# Patient Record
Sex: FEMALE | Race: BLACK OR AFRICAN AMERICAN | NOT HISPANIC OR LATINO | Employment: OTHER | URBAN - METROPOLITAN AREA
[De-identification: names, ages, dates, MRNs, and addresses within clinical notes are randomized per-mention and may not be internally consistent; named-entity substitution may affect disease eponyms.]

---

## 2017-01-04 ENCOUNTER — OFFICE VISIT (OUTPATIENT)
Dept: OPTOMETRY | Facility: CLINIC | Age: 27
End: 2017-01-04
Payer: COMMERCIAL

## 2017-01-04 ENCOUNTER — APPOINTMENT (OUTPATIENT)
Dept: OPTOMETRY | Facility: CLINIC | Age: 27
End: 2017-01-04
Payer: COMMERCIAL

## 2017-01-04 DIAGNOSIS — H52.13 MYOPIA, BILATERAL: Primary | ICD-10-CM

## 2017-01-04 PROCEDURE — 92340 FIT SPECTACLES MONOFOCAL: CPT | Performed by: OPTOMETRIST

## 2017-01-04 PROCEDURE — 92004 COMPRE OPH EXAM NEW PT 1/>: CPT | Performed by: OPTOMETRIST

## 2017-01-04 PROCEDURE — 92015 DETERMINE REFRACTIVE STATE: CPT | Performed by: OPTOMETRIST

## 2017-01-04 ASSESSMENT — REFRACTION_MANIFEST
OD_SPHERE: -0.50
OS_CYLINDER: SPHERE
OS_SPHERE: -0.50
OD_CYLINDER: SPHERE

## 2017-01-04 ASSESSMENT — CUP TO DISC RATIO
OS_RATIO: 0.3
OD_RATIO: 0.2

## 2017-01-04 ASSESSMENT — CONF VISUAL FIELD
OS_NORMAL: 1
OD_NORMAL: 1
METHOD: COUNTING FINGERS

## 2017-01-04 ASSESSMENT — VISUAL ACUITY
METHOD: SNELLEN - LINEAR
OD_SC+: -1
OD_SC: 20/20
OS_SC: 20/20
OS_SC: 20/30
OS_SC+: -1
OD_SC: 20/20-1

## 2017-01-04 ASSESSMENT — SLIT LAMP EXAM - LIDS
COMMENTS: NORMAL
COMMENTS: NORMAL

## 2017-01-04 ASSESSMENT — TONOMETRY
IOP_METHOD: TONOPEN
OS_IOP_MMHG: 10
OD_IOP_MMHG: 14

## 2017-01-04 ASSESSMENT — EXTERNAL EXAM - LEFT EYE: OS_EXAM: NORMAL

## 2017-01-04 ASSESSMENT — EXTERNAL EXAM - RIGHT EYE: OD_EXAM: NORMAL

## 2017-01-04 NOTE — MR AVS SNAPSHOT
After Visit Summary   1/4/2017    Hilaria Bonner    MRN: [id number]           Patient Information     Date Of Birth          1990        Visit Information        Provider Department      1/4/2017 2:30 PM Marquez Pang, BROOK [facility]        Today's Diagnoses     Myopia, bilateral    -  1       Care Instructions    Recommend new glasses.  Glasses for distance vision only.    Return in 1 year for a complete eye exam or sooner if needed.    Marquez Pang, BROOK    The affects of the dilating drops last for 4- 6 hours.  You will be more sensitive to light and vision will be blurry up close.  Mydriatic sunglasses were given if needed.            Follow-ups after your visit        Follow-up notes from your care team     Return in about 1 year (around 1/4/2018) for Annual Visit.      Your next 10 appointments already scheduled     Jan 25, 2017  2:00 PM   Return Visit with Reno Lee DPM   [facility]    [address] [place] [id number] [phone]              Who to contact     If you have questions or need follow up information about today's clinic visit or your schedule please contact [facility] directly at [phone].  Normal or non-critical lab and imaging results will be communicated to you by MyChart, letter or phone within 4 business days after the clinic has received the results. If you do not hear from us within 7 days, please contact the clinic through MyChart or phone. If you have a critical or abnormal lab result, we will notify you by phone as soon as possible.  Submit refill requests through [employer] or call your pharmacy and they will forward the refill request to us. Please allow 3 business days for your refill to be completed.          Additional Information About Your Visit        [employer] Information     Att[employer]io is an electronic gateway that provides easy, online access to your  medical records. With [employer], you can request a clinic appointment, read your test results, renew a prescription or communicate with your care team.     To sign up for [employer] visit the website at [email or web address]   You will be asked to enter the access code listed below, as well as some personal information. Please follow the directions to create your username and password.     Your access code is: [email or web address]  Expires: 3/6/2017 11:25 AM     Your access code will  in 90 days. If you need help or a new code, please contact your [facility] or call [phone] for assistance.        Care EveryWhere ID     This is your Care EveryWhere ID. This could be used by other organizations to access your [place] medical records  [id number]        Your Vitals Were     Last Period                   2016            Blood Pressure from Last 3 Encounters:   16 126/80   16 126/100    Weight from Last 3 Encounters:   16 146.104 kg (322 lb 1.6 oz)   16 146.104 kg (322 lb 1.6 oz)              We Performed the Following     EYE EXAM (SIMPLE-NONBILLABLE)     REFRACTION        Primary Care Provider Office Phone #    [facility] [phone]       No address on file        Thank you!     Thank you for choosing [facility]  for your care. Our goal is always to provide you with excellent care. Hearing back from our patients is one way we can continue to improve our services. Please take a few minutes to complete the written survey that you may receive in the mail after your visit with us. Thank you!             Your Updated Medication List - Protect others around you: Learn how to safely use, store and throw away your medicines at www.disposemymeds.org.          This list is accurate as of: 17  3:19 PM.  Always use your most recent med list.                   Brand Name Dispense Instructions for use    medroxyPROGESTERone 150  MG/ML injection    DEPO-PROVERA    1 mL    Inject 1 mL (150 mg) into the muscle every 3 months       traMADol 50 MG tablet    ULTRAM     Take by mouth every 6 hours as needed for moderate pain

## 2017-01-04 NOTE — PATIENT INSTRUCTIONS
Recommend new glasses.  Glasses for distance vision only.    Return in 1 year for a complete eye exam or sooner if needed.    Marquez Pang, OD    The affects of the dilating drops last for 4- 6 hours.  You will be more sensitive to light and vision will be blurry up close.  Mydriatic sunglasses were given if needed.

## 2017-01-04 NOTE — PROGRESS NOTES
Chief Complaint   Patient presents with     COMPREHENSIVE EYE EXAM      Accompanied by son  Last Eye Exam: 15 years  Dilated Previously: patient unsure, side effects of dilation explained today,info given to patient     What are you currently using to see?  does not use glasses or contacts       Distance Vision Acuity: Noticed gradual change in both eyes    Near Vision Acuity: Satisfied with vision while reading  unaided    Eye Comfort: good  Do you use eye drops? : No  Occupation or Hobbies: [employer]         Medical, surgical and family histories reviewed and updated 1/4/2017.       OBJECTIVE: See Ophthalmology exam    ASSESSMENT:    ICD-10-CM    1. Myopia, bilateral H52.13 EYE EXAM (SIMPLE-NONBILLABLE)     REFRACTION      PLAN:     Patient Instructions   Recommend new glasses.  Glasses for distance vision only.    Return in 1 year for a complete eye exam or sooner if needed.    Marquez Pang, OD

## 2017-03-08 ENCOUNTER — ALLIED HEALTH/NURSE VISIT (OUTPATIENT)
Dept: NURSING | Facility: CLINIC | Age: 27
End: 2017-03-08
Payer: COMMERCIAL

## 2017-03-08 VITALS — DIASTOLIC BLOOD PRESSURE: 86 MMHG | SYSTOLIC BLOOD PRESSURE: 118 MMHG

## 2017-03-08 PROCEDURE — 96372 THER/PROPH/DIAG INJ SC/IM: CPT

## 2017-03-08 PROCEDURE — 99207 ZZC NO CHARGE NURSE ONLY: CPT

## 2017-03-08 NOTE — MR AVS SNAPSHOT
"              After Visit Summary   3/8/2017    Hilaria Bonner    MRN: [id number]           Patient Information     Date Of Birth          1990        Visit Information        Provider Department      3/8/2017 3:00 PM BA [facility]LL[facility]        Today's Diagnoses     Contraception    -  1       Follow-ups after your visit        Who to contact     If you have questions or need follow up information about today's clinic visit or your schedule please contact [facility] directly at [phone].  Normal or non-critical lab and imaging results will be communicated to you by MyChart, letter or phone within 4 business days after the clinic has received the results. If you do not hear from us within 7 days, please contact the clinic through [employer] or phone. If you have a critical or abnormal lab result, we will notify you by phone as soon as possible.  Submit refill requests through Re[employer] or call your pharmacy and they will forward the refill request to us. Please allow 3 business days for your refill to be completed.          Additional Information About Your Visit        MyChart Information     ReSnap lets you send messages to your doctor, view your test results, renew your prescriptions, schedule appointments and more. To sign up, go to [email or web address] . Click on \"Log in\" on the left side of the screen, which will take you to the Welcome page. Then click on \"Sign up Now\" on the right side of the page.     You will be asked to enter the access code listed below, as well as some personal information. Please follow the directions to create your username and password.     Your access code is: [email or web address]  Expires: 2017  3:55 PM     Your access code will  in 90 days. If you need help or a new code, please call your [facility] or [phone].        Care EveryWhere ID     This is your Care EveryWhere ID. This could be used by other organizations to access " your [place] medical records  [id number]         Blood Pressure from Last 3 Encounters:   03/08/17 118/86   12/14/16 126/80   12/06/16 (!) 126/100    Weight from Last 3 Encounters:   12/14/16 (!) 146.1 kg (322 lb 1.6 oz)   12/06/16 (!) 146.1 kg (322 lb 1.6 oz)              We Performed the Following     INJECTION INTRAMUSCULAR OR SUB-Q     Medroxyprogesterone inj  1mg   (Depo Provera J-Code)        Primary Care Provider Office Phone #    [facility] [phone]       No address on file        Thank you!     Thank you for choosing [facility]  for your care. Our goal is always to provide you with excellent care. Hearing back from our patients is one way we can continue to improve our services. Please take a few minutes to complete the written survey that you may receive in the mail after your visit with us. Thank you!             Your Updated Medication List - Protect others around you: Learn how to safely use, store and throw away your medicines at www.disposemymeds.org.          This list is accurate as of: 3/8/17  3:55 PM.  Always use your most recent med list.                   Brand Name Dispense Instructions for use    medroxyPROGESTERone 150 MG/ML injection    DEPO-PROVERA    1 mL    Inject 1 mL (150 mg) into the muscle every 3 months       traMADol 50 MG tablet    ULTRAM     Take by mouth every 6 hours as needed for moderate pain

## 2017-03-08 NOTE — NURSING NOTE
The following medication was given:     MEDICATION: Depo Provera 150mg  ROUTE: IM  SITE: [facility]  DOSE: 150 mg  LOT #: [id number]  :  Krystyna HERNÁNDEZ   EXPIRATION DATE:  02/2019  NDC#: [address]-9  Blood Pressure: 118/86   Date of last Pap:  December of 2016  Radha Greene CMA  March 8, 2017 3:36 PM

## 2017-05-24 ENCOUNTER — ALLIED HEALTH/NURSE VISIT (OUTPATIENT)
Dept: NURSING | Facility: CLINIC | Age: 27
End: 2017-05-24
Payer: COMMERCIAL

## 2017-05-24 PROCEDURE — 96372 THER/PROPH/DIAG INJ SC/IM: CPT

## 2017-05-24 PROCEDURE — 99207 ZZC NO CHARGE NURSE ONLY: CPT

## 2017-05-24 NOTE — NURSING NOTE
The following medication was given:     MEDICATION: Depo Provera 150mg  ROUTE: IM  SITE: [facility]  DOSE: 1 ML  LOT #: [id number]  :  [employer]   EXPIRATION DATE:  12/01/2019  NDC#: [address]-2  Due:  Aug 9- Aug 23 2017    DARRYN Barreto MA

## 2017-05-24 NOTE — MR AVS SNAPSHOT
After Visit Summary   5/24/2017    Hilaria Bonner    MRN: [id number]           Patient Information     Date Of Birth          1990        Visit Information        Provider Department      5/24/2017 11:40 AM BA ANCILLARY [facility]        Today's Diagnoses     Contraception    -  1       Follow-ups after your visit        Who to contact     If you have questions or need follow up information about today's clinic visit or your schedule please contact [facility] directly at [phone].  Normal or non-critical lab and imaging results will be communicated to you by MyChart, letter or phone within 4 business days after the clinic has received the results. If you do not hear from us within 7 days, please contact the clinic through [employer] or phone. If you have a critical or abnormal lab result, we will notify you by phone as soon as possible.  Submit refill requests through [employer] or call your pharmacy and they will forward the refill request to us. Please allow 3 business days for your refill to be completed.          Additional Information About Your Visit        MyChart Information     [employer] gives you secure access to your electronic health record. If you see a primary care provider, you can also send messages to your care team and make appointments. If you have questions, please call your primary care clinic.  If you do not have a primary care provider, please call [phone] and they will assist you.        Care EveryWhere ID     This is your Care EveryWhere ID. This could be used by other organizations to access your [place] medical records  [id number]         Blood Pressure from Last 3 Encounters:   03/08/17 118/86   12/14/16 126/80   12/06/16 (!) 126/100    Weight from Last 3 Encounters:   12/14/16 (!) 146.1 kg (322 lb 1.6 oz)   12/06/16 (!) 146.1 kg (322 lb 1.6 oz)              Today, you had the following     No orders found for display       Primary  Care Provider [facility] Phone [facility] [phone]       No address on file        Thank you!     Thank you for choosing [facility]  for your care. Our goal is always to provide you with excellent care. Hearing back from our patients is one way we can continue to improve our services. Please take a few minutes to complete the written survey that you may receive in the mail after your visit with us. Thank you!             Your Updated Medication List - Protect others around you: Learn how to safely use, store and throw away your medicines at www.disposemymeds.org.          This list is accurate as of: 5/24/17 12:58 PM.  Always use your most recent med list.                   Brand Name Dispense Instructions for use    medroxyPROGESTERone 150 MG/ML injection    DEPO-PROVERA    1 mL    Inject 1 mL (150 mg) into the muscle every 3 months       traMADol 50 MG tablet    ULTRAM     Take by mouth every 6 hours as needed for moderate pain

## 2018-01-04 ENCOUNTER — TRANSFERRED RECORDS (OUTPATIENT)
Dept: HEALTH INFORMATION MANAGEMENT | Facility: CLINIC | Age: 28
End: 2018-01-04

## 2018-01-12 ENCOUNTER — OFFICE VISIT (OUTPATIENT)
Dept: FAMILY MEDICINE | Facility: CLINIC | Age: 28
End: 2018-01-12
Payer: COMMERCIAL

## 2018-01-12 VITALS
WEIGHT: 293 LBS | HEART RATE: 89 BPM | BODY MASS INDEX: 45.99 KG/M2 | OXYGEN SATURATION: 100 % | DIASTOLIC BLOOD PRESSURE: 86 MMHG | TEMPERATURE: 98.2 F | HEIGHT: 67 IN | SYSTOLIC BLOOD PRESSURE: 122 MMHG

## 2018-01-12 DIAGNOSIS — Z00.00 ROUTINE HISTORY AND PHYSICAL EXAMINATION OF ADULT: Primary | ICD-10-CM

## 2018-01-12 DIAGNOSIS — N92.6 IRREGULAR MENSTRUAL CYCLE: ICD-10-CM

## 2018-01-12 DIAGNOSIS — E66.01 MORBID OBESITY (H): ICD-10-CM

## 2018-01-12 LAB
BETA HCG QUAL IFA URINE: NEGATIVE
CHOLEST SERPL-MCNC: 198 MG/DL
GLUCOSE SERPL-MCNC: 130 MG/DL (ref 70–99)
HBA1C MFR BLD: 5.3 % (ref 4.3–6)
HDLC SERPL-MCNC: 71 MG/DL
HGB BLD-MCNC: 14.8 G/DL (ref 11.7–15.7)
LDLC SERPL CALC-MCNC: 90 MG/DL
NONHDLC SERPL-MCNC: 127 MG/DL
TRIGL SERPL-MCNC: 183 MG/DL
TSH SERPL DL<=0.005 MIU/L-ACNC: 2.39 MU/L (ref 0.4–4)

## 2018-01-12 PROCEDURE — 84443 ASSAY THYROID STIM HORMONE: CPT | Performed by: PHYSICIAN ASSISTANT

## 2018-01-12 PROCEDURE — 84703 CHORIONIC GONADOTROPIN ASSAY: CPT | Performed by: PHYSICIAN ASSISTANT

## 2018-01-12 PROCEDURE — 80061 LIPID PANEL: CPT | Performed by: PHYSICIAN ASSISTANT

## 2018-01-12 PROCEDURE — 85018 HEMOGLOBIN: CPT | Performed by: PHYSICIAN ASSISTANT

## 2018-01-12 PROCEDURE — 83036 HEMOGLOBIN GLYCOSYLATED A1C: CPT | Performed by: PHYSICIAN ASSISTANT

## 2018-01-12 PROCEDURE — 82947 ASSAY GLUCOSE BLOOD QUANT: CPT | Performed by: PHYSICIAN ASSISTANT

## 2018-01-12 PROCEDURE — 99395 PREV VISIT EST AGE 18-39: CPT | Performed by: PHYSICIAN ASSISTANT

## 2018-01-12 PROCEDURE — 36415 COLL VENOUS BLD VENIPUNCTURE: CPT | Performed by: PHYSICIAN ASSISTANT

## 2018-01-12 RX ORDER — NORETHINDRONE ACETATE AND ETHINYL ESTRADIOL .02; 1 MG/1; MG/1
1 TABLET ORAL DAILY
Qty: 63 TABLET | Refills: 4 | Status: SHIPPED | OUTPATIENT
Start: 2018-01-12 | End: 2019-03-07

## 2018-01-12 NOTE — Clinical Note
Please abstract the following data from this visit with this patient into the appropriate field in Epic: Eye exam with ophthalmology on this date: 1/4/18

## 2018-01-12 NOTE — PROGRESS NOTES
SUBJECTIVE:   CC: Hilaria Bonner is an 27 year old woman who presents for preventive health visit.     Healthy Habits:    Do you get at least three servings of calcium containing foods daily (dairy, green leafy vegetables, etc.)? yes    Amount of exercise or daily activities, outside of work: 7 day(s) per week- 30 minutes walking    Problems taking medications regularly No    Medication side effects: No    Have you had an eye exam in the past two years? yes    Do you see a dentist twice per year? yes    Do you have sleep apnea, excessive snoring or daytime drowsiness?no  Eye exam with ophthalmology on this date: 1/4/18    Concern:   1. Irregular periods. Patient has stopped Depo 1 year ago, but her periods have not stabilized yet. She may get period 1-2 times a month and its heavy.     2. Patient is gaining weight. Patient would like professional help to manage her weight.     Today's PHQ-2 Score:   PHQ-2 ( 1999 Pfizer) 1/12/2018 12/6/2016   Q1: Little interest or pleasure in doing things 0 0   Q2: Feeling down, depressed or hopeless 0 0   PHQ-2 Score 0 0     Abuse: Current or Past(Physical, Sexual or Emotional)- No  Do you feel safe in your environment - Yes  Social History   Substance Use Topics     Smoking status: Former Smoker     Start date: 11/20/2016     Smokeless tobacco: Never Used     Alcohol use 0.0 oz/week     0 Standard drinks or equivalent per week      Comment: occasional     If you drink alcohol do you typically have >3 drinks per day or >7 drinks per week? No                     Reviewed orders with patient.  Reviewed health maintenance and updated orders accordingly - Yes  Patient Active Problem List   Diagnosis     Morbid obesity (H)     Past Surgical History:   Procedure Laterality Date     KNEE SURGERY  most recently - [id number]    3 surgeries in [place]       Social History   Substance Use Topics     Smoking status: Former Smoker     Start date: 11/20/2016     Smokeless tobacco: Never Used      "Alcohol use 0.0 oz/week     0 Standard drinks or equivalent per week      Comment: occasional     Family History   Problem Relation Age of Onset     DIABETES Father      Hypertension Father      Breast Cancer Sister 26     surgery only     CANCER Sister      Coronary Artery Disease Other      paternal aunt     Thyroid Disease Other      neice     CEREBROVASCULAR DISEASE No family hx of          Current Outpatient Prescriptions   Medication Sig Dispense Refill     Multiple Vitamins-Minerals (WOMENS MULTI VITAMIN & MINERAL PO)        norethindrone-ethinyl estradiol (MICROGESTIN 1/20) 1-20 MG-MCG per tablet Take 1 tablet by mouth daily 63 tablet 4     No Known Allergies      Mammogram not appropriate for this patient based on age.    Pertinent mammograms are reviewed under the imaging tab.  History of abnormal Pap smear:   NO - age 30- 65 PAP every 3 years recommended  Last 3 Pap Results:   PAP (no units)   Date Value   12/06/2016 NIL          ROS:  CONSTITUTIONAL:weight gain  I: NEGATIVE for worrisome rashes, moles or lesions  E: NEGATIVE for vision changes or irritation  ENT: NEGATIVE for ear, mouth and throat problems  R: NEGATIVE for significant cough or SOB  B: NEGATIVE for masses, tenderness or discharge  CV: NEGATIVE for chest pain, palpitations or peripheral edema  GI: NEGATIVE for nausea, abdominal pain, heartburn, or change in bowel habits   female: no unusual urinary symptoms, no unusual vaginal symptoms and menses: irregular-frequent  and menorrhagia   M: NEGATIVE for significant arthralgias or myalgia  N: NEGATIVE for weakness, dizziness or paresthesias  P: NEGATIVE for changes in mood or affect    OBJECTIVE:   /86 (BP Location: Right arm, Patient Position: Chair, Cuff Size: Adult Large)  Pulse 89  Temp 98.2  F (36.8  C) (Oral)  Ht 5' 6.61\" (1.692 m)  Wt (!) 317 lb 4.8 oz (143.9 kg)  LMP 12/22/2017 (Exact Date)  SpO2 100%  BMI 50.27 kg/m2  EXAM:  GENERAL: healthy, alert and no " distress  EYES: Eyes grossly normal to inspection, PERRL and conjunctivae and sclerae normal  HENT: ear canals and TM's normal, nose and mouth without ulcers or lesions  NECK: no adenopathy, no asymmetry, masses, or scars and thyroid normal to palpation  RESP: lungs clear to auscultation - no rales, rhonchi or wheezes  BREAST: normal without masses, tenderness or nipple discharge and no palpable axillary masses or adenopathy  CV: regular rate and rhythm, normal S1 S2, no S3 or S4, no murmur, click or rub, no peripheral edema and peripheral pulses strong  ABDOMEN: soft, nontender, no hepatosplenomegaly, no masses and bowel sounds normal  MS: no gross musculoskeletal defects noted, no edema  SKIN: no suspicious lesions or rashes  NEURO: Normal strength and tone, mentation intact and speech normal  PSYCH: mentation appears normal, affect normal/bright    ASSESSMENT/PLAN:       ICD-10-CM    1. Routine history and physical examination of adult Z00.00    2. Irregular menstrual cycle N92.6 Beta HCG qual IFA urine - FMG and Neptune     Hemoglobin     norethindrone-ethinyl estradiol (MICROGESTIN 1/20) 1-20 MG-MCG per tablet   3. Morbid obesity (H) E66.01 BARIATRIC ADULT REFERRAL     Lipid Profile (Chol, Trig, HDL, LDL calc)     Hemoglobin A1c     Glucose     TSH with free T4 reflex   patient will start Loestrin 1 tab daily to help regulate period and flow.   Discussed risks associated with starting oral contraception , obesity and smoking.   Patient was strongly advised to stop smoking  Patient was advised to move around more especially on long trips to prevent blood clots.  Signs and symptom of DVT and PE were discussed.  Patient was also placed on low dose combination pill.   Patient reported understanding of risks and will stop occasional tobacco use.   3. Patient will make appointment with bariatric clinic and start working on weight loss.     COUNSELING:   Reviewed preventive health counseling, as reflected in  "patient instructions       Regular exercise       Healthy diet/nutrition       Contraception       Safe sex practices/STD prevention         reports that she has quit smoking. She started smoking about 13 months ago. She has never used smokeless tobacco. patient smokes 1-2 cigs on occasion. Patient was told to stop smoking.  Tobacco Cessation Action Plan: Self help information given to patient  Estimated body mass index is 50.27 kg/(m^2) as calculated from the following:    Height as of this encounter: 5' 6.61\" (1.692 m).    Weight as of this encounter: 317 lb 4.8 oz (143.9 kg).   Weight management plan: Patient referred to endocrine and/or weight management specialty    Counseling Resources:  ATP IV Guidelines  Pooled Cohorts Equation Calculator  Breast Cancer Risk Calculator  FRAX Risk Assessment  ICSI Preventive Guidelines  Dietary Guidelines for Americans, 2010  USDA's MyPlate  ASA Prophylaxis  Lung CA Screening    Terri Cody PA-C  [facility]  "

## 2018-01-12 NOTE — MR AVS SNAPSHOT
After Visit Summary   1/12/2018    Hilaria Bonner    MRN: [id number]           Patient Information     Date Of Birth          1990        Visit Information        Provider Department      1/12/2018 9:00 AM Terri Cody PA-C [facility]        Today's Diagnoses     Irregular menstrual cycle    -  1    Need for prophylactic vaccination and inoculation against influenza        Morbid obesity (H)          Care Instructions      Preventive Health Recommendations  Female Ages 26 - 39  Yearly exam:   See your health care provider every year in order to    Review health changes.     Discuss preventive care.      Review your medicines if you your doctor has prescribed any.    Until age 30: Get a Pap test every three years (more often if you have had an abnormal result).    After age 30: Talk to your doctor about whether you should have a Pap test every 3 years or have a Pap test with HPV screening every 5 years.   You do not need a Pap test if your uterus was removed (hysterectomy) and you have not had cancer.  You should be tested each year for STDs (sexually transmitted diseases), if you're at risk.   Talk to your provider about how often to have your cholesterol checked.  If you are at risk for diabetes, you should have a diabetes test (fasting glucose).  Shots: Get a flu shot each year. Get a tetanus shot every 10 years.   Nutrition:     Eat at least 5 servings of fruits and vegetables each day.    Eat whole-grain bread, whole-wheat pasta and brown rice instead of white grains and rice.    Talk to your provider about Calcium and Vitamin D.     Lifestyle    Exercise at least 150 minutes a week (30 minutes a day, 5 days of the week). This will help you control your weight and prevent disease.    Limit alcohol to one drink per day.    No smoking.     Wear sunscreen to prevent skin cancer.    See your dentist every six months for an exam and cleaning.             Follow-ups after your visit        Additional Services     BARIATRIC ADULT REFERRAL       [facility] [phone]. [email or web address]    Please be aware that coverage of these services is subject to the terms and limitations of your health insurance plan.  Call member services at your health plan with any benefit or coverage questions.      Please bring the following with you to your appointment:      (1) List of current medications   (2) This referral request   (3) Any documents/labs given to you for this referral                  Who to contact     If you have questions or need follow up information about today's clinic visit or your schedule please contact [facility] directly at [phone].  Normal or non-critical lab and imaging results will be communicated to you by MyChart, letter or phone within 4 business days after the clinic has received the results. If you do not hear from us within 7 days, please contact the clinic through [employer] or phone. If you have a critical or abnormal lab result, we will notify you by phone as soon as possible.  Submit refill requests through [employer] or call your pharmacy and they will forward the refill request to us. Please allow 3 business days for your refill to be completed.          Additional Information About Your Visit        MyChart Information     [employer] gives you secure access to your electronic health record. If you see a primary care provider, you can also send messages to your care team and make appointments. If you have questions, please call your primary care clinic.  If you do not have a primary care provider, please call [phone] and they will assist you.        Care EveryWhere ID     This is your Care EveryWhere ID. This could be used by other organizations to access your [facility]  "records  [id number]        Your Vitals Were     Pulse Temperature Height Last Period Pulse Oximetry BMI (Body Mass Index)    89 98.2  F (36.8  C) (Oral) 5' 6.61\" (1.692 m) 12/22/2017 (Exact Date) 100% 50.27 kg/m2       Blood Pressure from Last 3 Encounters:   01/12/18 122/86   03/08/17 118/86   12/14/16 126/80    Weight from Last 3 Encounters:   01/12/18 (!) 317 lb 4.8 oz (143.9 kg)   12/14/16 (!) 322 lb 1.6 oz (146.1 kg)   12/06/16 (!) 322 lb 1.6 oz (146.1 kg)              We Performed the Following     BARIATRIC ADULT REFERRAL     Beta HCG qual IFA urine - [facility]     Glucose     Hemoglobin A1c     Hemoglobin     Lipid Profile (Chol, Trig, HDL, LDL calc)     TSH with free T4 reflex          Today's Medication Changes          These changes are accurate as of: 1/12/18 10:04 AM.  If you have any questions, ask your nurse or doctor.               Start taking these medicines.        Dose/Directions    norethindrone-ethinyl estradiol 1-20 MG-MCG per tablet   Commonly known as:  MICROGESTIN 1/20   Used for:  Irregular menstrual cycle   Started by:  Terri Cody PA-C        Dose:  1 tablet   Take 1 tablet by mouth daily   Quantity:  63 tablet   Refills:  4            Where to get your medicines      These medications were sent to [facility] [address] [place] [address] [facility] [address] [facility] [place] [address] [facility] [address]     Phone:  [phone]     norethindrone-ethinyl estradiol 1-20 MG-MCG per tablet                Primary Care Provider Office Phone # Fax #    [facility] [phone] [address] [facility] [address]        Equal Access to Services     JEREMÍAS LIGHT AH: Danielito tadeo Soángela, waaxda luqadaha, qaybta kaalneida watson, laura aparicio. [facility] [phone].    ATENCIÓN: Si habla español, tiene a elizabeth disposición servicios gratuitos de " asistencia lingüística. Debbi al [phone].    We comply with applicable federal civil rights laws and [place] laws. We do not discriminate on the basis of race, color, national origin, age, disability, sex, sexual orientation, or gender identity.            Thank you!     Thank you for choosing [facility]  for your care. Our goal is always to provide you with excellent care. Hearing back from our patients is one way we can continue to improve our services. Please take a few minutes to complete the written survey that you may receive in the mail after your visit with us. Thank you!             Your Updated Medication List - Protect others around you: Learn how to safely use, store and throw away your medicines at www.disposemymeds.org.          This list is accurate as of: 1/12/18 10:04 AM.  Always use your most recent med list.                   Brand Name Dispense Instructions for use Diagnosis    norethindrone-ethinyl estradiol 1-20 MG-MCG per tablet    MICROGESTIN 1/20    63 tablet    Take 1 tablet by mouth daily    Irregular menstrual cycle       WOMENS MULTI VITAMIN & MINERAL PO

## 2018-01-16 NOTE — TELEPHONE ENCOUNTER
APPT INFO    Date /Time: 1/24/18 at 10AM   Reason for Appt: NBS   Ref Provider/Clinic: Juanpablo Cody   Are there internal records? Yes/No?  IF YES, list clinic names: [facility]   Are there outside records? Yes/No? No   Patient Contact (Y/N) & Call Details: No   Action: Chart reviewed

## 2018-01-24 ENCOUNTER — OFFICE VISIT (OUTPATIENT)
Dept: SURGERY | Facility: CLINIC | Age: 28
End: 2018-01-24
Payer: COMMERCIAL

## 2018-01-24 ENCOUNTER — PRE VISIT (OUTPATIENT)
Dept: SURGERY | Facility: CLINIC | Age: 28
End: 2018-01-24

## 2018-01-24 ENCOUNTER — ALLIED HEALTH/NURSE VISIT (OUTPATIENT)
Dept: SURGERY | Facility: CLINIC | Age: 28
End: 2018-01-24
Payer: COMMERCIAL

## 2018-01-24 VITALS
HEART RATE: 89 BPM | HEIGHT: 66 IN | SYSTOLIC BLOOD PRESSURE: 138 MMHG | WEIGHT: 293 LBS | TEMPERATURE: 98.4 F | BODY MASS INDEX: 47.09 KG/M2 | OXYGEN SATURATION: 97 % | DIASTOLIC BLOOD PRESSURE: 95 MMHG

## 2018-01-24 DIAGNOSIS — Z72.0 TOBACCO ABUSE: ICD-10-CM

## 2018-01-24 DIAGNOSIS — E66.01 MORBID OBESITY (H): Primary | ICD-10-CM

## 2018-01-24 LAB
ALBUMIN SERPL-MCNC: 3.3 G/DL (ref 3.4–5)
ALP SERPL-CCNC: 125 U/L (ref 40–150)
ALT SERPL W P-5'-P-CCNC: 18 U/L (ref 0–50)
ANION GAP SERPL CALCULATED.3IONS-SCNC: 8 MMOL/L (ref 3–14)
AST SERPL W P-5'-P-CCNC: 21 U/L (ref 0–45)
BILIRUB SERPL-MCNC: 0.5 MG/DL (ref 0.2–1.3)
BUN SERPL-MCNC: 10 MG/DL (ref 7–30)
CALCIUM SERPL-MCNC: 8.4 MG/DL (ref 8.5–10.1)
CHLORIDE SERPL-SCNC: 108 MMOL/L (ref 94–109)
CO2 SERPL-SCNC: 23 MMOL/L (ref 20–32)
CREAT SERPL-MCNC: 0.7 MG/DL (ref 0.52–1.04)
DEPRECATED CALCIDIOL+CALCIFEROL SERPL-MC: 5 UG/L (ref 20–75)
ERYTHROCYTE [DISTWIDTH] IN BLOOD BY AUTOMATED COUNT: 13.9 % (ref 10–15)
GFR SERPL CREATININE-BSD FRML MDRD: >90 ML/MIN/1.7M2
GLUCOSE SERPL-MCNC: 85 MG/DL (ref 70–99)
HCT VFR BLD AUTO: 45.3 % (ref 35–47)
HGB BLD-MCNC: 15.3 G/DL (ref 11.7–15.7)
MCH RBC QN AUTO: 33.6 PG (ref 26.5–33)
MCHC RBC AUTO-ENTMCNC: 33.8 G/DL (ref 31.5–36.5)
MCV RBC AUTO: 100 FL (ref 78–100)
PLATELET # BLD AUTO: 269 10E9/L (ref 150–450)
POTASSIUM SERPL-SCNC: 3.9 MMOL/L (ref 3.4–5.3)
PROT SERPL-MCNC: 7.6 G/DL (ref 6.8–8.8)
PTH-INTACT SERPL-MCNC: 188 PG/ML (ref 12–72)
RBC # BLD AUTO: 4.55 10E12/L (ref 3.8–5.2)
SODIUM SERPL-SCNC: 139 MMOL/L (ref 133–144)
WBC # BLD AUTO: 5.5 10E9/L (ref 4–11)

## 2018-01-24 RX ORDER — TOPIRAMATE 25 MG/1
TABLET, FILM COATED ORAL
Qty: 90 TABLET | Refills: 1 | Status: SHIPPED | OUTPATIENT
Start: 2018-01-24 | End: 2018-03-28

## 2018-01-24 NOTE — LETTER
"2018       RE: Hilaria Bonner  [address]   [facility] [address]     Dear Colleague,    Thank you for referring your patient, Hilaria Bonner, to the [facility] SURGICAL WEIGHT MANAGEMENT at [facility]. Please see a copy of my visit note below.        New Bariatric Surgery Consultation Note    RE: Hilaria Bonner  MR#: [id number]  : 1990      Referring provider:       2018   Who referred you? neo chino       Chief Complaint/Reason for visit: evaluation for possible weight loss surgery    Dear Clinic, [facility] ([facility]),    I had the pleasure of seeing your patient, Hilaria Bonner, to evaluate her obesity and consider her for possible weight loss surgery. As you know, Hilaria Bonner is 27 year old.  She has a height of 5' 5.748\", a weight of 315 lbs 6.4 oz, and calculated Body mass index is 51.3 kg/(m^2).    HISTORY OF PRESENT ILLNESS:  Weight Loss History Reviewed with Patient 2018   How long have you been overweight? Since early childhood   What is the most that you have ever weighed? 325   What is the most weight you have lost? 3   I have tried the following methods to lose weight Watching portions or calories, Exercise, Atkins type diet (low carb/high protein), Pre packaged meals ex: Nutrisystem, Slimfast, OTC Medications   I have tried the following weight loss medications? (Check all that apply) None   Have you ever had weight loss surgery? No       CO-MORBIDITIES OF OBESITY INCLUDE:     2018   I have the following co-morbidities associated with obesity: High Cholesterol, Weight Bearing Joint Pain       PAST MEDICAL HISTORY:  Past Medical History:   Diagnosis Date     Earache or other ear, nose, or throat complaint 12/15    tyroid       PAST SURGICAL HISTORY:  Past Surgical History:   Procedure Laterality Date     KNEE SURGERY  most recently - [id number]    3 surgeries in [place]       FAMILY HISTORY:   Family " History   Problem Relation Age of Onset     DIABETES Father      Hypertension Father      Breast Cancer Sister 26     surgery only     CANCER Sister      Coronary Artery Disease Other      paternal aunt     Thyroid Disease Other      neice     Coronary Artery Disease Other      CEREBROVASCULAR DISEASE Other      Breast Cancer Sister      Thyroid Disease Other      CEREBROVASCULAR DISEASE No family hx of        SOCIAL HISTORY:   Social History Questions Reviewed With Patient 1/16/2018   Which best describes your employment status (select all that apply) I am unemployed   Which best describes your marital status: single   Do you have children? Yes   Who do you have in your support network that can be available to help you for the first 2 weeks after surgery? parents aunts   Who can you count on for support throughout your weight loss surgery journey? family kids   Can you afford 3 meals a day?  Yes   Can you afford 50-60 dollars a month for vitamins? No   Just moved from [place] in Dec 2017.  Lives with her parents. Job searching now.  Hx of working as CNA, or  at [employer]  2 children age 8 and 18 months.    HABITS:     1/16/2018   How often do you drink alcohol? 2-4 times a month   If you do drink alcohol, how many drinks might you have in a day? (one drink = 5 oz. wine, 1 can/bottle of beer, 1 shot liquor) 1 or 2   Do you currently use any of the following Nicotine products? cigarettes   Have you ever used any of the following nicotine products? Cigarettes   If you previously used any of these products, what year did you quit? 2018   Have you or are you currently using street drugs or prescription strength medication for which you do not have a prescription for? No   Do you have a history of chemical dependency (alcohol or drug abuse)? No       PSYCHOLOGICAL HISTORY:   Psychological History Reviewed With Patient 1/16/2018   Have you ever attempted suicide? Never.   Have you had thoughts of suicide in the past  "year? No   Have you ever been hospitalized for mental illness or a suicide attempt? Never.   Do you have a history of chronic pain? No   Have you ever been diagnosed with fibromyalgia? No   Are you currently being treated for any of the following? (select all that apply) I do not have a mental illness   Hx of treatment for PPD in 2011.     ROS:     1/16/2018   Skin:  Skin fold rashes (groin or other folds), Leg swelling   HEENT: Headaches, Dizziness/lightheadedness, Teeth, dentures, or bridges needing repairs   Musculoskeletal: Back pain, Swelling of legs   Cardiovascular: Chest pain, Shortness of breath with activity   Pulmonary: Shortness of breath with activity, Experience morning headaches   Gastrointestinal: Heartburn, Reflux, Diarrhea   Genitourinary: None of the above   Hematological: Family history of blood clots   Neurological: Migraine headaches   Female only: Excessive menstrual bleeding, Birth control       EATING BEHAVIORS:     1/16/2018   Have you or anyone else thought that you had an eating disorder? Yes   If you answered yes to the previous eating disorder question, select the types that apply from this list: Binge Eating   If you answered \"Other\" to the type of eating disorder question above, please describe what it is: taken too longto eat   Do you currently binge eat (eat a large amount of food in a short time)? No   Are you an emotional eater? No   Do you get up to eat after falling asleep? No       EXERCISE:     1/16/2018   How often do you exercise? 1 to 2 times per week   What is the duration of your exercise (in minutes)? 30 Minutes   What types of exercise do you do? walking   What keeps you from being more active?  I am as active as I can possbily be, Pain, Shortness of breath, Too tired       MEDICATIONS:  Current Outpatient Prescriptions   Medication     Multiple Vitamins-Minerals (WOMENS MULTI VITAMIN & MINERAL PO)     norethindrone-ethinyl estradiol (MICROGESTIN 1/20) 1-20 MG-MCG per " "tablet     No current facility-administered medications for this visit.        ALLERGIES:  No Known Allergies    LABS/IMAGING/MEDICAL RECORDS REVIEW:     PHYSICAL EXAM:  BP (!) 138/95  Pulse 89  Temp 98.4  F (36.9  C) (Oral)  Ht 5' 5.75\"  Wt (!) 315 lb 6.4 oz  LMP 12/22/2017 (Exact Date)  SpO2 97%  BMI 51.3 kg/m2  General: NAD  Neurologic: A & O x 3, gait normal  Head: normocephalic, atraumatic  HEENT: PERRL, EOMI.   Respiratory: respirations unlabored  Abdomen: Obese, Soft NT ND   Extremities: No LE swelling   Skin: warm and dry.  No rashes on exposed skin  Psychiatric: Mentation and Affect appear normal    In summary, Hilaria Bonner has Class III obesity with a body mass index of Body mass index is 51.3 kg/(m^2). kg/m2 and the comorbidities stated above. She completed an informational seminar and is a candidate for the laparoscopic gastric sleeve.  She will have to complete the following pre-requisites:  See RD in 1 month and monthly for at least 6 months  See Joanne in 2 months for PBS f/u on MWM meds  Labs today first floor  Start topiramate ramp to 75mg    Bariatric Task List  Status:  Is patient a candidate for bariatric surgery?:  Yes -     Status:  surgery evaluation in process -     Surgeon: Dr Lopez  -     Tentative surgery month/year: July 2018 -        Insurance: Insurance:  [employer] -        Patient Info: Initial Weight:  315 lb 6.4 oz -     Date of Initial Weight/Height:  11/24/2018 -     Goal Weight (lbs):  295 -     Required Weight Loss:  20 -     Surgery Type:  sleeve gastrectomy -        Dietician Visits: Structured weight loss required by insurance?:  Yes -     Dietician Visit 1:  Completed -     Dietician Visit 2:  Needed -     Dietician Visit 3:  Needed -     Dietician Visit 4:  Needed -     Dietician Visit 5:  Needed -     Dietician Visit 6:  Needed -     Dietician Notes:    -        Psychological Evaluation: Psych eval:  Needed -        Lab Work: Complete Blood Count:  Needed -   " "  Comprehensive Metabolic Panel:  Needed -     Vitamin D:  Needed -     Hgb A1c:  Completed -     PTH:  Needed -     Nicotine Testing:    -  Needed   Other:    -        Consults/ Clearance: Medical Weight Management: Needed - start topiramate 1/24/18      PCP: Establish care with PCP:  Completed -     PCP letter of support:  Needed -        Smoking: Quit tobacco use (3 months smoke free)?:  Needed -     Quit date:  1/17/2018 -        Patient Education:  Information Session:  Completed -     Given \"Making your decision\" handout?:  Yes -     Given support group information?:  Yes -     Support plan in place?:  Completed -     Research consents signed?:  Yes -        Final Tasks:  Before surgery online class:  Needed -     Before surgery online class website link:  [email or web address]   After surgery online class:  Needed -     After surgery online class website link:  [email or web address]   Nurse visit for weigh-in and information:  Needed -     Pre-assessment clinic visit with anesthesia team for H&P:  Needed -     Final labs (Hgb, plt, T&S, UA):  Needed -        Notes:   -       Today in the office we discussed gastric sleeve surgery. Preoperative, perioperative, and postoperative processes, management, and follow up were addressed.  Risks and benefits were outlined including the risk of death, staple line leak (1-2%), PE, DVT, ulcer, worsening GERD, N/V, stricture, hernia, wound infection, weight regain, and vitamin deficiencies. I emphasized exercise and activity along with appropriate food choice as the main foundation for weight loss with surgery providing surgical reinforcement of this.  All questions were answered.  A goal sheet and support group handout were given to the patient.    Once the patient has completed the requirements in their task list and there are no further recommendations, the pt will be allowed to see the surgeon of her choice for consultation on the " laparoscopic gastric sleeve surgery. Patient verbalizes understanding of the process to surgery and expectations for the postoperative period including the need for lifelong lifestyle changes, vitamin supplementation, and laboratory monitoring.         I spent a total of 30 minutes face to face with Hilaria during today's office visit. Over 50% of this time was spent counseling the patient and/or coordinating care.      Again, thank you for allowing me to participate in the care of your patient.      Sincerely,    Joanne Sterling PA-C

## 2018-01-24 NOTE — PATIENT INSTRUCTIONS
See HILARY in 1 month and monthly for at least 6 months  See Joanne in 2 months for PBS f/u on MWM meds  Labs today first floor  Start topiramate ramp to 75mg    MEDICATION STARTED AT THIS APPOINTMENT  We are starting topiramate at bedtime.  Start one tab, 25 mg, for a week. Go up to 50 mg (2 tabs) for the next week. At the third week, take   3 tabs (75 mg).  Stay at 3 tabs until you are seen again. Call the nurse at [phone] if you have any questions or concerns. (Do not stop taking it if you don't think it's working. For some people it works even though they do not feel much different.)    Topiramate (Topamax) is a medication that is used most often to treat migraine headaches or for seizures. It has also been found to help with weight loss. Although it's not currently FDA approved for weight loss, it has been used safely for a number of years to help people who are carrying extra weight.     Just how topiramate helps with weight loss has not been exactly determined. However it seems to work on areas of the brain to quiet down signals related to eating.      Topiramate may make you:    >feel less interest in eating in between meals   >think less about food and eating   >find it easier to push the plate away   >find giving up pop easier    >have an easier time eating less    For some of our patients, the pills work right away. They feel and think quite differently about food. Other patients don't feel much of a change but find in fact they have lost weight! Like all weight loss medications, topiramate works best when you help it work.  This means:    1) Have less tempting high calorie (fattening) food around the house or office    2) Have lower calorie food (fruits, vegetables,low fat meats and dairy) for snacks    3) Eat out only one time or less each week.   4) Eat your meals at a table with the TV or computer off.    Side-effects. Topiramate is generally well tolerated. The main side-effects we see  are:   Tingling in hands,feet, or face (usually not very troublesome)   Mental confusion and word finding trouble (about 10% of patients have this.)     Feeling sleepy or a bit dopey- this goes away very soon after starting.    One of the dangers of topiramate is the possibility of birth defects--if you get pregnant when you are on it, there is the risk that your baby will be born with a cleft lip or palate.  If you are on topiramate and of child bearing age, you need to be on a reliable form of birth control or refrain from sexual intercourse.     Please refer to the pharmacy insert for more information on side-effects. Since many pharmacists are not familiar with the use of topiramate in weight loss, calling the clinic will get you the most accurate information on the use of this medication for weight loss.     In order to get refills of this or any medication we prescribe you must be seen in the medical weight mgmt clinic every 2-3 months. Please have your pharmacy fax a refill request to [phone].    Bariatric Task List  Status:  Is patient a candidate for bariatric surgery?:  Yes -     Status:  surgery evaluation in process -     Surgeon: Dr Lopez  -     Tentative surgery month/year: July 2018 -        Insurance: Insurance:  [employer] -        Patient Info: Initial Weight:  315 lb 6.4 oz -     Date of Initial Weight/Height:  11/24/2018 -     Goal Weight (lbs):  295 -     Required Weight Loss:  20 -     Surgery Type:  sleeve gastrectomy -        Dietician Visits: Structured weight loss required by insurance?:  Yes -     Dietician Visit 1:  Completed -     Dietician Visit 2:  Needed -     Dietician Visit 3:  Needed -     Dietician Visit 4:  Needed -     Dietician Visit 5:  Needed -     Dietician Visit 6:  Needed -     Dietician Notes:    -        Psychological Evaluation: Psych eval:  Needed -        Lab Work: Complete Blood Count:  Needed -     Comprehensive Metabolic Panel:  Needed -     Vitamin D:  Needed -    "  Hgb A1c:  Completed -     PTH:  Needed -     Nicotine Testing:    -  Needed   Other:    -        Consults/ Clearance: Medical Weight Management: Needed - start topiramate 1/24/18      PCP: Establish care with PCP:  Completed -     PCP letter of support:  Needed -        Smoking: Quit tobacco use (3 months smoke free)?:  Needed -     Quit date:  1/17/2018 -        Patient Education:  Information Session:  Completed -     Given \"Making your decision\" handout?:  Yes -     Given support group information?:  Yes -     Support plan in place?:  Completed -     Research consents signed?:  Yes -        Final Tasks:  Before surgery online class:  Needed -     Before surgery online class website link:  [email or web address]   After surgery online class:  Needed -     After surgery online class website link:  [email or web address]   Nurse visit for weigh-in and information:  Needed -     Pre-assessment clinic visit with anesthesia team for H&P:  Needed -     Final labs (Hgb, plt, T&S, UA):  Needed -        Notes:   -             "

## 2018-01-24 NOTE — MR AVS SNAPSHOT
"                  MRN:[id number]                      After Visit Summary   1/24/2018    Hilaria Bonner    MRN: [id number]           Visit Information        Provider Department      1/24/2018 10:30 AM Susan Stephens RD [facility]        Care Instructions    GOALS:  - Eat 3 meals/day   - Avoid snacking in between. If hungry have non-starchy vegetables or fruit in between.   - Eat slowly (20-30 minutes per meal), chewing foods well (25 chews per bite)  - Eliminate calorie-containing beverages (e.g. Crystal light, slices of fruit water)   - 9\" Plate method (1/2 non-starchy vegetables/fruit, 1/4 lean protein, 1/4 whole grain starch - no more than 1 cup carb/meal)    If you need to make or cancel an appointment, please call [phone].           [employer] Information     [employer] gives you secure access to your electronic health record. If you see a primary care provider, you can also send messages to your care team and make appointments. If you have questions, please call your primary care clinic.  If you do not have a primary care provider, please call [phone] and they will assist you.      [employer] is an electronic gateway that provides easy, online access to your medical records. With [employer], you can request a clinic appointment, read your test results, renew a prescription or communicate with your care team.     To access your existing account, please contact your [facility] or call [phone] for assistance.        Care EveryWhere ID     This is your Care EveryWhere ID. This could be used by other organizations to access your [place] medical records  [id number]        Equal Access to Services     [facility] : Hadii dario Katz, wadominikda luqadaha, qaybta laura carr. So [facility] [phone].    ATENCIÓN: Si habla español, tiene a elizabeth disposición servicios gratuitos de asistencia " lingüística. Debbi al [phone].    We comply with applicable federal civil rights laws and [place] laws. We do not discriminate on the basis of race, color, national origin, age, disability, sex, sexual orientation, or gender identity.

## 2018-01-24 NOTE — PROGRESS NOTES
"Nutrition Assessment  Reason For Visit: Nutrition Assessment for NBS    Hilaria Bonner is a 27 year old presenting today for new bariatric nutrition consult. Pt is interested in laparoscopic sleeve gastrectomy with Dr. Lopez expected surgery in July 2018. This is pt's first of 6 required nutrition visits prior to surgery. Pt referred by JEAN Sterling PA-C.     Anthropometrics:  Height: 5' 5.75\"  Admission Weight: 315.4 lbs   BMI: 51.3 kg/m^2    Required weight loss goal pre-op: -20.4 lbs from initial weight; goal weight of 295 lbs.     Current Medications/Supplements: *Starting toparimate today (1/24/18) with GUY Liu.     Nutrition History:  Cravings: salty snacks/chips      Recall Diet:   B: skips   L: skips   D: salad with eggs/cheese/euceda bits/ranch & Persian -or- ramen noodles   Snks: chips (1 bag/day)   Kniza: 30-60 oz juice per day, 20-40 oz sweetened green tea, recently stopped drinking soda.       Dining Out History:  Frequency: Once per week    Location: fast food vs sit-down restaurant   Types of Foods: burgers/fries, steak/chicken      EXERCISE:     1/16/2018   How often do you exercise? 1 to 2 times per week   What is the duration of your exercise (in minutes)? 30 Minutes   What types of exercise do you do? walking   What keeps you from being more active?  I am as active as I can possbily be, Pain, Shortness of breath, Too tired      HABITS:     1/16/2018   How often do you drink alcohol? 2-4 times a month   If you do drink alcohol, how many drinks might you have in a day? (one drink = 5 oz. wine, 1 can/bottle of beer, 1 shot liquor) 1 or 2   Do you currently use any of the following Nicotine products? cigarettes   Have you ever used any of the following nicotine products? Cigarettes   If you previously used any of these products, what year did you quit? 2018   Have you or are you currently using street drugs or prescription strength medication for which you do not have a prescription for? No   Do you " "have a history of chemical dependency (alcohol or drug abuse)? No     Nutrition Diagnosis  Obesity r/t long history of self-monitoring deficit and excessive energy intake aeb BMI >30.    Intervention  Intervention Provided/Education Provided on post-op diet guidelines, vitamins/minerals essential post-operatively, GI anatomy of bariatric surgeries, ways to help prepare for post-op diet guidelines pre-operatively, portion/calorie-control, emphasizing protein intake/sources, my plate method of eating. Discussed significance of eating 3 meals/day and avoiding snacking in relation to mindfulness/weight loss. Provided pt with list of goals and RD contact information.      Patient Understanding: Good   Expected Compliance: Good     GOALS:  - Eat 3 meals/day   - Avoid snacking in between meals. If hungry, have non-starchy vegetables or fruit in between meals.   - Eat slowly (20-30 minutes per meal), chewing foods well (25 chews per bite)  - Eliminate calorie-containing beverages (e.g. Crystal light, slices of fruit water)   - 9\" Plate method (1/2 non-starchy vegetables/fruit, 1/4 lean protein, 1/4 whole grain starch - no more than 1 cup carb/meal)    Follow-Up: 1 month or PRN    Time spent with patient: 30 minutes.  Jodee Bernal      "

## 2018-01-24 NOTE — PROGRESS NOTES
"    New Bariatric Surgery Consultation Note    RE: Hilaria Bonner  MR#: [id number]  : 1990      Referring provider:       2018   Who referred you? neo chino       Chief Complaint/Reason for visit: evaluation for possible weight loss surgery    Dear Clinic, [facility] ([facility]),    I had the pleasure of seeing your patient, Hilaria Bonner, to evaluate her obesity and consider her for possible weight loss surgery. As you know, Hilaria Bonner is 27 year old.  She has a height of 5' 5.748\", a weight of 315 lbs 6.4 oz, and calculated Body mass index is 51.3 kg/(m^2).    HISTORY OF PRESENT ILLNESS:  Weight Loss History Reviewed with Patient 2018   How long have you been overweight? Since early childhood   What is the most that you have ever weighed? 325   What is the most weight you have lost? 3   I have tried the following methods to lose weight Watching portions or calories, Exercise, Atkins type diet (low carb/high protein), Pre packaged meals ex: Nutrisystem, Slimfast, OTC Medications   I have tried the following weight loss medications? (Check all that apply) None   Have you ever had weight loss surgery? No       CO-MORBIDITIES OF OBESITY INCLUDE:     2018   I have the following co-morbidities associated with obesity: High Cholesterol, Weight Bearing Joint Pain       PAST MEDICAL HISTORY:  Past Medical History:   Diagnosis Date     Earache or other ear, nose, or throat complaint 12/15    tyroid       PAST SURGICAL HISTORY:  Past Surgical History:   Procedure Laterality Date     KNEE SURGERY  most recently - [id number]    3 surgeries in [place]       FAMILY HISTORY:   Family History   Problem Relation Age of Onset     DIABETES Father      Hypertension Father      Breast Cancer Sister 26     surgery only     CANCER Sister      Coronary Artery Disease Other      paternal aunt     Thyroid Disease Other      neice     Coronary Artery Disease Other      CEREBROVASCULAR DISEASE Other "      Breast Cancer Sister      Thyroid Disease Other      CEREBROVASCULAR DISEASE No family hx of        SOCIAL HISTORY:   Social History Questions Reviewed With Patient 1/16/2018   Which best describes your employment status (select all that apply) I am unemployed   Which best describes your marital status: single   Do you have children? Yes   Who do you have in your support network that can be available to help you for the first 2 weeks after surgery? parents aunts   Who can you count on for support throughout your weight loss surgery journey? family kids   Can you afford 3 meals a day?  Yes   Can you afford 50-60 dollars a month for vitamins? No   Just moved from [place] in Dec 2017.  Lives with her parents. Job searching now.  Hx of working as CNA, or  at [employer]  2 children age 8 and 18 months.    HABITS:     1/16/2018   How often do you drink alcohol? 2-4 times a month   If you do drink alcohol, how many drinks might you have in a day? (one drink = 5 oz. wine, 1 can/bottle of beer, 1 shot liquor) 1 or 2   Do you currently use any of the following Nicotine products? cigarettes   Have you ever used any of the following nicotine products? Cigarettes   If you previously used any of these products, what year did you quit? 2018   Have you or are you currently using street drugs or prescription strength medication for which you do not have a prescription for? No   Do you have a history of chemical dependency (alcohol or drug abuse)? No       PSYCHOLOGICAL HISTORY:   Psychological History Reviewed With Patient 1/16/2018   Have you ever attempted suicide? Never.   Have you had thoughts of suicide in the past year? No   Have you ever been hospitalized for mental illness or a suicide attempt? Never.   Do you have a history of chronic pain? No   Have you ever been diagnosed with fibromyalgia? No   Are you currently being treated for any of the following? (select all that apply) I do not have a mental illness   Hx of  "treatment for PPD in 2011.     ROS:     1/16/2018   Skin:  Skin fold rashes (groin or other folds), Leg swelling   HEENT: Headaches, Dizziness/lightheadedness, Teeth, dentures, or bridges needing repairs   Musculoskeletal: Back pain, Swelling of legs   Cardiovascular: Chest pain, Shortness of breath with activity   Pulmonary: Shortness of breath with activity, Experience morning headaches   Gastrointestinal: Heartburn, Reflux, Diarrhea   Genitourinary: None of the above   Hematological: Family history of blood clots   Neurological: Migraine headaches   Female only: Excessive menstrual bleeding, Birth control       EATING BEHAVIORS:     1/16/2018   Have you or anyone else thought that you had an eating disorder? Yes   If you answered yes to the previous eating disorder question, select the types that apply from this list: Binge Eating   If you answered \"Other\" to the type of eating disorder question above, please describe what it is: taken too longto eat   Do you currently binge eat (eat a large amount of food in a short time)? No   Are you an emotional eater? No   Do you get up to eat after falling asleep? No       EXERCISE:     1/16/2018   How often do you exercise? 1 to 2 times per week   What is the duration of your exercise (in minutes)? 30 Minutes   What types of exercise do you do? walking   What keeps you from being more active?  I am as active as I can possbily be, Pain, Shortness of breath, Too tired       MEDICATIONS:  Current Outpatient Prescriptions   Medication     Multiple Vitamins-Minerals (WOMENS MULTI VITAMIN & MINERAL PO)     norethindrone-ethinyl estradiol (MICROGESTIN 1/20) 1-20 MG-MCG per tablet     No current facility-administered medications for this visit.        ALLERGIES:  No Known Allergies    LABS/IMAGING/MEDICAL RECORDS REVIEW:     PHYSICAL EXAM:  BP (!) 138/95  Pulse 89  Temp 98.4  F (36.9  C) (Oral)  Ht 5' 5.75\"  Wt (!) 315 lb 6.4 oz  LMP 12/22/2017 (Exact Date)  SpO2 97%  BMI " 51.3 kg/m2  General: NAD  Neurologic: A & O x 3, gait normal  Head: normocephalic, atraumatic  HEENT: PERRL, EOMI.   Respiratory: respirations unlabored  Abdomen: Obese, Soft NT ND   Extremities: No LE swelling   Skin: warm and dry.  No rashes on exposed skin  Psychiatric: Mentation and Affect appear normal    In summary, Hilaria Bonner has Class III obesity with a body mass index of Body mass index is 51.3 kg/(m^2). kg/m2 and the comorbidities stated above. She completed an informational seminar and is a candidate for the laparoscopic gastric sleeve.  She will have to complete the following pre-requisites:  See RD in 1 month and monthly for at least 6 months  See Joanne in 2 months for PBS f/u on [employer] meds  Labs today first floor  Start topiramate ramp to 75mg    Bariatric Task List  Status:  Is patient a candidate for bariatric surgery?:  Yes -     Status:  surgery evaluation in process -     Surgeon: Dr Lopez  -     Tentative surgery month/year: July 2018 -        Insurance: Insurance:  [employer] -        Patient Info: Initial Weight:  315 lb 6.4 oz -     Date of Initial Weight/Height:  11/24/2018 -     Goal Weight (lbs):  295 -     Required Weight Loss:  20 -     Surgery Type:  sleeve gastrectomy -        Dietician Visits: Structured weight loss required by insurance?:  Yes -     Dietician Visit 1:  Completed -     Dietician Visit 2:  Needed -     Dietician Visit 3:  Needed -     Dietician Visit 4:  Needed -     Dietician Visit 5:  Needed -     Dietician Visit 6:  Needed -     Dietician Notes:    -        Psychological Evaluation: Psych eval:  Needed -        Lab Work: Complete Blood Count:  Needed -     Comprehensive Metabolic Panel:  Needed -     Vitamin D:  Needed -     Hgb A1c:  Completed -     PTH:  Needed -     Nicotine Testing:    -  Needed   Other:    -        Consults/ Clearance: Medical Weight Management: Needed - start topiramate 1/24/18      PCP: Establish care with PCP:  Completed -     PCP letter of  "support:  Needed -        Smoking: Quit tobacco use (3 months smoke free)?:  Needed -     Quit date:  1/17/2018 -        Patient Education:  Information Session:  Completed -     Given \"Making your decision\" handout?:  Yes -     Given support group information?:  Yes -     Support plan in place?:  Completed -     Research consents signed?:  Yes -        Final Tasks:  Before surgery online class:  Needed -     Before surgery online class website link:  [email or web address]   After surgery online class:  Needed -     After surgery online class website link:  [email or web address]   Nurse visit for weigh-in and information:  Needed -     Pre-assessment clinic visit with anesthesia team for H&P:  Needed -     Final labs (Hgb, plt, T&S, UA):  Needed -        Notes:   -       Today in the office we discussed gastric sleeve surgery. Preoperative, perioperative, and postoperative processes, management, and follow up were addressed.  Risks and benefits were outlined including the risk of death, staple line leak (1-2%), PE, DVT, ulcer, worsening GERD, N/V, stricture, hernia, wound infection, weight regain, and vitamin deficiencies. I emphasized exercise and activity along with appropriate food choice as the main foundation for weight loss with surgery providing surgical reinforcement of this.  All questions were answered.  A goal sheet and support group handout were given to the patient.    Once the patient has completed the requirements in their task list and there are no further recommendations, the pt will be allowed to see the surgeon of her choice for consultation on the laparoscopic gastric sleeve surgery. Patient verbalizes understanding of the process to surgery and expectations for the postoperative period including the need for lifelong lifestyle changes, vitamin supplementation, and laboratory monitoring.       Sincerely,    Joanne Sterling PA-C    I spent a total of 30 " minutes face to face with Hilaria during today's office visit. Over 50% of this time was spent counseling the patient and/or coordinating care.

## 2018-01-24 NOTE — LETTER
January 30, 2018      TO: Hilaria Bonner  [address]   [place] [address]         Dear Ms. Hilaria Bonner,    We received and reviewed your test results done on 01/24/2018.  You may receive more than one letter if we receive the results on multiple days.  Please share all lab and test results with your primary care provider and keep a copy for your own records.        Your test results are normal except for the following addressed below:    Your Vitamin D level is low.    -Please be sure you are taking a daily multivitamin.   -Please start Vitamin D3 2,000 IU daily   -Please follow up with your primary care provider regarding these results   -Please have your Vitamin D and PTH rechecked in 2 months with your primary care provider and  fax results to [phone].    If you have any questions, feel free contact us at the Call Center [phone].      Resulted Orders   CBC with platelets   Result Value Ref Range    WBC 5.5 4.0 - 11.0 10e9/L    RBC Count 4.55 3.8 - 5.2 10e12/L    Hemoglobin 15.3 11.7 - 15.7 g/dL    Hematocrit 45.3 35.0 - 47.0 %     78 - 100 fl    MCH 33.6 (H) 26.5 - 33.0 pg    MCHC 33.8 31.5 - 36.5 g/dL    RDW 13.9 10.0 - 15.0 %    Platelet Count 269 150 - 450 10e9/L   Comprehensive metabolic panel   Result Value Ref Range    Sodium 139 133 - 144 mmol/L    Potassium 3.9 3.4 - 5.3 mmol/L    Chloride 108 94 - 109 mmol/L    Carbon Dioxide 23 20 - 32 mmol/L    Anion Gap 8 3 - 14 mmol/L    Glucose 85 70 - 99 mg/dL    Urea Nitrogen 10 7 - 30 mg/dL    Creatinine 0.70 0.52 - 1.04 mg/dL    GFR Estimate >90 >60 mL/min/1.7m2      Comment:      Non  GFR Calc    GFR Estimate If Black >90 >60 mL/min/1.7m2      Comment:       GFR Calc    Calcium 8.4 (L) 8.5 - 10.1 mg/dL    Bilirubin Total 0.5 0.2 - 1.3 mg/dL    Albumin 3.3 (L) 3.4 - 5.0 g/dL    Protein Total 7.6 6.8 - 8.8 g/dL    Alkaline Phosphatase 125 40 - 150 U/L    ALT 18 0 - 50 U/L    AST 21 0 - 45 U/L    Parathyroid Hormone Intact   Result Value Ref Range    Parathyroid Hormone Intact 188 (H) 12 - 72 pg/mL   Vitamin D Deficiency   Result Value Ref Range    Vitamin D Deficiency screening 5 (L) 20 - 75 ug/L      Comment:      Season, race, dietary intake, and treatment affect the concentration of   25-hydroxy-Vitamin D. Values may decrease during winter months and increase   during summer months. Values 20-29 ug/L may indicate Vitamin D insufficiency   and values <20 ug/L may indicate Vitamin D deficiency.  Vitamin D determination is routinely performed by an immunoassay specific for   25 hydroxyvitamin D3.  If an individual is on vitamin D2 (ergocalciferol)   supplementation, please specify 25 OH vitamin D2 and D3 level determination by   LCMSMS test [id number].           Sincerely,      Joanne Sterling PA-C

## 2018-01-24 NOTE — PATIENT INSTRUCTIONS
"GOALS:  - Eat 3 meals/day   - Avoid snacking in between meals. If hungry, have non-starchy vegetables or fruit in between meals.   - Eat slowly (20-30 minutes per meal), chewing foods well (25 chews per bite)  - Eliminate calorie-containing beverages (e.g. Crystal light, slices of fruit water)   - 9\" Plate method (1/2 non-starchy vegetables/fruit, 1/4 lean protein, 1/4 whole grain starch - no more than 1 cup carb/meal)    If you need to make or cancel an appointment, please call [phone].    Jodee Bernal RD, LD   "

## 2018-01-24 NOTE — MR AVS SNAPSHOT
After Visit Summary   1/24/2018    Hilaria Bonner    MRN: [id number]           Patient Information     Date Of Birth          1990        Visit Information        Provider Department      1/24/2018 10:00 AM Joanne Sterling PA-C [facility] Surgical Weight Management        Today's Diagnoses     Morbid obesity (H)    -  1    Tobacco abuse          Care Instructions    See HILARY in 1 month and monthly for at least 6 months  See Joanne in 2 months for PBS f/u on MWM meds  Labs today first floor  Start topiramate ramp to 75mg    MEDICATION STARTED AT THIS APPOINTMENT  We are starting topiramate at bedtime.  Start one tab, 25 mg, for a week. Go up to 50 mg (2 tabs) for the next week. At the third week, take   3 tabs (75 mg).  Stay at 3 tabs until you are seen again. Call the nurse at [phone] if you have any questions or concerns. (Do not stop taking it if you don't think it's working. For some people it works even though they do not feel much different.)    Topiramate (Topamax) is a medication that is used most often to treat migraine headaches or for seizures. It has also been found to help with weight loss. Although it's not currently FDA approved for weight loss, it has been used safely for a number of years to help people who are carrying extra weight.     Just how topiramate helps with weight loss has not been exactly determined. However it seems to work on areas of the brain to quiet down signals related to eating.      Topiramate may make you:    >feel less interest in eating in between meals   >think less about food and eating   >find it easier to push the plate away   >find giving up pop easier    >have an easier time eating less    For some of our patients, the pills work right away. They feel and think quite differently about food. Other patients don't feel much of a change but find in fact they have lost weight! Like all weight loss medications, topiramate works best when you help  it work.  This means:    1) Have less tempting high calorie (fattening) food around the house or office    2) Have lower calorie food (fruits, vegetables,low fat meats and dairy) for snacks    3) Eat out only one time or less each week.   4) Eat your meals at a table with the TV or computer off.    Side-effects. Topiramate is generally well tolerated. The main side-effects we see are:   Tingling in hands,feet, or face (usually not very troublesome)   Mental confusion and word finding trouble (about 10% of patients have this.)     Feeling sleepy or a bit dopey- this goes away very soon after starting.    One of the dangers of topiramate is the possibility of birth defects--if you get pregnant when you are on it, there is the risk that your baby will be born with a cleft lip or palate.  If you are on topiramate and of child bearing age, you need to be on a reliable form of birth control or refrain from sexual intercourse.     Please refer to the pharmacy insert for more information on side-effects. Since many pharmacists are not familiar with the use of topiramate in weight loss, calling the clinic will get you the most accurate information on the use of this medication for weight loss.     In order to get refills of this or any medication we prescribe you must be seen in the medical weight mgmt clinic every 2-3 months. Please have your pharmacy fax a refill request to [phone].    Bariatric Task List  Status:  Is patient a candidate for bariatric surgery?:  Yes -     Status:  surgery evaluation in process -     Surgeon: Dr Lopez  -     Tentative surgery month/year: July 2018 -        Insurance: Insurance:  [facility] -        Patient Info: Initial Weight:  315 lb 6.4 oz -     Date of Initial Weight/Height:  11/24/2018 -     Goal Weight (lbs):  295 -     Required Weight Loss:  20 -     Surgery Type:  sleeve gastrectomy -        Dietician Visits: Structured weight loss required by insurance?:  Yes -     Dietician Visit  "1:  Completed -     Dietician Visit 2:  Needed -     Dietician Visit 3:  Needed -     Dietician Visit 4:  Needed -     Dietician Visit 5:  Needed -     Dietician Visit 6:  Needed -     Dietician Notes:    -        Psychological Evaluation: Psych eval:  Needed -        Lab Work: Complete Blood Count:  Needed -     Comprehensive Metabolic Panel:  Needed -     Vitamin D:  Needed -     Hgb A1c:  Completed -     PTH:  Needed -     Nicotine Testing:    -  Needed   Other:    -        Consults/ Clearance: Medical Weight Management: Needed - start topiramate 1/24/18      PCP: Establish care with PCP:  Completed -     PCP letter of support:  Needed -        Smoking: Quit tobacco use (3 months smoke free)?:  Needed -     Quit date:  1/17/2018 -        Patient Education:  Information Session:  Completed -     Given \"Making your decision\" handout?:  Yes -     Given support group information?:  Yes -     Support plan in place?:  Completed -     Research consents signed?:  Yes -        Final Tasks:  Before surgery online class:  Needed -     Before surgery online class website link:  [email or web address]   After surgery online class:  Needed -     After surgery online class website link:  [email or web address]   Nurse visit for weigh-in and information:  Needed -     Pre-assessment clinic visit with anesthesia team for H&P:  Needed -     Final labs (Hgb, plt, T&S, UA):  Needed -        Notes:   -                     Follow-ups after your visit        Follow-up notes from your care team     Return in about 2 months (around 3/24/2018).      Your next 10 appointments already scheduled     Jan 24, 2018 10:30 AM CST   (Arrive by 10:15 AM)   NUTRITION VISIT with Susan Stephens RD   [facility] Surgical Weight Management ([facility])    [address] [place] [address] [phone]              Future tests that were ordered for you today     Open " "Future Orders        Priority Expected Expires Ordered    Nicotine and Cotinine Urine Routine  4/24/2018 1/24/2018            Who to contact     Please call your clinic at [phone] to:    Ask questions about your health    Make or cancel appointments    Discuss your medicines    Learn about your test results    Speak to your doctor   If you have compliments or concerns about an experience at your clinic, or if you wish to file a complaint, please contact [facility] Patient Relations at [phone] or email us at [email or web address]         Additional Information About Your Visit        [employer] Information     [employer] gives you secure access to your electronic health record. If you see a primary care provider, you can also send messages to your care team and make appointments. If you have questions, please call your primary care clinic.  If you do not have a primary care provider, please call [phone] and they will assist you.      [employer] is an electronic gateway that provides easy, online access to your medical records. With [employer], you can request a clinic appointment, read your test results, renew a prescription or communicate with your care team.     To access your existing account, please contact your [facility] Clinic or call [phone] for assistance.        Care EveryWhere ID     This is your Care EveryWhere ID. This could be used by other organizations to access your [place] medical records  [id number]        Your Vitals Were     Pulse Temperature Height Last Period Pulse Oximetry BMI (Body Mass Index)    89 98.4  F (36.9  C) (Oral) 5' 5.75\" 12/22/2017 (Exact Date) 97% 51.3 kg/m2       Blood Pressure from Last 3 Encounters:   01/24/18 (!) 138/95   01/12/18 122/86   03/08/17 118/86    Weight from Last 3 Encounters:   01/24/18 (!) 315 lb 6.4 oz   01/12/18 (!) 317 lb 4.8 oz   12/14/16 (!) 322 lb 1.6 oz              We Performed the " Following     CBC with platelets     Comprehensive metabolic panel     Parathyroid Hormone Intact     Vitamin D Deficiency          Today's Medication Changes          These changes are accurate as of 1/24/18 10:23 AM.  If you have any questions, ask your nurse or doctor.               Start taking these medicines.        Dose/Directions    topiramate 25 MG tablet   Commonly known as:  TOPAMAX   Used for:  Morbid obesity (H)   Started by:  Joanne Sterling PA-C        25mg at bedtime for week 1, 50mg at bedtime for 1 week, and 75mg at bedtime thereafter   Quantity:  90 tablet   Refills:  1            Where to get your medicines      These medications were sent to [facility] [address] [place] [address] [employer] [facility] [employer] [place] [address] [employer] [facility], [facility] [address]     Phone:  [phone]     topiramate 25 MG tablet                Primary Care Provider Office Phone # Fax #    [facility] [phone]       [address] [place] [address]        Equal Access to Services     LYNN LIGHT : Hadii dario ku hadasho Soomaali, waaxda luqadaha, qaybta kaalmada adeegyada, waxay idiin haygary marsh . So [facility] [phone].    ATENCIÓN: Si habla español, tiene a elizabeth disposición servicios gratuitos de asistencia lingüística. LlK[facility] [phone].    We comply with applicable federal civil rights laws and [place] laws. We do not discriminate on the basis of race, color, national origin, age, disability, sex, sexual orientation, or gender identity.            Thank you!     Thank you for choosing [facility] SURGICAL WEIGHT MANAGEMENT  for your care. Our goal is always to provide you with excellent care. Hearing back from our patients is one way we can continue to improve our services. Please take a few minutes to complete the written survey that you may receive in the mail after your visit with us. Thank you!             Your  Updated Medication List - Protect others around you: Learn how to safely use, store and throw away your medicines at www.disposemymeds.org.          This list is accurate as of 1/24/18 10:23 AM.  Always use your most recent med list.                   Brand Name Dispense Instructions for use Diagnosis    norethindrone-ethinyl estradiol 1-20 MG-MCG per tablet    MICROGESTIN 1/20    63 tablet    Take 1 tablet by mouth daily    Irregular menstrual cycle       topiramate 25 MG tablet    TOPAMAX    90 tablet    25mg at bedtime for week 1, 50mg at bedtime for 1 week, and 75mg at bedtime thereafter    Morbid obesity (H)       WOMENS MULTI VITAMIN & MINERAL PO

## 2018-01-24 NOTE — NURSING NOTE
"(   Chief Complaint   Patient presents with     Consult     NBS, BMI 50.27/ referred by Dr. Rishi Cody    )    ( Weight: (!) 315 lb 6.4 oz )  ( Height: 5' 5.75\" )  ( BMI (Calculated): 51.41 )  ( Initial Weight: 315 lb 6.4 oz )  ( Cumulative weight loss (lbs): 0 )  (   )  (   )  ( Waist Circumference (cm): 127.5 cm )  (   )    ( BP: (!) 138/95 )  (   )  ( Temp: 98.4  F (36.9  C) )  ( Temp src: Oral )  ( Pulse: 89 )  (   )  ( SpO2: 97 % )    (   Patient Active Problem List   Diagnosis     Morbid obesity (H)    )  (   Current Outpatient Prescriptions   Medication Sig Dispense Refill     Multiple Vitamins-Minerals (WOMENS MULTI VITAMIN & MINERAL PO)        norethindrone-ethinyl estradiol (MICROGESTIN 1/20) 1-20 MG-MCG per tablet Take 1 tablet by mouth daily 63 tablet 4    )  ( Diabetes Eval:    )    ( Pain Eval:  Data Unavailable )    ( Wound Eval:       )    (   History   Smoking Status     Former Smoker     Years: 1.00     Start date: 11/20/2016     Quit date: 1/12/2018   Smokeless Tobacco     Former User     Quit date: 1/12/2018    )    ( Signed By:  Rosa Lopez; January 24, 2018; 9:56 AM )    "

## 2018-01-25 ENCOUNTER — OFFICE VISIT (OUTPATIENT)
Dept: FAMILY MEDICINE | Facility: CLINIC | Age: 28
End: 2018-01-25
Payer: COMMERCIAL

## 2018-01-25 VITALS
DIASTOLIC BLOOD PRESSURE: 86 MMHG | WEIGHT: 293 LBS | BODY MASS INDEX: 51.74 KG/M2 | OXYGEN SATURATION: 95 % | TEMPERATURE: 98 F | HEART RATE: 86 BPM | SYSTOLIC BLOOD PRESSURE: 134 MMHG

## 2018-01-25 DIAGNOSIS — E55.9 VITAMIN D DEFICIENCY: ICD-10-CM

## 2018-01-25 DIAGNOSIS — Z71.6 ENCOUNTER FOR SMOKING CESSATION COUNSELING: ICD-10-CM

## 2018-01-25 DIAGNOSIS — R79.89 ELEVATED PARATHYROID HORMONE: Primary | ICD-10-CM

## 2018-01-25 DIAGNOSIS — E66.01 MORBID OBESITY (H): ICD-10-CM

## 2018-01-25 DIAGNOSIS — N92.1 MENORRHAGIA WITH IRREGULAR CYCLE: ICD-10-CM

## 2018-01-25 PROCEDURE — 99214 OFFICE O/P EST MOD 30 MIN: CPT | Performed by: PHYSICIAN ASSISTANT

## 2018-01-25 RX ORDER — VARENICLINE TARTRATE 1 MG/1
1 TABLET, FILM COATED ORAL 2 TIMES DAILY
Qty: 56 TABLET | Refills: 2 | Status: SHIPPED | OUTPATIENT
Start: 2018-01-25 | End: 2019-03-07

## 2018-01-25 RX ORDER — ERGOCALCIFEROL 1.25 MG/1
50000 CAPSULE, LIQUID FILLED ORAL WEEKLY
Qty: 12 CAPSULE | Refills: 0 | Status: SHIPPED | OUTPATIENT
Start: 2018-01-25 | End: 2018-04-13

## 2018-01-25 NOTE — PATIENT INSTRUCTIONS
Take Vitamin D 50,000 units every week for 12 weeks, then after that take 2,000 units daily to maintain levels     Take 2 contraception tablets daily for 3 days to help stop spotting, then switch back to 1 tablet daily and continue the pack as usual     Start Chantix   Follow up in 1 month if not satisfied with this treatment     Make appointment with endocrinology to discuss parathyroid hormone results

## 2018-01-25 NOTE — LETTER
[address] [address]  Phone: [phone]    January 25, 2018        Hilaria Bonner  [address]   [facility] [address]          To whom it may concern:    RE: Hilaria Bonner    Patient has had long standing history of morbid obesity since early childhood. Patient has tried numerous diets and regular exercise to help lose weight without success. Patient has been meeting with dietitian on a regular basis. Patient will also consult with endocrinology to discuss elevated parathyroid hormone levels in near future and get cleared to proceed with surgery.    Patient is UTD of all age appropriate screening and immunizations.  I support patient's decision for weight loss surgery.     Please contact me for questions or concerns.      Sincerely,        Denise Cody PAC

## 2018-01-25 NOTE — MR AVS SNAPSHOT
After Visit Summary   1/25/2018    Hilaria Bonner    MRN: [id number]           Patient Information     Date Of Birth          1990        Visit Information        Provider Department      1/25/2018 9:40 AM Terri Cody PA-C [facility]        Today's Diagnoses     Need for prophylactic vaccination and inoculation against influenza    -  1    Elevated parathyroid hormone        Vitamin D deficiency        Encounter for smoking cessation counseling        Menorrhagia with irregular cycle          Care Instructions    Take Vitamin D 50,000 units every week for 12 weeks, then after that take 2,000 units daily to maintain levels     Take 2 contraception tablets daily for 3 days to help stop spotting, then switch back to 1 tablet daily and continue the pack as usual     Start Chantix   Follow up in 1 month if not satisfied with this treatment     Make appointment with endocrinology to discuss parathyroid hormone results           Follow-ups after your visit        Additional Services     ENDOCRINOLOGY ADULT REFERRAL       Your provider has referred you to: [facility]: Endocrinology and Diabetes Clinic [facility] [phone]   [email or web address]  [facility]: [facility] [phone]   [email or web address]  [facility]: Endocrinology Clinic [facility] [phone]   [email or web address]      Please be aware that coverage of these services is subject to the terms and limitations of your health insurance plan.  Call member services at your health plan with any benefit or coverage questions.      Please bring the following to your appointment:    >>   Any x-rays, CTs or MRIs which have been performed.  Contact the facility where they were done to arrange for  prior to your scheduled appointment.    >>   List of current  medications   >>   This referral request   >>   Any documents/labs given to you for this referral                  Your next 10 appointments already scheduled     Jan 29, 2018  6:40 PM CST   MyChart Long with Marifer Masters MD   [facility] ([facility])    [address] [place] [address]   [phone]            Feb 27, 2018 10:00 AM CST   (Arrive by 9:45 AM)   NUTRITION VISIT with Susan Stephens RD   [facility] Surgical Weight Management ([facility])    [address] [place] [address]   [phone]            Mar 26, 2018  8:45 AM CDT   (Arrive by 8:30 AM)   Pre Bariatric Surgery with Joanne Sterling PA-C   [facility] ([facility])    [address] [place] [address]   [phone]            Jun 01, 2018 11:00 AM CDT   (Arrive by 10:45 AM)   Bariatric Surgery Evaluation Interview with Yessy Emerson, PhD   [facility] ([facility])    [address] [place] [address]   [phone]            Jun 01, 2018 12:00 PM CDT   (Arrive by 11:45 AM)   Bariatric Surgery Evaluation Testing with Yessy Emerson, PhD   [facility] ([facility])    [address] [place] [address]   [phone]              Future tests that were ordered for you today     Open Future Orders        Priority Expected Expires Ordered    Nicotine and Cotinine Urine Routine  1/24/2019 1/24/2018            Who to contact     If you have questions or need follow up information about today's clinic visit or your schedule please contact [facility] directly at [phone].  Normal or non-critical lab and imaging results will be communicated to you by MyChart, letter or  phone within 4 business days after the clinic has received the results. If you do not hear from us within 7 days, please contact the clinic through [employer] or phone. If you have a critical or abnormal lab result, we will notify you by phone as soon as possible.  Submit refill requests through [place] or call your pharmacy and they will forward the refill request to us. Please allow 3 business days for your refill to be completed.          Additional Information About Your Visit        [employer] Information     [employer] gives you secure access to your electronic health record. If you see a primary care provider, you can also send messages to your care team and make appointments. If you have questions, please call your primary care clinic.  If you do not have a primary care provider, please call [phone] and they will assist you.        Care EveryWhere ID     This is your Care EveryWhere ID. This could be used by other organizations to access your [place] medical records  [id number]        Your Vitals Were     Pulse Temperature Last Period Pulse Oximetry BMI (Body Mass Index)       86 98  F (36.7  C) (Oral) 01/10/2018 (Within Days) 95% 51.74 kg/m2        Blood Pressure from Last 3 Encounters:   01/25/18 134/86   01/24/18 (!) 138/95   01/12/18 122/86    Weight from Last 3 Encounters:   01/25/18 (!) 318 lb 1.6 oz (144.3 kg)   01/24/18 (!) 315 lb 6.4 oz (143.1 kg)   01/12/18 (!) 317 lb 4.8 oz (143.9 kg)              We Performed the Following     ENDOCRINOLOGY ADULT REFERRAL          Today's Medication Changes          These changes are accurate as of 1/25/18 10:45 AM.  If you have any questions, ask your nurse or doctor.               Start taking these medicines.        Dose/Directions    * varenicline 0.5 MG X 11 & 1 MG X 42 tablet   Commonly known as:  CHANTIX STARTING MONTH PAK   Used for:  Encounter for smoking cessation counseling   Started by:  Terri Cody PA-C        Take 0.5 mg tab daily  for 3 days, then 0.5 mg tab twice daily for 4 days, then 1 mg twice daily.   Quantity:  53 tablet   Refills:  0       * varenicline 1 MG tablet   Commonly known as:  CHANTIX   Used for:  Encounter for smoking cessation counseling   Started by:  Terri Cody PA-C        Dose:  1 mg   Take 1 tablet (1 mg) by mouth 2 times daily   Quantity:  56 tablet   Refills:  2       vitamin D 70525 UNIT capsule   Commonly known as:  ERGOCALCIFEROL   Used for:  Vitamin D deficiency   Started by:  Terri Cody PA-C        Dose:  67506 Units   Take 1 capsule (50,000 Units) by mouth once a week for 12 doses   Quantity:  12 capsule   Refills:  0       * Notice:  This list has 2 medication(s) that are the same as other medications prescribed for you. Read the directions carefully, and ask your doctor or other care provider to review them with you.         Where to get your medicines      [facility] [place] [id number]     Phone:  [phone]     varenicline 0.5 MG X 11 & 1 MG X 42 tablet    varenicline 1 MG tablet    vitamin D 54602 UNIT capsule                Primary Care Provider Office Phone # Fax #    [facility] [phone] [address] [facility] [address]        Equal Access to Services     [facility] CenterBRIAN AH: Hadii dario ku hadasho Somjali, waaxda luqadaha, qaybta kaalmada adeegyada, laura vaughan adechetna aparicio. So [facility] [phone].    ATENCIÓN: Si habla español, tiene a elizabeth disposición servicios gratuitos de asistencia lingüística. Debbi al [phone].    We comply with applicable federal civil rights laws and [place] laws. We do not discriminate on the basis of race, color, national origin, age, disability, sex, sexual orientation, or gender identity.            Thank you!     Thank you for  choosing [facility]  for your care. Our goal is always to provide you with excellent care. Hearing back from our patients is one way we can continue to improve our services. Please take a few minutes to complete the written survey that you may receive in the mail after your visit with us. Thank you!             Your Updated Medication List - Protect others around you: Learn how to safely use, store and throw away your medicines at www.disposemymeds.org.          This list is accurate as of 1/25/18 10:45 AM.  Always use your most recent med list.                   Brand Name Dispense Instructions for use Diagnosis    norethindrone-ethinyl estradiol 1-20 MG-MCG per tablet    MICROGESTIN 1/20    63 tablet    Take 1 tablet by mouth daily    Irregular menstrual cycle       topiramate 25 MG tablet    TOPAMAX    90 tablet    25mg at bedtime for week 1, 50mg at bedtime for 1 week, and 75mg at bedtime thereafter    Morbid obesity (H)       * varenicline 0.5 MG X 11 & 1 MG X 42 tablet    CHANTIX STARTING MONTH AMADO    53 tablet    Take 0.5 mg tab daily for 3 days, then 0.5 mg tab twice daily for 4 days, then 1 mg twice daily.    Encounter for smoking cessation counseling       * varenicline 1 MG tablet    CHANTIX    56 tablet    Take 1 tablet (1 mg) by mouth 2 times daily    Encounter for smoking cessation counseling       vitamin D 24576 UNIT capsule    ERGOCALCIFEROL    12 capsule    Take 1 capsule (50,000 Units) by mouth once a week for 12 doses    Vitamin D deficiency       WOMENS MULTI VITAMIN & MINERAL PO           * Notice:  This list has 2 medication(s) that are the same as other medications prescribed for you. Read the directions carefully, and ask your doctor or other care provider to review them with you.

## 2018-01-25 NOTE — PROGRESS NOTES
SUBJECTIVE:   Hilaria Bonner is a 27 year old female who presents to clinic today for the following health issues:  1.Would like to discuss her test results from last office visit and yesterday visit with weight loss clinic. Patient also needs  a letter for weight loss procedure  2.Would like to discuss starting medication to help her stop smoking. Patient has stopped cold turkey 1 week ago, but has cravings and feels that she will not be able to quit on her own.     Irregular uterine bleeding       Duration: 12 months on and off since she stopped Depo shots .     Description (location/character/radiation): irregular bleeding that ranges from light spotting to heavy flow that last 1-3 days.     It has gotten better since she got on Loestrin 12 days ago but has not fully resolved.     Intensity:  moderate    Accompanying signs and symptoms: none    History (similar episodes/previous evaluation): as above    Precipitating or alleviating factors: depo shots    Therapies tried and outcome: started Loestrin 12 days ago its helping but not resolved spotting completely       Problem list and histories reviewed & adjusted, as indicated.  Additional history: as documented    Patient Active Problem List   Diagnosis     Morbid obesity (H)     Vitamin D deficiency     Elevated parathyroid hormone     Encounter for smoking cessation counseling     Menorrhagia with irregular cycle     Past Surgical History:   Procedure Laterality Date     GYN SURGERY      2 C-sections     KNEE SURGERY  most recently - [id number]    3 surgeries in [place]     ORTHOPEDIC SURGERY      2 knee meniscus surgery       Social History   Substance Use Topics     Smoking status: Former Smoker     Years: 1.00     Start date: 11/20/2016     Quit date: 1/12/2018     Smokeless tobacco: Former User     Quit date: 1/12/2018     Alcohol use 0.0 oz/week      Comment: occasional     Family History   Problem Relation Age of Onset     DIABETES Father      Hypertension  Father      Breast Cancer Sister 26     surgery only     CANCER Sister      Coronary Artery Disease Other      paternal aunt     Thyroid Disease Other      neice     Coronary Artery Disease Other      CEREBROVASCULAR DISEASE Other      Breast Cancer Sister      Thyroid Disease Other      CEREBROVASCULAR DISEASE No family hx of          Current Outpatient Prescriptions   Medication Sig Dispense Refill     vitamin D (ERGOCALCIFEROL) 40321 UNIT capsule Take 1 capsule (50,000 Units) by mouth once a week for 12 doses 12 capsule 0     varenicline (CHANTIX STARTING MONTH AMADO) 0.5 MG X 11 & 1 MG X 42 tablet Take 0.5 mg tab daily for 3 days, then 0.5 mg tab twice daily for 4 days, then 1 mg twice daily. 53 tablet 0     varenicline (CHANTIX) 1 MG tablet Take 1 tablet (1 mg) by mouth 2 times daily 56 tablet 2     topiramate (TOPAMAX) 25 MG tablet 25mg at bedtime for week 1, 50mg at bedtime for 1 week, and 75mg at bedtime thereafter 90 tablet 1     Multiple Vitamins-Minerals (WOMENS MULTI VITAMIN & MINERAL PO)        norethindrone-ethinyl estradiol (MICROGESTIN 1/20) 1-20 MG-MCG per tablet Take 1 tablet by mouth daily 63 tablet 4     No Known Allergies         ROS:  Constitutional, HEENT, cardiovascular, pulmonary, GI, , musculoskeletal, neuro, skin, endocrine and psych systems are negative, except as otherwise noted.    OBJECTIVE:     /86 (BP Location: Right arm, Patient Position: Chair, Cuff Size: Adult Large)  Pulse 86  Temp 98  F (36.7  C) (Oral)  Wt (!) 318 lb 1.6 oz (144.3 kg)  LMP 01/10/2018 (Within Days)  SpO2 95%  BMI 51.74 kg/m2  Body mass index is 51.74 kg/(m^2).  GENERAL: obese. alert and no distress  NECK: no adenopathy, no asymmetry, masses, or scars and thyroid normal to palpation  RESP: lungs clear to auscultation - no rales, rhonchi or wheezes  CV: regular rate and rhythm, normal S1 S2, no S3 or S4, no murmur, click or rub, no peripheral edema and peripheral pulses strong  MS: no gross  musculoskeletal defects noted, no edema    Diagnostic Test Results:  none     ASSESSMENT/PLAN:       ICD-10-CM    1. Elevated parathyroid hormone E34.9 ENDOCRINOLOGY ADULT REFERRAL   2. Vitamin D deficiency E55.9 vitamin D (ERGOCALCIFEROL) 46093 UNIT capsule   3. Encounter for smoking cessation counseling Z71.6 varenicline (CHANTIX STARTING MONTH AMADO) 0.5 MG X 11 & 1 MG X 42 tablet    Z72.0 varenicline (CHANTIX) 1 MG tablet   4. Menorrhagia with irregular cycle N92.1    5. Morbid obesity (H) E66.01      1. Patient will see endocrinology to discuss results   Referral was provided   2. Take Vitamin D 50,000 units every week for 12 weeks, then after that take 2,000 units daily to maintain levels   3.Start Chantix   Follow up in 1 month if not satisfied with this treatment   SE and black box warning were discussed in detail with the patient.   4.improving.   Take 2 contraception tablets daily for 3 days to help stop spotting, then switch back to 1 tablet daily and continue the pack as usual   Discussed that it may take a few cycles before period stabilizes  5. Letter and requested paperwork were printed for the patient.     Terri Cody PA-C  [facility]

## 2018-02-05 ENCOUNTER — TELEPHONE (OUTPATIENT)
Dept: SURGERY | Facility: CLINIC | Age: 28
End: 2018-02-05

## 2018-02-05 NOTE — TELEPHONE ENCOUNTER
PREVISIT INFORMATION                                                    Hilaria Bonner scheduled for future visit at [facility] specialty clinics.    Patient is scheduled to see Dr. Rollins  on 2/8/18  Reason for visit: Elevated PTH  Referring provider   Has patient seen previous specialist? No  Medical Records:      REVIEW                                                      New patient packet mailed to patient: N/A  Medication reconciliation complete: N/A      Current Outpatient Prescriptions   Medication Sig Dispense Refill     vitamin D (ERGOCALCIFEROL) 04367 UNIT capsule Take 1 capsule (50,000 Units) by mouth once a week for 12 doses 12 capsule 0     varenicline (CHANTIX STARTING MONTH AMADO) 0.5 MG X 11 & 1 MG X 42 tablet Take 0.5 mg tab daily for 3 days, then 0.5 mg tab twice daily for 4 days, then 1 mg twice daily. 53 tablet 0     varenicline (CHANTIX) 1 MG tablet Take 1 tablet (1 mg) by mouth 2 times daily 56 tablet 2     topiramate (TOPAMAX) 25 MG tablet 25mg at bedtime for week 1, 50mg at bedtime for 1 week, and 75mg at bedtime thereafter 90 tablet 1     Multiple Vitamins-Minerals (WOMENS MULTI VITAMIN & MINERAL PO)        norethindrone-ethinyl estradiol (MICROGESTIN 1/20) 1-20 MG-MCG per tablet Take 1 tablet by mouth daily 63 tablet 4       Allergies: Review of patient's allergies indicates no known allergies.        PLAN/FOLLOW-UP NEEDED                                                      Nurse called and left message on  with date/time of upcoming appointment, along with number to return call if additional records need to be obtain or appointment needs to be cancelled/rescheduled.    Patient Reminders Given:  Please, make sure you bring an updated list of your medications.   If you are having a procedure, please, present 15 minutes early.  If you need to cancel or reschedule,please call [phone].    Elisa Rivas

## 2018-02-08 ENCOUNTER — OFFICE VISIT (OUTPATIENT)
Dept: SURGERY | Facility: CLINIC | Age: 28
End: 2018-02-08
Attending: PHYSICIAN ASSISTANT
Payer: COMMERCIAL

## 2018-02-08 VITALS
OXYGEN SATURATION: 97 % | HEIGHT: 66 IN | DIASTOLIC BLOOD PRESSURE: 83 MMHG | HEART RATE: 93 BPM | BODY MASS INDEX: 47.09 KG/M2 | WEIGHT: 293 LBS | SYSTOLIC BLOOD PRESSURE: 137 MMHG

## 2018-02-08 DIAGNOSIS — E55.9 HYPOVITAMINOSIS D: Primary | ICD-10-CM

## 2018-02-08 PROCEDURE — 99203 OFFICE O/P NEW LOW 30 MIN: CPT | Performed by: SURGERY

## 2018-02-08 ASSESSMENT — PAIN SCALES - GENERAL: PAINLEVEL: NO PAIN (0)

## 2018-02-08 NOTE — NURSING NOTE
"Hilaria Bonnre's goals for this visit include:   Chief Complaint   Patient presents with     Consult     Elevated PTH       She requests these members of her care team be copied on today's visit information: Yes    PCP: [facility]    Referring Provider:  Terri Cody PA-C  [address] [place] [address]    Chief Complaint   Patient presents with     Consult     Elevated PTH       Initial /83 (BP Location: Left arm, Patient Position: Sitting, Cuff Size: Adult Large)  Pulse 93  Ht 1.67 m (5' 5.75\")  Wt (!) 144.3 kg (318 lb 1.6 oz)  LMP 01/10/2018 (Within Days)  SpO2 97%  BMI 51.73 kg/m2 Estimated body mass index is 51.73 kg/(m^2) as calculated from the following:    Height as of this encounter: 1.67 m (5' 5.75\").    Weight as of this encounter: 144.3 kg (318 lb 1.6 oz).  Medication Reconciliation: complete    Do you need any medication refills at today's visit? No    "

## 2018-02-08 NOTE — MR AVS SNAPSHOT
After Visit Summary   2/8/2018    Hilaria Bonner    MRN: [id number]           Patient Information     Date Of Birth          1990        Visit Information        Provider Department      2/8/2018 2:00 PM Lea Rollins MD [facility]        Today's Diagnoses     Hypovitaminosis D    -  1       Follow-ups after your visit        Your next 10 appointments already scheduled     Mar 28, 2018 10:00 AM CDT   (Arrive by 9:45 AM)   NUTRITION VISIT with Susan Stephens RD   [facility] ([facility])    [address] [place] [address]   [phone]            Mar 28, 2018 10:30 AM CDT   (Arrive by 10:15 AM)   Pre Bariatric Surgery with Joanne Sterling PA-C   [facility] ([facility])    [address] [place] [address]   [phone]            Jun 01, 2018 11:00 AM CDT   (Arrive by 10:45 AM)   Bariatric Surgery Evaluation Interview with Yessy Emerson, PhD   [facility] ([facility])    [address] [place] [address]   [phone]            Jun 01, 2018 12:00 PM CDT   (Arrive by 11:45 AM)   Bariatric Surgery Evaluation Testing with Yessy Emerson, PhD   [facility] ([facility])    [address] [place] [address]   [phone]              Who to contact     If you have questions or need follow up information about today's clinic visit or your schedule please contact [facility] directly at [phone].  Normal or non-critical lab and imaging results will be communicated to you by MyChart, letter or phone within 4 business days after the clinic has received the results. If you do not hear from us within 7 days, please contact the clinic through  "MyChart or phone. If you have a critical or abnormal lab result, we will notify you by phone as soon as possible.  Submit refill requests through [employer] or call your pharmacy and they will forward the refill request to us. Please allow 3 business days for your refill to be completed.          Additional Information About Your Visit        [employer] Information     [employer] gives you secure access to your electronic health record. If you see a primary care provider, you can also send messages to your care team and make appointments. If you have questions, please call your primary care clinic.  If you do not have a primary care provider, please call [phone] and they will assist you.      [employer] is an electronic gateway that provides easy, online access to your medical records. With [employer], you can request a clinic appointment, read your test results, renew a prescription or communicate with your care team.     To access your existing account, please contact your [facility] or call [phone] for assistance.        Care EveryWhere ID     This is your Care EveryWhere ID. This could be used by other organizations to access your [place] medical records  [id number]        Your Vitals Were     Pulse Height Last Period Pulse Oximetry BMI (Body Mass Index)       93 1.67 m (5' 5.75\") 01/10/2018 (Within Days) 97% 51.73 kg/m2        Blood Pressure from Last 3 Encounters:   02/22/18 138/90   02/08/18 137/83   01/25/18 134/86    Weight from Last 3 Encounters:   02/27/18 (!) 144.1 kg (317 lb 11.2 oz)   02/22/18 (!) 143.8 kg (317 lb)   02/08/18 (!) 144.3 kg (318 lb 1.6 oz)              Today, you had the following     No orders found for display       Primary Care Provider Office Phone # Fax #    [facility] [phone] [address] [facility] [address]        Equal Access to Services     JEREMÍAS LIGHT AH: cortney Combs, " shante ann davinlaura apple everin hayaan adeeg kharash la'aan ah. So [facility] [phone].    ATENCIÓN: Si blake matias, tiene a elizabeth disposición servicios gratuitos de asistencia lingüística. Debbi al [phone].    We comply with applicable federal civil rights laws and [place] laws. We do not discriminate on the basis of race, color, national origin, age, disability, sex, sexual orientation, or gender identity.            Thank you!     Thank you for choosing [facility]  for your care. Our goal is always to provide you with excellent care. Hearing back from our patients is one way we can continue to improve our services. Please take a few minutes to complete the written survey that you may receive in the mail after your visit with us. Thank you!             Your Updated Medication List - Protect others around you: Learn how to safely use, store and throw away your medicines at www.disposemymeds.org.          This list is accurate as of 2/8/18 11:59 PM.  Always use your most recent med list.                   Brand Name Dispense Instructions for use Diagnosis    norethindrone-ethinyl estradiol 1-20 MG-MCG per tablet    MICROGESTIN 1/20    63 tablet    Take 1 tablet by mouth daily    Irregular menstrual cycle       topiramate 25 MG tablet    TOPAMAX    90 tablet    25mg at bedtime for week 1, 50mg at bedtime for 1 week, and 75mg at bedtime thereafter    Morbid obesity (H)       * varenicline 0.5 MG X 11 & 1 MG X 42 tablet    CHANTIX STARTING MONTH AMADO    53 tablet    Take 0.5 mg tab daily for 3 days, then 0.5 mg tab twice daily for 4 days, then 1 mg twice daily.    Encounter for smoking cessation counseling       * varenicline 1 MG tablet    CHANTIX    56 tablet    Take 1 tablet (1 mg) by mouth 2 times daily    Encounter for smoking cessation counseling       vitamin D 86488 UNIT capsule    ERGOCALCIFEROL    12 capsule    Take 1 capsule (50,000 Units) by mouth once a week for 12 doses     Vitamin D deficiency       WOMENS MULTI VITAMIN & MINERAL PO           * Notice:  This list has 2 medication(s) that are the same as other medications prescribed for you. Read the directions carefully, and ask your doctor or other care provider to review them with you.

## 2018-02-08 NOTE — LETTER
2/8/2018         RE: Hilaria Bonner  [address]   [facility] [facility] [address]        Dear Colleague,    Thank you for referring your patient, Hilaria Bonner, to the [facility]. Please see a copy of my visit note below.    nm     Visit Date:   02/08/2018      I was asked to see this patient for surgical options for hyperparathyroidism.  I was asked by Dr. Sterling to see this patient.  Greater than 40 minutes was spent in the care and consultation of this patient.        This is a 27-year-old female who is being evaluated for morbid obesity surgery by Dr. Sterling.  During that time the patient was noted to have an elevated parathyroid hormone level.  I should also note that the patient's calcium was 8.4, albumin was 3.3 and vitamin D was less than 5.  Her calculated for free vitamin, her total calcium with the albumin calculation is actually now 8.96.      The patient has no previous history of fractures, nephrolithiasis or osteoporosis.  There is no family history of thyroid or parathyroid disease.      PAST MEDICAL HISTORY, SURGICAL HISTORY, MEDICATIONS:  Have been reviewed and are available in the MR.      REVIEW OF SYSTEMS:  A 10-point review of systems is pertinent for that noted in the HPI.      PHYSICAL EXAMINATION:     NECK:  Soft.  There are no palpable masses present.      LABORATORY DATA:  Discussed above.      ASSESSMENT:  Secondary hyperparathyroidism.      PLAN:  I had a long discussion with the patient that indeed she has secondary hyperparathyroidism secondary to low vitamin D.  This is not a surgical issue.  This is indeed a medical issue and can be managed medically with a vitamin D supplementation.  I did encourage the patient to talk to her primary care physician about vitamin D supplementation and then have calcium, vitamin D and parathyroid hormone all have to be checked at the same time at 3-6 months after her vitamin D has been corrected.  I feel confident that the  PTH will correct and she does not have primary hyperparathyroidism.         LEA SEGURA MD             D: 2018   T: 2018   MT: MARY      Name:     RADHA JOHANSEN   MRN:      [id number]        Account:      [id number]   :      1990           Visit Date:   2018      Document: [id number]       Again, thank you for allowing me to participate in the care of your patient.        Sincerely,        Lea Segura MD

## 2018-02-22 ENCOUNTER — OFFICE VISIT (OUTPATIENT)
Dept: FAMILY MEDICINE | Facility: CLINIC | Age: 28
End: 2018-02-22
Payer: COMMERCIAL

## 2018-02-22 VITALS
OXYGEN SATURATION: 96 % | WEIGHT: 293 LBS | HEIGHT: 66 IN | DIASTOLIC BLOOD PRESSURE: 90 MMHG | BODY MASS INDEX: 47.09 KG/M2 | TEMPERATURE: 97.8 F | HEART RATE: 93 BPM | SYSTOLIC BLOOD PRESSURE: 138 MMHG

## 2018-02-22 DIAGNOSIS — N30.01 ACUTE CYSTITIS WITH HEMATURIA: Primary | ICD-10-CM

## 2018-02-22 LAB
ALBUMIN UR-MCNC: ABNORMAL MG/DL
APPEARANCE UR: ABNORMAL
BACTERIA #/AREA URNS HPF: ABNORMAL /HPF
BILIRUB UR QL STRIP: ABNORMAL
COLOR UR AUTO: YELLOW
GLUCOSE UR STRIP-MCNC: NEGATIVE MG/DL
HGB UR QL STRIP: ABNORMAL
KETONES UR STRIP-MCNC: NEGATIVE MG/DL
LEUKOCYTE ESTERASE UR QL STRIP: NEGATIVE
MUCOUS THREADS #/AREA URNS LPF: PRESENT /LPF
NITRATE UR QL: NEGATIVE
NON-SQ EPI CELLS #/AREA URNS LPF: ABNORMAL /LPF
PH UR STRIP: 5.5 PH (ref 5–7)
RBC #/AREA URNS AUTO: ABNORMAL /HPF
SOURCE: ABNORMAL
SP GR UR STRIP: >1.03 (ref 1–1.03)
UROBILINOGEN UR STRIP-ACNC: 1 EU/DL (ref 0.2–1)
WBC #/AREA URNS AUTO: ABNORMAL /HPF

## 2018-02-22 PROCEDURE — 99213 OFFICE O/P EST LOW 20 MIN: CPT | Performed by: PHYSICIAN ASSISTANT

## 2018-02-22 PROCEDURE — 87086 URINE CULTURE/COLONY COUNT: CPT | Performed by: PHYSICIAN ASSISTANT

## 2018-02-22 PROCEDURE — 81001 URINALYSIS AUTO W/SCOPE: CPT | Performed by: PHYSICIAN ASSISTANT

## 2018-02-22 RX ORDER — SULFAMETHOXAZOLE/TRIMETHOPRIM 800-160 MG
1 TABLET ORAL 2 TIMES DAILY
Qty: 14 TABLET | Refills: 0 | Status: SHIPPED | OUTPATIENT
Start: 2018-02-22 | End: 2018-03-01

## 2018-02-22 ASSESSMENT — PAIN SCALES - GENERAL: PAINLEVEL: NO PAIN (0)

## 2018-02-22 NOTE — PROGRESS NOTES
SUBJECTIVE:   Hilaria Bonner is a 27 year old female who presents to clinic today for the following health issues:      URINARY TRACT SYMPTOMS  Onset: Today    Description:   Painful urination (Dysuria): YES  Blood in urine (Hematuria): YES  Delay in urine (Hesitency): YES    Intensity: moderate    Progression of Symptoms:  same    Accompanying Signs & Symptoms:  Fever/chills: no   Flank pain no   Nausea and vomiting: no   Any vaginal symptoms: vaginal odor  Abdominal/Pelvic Pain: no     History:   History of frequent UTI's: YES  History of kidney stones: no   Sexually Active: no   Possibility of pregnancy: No    Precipitating factors:   none    Therapies Tried and outcome: Increase fluid intake        Problem list and histories reviewed & adjusted, as indicated.  Additional history: as documented    Patient Active Problem List   Diagnosis     Morbid obesity (H)     Vitamin D deficiency     Elevated parathyroid hormone     Encounter for smoking cessation counseling     Menorrhagia with irregular cycle     Past Surgical History:   Procedure Laterality Date     GYN SURGERY      2 C-sections     KNEE SURGERY  most recently - [phone]    3 surgeries in [place]     ORTHOPEDIC SURGERY      2 knee meniscus surgery       Social History   Substance Use Topics     Smoking status: Former Smoker     Years: 1.00     Start date: 11/20/2016     Quit date: 1/12/2018     Smokeless tobacco: Former User     Quit date: 1/12/2018     Alcohol use 0.0 oz/week      Comment: occasional     Family History   Problem Relation Age of Onset     DIABETES Father      Hypertension Father      Breast Cancer Sister 26     surgery only     CANCER Sister      Coronary Artery Disease Other      paternal aunt     Thyroid Disease Other      neice     Coronary Artery Disease Other      CEREBROVASCULAR DISEASE Other      Breast Cancer Sister      Thyroid Disease Other      CEREBROVASCULAR DISEASE No family hx of          Current Outpatient Prescriptions  "  Medication Sig Dispense Refill     sulfamethoxazole-trimethoprim (BACTRIM DS/SEPTRA DS) 800-160 MG per tablet Take 1 tablet by mouth 2 times daily for 7 days 14 tablet 0     vitamin D (ERGOCALCIFEROL) 18449 UNIT capsule Take 1 capsule (50,000 Units) by mouth once a week for 12 doses 12 capsule 0     varenicline (CHANTIX STARTING MONTH AMADO) 0.5 MG X 11 & 1 MG X 42 tablet Take 0.5 mg tab daily for 3 days, then 0.5 mg tab twice daily for 4 days, then 1 mg twice daily. 53 tablet 0     varenicline (CHANTIX) 1 MG tablet Take 1 tablet (1 mg) by mouth 2 times daily 56 tablet 2     topiramate (TOPAMAX) 25 MG tablet 25mg at bedtime for week 1, 50mg at bedtime for 1 week, and 75mg at bedtime thereafter 90 tablet 1     Multiple Vitamins-Minerals (WOMENS MULTI VITAMIN & MINERAL PO)        norethindrone-ethinyl estradiol (MICROGESTIN 1/20) 1-20 MG-MCG per tablet Take 1 tablet by mouth daily 63 tablet 4     No Known Allergies           ROS:  Constitutional, HEENT, cardiovascular, pulmonary, GI, , musculoskeletal, neuro, skin, endocrine and psych systems are negative, except as otherwise noted.    OBJECTIVE:     /90  Pulse 93  Temp 97.8  F (36.6  C) (Oral)  Ht 5' 5.75\" (1.67 m)  Wt (!) 317 lb (143.8 kg)  SpO2 96%  BMI 51.55 kg/m2  Body mass index is 51.55 kg/(m^2).  GENERAL: healthy, alert and no distress  NECK: no adenopathy, no asymmetry, masses, or scars and thyroid normal to palpation  RESP: lungs clear to auscultation - no rales, rhonchi or wheezes  CV: regular rate and rhythm, normal S1 S2, no S3 or S4, no murmur, click or rub, no peripheral edema and peripheral pulses strong  ABDOMEN: soft, nontender, no hepatosplenomegaly, no masses and bowel sounds normal  MS: no gross musculoskeletal defects noted, no edema  BACK: no CVA tenderness, no paralumbar tenderness    Diagnostic Test Results:  Results for orders placed or performed in visit on 02/22/18 (from the past 24 hour(s))   UA with Microscopic reflex to " Culture   Result Value Ref Range    Color Urine Yellow     Appearance Urine Slightly Cloudy     Glucose Urine Negative NEG^Negative mg/dL    Bilirubin Urine Small (A) NEG^Negative    Ketones Urine Negative NEG^Negative mg/dL    Specific Gravity Urine >1.030 1.003 - 1.035    pH Urine 5.5 5.0 - 7.0 pH    Protein Albumin Urine Trace (A) NEG^Negative mg/dL    Urobilinogen Urine 1.0 0.2 - 1.0 EU/dL    Nitrite Urine Negative NEG^Negative    Blood Urine Moderate (A) NEG^Negative    Leukocyte Esterase Urine Negative NEG^Negative    Source Midstream Urine     WBC Urine O - 2 OTO2^O - 2 /HPF    RBC Urine 2-5 (A) OTO2^O - 2 /HPF    Squamous Epithelial /LPF Urine Many (A) FEW^Few /LPF    Bacteria Urine Many (A) NEG^Negative /HPF    Mucous Urine Present (A) NEG^Negative /LPF       ASSESSMENT/PLAN:       ICD-10-CM    1. Acute cystitis with hematuria N30.01 UA with Microscopic reflex to Culture     Urine Culture Aerobic Bacterial     sulfamethoxazole-trimethoprim (BACTRIM DS/SEPTRA DS) 800-160 MG per tablet   Please take Bactrim 1 tablet  twice a day for 7 days with large amount of water  Increase fluids  Follow up or call in 3 days if not better  Urine culture is pending  For prevention wipe front to back, urinate and wash after sex.         Terri Cody PA-C  [facility]

## 2018-02-22 NOTE — MR AVS SNAPSHOT
After Visit Summary   2/22/2018    Hilaria Bonner    MRN: [id number]           Patient Information     Date Of Birth          1990        Visit Information        Provider Department      2/22/2018 3:20 PM Terri Cody PA-C [facility]        Today's Diagnoses     Urinary frequency    -  1    Acute cystitis with hematuria          Care Instructions    Please take Bactrim 1 tablet  twice a day for 7 days with large amount of water  Increase fluids  Follow up or call in 3 days if not better  Urine culture is pending  For prevention wipe front to back, urinate and wash after sex.             Follow-ups after your visit        Your next 10 appointments already scheduled     Feb 27, 2018 10:00 AM CST   (Arrive by 9:45 AM)   NUTRITION VISIT with Susan Stephens RD   [facility] ([facility])    [address] [place] [address]   [phone]            Mar 26, 2018  8:45 AM CDT   (Arrive by 8:30 AM)   Pre Bariatric Surgery with Joanne Sterling PA-C   [facility] ([facility])    [address] [place] [address]   [phone]            Jun 01, 2018 11:00 AM CDT   (Arrive by 10:45 AM)   Bariatric Surgery Evaluation Interview with Yessy Emerson, PhD   [facility] ([facility])    [address] [place] [address]   [phone]            Jun 01, 2018 12:00 PM CDT   (Arrive by 11:45 AM)   Bariatric Surgery Evaluation Testing with Yessy Emerson, PhD   [facility] ([facility])    [address] [place] [address]   [phone]              Who to contact     If you have questions or need follow up information about today's clinic visit or your schedule  "please contact [facility] directly at [phone].  Normal or non-critical lab and imaging results will be communicated to you by MyChart, letter or phone within 4 business days after the clinic has received the results. If you do not hear from us within 7 days, please contact the clinic through "[employer]hart or phone. If you have a critical or abnormal lab result, we will notify you by phone as soon as possible.  Submit refill requests through [employer] or call your pharmacy and they will forward the refill request to us. Please allow 3 business days for your refill to be completed.          Additional Information About Your Visit        "[employer]harYouneeq Information     [employer] gives you secure access to your electronic health record. If you see a primary care provider, you can also send messages to your care team and make appointments. If you have questions, please call your primary care clinic.  If you do not have a primary care provider, please call [phone] and they will assist you.        Care EveryWhere ID     This is your Care EveryWhere ID. This could be used by other organizations to access your [place] medical records  [id number]        Your Vitals Were     Pulse Temperature Height Pulse Oximetry BMI (Body Mass Index)       93 97.8  F (36.6  C) (Oral) 5' 5.75\" (1.67 m) 96% 51.55 kg/m2        Blood Pressure from Last 3 Encounters:   02/22/18 138/90   02/08/18 137/83   01/25/18 134/86    Weight from Last 3 Encounters:   02/22/18 (!) 317 lb (143.8 kg)   02/08/18 (!) 318 lb 1.6 oz (144.3 kg)   01/25/18 (!) 318 lb 1.6 oz (144.3 kg)              We Performed the Following     UA with Microscopic reflex to Culture     Urine Culture Aerobic Bacterial          Today's Medication Changes          These changes are accurate as of 2/22/18  4:04 PM.  If you have any questions, ask your nurse or doctor.               Start taking these medicines.        Dose/Directions    sulfamethoxazole-trimethoprim " 800-160 MG per tablet   Commonly known as:  BACTRIM DS/SEPTRA DS   Used for:  Acute cystitis with hematuria   Started by:  Terri Cody PA-C        Dose:  1 tablet   Take 1 tablet by mouth 2 times daily for 7 days   Quantity:  14 tablet   Refills:  0            Where to get your medicines      These medications were sent to [facility] [address] [place], [place] [address] [place] [address] [address], [facility] [address]     Phone:  [phone]     sulfamethoxazole-trimethoprim 800-160 MG per tablet                Primary Care Provider Office Phone # Fax #    [facility] [phone] [address] [facility] [address]        Equal Access to Services     JEREMÍAS LIGHT : Hadii dario osullivan hadasho Soomaali, waaxda luqadaha, qaybta kaalmada adeegyada, waxcaleb aparicio. So [facility] [phone].    ATENCIÓN: Si habla español, tiene a elizabeth disposición servicios gratuitos de asistencia lingüística. Debbi al [phone].    We comply with applicable federal civil rights laws and [place] laws. We do not discriminate on the basis of race, color, national origin, age, disability, sex, sexual orientation, or gender identity.            Thank you!     Thank you for choosing [facility]  for your care. Our goal is always to provide you with excellent care. Hearing back from our patients is one way we can continue to improve our services. Please take a few minutes to complete the written survey that you may receive in the mail after your visit with us. Thank you!             Your Updated Medication List - Protect others around you: Learn how to safely use, store and throw away your medicines at www.disposemymeds.org.          This list is accurate as of 2/22/18  4:04 PM.  Always use your most recent med list.                   Brand Name Dispense Instructions for use Diagnosis     norethindrone-ethinyl estradiol 1-20 MG-MCG per tablet    MICROGESTIN 1/20    63 tablet    Take 1 tablet by mouth daily    Irregular menstrual cycle       sulfamethoxazole-trimethoprim 800-160 MG per tablet    BACTRIM DS/SEPTRA DS    14 tablet    Take 1 tablet by mouth 2 times daily for 7 days    Acute cystitis with hematuria       topiramate 25 MG tablet    TOPAMAX    90 tablet    25mg at bedtime for week 1, 50mg at bedtime for 1 week, and 75mg at bedtime thereafter    Morbid obesity (H)       * varenicline 0.5 MG X 11 & 1 MG X 42 tablet    CHANTIX STARTING MONTH AMADO    53 tablet    Take 0.5 mg tab daily for 3 days, then 0.5 mg tab twice daily for 4 days, then 1 mg twice daily.    Encounter for smoking cessation counseling       * varenicline 1 MG tablet    CHANTIX    56 tablet    Take 1 tablet (1 mg) by mouth 2 times daily    Encounter for smoking cessation counseling       vitamin D 47805 UNIT capsule    ERGOCALCIFEROL    12 capsule    Take 1 capsule (50,000 Units) by mouth once a week for 12 doses    Vitamin D deficiency       WOMENS MULTI VITAMIN & MINERAL PO           * Notice:  This list has 2 medication(s) that are the same as other medications prescribed for you. Read the directions carefully, and ask your doctor or other care provider to review them with you.

## 2018-02-22 NOTE — PATIENT INSTRUCTIONS
Please take Bactrim 1 tablet  twice a day for 7 days with large amount of water  Increase fluids  Follow up or call in 3 days if not better  Urine culture is pending  For prevention wipe front to back, urinate and wash after sex.

## 2018-02-24 LAB
BACTERIA SPEC CULT: NORMAL
SPECIMEN SOURCE: NORMAL

## 2018-02-27 ENCOUNTER — ALLIED HEALTH/NURSE VISIT (OUTPATIENT)
Dept: SURGERY | Facility: CLINIC | Age: 28
End: 2018-02-27
Payer: COMMERCIAL

## 2018-02-27 VITALS — WEIGHT: 293 LBS | BODY MASS INDEX: 51.67 KG/M2

## 2018-02-27 NOTE — PROGRESS NOTES
"Nutrition Assessment  Reason For Visit: Nutrition Assessment bariatric surgery    Hilaria Bonner is a 27 year old presenting today for bariatric nutrition consult. Pt is interested in laparoscopic sleeve gastrectomy with Dr. Lopez expected surgery in July 2018. This is pt's 2nd of 6 required nutrition visits prior to surgery. Pt referred by JEAN Sterling PA-C.     Reviewed task list, patient has psychology appointment made for June.    Anthropometrics:  Height: 5' 5.75\"  Initial weight: 315.4 lbs   BMI: 51.3 kg/m^2  Current weight: 317.7 lbs (+2.3 lbs since initial)    Required weight loss goal pre-op: -20.4 lbs from initial weight; goal weight of 295 lbs.     Current Medications/Supplements: *Starting toparimate today (1/24/18) with GUY Liu.     GOALS:  - Eat 3 meals/day met/contoinues -egg and bagel with fruit, or CIB for breakfast or dinner(when not hungry)  - Avoid snacking in between meals. If hungry, have non-starchy vegetables or fruit in between meals. No snacks recently during the day continues to struggle in the evening.  - Eat slowly (20-30 minutes per meal), chewing foods well (25 chews per bite) - continues  - Eliminate calorie-containing beverages (e.g. Crystal light, slices of fruit water) improving- more water now 48 oz, decreased juice(down to 2 glasses per day)  - 9\" Plate method (1/2 non-starchy vegetables/fruit, 1/4 lean protein, 1/4 whole grain starch - no more than 1 cup carb/meal) - reduced fried foods, including protein at meals    Nutrition History:  Cravings: salty snacks/chips      Initial Recall Diet:   B: skips   L: skips   D: salad with eggs/cheese/euceda bits/ranch & Japanese -or- ramen noodles   Snks: chips (1 bag/day)   Kinza: 30-60 oz juice per day, 20-40 oz sweetened green tea, recently stopped drinking soda.       Dining Out History:  Frequency: Once per week    Location: fast food vs sit-down restaurant   Types of Foods: burgers/fries, steak/chicken      EXERCISE:     1/16/2018 " "  How often do you exercise? 1 to 2 times per week   What is the duration of your exercise (in minutes)? 30 Minutes   What types of exercise do you do? walking   What keeps you from being more active?  I am as active as I can possbily be, Pain, Shortness of breath, Too tired      HABITS:     1/16/2018   How often do you drink alcohol? 2-4 times a month   If you do drink alcohol, how many drinks might you have in a day? (one drink = 5 oz. wine, 1 can/bottle of beer, 1 shot liquor) 1 or 2   Do you currently use any of the following Nicotine products? cigarettes   Have you ever used any of the following nicotine products? Cigarettes   If you previously used any of these products, what year did you quit? 2018   Have you or are you currently using street drugs or prescription strength medication for which you do not have a prescription for? No   Do you have a history of chemical dependency (alcohol or drug abuse)? No     Nutrition Diagnosis  Obesity r/t long history of self-monitoring deficit and excessive energy intake aeb BMI >30.    Intervention  Intervention Provided/Education Provided: reviewed previous goals and praised patient on progress with goals.  Patient stated that she is using carnation instant breakfast with almond milk for a meal when she is not hungry, dicussed meal replacement options, patient is interested in a pre made shake so she does not have to mix it herself. Patient reported that she has not had a cigarette in one month.  Provided pt with list of goals and RD contact information.      Patient Understanding: Good   Expected Compliance: Good     GOALS:  - Eat 3 meals/day   - Avoid snacking in between meals. If hungry, have non-starchy vegetables or fruit in between meals.   - Avoid eating before bedtime (by 8 PM)  - Eat slowly (20-30 minutes per meal), chewing foods well (25 chews per bite)  - Eliminate calorie-containing beverages (e.g. Crystal light, slices of fruit water)   - 9\" Plate method " (1/2 non-starchy vegetables/fruit, 1/4 lean protein, 1/4 whole grain starch - no more than 1 cup carb/meal)      *Protein Shake Criteria: no more than 250 Calories, at least 20 grams of protein, and less than 10 grams of sugar     Meal Replacement Shake Options:   [facility] Meal Replacement (250 Calories, 35 g protein)   Premier Protein (160 Calories, 30 g protein)  Slim Fast Advanced Nutrition (180 Calories, 20 g protein)  Muscle Milk, lactose-free, 17 oz bottle (210 Calories, 30 g protein)  Integrated Supplements, no artificial sugars (110 Calories, 20 g protein)  Quest Protein Bars (190 Calories, 20 g protein)  No Cow Protein Bar, gluten, dairy, and soy free (200 Calories, 20 g protein)      Follow-Up: 1 month or PRN    Time spent with patient: 30 minutes.  Susan Stephens RD, LD

## 2018-02-27 NOTE — MR AVS SNAPSHOT
"                  MRN:[id number]                      After Visit Summary   2/27/2018    Hilaria Bonner    MRN: [id number]           Visit Information        Provider Department      2/27/2018 10:00 AM Susan Stephens RD [facility]        Your next 10 appointments already scheduled     Mar 26, 2018  8:45 AM CDT   (Arrive by 8:30 AM)   Pre Bariatric Surgery with Joanne Sterling PA-C   [facility] ([facility])    [address] [place] [address]   [phone]            Jun 01, 2018 11:00 AM CDT   (Arrive by 10:45 AM)   Bariatric Surgery Evaluation Interview with Yessy Emerson, PhD   [facility] ([facility])    [address] [place] [address]   [phone]            Jun 01, 2018 12:00 PM CDT   (Arrive by 11:45 AM)   Bariatric Surgery Evaluation Testing with Yessy Emerson, PhD   [facility] ([facility])    [address] [place] [address]   [phone]              Care Instructions    GOALS:  - Eat 3 meals/day   - Avoid snacking in between meals. If hungry, have non-starchy vegetables or fruit in between meals.   - Avoid eating before bedtime (by 8 PM)  - Eat slowly (20-30 minutes per meal), chewing foods well (25 chews per bite)  - Eliminate calorie-containing beverages (e.g. Crystal light, slices of fruit water)   - 9\" Plate method (1/2 non-starchy vegetables/fruit, 1/4 lean protein, 1/4 whole grain starch - no more than 1 cup carb/meal)      *Protein Shake Criteria: no more than 250 Calories, at least 20 grams of protein, and less than 10 grams of sugar     Meal Replacement Shake Options:   [facility] Meal Replacement (250 Calories, 35 g protein)   Premier Protein (160 Calories, 30 g protein)  Slim Fast Advanced Nutrition (180 Calories, 20 g " protein)  Muscle Milk, lactose-free, 17 oz bottle (210 Calories, 30 g protein)  Integrated Supplements, no artificial sugars (110 Calories, 20 g protein)  Quest Protein Bars (190 Calories, 20 g protein)  No Cow Protein Bar, gluten, dairy, and soy free (200 Calories, 20 g protein)    Susan Stephens RD, LD    If you need to schedule or reschedule with a dietitian please call [phone].           [employer] Information     [employer] gives you secure access to your electronic health record. If you see a primary care provider, you can also send messages to your care team and make appointments. If you have questions, please call your primary care clinic.  If you do not have a primary care provider, please call [phone] and they will assist you.      [employer] is an electronic gateway that provides easy, online access to your medical records. With [employer], you can request a clinic appointment, read your test results, renew a prescription or communicate with your care team.     To access your existing account, please contact your [facility] or call [phone] for assistance.        Care EveryWhere ID     This is your Care EveryWhere ID. This could be used by other organizations to access your [place] medical records  [id number]        Equal Access to Services     JEREMÍAS LIGHT : Danielito Katz, cortney renae, shante watson, laura aparicio. So [facility] [phone].    ATENCIÓN: Si habla español, tiene a elizabeth disposición servicios gratuitos de asistencia lingüística. Llame al [phone].    We comply with applicable federal civil rights laws and [place] laws. We do not discriminate on the basis of race, color, national origin, age, disability, sex, sexual orientation, or gender identity.

## 2018-02-27 NOTE — PATIENT INSTRUCTIONS
"GOALS:  - Eat 3 meals/day   - Avoid snacking in between meals. If hungry, have non-starchy vegetables or fruit in between meals.   - Avoid eating before bedtime (by 8 PM)  - Eat slowly (20-30 minutes per meal), chewing foods well (25 chews per bite)  - Eliminate calorie-containing beverages (e.g. Crystal light, slices of fruit water)   - 9\" Plate method (1/2 non-starchy vegetables/fruit, 1/4 lean protein, 1/4 whole grain starch - no more than 1 cup carb/meal)      *Protein Shake Criteria: no more than 250 Calories, at least 20 grams of protein, and less than 10 grams of sugar     Meal Replacement Shake Options:   [facility] Meal Replacement (250 Calories, 35 g protein)   Premier Protein (160 Calories, 30 g protein)  Slim Fast Advanced Nutrition (180 Calories, 20 g protein)  Muscle Milk, lactose-free, 17 oz bottle (210 Calories, 30 g protein)  Integrated Supplements, no artificial sugars (110 Calories, 20 g protein)  Quest Protein Bars (190 Calories, 20 g protein)  No Cow Protein Bar, gluten, dairy, and soy free (200 Calories, 20 g protein)    Susan Stephens RD, LD    If you need to schedule or reschedule with a dietitian please call [phone].    "

## 2018-03-21 NOTE — PROGRESS NOTES
Visit Date:   2018      I was asked to see this patient for surgical options for hyperparathyroidism.  I was asked by Dr. Sterling to see this patient.  Greater than 40 minutes was spent in the care and consultation of this patient.        This is a 27-year-old female who is being evaluated for morbid obesity surgery by Dr. Sterling.  During that time the patient was noted to have an elevated parathyroid hormone level.  I should also note that the patient's calcium was 8.4, albumin was 3.3 and vitamin D was less than 5.  Her calculated for free vitamin, her total calcium with the albumin calculation is actually now 8.96.      The patient has no previous history of fractures, nephrolithiasis or osteoporosis.  There is no family history of thyroid or parathyroid disease.      PAST MEDICAL HISTORY, SURGICAL HISTORY, MEDICATIONS:  Have been reviewed and are available in the MR.      REVIEW OF SYSTEMS:  A 10-point review of systems is pertinent for that noted in the HPI.      PHYSICAL EXAMINATION:     NECK:  Soft.  There are no palpable masses present.      LABORATORY DATA:  Discussed above.      ASSESSMENT:  Secondary hyperparathyroidism.      PLAN:  I had a long discussion with the patient that indeed she has secondary hyperparathyroidism secondary to low vitamin D.  This is not a surgical issue.  This is indeed a medical issue and can be managed medically with a vitamin D supplementation.  I did encourage the patient to talk to her primary care physician about vitamin D supplementation and then have calcium, vitamin D and parathyroid hormone all have to be checked at the same time at 3-6 months after her vitamin D has been corrected.  I feel confident that the PTH will correct and she does not have primary hyperparathyroidism.         SHADI SEGURA MD             D: 2018   T: 2018   MT: MARY      Name:     RADHA JOHANSEN   MRN:      [id number]        Account:      [id number]   :       1990           Visit Date:   02/08/2018      Document: [id number]

## 2018-03-28 ENCOUNTER — ALLIED HEALTH/NURSE VISIT (OUTPATIENT)
Dept: SURGERY | Facility: CLINIC | Age: 28
End: 2018-03-28
Payer: COMMERCIAL

## 2018-03-28 ENCOUNTER — OFFICE VISIT (OUTPATIENT)
Dept: SURGERY | Facility: CLINIC | Age: 28
End: 2018-03-28
Payer: COMMERCIAL

## 2018-03-28 VITALS
TEMPERATURE: 98.2 F | DIASTOLIC BLOOD PRESSURE: 91 MMHG | HEIGHT: 66 IN | OXYGEN SATURATION: 95 % | WEIGHT: 293 LBS | SYSTOLIC BLOOD PRESSURE: 126 MMHG | HEART RATE: 83 BPM | BODY MASS INDEX: 47.09 KG/M2

## 2018-03-28 DIAGNOSIS — E66.01 MORBID OBESITY (H): ICD-10-CM

## 2018-03-28 DIAGNOSIS — Z71.6 ENCOUNTER FOR SMOKING CESSATION COUNSELING: ICD-10-CM

## 2018-03-28 DIAGNOSIS — Z72.0 TOBACCO ABUSE: ICD-10-CM

## 2018-03-28 DIAGNOSIS — E55.9 VITAMIN D DEFICIENCY: Primary | ICD-10-CM

## 2018-03-28 LAB
DEPRECATED CALCIDIOL+CALCIFEROL SERPL-MC: 15 UG/L (ref 20–75)
PTH-INTACT SERPL-MCNC: 147 PG/ML (ref 18–80)

## 2018-03-28 RX ORDER — TOPIRAMATE 25 MG/1
50 TABLET, FILM COATED ORAL 2 TIMES DAILY
Qty: 120 TABLET | Refills: 3 | Status: SHIPPED | OUTPATIENT
Start: 2018-03-28 | End: 2018-05-31

## 2018-03-28 NOTE — LETTER
3/28/2018       RE: Hilaria Bonner  [address]   [facility] [address]     Dear Colleague,    Thank you for referring your patient, Hilaria Bonner, to the [facility] SURGICAL WEIGHT MANAGEMENT at [facility]. Please see a copy of my visit note below.    Pre-Bariatric Surgery Note    Clinic, [facility]    Date: 3/28/2018     RE: Hilaria Bonner    MR#: [id number]   : 1990   Date of Visit: Mar 28, 2018    REASON FOR VISIT: Preoperative evaluation for possible weight loss surgery    Dear Dr. Cross, [facility],    I had the pleasure of seeing your patient, Hilaria Bonner, in my pre-operative bariatric clinic.    As you know, she is morbidly obese and considering weight loss surgery to treat obesity in association with her medical conditions of obesity.  Her consult weight was 315.  She has gained 5 pounds since her consult weight. She has not met her required pre-surgery weight.    She is tolerating topiramate but has gained 5 lbs.      Most recent weights:  Wt Readings from Last 4 Encounters:   18 320 lb 8 oz   18 (!) 317 lb 11.2 oz   18 (!) 317 lb   18 (!) 318 lb 1.6 oz       Please refer to initial consult note from date 18 for patient's weight history and co-morbidities.    ROS    Past Medical History:   Diagnosis Date     Earache or other ear, nose, or throat complaint 12/15    tyroid       Past Surgical History:   Procedure Laterality Date     GYN SURGERY      2 C-sections     KNEE SURGERY  most recently - [id number]    3 surgeries in [place]     ORTHOPEDIC SURGERY      2 knee meniscus surgery       Current Outpatient Prescriptions   Medication     vitamin D (ERGOCALCIFEROL) 20708 UNIT capsule     varenicline (CHANTIX STARTING MONTH ) 0.5 MG X 11 & 1 MG X 42 tablet     varenicline (CHANTIX) 1 MG tablet     topiramate (TOPAMAX) 25 MG tablet     Multiple Vitamins-Minerals (WOMENS MULTI VITAMIN & MINERAL PO)      "norethindrone-ethinyl estradiol (MICROGESTIN 1/20) 1-20 MG-MCG per tablet     No current facility-administered medications for this visit.        No Known Allergies    PHYSICAL EXAMINATION:  BP (!) 126/91  Pulse 83  Temp 98.2  F (36.8  C) (Oral)  Ht 5' 5.75\"  Wt 320 lb 8 oz  SpO2 95%  BMI 52.12 kg/m2   Body mass index is 52.12 kg/(m^2).   NAD NCAT  Respiratory: breathing unlabored  Abdomen: obese, soft/nt/nd      I emphasized exercise and activity behavior along with appropriate food choice as the main foundation for weight loss with surgery providing surgical reinforcement of the appropriate behavior set.    ASSESSMENT/PLAN:  27 y.o. Female here for pre bariatric surgery appt.  She is planning to have sleeve gastrectomy with Dr Lopez in June 2018.  Task list updated today.    Recheck PTH and Vitamin D now that patient is taking 5000 IU vitamin D daily.    Nicotine test today    Increase topiramate to 50 mg twice daily  Complete psych June 1st Yessy Emerson  See Dr Lopez after psych eval complete in June  Monthly dietitian visits for 6 months total  Need to lose 25 lbs now (20 lbs from consult)  Start meal replacement for breakfast and lunch      Review of general surgery weight loss process    1. Complete preoperative requirements, including weight loss.  Final weight check to confirm MANDATORY weight loss requirement must be documented on a clinic scale.    2. Discuss prior authorization with .    3. History and physical evaluation by PCP of PAC clinic within 30 days of surgery date, preoperative class, and weight check (weigh-in visit) to be scheduled by patient.  Pre-anesthesia clinic for risk evaluation to be scheduled by anesthesia clinic.    4. We cannot guarantee that patient will qualify for surgery unless all preoperative requirements are met, prior authorization from primary insurance company is granted, and insurance changes do not occur.    5. It is possible for patients to " regain all weight after weight loss surgery unless they follow guidelines prescribed by our bariatric center.    6. All patients with gastrointestinal complaints after weight loss surgery must have complaints conveyed to the bariatric team for appropriate treatment.    7. Vitamin deficiencies may develop post-bariatric surgery and annual laboratory testing should be performed.    8. Persistent nausea/vomiting after bariatric surgery entails risk of thiamine deficiency and should be treated early.  Vitamin B12 deficiency may develop, especially after gastric bypass surgery and must be recognized.        If you have any questions about our plans please don't hesitate to contact me.      I spent a total of 15 minutes face to face with Hilaria Bonner during today's office visit.  Over 50% of this time was spent counseling the patient and/or coordinating care.        Again, thank you for allowing me to participate in the care of your patient.      Sincerely,    Joanne Sterling PA-C

## 2018-03-28 NOTE — PROGRESS NOTES
"Pre-Bariatric Surgery Note    Clinic, [facility]    Date: 3/28/2018     RE: Hilaria Bonner    MR#: [id number]   : 1990   Date of Visit: Mar 28, 2018    REASON FOR VISIT: Preoperative evaluation for possible weight loss surgery    Dear Dr. Cross, [facility],    I had the pleasure of seeing your patient, Hilaria Bonner, in my pre-operative bariatric clinic.    As you know, she is morbidly obese and considering weight loss surgery to treat obesity in association with her medical conditions of obesity.  Her consult weight was 315.  She has gained 5 pounds since her consult weight. She has not met her required pre-surgery weight.    She is tolerating topiramate but has gained 5 lbs.      Most recent weights:  Wt Readings from Last 4 Encounters:   18 320 lb 8 oz   18 (!) 317 lb 11.2 oz   18 (!) 317 lb   18 (!) 318 lb 1.6 oz       Please refer to initial consult note from date 18 for patient's weight history and co-morbidities.    ROS    Past Medical History:   Diagnosis Date     Earache or other ear, nose, or throat complaint 12/15    tyroid       Past Surgical History:   Procedure Laterality Date     GYN SURGERY      2 C-sections     KNEE SURGERY  most recently - [phone]    3 surgeries in [place]     ORTHOPEDIC SURGERY      2 knee meniscus surgery       Current Outpatient Prescriptions   Medication     vitamin D (ERGOCALCIFEROL) 51639 UNIT capsule     varenicline (CHANTIX STARTING MONTH ) 0.5 MG X 11 & 1 MG X 42 tablet     varenicline (CHANTIX) 1 MG tablet     topiramate (TOPAMAX) 25 MG tablet     Multiple Vitamins-Minerals (WOMENS MULTI VITAMIN & MINERAL PO)     norethindrone-ethinyl estradiol (MICROGESTIN ) 1-20 MG-MCG per tablet     No current facility-administered medications for this visit.        No Known Allergies    PHYSICAL EXAMINATION:  BP (!) 126/91  Pulse 83  Temp 98.2  F (36.8  C) (Oral)  Ht 5' 5.75\"  Wt 320 lb 8 oz  SpO2 95%  BMI 52.12 " kg/m2   Body mass index is 52.12 kg/(m^2).   NAD NCAT  Respiratory: breathing unlabored  Abdomen: obese, soft/nt/nd      I emphasized exercise and activity behavior along with appropriate food choice as the main foundation for weight loss with surgery providing surgical reinforcement of the appropriate behavior set.    ASSESSMENT/PLAN:  27 y.o. Female here for pre bariatric surgery appt.  She is planning to have sleeve gastrectomy with Dr Lopez in June 2018.  Task list updated today.    Recheck PTH and Vitamin D now that patient is taking 5000 IU vitamin D daily.    Nicotine test today    Increase topiramate to 50 mg twice daily  Complete psych June 1st Yessy Emerson  See Dr Lopez after psych eval complete in June  Monthly dietitian visits for 6 months total  Need to lose 25 lbs now (20 lbs from consult)  Start meal replacement for breakfast and lunch      Review of general surgery weight loss process    1. Complete preoperative requirements, including weight loss.  Final weight check to confirm MANDATORY weight loss requirement must be documented on a clinic scale.    2. Discuss prior authorization with .    3. History and physical evaluation by PCP of PAC clinic within 30 days of surgery date, preoperative class, and weight check (weigh-in visit) to be scheduled by patient.  Pre-anesthesia clinic for risk evaluation to be scheduled by anesthesia clinic.    4. We cannot guarantee that patient will qualify for surgery unless all preoperative requirements are met, prior authorization from primary insurance company is granted, and insurance changes do not occur.    5. It is possible for patients to regain all weight after weight loss surgery unless they follow guidelines prescribed by our bariatric center.    6. All patients with gastrointestinal complaints after weight loss surgery must have complaints conveyed to the bariatric team for appropriate treatment.    7. Vitamin deficiencies may  develop post-bariatric surgery and annual laboratory testing should be performed.    8. Persistent nausea/vomiting after bariatric surgery entails risk of thiamine deficiency and should be treated early.  Vitamin B12 deficiency may develop, especially after gastric bypass surgery and must be recognized.        If you have any questions about our plans please don't hesitate to contact me.        Sincerely,    Joanne Sterling PA-C    I spent a total of 15 minutes face to face with Hilaria Bonner during today's office visit.  Over 50% of this time was spent counseling the patient and/or coordinating care.

## 2018-03-28 NOTE — PATIENT INSTRUCTIONS
"GOALS:  - Eat 3 meals/day   - Avoid snacking in between meals. If hungry, have non-starchy vegetables or fruit in between meals.   - Avoid eating before bedtime (by 8 PM)  - Eat slowly (20-30 minutes per meal), chewing foods well (25 chews per bite)  - Eliminate calorie-containing beverages (e.g. Crystal light, slices of fruit water)   - 9\" Plate method (1/2 non-starchy vegetables/fruit, 1/4 lean protein, 1/4 whole grain starch - no more than 1 cup carb/meal)  - Walking or biking 5-7 days per week, 1-2 miles(45 minutes)    Follow the Modified Liquid Diet for weight loss:  Breakfast: Protein Shake  Lunch: Protein Shake  Supper: 3 oz lean protein + non-starchy vegetables  Snack: non-starchy vegetables (no calorie-containing dips/condiments)  Beverages: at least 48-64 oz water between meals daily    *Protein Shake Criteria: no more than 250 Calories, at least 20 grams of protein, and less than 10 grams of sugar     Meal Replacement Shake Options:   [facility] Meal Replacement (250 Calories, 35 g protein)   Premier Protein (160 Calories, 30 g protein)  Slim Fast Advanced Nutrition (180 Calories, 20 g protein)  Muscle Milk, lactose-free, 17 oz bottle (210 Calories, 30 g protein)  Integrated Supplements, no artificial sugars (110 Calories, 20 g protein)  Quest Protein Bars (190 Calories, 20 g protein)  No Cow Protein Bar, gluten, dairy, and soy free (200 Calories, 20 g protein)    Frozen Meal Replacements  Healthy Choice  Lean Cuisine  Atkins Meals  Smart Ones    Susan Stephens RD, LD  Follow up in one month  If you need to schedule or reschedule with a dietitian please call [phone].    "

## 2018-03-28 NOTE — MR AVS SNAPSHOT
"                  MRN:[id number]                      After Visit Summary   3/28/2018    Hilaria Bonner    MRN: [id number]           Visit Information        Provider Department      3/28/2018 10:00 AM Susan Stephens RD [facility] Surgical Weight Management        Your next 10 appointments already scheduled     Jun 01, 2018 11:00 AM CDT   (Arrive by 10:45 AM)   Bariatric Surgery Evaluation Interview with Yessy Emerson, PhD   [facility] Gastroenterology and IBD Clinic ([facility])    [address] [place] [id number]   [phone]            Jun 01, 2018 12:00 PM CDT   (Arrive by 11:45 AM)   Bariatric Surgery Evaluation Testing with Yessy Emerson, PhD   [facility] Gastroenterology [facility] IBD Clinic ([facility])    [address] [place] [address]   [phone]              Care Instructions    GOALS:  - Eat 3 meals/day   - Avoid snacking in between meals. If hungry, have non-starchy vegetables or fruit in between meals.   - Avoid eating before bedtime (by 8 PM)  - Eat slowly (20-30 minutes per meal), chewing foods well (25 chews per bite)  - Eliminate calorie-containing beverages (e.g. Crystal light, slices of fruit water)   - 9\" Plate method (1/2 non-starchy vegetables/fruit, 1/4 lean protein, 1/4 whole grain starch - no more than 1 cup carb/meal)  - Walking or biking 5-7 days per week, 1-2 miles(45 minutes)    Follow the Modified Liquid Diet for weight loss:  Breakfast: Protein Shake  Lunch: Protein Shake  Supper: 3 oz lean protein + non-starchy vegetables  Snack: non-starchy vegetables (no calorie-containing dips/condiments)  Beverages: at least 48-64 oz water between meals daily    *Protein Shake Criteria: no more than 250 Calories, at least 20 grams of protein, and less than 10 grams of sugar     Meal Replacement Shake Options:   [facility] Meal Replacement (250 Calories, 35 g protein)   Premier Protein (160 " Calories, 30 g protein)  Slim Fast Advanced Nutrition (180 Calories, 20 g protein)  Muscle Milk, lactose-free, 17 oz bottle (210 Calories, 30 g protein)  Integrated Supplements, no artificial sugars (110 Calories, 20 g protein)  Quest Protein Bars (190 Calories, 20 g protein)  No Cow Protein Bar, gluten, dairy, and soy free (200 Calories, 20 g protein)    Frozen Meal Replacements  Healthy Choice  Lean Cuisine  Atkins Meals  Smart Ones    Susan Stephens, RD, LD  Follow up in one month  If you need to schedule or reschedule with a dietitian please call [phone].           Viropro Information     Viropro gives you secure access to your electronic health record. If you see a primary care provider, you can also send messages to your care team and make appointments. If you have questions, please call your primary care clinic.  If you do not have a primary care provider, please call [phone] and they will assist you.      Viropro is an electronic gateway that provides easy, online access to your medical records. With Viropro, you can request a clinic appointment, read your test results, renew a prescription or communicate with your care team.     To access your existing account, please contact your [facility] or call [phone] for assistance.        Care EveryWhere ID     This is your Care EveryWhere ID. This could be used by other organizations to access your [place] medical records  [id number]        Equal Access to Services     JEREMÍAS LIGHT AH: Danielito Katz, waaxda lusteven, qaybta kaalluara cobb. So [facility] [phone].    ATENCIÓN: Si habla español, tiene a elizabeth disposición servicios gratuitos de asistencia lingüística. Llame al [phone].    We comply with applicable federal civil rights laws and [place] laws. We do not discriminate on the basis of race, color, national origin, age, disability, sex, sexual  orientation, or gender identity.

## 2018-03-28 NOTE — NURSING NOTE
"(   Chief Complaint   Patient presents with     RECHECK     PBS    )    ( Weight: 320 lb 8 oz )  ( Height: 5' 5.75\" )  ( BMI (Calculated): 52.23 )  ( Initial Weight: 315 lb 6.4 oz )  ( Cumulative weight loss (lbs): -5.1 )  (   )  (   )  (   )  (   )    ( BP: (!) 126/91 )  (   )  ( Temp: 98.2  F (36.8  C) )  ( Temp src: Oral )  ( Pulse: 83 )  (   )  ( SpO2: 95 % )    (   Patient Active Problem List   Diagnosis     Morbid obesity (H)     Vitamin D deficiency     Elevated parathyroid hormone     Encounter for smoking cessation counseling     Menorrhagia with irregular cycle    )  (   Current Outpatient Prescriptions   Medication Sig Dispense Refill     vitamin D (ERGOCALCIFEROL) 06140 UNIT capsule Take 1 capsule (50,000 Units) by mouth once a week for 12 doses 12 capsule 0     varenicline (CHANTIX STARTING MONTH AMADO) 0.5 MG X 11 & 1 MG X 42 tablet Take 0.5 mg tab daily for 3 days, then 0.5 mg tab twice daily for 4 days, then 1 mg twice daily. 53 tablet 0     varenicline (CHANTIX) 1 MG tablet Take 1 tablet (1 mg) by mouth 2 times daily 56 tablet 2     topiramate (TOPAMAX) 25 MG tablet 25mg at bedtime for week 1, 50mg at bedtime for 1 week, and 75mg at bedtime thereafter 90 tablet 1     Multiple Vitamins-Minerals (WOMENS MULTI VITAMIN & MINERAL PO)        norethindrone-ethinyl estradiol (MICROGESTIN 1/20) 1-20 MG-MCG per tablet Take 1 tablet by mouth daily 63 tablet 4    )  ( Diabetes Eval:    )    ( Pain Eval:  Data Unavailable )    ( Wound Eval:       )    (   History   Smoking Status     Former Smoker     Years: 1.00     Start date: 11/20/2016     Quit date: 1/12/2018   Smokeless Tobacco     Former User     Quit date: 1/12/2018    )    ( Signed By:  Rosa Lopez; March 28, 2018; 9:37 AM )    "

## 2018-03-28 NOTE — MR AVS SNAPSHOT
After Visit Summary   3/28/2018    Hilaria Bonner    MRN: [id number]           Patient Information     Date Of Birth          1990        Visit Information        Provider Department      3/28/2018 10:30 AM Joanne Sterling PA-C [facility] Surgical Weight Management        Today's Diagnoses     Vitamin D deficiency    -  1    Morbid obesity (H)          Care Instructions    Increase topiramate to 50 mg twice daily    See Joanne in May for follow up on topiramate    Complete psych June 1st Yessy Emerson    See Dr Lopez after psych eval complete in June    Monthly dietitian visits for 6 months total    Need to lose 25 lbs now (20 lbs from consult)    Start meal replacement for breakfast and lunch          Follow-ups after your visit        Your next 10 appointments already scheduled     Mar 28, 2018 10:30 AM CDT   (Arrive by 10:15 AM)   Pre Bariatric Surgery with KATTY Cheung [facility] Surgical Weight Management ([facility])    [address] [place] [id number] [phone]            Jun 01, 2018 11:00 AM CDT   (Arrive by 10:45 AM)   Bariatric Surgery Evaluation Interview with Yessy Emerson, PhD   [facility] Gastroenterology and IBD Clinic ([facility])    [address] [place] [id number] [phone]            Jun 01, 2018 12:00 PM CDT   (Arrive by 11:45 AM)   Bariatric Surgery Evaluation Testing with Yessy Emerson, PhD   [facility] Gastroenterology and IBD Clinic ([facility])    [address] [place] [id number] [phone]              Who to contact     Please call your clinic at [phone] to:    Ask questions about your health    Make or cancel appointments    Discuss your medicines    Learn about your test results    Speak to your doctor            Additional Information About Your Visit        Ferdinand  "Information     CloudD[employer] gives you secure access to your electronic health record. If you see a primary care provider, you can also send messages to your care team and make appointments. If you have questions, please call your primary care clinic.  If you do not have a primary care provider, please call [phone] and they will assist you.      CloudDock is an electronic gateway that provides easy, online access to your medical records. With [employer], you can request a clinic appointment, read your test results, renew a prescription or communicate with your care team.     To access your existing account, please contact your [facility] or call [phone] for assistance.        Care EveryWhere ID     This is your Care EveryWhere ID. This could be used by other organizations to access your [facility] medical records  [id number]        Your Vitals Were     Pulse Temperature Height Pulse Oximetry BMI (Body Mass Index)       83 98.2  F (36.8  C) (Oral) 5' 5.75\" 95% 52.12 kg/m2        Blood Pressure from Last 3 Encounters:   03/28/18 (!) 126/91   02/22/18 138/90   02/08/18 137/83    Weight from Last 3 Encounters:   03/28/18 320 lb 8 oz   02/27/18 (!) 317 lb 11.2 oz   02/22/18 (!) 317 lb              We Performed the Following     Parathyroid Hormone Intact     Vitamin D Deficiency          Today's Medication Changes          These changes are accurate as of 3/28/18 10:13 AM.  If you have any questions, ask your nurse or doctor.               These medicines have changed or have updated prescriptions.        Dose/Directions    topiramate 25 MG tablet   Commonly known as:  TOPAMAX   This may have changed:    - how much to take  - how to take this  - when to take this  - additional instructions   Used for:  Morbid obesity (H)   Changed by:  Joanne Sterling PA-C        Dose:  50 mg   Take 2 tablets (50 mg) by mouth 2 times daily   Quantity:  120 tablet   Refills:  3            Where to " get your medicines      These medications were sent to [facility] [address] [place] [address] [address] AT [place] [address] [address], [facility] [address]     Phone:  [phone]     topiramate 25 MG tablet                Primary Care Provider Office Phone # Fax #    [facility] [facility] [phone] [address] [facility] [address]        Equal Access to Services     JEREMÍAS LIGHT : Hadii aad ku hadasho Soomaali, waaxda luqadaha, qaybta kaalmada adeegyada, waxay idiin hayaan adeeg kharash la'gary aparicio. So [facility] [phone].    ATENCIÓN: Si habla español, tiene a elizabeth disposición servicios gratuitos de asistencia lingüística. [facility] [phone].    We comply with applicable federal civil rights laws and [place] laws. We do not discriminate on the basis of race, color, national origin, age, disability, sex, sexual orientation, or gender identity.            Thank you!     Thank you for choosing [facility] SURGICAL WEIGHT MANAGEMENT  for your care. Our goal is always to provide you with excellent care. Hearing back from our patients is one way we can continue to improve our services. Please take a few minutes to complete the written survey that you may receive in the mail after your visit with us. Thank you!             Your Updated Medication List - Protect others around you: Learn how to safely use, store and throw away your medicines at www.disposemymeds.org.          This list is accurate as of 3/28/18 10:13 AM.  Always use your most recent med list.                   Brand Name Dispense Instructions for use Diagnosis    norethindrone-ethinyl estradiol 1-20 MG-MCG per tablet    MICROGESTIN 1/20    63 tablet    Take 1 tablet by mouth daily    Irregular menstrual cycle       topiramate 25 MG tablet    TOPAMAX    120 tablet    Take 2 tablets (50 mg) by mouth 2 times daily    Morbid obesity (H)       * varenicline 0.5 MG X 11 & 1 MG X 42  tablet    CHANTIX STARTING MONTH AMADO    53 tablet    Take 0.5 mg tab daily for 3 days, then 0.5 mg tab twice daily for 4 days, then 1 mg twice daily.    Encounter for smoking cessation counseling       * varenicline 1 MG tablet    CHANTIX    56 tablet    Take 1 tablet (1 mg) by mouth 2 times daily    Encounter for smoking cessation counseling       vitamin D 96897 UNIT capsule    ERGOCALCIFEROL    12 capsule    Take 1 capsule (50,000 Units) by mouth once a week for 12 doses    Vitamin D deficiency       WOMENS MULTI VITAMIN & MINERAL PO           * Notice:  This list has 2 medication(s) that are the same as other medications prescribed for you. Read the directions carefully, and ask your doctor or other care provider to review them with you.

## 2018-03-28 NOTE — PROGRESS NOTES
"Nutrition Assessment  Reason For Visit: Nutrition Assessment bariatric surgery    Hilaria Bonner is a 27 year old presenting today for bariatric nutrition consult. Pt is interested in laparoscopic sleeve gastrectomy with Dr. Lopez expected surgery in July 2018. This is pt's 3rd of 6 required nutrition visits prior to surgery. Pt referred by JEAN Sterling PA-C.     Reviewed task list    Anthropometrics:  Height: 5' 5.75\"  Initial weight: 315.4 lbs   BMI: 51.3 kg/m^2  Current weight: 320.5 lbs (+2.8 lbs in the past month, +5.1 lbs since initial)     Required weight loss goal pre-op: -20.4 lbs from initial weight; goal weight of 295 lbs.     Current Medications/Supplements: *Starting toparimate today (1/24/18) with GUY Liu.   Vitamin D 50,000 IU once per week    Recent food recall:  Breakfast- shake  L- shake or salad  Fish, tuna, steak and brussel sprouts  Fruit in water    Progress with Previous Goals:  - Eat 3 meals/day - met/continues  - Avoid snacking in between meals. If hungry, have non-starchy vegetables or fruit in between meals. - carrots or pro bar(from [employer] 17 gm protein)  - Avoid eating before bedtime (by 8 PM) met unless doing a double 2-3 times per week  - Eat slowly (20-30 minutes per meal), chewing foods well (25 chews per bite) - met/continues  - Eliminate calorie-containing beverages (e.g. Crystal light, slices of fruit water) - calories free beverages  - 9\" Plate method (1/2 non-starchy vegetables/fruit, 1/4 lean protein, 1/4 whole grain starch - no more than 1 cup carb/meal) - met/continues    Nutrition Diagnosis  Obesity r/t long history of self-monitoring deficit and excessive energy intake aeb BMI >30.    Intervention  Intervention Provided/Education Provided: reviewed previous goals and praised patient on progress with goals.  Patient reported using protein shakes at breakfast but continues to struggle with weight loss, discussed using the modified liquid diet.  Patient reported wanting " "to start exercising now that the weather is improving.  Provided pt with list of goals and RD contact information.      Patient Understanding: Good   Expected Compliance: Good     GOALS:  - Eat 3 meals/day   - Avoid snacking in between meals. If hungry, have non-starchy vegetables or fruit in between meals.   - Avoid eating before bedtime (by 8 PM)  - Eat slowly (20-30 minutes per meal), chewing foods well (25 chews per bite)  - Eliminate calorie-containing beverages (e.g. Crystal light, slices of fruit water)   - 9\" Plate method (1/2 non-starchy vegetables/fruit, 1/4 lean protein, 1/4 whole grain starch - no more than 1 cup carb/meal)  - Walking or biking 5-7 days per week, 1-2 miles(45 minutes)    Follow the Modified Liquid Diet for weight loss:  Breakfast: Protein Shake  Lunch: Protein Shake  Supper: 3 oz lean protein + non-starchy vegetables  Snack: non-starchy vegetables (no calorie-containing dips/condiments)  Beverages: at least 48-64 oz water between meals daily    *Protein Shake Criteria: no more than 250 Calories, at least 20 grams of protein, and less than 10 grams of sugar     Meal Replacement Shake Options:   M Health Meal Replacement (250 Calories, 35 g protein)   Premier Protein (160 Calories, 30 g protein)  Slim Fast Advanced Nutrition (180 Calories, 20 g protein)  Muscle Milk, lactose-free, 17 oz bottle (210 Calories, 30 g protein)  Integrated Supplements, no artificial sugars (110 Calories, 20 g protein)  Quest Protein Bars (190 Calories, 20 g protein)  No Cow Protein Bar, gluten, dairy, and soy free (200 Calories, 20 g protein)    Frozen Meal Replacements  Healthy Choice  Lean Cuisine  Atkins Meals  Smart Ones      Follow-Up: 1 month or PRN    Time spent with patient: 30 minutes.  Susan Stephens, RD, LD   "

## 2018-03-28 NOTE — TELEPHONE ENCOUNTER
"Requested Prescriptions   Pending Prescriptions Disp Refills     CHANTIX STARTING MONTH AMADO 0.5 MG X 11 & 1 MG X 42 tablet [Pharmacy Med Name: CHANTIX STARTING MONTH AMADO 53'S]    Last Written Prescription Date:  1/25/18  Last Fill Quantity: 53,  # refills: 0   Last Office Visit with [facility] [place] or [facility] prescribing provider:  2/22/18   Future Office Visit:      53 tablet 0     Sig: TAKE AS DIRECTED PER PACKAGE DIRECTIONS    Partial Cholinergic Nicotinic Agonist Agents Failed    3/28/2018 12:39 PM       Failed - Blood pressure under 140/90 in past 12 months    BP Readings from Last 3 Encounters:   03/28/18 (!) 126/91   02/22/18 138/90   02/08/18 137/83                Passed - Recent (12 mo) or future (30 days) visit within the authorizing provider's specialty    Patient had office visit in the last 12 months or has a visit in the next 30 days with authorizing provider or within the authorizing provider's specialty.  See \"Patient Info\" tab in inbasket, or \"Choose Columns\" in Meds & Orders section of the refill encounter.           Passed - Patient is 18 years of age or older       Passed - Patient is not pregnant       Passed - No positive pregnancy test on file in past 12 months              Kj Faarax  [facility]  "

## 2018-03-28 NOTE — LETTER
April 9, 2018      TO: Hilaria Bonner  [address]   [facility] [address]         Dear Ms. Hliaria Bonner,    We received and reviewed your test results done on 03/28/2018.  You may receive more than one letter if we receive the results on multiple days.  Please share all lab and test results with your primary care provider and keep a copy for your own records.        Your test results are normal except for the following addressed below:    Your Vitamin D level is low:   -Please start 2,000 IU's of Vitamin D3 daily    -Please recheck your Vitamin D level in 2 months with your primary care provider    Your PTH is elevated:   -Please take your Vitamin D supplement as directed and recheck your PTH and Vitamin D level in  2 months.     If you have any questions, feel free contact us at the Call Center [phone].      Resulted Orders   Parathyroid Hormone Intact   Result Value Ref Range    Parathyroid Hormone Intact 147 (H) 18 - 80 pg/mL   Vitamin D Deficiency   Result Value Ref Range    Vitamin D Deficiency screening 15 (L) 20 - 75 ug/L      Comment:      Season, race, dietary intake, and treatment affect the concentration of   25-hydroxy-Vitamin D. Values may decrease during winter months and increase   during summer months. Values 20-29 ug/L may indicate Vitamin D insufficiency   and values <20 ug/L may indicate Vitamin D deficiency.  Vitamin D determination is routinely performed by an immunoassay specific for   25 hydroxyvitamin D3.  If an individual is on vitamin D2 (ergocalciferol)   supplementation, please specify 25 OH vitamin D2 and D3 level determination by   LCMSMS test [id number].             Sincerely,      Joanne Sterling

## 2018-03-28 NOTE — PATIENT INSTRUCTIONS
Increase topiramate to 50 mg twice daily    See Joanne in May for follow up on topiramate    Complete psych June 1st Yessy Emerson    See Dr Lopez after psych eval complete in June    Monthly dietitian visits for 6 months total    Need to lose 25 lbs now (20 lbs from consult)    Start meal replacement for breakfast and lunch

## 2018-03-29 NOTE — TELEPHONE ENCOUNTER
Routing refill request to provider for review/approval because:  Protocol failed.  Summer Terrazas RN

## 2018-03-30 LAB — COTININE UR QL: NEGATIVE NG/ML

## 2018-04-11 ENCOUNTER — MYC MEDICAL ADVICE (OUTPATIENT)
Dept: FAMILY MEDICINE | Facility: CLINIC | Age: 28
End: 2018-04-11

## 2018-04-25 ENCOUNTER — ALLIED HEALTH/NURSE VISIT (OUTPATIENT)
Dept: SURGERY | Facility: CLINIC | Age: 28
End: 2018-04-25
Payer: COMMERCIAL

## 2018-04-25 VITALS — WEIGHT: 293 LBS | BODY MASS INDEX: 50.68 KG/M2

## 2018-04-25 NOTE — PROGRESS NOTES
"Nutrition Assessment  Reason For Visit: Nutrition Assessment bariatric surgery    Hilaria Bonner is a 27 year old presenting today for bariatric nutrition consult. Pt is interested in laparoscopic sleeve gastrectomy with Dr. Lopez expected surgery in July 2018. This is pt's 4th of 6 required nutrition visits prior to surgery. Pt referred by JEAN Sterling PA-C.     Anthropometrics:  Height: 5' 5.75\"  Initial weight: 315.4 lbs   BMI: 51.3 kg/m^2  Current weight: 311.6 lbs (-8.9 lbs in the past month, -3.6 lbs since initial)     Required weight loss goal pre-op: -20.4 lbs from initial weight; goal weight of 295 lbs.     Current Medications/Supplements: *Starting toparimate today (1/24/18) with GUY Liu.   Vitamin D 50,000 IU once per week      Progress with Previous Goals:  - Eat 3 meals/day - met/continues  - Avoid snacking in between meals. If hungry, have non-starchy vegetables or fruit in between meals. -met/continues  - Avoid eating before bedtime (by 8 PM) - met/continues  - Eat slowly (20-30 minutes per meal), chewing foods well (25 chews per bite) - met/continues  - Eliminate calorie-containing beverages (e.g. Crystal light, slices of fruit water) - water and diet tea  - 9\" Plate method (1/2 non-starchy vegetables/fruit, 1/4 lean protein, 1/4 whole grain starch - no more than 1 cup carb/meal) - frozen meals  - Walking or biking 5-7 days per week, 1-2 miles(45 minutes)- haven't started yet, busy at work and not feeling well recently - walking at work  - Follow the Modified Liquid Diet for weight loss- met slim fast advanced nutrition and M health meal replacement    Nutrition Diagnosis  Obesity r/t long history of self-monitoring deficit and excessive energy intake aeb BMI >30.    Intervention  Intervention Provided/Education Provided: reviewed previous goals and praised patient on progress with goals.  Patient reported she has been following the modified liquid diet over he past month and not snacking " "between meals.  Patient has not yet started regular physical activity but hopes to this coming month.  Provided pt with list of goals and RD contact information.      Patient Understanding: Good   Expected Compliance: Good     GOALS:  - Eat 3 meals/day   - Avoid snacking in between meals. If hungry, have non-starchy vegetables or fruit in between meals.   - Avoid eating before bedtime (by 8 PM)  - Eat slowly (20-30 minutes per meal), chewing foods well (25 chews per bite)  - Eliminate calorie-containing beverages (e.g. Crystal light, slices of fruit water)   - 9\" Plate method (1/2 non-starchy vegetables/fruit, 1/4 lean protein, 1/4 whole grain starch - no more than 1 cup carb/meal)  - Walking or biking 5-7 days per week, 1-2 miles(45 minutes)  - Separate beverages form meals, no drinking 30 minutes before, during or after meals      Follow-Up: 1 month or PRN    Time spent with patient: 15 minutes.  Susan Stephens, RD, LD   "

## 2018-04-25 NOTE — PATIENT INSTRUCTIONS
"GOALS:  - Eat 3 meals/day   - Avoid snacking in between meals. If hungry, have non-starchy vegetables or fruit in between meals.   - Avoid eating before bedtime (by 8 PM)  - Eat slowly (20-30 minutes per meal), chewing foods well (25 chews per bite)  - Eliminate calorie-containing beverages (e.g. Crystal light, slices of fruit water)   - 9\" Plate method (1/2 non-starchy vegetables/fruit, 1/4 lean protein, 1/4 whole grain starch - no more than 1 cup carb/meal)  - Walking or biking 5-7 days per week, 1-2 miles(45 minutes)  - Separate beverages form meals, no drinking 30 minutes before, during or after meals  "

## 2018-04-27 ENCOUNTER — OFFICE VISIT (OUTPATIENT)
Dept: FAMILY MEDICINE | Facility: CLINIC | Age: 28
End: 2018-04-27
Payer: COMMERCIAL

## 2018-04-27 VITALS
BODY MASS INDEX: 47.09 KG/M2 | TEMPERATURE: 98.2 F | HEART RATE: 91 BPM | WEIGHT: 293 LBS | RESPIRATION RATE: 26 BRPM | SYSTOLIC BLOOD PRESSURE: 136 MMHG | HEIGHT: 66 IN | DIASTOLIC BLOOD PRESSURE: 94 MMHG | OXYGEN SATURATION: 99 %

## 2018-04-27 DIAGNOSIS — R11.10 VOMITING AND DIARRHEA: Primary | ICD-10-CM

## 2018-04-27 DIAGNOSIS — R19.7 VOMITING AND DIARRHEA: Primary | ICD-10-CM

## 2018-04-27 PROCEDURE — 99213 OFFICE O/P EST LOW 20 MIN: CPT | Performed by: PHYSICIAN ASSISTANT

## 2018-04-27 RX ORDER — ONDANSETRON 4 MG/1
4 TABLET, ORALLY DISINTEGRATING ORAL ONCE
Qty: 1 TABLET | Refills: 0
Start: 2018-04-27 | End: 2018-04-27

## 2018-04-27 RX ORDER — ONDANSETRON 4 MG/1
4-8 TABLET, ORALLY DISINTEGRATING ORAL EVERY 6 HOURS PRN
Qty: 20 TABLET | Refills: 1 | Status: SHIPPED | OUTPATIENT
Start: 2018-04-27 | End: 2019-03-07

## 2018-04-27 ASSESSMENT — PAIN SCALES - GENERAL: PAINLEVEL: SEVERE PAIN (7)

## 2018-04-27 NOTE — MR AVS SNAPSHOT
After Visit Summary   4/27/2018    Hilaria Bonner    MRN: [id number]           Patient Information     Date Of Birth          1990        Visit Information        Provider Department      4/27/2018 10:00 AM Terri Cody PA-C [facility]        Today's Diagnoses     Vomiting and diarrhea    -  1       Follow-ups after your visit        Your next 10 appointments already scheduled     May 31, 2018 11:30 AM CDT   NUTRITION VISIT with Lola Castellano RD   [facility] ([facility])    [address] [place] [address]   [phone]            May 31, 2018 12:30 PM CDT   (Arrive by 12:15 PM)   Pre Bariatric Surgery with Joanne Sterling PA-C   [facility] ([facility])    [address] [place] [address]   [phone]            Jun 01, 2018 11:00 AM CDT   (Arrive by 10:45 AM)   Bariatric Surgery Evaluation Interview with Yessy Emerson, PhD   [facility] ([facility])    [address] [place] [address]   [phone]            Jun 01, 2018 12:00 PM CDT   (Arrive by 11:45 AM)   Bariatric Surgery Evaluation Testing with Yessy Emerson, PhD   [facility] ([facility])    [address] [place] [address]   [phone]            Jun 07, 2018 10:20 AM CDT   (Arrive by 10:05 AM)   Pre Bariatric Surgery with Luan Lopez MD   [facility] ([facility])    [address] [place] [address]   [phone]            Jun 28, 2018 10:00 AM CDT   (Arrive by 9:45 AM)   NUTRITION VISIT with Susan Stephens RD   [facility] ([facility])     "[address] [address] [phone]              Who to contact     If you have questions or need follow up information about today's clinic visit or your schedule please contact [facility] directly at [phone].  Normal or non-critical lab and imaging results will be communicated to you by MyChart, letter or phone within 4 business days after the clinic has received the results. If you do not hear from us within 7 days, please contact the clinic through [employer] or phone. If you have a critical or abnormal lab result, we will notify you by phone as soon as possible.  Submit refill requests through [employer] or call your pharmacy and they will forward the refill request to us. Please allow 3 business days for your refill to be completed.          Additional Information About Your Visit        MyChart Information     [employer] gives you secure access to your electronic health record. If you see a primary care provider, you can also send messages to your care team and make appointments. If you have questions, please call your primary care clinic.  If you do not have a primary care provider, please call [phone] and they will assist you.        Care EveryWhere ID     This is your Care EveryWhere ID. This could be used by other organizations to access your [facility] medical records  [id number]        Your Vitals Were     Pulse Temperature Respirations Height Last Period Pulse Oximetry    91 98.2  F (36.8  C) (Oral) 26 1.67 m (5' 5.75\") 04/16/2018 (Approximate) 99%    BMI (Body Mass Index)                   50.64 kg/m2            Blood Pressure from Last 3 Encounters:   04/27/18 (!) 136/94   03/28/18 (!) 126/91   02/22/18 138/90    Weight from Last 3 Encounters:   04/27/18 141.3 kg (311 lb 6.4 oz)   04/25/18 141.3 kg (311 lb 9.6 oz)   03/28/18 145.4 kg (320 lb 8 oz)              Today, you had the following     No orders found for display         Today's " Medication Changes          These changes are accurate as of 4/27/18 11:59 PM.  If you have any questions, ask your nurse or doctor.               Start taking these medicines.        Dose/Directions    * ondansetron 4 MG ODT tab   Commonly known as:  ZOFRAN ODT   Used for:  Vomiting and diarrhea   Started by:  Terri Cody PA-C        Dose:  4-8 mg   Take 1-2 tablets (4-8 mg) by mouth every 6 hours as needed for nausea   Quantity:  20 tablet   Refills:  1       * ondansetron 4 MG ODT tab   Commonly known as:  ZOFRAN ODT   Used for:  Vomiting and diarrhea   Started by:  Terri Cody PA-C        Dose:  4 mg   Take 1 tablet (4 mg) by mouth once for 1 dose   Quantity:  1 tablet   Refills:  0       * Notice:  This list has 2 medication(s) that are the same as other medications prescribed for you. Read the directions carefully, and ask your doctor or other care provider to review them with you.         Where to get your medicines      [facility] [address] [place] [address] [place]  AT [facility] [facility] [address]     Phone:  [phone]     ondansetron 4 MG ODT tab         Some of these will need a paper prescription and others can be bought over the counter.  Ask your nurse if you have questions.     You don't need a prescription for these medications     ondansetron 4 MG ODT tab                Primary Care Provider Office Phone # Fax #    [facility] [phone]       [address] [facility] [address]        Equal Access to Services     LYNN LIGHT AH: Hadii dario tadeo Soángela, waaxda luqadaha, qaybta kaalmada shirley, laura aparicio. So [facility] [phone].    ATENCIÓN: Si habla español, tiene a elizabeth disposición servicios gratuitos de asistencia lingüística. Llame al [phone].    We comply with applicable federal  civil rights laws and [place] laws. We do not discriminate on the basis of race, color, national origin, age, disability, sex, sexual orientation, or gender identity.            Thank you!     Thank you for choosing [facility]  for your care. Our goal is always to provide you with excellent care. Hearing back from our patients is one way we can continue to improve our services. Please take a few minutes to complete the written survey that you may receive in the mail after your visit with us. Thank you!             Your Updated Medication List - Protect others around you: Learn how to safely use, store and throw away your medicines at www.disposemymeds.org.          This list is accurate as of 4/27/18 11:59 PM.  Always use your most recent med list.                   Brand Name Dispense Instructions for use Diagnosis    norethindrone-ethinyl estradiol 1-20 MG-MCG per tablet    MICROGESTIN 1/20    63 tablet    Take 1 tablet by mouth daily    Irregular menstrual cycle       * ondansetron 4 MG ODT tab    ZOFRAN ODT    20 tablet    Take 1-2 tablets (4-8 mg) by mouth every 6 hours as needed for nausea    Vomiting and diarrhea       * ondansetron 4 MG ODT tab    ZOFRAN ODT    1 tablet    Take 1 tablet (4 mg) by mouth once for 1 dose    Vomiting and diarrhea       topiramate 25 MG tablet    TOPAMAX    120 tablet    Take 2 tablets (50 mg) by mouth 2 times daily    Morbid obesity (H)       varenicline 1 MG tablet    CHANTIX    56 tablet    Take 1 tablet (1 mg) by mouth 2 times daily    Encounter for smoking cessation counseling       WOMENS MULTI VITAMIN & MINERAL PO           * Notice:  This list has 2 medication(s) that are the same as other medications prescribed for you. Read the directions carefully, and ask your doctor or other care provider to review them with you.

## 2018-04-27 NOTE — LETTER
[address] [address]  Phone: [phone]    April 27, 2018        Hilaria Bonner  [address]   [facility] [address]          To whom it may concern:    RE: Hilaria Bonner    Patient was seen and treated today at our clinic and missed work 4/26/18-4/29/18 due to an illness    Please contact me for questions or concerns.      Sincerely,        Denise Cody PAC

## 2018-04-27 NOTE — NURSING NOTE
The following medication was given:     MEDICATION: Ondansetron Orally Disintegrating Tablets 4mg  ROUTE: PO  SITE: mouth  DOSE: 4mg   LOT #: [id number]  :  [employer]  EXPIRATION DATE:  06/2020  NDC#: 65862-390-10     [facility]

## 2018-04-27 NOTE — PROGRESS NOTES
SUBJECTIVE:   Hilaria Bonner is a 27 year old female who presents to clinic today for the following health issues:    Diarrhea and vomiting  Onset: Wednesday night    Description:   Consistency of stool: watery and yellow  Blood in stool: no   Number of loose stools in past 24 hours: 5-6    Progression of Symptoms:  worsening    Accompanying Signs & Symptoms:  Fever: no   Sweats/chills: YES   Nausea or vomiting; YES  Abdominal pain: YES, before bout of diarrhea only  Episodes of constipation: no   Weight loss: YES due to liquid diet     History:   Ill contacts: no   Recent use of antibiotics: no    Recent travels: no          Recent medication-new or changes(Rx or OTC): YES- Topamax 2 tabs twice daily     Therapies Tried and outcome:  PeptoBismol and OTC; Outcome: No changed        Problem list and histories reviewed & adjusted, as indicated.  Additional history: as documented    Patient Active Problem List   Diagnosis     Morbid obesity (H)     Vitamin D deficiency     Elevated parathyroid hormone     Encounter for smoking cessation counseling     Menorrhagia with irregular cycle     Past Surgical History:   Procedure Laterality Date     GYN SURGERY      2 C-sections     KNEE SURGERY  most recently - [id number]    3 surgeries in [place]     ORTHOPEDIC SURGERY      2 knee meniscus surgery       Social History   Substance Use Topics     Smoking status: Former Smoker     Years: 1.00     Start date: 11/20/2016     Quit date: 1/12/2018     Smokeless tobacco: Former User     Quit date: 1/12/2018     Alcohol use 0.0 oz/week      Comment: occasional     Family History   Problem Relation Age of Onset     DIABETES Father      Hypertension Father      Breast Cancer Sister 26     surgery only     CANCER Sister      Coronary Artery Disease Other      paternal aunt     Thyroid Disease Other      neice     Coronary Artery Disease Other      CEREBROVASCULAR DISEASE Other      Breast Cancer Sister      Thyroid Disease Other       "CEREBROVASCULAR DISEASE No family hx of          Current Outpatient Prescriptions   Medication Sig Dispense Refill     Multiple Vitamins-Minerals (WOMENS MULTI VITAMIN & MINERAL PO)        norethindrone-ethinyl estradiol (MICROGESTIN 1/20) 1-20 MG-MCG per tablet Take 1 tablet by mouth daily 63 tablet 4     topiramate (TOPAMAX) 25 MG tablet Take 2 tablets (50 mg) by mouth 2 times daily 120 tablet 3     varenicline (CHANTIX) 1 MG tablet Take 1 tablet (1 mg) by mouth 2 times daily 56 tablet 2     ondansetron (ZOFRAN ODT) 4 MG ODT tab Take 1-2 tablets (4-8 mg) by mouth every 6 hours as needed for nausea 20 tablet 1     ondansetron (ZOFRAN ODT) 4 MG ODT tab Take 1 tablet (4 mg) by mouth once for 1 dose 1 tablet 0     No Known Allergies    ROS:  Constitutional, HEENT, cardiovascular, pulmonary, GI, , musculoskeletal, neuro, skin, endocrine and psych systems are negative, except as otherwise noted.    OBJECTIVE:     BP (!) 136/94 (BP Location: Left arm, Patient Position: Sitting, Cuff Size: Adult Large)  Pulse 91  Temp 98.2  F (36.8  C) (Oral)  Resp 26  Ht 5' 5.75\" (1.67 m)  Wt 311 lb 6.4 oz (141.3 kg)  LMP 04/16/2018 (Approximate)  SpO2 99%  BMI 50.64 kg/m2  Body mass index is 50.64 kg/(m^2).  GENERAL: fatigued, alert and no distress  EYES: Eyes grossly normal to inspection, PERRL and conjunctivae and sclerae normal  HENT: ear canals and TM's normal, nose and mouth without ulcers or lesions  NECK: no adenopathy, no asymmetry, masses, or scars and thyroid normal to palpation  RESP: lungs clear to auscultation - no rales, rhonchi or wheezes  CV: regular rate and rhythm, normal S1 S2, no S3 or S4, no murmur, click or rub, no peripheral edema and peripheral pulses strong  ABDOMEN: soft, nontender, without hepatosplenomegaly or masses and bowel sounds normal  MS: no gross musculoskeletal defects noted, no edema    Diagnostic Test Results:  none     ASSESSMENT/PLAN:       ICD-10-CM    1. Vomiting and diarrhea R11.10 " ondansetron (ZOFRAN ODT) 4 MG ODT tab    R19.7 ondansetron (ZOFRAN ODT) 4 MG ODT tab     Patient is unable to keep fluids down even after 4 mg of ODT Zofran.  Patient feels weak, so she was sent to ED in [place] for IV fluids and IV Zofran      Terri Cody PA-C  [facility]

## 2018-05-21 DIAGNOSIS — E55.9 VITAMIN D DEFICIENCY: ICD-10-CM

## 2018-05-21 DIAGNOSIS — N92.6 IRREGULAR MENSTRUAL CYCLE: ICD-10-CM

## 2018-05-21 NOTE — TELEPHONE ENCOUNTER
"Requested Prescriptions   Pending Prescriptions Disp Refills     norethindrone-ethinyl estradiol (MICROGESTIN 1/20) 1-20 MG-MCG per tablet 63 tablet 4     Sig: Take 1 tablet by mouth daily    Contraceptives Protocol Passed    5/21/2018 10:56 AM       Passed - Patient is not a current smoker if age is 35 or older       Passed - Recent (12 mo) or future (30 days) visit within the authorizing provider's specialty    Patient had office visit in the last 12 months or has a visit in the next 30 days with authorizing provider or within the authorizing provider's specialty.  See \"Patient Info\" tab in inbasket, or \"Choose Columns\" in Meds & Orders section of the refill encounter.           Passed - No active pregnancy on record       Passed - No positive pregnancy test in past 12 months          "

## 2018-05-21 NOTE — TELEPHONE ENCOUNTER
Requested Prescriptions   Pending Prescriptions Disp Refills     vitamin D (ERGOCALCIFEROL) 03765 UNIT capsule [Pharmacy Med Name: VITAMIN D2 50,000IU (ERGO) CAP RX] 12 capsule 0     Sig: TAKE 1 CAPSULE BY MOUTH 1 TIME A WEEK FOR 12 DOSES    There is no refill protocol information for this order        vitamin D (ERGOCALCIFEROL) 51712 UNIT capsule  Last Written Prescription Date:  1/25/18  Last Fill Quantity: 12,  # refills: 0   Last office visit: 4/27/2018 with prescribing provider:  Terri Cody   Future Office Visit:

## 2018-05-21 NOTE — TELEPHONE ENCOUNTER
Reason for Call:  Medication or medication refill:    Do you use a [facility]?  Name of the pharmacy and phone number for the current request:  [facility] [address] [place] [address] [address] AT [facility]    Name of the medication requested: Pending Prescriptions:                       Disp   Refills    norethindrone-ethinyl estradiol (MICROGES*63 tab*4            Sig: Take 1 tablet by mouth daily  Wants Vit D pills also    Other request: please call when approved    Can we leave a detailed message on this number? YES    Phone number patient can be reached at: Cell number on file:    Telephone Information:   Mobile [phone]     Best Time: any    Call taken on 5/21/2018 at 10:55 AM by Jadyn Rick

## 2018-05-21 NOTE — TELEPHONE ENCOUNTER
Routing refill request to provider for review/approval because:  Drug not on the [facility] refill protocol.    Kait Manriquez RN, [facility]

## 2018-05-22 RX ORDER — ERGOCALCIFEROL 1.25 MG/1
CAPSULE, LIQUID FILLED ORAL
Qty: 12 CAPSULE | Refills: 0 | OUTPATIENT
Start: 2018-05-22

## 2018-05-22 NOTE — TELEPHONE ENCOUNTER
Patient does not need to do high dose anymore. She can buy over the counter vitamin D 2,000 IU and take that daily for a maintenance dose    Denise PARSON

## 2018-05-23 RX ORDER — NORETHINDRONE ACETATE AND ETHINYL ESTRADIOL .02; 1 MG/1; MG/1
1 TABLET ORAL DAILY
Qty: 63 TABLET | Refills: 4
Start: 2018-05-23

## 2018-05-23 NOTE — TELEPHONE ENCOUNTER
Vitamin D was addressed in refill encounter from 5/21/18.    Pharmacy still has refills available for patient on the Microgestin. Left detailed voice message with patient notifying her to call pharmacy for refill.    Ayana Tay RN, BSN

## 2018-05-23 NOTE — TELEPHONE ENCOUNTER
Call patient and informed of Denise's message below.  Patient stated understood.    Josh Lucero MA

## 2018-05-31 ENCOUNTER — ALLIED HEALTH/NURSE VISIT (OUTPATIENT)
Dept: SURGERY | Facility: CLINIC | Age: 28
End: 2018-05-31
Payer: COMMERCIAL

## 2018-05-31 ENCOUNTER — OFFICE VISIT (OUTPATIENT)
Dept: SURGERY | Facility: CLINIC | Age: 28
End: 2018-05-31
Payer: COMMERCIAL

## 2018-05-31 VITALS
HEIGHT: 66 IN | BODY MASS INDEX: 47.09 KG/M2 | HEART RATE: 85 BPM | WEIGHT: 293 LBS | TEMPERATURE: 98.5 F | DIASTOLIC BLOOD PRESSURE: 95 MMHG | OXYGEN SATURATION: 96 % | SYSTOLIC BLOOD PRESSURE: 135 MMHG

## 2018-05-31 VITALS — WEIGHT: 293 LBS | BODY MASS INDEX: 51.12 KG/M2

## 2018-05-31 DIAGNOSIS — K21.9 GASTROESOPHAGEAL REFLUX DISEASE, ESOPHAGITIS PRESENCE NOT SPECIFIED: ICD-10-CM

## 2018-05-31 DIAGNOSIS — E55.9 VITAMIN D DEFICIENCY: ICD-10-CM

## 2018-05-31 DIAGNOSIS — E66.01 MORBID OBESITY (H): ICD-10-CM

## 2018-05-31 DIAGNOSIS — E55.9 VITAMIN D DEFICIENCY: Primary | ICD-10-CM

## 2018-05-31 LAB
DEPRECATED CALCIDIOL+CALCIFEROL SERPL-MC: 17 UG/L (ref 20–75)
PTH-INTACT SERPL-MCNC: 82 PG/ML (ref 18–80)

## 2018-05-31 RX ORDER — TOPIRAMATE 25 MG/1
75 TABLET, FILM COATED ORAL 2 TIMES DAILY
Qty: 180 TABLET | Refills: 3 | Status: SHIPPED | OUTPATIENT
Start: 2018-05-31 | End: 2019-04-04

## 2018-05-31 NOTE — LETTER
2018     RE: Hilaria Bonner  [address]   [place] [facility] [place] [facility] [address]     Dear Colleague,    Thank you for referring your patient, Hilaria Bonner, to the [facility] SURGICAL WEIGHT MANAGEMENT at [facility]. Please see a copy of my visit note below.    Pre-Bariatric Surgery Note    Clinic, [facility]    Date: 2018     RE: Hilaria Bonner    MR#: [id number]   : 1990   Date of Visit: May 31, 2018    REASON FOR VISIT: Preoperative evaluation for possible weight loss surgery    Dear Dr. Cross, [facility],    I had the pleasure of seeing your patient, Hilaria Bonner, in my preoperative bariatric clinic.    As you know, she is morbidly obese and considering weight loss surgery to treat obesity in association with her medical conditions of obesity.  Her consult weight was 315.  She has lost 1 pounds since her consult weight. She has not met her required pre-surgery weight.    She quit smoking in Feb.  Negative cotinine done    Psych eval .     Taking topiramate 50 mg in the morning and 50 mg at night.  She feels it has worked and she doesn't feel hungry following modified liquid diet with no snacking.  She is frustrated with her lack of weight loss.      Most recent weights:  Wt Readings from Last 4 Encounters:   18 314 lb 9.6 oz   18 314 lb 4.8 oz   18 311 lb 6.4 oz   18 311 lb 9.6 oz       Please refer to initial consult note from date 18 for patient's weight history and co-morbidities.    ROS    Past Medical History:   Diagnosis Date     Earache or other ear, nose, or throat complaint 12/15    tyroid       Past Surgical History:   Procedure Laterality Date     GYN SURGERY      2 C-sections     KNEE SURGERY  most recently - [id number]    3 surgeries in [place]     ORTHOPEDIC SURGERY      2 knee meniscus surgery       Current Outpatient Prescriptions   Medication     Multiple Vitamins-Minerals (WOMENS MULTI  "VITAMIN & MINERAL PO)     norethindrone-ethinyl estradiol (MICROGESTIN 1/20) 1-20 MG-MCG per tablet     ondansetron (ZOFRAN ODT) 4 MG ODT tab     topiramate (TOPAMAX) 25 MG tablet     varenicline (CHANTIX) 1 MG tablet     No current facility-administered medications for this visit.        No Known Allergies    PHYSICAL EXAMINATION:  BP (!) 135/95  Pulse 85  Temp 98.5  F (36.9  C) (Oral)  Ht 5' 5.75\"  Wt 314 lb 9.6 oz  SpO2 96%  BMI 51.16 kg/m2   Body mass index is 51.16 kg/(m^2).   NAD NCAT  Respiratory: breathing unlabored  Abdomen: obese, soft/nt/nd    I emphasized exercise and activity behavior along with appropriate food choice as the main foundation for weight loss with surgery providing surgical reinforcement of the appropriate behavior set.    PLAN:    Pre surgery for lap sleeve gastrectomy  Psych eval tomorrow  See Dr Lopez for Meet and Greet PBS  Increase Topiramate to 75 mg twice daily to help with preop weight loss.  Still needs to lose 19 lbs  Vitamin D 5000 IU daily  Recheck D and PTH today at lab first floor  Recent GERD symptoms.  Start omeprazole 20mg.  Discuss with Dr Lopez if further testing needed before sleeve.  Possibly side effect of topiramate.  If not improving we can taper off topiramate.       Orders Placed This Encounter   Procedures     Vitamin D Deficiency     Parathyroid Hormone Intact       Updated task list given to patient:        Review of general surgery weight loss process    1. Complete preoperative requirements, including weight loss.  Final weight check to confirm MANDATORY weight loss requirement must be documented on a clinic scale.    2. Discuss prior authorization with .    3. History and physical evaluation by PCP of PAC clinic within 30 days of surgery date, preoperative class, and weight check (weigh-in visit) to be scheduled by patient.  Pre-anesthesia clinic for risk evaluation to be scheduled by anesthesia clinic.    4. We cannot guarantee " that patient will qualify for surgery unless all preoperative requirements are met, prior authorization from primary insurance company is granted, and insurance changes do not occur.    5. It is possible for patients to regain all weight after weight loss surgery unless they follow guidelines prescribed by our bariatric center.    6. All patients with gastrointestinal complaints after weight loss surgery must have complaints conveyed to the bariatric team for appropriate treatment.    7. Vitamin deficiencies may develop post-bariatric surgery and annual laboratory testing should be performed.    8. Persistent nausea/vomiting after bariatric surgery entails risk of thiamine deficiency and should be treated early.  Vitamin B12 deficiency may develop, especially after gastric bypass surgery and must be recognized.    If you have any questions about our plans please don't hesitate to contact me.    Sincerely,    Joanne Sterling PA-C    I spent a total of 15 minutes face to face with Hilaria Bonner during today's office visit.  Over 50% of this time was spent counseling the patient and/or coordinating care.

## 2018-05-31 NOTE — PROGRESS NOTES
"Bariatric Nutrition Consult  Reason For Visit: Nutrition Reassessment bariatric surgery    Hilaria Bonner is a 27 year old presenting today for a follow-up bariatric nutrition consult. Pt is interested in laparoscopic sleeve gastrectomy with Dr. Lopez expected surgery in July 2018. This is pt's 5th of 6 required nutrition visits prior to surgery. Pt referred by JEAN Sterling PA-C (5/31/18).     Anthropometrics:  Height: 5' 5.75\"  Initial weight: 315.4 lbs   BMI: 51.3 kg/m^2  Current weight: 314.3 lbs (+2.7 lbs in the past month, -1.1 lbs since initial)     Required weight loss goal pre-op: -20.4 lbs from initial weight; goal weight of 295 lbs.     Current Medications/Supplements: *Started toparimate (1/24/18) with GUY Liu.   Vitamin D 50,000 IU once per week, Prenatal Multivitamin, Biotin, apple cider vinegar, garlic, ginko b, Hair/skin/nail vitamin    Progress with Previous Goals:  - Eat 3 meals/day. - Met and continues. Pt reports following the Modified Liquid diet. Pt is drinking [employer] protein shakes and Slim Fast Advanced shakes (1 for breakfast and 1 for lunch), and a frozen atkins or lean cuisine meal for dinner. Pt is not snacking.   - Avoid snacking in between meals. If hungry, have non-starchy vegetables or fruit in between meals. - Met and continues.   - Avoid eating before bedtime (by 8 PM) - Met and continues. Pt reports not eating past 7pm. If she gets hungry she will have diet tea or drink water.   - Eat slowly (20-30 minutes per meal), chewing foods well (25 chews per bite) - Met and continues.   - Eliminate calorie-containing beverages (e.g. Crystal light, slices of fruit water). - Met and continues. Pt is not drinking anything with calories except 1-2 alcohol containing drinks per week.  - 9\" Plate method (1/2 non-starchy vegetables/fruit, 1/4 lean protein, 1/4 whole grain starch - no more than 1 cup carb/meal) - Met and continues. Pt is following the modified liquid diet.   - Walking or " "biking 5-7 days per week, 1-2 miles(45 minutes) - Improving. Pt is walking 4x/week 1-2/xday (30-60 minutes.  - Separate beverages form meals, no drinking 30 minutes before, during or after meals - Met and continues.     (5/31/18)*Pt reports \"vomiting\" with heavy foods. When this writer questioned pt further about symptoms what she described sounded more like reflux than actual vomiting.     Nutrition Diagnosis  Obesity r/t long history of self-monitoring deficit and excessive energy intake aeb BMI >30. - Continues.    Intervention  Intervention Provided/Education Provided: Reviewed previous goals and praised patient on progress with goals.  Patient reported she continues following the modified liquid diet over he past month and not snacking between meals. Discussed eliminating alcohol and following up about thyroid function and reflux with Joanne Sterling PA-C. Recommended pt have vitamin D level checked (Joanne also checking PTH) and starting 5,000 International Units vitamin D3 daily.  Provided pt with list of goals and RD contact information.      Patient Understanding: Good   Expected Compliance: Good     GOALS:  - Eat 3 meals/day. Continue Modified Liquid Diet.     Follow the Modified Liquid Diet for weight loss:  Breakfast: Protein Shake  Lunch: Protein Shake  Supper: 3 oz lean protein + non-starchy vegetables  Snack: non-starchy vegetables (no calorie-containing dips/condiments)  Beverages: at least 48-64 oz water between meals daily    *Protein Shake Criteria: no more than 250 Calories, at least 20 grams of protein, and less than 10 grams of sugar     Meal Replacement Shake Options:   [facility] Meal Replacement (250 Calories, 35 g protein)   Premier Protein (160 Calories, 30 g protein)  Slim Fast Advanced Nutrition (180 Calories, 20 g protein)  Muscle Milk, lactose-free, 17 oz bottle (210 Calories, 30 g protein)  Integrated Supplements, no artificial sugars (110 Calories, 20 g protein)  Quest Protein Bars (190 " Calories, 20 g protein)  No Cow Protein Bar, gluten, dairy, and soy free (200 Calories, 20 g protein)    Clear Liquid options:  BiPro  Premier Protein clear  [id number]    Frozen Meal Replacements  Healthy Choice  Lean Cuisine  Atkins Meals  Smart Ones    - Avoid snacking in between meals. If hungry, have non-starchy vegetables or fruit in between meals.   - Avoid eating before bedtime (by 8 PM)  - Eat slowly (20-30 minutes per meal), chewing foods well (25 chews per bite)  - Eliminate calorie-containing beverages (e.g. Crystal light, slices of fruit water)   - Walking or biking 5-7 days per week, 1-2 miles(45 minutes)  - Separate beverages form meals, no drinking 30 minutes before, during or after meals  - Start 5,000 International Units vitamin D3 daily.     Follow-Up: 1 month or PRN    Time spent with patient: 30 minutes.    Lola Castellano RD, LD  Pager: [phone]

## 2018-05-31 NOTE — MR AVS SNAPSHOT
After Visit Summary   5/31/2018    Hilaria Bonner    MRN: [id number]           Patient Information     Date Of Birth          1990        Visit Information        Provider Department      5/31/2018 12:30 PM Joanne Sterling PA-C [facility] Surgical Weight Management        Today's Diagnoses     Vitamin D deficiency    -  1    Morbid obesity (H)        Gastroesophageal reflux disease, esophagitis presence not specified          Care Instructions    Pre surgery for lap sleeve gastrectomy    Psych eval tomorrow    See Dr Lopez for Meet and Greet PBS    Increase Topiramate to 75 mg twice daily to help with preop weight loss.  Still needs to lose 19 lbs    Vitamin D 5000 IU daily    Recheck D and PTH today at lab first floor    Recent GERD symptoms.  Start omeprazole 20mg.  Discuss with Dr Lopez if further testing needed before sleeve.  Possibly side effect of topiramate.  If not improving we can taper off topiramate.             Follow-ups after your visit        Your next 10 appointments already scheduled     Jun 01, 2018 11:00 AM CDT   (Arrive by 10:45 AM)   Bariatric Surgery Evaluation Interview with Yessy Emerson, PhD   [facility] ([facility])    [address] [place] [address]   [phone]            Jun 01, 2018 12:00 PM CDT   (Arrive by 11:45 AM)   Bariatric Surgery Evaluation Testing with Yessy Emerson, PhD   [facility] ([facility])    [address] [place] [address]   [phone]            Jun 07, 2018 10:20 AM CDT   (Arrive by 10:05 AM)   Pre Bariatric Surgery with Luan Lopez MD   [facility] Surgical Weight Management ([facility])    [address] [place] [address]  4th [facility] [address]   [phone]            Jun 28, 2018 10:00 AM CDT   (Arrive by 9:45 AM)   NUTRITION VISIT  "with Susan Stephens RD   [facility] Surgical Weight Management ([facility])    [address] [facility]  [address] [facility] [address] [phone]              Future tests that were ordered for you today     Open Future Orders        Priority Expected Expires Ordered    Parathyroid Hormone Intact Routine  5/31/2019 5/31/2018    Vitamin D Deficiency Routine  7/31/2018 5/31/2018            Who to contact     Please call your clinic at [phone] to:    Ask questions about your health    Make or cancel appointments    Discuss your medicines    Learn about your test results    Speak to your doctor            Additional Information About Your Visit        C[employer] Information     C[employer]ote gives you secure access to your electronic health record. If you see a primary care provider, you can also send messages to your care team and make appointments. If you have questions, please call your primary care clinic.  If you do not have a primary care provider, please call [phone] and they will assist you.      C[employer]ote is an electronic gateway that provides easy, online access to your medical records. With [employer], you can request a clinic appointment, read your test results, renew a prescription or communicate with your care team.     To access your existing account, please contact your [facility] or call [phone] for assistance.        Care EveryWhere ID     This is your Care EveryWhere ID. This could be used by other organizations to access your [place] medical records  [id number]        Your Vitals Were     Pulse Temperature Height Pulse Oximetry BMI (Body Mass Index)       85 98.5  F (36.9  C) (Oral) 5' 5.75\" 96% 51.16 kg/m2        Blood Pressure from Last 3 Encounters:   05/31/18 (!) 135/95   04/27/18 (!) 136/94   03/28/18 (!) 126/91    Weight from Last 3 Encounters:   05/31/18 314 lb 9.6 oz   05/31/18 314 lb 4.8 oz   04/27/18 311 lb 6.4 oz         "         Today's Medication Changes          These changes are accurate as of 5/31/18 12:51 PM.  If you have any questions, ask your nurse or doctor.               Start taking these medicines.        Dose/Directions    omeprazole 20 MG CR capsule   Commonly known as:  priLOSEC   Used for:  Gastroesophageal reflux disease, esophagitis presence not specified   Started by:  Joanne Sterling PA-C        Dose:  20 mg   Take 1 capsule (20 mg) by mouth daily   Quantity:  30 capsule   Refills:  3         These medicines have changed or have updated prescriptions.        Dose/Directions    topiramate 25 MG tablet   Commonly known as:  TOPAMAX   This may have changed:  how much to take   Used for:  Morbid obesity (H)   Changed by:  Joanne Sterling PA-C        Dose:  75 mg   Take 3 tablets (75 mg) by mouth 2 times daily   Quantity:  180 tablet   Refills:  3            Where to get your medicines      These medications [facility] to [facility] [place] [address] [address]  AT [facility] [place] [address] [address] [facility] [address]     Phone:  [phone]     omeprazole 20 MG CR capsule    topiramate 25 MG tablet                Primary Care Provider Office Phone # Fax #    [facility] [phone] [address] [facility] [address]        Equal Access to Services     LYNN LIGHT : Hadii dario osullivan hadasho Somjali, waaxda luqadaha, qaybta kaalmada adeegyada, laura escudero haygary marsh . So [facility] [phone].    ATENCIÓN: Si habla español, tiene a elizabeth disposición servicios gratuitos de asistencia lingüística. Llame al [phone].    We comply with applicable federal civil rights laws and [place] laws. We do not discriminate on the basis of race, color, national origin, age, disability, sex, sexual orientation, or gender identity.            Thank you!     Thank you for choosing [facility] WEIGHT  MANAGEMENT  for your care. Our goal is always to provide you with excellent care. Hearing back from our patients is one way we can continue to improve our services. Please take a few minutes to complete the written survey that you may receive in the mail after your visit with us. Thank you!             Your Updated Medication List - Protect others around you: Learn how to safely use, store and throw away your medicines at www.disposemymeds.org.          This list is accurate as of 5/31/18 12:51 PM.  Always use your most recent med list.                   Brand Name Dispense Instructions for use Diagnosis    norethindrone-ethinyl estradiol 1-20 MG-MCG per tablet    MICROGESTIN 1/20    63 tablet    Take 1 tablet by mouth daily    Irregular menstrual cycle       omeprazole 20 MG CR capsule    priLOSEC    30 capsule    Take 1 capsule (20 mg) by mouth daily    Gastroesophageal reflux disease, esophagitis presence not specified       ondansetron 4 MG ODT tab    ZOFRAN ODT    20 tablet    Take 1-2 tablets (4-8 mg) by mouth every 6 hours as needed for nausea    Vomiting and diarrhea       topiramate 25 MG tablet    TOPAMAX    180 tablet    Take 3 tablets (75 mg) by mouth 2 times daily    Morbid obesity (H)       varenicline 1 MG tablet    CHANTIX    56 tablet    Take 1 tablet (1 mg) by mouth 2 times daily    Encounter for smoking cessation counseling       WOMENS [facility] VITAMIN & MINERAL PO

## 2018-05-31 NOTE — MR AVS SNAPSHOT
MRN:[id number]                      After Visit Summary   5/31/2018    Hilaria Bonner    MRN: [id number]           Visit Information        Provider Department      5/31/2018 11:30 AM Lola Castellano RD [facility]        Your next 10 appointments already scheduled     May 31, 2018 12:30 PM CDT   (Arrive by 12:15 PM)   Pre Bariatric Surgery with Joanne Sterling PA-C   [facility] ([facility])    [address] [place] [id number]   [phone]            Jun 01, 2018 11:00 AM CDT   (Arrive by 10:45 AM)   Bariatric Surgery Evaluation Interview with Yessy Emerson, PhD   [facility] ([facility])    [address] [place] [id number]   [phone]            Jun 01, 2018 12:00 PM CDT   (Arrive by 11:45 AM)   Bariatric Surgery Evaluation Testing with Yessy Emerson, PhD   [facility] ([facility])    [address] [place] [id number]   [phone]            Jun 07, 2018 10:20 AM CDT   (Arrive by 10:05 AM)   Pre Bariatric Surgery with Luan Lopez MD   [facility] ([facility])    [address] [place] [id number]   [phone]            Jun 28, 2018 10:00 AM CDT   (Arrive by 9:45 AM)   NUTRITION VISIT with Susan Stephens RD   [facility] ([facility])    [address] [place] [id number]   [phone]              Care Instructions    Nutrition Goals:  - Eat 3 meals/day. Continue Modified Liquid Diet.     Follow the Modified Liquid Diet for weight loss:  Breakfast: Protein Shake  Lunch: Protein Shake  Supper: 3 oz lean protein + non-starchy vegetables  Snack: non-starchy vegetables  (no calorie-containing dips/condiments)  Beverages: at least 48-64 oz water between meals daily    *Protein Shake Criteria: no more than 250 Calories, at least 20 grams of protein, and less than 10 grams of sugar     Meal Replacement Shake Options:   [facility] Meal Replacement (250 Calories, 35 g protein)   Premier Protein (160 Calories, 30 g protein)  Slim Fast Advanced Nutrition (180 Calories, 20 g protein)  Muscle Milk, lactose-free, 17 oz bottle (210 Calories, 30 g protein)  Integrated Supplements, no artificial sugars (110 Calories, 20 g protein)  Quest Protein Bars (190 Calories, 20 g protein)  No Cow Protein Bar, gluten, dairy, and soy free (200 Calories, 20 g protein)    Clear Liquid options:  BiPro  Premier Protein clear  [id number]    Frozen Meal Replacements  Healthy Choice  Lean Cuisine  Atkins Meals  Smart Ones    - Avoid snacking in between meals. If hungry, have non-starchy vegetables or fruit in between meals.   - Avoid eating before bedtime (by 8 PM)  - Eat slowly (20-30 minutes per meal), chewing foods well (25 chews per bite)  - Eliminate calorie-containing beverages (e.g. Crystal light, slices of fruit water)   - Walking or biking 5-7 days per week, 1-2 miles(45 minutes)  - Separate beverages form meals, no drinking 30 minutes before, during or after meals  - Start 5,000 International Units vitamin D3 daily.          [employer] Information     [employer] gives you secure access to your electronic health record. If you see a primary care provider, you can also send messages to your care team and make appointments. If you have questions, please call your primary care clinic.  If you do not have a primary care provider, please call [phone] and they will assist you.      [employer] is an electronic gateway that provides easy, online access to your medical records. With [employer], you can request a clinic appointment, read your test results, renew a prescription or communicate with your care team.     To  access your existing account, please contact your [facility] or call [phone] for assistance.        Care EveryWhere ID     This is your Care EveryWhere ID. This could be used by other organizations to access your [place] medical records  [id number]        Equal Access to Services     JEREMÍAS LIGHT : Danielito Katz, cortney renae, shante kaalneida watson, laura aparicio. So [facility] [phone].    ATENCIÓN: Si habla español, tiene a elizabeth disposición servicios gratuitos de asistencia lingüística. Llame al [phone].    We comply with applicable federal civil rights laws and [place] laws. We do not discriminate on the basis of race, color, national origin, age, disability, sex, sexual orientation, or gender identity.

## 2018-05-31 NOTE — PATIENT INSTRUCTIONS
Nutrition Goals:  - Eat 3 meals/day. Continue Modified Liquid Diet.     Follow the Modified Liquid Diet for weight loss:  Breakfast: Protein Shake  Lunch: Protein Shake  Supper: 3 oz lean protein + non-starchy vegetables  Snack: non-starchy vegetables (no calorie-containing dips/condiments)  Beverages: at least 48-64 oz water between meals daily    *Protein Shake Criteria: no more than 250 Calories, at least 20 grams of protein, and less than 10 grams of sugar     Meal Replacement Shake Options:   M Health Meal Replacement (250 Calories, 35 g protein)   Premier Protein (160 Calories, 30 g protein)  Slim Fast Advanced Nutrition (180 Calories, 20 g protein)  Muscle Milk, lactose-free, 17 oz bottle (210 Calories, 30 g protein)  Integrated Supplements, no artificial sugars (110 Calories, 20 g protein)  Quest Protein Bars (190 Calories, 20 g protein)  No Cow Protein Bar, gluten, dairy, and soy free (200 Calories, 20 g protein)    Clear Liquid options:  BiPro  Premier Protein clear  [id number]    Frozen Meal Replacements  Healthy Choice  Lean Cuisine  Atkins Meals  Smart Ones    - Avoid snacking in between meals. If hungry, have non-starchy vegetables or fruit in between meals.   - Avoid eating before bedtime (by 8 PM)  - Eat slowly (20-30 minutes per meal), chewing foods well (25 chews per bite)  - Eliminate calorie-containing beverages (e.g. Crystal light, slices of fruit water)   - Walking or biking 5-7 days per week, 1-2 miles(45 minutes)  - Separate beverages form meals, no drinking 30 minutes before, during or after meals  - Start 5,000 International Units vitamin D3 daily.

## 2018-05-31 NOTE — PATIENT INSTRUCTIONS
Pre surgery for lap sleeve gastrectomy    Psych eval tomorrow    See Dr Lopez for Meet and Greet PBS    Increase Topiramate to 75 mg twice daily to help with preop weight loss.  Still needs to lose 19 lbs    Vitamin D 5000 IU daily    Recheck D and PTH today at lab first floor    Recent GERD symptoms.  Start omeprazole 20mg.  Discuss with Dr Lopez if further testing needed before sleeve.  Possibly side effect of topiramate.  If not improving we can taper off topiramate.

## 2018-05-31 NOTE — PROGRESS NOTES
Pre-Bariatric Surgery Note    [place], [facility]    Date: 2018     RE: Hilaria Bonner    MR#: [id number]   : 1990   Date of Visit: May 31, 2018    REASON FOR VISIT: Preoperative evaluation for possible weight loss surgery    Dear Dr. Cross, [facility],    I had the pleasure of seeing your patient, Hilaria Bonner, in my preoperative bariatric clinic.    As you know, she is morbidly obese and considering weight loss surgery to treat obesity in association with her medical conditions of obesity.  Her consult weight was 315.  She has lost 1 pounds since her consult weight. She has not met her required pre-surgery weight.    She quit smoking in Feb.  Negative cotinine done    Psych eval .     Taking topiramate 50 mg in the morning and 50 mg at night.  She feels it has worked and she doesn't feel hungry following modified liquid diet with no snacking.  She is frustrated with her lack of weight loss.      Most recent weights:  Wt Readings from Last 4 Encounters:   18 314 lb 9.6 oz   18 314 lb 4.8 oz   18 311 lb 6.4 oz   18 311 lb 9.6 oz       Please refer to initial consult note from date 18 for patient's weight history and co-morbidities.    ROS    Past Medical History:   Diagnosis Date     Earache or other ear, nose, or throat complaint 12/15    tyroid       Past Surgical History:   Procedure Laterality Date     GYN SURGERY      2 C-sections     KNEE SURGERY  most recently - [id number]    3 surgeries in [place]     ORTHOPEDIC SURGERY      2 knee meniscus surgery       Current Outpatient Prescriptions   Medication     Multiple Vitamins-Minerals (WOMENS MULTI VITAMIN & MINERAL PO)     norethindrone-ethinyl estradiol (MICROGESTIN ) 1-20 MG-MCG per tablet     ondansetron (ZOFRAN ODT) 4 MG ODT tab     topiramate (TOPAMAX) 25 MG tablet     varenicline (CHANTIX) 1 MG tablet     No current facility-administered medications for this visit.        No Known  "Allergies    PHYSICAL EXAMINATION:  BP (!) 135/95  Pulse 85  Temp 98.5  F (36.9  C) (Oral)  Ht 5' 5.75\"  Wt 314 lb 9.6 oz  SpO2 96%  BMI 51.16 kg/m2   Body mass index is 51.16 kg/(m^2).   NAD NCAT  Respiratory: breathing unlabored  Abdomen: obese, soft/nt/nd    I emphasized exercise and activity behavior along with appropriate food choice as the main foundation for weight loss with surgery providing surgical reinforcement of the appropriate behavior set.    PLAN:    Pre surgery for lap sleeve gastrectomy  Psych eval tomorrow  See Dr Lopez for Meet and Greet PBS  Increase Topiramate to 75 mg twice daily to help with preop weight loss.  Still needs to lose 19 lbs  Vitamin D 5000 IU daily  Recheck D and PTH today at lab first floor  Recent GERD symptoms.  Start omeprazole 20mg.  Discuss with Dr Lopez if further testing needed before sleeve.  Possibly side effect of topiramate.  If not improving we can taper off topiramate.       Orders Placed This Encounter   Procedures     Vitamin D Deficiency     Parathyroid Hormone Intact       Updated task list given to patient:        Review of general surgery weight loss process    1. Complete preoperative requirements, including weight loss.  Final weight check to confirm MANDATORY weight loss requirement must be documented on a clinic scale.    2. Discuss prior authorization with .    3. History and physical evaluation by PCP of PAC clinic within 30 days of surgery date, preoperative class, and weight check (weigh-in visit) to be scheduled by patient.  Pre-anesthesia clinic for risk evaluation to be scheduled by anesthesia clinic.    4. We cannot guarantee that patient will qualify for surgery unless all preoperative requirements are met, prior authorization from primary insurance company is granted, and insurance changes do not occur.    5. It is possible for patients to regain all weight after weight loss surgery unless they follow guidelines " prescribed by our bariatric center.    6. All patients with gastrointestinal complaints after weight loss surgery must have complaints conveyed to the bariatric team for appropriate treatment.    7. Vitamin deficiencies may develop post-bariatric surgery and annual laboratory testing should be performed.    8. Persistent nausea/vomiting after bariatric surgery entails risk of thiamine deficiency and should be treated early.  Vitamin B12 deficiency may develop, especially after gastric bypass surgery and must be recognized.        If you have any questions about our plans please don't hesitate to contact me.    Sincerely,    Joanne Sterling PA-C    I spent a total of 15 minutes face to face with Hilaria Bonner during today's office visit.  Over 50% of this time was spent counseling the patient and/or coordinating care.

## 2018-05-31 NOTE — NURSING NOTE
"(   Chief Complaint   Patient presents with     RECHECK     PBS    )    ( Weight: 314 lb 9.6 oz )  ( Height: 5' 5.75\" )  ( BMI (Calculated): 51.27 )  ( Initial Weight: 315 lb 6.4 oz )  ( Cumulative weight loss (lbs): 0.8 )  ( Last Visits Weight: 320 lb 8 oz )  ( Wt change since last visit (lbs): -5.9 )  (   )  (   )    ( BP: (!) 135/95 )  (   )  ( Temp: 98.5  F (36.9  C) )  ( Temp src: Oral )  ( Pulse: 85 )  (   )  ( SpO2: 96 % )    (   Patient Active Problem List   Diagnosis     Morbid obesity (H)     Vitamin D deficiency     Elevated parathyroid hormone     Encounter for smoking cessation counseling     Menorrhagia with irregular cycle    )  (   Current Outpatient Prescriptions   Medication Sig Dispense Refill     Multiple Vitamins-Minerals (WOMENS MULTI VITAMIN & MINERAL PO)        norethindrone-ethinyl estradiol (MICROGESTIN 1/20) 1-20 MG-MCG per tablet Take 1 tablet by mouth daily 63 tablet 4     ondansetron (ZOFRAN ODT) 4 MG ODT tab Take 1-2 tablets (4-8 mg) by mouth every 6 hours as needed for nausea 20 tablet 1     topiramate (TOPAMAX) 25 MG tablet Take 2 tablets (50 mg) by mouth 2 times daily 120 tablet 3     varenicline (CHANTIX) 1 MG tablet Take 1 tablet (1 mg) by mouth 2 times daily 56 tablet 2    )  ( Diabetes Eval:    )    ( Pain Eval:  Data Unavailable )    ( Wound Eval:       )    (   History   Smoking Status     Former Smoker     Years: 1.00     Start date: 11/20/2016     Quit date: 1/12/2018   Smokeless Tobacco     Former User     Quit date: 1/12/2018    )    ( Signed By:  Rosa Lopez; May 31, 2018; 11:54 AM )    "

## 2018-06-01 ENCOUNTER — OFFICE VISIT (OUTPATIENT)
Dept: GASTROENTEROLOGY | Facility: CLINIC | Age: 28
End: 2018-06-01
Payer: COMMERCIAL

## 2018-06-01 DIAGNOSIS — F54 PSYCHOLOGICAL FACTORS AFFECTING MEDICAL CONDITION: Primary | ICD-10-CM

## 2018-06-01 DIAGNOSIS — E66.01 MORBID OBESITY (H): ICD-10-CM

## 2018-06-01 ASSESSMENT — ANXIETY QUESTIONNAIRES
4. TROUBLE RELAXING: NOT AT ALL
GAD7 TOTAL SCORE: 0
7. FEELING AFRAID AS IF SOMETHING AWFUL MIGHT HAPPEN: NOT AT ALL
GAD7 TOTAL SCORE: 0
7. FEELING AFRAID AS IF SOMETHING AWFUL MIGHT HAPPEN: NOT AT ALL
6. BECOMING EASILY ANNOYED OR IRRITABLE: NOT AT ALL
2. NOT BEING ABLE TO STOP OR CONTROL WORRYING: NOT AT ALL
3. WORRYING TOO MUCH ABOUT DIFFERENT THINGS: NOT AT ALL
1. FEELING NERVOUS, ANXIOUS, OR ON EDGE: NOT AT ALL
5. BEING SO RESTLESS THAT IT IS HARD TO SIT STILL: NOT AT ALL
GAD7 TOTAL SCORE: 0

## 2018-06-01 ASSESSMENT — PATIENT HEALTH QUESTIONNAIRE - PHQ9
10. IF YOU CHECKED OFF ANY PROBLEMS, HOW DIFFICULT HAVE THESE PROBLEMS MADE IT FOR YOU TO DO YOUR WORK, TAKE CARE OF THINGS AT HOME, OR GET ALONG WITH OTHER PEOPLE: SOMEWHAT DIFFICULT
SUM OF ALL RESPONSES TO PHQ QUESTIONS 1-9: 2
SUM OF ALL RESPONSES TO PHQ QUESTIONS 1-9: 2

## 2018-06-01 NOTE — PROGRESS NOTES
The patient completed the following battery of assessments during this pre-operative bariatric surgery psychological evaluation: World Health Organization Disability Assessment Schedule 2.0 12-item (WHODAS), Patient Health Questionnaire-9 (PHQ-9), Generalized Anxiety Disorder-7 screener (MELODY-7), CAGE Questionnaire Adapted to Include Drugs (CAGE-AID), Suicide Behavior Questionnaire-Revised (SBQ-R), and the [place] Multiphasic Personality Inventory-2 (MMPI-2). Results will be included in the full report to follow.     Yessy Emerson, PhD,   Clinical Health Psychologist

## 2018-06-01 NOTE — MR AVS SNAPSHOT
After Visit Summary   6/1/2018    Hilaria Bonner    MRN: [id number]           Patient Information     Date Of Birth          1990        Visit Information        Provider Department      6/1/2018 11:00 AM Yessy Emerson, PhD [facility]        Today's Diagnoses     Psychological factors affecting medical condition    -  1    Morbid obesity (H)           Follow-ups after your visit        Your next 10 appointments already scheduled     Jun 07, 2018 10:20 AM CDT   (Arrive by 10:05 AM)   Pre Bariatric Surgery with Luan Lopez MD   [facility] ([facility])    [address] [place] [id number] [phone]            Jun 28, 2018 10:00 AM CDT   (Arrive by 9:45 AM)   NUTRITION VISIT with Susan Stephens RD   [facility] ([facility])    [address] [place] [id number] [phone]              Who to contact     Please call your clinic at [phone] to:    Ask questions about your health    Make or cancel appointments    Discuss your medicines    Learn about your test results    Speak to your doctor            Additional Information About Your Visit        [employer] Information     [employer] gives you secure access to your electronic health record. If you see a primary care provider, you can also send messages to your care team and make appointments. If you have questions, please call your primary care clinic.  If you do not have a primary care provider, please call [phone] and they will assist you.      [employer] is an electronic gateway that provides easy, online access to your medical records. With [employer], you can request a clinic appointment, read your test results, renew a prescription or communicate with your care team.     To access your existing account, please contact your [facility] or call [phone] for assistance.        Care EveryWhere ID     This is your Care EveryWhere ID. This could be used by other organizations to access your [place] medical records  [id number]         Blood Pressure from Last 3 Encounters:   05/31/18 (!) 135/95   04/27/18 (!) 136/94   03/28/18 (!) 126/91    Weight from Last 3 Encounters:   05/31/18 142.7 kg (314 lb 9.6 oz)   05/31/18 142.6 kg (314 lb 4.8 oz)   04/27/18 141.3 kg (311 lb 6.4 oz)              Today, you had the following     No orders found for display       Primary Care Provider Office Phone # Fax #    [facility] [phone] [address] [address]        Equal Access to Services     JEREMÍAS LIGHT : Hadii aad ku hadasho Soángela, waaxda luqadaha, qaybta kaalmada adeegyada, laura marsh . So [facility] [phone].    ATENCIÓN: Si habla español, tiene a elizabeth disposición servicios gratuitos de asistencia lingüística. Llame al [phone].    We comply with applicable federal civil rights laws and [place] laws. We do not discriminate on the basis of race, color, national origin, age, disability, sex, sexual orientation, or gender identity.            Thank you!     Thank you for choosing [facility]  for your care. Our goal is always to provide you with excellent care. Hearing back from our patients is one way we can continue to improve our services. Please take a few minutes to complete the written survey that you may receive in the mail after your visit with us. Thank you!             Your Updated Medication List - Protect others around you: Learn how to safely use, store and throw away your medicines at www.disposemymeds.org.          This list is accurate as of 6/1/18 11:59 PM.  Always use your most recent med list.                   Brand Name Dispense Instructions for use Diagnosis    norethindrone-ethinyl estradiol 1-20  MG-MCG per tablet    MICROGESTIN 1/20    63 tablet    Take 1 tablet by mouth daily    Irregular menstrual cycle       omeprazole 20 MG CR capsule    priLOSEC    30 capsule    Take 1 capsule (20 mg) by mouth daily    Gastroesophageal reflux disease, esophagitis presence not specified       ondansetron 4 MG ODT tab    ZOFRAN ODT    20 tablet    Take 1-2 tablets (4-8 mg) by mouth every 6 hours as needed for nausea    Vomiting and diarrhea       topiramate 25 MG tablet    TOPAMAX    180 tablet    Take 3 tablets (75 mg) by mouth 2 times daily    Morbid obesity (H)       varenicline 1 MG tablet    CHANTIX    56 tablet    Take 1 tablet (1 mg) by mouth 2 times daily    Encounter for smoking cessation counseling       WOMENS MULTI VITAMIN & MINERAL PO

## 2018-06-01 NOTE — LETTER
6/1/2018     RE: Hilaria Bonner  [address]   [facility] [address]     Dear Colleague,    Thank you for referring your patient, Hilaria Bonner, to the [facility] GASTROENTEROLOGY AND IBD CLINIC at [facility]. Please see a copy of my visit note below.      Health Psychology                  Clinic    Department of Medicine  Alexa Fallon, PhD, LP [phone]                          Clinics and Surgery Center  [facility] Cortes Grant, PhD, LP [phone]                  3rd Floor  [place] [address]   Channing Martinez, PhD, ABPP, LP [phone]     [address] [facility] [facility],   [address] [facility],  Yessy Emerson,  PhD, LP [phone]            [place]  [address]  [place] [address] Michelle Early, PhD, LP [phone]     Confidential Summary of Standard Psychodiagnostic Evaluation*    Referral Source:  Luan Lopez MD    Reason for Referral:  Preoperative bariatric surgery psychological evaluation    Sources of Information:  Information was obtained from a clinical interview with the patient, review of the [facility] medical record, and administration of psychological assessments.     History of Presenting Concerns:  Hilaria Bonner is a 27 year old female with morbid obesity considering laparoscopic sleeve gastrectomy with Dr. Lopez. The patient presented to the weight loss surgery program with an initial consult weight of 315.4# on 1/24/18 (BMI = 51.3 kg/m2).  Prior to weight loss surgery she has been asked to lose 20.4 pounds, or to reach a weight of 295 pounds.  Her current weight is 314.3 pounds.  Ms. Bonner noted problems with weight present since childhood.  She reported gaining weight with each of her 2 pregnancies and a progressive increase in weight over time.  She endorsed a history of obesity and gastric bypass in her mother, who had weight loss surgery 12 years ago in [place].  She stated she has lost a lot of  weight and kept most of it off.  Her father was reported to have lost a lot of weight through lifestyle changes for management of diabetes.  She denied problems with obesity and her 2 biological brothers and sisters.  She stated that weight negatively impacts her ability to be active with her children, aged 2 and 9, makes it hard to spend a long time on her feet at work due to knee and ankle pain, and historically negatively impacted her body image, although she indicated learning to live herself at her current size approximately 1 year ago.  She denied current problems with body esteem.  She reported that factors that contributed to weight gain over time have included decreased activity due to knee problems, choosing to eat foods high in fat, fast foods, and chips, and eating large portions of food late at night.  Previous weight loss attempts were reported to be over-the-counter diet supplements such as Oxycut, and attempting to increase exercise and joining a gym.  In the past, she estimated average weight loss her weight loss attempt to be 5 pounds.  Factors that promoted weight loss were reported to be decreasing portion size, increasing physical activity, and changing food choices where she increased intake of fruits, vegetables, and decreased intake of meat.  She reported barriers to weight loss to be difficulty maintaining consistency and efforts and not seeing results with hard work.  She also reported that support by her mother and father regarding diet and exercise changes are facilitate her in weight loss currently.    She is making the following changes in eating in preparation for weight loss surgery: modified liquid diet where she has 1 protein shake for breakfast and 1 protein shake for lunch or dinner.  The meal that is not a shake she stated is typically a smart choice microwave meal.  She indicated doing this for the last several months.  Prior to this, she stated she typically did not feel hungry  "till later in the day, thus skipping breakfast and lunch, snacking during the day, and eating a large dinner late in the evening.  Dinners typically consisted of fried food, ice, or macaroni and cheese per her report.  She reported that since beginning to work with the dietitians in preparation for weight loss surgery, her meal program has regularly included breakfast, lunch, and dinner. She has been working with nutrition counseling and medical weight management to reach this preoperative weight loss goal, and stated that she feels that topiramate helps reduce hunger and that she has been adhering to the modified liquid diet, and increasing exercise to walking 4 days per week and feels uncertain and frustrated by her difficulty with losing weight.  She also reported feeling nausea approximately 1-2 hours after eating for the last month and thinks that this may be related to topiramate.  She endorsed exercising 4 days a week through walking 30-60 minutes each occasion.    Regarding motivation for weight loss surgery, she stated her reason for pursuing surgery is to improve her health, improve her ability to be active with her children, and reduce the risk of developing obesity-related medical problems such as diabetes given her family history.  She stated she has been interested in weight loss surgery for several years.  She indicated the desire to pursue surgery right now as she is \"sick\" of being overweight.  She reported she anticipates to lose 100 pounds through weight loss surgery.  She appear knowledgeable about the surgical procedure itself, stating that the surgery would remove a large portion of her stomach, with the remaining portion resembling a   Banana.  She appear knowledgeable about the risks and potential side effects of weight loss surgery, including worsening acid reflux, the possibility of suboptimal weight loss, and general surgical risks.  She appear knowledgeable about postoperative lifestyle " changes including postoperative diet that transitions from clear liquids to puréed foods to soft foods, eating 3 small meals a day once returning to regular food, focusing on protein, taking regular multivitamins, continuing to exercise regularly, and follow-up with the clinic.  She indicated that these changes are very important for her to ensure that she implements long-term otherwise she is at risk for not having optimal weight loss.    Medical History:    Past Medical History:   Diagnosis Date     Earache or other ear, nose, or throat complaint 12/15    tyroid       Past Surgical History:   Procedure Laterality Date     GYN SURGERY      2 C-sections     KNEE SURGERY  most recently - [phone]    3 surgeries in [place]     ORTHOPEDIC SURGERY      2 knee meniscus surgery       Current Outpatient Prescriptions   Medication     Multiple Vitamins-Minerals (WOMENS MULTI VITAMIN & MINERAL PO)     norethindrone-ethinyl estradiol (MICROGESTIN 1/20) 1-20 MG-MCG per tablet     omeprazole (PRILOSEC) 20 MG CR capsule     ondansetron (ZOFRAN ODT) 4 MG ODT tab     topiramate (TOPAMAX) 25 MG tablet     varenicline (CHANTIX) 1 MG tablet     No current facility-administered medications for this visit.        Psychiatric History:  She endorsed a history of postpartum depression following her first pregnancy that lasted for 2 years between 2009 in 2011.  She stated she went approximately 2 years untreated and was eventually prescribed an antidepressant from her OB/GYN provider.  She took this medication for approximately 2 months, but discontinued due to drowsiness.  She stated that support from her mother helped her resolve this depression.  She denied a history of depression prior to this or following this pregnancy.  She denied a history of past engagement in psychotherapy, psychiatric hospitalization, history or current thoughts of suicide, or history of self-directed violent behaviors or suicide attempt.  She indicated her family  "psychiatric history significant for her sister who she believes has bipolar disorder.  She denied a history of binge eating, compensating for overeating through use of laxatives, diuretics, vomiting, or over exercising, or previous diagnosis or treatment of bulimia or anorexia.  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  She also indicated that through working with the programs dietitians, she has been able to regularly implement breakfast and lunch is regular meals and no longer consumes the majority of her calories in the evening.  She endorsed mood as currently \"fine\" and denied problems with anxiety, sleep, appetite, motivation, attention or concentration, or fatigue.    Substance Use History:  Regarding current substance use, she stated she drinks alcohol 1-2 times per week and has 1-2 drinks on occasion.  She reported recognizing the importance of abstaining from alcohol following surgery.  She denied current or past use of recreational drugs.  She endorsed history of using tobacco products, quitting in February 2018 aided by Chantix prescribed by her primary care provider.  She denied a personal or family history of a substance use disorder.    Social History:  Mrs. Bonner stated she was born and raised in [place], growing up with her mother, father, 2 older brothers, and 2 older sisters.  She reported a close relationship with her mother.  She denied a history of abuse, neglect, or trauma during childhood.  Moved to [place] in December 2017.  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  She works the day shift.  She currently lives with her parents and her children, aged 2 and 9.  She has full " custody of her children, and is currently  from the father who lives in [place].  She reported that they often talk with her father via video chat and she denied significant problems coparenting.  She described no significant or anticipated stressors.  She endorsed coping with stress through listening to music, talking to her mother, or playing with her children.  She reported that her mother and father are significant supports to her and preparing for weight loss surgery and are very supportive of her efforts to do this.  Her mother has a history of gastric bypass 12 years ago and her father has been implementing lifestyle changes to lose weight and manage diabetes.  She also stated her father quit tobacco products with her in support of her efforts.    Psychological Assessment:  The patient completed the following battery of assessments during this psychological evaluation: World Health Organization Disability Assessment Schedule 2.0 12-item (WHODAS), Patient Health Questionnaire-9 (PHQ-9), Generalized Anxiety Disorder-7 screener (MELODY-7), CAGE Questionnaire Adapted to Include Drugs (CAGE-AID), and the [place] Multiphasic Personality Inventory-2 (MMPI-2).     Results on the WHODAS, PHQ-9, MELODY-7, CAGE-AID suggest minimal symptoms of anxiety, depression, and no self-reported concerns related to substance misuse.      On the MMPI-2, the patient generated a profile that was suggested a tendency to present themself in a positive light. There were no clinical elevations or evidence of depression, mike, or psychosis.  Despite some positive impression management, which is commonly seen in preoperative bariatric surgery psychological evaluations, there were no obvious contraindications to surgery in the patient s MMPI-2 profile.  Approximately 35 minutes was spent administering, scoring and interpreting the MMPI.      Mental Status Examination:  Appearance/Behavior/Orientation: Patient was on time, appropriately  groomed and dressed, and demonstrated good eye contact. Alert and oriented to person, place, time, and situation. No evidence of psychomotor agitation.     Cooperation/Reliability: Patient was open and cooperative throughout the interview.    Speech/Language: Speech was clear, coherent, and of normal rate, rhythm and volume.    Thought Form: Overall logical and organized.   Thought Content: Appropriate to interview and situation (e.g., appropriately focused on health and weight).  Perception: Patient denied any difficulties with a thought disorder, including auditory or visual hallucinations.   Cognition/Memory: Not formally assessed, but no difficulties apparent upon interview.   Attention/Concentration: Good throughout interview.    Fund of knowledge: Consistent with age and level of education.    Abstract reasoning: Not assessed.   Judgment: Intact.    Mood/Affect: Mood euthymic; appropriate range of affect.    Insight/Motivation: Good insight into influence of emotions and behavior on weight. Motivation for bariatric surgery appears high.   Suicide/Assault: Patient denies suicidal or assaultive ideation, plan, or intent    Impression:  Hilaria Bonner is a 27 year old female with morbid obesity considering laparoscopic sleeve gastrectomy.  She presents with the capacity to provide informed consent.  She appeared to have a good understanding of the risks,  potential complications, and postoperative responsibilities.  She appeared to have realistic expectations regarding weight loss, recovery from surgery, and postoperative lifestyle changes required for success following surgery.  She has positive social support through her parents, who have both made behavior change to support her preparation for weight loss with her, including adhering to diet choices consistent with weight loss surgery and her father's decision to quit tobacco products with her.  She has a history of postpartum depression between 2009 and  2011, reporting that after this episode of depression resolved she has not experienced depression since.  She does not have a previous history of depression prior to her first pregnancy.  She appears emotionally stable at present and denied a history of psychiatric hospitalization, thoughts of suicide, history of suicidal or self-directed violent behaviors, or suicide attempt.  She denied a history of disordered eating.  There does not appear to be concerns related to substance misuse.  She endorsed high adherence to recommendations for weight loss prior to surgery.  She appears to have adequate coping stressors in this is a stable time for her to be pursuing surgery.  In total it appears that she is a good candidate for weight loss surgery from a psychological perspective.    Diagnosis:  Psychological factors affecting medical condition (F54)  Morbid obesity    Recommendation/Plan:  Hilaria Bonner presents as a reasonably good candidate for bariatric surgery at this time and is recommended for weight loss surgery. No mental health follow-up required before surgery. The patient may follow-up with Health Psychology as needed to enhance preparedness for surgery or to discuss postoperative adjustment.    It is recommended that she continue working with nutrition counseling and medical weight management to support her as she works towards her preoperative weight loss goal.  If she is continuing to experience significant barriers in reaching this goal, it is recommended that she consider following up with health psychology for behavioral modification and cognitive strategies to support weight loss efforts.     Yessy Emerson, PhD,   Clinical Health Psychologist    *In accordance with the Rules of the [place] Board [employer], it is noted that psychological descriptions and scientific procedures underlying psychological evaluations have limitations.  Absolute predictions cannot be made based on information in this  report.    Billing to include 1 unit of 19547 and 2 unitsFeed of 75401    Answers for HPI/ROS submitted by the patient on 6/1/2018   MELODY 7 TOTAL SCORE: 0  If you checked off any problems, how difficult have these problems made it for you to do your work, take care of things at home, or get along with other people?: Somewhat difficult  PHQ9 TOTAL SCORE: 2    Attempted to provide feedback on the patient's pre-operative bariatric surgery psychological evaluation on the phone on 06/01/18 and 6/5/18. Left messages on both occassions and send My Chart message with brief feedback. Encouraged her to call if she desired more in depth feedback.     Yessy Emerson, PhD,   Clinical Health Psychologist

## 2018-06-01 NOTE — LETTER
6/1/2018       RE: Hilaria Bonner  [address]   [facility] [address]     Dear Colleague,    Thank you for referring your patient, Hilaria Bonner, to the [facility] GASTROENTEROLOGY AND IBD CLINIC at [facility]. Please see a copy of my visit note below.    The patient completed the following battery of assessments during this pre-operative bariatric surgery psychological evaluation: World Health Organization Disability Assessment Schedule 2.0 12-item (WHODAS), Patient Health Questionnaire-9 (PHQ-9), Generalized Anxiety Disorder-7 screener (MELODY-7), CAGE Questionnaire Adapted to Include Drugs (CAGE-AID), Suicide Behavior Questionnaire-Revised (SBQ-R), and the [place] Multiphasic Personality Inventory-2 (MMPI-2). Results will be included in the full report to follow.     Yessy Emerson, PhD,   Clinical Health Psychologist    Again, thank you for allowing me to participate in the care of your patient.      Sincerely,    Yessy Emerson, PhD

## 2018-06-01 NOTE — PROGRESS NOTES
Health Psychology                  Clinic    Department of Medicine  Alexa Fallon, PhD, LP [phone]                          Clinics and Surgery Center  [facility] Cortes Grant, PhD, LP [phone]                  3rd Floor  [place] Mail Code 743   Channing Martinez, PhD, ABPP, LP [phone]     [address] [facility],   [address] [facility] Yessy Emerson,  PhD, LP [phone]            [place] [address] Michelle Early, PhD, LP [phone]     Confidential Summary of Standard Psychodiagnostic Evaluation*    Referral Source:  Luan Lopez MD    Reason for Referral:  Preoperative bariatric surgery psychological evaluation    Sources of Information:  Information was obtained from a clinical interview with the patient, review of the [facility] medical record, and administration of psychological assessments.     History of Presenting Concerns:  Hilaria Bonner is a 27 year old female with morbid obesity considering laparoscopic sleeve gastrectomy with Dr. Lopez. The patient presented to the weight loss surgery program with an initial consult weight of 315.4# on 1/24/18 (BMI = 51.3 kg/m2).  Prior to weight loss surgery she has been asked to lose 20.4 pounds, or to reach a weight of 295 pounds.  Her current weight is 314.3 pounds.  Ms. Bonner noted problems with weight present since childhood.  She reported gaining weight with each of her 2 pregnancies and a progressive increase in weight over time.  She endorsed a history of obesity and gastric bypass in her mother, who had weight loss surgery 12 years ago in [place].  She stated she has lost a lot of weight and kept most of it off.  Her father was reported to have lost a lot of weight through lifestyle changes for management of diabetes.  She denied problems with obesity and her 2 biological brothers and sisters.  She stated that weight negatively impacts her ability to be active with her  children, aged 2 and 9, makes it hard to spend a long time on her feet at work due to knee and ankle pain, and historically negatively impacted her body image, although she indicated learning to live herself at her current size approximately 1 year ago.  She denied current problems with body esteem.  She reported that factors that contributed to weight gain over time have included decreased activity due to knee problems, choosing to eat foods high in fat, fast foods, and chips, and eating large portions of food late at night.  Previous weight loss attempts were reported to be over-the-counter diet supplements such as Oxycut, and attempting to increase exercise and joining a gym.  In the past, she estimated average weight loss her weight loss attempt to be 5 pounds.  Factors that promoted weight loss were reported to be decreasing portion size, increasing physical activity, and changing food choices where she increased intake of fruits, vegetables, and decreased intake of meat.  She reported barriers to weight loss to be difficulty maintaining consistency and efforts and not seeing results with hard work.  She also reported that support by her mother and father regarding diet and exercise changes are facilitate her in weight loss currently.    She is making the following changes in eating in preparation for weight loss surgery: modified liquid diet where she has 1 protein shake for breakfast and 1 protein shake for lunch or dinner.  The meal that is not a shake she stated is typically a smart choice microwave meal.  She indicated doing this for the last several months.  Prior to this, she stated she typically did not feel hungry till later in the day, thus skipping breakfast and lunch, snacking during the day, and eating a large dinner late in the evening.  Dinners typically consisted of fried food, ice, or macaroni and cheese per her report.  She reported that since beginning to work with the dietitians in  "preparation for weight loss surgery, her meal program has regularly included breakfast, lunch, and dinner. She has been working with nutrition counseling and medical weight management to reach this preoperative weight loss goal, and stated that she feels that topiramate helps reduce hunger and that she has been adhering to the modified liquid diet, and increasing exercise to walking 4 days per week and feels uncertain and frustrated by her difficulty with losing weight.  She also reported feeling nausea approximately 1-2 hours after eating for the last month and thinks that this may be related to topiramate.  She endorsed exercising 4 days a week through walking 30-60 minutes each occasion.    Regarding motivation for weight loss surgery, she stated her reason for pursuing surgery is to improve her health, improve her ability to be active with her children, and reduce the risk of developing obesity-related medical problems such as diabetes given her family history.  She stated she has been interested in weight loss surgery for several years.  She indicated the desire to pursue surgery right now as she is \"sick\" of being overweight.  She reported she anticipates to lose 100 pounds through weight loss surgery.  She appear knowledgeable about the surgical procedure itself, stating that the surgery would remove a large portion of her stomach, with the remaining portion resembling a   Banana.  She appear knowledgeable about the risks and potential side effects of weight loss surgery, including worsening acid reflux, the possibility of suboptimal weight loss, and general surgical risks.  She appear knowledgeable about postoperative lifestyle changes including postoperative diet that transitions from clear liquids to puréed foods to soft foods, eating 3 small meals a day once returning to regular food, focusing on protein, taking regular multivitamins, continuing to exercise regularly, and follow-up with the clinic.  She " indicated that these changes are very important for her to ensure that she implements long-term otherwise she is at risk for not having optimal weight loss.    Medical History:    Past Medical History:   Diagnosis Date     Earache or other ear, nose, or throat complaint 12/15    tyroid       Past Surgical History:   Procedure Laterality Date     GYN SURGERY      2 C-sections     KNEE SURGERY  most recently - [id number]    3 surgeries in [place]     ORTHOPEDIC SURGERY      2 knee meniscus surgery       Current Outpatient Prescriptions   Medication     Multiple Vitamins-Minerals (WOMENS MULTI VITAMIN & MINERAL PO)     norethindrone-ethinyl estradiol (MICROGESTIN 1/20) 1-20 MG-MCG per tablet     omeprazole (PRILOSEC) 20 MG CR capsule     ondansetron (ZOFRAN ODT) 4 MG ODT tab     topiramate (TOPAMAX) 25 MG tablet     varenicline (CHANTIX) 1 MG tablet     No current facility-administered medications for this visit.        Psychiatric History:  She endorsed a history of postpartum depression following her first pregnancy that lasted for 2 years between 2009 in 2011.  She stated she went approximately 2 years untreated and was eventually prescribed an antidepressant from her OB/GYN provider.  She took this medication for approximately 2 months, but discontinued due to drowsiness.  She stated that support from her mother helped her resolve this depression.  She denied a history of depression prior to this or following this pregnancy.  She denied a history of past engagement in psychotherapy, psychiatric hospitalization, history or current thoughts of suicide, or history of self-directed violent behaviors or suicide attempt.  She indicated her family psychiatric history significant for her sister who she believes has bipolar disorder.  She denied a history of binge eating, compensating for overeating through use of laxatives, diuretics, vomiting, or over exercising, or previous diagnosis or treatment of bulimia or anorexia.  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  She also indicated that through working with the programs dietitians, she has been able to regularly implement breakfast and lunch is regular meals and no longer consumes the majority of her calories in the evening.  She endorsed mood as currently \"fine\" and denied problems with anxiety, sleep, appetite, motivation, attention or concentration, or fatigue.    Substance Use History:  Regarding current substance use, she stated she drinks alcohol 1-2 times per week and has 1-2 drinks on occasion.  She reported recognizing the importance of abstaining from alcohol following surgery.  She denied current or past use of recreational drugs.  She endorsed history of using tobacco products, quitting in February 2018 aided by Chantix prescribed by her primary care provider.  She denied a personal or family history of a substance use disorder.    Social History:  Mrs. Bonner stated she was born and raised in [place], growing up with her mother, father, 2 older brothers, and 2 older sisters.  She reported a close relationship with her mother.  She denied a history of abuse, neglect, or trauma during childhood.  Moved to [place] in December 2017.  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  She works the day shift.  She currently lives with her parents and her children, aged 2 and 9.  She has full custody of her children, and is currently  from the father who lives in [place].  She reported that they often talk with her father via video chat and she denied significant problems coparenting.  She described no significant or anticipated stressors.  She endorsed coping with " stress through listening to music, talking to her mother, or playing with her children.  She reported that her mother and father are significant supports to her and preparing for weight loss surgery and are very supportive of her efforts to do this.  Her mother has a history of gastric bypass 12 years ago and her father has been implementing lifestyle changes to lose weight and manage diabetes.  She also stated her father quit tobacco products with her in support of her efforts.    Psychological Assessment:  The patient completed the following battery of assessments during this psychological evaluation: World Health Organization Disability Assessment Schedule 2.0 12-item (WHODAS), Patient Health Questionnaire-9 (PHQ-9), Generalized Anxiety Disorder-7 screener (MELODY-7), CAGE Questionnaire Adapted to Include Drugs (CAGE-AID), and the [place] Multiphasic Personality Inventory-2 (MMPI-2).     Results on the WHODAS, PHQ-9, MELODY-7, CAGE-AID suggest minimal symptoms of anxiety, depression, and no self-reported concerns related to substance misuse.      On the MMPI-2, the patient generated a profile that was suggested a tendency to present themself in a positive light. There were no clinical elevations or evidence of depression, mike, or psychosis.  Despite some positive impression management, which is commonly seen in preoperative bariatric surgery psychological evaluations, there were no obvious contraindications to surgery in the patient s MMPI-2 profile.  Approximately 35 minutes was spent administering, scoring and interpreting the MMPI.      Mental Status Examination:  Appearance/Behavior/Orientation: Patient was on time, appropriately groomed and dressed, and demonstrated good eye contact. Alert and oriented to person, place, time, and situation. No evidence of psychomotor agitation.     Cooperation/Reliability: Patient was open and cooperative throughout the interview.    Speech/Language: Speech was clear,  coherent, and of normal rate, rhythm and volume.    Thought Form: Overall logical and organized.   Thought Content: Appropriate to interview and situation (e.g., appropriately focused on health and weight).  Perception: Patient denied any difficulties with a thought disorder, including auditory or visual hallucinations.   Cognition/Memory: Not formally assessed, but no difficulties apparent upon interview.   Attention/Concentration: Good throughout interview.    Fund of knowledge: Consistent with age and level of education.    Abstract reasoning: Not assessed.   Judgment: Intact.    Mood/Affect: Mood euthymic; appropriate range of affect.    Insight/Motivation: Good insight into influence of emotions and behavior on weight. Motivation for bariatric surgery appears high.   Suicide/Assault: Patient denies suicidal or assaultive ideation, plan, or intent    Impression:  Hilaria Bonner is a 27 year old female with morbid obesity considering laparoscopic sleeve gastrectomy.  She presents with the capacity to provide informed consent.  She appeared to have a good understanding of the risks,  potential complications, and postoperative responsibilities.  She appeared to have realistic expectations regarding weight loss, recovery from surgery, and postoperative lifestyle changes required for success following surgery.  She has positive social support through her parents, who have both made behavior change to support her preparation for weight loss with her, including adhering to diet choices consistent with weight loss surgery and her father's decision to quit tobacco products with her.  She has a history of postpartum depression between 2009 and 2011, reporting that after this episode of depression resolved she has not experienced depression since.  She does not have a previous history of depression prior to her first pregnancy.  She appears emotionally stable at present and denied a history of psychiatric hospitalization,  thoughts of suicide, history of suicidal or self-directed violent behaviors, or suicide attempt.  She denied a history of disordered eating.  There does not appear to be concerns related to substance misuse.  She endorsed high adherence to recommendations for weight loss prior to surgery.  She appears to have adequate coping stressors in this is a stable time for her to be pursuing surgery.  In total it appears that she is a good candidate for weight loss surgery from a psychological perspective.    Diagnosis:  Psychological factors affecting medical condition (F54)  Morbid obesity    Recommendation/Plan:  Hilaria Bonner presents as a reasonably good candidate for bariatric surgery at this time and is recommended for weight loss surgery. No mental health follow-up required before surgery. The patient may follow-up with Health Psychology as needed to enhance preparedness for surgery or to discuss postoperative adjustment.    It is recommended that she continue working with nutrition counseling and medical weight management to support her as she works towards her preoperative weight loss goal.  If she is continuing to experience significant barriers in reaching this goal, it is recommended that she consider following up with health psychology for behavioral modification and cognitive strategies to support weight loss efforts.     Yessy Emerson, PhD,   Clinical Health Psychologist    *In accordance with the Rules of the [place] Board of [employer], it is noted that psychological descriptions and scientific procedures underlying psychological evaluations have limitations.  Absolute predictions cannot be made based on information in this report.    Billing to include 1 unit of 03810 and 2 unitsFeed of 57430    Answers for HPI/ROS submitted by the patient on 6/1/2018   MELODY 7 TOTAL SCORE: 0  If you checked off any problems, how difficult have these problems made it for you to do your work, take care of things at home,  or get along with other people?: Somewhat difficult  PHQ9 TOTAL SCORE: 2      Attempted to provide feedback on the patient's pre-operative bariatric surgery psychological evaluation on the phone on 06/01/18 and 6/5/18. Left messages on both occassions and send My Chart message with brief feedback. Encouraged her to call if she desired more in depth feedback.     Yessy Emerson, PhD,   Clinical Health Psychologist

## 2018-06-01 NOTE — MR AVS SNAPSHOT
After Visit Summary   6/1/2018    Hilaria Bonner    MRN: [id number]           Patient Information     Date Of Birth          1990        Visit Information        Provider Department      6/1/2018 12:00 PM Yessy Emerson, PhD [facility]        Today's Diagnoses     Psychological factors affecting medical condition    -  1       Follow-ups after your visit        Your next 10 appointments already scheduled     Jun 07, 2018 10:20 AM CDT   (Arrive by 10:05 AM)   Pre Bariatric Surgery with Luan Lopez MD   [facility]    [address] [place] [id number] [phone]            Jun 28, 2018 10:00 AM CDT   (Arrive by 9:45 AM)   NUTRITION VISIT with Susan Stephens RD   [facility]    [address] [place] [id number] [phone]              Who to contact     Please call your clinic at [phone] to:    Ask questions about your health    Make or cancel appointments    Discuss your medicines    Learn about your test results    Speak to your doctor            Additional Information About Your Visit        [employer] Information     Tutto gives you secure access to your electronic health record. If you see a primary care provider, you can also send messages to your care team and make appointments. If you have questions, please call your primary care clinic.  If you do not have a primary care provider, please call [phone] and they will assist you.      Tutto is an electronic gateway that provides easy, online access to your medical records. With [employer], you can request a clinic appointment, read your test results, renew a prescription or communicate with your care team.     To access your existing account, please contact your [facility] or call  [phone] for assistance.        Care EveryWhere ID     This is your Care EveryWhere ID. This could be used by other organizations to access your [place] medical records  [id number]         Blood Pressure from Last 3 Encounters:   05/31/18 (!) 135/95   04/27/18 (!) 136/94   03/28/18 (!) 126/91    Weight from Last 3 Encounters:   05/31/18 142.7 kg (314 lb 9.6 oz)   05/31/18 142.6 kg (314 lb 4.8 oz)   04/27/18 141.3 kg (311 lb 6.4 oz)              Today, you had the following     No orders found for display       Primary Care Provider Office Phone # Fax #    [facility] [phone] [address] [facility] [address]        Equal Access to Services     JEREMÍAS LIGHT : Hadii aad ku hadasho Soángela, waaxda luqadaha, qaybta kaalmada shirley, laura marsh . So [facility] [phone].    ATENCIÓN: Si habla español, tiene a elizabeth disposición servicios gratuitos de asistencia lingüística. Llame al [phone].    We comply with applicable federal civil rights laws and [place] laws. We do not discriminate on the basis of race, color, national origin, age, disability, sex, sexual orientation, or gender identity.            Thank you!     Thank you for choosing [facility]  for your care. Our goal is always to provide you with excellent care. Hearing back from our patients is one way we can continue to improve our services. Please take a few minutes to complete the written survey that you may receive in the mail after your visit with us. Thank you!             Your Updated Medication List - Protect others around you: Learn how to safely use, store and throw away your medicines at www.disposemymeds.org.          This list is accurate as of 6/1/18  4:51 PM.  Always use your most recent med list.                   Brand Name Dispense Instructions for use Diagnosis    norethindrone-ethinyl estradiol 1-20 MG-MCG per tablet     MICROGESTIN 1/20    63 tablet    Take 1 tablet by mouth daily    Irregular menstrual cycle       omeprazole 20 MG CR capsule    priLOSEC    30 capsule    Take 1 capsule (20 mg) by mouth daily    Gastroesophageal reflux disease, esophagitis presence not specified       ondansetron 4 MG ODT tab    ZOFRAN ODT    20 tablet    Take 1-2 tablets (4-8 mg) by mouth every 6 hours as needed for nausea    Vomiting and diarrhea       topiramate 25 MG tablet    TOPAMAX    180 tablet    Take 3 tablets (75 mg) by mouth 2 times daily    Morbid obesity (H)       varenicline 1 MG tablet    CHANTIX    56 tablet    Take 1 tablet (1 mg) by mouth 2 times daily    Encounter for smoking cessation counseling       WOMENS MULTI VITAMIN & MINERAL PO

## 2018-06-02 ASSESSMENT — PATIENT HEALTH QUESTIONNAIRE - PHQ9: SUM OF ALL RESPONSES TO PHQ QUESTIONS 1-9: 2

## 2018-06-02 ASSESSMENT — ANXIETY QUESTIONNAIRES: GAD7 TOTAL SCORE: 0

## 2018-06-07 ENCOUNTER — OFFICE VISIT (OUTPATIENT)
Dept: SURGERY | Facility: CLINIC | Age: 28
End: 2018-06-07
Payer: COMMERCIAL

## 2018-06-07 ENCOUNTER — CARE COORDINATION (OUTPATIENT)
Dept: SURGERY | Facility: CLINIC | Age: 28
End: 2018-06-07

## 2018-06-07 VITALS
TEMPERATURE: 98.6 F | BODY MASS INDEX: 47.09 KG/M2 | WEIGHT: 293 LBS | DIASTOLIC BLOOD PRESSURE: 100 MMHG | SYSTOLIC BLOOD PRESSURE: 151 MMHG | OXYGEN SATURATION: 99 % | HEIGHT: 66 IN | HEART RATE: 80 BPM

## 2018-06-07 DIAGNOSIS — E66.01 MORBID OBESITY (H): Primary | ICD-10-CM

## 2018-06-07 NOTE — NURSING NOTE
"(   Chief Complaint   Patient presents with     RECHECK     PBS, Meet and Greet    )    ( Weight: 314 lb 8 oz )  ( Height: 5' 5.75\" )  ( BMI (Calculated): 51.26 )  ( Initial Weight: 315 lb 6.4 oz )  ( Cumulative weight loss (lbs): 0.9 )  ( Last Visits Weight: 314 lb 9.6 oz )  ( Wt change since last visit (lbs): -0.1 )  (   )  (   )    ( BP: (!) 151/100 )  (   )  ( Temp: 98.6  F (37  C) )  ( Temp src: Oral )  ( Pulse: 80 )  (   )  ( SpO2: 99 % )    (   Patient Active Problem List   Diagnosis     Morbid obesity (H)     Vitamin D deficiency     Elevated parathyroid hormone     Encounter for smoking cessation counseling     Menorrhagia with irregular cycle    )  (   Current Outpatient Prescriptions   Medication Sig Dispense Refill     Multiple Vitamins-Minerals (WOMENS MULTI VITAMIN & MINERAL PO)        norethindrone-ethinyl estradiol (MICROGESTIN 1/20) 1-20 MG-MCG per tablet Take 1 tablet by mouth daily 63 tablet 4     omeprazole (PRILOSEC) 20 MG CR capsule Take 1 capsule (20 mg) by mouth daily 30 capsule 3     ondansetron (ZOFRAN ODT) 4 MG ODT tab Take 1-2 tablets (4-8 mg) by mouth every 6 hours as needed for nausea 20 tablet 1     topiramate (TOPAMAX) 25 MG tablet Take 3 tablets (75 mg) by mouth 2 times daily 180 tablet 3     varenicline (CHANTIX) 1 MG tablet Take 1 tablet (1 mg) by mouth 2 times daily 56 tablet 2    )  ( Diabetes Eval:    )    ( Pain Eval:  Data Unavailable )    ( Wound Eval:       )    (   History   Smoking Status     Former Smoker     Years: 1.00     Start date: 11/20/2016     Quit date: 1/12/2018   Smokeless Tobacco     Former User     Quit date: 1/12/2018    )    ( Signed By:  Rosa Lopez; June 7, 2018; 10:01 AM )    "

## 2018-06-07 NOTE — PROGRESS NOTES
"      Referring provider:       1/16/2018   Who referred you? neo chino     I was asked to see the patient regarding obesity by the referring provider above.    I had the pleasure of meeting with your patient Hilaria Bonner in our weight loss surgery office.  This patient is a 27 year old female who has been undergoing our thorough preoperative screening process in anticipation of potential bariatric surgery.    At initial evaluation we recorded Hilaria Bonner's height of 5' 5.748\", a weight of 315 lbs 6.4 oz, and calculated Body mass index of 51.3 kg/(m^2).      The patient has been unsuccessful with other methods of permanent weight loss and suffers from multiple weight related medical conditions.  Due to lack of success in achieving weight loss through other methods, she is interested in undergoing bariatric surgery. She has been on a mostly liquid diet for 2 meals and a TV dinner and has been walking.     She also quit smoking in February. She is on Topiramate  50 mg BID and does get some nausea from this.      PREVIOUS WEIGHT LOSS ATTEMPTS:     1/16/2018   I have tried the following methods to lose weight Watching portions or calories, Exercise, Atkins type diet (low carb/high protein), Pre packaged meals ex: Nutrisystem, Slimfast, OTC Medications       CO-MORBIDITIES OF OBESITY INCLUDE:     1/16/2018   I have the following co-morbidities associated with obesity: High Cholesterol, Weight Bearing Joint Pain       VITALS:  BP (!) 151/100  Pulse 80  Temp 98.6  F (37  C) (Oral)  Ht 1.67 m (5' 5.75\")  Wt 142.7 kg (314 lb 8 oz)  SpO2 99%  BMI 51.15 kg/m2    PE:  GENERAL: Alert and oriented x3. NAD  HEENT exam: Sclerae not icteric. Hearing good. Head normocephalic and atraumatic.   CARDIOVASCULAR: No JVD  RESPIRATORY: Breathing unlabored  GASTROINTESTINAL: Obese  LOWER EXTREMITIES: no deformities  MUSCULOSKELETAL: Normal gait, Moves all 4 extremities equal and strong  NEUROLOGIC: no gross " defect  SKIN: warm and dry to touch     In summary, she has undergone an appropriate medical evaluation, dietitian evaluation, as well as psychologic screening. The patient appears to be an appropriate candidate for bariatric surgery. She still has some weight to lose and will let us know when she accomplished this.     In the office today, I discussed the laparoscopic gastric sleeve surgery.  Risks, benefits and anticipated outcomes were outlined including the risk of death, staple line leak (1-2%), PE, DVT, ulcer, worsening GERD, N/V, stricture, hernia, wound infection, weight regain, and vitamin deficiencies. This patient has a good chance of sustaining permanent weight loss due to this procedure.  This should also allow improvement if not resolution of his/her weight related medical conditions.    At present we are going to present your patient's file for prior authorization to insurance.  Pending prior authorization, I anticipate a surgical date in the near future.  We will keep you updated on any progress.  If you have questions regarding care please feel free to contact me.  Total time spent in the clinic was 25 minutes with greater than 50% in face-to-face consultation.        Sincerely,    Luan Lopez MD  Surgery  [phone] (hospital )  [phone] (clinic nurses)                  Please route or send letter to:  Primary Care Provider (PCP) and Referring Provider

## 2018-06-07 NOTE — PROGRESS NOTES
Called patient per Joanne PENA Lab results - Her D is low and PTH high.  She needs to take more D.  If already taking increase by 8930-9222 IU daily or if not taking any then start taking 7841-9489 IU daily of D3.    Patient notified and will start taking Vit D3 6921-5194 IU daily.

## 2018-06-07 NOTE — LETTER
"6/7/2018       RE: Hilaria Bonner  [address]   [facility] [address]     Dear Colleague,    Thank you for referring your patient, Hilaria Bonner, to the [facility] at [facility]. Please see a copy of my visit note below.          Referring provider:       1/16/2018   Who referred you? neo chino     I was asked to see the patient regarding obesity by the referring provider above.    I had the pleasure of meeting with your patient Hilaria Bonner in our weight loss surgery office.  This patient is a 27 year old female who has been undergoing our thorough preoperative screening process in anticipation of potential bariatric surgery.    At initial evaluation we recorded Hilaria Bonner's height of 5' 5.748\", a weight of 315 lbs 6.4 oz, and calculated Body mass index of 51.3 kg/(m^2).      The patient has been unsuccessful with other methods of permanent weight loss and suffers from multiple weight related medical conditions.  Due to lack of success in achieving weight loss through other methods, she is interested in undergoing bariatric surgery. She has been on a mostly liquid diet for 2 meals and a TV dinner and has been walking.     She also quit smoking in February. She is on Topiramate  50 mg BID and does get some nausea from this.      PREVIOUS WEIGHT LOSS ATTEMPTS:     1/16/2018   I have tried the following methods to lose weight Watching portions or calories, Exercise, Atkins type diet (low carb/high protein), Pre packaged meals ex: Nutrisystem, Slimfast, OTC Medications       CO-MORBIDITIES OF OBESITY INCLUDE:     1/16/2018   I have the following co-morbidities associated with obesity: High Cholesterol, Weight Bearing Joint Pain       VITALS:  BP (!) 151/100  Pulse 80  Temp 98.6  F (37  C) (Oral)  Ht 1.67 m (5' 5.75\")  Wt 142.7 kg (314 lb 8 oz)  SpO2 99%  BMI 51.15 kg/m2    PE:  GENERAL: Alert and oriented x3. NAD  HEENT " exam: Sclerae not icteric. Hearing good. Head normocephalic and atraumatic.   CARDIOVASCULAR: No JVD  RESPIRATORY: Breathing unlabored  GASTROINTESTINAL: Obese  LOWER EXTREMITIES: no deformities  MUSCULOSKELETAL: Normal gait, Moves all 4 extremities equal and strong  NEUROLOGIC: no gross defect  SKIN: warm and dry to touch     In summary, she has undergone an appropriate medical evaluation, dietitian evaluation, as well as psychologic screening. The patient appears to be an appropriate candidate for bariatric surgery. She still has some weight to lose and will let us know when she accomplished this.     In the office today, I discussed the laparoscopic gastric sleeve surgery.  Risks, benefits and anticipated outcomes were outlined including the risk of death, staple line leak (1-2%), PE, DVT, ulcer, worsening GERD, N/V, stricture, hernia, wound infection, weight regain, and vitamin deficiencies. This patient has a good chance of sustaining permanent weight loss due to this procedure.  This should also allow improvement if not resolution of his/her weight related medical conditions.    At present we are going to present your patient's file for prior authorization to insurance.  Pending prior authorization, I anticipate a surgical date in the near future.  We will keep you updated on any progress.  If you have questions regarding care please feel free to contact me.  Total time spent in the clinic was 25 minutes with greater than 50% in face-to-face consultation.      Please route or send letter to:  Primary Care Provider (PCP) and Referring Provider    Again, thank you for allowing me to participate in the care of your patient.      Sincerely,    Luan Lopez MD

## 2018-06-28 ENCOUNTER — CARE COORDINATION (OUTPATIENT)
Dept: SURGERY | Facility: CLINIC | Age: 28
End: 2018-06-28

## 2018-06-28 ENCOUNTER — ALLIED HEALTH/NURSE VISIT (OUTPATIENT)
Dept: SURGERY | Facility: CLINIC | Age: 28
End: 2018-06-28
Payer: COMMERCIAL

## 2018-06-28 VITALS — BODY MASS INDEX: 50.99 KG/M2 | WEIGHT: 293 LBS

## 2018-06-28 NOTE — PROGRESS NOTES
"Tasklist updated.  Needs to focus on getting to goal weight.    Bariatric Task List  Status:  Is patient a candidate for bariatric surgery?:  Yes -     Cleared to schedule surgeon consult?:    -     Status:  surgery evaluation in process -     Surgeon: Dr Lopez  -     Tentative surgery month/year: July 2018, changed to when at goal weight -        Insurance: Insurance:  [facility] -     [employer]: PCP Recommendation and Medical Clearance:    -     HP Referral:    -     Other:    -        Patient Info: Initial Weight:  315 lb 6.4 oz -     Date of Initial Weight/Height:  11/24/2018 -     Goal Weight (lbs):  295 -     Required Weight Loss:  20 -     Surgery Type:  sleeve gastrectomy -        Dietician Visits: Structured weight loss required by insurance?:  Yes -     Dietician Visit 1:  Completed - 1/24/18 (BS)    Dietician Visit 2:  Completed - 2/27/18 MV   Dietician Visit 3:  Completed - 3/28/18   Dietician Visit 4:  Completed -     Dietician Visit 5:  Completed - 5/31/18: Completed - ND   Dietician Visit 6:  Completed - 6/28/18 in [facility]. bks      Psychological Evaluation: Psych eval:  Completed - 6/1/18 Dr Emreson - cleared      Lab Work: Complete Blood Count:  Completed -     Comprehensive Metabolic Panel:  Completed -     Vitamin D:  Completed -     Hgb A1c:  Completed -     PTH:  Completed -     Nicotine Testing:  Completed - 3/28/18 negative.   Other:    -        Consults/ Clearance: Medical Weight Management: Completed - start topiramate 1/24/18      PCP: Establish care with PCP:  Completed -     Follow up with PCP:    -     PCP letter of support:  Completed - in Epic date 1/25/18 Terri Cody      Smoking: Quit tobacco use (3 months smoke free)?:  Completed -     Quit date:  1/17/2018 -        Patient Education:  Information Session:  Completed -     Given \"Making your decision\" handout?:  Yes -     Given support group information?:  Yes -     Attended support group?:    -     Support plan in place?:  Completed -  "    Research consents signed?:  Yes -        Final Tasks:  Before surgery online class:  Needed -     Before surgery online class website link:  [email or web address]   After surgery online class:  Needed -     After surgery online class website link:  [email or web address]   Nurse visit for weigh-in and information:  Needed -     Pre-assessment clinic visit with anesthesia team for H&P:  Needed -     Final labs (Hgb, plt, T&S, UA):  Needed -        Notes:   -

## 2018-06-28 NOTE — PATIENT INSTRUCTIONS
GOALS:  - Eat 3 meals/day. Continue Modified Liquid Diet.   - Avoid snacking in between meals. If hungry, have non-starchy vegetables or fruit in between meals.   - Avoid eating before bedtime (by 8 PM)  - Eat slowly (20-30 minutes per meal), chewing foods well (25 chews per bite)  - Eliminate calorie-containing beverages (e.g. Crystal light, slices of fruit water)   - Walking or biking 5-7 days per week, 1-2 miles(45 minutes)  - Separate beverages form meals, no drinking 30 minutes before, during or after meals  - Start 5,000 International Units vitamin D3 daily.    Follow up with RD in one month    Susan Stephens RD, LD  If you need to schedule or reschedule with a dietitian please call [phone].

## 2018-06-28 NOTE — MR AVS SNAPSHOT
MRN:[id number]                      After Visit Summary   6/28/2018    Hilaria Bonner    MRN: [id number]           Visit Information        Provider Department      6/28/2018 10:00 AM Susan Stephens RD [facility]        Care Instructions    GOALS:  - Eat 3 meals/day. Continue Modified Liquid Diet.   - Avoid snacking in between meals. If hungry, have non-starchy vegetables or fruit in between meals.   - Avoid eating before bedtime (by 8 PM)  - Eat slowly (20-30 minutes per meal), chewing foods well (25 chews per bite)  - Eliminate calorie-containing beverages (e.g. Crystal light, slices of fruit water)   - Walking or biking 5-7 days per week, 1-2 miles(45 minutes)  - Separate beverages form meals, no drinking 30 minutes before, during or after meals  - Start 5,000 International Units vitamin D3 daily.    Follow up with RD in one month    Susan Stephens RD, SUREKHA  If you need to schedule or reschedule with a dietitian please call [phone].           [employer] Information     [employer] gives you secure access to your electronic health record. If you see a primary care provider, you can also send messages to your care team and make appointments. If you have questions, please call your primary care clinic.  If you do not have a primary care provider, please call [phone] and they will assist you.      [employer] is an electronic gateway that provides easy, online access to your medical records. With [employer], you can request a clinic appointment, read your test results, renew a prescription or communicate with your care team.     To access your existing account, please contact your [facility] or call [phone] for assistance.        Care EveryWhere ID     This is your Care EveryWhere ID. This could be used by other organizations to access your [facility] medical records  [id number]        Equal Access to Services     JEREMÍAS ALMARAZ: Danielito bishop  shi Katz, cortney renae, shante watson, laura aparicio. [facility] [phone].    ATENCIÓN: Si habla español, tiene a elizabeth disposición servicios gratuitos de asistencia lingüística. Llame al [phone].    We comply with applicable federal civil rights laws and [place] laws. We do not discriminate on the basis of race, color, national origin, age, disability, sex, sexual orientation, or gender identity.

## 2018-06-28 NOTE — PROGRESS NOTES
"Bariatric Nutrition Consult  Reason For Visit: Nutrition Reassessment bariatric surgery    Hilaria Bonner is a 27 year old presenting today for a follow-up bariatric nutrition consult. Pt is interested in laparoscopic sleeve gastrectomy with Dr. Lopez expected surgery in July 2018. This is pt's 6th of 6 required nutrition visits prior to surgery. Pt referred by JEAN Sterling PA-C (5/31/18).     Anthropometrics:  Height: 5' 5.75\"  Initial weight: 315.4 lbs   BMI: 51.3 kg/m^2  Current weight: 313.5 lbs (-0.8 lbs in the past month, -1.9 lbs since initial)     Required weight loss goal pre-op: -20.4 lbs from initial weight; goal weight of 295 lbs.     Current Medications/Supplements: *Started toparimate (1/24/18) with GUY Liu.   Vitamin D 50,000 IU once per week, Prenatal Multivitamin, Biotin, apple cider vinegar, garlic, ginko b, Hair/skin/nail vitamin    Progress with Previous Goals:  - Eat 3 meals/day. Continue Modified Liquid Diet. Patient reported that she was \"cheating\" over the past month due to her kids being in the hospital.  - Avoid snacking in between meals. If hungry, have non-starchy vegetables or fruit in between meals.  Snacking but limiting now  - Avoid eating before bedtime (by 8 PM) - met/continues  - Eat slowly (20-30 minutes per meal), chewing foods well (25 chews per bite) - met  - Eliminate calorie-containing beverages (e.g. Crystal light, slices of fruit water) - met/continues  - Walking or biking 5-7 days per week, 1-2 miles(45 minutes) - limited activity over the past month, planning on getting bike this week  - Separate beverages form meals, no drinking 30 minutes before, during or after meals - met/continues  - Start 5,000 International Units vitamin D3 daily. - 6,000 IU per day(for the past month)    (5/31/18)*Pt reports \"vomiting\" with heavy foods. When this writer questioned pt further about symptoms what she described sounded more like reflux than actual vomiting.     Nutrition " Diagnosis  Obesity r/t long history of self-monitoring deficit and excessive energy intake aeb BMI >30. - Continues.    Intervention  Intervention Provided/Education Provided: Reviewed previous goals and praised patient on progress with goals.  Patient reported that she cheated on the modified liquid diet a few times over the past month due to her kids being sick.  She asked about starting a ketogenic diet or eating solid foods, she stated that she feels the effect of toparimate more with solid foods.  Discussed product available in clinic to use as meal replacements, patient was interested in using soup and protein bar.  Encouraged continuing to use protein shake for breakfast, soup/salad or frozen meal replacement for lunch and dinner.  Patient reported that she plans to get a bike or roller blades for exercise this coming month. Provided pt with list of goals and RD contact information.      Patient Understanding: Good   Expected Compliance: Good     GOALS:  - Eat 3 meals/day. Continue Modified Liquid Diet.   - Avoid snacking in between meals. If hungry, have non-starchy vegetables or fruit in between meals.   - Avoid eating before bedtime (by 8 PM)  - Eat slowly (20-30 minutes per meal), chewing foods well (25 chews per bite)  - Eliminate calorie-containing beverages (e.g. Crystal light, slices of fruit water)   - Walking or biking 5-7 days per week, 1-2 miles(45 minutes)  - Separate beverages form meals, no drinking 30 minutes before, during or after meals  - Start 5,000 International Units vitamin D3 daily.     Follow-Up: 1 month or PRN    Time spent with patient: 30 minutes.    Susan Stephens, RD, LD

## 2018-07-25 ENCOUNTER — ALLIED HEALTH/NURSE VISIT (OUTPATIENT)
Dept: SURGERY | Facility: CLINIC | Age: 28
End: 2018-07-25
Payer: COMMERCIAL

## 2018-07-25 VITALS — WEIGHT: 293 LBS | BODY MASS INDEX: 51.99 KG/M2

## 2018-07-25 NOTE — PROGRESS NOTES
"Bariatric Nutrition Consult  Reason For Visit: Nutrition Reassessment bariatric surgery    Hilaria Bonner is a 27 year old presenting today for a follow-up bariatric nutrition consult. Pt is interested in laparoscopic sleeve gastrectomy with Dr. Lopez expected surgery in July 2018. Patient has completed all required nutrition visits prior to surgery. Pt referred by JEAN Sterling PA-C (5/31/18).     Anthropometrics:  Height: 5' 5.75\"  Initial weight: 315.4 lbs   BMI: 51.3 kg/m^2  Current weight: 319.7 lbs (+6.2 lbs in the past month, +4.3 lbs since initial)     Required weight loss goal pre-op: -20.4 lbs from initial weight; goal weight of 295 lbs.     Current Medications/Supplements: *Started toparimate (1/24/18) with GUY Liu.   Vitamin D 50,000 IU once per week, Prenatal Multivitamin, Biotin, apple cider vinegar, garlic, ginko b, Hair/skin/nail vitamin    Progress with Previous Goals:  - Eat 3 meals/day. Continue Modified Liquid Diet. -only using one shake per day recently   - Avoid snacking in between meals. If hungry, have non-starchy vegetables or fruit in between meals. - \"cheating\" with fruit watermelon or pineapple 2-3 times per week  - Avoid eating before bedtime (by 8 PM) - met/continues  - Eat slowly (20-30 minutes per meal), chewing foods well (25 chews per bite) continues  - Eliminate calorie-containing beverages (e.g. Crystal light, slices of fruit water) - some juice occasionally a pop  - Walking or biking 5-7 days per week, 1-2 miles(45 minutes) - not exercising this past month(bills to pay for)  - Separate beverages form meals, no drinking 30 minutes before, during or after meals - met/continues  - Start 5,000 International Units vitamin D3 daily. Continues 6,000 international units daily      (5/31/18)*Pt reports \"vomiting\" with heavy foods. Whe questioned pt further about symptoms what she described sounded more like reflux than actual vomiting.     Nutrition Diagnosis  Obesity r/t long " history of self-monitoring deficit and excessive energy intake aeb BMI >30. - Continues.    Intervention  Intervention Provided/Education Provided: Reviewed previous goals.  Patient stated she plans to get a membership at [employer] this week and is going to try and go 5 days per week after dropping her son off at .  She noted that calorie containing beverages have snuck back in recently and plans to stop drinking those this coming month.  Patient also expressed desire to start using two protein shakes per day again with month.  Discussed portion control especially with high calorie additions to salads(dressing and cheese).  Provided pt with list of goals and RD contact information.      Patient Understanding: Good   Expected Compliance: Good     GOALS:  - Eat 3 meals/day. Continue Modified Liquid Diet.   - Avoid snacking in between meals. If hungry, have non-starchy vegetables or fruit in between meals.   - Avoid eating before bedtime (by 8 PM)  - Eat slowly (20-30 minutes per meal), chewing foods well (25 chews per bite)  - Eliminate calorie-containing beverages (e.g. Crystal light, slices of fruit water)   - Walking or biking 5-7 days per week, 1-2 miles(45 minutes)  - Separate beverages form meals, no drinking 30 minutes before, during or after meals  - Start 5,000 International Units vitamin D3 daily.     Follow-Up: 1 month or PRN    Time spent with patient: 30 minutes.    Susan Stephens RD, LD

## 2018-07-26 ENCOUNTER — TELEPHONE (OUTPATIENT)
Dept: SURGERY | Facility: CLINIC | Age: 28
End: 2018-07-26

## 2018-07-26 NOTE — TELEPHONE ENCOUNTER
----- Message from Joanne Sterling PA-C sent at 6/20/2018  9:17 AM CDT -----  Regarding: FW: discuss kwabena/Dr SIMS?  Shelby, can you please call pt to see if the omeprazole is helping or if she is still having GERD or nausea??      ----- Message -----     From: Antoni Moody     Sent: 6/19/2018   2:02 PM       To: Joanne Sterling PA-C  Subject: discuss w/Dr SIMS?                                  Joanne, You mentioned in your note that the wt med that patient was on was causing nausea, started omeprazole, side effect, Dr. Lopez's note does not mention anything, do you need to talk to him about her concerns?

## 2018-07-26 NOTE — TELEPHONE ENCOUNTER
Left message patient to call coordinator to discuss how she is tolerating her weight loss medication and to schedule pre bariatric surgery appointment with Joanne Sterling PA-C to discuss weight loss medication.  Weight up from consultation weight.

## 2018-07-27 ENCOUNTER — CARE COORDINATION (OUTPATIENT)
Dept: SURGERY | Facility: CLINIC | Age: 28
End: 2018-07-27

## 2018-07-27 NOTE — PROGRESS NOTES
"Session Length: 20 minutes Session #: 1   Patient referred by self for Intrinsic (Health) Coaching.    DATA  1/24/18 weight at initial consult = 315 lbs  Pre bariatric goal weight = 295 lbs  Recent weight, 7/25/18 = 319.7 lbs    Treatment Objective(s) Addressed in this Session:  _x__Weight management    Reviewed intent of health coaching sessions to help patient think clearer about goals and reaching them.    Current Stressors/Issues:  1. Staying focused.    What patient does well:  1. Persistence, started process of preparing for weight loss surgery on 1/24/18, has completed all tasks except for getting to goal weight and still wants to work on that weight loss goal.    Previous Successes:  1. Completion of other pre-surgery tasks.    Plan/goals for this week:  Weight Management   1.  Continue to meet with dietician, 8/29/18 with Susan Stephens RD.   -Check weight routinely to monitor progress.   -On a modified liquid diet and using protein supplements.  3. Start a food log.   -Has a calendar book to use.   -To record intake.    -To record exercise.  4. Continue topiramate, it is helping with craving and portions. \"I am eating less\".   -States reflux not as much with eating less.   -Offered appointment with Adelfo Sterling PA-C to discuss weight loss medication - patient did not want to make appt at this time.  5. Work out at [employer].   -To set up membership today.   -To see if  appointment can be set up.  6. To continue health coaching sessions.   -Patient to call coordinator within 1-2 weeks.    Interventions:  __x___Motivational Interviewing, MI Intervention: Expressed empathy/understanding, supported autonomy, collaboration, evocation, permission to raise concerns or advise, open-ended questions. Reflections: simple and complex, Change talk (evoked), and reframe.  __x___Change Talk expressed by the Patient: Desire to change, ability to change, reasons to change, commitment to change, " activation, taking steps.  __x___Provider Response to Change Talk: E- Evoked more info from patient about behavior change, A-Affirmed patient s thoughts, decisions, or attempts at behavior change. R- Reflected patient s change talk and S - Summarized patient s change talk statements.  Stages of Change ([place])  Identify client's stage of change...  ___Precontemplation. (Not Ready) Do not intend to take action within the next 6 months. Uninformed or under informed. Resistant, unmotivated, or unready. Pros outweight cons. Temptation to engage in problem behavior is greater than self-efficacy to abstain.    ___Contemplation. (Getting Ready) Intend to take action within the next 6 months. More aware of pros and cons. Pros and cons of equal weight. Ambivalence. Chronic contemplation, or behavioral procrastination. Temptation to engage in problem behavior is greater than self-efficacy to abstain.    _x__Preparation. (Ready) Intend to take action within the next month. Already have taken action within the year. Have a plan of action. Recruit for action-oriented program. Pros for changing outweigh cons. Disparity between temptation and self -efficacy closes and leads to action stage.    _x__Action.  Have made noticeable lifestyle changes within the past 6 months. Action needs to be sufficient to reduce the risk of disease. Pros for changing outweigh cons.    ___Maintenance. Preventing relapse. Lasts 6 months to about 5 years. Not changing as much as in the Action Stage. Less tempted to relapse. More confident in continuing their change. Pros for changing outweigh cons. Relapse can occur when temptation greater than self-efficacy to maintain desired behavior.    The Transtheoretical Model (Prochaska & DiClemente, 1983; Prochaska, DiClemente & Norcorss, 1992)  Bandura's self efficacy theory (Bandura, 1977, 1982)

## 2018-12-13 ENCOUNTER — TELEPHONE (OUTPATIENT)
Dept: SURGERY | Facility: CLINIC | Age: 28
End: 2018-12-13

## 2018-12-13 NOTE — TELEPHONE ENCOUNTER
Patient still interested in weight loss surgery.  Started her 6 RD visits last January.  Needs to see surgeon in December or will need to redo the 6 RD visits.  Has lost weight since her last July visit.  Rest of tasks done.    Plan:  Instructed to call Antoni for visit with surgeon,  Dietician, anesthesia team and OR date.    To watch the online classes, [facility] message sent with reminder.

## 2018-12-20 ENCOUNTER — TELEPHONE (OUTPATIENT)
Dept: GASTROENTEROLOGY | Facility: CLINIC | Age: 28
End: 2018-12-20

## 2018-12-20 NOTE — TELEPHONE ENCOUNTER
Attempted to reach patient in regards to scheduling same day appointment for 12/20/18 at 1:30pm per Yessy Emerson. Unable to reach patient.

## 2018-12-27 ENCOUNTER — OFFICE VISIT (OUTPATIENT)
Dept: SURGERY | Facility: CLINIC | Age: 28
End: 2018-12-27
Payer: COMMERCIAL

## 2018-12-27 VITALS
BODY MASS INDEX: 47.09 KG/M2 | DIASTOLIC BLOOD PRESSURE: 97 MMHG | HEART RATE: 83 BPM | SYSTOLIC BLOOD PRESSURE: 128 MMHG | WEIGHT: 293 LBS | HEIGHT: 66 IN | TEMPERATURE: 98.4 F | OXYGEN SATURATION: 97 %

## 2018-12-27 DIAGNOSIS — E66.01 MORBID OBESITY (H): Primary | ICD-10-CM

## 2018-12-27 ASSESSMENT — MIFFLIN-ST. JEOR: SCORE: 2118.06

## 2018-12-27 NOTE — NURSING NOTE
This patient is having sleeve gastrectomy by Dr. Lopez.     Questions were addressed and understanding of content was verbalized.  Contact information was provided.    Patient goal weight: 295lbs  Weight today: 303lbs    Call Antoni at [phone] for scheduling.

## 2018-12-27 NOTE — NURSING NOTE
"(   Chief Complaint   Patient presents with     RECHECK     PBS, RN pls do preop teaching    )    ( Weight: 137.5 kg (303 lb 3.2 oz) )  ( Height: 167 cm (5' 5.75\") )  ( BMI (Calculated): 49.42 )  ( Initial Weight: 143.1 kg (315 lb 6.4 oz) )  ( Cumulative weight loss (lbs): 12.2 )  ( Last Visits Weight: 142.7 kg (314 lb 8 oz) )  ( Wt change since last visit (lbs): -11.3 )  (   )  (   )    ( BP: (!) 128/97 )  (   )  ( Temp: 98.4  F (36.9  C) )  ( Temp src: Oral )  ( Pulse: 83 )  (   )  ( SpO2: 97 % )    (   Patient Active Problem List   Diagnosis     Morbid obesity (H)     Vitamin D deficiency     Elevated parathyroid hormone     Encounter for smoking cessation counseling     Menorrhagia with irregular cycle    )  (   Current Outpatient Medications   Medication Sig Dispense Refill     Multiple Vitamins-Minerals (WOMENS MULTI VITAMIN & MINERAL PO)        topiramate (TOPAMAX) 25 MG tablet Take 3 tablets (75 mg) by mouth 2 times daily 180 tablet 3     VITAMIN D, ERGOCALCIFEROL, PO Take 3,000 Units by mouth 3 times daily       norethindrone-ethinyl estradiol (MICROGESTIN 1/20) 1-20 MG-MCG per tablet Take 1 tablet by mouth daily (Patient not taking: Reported on 12/27/2018) 63 tablet 4     omeprazole (PRILOSEC) 20 MG CR capsule Take 1 capsule (20 mg) by mouth daily (Patient not taking: Reported on 12/27/2018) 30 capsule 3     ondansetron (ZOFRAN ODT) 4 MG ODT tab Take 1-2 tablets (4-8 mg) by mouth every 6 hours as needed for nausea (Patient not taking: Reported on 12/27/2018) 20 tablet 1     varenicline (CHANTIX) 1 MG tablet Take 1 tablet (1 mg) by mouth 2 times daily (Patient not taking: Reported on 12/27/2018) 56 tablet 2    )  ( Diabetes Eval:    )    ( Pain Eval:  Data Unavailable )    ( Wound Eval:       )    (   History   Smoking Status     Former Smoker     Years: 1.00     Start date: 11/20/2016     Quit date: 1/12/2018   Smokeless Tobacco     Former User     Quit date: 1/12/2018    )    ( Signed By:  Rosa" John; December 27, 2018; 11:42 AM )

## 2018-12-27 NOTE — PROGRESS NOTES
"      Referring provider:       1/16/2018   Who referred you? neo chino     I was asked to see the patient regarding obesity by the referring provider above.    I had the pleasure of meeting with your patient Hilaria Bonner in our weight loss surgery office.  This patient is a 28 year old female who has been undergoing our thorough preoperative screening process in anticipation of potential bariatric surgery.    At initial evaluation we recorded Hilaria Bonner's height of 5' 5.748\", a weight of 315 lbs 6.4 oz, and calculated Body mass index of 51.3 kg/(m^2).      The patient has been unsuccessful with other methods of permanent weight loss and suffers from multiple weight related medical conditions.  Due to lack of success in achieving weight loss through other methods, she is interested in undergoing bariatric surgery.    Ms Bonner was in the process of preparing for sleeve gastrectomy earlier this year, but was unable to follow through on the weight loss due to busy lifestyle. She has since gotten back on track. She is working with dieticiians, and is 8 pounds from requirement. She still needs to have her psych eval updated.    PREVIOUS WEIGHT LOSS ATTEMPTS:     1/16/2018   I have tried the following methods to lose weight Watching portions or calories, Exercise, Atkins type diet (low carb/high protein), Pre packaged meals ex: Nutrisystem, Slimfast, OTC Medications       CO-MORBIDITIES OF OBESITY INCLUDE:     1/16/2018   I have the following co-morbidities associated with obesity: High Cholesterol, Weight Bearing Joint Pain       VITALS:  BP (!) 128/97   Pulse 83   Temp 98.4  F (36.9  C) (Oral)   Ht 1.67 m (5' 5.75\")   Wt 137.5 kg (303 lb 3.2 oz)   SpO2 97%   BMI 49.31 kg/m      PE:  GENERAL: Alert and oriented x3. NAD  HEENT exam: Sclerae not icteric. Hearing good. Head normocephalic and atraumatic.   CARDIOVASCULAR: No JVD  RESPIRATORY: Breathing unlabored  GASTROINTESTINAL: Obese  LOWER " EXTREMITIES: no deformities  MUSCULOSKELETAL: Normal gait, Moves all 4 extremities equal and strong  NEUROLOGIC: no gross defect  SKIN: warm and dry to touch     In summary, she has undergone an appropriate medical evaluation, dietitian evaluation, as well as psychologic screening. The patient appears to be an appropriate candidate for bariatric surgery.    In the office today, I discussed the laparoscopic gastric sleeve surgery.  Risks, benefits and anticipated outcomes were outlined including the risk of death, staple line leak (1-2%), PE, DVT, ulcer, worsening GERD, N/V, stricture, hernia, wound infection, weight regain, and vitamin deficiencies. This patient has a good chance of sustaining permanent weight loss due to this procedure.  This should also allow improvement if not resolution of his/her weight related medical conditions.    More specific risks related to vertical sleeve gastrectomy were detailed at the bariatric informational seminar and include the followin.) leak at the vertical sleeve staple line, 2.) stricture in the sleeve, 3.) nausea, vomiting, and dehydration for several months, 4.) adhesions causing bowel obstruction, 5.) rapid weight loss causing a higher rate of gallstone formation during the first 6 months after surgery, 6.) decreased absorption of vitamins because of the reduced stomach size, 7.) weight regain if inappropriate food intake occurs.    At present we are going to present your patient's file for prior authorization to insurance.  Pending prior authorization, I anticipate a surgical date in the near future.  We will keep you updated on any progress.  If you have questions regarding care please feel free to contact me.  Total time spent in the clinic was 30 minutes with greater than 50% in face-to-face consultation.    PAC order placed  Pt to seek psych eval update and complete preop wt loss prior to scheduling      Physician Attestation  ILuan, saw and  evaluated this patient as part of a shared visit.  I have reviewed and discussed with the advanced practice provider and/or resident their history, physical and plan.    I personally reviewed the vital signs, medications, labs and imaging.    My key history or physical exam findings: has class III obesity; will undergo sleeve gastrectomy; risks described in detail.    Key management decisions made by me: as noted.    Luan Lopez MD  Date of Service (when I saw the patient): 12/27/2018

## 2018-12-27 NOTE — LETTER
12/27/2018       RE: Hilaria Bonner  [address]  [place] [address]     Dear Colleague,    Thank you for referring your patient, Hilaria Bonner, to the [facility] SURGICAL WEIGHT MANAGEMENT at [facility]. Please see a copy of my visit note below.    Nutrition Education Consult:  Nutrition education regarding clear and low-fat full liquid diet for post-bariatric surgery (SG).  Patient referred by Dr Lopez.  Diet History:  Pt reports no history of receiving clear and low-fat full liquid diet education after bariatric surgery in the past.  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  Interventions:  Provided instruction on bariatric clear and low-fat full liquid diets.  Provided the following handouts: Diet Guidelines for Bariatric Surgery, Sources of Protein, Keeping Track of Your Fluids, list of recommended vitamin/mineral supplementation after SG surgery and RD contact information.  Patient Understanding: good  Expected Compliance: good    Goals:   1. Follow the bariatric post-op diet advancement schedule:  - Bariatric clear liquid diet through post-op day 1 (while in the hospital).  - Bariatric low-fat full liquid diet on post-op days 2 through 13 (2 weeks).  2. Sip on 48-64 oz (or greater) fluids daily, recording intake to help stay on-track.  - Drink at least 2 oz of fluid every 30 min.  Time: 45 minutes  Susan Stephens RD, LD

## 2018-12-27 NOTE — LETTER
"12/27/2018       RE: Hilaria Bonner  [address]  [place] [address]     Dear Colleague,    Thank you for referring your patient, Hilaria Bonner, to the [facility] SURGICAL WEIGHT MANAGEMENT at [facility]. Please see a copy of my visit note below.    Referring provider:       1/16/2018   Who referred you? neo chino     I was asked to see the patient regarding obesity by the referring provider above.    I had the pleasure of meeting with your patient Hilaria Bonner in our weight loss surgery office.  This patient is a 28 year old female who has been undergoing our thorough preoperative screening process in anticipation of potential bariatric surgery.    At initial evaluation we recorded Hilaria Bonner's height of 5' 5.748\", a weight of 315 lbs 6.4 oz, and calculated Body mass index of 51.3 kg/(m^2).      The patient has been unsuccessful with other methods of permanent weight loss and suffers from multiple weight related medical conditions.  Due to lack of success in achieving weight loss through other methods, she is interested in undergoing bariatric surgery.    Ms Bonner was in the process of preparing for sleeve gastrectomy earlier this year, but was unable to follow through on the weight loss due to busy lifestyle. She has since gotten back on track. She is working with dieticiians, and is 8 pounds from requirement. She still needs to have her psych eval updated.    PREVIOUS WEIGHT LOSS ATTEMPTS:     1/16/2018   I have tried the following methods to lose weight Watching portions or calories, Exercise, Atkins type diet (low carb/high protein), Pre packaged meals ex: Nutrisystem, Slimfast, OTC Medications       CO-MORBIDITIES OF OBESITY INCLUDE:     1/16/2018   I have the following co-morbidities associated with obesity: High Cholesterol, Weight Bearing Joint Pain       VITALS:  BP (!) 128/97   Pulse 83   Temp 98.4  F (36.9  C) (Oral)   Ht 1.67 m " "(5' 5.75\")   Wt 137.5 kg (303 lb 3.2 oz)   SpO2 97%   BMI 49.31 kg/m       PE:  GENERAL: Alert and oriented x3. NAD  HEENT exam: Sclerae not icteric. Hearing good. Head normocephalic and atraumatic.   CARDIOVASCULAR: No JVD  RESPIRATORY: Breathing unlabored  GASTROINTESTINAL: Obese  LOWER EXTREMITIES: no deformities  MUSCULOSKELETAL: Normal gait, Moves all 4 extremities equal and strong  NEUROLOGIC: no gross defect  SKIN: warm and dry to touch     In summary, she has undergone an appropriate medical evaluation, dietitian evaluation, as well as psychologic screening. The patient appears to be an appropriate candidate for bariatric surgery.    In the office today, I discussed the laparoscopic gastric sleeve surgery.  Risks, benefits and anticipated outcomes were outlined including the risk of death, staple line leak (1-2%), PE, DVT, ulcer, worsening GERD, N/V, stricture, hernia, wound infection, weight regain, and vitamin deficiencies. This patient has a good chance of sustaining permanent weight loss due to this procedure.  This should also allow improvement if not resolution of his/her weight related medical conditions.    More specific risks related to vertical sleeve gastrectomy were detailed at the bariatric informational seminar and include the followin.) leak at the vertical sleeve staple line, 2.) stricture in the sleeve, 3.) nausea, vomiting, and dehydration for several months, 4.) adhesions causing bowel obstruction, 5.) rapid weight loss causing a higher rate of gallstone formation during the first 6 months after surgery, 6.) decreased absorption of vitamins because of the reduced stomach size, 7.) weight regain if inappropriate food intake occurs.    At present we are going to present your patient's file for prior authorization to insurance.  Pending prior authorization, I anticipate a surgical date in the near future.  We will keep you updated on any progress.  If you have questions regarding care " please feel free to contact me.  Total time spent in the clinic was 30 minutes with greater than 50% in face-to-face consultation.    PAC order placed  Pt to seek psych eval update and complete preop wt loss prior to scheduling      Physician Attestation  I, Luan Lopez, saw and evaluated this patient as part of a shared visit.  I have reviewed and discussed with the advanced practice provider and/or resident their history, physical and plan.    I personally reviewed the vital signs, medications, labs and imaging.    My key history or physical exam findings: has class III obesity; will undergo sleeve gastrectomy; risks described in detail.    Key management decisions made by me: as noted.    Luan Lopez MD    Date of Service (when I saw the patient): 12/27/2018

## 2018-12-27 NOTE — PATIENT INSTRUCTIONS
It was a pleasure meeting with you today.     Thank you for allowing us the privilege of caring for you. We hope we provided you with the excellent service you deserve.     Please let us know if there is anything else we can do for you so that we can be sure you are leaving completely satisfied with your care experience.      You saw Dr. Lopez today.     Instructions per today's visit: Call Antoni at [phone] for scheduling.      To schedule appointments with our team, please call [phone] option #1    Please call during clinic hours Monday through Friday 8:00a - 4:00p if you have questions or you can contact us via [employer] at anytime.      Nurses: [phone]  Fax: [phone]  Surgery Scheduler: [phone]    Please call the hospital at [phone] to speak with our on call MDs if you have urgent needs after hours, during weekends, or holidays.    Please note if you need to go to the Emergency Room for any reason in the first 90 days after surgery it is important to go to the [facility] ER on the [address] on [facility] off of the [place] exit off of [address]    *For patients who are currently enrolled in our program and have not yet had weight loss surgery please note*:    You must be at your required goal weight at the time of your Anesthesia clinic visit as well as the day of surgery or your surgery will be canceled. You will not be able to obtain a new surgery date until you have met your goal weight.    Weight loss medications before and after surgery protocol:    Adipex (phentermine): Stop two days before surgery. Do not restart after surgery. May reconsider restarting as needed in the future.    Topimax (topiramate): Can take right up to surgery including morning of surgery and restart post op day #1.    Qsymia (phentermine/topiramate): Hold morning of surgery. Restart after surgery post op day #1    Contrave (bupropion/naltrxone): Fast taper before surgery. 1 tablet twice daily for 1  week and then discontinue 4 days before surgery.  Will reconsider restarting at postop appt once no longer taking narcotic pain meds.  Will slowly ramp up again.    Naltrexone: Stop 4 days before surgery.  Will reconsider starting again at postop appts when no longer taking narcotic pain meds.    Belviq (locaserin): Stop 2 days before surgery and restart immediately after surgery    Victoza(liraglutide)/Saxenda(liraglutide 3mg)/Trulicity (dulaglutide) (Any GLP-1): Can take up to surgery date and restart immediately after surgery.    Main follow-up parameters for all bariatric patients     Patients who have undergone Bariatric surgery:    1. Follow-up interval during the first year: ~1 week, 1 month, 3 month, 6 month,12 months.  Every 6 months until 5 years; and then annually thereafter.  The goal of follow-up sessions is to ensure that food intake behavior (choice of food and eating speed) and exercise/activity level are set appropriately.    2. Yearly lab tests ordered by our clinic.      3. The bariatric team should be aware and potentially evaluate all adverse gastrointestinal symptoms (dysphagia, abdominal pain, nausea, vomiting, diarrhea, heartburn, reflux, etc), which can be a sign of complication.  The bariatric surgeons perform general surgery procedures in addition to bariatric surgery (laparoscopic cholecystectomy, etc.)    4. Inability to tolerate textured food (chicken, steak, fish, eggs, beans) is NOT normal and may need to be evaluated by endoscopy performed by the bariatric surgeon.

## 2018-12-27 NOTE — PROGRESS NOTES
Nutrition Education Consult:  Nutrition education regarding clear and low-fat full liquid diet for post-bariatric surgery (SG).  Patient referred by Dr Lopez.  Diet History:  Pt reports no history of receiving clear and low-fat full liquid diet education after bariatric surgery in the past.  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  Interventions:  Provided instruction on bariatric clear and low-fat full liquid diets.  Provided the following handouts: Diet Guidelines for Bariatric Surgery, Sources of Protein, Keeping Track of Your Fluids, list of recommended vitamin/mineral supplementation after SG surgery and RD contact information.  Patient Understanding: good  Expected Compliance: good    Goals:   1. Follow the bariatric post-op diet advancement schedule:  - Bariatric clear liquid diet through post-op day 1 (while in the hospital).  - Bariatric low-fat full liquid diet on post-op days 2 through 13 (2 weeks).  2. Sip on 48-64 oz (or greater) fluids daily, recording intake to help stay on-track.  - Drink at least 2 oz of fluid every 30 min.  Time: 45 minutes  Susan Stephens RD, LD

## 2018-12-27 NOTE — PATIENT INSTRUCTIONS
Goals:   1. Follow the bariatric post-op diet advancement schedule:  - Bariatric clear liquid diet through post-op day 1 (while in the hospital).  - Bariatric low-fat full liquid diet on post-op days 2 through 13 (2 weeks).  2. Sip on 48-64 oz (or greater) fluids daily, recording intake to help stay on-track.  - Drink at least 2 oz of fluid every 30 min.      Susan Stephens RD, LD  If you need to schedule or reschedule with a dietitian please call [phone].

## 2019-01-08 ENCOUNTER — TELEPHONE (OUTPATIENT)
Dept: SURGERY | Facility: CLINIC | Age: 29
End: 2019-01-08

## 2019-01-08 NOTE — TELEPHONE ENCOUNTER
Unable to submit documentation for sleeve gastrectomy prior authorization until we have an updated to her psych eval (to be done within 3 months of the OR Date).  Instructed to call Dr Emerson's  to set up a follow-up appointment.

## 2019-01-11 ENCOUNTER — OFFICE VISIT (OUTPATIENT)
Dept: GASTROENTEROLOGY | Facility: CLINIC | Age: 29
End: 2019-01-11
Payer: COMMERCIAL

## 2019-01-11 VITALS — BODY MASS INDEX: 49.26 KG/M2 | WEIGHT: 293 LBS

## 2019-01-11 DIAGNOSIS — F54 PSYCHOLOGICAL FACTORS AFFECTING MEDICAL CONDITION: Primary | ICD-10-CM

## 2019-01-11 DIAGNOSIS — E66.01 MORBID OBESITY (H): ICD-10-CM

## 2019-01-11 ASSESSMENT — ANXIETY QUESTIONNAIRES
GAD7 TOTAL SCORE: 4
1. FEELING NERVOUS, ANXIOUS, OR ON EDGE: NOT AT ALL
5. BEING SO RESTLESS THAT IT IS HARD TO SIT STILL: NOT AT ALL
7. FEELING AFRAID AS IF SOMETHING AWFUL MIGHT HAPPEN: NOT AT ALL
4. TROUBLE RELAXING: NOT AT ALL
6. BECOMING EASILY ANNOYED OR IRRITABLE: MORE THAN HALF THE DAYS
2. NOT BEING ABLE TO STOP OR CONTROL WORRYING: SEVERAL DAYS
3. WORRYING TOO MUCH ABOUT DIFFERENT THINGS: SEVERAL DAYS

## 2019-01-11 ASSESSMENT — PATIENT HEALTH QUESTIONNAIRE - PHQ9
SUM OF ALL RESPONSES TO PHQ QUESTIONS 1-9: 3
SUM OF ALL RESPONSES TO PHQ QUESTIONS 1-9: 3
10. IF YOU CHECKED OFF ANY PROBLEMS, HOW DIFFICULT HAVE THESE PROBLEMS MADE IT FOR YOU TO DO YOUR WORK, TAKE CARE OF THINGS AT HOME, OR GET ALONG WITH OTHER PEOPLE: SOMEWHAT DIFFICULT

## 2019-01-11 NOTE — LETTER
1/11/2019       RE: Hilaria Bonner  [address]  [facility] [address]     Dear Colleague,    Thank you for referring your patient, Hilaria Bonner, to the [facility] at [facility]. Please see a copy of my visit note below.      Health Psychology                  Clinic    Department of Medicine  Alexa Fallon, PhD, LP [phone]                          Clinics and Surgery Center  [facility] Cortes Grant, PhD, LP [phone]                  3rd Floor  [place] [address]   Channing Martinez, PhD, ABPP, LP [phone]     [address] [facility],   [address],  Yessy Emerson,  PhD, LP [phone]            [place]  [address]  [place] [address] Michelle Early, PhD, LP [phone]     Confidential Summary of Standard Psychodiagnostic Evaluation*    Referral Source:  Luan Lopez MD    Reason for Referral:  Update preoperative bariatric surgery psychological evaluation - patient seen initially on 6/1/18 and cleared for surgery from a psychological perspective if all other goals set by weight loss team were met; updating today in order for her to have a psychological eval on file within 3 months of OR date    Sources of Information:  Information was obtained from a clinical interview with the patient, review of the [facility] medical record, and administration of psychological assessments.     History of Presenting Concerns:  Hilaria Bonner is a 28 year old female with morbid obesity considering laparoscopic sleeve gastrectomy with Dr. Lopez. The patient presented to the weight loss surgery program with an initial consult weight of 315.4# on 1/24/18 (BMI = 51.3 kg/m2).  Prior to weight loss surgery she has been asked to lose 20.4 pounds, or to reach a weight of 295 pounds.  Her current weight is 302.9#, which is approximately 8 lbs from goal weight. Prior to this, last recorded  weight was 319.7# on 7/25/18 and she has worked to lose this weight by recommitting to the weight management plan. She stated that she took time away from visits at the [facility] in order to recommit to this by increase physical activity, increasing water intake, eliminating high calorie foods and drinks, eating more vegetables, and eating TV dinners for portion control. She reported her recent typical diet is: breakfast includes 1 protein shake, no snacking between breakfast and lunch, lunch consists of vegetables (e.g. cucumbers or celery) but after her last nutrition visit she plans to add in 1 more protein shake, and a dinner that includes a TV dinner (e.g., SmartOnes, Lean Cuisines). 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 She is not using medications currently to assist in weight loss. She reported previously using topiramate, with last use in August due to wish to lose weight without medications. She currently is taking vitamin D and multivitamin daily.     Weight history was obtained in initial evaluation  Ms. Bonner noted problems with weight present since childhood.  She reported gaining weight with each of her 2 pregnancies and a progressive increase in weight over time.  She endorsed a history of obesity and gastric bypass in her mother, who had weight loss surgery 12 years ago in [place].  She stated she has lost a lot of weight and kept most of it off.  Her father was reported to have lost a lot of weight through lifestyle changes for management of diabetes.  She denied problems with obesity and her 2 biological brothers and sisters.  She stated that weight negatively impacts her ability to be active with her children, aged 2 and 9, makes it hard to spend a long  "time on her feet at work due to knee and ankle pain, and historically negatively impacted her body image, although she indicated learning to live herself at her current size approximately 1 year ago.  She denied current problems with body esteem.  She reported that factors that contributed to weight gain over time have included decreased activity due to knee problems, choosing to eat foods high in fat, fast foods, and chips, and eating large portions of food late at night.  Previous weight loss attempts were reported to be over-the-counter diet supplements such as Oxycut, and attempting to increase exercise and joining a gym.  In the past, she estimated average weight loss her weight loss attempt to be 5 pounds.  Factors that promoted weight loss were reported to be decreasing portion size, increasing physical activity, and changing food choices where she increased intake of fruits, vegetables, and decreased intake of meat.  She reported barriers to weight loss to be difficulty maintaining consistency and efforts and not seeing results with hard work.  She also reported that support by her mother and father regarding diet and exercise changes are facilitators her in weight loss currently.    Reviewed motivation for weight loss surgery; motivations remain the same as discussed in 6/1/18. She stated her reason for pursuing surgery is to improve her health, improve her ability to be active with her children, and reduce the risk of developing obesity-related medical problems such as diabetes given her family history.  She stated she has been interested in weight loss surgery for several years.  She indicated the desire to pursue surgery right now as she is \"sick\" of being overweight.  She reported she anticipates to lose 60-80 pounds through weight loss surgery and results depend on regular exercise, consuming high protein, and sticking with other diet recommendations.     She continued to appear knowledgeable about the " surgical procedure itself, stating that the surgery would remove a large portion of her stomach, with the remaining portion resembling a banana.  She appear knowledgeable about the risks and potential side effects of weight loss surgery, including potential of staple line leak, worsening acid reflux, the possibility of suboptimal weight loss, and general surgical risks including possible death, infection, and internal bleeding.  She appear knowledgeable about postoperative lifestyle changes including postoperative diet that transitions from clear liquids to puréed foods to soft foods, eating 3 small meals a day once returning to regular food, focusing on protein, taking regular multivitamins, continuing to exercise regularly, and follow-up with the clinic.  She indicated that these changes are very important for her to ensure that she implements long-term otherwise she is at risk for not having optimal weight loss. She reported that she witnessed her aunt return to eating normal foods/high calorie diet shortly after weight loss surgery, and as a result she developed a leak/tear and necessitated surgical intervention to repair this. This highlights the importance of following post-op recommendations for diet for Ms. Bonner.     Medical History:    Past Medical History:   Diagnosis Date     Earache or other ear, nose, or throat complaint 12/15    tyroid       Past Surgical History:   Procedure Laterality Date     GYN SURGERY      2 C-sections     KNEE SURGERY  most recently - [id number]    3 surgeries in [place]     ORTHOPEDIC SURGERY      2 knee meniscus surgery       Current Outpatient Medications   Medication     Multiple Vitamins-Minerals (WOMENS MULTI VITAMIN & MINERAL PO)     norethindrone-ethinyl estradiol (MICROGESTIN 1/20) 1-20 MG-MCG per tablet     omeprazole (PRILOSEC) 20 MG CR capsule     ondansetron (ZOFRAN ODT) 4 MG ODT tab     topiramate (TOPAMAX) 25 MG tablet     varenicline (CHANTIX) 1 MG tablet      "VITAMIN D, ERGOCALCIFEROL, PO     No current facility-administered medications for this visit.        Psychiatric History:  She reported no changes in psychiatric history since June 2018.  She endorsed mood as currently \"pretty happy\" and denied problems with anxiety, appetite, motivation, attention or concentration, or fatigue. She endorsed not obtaining as much sleep as she would like per night, and sleeps 5-6 hours per night. She attributes this waking early for work (shift is 6a-2p) and childcare responsibilities. She reported no difficulty falling asleep or maintaining sleep.     She endorsed a history of postpartum depression following her first pregnancy that lasted for 2 years between [id number].  She stated she went approximately 2 years untreated and was eventually prescribed an antidepressant from her OB/GYN provider.  She took this medication for approximately 2 months, but discontinued due to drowsiness.  She stated that support from her mother helped her resolve this depression.  She denied a history of depression prior to this or following this pregnancy.  She denied a history of past engagement in psychotherapy, psychiatric hospitalization, history or current thoughts of suicide, or history of self-directed violent behaviors or suicide attempt.  She indicated her family psychiatric history significant for her sister who she believes has bipolar disorder. She denied a history of binge eating, compensating for overeating through use of laxatives, diuretics, vomiting, or over exercising, or previous diagnosis or treatment of bulimia or anorexia.  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  She also indicated that through working with the programs dietitians, she has been able to regularly implement breakfast and " lunch is regular meals and no longer consumes the majority of her calories in the evening.     Substance Use History:  Regarding current substance use, she stated she drinks 1 glass of wine very 2 weeks, down from reporting she consumes alcohol 1-2 times per week and has 1-2 drinks on occasion in June 2018. She reported recognizing the importance of abstaining from alcohol following surgery, and stated she has no concerns abstaining following surgery.  She denied current or past use of recreational drugs.  She endorsed history of using tobacco products, quitting in February 2018 aided by Chantix prescribed by her primary care provider.  She denied a personal or family history of a substance use disorder.    Social History:  Mrs. Bonner stated she was born and raised in [place], growing up with her mother, father, 2 older brothers, and 2 older sisters.  She reported a close relationship with her mother.  She denied a history of abuse, neglect, or trauma during childhood.  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  She moved to [place] in December 2017. 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  She works the day shift (6a-2p). She also works as a  at a center in [place] and estimates she works 20 hours per week.   She currently lives with her parents in [facility] and her children, aged 2 and 9. She is looking to move out of her parents' home in Spring 2019, likely by April.  She has full custody of her children, and is currently  from the father who lives in [place].  She reported that they often talk with her father via video chat and she denied significant problems coparenting.  She described no major significant or anticipated stressors. She rated her current stress as a 3 on a 0-10 scale and reported that at times work can be stressful, but that she calms herself down well when stressed.   She endorsed coping with stress through listening to music, talking to her mother, or playing with her children.  She reported that her mother and father remain to be significant supports to her and preparing for weight loss surgery and are very supportive of her efforts to do this.  Her mother has a history of gastric bypass 12 years ago and her father has been implementing lifestyle changes to lose weight and manage diabetes.  She also stated her father quit tobacco products with her in support of her efforts.    Psychological Assessment:  The patient completed the Patient Health Questionnaire-9 (PHQ-9) and Generalized Anxiety Disorder-7 screener (MELODY-7) to reassess mood and anxiety. The [place] Multiphasic Personality Inventory-2 (MMPI-2) as not re-administered since it was given on 6/1/18.     Results on the WHODAS, PHQ-9, MELODY-7, CAGE-AID suggest minimal symptoms of anxiety and depression.    MELODY-7 SCORE 6/1/2018 1/11/2019   Total Score 0 (minimal anxiety) -   Total Score 0 4       PHQ-9 SCORE 6/1/2018 1/11/2019   PHQ-9 Total Score MyChart 2 (Minimal depression) 3 (Minimal depression)   PHQ-9 Total Score 2 3     On the MMPI-2 on 6/1/18, the patient generated a profile that was suggested a tendency to present themself in a positive light. There were no clinical elevations or evidence of depression, mike, or psychosis.  Despite some positive impression management, which is commonly seen in preoperative bariatric surgery psychological evaluations, there were no obvious contraindications to surgery in the patient s MMPI-2 profile.  Approximately 35 minutes was spent administering, scoring and interpreting the MMPI.      Mental Status Examination:  Appearance/Behavior/Orientation: Patient was on time, appropriately groomed and dressed, and demonstrated good eye contact. Alert and oriented to person, place, time, and situation. No evidence of psychomotor agitation.     Cooperation/Reliability: Patient was open and  cooperative throughout the interview.    Speech/Language: Speech was clear, coherent, and of normal rate, rhythm and volume.    Thought Form: Overall logical and organized.   Thought Content: Appropriate to interview and situation (e.g., appropriately focused on health and weight).  Judgment: Intact.    Mood/Affect: Mood euthymic; appropriate range of affect.    Insight/Motivation: Good insight into influence of emotions and behavior on weight. Motivation for bariatric surgery appears high.   Suicide/Assault: Patient denies suicidal or assaultive ideation, plan, or intent.    Impression:  Hilaria Bonner is a 28 year old female with morbid obesity considering laparoscopic sleeve gastrectomy. Impressions remain the same from June 2018 visit.  She presents with the capacity to provide informed consent.  She appeared to have a good understanding of the risks, potential complications, and postoperative responsibilities.  She appeared to have realistic expectations regarding weight loss, recovery from surgery, and postoperative lifestyle changes required for success following surgery.  She has positive social support through her parents, who have both made behavior change to support her preparation for weight loss with her, including adhering to diet choices consistent with weight loss surgery and her father's decision to quit tobacco products with her.  She has a history of postpartum depression between 2009 and 2011, reporting that after this episode of depression resolved she has not experienced depression since.  She does not have a previous history of depression prior to her first pregnancy.  She appears emotionally stable at present and denied a history of psychiatric hospitalization, thoughts of suicide, history of suicidal or self-directed violent behaviors, or suicide attempt.  She denied a history of disordered eating.  There does not appear to be concerns related to substance misuse.  She endorsed high  adherence to recommendations for weight loss prior to surgery.  She appears to have adequate coping stressors in this is a stable time for her to be pursuing surgery.  In total it appears that she is a good candidate for weight loss surgery from a psychological perspective.    Diagnosis:  Psychological factors affecting medical condition (F54)  Morbid obesity (E66.01)    Recommendation/Plan/Intervention:  Hilaria Bonner presents as a reasonably good candidate for bariatric surgery at this time and is recommended for weight loss surgery is she is able to meet other requirements set forth by weight loss surgery team, such as reach pre-operative weight loss goal.      Discussed importance of high protein consumption and provided motivational interviewing to increase intrinsic motivation to follow dieticians recommendation of adding protein into meals at lunch. It is recommended that she continue working with nutrition counseling to support her as she works towards her preoperative weight loss goal.  If she is continuing to experience significant barriers in reaching this goal, it is recommended that she consider following up with health psychology for behavioral modification and cognitive strategies to support weight loss efforts. No mental health follow-up required before surgery. The patient may follow-up with Health Psychology as needed to enhance preparedness for surgery or to discuss postoperative adjustment.    Yessy Emerson, PhD,   Clinical Health Psychologist  *In accordance with the Rules of the [place] Board of [employer], it is noted that psychological descriptions and scientific procedures underlying psychological evaluations have limitations.  Absolute predictions cannot be made based on information in this report.

## 2019-01-11 NOTE — PROGRESS NOTES
Health Psychology                  Clinic    Department of Medicine  Alexa Fallon, PhD, LP [phone]                          Clinics and Surgery Center  [facility] Cortes Grant, PhD, LP [phone]                  3rd Floor  [place] Mail Code 749   Channing Martinez, PhD, ABPP, LP [phone]     [address] [facility], [address],  Yessy Emerson,  PhD, LP [phone]            [place] [address] Michelle Early, PhD, LP [phone]     Confidential Summary of Standard Psychodiagnostic Evaluation*    Referral Source:  Luan Lopez MD    Reason for Referral:  Update preoperative bariatric surgery psychological evaluation - patient seen initially on 6/1/18 and cleared for surgery from a psychological perspective if all other goals set by weight loss team were met; updating today in order for her to have a psychological eval on file within 3 months of OR date    Sources of Information:  Information was obtained from a clinical interview with the patient, review of the [facility] medical record, and administration of psychological assessments.     History of Presenting Concerns:  Hilaria Bonner is a 28 year old female with morbid obesity considering laparoscopic sleeve gastrectomy with Dr. Lopez. The patient presented to the weight loss surgery program with an initial consult weight of 315.4# on 1/24/18 (BMI = 51.3 kg/m2).  Prior to weight loss surgery she has been asked to lose 20.4 pounds, or to reach a weight of 295 pounds.  Her current weight is 302.9#, which is approximately 8 lbs from goal weight. Prior to this, last recorded weight was 319.7# on 7/25/18 and she has worked to lose this weight by recommitting to the weight management plan. She stated that she took time away from visits at the [facility] in order to recommit to this by increase physical activity, increasing water intake, eliminating high calorie foods and drinks,  eating more vegetables, and eating TV dinners for portion control. She reported her recent typical diet is: breakfast includes 1 protein shake, no snacking between breakfast and lunch, lunch consists of vegetables (e.g. cucumbers or celery) but after her last nutrition visit she plans to add in 1 more protein shake, and a dinner that includes a TV dinner (e.g., SmartOnes, Lean Cuisines). 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 She is not using medications currently to assist in weight loss. She reported previously using topiramate, with last use in August due to wish to lose weight without medications. She currently is taking vitamin D and multivitamin daily.     Weight history was obtained in initial evaluation  Ms. Bonner noted problems with weight present since childhood.  She reported gaining weight with each of her 2 pregnancies and a progressive increase in weight over time.  She endorsed a history of obesity and gastric bypass in her mother, who had weight loss surgery 12 years ago in [place].  She stated she has lost a lot of weight and kept most of it off.  Her father was reported to have lost a lot of weight through lifestyle changes for management of diabetes.  She denied problems with obesity and her 2 biological brothers and sisters.  She stated that weight negatively impacts her ability to be active with her children, aged 2 and 9, makes it hard to spend a long time on her feet at work due to knee and ankle pain, and historically negatively impacted her body image, although she indicated learning to live herself at her current size approximately 1 year ago.  She denied current problems with body esteem.  She reported that factors that contributed to weight gain  "over time have included decreased activity due to knee problems, choosing to eat foods high in fat, fast foods, and chips, and eating large portions of food late at night.  Previous weight loss attempts were reported to be over-the-counter diet supplements such as Oxycut, and attempting to increase exercise and joining a gym.  In the past, she estimated average weight loss her weight loss attempt to be 5 pounds.  Factors that promoted weight loss were reported to be decreasing portion size, increasing physical activity, and changing food choices where she increased intake of fruits, vegetables, and decreased intake of meat.  She reported barriers to weight loss to be difficulty maintaining consistency and efforts and not seeing results with hard work.  She also reported that support by her mother and father regarding diet and exercise changes are facilitators her in weight loss currently.    Reviewed motivation for weight loss surgery; motivations remain the same as discussed in 6/1/18. She stated her reason for pursuing surgery is to improve her health, improve her ability to be active with her children, and reduce the risk of developing obesity-related medical problems such as diabetes given her family history.  She stated she has been interested in weight loss surgery for several years.  She indicated the desire to pursue surgery right now as she is \"sick\" of being overweight.  She reported she anticipates to lose 60-80 pounds through weight loss surgery and results depend on regular exercise, consuming high protein, and sticking with other diet recommendations.     She continued to appear knowledgeable about the surgical procedure itself, stating that the surgery would remove a large portion of her stomach, with the remaining portion resembling a banana.  She appear knowledgeable about the risks and potential side effects of weight loss surgery, including potential of staple line leak, worsening acid reflux, the " "possibility of suboptimal weight loss, and general surgical risks including possible death, infection, and internal bleeding.  She appear knowledgeable about postoperative lifestyle changes including postoperative diet that transitions from clear liquids to puréed foods to soft foods, eating 3 small meals a day once returning to regular food, focusing on protein, taking regular multivitamins, continuing to exercise regularly, and follow-up with the clinic.  She indicated that these changes are very important for her to ensure that she implements long-term otherwise she is at risk for not having optimal weight loss. She reported that she witnessed her aunt return to eating normal foods/high calorie diet shortly after weight loss surgery, and as a result she developed a leak/tear and necessitated surgical intervention to repair this. This highlights the importance of following post-op recommendations for diet for Ms. Bonner.     Medical History:    Past Medical History:   Diagnosis Date     Earache or other ear, nose, or throat complaint 12/15    tyroid       Past Surgical History:   Procedure Laterality Date     GYN SURGERY      2 C-sections     KNEE SURGERY  most recently - [phone]    3 surgeries in [place]     ORTHOPEDIC SURGERY      2 knee meniscus surgery       Current Outpatient Medications   Medication     Multiple Vitamins-Minerals (WOMENS MULTI VITAMIN & MINERAL PO)     norethindrone-ethinyl estradiol (MICROGESTIN 1/20) 1-20 MG-MCG per tablet     omeprazole (PRILOSEC) 20 MG CR capsule     ondansetron (ZOFRAN ODT) 4 MG ODT tab     topiramate (TOPAMAX) 25 MG tablet     varenicline (CHANTIX) 1 MG tablet     VITAMIN D, ERGOCALCIFEROL, PO     No current facility-administered medications for this visit.        Psychiatric History:  She reported no changes in psychiatric history since June 2018.  She endorsed mood as currently \"pretty happy\" and denied problems with anxiety, appetite, motivation, attention or " concentration, or fatigue. She endorsed not obtaining as much sleep as she would like per night, and sleeps 5-6 hours per night. She attributes this waking early for work (shift is 6a-2p) and childcare responsibilities. She reported no difficulty falling asleep or maintaining sleep.     She endorsed a history of postpartum depression following her first pregnancy that lasted for 2 years between [id number].  She stated she went approximately 2 years untreated and was eventually prescribed an antidepressant from her OB/GYN provider.  She took this medication for approximately 2 months, but discontinued due to drowsiness.  She stated that support from her mother helped her resolve this depression.  She denied a history of depression prior to this or following this pregnancy.  She denied a history of past engagement in psychotherapy, psychiatric hospitalization, history or current thoughts of suicide, or history of self-directed violent behaviors or suicide attempt.  She indicated her family psychiatric history significant for her sister who she believes has bipolar disorder. She denied a history of binge eating, compensating for overeating through use of laxatives, diuretics, vomiting, or over exercising, or previous diagnosis or treatment of bulimia or anorexia.  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  She also indicated that through working with the programs dietitians, she has been able to regularly implement breakfast and lunch is regular meals and no longer consumes the majority of her calories in the evening.     Substance Use History:  Regarding current substance use, she stated she drinks 1 glass of wine very 2 weeks, down from reporting she consumes alcohol 1-2 times per week and has 1-2 drinks on occasion in June  2018. She reported recognizing the importance of abstaining from alcohol following surgery, and stated she has no concerns abstaining following surgery.  She denied current or past use of recreational drugs.  She endorsed history of using tobacco products, quitting in February 2018 aided by Chantix prescribed by her primary care provider.  She denied a personal or family history of a substance use disorder.    Social History:  Mrs. Bonner stated she was born and raised in [place], growing up with her mother, father, 2 older brothers, and 2 older sisters.  She reported a close relationship with her mother.  She denied a history of abuse, neglect, or trauma during childhood.  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  She moved to [place] in December 2017. 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  She works the day shift (6a-2p). She also works as a  at a center in [place] and estimates she works 20 hours per week.   She currently lives with her parents in [facility] [place] [facility] and her children, aged 2 and 9. She is looking to move out of her parents' home in Spring 2019, likely by April.  She has full custody of her children, and is currently  from the father who lives in [place].  She reported that they often talk with her father via video chat and she denied significant problems coparenting.  She described no major significant or anticipated stressors. She rated her current stress as a 3 on a 0-10 scale and reported that at times work can be stressful, but that she calms herself down well when stressed.  She endorsed coping with stress through listening to music, talking to her mother, or playing with her children.  She reported that her mother and father remain to be significant supports to her and preparing for weight loss surgery and are very supportive of her efforts to do this.  Her mother has a  history of gastric bypass 12 years ago and her father has been implementing lifestyle changes to lose weight and manage diabetes.  She also stated her father quit tobacco products with her in support of her efforts.    Psychological Assessment:  The patient completed the Patient Health Questionnaire-9 (PHQ-9) and Generalized Anxiety Disorder-7 screener (MELODY-7) to reassess mood and anxiety. The [place] Multiphasic Personality Inventory-2 (MMPI-2) as not re-administered since it was given on 6/1/18.     Results on the WHODAS, PHQ-9, MELODY-7, CAGE-AID suggest minimal symptoms of anxiety and depression.    MELODY-7 SCORE 6/1/2018 1/11/2019   Total Score 0 (minimal anxiety) -   Total Score 0 4       PHQ-9 SCORE 6/1/2018 1/11/2019   PHQ-9 Total Score MyChart 2 (Minimal depression) 3 (Minimal depression)   PHQ-9 Total Score 2 3     On the MMPI-2 on 6/1/18, the patient generated a profile that was suggested a tendency to present themself in a positive light. There were no clinical elevations or evidence of depression, mike, or psychosis.  Despite some positive impression management, which is commonly seen in preoperative bariatric surgery psychological evaluations, there were no obvious contraindications to surgery in the patient s MMPI-2 profile.  Approximately 35 minutes was spent administering, scoring and interpreting the MMPI.      Mental Status Examination:  Appearance/Behavior/Orientation: Patient was on time, appropriately groomed and dressed, and demonstrated good eye contact. Alert and oriented to person, place, time, and situation. No evidence of psychomotor agitation.     Cooperation/Reliability: Patient was open and cooperative throughout the interview.    Speech/Language: Speech was clear, coherent, and of normal rate, rhythm and volume.    Thought Form: Overall logical and organized.   Thought Content: Appropriate to interview and situation (e.g., appropriately focused on health and weight).  Judgment: Intact.     Mood/Affect: Mood euthymic; appropriate range of affect.    Insight/Motivation: Good insight into influence of emotions and behavior on weight. Motivation for bariatric surgery appears high.   Suicide/Assault: Patient denies suicidal or assaultive ideation, plan, or intent.    Impression:  Hilaria Bonner is a 28 year old female with morbid obesity considering laparoscopic sleeve gastrectomy. Impressions remain the same from June 2018 visit.  She presents with the capacity to provide informed consent.  She appeared to have a good understanding of the risks, potential complications, and postoperative responsibilities.  She appeared to have realistic expectations regarding weight loss, recovery from surgery, and postoperative lifestyle changes required for success following surgery.  She has positive social support through her parents, who have both made behavior change to support her preparation for weight loss with her, including adhering to diet choices consistent with weight loss surgery and her father's decision to quit tobacco products with her.  She has a history of postpartum depression between 2009 and 2011, reporting that after this episode of depression resolved she has not experienced depression since.  She does not have a previous history of depression prior to her first pregnancy.  She appears emotionally stable at present and denied a history of psychiatric hospitalization, thoughts of suicide, history of suicidal or self-directed violent behaviors, or suicide attempt.  She denied a history of disordered eating.  There does not appear to be concerns related to substance misuse.  She endorsed high adherence to recommendations for weight loss prior to surgery.  She appears to have adequate coping stressors in this is a stable time for her to be pursuing surgery.  In total it appears that she is a good candidate for weight loss surgery from a psychological perspective.    Diagnosis:  Psychological  factors affecting medical condition (F54)  Morbid obesity (E66.01)    Recommendation/Plan/Intervention:  Hilaria Bonner presents as a reasonably good candidate for bariatric surgery at this time and is recommended for weight loss surgery is she is able to meet other requirements set forth by weight loss surgery team, such as reach pre-operative weight loss goal.      Discussed importance of high protein consumption and provided motivational interviewing to increase intrinsic motivation to follow dieticians recommendation of adding protein into meals at lunch. It is recommended that she continue working with nutrition counseling to support her as she works towards her preoperative weight loss goal.  If she is continuing to experience significant barriers in reaching this goal, it is recommended that she consider following up with health psychology for behavioral modification and cognitive strategies to support weight loss efforts. No mental health follow-up required before surgery. The patient may follow-up with Health Psychology as needed to enhance preparedness for surgery or to discuss postoperative adjustment.    Yessy Emerson, PhD,   Clinical Health Psychologist    *In accordance with the Rules of the [place] Board of [employer], it is noted that psychological descriptions and scientific procedures underlying psychological evaluations have limitations.  Absolute predictions cannot be made based on information in this report.

## 2019-01-12 ASSESSMENT — ANXIETY QUESTIONNAIRES: GAD7 TOTAL SCORE: 4

## 2019-01-12 ASSESSMENT — PATIENT HEALTH QUESTIONNAIRE - PHQ9: SUM OF ALL RESPONSES TO PHQ QUESTIONS 1-9: 3

## 2019-03-07 ENCOUNTER — OFFICE VISIT (OUTPATIENT)
Dept: SURGERY | Facility: CLINIC | Age: 29
End: 2019-03-07
Payer: COMMERCIAL

## 2019-03-07 ENCOUNTER — ANESTHESIA EVENT (OUTPATIENT)
Dept: SURGERY | Facility: CLINIC | Age: 29
End: 2019-03-07

## 2019-03-07 VITALS
RESPIRATION RATE: 18 BRPM | WEIGHT: 293 LBS | SYSTOLIC BLOOD PRESSURE: 149 MMHG | OXYGEN SATURATION: 99 % | HEART RATE: 86 BPM | HEIGHT: 66 IN | BODY MASS INDEX: 47.09 KG/M2 | DIASTOLIC BLOOD PRESSURE: 105 MMHG | TEMPERATURE: 98.1 F

## 2019-03-07 DIAGNOSIS — I10 ESSENTIAL HYPERTENSION: ICD-10-CM

## 2019-03-07 DIAGNOSIS — Z01.818 PRE-OPERATIVE GENERAL PHYSICAL EXAMINATION: Primary | ICD-10-CM

## 2019-03-07 RX ORDER — ACETAMINOPHEN 500 MG
1000 TABLET ORAL PRN
Status: ON HOLD | COMMUNITY
End: 2019-03-27

## 2019-03-07 ASSESSMENT — MIFFLIN-ST. JEOR: SCORE: 2103.57

## 2019-03-07 ASSESSMENT — LIFESTYLE VARIABLES: TOBACCO_USE: 1

## 2019-03-07 NOTE — ANESTHESIA PREPROCEDURE EVALUATION
Anesthesia Pre-Procedure Evaluation    Patient: Hilaria Bonner   MRN:     [id number] Gender:   female   Age:    28 year old :      1990        Preoperative Diagnosis: * No surgery found *        Past Medical History:   Diagnosis Date     Earache or other ear, nose, or throat complaint 12/15    tyroid      Past Surgical History:   Procedure Laterality Date     GYN SURGERY      2 C-sections     KNEE SURGERY  most recently - [phone]    3 surgeries in [place]     ORTHOPEDIC SURGERY      2 knee meniscus surgery          Anesthesia Evaluation     . Pt has had prior anesthetic. Type: Regional, MAC and General           ROS/MED HX    ENT/Pulmonary:     (+)KENDALL risk factors hypertension, obese, tobacco use, Past use 10 years of tobacco use < 1 PPD packs/day  , . .    Neurologic:     (+)migraines,     Cardiovascular:     (+) ----. : . . . :. . No previous cardiac testing       METS/Exercise Tolerance:  >4 METS   Hematologic:  - neg hematologic  ROS       Musculoskeletal:   (+) arthritis, , , other musculoskeletal- bilateral knee pain - S/P meniscus surgery      GI/Hepatic:  - neg GI/hepatic ROS       Renal/Genitourinary:  - ROS Renal section negative       Endo:         Psychiatric:  - neg psychiatric ROS       Infectious Disease:  - neg infectious disease ROS       Malignancy:      - no malignancy   Other:    (+) no H/O Chronic Pain,no other significant disability                        PHYSICAL EXAM:   Mental Status/Neuro: A/A/O   Airway: Facies: Feasible  Mallampati: I  Mouth/Opening: Full  TM distance: > 6 cm  Neck ROM: Full   Respiratory: Auscultation: CTAB     Resp. Rate: Normal     Resp. Effort: Normal      CV: Rhythm: Regular  Heart: Normal Sounds   Comments:      Dental: Normal                  Lab Results   Component Value Date    WBC 5.5 2018    HGB 15.3 2018    HCT 45.3 2018     2018     2018    POTASSIUM 3.9 2018    CHLORIDE 108 2018    CO2 23  "01/24/2018    BUN 10 01/24/2018    CR 0.70 01/24/2018    GLC 85 01/24/2018    QUINCY 8.4 (L) 01/24/2018    ALBUMIN 3.3 (L) 01/24/2018    PROTTOTAL 7.6 01/24/2018    ALT 18 01/24/2018    AST 21 01/24/2018    ALKPHOS 125 01/24/2018    BILITOTAL 0.5 01/24/2018    TSH 2.39 01/12/2018       Preop Vitals  BP Readings from Last 3 Encounters:   12/27/18 (!) 128/97   06/07/18 (!) 151/100   05/31/18 (!) 135/95    Pulse Readings from Last 3 Encounters:   12/27/18 83   06/07/18 80   05/31/18 85      Resp Readings from Last 3 Encounters:   04/27/18 26   12/06/16 14    SpO2 Readings from Last 3 Encounters:   12/27/18 97%   06/07/18 99%   05/31/18 96%      Temp Readings from Last 1 Encounters:   12/27/18 98.4  F (36.9  C) (Oral)    Ht Readings from Last 1 Encounters:   12/27/18 1.67 m (5' 5.75\")      Wt Readings from Last 1 Encounters:   01/11/19 137.4 kg (302 lb 14.4 oz)    Estimated body mass index is 49.26 kg/m  as calculated from the following:    Height as of 12/27/18: 1.67 m (5' 5.75\").    Weight as of 1/11/19: 137.4 kg (302 lb 14.4 oz).     LDA:            KVNG FV AN PLAN NO PONV RULE         PAC Discussion and Assessment    ASA Classification: 2  Case is suitable for:   Anesthetic techniques and relevant risks discussed: GA  Invasive monitoring and risk discussed: No  Types:   Possibility and Risk of blood transfusion discussed: No  NPO instructions given:   Additional anesthetic preparation and risks discussed:   Needs early admission to pre-op area:   Other:     PAC Resident/NP Anesthesia Assessment:  28 year old female for lap gastric sleeve, possible hiatal hernia repair, with Dr. Luan Lopez, on 3/26/19, in treatment of obesity. PAC referral for risk assessment and optimization for anesthesia.  Pre-operative considerations include:  1.) CV: Functional status independent with > 4 METS exercise tolerance. Cardiac risks of HLD, elevated BPs without diagnosis of HTN, smoking history.   RCRI = 1 (if HH repair is done) " reflecting 0.9% risk of major adverse cardiac event.  No further cardiac evaluation indicated  2.) Pulmonary: Hx smoking tobacco-recent prescription of Chantix.  No pulmonary diagnosis, dx or meds.  KENDALL risks of BMI 49, HTN = 2/8 = low risk  3.) Heme: No bleeding or clotting dysfunction  VTE risk is elevated due to BMI > 40  4.) GI: PONV score = 2 ( 2 or > antiemetic prophylaxis recommended.    Anesthesia:  No prior problems. Pt discussed with Dr. Chaves.         Reviewed and Signed by PAC Mid-Level Provider/Resident  Mid-Level Provider/Resident: Ericka Luciano PA-C  Date: 3/7/19  Time: 11:40 am    Attending Anesthesiologist Anesthesia Assessment:        Anesthesiologist:   Date:   Time:   Pass/Fail:   Disposition:     PAC Pharmacist Assessment:        Pharmacist:   Date:   Time:        GUY Garcia

## 2019-03-07 NOTE — H&P
Pre-Operative H & P     CC:  Preoperative exam to assess for increased cardiopulmonary risk while undergoing surgery and anesthesia.    Date of Encounter: 3/7/2019  Primary Care Physician:  Clinic, [facility]  Hilaria Bonner is a 28 year old female who presents for pre-operative H & P in preparation for  lap gastric sleeve, possible hiatal hernia repair, with Dr. Luan Lopez, on 3/26/19,  at [facility], in treatment of obesity.  She has co morbidities of elevated BPs, knee pain.    History is obtained from the patient.     Past Medical History  Past Medical History:   Diagnosis Date    Distant tobacco history-quit 2018      BMI 49 12/15    Intermittent high blood pressure without diagnosis of HTN       Past Surgical History  Past Surgical History:   Procedure Laterality Date     GYN SURGERY      2 C-sections     KNEE SURGERY  most recently - [id number]    3 surgeries in [place]     ORTHOPEDIC SURGERY      2 knee meniscus surgery       Hx of Blood transfusions/reactions: no     Hx of abnormal bleeding or anti-platelet use: no    Menstrual history: Patient's last menstrual period was 02/15/2019 (approximate).    Steroid use in the last year: no    Personal or FH with difficulty with Anesthesia:  no    Prior to Admission Medications  Current Outpatient Medications   Medication Sig Dispense Refill     acetaminophen (TYLENOL) 500 MG tablet Take 1,000 mg by mouth as needed for mild pain       BIOTIN PO Take 1 tablet by mouth every morning       Multiple Vitamins-Minerals (WOMENS MULTI VITAMIN & MINERAL PO) Take 1 tablet by mouth every morning        topiramate (TOPAMAX) 25 MG tablet Take 3 tablets (75 mg) by mouth 2 times daily 180 tablet 3     VITAMIN D, ERGOCALCIFEROL, PO Take 3,000 Units by mouth twice a week Morning         Allergies  No Known Allergies    Social History  Social History     Socioeconomic History     Marital status: Single     Number of  "children: 2 boys   Occupational History     CNA at a nursing home   Tobacco Use     Smoking status: Former Smoker     Years: 1.00     Start date: 2016     Last attempt to quit: 2018     Years since quittin.1     Smokeless tobacco: Former User     Quit date: 2018   Substance and Sexual Activity     Alcohol use: Yes     Alcohol/week: 0.0 oz     Comment: occasional     Drug use: No     Sexual activity: Yes     Partners: Male     Birth control/protection: Currently  Not sexually active x 1 year     Family History  Family History   Problem Relation Age of Onset     Diabetes Father      Hypertension Father      Breast Cancer Sister 26        surgery only     Cancer Sister      Coronary Artery Disease Other Age 60        paternal aunt     Breast Cancer Sister        Anesthesia Evaluation  Pt has had prior anesthetic. Type: Regional, MAC and General     ROS/MED HX    ENT/Pulmonary:     (+)KENDALL risk factors hypertension, obese,   tobacco use, Past use 10 years of tobacco use < 1 PPD -quit 1 year ago w.Chantix   Neurologic:     (+)migraines,     Cardiovascular:   Intermittent elevated BPS without dx of HTN  (+) No previous cardiac testing    No CUEVAS, Chest Pain, palpitations     METS/Exercise Tolerance:  >4 METS   Hematologic:  - neg hematologic  ROS       Musculoskeletal:   (+) arthritis, , , other musculoskeletal- bilateral knee pain - S/P meniscus surgery      GI/Hepatic:  - neg GI/hepatic ROS       Renal/Genitourinary:  - ROS Renal section negative       Endo:      negative   Psychiatric:  - neg psychiatric ROS       Infectious Disease:  - neg infectious disease ROS       Malignancy:      - no malignancy   Other:    (+) no H/O Chronic Pain,no other significant disability        The complete review of systems is negative other than noted in the HPI or here.   Temp: 98.1  F (36.7  C) Temp src: Oral BP: (!) 149/105 Pulse: 86   Resp: 18 SpO2: 99 %         300 lbs 0 oz  5' 5.75\"   Body mass index is 48.79 " kg/m .       Physical Exam  Constitutional: Awake, alert, cooperative, no apparent distress, and appears stated age.  Eyes: Pupils equal, round and reactive to light, extra ocular muscles intact, sclera clear, conjunctiva normal.  HENT: Normocephalic, oral pharynx with moist mucus membranes, good dentition. No goiter appreciated.   Respiratory: Distant and clear to auscultation bilaterally, no crackles or wheezing.  Cardiovascular: Regular rate and rhythm, normal S1 and S2, and no murmur noted.  Carotids +2, no bruits. No LE edema. Palpable pulses to radial  arteries.   GI: Truncal obesity with multiple adipose folds, normal bowel sounds, soft, non-distended, non-tender, no masses palpated, no hepatosplenomegaly.  Surgical scars: vertical at center lower abdomen  Lymph/Hematologic: No cervical lymphadenopathy and no supraclavicular lymphadenopathy.  Genitourinary:  deferred  Skin: Warm and dry.    Musculoskeletal: Full ROM of neck. There is no redness, warmth, or swelling of the joints. Gross motor strength is normal.    Neurologic: Awake, alert, oriented to name, place and time. Cranial nerves II-XII are grossly intact. Gait is normal.   Neuropsychiatric: Calm, cooperative. Normal affect.     Labs: (personally reviewed)  CBC, BMP and ua ordered    ASSESSMENT and PLAN  Hilaria Bonner is a 28 year old female scheduled to undergo lap gastric sleeve, possible hiatal hernia repair, with Dr. Luan Lopez, on 3/26/19, in treatment of obesity.     Pre-operative considerations include:  1.) CV: Functional status independent with > 4 METS exercise tolerance. Cardiac risks of HLD, elevated BPs without diagnosis of HTN, smoking history.   RCRI = 1 (if HH repair is done) reflecting 0.9% risk of major adverse cardiac event.  No further cardiac evaluation indicated    2.) Pulmonary: Hx smoking tobacco-recent prescription of Chantix.  No pulmonary diagnosis, dx or meds.  KENDALL risks of BMI 49, HTN = 2/8 = low risk    3.) Heme: No  bleeding or clotting dysfunction  VTE risk is elevated due to BMI > 40    4.) GI: PONV score = 2 ( 2 or > antiemetic prophylaxis recommended.Patient is optimized and is acceptable candidate for the proposed procedure.  No further diagnostic evaluation is needed.     Patient was discussed with Dr Chaves.  Patient is optimized and is acceptable candidate for the proposed procedure.  No further diagnostic evaluation is needed.     GUY Garcia  Preoperative Assessment Center  [facility]  Clinic and Surgery Center  Phone: [phone]  Fax: [phone]

## 2019-03-07 NOTE — OR NURSING
PAC visit Date 3/7/2019   Patient Goal Weight 295 pounds  Actual Weight 300 pounds   Met goal weight  No

## 2019-03-07 NOTE — PATIENT INSTRUCTIONS
Preparing for Your Surgery      Name:  Hilaria Bonner   MRN:  [id number]   :  1990   Today's Date:  3/7/2019     Arriving for surgery:  Surgery date:  3/26/19  Arrival time:  6 am    Please come to:  [facility] [address]  [address] [place]  [address]    -   parking is available in front of the hospital from 5:15 am to 8:00 pm    -  Stop at the Information Desk in the lobby    -   Inform the information person that you are here for surgery. An escort to 3C will be provided. If you would not like an escort, please proceed to 3C on the 3rd floor. [phone]     -  Bring your ID and insurance card.    What can I eat or drink?  -  Follow Bariatric Clinic instructions for Bowel prep and Clear Liquid diet.  -  You may have clear liquids (water, apple juice, Gatorade) until 12 midnight, day of surgery.      -  Sips of water with medications only from 12 midnight until 5 am, day of surgery.    Which medicines can I take?       -  Do not bring your own medications to the hospital.        -  Follow Bariatric Clinic instructions regarding Ibuprofen. If no instructions given, NO Ibuprofen the day prior to surgery.        -  Hold Multivitamin 7 days prior to surgery.    -  Do NOT take these medications in the morning, the day of surgery:  Biotin,     -  Please take these medications the morning of surgery:  Topiramate (Topamax),      Acetaminophen (Tylenol) if needed    How do I prepare myself?  -  Take two showers: one the night before surgery; and one the morning of surgery.         Use Scrubcare or Hibiclens to wash from neck down.  You may use your own shampoo and conditioner. No other hair products.   -  Do NOT use lotion, powder, colognes, deodorant, or antiperspirant the day of your surgery.  -  Do NOT wear any makeup, fingernail polish or jewelry.    -  Begin using Incentive Spirometer 1 week prior to surgery.  Use 4 times per day, up to 5-10 breaths each  time.  Bring Incentive Spirometer to hospital.    Questions or Concerns:  If you have questions or concerns prior to your surgery, call [phone]. (Mon - Fri   8 am- 5:30 pm)  Questions about surgery, contact your Surgeons office.    Influenza visitor restrictions are in force at this time.  [facility] is prohibiting the following visitors:  -Any sick persons regardless of age  -Anyone with known exposure to a communicable illness  -Anyone under the age of 5    AFTER YOUR SURGERY  Breathing exercises   Breathing exercises help you recover faster. Take deep breaths and let the air out slowly. This will:     Help you wake up after surgery.    Help prevent complications like pneumonia.  Preventing complications will help you go home sooner.   Nausea and vomiting   You may feel sick to your stomach after surgery; if so, let your nurse know.    Pain control:  After surgery, you may have pain. Our goal is to help you manage your pain. Pain medicine will help you feel comfortable enough to do activities that will help you heal.  These activities may include breathing exercises, walking and physical therapy.   To help your health care team treat your pain we will ask: 1) If you have pain  2) where it is located 3) describe your pain in your words  Methods of pain control include medications given by mouth, vein or by nerve block for some surgeries.  Sequential Compression Device (SCD) or Pneumo Boots:  You may need to wear SCD S on your legs or feet. These are wraps connected to a machine that pumps in air and releases it. The repeated pumping helps prevent blood clots from forming.   Using an Incentive Spirometer    An incentive spirometer is a device that helps you do deep breathing exercises. These exercises expand your lungs, aid in circulation, and help prevent pneumonia. Deep breathing exercises also help you breathe better and improve the function of your lungs by:    Keeping your lungs clear    Strengthening  your breathing muscles    Helping prevent respiratory complications or problems  The incentive spirometer gives you a way to take an active part in your care. A nurse or therapist will teach you breathing exercises. To do these exercises, you will breathe in through your mouth and not your nose. The incentive spirometer only works correctly if you breathe in through your mouth.    Steps to clear lungs  Step 1. Exhale normally. Then, inhale normally.    Relax and breathe out.  Step 2. Place your lips tightly around the mouthpiece.    Make sure the device is upright and not tilted.  Step 3. Inhale as much air as you can through the mouthpiece (don't breath through your nose).    Inhale slowly and deeply.    Hold your breath long enough to keep the balls or disk raised for at least 3 to 5 seconds, or as instructed by your healthcare provider.  Step 4. Repeat the exercise regularly.

## 2019-03-07 NOTE — ADDENDUM NOTE
Addendum  created 03/07/19 1214 by Shannon Anna RN    Patient Education assessment filed, Patient Education documented on, Patient Education title added, Sign clinical note, Visit Navigator Flowsheet section accepted

## 2019-03-21 ENCOUNTER — ALLIED HEALTH/NURSE VISIT (OUTPATIENT)
Dept: SURGERY | Facility: CLINIC | Age: 29
End: 2019-03-21
Payer: COMMERCIAL

## 2019-03-21 ENCOUNTER — OFFICE VISIT (OUTPATIENT)
Dept: SURGERY | Facility: CLINIC | Age: 29
End: 2019-03-21
Payer: COMMERCIAL

## 2019-03-21 VITALS — BODY MASS INDEX: 49.1 KG/M2 | WEIGHT: 293 LBS

## 2019-03-21 DIAGNOSIS — E66.01 MORBID OBESITY (H): Primary | ICD-10-CM

## 2019-03-21 NOTE — PROGRESS NOTES
Nutrition Education Consult:  Nutrition education regarding clear and low-fat full liquid diet for post-bariatric surgery (SG).  Patient referred by Dr Lopez.  Diet History:  Pt reports having  education in December on clear and low-fat full liquid diet education after bariatric surgery in the past.  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  Interventions:  Provided instruction on bariatric clear and low-fat full liquid diets.  Provided the following handouts: Diet Guidelines for Bariatric Surgery, Sources of Protein, Keeping Track of Your Fluids, list of recommended vitamin/mineral supplementation after SG surgery and RD contact information.  - Per team instructed patient on the liquid diet and gave options for purchasing product in clinic. Pt bought two smooties and 1 clear liquids.   Patient Understanding: good  Expected Compliance: good    Goals:   1. Follow the bariatric post-op diet advancement schedule:  - Bariatric clear liquid diet through post-op day 1 (while in the hospital).  - Bariatric low-fat full liquid diet on post-op days 2 through 13 (2 weeks).  2. Sip on 48-64 oz (or greater) fluids daily, recording intake to help stay on-track.  - Drink at least 2 oz of fluid every 30 min.  3. Weight check for Monday, needs to be within 1 lbs for surgery on Tuesday.   Time: 30 minutes  Chen Darby, MS, RD, LD

## 2019-03-21 NOTE — NURSING NOTE
This patient is having sleeve gastrectomy by Dr. Lopez.    The following handouts were reviewed with the patient :  Before Your Surgery, Patient Checklist, Weight Loss Surgery Pre-operative Class, Preop Recommendations Quick Reference Guide, History and Physical, Medications to Avoid, Shower or Bathing Before Surgery, Bowel Preparation, Powerful Choices and [place] [facility] Directive.  Questions were addressed and understanding of content was verbalized.  Contact information was provided.    Patient goal weight: 300lbs  Weight today:

## 2019-03-21 NOTE — LETTER
3/21/2019       RE: Hilaria Bonner  [address]  [place] [facility] [place] [address]     Dear Colleague,    Thank you for referring your patient, Hilaria Bonner, to the [facility] SURGICAL WEIGHT MANAGEMENT at [facility]. Please see a copy of my visit note below.    Nutrition Education Consult:  Nutrition education regarding clear and low-fat full liquid diet for post-bariatric surgery (SG).  Patient referred by Dr Lopez.  Diet History:  Pt reports having  education in December on clear and low-fat full liquid diet education after bariatric surgery in the past.  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  Interventions:  Provided instruction on bariatric clear and low-fat full liquid diets.  Provided the following handouts: Diet Guidelines for Bariatric Surgery, Sources of Protein, Keeping Track of Your Fluids, list of recommended vitamin/mineral supplementation after SG surgery and RD contact information.  - Per team instructed patient on the liquid diet and gave options for purchasing product in clinic. Pt bought two smooties and 1 clear liquids.   Patient Understanding: good  Expected Compliance: good    Goals:   1. Follow the bariatric post-op diet advancement schedule:  - Bariatric clear liquid diet through post-op day 1 (while in the hospital).  - Bariatric low-fat full liquid diet on post-op days 2 through 13 (2 weeks).  2. Sip on 48-64 oz (or greater) fluids daily, recording intake to help stay on-track.  - Drink at least 2 oz of fluid every 30 min.  3. Weight check  for Monday, needs to be within 1 lbs for surgery on Tuesday.     Time: 45 minutes    Chen Darby MS, RD, LD

## 2019-03-21 NOTE — PATIENT INSTRUCTIONS
It was a pleasure meeting with you today.     Thank you for allowing us the privilege of caring for you. We hope we provided you with the excellent service you deserve.     Please let us know if there is anything else we can do for you so that we can be sure you are leaving completely satisfied with your care experience.        To schedule appointments with our team, please call [phone] option #1    Please call during clinic hours Monday through Friday 8:00a - 4:00p if you have questions or you can contact us via [facility] at anytime.      Nurses: [phone]  Fax: [phone]  Surgery Scheduler: [phone]    Please call the hospital at [phone] to speak with our on call MDs if you have urgent needs after hours, during weekends, or holidays.    Please note if you need to go to the Emergency Room for any reason in the first 90 days after surgery it is important to go to the [facility] on the [address] on [facility] off of the [address] exit off of [address]    *For patients who are currently enrolled in our program and have not yet had weight loss surgery please note*:    You must be at your required goal weight at the time of your Anesthesia clinic visit as well as the day of surgery or your surgery will be canceled. You will not be able to obtain a new surgery date until you have met your goal weight.    Lab results will be communicated through My Chart or letter (if My Chart not used). Please call the clinic if you have not received communication after 1 week or if you have any questions.?      Weight loss medications before and after surgery protocol:    Adipex (phentermine): Stop two days before surgery. Do not restart after surgery. May reconsider restarting as needed in the future.    Topimax (topiramate): Can take right up to surgery including morning of surgery and restart post op day #1.    Qsymia (phentermine/topiramate): Hold morning of surgery. Restart after surgery post op day  #1    Contrave (bupropion/naltrxone): Fast taper before surgery. 1 tablet twice daily for 1 week and then discontinue 4 days before surgery.  Will reconsider restarting at postop appt once no longer taking narcotic pain meds.  Will slowly ramp up again.    Naltrexone: Stop 4 days before surgery.  Will reconsider starting again at postop appts when no longer taking narcotic pain meds.    Belviq (locaserin): Stop 2 days before surgery and restart immediately after surgery    Victoza(liraglutide)/Saxenda(liraglutide 3mg)/Trulicity (dulaglutide) (Any GLP-1): Can take up to surgery date and restart immediately after surgery.    Main follow-up parameters for all bariatric patients     Patients who have undergone Bariatric surgery:    1. Follow-up interval during the first year: ~1 week, 1 month, 3 month, 6 month,12 months.  Every 6 months until 5 years; and then annually thereafter.  The goal of follow-up sessions is to ensure that food intake behavior (choice of food and eating speed) and exercise/activity level are set appropriately.    2. Yearly lab tests ordered by our clinic.      3. The bariatric team should be aware and potentially evaluate all adverse gastrointestinal symptoms (dysphagia, abdominal pain, nausea, vomiting, diarrhea, heartburn, reflux, etc), which can be a sign of complication.  The bariatric surgeons perform general surgery procedures in addition to bariatric surgery (laparoscopic cholecystectomy, etc.)    4. Inability to tolerate textured food (chicken, steak, fish, eggs, beans) is NOT normal and may need to be evaluated by endoscopy performed by the bariatric surgeon.

## 2019-03-21 NOTE — NURSING NOTE
Goal weight: 295lb  Today's weight: 301.9lbs    Patient will see dietitian today and discuss full liquid diet until surgery on Tuesday 3/26/19.     Per Joanne Sterling, patient will schedule nurse visit on Monday 3/25/19 for weight check. If patient is not within 2 pounds of goal, surgery will be postponed.

## 2019-03-25 ENCOUNTER — TELEPHONE (OUTPATIENT)
Dept: SURGERY | Facility: CLINIC | Age: 29
End: 2019-03-25

## 2019-03-25 ENCOUNTER — APPOINTMENT (OUTPATIENT)
Dept: SURGERY | Facility: CLINIC | Age: 29
End: 2019-03-25
Payer: COMMERCIAL

## 2019-03-25 ENCOUNTER — ANESTHESIA EVENT (OUTPATIENT)
Dept: SURGERY | Facility: CLINIC | Age: 29
End: 2019-03-25
Payer: COMMERCIAL

## 2019-03-25 ENCOUNTER — CARE COORDINATION (OUTPATIENT)
Dept: SURGERY | Facility: CLINIC | Age: 29
End: 2019-03-25

## 2019-03-25 VITALS — WEIGHT: 293 LBS | BODY MASS INDEX: 48.06 KG/M2

## 2019-03-25 NOTE — TELEPHONE ENCOUNTER
I called Lenora to see if she had met her weight loss goal as she is on the OR schedule for tomorrow. She just came to clinic and had not weighed in yet.  I did tell her that I moved her from 1st case of the day at 8 a.m. To the last case of the day at 3:30 p.m. If she met her goal. She asked if she didn't make her goal when could she get back on. I told her our policy is to not to give a date until she had met her goal and she could call me to discuss.

## 2019-03-26 ENCOUNTER — ANESTHESIA (OUTPATIENT)
Dept: SURGERY | Facility: CLINIC | Age: 29
End: 2019-03-26
Payer: COMMERCIAL

## 2019-03-26 ENCOUNTER — HOSPITAL ENCOUNTER (INPATIENT)
Facility: CLINIC | Age: 29
LOS: 1 days | Discharge: HOME | End: 2019-03-27
Attending: SURGERY | Admitting: SURGERY
Payer: COMMERCIAL

## 2019-03-26 DIAGNOSIS — E66.01 MORBID (SEVERE) OBESITY DUE TO EXCESS CALORIES (H): Primary | ICD-10-CM

## 2019-03-26 LAB
CREAT SERPL-MCNC: 0.59 MG/DL (ref 0.52–1.04)
GFR SERPL CREATININE-BSD FRML MDRD: >90 ML/MIN/{1.73_M2}
GLUCOSE BLDC GLUCOMTR-MCNC: 88 MG/DL (ref 70–99)
HCG UR QL: NEGATIVE
PLATELET # BLD AUTO: 267 10E9/L (ref 150–450)

## 2019-03-26 PROCEDURE — 00000146 ZZHCL STATISTIC GLUCOSE BY METER IP

## 2019-03-26 PROCEDURE — 25000128 H RX IP 250 OP 636: Performed by: SURGERY

## 2019-03-26 PROCEDURE — 12000001 ZZH R&B MED SURG/OB UMMC

## 2019-03-26 PROCEDURE — 25000128 H RX IP 250 OP 636: Performed by: NURSE ANESTHETIST, CERTIFIED REGISTERED

## 2019-03-26 PROCEDURE — 25000128 H RX IP 250 OP 636: Performed by: PHYSICIAN ASSISTANT

## 2019-03-26 PROCEDURE — 25000128 H RX IP 250 OP 636: Performed by: ANESTHESIOLOGY

## 2019-03-26 PROCEDURE — 0DB64Z3 EXCISION OF STOMACH, PERCUTANEOUS ENDOSCOPIC APPROACH, VERTICAL: ICD-10-PCS | Performed by: SURGERY

## 2019-03-26 PROCEDURE — 25000125 ZZHC RX 250: Performed by: SURGERY

## 2019-03-26 PROCEDURE — 25000132 ZZH RX MED GY IP 250 OP 250 PS 637: Performed by: SURGERY

## 2019-03-26 PROCEDURE — 88305 TISSUE EXAM BY PATHOLOGIST: CPT | Performed by: SURGERY

## 2019-03-26 PROCEDURE — 82565 ASSAY OF CREATININE: CPT | Performed by: SURGERY

## 2019-03-26 PROCEDURE — 25000125 ZZHC RX 250: Performed by: NURSE ANESTHETIST, CERTIFIED REGISTERED

## 2019-03-26 PROCEDURE — 25000566 ZZH SEVOFLURANE, EA 15 MIN: Performed by: SURGERY

## 2019-03-26 PROCEDURE — 36415 COLL VENOUS BLD VENIPUNCTURE: CPT | Performed by: SURGERY

## 2019-03-26 PROCEDURE — 81025 URINE PREGNANCY TEST: CPT | Performed by: ANESTHESIOLOGY

## 2019-03-26 PROCEDURE — 25000132 ZZH RX MED GY IP 250 OP 250 PS 637: Performed by: ANESTHESIOLOGY

## 2019-03-26 PROCEDURE — 37000009 ZZH ANESTHESIA TECHNICAL FEE, EACH ADDTL 15 MIN: Performed by: SURGERY

## 2019-03-26 PROCEDURE — 40000170 ZZH STATISTIC PRE-PROCEDURE ASSESSMENT II: Performed by: SURGERY

## 2019-03-26 PROCEDURE — 71000015 ZZH RECOVERY PHASE 1 LEVEL 2 EA ADDTL HR: Performed by: SURGERY

## 2019-03-26 PROCEDURE — 36000064 ZZH SURGERY LEVEL 4 EA 15 ADDTL MIN - UMMC: Performed by: SURGERY

## 2019-03-26 PROCEDURE — 37000008 ZZH ANESTHESIA TECHNICAL FEE, 1ST 30 MIN: Performed by: SURGERY

## 2019-03-26 PROCEDURE — 71000014 ZZH RECOVERY PHASE 1 LEVEL 2 FIRST HR: Performed by: SURGERY

## 2019-03-26 PROCEDURE — 27210794 ZZH OR GENERAL SUPPLY STERILE: Performed by: SURGERY

## 2019-03-26 PROCEDURE — 25800030 ZZH RX IP 258 OP 636: Performed by: SURGERY

## 2019-03-26 PROCEDURE — 85049 AUTOMATED PLATELET COUNT: CPT | Performed by: SURGERY

## 2019-03-26 PROCEDURE — 25800030 ZZH RX IP 258 OP 636: Performed by: NURSE ANESTHETIST, CERTIFIED REGISTERED

## 2019-03-26 PROCEDURE — 36000062 ZZH SURGERY LEVEL 4 1ST 30 MIN - UMMC: Performed by: SURGERY

## 2019-03-26 RX ORDER — ONDANSETRON 2 MG/ML
4 INJECTION INTRAMUSCULAR; INTRAVENOUS EVERY 6 HOURS PRN
Status: DISCONTINUED | OUTPATIENT
Start: 2019-03-26 | End: 2019-03-27 | Stop reason: HOSPADM

## 2019-03-26 RX ORDER — HYDROMORPHONE HYDROCHLORIDE 1 MG/ML
.3-.5 INJECTION, SOLUTION INTRAMUSCULAR; INTRAVENOUS; SUBCUTANEOUS EVERY 5 MIN PRN
Status: DISCONTINUED | OUTPATIENT
Start: 2019-03-26 | End: 2019-03-26 | Stop reason: HOSPADM

## 2019-03-26 RX ORDER — LIDOCAINE 40 MG/G
CREAM TOPICAL
Status: DISCONTINUED | OUTPATIENT
Start: 2019-03-26 | End: 2019-03-27 | Stop reason: HOSPADM

## 2019-03-26 RX ORDER — LIDOCAINE 40 MG/G
CREAM TOPICAL
Status: DISCONTINUED | OUTPATIENT
Start: 2019-03-26 | End: 2019-03-26 | Stop reason: HOSPADM

## 2019-03-26 RX ORDER — LABETALOL 20 MG/4 ML (5 MG/ML) INTRAVENOUS SYRINGE
10
Status: DISCONTINUED | OUTPATIENT
Start: 2019-03-26 | End: 2019-03-26 | Stop reason: HOSPADM

## 2019-03-26 RX ORDER — DIPHENHYDRAMINE HYDROCHLORIDE 50 MG/ML
25 INJECTION INTRAMUSCULAR; INTRAVENOUS EVERY 6 HOURS PRN
Status: DISCONTINUED | OUTPATIENT
Start: 2019-03-26 | End: 2019-03-27 | Stop reason: HOSPADM

## 2019-03-26 RX ORDER — OXYCODONE HCL 5 MG/5 ML
5 SOLUTION, ORAL ORAL EVERY 4 HOURS PRN
Status: DISCONTINUED | OUTPATIENT
Start: 2019-03-26 | End: 2019-03-27 | Stop reason: HOSPADM

## 2019-03-26 RX ORDER — MEPERIDINE HYDROCHLORIDE 50 MG/ML
12.5 INJECTION INTRAMUSCULAR; INTRAVENOUS; SUBCUTANEOUS EVERY 5 MIN PRN
Status: DISCONTINUED | OUTPATIENT
Start: 2019-03-26 | End: 2019-03-26 | Stop reason: HOSPADM

## 2019-03-26 RX ORDER — SODIUM CHLORIDE, SODIUM LACTATE, POTASSIUM CHLORIDE, CALCIUM CHLORIDE 600; 310; 30; 20 MG/100ML; MG/100ML; MG/100ML; MG/100ML
INJECTION, SOLUTION INTRAVENOUS CONTINUOUS
Status: DISCONTINUED | OUTPATIENT
Start: 2019-03-26 | End: 2019-03-26 | Stop reason: HOSPADM

## 2019-03-26 RX ORDER — GABAPENTIN 300 MG/1
300 CAPSULE ORAL ONCE
Status: COMPLETED | OUTPATIENT
Start: 2019-03-26 | End: 2019-03-26

## 2019-03-26 RX ORDER — DEXAMETHASONE SODIUM PHOSPHATE 4 MG/ML
4 INJECTION, SOLUTION INTRA-ARTICULAR; INTRALESIONAL; INTRAMUSCULAR; INTRAVENOUS; SOFT TISSUE
Status: DISCONTINUED | OUTPATIENT
Start: 2019-03-26 | End: 2019-03-26 | Stop reason: HOSPADM

## 2019-03-26 RX ORDER — NALOXONE HYDROCHLORIDE 0.4 MG/ML
.1-.4 INJECTION, SOLUTION INTRAMUSCULAR; INTRAVENOUS; SUBCUTANEOUS
Status: DISCONTINUED | OUTPATIENT
Start: 2019-03-26 | End: 2019-03-27 | Stop reason: HOSPADM

## 2019-03-26 RX ORDER — HYDROMORPHONE HCL/0.9% NACL/PF 0.2MG/0.2
0.2 SYRINGE (ML) INTRAVENOUS
Status: DISCONTINUED | OUTPATIENT
Start: 2019-03-26 | End: 2019-03-27 | Stop reason: HOSPADM

## 2019-03-26 RX ORDER — LIDOCAINE HYDROCHLORIDE 20 MG/ML
INJECTION, SOLUTION INFILTRATION; PERINEURAL PRN
Status: DISCONTINUED | OUTPATIENT
Start: 2019-03-26 | End: 2019-03-26

## 2019-03-26 RX ORDER — FENTANYL CITRATE 50 UG/ML
25-50 INJECTION, SOLUTION INTRAMUSCULAR; INTRAVENOUS
Status: DISCONTINUED | OUTPATIENT
Start: 2019-03-26 | End: 2019-03-26 | Stop reason: HOSPADM

## 2019-03-26 RX ORDER — DEXAMETHASONE SODIUM PHOSPHATE 10 MG/ML
INJECTION, SOLUTION INTRAMUSCULAR; INTRAVENOUS PRN
Status: DISCONTINUED | OUTPATIENT
Start: 2019-03-26 | End: 2019-03-26

## 2019-03-26 RX ORDER — OXYCODONE HYDROCHLORIDE 5 MG/1
5-10 TABLET ORAL
Status: DISCONTINUED | OUTPATIENT
Start: 2019-03-26 | End: 2019-03-27 | Stop reason: HOSPADM

## 2019-03-26 RX ORDER — SODIUM CHLORIDE, SODIUM LACTATE, POTASSIUM CHLORIDE, CALCIUM CHLORIDE 600; 310; 30; 20 MG/100ML; MG/100ML; MG/100ML; MG/100ML
INJECTION, SOLUTION INTRAVENOUS CONTINUOUS PRN
Status: DISCONTINUED | OUTPATIENT
Start: 2019-03-26 | End: 2019-03-26

## 2019-03-26 RX ORDER — AMOXICILLIN 250 MG
2 CAPSULE ORAL 2 TIMES DAILY
Status: DISCONTINUED | OUTPATIENT
Start: 2019-03-26 | End: 2019-03-27 | Stop reason: HOSPADM

## 2019-03-26 RX ORDER — PROCHLORPERAZINE MALEATE 5 MG
10 TABLET ORAL EVERY 6 HOURS PRN
Status: DISCONTINUED | OUTPATIENT
Start: 2019-03-26 | End: 2019-03-27 | Stop reason: HOSPADM

## 2019-03-26 RX ORDER — ONDANSETRON 2 MG/ML
INJECTION INTRAMUSCULAR; INTRAVENOUS PRN
Status: DISCONTINUED | OUTPATIENT
Start: 2019-03-26 | End: 2019-03-26

## 2019-03-26 RX ORDER — ACETAMINOPHEN 325 MG/1
975 TABLET ORAL ONCE
Status: COMPLETED | OUTPATIENT
Start: 2019-03-26 | End: 2019-03-26

## 2019-03-26 RX ORDER — HYOSCYAMINE SULFATE 0.125 MG
125 TABLET ORAL EVERY 4 HOURS PRN
Status: DISCONTINUED | OUTPATIENT
Start: 2019-03-26 | End: 2019-03-27 | Stop reason: HOSPADM

## 2019-03-26 RX ORDER — DIPHENHYDRAMINE HCL 25 MG
25 CAPSULE ORAL EVERY 6 HOURS PRN
Status: DISCONTINUED | OUTPATIENT
Start: 2019-03-26 | End: 2019-03-27 | Stop reason: HOSPADM

## 2019-03-26 RX ORDER — FENTANYL CITRATE 50 UG/ML
INJECTION, SOLUTION INTRAMUSCULAR; INTRAVENOUS PRN
Status: DISCONTINUED | OUTPATIENT
Start: 2019-03-26 | End: 2019-03-26

## 2019-03-26 RX ORDER — ONDANSETRON 2 MG/ML
4 INJECTION INTRAMUSCULAR; INTRAVENOUS EVERY 30 MIN PRN
Status: DISCONTINUED | OUTPATIENT
Start: 2019-03-26 | End: 2019-03-26 | Stop reason: HOSPADM

## 2019-03-26 RX ORDER — BUPIVACAINE HYDROCHLORIDE 2.5 MG/ML
INJECTION, SOLUTION EPIDURAL; INFILTRATION; INTRACAUDAL PRN
Status: DISCONTINUED | OUTPATIENT
Start: 2019-03-26 | End: 2019-03-26 | Stop reason: HOSPADM

## 2019-03-26 RX ORDER — ONDANSETRON 4 MG/1
4 TABLET, ORALLY DISINTEGRATING ORAL EVERY 30 MIN PRN
Status: DISCONTINUED | OUTPATIENT
Start: 2019-03-26 | End: 2019-03-26 | Stop reason: HOSPADM

## 2019-03-26 RX ORDER — ONDANSETRON 4 MG/1
4 TABLET, ORALLY DISINTEGRATING ORAL EVERY 6 HOURS PRN
Status: DISCONTINUED | OUTPATIENT
Start: 2019-03-26 | End: 2019-03-27 | Stop reason: HOSPADM

## 2019-03-26 RX ORDER — CEFAZOLIN SODIUM 1 G/3ML
1 INJECTION, POWDER, FOR SOLUTION INTRAMUSCULAR; INTRAVENOUS SEE ADMIN INSTRUCTIONS
Status: DISCONTINUED | OUTPATIENT
Start: 2019-03-26 | End: 2019-03-26 | Stop reason: HOSPADM

## 2019-03-26 RX ORDER — SCOLOPAMINE TRANSDERMAL SYSTEM 1 MG/1
1 PATCH, EXTENDED RELEASE TRANSDERMAL
Status: DISCONTINUED | OUTPATIENT
Start: 2019-03-26 | End: 2019-03-27 | Stop reason: HOSPADM

## 2019-03-26 RX ORDER — ACETAMINOPHEN 325 MG/1
650 TABLET ORAL EVERY 4 HOURS PRN
Status: DISCONTINUED | OUTPATIENT
Start: 2019-03-26 | End: 2019-03-27 | Stop reason: HOSPADM

## 2019-03-26 RX ORDER — HYDRALAZINE HYDROCHLORIDE 20 MG/ML
2.5-5 INJECTION INTRAMUSCULAR; INTRAVENOUS EVERY 10 MIN PRN
Status: DISCONTINUED | OUTPATIENT
Start: 2019-03-26 | End: 2019-03-26 | Stop reason: HOSPADM

## 2019-03-26 RX ORDER — PROPOFOL 10 MG/ML
INJECTION, EMULSION INTRAVENOUS PRN
Status: DISCONTINUED | OUTPATIENT
Start: 2019-03-26 | End: 2019-03-26

## 2019-03-26 RX ORDER — CEFAZOLIN SODIUM IN 0.9 % NACL 3 G/100 ML
3 INTRAVENOUS SOLUTION, PIGGYBACK (ML) INTRAVENOUS
Status: COMPLETED | OUTPATIENT
Start: 2019-03-26 | End: 2019-03-26

## 2019-03-26 RX ORDER — SODIUM CHLORIDE, SODIUM LACTATE, POTASSIUM CHLORIDE, CALCIUM CHLORIDE 600; 310; 30; 20 MG/100ML; MG/100ML; MG/100ML; MG/100ML
INJECTION, SOLUTION INTRAVENOUS CONTINUOUS
Status: DISCONTINUED | OUTPATIENT
Start: 2019-03-26 | End: 2019-03-27 | Stop reason: HOSPADM

## 2019-03-26 RX ADMIN — Medication 0.3 MG: at 18:08

## 2019-03-26 RX ADMIN — FENTANYL CITRATE 50 MCG: 50 INJECTION, SOLUTION INTRAMUSCULAR; INTRAVENOUS at 16:21

## 2019-03-26 RX ADMIN — SUGAMMADEX 260 MG: 100 INJECTION, SOLUTION INTRAVENOUS at 16:45

## 2019-03-26 RX ADMIN — LIDOCAINE HYDROCHLORIDE 100 MG: 20 INJECTION, SOLUTION INFILTRATION; PERINEURAL at 15:19

## 2019-03-26 RX ADMIN — OXYCODONE HYDROCHLORIDE 5 MG: 5 SOLUTION ORAL at 21:45

## 2019-03-26 RX ADMIN — FENTANYL CITRATE 50 MCG: 50 INJECTION INTRAMUSCULAR; INTRAVENOUS at 17:26

## 2019-03-26 RX ADMIN — SENNOSIDES AND DOCUSATE SODIUM 2 TABLET: 8.6; 5 TABLET ORAL at 21:45

## 2019-03-26 RX ADMIN — Medication 100 MG: at 15:21

## 2019-03-26 RX ADMIN — Medication 3 G: at 15:30

## 2019-03-26 RX ADMIN — PROPOFOL 200 MG: 10 INJECTION, EMULSION INTRAVENOUS at 15:21

## 2019-03-26 RX ADMIN — ONDANSETRON 4 MG: 2 INJECTION INTRAMUSCULAR; INTRAVENOUS at 15:25

## 2019-03-26 RX ADMIN — GABAPENTIN 300 MG: 300 CAPSULE ORAL at 14:49

## 2019-03-26 RX ADMIN — DEXAMETHASONE SODIUM PHOSPHATE 6 MG: 10 INJECTION, SOLUTION INTRAMUSCULAR; INTRAVENOUS at 15:25

## 2019-03-26 RX ADMIN — Medication 0.2 MG: at 20:16

## 2019-03-26 RX ADMIN — ROCURONIUM BROMIDE 10 MG: 10 INJECTION INTRAVENOUS at 15:33

## 2019-03-26 RX ADMIN — MIDAZOLAM 2 MG: 1 INJECTION INTRAMUSCULAR; INTRAVENOUS at 15:10

## 2019-03-26 RX ADMIN — ONDANSETRON 4 MG: 2 INJECTION INTRAMUSCULAR; INTRAVENOUS at 21:52

## 2019-03-26 RX ADMIN — ONDANSETRON 4 MG: 2 INJECTION INTRAMUSCULAR; INTRAVENOUS at 17:05

## 2019-03-26 RX ADMIN — SODIUM CHLORIDE, POTASSIUM CHLORIDE, SODIUM LACTATE AND CALCIUM CHLORIDE: 600; 310; 30; 20 INJECTION, SOLUTION INTRAVENOUS at 17:00

## 2019-03-26 RX ADMIN — HYDRALAZINE HYDROCHLORIDE 5 MG: 20 INJECTION INTRAMUSCULAR; INTRAVENOUS at 18:16

## 2019-03-26 RX ADMIN — ACETAMINOPHEN 975 MG: 325 TABLET, FILM COATED ORAL at 14:49

## 2019-03-26 RX ADMIN — SCOPOLAMINE 1 PATCH: 1 PATCH, EXTENDED RELEASE TRANSDERMAL at 17:15

## 2019-03-26 RX ADMIN — ENOXAPARIN SODIUM 40 MG: 40 INJECTION SUBCUTANEOUS at 14:49

## 2019-03-26 RX ADMIN — Medication 0.5 MG: at 18:57

## 2019-03-26 RX ADMIN — SODIUM CHLORIDE, POTASSIUM CHLORIDE, SODIUM LACTATE AND CALCIUM CHLORIDE: 600; 310; 30; 20 INJECTION, SOLUTION INTRAVENOUS at 14:54

## 2019-03-26 RX ADMIN — TOPIRAMATE 75 MG: 50 TABLET ORAL at 21:45

## 2019-03-26 RX ADMIN — PROCHLORPERAZINE EDISYLATE 10 MG: 5 INJECTION INTRAMUSCULAR; INTRAVENOUS at 18:26

## 2019-03-26 RX ADMIN — FENTANYL CITRATE 100 MCG: 50 INJECTION, SOLUTION INTRAMUSCULAR; INTRAVENOUS at 15:19

## 2019-03-26 RX ADMIN — SODIUM CHLORIDE, POTASSIUM CHLORIDE, SODIUM LACTATE AND CALCIUM CHLORIDE: 600; 310; 30; 20 INJECTION, SOLUTION INTRAVENOUS at 19:14

## 2019-03-26 RX ADMIN — FENTANYL CITRATE 50 MCG: 50 INJECTION INTRAMUSCULAR; INTRAVENOUS at 17:47

## 2019-03-26 RX ADMIN — FENTANYL CITRATE 50 MCG: 50 INJECTION, SOLUTION INTRAMUSCULAR; INTRAVENOUS at 15:36

## 2019-03-26 RX ADMIN — FENTANYL CITRATE 50 MCG: 50 INJECTION, SOLUTION INTRAMUSCULAR; INTRAVENOUS at 17:01

## 2019-03-26 RX ADMIN — ROCURONIUM BROMIDE 50 MG: 10 INJECTION INTRAVENOUS at 15:25

## 2019-03-26 ASSESSMENT — ACTIVITIES OF DAILY LIVING (ADL)
FALL_HISTORY_WITHIN_LAST_SIX_MONTHS: NO
AMBULATION: 0-->INDEPENDENT
ADLS_ACUITY_SCORE: 10
TRANSFERRING: 0-->INDEPENDENT
TOILETING: 0-->INDEPENDENT
SWALLOWING: 0-->SWALLOWS FOODS/LIQUIDS WITHOUT DIFFICULTY
RETIRED_EATING: 0-->INDEPENDENT
RETIRED_COMMUNICATION: 0-->UNDERSTANDS/COMMUNICATES WITHOUT DIFFICULTY
BATHING: 0-->INDEPENDENT
COGNITION: 0 - NO COGNITION ISSUES REPORTED
DRESS: 0-->INDEPENDENT

## 2019-03-26 ASSESSMENT — MIFFLIN-ST. JEOR: SCORE: 2067.75

## 2019-03-26 ASSESSMENT — PAIN DESCRIPTION - DESCRIPTORS
DESCRIPTORS: ACHING
DESCRIPTORS: ACHING;SHARP

## 2019-03-26 NOTE — ANESTHESIA PREPROCEDURE EVALUATION
"Anesthesia Pre-Procedure Evaluation    Patient: Hilaria Bonner   MRN:     [id number] Gender:   female   Age:    28 year old :      1990        Preoperative Diagnosis: Morbid Obesity   Procedure(s):  Laparoscopic Sleeve Gastrectomy  Possible Laparoscopic Hiatal Hernia Repair     Past Medical History:   Diagnosis Date     Earache or other ear, nose, or throat complaint 12/15    tyroid      Past Surgical History:   Procedure Laterality Date     GYN SURGERY      2 C-sections     KNEE SURGERY  most recently - [id number]    3 surgeries in [place]     ORTHOPEDIC SURGERY      2 knee meniscus surgery                   PHYSICAL EXAM:   Mental Status/Neuro:    Airway: Facies: Feasible  Mallampati: II  Mouth/Opening: Full  TM distance: Not Assessed  Neck ROM: Full   Respiratory:    CV:    Comments:                    Lab Results   Component Value Date    WBC 5.5 2018    HGB 15.3 2018    HCT 45.3 2018     2018     2018    POTASSIUM 3.9 2018    CHLORIDE 108 2018    CO2 23 2018    BUN 10 2018    CR 0.70 2018    GLC 85 2018    QUINCY 8.4 (L) 2018    ALBUMIN 3.3 (L) 2018    PROTTOTAL 7.6 2018    ALT 18 2018    AST 21 2018    ALKPHOS 125 2018    BILITOTAL 0.5 2018    TSH 2.39 2018       Preop Vitals  BP Readings from Last 3 Encounters:   19 (!) 144/95   19 (!) 149/105   18 (!) 128/97    Pulse Readings from Last 3 Encounters:   19 102   19 86   18 83      Resp Readings from Last 3 Encounters:   19 18   19 18   18 26    SpO2 Readings from Last 3 Encounters:   19 99%   19 99%   18 97%      Temp Readings from Last 1 Encounters:   19 36.8  C (98.3  F) (Oral)    Ht Readings from Last 1 Encounters:   19 1.676 m (5' 6\")      Wt Readings from Last 1 Encounters:   19 132.1 kg (291 lb 3.6 oz)    Estimated body mass index is " "47.01 kg/m  as calculated from the following:    Height as of this encounter: 1.676 m (5' 6\").    Weight as of this encounter: 132.1 kg (291 lb 3.6 oz).     LDA:  ETT (adult) (Active)   Number of days: 0            Assessment:   ASA SCORE: 2    NPO Status: > 6 hours since completed Solid Foods   Documentation: H&P complete; Preop Testing complete; Consents complete   Proceeding: Proceed without further delay  Tobacco Use:  NO Active use of Tobacco/UNKNOWN Tobacco use status     Plan:   Anes. Type:  General   Pre-Induction: Midazolam IV; Acetaminophen PO   Induction:  IV (Standard)   Airway: Oral ETT   Access/Monitoring: PIV   Maintenance: Balanced   Emergence: Procedure Site   Logistics: Same Day Surgery     Postop Pain/Sedation Strategy:  Standard-Options: Opioids PRN     PONV Management:  Adult Risk Factors: Female, Non-Smoker, Postop Opioids     CONSENT: Direct conversation                               Deonte Chaves MD  "

## 2019-03-26 NOTE — LETTER
"March 29, 2019      TO: Hilaria Bonner  [address]  [address]  [facility] [address]         Dear Ms. Hilaria Bonner,    We received and reviewed your test results done on 03/26/2019.  You may receive more than one letter if we receive the results on multiple days.  Please share all lab and test results with your primary care provider and keep a copy for your own records.        Your test results are normal.    If you have any questions, feel free contact us at the Call Center [phone].      Resulted Orders   Surgical pathology exam   Result Value Ref Range    Copath Report       Patient Name: HILARIA BONNER  MR#: [id number]  Specimen #: [id number]  Collected: 3/26/2019  Received: 3/26/2019  Reported: 3/28/2019 11:24  Ordering Phy(s): JAMES SHEN    For improved result formatting, select 'View Enhanced Report Format' under   Linked Documents section.    SPECIMEN(S):  Partial gastrectomy    FINAL DIAGNOSIS:  STOMACH, PARTIAL GASTRECTOMY:  - Gastric mucosa with no significant histologic abnormality  - Negative for intestinal metaplasia and dysplasia  - No H. pylori organisms identified on routine stain    I have personally reviewed all specimens and/or slides, including the   listed special stains, and used them  with my medical judgement to determine or confirm the final diagnosis.    Electronically signed out by:    Yash Smith M.D., [facility]    CLINICAL HISTORY:    Morbid obesity    GROSS:  The specimen is received in formalin with proper patient identification,   labeled \"partial gastrectomy\".  The  specimen consists of a 17.7 x 3.5 x 3.5 cm  partial gastrectomy. There is a   17.7 cm in length continuous staple  line. The serosal surface is tan brown to tan-gray and smooth with no   gross identifiable areas of puckering or  perforation. The specimen is opened to reveal a red-brown hemorrhagic   material and tan-pink to tan-brown  unremarkable rugal folds with no gross identifiable " polyps, areas of   ulceration or lesions. Representative  sections are submitted in cassette A1. (Dictated by: Lola HAMILTON   3/27/2019 10:11 AM)    MICROSCOPIC:  Microscopic exam was performed    The technical component of this testing was completed at the [facility], with the professional component performed   at the [facility], [address] [place] [id number] ([phone])    CPT Codes:  A: 44973-ML1    COLLECTION SITE:  Client: [facility]  Location: UUOR (B)         Have a great weekend  Vannesa Prince RN    Sincerely,       MD

## 2019-03-26 NOTE — DISCHARGE SUMMARY
General Surgery  [facility] Discharge Summary    Hilaria Bonner MRN: [id number]   YOB: 1990 Age: 28 year old     Date of Admission:  3/26/2019  Date of Discharge::  3/27/2019  Admitting Physician:  Luan Lopez MD  Discharge Physician:  Luan Lopez MD  Primary Care Physician:         [facility]          Discharge Diagnosis:   Morbid obesity         Procedures:   Laparoscopic sleeve gastrectomy          Consultations:   PHARMACY BARIATRIC IP CONSULT  RESPIRATORY CARE IP CONSULT          HPI (from H&P):   Hilaria Bonner is a 28 year old female who presents for pre-operative H & P in preparation for  lap gastric sleeve, possible hiatal hernia repair, with Dr. Luan Lopez, on 3/26/19,  at [facility], in treatment of obesity.  She has co morbidities of elevated BPs, knee pain.     History is obtained from the patient.            Hospital Course:   The patient was admitted and underwent the above procedure. The patient tolerated the procedure well. There were no complications. The patient's diet was slowly advanced as bowel function returned. Pain was controlled with oral pain medication and the patient was able to ambulate and void without difficulty. The patient received appropriate education post operatively. On POD #1 the patient was discharged to home.         Final Pathology Result:   Surgical pathology pending         Pertinent Imaging Results:   None         Medications Prior to Admission:     Medications Prior to Admission   Medication Sig Dispense Refill Last Dose     topiramate (TOPAMAX) 25 MG tablet Take 3 tablets (75 mg) by mouth 2 times daily 180 tablet 3 3/23/2019 at 0800     [DISCONTINUED] acetaminophen (TYLENOL) 500 MG tablet Take 1,000 mg by mouth as needed for mild pain   3/22/2019 at 0800     [DISCONTINUED] BIOTIN PO Take 1 tablet by mouth every morning   3/23/2019 at 0800     [DISCONTINUED] Multiple  Vitamins-Minerals (WOMENS MULTI VITAMIN & MINERAL PO) Take 1 tablet by mouth every morning    3/23/2019 at 0800     [DISCONTINUED] VITAMIN D, ERGOCALCIFEROL, PO Take 3,000 Units by mouth twice a week Morning   3/23/2019 at 0800            Discharge Medications:     Current Discharge Medication List      START taking these medications    Details   hyoscyamine (ANASPAZ/LEVSIN) 0.125 MG tablet Take 1 tablet (125 mcg) by mouth every 4 hours as needed for cramping (spasms) Take 1-2 tabs every 4 hours as needed for abdominal cramping  Qty: 80 tablet, Refills: 1    Associated Diagnoses: Morbid (severe) obesity due to excess calories (H)      omeprazole (PRILOSEC) 20 MG DR capsule Take 1 capsule (20 mg) by mouth 2 times daily  Qty: 28 capsule, Refills: 0    Associated Diagnoses: Morbid (severe) obesity due to excess calories (H)      ondansetron (ZOFRAN-ODT) 4 MG ODT tab Take 1 tablet (4 mg) by mouth every 6 hours as needed for nausea or vomiting  Qty: 28 tablet, Refills: 1    Associated Diagnoses: Morbid (severe) obesity due to excess calories (H)      oxyCODONE (ROXICODONE) 5 MG tablet Take 1-2 tablets (5-10 mg) by mouth every 6 hours as needed for moderate to severe pain  Qty: 20 tablet, Refills: 0    Associated Diagnoses: Morbid (severe) obesity due to excess calories (H)      senna-docusate (SENOKOT-S/PERICOLACE) 8.6-50 MG tablet Take 2 tablets by mouth 2 times daily  Qty: 28 tablet, Refills: 0    Associated Diagnoses: Morbid (severe) obesity due to excess calories (H)         CONTINUE these medications which have CHANGED    Details   acetaminophen (TYLENOL) 325 MG tablet Take 2 tablets (650 mg) by mouth every 4 hours as needed for other (For optimal non-opioid multimodal pain management to improve pain control and physical function.)  Qty: 100 tablet, Refills: 0    Associated Diagnoses: Morbid (severe) obesity due to excess calories (H)         CONTINUE these medications which have NOT CHANGED    Details   topiramate  (TOPAMAX) 25 MG tablet Take 3 tablets (75 mg) by mouth 2 times daily  Qty: 180 tablet, Refills: 3    Associated Diagnoses: Morbid obesity (H)         STOP taking these medications       BIOTIN PO Comments:   Reason for Stopping:         Multiple Vitamins-Minerals (WOMENS MULTI VITAMIN & MINERAL PO) Comments:   Reason for Stopping:         VITAMIN D, ERGOCALCIFEROL, PO Comments:   Reason for Stopping:                    Day of Discharge Physical Exam:   Temp:  [96.8  F (36  C)-98.3  F (36.8  C)] 98.2  F (36.8  C)  Pulse:  [] 78  Heart Rate:  [66-97] 76  Resp:  [8-24] 19  BP: (124-168)/() 143/79  SpO2:  [96 %-100 %] 100 %  General: Awake, alert and oriented, & in NAD  Resp: No audible wheeze. Non labored breathing.  Cardiac: Warm well-perfused  Abdomen: Soft, non-distended, mildly but appropriately tender to palpation  Incision: c/d/i without erythema, warmth, or discharge. Staples removed, steri-strips applied  Extremities: No LE edema or obvious joint abnormalities           Discharge Instructions and Follow-Up:        Reason for your hospital stay    You were in the hospital for weight loss surgery (laparoscopic vertical sleeve gastrectomy).     Adult [facility] Follow-up and recommended labs and tests    See discharge instructions    Appointments on [place] and/or [facility] (with [facility] or [facility] provider or service). Call [phone] if you haven't heard regarding these appointments within 7 days of discharge.     Activity    See discharge instructions     When to contact your care team    See discharge instructions     Wound care and dressings    See discharge instructions     Discharge Instructions    DIET  Diet on discharge: post-op diet is bariatric clear liquids for 1 DAY and then advance to full liquids two days after surgery (see below).      Clear liquids definition and examples:  Liquids you can see through.  Examples: Water; decaf, sugar-free iced tea; sugar-free popsicles, clear broth;  cranberry juice,  100% apple or grape juice - diluted with water and limit to 4 oz/day; decaf coffee or tea; sugar-free enhanced or infused water; herbal tea; low calorie sports drink - less than 20 joseline/8 oz; sugar-free gelatin.    Low-fat, full liquids definition and examples:  Full liquids are thicker than clear liquids.  Examples: Skim milk, 1% milk, plain soy milk, Lactaid milk; approved protein drink; low-calorie or light smooth yogurt -no chunks of fruit or food; fat-free plain or Greek yogurt; sugar-free pudding; low-fat cream soup - must be strained;  Cream of Wheat, Cream of Rice, Malt-O-Meal; any clear liquids - juice must be diluted, pulp-free, not sweetened and less than 4 oz/day.    ACTIVITY, WOUND CARE, MEDICATIONS  No lifting >10 pounds for 4-6 weeks. Discuss with your surgeon at follow up appointment  May shower starting postoperative day #1 but no scrubbing incisions. No bathing or soaking incisions for 2 weeks or until incisions completely healed.  Wound care: Keep clean and dry. Steri strips or glue will fall off on their own.    Take stool softener while taking narcotic pain medication.  No driving for at least 12 hours after taking narcotic pain medication.    After surgery it is ok to swallow medications smaller than 1/4 inch (size of pencil eraser) for all procedures.  If medication is larger than 1/4 inch then it will need to be crushed, cut or in liquid form for 1-2 months after surgery. This can be discussed with surgeon team at the 1 month follow up appointment and will depend on patient tolerance.  You will be sent home with omeprazole 20mg once daily for heartburn symptom prevention, zofran as needed for nausea and a medication called hyoscyamine (levsin) as needed for cramping.    FOLLOW UP  Follow-up with your surgeon (Dr. Lopez) team in 1-2 weeks. If this appointment was not previously made for you please call:  BARIATRIC PATIENTS:  Call [phone] to schedule or speak with a  nurse.  You will receive a call from our clinic nurse after surgery.      RETURN PRECAUTIONS  If you are experiencing increasing abdominal pain, nausea, vomiting, increasing drainage from your wounds, chills, or fever >101.5 and unable to reach a nurse call [phone] and ask for on call surgery resident.    AFTER HOURS QUESTIONS OR CONCERNS:   Call [phone] and ask to speak with surgery resident if you are having troubles in the evenings, at night, or on weekends. Please call if you experience increasing abdominal pain, nausea, vomiting, increasing drainage from your wounds, chills, or fever >101.5  Appointments located at [facility] clinic:  [facility] [place]    [address] [address]     Diet    See discharge instructions       Condition at discharge: Stable    Follow Up:  -Follow up with Dr Lopez in clinic in 1-2 week(s) after discharge.     BARIATRIC PATIENTS:  Call [phone] to schedule or to reach your care team    GENERAL SURGERY PATIENTS: Call [phone] for scheduling needs or to reach your care team      Patient and plan discussed on day of discharge with upper level resident, who discussed with MIS fellow, Dr. Toledo.  - - - - - - - - - - - - - - - - - -  Jc Aparicio MD  General Surgery PGY-1  Pager [phone]

## 2019-03-26 NOTE — OP NOTE
[facility], [place]    Operative Note    Pre-operative diagnosis: Morbid Obesity   Post-operative diagnosis * No post-op diagnosis entered *   Procedure: Procedure(s):  Laparoscopic Sleeve Gastrectomy   Surgeon: Surgeon(s) and Role:     * Luan Lopez MD - Primary     * Pop Toledo MD - Assisting   Assistant Surgeon      Anesthesia: Pop Toledo MD; [please note that Dr. Toledo was scrubbed and assisted during the operation due to the unavailability of a qualified surgical resident.]  General    Estimated blood loss: Minimal   Drains: None   Specimens: ID Type Source Tests Collected by Time Destination   A : Partial Gastrectomy Tissue Stomach, Fundus SURGICAL PATHOLOGY EXAM Luan Lopez MD 3/26/2019  4:17 PM       Findings: no hiatal hernia.   Complications: None.   Implants: None.         BOUGIE SIZE: 40 FR  DISTANCE FROM PYLORUS: 10 CM  STAPLE LINE REINFORCEMENT: NO  STAPLE LINE OVERSEW: NO  COMORBIDITIES:   Past Medical History:   Diagnosis Date     Earache or other ear, nose, or throat complaint 12/15    tyroid       INDICATIONS FOR PROCEDURE  Hialria Bonner is a 28 year old female who is morbidly obese.  Numerous weight loss attempts without surgery have been without success.     After understanding the risks and benefits of proceeding with a laparoscopic vertical sleeve gastrectomy, she agreed to an operation as outlined by me.    I reviewed the risks of surgery with Hilaria Bonner.    These include, but are not limited to, death, myocardial infarction, pneumonia, urinary tract infection, deep venous thrombosis with or without pulmonary embolus, abdominal infection from bowel injury or abscess, bowel obstruction, wound infection, and bleeding.    More specific risks related to vertical sleeve gastrectomy were detailed at the bariatric informational seminar and include the followin.) leak at the vertical sleeve staple line, 2.) stricture in the  "sleeve, 3.) nausea, vomiting, and dehydration for several months, 4.) adhesions causing bowel obstruction, 5.) rapid weight loss causing a higher rate of gallstone formation during the first 6 months after surgery, 6.) decreased absorption of vitamins because of the reduced stomach size, 7.) weight regain if inappropriate food intake occurs.    The BMI that we are treating this patient for was measured at the initial consultation visit in our bariatric program and it was: 51.3 kg/m2 (as calculated just below).      height of 5' 5.748\", a weight of 315 lbs 6.4 oz, and calculated Body mass index of 51.3 kg/(m^2).      The initial consult height, weight, and BMI are as follows:    Height: 5' 6\"   BARIATRIC WEIGHT TRACKING 12/27/2018   Initial Weight 315 lbs 6 oz       Our weight loss surgery program requires weight loss prior to bariatric surgery and currently the height, weight, and BMI are as follows:    Height: 167.6 cm (5' 6\"), Weight: 132.1 kg (291 lb 3.6 oz), and currently the Body mass index is 47.01 kg/m .    Due to the patient's comorbidity condition of htn in association with elevated body mass index, bariatric surgery has been recommended and is being performed today.    Moreover, as the surgeon performing this procedure, I certify that the following are true in regards to this patient at or prior to the day of surgery:    1. The patient's body mass index (BMI) is or has been greater than or equal to 35 kg/m2.  2. The patient has at least one co-morbidity related to obesity (as outlined above).  3. The patient has been previously unsuccessful with medical treatment for obesity.  Please note that some of this information has been documented as part of a comprehensive pre-bariatric surgery process in the outpatient clinic and is NOT immediately available in the inpatient encounter for this bariatric operation.      OPERATIVE PROCEDURE:     Hilaria Bonner was brought to the operating room and prepared in " routine fashion. Under the benefits of general anesthesia, a left upper quadrant Veress needle was inserted and pneumoperitoneum was established using carbon dioxide gas to a maximum pressure of 15 mmHg. A total of five ports were placed into the abdomen.     A liver retractor was placed through the rightmost port and this provided a view of the upper stomach. The operation was started by dividing the short gastric vessels off the greater curvature of the stomach. This dissection was carried up to the angle of His, and harmonic dissector was used for hemostasis.       A bougie (size noted above) was passed into the stomach and I used 5 blue loads of the [employer] flex linear cutter stapler device to create a vertical sleeve gastrectomy with the bougie as a template. The bougie was removed.    The sleeve gastrectomy specimen (partial gastrectomy) was now removed from the abdomen through the 15 mm port.     Hemostasis was secured, and the liver retractor and all ports were removed from the abdomen under direct visualization.     Skin incisions were closed using skin staples, and sterile dressings were placed.     I was present for all critical components of the operation and all needle and sponge counts were correct x2 at the end of the procedure.    Luan Lopez MD  Surgery  [phone] (hospital )

## 2019-03-26 NOTE — ANESTHESIA CARE TRANSFER NOTE
Patient: Hilaria Bonner    Procedure(s):  Laparoscopic Sleeve Gastrectomy    Diagnosis: Morbid Obesity  Diagnosis Additional Information: No value filed.    Anesthesia Type:   No value filed.     Note:  Airway :Face Mask  Patient transferred to:PACU  Comments: To PACU, VSS, pt awake and alert, exchanging well, report to RN, care accepted.Handoff Report: Identifed the Patient, Identified the Reponsible Provider, Reviewed the pertinent medical history, Discussed the surgical course, Reviewed Intra-OP anesthesia mangement and issues during anesthesia, Set expectations for post-procedure period and Allowed opportunity for questions and acknowledgement of understanding      Vitals: (Last set prior to Anesthesia Care Transfer)    CRNA VITALS  3/26/2019 1623 - 3/26/2019 1700      3/26/2019             Resp Rate (observed):  28                Electronically Signed By: RG Munroe CRNA  March 26, 2019  5:00 PM

## 2019-03-27 VITALS
RESPIRATION RATE: 18 BRPM | TEMPERATURE: 98 F | DIASTOLIC BLOOD PRESSURE: 77 MMHG | OXYGEN SATURATION: 100 % | BODY MASS INDEX: 47.09 KG/M2 | SYSTOLIC BLOOD PRESSURE: 139 MMHG | HEIGHT: 66 IN | WEIGHT: 293 LBS | HEART RATE: 78 BPM

## 2019-03-27 LAB — GLUCOSE BLDC GLUCOMTR-MCNC: 104 MG/DL (ref 70–99)

## 2019-03-27 PROCEDURE — 25000132 ZZH RX MED GY IP 250 OP 250 PS 637: Performed by: SURGERY

## 2019-03-27 PROCEDURE — 25000128 H RX IP 250 OP 636: Performed by: SURGERY

## 2019-03-27 PROCEDURE — 25000132 ZZH RX MED GY IP 250 OP 250 PS 637: Performed by: ANESTHESIOLOGY

## 2019-03-27 PROCEDURE — 25000131 ZZH RX MED GY IP 250 OP 636 PS 637: Performed by: SURGERY

## 2019-03-27 PROCEDURE — 00000146 ZZHCL STATISTIC GLUCOSE BY METER IP

## 2019-03-27 PROCEDURE — 90686 IIV4 VACC NO PRSV 0.5 ML IM: CPT | Performed by: SURGERY

## 2019-03-27 RX ORDER — ACETAMINOPHEN 325 MG/1
650 TABLET ORAL EVERY 4 HOURS PRN
Qty: 100 TABLET | Refills: 0 | Status: SHIPPED | OUTPATIENT
Start: 2019-03-27 | End: 2019-09-25

## 2019-03-27 RX ORDER — OXYCODONE HYDROCHLORIDE 5 MG/1
5-10 TABLET ORAL EVERY 6 HOURS PRN
Qty: 20 TABLET | Refills: 0 | Status: SHIPPED | OUTPATIENT
Start: 2019-03-27 | End: 2019-04-04

## 2019-03-27 RX ORDER — ONDANSETRON 4 MG/1
4 TABLET, ORALLY DISINTEGRATING ORAL EVERY 6 HOURS PRN
Qty: 28 TABLET | Refills: 1 | Status: SHIPPED | OUTPATIENT
Start: 2019-03-27 | End: 2019-04-30

## 2019-03-27 RX ORDER — HYOSCYAMINE SULFATE 0.125 MG
125 TABLET ORAL EVERY 4 HOURS PRN
Qty: 80 TABLET | Refills: 1 | Status: SHIPPED | OUTPATIENT
Start: 2019-03-27 | End: 2019-05-30

## 2019-03-27 RX ORDER — AMOXICILLIN 250 MG
2 CAPSULE ORAL 2 TIMES DAILY
Qty: 28 TABLET | Refills: 0 | Status: SHIPPED | OUTPATIENT
Start: 2019-03-27 | End: 2019-06-20

## 2019-03-27 RX ADMIN — OXYCODONE HYDROCHLORIDE 5 MG: 5 SOLUTION ORAL at 08:21

## 2019-03-27 RX ADMIN — SENNOSIDES AND DOCUSATE SODIUM 2 TABLET: 8.6; 5 TABLET ORAL at 09:23

## 2019-03-27 RX ADMIN — TOPIRAMATE 75 MG: 50 TABLET ORAL at 09:23

## 2019-03-27 RX ADMIN — ONDANSETRON 4 MG: 4 TABLET, ORALLY DISINTEGRATING ORAL at 08:26

## 2019-03-27 RX ADMIN — OXYCODONE HYDROCHLORIDE 5 MG: 5 SOLUTION ORAL at 02:07

## 2019-03-27 RX ADMIN — OXYCODONE HYDROCHLORIDE 5 MG: 5 SOLUTION ORAL at 12:46

## 2019-03-27 RX ADMIN — INFLUENZA A VIRUS A/MICHIGAN/45/2015 X-275 (H1N1) ANTIGEN (FORMALDEHYDE INACTIVATED), INFLUENZA A VIRUS A/SINGAPORE/INFIMH-16-0019/2016 IVR-186 (H3N2) ANTIGEN (FORMALDEHYDE INACTIVATED), INFLUENZA B VIRUS B/PHUKET/3073/2013 ANTIGEN (FORMALDEHYDE INACTIVATED), AND INFLUENZA B VIRUS B/MARYLAND/15/2016 BX-69A ANTIGEN (FORMALDEHYDE INACTIVATED) 0.5 ML: 15; 15; 15; 15 INJECTION, SUSPENSION INTRAMUSCULAR at 12:40

## 2019-03-27 RX ADMIN — OMEPRAZOLE 20 MG: 20 CAPSULE, DELAYED RELEASE ORAL at 09:22

## 2019-03-27 RX ADMIN — ENOXAPARIN SODIUM 40 MG: 40 INJECTION SUBCUTANEOUS at 12:40

## 2019-03-27 ASSESSMENT — PAIN DESCRIPTION - DESCRIPTORS
DESCRIPTORS: ACHING
DESCRIPTORS: ACHING;SORE

## 2019-03-27 ASSESSMENT — ACTIVITIES OF DAILY LIVING (ADL)
ADLS_ACUITY_SCORE: 10

## 2019-03-27 ASSESSMENT — MIFFLIN-ST. JEOR: SCORE: 2090.3

## 2019-03-27 NOTE — PLAN OF CARE
VSS.  Taking slow clears without pain or nausea.  Voiding adequate.  Pain controlled with oxycodone.  UAL in halls and passing flatus.  Abdomen obese, soft.  Staples in lap sites removed per MD and steri strips intact.  Discharge instructions reviewed and patient verbalized understanding of follow up plans, specifically diet restrictions and post op notifiers.  Discharged to home.

## 2019-03-27 NOTE — PLAN OF CARE
VSS. Pt up with SBA. Ambulated in halls. Pain controlled with IV dilaudid and oxycodone X1. MIV infusing through PIV. Tolerating small amounts of clear liquid diet. Voids spont with adequate UOP. Cont. POC.

## 2019-03-27 NOTE — PHARMACY-CONSULT NOTE
Bariatric Consult    Medications evaluated as requested per Bariatric Consult. Medications available as liquid formulations have been dispensed when possible.    No medication changes were needed based on the Bariatric Medication Management Policy.    The pharmacist will continue to follow as new medications are ordered.  Yelena Reyes PharmD [phone] (text capable)

## 2019-03-27 NOTE — ANESTHESIA POSTPROCEDURE EVALUATION
Anesthesia POST Procedure Evaluation    Patient: Hilaria Bonner   MRN:     [id number] Gender:   female   Age:    28 year old :      1990        Preoperative Diagnosis: Morbid Obesity   Procedure(s):  Laparoscopic Sleeve Gastrectomy   Postop Comments: No value filed.       Anesthesia Type:  General    Reportable Event: NO     PAIN: Uncomplicated   Sign Out status: Comfortable, Well controlled pain     PONV: No PONV   Sign Out status:  No Nausea or Vomiting     Neuro/Psych: Uneventful perioperative course   Sign Out Status: Preoperative baseline; Age appropriate mentation     Airway/Resp.: Uneventful perioperative course   Sign Out Status: Non labored breathing, age appropriate RR; Resp. Status within EXPECTED Parameters     CV: Uneventful perioperative course   Sign Out status: Appropriate BP and perfusion indices; Appropriate HR/Rhythm     Disposition:   Sign Out in:  PACU  Disposition:  Phase II; Home  Recovery Course: Uneventful  Follow-Up: Not required           Last Anesthesia Record Vitals:  CRNA VITALS  3/26/2019 1623 - 3/26/2019 1723      3/26/2019             NIBP:  160/86    Ht Rate:  81    SpO2:  100 %    Resp Rate (observed):  16    EKG:  Sinus rhythm          Last PACU/Preop Vitals:  Vitals:    19 1830 19 1845 19 1900   BP: 131/78 124/82 129/72   Pulse: 77 77 78   Resp: 15 12 16   Temp: 36.8  C (98.2  F)  36.6  C (97.9  F)   SpO2: 97% 97% 96%         Electronically Signed By: Deonte Chaves MD, 2019, 7:15 PM

## 2019-03-27 NOTE — PROGRESS NOTES
POST OP CHECK     S: Patient doing well since surgery. Pain is well controlled. Tolerating CLD without nausea/vomiting. NO flatus yet. Voiding spontaneously without issue. Has walked in [place]ibald. Overall doing well.    Vitals:    03/26/19 2130 03/26/19 2243 03/26/19 2345 03/27/19 0045   BP: 146/84 (!) 155/94  144/76   BP Location: Right arm Right arm  Right arm   Pulse:       Resp: 20 15 17 21   Temp:  97.4  F (36.3  C)     TempSrc:  Oral     SpO2: 99% 100% 100% 100%   Weight:       Height:           I/O last 3 completed shifts:  In: 1401.25 [P.O.:120; I.V.:1281.25]  Out: 270 [Urine:250; Blood:20]    AAOx3, no acute distress, sitting up in bed, appears comfortable  Non labored breathing on 2L NC  RRR, non-cyanotic   Abdomen is soft, non-distended, appropriately tender to palpation, incisions with c/d/i dressings  Warm BLE, no edema     A/P: Ms. Bonner is a 28 year old female with history of obesity who is now POD#0 s/p laparoscopic sleeve gastrectomy. No acute postoperative issues.    - Continue plan of cares per primary team  - Bariatric CLD  - IVF until taking in adequate PO  - Pain control with Tylenol, oxycodone prn, IV dilaudid prn  - Zofran and compazine prn nausea  - Lovenox and PPI starts in the morning    Citlalli See MD (PGY-1)  General Surgery Resident  Cross Cover

## 2019-03-27 NOTE — PLAN OF CARE
POD #1. VSS on 2L NC. Capno WNL. Up SBA; ambulating in halls. Incision covered; CDI. Pain is managed with PRN 5mg Oral Solution Oxycodone and heating pad. IVMF through PIV. Tolerating clear liquid diet; denies nausea. Voiding spontaneously with marginal urinary output. Denies passing gas. Continue POC.

## 2019-03-28 ENCOUNTER — TELEPHONE (OUTPATIENT)
Dept: SURGERY | Facility: CLINIC | Age: 29
End: 2019-03-28

## 2019-03-28 LAB — COPATH REPORT: NORMAL

## 2019-03-28 NOTE — TELEPHONE ENCOUNTER
Bariatric Surgery Post op Call      48-72 Hour Post-Op Follow Up:     Ms. Hilaria Bonner is a 28 year old female who underwent laparoscopic sleeve gastrectomy on 2019 with Dr. Lopez for a history of obesity.  Spoke with Patient.    Support  Patient able to care for self independently    Coughing/Deep Breathing:yes  Pain ratin/10    (If patient needs additional pain medication and Tylenol is not contraindicated, then ok  advise patient to take ES Tylenol 2 tablets every 6 hours while symptoms last, not to exceed 6 caplets in 24 hours).    Leg swelling: no  Dizzy/lightheaded?: no  Nausea/Vomiting: none  Passing flatus: no  Having BM s: no  Activity Level: taking it easy  Fluid Intake: adequate Confirm drinking full liquid diet or clear liquid  Concerns: no concerns.       Do you have any questions about medications that you were discharged on from the hospital?  none          Activity/Restrictions  Other taking it easy .    Equipment  other none    Activity/Restrictions  Patient following lifting restrictions. and Following restrictions outlined by surgeon.     Whom and When to Call  Patient acknowledges understanding of how to manage any medication changes and when to seek medical care.      Patient advised that if after hour medical concerns arise to please call [phone] and choose option 4 to speak to the physician on call.      How was your hospital stay/concerns/positive/negative?    Confirm with patient their follow-up appointment date and time?   Date: 2019   Time: 12:30     Time spent with patient: 15 minutes

## 2019-03-29 ENCOUNTER — TELEPHONE (OUTPATIENT)
Dept: FAMILY MEDICINE | Facility: CLINIC | Age: 29
End: 2019-03-29

## 2019-03-29 NOTE — TELEPHONE ENCOUNTER
Patient discharged from [facility] IP  ( Inpatient or ER).    Discharge location: [facility]   Discharge date: 3/27/19  Diagnosis: Morbid (Servere) Obesity Due To Excess Calories (H), Morbid Obesity  Patient has been in the ER/IP 0/1 times.  Care Coord:  NA  Please follow up as appropriate. If no follow up required, please close encounter.

## 2019-03-29 NOTE — TELEPHONE ENCOUNTER
Patient had bariatric surgery on 3/26/19.  All follow up appointments needed have already been scheduled. Patient to follow up with bariatric surgery team if has questions or concerns. Will see surgeon in office next week.  No further outreach will be done from this office, at this time.    Mimi Marques RN

## 2019-04-04 ENCOUNTER — OFFICE VISIT (OUTPATIENT)
Dept: SURGERY | Facility: CLINIC | Age: 29
End: 2019-04-04
Payer: COMMERCIAL

## 2019-04-04 VITALS
WEIGHT: 278.9 LBS | HEART RATE: 92 BPM | DIASTOLIC BLOOD PRESSURE: 78 MMHG | OXYGEN SATURATION: 97 % | HEIGHT: 66 IN | BODY MASS INDEX: 44.82 KG/M2 | TEMPERATURE: 97.6 F | SYSTOLIC BLOOD PRESSURE: 115 MMHG

## 2019-04-04 DIAGNOSIS — Z98.84 S/P LAPAROSCOPIC SLEEVE GASTRECTOMY: Primary | ICD-10-CM

## 2019-04-04 DIAGNOSIS — E66.01 MORBID (SEVERE) OBESITY DUE TO EXCESS CALORIES (H): ICD-10-CM

## 2019-04-04 DIAGNOSIS — E66.01 MORBID OBESITY (H): ICD-10-CM

## 2019-04-04 RX ORDER — TOPIRAMATE 25 MG/1
75 TABLET, FILM COATED ORAL DAILY
Qty: 90 TABLET | Refills: 3 | Status: SHIPPED | OUTPATIENT
Start: 2019-04-04 | End: 2020-03-18

## 2019-04-04 RX ORDER — URSODIOL 300 MG/1
300 CAPSULE ORAL 2 TIMES DAILY
Qty: 60 CAPSULE | Refills: 4 | Status: SHIPPED | OUTPATIENT
Start: 2019-04-04 | End: 2020-03-18

## 2019-04-04 ASSESSMENT — PAIN SCALES - GENERAL: PAINLEVEL: NO PAIN (0)

## 2019-04-04 ASSESSMENT — MIFFLIN-ST. JEOR: SCORE: 2011.58

## 2019-04-04 NOTE — LETTER
"4/4/2019       RE: Hilaria Bonner  [address]  [address]  [facility] [address]     Dear Colleague,    Thank you for referring your patient, Hilaria Bonner, to the [facility] SURGICAL WEIGHT MANAGEMENT at [facility]. Please see a copy of my visit note below.    Postoperative bariatric surgery visit.    Patient underwent sleeve gastrectomy 9 days ago. Reports doing very well.     Tolerating liquids: yes  Lightheadedness: no  Abdominal pain: minimal incisional, controlled with occasional tylenol  Bowel movements: yesterday last BM, going once daily usually  Fevers/shakes/chills: no  GERD: occasional     /78 (BP Location: Left arm, Patient Position: Chair, Cuff Size: Adult Large)   Pulse 92   Temp 97.6  F (36.4  C) (Oral)   Ht 1.676 m (5' 5.98\")   Wt 126.5 kg (278 lb 14.4 oz)   LMP 03/10/2019   SpO2 97%   BMI 45.04 kg/m     NAD  Overall looks good  Incisions c/d/i; dry skin    Plan:  1. RD visit today.  2. Start vitamin supplements per RD directions.  3. Advance diet per RD directions.  4. Follow-up: 3 weeks  5. Actigall prescription sent to pharmacy  6. Plans to start B12 sublingual  7. Pathology reviewed and normal  8. Take omeprazole once daily, if not controlling GERD contact us and possibly increase to twice daily  9. Restart topiramate, patient stopped right before surgery accidentally. Plan for quick ramp back up to 75mg at night.    Joanne Sterling PA-C  Surgical and Medical Weight Management   [email or web address]      "

## 2019-04-04 NOTE — LETTER
4/4/2019       RE: Hilaria Bonner  [address]  [address]  [place] [facility] [address]     Dear Colleague,    Thank you for referring your patient, Hilaria Bonner, to the [facility] SURGICAL WEIGHT MANAGEMENT at [facility]. Please see a copy of my visit note below.    Nutrition Assessment  Reason For Visit:  Hilaria Bonner is a 28 year old female presenting today for nutrition follow-up, 1 week s/p SG with Dr Lopez(3/26/19).  Patient referred by Joanne TOVAR.    Anthropometrics  Initial Consult Weight: 315.4 lbs  Day of Surgery Weight(3/26/19): 291.2 lbs  Current Weight: 278.9 lbs  Weight loss: -36.5 lbs from initial consult; -12.3 lbs from day of surgery    Current Vitamins/Minerals: multivitamin    Nutrition History  Pt reports consuming and tolerating bariatric clear and low-fat full liquid diets. Fluid intake appears adequate, consuming 48-64 oz/day.    Nutrition Prescription:  Grams Protein: 60 (minimum)  Amount of Fluid: 48-64 oz    Nutrition Diagnosis  Food and nutrition-related knowledge deficit r/t lack of prior exposure to diet advancements beyond bariatric low-fat full liquid diet aeb pt unable to verbalize understanding of bariatric pureed and soft diets.    Intervention  Intervention At Appointment:  Materials/education provided on bariatric pureed and soft diets, protein intake, fluid intake, eating pace, portion control, avoiding excess sugar and fat, recommended vitamin/mineral supplements.    Patient Understanding: good  Expected Compliance: good    Goals:  1) Follow bariatric low-fat full liquid diet through day 13 post-op, then to progress to pureed diet x 2 weeks(4/9/19).  If tolerating, may advance on day 29 post-op to bariatric soft diet(4/23/19).   2) Work towards 60 gm protein/day.  3) Consume 48-64 oz fluids daily- between meals.  4) Eat slowly (>20 min/meal), chewing well to smooth consistency once on the bariatric soft diet.  5) Limit portions to  1/2 cup/meal.  6) Start chewable/liquid multivitamin/minerals twice daily.    Follow-Up: 3 weeks or prn    Time spent with patient: 30 minutes.    Susan Stephens RD, LD

## 2019-04-04 NOTE — PROGRESS NOTES
Nutrition Assessment  Reason For Visit:  Hilaria Bonner is a 28 year old female presenting today for nutrition follow-up, 1 week s/p SG with Dr Lopez(3/26/19).  Patient referred by Joanne TOVAR.    Anthropometrics  Initial Consult Weight: 315.4 lbs  Day of Surgery Weight(3/26/19): 291.2 lbs  Current Weight: 278.9 lbs  Weight loss: -36.5 lbs from initial consult; -12.3 lbs from day of surgery    Current Vitamins/Minerals: multivitamin    Nutrition History  Pt reports consuming and tolerating bariatric clear and low-fat full liquid diets. Fluid intake appears adequate, consuming 48-64 oz/day.    Nutrition Prescription:  Grams Protein: 60 (minimum)  Amount of Fluid: 48-64 oz    Nutrition Diagnosis  Food and nutrition-related knowledge deficit r/t lack of prior exposure to diet advancements beyond bariatric low-fat full liquid diet aeb pt unable to verbalize understanding of bariatric pureed and soft diets.    Intervention  Intervention At Appointment:  Materials/education provided on bariatric pureed and soft diets, protein intake, fluid intake, eating pace, portion control, avoiding excess sugar and fat, recommended vitamin/mineral supplements.    Patient Understanding: good  Expected Compliance: good    Goals:  1) Follow bariatric low-fat full liquid diet through day 13 post-op, then to progress to pureed diet x 2 weeks(4/9/19).  If tolerating, may advance on day 29 post-op to bariatric soft diet(4/23/19).   2) Work towards 60 gm protein/day.  3) Consume 48-64 oz fluids daily- between meals.  4) Eat slowly (>20 min/meal), chewing well to smooth consistency once on the bariatric soft diet.  5) Limit portions to 1/2 cup/meal.  6) Start chewable/liquid multivitamin/minerals twice daily.    Follow-Up: 3 weeks or prn    Time spent with patient: 30 minutes.  Susan Stephens, HILARY, LD

## 2019-04-04 NOTE — PATIENT INSTRUCTIONS
Goals:  1) Follow bariatric low-fat full liquid diet through day 13 post-op, then to progress to pureed diet x 2 weeks(4/9/19).  If tolerating, may advance on day 29 post-op to bariatric soft diet(4/23/19).   2) Work towards 60 gm protein/day.  3) Consume 48-64 oz fluids daily- between meals.  4) Eat slowly (>20 min/meal), chewing well to smooth consistency once on the bariatric soft diet.  5) Limit portions to 1/2 cup/meal.  6) Start chewable/liquid multivitamin/minerals twice daily.    Follow up with RD in one month    Susan Stephens RD, LD  If you need to schedule or reschedule with a dietitian please call [phone].

## 2019-04-04 NOTE — PROGRESS NOTES
"Postoperative bariatric surgery visit.    Patient underwent sleeve gastrectomy 9 days ago. Reports doing very well.     Tolerating liquids: yes  Lightheadedness: no  Abdominal pain: minimal incisional, controlled with occasional tylenol  Bowel movements: yesterday last BM, going once daily usually  Fevers/shakes/chills: no  GERD: occasional     /78 (BP Location: Left arm, Patient Position: Chair, Cuff Size: Adult Large)   Pulse 92   Temp 97.6  F (36.4  C) (Oral)   Ht 1.676 m (5' 5.98\")   Wt 126.5 kg (278 lb 14.4 oz)   LMP 03/10/2019   SpO2 97%   BMI 45.04 kg/m    NAD  Overall looks good  Incisions c/d/i; dry skin    Plan:  1. RD visit today.  2. Start vitamin supplements per RD directions.  3. Advance diet per RD directions.  4. Follow-up: 3 weeks  5. Actigall prescription sent to pharmacy  6. Plans to start B12 sublingual  7. Pathology reviewed and normal  8. Take omeprazole once daily, if not controlling GERD contact us and possibly increase to twice daily  9. Restart topiramate, patient stopped right before surgery accidentally. Plan for quick ramp back up to 75mg at night.      Joanne Sterling PA-C  Surgical and Medical Weight Management   [email or web address]      "

## 2019-04-04 NOTE — PATIENT INSTRUCTIONS
1. RD visit today.  2. Start vitamin supplements per RD directions.  3. Advance diet per RD directions.  4. Follow-up: 3 weeks as planned  5. Actigall prescription sent to pharmacy  6. Plans to start B12 sublingual  7. Pathology reviewed and normal  8. Take omeprazole once daily, if not controlling GERD contact us and possibly increase to twice daily  9. Restart topiramate, patient stopped right before surgery accidentally. Plan for quick ramp back up to 75mg at night.

## 2019-04-04 NOTE — NURSING NOTE
"(   Chief Complaint   Patient presents with     Surgical Followup     1 week S/P LSG 3/26/19 with Dr. Lopez    )    ( Weight: 126.5 kg (278 lb 14.4 oz) )  ( Height: 167.6 cm (5' 5.98\") )  ( BMI (Calculated): 45.04 )  ( Initial Weight: 143.1 kg (315 lb 6.4 oz) )  ( Cumulative weight loss (lbs): 36.5 )  ( Last Visits Weight: 134.4 kg (296 lb 3.2 oz) )  ( Wt change since last visit (lbs): -17.3 )  (   )  (   )    ( BP: 115/78 )  (   )  ( Temp: 97.6  F (36.4  C) )  ( Temp src: Oral )  ( Pulse: 92 )  (   )  ( SpO2: 97 % )    (   Patient Active Problem List   Diagnosis     Morbid obesity (H)     Vitamin D deficiency     Elevated parathyroid hormone     Encounter for smoking cessation counseling     Menorrhagia with irregular cycle     Morbid (severe) obesity due to excess calories (H)    )  (   Current Outpatient Medications   Medication Sig Dispense Refill     acetaminophen (TYLENOL) 325 MG tablet Take 2 tablets (650 mg) by mouth every 4 hours as needed for other (For optimal non-opioid multimodal pain management to improve pain control and physical function.) 100 tablet 0     hyoscyamine (ANASPAZ/LEVSIN) 0.125 MG tablet Take 1 tablet (125 mcg) by mouth every 4 hours as needed for cramping (spasms) Take 1-2 tabs every 4 hours as needed for abdominal cramping 80 tablet 1     omeprazole (PRILOSEC) 20 MG DR capsule Take 1 capsule (20 mg) by mouth 2 times daily 28 capsule 0     ondansetron (ZOFRAN-ODT) 4 MG ODT tab Take 1 tablet (4 mg) by mouth every 6 hours as needed for nausea or vomiting 28 tablet 1     senna-docusate (SENOKOT-S/PERICOLACE) 8.6-50 MG tablet Take 2 tablets by mouth 2 times daily 28 tablet 0     oxyCODONE (ROXICODONE) 5 MG tablet Take 1-2 tablets (5-10 mg) by mouth every 6 hours as needed for moderate to severe pain (Patient not taking: Reported on 4/4/2019) 20 tablet 0     topiramate (TOPAMAX) 25 MG tablet Take 3 tablets (75 mg) by mouth 2 times daily (Patient not taking: Reported on 4/4/2019) 180 tablet " 3    )  ( Diabetes Eval:    )    ( Pain Eval:  No Pain (0) )    ( Wound Eval:       )    (   History   Smoking Status     Former Smoker     Years: 1.00     Start date: 11/20/2016     Quit date: 1/12/2018   Smokeless Tobacco     Former User     Quit date: 1/12/2018    )    ( Signed By:  Janki Fry; April 4, 2019; 7:19 AM )

## 2019-04-24 ENCOUNTER — TELEPHONE (OUTPATIENT)
Dept: SURGERY | Facility: CLINIC | Age: 29
End: 2019-04-24

## 2019-04-24 NOTE — TELEPHONE ENCOUNTER
Provider contacted patient and verified that patient had been on omeprazole the 2 weeks following her surgery but was not on it currently. Provider found that the omeprazole prescription she had e scribed at the patient's follow up visit had not actually been received by the pharmacy so patient was not actually on any heartburn medications. Provider called prescription to pharmacy now and notified patient of the circumstance and let her know that a new script had been called in. Patient was told to start by taking 1 capsule by mouth daily and to let us know if she is still experiencing heartburn. If still experiencing heartburn she can increase to 2 capsules by mouth a day after letting us know how the 1 capsule a day is going.

## 2019-04-29 NOTE — PROGRESS NOTES
Nutrition Reassessment  Reason For Visit:  Hilaria Bonner is a 28 year old female presenting today for nutrition follow-up, 1 month s/p SG with Dr Lopez(3/26/19).  Patient referred by Joanne TOVAR.    Anthropometrics  Initial Consult Weight: 315.4 lbs  Day of Surgery Weight(3/26/19): 291.2 lbs  Current Weight: 271 lbs  Weight loss: -44.4 lbs from initial consult; -20.2 lbs from day of surgery    Current Vitamins/Minerals: MVI/minerals BID    Nutrition History:  Recent food recall:  Breakfast: eggs  Lunch: chicken and vegetable  Dinner: protein shake  Snacks: unsweetened applesauce  Beverages: water, crystal light or zeros calorie tea     Progress with Previous Goals:  1) Follow bariatric low-fat full liquid diet through day 13 post-op, then to progress to pureed diet x 2 weeks(4/9/19).  If tolerating, may advance on day 29 post-op to bariatric soft diet(4/23/19). Met more pureed food   2) Work towards 60 gm protein/day. continue   3) Consume 48-64 oz fluids daily- between meals. 6 bottles per day  4) Eat slowly (>20 min/meal), chewing well to smooth consistency once on the bariatric soft diet. Ground and chopped meats feel like the get stuck red meat feels like it sits in stomach   5) Limit portions to 1/2 cup/meal. met   6) Start chewable/liquid multivitamin/minerals twice daily. met     Nutrition Prescription:  Grams Protein: 60 (minimum)  Amount of Fluid: 48-64 oz    Nutrition Diagnosis  Previous: Food and nutrition-related knowledge deficit r/t lack of prior exposure to diet advancements beyond bariatric low-fat full liquid diet aeb pt unable to verbalize understanding of bariatric pureed and soft diets. -resolved    Current: Food and nutrition-related knowledge deficit r/t lack of prior exposure to diet advancements beyond bariatric pureed diet aeb pt unable to verbalize full understanding of bariatric soft and regular consistency diets.    Intervention  Materials/Education provided on bariatric soft and  "regular consistency diets, protein intake, fluid intake, eating pace, chewing foods well, portion control, sugar/fat intake, recommended vitamin/mineral supplements.  Patient reported that she continues to choose more foods that are pureed in texture, encouraged incorporating more soft foods.  Patient reported that chicken and tuna occasionally feels like it is getting \"stuck\" discussed chewing proteins more completely.    Patient Understanding: good  Expected Compliance: good    Goals:  1) Follow soft diet for 4 weeks, then to progress to bariatric regular diet(5/21).   2) Consume 60 grams of protein/day.  3) Sip on 48-64 oz of fluids/day- between meals only.  4) Eat slowly (>20 min/meal), chewing foods well (to applesauce-like consistency).  5) Limit portions to 1/2 cup/meal.  6) Take the following supplements:    Multivitamin/minerals: adult dose 2 times daily    Iron: 45-60 mg elemental (18-36 mg if low risk) - may partly or fully be covered in multivitamin     Calcium Citrate containing vitamin D: 500 mg 3 times daily or 600 mg 2 times daily    Vitamin B12: sublingual form of at least 500 mcg daily or injection of 1000 mcg monthly     B-50 Complex once daily        Follow-Up: prn    Time spent with patient: 15 minutes.  Susan Stephens, RD, LD  "

## 2019-04-30 ENCOUNTER — OFFICE VISIT (OUTPATIENT)
Dept: SURGERY | Facility: CLINIC | Age: 29
End: 2019-04-30
Payer: COMMERCIAL

## 2019-04-30 VITALS
OXYGEN SATURATION: 99 % | SYSTOLIC BLOOD PRESSURE: 133 MMHG | HEART RATE: 92 BPM | DIASTOLIC BLOOD PRESSURE: 95 MMHG | WEIGHT: 271 LBS | HEIGHT: 66 IN | BODY MASS INDEX: 43.55 KG/M2 | TEMPERATURE: 97.8 F

## 2019-04-30 DIAGNOSIS — Z98.84 S/P LAPAROSCOPIC SLEEVE GASTRECTOMY: Primary | ICD-10-CM

## 2019-04-30 DIAGNOSIS — E66.01 MORBID (SEVERE) OBESITY DUE TO EXCESS CALORIES (H): ICD-10-CM

## 2019-04-30 RX ORDER — CHOLECALCIFEROL (VITAMIN D3) 50 MCG
1 TABLET ORAL DAILY
Qty: 90 TABLET | Refills: 1 | Status: ON HOLD | OUTPATIENT
Start: 2019-04-30 | End: 2020-06-10

## 2019-04-30 RX ORDER — ONDANSETRON 4 MG/1
4 TABLET, ORALLY DISINTEGRATING ORAL EVERY 6 HOURS PRN
Qty: 12 TABLET | Refills: 1 | Status: SHIPPED | OUTPATIENT
Start: 2019-04-30 | End: 2020-03-18

## 2019-04-30 RX ORDER — GREEN TEA/HOODIA GORDONII 315-12.5MG
1 CAPSULE ORAL DAILY
Qty: 90 TABLET | Refills: 3 | Status: SHIPPED | OUTPATIENT
Start: 2019-04-30 | End: 2020-01-31

## 2019-04-30 ASSESSMENT — MIFFLIN-ST. JEOR: SCORE: 1975.75

## 2019-04-30 NOTE — PATIENT INSTRUCTIONS
1) Follow soft diet for 4 weeks, then to progress to bariatric regular diet(5/21).   2) Consume 60 grams of protein/day.  3) Sip on 48-64 oz of fluids/day- between meals only.  4) Eat slowly (>20 min/meal), chewing foods well (to applesauce-like consistency).  5) Limit portions to 1/2 cup/meal.  6) Take the following supplements:    Multivitamin/minerals: adult dose 2 times daily    Iron: 45-60 mg elemental (18-36 mg if low risk) - may partly or fully be covered in multivitamin     Calcium Citrate containing vitamin D: 500 mg 3 times daily or 600 mg 2 times daily    Vitamin B12: sublingual form of at least 500 mcg daily or injection of 1000 mcg monthly     B-50 Complex once daily     Follow up with RD in two months    Susan Stephens RD, LD  If you need to schedule or reschedule with a dietitian please call [phone].

## 2019-04-30 NOTE — PROGRESS NOTES
"Postoperative bariatric surgery visit.    Patient underwent sleeve gastrectomy 5 weeks ago.  3/26/19 with Dr. Lopez      Tolerating liquids: yes  Lightheadedness: none  Abdominal pain: none  Bowel movements: every other day, no constipation   Fevers/shakes/chills: none  GERD:  started omperazole once daily last week, improved   Restarted topiramate at 1 week post op- 75mg nightly, helps     Nausea 2 times weekly, zofran helps, sometimes before eating sometimes after eating     Wt Readings from Last 5 Encounters:   04/30/19 122.9 kg (271 lb)   04/04/19 126.5 kg (278 lb 14.4 oz)   03/27/19 134.4 kg (296 lb 3.2 oz)   03/25/19 134 kg (295 lb 8 oz)   03/21/19 136.9 kg (301 lb 14.4 oz)        BP (!) 133/95 (BP Location: Left arm, Patient Position: Sitting, Cuff Size: Adult Large)   Pulse 92   Temp 97.8  F (36.6  C) (Oral)   Ht 1.676 m (5' 5.98\")   Wt 122.9 kg (271 lb)   SpO2 99%   BMI 43.76 kg/m    NAD  Overall looks well  Incisions c/d/i; soft, non-tender    Plan:  1. RD visit today.  2. Start vitamin supplements per RD directions.  3. Advance diet per RD directions.  4. Increase omeprazole to twice daily, concerned that the occasional nausea and stomach spasms are actually reflux    5. Follow-up: 2 months with dietitian, provider, labs.  6. Actigall prescription: continue   7. B12         RG Sequeira CNP   "

## 2019-04-30 NOTE — LETTER
"4/30/2019       RE: Hilaria Bonner  [address]  [address]  [facility] [address]     Dear Colleague,    Thank you for referring your patient, Hilaria Bonner, to the [facility] SURGICAL WEIGHT MANAGEMENT at [facility]. Please see a copy of my visit note below.    Postoperative bariatric surgery visit.    Patient underwent sleeve gastrectomy 5 weeks ago.  3/26/19 with Dr. Lopez      Tolerating liquids: yes  Lightheadedness: none  Abdominal pain: none  Bowel movements: every other day, no constipation   Fevers/shakes/chills: none  GERD:  started omperazole once daily last week, improved   Restarted topiramate at 1 week post op- 75mg nightly, helps     Nausea 2 times weekly, zofran helps, sometimes before eating sometimes after eating     Wt Readings from Last 5 Encounters:   04/30/19 122.9 kg (271 lb)   04/04/19 126.5 kg (278 lb 14.4 oz)   03/27/19 134.4 kg (296 lb 3.2 oz)   03/25/19 134 kg (295 lb 8 oz)   03/21/19 136.9 kg (301 lb 14.4 oz)        BP (!) 133/95 (BP Location: Left arm, Patient Position: Sitting, Cuff Size: Adult Large)   Pulse 92   Temp 97.8  F (36.6  C) (Oral)   Ht 1.676 m (5' 5.98\")   Wt 122.9 kg (271 lb)   SpO2 99%   BMI 43.76 kg/m     NAD  Overall looks well  Incisions c/d/i; soft, non-tender    Plan:  1. RD visit today.  2. Start vitamin supplements per RD directions.  3. Advance diet per RD directions.  4. Increase omeprazole to twice daily, concerned that the occasional nausea and stomach spasms are actually reflux    5. Follow-up: 2 months with dietitian, provider, labs.  6. Actigall prescription: continue   7. B12         RG Sequeira CNP       "

## 2019-04-30 NOTE — LETTER
4/30/2019       RE: Hilaria Bonner  [address]  [address]  [place] [facility] [place] [address]     Dear Colleague,    Thank you for referring your patient, Hilaria Bonner, to the [facility] SURGICAL WEIGHT MANAGEMENT at [facility]. Please see a copy of my visit note below.    Nutrition Reassessment  Reason For Visit:  Hilaria Bonner is a 28 year old female presenting today for nutrition follow-up, 1 month s/p SG with Dr Lopez(3/26/19).  Patient referred by Joanne TOVAR.    Anthropometrics  Initial Consult Weight: 315.4 lbs  Day of Surgery Weight(3/26/19): 291.2 lbs  Current Weight: 271 lbs  Weight loss: -44.4 lbs from initial consult; -20.2 lbs from day of surgery    Current Vitamins/Minerals: MVI/minerals BID    Nutrition History:  Recent food recall:  Breakfast: eggs  Lunch: chicken and vegetable  Dinner: protein shake  Snacks: unsweetened applesauce  Beverages: water, crystal light or zeros calorie tea     Progress with Previous Goals:  1) Follow bariatric low-fat full liquid diet through day 13 post-op, then to progress to pureed diet x 2 weeks(4/9/19).  If tolerating, may advance on day 29 post-op to bariatric soft diet(4/23/19). Met more pureed food   2) Work towards 60 gm protein/day. continue   3) Consume 48-64 oz fluids daily- between meals. 6 bottles per day  4) Eat slowly (>20 min/meal), chewing well to smooth consistency once on the bariatric soft diet. Ground and chopped meats feel like the get stuck red meat feels like it sits in stomach   5) Limit portions to 1/2 cup/meal. met   6) Start chewable/liquid multivitamin/minerals twice daily. met     Nutrition Prescription:  Grams Protein: 60 (minimum)  Amount of Fluid: 48-64 oz    Nutrition Diagnosis  Previous: Food and nutrition-related knowledge deficit r/t lack of prior exposure to diet advancements beyond bariatric low-fat full liquid diet aeb pt unable to verbalize understanding of bariatric pureed and soft diets.  "-resolved    Current: Food and nutrition-related knowledge deficit r/t lack of prior exposure to diet advancements beyond bariatric pureed diet aeb pt unable to verbalize full understanding of bariatric soft and regular consistency diets.    Intervention  Materials/Education provided on bariatric soft and regular consistency diets, protein intake, fluid intake, eating pace, chewing foods well, portion control, sugar/fat intake, recommended vitamin/mineral supplements.  Patient reported that she continues to choose more foods that are pureed in texture, encouraged incorporating more soft foods.  Patient reported that chicken and tuna occasionally feels like it is getting \"stuck\" discussed chewing proteins more completely.    Patient Understanding: good  Expected Compliance: good    Goals:  1) Follow soft diet for 4 weeks, then to progress to bariatric regular diet(5/21).   2) Consume 60 grams of protein/day.  3) Sip on 48-64 oz of fluids/day- between meals only.  4) Eat slowly (>20 min/meal), chewing foods well (to applesauce-like consistency).  5) Limit portions to 1/2 cup/meal.  6) Take the following supplements:    Multivitamin/minerals: adult dose 2 times daily    Iron: 45-60 mg elemental (18-36 mg if low risk) - may partly or fully be covered in multivitamin     Calcium Citrate containing vitamin D: 500 mg 3 times daily or 600 mg 2 times daily    Vitamin B12: sublingual form of at least 500 mcg daily or injection of 1000 mcg monthly     B-50 Complex once daily        Follow-Up: prn    Time spent with patient: 15 minutes.  Susan Stephens, RD, LD        "

## 2019-04-30 NOTE — NURSING NOTE
"(   Chief Complaint   Patient presents with     Surgical Followup     S/P LSG 3/26/19 with Dr. Lopez    )    ( Weight: 122.9 kg (271 lb) )  ( Height: 167.6 cm (5' 5.98\") )  ( BMI (Calculated): 43.76 )  ( Initial Weight: 143.1 kg (315 lb 6.4 oz) )  ( Cumulative weight loss (lbs): 44.4 )  ( Last Visits Weight: 126.5 kg (278 lb 14.4 oz) )  ( Wt change since last visit (lbs): -7.9 )  (   )  (   )    ( BP: (!) 133/95 )  (   )  ( Temp: 97.8  F (36.6  C) )  ( Temp src: Oral )  ( Pulse: 92 )  (   )  ( SpO2: 99 % )    (   Patient Active Problem List   Diagnosis     Morbid obesity (H)     Vitamin D deficiency     Elevated parathyroid hormone     Encounter for smoking cessation counseling     Menorrhagia with irregular cycle     Morbid (severe) obesity due to excess calories (H)    )  (   Current Outpatient Medications   Medication Sig Dispense Refill     acetaminophen (TYLENOL) 325 MG tablet Take 2 tablets (650 mg) by mouth every 4 hours as needed for other (For optimal non-opioid multimodal pain management to improve pain control and physical function.) 100 tablet 0     childrens multivitamin w/iron (FLINTSTONES COMPLETE) 60 MG chewable tablet Take 1 chew tab by mouth daily       hyoscyamine (ANASPAZ/LEVSIN) 0.125 MG tablet Take 1 tablet (125 mcg) by mouth every 4 hours as needed for cramping (spasms) Take 1-2 tabs every 4 hours as needed for abdominal cramping 80 tablet 1     Multiple Vitamins-Minerals (MULTIVITAMIN ADULTS PO) Take 1 tablet by mouth daily       omeprazole (PRILOSEC) 20 MG DR capsule Take 1 capsule (20 mg) by mouth daily 28 capsule 0     ondansetron (ZOFRAN-ODT) 4 MG ODT tab Take 1 tablet (4 mg) by mouth every 6 hours as needed for nausea or vomiting 28 tablet 1     topiramate (TOPAMAX) 25 MG tablet Take 3 tablets (75 mg) by mouth daily 90 tablet 3     ursodiol (ACTIGALL) 300 MG capsule Take 1 capsule (300 mg) by mouth 2 times daily 60 capsule 4     senna-docusate (SENOKOT-S/PERICOLACE) 8.6-50 MG tablet " Take 2 tablets by mouth 2 times daily (Patient not taking: Reported on 4/30/2019) 28 tablet 0    )  ( Diabetes Eval:    )    ( Pain Eval:  Data Unavailable )    ( Wound Eval:       )    (   History   Smoking Status     Former Smoker     Years: 1.00     Start date: 11/20/2016     Quit date: 1/12/2018   Smokeless Tobacco     Former User     Quit date: 1/12/2018    )    ( Signed By:  Janki Fry; April 30, 2019; 11:45 AM )

## 2019-04-30 NOTE — PATIENT INSTRUCTIONS
It was a pleasure meeting with you today.     Thank you for allowing us the privilege of caring for you. We hope we provided you with the excellent service you deserve.     Please let us know if there is anything else we can do for you so that we can be sure you are leaving completely satisfied with your care experience.      You saw RG Sequeira CNP  today.     Instructions per today's visit:   Continue Actigall  Increase Omeprazole to twice daily   Continue topiramate   Start Vitamin as discussed with dietitian     Work on extending meals out to 20-30 minutes by chewing to applesauce consistency. Measure out your food 1/4cup to 1/2 cup    To schedule appointments with our team, please call [phone] option #1    Please call during clinic hours Monday through Friday 8:00a - 4:00p if you have questions or you can contact us via [employer] at anytime.      Nurses: [phone]  Fax: [phone]  Surgery Scheduler: [phone]    Please call the hospital at [phone] to speak with our on call MDs if you have urgent needs after hours, during weekends, or holidays.    *Please note if you need to go to the Emergency Room for any reason in the first 90 days after surgery it is important to go to the [facility] ER on the [place] on [facility] off of the [address] exit off of [address]    Lab results will be communicated through My Chart or letter (if My Chart not used). Please call the clinic if you have not received communication after 1 week or if you have any questions.?    Main follow-up parameters for all bariatric patients     Patients who have undergone Bariatric surgery:    1. Follow-up interval during the first year: ~1 week, 1 month, 3 month, 6 month,12 months.  Every 6 months until 5 years; and then annually thereafter.  The goal of follow-up sessions is to ensure that food intake behavior (choice of food and eating speed) and exercise/activity level are set appropriately.    2. Yearly lab tests  ordered by our clinic.      3. The bariatric team should be aware and potentially evaluate all adverse gastrointestinal symptoms (dysphagia, abdominal pain, nausea, vomiting, diarrhea, heartburn, reflux, etc), which can be a sign of complication.  The bariatric surgeons perform general surgery procedures in addition to bariatric surgery (laparoscopic cholecystectomy, etc.)    4. Inability to tolerate textured food (chicken, steak, fish, eggs, beans) is NOT normal and may need to be evaluated by endoscopy performed by the bariatric surgeon.

## 2019-05-13 ENCOUNTER — TRANSFERRED RECORDS (OUTPATIENT)
Dept: HEALTH INFORMATION MANAGEMENT | Facility: CLINIC | Age: 29
End: 2019-05-13

## 2019-05-13 LAB
ALT SERPL-CCNC: 63 U/L (ref 0–55)
AST SERPL-CCNC: 210 U/L (ref 5–34)
CREAT SERPL-MCNC: 1.06 MG/DL (ref 0.55–1.02)
GLUCOSE SERPL-MCNC: 470 MG/DL (ref 70–99)
INR PPP: 1.1 (ref 0.9–1.2)
POTASSIUM SERPL-SCNC: 3.5 MMOL/L (ref 3.5–5.1)

## 2019-05-15 ENCOUNTER — TELEPHONE (OUTPATIENT)
Dept: SURGERY | Facility: CLINIC | Age: 29
End: 2019-05-15
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
## 2019-05-22 LAB — INR PPP: 1.1 (ref 0.8–1.1)

## 2019-05-23 ENCOUNTER — TELEPHONE (OUTPATIENT)
Dept: CARDIOLOGY | Facility: CLINIC | Age: 29
End: 2019-05-23

## 2019-05-23 ENCOUNTER — TELEPHONE (OUTPATIENT)
Dept: PULMONOLOGY | Facility: CLINIC | Age: 29
End: 2019-05-23

## 2019-05-23 NOTE — TELEPHONE ENCOUNTER
Health Call Center    Phone Message    May a detailed message be left on voicemail: yes    Reason for Call: Other: Pt had Pulmonary Embolism. Is at [facility] currently, needs to F/U with pulmonologist within 5 days of release. Please call mother to discuss.      Action Taken: Message routed to:  [facility] PAUL

## 2019-05-23 NOTE — TELEPHONE ENCOUNTER
[facility]    Phone Message    May a detailed message be left on voicemail: yes    Reason for Call: Other: PT will be discharged from [facility] and will be needing to follow up with a Cardiology. Karen is the only provider listed for Pulmonary Embolism and booked up till August. Please call pt mother to schedule a follow up appt sooner.      Action Taken: Message routed to:  [facility] JOHN Cardiology

## 2019-05-24 ENCOUNTER — TRANSFERRED RECORDS (OUTPATIENT)
Dept: HEALTH INFORMATION MANAGEMENT | Facility: CLINIC | Age: 29
End: 2019-05-24

## 2019-05-24 LAB — INR PPP: 1.1 (ref 0.08–1.1)

## 2019-05-25 LAB — INR PPP: 1.3 (ref 1.8–1.1)

## 2019-05-26 ENCOUNTER — TRANSFERRED RECORDS (OUTPATIENT)
Dept: HEALTH INFORMATION MANAGEMENT | Facility: CLINIC | Age: 29
End: 2019-05-26

## 2019-05-26 LAB
CREAT SERPL-MCNC: 10.99 MG/DL (ref 0.55–1.02)
GLUCOSE SERPL-MCNC: 81 MG/DL (ref 70–99)
INR PPP: 2.4 (ref 0.8–1.1)
POTASSIUM SERPL-SCNC: 3.4 MMOL/L (ref 3.5–5.1)

## 2019-05-27 ENCOUNTER — NURSE TRIAGE (OUTPATIENT)
Dept: NURSING | Facility: CLINIC | Age: 29
End: 2019-05-27

## 2019-05-27 ENCOUNTER — TELEPHONE (OUTPATIENT)
Dept: FAMILY MEDICINE | Facility: CLINIC | Age: 29
End: 2019-05-27

## 2019-05-27 DIAGNOSIS — N28.9 IMPAIRED RENAL FUNCTION: Primary | ICD-10-CM

## 2019-05-27 DIAGNOSIS — I26.99 PULMONARY EMBOLISM (H): ICD-10-CM

## 2019-05-27 NOTE — TELEPHONE ENCOUNTER
High priority.  Labs needed Tuesday, 5/28/2019.  Needs renal providerr immediately    Call from provider Dr Jordan, [phone] or [phone].  in [place], [place].    Cardiac arrest  Acute massive pulmonary embolism  Acute kidney injury    Admitted 5/13/2019 discharged 5/26/2019.    Right heart fully recovered.  Continuing to monitor renal function.    Labs needed every Tuesday starting 5/28/2019 and every Thursday.    INR - if < 3.3 restart warfarin  BMP  Phosphate  Hgb (9.3 yesterday)    I ordered all the above labs except Phosphate. I couldn't do it.   Check labs I did order (I've never done it before).    Creatinine at highest was 12. Was 10.99 at time of discharge.    It's suspected that she may be a hypometabolizer of warfarin.    New Meds:    Warfarin  (held at this time).  Lasix 40/mg/d  Calcium carbonate  Na Bicarb    Routed:P [id number] GUY Cody RN [facility]     Close encounter once addressed

## 2019-05-27 NOTE — TELEPHONE ENCOUNTER
High priority.  Labs needed Tuesday, 5/28/2019.  Needs renal providerr immediately    Call from provider Dr Jordan, [phone] or [phone].  in [place], [place].    Cardiac arrest  Acute massive pulmonary embolism  Acute kidney injury    Admitted 5/13/2019 discharged 5/26/2019.    Right heart fully recovered.  Continuing to monitor renal function.    Labs needed every Tuesday starting 5/28/2019 and every Thursday.    INR - if < 3.3 restart warfarin  BMP  Phosphate  Hgb (9.3 yesterday)    I ordered all the above labs except Phosphate. I couldn't do it.   Check labs I did order (I've never done it before).    Creatinine at highest was 12. Was 10.99 at time of discharge.    It's suspected that she may be a hypometabolizer of warfarin.    New Meds:    Warfarin  (held at this time).  Lasix 40/mg/d  Calcium carbonate  Na Bicarb    Routed:P [id number] GUY Cody RN [facility]     Close encounter once addressed.    .

## 2019-05-28 ENCOUNTER — TELEPHONE (OUTPATIENT)
Dept: FAMILY MEDICINE | Facility: CLINIC | Age: 29
End: 2019-05-28

## 2019-05-28 ENCOUNTER — OFFICE VISIT (OUTPATIENT)
Dept: FAMILY MEDICINE | Facility: CLINIC | Age: 29
End: 2019-05-28
Payer: COMMERCIAL

## 2019-05-28 VITALS
RESPIRATION RATE: 19 BRPM | BODY MASS INDEX: 43.07 KG/M2 | DIASTOLIC BLOOD PRESSURE: 86 MMHG | TEMPERATURE: 98 F | HEIGHT: 66 IN | SYSTOLIC BLOOD PRESSURE: 122 MMHG | OXYGEN SATURATION: 100 % | HEART RATE: 72 BPM | WEIGHT: 268 LBS

## 2019-05-28 DIAGNOSIS — I26.99 ACUTE MASSIVE PULMONARY EMBOLISM (H): Primary | ICD-10-CM

## 2019-05-28 DIAGNOSIS — N17.9 ACUTE KIDNEY INJURY (H): ICD-10-CM

## 2019-05-28 DIAGNOSIS — I26.99 ACUTE MASSIVE PULMONARY EMBOLISM (H): ICD-10-CM

## 2019-05-28 DIAGNOSIS — M62.81 GENERALIZED MUSCLE WEAKNESS: ICD-10-CM

## 2019-05-28 DIAGNOSIS — I46.9 CARDIAC ARREST (H): ICD-10-CM

## 2019-05-28 DIAGNOSIS — N17.9 ACUTE KIDNEY INJURY (H): Primary | ICD-10-CM

## 2019-05-28 LAB
ALBUMIN SERPL-MCNC: 2.1 G/DL (ref 3.4–5)
ALP SERPL-CCNC: 224 U/L (ref 40–150)
ALT SERPL W P-5'-P-CCNC: 41 U/L (ref 0–50)
ANION GAP SERPL CALCULATED.3IONS-SCNC: 12 MMOL/L (ref 3–14)
AST SERPL W P-5'-P-CCNC: 27 U/L (ref 0–45)
BASOPHILS # BLD AUTO: 0.1 10E9/L (ref 0–0.2)
BASOPHILS NFR BLD AUTO: 0.8 %
BILIRUB SERPL-MCNC: 0.6 MG/DL (ref 0.2–1.3)
BUN SERPL-MCNC: 65 MG/DL (ref 7–30)
CALCIUM SERPL-MCNC: 7.2 MG/DL (ref 8.5–10.1)
CHLORIDE SERPL-SCNC: 110 MMOL/L (ref 94–109)
CO2 SERPL-SCNC: 23 MMOL/L (ref 20–32)
CREAT SERPL-MCNC: 6.42 MG/DL (ref 0.52–1.04)
DIFFERENTIAL METHOD BLD: ABNORMAL
EOSINOPHIL # BLD AUTO: 0.1 10E9/L (ref 0–0.7)
EOSINOPHIL NFR BLD AUTO: 1.4 %
ERYTHROCYTE [DISTWIDTH] IN BLOOD BY AUTOMATED COUNT: 19.4 % (ref 10–15)
GFR SERPL CREATININE-BSD FRML MDRD: 8 ML/MIN/{1.73_M2}
GLUCOSE SERPL-MCNC: 93 MG/DL (ref 70–99)
HCT VFR BLD AUTO: 27.1 % (ref 35–47)
HGB BLD-MCNC: 9 G/DL (ref 11.7–15.7)
INR PPP: 1.56 (ref 0.86–1.14)
LYMPHOCYTES # BLD AUTO: 1.9 10E9/L (ref 0.8–5.3)
LYMPHOCYTES NFR BLD AUTO: 29 %
MCH RBC QN AUTO: 34.9 PG (ref 26.5–33)
MCHC RBC AUTO-ENTMCNC: 33.2 G/DL (ref 31.5–36.5)
MCV RBC AUTO: 105 FL (ref 78–100)
MONOCYTES # BLD AUTO: 1 10E9/L (ref 0–1.3)
MONOCYTES NFR BLD AUTO: 15.3 %
NEUTROPHILS # BLD AUTO: 3.5 10E9/L (ref 1.6–8.3)
NEUTROPHILS NFR BLD AUTO: 53.5 %
PHOSPHATE SERPL-MCNC: 5.1 MG/DL (ref 2.5–4.5)
PLATELET # BLD AUTO: 580 10E9/L (ref 150–450)
POTASSIUM SERPL-SCNC: 3.1 MMOL/L (ref 3.4–5.3)
PROT SERPL-MCNC: 7.1 G/DL (ref 6.8–8.8)
RBC # BLD AUTO: 2.58 10E12/L (ref 3.8–5.2)
SODIUM SERPL-SCNC: 145 MMOL/L (ref 133–144)
WBC # BLD AUTO: 6.6 10E9/L (ref 4–11)

## 2019-05-28 PROCEDURE — 85610 PROTHROMBIN TIME: CPT | Performed by: PHYSICIAN ASSISTANT

## 2019-05-28 PROCEDURE — 36415 COLL VENOUS BLD VENIPUNCTURE: CPT | Performed by: PHYSICIAN ASSISTANT

## 2019-05-28 PROCEDURE — 80053 COMPREHEN METABOLIC PANEL: CPT | Performed by: PHYSICIAN ASSISTANT

## 2019-05-28 PROCEDURE — 84100 ASSAY OF PHOSPHORUS: CPT | Performed by: PHYSICIAN ASSISTANT

## 2019-05-28 PROCEDURE — 99215 OFFICE O/P EST HI 40 MIN: CPT | Performed by: PHYSICIAN ASSISTANT

## 2019-05-28 PROCEDURE — 85025 COMPLETE CBC W/AUTO DIFF WBC: CPT | Performed by: PHYSICIAN ASSISTANT

## 2019-05-28 RX ORDER — VITAMIN B COMPLEX
1 TABLET ORAL
COMMUNITY
End: 2020-01-31

## 2019-05-28 RX ORDER — URSODIOL 300 MG/1
300 CAPSULE ORAL
COMMUNITY
End: 2019-05-28 | Stop reason: DRUGHIGH

## 2019-05-28 RX ORDER — FLUTICASONE PROPIONATE 50 MCG
2 SPRAY, SUSPENSION (ML) NASAL
Status: ON HOLD | COMMUNITY
Start: 2019-05-27 | End: 2020-06-10

## 2019-05-28 RX ORDER — CALCIUM CARBONATE 500 MG/1
500 TABLET, CHEWABLE ORAL
COMMUNITY
Start: 2019-05-26 | End: 2019-05-30

## 2019-05-28 RX ORDER — MULTIVITAMIN,THER AND MINERALS
1 TABLET ORAL
COMMUNITY
End: 2020-03-09

## 2019-05-28 RX ORDER — FERROUS SULFATE 325(65) MG
325 TABLET ORAL
COMMUNITY
End: 2020-03-09

## 2019-05-28 RX ORDER — FUROSEMIDE 40 MG
40 TABLET ORAL DAILY
COMMUNITY
Start: 2019-05-27 | End: 2019-05-30

## 2019-05-28 RX ORDER — WARFARIN SODIUM 2 MG/1
4 TABLET ORAL DAILY
Qty: 60 TABLET | Refills: 0 | Status: SHIPPED | OUTPATIENT
Start: 2019-05-28 | End: 2019-06-21

## 2019-05-28 RX ORDER — WARFARIN SODIUM 1 MG/1
1 TABLET ORAL DAILY
Status: CANCELLED | OUTPATIENT
Start: 2019-05-28

## 2019-05-28 RX ORDER — DIPHENHYDRAMINE HYDROCHLORIDE AND LIDOCAINE HYDROCHLORIDE AND ALUMINUM HYDROXIDE AND MAGNESIUM HYDRO
30 KIT
COMMUNITY
Start: 2019-05-26 | End: 2019-05-30

## 2019-05-28 RX ORDER — MULTIVIT WITH MINERALS/LUTEIN
1000 TABLET ORAL
Status: ON HOLD | COMMUNITY
End: 2020-06-10

## 2019-05-28 RX ORDER — SODIUM BICARBONATE 650 MG/1
650 TABLET ORAL
COMMUNITY
Start: 2019-05-26 | End: 2019-05-30

## 2019-05-28 RX ORDER — LANOLIN ALCOHOL/MO/W.PET/CERES
1000 CREAM (GRAM) TOPICAL
COMMUNITY
End: 2020-01-31

## 2019-05-28 RX ORDER — FOLIC ACID 1 MG/1
TABLET ORAL
Status: ON HOLD | COMMUNITY
Start: 2019-05-26 | End: 2020-06-10

## 2019-05-28 ASSESSMENT — PAIN SCALES - GENERAL: PAINLEVEL: NO PAIN (0)

## 2019-05-28 ASSESSMENT — MIFFLIN-ST. JEOR: SCORE: 1958.42

## 2019-05-28 NOTE — LETTER
May 28, 2019      Hilaria Bonner  [address]  [address]  [place] [address]        To Whom It May Concern,       Please excuse Hilaria from work until further notice.  She suffered massive pulmonary embolus and arrest and will not be back to work in the near future.      Sincerely,        Crissy Madrigal PA-C

## 2019-05-28 NOTE — PATIENT INSTRUCTIONS
We will call with you what to do with coumadin.   Continue medications as you have been prescribed.   Follow up with us next week.    Return urgently if any change in symptoms like increasing pain, shortness of breath or other change in symptoms.      Follow up with nephrology, cardiology and pulmonary as soon as possible.

## 2019-05-28 NOTE — TELEPHONE ENCOUNTER
Left voicemail for patient's mother. Pulmonary is currently out to September for new patients. I gave her the number for [facility] scheduling and advised her to reach out to them to see if the patient can get in there sooner.

## 2019-05-28 NOTE — TELEPHONE ENCOUNTER
INR was 1.56- not currently taking coumadin- was held after hospital stay- see chart  INR Was 2.8 on 5/26/19  Had been treated with heparin during hospital stay and thought to be a slow metabolizer of coumadin  Give 4 mg tonight and 4 mg tomorrow and night and schedule visit with INR nurse at [facility] for Thursday 5/30/19 and further adjustments in coumadin at that time.   Creat is still elevated but improved from hospital stay. Potassium was 3.1 likely secondary to Lasix   Consulted with Dr. Davis - will recheck labs while in [facility] clinic on Thursday.    Routing to Dr. Davis to review as well as INR nurse at [facility]     Patient and mother are aware of INR results and follow up with INR [facility] on Thursday

## 2019-05-28 NOTE — TELEPHONE ENCOUNTER
Patient is scheduled for appointment today at  clinic. Message sent to provider as FYI for appointment.    Kait Manriquez RN, [facility]
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
## 2019-05-28 NOTE — TELEPHONE ENCOUNTER
This writer attempted to contact [facility] on 05/28/19      Reason for call needs appointment and left detailed message.      If patient calls back:   Registered Nurse called. Follow Triage Call workflow, SCHEDULE APPOINTMENT FOR TODAY IF AVAILABLE!        Kait Manriquez RN

## 2019-05-29 ENCOUNTER — DOCUMENTATION ONLY (OUTPATIENT)
Dept: LAB | Facility: CLINIC | Age: 29
End: 2019-05-29

## 2019-05-29 ENCOUNTER — PATIENT OUTREACH (OUTPATIENT)
Dept: CARE COORDINATION | Facility: CLINIC | Age: 29
End: 2019-05-29

## 2019-05-29 ENCOUNTER — TELEPHONE (OUTPATIENT)
Dept: ONCOLOGY | Facility: CLINIC | Age: 29
End: 2019-05-29

## 2019-05-29 ENCOUNTER — TELEPHONE (OUTPATIENT)
Dept: INTERNAL MEDICINE | Facility: CLINIC | Age: 29
End: 2019-05-29

## 2019-05-29 NOTE — TELEPHONE ENCOUNTER
ONCOLOGY INTAKE: Records Information      APPT INFORMATION:  Referring provider:  Crissy Madrigla  Referring provider s clinic:  [facility]  Reason for visit/diagnosis:  Acute massive pulmonary embolism (H) [I26.99]  Has patient been notified of appointment date and time?: NA    RECORDS INFORMATION:  Were the records received with the referral (via Rightfax)? No    ADDITIONAL INFORMATION:  Called and spoke to Pt. She requested the phone number because she needs to call back for scheduling after she speaks with her mom. Referral in [facility].

## 2019-05-29 NOTE — PROGRESS NOTES
Patient has a lab appointment on 5/30/2019. Per the lab schedule scrubbing protocol they are not due for anything. Please review chart and send orders or let patient know appointment is not needed.    Thank you,  Estephania Beck

## 2019-05-29 NOTE — TELEPHONE ENCOUNTER
Health Call Center    Phone Message    May a detailed message be left on voicemail: yes    Reason for Call: Other: Amadeo is a PA at [facility] and would likt the Pt to be seen as soon as possible for Acute Kidney Injury. There is nothing available until 7/17/19 so please call her with any possible options as soon as possible     Action Taken: Message routed to:  [facility] ([facility]): She will at the desk today until 3 pm at [phone]

## 2019-05-29 NOTE — TELEPHONE ENCOUNTER
Reviewed with judah Lees to add on tomorrow at 430. Confirmed with Amadeo at [facility]. Message sent to schedulers.  Maddy Jolly LPN  Nephrology  [phone]

## 2019-05-29 NOTE — RESULT ENCOUNTER NOTE
Aleksey Manrique  Here are your lab results from the other night.   Your kidney function is improving but it is very important that you keep your appointment with the nephrologist tomorrow.  Your potassium was a little low so it is important that we recheck this.   One of your liver function tests was very high.   You need to be on the coumadin and should have taken 4 mg last night and 4 mg tonight and further adjustments per the INR team.   Your hemoglobin is low but comparable to when you were in the hospital.  Please call or MyChart my office with any questions or concerns.    Crissy Madrigal, PAC

## 2019-05-29 NOTE — TELEPHONE ENCOUNTER
ONCOLOGY INTAKE: Records Information      APPT INFORMATION: 06/10 w/Musa  Referring provider:  Crissy Madrigal PA-C  Referring provider s clinic:  [facility]  Reason for visit/diagnosis:  Acute massive pulmonary embolism (H) [I26.99]  Has patient been notified of appointment date and time?: yes    RECORDS INFORMATION:  Were the records received with the referral (via [employer])? In [facility]     Has patient been seen for any external appt for this diagnosis? yes    If yes, where? [facility]    Has patient had any imaging or procedures outside of [place] for this condition? yes    If Yes, where? [facility]     ADDITIONAL INFORMATION:  Acute massive pulmonary embolism (H) [I26.99]: caller pt: ref by Crissy Madrigal PA-C:-[facility]peds: Records in Epic  Mother stated records was requested all ready. [facility]

## 2019-05-29 NOTE — PROGRESS NOTES
Clinic Care Coordination Contact  Care Team Conversations    Received clinic information from new PCP in clinic.    28 year old female with cardiac arrest due to massive pulmonary emboli. Received tPa at [facility]. Patient also had acute kidney injury with creatine over 12. Per PCP down to 6 on 5/28/19.    Patient was inpatient at  from 5/13/19 t 5/26/19.   Patient has a nephrology appointment 5/30/19 with lab work.  Multiple referrals were placed by PCP.  She indicates that patient has both mental and speech delays.     Clinic care coordinator called [facility] and was informed that they sent referral to [facility] [employer]. Due to volume of patients.     Clinic care coordinator called [facility]. [phone]. Left message with Aidee RUBI And requested return call as soon as possible.     Clinic care coordinator will continue to follow.     Monica Hernandez RN, [facility] - Primary Care Clinic RN Coordinator  [facility]   5/29/2019    3:03 PM  [phone]

## 2019-05-29 NOTE — TELEPHONE ENCOUNTER
Called and spoke with patient and daughter.     They state they did talk with Crissy and understand the warfarin dosing directions until INR recheck tomorrow.    They would prefer to go to [facility] for management of INR since it is closer to them.     They already have a lab only appointment scheduled at  lab for tomorrow and do not want to change this.     Could provide further contact information for [facility] if patient would prefer to be managed there going forward. Writer attempted but patient's mother wants to wait.    Routing as FYI to [facility] nurses for patient's INR tomorrow.     Kierra Dominguez RN, BSN

## 2019-05-30 ENCOUNTER — TELEPHONE (OUTPATIENT)
Dept: FAMILY MEDICINE | Facility: CLINIC | Age: 29
End: 2019-05-30

## 2019-05-30 ENCOUNTER — OFFICE VISIT (OUTPATIENT)
Dept: NEPHROLOGY | Facility: CLINIC | Age: 29
End: 2019-05-30
Attending: PHYSICIAN ASSISTANT
Payer: COMMERCIAL

## 2019-05-30 ENCOUNTER — ANTICOAGULATION THERAPY VISIT (OUTPATIENT)
Dept: NURSING | Facility: CLINIC | Age: 29
End: 2019-05-30

## 2019-05-30 ENCOUNTER — PRE VISIT (OUTPATIENT)
Dept: NEPHROLOGY | Facility: CLINIC | Age: 29
End: 2019-05-30

## 2019-05-30 VITALS
OXYGEN SATURATION: 100 % | HEIGHT: 66 IN | WEIGHT: 263.8 LBS | SYSTOLIC BLOOD PRESSURE: 119 MMHG | TEMPERATURE: 98.1 F | HEART RATE: 73 BPM | DIASTOLIC BLOOD PRESSURE: 86 MMHG | BODY MASS INDEX: 42.4 KG/M2

## 2019-05-30 DIAGNOSIS — I26.99 PULMONARY EMBOLISM WITH INFARCTION (H): ICD-10-CM

## 2019-05-30 DIAGNOSIS — E87.6 HYPOKALEMIA: ICD-10-CM

## 2019-05-30 DIAGNOSIS — E87.20 METABOLIC ACIDOSIS: ICD-10-CM

## 2019-05-30 DIAGNOSIS — N17.9 ACUTE KIDNEY INJURY (H): ICD-10-CM

## 2019-05-30 DIAGNOSIS — R07.89 OTHER CHEST PAIN: Primary | ICD-10-CM

## 2019-05-30 DIAGNOSIS — I26.99 ACUTE MASSIVE PULMONARY EMBOLISM (H): Primary | ICD-10-CM

## 2019-05-30 DIAGNOSIS — I26.99 ACUTE MASSIVE PULMONARY EMBOLISM (H): ICD-10-CM

## 2019-05-30 LAB
ALBUMIN SERPL-MCNC: 2.4 G/DL (ref 3.4–5)
ALBUMIN UR-MCNC: 30 MG/DL
AMORPH CRY #/AREA URNS HPF: ABNORMAL /HPF
ANION GAP SERPL CALCULATED.3IONS-SCNC: 10 MMOL/L (ref 3–14)
ANION GAP SERPL CALCULATED.3IONS-SCNC: 9 MMOL/L (ref 3–14)
APPEARANCE UR: ABNORMAL
BILIRUB UR QL STRIP: NEGATIVE
BUN SERPL-MCNC: 38 MG/DL (ref 7–30)
BUN SERPL-MCNC: 42 MG/DL (ref 7–30)
CALCIUM SERPL-MCNC: 7.3 MG/DL (ref 8.5–10.1)
CALCIUM SERPL-MCNC: 7.6 MG/DL (ref 8.5–10.1)
CHLORIDE SERPL-SCNC: 110 MMOL/L (ref 94–109)
CHLORIDE SERPL-SCNC: 111 MMOL/L (ref 94–109)
CO2 SERPL-SCNC: 25 MMOL/L (ref 20–32)
CO2 SERPL-SCNC: 25 MMOL/L (ref 20–32)
COLOR UR AUTO: YELLOW
CREAT SERPL-MCNC: 3.14 MG/DL (ref 0.52–1.04)
CREAT SERPL-MCNC: 3.38 MG/DL (ref 0.52–1.04)
CREAT UR-MCNC: 108 MG/DL
ERYTHROCYTE [DISTWIDTH] IN BLOOD BY AUTOMATED COUNT: 19.2 % (ref 10–15)
GFR SERPL CREATININE-BSD FRML MDRD: 18 ML/MIN/{1.73_M2}
GFR SERPL CREATININE-BSD FRML MDRD: 19 ML/MIN/{1.73_M2}
GLUCOSE SERPL-MCNC: 100 MG/DL (ref 70–99)
GLUCOSE SERPL-MCNC: 93 MG/DL (ref 70–99)
GLUCOSE UR STRIP-MCNC: NEGATIVE MG/DL
HCT VFR BLD AUTO: 30.4 % (ref 35–47)
HGB BLD-MCNC: 9.8 G/DL (ref 11.7–15.7)
HGB UR QL STRIP: ABNORMAL
INR BLD: 3 (ref 0.86–1.14)
KETONES UR STRIP-MCNC: NEGATIVE MG/DL
LEUKOCYTE ESTERASE UR QL STRIP: ABNORMAL
MCH RBC QN AUTO: 35 PG (ref 26.5–33)
MCHC RBC AUTO-ENTMCNC: 32.2 G/DL (ref 31.5–36.5)
MCV RBC AUTO: 109 FL (ref 78–100)
MUCOUS THREADS #/AREA URNS LPF: PRESENT /LPF
NITRATE UR QL: NEGATIVE
PH UR STRIP: 6 PH (ref 5–7)
PHOSPHATE SERPL-MCNC: 3.2 MG/DL (ref 2.5–4.5)
PHOSPHATE SERPL-MCNC: 4 MG/DL (ref 2.5–4.5)
PLATELET # BLD AUTO: 593 10E9/L (ref 150–450)
POTASSIUM SERPL-SCNC: 3 MMOL/L (ref 3.4–5.3)
POTASSIUM SERPL-SCNC: 3 MMOL/L (ref 3.4–5.3)
PROT UR-MCNC: 0.65 G/L
PROT/CREAT 24H UR: 0.6 G/G CR (ref 0–0.2)
RBC # BLD AUTO: 2.8 10E12/L (ref 3.8–5.2)
RBC #/AREA URNS AUTO: 2 /HPF (ref 0–2)
SODIUM SERPL-SCNC: 144 MMOL/L (ref 133–144)
SODIUM SERPL-SCNC: 145 MMOL/L (ref 133–144)
SOURCE: ABNORMAL
SP GR UR STRIP: 1.01 (ref 1–1.03)
SQUAMOUS #/AREA URNS AUTO: 2 /HPF (ref 0–1)
UROBILINOGEN UR STRIP-MCNC: 0 MG/DL (ref 0–2)
WBC # BLD AUTO: 8.5 10E9/L (ref 4–11)
WBC #/AREA URNS AUTO: 9 /HPF (ref 0–5)

## 2019-05-30 PROCEDURE — 84100 ASSAY OF PHOSPHORUS: CPT | Performed by: PHYSICIAN ASSISTANT

## 2019-05-30 PROCEDURE — 36415 COLL VENOUS BLD VENIPUNCTURE: CPT | Performed by: PHYSICIAN ASSISTANT

## 2019-05-30 PROCEDURE — 85610 PROTHROMBIN TIME: CPT | Mod: QW | Performed by: PHYSICIAN ASSISTANT

## 2019-05-30 PROCEDURE — G0463 HOSPITAL OUTPT CLINIC VISIT: HCPCS | Mod: ZF

## 2019-05-30 PROCEDURE — 80048 BASIC METABOLIC PNL TOTAL CA: CPT | Performed by: PHYSICIAN ASSISTANT

## 2019-05-30 RX ORDER — OXYCODONE AND ACETAMINOPHEN 5; 325 MG/1; MG/1
1 TABLET ORAL EVERY 6 HOURS PRN
Qty: 30 TABLET | Refills: 0 | Status: SHIPPED | OUTPATIENT
Start: 2019-05-30 | End: 2019-07-31

## 2019-05-30 RX ORDER — DIPHENHYDRAMINE HYDROCHLORIDE AND LIDOCAINE HYDROCHLORIDE AND ALUMINUM HYDROXIDE AND MAGNESIUM HYDRO
30 KIT EVERY 6 HOURS PRN
Qty: 900 ML | Refills: 2 | Status: ON HOLD | OUTPATIENT
Start: 2019-05-30 | End: 2020-06-05

## 2019-05-30 ASSESSMENT — PAIN SCALES - GENERAL: PAINLEVEL: WORST PAIN (10)

## 2019-05-30 ASSESSMENT — ACTIVITIES OF DAILY LIVING (ADL): DEPENDENT_IADLS:: INDEPENDENT

## 2019-05-30 ASSESSMENT — MIFFLIN-ST. JEOR: SCORE: 1939.37

## 2019-05-30 NOTE — TELEPHONE ENCOUNTER
Provider, patient was seen today with INR nurse and then patient called in to clinic with chest pain symptoms and was triaged by fellow RN - this was originally documented in the [facility] encounter. Will route to you as an update - patient advised ED and states she cannot go as she needs to go to nephrology appointment. Patient's appointment is scheduled for 4:30 PM today.     Kierra Dominguez RN, BSN    Copied and pasted triage below:

## 2019-05-30 NOTE — TELEPHONE ENCOUNTER
RECORDS STATUS - ALL OTHER DIAGNOSIS      RECORDS RECEIVED FROM: [facility]     DATE RECEIVED: 6/10/19   NOTES STATUS DETAILS   OFFICE NOTE from referring provider Complete  Crissy Madrigal PA-C       OFFICE NOTE from medical oncologist N/A    DISCHARGE SUMMARY from hospital Complete  5/13/19   DISCHARGE REPORT from the ER N/A    OPERATIVE REPORT N/A    MEDICATION LIST Complete  [facility]   CLINICAL TRIAL TREATMENTS TO DATE N/A    LABS     PATHOLOGY REPORTS N/A    ANYTHING RELATED TO DIAGNOSIS     GENONOMIC TESTING     TYPE:     IMAGING (NEED IMAGES & REPORT)     CT SCANS Complete  [facility]   [phone]    In PACS now    MRI     MAMMO     ULTRASOUND     PET       Action    Action Taken 5/30/19 3:40pm    Called [facility] to request IMG.   IMG Ph: [phone] Fax: [phone]  Sent a formal fax request for CT IMGs.

## 2019-05-30 NOTE — PROGRESS NOTES
Clinic Care Coordination Contact  Care Team Conversations    Called [facility], spoke with Rhiannon.    Provided Rhiannon with contact information for patient and appointment schedule.    [facility] Will see patient 5/31/19 in her home.     Clinic care coordinator will continue to follow and collaborate with home care.    Monica Hernandez RN, [facility] - Primary Care Clinic RN Coordinator  [facility]   5/30/2019    9:30 AM  [phone]

## 2019-05-30 NOTE — PATIENT INSTRUCTIONS
Stop sodium bicarbonate  Stop tums with meals ( can take on empty stomach if needed)  Stop furosemide

## 2019-05-30 NOTE — TELEPHONE ENCOUNTER
Reason for call:  Patient reporting a symptom    Symptom or request: chest pain/chest tightness    Duration (how long have symptoms been present): since last night/early this morning    Have you been treated for this before? Yes    Additional comments: no shortness of breathe - in a lot of pain - needs pain medication    Phone Number patient can be reached at:  Cell number on file:    Telephone Information:   Mobile [phone]       Best Time:  anytime    Can we leave a detailed message on this number:  YES    Call taken on 5/30/2019 at 12:50 PM by Xiomy Martinez

## 2019-05-30 NOTE — TELEPHONE ENCOUNTER
RECORDS RECEIVED FROM: Acute kidney injury / per Maddy's task   DATE RECEIVED: 05.30.2019   NOTES STATUS DETAILS   OFFICE NOTE from referring provider Internal 05.28.2019   OFFICE NOTE from other specialist  N/A    *Only VASCULITIS or LUPUS gather office notes for the following N/A    *PULMONARY   N/A    *ENT N/A    *DERMATOLOGY N/A    *RHEUMATOLOGY N/A    DISCHARGE SUMMARY from hospital N/A    DISCHARGE REPORT from the ER N/A    MEDICATION LIST Internal    IMAGING  (NEED IMAGES AND REPORTS)     KIDNEY CT SCAN N/A    KIDNEY ULTRASOUND N/A    LABS     CBC In process    CMP In process    BMP In process    UA In process    URINE PROTEIN In process    RENAL PANEL In process    BIOPSY     KIDNEY BIOPSY  N/A

## 2019-05-30 NOTE — PROGRESS NOTES
Clinic Care Coordination Contact  Clinic Care Coordination Contact  OUTREACH    Referral Information:  Referral Source: PCP    Primary Diagnosis: Acute MI (heart attack)    [place] Utilization: [facility] from 5/13/19 to 5/27/19  Clinic Utilization  Difficulty keeping appointments:: No  Compliance Concerns: No  No-Show Concerns: No  No PCP office visit in Past Year: No  Utilization    Last refreshed: 5/30/2019  8:43 AM:  Hospital Admissions 1           Last refreshed: 5/30/2019  8:43 AM:  ED Visits 0           Last refreshed: 5/30/2019  8:43 AM:  No Show Count (past year) 0              Current as of: 5/30/2019  8:43 AM          Clinical Concerns:    Current Medical Concerns:  Patient states her chest hurts. This is the same pain she has had from chest compressions. Patient has several fractured ribs. She denies shortness of breath, denies lightheadedness.    Patient states she had lab work done this morning and INR was 3.0. Patient has nephrology appointment this afternoon. She is aware of this.     Informed patient to expect a call from home care today. They will set up a time to see her for tomorrow 5/31/19. Patient states she wants them to help her with her speech. She has delayed, hesitant speech.     Current Behavioral Concerns: na      Education Provided to patient: Seek immediate care if chest pain changes, SOB occurs, diaphoresis, nausea     Pain  Pain (GOAL):: No    Health Maintenance Reviewed:      Clinical Pathway: None    Medication Management:  Patient and her Mom are managing medications     Functional Status:  Dependent ADLs:: Independent  Dependent IADLs:: Independent  Bed or wheelchair confined:: No  Mobility Status: Independent  Fallen 2 or more times in the past year?: No  Any fall with injury in the past year?: No    Living Situation:  Current living arrangement:: I live in a private home with family  Type of residence:: Apartment    Patient is currently living with her mother in a second floor  apartment with no elevator. She states she is able to manage the stairs.      Diet/Exercise/Sleep:  Diet:: No added salt, Low saturated fat, Low cholesterol  Inadequate nutrition (GOAL):: No  Food Insecurity: No  Tube Feeding: No  Exercise:: Unable to exercise  Inadequate activity/exercise (GOAL):: No  Significant changes in sleep pattern (GOAL): No    Transportation:  Transportation concerns (GOAL):: No  Transportation means:: Accessible car, Family, Regular car     Psychosocial:  Jew or spiritual beliefs that impact treatment:: No  Mental health DX:: No  Mental health management concern (GOAL):: No  Informal Support system:: Parent     Financial/Insurance:   Financial/Insurance concerns (GOAL)::No areMA   Resources and Interventions:  Current Resources:   List of home care services:: Skilled Nursing, Physicial Therapy, Speech Therapy, Occupational Therapy;   Community Resources: None  Supplies used at home:: None  Equipment Currently Used at Home: none    Advance Care Plan/Directive  Advanced Care Plans/Directives on file:: No    Patient/Caregiver understanding: Slow processing.   Future Appointments              Today Flori Hernandez MD [facility], [facility]    In 5 days Crissy Madrigal PA-C [facility]    In 6 days Stella Green PT [place] [facility], [facility]    In 1 week Cldye Vigil MD [facility]      Plan: Patient will attend all scheduled appointments.  Home care will establish care.     Clinic care coordinator will outreach in 2-3 weeks.     Monica Hernandez RN, [facility] - Primary Care Clinic RN Coordinator  [facility]   5/30/2019    9:52 AM  [phone]

## 2019-05-30 NOTE — TELEPHONE ENCOUNTER
Calling to confirm if you will follow patient by phone and fax for Home Care with [facility]    kvng [phone]

## 2019-05-30 NOTE — PROGRESS NOTES
Patient had a >9.9 INR on 5/13. Patient did received TPA.                    ANTICOAGULATION FOLLOW-UP CLINIC VISIT    Patient Name:  Hilaria Bonner  Date:  5/30/2019  Contact Type:  Telephone    SUBJECTIVE:  Patient Findings     Positives:   Emergency department visit, Hospital admission    Comments:   See records from [place]        Clinical Outcomes     Negatives:   Major bleeding event, Thromboembolic event, Anticoagulation-related hospital admission, Anticoagulation-related ED visit, Anticoagulation-related fatality    Comments:   See records from [place]           OBJECTIVE    INR Point of Care   Date Value Ref Range Status   05/30/2019 3.0 (H) 0.86 - 1.14 Final     Comment:     This test is intended for monitoring Coumadin therapy.  Results are not   accurate in patients with prolonged INR due to factor deficiency.  This test is intended for monitoring Coumadin therapy.  Results are not   accurate in patients with prolonged INR due to factor deficiency.         ASSESSMENT / PLAN  INR assessment THER    Recheck INR In: 1 DAY    INR Location Clinic      Anticoagulation Summary  As of 5/30/2019    INR goal:   2.0-3.0   TTR:   --   INR used for dosing:   3.0 (5/30/2019)   Warfarin maintenance plan:   No maintenance plan   Full warfarin instructions:   5/30: Hold; Otherwise No maintenance plan   Next INR check:   5/31/2019   Target end date:            Anticoagulation Episode Summary     INR check location:   Anticoagulation Clinic    Preferred lab:       Send INR reminders to:   [facility]    Comments:         Anticoagulation Care Providers     Provider Role Specialty Phone number    Crissy Madrigal PA-C Referring Family Practice [phone]            See the Encounter Report to view Anticoagulation Flowsheet and Dosing Calendar (Go to Encounters tab in chart review, and find the Anticoagulation Therapy Visit)    Dosage adjustment made based on physician directed care plan. Huddle with Agustin Shaikh [facility]  "and he reviewed the records from [place]. He would like patient held tonight with a recheck tomorrow when home care comes out. He is questioning: absorption; liver function; how patient is getting pills; what patient received in the hospital. Reviewed additional information in Care Everywhere and was unable to see MAR and if patient received warfarin in hospital.     Patient had gastric sleeve done about 6 weeks ago. She notes from [place] that she was on a long drive when she had massive PE.    Called number listed for home care in the SW note and advised on the need for INR when they see the patient tomorrow.    Called home number and mother was given the hold for tonight dosing. She is unsure what or if patient received warfarin in the hospital.     Patient called back into clinic for chest pain. She is noting that it feels like someone is sitting on her chest. She is rating the pain 10 out of 10. She states that she has had this pain and is being told that it is from CPR and he cracked ribs. Negative for SOB; radiating pain; cool moist skin. Advised 911 with recent history.  Patient does not want to call. Sat and spoke with patient. She can not give does or/if she received warfarin in hospital. She states she was only told to hold it for 2 days. That she received shots of a 'L\" medication. She notes on and off tingling in her legs. Again advised ER. She states that she can not miss her nephrology appointment this afternoon and will go after.     Kait Manriquez RN                 "

## 2019-05-30 NOTE — LETTER
5/30/2019      RE: Hilaria Bonner  [address]  [address]  [facility] [address]       Assessment and Plan:  28 year old female with history of obesity who recently had a complicated hospitalized with pulmonary embolism and cardiac arrest, ARAMIS with Scr up to >10 now improving (no dialysis) who presents to establish care. She is now staying locally with her mother.  # ARAMIS- her SCr was 0.5 prior to ARAMIS. Her SCr has improved nicely from 6 last week to 3s today.  She has had a very complicated hospitalization. Her renal function will improve over next few weeks. Hope to see it back under 1  - volume is stable, BP normal   - stop lasix  - improving nicely  # Hypertension: BP now normal , no diuretic needed.    # Anemia- acute due to illness and ARAMIS- monitor   - ensure adequate iron stores    # Electrolytes:   - Potassium; level: Low- should improve with stopping diuretic and eating better- recheck in 1 week  - Bicarbonate; level: Normal    # Mineral Bone Disorder:   - Calcium; level is:  Normal   - phosphorus normal    #Pain- she is having a lot of pain. Will prescribe narcotics as that is safest right now due to ARAMIS, she will try tylenol also   - pain should subside in coming days to weeks    Assessment and plan was discussed with patient and she voiced her understanding and agreement.    Consult:  Hilaria Bonner was seen in consultation , referred by Crissy Madrigal,  for ARAMIS.    Reason for Visit:  Hilaria Bonner is a 28 year old female with ARAMIS, who presents for evaluation.    HPI:  She is a very pleasant female who unfortunately had a significant hospitalization recently with massive PE complicated by cardiac arrest.  She had ARAMIS  With SCr up to 12 on 5/24 but now down to 6  Her biggest complaint today is pain. She has been in a lot of pain since weaning off narcotics. She was seen by primary care recently. She has some shortness of breath from rib pain.  She has no swelling and her blood pressure is low  normal  She has low potassium which may be causing some symptoms. She was started on more potassium recently.  She has been on furosemide since hospital.  Reviewed hospital records with her. Her mom accompanies. She is now on coumadin.    Renal History:   ARAMIS 5/2019, creatinine up to 12    ROS:   A comprehensive review of systems was obtained and negative, except as noted in the HPI or PMH.    Active Medical Problems:  Patient Active Problem List   Diagnosis     Morbid obesity (H)     Vitamin D deficiency     Elevated parathyroid hormone     Encounter for smoking cessation counseling     Menorrhagia with irregular cycle     Morbid (severe) obesity due to excess calories (H)     Pulmonary embolism with infarction (H)     PMH:   Medical record was reviewed and PMH was discussed with patient and noted below.  Past Medical History:   Diagnosis Date     Acute kidney injury (H) 05/13/2019     Cardiac arrest (H) 05/13/2019     Earache or other ear, nose, or throat complaint 12/15    tyroid     Pulmonary embolus (H) 05/13/2019     PSH:   Past Surgical History:   Procedure Laterality Date     GYN SURGERY      2 C-sections     KNEE SURGERY  most recently - [id number]    3 surgeries in [place]     LAPAROSCOPIC GASTRIC SLEEVE N/A 3/26/2019    Procedure: Laparoscopic Sleeve Gastrectomy;  Surgeon: Luan Lopez MD;  Location: [facility] OR     ORTHOPEDIC SURGERY      2 knee meniscus surgery       Family Hx:   Family History   Problem Relation Age of Onset     Diabetes Father      Hypertension Father      Breast Cancer Sister 26        surgery only     Cancer Sister      Coronary Artery Disease Other         paternal aunt     Thyroid Disease Other         neice     Coronary Artery Disease Other      Cerebrovascular Disease Other      Breast Cancer Sister      Thyroid Disease Other      Cerebrovascular Disease No family hx of      Personal Hx:   Social History     Tobacco Use     Smoking status: Former Smoker     Years: 1.00     Start  date: 2016     Last attempt to quit: 2018     Years since quittin.3     Smokeless tobacco: Never Used   Substance Use Topics     Alcohol use: Yes     Alcohol/week: 0.0 oz     Comment: occasional       Allergies:  No Known Allergies    Medications:  Current Outpatient Medications   Medication Sig     acetaminophen (TYLENOL) 325 MG tablet Take 2 tablets (650 mg) by mouth every 4 hours as needed for other (For optimal non-opioid multimodal pain management to improve pain control and physical function.)     B Complex Vitamins (VITAMIN-B COMPLEX) TABS Take 1 tablet by mouth     CALCIUM 500/D 500-400 MG-UNIT CHEW      Calcium Citrate-Vitamin D 500-500 MG-UNIT CHEW Take 1 chew tab by mouth 2 times daily     CHANTIX STARTING MONTH AMADO 0.5 MG X 11 & 1 MG X 42 tablet      childrens multivitamin w/iron (FLINTSTONES COMPLETE) 60 MG chewable tablet Take 1 chew tab by mouth daily     Cyanocobalamin (B-12) 500 MCG SUBL Place 1 tablet under the tongue daily     cyanocobalamin (VITAMIN B-12) 1000 MCG tablet Take 1,000 mcg by mouth     ferrous sulfate (FEROSUL) 325 (65 Fe) MG tablet Take 325 mg by mouth     fluticasone (FLONASE) 50 MCG/ACT nasal spray 2 sprays     folic acid (FOLVITE) 1 MG tablet      magic mouthwash (FIRST-MOUTHWASH BLM) compounding kit Take 30 mLs by mouth every 6 hours as needed for mouth sores     Multiple Vitamins-Minerals (MULTIVITAMIN ADULTS PO) Take 1 tablet by mouth daily     omeprazole (PRILOSEC) 20 MG DR capsule Take 1 capsule (20 mg) by mouth 2 times daily     ondansetron (ZOFRAN-ODT) 4 MG ODT tab Take 1 tablet (4 mg) by mouth every 6 hours as needed for nausea or vomiting     oxyCODONE-acetaminophen (PERCOCET) 5-325 MG tablet Take 1 tablet by mouth every 6 hours as needed for moderate pain or severe pain     senna-docusate (SENOKOT-S/PERICOLACE) 8.6-50 MG tablet Take 2 tablets by mouth 2 times daily     topiramate (TOPAMAX) 25 MG tablet Take 3 tablets (75 mg) by mouth daily     ursodiol  "(ACTIGALL) 300 MG capsule Take 1 capsule (300 mg) by mouth 2 times daily     vitamin  s/Minerals TABS Take 1 tablet by mouth     vitamin B complex with vitamin C (VITAMIN  B COMPLEX) tablet Take 1 tablet by mouth daily     vitamin C (ASCORBIC ACID) 1000 MG TABS Take 1,000 mg by mouth     vitamin D3 (CHOLECALCIFEROL) 2000 units tablet Take 1 tablet by mouth daily     vitamin D3 (VITAMIN D3) 1000 units (25 mcg) tablet Take 1,000 Units by mouth     warfarin (COUMADIN) 2 MG tablet Take 2 tablets (4 mg) by mouth daily     Current Facility-Administered Medications   Medication     medroxyPROGESTERone (DEPO-PROVERA) syringe 150 mg      Vitals:  /86   Pulse 73   Temp 98.1  F (36.7  C) (Oral)   Ht 1.67 m (5' 5.75\")   Wt 119.7 kg (263 lb 12.8 oz)   LMP  (LMP Unknown)   SpO2 100%   BMI 42.90 kg/m       Exam:  GENERAL APPEARANCE: significant periorbital ecchyoses  HENT: mouth without ulcers or lesions  LYMPHATICS: no cervical or supraclavicular nodes  RESP: lungs clear to auscultation - no rales, rhonchi or wheezes  CV: regular rhythm, normal rate, no rub, no murmur  EDEMA: no LE edema bilaterally  ABDOMEN: soft, nondistended, nontender, bowel sounds normal  MS: extremities normal - no gross deformities noted, no evidence of inflammation in joints, no muscle tenderness  SKIN: no rash    LABS:   CMP  Recent Labs   Lab Test 06/04/19  1104 05/30/19  1550 05/30/19  0807 05/28/19  1508    144 145* 145*   POTASSIUM 3.5 3.0* 3.0* 3.1*   CHLORIDE 108 110* 111* 110*   CO2 26 25 25 23   ANIONGAP 8 10 9 12   GLC 95 100* 93 93   BUN 9 38* 42* 65*   CR 1.37* 3.14* 3.38* 6.42*   GFRESTIMATED 52* 19* 18* 8*   GFRESTBLACK 60* 22* 20* 9*   QUINCY 8.3* 7.6* 7.3* 7.2*     Recent Labs   Lab Test 05/28/19  1508 01/24/18  1122   BILITOTAL 0.6 0.5   ALKPHOS 224* 125   ALT 41 18   AST 27 21     CBC  Recent Labs   Lab Test 06/04/19  1104 05/30/19  1550 05/28/19  1508 03/26/19  2048 01/24/18  1122   HGB 9.6* 9.8* 9.0*  --  15.3   WBC 9.2 " 8.5 6.6  --  5.5   RBC 2.82* 2.80* 2.58*  --  4.55   HCT 30.8* 30.4* 27.1*  --  45.3   * 109* 105*  --  100   MCH 34.0* 35.0* 34.9*  --  33.6*   MCHC 31.2* 32.2 33.2  --  33.8   RDW 18.3* 19.2* 19.4*  --  13.9   * 593* 580* 267 269     URINE STUDIES  Recent Labs   Lab Test 05/30/19  1548 02/22/18  1515   COLOR Yellow Yellow   APPEARANCE Slightly Cloudy Slightly Cloudy   URINEGLC Negative Negative   URINEBILI Negative Small*   URINEKETONE Negative Negative   SG 1.012 >1.030   UBLD Small* Moderate*   URINEPH 6.0 5.5   PROTEIN 30* Trace*   UROBILINOGEN  --  1.0   NITRITE Negative Negative   LEUKEST Trace* Negative   RBCU 2 2-5*   WBCU 9* O - 2     Recent Labs   Lab Test 05/30/19  1548   UTPG 0.60*     PTH  Recent Labs   Lab Test 05/31/18  1316 03/28/18  1147 01/24/18  1122   PTHI 82* 147* 188*     IRON STUDIES  No lab results found.      Flori Hernandez MD

## 2019-05-30 NOTE — NURSING NOTE
"Chief Complaint   Patient presents with     Consult     acute kidney injury     Vital signs:  Temp: 98.1  F (36.7  C) Temp src: Oral   Pulse: 73     SpO2: 100 %     Height: 167 cm (5' 5.75\") Weight: 119.7 kg (263 lb 12.8 oz)  Estimated body mass index is 42.9 kg/m  as calculated from the following:    Height as of this encounter: 1.67 m (5' 5.75\").    Weight as of this encounter: 119.7 kg (263 lb 12.8 oz).        Maria Eugenia Mazariegos    "

## 2019-05-31 ENCOUNTER — ANTICOAGULATION THERAPY VISIT (OUTPATIENT)
Dept: NURSING | Facility: CLINIC | Age: 29
End: 2019-05-31

## 2019-05-31 ENCOUNTER — TELEPHONE (OUTPATIENT)
Dept: FAMILY MEDICINE | Facility: CLINIC | Age: 29
End: 2019-05-31

## 2019-05-31 LAB — INR PPP: 2.8

## 2019-05-31 NOTE — TELEPHONE ENCOUNTER
Please see the May 31, 2019 Anticoagulation encounter for further information.      Ayana Tay RN, BSN, PHN, [facility]

## 2019-05-31 NOTE — TELEPHONE ENCOUNTER
Patient returned phone call.  Still waiting for home care to return call.   Someone called this morning from home care and still hasn't come today or returned their call.  Is Supposed to check INR today.   Home care called early this am around 9 AM.  Patient's Dad off no later than 230 pm to drive her to [place].  Arranged for appointment with INR RN at [facility] at [place] today for further instructions for INR.     Saw nephrology yesterday and a lot of medications were discontinued.  Has appointment with them in 2 weeks.  Overall doing well.  Chest pain unchanged from hospital stay was given some percocet and has only taken one since yesterday.  Was advised to go to emergency department if any change in symptoms.

## 2019-05-31 NOTE — TELEPHONE ENCOUNTER
Call to patient- no answer left message to call back  Called to get an update on symptoms .  I know that she saw nephrology yesterday and their note was not complete yet.   I am very happy to see that kidney function continues to improve.  I know that coumadin was held but was supposed to return to clinic today for INR recheck and no appointment scheduled.    Potassium was low yesterday and wondered if nephrology saw that and just hoping that discontinuing lasix helpful    Please route to RN since was to have INR checked today

## 2019-05-31 NOTE — TELEPHONE ENCOUNTER
Imaging received from [facility] on 5/31/19  20 Studies  Taken to 2nd floor-Dan for upload on 5/31/19.  LBF

## 2019-05-31 NOTE — TELEPHONE ENCOUNTER
Reason for Call:  INR    Who is calling?  Home Care:     Phone number: [phone]    Fax number:  None    Name of caller: Nancy    INR Value:  2.8    Are there any other concerns:  No    Can we leave a detailed message on this number? YES    Phone number patient can be reached at: Cell number on file:    Telephone Information:   Mobile [phone]         Call taken on 5/31/2019 at 3:38 PM by Con Fontana

## 2019-05-31 NOTE — TELEPHONE ENCOUNTER
See the [facility] visit from yesterday. Home care is coming out to patient today and was informed to do INR.    Kait Manriquez RN, [facility] Triage

## 2019-05-31 NOTE — PROGRESS NOTES
ANTICOAGULATION FOLLOW-UP CLINIC VISIT    Patient Name:  Hilaria Bonner  Date:  5/31/2019  Contact Type:  Telephone/ Gsaxp-[phone]    SUBJECTIVE:  Patient Findings         Clinical Outcomes     Negatives:   Major bleeding event, Thromboembolic event, Anticoagulation-related hospital admission, Anticoagulation-related ED visit, Anticoagulation-related fatality           OBJECTIVE    INR   Date Value Ref Range Status   05/31/2019 2.8  Corrected       ASSESSMENT / PLAN  INR assessment THER    Recheck INR In: 3 DAYS    INR Location [facility]      Anticoagulation Summary  As of 5/31/2019    INR goal:   2.0-3.0   TTR:   --   INR used for dosing:      Warfarin maintenance plan:   No maintenance plan   Full warfarin instructions:   5/31: 4 mg; 6/1: 2 mg; 6/2: 2 mg; Otherwise No maintenance plan   Next INR check:   6/3/2019   Target end date:            Anticoagulation Episode Summary     INR check location:   Anticoagulation Clinic    Preferred lab:       Send INR reminders to:   [facility]    Comments:         Anticoagulation Care Providers     Provider Role Specialty Phone number    Crissy Madrigal PA-C Referring [facility] [phone]            See the Encounter Report to view Anticoagulation Flowsheet and Dosing Calendar (Go to Encounters tab in chart review, and find the Anticoagulation Therapy Visit)    Dosage adjustment made based on physician directed care plan. Home care came out to see patient today. The patient was assessed for diet, medication, and activity level changes, missed or extra doses, bruising or bleeding, with no problem findings. Will recheck in 3 days by home care as patient recently started warfarin and has bounced around from being supra therapeutic to sub therapeutic. Last three days patient has had 8 mg of warfarin and has been in range; will continue 8 mg split up for the next three days.         Ayana Tay RN, BSN, PHN

## 2019-05-31 NOTE — TELEPHONE ENCOUNTER
Reason for Call:  Other call back    Detailed comments: Pt returning phone call she said she received from Crissy Madrigal to follow up with the last visit she had and would like a return phone call to discuss further.    Phone Number Patient can be reached at: Cell number on file:    Telephone Information:   Mobile [phone]       Best Time: anytime    Can we leave a detailed message on this number? YES    Call taken on 5/31/2019 at 12:57 PM by Reno Adrian

## 2019-06-03 ENCOUNTER — NURSE TRIAGE (OUTPATIENT)
Dept: FAMILY MEDICINE | Facility: CLINIC | Age: 29
End: 2019-06-03

## 2019-06-03 ENCOUNTER — ANTICOAGULATION THERAPY VISIT (OUTPATIENT)
Dept: NURSING | Facility: CLINIC | Age: 29
End: 2019-06-03
Payer: COMMERCIAL

## 2019-06-03 ENCOUNTER — TELEPHONE (OUTPATIENT)
Dept: FAMILY MEDICINE | Facility: CLINIC | Age: 29
End: 2019-06-03

## 2019-06-03 LAB — INR PPP: 2 (ref 0.9–1.1)

## 2019-06-03 NOTE — TELEPHONE ENCOUNTER
"    Additional Information    Negative: Difficult to awaken or acting confused (e.g., disoriented, slurred speech)    Negative: Weakness of the face, arm or leg on one side of the body and new onset    Negative: Numbness of the face, arm or leg on one side of the body and new onset    Negative: Loss of speech or garbled speech and new onset    Negative: Passed out (i.e., fainted, collapsed and was not responding)    Negative: Sounds like a life-threatening emergency to the triager    Negative: Unable to walk without falling    Negative: Stiff neck (can't touch chin to chest)    Negative: Possibility of carbon monoxide exposure    Negative: SEVERE headache, states 'worst headache' of life    Negative: SEVERE headache, sudden onset (i.e., reaching maximum intensity within 30 seconds)    Negative: Severe pain in one eye    Negative: Loss of vision or double vision    Negative: Patient sounds very sick or weak to the triager    Negative: New headache and weak immune system (e.g., HIV positive, cancer chemotherapy, chronic steroid treatment)    Negative: Fever present > 3 days (72 hours)    Negative: Patient wants to be seen    Negative: SEVERE headache (e.g., excruciating) and has had severe headaches before    Negative: SEVERE headache and not relieved by pain meds    Negative: SEVERE headache and vomiting    Negative: SEVERE headache and fever    Negative: Fever > 103 F (39.4 C)    Negative: Fever > 100.0 F (37.8 C) and has diabetes mellitus or a weak immune system (e.g., HIV positive, cancer chemotherapy, organ transplant, splenectomy, chronic steroids)    Negative: Followed a head injury within last 3 days    Negative: Traumatic Brain Injury (TBI) is suspected    Negative: Sinus pain of forehead and yellow or green nasal discharge    Negative: Pregnant    Unexplained headache that is present > 24 hours    Answer Assessment - Initial Assessment Questions  1. LOCATION: \"Where does it hurt?\"       Front of head  2. " "ONSET: \"When did the headache start?\" (Minutes, hours or days)       About hour ago  3. PATTERN: \"Does the pain come and go, or has it been constant since it started?\"      constintent  4. SEVERITY: \"How bad is the pain?\" and \"What does it keep you from doing?\"  (e.g., Scale 1-10; mild, moderate, or severe)    - MILD (1-3): doesn't interfere with normal activities     - MODERATE (4-7): interferes with normal activities or awakens from sleep     - SEVERE (8-10): excruciating pain, unable to do any normal activities         6  5. RECURRENT SYMPTOM: \"Have you ever had headaches before?\" If so, ask: \"When was the last time?\" and \"What happened that time?\"       Yesterday, but not as bad as today  6. CAUSE: \"What do you think is causing the headache?\"      Unsure, possibly not enough  7. MIGRAINE: \"Have you been diagnosed with migraine headaches?\" If so, ask: \"Is this headache similar?\"       Long time ago, 10 years  8. HEAD INJURY: \"Has there been any recent injury to the head?\"       yes  9. OTHER SYMPTOMS: \"Do you have any other symptoms?\" (fever, stiff neck, eye pain, sore throat, cold symptoms)      no  10. PREGNANCY: \"Is there any chance you are pregnant?\" \"When was your last menstrual period?\"        No    Kait Manriquez RN, [facility] Triage    Protocols used: HEADACHE-A-OH      "

## 2019-06-03 NOTE — PROGRESS NOTES
ANTICOAGULATION FOLLOW-UP CLINIC VISIT    Patient Name:  Hilaria Bonner  Date:  6/3/2019  Contact Type:  Telephone/ /April (HOME CARE) [phone] and patient    SUBJECTIVE:         OBJECTIVE    INR   Date Value Ref Range Status   2019 2.0 (A) 0.9 - 1.1 Final       ASSESSMENT / PLAN  INR assessment THER    Recheck INR In: 4 DAYS    [facility]R Location [facility]      Anticoagulation Summary  As of 6/3/2019    INR goal:   2.0-3.0   TTR:   --   INR used for dosin.0 (6/3/2019)   Warfarin maintenance plan:   No maintenance plan   Full warfarin instructions:   6/3: 4 mg; 6/4: 2 mg; 6/5: 4 mg; 6/6: 2 mg; Otherwise No maintenance plan   Next INR check:   2019   Target end date:            Anticoagulation Episode Summary     INR check location:   Anticoagulation Clinic    Preferred lab:       Send INR reminders to:   [facility]    Comments:         Anticoagulation Care Providers     Provider Role Specialty Phone number    ScottstacyCrissy PA-C Referring [facility] [phone]            See the Encounter Report to view Anticoagulation Flowsheet and Dosing Calendar (Go to Encounters tab in chart review, and find the Anticoagulation Therapy Visit)    Dosage adjustment made based on physician directed care plan. Increased as patient INR dropped by 0.8 over the weekend. Patient was triaged for headache, please see the 6/3 telephone encounter.    Patient states negative for signs and symptoms of bleeding or blood clots, changes in medication, changes in diet, any signs of infection or antibiotic use, anything new OTC or herbal medications, any missed or extra doses of the warfarin.    Patient informed of the symptoms to be seen for either by INR nurse or ER.      Kait Manriquez RN

## 2019-06-03 NOTE — TELEPHONE ENCOUNTER
Please see the Melani 3, 2019 Anticoagulation encounter for further information.  Kait Manriquez RN, [facility]

## 2019-06-03 NOTE — TELEPHONE ENCOUNTER
Reason for Call:  Other     Detailed comments: INR 2.0 with no concerns    Phone Number Patient can be reached at: Other phone number:  [phone]    Best Time: any    Can we leave a detailed message on this number? YES    Call taken on 6/3/2019 at 3:31 PM by Carina Noel

## 2019-06-03 NOTE — TELEPHONE ENCOUNTER
Patient is rating the pain 6 out of 10, notes higher BP reading at home PT today. HC RN took and got reading of 144/98. Patient is stating that she did not sleep well. She is drinking water. See negatives below.    Patient informed if any changes to go to the ER other wise patient is scheduled in am with PCP.    Kait Manriquez RN, [facility] Triage

## 2019-06-03 NOTE — TELEPHONE ENCOUNTER
Patient told to call to update with blood pressures    Just now it was 144/98  Earlier it was 150/108

## 2019-06-04 ENCOUNTER — TELEPHONE (OUTPATIENT)
Dept: FAMILY MEDICINE | Facility: CLINIC | Age: 29
End: 2019-06-04

## 2019-06-04 ENCOUNTER — PATIENT OUTREACH (OUTPATIENT)
Dept: CARE COORDINATION | Facility: CLINIC | Age: 29
End: 2019-06-04

## 2019-06-04 ENCOUNTER — OFFICE VISIT (OUTPATIENT)
Dept: FAMILY MEDICINE | Facility: CLINIC | Age: 29
End: 2019-06-04
Payer: COMMERCIAL

## 2019-06-04 ENCOUNTER — TELEPHONE (OUTPATIENT)
Dept: CARDIOLOGY | Facility: CLINIC | Age: 29
End: 2019-06-04

## 2019-06-04 VITALS
RESPIRATION RATE: 21 BRPM | HEART RATE: 80 BPM | SYSTOLIC BLOOD PRESSURE: 128 MMHG | BODY MASS INDEX: 42.11 KG/M2 | HEIGHT: 66 IN | OXYGEN SATURATION: 98 % | TEMPERATURE: 98.2 F | WEIGHT: 262 LBS | DIASTOLIC BLOOD PRESSURE: 86 MMHG

## 2019-06-04 DIAGNOSIS — I46.9 CARDIAC ARREST (H): ICD-10-CM

## 2019-06-04 DIAGNOSIS — D50.9 IRON DEFICIENCY ANEMIA, UNSPECIFIED IRON DEFICIENCY ANEMIA TYPE: Primary | ICD-10-CM

## 2019-06-04 DIAGNOSIS — N63.0 BREAST MASS: ICD-10-CM

## 2019-06-04 DIAGNOSIS — Z30.013 ENCOUNTER FOR INITIAL PRESCRIPTION OF INJECTABLE CONTRACEPTIVE: ICD-10-CM

## 2019-06-04 DIAGNOSIS — N17.9 ACUTE KIDNEY INJURY (H): Primary | ICD-10-CM

## 2019-06-04 DIAGNOSIS — I26.99 PULMONARY EMBOLISM WITH INFARCTION (H): Primary | ICD-10-CM

## 2019-06-04 LAB
ANION GAP SERPL CALCULATED.3IONS-SCNC: 8 MMOL/L (ref 3–14)
BASOPHILS # BLD AUTO: 0.1 10E9/L (ref 0–0.2)
BASOPHILS NFR BLD AUTO: 0.8 %
BUN SERPL-MCNC: 9 MG/DL (ref 7–30)
CALCIUM SERPL-MCNC: 8.3 MG/DL (ref 8.5–10.1)
CHLORIDE SERPL-SCNC: 108 MMOL/L (ref 94–109)
CO2 SERPL-SCNC: 26 MMOL/L (ref 20–32)
CREAT SERPL-MCNC: 1.37 MG/DL (ref 0.52–1.04)
DIFFERENTIAL METHOD BLD: ABNORMAL
EOSINOPHIL # BLD AUTO: 0 10E9/L (ref 0–0.7)
EOSINOPHIL NFR BLD AUTO: 0.4 %
ERYTHROCYTE [DISTWIDTH] IN BLOOD BY AUTOMATED COUNT: 18.3 % (ref 10–15)
GFR SERPL CREATININE-BSD FRML MDRD: 52 ML/MIN/{1.73_M2}
GLUCOSE SERPL-MCNC: 95 MG/DL (ref 70–99)
HCG UR QL: NEGATIVE
HCT VFR BLD AUTO: 30.8 % (ref 35–47)
HGB BLD-MCNC: 9.6 G/DL (ref 11.7–15.7)
LYMPHOCYTES # BLD AUTO: 2 10E9/L (ref 0.8–5.3)
LYMPHOCYTES NFR BLD AUTO: 21.8 %
MCH RBC QN AUTO: 34 PG (ref 26.5–33)
MCHC RBC AUTO-ENTMCNC: 31.2 G/DL (ref 31.5–36.5)
MCV RBC AUTO: 109 FL (ref 78–100)
MONOCYTES # BLD AUTO: 0.7 10E9/L (ref 0–1.3)
MONOCYTES NFR BLD AUTO: 7.8 %
NEUTROPHILS # BLD AUTO: 6.4 10E9/L (ref 1.6–8.3)
NEUTROPHILS NFR BLD AUTO: 69.2 %
PLATELET # BLD AUTO: 556 10E9/L (ref 150–450)
POTASSIUM SERPL-SCNC: 3.5 MMOL/L (ref 3.4–5.3)
RBC # BLD AUTO: 2.82 10E12/L (ref 3.8–5.2)
SODIUM SERPL-SCNC: 142 MMOL/L (ref 133–144)
WBC # BLD AUTO: 9.2 10E9/L (ref 4–11)

## 2019-06-04 PROCEDURE — 36415 COLL VENOUS BLD VENIPUNCTURE: CPT | Performed by: PHYSICIAN ASSISTANT

## 2019-06-04 PROCEDURE — 81025 URINE PREGNANCY TEST: CPT | Performed by: PHYSICIAN ASSISTANT

## 2019-06-04 PROCEDURE — 99214 OFFICE O/P EST MOD 30 MIN: CPT | Mod: 25 | Performed by: PHYSICIAN ASSISTANT

## 2019-06-04 PROCEDURE — 85025 COMPLETE CBC W/AUTO DIFF WBC: CPT | Performed by: PHYSICIAN ASSISTANT

## 2019-06-04 PROCEDURE — 80048 BASIC METABOLIC PNL TOTAL CA: CPT | Performed by: PHYSICIAN ASSISTANT

## 2019-06-04 PROCEDURE — 96372 THER/PROPH/DIAG INJ SC/IM: CPT | Performed by: PHYSICIAN ASSISTANT

## 2019-06-04 RX ORDER — MEDROXYPROGESTERONE ACETATE 150 MG/ML
150 INJECTION, SUSPENSION INTRAMUSCULAR
Status: COMPLETED | OUTPATIENT
Start: 2019-06-04 | End: 2019-09-25

## 2019-06-04 RX ADMIN — MEDROXYPROGESTERONE ACETATE 150 MG: 150 INJECTION, SUSPENSION INTRAMUSCULAR at 11:31

## 2019-06-04 ASSESSMENT — PAIN SCALES - GENERAL: PAINLEVEL: SEVERE PAIN (7)

## 2019-06-04 ASSESSMENT — MIFFLIN-ST. JEOR: SCORE: 1931.2

## 2019-06-04 NOTE — TELEPHONE ENCOUNTER
Left voicemail message for patient's mother, Jazmin.  Patient was treated and discharged from [facility] on 5/26/19 for PEA cardiac arrest secondary to Pulmonary Embolism.  Patient's mother had called to establish post hospital care here at the [facility].  Would like to discuss with patient and mother details of patient's admission to  hospital.  If patient is currently residing here in [place] we will work to have her evaluated by vascular medicine.  If she is residing in [place], [place] we can make referral to someone there.  We will follow up when phone call is returned.  Return number left.

## 2019-06-04 NOTE — PATIENT INSTRUCTIONS
Follow up with us in one month for physical and routine visit  Return urgently if any change in symptoms.    Follow up with cardiology at [facility] ([phone]).    Please schedule mammogram and breast ultrasound at [facility] ([phone]).

## 2019-06-04 NOTE — PROGRESS NOTES
Subjective     Hilaria Bonner is a 28 year old female who presents to clinic today for the following health issues:    HPI   Hypertension Follow-up      Do you check your blood pressure regularly outside of the clinic? Yes     Are you following a low salt diet? Yes    Are your blood pressures ever more than 140 on the top number (systolic) OR more   than 90 on the bottom number (diastolic), for example 140/90? Yes       Amount of exercise or physical activity: None    Problems taking medications regularly: No    Medication side effects: none    Diet: regular (no restrictions)        Forms  - [facility]    Had Headache yesterday and found to have elevated blood pressure when physical therapy was there and elevated blood pressure when RN came to visit.  Haven't really been sleeping - scared to go to sleep.  Usually goes to sleep after youngest has been picked up from  because she can hear him in her sleep and is more comfortable.  Afraid she might not wake up .  Knows she will likely need counseling in the future but too much to handle right now   Her mom is currently at home with her.  Not really sleeping at night - toss and turn  High yesterday  physical therapy came yesterday and did my evalution.    Took nap and blood pressure came down.  Headache resolved with nap  No headache today  Chest pain is there-tightness in her chest- unchanged from hospital stay  1-2 bowel movement a day.   Diarrhea is greatly improved.  Urinating ok- lot better   Kidney doctor took off lasix  Her potassium was low and he Told me to eat more fruit and drink more orange juice.  Will not eat bananas -doesn't like them but will eat banana nut bread or banana nut cereal.   Has appointment with nephrology next week.  Has not yet made appointment with cardiology  Has several rib fractures  Has also noticed lumps in both breasts which she is concerned about   Wonders about applying for Social Security  Would like depoprovera for  contraception.  Has not been sexually active with boyfriend since hospitalization  Has [facility] forms with her to complete     Patient Active Problem List   Diagnosis     Morbid obesity (H)     Vitamin D deficiency     Elevated parathyroid hormone     Encounter for smoking cessation counseling     Menorrhagia with irregular cycle     Morbid (severe) obesity due to excess calories (H)     Pulmonary embolism with infarction (H)     Past Surgical History:   Procedure Laterality Date     GYN SURGERY      2 C-sections     KNEE SURGERY  most recently - [id number]    3 surgeries in [place]     LAPAROSCOPIC GASTRIC SLEEVE N/A 3/26/2019    Procedure: Laparoscopic Sleeve Gastrectomy;  Surgeon: Luan Lopez MD;  Location: [facility] OR     ORTHOPEDIC SURGERY      2 knee meniscus surgery       Social History     Tobacco Use     Smoking status: Former Smoker     Years: 1.00     Start date: 2016     Last attempt to quit: 2018     Years since quittin.3     Smokeless tobacco: Never Used   Substance Use Topics     Alcohol use: Yes     Alcohol/week: 0.0 oz     Comment: occasional     Family History   Problem Relation Age of Onset     Diabetes Father      Hypertension Father      Breast Cancer Sister 26        surgery only     Cancer Sister      Coronary Artery Disease Other         paternal aunt     Thyroid Disease Other         neice     Coronary Artery Disease Other      Cerebrovascular Disease Other      Breast Cancer Sister      Thyroid Disease Other      Cerebrovascular Disease No family hx of          Current Outpatient Medications   Medication Sig Dispense Refill     acetaminophen (TYLENOL) 325 MG tablet Take 2 tablets (650 mg) by mouth every 4 hours as needed for other (For optimal non-opioid multimodal pain management to improve pain control and physical function.) 100 tablet 0     B Complex Vitamins (VITAMIN-B COMPLEX) TABS Take 1 tablet by mouth       CALCIUM 500/D 500-400 MG-UNIT CHEW        Calcium  Citrate-Vitamin D 500-500 MG-UNIT CHEW Take 1 chew tab by mouth 2 times daily 180 tablet 3     CHANTIX STARTING MONTH AMADO 0.5 MG X 11 & 1 MG X 42 tablet        childrens multivitamin w/iron (FLINTSTONES COMPLETE) 60 MG chewable tablet Take 1 chew tab by mouth daily       Cyanocobalamin (B-12) 500 MCG SUBL Place 1 tablet under the tongue daily 90 tablet 3     cyanocobalamin (VITAMIN B-12) 1000 MCG tablet Take 1,000 mcg by mouth       ferrous sulfate (FEROSUL) 325 (65 Fe) MG tablet Take 325 mg by mouth       fluticasone (FLONASE) 50 MCG/ACT nasal spray 2 sprays       folic acid (FOLVITE) 1 MG tablet        magic mouthwash (FIRST-MOUTHWASH BLM) compounding kit Take 30 mLs by mouth every 6 hours as needed for mouth sores 900 mL 2     Multiple Vitamins-Minerals (MULTIVITAMIN ADULTS PO) Take 1 tablet by mouth daily       omeprazole (PRILOSEC) 20 MG DR capsule Take 1 capsule (20 mg) by mouth 2 times daily 180 capsule 1     ondansetron (ZOFRAN-ODT) 4 MG ODT tab Take 1 tablet (4 mg) by mouth every 6 hours as needed for nausea or vomiting 12 tablet 1     oxyCODONE-acetaminophen (PERCOCET) 5-325 MG tablet Take 1 tablet by mouth every 6 hours as needed for moderate pain or severe pain 30 tablet 0     senna-docusate (SENOKOT-S/PERICOLACE) 8.6-50 MG tablet Take 2 tablets by mouth 2 times daily 28 tablet 0     topiramate (TOPAMAX) 25 MG tablet Take 3 tablets (75 mg) by mouth daily 90 tablet 3     ursodiol (ACTIGALL) 300 MG capsule Take 1 capsule (300 mg) by mouth 2 times daily 60 capsule 4     vitamin  s/Minerals TABS Take 1 tablet by mouth       vitamin B complex with vitamin C (VITAMIN  B COMPLEX) tablet Take 1 tablet by mouth daily 90 tablet 1     vitamin C (ASCORBIC ACID) 1000 MG TABS Take 1,000 mg by mouth       vitamin D3 (CHOLECALCIFEROL) 2000 units tablet Take 1 tablet by mouth daily 90 tablet 1     vitamin D3 (VITAMIN D3) 1000 units (25 mcg) tablet Take 1,000 Units by mouth       warfarin (COUMADIN) 2 MG tablet Take 2  "tablets (4 mg) by mouth daily 60 tablet 0     BP Readings from Last 3 Encounters:   06/04/19 128/86   05/30/19 119/86   05/28/19 122/86    Wt Readings from Last 3 Encounters:   06/04/19 118.8 kg (262 lb)   05/30/19 119.7 kg (263 lb 12.8 oz)   05/28/19 121.6 kg (268 lb)                      Reviewed and updated as needed this visit by Provider  Tobacco  Allergies  Meds  Problems  Med Hx  Surg Hx  Fam Hx  Soc Hx          Review of Systems   ROS COMP: Constitutional, HEENT, cardiovascular, pulmonary, gi and gu systems are negative, except as otherwise noted.      Objective    /86 (BP Location: Right arm, Patient Position: Chair, Cuff Size: Adult Large)   Pulse 80   Temp 98.2  F (36.8  C) (Oral)   Resp 21   Ht 1.67 m (5' 5.75\")   Wt 118.8 kg (262 lb)   LMP  (LMP Unknown)   SpO2 98%   Breastfeeding? No   BMI 42.61 kg/m    Body mass index is 42.61 kg/m .  Physical Exam   GENERAL: alert, no distress and obese  NECK: no adenopathy, no asymmetry, masses, or scars and thyroid normal to palpation  RESP: lungs clear to auscultation - no rales, rhonchi or wheezes  BREAST: mass bilateral breast   CV: regular rate and rhythm, normal S1 S2, no S3 or S4, no murmur, click or rub, no peripheral edema and peripheral pulses strong  MS: ambulates with walker.  Slow to move.  Difficulty lying down and sitting up due to chest pain  Diffuse tender to palpation of chest  Bruising and hematomas of face and diffuse abdomen    Diagnostic Test Results:  Results for orders placed or performed in visit on 06/04/19 (from the past 24 hour(s))   CBC with platelets differential   Result Value Ref Range    WBC 9.2 4.0 - 11.0 10e9/L    RBC Count 2.82 (L) 3.8 - 5.2 10e12/L    Hemoglobin 9.6 (L) 11.7 - 15.7 g/dL    Hematocrit 30.8 (L) 35.0 - 47.0 %     (H) 78 - 100 fl    MCH 34.0 (H) 26.5 - 33.0 pg    MCHC 31.2 (L) 31.5 - 36.5 g/dL    RDW 18.3 (H) 10.0 - 15.0 %    Platelet Count 556 (H) 150 - 450 10e9/L    % Neutrophils 69.2 % "    % Lymphocytes 21.8 %    % Monocytes 7.8 %    % Eosinophils 0.4 %    % Basophils 0.8 %    Absolute Neutrophil 6.4 1.6 - 8.3 10e9/L    Absolute Lymphocytes 2.0 0.8 - 5.3 10e9/L    Absolute Monocytes 0.7 0.0 - 1.3 10e9/L    Absolute Eosinophils 0.0 0.0 - 0.7 10e9/L    Absolute Basophils 0.1 0.0 - 0.2 10e9/L    Diff Method Automated Method    HCG Qual, Urine ([id number])   Result Value Ref Range    HCG Qual Urine Negative NEG^Negative           Assessment & Plan     1. Acute kidney injury (H)  Has appointment with nephrology next week.  Will recheck potassium and renal function today and start potassium if still low.  Renal function Has improved dramatically since hospitalization  - Basic metabolic panel  - CBC with platelets differential    2. Encounter for initial prescription of injectable contraceptive  Given depo today- due for pap in December   - HCG Qual, Urine ([id number])  - medroxyPROGESTERone (DEPO-PROVERA) syringe 150 mg  - INJECTION INTRAMUSCULAR OR SUB-Q    3. Breast mass  Several lumps bilateral breasts- will image  - MA Diagnostic Digital Bilateral; Future  - US Breast Bilateral Complete 4 Quadrants; Future             Patient Instructions   Follow up with us in one month for physical and routine visit  Return urgently if any change in symptoms.    Follow up with cardiology at [facility] ([phone]).    Please schedule mammogram and breast ultrasound at [facility] ([phone]).               Return in about 1 month (around 7/2/2019), or if symptoms worsen or fail to improve, for Physical Exam, Routine Visit.    Crissy Madrigal PA-C  [facility]

## 2019-06-04 NOTE — PROGRESS NOTES
Clinic Care Coordination Contact  Care Team Conversations    Patient seen today in PCP clinic. Patient was instructed to get a cardiology appointment set up, referral was sent.    Per chart review, cardiology left a message with patient. Potential appointment next week in [facility].     Jazmin, mother of patient, calling clinic requesting a call from PCP. PCP asked RN CC to return call.    Called and left message with Jazmin stating that PCP is not able to get patient in to see cardiology any sooner. They are to take the first available appointment. Cardiology has all of her hospital records from [facility].  Reiterated that they should take first available appointment.    Provided contact information for RN clinic care coordinator to Jazmin on voicemail message.     Monica Hernandez RN, [facility] RN Coordinator  [facility]   6/4/2019    2:41 PM  [phone]

## 2019-06-04 NOTE — TELEPHONE ENCOUNTER
Health Call Center    Phone Message    May a detailed message be left on voicemail: yes    Reason for Call: Other: Patient has a a referral for cardiology but pulmonary embolism and cardiac arrest are not on the protocol list. Patient's mother states she needs to be seen asap. Please advise.     Action Taken: Message routed to:  Adult Clinics: Cardiology p [phone]

## 2019-06-04 NOTE — TELEPHONE ENCOUNTER
Reason for Call:  Other     Detailed comments: Clarification of orders,  Speech evaluation was completed today. Will treat 1 x this week and 2 x 4 additional week if that is ok?    Phone Number Patient can be reached at: Other phone number:  [phone]    Best Time: any    Can we leave a detailed message on this number? YES    Call taken on 6/4/2019 at 3:31 PM by Carina Noel

## 2019-06-04 NOTE — RESULT ENCOUNTER NOTE
Aleksey Manrique  Your potassium was normal.   Your kidney function continues to improve dramatically.   You are still quite anemic.   Please ask the nurse to draw blood for a complete blood count next week- future order has been placed.   Please call or MyChart my office with any questions or concerns.    Crissy Madrigal, PAC

## 2019-06-04 NOTE — TELEPHONE ENCOUNTER
[facility] -   Cardiac Catheterization  Procedure/Discharge Note    SUMMARY     Sukhjinder Kim  [id number]  Primary Doctor No    Date of Procedure: 5/4/2017    Procedure:  1.  RHC  2.  Coronary angiogram    Provider: Adriano Traore MD    Assisting Provider:  Milvia Mejía    Indications: He was referred for cardiac catheterization to further evaluate aortic stenosis.    Pre-Procedure Diagnosis:  Severe AS    Post-Procedure Diagnosis:  same    Anesthesia: RN IV Sedation    Description of the Findings of the Procedure:     The risks, benefits, complications, treatment options, and expected outcomes were discussed with the patient. The patient and/or family concurred with the proposed plan, giving informed consent. Patient was brought to the cath lab after IV hydration was begun and oral premedication was given.     Findings:    Angiographically normal coronary arteries  Mild PHTN  Could not cross aortic valve with wire  Right brachial vein -- manual pressure/ Right radial TR band  See report    Complications: None; patient tolerated the procedure well.    Estimated Blood Loss (EBL): Minimal           Implants: none    Specimens: none    Condition: stable    Disposition: PACU - hemodynamically stable.    Attestation: I was present and scrubbed for the entire procedure.     Recommendations:    Usual post cath care  D/c pt home  Continue current meds  Cardiac diet  F/u with Dr. Cook, referring cardiologist, 1 week.        [facility] forms placed on Dr. Ag desk for completion.  Carolann ESTRELLA, Patient Care

## 2019-06-04 NOTE — TELEPHONE ENCOUNTER
Reviewed patient's medical record.     Unable to get patient an appointment with Cardiology this week at [place].     Noted that Vini Irving, Cardiology RN had previously attempted to reach out to her to discuss appointment. Patient's mother Shady is looking for an appointment as soon as possible.    Will forward to Vini to contact Jazmin back to discuss recommendations that he noted in patient's chart on 5/23/19.    Jaqueline Del Real RN, BSN  Nephrology Care Coordinator  [facility]

## 2019-06-04 NOTE — NURSING NOTE
BP: 128/86    LAST PAP/EXAM:   Lab Results   Component Value Date    PAP NIL 12/06/2016     URINE HCG:negative    The following medication was given:     MEDICATION: Depo Provera 150mg  ROUTE: IM  SITE: Deltoid - Left  : Soha  LOT #: [id number]  EXP:02/2020  NEXT INJECTION DUE: 8/20/19 - 9/3/19

## 2019-06-04 NOTE — TELEPHONE ENCOUNTER
Reason for Call:  Other call back    Detailed comments: Judy called back to change verbal orders needed. 1x a week, 3x aweek for 1 week and 2x a week for 2 weeks.     Phone Number Patient can be reached at: Other phone number:  [phone]    Best Time: anytime    Can we leave a detailed message on this number? YES    Call taken on 6/4/2019 at 4:29 PM by Xiomy Martinez

## 2019-06-05 ENCOUNTER — TELEPHONE (OUTPATIENT)
Dept: FAMILY MEDICINE | Facility: CLINIC | Age: 29
End: 2019-06-05

## 2019-06-05 NOTE — TELEPHONE ENCOUNTER
This writer attempted to contact Judy on 06/05/19      Reason for call home care orders and left detailed message.      If patient calls back:   Registered Nurse called. Follow Triage Call workflow        Summer Terrazas RN

## 2019-06-05 NOTE — PROGRESS NOTES
Assessment and Plan:  28 year old female with history of obesity who recently had a complicated hospitalized with pulmonary embolism and cardiac arrest, ARAMIS with Scr up to >10 now improving (no dialysis) who presents to [facility] care. She is now staying locally with her mother.  # ARAMIS- her SCr was 0.5 prior to ARAMIS. Her SCr has improved nicely from 6 last week to 3s today.  She has had a very complicated hospitalization. Her renal function will improve over next few weeks. Hope to see it back under 1  - volume is stable, BP normal   - stop lasix  - improving nicely  # Hypertension: BP now normal , no diuretic needed.    # Anemia- acute due to illness and ARAMIS- monitor   - ensure adequate iron stores    # Electrolytes:   - Potassium; level: Low- should improve with stopping diuretic and eating better- recheck in 1 week  - Bicarbonate; level: Normal    # Mineral Bone Disorder:   - Calcium; level is:  Normal   - phosphorus normal    #Pain- she is having a lot of pain. Will prescribe narcotics as that is safest right now due to ARAMIS, she will try tylenol also   - pain should subside in coming days to weeks    Assessment and plan was discussed with patient and she voiced her understanding and agreement.    Consult:  Hilaria Bonner was seen in consultation , referred by Crissy Madrigal,  for ARAMIS.    Reason for Visit:  Hilaria Bonner is a 28 year old female with ARAMIS, who presents for evaluation.    HPI:  She is a very pleasant female who unfortunately had a significant hospitalization recently with massive PE complicated by cardiac arrest.  She had ARAMIS  With SCr up to 12 on 5/24 but now down to 6  Her biggest complaint today is pain. She has been in a lot of pain since weaning off narcotics. She was seen by primary care recently. She has some shortness of breath from rib pain.  She has no swelling and her blood pressure is low normal  She has low potassium which may be causing some symptoms. She was started on more  potassium recently.  She has been on furosemide since hospital.  Reviewed hospital records with her. Her mom accompanies. She is now on coumadin.    Renal History:   ARAMIS 2019, creatinine up to 12    ROS:   A comprehensive review of systems was obtained and negative, except as noted in the HPI or PMH.    Active Medical Problems:  Patient Active Problem List   Diagnosis     Morbid obesity (H)     Vitamin D deficiency     Elevated parathyroid hormone     Encounter for smoking cessation counseling     Menorrhagia with irregular cycle     Morbid (severe) obesity due to excess calories (H)     Pulmonary embolism with infarction (H)     PMH:   Medical record was reviewed and PMH was discussed with patient and noted below.  Past Medical History:   Diagnosis Date     Acute kidney injury (H) 2019     Cardiac arrest (H) 2019     Earache or other ear, nose, or throat complaint 12/15    tyroid     Pulmonary embolus (H) 2019     PSH:   Past Surgical History:   Procedure Laterality Date     GYN SURGERY      2 C-sections     KNEE SURGERY  most recently - [id number]    3 surgeries in [place]     LAPAROSCOPIC GASTRIC SLEEVE N/A 3/26/2019    Procedure: Laparoscopic Sleeve Gastrectomy;  Surgeon: Luan Lopez MD;  Location: [facility] OR     ORTHOPEDIC SURGERY      2 knee meniscus surgery       Family Hx:   Family History   Problem Relation Age of Onset     Diabetes Father      Hypertension Father      Breast Cancer Sister 26        surgery only     Cancer Sister      Coronary Artery Disease Other         paternal aunt     Thyroid Disease Other         neice     Coronary Artery Disease Other      Cerebrovascular Disease Other      Breast Cancer Sister      Thyroid Disease Other      Cerebrovascular Disease No family hx of      Personal Hx:   Social History     Tobacco Use     Smoking status: Former Smoker     Years: 1.00     Start date: 2016     Last attempt to quit: 2018     Years since quittin.3      Smokeless tobacco: Never Used   Substance Use Topics     Alcohol use: Yes     Alcohol/week: 0.0 oz     Comment: occasional       Allergies:  No Known Allergies    Medications:  Current Outpatient Medications   Medication Sig     acetaminophen (TYLENOL) 325 MG tablet Take 2 tablets (650 mg) by mouth every 4 hours as needed for other (For optimal non-opioid multimodal pain management to improve pain control and physical function.)     B Complex Vitamins (VITAMIN-B COMPLEX) TABS Take 1 tablet by mouth     CALCIUM 500/D 500-400 MG-UNIT CHEW      Calcium Citrate-Vitamin D 500-500 MG-UNIT CHEW Take 1 chew tab by mouth 2 times daily     CHANTIX STARTING MONTH AMADO 0.5 MG X 11 & 1 MG X 42 tablet      childrens multivitamin w/iron (FLINTSTONES COMPLETE) 60 MG chewable tablet Take 1 chew tab by mouth daily     Cyanocobalamin (B-12) 500 MCG SUBL Place 1 tablet under the tongue daily     cyanocobalamin (VITAMIN B-12) 1000 MCG tablet Take 1,000 mcg by mouth     ferrous sulfate (FEROSUL) 325 (65 Fe) MG tablet Take 325 mg by mouth     fluticasone (FLONASE) 50 MCG/ACT nasal spray 2 sprays     folic acid (FOLVITE) 1 MG tablet      magic mouthwash (FIRST-MOUTHWASH BLM) compounding kit Take 30 mLs by mouth every 6 hours as needed for mouth sores     Multiple Vitamins-Minerals (MULTIVITAMIN ADULTS PO) Take 1 tablet by mouth daily     omeprazole (PRILOSEC) 20 MG DR capsule Take 1 capsule (20 mg) by mouth 2 times daily     ondansetron (ZOFRAN-ODT) 4 MG ODT tab Take 1 tablet (4 mg) by mouth every 6 hours as needed for nausea or vomiting     oxyCODONE-acetaminophen (PERCOCET) 5-325 MG tablet Take 1 tablet by mouth every 6 hours as needed for moderate pain or severe pain     senna-docusate (SENOKOT-S/PERICOLACE) 8.6-50 MG tablet Take 2 tablets by mouth 2 times daily     topiramate (TOPAMAX) 25 MG tablet Take 3 tablets (75 mg) by mouth daily     ursodiol (ACTIGALL) 300 MG capsule Take 1 capsule (300 mg) by mouth 2 times daily     vitamin   "s/Minerals TABS Take 1 tablet by mouth     vitamin B complex with vitamin C (VITAMIN  B COMPLEX) tablet Take 1 tablet by mouth daily     vitamin C (ASCORBIC ACID) 1000 MG TABS Take 1,000 mg by mouth     vitamin D3 (CHOLECALCIFEROL) 2000 units tablet Take 1 tablet by mouth daily     vitamin D3 (VITAMIN D3) 1000 units (25 mcg) tablet Take 1,000 Units by mouth     warfarin (COUMADIN) 2 MG tablet Take 2 tablets (4 mg) by mouth daily     Current Facility-Administered Medications   Medication     medroxyPROGESTERone (DEPO-PROVERA) syringe 150 mg      Vitals:  /86   Pulse 73   Temp 98.1  F (36.7  C) (Oral)   Ht 1.67 m (5' 5.75\")   Wt 119.7 kg (263 lb 12.8 oz)   LMP  (LMP Unknown)   SpO2 100%   BMI 42.90 kg/m      Exam:  GENERAL APPEARANCE: significant periorbital ecchyoses  HENT: mouth without ulcers or lesions  LYMPHATICS: no cervical or supraclavicular nodes  RESP: lungs clear to auscultation - no rales, rhonchi or wheezes  CV: regular rhythm, normal rate, no rub, no murmur  EDEMA: no LE edema bilaterally  ABDOMEN: soft, nondistended, nontender, bowel sounds normal  MS: extremities normal - no gross deformities noted, no evidence of inflammation in joints, no muscle tenderness  SKIN: no rash    LABS:   CMP  Recent Labs   Lab Test 06/04/19  1104 05/30/19  1550 05/30/19  0807 05/28/19  1508    144 145* 145*   POTASSIUM 3.5 3.0* 3.0* 3.1*   CHLORIDE 108 110* 111* 110*   CO2 26 25 25 23   ANIONGAP 8 10 9 12   GLC 95 100* 93 93   BUN 9 38* 42* 65*   CR 1.37* 3.14* 3.38* 6.42*   GFRESTIMATED 52* 19* 18* 8*   GFRESTBLACK 60* 22* 20* 9*   QUINCY 8.3* 7.6* 7.3* 7.2*     Recent Labs   Lab Test 05/28/19  1508 01/24/18  1122   BILITOTAL 0.6 0.5   ALKPHOS 224* 125   ALT 41 18   AST 27 21     CBC  Recent Labs   Lab Test 06/04/19  1104 05/30/19  1550 05/28/19  1508 03/26/19 2048 01/24/18  1122   HGB 9.6* 9.8* 9.0*  --  15.3   WBC 9.2 8.5 6.6  --  5.5   RBC 2.82* 2.80* 2.58*  --  4.55   HCT 30.8* 30.4* 27.1*  --  45.3 "   * 109* 105*  --  100   MCH 34.0* 35.0* 34.9*  --  33.6*   MCHC 31.2* 32.2 33.2  --  33.8   RDW 18.3* 19.2* 19.4*  --  13.9   * 593* 580* 267 269     URINE STUDIES  Recent Labs   Lab Test 05/30/19  1548 02/22/18  1515   COLOR Yellow Yellow   APPEARANCE Slightly Cloudy Slightly Cloudy   URINEGLC Negative Negative   URINEBILI Negative Small*   URINEKETONE Negative Negative   SG 1.012 >1.030   UBLD Small* Moderate*   URINEPH 6.0 5.5   PROTEIN 30* Trace*   UROBILINOGEN  --  1.0   NITRITE Negative Negative   LEUKEST Trace* Negative   RBCU 2 2-5*   WBCU 9* O - 2     Recent Labs   Lab Test 05/30/19  1548   UTPG 0.60*     PTH  Recent Labs   Lab Test 05/31/18  1316 03/28/18  1147 01/24/18  1122   PTHI 82* 147* 188*     IRON STUDIES  No lab results found.      Flori Hernandez MD

## 2019-06-06 ENCOUNTER — TELEPHONE (OUTPATIENT)
Dept: FAMILY MEDICINE | Facility: CLINIC | Age: 29
End: 2019-06-06

## 2019-06-06 NOTE — TELEPHONE ENCOUNTER
Home care forms- I cannot sign since I am not MD .  Will have Dr. Kemal Masters sign when comes into office today

## 2019-06-06 NOTE — TELEPHONE ENCOUNTER
Spoke with patient's mother, Jazmin, by phone.  They were on a Mother's Day trip and had stopped to fill gas in [place], [place] when the patient arrested in the car.  She was taken to  hospitals (see Care Everywhere).  The patient was discharged on 5/26/19 and is now back home.  The patient will follow up with Dr. Jones on 6/11/19 at 9:30 am at the [facility].  Echo prior to appointment.  Patient's mother prefers to follow up in [place], as it is closer to their home.  Dr. Jones will evaluate the patient and determine follow up schedule.  Patient has an appointment scheduled with Dr. Abraham at [place].  This will be adjusted based on Dr. Jones's assessment.  Jazmin states understanding and agrees to call with any further questions or concerns.

## 2019-06-06 NOTE — TELEPHONE ENCOUNTER
[place] forms placed on Dr. Greenfield desk for completion.    Carolann ESTRELLA, Patient Care

## 2019-06-07 ENCOUNTER — PATIENT OUTREACH (OUTPATIENT)
Dept: CARE COORDINATION | Facility: CLINIC | Age: 29
End: 2019-06-07

## 2019-06-07 ENCOUNTER — TELEPHONE (OUTPATIENT)
Dept: FAMILY MEDICINE | Facility: CLINIC | Age: 29
End: 2019-06-07

## 2019-06-07 ENCOUNTER — ANTICOAGULATION THERAPY VISIT (OUTPATIENT)
Dept: NURSING | Facility: CLINIC | Age: 29
End: 2019-06-07

## 2019-06-07 ENCOUNTER — ANCILLARY PROCEDURE (OUTPATIENT)
Dept: ULTRASOUND IMAGING | Facility: CLINIC | Age: 29
End: 2019-06-07
Attending: PHYSICIAN ASSISTANT
Payer: COMMERCIAL

## 2019-06-07 DIAGNOSIS — I26.99 PULMONARY EMBOLISM WITH INFARCTION (H): Primary | ICD-10-CM

## 2019-06-07 DIAGNOSIS — N63.0 BREAST MASS: ICD-10-CM

## 2019-06-07 DIAGNOSIS — N17.9 ACUTE KIDNEY INJURY (H): Primary | ICD-10-CM

## 2019-06-07 LAB — INR PPP: 1.2 (ref 0.9–1.1)

## 2019-06-07 PROCEDURE — 76642 ULTRASOUND BREAST LIMITED: CPT | Mod: 50

## 2019-06-07 NOTE — PROGRESS NOTES
Clinic Care Coordination Contact  Care Team Conversations    RN CC received request from INR/triage RN Kait JAMES Regarding concerns surrounding patient's mental health, as patient has had multiple complex medical concerns resulting in cardiac arrest, hospitalization and is now homebound receiving home care.  Patient's INR this week decreased from 2.0 on 6/3/19 to 1.2 today.  Home care nurse reported to INR clinic that patient's pill box was empty, but she was unable to verify that patient had taken the medication.  Kait JAMES Is concerned regarding patient's mental health and possible withholding of medication.  RN CC advised for Kait JAMES To contact patient and perform a PHQ-9 as well as assess patient's medication compliance.  Kait JAMES Agreed with plan will update assigned RN CC Monica Hernandez.    Melissa Behl BSN, RN, PHN  Primary Care Clinical RN Care Coordinator  [facility]   [phone]

## 2019-06-07 NOTE — TELEPHONE ENCOUNTER
Start lovenox tonight 120 mg BID, stop when INR >2. Have Anticoagulation clinic dose her INR and coumadin dosing please.  RG Raygoza, NP-C  [facility]

## 2019-06-07 NOTE — TELEPHONE ENCOUNTER
Called the patient to assess how her mental state is. Moon had concerns if patient is taking her pills and the amount she is sleeping. Called and mother answered and stated that she was sleeping.    Unable to do a PHQ-9 on the patient. Will forward message to CC to review.    Kait Manriquez RN, [facility] Triage

## 2019-06-07 NOTE — TELEPHONE ENCOUNTER
Called home care with the lovenox dosing also encounter was already started for INR dosing and recheck. Nurse was informed of warfarin dosing for the weekend and recheck on Monday.    Kait Manriquez RN, [facility] Triage

## 2019-06-07 NOTE — TELEPHONE ENCOUNTER
Home care called in a 1.2 INR for the patient today. Provider to address if lovenox is needed. With diagnosis of PE with infarction a BID dosing of 120 mg would be recommended. (reviewed with [facility]). Home care RN has concerns if medication was taken. Pill box empty.    Teed up for provider.    Please double check dosing and medication teed up.    Kait Manriquez RN, [facility]

## 2019-06-07 NOTE — PROGRESS NOTES
ANTICOAGULATION FOLLOW-UP CLINIC VISIT    Patient Name:  Hilaria Bonner  Date:  2019  Contact Type:  Telephone/ [facility] -[phone] and mother    SUBJECTIVE:  Patient Findings     Comments:   Nurse has concerns patient is not taking, medication box was empty  Estimated Creatinine Clearance: 79.8 mL/min (A) (based on SCr of 1.37 mg/dL (H)).          Clinical Outcomes     Negatives:   Major bleeding event, Thromboembolic event, Anticoagulation-related hospital admission, Anticoagulation-related ED visit, Anticoagulation-related fatality    Comments:   Nurse has concerns patient is not taking, medication box was empty  Estimated Creatinine Clearance: 79.8 mL/min (A) (based on SCr of 1.37 mg/dL (H)).             OBJECTIVE    INR   Date Value Ref Range Status   2019 1.2 (A) 0.9 - 1.1 Final       ASSESSMENT / PLAN  INR assessment SUB    Recheck INR In: 3 DAYS    INR Location Homecare INR      Anticoagulation Summary  As of 2019    INR goal:   2.0-3.0   TTR:   --   INR used for dosin.2! (2019)   Warfarin maintenance plan:   No maintenance plan   Full warfarin instructions:   6/7: 4 mg; 6/8: 4 mg; 6/9: 2 mg; Otherwise No maintenance plan   Next INR check:   6/10/2019   Target end date:            Anticoagulation Episode Summary     INR check location:   Anticoagulation Clinic    Preferred lab:       Send INR reminders to:   [facility]    Comments:         Anticoagulation Care Providers     Provider Role Specialty Phone number    Crissy Madrigal PA-C Referring Family Practice [phone]            See the Encounter Report to view Anticoagulation Flowsheet and Dosing Calendar (Go to Encounters tab in chart review, and find the Anticoagulation Therapy Visit)    Dosage adjustment made based on physician directed care plan. Huddle with [facility] for dosing over the weekend and lovenox dosing. Home care and mother informed of the dosing and lovenox injections. Recheck on  Monday.    Moon CAAL states negative for signs and symptoms of bleeding or blood clots, changes in medication, changes in diet, any signs of infection or antibiotic use, anything new OTC or herbal medications, any missed or extra doses of the warfarin.    Kait Manriquez RN

## 2019-06-07 NOTE — TELEPHONE ENCOUNTER
Reason for Call:  INR    Who is calling?  Home Care: Home [facility] Care    Phone number:  [phone]    Name of caller: Moon    INR Value:  1.2    Are there any other concerns:  Yes: would like a call back as soon as possible for dosing instructions    Can we leave a detailed message on this number? YES      Call taken on 6/7/2019 at 1:02 PM by Reno Adrian

## 2019-06-10 ENCOUNTER — PATIENT OUTREACH (OUTPATIENT)
Dept: CARE COORDINATION | Facility: CLINIC | Age: 29
End: 2019-06-10

## 2019-06-10 ENCOUNTER — PRE VISIT (OUTPATIENT)
Dept: ONCOLOGY | Facility: CLINIC | Age: 29
End: 2019-06-10

## 2019-06-10 ENCOUNTER — ONCOLOGY VISIT (OUTPATIENT)
Dept: ONCOLOGY | Facility: CLINIC | Age: 29
End: 2019-06-10
Attending: PHYSICIAN ASSISTANT
Payer: COMMERCIAL

## 2019-06-10 ENCOUNTER — TELEPHONE (OUTPATIENT)
Dept: FAMILY MEDICINE | Facility: CLINIC | Age: 29
End: 2019-06-10

## 2019-06-10 VITALS
OXYGEN SATURATION: 99 % | DIASTOLIC BLOOD PRESSURE: 95 MMHG | HEIGHT: 66 IN | WEIGHT: 259.25 LBS | HEART RATE: 84 BPM | RESPIRATION RATE: 16 BRPM | TEMPERATURE: 98.2 F | BODY MASS INDEX: 41.66 KG/M2 | SYSTOLIC BLOOD PRESSURE: 130 MMHG

## 2019-06-10 DIAGNOSIS — I26.99 PULMONARY EMBOLISM WITH INFARCTION (H): Primary | ICD-10-CM

## 2019-06-10 PROCEDURE — 99204 OFFICE O/P NEW MOD 45 MIN: CPT | Performed by: INTERNAL MEDICINE

## 2019-06-10 ASSESSMENT — ACTIVITIES OF DAILY LIVING (ADL): DEPENDENT_IADLS:: INDEPENDENT

## 2019-06-10 ASSESSMENT — PAIN SCALES - GENERAL: PAINLEVEL: SEVERE PAIN (7)

## 2019-06-10 ASSESSMENT — MIFFLIN-ST. JEOR: SCORE: 1918.7
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
## 2019-06-10 NOTE — NURSING NOTE
"Oncology Rooming Note    Melani 10, 2019 12:37 PM   Hilaria Bonner is a 28 year old female who presents for:    Chief Complaint   Patient presents with     Oncology Clinic Visit     New patient     Initial Vitals: BP (!) 130/95 (BP Location: Right arm)   Pulse 84   Temp 98.2  F (36.8  C) (Oral)   Resp 16   Ht 1.67 m (5' 5.75\")   Wt 117.6 kg (259 lb 4 oz)   LMP  (LMP Unknown)   SpO2 99%   BMI 42.17 kg/m   Estimated body mass index is 42.17 kg/m  as calculated from the following:    Height as of this encounter: 1.67 m (5' 5.75\").    Weight as of this encounter: 117.6 kg (259 lb 4 oz). Body surface area is 2.34 meters squared.  Severe Pain (7) Comment: Data Unavailable   No LMP recorded (lmp unknown).  Allergies reviewed: Yes  Medications reviewed: Yes    Medications: Medication refills not needed today.  Pharmacy name entered into [facility]: [facility] [place] [address] AT [address]  MEDICINE [place]    Yola Alcala LPN            "

## 2019-06-10 NOTE — PROGRESS NOTES
DATE OF VISIT: Rodriguez 10, 2019    REASON FOR REFERRAL:   Acute pulmonary embolism      HISTORY OF PRESENT ILLNESS:     28-year-old female with morbid obesity status post sleeve gastrectomy in March 2019    - 05/13/19-05/26/19 Patient was driving back from [place] to [place] when doing a stop in [facility],collapsed and became unresponsive. She was subsequently brought to the [facility]. Patient develop cardiac arrest and on workup was noted to have right-sided heart strain secondary to massive pulmonary embolism. She was initially in the ICU on multiple vasopressors and eventually was extubated.  Hypercoagulability workup was negative for Anti-cardiolipin antibody, factor V Leiden mutation, prothrombin gene mutation, protein C and protein S deficiency.She was initially started on heparin drip and is eventually was transitioned to p.o. Coumadin prior to discharge. During the Hospitalization, also developed acute renal failure  as well as bloody diarrhea which improved during the hospital course.    Presents to the hematology clinic today for further evaluation. She is accompanied by her mother and 2 children. States that she continues to improve gradually since the hospital discharge. Patient has been following with primary care provider as well as nephrology and cardiology since then. Currently set up with a INR clinic in [place] and gets lab drawn to home infusion. Dyspnea on exertion at baseline. Denies worsening chest pain, hemoptysis, bleeding, axis bruising, lower extremity pain/swelling or any other complaints.    Family history positive for blood clot in her brother Age 24 and he is on lifelong Coumadin.She is unsure if he underwent hypercoagulability testing. Father also has history of blood clot and was anticoagulation for 6 months    REVIEW OF SYSTEMS:      ROS: 14 point ROS neg other than the symptoms noted above in the HPI.    PAST MEDICAL HISTORY:   Past Medical History:    Diagnosis Date     Acute kidney injury (H) 05/13/2019     Cardiac arrest (H) 05/13/2019     Earache or other ear, nose, or throat complaint 12/15    tyroid     Pulmonary embolus (H) 05/13/2019       PAST SURGICAL HISTORY:   Past Surgical History:   Procedure Laterality Date     GYN SURGERY      2 C-sections     KNEE SURGERY  most recently - [id number]    3 surgeries in [place]     LAPAROSCOPIC GASTRIC SLEEVE N/A 3/26/2019    Procedure: Laparoscopic Sleeve Gastrectomy;  Surgeon: Luan Lopez MD;  Location: [facility] OR     ORTHOPEDIC SURGERY      2 knee meniscus surgery       ALLERGIES:   Allergies as of 06/10/2019     (No Known Allergies)       MEDICATIONS:   Current Outpatient Medications   Medication Sig Dispense Refill     acetaminophen (TYLENOL) 325 MG tablet Take 2 tablets (650 mg) by mouth every 4 hours as needed for other (For optimal non-opioid multimodal pain management to improve pain control and physical function.) 100 tablet 0     B Complex Vitamins (VITAMIN-B COMPLEX) TABS Take 1 tablet by mouth       CALCIUM 500/D 500-400 MG-UNIT CHEW        Calcium Citrate-Vitamin D 500-500 MG-UNIT CHEW Take 1 chew tab by mouth 2 times daily 180 tablet 3     CHANTIX STARTING MONTH AMADO 0.5 MG X 11 & 1 MG X 42 tablet        childrens multivitamin w/iron (FLINTSTONES COMPLETE) 60 MG chewable tablet Take 1 chew tab by mouth daily       Cyanocobalamin (B-12) 500 MCG SUBL Place 1 tablet under the tongue daily 90 tablet 3     cyanocobalamin (VITAMIN B-12) 1000 MCG tablet Take 1,000 mcg by mouth       enoxaparin (LOVENOX) 120 MG/0.8ML syringe Inject 0.8 mLs (120 mg) Subcutaneous every 12 hours 8 Syringe 1     ferrous sulfate (FEROSUL) 325 (65 Fe) MG tablet Take 325 mg by mouth       fluticasone (FLONASE) 50 MCG/ACT nasal spray 2 sprays       folic acid (FOLVITE) 1 MG tablet        magic mouthwash (FIRST-MOUTHWASH BLM) compounding kit Take 30 mLs by mouth every 6 hours as needed for mouth sores 900 mL 2     Multiple  Vitamins-Minerals (MULTIVITAMIN ADULTS PO) Take 1 tablet by mouth daily       omeprazole (PRILOSEC) 20 MG DR capsule Take 1 capsule (20 mg) by mouth 2 times daily 180 capsule 1     ondansetron (ZOFRAN-ODT) 4 MG ODT tab Take 1 tablet (4 mg) by mouth every 6 hours as needed for nausea or vomiting 12 tablet 1     senna-docusate (SENOKOT-S/PERICOLACE) 8.6-50 MG tablet Take 2 tablets by mouth 2 times daily 28 tablet 0     topiramate (TOPAMAX) 25 MG tablet Take 3 tablets (75 mg) by mouth daily 90 tablet 3     ursodiol (ACTIGALL) 300 MG capsule Take 1 capsule (300 mg) by mouth 2 times daily 60 capsule 4     vitamin  s/Minerals TABS Take 1 tablet by mouth       vitamin B complex with vitamin C (VITAMIN  B COMPLEX) tablet Take 1 tablet by mouth daily 90 tablet 1     vitamin C (ASCORBIC ACID) 1000 MG TABS Take 1,000 mg by mouth       vitamin D3 (CHOLECALCIFEROL) 2000 units tablet Take 1 tablet by mouth daily 90 tablet 1     vitamin D3 (VITAMIN D3) 1000 units (25 mcg) tablet Take 1,000 Units by mouth       warfarin (COUMADIN) 2 MG tablet Take 2 tablets (4 mg) by mouth daily 60 tablet 0        FAMILY HISTORY:   Family History   Problem Relation Age of Onset     Diabetes Father      Hypertension Father      Breast Cancer Sister 26        surgery only     Cancer Sister      Coronary Artery Disease Other         paternal aunt     Thyroid Disease Other         neice     Coronary Artery Disease Other      Cerebrovascular Disease Other      Breast Cancer Sister      Thyroid Disease Other      Cerebrovascular Disease No family hx of        SOCIAL HISTORY:   Social History     Socioeconomic History     Marital status: Single     Spouse name: Not on file     Number of children: Not on file     Years of education: Not on file     Highest education level: Not on file   Occupational History     Not on file   Social Needs     Financial resource strain: Not on file     Food insecurity:     Worry: Not on file     Inability: Not on file      Transportation needs:     Medical: Not on file     Non-medical: Not on file   Tobacco Use     Smoking status: Former Smoker     Years: 1.00     Start date: 2016     Last attempt to quit: 2018     Years since quittin.4     Smokeless tobacco: Never Used   Substance and Sexual Activity     Alcohol use: Yes     Alcohol/week: 0.0 oz     Comment: occasional     Drug use: No     Sexual activity: Yes     Partners: Male   Lifestyle     Physical activity:     Days per week: Not on file     Minutes per session: Not on file     Stress: Not on file   Relationships     Social connections:     Talks on phone: Not on file     Gets together: Not on file     Attends Tenriism service: Not on file     Active member of club or organization: Not on file     Attends meetings of clubs or organizations: Not on file     Relationship status: Not on file     Intimate partner violence:     Fear of current or ex partner: Not on file     Emotionally abused: Not on file     Physically abused: Not on file     Forced sexual activity: Not on file   Other Topics Concern     Parent/sibling w/ CABG, MI or angioplasty before 65F 55M? Not Asked   Social History Narrative     Not on file       PHYSICAL EXAMINATION:   LMP  (LMP Unknown)   Wt Readings from Last 10 Encounters:   19 118.8 kg (262 lb)   19 119.7 kg (263 lb 12.8 oz)   19 121.6 kg (268 lb)   19 122.9 kg (271 lb)   19 126.5 kg (278 lb 14.4 oz)   19 134.4 kg (296 lb 3.2 oz)   19 134 kg (295 lb 8 oz)   19 136.9 kg (301 lb 14.4 oz)   19 136.1 kg (300 lb)   19 137.4 kg (302 lb 14.4 oz)      Exam:  Constitutional: healthy, alert and no distress  Head: Normocephalic.   Neck: Neck supple.   Cardiovascular: RRR.   Respiratory: Lungs clear  Gastrointestinal: Abdomen soft, non-tender. BS normal. No masses, organomegaly  Musculoskeletal: extremities normal  Skin: no suspicious lesions or rashes  Neurologic: Gait normal. Sensation  grossly WNL.  Psychiatric: mentation appears normal and affect normal/bright  Hematologic/Lymphatic/Immunologic: Normal cervical lymph nodes    LABORATORY RESULTS:    Recent Labs   Lab Test 06/04/19  1104 05/30/19  1550 05/30/19  0807    144 145*   POTASSIUM 3.5 3.0* 3.0*   CHLORIDE 108 110* 111*   BUN 9 38* 42*   CR 1.37* 3.14* 3.38*   GLC 95 100* 93   QUINCY 8.3* 7.6* 7.3*   PHOS  --  3.2 4.0     Recent Labs   Lab Test 06/04/19  1104 05/30/19  1550 05/28/19  1508   WBC 9.2 8.5 6.6   HGB 9.6* 9.8* 9.0*   * 593* 580*   * 109* 105*   NEUTROPHIL 69.2  --  53.5     Recent Labs   Lab Test 05/30/19  1550 05/28/19  1508 01/24/18  1122   BILITOTAL  --  0.6 0.5   ALKPHOS  --  224* 125   ALT  --  41 18   AST  --  27 21   ALBUMIN 2.4* 2.1* 3.3*       Result Factor V Leiden analysis shows no evidence of the Factor V Leiden mutation at codon 506 in either of the two alleles of the Factor V gene.     Interpretation Factor V Leiden mutation does not contribute to overall risk for venous thrombosis in this patient.      Result Prothrombin gene analysis shows no evidence of the [id number] mutation in either of the two alleles of the prothrombin gene.     Interpretation Prothrombin [id number] mutation does not contribute to overall risk for venous thrombosis in this patient.        IMAGING RESULTS:  Interface, Results - 05/13/2019  5:27 PM CDT  Additional relevant history: Cardiac arrest, recent long car ride    Technique:  Helical acquisition through the chest  Contrast: IV  Reconstructions: Multiplanar reformations and 3-D MIPs.    Comparison:  None    Findings:    Pulmonary arteries: Pulmonary arteries are well-opacified to the subsegmental   level. Acute pulmonary emboli are present in segmental and subsegmental   pulmonary arteries bilaterally.    Aorta: Normal.    Heart: The right ventricle is enlarged. The right ventricle to left ventricle   index is greater than 1 (5.9 cm/2.3 cm). No pericardial effusion is  present.    Lymph nodes: Normal.    Pleura: Trace pleural effusions are present bilaterally. Trace anterior right   pneumothorax.    Lungs: Perihilar and lower lobe opacities are probably due to aspiration.    Bones and chest wall: There are minimally displaced fractures in the anterior   right ribs 2-6 and anterior left ribs 2-6. There is a minimal amount of   subcutaneous gas in the anterior medial right chest wall.      Electronically signed by: Andrea Vanegas on 05/13/2019 05:21PM  IMPRESSION  Impression:    Acute segmental and subsegmental pulmonary emboli. CT findings consistent with   right heart strain.    Trace right pneumothorax.    Minimally displaced fractures in the anterior ribs 2-6 bilaterally likely   related to resuscitation.      ASSESSMENT AND PLAN:    28-year-old female with morbid obesity status post sleeve gastrectomy in March 2019 who developed cardiac arrest secondary to acute massive bilateral segmental pulmonary embolism    - Acute bilateral pulmonary embolism with cor pulmonale  She was started on heparin drip and eventually was transitioned to oral Coumadin prior to discharge   Hypercoagulability workup was negative for Anti-cardiolipin antibody, factor V Leiden mutation, prothrombin gene mutation, protein C and protein S deficiency.  Patient was Involved in a long distance car ride for about 7 hours but that alone should not be enough to explain massive clot burden and so clinical impression is that it was likely an unprovoked event. Given that and also strong family history of venous thromboembolism, would recommend continuing anticoagulation indefinitely until last contraindicated. Patient is already established with INR clinic in [place].    Regarding further hypercoagulable workup, would also recommend repeating antiphospholipid antibody testing  as well as checking anti thrombin levels in 3 months which can be done by primary care provider and hematology clinic can be  contacted if results are abnormal    Continue care with PCP  Return to hematology clinic as needed.    The patient is ready to learn, no apparent learning barriers were identified, Diagnosis and treatment plans were explained to the patient. The patient expressed understanding of the content. The patient questions were answered to her satisfaction.    Chart documentation with Dragon Voice recognition Software. Although reviewed after completion, some words and grammatical errors may remain.    Clyde Vigil MD  Attending Physician   Hematology/Medical Oncology

## 2019-06-10 NOTE — PROGRESS NOTES
Clinic Care Coordination Contact  [facility]/Voicemail    Referral Source: PCP    Clinical Data: Care Coordinator Outreach    Outreach attempted to Moon, home care nurse. ([phone])  Left message on voicemail with call back information and requested return call.    Home care had concerns about patient sleeping all the time and questions if she was taking medications appropriately.      Outreach to patient. Phone goes immediately to voicemail. Left message reminding patient of her appointment today with oncology and requested return call to clinic care coordinator.      Will await return calls.      Monica Hernandez RN, [facility] - Primary Care Clinic RN Coordinator  [facility]   6/10/2019    11:15 AM  [phone]

## 2019-06-10 NOTE — PROGRESS NOTES
Clinic Care Coordination Contact      Patient returning clinic care coordinator's call. Patient states she is having difficulty with home care nurses and getting her INR done. She states she attended her hematology appointment at noon. Texted home care nurse that she was back home for INR draw, no response, texted again at 3:30 pm and still not response.     Clinic care coordinator called [facility] and left message with nursing supervisor, Jaqueline, reinforcing that it is very important this patient have timely INR's drawn. She is on both Lovenox and coumadin and INR's have been low.     Patient states she is feeling much better, she feels stronger and is able to be more active.      Home care nurse will get INR drawn.     Clinic care coordinator will continue to follow.     Monica Hernandez RN, [facility] - Primary Care Clinic RN Coordinator  [facility]   6/10/2019    4:16 PM  [phone]

## 2019-06-11 ENCOUNTER — ANCILLARY PROCEDURE (OUTPATIENT)
Dept: CARDIOLOGY | Facility: CLINIC | Age: 29
End: 2019-06-11
Attending: INTERNAL MEDICINE
Payer: COMMERCIAL

## 2019-06-11 ENCOUNTER — TELEPHONE (OUTPATIENT)
Dept: FAMILY MEDICINE | Facility: CLINIC | Age: 29
End: 2019-06-11

## 2019-06-11 ENCOUNTER — ANTICOAGULATION THERAPY VISIT (OUTPATIENT)
Dept: NURSING | Facility: CLINIC | Age: 29
End: 2019-06-11
Payer: COMMERCIAL

## 2019-06-11 VITALS
HEART RATE: 84 BPM | DIASTOLIC BLOOD PRESSURE: 89 MMHG | SYSTOLIC BLOOD PRESSURE: 137 MMHG | BODY MASS INDEX: 41.67 KG/M2 | HEIGHT: 66 IN | OXYGEN SATURATION: 99 % | WEIGHT: 259.3 LBS

## 2019-06-11 DIAGNOSIS — I26.99 PULMONARY EMBOLISM WITH INFARCTION (H): ICD-10-CM

## 2019-06-11 DIAGNOSIS — D50.9 IRON DEFICIENCY ANEMIA, UNSPECIFIED IRON DEFICIENCY ANEMIA TYPE: ICD-10-CM

## 2019-06-11 DIAGNOSIS — N17.9 ACUTE KIDNEY INJURY (H): ICD-10-CM

## 2019-06-11 DIAGNOSIS — I26.99 PULMONARY EMBOLISM WITH INFARCTION (H): Primary | ICD-10-CM

## 2019-06-11 DIAGNOSIS — I47.20 VT (VENTRICULAR TACHYCARDIA) (H): Primary | ICD-10-CM

## 2019-06-11 DIAGNOSIS — I46.9 CARDIAC ARREST (H): ICD-10-CM

## 2019-06-11 DIAGNOSIS — R06.02 SHORTNESS OF BREATH: ICD-10-CM

## 2019-06-11 LAB
ALBUMIN SERPL-MCNC: 2.6 G/DL (ref 3.4–5)
ANION GAP SERPL CALCULATED.3IONS-SCNC: 6 MMOL/L (ref 3–14)
BASOPHILS # BLD AUTO: 0 10E9/L (ref 0–0.2)
BASOPHILS NFR BLD AUTO: 0.4 %
BUN SERPL-MCNC: 5 MG/DL (ref 7–30)
CALCIUM SERPL-MCNC: 8.7 MG/DL (ref 8.5–10.1)
CHLORIDE SERPL-SCNC: 109 MMOL/L (ref 94–109)
CO2 SERPL-SCNC: 25 MMOL/L (ref 20–32)
CREAT SERPL-MCNC: 0.95 MG/DL (ref 0.52–1.04)
CREAT UR-MCNC: 114 MG/DL
DIFFERENTIAL METHOD BLD: ABNORMAL
EOSINOPHIL # BLD AUTO: 0.1 10E9/L (ref 0–0.7)
EOSINOPHIL NFR BLD AUTO: 0.7 %
ERYTHROCYTE [DISTWIDTH] IN BLOOD BY AUTOMATED COUNT: 17 % (ref 10–15)
FERRITIN SERPL-MCNC: 359 NG/ML (ref 12–150)
GFR SERPL CREATININE-BSD FRML MDRD: 81 ML/MIN/{1.73_M2}
GLUCOSE SERPL-MCNC: 83 MG/DL (ref 70–99)
HCT VFR BLD AUTO: 30.7 % (ref 35–47)
HGB BLD-MCNC: 9.7 G/DL (ref 11.7–15.7)
IMM GRANULOCYTES # BLD: 0 10E9/L (ref 0–0.4)
IMM GRANULOCYTES NFR BLD: 0.3 %
INR PPP: 1.18 (ref 0.86–1.14)
IRON SATN MFR SERPL: 40 % (ref 15–46)
IRON SERPL-MCNC: 66 UG/DL (ref 35–180)
LYMPHOCYTES # BLD AUTO: 1.8 10E9/L (ref 0.8–5.3)
LYMPHOCYTES NFR BLD AUTO: 24.1 %
MCH RBC QN AUTO: 34 PG (ref 26.5–33)
MCHC RBC AUTO-ENTMCNC: 31.6 G/DL (ref 31.5–36.5)
MCV RBC AUTO: 108 FL (ref 78–100)
MONOCYTES # BLD AUTO: 0.6 10E9/L (ref 0–1.3)
MONOCYTES NFR BLD AUTO: 7.7 %
NEUTROPHILS # BLD AUTO: 4.9 10E9/L (ref 1.6–8.3)
NEUTROPHILS NFR BLD AUTO: 66.8 %
NRBC # BLD AUTO: 0 10*3/UL
NRBC BLD AUTO-RTO: 0 /100
PHOSPHATE SERPL-MCNC: 2.3 MG/DL (ref 2.5–4.5)
PLATELET # BLD AUTO: 336 10E9/L (ref 150–450)
POTASSIUM SERPL-SCNC: 3.3 MMOL/L (ref 3.4–5.3)
PROT UR-MCNC: 0.46 G/L
PROT/CREAT 24H UR: 0.4 G/G CR (ref 0–0.2)
RBC # BLD AUTO: 2.85 10E12/L (ref 3.8–5.2)
SODIUM SERPL-SCNC: 139 MMOL/L (ref 133–144)
TIBC SERPL-MCNC: 166 UG/DL (ref 240–430)
WBC # BLD AUTO: 7.4 10E9/L (ref 4–11)

## 2019-06-11 PROCEDURE — 85025 COMPLETE CBC W/AUTO DIFF WBC: CPT | Performed by: INTERNAL MEDICINE

## 2019-06-11 PROCEDURE — 84156 ASSAY OF PROTEIN URINE: CPT

## 2019-06-11 PROCEDURE — 82728 ASSAY OF FERRITIN: CPT | Performed by: INTERNAL MEDICINE

## 2019-06-11 PROCEDURE — 99205 OFFICE O/P NEW HI 60 MIN: CPT | Mod: GC | Performed by: INTERNAL MEDICINE

## 2019-06-11 PROCEDURE — 83550 IRON BINDING TEST: CPT | Performed by: INTERNAL MEDICINE

## 2019-06-11 PROCEDURE — 80069 RENAL FUNCTION PANEL: CPT | Performed by: INTERNAL MEDICINE

## 2019-06-11 PROCEDURE — 85610 PROTHROMBIN TIME: CPT | Performed by: INTERNAL MEDICINE

## 2019-06-11 PROCEDURE — 83540 ASSAY OF IRON: CPT | Performed by: INTERNAL MEDICINE

## 2019-06-11 PROCEDURE — G0463 HOSPITAL OUTPT CLINIC VISIT: HCPCS | Mod: 25,ZF

## 2019-06-11 PROCEDURE — 93010 ELECTROCARDIOGRAM REPORT: CPT | Mod: ZP | Performed by: INTERNAL MEDICINE

## 2019-06-11 PROCEDURE — 36415 COLL VENOUS BLD VENIPUNCTURE: CPT | Performed by: INTERNAL MEDICINE

## 2019-06-11 PROCEDURE — 93005 ELECTROCARDIOGRAM TRACING: CPT

## 2019-06-11 RX ADMIN — Medication 6 ML: at 09:00

## 2019-06-11 ASSESSMENT — PAIN SCALES - GENERAL: PAINLEVEL: SEVERE PAIN (7)

## 2019-06-11 ASSESSMENT — MIFFLIN-ST. JEOR: SCORE: 1922.93

## 2019-06-11 NOTE — TELEPHONE ENCOUNTER
Routing to cardiology as patient's orders came from them for INR. Patient's INR continues to be sub therapeutic and there is some questions as to wether patient is taking warfarin or not. Please advise.        Ayana Tay RN, BSN, PHN

## 2019-06-11 NOTE — PROGRESS NOTES
ANTICOAGULATION FOLLOW-UP CLINIC VISIT    Patient Name:  Hilaria Bonner   Date:  2019  Contact Type:  Telephone    SUBJECTIVE:         OBJECTIVE    INR   Date Value Ref Range Status   2019 1.18 (H) 0.86 - 1.14 Final       ASSESSMENT / PLAN  INR assessment SUB    Recheck INR In: 3 DAYS    INR Location Clinic Lab     Anticoagulation Summary  As of 2019    INR goal:   2.0-3.0   TTR:   0.0 % (2 d)   INR used for dosin.18! (2019)   Warfarin maintenance plan:   No maintenance plan   Full warfarin instructions:   : 4 mg; : 4 mg; Otherwise No maintenance plan   Next INR check:   2019   Target end date:            Anticoagulation Episode Summary     INR check location:   Anticoagulation Clinic    Preferred lab:       Send INR reminders to:   [facility]    Comments:         Anticoagulation Care Providers     Provider Role Specialty Phone number    Crissy Madrigal PA-C Referring [facility] [phone]            See the Encounter Report to view Anticoagulation Flowsheet and Dosing Calendar (Go to Encounters tab in chart review, and find the Anticoagulation Therapy Visit)    Dosage adjustment made based on physician directed care plan. Reviewed dosing and bridging with patient and she was only doing the Lovenox injections once daily instead of twice daily. Will have patient take 4 mg daily and do her injections twice daily instead of once daily with recheck in 2 days as patient will be in clinic for recheck that day; orders placed for lab draw.       Ayana Tay RN, BSN, PHN

## 2019-06-11 NOTE — LETTER
6/11/2019      RE: Hilaria Bonner  [address]  [address]  [facility] [address]       Dear Colleague,    Thank you for the opportunity to participate in the care of your patient, Hilaria Bonner, at the [facility] at [facility]. Please see a copy of my visit note below.         Vascular Cardiology Consultation    Referring Provider: Referred Self   Primary Care Provider: Clinic, [facility]     Patient Name: Hilaria Bonner   MRN: [id number]     PERTINENT CLINICAL HISTORY:   Hilaria Bonner is a 28 year old female with recent hx of massive PE with PEA arrest who is presenting for evaluation regarding ongoing management for PE.     Briefly in 05/2019 patient had a hospital cardiac arrest while at the ED, requiring > 50min of CPR with intermittent ROSC. At the time a bedside TTE showed RV dilation for which she received empiric full dose tPA and was subsequently transferred to [facility] for ongoing management. Hospital course was complicated ARAMIS (w/o need of RRT), ischemic colitis and acute blood los requiring blood transfusions. She was discharged on 05/14/2019. Prior to the event patient was driven from [place] to [place] with her mother. At a gas stop in [place] patient felt pain in her legs and few seconds later she collapsed. At the time of EMS arrival patient conscious and answering questions. En route to the hospital patient became more hypotensive and was transferred to the nearest hospital, at which point she arrested.     Today, Ms. Bonner reports steady improvement in her functional capacity. She still reports generalized weakness and dyspnea with exertion when walking to long. Her biggest other at this time is chest pain associated with movement of torso, no assiciated with ambulation. She also reports headaches, nightmares and insomnia. Otherwise she currently denies any exertional chest pain, dyspnea at rest, leg swelling, orthopnea, PND  muscles aches, joint aches, fevers, chills, new rashes or skin discoloration. She is still ambulating with a walker but feels symptoms are improving. Tolerating medications ok.       PAST MEDICAL HISTORY:     Past Medical History:   Diagnosis Date     Acute kidney injury (H) 05/13/2019     Cardiac arrest (H) 05/13/2019     Earache or other ear, nose, or throat complaint 12/15    tyroid     Pulmonary embolus (H) 05/13/2019        PAST SURGICAL HISTORY:     Past Surgical History:   Procedure Laterality Date     GYN SURGERY      2 C-sections     KNEE SURGERY  most recently - [id number]    3 surgeries in [place]     LAPAROSCOPIC GASTRIC SLEEVE N/A 3/26/2019    Procedure: Laparoscopic Sleeve Gastrectomy;  Surgeon: Luan Lopez MD;  Location: [facility] OR     ORTHOPEDIC SURGERY      2 knee meniscus surgery        CURRENT MEDICATIONS:     Current Outpatient Medications   Medication Sig Dispense Refill     acetaminophen (TYLENOL) 325 MG tablet Take 2 tablets (650 mg) by mouth every 4 hours as needed for other (For optimal non-opioid multimodal pain management to improve pain control and physical function.) 100 tablet 0     B Complex Vitamins (VITAMIN-B COMPLEX) TABS Take 1 tablet by mouth       CALCIUM 500/D 500-400 MG-UNIT CHEW        Calcium Citrate-Vitamin D 500-500 MG-UNIT CHEW Take 1 chew tab by mouth 2 times daily 180 tablet 3     CHANTIX STARTING MONTH AMADO 0.5 MG X 11 & 1 MG X 42 tablet        childrens multivitamin w/iron (FLINTSTONES COMPLETE) 60 MG chewable tablet Take 1 chew tab by mouth daily       Cyanocobalamin (B-12) 500 MCG SUBL Place 1 tablet under the tongue daily 90 tablet 3     cyanocobalamin (VITAMIN B-12) 1000 MCG tablet Take 1,000 mcg by mouth       enoxaparin (LOVENOX) 120 MG/0.8ML syringe Inject 0.8 mLs (120 mg) Subcutaneous every 12 hours 8 Syringe 1     ferrous sulfate (FEROSUL) 325 (65 Fe) MG tablet Take 325 mg by mouth       fluticasone (FLONASE) 50 MCG/ACT nasal spray 2 sprays       folic acid  (FOLVITE) 1 MG tablet        magic mouthwash (FIRST-MOUTHWASH BLM) compounding kit Take 30 mLs by mouth every 6 hours as needed for mouth sores 900 mL 2     Multiple Vitamins-Minerals (MULTIVITAMIN ADULTS PO) Take 1 tablet by mouth daily       omeprazole (PRILOSEC) 20 MG DR capsule Take 1 capsule (20 mg) by mouth 2 times daily 180 capsule 1     ondansetron (ZOFRAN-ODT) 4 MG ODT tab Take 1 tablet (4 mg) by mouth every 6 hours as needed for nausea or vomiting 12 tablet 1     oxyCODONE-acetaminophen (PERCOCET) 5-325 MG tablet Take 1 tablet by mouth every 6 hours as needed for moderate pain or severe pain 30 tablet 0     senna-docusate (SENOKOT-S/PERICOLACE) 8.6-50 MG tablet Take 2 tablets by mouth 2 times daily 28 tablet 0     topiramate (TOPAMAX) 25 MG tablet Take 3 tablets (75 mg) by mouth daily 90 tablet 3     ursodiol (ACTIGALL) 300 MG capsule Take 1 capsule (300 mg) by mouth 2 times daily 60 capsule 4     vitamin  s/Minerals TABS Take 1 tablet by mouth       vitamin B complex with vitamin C (VITAMIN  B COMPLEX) tablet Take 1 tablet by mouth daily 90 tablet 1     vitamin C (ASCORBIC ACID) 1000 MG TABS Take 1,000 mg by mouth       vitamin D3 (CHOLECALCIFEROL) 2000 units tablet Take 1 tablet by mouth daily 90 tablet 1     vitamin D3 (VITAMIN D3) 1000 units (25 mcg) tablet Take 1,000 Units by mouth       warfarin (COUMADIN) 2 MG tablet Take 2 tablets (4 mg) by mouth daily 60 tablet 0        ALLERGIES:   No Known Allergies     FAMILY HISTORY:     Family History   Problem Relation Age of Onset     Diabetes Father      Hypertension Father      Breast Cancer Sister 26        surgery only     Cancer Sister      Coronary Artery Disease Other         paternal aunt     Thyroid Disease Other         neice     Coronary Artery Disease Other      Cerebrovascular Disease Other      Breast Cancer Sister      Thyroid Disease Other      Cerebrovascular Disease No family hx of         SOCIAL HISTORY:     Social History  "    Socioeconomic History     Marital status: Single     Spouse name: None     Number of children: None     Years of education: None     Highest education level: None   Occupational History     None   Social Needs     Financial resource strain: None     Food insecurity:     Worry: None     Inability: None     Transportation needs:     Medical: None     Non-medical: None   Tobacco Use     Smoking status: Former Smoker     Years: 1.00     Start date: 2016     Last attempt to quit: 2018     Years since quittin.4     Smokeless tobacco: Never Used   Substance and Sexual Activity     Alcohol use: Yes     Alcohol/week: 0.0 oz     Comment: occasional     Drug use: No     Sexual activity: Yes     Partners: Male   Lifestyle     Physical activity:     Days per week: None     Minutes per session: None     Stress: None   Relationships     Social connections:     Talks on phone: None     Gets together: None     Attends [employer] service: None     Active member of club or organization: None     Attends meetings of clubs or organizations: None     Relationship status: None     Intimate partner violence:     Fear of current or ex partner: None     Emotionally abused: None     Physically abused: None     Forced sexual activity: None   Other Topics Concern     Parent/sibling w/ CABG, MI or angioplasty before 65F 55M? Not Asked   Social History Narrative     None     Hilaria  reports that she drinks alcohol. and  reports that she quit smoking about 16 months ago. She started smoking about 2 years ago. She quit after 1.00 year of use. She has never used smokeless tobacco..     REVIEW OF SYSTEMS:   A comprehensive review of systems was performed and negative unless otherwise noted in the HPI above.      PHYSICAL EXAMINATION:   /89 (BP Location: Right arm, Patient Position: Chair, Cuff Size: Adult Large)   Pulse 84   Ht 1.676 m (5' 6\")   Wt 117.6 kg (259 lb 4.8 oz)   LMP  (LMP Unknown)   SpO2 99%   BMI 41.85 " kg/m     Body mass index is 41.85 kg/m .  Wt Readings from Last 2 Encounters:   06/11/19 117.6 kg (259 lb 4.8 oz)   06/10/19 117.6 kg (259 lb 4 oz)     Constitutional: no acute distress, pleasant and cooperative, appears overall well.  Eyes:sclera white, conjunctiva clear, without icterus or pallor. Bruises noted underneath each eye   Ears/Nose/Mouth/Throat: Pinna, tragus, and external canal non-tender are normal, Nares patent b/l, moist mucous membranes. Echymosis with bump in the forehead in between eyes related to fall during collapse  Cardiovascular: RRR nl S1S2, JVP not elevated, mild edema R>L  Vasc: +2 radial, DP, PT pulses b/l   Respiratory: clear to auscultation and percussion bilaterally anterior and posterior  Gastrointestinal: soft, nontender, non distended, no hepatosplenomegaly or masses  Musculoskeletal: normal muscle bulk and tone, joints   Skin: normal skin appearance without worrisome lesions.   Neurologic: Alert and oriented, face symmetric, normal gait  Psychiatric: appropriate affect, eye contact, intact thought and speech       LABORATORY DATA:     LIPID RESULTS:  Recent Labs   Lab Test 01/12/18  0938 12/06/16  1121   CHOL 198 232*   HDL 71 70   LDL 90 142*   TRIG 183* 98        LIVER ENZYME RESULTS:  Recent Labs   Lab Test 05/28/19  1508 01/24/18  1122   AST 27 21   ALT 41 18       CBC RESULTS:  Recent Labs   Lab Test 06/04/19  1104 05/30/19  1550   WBC 9.2 8.5   HGB 9.6* 9.8*   HCT 30.8* 30.4*   * 593*       BMP RESULTS:  Recent Labs   Lab Test 06/04/19  1104 05/30/19  1550    144   POTASSIUM 3.5 3.0*   CHLORIDE 108 110*   CO2 26 25   ANIONGAP 8 10   GLC 95 100*   BUN 9 38*   CR 1.37* 3.14*   QUINCY 8.3* 7.6*       A1C RESULTS:  Lab Results   Component Value Date    A1C 5.3 01/12/2018       INR RESULTS:  Recent Labs   Lab Test 06/07/19 06/03/19   INR 1.2* 2.0*      PROCEDURES & FURTHER ASSESSMENTS:     EKG: Sinus rhythm at 74bpm, non specific TW changes    ECHO:    05/14/2019    SUMMARY:   1. Normal global left ventricular systolic function.   2. Left ventricular ejection fraction, by visual estimation, is 70%.   3. Right ventricular size is severely enlarged. Right ventricular global systolic function is severely reduced.   4. Left ventricular diastolic function cannot be asessed due to E and A fusion caused by tachycardia.   5. Normal left ventricular chamber size.  In comparison to the previous echocardiogram(s):  There are no prior studies on this patient for comparison purposes.       CLINICAL IMPRESSION:     Hilaria Bonner is a 28 year old female with recent hx of massive PE with PEA arrest who is presenting for establishment of care and chronic PE management. Patient still has some residual dyspnea which could be related to residual clot burden vs deconditioning vs persistent anemia. In regards to possible underlining cause of PE, Ms. Bonner has multiple risk factors (smoking, obesity, hormonal birth control and family hx), given the extent of her embolism and  possible familial coagulopathy we would recommend life long anticoagulation at this time. Finally although Ms. Bonner is making steady physical recovery I am concern that she may be developing some PTSD due to her arrest, we recommended neuropsych evaluation but she would like to wait for now.    PLAN:  -- Lower extremity US dyplex  -- Labs today  -- Continue warfarin indefinitely, goal INR 2-3  -- We encouraged continue smoking cessation  -- Recommend lowest risk birth control (consider copper IUD)   -- Neuropsych once patient is ready  -- Repeat CTPE in 1mo  -- 6MWT prior to next visit    Follow-up: 3mo    Thank you for allowing us to take part in the care of this very pleasant patient.  Please do not hesitate to call if any further questions or concerns arise.    Patient seen and discussed with Dr. Jones.     Keaton Castle MD  Cardiology Fellow    June 11, 2019    ATTENDING ATTESTATION    Patient was seen  and evaluated in clinic today with the cardiology fellow. The note has been edited to reflect the history taking performed by me, my personal review of imaging, EKGs, labs and procedures, and our joint assessment and plan.     Total time spent 60 minutes, of which >50% was spent in face-to-face patient evaluation, reviewing data with patient, prolonged counseling of lifestyle modifications, any necessary genetic testing and screening of family members, and coordination of care.     Yelena Jones MD MSc    Division of Cardiology  Vascular Section  [facility]  Patient Care Team:  [facility], [facility] as PCP - General  Susan Stephens RD as Registered Dietitian (Dietitian, Registered)  Joanne Sterling PA-C as Physician Assistant (Physician Assistant)  Terri Cody PA-C as Physician Assistant (Physician Assistant)  Terri Cody PA-C as Assigned PCP  Monica Hernandez RN as Clinic Care Coordinator (Primary Care - CC)  [facility]  SELF, REFERRED

## 2019-06-11 NOTE — NURSING NOTE
Cardiac/Vascular Testing: Patient given instructions regarding lower extremity arterial duplex today.  CTPE in 1 month.  6 minute walk test prior to follow up appointment. Discussed purpose, preparation, procedure and when to expect results reported back to the patient. Patient demonstrated understanding of this information and agreed to call with further questions or concerns.  Diet: Patient instructed regarding a heart healthy diet, including discussion of reduced fat and sodium intake. Patient demonstrated understanding of this information and agreed to call with further questions or concerns.  Labs:  Patient was instructed to have laboratory testing prior to leaving today. Patient demonstrated understanding of this information and agreed to call with further questions or concerns.   Med Reconcile: Reviewed and verified all current medications with the patient. The updated medication list was printed and given to the patient.  Return Appointment: 3 months with Dr. Jones. Patient given instructions regarding scheduling next clinic visit. Patient demonstrated understanding of this information and agreed to call with further questions or concerns.  Patient stated she understood all health information given and agreed to call with further questions or concerns.

## 2019-06-11 NOTE — PROGRESS NOTES
Vascular Cardiology Consultation    Referring Provider: Referred Self   Primary Care Provider: [facility]     Patient Name: Hilaria Bonner   MRN: [id number]     PERTINENT CLINICAL HISTORY:   Hilaria Bonner is a 28 year old female with recent hx of massive PE with PEA arrest who is presenting for evaluation regarding ongoing management for PE.     Briefly in 05/2019 patient had a hospital cardiac arrest while at the ED, requiring > 50min of CPR with intermittent ROSC. At the time a bedside TTE showed RV dilation for which she received empiric full dose tPA and was subsequently transferred to [facility] for ongoing management. Hospital course was complicated ARAMIS (w/o need of RRT), ischemic colitis and acute blood los requiring blood transfusions. She was discharged on 05/14/2019. Prior to the event patient was driven from [place] to [place] with her mother. At a gas stop in [place] patient felt pain in her legs and few seconds later she collapsed. At the time of EMS arrival patient conscious and answering questions. En route to the hospital patient became more hypotensive and was transferred to the nearest hospital, at which point she arrested.     Today, Ms. Bonner reports steady improvement in her functional capacity. She still reports generalized weakness and dyspnea with exertion when walking to long. Her biggest other at this time is chest pain associated with movement of torso, no assiciated with ambulation. She also reports headaches, nightmares and insomnia. Otherwise she currently denies any exertional chest pain, dyspnea at rest, leg swelling, orthopnea, PND muscles aches, joint aches, fevers, chills, new rashes or skin discoloration. She is still ambulating with a walker but feels symptoms are improving. Tolerating medications ok.       PAST MEDICAL HISTORY:     Past Medical History:   Diagnosis Date     Acute kidney injury (H) 05/13/2019     Cardiac arrest (H) 05/13/2019      Earache or other ear, nose, or throat complaint 12/15    tyroid     Pulmonary embolus (H) 05/13/2019        PAST SURGICAL HISTORY:     Past Surgical History:   Procedure Laterality Date     GYN SURGERY      2 C-sections     KNEE SURGERY  most recently - [id number]    3 surgeries in [place]     LAPAROSCOPIC GASTRIC SLEEVE N/A 3/26/2019    Procedure: Laparoscopic Sleeve Gastrectomy;  Surgeon: Luan Lopez MD;  Location: [facility] OR     ORTHOPEDIC SURGERY      2 knee meniscus surgery        CURRENT MEDICATIONS:     Current Outpatient Medications   Medication Sig Dispense Refill     acetaminophen (TYLENOL) 325 MG tablet Take 2 tablets (650 mg) by mouth every 4 hours as needed for other (For optimal non-opioid multimodal pain management to improve pain control and physical function.) 100 tablet 0     B Complex Vitamins (VITAMIN-B COMPLEX) TABS Take 1 tablet by mouth       CALCIUM 500/D 500-400 MG-UNIT CHEW        Calcium Citrate-Vitamin D 500-500 MG-UNIT CHEW Take 1 chew tab by mouth 2 times daily 180 tablet 3     CHANTIX STARTING MONTH AMADO 0.5 MG X 11 & 1 MG X 42 tablet        childrens multivitamin w/iron (FLINTSTONES COMPLETE) 60 MG chewable tablet Take 1 chew tab by mouth daily       Cyanocobalamin (B-12) 500 MCG SUBL Place 1 tablet under the tongue daily 90 tablet 3     cyanocobalamin (VITAMIN B-12) 1000 MCG tablet Take 1,000 mcg by mouth       enoxaparin (LOVENOX) 120 MG/0.8ML syringe Inject 0.8 mLs (120 mg) Subcutaneous every 12 hours 8 Syringe 1     ferrous sulfate (FEROSUL) 325 (65 Fe) MG tablet Take 325 mg by mouth       fluticasone (FLONASE) 50 MCG/ACT nasal spray 2 sprays       folic acid (FOLVITE) 1 MG tablet        magic mouthwash (FIRST-MOUTHWASH BLM) compounding kit Take 30 mLs by mouth every 6 hours as needed for mouth sores 900 mL 2     Multiple Vitamins-Minerals (MULTIVITAMIN ADULTS PO) Take 1 tablet by mouth daily       omeprazole (PRILOSEC) 20 MG DR capsule Take 1 capsule (20 mg) by mouth 2 times  daily 180 capsule 1     ondansetron (ZOFRAN-ODT) 4 MG ODT tab Take 1 tablet (4 mg) by mouth every 6 hours as needed for nausea or vomiting 12 tablet 1     oxyCODONE-acetaminophen (PERCOCET) 5-325 MG tablet Take 1 tablet by mouth every 6 hours as needed for moderate pain or severe pain 30 tablet 0     senna-docusate (SENOKOT-S/PERICOLACE) 8.6-50 MG tablet Take 2 tablets by mouth 2 times daily 28 tablet 0     topiramate (TOPAMAX) 25 MG tablet Take 3 tablets (75 mg) by mouth daily 90 tablet 3     ursodiol (ACTIGALL) 300 MG capsule Take 1 capsule (300 mg) by mouth 2 times daily 60 capsule 4     vitamin  s/Minerals TABS Take 1 tablet by mouth       vitamin B complex with vitamin C (VITAMIN  B COMPLEX) tablet Take 1 tablet by mouth daily 90 tablet 1     vitamin C (ASCORBIC ACID) 1000 MG TABS Take 1,000 mg by mouth       vitamin D3 (CHOLECALCIFEROL) 2000 units tablet Take 1 tablet by mouth daily 90 tablet 1     vitamin D3 (VITAMIN D3) 1000 units (25 mcg) tablet Take 1,000 Units by mouth       warfarin (COUMADIN) 2 MG tablet Take 2 tablets (4 mg) by mouth daily 60 tablet 0        ALLERGIES:   No Known Allergies     FAMILY HISTORY:     Family History   Problem Relation Age of Onset     Diabetes Father      Hypertension Father      Breast Cancer Sister 26        surgery only     Cancer Sister      Coronary Artery Disease Other         paternal aunt     Thyroid Disease Other         neice     Coronary Artery Disease Other      Cerebrovascular Disease Other      Breast Cancer Sister      Thyroid Disease Other      Cerebrovascular Disease No family hx of         SOCIAL HISTORY:     Social History     Socioeconomic History     Marital status: Single     Spouse name: None     Number of children: None     Years of education: None     Highest education level: None   Occupational History     None   Social Needs     Financial resource strain: None     Food insecurity:     Worry: None     Inability: None     Transportation needs:      "Medical: None     Non-medical: None   Tobacco Use     Smoking status: Former Smoker     Years: 1.00     Start date: 2016     Last attempt to quit: 2018     Years since quittin.4     Smokeless tobacco: Never Used   Substance and Sexual Activity     Alcohol use: Yes     Alcohol/week: 0.0 oz     Comment: occasional     Drug use: No     Sexual activity: Yes     Partners: Male   Lifestyle     Physical activity:     Days per week: None     Minutes per session: None     Stress: None   Relationships     Social connections:     Talks on phone: None     Gets together: None     Attends Religion service: None     Active member of club or organization: None     Attends meetings of clubs or organizations: None     Relationship status: None     Intimate partner violence:     Fear of current or ex partner: None     Emotionally abused: None     Physically abused: None     Forced sexual activity: None   Other Topics Concern     Parent/sibling w/ CABG, MI or angioplasty before 65F 55M? Not Asked   Social History Narrative     None     Hilaria  reports that she drinks alcohol. and  reports that she quit smoking about 16 months ago. She started smoking about 2 years ago. She quit after 1.00 year of use. She has never used smokeless tobacco..     REVIEW OF SYSTEMS:   A comprehensive review of systems was performed and negative unless otherwise noted in the HPI above.      PHYSICAL EXAMINATION:   /89 (BP Location: Right arm, Patient Position: Chair, Cuff Size: Adult Large)   Pulse 84   Ht 1.676 m (5' 6\")   Wt 117.6 kg (259 lb 4.8 oz)   LMP  (LMP Unknown)   SpO2 99%   BMI 41.85 kg/m    Body mass index is 41.85 kg/m .  Wt Readings from Last 2 Encounters:   19 117.6 kg (259 lb 4.8 oz)   06/10/19 117.6 kg (259 lb 4 oz)     Constitutional: no acute distress, pleasant and cooperative, appears overall well.  Eyes:sclera white, conjunctiva clear, without icterus or pallor. Bruises noted underneath each eye "   Ears/Nose/Mouth/Throat: Pinna, tragus, and external canal non-tender are normal, Nares patent b/l, moist mucous membranes. Echymosis with bump in the forehead in between eyes related to fall during collapse  Cardiovascular: RRR nl S1S2, JVP not elevated, mild edema R>L  Vasc: +2 radial, DP, PT pulses b/l   Respiratory: clear to auscultation and percussion bilaterally anterior and posterior  Gastrointestinal: soft, nontender, non distended, no hepatosplenomegaly or masses  Musculoskeletal: normal muscle bulk and tone, joints   Skin: normal skin appearance without worrisome lesions.   Neurologic: Alert and oriented, face symmetric, normal gait  Psychiatric: appropriate affect, eye contact, intact thought and speech       LABORATORY DATA:     LIPID RESULTS:  Recent Labs   Lab Test 01/12/18  0938 12/06/16  1121   CHOL 198 232*   HDL 71 70   LDL 90 142*   TRIG 183* 98        LIVER ENZYME RESULTS:  Recent Labs   Lab Test 05/28/19  1508 01/24/18  1122   AST 27 21   ALT 41 18       CBC RESULTS:  Recent Labs   Lab Test 06/04/19  1104 05/30/19  1550   WBC 9.2 8.5   HGB 9.6* 9.8*   HCT 30.8* 30.4*   * 593*       BMP RESULTS:  Recent Labs   Lab Test 06/04/19  1104 05/30/19  1550    144   POTASSIUM 3.5 3.0*   CHLORIDE 108 110*   CO2 26 25   ANIONGAP 8 10   GLC 95 100*   BUN 9 38*   CR 1.37* 3.14*   QUINCY 8.3* 7.6*       A1C RESULTS:  Lab Results   Component Value Date    A1C 5.3 01/12/2018       INR RESULTS:  Recent Labs   Lab Test 06/07/19 06/03/19   INR 1.2* 2.0*          PROCEDURES & FURTHER ASSESSMENTS:     EKG: Sinus rhythm at 74bpm, non specific TW changes    ECHO:   05/14/2019    SUMMARY:   1. Normal global left ventricular systolic function.   2. Left ventricular ejection fraction, by visual estimation, is 70%.   3. Right ventricular size is severely enlarged. Right ventricular global systolic function is severely reduced.   4. Left ventricular diastolic function cannot be asessed due to E and A fusion  caused by tachycardia.   5. Normal left ventricular chamber size.  In comparison to the previous echocardiogram(s):  There are no prior studies on this patient for comparison purposes.       CLINICAL IMPRESSION:     Hilaria Bonner is a 28 year old female with recent hx of massive PE with PEA arrest who is presenting for establishment of care and chronic PE management. Patient still has some residual dyspnea which could be related to residual clot burden vs deconditioning vs persistent anemia. In regards to possible underlining cause of PE, Ms. Bonner has multiple risk factors (smoking, obesity, hormonal birth control and family hx), given the extent of her embolism and  possible familial coagulopathy we would recommend life long anticoagulation at this time. Finally although Ms. Bonner is making steady physical recovery I am concern that she may be developing some PTSD due to her arrest, we recommended neuropsych evaluation but she would like to wait for now.    PLAN:  -- Lower extremity US dyplex  -- Labs today  -- Continue warfarin indefinitely, goal INR 2-3  -- We encouraged continue smoking cessation  -- Recommend lowest risk birth control (consider copper IUD)   -- Neuropsych once patient is ready  -- Repeat CTPE in 1mo  -- 6MWT prior to next visit    Follow-up: 3mo    Thank you for allowing us to take part in the care of this very pleasant patient.  Please do not hesitate to call if any further questions or concerns arise.    Patient seen and discussed with Dr. Jones.     Keaton Castle MD  Cardiology Fellow    June 11, 2019    ATTENDING ATTESTATION    Patient was seen and evaluated in clinic today with the cardiology fellow. The note has been edited to reflect the history taking performed by me, my personal review of imaging, EKGs, labs and procedures, and our joint assessment and plan.     Total time spent 60 minutes, of which >50% was spent in face-to-face patient evaluation, reviewing data with patient,  prolonged counseling of lifestyle modifications, any necessary genetic testing and screening of family members, and coordination of care.       Yelena Jones MD MSc    Division of Cardiology  Vascular Section  [facility]          CC  Patient Care Team:  Clinic, [facility] as PCP - Susan Edwards RD as Registered Dietitian (Dietitian, Registered)  Joanne Sterling PA-C as Physician Assistant (Physician Assistant)  Terri Cody PA-C as Physician Assistant (Physician Assistant)  Terri Cody PA-C as Assigned PCP  Monica Hernandez, RN as Clinic Care Coordinator (Primary Care - CC)  [employer] [facility]  SELF, REFERRED

## 2019-06-11 NOTE — TELEPHONE ENCOUNTER
Reason for Call:  Other     Detailed comments: Patient had labs done today and asking what the results of INR is?    Phone Number Patient can be reached at: Cell number on file:    Telephone Information:   Mobile [phone]       Best Time: any    Can we leave a detailed message on this number? YES    Call taken on 6/11/2019 at 3:55 PM by Carina Noel

## 2019-06-11 NOTE — TELEPHONE ENCOUNTER
Please see the June 11, 2019 Anticoagulation encounter for further information.      Ayana Tay RN, BSN, PHN, [facility]

## 2019-06-11 NOTE — PATIENT INSTRUCTIONS
You were seen today in the Cardiovascular Clinic at the [facility].      Cardiology Providers you saw during your visit:   Dr. Jones    Diagnosis:    VT (ventricular tachycardia)     Pulmonary Embolism    Results:  Echo and EKG reviewed    Recommendations:    -labs today  -lower extremity ultrasound today  -6 minute walk test prior to follow up  -CTPE 1 month  -Recommended: PE clinic (Karen), hematology (already saw), warfarin/INR clinic     Follow-up:  3 months with Dr. Jones    For emergencies call 022.    For any scheduling needs, please call [phone].     Thank you for your visit today!     Please call if you have any questions or concerns.  Vini Irving RN

## 2019-06-12 LAB — INTERPRETATION ECG - MUSE: NORMAL

## 2019-06-13 ENCOUNTER — ANTICOAGULATION THERAPY VISIT (OUTPATIENT)
Dept: NURSING | Facility: CLINIC | Age: 29
End: 2019-06-13
Payer: COMMERCIAL

## 2019-06-13 ENCOUNTER — OFFICE VISIT (OUTPATIENT)
Dept: FAMILY MEDICINE | Facility: CLINIC | Age: 29
End: 2019-06-13
Payer: COMMERCIAL

## 2019-06-13 ENCOUNTER — TELEPHONE (OUTPATIENT)
Dept: FAMILY MEDICINE | Facility: CLINIC | Age: 29
End: 2019-06-13

## 2019-06-13 VITALS
BODY MASS INDEX: 41.46 KG/M2 | OXYGEN SATURATION: 100 % | TEMPERATURE: 98.5 F | WEIGHT: 258 LBS | SYSTOLIC BLOOD PRESSURE: 118 MMHG | RESPIRATION RATE: 18 BRPM | HEIGHT: 66 IN | DIASTOLIC BLOOD PRESSURE: 82 MMHG | HEART RATE: 86 BPM

## 2019-06-13 DIAGNOSIS — Z98.84 S/P GASTRIC BYPASS: ICD-10-CM

## 2019-06-13 DIAGNOSIS — R51.9 ACUTE INTRACTABLE HEADACHE, UNSPECIFIED HEADACHE TYPE: Primary | ICD-10-CM

## 2019-06-13 DIAGNOSIS — R35.0 URINARY FREQUENCY: ICD-10-CM

## 2019-06-13 DIAGNOSIS — I26.99 PULMONARY EMBOLISM WITH INFARCTION (H): ICD-10-CM

## 2019-06-13 DIAGNOSIS — R82.90 NONSPECIFIC FINDING ON EXAMINATION OF URINE: ICD-10-CM

## 2019-06-13 DIAGNOSIS — E55.9 VITAMIN D DEFICIENCY: ICD-10-CM

## 2019-06-13 LAB
ALBUMIN UR-MCNC: 30 MG/DL
AMORPH CRY #/AREA URNS HPF: ABNORMAL /HPF
APPEARANCE UR: CLEAR
BACTERIA #/AREA URNS HPF: ABNORMAL /HPF
BILIRUB UR QL STRIP: NEGATIVE
COLOR UR AUTO: YELLOW
FOLATE SERPL-MCNC: 34.5 NG/ML
GLUCOSE UR STRIP-MCNC: NEGATIVE MG/DL
HGB UR QL STRIP: ABNORMAL
INR PPP: 1.24 (ref 0.86–1.14)
KETONES UR STRIP-MCNC: NEGATIVE MG/DL
LEUKOCYTE ESTERASE UR QL STRIP: ABNORMAL
NITRATE UR QL: NEGATIVE
NON-SQ EPI CELLS #/AREA URNS LPF: ABNORMAL /LPF
PH UR STRIP: 6.5 PH (ref 5–7)
RBC #/AREA URNS AUTO: ABNORMAL /HPF
SOURCE: ABNORMAL
SP GR UR STRIP: 1.01 (ref 1–1.03)
UROBILINOGEN UR STRIP-ACNC: 0.2 EU/DL (ref 0.2–1)
VIT B12 SERPL-MCNC: 1012 PG/ML (ref 193–986)
WBC #/AREA URNS AUTO: >100 /HPF

## 2019-06-13 PROCEDURE — 82607 VITAMIN B-12: CPT | Performed by: FAMILY MEDICINE

## 2019-06-13 PROCEDURE — 87186 SC STD MICRODIL/AGAR DIL: CPT | Performed by: FAMILY MEDICINE

## 2019-06-13 PROCEDURE — 81001 URINALYSIS AUTO W/SCOPE: CPT | Performed by: FAMILY MEDICINE

## 2019-06-13 PROCEDURE — 99207 ZZC NO CHARGE NURSE ONLY: CPT | Performed by: PHYSICIAN ASSISTANT

## 2019-06-13 PROCEDURE — 87086 URINE CULTURE/COLONY COUNT: CPT | Performed by: FAMILY MEDICINE

## 2019-06-13 PROCEDURE — 82306 VITAMIN D 25 HYDROXY: CPT | Performed by: FAMILY MEDICINE

## 2019-06-13 PROCEDURE — 82746 ASSAY OF FOLIC ACID SERUM: CPT | Performed by: FAMILY MEDICINE

## 2019-06-13 PROCEDURE — 87088 URINE BACTERIA CULTURE: CPT | Performed by: FAMILY MEDICINE

## 2019-06-13 PROCEDURE — 99214 OFFICE O/P EST MOD 30 MIN: CPT | Performed by: FAMILY MEDICINE

## 2019-06-13 PROCEDURE — 85610 PROTHROMBIN TIME: CPT | Performed by: FAMILY MEDICINE

## 2019-06-13 PROCEDURE — 36415 COLL VENOUS BLD VENIPUNCTURE: CPT | Performed by: FAMILY MEDICINE

## 2019-06-13 RX ORDER — GABAPENTIN 300 MG/1
300 CAPSULE ORAL
Qty: 30 CAPSULE | Refills: 0 | Status: SHIPPED | OUTPATIENT
Start: 2019-06-13 | End: 2019-06-20

## 2019-06-13 ASSESSMENT — MIFFLIN-ST. JEOR: SCORE: 1917.03

## 2019-06-13 NOTE — TELEPHONE ENCOUNTER
Reason for Call:  INR    Who is calling?  Hilaria    Phone number:  [phone] or [phone]    Fax number:      Name of caller: Hilaria    INR Value:  1.24    Are there any other concerns:  No    Can we leave a detailed message on this number? YES    Phone number patient can be reached at: Home number on file [phone] (home)    Has only 1 injection left.     Call taken on 6/13/2019 at 1:05 PM by Arron Black

## 2019-06-13 NOTE — PATIENT INSTRUCTIONS
Try skipping your melatonin tonight. If you are having a hard time falling asleep, you can get up and take it.

## 2019-06-13 NOTE — TELEPHONE ENCOUNTER
Please see the June 13, 2019 Anticoagulation encounter for further information.      Ayana Tay RN, BSN, PHN, [facility]

## 2019-06-13 NOTE — TELEPHONE ENCOUNTER
Reason for Call:  Other call back    Detailed comments: Hilaria missed some Speech Therapy classes this week and Judy the Therapist is asking if she can make those up nest week. Asking for 3 times a week for 1 week. Next week only.  Thank you     Phone Number Patient can be reached at: [phone]    Best Time: Any    Can we leave a detailed message on this number? YES    Call taken on 6/13/2019 at 4:21 PM by Arron Black

## 2019-06-13 NOTE — PROGRESS NOTES
ANTICOAGULATION FOLLOW-UP CLINIC VISIT    Patient Name:  Hilaria Bonner  Date:  2019  Contact Type:  Telephone    SUBJECTIVE:  Patient Findings         Clinical Outcomes     Negatives:   Major bleeding event, Thromboembolic event, Anticoagulation-related hospital admission, Anticoagulation-related ED visit, Anticoagulation-related fatality           OBJECTIVE    INR   Date Value Ref Range Status   2019 1.24 (H) 0.86 - 1.14 Final       ASSESSMENT / PLAN  INR assessment SUB    Recheck INR In: 4 DAYS    INR Location [facility] INR      Anticoagulation Summary  As of 2019    INR goal:   2.0-3.0   TTR:   0.0 % (4 d)   INR used for dosin.24! (2019)   Warfarin maintenance plan:   No maintenance plan   Full warfarin instructions:   : 4 mg; : 6 mg; 6/15: 4 mg; : 4 mg; Otherwise No maintenance plan   Next INR check:   2019   Target end date:            Anticoagulation Episode Summary     INR check location:   Anticoagulation Clinic    Preferred lab:       Send INR reminders to:   [facility]    Comments:         Anticoagulation Care Providers     Provider Role Specialty Phone number    Crissy Madrigal PA-C Referring [facility] [phone]            See the Encounter Report to view Anticoagulation Flowsheet and Dosing Calendar (Go to Encounters tab in chart review, and find the Anticoagulation Therapy Visit)    Dosage adjustment made based on physician directed care plan. Dosage increased by 8% due to subtherapeutic INR. Refilled Lovenox injections for patient as she needs to continue bridging at this time. Patient scheduled for lab draw on 19 as she is not sure if home care is going to be out that day or not for patient. The patient was assessed for diet, medication, and activity level changes, missed or extra doses, bruising or bleeding, with no problem findings.        Ayana Tay RN, BSN, PHN

## 2019-06-13 NOTE — PROGRESS NOTES
Subjective     Hilaria Bonner is a 28 year old female who presents to clinic today for the following health issues:    Patient voices that she's been Dx with Migraines for a long time now.     HPI   Migraine     Since your last clinic visit, how have your headaches changed?  Worsened    How often are you getting headaches or migraines? Everyday      Are you able to do normal daily activities when you have a migraine? No    Are you taking rescue/relief medications? (Select all that apply) N/A     How helpful is your rescue/relief medication?  N/A    Are you taking any medications to prevent migraines? (Select all that apply)  Tylenol or Pain meds    In the past 4 weeks, how often have you gone to urgent care or the emergency room because of your headaches?  0     -Patient has been waking up with headaches for about a week. Pain is mainly in the front of her head. When she wakes pain is usually 6-7/10, at its worst has been 10/10  -When they occur she feels weak diffusely (not more on one side) and wants to lay down. Yesterday she had brief dizziness when walking up steps and also had nausea and heaving  -Denies vision changes   -Patient got her Depo shot 9 days ago. At that visit she mentioned that she had had a headache but it had gone away. She thinks it is possible her worsened headaches are since she got the Depo shot, but since her memory has been worse after her fall and MI, she is not sure   -She takes Percocet for her chest pain 1-2 times per day, which helps with headaches as well but it does not completely resolve. She is also taking Tylenol. Rest and heat/cold have not helped with headache  -She was told the chest pain is likely due to CPR. She does not think the pain is improving. It hurts with certain movements   -About a month ago she fell and hit her head on a gas pump after she lost consciousness when she had a PE and MI. Since this happened she has been having problems with her memory, but her  "speech is improving   -Currently taking Coumadin and Lovenox injections (subtherapeutic INR) following her massive PE. She is due for her INR today   -Patient is doing PT following hospitalization   -She has a history of migraines about 10 years ago, but her current headaches are worse than what she experienced then, both in terms of pain and frequency. Her migraines were only on one side   -She has been taking 3 Topamax nightly for appetite suppression for a while    Additional:  -Patient reports her mood has been \"up and down\" in the last month since her cardiac arrest. She has been anxious  -Cardiology recommended patient do neuropsychological testing   -She has been having trouble sleeping. GUY Dickinson recommended melatonin. Patient reports this sometimes helps but sometimes doesn't. She is not sure how long it takes her to fall asleep. She does wake during the night to urinate almost every hour. She urinates that frequently during the day as well, and complains of urinary urgency and incontinence. She denies dysuria. These urinary symptoms started about 2 weeks ago  -Denies diarrhea, constipation   -She has been taking folic acid and other vitamins daily since her levels were low when they were checked. She had gastric bypass surgery 3 months ago and has lost 60-70 pounds in that time         Amount of exercise or physical activity: None    Problems taking medications regularly: No    Medication side effects: none    Diet: Low calorie         Patient Active Problem List   Diagnosis     Morbid obesity (H)     Vitamin D deficiency     Elevated parathyroid hormone     Encounter for smoking cessation counseling     Menorrhagia with irregular cycle     Morbid (severe) obesity due to excess calories (H)     Pulmonary embolism with infarction (H)     Past Surgical History:   Procedure Laterality Date     GYN SURGERY      2 C-sections     KNEE SURGERY  most recently - [id number]    3 surgeries in [place]     " "LAPAROSCOPIC GASTRIC SLEEVE N/A 3/26/2019    Procedure: Laparoscopic Sleeve Gastrectomy;  Surgeon: Luan Lopez MD;  Location: [facility] OR     ORTHOPEDIC SURGERY      2 knee meniscus surgery       Social History     Tobacco Use     Smoking status: Former Smoker     Years: 1.00     Start date: 2016     Last attempt to quit: 2018     Years since quittin.4     Smokeless tobacco: Never Used   Substance Use Topics     Alcohol use: Yes     Alcohol/week: 0.0 oz     Comment: occasional     Family History   Problem Relation Age of Onset     Diabetes Father      Hypertension Father      Breast Cancer Sister 26        surgery only     Cancer Sister      Coronary Artery Disease Other         paternal aunt     Thyroid Disease Other         neice     Coronary Artery Disease Other      Cerebrovascular Disease Other      Breast Cancer Sister      Thyroid Disease Other      Cerebrovascular Disease No family hx of              Reviewed and updated as needed this visit by Provider         Review of Systems   ROS COMP: Constitutional, HEENT, cardiovascular, pulmonary, gi and gu systems are negative, except as otherwise noted.    This document serves as a record of the services and decisions personally performed by ALEX DUBON. It was created on his/her behalf by Nai Nowak, a trained medical scribe. The creation of this document is based on the provider's statements to the medical scribe. Nai Nowak, 2019 7:56 AM      Objective    /82 (BP Location: Right arm, Patient Position: Sitting, Cuff Size: Adult Large)   Pulse 86   Temp 98.5  F (36.9  C) (Oral)   Resp 18   Ht 1.676 m (5' 6\")   Wt 117 kg (258 lb)   LMP  (LMP Unknown)   SpO2 100%   BMI 41.64 kg/m    Body mass index is 41.64 kg/m .  Physical Exam   GENERAL: healthy, alert and no distress  HENT: bruising under bilateral eyes and hematoma forehead is non-tender. Mild face fullness, ear canals and TM's normal, nose and " mouth without ulcers or lesions  NECK: no adenopathy, no asymmetry, masses, or scars and thyroid normal to palpation  RESP: lungs clear to auscultation - no rales, rhonchi or wheezes  CV: regular rate and rhythm, normal S1 S2, no S3 or S4, no murmur, click or rub, no peripheral edema and peripheral pulses strong  ABDOMEN: soft, nontender, no hepatosplenomegaly, no masses and bowel sounds normal  MS: no gross musculoskeletal defects noted, no edema  NEURO: Normal strength and tone, mentation intact and speech normal. Cranial nerves II-XII intact. Normal sensory exam. Normal gait. Normal heel, toe, heel-to-toe walk. Negative Romberg   PSYCH: mentation appears normal, affect normal/bright    5/13/19 CT head:  Impression:  1. No evidence of acute intracranial hemorrhage.  2. Anterior scalp hematoma without underlying fracture        Assessment & Plan     1. Acute intractable headache, unspecified headache type  Recent trauma but neg head ct. Patient has been sleeping poorly, partly due to insomnia, urinary frequency and likely mood changes. No focal neuro deficits today. Will trial gabapentin for headaches as this may also help her sleep which hopefully should help the headaches too.  Reviewed red flag symptoms that would precipitate the need for routine, urgent or emergent f/u. F/u in 1 week. Consider mr if persistent issues  - gabapentin (NEURONTIN) 300 MG capsule; Take 1 capsule (300 mg) by mouth nightly as needed  Dispense: 30 capsule; Refill: 0    2. Pulmonary embolism with infarction (H)  Continue anticoagulation therapy, monitor INR  INR today, anticoag clinic folllowing    3. Vitamin D deficiency  Adjust therapy based on labs    4. Urinary frequency  - UA reflex to Microscopic and Culture    5. S/P gastric bypass. She has had significant weight loss since surgery  - Folate  - Vitamin B12  - Vitamin D Deficiency     BMI:   Estimated body mass index is 41.64 kg/m  as calculated from the following:    Height as of  "this encounter: 1.676 m (5' 6\").    Weight as of this encounter: 117 kg (258 lb).   Weight management plan: s/p recent gastric bypass      Patient Instructions   Try skipping your melatonin tonight. If you are having a hard time falling asleep, you can get up and take it.       Return in about 1 week (around 6/20/2019) for Recheck.     Length of visit was 31 minutes with more than 50 percent of that time used for discussing medical concerns and education    The information in this document, created by the medical scribe for me, accurately reflects the services I personally performed and the decisions made by me. I have reviewed and approved this document for accuracy.   Sophia Welsh MD  [facility]        "

## 2019-06-14 ENCOUNTER — HOSPITAL ENCOUNTER (EMERGENCY)
Facility: CLINIC | Age: 29
Discharge: HOME | End: 2019-06-14
Attending: EMERGENCY MEDICINE | Admitting: EMERGENCY MEDICINE
Payer: COMMERCIAL

## 2019-06-14 ENCOUNTER — TELEPHONE (OUTPATIENT)
Dept: FAMILY MEDICINE | Facility: CLINIC | Age: 29
End: 2019-06-14

## 2019-06-14 ENCOUNTER — ANCILLARY PROCEDURE (OUTPATIENT)
Dept: ULTRASOUND IMAGING | Facility: CLINIC | Age: 29
End: 2019-06-14
Attending: INTERNAL MEDICINE
Payer: COMMERCIAL

## 2019-06-14 ENCOUNTER — APPOINTMENT (OUTPATIENT)
Dept: CT IMAGING | Facility: CLINIC | Age: 29
End: 2019-06-14
Attending: EMERGENCY MEDICINE
Payer: COMMERCIAL

## 2019-06-14 VITALS
SYSTOLIC BLOOD PRESSURE: 140 MMHG | WEIGHT: 260.2 LBS | HEIGHT: 66 IN | DIASTOLIC BLOOD PRESSURE: 97 MMHG | BODY MASS INDEX: 41.82 KG/M2 | OXYGEN SATURATION: 100 % | HEART RATE: 79 BPM | TEMPERATURE: 98.2 F | RESPIRATION RATE: 16 BRPM

## 2019-06-14 DIAGNOSIS — Z79.01 LONG TERM (CURRENT) USE OF ANTICOAGULANTS: ICD-10-CM

## 2019-06-14 DIAGNOSIS — R07.89 OTHER CHEST PAIN: ICD-10-CM

## 2019-06-14 LAB
ANION GAP SERPL CALCULATED.3IONS-SCNC: 2 MMOL/L (ref 3–14)
BASOPHILS # BLD AUTO: 0 10E9/L (ref 0–0.2)
BASOPHILS NFR BLD AUTO: 0.5 %
BUN SERPL-MCNC: 6 MG/DL (ref 7–30)
CALCIUM SERPL-MCNC: 8.9 MG/DL (ref 8.5–10.1)
CHLORIDE SERPL-SCNC: 110 MMOL/L (ref 94–109)
CO2 SERPL-SCNC: 27 MMOL/L (ref 20–32)
CREAT BLD-MCNC: 0.9 MG/DL (ref 0.52–1.04)
CREAT SERPL-MCNC: 1.11 MG/DL (ref 0.52–1.04)
DEPRECATED CALCIDIOL+CALCIFEROL SERPL-MC: 22 UG/L (ref 20–75)
DIFFERENTIAL METHOD BLD: ABNORMAL
EOSINOPHIL # BLD AUTO: 0.1 10E9/L (ref 0–0.7)
EOSINOPHIL NFR BLD AUTO: 0.8 %
ERYTHROCYTE [DISTWIDTH] IN BLOOD BY AUTOMATED COUNT: 16.6 % (ref 10–15)
GFR SERPL CREATININE-BSD FRML MDRD: 67 ML/MIN/{1.73_M2}
GFR SERPL CREATININE-BSD FRML MDRD: 75 ML/MIN/{1.73_M2}
GLUCOSE SERPL-MCNC: 84 MG/DL (ref 70–99)
HCT VFR BLD AUTO: 32.5 % (ref 35–47)
HGB BLD-MCNC: 9.8 G/DL (ref 11.7–15.7)
IMM GRANULOCYTES # BLD: 0 10E9/L (ref 0–0.4)
IMM GRANULOCYTES NFR BLD: 0.3 %
INR PPP: 1.33 (ref 0.86–1.14)
LYMPHOCYTES # BLD AUTO: 2.1 10E9/L (ref 0.8–5.3)
LYMPHOCYTES NFR BLD AUTO: 32.7 %
MCH RBC QN AUTO: 32.7 PG (ref 26.5–33)
MCHC RBC AUTO-ENTMCNC: 30.2 G/DL (ref 31.5–36.5)
MCV RBC AUTO: 108 FL (ref 78–100)
MONOCYTES # BLD AUTO: 0.6 10E9/L (ref 0–1.3)
MONOCYTES NFR BLD AUTO: 8.7 %
NEUTROPHILS # BLD AUTO: 3.7 10E9/L (ref 1.6–8.3)
NEUTROPHILS NFR BLD AUTO: 57 %
NRBC # BLD AUTO: 0 10*3/UL
NRBC BLD AUTO-RTO: 0 /100
PLATELET # BLD AUTO: 357 10E9/L (ref 150–450)
POTASSIUM SERPL-SCNC: 3.4 MMOL/L (ref 3.4–5.3)
RBC # BLD AUTO: 3 10E12/L (ref 3.8–5.2)
SODIUM SERPL-SCNC: 139 MMOL/L (ref 133–144)
WBC # BLD AUTO: 6.6 10E9/L (ref 4–11)

## 2019-06-14 PROCEDURE — 71260 CT THORAX DX C+: CPT

## 2019-06-14 PROCEDURE — 82565 ASSAY OF CREATININE: CPT

## 2019-06-14 PROCEDURE — 99284 EMERGENCY DEPT VISIT MOD MDM: CPT | Mod: Z6 | Performed by: EMERGENCY MEDICINE

## 2019-06-14 PROCEDURE — 80048 BASIC METABOLIC PNL TOTAL CA: CPT | Performed by: EMERGENCY MEDICINE

## 2019-06-14 PROCEDURE — 85610 PROTHROMBIN TIME: CPT | Performed by: EMERGENCY MEDICINE

## 2019-06-14 PROCEDURE — 99285 EMERGENCY DEPT VISIT HI MDM: CPT | Mod: 25

## 2019-06-14 PROCEDURE — 25000128 H RX IP 250 OP 636: Performed by: STUDENT IN AN ORGANIZED HEALTH CARE EDUCATION/TRAINING PROGRAM

## 2019-06-14 PROCEDURE — 85025 COMPLETE CBC W/AUTO DIFF WBC: CPT | Performed by: EMERGENCY MEDICINE

## 2019-06-14 PROCEDURE — 99285 EMERGENCY DEPT VISIT HI MDM: CPT | Mod: 25 | Performed by: EMERGENCY MEDICINE

## 2019-06-14 PROCEDURE — 93970 EXTREMITY STUDY: CPT | Performed by: RADIOLOGY

## 2019-06-14 RX ORDER — NITROFURANTOIN 25; 75 MG/1; MG/1
100 CAPSULE ORAL 2 TIMES DAILY
Qty: 14 CAPSULE | Refills: 0 | Status: SHIPPED | OUTPATIENT
Start: 2019-06-14 | End: 2019-07-31

## 2019-06-14 RX ORDER — IOPAMIDOL 755 MG/ML
74 INJECTION, SOLUTION INTRAVASCULAR ONCE
Status: COMPLETED | OUTPATIENT
Start: 2019-06-14 | End: 2019-06-14

## 2019-06-14 RX ADMIN — IOPAMIDOL 74 ML: 755 INJECTION, SOLUTION INTRAVENOUS at 15:33

## 2019-06-14 ASSESSMENT — MIFFLIN-ST. JEOR: SCORE: 1927.01

## 2019-06-14 NOTE — ED NOTES
Bed: IN01  Expected date:   Expected time:   Means of arrival:   Comments:  Richelle Bonner  History of PE and recent Cardiac arrest, now with a Femoral PSA, Admit to Cardiology 1.      Dmitri Luciano MD  06/14/19 [id number]

## 2019-06-14 NOTE — CONSULTS
Vascular Surgery Consult    Reason for consult: Right femoral pseudoaneurysm    HPI:   29 yo female with history of massive PE approximately 1 month ago that caused PEA cardiac arrest, requiring 50 minutes of CPR.  She underwent tPA infusion in [place], [place].  Her stay was complicated by rib fractures, ischemic colitis leading to acute anemia, ARAMIS and sinusitis.  Of note she had a sleeve gastrectomy March 2019.    Overall patient has been feeling well.  She denies any right groin pain or any other new symptoms.  She complains of moderate bilateral calf tingling and tightness but does not describe it to be consistent with arterial claudication.  She thinks the right groin access site was a catheter or likely arterial line but she is unsure and notes do not clearly delineate this.      PMH:   Past Medical History:   Diagnosis Date     Acute kidney injury (H) 05/13/2019     Cardiac arrest (H) 05/13/2019     Earache or other ear, nose, or throat complaint 12/15    tyroid     Pulmonary embolus (H) 05/13/2019       PSH:   Past Surgical History:   Procedure Laterality Date     GYN SURGERY      2 C-sections     KNEE SURGERY  most recently - [id number]    3 surgeries in [place]     LAPAROSCOPIC GASTRIC SLEEVE N/A 3/26/2019    Procedure: Laparoscopic Sleeve Gastrectomy;  Surgeon: Luan Lopez MD;  Location: [facility] OR     ORTHOPEDIC SURGERY      2 knee meniscus surgery       MEDS:   Current Facility-Administered Medications   Medication     medroxyPROGESTERone (DEPO-PROVERA) syringe 150 mg     Current Outpatient Medications   Medication     acetaminophen (TYLENOL) 325 MG tablet     B Complex Vitamins (VITAMIN-B COMPLEX) TABS     CALCIUM 500/D 500-400 MG-UNIT CHEW     Calcium Citrate-Vitamin D 500-500 MG-UNIT CHEW     CHANTIX STARTING MONTH AMADO 0.5 MG X 11 & 1 MG X 42 tablet     childrens multivitamin w/iron (FLINTSTONES COMPLETE) 60 MG chewable tablet     Cyanocobalamin (B-12) 500 MCG SUBL     cyanocobalamin (VITAMIN B-12) 1000  MCG tablet     enoxaparin (LOVENOX) 120 MG/0.8ML syringe     ferrous sulfate (FEROSUL) 325 (65 Fe) MG tablet     fluticasone (FLONASE) 50 MCG/ACT nasal spray     folic acid (FOLVITE) 1 MG tablet     gabapentin (NEURONTIN) 300 MG capsule     magic mouthwash (FIRST-MOUTHWASH BLM) compounding kit     Multiple Vitamins-Minerals (MULTIVITAMIN ADULTS PO)     nitroFURantoin macrocrystal-monohydrate (MACROBID) 100 MG capsule     omeprazole (PRILOSEC) 20 MG DR capsule     ondansetron (ZOFRAN-ODT) 4 MG ODT tab     senna-docusate (SENOKOT-S/PERICOLACE) 8.6-50 MG tablet     topiramate (TOPAMAX) 25 MG tablet     ursodiol (ACTIGALL) 300 MG capsule     vitamin  s/Minerals TABS     vitamin B complex with vitamin C (VITAMIN  B COMPLEX) tablet     vitamin C (ASCORBIC ACID) 1000 MG TABS     vitamin D3 (CHOLECALCIFEROL) 2000 units tablet     vitamin D3 (VITAMIN D3) 1000 units (25 mcg) tablet     warfarin (COUMADIN) 2 MG tablet     Facility-Administered Medications Ordered in Other Encounters   Medication     sodium chloride (PF) 0.9% PF flush 10 mL       ALLG:  No Known Allergies    FH:   Family History   Problem Relation Age of Onset     Diabetes Father      Hypertension Father      Breast Cancer Sister 26        surgery only     Cancer Sister      Coronary Artery Disease Other         paternal aunt     Thyroid Disease Other         neice     Coronary Artery Disease Other      Cerebrovascular Disease Other      Breast Cancer Sister      Thyroid Disease Other      Cerebrovascular Disease No family hx of        SH:   Social History     Socioeconomic History     Marital status: Single     Spouse name: Not on file     Number of children: Not on file     Years of education: Not on file     Highest education level: Not on file   Occupational History     Not on file   Social Needs     Financial resource strain: Not on file     Food insecurity:     Worry: Not on file     Inability: Not on file     Transportation needs:     Medical: Not on  file     Non-medical: Not on file   Tobacco Use     Smoking status: Former Smoker     Years: 1.00     Start date: 2016     Last attempt to quit: 2018     Years since quittin.4     Smokeless tobacco: Never Used   Substance and Sexual Activity     Alcohol use: Not Currently     Alcohol/week: 0.0 oz     Comment: occasional     Drug use: No     Sexual activity: Yes     Partners: Male   Lifestyle     Physical activity:     Days per week: Not on file     Minutes per session: Not on file     Stress: Not on file   Relationships     Social connections:     Talks on phone: Not on file     Gets together: Not on file     Attends Hinduism service: Not on file     Active member of club or organization: Not on file     Attends meetings of clubs or organizations: Not on file     Relationship status: Not on file     Intimate partner violence:     Fear of current or ex partner: Not on file     Emotionally abused: Not on file     Physically abused: Not on file     Forced sexual activity: Not on file   Other Topics Concern     Parent/sibling w/ CABG, MI or angioplasty before 65F 55M? Not Asked   Social History Narrative     Not on file       REVIEW OF SYSTEMS:  GENERAL: No fevers, chills, weight gain or loss  HEENT: No vision or hearing difficulties,+ dark eyes from fall and recent trauma  RESPIRATORY: No cough wheeze, or hemoptosis  CARDIAC: No chest pain, orthopnea, lower extremity edema, irregular heartbeat  GI: No abdominal pain, difficulty swallowing, nausea or vomiting  : no dysuria, urgency, frequency or hematuria  HEME/ONC: no abnormal bleeding or bruising, no hypercoaguable state  NEURO: No numbness, weakness, dizziness or loss of consciousness  MUSCULOSKELETAL: No Joint or back pain, + mild bilateral LE edema  SKIN: no rashes, growths, sores, itching or hair loss    PHYSICAL EXAM:  Vitals: B/P: 145/102, T: 98.2, P: 77, R: 16  Gen: Alert oriented no acute distress  HEENT: Mucous membranes moist  Resp: Clear  to auscultation bilaterally  CV: Regular rate rhythm, no murmurs, rubs or gallops  Abd: Soft, obese, nontender  Neuro:  Neuro intact, moves all extremities 4/4 strength equal bilaterally  Ext: Small palpable mass in right groin, nontender no overlying skin changes  Mild edema bilateral lower extremities but palpable pedal pulses bilaterally.    Labs: Most recent labs reviewed    CBC RESULTS:   Recent Labs   Lab Test 06/14/19  1516   WBC 6.6   RBC 3.00*   HGB 9.8*   HCT 32.5*   *   MCH 32.7   MCHC 30.2*   RDW 16.6*        INR   Date Value Ref Range Status   06/14/2019 1.33 (H) 0.86 - 1.14 Final      Last Comprehensive Metabolic Panel:  Sodium   Date Value Ref Range Status   06/14/2019 139 133 - 144 mmol/L Final     Potassium   Date Value Ref Range Status   06/14/2019 3.4 3.4 - 5.3 mmol/L Final     Chloride   Date Value Ref Range Status   06/14/2019 110 (H) 94 - 109 mmol/L Final     Carbon Dioxide   Date Value Ref Range Status   06/14/2019 27 20 - 32 mmol/L Final     Anion Gap   Date Value Ref Range Status   06/14/2019 2 (L) 3 - 14 mmol/L Final     Glucose   Date Value Ref Range Status   06/14/2019 84 70 - 99 mg/dL Final     Urea Nitrogen   Date Value Ref Range Status   06/14/2019 6 (L) 7 - 30 mg/dL Final     Creatinine   Date Value Ref Range Status   06/14/2019 1.11 (H) 0.52 - 1.04 mg/dL Final     GFR Estimate   Date Value Ref Range Status   06/14/2019 67 >60 mL/min/[1.73_m2] Final     Comment:     Non  GFR Calc  Starting 12/18/2018, serum creatinine based estimated GFR (eGFR) will be   calculated using the Chronic Kidney Disease Epidemiology Collaboration   (CKD-EPI) equation.       Calcium   Date Value Ref Range Status   06/14/2019 8.9 8.5 - 10.1 mg/dL Final         IMAGING:   Venous US 6/14/19:  Impression:  1. 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  2. No deep vein thrombosis in either lower  extremity.    A/P: 29 yo female with right femoral pseudoaneurysm 2.6 x 1.7 cm, iatrogenic at time of cardiac arrest due to massive PE, she had TPA infusion during that event also.    The pseudoaneurysm is partially thrombosed, asymptomatic and occurred 1 month ago.  We recommend follow up in 2-4 weeks at the vascular surgery clinic with repeat US.  Will consider compression, thrombin injection or open repair at that time depending on findings.  Ok to continue anticoagulation at present.    Discussed case with Dr. Orellana who is in agreement of the above plan.    Teresa Delatorre MD  Vascular Surgery Fellow    VASCULAR SURGERY ATTENDING NOTE:  I was called by Dr. Lejeune about this 28 year old female with a right femoral pseudoaneurysm, 2.6 cm x 1.7 cm. No need for any urgent surgical intervention. Follow up in clinic with Dr. Narayanan or Nova.     Yoel Orellana M.D.

## 2019-06-14 NOTE — TELEPHONE ENCOUNTER
Called and spoke with Judy ROMERO. Approval given for request  orders, 3 x wk x 1 wk.    Mimi Marques RN

## 2019-06-14 NOTE — DISCHARGE INSTRUCTIONS
Please follow up with Vascular surgery in 2-4 weeks.   Please call [phone] to make an appointment.

## 2019-06-14 NOTE — TELEPHONE ENCOUNTER
[facility] physician order form placed on Dr. Davis's desk for completion.      Noemi OLIVO (R))

## 2019-06-14 NOTE — ED AVS SNAPSHOT
[facility], [place], Emergency Department  [address] [place] [id number]  Phone:  [phone]                                    Hilaria Bonner   MRN: [id number]    Department:  [facility], Emergency Department   Date of Visit:  6/14/2019           After Visit Summary Signature Page    I have received my discharge instructions, and my questions have been answered. I have discussed any challenges I see with this plan with the nurse or doctor.    ..........................................................................................................................................  Patient/Patient Representative Signature      ..........................................................................................................................................  Patient Representative Print Name and Relationship to Patient    ..................................................               ................................................  Date                                   Time    ..........................................................................................................................................  Reviewed by Signature/Title    ...................................................              ..............................................  Date                                               Time          22EPIC Rev 08/18

## 2019-06-14 NOTE — ED PROVIDER NOTES
[place] EMERGENCY DEPARTMENT ([facility])  6/14/19    History     Chief Complaint   Patient presents with     Leg Pain     The history is provided by the patient and medical records.     Hilaria Bonner is a 28 year old female with a history of PE with PEA arrest (05/13/2019) who presents to the Emergency Department from [facility]. Patient had an ultrasound of the deep venous system prior to arrival; please see preliminary results below. Patient states that she does not currently have lower extremity pain. She reports chest pain but notes that this is due to chest compressions. The chest pain worsens when taking a deep breath in or with movement. Patient states that she has been taking Coumadin as prescribed.      Exam: Ultrasound of the deep venous system of bilateral legs dated  6/14/2019 10:58 AM  Impression:  1. 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  2. No deep vein thrombosis in either lower extremity.    Current Facility-Administered Medications   Medication     medroxyPROGESTERone (DEPO-PROVERA) syringe 150 mg     Current Outpatient Medications   Medication     acetaminophen (TYLENOL) 325 MG tablet     B Complex Vitamins (VITAMIN-B COMPLEX) TABS     CALCIUM 500/D 500-400 MG-UNIT CHEW     Calcium Citrate-Vitamin D 500-500 MG-UNIT CHEW     CHANTIX STARTING MONTH AMADO 0.5 MG X 11 & 1 MG X 42 tablet     childrens multivitamin w/iron (FLINTSTONES COMPLETE) 60 MG chewable tablet     Cyanocobalamin (B-12) 500 MCG SUBL     cyanocobalamin (VITAMIN B-12) 1000 MCG tablet     enoxaparin (LOVENOX) 120 MG/0.8ML syringe     ferrous sulfate (FEROSUL) 325 (65 Fe) MG tablet     fluticasone (FLONASE) 50 MCG/ACT nasal spray     folic acid (FOLVITE) 1 MG tablet     gabapentin (NEURONTIN) 300 MG capsule     magic mouthwash (FIRST-MOUTHWASH BLM) compounding kit     Multiple Vitamins-Minerals (MULTIVITAMIN ADULTS PO)      nitroFURantoin macrocrystal-monohydrate (MACROBID) 100 MG capsule     omeprazole (PRILOSEC) 20 MG DR capsule     ondansetron (ZOFRAN-ODT) 4 MG ODT tab     senna-docusate (SENOKOT-S/PERICOLACE) 8.6-50 MG tablet     topiramate (TOPAMAX) 25 MG tablet     ursodiol (ACTIGALL) 300 MG capsule     vitamin  s/Minerals TABS     vitamin B complex with vitamin C (VITAMIN  B COMPLEX) tablet     vitamin C (ASCORBIC ACID) 1000 MG TABS     vitamin D3 (CHOLECALCIFEROL) 2000 units tablet     vitamin D3 (VITAMIN D3) 1000 units (25 mcg) tablet     warfarin (COUMADIN) 2 MG tablet     Facility-Administered Medications Ordered in Other Encounters   Medication     sodium chloride (PF) 0.9% PF flush 10 mL     Past Medical History:   Diagnosis Date     Acute kidney injury (H) 2019     Cardiac arrest (H) 2019     Earache or other ear, nose, or throat complaint 12/15    tyroid     Pulmonary embolus (H) 2019       Past Surgical History:   Procedure Laterality Date     GYN SURGERY      2 C-sections     KNEE SURGERY  most recently - [id number]    3 surgeries in [place]     LAPAROSCOPIC GASTRIC SLEEVE N/A 3/26/2019    Procedure: Laparoscopic Sleeve Gastrectomy;  Surgeon: Luan Lopez MD;  Location: [facility] OR     ORTHOPEDIC SURGERY      2 knee meniscus surgery       Family History   Problem Relation Age of Onset     Diabetes Father      Hypertension Father      Breast Cancer Sister 26        surgery only     Cancer Sister      Coronary Artery Disease Other         paternal aunt     Thyroid Disease Other         neice     Coronary Artery Disease Other      Cerebrovascular Disease Other      Breast Cancer Sister      Thyroid Disease Other      Cerebrovascular Disease No family hx of        Social History     Tobacco Use     Smoking status: Former Smoker     Years: 1.00     Start date: 2016     Last attempt to quit: 2018     Years since quittin.4     Smokeless tobacco: Never Used   Substance Use Topics     Alcohol  "use: Not Currently     Alcohol/week: 0.0 oz     Comment: occasional     No Known Allergies    I have reviewed the Medications, Allergies, Past Medical and Surgical History, and Social History in the Epic system.    Review of Systems   Cardiovascular: Positive for chest pain (d/t chest compressions).   All other systems reviewed and are negative.      Physical Exam   BP: (!) 145/102  Pulse: 77  Temp: 98.2  F (36.8  C)  Resp: 16  Height: 167.6 cm (5' 6\")  Weight: 118 kg (260 lb 3.2 oz)  SpO2: 100 %      Physical Exam   Constitutional: She is oriented to person, place, and time. No distress.   HENT:   Head: Normocephalic and atraumatic.   Eyes: Pupils are equal, round, and reactive to light. Conjunctivae are normal.   Neck: Normal range of motion. Neck supple.   Cardiovascular: Normal rate and intact distal pulses.   Pulmonary/Chest: Effort normal. No respiratory distress.   Abdominal: Soft. There is no tenderness. There is no rebound and no guarding.   Musculoskeletal: Normal range of motion.   R inguinal area without erythema or swelling   Neurological: She is alert and oriented to person, place, and time.   Skin: Skin is warm and dry. She is not diaphoretic.   Psychiatric: She has a normal mood and affect. Her behavior is normal. Thought content normal.   Nursing note and vitals reviewed.      ED Course        Procedures             Critical Care time:  none             Labs Ordered and Resulted from Time of ED Arrival Up to the Time of Departure from the ED - No data to display         Assessments & Plan (with Medical Decision Making)   This is a 28-year-old female with a history of PEA arrest secondary to a pulmonary embolism who presents with a right inguinal PSA.  The PSA was measured to be 2.6x 1.7cm.  I discussed the case with cardiology who recommended a CT which showed no evidence of PE.  It was recommended that she follow up in cardiology clinic. No intervention was recommended for the PSA.     I have " reviewed the nursing notes.    I have reviewed the findings, diagnosis, plan and need for follow up with the patient.       Medication List      There are no discharge medications for this visit.         Final diagnoses:   None   I, Naye Morrissey, am serving as a trained medical scribe to document services personally performed by Dmitri Luciano MD, based on the provider's statements to me.   Dmitri GUTIERREZ MD, was physically present and have reviewed and verified the accuracy of this note documented by Naye Morrissey.    6/14/2019   [facility], [place], EMERGENCY DEPARTMENT     Dmitri Luciano MD  06/20/19 [id number]

## 2019-06-15 LAB
BACTERIA SPEC CULT: ABNORMAL
SPECIMEN SOURCE: ABNORMAL

## 2019-06-17 ENCOUNTER — TELEPHONE (OUTPATIENT)
Dept: FAMILY MEDICINE | Facility: CLINIC | Age: 29
End: 2019-06-17

## 2019-06-17 ENCOUNTER — PATIENT OUTREACH (OUTPATIENT)
Dept: CARE COORDINATION | Facility: CLINIC | Age: 29
End: 2019-06-17

## 2019-06-17 ENCOUNTER — ANTICOAGULATION THERAPY VISIT (OUTPATIENT)
Dept: NURSING | Facility: CLINIC | Age: 29
End: 2019-06-17
Payer: COMMERCIAL

## 2019-06-17 DIAGNOSIS — I26.99 PULMONARY EMBOLISM WITH INFARCTION (H): ICD-10-CM

## 2019-06-17 LAB — INR PPP: 1.3 (ref 0.86–1.14)

## 2019-06-17 PROCEDURE — 99207 ZZC NO CHARGE NURSE ONLY: CPT | Performed by: PHYSICIAN ASSISTANT

## 2019-06-17 PROCEDURE — 85610 PROTHROMBIN TIME: CPT | Performed by: PHYSICIAN ASSISTANT

## 2019-06-17 PROCEDURE — 36415 COLL VENOUS BLD VENIPUNCTURE: CPT | Performed by: PHYSICIAN ASSISTANT

## 2019-06-17 ASSESSMENT — ACTIVITIES OF DAILY LIVING (ADL): DEPENDENT_IADLS:: INDEPENDENT

## 2019-06-17 NOTE — TELEPHONE ENCOUNTER
[place] forms placed on Dr. Greenfield desk for completion.    Carolann ESTRELLA, Patient Care

## 2019-06-17 NOTE — PROGRESS NOTES
Clinic Care Coordination Contact    Situation: Patient chart reviewed by care coordinator.    Background: 28 year old female with history of gastric sleeve placement, massive pulmonary emboli with cardiac arrest.     Assessment: Patient was instructed to be seen in ED at [facility] on 6/14/19 by Dr. Jones following an utrasound of the deep venous system.   Impression:  1. 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  2. No deep vein thrombosis in either lower extremity.    Outcome:    Impression:  1. 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  2. No deep vein thrombosis in either lower extremity.    Plan/Recommendations: Patient will follow up with vascular as instructed.     Patient will attend scheduled PCP appointment on 6/20/19.    Clinic care coordinator will continue to follow.     Monica Hernandez RN, [facility] RN Coordinator  [facility]   6/17/2019    8:42 AM  [phone]

## 2019-06-17 NOTE — PROGRESS NOTES
ANTICOAGULATION FOLLOW-UP CLINIC VISIT    Patient Name:  Hilaria Bonner  Date:  2019  Contact Type:  Face to Face    SUBJECTIVE:  Patient Findings     Positives:   Change in medications (gabapentin, macrobid)        Clinical Outcomes     Negatives:   Major bleeding event, Thromboembolic event, Anticoagulation-related hospital admission, Anticoagulation-related ED visit, Anticoagulation-related fatality           OBJECTIVE    INR   Date Value Ref Range Status   2019 1.30 (H) 0.86 - 1.14 Final       ASSESSMENT / PLAN  INR assessment SUB    Recheck INR In: 3 DAYS    INR Location Clinic      Anticoagulation Summary  As of 2019    INR goal:   2.0-3.0   TTR:   0.0 % (1.1 wk)   INR used for dosin.30! (2019)   Warfarin maintenance plan:   No maintenance plan   Full warfarin instructions:   : 8 mg; : 6 mg; : 4 mg; Otherwise No maintenance plan   Next INR check:   2019   Target end date:            Anticoagulation Episode Summary     INR check location:   Anticoagulation Clinic    Preferred lab:       Send INR reminders to:   [facility]    Comments:         Anticoagulation Care Providers     Provider Role Specialty Phone number    Crissy Madrigal PA-C Referring Family Practice [phone]            See the Encounter Report to view Anticoagulation Flowsheet and Dosing Calendar (Go to Encounters tab in chart review, and find the Anticoagulation Therapy Visit)    Dosage adjustment made based on physician directed care plan.Increased by 20% with a recheck in 3 days.    Patient states negative for signs and symptoms of bleeding or blood clots, changes in medication, changes in diet, any signs of infection or antibiotic use, anything new OTC or herbal medications, any missed or extra doses of the warfarin.    Patient informed of the symptoms to be seen for either by INR nurse or ER.      Kait Manriquez RN

## 2019-06-19 ENCOUNTER — TELEPHONE (OUTPATIENT)
Dept: FAMILY MEDICINE | Facility: CLINIC | Age: 29
End: 2019-06-19

## 2019-06-20 ENCOUNTER — ANTICOAGULATION THERAPY VISIT (OUTPATIENT)
Dept: NURSING | Facility: CLINIC | Age: 29
End: 2019-06-20

## 2019-06-20 ENCOUNTER — OFFICE VISIT (OUTPATIENT)
Dept: FAMILY MEDICINE | Facility: CLINIC | Age: 29
End: 2019-06-20
Payer: COMMERCIAL

## 2019-06-20 ENCOUNTER — TELEPHONE (OUTPATIENT)
Dept: FAMILY MEDICINE | Facility: CLINIC | Age: 29
End: 2019-06-20

## 2019-06-20 VITALS
RESPIRATION RATE: 20 BRPM | OXYGEN SATURATION: 100 % | WEIGHT: 255 LBS | HEIGHT: 66 IN | BODY MASS INDEX: 40.98 KG/M2 | TEMPERATURE: 98 F | HEART RATE: 74 BPM | SYSTOLIC BLOOD PRESSURE: 130 MMHG | DIASTOLIC BLOOD PRESSURE: 84 MMHG

## 2019-06-20 DIAGNOSIS — R32 URINARY INCONTINENCE, UNSPECIFIED TYPE: Primary | ICD-10-CM

## 2019-06-20 DIAGNOSIS — F43.20 ADJUSTMENT DISORDER, UNSPECIFIED TYPE: ICD-10-CM

## 2019-06-20 DIAGNOSIS — R41.89 COGNITIVE CHANGES: ICD-10-CM

## 2019-06-20 DIAGNOSIS — R51.9 ACUTE INTRACTABLE HEADACHE, UNSPECIFIED HEADACHE TYPE: ICD-10-CM

## 2019-06-20 DIAGNOSIS — I26.99 PULMONARY EMBOLISM WITH INFARCTION (H): ICD-10-CM

## 2019-06-20 DIAGNOSIS — E87.6 HYPOKALEMIA: ICD-10-CM

## 2019-06-20 DIAGNOSIS — N30.00 ACUTE CYSTITIS WITHOUT HEMATURIA: ICD-10-CM

## 2019-06-20 DIAGNOSIS — R11.0 NAUSEA: ICD-10-CM

## 2019-06-20 LAB
ALBUMIN SERPL-MCNC: 2.6 G/DL (ref 3.4–5)
ALBUMIN UR-MCNC: ABNORMAL MG/DL
ALP SERPL-CCNC: 145 U/L (ref 40–150)
ALT SERPL W P-5'-P-CCNC: 16 U/L (ref 0–50)
ANION GAP SERPL CALCULATED.3IONS-SCNC: 8 MMOL/L (ref 3–14)
APPEARANCE UR: CLEAR
AST SERPL W P-5'-P-CCNC: 19 U/L (ref 0–45)
BACTERIA #/AREA URNS HPF: ABNORMAL /HPF
BILIRUB SERPL-MCNC: 0.3 MG/DL (ref 0.2–1.3)
BILIRUB UR QL STRIP: NEGATIVE
BUN SERPL-MCNC: 4 MG/DL (ref 7–30)
CALCIUM SERPL-MCNC: 8.7 MG/DL (ref 8.5–10.1)
CHLORIDE SERPL-SCNC: 109 MMOL/L (ref 94–109)
CO2 SERPL-SCNC: 25 MMOL/L (ref 20–32)
COLOR UR AUTO: YELLOW
CREAT SERPL-MCNC: 0.78 MG/DL (ref 0.52–1.04)
GFR SERPL CREATININE-BSD FRML MDRD: >90 ML/MIN/{1.73_M2}
GLUCOSE SERPL-MCNC: 91 MG/DL (ref 70–99)
GLUCOSE UR STRIP-MCNC: NEGATIVE MG/DL
HGB UR QL STRIP: NEGATIVE
HYALINE CASTS #/AREA URNS LPF: ABNORMAL /LPF
INR PPP: 1.47 (ref 0.86–1.14)
KETONES UR STRIP-MCNC: NEGATIVE MG/DL
LEUKOCYTE ESTERASE UR QL STRIP: ABNORMAL
LIPASE SERPL-CCNC: 103 U/L (ref 73–393)
NITRATE UR QL: NEGATIVE
NON-SQ EPI CELLS #/AREA URNS LPF: ABNORMAL /LPF
PH UR STRIP: 6.5 PH (ref 5–7)
POTASSIUM SERPL-SCNC: 3.2 MMOL/L (ref 3.4–5.3)
PROT SERPL-MCNC: 6.9 G/DL (ref 6.8–8.8)
RBC #/AREA URNS AUTO: ABNORMAL /HPF
SODIUM SERPL-SCNC: 142 MMOL/L (ref 133–144)
SOURCE: ABNORMAL
SP GR UR STRIP: 1.01 (ref 1–1.03)
UROBILINOGEN UR STRIP-ACNC: 1 EU/DL (ref 0.2–1)
WBC #/AREA URNS AUTO: ABNORMAL /HPF

## 2019-06-20 PROCEDURE — 87086 URINE CULTURE/COLONY COUNT: CPT | Performed by: PHYSICIAN ASSISTANT

## 2019-06-20 PROCEDURE — 81001 URINALYSIS AUTO W/SCOPE: CPT | Performed by: PHYSICIAN ASSISTANT

## 2019-06-20 PROCEDURE — 99214 OFFICE O/P EST MOD 30 MIN: CPT | Performed by: PHYSICIAN ASSISTANT

## 2019-06-20 PROCEDURE — 83690 ASSAY OF LIPASE: CPT | Performed by: PHYSICIAN ASSISTANT

## 2019-06-20 PROCEDURE — 85025 COMPLETE CBC W/AUTO DIFF WBC: CPT | Performed by: PHYSICIAN ASSISTANT

## 2019-06-20 PROCEDURE — 85610 PROTHROMBIN TIME: CPT | Performed by: PHYSICIAN ASSISTANT

## 2019-06-20 PROCEDURE — 80053 COMPREHEN METABOLIC PANEL: CPT | Performed by: PHYSICIAN ASSISTANT

## 2019-06-20 PROCEDURE — 36415 COLL VENOUS BLD VENIPUNCTURE: CPT | Performed by: PHYSICIAN ASSISTANT

## 2019-06-20 RX ORDER — GABAPENTIN 300 MG/1
300 CAPSULE ORAL
Qty: 90 CAPSULE | Refills: 1 | Status: SHIPPED | OUTPATIENT
Start: 2019-06-20 | End: 2019-09-25

## 2019-06-20 RX ORDER — CIPROFLOXACIN 250 MG/1
250 TABLET, FILM COATED ORAL 2 TIMES DAILY
Qty: 14 TABLET | Refills: 0 | Status: SHIPPED | OUTPATIENT
Start: 2019-06-20 | End: 2019-07-31

## 2019-06-20 ASSESSMENT — PAIN SCALES - GENERAL: PAINLEVEL: SEVERE PAIN (6)

## 2019-06-20 ASSESSMENT — MIFFLIN-ST. JEOR: SCORE: 1903.42

## 2019-06-20 NOTE — PROGRESS NOTES
ANTICOAGULATION FOLLOW-UP CLINIC VISIT    Patient Name:  Hilaria Bonner  Date:  6/20/2019  Contact Type:  Telephone/ Hilaria    SUBJECTIVE:  Patient Findings     Comments:   INR result is still in process at this time. Writer huddled with Crissy Madrigal to determine warfarin dosing for patient for this evening, until result is in tomorrow. Please see telephone encounter from June 20, 2019 for further information.     Kierra Dominguez RN, BSN          Clinical Outcomes     Negatives:   Major bleeding event, Thromboembolic event, Anticoagulation-related hospital admission, Anticoagulation-related ED visit, Anticoagulation-related fatality    Comments:   INR result is still in process at this time. Writer huddled with Crissy Madrigal to determine warfarin dosing for patient for this evening, until result is in tomorrow. Please see telephone encounter from June 20, 2019 for further information.     Kierra Dominguez RN, BSN             OBJECTIVE    INR   Date Value Ref Range Status   06/17/2019 1.30 (H) 0.86 - 1.14 Final       ASSESSMENT / PLAN  INR assessment  No INR, still in process at close of clinic. Will call tomorrow when resulted.   Recheck INR In:  No INR, still in process at close of clinic. Will call tomorrow when resulted.   INR Location  No INR, still in process at close of clinic. Will call tomorrow when resulted.     Anticoagulation Summary  As of 6/20/2019    INR goal:   2.0-3.0   TTR:   0.0 % (1.1 wk)   INR used for dosing:      Warfarin maintenance plan:   No maintenance plan   Full warfarin instructions:   6/20: 4 mg; Otherwise No maintenance plan   Next INR check:   6/21/2019   Target end date:            Anticoagulation Episode Summary     INR check location:   Anticoagulation Clinic    Preferred lab:       Send INR reminders to:   [facility]    Comments:         Anticoagulation Care Providers     Provider Role Specialty Phone number    Crissy Madrigal PA-C Referring Family  Practice [phone]            See the Encounter Report to view Anticoagulation Flowsheet and Dosing Calendar (Go to Encounters tab in chart review, and find the Anticoagulation Therapy Visit)    Dosage adjustment made based on physician directed care plan.    Patient's INR result has not resulted yet - still in process. Due to this, writer huddled with Crissy Madrigal to discuss what dose of warfarin patient should take tonight, until INR result can be reviewed tomorrow. Writer reviewed dosing calender, and provider and writer agreed for patient to take 4 mg of warfarin and her Lovenox injection this evening until she can be advised further on warfarin dosing when her INR is resulted tomorrow.      Writer called and spoke with patient and advised on warfarin dosing for tonight, to continue Lovenox, and that INR nurse would contact her tomorrow to further advise on dosing. Patient states understanding and denies further questions or concerns.      Routing to [facility] to please call patient when INR result is in tomorrow.     Kierra Dominguez RN, BSN

## 2019-06-20 NOTE — PATIENT INSTRUCTIONS
Schedule appointment with vascular surgeon as recommended at emergency department visit- not sure when since emergency department note not complete  Schedule appointment with neuropsychologist at   Take cipro 250 mg twice a day for 7 days.  Return urgently if any change in symptoms like abdominal pain, fever, vomiting or other change in symptoms .  Follow up with us in clinic in approximately one month.  Sooner if questions or concerns

## 2019-06-20 NOTE — TELEPHONE ENCOUNTER
Patient's INR result has not resulted yet - still in process. Due to this, writer huddled with Crissy Madrigal to discuss what dose of warfarin patient should take tonight, until INR result can be reviewed tomorrow. Writer reviewed dosing calender, and provider and writer agreed for patient to take 4 mg of warfarin and her Lovenox injection this evening until she can be advised further on warfarin dosing when her INR is resulted tomorrow.     Writer called and spoke with patient and advised on warfarin dosing for tonight, to continue Lovenox, and that INR nurse would contact her tomorrow to further advise on dosing. Patient states understanding and denies further questions or concerns.     Routing to [facility] to please call patient when INR result is in tomorrow.    Kierra Dominguez RN, BSN

## 2019-06-20 NOTE — PROGRESS NOTES
Subjective     Hilaria Bonner is a 28 year old female who presents to clinic today for the following health issues:    HPI   ED/UC Followup:    Facility:  [facility]  Date of visit: 6/14/19  Reason for visit: Aneurysm found on imaging  Current Status: stable       Headache  Onset: 1 week    Description:   Location: bilateral in the frontal area   Character: constant pain  Frequency:  1-2 days a week  Duration:  Constant all day    Intensity: 6-10/10    Progression of Symptoms:  improving    Accompanying Signs & Symptoms:  Stiff neck: no  Neck or upper back pain: no  Fever: no  Sinus pressure: no  Nausea or vomiting: no  Dizziness: no  Numbness: YES  Weakness: YES  Visual changes: no    History:   Head trauma: YES  Family history of migraines: YES- Maternal Aunts  Previous tests for headaches: YES- CT  Neurologist evaluations: no  Able to do daily activities: no  Wake with a headaches: YES  Do headaches wake you up: no  Daily pain medication use: YES- gabapentin  Work/school stressors/changes: YES- health     Precipitating factors:   Does light make it worse: no  Does sound make it worse: YES    Alleviating factors:  Does sleep help: YES    Therapies Tried and outcome: gabpanentin      Headache improving.  Take some time for med to kick in. Seen last week and prescribed gabapentin for headache Only had one or two headache since then.  Last week had ultrasound of legs and found aneurysm and sent to emergency department and vascular surgeon and no need for emergency surgery- needs to come back and see her  Having issues with urinary incontinence for a few days  Had Accident of urine   Has one pill left of antibiotic left diagnosed with urinary tract infection last week- treated with macrobid  Talked to director of nursing - karishma- because home care nurse initially set up has yet to meet her.   Sent two different people to do INR and not scheduling follow up with her   Reports home care nurse doesn't set  schedule up and never texted back.  texted again around 3 so had to call Monica and she tried to contact them too but noone ever contacted me.  Due for INR recheck  Someone new will be coming out Monday   No greens.  Reports that she is taking coumadin as prescribed. Last 3 days nauseated and weak- new.  Yesterday mucus color threw up once   Still doing lovenox    nauseated and urge to want to throw up - last 3 days  Right upper quadrant abdominal pain 3 days.  No fever.   Known to me with history of massive PE with infarction and cardiac arrest requiring 50 minutes of CPR and multiple rib fractures  Speech is improving        Patient Active Problem List   Diagnosis     Morbid obesity (H)     Vitamin D deficiency     Elevated parathyroid hormone     Encounter for smoking cessation counseling     Menorrhagia with irregular cycle     Morbid (severe) obesity due to excess calories (H)     Pulmonary embolism with infarction (H)     Past Surgical History:   Procedure Laterality Date     GYN SURGERY      2 C-sections     KNEE SURGERY  most recently - [id number]    3 surgeries in [place]     LAPAROSCOPIC GASTRIC SLEEVE N/A 3/26/2019    Procedure: Laparoscopic Sleeve Gastrectomy;  Surgeon: Luan Lopez MD;  Location: [facility] OR     ORTHOPEDIC SURGERY      2 knee meniscus surgery       Social History     Tobacco Use     Smoking status: Former Smoker     Years: 1.00     Start date: 2016     Last attempt to quit: 2018     Years since quittin.4     Smokeless tobacco: Never Used   Substance Use Topics     Alcohol use: Not Currently     Alcohol/week: 0.0 oz     Comment: occasional     Family History   Problem Relation Age of Onset     Diabetes Father      Hypertension Father      Breast Cancer Sister 26        surgery only     Cancer Sister      Coronary Artery Disease Other         paternal aunt     Thyroid Disease Other         neice     Coronary Artery Disease Other      Cerebrovascular Disease Other      Breast  Cancer Sister      Thyroid Disease Other      Cerebrovascular Disease No family hx of          Current Outpatient Medications   Medication Sig Dispense Refill     acetaminophen (TYLENOL) 325 MG tablet Take 2 tablets (650 mg) by mouth every 4 hours as needed for other (For optimal non-opioid multimodal pain management to improve pain control and physical function.) 100 tablet 0     B Complex Vitamins (VITAMIN-B COMPLEX) TABS Take 1 tablet by mouth       Calcium Citrate-Vitamin D 500-500 MG-UNIT CHEW Take 1 chew tab by mouth 2 times daily 180 tablet 3     CHANTIX STARTING MONTH AMADO 0.5 MG X 11 & 1 MG X 42 tablet        childrens multivitamin w/iron (FLINTSTONES COMPLETE) 60 MG chewable tablet Take 1 chew tab by mouth daily              cyanocobalamin (VITAMIN B-12) 1000 MCG tablet Take 1,000 mcg by mouth       ferrous sulfate (FEROSUL) 325 (65 Fe) MG tablet Take 325 mg by mouth       fluticasone (FLONASE) 50 MCG/ACT nasal spray 2 sprays       folic acid (FOLVITE) 1 MG tablet        gabapentin (NEURONTIN) 300 MG capsule Take 1 capsule (300 mg) by mouth nightly as needed 90 capsule 1     magic mouthwash (FIRST-MOUTHWASH BLM) compounding kit Take 30 mLs by mouth every 6 hours as needed for mouth sores 900 mL 2     Multiple Vitamins-Minerals (MULTIVITAMIN ADULTS PO) Take 1 tablet by mouth daily       nitroFURantoin macrocrystal-monohydrate (MACROBID) 100 MG capsule Take 1 capsule (100 mg) by mouth 2 times daily 14 capsule 0     omeprazole (PRILOSEC) 20 MG DR capsule Take 1 capsule (20 mg) by mouth 2 times daily 180 capsule 1     ondansetron (ZOFRAN-ODT) 4 MG ODT tab Take 1 tablet (4 mg) by mouth every 6 hours as needed for nausea or vomiting 12 tablet 1     topiramate (TOPAMAX) 25 MG tablet Take 3 tablets (75 mg) by mouth daily 90 tablet 3     ursodiol (ACTIGALL) 300 MG capsule Take 1 capsule (300 mg) by mouth 2 times daily 60 capsule 4     vitamin  s/Minerals TABS Take 1 tablet by mouth       vitamin B complex with  "vitamin C (VITAMIN  B COMPLEX) tablet Take 1 tablet by mouth daily 90 tablet 1     vitamin C (ASCORBIC ACID) 1000 MG TABS Take 1,000 mg by mouth       vitamin D3 (CHOLECALCIFEROL) 2000 units tablet Take 1 tablet by mouth daily 90 tablet 1     Cyanocobalamin (B-12) 500 MCG SUBL Place 1 tablet under the tongue daily (Patient not taking: Reported on 6/20/2019) 90 tablet 3     enoxaparin (LOVENOX) 120 MG/0.8ML syringe Inject 0.8 mLs (120 mg) Subcutaneous every 12 hours 8 Syringe 1     potassium chloride ER (K-DUR/KLOR-CON M) 10 MEQ CR tablet Take 2 tablets (20 mEq) by mouth daily 60 tablet 0     warfarin (COUMADIN) 2 MG tablet Take 2 tablets by mouth daily or as directed by [facility] nurse. 90 tablet 0     BP Readings from Last 3 Encounters:   06/20/19 130/84   06/14/19 (!) 140/97   06/13/19 118/82    Wt Readings from Last 3 Encounters:   06/20/19 115.7 kg (255 lb)   06/14/19 118 kg (260 lb 3.2 oz)   06/13/19 117 kg (258 lb)                      Reviewed and updated as needed this visit by Provider  Tobacco  Allergies  Meds  Problems  Med Hx  Surg Hx  Fam Hx  Soc Hx          Review of Systems   ROS COMP: Constitutional, HEENT, cardiovascular, pulmonary, gi and gu systems are negative, except as otherwise noted.      Objective    /84 (BP Location: Right arm, Patient Position: Chair, Cuff Size: Adult Large)   Pulse 74   Temp 98  F (36.7  C) (Oral)   Resp 20   Ht 1.676 m (5' 6\")   Wt 115.7 kg (255 lb)   LMP  (Exact Date)   SpO2 100%   Breastfeeding? No   BMI 41.16 kg/m    Body mass index is 41.16 kg/m .  Physical Exam   GENERAL: alert, no distress and obese  NECK: no adenopathy, no asymmetry, masses, or scars and thyroid normal to palpation  RESP: lungs clear to auscultation - no rales, rhonchi or wheezes  CV: regular rate and rhythm, normal S1 S2, no S3 or S4, no murmur, click or rub, no peripheral edema and peripheral pulses strong  ABDOMEN: tenderness epigastric, RUQ and LUQ and bowel sounds normal  MS: " no gross musculoskeletal defects noted, no edema    Diagnostic Test Results:  Results for orders placed or performed in visit on 06/20/19   *UA reflex to Microscopic and Culture ([place] and [facility] (except [place] and [place])   Result Value Ref Range    Color Urine Yellow     Appearance Urine Clear     Glucose Urine Negative NEG^Negative mg/dL    Bilirubin Urine Negative NEG^Negative    Ketones Urine Negative NEG^Negative mg/dL    Specific Gravity Urine 1.010 1.003 - 1.035    Blood Urine Negative NEG^Negative    pH Urine 6.5 5.0 - 7.0 pH    Protein Albumin Urine Trace (A) NEG^Negative mg/dL    Urobilinogen Urine 1.0 0.2 - 1.0 EU/dL    Nitrite Urine Negative NEG^Negative    Leukocyte Esterase Urine Small (A) NEG^Negative    Source Urine    CBC with platelets differential   Result Value Ref Range    WBC 4.9 4.0 - 11.0 10e9/L    RBC Count 3.25 (L) 3.8 - 5.2 10e12/L    Hemoglobin 10.8 (L) 11.7 - 15.7 g/dL    Hematocrit 33.3 (L) 35.0 - 47.0 %    MCV PENDING 78 - 100 fl    MCH 33.2 (H) 26.5 - 33.0 pg    MCHC 32.4 31.5 - 36.5 g/dL    RDW 15.1 (H) 10.0 - 15.0 %    Platelet Count 384 150 - 450 10e9/L    % Neutrophils 55.8 %    % Lymphocytes 31.8 %    % Monocytes 9.3 %    % Eosinophils 2.5 %    % Basophils 0.6 %    Absolute Neutrophil 2.7 1.6 - 8.3 10e9/L    Absolute Lymphocytes 1.5 0.8 - 5.3 10e9/L    Absolute Monocytes 0.5 0.0 - 1.3 10e9/L    Absolute Eosinophils 0.1 0.0 - 0.7 10e9/L    Absolute Basophils 0.0 0.0 - 0.2 10e9/L    Diff Method Automated Method    Comprehensive metabolic panel   Result Value Ref Range    Sodium 142 133 - 144 mmol/L    Potassium 3.2 (L) 3.4 - 5.3 mmol/L    Chloride 109 94 - 109 mmol/L    Carbon Dioxide 25 20 - 32 mmol/L    Anion Gap 8 3 - 14 mmol/L    Glucose 91 70 - 99 mg/dL    Urea Nitrogen 4 (L) 7 - 30 mg/dL    Creatinine 0.78 0.52 - 1.04 mg/dL    GFR Estimate >90 >60 mL/min/[1.73_m2]    GFR Estimate If Black >90 >60 mL/min/[1.73_m2]    Calcium 8.7 8.5 - 10.1 mg/dL    Bilirubin  Total 0.3 0.2 - 1.3 mg/dL    Albumin 2.6 (L) 3.4 - 5.0 g/dL    Protein Total 6.9 6.8 - 8.8 g/dL    Alkaline Phosphatase 145 40 - 150 U/L    ALT 16 0 - 50 U/L    AST 19 0 - 45 U/L   Lipase   Result Value Ref Range    Lipase 103 73 - 393 U/L   Urine Microscopic   Result Value Ref Range    WBC Urine 10-25 (A) OTO5^0 - 5 /HPF    RBC Urine O - 2 OTO2^O - 2 /HPF    Hyaline Casts O - 2 OTO2^O - 2 /LPF    Squamous Epithelial /LPF Urine Moderate (A) FEW^Few /LPF    Bacteria Urine Few (A) NEG^Negative /HPF   INR   Result Value Ref Range    INR 1.47 (H) 0.86 - 1.14   Urine Culture Aerobic Bacterial   Result Value Ref Range    Specimen Description Urine     Culture Micro       >100,000 colonies/mL  mixed urogenital libby  Susceptibility testing not routinely done             Assessment & Plan     1. Urinary incontinence, unspecified type  Still looks infected.  Treated with cipro   - *UA reflex to Microscopic and Culture ([place] and [facility] (except [place] and [place])  - Comprehensive metabolic panel  - Urine Culture Aerobic Bacterial    2. Nausea  Still a bit anemic but improving.  Normal pancreas function.  Hypokalemic.  Normal liver function.  If still symptomatic consider ultrasound to look at gallbladder   - CBC with platelets differential  - Comprehensive metabolic panel  - Lipase    3. Pulmonary embolism with infarction (H)  Recheck inr today   - Urine Microscopic  - INR    4. Adjustment disorder, unspecified type  Currently living with mother-referred for neuropsych testing   - NEUROPSYCHOLOGY REFERRAL    5. Cognitive changes   I have some concerns about hypoxic injury given amount of time CPR required   - NEUROPSYCHOLOGY REFERRAL    6. Acute intractable headache, unspecified headache type  Continue gabapentin- headache improved   - gabapentin (NEURONTIN) 300 MG capsule; Take 1 capsule (300 mg) by mouth nightly as needed  Dispense: 90 capsule; Refill: 1    7. Acute cystitis without hematuria   as above   -  "ciprofloxacin (CIPRO) 250 MG tablet; Take 1 tablet (250 mg) by mouth 2 times daily  Dispense: 14 tablet; Refill: 0    8. Hypokalemia  Will treat with potassium and recheck basic metabolic panel next week   - Basic metabolic panel; Future     BMI:   Estimated body mass index is 41.16 kg/m  as calculated from the following:    Height as of this encounter: 1.676 m (5' 6\").    Weight as of this encounter: 115.7 kg (255 lb).   Weight management plan: pt has gastric sleeve        Patient Instructions   Schedule appointment with vascular surgeon as recommended at emergency department visit- not sure when since emergency department note not complete  Schedule appointment with neuropsychologist at   Take cipro 250 mg twice a day for 7 days.  Return urgently if any change in symptoms like abdominal pain, fever, vomiting or other change in symptoms .  Follow up with us in clinic in approximately one month.  Sooner if questions or concerns         Return in about 4 weeks (around 7/18/2019), or if symptoms worsen or fail to improve, for Routine Visit.    Crissy Madrigal PA-C  [facility]      "

## 2019-06-20 NOTE — RESULT ENCOUNTER NOTE
Aleksey Manrique  As we reviewed in clinic your urine still looks infected.   Please call or MyChart my office with any questions or concerns.    Crissy Madrigal, PAC

## 2019-06-21 ENCOUNTER — TELEPHONE (OUTPATIENT)
Dept: FAMILY MEDICINE | Facility: CLINIC | Age: 29
End: 2019-06-21

## 2019-06-21 ENCOUNTER — ANTICOAGULATION THERAPY VISIT (OUTPATIENT)
Dept: NURSING | Facility: CLINIC | Age: 29
End: 2019-06-21
Payer: COMMERCIAL

## 2019-06-21 ENCOUNTER — PATIENT OUTREACH (OUTPATIENT)
Dept: CARE COORDINATION | Facility: CLINIC | Age: 29
End: 2019-06-21

## 2019-06-21 DIAGNOSIS — E87.6 HYPOKALEMIA: Primary | ICD-10-CM

## 2019-06-21 DIAGNOSIS — I26.99 ACUTE MASSIVE PULMONARY EMBOLISM (H): ICD-10-CM

## 2019-06-21 DIAGNOSIS — I26.99 PULMONARY EMBOLISM WITH INFARCTION (H): ICD-10-CM

## 2019-06-21 LAB
BACTERIA SPEC CULT: NORMAL
SPECIMEN SOURCE: NORMAL

## 2019-06-21 PROCEDURE — 99207 ZZC NO CHARGE NURSE ONLY: CPT | Performed by: PHYSICIAN ASSISTANT

## 2019-06-21 RX ORDER — POTASSIUM CHLORIDE 750 MG/1
20 TABLET, EXTENDED RELEASE ORAL DAILY
Qty: 60 TABLET | Refills: 0 | Status: SHIPPED | OUTPATIENT
Start: 2019-06-21 | End: 2019-06-27

## 2019-06-21 RX ORDER — WARFARIN SODIUM 2 MG/1
TABLET ORAL
Qty: 90 TABLET | Refills: 0 | Status: SHIPPED | OUTPATIENT
Start: 2019-06-21 | End: 2019-07-08

## 2019-06-21 ASSESSMENT — ACTIVITIES OF DAILY LIVING (ADL): DEPENDENT_IADLS:: INDEPENDENT

## 2019-06-21 NOTE — TELEPHONE ENCOUNTER
Please see the June 21, 2019 Anticoagulation encounter for further information.      Ayana Tay RN, BSN, PHN, [facility]

## 2019-06-21 NOTE — PROGRESS NOTES
ANTICOAGULATION FOLLOW-UP CLINIC VISIT    Patient Name:  Hilaria Bonner  Date:  2019  Contact Type:  Telephone    SUBJECTIVE:  Patient Findings         Clinical Outcomes     Negatives:   Major bleeding event, Thromboembolic event, Anticoagulation-related hospital admission, Anticoagulation-related ED visit, Anticoagulation-related fatality           OBJECTIVE    INR   Date Value Ref Range Status   2019 1.47 (H) 0.86 - 1.14 Final       ASSESSMENT / PLAN  INR assessment SUB    Recheck INR In: 3 DAYS    INR Location Homecare INR      Anticoagulation Summary  As of 2019    INR goal:   2.0-3.0   TTR:   0.0 % (1.6 wk)   INR used for dosin.47! (2019)   Warfarin maintenance plan:   No maintenance plan   Full warfarin instructions:   : 8 mg; : 4 mg; : 4 mg; Otherwise No maintenance plan   Next INR check:   2019   Target end date:            Anticoagulation Episode Summary     INR check location:   Anticoagulation Clinic    Preferred lab:       Send INR reminders to:   BELIA BRADEN    Comments:         Anticoagulation Care Providers     Provider Role Specialty Phone number    Crissy Madrigal PA-C Referring [facility] [phone]            See the Encounter Report to view Anticoagulation Flowsheet and Dosing Calendar (Go to Encounters tab in chart review, and find the Anticoagulation Therapy Visit)    Dosage adjustment made based on physician directed care plan. Refilled Lovenox and warfarin as patient is still subtherapeutic and will continue to bridge. Increased dosage by 11%. Patient states home care will be out 19 for INR draw.       Ayana Tay RN, BSN, PHN

## 2019-06-21 NOTE — TELEPHONE ENCOUNTER
Called patient with the results on potassium and and other lab test. Patient notes the need for refill of lovenox and warfarin. Informed that when Ayana calls to adjust for INR to ask and she will fill them.    Patient will call back and schedule recheck on potassium later when she knows about rechecking INR.    Kait Manriquez RN, [facility]

## 2019-06-21 NOTE — PROGRESS NOTES
Clinic Care Coordination Contact  Care Team Conversations    Patient has voiced concerns to PCP and clinic care coordinator, with home care drawing her INR's. Home care has voiced concerns with being able to reach patient to draw INR.     Discussed concerns with PCP. Recommend the only alternative is for patient to have INR drawn at [facility] by INR nurses. Then patient can immediately get dosing and schdeule next appointment so she does not have be waiting for phone calls.     Clinic care coordinator called and left detailed message on patient's voicemail with instructions to schedule INR at [facility] [phone] for Monday, 6/24/19.      Clinic care coordinator will follow up in am on 6/24/19 to determine patient compliance.     Monica Hernandez RN, [facility] - Primary Care Clinic RN Coordinator  [facility]   6/21/2019    3:18 PM  [phone]

## 2019-06-21 NOTE — TELEPHONE ENCOUNTER
Notes recorded by Crissy Madrigal PA-C on 6/21/2019 at 7:47 AM CDT  Please call with lab results     Potassium was low at 3.2 -start potassium supplement 20 meq daily and recheck potassium in one week  Liver function and kidney function normal.   Pancreas function normal.   INR still subtherapeutic. - RN to adjust coumadin    Hello Hilaria  Your potassium is low and we need to start potassium 20 meq daily and recheck labs in one week.  Liver function and kidney function were normal.   Your pancreas function was normal.   Your INR is still subtherapeutic.  Please review with RN.   Please call or MyChart my office with any questions or concerns.    Crissy Madrigal, PAC      INR nurse still has to call about INR and will give this information also.  Kait Manirquez RN, [facility]

## 2019-06-21 NOTE — RESULT ENCOUNTER NOTE
Please call with lab results     Potassium was low at 3.2 -start potassium supplement 20 meq daily and recheck potassium in one week  Liver function and kidney function normal.   Pancreas function normal.   INR still subtherapeutic. - RN to adjust coumadin    Hello Hilaria  Your potassium is low and we need to start potassium 20 meq daily and recheck labs in one week.  Liver function and kidney function were normal.   Your pancreas function was normal.   Your INR is still subtherapeutic.  Please review with RN.   Please call or MyChart my office with any questions or concerns.    Crissy Madrigal, PAC

## 2019-06-24 ENCOUNTER — ANTICOAGULATION THERAPY VISIT (OUTPATIENT)
Dept: NURSING | Facility: CLINIC | Age: 29
End: 2019-06-24
Payer: COMMERCIAL

## 2019-06-24 ENCOUNTER — TELEPHONE (OUTPATIENT)
Dept: FAMILY MEDICINE | Facility: CLINIC | Age: 29
End: 2019-06-24

## 2019-06-24 ENCOUNTER — PATIENT OUTREACH (OUTPATIENT)
Dept: CARE COORDINATION | Facility: CLINIC | Age: 29
End: 2019-06-24

## 2019-06-24 LAB
BASOPHILS # BLD AUTO: 0 10E9/L (ref 0–0.2)
BASOPHILS NFR BLD AUTO: 0.6 %
DIFFERENTIAL METHOD BLD: ABNORMAL
EOSINOPHIL # BLD AUTO: 0.1 10E9/L (ref 0–0.7)
EOSINOPHIL NFR BLD AUTO: 2.5 %
ERYTHROCYTE [DISTWIDTH] IN BLOOD BY AUTOMATED COUNT: 15.1 % (ref 10–15)
HCT VFR BLD AUTO: 33.3 % (ref 35–47)
HGB BLD-MCNC: 10.8 G/DL (ref 11.7–15.7)
INR PPP: 1.3 (ref 0.9–1.1)
LYMPHOCYTES # BLD AUTO: 1.5 10E9/L (ref 0.8–5.3)
LYMPHOCYTES NFR BLD AUTO: 31.8 %
MCH RBC QN AUTO: 33.2 PG (ref 26.5–33)
MCHC RBC AUTO-ENTMCNC: 32.4 G/DL (ref 31.5–36.5)
MCV RBC AUTO: 103 FL (ref 78–100)
MONOCYTES # BLD AUTO: 0.5 10E9/L (ref 0–1.3)
MONOCYTES NFR BLD AUTO: 9.3 %
NEUTROPHILS # BLD AUTO: 2.7 10E9/L (ref 1.6–8.3)
NEUTROPHILS NFR BLD AUTO: 55.8 %
PLATELET # BLD AUTO: 384 10E9/L (ref 150–450)
RBC # BLD AUTO: 3.25 10E12/L (ref 3.8–5.2)
WBC # BLD AUTO: 4.9 10E9/L (ref 4–11)

## 2019-06-24 PROCEDURE — 99207 ZZC NO CHARGE NURSE ONLY: CPT | Performed by: PHYSICIAN ASSISTANT

## 2019-06-24 ASSESSMENT — ACTIVITIES OF DAILY LIVING (ADL): DEPENDENT_IADLS:: INDEPENDENT

## 2019-06-24 NOTE — TELEPHONE ENCOUNTER
Please see anticoagulation encounter from June 24, 2019 for further information.    Kierra Cottrell RN, BSN

## 2019-06-24 NOTE — PROGRESS NOTES
ANTICOAGULATION FOLLOW-UP CLINIC VISIT    Patient Name:  Hilaria Bonner  Date:  2019  Contact Type:  Telephone/ Tyrone CAAL    SUBJECTIVE:  Patient Findings         Clinical Outcomes     Negatives:   Major bleeding event, Thromboembolic event, Anticoagulation-related hospital admission, Anticoagulation-related ED visit, Anticoagulation-related fatality           OBJECTIVE    INR   Date Value Ref Range Status   2019 1.3 (A) 0.9 - 1.1 Final       ASSESSMENT / PLAN  INR assessment SUB    Recheck INR In: 3 DAYS    INR Location Clinic      Anticoagulation Summary  As of 2019    INR goal:   2.0-3.0   TTR:   0.0 % (2.1 wk)   INR used for dosin.3! (2019)   Warfarin maintenance plan:   No maintenance plan   Full warfarin instructions:   : 10 mg; : 8 mg; : 4 mg; Otherwise No maintenance plan   Next INR check:   2019   Target end date:            Anticoagulation Episode Summary     INR check location:   Anticoagulation Clinic    Preferred lab:       Send INR reminders to:   BELIA BRADEN    Comments:         Anticoagulation Care Providers     Provider Role Specialty Phone number    Crissy Madrigal PA-C Referring [facility] [phone]            See the Encounter Report to view Anticoagulation Flowsheet and Dosing Calendar (Go to Encounters tab in chart review, and find the Anticoagulation Therapy Visit)    Dosage adjustment made based on physician directed care plan. Patient is still sub-therapeutic She has been taking warfarin and Lovenox injections q 12 hours as directed. Weekly warfarin doing increased by about 10% and patient to continue her Lovenox injections.     Denies any changes to medications or other diet issues. Denies any missed doses or extra doses. Denies bleeding, bruising, or blood clots. Verbalizes understanding of dosing and recheck date.   Patient is aware if signs of clotting (pain, tenderness, swelling, color change in leg or  arm, SOB) and bleeding occur (blood in stool, urine, large bruising, bleeding gums, nosebleeds) to have INR check sooner. If sx severe report to ER or concerned for stroke call 911. If general questions or concerns arise, call clinic.     Patient states understanding and wrote down orders.     Home care contacted again and reviewed warfarin dosing and Lovenox orders. Advised for home care to recheck INR In 3 days.    Kierra Cottrell RN, BSN

## 2019-06-24 NOTE — TELEPHONE ENCOUNTER
Reason for Call:  INR    Who is calling?  Home Care:     Phone number:  [phone]    Fax number:  none    Name of caller: Moni    INR Value:  1.3    Are there any other concerns:  No    Can we leave a detailed message on this number? YES    Phone number patient can be reached at: Cell number on file:    Telephone Information:   Mobile [phone]         Call taken on 6/24/2019 at 11:33 AM by Con Fontana

## 2019-06-24 NOTE — PROGRESS NOTES
Clinic Care Coordination Contact  [facility] [[email or web address]]/Voicemail    Referral Source: PCP    Clinical Data: Care Coordinator Outreach     Left message on voicemail with call back information and requested return call.    Instructed patient that she needs to have her INR drawn today. If home care is not coming today she needs to call [facility] at [phone] and see the INR nurse for a draw.     Reinforced the importance of compliance with  INR as she is not therapeutic.     Plan: Clinic care coordinator will continue to follow.    Monica Hernandez RN, [facility] - Primary Care Clinic RN Coordinator  [facility]   6/24/2019    9:59 AM  [phone]

## 2019-06-27 ENCOUNTER — ANTICOAGULATION THERAPY VISIT (OUTPATIENT)
Dept: NURSING | Facility: CLINIC | Age: 29
End: 2019-06-27

## 2019-06-27 DIAGNOSIS — E87.6 HYPOKALEMIA: ICD-10-CM

## 2019-06-27 DIAGNOSIS — I26.99 PULMONARY EMBOLISM WITH INFARCTION (H): ICD-10-CM

## 2019-06-27 DIAGNOSIS — I26.99 PULMONARY EMBOLISM WITH INFARCTION (H): Primary | ICD-10-CM

## 2019-06-27 DIAGNOSIS — Z53.9 ERRONEOUS ENCOUNTER--DISREGARD: Primary | ICD-10-CM

## 2019-06-27 LAB
ANION GAP SERPL CALCULATED.3IONS-SCNC: 6 MMOL/L (ref 3–14)
BUN SERPL-MCNC: 3 MG/DL (ref 7–30)
CALCIUM SERPL-MCNC: 8.7 MG/DL (ref 8.5–10.1)
CHLORIDE SERPL-SCNC: 110 MMOL/L (ref 94–109)
CO2 SERPL-SCNC: 27 MMOL/L (ref 20–32)
CREAT SERPL-MCNC: 0.78 MG/DL (ref 0.52–1.04)
GFR SERPL CREATININE-BSD FRML MDRD: >90 ML/MIN/{1.73_M2}
GLUCOSE SERPL-MCNC: 93 MG/DL (ref 70–99)
INR PPP: 1.32 (ref 0.86–1.14)
POTASSIUM SERPL-SCNC: 3.9 MMOL/L (ref 3.4–5.3)
SODIUM SERPL-SCNC: 143 MMOL/L (ref 133–144)

## 2019-06-27 PROCEDURE — 85610 PROTHROMBIN TIME: CPT | Performed by: STUDENT IN AN ORGANIZED HEALTH CARE EDUCATION/TRAINING PROGRAM

## 2019-06-27 PROCEDURE — 80048 BASIC METABOLIC PNL TOTAL CA: CPT | Performed by: PHYSICIAN ASSISTANT

## 2019-06-27 PROCEDURE — 36415 COLL VENOUS BLD VENIPUNCTURE: CPT | Performed by: PHYSICIAN ASSISTANT

## 2019-06-27 RX ORDER — POTASSIUM CHLORIDE 750 MG/1
20 TABLET, EXTENDED RELEASE ORAL DAILY
Qty: 90 TABLET | Refills: 0 | Status: SHIPPED | OUTPATIENT
Start: 2019-06-27 | End: 2019-08-02

## 2019-06-27 NOTE — RESULT ENCOUNTER NOTE
Aleksey Manrique  Your potassium was normal.   Continue the potassium supplement as you have been taking. I have sent over a refill.   Please call or MyChart my office with any questions or concerns.    Crissy Madrigal, PAC

## 2019-06-28 ENCOUNTER — TELEPHONE (OUTPATIENT)
Dept: FAMILY MEDICINE | Facility: CLINIC | Age: 29
End: 2019-06-28

## 2019-06-28 NOTE — TELEPHONE ENCOUNTER
[facility] physician orders , 2 new meds, placed on Dr. Davis 's desk for completion.      Noemi BLUNT)(MANDY)

## 2019-07-01 ENCOUNTER — ANTICOAGULATION THERAPY VISIT (OUTPATIENT)
Dept: NURSING | Facility: CLINIC | Age: 29
End: 2019-07-01
Payer: COMMERCIAL

## 2019-07-01 ENCOUNTER — TELEPHONE (OUTPATIENT)
Dept: FAMILY MEDICINE | Facility: CLINIC | Age: 29
End: 2019-07-01

## 2019-07-01 ENCOUNTER — MEDICAL CORRESPONDENCE (OUTPATIENT)
Dept: HEALTH INFORMATION MANAGEMENT | Facility: CLINIC | Age: 29
End: 2019-07-01

## 2019-07-01 LAB — INR PPP: 1.2 (ref 0.9–1.1)

## 2019-07-01 NOTE — TELEPHONE ENCOUNTER
Reason for Call:  INR    Who is calling?  Home Care: [facility]    Phone number:  [phone]    Fax number:      Name of caller: Pranav    INR Value:  1.2    Are there any other concerns:  Yes: Cannot understand why her numbers will not go up. They are doing everything they can     Can we leave a detailed message on this number? YES    Phone number patient can be reached at:       Call taken on 7/1/2019 at 10:59 AM by Arron Black

## 2019-07-01 NOTE — TELEPHONE ENCOUNTER
This writer attempted to contact Pranav CAAL on 07/01/19      Reason for call dosing and left message.      If patient calls back:   INR Registered Nurse called. Inform patient that someone from the INR group will contact them, document that pt called and route to [facility] [[id number]]        Kait Manriquez, RN

## 2019-07-01 NOTE — TELEPHONE ENCOUNTER
Please see the July 1, 2019 Anticoagulation encounter for further information.  Kait Manriquez RN, [facility]

## 2019-07-01 NOTE — TELEPHONE ENCOUNTER
Reason for Call:  Other INR     Detailed comments: RN returning phone call and would like a phone call back as soon as possible regarding INR dosing    Phone Number RN  can be reached at: Other phone number:  [phone]    Best Time: anytime    Can we leave a detailed message on this number? YES    Call taken on 7/1/2019 at 12:31 PM by Reno Adrian

## 2019-07-03 ENCOUNTER — TELEPHONE (OUTPATIENT)
Dept: FAMILY MEDICINE | Facility: CLINIC | Age: 29
End: 2019-07-03

## 2019-07-03 NOTE — TELEPHONE ENCOUNTER
Forms from [facility]- Physician Orders placed on Dr. Davis desk for completion    Clare Butler

## 2019-07-03 NOTE — TELEPHONE ENCOUNTER
Forms from [facility]-Speech Therapy Discharge placed on Dr. Davis desjacobo for completion    Clare Butler

## 2019-07-03 NOTE — TELEPHONE ENCOUNTER
Forms from [facility]- Plan of Care placed on Dr. Susan angeles for completion    Clare Butler

## 2019-07-03 NOTE — TELEPHONE ENCOUNTER
Forms from [facility]- Physicians Orders placed on Dr. Davis desjacobo for completion    Clare Butler

## 2019-07-05 ENCOUNTER — TELEPHONE (OUTPATIENT)
Dept: FAMILY MEDICINE | Facility: CLINIC | Age: 29
End: 2019-07-05

## 2019-07-05 ENCOUNTER — ANTICOAGULATION THERAPY VISIT (OUTPATIENT)
Dept: NURSING | Facility: CLINIC | Age: 29
End: 2019-07-05
Payer: COMMERCIAL

## 2019-07-05 DIAGNOSIS — I26.99 PULMONARY EMBOLISM WITH INFARCTION (H): ICD-10-CM

## 2019-07-05 LAB — INR PPP: 1.3

## 2019-07-05 NOTE — TELEPHONE ENCOUNTER
Reason for call:  INR   Who is calling? Caregiver    Phone number: [phone]    Name of caller: PREMA    INR Value: 1.3 - FINGER STICK    Are there any other concerns: No  Route this INR message to [facility] # [id number]    Phone number to reach patient:  Home number on file [phone] (home)    Best Time:  ANY     Can we leave a detailed message on this number?  YES

## 2019-07-05 NOTE — PROGRESS NOTES
ANTICOAGULATION FOLLOW-UP CLINIC VISIT    Patient Name:  Hilaria Bonner  Date:  2019  Contact Type:  Telephone/ Wocrvy-[phone]    SUBJECTIVE:  Patient Findings         Clinical Outcomes     Negatives:   Major bleeding event, Thromboembolic event, Anticoagulation-related hospital admission, Anticoagulation-related ED visit, Anticoagulation-related fatality           OBJECTIVE    INR   Date Value Ref Range Status   2019 1.3  Final       ASSESSMENT / PLAN  INR assessment SUB    Recheck INR In: 3 DAYS    INR Location [facility] INR      Anticoagulation Summary  As of 2019    INR goal:   2.0-3.0   TTR:   0.0 % (3.7 wk)   INR used for dosin.3! (2019)   Warfarin maintenance plan:   6 mg (2 mg x 3) every Wed, Sat; 8 mg (2 mg x 4) all other days   Full warfarin instructions:   7: 12 mg; 7/6: 12 mg; 7/7: 12 mg; Otherwise 6 mg every Wed, Sat; 8 mg all other days   Weekly warfarin total:   52 mg   Plan last modified:   Kait Manriquez RN (2019)   Next INR check:   2019   Target end date:            Anticoagulation Episode Summary     INR check location:   Anticoagulation Clinic    Preferred lab:       Send INR reminders to:   BELIA BRADEN    Comments:         Anticoagulation Care Providers     Provider Role Specialty Phone number    Crissy Madrigal PA-C Referring [facility] [phone]            See the Encounter Report to view Anticoagulation Flowsheet and Dosing Calendar (Go to Encounters tab in chart review, and find the Anticoagulation Therapy Visit)    Dosage adjustment made based on physician directed care plan. Increased dosage by 13% and will have patient continue bridging as she is still subtherapeutic. Home care to recheck in 3 days. Sent refill of Lovenox to pharmacy as well to ensure patient does not run out over the weekend.         Ayana Tay RN, BSN, PHN

## 2019-07-05 NOTE — TELEPHONE ENCOUNTER
Please see the July 5, 2019 Anticoagulation encounter for further information.        Ayana Tay RN, BSN, PHN, [facility]

## 2019-07-08 ENCOUNTER — MEDICAL CORRESPONDENCE (OUTPATIENT)
Dept: HEALTH INFORMATION MANAGEMENT | Facility: CLINIC | Age: 29
End: 2019-07-08

## 2019-07-08 ENCOUNTER — TELEPHONE (OUTPATIENT)
Dept: FAMILY MEDICINE | Facility: CLINIC | Age: 29
End: 2019-07-08

## 2019-07-08 ENCOUNTER — ANTICOAGULATION THERAPY VISIT (OUTPATIENT)
Dept: NURSING | Facility: CLINIC | Age: 29
End: 2019-07-08
Payer: COMMERCIAL

## 2019-07-08 DIAGNOSIS — I26.99 ACUTE MASSIVE PULMONARY EMBOLISM (H): ICD-10-CM

## 2019-07-08 LAB — INR PPP: 1.8 (ref 0.9–1.1)

## 2019-07-08 RX ORDER — WARFARIN SODIUM 4 MG/1
TABLET ORAL
Qty: 270 TABLET | Refills: 0 | Status: SHIPPED | OUTPATIENT
Start: 2019-07-08 | End: 2019-08-14

## 2019-07-08 NOTE — TELEPHONE ENCOUNTER
Please see the July 8, 2019 Anticoagulation encounter for further information.  Kait Manriquez RN, [facility]

## 2019-07-08 NOTE — PROGRESS NOTES
ANTICOAGULATION FOLLOW-UP CLINIC VISIT    Patient Name:  Hilaria Bonner  Date:  2019  Contact Type:  Telephone/ Moon RN home care [phone] and Hilaria [phone]    SUBJECTIVE:  Patient Findings     Comments:   Still on lovenox. Tablet size changed to 4 mg tablet.        Clinical Outcomes     Negatives:   Major bleeding event, Thromboembolic event, Anticoagulation-related hospital admission, Anticoagulation-related ED visit, Anticoagulation-related fatality    Comments:   Still on lovenox. Tablet size changed to 4 mg tablet.           OBJECTIVE    INR   Date Value Ref Range Status   2019 1.8 (A) 0.9 - 1.1 Final       ASSESSMENT / PLAN  INR assessment SUB    Recheck INR In: 3 DAYS    INR Location [facility] INR      Anticoagulation Summary  As of 2019    INR goal:   2.0-3.0   TTR:   0.0 % (4.1 wk)   INR used for dosin.8! (2019)   Warfarin maintenance plan:   12 mg (4 mg x 3) every day   Full warfarin instructions:   : 16 mg; : 12 mg; 7/10: 16 mg; Otherwise 12 mg every day   Weekly warfarin total:   84 mg   Plan last modified:   Kait Manriquez RN (2019)   Next INR check:   2019   Target end date:            Anticoagulation Episode Summary     INR check location:   Anticoagulation Clinic    Preferred lab:       Send INR reminders to:   BELIA BRADEN    Comments:         Anticoagulation Care Providers     Provider Role Specialty Phone number    Crissy Madrigal PA-C Referring [facility] [phone]            See the Encounter Report to view Anticoagulation Flowsheet and Dosing Calendar (Go to Encounters tab in chart review, and find the Anticoagulation Therapy Visit)    Dosage adjustment made based on physician directed care plan. Reviewed dosing and tablet size with Radha Ríos [facility]. Dosing is as noted on calendar. New tablet size of 4 mg sent to pharmacy. Reviewed next three day dosing in mg's and tablet color with the patient. Also  informed home care of the dosing and recheck on Thursday. Patient is still on lovenox.    Patient states negative for signs and symptoms of bleeding or blood clots, changes in medication, changes in diet, any signs of infection or antibiotic use, anything new OTC or herbal medications, any missed or extra doses of the warfarin.    Patient informed of the symptoms to be seen for either by INR nurse or ER.      Kait Manriquez RN

## 2019-07-08 NOTE — TELEPHONE ENCOUNTER
.Reason for Call:  INR by finger stick    Who is calling?  Home Care: [facility]    Phone number:  [phone]    Fax number:  n/a    Name of caller: Moon    INR Value:  1.8    Are there any other concerns:  No    Can we leave a detailed message on this number? YES    Phone number patient can be reached at: Home number on file [phone] (home)      Call taken on 7/8/2019 at 2:38 PM by Argentina Maldonado

## 2019-07-09 ENCOUNTER — ANCILLARY PROCEDURE (OUTPATIENT)
Dept: CT IMAGING | Facility: CLINIC | Age: 29
End: 2019-07-09
Attending: INTERNAL MEDICINE
Payer: COMMERCIAL

## 2019-07-09 DIAGNOSIS — I26.99 PULMONARY EMBOLISM WITH INFARCTION (H): ICD-10-CM

## 2019-07-09 PROCEDURE — 71260 CT THORAX DX C+: CPT | Performed by: RADIOLOGY

## 2019-07-09 RX ORDER — IOPAMIDOL 755 MG/ML
80 INJECTION, SOLUTION INTRAVASCULAR ONCE
Status: COMPLETED | OUTPATIENT
Start: 2019-07-09 | End: 2019-07-09

## 2019-07-09 RX ADMIN — IOPAMIDOL 80 ML: 755 INJECTION, SOLUTION INTRAVASCULAR at 08:59

## 2019-07-11 ENCOUNTER — ANTICOAGULATION THERAPY VISIT (OUTPATIENT)
Dept: NURSING | Facility: CLINIC | Age: 29
End: 2019-07-11
Payer: COMMERCIAL

## 2019-07-11 ENCOUNTER — TELEPHONE (OUTPATIENT)
Dept: FAMILY MEDICINE | Facility: CLINIC | Age: 29
End: 2019-07-11

## 2019-07-11 ENCOUNTER — TRANSFERRED RECORDS (OUTPATIENT)
Dept: HEALTH INFORMATION MANAGEMENT | Facility: CLINIC | Age: 29
End: 2019-07-11

## 2019-07-11 LAB — INR PPP: 2.3 (ref 0.9–1.1)

## 2019-07-11 NOTE — TELEPHONE ENCOUNTER
[facility] Physician Order form regarding PT evaluation placed on Dr. Davis 's desk for completion.      Noemi BLUNT)(MANDY)

## 2019-07-11 NOTE — TELEPHONE ENCOUNTER
Patient stating that constant chest tightness came back, similar to one she had in May when was at the hospital.  She had it for 5 days.  Patient can't explain if it is a chest pain.    Patient denies: SOB, chills, back or shoulder pain.     Advised be seen at ER today, someone to take patient in.  She agreed with the plan.    Summer Terrazas, Jaclyn López RN

## 2019-07-11 NOTE — PROGRESS NOTES
ANTICOAGULATION FOLLOW-UP CLINIC VISIT    Patient Name:  Hilaria Bonner  Date:  2019  Contact Type:  Telephone/ Hilaria and voicemail left on home care nurse's number    SUBJECTIVE:  Patient Findings         Clinical Outcomes     Negatives:   Major bleeding event, Thromboembolic event, Anticoagulation-related hospital admission, Anticoagulation-related ED visit, Anticoagulation-related fatality           OBJECTIVE    INR   Date Value Ref Range Status   2019 2.3 (A) 0.9 - 1.1 Final       ASSESSMENT / PLAN  INR assessment THER    Recheck INR In: 4 DAYS    INR Location [facility] INR      Anticoagulation Summary  As of 2019    INR goal:   2.0-3.0   TTR:   5.6 % (1.1 mo)   INR used for dosin.3 (2019)   Warfarin maintenance plan:   12 mg (4 mg x 3) every day   Full warfarin instructions:   : 10 mg; Otherwise 12 mg every day   Weekly warfarin total:   84 mg   Plan last modified:   Kait Manriquez RN (2019)   Next INR check:   7/15/2019   Target end date:            Anticoagulation Episode Summary     INR check location:   Anticoagulation Clinic    Preferred lab:       Send INR reminders to:   BELIA BRADEN    Comments:         Anticoagulation Care Providers     Provider Role Specialty Phone number    ScottstacyCrissy PA-C Referring [facility] [phone]            See the Encounter Report to view Anticoagulation Flowsheet and Dosing Calendar (Go to Encounters tab in chart review, and find the Anticoagulation Therapy Visit)    Dosage adjustment made based on physician directed care plan.    Writer huddled with Agustin Shaikh RPh. He reviewed patient's recent INR values, warfarin dosing, Lovenox dosing, and health history.     Per protocol, Agustin states that patient can discontinue Lovenox - but if she does come back below therapeutic range on Monday, restarting Lovenox should be considered.     Due to history of poor absorption/inconsistent absorption with her  previous bariatric sleeve surgery, this unfortunately can make determining therapeutic warfarin dosing difficult. Agustin advising on warfarin dosing of 10 mg today 7/11/19 and then 12 mg daily until next INR recheck with home care on Monday 7/15/19.     Writer called and spoke directly with patient. She states understanding on the above orders.     Denies any changes to medications or other diet issues. Denies any missed doses or extra doses. Denies bleeding, bruising, or blood clots. Verbalizes understanding of dosing and recheck date.   Patient is aware if signs of clotting (pain, tenderness, swelling, color change in leg or arm, SOB) and bleeding occur (blood in stool, urine, large bruising, bleeding gums, nosebleeds) to have INR check sooner. If sx severe report to ER or concerned for stroke call 911. If general questions or concerns arise, call clinic.     Patient did mention chest tightness while on phone with writer - writer transferred patient to speak with an RN triage nurse to further discuss symptoms. Please see telephone encounter from 7/11/19 for further triage information.    Kierra Cottrell, JONATHANN, RN, PHN

## 2019-07-11 NOTE — TELEPHONE ENCOUNTER
[facility] speech therapy discharge summary placed on Dr. Davis 's desk for completion.      Noemi OILVO (R))

## 2019-07-11 NOTE — TELEPHONE ENCOUNTER
Called patient to discuss INR and went over warfarin orders and recheck date.     Patient is complaining of returning chest tightness - similar to when she was in the hospital. Denies SOB.     Please further triage patient - she can be reached at [phone].    Kierra Cottrell, JONATHANN, RN, PHN

## 2019-07-11 NOTE — TELEPHONE ENCOUNTER
Reason for Call:  INR    Who is calling?  Home Care: [facility]    Phone number:  [phone]    Fax number:      Name of caller: Emilie    INR Value: 2.3    Are there any other concerns:  No    Can we leave a detailed message on this number? YES    Phone number patient can be reached at:       Call taken on 7/11/2019 at 2:07 PM by Arron Black          2.3 by finger stick.

## 2019-07-15 ENCOUNTER — TELEPHONE (OUTPATIENT)
Dept: FAMILY MEDICINE | Facility: CLINIC | Age: 29
End: 2019-07-15

## 2019-07-15 LAB — INR PPP: 2.4 (ref 0.9–1.1)

## 2019-07-15 NOTE — TELEPHONE ENCOUNTER
".Reason for Call:  INR RESULTS     Detailed comments: Emilie - Homecare Visit nurse called - Stated \" Pt's INR is good- Last week it was 2.3 and she stopped lovenox shots  and took all medications-  Today INR was 2.4 - states pt. Has elected last weeks INR  dose  for tonight.     Phone Number Patient can be reached at: Emilie can be reached at ( [phone]    Best Time:     Can we leave a detailed message on this number? YES    Call taken on 7/15/2019 at 6:26 PM by Shwetha Sanabria    "

## 2019-07-16 ENCOUNTER — ANTICOAGULATION THERAPY VISIT (OUTPATIENT)
Dept: NURSING | Facility: CLINIC | Age: 29
End: 2019-07-16
Payer: COMMERCIAL

## 2019-07-16 ENCOUNTER — TELEPHONE (OUTPATIENT)
Dept: FAMILY MEDICINE | Facility: CLINIC | Age: 29
End: 2019-07-16

## 2019-07-16 ENCOUNTER — TELEPHONE (OUTPATIENT)
Dept: NURSING | Facility: CLINIC | Age: 29
End: 2019-07-16

## 2019-07-16 NOTE — TELEPHONE ENCOUNTER
Please see anticoagulation encounter from July 16, 2019 for further information.    Kierra Cottrell RN, BSN

## 2019-07-16 NOTE — PROGRESS NOTES
ANTICOAGULATION FOLLOW-UP CLINIC VISIT    Patient Name:  Hilaria Bonner  Date:  2019  Contact Type:  Telephone/ Hilaria and Emilie CAAL    SUBJECTIVE:  Patient Findings         Clinical Outcomes     Negatives:   Major bleeding event, Thromboembolic event, Anticoagulation-related hospital admission, Anticoagulation-related ED visit, Anticoagulation-related fatality           OBJECTIVE    INR   Date Value Ref Range Status   07/15/2019 2.4 (A) 0.9 - 1.1 Final       ASSESSMENT / PLAN  INR assessment THER    Recheck INR In: 3 DAYS    INR Location [facility] INR      Anticoagulation Summary  As of 2019    INR goal:   2.0-3.0   TTR:   16.1 % (1.2 mo)   INR used for dosin.4 (7/15/2019)   Warfarin maintenance plan:   12 mg (4 mg x 3) every day   Full warfarin instructions:   : 14 mg; Otherwise 12 mg every day   Weekly warfarin total:   84 mg   Plan last modified:   Kait Manriquez RN (2019)   Next INR check:   2019   Target end date:            Anticoagulation Episode Summary     INR check location:   Anticoagulation Clinic    Preferred lab:       Send INR reminders to:   BELIA BRADEN    Comments:         Anticoagulation Care Providers     Provider Role Specialty Phone number    Crissy Madrigal PA-C Referring [facility] [phone]            See the Encounter Report to view Anticoagulation Flowsheet and Dosing Calendar (Go to Encounters tab in chart review, and find the Anticoagulation Therapy Visit)    Dosage adjustment made based on physician directed care plan. Patient has had 90 mg total in the last 7 days - she was given increased doses last week to help patient get into range, as she was still on Lovenox. It has also been noted that patient's absorption of warfarin is not consistent, so she may be a slow metabolizer. Writer advised patient to take warfarin as 12 mg today, 14 mg tomorrow, and 12 mg on Thursday, with an INR recheck on Friday. Patient and TEN Phan  state understanding.    Denies any changes to medications or other diet issues. Denies any missed doses or extra doses. Denies bleeding, bruising, or blood clots. Verbalizes understanding of dosing and recheck date.   Patient is aware if signs of clotting (pain, tenderness, swelling, color change in leg or arm, SOB) and bleeding occur (blood in stool, urine, large bruising, bleeding gums, nosebleeds) to have INR check sooner. If sx severe report to ER or concerned for stroke call 911. If general questions or concerns arise, call clinic.         Kierra Cottrell, JONATHANN, RN, PHN

## 2019-07-16 NOTE — PROGRESS NOTES
ANTICOAGULATION FOLLOW-UP CLINIC VISIT    Patient Name:  Hilaria Bonner  Date:  2019  Contact Type:  Telephone/ Hilaria    SUBJECTIVE:  Patient Findings         Clinical Outcomes     Negatives:   Major bleeding event, Thromboembolic event, Anticoagulation-related hospital admission, Anticoagulation-related ED visit, Anticoagulation-related fatality           OBJECTIVE    INR   Date Value Ref Range Status   07/15/2019 2.4 (A) 0.9 - 1.1 Final       ASSESSMENT / PLAN  INR assessment THER    Recheck INR In: 3 DAYS    INR Location [facility] INR      Anticoagulation Summary  As of 2019    INR goal:   2.0-3.0   TTR:   16.1 % (1.2 mo)   INR used for dosin.4 (7/15/2019)   Warfarin maintenance plan:   12 mg (4 mg x 3) every day   Full warfarin instructions:   : 14 mg; : 14 mg; Otherwise 12 mg every day   Weekly warfarin total:   84 mg   Plan last modified:   Kait Manriquez RN (2019)   Next INR check:   2019   Target end date:            Anticoagulation Episode Summary     INR check location:   Anticoagulation Clinic    Preferred lab:       Send INR reminders to:   BELIA BRADEN    Comments:         Anticoagulation Care Providers     Provider Role Specialty Phone number    Crissy Madrigal PA-C Referring [facility] [phone]            See the Encounter Report to view Anticoagulation Flowsheet and Dosing Calendar (Go to Encounters tab in chart review, and find the Anticoagulation Therapy Visit)    Dosage adjustment made based on physician directed care plan.Writer huddled with Radha Ríos RPh to go over dosing.     She is advising to have patient take 14 mg on 19 and 19 and 12 mg AOD, with a recheck of INR on 19.     Writer called patient and asked her to call back clinic to discuss dosing changes. Writer called home care nurse Emilie to discuss and updated her on warfarin orders and recheck date. She states understanding.    Kierra  ANGE Cottrell, RN, PHN    Patient called back clinic and spoke with writer. Relayed warfarin dosing instructions and next INR recheck date next Monday - patient read back orders and states understanding. No further questions.    ANGE Madden, RN, PHN

## 2019-07-16 NOTE — TELEPHONE ENCOUNTER
[facility] physician order form for INR placed on Dr. Davis's desk for completion.      Noemi CEVALLOS (R)(MANDY)

## 2019-07-16 NOTE — TELEPHONE ENCOUNTER
This writer attempted to contact [facility] on 07/16/19      Reason for call changes to warfarin dosing and change to recheck date and left message.      If patient calls back:  Call center, please see if INR nurse is available to have return call transferred to them.   INR Registered Nurse called. Inform patient that someone from the INR group will contact them, document that pt called and route to [facility] [[id number]]        Kierra Cottrell, JONATHANN, RN, PHN

## 2019-07-22 ENCOUNTER — TELEPHONE (OUTPATIENT)
Dept: FAMILY MEDICINE | Facility: CLINIC | Age: 29
End: 2019-07-22

## 2019-07-22 NOTE — TELEPHONE ENCOUNTER
[place] forms placed on  [facility] desk for completion.    Carolann ESTRELLA, Patient Care

## 2019-07-22 NOTE — TELEPHONE ENCOUNTER
Called and informed Moon that recheck is noted as 7/22/19 in the chart. She will try and send another nurse out for INR today.    Kait Manriquez RN, [facility] Triage

## 2019-07-22 NOTE — TELEPHONE ENCOUNTER
Reason for call: clarify order    Patient called regarding (reason for call): call back  [phone]    Additional comments:   Moon with [facility] is calling  to clarify that you want INR drawn on  Friday 7/26   Call to confirm and ok to leave vm    Phone number to reach patient:      Best Time:  any    Can we leave a detailed message on this number?  YES

## 2019-07-22 NOTE — TELEPHONE ENCOUNTER
Called and informed Moon to repeat last weeks 12 mg for today and then to recheck tomorrow.    Kait Manriquez RN, [facility] Triage

## 2019-07-22 NOTE — TELEPHONE ENCOUNTER
Reason for Call:  Other INR  Detailed comments: Requesting to get INR tomorrow 07/23/19 instead of today and clarify the dosing Pt should take today.     Phone Number Patient can be reached at: Other phone number:  [phone]    Best Time: anytime    Can we leave a detailed message on this number? YES    Call taken on 7/22/2019 at 12:44 PM by Reno Adrian

## 2019-07-23 ENCOUNTER — ANTICOAGULATION THERAPY VISIT (OUTPATIENT)
Dept: NURSING | Facility: CLINIC | Age: 29
End: 2019-07-23
Payer: COMMERCIAL

## 2019-07-23 ENCOUNTER — MYC MEDICAL ADVICE (OUTPATIENT)
Dept: FAMILY MEDICINE | Facility: CLINIC | Age: 29
End: 2019-07-23

## 2019-07-23 ENCOUNTER — TELEPHONE (OUTPATIENT)
Dept: FAMILY MEDICINE | Facility: CLINIC | Age: 29
End: 2019-07-23

## 2019-07-23 LAB — INR PPP: 2.1 (ref 0.9–1.1)

## 2019-07-23 NOTE — PROGRESS NOTES
ANTICOAGULATION FOLLOW-UP CLINIC VISIT    Patient Name:  Hilaria Bonner  Date:  2019  Contact Type:  Telephone/ Moon -[phone] and Hilaria in [facility] message    SUBJECTIVE:  Patient Findings         Clinical Outcomes     Negatives:   Major bleeding event, Thromboembolic event, Anticoagulation-related hospital admission, Anticoagulation-related ED visit, Anticoagulation-related fatality           OBJECTIVE    INR   Date Value Ref Range Status   2019 2.1 (A) 0.9 - 1.1 Final       ASSESSMENT / PLAN  INR assessment THER    Recheck INR In: 1 WEEK    INR Location [facility] INR      Anticoagulation Summary  As of 2019    INR goal:   2.0-3.0   TTR:   31.4 % (1.5 mo)   INR used for dosin.1 (2019)   Warfarin maintenance plan:   12 mg (4 mg x 3) every day   Full warfarin instructions:   : 14 mg; : 14 mg; Otherwise 12 mg every day   Weekly warfarin total:   84 mg   Plan last modified:   Kait Manriquez RN (2019)   Next INR check:   2019   Target end date:            Anticoagulation Episode Summary     INR check location:   Anticoagulation Clinic    Preferred lab:       Send INR reminders to:   BELIA BRADEN    Comments:         Anticoagulation Care Providers     Provider Role Specialty Phone number    Crissy Madrigal PA-C Referring [facility] [phone]            See the Encounter Report to view Anticoagulation Flowsheet and Dosing Calendar (Go to Encounters tab in chart review, and find the Anticoagulation Therapy Visit)    Continue weekly warfarin dosing of 14 mg on Mon and Fri and 12 mg AOD.     Denies any changes to medications or other diet issues. Denies any missed doses or extra doses. Denies bleeding, bruising, or blood clots. Verbalizes understanding of dosing and recheck date.   Patient is aware if signs of clotting (pain, tenderness, swelling, color change in leg or arm, SOB) and bleeding occur (blood in stool, urine, large bruising,  bleeding gums, nosebleeds) to have INR check sooner. If sx severe report to ER or concerned for stroke call 911. If general questions or concerns arise, call clinic.       Kierra Cottrell, JONATHANN, RN, PHN

## 2019-07-23 NOTE — TELEPHONE ENCOUNTER
[facility] PT discharge summary placed on Dr. Davis's desk for completion.      Noemi OLIVO (R))

## 2019-07-23 NOTE — TELEPHONE ENCOUNTER
Please see anticoagulation encounter from July 23, 2019 for further information.    Kierra Cottrell RN, BSN

## 2019-07-24 ENCOUNTER — TELEPHONE (OUTPATIENT)
Dept: FAMILY MEDICINE | Facility: CLINIC | Age: 29
End: 2019-07-24

## 2019-07-24 ENCOUNTER — PATIENT OUTREACH (OUTPATIENT)
Dept: CARE COORDINATION | Facility: CLINIC | Age: 29
End: 2019-07-24

## 2019-07-24 ASSESSMENT — ACTIVITIES OF DAILY LIVING (ADL): DEPENDENT_IADLS:: INDEPENDENT

## 2019-07-24 NOTE — LETTER
[facility]  Complex Care Plan  About Me:    Patient Name:  Hilaria Bonner    YOB: 1990  Age:         28 year old   [place] MRN:    [id number] Telephone Information:  Home Phone [phone]   Mobile [phone]       Address:  [address]  [address]  [place] [address] Email address:  [email or web address]      Emergency Contact(s)    Name Relationship Lgl Grd Work Phone Home Phone Mobile Phone   1. MANJULA BONNER Mother   [phone]           Primary language:  English     needed? No   [employer]:  [phone] op. 1  Other communication barriers: (Speech delay)  Preferred Method of Communication:  Ferdinand  Current living arrangement: I live in a private home with family  Mobility Status/ Medical Equipment: Independent    Health Maintenance  Health Maintenance Reviewed:      My Access Plan  Medical Emergency 911   Primary Clinic Line [facility] - [phone]   24 Hour Appointment Line [phone] or  [phone]) (toll-free)   24 Hour Nurse Line [phone] (toll-free)   Preferred Urgent Care     Preferred Hospital     Preferred Pharmacy [facility] [address] [place] [address] - [address] [facility]     Behavioral Health Crisis Line The National Suicide Prevention Lifeline at 1-249.482.2517 or 911             My Care Team Members  Patient Care Team       Relationship Specialty Notifications Start End    Clinic, [facility] PCP - General   12/6/16     Phone: [phone] Fax: [phone] [address] [address]    Susan Stephens RD Registered Dietitian Dietitian, Registered  1/16/18     Phone: [phone] Fax: [phone] [address] [address]    Joanne Sterling PA-C Physician Assistant Physician Assistant  1/16/18     Phone: [phone] Fax: [phone] [address]  [facility] [address] [address]    Terri Cody PA-C Physician Assistant Physician Assistant  1/16/18     Phone: [phone] Fax: [phone]         [address] [facility] [address]    Monica Hernandez, RN Lead Care Coordinator Primary Care - [facility] Admissions 5/29/19     Phone: [phone] Fax: [phone]        [facility]   5/30/19     Phone: [phone]         Crissy Madrigal PA-C Assigned PCP   6/9/19     Phone: [phone] Fax: [phone] [address] [facility] [address] [facility] [address]            My Care Plans  Self Management and Treatment Plan  Goals and (Comments)  Goals        General    Improve chronic symptoms (pt-stated)     Notes - Note created  7/24/2019  1:10 PM by Monica Hernandez RN    Goal Statement: I want the chest pains to go away    Measure of Success: When my chest no longer hurts    Supportive Steps to Achieve: Tylenol, ice or heat for comfort    Barriers: Multiple fractured ribs due to CPR    Strengths: Age    Date to Achieve By: November 2019    Patient expressed understanding of goal: Yes               Action Plans on File:                       Advance Care Plans/Directives Type:        My Medical and Care Information  Problem List   Patient Active Problem List   Diagnosis     Morbid obesity (H)     Vitamin D deficiency     Elevated parathyroid hormone     Encounter for smoking cessation counseling     Menorrhagia with irregular cycle     Morbid (severe) obesity due to excess calories (H)     Pulmonary embolism with infarction (H)      Current Medications and Allergies:  See printed Medication Report.    Care Coordination Start Date: No linked episodes   Frequency of Care Coordination: monthly   Form Last Updated: 07/24/2019

## 2019-07-24 NOTE — PROGRESS NOTES
Clinic Care Coordination Contact    Follow Up Progress Note      Assessment: 28 year old female that had acute MI due to massive PE.     Patient states that she continue to have chest pains. She states these are the same as previously from the CPR.  Patient denies shortness of breath, denies diaphoresis, denies nausea.  (patient had multiple fractured ribs due to 55 minutes of CPR)      Patient had a gastric sleeve placed in March 2019. Due to her MI she has not followed up with them. Patient will make a follow up appointment.    Patient states she continues to have some memory issues. Has trouble finding words sometimes.  She is able to have a better conversation than previously. Patient is scheduled for neuropsych testing on 9/16/19. Confirmed appointment with patient.     Patient continues to have skilled home care nurse weekly. They continue to draw her INR.     Goals addressed this encounter:   Goals Addressed                 This Visit's Progress      Improve chronic symptoms (pt-stated)   30%     Goal Statement: I want the chest pains to go away    Measure of Success: When my chest no longer hurts    Supportive Steps to Achieve: Tylenol, ice or heat for comfort    Barriers: Multiple fractured ribs due to CPR    Strengths: Age    Date to Achieve By: November 2019    Patient expressed understanding of goal: Yes          Intervention/Education provided during outreach: Reviewed appointments. Use ice or heat on chest for comfort     Outreach Frequency: monthly    Plan:   Patient will attend scheduled appointments.  Patient will be compliant with medications as instructed.    Care Coordinator will follow up in one month.    Monica Hernandez RN, [facility] RN Coordinator  [facility]   7/24/2019    1:20 PM  [phone]

## 2019-07-25 ENCOUNTER — TELEPHONE (OUTPATIENT)
Dept: FAMILY MEDICINE | Facility: CLINIC | Age: 29
End: 2019-07-25

## 2019-07-25 ENCOUNTER — MEDICAL CORRESPONDENCE (OUTPATIENT)
Dept: HEALTH INFORMATION MANAGEMENT | Facility: CLINIC | Age: 29
End: 2019-07-25

## 2019-07-29 DIAGNOSIS — N17.9 ACUTE KIDNEY INJURY (H): Primary | ICD-10-CM

## 2019-07-30 ENCOUNTER — TELEPHONE (OUTPATIENT)
Dept: FAMILY MEDICINE | Facility: CLINIC | Age: 29
End: 2019-07-30

## 2019-07-30 DIAGNOSIS — I26.99 PULMONARY EMBOLISM WITH INFARCTION (H): Primary | ICD-10-CM

## 2019-07-30 NOTE — TELEPHONE ENCOUNTER
Writer called and spoke with patient. She is due for INR today and writer was checking in tot see if home care was coming out to see her and check INR.     Patient states she is unsure - she will contact home care agency now and see if they can come today.     If they cannot, patient will take her maintenance warfarin dose of 12 mg tonight with an INR recheck tomorrow at her office visit with Crissy Madrigal tomorrow afternoon 7/31/19. Writer placed an INR order so patient can have this done and results can be cc'd to [facility] pool.     Routing to provider to update. Please advise if you wish for plan to be modified.    Kierra Cottrell, BSN, RN, PHN

## 2019-07-30 NOTE — TELEPHONE ENCOUNTER
Please see [facility] Medical Advice encounter from 7/23/19. Patient sent [facility] message regarding follow-up for INR today and tomorrow in that previous [facility] message thread. Patient has OV tomorrow and will have INR drawn then - writer will contact patient once result is completed.    Kierra Cottrell, JONATHANN, RN, PHN

## 2019-07-30 NOTE — TELEPHONE ENCOUNTER
[facility] physican order (x3) form placed on Dr. Davis 's desk for completion.      Noemi BLUNT)(MANDY)

## 2019-07-30 NOTE — TELEPHONE ENCOUNTER
Reason for Call:  Other INR    Detailed comments: Pt calling to follow up for she has additional questions and concerns  she would like to address with the INR Nurse that she would like a call back    Phone Number Patient can be reached at: Home number on file [phone] (home)    Best Time: anytime    Can we leave a detailed message on this number? YES    Call taken on 7/30/2019 at 2:41 PM by Reno Adrian

## 2019-07-31 ENCOUNTER — OFFICE VISIT (OUTPATIENT)
Dept: FAMILY MEDICINE | Facility: CLINIC | Age: 29
End: 2019-07-31
Payer: COMMERCIAL

## 2019-07-31 ENCOUNTER — TELEPHONE (OUTPATIENT)
Dept: FAMILY MEDICINE | Facility: CLINIC | Age: 29
End: 2019-07-31

## 2019-07-31 ENCOUNTER — ANTICOAGULATION THERAPY VISIT (OUTPATIENT)
Dept: NURSING | Facility: CLINIC | Age: 29
End: 2019-07-31

## 2019-07-31 VITALS
RESPIRATION RATE: 18 BRPM | SYSTOLIC BLOOD PRESSURE: 128 MMHG | TEMPERATURE: 98.4 F | HEIGHT: 66 IN | OXYGEN SATURATION: 99 % | WEIGHT: 251 LBS | DIASTOLIC BLOOD PRESSURE: 84 MMHG | BODY MASS INDEX: 40.34 KG/M2 | HEART RATE: 82 BPM

## 2019-07-31 DIAGNOSIS — E87.6 HYPOKALEMIA: ICD-10-CM

## 2019-07-31 DIAGNOSIS — I26.99 PULMONARY EMBOLISM WITH INFARCTION (H): ICD-10-CM

## 2019-07-31 DIAGNOSIS — R30.0 DYSURIA: ICD-10-CM

## 2019-07-31 DIAGNOSIS — E87.6 HYPOKALEMIA: Primary | ICD-10-CM

## 2019-07-31 DIAGNOSIS — Z72.0 TOBACCO ABUSE: ICD-10-CM

## 2019-07-31 DIAGNOSIS — K59.00 CONSTIPATION, UNSPECIFIED CONSTIPATION TYPE: ICD-10-CM

## 2019-07-31 LAB
ALBUMIN UR-MCNC: 30 MG/DL
ANION GAP SERPL CALCULATED.3IONS-SCNC: 8 MMOL/L (ref 3–14)
APPEARANCE UR: CLEAR
BACTERIA #/AREA URNS HPF: ABNORMAL /HPF
BILIRUB UR QL STRIP: NEGATIVE
BUN SERPL-MCNC: 4 MG/DL (ref 7–30)
CALCIUM SERPL-MCNC: 8.5 MG/DL (ref 8.5–10.1)
CHLORIDE SERPL-SCNC: 111 MMOL/L (ref 94–109)
CO2 SERPL-SCNC: 27 MMOL/L (ref 20–32)
COLOR UR AUTO: YELLOW
CREAT SERPL-MCNC: 0.64 MG/DL (ref 0.52–1.04)
GFR SERPL CREATININE-BSD FRML MDRD: >90 ML/MIN/{1.73_M2}
GLUCOSE SERPL-MCNC: 89 MG/DL (ref 70–99)
GLUCOSE UR STRIP-MCNC: NEGATIVE MG/DL
HGB UR QL STRIP: ABNORMAL
HYALINE CASTS #/AREA URNS LPF: ABNORMAL /LPF
INR PPP: 2.5 (ref 0.86–1.14)
KETONES UR STRIP-MCNC: NEGATIVE MG/DL
LEUKOCYTE ESTERASE UR QL STRIP: NEGATIVE
MUCOUS THREADS #/AREA URNS LPF: PRESENT /LPF
NITRATE UR QL: NEGATIVE
NON-SQ EPI CELLS #/AREA URNS LPF: ABNORMAL /LPF
PH UR STRIP: 7 PH (ref 5–7)
POTASSIUM SERPL-SCNC: 3 MMOL/L (ref 3.4–5.3)
RBC #/AREA URNS AUTO: ABNORMAL /HPF
RENAL EPI CELLS #/AREA URNS HPF: ABNORMAL /HPF
SODIUM SERPL-SCNC: 146 MMOL/L (ref 133–144)
SOURCE: ABNORMAL
SP GR UR STRIP: 1.02 (ref 1–1.03)
TSH SERPL DL<=0.005 MIU/L-ACNC: 0.44 MU/L (ref 0.4–4)
UROBILINOGEN UR STRIP-ACNC: 0.2 EU/DL (ref 0.2–1)
WBC #/AREA URNS AUTO: ABNORMAL /HPF

## 2019-07-31 PROCEDURE — 81001 URINALYSIS AUTO W/SCOPE: CPT | Performed by: PHYSICIAN ASSISTANT

## 2019-07-31 PROCEDURE — 87086 URINE CULTURE/COLONY COUNT: CPT | Performed by: PHYSICIAN ASSISTANT

## 2019-07-31 PROCEDURE — 84443 ASSAY THYROID STIM HORMONE: CPT | Performed by: PHYSICIAN ASSISTANT

## 2019-07-31 PROCEDURE — 36415 COLL VENOUS BLD VENIPUNCTURE: CPT | Performed by: PHYSICIAN ASSISTANT

## 2019-07-31 PROCEDURE — 99214 OFFICE O/P EST MOD 30 MIN: CPT | Performed by: PHYSICIAN ASSISTANT

## 2019-07-31 PROCEDURE — 80048 BASIC METABOLIC PNL TOTAL CA: CPT | Performed by: PHYSICIAN ASSISTANT

## 2019-07-31 PROCEDURE — 85610 PROTHROMBIN TIME: CPT | Performed by: PHYSICIAN ASSISTANT

## 2019-07-31 RX ORDER — POLYETHYLENE GLYCOL 3350 17 G/17G
1 POWDER, FOR SOLUTION ORAL DAILY
Qty: 850 G | Refills: 3 | Status: ON HOLD | OUTPATIENT
Start: 2019-07-31 | End: 2020-06-10

## 2019-07-31 ASSESSMENT — PAIN SCALES - GENERAL: PAINLEVEL: SEVERE PAIN (7)

## 2019-07-31 ASSESSMENT — MIFFLIN-ST. JEOR: SCORE: 1885.28

## 2019-07-31 NOTE — PATIENT INSTRUCTIONS
Take miralax daily for constipation  Drink lots of water   Follow up with us in approximately 2 weeks  Return urgently if any change in symptoms.    Follow up as recommended per INR nurses   Return urgently if any change in symptoms like increasing pain, fever, vomiting, or other change in symptoms

## 2019-07-31 NOTE — PROGRESS NOTES
ANTICOAGULATION FOLLOW-UP CLINIC VISIT    Patient Name:  Hilaria Bonner  Date:  2019  Contact Type:  Face to Face    SUBJECTIVE:  Patient Findings     Positives:   Change in health (Patient seen in office today due to possible UTI symptoms - no Abx prescribed for patient at this time.)        Clinical Outcomes     Negatives:   Major bleeding event, Thromboembolic event, Anticoagulation-related hospital admission, Anticoagulation-related ED visit, Anticoagulation-related fatality           OBJECTIVE    INR   Date Value Ref Range Status   2019 2.50 (H) 0.86 - 1.14 Final       ASSESSMENT / PLAN  INR assessment THER    Recheck INR In: 5 DAYS    [facility]      Anticoagulation Summary  As of 2019    INR goal:   2.0-3.0   TTR:   42.6 % (1.7 mo)   INR used for dosin.50 (2019)   Warfarin maintenance plan:   12 mg (4 mg x 3) every day   Full warfarin instructions:   : 14 mg; : 14 mg; Otherwise 12 mg every day   Weekly warfarin total:   84 mg   Plan last modified:   Kait Manriquez RN (2019)   Next INR check:   2019   Target end date:            Anticoagulation Episode Summary     INR check location:   Anticoagulation Clinic    Preferred lab:       Send INR reminders to:   BELIA BRADEN    Comments:         Anticoagulation Care Providers     Provider Role Specialty Phone number    Crissy Madrigal PA-C Referring [facility] [phone]            See the Encounter Report to view Anticoagulation Flowsheet and Dosing Calendar (Go to Encounters tab in chart review, and find the Anticoagulation Therapy Visit)    Will continue weekly warfarin dosing of 88 mg. Patient seen in clinic with provider today for possible UTI symptoms - Crissy Madrigal advising that she will not prescribe an abx at this time. Patient's home care nurse will see patient next 19 for home visit - Hilaria states she will communicate her warfarin dosing and recheck  date with her home care nurse and decline writer calling.   Denies any changes to medications or other diet issues. Denies any missed doses or extra doses. Denies bleeding, bruising, or blood clots. Verbalizes understanding of dosing and recheck date.   Patient is aware if signs of clotting (pain, tenderness, swelling, color change in leg or arm, SOB) and bleeding occur (blood in stool, urine, large bruising, bleeding gums, nosebleeds) to have INR check sooner. If sx severe report to ER or concerned for stroke call 911. If general questions or concerns arise, call clinic.       Kierra Cottrell, JONATHANN, RN, PHN

## 2019-07-31 NOTE — TELEPHONE ENCOUNTER
Please see anticoagulation encounter from July 31, 2019 for further information.    Kierra Cottrell RN, BSN

## 2019-07-31 NOTE — PROGRESS NOTES
Subjective     Hilaria Bonner is a 28 year old female who presents to clinic today for the following health issues:    HPI   URINARY TRACT SYMPTOMS  Onset: Saturday    Description:   Painful urination (Dysuria): YES  Blood in urine (Hematuria): no   Delay in urine (Hesitency): YES    Intensity: moderate    Progression of Symptoms:  worsening    Accompanying Signs & Symptoms:  Fever/chills: no   Flank pain YES- right side  Nausea and vomiting: no   Any vaginal symptoms: none  Abdominal/Pelvic Pain: no     History:   History of frequent UTI's: YES  History of kidney stones: no   Sexually Active: YES  Possibility of pregnancy: No    Precipitating factors:   None    Therapies Tried and outcome:  Increase fluid intake    Patient known to me with history of massive PEand subsequent cardiac arrest on coumadin and has been therapeutic  Bladder symptoms for 4 days.  Has urgency and burns and when does urinate has small amounts.  Urine is not changed in color.  Uses depo for contraception  Not constant side pain.  Right upper quadrant. Not worse after meals.  Still has numbness in legs.  Afraid that she is going to go into cardiac arrest again  Still having chest pain- bilateral anterior chest.  Sometimes tightens up and loosens up for day or two then tightens up again.  One day woke up and tight but did not go to emergency department as advised.  Didn't feel like having heart attack.  Just like tighthness that she has been having continuously since hospitalizatoin   Bowel movement once every few days but urge to go but not coming out.          Patient Active Problem List   Diagnosis     Morbid obesity (H)     Vitamin D deficiency     Elevated parathyroid hormone     Encounter for smoking cessation counseling     Menorrhagia with irregular cycle     Morbid (severe) obesity due to excess calories (H)     Pulmonary embolism with infarction (H)     Past Surgical History:   Procedure Laterality Date     GYN SURGERY      2  C-sections     KNEE SURGERY  most recently - [id number]    3 surgeries in [place]     LAPAROSCOPIC GASTRIC SLEEVE N/A 3/26/2019    Procedure: Laparoscopic Sleeve Gastrectomy;  Surgeon: Luan Lopez MD;  Location: [facility] OR     ORTHOPEDIC SURGERY      2 knee meniscus surgery       Social History     Tobacco Use     Smoking status: Former Smoker     Years: 1.00     Start date: 2016     Last attempt to quit: 2018     Years since quittin.5     Smokeless tobacco: Never Used   Substance Use Topics     Alcohol use: Not Currently     Alcohol/week: 0.0 oz     Comment: occasional     Family History   Problem Relation Age of Onset     Diabetes Father      Hypertension Father      Breast Cancer Sister 26        surgery only     Cancer Sister      Coronary Artery Disease Other         paternal aunt     Thyroid Disease Other         neice     Coronary Artery Disease Other      Cerebrovascular Disease Other      Breast Cancer Sister      Thyroid Disease Other      Cerebrovascular Disease No family hx of          Current Outpatient Medications   Medication Sig Dispense Refill     acetaminophen (TYLENOL) 325 MG tablet Take 2 tablets (650 mg) by mouth every 4 hours as needed for other (For optimal non-opioid multimodal pain management to improve pain control and physical function.) 100 tablet 0     B Complex Vitamins (VITAMIN-B COMPLEX) TABS Take 1 tablet by mouth       Calcium Citrate-Vitamin D 500-500 MG-UNIT CHEW Take 1 chew tab by mouth 2 times daily 180 tablet 3     childrens multivitamin w/iron (FLINTSTONES COMPLETE) 60 MG chewable tablet Take 1 chew tab by mouth daily       Cyanocobalamin (B-12) 500 MCG SUBL Place 1 tablet under the tongue daily 90 tablet 3     cyanocobalamin (VITAMIN B-12) 1000 MCG tablet Take 1,000 mcg by mouth       ferrous sulfate (FEROSUL) 325 (65 Fe) MG tablet Take 325 mg by mouth       fluticasone (FLONASE) 50 MCG/ACT nasal spray 2 sprays       folic acid (FOLVITE) 1 MG tablet         gabapentin (NEURONTIN) 300 MG capsule Take 1 capsule (300 mg) by mouth nightly as needed 90 capsule 1     magic mouthwash (FIRST-MOUTHWASH BLM) compounding kit Take 30 mLs by mouth every 6 hours as needed for mouth sores 900 mL 2     Multiple Vitamins-Minerals (MULTIVITAMIN ADULTS PO) Take 1 tablet by mouth daily       omeprazole (PRILOSEC) 20 MG DR capsule Take 1 capsule (20 mg) by mouth 2 times daily 180 capsule 1     ondansetron (ZOFRAN-ODT) 4 MG ODT tab Take 1 tablet (4 mg) by mouth every 6 hours as needed for nausea or vomiting 12 tablet 1     polyethylene glycol (MIRALAX/GLYCOLAX) powder Take 17 g (1 capful) by mouth daily 850 g 3     topiramate (TOPAMAX) 25 MG tablet Take 3 tablets (75 mg) by mouth daily 90 tablet 3     ursodiol (ACTIGALL) 300 MG capsule Take 1 capsule (300 mg) by mouth 2 times daily 60 capsule 4     varenicline (CHANTIX AMADO) 0.5 MG X 11 & 1 MG X 42 tablet Take 0.5 mg tab daily for 3 days, THEN 0.5 mg tab twice daily for 4 days, THEN 1 mg twice daily. 53 tablet 0     vitamin  s/Minerals TABS Take 1 tablet by mouth       vitamin B complex with vitamin C (VITAMIN  B COMPLEX) tablet Take 1 tablet by mouth daily 90 tablet 1     vitamin C (ASCORBIC ACID) 1000 MG TABS Take 1,000 mg by mouth       vitamin D3 (CHOLECALCIFEROL) 2000 units tablet Take 1 tablet by mouth daily 90 tablet 1     warfarin (COUMADIN) 4 MG tablet Take 3 tablets by mouth daily or as directed by [facility] nurse. 270 tablet 0     CHANTIX STARTING MONTH AMADO 0.5 MG X 11 & 1 MG X 42 tablet        enoxaparin (LOVENOX) 120 MG/0.8ML syringe Inject 0.8 mLs (120 mg) Subcutaneous every 12 hours (Patient not taking: Reported on 7/31/2019) 10 Syringe 1     kcl 20 meq daily        BP Readings from Last 3 Encounters:   07/31/19 128/84   06/20/19 130/84   06/14/19 (!) 140/97    Wt Readings from Last 3 Encounters:   07/31/19 113.9 kg (251 lb)   06/20/19 115.7 kg (255 lb)   06/14/19 118 kg (260 lb 3.2 oz)                      Reviewed and updated as  "needed this visit by Provider  Tobacco  Allergies  Meds  Problems  Med Hx  Surg Hx  Fam Hx  Soc Hx          Review of Systems   ROS COMP: Constitutional, HEENT, cardiovascular, pulmonary, gi and gu systems are negative, except as otherwise noted.      Objective    /84 (BP Location: Right arm, Patient Position: Chair, Cuff Size: Adult Large)   Pulse 82   Temp 98.4  F (36.9  C) (Oral)   Resp 18   Ht 1.676 m (5' 6\")   Wt 113.9 kg (251 lb)   LMP  (Exact Date)   SpO2 99%   Breastfeeding? No   BMI 40.51 kg/m    Body mass index is 40.51 kg/m .  Physical Exam   GENERAL: alert, no distress and obese  NECK: no adenopathy, no asymmetry, masses, or scars and thyroid normal to palpation  RESP: lungs clear to auscultation - no rales, rhonchi or wheezes  CV: regular rate and rhythm, normal S1 S2, no S3 or S4, no murmur, click or rub, no peripheral edema and peripheral pulses strong  ABDOMEN: soft, nontender, no hepatosplenomegaly, no masses and bowel sounds normal  MS: no gross musculoskeletal defects noted, no edema    Diagnostic Test Results:  Results for orders placed or performed in visit on 07/31/19   Basic metabolic panel   Result Value Ref Range    Sodium 146 (H) 133 - 144 mmol/L    Potassium 3.0 (L) 3.4 - 5.3 mmol/L    Chloride 111 (H) 94 - 109 mmol/L    Carbon Dioxide 27 20 - 32 mmol/L    Anion Gap 8 3 - 14 mmol/L    Glucose 89 70 - 99 mg/dL    Urea Nitrogen 4 (L) 7 - 30 mg/dL    Creatinine 0.64 0.52 - 1.04 mg/dL    GFR Estimate >90 >60 mL/min/[1.73_m2]    GFR Estimate If Black >90 >60 mL/min/[1.73_m2]    Calcium 8.5 8.5 - 10.1 mg/dL   *UA reflex to Microscopic and Culture ([place] [facility] (except [place] and [place])   Result Value Ref Range    Color Urine Yellow     Appearance Urine Clear     Glucose Urine Negative NEG^Negative mg/dL    Bilirubin Urine Negative NEG^Negative    Ketones Urine Negative NEG^Negative mg/dL    Specific Gravity Urine 1.020 1.003 - 1.035    Blood Urine Trace " (A) NEG^Negative    pH Urine 7.0 5.0 - 7.0 pH    Protein Albumin Urine 30 (A) NEG^Negative mg/dL    Urobilinogen Urine 0.2 0.2 - 1.0 EU/dL    Nitrite Urine Negative NEG^Negative    Leukocyte Esterase Urine Negative NEG^Negative    Source Urine    INR   Result Value Ref Range    INR 2.50 (H) 0.86 - 1.14   Urine Microscopic   Result Value Ref Range    WBC Urine 0 - 5 OTO5^0 - 5 /HPF    RBC Urine O - 2 OTO2^O - 2 /HPF    Hyaline Casts O - 2 OTO2^O - 2 /LPF    Squamous Epithelial /LPF Urine Moderate (A) FEW^Few /LPF    Renal Tub Epi Few (A) NEG^Negative /HPF    Bacteria Urine Few (A) NEG^Negative /HPF    Mucous Urine Present (A) NEG^Negative /LPF   TSH with free T4 reflex   Result Value Ref Range    TSH 0.44 0.40 - 4.00 mU/L   Urine Culture Aerobic Bacterial   Result Value Ref Range    Specimen Description Urine     Culture Micro       10,000 to 50,000 colonies/mL  mixed urogenital libby  Susceptibility testing not routinely done             Assessment & Plan     1. Hypokalemia  See phone call to patient - states has been taking 20 meq daily- increase to 40 meq daily and recheck in one week   - Basic metabolic panel  - Urine Microscopic  - Basic metabolic panel; Future    2. Dysuria  Normal urinalysis -reassured.        - *UA reflex to Microscopic and Culture ([place] [facility] (except [place] and [place])  - Urine Culture Aerobic Bacterial    3. Pulmonary embolism with infarction (H)  INR followed by anticoagulation team at [place]   - INR    4. Tobacco abuse  Restart chantix - has been helpful in past   - varenicline (CHANTIX AMADO) 0.5 MG X 11 & 1 MG X 42 tablet; Take 0.5 mg tab daily for 3 days, THEN 0.5 mg tab twice daily for 4 days, THEN 1 mg twice daily.  Dispense: 53 tablet; Refill: 0    5. Constipation, unspecified constipation type  Trial of miralax.  Rule out thyroid disease   - polyethylene glycol (MIRALAX/GLYCOLAX) powder; Take 17 g (1 capful) by mouth daily  Dispense: 850 g; Refill: 3  -  TSH with free T4 reflex         Patient Instructions   Take miralax daily for constipation  Drink lots of water   Follow up with us in approximately 2 weeks  Return urgently if any change in symptoms.    Follow up as recommended per INR nurses   Return urgently if any change in symptoms like increasing pain, fever, vomiting, or other change in symptoms       Return in about 2 weeks (around 8/14/2019), or if symptoms worsen or fail to improve.    Crissy Madrigal PA-C  [facility]

## 2019-08-01 LAB
BACTERIA SPEC CULT: NORMAL
SPECIMEN SOURCE: NORMAL

## 2019-08-01 NOTE — TELEPHONE ENCOUNTER
This writer attempted to contact [facility] on 08/01/19      Reason for call results and left message.      If patient calls back:   Registered Nurse called. Follow Triage Call workflow        Tiffanie Lr RN

## 2019-08-01 NOTE — TELEPHONE ENCOUNTER
Please call and advise that potassium was low!  Should be taking 20 meq potassium daily. If she is taking this we need to increase to 40 meq daily- if she has not been taking please advise to take 20 meq daily.  Schedule lab only appointment in approximately one week   Kidney function and thyroid function were normal.   Let me know if needs new prescription written

## 2019-08-01 NOTE — RESULT ENCOUNTER NOTE
See phone note 7/31/19   Also sent [facility] message    Dear Hilaria  You potassium was very low.  According to our records you should be taking 20 meq potassium daily.  Let me know if this prescription has run out and if you are taking the med as prescribed.   If you are taking the 20 meq we need to bump you up to 40 meq and we should recheck levels in one week.  Your kidney function and thyroid function were normal.   Please call or MyChart my office with any questions or concerns.    Crissy Madrigal, PAC

## 2019-08-02 RX ORDER — POTASSIUM CHLORIDE 1500 MG/1
20 TABLET, EXTENDED RELEASE ORAL 2 TIMES DAILY
Qty: 60 TABLET | Refills: 1 | Status: SHIPPED | OUTPATIENT
Start: 2019-08-02 | End: 2019-09-01

## 2019-08-02 NOTE — TELEPHONE ENCOUNTER
Update potassium Rx to 20 mEq BID. Please sign new Rx.    Patient informed of the labs results of potassium. She will be waiting for the updated Rx. Patient had questions about INR and green intake. Patient questions answered.    Kait Manriquez RN, [facility]

## 2019-08-02 NOTE — TELEPHONE ENCOUNTER
.Patient  returned call    Best number to reach caller: Home number on file    [phone]  Is it ok to leave a detailed message: sending to RN line

## 2019-08-05 ENCOUNTER — ANTICOAGULATION THERAPY VISIT (OUTPATIENT)
Dept: NURSING | Facility: CLINIC | Age: 29
End: 2019-08-05
Payer: COMMERCIAL

## 2019-08-05 ENCOUNTER — TELEPHONE (OUTPATIENT)
Dept: FAMILY MEDICINE | Facility: CLINIC | Age: 29
End: 2019-08-05

## 2019-08-05 LAB — INR PPP: 4.6 (ref 0.9–1.1)

## 2019-08-05 NOTE — TELEPHONE ENCOUNTER
Reason for Call:  INR    Who is calling?  Home Care[facility]    Phone number:  [phone]    Name of caller: Moon    INR Value:  4.6 by tellotick    Are there any other concerns:  No    Can we leave a detailed message on this number? YES      Call taken on 8/5/2019 at 1:41 PM by Reno Adrian

## 2019-08-05 NOTE — TELEPHONE ENCOUNTER
Please see the August 5, 2019 Anticoagulation encounter for further information.   Kait Manriquez RN, [facility]

## 2019-08-05 NOTE — PROGRESS NOTES
ANTICOAGULATION FOLLOW-UP CLINIC VISIT    Patient Name:  Hilaria Bonner  Date:  2019  Contact Type:  Telephone    SUBJECTIVE:         OBJECTIVE    INR   Date Value Ref Range Status   2019 4.6 (A) 0.9 - 1.1 Final       ASSESSMENT / PLAN  INR assessment SUPRA    Recheck INR In: 2 DAYS    [facility]R Location [facility]      Anticoagulation Summary  As of 2019    INR goal:   2.0-3.0   TTR:   41.2 % (1.9 mo)   INR used for dosin.6! (2019)   Warfarin maintenance plan:   12 mg (4 mg x 3) every day   Full warfarin instructions:   : Hold; Otherwise 12 mg every day   Weekly warfarin total:   84 mg   Plan last modified:   Kait Manriquez RN (2019)   Next INR check:   2019   Target end date:            Anticoagulation Episode Summary     INR check location:   Anticoagulation Clinic    Preferred lab:       Send INR reminders to:   BELIA BRADEN    Comments:         Anticoagulation Care Providers     Provider Role Specialty Phone number    ScottstacyCrissy, PA-C Referring [facility] [phone]            See the Encounter Report to view Anticoagulation Flowsheet and Dosing Calendar (Go to Encounters tab in chart review, and find the Anticoagulation Therapy Visit)    Dosage adjustment made based on physician directed care plan. Decreased per protocol with a hold of about 15% with a recheck on Wednesday.    Patient states negative for signs and symptoms of bleeding or blood clots, changes in medication, changes in diet, any signs of infection or antibiotic use, anything new OTC or herbal medications, any missed or extra doses of the warfarin.    Patient informed of the symptoms to be seen for either by INR nurse or ER.      Kait Manriquez, TEN

## 2019-08-07 ENCOUNTER — TELEPHONE (OUTPATIENT)
Dept: FAMILY MEDICINE | Facility: CLINIC | Age: 29
End: 2019-08-07

## 2019-08-07 ENCOUNTER — ANTICOAGULATION THERAPY VISIT (OUTPATIENT)
Dept: NURSING | Facility: CLINIC | Age: 29
End: 2019-08-07
Payer: COMMERCIAL

## 2019-08-07 LAB — INR PPP: 2.1 (ref 0.9–1.1)

## 2019-08-07 NOTE — TELEPHONE ENCOUNTER
Reason for call:  INR   Who is calling? Caregiver    Phone number: [phone]      Name of caller: kofi    INR Value: 2.1 - finger stick    Are there any other concerns: No  Route this INR message to [facility] # [id number]    Phone number to reach patient:  Home number on file [phone] (home)    Best Time:  eugenio    Can we leave a detailed message on this number?  YES

## 2019-08-07 NOTE — PROGRESS NOTES
ANTICOAGULATION FOLLOW-UP CLINIC VISIT    Patient Name:  Hilaria Bonner  Date:  2019  Contact Type:  Telephone/ Moon CAAL    SUBJECTIVE:  Patient Findings         Clinical Outcomes     Negatives:   Major bleeding event, Thromboembolic event, Anticoagulation-related hospital admission, Anticoagulation-related ED visit, Anticoagulation-related fatality           OBJECTIVE    INR   Date Value Ref Range Status   2019 2.1 (A) 0.9 - 1.1 Final       ASSESSMENT / PLAN  INR assessment THER    Recheck INR In: 5 DAYS    INR Location Clinic      Anticoagulation Summary  As of 2019    INR goal:   2.0-3.0   TTR:   41.0 % (2 mo)   INR used for dosin.1 (2019)   Warfarin maintenance plan:   12 mg (4 mg x 3) every day   Full warfarin instructions:   : 14 mg; : 14 mg; Otherwise 12 mg every day   Weekly warfarin total:   84 mg   Plan last modified:   Kait Manriquez RN (2019)   Next INR check:   2019   Target end date:            Anticoagulation Episode Summary     INR check location:   Anticoagulation Clinic    Preferred lab:       Send INR reminders to:   BELIA BRADEN    Comments:         Anticoagulation Care Providers     Provider Role Specialty Phone number    Crissy Madrigal PA-C Referring [facility] [phone]            See the Encounter Report to view Anticoagulation Flowsheet and Dosing Calendar (Go to Encounters tab in chart review, and find the Anticoagulation Therapy Visit)    Dosage adjustment made based on physician directed care plan. Writer called and received the confidential voicemail of Moon CAAL. Per telephone message left with the call center she said there were no concerns to note with patient. Writer left a detailed voicemail relaying the warfarin dosing on record for the patient and to call if patient did NOT take as directed since last INR recheck. Also advised on a return call to clinic if there were any changes to medications, antibiotics,  diet, bleeding, bruising, or signs or symptoms of blood clots. Writer left warfarin dosing instructions and an INR recheck date for patient on voicemail. Again, writer instructed Moon to return call with any concerns or updates and provided clinic call back number.    Continue current warfarin dosing and recheck INR in 5 days via homecare.    Kierra Cottrell, JONATHANN, RN, PHN

## 2019-08-07 NOTE — TELEPHONE ENCOUNTER
Please see anticoagulation encounter from August 7, 2019 for further information.    Kierra Cottrell RN, BSN

## 2019-08-08 ENCOUNTER — MEDICAL CORRESPONDENCE (OUTPATIENT)
Dept: HEALTH INFORMATION MANAGEMENT | Facility: CLINIC | Age: 29
End: 2019-08-08

## 2019-08-12 ENCOUNTER — TELEPHONE (OUTPATIENT)
Dept: FAMILY MEDICINE | Facility: CLINIC | Age: 29
End: 2019-08-12
Payer: COMMERCIAL

## 2019-08-12 ENCOUNTER — NURSE TRIAGE (OUTPATIENT)
Dept: NURSING | Facility: CLINIC | Age: 29
End: 2019-08-12

## 2019-08-12 LAB — INR PPP: 1.1

## 2019-08-13 ENCOUNTER — TELEPHONE (OUTPATIENT)
Dept: FAMILY MEDICINE | Facility: CLINIC | Age: 29
End: 2019-08-13

## 2019-08-13 NOTE — TELEPHONE ENCOUNTER
Reason for Call:  Other     Detailed comments: Emilie received an order after hours for dosing for INR, which she was give a dose of 12 mg. Emilie wants to know when to have it rechecked?    Phone Number Patient can be reached at: Other phone number:  [phone]    Best Time: any    Can we leave a detailed message on this number? YES    Call taken on 8/13/2019 at 3:53 PM by Carina Noel

## 2019-08-13 NOTE — ADDENDUM NOTE
Addended by: TRUDI GARZA on: 8/13/2019 04:05 PM     Modules accepted: Level of Service, SmartSet

## 2019-08-13 NOTE — TELEPHONE ENCOUNTER
On call note INR- 1.1 today per home health nurse. Was 4.5 a few days ago. Should be taking 12 mg a day. Nurse checked compliance with patient who sets up her own medication and she states she is taking her pills. She did eat a salad earlier. Recommend follow up with INR clinic and nurse to set up medications for patient. Will not change dose for now and she will take her 12 mg today. She also has been seeing multiple providers and should have an assigned primary care provider. High risk history.  Ariana Troy MD

## 2019-08-13 NOTE — TELEPHONE ENCOUNTER
Please see the August 13, 2019 Anticoagulation encounter for further information.      Ayana Tay RN, BSN, PHN, [facility]

## 2019-08-13 NOTE — TELEPHONE ENCOUNTER
ANTICOAGULATION FOLLOW-UP CLINIC VISIT    Patient Name:  Hilaria Bonner  Date:  2019  Contact Type:  Telephone/ [phone]    SUBJECTIVE:  Patient Findings     Comments:   Patient denies any identifiable changes that caused the sub therapeutic INR.           Clinical Outcomes     Comments:   Patient denies any identifiable changes that caused the sub therapeutic INR.              OBJECTIVE    INR   Date Value Ref Range Status   2019 1.1  Final       ASSESSMENT / PLAN      Anticoagulation Summary  As of 2019    INR goal:   2.0-3.0   TTR:   38.6 % (2.1 mo)   INR used for dosin.1! (2019)   Warfarin maintenance plan:   12 mg (4 mg x 3) every day   Full warfarin instructions:   12 mg every day   Weekly warfarin total:   84 mg   No change documented:   Ayana Tay RN   Plan last modified:   Kait Manriquez RN (2019)   Next INR check:   2019   Target end date:            Anticoagulation Episode Summary     INR check location:   Anticoagulation Clinic    Preferred lab:       Send INR reminders to:   BELIA BRADEN    Comments:         Anticoagulation Care Providers     Provider Role Specialty Phone number    Crissy Madrigal PA-C Referring Family Practice [phone]            See the Encounter Report to view Anticoagulation Flowsheet and Dosing Calendar (Go to Encounters tab in chart review, and find the Anticoagulation Therapy Visit)    Left message for Moon with recheck date of 19 as on-call provider gave dosing instructions 19 evening. Asked Moon to call back to report any missed doses, falls, signs and symptoms of bleeding or clotting, any changes in health, medication, or diet. Asked Moon to call back with any questions or concerns.        Ayana Tay RN, BSN, PHN

## 2019-08-13 NOTE — TELEPHONE ENCOUNTER
Nurse calling from home health care [facility]. INR done tonight. 1.1 fingerstick. Current dose Wed and Fri 14mg AOD 12mg.     8/7 INR 2.1 Wed and Fri 14mg AOD 12mg.   8/5 4.6 hold today 12 mg aod  7/31 2.5 14 mg fri and 12 mg aod    Spoke with Dr Troy she will call back home health nurse at [phone]

## 2019-08-14 ENCOUNTER — TELEPHONE (OUTPATIENT)
Dept: FAMILY MEDICINE | Facility: CLINIC | Age: 29
End: 2019-08-14

## 2019-08-14 ENCOUNTER — ANTICOAGULATION THERAPY VISIT (OUTPATIENT)
Dept: FAMILY MEDICINE | Facility: CLINIC | Age: 29
End: 2019-08-14
Payer: COMMERCIAL

## 2019-08-14 DIAGNOSIS — I26.99 PULMONARY EMBOLISM WITH INFARCTION (H): ICD-10-CM

## 2019-08-14 DIAGNOSIS — I26.99 ACUTE MASSIVE PULMONARY EMBOLISM (H): ICD-10-CM

## 2019-08-14 LAB — INR PPP: 1.2 (ref 0.9–1.1)

## 2019-08-14 PROCEDURE — 99207 ZZC NO CHARGE NURSE ONLY: CPT | Performed by: PHYSICIAN ASSISTANT

## 2019-08-14 RX ORDER — WARFARIN SODIUM 4 MG/1
TABLET ORAL
Qty: 270 TABLET | Refills: 0 | Status: SHIPPED | OUTPATIENT
Start: 2019-08-14 | End: 2019-10-14 | Stop reason: ALTCHOICE

## 2019-08-14 RX ORDER — WARFARIN SODIUM 4 MG/1
TABLET ORAL
Qty: 270 TABLET | Refills: 0 | Status: CANCELLED | OUTPATIENT
Start: 2019-08-14

## 2019-08-14 NOTE — PROGRESS NOTES
ANTICOAGULATION FOLLOW-UP CLINIC VISIT    Patient Name:  Hilaria Bonner  Date:  2019  Contact Type:  Telephone/ Hilaria - Writer also tried calling Moon CAAL, but she did not answer her phone.    SUBJECTIVE:  Patient Findings         Clinical Outcomes     Negatives:   Major bleeding event, Thromboembolic event, Anticoagulation-related hospital admission, Anticoagulation-related ED visit, Anticoagulation-related fatality           OBJECTIVE    INR   Date Value Ref Range Status   2019 1.2 (A) 0.9 - 1.1 Final       ASSESSMENT / PLAN  INR assessment SUB    Recheck INR In: 5 DAYS    INR Location Homecare INR      Anticoagulation Summary  As of 2019    INR goal:   2.0-3.0   TTR:   37.4 % (2.2 mo)   INR used for dosin.2! (2019)   Warfarin maintenance plan:   12 mg (4 mg x 3) every day   Full warfarin instructions:   12 mg every day   Weekly warfarin total:   84 mg   No change documented:   Kierra Cottrell RN   Plan last modified:   Kait Manriquez RN (2019)   Next INR check:   2019   Target end date:            Anticoagulation Episode Summary     INR check location:   Anticoagulation Clinic    Preferred lab:       Send INR reminders to:   BELIA BRADEN    Comments:         Anticoagulation Care Providers     Provider Role Specialty Phone number    Crissy Madrigal PA-C Referring Family Practice [phone]            See the Encounter Report to view Anticoagulation Flowsheet and Dosing Calendar (Go to Encounters tab in chart review, and find the Anticoagulation Therapy Visit)    Dosage adjustment made based on physician directed care plan.    Per Radha Ríos RPh, patient is to take warfarin 12 mg daily and start Lovenox injections of 0.8 mL every 12 hours. There are some concerns for compliance, so Radha Ríos RPh states she does not want to do any warfarin adjustments, in case patient has not been taking her warfarin as directed, despite patient saying  she has been taking it.     Patient's family member passed away recently so she will be leaving out of town for a  tomorrow and will first be available for home care to check INR, next 19. Writer educated patient on signs and symptoms of bleeding, bruising, blood clots, swelling, or sudden weakness or sudden shortness of breath that would warrant she call 911 for medical attention while out of town, patient states understanding.     Reviewed warfarin and Lovenox directions with patient - she states understanding. Will send in a Retrevo message too.     Writer attempted to contacted Moon CAAL with home care, but her voicemail box is full, so unable to leave a detailed message with these orders.     Kierra Cottrell, BSN, RN, PHN

## 2019-08-14 NOTE — TELEPHONE ENCOUNTER
I    .Reason for Call:  INR    Who is calling?  Home Care: [facility]    Phone number:  [phone]    Fax number:  n/a    Name of caller: Moon    INR Value:  1.2 via finger stick    Are there any other concerns:  No  Needs refills on warfarin /lovenox - separate message sent    Can we leave a detailed message on this number? YES    Phone number patient can be reached at:  phone number:  [phone]      Call taken on 8/14/2019 at 1:28 PM by Argentina Maldonado

## 2019-08-14 NOTE — TELEPHONE ENCOUNTER
"Requested Prescriptions   Pending Prescriptions Disp Refills     warfarin (COUMADIN) 4 MG tablet 270 tablet 0     Sig: Take 3 tablets by mouth daily or as directed by [facility] nurse.       Vitamin K Antagonists Failed - 8/14/2019  1:33 PM        Failed - INR is within goal in the past 6 weeks     Confirm INR is within goal in the past 6 weeks.     Recent Labs   Lab Test 08/14/19   INR 1.2*                       Passed - Recent (12 mo) or future (30 days) visit within the authorizing provider's specialty     Patient had office visit in the last 12 months or has a visit in the next 30 days with authorizing provider or within the authorizing provider's specialty.  See \"Patient Info\" tab in inbasket, or \"Choose Columns\" in Meds & Orders section of the refill encounter.              Passed - Medication is active on med list        Passed - Patient is 18 years of age or older        Passed - Patient is not pregnant        Passed - No positive pregnancy on file in past 12 months        enoxaparin (LOVENOX) 120 MG/0.8ML syringe 10 Syringe 1     Sig: Inject 0.8 mLs (120 mg) Subcutaneous every 12 hours       There is no refill protocol information for this order        warfarin (COUMADIN) 4 MG tablet  Last Written Prescription Date:  7/8/19  Last Fill Quantity: 270,  # refills: 0   Last office visit: 7/31/2019 with prescribing provider:  Crissy Madrigal   Future Office Visit:      enoxaparin (LOVENOX) 120 MG/0.8ML syringe  Last Written Prescription Date:  7/5/19  Last Fill Quantity: 10,  # refills: 1   Last office visit: 7/31/2019 with prescribing provider:  Crissy Madrigal   Future Office Visit:      "

## 2019-08-14 NOTE — TELEPHONE ENCOUNTER
Called and spoke with patient regarding INR and Lovenox orders. Patient states understanding.     Writer attempted to contact Moon CAAL with Home Care and her voicemail box is full, so writer unable to leave a message.    This writer attempted to contact Moon CAAL on 08/14/19      Reason for call INR ORDERS and unable to leave message.      If patient calls back:   INR Registered Nurse called. Inform patient that someone from the INR group will contact them, document that pt called and route to [facility] [[id number]]        Kierra Cottrell, BSN, RN, PHN

## 2019-08-15 NOTE — TELEPHONE ENCOUNTER
Called and spoke with Moon CAAL. Relayed warfarin and Lovenox directions/orders from yesterday 8/14/19, along with next recheck date for INR on Monday. She states understanding.    Kierra Cottrell, JONATHANN, RN, PHN

## 2019-08-19 ENCOUNTER — TELEPHONE (OUTPATIENT)
Dept: FAMILY MEDICINE | Facility: CLINIC | Age: 29
End: 2019-08-19

## 2019-08-19 NOTE — TELEPHONE ENCOUNTER
Called patient and she notes that [facility] RN did not respond to her and thinks it will be Wednesday before this can get done.    Kait Manriquez RN, [facility]

## 2019-08-21 ENCOUNTER — TELEPHONE (OUTPATIENT)
Dept: FAMILY MEDICINE | Facility: CLINIC | Age: 29
End: 2019-08-21

## 2019-08-21 NOTE — TELEPHONE ENCOUNTER
This writer attempted to contact [facility] on 08/21/19      Reason for call INR done today? and left message.      If patient calls back:   INR Registered Nurse called. Inform patient that someone from the INR group will contact them, document that pt called and route to [facility] [[id number]]      Ayana Tay RN, BSN, PHN

## 2019-08-21 NOTE — TELEPHONE ENCOUNTER
[place] forms placed on Dr. Greenfield desk for completion.    Carolann ESTRELLA, Patient Care

## 2019-08-23 ENCOUNTER — TELEPHONE (OUTPATIENT)
Dept: FAMILY MEDICINE | Facility: CLINIC | Age: 29
End: 2019-08-23

## 2019-08-23 LAB — INR PPP: 3.3 (ref 0.9–1.1)

## 2019-08-23 NOTE — TELEPHONE ENCOUNTER
Reason for Call:  Other     Detailed comments: INR 3.3 by [email or web address]    Phone Number can be reached at: Lea CAAL [facility] [phone]    Best Time: any    Can we leave a detailed message on this number? YES    Call taken on 8/23/2019 at 4:19 PM by Jadyn Rick

## 2019-08-23 NOTE — TELEPHONE ENCOUNTER
This writer attempted to contact [facility] on 08/23/19      Reason for call INR done by home care? and left message.      If patient calls back:   Registered Nurse called. Follow Triage Call workflow        Ayana Tay RN, BSN, PHN

## 2019-08-26 ENCOUNTER — ANTICOAGULATION THERAPY VISIT (OUTPATIENT)
Dept: NURSING | Facility: CLINIC | Age: 29
End: 2019-08-26
Payer: COMMERCIAL

## 2019-08-26 ENCOUNTER — TELEPHONE (OUTPATIENT)
Dept: FAMILY MEDICINE | Facility: CLINIC | Age: 29
End: 2019-08-26

## 2019-08-26 LAB — INR PPP: 1.7 (ref 0.9–1.1)

## 2019-08-26 PROCEDURE — 99207 ZZC NO CHARGE NURSE ONLY: CPT | Performed by: PHYSICIAN ASSISTANT

## 2019-08-26 NOTE — PROGRESS NOTES
ANTICOAGULATION FOLLOW-UP CLINIC VISIT    Patient Name:  Hilaria Bonner  Date:  2019  Contact Type:  Telephone/ Lea RN and patient    SUBJECTIVE:  Patient Findings         Clinical Outcomes     Negatives:   Major bleeding event, Thromboembolic event, Anticoagulation-related hospital admission, Anticoagulation-related ED visit, Anticoagulation-related fatality           OBJECTIVE    INR   Date Value Ref Range Status   2019 1.7 (A) 0.9 - 1.1 Final       ASSESSMENT / PLAN  INR assessment SUB    Recheck INR In: 4 DAYS    INR Location [facility] INR      Anticoagulation Summary  As of 2019    INR goal:   2.0-3.0   TTR:   39.6 % (2.6 mo)   INR used for dosin.7! (2019)   Warfarin maintenance plan:   12 mg (4 mg x 3) every day   Full warfarin instructions:   12 mg every day   Weekly warfarin total:   84 mg   No change documented:   Kait Manriquez RN   Plan last modified:   Kait Manriquez RN (2019)   Next INR check:   2019   Target end date:            Anticoagulation Episode Summary     INR check location:   Anticoagulation Clinic    Preferred lab:       Send INR reminders to:   BELIA BRADEN    Comments:         Anticoagulation Care Providers     Provider Role Specialty Phone number    Crissy Madrigal PA-C Referring [facility] [phone]            See the Encounter Report to view Anticoagulation Flowsheet and Dosing Calendar (Go to Encounters tab in chart review, and find the Anticoagulation Therapy Visit)    Dosage adjustment made based on physician directed care plan. Huddle with Radha Ríos [facility] and to keep the patient at 12 mg daily with recheck this week.    Patient states negative for signs and symptoms of bleeding or blood clots, changes in medication, changes in diet, any signs of infection or antibiotic use, anything new OTC or herbal medications, any missed or extra doses of the warfarin.    Patient informed of the symptoms to be seen for  either by INR nurse or ER.    Kait Manriquez RN

## 2019-08-26 NOTE — TELEPHONE ENCOUNTER
Please see the August 26, 2019 Anticoagulation encounter for further information.  Kait Manriquez RN, [facility]

## 2019-08-26 NOTE — TELEPHONE ENCOUNTER
Reason for call:  INR   Who is calling? Caregiver    Phone number: [phone]    Fax number: n/a    Name of caller: kofi    INR Value: 1.7 - fingerstick    Are there any other concerns: Yes: have not received instructions from Friday (INR 3.3 -fingerstick)   Route this INR message to [facility] # [id number]    Phone number to reach patient:  Home number on file [phone] (home)    Best Time:  any    Can we leave a detailed message on this number?  YES

## 2019-08-26 NOTE — TELEPHONE ENCOUNTER
Please see the August 26, 2019 Anticoagulation encounter for further information.  Kait Manriquez RN, [facility]

## 2019-08-29 ENCOUNTER — MEDICAL CORRESPONDENCE (OUTPATIENT)
Dept: HEALTH INFORMATION MANAGEMENT | Facility: CLINIC | Age: 29
End: 2019-08-29

## 2019-08-29 ENCOUNTER — TELEPHONE (OUTPATIENT)
Dept: FAMILY MEDICINE | Facility: CLINIC | Age: 29
End: 2019-08-29

## 2019-08-29 ENCOUNTER — TELEPHONE (OUTPATIENT)
Dept: FAMILY MEDICINE | Facility: CLINIC | Age: 29
End: 2019-08-29
Payer: COMMERCIAL

## 2019-08-29 LAB — INR PPP: 2.5

## 2019-08-29 NOTE — TELEPHONE ENCOUNTER
ANTICOAGULATION FOLLOW-UP CLINIC VISIT    Patient Name:  Hilaria Bonner  Date:  2019  Contact Type:  Telephone/ [phone]    SUBJECTIVE:  Patient Findings         Clinical Outcomes     Negatives:   Major bleeding event, Thromboembolic event, Anticoagulation-related hospital admission, Anticoagulation-related ED visit, Anticoagulation-related fatality           OBJECTIVE    INR   Date Value Ref Range Status   2019 2.5  Final       ASSESSMENT / PLAN      Anticoagulation Summary  As of 2019    INR goal:   2.0-3.0   TTR:   40.4 % (2.7 mo)   INR used for dosin.5 (2019)   Warfarin maintenance plan:   12 mg (4 mg x 3) every day   Full warfarin instructions:   12 mg every day   Weekly warfarin total:   84 mg   No change documented:   Ayana Tay RN   Plan last modified:   Kait Manriquez RN (2019)   Next INR check:   9/3/2019   Target end date:            Anticoagulation Episode Summary     INR check location:   Anticoagulation Clinic    Preferred lab:       Send INR reminders to:   BELIA BRADEN    Comments:         Anticoagulation Care Providers     Provider Role Specialty Phone number    Crissy Madrigal PA-C Referring [facility] [phone]            See the Encounter Report to view Anticoagulation Flowsheet and Dosing Calendar (Go to Encounters tab in chart review, and find the Anticoagulation Therapy Visit)    Left message for Moon with results and dosing recommendations. Asked Moon to call back to report any missed doses, falls, signs and symptoms of bleeding or clotting, any changes in health, medication, or diet. Asked Moon to call back with any questions or concerns. Patient is in therapeutic range so will continue at 12 mg daily with recheck in 5 days.      Ayana Tay RN, BSN, PHN

## 2019-08-29 NOTE — TELEPHONE ENCOUNTER
Reason for call:  INR   Who is calling? Caregiver    Phone number: [phone]    Fax number: n/a    Name of caller: kofi    INR Value: 2.5 finger stick    Are there any other concerns: No  Route this INR message to [facility] # [id number]    Phone number to reach patient:      Best Time:  any    Can we leave a detailed message on this number?  YES

## 2019-09-04 LAB — INR PPP: 2.6 (ref 0.9–1.1)

## 2019-09-05 ENCOUNTER — TELEPHONE (OUTPATIENT)
Dept: FAMILY MEDICINE | Facility: CLINIC | Age: 29
End: 2019-09-05

## 2019-09-05 ENCOUNTER — ANTICOAGULATION THERAPY VISIT (OUTPATIENT)
Dept: NURSING | Facility: CLINIC | Age: 29
End: 2019-09-05
Payer: COMMERCIAL

## 2019-09-05 PROCEDURE — 99207 ZZC NO CHARGE NURSE ONLY: CPT | Performed by: PHYSICIAN ASSISTANT

## 2019-09-05 NOTE — PROGRESS NOTES
ANTICOAGULATION FOLLOW-UP CLINIC VISIT    Patient Name:  Hilaria Bonner  Date:  2019  Contact Type:  Telephone/ Bailey CAAL [facility] [phone] , called patient also, voicemail left for both people    SUBJECTIVE:         OBJECTIVE    INR   Date Value Ref Range Status   2019 2.6 (A) 0.9 - 1.1 Final       ASSESSMENT / PLAN  INR assessment THER    Recheck INR In: 1 WEEK    INR Location Clinic      Anticoagulation Summary  As of 2019    INR goal:   2.0-3.0   TTR:   44.5 % (2.9 mo)   INR used for dosin.6 (2019)   Warfarin maintenance plan:   12 mg (4 mg x 3) every day   Full warfarin instructions:   12 mg every day   Weekly warfarin total:   84 mg   No change documented:   Kait Manriquez RN   Plan last modified:   Kait Manriquez RN (2019)   Next INR check:   2019   Target end date:            Anticoagulation Episode Summary     INR check location:   Anticoagulation Clinic    Preferred lab:       Send INR reminders to:   BELIA BRADEN    Comments:         Anticoagulation Care Providers     Provider Role Specialty Phone number    Crissy Madrigal PA-C Referring Family Practice [phone]            See the Encounter Report to view Anticoagulation Flowsheet and Dosing Calendar (Go to Encounters tab in chart review, and find the Anticoagulation Therapy Visit)    Therapeutic INR 2.6. Will continue maintenance dose and recheck in 1 week(s).      Kait Manriquez RN

## 2019-09-05 NOTE — TELEPHONE ENCOUNTER
Reason for Call:  Other     Detailed comments: INR 2.6  9/4 at 4:45 pm    Phone Number Patient can be reached at: Bailey [facility]  [phone]      Best Time: any    Can we leave a detailed message on this number? YES    Call taken on 9/5/2019 at 11:19 AM by Jadyn Rick

## 2019-09-05 NOTE — TELEPHONE ENCOUNTER
This writer attempted to contact Paresh RN on 09/05/19      Reason for call questions about INR and left message.      If patient calls back:   INR Registered Nurse called. Inform patient that someone from the INR group will contact them, document that pt called and route to [facility] [[id number]]      Kait Manriquez, RN

## 2019-09-06 ENCOUNTER — PATIENT OUTREACH (OUTPATIENT)
Dept: CARE COORDINATION | Facility: CLINIC | Age: 29
End: 2019-09-06

## 2019-09-06 NOTE — PROGRESS NOTES
Clinic Care Coordination Contact    Follow Up Progress Note      Assessment: Called patient, left message on voicemail and requested return call.     Per chart review and discussion with PCP patient had a low potassium at end of July and was to start taking potassium 40 meq and have repeat lab check.  Patient has not returned for lab check.     Home care continues to draw INR. On 9/4/19 it was 2.6.        Goals addressed this encounter:   Goals Addressed                 This Visit's Progress      Improve chronic symptoms (pt-stated)   30%     Goal Statement: I want the chest pains to go away    Measure of Success: When my chest no longer hurts    Supportive Steps to Achieve: Tylenol, ice or heat for comfort    Barriers: Multiple fractured ribs due to CPR    Strengths: Age    Date to Achieve By: November 2019    Patient expressed understanding of goal: Yes          Intervention/Education provided during outreach: Attempted contact     Outreach Frequency: monthly    Plan:   Patient will return call to clinic care coordinator. If no response;    Care Coordinator will follow up in 2 weeks    Monica Hernandez RN, [facility] RN Coordinator  [facility]   9/6/2019    1:54 PM  [phone]

## 2019-09-10 NOTE — TELEPHONE ENCOUNTER
Patient Name:  Hilaria Bonner  Date:  9/5/2019  Contact Type:  Telephone/ Bailey CAAL [facility] (HOME CARE) [phone] , called patient also, voicemail left for both people

## 2019-09-11 ENCOUNTER — TELEPHONE (OUTPATIENT)
Dept: FAMILY MEDICINE | Facility: CLINIC | Age: 29
End: 2019-09-11

## 2019-09-11 NOTE — TELEPHONE ENCOUNTER
.Reason for Call:  orders    Detailed comments: unable to do an INR today, Patient could not be reached, requesting orders for tomorrow Thurs 09-12 or 09-13;     Phone Number Patient can be reached at:  phone number:  [phone]    Best Time: anytime    Can we leave a detailed message on this number? YES    Call taken on 9/11/2019 at 3:16 PM by Argentina Maldonado

## 2019-09-12 ENCOUNTER — ANTICOAGULATION THERAPY VISIT (OUTPATIENT)
Dept: NURSING | Facility: CLINIC | Age: 29
End: 2019-09-12
Payer: COMMERCIAL

## 2019-09-12 ENCOUNTER — TELEPHONE (OUTPATIENT)
Dept: FAMILY MEDICINE | Facility: CLINIC | Age: 29
End: 2019-09-12

## 2019-09-12 DIAGNOSIS — I26.99 ACUTE MASSIVE PULMONARY EMBOLISM (H): ICD-10-CM

## 2019-09-12 DIAGNOSIS — I26.99 PULMONARY EMBOLISM WITH INFARCTION (H): ICD-10-CM

## 2019-09-12 LAB — INR PPP: 1 (ref 0.9–1.1)

## 2019-09-12 RX ORDER — WARFARIN SODIUM 4 MG/1
TABLET ORAL
Qty: 270 TABLET | Refills: 0 | Status: CANCELLED
Start: 2019-09-12

## 2019-09-12 NOTE — TELEPHONE ENCOUNTER
Provider to please address if patient needs lovenox. INR reading by home  Care was 1.0 today. Patient states she did not miss any medication. States she did have asparagus, carsisa greens and broccoli this week.    Diet stability has been discussed multiple times with this patient to no avail.     Kait Manriquez RN, [facility] Triage

## 2019-09-12 NOTE — PROGRESS NOTES
ANTICOAGULATION FOLLOW-UP CLINIC VISIT    Patient Name:  Hilaria Bonner  Date:  2019  Contact Type:  Telephone/ Joyce RN [facility]. [phone]    SUBJECTIVE:  Patient Findings     Positives:   Change in activity (a lot of walking), Change in diet/appetite (ate asperagus, carissa greens and broccoli this week. )        Clinical Outcomes     Negatives:   Major bleeding event, Thromboembolic event, Anticoagulation-related hospital admission, Anticoagulation-related ED visit, Anticoagulation-related fatality           OBJECTIVE    INR   Date Value Ref Range Status   2019 1.0 0.9 - 1.1 Final       ASSESSMENT / PLAN  INR assessment SUB    Recheck INR In: 4 DAYS    INR Location Homecare INR      Anticoagulation Summary  As of 2019    INR goal:   2.0-3.0   TTR:   43.9 % (3.2 mo)   INR used for dosin.0! (2019)   Warfarin maintenance plan:   12 mg (4 mg x 3) every day   Full warfarin instructions:   : 18 mg; : 16 mg; Otherwise 12 mg every day   Weekly warfarin total:   84 mg   Plan last modified:   Kait Manriquez RN (2019)   Next INR check:   2019   Target end date:            Anticoagulation Episode Summary     INR check location:   Anticoagulation Clinic    Preferred lab:       Send INR reminders to:   BELIA BRADEN    Comments:         Anticoagulation Care Providers     Provider Role Specialty Phone number    Crissy Madrigal PA-C Referring [facility] [phone]            See the Encounter Report to view Anticoagulation Flowsheet and Dosing Calendar (Go to Encounters tab in chart review, and find the Anticoagulation Therapy Visit)    Dosage adjustment made based on physician directed care plan. Huddle with Radha Ríos [facility] for dosing. Entered as Noted. Provider and H note that patient needs to see hematology ASAP. Note from [facility] reports:  [facility] for Bleeding and clotting next week (they sometimes work our patients in next day, or  within week).     Called patient and left high priority message for patient to call. Called home care and the RN took all dosing and lovenox information. Spoke to the patient and she has a few lovenox at home. She will call if she needs more. She notes she did not receive the full Rx of warfarin and will call pharmacy to get. Notes she seen hematology in May and she did not need to follow up with them.    Kait Manriquez, RN

## 2019-09-12 NOTE — TELEPHONE ENCOUNTER
This writer attempted to contact [facility] on 09/12/19      Reason for call dosing and left message.      If patient calls back:   INR Registered Nurse called. Inform patient that someone from the INR group will contact them, document that pt called and route to [facility] [[id number]]    Kait Manriquez, TEN

## 2019-09-12 NOTE — TELEPHONE ENCOUNTER
This writer attempted to contact home care nurse on 09/12/19      Reason for call results and left message.      If patient calls back:   Registered Nurse called. Follow Triage Call workflow        Sophia Zamora RN

## 2019-09-12 NOTE — TELEPHONE ENCOUNTER
Patient informed to see hematology and she states she did in May and does not need to go back.    Called patient and left high priority message for patient to call. Called home care and the RN took all dosing and lovenox information. Spoke to the patient and she has a few lovenox at home. She will call if she needs more. She notes she did not receive the full Rx of warfarin and will call pharmacy to get. Notes she seen hematology in May and she did not need to follow up with them.    Kait Manriquez RN, [facility] Triage

## 2019-09-12 NOTE — TELEPHONE ENCOUNTER
Reason for Call:  Other INR    Detailed comments: Pt returning phone call regarding INR and would like a call back.    Phone Number Patient can be reached at: Cell number on file:    Telephone Information:   Mobile [phone]       Best Time: anytime    Can we leave a detailed message on this number? YES    Call taken on 9/12/2019 at 1:08 PM by Reno Adrian

## 2019-09-16 ENCOUNTER — TELEPHONE (OUTPATIENT)
Dept: FAMILY MEDICINE | Facility: CLINIC | Age: 29
End: 2019-09-16

## 2019-09-16 ENCOUNTER — ANTICOAGULATION THERAPY VISIT (OUTPATIENT)
Dept: FAMILY MEDICINE | Facility: CLINIC | Age: 29
End: 2019-09-16
Payer: COMMERCIAL

## 2019-09-16 ENCOUNTER — MEDICAL CORRESPONDENCE (OUTPATIENT)
Dept: HEALTH INFORMATION MANAGEMENT | Facility: CLINIC | Age: 29
End: 2019-09-16

## 2019-09-16 LAB — INR PPP: 2.1 (ref 0.9–1.1)

## 2019-09-16 NOTE — PROGRESS NOTES
ANTICOAGULATION FOLLOW-UP CLINIC VISIT    Patient Name:  Hilaria Bonner  Date:  2019  Contact Type:  Telephone/ Joyce CAAL [phone]    SUBJECTIVE:  Patient Findings         Clinical Outcomes     Negatives:   Major bleeding event, Thromboembolic event, Anticoagulation-related hospital admission, Anticoagulation-related ED visit, Anticoagulation-related fatality           OBJECTIVE    INR   Date Value Ref Range Status   2019 2.1 (A) 0.9 - 1.1 Final       ASSESSMENT / PLAN  INR assessment THER    Recheck INR In: 2 DAYS    INR Location [facility]      Anticoagulation Summary  As of 2019    INR goal:   2.0-3.0   TTR:   42.5 % (3.3 mo)   INR used for dosin.1 (2019)   Warfarin maintenance plan:   12 mg (4 mg x 3) every day   Full warfarin instructions:   12 mg every day   Weekly warfarin total:   84 mg   Plan last modified:   Kait Manriquez RN (2019)   Next INR check:   2019   Target end date:            Anticoagulation Episode Summary     INR check location:   Anticoagulation Clinic    Preferred lab:       Send INR reminders to:   BELIA BRADEN    Comments:         Anticoagulation Care Providers     Provider Role Specialty Phone number    Crissy Madrigal PA-C Referring [facility] [phone]            See the Encounter Report to view Anticoagulation Flowsheet and Dosing Calendar (Go to Encounters tab in chart review, and find the Anticoagulation Therapy Visit)    Reviewed patient with [facility] and will continue with the 12 mg daily as she has been therapeutic on that dose. Will monitor close and recheck on Wednesday. Patient will continue on Lovenox till 2 in range since below 2.3 reading today.    Joyce CAAL [facility] [phone]      Kait Manriquez, RN

## 2019-09-16 NOTE — TELEPHONE ENCOUNTER
Reason for Call:  Other      Detailed comments: INR 2.1    Phone Number can be reached at: Joyce  [facility] [phone]    Best Time: any    Can we leave a detailed message on this number? YES    Call taken on 9/16/2019 at 9:52 AM by Jadyn Rick

## 2019-09-18 ENCOUNTER — TELEPHONE (OUTPATIENT)
Dept: FAMILY MEDICINE | Facility: CLINIC | Age: 29
End: 2019-09-18

## 2019-09-18 ENCOUNTER — TELEPHONE (OUTPATIENT)
Dept: FAMILY MEDICINE | Facility: CLINIC | Age: 29
End: 2019-09-18
Payer: COMMERCIAL

## 2019-09-18 LAB — INR PPP: 2.2

## 2019-09-18 NOTE — TELEPHONE ENCOUNTER
ANTICOAGULATION FOLLOW-UP CLINIC VISIT    Patient Name:  Hilaria Bonner  Date:  2019  Contact Type:  Telephone/ Svkzzqsa-[phone]    SUBJECTIVE:  Patient Findings         Clinical Outcomes     Negatives:   Major bleeding event, Thromboembolic event, Anticoagulation-related hospital admission, Anticoagulation-related ED visit, Anticoagulation-related fatality           OBJECTIVE    INR   Date Value Ref Range Status   2019 2.2  Final       ASSESSMENT / PLAN      Anticoagulation Summary  As of 2019    INR goal:   2.0-3.0   TTR:   43.7 % (3.4 mo)   INR used for dosin.2 (2019)   Warfarin maintenance plan:   12 mg (4 mg x 3) every day   Full warfarin instructions:   12 mg every day   Weekly warfarin total:   84 mg   No change documented:   Ayana Tay RN   Plan last modified:   Kait Manriquez RN (2019)   Next INR check:   2019   Target end date:            Anticoagulation Episode Summary     INR check location:   Anticoagulation Clinic    Preferred lab:       Send INR reminders to:   BELIA BRADEN    Comments:         Anticoagulation Care Providers     Provider Role Specialty Phone number    Crissy Madrigal PA-C Referring [facility] Practice [phone]            See the Encounter Report to view Anticoagulation Flowsheet and Dosing Calendar (Go to Encounters tab in chart review, and find the Anticoagulation Therapy Visit)    Dosage adjustment made based on physician directed care plan. Stopping Lovenox at this time since she is therapeutic twice in over 24 hours. Will continue 12 mg daily with recheck in 2 days to assure patient does not drop before the weekend.     Joyce called back later and said that patient is going out of town Thursday and Friday. Will recheck on 19 and will continue on 12 mg daily until recheck      Ayana Tay RN, BSN, PHN

## 2019-09-18 NOTE — TELEPHONE ENCOUNTER
Reason for Call:  INR    Who is calling?  Home [facility]: [facility]    Phone number:  [phone]      Name of caller: oJyce    INR Value:  2.2    Are there any other concerns:  Yes: need call back with orders    Can we leave a detailed message on this number? YES    Call taken on 9/18/2019 at 12:57 PM by Reno Adrian

## 2019-09-19 ENCOUNTER — PATIENT OUTREACH (OUTPATIENT)
Dept: CARE COORDINATION | Facility: CLINIC | Age: 29
End: 2019-09-19

## 2019-09-19 NOTE — PROGRESS NOTES
Clinic Care Coordination Contact  [facility]/Voicemail       Clinical Data: Care Coordinator Outreach     Left message on patient's voicemail with call back information and requested return call.    Patient had 2.2 INR on 9/18/19. Patient is currently back in range.   It appeared patient had not been taking coumadin as ordered as she was subtherapeutic. Lovenox was ordered. Home care continues to draw INR's and monitor medication adherence.     Plan: Care Coordinator will await return call from patient. Will continue to call and follow per request of PCP.     Outreach in one month or sooner as needed.     Monica Hernandez RN, [facility] RN Coordinator  [facility]   9/19/2019    10:30 AM  [phone]

## 2019-09-23 ENCOUNTER — ANTICOAGULATION THERAPY VISIT (OUTPATIENT)
Dept: NURSING | Facility: CLINIC | Age: 29
End: 2019-09-23
Payer: COMMERCIAL

## 2019-09-23 LAB — INR PPP: 5.1 (ref 0.9–1.1)

## 2019-09-23 NOTE — PROGRESS NOTES
ANTICOAGULATION FOLLOW-UP CLINIC VISIT    Patient Name:  Hilaria Bonner  Date:  2019  Contact Type:  Telephone/ [facility] and patient    SUBJECTIVE:  Patient Findings         Clinical Outcomes     Negatives:   Major bleeding event, Thromboembolic event, Anticoagulation-related hospital admission, Anticoagulation-related ED visit, Anticoagulation-related fatality           OBJECTIVE    INR   Date Value Ref Range Status   2019 5.1 (A) 0.9 - 1.1 Final       ASSESSMENT / PLAN  INR assessment SUPRA    Recheck INR In: 2 DAYS    INR Location [facility]      Anticoagulation Summary  As of 2019    INR goal:   2.0-3.0   TTR:   42.9 % (3.5 mo)   INR used for dosin.1! (2019)   Warfarin maintenance plan:   12 mg (4 mg x 3) every day   Full warfarin instructions:   : Hold; Otherwise 12 mg every day   Weekly warfarin total:   84 mg   Plan last modified:   Kait Manriquez RN (2019)   Next INR check:   2019   Target end date:            Anticoagulation Episode Summary     INR check location:   Anticoagulation Clinic    Preferred lab:       Send INR reminders to:   BELIA BRADEN    Comments:         Anticoagulation Care Providers     Provider Role Specialty Phone number    Crissy Madrigal PA-C Referring [facility] [phone]            See the Encounter Report to view Anticoagulation Flowsheet and Dosing Calendar (Go to Encounters tab in chart review, and find the Anticoagulation Therapy Visit)    Dosage adjustment made based on physician directed care plan. Decreased by about 20% with a recheck at PCP appointment on Wednesday.    Patient states negative for signs and symptoms of bleeding or blood clots, changes in medication, changes in diet, any signs of infection or antibiotic use, anything new OTC or herbal medications, any missed or extra doses of the warfarin.    Patient informed of the symptoms to be seen for either by INR nurse or ER.      Kait  ERIKA Manriquez RN

## 2019-09-24 ENCOUNTER — TELEPHONE (OUTPATIENT)
Dept: FAMILY MEDICINE | Facility: CLINIC | Age: 29
End: 2019-09-24

## 2019-09-24 NOTE — TELEPHONE ENCOUNTER
[facility] physican orders regarding INR placed on Dr. Davis's desk for completion.      Noemi BLUNT)(MANDY)

## 2019-09-25 ENCOUNTER — OFFICE VISIT (OUTPATIENT)
Dept: FAMILY MEDICINE | Facility: CLINIC | Age: 29
End: 2019-09-25
Payer: COMMERCIAL

## 2019-09-25 VITALS
TEMPERATURE: 98.2 F | RESPIRATION RATE: 20 BRPM | WEIGHT: 255 LBS | OXYGEN SATURATION: 100 % | DIASTOLIC BLOOD PRESSURE: 84 MMHG | HEIGHT: 66 IN | SYSTOLIC BLOOD PRESSURE: 124 MMHG | HEART RATE: 84 BPM | BODY MASS INDEX: 40.98 KG/M2

## 2019-09-25 DIAGNOSIS — I26.99 PULMONARY EMBOLISM WITH INFARCTION (H): ICD-10-CM

## 2019-09-25 DIAGNOSIS — Z23 NEED FOR PROPHYLACTIC VACCINATION AND INOCULATION AGAINST INFLUENZA: ICD-10-CM

## 2019-09-25 DIAGNOSIS — N17.9 ACUTE KIDNEY INJURY (H): ICD-10-CM

## 2019-09-25 DIAGNOSIS — Z30.42 ENCOUNTER FOR SURVEILLANCE OF INJECTABLE CONTRACEPTIVE: ICD-10-CM

## 2019-09-25 DIAGNOSIS — R20.0 NUMBNESS IN FEET: Primary | ICD-10-CM

## 2019-09-25 DIAGNOSIS — R51.9 ACUTE INTRACTABLE HEADACHE, UNSPECIFIED HEADACHE TYPE: ICD-10-CM

## 2019-09-25 LAB
HBA1C MFR BLD: 5.3 % (ref 0–5.6)
HCG UR QL: NEGATIVE
INR PPP: 3.3 (ref 0.86–1.14)

## 2019-09-25 PROCEDURE — 99214 OFFICE O/P EST MOD 30 MIN: CPT | Mod: 25 | Performed by: PHYSICIAN ASSISTANT

## 2019-09-25 PROCEDURE — 96372 THER/PROPH/DIAG INJ SC/IM: CPT | Mod: 59 | Performed by: PHYSICIAN ASSISTANT

## 2019-09-25 PROCEDURE — 81025 URINE PREGNANCY TEST: CPT | Performed by: PHYSICIAN ASSISTANT

## 2019-09-25 PROCEDURE — 90471 IMMUNIZATION ADMIN: CPT | Performed by: PHYSICIAN ASSISTANT

## 2019-09-25 PROCEDURE — 90686 IIV4 VACC NO PRSV 0.5 ML IM: CPT | Performed by: PHYSICIAN ASSISTANT

## 2019-09-25 PROCEDURE — 36415 COLL VENOUS BLD VENIPUNCTURE: CPT | Performed by: PHYSICIAN ASSISTANT

## 2019-09-25 PROCEDURE — 85610 PROTHROMBIN TIME: CPT | Performed by: PHYSICIAN ASSISTANT

## 2019-09-25 PROCEDURE — 80048 BASIC METABOLIC PNL TOTAL CA: CPT | Performed by: PHYSICIAN ASSISTANT

## 2019-09-25 PROCEDURE — 83036 HEMOGLOBIN GLYCOSYLATED A1C: CPT | Performed by: PHYSICIAN ASSISTANT

## 2019-09-25 RX ORDER — GABAPENTIN 300 MG/1
300 CAPSULE ORAL
Qty: 90 CAPSULE | Refills: 1 | Status: SHIPPED | OUTPATIENT
Start: 2019-09-25 | End: 2020-05-19

## 2019-09-25 RX ADMIN — MEDROXYPROGESTERONE ACETATE 150 MG: 150 INJECTION, SUSPENSION INTRAMUSCULAR at 19:03

## 2019-09-25 ASSESSMENT — PAIN SCALES - GENERAL: PAINLEVEL: NO PAIN (0)

## 2019-09-25 ASSESSMENT — MIFFLIN-ST. JEOR: SCORE: 1903.42

## 2019-09-25 NOTE — PROGRESS NOTES
Subjective     Hilaria Bonner is a 28 year old female who presents to clinic today for the following health issues:    HPI   Vaginal Symptoms  Onset: couple weeks    Description:  Vaginal Discharge: none   Itching (Pruritis): YES  Burning sensation:  no   Odor: YES    Accompanying Signs & Symptoms:  Pain with Urination: Urgency  Abdominal Pain: no   Fever: no     History:   Sexually active: YES  New Partner: no   Possibility of Pregnancy:  Missed DEPO    Precipitating factors:   Recent Antibiotic Use: no     Alleviating factors:  none    Therapies Tried and outcome: none    Will come in later this week to discuss vaginal concerns since 3 year old with her today   Not consistent with diet and reports was advised best greens are green beans and peas   INR is off al lot .  Reports not eating  heaping portion of vegetables - 5 bites at most.   Denies any alcohol use   Feeling well.  No longer living with mom  Not back to work yet.  Wants to go back to work - barely having any chest pains.   Numbness in legs- wears compression stockings.   Feel like sometimes burning sensaiton at bottom of feet   hasnt' seen physical therapy in awhile   Is sexually active- overdue for depo   Not sure if external itching related to using nare - itching is of labia  Patient well known to me - massive PE causing cardiac arrest- hematology recommended repeating antithrombin  and antiphospholipid antibody testing in 3 months and no need to follow up with them if labs normal           Patient Active Problem List   Diagnosis     Morbid obesity (H)     Vitamin D deficiency     Elevated parathyroid hormone     Encounter for smoking cessation counseling     Menorrhagia with irregular cycle     Morbid (severe) obesity due to excess calories (H)     Pulmonary embolism with infarction (H)     Past Surgical History:   Procedure Laterality Date     GYN SURGERY      2 C-sections     KNEE SURGERY  most recently - [id number]    3 surgeries in [place]      LAPAROSCOPIC GASTRIC SLEEVE N/A 3/26/2019    Procedure: Laparoscopic Sleeve Gastrectomy;  Surgeon: Luan Lopez MD;  Location: [facility] OR     ORTHOPEDIC SURGERY      2 knee meniscus surgery       Social History     Tobacco Use     Smoking status: Former Smoker     Years: 1.00     Start date: 2016     Last attempt to quit: 2018     Years since quittin.7     Smokeless tobacco: Never Used   Substance Use Topics     Alcohol use: Not Currently     Alcohol/week: 0.0 standard drinks     Comment: occasional     Family History   Problem Relation Age of Onset     Diabetes Father      Hypertension Father      Breast Cancer Sister 26        surgery only     Cancer Sister      Coronary Artery Disease Other         paternal aunt     Thyroid Disease Other         neice     Coronary Artery Disease Other      Cerebrovascular Disease Other      Breast Cancer Sister      Thyroid Disease Other      Cerebrovascular Disease No family hx of          Current Outpatient Medications   Medication Sig Dispense Refill     B Complex Vitamins (VITAMIN-B COMPLEX) TABS Take 1 tablet by mouth       Calcium Citrate-Vitamin D 500-500 MG-UNIT CHEW Take 1 chew tab by mouth 2 times daily 180 tablet 3     CHANTIX STARTING MONTH AMADO 0.5 MG X 11 & 1 MG X 42 tablet        childrens multivitamin w/iron (FLINTSTONES COMPLETE) 60 MG chewable tablet Take 1 chew tab by mouth daily       Cyanocobalamin (B-12) 500 MCG SUBL Place 1 tablet under the tongue daily 90 tablet 3     cyanocobalamin (VITAMIN B-12) 1000 MCG tablet Take 1,000 mcg by mouth       enoxaparin (LOVENOX) 120 MG/0.8ML syringe Inject 0.8 mLs (120 mg) Subcutaneous every 12 hours 12 Syringe 1     fluticasone (FLONASE) 50 MCG/ACT nasal spray 2 sprays       folic acid (FOLVITE) 1 MG tablet        gabapentin (NEURONTIN) 300 MG capsule Take 1 capsule (300 mg) by mouth nightly as needed 90 capsule 1     magic mouthwash (FIRST-MOUTHWASH BLM) compounding kit Take 30 mLs by mouth every 6  hours as needed for mouth sores 900 mL 2     Multiple Vitamins-Minerals (MULTIVITAMIN ADULTS PO) Take 1 tablet by mouth daily       omeprazole (PRILOSEC) 20 MG DR capsule Take 1 capsule (20 mg) by mouth 2 times daily 180 capsule 1     ondansetron (ZOFRAN-ODT) 4 MG ODT tab Take 1 tablet (4 mg) by mouth every 6 hours as needed for nausea or vomiting 12 tablet 1     polyethylene glycol (MIRALAX/GLYCOLAX) powder Take 17 g (1 capful) by mouth daily 850 g 3     topiramate (TOPAMAX) 25 MG tablet Take 3 tablets (75 mg) by mouth daily 90 tablet 3     ursodiol (ACTIGALL) 300 MG capsule Take 1 capsule (300 mg) by mouth 2 times daily 60 capsule 4     varenicline (CHANTIX AMADO) 0.5 MG X 11 & 1 MG X 42 tablet Take 0.5 mg tab daily for 3 days, THEN 0.5 mg tab twice daily for 4 days, THEN 1 mg twice daily. 53 tablet 0     vitamin  s/Minerals TABS Take 1 tablet by mouth       vitamin B complex with vitamin C (VITAMIN  B COMPLEX) tablet Take 1 tablet by mouth daily 90 tablet 1     vitamin C (ASCORBIC ACID) 1000 MG TABS Take 1,000 mg by mouth       vitamin D3 (CHOLECALCIFEROL) 2000 units tablet Take 1 tablet by mouth daily 90 tablet 1     warfarin (COUMADIN) 4 MG tablet Take 3 tablets by mouth daily or as directed by [facility] nurse. 270 tablet 0     ferrous sulfate (FEROSUL) 325 (65 Fe) MG tablet Take 325 mg by mouth       BP Readings from Last 3 Encounters:   09/27/19 127/85   09/25/19 124/84   07/31/19 128/84    Wt Readings from Last 3 Encounters:   09/27/19 114.3 kg (252 lb)   09/25/19 115.7 kg (255 lb)   07/31/19 113.9 kg (251 lb)                      Reviewed and updated as needed this visit by Provider  Tobacco  Allergies  Meds  Problems  Med Hx  Surg Hx  Fam Hx  Soc Hx          Review of Systems   ROS COMP: Constitutional, HEENT, cardiovascular, pulmonary, gi and gu systems are negative, except as otherwise noted.      Objective    /84 (BP Location: Right arm, Patient Position: Chair, Cuff Size: Adult Large)   Pulse 84   " Temp 98.2  F (36.8  C) (Oral)   Resp 20   Ht 1.676 m (5' 6\")   Wt 115.7 kg (255 lb)   LMP  (Exact Date)   SpO2 100%   Breastfeeding? No   BMI 41.16 kg/m    Body mass index is 41.16 kg/m .  Physical Exam   GENERAL: alert, no distress and obese  NECK: no adenopathy, no asymmetry, masses, or scars and thyroid normal to palpation  RESP: lungs clear to auscultation - no rales, rhonchi or wheezes  CV: regular rate and rhythm, normal S1 S2, no S3 or S4, no murmur, click or rub, no peripheral edema and peripheral pulses strong  ABDOMEN: soft, nontender, no hepatosplenomegaly, no masses and bowel sounds normal  MS: no gross musculoskeletal defects noted, no edema  PSYCH: mentation appears normal, affect normal/bright, judgement and insight intact and appearance well groomed    Diagnostic Test Results:  Results for orders placed or performed in visit on 09/25/19   INR   Result Value Ref Range    INR 3.30 (H) 0.86 - 1.14   Hemoglobin A1c   Result Value Ref Range    Hemoglobin A1C 5.3 0 - 5.6 %   Basic metabolic panel   Result Value Ref Range    Sodium 143 133 - 144 mmol/L    Potassium 3.5 3.4 - 5.3 mmol/L    Chloride 108 94 - 109 mmol/L    Carbon Dioxide 26 20 - 32 mmol/L    Anion Gap 9 3 - 14 mmol/L    Glucose 81 70 - 99 mg/dL    Urea Nitrogen 9 7 - 30 mg/dL    Creatinine 0.70 0.52 - 1.04 mg/dL    GFR Estimate >90 >60 mL/min/[1.73_m2]    GFR Estimate If Black >90 >60 mL/min/[1.73_m2]    Calcium 8.6 8.5 - 10.1 mg/dL   HCG Qual, Urine ([id number])   Result Value Ref Range    HCG Qual Urine Negative NEG^Negative           Assessment & Plan     1. Acute intractable headache, unspecified headache type  Gabapentin helps sleep and headache   - gabapentin (NEURONTIN) 300 MG capsule; Take 1 capsule (300 mg) by mouth nightly as needed  Dispense: 90 capsule; Refill: 1    2. Numbness in feet  Normal A1C no evidence of diabetes - continue further workup if persists   - Hemoglobin A1c    3. Pulmonary embolism with infarction (H)  INR " "has been up to 5 - now improved with holding dose - tried lyncing RN to manage coumadin but out of office- will take usual dose tonight and follow up with INR nurse in Am   - INR    4. Acute kidney injury (H)  Normal renal function   - Basic metabolic panel    5. Encounter for surveillance of injectable contraceptive  Negative pregnancy test- given depo today  - HCG Qual, Urine ([id number])    6. Need for prophylactic vaccination and inoculation against influenza    - INFLUENZA VACCINE IM > 6 MONTHS VALENT IIV4 [01849]  - Vaccine Administration, Initial [74228]     BMI:   Estimated body mass index is 41.16 kg/m  as calculated from the following:    Height as of this encounter: 1.676 m (5' 6\").    Weight as of this encounter: 115.7 kg (255 lb).   Weight management plan: Discussed healthy diet and exercise guidelines        Patient Instructions   Call us tomorrow if you do not here from INR nurse by noon.  Return urgently if any change in symptoms.    I will notify you of  Lab results via [employer]   Return urgently if any change in symptoms.   Work on keeping diet consistent        Return in about 4 weeks (around 10/23/2019), or if symptoms worsen or fail to improve.    Crissy Madrigal PA-C  [facility]      "

## 2019-09-25 NOTE — LETTER
September 25, 2019      Hilaria Bonner  [address]  [address]  [place] [address]        To Whom It May Concern,       Please allow Hilaria Bonner to return to work with no restrictions as of Monday September 30.  She was seen in clinic today September 25       Sincerely,        Crissy Madrigal PA-C

## 2019-09-26 ENCOUNTER — ANTICOAGULATION THERAPY VISIT (OUTPATIENT)
Dept: NURSING | Facility: CLINIC | Age: 29
End: 2019-09-26
Payer: COMMERCIAL

## 2019-09-26 ENCOUNTER — MEDICAL CORRESPONDENCE (OUTPATIENT)
Dept: HEALTH INFORMATION MANAGEMENT | Facility: CLINIC | Age: 29
End: 2019-09-26

## 2019-09-26 LAB
ANION GAP SERPL CALCULATED.3IONS-SCNC: 9 MMOL/L (ref 3–14)
BUN SERPL-MCNC: 9 MG/DL (ref 7–30)
CALCIUM SERPL-MCNC: 8.6 MG/DL (ref 8.5–10.1)
CHLORIDE SERPL-SCNC: 108 MMOL/L (ref 94–109)
CO2 SERPL-SCNC: 26 MMOL/L (ref 20–32)
CREAT SERPL-MCNC: 0.7 MG/DL (ref 0.52–1.04)
GFR SERPL CREATININE-BSD FRML MDRD: >90 ML/MIN/{1.73_M2}
GLUCOSE SERPL-MCNC: 81 MG/DL (ref 70–99)
POTASSIUM SERPL-SCNC: 3.5 MMOL/L (ref 3.4–5.3)
SODIUM SERPL-SCNC: 143 MMOL/L (ref 133–144)

## 2019-09-26 NOTE — NURSING NOTE
BP: 124/84    LAST PAP/EXAM:   Lab Results   Component Value Date    PAP NIL 12/06/2016     URINE HCG:negative    The following medication was given:     MEDICATION: Depo Provera 150mg  ROUTE: IM  SITE: Deltoid - Right  : Soha  LOT #: [id number]  EXP:07/2020  NDC#34890-719-04  NEXT INJECTION DUE: 12/11/19 - 12/25/19

## 2019-09-26 NOTE — PATIENT INSTRUCTIONS
Call us tomorrow if you do not here from INR nurse by noon.  Return urgently if any change in symptoms.    I will notify you of  Lab results via [employer]   Return urgently if any change in symptoms.   Work on keeping diet consistent

## 2019-09-26 NOTE — RESULT ENCOUNTER NOTE
She was in late today for INR- please call with recommendations- I believe she was going to take 12 mg tonight 9/25/19 and further recommendations tomorrow

## 2019-09-26 NOTE — RESULT ENCOUNTER NOTE
Aleksey Borges  Your electrolytes, blood sugar and kidney function were normal. Continue medications as you have been taking.   You do not have diabetes  As we reviewed your INR was 3.3.   Please call or MyChart my office with any questions or concerns.    Crissy Madrigal, PAC

## 2019-09-26 NOTE — PROGRESS NOTES
ANTICOAGULATION FOLLOW-UP CLINIC VISIT    Patient Name:  Hilaria Bonner  Date:  9/26/2019  Contact Type:  Telephone    SUBJECTIVE:  Patient Findings     Positives:   Missed doses (held once this week), Change in diet/appetite (not consistent)        Clinical Outcomes     Negatives:   Major bleeding event, Thromboembolic event, Anticoagulation-related hospital admission, Anticoagulation-related ED visit, Anticoagulation-related fatality           OBJECTIVE    INR   Date Value Ref Range Status   09/25/2019 3.30 (H) 0.86 - 1.14 Final       ASSESSMENT / PLAN  INR assessment SUPRA    Recheck INR In: 5 DAYS    INR Location Clinic      Anticoagulation Summary  As of 9/26/2019    INR goal:   2.0-3.0   TTR:   41.8 % (3.6 mo)   INR used for dosing:   3.30! (9/25/2019)   Warfarin maintenance plan:   12 mg (4 mg x 3) every day   Full warfarin instructions:   9/26: 8 mg; 9/28: 8 mg; 9/30: 8 mg; Otherwise 12 mg every day   Weekly warfarin total:   84 mg   Plan last modified:   Kait Manriquez RN (7/8/2019)   Next INR check:   10/1/2019   Target end date:            Anticoagulation Episode Summary     INR check location:   Anticoagulation Clinic    Preferred lab:       Send INR reminders to:   BELIA BRADEN    Comments:         Anticoagulation Care Providers     Provider Role Specialty Phone number    Crissy Madrigal PA-C Referring [facility] Practice [phone]            See the Encounter Report to view Anticoagulation Flowsheet and Dosing Calendar (Go to Encounters tab in chart review, and find the Anticoagulation Therapy Visit)    Dosage adjustment made based on physician directed care plan. Held at the same dosing for past 7 days as the patient has been stable on a high dose recently. Patient will try and be consistent with her green intake.    Patient states negative for signs and symptoms of bleeding or blood clots, changes in medication, changes in diet, any signs of infection or antibiotic use, anything  new OTC or herbal medications, any missed or extra doses of the warfarin.    Patient informed of the symptoms to be seen for either by INR nurse or ER.      Kait Manriquez RN

## 2019-09-27 ENCOUNTER — OFFICE VISIT (OUTPATIENT)
Dept: FAMILY MEDICINE | Facility: CLINIC | Age: 29
End: 2019-09-27
Payer: COMMERCIAL

## 2019-09-27 VITALS
OXYGEN SATURATION: 98 % | BODY MASS INDEX: 43.02 KG/M2 | RESPIRATION RATE: 16 BRPM | DIASTOLIC BLOOD PRESSURE: 85 MMHG | HEART RATE: 100 BPM | TEMPERATURE: 98.5 F | WEIGHT: 252 LBS | HEIGHT: 64 IN | SYSTOLIC BLOOD PRESSURE: 127 MMHG

## 2019-09-27 DIAGNOSIS — I26.99 PULMONARY EMBOLISM WITH INFARCTION (H): ICD-10-CM

## 2019-09-27 DIAGNOSIS — N89.8 VAGINAL ODOR: ICD-10-CM

## 2019-09-27 DIAGNOSIS — K64.4 EXTERNAL HEMORRHOIDS: ICD-10-CM

## 2019-09-27 DIAGNOSIS — Z12.4 SCREENING FOR CERVICAL CANCER: ICD-10-CM

## 2019-09-27 DIAGNOSIS — R39.15 URINARY URGENCY: ICD-10-CM

## 2019-09-27 DIAGNOSIS — D64.9 ANEMIA, UNSPECIFIED TYPE: ICD-10-CM

## 2019-09-27 DIAGNOSIS — B37.31 YEAST VAGINITIS: Primary | ICD-10-CM

## 2019-09-27 LAB
ALBUMIN UR-MCNC: NEGATIVE MG/DL
APPEARANCE UR: CLEAR
AT III ACT/NOR PPP CHRO: 130 % (ref 85–135)
BACTERIA #/AREA URNS HPF: ABNORMAL /HPF
BASOPHILS # BLD AUTO: 0 10E9/L (ref 0–0.2)
BASOPHILS NFR BLD AUTO: 0.7 %
BILIRUB UR QL STRIP: NEGATIVE
COLOR UR AUTO: YELLOW
DIFFERENTIAL METHOD BLD: ABNORMAL
EOSINOPHIL # BLD AUTO: 0 10E9/L (ref 0–0.7)
EOSINOPHIL NFR BLD AUTO: 0.5 %
ERYTHROCYTE [DISTWIDTH] IN BLOOD BY AUTOMATED COUNT: 21.8 % (ref 10–15)
FERRITIN SERPL-MCNC: 171 NG/ML (ref 12–150)
GLUCOSE UR STRIP-MCNC: NEGATIVE MG/DL
HCT VFR BLD AUTO: 39.8 % (ref 35–47)
HGB BLD-MCNC: 13.2 G/DL (ref 11.7–15.7)
HGB UR QL STRIP: ABNORMAL
IRON SATN MFR SERPL: 42 % (ref 15–46)
IRON SERPL-MCNC: 114 UG/DL (ref 35–180)
KETONES UR STRIP-MCNC: NEGATIVE MG/DL
LEUKOCYTE ESTERASE UR QL STRIP: NEGATIVE
LYMPHOCYTES # BLD AUTO: 2.3 10E9/L (ref 0.8–5.3)
LYMPHOCYTES NFR BLD AUTO: 39.5 %
MCH RBC QN AUTO: 33.5 PG (ref 26.5–33)
MCHC RBC AUTO-ENTMCNC: 33.2 G/DL (ref 31.5–36.5)
MCV RBC AUTO: 101 FL (ref 78–100)
MONOCYTES # BLD AUTO: 0.5 10E9/L (ref 0–1.3)
MONOCYTES NFR BLD AUTO: 8.2 %
MUCOUS THREADS #/AREA URNS LPF: PRESENT /LPF
NEUTROPHILS # BLD AUTO: 2.9 10E9/L (ref 1.6–8.3)
NEUTROPHILS NFR BLD AUTO: 51.1 %
NITRATE UR QL: NEGATIVE
NON-SQ EPI CELLS #/AREA URNS LPF: ABNORMAL /LPF
PH UR STRIP: 7.5 PH (ref 5–7)
PLATELET # BLD AUTO: 407 10E9/L (ref 150–450)
RBC # BLD AUTO: 3.94 10E12/L (ref 3.8–5.2)
RBC #/AREA URNS AUTO: ABNORMAL /HPF
SOURCE: ABNORMAL
SP GR UR STRIP: 1.01 (ref 1–1.03)
SPECIMEN SOURCE: ABNORMAL
TIBC SERPL-MCNC: 274 UG/DL (ref 240–430)
UROBILINOGEN UR STRIP-ACNC: 1 EU/DL (ref 0.2–1)
WBC # BLD AUTO: 5.7 10E9/L (ref 4–11)
WBC #/AREA URNS AUTO: ABNORMAL /HPF
WET PREP SPEC: ABNORMAL

## 2019-09-27 PROCEDURE — 87210 SMEAR WET MOUNT SALINE/INK: CPT | Performed by: PHYSICIAN ASSISTANT

## 2019-09-27 PROCEDURE — G0145 SCR C/V CYTO,THINLAYER,RESCR: HCPCS | Performed by: PHYSICIAN ASSISTANT

## 2019-09-27 PROCEDURE — 36415 COLL VENOUS BLD VENIPUNCTURE: CPT | Performed by: PHYSICIAN ASSISTANT

## 2019-09-27 PROCEDURE — 99213 OFFICE O/P EST LOW 20 MIN: CPT | Performed by: PHYSICIAN ASSISTANT

## 2019-09-27 PROCEDURE — 85732 THROMBOPLASTIN TIME PARTIAL: CPT | Performed by: PHYSICIAN ASSISTANT

## 2019-09-27 PROCEDURE — 82728 ASSAY OF FERRITIN: CPT | Performed by: PHYSICIAN ASSISTANT

## 2019-09-27 PROCEDURE — 83550 IRON BINDING TEST: CPT | Performed by: PHYSICIAN ASSISTANT

## 2019-09-27 PROCEDURE — 83540 ASSAY OF IRON: CPT | Performed by: PHYSICIAN ASSISTANT

## 2019-09-27 PROCEDURE — 85597 PHOSPHOLIPID PLTLT NEUTRALIZ: CPT | Performed by: PHYSICIAN ASSISTANT

## 2019-09-27 PROCEDURE — 85730 THROMBOPLASTIN TIME PARTIAL: CPT | Performed by: PHYSICIAN ASSISTANT

## 2019-09-27 PROCEDURE — 85613 RUSSELL VIPER VENOM DILUTED: CPT | Performed by: PHYSICIAN ASSISTANT

## 2019-09-27 PROCEDURE — 85025 COMPLETE CBC W/AUTO DIFF WBC: CPT | Performed by: PHYSICIAN ASSISTANT

## 2019-09-27 PROCEDURE — 81001 URINALYSIS AUTO W/SCOPE: CPT | Performed by: PHYSICIAN ASSISTANT

## 2019-09-27 PROCEDURE — 85300 ANTITHROMBIN III ACTIVITY: CPT | Performed by: PHYSICIAN ASSISTANT

## 2019-09-27 RX ORDER — FLUCONAZOLE 150 MG/1
150 TABLET ORAL ONCE
Qty: 1 TABLET | Refills: 0 | Status: SHIPPED | OUTPATIENT
Start: 2019-09-27 | End: 2019-10-10

## 2019-09-27 ASSESSMENT — MIFFLIN-ST. JEOR: SCORE: 1858.06

## 2019-09-27 ASSESSMENT — PAIN SCALES - GENERAL: PAINLEVEL: NO PAIN (0)

## 2019-09-27 NOTE — PATIENT INSTRUCTIONS
Take diflucan for one dose  Follow up with INR clinic as they recommend  Return urgently if any change in symptoms like increasing pain, fever, vomiting or other change in symptoms.    Soak in tub for 15-20 minutes at a time several times a day   Follow up with colorectal surgeon for hemorrhoids

## 2019-09-27 NOTE — PROGRESS NOTES
Subjective     Hilaria Bonner is a 28 year old female who presents to clinic today for the following health issues:    HPI   Vaginal Symptoms  Onset: couple weeks    Description:  Vaginal Discharge: none   Itching (Pruritis): YES  Burning sensation:  no   Odor: YES    Accompanying Signs & Symptoms:  Pain with Urination: Urgency  Abdominal Pain: no   Fever: no     History:   Sexually active: YES  New Partner: no   Possibility of Pregnancy: No    Precipitating factors:   Recent Antibiotic Use: no     Alleviating factors:  none     Therapies Tried and outcome: none      Urinary urgency without dysuria   No vaginal discharge but has noted odor and itching.  Is sexually active with one partner- using depo for contraception  Mentions painful hemorrhoids intermittently especially if has to strain to have bowel movement - tried over the counter treatments without much improvement         Patient Active Problem List   Diagnosis     Morbid obesity (H)     Vitamin D deficiency     Elevated parathyroid hormone     Encounter for smoking cessation counseling     Menorrhagia with irregular cycle     Morbid (severe) obesity due to excess calories (H)     Pulmonary embolism with infarction (H)     Past Surgical History:   Procedure Laterality Date     GYN SURGERY      2 C-sections     KNEE SURGERY  most recently - [id number]    3 surgeries in [place]     LAPAROSCOPIC GASTRIC SLEEVE N/A 3/26/2019    Procedure: Laparoscopic Sleeve Gastrectomy;  Surgeon: Luan Lopez MD;  Location: [facility] OR     ORTHOPEDIC SURGERY      2 knee meniscus surgery       Social History     Tobacco Use     Smoking status: Former Smoker     Years: 1.00     Start date: 2016     Last attempt to quit: 2018     Years since quittin.7     Smokeless tobacco: Never Used   Substance Use Topics     Alcohol use: Not Currently     Alcohol/week: 0.0 standard drinks     Comment: occasional     Family History   Problem Relation Age of Onset     Diabetes  Father      Hypertension Father      Breast Cancer Sister 26        surgery only     Cancer Sister      Coronary Artery Disease Other         paternal aunt     Thyroid Disease Other         neice     Coronary Artery Disease Other      Cerebrovascular Disease Other      Breast Cancer Sister      Thyroid Disease Other      Cerebrovascular Disease No family hx of          Current Outpatient Medications   Medication Sig Dispense Refill     B Complex Vitamins (VITAMIN-B COMPLEX) TABS Take 1 tablet by mouth       Calcium Citrate-Vitamin D 500-500 MG-UNIT CHEW Take 1 chew tab by mouth 2 times daily 180 tablet 3     CHANTIX STARTING MONTH AMADO 0.5 MG X 11 & 1 MG X 42 tablet        childrens multivitamin w/iron (FLINTSTONES COMPLETE) 60 MG chewable tablet Take 1 chew tab by mouth daily       Cyanocobalamin (B-12) 500 MCG SUBL Place 1 tablet under the tongue daily 90 tablet 3     cyanocobalamin (VITAMIN B-12) 1000 MCG tablet Take 1,000 mcg by mouth       enoxaparin (LOVENOX) 120 MG/0.8ML syringe Inject 0.8 mLs (120 mg) Subcutaneous every 12 hours 12 Syringe 1     ferrous sulfate (FEROSUL) 325 (65 Fe) MG tablet Take 325 mg by mouth       fluticasone (FLONASE) 50 MCG/ACT nasal spray 2 sprays       folic acid (FOLVITE) 1 MG tablet        gabapentin (NEURONTIN) 300 MG capsule Take 1 capsule (300 mg) by mouth nightly as needed 90 capsule 1     magic mouthwash (FIRST-MOUTHWASH BLM) compounding kit Take 30 mLs by mouth every 6 hours as needed for mouth sores 900 mL 2     Multiple Vitamins-Minerals (MULTIVITAMIN ADULTS PO) Take 1 tablet by mouth daily       omeprazole (PRILOSEC) 20 MG DR capsule Take 1 capsule (20 mg) by mouth 2 times daily 180 capsule 1     ondansetron (ZOFRAN-ODT) 4 MG ODT tab Take 1 tablet (4 mg) by mouth every 6 hours as needed for nausea or vomiting 12 tablet 1     polyethylene glycol (MIRALAX/GLYCOLAX) powder Take 17 g (1 capful) by mouth daily 850 g 3     topiramate (TOPAMAX) 25 MG tablet Take 3 tablets (75 mg)  "by mouth daily 90 tablet 3     ursodiol (ACTIGALL) 300 MG capsule Take 1 capsule (300 mg) by mouth 2 times daily 60 capsule 4     varenicline (CHANTIX AMADO) 0.5 MG X 11 & 1 MG X 42 tablet Take 0.5 mg tab daily for 3 days, THEN 0.5 mg tab twice daily for 4 days, THEN 1 mg twice daily. 53 tablet 0     vitamin  s/Minerals TABS Take 1 tablet by mouth       vitamin B complex with vitamin C (VITAMIN  B COMPLEX) tablet Take 1 tablet by mouth daily 90 tablet 1     vitamin C (ASCORBIC ACID) 1000 MG TABS Take 1,000 mg by mouth       vitamin D3 (CHOLECALCIFEROL) 2000 units tablet Take 1 tablet by mouth daily 90 tablet 1     warfarin (COUMADIN) 4 MG tablet Take 3 tablets by mouth daily or as directed by [facility] nurse. 270 tablet 0     BP Readings from Last 3 Encounters:   09/27/19 127/85   09/25/19 124/84   07/31/19 128/84    Wt Readings from Last 3 Encounters:   09/27/19 114.3 kg (252 lb)   09/25/19 115.7 kg (255 lb)   07/31/19 113.9 kg (251 lb)                      Reviewed and updated as needed this visit by Provider  Tobacco  Allergies  Meds  Problems  Med Hx  Surg Hx  Fam Hx  Soc Hx          Review of Systems   ROS COMP: Constitutional, HEENT, cardiovascular, pulmonary, gi and gu systems are negative, except as otherwise noted.      Objective    /85 (BP Location: Right arm, Patient Position: Chair, Cuff Size: Adult Large)   Pulse 100   Temp 98.5  F (36.9  C) (Oral)   Resp 16   Ht 1.626 m (5' 4\")   Wt 114.3 kg (252 lb)   LMP  (Exact Date)   SpO2 98%   Breastfeeding? No   BMI 43.26 kg/m    Body mass index is 43.26 kg/m .  Physical Exam   GENERAL: alert, no distress and obese   (female): normal female external genitalia, normal urethral meatus , vaginal mucosa pink, moist, well rugated and normal cervix, adnexae, and uterus without masses.  Rectum with large nonthrombosed tender hemorrhoids   Diagnostic Test Results:  Results for orders placed or performed in visit on 09/27/19   CBC with platelets " differential   Result Value Ref Range    WBC 5.7 4.0 - 11.0 10e9/L    RBC Count 3.94 3.8 - 5.2 10e12/L    Hemoglobin 13.2 11.7 - 15.7 g/dL    Hematocrit 39.8 35.0 - 47.0 %     (H) 78 - 100 fl    MCH 33.5 (H) 26.5 - 33.0 pg    MCHC 33.2 31.5 - 36.5 g/dL    RDW 21.8 (H) 10.0 - 15.0 %    Platelet Count 407 150 - 450 10e9/L    % Neutrophils 51.1 %    % Lymphocytes 39.5 %    % Monocytes 8.2 %    % Eosinophils 0.5 %    % Basophils 0.7 %    Absolute Neutrophil 2.9 1.6 - 8.3 10e9/L    Absolute Lymphocytes 2.3 0.8 - 5.3 10e9/L    Absolute Monocytes 0.5 0.0 - 1.3 10e9/L    Absolute Eosinophils 0.0 0.0 - 0.7 10e9/L    Absolute Basophils 0.0 0.0 - 0.2 10e9/L    Diff Method Automated Method    Iron and iron binding capacity   Result Value Ref Range    Iron 114 35 - 180 ug/dL    Iron Binding Cap 274 240 - 430 ug/dL    Iron Saturation Index 42 15 - 46 %   Ferritin   Result Value Ref Range    Ferritin 171 (H) 12 - 150 ng/mL   Antithrombin III   Result Value Ref Range    Antithrombin III Chromogenic 130 85 - 135 %   *UA reflex to Microscopic and Culture ([place] [facility] (except [place] and [place])   Result Value Ref Range    Color Urine Yellow     Appearance Urine Clear     Glucose Urine Negative NEG^Negative mg/dL    Bilirubin Urine Negative NEG^Negative    Ketones Urine Negative NEG^Negative mg/dL    Specific Gravity Urine 1.015 1.003 - 1.035    Blood Urine Trace (A) NEG^Negative    pH Urine 7.5 (H) 5.0 - 7.0 pH    Protein Albumin Urine Negative NEG^Negative mg/dL    Urobilinogen Urine 1.0 0.2 - 1.0 EU/dL    Nitrite Urine Negative NEG^Negative    Leukocyte Esterase Urine Negative NEG^Negative    Source Midstream Urine    Urine Microscopic   Result Value Ref Range    WBC Urine 0 - 5 OTO5^0 - 5 /HPF    RBC Urine O - 2 OTO2^O - 2 /HPF    Squamous Epithelial /LPF Urine Few FEW^Few /LPF    Bacteria Urine Few (A) NEG^Negative /HPF    Mucous Urine Present (A) NEG^Negative /LPF   Wet prep   Result Value Ref Range     "Specimen Description Vagina     Wet Prep No clue cells seen     Wet Prep No Trichomonas seen     Wet Prep Moderate  Yeast seen   (A)     Wet Prep Few  WBC'S seen              Assessment & Plan     1. Yeast vaginitis  Will treat with diflucan for one dose   - fluconazole (DIFLUCAN) 150 MG tablet; Take 1 tablet (150 mg) by mouth once for 1 dose  Dispense: 1 tablet; Refill: 0    2. Pulmonary embolism with infarction (H)  Most recent appointment with hematology recommended rechecking labs- continue with coumadin indefinitely and refer back to hematology if abnormal labs   - Lupus Anticoagulant Panel  - Antithrombin III  - Urine Microscopic    3. Anemia, unspecified type  hgb now in normal range - normal iron levels   - CBC with platelets differential  - Iron and iron binding capacity  - Ferritin  - JUST IN CASE    4. Urinary urgency  Negative urinalysis   - *UA reflex to Microscopic and Culture ([facility] (except [place] and [place])    5. Vaginal odor  No clue cells   - Wet prep    6. Screening for cervical cancer  Pap pending   - Pap imaged thin layer screen reflex to HPV if ASCUS - recommend age 25 - 29    7. External hemorrhoids  Referred to colorectal surgery   - COLORECTAL SURGERY REFERRAL     BMI:   Estimated body mass index is 43.26 kg/m  as calculated from the following:    Height as of this encounter: 1.626 m (5' 4\").    Weight as of this encounter: 114.3 kg (252 lb).   Weight management plan: Discussed healthy diet and exercise guidelines        Patient Instructions   Take diflucan for one dose  Follow up with INR clinic as they recommend  Return urgently if any change in symptoms like increasing pain, fever, vomiting or other change in symptoms.    Soak in tub for 15-20 minutes at a time several times a day   Follow up with colorectal surgeon for hemorrhoids        Return in about 5 days (around 10/2/2019), or if symptoms worsen or fail to improve.    KATTY Brice " [facility]

## 2019-09-30 LAB — LA PPP-IMP: NEGATIVE

## 2019-09-30 NOTE — RESULT ENCOUNTER NOTE
Aleksey Hilaria  Your lupus test was negative.   Your hemoglobin was normal.  Your iron levels were normal.    Please call or MyChart my office with any questions or concerns.    Crissy Madrigal, PAC

## 2019-10-01 ENCOUNTER — ANTICOAGULATION THERAPY VISIT (OUTPATIENT)
Dept: NURSING | Facility: CLINIC | Age: 29
End: 2019-10-01
Payer: COMMERCIAL

## 2019-10-01 LAB
COPATH REPORT: NORMAL
INR POINT OF CARE: 6.2 (ref 0.86–1.14)
PAP: NORMAL

## 2019-10-01 PROCEDURE — 99207 ZZC NO CHARGE NURSE ONLY: CPT

## 2019-10-01 PROCEDURE — 36416 COLLJ CAPILLARY BLOOD SPEC: CPT

## 2019-10-01 PROCEDURE — 85610 PROTHROMBIN TIME: CPT | Mod: QW

## 2019-10-01 NOTE — PROGRESS NOTES
ANTICOAGULATION FOLLOW-UP CLINIC VISIT    Patient Name:  Hilaria Bonner  Date:  10/1/2019  Contact Type:  Face to Face    SUBJECTIVE:  Patient Findings     Positives:   Change in medications (diflucan, gababpentin), Other complaints (vomited some this weekend unsure if medications came up, after taking warfarin)    Comments:   Concurrent use of FLUCONAZOLE and WARFARIN may result in an increased risk of bleeding.         Clinical Outcomes     Negatives:   Major bleeding event, Thromboembolic event, Anticoagulation-related hospital admission, Anticoagulation-related ED visit, Anticoagulation-related fatality    Comments:   Concurrent use of FLUCONAZOLE and WARFARIN may result in an increased risk of bleeding.            OBJECTIVE    INR Protime   Date Value Ref Range Status   10/01/2019 6.2 (A) 0.86 - 1.14 Final       ASSESSMENT / PLAN  INR assessment SUPRA    Recheck INR In: 1 WEEK    INR Location Clinic      Anticoagulation Summary  As of 10/1/2019    INR goal:   2.0-3.0   TTR:   39.9 % (3.8 mo)   INR used for dosin.2! (10/1/2019)   Warfarin maintenance plan:   12 mg (4 mg x 3) every day   Full warfarin instructions:   10/1: Hold; 10/3: 8 mg; 10/5: 8 mg; 10/7: 8 mg; Otherwise 12 mg every day   Weekly warfarin total:   84 mg   Plan last modified:   Kait Manriquez, RN (2019)   Next INR check:   10/8/2019   Target end date:            Anticoagulation Episode Summary     INR check location:   Anticoagulation Clinic    Preferred lab:       Send INR reminders to:   BELIA BRADEN    Comments:         Anticoagulation Care Providers     Provider Role Specialty Phone number    Crissy Madrigal PA-C Referring [facility] [phone]            See the Encounter Report to view Anticoagulation Flowsheet and Dosing Calendar (Go to Encounters tab in chart review, and find the Anticoagulation Therapy Visit)    Dosage adjustment made based on physician directed care plan. [place] with Radha  [facility] and verified the dosing on calendar. Patient will call back to schedule as she is now at [facility].    Patient states negative for signs and symptoms of bleeding or blood clots, changes in medication, changes in diet, any signs of infection or antibiotic use, anything new OTC or herbal medications, any missed or extra doses of the warfarin.    Patient informed of the symptoms to be seen for either by INR nurse or ER.    Patient ask if refill of warfarin is needed. Rx sent no      Kait Manriquez RN

## 2019-10-02 ENCOUNTER — TELEPHONE (OUTPATIENT)
Dept: FAMILY MEDICINE | Facility: CLINIC | Age: 29
End: 2019-10-02

## 2019-10-02 NOTE — TELEPHONE ENCOUNTER
Reason for Call:  Other     Detailed comments: Wants call back is it ok to have home health nurse check INR for me on Monday. Cant come in on Tuesday because of school.  Please call back and advise.    Phone Number Patient can be reached at: Home number on file [phone] (home)    Best Time: any    Can we leave a detailed message on this number? YES    Call taken on 10/2/2019 at 4:52 PM by Jadyn Rick

## 2019-10-02 NOTE — TELEPHONE ENCOUNTER
Spoke with patient and she said home care nurse is coming out on 10/7/19 so INR will get done then.     Ayana Tay RN, BSN, PHN

## 2019-10-07 ENCOUNTER — ANTICOAGULATION THERAPY VISIT (OUTPATIENT)
Dept: NURSING | Facility: CLINIC | Age: 29
End: 2019-10-07
Payer: COMMERCIAL

## 2019-10-07 ENCOUNTER — TELEPHONE (OUTPATIENT)
Dept: FAMILY MEDICINE | Facility: CLINIC | Age: 29
End: 2019-10-07

## 2019-10-07 LAB — INR PPP: 6.5 (ref 0.9–1.1)

## 2019-10-07 PROCEDURE — 99207 ZZC NO CHARGE NURSE ONLY: CPT | Performed by: PHYSICIAN ASSISTANT

## 2019-10-07 NOTE — TELEPHONE ENCOUNTER
Reason for call:  INR   Who is calling? Caregiver    Phone number: [phone]    Fax number: n/a    Name of caller: Pennie    INR Value: 6.5    Are there any other concerns: Yes: patient states she was following dosing all week  Concerned its high, but patient is following instructions  Route this INR message to [facility] # [id number]    Phone number to reach patient:      Best Time:  any    Can we leave a detailed message on this number?  YES

## 2019-10-07 NOTE — TELEPHONE ENCOUNTER
Please see the October 7, 2019 Anticoagulation encounter for further information.  Kait Manriquez RN, [facility] Triage    Did call Joyce and informed of  Dosing and recheck.    Kait Manriquez RN, [facility] Triage

## 2019-10-07 NOTE — PROGRESS NOTES
ANTICOAGULATION FOLLOW-UP CLINIC VISIT    Patient Name:  Hilaria Bonner  Date:  10/7/2019  Contact Type:  Telephone    SUBJECTIVE:  Patient Findings     Positives:   Other complaints (burning with urination)        Clinical Outcomes     Negatives:   Major bleeding event, Thromboembolic event, Anticoagulation-related hospital admission, Anticoagulation-related ED visit, Anticoagulation-related fatality           OBJECTIVE    INR   Date Value Ref Range Status   10/07/2019 6.5 (A) 0.9 - 1.1 Final       ASSESSMENT / PLAN  INR assessment SUPRA    Recheck INR In: 3 DAYS    INR Location Homecare INR      Anticoagulation Summary  As of 10/7/2019    INR goal:   2.0-3.0   TTR:   37.9 % (4 mo)   INR used for dosin.5! (10/7/2019)   Warfarin maintenance plan:   8 mg (4 mg x 2) every day   Full warfarin instructions:   10/7: Hold; 10/8: Hold; Otherwise 8 mg every day   Weekly warfarin total:   56 mg   Plan last modified:   Kait Manriquez, RN (10/7/2019)   Next INR check:   10/10/2019   Target end date:            Anticoagulation Episode Summary     INR check location:   Anticoagulation Clinic    Preferred lab:       Send INR reminders to:   BELIA BRADEN    Comments:         Anticoagulation Care Providers     Provider Role Specialty Phone number    Crissy Madrigal PA-C Referring [facility] [phone]            See the Encounter Report to view Anticoagulation Flowsheet and Dosing Calendar (Go to Encounters tab in chart review, and find the Anticoagulation Therapy Visit)    Dosage adjustment made based on physician directed care plan. Decreased by 15% from the last 7 day dosing of 60 mg. Did huddle with Radha Ríos [facility] and venous blood draws are to be done. The patient is notified of that. Will contact [facility] to verify the dose adjustment.    Huddle with Radha Ríos [facility] and she would like half a dose given tomorrow so left detailed message for the patient to take 6 mg tomorrow.    Kait  ERIKA Manriquez RN

## 2019-10-10 ENCOUNTER — ANTICOAGULATION THERAPY VISIT (OUTPATIENT)
Dept: NURSING | Facility: CLINIC | Age: 29
End: 2019-10-10

## 2019-10-10 ENCOUNTER — MEDICAL CORRESPONDENCE (OUTPATIENT)
Dept: HEALTH INFORMATION MANAGEMENT | Facility: CLINIC | Age: 29
End: 2019-10-10

## 2019-10-10 ENCOUNTER — OFFICE VISIT (OUTPATIENT)
Dept: FAMILY MEDICINE | Facility: CLINIC | Age: 29
End: 2019-10-10
Payer: COMMERCIAL

## 2019-10-10 ENCOUNTER — MYC MEDICAL ADVICE (OUTPATIENT)
Dept: FAMILY MEDICINE | Facility: CLINIC | Age: 29
End: 2019-10-10

## 2019-10-10 ENCOUNTER — TELEPHONE (OUTPATIENT)
Dept: FAMILY MEDICINE | Facility: CLINIC | Age: 29
End: 2019-10-10

## 2019-10-10 VITALS
HEIGHT: 64 IN | RESPIRATION RATE: 20 BRPM | SYSTOLIC BLOOD PRESSURE: 136 MMHG | OXYGEN SATURATION: 98 % | HEART RATE: 112 BPM | TEMPERATURE: 98.1 F | BODY MASS INDEX: 42.17 KG/M2 | DIASTOLIC BLOOD PRESSURE: 87 MMHG | WEIGHT: 247 LBS

## 2019-10-10 DIAGNOSIS — N30.01 ACUTE CYSTITIS WITH HEMATURIA: Primary | ICD-10-CM

## 2019-10-10 DIAGNOSIS — N89.8 VAGINAL ODOR: ICD-10-CM

## 2019-10-10 DIAGNOSIS — B96.89 BACTERIAL VAGINOSIS: ICD-10-CM

## 2019-10-10 DIAGNOSIS — N76.0 BACTERIAL VAGINOSIS: ICD-10-CM

## 2019-10-10 DIAGNOSIS — R30.0 DYSURIA: ICD-10-CM

## 2019-10-10 DIAGNOSIS — R82.90 NONSPECIFIC FINDING ON EXAMINATION OF URINE: ICD-10-CM

## 2019-10-10 DIAGNOSIS — I26.99 PULMONARY EMBOLISM WITH INFARCTION (H): ICD-10-CM

## 2019-10-10 DIAGNOSIS — B37.31 YEAST VAGINITIS: ICD-10-CM

## 2019-10-10 LAB
ALBUMIN UR-MCNC: 30 MG/DL
APPEARANCE UR: ABNORMAL
BACTERIA #/AREA URNS HPF: ABNORMAL /HPF
BILIRUB UR QL STRIP: NEGATIVE
COLOR UR AUTO: YELLOW
GLUCOSE UR STRIP-MCNC: NEGATIVE MG/DL
HGB UR QL STRIP: ABNORMAL
INR PPP: 3.3 (ref 0.86–1.14)
KETONES UR STRIP-MCNC: NEGATIVE MG/DL
LEUKOCYTE ESTERASE UR QL STRIP: ABNORMAL
NITRATE UR QL: POSITIVE
PH UR STRIP: 7 PH (ref 5–7)
RBC #/AREA URNS AUTO: ABNORMAL /HPF
SOURCE: ABNORMAL
SP GR UR STRIP: 1.02 (ref 1–1.03)
SPECIMEN SOURCE: ABNORMAL
UROBILINOGEN UR STRIP-ACNC: 0.2 EU/DL (ref 0.2–1)
WBC #/AREA URNS AUTO: ABNORMAL /HPF
WET PREP SPEC: ABNORMAL

## 2019-10-10 PROCEDURE — 81001 URINALYSIS AUTO W/SCOPE: CPT | Performed by: PHYSICIAN ASSISTANT

## 2019-10-10 PROCEDURE — 87088 URINE BACTERIA CULTURE: CPT | Performed by: PHYSICIAN ASSISTANT

## 2019-10-10 PROCEDURE — 87186 SC STD MICRODIL/AGAR DIL: CPT | Performed by: PHYSICIAN ASSISTANT

## 2019-10-10 PROCEDURE — 99214 OFFICE O/P EST MOD 30 MIN: CPT | Performed by: PHYSICIAN ASSISTANT

## 2019-10-10 PROCEDURE — 87210 SMEAR WET MOUNT SALINE/INK: CPT | Performed by: PHYSICIAN ASSISTANT

## 2019-10-10 PROCEDURE — 36415 COLL VENOUS BLD VENIPUNCTURE: CPT | Performed by: PHYSICIAN ASSISTANT

## 2019-10-10 PROCEDURE — 87086 URINE CULTURE/COLONY COUNT: CPT | Performed by: PHYSICIAN ASSISTANT

## 2019-10-10 PROCEDURE — 87491 CHLMYD TRACH DNA AMP PROBE: CPT | Performed by: PHYSICIAN ASSISTANT

## 2019-10-10 PROCEDURE — 87591 N.GONORRHOEAE DNA AMP PROB: CPT | Performed by: PHYSICIAN ASSISTANT

## 2019-10-10 PROCEDURE — 85610 PROTHROMBIN TIME: CPT | Performed by: PHYSICIAN ASSISTANT

## 2019-10-10 RX ORDER — FLUCONAZOLE 150 MG/1
150 TABLET ORAL ONCE
Qty: 1 TABLET | Refills: 0 | Status: SHIPPED | OUTPATIENT
Start: 2019-10-10 | End: 2019-10-24

## 2019-10-10 RX ORDER — METRONIDAZOLE 7.5 MG/G
1 GEL VAGINAL 2 TIMES DAILY
Qty: 70 G | Refills: 0 | Status: SHIPPED | OUTPATIENT
Start: 2019-10-10 | End: 2019-12-30

## 2019-10-10 RX ORDER — CIPROFLOXACIN 250 MG/1
250 TABLET, FILM COATED ORAL 2 TIMES DAILY
Qty: 14 TABLET | Refills: 0 | Status: SHIPPED | OUTPATIENT
Start: 2019-10-10 | End: 2019-10-24

## 2019-10-10 ASSESSMENT — MIFFLIN-ST. JEOR: SCORE: 1835.38

## 2019-10-10 ASSESSMENT — PAIN SCALES - GENERAL: PAINLEVEL: NO PAIN (0)

## 2019-10-10 NOTE — TELEPHONE ENCOUNTER
Forms from [facility]-Plan of care placed on Dr. Davis desjacobo for completion.    Clare Butler     no

## 2019-10-10 NOTE — RESULT ENCOUNTER NOTE
Aleksey Manrique  Here are your wet prep and urine results as we reviewed at your visit.   Please call or MyChart my office with any questions or concerns.    Crissy Madrigal, PAC

## 2019-10-10 NOTE — RESULT ENCOUNTER NOTE
INR nurse please address - I placed on cipro today and gave diflucan in case develops yeast vaginitis

## 2019-10-10 NOTE — PATIENT INSTRUCTIONS
Take cipro 250 mg twice a day for 7 days- if you do not receive a call by the end of the day from Kait call us tomorrow AM     Use metrogel for bacterial vaginosis twice a day for 7 days  Take diflucan 150 mg for one dose if develop yeast infection     Schedule follow up with hematology at [facility] ([phone]) to discuss other anticoagulant to prevent a pulmonary embolus    Follow up with us in 3 days if urinary symptoms not improving.   Go to urgent care or Return urgently if any change in symptoms like abdominal pain, fever, vomiting, back pain or other change in symptoms

## 2019-10-10 NOTE — PROGRESS NOTES
ANTICOAGULATION FOLLOW-UP CLINIC VISIT    Patient Name:  Hilaria Bonner  Date:  10/10/2019  Contact Type:  Telephone    SUBJECTIVE:  Patient Findings     Positives:   Change in medications (Cipro for UTI, vaginal issues)        Clinical Outcomes     Negatives:   Major bleeding event, Thromboembolic event, Anticoagulation-related hospital admission, Anticoagulation-related ED visit, Anticoagulation-related fatality           OBJECTIVE    INR   Date Value Ref Range Status   10/10/2019 3.30 (H) 0.86 - 1.14 Final       ASSESSMENT / PLAN  INR assessment SUPRA    INR Location Clinic      Anticoagulation Summary  As of 10/10/2019    INR goal:   2.0-3.0   TTR:   36.9 % (4.1 mo)   INR used for dosing:   3.30! (10/10/2019)   Warfarin maintenance plan:   8 mg (4 mg x 2) every day   Full warfarin instructions:   10/10: 6 mg; 10/11: 6 mg; 10/12: 6 mg; Otherwise 8 mg every day   Weekly warfarin total:   56 mg   Plan last modified:   Kait Manriquez RN (10/7/2019)   Next INR check:   10/14/2019   Target end date:            Anticoagulation Episode Summary     INR check location:   Anticoagulation Clinic    Preferred lab:       Send INR reminders to:   BELIA BRADEN    Comments:         Anticoagulation Care Providers     Provider Role Specialty Phone number    Crissy Madrigal PA-C Referring [facility] [phone]            See the Encounter Report to view Anticoagulation Flowsheet and Dosing Calendar (Go to Encounters tab in chart review, and find the Anticoagulation Therapy Visit)    Dosage adjustment made based on physician directed care plan. Huddled with Radha Ríos [facility] and dosing recommended was 4 mg 10/10 and 10/11, then 6 mg 10/12 and 10/13 with recheck on 10/14 due to UTI and Cipro use. [email or web address] message sent to patient as when I called her she was trying to schedule with hematology.       Ayana Tay RN, BSN, PHN

## 2019-10-10 NOTE — PROGRESS NOTES
Subjective     Hilaria Bonner is a 28 year old female who presents to clinic today for the following health issues:    HPI   URINARY TRACT SYMPTOMS  Onset: Tuesday    Description:   Painful urination (Dysuria): YES           Frequency: YES  Blood in urine (Hematuria): YES  Delay in urine (Hesitency): YES    Intensity: moderate    Progression of Symptoms:  worsening    Accompanying Signs & Symptoms:  Fever/chills: no   Flank pain YES  Nausea and vomiting: no   Any vaginal symptoms: vaginal odor  Abdominal/Pelvic Pain: no     History:   History of frequent UTI's: no   History of kidney stones: no   Sexually Active: YES  Possibility of pregnancy: No    Precipitating factors:   Recently treated for vaginal yeast    Therapies Tried and outcome: fluids      Dysuria Started 2 days ago.  Had some side pain the other day which has resolved. No nausea or vomiting. No abdominal pain.   Has noted some blood the other day but no blood  In urine today  Complains of vaginal odor for some tiem  Last INR was 6.5 3 days ago.  Was on diflucan for yeast vaginitis needs Order for INR - specialist wanted drawn from vein  INR has been all over the place and believes started having problems since started eating green vegetables but in spite of keeping diet consistent we have not really been able to manage her INR  Has been back to work without problems    Patient Active Problem List   Diagnosis     Morbid obesity (H)     Vitamin D deficiency     Elevated parathyroid hormone     Encounter for smoking cessation counseling     Menorrhagia with irregular cycle     Morbid (severe) obesity due to excess calories (H)     Pulmonary embolism with infarction (H)     Past Surgical History:   Procedure Laterality Date     GYN SURGERY      2 C-sections     KNEE SURGERY  most recently - [id number]    3 surgeries in [place]     LAPAROSCOPIC GASTRIC SLEEVE N/A 3/26/2019    Procedure: Laparoscopic Sleeve Gastrectomy;  Surgeon: Luan Lopez MD;   Location: [facility] OR     ORTHOPEDIC SURGERY      2 knee meniscus surgery       Social History     Tobacco Use     Smoking status: Former Smoker     Years: 1.00     Start date: 2016     Last attempt to quit: 2018     Years since quittin.7     Smokeless tobacco: Never Used   Substance Use Topics     Alcohol use: Not Currently     Alcohol/week: 0.0 standard drinks     Comment: occasional     Family History   Problem Relation Age of Onset     Diabetes Father      Hypertension Father      Breast Cancer Sister 26        surgery only     Cancer Sister      Coronary Artery Disease Other         paternal aunt     Thyroid Disease Other         neice     Coronary Artery Disease Other      Cerebrovascular Disease Other      Breast Cancer Sister      Thyroid Disease Other      Cerebrovascular Disease No family hx of          Current Outpatient Medications   Medication Sig Dispense Refill     B Complex Vitamins (VITAMIN-B COMPLEX) TABS Take 1 tablet by mouth       Calcium Citrate-Vitamin D 500-500 MG-UNIT CHEW Take 1 chew tab by mouth 2 times daily 180 tablet 3     CHANTIX STARTING MONTH AMADO 0.5 MG X 11 & 1 MG X 42 tablet        childrens multivitamin w/iron (FLINTSTONES COMPLETE) 60 MG chewable tablet Take 1 chew tab by mouth daily              Cyanocobalamin (B-12) 500 MCG SUBL Place 1 tablet under the tongue daily 90 tablet 3     cyanocobalamin (VITAMIN B-12) 1000 MCG tablet Take 1,000 mcg by mouth       ferrous sulfate (FEROSUL) 325 (65 Fe) MG tablet Take 325 mg by mouth              fluticasone (FLONASE) 50 MCG/ACT nasal spray 2 sprays       folic acid (FOLVITE) 1 MG tablet        gabapentin (NEURONTIN) 300 MG capsule Take 1 capsule (300 mg) by mouth nightly as needed 90 capsule 1     magic mouthwash (FIRST-MOUTHWASH BLM) compounding kit Take 30 mLs by mouth every 6 hours as needed for mouth sores 900 mL 2            Multiple Vitamins-Minerals (MULTIVITAMIN ADULTS PO) Take 1 tablet by mouth daily       omeprazole  (PRILOSEC) 20 MG DR capsule Take 1 capsule (20 mg) by mouth 2 times daily 180 capsule 1     ondansetron (ZOFRAN-ODT) 4 MG ODT tab Take 1 tablet (4 mg) by mouth every 6 hours as needed for nausea or vomiting 12 tablet 1     polyethylene glycol (MIRALAX/GLYCOLAX) powder Take 17 g (1 capful) by mouth daily 850 g 3     topiramate (TOPAMAX) 25 MG tablet Take 3 tablets (75 mg) by mouth daily 90 tablet 3     ursodiol (ACTIGALL) 300 MG capsule Take 1 capsule (300 mg) by mouth 2 times daily 60 capsule 4     varenicline (CHANTIX AMADO) 0.5 MG X 11 & 1 MG X 42 tablet Take 0.5 mg tab daily for 3 days, THEN 0.5 mg tab twice daily for 4 days, THEN 1 mg twice daily. 53 tablet 0     vitamin  s/Minerals TABS Take 1 tablet by mouth       vitamin B complex with vitamin C (VITAMIN  B COMPLEX) tablet Take 1 tablet by mouth daily 90 tablet 1     vitamin C (ASCORBIC ACID) 1000 MG TABS Take 1,000 mg by mouth       vitamin D3 (CHOLECALCIFEROL) 2000 units tablet Take 1 tablet by mouth daily 90 tablet 1     warfarin (COUMADIN) 4 MG tablet Take 3 tablets by mouth daily or as directed by [facility] nurse. 270 tablet 0     enoxaparin (LOVENOX) 120 MG/0.8ML syringe Inject 0.8 mLs (120 mg) Subcutaneous every 12 hours (Patient not taking: Reported on 10/10/2019) 12 Syringe 1     BP Readings from Last 3 Encounters:   10/10/19 136/87   09/27/19 127/85   09/25/19 124/84    Wt Readings from Last 3 Encounters:   10/10/19 112 kg (247 lb)   09/27/19 114.3 kg (252 lb)   09/25/19 115.7 kg (255 lb)                      Reviewed and updated as needed this visit by Provider  Tobacco  Allergies  Meds  Problems  Med Hx  Surg Hx  Fam Hx  Soc Hx          Review of Systems   ROS COMP: Constitutional, HEENT, cardiovascular, pulmonary, gi and gu systems are negative, except as otherwise noted.      Objective    /87 (BP Location: Right arm, Patient Position: Chair, Cuff Size: Adult Large)   Pulse 112   Temp 98.1  F (36.7  C) (Oral)   Resp 20   Ht 1.626 m (5'  "4\")   Wt 112 kg (247 lb)   LMP  (Exact Date)   SpO2 98%   Breastfeeding? No   BMI 42.40 kg/m    Body mass index is 42.4 kg/m .  Physical Exam   GENERAL: alert, no distress and obese  NECK: no adenopathy, no asymmetry, masses, or scars and thyroid normal to palpation  RESP: lungs clear to auscultation - no rales, rhonchi or wheezes  CV: regular rate and rhythm, normal S1 S2, no S3 or S4, no murmur, click or rub, no peripheral edema and peripheral pulses strong  ABDOMEN: soft, nontender, no hepatosplenomegaly, no masses and bowel sounds normal   (female): normal female external genitalia, normal urethral meatus , vaginal mucosa pink, moist, well rugated, vaginal discharge - scant, yellow, milky and malodorous and normal cervix, adnexae, and uterus without masses.  MS: no gross musculoskeletal defects noted, no edema    Diagnostic Test Results:  Results for orders placed or performed in visit on 10/10/19   *UA reflex to Microscopic and Culture ([place] and [facility] (except [place] and [place])   Result Value Ref Range    Color Urine Yellow     Appearance Urine Cloudy     Glucose Urine Negative NEG^Negative mg/dL    Bilirubin Urine Negative NEG^Negative    Ketones Urine Negative NEG^Negative mg/dL    Specific Gravity Urine 1.020 1.003 - 1.035    Blood Urine Moderate (A) NEG^Negative    pH Urine 7.0 5.0 - 7.0 pH    Protein Albumin Urine 30 (A) NEG^Negative mg/dL    Urobilinogen Urine 0.2 0.2 - 1.0 EU/dL    Nitrite Urine Positive (A) NEG^Negative    Leukocyte Esterase Urine Large (A) NEG^Negative    Source Midstream Urine    Urine Microscopic   Result Value Ref Range    WBC Urine 25-50 (A) OTO5^0 - 5 /HPF    RBC Urine 5-10 (A) OTO2^O - 2 /HPF    Bacteria Urine Many (A) NEG^Negative /HPF   Wet prep   Result Value Ref Range    Specimen Description Vagina     Wet Prep No Trichomonas seen     Wet Prep No yeast seen     Wet Prep No WBC's seen     Wet Prep Clue cells seen  Many   (A)            Assessment & " "Plan     1. Acute cystitis with hematuria  Urinalysis suggests urinary tract infection - no evidence of pyelonephritis Will treat with cipro- will need close management of inr   - ciprofloxacin (CIPRO) 250 MG tablet; Take 1 tablet (250 mg) by mouth 2 times daily  Dispense: 14 tablet; Refill: 0    2. Pulmonary embolism with infarction (H)  Have not been able to consistently keep INR in range.  Referred to hematology to consider xarelto or lovenox indefinitely given risks with coumadin  - Oncology/Hematology Adult Referral; Future  - INR    3. Dysuria    - *UA reflex to Microscopic and Culture ([place] [facility] (except [place] and [place])  - Urine Microscopic    4. Nonspecific finding on examination of urine    - Urine Culture Aerobic Bacterial    5. Vaginal odor  Has bacterial vaginosis   - NEISSERIA GONORRHOEA PCR  - CHLAMYDIA TRACHOMATIS PCR  - Wet prep    6. Bacterial vaginosis  Given that we are treating urinary tract infection with oral antibiotic will treat with metrogel   - metroNIDAZOLE (METROGEL) 0.75 % vaginal gel; Place 1 applicator (5 g) vaginally 2 times daily for 7 days  Dispense: 70 g; Refill: 0    7. Yeast vaginitis  Given diflucan in case develops yeast infection with antibiotic - will need close monitoring of INR   - fluconazole (DIFLUCAN) 150 MG tablet; Take 1 tablet (150 mg) by mouth once for 1 dose  Dispense: 1 tablet; Refill: 0     BMI:   Estimated body mass index is 42.4 kg/m  as calculated from the following:    Height as of this encounter: 1.626 m (5' 4\").    Weight as of this encounter: 112 kg (247 lb).   Weight management plan: Discussed healthy diet and exercise guidelines        Patient Instructions   Take cipro 250 mg twice a day for 7 days- if you do not receive a call by the end of the day from [facility] call us tomorrow AM     Use metrogel for bacterial vaginosis twice a day for 7 days  Take diflucan 150 mg for one dose if develop yeast infection     Schedule follow up " with hematology at [facility] ([phone]) to discuss other anticoagulant to prevent a pulmonary embolus    Follow up with us in 3 days if urinary symptoms not improving.   Go to urgent care or Return urgently if any change in symptoms like abdominal pain, fever, vomiting, back pain or other change in symptoms           Return in about 3 days (around 10/13/2019), or if symptoms worsen or fail to improve.    Crissy Madrigal PA-C  [facility]

## 2019-10-11 ENCOUNTER — TELEPHONE (OUTPATIENT)
Dept: FAMILY MEDICINE | Facility: CLINIC | Age: 29
End: 2019-10-11

## 2019-10-11 ENCOUNTER — PATIENT OUTREACH (OUTPATIENT)
Dept: ONCOLOGY | Facility: CLINIC | Age: 29
End: 2019-10-11

## 2019-10-11 ENCOUNTER — TELEPHONE (OUTPATIENT)
Dept: ONCOLOGY | Facility: CLINIC | Age: 29
End: 2019-10-11

## 2019-10-11 DIAGNOSIS — I26.99 PULMONARY EMBOLISM WITH INFARCTION (H): Primary | ICD-10-CM

## 2019-10-11 LAB
C TRACH DNA SPEC QL NAA+PROBE: NEGATIVE
N GONORRHOEA DNA SPEC QL NAA+PROBE: NEGATIVE
SPECIMEN SOURCE: NORMAL
SPECIMEN SOURCE: NORMAL

## 2019-10-11 NOTE — TELEPHONE ENCOUNTER
Patient was sent [email or web address] message with this information this morning (10/11/19). This needs to be done via venipuncture.       Ayana Tay RN, BSN, PHN

## 2019-10-11 NOTE — TELEPHONE ENCOUNTER
[employer] message sent to patient with dosing for Monday as she cannot get INR rechecked on Monday. She will have INR done Tuesday 10/15/19.       Ayana Tay RN, BSN, PHN

## 2019-10-11 NOTE — PROGRESS NOTES
Referral requested from PCP to have patient seen for Hematology consult to discuss other anticoagulant to prevent pulmonary embolus.  The next opening for NP slot was November for a Hematologist at [facility].  This writer sent a message to Dr. Schaffer for review; he reviewed his chart, he advised the following:  I see that her INR has not been under good control and I see NO contraindication to switch to Xarelto     After reviewing her records she should be on Anticoagulation indefinitely.     If that's the only question, then she does not necessarily needs to see me, until and unless, the PCP strongly thinks otherwise     The above message was sent to GUY Benavides, who referred patient; message reviewed by her.

## 2019-10-11 NOTE — TELEPHONE ENCOUNTER
ONCOLOGY INTAKE: Records Information      APPT INFORMATION:  Referring provider:  Crissy Madrigal  Referring provider s clinic:  [facility]  Reason for visit/diagnosis:  Pulmonary embolism with infarction (H) [I26.99]  Has patient been notified of appointment date and time?: NA    RECORDS INFORMATION:  Were the records received with the referral (via [employer])? Internal Referral    ADDITIONAL INFORMATION:  Left VM with hours and phone. Letter sent for follow up. Referral in Epic

## 2019-10-11 NOTE — TELEPHONE ENCOUNTER
Reason for Call:  Other     Detailed comments: Patient wants to know if she needs to have her INR taken from her arm or a finger stick?    Phone Number Patient can be reached at: Cell number on file:    Telephone Information:   Mobile [phone]       Best Time: any    Can we leave a detailed message on this number? YES    Call taken on 10/11/2019 at 12:14 PM by Carina Noel

## 2019-10-11 NOTE — RESULT ENCOUNTER NOTE
Aleksey Manrique  Your gonorrhea and chlamydia tests are negative.   Please call or MyChart my office with any questions or concerns.    Crissy Madrigal, PAC

## 2019-10-12 LAB
BACTERIA SPEC CULT: ABNORMAL
SPECIMEN SOURCE: ABNORMAL

## 2019-10-12 NOTE — RESULT ENCOUNTER NOTE
Aleksey Manrique  Your urine culture shows bacteria that should respond to the antibiotic as prescribed.    Please call or MyChart my office with any questions or concerns.    Crissy Madrigal, PAC

## 2019-10-14 ENCOUNTER — TELEPHONE (OUTPATIENT)
Dept: FAMILY MEDICINE | Facility: CLINIC | Age: 29
End: 2019-10-14

## 2019-10-14 ENCOUNTER — DOCUMENTATION ONLY (OUTPATIENT)
Dept: CARE COORDINATION | Facility: CLINIC | Age: 29
End: 2019-10-14

## 2019-10-14 ENCOUNTER — MYC MEDICAL ADVICE (OUTPATIENT)
Dept: FAMILY MEDICINE | Facility: CLINIC | Age: 29
End: 2019-10-14

## 2019-10-14 DIAGNOSIS — I26.99 PULMONARY EMBOLISM WITH INFARCTION (H): Primary | ICD-10-CM

## 2019-10-14 NOTE — TELEPHONE ENCOUNTER
This writer attempted to contact [facility] on 10/14/19      Reason for call [facility] and left detailed message. [employer] message sent to patient as well.      If patient calls back:   Registered Nurse called. Follow Triage Call workflow        Ayana Tay RN, BSN, PHN

## 2019-10-14 NOTE — TELEPHONE ENCOUNTER
[place] forms placed on Dr. Greenfield desk for completion.    Carolann ESTRELLA, Patient Care

## 2019-10-14 NOTE — TELEPHONE ENCOUNTER
Please contact patient and advise that I have reviewed with hematology and given that we have had poor control of her INR we will switch from coumadin to xarelto 20 mg daily.  May discontinue coumadin and start xarelto 20 mg daily.  She will need to be on this lifelong.   prescription from pharmacy- in case a prior authorization is needed before discontinuing coumadin.    There is a risk of bleeding and not able to reverse as easily as coumadin but overall this is much safer than trying to yael her INR.  There are no food restrictions.

## 2019-10-15 ENCOUNTER — MEDICAL CORRESPONDENCE (OUTPATIENT)
Dept: HEALTH INFORMATION MANAGEMENT | Facility: CLINIC | Age: 29
End: 2019-10-15

## 2019-10-15 ENCOUNTER — TELEPHONE (OUTPATIENT)
Dept: FAMILY MEDICINE | Facility: CLINIC | Age: 29
End: 2019-10-15

## 2019-10-15 NOTE — TELEPHONE ENCOUNTER
This writer attempted to contact [facility] on 10/15/19      Reason for call answer questions and left message.      If patient calls back:   INR Registered Nurse called. Inform patient that someone from the INR group will contact them, document that pt called and route to [facility] [[id number]]        Ayana Tay RN, BSN, PHN

## 2019-10-15 NOTE — TELEPHONE ENCOUNTER
Reason for Call:  Other returning call    Detailed comments: Hilaria said she was returning a call about her new medication. She did not know the name of it just stated that she had some questions about it and that she was returning a call     Phone Number Patient can be reached at: Cell number on file:    Telephone Information:   Mobile [phone]       Best Time: Any    Can we leave a detailed message on this number? YES    Call taken on 10/15/2019 at 2:01 PM by Arron Black

## 2019-10-15 NOTE — TELEPHONE ENCOUNTER
Called and reviewed indications for xarelto 20 mg potential side effects and when to be seen in emergency department.   Will discontinue coumadin and take xarelto 20 mg daily for life   All questions were answered

## 2019-10-18 ENCOUNTER — ANCILLARY PROCEDURE (OUTPATIENT)
Dept: CARDIOLOGY | Facility: CLINIC | Age: 29
End: 2019-10-18
Attending: INTERNAL MEDICINE
Payer: COMMERCIAL

## 2019-10-18 ENCOUNTER — ANTICOAGULATION THERAPY VISIT (OUTPATIENT)
Dept: NURSING | Facility: CLINIC | Age: 29
End: 2019-10-18

## 2019-10-18 VITALS
BODY MASS INDEX: 39.94 KG/M2 | OXYGEN SATURATION: 98 % | SYSTOLIC BLOOD PRESSURE: 138 MMHG | HEART RATE: 98 BPM | DIASTOLIC BLOOD PRESSURE: 88 MMHG | WEIGHT: 248.5 LBS | HEIGHT: 66 IN

## 2019-10-18 DIAGNOSIS — I82.4Y9 DEEP VEIN THROMBOSIS (DVT) OF PROXIMAL LOWER EXTREMITY, UNSPECIFIED CHRONICITY, UNSPECIFIED LATERALITY (H): ICD-10-CM

## 2019-10-18 DIAGNOSIS — I47.20 VT (VENTRICULAR TACHYCARDIA) (H): ICD-10-CM

## 2019-10-18 DIAGNOSIS — I72.4 PSEUDOANEURYSM OF RIGHT FEMORAL ARTERY (H): ICD-10-CM

## 2019-10-18 DIAGNOSIS — I26.99 PULMONARY EMBOLISM (H): Primary | ICD-10-CM

## 2019-10-18 DIAGNOSIS — I26.99 PULMONARY EMBOLISM WITH INFARCTION (H): Primary | ICD-10-CM

## 2019-10-18 LAB
6 MIN WALK (FT): 1405 FT
6 MIN WALK (M): 428 M

## 2019-10-18 PROCEDURE — G0463 HOSPITAL OUTPT CLINIC VISIT: HCPCS | Mod: ZF

## 2019-10-18 PROCEDURE — 0296T ZIO PATCH HOLTER ADULT PEDIATRIC GREATER THAN 48 HRS: CPT | Mod: ZF

## 2019-10-18 PROCEDURE — 0298T ZZC EXT ECG > 48HR TO 21 DAY REVIEW AND INTERPRETATN: CPT | Mod: ZP | Performed by: INTERNAL MEDICINE

## 2019-10-18 PROCEDURE — 99214 OFFICE O/P EST MOD 30 MIN: CPT | Mod: ZP | Performed by: INTERNAL MEDICINE

## 2019-10-18 ASSESSMENT — ENCOUNTER SYMPTOMS
LOSS OF CONSCIOUSNESS: 0
DECREASED APPETITE: 1
DYSURIA: 1
DISTURBANCES IN COORDINATION: 1
MEMORY LOSS: 1
COUGH: 0
SPEECH CHANGE: 0
SYNCOPE: 0
PANIC: 0
SWOLLEN GLANDS: 0
HYPOTENSION: 0
WEIGHT LOSS: 1
NAIL CHANGES: 0
HEARTBURN: 1
WHEEZING: 0
BLOATING: 0
DIZZINESS: 1
NIGHT SWEATS: 1
POLYPHAGIA: 0
VOMITING: 1
CHILLS: 0
SEIZURES: 0
PARALYSIS: 0
COUGH DISTURBING SLEEP: 0
INCREASED ENERGY: 1
BLOOD IN STOOL: 0
TINGLING: 1
NERVOUS/ANXIOUS: 1
WEIGHT GAIN: 0
ORTHOPNEA: 0
HYPERTENSION: 0
STIFFNESS: 0
HEMATURIA: 1
DYSPNEA ON EXERTION: 0
CONSTIPATION: 0
TREMORS: 0
BOWEL INCONTINENCE: 0
MUSCLE WEAKNESS: 1
MUSCLE CRAMPS: 1
FLANK PAIN: 0
ALTERED TEMPERATURE REGULATION: 1
POSTURAL DYSPNEA: 0
BACK PAIN: 1
NUMBNESS: 1
DIARRHEA: 0
PALPITATIONS: 1
NAUSEA: 1
INSOMNIA: 1
FATIGUE: 1
BRUISES/BLEEDS EASILY: 1
LIGHT-HEADEDNESS: 1
HEADACHES: 1
WEAKNESS: 1
EXERCISE INTOLERANCE: 0
DECREASED CONCENTRATION: 1
DIFFICULTY URINATING: 0
SPUTUM PRODUCTION: 0
HEMOPTYSIS: 0
HALLUCINATIONS: 0
FEVER: 0
SNORES LOUDLY: 0
DEPRESSION: 1
POLYDIPSIA: 1
NECK PAIN: 0
SHORTNESS OF BREATH: 1
RECTAL PAIN: 1
LEG PAIN: 1
MYALGIAS: 1
ARTHRALGIAS: 1
SLEEP DISTURBANCES DUE TO BREATHING: 0
ABDOMINAL PAIN: 1
JOINT SWELLING: 0
JAUNDICE: 0
SKIN CHANGES: 0

## 2019-10-18 ASSESSMENT — MIFFLIN-ST. JEOR: SCORE: 1873.94

## 2019-10-18 ASSESSMENT — PAIN SCALES - GENERAL: PAINLEVEL: NO PAIN (0)

## 2019-10-18 NOTE — PATIENT INSTRUCTIONS
-echo in 6 months with [place] visit  -Ziopatch for palpitations  -follow up in [place] 6 months   -continue rivaroxaban indefinitely  -obtain lower extremity ultrasounds at your convenience  In next 2 weeks    Kandy Phelps, RN  Cardiology Care Coordinator  Please be aware that I work part-time but I will be checking messages several times per week.   For urgent needs, please call the number below.    [phone], press 1 for University, press 3 to speak to a nurse    .

## 2019-10-18 NOTE — LETTER
10/18/2019      RE: Hilaria Bonner  [address]  [address]  [facility] [address]       Dear Colleague,    Thank you for the opportunity to participate in the care of your patient, Hilaria Bonner, at the [facility] at [facility]. Please see a copy of my visit note below.         Vascular Cardiology Consultation    HPI: Hilaria Bonner is a 28 year old female with recent hx of massive PE with PEA arrest who is presenting for evaluation regarding ongoing management for PE.     Briefly in 05/2019 patient had a hospital cardiac arrest while at the ED, requiring > 50min of CPR with intermittent ROSC. At the time a bedside TTE showed RV dilation for which she received empiric full dose tPA and was subsequently transferred to [facility] for ongoing management. Hospital course was complicated ARAMIS (w/o need of RRT), ischemic colitis and acute blood los requiring blood transfusions. She was discharged on 05/14/2019. Prior to the event patient was driven from [place] to [place] with her mother. At a gas stop in [place] patient felt pain in her legs and few seconds later she collapsed. At the time of EMS arrival patient conscious and answering questions. En route to the hospital patient became more hypotensive  and was transferred to the nearest hospital, at which point she arrested.     Prior Visit: Ms. Bonner reports steady improvement in her functional capacity. She still reports generalized weakness and dyspnea with exertion when walking to long. Her biggest other at this time is chest pain associated with movement of torso, no assiciated with ambulation. She also reports headaches, nightmares and insomnia. Otherwise she currently denies any exertional chest pain, dyspnea at rest, leg swelling, orthopnea, PND muscles aches, joint aches, fevers, chills, new rashes or skin discoloration. She is still ambulating with a walker but feels symptoms are improving.  Tolerating medications ok.    Interval History:     Still reports bilaterally foot and leg pain, intermittent chest pain both at rest and with exertion but no LE edema. She is compliant with compression hosiery. She underwent right lower extremity DVT study last visit which was negative for DVT but had right PSA, so sent to ER. No intervention needed. Repeat echocardiogram with normal RV function. She did 6mwt today and had some SOB. Of note she was switched to rivaroxaban this week.        PAST MEDICAL HISTORY:     Past Medical History:   Diagnosis Date     Acute kidney injury (H) 05/13/2019     Cardiac arrest (H) 05/13/2019     Earache or other ear, nose, or throat complaint 12/15    tyroid     Pulmonary embolus (H) 05/13/2019        PAST SURGICAL HISTORY:     Past Surgical History:   Procedure Laterality Date     GYN SURGERY      2 C-sections     KNEE SURGERY  most recently - [id number]    3 surgeries in [place]     LAPAROSCOPIC GASTRIC SLEEVE N/A 3/26/2019    Procedure: Laparoscopic Sleeve Gastrectomy;  Surgeon: Luan Lopez MD;  Location: [facility] OR     ORTHOPEDIC SURGERY      2 knee meniscus surgery        CURRENT MEDICATIONS:     Current Outpatient Medications   Medication Sig Dispense Refill     B Complex Vitamins (VITAMIN-B COMPLEX) TABS Take 1 tablet by mouth       Calcium Citrate-Vitamin D 500-500 MG-UNIT CHEW Take 1 chew tab by mouth 2 times daily 180 tablet 3     CHANTIX STARTING MONTH AMADO 0.5 MG X 11 & 1 MG X 42 tablet        childrens multivitamin w/iron (FLINTSTONES COMPLETE) 60 MG chewable tablet Take 1 chew tab by mouth daily       ciprofloxacin (CIPRO) 250 MG tablet Take 1 tablet (250 mg) by mouth 2 times daily 14 tablet 0     Cyanocobalamin (B-12) 500 MCG SUBL Place 1 tablet under the tongue daily 90 tablet 3     cyanocobalamin (VITAMIN B-12) 1000 MCG tablet Take 1,000 mcg by mouth       ferrous sulfate (FEROSUL) 325 (65 Fe) MG tablet Take 325 mg by mouth       fluticasone (FLONASE) 50 MCG/ACT  nasal spray 2 sprays       folic acid (FOLVITE) 1 MG tablet        gabapentin (NEURONTIN) 300 MG capsule Take 1 capsule (300 mg) by mouth nightly as needed 90 capsule 1     magic mouthwash (FIRST-MOUTHWASH BLM) compounding kit Take 30 mLs by mouth every 6 hours as needed for mouth sores 900 mL 2     Multiple Vitamins-Minerals (MULTIVITAMIN ADULTS PO) Take 1 tablet by mouth daily       omeprazole (PRILOSEC) 20 MG DR capsule Take 1 capsule (20 mg) by mouth 2 times daily 180 capsule 1     ondansetron (ZOFRAN-ODT) 4 MG ODT tab Take 1 tablet (4 mg) by mouth every 6 hours as needed for nausea or vomiting 12 tablet 1     polyethylene glycol (MIRALAX/GLYCOLAX) powder Take 17 g (1 capful) by mouth daily 850 g 3     rivaroxaban ANTICOAGULANT (XARELTO ANTICOAGULANT) 20 MG TABS tablet Take 1 tablet (20 mg) by mouth daily (with dinner) 90 tablet 3     topiramate (TOPAMAX) 25 MG tablet Take 3 tablets (75 mg) by mouth daily 90 tablet 3     ursodiol (ACTIGALL) 300 MG capsule Take 1 capsule (300 mg) by mouth 2 times daily 60 capsule 4     varenicline (CHANTIX AMADO) 0.5 MG X 11 & 1 MG X 42 tablet Take 0.5 mg tab daily for 3 days, THEN 0.5 mg tab twice daily for 4 days, THEN 1 mg twice daily. 53 tablet 0     vitamin  s/Minerals TABS Take 1 tablet by mouth       vitamin B complex with vitamin C (VITAMIN  B COMPLEX) tablet Take 1 tablet by mouth daily 90 tablet 1     vitamin C (ASCORBIC ACID) 1000 MG TABS Take 1,000 mg by mouth       vitamin D3 (CHOLECALCIFEROL) 2000 units tablet Take 1 tablet by mouth daily 90 tablet 1     enoxaparin (LOVENOX) 120 MG/0.8ML syringe Inject 0.8 mLs (120 mg) Subcutaneous every 12 hours (Patient not taking: Reported on 10/10/2019) 12 Syringe 1        ALLERGIES:   No Known Allergies     FAMILY HISTORY:     Family History   Problem Relation Age of Onset     Diabetes Father      Hypertension Father      Breast Cancer Sister 26        surgery only     Cancer Sister      Coronary Artery Disease Other          paternal aunt     Thyroid Disease Other         neice     Coronary Artery Disease Other      Cerebrovascular Disease Other      Breast Cancer Sister      Thyroid Disease Other      Cerebrovascular Disease No family hx of         SOCIAL HISTORY:     Social History     Socioeconomic History     Marital status: Single     Spouse name: None     Number of children: None     Years of education: None     Highest education level: None   Occupational History     None   Social Needs     Financial resource strain: None     Food insecurity:     Worry: None     Inability: None     Transportation needs:     Medical: None     Non-medical: None   Tobacco Use     Smoking status: Former Smoker     Years: 1.00     Start date: 2016     Last attempt to quit: 2018     Years since quittin.4     Smokeless tobacco: Never Used   Substance and Sexual Activity     Alcohol use: Yes     Alcohol/week: 0.0 oz     Comment: occasional     Drug use: No     Sexual activity: Yes     Partners: Male   Lifestyle     Physical activity:     Days per week: None     Minutes per session: None     Stress: None   Relationships     Social connections:     Talks on phone: None     Gets together: None     Attends [employer] service: None     Active member of club or organization: None     Attends meetings of clubs or organizations: None     Relationship status: None     Intimate partner violence:     Fear of current or ex partner: None     Emotionally abused: None     Physically abused: None     Forced sexual activity: None   Other Topics Concern     Parent/sibling w/ CABG, MI or angioplasty before 65F 55M? Not Asked   Social History Narrative     None     Hilaria  reports previous alcohol use. and  reports that she quit smoking about 21 months ago. She started smoking about 2 years ago. She quit after 1.00 year of use. She has never used smokeless tobacco..     REVIEW OF SYSTEMS:   A comprehensive review of systems was performed and negative unless  "otherwise noted in the HPI above.      PHYSICAL EXAMINATION:   /88 (BP Location: Left arm, Patient Position: Chair, Cuff Size: Adult Regular)   Pulse 98   Ht 1.676 m (5' 6\")   Wt 112.7 kg (248 lb 8 oz)   SpO2 98%   BMI 40.11 kg/m     Body mass index is 40.11 kg/m .  Wt Readings from Last 2 Encounters:   10/18/19 112.7 kg (248 lb 8 oz)   10/10/19 112 kg (247 lb)     Constitutional: no acute distress, pleasant and cooperative, appears overall well.  Eyes:sclera white, conjunctiva clear, without icterus or pallor. Bruises noted underneath each eye   Ears/Nose/Mouth/Throat: Pinna, tragus, and external canal non-tender are normal, Nares patent b/l, moist mucous membranes. Echymosis with bump in the forehead in between eyes related to fall during collapse  Cardiovascular: RRR nl S1S2, JVP not elevated, mild edema R>L  Vasc: +2 radial, DP, PT pulses b/l   Respiratory: clear to auscultation and percussion bilaterally anterior and posterior  Gastrointestinal: soft, nontender, non distended, no hepatosplenomegaly or masses  Musculoskeletal: normal muscle bulk and tone, joints   Skin: normal skin appearance without worrisome lesions.   Neurologic: Alert and oriented, face symmetric, normal gait  Psychiatric: appropriate affect, eye contact, intact thought and speech       LABORATORY DATA:     LIPID RESULTS:  Recent Labs   Lab Test 01/12/18  0938 12/06/16  1121   CHOL 198 232*   HDL 71 70   LDL 90 142*   TRIG 183* 98        LIVER ENZYME RESULTS:  Recent Labs   Lab Test 05/28/19  1508 01/24/18  1122   AST 27 21   ALT 41 18       CBC RESULTS:  Recent Labs   Lab Test 06/04/19  1104 05/30/19  1550   WBC 9.2 8.5   HGB 9.6* 9.8*   HCT 30.8* 30.4*   * 593*       BMP RESULTS:  Recent Labs   Lab Test 06/04/19  1104 05/30/19  1550    144   POTASSIUM 3.5 3.0*   CHLORIDE 108 110*   CO2 26 25   ANIONGAP 8 10   GLC 95 100*   BUN 9 38*   CR 1.37* 3.14*   QUINCY 8.3* 7.6*       A1C RESULTS:  Lab Results   Component Value " Date    A1C 5.3 09/25/2019       INR RESULTS:  Recent Labs   Lab Test 06/07/19 06/03/19   INR 1.2* 2.0*          PROCEDURES & FURTHER ASSESSMENTS:     EKG: Sinus rhythm at 74bpm, non specific TW changes    ECHO:   05/14/2019    SUMMARY:   1. Normal global left ventricular systolic function.   2. Left ventricular ejection fraction, by visual estimation, is 70%.   3. Right ventricular size is severely enlarged. Right ventricular global systolic function is severely reduced.   4. Left ventricular diastolic function cannot be asessed due to E and A fusion caused by tachycardia.   5. Normal left ventricular chamber size.  In comparison to the previous echocardiogram(s):  There are no prior studies on this patient for comparison purposes.       CLINICAL IMPRESSION:     Hilaria Bonner is a 28 year old female with recent hx of massive PE with PEA arrest who is here for follow up of chronic PE management. Patient still has some residual dyspnea which is probably deconditioning as RV function normalized as did PA pressures, and no residual clot by CTPE to suggest CTEPH or RPVO.     In regards to possible underlining cause of PE, Ms. Bonner has multiple risk factors (smoking, obesity, hormonal birth control and family hx), given the extent of her embolism and  possible familial coagulopathy we would recommend life long anticoagulation at this time.     LE doppler negative for residual DVT, but will need follow up for PSA. Has palpitations lasting up to one hour, very strong. Would like to evaluate further.    PLAN:    -- Repeat CTPE was negative for residual clot to suggest RPVO or CTEPH, no need to repeat or obtain VQ scan at this time unless increased PA pressures in future  -- 6MWT done for baseline  -- Repeat US right lower extremity to evaluate for resolution of pseudoaneurysm   -- OK for rivaroxaban switch however with BMI there may be less therapeutic benefit (efficacy decreases in BMI > 40) will monitor closely   --  Reedopatch for palpitations  -- Follow up in 6 months at [facility]    Yelena Jones MD MSc  Division of Cardiology  [facility]

## 2019-10-18 NOTE — TELEPHONE ENCOUNTER
This writer attempted to contact [facility] on 10/18/19      Reason for call answer and left message. [email or web address] message sent as well to patient to see what specific questions she has.      If patient calls back:   INR Registered Nurse called. Inform patient that someone from the INR group will contact them, document that pt called and route to [facility] [[id number]]        Ayana Tay RN, BSN, PHN

## 2019-10-18 NOTE — PROGRESS NOTES
Vascular Cardiology Consultation    Referring Provider: Referred Self   Primary Care Provider: [facility]     Patient Name: Hilaria Bonner   MRN: [id number]     PERTINENT CLINICAL HISTORY:   Hilaria Bonner is a 28 year old female with recent hx of massive PE with PEA arrest who is presenting for evaluation regarding ongoing management for PE.     Briefly in 05/2019 patient had a hospital cardiac arrest while at the ED, requiring > 50min of CPR with intermittent ROSC. At the time a bedside TTE showed RV dilation for which she received empiric full dose tPA and was subsequently transferred to [facility] for ongoing management. Hospital course was complicated ARAMIS (w/o need of RRT), ischemic colitis and acute blood los requiring blood transfusions. She was discharged on 05/14/2019. Prior to the event patient was driven from [place] to [place] with her mother. At a gas stop in [place] patient felt pain in her legs and few seconds later she collapsed. At the time of EMS arrival patient conscious and answering questions. En route to the hospital patient became more hypotensive and was transferred to the nearest hospital, at which point she arrested.     Today, Ms. Bonner reports steady improvement in her functional capacity. She still reports generalized weakness and dyspnea with exertion when walking to long. Her biggest other at this time is chest pain associated with movement of torso, no assiciated with ambulation. She also reports headaches, nightmares and insomnia. Otherwise she currently denies any exertional chest pain, dyspnea at rest, leg swelling, orthopnea, PND muscles aches, joint aches, fevers, chills, new rashes or skin discoloration. She is still ambulating with a walker but feels symptoms are improving. Tolerating medications ok.    Interval:  -wearing compression everyday  -bilateral foot pain, legs feel   -had 6mwt today and had sob some days fatigued  -have  intermittent chest pain, substernal mid, sitting, standing walking  - feels pressure  -no LE edema   -right lower extremity DVT study was negative on follow up last visit but had a right PSA, was sent to ER, no intervention recommended.   -repeat echo with normal RV function  -c/o palpitations once a week stays for an hour and then resolves   -switched to rivaroxaban this week        PAST MEDICAL HISTORY:     Past Medical History:   Diagnosis Date     Acute kidney injury (H) 05/13/2019     Cardiac arrest (H) 05/13/2019     Earache or other ear, nose, or throat complaint 12/15    tyroid     Pulmonary embolus (H) 05/13/2019        PAST SURGICAL HISTORY:     Past Surgical History:   Procedure Laterality Date     GYN SURGERY      2 C-sections     KNEE SURGERY  most recently - [id number]    3 surgeries in [place]     LAPAROSCOPIC GASTRIC SLEEVE N/A 3/26/2019    Procedure: Laparoscopic Sleeve Gastrectomy;  Surgeon: Luan Lopez MD;  Location: [facility] OR     ORTHOPEDIC SURGERY      2 knee meniscus surgery        CURRENT MEDICATIONS:     Current Outpatient Medications   Medication Sig Dispense Refill     B Complex Vitamins (VITAMIN-B COMPLEX) TABS Take 1 tablet by mouth       Calcium Citrate-Vitamin D 500-500 MG-UNIT CHEW Take 1 chew tab by mouth 2 times daily 180 tablet 3     CHANTIX STARTING MONTH AMADO 0.5 MG X 11 & 1 MG X 42 tablet        childrens multivitamin w/iron (FLINTSTONES COMPLETE) 60 MG chewable tablet Take 1 chew tab by mouth daily       ciprofloxacin (CIPRO) 250 MG tablet Take 1 tablet (250 mg) by mouth 2 times daily 14 tablet 0     Cyanocobalamin (B-12) 500 MCG SUBL Place 1 tablet under the tongue daily 90 tablet 3     cyanocobalamin (VITAMIN B-12) 1000 MCG tablet Take 1,000 mcg by mouth       ferrous sulfate (FEROSUL) 325 (65 Fe) MG tablet Take 325 mg by mouth       fluticasone (FLONASE) 50 MCG/ACT nasal spray 2 sprays       folic acid (FOLVITE) 1 MG tablet        gabapentin (NEURONTIN) 300 MG capsule Take 1  capsule (300 mg) by mouth nightly as needed 90 capsule 1     magic mouthwash (FIRST-MOUTHWASH BLM) compounding kit Take 30 mLs by mouth every 6 hours as needed for mouth sores 900 mL 2     Multiple Vitamins-Minerals (MULTIVITAMIN ADULTS PO) Take 1 tablet by mouth daily       omeprazole (PRILOSEC) 20 MG DR capsule Take 1 capsule (20 mg) by mouth 2 times daily 180 capsule 1     ondansetron (ZOFRAN-ODT) 4 MG ODT tab Take 1 tablet (4 mg) by mouth every 6 hours as needed for nausea or vomiting 12 tablet 1     polyethylene glycol (MIRALAX/GLYCOLAX) powder Take 17 g (1 capful) by mouth daily 850 g 3     rivaroxaban ANTICOAGULANT (XARELTO ANTICOAGULANT) 20 MG TABS tablet Take 1 tablet (20 mg) by mouth daily (with dinner) 90 tablet 3     topiramate (TOPAMAX) 25 MG tablet Take 3 tablets (75 mg) by mouth daily 90 tablet 3     ursodiol (ACTIGALL) 300 MG capsule Take 1 capsule (300 mg) by mouth 2 times daily 60 capsule 4     varenicline (CHANTIX AMADO) 0.5 MG X 11 & 1 MG X 42 tablet Take 0.5 mg tab daily for 3 days, THEN 0.5 mg tab twice daily for 4 days, THEN 1 mg twice daily. 53 tablet 0     vitamin  s/Minerals TABS Take 1 tablet by mouth       vitamin B complex with vitamin C (VITAMIN  B COMPLEX) tablet Take 1 tablet by mouth daily 90 tablet 1     vitamin C (ASCORBIC ACID) 1000 MG TABS Take 1,000 mg by mouth       vitamin D3 (CHOLECALCIFEROL) 2000 units tablet Take 1 tablet by mouth daily 90 tablet 1     enoxaparin (LOVENOX) 120 MG/0.8ML syringe Inject 0.8 mLs (120 mg) Subcutaneous every 12 hours (Patient not taking: Reported on 10/10/2019) 12 Syringe 1        ALLERGIES:   No Known Allergies     FAMILY HISTORY:     Family History   Problem Relation Age of Onset     Diabetes Father      Hypertension Father      Breast Cancer Sister 26        surgery only     Cancer Sister      Coronary Artery Disease Other         paternal aunt     Thyroid Disease Other         neice     Coronary Artery Disease Other      Cerebrovascular Disease  Other      Breast Cancer Sister      Thyroid Disease Other      Cerebrovascular Disease No family hx of         SOCIAL HISTORY:     Social History     Socioeconomic History     Marital status: Single     Spouse name: None     Number of children: None     Years of education: None     Highest education level: None   Occupational History     None   Social Needs     Financial resource strain: None     Food insecurity:     Worry: None     Inability: None     Transportation needs:     Medical: None     Non-medical: None   Tobacco Use     Smoking status: Former Smoker     Years: 1.00     Start date: 2016     Last attempt to quit: 2018     Years since quittin.4     Smokeless tobacco: Never Used   Substance and Sexual Activity     Alcohol use: Yes     Alcohol/week: 0.0 oz     Comment: occasional     Drug use: No     Sexual activity: Yes     Partners: Male   Lifestyle     Physical activity:     Days per week: None     Minutes per session: None     Stress: None   Relationships     Social connections:     Talks on phone: None     Gets together: None     Attends [employer] service: None     Active member of club or organization: None     Attends meetings of clubs or organizations: None     Relationship status: None     Intimate partner violence:     Fear of current or ex partner: None     Emotionally abused: None     Physically abused: None     Forced sexual activity: None   Other Topics Concern     Parent/sibling w/ CABG, MI or angioplasty before 65F 55M? Not Asked   Social History Narrative     None     Hilaria  reports previous alcohol use. and  reports that she quit smoking about 21 months ago. She started smoking about 2 years ago. She quit after 1.00 year of use. She has never used smokeless tobacco..     REVIEW OF SYSTEMS:   A comprehensive review of systems was performed and negative unless otherwise noted in the HPI above.      PHYSICAL EXAMINATION:   /88 (BP Location: Left arm, Patient Position:  "Chair, Cuff Size: Adult Regular)   Pulse 98   Ht 1.676 m (5' 6\")   Wt 112.7 kg (248 lb 8 oz)   SpO2 98%   BMI 40.11 kg/m    Body mass index is 40.11 kg/m .  Wt Readings from Last 2 Encounters:   10/18/19 112.7 kg (248 lb 8 oz)   10/10/19 112 kg (247 lb)     Constitutional: no acute distress, pleasant and cooperative, appears overall well.  Eyes:sclera white, conjunctiva clear, without icterus or pallor. Bruises noted underneath each eye   Ears/Nose/Mouth/Throat: Pinna, tragus, and external canal non-tender are normal, Nares patent b/l, moist mucous membranes. Echymosis with bump in the forehead in between eyes related to fall during collapse  Cardiovascular: RRR nl S1S2, JVP not elevated, mild edema R>L  Vasc: +2 radial, DP, PT pulses b/l   Respiratory: clear to auscultation and percussion bilaterally anterior and posterior  Gastrointestinal: soft, nontender, non distended, no hepatosplenomegaly or masses  Musculoskeletal: normal muscle bulk and tone, joints   Skin: normal skin appearance without worrisome lesions.   Neurologic: Alert and oriented, face symmetric, normal gait  Psychiatric: appropriate affect, eye contact, intact thought and speech       LABORATORY DATA:     LIPID RESULTS:  Recent Labs   Lab Test 01/12/18  0938 12/06/16  1121   CHOL 198 232*   HDL 71 70   LDL 90 142*   TRIG 183* 98        LIVER ENZYME RESULTS:  Recent Labs   Lab Test 05/28/19  1508 01/24/18  1122   AST 27 21   ALT 41 18       CBC RESULTS:  Recent Labs   Lab Test 06/04/19  1104 05/30/19  1550   WBC 9.2 8.5   HGB 9.6* 9.8*   HCT 30.8* 30.4*   * 593*       BMP RESULTS:  Recent Labs   Lab Test 06/04/19  1104 05/30/19  1550    144   POTASSIUM 3.5 3.0*   CHLORIDE 108 110*   CO2 26 25   ANIONGAP 8 10   GLC 95 100*   BUN 9 38*   CR 1.37* 3.14*   QUINCY 8.3* 7.6*       A1C RESULTS:  Lab Results   Component Value Date    A1C 5.3 09/25/2019       INR RESULTS:  Recent Labs   Lab Test 06/07/19 06/03/19   INR 1.2* 2.0*          " PROCEDURES & FURTHER ASSESSMENTS:     EKG: Sinus rhythm at 74bpm, non specific TW changes    ECHO:   05/14/2019    SUMMARY:   1. Normal global left ventricular systolic function.   2. Left ventricular ejection fraction, by visual estimation, is 70%.   3. Right ventricular size is severely enlarged. Right ventricular global systolic function is severely reduced.   4. Left ventricular diastolic function cannot be asessed due to E and A fusion caused by tachycardia.   5. Normal left ventricular chamber size.  In comparison to the previous echocardiogram(s):  There are no prior studies on this patient for comparison purposes.       CLINICAL IMPRESSION:     Hilaria Bonner is a 28 year old female with recent hx of massive PE with PEA arrest who is presenting for establishment of care and chronic PE management. Patient still has some residual dyspnea which could be related to residual clot burden vs deconditioning vs persistent anemia. In regards to possible underlining cause of PE, Ms. Bonner has multiple risk factors (smoking, obesity, hormonal birth control and family hx), given the extent of her embolism and  possible familial coagulopathy we would recommend life long anticoagulation at this time. Finally although Ms. Bonner is making steady physical recovery I am concern that she may be developing some PTSD due to her arrest, we recommended neuropsych evaluation but she would like to wait for now.    On repeat testing CTPE negative for residual clot burden and echocardiogram with normal RV function and PA pressures. LE doppler negative for residual DVT. Has palpitations lasting up to one hour, very strong. Would like to evaluate further     PLAN:    -- Continue warfarin indefinitely, goal INR 2-3  -- Neuropsych once patient is ready  -- Repeat CTPE was negative, no need to repeat or obtain VQ scan at this time unless increased PA pressures in future  -- 6MWT done for baseline  -- Repeat US right lower extremity to  evaluate for resolution of pseudoaneurysm   -- OK for rivaroxaban switch however with BMI there may be less therapeutic benefit (efficacy decreases in BMI > 40) will monitor closely   -- Follow up in 6 months at [facility]

## 2019-10-18 NOTE — PROGRESS NOTES
Per Dr. Jones, patient to have 14 day Ziopatch  monitor placed.  Diagnosis: Ventricular tachycardia  Monitor placed: Yes  Patient Instructed: Yes  Patient verbalized understanding: Yes  Holter # [id number]    Placed By Kimberly ANGELES

## 2019-10-21 ENCOUNTER — PATIENT OUTREACH (OUTPATIENT)
Dept: CARE COORDINATION | Facility: CLINIC | Age: 29
End: 2019-10-21

## 2019-10-21 NOTE — PROGRESS NOTES
Clinic Care Coordination Contact    Follow Up Progress Note      Assessment: Patient has followed up with PCP and cardiology during this review period.    Patient has been changed from coumadin to Xarelto for anticoagulation. She will need to remain on this for her lifetime.     Patient has not responded to messages left by care coordinator.     Goals addressed this encounter:  Goal is met with healing of ribs.   Goals Addressed                 This Visit's Progress      COMPLETED: Improve chronic symptoms (pt-stated)        Goal Statement: I want the chest pains to go away    Measure of Success: When my chest no longer hurts    Supportive Steps to Achieve: Tylenol, ice or heat for comfort    Barriers: Multiple fractured ribs due to CPR    Strengths: Age    Date to Achieve By: November 2019    Patient expressed understanding of goal: Yes          Intervention/Education provided during outreach: na     Plan:   Patient will continue to follow with PCP and specialists as needed     Care Coordinator will close at this time. Re-open as needed.     Monica Hernandez RN, [facility] RN Coordinator  [facility]   10/21/2019    3:15 PM  [phone]

## 2019-10-22 NOTE — TELEPHONE ENCOUNTER
Closing encounter; patient is no longer on warfarin and has not responded to calls or OnSwipehart messages.       Ayana Tay RN, BSN, PHN

## 2019-10-24 ENCOUNTER — ANCILLARY PROCEDURE (OUTPATIENT)
Dept: GENERAL RADIOLOGY | Facility: CLINIC | Age: 29
End: 2019-10-24
Attending: PHYSICIAN ASSISTANT
Payer: COMMERCIAL

## 2019-10-24 ENCOUNTER — OFFICE VISIT (OUTPATIENT)
Dept: FAMILY MEDICINE | Facility: CLINIC | Age: 29
End: 2019-10-24
Payer: COMMERCIAL

## 2019-10-24 VITALS
HEART RATE: 100 BPM | RESPIRATION RATE: 18 BRPM | OXYGEN SATURATION: 100 % | WEIGHT: 247 LBS | TEMPERATURE: 98 F | HEIGHT: 66 IN | DIASTOLIC BLOOD PRESSURE: 78 MMHG | BODY MASS INDEX: 39.7 KG/M2 | SYSTOLIC BLOOD PRESSURE: 128 MMHG

## 2019-10-24 DIAGNOSIS — S93.401D MODERATE ANKLE SPRAIN, RIGHT, SUBSEQUENT ENCOUNTER: ICD-10-CM

## 2019-10-24 DIAGNOSIS — M25.571 ACUTE RIGHT ANKLE PAIN: Primary | ICD-10-CM

## 2019-10-24 DIAGNOSIS — M25.571 ACUTE RIGHT ANKLE PAIN: ICD-10-CM

## 2019-10-24 PROCEDURE — 99213 OFFICE O/P EST LOW 20 MIN: CPT | Performed by: PHYSICIAN ASSISTANT

## 2019-10-24 PROCEDURE — 73610 X-RAY EXAM OF ANKLE: CPT | Mod: RT

## 2019-10-24 RX ORDER — HYDROCODONE BITARTRATE AND ACETAMINOPHEN 5; 325 MG/1; MG/1
1 TABLET ORAL EVERY 6 HOURS PRN
Qty: 10 TABLET | Refills: 0 | Status: SHIPPED | OUTPATIENT
Start: 2019-10-24 | End: 2019-12-30

## 2019-10-24 ASSESSMENT — MIFFLIN-ST. JEOR: SCORE: 1867.13

## 2019-10-24 ASSESSMENT — PAIN SCALES - GENERAL: PAINLEVEL: SEVERE PAIN (7)

## 2019-10-24 NOTE — RESULT ENCOUNTER NOTE
Aleksey Manrique  The radiologist read your ankle xray as normal as well.  Please call or MyChart my office with any questions or concerns.    Crissy Madrigal, PAC

## 2019-10-24 NOTE — PATIENT INSTRUCTIONS
Wear splint and use crutches.   Follow up with orthopedics in approximately 7-10 days   Return urgently if any change in symptoms like increasing pain, swelling, numbness or other change in symptoms   Use vicodin sparingly as needed for pain- recommend only taking at night   Continue with tylenol as needed for pain   Elevate ankle as much as possible.  Apply ice 15-20 minutes at a time several times a day

## 2019-10-24 NOTE — PROGRESS NOTES
Subjective     Hilaria Bnoner is a 28 year old female who presents to clinic today for the following health issues:    HPI   ED/UC Followup:    Facility:  [facility]  Date of visit: 10/22/19  Reason for visit: ankle sprain  Current Status: still hurting       Joint Pain    Onset: 10/22    Description:   Location: right ankle  Character: Dull ache    Intensity: 8/10    Progression of Symptoms: same    Accompanying Signs & Symptoms:  Other symptoms: swelling    History:   Previous similar pain: no       Precipitating factors:   Trauma or overuse: YES    Alleviating factors:  Improved by: nothing    Therapies Tried and outcome: tylenol    Patient well known to me. On anticoagulant for massive PE and cardiac arrest.  Recently saw cardiology and reported rapid heart rate and is wearing ziopatch holter monitor   Seen at [facility] emergency department 2 days ago with right ankle sprain.  No history of trauma but was walking a lot at job.  History of recurrent right ankle sprain and would like follow up with orthopedics   Pain unchanged but swelling has improved.  Told to take tylenol but not working and requesting something else for pain  Was not given a splint.  Was given Crutches but one broke-   Hurts to bear any weight on right foot.     Recently switched her from coumadin to xarelto   No side effects with xarelto  Requests note excusing from work - since unable to bear weight      Patient Active Problem List   Diagnosis     Morbid obesity (H)     Vitamin D deficiency     Elevated parathyroid hormone     Encounter for smoking cessation counseling     Menorrhagia with irregular cycle     Morbid (severe) obesity due to excess calories (H)     Pulmonary embolism with infarction (H)     Past Surgical History:   Procedure Laterality Date     GYN SURGERY      2 C-sections     KNEE SURGERY  most recently - [id number]    3 surgeries in [place]     LAPAROSCOPIC GASTRIC SLEEVE N/A 3/26/2019    Procedure: Laparoscopic  Sleeve Gastrectomy;  Surgeon: Luan Lopez MD;  Location: [facility] OR     ORTHOPEDIC SURGERY      2 knee meniscus surgery       Social History     Tobacco Use     Smoking status: Former Smoker     Years: 1.00     Start date: 2016     Last attempt to quit: 2018     Years since quittin.7     Smokeless tobacco: Never Used   Substance Use Topics     Alcohol use: Not Currently     Alcohol/week: 0.0 standard drinks     Comment: occasional     Family History   Problem Relation Age of Onset     Diabetes Father      Hypertension Father      Breast Cancer Sister 26        surgery only     Cancer Sister      Coronary Artery Disease Other         paternal aunt     Thyroid Disease Other         neice     Coronary Artery Disease Other      Cerebrovascular Disease Other      Breast Cancer Sister      Thyroid Disease Other      Cerebrovascular Disease No family hx of          Current Outpatient Medications   Medication Sig Dispense Refill     B Complex Vitamins (VITAMIN-B COMPLEX) TABS Take 1 tablet by mouth       Calcium Citrate-Vitamin D 500-500 MG-UNIT CHEW Take 1 chew tab by mouth 2 times daily 180 tablet 3     CHANTIX STARTING MONTH AMADO 0.5 MG X 11 & 1 MG X 42 tablet        childrens multivitamin w/iron (FLINTSTONES COMPLETE) 60 MG chewable tablet Take 1 chew tab by mouth daily       Cyanocobalamin (B-12) 500 MCG SUBL Place 1 tablet under the tongue daily 90 tablet 3     cyanocobalamin (VITAMIN B-12) 1000 MCG tablet Take 1,000 mcg by mouth       ferrous sulfate (FEROSUL) 325 (65 Fe) MG tablet Take 325 mg by mouth       fluticasone (FLONASE) 50 MCG/ACT nasal spray 2 sprays       folic acid (FOLVITE) 1 MG tablet        gabapentin (NEURONTIN) 300 MG capsule Take 1 capsule (300 mg) by mouth nightly as needed 90 capsule 1     HYDROcodone-acetaminophen (NORCO) 5-325 MG tablet Take 1 tablet by mouth every 6 hours as needed for severe pain 10 tablet 0     magic mouthwash (FIRST-MOUTHWASH BLM) compounding kit Take 30  mLs by mouth every 6 hours as needed for mouth sores 900 mL 2     Multiple Vitamins-Minerals (MULTIVITAMIN ADULTS PO) Take 1 tablet by mouth daily       omeprazole (PRILOSEC) 20 MG DR capsule Take 1 capsule (20 mg) by mouth 2 times daily 180 capsule 1     ondansetron (ZOFRAN-ODT) 4 MG ODT tab Take 1 tablet (4 mg) by mouth every 6 hours as needed for nausea or vomiting 12 tablet 1     order for DME by Device route continuous Crutches - use as instructed 1 Device 0     polyethylene glycol (MIRALAX/GLYCOLAX) powder Take 17 g (1 capful) by mouth daily 850 g 3     rivaroxaban ANTICOAGULANT (XARELTO ANTICOAGULANT) 20 MG TABS tablet Take 1 tablet (20 mg) by mouth daily (with dinner) 90 tablet 3     topiramate (TOPAMAX) 25 MG tablet Take 3 tablets (75 mg) by mouth daily 90 tablet 3     ursodiol (ACTIGALL) 300 MG capsule Take 1 capsule (300 mg) by mouth 2 times daily 60 capsule 4     varenicline (CHANTIX AMADO) 0.5 MG X 11 & 1 MG X 42 tablet Take 0.5 mg tab daily for 3 days, THEN 0.5 mg tab twice daily for 4 days, THEN 1 mg twice daily. 53 tablet 0     vitamin  s/Minerals TABS Take 1 tablet by mouth       vitamin B complex with vitamin C (VITAMIN  B COMPLEX) tablet Take 1 tablet by mouth daily 90 tablet 1     vitamin C (ASCORBIC ACID) 1000 MG TABS Take 1,000 mg by mouth       vitamin D3 (CHOLECALCIFEROL) 2000 units tablet Take 1 tablet by mouth daily 90 tablet 1     BP Readings from Last 3 Encounters:   10/24/19 128/78   10/18/19 138/88   10/10/19 136/87    Wt Readings from Last 3 Encounters:   10/24/19 112 kg (247 lb)   10/18/19 112.7 kg (248 lb 8 oz)   10/10/19 112 kg (247 lb)                      Reviewed and updated as needed this visit by Provider  Tobacco  Allergies  Meds  Problems  Med Hx  Surg Hx  Fam Hx  Soc Hx          Review of Systems   ROS COMP: Constitutional, HEENT, cardiovascular, pulmonary, gi and gu systems are negative, except as otherwise noted.      Objective    /78 (BP Location: Right arm,  "Patient Position: Chair, Cuff Size: Adult Large)   Pulse 100   Temp 98  F (36.7  C) (Oral)   Resp 18   Ht 1.676 m (5' 6\")   Wt 112 kg (247 lb)   LMP  (Exact Date)   SpO2 100%   Breastfeeding? No   BMI 39.87 kg/m    Body mass index is 39.87 kg/m .  Physical Exam   GENERAL: healthy, alert and no distress  NECK: no adenopathy, no asymmetry, masses, or scars and thyroid normal to palpation  RESP: lungs clear to auscultation - no rales, rhonchi or wheezes  CV: regular rate and rhythm, normal S1 S2, no S3 or S4, no murmur, click or rub, no peripheral edema and peripheral pulses strong  MS: right ankle with edema and tenderness medial malleolus   Normal dorsalis pedis pulse   Antalgic gait- wearing slippers and is weight bearing     Diagnostic Test Results:  Xray - right ankle with no obvious fracture.  Official radiology report pending         Assessment & Plan     1. Acute right ankle pain  Encouraged vicodin sparingly as needed for pain. Encouraged no weight bearing and given ankle splint.  Follow up with orthopedics for ankle sprain- she reports recurring  - XR Ankle Right G/E 3 Views; Future  - ORTHOPEDICS ADULT REFERRAL  - order for DME; by Device route continuous Crutches - use as instructed  Dispense: 1 Device; Refill: 0  - HYDROcodone-acetaminophen (NORCO) 5-325 MG tablet; Take 1 tablet by mouth every 6 hours as needed for severe pain  Dispense: 10 tablet; Refill: 0    2. Moderate ankle sprain, right, subsequent encounter  As above   - order for DME; by Device route continuous Crutches - use as instructed  Dispense: 1 Device; Refill: 0  - HYDROcodone-acetaminophen (NORCO) 5-325 MG tablet; Take 1 tablet by mouth every 6 hours as needed for severe pain  Dispense: 10 tablet; Refill: 0     BMI:   Estimated body mass index is 39.87 kg/m  as calculated from the following:    Height as of this encounter: 1.676 m (5' 6\").    Weight as of this encounter: 112 kg (247 lb).   Weight management plan: Discussed healthy " diet and exercise guidelines        Patient Instructions   Wear splint and use crutches.   Follow up with orthopedics in approximately 7-10 days   Return urgently if any change in symptoms like increasing pain, swelling, numbness or other change in symptoms   Use vicodin sparingly as needed for pain- recommend only taking at night   Continue with tylenol as needed for pain   Elevate ankle as much as possible.  Apply ice 15-20 minutes at a time several times a day       Return in about 10 days (around 11/3/2019), or if symptoms worsen or fail to improve.    Crissy Madrigal PA-C  [facility]

## 2019-10-27 NOTE — PROGRESS NOTES
Vascular Cardiology Consultation    HPI: Hilaria Bonner is a 28 year old female with recent hx of massive PE with PEA arrest who is presenting for evaluation regarding ongoing management for PE.     Briefly in 05/2019 patient had a hospital cardiac arrest while at the ED, requiring > 50min of CPR with intermittent ROSC. At the time a bedside TTE showed RV dilation for which she received empiric full dose tPA and was subsequently transferred to [facility] for ongoing management. Hospital course was complicated ARAMIS (w/o need of RRT), ischemic colitis and acute blood los requiring blood transfusions. She was discharged on 05/14/2019. Prior to the event patient was driven from [place] to [place] with her mother. At a gas stop in [place] patient felt pain in her legs and few seconds later she collapsed. At the time of EMS arrival patient conscious and answering questions. En route to the hospital patient became more hypotensive  and was transferred to the nearest hospital, at which point she arrested.     Prior Visit: Ms. Bonner reports steady improvement in her functional capacity. She still reports generalized weakness and dyspnea with exertion when walking to long. Her biggest other at this time is chest pain associated with movement of torso, no assiciated with ambulation. She also reports headaches, nightmares and insomnia. Otherwise she currently denies any exertional chest pain, dyspnea at rest, leg swelling, orthopnea, PND muscles aches, joint aches, fevers, chills, new rashes or skin discoloration. She is still ambulating with a walker but feels symptoms are improving. Tolerating medications ok.    Interval History:     Still reports bilaterally foot and leg pain, intermittent chest pain both at rest and with exertion but no LE edema. She is compliant with compression hosiery. She underwent right lower extremity DVT study last visit which was negative for DVT but had right PSA, so sent to ER.  No intervention needed. Repeat echocardiogram with normal RV function. She did 6mwt today and had some SOB. Of note she was switched to rivaroxaban this week.        PAST MEDICAL HISTORY:     Past Medical History:   Diagnosis Date     Acute kidney injury (H) 05/13/2019     Cardiac arrest (H) 05/13/2019     Earache or other ear, nose, or throat complaint 12/15    tyroid     Pulmonary embolus (H) 05/13/2019        PAST SURGICAL HISTORY:     Past Surgical History:   Procedure Laterality Date     GYN SURGERY      2 C-sections     KNEE SURGERY  most recently - [id number]    3 surgeries in [place]     LAPAROSCOPIC GASTRIC SLEEVE N/A 3/26/2019    Procedure: Laparoscopic Sleeve Gastrectomy;  Surgeon: Luan Lopez MD;  Location: [facility] OR     ORTHOPEDIC SURGERY      2 knee meniscus surgery        CURRENT MEDICATIONS:     Current Outpatient Medications   Medication Sig Dispense Refill     B Complex Vitamins (VITAMIN-B COMPLEX) TABS Take 1 tablet by mouth       Calcium Citrate-Vitamin D 500-500 MG-UNIT CHEW Take 1 chew tab by mouth 2 times daily 180 tablet 3     CHANTIX STARTING MONTH AMADO 0.5 MG X 11 & 1 MG X 42 tablet        childrens multivitamin w/iron (FLINTSTONES COMPLETE) 60 MG chewable tablet Take 1 chew tab by mouth daily       ciprofloxacin (CIPRO) 250 MG tablet Take 1 tablet (250 mg) by mouth 2 times daily 14 tablet 0     Cyanocobalamin (B-12) 500 MCG SUBL Place 1 tablet under the tongue daily 90 tablet 3     cyanocobalamin (VITAMIN B-12) 1000 MCG tablet Take 1,000 mcg by mouth       ferrous sulfate (FEROSUL) 325 (65 Fe) MG tablet Take 325 mg by mouth       fluticasone (FLONASE) 50 MCG/ACT nasal spray 2 sprays       folic acid (FOLVITE) 1 MG tablet        gabapentin (NEURONTIN) 300 MG capsule Take 1 capsule (300 mg) by mouth nightly as needed 90 capsule 1     magic mouthwash (FIRST-MOUTHWASH BLM) compounding kit Take 30 mLs by mouth every 6 hours as needed for mouth sores 900 mL 2     Multiple Vitamins-Minerals  (MULTIVITAMIN ADULTS PO) Take 1 tablet by mouth daily       omeprazole (PRILOSEC) 20 MG DR capsule Take 1 capsule (20 mg) by mouth 2 times daily 180 capsule 1     ondansetron (ZOFRAN-ODT) 4 MG ODT tab Take 1 tablet (4 mg) by mouth every 6 hours as needed for nausea or vomiting 12 tablet 1     polyethylene glycol (MIRALAX/GLYCOLAX) powder Take 17 g (1 capful) by mouth daily 850 g 3     rivaroxaban ANTICOAGULANT (XARELTO ANTICOAGULANT) 20 MG TABS tablet Take 1 tablet (20 mg) by mouth daily (with dinner) 90 tablet 3     topiramate (TOPAMAX) 25 MG tablet Take 3 tablets (75 mg) by mouth daily 90 tablet 3     ursodiol (ACTIGALL) 300 MG capsule Take 1 capsule (300 mg) by mouth 2 times daily 60 capsule 4     varenicline (CHANTIX AMADO) 0.5 MG X 11 & 1 MG X 42 tablet Take 0.5 mg tab daily for 3 days, THEN 0.5 mg tab twice daily for 4 days, THEN 1 mg twice daily. 53 tablet 0     vitamin  s/Minerals TABS Take 1 tablet by mouth       vitamin B complex with vitamin C (VITAMIN  B COMPLEX) tablet Take 1 tablet by mouth daily 90 tablet 1     vitamin C (ASCORBIC ACID) 1000 MG TABS Take 1,000 mg by mouth       vitamin D3 (CHOLECALCIFEROL) 2000 units tablet Take 1 tablet by mouth daily 90 tablet 1     enoxaparin (LOVENOX) 120 MG/0.8ML syringe Inject 0.8 mLs (120 mg) Subcutaneous every 12 hours (Patient not taking: Reported on 10/10/2019) 12 Syringe 1        ALLERGIES:   No Known Allergies     FAMILY HISTORY:     Family History   Problem Relation Age of Onset     Diabetes Father      Hypertension Father      Breast Cancer Sister 26        surgery only     Cancer Sister      Coronary Artery Disease Other         paternal aunt     Thyroid Disease Other         neice     Coronary Artery Disease Other      Cerebrovascular Disease Other      Breast Cancer Sister      Thyroid Disease Other      Cerebrovascular Disease No family hx of         SOCIAL HISTORY:     Social History     Socioeconomic History     Marital status: Single     Spouse  "name: None     Number of children: None     Years of education: None     Highest education level: None   Occupational History     None   Social Needs     Financial resource strain: None     Food insecurity:     Worry: None     Inability: None     Transportation needs:     Medical: None     Non-medical: None   Tobacco Use     Smoking status: Former Smoker     Years: 1.00     Start date: 2016     Last attempt to quit: 2018     Years since quittin.4     Smokeless tobacco: Never Used   Substance and Sexual Activity     Alcohol use: Yes     Alcohol/week: 0.0 oz     Comment: occasional     Drug use: No     Sexual activity: Yes     Partners: Male   Lifestyle     Physical activity:     Days per week: None     Minutes per session: None     Stress: None   Relationships     Social connections:     Talks on phone: None     Gets together: None     Attends Sikh service: None     Active member of club or organization: None     Attends meetings of clubs or organizations: None     Relationship status: None     Intimate partner violence:     Fear of current or ex partner: None     Emotionally abused: None     Physically abused: None     Forced sexual activity: None   Other Topics Concern     Parent/sibling w/ CABG, MI or angioplasty before 65F 55M? Not Asked   Social History Narrative     None     Hilaria  reports previous alcohol use. and  reports that she quit smoking about 21 months ago. She started smoking about 2 years ago. She quit after 1.00 year of use. She has never used smokeless tobacco..     REVIEW OF SYSTEMS:   A comprehensive review of systems was performed and negative unless otherwise noted in the HPI above.      PHYSICAL EXAMINATION:   /88 (BP Location: Left arm, Patient Position: Chair, Cuff Size: Adult Regular)   Pulse 98   Ht 1.676 m (5' 6\")   Wt 112.7 kg (248 lb 8 oz)   SpO2 98%   BMI 40.11 kg/m    Body mass index is 40.11 kg/m .  Wt Readings from Last 2 Encounters:   10/18/19 " 112.7 kg (248 lb 8 oz)   10/10/19 112 kg (247 lb)     Constitutional: no acute distress, pleasant and cooperative, appears overall well.  Eyes:sclera white, conjunctiva clear, without icterus or pallor. Bruises noted underneath each eye   Ears/Nose/Mouth/Throat: Pinna, tragus, and external canal non-tender are normal, Nares patent b/l, moist mucous membranes. Echymosis with bump in the forehead in between eyes related to fall during collapse  Cardiovascular: RRR nl S1S2, JVP not elevated, mild edema R>L  Vasc: +2 radial, DP, PT pulses b/l   Respiratory: clear to auscultation and percussion bilaterally anterior and posterior  Gastrointestinal: soft, nontender, non distended, no hepatosplenomegaly or masses  Musculoskeletal: normal muscle bulk and tone, joints   Skin: normal skin appearance without worrisome lesions.   Neurologic: Alert and oriented, face symmetric, normal gait  Psychiatric: appropriate affect, eye contact, intact thought and speech       LABORATORY DATA:     LIPID RESULTS:  Recent Labs   Lab Test 01/12/18  0938 12/06/16  1121   CHOL 198 232*   HDL 71 70   LDL 90 142*   TRIG 183* 98        LIVER ENZYME RESULTS:  Recent Labs   Lab Test 05/28/19  1508 01/24/18  1122   AST 27 21   ALT 41 18       CBC RESULTS:  Recent Labs   Lab Test 06/04/19  1104 05/30/19  1550   WBC 9.2 8.5   HGB 9.6* 9.8*   HCT 30.8* 30.4*   * 593*       BMP RESULTS:  Recent Labs   Lab Test 06/04/19  1104 05/30/19  1550    144   POTASSIUM 3.5 3.0*   CHLORIDE 108 110*   CO2 26 25   ANIONGAP 8 10   GLC 95 100*   BUN 9 38*   CR 1.37* 3.14*   QUINCY 8.3* 7.6*       A1C RESULTS:  Lab Results   Component Value Date    A1C 5.3 09/25/2019       INR RESULTS:  Recent Labs   Lab Test 06/07/19 06/03/19   INR 1.2* 2.0*          PROCEDURES & FURTHER ASSESSMENTS:     EKG: Sinus rhythm at 74bpm, non specific TW changes    ECHO:   05/14/2019    SUMMARY:   1. Normal global left ventricular systolic function.   2. Left ventricular ejection  fraction, by visual estimation, is 70%.   3. Right ventricular size is severely enlarged. Right ventricular global systolic function is severely reduced.   4. Left ventricular diastolic function cannot be asessed due to E and A fusion caused by tachycardia.   5. Normal left ventricular chamber size.  In comparison to the previous echocardiogram(s):  There are no prior studies on this patient for comparison purposes.       CLINICAL IMPRESSION:     Hilaria Bonner is a 28 year old female with recent hx of massive PE with PEA arrest who is here for follow up of chronic PE management. Patient still has some residual dyspnea which is probably deconditioning as RV function normalized as did PA pressures, and no residual clot by CTPE to suggest CTEPH or RPVO.     In regards to possible underlining cause of PE, Ms. Bonner has multiple risk factors (smoking, obesity, hormonal birth control and family hx), given the extent of her embolism and  possible familial coagulopathy we would recommend life long anticoagulation at this time.     LE doppler negative for residual DVT, but will need follow up for PSA. Has palpitations lasting up to one hour, very strong. Would like to evaluate further.    PLAN:    -- Repeat CTPE was negative for residual clot to suggest RPVO or CTEPH, no need to repeat or obtain VQ scan at this time unless increased PA pressures in future  -- 6MWT done for baseline  -- Repeat US right lower extremity to evaluate for resolution of pseudoaneurysm   -- OK for rivaroxaban switch however with BMI there may be less therapeutic benefit (efficacy decreases in BMI > 40) will monitor closely   -- Ziopatch for palpitations  -- Follow up in 6 months at [facility]    Yelena Jones MD MSc  Division of Cardiology  [facility]

## 2019-11-08 ENCOUNTER — ANCILLARY PROCEDURE (OUTPATIENT)
Dept: ULTRASOUND IMAGING | Facility: CLINIC | Age: 29
End: 2019-11-08
Attending: INTERNAL MEDICINE
Payer: COMMERCIAL

## 2019-11-08 ENCOUNTER — OFFICE VISIT (OUTPATIENT)
Dept: ORTHOPEDICS | Facility: CLINIC | Age: 29
End: 2019-11-08
Payer: COMMERCIAL

## 2019-11-08 VITALS
OXYGEN SATURATION: 100 % | SYSTOLIC BLOOD PRESSURE: 138 MMHG | HEIGHT: 66 IN | DIASTOLIC BLOOD PRESSURE: 81 MMHG | WEIGHT: 254.6 LBS | BODY MASS INDEX: 40.92 KG/M2 | HEART RATE: 132 BPM

## 2019-11-08 DIAGNOSIS — I72.4 PSEUDOANEURYSM OF RIGHT FEMORAL ARTERY (H): ICD-10-CM

## 2019-11-08 DIAGNOSIS — M25.571 ACUTE RIGHT ANKLE PAIN: Primary | ICD-10-CM

## 2019-11-08 DIAGNOSIS — I82.4Y9 DEEP VEIN THROMBOSIS (DVT) OF PROXIMAL LOWER EXTREMITY, UNSPECIFIED CHRONICITY, UNSPECIFIED LATERALITY (H): ICD-10-CM

## 2019-11-08 PROCEDURE — 99214 OFFICE O/P EST MOD 30 MIN: CPT | Performed by: FAMILY MEDICINE

## 2019-11-08 PROCEDURE — 93970 EXTREMITY STUDY: CPT | Performed by: RADIOLOGY

## 2019-11-08 RX ORDER — METHYLPREDNISOLONE 4 MG
TABLET, DOSE PACK ORAL
Qty: 21 TABLET | Refills: 0 | Status: SHIPPED | OUTPATIENT
Start: 2019-11-08 | End: 2019-12-30

## 2019-11-08 ASSESSMENT — PAIN SCALES - GENERAL: PAINLEVEL: MODERATE PAIN (5)

## 2019-11-08 ASSESSMENT — MIFFLIN-ST. JEOR: SCORE: 1901.61

## 2019-11-08 NOTE — NURSING NOTE
[facility]  11/8/2019    Hilariaarturo Bonner's chief complaint for this visit includes:  Chief Complaint   Patient presents with     Right Ankle - Pain     PCP: Crissy Madrigal    Referring Provider:  No referring provider defined for this encounter.    There were no vitals taken for this visit.  Data Unavailable       Reason for visit:     What part of your body is injured / painful?  right ankle    What caused the injury /pain? No inciting event     How long ago did your injury occur or pain begin? about a month ago    What are your most bothersome symptoms? Pain    How would you characterize your symptom?  sharp and throbing    What makes your symptoms better? Wrap or brace    What makes your symptoms worse? Standing and Walking    Have you been previously seen for this problem? Yes, emergency dept and primary    Medical History:    Any recent changes to your medical history? No    Any new medication prescribed since last visit? No    Have you had surgery on this body part before? No    Social History:    Occupation: CNA    Handedness: Right    Exercise: WALKING    Review of Systems:    Do you have fever, chills, weight loss? No    Do you have any vision problems? No    Do you have any chest pain or edema? No    Do you have any shortness of breath or wheezing?  Yes, shortness of breath - working with cardiology    Do you have stomach problems? No    Do you have any numbness or focal weakness? Yes, intermittent numbness in bilateral toes    Do you have diabetes? No    Do you have problems with bleeding or clotting? Yes, Taking Xarelto    Do you have an rashes or other skin lesions? No    Xuan Hendrickson CMA

## 2019-11-08 NOTE — PATIENT INSTRUCTIONS
Thanks for coming today.  Ortho/Sports Medicine Clinic  [address] [place] [address]    To schedule future appointments in Ortho Clinic, you may call [phone].    To schedule ordered imaging by your provider:   Call Central Imaging [phone]    To schedule an injection ordered by your provider:  Call Central [facility] Injection scheduling line: [phone]  Achievers available online at:  [email or web address]    Please call if any further questions or concerns ([phone]).  Clinic hours 8 am to 5 pm.    Return to clinic (call) if symptoms worsen or fail to improve.    Preventive Care:    Diabetic Eye Exam Screening: During our visit today, we discussed that it is recommended you receive diabetic eye exam screening. Please call or make an appointment with your primary care provider to discuss this with them. You may also call the [facility] scheduling line ([phone]) to set up an eye exam at one of the [facility].      [facility]  POWERSTEP ORTHOTIC INSERTS  DR SCHOLLS ARCH PAIN    ANKLE EXERCISES  [email or web address]    PHYSICAL THERAPY

## 2019-11-08 NOTE — LETTER
11/8/2019         RE: Hilaria Bonner  [address]  [address]  [facility] [address]        Dear Colleague,    Thank you for referring your patient, Hilaria Bonner, to the [facility]. Please see a copy of my visit note below.    CHIEF COMPLAINT:  Pain of the Right Ankle     Referred by: Dr. Yelena Jones MD    HISTORY OF PRESENT ILLNESS  Ms. Bonner is a pleasant 28 year old year old female who presents to clinic today with right medial ankle pain.  Hilaria explains that pain of right ankle began 4 weeks ago. Insidious onset medial ankle.  Became more severe and seen in ED on 10/22/19.  XR negative at that point.  Given crutches and sent home.  Continued pain since this time.  Has been seen by PCP on 10/24 and US was ordered, brace given. Also notes more recent feeling of numbness and tingling of plantar aspect of foot.  Worse with ambulation and improved with rest.  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        What part of your body is injured / painful?  right ankle    What caused the injury /pain? No inciting event     How long ago did your injury occur or pain begin? about a month ago    What are your most bothersome symptoms? Pain    How would you characterize your symptom?  sharp and throbing    What makes your symptoms better? Wrap or brace    What makes your symptoms worse? Standing and Walking    Have you been previously seen for this problem? Yes, emergency dept and primary     Medical History:    Any recent changes to your medical history? No    Any new medication prescribed since last visit? No    Have you had surgery on this body part before? No    Patient Active Problem List   Diagnosis     Morbid obesity (H)     Vitamin D deficiency     Elevated parathyroid hormone     Encounter for smoking cessation counseling     Menorrhagia with irregular cycle     Morbid (severe) obesity due to excess calories (H)     Pulmonary embolism with infarction (H)       Social  History:    Occupation: CNA    Handedness: Right    Exercise: WALKING       Current Outpatient Medications   Medication Sig Dispense Refill     B Complex Vitamins (VITAMIN-B COMPLEX) TABS Take 1 tablet by mouth       Calcium Citrate-Vitamin D 500-500 MG-UNIT CHEW Take 1 chew tab by mouth 2 times daily 180 tablet 3     CHANTIX STARTING MONTH AMADO 0.5 MG X 11 & 1 MG X 42 tablet        childrens multivitamin w/iron (FLINTSTONES COMPLETE) 60 MG chewable tablet Take 1 chew tab by mouth daily       Cyanocobalamin (B-12) 500 MCG SUBL Place 1 tablet under the tongue daily 90 tablet 3     cyanocobalamin (VITAMIN B-12) 1000 MCG tablet Take 1,000 mcg by mouth       ferrous sulfate (FEROSUL) 325 (65 Fe) MG tablet Take 325 mg by mouth       fluticasone (FLONASE) 50 MCG/ACT nasal spray 2 sprays       folic acid (FOLVITE) 1 MG tablet        gabapentin (NEURONTIN) 300 MG capsule Take 1 capsule (300 mg) by mouth nightly as needed 90 capsule 1     magic mouthwash (FIRST-MOUTHWASH BLM) compounding kit Take 30 mLs by mouth every 6 hours as needed for mouth sores 900 mL 2     methylPREDNISolone (MEDROL DOSEPAK) 4 MG tablet therapy pack Follow Package Directions 21 tablet 0     Multiple Vitamins-Minerals (MULTIVITAMIN ADULTS PO) Take 1 tablet by mouth daily       omeprazole (PRILOSEC) 20 MG DR capsule Take 1 capsule (20 mg) by mouth 2 times daily 180 capsule 1     ondansetron (ZOFRAN-ODT) 4 MG ODT tab Take 1 tablet (4 mg) by mouth every 6 hours as needed for nausea or vomiting 12 tablet 1     order for DME by Device route continuous Crutches - use as instructed 1 Device 0     polyethylene glycol (MIRALAX/GLYCOLAX) powder Take 17 g (1 capful) by mouth daily 850 g 3     rivaroxaban ANTICOAGULANT (XARELTO ANTICOAGULANT) 20 MG TABS tablet Take 1 tablet (20 mg) by mouth daily (with dinner) 90 tablet 3     topiramate (TOPAMAX) 25 MG tablet Take 3 tablets (75 mg) by mouth daily 90 tablet 3     ursodiol (ACTIGALL) 300 MG capsule Take 1 capsule  "(300 mg) by mouth 2 times daily 60 capsule 4     varenicline (CHANTIX AMADO) 0.5 MG X 11 & 1 MG X 42 tablet Take 0.5 mg tab daily for 3 days, THEN 0.5 mg tab twice daily for 4 days, THEN 1 mg twice daily. 53 tablet 0     vitamin  s/Minerals TABS Take 1 tablet by mouth       vitamin B complex with vitamin C (VITAMIN  B COMPLEX) tablet Take 1 tablet by mouth daily 90 tablet 1     vitamin C (ASCORBIC ACID) 1000 MG TABS Take 1,000 mg by mouth       vitamin D3 (CHOLECALCIFEROL) 2000 units tablet Take 1 tablet by mouth daily 90 tablet 1       No Known Allergies    Family History   Problem Relation Age of Onset     Diabetes Father      Hypertension Father      Breast Cancer Sister 26        surgery only     Cancer Sister      Coronary Artery Disease Other         paternal aunt     Thyroid Disease Other         neice     Coronary Artery Disease Other      Cerebrovascular Disease Other      Breast Cancer Sister      Thyroid Disease Other      Cerebrovascular Disease No family hx of        Additional medical/Social/Surgical histories reviewed in [facility] and updated as appropriate.     REVIEW OF SYSTEMS (11/11/2019)    Do you have fever, chills, weight loss? No    Do you have any vision problems? No    Do you have any chest pain or edema? No    Do you have any shortness of breath or wheezing?  Yes, shortness of breath - working with cardiology    Do you have stomach problems? No    Do you have any numbness or focal weakness? Yes, intermittent numbness in bilateral toes    Do you have diabetes? No    Do you have problems with bleeding or clotting? Yes, Taking Xarelto    Do you have an rashes or other skin lesions? No     PHYSICAL EXAM  /81 (BP Location: Left arm, Patient Position: Sitting, Cuff Size: Adult Regular)   Pulse 132   Ht 1.676 m (5' 6\")   Wt 115.5 kg (254 lb 9.6 oz)   SpO2 100%   BMI 41.09 kg/m       General  - normal appearance, in no obvious distress  CV  - normal pulses at posterior tib and dorsalis " pedis  Pulm  - normal respiratory pattern, non-labored  Musculoskeletal -Right ankle  - stance: Antalgic gait with limp.  - inspection: Mild swelling medial malleolar region and medial foot-ankle junction,  Pes planus, hindfoot valgus.   - palpation: no soft tissue tenderness, malleoli and tarsal bones non-tender  - ROM: normal dorsiflexion, plantar flexion, inversion painful  - strength: Grossly intact, painful resisted inversion.  - special tests:  (-) anterior drawer  (-) talar tilt  (+) Tinel's medial ankle, +tarsal tunnel compression test with ultrasound probe  (-) Rick test   Neuro  - no sensory or motor deficit, grossly normal coordination, normal muscle tone  Skin  - no ecchymosis, erythema, warmth, or induration, no obvious rash  Psych  - interactive, appropriate, normal mood and affect    IMAGING :   XR ANKLE RT G/E 3 VW   10/24/2019 11:32 AM      HISTORY:  Acute right ankle pain           IMPRESSION:  Negative exam.     YASH CHO MD    [facility] IN-OFFICE: Sonopalpation with tenderness confirmed in region of tarsal tunnel, posterior tibial tendon.       ASSESSMENT & PLAN  Ms. Bonner is a 28 year old year old female who presents to clinic today with persistent medial ankle pain, tingling of plantar aspect of foot over the past 4 weeks.  No acute trauma or injury. Suspect tarsal tunnel syndrome of right ankle.  Contribution of hindfoot valgus/pes planus, obesity and prolonged walking during the day. Also possibly posterior tibial tendonitis with swelling.    Diagnosis: Acute pain of right ankle    -Complete U/S DVT; h/o PE  -Correction of hindfoot valgus with ankle brace  -Arch supports recommended to purchase ASAP  -Medrol pack; intolerance to nsaids d/t xarelto  -Follow up 2 weeks; consider mri if persiting    It was a pleasure seeing Hilaria today.    Yash Melgar DO, CAM  Primary Care Sports Medicine      Again, thank you for allowing me to participate in the care of your patient.         Sincerely,        Yash Melgar, DO

## 2019-11-09 LAB — RADIOLOGIST FLAGS: ABNORMAL

## 2019-11-11 ENCOUNTER — TELEPHONE (OUTPATIENT)
Dept: CARDIOLOGY | Facility: CLINIC | Age: 29
End: 2019-11-11

## 2019-11-11 ENCOUNTER — TELEPHONE (OUTPATIENT)
Dept: ORTHOPEDICS | Facility: CLINIC | Age: 29
End: 2019-11-11

## 2019-11-11 ENCOUNTER — TELEPHONE (OUTPATIENT)
Dept: FAMILY MEDICINE | Facility: CLINIC | Age: 29
End: 2019-11-11

## 2019-11-11 NOTE — TELEPHONE ENCOUNTER
[place] forms placed on Dr. Greenfield desk for completion.    Carolann ESTRELLA, Patient Care

## 2019-11-11 NOTE — TELEPHONE ENCOUNTER
Called Dora back, reach [facility], who was covering for her during lunch. We looked at US report together and saw that Dr. Jones had sent a message to the patient with follow-up steps earlier this morning.

## 2019-11-11 NOTE — TELEPHONE ENCOUNTER
11/11 Instructed patient to call [phone] to schedule a two week follow up around 11/22/19.    Radha Zavaleta   Procedure    Ortho/Sports Med/Ent/Eye   [facility]   [phone]

## 2019-11-11 NOTE — TELEPHONE ENCOUNTER
[facility] Call Center    Phone Message    May a detailed message be left on voicemail: yes    Reason for Call: Other: Dora calling to request a call back. She states theres an urgent finding on pts US. Please call her back to discuss.      Action Taken: Message routed to:  [facility] ([facility] brianne cardio

## 2019-11-12 NOTE — PROGRESS NOTES
CHIEF COMPLAINT:  Pain of the Right Ankle     Referred by: Dr. Yelena Jones MD    HISTORY OF PRESENT ILLNESS  Ms. Bonner is a pleasant 28 year old year old female who presents to clinic today with right medial ankle pain.  Hilaria explains that pain of right ankle began 4 weeks ago. Insidious onset medial ankle.  Became more severe and seen in ED on 10/22/19.  XR negative at that point.  Given crutches and sent home.  Continued pain since this time.  Has been seen by PCP on 10/24 and US was ordered, brace given. Also notes more recent feeling of numbness and tingling of plantar aspect of foot.  Worse with ambulation and improved with rest.  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        What part of your body is injured / painful?  right ankle    What caused the injury /pain? No inciting event     How long ago did your injury occur or pain begin? about a month ago    What are your most bothersome symptoms? Pain    How would you characterize your symptom?  sharp and throbing    What makes your symptoms better? Wrap or brace    What makes your symptoms worse? Standing and Walking    Have you been previously seen for this problem? Yes, emergency dept and primary     Medical History:    Any recent changes to your medical history? No    Any new medication prescribed since last visit? No    Have you had surgery on this body part before? No    Patient Active Problem List   Diagnosis     Morbid obesity (H)     Vitamin D deficiency     Elevated parathyroid hormone     Encounter for smoking cessation counseling     Menorrhagia with irregular cycle     Morbid (severe) obesity due to excess calories (H)     Pulmonary embolism with infarction (H)       Social History:    Occupation: CNA    Handedness: Right    Exercise: WALKING       Current Outpatient Medications   Medication Sig Dispense Refill     B Complex Vitamins (VITAMIN-B COMPLEX) TABS Take 1 tablet by mouth       Calcium Citrate-Vitamin D 500-500 MG-UNIT CHEW  Take 1 chew tab by mouth 2 times daily 180 tablet 3     CHANTIX STARTING MONTH AMADO 0.5 MG X 11 & 1 MG X 42 tablet        childrens multivitamin w/iron (FLINTSTONES COMPLETE) 60 MG chewable tablet Take 1 chew tab by mouth daily       Cyanocobalamin (B-12) 500 MCG SUBL Place 1 tablet under the tongue daily 90 tablet 3     cyanocobalamin (VITAMIN B-12) 1000 MCG tablet Take 1,000 mcg by mouth       ferrous sulfate (FEROSUL) 325 (65 Fe) MG tablet Take 325 mg by mouth       fluticasone (FLONASE) 50 MCG/ACT nasal spray 2 sprays       folic acid (FOLVITE) 1 MG tablet        gabapentin (NEURONTIN) 300 MG capsule Take 1 capsule (300 mg) by mouth nightly as needed 90 capsule 1     magic mouthwash (FIRST-MOUTHWASH BLM) compounding kit Take 30 mLs by mouth every 6 hours as needed for mouth sores 900 mL 2     methylPREDNISolone (MEDROL DOSEPAK) 4 MG tablet therapy pack Follow Package Directions 21 tablet 0     Multiple Vitamins-Minerals (MULTIVITAMIN ADULTS PO) Take 1 tablet by mouth daily       omeprazole (PRILOSEC) 20 MG DR capsule Take 1 capsule (20 mg) by mouth 2 times daily 180 capsule 1     ondansetron (ZOFRAN-ODT) 4 MG ODT tab Take 1 tablet (4 mg) by mouth every 6 hours as needed for nausea or vomiting 12 tablet 1     order for DME by Device route continuous Crutches - use as instructed 1 Device 0     polyethylene glycol (MIRALAX/GLYCOLAX) powder Take 17 g (1 capful) by mouth daily 850 g 3     rivaroxaban ANTICOAGULANT (XARELTO ANTICOAGULANT) 20 MG TABS tablet Take 1 tablet (20 mg) by mouth daily (with dinner) 90 tablet 3     topiramate (TOPAMAX) 25 MG tablet Take 3 tablets (75 mg) by mouth daily 90 tablet 3     ursodiol (ACTIGALL) 300 MG capsule Take 1 capsule (300 mg) by mouth 2 times daily 60 capsule 4     varenicline (CHANTIX AMADO) 0.5 MG X 11 & 1 MG X 42 tablet Take 0.5 mg tab daily for 3 days, THEN 0.5 mg tab twice daily for 4 days, THEN 1 mg twice daily. 53 tablet 0     vitamin  s/Minerals TABS Take 1 tablet by mouth  "      vitamin B complex with vitamin C (VITAMIN  B COMPLEX) tablet Take 1 tablet by mouth daily 90 tablet 1     vitamin C (ASCORBIC ACID) 1000 MG TABS Take 1,000 mg by mouth       vitamin D3 (CHOLECALCIFEROL) 2000 units tablet Take 1 tablet by mouth daily 90 tablet 1       No Known Allergies    Family History   Problem Relation Age of Onset     Diabetes Father      Hypertension Father      Breast Cancer Sister 26        surgery only     Cancer Sister      Coronary Artery Disease Other         paternal aunt     Thyroid Disease Other         neice     Coronary Artery Disease Other      Cerebrovascular Disease Other      Breast Cancer Sister      Thyroid Disease Other      Cerebrovascular Disease No family hx of        Additional medical/Social/Surgical histories reviewed in [facility] and updated as appropriate.     REVIEW OF SYSTEMS (11/11/2019)    Do you have fever, chills, weight loss? No    Do you have any vision problems? No    Do you have any chest pain or edema? No    Do you have any shortness of breath or wheezing?  Yes, shortness of breath - working with cardiology    Do you have stomach problems? No    Do you have any numbness or focal weakness? Yes, intermittent numbness in bilateral toes    Do you have diabetes? No    Do you have problems with bleeding or clotting? Yes, Taking Xarelto    Do you have an rashes or other skin lesions? No     PHYSICAL EXAM  /81 (BP Location: Left arm, Patient Position: Sitting, Cuff Size: Adult Regular)   Pulse 132   Ht 1.676 m (5' 6\")   Wt 115.5 kg (254 lb 9.6 oz)   SpO2 100%   BMI 41.09 kg/m      General  - normal appearance, in no obvious distress  CV  - normal pulses at posterior tib and dorsalis pedis  Pulm  - normal respiratory pattern, non-labored  Musculoskeletal -Right ankle  - stance: Antalgic gait with limp.  - inspection: Mild swelling medial malleolar region and medial foot-ankle junction,  Pes planus, hindfoot valgus.   - palpation: no soft tissue " tenderness, malleoli and tarsal bones non-tender  - ROM: normal dorsiflexion, plantar flexion, inversion painful  - strength: Grossly intact, painful resisted inversion.  - special tests:  (-) anterior drawer  (-) talar tilt  (+) Tinel's medial ankle, +tarsal tunnel compression test with ultrasound probe  (-) Rick test   Neuro  - no sensory or motor deficit, grossly normal coordination, normal muscle tone  Skin  - no ecchymosis, erythema, warmth, or induration, no obvious rash  Psych  - interactive, appropriate, normal mood and affect    IMAGING :   XR ANKLE RT G/E 3 VW   10/24/2019 11:32 AM      HISTORY:  Acute right ankle pain           IMPRESSION:  Negative exam.     YASH CHO MD    [facility] IN-OFFICE: Sonopalpation with tenderness confirmed in region of tarsal tunnel, posterior tibial tendon.       ASSESSMENT & PLAN  Ms. Bonner is a 28 year old year old female who presents to clinic today with persistent medial ankle pain, tingling of plantar aspect of foot over the past 4 weeks.  No acute trauma or injury. Suspect tarsal tunnel syndrome of right ankle.  Contribution of hindfoot valgus/pes planus, obesity and prolonged walking during the day. Also possibly posterior tibial tendonitis with swelling.    Diagnosis: Acute pain of right ankle    -Complete U/S DVT; h/o PE  -Correction of hindfoot valgus with ankle brace  -Arch supports recommended to purchase ASAP  -Medrol pack; intolerance to nsaids d/t xarelto  -Follow up 2 weeks; consider mri if persiting    It was a pleasure seeing Hilaria today.    Yash Melgar DO, CAQSM  Primary Care Sports Medicine

## 2019-11-13 ENCOUNTER — TELEPHONE (OUTPATIENT)
Dept: ORTHOPEDICS | Facility: CLINIC | Age: 29
End: 2019-11-13

## 2019-11-13 NOTE — TELEPHONE ENCOUNTER
11/13 2nd attempt, Instructed patient to call [phone] to schedule a follow up on 11/22/19.    Radha Zavaleta   Procedure    Ortho/Sports Med/Ent/Eye   [facility]   [phone]

## 2019-11-15 ENCOUNTER — TELEPHONE (OUTPATIENT)
Dept: ORTHOPEDICS | Facility: CLINIC | Age: 29
End: 2019-11-15

## 2019-11-15 NOTE — TELEPHONE ENCOUNTER
11/15 Instructed patient to call [phone] to schedule a follow up around 11/22/19.    Radha Zavaleta   Procedure    Ortho/Sports Med/Ent/Eye   [facility]   [phone]

## 2019-11-15 NOTE — NURSING NOTE
Bijal comes into the office for follow-up visit regarding her Parkinson's disease as well as dystonia in the left foot.  She also has the dystonia/trigger fingers in the left hand.  She also has fatigue.    I have to make a correction regarding the previous note I had.  It was mentioned by ever that the patient is on ropinirole 300 mg.  The dose is 3 mg 3 times a day.    The patient states that her symptoms have been stable.  She has been on ropinirole 3 mg p.o. t.i.d..  Denies any side effects such as grogginess or drowsiness.  Denies any impulse control disorder.  Denies any GI symptoms.  She has no difficulty with ambulation.  The left lower extremity is much better.  Still has a bit of dystonic posturing but she can manage with that.  Still having with dystonic posturing of the left hand.  The fingers on the left hand still locked but not as bad as before.  Overall she is doing better.    The fatigue has improved since we discontinued her baclofen.    Taking her vitamin-D on a regular basis.    She has been off of selegiline.    Past Medical History:   Diagnosis Date   • HTN (hypertension), benign    • Parkinson disease (CMS/[facility]) [id number]       ALLERGIES:  No Known Allergies    Current Outpatient Medications   Medication Sig Dispense Refill   • ropinirole (REQUIP) 3 MG tablet Take 1 tablet by mouth 3 times daily. 270 tablet 1   • propranolol (INDERAL LA) 80 MG 24 hr capsule Take 1 capsule by mouth daily. 90 capsule 4   • cannabidiol (CBD) Take 10 drops by mouth 2 times daily.     • cholecalciferol (VITAMIN D3) 1000 UNITS tablet Take 1,000 Units by mouth daily.     • selegiline (ELDEPRYL) 5 MG tablet Take 1 tablet by mouth in the morning and 1 tablet by mouth at noon. 60 tablet 5   • baclofen (LIORESAL) 10 MG tablet Take 1 tablet by mouth 3 times daily. 90 tablet 5     No current facility-administered medications for this visit.            Review of Systems:     As mentioned above.    General Exam:    Visit  Application of Fluoride Varnish    Dental Fluoride Varnish and Post-Treatment Instructions: Reviewed with mother   used: No    Dental Fluoride applied to teeth by: Gabby Soares MA  Fluoride was well tolerated    LOT #: [id number]  EXPIRATION DATE:  08/2020      Gabby Soares MA     Vitals  /70 (BP Location: LUE - Left upper extremity, Patient Position: Sitting, Cuff Size: Large Adult)   Pulse 72   Ht 5' 8\" (1.727 m)   Wt 93.3 kg   BMI 31.28 kg/m²         Neurology Exam:    Attention span and concentration: Normal.  The patient was awake, alert and oriented to person time and place.  Speech and language functions: Normal. Can repeat well. Can understand well.  Cranial nerves: Visual fields are full. Pupils are round equal and reactive to light. Extraocular movements were full. Face was symmetric for muscle and sensation. Tongue in the midline.  Motor examination:  Mild cogwheel rigidity in left upper extremity.  She still has the snapping of the fingers with slight dystonic posturing of the left hand.  Similar to before.  A fast but not parkinsonian tremor upon posture in the left arm and it is stressed out it could be related to have dystonic posture.  Left leg still has some  dystonic posturing not as significant as was before.  However strength was 5/5 in both upper and both lower extremities  Sensory examination: normal to pinprick, vibration, temperature   Deep tendon reflexes in the upper extremities:  Brisk in the biceps and triceps  and brachioradialis bilaterally.  Deep tendon reflexes:  Brisk knee and less so in the ankle jerks bilaterally.  Plantar reflexes: Flexor bilaterally  Coordination: Normal finger-nose-finger bilaterally although it is slow on the left.  No ataxia.  Normal lower extremity on the right side but is not ataxic.  Gait:  She can stand up from a chair without any difficulty.  The left leg is stiff and the dystonic when she ambulates.  Otherwise she is not ataxic.  Walks very well.  No retropulsion.  No postural instability.        Impression:    1. Parkinson's disease.  2. Dystonia in the left arm left leg.  3. Fatigue resolved after discontinuation of baclofen.    Doing well.    Plan:    1. Ropinirole 3 mg p.o. t.i.d..  2. Continue vitamin-D  supplementation.  3. Continue brace in the left hand.  4. Continue exercising.  5. I will see her in 6 months for a follow-up visit LS any problems arise he will call us earlier.

## 2019-12-13 ENCOUNTER — MEDICAL CORRESPONDENCE (OUTPATIENT)
Dept: HEALTH INFORMATION MANAGEMENT | Facility: CLINIC | Age: 29
End: 2019-12-13

## 2019-12-13 ENCOUNTER — TELEPHONE (OUTPATIENT)
Dept: FAMILY MEDICINE | Facility: CLINIC | Age: 29
End: 2019-12-13

## 2019-12-17 ENCOUNTER — TELEPHONE (OUTPATIENT)
Dept: FAMILY MEDICINE | Facility: CLINIC | Age: 29
End: 2019-12-17

## 2019-12-17 NOTE — TELEPHONE ENCOUNTER
Reason for call:  Other   Patient called regarding (reason for call): call back  Additional comments: patient is having a tooth removed, they told her to call and see what she should do as far as holding her warfarin  And when to start back    Phone number to reach patient:  [phone]    Best Time:  any    Can we leave a detailed message on this number?  YES

## 2019-12-17 NOTE — TELEPHONE ENCOUNTER
In general we do not like to stop blood thinners for minor dental procedures, so I would recommend not stopping the Xarelto

## 2019-12-17 NOTE — TELEPHONE ENCOUNTER
Called and discussed that Dr. Valencia is not recommending for patient to stop taking the Xarelto for tooth extraction. Offered to fax a letter to her dentist saying that and patient states that she does not know where is going to go yet.     Patient will return the call to the clinic or have the dentist request the letter when it is time for the procedure.     Joceline Gallo RN

## 2019-12-17 NOTE — TELEPHONE ENCOUNTER
Routing to provider to advise; patient is not longer on warfarin but is taking Xarelto. Does patient need to hold Xarelto before tooth extraction? Please advise.    Ayana Tay RN, BSN, PHN

## 2019-12-24 ENCOUNTER — TRANSFERRED RECORDS (OUTPATIENT)
Dept: HEALTH INFORMATION MANAGEMENT | Facility: CLINIC | Age: 29
End: 2019-12-24

## 2019-12-24 LAB
ALT SERPL-CCNC: 27 IU/L (ref 12–68)
AST SERPL-CCNC: 20 IU/L (ref 12–37)
CREAT SERPL-MCNC: 0.7 MG/DL (ref 0.55–1.02)
GFR SERPL CREATININE-BSD FRML MDRD: >60 ML/MIN
GLUCOSE SERPL-MCNC: 122 MG/DL (ref 74–106)
POTASSIUM SERPL-SCNC: 4.5 MMOL/L (ref 3.5–5.1)
TRIGL SERPL-MCNC: 108 MG/DL

## 2019-12-25 LAB
CREAT SERPL-MCNC: 0.62 MG/DL (ref 0.55–1.02)
GFR SERPL CREATININE-BSD FRML MDRD: >60 ML/MIN
GLUCOSE SERPL-MCNC: 99 MG/DL (ref 74–106)
POTASSIUM SERPL-SCNC: 3.8 MMOL/L (ref 3.5–5.1)

## 2019-12-30 ENCOUNTER — TELEPHONE (OUTPATIENT)
Dept: FAMILY MEDICINE | Facility: CLINIC | Age: 29
End: 2019-12-30

## 2019-12-30 ENCOUNTER — OFFICE VISIT (OUTPATIENT)
Dept: FAMILY MEDICINE | Facility: CLINIC | Age: 29
End: 2019-12-30
Payer: COMMERCIAL

## 2019-12-30 ENCOUNTER — ANCILLARY PROCEDURE (OUTPATIENT)
Dept: GENERAL RADIOLOGY | Facility: CLINIC | Age: 29
End: 2019-12-30
Attending: NURSE PRACTITIONER
Payer: COMMERCIAL

## 2019-12-30 VITALS
RESPIRATION RATE: 18 BRPM | OXYGEN SATURATION: 100 % | SYSTOLIC BLOOD PRESSURE: 122 MMHG | BODY MASS INDEX: 40.82 KG/M2 | WEIGHT: 254 LBS | HEART RATE: 96 BPM | DIASTOLIC BLOOD PRESSURE: 80 MMHG | TEMPERATURE: 98 F | HEIGHT: 66 IN

## 2019-12-30 DIAGNOSIS — M54.6 ACUTE BILATERAL THORACIC BACK PAIN: Primary | ICD-10-CM

## 2019-12-30 DIAGNOSIS — Z30.42 ENCOUNTER FOR SURVEILLANCE OF INJECTABLE CONTRACEPTIVE: ICD-10-CM

## 2019-12-30 DIAGNOSIS — Z87.19 HISTORY OF PANCREATITIS: Primary | ICD-10-CM

## 2019-12-30 DIAGNOSIS — E53.8 VITAMIN B12 DEFICIENCY (NON ANEMIC): ICD-10-CM

## 2019-12-30 DIAGNOSIS — M54.2 NECK PAIN: ICD-10-CM

## 2019-12-30 DIAGNOSIS — D64.9 ANEMIA, UNSPECIFIED TYPE: ICD-10-CM

## 2019-12-30 DIAGNOSIS — M54.6 ACUTE BILATERAL THORACIC BACK PAIN: ICD-10-CM

## 2019-12-30 LAB
ALBUMIN SERPL-MCNC: 3.2 G/DL (ref 3.4–5)
ALP SERPL-CCNC: 108 U/L (ref 40–150)
ALT SERPL W P-5'-P-CCNC: 16 U/L (ref 0–50)
ANION GAP SERPL CALCULATED.3IONS-SCNC: 4 MMOL/L (ref 3–14)
AST SERPL W P-5'-P-CCNC: 14 U/L (ref 0–45)
BILIRUB SERPL-MCNC: 0.4 MG/DL (ref 0.2–1.3)
BUN SERPL-MCNC: 4 MG/DL (ref 7–30)
CALCIUM SERPL-MCNC: 9 MG/DL (ref 8.5–10.1)
CHLORIDE SERPL-SCNC: 109 MMOL/L (ref 94–109)
CO2 SERPL-SCNC: 32 MMOL/L (ref 20–32)
CREAT SERPL-MCNC: 0.61 MG/DL (ref 0.52–1.04)
ERYTHROCYTE [DISTWIDTH] IN BLOOD BY AUTOMATED COUNT: 20.4 % (ref 10–15)
GFR SERPL CREATININE-BSD FRML MDRD: >90 ML/MIN/{1.73_M2}
GLUCOSE SERPL-MCNC: 91 MG/DL (ref 70–99)
HCG UR QL: NEGATIVE
HCT VFR BLD AUTO: 30.7 % (ref 35–47)
HGB BLD-MCNC: 9.8 G/DL (ref 11.7–15.7)
LIPASE SERPL-CCNC: 111 U/L (ref 73–393)
MCH RBC QN AUTO: 37 PG (ref 26.5–33)
MCHC RBC AUTO-ENTMCNC: 31.9 G/DL (ref 31.5–36.5)
MCV RBC AUTO: 116 FL (ref 78–100)
PLATELET # BLD AUTO: 474 10E9/L (ref 150–450)
POTASSIUM SERPL-SCNC: 4.1 MMOL/L (ref 3.4–5.3)
PROT SERPL-MCNC: 6.6 G/DL (ref 6.8–8.8)
RBC # BLD AUTO: 2.65 10E12/L (ref 3.8–5.2)
SODIUM SERPL-SCNC: 145 MMOL/L (ref 133–144)
VIT B12 SERPL-MCNC: 158 PG/ML (ref 193–986)
WBC # BLD AUTO: 7.3 10E9/L (ref 4–11)

## 2019-12-30 PROCEDURE — 99214 OFFICE O/P EST MOD 30 MIN: CPT | Mod: 25 | Performed by: NURSE PRACTITIONER

## 2019-12-30 PROCEDURE — 81025 URINE PREGNANCY TEST: CPT | Performed by: NURSE PRACTITIONER

## 2019-12-30 PROCEDURE — 36415 COLL VENOUS BLD VENIPUNCTURE: CPT | Performed by: NURSE PRACTITIONER

## 2019-12-30 PROCEDURE — 83690 ASSAY OF LIPASE: CPT | Performed by: NURSE PRACTITIONER

## 2019-12-30 PROCEDURE — 72070 X-RAY EXAM THORAC SPINE 2VWS: CPT | Mod: FY

## 2019-12-30 PROCEDURE — 82607 VITAMIN B-12: CPT | Performed by: NURSE PRACTITIONER

## 2019-12-30 PROCEDURE — 85027 COMPLETE CBC AUTOMATED: CPT | Performed by: NURSE PRACTITIONER

## 2019-12-30 PROCEDURE — 80053 COMPREHEN METABOLIC PANEL: CPT | Performed by: NURSE PRACTITIONER

## 2019-12-30 PROCEDURE — 72040 X-RAY EXAM NECK SPINE 2-3 VW: CPT | Mod: FY

## 2019-12-30 PROCEDURE — 96372 THER/PROPH/DIAG INJ SC/IM: CPT | Performed by: NURSE PRACTITIONER

## 2019-12-30 RX ORDER — LIDOCAINE 50 MG/G
PATCH TOPICAL
Qty: 30 PATCH | Refills: 0 | Status: SHIPPED | OUTPATIENT
Start: 2019-12-30 | End: 2020-01-07

## 2019-12-30 RX ORDER — MEDROXYPROGESTERONE ACETATE 150 MG/ML
150 INJECTION, SUSPENSION INTRAMUSCULAR
Status: DISCONTINUED | OUTPATIENT
Start: 2019-12-30 | End: 2020-09-30

## 2019-12-30 RX ORDER — OXYCODONE HYDROCHLORIDE 5 MG/1
TABLET ORAL
COMMUNITY
Start: 2019-12-26 | End: 2020-01-31

## 2019-12-30 RX ADMIN — MEDROXYPROGESTERONE ACETATE 150 MG: 150 INJECTION, SUSPENSION INTRAMUSCULAR at 11:51

## 2019-12-30 ASSESSMENT — MIFFLIN-ST. JEOR: SCORE: 1893.89

## 2019-12-30 NOTE — PATIENT INSTRUCTIONS
Needs EUS outpatient with GI.  GI: [facility], [facility]: [phone]      At [facility], we strive to deliver an exceptional experience to you, every time we see you. If you receive a survey, please complete it as we do value your feedback.  If you have MyChart, you can expect to receive results automatically within 24 hours of their completion.  Your provider will send a note interpreting your results as well.   If you do not have MyChart, you should receive your results in about a week by mail.    Your care team:     Family Medicine   KATTY Dickinson MD Emily Bunt, [facility]   S. MD Charley Del Cid MD Angela Wermerskirchen, MD    Internal Medicine  Yang Clark MD coming 2020     Clinic hours: Monday - Wednesday 7 am-7 pm   Thursdays and Fridays 7 am-5 pm.     [facility]: Monday - Friday 11 am-9 pm,   Saturday and Sunday 9 am-5 pm.    [facility]: Monday -Thursday 8 am-8 pm; Friday 8 am-6 pm; Saturday and Sunday 9 am-5 pm.     [facility]: Monday - Thursday 8 am - 7 pm; Friday 8 am - 6 pm    Clinic: [phone]   [facility]: [phone]   [facility]: [phone]

## 2019-12-30 NOTE — TELEPHONE ENCOUNTER
Message from [facility] [phone]    lidocaine (LIDODERM) 5 % patch not on patient plan.  Preferred alternatives DULOXETINEHCL, GABAPENTIN, SAVELLA.  Please call/fax the pharmacy to change medication.    Clare Butler

## 2019-12-30 NOTE — PROGRESS NOTES
Subjective     Hilaira Bonner is a 29 year old female who presents to clinic today for the following health issues:    HPI       Hospital Follow-up Visit:    Hospital/Nursing Home/IP Rehab Facility: [facility]   Date of Admission: 12/24/19  Date of Discharge: 12/26/19  Reason(s) for Admission: Pancreatitis             Problems taking medications regularly:  None       Medication changes since discharge:     NEW MEDICATIONS   oxyCODONE, immediate release, (ROXICODONE) 5 mg oral tablet Take 1 tablet (5 mg) by mouth every 4 (four) hours as needed.  Qty: 10 tablet, Refills: 0          Problems adhering to non-medication therapy:  None    Summary of hospitalization:  [facility] discharge summary reviewed  Diagnostic Tests/Treatments reviewed.  Follow up needed: none  Other Healthcare Providers Involved in Patient s Care:         None  Update since discharge: stable.     Post Discharge Medication Reconciliation: discharge medications reconciled, continue medications without change.  Plan of care communicated with patient     Coding guidelines for this visit:  Type of Medical   Decision Making Face-to-Face Visit       within 7 Days of discharge Face-to-Face Visit        within 14 days of discharge   Moderate Complexity [id number]   High Complexity [id number]          To ER with abdominal pain. +Pancratitis. Lipase up to 878. Slight free fluid in abdomen/pelvis. Hx of gastric bypass. Also had episode 8/19/19. Did not see GI. No gall stones.  Was drinking this episode all night for her birthday. Needs EUS outpatient with GI. Otherwise denies drinking much since May 2019.  Had back back for about 4 days prior to her abdominal pain. Woke up in the middle of the night, felt some lower cramping. Then went back to sleep, then had severe pain. Vomited a few times, clear fluid. Drank a bunch of water. Then went to ER around 8 am.     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 On xarelto. Had 2 transfusions  "when she had the PEA in may 2019,     She had back pain with her first son. She had epidural for one of them, she had a spinal done and no blood in her spine. Pain starts mid-back and if she moves a certain way the pain goes up and down her back.  Feels like \"a knife in my back right now\".  Also has neck pain. Sometimes gets numb in her hands but states that has occurred since her PEA in May. Denies radiation mid-b.  Ack pain into her legs. She is very tender to light palpation    Had/has bilateral ankle pain. Saw sports med: got shoe inserts and ankle braces which is helping.   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  It appears negative but also there were telephone encounters that note provider spoke to patient but cannot find that actual note.  Will send a message to cardiology to see if they want this repeated or not.    Needs to go back to work tomorrow, needs a letter for that. She is a nursing assistant.  She states that she needs to pay bills, but she still having some abdominal pain.    Anemia: takes b-12 vitamins.  Could be anemic due to get gastric bypass    Last depo: 9/25/19: due 12/11-12/25/19.  Denies being pregnant we will do a urine pregnancy test today    Patient Active Problem List   Diagnosis     Morbid obesity (H)     Vitamin D deficiency     Elevated parathyroid hormone     Encounter for smoking cessation counseling     Menorrhagia with irregular cycle     Morbid (severe) obesity due to excess calories (H)     Pulmonary embolism with infarction (H)     Past Surgical History:   Procedure Laterality Date     GYN SURGERY      2 C-sections     KNEE SURGERY  most recently - [id number]    3 surgeries in [place]     LAPAROSCOPIC GASTRIC SLEEVE N/A 3/26/2019    Procedure: Laparoscopic Sleeve Gastrectomy;  Surgeon: Luan Lopez MD;  Location: [facility] OR     ORTHOPEDIC SURGERY      2 knee meniscus surgery       Social History     Tobacco Use     Smoking status: Former " Smoker     Years: 1.00     Start date: 2016     Last attempt to quit: 2018     Years since quittin.9     Smokeless tobacco: Never Used   Substance Use Topics     Alcohol use: Not Currently     Alcohol/week: 0.0 standard drinks     Comment: occasional     Family History   Problem Relation Age of Onset     Diabetes Father      Hypertension Father      Breast Cancer Sister 26        surgery only     Cancer Sister      Coronary Artery Disease Other         paternal aunt     Thyroid Disease Other         neice     Coronary Artery Disease Other      Cerebrovascular Disease Other      Breast Cancer Sister      Thyroid Disease Other      Cerebrovascular Disease No family hx of          Current Outpatient Medications   Medication Sig Dispense Refill     acetaminophen-codeine (TYLENOL #3) 300-30 MG tablet Take 1 tablet by mouth 3 times daily as needed for severe pain 9 tablet 0     B Complex Vitamins (VITAMIN-B COMPLEX) TABS Take 1 tablet by mouth       Calcium Citrate-Vitamin D 500-500 MG-UNIT CHEW Take 1 chew tab by mouth 2 times daily 180 tablet 3     CHANTIX STARTING MONTH AMADO 0.5 MG X 11 & 1 MG X 42 tablet        childrens multivitamin w/iron (FLINTSTONES COMPLETE) 60 MG chewable tablet Take 1 chew tab by mouth daily       Cyanocobalamin (B-12) 500 MCG SUBL Place 1 tablet under the tongue daily 90 tablet 3     cyanocobalamin (VITAMIN B-12) 1000 MCG tablet Take 1,000 mcg by mouth       ferrous sulfate (FEROSUL) 325 (65 Fe) MG tablet Take 325 mg by mouth       fluticasone (FLONASE) 50 MCG/ACT nasal spray 2 sprays       folic acid (FOLVITE) 1 MG tablet        gabapentin (NEURONTIN) 300 MG capsule Take 1 capsule (300 mg) by mouth nightly as needed 90 capsule 1     lidocaine (LIDODERM) 5 % patch Place on back daily for 12 hours then remove. To prevent lidocaine toxicity, patient should be patch free for 12 hrs daily. 30 patch 0     magic mouthwash (FIRST-MOUTHWASH BLM) compounding kit Take 30 mLs by mouth every  "6 hours as needed for mouth sores 900 mL 2     Multiple Vitamins-Minerals (MULTIVITAMIN ADULTS PO) Take 1 tablet by mouth daily       omeprazole (PRILOSEC) 20 MG DR capsule Take 1 capsule (20 mg) by mouth 2 times daily 180 capsule 1     ondansetron (ZOFRAN-ODT) 4 MG ODT tab Take 1 tablet (4 mg) by mouth every 6 hours as needed for nausea or vomiting 12 tablet 1     order for DME by Device route continuous Crutches - use as instructed 1 Device 0     oxyCODONE (ROXICODONE) 5 MG tablet        polyethylene glycol (MIRALAX/GLYCOLAX) powder Take 17 g (1 capful) by mouth daily 850 g 3     rivaroxaban ANTICOAGULANT (XARELTO ANTICOAGULANT) 20 MG TABS tablet Take 1 tablet (20 mg) by mouth daily (with dinner) 90 tablet 3     topiramate (TOPAMAX) 25 MG tablet Take 3 tablets (75 mg) by mouth daily 90 tablet 3     ursodiol (ACTIGALL) 300 MG capsule Take 1 capsule (300 mg) by mouth 2 times daily 60 capsule 4     varenicline (CHANTIX AMADO) 0.5 MG X 11 & 1 MG X 42 tablet Take 0.5 mg tab daily for 3 days, THEN 0.5 mg tab twice daily for 4 days, THEN 1 mg twice daily. 53 tablet 0     vitamin  s/Minerals TABS Take 1 tablet by mouth       vitamin B complex with vitamin C (VITAMIN  B COMPLEX) tablet Take 1 tablet by mouth daily 90 tablet 1     vitamin C (ASCORBIC ACID) 1000 MG TABS Take 1,000 mg by mouth       vitamin D3 (CHOLECALCIFEROL) 2000 units tablet Take 1 tablet by mouth daily 90 tablet 1     No Known Allergies    Reviewed and updated as needed this visit by Provider  Tobacco  Allergies  Meds  Problems  Med Hx  Surg Hx  Fam Hx         Review of Systems   ROS COMP: Constitutional, HEENT, cardiovascular, pulmonary, gi and gu systems are negative, except as otherwise noted.      Objective    /80 (BP Location: Right arm, Patient Position: Sitting, Cuff Size: Adult Large)   Pulse 96   Temp 98  F (36.7  C) (Oral)   Resp 18   Ht 1.676 m (5' 6\")   Wt 115.2 kg (254 lb)   SpO2 100%   BMI 41.00 kg/m    Body mass index is " 41 kg/m .  Physical Exam   GENERAL: healthy, alert and no distress  EYES: Eyes grossly normal to inspection, PERRL and conjunctivae and sclerae normal  HENT: ear canals and TM's normal, nose and mouth without ulcers or lesions  NECK: no adenopathy, no asymmetry, masses, or scars and thyroid normal to palpation  RESP: lungs clear to auscultation - no rales, rhonchi or wheezes  CV: regular rate and rhythm, normal S1 S2, no S3 or S4, no murmur, click or rub, no peripheral edema and peripheral pulses strong  ABDOMEN: soft, nontender, no hepatosplenomegaly, no masses and bowel sounds normal  MS: no gross musculoskeletal defects noted, no edema  SKIN: no suspicious lesions or rashes  BACK: no CVA tenderness, no paralumbar tenderness    Diagnostic Test Results:  Labs reviewed in Epic  Results for orders placed or performed in visit on 12/30/19 (from the past 24 hour(s))   XR Cervical Spine 2/3 Views    Narrative    CERVICAL SPINE 2-3 VIEWS 12/30/2019 10:37 AM     HISTORY: steady neck and back pain for over a week but has been  on-going for month; Neck pain    COMPARISON: None.      Impression    IMPRESSION: There is reversal of the cervical lordosis. The vertebral  bodies and disc spaces are unremarkable no significant degenerative  changes are identified..    JAMEY TUCKER MD   XR Thoracic Spine 2 Views    Narrative    THORACIC SPINE TWO VIEWS 12/30/2019 10:41 AM     HISTORY: neck and mid-back pain worsening for the past week but  on-going for a few months; Acute bilateral thoracic back pain    COMPARISON: None.      Impression    IMPRESSION: There is normal osseous alignment.  No fractures are  identified.  The vertebral bodies and disc spaces appear intact.    JAMEY TUCKER MD   CBC with platelets   Result Value Ref Range    WBC 7.3 4.0 - 11.0 10e9/L    RBC Count 2.65 (L) 3.8 - 5.2 10e12/L    Hemoglobin 9.8 (L) 11.7 - 15.7 g/dL    Hematocrit 30.7 (L) 35.0 - 47.0 %     (H) 78 - 100 fl    MCH 37.0 (H) 26.5 - 33.0 pg     MCHC 31.9 31.5 - 36.5 g/dL    RDW 20.4 (H) 10.0 - 15.0 %    Platelet Count 474 (H) 150 - 450 10e9/L   HCG qualitative urine   Result Value Ref Range    HCG Qual Urine Negative NEG^Negative           Assessment & Plan     1. History of pancreatitis  Patient is improved from hospitalization, although having some slight abdominal pain still.  We will recheck the blood work and she does need follow-up with gastroenterology and have an EUS done.   - Lipase  - CBC with platelets  - Comprehensive metabolic panel (BMP + Alb, Alk Phos, ALT, AST, Total. Bili, TP)  - GASTROENTEROLOGY ADULT REF CONSULT ONLY    2. Neck pain/3. Acute bilateral thoracic back pain  Patient is having worsening neck and upper back pain for the past week.  She is very tender to light palpation, x-rays appear to be negative.  Will trial some slight pain medication, but may need to consider CT or MRI of her spine if ongoing pain.  She has some hand numbness but that has been ongoing since her PEA in May 2019.  Denies radiation of pain or numbness to legs.  - XR Cervical Spine 2/3 Views  - lidocaine (LIDODERM) 5 % patch; Place on back daily for 12 hours then remove. To prevent lidocaine toxicity, patient should be patch free for 12 hrs daily.  Dispense: 30 patch; Refill: 0  - acetaminophen-codeine (TYLENOL #3) 300-30 MG tablet; Take 1 tablet by mouth 3 times daily as needed for severe pain  Dispense: 9 tablet; Refill: 0  - XR Thoracic Spine 2 Views    4. Anemia, unspecified type  Likely related to gastric bypass, she was 8.7 on 12/25/2019, today she is 9.8.  - CBC with platelets  - Vitamin B12    5. Encounter for surveillance of injectable contraceptive  She is not pregnant, we will continue her Depo-Provera.  - HCG qualitative urine  - medroxyPROGESTERone (DEPO-PROVERA) syringe 150 mg       Return in about 4 weeks (around 1/27/2020), or if symptoms worsen or fail to improve.  Or pending labs or x-rays    RG Raygoza, NP-C  [facility]  [facility]    This chart was documented by provider using a voice activated software called Dragon in addition to manual typing. There may be vocabulary errors or other grammatical errors due to this.

## 2019-12-30 NOTE — LETTER
December 30, 2019      Hilaria Bonner  [address]  [address]  [place] [id number]        To Whom It May Concern:    Hilaria Bonner was seen in our clinic today. She may return to work without restrictions as of 12/31/19.      Sincerely,        RG Raygoza, NP-C  [facility]

## 2019-12-31 NOTE — TELEPHONE ENCOUNTER
This writer attempted to contact [facility] on 12/31/19      Reason for call provider's message and left message.      If patient calls back:   Registered Nurse called. Follow Triage Call workflow        Tiffanie Lr RN

## 2019-12-31 NOTE — TELEPHONE ENCOUNTER
Please call patient: find out if Tylenol #3 is helping her pain. We had talked about during the visit that the lidoderm patches may not get covered. If she wants to still try those she can pay out of pocket. Otherwise we can trial increasing her gabapentin and consider PT and follow up with sports medicine to help address her back pain.     RG Raygoza, NP-C  [facility]

## 2020-01-02 RX ORDER — GABAPENTIN 100 MG/1
CAPSULE ORAL
Qty: 60 CAPSULE | Refills: 0 | Status: SHIPPED | OUTPATIENT
Start: 2020-01-02 | End: 2020-01-02

## 2020-01-02 RX ORDER — GABAPENTIN 100 MG/1
CAPSULE ORAL
Qty: 60 CAPSULE | Refills: 0 | Status: SHIPPED | OUTPATIENT
Start: 2020-01-02 | End: 2020-01-08

## 2020-01-02 RX ORDER — CYANOCOBALAMIN 1000 UG/ML
1000 INJECTION, SOLUTION INTRAMUSCULAR; SUBCUTANEOUS
Status: DISCONTINUED | OUTPATIENT
Start: 2020-01-02 | End: 2020-05-19

## 2020-01-02 NOTE — TELEPHONE ENCOUNTER
Reason for Call:  Other returning call    Detailed comments: Transferred to RN Line    Phone Number Patient can be reached at: Cell number on file:    Telephone Information:   Mobile [phone]       Best Time: Any    Can we leave a detailed message on this number? Not Applicable    Call taken on 1/2/2020 at 2:05 PM by Laura Wang

## 2020-01-02 NOTE — TELEPHONE ENCOUNTER
This writer attempted to contact patient on 01/02/20      Reason for call notify that rx was sent and left message.      If patient calls back:   Team please notify patient that the rx was sent to patient requested pharmacy. Written did the first attempt.         Joceline Gallo RN

## 2020-01-02 NOTE — TELEPHONE ENCOUNTER
"Routed to RN pool to contact pt for the following reasons listed in Bertha Montano NP prior message (before Rx was faxed).    \"Staff please call patient:   1- Patient can take 100 mg gabapentin in the morning for 2 days, then increase to 100 mg capsule twice a day in the morning and at noon for 2 days then increase to 2 capsules in the morning and 2 at noon for pain.  (she can continue taking her 300 mg capsule at night).      2-provider ordered PT, they will call her in a few business days to start therapy for her back pain     3-she is to follow up with sports medicine for follow up of her back pain: please give her the number to schedule in [place].      4-Provider ordered B-12 injections, patient can make nurse appointment monthly to have them done in the clinic.      If further questions please route back to NP.     Thank you,  RG Raygoza, NP-C  [facility]        "

## 2020-01-02 NOTE — TELEPHONE ENCOUNTER
This writer attempted to contact patient on 01/02/20      Reason for call providers message and left message.      If patient calls back:   Registered Nurse called. Follow Triage Call workflow        Joceline Gallo RN

## 2020-01-02 NOTE — TELEPHONE ENCOUNTER
Provider unable to send new gabapentin order to pharmacy, can on-site provider please print and fax?    Staff please call patient:   1- Patient can take 100 mg gabapentin in the morning for 2 days, then increase to 100 mg capsule twice a day in the morning and at noon for 2 days then increase to 2 capsules in the morning and 2 at noon for pain.  (she can continue taking her 300 mg capsule at night).     2-provider ordered PT, they will call her in a few business days to start therapy for her back pain    3-she is to follow up with sports medicine for follow up of her back pain: please give her the number to schedule in [place].     4-Provider ordered B-12 injections, patient can make nurse appointment monthly to have them done in the clinic.     If further questions please route back to NP.    Thank you,  RG Raygoza, NP-C  [facility]

## 2020-01-02 NOTE — TELEPHONE ENCOUNTER
Patient called back and she will try the B12 injections.    She also is looking for something a little stronger then the Tylenol #3. It is only lasting her about an hour.     Kait Manriquez RN, [facility] Triage

## 2020-01-03 ENCOUNTER — TELEPHONE (OUTPATIENT)
Dept: FAMILY MEDICINE | Facility: CLINIC | Age: 30
End: 2020-01-03

## 2020-01-03 NOTE — TELEPHONE ENCOUNTER
Pt states the 300 MG at night is causing her drowsiness - she doesn't want to take this during the day. Hoping to get something else during the day.     The 300 MG dose at night is also not helping - pt is still not sleeping and tossing/turning throughout the night.     Kermit Horner MA

## 2020-01-03 NOTE — TELEPHONE ENCOUNTER
Patient contacted. Tylenol does not work. 100 gabapentin still makes her drowsy.   I am not sure how physical therapy will work as her and her boyfriend share one vehicle.   She does not want to be on an antidepressant.     She elects to schedule appointment with PCP on Tuesday to discuss pain control and also to receive B12 injection.     Tiffanie Lr RN  [facility]

## 2020-01-03 NOTE — TELEPHONE ENCOUNTER
She prescribed gabapentin to take during the day as well as evening dose to help with pain  She still is on a very low dose and many times we will increase the dose quite a bit

## 2020-01-03 NOTE — TELEPHONE ENCOUNTER
1- cannot take Gabapentin as it causes her to be drowsy.     2- Patient is not sure how getting to physical therapy could be because she is sharing one car with her boyfriend.     3- ortho  did try to call her and so she will call them back to schedule.     4- Assisted patient in scheduling appointment with PCP and elects to get B12 injection at that time.     Tiffanie Lr RN  [facility]

## 2020-01-03 NOTE — TELEPHONE ENCOUNTER
Reason for Call:  Other prescription    Detailed comments: patient saw EMILY Montano NP on 12/30/2019 and she was prescribed a medication that she already takes, gabapentin (NEURONTIN) 100 MG capsule, why was this prescribed when she takes already 300 mg?    Phone Number Patient can be reached at: Cell number on file:    Telephone Information:   Mobile [phone]       Best Time: any    Can we leave a detailed message on this number? YES    Call taken on 1/3/2020 at 11:14 AM by Carina Noel

## 2020-01-03 NOTE — TELEPHONE ENCOUNTER
Noted thank you. She will see PCP next week to follow up on her back pain.  RG Raygoza, NP-C  [facility]

## 2020-01-03 NOTE — TELEPHONE ENCOUNTER
Recommend Tylenol during day.  If wants something stronger needs something to be seen.   We can consider a medication like cymbalta (antidepressant used for pain) or a muscle relaxer to help with pain  I am not comfortable with additional narcotics.  The 100 mg gabapentin during the day would be less sedating than the 300 mg at bedtime.  Could consider trazodone at bedtime to help with sleep  Could also consider physical therapy to help with pain  I am out of the office until Tuesday but will be checking epic over the weekend.

## 2020-01-07 ENCOUNTER — OFFICE VISIT (OUTPATIENT)
Dept: FAMILY MEDICINE | Facility: CLINIC | Age: 30
End: 2020-01-07
Payer: COMMERCIAL

## 2020-01-07 VITALS
DIASTOLIC BLOOD PRESSURE: 62 MMHG | TEMPERATURE: 98.2 F | SYSTOLIC BLOOD PRESSURE: 130 MMHG | HEART RATE: 96 BPM | WEIGHT: 248 LBS | OXYGEN SATURATION: 99 % | BODY MASS INDEX: 39.86 KG/M2 | HEIGHT: 66 IN

## 2020-01-07 DIAGNOSIS — I46.9 CARDIAC ARREST, CAUSE UNSPECIFIED (H): ICD-10-CM

## 2020-01-07 DIAGNOSIS — M54.6 ACUTE BILATERAL THORACIC BACK PAIN: Primary | ICD-10-CM

## 2020-01-07 DIAGNOSIS — E66.01 MORBID (SEVERE) OBESITY DUE TO EXCESS CALORIES (H): ICD-10-CM

## 2020-01-07 DIAGNOSIS — I26.99 PULMONARY EMBOLISM WITH INFARCTION (H): ICD-10-CM

## 2020-01-07 DIAGNOSIS — I47.20 VT (VENTRICULAR TACHYCARDIA) (H): ICD-10-CM

## 2020-01-07 PROCEDURE — 99214 OFFICE O/P EST MOD 30 MIN: CPT | Performed by: PHYSICIAN ASSISTANT

## 2020-01-07 PROCEDURE — 96372 THER/PROPH/DIAG INJ SC/IM: CPT | Performed by: PHYSICIAN ASSISTANT

## 2020-01-07 RX ORDER — TIZANIDINE HYDROCHLORIDE 4 MG/1
4 CAPSULE, GELATIN COATED ORAL 3 TIMES DAILY PRN
Qty: 90 CAPSULE | Refills: 0 | Status: SHIPPED | OUTPATIENT
Start: 2020-01-07 | End: 2020-01-31

## 2020-01-07 RX ADMIN — CYANOCOBALAMIN 1000 MCG: 1000 INJECTION, SOLUTION INTRAMUSCULAR; SUBCUTANEOUS at 10:53

## 2020-01-07 ASSESSMENT — MIFFLIN-ST. JEOR: SCORE: 1866.67

## 2020-01-07 NOTE — PROGRESS NOTES
"Subjective     Hilaria Bonner is a 29 year old female who presents to clinic today for the following health issues:    HPI   Chronic/Recurring Back Pain Follow Up      Where is your back pain located? (Select all that apply) Middle bilateral back and middle lower back     How would you describe your back pain?  stabbing    Where does your back pain spread? nowhere    Since your last clinic visit for back pain, how has your pain changed? gradually worsening    Does your back pain interfere with your job? No    Since your last visit, have you tried any new treatment? No      How many servings of fruits and vegetables do you eat daily?  0-1, patient not really having appetite, vomits if eats too much    On average, how many sweetened beverages do you drink each day (Examples: soda, juice, sweet tea, etc.  Do NOT count diet or artificially sweetened beverages)?  0-1    How many days per week do you miss taking your medication? 0    Saw Bertha (nurse practioner)  A little over  Week ago and took xray of back.  Given tylneol 3 - bertha recommended gabapentin 100 but makes me drowsy and unable to take   Was prescribed lidoderm patches but not covered by insurance  Gabapentin 300 mg makes me go to sleep - but complains of back pain keeping her up at night   Take 300mg gabapentin  and tylneol pm and still tossing and turning.   Can't take 100 mg gabapentin during the day  Has never taken any muscle relaxers  Middle back pain and shot down to low back pain - middle constant pain.    At times have to stop in tracks and pulse in back like a muscle spasm- jerking sensation like a heart pulse in back.  Had those symptoms before but come and go.  Now constant pain.  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 help transfer patients   Stomach is better- ended up in hospital with pancreatitis after \" Only had 2 drinks " "to celebrate birthday\"  Neck pain is improved but Mid back pain not     Patient is well known to me with history of massive PE and resulting cardiac arrest - on lifetime xarelto        Patient Active Problem List   Diagnosis     Morbid obesity (H)     Vitamin D deficiency     Elevated parathyroid hormone     Encounter for smoking cessation counseling     Menorrhagia with irregular cycle     Morbid (severe) obesity due to excess calories (H)     Pulmonary embolism with infarction (H)     Cardiac arrest, cause unspecified (H)     VT (ventricular tachycardia) (H)     Past Surgical History:   Procedure Laterality Date     GYN SURGERY      2 C-sections     KNEE SURGERY  most recently - [id number]    3 surgeries in [place]     LAPAROSCOPIC GASTRIC SLEEVE N/A 3/26/2019    Procedure: Laparoscopic Sleeve Gastrectomy;  Surgeon: Luan Lopez MD;  Location: [facility] OR     ORTHOPEDIC SURGERY      2 knee meniscus surgery       Social History     Tobacco Use     Smoking status: Former Smoker     Years: 1.00     Start date: 2016     Last attempt to quit: 2018     Years since quittin.9     Smokeless tobacco: Never Used   Substance Use Topics     Alcohol use: Not Currently     Alcohol/week: 0.0 standard drinks     Comment: occasional     Family History   Problem Relation Age of Onset     Diabetes Father      Hypertension Father      Breast Cancer Sister 26        surgery only     Cancer Sister      Coronary Artery Disease Other         paternal aunt     Thyroid Disease Other         neice     Coronary Artery Disease Other      Cerebrovascular Disease Other      Breast Cancer Sister      Thyroid Disease Other      Cerebrovascular Disease No family hx of          Current Outpatient Medications   Medication Sig Dispense Refill     B Complex Vitamins (VITAMIN-B COMPLEX) TABS Take 1 tablet by mouth       Calcium Citrate-Vitamin D 500-500 MG-UNIT CHEW Take 1 chew tab by mouth 2 times daily 180 tablet 3     CHANTIX STARTING " MONTH AMADO 0.5 MG X 11 & 1 MG X 42 tablet        childrens multivitamin w/iron (FLINTSTONES COMPLETE) 60 MG chewable tablet Take 1 chew tab by mouth daily       Cyanocobalamin (B-12) 500 MCG SUBL Place 1 tablet under the tongue daily 90 tablet 3     cyanocobalamin (VITAMIN B-12) 1000 MCG tablet Take 1,000 mcg by mouth       ferrous sulfate (FEROSUL) 325 (65 Fe) MG tablet Take 325 mg by mouth       fluticasone (FLONASE) 50 MCG/ACT nasal spray 2 sprays       folic acid (FOLVITE) 1 MG tablet        gabapentin (NEURONTIN) 300 MG capsule Take 1 capsule (300 mg) by mouth nightly as needed 90 capsule 1     magic mouthwash (FIRST-MOUTHWASH BLM) compounding kit Take 30 mLs by mouth every 6 hours as needed for mouth sores 900 mL 2     Multiple Vitamins-Minerals (MULTIVITAMIN ADULTS PO) Take 1 tablet by mouth daily       omeprazole (PRILOSEC) 20 MG DR capsule Take 1 capsule (20 mg) by mouth 2 times daily 180 capsule 1     ondansetron (ZOFRAN-ODT) 4 MG ODT tab Take 1 tablet (4 mg) by mouth every 6 hours as needed for nausea or vomiting 12 tablet 1     order for DME by Device route continuous Crutches - use as instructed 1 Device 0     polyethylene glycol (MIRALAX/GLYCOLAX) powder Take 17 g (1 capful) by mouth daily 850 g 3     rivaroxaban ANTICOAGULANT (XARELTO ANTICOAGULANT) 20 MG TABS tablet Take 1 tablet (20 mg) by mouth daily (with dinner) 90 tablet 3            topiramate (TOPAMAX) 25 MG tablet Take 3 tablets (75 mg) by mouth daily 90 tablet 3     ursodiol (ACTIGALL) 300 MG capsule Take 1 capsule (300 mg) by mouth 2 times daily 60 capsule 4     vitamin  s/Minerals TABS Take 1 tablet by mouth       vitamin B complex with vitamin C (VITAMIN  B COMPLEX) tablet Take 1 tablet by mouth daily 90 tablet 1     vitamin C (ASCORBIC ACID) 1000 MG TABS Take 1,000 mg by mouth       vitamin D3 (CHOLECALCIFEROL) 2000 units tablet Take 1 tablet by mouth daily 90 tablet 1     oxyCODONE (ROXICODONE) 5 MG tablet        varenicline (CHANTIX  "AMADO) 0.5 MG X 11 & 1 MG X 42 tablet Take 0.5 mg tab daily for 3 days, THEN 0.5 mg tab twice daily for 4 days, THEN 1 mg twice daily. (Patient not taking: Reported on 1/7/2020) 53 tablet 0     BP Readings from Last 3 Encounters:   01/07/20 130/62   12/30/19 122/80   11/08/19 138/81    Wt Readings from Last 3 Encounters:   01/07/20 112.5 kg (248 lb)   12/30/19 115.2 kg (254 lb)   11/08/19 115.5 kg (254 lb 9.6 oz)                      Reviewed and updated as needed this visit by Provider  Tobacco  Allergies  Meds  Problems  Med Hx  Surg Hx  Fam Hx  Soc Hx          Review of Systems   ROS COMP: Constitutional, HEENT, cardiovascular, pulmonary, gi and gu systems are negative, except as otherwise noted.      Objective    /62 (BP Location: Right arm, Patient Position: Chair, Cuff Size: Adult Large)   Pulse 96   Temp 98.2  F (36.8  C) (Oral)   Ht 1.676 m (5' 6\")   Wt 112.5 kg (248 lb)   LMP  (LMP Unknown)   SpO2 99%   Breastfeeding No   BMI 40.03 kg/m    Body mass index is 40.03 kg/m .  Physical Exam   GENERAL: alert, no distress and obese  NECK: no adenopathy, no asymmetry, masses, or scars and thyroid normal to palpation  RESP: lungs clear to auscultation - no rales, rhonchi or wheezes  CV: regular rate and rhythm, normal S1 S2, no S3 or S4, no murmur, click or rub, no peripheral edema and peripheral pulses strong  ABDOMEN: soft, nontender, no hepatosplenomegaly, no masses and bowel sounds normal  MS: tender to right and left of thoracic spine.  No tenderness of neck or low back.  Tender to even light touch.  Normal gait.  Pain limits flexion, extension and rotation      Diagnostic Test Results:  none         Assessment & Plan     1. Acute bilateral thoracic back pain  Referred to physical therapy and trial of zanaflex.  Consider further imaging if unimproved over the next couple weeks - has appointment with sports medicine already scheduled 1/31/20  - tiZANidine (ZANAFLEX) 4 MG capsule; Take 1 " capsule (4 mg) by mouth 3 times daily as needed for muscle spasms  Dispense: 90 capsule; Refill: 0  - [facility]; Future    2. Pulmonary embolism with infarction (H)  No shortness of breath or tachycardia or dyspnea- on xarelto indefinitely     3. Cardiac arrest, cause unspecified (H)  Related to PE     4. VT (ventricular tachycardia) (H)  Related to PE - no chest pain or sob    5. Morbid (severe) obesity due to excess calories (H)  Has appointment with GI- history of lap sleeve 3/2019          Patient Instructions   Take tizanidine 4 mg up to three times a day as needed for pain  Schedule physical therapy with [facility] ([phone]).  They have several locations around the [facility].    Keep appointment with sports medicine  Return urgently if any change in symptoms like increasing pain,numbness, weakness or other change in symptoms       Return in about 24 days (around 1/31/2020), or if symptoms worsen or fail to improve.    Crissy Madrigal PA-C  [facility]

## 2020-01-07 NOTE — PATIENT INSTRUCTIONS
Take tizanidine 4 mg up to three times a day as needed for pain  Schedule physical therapy with [facility] ([phone]).  They have several locations around the [facility].    Keep appointment with sports medicine  Return urgently if any change in symptoms like increasing pain,numbness, weakness or other change in symptoms

## 2020-01-08 PROBLEM — I46.9 CARDIAC ARREST, CAUSE UNSPECIFIED (H): Status: ACTIVE | Noted: 2020-01-08

## 2020-01-08 PROBLEM — I47.20 VT (VENTRICULAR TACHYCARDIA) (H): Status: ACTIVE | Noted: 2020-01-08

## 2020-01-27 ENCOUNTER — TELEPHONE (OUTPATIENT)
Dept: ORTHOPEDICS | Facility: CLINIC | Age: 30
End: 2020-01-27

## 2020-01-31 ENCOUNTER — OFFICE VISIT (OUTPATIENT)
Dept: ORTHOPEDICS | Facility: CLINIC | Age: 30
End: 2020-01-31
Attending: NURSE PRACTITIONER
Payer: COMMERCIAL

## 2020-01-31 VITALS
DIASTOLIC BLOOD PRESSURE: 112 MMHG | BODY MASS INDEX: 39.86 KG/M2 | HEIGHT: 66 IN | WEIGHT: 248 LBS | SYSTOLIC BLOOD PRESSURE: 153 MMHG | HEART RATE: 104 BPM

## 2020-01-31 DIAGNOSIS — M54.2 CERVICALGIA: ICD-10-CM

## 2020-01-31 DIAGNOSIS — M54.6 ACUTE BILATERAL THORACIC BACK PAIN: Primary | ICD-10-CM

## 2020-01-31 PROCEDURE — 99214 OFFICE O/P EST MOD 30 MIN: CPT | Performed by: FAMILY MEDICINE

## 2020-01-31 ASSESSMENT — MIFFLIN-ST. JEOR: SCORE: 1866.67

## 2020-01-31 ASSESSMENT — PAIN SCALES - GENERAL: PAINLEVEL: SEVERE PAIN (6)

## 2020-01-31 NOTE — PROGRESS NOTES
"CHIEF COMPLAINT:  Back Pain (Upper back and neck pain, follow up for right ankle and foot. )       HISTORY OF PRESENT ILLNESS  Ms. Bonner is a pleasant 29 year old year old female who presents to clinic today with thoracic back pain.  Hilaria explains that pain began aroud mid - December without acute inciting event or trauma.  Shoots from mid back near he bra line down to low back at times.  Sharp. Muscle spasm feelings at times with jolts of pain.    Neck pain as well. Saw her NP for this. Bilateral cervical region, worse with movement.  No radicular symptoms to UE.  Improving slowly.    She notes her ankle feels well overall however.  No tingling of foot as long as she wears her orthotics and athletic shoes help also.    Onset: no inciting event  Location: mid thoracic back pain  Quality:  Knife-like sharp pain of thoracic spine  Duration: 1.5 months  Severity: severe at worst   Timing: Persistent  Modifying factors: Nothing improves pain, worse moving around \"a lot\" and working, transfers with patients at job  Associated signs & symptoms: None  Previous similar pain: None  Treatments to date: Therapy prescribed but has not been to a visit yet. Tylenol #3, lidocaine patch,     MEDICAL HISTORY  Patient Active Problem List   Diagnosis     Morbid obesity (H)     Vitamin D deficiency     Elevated parathyroid hormone     Encounter for smoking cessation counseling     Menorrhagia with irregular cycle     Morbid (severe) obesity due to excess calories (H)     Pulmonary embolism with infarction (H)     Cardiac arrest, cause unspecified (H)     VT (ventricular tachycardia) (H)       Current Outpatient Medications   Medication Sig Dispense Refill     B Complex Vitamins (VITAMIN-B COMPLEX) TABS Take 1 tablet by mouth       Calcium Citrate-Vitamin D 500-500 MG-UNIT CHEW Take 1 chew tab by mouth 2 times daily 180 tablet 3     CHANTIX STARTING MONTH AMADO 0.5 MG X 11 & 1 MG X 42 tablet        childrens multivitamin w/iron " (FLINTSTONES COMPLETE) 60 MG chewable tablet Take 1 chew tab by mouth daily       Cyanocobalamin (B-12) 500 MCG SUBL Place 1 tablet under the tongue daily 90 tablet 3     cyanocobalamin (VITAMIN B-12) 1000 MCG tablet Take 1,000 mcg by mouth       ferrous sulfate (FEROSUL) 325 (65 Fe) MG tablet Take 325 mg by mouth       fluticasone (FLONASE) 50 MCG/ACT nasal spray 2 sprays       folic acid (FOLVITE) 1 MG tablet        gabapentin (NEURONTIN) 300 MG capsule Take 1 capsule (300 mg) by mouth nightly as needed 90 capsule 1     magic mouthwash (FIRST-MOUTHWASH BLM) compounding kit Take 30 mLs by mouth every 6 hours as needed for mouth sores 900 mL 2     Multiple Vitamins-Minerals (MULTIVITAMIN ADULTS PO) Take 1 tablet by mouth daily       omeprazole (PRILOSEC) 20 MG DR capsule Take 1 capsule (20 mg) by mouth 2 times daily 180 capsule 1     ondansetron (ZOFRAN-ODT) 4 MG ODT tab Take 1 tablet (4 mg) by mouth every 6 hours as needed for nausea or vomiting 12 tablet 1     order for DME by Device route continuous Crutches - use as instructed 1 Device 0     oxyCODONE (ROXICODONE) 5 MG tablet        polyethylene glycol (MIRALAX/GLYCOLAX) powder Take 17 g (1 capful) by mouth daily 850 g 3     rivaroxaban ANTICOAGULANT (XARELTO ANTICOAGULANT) 20 MG TABS tablet Take 1 tablet (20 mg) by mouth daily (with dinner) 90 tablet 3     tiZANidine (ZANAFLEX) 4 MG capsule Take 1 capsule (4 mg) by mouth 3 times daily as needed for muscle spasms 90 capsule 0     topiramate (TOPAMAX) 25 MG tablet Take 3 tablets (75 mg) by mouth daily 90 tablet 3     ursodiol (ACTIGALL) 300 MG capsule Take 1 capsule (300 mg) by mouth 2 times daily 60 capsule 4     varenicline (CHANTIX AMADO) 0.5 MG X 11 & 1 MG X 42 tablet Take 0.5 mg tab daily for 3 days, THEN 0.5 mg tab twice daily for 4 days, THEN 1 mg twice daily. (Patient not taking: Reported on 1/7/2020) 53 tablet 0     vitamin  s/Minerals TABS Take 1 tablet by mouth       vitamin B complex with vitamin C  "(VITAMIN  B COMPLEX) tablet Take 1 tablet by mouth daily 90 tablet 1     vitamin C (ASCORBIC ACID) 1000 MG TABS Take 1,000 mg by mouth       vitamin D3 (CHOLECALCIFEROL) 2000 units tablet Take 1 tablet by mouth daily 90 tablet 1       No Known Allergies    Family History   Problem Relation Age of Onset     Diabetes Father      Hypertension Father      Breast Cancer Sister 26        surgery only     Cancer Sister      Coronary Artery Disease Other         paternal aunt     Thyroid Disease Other         neice     Coronary Artery Disease Other      Cerebrovascular Disease Other      Breast Cancer Sister      Thyroid Disease Other      Cerebrovascular Disease No family hx of        Additional medical/Social/Surgical histories reviewed in [facility] and updated as appropriate.     REVIEW OF SYSTEMS (1/31/2020)  A 10-point review of systems was obtained and is negative except for as noted in the HPI.      PHYSICAL EXAM  BP (!) 153/112   Pulse 104   Ht 1.676 m (5' 6\")   Wt 112.5 kg (248 lb)   LMP  (LMP Unknown)   BMI 40.03 kg/m      General  - normal appearance, in no obvious distress  Musculoskeletal - cervical spine  - inspection: normal bone and joint alignment, no obvious kyphosis  - palpation: Bilateral cervical tenderness to palpation.  - ROM: Decreased ROM in flexion, sidebending and rotation.  - strength: upper extremities 5/5 in all planes  - special tests:  (-) Spurling bilaterally  (-) Maicol's reflex  Musculoskeletal - thoracic spine  - inspection: normal bone and joint alignment, no obvious scoliosis  - palpation: bilateral thoracic paravertebral spasm, tenderness around T10 bilaterally at paraspinal musculature. No rib angle tenderness  - ROM: pain with bilateral rotation, flexion past 30 deg, extension  - strength: lower extremities 5/5 in all planes  - special tests:  (-) straight leg raise bilaterally  (-) slump test  Neuro  - patellar and Achilles DTRs 2+ bilaterally, no sensory or motor deficit, grossly " normal coordination, normal muscle tone  Skin  - no ecchymosis, erythema, warmth, or induration, no obvious rash  Psych  - interactive, appropriate, normal mood and affect    IMAGING :   THORACIC SPINE TWO VIEWS 12/30/2019 10:41 AM      HISTORY: neck and mid-back pain worsening for the past week but  on-going for a few months; Acute bilateral thoracic back pain     COMPARISON: None.                                                                      IMPRESSION: There is normal osseous alignment.  No fractures are  identified.  The vertebral bodies and disc spaces appear intact.     JAMEY TUCKER MD    CERVICAL SPINE 2-3 VIEWS 12/30/2019 10:37 AM      HISTORY: steady neck and back pain for over a week but has been  on-going for month; Neck pain     COMPARISON: None.                                                                   IMPRESSION: There is reversal of the cervical lordosis. The vertebral  bodies and disc spaces are unremarkable no significant degenerative  changes are identified..     JAMEY TUCKER MD     ASSESSMENT & PLAN  Ms. Bonner is a 29 year old year old female PMHx of pancreatitis, PE on xarelto who presents to clinic today with acute onset of cervical and thoracic back pain x 1.5 months. Suspect musculoligamentous etiology, no DJD on radiographs.  Cannot tolerate NSAIDs due to anticoagulants.    Diagnosis: Acute thoracic back pain without radiculitis, cervicalgia    -Encouraged physical therapy  -Medrol pack  -Tizanidine at bedtime  -Heat/ice therapy  -Follow up 4 weeks; mri if no improvement despite PT.    It was a pleasure seeing Hilaria today.    Yash Melgar DO, [facility]  Primary Care Sports Medicine

## 2020-01-31 NOTE — PATIENT INSTRUCTIONS
Thanks for coming today.  Ortho/Sports Medicine Clinic  [address] [place] [address]    To schedule future appointments in Ortho Clinic, you may call [phone].    To schedule ordered imaging by your provider:   Call [facility] [phone]    To schedule an injection ordered by your provider:  Call [facility] Injection scheduling line: [phone]  [facility] available online at:  [email or web address]    Please call if any further questions or concerns ([phone]).  Clinic hours 8 am to 5 pm.    Return to clinic (call) if symptoms worsen or fail to improve.

## 2020-01-31 NOTE — LETTER
"    1/31/2020         RE: Hilaria Bonner  [address]  [address]  [facility] [address]        Dear Colleague,    Thank you for referring your patient, Hilaria Bonner, to the [facility]. Please see a copy of my visit note below.            CHIEF COMPLAINT:  Back Pain (Upper back and neck pain, follow up for right ankle and foot. )       HISTORY OF PRESENT ILLNESS  Ms. Bonner is a pleasant 29 year old year old female who presents to clinic today with thoracic back pain.  Hilaria explains that pain began aroud mid - December without acute inciting event or trauma.  Shoots from mid back near he bra line down to low back at times.  Sharp. Muscle spasm feelings at times with jolts of pain.    Neck pain as well. Saw her NP for this. Bilateral cervical region, worse with movement.  No radicular symptoms to UE.  Improving slowly.    She notes her ankle feels well overall however.  No tingling of foot as long as she wears her orthotics and athletic shoes help also.    Onset: no inciting event  Location: mid thoracic back pain  Quality:  Knife-like sharp pain of thoracic spine  Duration: 1.5 months  Severity: severe at worst   Timing: Persistent  Modifying factors: Nothing improves pain, worse moving around \"a lot\" and working, transfers with patients at job  Associated signs & symptoms: None  Previous similar pain: None  Treatments to date: Therapy prescribed but has not been to a visit yet. Tylenol #3, lidocaine patch,     MEDICAL HISTORY  Patient Active Problem List   Diagnosis     Morbid obesity (H)     Vitamin D deficiency     Elevated parathyroid hormone     Encounter for smoking cessation counseling     Menorrhagia with irregular cycle     Morbid (severe) obesity due to excess calories (H)     Pulmonary embolism with infarction (H)     Cardiac arrest, cause unspecified (H)     VT (ventricular tachycardia) (H)       Current Outpatient Medications   Medication Sig Dispense Refill     B Complex " Vitamins (VITAMIN-B COMPLEX) TABS Take 1 tablet by mouth       Calcium Citrate-Vitamin D 500-500 MG-UNIT CHEW Take 1 chew tab by mouth 2 times daily 180 tablet 3     CHANTIX STARTING MONTH AMADO 0.5 MG X 11 & 1 MG X 42 tablet        childrens multivitamin w/iron (FLINTSTONES COMPLETE) 60 MG chewable tablet Take 1 chew tab by mouth daily       Cyanocobalamin (B-12) 500 MCG SUBL Place 1 tablet under the tongue daily 90 tablet 3     cyanocobalamin (VITAMIN B-12) 1000 MCG tablet Take 1,000 mcg by mouth       ferrous sulfate (FEROSUL) 325 (65 Fe) MG tablet Take 325 mg by mouth       fluticasone (FLONASE) 50 MCG/ACT nasal spray 2 sprays       folic acid (FOLVITE) 1 MG tablet        gabapentin (NEURONTIN) 300 MG capsule Take 1 capsule (300 mg) by mouth nightly as needed 90 capsule 1     magic mouthwash (FIRST-MOUTHWASH BLM) compounding kit Take 30 mLs by mouth every 6 hours as needed for mouth sores 900 mL 2     Multiple Vitamins-Minerals (MULTIVITAMIN ADULTS PO) Take 1 tablet by mouth daily       omeprazole (PRILOSEC) 20 MG DR capsule Take 1 capsule (20 mg) by mouth 2 times daily 180 capsule 1     ondansetron (ZOFRAN-ODT) 4 MG ODT tab Take 1 tablet (4 mg) by mouth every 6 hours as needed for nausea or vomiting 12 tablet 1     order for DME by Device route continuous Crutches - use as instructed 1 Device 0     oxyCODONE (ROXICODONE) 5 MG tablet        polyethylene glycol (MIRALAX/GLYCOLAX) powder Take 17 g (1 capful) by mouth daily 850 g 3     rivaroxaban ANTICOAGULANT (XARELTO ANTICOAGULANT) 20 MG TABS tablet Take 1 tablet (20 mg) by mouth daily (with dinner) 90 tablet 3     tiZANidine (ZANAFLEX) 4 MG capsule Take 1 capsule (4 mg) by mouth 3 times daily as needed for muscle spasms 90 capsule 0     topiramate (TOPAMAX) 25 MG tablet Take 3 tablets (75 mg) by mouth daily 90 tablet 3     ursodiol (ACTIGALL) 300 MG capsule Take 1 capsule (300 mg) by mouth 2 times daily 60 capsule 4     varenicline (CHANTIX AMADO) 0.5 MG X 11 & 1  "MG X 42 tablet Take 0.5 mg tab daily for 3 days, THEN 0.5 mg tab twice daily for 4 days, THEN 1 mg twice daily. (Patient not taking: Reported on 1/7/2020) 53 tablet 0     vitamin  s/Minerals TABS Take 1 tablet by mouth       vitamin B complex with vitamin C (VITAMIN  B COMPLEX) tablet Take 1 tablet by mouth daily 90 tablet 1     vitamin C (ASCORBIC ACID) 1000 MG TABS Take 1,000 mg by mouth       vitamin D3 (CHOLECALCIFEROL) 2000 units tablet Take 1 tablet by mouth daily 90 tablet 1       No Known Allergies    Family History   Problem Relation Age of Onset     Diabetes Father      Hypertension Father      Breast Cancer Sister 26        surgery only     Cancer Sister      Coronary Artery Disease Other         paternal aunt     Thyroid Disease Other         neice     Coronary Artery Disease Other      Cerebrovascular Disease Other      Breast Cancer Sister      Thyroid Disease Other      Cerebrovascular Disease No family hx of        Additional medical/Social/Surgical histories reviewed in [facility] and updated as appropriate.     REVIEW OF SYSTEMS (1/31/2020)  A 10-point review of systems was obtained and is negative except for as noted in the HPI.      PHYSICAL EXAM  BP (!) 153/112   Pulse 104   Ht 1.676 m (5' 6\")   Wt 112.5 kg (248 lb)   LMP  (LMP Unknown)   BMI 40.03 kg/m       General  - normal appearance, in no obvious distress  Musculoskeletal - cervical spine  - inspection: normal bone and joint alignment, no obvious kyphosis  - palpation: Bilateral cervical tenderness to palpation.  - ROM: Decreased ROM in flexion, sidebending and rotation.  - strength: upper extremities 5/5 in all planes  - special tests:  (-) Spurling bilaterally  (-) Maicol's reflex  Musculoskeletal - thoracic spine  - inspection: normal bone and joint alignment, no obvious scoliosis  - palpation: bilateral thoracic paravertebral spasm, tenderness around T10 bilaterally at paraspinal musculature. No rib angle tenderness  - ROM: pain with " bilateral rotation, flexion past 30 deg, extension  - strength: lower extremities 5/5 in all planes  - special tests:  (-) straight leg raise bilaterally  (-) slump test  Neuro  - patellar and Achilles DTRs 2+ bilaterally, no sensory or motor deficit, grossly normal coordination, normal muscle tone  Skin  - no ecchymosis, erythema, warmth, or induration, no obvious rash  Psych  - interactive, appropriate, normal mood and affect    IMAGING :   THORACIC SPINE TWO VIEWS 12/30/2019 10:41 AM      HISTORY: neck and mid-back pain worsening for the past week but  on-going for a few months; Acute bilateral thoracic back pain     COMPARISON: None.                                                                      IMPRESSION: There is normal osseous alignment.  No fractures are  identified.  The vertebral bodies and disc spaces appear intact.     JAMEY TUCKER MD    CERVICAL SPINE 2-3 VIEWS 12/30/2019 10:37 AM      HISTORY: steady neck and back pain for over a week but has been  on-going for month; Neck pain     COMPARISON: None.                                                                   IMPRESSION: There is reversal of the cervical lordosis. The vertebral  bodies and disc spaces are unremarkable no significant degenerative  changes are identified..     JAMEY TUCKER MD     ASSESSMENT & PLAN  Ms. Bonner is a 29 year old year old female PMHx of pancreatitis, PE on xarelto who presents to clinic today with acute onset of cervical and thoracic back pain x 1.5 months. Suspect musculoligamentous etiology, no DJD on radiographs.  Cannot tolerate NSAIDs due to anticoagulants.    Diagnosis: Acute thoracic back pain without radiculitis, cervicalgia    -Encouraged physical therapy  -Medrol pack  -Tizanidine at bedtime  -Heat/ice therapy  -Follow up 4 weeks; mri if no improvement despite PT.    It was a pleasure seeing Hilaria today.    Yash Melgar DO, [facility]  Primary Care Sports Medicine      Again, thank you for allowing me to  participate in the care of your patient.        Sincerely,        Yash Melgar, DO

## 2020-02-06 RX ORDER — METHYLPREDNISOLONE 4 MG
TABLET, DOSE PACK ORAL
Qty: 21 TABLET | Refills: 0 | Status: SHIPPED | OUTPATIENT
Start: 2020-02-06 | End: 2020-03-09

## 2020-03-09 ENCOUNTER — OFFICE VISIT (OUTPATIENT)
Dept: FAMILY MEDICINE | Facility: CLINIC | Age: 30
End: 2020-03-09
Payer: COMMERCIAL

## 2020-03-09 VITALS
TEMPERATURE: 98.4 F | HEIGHT: 66 IN | OXYGEN SATURATION: 97 % | WEIGHT: 235.5 LBS | DIASTOLIC BLOOD PRESSURE: 87 MMHG | SYSTOLIC BLOOD PRESSURE: 133 MMHG | BODY MASS INDEX: 37.85 KG/M2 | HEART RATE: 100 BPM | RESPIRATION RATE: 18 BRPM

## 2020-03-09 DIAGNOSIS — Z72.0 TOBACCO ABUSE: ICD-10-CM

## 2020-03-09 DIAGNOSIS — D64.9 ANEMIA, UNSPECIFIED TYPE: ICD-10-CM

## 2020-03-09 DIAGNOSIS — E66.01 MORBID OBESITY (H): ICD-10-CM

## 2020-03-09 DIAGNOSIS — F10.10 ALCOHOL ABUSE, EPISODIC DRINKING BEHAVIOR: ICD-10-CM

## 2020-03-09 DIAGNOSIS — R10.84 ABDOMINAL PAIN, GENERALIZED: ICD-10-CM

## 2020-03-09 DIAGNOSIS — Z98.84 STATUS POST GASTRIC BYPASS FOR OBESITY: ICD-10-CM

## 2020-03-09 DIAGNOSIS — I26.09 OTHER PULMONARY EMBOLISM WITH ACUTE COR PULMONALE, UNSPECIFIED CHRONICITY (H): ICD-10-CM

## 2020-03-09 DIAGNOSIS — Z30.42 ENCOUNTER FOR SURVEILLANCE OF INJECTABLE CONTRACEPTIVE: ICD-10-CM

## 2020-03-09 DIAGNOSIS — S01.01XA LACERATION OF SCALP, INITIAL ENCOUNTER: Primary | ICD-10-CM

## 2020-03-09 PROBLEM — E21.1 SECONDARY HYPERPARATHYROIDISM, NOT ELSEWHERE CLASSIFIED (H): Status: ACTIVE | Noted: 2020-03-09

## 2020-03-09 LAB
ALBUMIN SERPL-MCNC: 3.4 G/DL (ref 3.4–5)
ALP SERPL-CCNC: 92 U/L (ref 40–150)
ALT SERPL W P-5'-P-CCNC: 17 U/L (ref 0–50)
AMYLASE SERPL-CCNC: 58 U/L (ref 30–110)
ANION GAP SERPL CALCULATED.3IONS-SCNC: 9 MMOL/L (ref 3–14)
AST SERPL W P-5'-P-CCNC: 17 U/L (ref 0–45)
BILIRUB SERPL-MCNC: 0.3 MG/DL (ref 0.2–1.3)
BUN SERPL-MCNC: 5 MG/DL (ref 7–30)
CALCIUM SERPL-MCNC: 9.2 MG/DL (ref 8.5–10.1)
CHLORIDE SERPL-SCNC: 107 MMOL/L (ref 94–109)
CO2 SERPL-SCNC: 27 MMOL/L (ref 20–32)
CREAT SERPL-MCNC: 0.49 MG/DL (ref 0.52–1.04)
ERYTHROCYTE [DISTWIDTH] IN BLOOD BY AUTOMATED COUNT: 22.9 % (ref 10–15)
GFR SERPL CREATININE-BSD FRML MDRD: >90 ML/MIN/{1.73_M2}
GLUCOSE SERPL-MCNC: 88 MG/DL (ref 70–99)
HCT VFR BLD AUTO: 33.5 % (ref 35–47)
HGB BLD-MCNC: 11.3 G/DL (ref 11.7–15.7)
LIPASE SERPL-CCNC: 83 U/L (ref 73–393)
MAGNESIUM SERPL-MCNC: 2.1 MG/DL (ref 1.6–2.3)
MCH RBC QN AUTO: 36.7 PG (ref 26.5–33)
MCHC RBC AUTO-ENTMCNC: 33.7 G/DL (ref 31.5–36.5)
MCV RBC AUTO: 109 FL (ref 78–100)
PLATELET # BLD AUTO: 449 10E9/L (ref 150–450)
POTASSIUM SERPL-SCNC: 2.9 MMOL/L (ref 3.4–5.3)
PROT SERPL-MCNC: 7 G/DL (ref 6.8–8.8)
RBC # BLD AUTO: 3.08 10E12/L (ref 3.8–5.2)
SODIUM SERPL-SCNC: 143 MMOL/L (ref 133–144)
WBC # BLD AUTO: 6.4 10E9/L (ref 4–11)

## 2020-03-09 PROCEDURE — 83735 ASSAY OF MAGNESIUM: CPT | Performed by: NURSE PRACTITIONER

## 2020-03-09 PROCEDURE — 80053 COMPREHEN METABOLIC PANEL: CPT | Performed by: NURSE PRACTITIONER

## 2020-03-09 PROCEDURE — 99214 OFFICE O/P EST MOD 30 MIN: CPT | Mod: 25 | Performed by: NURSE PRACTITIONER

## 2020-03-09 PROCEDURE — 96372 THER/PROPH/DIAG INJ SC/IM: CPT | Performed by: NURSE PRACTITIONER

## 2020-03-09 PROCEDURE — 85027 COMPLETE CBC AUTOMATED: CPT | Performed by: NURSE PRACTITIONER

## 2020-03-09 PROCEDURE — 83690 ASSAY OF LIPASE: CPT | Performed by: NURSE PRACTITIONER

## 2020-03-09 PROCEDURE — 36415 COLL VENOUS BLD VENIPUNCTURE: CPT | Performed by: NURSE PRACTITIONER

## 2020-03-09 PROCEDURE — 82150 ASSAY OF AMYLASE: CPT | Performed by: NURSE PRACTITIONER

## 2020-03-09 RX ADMIN — CYANOCOBALAMIN 1000 MCG: 1000 INJECTION, SOLUTION INTRAMUSCULAR; SUBCUTANEOUS at 10:39

## 2020-03-09 ASSESSMENT — PAIN SCALES - GENERAL: PAINLEVEL: NO PAIN (0)

## 2020-03-09 ASSESSMENT — MIFFLIN-ST. JEOR: SCORE: 1809.97
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
## 2020-03-09 NOTE — NURSING NOTE
Clinic Administered Medication Documentation      Injectable Medication Documentation    Patient was given Cyanocobalamin (B-12). Prior to medication administration, verified patients identity using patient s name and date of birth. Please see MAR and medication order for additional information. Patient instructed to remain in clinic for 15 minutes, report any adverse reaction to staff immediately  and stay in clinic after the injection but patient declined.      Was entire vial of medication used? Yes  Vial/Syringe: Single dose vial  Expiration Date:  FEB 2021  Was this medication supplied by the patient? No     Regine MCKINNON
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
## 2020-03-10 ENCOUNTER — HEALTH MAINTENANCE LETTER (OUTPATIENT)
Age: 30
End: 2020-03-10

## 2020-03-10 DIAGNOSIS — E87.6 HYPOKALEMIA: Primary | ICD-10-CM

## 2020-03-10 RX ORDER — POTASSIUM CHLORIDE 1500 MG/1
20 TABLET, EXTENDED RELEASE ORAL 2 TIMES DAILY
Qty: 6 TABLET | Refills: 0 | Status: SHIPPED | OUTPATIENT
Start: 2020-03-10 | End: 2020-04-17

## 2020-03-18 ENCOUNTER — VIRTUAL VISIT (OUTPATIENT)
Dept: SURGERY | Facility: CLINIC | Age: 30
End: 2020-03-18
Payer: COMMERCIAL

## 2020-03-18 ENCOUNTER — OFFICE VISIT (OUTPATIENT)
Dept: FAMILY MEDICINE | Facility: CLINIC | Age: 30
End: 2020-03-18
Payer: COMMERCIAL

## 2020-03-18 VITALS
HEART RATE: 105 BPM | HEIGHT: 66 IN | RESPIRATION RATE: 18 BRPM | TEMPERATURE: 98.8 F | DIASTOLIC BLOOD PRESSURE: 79 MMHG | OXYGEN SATURATION: 100 % | WEIGHT: 236 LBS | SYSTOLIC BLOOD PRESSURE: 126 MMHG | BODY MASS INDEX: 37.93 KG/M2

## 2020-03-18 DIAGNOSIS — Z98.84 S/P LAPAROSCOPIC SLEEVE GASTRECTOMY: Primary | ICD-10-CM

## 2020-03-18 DIAGNOSIS — E66.01 MORBID (SEVERE) OBESITY DUE TO EXCESS CALORIES (H): ICD-10-CM

## 2020-03-18 DIAGNOSIS — E66.01 MORBID OBESITY (H): ICD-10-CM

## 2020-03-18 DIAGNOSIS — E87.6 HYPOKALEMIA: Primary | ICD-10-CM

## 2020-03-18 DIAGNOSIS — M25.572 PAIN IN JOINT INVOLVING ANKLE AND FOOT, LEFT: ICD-10-CM

## 2020-03-18 DIAGNOSIS — E21.1 SECONDARY HYPERPARATHYROIDISM, NOT ELSEWHERE CLASSIFIED (H): ICD-10-CM

## 2020-03-18 DIAGNOSIS — Z98.84 S/P LAPAROSCOPIC SLEEVE GASTRECTOMY: ICD-10-CM

## 2020-03-18 LAB
ANION GAP SERPL CALCULATED.3IONS-SCNC: 7 MMOL/L (ref 3–14)
BUN SERPL-MCNC: 4 MG/DL (ref 7–30)
CALCIUM SERPL-MCNC: 9.1 MG/DL (ref 8.5–10.1)
CHLORIDE SERPL-SCNC: 105 MMOL/L (ref 94–109)
CHOLEST SERPL-MCNC: 169 MG/DL
CO2 SERPL-SCNC: 31 MMOL/L (ref 20–32)
CREAT SERPL-MCNC: 0.58 MG/DL (ref 0.52–1.04)
FERRITIN SERPL-MCNC: 244 NG/ML (ref 12–150)
GFR SERPL CREATININE-BSD FRML MDRD: >90 ML/MIN/{1.73_M2}
GLUCOSE SERPL-MCNC: 101 MG/DL (ref 70–99)
HBA1C MFR BLD: 5.2 % (ref 0–5.6)
HDLC SERPL-MCNC: 78 MG/DL
LDLC SERPL CALC-MCNC: 70 MG/DL
MAGNESIUM SERPL-MCNC: 2.1 MG/DL (ref 1.6–2.3)
NONHDLC SERPL-MCNC: 91 MG/DL
POTASSIUM SERPL-SCNC: 3.1 MMOL/L (ref 3.4–5.3)
PTH-INTACT SERPL-MCNC: 90 PG/ML (ref 18–80)
SODIUM SERPL-SCNC: 143 MMOL/L (ref 133–144)
TRIGL SERPL-MCNC: 104 MG/DL
TSH SERPL DL<=0.005 MIU/L-ACNC: 0.61 MU/L (ref 0.4–4)
VIT B12 SERPL-MCNC: 420 PG/ML (ref 193–986)

## 2020-03-18 PROCEDURE — 99000 SPECIMEN HANDLING OFFICE-LAB: CPT | Performed by: NURSE PRACTITIONER

## 2020-03-18 PROCEDURE — 96372 THER/PROPH/DIAG INJ SC/IM: CPT | Performed by: NURSE PRACTITIONER

## 2020-03-18 PROCEDURE — 84590 ASSAY OF VITAMIN A: CPT | Mod: 90 | Performed by: NURSE PRACTITIONER

## 2020-03-18 PROCEDURE — 83036 HEMOGLOBIN GLYCOSYLATED A1C: CPT | Performed by: NURSE PRACTITIONER

## 2020-03-18 PROCEDURE — 80048 BASIC METABOLIC PNL TOTAL CA: CPT | Performed by: NURSE PRACTITIONER

## 2020-03-18 PROCEDURE — 36415 COLL VENOUS BLD VENIPUNCTURE: CPT | Performed by: NURSE PRACTITIONER

## 2020-03-18 PROCEDURE — 83970 ASSAY OF PARATHORMONE: CPT | Performed by: NURSE PRACTITIONER

## 2020-03-18 PROCEDURE — 82728 ASSAY OF FERRITIN: CPT | Performed by: NURSE PRACTITIONER

## 2020-03-18 PROCEDURE — 82607 VITAMIN B-12: CPT | Performed by: NURSE PRACTITIONER

## 2020-03-18 PROCEDURE — 84443 ASSAY THYROID STIM HORMONE: CPT | Performed by: NURSE PRACTITIONER

## 2020-03-18 PROCEDURE — 83735 ASSAY OF MAGNESIUM: CPT | Performed by: NURSE PRACTITIONER

## 2020-03-18 PROCEDURE — 82306 VITAMIN D 25 HYDROXY: CPT | Performed by: NURSE PRACTITIONER

## 2020-03-18 PROCEDURE — 80061 LIPID PANEL: CPT | Performed by: NURSE PRACTITIONER

## 2020-03-18 PROCEDURE — 99214 OFFICE O/P EST MOD 30 MIN: CPT | Mod: 25 | Performed by: NURSE PRACTITIONER

## 2020-03-18 RX ORDER — TOPIRAMATE 25 MG/1
75 TABLET, FILM COATED ORAL DAILY
Qty: 90 TABLET | Refills: 3 | Status: ON HOLD | OUTPATIENT
Start: 2020-03-18 | End: 2020-06-10

## 2020-03-18 RX ORDER — POTASSIUM CHLORIDE 1500 MG/1
20 TABLET, EXTENDED RELEASE ORAL 2 TIMES DAILY
Qty: 8 TABLET | Refills: 0 | Status: SHIPPED | OUTPATIENT
Start: 2020-03-18 | End: 2020-04-17

## 2020-03-18 RX ADMIN — MEDROXYPROGESTERONE ACETATE 150 MG: 150 INJECTION, SUSPENSION INTRAMUSCULAR at 11:51

## 2020-03-18 ASSESSMENT — MIFFLIN-ST. JEOR: SCORE: 1812.24

## 2020-03-18 NOTE — NURSING NOTE
BP: 126/79    LAST PAP/EXAM:   Lab Results   Component Value Date    PAP NIL 09/27/2019     URINE HCG:not indicated    The following medication was given:     MEDICATION: Depo Provera 150mg  ROUTE: IM  SITE: Deltoid - Right  NEXT INJECTION DUE: 6/3/20 - 6/17/20     María Crowe MA

## 2020-03-18 NOTE — PATIENT INSTRUCTIONS
At [facility], we strive to deliver an exceptional experience to you, every time we see you. If you receive a survey, please complete it as we do value your feedback.  If you have MyChart, you can expect to receive results automatically within 24 hours of their completion.  Your provider will send a note interpreting your results as well.   If you do not have MyChart, you should receive your results in about a week by mail.    Your care team:     Family Medicine   KATTY Dickinson APRN CNP S. Matthew Hockett, MD Pamela Kolacz, MD Angela Wermerskirchen, MD    Internal Medicine  Yang Clark MD     Clinic hours: Monday - Wednesday 7 am-7 pm   Thursdays and Fridays 7 am-5 pm.     [facility]: Monday - Friday 11 am-9 pm,   Saturday and Sunday 9 am-5 pm.     [facility]: Monday - Thursday 8 am - 7 pm; Friday 8 am - 6 pm    Clinic: [phone]   [facility]: [phone]     Use [email or web address] for 24/7 diagnosis and treatment of dozens of conditions.            Patient Education   Controlling Your Potassium Intake  For Patients with Kidney Disease  What is potassium?  Potassium is a mineral that helps your heart and muscles work properly. It is found in your body and in the foods you eat.  Healthy kidneys control the amount of potassium in your body. They remove any extra potassium that is not needed. If you have kidney disease, this extra potassium can build up in your blood, causing an irregular heartbeat (your heart does not beat the way it should).  Food choices can help lower your potassium and prevent stress on your heart and muscles.  Most doctors and dietitians will recommend that you try to eat less than 2000 to 3000 mg potassium a day. To stay in this range, you may eat one serving of high-potassium food. It may be better to choose low-potassium foods instead. (See the following charts.)  Salt  substitutes  Salt substitutes are sold in the grocery store. In these products, the salt (sodium) is often replaced with potassium salt.  You should avoid all salt substitutes unless your doctor says they are safe for you to eat. Examples include Maya Salt Substitute, No Salt, Salt Substitute, Cardia and Lite Salt  Potassium chloride is used to relpace salt in many packaged foods, such as in canned soups and tomato products. Check the ingredients list and limit foods that have potassium chloride.  High-potassium foods (over 200 mg per serving)--limit to one serving per day  Unless listed differently below, one serving is the same as a 1/2 cup, or one medium piece.  Vegetables    Rising Sun or butternut squash    Artichoke    Beans (except green or waxed)    Beets, cooked    Bok cheryl    Broccoli, cooked    [place] sprouts (1 cup)    Carrots, raw    Chard    Chili peppers    Lentils    Mushrooms, cooked    Potato, sweet potato or yam    Potato chips or french fries (10)    Pumpkin    Spinach and other greens, cooked    Split peas    Tomatoes and tomato products    Vegetable juice (1 cup or 8 ounces)  Fruits    Apricots, (2 medium fresh or 5 1/2 dried)    Avocado    Banana    Cantaloupe or honeydew (1 cup cubed)    Dates and figs (5 whole)    Dried fruit    Grapefruit juice    Kiwi (1 large)    Carroll    Nectarine, fresh    Orange, orange juice    Papaya ( 1/2 of a whole)    Pomegranate juice    Prunes or prune juice    Raisins, seedless  Low-potassium foods  Eating more than one serving can make a low-potassium food into a high-potassium food. Unless listed differently below, one serving is the same as a  1/2 cup, or one medium piece.  Vegetables    Asparagus (6 dean)    Bamboo shoots, canned    Beans, green or waxed    Beets, canned or pickled    Bell peppers    Broccoli, raw    Cabbage    Carrot, cooked    Cauliflower    Celery (1 stalk)    Corn, fresh    Cucumber    Eggplant    Kale    Lettuce, iceberg    Mixed  vegetables    Mushrooms, fresh    Onion    Peas    Peppers    Spinach, raw ( 1/2 cup)    Summer squash, cooked    Zucchini  Fruits    Apricots, canned   ( 1/2 cup, drained)    Apple (medium), applesauce,   apple juice    Blueberries, fresh    Cherries, fresh    Cranberries, fresh    Fruit cocktail, canned (1 cup, drained)    Grapefruit    Honeydew melon    Shahla    Limes    Peach (1 medium fresh or 1 cup halves, drained)    Pear (1 medium fresh or 1 cup halves, drained)    Pineapple (canned) or pineapple juice    Plums    Raspberries, fresh    Rhubarb    Strawberries, fresh    Tangerine    Watermelon (1 cup)  For informational purposes only. Not to replace the advice of your health care provider.  Copyright   2004 [facility]. All rights reserved. Clinically reviewed by [employer].  writewith [id number] - REV 02/19

## 2020-03-18 NOTE — PROGRESS NOTES
"Subjective     Hilaria Bonner is a 29 year old female who presents to clinic today for the following health issues:    HPI   Joint Pain    Onset: 4 days now     Description:   Location: Left ankle   Character: Sharp    Intensity: mild    Progression of Symptoms: better    Accompanying Signs & Symptoms:  Other symptoms: swelling    History:   Previous similar pain: YES- in the past with right ankle       Precipitating factors:   Trauma or overuse: no - \"possibly have stepped wrong\"    Alleviating factors:  Improved by: rest/inactivity    Therapies Tried and outcome: Tylenol and muscle relaxer. Elevation, ice, soaking. She voices ankle has gotten better now.     Has been working a lot. Did go see sports med last 3/14/2020. Was told she has flat feet and needs special inserts. Over the weekend she stepped wrong and got pain right away in the left inner ankle. Woke up middle of night and couldn't walk well. It was also quite swollen. When she moves her ankle it is  but more tolerable at this time. No calf pain.       Also, patient is due for Depo injection.   She would like lab work also needs to be done for gastric bypass.  Also due for repeat potassium. Denies muscle cramps    Is taking omperazole prn      Patient Active Problem List   Diagnosis     Morbid obesity (H)     Vitamin D deficiency     Elevated parathyroid hormone     Encounter for smoking cessation counseling     Menorrhagia with irregular cycle     Morbid (severe) obesity due to excess calories (H)     Pulmonary embolism with infarction (H)     Cardiac arrest, cause unspecified (H)     VT (ventricular tachycardia) (H)     Other pulmonary embolism with acute cor pulmonale, unspecified chronicity (H)     Secondary hyperparathyroidism, not elsewhere classified (H)     Past Surgical History:   Procedure Laterality Date     GYN SURGERY      2 C-sections     KNEE SURGERY  most recently - [id number]    3 surgeries in [place]     LAPAROSCOPIC GASTRIC " SLEEVE N/A 3/26/2019    Procedure: Laparoscopic Sleeve Gastrectomy;  Surgeon: Luan Lopez MD;  Location: [facility] OR     ORTHOPEDIC SURGERY      2 knee meniscus surgery       Social History     Tobacco Use     Smoking status: Former Smoker     Years: 1.00     Start date: 2016     Last attempt to quit: 2018     Years since quittin.1     Smokeless tobacco: Never Used   Substance Use Topics     Alcohol use: Not Currently     Alcohol/week: 0.0 standard drinks     Comment: occasional     Family History   Problem Relation Age of Onset     Diabetes Father      Hypertension Father      Breast Cancer Sister 26        surgery only     Cancer Sister      Coronary Artery Disease Other         paternal aunt     Thyroid Disease Other         neice     Coronary Artery Disease Other      Cerebrovascular Disease Other      Breast Cancer Sister      Thyroid Disease Other      Cerebrovascular Disease No family hx of          Current Outpatient Medications   Medication Sig Dispense Refill     Calcium Citrate-Vitamin D 500-500 MG-UNIT CHEW Take 1 chew tab by mouth 2 times daily 180 tablet 3     CHANTIX STARTING MONTH AMADO 0.5 MG X 11 & 1 MG X 42 tablet        fluticasone (FLONASE) 50 MCG/ACT nasal spray 2 sprays       folic acid (FOLVITE) 1 MG tablet        gabapentin (NEURONTIN) 300 MG capsule Take 1 capsule (300 mg) by mouth nightly as needed 90 capsule 1     magic mouthwash (FIRST-MOUTHWASH BLM) compounding kit Take 30 mLs by mouth every 6 hours as needed for mouth sores 900 mL 2     Multiple Vitamins-Minerals (MULTIVITAMIN ADULTS PO) Take 1 tablet by mouth daily       omeprazole (PRILOSEC) 20 MG DR capsule Take 1 capsule (20 mg) by mouth daily as needed (heartburn) 90 capsule 3     polyethylene glycol (MIRALAX/GLYCOLAX) powder Take 17 g (1 capful) by mouth daily 850 g 3     rivaroxaban ANTICOAGULANT (XARELTO ANTICOAGULANT) 20 MG TABS tablet Take 1 tablet (20 mg) by mouth daily (with dinner) 90 tablet 3      "tiZANidine (ZANAFLEX) 4 MG tablet Take 1 tablet (4 mg) by mouth nightly as needed for muscle spasms 20 tablet 0     topiramate (TOPAMAX) 25 MG tablet Take 3 tablets (75 mg) by mouth daily 90 tablet 3     varenicline (CHANTIX AMADO) 0.5 MG X 11 & 1 MG X 42 tablet Take 0.5 mg tab daily for 3 days, THEN 0.5 mg tab twice daily for 4 days, THEN 1 mg twice daily. 53 tablet 0     vitamin C (ASCORBIC ACID) 1000 MG TABS Take 1,000 mg by mouth       vitamin D3 (CHOLECALCIFEROL) 2000 units tablet Take 1 tablet by mouth daily 90 tablet 1     No Known Allergies      Reviewed and updated as needed this visit by Provider  Tobacco  Allergies  Meds  Problems  Med Hx  Surg Hx  Fam Hx         Review of Systems   ROS COMP: Constitutional, HEENT, cardiovascular, pulmonary, gi and gu systems are negative, except as otherwise noted.      Objective    /79 (BP Location: Right arm, Patient Position: Sitting, Cuff Size: Adult Large)   Pulse 105   Temp 98.8  F (37.1  C) (Oral)   Resp 18   Ht 1.676 m (5' 6\")   Wt 107 kg (236 lb)   LMP  (LMP Unknown)   SpO2 100%   BMI 38.09 kg/m    Body mass index is 38.09 kg/m .  Physical Exam   GENERAL: healthy, alert and no distress  RESP: lungs clear to auscultation - no rales, rhonchi or wheezes  CV: regular rate and rhythm, normal S1 S2, no S3 or S4, no murmur, click or rub  MS: no gross musculoskeletal defects noted, slight left inner ankle tenderness, slightly swollen, ROM slightly reduced  SKIN: no suspicious lesions or rashes    Diagnostic Test Results:  Labs reviewed in Epic  Results for orders placed or performed in visit on 03/18/20 (from the past 24 hour(s))   Hemoglobin A1c   Result Value Ref Range    Hemoglobin A1C 5.2 0 - 5.6 %           Assessment & Plan     1. Hypokalemia  Repeat labs: potassium was low and was given supplements. Gave patient information on eating more high potassium foods  - **Basic metabolic panel FUTURE anytime  - Magnesium  - TSH with free T4 " reflex    Addend:  Take k-dur 20meq po BID x 4 days. Repeat BMP in 2 weeks as long as free of cough/fever/sore throat/SOB    2. Pain in joint involving ankle and foot, left  Likely small sprain, treat with ice/heat, tylenol/ibuprofen. Should improve    3. S/P laparoscopic sleeve gastrectomy  Needs labs for gastric bypass follow up  - Hemoglobin A1c  - Ferritin  - Lipid panel reflex to direct LDL Fasting  - Vitamin D Deficiency  - Vitamin A  - Vitamin B12  - Parathyroid Hormone Intact    4. Morbid obesity (H)  Continue per gastric bypass providers  - topiramate (TOPAMAX) 25 MG tablet; Take 3 tablets (75 mg) by mouth daily  Dispense: 90 tablet; Refill: 3    5. Morbid (severe) obesity due to excess calories (H)  Refilled, using prn  - omeprazole (PRILOSEC) 20 MG DR capsule; Take 1 capsule (20 mg) by mouth daily as needed (heartburn)  Dispense: 90 capsule; Refill: 3    6. Secondary hyperparathyroidism, not elsewhere classified (H)  As above       Return in about 1 week (around 3/25/2020), or if symptoms worsen or fail to improve. advised to do phone or e-visit if she needs anything for the next 3 months. She she has to have labs done, we will do so as needed but also trying to reduce the risk of COVID-19 in [place].    RG Raygoza, NP-C  [facility]

## 2020-03-18 NOTE — PROGRESS NOTES
Patient opted to conduct today's return visit via telephone vs an in person visit to the clinic.    Criteria for telephone visit: (criteria may vary: due to COVID 19 we are conducting follow up visits via telephone for patients safety)  An E/M was not performed in the last 7 days  It is not related to a procedure within the global period  This will not be able to be billed if it results in an E/M visit in the next 24 hours.    I spoke with: Hilaria Bonner    The reason for the telephone visit was: Follow up bariatric surgery visit. 3/26/19 Dr Lopez     Complications of PE and cardiac arrest 6 weeks after surgery in [place], she was hospitalized for 2-3 weeks. She had been driving on a long car ride.  She had to do speech therapy after discharge  Recent labs with PCP and potassium 2.9 and prescribed potassium pills, she is going to recheck today.    Hx of Class 3 obesity  Height: 5'6  Initial weight: 315 lbs Date 1/24/18  Last visit weight: 271 Date 4/30/19  Current weight (reported home wt) 235 pounds      Pertinent history and review of systems:     Doing better since hospitalization for saddle PE last year  GERD: takes omeprazole 20mg prn  Restarted smoking 1 month ago and PCP prescribed Chantix  Taking vitamins and taking B12 injections  Pancreatitis Dec 2019 after drinking alcohol for her birthday. Admitted overnight.      Anti-obesity medications: topiramate 75mg    MyChart: Yes uses    Assessment: 1 year follow bariatric surgery.  She had complication of PE and cardiac arrest after a long car ride 6 weeks after surgery.  She is doing better now.  She has lost about 80 lbs from consult weight.  She is taking vitamins and meeting with RD on 3/20/20      Plan:    Advice/instructions given to patient including prescriptions, follow up appointment or orders for diagnostic testing:     Follow up: 1 year with labs  Labs: bariatric labs ordered today  Refills: Refills done by PCP. Can continue topiramate  75mg        Phone call contact time    Call Started at: 1000    Call Ended at: [phone]      [id number] 11-20 min of medical discussion        Joanne Sterling PA-C

## 2020-03-19 LAB — DEPRECATED CALCIDIOL+CALCIFEROL SERPL-MC: 8 UG/L (ref 20–75)

## 2020-03-20 ENCOUNTER — MYC MEDICAL ADVICE (OUTPATIENT)
Dept: FAMILY MEDICINE | Facility: CLINIC | Age: 30
End: 2020-03-20

## 2020-03-20 ENCOUNTER — VIRTUAL VISIT (OUTPATIENT)
Dept: SURGERY | Facility: CLINIC | Age: 30
End: 2020-03-20
Payer: COMMERCIAL

## 2020-03-20 DIAGNOSIS — E66.01 MORBID OBESITY (H): ICD-10-CM

## 2020-03-20 DIAGNOSIS — Z71.3 NUTRITIONAL COUNSELING: ICD-10-CM

## 2020-03-20 DIAGNOSIS — Z98.84 S/P LAPAROSCOPIC SLEEVE GASTRECTOMY: Primary | ICD-10-CM

## 2020-03-20 NOTE — PROGRESS NOTES
"Nutrition Assessment    TELEPHONE VISIT: COVID19  The patient has been notified of the following:   \"This telephone visit will be conducted via a call between you and your provider. We have found that certain health care needs can be provided without a physical exam. This evaluation will be a billable telephone visit .    Reason For Visit:  Hilaria Bonner is a 29 year old female, completed a virtual visit today for nutrition follow-up, 12 months s/p SG with Dr Lopez(3/26/19).  Patient referred by Joanne TOVAR.     Anthropometrics  Initial Consult Weight: 315.4 lbs  Day of Surgery Weight(3/26/19): 291.2 lbs  Current Weight:   Estimated body mass index is 38.09 kg/m  as calculated from the following:    Height as of 3/18/20: 1.676 m (5' 6\").    Weight as of 3/18/20: 107 kg (236 lb).  Weight loss: 79.4 lbs    Current Vitamins/Minerals: MVI/minerals (Flinstones complete with iron), 500 mg Calcium Citrate (once daily), Vitamin D (2000 international unit(s)), Vitamin B12 injection.    Prescribed potassium for hypokalemia by PCP, and advised to eat high potassium foods.     Labs (3/18/20): Vitamin D 8 (L); B12 420 (WNL); Ferritin 244 (H)     Nutrition History:    Can only take 2-3 bites at meals before getting full. Trying to eat 6 meals per day, but finds this to be difficult still to meet nutrition needs. Pt is not  fluid intake from meal/food intake and drinking too much fluid throughout the day (~128 oz/day), which is likely contributing to her fullness.    Difficulty tolerating red meats. Does not like yogurt and cottage cheese.     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 Too much walking hurts ankle. Ortho gave exercises to do at home.     Nutrition Prescription:  Grams Protein: 50-60 (minimum)  Amount of Fluid: 48-64 oz    MALNUTRITION  % Intake: Decreased intake does not meet criteria  % Weight Loss: Weight loss does not meet criteria  Subcutaneous Fat " Loss: Unable to assess  Muscle Loss: Unable to assess  Fluid Accumulation/Edema: Unable to assess  Malnutrition Diagnosis: Unable to determine due to virtual visit    Nutrition Diagnosis  Previous: Food and nutrition-related knowledge deficit r/t lack of prior exposure to diet advancements beyond bariatric pureed diet aeb pt unable to verbalize full understanding of bariatric soft and regular consistency diets. - resolved    Current: Food and nutrition-related knowledge deficit r/t limited prior exposure to maintenance diet guidelines after bariatric surgery aeb pt unable to verbalize full understanding of maintenance diet guidelines post bariatric surgery.     Intervention  Materials/Education provided.  Reviewed maintenance guidelines after bariatric surgery, portion sizes, eating pace, snacking, separation of beverages, and protein needs. Provided vitamin and mineral recommendations. Encouraged pt to reduce fluid intake by 50% and to focus on  fluid intake from food intake to optimize protein/nutrition intake at meals.Discussed importance of regular exercise for weight maintenance/continued loss. Sent goals to pt via My Chart.      Goals:  1) Continue bariatric regular diet.   2) Consume 60 grams of protein/day. Protein sources high in potassium:  - White Beans, Lima beans   - Fish (Cod, halibut, tuna)  - Unflavored soy milk  - Low fat dairy  3) Sip on 64 oz of fluids/day- between meals only.  4) Eat slowly (>20 min/meal), chewing foods well (to applesauce-like consistency).  5) Limit portions to 1/2-1 cup/meal. 75% of your meal should be a high protein food, 25% alternate between fruit/vegetable/whole grain. You can use protein shake for snack between meals to meet protein needs.    6) Take the following supplements:    Multivitamin/minerals: adult dose 2 times daily    Iron: 45-60 mg elemental (18-36 mg if low risk) - may partly or fully be covered in multivitamin     Calcium Citrate containing  vitamin D: 500 mg 3 times daily or 600 mg 2 times daily    Vitamin B12: sublingual form of at least 500 mcg daily or injection of 1000 mcg monthly     B-50 Complex once daily  7) Continue PT exercises daily at home    Follow-Up:  PRN    Phone call contact time:   Call Started at: 10:01  Call Ended at: 10:24      Time spent with patient: 23 minutes.  Jessie Linton RD, LD

## 2020-03-21 LAB
ANNOTATION COMMENT IMP: NORMAL
RETINYL PALMITATE SERPL-MCNC: <0.02 MG/L (ref 0–0.1)
VIT A SERPL-MCNC: 0.38 MG/L (ref 0.3–1.2)

## 2020-03-23 DIAGNOSIS — E55.9 VITAMIN D DEFICIENCY: Primary | ICD-10-CM

## 2020-04-14 ENCOUNTER — VIRTUAL VISIT (OUTPATIENT)
Dept: FAMILY MEDICINE | Facility: OTHER | Age: 30
End: 2020-04-14

## 2020-04-14 NOTE — PROGRESS NOTES
"Date: 2020 13:05:16  Clinician: Anne Angel  Clinician NPI: [id number]  Patient: Lenora Bonner  Patient : 1990  Patient Address: [address] [place] [address] [address], [place] [address]  Patient Phone: [phone]  Visit Protocol: URI  Patient Summary:  Lenora is a 29 year old ( : 1990 ) female who initiated a Visit for COVID-19 (Coronavirus) evaluation and screening. When asked the question \"Please sign me up to receive news, health information and promotions from [employer].\", Lenora responded \"Yes\".    Lenora states her symptoms started gradually 3-4 days ago.   Her symptoms consist of rhinitis, facial pain or pressure, myalgia, chills, diarrhea, vomiting, nausea, a headache, a sore throat, a cough, nasal congestion, malaise, and ear pain. She is experiencing mild difficulty breathing with activities but can speak normally in full sentences. Lenora also feels feverish but was unable to measure her temperature.   Symptom details     Nasal secretions: The color of her mucus is green, yellow, and white.    Cough: Lenora coughs every 5-10 minutes and her cough is more bothersome at night. Phlegm comes into her throat when she coughs. She believes her cough is caused by post-nasal drip. The color of the phlegm is yellow, white, and green.     Sore throat: Lenora reports having moderate throat pain (4-6 on a 10 point pain scale), has exudate on her tonsils, and can swallow liquids. She is not sure if the lymph nodes in her neck are enlarged. A rash has not appeared on the skin since the sore throat started.     Facial pain or pressure: The facial pain or pressure feels worse when bending over or leaning forward.     Headache: She states the headache is severe (7-9 on a 10 point pain scale).      Lenora denies having teeth pain and wheezing. She also denies taking antibiotic medication for the symptoms, having recent facial or sinus surgery in the past 60 days, having a " sinus infection within the past year, and double sickening (worsening symptoms after initial improvement).   Precipitating events  Lenora is not sure if she has been exposed to someone with strep throat. She has not recently been exposed to someone with influenza. Lenora has been in close contact with the following high risk individuals: children under the age of 5.   Pertinent COVID-19 (Coronavirus) information  Lenora has not traveled internationally or to the areas where COVID-19 (Coronavirus) is widespread, including cruise ship travel in the last 14 days before the start of her symptoms.   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 She provides direct patient care. 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   Lenora has not had a close contact with a laboratory-confirmed COVID-19 patient within 14 days of symptom onset. She also has not had a close contact with a suspected COVID-19 patient within 14 days of symptom onset.   Pertinent medical history  Lenora does not get yeast infections when she takes antibiotics.   Lenora needs a return to work/school note.   Weight: 235 lbs   Lenora smokes or uses smokeless tobacco.   She denies pregnancy and denies breastfeeding. She does not menstruate.   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

## 2020-04-15 ENCOUNTER — TRANSFERRED RECORDS (OUTPATIENT)
Dept: HEALTH INFORMATION MANAGEMENT | Facility: CLINIC | Age: 30
End: 2020-04-15

## 2020-04-17 ENCOUNTER — TELEPHONE (OUTPATIENT)
Dept: FAMILY MEDICINE | Facility: CLINIC | Age: 30
End: 2020-04-17

## 2020-04-17 ENCOUNTER — VIRTUAL VISIT (OUTPATIENT)
Dept: FAMILY MEDICINE | Facility: CLINIC | Age: 30
End: 2020-04-17
Payer: COMMERCIAL

## 2020-04-17 DIAGNOSIS — R05.9 COUGH: Primary | ICD-10-CM

## 2020-04-17 DIAGNOSIS — Z20.822 EXPOSURE TO COVID-19 VIRUS: ICD-10-CM

## 2020-04-17 DIAGNOSIS — R06.02 SHORTNESS OF BREATH: ICD-10-CM

## 2020-04-17 PROCEDURE — 99207 ZZC NO BILLABLE SERVICE THIS VISIT: CPT | Performed by: PHYSICIAN ASSISTANT

## 2020-04-17 NOTE — PROGRESS NOTES
"Hilaria Bonner is a 29 year old female who is being evaluated via a billable telephone visit.      The patient has been notified of following:     \"This telephone visit will be conducted via a call between you and your physician/provider. We have found that certain health care needs can be provided without the need for a physical exam.  This service lets us provide the care you need with a short phone conversation.  If a prescription is necessary we can send it directly to your pharmacy.  If lab work is needed we can place an order for that and you can then stop by our lab to have the test done at a later time.    Telephone visits are billed at different rates depending on your insurance coverage. During this emergency period, for some insurers they may be billed the same as an in-person visit.  Please reach out to your insurance provider with any questions.    If during the course of the call the physician/provider feels a telephone visit is not appropriate, you will not be charged for this service.\"    Patient has given verbal consent for Telephone visit?  Yes    How would you like to obtain your AVS? 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 Complains of Bad headache.  Shortness of breath with standing up for 5-10 minutes.   Cough productive with glob of snot yellow or white.   No thermometer but body gets real hot.  Nauseated and vomiting and diarrhea.  Woke up this am with sore throat.  No taste and no appetite.  Am drinking powerade and water-not throwing up.  Was advised that Results of covid 19 can take 2-10 days  6 days with symptoms   Have been away from kids and in my room.  Head Feels like " going to explode- tylenol makes me sick to stomach.  Tried some theraflu.  Tried hot and warm and cold packs.  Has humidifier in my bedroom. Wears mask outside of room.  Gets lightheaded.    Taking gabapentin throughout the day as well without improvement   Home from work since  5- 12 days   Symptoms worsening   History of massive PE and cardiac arrest in past- on anticoagulants               Patient Active Problem List   Diagnosis     Morbid obesity (H)     Vitamin D deficiency     Elevated parathyroid hormone     Encounter for smoking cessation counseling     Menorrhagia with irregular cycle     Morbid (severe) obesity due to excess calories (H)     Pulmonary embolism with infarction (H)     Cardiac arrest, cause unspecified (H)     VT (ventricular tachycardia) (H)     Other pulmonary embolism with acute cor pulmonale, unspecified chronicity (H)     Secondary hyperparathyroidism, not elsewhere classified (H)     Past Surgical History:   Procedure Laterality Date     GYN SURGERY      2 C-sections     KNEE SURGERY  most recently - [id number]    3 surgeries in [place]     LAPAROSCOPIC GASTRIC SLEEVE N/A 3/26/2019    Procedure: Laparoscopic Sleeve Gastrectomy;  Surgeon: Luan Lopez MD;  Location: [facility] OR     ORTHOPEDIC SURGERY      2 knee meniscus surgery       Social History     Tobacco Use     Smoking status: Former Smoker     Years: 1.00     Start date: 2016     Last attempt to quit: 2018     Years since quittin.2     Smokeless tobacco: Never Used   Substance Use Topics     Alcohol use: Not Currently     Alcohol/week: 0.0 standard drinks     Comment: occasional     Family History   Problem Relation Age of Onset     Diabetes Father      Hypertension Father      Breast Cancer Sister 26        surgery only     Cancer Sister      Coronary Artery Disease Other         paternal aunt     Thyroid Disease Other         neice     Coronary Artery Disease Other      Cerebrovascular Disease Other       Breast Cancer Sister      Thyroid Disease Other      Cerebrovascular Disease No family hx of          Current Outpatient Medications   Medication Sig Dispense Refill     Calcium Citrate-Vitamin D 500-500 MG-UNIT CHEW Take 1 chew tab by mouth 2 times daily 180 tablet 3     CHANTIX STARTING MONTH AMADO 0.5 MG X 11 & 1 MG X 42 tablet        fluticasone (FLONASE) 50 MCG/ACT nasal spray 2 sprays       folic acid (FOLVITE) 1 MG tablet        gabapentin (NEURONTIN) 300 MG capsule Take 1 capsule (300 mg) by mouth nightly as needed 90 capsule 1     magic mouthwash (FIRST-MOUTHWASH BLM) compounding kit Take 30 mLs by mouth every 6 hours as needed for mouth sores 900 mL 2     Multiple Vitamins-Minerals (MULTIVITAMIN ADULTS PO) Take 1 tablet by mouth daily       omeprazole (PRILOSEC) 20 MG DR capsule Take 1 capsule (20 mg) by mouth daily as needed (heartburn) 90 capsule 3     polyethylene glycol (MIRALAX/GLYCOLAX) powder Take 17 g (1 capful) by mouth daily 850 g 3     rivaroxaban ANTICOAGULANT (XARELTO ANTICOAGULANT) 20 MG TABS tablet Take 1 tablet (20 mg) by mouth daily (with dinner) 90 tablet 3     tiZANidine (ZANAFLEX) 4 MG tablet Take 1 tablet (4 mg) by mouth nightly as needed for muscle spasms 20 tablet 0     topiramate (TOPAMAX) 25 MG tablet Take 3 tablets (75 mg) by mouth daily 90 tablet 3     varenicline (CHANTIX AMADO) 0.5 MG X 11 & 1 MG X 42 tablet Take 0.5 mg tab daily for 3 days, THEN 0.5 mg tab twice daily for 4 days, THEN 1 mg twice daily. 53 tablet 0     vitamin C (ASCORBIC ACID) 1000 MG TABS Take 1,000 mg by mouth       vitamin D3 (CHOLECALCIFEROL) 61623 units (250 mcg) capsule Take 1 capsule (10,000 Units) by mouth daily 30 capsule 1     vitamin D3 (CHOLECALCIFEROL) 2000 units tablet Take 1 tablet by mouth daily 90 tablet 1     BP Readings from Last 3 Encounters:   03/18/20 126/79   03/09/20 133/87   01/31/20 (!) 153/112    Wt Readings from Last 3 Encounters:   03/18/20 107 kg (236 lb)   03/09/20 106.8 kg (235  lb 8 oz)   01/31/20 112.5 kg (248 lb)                    Reviewed and updated as needed this visit by Provider         Review of Systems   ROS COMP: Constitutional, HEENT, cardiovascular, pulmonary, gi and gu systems are negative, except as otherwise noted.       Objective   Reported vitals:  There were no vitals taken for this visit.   alert and mild distress  PSYCH: Alert and oriented times 3; coherent speech, normal   rate and volume, able to articulate logical thoughts, able   to abstract reason, no tangential thoughts, no hallucinations   or delusions  Her affect is tearful, sad and fearful  RESP: No cough, no audible wheezing, she is unable to talk in complete sentences   Remainder of exam unable to be completed due to telephone visits    Diagnostic Test Results:  none         Assessment/Plan:  1. Cough  Appears to have some shortness of breath over the phone and also can't stand for 5-10 minutes without significant shortness of breath - needs to be evaluated for oxygen saturation - also headache and nausea - will be seen in clinic later today     2. Shortness of breath  As above     3. Exposure to Covid-19 Virus  As above       No follow-ups on file.  Due to covid-19 pandemic we are conducting virtual visit- but needs to be seen so no charge for telephone visit today     Phone call duration:  12 minutes    Crissy Madrigal PA-C

## 2020-04-17 NOTE — TELEPHONE ENCOUNTER
S-(situation): patient experiencing COVID-19 symptoms    B-(background): patient had oncare visit on 4/14/20 then went to [facility] for COVID-19 testing but has not received results back yet    A-(assessment): headache for past 3 days, feverish but no thermometer at home, vomiting, nauseated,SOB, light headed and weak, no appetite, when she eats she vomits, no taste, [facility] tested for COVID-19 Wednesday. Head feels like it is going to explode, diarrhea, cough, vomiting yellow mucous, feels like she has worsened cannot sleep. 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    Negative for following symptoms: inability to talk in full sentences, sever SOB        R-(recommendations): use vaporizer, cool compresses on forehead, eat small bland meals such as BRAT diet, take sips of fluid often, phone apt today since worsened since UC visit on Wednesday. Patient is agreeable and assisted her in scheduling phone apt today.    Sophia Zamora RN

## 2020-04-17 NOTE — TELEPHONE ENCOUNTER
Reason for Call:  Severe headache    Detailed comments: patient is calling because she has has a severe headache for the last 3 days and has been doing everything     Phone Number Patient can be reached at: Other phone number:  [phone]    Best Time: any    Can we leave a detailed message on this number? YES    Call taken on 4/17/2020 at 8:45 AM by Trent Lechuga

## 2020-04-17 NOTE — TELEPHONE ENCOUNTER
She'll be going to the [facility] not sure if you wanted to call ahead.    Yessy Fabian MA on 4/17/2020 at 12:46 PM

## 2020-04-17 NOTE — TELEPHONE ENCOUNTER
Spoke with MD at [facility] and notified patient will be arriving by private car

## 2020-04-17 NOTE — TELEPHONE ENCOUNTER
Message to team- may need emergency department given shortness of breath- will let Dr. Welsh decide

## 2020-05-02 NOTE — PROGRESS NOTES
ANTICOAGULATION FOLLOW-UP CLINIC VISIT    Patient Name:  Hilaria Bonner  Date:  2019  Contact Type:  Telephone    SUBJECTIVE:         OBJECTIVE    INR   Date Value Ref Range Status   2019 1.32 (H) 0.86 - 1.14 Final       ASSESSMENT / PLAN  INR assessment SUB    INR Location Clinic lab     Anticoagulation Summary  As of 2019    INR goal:   2.0-3.0   TTR:   0.0 % (2.6 wk)   INR used for dosin.32! (2019)   Warfarin maintenance plan:   No maintenance plan   Full warfarin instructions:   : 10 mg; : 8 mg; : 8 mg; : 4 mg; Otherwise No maintenance plan   Next INR check:   2019   Target end date:            Anticoagulation Episode Summary     INR check location:   Anticoagulation Clinic    Preferred lab:       Send INR reminders to:   BELIA BRADEN    Comments:         Anticoagulation Care Providers     Provider Role Specialty Phone number    Crissy Madrigal PA-C Referring [facility] [phone]            See the Encounter Report to view Anticoagulation Flowsheet and Dosing Calendar (Go to Encounters tab in chart review, and find the Anticoagulation Therapy Visit)    Dosage adjustment made based on physician directed care plan. Increased dosage by 15% as patient is still bridging and subtherapeutic. Reordered Lovenox injections for patient as she will need to continue bridging. Will recheck on 19 via home care nurse; patient states that home care nurse will be out that day so they will do INR and call to report to INR clinic.       Ayana Tay RN, BSN, PHN                    Home

## 2020-05-04 ENCOUNTER — TELEPHONE (OUTPATIENT)
Dept: FAMILY MEDICINE | Facility: CLINIC | Age: 30
End: 2020-05-04

## 2020-05-04 NOTE — TELEPHONE ENCOUNTER
Reason for Call:  Other Note    Detailed comments: Pt states that she tested positive for Covid 19. She is currently quarantined and is still experiencing symptoms. She is hoping to obtain a letter from PCP to excuse her from work. Please call back to advise or confirm. She would like it sent via My Chart is provider agrees to write. Thank you.    Phone Number Patient can be reached at: Cell number on file:    Telephone Information:   Mobile [phone]       Best Time: Any    Can we leave a detailed message on this number? YES    Call taken on 5/4/2020 at 9:33 AM by Laura Wang

## 2020-05-04 NOTE — LETTER
May 4, 2020      Hilaria Bonner  [address]  [address]  [place] [address]        To Whom It May Concern,     Please excuse Hilaria from work due to covid-19.  She needs to remain off of work until 3 days without symptoms.       Sincerely,        Crissy Madrigal PA-C

## 2020-05-12 ENCOUNTER — PATIENT OUTREACH (OUTPATIENT)
Dept: CARE COORDINATION | Facility: CLINIC | Age: 30
End: 2020-05-12

## 2020-05-12 DIAGNOSIS — U07.1 COVID-19: Primary | ICD-10-CM

## 2020-05-12 NOTE — LETTER
[place] CARE COORDINATION  [address]  [place] [address]    May 12, 2020    Hilaria Bonner  [address]  [address]  [place] [address]      Dear Hilaria,    I am a clinic care coordinator who works with Crissy Madrigal PA-C at [facility]. I recently tried to call and was unable to reach you. Below is a description of clinic care coordination and how I can further assist you.      The clinic care coordination team is made up of a registered nurse,  and community health worker who understand the health care system. The goal of clinic care coordination is to help you manage your health and improve access to the health care system in the most efficient manner. The team can assist you in meeting your health care goals by providing education, coordinating services, strengthening the communication among your providers and supporting you with any resource needs.    Please feel free to contact me at [phone] with any questions or concerns. We are focused on providing you with the highest-quality healthcare experience possible and that all starts with you.     Sincerely,     Monica Hernandez RN, [facility] - Primary Care Clinic RN Coordinator  [facility]     [phone]    Instructions for Patients  Your symptoms show that you may have coronavirus (COVID-19). This illness can cause fever, cough and trouble breathing. Many people get a mild case and get better on their own. Some people can get very sick.     Not all patients are tested for COVID-19. If you need to be tested, your care team will let you know.    How can I protect others?    Without a test, we can t know for sure that you have COVID-19. For safety, it s very important to follow these rules.    Stay home and away from others (self-isolate) until:    You ve had no fever--and no medicine that reduces fever--for 3 full days (72 hours). And      Your other symptoms have  resolved (gotten better). For example, your cough or breathing has improved. And     At least 10 days have passed since your symptoms started.    During this time:    Stay in your own room (and use your own bathroom), if you can.    Stay away from others in your home. No hugging, kissing or shaking hands.    No visitors.    Don t go to work, school or anywhere else.     Clean  high touch  surfaces often (doorknobs, counters, handles, etc.). Use a household cleaning spray or wipes.    Cover your mouth and nose with a mask, tissue or washcloth to avoid spreading germs.    Wash your hands and face often. Use soap and water.    For more tips, go to https://www.cdc.gov/coronavirus/2019-ncov/downloads/10Things.pdf.    How can I take care of myself?    1. Get lots of rest. Drink extra fluids (unless a doctor has told you not to).     2. Take Tylenol (acetaminophen) for fever or pain. If you have liver or kidney problems, ask your family doctor if it's okay to take Tylenol.     Adults can take either:     650 mg (two 325 mg pills) every 4 to 6 hours, or     1,000 mg (two 500 mg pills) every 8 hours as needed.     Note: Don't take more than 3,000 mg in one day.   Acetaminophen is found in many medicines (both prescribed and over-the-counter medicines). Read all labels to be sure you don't take too much.   For children, check the Tylenol bottle for the right dose. The dose is based on  the child's age or weight.    3. If you have other health problems (like cancer, heart failure, an organ transplant or severe kidney disease): Call your specialty clinic if you don't feel better in the next 2 days.    4. Know when to call 911: If your breathing is so bad that it keeps you from doing normal activities, call 911 or go to the emergency room. Tell them that you've been staying home and may have COVID-19.      Get Well Loop Information  We know it can be scary to hear that you might have COVID-19. Our team can help track your  symptoms and make sure you are doing ok over the next two weeks using a program called [employer] to keep in touch. When you receive an email from [employer], please consider enrolling in our monitoring program. There is no cost to you for monitoring. Here is a URL where you can learn more: [email or web address]      Thank you for taking steps to prevent the spread of this virus.  o Limit your contact with others.  o Wear a simple mask to cover your cough.  o Wash your hands well and often.  o If you need medical care, go to [email or web address] or contact your health care provider.     For more about COVID-19 and caring for yourself at home, visit the CDC website at https://www.cdc.gov/coronavirus/2019-ncov/about/steps-when-sick.html.     To learn about care at [facility], please go to [email or web address].     Below are the COVID-19 hotlines at the [place] Department of Health ([facility]). Interpreters are available.     For health questions: Call 322-201-4737 or 1-100.900.2916 (7 a.m. to 7 p.m.)    For questions about schools and childcare: Call 516-025-6329 or 1-528.668.9526 (7 a.m. to 7 p.m.)

## 2020-05-12 NOTE — PROGRESS NOTES
Clinic Care Coordination Contact  [facility]/Voicemail    Referral Source: [facility] ED 5/11/2020    Clinical Data: Care Coordinator Outreach    Outreach attempted.  Left message on patient's voicemail with call back information and requested return call.    Patient tested positive for covid-19 around 4/18/20. Had symptoms in early April after positive exposure. 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     Patient presented to ED on 5/11/20 with complaints of chest pain, shortness of breath and tingling in hands and feet. Patient also has history of PE and is on Xarelto.    O2 sats were 100%, ECG normal, D dimmer normal, labs normal - potassium a bit low so was given dose in ED. Chest x-ray normal. Patient was instructed to monitor symptoms and return to ED if worsening.        Plan: Care Coordinator will send care coordination introduction letter with care coordinator contact information and explanation of care coordination services via [employer]. Care Coordinator will do no further outreaches at this time. From previous interactions, patient does not return calls.      Monica Hernandez RN, [facility] - Primary Care Clinic RN Coordinator  [facility]   5/12/2020    9:36 AM  [phone]

## 2020-05-13 DIAGNOSIS — Z98.84 S/P LAPAROSCOPIC SLEEVE GASTRECTOMY: ICD-10-CM

## 2020-05-13 NOTE — PROGRESS NOTES
Clinic Care Coordination Contact  [facility]/Voicemail    Referral Source: [facility]  RN CC covering for assigned Care Coordinator.  Patient left a voicemail on assigned Care Coordinator's voicemail at 9:48 am.  Clinical Data: Care Coordinator Outreach  Outreach attempted x 1.  Left message on patient's voicemail with call back information and requested return call.  Plan:Care Coordinator will hand back to assigned RN CC for further attempts.    Melissa Behl BSN, RN, PHN, CCM  Primary Care Clinical RN Care Coordinator     [phone]

## 2020-05-14 ENCOUNTER — PATIENT OUTREACH (OUTPATIENT)
Dept: CARE COORDINATION | Facility: CLINIC | Age: 30
End: 2020-05-14

## 2020-05-14 DIAGNOSIS — E87.6 HYPOKALEMIA: ICD-10-CM

## 2020-05-14 DIAGNOSIS — R51.9 ACUTE INTRACTABLE HEADACHE, UNSPECIFIED HEADACHE TYPE: ICD-10-CM

## 2020-05-14 RX ORDER — URSODIOL 300 MG/1
CAPSULE ORAL
Qty: 60 CAPSULE | Refills: 4 | OUTPATIENT
Start: 2020-05-14

## 2020-05-14 NOTE — TELEPHONE ENCOUNTER
"Requested Prescriptions   Pending Prescriptions Disp Refills     gabapentin (NEURONTIN) 300 MG capsule 90 capsule 1     Sig: Take 1 capsule (300 mg) by mouth nightly as needed       There is no refill protocol information for this order        potassium chloride ER (KLOR-CON M) 20 MEQ CR tablet 8 tablet 0     Sig: Take 1 tablet (20 mEq) by mouth 2 times daily       Potassium Supplements Protocol Failed - 5/14/2020 10:10 AM        Failed - Medication is active on med list        Failed - Normal serum potassium in past 12 months     Recent Labs   Lab Test 03/18/20  1154   POTASSIUM 3.1*                    Passed - Recent (12 mo) or future (30 days) visit within the authorizing provider's department     Patient has had an office visit with the authorizing provider or a provider within the authorizing providers department within the previous 12 mos or has a future within next 30 days. See \"Patient Info\" tab in inbasket, or \"Choose Columns\" in Meds & Orders section of the refill encounter.              Passed - Patient is age 18 or older             gabapentin (NEURONTIN) 300 MG capsule      Last Written Prescription Date:  9/25/19  Last Fill Quantity: 90,   # refills: 1  Last Office Visit: 3/18/2020  Future Office visit:       Routing refill request to provider for review/approval because:  Drug not on the [facility] or  Health refill protocol or controlled substance    potassium chloride ER (KLOR-CON M) 20 MEQ CR tablet      Last Written Prescription Date:  3/18/2020  Last Fill Quantity: 8,   # refills: 0  Last Office Visit: 3/18/2020  Future Office visit:       Routing refill request to provider for review/approval because:  Drug not active on patient's medication list    "

## 2020-05-14 NOTE — PROGRESS NOTES
Clinic Care Coordination Contact  [facility]/Voicemail       Clinical Data: Care Coordinator Outreach    Outreach attempted x 3.  Left message on patient's voicemail with call back information and requested return call.    Plan: Letter sent previously. Care Coordinator will do no further outreaches at this time. Remain available to take patient calls if returned.     Monica Hernandez RN, [facility] - Primary Care Clinic RN Coordinator  [facility]   5/14/2020    9:51 AM  [phone]

## 2020-05-18 NOTE — TELEPHONE ENCOUNTER
Routing refill request to provider for review/approval because: Gabapentin  Drug not on the [facility] refill protocol     Routing refill request to provider for review/approval because: Potassium chloride  Drug not active on patient's medication list    Sophia Zamora RN

## 2020-05-19 ENCOUNTER — ALLIED HEALTH/NURSE VISIT (OUTPATIENT)
Dept: NURSING | Facility: CLINIC | Age: 30
End: 2020-05-19
Payer: COMMERCIAL

## 2020-05-19 ENCOUNTER — VIRTUAL VISIT (OUTPATIENT)
Dept: FAMILY MEDICINE | Facility: CLINIC | Age: 30
End: 2020-05-19
Payer: COMMERCIAL

## 2020-05-19 DIAGNOSIS — D64.9 ANEMIA, UNSPECIFIED TYPE: ICD-10-CM

## 2020-05-19 DIAGNOSIS — E53.8 VITAMIN B12 DEFICIENCY (NON ANEMIC): Primary | ICD-10-CM

## 2020-05-19 DIAGNOSIS — R20.0 NUMBNESS AND TINGLING: ICD-10-CM

## 2020-05-19 DIAGNOSIS — R20.2 NUMBNESS AND TINGLING: ICD-10-CM

## 2020-05-19 DIAGNOSIS — R20.0 NUMBNESS AND TINGLING: Primary | ICD-10-CM

## 2020-05-19 DIAGNOSIS — R20.2 NUMBNESS AND TINGLING: Primary | ICD-10-CM

## 2020-05-19 DIAGNOSIS — E53.8 VITAMIN B12 DEFICIENCY (NON ANEMIC): ICD-10-CM

## 2020-05-19 DIAGNOSIS — E87.6 HYPOKALEMIA: ICD-10-CM

## 2020-05-19 LAB
ALBUMIN SERPL-MCNC: 2.8 G/DL (ref 3.4–5)
ALP SERPL-CCNC: 135 U/L (ref 40–150)
ALT SERPL W P-5'-P-CCNC: 28 U/L (ref 0–50)
ANION GAP SERPL CALCULATED.3IONS-SCNC: 10 MMOL/L (ref 3–14)
AST SERPL W P-5'-P-CCNC: 42 U/L (ref 0–45)
BASOPHILS # BLD AUTO: 0 10E9/L (ref 0–0.2)
BASOPHILS NFR BLD AUTO: 0.4 %
BILIRUB SERPL-MCNC: 0.5 MG/DL (ref 0.2–1.3)
BUN SERPL-MCNC: 7 MG/DL (ref 7–30)
CALCIUM SERPL-MCNC: 8.6 MG/DL (ref 8.5–10.1)
CHLORIDE SERPL-SCNC: 105 MMOL/L (ref 94–109)
CO2 SERPL-SCNC: 23 MMOL/L (ref 20–32)
CREAT SERPL-MCNC: 0.58 MG/DL (ref 0.52–1.04)
DIFFERENTIAL METHOD BLD: ABNORMAL
EOSINOPHIL # BLD AUTO: 0 10E9/L (ref 0–0.7)
EOSINOPHIL NFR BLD AUTO: 0.4 %
ERYTHROCYTE [DISTWIDTH] IN BLOOD BY AUTOMATED COUNT: 19.2 % (ref 10–15)
FERRITIN SERPL-MCNC: 206 NG/ML (ref 12–150)
GFR SERPL CREATININE-BSD FRML MDRD: >90 ML/MIN/{1.73_M2}
GLUCOSE SERPL-MCNC: 183 MG/DL (ref 70–99)
HCT VFR BLD AUTO: 30.3 % (ref 35–47)
HGB BLD-MCNC: 10.2 G/DL (ref 11.7–15.7)
IRON SATN MFR SERPL: 96 % (ref 15–46)
IRON SERPL-MCNC: 187 UG/DL (ref 35–180)
LYMPHOCYTES # BLD AUTO: 2.2 10E9/L (ref 0.8–5.3)
LYMPHOCYTES NFR BLD AUTO: 29.2 %
MCH RBC QN AUTO: 37.8 PG (ref 26.5–33)
MCHC RBC AUTO-ENTMCNC: 33.7 G/DL (ref 31.5–36.5)
MCV RBC AUTO: 112 FL (ref 78–100)
MONOCYTES # BLD AUTO: 0.3 10E9/L (ref 0–1.3)
MONOCYTES NFR BLD AUTO: 3.7 %
NEUTROPHILS # BLD AUTO: 4.9 10E9/L (ref 1.6–8.3)
NEUTROPHILS NFR BLD AUTO: 66.3 %
PLATELET # BLD AUTO: 464 10E9/L (ref 150–450)
POTASSIUM SERPL-SCNC: 3.4 MMOL/L (ref 3.4–5.3)
PROT SERPL-MCNC: 6.2 G/DL (ref 6.8–8.8)
RBC # BLD AUTO: 2.7 10E12/L (ref 3.8–5.2)
SODIUM SERPL-SCNC: 138 MMOL/L (ref 133–144)
TIBC SERPL-MCNC: 195 UG/DL (ref 240–430)
VIT B12 SERPL-MCNC: 285 PG/ML (ref 193–986)
WBC # BLD AUTO: 7.4 10E9/L (ref 4–11)

## 2020-05-19 PROCEDURE — 80053 COMPREHEN METABOLIC PANEL: CPT | Performed by: PHYSICIAN ASSISTANT

## 2020-05-19 PROCEDURE — 99207 ZZC NO CHARGE NURSE ONLY: CPT

## 2020-05-19 PROCEDURE — 36415 COLL VENOUS BLD VENIPUNCTURE: CPT | Performed by: PHYSICIAN ASSISTANT

## 2020-05-19 PROCEDURE — 83550 IRON BINDING TEST: CPT | Performed by: PHYSICIAN ASSISTANT

## 2020-05-19 PROCEDURE — 82728 ASSAY OF FERRITIN: CPT | Performed by: PHYSICIAN ASSISTANT

## 2020-05-19 PROCEDURE — 82607 VITAMIN B-12: CPT | Performed by: PHYSICIAN ASSISTANT

## 2020-05-19 PROCEDURE — 83540 ASSAY OF IRON: CPT | Performed by: PHYSICIAN ASSISTANT

## 2020-05-19 PROCEDURE — 85025 COMPLETE CBC W/AUTO DIFF WBC: CPT | Performed by: PHYSICIAN ASSISTANT

## 2020-05-19 PROCEDURE — 99214 OFFICE O/P EST MOD 30 MIN: CPT | Mod: 95 | Performed by: PHYSICIAN ASSISTANT

## 2020-05-19 RX ORDER — GABAPENTIN 100 MG/1
100 CAPSULE ORAL 2 TIMES DAILY
Qty: 60 CAPSULE | Refills: 1 | Status: ON HOLD | OUTPATIENT
Start: 2020-05-19 | End: 2020-06-05

## 2020-05-19 RX ORDER — GABAPENTIN 300 MG/1
300 CAPSULE ORAL
Qty: 90 CAPSULE | Refills: 1 | Status: ON HOLD | OUTPATIENT
Start: 2020-05-19 | End: 2020-06-10

## 2020-05-19 RX ORDER — POTASSIUM CHLORIDE 750 MG/1
10 TABLET, EXTENDED RELEASE ORAL 2 TIMES DAILY
COMMUNITY
Start: 2020-05-19 | End: 2020-05-27

## 2020-05-19 RX ORDER — CYANOCOBALAMIN 1000 UG/ML
1000 INJECTION, SOLUTION INTRAMUSCULAR; SUBCUTANEOUS
Status: DISCONTINUED | OUTPATIENT
Start: 2020-05-19 | End: 2020-05-19

## 2020-05-19 RX ORDER — POTASSIUM CHLORIDE 1500 MG/1
20 TABLET, EXTENDED RELEASE ORAL 2 TIMES DAILY
Qty: 8 TABLET | Refills: 0 | OUTPATIENT
Start: 2020-05-19

## 2020-05-19 RX ORDER — CYANOCOBALAMIN 1000 UG/ML
1000 INJECTION, SOLUTION INTRAMUSCULAR; SUBCUTANEOUS
Status: DISCONTINUED | OUTPATIENT
Start: 2020-05-20 | End: 2020-09-30

## 2020-05-19 NOTE — LETTER
May 19, 2020      Hilaria Bonner  [address]  [address]  [place] [address]        To Whom It May Concern,     Please excuse Hilaria from work until 5/27/2020 due to illness.  She was evaluated through the clinic.       Sincerely,        Crissy Madrigal PA-C

## 2020-05-19 NOTE — PATIENT INSTRUCTIONS
Schedule and Come in for labs and B12 injection curbside at the [facility].  Follow up with me in clinic next week.  Return urgently if any change in symptoms like increasing pain, shortness of breath, fever or other change in symptoms    Take gabapentin 100 mg twice a day in the day and 300 mg at bedtime.     At [facility], we strive to deliver an exceptional experience to you, every time we see you. If you receive a survey, please complete it as we do value your feedback.  If you have MyChart, you can expect to receive results automatically within 24 hours of their completion.  Your provider will send a note interpreting your results as well.   If you do not have MyChart, you should receive your results in about a week by mail.    Your care team:     Family Medicine   KATTY Dickinson APRN CNP S. Matthew Hockett, MD Pamela Kolacz, MD Angela Wermerskirchen, MD    Internal Medicine  Yang Clark MD     Clinic hours: Monday - Wednesday 7 am-7 pm   Thursdays and Fridays 7 am-5 pm.     [facility]: Monday - Friday 11 am-9 pm,   Saturday and Sunday 9 am-5 pm.     [facility]: Monday - Thursday 8 am - 7 pm; Friday 8 am - 6 pm    Clinic: [phone]   [facility]: [phone]     Use www.[email or web address] for 24/7 diagnosis and treatment of dozens of conditions.

## 2020-05-19 NOTE — TELEPHONE ENCOUNTER
Patient has appointment with me today- will need basic metabolic panel- and adjust potassium dose if needed

## 2020-05-19 NOTE — NURSING NOTE
The following medication was given:     MEDICATION: Vitamin B12  1000mcg  ROUTE: IM  SITE: Deltoid - Right  DOSE: 1.0  LOT #: [id number]  :  [employer]  EXPIRATION DATE:  02/21  NDC#: 1678-7565-91    María Crowe MA

## 2020-05-19 NOTE — PROGRESS NOTES
"Hilaria Bonner is a 29 year old female who is being evaluated via a billable telephone visit.      The patient has been notified of following:     \"This telephone visit will be conducted via a call between you and your physician/provider. We have found that certain health care needs can be provided without the need for a physical exam.  This service lets us provide the care you need with a short phone conversation.  If a prescription is necessary we can send it directly to your pharmacy.  If lab work is needed we can place an order for that and you can then stop by our lab to have the test done at a later time.    Telephone visits are billed at different rates depending on your insurance coverage. During this emergency period, for some insurers they may be billed the same as an in-person visit.  Please reach out to your insurance provider with any questions.    If during the course of the call the physician/provider feels a telephone visit is not appropriate, you will not be charged for this service.\"    Patient has given verbal consent for Telephone visit?  Yes    What phone number would you like to be contacted at? [phone]    How would you like to obtain your AVS? Ferdinand Rick     Hilaria Bonner is a 29 year old female who presents via phone visit today for the following health issues:    HPI       Concern - Burning & Tingling     When did it start?: about 3 weeks     Any strain/injury, other illness, or event to trigger this problem?   no    Previous history of similar problem:   no      Location:           [facility]    Describe it:   Tingling, Burning & numbness    Severity: severe      Progression of symptoms from onset until now:  worsening      Accompanying Signs & Symptoms:  Burning,Tingling & numbness l   What have you noticed that tends to make this worse?     Moving around      What have you noticed that tends to make this better?     None      What have you done (this time) to try to " treat this problem yourself?     None      Did it help?     no       Patient well known to me with history of massive PE and cardiac arrest resulting- on xarelto- negative ddimer and troponin in emergency department one week ago   In pain- tingling sensation in hands for about a week.  Had Chest pain and went to emergency department lateral week and hooked up to ekg and xray and didn't find anything.  Thought symptoms related to covid- another week.  Burning sensation Around mouth tingling like at dentist  This morning travled to legs and feet and going up thighs.  Another reason went to emergency department because unordinary  Can't sleep.  Hard to grab stuff.  I do have chest pain but evertime I go to emergency department and don't find anything.  Pressure type pain.  Has not been back to work- off work 1 1/2 months.  No signs of corona virus currently but did have coronoa virus   But does have sore throat at times- wakes up hard to swallow.   Can't feel anything in hands- both hands   Feels like Putting hand over a fire   No nausea or vomiting. No longer coughing. No cough or fever for more than 3 days .  Has been using a Stress ball- squeezing does that to try to get rid of numbness.  No headache  Does feel shortness of breath if walk for long periods of time- no longer than 10 minutes get shortness of breath   Eating bananas even though I don't like them- on potassium supplement  No longer diarrhea  Tingle around lips- burning both hands and calf to top of foot.  Tingle starting to move up thigh.  Chest pressure and when sit up feels like weight on chest.  Can be worse with walking upstairs   Haven't gotten B12 injection lately- last B12 3/9/2020   Did have hypokalemia and anemia in emergency department. On potassium supplement  Taking gabapentin 300 mg at bedtime and potassium as prescribed by emergency department - taking 20 meq potassium daily (two 10 meq)              Patient Active Problem List   Diagnosis      Morbid obesity (H)     Vitamin D deficiency     Elevated parathyroid hormone     Encounter for smoking cessation counseling     Menorrhagia with irregular cycle     Morbid (severe) obesity due to excess calories (H)     Pulmonary embolism with infarction (H)     Cardiac arrest, cause unspecified (H)     VT (ventricular tachycardia) (H)     Other pulmonary embolism with acute cor pulmonale, unspecified chronicity (H)     Secondary hyperparathyroidism, not elsewhere classified (H)     Past Surgical History:   Procedure Laterality Date     GYN SURGERY      2 C-sections     KNEE SURGERY  most recently - [id number]    3 surgeries in [place]     LAPAROSCOPIC GASTRIC SLEEVE N/A 3/26/2019    Procedure: Laparoscopic Sleeve Gastrectomy;  Surgeon: Luan Lopez MD;  Location: [facility] OR     ORTHOPEDIC SURGERY      2 knee meniscus surgery       Social History     Tobacco Use     Smoking status: Former Smoker     Years: 1.00     Start date: 2016     Last attempt to quit: 2018     Years since quittin.3     Smokeless tobacco: Never Used   Substance Use Topics     Alcohol use: Not Currently     Alcohol/week: 0.0 standard drinks     Comment: occasional     Family History   Problem Relation Age of Onset     Diabetes Father      Hypertension Father      Breast Cancer Sister 26        surgery only     Cancer Sister      Coronary Artery Disease Other         paternal aunt     Thyroid Disease Other         neice     Coronary Artery Disease Other      Cerebrovascular Disease Other      Breast Cancer Sister      Thyroid Disease Other      Cerebrovascular Disease No family hx of          Current Outpatient Medications   Medication Sig Dispense Refill     Calcium Citrate-Vitamin D 500-500 MG-UNIT CHEW Take 1 chew tab by mouth 2 times daily 180 tablet 3     CHANTIX STARTING MONTH AMADO 0.5 MG X 11 & 1 MG X 42 tablet        fluticasone (FLONASE) 50 MCG/ACT nasal spray 2 sprays       folic acid (FOLVITE) 1 MG tablet                gabapentin (NEURONTIN) 300 MG capsule Take 1 capsule (300 mg) by mouth nightly as needed 90 capsule 1     magic mouthwash (FIRST-MOUTHWASH BLM) compounding kit Take 30 mLs by mouth every 6 hours as needed for mouth sores 900 mL 2     Multiple Vitamins-Minerals (MULTIVITAMIN ADULTS PO) Take 1 tablet by mouth daily       omeprazole (PRILOSEC) 20 MG DR capsule Take 1 capsule (20 mg) by mouth daily as needed (heartburn) 90 capsule 3     polyethylene glycol (MIRALAX/GLYCOLAX) powder Take 17 g (1 capful) by mouth daily 850 g 3     rivaroxaban ANTICOAGULANT (XARELTO ANTICOAGULANT) 20 MG TABS tablet Take 1 tablet (20 mg) by mouth daily (with dinner) 90 tablet 3     tiZANidine (ZANAFLEX) 4 MG tablet Take 1 tablet (4 mg) by mouth nightly as needed for muscle spasms 20 tablet 0     topiramate (TOPAMAX) 25 MG tablet Take 3 tablets (75 mg) by mouth daily 90 tablet 3     varenicline (CHANTIX AMADO) 0.5 MG X 11 & 1 MG X 42 tablet Take 0.5 mg tab daily for 3 days, THEN 0.5 mg tab twice daily for 4 days, THEN 1 mg twice daily. 53 tablet 0     vitamin C (ASCORBIC ACID) 1000 MG TABS Take 1,000 mg by mouth       vitamin D3 (CHOLECALCIFEROL) 82427 units (250 mcg) capsule Take 1 capsule (10,000 Units) by mouth daily 30 capsule 1     vitamin D3 (CHOLECALCIFEROL) 2000 units tablet Take 1 tablet by mouth daily 90 tablet 1     BP Readings from Last 3 Encounters:   03/18/20 126/79   03/09/20 133/87   01/31/20 (!) 153/112    Wt Readings from Last 3 Encounters:   03/18/20 107 kg (236 lb)   03/09/20 106.8 kg (235 lb 8 oz)   01/31/20 112.5 kg (248 lb)                    Reviewed and updated as needed this visit by Provider  Tobacco  Allergies  Meds  Problems  Med Hx  Surg Hx  Fam Hx  Soc Hx          Review of Systems   Constitutional, HEENT, cardiovascular, pulmonary, gi and gu systems are negative, except as otherwise noted.       Objective   Reported vitals:  There were no vitals taken for this visit.   No distress, sounds  fatigued  PSYCH: Alert and oriented times 3; coherent speech, normal   rate and volume, able to articulate logical thoughts, able   to abstract reason, no tangential thoughts, no hallucinations   or delusions  Her affect is normal and pleasant  RESP: No cough, no audible wheezing, able to talk in full sentences  Remainder of exam unable to be completed due to telephone visits    Diagnostic Test Results:  none         Assessment/Plan:  1. Numbness and tingling  Will add gabapentin 100 mg twice a day in addition to 300 mg at bedtime.  Will come in for b12 injection and labs at [facility] service.   Symptoms certainly could be from b12 deficiency -has not had B12 injection in couple months   Follow up with us next week  Letter written for work excusing for one more week  - Vitamin B12; Future  - Comprehensive metabolic panel; Future  - CBC with platelets and differential; Future  - Iron and iron binding capacity; Future  - Ferritin; Future  - gabapentin (NEURONTIN) 100 MG capsule; Take 1 capsule (100 mg) by mouth 2 times daily And 300 mg at bedtime  Dispense: 60 capsule; Refill: 1    2. Anemia, unspecified type  Was anemic in emergency department last week- further evaluate  - CBC with platelets and differential; Future  - Iron and iron binding capacity; Future  - Ferritin; Future    No follow-ups on file.      Phone call duration:  15 minutes    Crissy Madrigal PA-C

## 2020-05-20 DIAGNOSIS — D64.9 ANEMIA, UNSPECIFIED TYPE: Primary | ICD-10-CM

## 2020-05-20 DIAGNOSIS — R20.2 NUMBNESS AND TINGLING: ICD-10-CM

## 2020-05-20 DIAGNOSIS — R20.0 NUMBNESS AND TINGLING: ICD-10-CM

## 2020-05-20 DIAGNOSIS — E53.8 VITAMIN B12 DEFICIENCY (NON ANEMIC): ICD-10-CM

## 2020-05-21 ENCOUNTER — MYC MEDICAL ADVICE (OUTPATIENT)
Dept: FAMILY MEDICINE | Facility: CLINIC | Age: 30
End: 2020-05-21

## 2020-05-21 DIAGNOSIS — M79.642 PAIN IN BOTH HANDS: Primary | ICD-10-CM

## 2020-05-21 DIAGNOSIS — M79.641 PAIN IN BOTH HANDS: Primary | ICD-10-CM

## 2020-05-21 NOTE — RESULT ENCOUNTER NOTE
Aleskey Manrique  Please stop taking the iron in your multivitamin.  You have too much iron.   Your B12 level was normal but on the lower end of normal.   Your blood sugar was on the higher side so if you can come in fasting in 2-3 weeks that would be helpful.   Schedule a lab only appointment in 2-3 weeks.  Please call or MyChart my office with any questions or concerns.    Crissy Madrigal, PAC

## 2020-05-21 NOTE — TELEPHONE ENCOUNTER
Routing to provider to review and advise, see [email or web address] message below.  Patient had virtual visit on 5/19/20 and discussed hand pain, numbness and tingling.    Mimi Marques RN  [facility]

## 2020-05-22 ENCOUNTER — MYC MEDICAL ADVICE (OUTPATIENT)
Dept: FAMILY MEDICINE | Facility: CLINIC | Age: 30
End: 2020-05-22

## 2020-05-22 DIAGNOSIS — R20.0 NUMBNESS AND TINGLING: Primary | ICD-10-CM

## 2020-05-22 DIAGNOSIS — R20.2 NUMBNESS AND TINGLING: Primary | ICD-10-CM

## 2020-05-23 ENCOUNTER — MYC MEDICAL ADVICE (OUTPATIENT)
Dept: FAMILY MEDICINE | Facility: CLINIC | Age: 30
End: 2020-05-23

## 2020-05-26 ENCOUNTER — MYC MEDICAL ADVICE (OUTPATIENT)
Dept: FAMILY MEDICINE | Facility: CLINIC | Age: 30
End: 2020-05-26

## 2020-05-27 ENCOUNTER — OFFICE VISIT (OUTPATIENT)
Dept: FAMILY MEDICINE | Facility: CLINIC | Age: 30
End: 2020-05-27
Payer: COMMERCIAL

## 2020-05-27 VITALS
DIASTOLIC BLOOD PRESSURE: 80 MMHG | OXYGEN SATURATION: 100 % | TEMPERATURE: 99.4 F | HEART RATE: 127 BPM | SYSTOLIC BLOOD PRESSURE: 128 MMHG

## 2020-05-27 DIAGNOSIS — E53.8 VITAMIN B12 DEFICIENCY (NON ANEMIC): ICD-10-CM

## 2020-05-27 DIAGNOSIS — R07.9 CHEST PAIN, UNSPECIFIED TYPE: ICD-10-CM

## 2020-05-27 DIAGNOSIS — R20.0 NUMBNESS AND TINGLING: Primary | ICD-10-CM

## 2020-05-27 DIAGNOSIS — E87.6 HYPOKALEMIA: ICD-10-CM

## 2020-05-27 DIAGNOSIS — R20.2 NUMBNESS AND TINGLING: Primary | ICD-10-CM

## 2020-05-27 DIAGNOSIS — D64.9 ANEMIA, UNSPECIFIED TYPE: ICD-10-CM

## 2020-05-27 DIAGNOSIS — M79.603 PAIN OF UPPER EXTREMITY, UNSPECIFIED LATERALITY: ICD-10-CM

## 2020-05-27 DIAGNOSIS — Z86.16 HISTORY OF 2019 NOVEL CORONAVIRUS DISEASE (COVID-19): ICD-10-CM

## 2020-05-27 DIAGNOSIS — R06.02 SOB (SHORTNESS OF BREATH): ICD-10-CM

## 2020-05-27 LAB
BASOPHILS # BLD AUTO: 0 10E9/L (ref 0–0.2)
BASOPHILS NFR BLD AUTO: 0.2 %
DIFFERENTIAL METHOD BLD: ABNORMAL
EOSINOPHIL # BLD AUTO: 0 10E9/L (ref 0–0.7)
EOSINOPHIL NFR BLD AUTO: 0.2 %
ERYTHROCYTE [DISTWIDTH] IN BLOOD BY AUTOMATED COUNT: 20.6 % (ref 10–15)
HCT VFR BLD AUTO: 27.6 % (ref 35–47)
HGB BLD-MCNC: 9.4 G/DL (ref 11.7–15.7)
LYMPHOCYTES # BLD AUTO: 1.9 10E9/L (ref 0.8–5.3)
LYMPHOCYTES NFR BLD AUTO: 35.8 %
MCH RBC QN AUTO: 37.9 PG (ref 26.5–33)
MCHC RBC AUTO-ENTMCNC: 34.1 G/DL (ref 31.5–36.5)
MCV RBC AUTO: 111 FL (ref 78–100)
MONOCYTES # BLD AUTO: 0.5 10E9/L (ref 0–1.3)
MONOCYTES NFR BLD AUTO: 9.9 %
NEUTROPHILS # BLD AUTO: 2.8 10E9/L (ref 1.6–8.3)
NEUTROPHILS NFR BLD AUTO: 53.9 %
PLATELET # BLD AUTO: 433 10E9/L (ref 150–450)
RBC # BLD AUTO: 2.48 10E12/L (ref 3.8–5.2)
RETICS # AUTO: 73.4 10E9/L (ref 25–95)
RETICS/RBC NFR AUTO: 3 % (ref 0.5–2)
TSH SERPL DL<=0.005 MIU/L-ACNC: 0.8 MU/L (ref 0.4–4)
WBC # BLD AUTO: 5.2 10E9/L (ref 4–11)

## 2020-05-27 PROCEDURE — 36415 COLL VENOUS BLD VENIPUNCTURE: CPT | Performed by: PHYSICIAN ASSISTANT

## 2020-05-27 PROCEDURE — 85060 BLOOD SMEAR INTERPRETATION: CPT | Performed by: PHYSICIAN ASSISTANT

## 2020-05-27 PROCEDURE — 99215 OFFICE O/P EST HI 40 MIN: CPT | Performed by: PHYSICIAN ASSISTANT

## 2020-05-27 PROCEDURE — 85045 AUTOMATED RETICULOCYTE COUNT: CPT | Performed by: PHYSICIAN ASSISTANT

## 2020-05-27 PROCEDURE — 84443 ASSAY THYROID STIM HORMONE: CPT | Performed by: PHYSICIAN ASSISTANT

## 2020-05-27 PROCEDURE — 85025 COMPLETE CBC W/AUTO DIFF WBC: CPT | Performed by: PHYSICIAN ASSISTANT

## 2020-05-27 RX ORDER — PREGABALIN 25 MG/1
CAPSULE ORAL
Qty: 90 CAPSULE | Refills: 1 | Status: ON HOLD | OUTPATIENT
Start: 2020-05-27 | End: 2020-06-02

## 2020-05-27 RX ORDER — POTASSIUM CHLORIDE 750 MG/1
10 TABLET, EXTENDED RELEASE ORAL 2 TIMES DAILY
Qty: 180 TABLET | Refills: 0 | Status: ON HOLD | OUTPATIENT
Start: 2020-05-27 | End: 2020-06-10

## 2020-05-27 NOTE — PROGRESS NOTES
Subjective     Hilaria Bonner is a 29 year old female who presents to clinic today for the following health issues:    HPI   Concern - SOB  Onset: April    Description:   SOB is still lingering since being diagnosed with COVID in mid April.     Intensity: moderate    Progression of Symptoms:  same    Accompanying Signs & Symptoms:  Chills, sore throat, muscle aches, loss of taste and smell. Hands hurt along with swelling and in feet/hands    Previous history of similar problem:   no    Precipitating factors:   Worsened by: COVID    Alleviating factors:  Improved by: nothing    Therapies Tried and outcome: gabapentin but that is not helping, bengaye did not work, topical gel makes it worse    Continues to have shortness of breath, chest pain and chills     Patient well known to me.  History of massive PE with cardiac arrest one year ago.  On xarelto History of B12 deficiency as well as gastric sleeve and other nutritional deficiencies   Last checked hemglobin and anemic but not iron deficiency and iron levels high and advised to discontinue iron in her mvi   Hands painful legs swelled over the weekend- both   Walks for short distances and shortness of breath.  History of covid infection one month ago and has not been back to work   Chest pain sometimes feels like necklace wearing down.  Sometimes constant pressure but when go to emergency department and everything ok.  Has had EMERGENCY DEPARTMENT visits with negative troponin and DDIMER   I get workman's comppay  due to corona virus  Complains of Numbness and tingles in hands, feet and legs and painful.  Like a burning sensation.  Gabapentin not working.   Was given voltaren topical gel and it made it worse  If touch something feels like paper cuts or like Putting hand in scalding hot water and both hands  Tried ice packs make it worse.  Tried bengay-no helpful  Can't sleep due to pain  Tylenol and aleve without improvement.    Now point where It is in my legs  No  cough   Burning for a few weeks   Gabapentin makes me tired- sleeps a half an hour  Calves burn  Hard to  stuff because can't feel it.   Can feel pressure and painful           Patient Active Problem List   Diagnosis     Morbid obesity (H)     Vitamin D deficiency     Elevated parathyroid hormone     Encounter for smoking cessation counseling     Menorrhagia with irregular cycle     Morbid (severe) obesity due to excess calories (H)     Pulmonary embolism with infarction (H)     Cardiac arrest, cause unspecified (H)     VT (ventricular tachycardia) (H)     Other pulmonary embolism with acute cor pulmonale, unspecified chronicity (H)     Secondary hyperparathyroidism, not elsewhere classified (H)     Paresthesias     Past Surgical History:   Procedure Laterality Date     GYN SURGERY      2 C-sections     KNEE SURGERY  most recently - [id number]    3 surgeries in [place]     LAPAROSCOPIC GASTRIC SLEEVE N/A 3/26/2019    Procedure: Laparoscopic Sleeve Gastrectomy;  Surgeon: Luan Lopez MD;  Location: [facility] OR     ORTHOPEDIC SURGERY      2 knee meniscus surgery       Social History     Tobacco Use     Smoking status: Former Smoker     Years: 1.00     Start date: 2016     Last attempt to quit: 2018     Years since quittin.3     Smokeless tobacco: Never Used   Substance Use Topics     Alcohol use: Not Currently     Alcohol/week: 0.0 standard drinks     Comment: occasional     Family History   Problem Relation Age of Onset     Diabetes Father      Hypertension Father      Breast Cancer Sister 26        surgery only     Cancer Sister      Coronary Artery Disease Other         paternal aunt     Thyroid Disease Other         neice     Coronary Artery Disease Other      Cerebrovascular Disease Other      Breast Cancer Sister      Thyroid Disease Other      Cerebrovascular Disease No family hx of          No current outpatient medications on file.     BP Readings from Last 3 Encounters:   20 (!)  120/95   05/27/20 128/80   03/18/20 126/79    Wt Readings from Last 3 Encounters:   05/30/20 109.7 kg (241 lb 13.5 oz)   03/18/20 107 kg (236 lb)   03/09/20 106.8 kg (235 lb 8 oz)                      Reviewed and updated as needed this visit by Provider  Tobacco  Allergies  Meds  Problems  Med Hx  Surg Hx  Fam Hx  Soc Hx          Review of Systems   Constitutional, HEENT, cardiovascular, pulmonary, gi and gu systems are negative, except as otherwise noted.      Objective    /80   Pulse 127   Temp 99.4  F (37.4  C)   SpO2 100%   There is no height or weight on file to calculate BMI.  Physical Exam   GENERAL: alert, no distress and obese  NECK: no adenopathy, no asymmetry, masses, or scars and thyroid normal to palpation  RESP: lungs clear to auscultation - no rales, rhonchi or wheezes  CV: regular rate and rhythm, normal S1 S2, no S3 or S4, no murmur, click or rub, no peripheral edema and peripheral pulses strong  ABDOMEN: soft, nontender, no hepatosplenomegaly, no masses and bowel sounds normal  MS: even squeezing hands is terribly uncomfortable for her - but no edema of hands or feet   Normal gait.   NEURO: Normal strength and tone, light touch and pinprick abnormal and mentation intact  PSYCH: mentation appears normal, tearful, anxious, judgement and insight intact and appearance well groomed, no eye contact  LYMPH: no cervical, supraclavicular, axillary, or inguinal adenopathy    Diagnostic Test Results:  Results for orders placed or performed in visit on 05/27/20   Reticulocyte Count     Status: Abnormal   Result Value Ref Range    % Retic 3.0 (H) 0.5 - 2.0 %    Absolute Retic 73.4 25 - 95 10e9/L   Blood Morphology Pathologist Review     Status: None   Result Value Ref Range    Copath Report       Patient Name: RADHA JOHANSEN  MR#: [id number]  Specimen #: [id number]  Collected: 5/27/2020  Received: 5/28/2020  Reported: 5/28/2020 11:14  Ordering Phy(s): CHESTER HERNANDEZ    For improved result  formatting, select 'View Enhanced Report Format' under   Linked Documents section.    TEST(S):  Peripheral Smear Morphology    FINAL DIAGNOSIS:  Peripheral smear, exam-  -Moderate macrocytic anemia  -See comment    COMMENT:  Moderate macrocytic anemia is present.  The causes of macrocytosis   typically include alcohol abuse, liver  disease, medications, B12/folate deficiency, hypothyroidism and   myelodysplastic syndrome. Vitamin B12 levels  are within normal limits ( per EPIC records).  There is no morphologic   evidence of dysplasia to suggest a  myelodysplastic syndrome.    Electronically signed out by:    Claire Okeefe M.D.    CLINICAL HISTORY:  29-old female, anemia, numbness and tingling,  history of massive PE and cardiac arrest, #history of B12 deficiency    PERIPHERAL BLOOD D WOO:    PERIPHERAL BLOOD DATA  Patient Value (Reference Range >18 year old female)  5.17 . . .WBC   (4.0-11.0 x 10*9/L)  2.48 . . .RBC   (3.8-5.2 x 10*12/L)  9.4 . . .HGB   (11.7-15.7 g/dL)  27.6 . . .HCT   (35.0-47.0 %)  111.3 . . .MCV   (78-100fL)  37.9 . . .MCH   (26.5-33.0 pg)  34.1 . . .MCHC   (31.5-36.5 g/dL)  20.6 . . .RDW   (10.0-15.0 %)  433 . . .PLT   (150-450 x 10*9/L)    PERIPHERAL BLOOD DIFFERENTIAL  (Reference ranges >18 year old female)    Absolute  2.79. .Neutrophils,segmented and bands    (1.6 - 8.3 x 10*9/L)  1.85. .Lymphocytes    (0.8 - 5.3 x 10*9/L)  0.51. .Monocytes    (0 -1.3 x 10*9/L)  0.01. .Eosinophils    (0 - 0.7 x 10*9/L)  0.01. .Basophils    (0 - 0.2 x 10*9/L)    PERIPHERAL MORPHOLOGY:    ERYTHROCYTES: The red cells appear overall moderately decreased in number,   hypochromic and microcytic.  Anisopoikilocytosis is present with macrocytic cells mixed in with more   normocytic cells. Rouleaux is not  evident. Polychromasia is increased.    LEUKOCYTES: The whi te cells appear normal in number and morphology. Rare   hypersegmented neutrophil is present.  Dysplastic changes are not present. Left shift or  circulating blasts are   not seen.    PLATELETS: The platelets appear normal in number and are well granulated.    The technical component of this testing was completed at the [facility], with the professional component performed   at the [facility]  Laboratory, [facility] [place]  [address] ([phone])    CPT Codes:  A: 39060-PSNA    COLLECTION SITE:  Client:  [facility]  Location:  [facility] ([facility])       CBC with platelets differential     Status: Abnormal   Result Value Ref Range    WBC 5.2 4.0 - 11.0 10e9/L    RBC Count 2.48 (L) 3.8 - 5.2 10e12/L    Hemoglobin 9.4 (L) 11.7 - 15.7 g/dL    Hematocrit 27.6 (L) 35.0 - 47.0 %     (H) 78 - 100 fl    MCH 37.9 (H) 26.5 - 33.0 pg    MCHC 34.1 31.5 - 36.5 g/dL    RDW 20.6 (H) 10.0 - 15.0 %    Platelet Count 433 150 - 450 10e9/L    % Neutrophils 53.9 %    % Lymphocytes 35.8 %    % Monocytes 9.9 %    % Eosinophils 0.2 %    % Basophils 0.2 %    Absolute Neutrophil 2.8 1.6 - 8.3 10e9/L    Absolute Lymphocytes 1.9 0.8 - 5.3 10e9/L    Absolute Monocytes 0.5 0.0 - 1.3 10e9/L    Absolute Eosinophils 0.0 0.0 - 0.7 10e9/L    Absolute Basophils 0.0 0.0 - 0.2 10e9/L    Diff Method Automated Method    TSH with free T4 reflex     Status: None   Result Value Ref Range    TSH 0.80 0.40 - 4.00 mU/L           Assessment & Plan     1. Numbness and tingling  Had missed her B12 injection and just received last week  Will switch from gabapentin to lyrica.  I am unsure if symptoms related to covid or B12 deficiency- tylenol 3 for bedtime.   Is more anemic but not iron deficiency anemia - peripheral smear not conclusive   - Reticulocyte Count  - Blood Morphology Pathologist Review  - CBC with platelets differential  - TSH with free T4 reflex  - pregabalin (LYRICA) 25 MG capsule; 25 mg at bedtime for 3 days then 25 mg twice a day for 3 days then 25 mg three times a  day  Dispense: 90 capsule; Refill: 1  - acetaminophen-codeine (TYLENOL #3) 300-30 MG tablet; Take 2 tablets by mouth nightly as needed for severe pain  Dispense: 20 tablet; Refill: 0    2. History of 2019 novel coronavirus disease (COVID-19)  Unsure if all symptoms related to COVID- has been more than a month since diagnosis     3. Pain of upper extremity, unspecified laterality  As above   - pregabalin (LYRICA) 25 MG capsule; 25 mg at bedtime for 3 days then 25 mg twice a day for 3 days then 25 mg three times a day  Dispense: 90 capsule; Refill: 1  - acetaminophen-codeine (TYLENOL #3) 300-30 MG tablet; Take 2 tablets by mouth nightly as needed for severe pain  Dispense: 20 tablet; Refill: 0    4. Chest pain, unspecified type  Patient really unable to tell me much about chest pain whether exertional or at rest   Reviewed with Dr. Kemal Masters and reviewed recommendations over the phone- history of cardiac arrest one year ago related to massive PE   Rule out cardiac disease in spite of normal troponins in emergency department.   - CT Angiogram coronary artery; Future    5. Hypokalemia  Is currently taking potassium - refilled potassium   - potassium chloride ER (KLOR-CON M) 10 MEQ CR tablet; Take 1 tablet (10 mEq) by mouth 2 times daily  Dispense: 180 tablet; Refill: 0    6. SOB (shortness of breath)  Shortness of breath since covid infection although normal oxygen saturations here and normal lung sounds with recent normal chest xray   - CT Angiogram coronary artery; Future    7. Anemia, unspecified type  Is anemic -  - Reticulocyte Count  - Blood Morphology Pathologist Review  - CBC with platelets differential    8. Vitamin B12 deficiency (non anemic)    - Reticulocyte Count  - Blood Morphology Pathologist Review  - CBC with platelets differential       FULL PPE WAS WORN DURING ENTIRE VISIT  - given history of covid and shortness of breath       Patient Instructions   Start lyrica 25 mg at bedtime for 3 days then  take twice a day for 3 days then three times a day   Discontinue gabapentin- taper down- take 100 mg twice a day for 2 days then 100 mg once a day for 2 days and discontinue   Follow up with me next week  Take tylenol with codeine 2 tablets at bedtime as needed for pain.   Take tylenol during the day   Return urgently if any change in symptoms.        Return in about 1 week (around 6/3/2020), or if symptoms worsen or fail to improve, for Phone Visit.    Crissy Madrigal PA-C  [facility]

## 2020-05-27 NOTE — PATIENT INSTRUCTIONS
Start lyrica 25 mg at bedtime for 3 days then take twice a day for 3 days then three times a day   Discontinue gabapentin- taper down- take 100 mg twice a day for 2 days then 100 mg once a day for 2 days and discontinue   Follow up with me next week  Take tylenol with codeine 2 tablets at bedtime as needed for pain.   Take tylenol during the day   Return urgently if any change in symptoms.

## 2020-05-27 NOTE — LETTER
May 27, 2020      Hilaria Bonner  [address]  [address]  [place] [address]        To Whom It May Concern,     Please excuse Hilaria from work due to illness until June 8 due to illness.         Sincerely,        Crissy Madrigal PA-C

## 2020-05-28 ENCOUNTER — TELEPHONE (OUTPATIENT)
Dept: NEUROLOGY | Facility: CLINIC | Age: 30
End: 2020-05-28

## 2020-05-28 ENCOUNTER — VIRTUAL VISIT (OUTPATIENT)
Dept: NEUROLOGY | Facility: CLINIC | Age: 30
End: 2020-05-28
Payer: COMMERCIAL

## 2020-05-28 ENCOUNTER — TELEPHONE (OUTPATIENT)
Dept: FAMILY MEDICINE | Facility: CLINIC | Age: 30
End: 2020-05-28

## 2020-05-28 DIAGNOSIS — G61.0 ACUTE INFECTIVE POLYNEURITIS (H): Primary | ICD-10-CM

## 2020-05-28 DIAGNOSIS — R29.898 LEG WEAKNESS, BILATERAL: ICD-10-CM

## 2020-05-28 LAB — COPATH REPORT: NORMAL

## 2020-05-28 PROCEDURE — 99204 OFFICE O/P NEW MOD 45 MIN: CPT | Mod: 95 | Performed by: PSYCHIATRY & NEUROLOGY

## 2020-05-28 RX ORDER — FAMOTIDINE 20 MG
1000 TABLET ORAL DAILY
Status: ON HOLD | COMMUNITY
End: 2020-06-10

## 2020-05-28 NOTE — PROGRESS NOTES
Visit Date:   05/28/2020      NEUROLOGY CLINIC VIRTUAL VIDEO VISIT       The visit is being done virtually because of the COVID-19 pandemic.      HISTORY:  Hilaria Bonner is a 29-year-old female I am seeing concerning numbness and paresthesias as well as pain and more recently leg weakness.      The patient had been working in a nursing home.  In April, she began feeling poorly, fatigued, short of breath and sweaty.  She tells me she did have COVID-19 testing done around 04/17 or 04/18 at an urgent care and she tested positive.  She has since been isolating.      About 3-4 weeks ago, she began experiencing paresthesias and numbness initially in her hands and then it spread around her face and then into her lower extremities.  The numbness has now taken on a distinct burning quality.  She indicates it has been hard for her to use her hands, but it is difficult to determine if this is from pain or weakness.  She was having difficulty walking due to the pain and now she indicates she has had more difficulty ambulating.  Her mother, who was present, points out that she tends to stagger and she has to use a walker to move about currently.      She recently had some blood work done.  She does have an anemia with the most recent hemoglobin of 9.4 and an elevated reticulocyte count.  A B12 level was 285.  Comprehensive metabolic panel was notable for glucose of 183.  B12 level was 285.      She denies any loss of vision or double vision.  Her mother, who was present, indicates at times her speech does not seem right, but generally it has not been slurred.  She has a sore throat that affects her swallowing, but it does not sound like she is regurgitating.      She was started on gabapentin for pain.  I believe the dose was initially at 100 mg in the morning, 100 mg in the afternoon and 300 mg at night.  She is now being switched over to Lyrica.  She still has pain.      She denies any difficulty with bowel or bladder  control.      She does not have a thermometer, so she is uncertain if she has fever, but she indicates things are hot to touch because of her hand symptoms.  She was having a cough and shortness of breath, but this seems to be improving.  She has developed a loss of taste and smell since she was diagnosed with COVID-19.      As noted above, she is isolating at home, although her mother visits.  No other family members have been sick recently.      PAST MEDICAL HISTORY:  Her past medical history is notable for a pulmonary embolus leading to a cardiac arrest a year ago.  This probably developed within the context of a long car ride from [place].  She got to [place].  She was also using Depo injection at that time and it sounds like the immobility and the Depo were thought to be the source of the blood clot, but this is speculative.      CURRENT MEDICATIONS:  Tylenol with Codeine, vitamin D, Flonase, gabapentin currently 300 mg at night, multivitamin, Prilosec, MiraLax, potassium, Lyrica 25 mg 3 times a day that she has not yet started, Xarelto, Tizanidine, Topamax 75 mg for weight.      ALLERGIES:  SHE HAS NO MEDICAL ALLERGIES.      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  She does use alcohol.      NEUROLOGIC EXAMINATION:  Exam via the video is notable for the patient being alert.  She appears to have full extraocular motility.  She has a symmetric smile.  She has good eye closure.  Her speech is clear.  She can raise her arms above her head.  She does have difficulty getting up from a seated position.  She had to rock several times.  When she stood up, she indicates her legs are shaky and she appeared to assume a widened base and had to sit down.      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      PLAN:  I told her at this juncture I cannot make a specific diagnosis.  She needs a formal neurologic examination.  I am concerned about the recent development of leg weakness.       I am going to refer her to see the doctor of the day at the [facility] at the [facility] for a formal neurologic examination.  I will be in touch with staff here to discuss this.  She is in agreement with that plan.      Total visit time was 42 minutes from 10:59 a.m. until 11:41 a.m.  The platform utilized was [employer].  The patient was in her home and I was at the [facility].     ADDENDUM: Responded to [employer] message about pain. Advised increased Gabapentin to 300 mg 3 times a day and hold on starting Lyrica for now.        MAKAYLA WARREN MD             D: 2020   T: 2020   MT: PK      Name:     RADHA JOHANSEN   MRN:      [id number]        Account:      [id number]   :      1990           Visit Date:   2020      Document: [id number]

## 2020-05-28 NOTE — LETTER
"    5/28/2020         RE: Hilaria Bonner  [address]  [address]  [facility] [place] [facility] [address]        Dear Colleague,    Thank you for referring your patient, Hilaria Bonner, to the [facility] [place]. Please see a copy of my visit note below.    Hilaria Bonner is a 29 year old female who is being evaluated via a billable video visit.      The patient has been notified of following:     \"This video visit will be conducted via a call between you and your physician/provider. We have found that certain health care needs can be provided without the need for an in-person physical exam.  This service lets us provide the care you need with a video conversation.  If a prescription is necessary we can send it directly to your pharmacy.  If lab work is needed we can place an order for that and you can then stop by our lab to have the test done at a later time.    Video visits are billed at different rates depending on your insurance coverage.  Please reach out to your insurance provider with any questions.    If during the course of the call the physician/provider feels a video visit is not appropriate, you will not be charged for this service.\"    Patient has given verbal consent for Video visit? Yes    How would you like to obtain your AVS? [facility]    Patient would like the video invitation sent by: Send to e-mail at: [email or web address]      Visit Start: 10:59  Visit End:   10:41    [employer] utilized    Patient in Home and Dr Sher at [facility]      Visit Date:   05/28/2020      NEUROLOGY CLINIC VIRTUAL VIDEO VISIT       The visit is being done virtually because of the COVID-19 pandemic.      HISTORY:  Hilaria Bonner is a 29-year-old female I am seeing concerning numbness and paresthesias as well as pain and more recently leg weakness.      The patient had been working in a nursing home.  In April, she began feeling poorly, fatigued, short of breath and sweaty.  She tells me she did have COVID-19 " testing done around 04/17 or 04/18 at an urgent care and she tested positive.  She has since been isolating.      About 3-4 weeks ago, she began experiencing paresthesias and numbness initially in her hands and then it spread around her face and then into her lower extremities.  The numbness has now taken on a distinct burning quality.  She indicates it has been hard for her to use her hands, but it is difficult to determine if this is from pain or weakness.  She was having difficulty walking due to the pain and now she indicates she has had more difficulty ambulating.  Her mother, who was present, points out that she tends to stagger and she has to use a walker to move about currently.      She recently had some blood work done.  She does have an anemia with the most recent hemoglobin of 9.4 and an elevated reticulocyte count.  A B12 level was 285.  Comprehensive metabolic panel was notable for glucose of 183.  B12 level was 285.      She denies any loss of vision or double vision.  Her mother, who was present, indicates at times her speech does not seem right, but generally it has not been slurred.  She has a sore throat that affects her swallowing, but it does not sound like she is regurgitating.      She was started on gabapentin for pain.  I believe the dose was initially at 100 mg in the morning, 100 mg in the afternoon and 300 mg at night.  She is now being switched over to Lyrica.  She still has pain.      She denies any difficulty with bowel or bladder control.      She does not have a thermometer, so she is uncertain if she has fever, but she indicates things are hot to touch because of her hand symptoms.  She was having a cough and shortness of breath, but this seems to be improving.  She has developed a loss of taste and smell since she was diagnosed with COVID-19.      As noted above, she is isolating at home, although her mother visits.  No other family members have been sick recently.      PAST  MEDICAL HISTORY:  Her past medical history is notable for a pulmonary embolus leading to a cardiac arrest a year ago.  This probably developed within the context of a long car ride from [place].  She got to [place].  She was also using Depo injection at that time and it sounds like the immobility and the Depo were thought to be the source of the blood clot, but this is speculative.      CURRENT MEDICATIONS:  Tylenol with Codeine, vitamin D, Flonase, gabapentin currently 300 mg at night, multivitamin, Prilosec, MiraLax, potassium, Lyrica 25 mg 3 times a day that she has not yet started, Xarelto, Tizanidine, Topamax 75 mg for weight.      ALLERGIES:  SHE HAS NO MEDICAL ALLERGIES.      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  She does use alcohol.      NEUROLOGIC EXAMINATION:  Exam via the video is notable for the patient being alert.  She appears to have full extraocular motility.  She has a symmetric smile.  She has good eye closure.  Her speech is clear.  She can raise her arms above her head.  She does have difficulty getting up from a seated position.  She had to rock several times.  When she stood up, she indicates her legs are shaky and she appeared to assume a widened base and had to sit down.      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      PLAN:  I told her at this juncture I cannot make a specific diagnosis.  She needs a formal neurologic examination.  I am concerned about the recent development of leg weakness.      I am going to refer her to see the doctor of the day at the [facility] [place] the [facility] for a formal neurologic examination.  I will be in touch with staff here to discuss this.  She is in agreement with that plan.      Total visit time was 42 minutes from 10:59 a.m. until 11:41 a.m.  The platform utilized was [employer].  The patient was in her home and I was at the [facility].     ADDENDUM: Responded to [employer] message about pain. Advised  increased Gabapentin to 300 mg 3 times a day and hold on starting Lyrica for now.        MOMO WARREN MD             D: 2020   T: 2020   MT: PK      Name:     RADHA JOHANSEN   MRN:      [id number]        Account:      [id number]   :      1990           Visit Date:   2020      Document: [id number]        Again, thank you for allowing me to participate in the care of your patient.        Sincerely,        Momo Warren MD

## 2020-05-28 NOTE — TELEPHONE ENCOUNTER
[facility] Call Center    Phone Message    May a detailed message be left on voicemail: yes     Reason for Call: Nita from [facility] called per the request of Dr. Sher and stated that patient needs to be seen by doctor of the day either today or tomorrow for numbness and tingling. That's all the information that she had. Pt did a video visit today with Dr. Sher, so you can review notes. Please call patient to schedule at [phone].     I called priority line to see how to schedule and was instructed by Chiquita to send a message and she would forward to team.     Action Taken: Message routed to:  [facility] ([facility] Neuro    Travel Screening: Not Applicable

## 2020-05-28 NOTE — PROGRESS NOTES
"Hilaria Bonner is a 29 year old female who is being evaluated via a billable video visit.      The patient has been notified of following:     \"This video visit will be conducted via a call between you and your physician/provider. We have found that certain health care needs can be provided without the need for an in-person physical exam.  This service lets us provide the care you need with a video conversation.  If a prescription is necessary we can send it directly to your pharmacy.  If lab work is needed we can place an order for that and you can then stop by our lab to have the test done at a later time.    Video visits are billed at different rates depending on your insurance coverage.  Please reach out to your insurance provider with any questions.    If during the course of the call the physician/provider feels a video visit is not appropriate, you will not be charged for this service.\"    Patient has given verbal consent for Video visit? Yes    How would you like to obtain your AVS? Ferdinand    Patient would like the video invitation sent by: Send to e-mail at: [email or web address]      Visit Start: 10:59  Visit End:   10:41    Rayna utilized    Patient in Home and Dr Sher at [facility]    "

## 2020-05-28 NOTE — TELEPHONE ENCOUNTER
Reason for Call:  Other call back    Detailed comments: Provider requested patient to call back if she wasn't able to schedule her heart ultrasound.     Phone Number Patient can be reached at: Cell number on file:    Telephone Information:   Mobile [phone]       Best Time: Anytime.    Can we leave a detailed message on this number? YES    Call taken on 5/28/2020 at 2:00 PM by Mary Grace Crowley

## 2020-05-29 ENCOUNTER — NURSE TRIAGE (OUTPATIENT)
Dept: NURSING | Facility: CLINIC | Age: 30
End: 2020-05-29

## 2020-05-29 ENCOUNTER — DOCUMENTATION ONLY (OUTPATIENT)
Dept: CARE COORDINATION | Facility: CLINIC | Age: 30
End: 2020-05-29

## 2020-05-29 ENCOUNTER — ANCILLARY PROCEDURE (OUTPATIENT)
Dept: ULTRASOUND IMAGING | Facility: CLINIC | Age: 30
End: 2020-05-29
Attending: INTERNAL MEDICINE
Payer: COMMERCIAL

## 2020-05-29 ENCOUNTER — HOSPITAL ENCOUNTER (INPATIENT)
Facility: CLINIC | Age: 30
LOS: 7 days | Discharge: NURSING, REHAB OR LONG-TERM CARE | End: 2020-06-05
Attending: INTERNAL MEDICINE | Admitting: PSYCHIATRY & NEUROLOGY
Payer: COMMERCIAL

## 2020-05-29 DIAGNOSIS — K64.9 HEMORRHOIDS, UNSPECIFIED HEMORRHOID TYPE: ICD-10-CM

## 2020-05-29 DIAGNOSIS — R20.2 PARESTHESIA: ICD-10-CM

## 2020-05-29 DIAGNOSIS — R26.81 UNSTEADY GAIT: ICD-10-CM

## 2020-05-29 DIAGNOSIS — R79.89 ELEVATED PARATHYROID HORMONE: ICD-10-CM

## 2020-05-29 DIAGNOSIS — U07.1 COVID-19: ICD-10-CM

## 2020-05-29 DIAGNOSIS — Z86.19 HISTORY OF VIRAL ILLNESS: ICD-10-CM

## 2020-05-29 DIAGNOSIS — R20.2 PARESTHESIAS: Primary | ICD-10-CM

## 2020-05-29 LAB
ALBUMIN SERPL-MCNC: 2.7 G/DL (ref 3.4–5)
ALP SERPL-CCNC: 156 U/L (ref 40–150)
ALT SERPL W P-5'-P-CCNC: 42 U/L (ref 0–50)
ANION GAP SERPL CALCULATED.3IONS-SCNC: 8 MMOL/L (ref 3–14)
AST SERPL W P-5'-P-CCNC: 46 U/L (ref 0–45)
BASOPHILS # BLD AUTO: 0 10E9/L (ref 0–0.2)
BASOPHILS NFR BLD AUTO: 0.2 %
BILIRUB SERPL-MCNC: 0.9 MG/DL (ref 0.2–1.3)
BUN SERPL-MCNC: 9 MG/DL (ref 7–30)
CA-I BLD-MCNC: 4.8 MG/DL (ref 4.4–5.2)
CA-I BLD-SCNC: 4.8 MG/DL (ref 4.4–5.2)
CALCIUM SERPL-MCNC: 8.4 MG/DL (ref 8.5–10.1)
CHLORIDE SERPL-SCNC: 106 MMOL/L (ref 94–109)
CO2 BLDCOV-SCNC: 23 MMOL/L (ref 21–28)
CO2 SERPL-SCNC: 25 MMOL/L (ref 20–32)
CREAT SERPL-MCNC: 0.63 MG/DL (ref 0.52–1.04)
CRP SERPL-MCNC: <2.9 MG/L (ref 0–8)
D DIMER PPP FEU-MCNC: 0.5 UG/ML FEU (ref 0–0.5)
DIFFERENTIAL METHOD BLD: ABNORMAL
EOSINOPHIL # BLD AUTO: 0 10E9/L (ref 0–0.7)
EOSINOPHIL NFR BLD AUTO: 0.3 %
ERYTHROCYTE [DISTWIDTH] IN BLOOD BY AUTOMATED COUNT: 21.5 % (ref 10–15)
GFR SERPL CREATININE-BSD FRML MDRD: >90 ML/MIN/{1.73_M2}
GLUCOSE BLD-MCNC: 100 MG/DL (ref 70–99)
GLUCOSE SERPL-MCNC: 96 MG/DL (ref 70–99)
HCT VFR BLD AUTO: 26.4 % (ref 35–47)
HCT VFR BLD CALC: 27 %PCV (ref 35–47)
HGB BLD CALC-MCNC: 9.2 G/DL (ref 11.7–15.7)
HGB BLD-MCNC: 8.8 G/DL (ref 11.7–15.7)
IMM GRANULOCYTES # BLD: 0 10E9/L (ref 0–0.4)
IMM GRANULOCYTES NFR BLD: 0.2 %
LACTATE BLD-SCNC: 2.1 MMOL/L (ref 0.7–2)
LACTATE BLD-SCNC: 4.7 MMOL/L (ref 0.7–2)
LIPASE SERPL-CCNC: 294 U/L (ref 73–393)
LYMPHOCYTES # BLD AUTO: 1.4 10E9/L (ref 0.8–5.3)
LYMPHOCYTES NFR BLD AUTO: 23 %
MAGNESIUM SERPL-MCNC: 2 MG/DL (ref 1.6–2.3)
MCH RBC QN AUTO: 38.6 PG (ref 26.5–33)
MCHC RBC AUTO-ENTMCNC: 33.3 G/DL (ref 31.5–36.5)
MCV RBC AUTO: 116 FL (ref 78–100)
MONOCYTES # BLD AUTO: 0.6 10E9/L (ref 0–1.3)
MONOCYTES NFR BLD AUTO: 9 %
NEUTROPHILS # BLD AUTO: 4.1 10E9/L (ref 1.6–8.3)
NEUTROPHILS NFR BLD AUTO: 67.3 %
NRBC # BLD AUTO: 0 10*3/UL
NRBC BLD AUTO-RTO: 0 /100
NT-PROBNP SERPL-MCNC: 215 PG/ML (ref 0–450)
PCO2 BLDV: 40 MM HG (ref 40–50)
PH BLDV: 7.38 PH (ref 7.32–7.43)
PHOSPHATE SERPL-MCNC: 2.9 MG/DL (ref 2.5–4.5)
PLATELET # BLD AUTO: 422 10E9/L (ref 150–450)
PO2 BLDV: 39 MM HG (ref 25–47)
POTASSIUM BLD-SCNC: 3.7 MMOL/L (ref 3.4–5.3)
POTASSIUM SERPL-SCNC: 3.6 MMOL/L (ref 3.4–5.3)
PROT SERPL-MCNC: 6 G/DL (ref 6.8–8.8)
RBC # BLD AUTO: 2.28 10E12/L (ref 3.8–5.2)
SAO2 % BLDV FROM PO2: 72 %
SARS-COV-2 RNA SPEC QL NAA+PROBE: NORMAL
SODIUM BLD-SCNC: 138 MMOL/L (ref 133–144)
SODIUM SERPL-SCNC: 139 MMOL/L (ref 133–144)
SPECIMEN SOURCE: NORMAL
TROPONIN I SERPL-MCNC: <0.015 UG/L (ref 0–0.04)
TSH SERPL DL<=0.005 MIU/L-ACNC: 1.26 MU/L (ref 0.4–4)
WBC # BLD AUTO: 6.1 10E9/L (ref 4–11)

## 2020-05-29 PROCEDURE — 84425 ASSAY OF VITAMIN B-1: CPT | Performed by: INTERNAL MEDICINE

## 2020-05-29 PROCEDURE — 12000004 ZZH R&B IMCU UMMC

## 2020-05-29 PROCEDURE — 93308 TTE F-UP OR LMTD: CPT | Performed by: INTERNAL MEDICINE

## 2020-05-29 PROCEDURE — 25000128 H RX IP 250 OP 636: Performed by: INTERNAL MEDICINE

## 2020-05-29 PROCEDURE — 86140 C-REACTIVE PROTEIN: CPT | Performed by: INTERNAL MEDICINE

## 2020-05-29 PROCEDURE — 40000502 ZZHCL STATISTIC GLUCOSE ED POCT

## 2020-05-29 PROCEDURE — 99285 EMERGENCY DEPT VISIT HI MDM: CPT | Mod: 25 | Performed by: INTERNAL MEDICINE

## 2020-05-29 PROCEDURE — 82803 BLOOD GASES ANY COMBINATION: CPT

## 2020-05-29 PROCEDURE — 84484 ASSAY OF TROPONIN QUANT: CPT | Performed by: INTERNAL MEDICINE

## 2020-05-29 PROCEDURE — 86255 FLUORESCENT ANTIBODY SCREEN: CPT | Performed by: STUDENT IN AN ORGANIZED HEALTH CARE EDUCATION/TRAINING PROGRAM

## 2020-05-29 PROCEDURE — 80053 COMPREHEN METABOLIC PANEL: CPT | Performed by: INTERNAL MEDICINE

## 2020-05-29 PROCEDURE — 83880 ASSAY OF NATRIURETIC PEPTIDE: CPT | Performed by: INTERNAL MEDICINE

## 2020-05-29 PROCEDURE — 96361 HYDRATE IV INFUSION ADD-ON: CPT | Performed by: INTERNAL MEDICINE

## 2020-05-29 PROCEDURE — 83605 ASSAY OF LACTIC ACID: CPT | Performed by: INTERNAL MEDICINE

## 2020-05-29 PROCEDURE — 93308 TTE F-UP OR LMTD: CPT | Mod: 26 | Performed by: INTERNAL MEDICINE

## 2020-05-29 PROCEDURE — 84443 ASSAY THYROID STIM HORMONE: CPT | Performed by: INTERNAL MEDICINE

## 2020-05-29 PROCEDURE — 93010 ELECTROCARDIOGRAM REPORT: CPT | Mod: 59 | Performed by: INTERNAL MEDICINE

## 2020-05-29 PROCEDURE — U0003 INFECTIOUS AGENT DETECTION BY NUCLEIC ACID (DNA OR RNA); SEVERE ACUTE RESPIRATORY SYNDROME CORONAVIRUS 2 (SARS-COV-2) (CORONAVIRUS DISEASE [COVID-19]), AMPLIFIED PROBE TECHNIQUE, MAKING USE OF HIGH THROUGHPUT TECHNOLOGIES AS DESCRIBED BY CMS-2020-01-R: HCPCS | Performed by: INTERNAL MEDICINE

## 2020-05-29 PROCEDURE — 25000125 ZZHC RX 250: Performed by: STUDENT IN AN ORGANIZED HEALTH CARE EDUCATION/TRAINING PROGRAM

## 2020-05-29 PROCEDURE — 40000498 ZZHCL STATISTIC POTASSIUM ED POCT

## 2020-05-29 PROCEDURE — 25000132 ZZH RX MED GY IP 250 OP 250 PS 637: Performed by: STUDENT IN AN ORGANIZED HEALTH CARE EDUCATION/TRAINING PROGRAM

## 2020-05-29 PROCEDURE — 83690 ASSAY OF LIPASE: CPT | Performed by: INTERNAL MEDICINE

## 2020-05-29 PROCEDURE — 84100 ASSAY OF PHOSPHORUS: CPT | Performed by: INTERNAL MEDICINE

## 2020-05-29 PROCEDURE — 85025 COMPLETE CBC W/AUTO DIFF WBC: CPT | Performed by: INTERNAL MEDICINE

## 2020-05-29 PROCEDURE — 99291 CRITICAL CARE FIRST HOUR: CPT | Mod: 25 | Performed by: INTERNAL MEDICINE

## 2020-05-29 PROCEDURE — 85379 FIBRIN DEGRADATION QUANT: CPT | Performed by: INTERNAL MEDICINE

## 2020-05-29 PROCEDURE — 40000497 ZZHCL STATISTIC SODIUM ED POCT

## 2020-05-29 PROCEDURE — 83516 IMMUNOASSAY NONANTIBODY: CPT | Performed by: STUDENT IN AN ORGANIZED HEALTH CARE EDUCATION/TRAINING PROGRAM

## 2020-05-29 PROCEDURE — 25800030 ZZH RX IP 258 OP 636: Performed by: INTERNAL MEDICINE

## 2020-05-29 PROCEDURE — 86431 RHEUMATOID FACTOR QUANT: CPT | Performed by: INTERNAL MEDICINE

## 2020-05-29 PROCEDURE — 82330 ASSAY OF CALCIUM: CPT

## 2020-05-29 PROCEDURE — 93005 ELECTROCARDIOGRAM TRACING: CPT | Performed by: INTERNAL MEDICINE

## 2020-05-29 PROCEDURE — 96375 TX/PRO/DX INJ NEW DRUG ADDON: CPT | Performed by: INTERNAL MEDICINE

## 2020-05-29 PROCEDURE — 83735 ASSAY OF MAGNESIUM: CPT | Performed by: INTERNAL MEDICINE

## 2020-05-29 PROCEDURE — 40000501 ZZHCL STATISTIC HEMATOCRIT ED POCT

## 2020-05-29 PROCEDURE — 82330 ASSAY OF CALCIUM: CPT | Performed by: INTERNAL MEDICINE

## 2020-05-29 RX ORDER — PREGABALIN 50 MG/1
50 CAPSULE ORAL 3 TIMES DAILY
Status: DISCONTINUED | OUTPATIENT
Start: 2020-05-29 | End: 2020-05-30

## 2020-05-29 RX ORDER — PREGABALIN 50 MG/1
50 CAPSULE ORAL 3 TIMES DAILY
Status: DISCONTINUED | OUTPATIENT
Start: 2020-05-29 | End: 2020-05-29

## 2020-05-29 RX ORDER — LANOLIN ALCOHOL/MO/W.PET/CERES
100 CREAM (GRAM) TOPICAL DAILY
Status: DISCONTINUED | OUTPATIENT
Start: 2020-05-31 | End: 2020-06-05 | Stop reason: HOSPADM

## 2020-05-29 RX ORDER — OXCARBAZEPINE 150 MG/1
150 TABLET, FILM COATED ORAL 2 TIMES DAILY
Status: DISCONTINUED | OUTPATIENT
Start: 2020-05-29 | End: 2020-05-31

## 2020-05-29 RX ORDER — HYDROXYZINE HYDROCHLORIDE 25 MG/1
25 TABLET, FILM COATED ORAL EVERY 6 HOURS PRN
Status: DISCONTINUED | OUTPATIENT
Start: 2020-05-29 | End: 2020-05-29

## 2020-05-29 RX ORDER — KETOROLAC TROMETHAMINE 30 MG/ML
30 INJECTION, SOLUTION INTRAMUSCULAR; INTRAVENOUS ONCE
Status: COMPLETED | OUTPATIENT
Start: 2020-05-29 | End: 2020-05-29

## 2020-05-29 RX ORDER — HYDROXYZINE HYDROCHLORIDE 25 MG/1
25-50 TABLET, FILM COATED ORAL EVERY 6 HOURS PRN
Status: DISCONTINUED | OUTPATIENT
Start: 2020-05-29 | End: 2020-06-05 | Stop reason: HOSPADM

## 2020-05-29 RX ORDER — ACETAMINOPHEN 325 MG/1
650 TABLET ORAL EVERY 4 HOURS PRN
Status: DISCONTINUED | OUTPATIENT
Start: 2020-05-29 | End: 2020-06-05 | Stop reason: HOSPADM

## 2020-05-29 RX ORDER — LIDOCAINE 40 MG/G
CREAM TOPICAL
Status: COMPLETED | OUTPATIENT
Start: 2020-05-29 | End: 2020-05-29

## 2020-05-29 RX ORDER — TRAZODONE HYDROCHLORIDE 50 MG/1
50 TABLET, FILM COATED ORAL
Status: DISCONTINUED | OUTPATIENT
Start: 2020-05-29 | End: 2020-06-05 | Stop reason: HOSPADM

## 2020-05-29 RX ADMIN — LIDOCAINE: 40 CREAM TOPICAL at 21:30

## 2020-05-29 RX ADMIN — ACETAMINOPHEN 650 MG: 325 TABLET, FILM COATED ORAL at 22:11

## 2020-05-29 RX ADMIN — KETOROLAC TROMETHAMINE 30 MG: 30 INJECTION, SOLUTION INTRAMUSCULAR at 18:22

## 2020-05-29 RX ADMIN — PREGABALIN 50 MG: 50 CAPSULE ORAL at 21:30

## 2020-05-29 RX ADMIN — HYDROXYZINE HYDROCHLORIDE 25 MG: 25 TABLET, FILM COATED ORAL at 22:11

## 2020-05-29 RX ADMIN — SODIUM CHLORIDE 1000 ML: 9 INJECTION, SOLUTION INTRAVENOUS at 16:08

## 2020-05-29 RX ADMIN — SODIUM CHLORIDE 1000 ML: 9 INJECTION, SOLUTION INTRAVENOUS at 15:00

## 2020-05-29 ASSESSMENT — ENCOUNTER SYMPTOMS
HEADACHES: 0
FEVER: 0
NECK STIFFNESS: 0
COLOR CHANGE: 0
ARTHRALGIAS: 0
ABDOMINAL PAIN: 0
DIFFICULTY URINATING: 0
TREMORS: 1
COUGH: 0
NUMBNESS: 1
SHORTNESS OF BREATH: 1
CONFUSION: 0
EYE REDNESS: 0
WEAKNESS: 1

## 2020-05-29 ASSESSMENT — MIFFLIN-ST. JEOR: SCORE: 1807.7

## 2020-05-29 NOTE — ED PROVIDER NOTES
[place] EMERGENCY DEPARTMENT ([facility])  5/29/20    History     Chief Complaint   Patient presents with     Shortness of Breath     Chest Pain     Numbness     The history is provided by the patient and medical records.     Hilaria Bonner is a 29 year old female with a past medical history significant for DVT, massive PE c/b cardiac arrest and recent COVID-19 infection who presents here to the Emergency Department due to shortness of breath, chest pain and numbness.    Per chart review patient had a virtual visit with neurology done yesterday due to numbness and paresthesias. 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 Patient had a COVID-19 test done at  around 4/17/2020 and had tested positive. She had since been isolating. Patient noted that 3-4 weeks prior to her visit yesterday she began experiencing paresthesias and numbness initially in her hands that spread to her face/lower extremities. Patient described it as a distinct burning quality. Noted it was hard to use her hands and had difficulty walking. Patients mother who was present indicated that her speech at times did not seem right and sounded slurred. Noted improving cough and shortness of breath. Patient was advised increasing her gabapentin to 300 mg x3 daily and hold off on starting Lyrica. Patient was advised to be evaluated in person.    Here in the ED patient reports that she has been having numbness in her feet, hands and her face. States that over the past 2 days she has been unable to walk due to the numbness. States that she feel earlier today and hit her right knee and is now complaining of right knee pain. Has noted some ongoing chest pain. Patient also states that she has been having worsening generalized weakness along with some tremors. Denies cough or fevers.    I have reviewed the Medications, Allergies, Past  Medical and Surgical History, and Social History in the [facility] system.  PAST MEDICAL HISTORY:   Past Medical History:   Diagnosis Date     Acute kidney injury (H) 2019     Cardiac arrest (H) 2019     Earache or other ear, nose, or throat complaint 12/15    tyroid     Pulmonary embolus (H) 2019       PAST SURGICAL HISTORY:   Past Surgical History:   Procedure Laterality Date     GYN SURGERY      2 C-sections     KNEE SURGERY  most recently - [id number]    3 surgeries in [place]     LAPAROSCOPIC GASTRIC SLEEVE N/A 3/26/2019    Procedure: Laparoscopic Sleeve Gastrectomy;  Surgeon: Luan Lopez MD;  Location: [facility] OR     ORTHOPEDIC SURGERY      2 knee meniscus surgery       Past medical history, past surgical history, medications, and allergies were reviewed with the patient. Additional pertinent items: None    FAMILY HISTORY:   Family History   Problem Relation Age of Onset     Diabetes Father      Hypertension Father      Breast Cancer Sister 26        surgery only     Cancer Sister      Coronary Artery Disease Other         paternal aunt     Thyroid Disease Other         neice     Coronary Artery Disease Other      Cerebrovascular Disease Other      Breast Cancer Sister      Thyroid Disease Other      Cerebrovascular Disease No family hx of        SOCIAL HISTORY:   Social History     Tobacco Use     Smoking status: Former Smoker     Years: 1.00     Start date: 2016     Last attempt to quit: 2018     Years since quittin.3     Smokeless tobacco: Never Used   Substance Use Topics     Alcohol use: Not Currently     Alcohol/week: 0.0 standard drinks     Comment: occasional     Social history was reviewed with the patient. Additional pertinent items: None      Patient's Medications   New Prescriptions    No medications on file   Previous Medications    ACETAMINOPHEN-CODEINE (TYLENOL #3) 300-30 MG TABLET    Take 2 tablets by mouth nightly as needed for severe pain    CALCIUM CITRATE-VITAMIN  D 500-500 MG-UNIT CHEW    Take 1 chew tab by mouth 2 times daily    CHANTIX STARTING MONTH AMADO 0.5 MG X 11 & 1 MG X 42 TABLET        DICLOFENAC (VOLTAREN) 1 % TOPICAL GEL    Place 2 g onto the skin 4 times daily    FLUTICASONE (FLONASE) 50 MCG/ACT NASAL SPRAY    2 sprays    FOLIC ACID (FOLVITE) 1 MG TABLET        GABAPENTIN (NEURONTIN) 100 MG CAPSULE    Take 1 capsule (100 mg) by mouth 2 times daily And 300 mg at bedtime    GABAPENTIN (NEURONTIN) 300 MG CAPSULE    Take 1 capsule (300 mg) by mouth nightly as needed    MAGIC MOUTHWASH (FIRST-MOUTHWASH BLM) COMPOUNDING KIT    Take 30 mLs by mouth every 6 hours as needed for mouth sores    MULTIPLE VITAMINS-MINERALS (MULTIVITAMIN ADULTS PO)    Take 1 tablet by mouth daily    OMEPRAZOLE (PRILOSEC) 20 MG DR CAPSULE    Take 1 capsule (20 mg) by mouth daily as needed (heartburn)    POLYETHYLENE GLYCOL (MIRALAX/GLYCOLAX) POWDER    Take 17 g (1 capful) by mouth daily    POTASSIUM CHLORIDE ER (KLOR-CON M) 10 MEQ CR TABLET    Take 1 tablet (10 mEq) by mouth 2 times daily    PREGABALIN (LYRICA) 25 MG CAPSULE    25 mg at bedtime for 3 days then 25 mg twice a day for 3 days then 25 mg three times a day    RIVAROXABAN ANTICOAGULANT (XARELTO ANTICOAGULANT) 20 MG TABS TABLET    Take 1 tablet (20 mg) by mouth daily (with dinner)    TIZANIDINE (ZANAFLEX) 4 MG TABLET    Take 1 tablet (4 mg) by mouth nightly as needed for muscle spasms    TOPIRAMATE (TOPAMAX) 25 MG TABLET    Take 3 tablets (75 mg) by mouth daily    VARENICLINE (CHANTIX AMADO) 0.5 MG X 11 & 1 MG X 42 TABLET    Take 0.5 mg tab daily for 3 days, THEN 0.5 mg tab twice daily for 4 days, THEN 1 mg twice daily.    VITAMIN C (ASCORBIC ACID) 1000 MG TABS    Take 1,000 mg by mouth    VITAMIN D, CHOLECALCIFEROL, 25 MCG (1000 UT) CAPS    Take 1,000 Units by mouth daily    VITAMIN D3 (CHOLECALCIFEROL) 26213 UNITS (250 MCG) CAPSULE    Take 1 capsule (10,000 Units) by mouth daily    VITAMIN D3 (CHOLECALCIFEROL) 2000 UNITS TABLET    Take  "1 tablet by mouth daily   Modified Medications    No medications on file   Discontinued Medications    No medications on file        No Known Allergies     Review of Systems   Constitutional: Negative for fever.   HENT: Negative for congestion.    Eyes: Negative for redness.   Respiratory: Positive for shortness of breath. Negative for cough.    Cardiovascular: Positive for chest pain.   Gastrointestinal: Negative for abdominal pain.   Genitourinary: Negative for difficulty urinating.   Musculoskeletal: Negative for arthralgias and neck stiffness.        Positive for right knee pain   Skin: Negative for color change.   Neurological: Positive for tremors, weakness (Generalized) and numbness (Feet, hands, face). Negative for headaches.   Psychiatric/Behavioral: Negative for confusion.       Physical Exam   BP: 129/88  Pulse: 117  Heart Rate: 115  Temp: 98.5  F (36.9  C)  Resp: 18  Height: 167.6 cm (5' 6\")  Weight: 106.6 kg (235 lb)  SpO2: 98 %      Physical Exam  Constitutional:       General: She is not in acute distress.     Appearance: She is not diaphoretic.   HENT:      Head: Atraumatic.      Mouth/Throat:      Pharynx: No oropharyngeal exudate.   Eyes:      General: No scleral icterus.     Pupils: Pupils are equal, round, and reactive to light.   Neck:      Musculoskeletal: Neck supple.   Cardiovascular:      Rate and Rhythm: Regular rhythm. Tachycardia present.      Heart sounds: Normal heart sounds. No murmur. No friction rub. No gallop.    Pulmonary:      Effort: Pulmonary effort is normal. No respiratory distress.      Breath sounds: Normal breath sounds. No stridor. No decreased breath sounds, wheezing, rhonchi or rales.   Chest:      Chest wall: No tenderness.   Abdominal:      General: Abdomen is flat. Bowel sounds are normal. There is no distension.      Palpations: Abdomen is soft. There is no mass.      Tenderness: There is no abdominal tenderness. There is no right CVA tenderness, left CVA tenderness, " guarding or rebound.      Hernia: No hernia is present.   Musculoskeletal:         General: No tenderness.   Skin:     General: Skin is warm.      Findings: No rash.   Neurological:      Cranial Nerves: No cranial nerve deficit.      Motor: Weakness present.      Gait: Gait abnormal.      Comments: Paresthesias on hands and feet but also tremors         ED Course   2:11 PM  The patient was seen and examined by Pee Gibson MD in Room ED33.        Procedures  Results for orders placed during the hospital encounter of 05/29/20   POC US ECHO LIMITED    Impression Limited Bedside Cardiac Ultrasound, performed and interpreted by me.   Indication: Chest Pain.  Parasternal long axis, parasternal short axis, apical 4 chamber and subcostal views were acquired.   Image quality was satisfactory.    Findings:    Global left ventricular function appears intact.  Chambers do not appear dilated.  There is no evidence of free fluid within the pericardium.    IMPRESSION: Grossly normal left ventricular function and chamber size.  No pericardial effusion.no dilated RV.              EKG Interpretation:      Interpreted by Pee Gibson MD, MD  Time reviewed: on arrival  Symptoms at time of EKG: cp   Rhythm: sinus tachycardia  Rate: Tachycardia  Axis: Normal  Ectopy: none  Conduction: normal  ST Segments/ T Waves: No ST-T wave changes  Q Waves: none  Comparison to prior: Unchanged from 6/2019 except tachy    Clinical Impression: no acute changes                   Critical Care Addendum    My initial assessment, based on my review of prehospital provider report, review of nursing observations, review of vital signs, focused history, physical exam, review of cardiac rhythm monitor, 12 lead ECG analysis and discussion with Neuro, established that Hilaria Bonner has focal neurologic abnormalities and respiratory failure, which requires immediate intervention, and therefore she is critically ill.     After the initial assessment, the  care team initiated multiple lab tests, initiated IV fluid administration and consulted with Neuro to provide stabilization care. Due to the critical nature of this patient, I reassessed nursing observations, vital signs, physical exam, review of cardiac rhythm monitor, 12 lead ECG analysis, neurologic status and respiratory status multiple times prior to her disposition.     Time also spent performing documentation, discussion with family to obtain medical information for decision making, reviewing test results, discussion with consultants and coordination of care.     Critical care time (excluding teaching time and procedures): 50 minutes.            Results for orders placed or performed during the hospital encounter of 05/29/20 (from the past 24 hour(s))   Comprehensive metabolic panel   Result Value Ref Range    Sodium 139 133 - 144 mmol/L    Potassium 3.6 3.4 - 5.3 mmol/L    Chloride 106 94 - 109 mmol/L    Carbon Dioxide 25 20 - 32 mmol/L    Anion Gap 8 3 - 14 mmol/L    Glucose 96 70 - 99 mg/dL    Urea Nitrogen 9 7 - 30 mg/dL    Creatinine 0.63 0.52 - 1.04 mg/dL    GFR Estimate >90 >60 mL/min/[1.73_m2]    GFR Estimate If Black >90 >60 mL/min/[1.73_m2]    Calcium 8.4 (L) 8.5 - 10.1 mg/dL    Bilirubin Total 0.9 0.2 - 1.3 mg/dL    Albumin 2.7 (L) 3.4 - 5.0 g/dL    Protein Total 6.0 (L) 6.8 - 8.8 g/dL    Alkaline Phosphatase 156 (H) 40 - 150 U/L    ALT 42 0 - 50 U/L    AST 46 (H) 0 - 45 U/L   CBC with platelets differential   Result Value Ref Range    WBC 6.1 4.0 - 11.0 10e9/L    RBC Count 2.28 (L) 3.8 - 5.2 10e12/L    Hemoglobin 8.8 (L) 11.7 - 15.7 g/dL    Hematocrit 26.4 (L) 35.0 - 47.0 %     (H) 78 - 100 fl    MCH 38.6 (H) 26.5 - 33.0 pg    MCHC 33.3 31.5 - 36.5 g/dL    RDW 21.5 (H) 10.0 - 15.0 %    Platelet Count 422 150 - 450 10e9/L    Diff Method Automated Method     % Neutrophils 67.3 %    % Lymphocytes 23.0 %    % Monocytes 9.0 %    % Eosinophils 0.3 %    % Basophils 0.2 %    % Immature Granulocytes  0.2 %    Nucleated RBCs 0 0 /100    Absolute Neutrophil 4.1 1.6 - 8.3 10e9/L    Absolute Lymphocytes 1.4 0.8 - 5.3 10e9/L    Absolute Monocytes 0.6 0.0 - 1.3 10e9/L    Absolute Eosinophils 0.0 0.0 - 0.7 10e9/L    Absolute Basophils 0.0 0.0 - 0.2 10e9/L    Abs Immature Granulocytes 0.0 0 - 0.4 10e9/L    Absolute Nucleated RBC 0.0    Calcium ionized whole blood   Result Value Ref Range    Calcium Ionized Whole Blood 4.8 4.4 - 5.2 mg/dL   D dimer quantitative   Result Value Ref Range    D Dimer 0.5 0.0 - 0.50 ug/ml FEU   Troponin I   Result Value Ref Range    Troponin I ES <0.015 0.000 - 0.045 ug/L   TSH with free T4 reflex   Result Value Ref Range    TSH 1.26 0.40 - 4.00 mU/L   Magnesium   Result Value Ref Range    Magnesium 2.0 1.6 - 2.3 mg/dL   Nt probnp inpatient   Result Value Ref Range    N-Terminal Pro BNP Inpatient 215 0 - 450 pg/mL   Phosphorus   Result Value Ref Range    Phosphorus 2.9 2.5 - 4.5 mg/dL   Lactic acid whole blood   Result Value Ref Range    Lactic Acid 4.7 (HH) 0.7 - 2.0 mmol/L   Lipase   Result Value Ref Range    Lipase 294 73 - 393 U/L   ISTAT gases elec ica gluc rashmi POCT   Result Value Ref Range    Ph Venous 7.38 7.32 - 7.43 pH    PCO2 Venous 40 40 - 50 mm Hg    PO2 Venous 39 25 - 47 mm Hg    Bicarbonate Venous 23 21 - 28 mmol/L    O2 Sat Venous 72 %    Sodium 138 133 - 144 mmol/L    Potassium 3.7 3.4 - 5.3 mmol/L    Glucose 100 (H) 70 - 99 mg/dL    Calcium Ionized 4.8 4.4 - 5.2 mg/dL    Hemoglobin 9.2 (L) 11.7 - 15.7 g/dL    Hematocrit - POCT 27 (L) 35.0 - 47.0 %PCV   EKG 12 lead   Result Value Ref Range    Interpretation ECG Click View Image link to view waveform and result    POC US ECHO LIMITED    Impression    Limited Bedside Cardiac Ultrasound, performed and interpreted by me.   Indication: Chest Pain.  Parasternal long axis, parasternal short axis, apical 4 chamber and subcostal views were acquired.   Image quality was satisfactory.    Findings:    Global left ventricular function  appears intact.  Chambers do not appear dilated.  There is no evidence of free fluid within the pericardium.    IMPRESSION: Grossly normal left ventricular function and chamber size.  No pericardial effusion.no dilated RV.      Medications   0.9% sodium chloride BOLUS (0 mLs Intravenous Stopped 5/29/20 1605)   0.9% sodium chloride BOLUS (1,000 mLs Intravenous New Bag 5/29/20 1608)   ketorolac (TORADOL) injection 30 mg (30 mg Intravenous Given 5/29/20 1822)             Assessments & Plan (with Medical Decision Making)    Acute paresthesias and tremors after COVID, now not able to ambulate, in the pt with h/o bariatric surgery, massive PE with arrest now on AC, elevated PTH with low K Ca on replacement, EKG without QT prologation or other findings to suggest electrolytes abnormalities, trop bnp , D Dimer all neg, POCUS Echo without pericardial effusion, wall motion abnormalitites or dilated RV, Neuro consult in progress-asked MRI brain and c spine with and without contrast. Not clear etiology but suspected post COVID GBS that has been recently reported. Will sign off to incoming EP.         I have reviewed the nursing notes.    I have reviewed the findings, diagnosis, plan and need for follow up with the patient.    New Prescriptions    No medications on file       Final diagnoses:   Paresthesia   Unsteady gait   COVID-19 recent     Carlos GUTIERREZ, am serving as a trained medical scribe to document services personally performed by Pee Gibson MD, based on the provider's statements to me.   Pee GUTIERREZ MD, was physically present and have reviewed and verified the accuracy of this note documented by Carlos Mason.    5/29/2020   [facility], [place], EMERGENCY DEPARTMENT     Pee Gibson MD  05/29/20 [id number]

## 2020-05-29 NOTE — TELEPHONE ENCOUNTER
She was calling to find out if her mom could come in with her while she was seen in the ED. She started having problems communicating yesterday : speaking and her hands are burning. She has already had a phone visit with her neurologist. The ED transferred her to us. I told her I can't say if her mom can go in, but when she checks in, tell them your mom is here and she can help tell the story of what is going on and see if she can come in , otherwise they can always talk to her on the phone.    Shannon Levin RN/ [facility]        Additional Information    [1] Follow-up call to recent contact AND [2] information only call, no triage required    Protocols used: INFORMATION ONLY CALL-A-AH

## 2020-05-29 NOTE — CONSULTS
"[facility]  Neurology Consultation    Patient Name:  Hilaria Bonner  MRN:  [id number]    :  1990  Date of Service:  May 29, 2020  Primary care provider:  Crissy Madrigal      Neurology consultation service was asked to see Hilaria Bonner by Dr. Gibson to evaluate numbness, tingling.    History of Present Illness:   29-year-old female with possible history of PE complicated by cardiac arrest ~1yr ago currently on Xarelto, bariatric surgery and recent COVID infection who presents w/ ~3-4wks progressive paresthesias followed by hyperasthesias and other associated symptoms. History impaired by patient's significant pain in hands and lower legs as well as significant anxiety and tearfulness. Patient was seen yesterday via video visit by Dr. Sher, who was able to perform a more detailed history but in brief:    Patient was diagnosed with COVID-19 around - after developing malaise, generalized fatigue, SOB. Has been socially isolating since. Roughly 3-4wks ago, she developed numbness in her bilateral hands in stocking glove distribution, both hands equally. ~6-7d ago, patient developed significant pain in her hands, as well as pain + numbness in her  Calves and a perioral, oral, tongue and nasal numbness in her. Describes this as tingling, pins-needles, burning. Associated stiffness + cramping frequently + \"band-like\" sensation around her calves. Has felt unsteady on her feet since that time as well, w/ generalized weakness.     Yesterday morning, hyperasthesias spread to feet. Also reports slurring and difficulty speaking since yesterday evening. This morning, experienced muslce spasms in hands, new onset tremor in R arm since awakening. This is notable when she is at rest, can't tell me if it's worse w/ movement. Also has tremor in BLE R>L upon standing. Worsening weakness of BLE and fell today.     No bowel or bladder incontinence. No changes in thinking. No " vertigo or changes in vision or hearing. No sense of taste or smell since COVID infection. No issues with regurgitation but has reported a sore throat that affects her swallow. PRevious cough + SOB, improving, has not checked temperature but is feeling warm. Chest pain - since accident. Worse / constant now. Headache for the past hour, typical headache related to stress (pressure in her middle forehead).    Has recently been found to have a hemoglobin of 9.4, B12 285, elevated iron and was told to stop taking her multivitamin. Trialed on gabapentin for neuropathic pain, did not help, switched to lyrica but still low dose.         ROS  A 10-point ROS was performed. Negative, other than stated per HPI.     PMH  Past Medical History:   Diagnosis Date     Acute kidney injury (H) 05/13/2019     Cardiac arrest (H) 05/13/2019     Earache or other ear, nose, or throat complaint 12/15    tyroid     Pulmonary embolus (H) 05/13/2019       PSH  Past Surgical History:   Procedure Laterality Date     GYN SURGERY      2 C-sections     KNEE SURGERY  most recently - [phone]    3 surgeries in [place]     LAPAROSCOPIC GASTRIC SLEEVE N/A 3/26/2019    Procedure: Laparoscopic Sleeve Gastrectomy;  Surgeon: Luan Lopez MD;  Location: [facility] OR     ORTHOPEDIC SURGERY      2 knee meniscus surgery       Medications   Current Facility-Administered Medications   Medication     cyanocobalamin injection 1,000 mcg     hydrOXYzine (ATARAX) tablet 25 mg     lidocaine (LMX4) cream     medroxyPROGESTERone (DEPO-PROVERA) syringe 150 mg     pregabalin (LYRICA) capsule 50 mg     Current Outpatient Medications   Medication     acetaminophen-codeine (TYLENOL #3) 300-30 MG tablet     Calcium Citrate-Vitamin D 500-500 MG-UNIT CHEW     CHANTIX STARTING MONTH AMADO 0.5 MG X 11 & 1 MG X 42 tablet     diclofenac (VOLTAREN) 1 % topical gel     fluticasone (FLONASE) 50 MCG/ACT nasal spray     folic acid (FOLVITE) 1 MG tablet     gabapentin (NEURONTIN) 100 MG  "capsule     gabapentin (NEURONTIN) 300 MG capsule     magic mouthwash (FIRST-MOUTHWASH BLM) compounding kit     Multiple Vitamins-Minerals (MULTIVITAMIN ADULTS PO)     omeprazole (PRILOSEC) 20 MG DR capsule     polyethylene glycol (MIRALAX/GLYCOLAX) powder     potassium chloride ER (KLOR-CON M) 10 MEQ CR tablet     pregabalin (LYRICA) 25 MG capsule     rivaroxaban ANTICOAGULANT (XARELTO ANTICOAGULANT) 20 MG TABS tablet     tiZANidine (ZANAFLEX) 4 MG tablet     topiramate (TOPAMAX) 25 MG tablet     varenicline (CHANTIX AMADO) 0.5 MG X 11 & 1 MG X 42 tablet     vitamin C (ASCORBIC ACID) 1000 MG TABS     Vitamin D, Cholecalciferol, 25 MCG (1000 UT) CAPS     vitamin D3 (CHOLECALCIFEROL) 68131 units (250 mcg) capsule     vitamin D3 (CHOLECALCIFEROL) 2000 units tablet       Allergies  No Known Allergies    Social History  Works as nursing assistant. Smokes, drinks alcohol.    Family History    No pertinent family hx.   Family History   Problem Relation Age of Onset     Diabetes Father      Hypertension Father      Breast Cancer Sister 26        surgery only     Cancer Sister      Coronary Artery Disease Other         paternal aunt     Thyroid Disease Other         neice     Coronary Artery Disease Other      Cerebrovascular Disease Other      Breast Cancer Sister      Thyroid Disease Other      Cerebrovascular Disease No family hx of        Physical Examination   Vitals: /86   Pulse 112   Temp 98.5  F (36.9  C) (Oral)   Resp 11   Ht 1.676 m (5' 6\")   Wt 106.6 kg (235 lb)   SpO2 100%   BMI 37.93 kg/m    General: Adult, distressed, cooperative  HEENT: NC/AT, no scleral icterus, op pink and moist.  Cardiac: Regular rate.  Chest: labored, short breaths at times but no accessory muscle movements.  Abdomen: S/NT/ND  Extremities: No LE swelling.  Skin: No rash or lesion.   Psych: crying intermittently, extremely anxious.  Neuro:  Mental status: Awake, alert, attentive, oriented. Fund of knowledge is sufficient. Recent " and remote memory appear intact. Speech is slowed, labored, tremulous at times. Naming + repeating intact. Comprehension + speech intact. Able to perform complex tasks w/o issue. Able to perform simple calculations (quarters in 2.25).   Cranial nerves: VFI. PERRL. Fundoscopic exam limited due to pupillary size but no overt abnormalities appreciated (discs not clearly seen). Eyes conjugate, EOMI w/o nystagmus, face asymmetric sensation - Decreased on R compared to L in all distributions, Light touch feels like pressure only on bilateral anterior cheeks/nose/mouth/chin w/ returning closer to normal sensation upon moving posteriorly (e.g. length-dependent sensory disturbance).  Palate elevation symmetric. Shoulder shrug strong b/l, tongue/uvula midline. Speech is labored, slowed, tremulous at times but when broken down into specific sounds, able to form these w/o dysarthria.   Motor: resting tremor of RUE seen intermittently throughout exam (low to variable amplitude, low/variable frequency affecting hand > arm); not suppressible by tactile stimuli but patient can put her hand under her leg and it appears to resolve after time with this. Similar postural component of  tremor is seen in b/l hands upon drift testing. No other abnormal movement appreciated. No increased tone in any extremity. Breakable strength in R deltoid (asymmetric, at least 5- to 5+, possibly explained by pain limitation), otherwise full strength throughout upper extremity w/o asymmetry (including B,T, WE,FE,FF, FAbd). BLE limited moreso by pain - slow to raise HF b/l but upon raising leg, she is able to maintain elevated position w/ full strength. KE+KF, DF + PF 5/5 and symmetric.   Reflexes: 1+ and symmetric at B,T,BR, P. Trace to absent at A.  No bhatia's. No ankle clonus. Toes down-going.  Sensory: Decreased light touch on R compared to L throughout arm, torso, legs; hyperasthesia distally at wrists down to hands + calves down to feet distal in  stocking-glove distribution. Vibration ~10sec at MCP b/l, 7 seconds at hallux b/l, ~10sec at MM; R side feels less than L side. Pinprick diminished in BUE down to mid-forearm/wrist, where it becomes hyperasthetic; PP in proximal legs feels closer to normal but upon moving distally becomes hyperasthetic.  When eyes closed, Finger-nose w/ end point missing on L > R.   Coordination: FNF slowed bilaterally, terminal dysmetria appreciated b/l (slowed more so on R than L), HTS mildly dysmetric more so on R>L (some component of pain-limitation). PIPE slowed b/l.  Gait: unable to test due to significant pain in feet and presenting w/ falls.        Investigations   Lactic acid - 4.7  D-dimer + troponin - negative  Bed side echo - negative  EKG - no troponin elevation or other concerning finding  No WBC    Impression  29-year-old female with possible history of PE complicated by cardiac arrest on Xarelto, bariatric surgery and recent COVID infection who presents w/ ~3-4wks progressive paresthesias followed by hyperasthesias affecting the hands, then calves,feet and perioral region. More recently developed cramping in hands, calves, slowed speech, tremor in RUE+RLE>LLE leading to worsening gait instability and fall. Presented additionally w/ acute on chronic chest pain. Exam pertinent for subjective sensory hyperasthesias of above described regions and R-sided diminished sensation, some e/o of sensory vs. Cerebellar dysfxn w/ preserved strength and reflexes present throughout. NIF's -40, negative cardiac w/u but elevated lactate of unclear etiology, anemia and elevated MCV.     Overall, patient's symptom constellation and clinical scenario prompt further evaluation. Of particular concern, we must rule out  Autoimmune,post-infectious progressive processes - GBS-variants are in the differential, and there are multiple cases of patient's with recent COVID infection developing GBS-like syndromes; this being said, she describes an  atypical progression of symptoms for this enitity. Patient has hx of bariatric surgery and vitamin deficiencies could also cause distal, asymmetric peripheral polyneuropathies, which with her having an elevated MCV + lactate of unclear other etiology, this seems to be a very likely cause so we will check vitamin levels and replace empirically. Additionally, with a hx of PE of overall undetermined etiology, rheumatologic conditions should be evaluated for as well, as these can lead to peripheral neuropathy. Lastly, patient is very anxious, emotionally labile and the majority of her symptoms are subjective and tremor has some functional components. There is likely a psychiatric component that is contributing to her symptoms, although unclear how much and functional causes are a diagnosis of exclusion; therefore, she will need to be further evaluated for previously discussed etiologies.      Plan:  #Progressive Paresthesias, Hyperasthesias, cramps,  #RUE Tremor,   #BLE postural-described gait instability  #Recent COVID infection  -Admit to neurology  -Repeat COVID testing  - MRI Brain + C-spine wow  - Likely will need LP to r/o GBS but will need to hold rivaroxaban prior  - Vitamin deficiency w/u including vitamin B1 vitamin E, vitamin B6, vitamin B12/MMA, folate,    - We will empirically treat with high-dose IV thiamine  - W/u for rheumatologic causes  - ganglioside GQ1B, although atypical will take a while to come back  -For pain and symptom management, we will attempt to try conservative treatments starting w/ lidocaine cream, increasing pta pregabalin, , pta tizanidine, treating anxiety   -We will consider scheduling oxcarb/carbamaz as well as amitriptyline  - PT + OT, appreciate recs    #Chest pain: acute on chronic since PE+cardiac arrest. Negative troponin, d-dimer, bedside ultrasound unremarkable, no leukocytosis, no fever.  -We will follow and treat conservatively at this time    #Anemia: high iron, low iron  binding capacity, ferritin elevated, MCV elevated. No e/o active bleeding.  - trend at this time  - B12/mma pending    #Hx of PE, cardiac arrest  -We will hold rivaroxaban at this time for potential LP, would start prophylactic heparin in a.m.      Patient was discussed with Dr. Sena.      Prabhjot Ponce  Neurology Resident  Pager [phone]
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
## 2020-05-29 NOTE — ED TRIAGE NOTES
C/o SOB and chest pain.  Last few weeks pt c/o burning sensation in hands and feet, numbness around mouth, and major change in balance. Fell earlier today and hit R knee, c/o right knee pain    Speech is slow and delayed, w/ right hand tremor     Tested positive for COVID beginning of April. Spoke to neuro via video chat yesterday who wanted her to be seen.   Is taking blood thinners for hx PE

## 2020-05-29 NOTE — ED NOTES
Bed: ED33  Expected date:   Expected time:   Means of arrival:   Comments:  Pt in room from triage

## 2020-05-30 ENCOUNTER — APPOINTMENT (OUTPATIENT)
Dept: CT IMAGING | Facility: CLINIC | Age: 30
End: 2020-05-30
Attending: EMERGENCY MEDICINE
Payer: COMMERCIAL

## 2020-05-30 ENCOUNTER — APPOINTMENT (OUTPATIENT)
Dept: MRI IMAGING | Facility: CLINIC | Age: 30
End: 2020-05-30
Attending: STUDENT IN AN ORGANIZED HEALTH CARE EDUCATION/TRAINING PROGRAM
Payer: COMMERCIAL

## 2020-05-30 ENCOUNTER — APPOINTMENT (OUTPATIENT)
Dept: ULTRASOUND IMAGING | Facility: CLINIC | Age: 30
End: 2020-05-30
Payer: COMMERCIAL

## 2020-05-30 PROBLEM — R20.2 PARESTHESIAS: Status: ACTIVE | Noted: 2020-05-30

## 2020-05-30 LAB
ALBUMIN SERPL-MCNC: 2.6 G/DL (ref 3.4–5)
ALP SERPL-CCNC: 162 U/L (ref 40–150)
ALT SERPL W P-5'-P-CCNC: 37 U/L (ref 0–50)
ANION GAP SERPL CALCULATED.3IONS-SCNC: 6 MMOL/L (ref 3–14)
APTT PPP: 32 SEC (ref 22–37)
AST SERPL W P-5'-P-CCNC: 36 U/L (ref 0–45)
BILIRUB SERPL-MCNC: 1.4 MG/DL (ref 0.2–1.3)
BUN SERPL-MCNC: 12 MG/DL (ref 7–30)
CALCIUM SERPL-MCNC: 8 MG/DL (ref 8.5–10.1)
CHLORIDE SERPL-SCNC: 113 MMOL/L (ref 94–109)
CO2 SERPL-SCNC: 24 MMOL/L (ref 20–32)
CREAT SERPL-MCNC: 0.62 MG/DL (ref 0.52–1.04)
ERYTHROCYTE [DISTWIDTH] IN BLOOD BY AUTOMATED COUNT: 21.5 % (ref 10–15)
ERYTHROCYTE [SEDIMENTATION RATE] IN BLOOD BY WESTERGREN METHOD: 33 MM/H (ref 0–20)
FOLATE SERPL-MCNC: 1.4 NG/ML
GFR SERPL CREATININE-BSD FRML MDRD: >90 ML/MIN/{1.73_M2}
GLUCOSE BLDC GLUCOMTR-MCNC: 121 MG/DL (ref 70–99)
GLUCOSE BLDC GLUCOMTR-MCNC: 97 MG/DL (ref 70–99)
GLUCOSE SERPL-MCNC: 106 MG/DL (ref 70–99)
HCG SERPL QL: NEGATIVE
HCT VFR BLD AUTO: 26.1 % (ref 35–47)
HGB BLD-MCNC: 8.6 G/DL (ref 11.7–15.7)
INR PPP: 1.57 (ref 0.86–1.14)
LACTATE BLD-SCNC: 1.3 MMOL/L (ref 0.7–2)
MCH RBC QN AUTO: 38.2 PG (ref 26.5–33)
MCHC RBC AUTO-ENTMCNC: 33 G/DL (ref 31.5–36.5)
MCV RBC AUTO: 116 FL (ref 78–100)
PLATELET # BLD AUTO: 494 10E9/L (ref 150–450)
POTASSIUM SERPL-SCNC: 4.3 MMOL/L (ref 3.4–5.3)
PROT SERPL-MCNC: 5.8 G/DL (ref 6.8–8.8)
RBC # BLD AUTO: 2.25 10E12/L (ref 3.8–5.2)
SARS-COV-2 PCR COMMENT: NORMAL
SARS-COV-2 RNA SPEC QL NAA+PROBE: NEGATIVE
SODIUM SERPL-SCNC: 142 MMOL/L (ref 133–144)
SPECIMEN SOURCE: NORMAL
VIT B12 SERPL-MCNC: 502 PG/ML (ref 193–986)
WBC # BLD AUTO: 8.5 10E9/L (ref 4–11)

## 2020-05-30 PROCEDURE — 86225 DNA ANTIBODY NATIVE: CPT | Performed by: EMERGENCY MEDICINE

## 2020-05-30 PROCEDURE — 85610 PROTHROMBIN TIME: CPT | Performed by: STUDENT IN AN ORGANIZED HEALTH CARE EDUCATION/TRAINING PROGRAM

## 2020-05-30 PROCEDURE — 40000275 ZZH STATISTIC RCP TIME EA 10 MIN

## 2020-05-30 PROCEDURE — 85730 THROMBOPLASTIN TIME PARTIAL: CPT | Performed by: STUDENT IN AN ORGANIZED HEALTH CARE EDUCATION/TRAINING PROGRAM

## 2020-05-30 PROCEDURE — 25800030 ZZH RX IP 258 OP 636: Performed by: STUDENT IN AN ORGANIZED HEALTH CARE EDUCATION/TRAINING PROGRAM

## 2020-05-30 PROCEDURE — 86235 NUCLEAR ANTIGEN ANTIBODY: CPT | Performed by: EMERGENCY MEDICINE

## 2020-05-30 PROCEDURE — 36415 COLL VENOUS BLD VENIPUNCTURE: CPT | Performed by: STUDENT IN AN ORGANIZED HEALTH CARE EDUCATION/TRAINING PROGRAM

## 2020-05-30 PROCEDURE — 82746 ASSAY OF FOLIC ACID SERUM: CPT | Performed by: EMERGENCY MEDICINE

## 2020-05-30 PROCEDURE — 84446 ASSAY OF VITAMIN E: CPT | Performed by: EMERGENCY MEDICINE

## 2020-05-30 PROCEDURE — 86255 FLUORESCENT ANTIBODY SCREEN: CPT | Performed by: EMERGENCY MEDICINE

## 2020-05-30 PROCEDURE — 25500064 ZZH RX 255 OP 636: Performed by: PSYCHIATRY & NEUROLOGY

## 2020-05-30 PROCEDURE — 84703 CHORIONIC GONADOTROPIN ASSAY: CPT | Performed by: EMERGENCY MEDICINE

## 2020-05-30 PROCEDURE — 84207 ASSAY OF VITAMIN B-6: CPT | Performed by: EMERGENCY MEDICINE

## 2020-05-30 PROCEDURE — 00000146 ZZHCL STATISTIC GLUCOSE BY METER IP

## 2020-05-30 PROCEDURE — 85613 RUSSELL VIPER VENOM DILUTED: CPT | Performed by: EMERGENCY MEDICINE

## 2020-05-30 PROCEDURE — 70553 MRI BRAIN STEM W/O & W/DYE: CPT

## 2020-05-30 PROCEDURE — 85027 COMPLETE CBC AUTOMATED: CPT | Performed by: STUDENT IN AN ORGANIZED HEALTH CARE EDUCATION/TRAINING PROGRAM

## 2020-05-30 PROCEDURE — 86038 ANTINUCLEAR ANTIBODIES: CPT | Performed by: EMERGENCY MEDICINE

## 2020-05-30 PROCEDURE — 82607 VITAMIN B-12: CPT | Performed by: EMERGENCY MEDICINE

## 2020-05-30 PROCEDURE — 00000167 ZZHCL STATISTIC INR NC: Performed by: EMERGENCY MEDICINE

## 2020-05-30 PROCEDURE — 25000128 H RX IP 250 OP 636: Performed by: STUDENT IN AN ORGANIZED HEALTH CARE EDUCATION/TRAINING PROGRAM

## 2020-05-30 PROCEDURE — 85652 RBC SED RATE AUTOMATED: CPT | Performed by: STUDENT IN AN ORGANIZED HEALTH CARE EDUCATION/TRAINING PROGRAM

## 2020-05-30 PROCEDURE — 86039 ANTINUCLEAR ANTIBODIES (ANA): CPT | Performed by: EMERGENCY MEDICINE

## 2020-05-30 PROCEDURE — 83921 ORGANIC ACID SINGLE QUANT: CPT | Performed by: EMERGENCY MEDICINE

## 2020-05-30 PROCEDURE — 71275 CT ANGIOGRAPHY CHEST: CPT

## 2020-05-30 PROCEDURE — 96365 THER/PROPH/DIAG IV INF INIT: CPT | Mod: 59 | Performed by: INTERNAL MEDICINE

## 2020-05-30 PROCEDURE — 72156 MRI NECK SPINE W/O & W/DYE: CPT

## 2020-05-30 PROCEDURE — 25000132 ZZH RX MED GY IP 250 OP 250 PS 637: Performed by: STUDENT IN AN ORGANIZED HEALTH CARE EDUCATION/TRAINING PROGRAM

## 2020-05-30 PROCEDURE — 80053 COMPREHEN METABOLIC PANEL: CPT | Performed by: STUDENT IN AN ORGANIZED HEALTH CARE EDUCATION/TRAINING PROGRAM

## 2020-05-30 PROCEDURE — 12000004 ZZH R&B IMCU UMMC

## 2020-05-30 PROCEDURE — 83605 ASSAY OF LACTIC ACID: CPT | Performed by: EMERGENCY MEDICINE

## 2020-05-30 PROCEDURE — 25000128 H RX IP 250 OP 636: Performed by: INTERNAL MEDICINE

## 2020-05-30 PROCEDURE — 36415 COLL VENOUS BLD VENIPUNCTURE: CPT | Performed by: EMERGENCY MEDICINE

## 2020-05-30 PROCEDURE — 94150 VITAL CAPACITY TEST: CPT

## 2020-05-30 PROCEDURE — 85730 THROMBOPLASTIN TIME PARTIAL: CPT | Performed by: EMERGENCY MEDICINE

## 2020-05-30 PROCEDURE — 83516 IMMUNOASSAY NONANTIBODY: CPT | Performed by: EMERGENCY MEDICINE

## 2020-05-30 PROCEDURE — 76705 ECHO EXAM OF ABDOMEN: CPT

## 2020-05-30 PROCEDURE — A9585 GADOBUTROL INJECTION: HCPCS | Performed by: PSYCHIATRY & NEUROLOGY

## 2020-05-30 PROCEDURE — 00000401 ZZHCL STATISTIC THROMBIN TIME NC: Performed by: EMERGENCY MEDICINE

## 2020-05-30 RX ORDER — LIDOCAINE 40 MG/G
CREAM TOPICAL
Status: DISCONTINUED | OUTPATIENT
Start: 2020-05-30 | End: 2020-06-02

## 2020-05-30 RX ORDER — POTASSIUM CHLORIDE 750 MG/1
20-40 TABLET, EXTENDED RELEASE ORAL
Status: DISCONTINUED | OUTPATIENT
Start: 2020-05-30 | End: 2020-06-05 | Stop reason: HOSPADM

## 2020-05-30 RX ORDER — POTASSIUM CHLORIDE 750 MG/1
10 TABLET, EXTENDED RELEASE ORAL 2 TIMES DAILY
Status: DISCONTINUED | OUTPATIENT
Start: 2020-05-30 | End: 2020-06-05 | Stop reason: HOSPADM

## 2020-05-30 RX ORDER — MAGNESIUM SULFATE HEPTAHYDRATE 40 MG/ML
4 INJECTION, SOLUTION INTRAVENOUS EVERY 4 HOURS PRN
Status: DISCONTINUED | OUTPATIENT
Start: 2020-05-30 | End: 2020-06-05 | Stop reason: HOSPADM

## 2020-05-30 RX ORDER — ONDANSETRON 4 MG/1
4 TABLET, ORALLY DISINTEGRATING ORAL EVERY 6 HOURS PRN
Status: DISCONTINUED | OUTPATIENT
Start: 2020-05-30 | End: 2020-06-05 | Stop reason: HOSPADM

## 2020-05-30 RX ORDER — VITAMIN B COMPLEX
1000 TABLET ORAL DAILY
Status: DISCONTINUED | OUTPATIENT
Start: 2020-05-30 | End: 2020-06-05 | Stop reason: HOSPADM

## 2020-05-30 RX ORDER — MULTIPLE VITAMINS W/ MINERALS TAB 9MG-400MCG
1 TAB ORAL DAILY
Status: DISCONTINUED | OUTPATIENT
Start: 2020-05-30 | End: 2020-06-05 | Stop reason: HOSPADM

## 2020-05-30 RX ORDER — IOPAMIDOL 755 MG/ML
73 INJECTION, SOLUTION INTRAVASCULAR ONCE
Status: COMPLETED | OUTPATIENT
Start: 2020-05-30 | End: 2020-05-30

## 2020-05-30 RX ORDER — GADOBUTROL 604.72 MG/ML
0.1 INJECTION INTRAVENOUS ONCE
Status: COMPLETED | OUTPATIENT
Start: 2020-05-30 | End: 2020-05-30

## 2020-05-30 RX ORDER — METOCLOPRAMIDE 5 MG/1
10 TABLET ORAL EVERY 6 HOURS PRN
Status: DISCONTINUED | OUTPATIENT
Start: 2020-05-30 | End: 2020-06-05 | Stop reason: HOSPADM

## 2020-05-30 RX ORDER — POTASSIUM CL/LIDO/0.9 % NACL 10MEQ/0.1L
10 INTRAVENOUS SOLUTION, PIGGYBACK (ML) INTRAVENOUS
Status: DISCONTINUED | OUTPATIENT
Start: 2020-05-30 | End: 2020-06-05 | Stop reason: HOSPADM

## 2020-05-30 RX ORDER — SODIUM CHLORIDE 9 MG/ML
INJECTION, SOLUTION INTRAVENOUS CONTINUOUS
Status: DISCONTINUED | OUTPATIENT
Start: 2020-05-30 | End: 2020-06-01

## 2020-05-30 RX ORDER — PROCHLORPERAZINE MALEATE 10 MG
10 TABLET ORAL EVERY 6 HOURS PRN
Status: DISCONTINUED | OUTPATIENT
Start: 2020-05-30 | End: 2020-06-05 | Stop reason: HOSPADM

## 2020-05-30 RX ORDER — POTASSIUM CHLORIDE 7.45 MG/ML
10 INJECTION INTRAVENOUS
Status: DISCONTINUED | OUTPATIENT
Start: 2020-05-30 | End: 2020-06-05 | Stop reason: HOSPADM

## 2020-05-30 RX ORDER — POLYETHYLENE GLYCOL 3350 17 G/17G
17 POWDER, FOR SOLUTION ORAL DAILY
Status: DISCONTINUED | OUTPATIENT
Start: 2020-05-30 | End: 2020-06-05 | Stop reason: HOSPADM

## 2020-05-30 RX ORDER — AMOXICILLIN 250 MG
2 CAPSULE ORAL 2 TIMES DAILY PRN
Status: DISCONTINUED | OUTPATIENT
Start: 2020-05-30 | End: 2020-06-05 | Stop reason: HOSPADM

## 2020-05-30 RX ORDER — POTASSIUM CHLORIDE 29.8 MG/ML
20 INJECTION INTRAVENOUS
Status: DISCONTINUED | OUTPATIENT
Start: 2020-05-30 | End: 2020-06-05 | Stop reason: HOSPADM

## 2020-05-30 RX ORDER — PREGABALIN 75 MG/1
75 CAPSULE ORAL 3 TIMES DAILY
Status: DISCONTINUED | OUTPATIENT
Start: 2020-05-30 | End: 2020-06-01

## 2020-05-30 RX ORDER — TRAMADOL HYDROCHLORIDE 50 MG/1
50 TABLET ORAL EVERY 6 HOURS PRN
Status: DISCONTINUED | OUTPATIENT
Start: 2020-05-30 | End: 2020-06-05 | Stop reason: HOSPADM

## 2020-05-30 RX ORDER — PROCHLORPERAZINE 25 MG
25 SUPPOSITORY, RECTAL RECTAL EVERY 12 HOURS PRN
Status: DISCONTINUED | OUTPATIENT
Start: 2020-05-30 | End: 2020-06-05 | Stop reason: HOSPADM

## 2020-05-30 RX ORDER — POTASSIUM CHLORIDE 1.5 G/1.58G
20-40 POWDER, FOR SOLUTION ORAL
Status: DISCONTINUED | OUTPATIENT
Start: 2020-05-30 | End: 2020-06-05 | Stop reason: HOSPADM

## 2020-05-30 RX ORDER — ASCORBIC ACID 500 MG
1000 TABLET ORAL DAILY
Status: DISCONTINUED | OUTPATIENT
Start: 2020-05-30 | End: 2020-06-05 | Stop reason: HOSPADM

## 2020-05-30 RX ORDER — AMOXICILLIN 250 MG
1 CAPSULE ORAL 2 TIMES DAILY PRN
Status: DISCONTINUED | OUTPATIENT
Start: 2020-05-30 | End: 2020-06-05 | Stop reason: HOSPADM

## 2020-05-30 RX ORDER — FOLIC ACID 1 MG/1
1 TABLET ORAL DAILY
Status: DISCONTINUED | OUTPATIENT
Start: 2020-05-30 | End: 2020-06-05 | Stop reason: HOSPADM

## 2020-05-30 RX ORDER — ONDANSETRON 2 MG/ML
4 INJECTION INTRAMUSCULAR; INTRAVENOUS EVERY 6 HOURS PRN
Status: DISCONTINUED | OUTPATIENT
Start: 2020-05-30 | End: 2020-06-05 | Stop reason: HOSPADM

## 2020-05-30 RX ORDER — NALOXONE HYDROCHLORIDE 0.4 MG/ML
.1-.4 INJECTION, SOLUTION INTRAMUSCULAR; INTRAVENOUS; SUBCUTANEOUS
Status: DISCONTINUED | OUTPATIENT
Start: 2020-05-30 | End: 2020-06-04

## 2020-05-30 RX ORDER — METOCLOPRAMIDE HYDROCHLORIDE 5 MG/ML
10 INJECTION INTRAMUSCULAR; INTRAVENOUS EVERY 6 HOURS PRN
Status: DISCONTINUED | OUTPATIENT
Start: 2020-05-30 | End: 2020-06-05 | Stop reason: HOSPADM

## 2020-05-30 RX ADMIN — Medication 1 TABLET: at 20:53

## 2020-05-30 RX ADMIN — PREGABALIN 50 MG: 50 CAPSULE ORAL at 08:25

## 2020-05-30 RX ADMIN — MELATONIN 1000 UNITS: at 08:25

## 2020-05-30 RX ADMIN — IOPAMIDOL 72 ML: 755 INJECTION, SOLUTION INTRAVENOUS at 20:35

## 2020-05-30 RX ADMIN — HYDROXYZINE HYDROCHLORIDE 25 MG: 25 TABLET ORAL at 22:36

## 2020-05-30 RX ADMIN — HYDROXYZINE HYDROCHLORIDE 25 MG: 25 TABLET ORAL at 04:36

## 2020-05-30 RX ADMIN — OXYCODONE HYDROCHLORIDE AND ACETAMINOPHEN 1000 MG: 500 TABLET ORAL at 08:23

## 2020-05-30 RX ADMIN — PREGABALIN 75 MG: 75 CAPSULE ORAL at 20:53

## 2020-05-30 RX ADMIN — SODIUM CHLORIDE: 9 INJECTION, SOLUTION INTRAVENOUS at 20:53

## 2020-05-30 RX ADMIN — TIZANIDINE 4 MG: 4 TABLET ORAL at 04:36

## 2020-05-30 RX ADMIN — GADOBUTROL 10 ML: 604.72 INJECTION INTRAVENOUS at 18:55

## 2020-05-30 RX ADMIN — MULTIPLE VITAMINS W/ MINERALS TAB 1 TABLET: TAB at 08:23

## 2020-05-30 RX ADMIN — OXCARBAZEPINE 150 MG: 150 TABLET, FILM COATED ORAL at 20:53

## 2020-05-30 RX ADMIN — TRAMADOL HYDROCHLORIDE 50 MG: 50 TABLET, FILM COATED ORAL at 16:38

## 2020-05-30 RX ADMIN — OXCARBAZEPINE 150 MG: 150 TABLET, FILM COATED ORAL at 00:03

## 2020-05-30 RX ADMIN — SODIUM CHLORIDE 1000 ML: 9 INJECTION, SOLUTION INTRAVENOUS at 00:03

## 2020-05-30 RX ADMIN — TRAZODONE HYDROCHLORIDE 50 MG: 50 TABLET ORAL at 04:36

## 2020-05-30 RX ADMIN — THIAMINE HYDROCHLORIDE 500 MG: 100 INJECTION, SOLUTION INTRAMUSCULAR; INTRAVENOUS at 14:13

## 2020-05-30 RX ADMIN — Medication 1 TABLET: at 08:24

## 2020-05-30 RX ADMIN — THIAMINE HYDROCHLORIDE 500 MG: 100 INJECTION, SOLUTION INTRAMUSCULAR; INTRAVENOUS at 00:03

## 2020-05-30 RX ADMIN — OXCARBAZEPINE 150 MG: 150 TABLET, FILM COATED ORAL at 08:25

## 2020-05-30 RX ADMIN — TRAZODONE HYDROCHLORIDE 50 MG: 50 TABLET ORAL at 22:36

## 2020-05-30 RX ADMIN — POTASSIUM CHLORIDE 10 MEQ: 750 TABLET, EXTENDED RELEASE ORAL at 08:24

## 2020-05-30 RX ADMIN — TIZANIDINE 4 MG: 4 TABLET ORAL at 22:36

## 2020-05-30 RX ADMIN — PREGABALIN 50 MG: 50 CAPSULE ORAL at 14:13

## 2020-05-30 RX ADMIN — POLYETHYLENE GLYCOL 3350 17 G: 17 POWDER, FOR SOLUTION ORAL at 08:29

## 2020-05-30 RX ADMIN — FOLIC ACID 1 MG: 1 TABLET ORAL at 08:21

## 2020-05-30 RX ADMIN — POTASSIUM CHLORIDE 10 MEQ: 750 TABLET, EXTENDED RELEASE ORAL at 20:53

## 2020-05-30 RX ADMIN — THIAMINE HYDROCHLORIDE 500 MG: 100 INJECTION, SOLUTION INTRAMUSCULAR; INTRAVENOUS at 08:29

## 2020-05-30 ASSESSMENT — ACTIVITIES OF DAILY LIVING (ADL)
AMBULATION: 3-->ASSISTIVE EQUIPMENT AND PERSON
SWALLOWING: 0-->SWALLOWS FOODS/LIQUIDS WITHOUT DIFFICULTY
WHICH_OF_THE_ABOVE_FUNCTIONAL_RISKS_HAD_A_RECENT_ONSET_OR_CHANGE?: AMBULATION;TRANSFERRING
ADLS_ACUITY_SCORE: 24
COGNITION: 0 - NO COGNITION ISSUES REPORTED
ADLS_ACUITY_SCORE: 24
RETIRED_EATING: 0-->INDEPENDENT
ADLS_ACUITY_SCORE: 24
ADLS_ACUITY_SCORE: 24
BATHING: 3-->ASSISTIVE EQUIPMENT AND PERSON
TRANSFERRING: 3-->ASSISTIVE EQUIPMENT AND PERSON
RETIRED_COMMUNICATION: 0-->UNDERSTANDS/COMMUNICATES WITHOUT DIFFICULTY
NUMBER_OF_TIMES_PATIENT_HAS_FALLEN_WITHIN_LAST_SIX_MONTHS: 2
DRESS: 2-->ASSISTIVE PERSON
TOILETING: 3-->ASSISTIVE EQUIPMENT AND PERSON
ADLS_ACUITY_SCORE: 24
FALL_HISTORY_WITHIN_LAST_SIX_MONTHS: YES

## 2020-05-30 ASSESSMENT — MIFFLIN-ST. JEOR: SCORE: 1838.75

## 2020-05-30 NOTE — PLAN OF CARE
Neuro: A&Ox4. Tingling/Numbness/Burning sensation in hands, feet and calves.  Cardiac: SR. VSS.   Respiratory: Sating 94%+ on RA.  GI/: Adequate urine output. BM X1  Diet/appetite: Tolerating Regular diet. Eating well.  Activity:  Ax1 commode and chair, unsteady gait.  Pain: At acceptable level on current regimen.   Skin: No new deficits noted.  LDA's: PIV, SL.    Plan: Continue with POC. Notify primary team with changes.     COVID test resulted negative. Awaiting MRI, CT pending physician approval. NPO at midnight for abdominal CT.    Time of notification: 3:49 PM  Provider notified:[facility] [[id number]]      All day     [facility] [place] [ Msg Id [id number] ]   Patient status:Hilaria Knight Spoke with charge/ANS. It is your judgment call if patient is HIGH/LOW RISK. You decide if patient is safe to be off precautions and/or can go to procedures. Amanual [id number]  Temp:  [98.6  F (37  C)-99.3  F (37.4  C)] 98.6  F (37  C)  Pulse:  [109-125] 125  Heart Rate:  [103-127] 117  Resp:  [10-16] 16  BP: ()/() 128/84  SpO2:  [99 %-100 %] 100 %  Orders received:  awaiting orders

## 2020-05-30 NOTE — PROGRESS NOTES
Time of notification: 11:27 AM  Provider notified:[facility] [[id number]]      All day     [facility] [ Msg Id [id number] ]    Patient status: Hilaria Bonner Patient has not urinated this shift. Bladder scanned 138. Still no BM. Any updates with plan? Sue HANLEY RN [id number]  Temp:  [98.5  F (36.9  C)-99.3  F (37.4  C)] 98.9  F (37.2  C)  Pulse:  [103-126] 114  Heart Rate:  [103-127] 103  Resp:  [10-18] 16  BP: ()/() 93/60  SpO2:  [98 %-100 %] 100 %  Orders received: awaiting orders

## 2020-05-30 NOTE — ED NOTES
[facility], [place]   ED Nurse to Floor Handoff     Hilaria Bonner is a 29 year old female who speaks English and lives with family members,  in a home  They arrived in the ED by car from home    ED Chief Complaint: Shortness of Breath; Chest Pain; and Numbness    ED Dx;   Final diagnoses:   Paresthesia   Unsteady gait   COVID-19 recent         Needed?: No    Allergies: No Known Allergies.  Past Medical Hx:   Past Medical History:   Diagnosis Date     Acute kidney injury (H) 05/13/2019     Cardiac arrest (H) 05/13/2019     Earache or other ear, nose, or throat complaint 12/15    tyroid     Pulmonary embolus (H) 05/13/2019      Baseline Mental status: WDL  Current Mental Status changes: at basesline, new onset weakness/unsteadiness, slowed speech and word-finding difficulty, R hand tremor, and numbness in hands/legs/mouth    Infection present or suspected this encounter: cultures pending  Sepsis suspected: Yes  Isolation type: Special Precautions-COVID-19     Activity level - Baseline/Home:  Independent  Activity Level - Current:   SBA x1    Bariatric equipment needed?: No    In the ED these meds were given:   Medications   0.9% sodium chloride BOLUS (0 mLs Intravenous Stopped 5/29/20 1605)   0.9% sodium chloride BOLUS (0 mLs Intravenous Stopped 5/29/20 1715)   ketorolac (TORADOL) injection 30 mg (30 mg Intravenous Given 5/29/20 1822)       Drips running?  No    Home pump  No    Current LDAs  Peripheral IV 07/09/19 Left (Active)   Number of days: 325       Peripheral IV 05/29/20 Right Upper forearm (Active)   Site Assessment WDL 05/29/20 1407   Line Status Saline locked 05/29/20 1407   Phlebitis Scale 0-->no symptoms 05/29/20 1407   Number of days: 0       Incision/Surgical Site 03/26/19 Bilateral Abdomen (Active)   Number of days: 430       Labs results:   Labs Ordered and Resulted from Time of ED Arrival Up to the Time of Departure from the ED   COMPREHENSIVE METABOLIC PANEL  - Abnormal; Notable for the following components:       Result Value    Calcium 8.4 (*)     Albumin 2.7 (*)     Protein Total 6.0 (*)     Alkaline Phosphatase 156 (*)     AST 46 (*)     All other components within normal limits   CBC WITH PLATELETS DIFFERENTIAL - Abnormal; Notable for the following components:    RBC Count 2.28 (*)     Hemoglobin 8.8 (*)     Hematocrit 26.4 (*)      (*)     MCH 38.6 (*)     RDW 21.5 (*)     All other components within normal limits   LACTIC ACID WHOLE BLOOD - Abnormal; Notable for the following components:    Lactic Acid 4.7 (*)     All other components within normal limits   LACTIC ACID WHOLE BLOOD - Abnormal; Notable for the following components:    Lactic Acid 2.1 (*)     All other components within normal limits   ISTAT GASES ELEC ICA GLUC WILMA POCT - Abnormal; Notable for the following components:    Glucose 100 (*)     Hemoglobin 9.2 (*)     Hematocrit - POCT 27 (*)     All other components within normal limits   CALCIUM IONIZED WHOLE BLOOD   D DIMER QUANTITATIVE   TROPONIN I   TSH WITH FREE T4 REFLEX   MAGNESIUM   NT PROBNP INPATIENT   PHOSPHORUS   LIPASE   VITAMIN B1 WHOLE BLOOD   COVID-19 VIRUS (CORONAVIRUS) BY PCR   ISTAT CG8 GAS ELEC ICA GLUC WILMA NURSE POCT   CARDIAC CONTINUOUS MONITORING       Imaging Studies:   Recent Results (from the past 24 hour(s))   POC US ECHO LIMITED    Impression    Limited Bedside Cardiac Ultrasound, performed and interpreted by me.   Indication: Chest Pain.  Parasternal long axis, parasternal short axis, apical 4 chamber and subcostal views were acquired.   Image quality was satisfactory.    Findings:    Global left ventricular function appears intact.  Chambers do not appear dilated.  There is no evidence of free fluid within the pericardium.    IMPRESSION: Grossly normal left ventricular function and chamber size.  No pericardial effusion.no dilated RV.        Recent vital signs:   BP (!) 141/102   Pulse 113   Temp 98.5  F (36.9  C)  "(Oral)   Resp 13   Ht 1.676 m (5' 6\")   Wt 106.6 kg (235 lb)   SpO2 99%   BMI 37.93 kg/m      [place] Coma Scale Score: 15 (05/29/20 1504)       Cardiac Rhythm: Tachycardia  Pt needs tele? Yes  Skin/wound Issues: None    Code Status: Full Code    Pain control: fair    Nausea control: pt had none    Abnormal labs/tests/findings requiring intervention: see results    Family present during ED course? No   Family Comments/Social Situation comments:     Tasks needing completion: None    Jose Mcintyre, RN  [phone] [facility]      "

## 2020-05-30 NOTE — PROGRESS NOTES
Admission          5/29/2020  1:53 PM  -----------------------------------------------------------  Reason for admission: increased numbness/tingling BUE and BLE, new tremor to RUE, progressive paresthesias   Primary team notified of pt arrival.  Admitted from: ED   Via: stretcher  Accompanied by: rose RN   Belongings: Placed in closet; valuables remained with patient/sent home with family  Admission Profile: completed  Teaching: orientation to unit and call light- call light within reach, call don't fall, use of console, meal times, when to call for the RN, and enforced importance of safety   Access: PIV  Telemetry:Placed on pt  Ht./Wt.: complete  2 RN Skin Assessment Completed By: mayra rn and dariela rn   Bed surface reassessed with algorithm and charted: yes  New bed surface ordered: no    Pt status:    Temp:  [98.5  F (36.9  C)-99.3  F (37.4  C)] 99.3  F (37.4  C)  Pulse:  [103-126] 115  Heart Rate:  [104-127] 114  Resp:  [10-18] 10  BP: (101-158)/() 158/100  SpO2:  [98 %-100 %] 100 %

## 2020-05-30 NOTE — H&P
"[facility]  Neurology H&P    Patient Name:  Hilaria Bonner  MRN:  [id number]    :  1990  Date of Service:  May 29, 2020  Primary care provider:  Crissy Madrigal      Neurology consultation service was asked to see Hilaria Bonner by Dr. Gibson to evaluate numbness, tingling.    History of Present Illness:   29-year-old female with possible history of PE complicated by cardiac arrest ~1yr ago currently on Xarelto, bariatric surgery and recent COVID infection who presents w/ ~3-4wks progressive paresthesias followed by hyperasthesias and other associated symptoms. History impaired by patient's significant pain in hands and lower legs as well as significant anxiety and tearfulness. Patient was seen yesterday via video visit by Dr. Sher, who was able to perform a more detailed history but in brief:    Patient was diagnosed with COVID-19 around - after developing malaise, generalized fatigue, SOB. Has been socially isolating since. Roughly 3-4wks ago, she developed numbness in her bilateral hands in stocking glove distribution, both hands equally. ~6-7d ago, patient developed significant pain in her hands, as well as pain + numbness in her  Calves and a perioral, oral, tongue and nasal numbness in her. Describes this as tingling, pins-needles, burning. Associated stiffness + cramping frequently + \"band-like\" sensation around her calves. Has felt unsteady on her feet since that time as well, w/ generalized weakness.     Yesterday morning, hyperasthesias spread to feet. Also reports slurring and difficulty speaking since yesterday evening. This morning, experienced muslce spasms in hands, new onset tremor in R arm since awakening. This is notable when she is at rest, can't tell me if it's worse w/ movement. Also has tremor in BLE R>L upon standing. Worsening weakness of BLE and fell today.     No bowel or bladder incontinence. No changes in thinking. No vertigo " or changes in vision or hearing. No sense of taste or smell since COVID infection. No issues with regurgitation but has reported a sore throat that affects her swallow. PRevious cough + SOB, improving, has not checked temperature but is feeling warm. Chest pain - since accident. Worse / constant now. Headache for the past hour, typical headache related to stress (pressure in her middle forehead).    Has recently been found to have a hemoglobin of 9.4, B12 285, elevated iron and was told to stop taking her multivitamin. Trialed on gabapentin for neuropathic pain, did not help, switched to lyrica but still low dose.     Missed B12 injections the past months; had her last injection last week. Family w/ hx of RF in mother + SLE/RF in maternal aunt    ROS  A 10-point ROS was performed. Negative, other than stated per HPI.     PMH  Past Medical History:   Diagnosis Date     Acute kidney injury (H) 05/13/2019     Cardiac arrest (H) 05/13/2019     Earache or other ear, nose, or throat complaint 12/15    tyroid     Pulmonary embolus (H) 05/13/2019       PSH  Past Surgical History:   Procedure Laterality Date     GYN SURGERY      2 C-sections     KNEE SURGERY  most recently - [id number]    3 surgeries in [place]     LAPAROSCOPIC GASTRIC SLEEVE N/A 3/26/2019    Procedure: Laparoscopic Sleeve Gastrectomy;  Surgeon: Luan Lopez MD;  Location: [facility] OR     ORTHOPEDIC SURGERY      2 knee meniscus surgery       Medications   Current Facility-Administered Medications   Medication     cyanocobalamin injection 1,000 mcg     hydrOXYzine (ATARAX) tablet 25 mg     lidocaine (LMX4) cream     medroxyPROGESTERone (DEPO-PROVERA) syringe 150 mg     pregabalin (LYRICA) capsule 50 mg     Current Outpatient Medications   Medication     acetaminophen-codeine (TYLENOL #3) 300-30 MG tablet     Calcium Citrate-Vitamin D 500-500 MG-UNIT CHEW     CHANTIX STARTING MONTH AMADO 0.5 MG X 11 & 1 MG X 42 tablet     diclofenac (VOLTAREN) 1 % topical gel      "fluticasone (FLONASE) 50 MCG/ACT nasal spray     folic acid (FOLVITE) 1 MG tablet     gabapentin (NEURONTIN) 100 MG capsule     gabapentin (NEURONTIN) 300 MG capsule     magic mouthwash (FIRST-MOUTHWASH BLM) compounding kit     Multiple Vitamins-Minerals (MULTIVITAMIN ADULTS PO)     omeprazole (PRILOSEC) 20 MG DR capsule     polyethylene glycol (MIRALAX/GLYCOLAX) powder     potassium chloride ER (KLOR-CON M) 10 MEQ CR tablet     pregabalin (LYRICA) 25 MG capsule     rivaroxaban ANTICOAGULANT (XARELTO ANTICOAGULANT) 20 MG TABS tablet     tiZANidine (ZANAFLEX) 4 MG tablet     topiramate (TOPAMAX) 25 MG tablet     varenicline (CHANTIX AMADO) 0.5 MG X 11 & 1 MG X 42 tablet     vitamin C (ASCORBIC ACID) 1000 MG TABS     Vitamin D, Cholecalciferol, 25 MCG (1000 UT) CAPS     vitamin D3 (CHOLECALCIFEROL) 77892 units (250 mcg) capsule     vitamin D3 (CHOLECALCIFEROL) 2000 units tablet       Allergies  No Known Allergies    Social History  Works as nursing assistant. Smokes, drinks alcohol.    Family History     Family w/ hx of RF in mother + SLE/RF in maternal aunt  Family History   Problem Relation Age of Onset     Diabetes Father      Hypertension Father      Breast Cancer Sister 26        surgery only     Cancer Sister      Coronary Artery Disease Other         paternal aunt     Thyroid Disease Other         neice     Coronary Artery Disease Other      Cerebrovascular Disease Other      Breast Cancer Sister      Thyroid Disease Other      Cerebrovascular Disease No family hx of        Physical Examination   Vitals: /86   Pulse 112   Temp 98.5  F (36.9  C) (Oral)   Resp 11   Ht 1.676 m (5' 6\")   Wt 106.6 kg (235 lb)   SpO2 100%   BMI 37.93 kg/m    General: Adult, distressed, cooperative  HEENT: NC/AT, no scleral icterus, op pink and moist.  Cardiac: Regular rate.  Chest: labored, short breaths at times but no accessory muscle movements.  Abdomen: S/NT/ND  Extremities: No LE swelling.  Skin: No rash or lesion. "   Psych: crying intermittently, extremely anxious.  Neuro:  Mental status: Awake, alert, attentive, oriented. Fund of knowledge is sufficient. Recent and remote memory appear intact. Speech is slowed, labored, tremulous at times. Naming + repeating intact. Comprehension + speech intact. Able to perform complex tasks w/o issue. Able to perform simple calculations (quarters in 2.25).   Cranial nerves: VFI. PERRL. Fundoscopic exam limited due to pupillary size but no overt abnormalities appreciated (discs not clearly seen). Eyes conjugate, EOMI w/o nystagmus, face asymmetric sensation - Decreased on R compared to L in all distributions, Light touch feels like pressure only on bilateral anterior cheeks/nose/mouth/chin w/ returning closer to normal sensation upon moving posteriorly (e.g. length-dependent sensory disturbance).  Palate elevation symmetric. Shoulder shrug strong b/l, tongue/uvula midline. Speech is labored, slowed, tremulous at times but when broken down into specific sounds, able to form these w/o dysarthria.   Motor: resting tremor of RUE seen intermittently throughout exam (higher amplitude, low/variable frequency affecting hand > arm); not suppressible by tactile stimuli but patient can put her hand under her leg and it appears to resolve after time with this. Similar postural component of  tremor is seen in b/l hands upon drift testing. No other abnormal movement appreciated. No increased tone in any extremity. Breakable strength in R deltoid (asymmetric, at least 5- to 5+, possibly explained by pain limitation), otherwise full strength throughout upper extremity w/o asymmetry (including B,T, WE,FE,FF, FAbd). BLE limited moreso by pain - slow to raise HF b/l but upon raising leg, she is able to maintain elevated position w/ full strength. KE+KF, DF + PF 5/5 and symmetric.   Reflexes: 1+ and symmetric at B,T,BR, P. Trace to absent at A.  No bhatia's. No ankle clonus. Toes down-going.  Sensory:  Decreased light touch on R compared to L throughout arm, torso, legs; hyperasthesia distally at wrists down to hands + calves down to feet distal in stocking-glove distribution. Vibration ~10sec at MCP b/l, 7 seconds at hallux b/l, ~10sec at MM; R side feels less than L side. Pinprick diminished in BUE down to mid-forearm/wrist, where it becomes hyperasthetic; PP in proximal legs feels closer to normal but upon moving distally becomes hyperasthetic.  When eyes closed, Finger-nose w/ end point missing on L > R.   Coordination: FNF slowed bilaterally, terminal dysmetria appreciated b/l (slowed more so on R than L), HTS mildly dysmetric more so on R>L (some component of pain-limitation). PIPE slowed b/l.  Gait: unable to test due to significant pain in feet and presenting w/ falls.        Investigations   Lactic acid - 4.7  D-dimer + troponin - negative  Bed side echo - negative  EKG - no troponin elevation or other concerning finding  No WBC    Impression  29-year-old female with possible history of PE complicated by cardiac arrest on Xarelto, bariatric surgery and recent COVID infection who presents w/ ~3-4wks progressive paresthesias followed by hyperasthesias affecting the hands, then calves,feet and perioral region. More recently developed cramping in hands, calves, slowed speech, tremor in RUE+RLE>LLE leading to worsening gait instability and fall. Presented additionally w/ acute on chronic chest pain. Exam pertinent for subjective sensory hyperasthesias of above described regions and R-sided diminished sensation, some e/o of sensory vs. Cerebellar dysfxn w/ preserved strength and reflexes present throughout. NIF's -40, negative cardiac w/u but elevated lactate of unclear etiology, anemia and elevated MCV.     Overall, patient's symptom constellation and clinical scenario prompt further evaluation. Of particular concern, we must rule out  Autoimmune,post-infectious progressive processes - GBS-variants are in the  differential, and there are multiple cases of patient's with recent COVID infection developing GBS-like syndromes; this being said, she describes an atypical progression of symptoms for this enitity. Patient has hx of bariatric surgery and vitamin deficiencies could also cause distal, asymmetric painful peripheral polyneuropathies, which with her having an elevated MCV, missed her last B12 injection/low recent level + lactate of unclear other etiology, this seems to be a very likely cause so we will check vitamin levels and replace empirically. Additionally, with a hx of PE of  undetermined etiology and family hx of RF/SLE, rheumatologic conditions should be evaluated for as well, as these can lead to peripheral neuropathy. Lastly, patient is very anxious, emotionally labile and the majority of her symptoms are subjective and tremor has some functional components. There is likely a psychiatric component that is contributing to her symptoms, although unclear how much and functional causes are a diagnosis of exclusion; therefore, she will need to be further evaluated for previously discussed etiologies.      Plan:  #Progressive Paresthesias, Hyperasthesias, cramps,  #RUE Tremor,   #BLE postural-described gait instability  #Recent COVID infection  #elevated lactate  -Admit to neurology  - bolus + trend lactate  -Repeat COVID testing  - MRI Brain + C-spine wow  - Likely will need LP to r/o GBS but will need to hold rivaroxaban prior  - Vitamin deficiency w/u including vitamin B1 vitamin E, vitamin B6, vitamin B12/MMA, folate,    - We will empirically treat with high-dose IV thiamine  - W/u for rheumatologic causes  - ganglioside GQ1B, although atypical will take a while to come back  -For pain and symptom management, we will attempt to try conservative treatments starting w/ lidocaine cream, increasing pta pregabalin, , pta tizanidine, treating anxiety   -We will consider scheduling oxcarb/carbamaz as well as  amitriptyline  - PT + OT, appreciate recs  - will consider RD consult w/ concern for vitamin deficiency      #Persistent sinus tachycardia  #Chest pain: acute on chronic since PE+cardiac arrest. Negative troponin, d-dimer, bedside ultrasound unremarkable, no leukocytosis, no fever. REportedly compliant on rivaroxaban  - repeat IV bolus   - will discuss role of PE study given hx w/ ED    #Anemia: high iron, low iron binding capacity, ferritin elevated, MCV elevated. No e/o active bleeding.  - trend at this time  - B12/mma pending    #Hx of PE, cardiac arrest  -We will hold rivaroxaban at this time for potential LP,  - mechanical prophylaxis  - will discuss restarting chemoppx tomorrow    Patient was discussed with Dr. Sena.      Prabhjot Ponce  Neurology Resident  Pager [phone]

## 2020-05-30 NOTE — PROGRESS NOTES
"[facility], [place]   Neurology Daily Note  Hilaria Bonner  [id number]  05/30/2020    Subjective:  Complains of severe ongoing pain. Has not had a BM in 4-5 days, attempts this morning on bedpan but says she is unable to have BM or urinate. Miralax given. Bladder scanned for 138 ml. Patient encouraged to drink plenty of fluids today.     Objective   Physical Examination   Vitals: BP (!) 144/86 (BP Location: Left arm)   Pulse 114   Temp 98.7  F (37.1  C) (Oral)   Resp 14   Ht 1.676 m (5' 6\")   Wt 109.7 kg (241 lb 13.5 oz)   SpO2 100%   BMI 39.03 kg/m    General: Adult female patient, lying in bed, crying in pain with exam  HEENT: NC/AT, no icterus, op pink and moist  Cardiac: RRR  Chest: non-labored on RA  Abdomen: S/NT/ND  Extremities: Warm, no edema  Skin: No rash or lesion   Psych: Appears mildly anxious, is intermittently tearful  Neuro:  Mental status: Awake, alert, attentive, oriented to self, time, place, and circumstance. Language is slow and deliberate, but coherent. Intact comprehension of complex commands, naming and repetition. Recent and remote memory intact.  Cranial nerves: VFF, PERRL, conjugate gaze, EOMI, facial sensation intact, face symmetric, shoulder shrug strong, tongue/uvula midline, no dysarthria.   Motor: Normal bulk and tone. Direction-changing tremor of distal RUE; suppressible and distractable. 5/5 strength in 4/4 extremities.   Reflexes: 2+ and symmetric biceps, brachioradialis, triceps. 1 in bilateral patellar and achilles. No clonus, toes down-going.  Sensory: Intact to light touch in bilateral upper extremities, LLE feels \"rough\" to touch and absent sensation in RLE. Intact to vibration in all four extremities, extinguishes in L hand 6 sec, R hand 10 sec, L knee 10 sec, R knee 7 sec, L foot 5 sec, R foot 10 sec. Proprioception absent in big toe of R foot, intact in big toe of L foot.   Gait: Unable to test    Investigations    Recent Labs   Lab Test " 05/30/20  0450 05/29/20  1418 05/29/20  1413    138 139   POTASSIUM 4.3 3.7 3.6   CHLORIDE 113*  --  106   CO2 24  --  25   ANIONGAP 6  --  8   * 100* 96   BUN 12  --  9   CR 0.62  --  0.63   QUINCY 8.0*  --  8.4*     CBC RESULTS:   Recent Labs   Lab Test 05/30/20  0450   WBC 8.5   RBC 2.25*   HGB 8.6*   HCT 26.1*   *   MCH 38.2*   MCHC 33.0   RDW 21.5*   *     INR   Date Value Ref Range Status   05/30/2020 1.57 (H) 0.86 - 1.14 Final      Sed Rate   Date Value Ref Range Status   05/30/2020 33 (H) 0 - 20 mm/h Final     CRP Inflammation   Date Value Ref Range Status   05/29/2020 <2.9 0.0 - 8.0 mg/L Final     TSH   Date Value Ref Range Status   05/29/2020 1.26 0.40 - 4.00 mU/L Final         Assessment and Plan   Hilaria Bonner is a 29 year old year old female h/o morbid obesity, sleeve gastrectomy (3/2019), PE complicated by cardiac arrest (5/2019) now on Xarelto,  And secondary hypoparathyroidism who presented 5/29/2020 with 1 week of neuropathic pain, new RUE tremor, and two days of slowed speech.    #Bilateral leg pain, paresthesias, and cramping  #RUE tremor  #Slowed speech  Differential includes post-infectious autoimmune neuronopathy in the setting of recent COVID-19 infection. Rheumatologic etiology also possible given family history of SLE and RA. Highest suspicion at this point is for vitamin deficiency given history of bariatric surgery, patient not taking folate as prescribed and recent poor PO intake with acute illness. Thiamine deficiency in particular has been associated with paresthesias, hyperesthesia/severe pain, calf tenderness, and hyporeflexia.   - MRI brain and c-spine w/o and w/ contrast  - Follow up vitamin levels: MMA, folate, vitamin B1, vitamin B6, vitamin E  - Follow up rheumatologic/autoimmune labs: ANCA, ANH, lupus anticoagulant, SSA, SSB, ganglioside antibody, GQ1b  - thiamine 500 mg IV TID  - folic acid 1 mg PO daily  - Citracal 1 tablet PO daily  - Thera-vit 1  tablet PO daily  - consider RD consult for management of suspected vitamin deficiency  - increase pregabalin to 75 mg TID  - start topical gabapentin cream  - tramadol 50 mg TID prn  - tizanidine 4 mg at bedtime prn  - PT/OT as tolerated    #Elevated lactate, resolved  Lactic acid 4.7 on admission, etiology unclear. Normalized to 1.3 with IV fluids.    #Elevated total bilirubin  #Elevated alkaline phosphatase  Afebrile and normal WBC make hepatobiliary pathology less likely, but recent uptrend in bilirubin is concerning for cholestatic process.   - RUQ US     #Low UOP  #Tachycardia  Bladder scanned for 138 ml this morning, had last emptied bladder at some point last night. Suspect dehydration, particularly in the setting of recent illness.   - encourage PO hydration  - IV NS @ 75 mL/hr    #Constipation  Last BM 4-5 days ago.  - Miralax  - senna-docusate BID prn    #H/o COVID-19 infection (tested positive on 4/18 or 4/19)  Retested on admission, COVID-19 negative.    #Macrocytic anemia  No evidence of active bleeding. MCV elevated to 116 consistent with megaloblastic anemia likely 2/2 known h/o vitamin B12 deficiency (currently wnl at 502 after recent injection on 5/20). Iron studies notable for high iron, low iron binding capacity, and elevated ferritin likely reflect iron supplementation.    #Chronic chest pain  #H/o cardiac arrest 2/2 PE  Present since cardiac arrest in 5/2019. Takes Tylenol at home which provides inconsistent relief. EKG on admission with sinus tachycardia. Troponin and D-dimer negative. BNP wnl, bedside ultrasound unremarkable. Patient has remained hemodynamically stable.   - EKG and troponin if develops new or worsening symptoms    #H/o PE  Takes Xarelto PTA. Endorses compliance. INR goal 2.0-3.0, was 1.57 on admission.  - Holding PTA Xarelto for lumbar puncture  - recheck INR in AM      FEN: Full diet, NPO at midnight for RUQ UQ  PPx: SCDs, holding PTA Xarelto for lumbar puncture (plan for  Monday 6/1)  Code: FULL    Dispo: Pending clinical course    Patient was seen and discussed with Dr. Sena.    Keysha Steven MD  Neurology PGY-2  [phone]

## 2020-05-30 NOTE — PLAN OF CARE
"Neuro: A&Ox4. Slow, slurred speech. Numbness, tingling and \"burning\" pain in BLE and BUE. RUE and RLE tremor. Anxious and tearful at times- PRN hydroxyzine given x1  Cardiac: ST, HR 115s. VSS. Tmax 99.3  Respiratory: Sating >95% on RA.  GI/: Adequate urine output. No BM, passing flatus, bowel sounds active   Diet/appetite: Tolerating regular diet. Eating well. Good appetite   Activity:  Assist of 2, walker and gait belt to stand and pivot   Pain: BLE pain- PRN tizanidine given x1  Skin: No new deficits noted.  LDA's: R PIV SL     Plan: Continue with POC. Notify primary team with changes.   "

## 2020-05-31 ENCOUNTER — APPOINTMENT (OUTPATIENT)
Dept: PHYSICAL THERAPY | Facility: CLINIC | Age: 30
End: 2020-05-31
Attending: STUDENT IN AN ORGANIZED HEALTH CARE EDUCATION/TRAINING PROGRAM
Payer: COMMERCIAL

## 2020-05-31 ENCOUNTER — APPOINTMENT (OUTPATIENT)
Dept: OCCUPATIONAL THERAPY | Facility: CLINIC | Age: 30
End: 2020-05-31
Attending: STUDENT IN AN ORGANIZED HEALTH CARE EDUCATION/TRAINING PROGRAM
Payer: COMMERCIAL

## 2020-05-31 LAB — INTERPRETATION ECG - MUSE: NORMAL

## 2020-05-31 PROCEDURE — 97535 SELF CARE MNGMENT TRAINING: CPT | Mod: GO | Performed by: OCCUPATIONAL THERAPIST

## 2020-05-31 PROCEDURE — 97530 THERAPEUTIC ACTIVITIES: CPT | Mod: GO | Performed by: OCCUPATIONAL THERAPIST

## 2020-05-31 PROCEDURE — 25000128 H RX IP 250 OP 636: Performed by: PSYCHIATRY & NEUROLOGY

## 2020-05-31 PROCEDURE — 94150 VITAL CAPACITY TEST: CPT

## 2020-05-31 PROCEDURE — 97165 OT EVAL LOW COMPLEX 30 MIN: CPT | Mod: GO | Performed by: OCCUPATIONAL THERAPIST

## 2020-05-31 PROCEDURE — 12000004 ZZH R&B IMCU UMMC

## 2020-05-31 PROCEDURE — 40000556 ZZH STATISTIC PERIPHERAL IV START W US GUIDANCE

## 2020-05-31 PROCEDURE — 97110 THERAPEUTIC EXERCISES: CPT | Mod: GP

## 2020-05-31 PROCEDURE — 97530 THERAPEUTIC ACTIVITIES: CPT | Mod: GP

## 2020-05-31 PROCEDURE — 25000132 ZZH RX MED GY IP 250 OP 250 PS 637: Performed by: STUDENT IN AN ORGANIZED HEALTH CARE EDUCATION/TRAINING PROGRAM

## 2020-05-31 PROCEDURE — 40000275 ZZH STATISTIC RCP TIME EA 10 MIN

## 2020-05-31 PROCEDURE — 97161 PT EVAL LOW COMPLEX 20 MIN: CPT | Mod: GP

## 2020-05-31 PROCEDURE — 25800030 ZZH RX IP 258 OP 636: Performed by: STUDENT IN AN ORGANIZED HEALTH CARE EDUCATION/TRAINING PROGRAM

## 2020-05-31 PROCEDURE — 97116 GAIT TRAINING THERAPY: CPT | Mod: GP

## 2020-05-31 RX ADMIN — TRAMADOL HYDROCHLORIDE 50 MG: 50 TABLET, FILM COATED ORAL at 14:58

## 2020-05-31 RX ADMIN — POLYETHYLENE GLYCOL 3350 17 G: 17 POWDER, FOR SOLUTION ORAL at 09:10

## 2020-05-31 RX ADMIN — FOLIC ACID 1 MG: 1 TABLET ORAL at 09:09

## 2020-05-31 RX ADMIN — HYDROXYZINE HYDROCHLORIDE 25 MG: 25 TABLET ORAL at 23:02

## 2020-05-31 RX ADMIN — ENOXAPARIN SODIUM 40 MG: 40 INJECTION SUBCUTANEOUS at 09:10

## 2020-05-31 RX ADMIN — THIAMINE HCL TAB 100 MG 100 MG: 100 TAB at 09:09

## 2020-05-31 RX ADMIN — PREGABALIN 75 MG: 75 CAPSULE ORAL at 19:28

## 2020-05-31 RX ADMIN — TRAZODONE HYDROCHLORIDE 50 MG: 50 TABLET ORAL at 23:02

## 2020-05-31 RX ADMIN — MINERAL OIL, PETROLATUM, PHENYLEPHRINE HCL: 2.5; 140; 749 OINTMENT TOPICAL at 15:38

## 2020-05-31 RX ADMIN — SODIUM CHLORIDE: 9 INJECTION, SOLUTION INTRAVENOUS at 23:32

## 2020-05-31 RX ADMIN — OXYCODONE HYDROCHLORIDE AND ACETAMINOPHEN 1000 MG: 500 TABLET ORAL at 09:09

## 2020-05-31 RX ADMIN — POTASSIUM CHLORIDE 10 MEQ: 750 TABLET, EXTENDED RELEASE ORAL at 19:27

## 2020-05-31 RX ADMIN — Medication 1 TABLET: at 09:09

## 2020-05-31 RX ADMIN — PREGABALIN 75 MG: 75 CAPSULE ORAL at 09:09

## 2020-05-31 RX ADMIN — MULTIPLE VITAMINS W/ MINERALS TAB 1 TABLET: TAB at 09:09

## 2020-05-31 RX ADMIN — MELATONIN 1000 UNITS: at 09:09

## 2020-05-31 RX ADMIN — POTASSIUM CHLORIDE 10 MEQ: 750 TABLET, EXTENDED RELEASE ORAL at 09:09

## 2020-05-31 RX ADMIN — Medication 1 TABLET: at 19:27

## 2020-05-31 RX ADMIN — TIZANIDINE 4 MG: 4 TABLET ORAL at 23:02

## 2020-05-31 RX ADMIN — PREGABALIN 75 MG: 75 CAPSULE ORAL at 13:35

## 2020-05-31 RX ADMIN — OXCARBAZEPINE 150 MG: 150 TABLET, FILM COATED ORAL at 09:10

## 2020-05-31 ASSESSMENT — ACTIVITIES OF DAILY LIVING (ADL)
ADLS_ACUITY_SCORE: 24
ADLS_ACUITY_SCORE: 24
IADL_COMMENTS: OT: PT WAS IND
ADLS_ACUITY_SCORE: 22
ADLS_ACUITY_SCORE: 24

## 2020-05-31 NOTE — PLAN OF CARE
Neuro: Neuros unchanged. A&Ox4. Slower speech.  Numbness and tingling to BLE/BUE. Intermitt tremors to R hand.   Cardiac: ST. VSS.        Respiratory: Sating 98% on RA.  GI/: Adequate urine output. BM X3, hemorrhoids noted, Preparation H applied and Tucks pads used. Kg blood streaks noted in stool.   Diet/appetite: Reg diet, appetite fair. States no smell/taste currently, and fills easily d/t hx of bariatric surgery  Activity:  Assist of 1 or stand by up to chair and commode  Pain: Sched lyrica, ultram x 1 for R hand stiffness.   Skin: No new deficits noted.  LDA's: LUE PIV      Plan: Continue with POC. Notify primary team with changes.

## 2020-05-31 NOTE — PLAN OF CARE
Discharge Planner OT   Patient plan for discharge: home  Current status: OT eval completed. OT educated pt on tremor mgmt w/in ADLS, pt CGA to stand pivot transfer, pt max A LB washing and min A UB dressing  Barriers to return to prior living situation: medical status  Recommendations for discharge: ARU  Rationale for recommendations: Pt is motivated, has a good support system and is making good progress with therapy. Pt is able to tolerate 3 hours of therapy per day.          Entered by: Yamile Guzman 05/31/2020 10:27 AM

## 2020-05-31 NOTE — PLAN OF CARE
Discharge Planner PT   Patient plan for discharge: not discussed today     Current status: PT eval completed, tx indicated. Pt performed STS and SPT with CGA-min A. Completed self cares on BSC with supervision and A for set up. She ambulated ~40' with FWW and min A. Significantly dec gait speed and step length. Intermittently catching R foot. Performed seated and standing TE to promote LE strengthening and activity tolerance progression. AVSS on RA.     Barriers to return to prior living situation: medical needs, current mobility, level of A    Recommendations for discharge: ARU     Rationale for recommendations: Pt is below baseline, she has interdisciplinary needs, would tolerate extended therapy times, is motivated and has supportive family.        Entered by: Jordyn Mcgee 05/31/2020 1:44 PM

## 2020-05-31 NOTE — PROGRESS NOTES
05/31/20 0800   Quick Adds   Type of Visit Initial Occupational Therapy Evaluation   Living Environment   Lives With child(lavell), dependent  (one older child, helps pt, younger child is ind at home)   Living Arrangements apartment   Home Accessibility stairs to enter home;stairs within home   Number of Stairs, Main Entrance 1   Number of Stairs, Within Home, Primary 8   Transportation Anticipated family or friend will provide   Living Environment Comment OT: Pt's mother helps pt throughout the day   Functional Level   Ambulation 1-->assistive equipment   Transferring 1-->assistive equipment   Toileting 3-->assistive equipment and person   Bathing 3-->assistive equipment and person   Dressing 2-->assistive person   Eating 0-->independent   Communication 0-->understands/communicates without difficulty   Swallowing 0-->swallows foods/liquids without difficulty   Cognition 0 - no cognition issues reported   Fall history within last six months yes   Number of times patient has fallen within last six months 2   Which of the above functional risks had a recent onset or change? ambulation;transferring;toileting;bathing;dressing;fall history   General Information   Onset of Illness/Injury or Date of Surgery - Date 05/29/20   Referring Physician  Alan Ponce MD     Patient/Family Goals Statement to discharge home   Additional Occupational Profile Info/Pertinent History of Current Problem Hilaria Bonner is a 29 year old year old female h/o morbid obesity, sleeve gastrectomy (3/2019), PE complicated by cardiac arrest (5/2019) now on Xarelto,  And secondary hypoparathyroidism who presented 5/29/2020 with 1 week of neuropathic pain, new RUE tremor, and two days of slowed speech.   Precautions/Limitations fall precautions   Cognitive Status Examination   Orientation orientation to person, place and time   Visual Perception   Visual Perception No deficits were identified   Sensory Examination   Sensory Comments OT: BUE/BLE  "tingling and pain   Range of Motion (ROM)   ROM Comment OT: BUE WFL   Strength   Strength Comments OT: BUE 5/5 MMT   Hand Strength   Hand Strength Comments OT: bilateral weakened    Muscle Tone Assessment   Muscle Tone Comments OT: overall deconditioned   Coordination   Coordination Comments OT: Decreased coordination Rs>L 2/2 Rt tremors   Mobility   Bed Mobility Comments OT: mod A   Transfer Skills   Transfer Comments OT: CGA   Toilet Transfer   Toilet Transfer Comments OT: CGA   Balance   Balance Comments OT; CGA pivot transfer, SBA sitting EOB   Toileting   Level of Bishop: Toilet   (total A garfield cares)   Instrumental Activities of Daily Living (IADL)   IADL Comments OT: Pt was ind   Activities of Daily Living Analysis   Impairments Contributing to Impaired Activities of Daily Living balance impaired;coordination impaired;strength decreased;sensation decreased;pain   General Therapy Interventions   Planned Therapy Interventions ADL retraining;IADL retraining;balance training;bed mobility training;cognition;fine motor coordination training;strengthening;transfer training;home program guidelines;progressive activity/exercise   Clinical Impression   Criteria for Skilled Therapeutic Interventions Met yes, treatment indicated   OT Diagnosis decreased ind in ADLS/IADLS   Influenced by the following impairments generalized weakness and pain   Assessment of Occupational Performance 1-3 Performance Deficits   Identified Performance Deficits decreased ind in ADLS/IADLS   Clinical Decision Making (Complexity) Low complexity   Therapy Frequency Daily   Predicted Duration of Therapy Intervention (days/wks) 6/25/20   Anticipated Discharge Disposition Acute Rehabilitation Facility   Risks and Benefits of Treatment have been explained. Yes   Patient, Family & other staff in agreement with plan of care Yes   [facility] AM-PAC  \"6 Clicks\" Daily Activity Inpatient Short Form   1. Putting on and taking off regular " lower body clothing? 2 - A Lot   2. Bathing (including washing, rinsing, drying)? 2 - A Lot   3. Toileting, which includes using toilet, bedpan or urinal? 2 - A Lot   4. Putting on and taking off regular upper body clothing? 2 - A Lot   5. Taking care of personal grooming such as brushing teeth? 3 - A Little   6. Eating meals? 3 - A Little   Daily Activity Raw Score (Score out of 24.Lower scores equate to lower levels of function) 14   Total Evaluation Time   Total Evaluation Time (Minutes) 5

## 2020-05-31 NOTE — PROGRESS NOTES
"[facility], [place]   Neurology Daily Note  Hilaria Bonner  [id number]  05/31/2020    Subjective:  No acute events overnight. Reports that her pain improved with the prescribed therapies but are still present. She thinks she needs to give it a little more time. Otherwise no new complaints.    Objective   Physical Examination   Vitals: BP (!) 120/95 (BP Location: Left arm)   Pulse 112   Temp 98.6  F (37  C) (Oral)   Resp 18   Ht 1.676 m (5' 6\")   Wt 109.7 kg (241 lb 13.5 oz)   SpO2 98%   BMI 39.03 kg/m       General: Adult female patient, lying in bed   HEENT: NC/AT, no icterus, op pink and moist  Cardiac: Tachycardic  Chest: non-labored on RA    Neuro:  Mental status: Awake, alert, attentive, oriented to self, time, place, and circumstance. Language is slow and deliberate, but coherent. Intact comprehension of complex commands, naming and repetition.    Cranial nerves: VFF, conjugate gaze, EOMI, face symmetric, shoulder shrug strong, tongue/uvula midline, no dysarthria.   Motor: Normal bulk and tone. Direction-changing tremor of distal RUE; suppressible and distractable. 5/5 strength in 4/4 extremities.   Reflexes: 2+ and symmetric biceps, brachioradialis, triceps. 1 in bilateral patellar and achilles. No clonus.   Sensory: Deferred  Gait: Unable to test    Investigations    Recent Labs   Lab Test 05/30/20  0450 05/29/20  1418 05/29/20  1413    138 139   POTASSIUM 4.3 3.7 3.6   CHLORIDE 113*  --  106   CO2 24  --  25   ANIONGAP 6  --  8   * 100* 96   BUN 12  --  9   CR 0.62  --  0.63   QUINCY 8.0*  --  8.4*     CBC RESULTS:   Recent Labs   Lab Test 05/30/20  0450   WBC 8.5   RBC 2.25*   HGB 8.6*   HCT 26.1*   *   MCH 38.2*   MCHC 33.0   RDW 21.5*   *     INR   Date Value Ref Range Status   05/30/2020 1.57 (H) 0.86 - 1.14 Final      Sed Rate   Date Value Ref Range Status   05/30/2020 33 (H) 0 - 20 mm/h Final     CRP Inflammation   Date Value Ref Range " Status   05/29/2020 <2.9 0.0 - 8.0 mg/L Final     TSH   Date Value Ref Range Status   05/29/2020 1.26 0.40 - 4.00 mU/L Final         Assessment and Plan   Hilaria Bonner is a 29 year old year old female h/o morbid obesity, sleeve gastrectomy (3/2019), PE complicated by cardiac arrest (5/2019) now on Xarelto,  And secondary hypoparathyroidism who presented 5/29/2020 with 1 week of neuropathic pain, new RUE tremor, and two days of slowed speech.    #Bilateral leg pain, paresthesias, and cramping  #RUE tremor  #Slowed speech  Differential includes post-infectious autoimmune neuronopathy in the setting of recent COVID-19 infection. Rheumatologic etiology also possible given family history of SLE and RA. Highest suspicion at this point is for vitamin deficiency given history of bariatric surgery, patient not taking folate as prescribed and recent poor PO intake with acute illness. Thiamine deficiency in particular has been associated with paresthesias, hyperesthesia/severe pain, calf tenderness, and hyporeflexia.   - MRI brain and c-spine w/o and w/ contrast negative  - Follow up vitamin levels: MMA, folate, vitamin B1, vitamin B6, vitamin E  - Follow up rheumatologic/autoimmune labs: ANCA, ANH, lupus anticoagulant, SSA, SSB, ganglioside antibody, GQ1b  - thiamine 500 mg IV TID  - folic acid 1 mg PO daily  - Citracal 1 tablet PO daily  - Thera-vit 1 tablet PO daily  - consider RD consult for management of suspected vitamin deficiency  - increase pregabalin to 75 mg TID  - start topical gabapentin cream  - tramadol 50 mg TID prn  - tizanidine 4 mg at bedtime prn  - PT/OT as tolerated    #Elevated lactate, resolved  Lactic acid 4.7 on admission, etiology unclear. Normalized to 1.3 with IV fluids.    #Elevated total bilirubin  #Elevated alkaline phosphatase  Afebrile and normal WBC make hepatobiliary pathology less likely, but recent uptrend in bilirubin is concerning for cholestatic process.   - RUQ US with possible  hepatic steatosis  - Repeat CMP tomorrow    #Low UOP  #Tachycardia  Bladder scanned for 138 ml this morning, had last emptied bladder at some point last night. Suspect dehydration, particularly in the setting of recent illness.   - encourage PO hydration  - IV NS @ 75 mL/hr    #Constipation, improved  Two stools charted yesterday and one today.  - Miralax  - senna-docusate BID prn    #H/o COVID-19 infection (tested positive on 4/18 or 4/19)  Retested on admission, COVID-19 negative.    #Macrocytic anemia  No evidence of active bleeding. MCV elevated to 116 consistent with megaloblastic anemia likely 2/2 known h/o vitamin B12 deficiency (currently wnl at 502 after recent injection on 5/20). Iron studies notable for high iron, low iron binding capacity, and elevated ferritin likely reflect iron supplementation.    #Chronic chest pain  #H/o cardiac arrest 2/2 PE  Present since cardiac arrest in 5/2019. Takes Tylenol at home which provides inconsistent relief. EKG on admission with sinus tachycardia. Troponin and D-dimer negative. BNP wnl, bedside ultrasound unremarkable. Patient has remained hemodynamically stable.   - EKG and troponin if develops new or worsening symptoms    #H/o PE  Takes Xarelto PTA. Endorses compliance. INR goal 2.0-3.0, was 1.57 on admission.  - Holding PTA Xarelto for lumbar puncture  - recheck INR in AM  - CT PE protocol completed yesterday and negative for PE      FEN: Full diet  PPx: SCDs, holding PTA Xarelto for lumbar puncture (plan for Monday 6/1)  Code: FULL    Dispo: Pending clinical course    Patient was seen and discussed with Dr. Sena.    Praneeth Bailey MD  Neurology PGY-4

## 2020-05-31 NOTE — PROGRESS NOTES
05/31/20 1100   Quick Adds   Type of Visit Initial PT Evaluation  (Jordyn Mcgee, PT, DPT )       Present no   Living Environment   Lives With child(lavell), dependent  (10 year old and 3 year old )   Living Arrangements apartment   Home Accessibility stairs to enter home   Number of Stairs, Main Entrance 1   Number of Stairs, Within Home, Primary 8   Transportation Anticipated family or friend will provide   Living Environment Comment Pt lives in apartment with 2 children. Their dad is involved but does not live with them. parents are helpin with children while pt is hospilaized    Self-Care   Usual Activity Tolerance good   Current Activity Tolerance moderate   Regular Exercise No   Equipment Currently Used at Home none   Activity/Exercise/Self-Care Comment Prior to initial onset of weakness pt was indep, notes that in the last 2 days she has required more A for some ADLs/mobility    Functional Level Prior   Ambulation 1-->assistive equipment  (FWW)   Transferring 1-->assistive equipment  (FWW)   Toileting 3-->assistive equipment and person   Bathing 3-->assistive equipment and person   Communication 0-->understands/communicates without difficulty   Swallowing 0-->swallows foods/liquids without difficulty   Cognition 0 - no cognition issues reported   Fall history within last six months yes   Number of times patient has fallen within last six months 2   Which of the above functional risks had a recent onset or change? ambulation;transferring;fall history   Prior Functional Level Comment started using a walker for 2 days prior to admission. was getting more and more unsteady    General Information   Onset of Illness/Injury or Date of Surgery - Date 05/29/20   Referring Physician Alan Ponce MD   Patient/Family Goals Statement return home    Pertinent History of Current Problem (include personal factors and/or comorbidities that impact the POC) 29 year old year old female h/o morbid  "obesity, sleeve gastrectomy (3/2019), PE complicated by cardiac arrest (5/2019) now on Xarelto,  And secondary hypoparathyroidism who presented 5/29/2020 with 1 week of neuropathic pain, new RUE tremor, and two days of slowed speech.   Precautions/Limitations fall precautions   General Info Comments activity: up with A    Cognitive Status Examination   Orientation orientation to person, place and time   Level of Consciousness alert   Follows Commands and Answers Questions 100% of the time   Personal Safety and Judgment intact   Memory intact   Pain Assessment   Patient Currently in Pain No   Integumentary/Edema   Integumentary/Edema no deficits were identifed   Posture    Posture Not impaired   Range of Motion (ROM)   ROM Comment WFL    Strength   Strength Comments gross deconditioning, 3-4/5 nima UE and LEs    Bed Mobility   Bed Mobility Comments sit>supine with supervision via log roll    Transfer Skills   Transfer Comments STS with and without AD, CGA-SBA   Gait   Gait Comments with FWW, CGA, dec gait speed, intermittent R toe catching. significantly dec speed and step length. heavy reliance on FWW   Balance   Balance Comments not formally assessed, pt is falls risk dt weakness and medical status    Sensory Examination   Sensory Perception Comments pt describes pin and needles on R side and \"feathery\" feeling on L side (LEs)   Coordination   Coordination no deficits were identified   Muscle Tone   Muscle Tone no deficits were identified   General Therapy Interventions   Planned Therapy Interventions ADL retraining;balance training;bed mobility training;gait training;stretching;strengthening;transfer training;progressive activity/exercise;home program guidelines;risk factor education   Clinical Impression   Criteria for Skilled Therapeutic Intervention yes, treatment indicated   PT Diagnosis dec functional mobility    Influenced by the following impairments pain, fatigue, deconditioning,    Functional limitations " "due to impairments bed mobility, transfers, gait, stairs, activity tolerance, balance    Clinical Presentation Evolving/Changing   Clinical Presentation Rationale PMH, clinical judgement, current mobility    Clinical Decision Making (Complexity) Moderate complexity   Therapy Frequency 6x/week   Predicted Duration of Therapy Intervention (days/wks) days   Anticipated Equipment Needs at Discharge   (TBD)   Anticipated Discharge Disposition Acute Rehabilitation Facility;Home with Assist   Risk & Benefits of therapy have been explained Yes   Patient, Family & other staff in agreement with plan of care Yes   Clinical Impression Comments PT eval completed, tx indicated    [facility] AM-PAC TM \"6 Clicks\"   2016, Trustees of [facility], under license to [employer].  All rights reserved.   6 Clicks Short Forms Basic Mobility Inpatient Short Form   [facility] AM-PAC  \"6 Clicks\" V.2 Basic Mobility Inpatient Short Form   1. Turning from your back to your side while in a flat bed without using bedrails? 4 - None   2. Moving from lying on your back to sitting on the side of a flat bed without using bedrails? 3 - A Little   3. Moving to and from a bed to a chair (including a wheelchair)? 3 - A Little   4. Standing up from a chair using your arms (e.g., wheelchair, or bedside chair)? 3 - A Little   5. To walk in hospital room? 3 - A Little   6. Climbing 3-5 steps with a railing? 3 - A Little   Basic Mobility Raw Score (Score out of 24.Lower scores equate to lower levels of function) 19   Total Evaluation Time   Total Evaluation Time (Minutes) 9     "

## 2020-05-31 NOTE — PLAN OF CARE
"BP (!) 140/105 (BP Location: Left arm)   Pulse 112   Temp 97.7  F (36.5  C) (Oral)   Resp 16   Ht 1.676 m (5' 6\")   Wt 109.7 kg (241 lb 13.5 oz)   SpO2 100%   BMI 39.03 kg/m      Time: 5801-5505  Status: admitted on 5/29/20 with~3-4 wks increasing paresthesias and hyperstethias.   Neuro:  A&Ox4, slow to respond, mild slurred speech, numbness and tingling to bilateral UE and LE and generalized weakness.   Activity: One assist to bedside commode/chair.   Pain: c/o burning/tingling and tightness to bilateral wrist. Tramadol 50 mg helped a little. Overall, her pain is calmed per pt report.   Cardiac: sinus tachycardia, asymptomatic. -120s. Vs stable with slight elevated BP.   Respiratory: denied SOB or chest pain. RA, LS clear/diminished to bases.   GI/: last bm today. Active bowel sound and passing flatus. Voided using bedside commode.   Diet: regular, tolerated well and good appetite.   Skin: no skin deficit.   LDAs: PIV infusing NS 75 ml/hr.   Labs/Imaging: did US, CT and MRI.   New change this shift: MRI, CT and abd US, NS 75 ml/hr new order.   Plan: NPO after midnight  Possible LP on Monday per note.     "

## 2020-05-31 NOTE — PLAN OF CARE
"BP (!) 143/88 (BP Location: Left arm)   Pulse 112   Temp 98.6  F (37  C) (Oral)   Resp 17   Ht 1.676 m (5' 6\")   Wt 109.7 kg (241 lb 13.5 oz)   SpO2 99%   BMI 39.03 kg/m      Neuro: A&Ox4. Slow to respond with mild slurred speech. Numbness and tingling to BLE/BUE. Tremors to both hands. Unsteady on her feet due to numbness/tingling. Pt stated this improved with night medications given.  Cardiac: ST. VSS.   Respiratory: Sating 98% on RA.  GI/: Adequate urine output. BM X1  Diet/appetite: Tolerating NPO diet  Activity:  Assist of 1 or stand by up to chair and commode  Pain: At acceptable level on current regimen.   Skin: No new deficits noted.  LDA's: Right upper arm PIV    Plan: Continue with POC. Notify primary team with changes.    "

## 2020-06-01 ENCOUNTER — APPOINTMENT (OUTPATIENT)
Dept: CT IMAGING | Facility: CLINIC | Age: 30
End: 2020-06-01
Attending: STUDENT IN AN ORGANIZED HEALTH CARE EDUCATION/TRAINING PROGRAM
Payer: COMMERCIAL

## 2020-06-01 ENCOUNTER — APPOINTMENT (OUTPATIENT)
Dept: PHYSICAL THERAPY | Facility: CLINIC | Age: 30
End: 2020-06-01
Payer: COMMERCIAL

## 2020-06-01 LAB
A-TOCOPHEROL VIT E SERPL-MCNC: 8.9 MG/L (ref 5.5–18)
ALBUMIN SERPL-MCNC: 2.3 G/DL (ref 3.4–5)
ALP SERPL-CCNC: 129 U/L (ref 40–150)
ALT SERPL W P-5'-P-CCNC: 26 U/L (ref 0–50)
ANION GAP SERPL CALCULATED.3IONS-SCNC: 7 MMOL/L (ref 3–14)
AST SERPL W P-5'-P-CCNC: 24 U/L (ref 0–45)
BETA+GAMMA TOCOPHEROL SERPL-MCNC: 0.7 MG/L (ref 0–6)
BILIRUB SERPL-MCNC: 0.4 MG/DL (ref 0.2–1.3)
BUN SERPL-MCNC: 6 MG/DL (ref 7–30)
CALCIUM SERPL-MCNC: 8.1 MG/DL (ref 8.5–10.1)
CHLORIDE SERPL-SCNC: 112 MMOL/L (ref 94–109)
CO2 SERPL-SCNC: 23 MMOL/L (ref 20–32)
CREAT SERPL-MCNC: 0.47 MG/DL (ref 0.52–1.04)
DSDNA AB SER-ACNC: 1 IU/ML
ENA SS-A IGG SER IA-ACNC: <0.2 AI (ref 0–0.9)
ENA SS-B IGG SER IA-ACNC: <0.2 AI (ref 0–0.9)
GFR SERPL CREATININE-BSD FRML MDRD: >90 ML/MIN/{1.73_M2}
GLUCOSE BLDC GLUCOMTR-MCNC: 114 MG/DL (ref 70–99)
GLUCOSE BLDC GLUCOMTR-MCNC: 89 MG/DL (ref 70–99)
GLUCOSE SERPL-MCNC: 82 MG/DL (ref 70–99)
INTERPRETATION ECG - MUSE: NORMAL
LA PPP-IMP: ABNORMAL
POTASSIUM SERPL-SCNC: 3.8 MMOL/L (ref 3.4–5.3)
PROT SERPL-MCNC: 5.3 G/DL (ref 6.8–8.8)
RHEUMATOID FACT SER NEPH-ACNC: <7 IU/ML (ref 0–20)
SODIUM SERPL-SCNC: 142 MMOL/L (ref 133–144)
VIT B6 SERPL-MCNC: 22 NMOL/L (ref 20–125)

## 2020-06-01 PROCEDURE — 25000132 ZZH RX MED GY IP 250 OP 250 PS 637: Performed by: STUDENT IN AN ORGANIZED HEALTH CARE EDUCATION/TRAINING PROGRAM

## 2020-06-01 PROCEDURE — 12000001 ZZH R&B MED SURG/OB UMMC

## 2020-06-01 PROCEDURE — 40000275 ZZH STATISTIC RCP TIME EA 10 MIN

## 2020-06-01 PROCEDURE — 40000556 ZZH STATISTIC PERIPHERAL IV START W US GUIDANCE

## 2020-06-01 PROCEDURE — 75574 CT ANGIO HRT W/3D IMAGE: CPT | Mod: 26 | Performed by: INTERNAL MEDICINE

## 2020-06-01 PROCEDURE — 75574 CT ANGIO HRT W/3D IMAGE: CPT

## 2020-06-01 PROCEDURE — 25000128 H RX IP 250 OP 636: Performed by: PSYCHIATRY & NEUROLOGY

## 2020-06-01 PROCEDURE — 25000125 ZZHC RX 250: Performed by: INTERNAL MEDICINE

## 2020-06-01 PROCEDURE — 009U3ZX DRAINAGE OF SPINAL CANAL, PERCUTANEOUS APPROACH, DIAGNOSTIC: ICD-10-PCS | Performed by: RADIOLOGY

## 2020-06-01 PROCEDURE — 80053 COMPREHEN METABOLIC PANEL: CPT | Performed by: STUDENT IN AN ORGANIZED HEALTH CARE EDUCATION/TRAINING PROGRAM

## 2020-06-01 PROCEDURE — 00000146 ZZHCL STATISTIC GLUCOSE BY METER IP

## 2020-06-01 PROCEDURE — 97116 GAIT TRAINING THERAPY: CPT | Mod: GP

## 2020-06-01 PROCEDURE — 36415 COLL VENOUS BLD VENIPUNCTURE: CPT | Performed by: STUDENT IN AN ORGANIZED HEALTH CARE EDUCATION/TRAINING PROGRAM

## 2020-06-01 PROCEDURE — 93005 ELECTROCARDIOGRAM TRACING: CPT

## 2020-06-01 PROCEDURE — 94150 VITAL CAPACITY TEST: CPT

## 2020-06-01 PROCEDURE — 97530 THERAPEUTIC ACTIVITIES: CPT | Mod: GP

## 2020-06-01 PROCEDURE — 93010 ELECTROCARDIOGRAM REPORT: CPT | Performed by: INTERNAL MEDICINE

## 2020-06-01 PROCEDURE — 97110 THERAPEUTIC EXERCISES: CPT | Mod: GP

## 2020-06-01 PROCEDURE — 25000132 ZZH RX MED GY IP 250 OP 250 PS 637: Performed by: INTERNAL MEDICINE

## 2020-06-01 RX ORDER — ONDANSETRON 2 MG/ML
4 INJECTION INTRAMUSCULAR; INTRAVENOUS
Status: DISCONTINUED | OUTPATIENT
Start: 2020-06-01 | End: 2020-06-05 | Stop reason: HOSPADM

## 2020-06-01 RX ORDER — NITROGLYCERIN 0.4 MG/1
.4-.8 TABLET SUBLINGUAL
Status: DISCONTINUED | OUTPATIENT
Start: 2020-06-01 | End: 2020-06-04

## 2020-06-01 RX ORDER — IVABRADINE 5 MG/1
10 TABLET, FILM COATED ORAL ONCE
Status: COMPLETED | OUTPATIENT
Start: 2020-06-01 | End: 2020-06-01

## 2020-06-01 RX ORDER — DIPHENHYDRAMINE HCL 25 MG
25 CAPSULE ORAL
Status: DISCONTINUED | OUTPATIENT
Start: 2020-06-01 | End: 2020-06-01

## 2020-06-01 RX ORDER — DIPHENHYDRAMINE HYDROCHLORIDE 50 MG/ML
25-50 INJECTION INTRAMUSCULAR; INTRAVENOUS
Status: DISCONTINUED | OUTPATIENT
Start: 2020-06-01 | End: 2020-06-05 | Stop reason: HOSPADM

## 2020-06-01 RX ORDER — METOPROLOL TARTRATE 50 MG
100 TABLET ORAL ONCE
Status: COMPLETED | OUTPATIENT
Start: 2020-06-01 | End: 2020-06-01

## 2020-06-01 RX ORDER — METHYLPREDNISOLONE SODIUM SUCCINATE 125 MG/2ML
125 INJECTION, POWDER, LYOPHILIZED, FOR SOLUTION INTRAMUSCULAR; INTRAVENOUS
Status: DISCONTINUED | OUTPATIENT
Start: 2020-06-01 | End: 2020-06-05 | Stop reason: HOSPADM

## 2020-06-01 RX ORDER — METOPROLOL TARTRATE 1 MG/ML
5-15 INJECTION, SOLUTION INTRAVENOUS
Status: DISCONTINUED | OUTPATIENT
Start: 2020-06-01 | End: 2020-06-05 | Stop reason: HOSPADM

## 2020-06-01 RX ORDER — IOPAMIDOL 755 MG/ML
120 INJECTION, SOLUTION INTRAVASCULAR ONCE
Status: COMPLETED | OUTPATIENT
Start: 2020-06-01 | End: 2020-06-01

## 2020-06-01 RX ORDER — PREGABALIN 50 MG/1
100 CAPSULE ORAL 3 TIMES DAILY
Status: DISCONTINUED | OUTPATIENT
Start: 2020-06-01 | End: 2020-06-04

## 2020-06-01 RX ORDER — METOPROLOL TARTRATE 50 MG
50-100 TABLET ORAL
Status: COMPLETED | OUTPATIENT
Start: 2020-06-01 | End: 2020-06-01

## 2020-06-01 RX ORDER — ACYCLOVIR 200 MG/1
0-1 CAPSULE ORAL
Status: DISCONTINUED | OUTPATIENT
Start: 2020-06-01 | End: 2020-06-05 | Stop reason: HOSPADM

## 2020-06-01 RX ADMIN — Medication 1 TABLET: at 19:00

## 2020-06-01 RX ADMIN — PREGABALIN 100 MG: 50 CAPSULE ORAL at 14:23

## 2020-06-01 RX ADMIN — HYDROXYZINE HYDROCHLORIDE 25 MG: 25 TABLET ORAL at 21:44

## 2020-06-01 RX ADMIN — METOPROLOL TARTRATE 100 MG: 50 TABLET, FILM COATED ORAL at 10:08

## 2020-06-01 RX ADMIN — FOLIC ACID 1 MG: 1 TABLET ORAL at 07:53

## 2020-06-01 RX ADMIN — PREGABALIN 100 MG: 50 CAPSULE ORAL at 19:00

## 2020-06-01 RX ADMIN — ACETAMINOPHEN 650 MG: 325 TABLET, FILM COATED ORAL at 14:23

## 2020-06-01 RX ADMIN — MULTIPLE VITAMINS W/ MINERALS TAB 1 TABLET: TAB at 07:53

## 2020-06-01 RX ADMIN — IVABRADINE 10 MG: 5 TABLET, FILM COATED ORAL at 11:33

## 2020-06-01 RX ADMIN — Medication 1 TABLET: at 07:54

## 2020-06-01 RX ADMIN — POTASSIUM CHLORIDE 10 MEQ: 750 TABLET, EXTENDED RELEASE ORAL at 19:00

## 2020-06-01 RX ADMIN — HYDROXYZINE HYDROCHLORIDE 25 MG: 25 TABLET ORAL at 14:23

## 2020-06-01 RX ADMIN — PREGABALIN 75 MG: 75 CAPSULE ORAL at 07:57

## 2020-06-01 RX ADMIN — TRAMADOL HYDROCHLORIDE 50 MG: 50 TABLET, FILM COATED ORAL at 10:09

## 2020-06-01 RX ADMIN — METOPROLOL TARTRATE 50 MG: 50 TABLET, FILM COATED ORAL at 12:28

## 2020-06-01 RX ADMIN — ACETAMINOPHEN 650 MG: 325 TABLET, FILM COATED ORAL at 07:54

## 2020-06-01 RX ADMIN — DICLOFENAC 2 G: 10 GEL TOPICAL at 21:44

## 2020-06-01 RX ADMIN — POTASSIUM CHLORIDE 10 MEQ: 750 TABLET, EXTENDED RELEASE ORAL at 07:54

## 2020-06-01 RX ADMIN — HYDROXYZINE HYDROCHLORIDE 25 MG: 25 TABLET ORAL at 07:54

## 2020-06-01 RX ADMIN — IOPAMIDOL 120 ML: 755 INJECTION, SOLUTION INTRAVENOUS at 13:24

## 2020-06-01 RX ADMIN — Medication 1 G: at 20:06

## 2020-06-01 RX ADMIN — NITROGLYCERIN 0.8 MG: 0.4 TABLET, ORALLY DISINTEGRATING SUBLINGUAL at 13:36

## 2020-06-01 RX ADMIN — TRAMADOL HYDROCHLORIDE 50 MG: 50 TABLET, FILM COATED ORAL at 02:45

## 2020-06-01 RX ADMIN — OXYCODONE HYDROCHLORIDE AND ACETAMINOPHEN 1000 MG: 500 TABLET ORAL at 07:54

## 2020-06-01 RX ADMIN — THIAMINE HCL TAB 100 MG 100 MG: 100 TAB at 07:53

## 2020-06-01 RX ADMIN — ACETAMINOPHEN 650 MG: 325 TABLET, FILM COATED ORAL at 18:59

## 2020-06-01 RX ADMIN — TRAMADOL HYDROCHLORIDE 50 MG: 50 TABLET, FILM COATED ORAL at 16:21

## 2020-06-01 RX ADMIN — MELATONIN 1000 UNITS: at 07:53

## 2020-06-01 RX ADMIN — METOPROLOL TARTRATE 5 MG: 5 INJECTION INTRAVENOUS at 13:36

## 2020-06-01 ASSESSMENT — ACTIVITIES OF DAILY LIVING (ADL)
ADLS_ACUITY_SCORE: 19
ADLS_ACUITY_SCORE: 22

## 2020-06-01 ASSESSMENT — MIFFLIN-ST. JEOR: SCORE: 1854.88

## 2020-06-01 ASSESSMENT — PAIN DESCRIPTION - DESCRIPTORS
DESCRIPTORS: BURNING;TINGLING
DESCRIPTORS: BURNING;TINGLING
DESCRIPTORS: BURNING;TINGLING;THROBBING

## 2020-06-01 NOTE — PLAN OF CARE
"/71 (BP Location: Right arm)   Pulse 98   Temp 98.5  F (36.9  C) (Oral)   Resp 17   Ht 1.676 m (5' 6\")   Wt 109.7 kg (241 lb 13.5 oz)   SpO2 98%   BMI 39.03 kg/m      Neuro: Neuros unchanged. A&Ox4. Pt speech is slow. Numbness/tingling to the BLE/BUE. Tremors mainly in the right hand.  Cardiac: ST. VSS.   Respiratory: Sating 98% on RA.  GI/: Adequate urine output. BM X1. Slight red streaks in stool from hemorrhoid  Diet/appetite: Tolerating regular diet. Eating well.  Activity:  Assist of 1 up to chair and commode  Pain: At acceptable level on current regimen.   Skin: No new deficits noted.  LDA's: Left arm    Plan: Continue with POC. Notify primary team with changes.    "

## 2020-06-01 NOTE — PLAN OF CARE
Discharge Planner PT   Patient plan for discharge: would like to return home  Current status: Pt is SBA for supine>sit. Elevated BP initially but improved with recheck, asymptomatic. Pt participates in seated LE exercises and ambulation w/ FWW for 75', CGA w/ gait cues prior to arrival of transport.   Barriers to return to prior living situation: medical, mobility  Recommendations for discharge: ARU  Rationale for recommendations: Pt would be a good candidate for ARU. Is motivated and would be able to tolerate three hours of therapy per day.          Entered by: Gaye Ríos 06/01/2020 1:12 PM

## 2020-06-01 NOTE — PROGRESS NOTES
Social Work Services Progress Note    Hospital Day: 3  Date of Initial Social Work Evaluation:  Not yet completed  Collaborated with:  Patient, Chart Review    Data:  SW following d/t ARU recommendation at discharge.    SW spoke w/Pt by phone @ 1030; she reported she was with other providers, would be transferring rooms, and has ultrasound scheduled for today and that now was not a good time to discuss discharge. She said she would let the nurse know if she has availability later this morning.     Pt did indicate that she is planning to either return to her own home or her mother's home at discharge. SW was unable to discuss ARU recommendation during this phone call. SW to f/u.     Intervention:    -Coordination with Pt    Assessment:  Pt requiring IP hospitalizaton at this time.    Plan:    Anticipated Disposition:  TBD    Barriers to d/c plan:  Medical stability    Follow Up:  SW to continue to follow and assist with discharge plan.    RUIZ Moreno, LGSW  6B Intermediate Care Unit   [facility]  Phone: [phone]  Pager: [phone]

## 2020-06-01 NOTE — PLAN OF CARE
"/85   Pulse 98   Temp 98.3  F (36.8  C) (Oral)   Resp 16   Ht 1.676 m (5' 6\")   Wt 111.3 kg (245 lb 6.4 oz)   SpO2 98%   BMI 39.61 kg/m       Neuro: A/Ox4. Slow, slurred speech. Numbness and tingling in bilateral upper and lower extremities. Tremor present in BUE, worse in RUE.   Cardiac: VSS. Sinus tach with -120s. Denies CP  Respiratory: on RA. No respiratory distress noted.   GI/: voiding without difficulty. Last BM overnight.   Diet/Appetite: NPO for CT angiogram today  Skin: intact. No new skin deficits noted.   LDA: PIV x1 saline locked. Vascular access to insert 18g PIV near AC prior to procedure, order placed.   Activity: Up with assist of 1  Pain: Acceptable with current regimen. PRN tylenol, atarax, and tramadol given. Lyrica dose increased.   Plan: Plan for CT angiogram and [place] puncture today. Awaiting corlanor from pharmacy. Pt transferred to [facility] around 1100. Report given to TEN F[place]. Continue to monitor and follow POC. Notify MD with any change concerns.     "

## 2020-06-01 NOTE — PLAN OF CARE
OT/6B: Cancel. Pt with alternate providers this AM then transferred to 6A. This PM, pt at ECHO then plans for LP. Will cancel OT and reschedule to tomorrow.

## 2020-06-01 NOTE — PLAN OF CARE
Status: /Ox4. Slow, slurred speech. Numbness and tingling in bilateral upper and lower extremities. Tremor present in BUE, worse in RUE.Coronary CT angiogram completed this shift. Pt complaining of chest tightness @1500. Pt now on 2L NC  Provider notified and EKG order in place.    Vitals: VSS on 2L NC.   Neuros: AxOx4. Slow, slurred speech. N/T  bilateral upper and lower extremities. Pain and burning sensations in all extremities.Tremor present in BUE, worse in RUE.  IV: PIV SL   Resp/trach: SL clear.   Diet: NPO for CT angio.   Bowel status: BS+. No BM this shift.   : Voiding spontaneously.   Skin: Intact.   Pain: Pain controlled with PRN tyenol, atarax and traodone.   Activity: Pivot w/ A1 and GB to commode.   Plan: LP for further workup. Continue to monitor and follow POC.

## 2020-06-01 NOTE — PROGRESS NOTES
Pt arrived for Coronary CT angiogram. Test, meds and side effects reviewed with pt. Resting  bpm, given 100 mg PO Metoprolol and 10mg Ivabradine while on inpatient unit per order. Administered additional 50mg PO Metoprolol upon arrival to CT holding room per verbal order. Given 0.8 mg SL nitro and 5 mg IV metoprolol on CTA table per order. CTA completed; tolerated procedure well and denies symptoms of allergic reaction.  Post monitoring completed and VSS.  D/C instructions reviewed with pt whom verbalized understanding of need to increase PO fluids today. Escorted back to 6A via wheelchair and hospital transport.

## 2020-06-01 NOTE — PROGRESS NOTES
"[facility], [place]   Neurology Daily Note  Hilaria Bonner  [id number]  06/01/2020    Subjective:  No acute events overnight. Patient believes trazodone, tramadol, and tizanidine are helping. Does not appreciate any improvement with Lyrica. Feels like her speech is slightly better than admission.     Objective   Physical Examination   Vitals: /71 (BP Location: Right arm)   Pulse 98   Temp 98.5  F (36.9  C) (Oral)   Resp 17   Ht 1.676 m (5' 6\")   Wt 111.3 kg (245 lb 6.4 oz)   SpO2 98%   BMI 39.61 kg/m       General: Adult female patient, lying in bed   HEENT: NC/AT, no icterus, op pink and moist  Cardiac: Tachycardic  Chest: non-labored on RA    Neuro:  Mental status: Awake, alert, attentive, oriented to self, time, place, and circumstance. Language is slow and deliberate, but coherent. Intact comprehension of complex commands, naming and repetition.    Cranial nerves: VFF, conjugate gaze, EOMI, face symmetric, shoulder shrug strong, tongue/uvula midline, no dysarthria.   Motor: Normal bulk and tone. Direction-changing tremor of distal RUE; distractable. 5/5 strength in 4/4 extremities.   Reflexes: 2+ and symmetric biceps, brachioradialis, triceps. 1 in bilateral patellar and achilles. No clonus.   Sensory: Deferred  Gait: Unable to test    Investigations  CT PE study  5/30/2020 8:41 PM  \"IMPRESSION:   1. No pulmonary embolus.  2. Subtle subcentimeter groundglass nodular opacity in the right lung  apex, given patient age, finding is favored to represent resolving or  early infectious or inflammatory process.  3. Healed sternal fracture.  4. Prior gastric surgery.\"    MRI brain w/o and w/ contrast  5/30/2020 8:06 PM  \"Impression: Essentially normal brain MRI with contrast.\"    MRI cervical spine w/o and w/ contrast  5/30/2020 7:49 PM  \"Impression:   1. No abnormal enhancement in the spinal cord, thecal sac or cervical vertebrae.  2. No spinal canal or neural foraminal " "narrowing.\"    RUQ US   5/30/2020 7:15 PM  \"Impression: Possible hepatic steatosis, otherwise unremarkable right upper quadrant ultrasound.\"    Assessment and Plan   Hilaria Bonner is a 29 year old year old female h/o morbid obesity, sleeve gastrectomy (3/2019), PE complicated by cardiac arrest (5/2019) now on Xarelto,  And secondary hypoparathyroidism who presented 5/29/2020 with 1 week of neuropathic pain, new RUE tremor, and two days of slowed speech.    #Bilateral leg pain, paresthesias, and cramping  #RUE tremor  #Slowed speech  Differential includes post-infectious autoimmune neuronopathy in the setting of recent COVID-19 infection. Rheumatologic etiology also possible given family history of SLE and RA. Highest suspicion at this point is for vitamin deficiency given history of bariatric surgery, patient not taking folate as prescribed and recent poor PO intake with acute illness. Thiamine deficiency in particular has been associated with paresthesias, hyperesthesia/severe pain, calf tenderness, and hyporeflexia. MRI brain and c-spine w/o and w/ contrast negative.  - Lumbar puncture today, consider initiating IVIG for possible GBS variant if elevated protein  - Follow up vitamin levels: MMA, folate, vitamin B1, vitamin B6, vitamin E  - Follow up rheumatologic/autoimmune labs: ANCA, ANH, lupus anticoagulant, SSA, SSB, ganglioside antibody, GQ1b  - thiamine 500 mg IV TID  - folic acid 1 mg PO daily  - Citracal 1 tablet PO daily  - Thera-vit 1 tablet PO daily  - consider RD consult for management of suspected vitamin deficiency  - increase pregabalin to 100 mg TID  - start topical gabapentin cream  - tramadol 50 mg TID prn  - tizanidine 4 mg at bedtime prn  - PT/OT as tolerated    #Elevated lactate, resolved  Lactic acid 4.7 on admission, etiology unclear. Normalized to 1.3 with IV fluids.    #Elevated total bilirubin  #Elevated alkaline phosphatase  Afebrile and normal WBC make hepatobiliary pathology less " likely, but recent uptrend in bilirubin is concerning for cholestatic process. RUQ US with possible hepatic steatosis; patient endorses remote history of possible alcohol overuse. Currently has on average 4 drinks per week.   - Repeat CMP tomorrow    #Low UOP, resolved  - discontinue IVF    #Constipation, improved  Two stools charted yesterday and one today.  - Miralax  - senna-docusate BID prn    #H/o COVID-19 infection (tested positive on 4/18 or 4/19)  Retested on admission, COVID-19 negative.    #Macrocytic anemia  No evidence of active bleeding. MCV elevated to 116 consistent with megaloblastic anemia likely 2/2 known h/o vitamin B12 deficiency (currently wnl at 502 after recent injection on 5/20). Iron studies notable for high iron, low iron binding capacity, and elevated ferritin likely reflect iron supplementation.    #Chronic chest pain  #Persistent tachycardia  #H/o cardiac arrest 2/2 PE  Present since cardiac arrest in 5/2019. Takes Tylenol at home which provides inconsistent relief. EKG on admission with sinus tachycardia. Troponin and D-dimer negative. BNP wnl, bedside ultrasound unremarkable. Patient has remained hemodynamically stable. Scheduled for coronary artery CT angiogram as outpatient tomorrow, but patient is currently hospitalized and does endorse ongoing chest pain. Plan for completion while inpatient.  - Cardiac ICU fellow involved, appreciate assistance:    --CT angiogram coronary arteries today, ~12 noon    --premedicate with 1 dose each of ivabradine 10 mg and metoprolol tartrate 100 mg    --NPO prior to procedure  - EKG and troponin if develops new or worsening symptoms    #H/o PE  Takes Xarelto PTA. Endorses compliance. INR goal 2.0-3.0, was 1.57 on admission. CT negative for PE on 5/30.  - Holding PTA Xarelto for lumbar puncture today      FEN: NPO for CT angio coronary arteries, return to Regular diet after procedure  PPx: SCDs, holding PTA Xarelto for lumbar puncture today  Code:  FULL    Dispo: Pending clinical course    Patient was seen and discussed with attending Dr. Upton.    Keysha Steven MD  Neurology PGY-2    Attending physician: I saw and evaluated the patient with the resident team and I agree with the findings and plan of care as per Dr. Steven above, with the following additions.    Active issues include:    1) Painful, predominantly small fiber neuropathy  -Differential diagnosis is nutritional vs. small fiber variant of acute inflammatory neuropathy vs. post-viral neuropathy related to recent COVID-19 diagnosis  -Plan CSF exam, if albuminocytologic dissociation present will treat with IVIG 2 g/kg  -Continue nutritional supplements as above  -Increase pregabalin to 100 mg TID    2) History of cardiac arrest in 2019, likely secondary to pulmonary embolism  -Coronary CT today without evidence of significant coronary calcifications  -No further inpatient work up indicated    Disposition: Likely 1-2 additional nights required to complete above testing and ensure adequate pain control    Channing Upton MD   of Neurology

## 2020-06-02 ENCOUNTER — APPOINTMENT (OUTPATIENT)
Dept: PHYSICAL THERAPY | Facility: CLINIC | Age: 30
End: 2020-06-02
Payer: COMMERCIAL

## 2020-06-02 ENCOUNTER — APPOINTMENT (OUTPATIENT)
Dept: GENERAL RADIOLOGY | Facility: CLINIC | Age: 30
End: 2020-06-02
Payer: COMMERCIAL

## 2020-06-02 ENCOUNTER — APPOINTMENT (OUTPATIENT)
Dept: OCCUPATIONAL THERAPY | Facility: CLINIC | Age: 30
End: 2020-06-02
Payer: COMMERCIAL

## 2020-06-02 LAB
ANCA AB PATTERN SER IF-IMP: NORMAL
APPEARANCE CSF: CLEAR
C-ANCA TITR SER IF: NORMAL {TITER}
COLOR CSF: COLORLESS
GLUCOSE CSF-MCNC: 58 MG/DL (ref 40–70)
GRAM STN SPEC: NORMAL
INR PPP: 0.96 (ref 0.86–1.14)
Lab: NORMAL
PROT CSF-MCNC: 29 MG/DL (ref 15–60)
RBC # CSF MANUAL: 0 /UL (ref 0–2)
SPECIMEN SOURCE: NORMAL
TUBE # CSF: 4 #
VIT B1 BLD-MCNC: 45 NMOL/L (ref 70–180)
WBC # CSF MANUAL: 0 /UL (ref 0–5)

## 2020-06-02 PROCEDURE — 84157 ASSAY OF PROTEIN OTHER: CPT | Performed by: STUDENT IN AN ORGANIZED HEALTH CARE EDUCATION/TRAINING PROGRAM

## 2020-06-02 PROCEDURE — 89050 BODY FLUID CELL COUNT: CPT | Performed by: STUDENT IN AN ORGANIZED HEALTH CARE EDUCATION/TRAINING PROGRAM

## 2020-06-02 PROCEDURE — 25000125 ZZHC RX 250: Performed by: RADIOLOGY

## 2020-06-02 PROCEDURE — 87070 CULTURE OTHR SPECIMN AEROBIC: CPT | Performed by: STUDENT IN AN ORGANIZED HEALTH CARE EDUCATION/TRAINING PROGRAM

## 2020-06-02 PROCEDURE — 94150 VITAL CAPACITY TEST: CPT

## 2020-06-02 PROCEDURE — 25000132 ZZH RX MED GY IP 250 OP 250 PS 637: Performed by: STUDENT IN AN ORGANIZED HEALTH CARE EDUCATION/TRAINING PROGRAM

## 2020-06-02 PROCEDURE — 85610 PROTHROMBIN TIME: CPT | Performed by: STUDENT IN AN ORGANIZED HEALTH CARE EDUCATION/TRAINING PROGRAM

## 2020-06-02 PROCEDURE — 87015 SPECIMEN INFECT AGNT CONCNTJ: CPT | Performed by: STUDENT IN AN ORGANIZED HEALTH CARE EDUCATION/TRAINING PROGRAM

## 2020-06-02 PROCEDURE — 97530 THERAPEUTIC ACTIVITIES: CPT | Mod: GP | Performed by: PHYSICAL THERAPIST

## 2020-06-02 PROCEDURE — 40000275 ZZH STATISTIC RCP TIME EA 10 MIN

## 2020-06-02 PROCEDURE — 62270 DX LMBR SPI PNXR: CPT

## 2020-06-02 PROCEDURE — 36415 COLL VENOUS BLD VENIPUNCTURE: CPT | Performed by: STUDENT IN AN ORGANIZED HEALTH CARE EDUCATION/TRAINING PROGRAM

## 2020-06-02 PROCEDURE — 12000001 ZZH R&B MED SURG/OB UMMC

## 2020-06-02 PROCEDURE — 97110 THERAPEUTIC EXERCISES: CPT | Mod: GP | Performed by: PHYSICAL THERAPIST

## 2020-06-02 PROCEDURE — 82945 GLUCOSE OTHER FLUID: CPT | Performed by: STUDENT IN AN ORGANIZED HEALTH CARE EDUCATION/TRAINING PROGRAM

## 2020-06-02 PROCEDURE — 87205 SMEAR GRAM STAIN: CPT | Performed by: STUDENT IN AN ORGANIZED HEALTH CARE EDUCATION/TRAINING PROGRAM

## 2020-06-02 PROCEDURE — 97116 GAIT TRAINING THERAPY: CPT | Mod: GP | Performed by: PHYSICAL THERAPIST

## 2020-06-02 PROCEDURE — 97535 SELF CARE MNGMENT TRAINING: CPT | Mod: GO | Performed by: OCCUPATIONAL THERAPIST

## 2020-06-02 RX ORDER — PREGABALIN 100 MG/1
100 CAPSULE ORAL 3 TIMES DAILY
Qty: 90 CAPSULE | Refills: 3 | Status: ON HOLD | OUTPATIENT
Start: 2020-06-02 | End: 2020-06-10

## 2020-06-02 RX ORDER — LIDOCAINE HYDROCHLORIDE 10 MG/ML
5 INJECTION, SOLUTION EPIDURAL; INFILTRATION; INTRACAUDAL; PERINEURAL ONCE
Status: COMPLETED | OUTPATIENT
Start: 2020-06-02 | End: 2020-06-02

## 2020-06-02 RX ORDER — LANOLIN ALCOHOL/MO/W.PET/CERES
100 CREAM (GRAM) TOPICAL DAILY
Qty: 90 TABLET | Refills: 3 | Status: SHIPPED | OUTPATIENT
Start: 2020-06-03 | End: 2020-06-05

## 2020-06-02 RX ADMIN — TRAMADOL HYDROCHLORIDE 50 MG: 50 TABLET, FILM COATED ORAL at 07:31

## 2020-06-02 RX ADMIN — PREGABALIN 100 MG: 50 CAPSULE ORAL at 07:31

## 2020-06-02 RX ADMIN — PREGABALIN 100 MG: 50 CAPSULE ORAL at 19:29

## 2020-06-02 RX ADMIN — TRAMADOL HYDROCHLORIDE 50 MG: 50 TABLET, FILM COATED ORAL at 15:44

## 2020-06-02 RX ADMIN — TRAMADOL HYDROCHLORIDE 50 MG: 50 TABLET, FILM COATED ORAL at 21:35

## 2020-06-02 RX ADMIN — TIZANIDINE 4 MG: 4 TABLET ORAL at 22:45

## 2020-06-02 RX ADMIN — POLYETHYLENE GLYCOL 3350 17 G: 17 POWDER, FOR SOLUTION ORAL at 07:33

## 2020-06-02 RX ADMIN — LIDOCAINE HYDROCHLORIDE 5 ML: 10 INJECTION, SOLUTION EPIDURAL; INFILTRATION; INTRACAUDAL; PERINEURAL at 11:13

## 2020-06-02 RX ADMIN — Medication 1 TABLET: at 19:30

## 2020-06-02 RX ADMIN — TRAZODONE HYDROCHLORIDE 50 MG: 50 TABLET ORAL at 22:45

## 2020-06-02 RX ADMIN — FOLIC ACID 1 MG: 1 TABLET ORAL at 07:32

## 2020-06-02 RX ADMIN — POTASSIUM CHLORIDE 10 MEQ: 750 TABLET, EXTENDED RELEASE ORAL at 19:30

## 2020-06-02 RX ADMIN — OXYCODONE HYDROCHLORIDE AND ACETAMINOPHEN 1000 MG: 500 TABLET ORAL at 07:33

## 2020-06-02 RX ADMIN — PREGABALIN 100 MG: 50 CAPSULE ORAL at 13:25

## 2020-06-02 RX ADMIN — ACETAMINOPHEN 650 MG: 325 TABLET, FILM COATED ORAL at 21:35

## 2020-06-02 RX ADMIN — ACETAMINOPHEN 650 MG: 325 TABLET, FILM COATED ORAL at 17:27

## 2020-06-02 RX ADMIN — Medication 1 TABLET: at 07:32

## 2020-06-02 RX ADMIN — THIAMINE HCL TAB 100 MG 100 MG: 100 TAB at 07:32

## 2020-06-02 RX ADMIN — DICLOFENAC 2 G: 10 GEL TOPICAL at 22:46

## 2020-06-02 RX ADMIN — POTASSIUM CHLORIDE 10 MEQ: 750 TABLET, EXTENDED RELEASE ORAL at 07:32

## 2020-06-02 RX ADMIN — DICLOFENAC 2 G: 10 GEL TOPICAL at 17:27

## 2020-06-02 RX ADMIN — TRAZODONE HYDROCHLORIDE 50 MG: 50 TABLET ORAL at 00:14

## 2020-06-02 RX ADMIN — Medication 1 G: at 17:27

## 2020-06-02 RX ADMIN — Medication 1 G: at 02:00

## 2020-06-02 RX ADMIN — MULTIPLE VITAMINS W/ MINERALS TAB 1 TABLET: TAB at 07:32

## 2020-06-02 RX ADMIN — Medication 1 G: at 12:48

## 2020-06-02 RX ADMIN — MELATONIN 1000 UNITS: at 07:32

## 2020-06-02 RX ADMIN — TRAMADOL HYDROCHLORIDE 50 MG: 50 TABLET, FILM COATED ORAL at 00:14

## 2020-06-02 RX ADMIN — HYDROXYZINE HYDROCHLORIDE 25 MG: 25 TABLET ORAL at 23:43

## 2020-06-02 RX ADMIN — HYDROXYZINE HYDROCHLORIDE 25 MG: 25 TABLET ORAL at 17:27

## 2020-06-02 RX ADMIN — TIZANIDINE 4 MG: 4 TABLET ORAL at 00:45

## 2020-06-02 ASSESSMENT — ACTIVITIES OF DAILY LIVING (ADL)
ADLS_ACUITY_SCORE: 21
ADLS_ACUITY_SCORE: 19
ADLS_ACUITY_SCORE: 21

## 2020-06-02 ASSESSMENT — PAIN DESCRIPTION - DESCRIPTORS: DESCRIPTORS: BURNING;TINGLING

## 2020-06-02 NOTE — PLAN OF CARE
Status: Admitted with neuropathic pain, new RUE tremor, and two days of slowed speech.  Vitals: VSS. CCM   Neuros: O/x4. N/T to all extremities along with burning sensation. Tremors to BUE.   IV: PIV SL   Diet: Regular  Bowel status: no BM   : Voiding spontaneously  Skin: Intact  Pain: PRN tylenol, atarax, and tramadol given. Voltaren gel to hands and feet.  Activity: Up with 1, GB.   Plan: LP tomorrow. Continue to follow POC

## 2020-06-02 NOTE — PLAN OF CARE
Status: Admitted with neuropathic pain, new RUE tremor, and two days of slowed speech.  Vitals: VSS.   Neuros:  Speech is clear, not slow this shift.N/T/burning to hands and feet up past knees bilaterally. States unchanged from yesterday.R UE tremors with drift check. NICHOLS = strong.  IV: PIV SL   Diet: Regular  Bowel status: 2 BM's today.   : Voiding spontaneously  Skin: Intact  Pain: tramadol x 1 and gabepentin ointment -stated that that really helped her feet, but not her hands.  Activity: Up with 1, GB.   Plan: OT recommending rehab. She would like to go home . PT is seeing now. Will update MD once PT is done seeing her. Poss discharge tonight or rehab tomorrow?

## 2020-06-02 NOTE — PLAN OF CARE
"BP (!) 132/99 (BP Location: Right arm)   Pulse 98   Temp 98.4  F (36.9  C) (Oral)   Resp 16   Ht 1.676 m (5' 6\")   Wt 111.3 kg (245 lb 6.4 oz)   SpO2 100%   BMI 39.61 kg/m    Patient was up til about 2am, has slept well since. Gabapentin ointment and pain meds given for her pain.   Up with SBA to the commode X2  Neuro's intact.   Continue with current POC  Problem: Adult Inpatient Plan of Care  Goal: Plan of Care Review  6/1/2020 2225 by Sara Conklin RN  Outcome: No Change     Problem: Adult Inpatient Plan of Care  Goal: Patient-Specific Goal (Individualization)  6/2/2020 0509 by Christa Parker RN  Outcome: No Change     Problem: Adult Inpatient Plan of Care  Goal: Absence of Hospital-Acquired Illness or Injury  6/2/2020 0509 by Christa Parker RN  Outcome: No Change     Problem: Adult Inpatient Plan of Care  Goal: Optimal Comfort and Wellbeing  6/2/2020 0509 by Christa Parker RN  Outcome: No Change     Problem: Adult Inpatient Plan of Care  Goal: Readiness for Transition of Care  6/2/2020 0509 by Christa Parker RN  Outcome: No Change     Problem: Pain Acute  Goal: Optimal Pain Control  6/2/2020 0509 by Christa Parker, RN  Outcome: No Change     "

## 2020-06-02 NOTE — PLAN OF CARE
Discharge Planner PT   Patient plan for discharge: would like to return home but also processing today's PT recommendation, would like to talk with her mom and further think it over.   Current status: PT 6A: Pt participated well with good motivation. Pt very much wants to regain her strength to return home. Pt required SBA supine<>sit, grab bar support or walker to stand from lower surfaces with CGA. Also CGA to stand and pull up her pants, some set-up assist to safely perform her own toileting/pericares. Pt ambulated with CGA and FWW with foot drag R > L that improved with heel>toe gait cues. Pt shaky in LEs with gait, WC follow provided x 38 ft, pt also ambulated 15 ft, 10 ft. Pt wheeled to rehab gym, was able to tap step with L LE but unable to navigate up a single step using L LE with B UE grasp on railing due to sensation her R LE was going to buckle, fear for her safety. UE pain and reduced grasp noted reported when holding onto stair railings or walker limiting grasp duration. Provided pt with seated ex program to further address her strength concerns. Further time spent educating pt regarding PT discharge recommendation and rationale. Pt appeared receptive and to be processing the information, noted she would talk with her mom on the phone further regarding her plan.     Barriers to return to prior living situation: as pt not yet able to ascend or descend stairs in PT stairs are a large barrier and concern for safe discharge along with pt walking < safe household distances and benefited today from WC follow due to reduced walking tolerance. Pt at high risk for falls, it appears caregiver support would not be consistently available at home as pt's mom would be caring for 3 and 10 yr old children in addition to pt, and pt requires close assist for gait.   Recommendations for discharge: strongly recommend ARU  Rationale for recommendations: Anticipate pt would need several days of intensive rehab to ensure safe  discharge to home, from PT standpoint she is not currently at that level. Due to concern for discharge home and pt has current ARU needs. Pt would be a good candidate for ARU. 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          Entered by: Piper Curran 06/02/2020 3:16 PM

## 2020-06-02 NOTE — PLAN OF CARE
Discharge Planner OT   Patient plan for discharge: home  Current status: Pt required Min A for walking in room, did not appear safe to walk by herself. Min A for LE dressing and SBA for g/h tasks while standing at the sink. VSS while on RA.   Barriers to return to prior living situation: mobility/safety  Recommendations for discharge: ARU  Rationale for recommendations: to maximize ADL I and safety prior to returning home.        Entered by: Momo Black 06/02/2020 10:22 AM

## 2020-06-02 NOTE — PROGRESS NOTES
"[facility], [place]   Neurology Daily Note  Hilaria Bonner  [id number]  06/02/2020    Subjective:  No acute events overnight. Feels improvement in pain, notes effectiveness of diclofenac gel over lidocaine cream. Is uncertain about gabapentin cream. XR lumbar puncture today. Agrees with plan to discharge home today if CSF studies are normal.     Objective   Physical Examination   Vitals: /74   Pulse 82   Temp 98.8  F (37.1  C) (Oral)   Resp 16   Ht 1.676 m (5' 6\")   Wt 111.3 kg (245 lb 6.4 oz)   SpO2 100%   BMI 39.61 kg/m       General: Adult female patient, lying in bed   HEENT: NC/AT, no icterus, op pink and moist  Cardiac: Tachycardic  Chest: non-labored on RA    Neuro:  Mental status: Awake, alert, attentive, oriented to self, time, place, and circumstance. Language is improved in speed/fluency. Intact comprehension of complex commands, naming and repetition.    Cranial nerves: VFF, conjugate gaze, EOMI, face symmetric, shoulder shrug strong, tongue/uvula midline, no dysarthria.   Motor: Normal bulk and tone. Direction-changing tremor of distal RUE; distractable. 5/5 strength in 4/4 extremities.   Reflexes: 2+ and symmetric biceps, brachioradialis, triceps. 1 in bilateral patellar and achilles. No clonus.   Sensory: Diminished to light touch over RUE and RLE, vibratory sense extinguishes in 5-7 sec in all four extremities.  Gait: Deferred    Investigations    INR   Date Value Ref Range Status   06/02/2020 0.96 0.86 - 1.14 Final     Folate low at 1.4  ESR and CRP elevated  D-dimer wnl  TSH wnl  Vitamins B6, B12, and E wnl  SSA, SSB, dsDNA negative  ANH, ANCA, MMA, lupus anticoagulant, and ganglioside antibodies pending    CT PE study  5/30/2020 8:41 PM  \"IMPRESSION:   1. No pulmonary embolus.  2. Subtle subcentimeter groundglass nodular opacity in the right lung  apex, given patient age, finding is favored to represent resolving or  early infectious or inflammatory " "process.  3. Healed sternal fracture.  4. Prior gastric surgery.\"    MRI brain w/o and w/ contrast  5/30/2020 8:06 PM  \"Impression: Essentially normal brain MRI with contrast.\"    MRI cervical spine w/o and w/ contrast  5/30/2020 7:49 PM  \"Impression:   1. No abnormal enhancement in the spinal cord, thecal sac or cervical vertebrae.  2. No spinal canal or neural foraminal narrowing.\"    RUQ US   5/30/2020 7:15 PM  \"Impression: Possible hepatic steatosis, otherwise unremarkable right upper quadrant ultrasound.\"    Assessment and Plan   Hilaria Bonner is a 29 year old year old female h/o morbid obesity, sleeve gastrectomy (3/2019), PE complicated by cardiac arrest (5/2019) now on Xarelto,  And secondary hypoparathyroidism who presented 5/29/2020 with 1 week of neuropathic pain, new RUE tremor, and two days of slowed speech.    #Bilateral leg pain, paresthesias, and cramping, improving  #RUE tremor, improved  #Slowed speech, improved  Differential includes post-infectious autoimmune neuronopathy in the setting of recent COVID-19 infection. Rheumatologic etiology also possible given family history of SLE and RA. Highest suspicion at this point is for vitamin deficiency given history of bariatric surgery, patient not taking folate as prescribed and recent poor PO intake with acute illness. Thiamine deficiency in particular has been associated with paresthesias, hyperesthesia/severe pain, calf tenderness, and hyporeflexia. MRI brain and c-spine w/o and w/ contrast negative.  - Lumbar puncture today, if elevated protein will treat with IVIG for 3-5 day for possible GBS variant  - thiamine 500 mg IV TID  - folic acid 1 mg PO daily  - Citracal 1 tablet PO daily  - Thera-vit 1 tablet PO daily  - consider RD consult for management of suspected vitamin deficiency  - increase pregabalin to 100 mg TID  - start topical gabapentin cream  - tramadol 50 mg TID prn  - tizanidine 4 mg at bedtime prn  - PT/OT as tolerated    #Elevated " lactate, resolved  Lactic acid 4.7 on admission, etiology unclear. Normalized to 1.3 with IV fluids.    #Elevated total bilirubin  #Elevated alkaline phosphatase  Afebrile and normal WBC make hepatobiliary pathology less likely, but recent uptrend in bilirubin is concerning for cholestatic process. RUQ US with possible hepatic steatosis; patient endorses remote history of possible alcohol overuse. Currently has on average 4 drinks per week, feels this is no longer a problem.     #Low UOP, resolved  - discontinue IVF    #Constipation, improved  Two stools charted yesterday and one today.  - Miralax  - senna-docusate BID prn    #H/o COVID-19 infection (tested positive on 4/18 or 4/19)  Retested on admission, COVID-19 negative.    #Macrocytic anemia  No evidence of active bleeding. MCV elevated to 116 consistent with megaloblastic anemia likely 2/2 known h/o vitamin B12 deficiency (currently wnl at 502 after recent injection on 5/20). Iron studies notable for high iron, low iron binding capacity, and elevated ferritin likely reflect iron supplementation.    #Chronic chest pain  #Persistent tachycardia  #H/o cardiac arrest 2/2 PE  Present since cardiac arrest in 5/2019. Takes Tylenol at home which provides inconsistent relief. EKG on admission with sinus tachycardia. Troponin and D-dimer negative. BNP wnl, bedside ultrasound unremarkable. Patient has remained hemodynamically stable. Scheduled for coronary artery CT angiogram as outpatient tomorrow, but patient is currently hospitalized and does endorse ongoing chest pain.  - CT coronary angiogram completed 6/1, unremarkable  - EKG and troponin if develops new or worsening symptoms    #H/o PE  Takes Xarelto PTA. Endorses compliance. INR goal 2.0-3.0, was 1.57 on admission. CT negative for PE on 5/30.  - Holding PTA Xarelto since admission for lumbar puncture      FEN: Regular diet  PPx: SCDs, resume PTA Xarelto after lumbar puncture  Code: FULL    Dispo: Pending clinical  course    Patient was seen and discussed with attending Dr. Upton.    Keysha Steven MD  Neurology PGY-2    Attending physician: I saw and evaluated the patient with the resident team and I agree with the findings and plan of care as per Dr. Steven above, with the following additions.    The patient is reporting improved pain control today; cell count, glucose and protein on CSF examination were normal.    Leading suspicion continues to be a predominantly sensory neuropathy related to nutritional deficiency.    Disposition: ARU recommended by PT/OT today; after discussion, the patient is interested in pursuing ARU admission and will request assessment for admission.    Channing Upton MD   of Neurology
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
## 2020-06-03 ENCOUNTER — APPOINTMENT (OUTPATIENT)
Dept: OCCUPATIONAL THERAPY | Facility: CLINIC | Age: 30
End: 2020-06-03
Payer: COMMERCIAL

## 2020-06-03 ENCOUNTER — APPOINTMENT (OUTPATIENT)
Dept: PHYSICAL THERAPY | Facility: CLINIC | Age: 30
End: 2020-06-03
Payer: COMMERCIAL

## 2020-06-03 LAB
ANA PAT SER IF-IMP: ABNORMAL
ANA SER QL IF: POSITIVE
ANA TITR SER IF: ABNORMAL {TITER}
GLUCOSE BLDC GLUCOMTR-MCNC: 110 MG/DL (ref 70–99)
INR PPP: 0.95 (ref 0.86–1.14)
MISCELLANEOUS TEST: NORMAL

## 2020-06-03 PROCEDURE — 25000132 ZZH RX MED GY IP 250 OP 250 PS 637: Performed by: STUDENT IN AN ORGANIZED HEALTH CARE EDUCATION/TRAINING PROGRAM

## 2020-06-03 PROCEDURE — 00000146 ZZHCL STATISTIC GLUCOSE BY METER IP

## 2020-06-03 PROCEDURE — 94150 VITAL CAPACITY TEST: CPT

## 2020-06-03 PROCEDURE — 85610 PROTHROMBIN TIME: CPT | Performed by: PSYCHIATRY & NEUROLOGY

## 2020-06-03 PROCEDURE — 85730 THROMBOPLASTIN TIME PARTIAL: CPT | Performed by: STUDENT IN AN ORGANIZED HEALTH CARE EDUCATION/TRAINING PROGRAM

## 2020-06-03 PROCEDURE — 97535 SELF CARE MNGMENT TRAINING: CPT | Mod: GO

## 2020-06-03 PROCEDURE — 97530 THERAPEUTIC ACTIVITIES: CPT | Mod: GP | Performed by: PHYSICAL THERAPIST

## 2020-06-03 PROCEDURE — 36415 COLL VENOUS BLD VENIPUNCTURE: CPT | Performed by: PSYCHIATRY & NEUROLOGY

## 2020-06-03 PROCEDURE — 97116 GAIT TRAINING THERAPY: CPT | Mod: GP | Performed by: PHYSICAL THERAPIST

## 2020-06-03 PROCEDURE — 40000275 ZZH STATISTIC RCP TIME EA 10 MIN

## 2020-06-03 PROCEDURE — 00000401 ZZHCL STATISTIC THROMBIN TIME NC: Performed by: STUDENT IN AN ORGANIZED HEALTH CARE EDUCATION/TRAINING PROGRAM

## 2020-06-03 PROCEDURE — 36415 COLL VENOUS BLD VENIPUNCTURE: CPT | Performed by: STUDENT IN AN ORGANIZED HEALTH CARE EDUCATION/TRAINING PROGRAM

## 2020-06-03 PROCEDURE — 85613 RUSSELL VIPER VENOM DILUTED: CPT | Performed by: STUDENT IN AN ORGANIZED HEALTH CARE EDUCATION/TRAINING PROGRAM

## 2020-06-03 PROCEDURE — 12000001 ZZH R&B MED SURG/OB UMMC

## 2020-06-03 PROCEDURE — 00000167 ZZHCL STATISTIC INR NC: Performed by: STUDENT IN AN ORGANIZED HEALTH CARE EDUCATION/TRAINING PROGRAM

## 2020-06-03 PROCEDURE — 97110 THERAPEUTIC EXERCISES: CPT | Mod: GP | Performed by: PHYSICAL THERAPIST

## 2020-06-03 RX ORDER — CAPSAICIN 0.75 MG/G
CREAM TOPICAL 3 TIMES DAILY PRN
Status: DISCONTINUED | OUTPATIENT
Start: 2020-06-03 | End: 2020-06-04

## 2020-06-03 RX ORDER — LIDOCAINE 50 MG/G
OINTMENT TOPICAL EVERY 4 HOURS PRN
Status: DISCONTINUED | OUTPATIENT
Start: 2020-06-03 | End: 2020-06-03

## 2020-06-03 RX ADMIN — RIVAROXABAN 20 MG: 20 TABLET, FILM COATED ORAL at 11:33

## 2020-06-03 RX ADMIN — DICLOFENAC 2 G: 10 GEL TOPICAL at 16:28

## 2020-06-03 RX ADMIN — POTASSIUM CHLORIDE 10 MEQ: 750 TABLET, EXTENDED RELEASE ORAL at 19:00

## 2020-06-03 RX ADMIN — CAPSAICIN: 0.75 CREAM TOPICAL at 22:29

## 2020-06-03 RX ADMIN — Medication 1 TABLET: at 19:00

## 2020-06-03 RX ADMIN — MELATONIN 1000 UNITS: at 09:00

## 2020-06-03 RX ADMIN — TRAMADOL HYDROCHLORIDE 50 MG: 50 TABLET, FILM COATED ORAL at 03:52

## 2020-06-03 RX ADMIN — MULTIPLE VITAMINS W/ MINERALS TAB 1 TABLET: TAB at 09:00

## 2020-06-03 RX ADMIN — ACETAMINOPHEN 650 MG: 325 TABLET, FILM COATED ORAL at 16:29

## 2020-06-03 RX ADMIN — Medication 1 G: at 09:06

## 2020-06-03 RX ADMIN — Medication 1 G: at 17:30

## 2020-06-03 RX ADMIN — POTASSIUM CHLORIDE 10 MEQ: 750 TABLET, EXTENDED RELEASE ORAL at 09:00

## 2020-06-03 RX ADMIN — PREGABALIN 100 MG: 50 CAPSULE ORAL at 13:22

## 2020-06-03 RX ADMIN — PREGABALIN 100 MG: 50 CAPSULE ORAL at 08:56

## 2020-06-03 RX ADMIN — OXYCODONE HYDROCHLORIDE AND ACETAMINOPHEN 1000 MG: 500 TABLET ORAL at 09:00

## 2020-06-03 RX ADMIN — FOLIC ACID 1 MG: 1 TABLET ORAL at 09:00

## 2020-06-03 RX ADMIN — TRAMADOL HYDROCHLORIDE 50 MG: 50 TABLET, FILM COATED ORAL at 11:40

## 2020-06-03 RX ADMIN — HYDROXYZINE HYDROCHLORIDE 50 MG: 25 TABLET ORAL at 16:29

## 2020-06-03 RX ADMIN — HYDROXYZINE HYDROCHLORIDE 50 MG: 25 TABLET ORAL at 22:37

## 2020-06-03 RX ADMIN — TRAMADOL HYDROCHLORIDE 50 MG: 50 TABLET, FILM COATED ORAL at 18:54

## 2020-06-03 RX ADMIN — PREGABALIN 100 MG: 50 CAPSULE ORAL at 19:00

## 2020-06-03 RX ADMIN — Medication 1 TABLET: at 09:00

## 2020-06-03 RX ADMIN — ACETAMINOPHEN 650 MG: 325 TABLET, FILM COATED ORAL at 20:42

## 2020-06-03 RX ADMIN — POLYETHYLENE GLYCOL 3350 17 G: 17 POWDER, FOR SOLUTION ORAL at 08:58

## 2020-06-03 RX ADMIN — THIAMINE HCL TAB 100 MG 100 MG: 100 TAB at 09:00

## 2020-06-03 RX ADMIN — Medication 1 G: at 02:02

## 2020-06-03 RX ADMIN — ACETAMINOPHEN 650 MG: 325 TABLET, FILM COATED ORAL at 02:02

## 2020-06-03 ASSESSMENT — ACTIVITIES OF DAILY LIVING (ADL)
ADLS_ACUITY_SCORE: 21
ADLS_ACUITY_SCORE: 19
ADLS_ACUITY_SCORE: 21

## 2020-06-03 NOTE — PLAN OF CARE
Status: Admitted with neuropathic pain, new RUE tremor, and two days of slowed speech.  Vitals: VSS on RA, HTN within parameters and intermittent tachycardia (100-110bpm)  Neuros: A&xO4. Slow speech, N/T to BUE hands and BLE from knees down, RUE tremor, 4/5 t/o, generalized weakness. Did c/o lightheadedness at beginning of shift resolved with eating courtesy meal  IV: L. PIV SL   Diet: Regular  Bowel status: Had a large BM this shift. Pt. with hemorrhoid.    : Voiding spontaneously. Bladder scan WNL.   Skin: LP site with bandaid  Pain: PRN tylenol, atarax, tramadol, and scheduled gabapentin cream for pain this shift  Activity: A1/GB/Walker, R. foot drags when ambulating    Plan: ARU recommended by PT/OT, SW following. Continue to follow POC.

## 2020-06-03 NOTE — PROGRESS NOTES
"[facility], [place]   Neurology Daily Note  Hilaria Bonner  [id number]  06/03/2020    Subjective:  No acute events overnight, though did not sleep well overnight due to presence of bilateral lower extremity leg cramping R>L which patient worried represented DVT. Ongoing pain in bilateral hands, feels like rubbing sandpaper between her hands. Recalls that she was unsteady yesterday with PT and was unable to do stairs. Walking also seems to provoke burning pain in feet. Agrees with plan to discharge to ARU.     Objective   Physical Examination   Vitals: /68 (BP Location: Right arm)   Pulse 97   Temp 97.7  F (36.5  C) (Oral)   Resp 16   Ht 1.676 m (5' 6\")   Wt 111.3 kg (245 lb 6.4 oz)   SpO2 100%   BMI 39.61 kg/m       General: Adult female patient, sitting up in bed in no acute distress  HEENT: NC/AT, no icterus, op pink and moist  Chest: non-labored on RA  Extremities: warm, no edema  Skin: No rashes or lesions    Neuro:  Mental status: Awake, alert, attentive, oriented to self, time, place, and circumstance. Language improved in speed/fluency. Intact comprehension of complex commands, naming and repetition. Asks many relevant questions.    Cranial nerves: VFF, conjugate gaze, EOMI, face symmetric, shoulder shrug strong, tongue/uvula midline, no dysarthria.   Motor: Normal bulk and tone. Occasional tremor of distal RUE; distractable. 5/5 strength in 4/4 extremities.   Reflexes: 2+ and symmetric biceps, brachioradialis, triceps. 1 in bilateral patellar, absent in achilles. No clonus.   Sensory: Diminished to light touch over RUE and RLE, vibratory sense extinguishes in 6-7 sec in all four extremities.  Gait: Deferred    Investigations    LABS   Ref. Range 6/2/2020 11:15   RBC CSF 0 - 2 /uL 0   WBC CSF 0 - 5 /uL 0   Glucose CSF 40 - 70 mg/dL 58   Protein Total CSF 15 - 60 mg/dL 29     ANH - 1:160  ANCA - negative     Assessment and Plan   Hilaria Bonner is a 29 year old year " old female h/o morbid obesity, sleeve gastrectomy (3/2019), PE complicated by cardiac arrest (5/2019) now on Xarelto, and secondary hypoparathyroidism who presented 5/29/2020 with 1 week of neuropathic pain, new RUE tremor, and two days of slowed speech.    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 MRI brain and cervical spine normal. CSF protein, glucose, and cell counts normal. ESR and CRP elevated, ANH weakly positive. Differential includes post-infectious autoimmune neuropathy in the setting of recent COVID-19 infection. Rheumatologic etiology also possible given family history of SLE and RA, though RF, dsDNA, SSA, and SSB are negative this admission. Highest suspicion at this point is for vitamin deficiency given h/o bariatric surgery, noncompliance with folate supplementation, and recent poor PO intake related to acute illness. Folate remarkably low at 1.4. Vitamins E, B6, and 12 wnl (of note, receives monthly B12 injections with last one on 5/20). Supplemented throughout this admission with high-dose thiamine and folate. Symptoms have improved since admission.  - thiamine 100 mg daily  - folic acid 1 mg PO daily  - Citracal 1 tablet PO daily  - Thera-vit 1 tablet PO daily  - vitamin C 1,000 mg daily  - vitamin D3 1,000 units daily  - pregabalin 100 mg TID  - gabapentin topical cream  - tramadol 50 mg TID prn  - tizanidine 4 mg at bedtime prn  - PT/OT as tolerated  - Follow up labs: MMA, lupus anticoagulant, and ganglioside antibody  - Follow up with neurology in 1-2 months as outpatient  - Consider skin biopsy and/or EMG if not improved by that time    #Elevated lactate, resolved  Lactic acid 4.7 on admission, etiology unclear. Normalized to 1.3 with IV fluids.    #Elevated total bilirubin  #Elevated alkaline  phosphatase  Afebrile and normal WBC make hepatobiliary pathology less likely, but recent uptrend in bilirubin is concerning for cholestatic process. RUQ US with possible hepatic steatosis; patient endorses remote history of possible alcohol overuse. Currently has on average 4 drinks per week, feels this is no longer a problem.     #Low UOP, resolved  - discontinue IVF    #Constipation, improved  #Hemorrhoids  Multiple stools since admission, varying hardness and streaked with bright red blood. Hemorrhoids noted per nursing exam.   - Miralax daily  - senna-docusate BID prn    #H/o COVID-19 infection (tested positive on 4/18 or 4/19)  Retested on admission, COVID-19 negative.    #Macrocytic anemia  No evidence of active bleeding. MCV elevated to 116 consistent with megaloblastic anemia likely 2/2 known h/o vitamin B12 deficiency (currently wnl at 502 after recent injection on 5/20). Iron studies notable for high iron, low iron binding capacity, and elevated ferritin likely reflect iron supplementation.    #Chronic chest pain  #Persistent tachycardia  #H/o cardiac arrest 2/2 PE  Present since cardiac arrest in 5/2019. Takes Tylenol at home which provides inconsistent relief. EKG on admission with sinus tachycardia. Troponin and D-dimer negative. BNP wnl, bedside ultrasound unremarkable. Patient has remained hemodynamically stable. Scheduled for coronary artery CT angiogram as outpatient tomorrow, but patient is currently hospitalized and does endorse ongoing chest pain.  - CT coronary angiogram completed 6/1, unremarkable  - EKG and troponin if develops new or worsening symptoms    #H/o PE  Takes Xarelto PTA. Endorses compliance. INR goal 2.0-3.0, was 1.57 on admission. CT negative for PE on 5/30. PTA Xarelto held from admission until lumbar puncture on 6/2.   - Resume PTA Xarelto today      FEN: Regular diet  PPx: SCDs, PTA Xarelto  Code: FULL    Dispo: Medically ready for discharge to ARU    Patient was seen and  discussed with attending Dr. Upton.    Keysha Steven MD  Neurology PGY-2    Attending physician: I saw and evaluated the patient with the resident team and I agree with the findings and plan of care as documented above. Continuing to work on pain control, but overall improved since admission. Presumptive diagnosis continues to be predominantly sensory neuropathy likely related to multiple nutritional deficiencies in the setting of recent bariatric surgery and non-compliance with vitamin supplements.    Disposition: Patient now agreeable to ARU placement, which we are pursuing actively.    Channing Upton MD   of Neurology

## 2020-06-03 NOTE — PROGRESS NOTES
Social Work Services Progress Note    Hospital Day: 6  Date of Initial Social Work Evaluation:  6/1/20  Collaborated with:  RAJINDER Manrique ARU Liason Magdalena ([phone])    Data:  SW following to support and coordinate discharge.  Current recommendation is stepdown to ARU.     Intervention:  Social Work Assessment completed with Hilaria, see additional note.  Hilaria is onboard with PT recommendation for ARU.  ARU referral placed and reviewed by RN Liaison Magdalena ([phone]) who states that earliest Hilaria could transition into their care would be Friday 6/4.  This is due to her positive history with COVID-19.  RAJINDER ARU Liasons will continue to follow pt progress.      Hilaria informed of ARU status and SW will continue to follow.     Hilaria states that she is confident that she would receive additional care at home from Mother and family members when ARU support is no longer needed.  She has a 4WW at home.     Plan:    Anticipated Disposition:  [facility] [facility], per current medical and PT recommendation    Barriers to d/c plan:  None identified.    Follow Up:  SW available to support, as needed.     JESSICA Loco, MSW  Social Work Services, Emergency Dept [facility]  Pager: [phone] Mon-Sat 9 am - 9 pm, on-call/after hours pager [phone]

## 2020-06-03 NOTE — PLAN OF CARE
Status: Admitted with neuropathic pain, new RUE tremor, and two days of slowed speech.After test results have returned, pt reports that MD's feel that this was caused by nutritional deficiencies.  Vitals: VSS.   Neuros:  Speech is clear, not slow this shift.N/T/burning to hands and feet up past knees bilaterally. States unchanged from yesterday.R UE tremors with drift check.   IV: PIV SL   Diet: Regular  Bowel status: small BM today.   : Voiding spontaneously  Skin: Intact  Pain: tramadol x 1 and gabepentin ointment .  Activity: Up with 1,walker and GB.   Plan: ARU placement.

## 2020-06-03 NOTE — PROGRESS NOTES
"Social Work: Assessment with Discharge Plan    Patient Name:  Hilaria Bonner  :  1990  Age:  29 year old  MRN:  [id number]  Risk/Complexity Score:  Filed Complexity Screen Score: 6  Completed assessment with:  Hilaria, chart review    Presenting Information   Reason for Referral:  Discharge plan  Date of Intake:  Melani 3, 2020  Referral Source:  Chart Review  Decision Maker:  Patient  Alternate Decision Maker:  Mother, Jazmin Bonner  Health Care Directive:  Provided education  Living Situation: Apartment; lives alone with two children ages 10 and 3  Previous Functional Status:  Independent; reports full cares for self and children; had a walker at home already from previous hospitalization and used for two days prior to this admission.   Patient and family understanding of hospitalization:  \"I'm here because I have a vitamin deficiency\".   Cultural/Language/Spiritual Considerations:  Hindu; prayer is a big part of her estephania.  Adjustment to Illness:  Ongoing.     Physical Health  Reason for Admission:    1. Paresthesias    2. Paresthesia    3. Unsteady gait    4. COVID-19 recent    5. History of viral illness      Services Needed/Recommended:  ARU    Mental Health/Chemical Dependency  Diagnosis:  note from Neurology Resident notes anxiety.  Limited formal MH dx in chart.  Former smoker.   Support/Services in Place:  Hilaria states this is not an interest for her at this time.  Services Needed/Recommended:  Continue to check in with her regarding future mental health support.     Support System  Significant relationship at present time:  Mother  Family of origin is available for support:  Yes, Dad, Aunt, Kid's father; all are onboard to support her children while she recovers.  Other support available:  See above.  Gaps in support system:  None identified.  Patient is caregiver to:  Child: Two children 10 y/o and 3 y/o.     Provider Information   Primary Care Physician:  Crissy Madrigal" "[phone]   Clinic:  [address] [facility] [address]      :  None.    Financial   Income Source:  Workers Comp $474/wk ending soon.  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  She has been in regular communication with her HR department.  HR sent her MARIA paperwork but she has had no way to print it to fill out and send back.  No WIC.  No food stamps.  Previously ineligible but if she continues not working this will likely change.    Financial Concerns:  Worried about affording rent next month. 7/1.  Insurance:    Payor/Plan Subscriber Name Rel Member # Group #   [employer] - [employer] [place] HILARIA JOHANSEN  [id number]       [id number]       Discharge Plan   Patient and family discharge goal:  ARU for short rehab then return home.  Provided education on discharge plan:  YES  Patient agreeable to discharge plan:  YES  A list of Medicare Certified Facilities was provided to the patient and/or family to encourage patient choice. Patient's choices for facility are:  ARU options discussed and Hilaria prefers [facility].   Will NH provide Skilled rehabilitation or complex medical:  YES  General information regarding anticipated insurance coverage and possible out of pocket cost was discussed. Patient and patient's family are aware patient may incur the cost of transportation to the facility, pending insurance payment: YES  Barriers to discharge:  None identified.    Discharge Recommendations   Anticipated Disposition:  Facility:  [facility], pending review  Transportation Needs:  Medical:  Wheelchair  Name of Transportation Company and Phone:  [employer] ([phone])    Additional comments   Hilaria notes that she is in need of additional clothing.  Per ARU, preference is for clothing to be dropped by family members to hospital prior to transition to ARU.  Hilaria feels her mother could bring her clothing on 6/4 and " will speak with her to confirm.      RUBÉN will continue to follow to support coordination of discharge.     JESSICA Loco, MSW  Social Work Services, Emergency Dept [facility]  Pager: [phone] Mon-Sat 9 am - 9 pm, on-call/after hours pager [phone]

## 2020-06-03 NOTE — PLAN OF CARE
Status: Admitted with neuropathic pain, new RUE tremor, and two days of slowed speech.  Vitals: VSS.   Neuros: O/x4. N/T to all extremities along with burning sensation. Tremors to BUE.   IV: PIV SL   Diet: Regular  Bowel status: no BM   : Voiding spontaneously  Skin: Intact  Pain: PRN tylenol, atarax, and tramadol given. Voltaren gel to hands, gabapentin cream to feet.  Activity: Up with 1, GB.   Plan: Continue to follow POC

## 2020-06-03 NOTE — PLAN OF CARE
Discharge Planner OT   Patient plan for discharge: ARU.  Current status: Patient motivated to participate in afternoon OT session and tolerated a lengthy 70 minutes of therapy. She was able to alternate between standing and sitting in front of sink to performing self care tasks at a CGA level. Bilateral hand tremors still present (right worse than left) and patient did require minimal assistance for manipulation of small ADL objects.  Barriers to return to prior living situation: Medical status, strength, endurance, balance.  Recommendations for discharge: ARU.  Rationale for recommendations: OT will continue to follow patient while in hospital to progress functional independence.       Entered by: Sophia Pineda 06/03/2020 3:03 PM

## 2020-06-03 NOTE — PLAN OF CARE
Discharge Planner PT   Patient plan for discharge: now agreeable to ARU, feels this will be the right plan for her  Current status: PT 6A: Pt progressing very well, continues to show good effort and motivation for PT. Pt somewhat slow to mobilize, CGA of 1 provided for safe sit<>stand transfers. Pt ambulated with CGA, FWW and WC follow with improved heel>toe gait B and foot clearance B, achieved up to 20 ft, 110 ft, 150 ft with 1 brief standing rest break. Further cues provided for safe gait correction as needed. Pt had no overt buckling. With stairs pt able to progress to stepping up and down on 1st step x 2 reps with B rail support and CGA-ROMAN of 1-2. Pt did not feel ready to progress to further stairs but was pleased with her progress today. /85. Pt able to progress to IND with majority of seated ex, performed 10-20 reps B.   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 Pt would be able to tolerate three hours of therapy per day.   Entered by: Piper Curran 06/03/2020 4:19 PM

## 2020-06-04 ENCOUNTER — APPOINTMENT (OUTPATIENT)
Dept: OCCUPATIONAL THERAPY | Facility: CLINIC | Age: 30
End: 2020-06-04
Payer: COMMERCIAL

## 2020-06-04 ENCOUNTER — APPOINTMENT (OUTPATIENT)
Dept: PHYSICAL THERAPY | Facility: CLINIC | Age: 30
End: 2020-06-04
Payer: COMMERCIAL

## 2020-06-04 LAB
GLUCOSE BLDC GLUCOMTR-MCNC: 92 MG/DL (ref 70–99)
METHYLMALONATE SERPL-SCNC: 0.15 UMOL/L (ref 0–0.4)

## 2020-06-04 PROCEDURE — 97535 SELF CARE MNGMENT TRAINING: CPT | Mod: GO

## 2020-06-04 PROCEDURE — 97530 THERAPEUTIC ACTIVITIES: CPT | Mod: GP | Performed by: PHYSICAL THERAPIST

## 2020-06-04 PROCEDURE — 25000132 ZZH RX MED GY IP 250 OP 250 PS 637: Performed by: STUDENT IN AN ORGANIZED HEALTH CARE EDUCATION/TRAINING PROGRAM

## 2020-06-04 PROCEDURE — 94150 VITAL CAPACITY TEST: CPT

## 2020-06-04 PROCEDURE — 00000146 ZZHCL STATISTIC GLUCOSE BY METER IP

## 2020-06-04 PROCEDURE — 97116 GAIT TRAINING THERAPY: CPT | Mod: GP | Performed by: PHYSICAL THERAPIST

## 2020-06-04 PROCEDURE — 12000001 ZZH R&B MED SURG/OB UMMC

## 2020-06-04 PROCEDURE — 40000275 ZZH STATISTIC RCP TIME EA 10 MIN

## 2020-06-04 RX ORDER — PREGABALIN 75 MG/1
150 CAPSULE ORAL 3 TIMES DAILY
Status: DISCONTINUED | OUTPATIENT
Start: 2020-06-04 | End: 2020-06-05 | Stop reason: HOSPADM

## 2020-06-04 RX ORDER — GABAPENTIN 600 MG/1
600 TABLET ORAL 3 TIMES DAILY
Status: DISCONTINUED | OUTPATIENT
Start: 2020-06-04 | End: 2020-06-04

## 2020-06-04 RX ADMIN — TRAMADOL HYDROCHLORIDE 50 MG: 50 TABLET, FILM COATED ORAL at 20:55

## 2020-06-04 RX ADMIN — ACETAMINOPHEN 650 MG: 325 TABLET, FILM COATED ORAL at 22:06

## 2020-06-04 RX ADMIN — TRAMADOL HYDROCHLORIDE 50 MG: 50 TABLET, FILM COATED ORAL at 08:33

## 2020-06-04 RX ADMIN — TRAMADOL HYDROCHLORIDE 50 MG: 50 TABLET, FILM COATED ORAL at 14:13

## 2020-06-04 RX ADMIN — OXYCODONE HYDROCHLORIDE AND ACETAMINOPHEN 1000 MG: 500 TABLET ORAL at 08:34

## 2020-06-04 RX ADMIN — HYDROXYZINE HYDROCHLORIDE 50 MG: 25 TABLET ORAL at 19:45

## 2020-06-04 RX ADMIN — MELATONIN 1000 UNITS: at 08:34

## 2020-06-04 RX ADMIN — TIZANIDINE 4 MG: 4 TABLET ORAL at 00:14

## 2020-06-04 RX ADMIN — TRAZODONE HYDROCHLORIDE 50 MG: 50 TABLET ORAL at 00:14

## 2020-06-04 RX ADMIN — PREGABALIN 100 MG: 50 CAPSULE ORAL at 08:34

## 2020-06-04 RX ADMIN — FOLIC ACID 1 MG: 1 TABLET ORAL at 08:34

## 2020-06-04 RX ADMIN — PREGABALIN 150 MG: 75 CAPSULE ORAL at 11:26

## 2020-06-04 RX ADMIN — Medication 1 TABLET: at 19:45

## 2020-06-04 RX ADMIN — MULTIPLE VITAMINS W/ MINERALS TAB 1 TABLET: TAB at 08:34

## 2020-06-04 RX ADMIN — POTASSIUM CHLORIDE 10 MEQ: 750 TABLET, EXTENDED RELEASE ORAL at 19:45

## 2020-06-04 RX ADMIN — ACETAMINOPHEN 650 MG: 325 TABLET, FILM COATED ORAL at 00:21

## 2020-06-04 RX ADMIN — HYDROXYZINE HYDROCHLORIDE 50 MG: 25 TABLET ORAL at 04:24

## 2020-06-04 RX ADMIN — HYDROXYZINE HYDROCHLORIDE 50 MG: 25 TABLET ORAL at 11:26

## 2020-06-04 RX ADMIN — PREGABALIN 150 MG: 75 CAPSULE ORAL at 19:45

## 2020-06-04 RX ADMIN — PREGABALIN 150 MG: 75 CAPSULE ORAL at 14:13

## 2020-06-04 RX ADMIN — RIVAROXABAN 20 MG: 20 TABLET, FILM COATED ORAL at 08:34

## 2020-06-04 RX ADMIN — ACETAMINOPHEN 650 MG: 325 TABLET, FILM COATED ORAL at 12:38

## 2020-06-04 RX ADMIN — THIAMINE HCL TAB 100 MG 100 MG: 100 TAB at 08:34

## 2020-06-04 RX ADMIN — ACETAMINOPHEN 650 MG: 325 TABLET, FILM COATED ORAL at 17:28

## 2020-06-04 RX ADMIN — ACETAMINOPHEN 650 MG: 325 TABLET, FILM COATED ORAL at 08:33

## 2020-06-04 RX ADMIN — Medication 1 G: at 04:24

## 2020-06-04 RX ADMIN — Medication 1 G: at 17:28

## 2020-06-04 RX ADMIN — Medication 1 G: at 11:33

## 2020-06-04 RX ADMIN — Medication 1 TABLET: at 08:34

## 2020-06-04 RX ADMIN — ACETAMINOPHEN 650 MG: 325 TABLET, FILM COATED ORAL at 04:24

## 2020-06-04 RX ADMIN — TRAMADOL HYDROCHLORIDE 50 MG: 50 TABLET, FILM COATED ORAL at 00:21

## 2020-06-04 RX ADMIN — POTASSIUM CHLORIDE 10 MEQ: 750 TABLET, EXTENDED RELEASE ORAL at 08:34

## 2020-06-04 ASSESSMENT — VISUAL ACUITY: OU: NORMAL ACUITY

## 2020-06-04 ASSESSMENT — ACTIVITIES OF DAILY LIVING (ADL)
ADLS_ACUITY_SCORE: 23
ADLS_ACUITY_SCORE: 21
ADLS_ACUITY_SCORE: 21
ADLS_ACUITY_SCORE: 23
ADLS_ACUITY_SCORE: 21
ADLS_ACUITY_SCORE: 23

## 2020-06-04 ASSESSMENT — MIFFLIN-ST. JEOR: SCORE: 1880.28

## 2020-06-04 ASSESSMENT — PAIN DESCRIPTION - DESCRIPTORS: DESCRIPTORS: PINS AND NEEDLES

## 2020-06-04 NOTE — PLAN OF CARE
Status: Admitted with neuropathic pain, new RUE tremor, and two days of slowed speech.  Vitals: VSS.   Neuros: O/x4. N/T to all extremities along with burning sensation. Tremors to BUE.   IV: PIV SL   Diet: Regular  Bowel status: Small BM x1  : Voiding spontaneously  Skin: Intact  Pain: PRN tylenol, atarax, and tramadol given. Voltaren gel to hands, gabapentin cream to feet.  Activity: Up with 1, GB.   Plan: Continue to follow POC

## 2020-06-04 NOTE — PROGRESS NOTES
"[facility], [place]   Neurology Daily Note  Hilaria Bonner  [id number]  06/04/2020    Subjective:  Requesting gabapentin overnight, was given capsaicin cream for hands which patient states was \"terrible.\" Has resulted in worsening pain and stiffening in hands. She states that even the diclofenac gel and the petrolatum jelly increased burning sensation. Gabapentin cream is still helping with pain in feet, but has not improved pain in hands.     Objective   Physical Examination   Vitals: /72 (BP Location: Right arm)   Pulse 107   Temp 98.7  F (37.1  C) (Oral)   Resp 18   Ht 1.676 m (5' 6\")   Wt 111.3 kg (245 lb 6.4 oz)   SpO2 100%   BMI 39.61 kg/m       General: Adult female patient, sitting up in bed in no acute distress  HEENT: NC/AT, no icterus, op pink and moist  Chest: non-labored on RA  Extremities: warm, no edema  Skin: No rashes or lesions    Neuro:  Mental status: Awake, alert, attentive, oriented to self, time, place, and circumstance. Language improved in speed/fluency. Intact comprehension of complex commands, naming and repetition. Asks many relevant questions.    Cranial nerves: VFF, conjugate gaze, EOMI, face symmetric, shoulder shrug strong, tongue/uvula midline, no dysarthria.   Motor: Normal bulk and tone. Occasional tremor of distal RUE; distractable. 5/5 strength in 4/4 extremities.   Reflexes: 2+ and symmetric biceps, brachioradialis, triceps. 1 in bilateral patellar, absent in achilles. No clonus.   Sensory: Diminished to light touch over RUE and RLE, vibratory sense extinguishes in 6-7 sec in all four extremities.  Gait: Deferred    Investigations  All pertinent labs and imaging have been independently reviewed.     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 MRI brain and cervical spine normal. CSF protein, glucose, and cell counts normal. ESR and CRP elevated, ANH weakly positive. Differential includes post-infectious autoimmune neuropathy in the setting of recent COVID-19 infection. Rheumatologic etiology also possible given family history of SLE and RA, though RF, dsDNA, SSA, and SSB are negative this admission. Highest suspicion at this point is for vitamin deficiency given h/o bariatric surgery, noncompliance with folate supplementation, and recent poor PO intake related to acute illness. Folate remarkably low at 1.4. Vitamins E, B6, and 12 wnl (of note, receives monthly B12 injections with last one on 5/20). Supplemented throughout this admission with high-dose thiamine and folate.  - thiamine 100 mg daily  - folic acid 1 mg PO daily  - Citracal 1 tablet PO daily  - Thera-vit 1 tablet PO daily  - vitamin C 1,000 mg daily  - vitamin D3 1,000 units daily  - increase pregabalin to 150 mg TID  - start amitriptyline 25 mg at bedtime  - gabapentin topical cream  - tramadol 50 mg TID prn  - tizanidine 4 mg at bedtime prn  - PT/OT as tolerated  - Follow up labs: MMA, lupus anticoagulant, and ganglioside antibody  - Follow up with neurology in 1-2 months as outpatient  - Consider skin biopsy and/or EMG if not improved by that time    #Elevated lactate, resolved  Lactic acid 4.7 on admission, etiology unclear. Normalized to 1.3 with IV fluids.    #Elevated total bilirubin  #Elevated alkaline phosphatase  Afebrile and normal WBC make hepatobiliary pathology less likely, but recent uptrend in bilirubin is concerning for cholestatic process. RUQ US with possible hepatic steatosis;  patient endorses remote history of possible alcohol overuse. Currently has on average 4 drinks per week, feels this is no longer a problem.     #Constipation, improved  #Hemorrhoids  Multiple stools since admission, varying hardness and streaked with bright red blood. Hemorrhoids noted per nursing exam.   - Miralax daily  - senna-docusate BID prn  - Preparation H BID prn for hemorrhoids    #H/o COVID-19 infection (tested positive on 4/18 or 4/19)  Retested on admission, COVID-19 negative.    #Macrocytic anemia  No evidence of active bleeding. MCV elevated to 116 consistent with megaloblastic anemia likely 2/2 known h/o vitamin B12 deficiency (currently wnl at 502 after recent injection on 5/20). Iron studies notable for high iron, low iron binding capacity, and elevated ferritin likely reflect iron supplementation.    #Chronic chest pain  #Persistent tachycardia  #H/o cardiac arrest 2/2 PE  Present since cardiac arrest in 5/2019. Takes Tylenol at home which provides inconsistent relief. EKG on admission with sinus tachycardia. Troponin and D-dimer negative. BNP wnl, bedside ultrasound unremarkable. Patient has remained hemodynamically stable. Scheduled for coronary artery CT angiogram as outpatient tomorrow, but patient is currently hospitalized and does endorse ongoing chest pain.  - CT coronary angiogram completed 6/1, unremarkable  - EKG and troponin if develops new or worsening symptoms    #H/o PE  Takes Xarelto PTA. Endorses compliance. INR goal 2.0-3.0, was 1.57 on admission. CT negative for PE on 5/30. PTA Xarelto held from admission until lumbar puncture on 6/2.   - PTA Xarelto 20 mg with breakfast      FEN: Regular diet  PPx: SCDs, PTA Xarelto  Code: FULL    Dispo: Medically ready for discharge to ARU    Patient was seen and discussed with attending Dr. Upton.    Keysha Steven MD  Neurology PGY-2    Attending physician: I saw and evaluated the patient with Dr. Steven and I agree with her findings and  plan of care as documented above, with the following additions.    Patient continuing to struggle with neuropathic pain in the hands; will try increasing pregabalin to 150 mg po TID and start amitriptyline 25 mg po at bedtime (the latter may help with sleep as well).    Disposition: Medically ready for discharge; awaiting ARU placement    Channing Upton MD   of Neurology

## 2020-06-04 NOTE — PLAN OF CARE
"Status: Pt admitted for neuropathic pain, progressive weakness, slowed speech and tremors  Vitals: VSS on RA  Neuros: AO4, strength 5/5 throughout. \"Burning\"/numbness from bilateral wrists down to fingers, decreased sensation bilateral feet. RUE tremor   IV: PIV SL  Resp/trach: LS clear  Diet: Regular diet, tolerating well  Bowel status: BM x 1 this AM  : Voiding spontaneously   Skin: Intact  Pain: Neuropathic pain controlled with PRN Tramadol, Tylenol, and scheduled gabapentin cream  Activity: Up with assist of 1, GB and walker. Ambulated in halls x 3  Plan: Therapies recommending ARU, continue to monitor and follow POC    "

## 2020-06-04 NOTE — PLAN OF CARE
OT/6A - Discharge Planner OT   Patient plan for discharge: ARU  Current status: Close SBA for toilet transfer from raised seat height to allow for increased independence.  Pt demos ability to open/close twist caps with min A, increased pain/difficulty when textures present or increased pressure required.  Encouraged pt to use dycem, built up foam handle, and/or larger joints in UE when able.   Barriers to return to prior living situation: pain - pt reports feeling like she is holding a ball of fire in each hand, strength, activity tolerance  Recommendations for discharge: ARU  Rationale for recommendations: Pt is motivated for therapy and demonstrates needs for multiple therapy disciplines. Pt would tolerate 3 hours of therapy daily with adequate rest breaks, as she is demonstrating tolerance for long therapy sessions at this time while inpatient.        Entered by: Maame Love 06/04/2020 9:33 AM

## 2020-06-04 NOTE — INTERIM SUMMARY
[facility] Pre-Admission Screen    Referral Source: [facility] 6A  Admit date to referring facility: 5/29/2020    Rehab Diagnosis: Neurologic conditions:03.3 Polyneuropathy-Small-fiber sensory neuropathy, suspected 2/2 nutritional deficiency in setting of recent COVID-19 infection.     Justification for Acute Inpatient Rehabilitation  Hilaria Bonner is a 29 year old year old female with complex medical history including morbid obesity, sleeve gastrectomy (3/2019), PE complicated by cardiac arrest (5/2019) now on Xarelto, recent COVID 19 infection (April) presented 5/29/2020 with 4 weeks of paresthesias in extremities and 1 week of neuropathic pain, new RUE tremor, and two days of slowed speech. MRI brain and cervical spine normal. CSF protein, glucose, and cell counts normal. ESR and CRP elevated, ANH weakly positive. Differential included post-infectious autoimmune neuropathy in the setting of recent COVID-19 infection. Rheumatologic etiology also possible given family history of SLE and RA, though RF, dsDNA, SSA, and SSB are negative this admission. Highest suspicion at this point is for vitamin deficiency given h/o bariatric surgery, noncompliance with folate supplementation, and recent poor PO intake related to acute illness due to COVID 19 infection. Pt is medically stable and ready for admission to acute rehab.     Patient requires an intensive inpatient rehab program for close medical management and to address the following acute impairments: pain,impaired strength, impaired activity tolerance and impaired balance, impaired ADL's, impaired transfers and impaired mobility.     Current Active Medical Management Needs/Risks for Clinical Complications  The patient requires the high level of rehabilitation physician supervision that accompanies the provision of intensive rehabilitation therapy.  The patient needs the services of the rehabilitation physician to assess the patient medically  and functionally and to modify the course of treatment as needed to maximize the patient's capacity to benefit from the rehabilitation process. Manage & assess neuropathy suspected d/t nutritional deficiency-Thiamine, folic acid, citracal, thera-vit tablet, vitamin C, vitamin D3, pregabalin, amitriptyline, gabapentin, tramadol, tizanidine; monitor lactate-Lactic acid 4.7 on admission, etiology unclear. Normalized to 1.3 with IV fluids; Monitor bilirubin and alkaline phosphatase-Afebrile and normal WBC make hepatobiliary pathology less likely, but recent uptrend in bilirubin is concerning for cholestatic process. RUQ US with possible hepatic steatosis; manage & assess constipation and hemorrhoids-Miralax daily, senna docusate BID prn, preparation H BID prn for hemorrhoids; monitor macrocytic anemia; Monitor chronic chest pain and persistent tachycardia-EKG and troponin if develops new or worsening symptoms.    Past Medical/Surgical History   Surgery in the past 100 days: No   Additional relevant past medical history: cardiac arrest, pulmonary embolus, COVID-19, sleeve gastrectomy.    Level of Functioning prior to Admission:  Home Environment  Lives with: child(lavell), dependent(10 year old and 3 year old )  Living arrangements: apartment  Home accessibility: stairs to enter home  Stairs to enter home: 1  Stairs to negotiate within home: 8  Stair railings at home:    Living environment comments:Pt lives in apartment with 2 children. Their dad is involved but does not live with them. Parents are helping with children while pt is hospitalized.  Equipment currently used at home: none  Activity/exercise/self-care comment: Prior to initial onset of weakness pt was independent; notes that in the last 2 days she has required more A for some ADLs/mobility. 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      Ambulation:  0-->independent  Transferrin-->independent  Toiletin-->independent  Bathin-->independent  Dressin-->independent   Eatin-->independent  Communication: 0-->understands/communicates without difficulty  Swallowin-->swallows foods/liquids without difficulty  Cognition: 0 - no cognition issues reported  Prior Functional Level Comment: started using a walker for 2 days prior to admission. Was getting more and more unsteady. She is normally IND at baseline with mobility and ADL's      Current Level of Function grid:  GG Scale   PT Most Recent Goals for Rehab   Bed Rolling 4 Supervision or touching assitance 6 Independent   Supine to Sit 4 Supervision or touching assitance 6 Independent   Sit to Stand 4 Supervision or touching assitance 6 Independent   Transfer 4 Supervision or touching assitance 6 Independent   Ambulation 4 Supervision or touching assitance 6 Independent   Stairs 1 Dependent 6 Independent     OT Most Recent Goals for Rehab   Feeding 5 Setup or clean-up assistance 6 Independent   Grooming 3 Partial/moderate assistance 6 Independent   Bathing 3 Partial/moderate assistance 6 Independent   Upper Body Dressing 3 Partial/moderate assistance 6 Independent   Lower Body Dressing 3 Partial/moderate assistance 6 Independent   Toileting 5 Setup or clean-up assistance 6 Independent   Toilet Transfer 4 Supervision or touching assitance 6 Independent   Tub/Shower Transfer 0 Not completed 6 Independent   Cognitive Not impaired, A&Ox4 N/A-not impaired     SLP Most Recent Goals for Rehab   Dysphagia Not impaired-Regular with thin N/A-not impaired       Summary Statement:  Hilaria is making progress, is very motivated and can tolerate 3 hours of daily therapy. She demos ability to open/close twist caps with min A. Increased pain/difficulty when textures present or increased pressure required and she has bilateral hand tremors (right > left). She requires close SBA for toilet transfer from a raised  toilet seat. She ambulates with CGA with FWW on level ground with increased standing rest breaks. She has continues to demonstrate improved heel>toe gait bilaterally along with bilateral foot clearance. Further cues provided for safe gait correction as needed. She demonstrates very slow jessica. Therapy continues to monitor for buckling and loss of balance which has occurred during therapy sessions. She requires min A of 2 to ascend 2/descend 3 steps.     Expected Therapies and Services required during Inpatient Rehab admission  Intensity of Therapy: Patient requires intensive therapies not available in a lesser level of care. Patient is motivated, making gains, and can tolerate 3 hours of therapy a day.  Physical Therapy: 90 minutes per day, at least 5 days a week for 7 days  Occupational Therapy: 90 minutes per day, at least 5 days a week for 7 days  Speech and Language Therapy: No speech therapy needs identified.   Rehabilitation Nursing Needs: Patient requires 24 hour Rehab Nursing to manage vitals, medication education, positioning and carryover of new rehab techniques, provide a safe environment for a patient at risk for falls d/t weakness, complete pain management as directed by physician.    Precautions/restrictions/special needs:   Precautions: fall precautions      Special Needs: History of COVID    Expected Level of Improvement:: IND with house hold ambulation, stairs and transfers, Mod I ambulation in community and Mod I with ADL's.   Expected Length of time to achieve: 7 days    Anticipated Discharge Needs:  Anticipated Discharge Destination: Home  Anticipated Discharge Support: Family member  24/7 support available : Unknown  Identified caregiver(s):  Pt's mom is available to assist as needed  Anticipated Discharge Needs: home with homecare and home vs. Outpatient therapy.    - Follow up labs: MMA, lupus anticoagulant, and ganglioside antibody  - Follow up with neurology in 1-2 months as outpatient  -  Consider skin biopsy and/or EMG if not improved by that time  - Coronary artery CT angiogram as outpatient     Liaison Signature:        Physician statement of review and agreement:  I have reviewed and am in agreement of the need for IRF stay to address above functional and medical needs. In addition to above statements address, Patient requires intensive active and ongoing therapeutic intervention and multiple therapies; Patient requires medical supervision; Expected to actively participate in the intensive rehab program; Sufficiently stable to actively participate; Expectation for measurable improvement in functional capacity or adaption to impairments.    Physician Signature:

## 2020-06-04 NOTE — PROGRESS NOTES
Rehab Admissions:  I spoke with Hilaria over the phone to discuss acute rehab. She expressed her motivation to really push herself each day to get stronger.  We discussed therapy requirements for this level of care, ELOS~7 days, support for home after discharge and clothing to bring to acute rehab. I answered all of her questions.     Thank you for the referral, we will continue to follow this patient for post acute placement.     Determination of admission is based upon the patient's need for an intensive, interdisciplinary approach to rehabilitation, their ability to progress, their ability to tolerate intensive therapies, their need for daily physician supervision, their need for twenty four hour nursing assistance, and their ability and willingness to participate in such a program.    Nita Ramos CM  Rehab Liaison/  [facility]  6/4/2020    5:53 PM

## 2020-06-04 NOTE — PLAN OF CARE
Discharge Planner PT   Patient plan for discharge: ARU  Current status: SBA for sit to/from stand from armchair & W/C with use of WW; CGA to amb on level 200 ft with very slow jessica, increased time in double stance; min assist x 2 to ascend/descend 3 steps with bilateral rails with step-to pattern  Barriers to return to prior living situation: medical status; assist for mobility; weakness; deconditioning  Recommendations for discharge: ARU  Rationale for recommendations: highly motivated to participate; has multiple rehab needs & would benefit from intensive skilled PT intervention to address mobility deficits, improve strength and activity tolerance       Entered by: Ericka Jolly 06/04/2020 2:51 PM

## 2020-06-04 NOTE — PROGRESS NOTES
Status: Admitted with neuropathic pain.  New RUE tremor. Slowed speech x 2 days.   Vitals: VSS  Neuros: A&Ox4.  N/T to all extremities, burning sensation to Marcelo UE.  Right UE tremor.    IV: PIV SL  Resp/trach: WDL  Diet: Regular  Bowel status: BS+.  LMB 6/3  : Voiding spont, ambulates to bathroom.  Skin: intact  Pain: Pain to hands and feet.  Getting PRN Tylenol, atarax, tramadol.  Gabapentin cream to feet.  Voltaren get to hands.    Activity: Up with A1, GB.    Plan: ARU.  Continue with current POC.

## 2020-06-05 ENCOUNTER — HOSPITAL ENCOUNTER (INPATIENT)
Facility: CLINIC | Age: 30
LOS: 6 days | Discharge: HOME | End: 2020-06-11
Attending: PHYSICAL MEDICINE & REHABILITATION | Admitting: PHYSICAL MEDICINE & REHABILITATION
Payer: COMMERCIAL

## 2020-06-05 ENCOUNTER — APPOINTMENT (OUTPATIENT)
Dept: PHYSICAL THERAPY | Facility: CLINIC | Age: 30
End: 2020-06-05
Payer: COMMERCIAL

## 2020-06-05 VITALS
DIASTOLIC BLOOD PRESSURE: 73 MMHG | SYSTOLIC BLOOD PRESSURE: 120 MMHG | WEIGHT: 251 LBS | BODY MASS INDEX: 40.34 KG/M2 | RESPIRATION RATE: 16 BRPM | HEART RATE: 111 BPM | HEIGHT: 66 IN | OXYGEN SATURATION: 97 % | TEMPERATURE: 98.8 F

## 2020-06-05 DIAGNOSIS — E53.8 VITAMIN B12 DEFICIENCY (NON ANEMIC): ICD-10-CM

## 2020-06-05 DIAGNOSIS — M54.6 ACUTE BILATERAL THORACIC BACK PAIN: ICD-10-CM

## 2020-06-05 DIAGNOSIS — Z98.84 S/P LAPAROSCOPIC SLEEVE GASTRECTOMY: ICD-10-CM

## 2020-06-05 DIAGNOSIS — K59.00 CONSTIPATION, UNSPECIFIED CONSTIPATION TYPE: ICD-10-CM

## 2020-06-05 DIAGNOSIS — F41.9 ANXIETY: ICD-10-CM

## 2020-06-05 DIAGNOSIS — G62.9 POLYNEUROPATHY: ICD-10-CM

## 2020-06-05 DIAGNOSIS — E55.9 VITAMIN D DEFICIENCY: ICD-10-CM

## 2020-06-05 DIAGNOSIS — R20.2 PARESTHESIAS: ICD-10-CM

## 2020-06-05 DIAGNOSIS — I10 BENIGN ESSENTIAL HYPERTENSION: Primary | ICD-10-CM

## 2020-06-05 DIAGNOSIS — M54.2 CERVICALGIA: ICD-10-CM

## 2020-06-05 PROBLEM — K64.9 HEMORRHOIDS, UNSPECIFIED HEMORRHOID TYPE: Status: ACTIVE | Noted: 2020-06-05

## 2020-06-05 LAB
GD1B GANGL IGG SER IA-ACNC: 13 IV (ref 0–50)
GD1B GANGL IGM SER IA-ACNC: 6 IV (ref 0–50)
GLUCOSE BLDC GLUCOMTR-MCNC: 114 MG/DL (ref 70–99)
GLUCOSE BLDC GLUCOMTR-MCNC: 82 MG/DL (ref 70–99)
GM1 GANGL IGG SER IA-ACNC: 7 IV (ref 0–50)
GM1 GANGL IGM SER IA-ACNC: 4 IV (ref 0–50)
GQ1B GANGL IGG SER IA-ACNC: 3 IV (ref 0–50)
GQ1B GANGL IGM SER IA-ACNC: 4 IV (ref 0–50)
LA PPP-IMP: NEGATIVE

## 2020-06-05 PROCEDURE — 12800006 ZZH R&B REHAB

## 2020-06-05 PROCEDURE — 25000132 ZZH RX MED GY IP 250 OP 250 PS 637: Performed by: STUDENT IN AN ORGANIZED HEALTH CARE EDUCATION/TRAINING PROGRAM

## 2020-06-05 PROCEDURE — 97530 THERAPEUTIC ACTIVITIES: CPT | Mod: GP

## 2020-06-05 PROCEDURE — 00000146 ZZHCL STATISTIC GLUCOSE BY METER IP

## 2020-06-05 PROCEDURE — 25000132 ZZH RX MED GY IP 250 OP 250 PS 637: Performed by: PHYSICIAN ASSISTANT

## 2020-06-05 PROCEDURE — 97110 THERAPEUTIC EXERCISES: CPT | Mod: GP

## 2020-06-05 PROCEDURE — 97116 GAIT TRAINING THERAPY: CPT | Mod: GP

## 2020-06-05 RX ORDER — TRAMADOL HYDROCHLORIDE 50 MG/1
50 TABLET ORAL EVERY 6 HOURS PRN
Status: ON HOLD | DISCHARGE
Start: 2020-06-05 | End: 2020-06-10

## 2020-06-05 RX ORDER — POTASSIUM CHLORIDE 750 MG/1
10 TABLET, EXTENDED RELEASE ORAL 2 TIMES DAILY
Status: DISCONTINUED | OUTPATIENT
Start: 2020-06-05 | End: 2020-06-09

## 2020-06-05 RX ORDER — POLYETHYLENE GLYCOL 3350 17 G/17G
17 POWDER, FOR SOLUTION ORAL DAILY PRN
Status: DISCONTINUED | OUTPATIENT
Start: 2020-06-05 | End: 2020-06-11 | Stop reason: HOSPADM

## 2020-06-05 RX ORDER — PREGABALIN 150 MG/1
150 CAPSULE ORAL 3 TIMES DAILY
Status: ON HOLD | DISCHARGE
Start: 2020-06-05 | End: 2020-06-10

## 2020-06-05 RX ORDER — HYDROXYZINE HYDROCHLORIDE 25 MG/1
25-50 TABLET, FILM COATED ORAL EVERY 6 HOURS PRN
Status: DISCONTINUED | OUTPATIENT
Start: 2020-06-05 | End: 2020-06-11 | Stop reason: HOSPADM

## 2020-06-05 RX ORDER — FOLIC ACID 1 MG/1
1 TABLET ORAL DAILY
Status: DISCONTINUED | OUTPATIENT
Start: 2020-06-06 | End: 2020-06-11 | Stop reason: HOSPADM

## 2020-06-05 RX ORDER — NALOXONE HYDROCHLORIDE 0.4 MG/ML
.1-.4 INJECTION, SOLUTION INTRAMUSCULAR; INTRAVENOUS; SUBCUTANEOUS
Status: DISCONTINUED | OUTPATIENT
Start: 2020-06-05 | End: 2020-06-11 | Stop reason: HOSPADM

## 2020-06-05 RX ORDER — VITAMIN B COMPLEX
1000 TABLET ORAL DAILY
Status: DISCONTINUED | OUTPATIENT
Start: 2020-06-06 | End: 2020-06-11 | Stop reason: HOSPADM

## 2020-06-05 RX ORDER — PREGABALIN 75 MG/1
150 CAPSULE ORAL 3 TIMES DAILY
Status: DISCONTINUED | OUTPATIENT
Start: 2020-06-05 | End: 2020-06-11 | Stop reason: HOSPADM

## 2020-06-05 RX ORDER — TIZANIDINE 2 MG/1
4 TABLET ORAL
Status: DISCONTINUED | OUTPATIENT
Start: 2020-06-05 | End: 2020-06-11 | Stop reason: HOSPADM

## 2020-06-05 RX ORDER — LANOLIN ALCOHOL/MO/W.PET/CERES
100 CREAM (GRAM) TOPICAL DAILY
Status: DISCONTINUED | OUTPATIENT
Start: 2020-06-06 | End: 2020-06-11 | Stop reason: HOSPADM

## 2020-06-05 RX ORDER — CYANOCOBALAMIN 1000 UG/ML
1000 INJECTION, SOLUTION INTRAMUSCULAR; SUBCUTANEOUS
Status: DISCONTINUED | OUTPATIENT
Start: 2020-06-06 | End: 2020-06-06

## 2020-06-05 RX ORDER — ACETAMINOPHEN 325 MG/1
650 TABLET ORAL EVERY 6 HOURS PRN
Status: DISCONTINUED | OUTPATIENT
Start: 2020-06-05 | End: 2020-06-11 | Stop reason: HOSPADM

## 2020-06-05 RX ORDER — LANOLIN ALCOHOL/MO/W.PET/CERES
100 CREAM (GRAM) TOPICAL DAILY
Qty: 90 TABLET | Refills: 3 | Status: ON HOLD | DISCHARGE
Start: 2020-06-05 | End: 2020-06-10

## 2020-06-05 RX ORDER — HYDROXYZINE HYDROCHLORIDE 25 MG/1
25-50 TABLET, FILM COATED ORAL EVERY 6 HOURS PRN
Status: ON HOLD | DISCHARGE
Start: 2020-06-05 | End: 2020-06-10

## 2020-06-05 RX ORDER — CYANOCOBALAMIN 1000 UG/ML
1000 INJECTION, SOLUTION INTRAMUSCULAR; SUBCUTANEOUS
Status: DISCONTINUED | OUTPATIENT
Start: 2020-06-05 | End: 2020-06-05

## 2020-06-05 RX ORDER — ASCORBIC ACID 500 MG
1000 TABLET ORAL DAILY
Status: DISCONTINUED | OUTPATIENT
Start: 2020-06-06 | End: 2020-06-11 | Stop reason: HOSPADM

## 2020-06-05 RX ORDER — VITAMIN B COMPLEX
1000 TABLET ORAL DAILY
Status: DISCONTINUED | OUTPATIENT
Start: 2020-06-06 | End: 2020-06-05

## 2020-06-05 RX ORDER — TRAMADOL HYDROCHLORIDE 50 MG/1
50 TABLET ORAL EVERY 6 HOURS PRN
Status: DISCONTINUED | OUTPATIENT
Start: 2020-06-05 | End: 2020-06-11 | Stop reason: HOSPADM

## 2020-06-05 RX ORDER — MULTIVITAMIN,THERAPEUTIC
1 TABLET ORAL DAILY
Status: DISCONTINUED | OUTPATIENT
Start: 2020-06-06 | End: 2020-06-11 | Stop reason: HOSPADM

## 2020-06-05 RX ADMIN — AMITRIPTYLINE HYDROCHLORIDE 25 MG: 25 TABLET, FILM COATED ORAL at 21:56

## 2020-06-05 RX ADMIN — TRAMADOL HYDROCHLORIDE 50 MG: 50 TABLET, FILM COATED ORAL at 10:18

## 2020-06-05 RX ADMIN — MELATONIN 1000 UNITS: at 08:36

## 2020-06-05 RX ADMIN — Medication 1 MG: at 21:56

## 2020-06-05 RX ADMIN — THIAMINE HCL TAB 100 MG 100 MG: 100 TAB at 08:37

## 2020-06-05 RX ADMIN — ACETAMINOPHEN 650 MG: 325 TABLET, FILM COATED ORAL at 13:36

## 2020-06-05 RX ADMIN — TRAZODONE HYDROCHLORIDE 50 MG: 50 TABLET ORAL at 00:09

## 2020-06-05 RX ADMIN — MULTIPLE VITAMINS W/ MINERALS TAB 1 TABLET: TAB at 08:37

## 2020-06-05 RX ADMIN — Medication 1 G: at 03:02

## 2020-06-05 RX ADMIN — PREGABALIN 150 MG: 75 CAPSULE ORAL at 13:37

## 2020-06-05 RX ADMIN — POTASSIUM CHLORIDE 10 MEQ: 750 TABLET, EXTENDED RELEASE ORAL at 21:56

## 2020-06-05 RX ADMIN — TIZANIDINE 4 MG: 4 TABLET ORAL at 00:10

## 2020-06-05 RX ADMIN — TRAMADOL HYDROCHLORIDE 50 MG: 50 TABLET, FILM COATED ORAL at 19:32

## 2020-06-05 RX ADMIN — ACETAMINOPHEN 650 MG: 325 TABLET, FILM COATED ORAL at 03:14

## 2020-06-05 RX ADMIN — ACETAMINOPHEN 650 MG: 325 TABLET, FILM COATED ORAL at 08:36

## 2020-06-05 RX ADMIN — HYDROXYZINE HYDROCHLORIDE 50 MG: 25 TABLET ORAL at 03:15

## 2020-06-05 RX ADMIN — PREGABALIN 150 MG: 75 CAPSULE ORAL at 19:32

## 2020-06-05 RX ADMIN — ACETAMINOPHEN 650 MG: 325 TABLET, FILM COATED ORAL at 19:32

## 2020-06-05 RX ADMIN — RIVAROXABAN 20 MG: 20 TABLET, FILM COATED ORAL at 08:37

## 2020-06-05 RX ADMIN — HYDROXYZINE HYDROCHLORIDE 50 MG: 25 TABLET ORAL at 10:18

## 2020-06-05 RX ADMIN — Medication 1 TABLET: at 08:37

## 2020-06-05 RX ADMIN — PREGABALIN 150 MG: 75 CAPSULE ORAL at 08:36

## 2020-06-05 RX ADMIN — POLYETHYLENE GLYCOL 3350 17 G: 17 POWDER, FOR SOLUTION ORAL at 08:36

## 2020-06-05 RX ADMIN — TIZANIDINE 4 MG: 2 TABLET ORAL at 21:56

## 2020-06-05 RX ADMIN — FOLIC ACID 1 MG: 1 TABLET ORAL at 08:37

## 2020-06-05 RX ADMIN — POTASSIUM CHLORIDE 10 MEQ: 750 TABLET, EXTENDED RELEASE ORAL at 08:37

## 2020-06-05 RX ADMIN — OXYCODONE HYDROCHLORIDE AND ACETAMINOPHEN 1000 MG: 500 TABLET ORAL at 08:36

## 2020-06-05 RX ADMIN — Medication 1 G: at 10:18

## 2020-06-05 RX ADMIN — TRAMADOL HYDROCHLORIDE 50 MG: 50 TABLET, FILM COATED ORAL at 03:15

## 2020-06-05 RX ADMIN — Medication 1 TABLET: at 19:32

## 2020-06-05 RX ADMIN — HYDROXYZINE HYDROCHLORIDE 50 MG: 25 TABLET ORAL at 17:19

## 2020-06-05 RX ADMIN — Medication 25 MG: at 22:08

## 2020-06-05 ASSESSMENT — ACTIVITIES OF DAILY LIVING (ADL)
NUMBER_OF_TIMES_PATIENT_HAS_FALLEN_WITHIN_LAST_SIX_MONTHS: 1
SWALLOWING: 0-->SWALLOWS FOODS/LIQUIDS WITHOUT DIFFICULTY
BATHING: 0-->INDEPENDENT
ADLS_ACUITY_SCORE: 23
TRANSFERRING: 1-->ASSISTIVE EQUIPMENT
ADLS_ACUITY_SCORE: 24
DRESS: 0-->INDEPENDENT
ADLS_ACUITY_SCORE: 24
TOILETING: 1-->ASSISTIVE EQUIPMENT
RETIRED_COMMUNICATION: 0-->UNDERSTANDS/COMMUNICATES WITHOUT DIFFICULTY
RETIRED_EATING: 0-->INDEPENDENT
FALL_HISTORY_WITHIN_LAST_SIX_MONTHS: YES
ADLS_ACUITY_SCORE: 24
AMBULATION: 1-->ASSISTIVE EQUIPMENT
COGNITION: 0 - NO COGNITION ISSUES REPORTED
WHICH_OF_THE_ABOVE_FUNCTIONAL_RISKS_HAD_A_RECENT_ONSET_OR_CHANGE?: AMBULATION;TRANSFERRING

## 2020-06-05 ASSESSMENT — MIFFLIN-ST. JEOR: SCORE: 1899.33

## 2020-06-05 NOTE — H&P
"    [facility]   Acute Rehabilitation Unit  Admission History and Physical    CHIEF COMPLAINT   \"need to get rehab so I can get home\"    HISTORY OF PRESENT ILLNESS  Hilaria Bonner is a 29 year old woman with a past medical history of PE with associated PEA cardiac arrest, morbid obesity s/p sleeve gastrectomy (3/2019), pancreatitis, and COVID 4/2020 who presented with four week history of paresthesias. Reportedly progressive in nature with reported slow speech and right arm tremor. Underwent extensive workup per neurology including: MRI brain, cervical spine, LP which were unremarkable. Rheumatologic workup overall unremarkable.  ESR and CRP elevated ANH weakly positive. While differential includes post infectious autoimmune neuropathy, rheumatologic etiology, felt most likely vitamin deficiency in setting of known bariatric surgery, recent poor po intake and non compliance with folate supplementation with folate level 1.4. Both folate and additional supplementation was started, pain medications were titrated.     During her acute hospitalization, patient was seen and evaluated by PT and OT, who collectively recommended that patient would benefit from ongoing therapies in the acute inpatient rehabilitation setting.       In review of the therapy notes she is currently noted to have impaired strength, sensation,  activity tolerance, sba for toilet transfer with raised set, completing grooming hygiene with CGA, with min assist for ambulation. Goals for MOD I with basic adl and mobility.    On arrival to acute rehab reports she is doing ok, notes bilateral hand pain, intermittent foot pain, sensation impairments, impaired balance and strength.  Notes numbness around mouth is stable, previous difficulties with swallow, speech, and right sided weakness improved. Describes ongoing right > left tremor.  Notes chronic intermittent chest pain that is stable, denies n/v/d, sob, headaches, " dizziness and fevers.  Is eating well, and sleeping ok with use of trazodone. Motivated to work with therapy and get home.       PAST MEDICAL HISTORY   Reviewed and updated in Epic.  Past Medical History:   Diagnosis Date     Acute kidney injury (H) 2019     Cardiac arrest (H) 2019     Earache or other ear, nose, or throat complaint 12/15    tyroid     Pulmonary embolus (H) 2019       SURGICAL HISTORY  Reviewed and updated in Epic.  Past Surgical History:   Procedure Laterality Date     GYN SURGERY      2 C-sections     KNEE SURGERY  most recently - [id number]    3 surgeries in [place]     LAPAROSCOPIC GASTRIC SLEEVE N/A 3/26/2019    Procedure: Laparoscopic Sleeve Gastrectomy;  Surgeon: Luan Lpoez MD;  Location: [facility] OR     ORTHOPEDIC SURGERY      2 knee meniscus surgery       SOCIAL HISTORY  Reviewed and updated in Epic.  Marital Status: single  Living situation: lives with 2 young sons (3 & 10) in [facility] down 8 stairs to enter  Family support: identifies mom as support  Vocational History:nursing assistant  Tobacco use: ~ 5 cigarettes a day   Alcohol use: 2-4 drinks per week  Illicit drug use: denies  Social History     Socioeconomic History     Marital status: Single     Spouse name: Not on file     Number of children: Not on file     Years of education: Not on file     Highest education level: Not on file   Occupational History     Not on file   Social Needs     Financial resource strain: Not on file     Food insecurity     Worry: Not on file     Inability: Not on file     Transportation needs     Medical: Not on file     Non-medical: Not on file   Tobacco Use     Smoking status: Former Smoker     Years: 1.00     Start date: 2016     Last attempt to quit: 2018     Years since quittin.3     Smokeless tobacco: Never Used   Substance and Sexual Activity     Alcohol use: Not Currently     Alcohol/week: 0.0 standard drinks     Comment: occasional     Drug use: No     Sexual  activity: Yes     Partners: Male     Birth control/protection: Injection   Lifestyle     Physical activity     Days per week: Not on file     Minutes per session: Not on file     Stress: Not on file   Relationships     Social connections     Talks on phone: Not on file     Gets together: Not on file     Attends [employer] service: Not on file     Active member of club or organization: Not on file     Attends meetings of clubs or organizations: Not on file     Relationship status: Not on file     Intimate partner violence     Fear of current or ex partner: Not on file     Emotionally abused: Not on file     Physically abused: Not on file     Forced sexual activity: Not on file   Other Topics Concern     Parent/sibling w/ CABG, MI or angioplasty before 65F 55M? Not Asked   Social History Narrative     Not on file       FAMILY HISTORY  Reviewed and updated in Epic.  Family History   Problem Relation Age of Onset     Diabetes Father      Hypertension Father      Breast Cancer Sister 26        surgery only     Cancer Sister      Coronary Artery Disease Other         paternal aunt     Thyroid Disease Other         neice     Coronary Artery Disease Other      Cerebrovascular Disease Other      Breast Cancer Sister      Thyroid Disease Other      Cerebrovascular Disease No family hx of          PRIOR FUNCTIONAL HISTORY   Pt was independent with all ADLs/IADLs, transfers, mobility and gait.      MEDICATIONS  Scheduled meds  Facility-Administered Medications Prior to Admission   Medication Dose Route Frequency Provider Last Rate Last Dose     cyanocobalamin injection 1,000 mcg  1,000 mcg Intramuscular Q30 Days Crissy Madrigal, PAYumikoC         medroxyPROGESTERone (DEPO-PROVERA) syringe 150 mg  150 mg Intramuscular Q90 Days Bertha Montano NP   150 mg at 03/18/20 1151     Medications Prior to Admission   Medication Sig Dispense Refill Last Dose     acetaminophen-codeine (TYLENOL #3) 300-30 MG tablet Take 2 tablets by mouth  nightly as needed for severe pain 20 tablet 0      Calcium Citrate-Vitamin D 500-500 MG-UNIT CHEW Take 1 chew tab by mouth 2 times daily 180 tablet 3      CHANTIX STARTING MONTH AMADO 0.5 MG X 11 & 1 MG X 42 tablet         fluticasone (FLONASE) 50 MCG/ACT nasal spray 2 sprays        folic acid (FOLVITE) 1 MG tablet         gabapentin (NEURONTIN) 300 MG capsule Take 1 capsule (300 mg) by mouth nightly as needed 90 capsule 1      Multiple Vitamins-Minerals (MULTIVITAMIN ADULTS PO) Take 1 tablet by mouth daily        omeprazole (PRILOSEC) 20 MG DR capsule Take 1 capsule (20 mg) by mouth daily as needed (heartburn) 90 capsule 3      polyethylene glycol (MIRALAX/GLYCOLAX) powder Take 17 g (1 capful) by mouth daily 850 g 3      potassium chloride ER (KLOR-CON M) 10 MEQ CR tablet Take 1 tablet (10 mEq) by mouth 2 times daily 180 tablet 0      rivaroxaban ANTICOAGULANT (XARELTO ANTICOAGULANT) 20 MG TABS tablet Take 1 tablet (20 mg) by mouth daily (with dinner) 90 tablet 3      tiZANidine (ZANAFLEX) 4 MG tablet Take 1 tablet (4 mg) by mouth nightly as needed for muscle spasms 20 tablet 0      topiramate (TOPAMAX) 25 MG tablet Take 3 tablets (75 mg) by mouth daily 90 tablet 3      vitamin C (ASCORBIC ACID) 1000 MG TABS Take 1,000 mg by mouth        Vitamin D, Cholecalciferol, 25 MCG (1000 UT) CAPS Take 1,000 Units by mouth daily        vitamin D3 (CHOLECALCIFEROL) 94560 units (250 mcg) capsule Take 1 capsule (10,000 Units) by mouth daily (Patient not taking: Reported on 5/28/2020) 30 capsule 1      vitamin D3 (CHOLECALCIFEROL) 2000 units tablet Take 1 tablet by mouth daily (Patient not taking: Reported on 5/28/2020) 90 tablet 1      [DISCONTINUED] diclofenac (VOLTAREN) 1 % topical gel Place 2 g onto the skin 4 times daily (Patient not taking: Reported on 5/28/2020) 100 g 0      [DISCONTINUED] gabapentin (NEURONTIN) 100 MG capsule Take 1 capsule (100 mg) by mouth 2 times daily And 300 mg at bedtime (Patient not taking: Reported  "on 5/28/2020) 60 capsule 1      [DISCONTINUED] magic mouthwash (FIRST-MOUTHWASH BLM) compounding kit Take 30 mLs by mouth every 6 hours as needed for mouth sores (Patient not taking: Reported on 5/28/2020) 900 mL 2      [DISCONTINUED] pregabalin (LYRICA) 25 MG capsule 25 mg at bedtime for 3 days then 25 mg twice a day for 3 days then 25 mg three times a day 90 capsule 1      [DISCONTINUED] varenicline (CHANTIX AMADO) 0.5 MG X 11 & 1 MG X 42 tablet Take 0.5 mg tab daily for 3 days, THEN 0.5 mg tab twice daily for 4 days, THEN 1 mg twice daily. (Patient not taking: Reported on 5/28/2020) 53 tablet 0        ALLERGIES   No Known Allergies      REVIEW OF SYSTEMS  A 10 point ROS was performed and negative unless otherwise noted in HPI.     PHYSICAL EXAM  VITAL SIGNS:  BP (!) 130/93 (BP Location: Right arm)   Pulse 105   Temp 98.1  F (36.7  C) (Oral)   Resp 18   Ht 1.707 m (5' 7.2\")   Wt 113.9 kg (251 lb)   SpO2 99%   BMI 39.08 kg/m    BMI:  Estimated body mass index is 40.51 kg/m  as calculated from the following:    Height as of 5/29/20: 1.676 m (5' 6\").    Weight as of 6/4/20: 113.9 kg (251 lb).     General: awake alert nad  HEENT: mmm  Pulmonary: non labored clear on room air  Cardiovascular: reg tachy  Abdominal: soft non distended non tender  Extremities: warm, well perfused, mild ble edema in bilateral lower extremities, no tenderness in calves   MSK/neuro:   Mental Status:  alert and oriented x3   Cranial Nerves: grossly normal    Sensory: reported impaired sensation bilateral hands, feet, and right side sensation different from left on palpation.    Strength: 5/5 in all muscle groups of the upper and lower extremities, some limitation on hands due to pain.    Reflexes: flat   Chisholm's test: negative bilaterally    Babinski reflex: neutral    Tone per modified Shira Scale: none    Abnormal movements: None   Coordination: No dysmetria on finger to nose b/l    Speech: clear coherent   Cognition: linear " logical   Gait: not tested.   Skin: no noted bruising, right knee scar from past surgery.       LABS  Folate 1.4      IMPRESSION/PLAN:  Hilaria Bonner is a 29 year old woman with past medical history of PE with associated PEA arrest, morbid obesity s/p sleeve gastrectomy, pancreatitis, and recent COVID 19 who presented 5/29/20 with 4 week history of paresthesias, RUE tremor and slowed speech admitted and underwent extensive workup findings of which make vitamin deficiency highest on current differential. She was admitted to acute rehab on 6/5/20  for ongoing rehabilitation and medical management.       Admission to acute inpatient rehab polyneuropathy suspected 2/2 nutritional deficiency  Impairment group code: 03.3      1. PT, OT 90 minutes of each on a daily basis, in addition to rehab nursing and close management of physiatrist.      2. Impairment of ADL's: Noted to have impaired sensation, activity tolerance, and strength goals for MOD I to I with basic ADLS.     3. Impairment of mobility:  Noted to have impaired sensation, activity tolerance, and strength goals for MOD I to I with basic mobility.       4. 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 Underwent extensive workup per neurology positive findings:  ESR and CRP elevated, ANH weakly positive.Folate remarkably low at 1.4. LP and Brain/Cervical MRI neg.  Highest suspicion at this point is for vitamin deficiency given h/o bariatric surgery, noncompliance with folate supplementation, and recent poor PO intake related to acute illness. Differential includes post-infectious autoimmune neuropathy in the setting of recent COVID-19 infection. Rheumatologic etiology.   -continue thiamine 100 mg daily,  folic acid 1 mg PO daily,  Citracal 1 tablet PO daily, multivitamin, vitamin C 1,000 mg daily & vitamin D3 1,000  units daily  Pain: continue pregabalin to 150 mg TID (increased 6/4)  - start amitriptyline 25 mg at bedtime per neurology recs.   -  Continue gabapentin topical cream, tramadol 50 mg TID prn, tylenol prn, tizanidine 4 mg at bedtime prn  - PT/OT as tolerated  - Follow up with neurology in 1-2 months as outpatient    #Macrocytic anemia  Hgb. 8.6 5/30. No evidence of active bleeding. MCV elevated to 116 consistent with megaloblastic anemia likely 2/2 known h/o vitamin B12 deficiency (currently wnl at 502 after recent injection on 5/20). Iron studies notable for high iron, low iron binding capacity, and elevated ferritin likely reflect iron supplementation.  -trend.      #Chronic chest pain  #Persistent tachycardia  #H/o cardiac arrest 2/2 PE  Present since cardiac arrest in 5/2019. Takes Tylenol at home which provides inconsistent relief. EKG on admission with sinus tachycardia. Troponin and D-dimer negative. BNP wnl, bedside ultrasound unremarkable.- CT coronary angiogram completed 6/1, unremarkable  -monitor consider EKG and troponin if develops new/ worsening symptoms     #H/o PE  Takes Xarelto PTA.  CT negative for PE on 5/30. PTA Xarelto held from admission until lumbar puncture on 6/2.   -continue Xarelto 20 mg daily       #Constipation, improved  #Hemorrhoids  Multiple stools streaked with bright red blood. Hemorrhoids noted per nursing exam.   - Miralax daily prn, senna-docusate BID prn  - Preparation H BID prn for hemorrhoids     #H/o COVID-19 infection (tested positive on 4/18 or 4/19)   COVID-19 negative on admission to hospital.       5. Adjustment to disability:  Monitor mood  6. FEN: reg  7. Bowel: continent  8. Bladder: continent  9. DVT Prophylaxis: ambulation  10. GI Prophylaxis: reg diet.   11. Code: full  12. Disposition: home  13. ELOS:  ~7 days  14. Rehab prognosis:  fair  15. Follow up Appointments on Discharge: pcp, neurology         discussed with Dr. Barber , PM&R staff physician     Mimi  Yaakov ALANIZ  Rehab Service  Pager [phone]

## 2020-06-05 NOTE — PROGRESS NOTES
Social Work Services Progress Note    Hospital Day: 8  Date of Initial Social Work Evaluation: 6/3/20  Collaborated with: Hilaria, Lien, ARU    Data:  SW following to support discharge and transition to ARU.     Intervention:  [facility].  Lien required stretcher ride due to COVID-19 hx, despite informing them that Hilaria tested negative for COVID-19 on 5/29.      Hilaria informed of discharge and onboard with transition plan.  Hilaria's mother dropped of some additional clothing items to hospital that she needs for her upcoming time in ARU.    SW provided Hilaria with resources to apply for WIC and possibly Unemployment benefits in the coming month, depending on how her current employment status is impacted by her graeme.  See Social Work Assessment completed 6/3 for details.     Plan:    Anticipated Disposition:  Facility:  [facility]    Barriers to d/c plan:  None identified.    Follow Up:  SW available to support, as needed.     JESSICA Loco, MSW  Social Work Services, Emergency Dept [facility]  Pager: [phone] Mon-Sat 9 am - 9 pm, on-call/after hours pager [phone]

## 2020-06-05 NOTE — PLAN OF CARE
Occupational Therapy Discharge Summary    Reason for therapy discharge:    Discharged to acute rehabilitation facility.    Progress towards therapy goal(s). See goals on Care Plan in [facility] electronic health record for goal details.  Goals partially met.  Barriers to achieving goals:   discharge from facility.    Therapy recommendation(s):    Continued therapy is recommended.  Rationale/Recommendations:  To progress independence with ADLs and functional mobility.

## 2020-06-05 NOTE — PLAN OF CARE
Discharge Planner PT   Patient plan for discharge: ARU  Current status: Amb x350ft with FWW and supervision, w/c follow, focusing on heel-toe pattern.  Completed hip strength exercises in standing  Barriers to return to prior living situation: below baseline functional status  Recommendations for discharge: ARU  Rationale for recommendations: To max IND functional mobility, anticipate will tolerate 3hr/day therapy       Entered by: Radha Schultz 06/05/2020 11:53 AM

## 2020-06-05 NOTE — PLAN OF CARE
[id number]  Pt reporting numbness around mouth, states this has been going on since everything started-on list to update MD. Ox4. PIV SL. Regular diet, adequate intake. BS+.  RUE tremors, pt states the burning/numbness is from her wrist to fingers, up with 1/GB/walker. 4/5 throughout.  Continue to monitor and follow POC

## 2020-06-05 NOTE — PLAN OF CARE
Physical Therapy Discharge Summary    Reason for therapy discharge:    Discharged to acute rehabilitation facility.    Progress towards therapy goal(s). See goals on Care Plan in [facility] electronic health record for goal details.  Goals not met.  Barriers to achieving goals:   discharge from facility.    Therapy recommendation(s):    Continued therapy is recommended.  Rationale/Recommendations:  continue LE sensory integration and compensation training, balance, gait.

## 2020-06-05 NOTE — PROGRESS NOTES
Status: Admitted with neuropathic pain. RUE tremor. Slowed speech x 2 days.   Vitals: VSS  Neuros: A&Ox4.  N/T to all extremities, burning sensation to Marcelo UE.  Right UE tremor.  Numbness around mouth, MD notified.  Per patient, numbness around mouth has been there since admission.     IV: PIV SL  Resp/trach: WDL  Diet: Regular  Bowel status: BS+.  LBM 6/3  : Voiding spont, ambulates to bathroom.  Skin: intact  Pain: Pain to hands and feet.  Getting PRN Tylenol, atarax, tramadol.  Gabapentin cream to feet.  Voltaren get to hands.    Activity: Up with A1 GB.    Plan: ARU.  Continue with current POC.

## 2020-06-05 NOTE — PLAN OF CARE
Status: Admitted with neuropathic pain. RUE tremor. Slowed speech x 2 days.   Vitals: VSS  Neuros: A&Ox4.  N/T to all extremities, burning sensation to Marcelo UE and LE.   IV: PIV SL  Resp/trach: WDL  Diet: Regular  Bowel status: BS+.  LBM 6/5  : Voiding spont, ambulates to bathroom.  Skin: intact  Pain: Pain to hands and feet.  Getting PRN Tylenol, atarax, tramadol.  Gabapentin cream to feet.   Activity: Up with A1, GB an walker.     Plan: Going to rehab. Via ambulance with stretcher. All belongings packed. Pt denies questions or concerns. Reported to rehab at 230pm. Waiting on transport.

## 2020-06-05 NOTE — PROGRESS NOTES
CLINICAL NUTRITION SERVICES      Reviewed nutrition risk factors due to LOS. Pt is tolerating diet, eating well per nursing documentation. No nutrition issues identified at this time. RD will follow via rounds at this time, unless consulted.       Quynh Basilio RD, LD, [facility]  Neuro ICU  Pager: [phone]

## 2020-06-06 ENCOUNTER — APPOINTMENT (OUTPATIENT)
Dept: PHYSICAL THERAPY | Facility: CLINIC | Age: 30
End: 2020-06-06
Payer: COMMERCIAL

## 2020-06-06 ENCOUNTER — APPOINTMENT (OUTPATIENT)
Dept: OCCUPATIONAL THERAPY | Facility: CLINIC | Age: 30
End: 2020-06-06
Payer: COMMERCIAL

## 2020-06-06 PROCEDURE — 97530 THERAPEUTIC ACTIVITIES: CPT | Mod: GO

## 2020-06-06 PROCEDURE — 25000132 ZZH RX MED GY IP 250 OP 250 PS 637: Performed by: PHYSICIAN ASSISTANT

## 2020-06-06 PROCEDURE — 97116 GAIT TRAINING THERAPY: CPT | Mod: GP | Performed by: PHYSICAL THERAPIST

## 2020-06-06 PROCEDURE — 97167 OT EVAL HIGH COMPLEX 60 MIN: CPT | Mod: GO

## 2020-06-06 PROCEDURE — 12800006 ZZH R&B REHAB

## 2020-06-06 PROCEDURE — 97530 THERAPEUTIC ACTIVITIES: CPT | Mod: GP | Performed by: PHYSICAL THERAPIST

## 2020-06-06 PROCEDURE — 97162 PT EVAL MOD COMPLEX 30 MIN: CPT | Mod: GP | Performed by: PHYSICAL THERAPIST

## 2020-06-06 PROCEDURE — 97535 SELF CARE MNGMENT TRAINING: CPT | Mod: GO

## 2020-06-06 RX ORDER — CYANOCOBALAMIN 1000 UG/ML
1000 INJECTION, SOLUTION INTRAMUSCULAR; SUBCUTANEOUS
Status: DISCONTINUED | OUTPATIENT
Start: 2020-06-16 | End: 2020-06-11 | Stop reason: HOSPADM

## 2020-06-06 RX ADMIN — HYDROXYZINE HYDROCHLORIDE 50 MG: 25 TABLET ORAL at 08:06

## 2020-06-06 RX ADMIN — THERA TABS 1 TABLET: TAB at 07:49

## 2020-06-06 RX ADMIN — RIVAROXABAN 20 MG: 10 TABLET, FILM COATED ORAL at 07:49

## 2020-06-06 RX ADMIN — POTASSIUM CHLORIDE 10 MEQ: 750 TABLET, EXTENDED RELEASE ORAL at 20:26

## 2020-06-06 RX ADMIN — PREGABALIN 150 MG: 75 CAPSULE ORAL at 13:59

## 2020-06-06 RX ADMIN — TIZANIDINE 4 MG: 2 TABLET ORAL at 21:27

## 2020-06-06 RX ADMIN — Medication 25 MG: at 21:27

## 2020-06-06 RX ADMIN — ACETAMINOPHEN 650 MG: 325 TABLET, FILM COATED ORAL at 16:25

## 2020-06-06 RX ADMIN — THIAMINE HCL TAB 100 MG 100 MG: 100 TAB at 07:49

## 2020-06-06 RX ADMIN — Medication 1 TABLET: at 08:00

## 2020-06-06 RX ADMIN — TRAMADOL HYDROCHLORIDE 50 MG: 50 TABLET, FILM COATED ORAL at 20:26

## 2020-06-06 RX ADMIN — TRAMADOL HYDROCHLORIDE 50 MG: 50 TABLET, FILM COATED ORAL at 02:11

## 2020-06-06 RX ADMIN — HYDROXYZINE HYDROCHLORIDE 50 MG: 25 TABLET ORAL at 16:25

## 2020-06-06 RX ADMIN — OXYCODONE HYDROCHLORIDE AND ACETAMINOPHEN 1000 MG: 500 TABLET ORAL at 07:49

## 2020-06-06 RX ADMIN — Medication 1 TABLET: at 20:26

## 2020-06-06 RX ADMIN — PREGABALIN 150 MG: 75 CAPSULE ORAL at 20:26

## 2020-06-06 RX ADMIN — ACETAMINOPHEN 650 MG: 325 TABLET, FILM COATED ORAL at 08:06

## 2020-06-06 RX ADMIN — MELATONIN 1000 UNITS: at 07:49

## 2020-06-06 RX ADMIN — TRAMADOL HYDROCHLORIDE 50 MG: 50 TABLET, FILM COATED ORAL at 13:59

## 2020-06-06 RX ADMIN — AMITRIPTYLINE HYDROCHLORIDE 25 MG: 25 TABLET, FILM COATED ORAL at 22:12

## 2020-06-06 RX ADMIN — PREGABALIN 150 MG: 75 CAPSULE ORAL at 07:54

## 2020-06-06 RX ADMIN — FOLIC ACID 1 MG: 1 TABLET ORAL at 07:49

## 2020-06-06 RX ADMIN — TRAMADOL HYDROCHLORIDE 50 MG: 50 TABLET, FILM COATED ORAL at 07:54

## 2020-06-06 RX ADMIN — POTASSIUM CHLORIDE 10 MEQ: 750 TABLET, EXTENDED RELEASE ORAL at 08:00

## 2020-06-06 RX ADMIN — Medication 1 MG: at 22:12

## 2020-06-06 ASSESSMENT — MIFFLIN-ST. JEOR: SCORE: 1888.9

## 2020-06-06 ASSESSMENT — ACTIVITIES OF DAILY LIVING (ADL)
PREVIOUS_RESPONSIBILITIES: MEAL PREP;HOUSEKEEPING;LAUNDRY;SHOPPING;MEDICATION MANAGEMENT;FINANCES;DRIVING;WORK;CHILD CARE

## 2020-06-06 NOTE — PLAN OF CARE
FOCUS/GOAL  Bowel management, Bladder management, and Pain management    ASSESSMENT, INTERVENTIONS AND CONTINUING PLAN FOR GOAL:  Pt slept well with PCD on overnight.   c/o pain at hands, legs, and feet, called for pain medication, PRN Ativan offered with effective. Gabapentin cream is not available until Monday per Pharmacy.  Ax 1 with walker and GB to transfer.  Cont of bladder in the toilet, PVRs done and less than 100 cc.   LBM on 6/5.

## 2020-06-06 NOTE — PROGRESS NOTES
06/06/20 1120   Quick Adds   Type of Visit Initial Occupational Therapy Evaluation   Living Environment   Lives With child(lavell), dependent  (2 kids; 3 and 10 years old )   Living Arrangements apartment   Home Accessibility stairs to enter home;stairs within home   Number of Stairs, Main Entrance 2   Stair Railings, Main Entrance none   Number of Stairs, Within Home, Primary 8   Stair Railings, Within Home, Primary railings safe and in good condition   Transportation Anticipated family or friend will provide   Living Environment Comment OT: Pt lives in an apartment in [facility] with her two children who are 3 and 10 years old. There are 2 CAYETANO and 8 stairs to get into apartment which is on lower level. Pt reports that there is a tub shower with no grab bars in shower or near toilet. Pt reports her room is right next to personal bathroom and she sleeps in a queen bed sometimes with her children. Pt reports that she was not using any devices within the home but has a FWW.    Self-Care   Usual Activity Tolerance fair   Current Activity Tolerance fair   Regular Exercise No   Equipment Currently Used at Home walker, rolling   Activity/Exercise/Self-Care Comment OT: Pt reports she likes to watch TV, spend time with her kids, and was working as a CNA. Pt reports she enjoys taking her kids to the park, going shopping, going bowling.    Functional Level   Ambulation 0-->independent   Transferring 0-->independent   Toileting 0-->independent   Bathing 0-->independent   Dressing 0-->independent   Eating 0-->independent   Communication 0-->understands/communicates without difficulty   Swallowing 0-->swallows foods/liquids without difficulty   Cognition 0 - no cognition issues reported   Fall history within last six months yes   Number of times patient has fallen within last six months 1  (before admission to previous hospital )   Which of the above functional risks had a recent onset or change?  "ambulation;transferring;toileting;bathing;dressing   Prior Functional Level Comment OT: pt was IND with ADLs prior to admission    General Information   Onset of Illness/Injury or Date of Surgery - Date 04/01/20   Referring Physician GUY Saavedra   Patient/Family Goals Statement OT: \" get stronger to walk and strengthen BLE's\"    Additional Occupational Profile Info/Pertinent History of Current Problem Per pt chart, \" 28 y/o F with complex PMH including +COVID in April who developed worsening neuropathic pain and weakness over the past several weeks.  Extensive workup ensued, ultimately it is suspected the patient's symptoms are related to nutritional deficiencies in the setting of her prior gastric sleeve surgery, not taking nutritional supplements, and recent COVID.  Currently still with pain in all extremities, was recently switched to Lyrica from Gabapentin and Elavil was started last night.  Can increase dose if needed.  Gabapentin gel is helpful for foot pain, unfortunately we are unable to obtain this until Monday when the  compounding pharmacy is open.  On exam pt is tachycardic, ~120s.  Upon review of notes her HR has generally been elevated during hospitalization, and has been into the 130s in some office visits prior to admission.  She is in no acute distress and non-toxic appearing.  She has chronic chest pain, but denies any pain at this time.  Will have her follow up with her cardiologist after discharge. \"   Precautions/Limitations fall precautions   Weight-Bearing Status - LUE full weight-bearing   Weight-Bearing Status - RUE full weight-bearing   Weight-Bearing Status - LLE full weight-bearing   Weight-Bearing Status - RLE full weight-bearing   Heart Disease Risk Factors Smoking;Diabetes;High blood pressure;Lack of physical activity;Overweight;Stress;Family history;Race   Cognitive Status Examination   Orientation orientation to person, place and time   Level of Consciousness alert " "  Follows Commands (Cognition) WFL   Memory intact   Attention No deficits were identified   Organization/Problem Solving No deficits were identified   Executive Function Planning ability impaired  (pt reports she needs to think before planning her words )   Visual Perception   Visual Perception Comments OT: pt reports she is supposed to wear glasses but does not at this time. Visual tracking and peripheral vision are intact. Able to perform hand to nose test.    Sensory Examination   Sensory Comments OT: pt reports BLE's, bilateral feet and hands constantly has sensations of numbness and tingling. Light touch intact with able to localize touch for BUE's while seated.    Pain Assessment   Patient Currently in Pain Yes, see Vital Sign flowsheet  (hands, legs and feet )   Integumentary/Edema   Integumentary/Edema Comments OT: noting 2+ pitting edema in BLE's    Range of Motion (ROM)   ROM Comment OT: Pt is WFL for sh flexion, abd, IR, ER, elbow flex/ext, sup/pro, wrist flex/ext, and opposiiton. Noting decreased composite flexion with R hand. Pt is R hand dominant- noting no previous shoulder or UE injuries.    Strength   Strength Comments OT: 5/5 for R/L sh flexion,abd, ER. (IR 4/5 bilaterally). R/L elbow flexion/extension 5/5, wrist flexion/ext 4+/5 bilaterally. Pt did start to cry during MMT suddenly and writer asking pt to provide context on feelings and pt reporting 10/10 pain with grasp of bilateral hands. Pt reporting \"I always have 10/10 pain, but I didn't say anything because I always fight through it\". Writer allowing pt to express her feelings and use therapeutic listening to assist. Writer assuring pt when she is in pain to let staff know to prevent any further communications.    Hand Strength   Left hand  (pounds) 26 pounds   Right hand  (pounds) 30 pounds   Hand Strength Comments OT: decreased overall bilateral grasp. pt reporting 10/10 pain with using dynamometer but noting no objective signs of " difficulty.    Coordination   Left hand, nine hole peg test (seconds) 100 seconds  (uses pronation to place pegs/ compensate for decreased FMC )   Right hand, nine hole peg test (seconds) 150 seconds    Coordination Comments OT: noted significant decreased FMC. Pt uses excessive pronation with fine tip pinch to compensate for lack of FMC and pinch strength.    ARC Assessment Only   Acute Rehab Functional Assessment See IP Rehab Daily Documentation Flowsheet for Functional Mobility/ADL Assessment   Instrumental Activities of Daily Living (IADL)   Previous Responsibilities meal prep;housekeeping;laundry;shopping;medication management;finances;driving;work;   IADL Comments OT: pt reports she did all IADLs ind'tly before admission. pt reports that her apartment complex completed yardwork for her.    Activities of Daily Living Analysis   Impairments Contributing to Impaired Activities of Daily Living balance impaired;coordination impaired;fear and anxiety;motor control impaired;pain;postural control impaired;sensory feedback impaired;strength decreased   General Therapy Interventions   Planned Therapy Interventions ADL retraining;IADL retraining;balance training;fine motor coordination training;groups;motor coordination training;neuromuscular re-education;ROM;strengthening;stretching;transfer training;visual perception;home program guidelines;progressive activity/exercise;risk factor education   Clinical Impression   Criteria for Skilled Therapeutic Interventions Met yes, treatment indicated   OT Diagnosis OT: decreased safety and IND with ADLs and IADLS.    Influenced by the following impairments OT: decreased balance, decreased strength, decreased sensory feedback, decreased motor control, decreased postural control, pain, fear and anxiety    Assessment of Occupational Performance 5 or more Performance Deficits   Identified Performance Deficits OT: ambulation, transferring, dressing, bathing, toileting, g/h  and oral cares.    Clinical Decision Making (Complexity) High complexity   Therapy Frequency Daily   Predicted Duration of Therapy Intervention (days/wks) 7-10 days    Anticipated Equipment Needs at Discharge bathing equipment;dressing equipment;reacher;tub bench   Anticipated Discharge Disposition Home with Home Therapy;Home with Outpatient Therapy   Risks and Benefits of Treatment have been explained. Yes   Patient, Family & other staff in agreement with plan of care Yes   Clinical Impression Comments OT: pt will benefit from skilled OT services to address and increase IND with ADLs and IADLs.    Total Evaluation Time   Total Evaluation Time (Minutes) 30  (30 High Complexity OT eval)

## 2020-06-06 NOTE — PROGRESS NOTES
06/06/20 0926   Quick Adds   Type of Visit Initial PT Evaluation   Living Environment   Lives With child(lavell), dependent   Living Arrangements apartment   Home Accessibility stairs to enter home;stairs within home;other (see comments)   Number of Stairs, Main Entrance 2   Stair Railings, Main Entrance none   Number of Stairs, Within Home, Primary 8   Stair Railings, Within Home, Primary railings safe and in good condition   Living Environment Comment Pt lives in an apartment with 2 children (3 yo and 10 yo sons).  Pt's parents live in the area and are available to help with her children. Pt has 2 steps to enter building and 8 steps with 2 HR to apartment which is on lower level   Self-Care   Usual Activity Tolerance moderate   Current Activity Tolerance fair   Regular Exercise No   Equipment Currently Used at Home walker, rolling   Activity/Exercise/Self-Care Comment Prior to hospitalization pt was IND with all mobility. Pt works as a CNA   Functional Level Prior   Ambulation 0-->independent   Transferring 0-->independent   Toileting 0-->independent   Bathing 0-->independent   Communication 0-->understands/communicates without difficulty   Swallowing 0-->swallows foods/liquids without difficulty   Cognition 0 - no cognition issues reported   Fall history within last six months yes   Number of times patient has fallen within last six months 1   Which of the above functional risks had a recent onset or change? ambulation;transferring   Prior Functional Level Comment Pt has FWW from prior hosptialization, however did not use regularly   General Information   Onset of Illness/Injury or Date of Surgery - Date 05/28/20   Referring Physician GUY Saavedra   Patient/Family Goals Statement return home, improve ease of movement   Pertinent History of Current Problem (include personal factors and/or comorbidities that impact the POC) Per H&P: 29 year old woman with a past medical history of PE with associated PEA  cardiac arrest, morbid obesity s/p sleeve gastrectomy (3/2019), pancreatitis, and COVID 4/2020 who presented with four week history of paresthesias. Reportedly progressive in nature with reported slow speech and right arm tremor. Underwent extensive workup per neurology including: MRI brain, cervical spine, LP which were unremarkable. Rheumatologic workup overall unremarkable.  ESR and CRP elevated ANH weakly positive. While differential includes post infectious autoimmune neuropathy, rheumatologic etiology, felt most likely vitamin deficiency in setting of known bariatric surgery, recent poor po intake and non compliance with folate supplementation with folate level 1.4. Both folate and additional supplementation was started, pain medications were titrated.   Weight-Bearing Status - LUE full weight-bearing   Weight-Bearing Status - RUE full weight-bearing   Weight-Bearing Status - LLE full weight-bearing   Weight-Bearing Status - RLE full weight-bearing   Heart Disease Risk Factors Smoking;Diabetes;High blood pressure;Lack of physical activity;Overweight;Stress;Family history;Race   Cognitive Status Examination   Orientation orientation to person, place and time   Level of Consciousness alert   Follows Commands and Answers Questions 100% of the time;able to follow multistep instructions   Personal Safety and Judgment intact   Memory intact   Pain Assessment   Patient Currently in Pain Yes, see Vital Sign flowsheet  (achiness in B hands and legs/feet)   Integumentary/Edema   Integumentary/Edema Comments mild edema in B LEs   Posture    Posture Forward head position;Protracted shoulders   Range of Motion (ROM)   ROM Quick Adds No deficits were identified   Strength   Strength Comments 4/5 throughout B LEs   ARC Assessment Only   Acute Rehab Functional Assessment See IP Rehab Daily Documentation Flowsheet for Functional Mobility/ADL Assessment   Balance   Balance Comments static sitting: mod I, Static standing without  AD, Donnell.    Sensory Examination   Sensory Perception Comments 25% impairment to light touch in B LEs.  25% impairment to proprioception in B great toes   Coordination   Coordination Comments rapid alternating toe taps - slow, difficulty with heel-shin B   Muscle Tone   Muscle Tone no deficits were identified   General Therapy Interventions   Planned Therapy Interventions balance training;gait training;motor coordination training;neuromuscular re-education;strengthening;transfer training;progressive activity/exercise;home program guidelines   Clinical Impression   Criteria for Skilled Therapeutic Intervention yes, treatment indicated   PT Diagnosis impaired balance and coordination, decreased functional mobility, decreased strength   Influenced by the following impairments impaired balance, decreased sensation, decreased proprioception, decreased strength, decreased activity tolerance   Functional limitations due to impairments difficulty with transfers, gait leading to decreased access to community mobility   Clinical Presentation Evolving/Changing   Clinical Presentation Rationale complex PMH, current clinical presentation   Clinical Decision Making (Complexity) Moderate complexity   Therapy Frequency Daily   Predicted Duration of Therapy Intervention (days/wks) 10 days   Anticipated Discharge Disposition Home with Home Therapy;Home with Outpatient Therapy   Risk & Benefits of therapy have been explained Yes   Patient, Family & other staff in agreement with plan of care Yes   Clinical Impression Comments Pt would benefit from skilled PT to address deficits and optimize function   Total Evaluation Time   Total Evaluation Time (Minutes) 30

## 2020-06-06 NOTE — PHARMACY-MEDICATION REGIMEN REVIEW
Pharmacy Medication Regimen Review  Hilaria Bonner is a 29 year old female who is currently in the Acute Rehab Unit.    Assessment: All medications have an appropriate indications, durations and no unnecessary use was found    Plan:   Continue medication therapy as planned.     Attending provider will be sent this note for review.  If there are any emergent issues noted above, pharmacist will contact provider directly by phone.      Pharmacy will periodically review the resident's medication regimen for any PRN medications not administered in > 72 hours and discontinue them. The pharmacist will discuss gradual dose reductions of psychopharmacologic medications with interdisciplinary team on a regular basis.    Please contact pharmacy if the above does not answer specific medication questions/concerns.    Background:  A pharmacist has reviewed all medications and pertinent medical history today.  Medications were reviewed for appropriate use and any irregularities found are listed with recommendations.      Current Facility-Administered Medications:      acetaminophen (TYLENOL) tablet 650 mg, 650 mg, Oral, Q6H PRN, Mimi Nuno PA, 650 mg at 06/06/20 0806     amitriptyline (ELAVIL) tablet 25 mg, 25 mg, Oral, At Bedtime, Mimi Nuno PA, 25 mg at 06/05/20 2156     calcium citrate-vitamin D (CITRACAL) 315-250 MG-UNIT per tablet 1 tablet, 1 tablet, Oral, BID, Mimi Nuno PA, 1 tablet at 06/06/20 0800     [START ON 6/16/2020] cyanocobalamin injection 1,000 mcg, 1,000 mcg, Intramuscular, Q30 Days, Ingrid Bingham MD     folic acid (FOLVITE) tablet 1 mg, 1 mg, Oral, Daily, Mimi Nuno PA, 1 mg at 06/06/20 0749     gabapentin 8 % in PLO cream 1 g, 1 g, Topical, Q8H, Mimi Nuno PA     hydrOXYzine (ATARAX) tablet 25-50 mg, 25-50 mg, Oral, Q6H PRN, Mimi Nuno PA, 50 mg at 06/06/20 0806     melatonin tablet 1 mg, 1 mg, Oral, At Bedtime, Mimi Nuno PA, 1 mg at 06/05/20  2156     multivitamin, therapeutic (THERA-VIT) tablet 1 tablet, 1 tablet, Oral, Daily, Mimi Nuno PA, 1 tablet at 06/06/20 0749     naloxone (NARCAN) injection 0.1-0.4 mg, 0.1-0.4 mg, Intravenous, Q2 Min PRN, Ingrid Bingham MD     Patient is already receiving anticoagulation with heparin, enoxaparin (LOVENOX), warfarin (COUMADIN)  or other anticoagulant medication, , Does not apply, Continuous PRN, Mimi Nuno PA     phenylephrine-shark liver oil-mineral oil-petrolatum (PREPARATION H) 0.25-14-74.9 % rectal ointment, , Rectal, BID PRN, iMmi Nuno PA     polyethylene glycol (MIRALAX) Packet 17 g, 17 g, Oral, Daily PRN, Mimi Nuno PA     potassium chloride ER (KLOR-CON M) CR tablet 10 mEq, 10 mEq, Oral, BID, Mimi Nuno PA, 10 mEq at 06/06/20 0800     pregabalin (LYRICA) capsule 150 mg, 150 mg, Oral, TID, Mimi Nuno PA, 150 mg at 06/06/20 1359     rivaroxaban ANTICOAGULANT (XARELTO) tablet 20 mg, 20 mg, Oral, Daily with breakfast, iMmi Nuno PA, 20 mg at 06/06/20 0749     tiZANidine (ZANAFLEX) tablet 4 mg, 4 mg, Oral, At Bedtime PRN, Mimi Nuno PA, 4 mg at 06/05/20 2156     traMADol (ULTRAM) tablet 50 mg, 50 mg, Oral, Q6H PRN, Mimi Nuno PA, 50 mg at 06/06/20 1359     traZODone (DESYREL) half-tab 25-50 mg, 25-50 mg, Oral, At Bedtime PRN, Mimi Nuno PA, 25 mg at 06/05/20 2208     vitamin B1 (THIAMINE) tablet 100 mg, 100 mg, Oral, Daily, Mimi Nuno PA, 100 mg at 06/06/20 0749     vitamin C (ASCORBIC ACID) tablet 1,000 mg, 1,000 mg, Oral, Daily, Mimi Nuno PA, 1,000 mg at 06/06/20 0749     Vitamin D3 (CHOLECALCIFEROL) 25 mcg (1000 units) tablet 1,000 Units, 1,000 Units, Oral, Daily, Mimi Nuno PA, 1,000 Units at 06/06/20 0749  No current outpatient prescriptions on file.   PMH: Mil Kenyon, PharmD  6/6/2020

## 2020-06-06 NOTE — H&P
Post Admission Physician Evaluation:    I have compared Hilaria Bonner's condition on admission to acute rehabilitation to that outlined in the preadmission screen. History and physical exam performed by GUY Mayes, and myself. No significant differences are identified and the patient remains appropriate for an inpatient rehabilitation facility level of care to manage medical issues and address functional impairments due to polyneuropathy.    Comorbid medical conditions being managed:   Hx of PE and PEA, anticoagulation, neuropathic pain, Hx of COVID, nutritional deficiencies, tachycardia, chronic chest pain    Prior functional level:   Pt was independent with all ADLs/IADLs, transfers, mobility and gait.      Present function:   PT:  Amb x350ft with FWW and supervision, w/c follow, focusing on heel-toe pattern.  Completed hip strength exercises in standing    OT:  Close SBA for toilet transfer from raised seat height to allow for increased independence.  Pt demos ability to open/close twist caps with min A, increased pain/difficulty when textures present or increased pressure required.  Encouraged pt to use dycem, built up foam handle, and/or larger joints in UE when able.       Anticipated rehabilitation course:   Anticipate discharge home at a modified independent level for ambulation, mobility, ADLs, and ability to navigate 8 stairs.     Will benefit from intensive rehabilitation includin minutes each of PT and OT  Rehabilitation nursing  Close management by physiatry    Prognosis: Good    Estimated length of stay: 7 days    Lemuel Barber MD  Department of Rehabilitation Medicine  Pager: [phone]

## 2020-06-06 NOTE — PROGRESS NOTES
"OT: -15 d/t adding time to session before pt OK'd by .       OT eval completed, treatment initiated. Pt completed toilet transfer and toilet hygiene with CGA-min A using FWW. Pt is CGA using FWW to perform chair to bed transfer. Did provide built up hand  on either side of FWW due to decreased sensory feedback and grasp in bilateral hands. Pt consistently reporting high pain levels in bilateral hands due to her sensation of numbness in tingling. Pt rated 10/10 pain in hands during morning session and afternoon session but continued on with 9HP and MMT. Pt reporting  \"I always have 10/10 pain, but I didn't say anything because I always fight through it\". Anticipating ELOS 7-10 days. Recommending discharge to be home with home care OT vs home with OP OT pending progress. Anticipating pt will require shower equipment, dressing equipment, and grab bars.   "

## 2020-06-06 NOTE — PLAN OF CARE
Discharge Planner PT   Patient plan for discharge: home  Current status: Pt independent with bed mobility, CGA for transfers with FWW, ambulation up to 180' with FWW and CGA - slow speed and up/down 8 steps with CGA and railings.  Barriers to return to prior living situation: Pt limited by decreased activity tolerance, impaired sensation and proprioception in B LE, decreased coordination and strength, and pain  Recommendations for discharge: home with home PT vs OP PT  Rationale for recommendations:  current presentation and below baseline mobility       Entered by: Ever Paredes 06/06/2020 5:07 PM

## 2020-06-06 NOTE — PROGRESS NOTES
20 1403   Living Arrangements   Lives With child(lavell), dependent   Living Arrangements apartment   Values Beliefs and Spiritual Care   F: Janeth: Do you have a connection with a janeth community, Rastafari, or Religious group? yes   The name of the janeth community, Rastafari, or Religious group is:   (Baptism)   Final Resources   Equipment Currently Used at Home walker, rolling     Social Work: Initial Assessment with Discharge Plan    Patient Name: Hilaria Bonner  : 1990  Age: 29 year old  MRN: [id number]  Completed assessment with: patient  Admitted to ARU: 20    Presenting Information   Date of SW assessment: 20  Health Care Directive: none  Primary Health Care Agent: next-of-kin (parents) would be surrogate decision-maker if necessary  Secondary Health Care Agent: n/a  Living Situation: lives with her two children (ages 3 & 10) in an apartment with 8 steps to access  Previous Functional Status: independent, was working prior to April  DME available: wheeled walker  Patient and family understanding of hospitalization: vitamin deficiency  Cultural/Language/Spiritual Considerations: speaks English, Baptism ( visits)    Physical Health  Reason for admission:   1. Vitamin B12 deficiency (non anemic)    2.   Covid-19 (hospitalized in 2020)    Provider Information   Primary Care Physician: Crissy Madrigal    Mental Health/Chemical Dependency:   Diagnosis: anxiety (atarax), insomnia (trazodone, elavil)  Alcohol/Tobacco/Narcotics: smoked about 5 cigarettes per day, couple drinks per week  Support/Services in Place: n/a, but saw health psychologist for weight loss counseling in the last few years  Services Needed/Recommended: health psychologist available if interested during stay  Sexuality/Intimacy: no concerns    Support System  Marital Status: single  Family support: mother-Jazmin (works part-time on 3rd shift), father-Less (works 1st shift)-both of her parents  help care for children  Other support available: her children's father I(Pasquale) lives locally & helps with children  Felicia (son-age 3), Etta Moy (son-age 10)    Community Resources  Current in home services: none  Previous services: none    Financial/Employment/Education  Employment Status: worked as CNA full-time and part-time most recently at [employer] in [place] until April hospitalization  Income Source: worker's compensation-$475/week (thinks she will be paid 1 more week)--considering returning to work after discharge home  Education: associate degree for dental hygienist, CNA certificate  Financial Concerns: worried about rent for 7/1 (she was given information about applying for unemployment benefits but may be returning to work [employer] stuart support info given)  Insurance: [facility]    Discharge Plan   Patient and family discharge goal: return home with her children & recommended therapy services  Provided education on discharge plan: home care/therapy and outpatient therapy  Patient agreeable to discharge plan: to be determined  Provided education and attained signature for Medicare IM and IRF Patient Rights and Privacy Information provided to patient : n/a  Provided patient with [place] [facility] [place] Resources: n/a  Barriers to discharge: none identified    Discharge Recommendations   Disposition: return home with her children & recommended therapy services  Transportation Needs: family can provide  Name of Transportation Company and Phone: [facility] transport benefit?    Additional comments   D:  See H&P for full medical background.  I:  Met with patient to complete assessment and social work consult.  A:  Patient is doing well, motivated to get stronger and be independent again.  Supportive family who is involved.    P:  SW will follow and assist as needed.    Please invite to Care Conference:  Jazmin (mother)      IVET [place]  Weekend   Phone: [phone]  Pager:  [phone]

## 2020-06-06 NOTE — PLAN OF CARE
"FOCUS/GOAL  Bowel management, Bladder management, Pain management, Mobility, Skin integrity, and Safety management    ASSESSMENT, INTERVENTIONS AND CONTINUING PLAN FOR GOAL:    A&O x 4. Slow speech. Denies SOB, dizziness, N/V. Pain and numbness and tingling in upper and lower extremeties (baseline). Pain meds given with no effect- sticky note written to physician. Later in evening pt told writer she is starting to feel intermittent \"lightening\" pain in arms. On- call physician notified and he said to give all PRN meds and he will talk to her in morning and call him again if it's getting worse or pt can't sleep- PRN meds given. Pt sleeping and did not complain of worsening pain. No skin issues noted besides some old scabbing and scars on extremities (not concerning). Pt reports in and out tremors in R side extremities- not new. LBM this evening. A of 1 with walker and gait belt to toilet. Voided spontaneously through out shift. Continent of bowel and bladder. Ate 75% of meal with adaptive utensil equipment. Pt asked for PCDs- writer put in order, called SPD and set it up for her. Pt informed writer that she does not want anyone waking her up once she is asleep. Writer informed her that night shift may come in to do vitals Q 4 hours since she is admit. Pt states she does not want this and states that if she calls them for bathroom help in middle of night, then they can do vitals. Writer stated she will pass on request to night nurse- night nurse informed.Pt lying in bed in lowest position, bed locked, PCDs and alarms on. Continue POC.    "

## 2020-06-07 ENCOUNTER — APPOINTMENT (OUTPATIENT)
Dept: PHYSICAL THERAPY | Facility: CLINIC | Age: 30
End: 2020-06-07
Payer: COMMERCIAL

## 2020-06-07 ENCOUNTER — APPOINTMENT (OUTPATIENT)
Dept: OCCUPATIONAL THERAPY | Facility: CLINIC | Age: 30
End: 2020-06-07
Payer: COMMERCIAL

## 2020-06-07 LAB
BACTERIA SPEC CULT: NO GROWTH
Lab: NORMAL
SPECIMEN SOURCE: NORMAL

## 2020-06-07 PROCEDURE — 97530 THERAPEUTIC ACTIVITIES: CPT | Mod: GP | Performed by: PHYSICAL THERAPIST

## 2020-06-07 PROCEDURE — 25000132 ZZH RX MED GY IP 250 OP 250 PS 637: Performed by: PHYSICIAN ASSISTANT

## 2020-06-07 PROCEDURE — 97535 SELF CARE MNGMENT TRAINING: CPT | Mod: GO

## 2020-06-07 PROCEDURE — 97116 GAIT TRAINING THERAPY: CPT | Mod: GP | Performed by: PHYSICAL THERAPIST

## 2020-06-07 PROCEDURE — 97112 NEUROMUSCULAR REEDUCATION: CPT | Mod: GP | Performed by: PHYSICAL THERAPIST

## 2020-06-07 PROCEDURE — 97110 THERAPEUTIC EXERCISES: CPT | Mod: GP | Performed by: PHYSICAL THERAPIST

## 2020-06-07 PROCEDURE — 12800006 ZZH R&B REHAB

## 2020-06-07 PROCEDURE — 25000132 ZZH RX MED GY IP 250 OP 250 PS 637: Performed by: PHYSICAL MEDICINE & REHABILITATION

## 2020-06-07 RX ADMIN — PREGABALIN 150 MG: 75 CAPSULE ORAL at 08:05

## 2020-06-07 RX ADMIN — OXYCODONE HYDROCHLORIDE AND ACETAMINOPHEN 1000 MG: 500 TABLET ORAL at 08:05

## 2020-06-07 RX ADMIN — AMITRIPTYLINE HYDROCHLORIDE 50 MG: 25 TABLET, FILM COATED ORAL at 22:17

## 2020-06-07 RX ADMIN — POTASSIUM CHLORIDE 10 MEQ: 750 TABLET, EXTENDED RELEASE ORAL at 22:17

## 2020-06-07 RX ADMIN — TRAMADOL HYDROCHLORIDE 50 MG: 50 TABLET, FILM COATED ORAL at 12:34

## 2020-06-07 RX ADMIN — TRAMADOL HYDROCHLORIDE 50 MG: 50 TABLET, FILM COATED ORAL at 18:35

## 2020-06-07 RX ADMIN — POLYETHYLENE GLYCOL 3350 17 G: 17 POWDER, FOR SOLUTION ORAL at 06:41

## 2020-06-07 RX ADMIN — FOLIC ACID 1 MG: 1 TABLET ORAL at 08:06

## 2020-06-07 RX ADMIN — ACETAMINOPHEN 650 MG: 325 TABLET, FILM COATED ORAL at 00:51

## 2020-06-07 RX ADMIN — TIZANIDINE 4 MG: 2 TABLET ORAL at 22:17

## 2020-06-07 RX ADMIN — HYDROXYZINE HYDROCHLORIDE 50 MG: 25 TABLET ORAL at 06:41

## 2020-06-07 RX ADMIN — Medication 1 TABLET: at 22:18

## 2020-06-07 RX ADMIN — Medication 1 MG: at 22:17

## 2020-06-07 RX ADMIN — TRAMADOL HYDROCHLORIDE 50 MG: 50 TABLET, FILM COATED ORAL at 06:40

## 2020-06-07 RX ADMIN — MELATONIN 1000 UNITS: at 08:06

## 2020-06-07 RX ADMIN — PREGABALIN 150 MG: 75 CAPSULE ORAL at 14:31

## 2020-06-07 RX ADMIN — HYDROXYZINE HYDROCHLORIDE 50 MG: 25 TABLET ORAL at 12:34

## 2020-06-07 RX ADMIN — ACETAMINOPHEN 650 MG: 325 TABLET, FILM COATED ORAL at 12:34

## 2020-06-07 RX ADMIN — PREGABALIN 150 MG: 75 CAPSULE ORAL at 22:17

## 2020-06-07 RX ADMIN — ACETAMINOPHEN 650 MG: 325 TABLET, FILM COATED ORAL at 18:35

## 2020-06-07 RX ADMIN — ACETAMINOPHEN 650 MG: 325 TABLET, FILM COATED ORAL at 06:40

## 2020-06-07 RX ADMIN — RIVAROXABAN 20 MG: 10 TABLET, FILM COATED ORAL at 08:06

## 2020-06-07 RX ADMIN — HYDROXYZINE HYDROCHLORIDE 50 MG: 25 TABLET ORAL at 00:51

## 2020-06-07 RX ADMIN — HYDROXYZINE HYDROCHLORIDE 50 MG: 25 TABLET ORAL at 18:35

## 2020-06-07 RX ADMIN — POTASSIUM CHLORIDE 10 MEQ: 750 TABLET, EXTENDED RELEASE ORAL at 08:06

## 2020-06-07 RX ADMIN — THIAMINE HCL TAB 100 MG 100 MG: 100 TAB at 08:06

## 2020-06-07 RX ADMIN — Medication 25 MG: at 22:17

## 2020-06-07 RX ADMIN — THERA TABS 1 TABLET: TAB at 08:06

## 2020-06-07 RX ADMIN — Medication 1 TABLET: at 08:06

## 2020-06-07 ASSESSMENT — MIFFLIN-ST. JEOR: SCORE: 1897.97

## 2020-06-07 NOTE — PROGRESS NOTES
[facility]   Acute Rehabilitation Unit  Daily progress note    INTERVAL HISTORY  No acute events overnight but continues to have difficulty with burning in hands and sleep.  We discussed her medications at length today (Trazodone, Tizanidine, Tramadol, and Elavil) including what they are indicated for.  She says Trazodone and Tizanidine were previously scheduled and are now PRN, however review of MAR shows it was also PRN although she is receiving consistently.  Will increase Elavil tonight to 50 mg.  Still with swelling in the LEs, and has not received compression stockings yet.  Functionally she was able to ambulate 180 ft with a FWW and CGA and navigate 8 steps with CGA and rails, but is still quite slow with pace.          MEDICATIONS  Scheduled:    amitriptyline  50 mg Oral At Bedtime     calcium citrate-vitamin D  1 tablet Oral BID     [START ON 6/16/2020] cyanocobalamin  1,000 mcg Intramuscular Q30 Days     folic acid  1 mg Oral Daily     gabapentin  1 g Topical Q8H     melatonin  1 mg Oral At Bedtime     multivitamin, therapeutic  1 tablet Oral Daily     potassium chloride ER  10 mEq Oral BID     pregabalin  150 mg Oral TID     rivaroxaban ANTICOAGULANT  20 mg Oral Daily with breakfast     vitamin B1  100 mg Oral Daily     vitamin C  1,000 mg Oral Daily     Vitamin D3  1,000 Units Oral Daily        PRN:  acetaminophen, hydrOXYzine, naloxone, - MEDICATION INSTRUCTIONS -, phenylephrine-shark liver oil-mineral oil-petrolatum, polyethylene glycol, tiZANidine, traMADol, traZODone       PHYSICAL EXAM  Patient Vitals for the past 24 hrs:   BP Temp Temp src Pulse Resp SpO2 Weight   06/07/20 0756 (!) 148/103 97.5  F (36.4  C) Oral 112 16 97 % --   06/07/20 0625 -- -- -- -- -- -- 113.7 kg (250 lb 11.2 oz)   06/06/20 1709 (!) 133/91 98.3  F (36.8  C) Oral 108 16 99 % --       GEN: NAD, pleasant and cooperative  HEENT: NC/AT  CVS: RRR, S1+S2, no m/r/g  PULM: CTA b/l, no w/r/r  ABD:  Soft, NT, ND, bowel sounds present  EXT: +LE edema or calf tenderness b/l  Neuro: Answers appropriately, follows commands    LABS  CBC RESULTS:   Recent Labs   Lab Test 05/30/20  0450   WBC 8.5   RBC 2.25*   HGB 8.6*   HCT 26.1*   *   MCH 38.2*   MCHC 33.0   RDW 21.5*   *       Last Comprehensive Metabolic Panel:  Sodium   Date Value Ref Range Status   06/01/2020 142 133 - 144 mmol/L Final     Potassium   Date Value Ref Range Status   06/01/2020 3.8 3.4 - 5.3 mmol/L Final     Chloride   Date Value Ref Range Status   06/01/2020 112 (H) 94 - 109 mmol/L Final     Carbon Dioxide   Date Value Ref Range Status   06/01/2020 23 20 - 32 mmol/L Final     Anion Gap   Date Value Ref Range Status   06/01/2020 7 3 - 14 mmol/L Final     Glucose   Date Value Ref Range Status   06/01/2020 82 70 - 99 mg/dL Final     Urea Nitrogen   Date Value Ref Range Status   06/01/2020 6 (L) 7 - 30 mg/dL Final     Creatinine   Date Value Ref Range Status   06/01/2020 0.47 (L) 0.52 - 1.04 mg/dL Final     GFR Estimate   Date Value Ref Range Status   06/01/2020 >90 >60 mL/min/[1.73_m2] Final     Comment:     Non  GFR Calc  Starting 12/18/2018, serum creatinine based estimated GFR (eGFR) will be   calculated using the Chronic Kidney Disease Epidemiology Collaboration   (CKD-EPI) equation.       Calcium   Date Value Ref Range Status   06/01/2020 8.1 (L) 8.5 - 10.1 mg/dL Final       Recent Labs   Lab 06/05/20  0852 06/05/20  0013 06/04/20  0013 06/03/20  0959 06/01/20 2010 06/01/20  1643 06/01/20  0638   GLC  --   --   --   --   --   --  82   BGM 82 114* 92 110* 114* 89  --        IMPRESSION/PLAN:  Hilaria Bonner is a 29 year old woman with past medical history of PE with associated PEA arrest, morbid obesity s/p sleeve gastrectomy, pancreatitis, and recent COVID 19 who presented 5/29/20 with 4 week history of paresthesias, RUE tremor and slowed speech admitted and underwent extensive workup findings of which make vitamin  deficiency highest on current differential. She was admitted to acute rehab on 6/5/20  for ongoing rehabilitation and medical management.         Admission to acute inpatient rehab polyneuropathy suspected 2/2 nutritional deficiency  Impairment group code: 03.3        1. PT, OT 90 minutes of each on a daily basis, in addition to rehab nursing and close management of physiatrist.       2. Impairment of ADL's: Noted to have impaired sensation, activity tolerance, and strength goals for MOD I to I with basic ADLS.      3. Impairment of mobility:  Noted to have impaired sensation, activity tolerance, and strength goals for MOD I to I with basic mobility.         4. 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 Underwent extensive workup per neurology positive findings:  ESR and CRP elevated, ANH weakly positive.Folate remarkably low at 1.4. LP and Brain/Cervical MRI neg.  Highest suspicion at this point is for vitamin deficiency given h/o bariatric surgery, noncompliance with folate supplementation, and recent poor PO intake related to acute illness. Differential includes post-infectious autoimmune neuropathy in the setting of recent COVID-19 infection. Rheumatologic etiology.   -continue thiamine 100 mg daily,  folic acid 1 mg PO daily,  Citracal 1 tablet PO daily, multivitamin, vitamin C 1,000 mg daily & vitamin D3 1,000 units daily  Pain: continue pregabalin to 150 mg TID (increased 6/4)  - Increase Amitriptyline to 50 mg qHS   -  Continue gabapentin topical cream, tramadol 50 mg TID prn, tylenol prn, tizanidine 4 mg at bedtime prn  - PT/OT as tolerated  - Follow up with neurology in 1-2 months as outpatient     #Macrocytic anemia  Hgb. 8.6 5/30. No evidence of active bleeding. MCV elevated to 116 consistent with megaloblastic anemia likely 2/2 known h/o vitamin B12  deficiency (currently wnl at 502 after recent injection on 5/20). Iron studies notable for high iron, low iron binding capacity, and elevated ferritin likely reflect iron supplementation.  -trend.      #Chronic chest pain  #Persistent tachycardia  #H/o cardiac arrest 2/2 PE  Present since cardiac arrest in 5/2019. Takes Tylenol at home which provides inconsistent relief. EKG on admission with sinus tachycardia. Troponin and D-dimer negative. BNP wnl, bedside ultrasound unremarkable.- CT coronary angiogram completed 6/1, unremarkable  -monitor consider EKG and troponin if develops new/ worsening symptoms     #H/o PE  Takes Xarelto PTA.  CT negative for PE on 5/30. PTA Xarelto held from admission until lumbar puncture on 6/2.   -continue Xarelto 20 mg daily        #Constipation, improved  #Hemorrhoids  Multiple stools streaked with bright red blood. Hemorrhoids noted per nursing exam.   - Miralax daily prn, senna-docusate BID prn  - Preparation H BID prn for hemorrhoids     #H/o COVID-19 infection (tested positive on 4/18 or 4/19)   COVID-19 negative on admission to hospital.        5. Adjustment to disability:  Monitor mood  6. FEN: reg  7. Bowel: continent  8. Bladder: continent  9. DVT Prophylaxis: ambulation  10. GI Prophylaxis: reg diet.   11. Code: full  12. Disposition: home  13. ELOS:  ~7 days  14. Rehab prognosis:  fair  15. Follow up Appointments on Discharge: pcp, neurology          Lemuel Barber MD  Department of Rehabilitation Medicine  Pager: [phone]    Time Spent on this Encounter   I, Lemuel Barber, spent a total of 25 minutes face-to-face or managing the care of Hilaria Bonner. Over 50% of my time on the unit was spent counseling the patient and coordinating care. See note for details.

## 2020-06-07 NOTE — PLAN OF CARE
FOCUS/GOAL  Pain management and Safety management    ASSESSMENT, INTERVENTIONS AND CONTINUING PLAN FOR GOAL:    Pt is alert and oriented by 4. Complains of pain in both the hands and feet, most the pain coming from the hands. Pt stated she felt the medications are not controlling her pain and it disturbed her sleep. Requested to talk with physician regarding pain management and sleep, physician raised amitriptyline dose at bedtime to help promote sleep. PRN pain medication given throughout the day including, tramadol, tylenol and atarax. All PRN pain medications can be given every 6 hours. Continent of bowel and bladder. Continues to be an assist of one/SBA with a walker and gait belt.Continue with plan of care.

## 2020-06-07 NOTE — PLAN OF CARE
FOCUS/GOAL  Bladder management, Pain management, Psychosocial needs, and Safety management    ASSESSMENT, INTERVENTIONS AND CONTINUING PLAN FOR GOAL:    Pt is alert and oriented by 4. Complains of pain on all extremities but per pt report the worst pain is in both of her hands. Pt was given PRN tramadol, atarax, tylenol and scheduled lyrica but still reported uncontrolled pain. Writer observed no nonverbal signs of pain. Remains an assist of one more so stand by with a walker and gait belt. Appetite was good this evening ate 100% of dinner. Pt had small BM today. Will continue with plan of care.

## 2020-06-07 NOTE — PLAN OF CARE
FOCUS/GOAL  Bowel management, Bladder management, and Pain management    ASSESSMENT, INTERVENTIONS AND CONTINUING PLAN FOR GOAL:  Pt slept well.   SBA  with walker and Gb to transfer.  Cont of bladder in the toilet, had cont of BM  In the toilet this morning. Pt requested for Pain medication and Miralax because of the hard of stool.  C/o pain at hands, legs, and feet, PRN tylenol and atarax offered and effective.  Call light in reach and  bed alarm on.

## 2020-06-07 NOTE — PROGRESS NOTES
OT: -5 PM session due to fatigue near end of session.     Completed full shower with ADLs to follow. Pt is SBA for UB dressing; CGA for LB dressing. Pt is SBA/set up for g/h and oral cares while seated in chair. Pt is SBA-CGA for bathing using shower bench and grab bars for intermittent standing. Pt able to wash/rinse/dry 10/10 body parts while seated and intermittently standing with CGA. Pt is CGA using FWW for shower transfer. Pt tolerated shower well and reported she enjoyed overall but did have bilateral hand pain with repetitive functional use. Pt reports she typically does not like to get hands wet due to them cramping up shortly after. She reports she would like to utilize gloves for her next shower.

## 2020-06-07 NOTE — PLAN OF CARE
PT- Pt progressing towards goals and is CGA for mobility with use of FWW. Pt ambulating up to 200' and up to 12 stairs. Coordination/ataxia with walking is improving. Pt continues to be motivated and pain has not been a barrier during session.  Continue focus on balance, coordination and strengthening.

## 2020-06-08 ENCOUNTER — APPOINTMENT (OUTPATIENT)
Dept: PHYSICAL THERAPY | Facility: CLINIC | Age: 30
End: 2020-06-08
Payer: COMMERCIAL

## 2020-06-08 ENCOUNTER — APPOINTMENT (OUTPATIENT)
Dept: OCCUPATIONAL THERAPY | Facility: CLINIC | Age: 30
End: 2020-06-08
Payer: COMMERCIAL

## 2020-06-08 LAB
ANION GAP SERPL CALCULATED.3IONS-SCNC: 3 MMOL/L (ref 3–14)
BUN SERPL-MCNC: 13 MG/DL (ref 7–30)
CALCIUM SERPL-MCNC: 8.6 MG/DL (ref 8.5–10.1)
CHLORIDE SERPL-SCNC: 111 MMOL/L (ref 94–109)
CO2 SERPL-SCNC: 27 MMOL/L (ref 20–32)
CREAT SERPL-MCNC: 0.6 MG/DL (ref 0.52–1.04)
ERYTHROCYTE [DISTWIDTH] IN BLOOD BY AUTOMATED COUNT: 20.4 % (ref 10–15)
GFR SERPL CREATININE-BSD FRML MDRD: >90 ML/MIN/{1.73_M2}
GLUCOSE SERPL-MCNC: 88 MG/DL (ref 70–99)
HCT VFR BLD AUTO: 26.6 % (ref 35–47)
HGB BLD-MCNC: 8.3 G/DL (ref 11.7–15.7)
MCH RBC QN AUTO: 35.3 PG (ref 26.5–33)
MCHC RBC AUTO-ENTMCNC: 31.2 G/DL (ref 31.5–36.5)
MCV RBC AUTO: 113 FL (ref 78–100)
PLATELET # BLD AUTO: 456 10E9/L (ref 150–450)
POTASSIUM SERPL-SCNC: 4.2 MMOL/L (ref 3.4–5.3)
RBC # BLD AUTO: 2.35 10E12/L (ref 3.8–5.2)
SODIUM SERPL-SCNC: 141 MMOL/L (ref 133–144)
WBC # BLD AUTO: 6.2 10E9/L (ref 4–11)

## 2020-06-08 PROCEDURE — 25000132 ZZH RX MED GY IP 250 OP 250 PS 637: Performed by: PHYSICAL MEDICINE & REHABILITATION

## 2020-06-08 PROCEDURE — 25000132 ZZH RX MED GY IP 250 OP 250 PS 637: Performed by: PHYSICIAN ASSISTANT

## 2020-06-08 PROCEDURE — 97760 ORTHOTIC MGMT&TRAING 1ST ENC: CPT | Mod: GO

## 2020-06-08 PROCEDURE — 97535 SELF CARE MNGMENT TRAINING: CPT | Mod: GO

## 2020-06-08 PROCEDURE — 36415 COLL VENOUS BLD VENIPUNCTURE: CPT | Performed by: PHYSICIAN ASSISTANT

## 2020-06-08 PROCEDURE — 12800006 ZZH R&B REHAB

## 2020-06-08 PROCEDURE — 80048 BASIC METABOLIC PNL TOTAL CA: CPT | Performed by: PHYSICIAN ASSISTANT

## 2020-06-08 PROCEDURE — 97112 NEUROMUSCULAR REEDUCATION: CPT | Mod: GP

## 2020-06-08 PROCEDURE — 85027 COMPLETE CBC AUTOMATED: CPT | Performed by: PHYSICIAN ASSISTANT

## 2020-06-08 PROCEDURE — 97110 THERAPEUTIC EXERCISES: CPT | Mod: GP

## 2020-06-08 RX ADMIN — Medication 1 MG: at 22:45

## 2020-06-08 RX ADMIN — HYDROXYZINE HYDROCHLORIDE 50 MG: 25 TABLET ORAL at 16:57

## 2020-06-08 RX ADMIN — PREGABALIN 150 MG: 75 CAPSULE ORAL at 08:51

## 2020-06-08 RX ADMIN — POTASSIUM CHLORIDE 10 MEQ: 750 TABLET, EXTENDED RELEASE ORAL at 21:19

## 2020-06-08 RX ADMIN — HYDROXYZINE HYDROCHLORIDE 50 MG: 25 TABLET ORAL at 08:50

## 2020-06-08 RX ADMIN — Medication 1 TABLET: at 08:53

## 2020-06-08 RX ADMIN — Medication 1 TABLET: at 21:19

## 2020-06-08 RX ADMIN — THERA TABS 1 TABLET: TAB at 08:55

## 2020-06-08 RX ADMIN — PREGABALIN 150 MG: 75 CAPSULE ORAL at 13:20

## 2020-06-08 RX ADMIN — OXYCODONE HYDROCHLORIDE AND ACETAMINOPHEN 1000 MG: 500 TABLET ORAL at 08:53

## 2020-06-08 RX ADMIN — TRAMADOL HYDROCHLORIDE 50 MG: 50 TABLET, FILM COATED ORAL at 13:20

## 2020-06-08 RX ADMIN — ACETAMINOPHEN 650 MG: 325 TABLET, FILM COATED ORAL at 16:57

## 2020-06-08 RX ADMIN — ACETAMINOPHEN 650 MG: 325 TABLET, FILM COATED ORAL at 22:45

## 2020-06-08 RX ADMIN — TIZANIDINE 4 MG: 2 TABLET ORAL at 22:47

## 2020-06-08 RX ADMIN — HYDROXYZINE HYDROCHLORIDE 50 MG: 25 TABLET ORAL at 22:45

## 2020-06-08 RX ADMIN — HYDROXYZINE HYDROCHLORIDE 50 MG: 25 TABLET ORAL at 02:36

## 2020-06-08 RX ADMIN — Medication 1 G: at 18:18

## 2020-06-08 RX ADMIN — AMITRIPTYLINE HYDROCHLORIDE 50 MG: 25 TABLET, FILM COATED ORAL at 22:45

## 2020-06-08 RX ADMIN — POLYETHYLENE GLYCOL 3350 17 G: 17 POWDER, FOR SOLUTION ORAL at 08:52

## 2020-06-08 RX ADMIN — ACETAMINOPHEN 650 MG: 325 TABLET, FILM COATED ORAL at 02:32

## 2020-06-08 RX ADMIN — POTASSIUM CHLORIDE 10 MEQ: 750 TABLET, EXTENDED RELEASE ORAL at 08:54

## 2020-06-08 RX ADMIN — THIAMINE HCL TAB 100 MG 100 MG: 100 TAB at 08:54

## 2020-06-08 RX ADMIN — TRAMADOL HYDROCHLORIDE 50 MG: 50 TABLET, FILM COATED ORAL at 02:32

## 2020-06-08 RX ADMIN — RIVAROXABAN 20 MG: 10 TABLET, FILM COATED ORAL at 08:55

## 2020-06-08 RX ADMIN — MELATONIN 1000 UNITS: at 08:54

## 2020-06-08 RX ADMIN — ACETAMINOPHEN 650 MG: 325 TABLET, FILM COATED ORAL at 08:50

## 2020-06-08 RX ADMIN — PREGABALIN 150 MG: 75 CAPSULE ORAL at 21:19

## 2020-06-08 RX ADMIN — TRAMADOL HYDROCHLORIDE 50 MG: 50 TABLET, FILM COATED ORAL at 19:18

## 2020-06-08 RX ADMIN — FOLIC ACID 1 MG: 1 TABLET ORAL at 08:54

## 2020-06-08 ASSESSMENT — MIFFLIN-ST. JEOR: SCORE: 1897.51

## 2020-06-08 NOTE — PROGRESS NOTES
Pt with stable vitals. Cooperative with POC. Pt stating significant hands and feet pain - medicated with prescribed medications, helps a little but not much per pt. Pulses present, extremities warm to touch. Mild edema in lower extremities, pt reminded to elevate legs as much as possible. Audible bowels, requested and give Miralax - stating history of hemoroids and last BM (yesterday) was hard. Pt and RN discussed spacing pain meds for better pain control. RN to encourage pm staff to give PRN bowel meds. Will continue to monitor extremities pain, edema and bowels.

## 2020-06-08 NOTE — PLAN OF CARE
FOCUS/GOAL  Bowel management, Bladder management, and Pain management    ASSESSMENT, INTERVENTIONS AND CONTINUING PLAN FOR GOAL:  Pt slept well.  SBA, GB and walker to transfer.  Cont of bladder and bowel , uses toilet, LBM on 6/7.   Called for pain medication and c/o hands, legs, and feet pain. Pt preferes to have all 3 PRN pain meds at the same time, which was offered at 0230.

## 2020-06-08 NOTE — PLAN OF CARE
Pt alert and oriented x4. VSS. No complaints of shortness of breath or chest pain. Had complaints of pain in her hands and LE. See MAR for med administration. Has numbness and tingling of upper and lower extremities. SBA, with walker and gait belt. Regular diet, thin liquids. Takes pills whole. Continent of bowel and bladder. LBM: 6/7. SCDs on pt. Legs elevated on pillows. Able to make her needs known. Will continue with plan of care.

## 2020-06-08 NOTE — PLAN OF CARE
PT:  pt making good progress despite pain all over.  Mod I with amb in her room and to/from bathroom.  Continued focus on balance and coordination for increased independence with functional mobility.

## 2020-06-08 NOTE — PROGRESS NOTES
Pt seen this date for dry tub/shower transfer mod I side stepping with hands on side wall. Pt mod I shower bench transfer and low 16 inch shower bench transfer to simulate standard toilet mod I,  Working on order  for tub chair for pt for discharge. Pt issued built ups for utincils

## 2020-06-08 NOTE — PROGRESS NOTES
[facility]   Acute Rehabilitation Unit  Daily progress note  DATE OF ENCOUNTER 06/08/20    INTERVAL HISTORY  Hilaria Bonner was seen and examined at bedside.     Hilaria Bonner is a 29 year old female who presents with Small-fiber sensory neuropathy, suspected 2/2 nutritional deficiency in the setting of morbid obesity, sleeve gastrectomy (3/2019), PE complicated by cardiac arrest (5/2019) now on Xarelto, and secondary hypoparathyroidism   Admiited to the ARU on 6/5/2020 for intense rehabilitation.   Deficits include     Active issues:         Functionally,         Medically   #Small-fiber sensory neuropathy, suspected 2/2 nutritional deficiency  Painful paresthesias in bilateral upper and lower extremities, distal>proximal, with preserved strength.   MRI brain and cervical spine normal. CSF protein, glucose, and cell counts normal. ESR and CRP elevated, ANH weakly positive.   Highest suspicion at this point is for vitamin deficiency given h/o bariatric surgery, noncompliance with folate supplementation, and recent poor PO intake related to acute illness.     Folate remarkably low at 1.4. Vitamins E, B6, and 12 wnl (of note, receives monthly B12 injections with last one on 5/20). Supplemented throughout this admission with high-dose thiamine and folate. Symptoms have improved since admission.  - thiamine 100 mg daily  - folic acid 1 mg PO daily  - Citracal 1 tablet PO daily  - Thera-vit 1 tablet PO daily  - vitamin C 1,000 mg daily  - vitamin D3 1,000 units daily  - pregabalin 100 mg TID  - gabapentin topical cream  - tramadol 50 mg TID prn  - tizanidine 4 mg at bedtime prn  -started on lyrica  -also on elavil 50 mg at bedtime.   - patient presents with significant pain complaints and a presentation of being in general comfort. Will exercise caution in adding nay further medications.          #Elevated total bilirubin  #Elevated alkaline phosphatase  Afebrile and normal WBC  make hepatobiliary pathology less likely, but recent uptrend in bilirubin is concerning for cholestatic process. RUQ US with possible hepatic steatosis; patient endorses remote history of possible alcohol overuse.   -was on average 4 drinks per week prior to admit, feels this is no longer a problem.        #Hemorrhoids  Multiple stools since admission, varying hardness and streaked with bright red blood. Hemorrhoids noted per nursing exam.   - Miralax daily  - senna-docusate BID prn       #Macrocytic anemia  -No evidence of active bleeding. MCV elevated to 116 consistent with megaloblastic anemia likely 2/2 known h/o vitamin B12 deficiency (currently wnl at 502 after recent injection on 5/20).   -Iron studies notable for high iron, low iron binding capacity, and elevated ferritin likely reflect iron supplementation.  -hemoglobin is low ongoing and cause of further fatigue.        #H/o cardiac arrest 2/2 PE  Present since cardiac arrest in 5/2019. Takes Tylenol at home which provides inconsistent relief. EKG on admission with sinus tachycardia. Troponin and D-dimer negative. BNP wnl, bedside ultrasound unremarkable. Patient has remained hemodynamically stable. Scheduled for coronary artery CT angiogram as outpatient tomorrow, but patient is currently hospitalized and does endorse ongoing chest pain.  - CT coronary angiogram completed 6/1, unremarkable  - EKG and troponin if develops new or worsening symptoms     #H/o PE  -Takes Xarelto PTA. Endorses compliance.  -currently on xerolta at 50 mg qhs  -INR goal 2.0-3.0, was 1.57 on admission.   -CT negative for PE on 5/30.   -PTA Xarelto held from admission until lumbar puncture on 6/2.           MEDICATIONS  Scheduled meds    amitriptyline  50 mg Oral At Bedtime     calcium citrate-vitamin D  1 tablet Oral BID     [START ON 6/16/2020] cyanocobalamin  1,000 mcg Intramuscular Q30 Days     folic acid  1 mg Oral Daily     gabapentin  1 g Topical Q8H     melatonin  1 mg Oral  "At Bedtime     multivitamin, therapeutic  1 tablet Oral Daily     potassium chloride ER  10 mEq Oral BID     pregabalin  150 mg Oral TID     rivaroxaban ANTICOAGULANT  20 mg Oral Daily with breakfast     vitamin B1  100 mg Oral Daily     vitamin C  1,000 mg Oral Daily     Vitamin D3  1,000 Units Oral Daily       PRN meds:  acetaminophen, hydrOXYzine, naloxone, - MEDICATION INSTRUCTIONS -, phenylephrine-shark liver oil-mineral oil-petrolatum, polyethylene glycol, tiZANidine, traMADol, traZODone      PHYSICAL EXAM  /88 (BP Location: Left arm)   Pulse 117   Temp 99.3  F (37.4  C) (Oral)   Resp 12   Ht 1.707 m (5' 7.2\")   Wt 113.7 kg (250 lb 9.6 oz)   SpO2 100%   BMI 39.02 kg/m    Gen: sitting at the edge of bed   Comfortable   Speech is intact   Comprehension is remarkable  HEENT: NC AT PRTL   Cardio: S1S2  Abd: benign   Ext: mild edema   Neuro/MSK: seeing with therapy, walking with a walker, modified independent slow gait.     LABS  Results for RADHA JOHANSEN (MRN [id number]) as of 6/8/2020 16:51   Ref. Range 6/8/2020 07:27   Sodium Latest Ref Range: 133 - 144 mmol/L 141   Potassium Latest Ref Range: 3.4 - 5.3 mmol/L 4.2   Chloride Latest Ref Range: 94 - 109 mmol/L 111 (H)   Carbon Dioxide Latest Ref Range: 20 - 32 mmol/L 27   Urea Nitrogen Latest Ref Range: 7 - 30 mg/dL 13   Creatinine Latest Ref Range: 0.52 - 1.04 mg/dL 0.60   GFR Estimate Latest Ref Range: >60 mL/min/1.73_m2 >90   GFR Estimate If Black Latest Ref Range: >60 mL/min/1.73_m2 >90   Calcium Latest Ref Range: 8.5 - 10.1 mg/dL 8.6   Anion Gap Latest Ref Range: 3 - 14 mmol/L 3   Glucose Latest Ref Range: 70 - 99 mg/dL 88   WBC Latest Ref Range: 4.0 - 11.0 10e9/L 6.2   Hemoglobin Latest Ref Range: 11.7 - 15.7 g/dL 8.3 (L)   Hematocrit Latest Ref Range: 35.0 - 47.0 % 26.6 (L)   Platelet Count Latest Ref Range: 150 - 450 10e9/L 456 (H)   RBC Count Latest Ref Range: 3.8 - 5.2 10e12/L 2.35 (L)   MCV Latest Ref Range: 78 - 100 fl 113 (H)   MCH " Latest Ref Range: 26.5 - 33.0 pg 35.3 (H)   MCHC Latest Ref Range: 31.5 - 36.5 g/dL 31.2 (L)   RDW Latest Ref Range: 10.0 - 15.0 % 20.4 (H)           Tiffanie Cruz MD, A   Physical Medicine & Rehabilitation    I spent a total of 35 minutes face-to-face or managing the care of Hilaria Bonner. Over 50% of my time on the unit was spent counseling the patient and coordinating care. See note for details.

## 2020-06-09 ENCOUNTER — APPOINTMENT (OUTPATIENT)
Dept: OCCUPATIONAL THERAPY | Facility: CLINIC | Age: 30
End: 2020-06-09
Payer: COMMERCIAL

## 2020-06-09 ENCOUNTER — APPOINTMENT (OUTPATIENT)
Dept: PHYSICAL THERAPY | Facility: CLINIC | Age: 30
End: 2020-06-09
Payer: COMMERCIAL

## 2020-06-09 PROCEDURE — 97110 THERAPEUTIC EXERCISES: CPT | Mod: GP

## 2020-06-09 PROCEDURE — 25000132 ZZH RX MED GY IP 250 OP 250 PS 637: Performed by: PHYSICIAN ASSISTANT

## 2020-06-09 PROCEDURE — 97112 NEUROMUSCULAR REEDUCATION: CPT | Mod: GP

## 2020-06-09 PROCEDURE — 97530 THERAPEUTIC ACTIVITIES: CPT | Mod: GP

## 2020-06-09 PROCEDURE — 97535 SELF CARE MNGMENT TRAINING: CPT | Mod: GO | Performed by: OCCUPATIONAL THERAPIST

## 2020-06-09 PROCEDURE — 97535 SELF CARE MNGMENT TRAINING: CPT | Mod: GO

## 2020-06-09 PROCEDURE — 25000132 ZZH RX MED GY IP 250 OP 250 PS 637: Performed by: PHYSICAL MEDICINE & REHABILITATION

## 2020-06-09 PROCEDURE — 12800006 ZZH R&B REHAB

## 2020-06-09 RX ORDER — LISINOPRIL 10 MG/1
10 TABLET ORAL DAILY
Status: DISCONTINUED | OUTPATIENT
Start: 2020-06-09 | End: 2020-06-11 | Stop reason: HOSPADM

## 2020-06-09 RX ADMIN — LISINOPRIL 10 MG: 10 TABLET ORAL at 13:03

## 2020-06-09 RX ADMIN — PREGABALIN 150 MG: 75 CAPSULE ORAL at 13:03

## 2020-06-09 RX ADMIN — THIAMINE HCL TAB 100 MG 100 MG: 100 TAB at 08:05

## 2020-06-09 RX ADMIN — OXYCODONE HYDROCHLORIDE AND ACETAMINOPHEN 1000 MG: 500 TABLET ORAL at 08:04

## 2020-06-09 RX ADMIN — AMITRIPTYLINE HYDROCHLORIDE 50 MG: 25 TABLET, FILM COATED ORAL at 22:03

## 2020-06-09 RX ADMIN — ACETAMINOPHEN 650 MG: 325 TABLET, FILM COATED ORAL at 14:11

## 2020-06-09 RX ADMIN — ACETAMINOPHEN 650 MG: 325 TABLET, FILM COATED ORAL at 08:04

## 2020-06-09 RX ADMIN — PREGABALIN 150 MG: 75 CAPSULE ORAL at 22:03

## 2020-06-09 RX ADMIN — THERA TABS 1 TABLET: TAB at 08:04

## 2020-06-09 RX ADMIN — Medication 1 G: at 09:44

## 2020-06-09 RX ADMIN — Medication 1 MG: at 22:03

## 2020-06-09 RX ADMIN — TRAMADOL HYDROCHLORIDE 50 MG: 50 TABLET, FILM COATED ORAL at 22:03

## 2020-06-09 RX ADMIN — FOLIC ACID 1 MG: 1 TABLET ORAL at 08:05

## 2020-06-09 RX ADMIN — HYDROXYZINE HYDROCHLORIDE 50 MG: 25 TABLET ORAL at 08:04

## 2020-06-09 RX ADMIN — Medication 1 G: at 16:28

## 2020-06-09 RX ADMIN — POTASSIUM CHLORIDE 10 MEQ: 750 TABLET, EXTENDED RELEASE ORAL at 08:05

## 2020-06-09 RX ADMIN — POLYETHYLENE GLYCOL 3350 17 G: 17 POWDER, FOR SOLUTION ORAL at 08:04

## 2020-06-09 RX ADMIN — MELATONIN 1000 UNITS: at 08:05

## 2020-06-09 RX ADMIN — TRAMADOL HYDROCHLORIDE 50 MG: 50 TABLET, FILM COATED ORAL at 16:25

## 2020-06-09 RX ADMIN — TIZANIDINE 4 MG: 2 TABLET ORAL at 22:03

## 2020-06-09 RX ADMIN — PREGABALIN 150 MG: 75 CAPSULE ORAL at 08:03

## 2020-06-09 RX ADMIN — HYDROXYZINE HYDROCHLORIDE 50 MG: 25 TABLET ORAL at 14:11

## 2020-06-09 RX ADMIN — Medication 25 MG: at 22:08

## 2020-06-09 RX ADMIN — TRAMADOL HYDROCHLORIDE 50 MG: 50 TABLET, FILM COATED ORAL at 09:42

## 2020-06-09 RX ADMIN — TRAMADOL HYDROCHLORIDE 50 MG: 50 TABLET, FILM COATED ORAL at 02:55

## 2020-06-09 RX ADMIN — RIVAROXABAN 20 MG: 10 TABLET, FILM COATED ORAL at 08:04

## 2020-06-09 RX ADMIN — Medication 1 TABLET: at 08:04

## 2020-06-09 RX ADMIN — Medication 1 TABLET: at 22:03

## 2020-06-09 NOTE — PROGRESS NOTES
[facility]   Acute Rehabilitation Unit  Daily progress note    INTERVAL HISTORY  Seen sitting up in bed, reports she is overall doing well. Ongoing pain stable. No new complaints or concerns. OT would like to extend her stay an additional day as she has not yet demonstrated safety in kitchen patient feels she would not be safe without this trial, will plan for discharge 6/11. Additionally BP has been mildly elevated for duration of hospitalization, 130s-150s no new symptoms will start lisinopril 10 mg daily- and monitor.     PT: NuStep and dynamic balance were focus of session, some ambulation with handhold assist, no FWW. Pt struggles with stability when support is removed.    OT initiated education of fww with ambulation in kitchen retrieving item from refridgerator pt able to retrieve item from low shelf in refrigerator and move item on counter while using fww. Pt able to stand and prep toast sba.     MEDICATIONS  Scheduled:    amitriptyline  50 mg Oral At Bedtime     calcium citrate-vitamin D  1 tablet Oral BID     [START ON 6/16/2020] cyanocobalamin  1,000 mcg Intramuscular Q30 Days     folic acid  1 mg Oral Daily     gabapentin  1 g Topical Q8H     lisinopril  10 mg Oral Daily     melatonin  1 mg Oral At Bedtime     multivitamin, therapeutic  1 tablet Oral Daily     pregabalin  150 mg Oral TID     rivaroxaban ANTICOAGULANT  20 mg Oral Daily with breakfast     vitamin B1  100 mg Oral Daily     vitamin C  1,000 mg Oral Daily     Vitamin D3  1,000 Units Oral Daily        PRN:  acetaminophen, hydrOXYzine, naloxone, - MEDICATION INSTRUCTIONS -, phenylephrine-shark liver oil-mineral oil-petrolatum, polyethylene glycol, tiZANidine, traMADol, traZODone       PHYSICAL EXAM  Patient Vitals for the past 24 hrs:   BP Temp Temp src Pulse Resp SpO2   06/09/20 1556 134/87 97.5  F (36.4  C) Oral 121 18 99 %   06/09/20 1502 (!) 133/90 -- -- 124 -- --   06/09/20 1413 (!) 147/94 -- -- 119  -- --   06/09/20 1301 (!) 154/98 -- -- 107 -- --   06/09/20 1225 (!) 158/100 -- -- 115 -- --   06/09/20 0940 (!) 143/91 -- -- 109 -- --   06/09/20 0928 (!) 144/95 97.2  F (36.2  C) Oral 105 20 94 %   06/08/20 2300 (!) 143/102 -- -- 104 18 100 %       GEN: NAD, pleasant and cooperative  HEENT: NC/AT  CVS: RRR, S1+S2, no m/r/g  PULM: CTA b/l, no w/r/r  ABD: Soft, NT, ND, bowel sounds present  EXT: +LE edema or calf tenderness b/l  Neuro: Answers appropriately, follows commands seen ambulating down archibald with walker with PT    LABS  CBC RESULTS:   Recent Labs   Lab Test 05/30/20  0450   WBC 8.5   RBC 2.25*   HGB 8.6*   HCT 26.1*   *   MCH 38.2*   MCHC 33.0   RDW 21.5*   *       Last Comprehensive Metabolic Panel:  Sodium   Date Value Ref Range Status   06/08/2020 141 133 - 144 mmol/L Final     Potassium   Date Value Ref Range Status   06/08/2020 4.2 3.4 - 5.3 mmol/L Final     Chloride   Date Value Ref Range Status   06/08/2020 111 (H) 94 - 109 mmol/L Final     Carbon Dioxide   Date Value Ref Range Status   06/08/2020 27 20 - 32 mmol/L Final     Anion Gap   Date Value Ref Range Status   06/08/2020 3 3 - 14 mmol/L Final     Glucose   Date Value Ref Range Status   06/08/2020 88 70 - 99 mg/dL Final     Urea Nitrogen   Date Value Ref Range Status   06/08/2020 13 7 - 30 mg/dL Final     Creatinine   Date Value Ref Range Status   06/08/2020 0.60 0.52 - 1.04 mg/dL Final     GFR Estimate   Date Value Ref Range Status   06/08/2020 >90 >60 mL/min/[1.73_m2] Final     Comment:     Non  GFR Calc  Starting 12/18/2018, serum creatinine based estimated GFR (eGFR) will be   calculated using the Chronic Kidney Disease Epidemiology Collaboration   (CKD-EPI) equation.       Calcium   Date Value Ref Range Status   06/08/2020 8.6 8.5 - 10.1 mg/dL Final       Recent Labs   Lab 06/08/20  0727 06/05/20  0852 06/05/20  0013 06/04/20  0013 06/03/20  0959   GLC 88  --   --   --   --    BGM  --  82 114* 92 110*        IMPRESSION/PLAN:  Hilaria Bonner is a 29 year old woman with past medical history of PE with associated PEA arrest, morbid obesity s/p sleeve gastrectomy, pancreatitis, and recent COVID 19 who presented 5/29/20 with 4 week history of paresthesias, RUE tremor and slowed speech admitted and underwent extensive workup findings of which make vitamin deficiency highest on current differential. She was admitted to acute rehab on 6/5/20  for ongoing rehabilitation and medical management.         Admission to acute inpatient rehab polyneuropathy suspected 2/2 nutritional deficiency  Impairment group code: 03.3        1. PT, OT 90 minutes of each on a daily basis, in addition to rehab nursing and close management of physiatrist.       2. Impairment of ADL's: Noted to have impaired sensation, activity tolerance, and strength goals for MOD I to I with basic ADLS.      3. Impairment of mobility:  Noted to have impaired sensation, activity tolerance, and strength goals for MOD I to I with basic mobility.         4. 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 Underwent extensive workup per neurology positive findings:  ESR and CRP elevated, ANH weakly positive.Folate remarkably low at 1.4. LP and Brain/Cervical MRI neg.  Highest suspicion at this point is for vitamin deficiency given h/o bariatric surgery, noncompliance with folate supplementation, and recent poor PO intake related to acute illness. Differential includes post-infectious autoimmune neuropathy in the setting of recent COVID-19 infection. Rheumatologic etiology.   -continue thiamine 100 mg daily,  folic acid 1 mg PO daily,  Citracal 1 tablet PO daily, multivitamin, vitamin C 1,000 mg daily & vitamin D3 1,000 units daily  Pain: continue pregabalin to 150 mg TID (increased 6/4) Amitriptyline to 50 mg qHS dose  increased 6/7   gabapentin topical cream, tramadol 50 mg TID prn, tylenol prn, tizanidine 4 mg at bedtime prn  - PT/OT as tolerated  - Follow up with neurology in 1-2 months as outpatient     #Macrocytic anemia  Hgb. 8.3 6/8.  No evidence of active bleeding. MCV elevated to 116 consistent with megaloblastic anemia likely 2/2 known h/o vitamin B12 deficiency (currently wnl at 502 after recent injection on 5/20). Iron studies notable for high iron, low iron binding capacity, and elevated ferritin likely reflect iron supplementation.  -trend.   -continue supplementation as above.      #Chronic chest pain  #Persistent tachycardia  #H/o cardiac arrest 2/2 PE  Present since cardiac arrest in 5/2019. Takes Tylenol at home which provides inconsistent relief. EKG on admission with sinus tachycardia. Troponin and D-dimer negative. BNP wnl, bedside ultrasound unremarkable.- CT coronary angiogram completed 6/1, unremarkable  -monitor consider EKG and troponin if develops new/ worsening symptoms     #H/o PE  Takes Xarelto PTA.  CT negative for PE on 5/30. PTA Xarelto held from admission until lumbar puncture on 6/2.   -continue Xarelto 20 mg daily    #HTN- BP elevated 130s-150s consistently.  -start lisinopril 10 mg daily and monitor        #Constipation, improved  #Hemorrhoids  Multiple stools streaked with bright red blood. Hemorrhoids noted per nursing exam.   - Miralax daily prn, senna-docusate BID prn  - Preparation H BID prn for hemorrhoids     #H/o COVID-19 infection (tested positive on 4/18 or 4/19)   COVID-19 negative on admission to hospital.        5. Adjustment to disability:  Monitor mood  6. FEN: reg  7. Bowel: continent  8. Bladder: continent  9. DVT Prophylaxis: ambulation  10. GI Prophylaxis: reg diet.   11. Code: full  12. Disposition: home  13. ELOS:  6/11  14. Rehab prognosis:  fair  15. Follow up Appointments on Discharge: pcp, neurology            Mimi Nuno PA-C  PM&R

## 2020-06-09 NOTE — PLAN OF CARE
OT initiated education of fww with ambulation in kitchen retrieving item from refridgerator pt able to retrieve item from low shelf in refrigerator and move item on counter while using fww. Pt able to stand and prep toast sba.

## 2020-06-09 NOTE — PLAN OF CARE
Pt is alert and oriented. Stable. BP elevated with no symptoms. MD notified. Will re-evaluate in AM. Mod I in room with walker. Pain well managed with Tramadol, Tylenol and Atarax taken when due. Able to make her needs known. Edema in bilateral legs, encouraged to elevate legs. No complaints will continue with plan of care.

## 2020-06-09 NOTE — PLAN OF CARE
PT: NuStep and dynamic balance were focus of session, some ambulation with handhold assist, no FWW. Pt struggles with stability when support is removed.

## 2020-06-09 NOTE — DISCHARGE INSTRUCTIONS
Follow Up Appointments     - Follow up with neurology  You are scheduled to see Dr. Clare Arce on Monday, June 15th at 2:30 PM. This is scheduled as a video visit. If you have questions prior to this appointment, you may call the clinic at [phone].    Address  [facility] - Neurology  Phone   [phone]    - Follow up with primary care provider, Crissy Madrigal PA-C  You are scheduled to see Crissy Madrigal PA-C on Friday, June 19th at 1:20 PM. This is scheduled as a telephone visit. They will call you at [phone]. If you would like to have them call you on a different number you may call the clinic at [phone].    Address  [facility]  Phone   [phone]

## 2020-06-09 NOTE — PROGRESS NOTES
Patient is A&O x4. Denied CP, lightheadedness, dizziness and SOB. BP was elevated see flow sheet for detail, pt is asymptomatic, PA paged and lisinopril given, last BP at 1413 was 147/94. Pain on hands and feet managed with PRN tramadol, tylenol and atarax. Pt is Mod I with walker in her room. Continent of bowel and bladder, drinking well and voiding without difficulties, LBM 6/8 per pt report, Miralax given per pt request. Self repositioned and turned in bed, heels elevated off bed, able to use call light appropriately and make needs known, will continue to monitor patient as POC.

## 2020-06-10 ENCOUNTER — APPOINTMENT (OUTPATIENT)
Dept: OCCUPATIONAL THERAPY | Facility: CLINIC | Age: 30
End: 2020-06-10
Payer: COMMERCIAL

## 2020-06-10 ENCOUNTER — DOCUMENTATION ONLY (OUTPATIENT)
Dept: REHABILITATION | Facility: CLINIC | Age: 30
End: 2020-06-10

## 2020-06-10 ENCOUNTER — APPOINTMENT (OUTPATIENT)
Dept: PHYSICAL THERAPY | Facility: CLINIC | Age: 30
End: 2020-06-10
Payer: COMMERCIAL

## 2020-06-10 LAB
ANION GAP SERPL CALCULATED.3IONS-SCNC: 3 MMOL/L (ref 3–14)
BUN SERPL-MCNC: 15 MG/DL (ref 7–30)
CALCIUM SERPL-MCNC: 9 MG/DL (ref 8.5–10.1)
CHLORIDE SERPL-SCNC: 108 MMOL/L (ref 94–109)
CO2 SERPL-SCNC: 28 MMOL/L (ref 20–32)
CREAT SERPL-MCNC: 0.65 MG/DL (ref 0.52–1.04)
ERYTHROCYTE [DISTWIDTH] IN BLOOD BY AUTOMATED COUNT: 19.8 % (ref 10–15)
GFR SERPL CREATININE-BSD FRML MDRD: >90 ML/MIN/{1.73_M2}
GLUCOSE SERPL-MCNC: 103 MG/DL (ref 70–99)
HCT VFR BLD AUTO: 29.7 % (ref 35–47)
HGB BLD-MCNC: 9.1 G/DL (ref 11.7–15.7)
MCH RBC QN AUTO: 34.3 PG (ref 26.5–33)
MCHC RBC AUTO-ENTMCNC: 30.6 G/DL (ref 31.5–36.5)
MCV RBC AUTO: 112 FL (ref 78–100)
PLATELET # BLD AUTO: 532 10E9/L (ref 150–450)
POTASSIUM SERPL-SCNC: 4.1 MMOL/L (ref 3.4–5.3)
RBC # BLD AUTO: 2.65 10E12/L (ref 3.8–5.2)
SODIUM SERPL-SCNC: 139 MMOL/L (ref 133–144)
TROPONIN I SERPL-MCNC: <0.015 UG/L (ref 0–0.04)
WBC # BLD AUTO: 6.1 10E9/L (ref 4–11)

## 2020-06-10 PROCEDURE — 36415 COLL VENOUS BLD VENIPUNCTURE: CPT | Performed by: PHYSICIAN ASSISTANT

## 2020-06-10 PROCEDURE — 93010 ELECTROCARDIOGRAM REPORT: CPT | Performed by: INTERNAL MEDICINE

## 2020-06-10 PROCEDURE — 97530 THERAPEUTIC ACTIVITIES: CPT | Mod: GP | Performed by: PHYSICAL THERAPIST

## 2020-06-10 PROCEDURE — 25000132 ZZH RX MED GY IP 250 OP 250 PS 637: Performed by: PHYSICAL MEDICINE & REHABILITATION

## 2020-06-10 PROCEDURE — 40000187 ZZH STATISTIC PATIENT MED CONFERENCE < 30 MIN: Performed by: OCCUPATIONAL THERAPIST

## 2020-06-10 PROCEDURE — 25000132 ZZH RX MED GY IP 250 OP 250 PS 637: Performed by: PHYSICIAN ASSISTANT

## 2020-06-10 PROCEDURE — 97110 THERAPEUTIC EXERCISES: CPT | Mod: GP | Performed by: PHYSICAL THERAPIST

## 2020-06-10 PROCEDURE — 80048 BASIC METABOLIC PNL TOTAL CA: CPT | Performed by: PHYSICIAN ASSISTANT

## 2020-06-10 PROCEDURE — 40000183 ZZH STATISTIC PT MED CONFERENCE < 30 MIN: Performed by: PHYSICAL THERAPIST

## 2020-06-10 PROCEDURE — 12800006 ZZH R&B REHAB

## 2020-06-10 PROCEDURE — 85027 COMPLETE CBC AUTOMATED: CPT | Performed by: PHYSICIAN ASSISTANT

## 2020-06-10 PROCEDURE — 97110 THERAPEUTIC EXERCISES: CPT | Mod: GO

## 2020-06-10 PROCEDURE — 25000125 ZZHC RX 250: Performed by: PHYSICIAN ASSISTANT

## 2020-06-10 PROCEDURE — 93005 ELECTROCARDIOGRAM TRACING: CPT

## 2020-06-10 PROCEDURE — 97535 SELF CARE MNGMENT TRAINING: CPT | Mod: GO

## 2020-06-10 PROCEDURE — 84484 ASSAY OF TROPONIN QUANT: CPT | Performed by: PHYSICIAN ASSISTANT

## 2020-06-10 RX ORDER — LIDOCAINE HYDROCHLORIDE 40 MG/ML
10 SOLUTION TOPICAL 3 TIMES DAILY PRN
Start: 2020-06-10 | End: 2020-06-11

## 2020-06-10 RX ORDER — LISINOPRIL 10 MG/1
10 TABLET ORAL DAILY
Qty: 30 TABLET | Refills: 0 | Status: SHIPPED | OUTPATIENT
Start: 2020-06-11 | End: 2020-07-10

## 2020-06-10 RX ORDER — LANOLIN ALCOHOL/MO/W.PET/CERES
100 CREAM (GRAM) TOPICAL DAILY
Qty: 30 TABLET | Refills: 0 | Status: SHIPPED | OUTPATIENT
Start: 2020-06-10 | End: 2020-08-20

## 2020-06-10 RX ORDER — PREGABALIN 150 MG/1
150 CAPSULE ORAL 3 TIMES DAILY
Qty: 90 CAPSULE | Refills: 0 | Status: SHIPPED | OUTPATIENT
Start: 2020-06-10 | End: 2020-06-19

## 2020-06-10 RX ORDER — ACETAMINOPHEN 325 MG/1
650 TABLET ORAL EVERY 6 HOURS PRN
COMMUNITY
Start: 2020-06-10 | End: 2020-06-25

## 2020-06-10 RX ORDER — HYDROXYZINE HYDROCHLORIDE 25 MG/1
25-50 TABLET, FILM COATED ORAL EVERY 6 HOURS PRN
Qty: 150 TABLET | Refills: 0 | Status: SHIPPED | OUTPATIENT
Start: 2020-06-10 | End: 2020-10-15

## 2020-06-10 RX ORDER — POLYETHYLENE GLYCOL 3350 17 G/17G
1 POWDER, FOR SOLUTION ORAL DAILY
Qty: 850 G | Refills: 3 | COMMUNITY
Start: 2020-06-10 | End: 2023-04-20

## 2020-06-10 RX ORDER — TRAMADOL HYDROCHLORIDE 50 MG/1
50 TABLET ORAL EVERY 6 HOURS PRN
Qty: 15 TABLET | Refills: 0 | Status: SHIPPED | OUTPATIENT
Start: 2020-06-10 | End: 2020-06-19

## 2020-06-10 RX ORDER — FOLIC ACID 1 MG/1
1 TABLET ORAL DAILY
Qty: 30 TABLET | Refills: 0 | Status: SHIPPED | OUTPATIENT
Start: 2020-06-11 | End: 2020-06-19

## 2020-06-10 RX ORDER — CHOLECALCIFEROL (VITAMIN D3) 50 MCG
1 TABLET ORAL DAILY
Qty: 30 TABLET | Refills: 0 | Status: SHIPPED | OUTPATIENT
Start: 2020-06-10 | End: 2021-07-20

## 2020-06-10 RX ORDER — TRAZODONE HYDROCHLORIDE 50 MG/1
25-50 TABLET, FILM COATED ORAL
Qty: 30 TABLET | Refills: 0 | Status: SHIPPED | OUTPATIENT
Start: 2020-06-10 | End: 2020-06-19

## 2020-06-10 RX ORDER — AMITRIPTYLINE HYDROCHLORIDE 50 MG/1
50 TABLET ORAL AT BEDTIME
Qty: 30 TABLET | Refills: 0 | Status: SHIPPED | OUTPATIENT
Start: 2020-06-10 | End: 2020-06-19

## 2020-06-10 RX ORDER — MULTIVIT WITH MINERALS/LUTEIN
1000 TABLET ORAL DAILY
Qty: 30 TABLET | Refills: 0 | Status: SHIPPED | OUTPATIENT
Start: 2020-06-10 | End: 2021-12-01

## 2020-06-10 RX ORDER — LIDOCAINE HYDROCHLORIDE 40 MG/ML
10 SOLUTION TOPICAL 3 TIMES DAILY PRN
Status: DISCONTINUED | OUTPATIENT
Start: 2020-06-10 | End: 2020-06-11 | Stop reason: HOSPADM

## 2020-06-10 RX ADMIN — AMITRIPTYLINE HYDROCHLORIDE 50 MG: 25 TABLET, FILM COATED ORAL at 22:54

## 2020-06-10 RX ADMIN — THIAMINE HCL TAB 100 MG 100 MG: 100 TAB at 08:04

## 2020-06-10 RX ADMIN — HYDROXYZINE HYDROCHLORIDE 50 MG: 25 TABLET ORAL at 11:36

## 2020-06-10 RX ADMIN — ACETAMINOPHEN 650 MG: 325 TABLET, FILM COATED ORAL at 15:56

## 2020-06-10 RX ADMIN — ACETAMINOPHEN 650 MG: 325 TABLET, FILM COATED ORAL at 22:53

## 2020-06-10 RX ADMIN — Medication 1 TABLET: at 08:05

## 2020-06-10 RX ADMIN — PREGABALIN 150 MG: 75 CAPSULE ORAL at 08:05

## 2020-06-10 RX ADMIN — TRAMADOL HYDROCHLORIDE 50 MG: 50 TABLET, FILM COATED ORAL at 15:56

## 2020-06-10 RX ADMIN — ACETAMINOPHEN 650 MG: 325 TABLET, FILM COATED ORAL at 07:49

## 2020-06-10 RX ADMIN — TRAMADOL HYDROCHLORIDE 50 MG: 50 TABLET, FILM COATED ORAL at 22:54

## 2020-06-10 RX ADMIN — HYDROXYZINE HYDROCHLORIDE 50 MG: 25 TABLET ORAL at 21:04

## 2020-06-10 RX ADMIN — Medication 1 G: at 08:12

## 2020-06-10 RX ADMIN — FOLIC ACID 1 MG: 1 TABLET ORAL at 08:04

## 2020-06-10 RX ADMIN — RIVAROXABAN 20 MG: 10 TABLET, FILM COATED ORAL at 08:05

## 2020-06-10 RX ADMIN — THERA TABS 1 TABLET: TAB at 08:05

## 2020-06-10 RX ADMIN — LIDOCAINE HYDROCHLORIDE 10 ML: 40 SOLUTION TOPICAL at 13:08

## 2020-06-10 RX ADMIN — POLYETHYLENE GLYCOL 3350 17 G: 17 POWDER, FOR SOLUTION ORAL at 13:08

## 2020-06-10 RX ADMIN — Medication 1 MG: at 22:54

## 2020-06-10 RX ADMIN — PREGABALIN 150 MG: 75 CAPSULE ORAL at 21:04

## 2020-06-10 RX ADMIN — MELATONIN 1000 UNITS: at 08:13

## 2020-06-10 RX ADMIN — PREGABALIN 150 MG: 75 CAPSULE ORAL at 13:15

## 2020-06-10 RX ADMIN — OXYCODONE HYDROCHLORIDE AND ACETAMINOPHEN 1000 MG: 500 TABLET ORAL at 08:05

## 2020-06-10 RX ADMIN — TIZANIDINE 4 MG: 2 TABLET ORAL at 22:54

## 2020-06-10 RX ADMIN — TRAMADOL HYDROCHLORIDE 50 MG: 50 TABLET, FILM COATED ORAL at 07:49

## 2020-06-10 RX ADMIN — LISINOPRIL 10 MG: 10 TABLET ORAL at 08:05

## 2020-06-10 RX ADMIN — Medication 1 TABLET: at 21:04

## 2020-06-10 RX ADMIN — LIDOCAINE HYDROCHLORIDE 10 ML: 40 SOLUTION TOPICAL at 22:54

## 2020-06-10 RX ADMIN — Medication 25 MG: at 22:54

## 2020-06-10 NOTE — PROGRESS NOTES
Prior Authorization **INITIATED**    Medication: Pregabalin 150mg caps - Quantity Limit  Insurance Company: [facility] - Phone [phone] Fax [phone]  Pharmacy Filling the Rx: [facility] - [place], [place] - [address]  Filling Pharmacy Phone: [phone]  Filling Pharmacy Fax: [phone]  Start Date: 6/10/2020  Reference #: CoverMyMeds Key: [id number] - PA Case ID: [id number]  Comments:  n/a      Angela Reyez CPhT  [facility] Liaison  Pronouns: She/Her/Hers    [facility]  [address]  [address], [place] [address]   [email or web address]  [email or web address]   Phone: [phone]  Pager: [phone]  Fax: [phone]

## 2020-06-10 NOTE — PLAN OF CARE
"  VS: /84 (BP Location: Right arm)   Pulse 122   Temp 96.4  F (35.8  C) (Oral)   Resp 16   Ht 1.707 m (5' 7.2\")   Wt 113.7 kg (250 lb 9.6 oz)   SpO2 99%   BMI 39.02 kg/m     O2: Room air, no complaints of SoB. Saturations greater than 90%.   Output: Voiding spontaneously in bathroom without difficulty   Last BM: 6/8/2020. Passing flatus per pt report   Activity: MOD I in room   Skin: Intact   Pain: Pain from patient's baseline neuropathy improving and comfortably manageable with PRN tramadol and tylenol   CMS: Baseline neuropathy in bilateral lower extremities and upper extremities. AO x4. Pleasant and cooperative. Able to make needs known.   Dressing: N/a   Diet: Regular thin liquids pills whole   LDA: N/a   Equipment: Personal belongings. Walker. Call light within reach.   Plan: Discharging home tomorrow (6/11/2020)   Additional Info:        "

## 2020-06-10 NOTE — PLAN OF CARE
Patient is alert and oriented  x 4. Able to make needs known. VSS on RA. Pain comfortably manageable with PRN Tylenol & tramadol--alternating.  Mod I in her room, ambulated in hallway using walker with SBA. Independent  with bed mobility. Regular  diet. Appettie good, ate 100%. Continent of bowel & bladder. LBM yesterday.  Denies any cough, chest pain, SOB, lightheadedness or dizziness.  No Nausea or vomiting. Denies any chills of fever. Calls appropriately for help. Call within reach.         Patient's most recent vital signs are:     Vital signs:  BP: 134/87  Temp: 97.5  HR: 121  RR: 18  SpO2: 99 %     Patient does not have new respiratory symptoms.  Patient does not have new sore throat.  Patient does not have a fever greater than 99.5.

## 2020-06-10 NOTE — PLAN OF CARE
Alert and oriented X 4. Able to make needs known. Call light in reach. Offers no C/O pain during night but requests pain meds prior to therapy. Mod I in room with walker. Continent of bowel and bladder. Appears to be sleeping during rounds. Continue with plan of care.

## 2020-06-10 NOTE — PLAN OF CARE
Physical Therapy Discharge Summary    Reason for therapy discharge:    Discharged to home with outpatient therapy. 6/11/2020        Progress towards therapy goal(s). See goals on Care Plan in [facility] electronic health record for goal details.  Goals met Pt currently feels most comfortable using a ww. Has one at home    Therapy recommendation(s):    Continued therapy is recommended.  Rationale/Recommendations:  progress IND of mobility to no device, balance.

## 2020-06-10 NOTE — PLAN OF CARE
FOCUS/GOAL  Medical management    ASSESSMENT, INTERVENTIONS AND CONTINUING PLAN FOR GOAL:  Alert and oriented, uses call light to make needs known. MOD I in room with walker. Continent of bladder and bowel, no BM this shift, Appetite 100% for meals. Lidocaine solution and Atarax given for pain with relief. Tachycardia and B/P elevated this AM, recheck was 130/53. Pt is looking forward to discharge in AM.

## 2020-06-10 NOTE — PLAN OF CARE
Acute Rehab Care Conference/Team Rounds      Type: Team Rounds    Present: Gail MONTANA, Jessie Quintero, OTR/L, Alayna Decker, PT, Leticia Laura RN, Lux Cruz MD, Mimi Nuno PA-C      Discharge Barriers/Treatment/Education    Rehab Diagnosis: ***    Active Medical Co-morbidities/Prognosis: ***    Safety: Alert and oriented X 4. Able to make needs known. Using call light appropriately . Mod I in room    Pain: Pain managed with PRN Tylenol and Tramadol    Medications, Skin, Tubes/Lines: Skin WNLs, no tubes or lines present    Swallowing/Nutrition:    Bowel/Bladder: Continent of bowel and bladder. Last BM on 6/8    Psychosocial: Single, lives with her two children (ages 3 & 10) in an apartment with 8 steps to access. Pt mother lives local, is able to assist pt at discharge and is caring for pt children at this time. Anxiety and insomnia reported. Was working in a [place] PTA.     ADLs/IADLs: Pt progressing with ADLs. Pt is Mod IND transfers and short distance mobility using fww. Pt is IND UB dressing, Mod IND LB dressing, IND standing grooming, and Mod IND toileting. Pt requiring CGA shower transfer and supervision bathing. Pt requiring supervision with ambulatory IADLs using a fww. Performance limited by BUE/BLE sensory deficits causing impaired coordination and balance. Working on strategies to improve IND and safety with functional activity. Recommend support with heavier IADLs. Recommend OP OT follow up. DME/AE: extended shower bench, fww, built up handles for grooming items and utensils.    Mobility: Mod I in room w/ ww.    Cognition/Language: Not a barrier to discharge.    Community Re-Entry: ww, limited distances    Transportation: not a  due to LE weakness, car transfer not a barrier    Decision maker: {ACR DECISION MAKER:[id number]}    Plan of Care and goals reviewed and updated.    Discharge Plan/Recommendations    Fall Precautions: {ACR FALL PRECAUTIONS:7261562}    Patient/Family input to  "goals: ***    Anticipated rehab needs following discharge: ***    Anticipated care giver support after discharge: ***    Estimated length of stay: ***    Overall plan for the patient: ***      Utilization Review and Continued Stay Justification    Medical Necessity Criteria:    For any criteria that is not met, please document reason and plan for discharge, transfer, or modification of plan of care to address.    Requires intensive rehabilitation program to treat functional deficits?: {YES/NO :021786::\"Yes\"}    Requires 3x per week or greater involvement of rehabilitation physician to oversee rehabilitation program?: {YES/NO :518946::\"Yes\"}    Requires rehabilitation nursing interventions?: {YES/NO :698701::\"Yes\"}    Patient is making functional progress?: {YES/NO :379154::\"Yes\"}    There is a potential for additional functional progress? {YES/NO :301933::\"Yes\"}    Patient is participating in therapy 3 hours per day a minimum of 5 days per week or 15 hours per week in 7 day period?:{YES/NO :855669::\"Yes\"}    Has discharge needs that require coordinated discharge planning approach?:{YES/NO :164658::\"Yes\"}      Barriers/Concerns related to meeting medical necessity criteria:  ***    Team Plan to Address Concern:  ***      Final Physician Sign off    Statement of Approval: ***    Patient Goals  Social Work Goals: Home with OP therapy and support for mother. No immediate SW needs.        OT Frequency: Daily;  min  OT goal: hygiene/grooming: modified independent, using adaptive equipment, while standing  OT goal: upper body dressing: Modified independent, using adaptive equipment, including set-up/clothing retrieval  OT goal: lower body dressing: Modified independent, using adaptive equipment, including set-up/clothing retrieval  OT goal: upper body bathing: Supervision/stand-by assist, using adaptive equipment  OT goal: lower body bathing: Supervision/stand-by assist, using adaptive equipment  OT goal: toilet " transfer/toileting: Modified independent, cleaning and garment management, using adaptive equipment  OT goal: meal preparation: Modified independent, with simple meal preparation, ambulatory level  OT goal: home management: Modified independent, with light demand household tasks, using adaptive equipment, ambulatory level  OT goal 1: Pt will demonstrate shower transfer with SBA using appropiate DME/AE simulated to home environment.          PT Frequency: daily 60-90 minutes     PT goal: transfers: Modified independent, Sit to/from stand, Bed to/from chair, Assistive device  PT goal: gait: Modified independent, Greater than 200 feet, Rolling walker  PT goal: stairs: Modified independent, Greater than 10 stairs, Rail on both sides  PT goal: perform aerobic activity with stable cardiovascular response: continuous activity, 15 minutes, ambulation, NuStep     PT goal 1: Pt will be mod I with car transfers  PT Goal 2: Pt will be SBA for floor transfers for fall prevention and recovery               {Speech Goals:548590}       Pain Management: Pt will state 2 non-pharmacological ways to help with pain by 6/12  Pt will verbalize three interventions to prevent skin breakdown throughout stay.

## 2020-06-10 NOTE — PROGRESS NOTES
"  [facility]   Acute Rehabilitation Unit  Daily progress note    INTERVAL HISTORY  Seen sitting up in bed, reports chest tightness in center of chest, with elevated bp this am and ongoing tachycardia, notes this feels consistent with previous chest tightness though \"don't typically have high blood pressure\", acknowledges some anxiety, workup unremarkable, had extensive cardiac workup while hospitalized will need f/u tachycardia and tightness with pcp, will need follow up polyneuropathy with neurology, is making gains with therapy and will discharge home with support from mom and outpatient therapy.  Denies other questions or concerns. See rounds note by Dr. Cruz for further details.     MEDICATIONS  Scheduled:    amitriptyline  50 mg Oral At Bedtime     calcium citrate-vitamin D  1 tablet Oral BID     [START ON 6/16/2020] cyanocobalamin  1,000 mcg Intramuscular Q30 Days     folic acid  1 mg Oral Daily     gabapentin  1 g Topical Q8H     lisinopril  10 mg Oral Daily     melatonin  1 mg Oral At Bedtime     multivitamin, therapeutic  1 tablet Oral Daily     pregabalin  150 mg Oral TID     rivaroxaban ANTICOAGULANT  20 mg Oral Daily with breakfast     vitamin B1  100 mg Oral Daily     vitamin C  1,000 mg Oral Daily     Vitamin D3  1,000 Units Oral Daily        PRN:  acetaminophen, hydrOXYzine, lidocaine, naloxone, - MEDICATION INSTRUCTIONS -, phenylephrine-shark liver oil-mineral oil-petrolatum, polyethylene glycol, tiZANidine, traMADol, traZODone       PHYSICAL EXAM  Patient Vitals for the past 24 hrs:   BP Temp Temp src Pulse Resp SpO2   06/10/20 1104 130/53 -- -- -- -- --   06/10/20 0805 (!) 158/96 98.4  F (36.9  C) Oral 116 16 98 %   06/09/20 1556 134/87 97.5  F (36.4  C) Oral 121 18 99 %   06/09/20 1502 (!) 133/90 -- -- 124 -- --       GEN: NAD, pleasant and cooperative  HEENT: NC/AT  CVS: Reg tachy, S1+S2, no m/r/g  PULM: CTA b/l, no w/r/r  ABD: Soft, NT, ND, bowel sounds " present  EXT: mild bilateral +LE edema, mild hand edema,  no calf tenderness b/l  Neuro: Answers appropriately, follows commands ongoing sensation impairment in all 4 extremities. Strength antigravity with resistance     LABS  Lab Results   Component Value Date    WBC 6.1 06/10/2020     Lab Results   Component Value Date    RBC 2.65 06/10/2020     Lab Results   Component Value Date    HGB 9.1 06/10/2020     Lab Results   Component Value Date    HCT 29.7 06/10/2020     Lab Results   Component Value Date     06/10/2020     Lab Results   Component Value Date    MCH 34.3 06/10/2020     Lab Results   Component Value Date    MCHC 30.6 06/10/2020     Lab Results   Component Value Date    RDW 19.8 06/10/2020     Lab Results   Component Value Date     06/10/2020       Last Comprehensive Metabolic Panel:  Sodium   Date Value Ref Range Status   06/10/2020 139 133 - 144 mmol/L Final     Potassium   Date Value Ref Range Status   06/10/2020 4.1 3.4 - 5.3 mmol/L Final     Chloride   Date Value Ref Range Status   06/10/2020 108 94 - 109 mmol/L Final     Carbon Dioxide   Date Value Ref Range Status   06/10/2020 28 20 - 32 mmol/L Final     Anion Gap   Date Value Ref Range Status   06/10/2020 3 3 - 14 mmol/L Final     Glucose   Date Value Ref Range Status   06/10/2020 103 (H) 70 - 99 mg/dL Final     Urea Nitrogen   Date Value Ref Range Status   06/10/2020 15 7 - 30 mg/dL Final     Creatinine   Date Value Ref Range Status   06/10/2020 0.65 0.52 - 1.04 mg/dL Final     GFR Estimate   Date Value Ref Range Status   06/10/2020 >90 >60 mL/min/[1.73_m2] Final     Comment:     Non  GFR Calc  Starting 12/18/2018, serum creatinine based estimated GFR (eGFR) will be   calculated using the Chronic Kidney Disease Epidemiology Collaboration   (CKD-EPI) equation.       Calcium   Date Value Ref Range Status   06/10/2020 9.0 8.5 - 10.1 mg/dL Final         IMPRESSION/PLAN:  Hilaria Bonner is a 29 year old woman with past  medical history of PE with associated PEA arrest, morbid obesity s/p sleeve gastrectomy, pancreatitis, and recent COVID 19 who presented 5/29/20 with 4 week history of paresthesias, RUE tremor and slowed speech admitted and underwent extensive workup findings of which make vitamin deficiency highest on current differential. She was admitted to acute rehab on 6/5/20  for ongoing rehabilitation and medical management.         Admission to acute inpatient rehab polyneuropathy suspected 2/2 nutritional deficiency  Impairment group code: 03.3        1. PT, OT 90 minutes of each on a daily basis, in addition to rehab nursing and close management of physiatrist.       2. Impairment of ADL's: Noted to have impaired sensation, activity tolerance, and strength goals for MOD I to I with basic ADLS.      3. Impairment of mobility:  Noted to have impaired sensation, activity tolerance, and strength goals for MOD I to I with basic mobility.         4. 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 Underwent extensive workup per neurology positive findings:  ESR and CRP elevated, ANH weakly positive.Folate remarkably low at 1.4. LP and Brain/Cervical MRI neg.  Highest suspicion at this point is for vitamin deficiency given h/o bariatric surgery, noncompliance with folate supplementation, and recent poor PO intake related to acute illness. Differential includes post-infectious autoimmune neuropathy in the setting of recent COVID-19 infection and Rheumatologic etiology.   -continue thiamine 100 mg daily,  folic acid 1 mg PO daily,  Citracal 1 tablet PO daily, multivitamin, vitamin C 1,000 mg daily & vitamin D3 1,000 units daily  Pain: continue pregabalin to 150 mg TID (increased 6/4) Amitriptyline to 50 mg qHS dose increased 6/7   gabapentin topical cream, tramadol 50 mg TID prn, tylenol  prn, tizanidine 4 mg at bedtime prn  - PT/OT as outpatient upon discharge.   - Follow up with neurology as scheduled.      #Macrocytic anemia  Hgb. 8.3 6/8.-->9.1 6/10.  No evidence of active bleeding. MCV elevated to 116 consistent with megaloblastic anemia likely 2/2 known h/o vitamin B12 deficiency (currently wnl at 502 after recent injection on 5/20). Iron studies notable for high iron, low iron binding capacity, and elevated ferritin likely reflect iron supplementation.  -trend.   -continue supplementation as above,recommended hold on iron supplementation      #Chronic chest pain  #Persistent tachycardia  #H/o cardiac arrest 2/2 PE  Present since cardiac arrest in 5/2019. Takes Tylenol at home which provides inconsistent relief. EKG with sinus tach 6/10. Troponin and D-dimer negative on admit and trop neg 6/10. BNP wnl, bedside ultrasound unremarkable.- CT coronary angiogram completed 6/1, unremarkable. Complained of chest tightness EKG, trop, bmp, cbc stable.   -f/u pcp     #H/o PE  Takes Xarelto PTA.  CT negative for PE on 5/30. PTA Xarelto held from admission until lumbar puncture on 6/2.   -continue Xarelto 20 mg daily    #HTN- BP elevated 130s-150s consistently.  -started lisinopril 10 mg daily 6/9   -f/u pcp        #Constipation, improved  #Hemorrhoids  Multiple stools streaked with bright red blood. Hemorrhoids noted per nursing exam.   - Miralax daily prn, senna-docusate BID prn  - Preparation H BID prn for hemorrhoids     #H/o COVID-19 infection (tested positive on 4/18 or 4/19)   COVID-19 negative on admission to hospital.        5. Adjustment to disability:  Monitor mood  6. FEN: reg  7. Bowel: continent  8. Bladder: continent  9. DVT Prophylaxis: ambulation  10. GI Prophylaxis: reg diet.   11. Code: full  12. Disposition: home  13. ELOS:  6/11  14. Rehab prognosis:  fair  15. Follow up Appointments on Discharge: pcp, neurology            Mimi Nuno PA-C  PM&R    I spent a total of 40 minutes  face-to-face or managing the care of Hilaria Bonner. Over 50% of my time on the unit was spent counseling the patient and coordinating care. See note for details.

## 2020-06-10 NOTE — DISCHARGE SUMMARY
[facility]   Acute Rehabilitation Unit  Discharge summary     Date of Admission: 6/5/2020  Date of Discharge: 6/11/20  Disposition: home  Primary Care Physician: Crissy Madrigal  Attending physician: Ingrid Bingham MD  Other significant physician provider(s): Dr. 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 She was admitted to acute rehab on 6/5/20  for ongoing rehabilitation and medical management.     rehabilitation course  ADLs/IADLs: Pt progressing with ADLs. Pt is Mod IND transfers and short distance mobility using fww. Pt is IND UB dressing, Mod IND LB dressing, IND standing grooming, and Mod IND toileting. Pt requiring CGA shower transfer and supervision bathing. Pt requiring supervision with ambulatory IADLs using a fww. Performance limited by BUE/BLE sensory deficits causing impaired coordination and balance. Working on strategies to improve IND and safety with functional activity. Recommend support with heavier IADLs. Recommend OP OT follow up. DME/AE: extended shower bench, fww, built up handles for grooming items and utensils.     Mobility: Mod I in room w/ ww. Continue outpatient PT  mEDICAL COURSE  Small-fiber sensory neuropathy, suspected 2/2 nutritional deficiency  Painful paresthesias in bilateral upper and lower extremities, distal>proximal, with preserved strength. Underwent extensive workup per neurology positive  findings:  ESR and CRP elevated, ANH weakly positive.Folate remarkably low at 1.4. LP and Brain/Cervical MRI neg.  Highest suspicion at this point is for vitamin deficiency given h/o bariatric surgery, noncompliance with folate supplementation, and recent poor PO intake related to acute illness. Differential includes post-infectious autoimmune neuropathy in the setting of recent COVID-19 infection and Rheumatologic etiology.   -continue thiamine 100 mg daily,  folic acid 1 mg PO daily,  Citracal 1 tablet PO daily, multivitamin, vitamin C 1,000 mg daily & vitamin D3 1,000 units daily  Pain: continue pregabalin to 150 mg TID (increased 6/4) Amitriptyline to 50 mg qHS dose increased 6/7   gabapentin topical cream, tramadol 50 mg TID prn, tylenol prn, tizanidine 4 mg at bedtime prn  - PT/OT as outpatient upon discharge.   - Follow up with neurology as scheduled.      #Macrocytic anemia  Hgb. 8.3 6/8.-->9.1 6/10.  No evidence of active bleeding. MCV elevated to 116 consistent with megaloblastic anemia likely 2/2 known h/o vitamin B12 deficiency (currently wnl at 502 after recent injection on 5/20). Iron studies notable for high iron, low iron binding capacity, and elevated ferritin likely reflect iron supplementation.  -trend.   -continue supplementation as above,recommended hold on iron supplementation      #Chronic chest pain  #Persistent tachycardia  #H/o cardiac arrest 2/2 PE  Present since cardiac arrest in 5/2019. Takes Tylenol at home which provides inconsistent relief. EKG with sinus tach 6/10. Troponin and D-dimer negative on admit and trop neg 6/10. BNP wnl, bedside ultrasound unremarkable.- CT coronary angiogram completed 6/1, unremarkable. Complained of chest tightness EKG, trop, bmp, cbc stable.   -f/u pcp     #H/o PE  Takes Xarelto PTA.  CT negative for PE on 5/30. PTA Xarelto held from admission until lumbar puncture on 6/2.   -continue Xarelto 20 mg daily     #HTN- BP elevated 130s-150s  consistently.  -started lisinopril 10 mg daily 6/9   -f/u pcp        #Constipation, improved  #Hemorrhoids  Multiple stools streaked with bright red blood. Hemorrhoids noted per nursing exam.   - Miralax daily prn, senna-docusate BID prn  - Preparation H BID prn for hemorrhoids     #H/o COVID-19 infection (tested positive on 4/18 or 4/19)   COVID-19 negative on admission to hospital.  dISCHARGE MEDICATIONS  Current Discharge Medication List      START taking these medications    Details   acetaminophen (TYLENOL) 325 MG tablet Take 2 tablets (650 mg) by mouth every 6 hours as needed for mild pain or fever  Qty:      Associated Diagnoses: Polyneuropathy      calcium citrate-vitamin D (CITRACAL) 315-250 MG-UNIT TABS per tablet Take 1 tablet by mouth 2 times daily  Qty: 60 tablet, Refills: 0    Associated Diagnoses: Vitamin D deficiency      lidocaine (XYLOCAINE) 4 % external solution Apply 10 mLs topically 3 times daily as needed for moderate pain  Qty:      Associated Diagnoses: Polyneuropathy      lisinopril (ZESTRIL) 10 MG tablet Take 1 tablet (10 mg) by mouth daily  Qty: 30 tablet, Refills: 0    Associated Diagnoses: Benign essential hypertension      traZODone (DESYREL) 50 MG tablet Take 0.5-1 tablets (25-50 mg) by mouth nightly as needed for sleep (give before midnight)  Qty: 30 tablet, Refills: 0    Associated Diagnoses: Anxiety         CONTINUE these medications which have CHANGED    Details   amitriptyline (ELAVIL) 50 MG tablet Take 1 tablet (50 mg) by mouth At Bedtime  Qty: 30 tablet, Refills: 0    Associated Diagnoses: Polyneuropathy      folic acid (FOLVITE) 1 MG tablet Take 1 tablet (1 mg) by mouth daily  Qty: 30 tablet, Refills: 0    Associated Diagnoses: Polyneuropathy      gabapentin 8 % in PLO cream Apply 4 clicks (1 g) topically every 8 hours  Qty: 100 g, Refills: 0    Comments: Dispense in dosing applicator. 1 click = 0.25g of product.  Associated Diagnoses: Polyneuropathy      hydrOXYzine (ATARAX)  25 MG tablet Take 1-2 tablets (25-50 mg) by mouth every 6 hours as needed for other (adjuvant pain)  Qty: 150 tablet, Refills: 0    Associated Diagnoses: Paresthesias      Multiple Vitamins-Minerals (MULTIVITAMIN ADULTS) TABS Take 1 tablet by mouth daily    Associated Diagnoses: Polyneuropathy      polyethylene glycol (MIRALAX) 17 GM/SCOOP powder Take 17 g (1 capful) by mouth daily  Qty: 850 g, Refills: 3    Associated Diagnoses: Constipation, unspecified constipation type      pregabalin (LYRICA) 150 MG capsule Take 1 capsule (150 mg) by mouth 3 times daily  Qty: 90 capsule, Refills: 0    Associated Diagnoses: Paresthesias      tiZANidine (ZANAFLEX) 4 MG tablet Take 1 tablet (4 mg) by mouth nightly as needed for muscle spasms  Qty: 20 tablet, Refills: 0    Associated Diagnoses: Acute bilateral thoracic back pain; Cervicalgia      traMADol (ULTRAM) 50 MG tablet Take 1 tablet (50 mg) by mouth every 6 hours as needed for moderate pain  Qty: 15 tablet, Refills: 0    Associated Diagnoses: Paresthesias      vitamin B1 (THIAMINE) 100 MG tablet Take 1 tablet (100 mg) by mouth daily  Qty: 30 tablet, Refills: 0    Associated Diagnoses: Paresthesias      vitamin C (ASCORBIC ACID) 1000 MG TABS Take 1 tablet (1,000 mg) by mouth daily  Qty: 30 tablet, Refills: 0    Associated Diagnoses: Polyneuropathy      vitamin D3 (CHOLECALCIFEROL) 2000 units (50 mcg) tablet Take 1 tablet (2,000 Units) by mouth daily  Qty: 30 tablet, Refills: 0    Associated Diagnoses: S/P laparoscopic sleeve gastrectomy         CONTINUE these medications which have NOT CHANGED    Details   melatonin 1 MG TABS tablet Take 1 tablet (1 mg) by mouth nightly as needed for sleep  Qty:      Associated Diagnoses: Paresthesias      phenylephrine-shark liver oil-mineral oil-petrolatum (PREPARATION H) 0.25-14-74.9 % rectal ointment Place rectally 2 times daily as needed for hemorrhoids  Qty:      Associated Diagnoses: Hemorrhoids, unspecified hemorrhoid type       rivaroxaban ANTICOAGULANT (XARELTO ANTICOAGULANT) 20 MG TABS tablet Take 1 tablet (20 mg) by mouth daily (with dinner)  Qty: 90 tablet, Refills: 3    Associated Diagnoses: Pulmonary embolism with infarction (H)         STOP taking these medications       acetaminophen-codeine (TYLENOL #3) 300-30 MG tablet Comments:   Reason for Stopping:         Calcium Citrate-Vitamin D 500-500 MG-UNIT CHEW Comments:   Reason for Stopping:         CHANTIX STARTING MONTH AMADO 0.5 MG X 11 & 1 MG X 42 tablet Comments:   Reason for Stopping:         fluticasone (FLONASE) 50 MCG/ACT nasal spray Comments:   Reason for Stopping:         gabapentin (NEURONTIN) 300 MG capsule Comments:   Reason for Stopping:         magnesium hydroxide (MILK OF MAGNESIA) 400 MG/5ML suspension Comments:   Reason for Stopping:         nicotine (NICORETTE) 4 MG lozenge Comments:   Reason for Stopping:         omeprazole (PRILOSEC) 20 MG DR capsule Comments:   Reason for Stopping:         potassium chloride ER (KLOR-CON M) 10 MEQ CR tablet Comments:   Reason for Stopping:         topiramate (TOPAMAX) 25 MG tablet Comments:   Reason for Stopping:         Vitamin D, Cholecalciferol, 25 MCG (1000 UT) CAPS Comments:   Reason for Stopping:         vitamin D3 (CHOLECALCIFEROL) 52494 units (250 mcg) capsule Comments:   Reason for Stopping:                 DISCHARGE INSTRUCTIONS AND FOLLOW UP  Discharge Procedure Orders   Physical Therapy Referral   Standing Status: Future   Referral Priority: Routine Referral Type: Rehab Therapy Physical Therapy   Number of Visits Requested: 1     Occupational Therapy Referral   Standing Status: Future   Referral Priority: Routine Referral Type: Occupational Therapy   Number of Visits Requested: 1     Reason for your hospital stay   Order Comments: Admitted for rehabilitation following hospital stay for acute polyneuropathy     Adult [facility] Follow-up and recommended labs and tests   Order Comments: Follow up with primary care  provider, Crissy Madrigal, within 7 days for hospital follow- up  Follow up with neurology within one month.     Appointments on [place] and/or [facility] (with [facility] or [facility] provider or service). Call [phone] if you haven't heard regarding these appointments within 7 days of discharge.     Activity   Order Comments: Your activity upon discharge: activity as tolerated     Order Specific Question Answer Comments   Is discharge order? Yes      Full Code     Order Specific Question Answer Comments   Code status determined by: Discussion with patient/legal decision maker      Diet   Order Comments: Follow this diet upon discharge:  Regular Diet Adult     Order Specific Question Answer Comments   Is discharge order? Yes           physical examination    Most recent Vital Signs:   Vitals:    06/09/20 1556 06/10/20 0805 06/10/20 1104 06/10/20 1654   BP: 134/87 (!) 158/96 130/53 131/84   BP Location: Left arm Right arm Right arm Right arm   Pulse: 121 116  122   Resp: 18 16  16   Temp: 97.5  F (36.4  C) 98.4  F (36.9  C)  96.4  F (35.8  C)   TempSrc: Oral Oral  Oral   SpO2: 99% 98%  99%   Weight:       Height:       General: awake alert nad  Resp: non labored clear on room air  Ab: soft non distended non tender  Extremities: warm dry with trace edema in hands and ble  CV: rrr  MSK/neuro: alert speech clear strength 5/5 except  4/5 due to pain, sensation impairment in all 4 extremities >distallay.       40 minutes spent in discharge, including >50% in counseling and coordination of care, medication review and plan of care recommended on follow up.     Discharge summary was forwarded to Crissy Madrigal (PCP) at the time of discharge, so as to bridge from hospital to outpatient care.     It was our pleasure to care for Hilaria Bonner during this hospitalization. Please do not hesitate to contact me should there be questions regarding the hospital course or discharge plan.          Mimi Nuno  PA-C  Physical Medicine and Rehabilitation   [phone]

## 2020-06-11 VITALS
TEMPERATURE: 98 F | SYSTOLIC BLOOD PRESSURE: 131 MMHG | RESPIRATION RATE: 12 BRPM | HEART RATE: 116 BPM | BODY MASS INDEX: 39.33 KG/M2 | DIASTOLIC BLOOD PRESSURE: 93 MMHG | WEIGHT: 250.6 LBS | HEIGHT: 67 IN | OXYGEN SATURATION: 97 %

## 2020-06-11 PROCEDURE — 25000132 ZZH RX MED GY IP 250 OP 250 PS 637: Performed by: PHYSICIAN ASSISTANT

## 2020-06-11 RX ORDER — LIDOCAINE HYDROCHLORIDE 40 MG/ML
10 SOLUTION TOPICAL 3 TIMES DAILY PRN
Qty: 150 ML | Refills: 0 | Status: SHIPPED | OUTPATIENT
Start: 2020-06-11 | End: 2020-06-19

## 2020-06-11 RX ORDER — LIDOCAINE HYDROCHLORIDE 40 MG/ML
10 SOLUTION TOPICAL 3 TIMES DAILY PRN
Qty: 150 ML | Refills: 0 | Status: SHIPPED | OUTPATIENT
Start: 2020-06-11 | End: 2020-06-11

## 2020-06-11 RX ADMIN — OXYCODONE HYDROCHLORIDE AND ACETAMINOPHEN 1000 MG: 500 TABLET ORAL at 08:23

## 2020-06-11 RX ADMIN — FOLIC ACID 1 MG: 1 TABLET ORAL at 08:24

## 2020-06-11 RX ADMIN — THIAMINE HCL TAB 100 MG 100 MG: 100 TAB at 08:24

## 2020-06-11 RX ADMIN — THERA TABS 1 TABLET: TAB at 08:24

## 2020-06-11 RX ADMIN — ACETAMINOPHEN 650 MG: 325 TABLET, FILM COATED ORAL at 06:04

## 2020-06-11 RX ADMIN — MELATONIN 1000 UNITS: at 08:24

## 2020-06-11 RX ADMIN — PREGABALIN 150 MG: 75 CAPSULE ORAL at 08:23

## 2020-06-11 RX ADMIN — TRAMADOL HYDROCHLORIDE 50 MG: 50 TABLET, FILM COATED ORAL at 06:04

## 2020-06-11 RX ADMIN — Medication 1 TABLET: at 08:24

## 2020-06-11 RX ADMIN — RIVAROXABAN 20 MG: 10 TABLET, FILM COATED ORAL at 08:24

## 2020-06-11 RX ADMIN — HYDROXYZINE HYDROCHLORIDE 50 MG: 25 TABLET ORAL at 08:23

## 2020-06-11 RX ADMIN — LIDOCAINE HYDROCHLORIDE 10 ML: 40 SOLUTION TOPICAL at 05:58

## 2020-06-11 RX ADMIN — Medication 1 G: at 08:25

## 2020-06-11 RX ADMIN — LISINOPRIL 10 MG: 10 TABLET ORAL at 08:24

## 2020-06-11 NOTE — PROGRESS NOTES
Prior Authorization **DENIED**    Medication: Pregabalin 150mg caps - Quantity Limits  Denial Date:    Reference #: CoverMyMeds Key: [id number] - PA Case ID: [id number]  Denial Rational:     Appeal Information: n/a  Denial:    Comments: Reject at pharmacy says plan limitations exceeded, but per [employer] PMAP #90 per 30 days is within plan limits. Appears to be bumping up against previous claim for #81 25mg capsules billed on 5/27/20. Per conversation with pharmacy help desk and prior authorization dept, this still does need a prior auth for plan limitations but original review had not taken into account fill of 25mg capsules. Discharge pharmacy sent patient with supply while auth is pending. Will retroactively bill once determination received.      Angela Reyez CPhT  [place] Discharge Pharmacy Liaison  Pronouns: She/Her/Hers    [facility]  [facility]  [address] [place] [address] [address], [place] [address]   [email or web address]  [email or web address]   Phone: [phone]  Pager: [phone]  Fax: [phone]

## 2020-06-11 NOTE — PROGRESS NOTES
Prior Authorization **INITIATED**    Medication: Pregabalin 150mg caps - Quantity Limits  Insurance Company: [employer] - Phone [phone] Fax [phone]  Pharmacy Filling the Rx: [facility] - [place], [place] - [address]  Filling Pharmacy Phone: [phone]  Filling Pharmacy Fax: [phone]  Start Date: 6/11/2020  Reference #: *NEW* PA Case ID: [id number]  Comments: Reject at pharmacy says plan limitations exceeded, but per [facility] PMAP #90 per 30 days is within plan limits. Appears to be bumping up against previous claim for #81 25mg capsules billed on 5/27/20. Per conversation with pharmacy help desk and prior authorization dept, this still does need a prior auth for plan limitations but original review had not taken into account fill of 25mg capsules. Urgent PA resubmitted. Discharge pharmacy sent patient with supply while auth is pending. Will retroactively bill once determination received.      Angela Reyez CPhT  [place] Discharge Pharmacy Liaison  Pronouns: She/Her/Hers    [facility]  [address]  [address], [place] [address]   [email or web address]  [email or web address]   Phone: [phone]  Pager: [phone]  Fax: [phone]

## 2020-06-11 NOTE — PLAN OF CARE
FOCUS/GOAL  Medical management    ASSESSMENT, INTERVENTIONS AND CONTINUING PLAN FOR GOAL:  Alert & oriented, uses call light to make needs known. MOD I in room with walker. Continent of B&B, LBM 6/9. Appetite 100% for breakfast. Discharge instructions reviewed with patients with all questions answered. All belongings including prescribed medications. Pt was able to transfer to bed independently.

## 2020-06-11 NOTE — PLAN OF CARE
Alert and oriented X 4. Able to make needs known. Call light in reach.  Bilateral hand, leg and foot  pain managed with Tylenol and Tramadol. Mod I in room with walker. Continent of bowel and bladder. Independent with ADLs. Sleeping off and on during night  Anticipate discharge today.

## 2020-06-12 ENCOUNTER — PATIENT OUTREACH (OUTPATIENT)
Dept: CARE COORDINATION | Facility: CLINIC | Age: 30
End: 2020-06-12

## 2020-06-12 LAB — INTERPRETATION ECG - MUSE: NORMAL

## 2020-06-12 ASSESSMENT — ACTIVITIES OF DAILY LIVING (ADL): DEPENDENT_IADLS:: INDEPENDENT

## 2020-06-12 NOTE — LETTER
[facility]  Complex Care Plan  About Me:    Patient Name:  Hilaria Bonner    YOB: 1990  Age:         29 year old   [place] MRN:    [id number] Telephone Information:  Home Phone [phone]   Mobile [phone]       Address:  [address]  [address]  [place] [address] Email address:  [email or web address]      Emergency Contact(s)    Name Relationship Lgl Grd Work Phone Home Phone Mobile Phone   1. MANJULA BONNER Mother   [phone]   2. DECLINED, PER * Declined               Primary language:  English     needed? No   [facility] [employer] [facility]:  [phone] op. 1  Other communication barriers: None  Preferred Method of Communication:  Ferdinand  Current living arrangement: I live in a private home with family  Mobility Status/ Medical Equipment: Independent w/Device    Health Maintenance  Health Maintenance Reviewed: Due/Overdue   Health Maintenance Due   Topic Date Due     HIV SCREENING  12/24/2005     EYE EXAM  01/04/2019     PREVENTIVE CARE VISIT  01/12/2019       My Access Plan  Medical Emergency 911   Primary Clinic Line [facility] - [phone]   24 Hour Appointment Line [phone] or  [phone]) (toll-free)   24 Hour Nurse Line [phone] (toll-free)   Preferred Urgent Care     Preferred Hospital [facility]  [phone]   Preferred Pharmacy [id number] - [place], [place] - [address] AT SEC OF [facility]     Behavioral Health Crisis Line The National Suicide Prevention Lifeline at 1-400.232.6536 or 911             My Care Team Members  Patient Care Team       Relationship Specialty Notifications Start End    Crissy Madrigal PA-C PCP - General Family Practice  10/10/19     Phone: [phone] Fax: [phone] [address] [facility] [address]    Joanne Sterling PA-C Physician  Assistant Physician Assistant  1/16/18     Phone: [phone] Fax: [phone]         [address]  [facility] [address]    Terri Cody PA-C Physician Assistant Physician Assistant  1/16/18     Phone: [phone] Fax: [phone]         [address] [facility] [address]    Crissy Madrigal PA-C Assigned PCP   6/9/19     Phone: [phone] Fax: [phone]         [address] [facility] [address]    Monica Hernandez, RN Lead Care Coordinator Primary Care - [facility] Admissions 6/12/20     Phone: [phone]                 My Care Plans  Self Management and Treatment Plan  Goals and (Comments)  Goals        General    Improve chronic symptoms (pt-stated)     Notes - Note created  6/12/2020 10:48 AM by Monica Hernandez RN    Goal Statement: I want the burning pain in my hands to improve    Date Goal set: 6/12/20    Barriers: Polyneuropathy    Strengths: Family support    Date to Achieve By: October 2020    Patient expressed understanding of goal: yes    Action steps to achieve this goal:  1. I will continue Lyrica TID  2. I will use Tramadol as instructed for pain  3. I will participate in outpatient therapies              Action Plans on File:                       Advance Care Plans/Directives Type:        My Medical and Care Information  Problem List   Patient Active Problem List   Diagnosis     Morbid obesity (H)     Vitamin D deficiency     Elevated parathyroid hormone     Encounter for smoking cessation counseling     Menorrhagia with irregular cycle     Morbid (severe) obesity due to excess calories (H)     Pulmonary embolism with infarction (H)     Cardiac arrest, cause unspecified (H)     VT (ventricular tachycardia) (H)     Other pulmonary embolism with acute cor pulmonale, unspecified chronicity (H)     Secondary hyperparathyroidism, not elsewhere classified (H)     Paresthesias     Hemorrhoids, unspecified hemorrhoid type     Anxiety     Polyneuropathy      Current  Medications and Allergies:  See printed Medication Report.    Care Coordination Start Date: 6/12/2020   Frequency of Care Coordination: monthly   Form Last Updated: 06/12/2020
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
## 2020-06-12 NOTE — PROGRESS NOTES
Prior Authorization **DENIED**    Medication: Pregabalin 150mg caps *DENIED*  Denial Date: 6/11/2020  Reference #: *NEW* PA Case ID: [id number]  Denial Rational:     Appeal Information:     Comments: Reject at pharmacy says plan limitations exceeded, but per [employer] PMAP #90 per 30 days is within plan limits. Appears to be bumping up against previous claim for #81 25mg capsules billed on 5/27/20. Per conversation with pharmacy help desk and prior authorization dept, this still does need a prior auth for plan limitations but original review had not taken into account fill of 25mg capsules. Urgent PA resubmitted. Discharge pharmacy sent patient with supply while auth is pending. Will retroactively bill once determination received. **WILL PURSUE APPEAL**  Denial Fax:              Angela Reyez CPhT  [place] [facility] Liaison  Pronouns: She/Her/Hers    [facility]  [address] [place] [address]  [address] [place] [address] [address], [place] [address]   [email or web address]   Phone: [phone]  Pager: [phone]  Fax: [phone]

## 2020-06-12 NOTE — LETTER
[place] CARE COORDINATION  [address]  [place] [address]    June 12, 2020    Hilaria Bonner  [address]  [address]  [place] [address]      Dear Hilaria,    I am a clinic care coordinator who works with Crissy Madrigal PA-C at [facility]. I wanted to thank you for spending the time to talk with me.  Below is a description of clinic care coordination and how I can further assist you.      The clinic care coordination team is made up of a registered nurse,  and community health worker who understand the health care system. The goal of clinic care coordination is to help you manage your health and improve access to the health care system in the most efficient manner. The team can assist you in meeting your health care goals by providing education, coordinating services, strengthening the communication among your providers and supporting you with any resource needs.    Please feel free to contact me at [phone] with any questions or concerns. We are focused on providing you with the highest-quality healthcare experience possible and that all starts with you.     Sincerely,     Monica Hernandez RN, [facility] - Primary Care Clinic RN Coordinator  [facility]     [phone]

## 2020-06-18 ENCOUNTER — PATIENT OUTREACH (OUTPATIENT)
Dept: CARE COORDINATION | Facility: CLINIC | Age: 30
End: 2020-06-18

## 2020-06-18 NOTE — PROGRESS NOTES
"Clinic Care Coordination Contact    Follow Up Progress Note      Assessment: Patient calling clinic care coordinator.     Patient states she is having trouble with getting messages on her phone. They are garbled. She states she therapy has tried to call her but she can not understand them. Confirmed the number patient has is correct and instructed her to call and scheduled PT and OT appointments. She states these may have to wait a week or so, as she states her aunt  and she needs to travel to [place] for the .     Reminded patient she missed the neurology appointment. She states they called the wrong number, they called the home number and she was not there. Need to always call her cell number. She states she has rescheduled for a call with neurology for tomorrow (20) morning.    Patient states she continues to have ongoing burning pain in her hands and now she is having involuntary \"movements of her fingers\"  She states all of this started after she had covid-19. She states she has been taking her supplements from gastric bypass surgery so she knows it is not from low vitamin or minerals.  In fact, her iron was too high and she was instructed to stop the multivitamin.         Goals addressed this encounter:   Goals Addressed                 This Visit's Progress      Improve chronic symptoms (pt-stated)   0%     Goal Statement: I want the burning pain in my hands to improve    Date Goal set: 20    Barriers: Polyneuropathy    Strengths: Family support    Date to Achieve By: 2020    Patient expressed understanding of goal: yes    Action steps to achieve this goal:  1. I will continue Lyrica TID  2. I will use Tramadol as instructed for pain  3. I will participate in outpatient therapies         Intervention/Education provided during outreach: Provided patient with therapy numbers.      Outreach Frequency: monthly    Plan:   Patient will follow up with neurology as scheduled.  Patient will " make therapy appointments and follow up PCP appointment for after she returns from [place].    Care Coordinator will follow up in one month.    Monica Hernandez RN, [facility] - Primary Care Clinic RN Coordinator  [facility]   6/18/2020    1:09 PM  [phone]

## 2020-06-19 ENCOUNTER — VIRTUAL VISIT (OUTPATIENT)
Dept: FAMILY MEDICINE | Facility: CLINIC | Age: 30
End: 2020-06-19
Payer: COMMERCIAL

## 2020-06-19 ENCOUNTER — VIRTUAL VISIT (OUTPATIENT)
Dept: NEUROLOGY | Facility: CLINIC | Age: 30
End: 2020-06-19
Payer: COMMERCIAL

## 2020-06-19 VITALS — BODY MASS INDEX: 39.33 KG/M2 | HEIGHT: 67 IN | WEIGHT: 250.6 LBS

## 2020-06-19 DIAGNOSIS — I10 BENIGN ESSENTIAL HYPERTENSION: ICD-10-CM

## 2020-06-19 DIAGNOSIS — G62.9 POLYNEUROPATHY: Primary | ICD-10-CM

## 2020-06-19 DIAGNOSIS — R47.9 SPEECH DISTURBANCE, UNSPECIFIED TYPE: ICD-10-CM

## 2020-06-19 DIAGNOSIS — G62.9 POLYNEUROPATHY: ICD-10-CM

## 2020-06-19 DIAGNOSIS — R20.2 PARESTHESIAS: ICD-10-CM

## 2020-06-19 DIAGNOSIS — R26.2 DIFFICULTY WALKING: ICD-10-CM

## 2020-06-19 DIAGNOSIS — M62.81 GENERALIZED MUSCLE WEAKNESS: ICD-10-CM

## 2020-06-19 PROCEDURE — 99214 OFFICE O/P EST MOD 30 MIN: CPT | Mod: 95 | Performed by: PHYSICIAN ASSISTANT

## 2020-06-19 RX ORDER — FOLIC ACID 1 MG/1
1 TABLET ORAL DAILY
Qty: 30 TABLET | Refills: 11 | Status: SHIPPED | OUTPATIENT
Start: 2020-06-19 | End: 2021-06-18

## 2020-06-19 RX ORDER — LIDOCAINE HYDROCHLORIDE 40 MG/ML
10 SOLUTION TOPICAL 3 TIMES DAILY PRN
Qty: 150 ML | Refills: 3 | Status: SHIPPED | OUTPATIENT
Start: 2020-06-19 | End: 2020-07-09

## 2020-06-19 RX ORDER — AMITRIPTYLINE HYDROCHLORIDE 50 MG/1
75 TABLET ORAL AT BEDTIME
Qty: 45 TABLET | Refills: 3 | Status: SHIPPED | OUTPATIENT
Start: 2020-06-19 | End: 2020-07-02

## 2020-06-19 RX ORDER — PREGABALIN 150 MG/1
150 CAPSULE ORAL 3 TIMES DAILY
Qty: 90 CAPSULE | Refills: 3 | Status: SHIPPED | OUTPATIENT
Start: 2020-06-19 | End: 2020-08-20 | Stop reason: DRUGHIGH

## 2020-06-19 RX ORDER — TRAMADOL HYDROCHLORIDE 50 MG/1
50 TABLET ORAL EVERY 6 HOURS PRN
Qty: 15 TABLET | Refills: 0 | Status: SHIPPED | OUTPATIENT
Start: 2020-06-19 | End: 2020-06-25

## 2020-06-19 ASSESSMENT — ANXIETY QUESTIONNAIRES
GAD7 TOTAL SCORE: 21
3. WORRYING TOO MUCH ABOUT DIFFERENT THINGS: NEARLY EVERY DAY
7. FEELING AFRAID AS IF SOMETHING AWFUL MIGHT HAPPEN: NEARLY EVERY DAY
6. BECOMING EASILY ANNOYED OR IRRITABLE: NEARLY EVERY DAY
4. TROUBLE RELAXING: NEARLY EVERY DAY
2. NOT BEING ABLE TO STOP OR CONTROL WORRYING: NEARLY EVERY DAY
IF YOU CHECKED OFF ANY PROBLEMS ON THIS QUESTIONNAIRE, HOW DIFFICULT HAVE THESE PROBLEMS MADE IT FOR YOU TO DO YOUR WORK, TAKE CARE OF THINGS AT HOME, OR GET ALONG WITH OTHER PEOPLE: VERY DIFFICULT
1. FEELING NERVOUS, ANXIOUS, OR ON EDGE: NEARLY EVERY DAY
5. BEING SO RESTLESS THAT IT IS HARD TO SIT STILL: NEARLY EVERY DAY

## 2020-06-19 ASSESSMENT — PATIENT HEALTH QUESTIONNAIRE - PHQ9: SUM OF ALL RESPONSES TO PHQ QUESTIONS 1-9: 23

## 2020-06-19 ASSESSMENT — MIFFLIN-ST. JEOR: SCORE: 1898.3

## 2020-06-19 ASSESSMENT — PAIN SCALES - GENERAL: PAINLEVEL: WORST PAIN (10)

## 2020-06-19 NOTE — LETTER
June 19, 2020      Hilaria Bonner  [address]  [address]  [place] [address]        To Whom It May Concern,       Please excuse Hilaria from work due to illness until further notice.  She is being followed by neurology and we believe her symptoms are secondary to the covid infection that she contracted from work but we cannot prove that.      Sincerely,        Crissy Madrigal PA-C

## 2020-06-19 NOTE — PROGRESS NOTES
"Hilaria Bonner is a 29 year old female who is being evaluated via a billable video visit.      The patient has been notified of following:     \"This video visit will be conducted via a call between you and your physician/provider. We have found that certain health care needs can be provided without the need for an in-person physical exam.  This service lets us provide the care you need with a video conversation.  If a prescription is necessary we can send it directly to your pharmacy.  If lab work is needed we can place an order for that and you can then stop by our lab to have the test done at a later time.    Video visits are billed at different rates depending on your insurance coverage.  Please reach out to your insurance provider with any questions.    If during the course of the call the physician/provider feels a video visit is not appropriate, you will not be charged for this service.\"    Patient has given verbal consent for Video visit? Yes    Will anyone else be joining your video visit? No        Video-Visit Details    Type of service:  Video Visit    Video Start Time: 10:32  Video End Time: 11:11    Originating Location (pt. Location): Home    Distant Location (provider location):  [facility] NEUROLOGY     Platform used for Video Visit: Aura Ascencio, EMT        "

## 2020-06-19 NOTE — LETTER
"6/19/2020       RE: Hilaria Bonner  [address]  [address]  [place] [address]     Dear Colleague,    Thank you for referring your patient, Hilaria Bonner, to the [facility] NEUROLOGY at [facility]. Please see a copy of my visit note below.    Hilaria Bonner is a 29 year old female who is being evaluated via a billable video visit.      The patient has been notified of following:     \"This video visit will be conducted via a call between you and your physician/provider. We have found that certain health care needs can be provided without the need for an in-person physical exam.  This service lets us provide the care you need with a video conversation.  If a prescription is necessary we can send it directly to your pharmacy.  If lab work is needed we can place an order for that and you can then stop by our lab to have the test done at a later time.    Video visits are billed at different rates depending on your insurance coverage.  Please reach out to your insurance provider with any questions.    If during the course of the call the physician/provider feels a video visit is not appropriate, you will not be charged for this service.\"    Patient has given verbal consent for Video visit? Yes    Will anyone else be joining your video visit? No        Video-Visit Details    Type of service:  Video Visit    Video Start Time: 10:32  Video End Time: 11:11    Originating Location (pt. Location): Home    Distant Location (provider location):  [facility] NEUROLOGY     Platform used for Video Visit: KERVIN Jaeger          Service Date: 06/19/2020      VIDEO FOLLOWUP VISIT      This is a video followup visit.  This is being done virtually because of COVID-19.      HISTORY OF PRESENT ILLNESS:  Hilaria Bonner returns for followup to discuss her recent hospitalization.      She is a patient I originally did a video visit on 05/28/2020.  She did develop and did test " positive for COVID-19  about a month earlier.  Three to four weeks prior to her evaluation by me she began experiencing painful paresthesias and numbness in her hands, face and lower extremities.  She subsequently started experiencing weakness and slurred speech.  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  She was admitted to the Neurology Service at the [facility] from 05/29 through 06/05/2020.  She had extensive testing done at that time.  She had head and cervical MRI scans, which were normal.  She had spinal fluid analysis that revealed no white cells and normal protein of 29.  Laboratory work included a positive ANH of 1:160 but a negative double stranded DNA, ANCA, Sjogren antibodies, C-reactive protein and rheumatoid factor.  Sedimentation rate was 33.      It is notable the patient has a history of gastric surgery for weight loss.  She does get B12 and injections and had a normal B12 level, but she did have a low thiamin level of 45 and a low folate level of 1.4.  She was started on thiamine and folate supplementation.  She had a normal vitamin E level, normal B6 level.      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      She did spend some time on Rehabilitation Service and was discharged home.  Discharge medications include amitriptyline 50 mg, Lyrica 150 mg 3 times a day, folic acid 1 mg, hydroxyzine for anxiety, lidocaine topical, melatonin, multivitamin, Xarelto which she had previously been, tramadol, thiamine 100 mg, although she is currently taking over-the-counter thiamine that contains 250 mg, tizanidine, trazodone and gabapentin cream.      She has been unable to get insurance coverage for the gabapentin cream.      She tells me she is still having a lot of tingling, numbness and burning in her hands and her hands feel swollen.  Her lower extremities are feeling better.      IMPRESSION:  Polyneuritis.      PLAN:  The  patient and her mother have questions about how this could be a nutritional neuropathy in that she never had symptoms until recently.  They still wonder about this being a post-COVID-19 polyneuritis.  I did tell them that I have no way of proving this was post-COVID-19, although I do have my suspicions.  Regardless, she does need to be on the supplementation of thiamine and folic acid as well as her B12 shots.      We discussed her pain management.  She is still having a lot of pain in her hands.  I have suggested she increase her amitriptyline dose to 75 mg but advised her about signs and symptoms of toxicity including serotonin syndrome and she will be aware of that.  I suggested she stop trazodone and tizanidine.  She will continue on the Lyrica 150 mg 3 times a day and lidocaine ointment.      She still is having a lot of pain and I did give her a refill for tramadol #15 but advised her to be very judicious in the use of this agent.      I plan to continue to monitor her course and plan to see her back for a face-to-face visit in 3 weeks.      Total visit time was 39 minutes.  The majority of this was spent in counseling and coordination of care.  The platform utilized was [employer].  The patient was in her home and I was at the Neurology Clinic in the [facility].         MOMO WARREN MD             D: 2020   T: 2020   MT: julieth      Name:     RADHA JOHANSEN   MRN:      [id number]        Account:      [id number]   :      1990           Service Date: 2020      Document: [id number]        Again, thank you for allowing me to participate in the care of your patient.      Sincerely,    Momo Warren MD

## 2020-06-19 NOTE — PROGRESS NOTES
Service Date: 06/19/2020      VIDEO FOLLOWUP VISIT      This is a video followup visit.  This is being done virtually because of COVID-19.      HISTORY OF PRESENT ILLNESS:  Hilaria Bonner returns for followup to discuss her recent hospitalization.      She is a patient I originally did a video visit on 05/28/2020.  She did develop and did test positive for COVID-19  about a month earlier.  Three to four weeks prior to her evaluation by me she began experiencing painful paresthesias and numbness in her hands, face and lower extremities.  She subsequently started experiencing weakness and slurred speech.  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  She was admitted to the Neurology Service at the [facility] from 05/29 through 06/05/2020.  She had extensive testing done at that time.  She had head and cervical MRI scans, which were normal.  She had spinal fluid analysis that revealed no white cells and normal protein of 29.  Laboratory work included a positive ANH of 1:160 but a negative double stranded DNA, ANCA, Sjogren antibodies, C-reactive protein and rheumatoid factor.  Sedimentation rate was 33.      It is notable the patient has a history of gastric surgery for weight loss.  She does get B12 and injections and had a normal B12 level, but she did have a low thiamin level of 45 and a low folate level of 1.4.  She was started on thiamine and folate supplementation.  She had a normal vitamin E level, normal B6 level.      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      She did spend some time on Rehabilitation Service and was discharged home.  Discharge medications include amitriptyline 50 mg, Lyrica 150 mg 3 times a day, folic acid 1 mg, hydroxyzine for anxiety, lidocaine topical, melatonin, multivitamin, Xarelto which she had previously been, tramadol, thiamine 100 mg, although she is currently taking over-the-counter thiamine that  contains 250 mg, tizanidine, trazodone and gabapentin cream.      She has been unable to get insurance coverage for the gabapentin cream.      She tells me she is still having a lot of tingling, numbness and burning in her hands and her hands feel swollen.  Her lower extremities are feeling better.      IMPRESSION:  Polyneuritis.      PLAN:  The patient and her mother have questions about how this could be a nutritional neuropathy in that she never had symptoms until recently.  They still wonder about this being a post-COVID-19 polyneuritis.  I did tell them that I have no way of proving this was post-COVID-19, although I do have my suspicions.  Regardless, she does need to be on the supplementation of thiamine and folic acid as well as her B12 shots.      We discussed her pain management.  She is still having a lot of pain in her hands.  I have suggested she increase her amitriptyline dose to 75 mg but advised her about signs and symptoms of toxicity including serotonin syndrome and she will be aware of that.  I suggested she stop trazodone and tizanidine.  She will continue on the Lyrica 150 mg 3 times a day and lidocaine ointment.      She still is having a lot of pain and I did give her a refill for tramadol #15 but advised her to be very judicious in the use of this agent.      I plan to continue to monitor her course and plan to see her back for a face-to-face visit in 3 weeks.      Total visit time was 39 minutes.  The majority of this was spent in counseling and coordination of care.  The platform utilized was [employer].  The patient was in her home and I was at the Neurology Clinic in the [facility].         MAKAYLA WARREN MD             D: 2020   T: 2020   MT: julieth      Name:     RADHA JOHANSEN   MRN:      [id number]        Account:      [id number]   :      1990           Service Date: 2020      Document: [id number]

## 2020-06-19 NOTE — PROGRESS NOTES
"Hilaria Bonner is a 29 year old female who is being evaluated via a billable telephone visit.      The patient has been notified of following:     \"This telephone visit will be conducted via a call between you and your physician/provider. We have found that certain health care needs can be provided without the need for a physical exam.  This service lets us provide the care you need with a short phone conversation.  If a prescription is necessary we can send it directly to your pharmacy.  If lab work is needed we can place an order for that and you can then stop by our lab to have the test done at a later time.    Telephone visits are billed at different rates depending on your insurance coverage. During this emergency period, for some insurers they may be billed the same as an in-person visit.  Please reach out to your insurance provider with any questions.    If during the course of the call the physician/provider feels a telephone visit is not appropriate, you will not be charged for this service.\"    Patient has given verbal consent for Telephone visit?  Yes    What phone number would you like to be contacted at? [phone]    How would you like to obtain your AVS? [facility]    Subjective     Hilaria Bonner is a 29 year old female who presents via phone visit today for the following health issues:    HPI    Hospital Follow-up Visit:     Hospital/Nursing Home/IP Rehab Facility:  [facility]   Date of Admission: 5/29/00  Date of Discharge: 6/5/2020  Reason(s) for Admission: chest pain covid symptoms       Was your hospitalization related to COVID-19? YES   How are you feeling today? Worse  In the past 24 hours have you had shortness of breath when speaking, walking, or climbing stairs? My breathing issues have worsened  Do you have a cough? I don't have a cough  When is the last time you had a fever greater than 100? Un able to get this   Are you having any other symptoms? " "Fatigue, Pain or pressure in chest, Body aches, Headaches, Confusion and Emotional distress   Do you have any other stressors you would like to discuss with your provider? OTHER: This is a workers comp issue per pt she needs form or letter to work due to  [phone]              Was the patient in the ICU or did the patient experience delirium during hospitalization?  No          Problems taking medications regularly:  No   Medication changes since discharge: yes   Problems adhering to non-medication therapy:  None    Summary of hospitalization:  [facility] discharge summary reviewed  Diagnostic Tests/Treatments reviewed.  Follow up needed: neurology, ongoing rehabilitation needed   Other Healthcare Providers Involved in Patient s Care:         Specialist appointment - neurologist   Update since discharge: stable.       Post Discharge Medication Reconciliation: discharge medications reconciled, continue medications without change.  Plan of care communicated with patient and family               patient well known to me with history of massive PE with cardiac arrest over a year ago, history of acute kidney injury, history of gastric sleeve, B12 deficiency, new diagnosis of hypertension- recently started on lisinopril 10 mg , history of work contracted covid-19.  Chronic chest pain but worse - recent hospital stay and had complete neuro workup including lumbar puncture, MRI, CT chest angiogram, workup for rheumatology disease and symptoms thought secondary to covid infection and/or nutritional deficiencies   Has upcoming appointment in office with neurology in 2 weeks     Missed appointment with neurology earlier this week because they called mother's number and had virtual appointment with neurology earlier today.  Amitriptyline was increased and continue on lyrica and lidocaine ointment   \"I'm so frustrated with this stuff.  Before Corona virus wasn't treated for any vitamin deficiencies except for " "potassium\" - \"  Vitamins I am low in- not low in-now I'm just frustrated with this whole thing.\"  \"I was doing fine before this corona virus infection\"  Nothing really changed.  Told neurologist this morning   \"I was Taking everything I was told I was to take.  Set up medications up every week\".    Continues with Slurred speech.  Told neurologist it is the same.    I don't feel like normal self ever since I had this corona virus  3 year old can talk better than me  Corona virus messed me body up- can barely walk with walker  Takes 10 minutes to get up and down flight of stairs   Struggles to speak  These symptoms either dormant and heightened due to corona virus- mom is on the phone and reports \" This child is in so much pain-touch hands and ready to punch her \"  Mom states that even though she talked a little decent with provider she has a delayed reaction  Doesn't always understand and comprehend  Has been off work since contracted corona virus    Workman's compsays not going to pay until have documentation  Issues most likely came from corona virus  \"Takes longest to get thought out \" \"Haven't heard from OT  Mom reports that she is struggling.  Neurologist states that they are learning every day about this virus  Complains of nerves- can't prove it but think It is from covid but likely  Anxiety level is up.   Rafaela has been Independent but mom over there to check on her at least 3-4 times a day-  Needs note for employer  Mom states that she can take to physical therapy, occupational therapy and speech appointments    Chronic widespread pain, numbness, chest pain, shortness of breath all unchanged from hospital stay      Patient Active Problem List   Diagnosis     Morbid obesity (H)     Vitamin D deficiency     Elevated parathyroid hormone     Encounter for smoking cessation counseling     Menorrhagia with irregular cycle     Morbid (severe) obesity due to excess calories (H)     Pulmonary embolism with " infarction (H)     Cardiac arrest, cause unspecified (H)     VT (ventricular tachycardia) (H)     Other pulmonary embolism with acute cor pulmonale, unspecified chronicity (H)     Secondary hyperparathyroidism, not elsewhere classified (H)     Paresthesias     Hemorrhoids, unspecified hemorrhoid type     Anxiety     Polyneuropathy     Past Surgical History:   Procedure Laterality Date     GYN SURGERY      2 C-sections     KNEE SURGERY  most recently - [id number]    3 surgeries in [place]     LAPAROSCOPIC GASTRIC SLEEVE N/A 3/26/2019    Procedure: Laparoscopic Sleeve Gastrectomy;  Surgeon: Luan Lopez MD;  Location: [facility] OR     ORTHOPEDIC SURGERY      2 knee meniscus surgery       Social History     Tobacco Use     Smoking status: Former Smoker     Years: 1.00     Start date: 2016     Last attempt to quit: 2018     Years since quittin.4     Smokeless tobacco: Never Used   Substance Use Topics     Alcohol use: Not Currently     Alcohol/week: 0.0 standard drinks     Comment: occasional     Family History   Problem Relation Age of Onset     Diabetes Father      Hypertension Father      Breast Cancer Sister 26        surgery only     Cancer Sister      Coronary Artery Disease Other         paternal aunt     Thyroid Disease Other         neice     Coronary Artery Disease Other      Cerebrovascular Disease Other      Breast Cancer Sister      Thyroid Disease Other      Cerebrovascular Disease No family hx of          Current Outpatient Medications   Medication Sig Dispense Refill     amitriptyline (ELAVIL) 50 MG tablet Take 1.5 tablets (75 mg) by mouth At Bedtime 45 tablet 3     folic acid (FOLVITE) 1 MG tablet Take 1 tablet (1 mg) by mouth daily 30 tablet 11     hydrOXYzine (ATARAX) 25 MG tablet Take 1-2 tablets (25-50 mg) by mouth every 6 hours as needed for other (adjuvant pain) 150 tablet 0     lidocaine (XYLOCAINE) 4 % external solution Apply 10 mLs topically 3 times daily as needed for moderate  pain 150 mL 3     lisinopril (ZESTRIL) 10 MG tablet Take 1 tablet (10 mg) by mouth daily 30 tablet 0     melatonin 1 MG TABS tablet Take 1 tablet (1 mg) by mouth nightly as needed for sleep       Multiple Vitamins-Minerals (MULTIVITAMIN ADULTS) TABS Take 1 tablet by mouth daily       phenylephrine-shark liver oil-mineral oil-petrolatum (PREPARATION H) 0.25-14-74.9 % rectal ointment Place rectally 2 times daily as needed for hemorrhoids       polyethylene glycol (MIRALAX) 17 GM/SCOOP powder Take 17 g (1 capful) by mouth daily 850 g 3     pregabalin (LYRICA) 150 MG capsule Take 1 capsule (150 mg) by mouth 3 times daily 90 capsule 3     rivaroxaban ANTICOAGULANT (XARELTO ANTICOAGULANT) 20 MG TABS tablet Take 1 tablet (20 mg) by mouth daily (with dinner) 90 tablet 3     traMADol (ULTRAM) 50 MG tablet Take 1 tablet (50 mg) by mouth every 6 hours as needed for moderate pain 15 tablet 0     vitamin B1 (THIAMINE) 100 MG tablet Take 1 tablet (100 mg) by mouth daily (Patient taking differently: Take 250 mg by mouth daily ) 30 tablet 0     vitamin C (ASCORBIC ACID) 1000 MG TABS Take 1 tablet (1,000 mg) by mouth daily 30 tablet 0     vitamin D3 (CHOLECALCIFEROL) 2000 units (50 mcg) tablet Take 1 tablet (2,000 Units) by mouth daily 30 tablet 0     acetaminophen (TYLENOL) 325 MG tablet Take 2 tablets (650 mg) by mouth every 6 hours as needed for mild pain or fever (Patient not taking: Reported on 6/19/2020)       calcium citrate-vitamin D (CITRACAL) 315-250 MG-UNIT TABS per tablet Take 1 tablet by mouth 2 times daily (Patient not taking: Reported on 6/19/2020) 60 tablet 0     BP Readings from Last 3 Encounters:   06/11/20 (!) 131/93   06/05/20 120/73   05/27/20 128/80    Wt Readings from Last 3 Encounters:   06/19/20 113.7 kg (250 lb 9.6 oz)   06/08/20 113.7 kg (250 lb 9.6 oz)   06/04/20 113.9 kg (251 lb)                    Reviewed and updated as needed this visit by Provider  Tobacco  Meds  Med Hx  Surg Hx  Fam Hx  Soc Hx         Review of Systems   Constitutional, HEENT, cardiovascular, pulmonary, gi and gu systems are negative, except as otherwise noted.       Objective   Reported vitals:  There were no vitals taken for this visit.   alert, mild distress and crying  PSYCH: Alert and oriented times 3; coherent speech, slower rate of speech and low volume, is able to articulate logical thoughts, able, no tangential thoughts, no hallucinations   or delusions  Her affect is anxious, tearful and angry  RESP: No cough, no audible wheezing, able to talk in full sentences  Remainder of exam unable to be completed due to telephone visits    Diagnostic Test Results:  none         Assessment/Plan:  1. Polyneuropathy  No improvement since hospital stay- will refer for physical therapy, OT and speech therapy for assistance  Followed by neurology - OCCUPATIONAL THERAPY REFERRAL; Future  - [facility] PT, HAND, AND CHIROPRACTIC REFERRAL; Future  - SPEECH THERAPY REFERRAL; Future    2. Paresthesias  As above     3. Benign essential hypertension  Continue lisinopril 10 mg daily for now- recheck MA blood pressure check and basic metabolic panel approximately July 8 to see if we need to continue lisinopril or make changes   - Basic metabolic panel; Future    4. Speech disturbance, unspecified type  As above   - SPEECH THERAPY REFERRAL; Future    5. Difficulty walking  As above   - OCCUPATIONAL THERAPY REFERRAL; Future  - [facility] PT, HAND, AND CHIROPRACTIC REFERRAL; Future    6. Generalized muscle weakness  As above   - OCCUPATIONAL THERAPY REFERRAL; Future  - [facility] PT, HAND, AND CHIROPRACTIC REFERRAL; Future  - SPEECH THERAPY REFERRAL; Future    No follow-ups on file.      Phone call duration:  34 minutes    Crissy Madrigal PA-C

## 2020-06-19 NOTE — PATIENT INSTRUCTIONS
Schedule MA visit blood pressure check and labs at [facility] approximately July 8 to see if we need to make adjustments or continue the lisinopril 10 mg daily  Keep appointment with neurology as scheduled.  Follow up with occupational therapy, physical therapy and speech therapy.   Follow up with me approximately July 10 with telephone visit  Return urgently if any change in symptoms, worsening symptoms or other change in symptoms.   I have letter as written below faxed to your employer     Please excuse Hilaria from work due to illness until further notice.  She is being followed by neurology and we believe her symptoms are secondary to the covid infection that she contracted from work but we cannot prove that.      Sincerely,        Crissy Madrigal PA-C

## 2020-06-20 ASSESSMENT — ANXIETY QUESTIONNAIRES: GAD7 TOTAL SCORE: 21

## 2020-06-23 NOTE — PROGRESS NOTES
Medication Appeal **INITIATED**    We have initiated an appeal for the requested medication:  Medication: Pregabalin 150mg caps *DENIED-appeal pending*  Appeal Start Date:  6/23/2020  Insurance Company: [employer] - Phone [phone] Fax [phone]  Comments: Reject at pharmacy says plan limitations exceeded, but per [facility] PMAP #90 per 30 days is within plan limits. Appears to be bumping up against previous claim for #81 25mg capsules billed on 5/27/20. Per conversation with pharmacy help desk and prior authorization dept, this still does need a prior auth for plan limitations but original review had not taken into account fill of 25mg capsules. Urgent PA appeal submitted. Discharge pharmacy sent patient with supply while auth is pending. Will retroactively bill once determination received.  Appeal Letter:        Angela Reyez CPhT  [place] [facility] Liaison  Pronouns: She/Her/Hers    [facility]  [address] [place] [address]  [address] [place] [address] [address] [address]   [email or web address]  [email or web address]   Phone: [phone]  Pager: [phone]  Fax: [phone]

## 2020-06-25 ENCOUNTER — OFFICE VISIT (OUTPATIENT)
Dept: FAMILY MEDICINE | Facility: CLINIC | Age: 30
End: 2020-06-25
Payer: COMMERCIAL

## 2020-06-25 ENCOUNTER — ALLIED HEALTH/NURSE VISIT (OUTPATIENT)
Dept: NURSING | Facility: CLINIC | Age: 30
End: 2020-06-25
Payer: COMMERCIAL

## 2020-06-25 VITALS — HEART RATE: 101 BPM | SYSTOLIC BLOOD PRESSURE: 116 MMHG | DIASTOLIC BLOOD PRESSURE: 81 MMHG

## 2020-06-25 VITALS — DIASTOLIC BLOOD PRESSURE: 83 MMHG | SYSTOLIC BLOOD PRESSURE: 118 MMHG | HEART RATE: 99 BPM

## 2020-06-25 DIAGNOSIS — Z30.42 ENCOUNTER FOR SURVEILLANCE OF INJECTABLE CONTRACEPTIVE: Primary | ICD-10-CM

## 2020-06-25 DIAGNOSIS — G62.9 POLYNEUROPATHY: Primary | ICD-10-CM

## 2020-06-25 DIAGNOSIS — R20.2 PARESTHESIAS: ICD-10-CM

## 2020-06-25 DIAGNOSIS — R41.82 ALTERED MENTAL STATUS: Primary | ICD-10-CM

## 2020-06-25 LAB — HCG UR QL: NEGATIVE

## 2020-06-25 PROCEDURE — 96372 THER/PROPH/DIAG INJ SC/IM: CPT | Performed by: INTERNAL MEDICINE

## 2020-06-25 PROCEDURE — 81025 URINE PREGNANCY TEST: CPT | Performed by: PHYSICIAN ASSISTANT

## 2020-06-25 PROCEDURE — 99207 ZZC NO CHARGE NURSE ONLY: CPT

## 2020-06-25 PROCEDURE — 99214 OFFICE O/P EST MOD 30 MIN: CPT | Performed by: INTERNAL MEDICINE

## 2020-06-25 RX ORDER — TRAMADOL HYDROCHLORIDE 50 MG/1
50 TABLET ORAL EVERY 6 HOURS PRN
Qty: 20 TABLET | Refills: 0 | Status: SHIPPED | OUTPATIENT
Start: 2020-06-25 | End: 2020-07-09

## 2020-06-25 RX ORDER — DULOXETIN HYDROCHLORIDE 30 MG/1
30 CAPSULE, DELAYED RELEASE ORAL DAILY
Qty: 30 CAPSULE | Refills: 5 | Status: SHIPPED | OUTPATIENT
Start: 2020-06-25 | End: 2020-07-02

## 2020-06-25 RX ADMIN — CYANOCOBALAMIN 1000 MCG: 1000 INJECTION, SOLUTION INTRAMUSCULAR; SUBCUTANEOUS at 15:33

## 2020-06-25 NOTE — PROGRESS NOTES
"RN Triage: disoriented and imbalanced while walking with slurred speech      Initial /83 (BP Location: Left arm, Patient Position: Sitting, Cuff Size: Adult Large)   Pulse 99  Estimated body mass index is 38.96 kg/m  as calculated from the following:    Height as of 6/19/20: 1.708 m (5' 7.25\").    Weight as of 6/19/20: 113.7 kg (250 lb 9.6 oz).  BP completed using cuff size: large    Assessment:  Patient arrived for depo and  B-12 injection. When writer went to find patient in lobby it was difficult to arose her when she was sleeping. Patient was not alert and  Oriented. Patient was confused and her balance was off. Patient was not able to ambulate down hallway safely without assistance from writer. Patient was swaying while walking.  Writer was able to get her safely to a  Room and then had MA get a provider. Patient had slurred speech and was confused and had a difficult time keeping her eyes open even when trying to ambulate down the hallway. She had difficulty sitting upright by herself.       Triage Dispo: Huddle with Provider to determine plan: put on provider schedule and provider to determine plan. It was determined by Dr. Tejada and Dr. Andersen that patient stable to remain in clinic for a clinic apt.     Intervention:  Writer remained with patient until provider arrived.  Provider to determine if patient should have depo and B-12 after he does assessment of patient. Writer did not proceed with depo injection. It appears that patient did receive the cyanocobalamin injection 1,000 mcg at visit.       Sophia Zamora, RN, RN      "

## 2020-06-25 NOTE — NURSING NOTE
"RN Triage:         Initial /83 (BP Location: Left arm, Patient Position: Sitting, Cuff Size: Adult Large)   Pulse 99  Estimated body mass index is 38.96 kg/m  as calculated from the following:    Height as of 6/19/20: 1.708 m (5' 7.25\").    Weight as of 6/19/20: 113.7 kg (250 lb 9.6 oz).  BP completed using cuff size: large    Assessment:  Patient arrived for depo and  B-12 injection. When writer went to find patient in lobby is was difficult to arose her when she was sleeping. Patient was not alert and  Oriented. Patient was confused and her balance was off. Patient was not able to ambulate down hallway safely without assistance from writer. Writer was able to get her safely to a  Room and then had MA get a provider.     Triage Dispo: Huddle with Provider to determine plan: put on provider schedule.      Intervention: provider to determine if patient should have depo and B-12 after he does assessment of patient.    Sophia Zamora, RN, RN      "

## 2020-06-25 NOTE — PROGRESS NOTES
Subjective     Hilaria Bonner is a 29 year old female who presents to clinic today for the following health issues:    HPI     Burning sensation of both hands, as if being dipped on hot water and as if someone is sticking sharp objects and dipped in rubbing alcohol. Sometimes, it also affects her feet. Hands feel worse when pronated. Told that she has low thiamine and folate levels. Patient has history of gastric surgery.    Still cannot taste well. Nose feels stuffed with yellowish discharge.   She also has numbness of both feet.    Gait abnormalities and speech impairment occurred after COVID-19 infection.    While she was infected with COVID-19, couldn't smell and no taste. 5/2/2020 COVID-19 test is negative.  No Vitamin B12 deficiency prior to COVID-19. Normal brain MRI and normal cervical spine MRI.    Saw Neurology last 6/19/2019. Prior to the video visit last 5/28/2020.        Patient Active Problem List   Diagnosis     Morbid obesity (H)     Vitamin D deficiency     Elevated parathyroid hormone     Encounter for smoking cessation counseling     Menorrhagia with irregular cycle     Morbid (severe) obesity due to excess calories (H)     Pulmonary embolism with infarction (H)     Cardiac arrest, cause unspecified (H)     VT (ventricular tachycardia) (H)     Other pulmonary embolism with acute cor pulmonale, unspecified chronicity (H)     Secondary hyperparathyroidism, not elsewhere classified (H)     Paresthesias     Hemorrhoids, unspecified hemorrhoid type     Anxiety     Polyneuropathy     Past Surgical History:   Procedure Laterality Date     GYN SURGERY      2 C-sections     KNEE SURGERY  most recently - [id number]    3 surgeries in [place]     LAPAROSCOPIC GASTRIC SLEEVE N/A 3/26/2019    Procedure: Laparoscopic Sleeve Gastrectomy;  Surgeon: Luan Lopez MD;  Location: [facility]     ORTHOPEDIC SURGERY      2 knee meniscus surgery       Social History     Tobacco Use     Smoking status: Former Smoker      Years: 1.00     Start date: 2016     Last attempt to quit: 2018     Years since quittin.4     Smokeless tobacco: Never Used   Substance Use Topics     Alcohol use: Not Currently     Alcohol/week: 0.0 standard drinks     Comment: occasional     Family History   Problem Relation Age of Onset     Diabetes Father      Hypertension Father      Breast Cancer Sister 26        surgery only     Cancer Sister      Coronary Artery Disease Other         paternal aunt     Thyroid Disease Other         neice     Coronary Artery Disease Other      Cerebrovascular Disease Other      Breast Cancer Sister      Thyroid Disease Other      Cerebrovascular Disease No family hx of          No Known Allergies  Recent Labs   Lab Test 06/10/20  0931 20  0727 20  0638 20  0450  20  1413 20  1515  20  1154  19  1840  18  0938 16  1121   A1C  --   --   --   --   --   --   --   --  5.2  --   --  5.3  --  5.3  --    LDL  --   --   --   --   --   --   --   --  70  --   --   --   --  90 142*   HDL  --   --   --   --   --   --   --   --  78  --   --   --   --  71 70   TRIG  --   --   --   --   --   --   --   --  104  --  108  --   --  183* 98   ALT  --   --  26 37  --  42  --    < >  --    < > 27  --    < >  --   --    CR 0.65 0.60 0.47* 0.62  --  0.63  --    < > 0.58   < > 0.70 0.70   < >  --   --    GFRESTIMATED >90 >90 >90 >90  --  >90  --    < > >90   < > >60 >90   < >  --   --    GFRESTBLACK >90 >90 >90 >90  --  >90  --    < > >90   < > >60 >90   < >  --   --    POTASSIUM 4.1 4.2 3.8 4.3   < > 3.6  --    < > 3.1*   < > 4.5 3.5   < >  --   --    TSH  --   --   --   --   --  1.26 0.80  --  0.61  --   --   --    < > 2.39 2.45    < > = values in this interval not displayed.      BP Readings from Last 3 Encounters:   20 118/83   20 116/81   20 (!) 131/93    Wt Readings from Last 3 Encounters:   20 113.7 kg (250 lb 9.6 oz)   20 113.7 kg (250 lb  9.6 oz)   06/04/20 113.9 kg (251 lb)                      Reviewed and updated as needed this visit by Provider         Review of Systems   CONSTITUTIONAL: NEGATIVE for fever, chills, change in weight  INTEGUMENTARY/SKIN: NEGATIVE for worrisome rashes, moles or lesions  EYES: NEGATIVE for vision changes or irritation  ENT/MOUTH: NEGATIVE for ear, mouth and throat problems  RESP: NEGATIVE for significant cough or SOB  BREAST: NEGATIVE for masses, tenderness or discharge  CV: NEGATIVE for chest pain, palpitations or peripheral edema  GI: NEGATIVE for nausea, abdominal pain, heartburn, or change in bowel habits  : NEGATIVE for frequency, dysuria, or hematuria  MUSCULOSKELETAL: NEGATIVE for significant arthralgias or myalgia  NEURO: As above.  ENDOCRINE: NEGATIVE for temperature intolerance, skin/hair changes  HEME: NEGATIVE for bleeding problems  PSYCHIATRIC: NEGATIVE for changes in mood or affect      Objective    /83 (BP Location: Left arm, Patient Position: Sitting, Cuff Size: Adult Large)   Pulse 99   There is no height or weight on file to calculate BMI.  Physical Exam   GENERAL: healthy, alert and no distress  EYES: Eyes grossly normal to inspection, PERRL and conjunctivae and sclerae normal  HENT: ear canals and TM's normal, nose and mouth without ulcers or lesions  NECK: no adenopathy, no asymmetry, masses, or scars and thyroid normal to palpation  RESP: lungs clear to auscultation - no rales, rhonchi or wheezes  CV: regular rate and rhythm, normal S1 S2, no S3 or S4, no murmur, click or rub, no peripheral edema and peripheral pulses strong  ABDOMEN: soft, nontender, no hepatosplenomegaly, no masses and bowel sounds normal  MS: no gross musculoskeletal defects noted, no edema  SKIN: no suspicious lesions or rashes  NEURO: Normal strength and tone, mentation intact and speech normal  PSYCH: mentation appears normal, affect normal/bright    Diagnostic Test Results:  Labs reviewed in Epic     "    Assessment & Plan     1. Polyneuropathy    - NEUROLOGY ADULT REFERRAL  - [facility]; Future  - traMADol (ULTRAM) 50 MG tablet; Take 1 tablet (50 mg) by mouth every 6 hours as needed for moderate pain  Dispense: 20 tablet; Refill: 0       BMI:   Estimated body mass index is 38.4 kg/m  as calculated from the following:    Height as of 6/19/20: 1.708 m (5' 7.25\").    Weight as of 7/2/20: 112 kg (247 lb).   Weight management plan: diet and exercise.        FUTURE APPOINTMENTS:       - Follow-up visit in 4 weeks.      Rajiv Andersen MD  [facility]      "

## 2020-06-26 ENCOUNTER — PATIENT OUTREACH (OUTPATIENT)
Dept: CARE COORDINATION | Facility: CLINIC | Age: 30
End: 2020-06-26

## 2020-06-26 NOTE — PROGRESS NOTES
Clinic Care Coordination Contact    Follow Up Progress Note      Assessment: Outreach to patient. Left message on her voicemail and requested return call.    Patient presented to PCP clinic on 6/25/20 for B-12 and Depo injections.   Per chart review; when RN went to get patient, she was sound asleep, difficult to arouse and then unsteady on her feet and slow to respond. Patient was added to MD schedule and seen.     Goals addressed this encounter:   Goals Addressed    None     Intervention/Education provided during outreach: Requested return call from patient. Reminded patient of neuro face to face appointment at [facility] on 7/2/2020.     Outreach Frequency: monthly    Plan:   Patient will return call to RN clinic care coordinator.    Care Coordinator will follow up in 2-4 weeks.    Monica Hernandez RN, [facility] - Primary [facility] Clinic RN Coordinator  [facility]   6/26/2020    10:42 AM  [phone]

## 2020-06-26 NOTE — PROGRESS NOTES
Medication Appeal **APPROVED**    Medication: Pregabalin 150mg caps *APPROVED*  Authorization Effective Date: 5/30/2020  Authorization Expiration Date: 6/30/2020  Approved Dose/Quantity: >90 capsules per 30 days  Reference #: *NEW* PA Case ID: [id number]   Insurance Company: [employer] - Phone [phone] Fax [phone]  Expected CoPay: $0.00     CoPay Card Available: No    Foundation Assistance Needed: n/a  Which Pharmacy is filling the prescription (Not needed for infusion/clinic administered): [facility] [place], [place] - [address]  Comments: Reject at pharmacy says plan limitations exceeded, but per [facility] PMA #90 per 30 days is within plan limits. Appears to be bumping up against previous claim for #81 25mg capsules billed on 5/27/20. Per conversation with pharmacy help desk and prior authorization dept, this still does need a prior auth for plan limitations but original review had not taken into account fill of 25mg capsules. Urgent PA resubmitted. Discharge pharmacy sent patient with supply while auth is pending. *Retroactively billed*          Angela Reyez CPhT  [facility] Liaison  Pronouns: She/Her/Hers    [facility]  [address]  [address], [place] [address]   [email or web address]  [email or web address]   Phone: [phone]  Pager: [phone]  Fax: [phone]

## 2020-07-02 ENCOUNTER — OFFICE VISIT (OUTPATIENT)
Dept: NEUROLOGY | Facility: CLINIC | Age: 30
End: 2020-07-02
Payer: COMMERCIAL

## 2020-07-02 ENCOUNTER — TELEPHONE (OUTPATIENT)
Dept: PALLIATIVE MEDICINE | Facility: CLINIC | Age: 30
End: 2020-07-02

## 2020-07-02 ENCOUNTER — MYC MEDICAL ADVICE (OUTPATIENT)
Dept: FAMILY MEDICINE | Facility: CLINIC | Age: 30
End: 2020-07-02

## 2020-07-02 VITALS
DIASTOLIC BLOOD PRESSURE: 77 MMHG | WEIGHT: 247 LBS | HEART RATE: 109 BPM | OXYGEN SATURATION: 98 % | BODY MASS INDEX: 38.4 KG/M2 | SYSTOLIC BLOOD PRESSURE: 114 MMHG

## 2020-07-02 DIAGNOSIS — G62.9 POLYNEUROPATHY: ICD-10-CM

## 2020-07-02 LAB — FOLATE SERPL-MCNC: 30.8 NG/ML

## 2020-07-02 PROCEDURE — 84425 ASSAY OF VITAMIN B-1: CPT | Mod: 90 | Performed by: PSYCHIATRY & NEUROLOGY

## 2020-07-02 PROCEDURE — 82746 ASSAY OF FOLIC ACID SERUM: CPT | Performed by: PSYCHIATRY & NEUROLOGY

## 2020-07-02 PROCEDURE — 36415 COLL VENOUS BLD VENIPUNCTURE: CPT | Performed by: PSYCHIATRY & NEUROLOGY

## 2020-07-02 PROCEDURE — 99214 OFFICE O/P EST MOD 30 MIN: CPT | Performed by: PSYCHIATRY & NEUROLOGY

## 2020-07-02 PROCEDURE — 99000 SPECIMEN HANDLING OFFICE-LAB: CPT | Performed by: PSYCHIATRY & NEUROLOGY

## 2020-07-02 RX ORDER — DULOXETIN HYDROCHLORIDE 30 MG/1
60 CAPSULE, DELAYED RELEASE ORAL DAILY
Qty: 60 CAPSULE | Refills: 5 | Status: SHIPPED | OUTPATIENT
Start: 2020-07-02 | End: 2020-08-20

## 2020-07-02 ASSESSMENT — PAIN SCALES - GENERAL: PAINLEVEL: WORST PAIN (10)

## 2020-07-02 NOTE — LETTER
7/2/2020         RE: Hilaria Bonner  [address]  [address]  [facility] [address]        Dear Colleague,    Thank you for referring your patient, Hilaria Bonner, to the [facility]. Please see a copy of my visit note below.    Hilaria Bonner's goals for this visit include:   Chief Complaint   Patient presents with     RECHECK     Polyneuropathy LOV 6/19/2020       She requests these members of her care team be copied on today's visit information: no    PCP: Crissy Madrigal    Referring Provider:  Momo Sher MD  [address] [facility] [address]  [place] [address]    There were no vitals taken for this visit.    Do you need any medication refills at today's visit? Unsure.    Leanna Casey LPN      Visit Date:   07/02/2020      Hilaria Bonner returns for followup of a painful peripheral neuropathy.  She is a patient who was diagnosed with COVID-19 and a few weeks later developed symptoms of a painful polyneuropathy.  She was ultimately hospitalized and underwent an extensive evaluation.  There were some nutritional deficiencies identified including a low thiamin and folic acid that were felt likely related to her previous weight loss surgery.  She was started on supplemental thiamine and folic acid.      She continues to have a great deal of stinging, burning pain in her hands and feet.  When I discussed this with her a couple of weeks ago, I recommended increasing her amitriptyline dose to 75 mg, but that did not help and her primary care provider has since started her on duloxetine 30 mg.  She continues on Lyrica 150 mg 3 times a day.  She is taking a thiamine and folic acid supplement as well as receiving B12 injections monthly.      PHYSICAL EXAMINATION:   VITAL SIGNS:  Her heart rate is 109.  Blood pressure 114/77.      NEUROLOGIC EXAMINATION:  She tends to speak slowly, but she is not dysarthric.  She has a somewhat laborious gait.  Cranial nerves II-XII appear intact.  Her  motor examination reveals normal strength.  On sensory examination, she cannot identify position change in the fingers or the toes.  She has absent vibratory sense in her fingers and toes.  She reports decreased pinprick to the elbows and to the knees.  She intermittently shakes her right hand when she is sitting.  Her reflexes are 1+ at the biceps, 2+ at the triceps and absent in the lower extremities.  Plantar responses are flexor.      IMPRESSION:  Acute painful polyneuropathy.      PLAN:  Again, the question arises as to whether the neuropathy is related to a post-COVID-19 polyneuritis, which I still think is a legitimate concern, or strictly related to the nutritional deficiencies which I suspect is less likely.  Unfortunately, there is no to prove that this is a post-COVID-19 polyneuritis, although I do note that it has been reported in the literature and I did discuss this again today.      For further evaluation, I have ordered an EMG examination to get a better idea of the severity of her neuropathy and its quality.      She is now on a combination of amitriptyline and Cymbalta and since the amitriptyline did not help at a dose of 75 mg, I have asked her to stop the amitriptyline and increase the Cymbalta to 60 mg a day.      She will continue on Lyrica.      My sense is that pain is going to be a difficult management issue with her, and to that end, I am referring her to the Pain [facility].      I am going to recheck her thiamine and folic acid level today.  I do plan to see her back in 6 weeks.      Total visit time was 25 minutes.  More than 50% of this was spent in counseling and coordination of care.        I did write a letter indicating that she is unable to work in any capacity at this time.         MAKAYLA WARREN MD             D: 2020   T: 2020   MT: GEREMIAS      Name:     RADHA JOHANSEN   MRN:      [id number]        Account:      [id number]   :      1990           Visit Date:    07/02/2020      Document: [id number]        Again, thank you for allowing me to participate in the care of your patient.        Sincerely,        Momo Sher MD

## 2020-07-02 NOTE — TELEPHONE ENCOUNTER

## 2020-07-02 NOTE — PROGRESS NOTES
Visit Date:   07/02/2020      Hilaria Bonner returns for followup of a painful peripheral neuropathy.  She is a patient who was diagnosed with COVID-19 and a few weeks later developed symptoms of a painful polyneuropathy.  She was ultimately hospitalized and underwent an extensive evaluation.  There were some nutritional deficiencies identified including a low thiamin and folic acid that were felt likely related to her previous weight loss surgery.  She was started on supplemental thiamine and folic acid.      She continues to have a great deal of stinging, burning pain in her hands and feet.  When I discussed this with her a couple of weeks ago, I recommended increasing her amitriptyline dose to 75 mg, but that did not help and her primary care provider has since started her on duloxetine 30 mg.  She continues on Lyrica 150 mg 3 times a day.  She is taking a thiamine and folic acid supplement as well as receiving B12 injections monthly.      PHYSICAL EXAMINATION:   VITAL SIGNS:  Her heart rate is 109.  Blood pressure 114/77.      NEUROLOGIC EXAMINATION:  She tends to speak slowly, but she is not dysarthric.  She has a somewhat laborious gait.  Cranial nerves II-XII appear intact.  Her motor examination reveals normal strength.  On sensory examination, she cannot identify position change in the fingers or the toes.  She has absent vibratory sense in her fingers and toes.  She reports decreased pinprick to the elbows and to the knees.  She intermittently shakes her right hand when she is sitting.  Her reflexes are 1+ at the biceps, 2+ at the triceps and absent in the lower extremities.  Plantar responses are flexor.      IMPRESSION:  Acute painful polyneuropathy.      PLAN:  Again, the question arises as to whether the neuropathy is related to a post-COVID-19 polyneuritis, which I still think is a legitimate concern, or strictly related to the nutritional deficiencies which I suspect is less likely.  Unfortunately,  there is no to prove that this is a post-COVID-19 polyneuritis, although I do note that it has been reported in the literature and I did discuss this again today.      For further evaluation, I have ordered an EMG examination to get a better idea of the severity of her neuropathy and its quality.      She is now on a combination of amitriptyline and Cymbalta and since the amitriptyline did not help at a dose of 75 mg, I have asked her to stop the amitriptyline and increase the Cymbalta to 60 mg a day.      She will continue on Lyrica.      My sense is that pain is going to be a difficult management issue with her, and to that end, I am referring her to the Pain Clinic.      I am going to recheck her thiamine and folic acid level today.  I do plan to see her back in 6 weeks.      Total visit time was 25 minutes.  More than 50% of this was spent in counseling and coordination of care.        I did write a letter indicating that she is unable to work in any capacity at this time.     ADDENDUM 20: Folate normal. Thiamin 271 (high). Discussed with patient. Advised no harm in high thiamin but she can take her current supplement every other day. Continue with current folic acid supplement.    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 I Discussed with patient. I would like her to see Dr Chua or Dr San about this. Her symptom onset did relate to Covid 19 infection and would like expert opinion. Will also check paraneoplastic antibodies.        MAKAYLA WARREN MD             D: 2020   T: 2020   MT: GEREMIAS      Name:     RADHA JOHANSEN   MRN:      [id number]        Account:      [id number]   :      1990           Visit Date:   2020      Document: [id number]

## 2020-07-02 NOTE — LETTER
2020      RE: Hilaria Bonner  : 1990       To whom it may concern:    Hilaria Bonner was seen in our clinic today. She is currently unable to work in any capacity due to a severe painful polyneuropathy.    Sincerely,          Momo Sher MD

## 2020-07-02 NOTE — PROGRESS NOTES
Hilaria Bonner's goals for this visit include:   Chief Complaint   Patient presents with     RECHECK     Polyneuropathy LOV 6/19/2020       She requests these members of her care team be copied on today's visit information: no    PCP: Crissy Madrigal    Referring Provider:  Momo Sher MD  [address] [place] [address] [address]  [place] [address]    There were no vitals taken for this visit.    Do you need any medication refills at today's visit? Unsure.    Leanna Casey LPN

## 2020-07-04 LAB — VIT B1 BLD-MCNC: 271 NMOL/L (ref 70–180)

## 2020-07-05 NOTE — TELEPHONE ENCOUNTER
Form signed. Patient will need to complete out portion of form. Placed in Carolann  Bin notify patient and place at  to

## 2020-07-07 NOTE — PROGRESS NOTES
"Hilaria Bonner is a 29 year old female who is being evaluated via a billable video visit.      The patient has been notified of following:     \"This video visit will be conducted via a call between you and your physician/provider. We have found that certain health care needs can be provided without the need for an in-person physical exam.  This service lets us provide the care you need with a video conversation.  If a prescription is necessary we can send it directly to your pharmacy.  If lab work is needed we can place an order for that and you can then stop by our lab to have the test done at a later time.    Video visits are billed at different rates depending on your insurance coverage.  Please reach out to your insurance provider with any questions.    If during the course of the call the physician/provider feels a video visit is not appropriate, you will not be charged for this service.\"    Patient has given verbal consent for Video visit? Yes  How would you like to obtain your AVS? [facility] [place]  Patient would like the video invitation sent by: [employer] [phone]  dWill anyone else be joining your video visit? [facility]     Date of visit: 7/9/2020    Reason for consultation:    Hilaria Bonner is a 29 year old female who is seen in consultation today at the request of her neurologist, Dr. Sher, for evaluation of her pain issues and recommendations for management, with specific emphasis on  Reason for Referral: Evaluation for comprehensive services- patients will be evaluated if appropriate for comprehensive service including medication changes, procedures, pain psychology, and pain physical therapy.  While involved with comprehensive services, pain providers will work with referring provider/PCP to stabilize appropriate medication management, with long-term plan of transition of prescribing back to referring provider/PCP upon completion of comprehensive services.   "     Please complete the following questions:     Do you have any specific questions for the pain specialist? Yes: Help manage pain     Are there any red flags that may impact the assessment or management of the patient? None       What is your diagnosis for the patient's pain? Polyneuritis, Painful neuropathy      Please see the [facility] health questionnaire which the patient completed and reviewed with me in detail.    Review of [place] Prescription Monitoring Program (): No concern for abuse or misuse of controlled medications based on this report.     Pain medications are being prescribed by Dr. Vance.     Subjective:    Chief Complaint:    Chief Complaint   Patient presents with     Pain       Pain history:  Hilaria Bonner is a 29 year old female who presents for initial evaluation of chief complaint of upper and lower extremity pain. Her mother is also present for the visit.     She first started having problems with upper and lower extremity pain in May. She was diagnosed with COVID-19 in mid April and a few weeks later developed symptoms of a painful polyneuropathy (pain and tingling of hands and feet). She was hospitalized and had extensive evaluation. It was determined that she had nutritional deficiencies including low thiamin and folic acid, thought to be related to previous weight loss surgery. She was started on supplemental thiamine and folic acid. Her pain has continued. She was referred to neurology, has worked with Dr. Sher. It is unclear whether her symptoms are related to COVID-19 or nutritional deficiencies. Dr. Sher recently ordered an upper and lower EMG, this is scheduled. She was started on amitriptyline and increased to 75mg without benefit. She was started on Lyrica and increased to 150mg TID with some benefit. She was started on Cymbalta and this dose was just increased (she was not able to start this yet) with unclear benefit. She has been intermittently been  "prescribed Tramadol by primary care provider with mild benefit only, ran out of this recently. She feels like her hands and fingers move involuntarily. She also reports numbness and trouble tasting and smelling. Her pain is located in bilateral hands and feet. Her pain is from her feet to her knees. Describes the pain is \"burning\" and \"needles.\" Her pain is only in her hands.       Pain description:  Location: hands and feet  Quality: burning  Severity/Intensity: 8/10 at best, 10/10 at worst, 10/10 on average  Aggravating factors include: cold and heat  Relieving factors include: \"nothing\"     The patient otherwise denies bowel or bladder incontinence, parasthesias, weakness, saddle anesthesia, unintentional weight loss, or fever/chills/sweats.     Hilaria Bonner has not been seen at a pain clinic in the past.      Pain Treatments:  (H--helped; HI--Helped initially; [facility]--Somewhat helpful; NH--No help; W--worse; SE--side effects; ?--Unsure if helpful)   1. Medications:       Current pain medications:   Lyrica 150mg TID- [facility], has been at this dose over a month, \"a little bit\"    Tramadol 50mg Q6h- [facility], was taking 100mg at a time, ran out a few days ago   Cymbalta 60mg daily- ?, started on 30mg daily at the end of June, was prescribed 60mg but hasn't been able to start yet    Hydroxyzine 25-50mg prn- H for anxiety   Tylenol 1000mg BID or TID- NH   Thiamine 250mg daily   Folate 1mg daily    Current calculated MME: 0    1. Previous Pain Relevant Medications:  NOTE: This medication information taken from patient's intake form, not medical records.    Opiates: Tramadol- [facility]   NSAIDS: doesn't take anti-inflammatories due to gastric bypass    Muscle Relaxants: tizanidine- H for sleep only   Anti-migraine mediations: no   Anti-depressants: amitriptyline (up to 75mg)- ?, \"it put mme to sleep\", Cymbalta- ?   Sleep aids: no   Anxiolytics: hydroxyzine- H    Neuropathics: Lyrica- SW, gabapentin (started on for numbness in toes " and migraines after cardiac arrest)- SE, fatigue, Topamax- H for weight loss   Topicals: gabapentin cream- NH/[facility], lidocaine cream- NH, W, burned more   Other medications not covered above: Tylenol- NH    2. Physical Therapy: starting physical therapy and occupational therapy at the end of this month  3. Pain Psychology: no  4. Surgery: gastric bypass March 2019  5. Injections: no  6. Chiropractic: no  7. Acupuncture: no  8. TENS Unit: no    Past Medical History:  Past Medical History:   Diagnosis Date     Acute kidney injury (H) 05/13/2019     Cardiac arrest (H) 05/13/2019     Earache or other ear, nose, or throat complaint 12/15    tyroid     Pulmonary embolus (H) 05/13/2019       Past Surgical History:  Past Surgical History:   Procedure Laterality Date     GYN SURGERY      2 C-sections     KNEE SURGERY  most recently - [id number]    3 surgeries in [place]     LAPAROSCOPIC GASTRIC SLEEVE N/A 3/26/2019    Procedure: Laparoscopic Sleeve Gastrectomy;  Surgeon: Luan Lopez MD;  Location: [facility] OR     ORTHOPEDIC SURGERY      2 knee meniscus surgery       Medications:  Current Outpatient Medications   Medication Sig Dispense Refill     DULoxetine (CYMBALTA) 30 MG capsule Take 2 capsules (60 mg) by mouth daily 60 capsule 5     folic acid (FOLVITE) 1 MG tablet Take 1 tablet (1 mg) by mouth daily 30 tablet 11     hydrOXYzine (ATARAX) 25 MG tablet Take 1-2 tablets (25-50 mg) by mouth every 6 hours as needed for other (adjuvant pain) 150 tablet 0     lisinopril (ZESTRIL) 10 MG tablet Take 1 tablet (10 mg) by mouth daily 30 tablet 0     melatonin 1 MG TABS tablet Take 1 tablet (1 mg) by mouth nightly as needed for sleep       Multiple Vitamins-Minerals (MULTIVITAMIN ADULTS) TABS Take 1 tablet by mouth daily       phenylephrine-shark liver oil-mineral oil-petrolatum (PREPARATION H) 0.25-14-74.9 % rectal ointment Place rectally 2 times daily as needed for hemorrhoids       polyethylene glycol (MIRALAX) 17 GM/SCOOP powder  Take 17 g (1 capful) by mouth daily 850 g 3     pregabalin (LYRICA) 150 MG capsule Take 1 capsule (150 mg) by mouth 3 times daily 90 capsule 3     rivaroxaban ANTICOAGULANT (XARELTO ANTICOAGULANT) 20 MG TABS tablet Take 1 tablet (20 mg) by mouth daily (with dinner) 90 tablet 3     vitamin B1 (THIAMINE) 100 MG tablet Take 1 tablet (100 mg) by mouth daily (Patient taking differently: Take 250 mg by mouth daily ) 30 tablet 0     vitamin C (ASCORBIC ACID) 1000 MG TABS Take 1 tablet (1,000 mg) by mouth daily 30 tablet 0     vitamin D3 (CHOLECALCIFEROL) 2000 units (50 mcg) tablet Take 1 tablet (2,000 Units) by mouth daily 30 tablet 0       Allergies:   No Known Allergies    Social History:  Home situation: lives in an apartment  Support system: mom  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, was a nursing assistant  Tobacco use: no  Drug use: no  Alcohol use: no  History of chemical dependency treatment: no  Mental health admissions: no    Family history:  Family History   Problem Relation Age of Onset     Diabetes Father      Hypertension Father      Breast Cancer Sister 26        surgery only     Cancer Sister      Coronary Artery Disease Other         paternal aunt     Thyroid Disease Other         neice     Coronary Artery Disease Other      Cerebrovascular Disease Other      Breast Cancer Sister      Thyroid Disease Other      Cerebrovascular Disease No family hx of        Review of Systems:    POSTIVE IN BOLD  GENERAL: fever/chills, fatigue, general unwell feeling, weight gain/loss.  HEAD/EYES:  headache, dizziness, or vision changes.    EARS/NOSE/THROAT: nosebleeds, hearing loss, sinus infection, earache, tinnitus.  IMMUNE:  allergies, cancer, immune deficiency, or infections.  SKIN:  itching, rash, hives  HEME/Lymphatic: anemia, easy bruising, easy bleeding.  RESPIRATORY: cough, wheezing, or shortness of breath  CARDIOVASCULAR/Circulation: extremity edema, syncope, hypertension, tachycardia, or angina.  GASTROINTESTINAL:  abdominal pain, nausea/emesis, diarrhea, constipation,  hematochezia, or melena.  ENDOCRINE:  diabetes, steroid use,  thyroid disease or osteoporosis.  MUSCULOSKELETAL: joint pain, stiffness, neck pain, back pain, arthritis, or gout.  GENITOURINARY: frequency, urgency, dysuria, difficulty voiding, hematuria or incontinence.  NEUROLOGIC: weakness, numbness, paresthesias, seizure, tremor, stroke or memory loss.  PSYCHIATRIC: depression, anxiety, stress, suicidal thoughts or mood swings.     Objective:    Physical Exam:  There were no vitals filed for this visit.  Exam:  Constitutional: Well developed, well nourished, appears stated age. Obese.   HEENT: Head atraumatic, normocephalic. Eyes without conjunctival injection or jaundice. Oropharynx clear. Neck supple. No obvious neck masses.  Skin: No rash, lesions, or petechiae of exposed skin.   Extremities: Peripheral pulses intact. No clubbing, cyanosis, or edema.  Psychiatric/mental status: Alert, without lethargy or stupor. Speech somewhat slow. Appropriate affect. Mood normal. Able to follow commands without difficulty.     DIRE Score for ongoing opioid management is calculated as follows:   Diagnosis = 3 pts (advanced condition; severe pain/objective findings)   Intractability = 2 pts (most treatments tried; patient not fully engaged/barriers)   Risk    Psych = 3 pts (no significant personality dysfunction/mental illness; good communication with clinic)    Chem Hlth = 3 pts (no history of chemical dependency; not drug-focused)   Reliability = 2 pts (occasional difficulties with compliance; generally reliable)   Social = 2 pts (reduction in some relationships/life rolls)   (Psych + Chem hlth + Reliability + Social) = 15     Efficacy = 2 pts (moderate benefit/function; low med dose; too early/not tried meds)         DIRE Score = 17        7-13: likely NOT suitable candidate for long-term opioid analgesia       14-21: may be a suitable candidate for long-term opioid  analgesia     Assessment:  Hilaria Bonner is a 29 year old female with a past medical history significant for PE with associated PEA arrest, morbid obesity s/p sleeve gastrectomy, pancreatitis, and recent COVID 19  who presents with complaints of upper and lower extremity pain.     1. 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   2. Mental Health - the patient's mental health concerns, specifically anxiety, affect her experience of pain and contribute to her clinically significant distress.    1. Polyneuropathy        Plan:    We discussed that as Shama pain has been present for roughly 8 weeks it is not yet considered chronic. Also, she has plans to start physical therapy and occupational therapy later on this month. Cymbalta was just increased to 60mg daily. For these reasons, I suggested we hold off on participating in our pain program for at least another month. If she is not starting to see some improvement in that time frame and is interested in our multidisciplinary program, I would recommend participation in our pain program. I am happy to provide a list of recommendations in the interim.     Recommendations:   1.  Pain Physical Therapy:     Start physical therapy and occupational therapy as planned later on this month. This could provide great benefit. May consider pain physical therapy in the future.    2.  Pain Psychologist to address relaxation, behavioral change, coping style, and other factors important to improvement.     May consider this resource in a month or so if interested. Pain has made a clear impact on her life.    3.  Diagnostic Studies: have upper and lower extremity EMG as planned.    4.  Medication Management:     1. We discussed today that I do not recommend the use of opioids for long term management of chronic pain. That being said, it could be reasonable to use a lower  strength opioid, like Tramadol, as needed for severe pain for the next month or so. Would not recommend beyond that time frame. She verbalized understands of this.    2. Continue Lyrica at 150mg TID.    3. Increased dose of Cymbalta was directed but she has not started yet, it sounds like there may have been a filling issue at the pharmacy. Start increased dose of Cymbalta 60mg daily as soon as able.    4. May consider the addition of Topamax. Might consider starting low dose nortriptyline as an adjunct to Cymbalta and Lyrica (once no longer taking Tramadol).     5. May consider addition of hemp CBD oil. Could consider medical cannabis in the future.    5.  Referrals: consider purchase of a TENS unit. Consider a trial of acupuncture.    6.  Follow up with RG Puri CNP in 4 weeks or more if interested in participating in our multidisciplinary program.         Video-Visit Details    Type of service:  Video Visit    Video Start Time: 0843  Video End Time: 0933    Originating Location (pt. Location): Home    Distant Location (provider location):  [facility]     Platform used for Video Visit: [employer]    RG Ibrahim CNP

## 2020-07-09 ENCOUNTER — VIRTUAL VISIT (OUTPATIENT)
Dept: PALLIATIVE MEDICINE | Facility: CLINIC | Age: 30
End: 2020-07-09
Attending: PSYCHIATRY & NEUROLOGY
Payer: COMMERCIAL

## 2020-07-09 DIAGNOSIS — I10 BENIGN ESSENTIAL HYPERTENSION: ICD-10-CM

## 2020-07-09 DIAGNOSIS — G62.9 POLYNEUROPATHY: Primary | ICD-10-CM

## 2020-07-09 LAB
ANION GAP SERPL CALCULATED.3IONS-SCNC: 7 MMOL/L (ref 3–14)
BUN SERPL-MCNC: 11 MG/DL (ref 7–30)
CALCIUM SERPL-MCNC: 9 MG/DL (ref 8.5–10.1)
CHLORIDE SERPL-SCNC: 106 MMOL/L (ref 94–109)
CO2 SERPL-SCNC: 26 MMOL/L (ref 20–32)
CREAT SERPL-MCNC: 0.74 MG/DL (ref 0.52–1.04)
GFR SERPL CREATININE-BSD FRML MDRD: >90 ML/MIN/{1.73_M2}
GLUCOSE SERPL-MCNC: 94 MG/DL (ref 70–99)
POTASSIUM SERPL-SCNC: 3.5 MMOL/L (ref 3.4–5.3)
SODIUM SERPL-SCNC: 139 MMOL/L (ref 133–144)

## 2020-07-09 PROCEDURE — 99207 ZZC DOWN CODE DUE TO SUBSEQUENT EXAM: CPT | Performed by: NURSE PRACTITIONER

## 2020-07-09 PROCEDURE — 99215 OFFICE O/P EST HI 40 MIN: CPT | Mod: 95 | Performed by: NURSE PRACTITIONER

## 2020-07-09 PROCEDURE — 36415 COLL VENOUS BLD VENIPUNCTURE: CPT | Performed by: PHYSICIAN ASSISTANT

## 2020-07-09 PROCEDURE — 80048 BASIC METABOLIC PNL TOTAL CA: CPT | Performed by: PHYSICIAN ASSISTANT

## 2020-07-09 ASSESSMENT — PAIN SCALES - GENERAL: PAINLEVEL: WORST PAIN (10)

## 2020-07-09 NOTE — PATIENT INSTRUCTIONS
1.  Pain Physical Therapy:     Start physical therapy and occupational therapy as planned later on this month. This could provide some great benefit.    2.  Pain Psychologist to address relaxation, behavioral change, coping style, and other factors important to improvement.     May consider this resource in a month or so if interested. Pain makes a large impact on life.    3.  Diagnostic Studies:  Have upper and lower extremity EMG as planned.    4.  Medication Management:     1. I do not recommend the use of opioids for long term management of chronic pain. That being said, it could be reasonable to use a lower strength opioid, like Tramadol, as needed for severe pain for the next month or so. Would not recommend beyond that time frame. I would make an appointment with primary care provider to discuss further.      2. Continue Lyrica at current dose.    3. Start increased dose of Cymbalta 60mg daily.    4. I will provide a list of other recommendations to primary care provider/neurologist.   5.  Referrals: consider purchase of a TENS unit. Consider a trial of acupuncture.    6.  Follow up with RG Puri CNP in 4 weeks or more if interested in participating in our multidisciplinary program.

## 2020-07-10 DIAGNOSIS — Z98.84 S/P LAPAROSCOPIC SLEEVE GASTRECTOMY: ICD-10-CM

## 2020-07-10 DIAGNOSIS — I10 BENIGN ESSENTIAL HYPERTENSION: ICD-10-CM

## 2020-07-10 RX ORDER — LISINOPRIL 10 MG/1
10 TABLET ORAL DAILY
Qty: 90 TABLET | Refills: 1 | Status: SHIPPED | OUTPATIENT
Start: 2020-07-10 | End: 2020-12-31

## 2020-07-10 NOTE — RESULT ENCOUNTER NOTE
Aleksey Manrique  Your electrolytes, blood sugar and kidney function were normal.    I recommend that we continue the lisinopril   I have sent a refill to your pharmacy.  Please call or MyChart my office with any questions or concerns.  Crissy Madrigal, PAC

## 2020-07-13 ENCOUNTER — MYC MEDICAL ADVICE (OUTPATIENT)
Dept: FAMILY MEDICINE | Facility: CLINIC | Age: 30
End: 2020-07-13

## 2020-07-13 DIAGNOSIS — G62.9 POLYNEUROPATHY: ICD-10-CM

## 2020-07-13 RX ORDER — TRAMADOL HYDROCHLORIDE 50 MG/1
50 TABLET ORAL EVERY 6 HOURS PRN
Qty: 20 TABLET | Refills: 0 | Status: SHIPPED | OUTPATIENT
Start: 2020-07-13 | End: 2020-07-24

## 2020-07-13 RX ORDER — VITAMIN B COMPLEX
1 TABLET ORAL DAILY
Qty: 90 TABLET | Refills: 0 | Status: CANCELLED | OUTPATIENT
Start: 2020-07-13

## 2020-07-13 RX ORDER — CHOLECALCIFEROL (VITAMIN D3) 50 MCG
1 TABLET ORAL DAILY
Qty: 90 TABLET | Refills: 0 | Status: CANCELLED | OUTPATIENT
Start: 2020-07-13

## 2020-07-13 NOTE — TELEPHONE ENCOUNTER
Per Joanne Sterling's note, patient gets these medications filled through PCP. [employer] message sent to patient for clarification.

## 2020-07-13 NOTE — TELEPHONE ENCOUNTER
Calcium Citrate-Vitamin D 500-500 MG-UNIT CHEW      Last Written Prescription Date:  *not active on med list  Medication not on the Surgery-Bariatric refill protocol.    vitamin D3 (CHOLECALCIFEROL) 2000 units (50 mcg) tablet      Last Written Prescription Date:  6/10/2020  Last Fill Quantity: 30,   # refills: 0  Medication not on the Surgery-Bariatric refill protocol.    B Complex Vitamins (VITAMIN-B COMPLEX) TABS      Last Written Prescription Date:  *not active on med list  Medication not on the Surgery-Bariatric refill protocol.    Last Office Visit : 3/18/2020  [facility]  Future Office visit:  none

## 2020-07-13 NOTE — TELEPHONE ENCOUNTER
Requested Prescriptions   Pending Prescriptions Disp Refills     traMADol (ULTRAM) 50 MG tablet 20 tablet 0     Sig: Take 1 tablet (50 mg) by mouth every 6 hours as needed for moderate pain       There is no refill protocol information for this order        Routing refill request to provider for review/approval because:  Drug not on the [facility] refill protocol   Drug not active on patient's medication list        Ayana Tay RN, BSN, PHN

## 2020-07-14 ENCOUNTER — TELEPHONE (OUTPATIENT)
Dept: SURGERY | Facility: CLINIC | Age: 30
End: 2020-07-14

## 2020-07-14 NOTE — TELEPHONE ENCOUNTER
Was able to speak with patient in regards to supplements. Patient states she needs refills of her bariatric supplements. She is not getting her refills through PCP. Due for yearly follow up in March 2021. Sending Vitamin D3, Calcium citrate-vitamin D and B complex to [facility] in [place] per patient request.

## 2020-07-14 NOTE — TELEPHONE ENCOUNTER
Called patient per request from Via message. No answer, left voicemail. Refill request for bariatric vitamins/supplements. Waiting for patient to clarify if she needs refills from our clinic or if PCP has taken over refills. Sent Via message and gave call back number.

## 2020-07-15 ENCOUNTER — TELEPHONE (OUTPATIENT)
Dept: NEUROLOGY | Facility: CLINIC | Age: 30
End: 2020-07-15

## 2020-07-15 RX ORDER — TRAMADOL HYDROCHLORIDE 50 MG/1
TABLET ORAL
Qty: 20 TABLET | OUTPATIENT
Start: 2020-07-15

## 2020-07-15 NOTE — TELEPHONE ENCOUNTER
PA Initiation    Medication: Duloxetine 30MG -   Insurance Company: [facility] - Phone [phone] Fax [phone]  Pharmacy Filling the Rx: [id number] - [place], [place] - [facility] [place] [address] AT [facility] [address] [facility]  Filling Pharmacy Phone: [phone]  Filling Pharmacy Fax: [phone]  Start Date: 7/15/2020        
Prior Authorization Approval    Medication: Duloxetine 30MG - APPROVED was approved on 7/15/2020  Effective: 7/14/2020 to 7/15/2021  Reference #: [id number]  Approved Dose/Quantity:   Insurance Company: [employer] - Phone [phone] Fax [phone]  Expected CoPay:    Pharmacy Filling the Rx: [id number] [place] [address] [address] AT [address] [facility]  Pharmacy Notified: Yes  Patient Notified: Comment:  **Instructed pharmacy to notify patient when script is ready to /ship.**      
Prior Authorization Retail Medication Request    Medication/Dose:   DULoxetine (CYMBALTA) 30 MG capsule  60 capsule  5  7/2/2020   No    Sig - Route: Take 2 capsules (60 mg) by mouth daily - Oral        ICD code (if different than what is on RX):  Polyneuropathy [G62.9]   Previously Tried and Failed:  See chart  Rationale:  See chart    Insurance Name:  See chart    Pharmacy Information (if different than what is on RX)  Name:  [facility]  Phone:  [phone]    Patient ID [id number]  
supervision

## 2020-07-17 ENCOUNTER — PATIENT OUTREACH (OUTPATIENT)
Dept: CARE COORDINATION | Facility: CLINIC | Age: 30
End: 2020-07-17

## 2020-07-17 NOTE — PROGRESS NOTES
Clinic Care Coordination Contact    Follow Up Progress Note      Assessment: Outreach to patient. Left message on voicemail and requested return call.     Per chart review:  Patient has been working with neurology for pain in hands and feet. She had an initial consult with pain management but they advised waiting another month before she starts their program.     Patient is scheduled to PT visit on Monday 7/20/20.     Goals addressed this encounter:   Goals Addressed                 This Visit's Progress      Improve chronic symptoms (pt-stated)   10%     Goal Statement: I want the burning pain in my hands to improve    Date Goal set: 6/12/20    Barriers: Polyneuropathy    Strengths: Family support    Date to Achieve By: October 2020    Patient expressed understanding of goal: yes    Action steps to achieve this goal:  1. I will continue Lyrica TID  2. I will use Tramadol as instructed for pain  3. I will participate in outpatient therapies         Intervention/Education provided during outreach: Left message on voicemail and requested return call     Outreach Frequency: monthly    Plan:   Patient will continue to follow with neurology, PT and pain management    Care Coordinator will follow up in one month.    Monica Hernandez RN, [facility] RN Coordinator  [facility]   7/17/2020    10:24 AM  [phone]

## 2020-07-21 ENCOUNTER — TELEPHONE (OUTPATIENT)
Dept: NEUROLOGY | Facility: CLINIC | Age: 30
End: 2020-07-21

## 2020-07-21 NOTE — TELEPHONE ENCOUNTER
I called patient to verify in clinic appt . I asked pt to call back in [facility]  I also left information about the screening process as they check in and that they will be required to wear a mask    Nallely Pruett LPN

## 2020-07-23 ENCOUNTER — OFFICE VISIT (OUTPATIENT)
Dept: NEUROLOGY | Facility: CLINIC | Age: 30
End: 2020-07-23
Payer: COMMERCIAL

## 2020-07-23 DIAGNOSIS — G62.9 POLYNEUROPATHY: ICD-10-CM

## 2020-07-23 NOTE — LETTER
7/23/2020       RE: Hilaria Bonner  [address]  [address]  [facility] [address]     Dear Colleague,    Thank you for referring your patient, Hilaria Bonner, to the [facility] EMG at [facility]. Please see a copy of my visit note below.    Nerve Conduction & EMG Report          Patient:       Hilaria Bonner  Patient ID:    [id number]  Gender:        Female  YOB: 1990  Age:           29 Years 6 Months        History & Examination:  Patient is a 28 y/o female with complaints of pain, numbness, paresthesias and burning sensation affecting bilateral hands and feet. She was diagnosed with COVID-19 in mid April and few weeks later (mid May) developed symptoms. Reports that her right side is affected more than the left. Eval for peripheral neuropathy.        Techniques: Motor conduction studies were done with surface recording electrodes. Sensory conduction studies were performed with surface electrodes, unless indicated otherwise by (n), designating the use of subdermal recording electrodes. Temperature was monitored and recorded throughout the study. Upper extremities were maintained at a temperature of 32 degrees Centigrade or higher.  Lower extremities were maintained at a temperature of 31degrees Centigrade or higher. EMG was done with a concentric needle electrode.        Results:  The right median antidromic, right ulnar antidromic, right superficial radial sensory, bilateral sural and right superficial peroneal sensory nerve?action potentials were?unobtainable.?The right median, right ulnar, right deep peroneal-EDB and right tibial-AH?motor conduction studies were normal. Needle exam of proximal and distal?right arm and distal right?leg muscles was normal.       Interpretation:  The EMG is abnormal. There is EMG evidence for a?diffuse?sensory ganglionopathy. These findings have not been reported yet as a complication of COVID-19. They are most commonly  seen in a paraneoplastic disorder (anti-Hu, most often associated with lung cancer), Sjogren's syndrome, cancer chemotherapy, pyridoxine intoxication or other often inflammatory disorders. Clinical correlation is recommended.    Francheska Forrest M.D (Clinical Neurophysiology Fellow)    I was present for the entire EMG performed on 7/23/2020 and reported by Dr. Francheska Forrest and was available to take over any critical portions of the exam as needed.  I agree entirely of the report and impression as recorded by Dr. Forrest.    Reno Read MD        Sensory NCS      Nerve / Sites Rec. Site Onset Peak Ref. NP Amp Ref. PP Amp Dist Javier Ref. Temp     ms ms ms  V  V  V cm m/s m/s  C   R MEDIAN - Dig II Anti      Wrist Dig II NR NR  NR 10.0 NR 14 NR 48.0 32.7      Elbow Dig II NR NR  NR  NR    33.3   R ULNAR - Dig V Anti      Wrist Dig V NR NR  NR 8.0 NR 12.5 NR 48.0 33.5   R RADIAL - Snuff      Forearm Snuff NR NR  NR 15.0 NR 12.5 NR 48.0 32.1      Forearm Snuff NR NR 3.00 NR  NR 12.5 NR  31.7   R SURAL - Lat Mall      Calf Ankle NR NR  NR 8.0 NR 14 NR 38.0 29.8   L SURAL - Lat Mall      Calf Ankle NR NR  NR 8.0 NR 14 NR 38.0 30.6   R SUP PERONEAL      Lat Leg Woo NR NR  NR  NR 12.5 NR 38.0 30.4       Motor NCS      Nerve / Sites Rec. Site Lat Ref. Amp Ref. Rel Amp Dist Javier Ref. Dur. Area Temp.     ms ms mV mV % cm m/s m/s ms %  C   R MEDIAN - APB      Wrist APB 3.18 4.40 8.3 5.0 100 8   4.43 100 32.6      Elbow APB 8.13  7.2  87.2 26 52.5 48.0 4.84 86.3 32.6   R ULNAR - ADM      Wrist ADM 2.81 3.50 5.7 5.0 100 8   6.04 100 33.4      B.Elbow ADM 6.67  5.4  93.4 22 57.1 48.0 6.35 102 33.3      A.Elbow ADM 8.65  5.0  86.8 11 55.6 48.0 7.34 95.4 33.4   R DEEP PERONEAL - EDB      Ankle EDB 4.38 6.00 2.7 2.5 100 8   5.26 100 32.2      FibHead EDB 11.61  2.0  75.7 30 41.4 38.0 6.93 79.1 30.8      Pop Fos EDB 14.32  2.1  78.4 12 44.3 38.0 6.72 84 30.7   R TIBIAL - AH      Ankle AH 4.48 6.00 7.8 4.0 100 8   5.47 100 31.4      Pop  Fos AH 14.58  4.5  57.5 39 38.6 38.0 7.14 65.3 31.3       EMG Summary Table     Spontaneous MUAP Recruitment    IA Fib/PSW Fasc H.F. Amp Dur. PPP Pattern   R. DELTOID N None None None N N N N   R. BICEPS N None None None N N N N   R. TRICEPS N None None None N N N N   R. PRON TERES N None None None N N N N   R. FIRST D INTEROSS N None None None N N N N   R. TIB ANTERIOR N None None None N N N N   R. GASTROCN (MED) N None None None N N N N   R. PERON TERTIUS N None None None N N N N                                  Again, thank you for allowing me to participate in the care of your patient.      Sincerely,    Reno Read MD

## 2020-07-23 NOTE — PROGRESS NOTES
Nerve Conduction & EMG Report          Patient:       Hilaria Bonner  Patient ID:    [id number]  Gender:        Female  YOB: 1990  Age:           29 Years 6 Months        History & Examination:  Patient is a 30 y/o female with complaints of pain, numbness, paresthesias and burning sensation affecting bilateral hands and feet. She was diagnosed with COVID-19 in mid April and few weeks later (mid May) developed symptoms. Reports that her right side is affected more than the left. Eval for peripheral neuropathy.        Techniques: Motor conduction studies were done with surface recording electrodes. Sensory conduction studies were performed with surface electrodes, unless indicated otherwise by (n), designating the use of subdermal recording electrodes. Temperature was monitored and recorded throughout the study. Upper extremities were maintained at a temperature of 32 degrees Centigrade or higher.  Lower extremities were maintained at a temperature of 31degrees Centigrade or higher. EMG was done with a concentric needle electrode.        Results:  The right median antidromic, right ulnar antidromic, right superficial radial sensory, bilateral sural and right superficial peroneal sensory nerve?action potentials were?unobtainable.?The right median, right ulnar, right deep peroneal-EDB and right tibial-AH?motor conduction studies were normal. Needle exam of proximal and distal?right arm and distal right?leg muscles was normal.       Interpretation:  The EMG is abnormal. There is EMG evidence for a?diffuse?sensory ganglionopathy. These findings have not been reported yet as a complication of COVID-19. They are most commonly seen in a paraneoplastic disorder (anti-Hu, most often associated with lung cancer), Sjogren's syndrome, cancer chemotherapy, pyridoxine intoxication or other often inflammatory disorders. Clinical correlation is recommended.    Francheska Forrest M.D (Clinical Neurophysiology Fellow)    I  was present for the entire EMG performed on 7/23/2020 and reported by Dr. Francheska Forrest and was available to take over any critical portions of the exam as needed.  I agree entirely of the report and impression as recorded by Dr. Forrest.    Reno Read MD        Sensory NCS      Nerve / Sites Rec. Site Onset Peak Ref. NP Amp Ref. PP Amp Dist Javier Ref. Temp     ms ms ms  V  V  V cm m/s m/s  C   R MEDIAN - Dig II Anti      Wrist Dig II NR NR  NR 10.0 NR 14 NR 48.0 32.7      Elbow Dig II NR NR  NR  NR    33.3   R ULNAR - Dig V Anti      Wrist Dig V NR NR  NR 8.0 NR 12.5 NR 48.0 33.5   R RADIAL - Snuff      Forearm Snuff NR NR  NR 15.0 NR 12.5 NR 48.0 32.1      Forearm Snuff NR NR 3.00 NR  NR 12.5 NR  31.7   R SURAL - Lat Mall      Calf Ankle NR NR  NR 8.0 NR 14 NR 38.0 29.8   L SURAL - Lat Mall      Calf Ankle NR NR  NR 8.0 NR 14 NR 38.0 30.6   R SUP PERONEAL      Lat Leg Woo NR NR  NR  NR 12.5 NR 38.0 30.4       Motor NCS      Nerve / Sites Rec. Site Lat Ref. Amp Ref. Rel Amp Dist Javier Ref. Dur. Area Temp.     ms ms mV mV % cm m/s m/s ms %  C   R MEDIAN - APB      Wrist APB 3.18 4.40 8.3 5.0 100 8   4.43 100 32.6      Elbow APB 8.13  7.2  87.2 26 52.5 48.0 4.84 86.3 32.6   R ULNAR - ADM      Wrist ADM 2.81 3.50 5.7 5.0 100 8   6.04 100 33.4      B.Elbow ADM 6.67  5.4  93.4 22 57.1 48.0 6.35 102 33.3      A.Elbow ADM 8.65  5.0  86.8 11 55.6 48.0 7.34 95.4 33.4   R DEEP PERONEAL - EDB      Ankle EDB 4.38 6.00 2.7 2.5 100 8   5.26 100 32.2      FibHead EDB 11.61  2.0  75.7 30 41.4 38.0 6.93 79.1 30.8      Pop Fos EDB 14.32  2.1  78.4 12 44.3 38.0 6.72 84 30.7   R TIBIAL - AH      Ankle AH 4.48 6.00 7.8 4.0 100 8   5.47 100 31.4      Pop Fos AH 14.58  4.5  57.5 39 38.6 38.0 7.14 65.3 31.3       EMG Summary Table     Spontaneous MUAP Recruitment    IA Fib/PSW Fasc H.F. Amp Dur. PPP Pattern   R. DELTOID N None None None N N N N   R. BICEPS N None None None N N N N   R. TRICEPS N None None None N N N N   R. PRON TERES N  None None None N N N N   R. FIRST D INTEROSS N None None None N N N N   R. TIB ANTERIOR N None None None N N N N   R. GASTROCN (MED) N None None None N N N N   R. PERON TERTIUS N None None None N N N N

## 2020-07-24 DIAGNOSIS — G62.9 POLYNEUROPATHY: ICD-10-CM

## 2020-07-24 DIAGNOSIS — G54.9 SENSORY GANGLIONOPATHY: Primary | ICD-10-CM

## 2020-07-24 RX ORDER — TRAMADOL HYDROCHLORIDE 50 MG/1
50 TABLET ORAL EVERY 6 HOURS PRN
Qty: 20 TABLET | Refills: 0 | Status: SHIPPED | OUTPATIENT
Start: 2020-07-24 | End: 2020-08-14

## 2020-07-27 ENCOUNTER — ALLIED HEALTH/NURSE VISIT (OUTPATIENT)
Dept: NURSING | Facility: CLINIC | Age: 30
End: 2020-07-27
Payer: COMMERCIAL

## 2020-07-27 DIAGNOSIS — E53.8 VITAMIN B12 DEFICIENCY (NON ANEMIC): Primary | ICD-10-CM

## 2020-07-27 PROCEDURE — 96372 THER/PROPH/DIAG INJ SC/IM: CPT

## 2020-07-27 RX ADMIN — CYANOCOBALAMIN 1000 MCG: 1000 INJECTION, SOLUTION INTRAMUSCULAR; SUBCUTANEOUS at 11:28

## 2020-07-28 ENCOUNTER — HOSPITAL ENCOUNTER (OUTPATIENT)
Dept: OCCUPATIONAL THERAPY | Facility: CLINIC | Age: 30
Setting detail: THERAPIES SERIES
End: 2020-07-28
Attending: PHYSICIAN ASSISTANT
Payer: COMMERCIAL

## 2020-07-28 ENCOUNTER — VIRTUAL VISIT (OUTPATIENT)
Dept: NEUROLOGY | Facility: CLINIC | Age: 30
End: 2020-07-28
Attending: PSYCHIATRY & NEUROLOGY
Payer: COMMERCIAL

## 2020-07-28 DIAGNOSIS — G54.9 SENSORY GANGLIONOPATHY: Primary | ICD-10-CM

## 2020-07-28 DIAGNOSIS — G54.9 SENSORY GANGLIONOPATHY: ICD-10-CM

## 2020-07-28 PROCEDURE — 97166 OT EVAL MOD COMPLEX 45 MIN: CPT | Mod: GO | Performed by: OCCUPATIONAL THERAPIST

## 2020-07-28 PROCEDURE — 99000 SPECIMEN HANDLING OFFICE-LAB: CPT | Performed by: PSYCHIATRY & NEUROLOGY

## 2020-07-28 PROCEDURE — 86255 FLUORESCENT ANTIBODY SCREEN: CPT | Mod: 90 | Performed by: PSYCHIATRY & NEUROLOGY

## 2020-07-28 PROCEDURE — 83519 RIA NONANTIBODY: CPT | Mod: 90 | Performed by: PSYCHIATRY & NEUROLOGY

## 2020-07-28 PROCEDURE — 36415 COLL VENOUS BLD VENIPUNCTURE: CPT | Performed by: PSYCHIATRY & NEUROLOGY

## 2020-07-28 PROCEDURE — 83520 IMMUNOASSAY QUANT NOS NONAB: CPT | Mod: 90 | Performed by: PSYCHIATRY & NEUROLOGY

## 2020-07-28 PROCEDURE — 97530 THERAPEUTIC ACTIVITIES: CPT | Mod: GO | Performed by: OCCUPATIONAL THERAPIST

## 2020-07-28 NOTE — LETTER
7/28/2020       RE: Hilaria Bonner  [address]  [address]  [facility] [address]     Dear Colleague,    Thank you for referring your patient, Hilaria Bonner, to the [facility] NEUROSPECIALTIES at [facility] Please see a copy of my visit note below.    Chief Complaint: Numbness, pain, loss of limb funciton     History of Present Illness:    Hilaria Bonner is a 29 year old woman who we are seeing at the request of Dr Sher for neuropathy evaluation. In April 2020 she developed confirm COVID infection. About 4 weeks later her neurologic symptoms began. Her first symptom was tingling in her hands where it then  progressed to a burning in her hands about 4-5 days after the numbness.  It then quickly progressed to involving bilateral legs below the knee and then her feet. She felt weak in her hands and feet. Fine motor tasks and gait became impaired. She also felt that her speech became periodically slurred. Weakness affected her diffusely, although hard to know if weakness was from power failure or fatigue. The symptoms began rather abruptly, but have continued to worsen even over the last few weeks. The sensory symptoms are most problematic in her hands and feet. She describes allodynia in these josee: water of all temperatures (cold and hot) hurts.  She now has trouble with day to day tasks that require her to use her hands, like cooking and laundry. She has trouble picking up objects because she has to watch herself  objects.   Hand function is limited by pain, but also numbness and clumsiness. It is difficult for her to care for her children. She relies on her mom for help with daily household tasks and bathing. This was never the case before she became ill in April.  Gait and balance are affected. She is walking with a walker.       Current pain management medications include lyrica 150 mg three times a day as well as cymbalta.                                                                                   Prior pertinent laboratory work-up:  2020:  ANH 1:160. Vitamin B1 low (45). B12 low/normal (285). Negative/normal double stranded DNA, ANCA, C-reactive, RF, GM1, GD1, Gq1b, vitamin A, B6, Vit E, SSA, SSB undetectable.  2020 CSF: 0 WBC, 0 RBC,protein 58 glucose 29.  2020: Normal Vitamin B1, B12, folate       Prior electrophysiologic work-up:  20 NCS/EMG showed all absent upper and lower limb SNAPS and all normal upper and lower limb motor responses.      Prior pertinent imaging work-up:  : MRI brain with and without contrast was essentially normal  : MRI C spine with and without contrast showed no abnormal enhancement in the spinal cord, thecal sac or cervical vertebrae. No spinal canal or neural foraminal narrowing.    Past Medical History:   Past Medical History:   Diagnosis Date     Acute kidney injury (H) 2019     Cardiac arrest (H) 2019     Earache or other ear, nose, or throat complaint 12/15    tyroid     Pulmonary embolus (H) 2019     Past Surgical History:  Past Surgical History:   Procedure Laterality Date     GYN SURGERY      2 C-sections     KNEE SURGERY  most recently - [id number]    3 surgeries in [place]     LAPAROSCOPIC GASTRIC SLEEVE N/A 3/26/2019    Procedure: Laparoscopic Sleeve Gastrectomy;  Surgeon: Luan Lopez MD;  Location: [facility] OR     ORTHOPEDIC SURGERY      2 knee meniscus surgery     Family history:    There is no known family history of hereditary neuropathies or other neuromuscular disorders.     Social History:    Social History     Tobacco Use     Smoking status: Former Smoker     Years: 1.00     Start date: 2016     Last attempt to quit: 2018     Years since quittin.5     Smokeless tobacco: Never Used   Substance Use Topics     Alcohol use: Not Currently     Alcohol/week: 0.0 standard drinks     Comment: occasional     Drug use: No      Medical Allergies:   No Known Allergies     Current  Medications:    Current Outpatient Medications:      calcium Citrate-vitamin D 500-400 MG-UNIT CHEW, Take 1 chew tab by mouth 3 times daily, Disp: 270 tablet, Rfl: 3     cholecalciferol 50 MCG (2000 UT) tablet, Take 1 tablet by mouth daily, Disp: 90 tablet, Rfl: 3     DULoxetine (CYMBALTA) 30 MG capsule, Take 2 capsules (60 mg) by mouth daily, Disp: 60 capsule, Rfl: 5     folic acid (FOLVITE) 1 MG tablet, Take 1 tablet (1 mg) by mouth daily, Disp: 30 tablet, Rfl: 11     hydrOXYzine (ATARAX) 25 MG tablet, Take 1-2 tablets (25-50 mg) by mouth every 6 hours as needed for other (adjuvant pain), Disp: 150 tablet, Rfl: 0     lisinopril (ZESTRIL) 10 MG tablet, Take 1 tablet (10 mg) by mouth daily, Disp: 90 tablet, Rfl: 1     melatonin 1 MG TABS tablet, Take 1 tablet (1 mg) by mouth nightly as needed for sleep, Disp:  , Rfl:      Multiple Vitamins-Minerals (MULTIVITAMIN ADULTS) TABS, Take 1 tablet by mouth daily, Disp: , Rfl:      phenylephrine-shark liver oil-mineral oil-petrolatum (PREPARATION H) 0.25-14-74.9 % rectal ointment, Place rectally 2 times daily as needed for hemorrhoids, Disp:  , Rfl:      polyethylene glycol (MIRALAX) 17 GM/SCOOP powder, Take 17 g (1 capful) by mouth daily, Disp: 850 g, Rfl: 3     pregabalin (LYRICA) 150 MG capsule, Take 1 capsule (150 mg) by mouth 3 times daily, Disp: 90 capsule, Rfl: 3     rivaroxaban ANTICOAGULANT (XARELTO ANTICOAGULANT) 20 MG TABS tablet, Take 1 tablet (20 mg) by mouth daily (with dinner), Disp: 90 tablet, Rfl: 3     traMADol (ULTRAM) 50 MG tablet, Take 1 tablet (50 mg) by mouth every 6 hours as needed for moderate pain, Disp: 20 tablet, Rfl: 0     vitamin (B COMPLEX) tablet, Take 1 tablet by mouth daily, Disp: 90 tablet, Rfl: 3     vitamin B1 (THIAMINE) 100 MG tablet, Take 1 tablet (100 mg) by mouth daily (Patient taking differently: Take 250 mg by mouth daily ), Disp: 30 tablet, Rfl: 0     vitamin C (ASCORBIC ACID) 1000 MG TABS, Take 1 tablet (1,000 mg) by mouth daily,  Disp: 30 tablet, Rfl: 0     vitamin D3 (CHOLECALCIFEROL) 2000 units (50 mcg) tablet, Take 1 tablet (2,000 Units) by mouth daily, Disp: 30 tablet, Rfl: 0    Current Facility-Administered Medications:      cyanocobalamin injection 1,000 mcg, 1,000 mcg, Intramuscular, Q30 Days, Crissy Madrigal PA-C, 1,000 mcg at 07/27/20 1128     medroxyPROGESTERone (DEPO-PROVERA) syringe 150 mg, 150 mg, Intramuscular, Q90 Days, Bertha Montano NP, 150 mg at 03/18/20 1151    Review of Systems: A complete review of systems was obtained and was negative except for what was noted above.     Physical examination:    General: NAD  Patient is alert, attentive, and oriented x 3.  Language is coherent and fluent without aphasia. Memory, comprehension and ability to follow commands were intact. No dysarthria.       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  The temporal relationship to recent infection and evolution/pattern of symptoms does make an autoimmune mechanism the most likely at this time.  Although we note that she has a history of gastric sleeve procedure in 2019 and some nutritional deficiencies at the time of symptom onset, we think a nutritional neuropathy is an unlikely explanation for her current condition.  Though her symptoms have persisted it is difficult to parse out if this is from the initial insult or if she will have a progressive course. We discussed that we would like some further testing and information and from that information will discuss if we should proceed with any specific therapy as outlined below.     Plan:      1. Labs: FGFR-3 and TS-HDS. Also will check serum IF, Hu antibody and CV2 antibody.   2. Will have her return to electrodiagnostics laboratory to confirm absence of sensory responses  3.  When she returns for electrophysiologic confirmation will performed focused neurologic examination  4. If examination and NCS confirm non-length dependant sensory neuropathy or ganglionopathy then will initiate course of immunotherapy (IVIG). Briefly discussed IVIG today. Will discuss in greater detail pending completed assessments as above.  5. Will arrange above testing on Monday August 3.    Seen and discussed with Dr. Chua. His addendum follows.     Darius Parker   PGY-5 Neurology  Clinical Neurophysiology Fellow  ----    ADDENDUM:   I personally interviewed and examined the patient. I agree with the history, physical, assessment, and plan as documented above. Hilaria Bonner is a 29 year old woman who developed an acute onset, non-length dependant sensory neuropathy or ganglionopathy. The NCS provide objective evidence of localization to the peripheral portions of the sensory nervous system, with the most likely localization being the DRG. The evolution and pattern of the neuropathy or neuronopathy is strongly suggestive of an immune mediated mechanism. The onset of the neuropathy relative to the timing of COVID19 highly suggests that COVID19 triggered the presumed immune mediated disorder in a similar way that other antecedent infections are known to trigger Guillain-[place] Syndrome. What is more difficult to understand is if Ms. Bonner condition is monophasic akin to GBS or if it has a chronic/progressive course. Differentiating these conditions is of high importance, as in the former management is support and in the latter immunotherapy plays a critical role. Considering that she reports that her symptoms have worsened over the last several weeks an active immune mediated process is suspected, and hence initiation of immunotherapy is indicated. We will proceed with the lab work and confirmatory electrophysiologic studies as detailed above. When she returns next week for the NCS I will also perform a focused  "neurologic examination and collect inflammatory neuropathy outcome measures (TUG, I-RODS,  strength). Provided no surprises with the clinical or electrophysiologic testing I anticipate initiating IVIG. We also discussed prognosis today. Assuming that the mechanism of injury is as discussed above, even with immunotherapy recovery will take time and may not be complete (especially if the DRG is the primary site of injury). We should get a better idea of prognosis and residuals over the upcoming months. Finally, while I do not think that her neuropathy is secondary to a nutritional deficiency I do understand that she is at risk for these and was found to have some low level nutritional values during her recent hospitalization. Optimization of nutrition as well as other supportive interventions (eg pain management) are important collateral interventions independent of the primary strategy as detailed above. All questions answered.      Travon Chua MD      Hilaria Bonnre is a 29 year old female who is being evaluated via a telephone video visit.  We tried to conenct by video but could not make the technology connect.     The patient has been notified of following:     \"This telephone visit will be conducted via a call between you and your physician/provider. We have found that certain health care needs can be provided without the need for an in-person physical exam.  This service lets us provide the care you need with a telephone conversation.  If a prescription is necessary we can send it directly to your pharmacy.  If lab work is needed we can place an order for that and you can then stop by our lab to have the test done at a later time.    Telephone visits are billed at different rates depending on your insurance coverage.  Please reach out to your insurance provider with any questions.    If during the course of the call the physician/provider feels a video visit is not appropriate, you will not be charged for " "this service.\"    Patient has given verbal consent for Telephone visit? Yes  How would you like to obtain your AVS? MyChart  Will anyone else be joining your telephone visit? Yes: mom. How would they like to receive their invitation?    Visit Details    Type of service:  Telephone Visit    Duration of call: 55 minutes    Originating Location (pt. Location): Home    Distant Location (provider location):  [email or web address]] NEUROSPECIALTIES     See my separate note from today's date for documentation of the virtual visit.     Travon Chua MD  Department of Neurology  "

## 2020-07-28 NOTE — DISCHARGE INSTRUCTIONS
"OT Instructions 7/28/2020:  1.  Place post-it notes with numbers 1-10 in random order on a door/wall.  With right hand, touch in number in order (1,2,3,etc) as fast as you can.  Repeat with left hand.  Switch order of numbers on door and repeat with both hands.  Perform 1-2x/day.  2.  Make \"OK\" sign with right hand.  Close eyes.  Then, have each finger touch thumb to make the \"o\" as fast as you can.  Repeat with left hand.   3.  Take a cotton ball or soft material (silk scarf) and gently, slowly rub all fingers (palm and back of hands) and feet (bottom and top) and from ankles to knees.   "

## 2020-07-28 NOTE — IP AVS SNAPSHOT
"                  MRN:[id number]                      After Visit Summary   7/28/2020    Hilaria Bonner    MRN: [id number]           Visit Information        Provider Department      7/28/2020 12:45 PM Chen Oleary, OT [facility]        Your next 10 appointments already scheduled    Jul 28, 2020  2:00 PM CDT  LAB with LAB FIRST FLOOR [facility] ([facility]) [address] [facility] [address] [phone]   Please do not eat 10-12 hours before your appointment if you are coming in fasting for labs on lipids, cholesterol, or glucose (sugar). Does not apply to pregnant women. Water, tea and black coffee (with nothing added) is okay. Do not drink other fluids, diet soda or gum. If you have concerns about taking your medications, please send a message by clicking on Secure Messaging, Message Your Care Team.     Jul 28, 2020  3:00 PM CDT  Video Visit with Travon Chua MD  [facility] ([facility]) [address] [place] [id number] [phone]   [facility]  Note: this is not an onsite visit; there is no need to come to the facility.  Please have a list of all current medications available for appointment.      Aug 04, 2020  9:15 AM CDT  Neuro Eval with Ena Alicia PT  [facility] ([facility]) [address] [facility] [address] [phone]           Further instructions from your care team       OT Instructions 7/28/2020:  1.  Place post-it notes with numbers 1-10 in random order on a door/wall.  With right hand, touch in number in order (1,2,3,etc) as fast as you can.  Repeat with left hand.  Switch order of numbers on door and repeat with both hands.  Perform 1-2x/day.  2.  Make \"OK\" sign with right hand.  Close eyes.  Then, have each finger touch thumb to make the \"o\" as fast as you can.  Repeat with left hand.   3.  " Take a cotton ball or soft material (silk scarf) and gently, slowly rub all fingers (palm and back of hands) and feet (bottom and top) and from ankles to knees.     [employer] Information    [employer] gives you secure access to your electronic health record. If you see a primary care provider, you can also send messages to your care team and make appointments. If you have questions, please call your primary care clinic.  If you do not have a primary care provider, please call [phone] and they will assist you.       Care EveryWhere ID    This is your Care EveryWhere ID. This could be used by other organizations to access your [place] medical records  CID-[id number]       Equal Access to Services    JERMEÍAS LIGHT : Danielito Katz, cortney renae, laura schumacher. So [facility] [phone].    ATENCIÓN: Si habla español, tiene a elizabeth disposición servicios gratuitos de asistencia lingüística. Llame al [phone].    We comply with applicable federal and state civil rights laws, including the [place] Human Rights Act. We do not discriminate on the basis of race, color, creed, [place], national origin, marital status, age, disability, sex, sexual orientation, or gender identity.

## 2020-07-28 NOTE — PROGRESS NOTES
"Hilaria Bonner is a 29 year old female who is being evaluated via a telephone video visit.  We tried to conenct by video but could not make the technology connect.     The patient has been notified of following:     \"This telephone visit will be conducted via a call between you and your physician/provider. We have found that certain health care needs can be provided without the need for an in-person physical exam.  This service lets us provide the care you need with a telephone conversation.  If a prescription is necessary we can send it directly to your pharmacy.  If lab work is needed we can place an order for that and you can then stop by our lab to have the test done at a later time.    Telephone visits are billed at different rates depending on your insurance coverage.  Please reach out to your insurance provider with any questions.    If during the course of the call the physician/provider feels a video visit is not appropriate, you will not be charged for this service.\"    Patient has given verbal consent for Telephone visit? Yes  How would you like to obtain your AVS? MyChart  Will anyone else be joining your telephone visit? Yes: mom. How would they like to receive their invitation?    Visit Details    Type of service:  Telephone Visit    Duration of call: 55 minutes    Originating Location (pt. Location): Home    Distant Location (provider location):  [facility]     See my separate note from today's date for documentation of the virtual visit.     Travon Chua MD  Department of Neurology          "

## 2020-07-28 NOTE — PROGRESS NOTES
Chief Complaint: Numbness, pain, loss of limb funciton     History of Present Illness:    Hilaria Bonner is a 29 year old woman who we are seeing at the request of Dr Sher for neuropathy evaluation. In April 2020 she developed confirm COVID infection. About 4 weeks later her neurologic symptoms began. Her first symptom was tingling in her hands where it then  progressed to a burning in her hands about 4-5 days after the numbness.  It then quickly progressed to involving bilateral legs below the knee and then her feet. She felt weak in her hands and feet. Fine motor tasks and gait became impaired. She also felt that her speech became periodically slurred. Weakness affected her diffusely, although hard to know if weakness was from power failure or fatigue. The symptoms began rather abruptly, but have continued to worsen even over the last few weeks. The sensory symptoms are most problematic in her hands and feet. She describes allodynia in these josee: water of all temperatures (cold and hot) hurts.  She now has trouble with day to day tasks that require her to use her hands, like cooking and laundry. She has trouble picking up objects because she has to watch herself  objects.   Hand function is limited by pain, but also numbness and clumsiness. It is difficult for her to care for her children. She relies on her mom for help with daily household tasks and bathing. This was never the case before she became ill in April.  Gait and balance are affected. She is walking with a walker.       Current pain management medications include lyrica 150 mg three times a day as well as cymbalta.                                                                                  Prior pertinent laboratory work-up:  5/2020:  ANH 1:160. Vitamin B1 low (45). B12 low/normal (285). Negative/normal double stranded DNA, ANCA, C-reactive, RF, GM1, GD1, Gq1b, vitamin A, B6, Vit E, SSA, SSB undetectable.  6/2020 CSF: 0 WBC, 0 RBC,protein  58 glucose 29.  2020: Normal Vitamin B1, B12, folate       Prior electrophysiologic work-up:  20 NCS/EMG showed all absent upper and lower limb SNAPS and all normal upper and lower limb motor responses.      Prior pertinent imaging work-up:  : MRI brain with and without contrast was essentially normal  : MRI C spine with and without contrast showed no abnormal enhancement in the spinal cord, thecal sac or cervical vertebrae. No spinal canal or neural foraminal narrowing.    Past Medical History:   Past Medical History:   Diagnosis Date     Acute kidney injury (H) 2019     Cardiac arrest (H) 2019     Earache or other ear, nose, or throat complaint 12/15    tyroid     Pulmonary embolus (H) 2019     Past Surgical History:  Past Surgical History:   Procedure Laterality Date     GYN SURGERY      2 C-sections     KNEE SURGERY  most recently - [id number]    3 surgeries in [place]     LAPAROSCOPIC GASTRIC SLEEVE N/A 3/26/2019    Procedure: Laparoscopic Sleeve Gastrectomy;  Surgeon: Luan Lopez MD;  Location: [facility] OR     ORTHOPEDIC SURGERY      2 knee meniscus surgery     Family history:    There is no known family history of hereditary neuropathies or other neuromuscular disorders.     Social History:    Social History     Tobacco Use     Smoking status: Former Smoker     Years: 1.00     Start date: 2016     Last attempt to quit: 2018     Years since quittin.5     Smokeless tobacco: Never Used   Substance Use Topics     Alcohol use: Not Currently     Alcohol/week: 0.0 standard drinks     Comment: occasional     Drug use: No      Medical Allergies:   No Known Allergies     Current Medications:    Current Outpatient Medications:      calcium Citrate-vitamin D 500-400 MG-UNIT CHEW, Take 1 chew tab by mouth 3 times daily, Disp: 270 tablet, Rfl: 3     cholecalciferol 50 MCG (2000) tablet, Take 1 tablet by mouth daily, Disp: 90 tablet, Rfl: 3     DULoxetine (CYMBALTA) 30 MG  capsule, Take 2 capsules (60 mg) by mouth daily, Disp: 60 capsule, Rfl: 5     folic acid (FOLVITE) 1 MG tablet, Take 1 tablet (1 mg) by mouth daily, Disp: 30 tablet, Rfl: 11     hydrOXYzine (ATARAX) 25 MG tablet, Take 1-2 tablets (25-50 mg) by mouth every 6 hours as needed for other (adjuvant pain), Disp: 150 tablet, Rfl: 0     lisinopril (ZESTRIL) 10 MG tablet, Take 1 tablet (10 mg) by mouth daily, Disp: 90 tablet, Rfl: 1     melatonin 1 MG TABS tablet, Take 1 tablet (1 mg) by mouth nightly as needed for sleep, Disp:  , Rfl:      Multiple Vitamins-Minerals (MULTIVITAMIN ADULTS) TABS, Take 1 tablet by mouth daily, Disp: , Rfl:      phenylephrine-shark liver oil-mineral oil-petrolatum (PREPARATION H) 0.25-14-74.9 % rectal ointment, Place rectally 2 times daily as needed for hemorrhoids, Disp:  , Rfl:      polyethylene glycol (MIRALAX) 17 GM/SCOOP powder, Take 17 g (1 capful) by mouth daily, Disp: 850 g, Rfl: 3     pregabalin (LYRICA) 150 MG capsule, Take 1 capsule (150 mg) by mouth 3 times daily, Disp: 90 capsule, Rfl: 3     rivaroxaban ANTICOAGULANT (XARELTO ANTICOAGULANT) 20 MG TABS tablet, Take 1 tablet (20 mg) by mouth daily (with dinner), Disp: 90 tablet, Rfl: 3     traMADol (ULTRAM) 50 MG tablet, Take 1 tablet (50 mg) by mouth every 6 hours as needed for moderate pain, Disp: 20 tablet, Rfl: 0     vitamin (B COMPLEX) tablet, Take 1 tablet by mouth daily, Disp: 90 tablet, Rfl: 3     vitamin B1 (THIAMINE) 100 MG tablet, Take 1 tablet (100 mg) by mouth daily (Patient taking differently: Take 250 mg by mouth daily ), Disp: 30 tablet, Rfl: 0     vitamin C (ASCORBIC ACID) 1000 MG TABS, Take 1 tablet (1,000 mg) by mouth daily, Disp: 30 tablet, Rfl: 0     vitamin D3 (CHOLECALCIFEROL) 2000 units (50 mcg) tablet, Take 1 tablet (2,000 Units) by mouth daily, Disp: 30 tablet, Rfl: 0    Current Facility-Administered Medications:      cyanocobalamin injection 1,000 mcg, 1,000 mcg, Intramuscular, Q30 Days, Crissy Madrigal,  KATTY, 1,000 mcg at 07/27/20 1128     medroxyPROGESTERone (DEPO-PROVERA) syringe 150 mg, 150 mg, Intramuscular, Q90 Days, Bertha Montano NP, 150 mg at 03/18/20 1151    Review of Systems: A complete review of systems was obtained and was negative except for what was noted above.     Physical examination:    General: NAD  Patient is alert, attentive, and oriented x 3.  Language is coherent and fluent without aphasia. Memory, comprehension and ability to follow commands were intact. No dysarthria.       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  The temporal relationship to recent infection and evolution/pattern of symptoms does make an autoimmune mechanism the most likely at this time.  Although we note that she has a history of gastric sleeve procedure in 2019 and some nutritional deficiencies at the time of symptom onset, we think a nutritional neuropathy is an unlikely explanation for her current condition.  Though her symptoms have persisted it is difficult to parse out if this is from the initial insult or if she will have a progressive course. We discussed that we would like some further testing and information and from that information will discuss if we should proceed with any specific therapy as outlined below.     Plan:      1. Labs: FGFR-3 and TS-HDS. Also will check serum IF, Hu antibody and CV2 antibody.   2. Will have her return to electrodiagnostics laboratory to confirm absence of sensory responses  3. When she returns for electrophysiologic confirmation will performed focused neurologic examination  4. If examination and NCS confirm non-length dependant sensory neuropathy or ganglionopathy then will initiate course of immunotherapy (IVIG). Briefly discussed IVIG today. Will discuss in greater  detail pending completed assessments as above.  5. Will arrange above testing on Monday August 3.    Seen and discussed with Dr. Chua. His addendum follows.     Darius Parker   PGY-5 Neurology  Clinical Neurophysiology Fellow  ----    ADDENDUM:   I personally interviewed and examined the patient. I agree with the history, physical, assessment, and plan as documented above. Hilaria Bonner is a 29 year old woman who developed an acute onset, non-length dependant sensory neuropathy or ganglionopathy. The NCS provide objective evidence of localization to the peripheral portions of the sensory nervous system, with the most likely localization being the DRG. The evolution and pattern of the neuropathy or neuronopathy is strongly suggestive of an immune mediated mechanism. The onset of the neuropathy relative to the timing of COVID19 highly suggests that COVID19 triggered the presumed immune mediated disorder in a similar way that other antecedent infections are known to trigger Guillain-Rockford Syndrome. What is more difficult to understand is if Ms. Bonner condition is monophasic akin to GBS or if it has a chronic/progressive course. Differentiating these conditions is of high importance, as in the former management is support and in the latter immunotherapy plays a critical role. Considering that she reports that her symptoms have worsened over the last several weeks an active immune mediated process is suspected, and hence initiation of immunotherapy is indicated. We will proceed with the lab work and confirmatory electrophysiologic studies as detailed above. When she returns next week for the NCS I will also perform a focused neurologic examination and collect inflammatory neuropathy outcome measures (TUG, I-RODS,  strength). Provided no surprises with the clinical or electrophysiologic testing I anticipate initiating IVIG. We also discussed prognosis today. Assuming that the mechanism of injury is as discussed  above, even with immunotherapy recovery will take time and may not be complete (especially if the DRG is the primary site of injury). We should get a better idea of prognosis and residuals over the upcoming months. Finally, while I do not think that her neuropathy is secondary to a nutritional deficiency I do understand that she is at risk for these and was found to have some low level nutritional values during her recent hospitalization. Optimization of nutrition as well as other supportive interventions (eg pain management) are important collateral interventions independent of the primary strategy as detailed above. All questions answered.      Travon Chua MD    ADDENDUM:   8/3/20:     General Appearance: NAD    Skin: There are no rashes or other skin lesions.    Musculoskeletal:  There is no scoliosis, lordosis, kyphosis, pes cavus, or hammertoes.    Neurologic examination:    Mental status:  Patient is alert, attentive, and oriented x 3.  Language is coherent and fluent without aphasia.  Memory, comprehension and ability to follow commands were intact.       Cranial nerves:   Pupils were round and reacted to light.  Extraocular movements were full. There was no face, jaw, palate or tongue weakness or atrophy. Hearing was grossly intact.  Shoulder shrug was normal.       Motor exam: No atrophy or fasciculations.   Manual muscle testing revealed the following MRC grade muscle power:   Right Left   Neck flexion 5    Neck extension: 5    Shoulder abduction:  5 5   Elbow extension: 5 5   Elbow flexion:  5 5   Wrist flexion:  5 5   Wrist extension:  5 5   FDI 4 4   Hip flexion 5 5   Knee flexion 5 5   Knee extension 5 5   Dorsiflexion 5 5   Plantar flexion 5 5     Complex motor skills: No tremor but there is moderate ataxia with FNF    Sensory exam: Reduced pin throughout arms and legs. Vibration reduced in the fingers, ankles and toes.      Gait: Wide based, ataxic    Deep tendon reflexes:   Right Left   Triceps 2  2   Biceps 1 1   Brachioradialis 2 2   Knee jerk 0 0   Ankle jerk 0 0   Plantar responses were flexor bilaterally.      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 Will start IVIG as above. Discussed risks and benefits. Will continue for 3 months. If no benefit at that time will stop and re-explore diagnosis.        ADDENDUM:   Labs dated 8/3/20 showed TS-HDS mildly elevated at [id number] ([id number]). Negative FGFR3 and GD1a.

## 2020-07-29 NOTE — PROGRESS NOTES
07/28/20 1300   Quick Adds   Quick Adds Certification   Type of Visit Initial Outpatient Occupational Therapy Evaluation       Present No   General Information   Start Of Care Date 07/28/20   Referring Physician Crissy Madrigal PA-C   Orders Evaluate and treat as indicated   Orders Date 06/19/20   Medical Diagnosis H/o massive PE and cardiac arrest in 2019, ARAMIS, obesity, COVI-19 in April of 2020.  Pt presents with decreased cognition, weakness, neuropathy of hands and feet and unsteadiness.   Onset of Illness/Injury or Date of Surgery 04/16/20   Surgical/Medical History Reviewed Yes   Additional Occupational Profile Info/Pertinent History of Current Problem Pt is not alone during the day: has help as needed from mom, dad, children's father or 10 year old son.  pt has a walker and uses, occassionally.   Comments/Observations Pt here with 10 year old son.   Role/Living Environment   Current Community Support Family/friend caregiver  (has 3 and 10 year old)   Patient role/Employment history Other/comments  (on medical leave)   Community/Avocational Activities cook, watch TV, play with kids, bowling   Current Living Environment Apartment/condo   Number of Stairs to Enter Home 8   Number of Stairs Within Home 0   Primary Bathroom Location/Comments tub/shower combo   Additional Bathroom Set Up/Equipment Shower/tub chair   Home/Community Accessibility Comments at parents house; 8 to enter and 8 steps to main level-pt there75% of the time   Prior Level - Transfers Independent   Prior Level - Ambulation Assistive equipment  (has good/bad days and then has to use walker)   Prior Responsibilities - IADL Meal Preparation;Housekeeping;Laundry;Shopping;Medication management;Finances;Driving;Work   Prior Level Comments Currently, pt drives only if another person is in the car with her, pt grocery shopping with another person, mom assists makeda espinoza, son does laundry   Current Assistive Devices -  Mobility Front wheeled walker   Role/Living Environment Comments parents help out most days   Patient/family 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, less pain in hands, return to cooking   Pain   Patient currently in pain Yes   Pain location fingers, knees to toes-bilaterally   Pain rating 10/10   Pain description Burning   Cognitive Status Examination   Orientation Orientation to person, place and time   Visual Perception   Visual Perception Comments sees double when tired   Posture   Posture Not impaired   Range of Motion (ROM)   ROM Comments BUE AROM WNL for all joints to wrist, decreased finger opposition due to decreased FMC   Hand Strength   Hand Dominance Right   Left Hand  (pounds) 14 pounds  (greater than 3 SD below mean score for age)   Right Hand  (pounds) 8 pounds  (greater than 3 SD below mean score for age)   Left Three Point Pinch (pounds) 6 pounds  (greater than 3 SD below mean score for age)   Right Three Point Pinch (pounds) 6 pounds  (greater than 3 SD below mean score for age)   Coordination   Upper Extremity Coordination Left UE impaired;Right UE impaired   Gross Motor Coordination decreased coordination with finger to nose task wtih each right/left arm   Fine Motor Coordination difficulty with finger opposition of all fingers in sequence of right/left hands-vision occluded.   Right Hand, Nine Hole Peg Test (seconds) 1.02.56  (> 2 SD slower than mean for age, lost control of 3 pegs)   Left Hand, Box and Blocks Test (cubes transferred in 1 minute) 1.03.16  (greater than 2 SD slower than mean for age)   Functional Mobility   Ambulation IND but slow and takes deliberate steps   Functional Mobility Comments neuropathy in feet make walking difficult   Bathing   Level of [place] - Bathing minimum assist (75% patients effort)   Physical Assist/Nonphysical Assist - Bathing 1 person assist  (and shower chair)   Upper Body Dressing   Level of [place]: Dress Upper Body minimum assist  (75% patients effort)   Lower Body Dressing   Level of [place]: Dress Lower Body minimum assist (75% patients effort)   Toileting   Level of [place]: Toilet independent   Grooming   Level of [place]: Grooming independent   Eating/Self-Feeding   Level of [place]: Eating independent   Eating/Self Feeding Comments uses built up handles, does not feel safe using knife due to decreased sensation   Activity Tolerance   Activity Tolerance poor   Planned Therapy Interventions   Planned Therapy Interventions ADL training;IADL training;Neuromuscular re-education;Self care/Home management;Strengthening;Therapeutic activities   Adult OT Eval Goals   OT Eval Goals (Adult) 1;2;3;4;5    OT Goal 1   Goal Identifier 1   Goal Description Pt will reduce 9-hole peg test score with bilateral hands by at least 10 seconds to improve FMC for I/ADLs.   Target Date 09/22/20    OT Goal 2   Goal Identifier 2   Goal Description Pt will be able to tolerate light brushing of grey foam to both surfaces of hands and feet for one minute due to desensitization to allow for less pain during ADLs.     Target Date 09/22/20    OT Goal 3   Goal Identifier 3   Goal Description Pt will report adherence to, daily, BUE HEP to address strength and endurance at all tx sessions to improve function needed for I/ADLs.    Target Date 09/22/20   OT Goal 4   Goal Identifier 4   Goal Description Pt will report making meal involving at least 3 steps (ie chopping vegetables, cooking rice, mixing ingrediiants together) due to improved strength, endurance, FMC and less pain in hands.    Target Date 09/22/20   OT Goal 5   Goal Identifier 5   Goal Description Pt will verbalize understanding of results of cognitive assessment, functional implications of score and at least 3 activities to improve cognition and at least 3 compensatory strategies as memory is improving for increase success with ADL/IADL.   Target Date 09/22/20   Clinical Impression    Criteria for Skilled Therapeutic Interventions Met Yes, treatment indicated   OT Diagnosis decreased I/ADL performance   Influenced by the following impairments pain in hands and feet, decreased FMC, decreased strength, decreased endurance   Assessment of Occupational Performance 3-5 Performance Deficits   Identified Performance Deficits unable to work, unable to drive, Donnell with dressing, unable to prepare full meals, difficulty caring for children   Clinical Decision Making (Complexity) Moderate complexity   Therapy Frequency 2x/wk x4wks, 1x/wk x4wks   Predicted Duration of Therapy Intervention (days/wks) 8 weeks   Risks and Benefits of Treatment have been explained. Yes   Patient, Family & other staff in agreement with plan of care Yes   Clinical Impression Comments Pt presents with significant pain in hands and feet, generalized weakness and decreased endurance, decreased FMC affecting I/ADLs.  Pt will benefit from continued skilled OT intervention to improve independence, engagement and ease with I/ADLs.    Therapy Certification   Certification date from 07/28/20   Certification date to 09/22/20   Total Evaluation Time   OT Rosa Moderate Complexity Minutes (29736) 40

## 2020-07-29 NOTE — PROGRESS NOTES
[facility]          OUTPATIENT OCCUPATIONAL THERAPY  EVALUATION  PLAN OF TREATMENT FOR OUTPATIENT REHABILITATION  (COMPLETE FOR INITIAL CLAIMS ONLY)  Patient's Last Name, First Name, M.I.  YOB: 1990  Hilaria Bonner                           Provider's Name  [facility] Medical Record No.  [id number]                               Onset Date:     04/16/20   Start of Care Date:     07/28/20   Type:     ___PT   _X_OT   ___SLP Medical Diagnosis:     H/o massive PE and cardiac arrest in 2019, ARAMIS, obesity, COVI-19 in April of 2020.  Pt presents with decreased cognition, weakness, neuropathy of hands and feet and unsteadiness.                          OT Diagnosis:     decreased I/ADL performance Visits from SOC:  1   _________________________________________________________________________________  Plan of Treatment/Functional Goals:  ADL training, IADL training, Neuromuscular re-education, Self care/Home management, Strengthening, Therapeutic activities                    Goals  Goal Identifier: 1  Goal Description: Pt will reduce 9-hole peg test score with bilateral hands by at least 10 seconds to improve FMC for I/ADLs.  Target Date: 09/22/20     Goal Identifier: 2  Goal Description: Pt will be able to tolerate light brushing of grey foam to both surfaces of hands and feet for one minute due to desensitization to allow for less pain during ADLs.    Target Date: 09/22/20     Goal Identifier: 3  Goal Description: Pt will report adherence to, daily, BUE HEP to address strength and endurance at all tx sessions to improve function needed for I/ADLs.   Target Date: 09/22/20     Goal Identifier: 4  Goal Description: Pt will report making meal involving at least 3 steps (ie chopping vegetables, cooking rice, mixing ingrediiants together) due to improved strength, endurance, FMC and less  pain in hands.   Target Date: 09/22/20     Goal Identifier: 5     Target Date: 09/22/20                                              Therapy Frequency: 2x/wk x4wks, 1x/wk x4wks     Predicted Duration of Therapy Intervention (days/wks): 8 weeks  Chen Oleary OT          I CERTIFY THE NEED FOR THESE SERVICES FURNISHED UNDER        THIS PLAN OF TREATMENT AND WHILE UNDER MY CARE     (Physician co-signature of this document indicates review and certification of the therapy plan).                 ,    Certification date from: 07/28/20, Certification date to: 09/22/20               Referring Physician: Crissy Madrigal PA-C     Initial Assessment        See Epic Evaluation      Start Of Care Date: 07/28/20

## 2020-08-01 ENCOUNTER — APPOINTMENT (OUTPATIENT)
Dept: LAB | Facility: CLINIC | Age: 30
End: 2020-08-01
Attending: PSYCHIATRY & NEUROLOGY
Payer: COMMERCIAL

## 2020-08-03 ENCOUNTER — OFFICE VISIT (OUTPATIENT)
Dept: NEUROLOGY | Facility: CLINIC | Age: 30
End: 2020-08-03
Payer: COMMERCIAL

## 2020-08-03 ENCOUNTER — APPOINTMENT (OUTPATIENT)
Dept: LAB | Facility: CLINIC | Age: 30
End: 2020-08-03
Payer: COMMERCIAL

## 2020-08-03 DIAGNOSIS — G62.9 NEUROPATHY: Primary | ICD-10-CM

## 2020-08-03 NOTE — PROGRESS NOTES
[facility]  Electrodiagnostic Laboratory    Nerve Conduction & EMG Report          Patient:       Ha Manrique  Patient ID:    [id number]  Gender:        Female  YOB: 1990  Age:           29 Years 7 Months        History & Examination:  Hilaria Bonner is a 29 year old woman who developed tingling and burning pain ~4 weeks after having a diagnosed COVID infection.  Her symptoms are ongoing.  Evaluation is to check for continued sensory neuropathy.    Techniques: Sensory conduction studies were done with surface recording electrodes.     Results:    Nerve conduction studies:   1. Right ulnar-D5 sensory response shows moderately reduced amplitude and normal CV.  2. Right median-D2, right radial-snuffbox and right sural sensory responses are all absent.      Interpretation:  This is an abnormal study. As was noted on the electrophysiologic study dated 7/23/20 sensory responses are absent. or attenuated in the right upper and lower limbs.    Travon Chua MD  Department of Neurology           Sensory NCS      Nerve / Sites Rec. Site Onset Peak NP Amp Ref. PP Amp Dist Javier Ref. Temp     ms ms  V  V  V cm m/s m/s  C   R MEDIAN - Dig II Anti      Wrist Dig II NR NR NR 10.0 NR 14 NR 48.0    R ULNAR - Dig V Anti      Wrist Dig V 2.60 3.23 4.2 8.0 10.8 12.5 48.0 48.0    R RADIAL - Snuff      Forearm Snuff NR NR NR 15.0 NR 12.5 NR 48.0    R SURAL - Lat Mall      Calf Ankle NR NR NR 8.0 NR 14 NR 38.0

## 2020-08-03 NOTE — LETTER
8/3/2020       RE: Hilaria Bonner  [address]  [address]  [place] [address]     Dear Colleague,    Thank you for referring your patient, Hilaria Bonner, to the [facility] EMG at [facility]. Please see a copy of my visit note below.        [facility]  Electrodiagnostic Laboratory    Nerve Conduction & EMG Report          Patient:       Ha Manrique  Patient ID:    [id number]  Gender:        Female  YOB: 1990  Age:           29 Years 7 Months        History & Examination:  Hilaria Bonner is a 29 year old woman who developed tingling and burning pain ~4 weeks after having a diagnosed COVID infection.  Her symptoms are ongoing.  Evaluation is to check for continued sensory neuropathy.    Techniques: Sensory conduction studies were done with surface recording electrodes.     Results:    Nerve conduction studies:   1. Right ulnar-D5 sensory response shows moderately reduced amplitude and normal CV.  2. Right median-D2, right radial-snuffbox and right sural sensory responses are all absent.      Interpretation:  This is an abnormal study. As was noted on the electrophysiologic study dated 7/23/20 sensory responses are absent. or attenuated in the right upper and lower limbs.    Travon Chua MD  Department of Neurology           Sensory NCS      Nerve / Sites Rec. Site Onset Peak NP Amp Ref. PP Amp Dist Javier Ref. Temp     ms ms  V  V  V cm m/s m/s  C   R MEDIAN - Dig II Anti      Wrist Dig II NR NR NR 10.0 NR 14 NR 48.0    R ULNAR - Dig V Anti      Wrist Dig V 2.60 3.23 4.2 8.0 10.8 12.5 48.0 48.0    R RADIAL - Snuff      Forearm Snuff NR NR NR 15.0 NR 12.5 NR 48.0    R SURAL - Lat Mall      Calf Ankle NR NR NR 8.0 NR 14 NR 38.0              Again, thank you for allowing me to participate in the care of your patient.      Sincerely,    Travon Chua MD

## 2020-08-04 ENCOUNTER — HOSPITAL ENCOUNTER (OUTPATIENT)
Dept: PHYSICAL THERAPY | Facility: CLINIC | Age: 30
Setting detail: THERAPIES SERIES
End: 2020-08-04
Attending: PHYSICIAN ASSISTANT
Payer: COMMERCIAL

## 2020-08-04 LAB
IGA SERPL-MCNC: 156 MG/DL (ref 84–499)
IGG SERPL-MCNC: 1419 MG/DL (ref 610–1616)
IGM SERPL-MCNC: 92 MG/DL (ref 35–242)
MISCELLANEOUS TEST: NORMAL
PROT PATTERN SERPL IFE-IMP: NORMAL

## 2020-08-04 PROCEDURE — 97110 THERAPEUTIC EXERCISES: CPT | Mod: GP | Performed by: PHYSICAL THERAPIST

## 2020-08-04 PROCEDURE — 97116 GAIT TRAINING THERAPY: CPT | Mod: GP | Performed by: PHYSICAL THERAPIST

## 2020-08-04 PROCEDURE — 97162 PT EVAL MOD COMPLEX 30 MIN: CPT | Mod: GP | Performed by: PHYSICAL THERAPIST

## 2020-08-04 NOTE — PROGRESS NOTES
08/04/20 0900   Quick Adds   Quick Adds Certification   Type of Visit Initial OP PT Evaluation   General Information   Start of Care Date 08/04/20   Referring Physician Crisys Madrigal PA-C    Orders Evaluate and Treat as Indicated   Order Date 06/19/20   Medical Diagnosis polyneuropathy, difficulty walking,    Onset of illness/injury or Date of Surgery 05/29/20   Surgical/Medical history reviewed Yes   Pertinent history of current problem (include personal factors and/or comorbidities that impact the POC) Hx of PE and cardiac arrest in 2019. She got COVID-19 and was in the hospital 5/29/20 and then did rehab. She left rehab walking with her walker. She has pretty moderate to severe neuropathy-- had EMG yesterday. Somedays she feels better with her balance and strength. She has had some falls over the past couple months-- they happen pretty suddenly. Trying to walk to walk more around the house in the [facility] but has to make sure she feels safe before she does this so that she doesn't fall. Getting up from the ground is challenging.    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    Fall Risk Screen   Fall screen completed by PT   Have you fallen 2 or more times in the past year? Yes   Have you fallen and had an  "injury in the past year? Yes   Timed Up and Go score (seconds) not tested    Is patient a fall risk? Yes   Fall screen comments she has had several falls since leaving the hospital    Pain   Pain comments knees are painful sometimes, pain in hands and feet (knees down) she has neuropathy and gets pain, bottom of her feet are the worse.    Vitals Signs   Heart Rate 100   Blood Pressure 110/77   Cognitive Status Examination   Orientation orientation to person, place and time   Level of Consciousness alert   Follows Commands and Answers Questions 100% of the time   Personal Safety and Judgment intact   Memory impaired   Cognitive Comment she is working with OT on cognitive tasks    Posture   Posture Comments forward shoulder posture    Range of Motion (ROM)   ROM Comment heel cord and HS tightness    Strength   Strength Comments 4+/5 B ankle DF, hip flexors and HS 3+-4/5 B, decreased core strength and engagement; did 1 sit<>stand from 18'' surface without UEs but took 3 tries before she could stand. Decreased activity tolerance, walking for 5-10 minutes is pretty fatiguing for patient.    Bed Mobility   Bed Mobility Comments she reports that this can be challenging, she needs to use the strength of her arms-- feels some pain in her chest she has had this for > 1 year, not sure why    Transfer Skills   Transfer Comments sit to stand with use of UEs on chair    Gait   Gait Comments ambulates with decreased gait speed and step length, legs are visibly \"wobbly\", forward shoulder posture. no walker used coming into therapy session. with walker that PT had her use for part of session, improved stability and step length.    Gait Special Tests   Gait Special Tests 25 FOOT TIMED WALK;DYNAMIC GAIT INDEX   Gait Special Tests 25 Foot Timed Walk   Seconds 25.15    Steps 22 Steps   Comments without the walker    Gait Special Tests Dynamic Gait Index   Comments short from DGI 5/12, see flowsheet    Balance   Balance Comments reaches " for wall or chair in standing frequently for balance    Balance Special Tests   Balance Special Tests Sit to stand reps   Balance Special Tests Sit to Stand Reps in 30 Seconds   Comments 5 times sit<>stand, 38 secs, raises mat height of ~22 inches    Sensory Examination   Sensory Perception Comments neuropathy from knee distally and in hands; EMG yesterday, very little sensation noted in hands and lower legs.    Planned Therapy Interventions   Planned Therapy Interventions IADL retraining;balance training;gait training;neuromuscular re-education;ROM;strengthening;stretching;transfer training   Clinical Impression   Criteria for Skilled Therapeutic Interventions Met yes, treatment indicated   PT Diagnosis weakness and balance impairments    Influenced by the following impairments neuropathy, weakness, decreased strength, impaired ROM, pain, impaired activity tolerance    Functional limitations due to impairments unable to work as nursing assistant, increased risk of falls, decreased independence with self cares and role as a Mom    Clinical Presentation Evolving/Changing   Clinical Presentation Rationale severe neuropathy, PT impairments, clinical judgement    Clinical Decision Making (Complexity) Moderate complexity   Therapy Frequency 1 time/week   Predicted Duration of Therapy Intervention (days/wks) up to 90 days   Risk & Benefits of therapy have been explained Yes   Patient, Family & other staff in agreement with plan of care Yes   Clinical Impression Comments Patient presents with balance and gait impairments that are limiting her independence with mobility and 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 Patient will benefit from PT in order to improve her balance and strength in order to increase her independent function.    GOALS   PT Eval Goals 1;2;3   Goal 1   Goal Identifier floor<>stand    Goal Description Emily will perform floor<>stand transfer with light support from anterior surface in 2/2 trials with steady  balance upon standing in order to more easily get up and down from ground when playing with kids.    Target Date 10/02/20   Goal 2   Goal Identifier DGI    Goal Description Emily will score a 19 or > on DGI in order to show improved balance for home and community ambulation to prevent falls.    Target Date 10/02/20   Goal 3   Goal Identifier sit<>stand    Goal Description Emily will perform sit<>stand from 18'' surface x 5 rep without UEs in order to show improved LE strength needed for transfers and day to day activites.    Target Date 10/02/20   Total Evaluation Time   PT Eval, Moderate Complexity Minutes (99216) 30   Therapy Certification   Certification date from 08/04/20   Certification date to 11/01/20   Medical Diagnosis polyneuropathy, difficulty walking,    Certification I certify the need for these services furnished under this plan of treatment and while under my care.  (Physician co-signature of this document indicates review and certification of the therapy plan).

## 2020-08-04 NOTE — PROGRESS NOTES
[facility]        OUTPATIENT PHYSICAL THERAPY FUNCTIONAL EVALUATION  PLAN OF TREATMENT FOR OUTPATIENT REHABILITATION  (COMPLETE FOR INITIAL CLAIMS ONLY)  Patient's Last Name, First Name, M.I.  YOB: 1990  Hilaria Bonner        Provider's Name   [facility]   Medical Record No.  [id number]     Start of Care Date:  08/04/20   Onset Date:  05/29/20   Type:     _X__PT   ____OT  ____SLP Medical Diagnosis:  polyneuropathy, difficulty walking,      PT Diagnosis:  weakness and balance impairments  Visits from SOC:  1                              __________________________________________________________________________________  Plan of Treatment/Functional Goals:  IADL retraining, balance training, gait training, neuromuscular re-education, ROM, strengthening, stretching, transfer training           GOALS  floor<>stand   Emily will perform floor<>stand transfer with light support from anterior surface in 2/2 trials with steady balance upon standing in order to more easily get up and down from ground when playing with kids.   10/02/20    DGI   Emily will score a 19 or > on DGI in order to show improved balance for home and community ambulation to prevent falls.   10/02/20    sit<>stand   Emily will perform sit<>stand from 18'' surface x 5 rep without UEs in order to show improved LE strength needed for transfers and day to day activites.   10/02/20             Goal Identifier 6MWT    Goal Description Emily will perform 6MWT with least restrictive gait AD at 1.1 m/s or > in order to keep up with kids when walking to the park or going on errand in the community.    Target Date 11/01/20                 Therapy Frequency:  1 time/week   Predicted Duration of Therapy Intervention:  up to 90 days    Ena Alicia, PT                                    I CERTIFY THE  NEED FOR THESE SERVICES FURNISHED UNDER        THIS PLAN OF TREATMENT AND WHILE UNDER MY CARE     (Physician co-signature of this document indicates review and certification of the therapy plan).                Certification Date From:  08/04/20   Certification Date To:  11/01/20    Referring Provider:  Crissy Madrigal PA-C     Initial Assessment  See Epic Evaluation- Start of Care Date: 08/04/20

## 2020-08-06 ENCOUNTER — TELEPHONE (OUTPATIENT)
Dept: NEUROLOGY | Facility: CLINIC | Age: 30
End: 2020-08-06

## 2020-08-06 DIAGNOSIS — G61.81 CIDP (CHRONIC INFLAMMATORY DEMYELINATING POLYNEUROPATHY) (H): ICD-10-CM

## 2020-08-06 NOTE — TELEPHONE ENCOUNTER
Per Dr. Chua: We are going to start IVIG on this woman. She would like to see if it can be done at home.   2 gm/kg over 3 days and then 1 gm/kg q 3 weeks. Usual premeds.   She also needs follow up with me in 4-6 weeks.   Can you please see if scheduling can get in touch with her about follow up.       Called patient to discuss and she is OK with going through [facility] for IVIG. Called [facility] and they will start benefits investigation and update me.     IVIG therapy plan entered and routed to Dr. Chua for review and signature.     Request sent to Clinic Coordinator to contact pt to schedule in person f/u in 4-6 weeks.

## 2020-08-07 LAB — PNP ABY SERUM: NORMAL

## 2020-08-07 RX ORDER — HEPARIN SODIUM,PORCINE 10 UNIT/ML
5 VIAL (ML) INTRAVENOUS
Status: CANCELLED | OUTPATIENT
Start: 2020-08-07

## 2020-08-07 RX ORDER — DIPHENHYDRAMINE HCL 25 MG
50 CAPSULE ORAL ONCE
Status: CANCELLED | OUTPATIENT
Start: 2020-08-07

## 2020-08-07 RX ORDER — ACETAMINOPHEN 325 MG/1
650 TABLET ORAL ONCE
Status: CANCELLED
Start: 2020-08-07

## 2020-08-07 RX ORDER — HEPARIN SODIUM (PORCINE) LOCK FLUSH IV SOLN 100 UNIT/ML 100 UNIT/ML
5 SOLUTION INTRAVENOUS
Status: CANCELLED | OUTPATIENT
Start: 2020-08-07

## 2020-08-10 ENCOUNTER — HOME INFUSION (PRE-WILLOW HOME INFUSION) (OUTPATIENT)
Dept: PHARMACY | Facility: CLINIC | Age: 30
End: 2020-08-10

## 2020-08-11 LAB — PNP ABY SERUM: NORMAL

## 2020-08-11 NOTE — PROGRESS NOTES
This is a recent snapshot of the patient's [facility] medical record.  For current drug dose and complete information and questions, call [phone] or In Basket pool, [facility] home infusion ([id number])  CSN Number:  [id number]

## 2020-08-12 ENCOUNTER — DOCUMENTATION ONLY (OUTPATIENT)
Dept: PHARMACY | Facility: CLINIC | Age: 30
End: 2020-08-12

## 2020-08-12 ENCOUNTER — HOME INFUSION (PRE-WILLOW HOME INFUSION) (OUTPATIENT)
Dept: PHARMACY | Facility: CLINIC | Age: 30
End: 2020-08-12

## 2020-08-12 NOTE — PROGRESS NOTES
Skilled Nurse visit in the [facility] Infusion Suite to administer Vlswaccc61% 40 gm vial (40GM DOSE).  No recent elevated temperature, fever, chills, productive cough, coughing for 3 weeks or longer or hemoptysis, abnormal vital signs, night sweats, chest pain. No  decrease in your appetite, unexplained weight loss or fatigue.  No other new onset medical symptoms.  Current weight 256.4lbs.  PIV placed left wrist, 3 attempt/s.  Pre medicated with HYDROCORTISONE 100 MG in 10 ML NACL 0.9%, DIPHENHYDRAMINE 25MG CAP, ACETAMINOPHEN 325 MG TAB. Labs drawn NONE. First Loading Dose Infusion completed without complication or reaction.

## 2020-08-13 ENCOUNTER — MYC MEDICAL ADVICE (OUTPATIENT)
Dept: PALLIATIVE MEDICINE | Facility: CLINIC | Age: 30
End: 2020-08-13

## 2020-08-13 ENCOUNTER — DOCUMENTATION ONLY (OUTPATIENT)
Dept: PHARMACY | Facility: CLINIC | Age: 30
End: 2020-08-13

## 2020-08-13 ENCOUNTER — HOME INFUSION (PRE-WILLOW HOME INFUSION) (OUTPATIENT)
Dept: PHARMACY | Facility: CLINIC | Age: 30
End: 2020-08-13

## 2020-08-13 NOTE — TELEPHONE ENCOUNTER
Zia message from patient on 08/13/2020 at 1105:  Hi Janki  I was trying to get an appointment setup due to me still having pain  Can you call me [phone]  ______________________________________    Called Hilaria.  Informed her she can either make a virtual or in clinic appointment with Janki Goodman.  Which ever one she would like to do.  Pt will contact her mom to see if she is available to drive her in for an appointment.  She will call us back to schedule a follow up with Janki Goodman.      Stephenie Perez RN  Care Coordinator  [facility]

## 2020-08-13 NOTE — PROGRESS NOTES
Skilled Nurse visit in the  patient home to administer Prigiven.  No recent elevated temperature, fever, chills, productive cough, coughing for 3 weeks or longer or hemoptysis, abnormal vital signs, night sweats, chest pain. No  decrease in your appetite, unexplained weight loss or fatigue.  No other new onset medical symptoms.  Current weight 256.  PIV remains intact and flushes well from yesterday's placement.  Pre medicated with 650 mg Acetaminophen PO, 50 mg Diphenhydramine PO, and 100 mg Hydrocortisone IV. Infusion completed without complication or reaction. Pt reports therapy has not had a change in managing symptoms related to therapy.    Hannah Mcgraw RN  [facility]  [email or web address]  [phone]

## 2020-08-13 NOTE — PROGRESS NOTES
This is a recent snapshot of the patient's [facility] medical record.  For current drug dose and complete information and questions, call [phone] or In [facility] pool, fv home infusion ([id number])  CSN Number:  [id number]

## 2020-08-14 ENCOUNTER — HOME INFUSION (PRE-WILLOW HOME INFUSION) (OUTPATIENT)
Dept: PHARMACY | Facility: CLINIC | Age: 30
End: 2020-08-14

## 2020-08-14 ENCOUNTER — DOCUMENTATION ONLY (OUTPATIENT)
Dept: PHARMACY | Facility: CLINIC | Age: 30
End: 2020-08-14

## 2020-08-14 DIAGNOSIS — G62.9 POLYNEUROPATHY: ICD-10-CM

## 2020-08-14 RX ORDER — TRAMADOL HYDROCHLORIDE 50 MG/1
50 TABLET ORAL EVERY 6 HOURS PRN
Qty: 20 TABLET | Refills: 0 | Status: SHIPPED | OUTPATIENT
Start: 2020-08-14 | End: 2020-08-20 | Stop reason: ALTCHOICE

## 2020-08-14 NOTE — PROGRESS NOTES
Skilled Nurse visit in the  patient home to administer Privigen (immune globulin 10%) 40 g in 400 mL of diluent (based on a dosage of 2 g/kg, an ideal weight of 62.2 kg, divided over 3 days and rounded to the nearest commercially available vial size) IV via CADD pump over approximately 4 hours.  No recent elevated temperature, fever, chills, productive cough, coughing for 3 weeks or longer or hemoptysis, abnormal vital signs, night sweats, chest pain. No  decrease in your appetite, unexplained weight loss or fatigue.  No other new onset medical symptoms.  Current weight 246 lbs.  PIV placed right hand , 1 attempt .  Pre medicated with  Acetaminophen 650 mg by mouth, 30 minutes prior to infusion Diphenhydramine 50 mg by mouth, 30 minutes prior to infusion. Hydrocortisone 100 mg IV push over 2-5 minutes, 30 minutes prior to infusion. Infusion completed without complication or reaction. Pt reports therapy is effective  in managing symptoms related to therapy.    Love Ribera RN, BSN  [facility]  [phone]  [email or web address]

## 2020-08-14 NOTE — TELEPHONE ENCOUNTER
Video visit scheduled for 8/20.     ANGE Diallo, RN-BC  Patient Care Supervisor  [facility]

## 2020-08-14 NOTE — TELEPHONE ENCOUNTER
[facility] Call Center    Phone Message    May a detailed message be left on voicemail: yes     Reason for Call: Medication Refill Request    Has the patient contacted the pharmacy for the refill? Yes   Name of medication being requested: traMADol (ULTRAM) 50 MG tablet  Provider who prescribed the medication: Dr. Sher  Pharmacy: [facility] #[id number] [address] AT SEC OF [facility]   Date medication is needed: 8/14/20     Hilaria is out of this medication and has been going without.    Action Taken: Message routed to:  [facility] ([facility]):  NEUROLOGY    Travel Screening: Not Applicable

## 2020-08-14 NOTE — PROGRESS NOTES
This is a recent snapshot of the patient's [facility] medical record.  For current drug dose and complete information and questions, call [phone] or In Basket pool, fv home infusion ([id number])  CSN Number:  [id number]

## 2020-08-14 NOTE — TELEPHONE ENCOUNTER
Rx Authorization:    Requested Medication/ Dose: Tramadol 50    Date last refill ordered: 7/24/20    Quantity ordered: 20    # refills: 0    Date of last clinic visit with ordering provider: 7/29/20    Date of next clinic visit with ordering provider: 0    All pertinent protocol data (lab date/result):     Include pertinent information from patients message:

## 2020-08-17 NOTE — PROGRESS NOTES
This is a recent snapshot of the patient's [facility] medical record.  For current drug dose and complete information and questions, call [phone] or In Basket pool, fv home infusion ([id number])  CSN Number:  [id number]

## 2020-08-18 ENCOUNTER — PATIENT OUTREACH (OUTPATIENT)
Dept: NURSING | Facility: CLINIC | Age: 30
End: 2020-08-18
Payer: COMMERCIAL

## 2020-08-18 ENCOUNTER — TELEPHONE (OUTPATIENT)
Dept: PALLIATIVE MEDICINE | Facility: CLINIC | Age: 30
End: 2020-08-18

## 2020-08-18 ENCOUNTER — HOSPITAL ENCOUNTER (OUTPATIENT)
Dept: OCCUPATIONAL THERAPY | Facility: CLINIC | Age: 30
Setting detail: THERAPIES SERIES
End: 2020-08-18
Attending: PHYSICIAN ASSISTANT
Payer: COMMERCIAL

## 2020-08-18 ENCOUNTER — HOSPITAL ENCOUNTER (OUTPATIENT)
Dept: PHYSICAL THERAPY | Facility: CLINIC | Age: 30
Setting detail: THERAPIES SERIES
End: 2020-08-18
Attending: PHYSICIAN ASSISTANT
Payer: COMMERCIAL

## 2020-08-18 DIAGNOSIS — G62.9 NEUROPATHY: Primary | ICD-10-CM

## 2020-08-18 PROCEDURE — 97112 NEUROMUSCULAR REEDUCATION: CPT | Mod: GP | Performed by: PHYSICAL THERAPIST

## 2020-08-18 PROCEDURE — 97110 THERAPEUTIC EXERCISES: CPT | Mod: GP | Performed by: PHYSICAL THERAPIST

## 2020-08-18 PROCEDURE — 97116 GAIT TRAINING THERAPY: CPT | Mod: GP | Performed by: PHYSICAL THERAPIST

## 2020-08-18 PROCEDURE — 97530 THERAPEUTIC ACTIVITIES: CPT | Mod: GO | Performed by: OCCUPATIONAL THERAPIST

## 2020-08-18 RX ORDER — HYDROCORTISONE SODIUM SUCCINATE 100 MG/2ML
INJECTION, POWDER, FOR SOLUTION INTRAMUSCULAR; INTRAVENOUS
COMMUNITY
Start: 2020-08-12 | End: 2021-03-22

## 2020-08-18 RX ORDER — HUMAN IMMUNOGLOBULIN G 40 G/400ML
40 LIQUID INTRAVENOUS
COMMUNITY
Start: 2020-08-12 | End: 2023-05-12

## 2020-08-18 RX ORDER — DIPHENHYDRAMINE HYDROCHLORIDE 50 MG/ML
INJECTION INTRAMUSCULAR; INTRAVENOUS
COMMUNITY
Start: 2020-08-12 | End: 2021-12-20

## 2020-08-18 RX ORDER — EPINEPHRINE 0.3 MG/.3ML
INJECTION SUBCUTANEOUS
COMMUNITY
Start: 2020-08-12 | End: 2021-03-03

## 2020-08-18 NOTE — LETTER
[facility] [place]  08/20/20    Patient: Hilaria Bonner  YOB: 1990  Medical Record Number: [id number]                                                                  Opioid / Opioid Plus Controlled Substance Agreement    I understand that my care provider has prescribed an opioid (narcotic) controlled substance to help manage my condition(s). I am taking this medicine to help me function or work. I know this is strong medicine, and that it can cause serious side effects. Opioid medicine can be sedating, addicting and may cause a dependency on the drug. They can affect my ability to drive or think, and cause depression. They need to be taken exactly as prescribed. Combining opioids with certain medicines or chemicals (such as cocaine, sedatives and tranquilizers, sleeping pills, meth) can be dangerous or even fatal. Also, if I stop opioids suddenly, I may have severe withdrawal symptoms. Last, I understand that opioids do not work for all types of pain nor for all patients. If not helpful, I may be asked to stop them.        The risks, benefits, and side effects of these medicine(s) were explained to me. I agree that:    1. I will take part in other treatments as advised by my care team. This may be psychiatry or counseling, physical therapy, behavioral therapy, group treatment or a referral to a pain clinic. I will reduce or stop my medicine when my care team tells me to do so.  2. I will take my medicines as prescribed. I will not change the dose or schedule unless my care team tells me to. There will be no refills if I  run out early.   I may be contactedwithout warning and asked to complete a urine drug test or pill count at any time.   3. I will keep all my appointments, and understand this is part of the monitoring of opioids. My care team may require an office visit for EVERY opioid/controlled substance refill. If I miss appointments or don t follow instructions, my  care team may stop my medicine.  4. I will not ask other providers to prescribe controlled substances, and I will not accept controlled substances from other people. If I need another prescribed controlled substance for a new reason, I will tell my care team within 1 business day.  5. I will use one pharmacy to fill all of my controlled substance prescriptions, and it is up to me to make sure that I do not run out of my medicines on weekends or holidays. If my care team is willing to refill my opioid prescription without a visit, I must request refills only during office hours, refills may take up to 3 days to process, and it may take up to 5 to 7 days for my medicine to be mailed and ready at my pharmacy. Prescriptions will not be mailed anywhere except my pharmacy.        022884  Rev 12/18         Registration to scan to EHR                             Page 1 of 2               Controlled Substance Agreement Opioid        [facility] [place]  08/20/20  Patient: Hilaria Bonner  YOB: 1990  Medical Record Number: [id number]                                                                  6. I am responsible for my prescriptions. If the medicine/prescription is lost or stolen, it will not be replaced. I also agree not to share controlled substance medicines with anyone.  7. I agree to not use ANY illegal or recreational drugs. This includes marijuana, cocaine, bath salts or other drugs. I agree not to use alcohol unless my care team says I may.          I agree to give urine samples whenever asked. If I don t give a urine sample, the care team may stop my medicine.    8. If I enroll in the [place] Medical Marijuana program, I will tell my care team. I will also sign an agreement to share my medical records with my care team.   9. I will bring in my list of medicines (or my medicine bottles) each time I come to the clinic.   10. I will tell my care team right away if I become  pregnant or have a new medical problem treated outside of my regular clinic.  11. I understand that this medicine can affect my thinking and judgment. It may be unsafe for me to drive, use machinery and do dangerous tasks. I will not do any of these things until I know how the medicine affects me. If my dose changes, I will wait to see how it affects me. I will contact my care team if I have concerns about medicine side effects.    I understand that if I do not follow any of the conditions above, my prescriptions or treatment may be stopped.      I agree that my provider, clinic care team, and pharmacy may work with any city, state or federal law enforcement agency that investigates the misuse, sale, or other diversion of my controlled medicine. I will allow my provider to discuss my care with or share a copy of this agreement with any other treating provider, pharmacy or emergency room where I receive care. I agree to give up (waive) any right of privacy or confidentiality with respect to these consents.     I have read this agreement and have asked questions about anything I did not understand.      ________________________________________________________________________  Patient signature - Date/Time -  Hilaria Bonner                                      ________________________________________________________________________  Witness signature                                                            ________________________________________________________________________  Provider signature - RG Ibrahim CNP      [id number]  Rev 12/18         Registration to scan to EHR                         Page 2 of 2                   Controlled Substance Agreement Opioid           Page 1 of 2  Opioid Pain Medicines (also known as Narcotics)  What You Need to Know    What are opioids?   Opioids are pain medicines that must be prescribed by a doctor.  They are also known as narcotics.    Examples are:      morphine (MS Contin, Yolis)    oxycodone (Oxycontin)    oxycodone and acetaminophen (Percocet)    hydrocodone and acetaminophen (Vicodin, Norco)     fentanyl patch (Duragesic)     hydromorphone (Dilaudid)     methadone     What do opioids do well?   Opioids are best for short-term pain after a surgery or injury. They also work well for cancer pain. Unlike other pain medicines, they do not cause liver or kidney failure or ulcers. They may help some people with long-lasting (chronic) pain.     What do opioids NOT do well?   Opioids never get rid of pain entirely, and they do not work well for most patients with chronic pain. Opioids do not reduce swelling, one of the causes of pain. They also don t work well for nerve pain.                           For informational purposes only.  Not to replace the advice of your care provider.  Copyright [facility]. All right reserved. [employer] 059026-Jus 02/18.      Page 2 of 2    Risks and side effects   Talk to your doctor before you start or decide to keep taking one of these medicines. Side effects include:    Lowering your breathing rate enough to cause death    Overdose, including death, especially if taking higher than prescribed doses    Long-term opioid use    Worse depression symptoms; less pleasure in things you usually enjoy    Feeling tired or sluggish    Slower thoughts or cloudy thinking    Being more sensitive to pain over time; pain is harder to control    Trouble sleeping or restless sleep    Changes in hormone levels (for example, less testosterone)    Changes in sex drive or ability to have sex    Constipation    Unsafe driving    Itching and sweating    Feeling dizzy    Nausea, vomiting and dry mouth    What else should I know about opioids?  When someone takes opioids for too long or too often, they become dependent. This means that if you stop or reduce the medicine too quickly, you will have withdrawal symptoms.    Dependence is  not the same as addiction. Addiction is when people keep using a substance that harms their body, their mind or their relations with others. If you have a history of drug or alcohol abuse, taking opioids can cause a relapse.    Over time, opioids don t work as well. Most people will need higher and higher doses. The higher the dose, the more serious the side effects. We don t know the long-term effects of opioids.      Prescribed opioids aren't the best way to manage chronic pain    Other ways to manage pain include:      Ibuprofen or acetaminophen.  You should always try this first.      Treat health problems that may be causing pain.      acupuncture or massage, deep breathing, meditation, visual imagery, aromatherapy.      Use heat or ice at the pain site      Physical therapy and exercise      Stop smoking      See a counselor or therapist                                                  People who have used opioids for a long time may have a lower quality of life, worse depression, higher levels of pain and more visits to doctors.    Never share your opioids with others. Be sure to store opioids in a secure place, locked if possible.Young children can easily swallow them and overdose.     You can overdose on opioids.  Signs of overdose include decrease or loss of consciousness, slowed breathing, trouble waking and blue lips.  If someone is worried about overdose, they should call 911.    If you are at risk for overdose, you may get naloxone (Narcan, a medicine that reverses the effects of opioids.  If you overdose, a friend or family member can give you Narcan while waiting for the ambulance.  They need to know the signs of overdose and how to give Narcan.    While you're taking opioids:    Don't use alcohol or street drugs. Taking them together can cause death.    Don't take any of these medicines unless your doctor says its okay.  Taking these with opioids can cause death.    Benzodiazepines (such as  lorazepam         or diazepam)    Muscle relaxers (such as cyclobenzaprine)    sleeping pills    other opioids    Safe disposal of opioids  Find your area drug take-back program, your pharmacy mail-back program, buy a special disposal bag (such as Deterra) from your pharmacy or flush them down the toilet.  Use the guidelines at:  www.fda.gov/drugs/resourcesforyou

## 2020-08-18 NOTE — PROGRESS NOTES
The patient has been notified of following:     This telephone visit will be conducted via a call between you and your provider. We have found that certain health care needs can be provided without the need for a physical exam.  This service lets us provide the care you need with a phone conversation.  If a prescription is necessary we can send it directly to your pharmacy.  If lab work is needed we can place an order for that and you can then stop by our lab to have the test done at a later time. This is a billable service but we do not know the cost at this time.     Patient has given verbal consent for Telephone visit?  Yes    How would you like to obtain your AVS? MyChart  If you are dropped from the telephone visit, the Telephone invite should be resent to: [phone]      Recommendations at last vist on 7/9/2020:             We discussed that as Shama pain has been present for roughly 8 weeks it is not yet considered chronic. Also, she has plans to start physical therapy and occupational therapy later on this month. Cymbalta was just increased to 60mg daily. For these reasons, I suggested we hold off on participating in our pain program for at least another month. If she is not starting to see some improvement in that time frame and is interested in our multidisciplinary program, I would recommend participation in our pain program. I am happy to provide a list of recommendations in the interim.                 1.  Pain Physical Therapy:                Start physical therapy and occupational therapy as planned later on this month. This could provide great benefit. May consider pain physical therapy in the future.               2.  Pain Psychologist to address relaxation, behavioral change, coping style, and other factors important to improvement.                May consider this resource in a month or so if interested. Pain has made a clear impact on her life.               3.  Diagnostic Studies:  "have upper and lower extremity EMG as planned.               4.  Medication Management:                           1. We discussed today that I do not recommend the use of opioids for long term management of chronic pain. That being said, it could be reasonable to use a lower strength opioid, like Tramadol, as needed for severe pain for the next month or so. Would not recommend beyond that time frame. She verbalized understands of this.                          2. Continue Lyrica at 150mg TID.                          3. Increased dose of Cymbalta was directed but she has not started yet, it sounds like there may have been a filling issue at the pharmacy. Start increased dose of Cymbalta 60mg daily as soon as able.                          4. May consider the addition of Topamax. Might consider starting low dose nortriptyline as an adjunct to Cymbalta and Lyrica (once no longer taking Tramadol).                           5. May consider addition of hemp CBD oil. Could consider medical cannabis in the future.               5.  Referrals: consider purchase of a TENS unit. Consider a trial of acupuncture.               6.  Follow up with RG Puri CNP in 4 weeks or more if interested in participating in our multidisciplinary program.       Additional provider notes:  -Her pain is worse than it was at last visit.  -She initially expresses frustration with lack of communication between neurology and the pain clinic. States she was advised to follow up with the pain clinic and didn't understand why I wouldn't be able to help manage her pain.   -Her pain remains located in bilateral hands and feet. Her pain in LEs are from her feet to her knees. Describes the pain is \"burning\" and \"needles.\"   -She has continued evaluation and treatment with neurology- Dr. Chua and Dr. Sher including EMGs. It is suspected that her condition is autoimmune and was triggered by COVID-19. She was started on IVIG, started on " "infusions last week. She reports she has noticed what seems an increase in the frequency of her \"lightning pain\" since that time but no other changes.  -She started neuro PT and OT and has had a few sessions. She isn't sure if it has helped yet but she states she wants to give these resources a fair shot.   -She continues Lyrica, Tramadol, and Cymbalta with minimal benefit.   -She is open to any recommendations the pain clinic may have to manage her pain better.      Current pain medications:              Lyrica 150mg TID- [facility], \"a little bit\"               Tramadol 50mg Q6h- SW, prescribed by neurologist Dr. Sher, takes 100mg prn, usually once or twice a day              Cymbalta 30mg BID- ?, has been on this dose for a month or two              Hydroxyzine 25-50mg prn- H for anxiety   Folate 1mg daily    1. Previous Pain Relevant Medications:  NOTE: This medication information taken from patient's intake form, not medical records.               Opiates: Tramadol- [facility], oxycodone- ?/[facility]              NSAIDS: doesn't take anti-inflammatories due to gastric bypass              Muscle Relaxants: tizanidine- H for sleep only              Anti-migraine mediations: no              Anti-depressants: amitriptyline (up to 75mg)- ?, \"it put me to sleep\", Cymbalta- ?              Sleep aids: no              Anxiolytics: hydroxyzine- H               Neuropathics: Lyrica- [facility], gabapentin (started on for numbness in toes and migraines after cardiac arrest)- SE, fatigue, Topamax- H for weight loss              Topicals: gabapentin cream- NH/[facility], lidocaine cream- NH, W, burned more              Other medications not covered above: Tylenol- NH     2. Physical Therapy: starting physical therapy and occupational therapy at the end of this month  3. Pain Psychology: no  4. Surgery: gastric bypass March 2019  5. Injections: no  6. Chiropractic: no  7. Acupuncture: no  8. TENS Unit: no     Assessment:  Hilaria Bonner is a 29 year old " female with a past medical history significant for PE with associated PEA arrest, morbid obesity s/p sleeve gastrectomy, pancreatitis, and recent COVID 19 who presents with complaints of upper and lower extremity pain.      1. Upper and lower extremity pain- etiology unclear, may be an underlying autoimmune condition triggered by COVID polyneuritis, undergoing evaluation and treatment with neurology. Nutritional deficiencies but seems less likely.   2. Mental Health - the patient's mental health concerns, specifically anxiety, affect her experience of pain and contribute to her clinically significant distress.     1. Polyneuropathy    2. Encounter for long-term opiate analgesic use         Plan:    We discussed the miscommunication about participating in our pain program and I apologized for any confusion. Though I was hesitant to interfere in treatment plan with neurology it sounds as though my input in pain management would be useful. Though her pain is subacute (rather than stable chronic pain as is our usual focus), I do have some recommendations that could be useful. I was able to speak directly with Dr. Chua with neurology and discuss my pain treatment plan today. He did not raise any concerns about our management plans and did share some information on her treatment/hopes with using IVIG.  We will plan on Hilaria working with our pain clinic for the next few months.      1.  Pain Physical Therapy:     Continue Neuro PT and OT as planned. Though Hilaria isn't sure how helpful these resources are, I encouraged she continue on a regular basis. We may consider pain physical therapy in the future.    2.  Pain Psychologist to address relaxation, behavioral change, coping style, and other factors important to improvement.     NO  May consider later on.    3.  Medication Management:     1. As pain continues and seems to be worsening we will try to address pain in a few different ways. First I advised she  increase Lyrica from 150mg TID to 200mg TID. She understands this is the max dose. Call with issues and monitor pain impact.    2. In two weeks (to avoid making two medication changes at the same time), she will increase Cymbalta to 90mg daily. We will increase up to 120mg as tolerated with subsequent visits.    3. We discussed the use of opioids for pain at length today. While appropriate for acute and subacute pain, in general, I do not recommend for chronic pain management because of the development of tolerance over time, opioid induced hyperalgesia, sedation and cognitive impairment, sleep disordered breathing, and risk of unintentional overdose and death. That being said, given Hilaria's unique situation we discussed that it may be reasonable to continue for another 1-3 months but I DO NOT recommend beyond that time frame. She verbalized understanding of this. She will finish current prescription of Tramadol- as needed for severe pain. She will come in to clinic for a UDS and opioid agreement today or tomorrow- nursing will help set this up. I will plan on prescribing Norco to be taken in place of Tramadol as needed for severe pain.    4.  Referrals: continue evaluation and treatment with Neurology.    5.  Follow up with RG Puri CNP in 4 weeks.       Phone call duration: 30 minutes    RG Ibrahim CNP

## 2020-08-18 NOTE — PROGRESS NOTES
"Clinic Care Coordination Contact    Follow Up Progress Note      Assessment: Outreach to patient.    Patient states he has had not changes in her painful hands and feet.   Patient states she had three days of IVIG infusion with Friday being her last day. She has not noticed any difference in her pain, but she states she has not been able to sleep. She states she will get this very three weeks.     Patient states she has difficulty walking. She has had several falls. She states I go to get up and I fall down. This is intermittent sometimes she can walk. She states she has swelling in her hands and feet as well. This also comes and goes.     Patient is not able to care for her children without the help of her mother. She states \"I am supposed to be helping my mother, not her helping me.\"     Patient is scheduled for outpatient PT/OT today.  She hopes this will help.      Verified patient is taking medications as prescribed.     Goals addressed this encounter: No improvement  Goals Addressed                 This Visit's Progress      Improve chronic symptoms (pt-stated)   10%     Goal Statement: I want the burning pain in my hands to improve    Date Goal set: 6/12/20    Barriers: Polyneuropathy    Strengths: Family support    Date to Achieve By: October 2020    Patient expressed understanding of goal: yes    Action steps to achieve this goal:  1. I will continue Lyrica TID  2. I will use Tramadol as instructed for pain  3. I will participate in outpatient therapies         Intervention/Education provided during outreach: Continue new treatment.      Outreach Frequency: monthly    Plan:   Patient will participate in therapies.    Patient will continue IVIG [facility]    Care Coordinator will follow up in one month    Monica Hernandez RN, [facility] RN Coordinator  [facility]   8/18/2020    2:58 PM  [phone]  "

## 2020-08-18 NOTE — TELEPHONE ENCOUNTER
Called and left a voicemail discussing upcoming appointment on Thursday and my recommendation to postpone. 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 At this point I think it makes sense to hold off on participation in our pain program as she is actively undergoing a treatment plan and neuro PT/OT. I would not want to interfere with the treatment of her condition. Should her pain persist beyond the recommended treatment plan, of course I would be happy to see her.     RG Puri [facility] Pain Management

## 2020-08-18 NOTE — TELEPHONE ENCOUNTER
Janki Maxine called Hilaria and left a voice mail asking her to cancel her virtual visit on 08/20/2020 with her.  She would like Hilaria to focus on the treatment plan by Neurology.  Pt can call with an update in a few months.  Janki will decide a treatment plan after we get the update if one is needed.      Stephenie Perez RN  Care Coordinator  [facility] Pain [facility]

## 2020-08-19 ENCOUNTER — TELEPHONE (OUTPATIENT)
Dept: FAMILY MEDICINE | Facility: CLINIC | Age: 30
End: 2020-08-19

## 2020-08-19 DIAGNOSIS — R20.0 NUMBNESS AND TINGLING: ICD-10-CM

## 2020-08-19 DIAGNOSIS — G62.9 POLYNEUROPATHY: Primary | ICD-10-CM

## 2020-08-19 DIAGNOSIS — R20.2 NUMBNESS AND TINGLING: ICD-10-CM

## 2020-08-19 DIAGNOSIS — R20.2 PARESTHESIAS: ICD-10-CM

## 2020-08-19 NOTE — TELEPHONE ENCOUNTER
I do not have someone specific in mind - may ask for a different provider at [facility] or follow up with [facility]- will need to call them and make sure has coverage with insurance

## 2020-08-19 NOTE — TELEPHONE ENCOUNTER
Mary, RN from Neurology clinic called Pain clinic and transferred to this nurse. , She explained that she received a message from Dr. Sutherland stating that he really wants the Pain specialist to be involved in her care. Stated that the patient had received tramadol with no relief and they are wondering about other options to help with managing the pain.     Mary explained that the IVIG may change sensory symptoms but not the pain. Pt won't get another dose of IVIG for 2.5 - 3 weeks.      Dr. Momo Sutherland and Dr. Travon Chua would like Neurology and Pain to work together to better manage her pain. Mary said that Dr. Sutherland and Dr. Chua are very responsive via Epic so Janki Goodman CNP should feel free to send staff message re: recs or concerns if needed.     Review of chart shows that initial evaluation was on 7/9.     Since there are 2 calls open about this appointment, will add recent information to the other call and close this one.     Ivette Hylton, JONATHANN, RN-BC  Patient Care Supervisor  [facility]

## 2020-08-19 NOTE — TELEPHONE ENCOUNTER
Appointment still scheduled at this time. Per notes in a separate encounter, pt was upset about the possibility of the appointment being cancelled since she is experiencing severe pain.    JONATHAN DialloN, RN-BC  Patient Care Supervisor  [facility] Pain Management [place]

## 2020-08-19 NOTE — TELEPHONE ENCOUNTER
.Reason for call:  Other   Patient called regarding (reason for call): call back  Additional comments: pt has set up appointments with the pain clinic that she was referred to. She feels as though the provider that she is working with is not helping her. Pt would like to see someone else and was wondering if she can be referred to someone specific that is going to help ease her pain. Please give her a call back with some options that she can take. Next steps.    Phone number to reach patient:  Home number on file [phone] (home)    Best Time:  anytime    Can we leave a detailed message on this number?  YES    Travel screening: Negative

## 2020-08-19 NOTE — TELEPHONE ENCOUNTER
Mary, RN from Neurology clinic called Pain clinic and transferred to this nurse. , She explained that she received a message from Dr. Sutherland stating that he really wants the Pain specialist to be involved in her care. Stated that the patient had received tramadol with no relief and they are wondering about other options to help with managing the pain.     Mary explained that the IVIG may change sensory symptoms but not the pain. Pt won't get another dose of IVIG for 2.5 - 3 weeks.      Dr. Momo Sutherland and Dr. Travon Chua would like Neurology and Pain to work together to better manage her pain. Mary said that Dr. Sutherland and Dr. Chua are very responsive via Epic so Janki Goodman CNP should feel free to send staff message re: recs or concerns if needed.     Review of chart shows that initial evaluation was on 7/9.      Patient will keep her appointment on Friday 8/20.     JONATHAN DialloN, RN-BC  Patient Care Supervisor  [facility] Pain Management [place]

## 2020-08-19 NOTE — TELEPHONE ENCOUNTER
Called Hilaria.  She did receive Janki's message yesterday to cancel the virtual visit.  She was very upset by the message and does not want to cancel the appointment.    Pt states when she first saw Janki on a video call,  She was told she had to be establish care with our clinic  for 12 weeks in order to have her pain treated by us.    She was referred to another neurologist for an EMG.  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  She is in a lot of pain.  Went to Physical Therapy rehab and was told she will have to live with this for the rest of her life.  She was referred to the pain clinic to work side by side with the neurologist to treat her pain.    She is taking Tramadol and the Tramadol eases the pain but does not relieve the pain.  She does not want to cancel the virtual visit set up on Thursday.  She wants Janki  to be in contact with the neurologist to work with them to treat her pain.  Taking Lyrica, tramadol.  She really wants to talk about everything that is going on. She has been told by Janki that we don't want to intervene with the neurologist, but pt wants us to work with her neurologist.  Tomorrow's visit will have to be on the home phone due to her cell phone acting up.      Routing to provider     Stephenie Perez RN  Care Coordinator  [facility] Pain Management

## 2020-08-20 ENCOUNTER — VIRTUAL VISIT (OUTPATIENT)
Dept: PALLIATIVE MEDICINE | Facility: CLINIC | Age: 30
End: 2020-08-20
Payer: COMMERCIAL

## 2020-08-20 ENCOUNTER — HOSPITAL ENCOUNTER (OUTPATIENT)
Dept: PHYSICAL THERAPY | Facility: CLINIC | Age: 30
Setting detail: THERAPIES SERIES
End: 2020-08-20
Attending: PHYSICIAN ASSISTANT
Payer: COMMERCIAL

## 2020-08-20 ENCOUNTER — HOSPITAL ENCOUNTER (OUTPATIENT)
Dept: OCCUPATIONAL THERAPY | Facility: CLINIC | Age: 30
Setting detail: THERAPIES SERIES
End: 2020-08-20
Attending: PHYSICIAN ASSISTANT
Payer: COMMERCIAL

## 2020-08-20 DIAGNOSIS — G62.9 POLYNEUROPATHY: Primary | ICD-10-CM

## 2020-08-20 DIAGNOSIS — Z79.891 ENCOUNTER FOR LONG-TERM OPIATE ANALGESIC USE: ICD-10-CM

## 2020-08-20 PROCEDURE — 80307 DRUG TEST PRSMV CHEM ANLYZR: CPT | Mod: 90 | Performed by: NURSE PRACTITIONER

## 2020-08-20 PROCEDURE — 99000 SPECIMEN HANDLING OFFICE-LAB: CPT | Performed by: NURSE PRACTITIONER

## 2020-08-20 PROCEDURE — 97110 THERAPEUTIC EXERCISES: CPT | Mod: GP | Performed by: PHYSICAL THERAPIST

## 2020-08-20 PROCEDURE — 99214 OFFICE O/P EST MOD 30 MIN: CPT | Mod: 95 | Performed by: NURSE PRACTITIONER

## 2020-08-20 PROCEDURE — 97116 GAIT TRAINING THERAPY: CPT | Mod: GP | Performed by: PHYSICAL THERAPIST

## 2020-08-20 PROCEDURE — 97530 THERAPEUTIC ACTIVITIES: CPT | Mod: GO | Performed by: OCCUPATIONAL THERAPIST

## 2020-08-20 PROCEDURE — 97110 THERAPEUTIC EXERCISES: CPT | Mod: GO | Performed by: OCCUPATIONAL THERAPIST

## 2020-08-20 RX ORDER — PREGABALIN 200 MG/1
200 CAPSULE ORAL 3 TIMES DAILY
Qty: 90 CAPSULE | Refills: 3 | Status: SHIPPED | OUTPATIENT
Start: 2020-08-20 | End: 2021-02-03

## 2020-08-20 RX ORDER — HYDROCODONE BITARTRATE AND ACETAMINOPHEN 5; 325 MG/1; MG/1
1 TABLET ORAL EVERY 6 HOURS PRN
Qty: 60 TABLET | Refills: 0 | Status: SHIPPED | OUTPATIENT
Start: 2020-08-20 | End: 2020-09-17

## 2020-08-20 RX ORDER — DULOXETIN HYDROCHLORIDE 30 MG/1
90 CAPSULE, DELAYED RELEASE ORAL DAILY
Qty: 60 CAPSULE | Refills: 3 | Status: SHIPPED | OUTPATIENT
Start: 2020-08-20 | End: 2020-10-15

## 2020-08-20 ASSESSMENT — PAIN SCALES - GENERAL: PAINLEVEL: WORST PAIN (10)

## 2020-08-20 NOTE — TELEPHONE ENCOUNTER
See phone message from 08/19/2020.    Stephenie Perez RN  Care Coordinator  [facility] Pain Management

## 2020-08-20 NOTE — TELEPHONE ENCOUNTER
Wonderful! Sounds like a great plan. Order placed earlier on this morning.     Once UDS is completed I will send over a prescription for Norco as discussed. Nursing please review administration instructions after reviewing AVS. Thanks!    RG Puri [facility] Pain Management

## 2020-08-20 NOTE — TELEPHONE ENCOUNTER
Called Hilaria.  Went over the Opioid Agreement.  She agrees with the consent.  Advised her [place] prescription was sent to her pharmacy She can pick that up and start it. She can take one tablet every 6 Hours for sever pain. She can only take 2 tablets in 24 hours.   Went over AVS.  Pt thought she was suppose to take the Lyrica as 150 mg AM, 150 mg PM and 200 mg  Bedtime. AVS says to increase the Lyrica to 200 mg TID.  Pt only has 150 mg tablets. Will need a new rx for the 200 mg. Also she was not sure how long to stay at the Cymbalta 90 mg daily.  She would like this information sent to her via [employer].      Routing to provider to verify information. Will send pt [employer] message tomorrow.    Stephenie Perez RN  Care Coordinator  [facility] Pain Management

## 2020-08-20 NOTE — TELEPHONE ENCOUNTER
Signed Prescriptions:                        Disp   Refills    HYDROcodone-acetaminophen (NORCO) 5-325 MG*60 tab*0        Sig: Take 1 tablet by mouth every 6 hours as needed for           severe pain Max of 2 tabs/day. Fill date and           start date of 8/20/2020.  Authorizing Provider: GLO LOZA in process and OA completed. Per our plan, we will have Hilaria discontinue Tramadol and start Norco. She can take 1 tablet as needed for severe pain, max of 2 tabs/day. She will need to take this medication exactly as prescribed. Monitor pain benefit. Please review these instructions and OA as planned.       RG Puri [facility] Pain Management

## 2020-08-20 NOTE — TELEPHONE ENCOUNTER
Called Dr Sutherland office. #[phone]. Spoke with Kait CAAL. Asked if we put in an order for a UDS if Hilaria could have this done there during her PT/OT appointments.  As long as Janki Goodman puts in the UDS order, they can do it there.  She is going to contact the lab to see if there is an appointment available.  Also discussed the Opioid Agreement.  Letter placed in chart.  They will print out the OA and have Hilaria sign it there.  I will call her later to go over the document.  Pt will need to go up stairs at check in D to sign the agreement.    Lab appointment set up at 200 pm at the Neurology clinic    Called hilaria.  She will go to her therapies today She will check into the lab after her therapy is done to do the UDS.  She will go up stairs and check in at station D and sign the OA.  She will call me when she gets home to go over the OA and AVS from earlier today.  Neurology Clinic will fax us the OA.    Routing to provider to place UDS Order.    Stephenie Perez RN  Care Coordinator  [facility] Pain Management

## 2020-08-20 NOTE — PATIENT INSTRUCTIONS
1.  Pain Physical Therapy:     Continue Neuro PT and OT as planned. I think these are important resources.    2.  Pain Psychologist to address relaxation, behavioral change, coping style, and other factors important to improvement.     NO  May consider later on.    3.  Medication Management:     1. Increase Lyrica to 200mg three times daily. Call with issues.    2. In two weeks, increase Cymbalta to 90mg daily (x3 30mg capsules).    3. Finish prescription of Tramadol- as needed for severe pain. We will need you to come in for a UDS and opioid agreement- nursing will help set this up. I will plan on prescribing Norco to be taken in place of Tramadol as needed for severe pain. Remember we will only plan on using opioids for the next 1-3 months max.   4.  Referrals: continue evaluation and treatment with Neurology.    5.  Follow up with RG Puri CNP in 4 weeks.

## 2020-08-21 ENCOUNTER — MYC MEDICAL ADVICE (OUTPATIENT)
Dept: PALLIATIVE MEDICINE | Facility: CLINIC | Age: 30
End: 2020-08-21

## 2020-08-21 NOTE — TELEPHONE ENCOUNTER
Okay great. Please review that she should discontinue Tramadol prior to starting Norco.    Because there was some confusion when I went over the instructions for gradual titration of Lyrica, I decided to go ahead and have her start 200mg TID right away. I think she will tolerate this titration fine BUT if she has issues- increased drowsiness, fatigue, or dizziness, she should call the clinic and we can adjust accordingly. New Lyrica prescription was sent over yesterday.     As we discussed, in 2 weeks (to avoid making two medication changes at the same time) she will increase Cymbalta to 90mg daily and stay at that dose until our follow up visit.    RG Puri CNP  [facility] Pain Management

## 2020-08-21 NOTE — TELEPHONE ENCOUNTER
[employer] message sent to patient:  Good Morning Hilaria    Janki wanted to apologize for the confusion about your plan.  I verified everything with her and I hope this clears up a few things.    Medication Management:   This was the orgianl plan for the Lyrica. As pain continues and seems to be worsening we will try to address pain in a few different ways. First I advised she increase Lyrica from 150mg three times a day to 200mg three times a day. Orginaly she was going to have you adjust the Lyrica a different way, but this is an easier adjustment to follow.  A new prescription has been sent to your pharmacy.  You can start this new dose today.  Please call with issues and monitor pain impact.                            In two weeks (to avoid making two medication changes at the same time), you will increase Cymbalta to 90mg daily. Future adjustments will be discussed at future appointments.                           You and Janki discussed the use of opioids for pain at length today. While appropriate for acute and subacute pain, in general, Janki does not recommend for chronic pain management because of the development of tolerance over time, opioid induced hyperalgesia, sedation and cognitive impairment, sleep disordered breathing, and risk of unintentional overdose and death. That being said, given your unique situation you both discussed that it may be reasonable to continue for another 1-3 months but Janki does not recommend beyond that time frame.  You can will the current prescription of Tramadol- as needed for severe pain.  Norco has been prescribed and a prescription has been sent to your pharmacy.  Please do  not take both the Norco and Tramadol at the same time.  When you are out of the Tramadol, you can start the Norco as needed for sever pain.              Please continue the evaluation and treatment with Neurology and follow up with RG Puri CNP in 4 weeks.     I hope you have a  wonderful.  Please give us a call if you have any questions or concerns.      Take Care,    Stephenie Perez RN  Care Coordinator  [facility]

## 2020-08-23 ENCOUNTER — MYC MEDICAL ADVICE (OUTPATIENT)
Dept: PALLIATIVE MEDICINE | Facility: CLINIC | Age: 30
End: 2020-08-23

## 2020-08-24 ENCOUNTER — TELEPHONE (OUTPATIENT)
Dept: PALLIATIVE MEDICINE | Facility: CLINIC | Age: 30
End: 2020-08-24

## 2020-08-24 LAB — LAB SCANNED RESULT: ABNORMAL

## 2020-08-24 NOTE — TELEPHONE ENCOUNTER
Called pt. She states she is on hold on another phone call, she will call back at a later time.      Copied mychart below from separate encounter:    Se Casiano I am confused with the medication I'm supposed to take can you please give me a call tomorrow so we can clear things up..I have not reset my medicine just yet I want clear  Clarification  Please call me when you have a chance. [phone]   Thank you    My feet and hands be swollen since Friday and very painful    Kait CASSIDY, RN Care Coordinator  [facility]  Pain Management

## 2020-08-24 NOTE — TELEPHONE ENCOUNTER
Called pt. Explained Lyrica increase. She will call with any issues. She will increase Cymbalta in 2 weeks.  She states that her feet have been more puffy since Saturday, notes that she has been up walking more. We discussed swelling can occur with having legs dependant more than usual.  She also inquired about furosemide-reports she had not been taking it.  Advised that she should discuss this medication with PCP but this would likely be the cause of her increase swelling. States no further questions.     Kait CASSIDY, RN Care Coordinator  [facility]  Pain Management

## 2020-08-24 NOTE — TELEPHONE ENCOUNTER
Pt has questions on her medications. She is requesting a call back.      Tegan GALVAN    [facility] [facility] [place]

## 2020-08-24 NOTE — TELEPHONE ENCOUNTER
Copied mychart into an already open encounter. Closing to avoid duplicate    Kait CASSIDY, RN Care Coordinator  [facility]  Pain [facility]

## 2020-08-24 NOTE — TELEPHONE ENCOUNTER
Addressed this in another open encounter. Closing to avoid duplicates    Kait CASSIDY, RN Care Coordinator  [facility]  Pain [facility]

## 2020-08-25 ENCOUNTER — TELEPHONE (OUTPATIENT)
Dept: PALLIATIVE MEDICINE | Facility: CLINIC | Age: 30
End: 2020-08-25

## 2020-08-25 NOTE — TELEPHONE ENCOUNTER
Thank you for the information.     I would like to order the add on ETG testing to the sample to rule out contamination. Please let me know which order is needed.    RG Puri [facility] Pain Management

## 2020-08-25 NOTE — TELEPHONE ENCOUNTER
Called [facility]-  They states that amitriptyline would be the only source of a positive amitriptyline.   They advised that alcohol in urine sample could have become positive if for example pateint swallowed mouth wash or if had used a hand  that then contaminated the sample.  [facility] advised that 0.1g/dL is not a large amount found. Advised that an add on of ETG testing can be added to this sample. This ETG would determine acute versus chronic ETOH use and also rule out contamination by hand . ETG would only be present in moderate to chronic ETOH use. Per [employer] alcohol stays in urine only a few hours so if this result is due to general alcohol consumption it would have been consumed in a relatively short time prior to sample.       Kait CASSIDY, RN Care Coordinator  [facility]  Pain Management

## 2020-08-25 NOTE — TELEPHONE ENCOUNTER
Called to have ETG testing added on. They will fax add on test form to [facility] location.   Fax received     Kait CASSIDY, RN Care Coordinator  [facility]  Pain On license of [facility]

## 2020-08-25 NOTE — TELEPHONE ENCOUNTER
"Called Hilaria to discuss urine drug screen results and presence of alcohol. She states she cannot remember if she had alcohol last Thursday, \"its possible but I don't drink like that Janki.\" Denies regular alcohol consumption and states she knows she should not be drinking with the medications she is taking. At this point, I will not be able to prescribe opioids moving forward- specifically Norco. She verbalized understanding of this but expressed frustration, \"I don't understand why I am being punished, I don't drink like that.\" We again reviewed that the UDS was positive for alcohol and I that have to make the best decisions caring for her that will keep her safe. I have requested an additional test to rule out other contaminants. I will call her once I receive these results to discuss.     RG Puri [facility] Pain Management    "

## 2020-08-26 ENCOUNTER — TELEPHONE (OUTPATIENT)
Dept: NEUROLOGY | Facility: CLINIC | Age: 30
End: 2020-08-26

## 2020-08-26 ENCOUNTER — MYC MEDICAL ADVICE (OUTPATIENT)
Dept: PALLIATIVE MEDICINE | Facility: CLINIC | Age: 30
End: 2020-08-26

## 2020-08-26 NOTE — TELEPHONE ENCOUNTER
Zia message from patient on 8/26 at 1206:    Can you call me when you get a chance Janki _  [phone]     Thank you  ---------  Provider recently had a conversation with provider re: UDS results.     Will route to Janki Goodman CNP.     Ivette Hylton, JONATHANN, RN-BC  Patient Care Supervisor  [facility] Pain Management [place]

## 2020-08-26 NOTE — TELEPHONE ENCOUNTER
"RN spoke to Hilaria who states that this morning she was holding the phone in her left hand and noticed her right hand starting to tremor. She explained that it looked like her fingers were trying to play the piano. She moved the phone to her right hand and noticed that her left hand started to tremor.   This has never happened before and would like Dr Sher to be aware.  She then started to explain the conversation with the nurse at the pain clinic and how they found alcohol and other medications on her urine drug screening. Apparently her mother made her a couple of drinks the day she was tested because she didn't sleep at all the night before. Her mother thought a couple of drinks would help her rest. She says \"I am not an alcoholic\".   Encounter routed to Dr Sher to review.   Kait Ambriz RNCC  Neurology     "

## 2020-08-26 NOTE — TELEPHONE ENCOUNTER
Kait Please tell her the tremor could relate to her neuropathy. If it continues/worsens she should schedule a follow up visit with me. Also tell her not to use alcohol any longer as could interact with her pain medications. Thanks MARCOS Sher

## 2020-08-26 NOTE — TELEPHONE ENCOUNTER
[facility] Call Center    Phone Message    May a detailed message be left on voicemail: no     Reason for Call: Other: Pt reports a tremor in her hands mainly when she is holding objects starting this morning. This is a new symptom for her. Pt would like a call back to discuss.     Action Taken: Message routed to:  Adult Clinics: Neurology p [id number]    Travel Screening: Not Applicable

## 2020-08-26 NOTE — TELEPHONE ENCOUNTER
The pt is scheduled to see Dr Chua on 9/9 and will bring this up in her appt.   She verbalized understanding and had no further questions at this time.  Kait Ambriz, RNCC  Neurology

## 2020-08-27 ENCOUNTER — HOSPITAL ENCOUNTER (OUTPATIENT)
Dept: OCCUPATIONAL THERAPY | Facility: CLINIC | Age: 30
Setting detail: THERAPIES SERIES
End: 2020-08-27
Attending: PHYSICIAN ASSISTANT
Payer: COMMERCIAL

## 2020-08-27 ENCOUNTER — HOSPITAL ENCOUNTER (OUTPATIENT)
Dept: PHYSICAL THERAPY | Facility: CLINIC | Age: 30
Setting detail: THERAPIES SERIES
End: 2020-08-27
Attending: PHYSICIAN ASSISTANT
Payer: COMMERCIAL

## 2020-08-27 PROCEDURE — 97110 THERAPEUTIC EXERCISES: CPT | Mod: GP | Performed by: PHYSICAL THERAPIST

## 2020-08-27 PROCEDURE — 97530 THERAPEUTIC ACTIVITIES: CPT | Mod: GO | Performed by: OCCUPATIONAL THERAPIST

## 2020-08-27 NOTE — TELEPHONE ENCOUNTER
Can we please call to see what this regarding? Thanks.    RG Puri CNP  [facility] Pain Management

## 2020-08-27 NOTE — TELEPHONE ENCOUNTER
Called pt. DANK to call back to discuss.     Kait CASSIDY, RN Care Coordinator  [facility]  Pain [facility]

## 2020-08-28 LAB — PAIN DRUG SCR UR W RPTD MEDS: NORMAL

## 2020-08-30 DIAGNOSIS — G62.9 POLYNEUROPATHY: ICD-10-CM

## 2020-08-31 DIAGNOSIS — G62.9 NEUROPATHY: ICD-10-CM

## 2020-08-31 DIAGNOSIS — M54.6 ACUTE BILATERAL THORACIC BACK PAIN: ICD-10-CM

## 2020-08-31 RX ORDER — TRAMADOL HYDROCHLORIDE 50 MG/1
TABLET ORAL
Qty: 20 TABLET | Refills: 0 | OUTPATIENT
Start: 2020-08-31

## 2020-08-31 RX ORDER — TRAZODONE HYDROCHLORIDE 50 MG/1
TABLET, FILM COATED ORAL
Qty: 90 TABLET | OUTPATIENT
Start: 2020-08-31

## 2020-09-01 ENCOUNTER — APPOINTMENT (OUTPATIENT)
Dept: LAB | Facility: CLINIC | Age: 30
End: 2020-09-01
Attending: INTERNAL MEDICINE
Payer: COMMERCIAL

## 2020-09-01 NOTE — TELEPHONE ENCOUNTER
Called Hilaria to review confirmatory UDS results, left voicemail without identifiers. ETG test is positive and quite elevated, indicating heavy drinking of alcohol. It is imperative that she stop drinking alcohol entirely as this can interfere with her medication management. I will not be able to prescribe further opioid prescriptions moving forrward. This could also effect my ability to prescribe other medications to her, including Lyrica. If she would like assistance to stop drinking we can can definitely give her some resources.     RG Puri CNP  [facility] Pain Management

## 2020-09-02 ENCOUNTER — HOME INFUSION (PRE-WILLOW HOME INFUSION) (OUTPATIENT)
Dept: PHARMACY | Facility: CLINIC | Age: 30
End: 2020-09-02

## 2020-09-02 RX ORDER — TIZANIDINE HYDROCHLORIDE 4 MG/1
4 CAPSULE, GELATIN COATED ORAL 3 TIMES DAILY PRN
Qty: 90 CAPSULE | Refills: 0 | OUTPATIENT
Start: 2020-09-02

## 2020-09-02 RX ORDER — HYDROCODONE BITARTRATE AND ACETAMINOPHEN 5; 325 MG/1; MG/1
1 TABLET ORAL EVERY 6 HOURS PRN
Qty: 60 TABLET | Refills: 0 | OUTPATIENT
Start: 2020-09-02

## 2020-09-02 NOTE — TELEPHONE ENCOUNTER
Fir tizanidine:  Routing refill request to provider for review/approval because:  Drug not on the [facility] refill protocol   Drug not active on patient's medication list    Controlled Substance Refill Request for Norco  Problem List Complete:    No     PROVIDER TO CONSIDER COMPLETION OF PROBLEM LIST AND OVERVIEW/CONTROLLED SUBSTANCE AGREEMENT    Last Written Prescription Date:  8/20/20  Last Fill Quantity: 60 tablets   # refills: 0    THE MOST RECENT OFFICE VISIT MUST BE WITHIN THE PAST 3 MONTHS. AT LEAST ONE FACE TO FACE VISIT MUST OCCUR EVERY 6 MONTHS. ADDITIONAL VISITS CAN BE VIRTUAL.  (THIS STATEMENT SHOULD BE DELETED.)    Last Office Visit with [facility] primary care provider: 6/25/20 Virtual Visit    Future Office visit: None    Controlled substance agreement:   Encounter-Level CSA:    There are no encounter-level csa.     Patient-Level CSA:    Controlled Substance Agreement - Opioid - Scan on 8/21/2020  8:26 AM         Last Urine Drug Screen:   Pain Drug SCR UR W RPTD Meds   Date Value Ref Range Status   08/20/2020 FINAL  Final     Comment:     (Note)  ====================================================================  TOXASSURE COMP DRUG ANALYSIS,UR  ETG/ETS, ToxAssure Add  ====================================================================  Test                             Result       Flag       Units        Drug Present and Declared for Prescription Verification   Pregabalin                     PRESENT      EXPECTED                 Diphenhydramine                PRESENT      EXPECTED                Drug Present not Declared for Prescription Verification   Alcohol, Ethyl                 0.100        UNEXPECTED g/dL          Ethyl Glucuronide              >10484       UNEXPECTED ng/mg creat   Ethyl Sulfate                  83230        UNEXPECTED ng/mg creat    Sources of ethyl alcohol include alcoholic beverages or as a    fermentation product of glucose; glucose was not detected in this    specimen.  EtG and EtS  are metabolites of ethyl alcohol; EtG may    be a fermentation product of glucose, but EtS is not known to be    formed by fermentation.  Incidental exposure to alcohol may    result in detectable levels of EtG and/or EtS.  Ethyl alcohol and    EtG/EtS results should be interpreted in the context of all    available clinical and behavioral information.   Tramadol                       319          UNEXPECTED ng/mg creat   O-Desmethyltramadol            323          UNEXPECTED ng/mg creat   N-Desmethyltramadol            211          UNEXPECTED ng/mg creat    Source of tramadol is a prescription medication.    O-desmethyltramadol and N-desmethyltramadol are expected    metabolites of tramadol.   Amitriptyline                  PRESENT      UNEXPECTED               Nortriptyline                  PRESENT      UNEXPECTED                Nortriptyline may be administered as a prescription drug; it is    also an expected metabolite of amitriptyline.   Acetaminophen                  PRESENT      UNEXPECTED              Drug Absent but Declared for Prescription Verification   Duloxetine                     Not Detected UNEXPECTED               Hydroxyzine                    Not Detected UNEXPECTED              ====================================================================  Test                      Result    Flag   Units      Ref Range        Creatinine              57               mg/dL      >=20            ====================================================================  Declared Medications:  The flagging and interpretation on this report are based on the  following declared medications.  Unexpected results may arise from  inaccuracies in the declared medications.  **Note: The testing scope of this panel includes these medications:  Diphenhydramine (Benadryl)  Duloxetine (Cymbalta)  Hydroxyzine (Atarax)  Pregabalin (Lyrica)  **Note: The testing scope of this panel does not include following  reported  medications:  Cholecalciferol  Cyanocobalamin  Epinephrine  Folic acid  Hydrocortisone (Solu-Cortef)  Immune Globulin (Privigen)  Lisinopril (Zestril)  Medroxyprogesterone (Depo-Provera)  Melatonin  Multivitamin  Polyethylene Glycol (MiraLAX)  Rivaroxaban  Topical (Preparation H)  Vitamin B  Vitamin C  Vitamin D3  ====================================================================  For clinical consultation, please call [phone].  ====================================================================  Analysis performed by [facility], [address] [place] [address]     , No results found for: COMDAT, No results found for: THC13, PCP13, COC13, MAMP13, OPI13, AMP13, BZO13, TCA13, MTD13, BAR13, OXY13, PPX13, BUP13     Processing:  Rx to be electronically transmitted to pharmacy by provider      [email or web address]       checked in past 3 months?  No-problem list incomplete so  not checked.           Ayana Tay RN, BSN, PHN

## 2020-09-02 NOTE — TELEPHONE ENCOUNTER
Message to pharmacy - tizanidine refill 0 follow up appointment needed.  Has not had script since Jan.  Hydrocodone given by pain clinic - follow up with them for this refill.    PSK

## 2020-09-03 ENCOUNTER — HOSPITAL ENCOUNTER (OUTPATIENT)
Dept: PHYSICAL THERAPY | Facility: CLINIC | Age: 30
Setting detail: THERAPIES SERIES
End: 2020-09-03
Attending: PHYSICIAN ASSISTANT
Payer: COMMERCIAL

## 2020-09-03 ENCOUNTER — TELEPHONE (OUTPATIENT)
Dept: NEUROLOGY | Facility: CLINIC | Age: 30
End: 2020-09-03

## 2020-09-03 ENCOUNTER — HOSPITAL ENCOUNTER (OUTPATIENT)
Dept: OCCUPATIONAL THERAPY | Facility: CLINIC | Age: 30
Setting detail: THERAPIES SERIES
End: 2020-09-03
Attending: PHYSICIAN ASSISTANT
Payer: COMMERCIAL

## 2020-09-03 PROCEDURE — 97530 THERAPEUTIC ACTIVITIES: CPT | Mod: GO | Performed by: OCCUPATIONAL THERAPIST

## 2020-09-03 PROCEDURE — 97750 PHYSICAL PERFORMANCE TEST: CPT | Mod: GP | Performed by: PHYSICAL THERAPIST

## 2020-09-03 PROCEDURE — 97116 GAIT TRAINING THERAPY: CPT | Mod: GP | Performed by: PHYSICAL THERAPIST

## 2020-09-03 PROCEDURE — 97110 THERAPEUTIC EXERCISES: CPT | Mod: GP | Performed by: PHYSICAL THERAPIST

## 2020-09-03 NOTE — PROGRESS NOTES
Functional Gait Assessment (FGA): The FGA assesses postural stability during various walking tasks.   Gait assistive device used: None    Patient Score: 17/30  Scores of <22/30 have been correlated with predicting falls in community-dwelling older adults according to Gail & Jesus Manuel 2010.   Scores of <18/30 have been correlated with increased risk for falls in patients with Parkinsons Disease according to Santi Lucero Zhou et al 2014.  Minimal Detectable Change for patients with acute/chronic stroke = 4.2 according to Dmitry & Ryleeschel 2009  Minimal Detectable Change for patients with vestibular disorder = 8 according to Gail & Jesus Manuel 2010    Assessment (rationale for performing, application to patient s function & care plan): Performed FGA to assess her falls risk and balance with gait. Patient is scoring at risk of falls d/t her imbalance and neuropathy and will continue to benefit from PT address these areas of impairment.   Minutes billed as physical performance test: 10         09/03/20 1300   Signing Clinician's Name / Credentials   Signing clinician's name / credentials Ena Aliica, PT, DPT   Functional Gait Assessment (ELICIA Reynolds., Traci, G. F., et al. (2004))   1. GAIT LEVEL SURFACE 2   2. CHANGE IN GAIT SPEED 2   3. GAIT WITH HORIZONTAL HEAD TURNS 1   4. GAIT WITH VERTICAL HEAD TURNS 2   5. GAIT AND PIVOT TURN 2   6. STEP OVER OBSTACLE 1   7. GAIT WITH NARROW BASE OF SUPPORT 1   8. GAIT WITH EYES CLOSED 2   9. AMBULATING BACKWARDS 2   10. STEPS 2   Total Functional Gait Assessment Score   TOTAL SCORE: (MAXIMUM SCORE 30) 17

## 2020-09-03 NOTE — TELEPHONE ENCOUNTER
I called pt and left a voicemail. She sent a mychart message she wanted to speak with Dr. Chua's nurse. I let her know I am covering for [place] today and she can call me back with her update. She can also send a [employer] message with the information she wants passed along to the provider.     I let her know [place] is back on Monday.     Mariann CAAL

## 2020-09-03 NOTE — PROGRESS NOTES
This is a recent snapshot of the patient's [facility] medical record.  For current drug dose and complete information and questions, call [phone] or In Basket pool, fv home infusion ([id number])  CSN Number:  [id number]

## 2020-09-04 ENCOUNTER — HOME INFUSION (PRE-WILLOW HOME INFUSION) (OUTPATIENT)
Dept: PHARMACY | Facility: CLINIC | Age: 30
End: 2020-09-04

## 2020-09-04 NOTE — TELEPHONE ENCOUNTER
This refill is not appropriate.   She is working with pain clinic and should reach out to them for her pain medicines as they direct

## 2020-09-04 NOTE — TELEPHONE ENCOUNTER
Patient is requesting refill for acetaminophen-codeine (TYLENOL #3) 300-30 MG tablet (Discontinued).          Kj Faarax  [facility]

## 2020-09-04 NOTE — TELEPHONE ENCOUNTER
Routing refill request to provider for review/approval because:  Drug not active on patient's medication list  Tiffanie Lr RN  [facility]

## 2020-09-05 ENCOUNTER — DOCUMENTATION ONLY (OUTPATIENT)
Dept: PHARMACY | Facility: CLINIC | Age: 30
End: 2020-09-05

## 2020-09-05 NOTE — PROGRESS NOTES
Late Entry:  Skilled Nurse visit on Friday 9/4/20 in the  patient home  to administer privigen.  No recent elevated temperature, fever, chills, productive cough, coughing for 3 weeks or longer or hemoptysis, abnormal vital signs, night sweats, chest pain. No  decrease in your appetite, unexplained weight loss or fatigue.  No other new onset medical symptoms.  Current weight 251 lbs.  PIV placed right wrist, 1 attempt.  Pre medicated with benadryl, tylenol and hydrocortisone,  Labs drawn - none.   Infusion completed without complication or reaction. Pt reports that therapy has not changes symptoms  related to diagnosis.  Patient does verbalize that she continues to have occasional falls.  This has not been witnessed during infusion visit.  Per patient she does have appointment on 9/9/20 to address.      Jaqueline Dennis RN  [facility]  [email or web address]

## 2020-09-08 NOTE — PROGRESS NOTES
This is a recent snapshot of the patient's [facility] medical record.  For current drug dose and complete information and questions, call [phone] or In Basket pool, fv home infusion ([id number])  CSN Number:  [id number]

## 2020-09-09 ENCOUNTER — OFFICE VISIT (OUTPATIENT)
Dept: NEUROLOGY | Facility: CLINIC | Age: 30
End: 2020-09-09
Payer: COMMERCIAL

## 2020-09-09 VITALS — OXYGEN SATURATION: 100 % | DIASTOLIC BLOOD PRESSURE: 74 MMHG | SYSTOLIC BLOOD PRESSURE: 125 MMHG | HEART RATE: 97 BPM

## 2020-09-09 DIAGNOSIS — G61.81 CIDP (CHRONIC INFLAMMATORY DEMYELINATING POLYNEUROPATHY) (H): Primary | ICD-10-CM

## 2020-09-09 ASSESSMENT — PAIN SCALES - GENERAL: PAINLEVEL: WORST PAIN (10)

## 2020-09-09 NOTE — TELEPHONE ENCOUNTER
9/9 at 0819:    Pt returning call to nursing.     Clare MCCARTHY    [facility]

## 2020-09-09 NOTE — LETTER
[facility] NEUROLOGY  [address] [place] [address]  Phone: [phone]  Fax: [phone]    September 14, 2020        Hilaria Bonner  [address]  [address]  [place] [address]          To whom it may concern:    Hilaria Bonner is a patient under my care at the [facility]. Please excuse her absence from work while she undergoes evaluation and treatment for her condition. Details of her condition can be found in my clinical records.      Please contact me for questions or concerns.      Sincerely,        Travon Chua MD

## 2020-09-09 NOTE — LETTER
9/9/2020       RE: Hilaria Bonner  [address]  [address]  [place] [address]     Dear Colleague,    Thank you for referring your patient, Hilaria Bonner, to the [facility] NEUROLOGY at [facility]. Please see a copy of my visit note below.    Note opened in error.       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  In April 2020 she developed confirmed COVID infection. About 4 weeks later her neurologic symptoms began. Her first symptom was tingling in her hands where it then  progressed to a burning in her hands about 4-5 days after the numbness.  It then quickly progressed to involving bilateral legs below the knee and then her feet. She felt weak in her hands and feet. Fine motor tasks and gait became impaired. She has trouble picking up objects because she has to watch herself  objects. She needed a walker. She also felt that her speech became periodically slurred. The sensory symptoms that included pain and allodynia were most problematic in her hands and feet. NCS in 7/20 showed absent sensory responses in the upper and lower limbs and normal motor responses. In 8/20 IVIG 2 gm/kg loading and then 1 gm/kg q 3 week maintenance was started.     Interval History:   We last saw her in clinic 7/28/20. Since then we repeat the sensory NCS and confirmed that they were absent or attenuated throughout her upper and lower limbs.  IVIG was then started in August 2020 with a loading dose over 3 days. Her last maintenance dose was on 9/4/20. Next dose is scheduled for 9/23. She is tolerating IVIG well. She does feel her walking is slightly better. She is now using walker for only long distances. She stands from a seated position easier. She feels her fine motor coordination in the upper limbs is  "stable. She notes the pain and numbness is the limiting factor to her function in the upper limbs. She also stated he has \"spasms\" in her hands and feet. She feels her hands stiffen with temperature changes. She describes intermittent blurry and double vision which improves with blinking. She follows in the pain clinic. Lyrica was recently increased to 200mg TID. She is also on Cymbalta 60mg daily and is scheduled to go up to 90mg in 1 week. She also takes Norco q 6 hr for pain.                                                                                   Prior pertinent laboratory work-up:  5/2020:  ANH 1:160. Vitamin B1 low (45). B12 low/normal (285). Negative/normal double stranded DNA, ANCA, C-reactive, RF, GM1, GD1, Gq1b, vitamin A, B6, Vit E, SSA, SSB undetectable.  6/2020 CSF: 0 WBC, 0 RBC, protein 58 glucose 29.  7/2020: Normal Vitamin B1, B12, folate    8/2020: TS-HDS [place] elevated at [address] (N<69499). Negative FGFR3, Immunofixation, paraneoplastic panel,GD1a.     Prior electrophysiologic work-up:  7/23/20 NCS/EMG showed all absent upper and lower limb SNAPS and all normal upper and lower limb motor responses.   8/3/20 NCS/EMG showed absent right  median, ulnar, and sural sensory studies with radial having attenuated amplitude.      Prior pertinent imaging work-up:  5/20: MRI brain with and without contrast was essentially normal  5/20: MRI C spine with and without contrast showed no abnormal enhancement in the spinal cord, thecal sac or cervical vertebrae. No spinal canal or neural foraminal narrowing.    Past Medical History:   Past Medical History:   Diagnosis Date     Acute kidney injury (H) 05/13/2019     Cardiac arrest (H) 05/13/2019     Earache or other ear, nose, or throat complaint 12/15    tyroid     Pulmonary embolus (H) 05/13/2019     Past Surgical History:  Past Surgical History:   Procedure Laterality Date     GYN SURGERY      2 C-sections     KNEE SURGERY  most recently - 0310-2056    3 " surgeries in [place]     LAPAROSCOPIC GASTRIC SLEEVE N/A 3/26/2019    Procedure: Laparoscopic Sleeve Gastrectomy;  Surgeon: Luan Lopez MD;  Location: [facility] OR     ORTHOPEDIC SURGERY      2 knee meniscus surgery     Family history:    There is no known family history of hereditary neuropathies or other neuromuscular disorders.     Social History:    Social History     Tobacco Use     Smoking status: Former Smoker     Packs/day: 0.30     Years: 1.00     Pack years: 0.30     Types: Cigarettes     Start date: 2016     Last attempt to quit: 2018     Years since quittin.6     Smokeless tobacco: Never Used   Substance Use Topics     Alcohol use: Not Currently     Alcohol/week: 0.0 standard drinks     Comment: occasional     Drug use: No      Medical Allergies:   No Known Allergies     Current Medications:    Current Outpatient Medications:      cholecalciferol 50 MCG (2000 UT) tablet, Take 1 tablet by mouth daily, Disp: 90 tablet, Rfl: 3     diphenhydrAMINE (BENADRYL) 50 MG/ML injection, , Disp: , Rfl:      DULoxetine (CYMBALTA) 30 MG capsule, Take 3 capsules (90 mg) by mouth daily, Disp: 60 capsule, Rfl: 3     EPINEPHrine (ANY BX GENERIC EQUIV) 0.3 MG/0.3ML injection 2-pack, , Disp: , Rfl:      folic acid (FOLVITE) 1 MG tablet, Take 1 tablet (1 mg) by mouth daily, Disp: 30 tablet, Rfl: 11     HYDROcodone-acetaminophen (NORCO) 5-325 MG tablet, Take 1 tablet by mouth every 6 hours as needed for severe pain Max of 2 tabs/day. Fill date and start date of 2020., Disp: 60 tablet, Rfl: 0     hydrOXYzine (ATARAX) 25 MG tablet, Take 1-2 tablets (25-50 mg) by mouth every 6 hours as needed for other (adjuvant pain), Disp: 150 tablet, Rfl: 0     lisinopril (ZESTRIL) 10 MG tablet, Take 1 tablet (10 mg) by mouth daily, Disp: 90 tablet, Rfl: 1     melatonin 1 MG TABS tablet, Take 1 tablet (1 mg) by mouth nightly as needed for sleep, Disp:  , Rfl:      Multiple Vitamins-Minerals (MULTIVITAMIN ADULTS) TABS, Take 1  tablet by mouth daily, Disp: , Rfl:      phenylephrine-shark liver oil-mineral oil-petrolatum (PREPARATION H) 0.25-14-74.9 % rectal ointment, Place rectally 2 times daily as needed for hemorrhoids, Disp:  , Rfl:      polyethylene glycol (MIRALAX) 17 GM/SCOOP powder, Take 17 g (1 capful) by mouth daily, Disp: 850 g, Rfl: 3     Pregabalin (LYRICA) 200 MG capsule, Take 1 capsule (200 mg) by mouth 3 times daily, Disp: 90 capsule, Rfl: 3     PRIVIGEN 40 GM/400ML SOLN, , Disp: , Rfl:      rivaroxaban ANTICOAGULANT (XARELTO ANTICOAGULANT) 20 MG TABS tablet, Take 1 tablet (20 mg) by mouth daily (with dinner), Disp: 90 tablet, Rfl: 3     SOLU-CORTEF 100 MG injection, , Disp: , Rfl:      vitamin (B COMPLEX) tablet, Take 1 tablet by mouth daily, Disp: 90 tablet, Rfl: 3     vitamin C (ASCORBIC ACID) 1000 MG TABS, Take 1 tablet (1,000 mg) by mouth daily, Disp: 30 tablet, Rfl: 0     vitamin D3 (CHOLECALCIFEROL) 2000 units (50 mcg) tablet, Take 1 tablet (2,000 Units) by mouth daily, Disp: 30 tablet, Rfl: 0    Current Facility-Administered Medications:      cyanocobalamin injection 1,000 mcg, 1,000 mcg, Intramuscular, Q30 Days, Crissy Madrigal PA-C, 1,000 mcg at 07/27/20 1128     medroxyPROGESTERone (DEPO-PROVERA) syringe 150 mg, 150 mg, Intramuscular, Q90 Days, Bertha Montano NP, 150 mg at 03/18/20 1151    Review of Systems: A complete review of systems was obtained and was negative except for what was noted above.     Physical examination:    /74 (BP Location: Right arm, Patient Position: Sitting, Cuff Size: Adult Large)   Pulse 97   SpO2 100%     General Appearance: NAD    Skin: There are no rashes or other skin lesions.    Musculoskeletal:  There is no scoliosis, lordosis, kyphosis, pes cavus, or hammertoes.    Neurologic examination:    Mental status:  Patient is alert, attentive, and oriented x 3.  Language is coherent and fluent without aphasia.  Memory, comprehension and ability to follow commands were intact.        Cranial nerves:   Pupils were round and reacted to light.  Extraocular movements were full. There was no face, jaw, palate or tongue weakness or atrophy. Hearing was grossly intact.  Shoulder shrug was normal.       Motor exam: No atrophy or fasciculations.   Manual muscle testing revealed the following MRC grade muscle power:   Right Left   Neck flexion 5    Neck extension: 5    Shoulder abduction:  5 5   Elbow extension: 5 5   Elbow flexion:  5 5   Wrist flexion:  5 5   Wrist extension:  5 5   FDI 4+ 4+   Hip flexion 5 5   Knee flexion 5 5   Knee extension 5 5   Dorsiflexion 5 5   Plantar flexion 5 5   Green indicates improved compared to last exam  Red indicates worse compared to last exam    Complex motor skills: No tremor but there is moderate ataxia with FNF. Pseudoathetotic movements in hands.     Sensory exam: Reduced pin throughout arms and legs. Vibration reduced in the fingers, ankles and toes.      Gait: Wide based, ataxic    Deep tendon reflexes:   Right Left   Triceps 1 1   Biceps 1 1   Brachioradialis 2 2   Knee jerk 0 0   Ankle jerk 0 0   Plantar responses were flexor bilaterally.      Neuropathy Assessments  Neurology Assessments 2020 8/3/2020   RODS CIDP/MGUSP Score 16 20   Time for 'Up and Go' test- Seconds: 15.9 24.09      Strength: 2020 8/3/2020   Right hand strength in K 16   Left hand strength in K 20     Immunotherapy 2020 8/3/2020   Time since last IVIG (days): 5 days -   Current treatment: IVIG 1 gm/kg q 3 weeks none     Assessment:    Hilaria Bonner is a 29 year old woman with an acute onset, non-length dependant sensory neuropathy or ganglionopathy. 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  The evolution and pattern of the neuropathy or neuronopathy is strongly suggestive of an immune mediated mechanism. The onset of the neuropathy relative to the timing of COVID19 highly suggests that COVID19 triggered the  presumed immune mediated disorder in a similar way that other antecedent infections are known to trigger Guillain-Roselle Syndrome.       Plan:      1. IVIG: Continue maintenance therapy of 1g/kg q 3 weeks  2. Cramps: Maintain adequate hydration and stretching   3. Pain: Continue follow up with pain clinic. Appreciate collateral care.   4. Gait: continue physical therapy  5. Follow up in 2 months. Sooner if needed.     Seen and discussed with Dr. Chua. His addendum follows.     Víctor Cm MD  Neuromuscular Fellow  ----  ADDENDUM:   I personally interviewed and examined the patient. I agree with the history, physical, assessment, and plan as documented above. 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 Although neuropathy due to nutritional deficiencies has also been considered, this is less likely. The presence of a TS-HDS antibody is of uncertain significance, but may support neurologic disimmunity. We discussed the prognosis of immune mediated sensory ganglionopathies, and in particular the likelihood of irreversible deficits even with immunotherapy. That said, since initiating IVIG there is some objective evidence of benefit even at this early stage. Although I-RODS is a bit worse (probably due to pain) her  strength and especially TUG time has improved. We will continue the course with IVIG + supportive care as detailed above.     Travon Chua MD                   Again, thank you for allowing me to participate in the care of your patient.      Sincerely,    Travon Chua MD

## 2020-09-09 NOTE — LETTER
9/9/2020       RE: Hilaria Bonner  [address]  [address]  [facility] [address]     Dear Colleague,    Thank you for referring your patient, Hilaria Bonner, to the [facility]OLOG[facility] at [facility]. Please see a copy of my visit note below.    Note opened in error.       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  In April 2020 she developed confirmed COVID infection. About 4 weeks later her neurologic symptoms began. Her first symptom was tingling in her hands where it then  progressed to a burning in her hands about 4-5 days after the numbness.  It then quickly progressed to involving bilateral legs below the knee and then her feet. She felt weak in her hands and feet. Fine motor tasks and gait became impaired. She has trouble picking up objects because she has to watch herself  objects. She needed a walker. She also felt that her speech became periodically slurred. The sensory symptoms that included pain and allodynia were most problematic in her hands and feet. NCS in 7/20 showed absent sensory responses in the upper and lower limbs and normal motor responses. In 8/20 IVIG 2 gm/kg loading and then 1 gm/kg q 3 week maintenance was started.     Interval History:   We last saw her in clinic 7/28/20. Since then we repeat the sensory NCS and confirmed that they were absent or attenuated throughout her upper and lower limbs.  IVIG was then started in August 2020 with a loading dose over 3 days. Her last maintenance dose was on 9/4/20. Next dose is scheduled for 9/23. She is tolerating IVIG well. She does feel her walking is slightly better. She is now using walker for only long distances. She stands from a seated position easier. She feels her fine motor coordination in the upper limbs is  "stable. She notes the pain and numbness is the limiting factor to her function in the upper limbs. She also stated he has \"spasms\" in her hands and feet. She feels her hands stiffen with temperature changes. She describes intermittent blurry and double vision which improves with blinking. She follows in the pain clinic. Lyrica was recently increased to 200mg TID. She is also on Cymbalta 60mg daily and is scheduled to go up to 90mg in 1 week. She also takes Norco q 6 hr for pain.                                                                                   Prior pertinent laboratory work-up:  5/2020:  ANH 1:160. Vitamin B1 low (45). B12 low/normal (285). Negative/normal double stranded DNA, ANCA, C-reactive, RF, GM1, GD1, Gq1b, vitamin A, B6, Vit E, SSA, SSB undetectable.  6/2020 CSF: 0 WBC, 0 RBC, protein 58 glucose 29.  7/2020: Normal Vitamin B1, B12, folate    8/2020: TS-H[place] elevated at [address] ([id number]). Negative FGFR3, Immunofixation, paraneoplastic panel,GD1a.     Prior electrophysiologic work-up:  7/23/20 NCS/EMG showed all absent upper and lower limb SNAPS and all normal upper and lower limb motor responses.   8/3/20 NCS/EMG showed absent right  median, ulnar, and sural sensory studies with radial having attenuated amplitude.      Prior pertinent imaging work-up:  5/20: MRI brain with and without contrast was essentially normal  5/20: MRI C spine with and without contrast showed no abnormal enhancement in the spinal cord, thecal sac or cervical vertebrae. No spinal canal or neural foraminal narrowing.    Past Medical History:   Past Medical History:   Diagnosis Date     Acute kidney injury (H) 05/13/2019     Cardiac arrest (H) 05/13/2019     Earache or other ear, nose, or throat complaint 12/15    tyroid     Pulmonary embolus (H) 05/13/2019     Past Surgical History:  Past Surgical History:   Procedure Laterality Date     GYN SURGERY      2 C-sections     KNEE SURGERY  most recently - [id number]    3 " surgeries in [place]     LAPAROSCOPIC GASTRIC SLEEVE N/A 3/26/2019    Procedure: Laparoscopic Sleeve Gastrectomy;  Surgeon: Luan Lopez MD;  Location: [facility] OR     ORTHOPEDIC SURGERY      2 knee meniscus surgery     Family history:    There is no known family history of hereditary neuropathies or other neuromuscular disorders.     Social History:    Social History     Tobacco Use     Smoking status: Former Smoker     Packs/day: 0.30     Years: 1.00     Pack years: 0.30     Types: Cigarettes     Start date: 2016     Last attempt to quit: 2018     Years since quittin.6     Smokeless tobacco: Never Used   Substance Use Topics     Alcohol use: Not Currently     Alcohol/week: 0.0 standard drinks     Comment: occasional     Drug use: No      Medical Allergies:   No Known Allergies     Current Medications:    Current Outpatient Medications:      cholecalciferol 50 MCG (2000 UT) tablet, Take 1 tablet by mouth daily, Disp: 90 tablet, Rfl: 3     diphenhydrAMINE (BENADRYL) 50 MG/ML injection, , Disp: , Rfl:      DULoxetine (CYMBALTA) 30 MG capsule, Take 3 capsules (90 mg) by mouth daily, Disp: 60 capsule, Rfl: 3     EPINEPHrine (ANY BX GENERIC EQUIV) 0.3 MG/0.3ML injection 2-pack, , Disp: , Rfl:      folic acid (FOLVITE) 1 MG tablet, Take 1 tablet (1 mg) by mouth daily, Disp: 30 tablet, Rfl: 11     HYDROcodone-acetaminophen (NORCO) 5-325 MG tablet, Take 1 tablet by mouth every 6 hours as needed for severe pain Max of 2 tabs/day. Fill date and start date of 2020., Disp: 60 tablet, Rfl: 0     hydrOXYzine (ATARAX) 25 MG tablet, Take 1-2 tablets (25-50 mg) by mouth every 6 hours as needed for other (adjuvant pain), Disp: 150 tablet, Rfl: 0     lisinopril (ZESTRIL) 10 MG tablet, Take 1 tablet (10 mg) by mouth daily, Disp: 90 tablet, Rfl: 1     melatonin 1 MG TABS tablet, Take 1 tablet (1 mg) by mouth nightly as needed for sleep, Disp:  , Rfl:      Multiple Vitamins-Minerals (MULTIVITAMIN ADULTS) TABS, Take 1  tablet by mouth daily, Disp: , Rfl:      phenylephrine-shark liver oil-mineral oil-petrolatum (PREPARATION H) 0.25-14-74.9 % rectal ointment, Place rectally 2 times daily as needed for hemorrhoids, Disp:  , Rfl:      polyethylene glycol (MIRALAX) 17 GM/SCOOP powder, Take 17 g (1 capful) by mouth daily, Disp: 850 g, Rfl: 3     Pregabalin (LYRICA) 200 MG capsule, Take 1 capsule (200 mg) by mouth 3 times daily, Disp: 90 capsule, Rfl: 3     PRIVIGEN 40 GM/400ML SOLN, , Disp: , Rfl:      rivaroxaban ANTICOAGULANT (XARELTO ANTICOAGULANT) 20 MG TABS tablet, Take 1 tablet (20 mg) by mouth daily (with dinner), Disp: 90 tablet, Rfl: 3     SOLU-CORTEF 100 MG injection, , Disp: , Rfl:      vitamin (B COMPLEX) tablet, Take 1 tablet by mouth daily, Disp: 90 tablet, Rfl: 3     vitamin C (ASCORBIC ACID) 1000 MG TABS, Take 1 tablet (1,000 mg) by mouth daily, Disp: 30 tablet, Rfl: 0     vitamin D3 (CHOLECALCIFEROL) 2000 units (50 mcg) tablet, Take 1 tablet (2,000 Units) by mouth daily, Disp: 30 tablet, Rfl: 0    Current Facility-Administered Medications:      cyanocobalamin injection 1,000 mcg, 1,000 mcg, Intramuscular, Q30 Days, Crissy Madrigal PA-C, 1,000 mcg at 07/27/20 1128     medroxyPROGESTERone (DEPO-PROVERA) syringe 150 mg, 150 mg, Intramuscular, Q90 Days, Bertha Montano NP, 150 mg at 03/18/20 1151    Review of Systems: A complete review of systems was obtained and was negative except for what was noted above.     Physical examination:    /74 (BP Location: Right arm, Patient Position: Sitting, Cuff Size: Adult Large)   Pulse 97   SpO2 100%     General Appearance: NAD    Skin: There are no rashes or other skin lesions.    Musculoskeletal:  There is no scoliosis, lordosis, kyphosis, pes cavus, or hammertoes.    Neurologic examination:    Mental status:  Patient is alert, attentive, and oriented x 3.  Language is coherent and fluent without aphasia.  Memory, comprehension and ability to follow commands were intact.        Cranial nerves:   Pupils were round and reacted to light.  Extraocular movements were full. There was no face, jaw, palate or tongue weakness or atrophy. Hearing was grossly intact.  Shoulder shrug was normal.       Motor exam: No atrophy or fasciculations.   Manual muscle testing revealed the following MRC grade muscle power:   Right Left   Neck flexion 5    Neck extension: 5    Shoulder abduction:  5 5   Elbow extension: 5 5   Elbow flexion:  5 5   Wrist flexion:  5 5   Wrist extension:  5 5   FDI 4+ 4+   Hip flexion 5 5   Knee flexion 5 5   Knee extension 5 5   Dorsiflexion 5 5   Plantar flexion 5 5   Green indicates improved compared to last exam  Red indicates worse compared to last exam    Complex motor skills: No tremor but there is moderate ataxia with FNF. Pseudoathetotic movements in hands.     Sensory exam: Reduced pin throughout arms and legs. Vibration reduced in the fingers, ankles and toes.      Gait: Wide based, ataxic    Deep tendon reflexes:   Right Left   Triceps 1 1   Biceps 1 1   Brachioradialis 2 2   Knee jerk 0 0   Ankle jerk 0 0   Plantar responses were flexor bilaterally.      Neuropathy Assessments  Neurology Assessments 2020 8/3/2020   RODS CIDP/MGUSP Score 16 20   Time for 'Up and Go' test- Seconds: 15.9 24.09      Strength: 2020 8/3/2020   Right hand strength in K 16   Left hand strength in K 20     Immunotherapy 2020 8/3/2020   Time since last IVIG (days): 5 days -   Current treatment: IVIG 1 gm/kg q 3 weeks none     Assessment:    Hilaria Bonner is a 29 year old woman with an acute onset, non-length dependant sensory neuropathy or ganglionopathy. 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  The evolution and pattern of the neuropathy or neuronopathy is strongly suggestive of an immune mediated mechanism. The onset of the neuropathy relative to the timing of COVID19 highly suggests that COVID19 triggered the  presumed immune mediated disorder in a similar way that other antecedent infections are known to trigger Guillain-Cambridge Syndrome.       Plan:      1. IVIG: Continue maintenance therapy of 1g/kg q 3 weeks  2. Cramps: Maintain adequate hydration and stretching   3. Pain: Continue follow up with pain clinic. Appreciate collateral care.   4. Gait: continue physical therapy  5. Follow up in 2 months. Sooner if needed.     Seen and discussed with Dr. Chua. His addendum follows.     Víctor Cm MD  Neuromuscular Fellow  ----  ADDENDUM:   I personally interviewed and examined the patient. I agree with the history, physical, assessment, and plan as documented above. 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 Although neuropathy due to nutritional deficiencies has also been considered, this is less likely. The presence of a TS-HDS antibody is of uncertain significance, but may support neurologic disimmunity. We discussed the prognosis of immune mediated sensory ganglionopathies, and in particular the likelihood of irreversible deficits even with immunotherapy. That said, since initiating IVIG there is some objective evidence of benefit even at this early stage. Although I-RODS is a bit worse (probably due to pain) her  strength and especially TUG time has improved. We will continue the course with IVIG + supportive care as detailed above.     Travon Chua MD                     Again, thank you for allowing me to participate in the care of your patient.      Sincerely,    Travon Chua MD

## 2020-09-09 NOTE — PROGRESS NOTES
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  In April 2020 she developed confirmed COVID infection. About 4 weeks later her neurologic symptoms began. Her first symptom was tingling in her hands where it then  progressed to a burning in her hands about 4-5 days after the numbness.  It then quickly progressed to involving bilateral legs below the knee and then her feet. She felt weak in her hands and feet. Fine motor tasks and gait became impaired. She has trouble picking up objects because she has to watch herself  objects. She needed a walker. She also felt that her speech became periodically slurred. The sensory symptoms that included pain and allodynia were most problematic in her hands and feet. NCS in 7/20 showed absent sensory responses in the upper and lower limbs and normal motor responses. In 8/20 IVIG 2 gm/kg loading and then 1 gm/kg q 3 week maintenance was started.     Interval History:   We last saw her in clinic 7/28/20. Since then we repeat the sensory NCS and confirmed that they were absent or attenuated throughout her upper and lower limbs.  IVIG was then started in August 2020 with a loading dose over 3 days. Her last maintenance dose was on 9/4/20. Next dose is scheduled for 9/23. She is tolerating IVIG well. She does feel her walking is slightly better. She is now using walker for only long distances. She stands from a seated position easier. She feels her fine motor coordination in the upper limbs is stable. She notes the pain and numbness is the limiting factor to her function in the upper limbs. She also stated he has \"spasms\" in her hands and feet. She feels her hands stiffen with temperature changes. She describes intermittent blurry and double vision which improves with blinking. She follows in the pain clinic. " Lyrica was recently increased to 200mg TID. She is also on Cymbalta 60mg daily and is scheduled to go up to 90mg in 1 week. She also takes Norco q 6 hr for pain.                                                                                   Prior pertinent laboratory work-up:  5/2020:  ANH 1:160. Vitamin B1 low (45). B12 low/normal (285). Negative/normal double stranded DNA, ANCA, C-reactive, RF, GM1, GD1, Gq1b, vitamin A, B6, Vit E, SSA, SSB undetectable.  6/2020 CSF: 0 WBC, 0 RBC, protein 58 glucose 29.  7/2020: Normal Vitamin B1, B12, folate    8/2020: TS-HDS mildly elevated at [address] ([id number]). Negative FGFR3, Immunofixation, paraneoplastic panel,GD1a.     Prior electrophysiologic work-up:  7/23/20 NCS/EMG showed all absent upper and lower limb SNAPS and all normal upper and lower limb motor responses.   8/3/20 NCS/EMG showed absent right  median, ulnar, and sural sensory studies with radial having attenuated amplitude.      Prior pertinent imaging work-up:  5/20: MRI brain with and without contrast was essentially normal  5/20: MRI C spine with and without contrast showed no abnormal enhancement in the spinal cord, thecal sac or cervical vertebrae. No spinal canal or neural foraminal narrowing.    Past Medical History:   Past Medical History:   Diagnosis Date     Acute kidney injury (H) 05/13/2019     Cardiac arrest (H) 05/13/2019     Earache or other ear, nose, or throat complaint 12/15    tyroid     Pulmonary embolus (H) 05/13/2019     Past Surgical History:  Past Surgical History:   Procedure Laterality Date     GYN SURGERY      2 C-sections     KNEE SURGERY  most recently - [id number]    3 surgeries in [place]     LAPAROSCOPIC GASTRIC SLEEVE N/A 3/26/2019    Procedure: Laparoscopic Sleeve Gastrectomy;  Surgeon: Luan Lopez MD;  Location: [facility] OR     ORTHOPEDIC SURGERY      2 knee meniscus surgery     Family history:    There is no known family history of hereditary neuropathies or other neuromuscular  disorders.     Social History:    Social History     Tobacco Use     Smoking status: Former Smoker     Packs/day: 0.30     Years: 1.00     Pack years: 0.30     Types: Cigarettes     Start date: 2016     Last attempt to quit: 2018     Years since quittin.6     Smokeless tobacco: Never Used   Substance Use Topics     Alcohol use: Not Currently     Alcohol/week: 0.0 standard drinks     Comment: occasional     Drug use: No      Medical Allergies:   No Known Allergies     Current Medications:    Current Outpatient Medications:      cholecalciferol 50 MCG (2000 UT) tablet, Take 1 tablet by mouth daily, Disp: 90 tablet, Rfl: 3     diphenhydrAMINE (BENADRYL) 50 MG/ML injection, , Disp: , Rfl:      DULoxetine (CYMBALTA) 30 MG capsule, Take 3 capsules (90 mg) by mouth daily, Disp: 60 capsule, Rfl: 3     EPINEPHrine (ANY BX GENERIC EQUIV) 0.3 MG/0.3ML injection 2-pack, , Disp: , Rfl:      folic acid (FOLVITE) 1 MG tablet, Take 1 tablet (1 mg) by mouth daily, Disp: 30 tablet, Rfl: 11     HYDROcodone-acetaminophen (NORCO) 5-325 MG tablet, Take 1 tablet by mouth every 6 hours as needed for severe pain Max of 2 tabs/day. Fill date and start date of 2020., Disp: 60 tablet, Rfl: 0     hydrOXYzine (ATARAX) 25 MG tablet, Take 1-2 tablets (25-50 mg) by mouth every 6 hours as needed for other (adjuvant pain), Disp: 150 tablet, Rfl: 0     lisinopril (ZESTRIL) 10 MG tablet, Take 1 tablet (10 mg) by mouth daily, Disp: 90 tablet, Rfl: 1     melatonin 1 MG TABS tablet, Take 1 tablet (1 mg) by mouth nightly as needed for sleep, Disp:  , Rfl:      Multiple Vitamins-Minerals (MULTIVITAMIN ADULTS) TABS, Take 1 tablet by mouth daily, Disp: , Rfl:      phenylephrine-shark liver oil-mineral oil-petrolatum (PREPARATION H) 0.25-14-74.9 % rectal ointment, Place rectally 2 times daily as needed for hemorrhoids, Disp:  , Rfl:      polyethylene glycol (MIRALAX) 17 GM/SCOOP powder, Take 17 g (1 capful) by mouth daily, Disp: 850 g, Rfl:  3     Pregabalin (LYRICA) 200 MG capsule, Take 1 capsule (200 mg) by mouth 3 times daily, Disp: 90 capsule, Rfl: 3     PRIVIGEN 40 GM/400ML SOLN, , Disp: , Rfl:      rivaroxaban ANTICOAGULANT (XARELTO ANTICOAGULANT) 20 MG TABS tablet, Take 1 tablet (20 mg) by mouth daily (with dinner), Disp: 90 tablet, Rfl: 3     SOLU-CORTEF 100 MG injection, , Disp: , Rfl:      vitamin (B COMPLEX) tablet, Take 1 tablet by mouth daily, Disp: 90 tablet, Rfl: 3     vitamin C (ASCORBIC ACID) 1000 MG TABS, Take 1 tablet (1,000 mg) by mouth daily, Disp: 30 tablet, Rfl: 0     vitamin D3 (CHOLECALCIFEROL) 2000 units (50 mcg) tablet, Take 1 tablet (2,000 Units) by mouth daily, Disp: 30 tablet, Rfl: 0    Current Facility-Administered Medications:      cyanocobalamin injection 1,000 mcg, 1,000 mcg, Intramuscular, Q30 Days, Crissy Madrigal PA-C, 1,000 mcg at 07/27/20 1128     medroxyPROGESTERone (DEPO-PROVERA) syringe 150 mg, 150 mg, Intramuscular, Q90 Days, Bertha Montano NP, 150 mg at 03/18/20 1151    Review of Systems: A complete review of systems was obtained and was negative except for what was noted above.     Physical examination:    /74 (BP Location: Right arm, Patient Position: Sitting, Cuff Size: Adult Large)   Pulse 97   SpO2 100%     General Appearance: NAD    Skin: There are no rashes or other skin lesions.    Musculoskeletal:  There is no scoliosis, lordosis, kyphosis, pes cavus, or hammertoes.    Neurologic examination:    Mental status:  Patient is alert, attentive, and oriented x 3.  Language is coherent and fluent without aphasia.  Memory, comprehension and ability to follow commands were intact.       Cranial nerves:   Pupils were round and reacted to light.  Extraocular movements were full. There was no face, jaw, palate or tongue weakness or atrophy. Hearing was grossly intact.  Shoulder shrug was normal.       Motor exam: No atrophy or fasciculations.   Manual muscle testing revealed the following MRC grade  muscle power:   Right Left   Neck flexion 5    Neck extension: 5    Shoulder abduction:  5 5   Elbow extension: 5 5   Elbow flexion:  5 5   Wrist flexion:  5 5   Wrist extension:  5 5   FDI 4+ 4+   Hip flexion 5 5   Knee flexion 5 5   Knee extension 5 5   Dorsiflexion 5 5   Plantar flexion 5 5   Green indicates improved compared to last exam  Red indicates worse compared to last exam    Complex motor skills: No tremor but there is moderate ataxia with FNF. Pseudoathetotic movements in hands.     Sensory exam: Reduced pin throughout arms and legs. Vibration reduced in the fingers, ankles and toes.      Gait: Wide based, ataxic    Deep tendon reflexes:   Right Left   Triceps 1 1   Biceps 1 1   Brachioradialis 2 2   Knee jerk 0 0   Ankle jerk 0 0   Plantar responses were flexor bilaterally.      Neuropathy Assessments  Neurology Assessments 2020 8/3/2020   RODS CIDP/MGUSP Score 16 20   Time for 'Up and Go' test- Seconds: 15.9 24.09      Strength: 2020 8/3/2020   Right hand strength in K 16   Left hand strength in K 20     Immunotherapy 2020 8/3/2020   Time since last IVIG (days): 5 days -   Current treatment: IVIG 1 gm/kg q 3 weeks none     Assessment:    Hilaria Bonner is a 29 year old woman with an acute onset, non-length dependant sensory neuropathy or ganglionopathy. 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  The evolution and pattern of the neuropathy or neuronopathy is strongly suggestive of an immune mediated mechanism. The onset of the neuropathy relative to the timing of COVID19 highly suggests that COVID19 triggered the presumed immune mediated disorder in a similar way that other antecedent infections are known to trigger Guillain-Adin Syndrome.       Plan:      1. IVIG: Continue maintenance therapy of 1g/kg q 3 weeks  2. Cramps: Maintain adequate hydration and stretching   3. Pain: Continue follow up with pain clinic. Appreciate  collateral care.   4. Gait: continue physical therapy  5. Follow up in 2 months. Sooner if needed.     Seen and discussed with Dr. Chua. His addendum follows.     Víctor Cm MD  Neuromuscular Fellow  ----  ADDENDUM:   I personally interviewed and examined the patient. I agree with the history, physical, assessment, and plan as documented above. 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 Although neuropathy due to nutritional deficiencies has also been considered, this is less likely. The presence of a TS-HDS antibody is of uncertain significance, but may support neurologic disimmunity. We discussed the prognosis of immune mediated sensory ganglionopathies, and in particular the likelihood of irreversible deficits even with immunotherapy. That said, since initiating IVIG there is some objective evidence of benefit even at this early stage. Although I-RODS is a bit worse (probably due to pain) her  strength and especially TUG time has improved. We will continue the course with IVIG + supportive care as detailed above.     Travon Chua MD

## 2020-09-09 NOTE — LETTER
October 7, 2020    Hilaria Bonner  [address]  [address]  [facility] [address]      To whom it may concern:    Hilaria Bonner is a patient under my care at the [facility]. She developed a ganglionopathy that I suspect was triggered by COVID19 infection. The neuropathy impairs her ability to use her arms and legs normally. Her balance is severely impaired. She is being treated with IVIG. Please excuse her absence from work while she undergoes evaluation and treatment for her condition. At this time I am unable to determine how long treatment will be needed.  I will be evaluating her progress on a periodic basis. Please refer to my cllinical notes for additional details of her condition.       Sincerely,        Travon Chua MD

## 2020-09-09 NOTE — PATIENT INSTRUCTIONS
1) Continue IVIG  2) Maintain adequate hydration and stretching for cramps  3) Continue follow up with pain clinic and physical therapy  4) Follow up in 2 months

## 2020-09-09 NOTE — TELEPHONE ENCOUNTER
"Pt called  and transferred to this nurse. Relayed message about the additional urine test and the results. Pt's mother was present during this conversation and said that the alcohol that was in her urine, \"was on me.\" She explained that she had given Hilaria 2 drinks one night because pt wasn't able to sleep and this was interfering with her sleep as well. States that, Hilaria doesn't usually drink any alcohol.     Let her know that was good to hear and that Janki Goodman CNP said that it is crucial that she stop drinking alcohol entirely to avoid interactions with medication management. This nurse stated that not using alcohol should not be an issue if the event described above was a one time thing. Pt and mother agreed. Relayed message about no further opioids and possible limitations with some other medications as well. Both stated understanding but are hoping that there are other medications that can be used to help her pain. Mother stated that pt has a very hard time sleeping and is concerned that, without proper rest, she will not be able to heal or improve. Let her know that I would relay this to Janki Goodman CNP and that they can talk further at the appointment scheduled next week.     Lastly, let them know that if there ever gets to be a problem with alcohol, we are happy to refer pt to resources to help with addressing the issue.     JONATHAN DialloN, RN-BC  Patient Care Supervisor  [facility]    "

## 2020-09-09 NOTE — TELEPHONE ENCOUNTER
Information added to other call that is open dated 8/25/20.  Will close this encounter.     JONATHAN DialloN, RN-BC  Patient Care Supervisor  [facility] [place]

## 2020-09-11 NOTE — TELEPHONE ENCOUNTER
Follow up scheduled 09/17/20      Kait CASSIDY, RN Care Coordinator  [facility]

## 2020-09-11 NOTE — TELEPHONE ENCOUNTER
Thank you, information noted. We will be able to discuss in more detail at our follow up appointment.     RG Puri [facility]

## 2020-09-15 NOTE — PROGRESS NOTES
"Hilaria Bonner is a 29 year old female who is being evaluated via a billable telephone visit.      The patient has been notified of following:     \"This telephone visit will be conducted via a call between you and your physician/provider. We have found that certain health care needs can be provided without the need for a physical exam.  This service lets us provide the care you need with a short phone conversation.  If a prescription is necessary we can send it directly to your pharmacy.  If lab work is needed we can place an order for that and you can then stop by our lab to have the test done at a later time.    Telephone visits are billed at different rates depending on your insurance coverage. During this emergency period, for some insurers they may be billed the same as an in-person visit.  Please reach out to your insurance provider with any questions.    If during the course of the call the physician/provider feels a telephone visit is not appropriate, you will not be charged for this service.\"    Patient has given verbal consent for Telephone visit?  Yes    Patient's mother will be present during telephone visit.    What phone number would you like to be contacted at? [phone]    How would you like to obtain your AVS? [facility]  [place]      Recommendations at last vist on 8/20/2020:  We discussed the miscommunication about participating in our pain program and I apologized for any confusion. Though I was hesitant to interfere in treatment plan with neurology it sounds as though my input in pain management would be useful. Though her pain is subacute (rather than stable chronic pain as is our usual focus), I do have some recommendations that could be useful. I was able to speak directly with Dr. Chua with neurology and discuss my pain treatment plan today. He did not raise any concerns about our management plans and did share some " information on her treatment/hopes with using IVIG.  We will plan on Hilaria working with our pain clinic for the next few months.                  1.  Pain Physical Therapy:                Continue Neuro PT and OT as planned. Though Hilaria isn't sure how helpful these resources are, I encouraged she continue on a regular basis. We may consider pain physical therapy in the future.               2.  Pain Psychologist to address relaxation, behavioral change, coping style, and other factors important to improvement.                NO  May consider later on.               3.  Medication Management:                           1. As pain continues and seems to be worsening we will try to address pain in a few different ways. First I advised she increase Lyrica from 150mg TID to 200mg TID. She understands this is the max dose. Call with issues and monitor pain impact.                          2. In two weeks (to avoid making two medication changes at the same time), she will increase Cymbalta to 90mg daily. We will increase up to 120mg as tolerated with subsequent visits.                          3. We discussed the use of opioids for pain at length today. While appropriate for acute and subacute pain, in general, I do not recommend for chronic pain management because of the development of tolerance over time, opioid induced hyperalgesia, sedation and cognitive impairment, sleep disordered breathing, and risk of unintentional overdose and death. That being said, given Hilaria's unique situation we discussed that it may be reasonable to continue for another 1-3 months but I DO NOT recommend beyond that time frame. She verbalized understanding of this. She will finish current prescription of Tramadol- as needed for severe pain. She will come in to clinic for a UDS and opioid agreement today or tomorrow- nursing will help set this up. I will plan on prescribing Norco to be taken in place of Tramadol as needed for  "severe pain.               4.  Referrals: continue evaluation and treatment with Neurology.               5.  Follow up with RG Puri CNP in 4 weeks    Additional provider notes:  -Her pain is somewhat worse than it was at last visit.  -Her mother is also present on the phone call.   -She reports her bilateral hand pain and swelling in worse than it was, \"its really intense.\" She does think the cold weather has contributed.   -She increased Lyrica as directed to 200mg TID. She has been at this dose for a couple of weeks isn't sure if this has helped or not, but denies side effects.   -As directed, she waited and increased Cymbalta to 90mg daily this past Monday. It is obviously too soon to tell if this is helping.   -She was given a prescription for Norco to be taken up to BID with the plan to use for a few months. She states she has been taking consistently but not had pain benefit. She understands she will not be given this medication moving forward.  -UDS that was completed after last visit tested positive for alcohol. Confirmatory testing did confirm this and given positive ETG, strongly suggested heavy alcohol drinking. Hilaria denies regular alcohol intake. Her mother comments that she did make her two strong drinks on the day of her UDS so she was able to get some sleep, \"that was on me.\" Her mother also comments, \"she don't drink like that\" and thinks that the positive ETG was due to the drinks that day.   -She continues neuro PT and OT. She notes her balance is improving and she is told she is making some progress but she doesn't think a lot.   -She continues to follow closely with neurology, Dr. Chua. She has had two IVIG treatments so far with increasing dose. She denies noticeable difference so far, beyond more muscle spasm and hand swelling.     Current pain medications:              Lyrica 200mg TID- SW, \"a little bit\"               Norco 5/325mg BID- NH, UDS positive for alcohol and ETG " "confirmatory, understands this or other opioids will not be prescribed              Cymbalta 90mg daily- ?, increased to this dose on Monday              Hydroxyzine 25-50mg prn- H for anxiety              Folate 1mg daily     1. Previous Pain Relevant Medications:  NOTE: This medication information taken from patient's intake form, not medical records.               Opiates: Tramadol- [facility], oxycodone- ?/[facility]              NSAIDS: doesn't take anti-inflammatories due to gastric bypass              Muscle Relaxants: tizanidine- H for sleep only              Anti-migraine mediations: no              Anti-depressants: amitriptyline (up to 75mg)- ?, \"it put me to sleep\", Cymbalta- ?              Sleep aids: no              Anxiolytics: hydroxyzine- H               Neuropathics: Lyrica- [facility], gabapentin (started on for numbness in toes and migraines after cardiac arrest)- SE, fatigue, Topamax- H for weight loss              Topicals: gabapentin cream- NH/[facility], lidocaine cream- NH, W, burned more              Other medications not covered above: Tylenol- NH     2. Physical Therapy: going to neuro PT and OT   3. Pain Psychology: no  4. Surgery: gastric bypass March 2019  5. Injections: no  6. Chiropractic: no  7. Acupuncture: no but open to this resource  8. TENS Unit: no    Assessment:  Hilaria Bonner is a 29 year old female with a past medical history significant for PE with associated PEA arrest, morbid obesity s/p sleeve gastrectomy, pancreatitis, and recent COVID 19 who presents with complaints of upper and lower extremity pain.      1. 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   2. Mental Health - the patient's mental health concerns, specifically anxiety, affect her experience of pain and contribute to her clinically significant distress.     1. Chronic inflammatory demyelinating polyneuropathy " (H)          Plan:     1.  Pain Physical Therapy:      Continue Neuro PT and OT as planned. Hilaria today reports some mild improvements, encouraged she continue on a regular basis. We may consider pain physical therapy in the future.    2.  Pain Psychologist to address relaxation, behavioral change, coping style, and other factors important to improvement.     YES, would highly recommend this resource. Though initially hesitant, Hilaria thinks this could be helpful and her mother strongly encouraged. Order placed. They will call to schedule.    3.  Medication Management:     1. Continue Lyrica 200mg three times daily- max dose.     2. Continue increased dose of Cymbalta 90mg daily-reached this dose on Monday so unclear of additional benefit. May consider additional increase up to 120mg daily.      3. Discussed addition of Topamax, may have additional neuropathic pain benefit. She is interested and will start Topamax as directed below. Monitor pain benefit. Call with issues. She understands she CANNOT drink alcohol on this medication and it is a pregnancy category X. If she is sexually active, I recommend two forms of birth control (already on Depot).   1 tab= 25mg  AM   PM  0   25mg (1 tab).  If tolerating, after 1 week go to the next line.  25mg (1 tab)  25mg (1 tab).  If tolerating, after 1 week go to the next line.  25mg (1 tab)  50mg (2 tabs).  If tolerating, after 1 week go to the next line.  50mg (2 tabs)  50mg (2 tabs). Call us when you are at this dose or with any concerns  -remain well hydrated, due to risk of kidney stones  -do not drive until you know how it affects you  -new numbness or tingling in the toes may be related to how well hydrated you are- if this occurs  - drink more fluids and it should get better    4. We may consider capsaicin cream as an adjuvant in the future.     5. We discussed the UDS that was positive for alcohol and subsequent ETG testing. This is concerning for regular heavy  alcohol intake. Both Hilaria and her mother deny this and state she does not drink on a regular basis. Regardless, opioids will not be an option for chronic pain management. They understand that we will not continue hydrocodone/tylenol or other opioids moving forward.   4.  Referrals: continue evaluation and treatment with Neurology. We may consider the addition of acupuncture in the future.   5.  Follow up with RG Puri CNP in 4 weeks.       Phone call duration: 32 minutes    RG Ibrahim CNP

## 2020-09-17 ENCOUNTER — VIRTUAL VISIT (OUTPATIENT)
Dept: PALLIATIVE MEDICINE | Facility: CLINIC | Age: 30
End: 2020-09-17
Payer: COMMERCIAL

## 2020-09-17 DIAGNOSIS — G61.81 CHRONIC INFLAMMATORY DEMYELINATING POLYNEUROPATHY (H): Primary | ICD-10-CM

## 2020-09-17 PROCEDURE — 99214 OFFICE O/P EST MOD 30 MIN: CPT | Mod: 95 | Performed by: NURSE PRACTITIONER

## 2020-09-17 RX ORDER — TOPIRAMATE 25 MG/1
50 TABLET, FILM COATED ORAL 2 TIMES DAILY
Qty: 120 TABLET | Refills: 1 | Status: SHIPPED | OUTPATIENT
Start: 2020-09-17 | End: 2020-10-15 | Stop reason: DRUGHIGH

## 2020-09-17 ASSESSMENT — PAIN SCALES - GENERAL: PAINLEVEL: WORST PAIN (10)

## 2020-09-17 NOTE — PATIENT INSTRUCTIONS
1.  Pain Physical Therapy:      Continue Neuro PT and OT as planned.   2.  Pain Psychologist to address relaxation, behavioral change, coping style, and other factors important to improvement.     YES, would highly recommend this resource. Order placed. They will call to schedule. Remember this can help support you in pain management and coping strategies.    3.  Medication Management:     1. Continue Lyrica 200mg three times daily.    2. Continue increased dose of Cymbalta 90mg daily.     3. Start Topamax as directed below. Monitor pain benefit. Call with issues. Remember you cannot drink alcohol on this medication and it is a pregnancy category X. If you are sexually active, I recommend two forms of birth control.   1 tab= 25mg  AM   PM  0   25mg (1 tab).  If tolerating, after 1 week go to the next line.  25mg (1 tab)  25mg (1 tab).  If tolerating, after 1 week go to the next line.  25mg (1 tab)  50mg (2 tabs).  If tolerating, after 1 week go to the next line.  50mg (2 tabs)  50mg (2 tabs). Call us when you are at this dose or with any concerns  -remain well hydrated, due to risk of kidney stones  -do not drive until you know how it affects you  -new numbness or tingling in the toes may be related to how well hydrated you are- if this occurs- drink more fluids and it should get better    4. We may consider capsaicin cream as an adjuvant in the future.     5. We will not continue hydrocodone/tylenol moving forward given urine drug screen.    4.  Referrals: continue evaluation and treatment with Neurology. We may consider the addition of acupuncture.    5.  Follow up with RG Puri CNP in 4 weeks.       ----------------------------------------------------------------  Clinic Number:  [phone]     Call with any questions about your care and for scheduling assistance.     Calls are returned Monday through Friday between 8 AM and 4:30 PM. We usually get back to you within 2 business days depending on the  issue/request.    If we are prescribing your medications:    For opioid medication refills, call the clinic or send a [employer] message 7 days in advance.  Please include:    Name of requested medication    Name of the pharmacy.    For non-opioid medications, call your pharmacy directly to request a refill. Please allow 3-4 days to be processed.     Per [place] State Law:    All controlled substance prescriptions must be filled within 30 days of being written.      For those controlled substances allowing refills, pickup must occur within 30 days of last fill.      We believe regular attendance is key to your success in our program!      Any time you are unable to keep your appointment we ask that you call us at least 24 hours in advance to cancel.This will allow us to offer the appointment time to another patient.     Multiple missed appointments may lead to dismissal from the clinic.

## 2020-09-21 ENCOUNTER — HOME INFUSION (PRE-WILLOW HOME INFUSION) (OUTPATIENT)
Dept: PHARMACY | Facility: CLINIC | Age: 30
End: 2020-09-21

## 2020-09-22 ENCOUNTER — HOME INFUSION (PRE-WILLOW HOME INFUSION) (OUTPATIENT)
Dept: PHARMACY | Facility: CLINIC | Age: 30
End: 2020-09-22

## 2020-09-22 ENCOUNTER — TELEPHONE (OUTPATIENT)
Dept: FAMILY MEDICINE | Facility: CLINIC | Age: 30
End: 2020-09-22

## 2020-09-22 DIAGNOSIS — G62.9 POLYNEUROPATHY: ICD-10-CM

## 2020-09-22 DIAGNOSIS — I26.99 PULMONARY EMBOLISM WITH INFARCTION (H): ICD-10-CM

## 2020-09-22 RX ORDER — FUROSEMIDE 40 MG
TABLET ORAL
Qty: 30 TABLET | OUTPATIENT
Start: 2020-09-22

## 2020-09-22 NOTE — PROGRESS NOTES
This is a recent snapshot of the patient's [facility] medical record.  For current drug dose and complete information and questions, call [phone] or In Basket pool, fv home infusion ([id number])  CSN Number:  [id number]

## 2020-09-23 ENCOUNTER — HOME INFUSION (PRE-WILLOW HOME INFUSION) (OUTPATIENT)
Dept: PHARMACY | Facility: CLINIC | Age: 30
End: 2020-09-23

## 2020-09-23 RX ORDER — AMITRIPTYLINE HYDROCHLORIDE 50 MG/1
TABLET ORAL
Qty: 45 TABLET | Refills: 3 | OUTPATIENT
Start: 2020-09-23

## 2020-09-23 NOTE — PROGRESS NOTES
This is a recent snapshot of the patient's [facility] medical record.  For current drug dose and complete information and questions, call [phone] or In Basket pool, fv home infusion ([id number])  CSN Number:  [id number]

## 2020-09-24 NOTE — TELEPHONE ENCOUNTER
This writer attempted to contact pt on 09/24/20      Reason for call schedule non-fasting labs and left message.      If patient calls back:   Schedule Lab appointment within 1 week with lab, document that pt called and close encounter         INO Her also sent to pt.    Carolann ESTRELLA, Patient Care

## 2020-09-24 NOTE — TELEPHONE ENCOUNTER
Medication refilled for one month. Please call and inform patient that nonfasting labs are needed at her earliest convenience- could get these curbside

## 2020-09-24 NOTE — TELEPHONE ENCOUNTER
Routing refill request to provider for review/approval because:    Direct Oral Anticoagulant Agents [id number]/22/2020 02:27 PM   Normal Platelets on file in past 12 months Protocol Details    Creatinine Clearance greater than 50 ml/min on file in past 3 mos      Kait Manriquez RN, [facility] Triage

## 2020-09-25 ENCOUNTER — PATIENT OUTREACH (OUTPATIENT)
Dept: CARE COORDINATION | Facility: CLINIC | Age: 30
End: 2020-09-25

## 2020-09-25 NOTE — PROGRESS NOTES
Clinic Care Coordination Contact  Follow Up Progress Note      Assessment: Outreach to patient. Left message on voicemail and requested return call.     Patient continues to receive IV previgen for inflammatory demyelination polyneuropathy due to covid-19.     Patient is also being seen in outpatient PT and OT at [facility]    Goals addressed this encounter:   Goals Addressed                 This Visit's Progress      Improve chronic symptoms (pt-stated)   10%     Goal Statement: I want the burning pain in my hands to improve    Date Goal set: 6/12/20    Barriers: Polyneuropathy    Strengths: Family support    Date to Achieve By: December 2020    Patient expressed understanding of goal: yes    Action steps to achieve this goal:  1. I will continue Lyrica TID  2. I will use Tramadol as instructed for pain  3. I will participate in outpatient therapies         Intervention/Education provided during outreach: Requested return call     Outreach Frequency: monthly    Plan:   Patient will continue current plan of care.    Care Coordinator will follow up in one month.    Monica Hernandez RN, [facility] RN Coordinator  [facility]   9/25/2020    12:46 PM  [phone]

## 2020-09-29 NOTE — PROGRESS NOTES
This is a recent snapshot of the patient's [facility] medical record.  For current drug dose and complete information and questions, call [phone] or In Basket pool, fv home infusion ([id number])  CSN Number:  [id number]

## 2020-09-30 ENCOUNTER — OFFICE VISIT (OUTPATIENT)
Dept: FAMILY MEDICINE | Facility: CLINIC | Age: 30
End: 2020-09-30
Payer: COMMERCIAL

## 2020-09-30 VITALS
BODY MASS INDEX: 37.04 KG/M2 | SYSTOLIC BLOOD PRESSURE: 122 MMHG | HEART RATE: 107 BPM | TEMPERATURE: 97.6 F | WEIGHT: 236 LBS | OXYGEN SATURATION: 98 % | DIASTOLIC BLOOD PRESSURE: 84 MMHG | HEIGHT: 67 IN

## 2020-09-30 DIAGNOSIS — Z23 NEED FOR INFLUENZA VACCINATION: ICD-10-CM

## 2020-09-30 DIAGNOSIS — N89.8 VAGINAL DISCHARGE: ICD-10-CM

## 2020-09-30 DIAGNOSIS — N76.0 BACTERIAL VAGINOSIS: ICD-10-CM

## 2020-09-30 DIAGNOSIS — B96.89 BACTERIAL VAGINOSIS: ICD-10-CM

## 2020-09-30 DIAGNOSIS — Z30.42 ENCOUNTER FOR SURVEILLANCE OF INJECTABLE CONTRACEPTIVE: ICD-10-CM

## 2020-09-30 DIAGNOSIS — Z00.00 ROUTINE GENERAL MEDICAL EXAMINATION AT A HEALTH CARE FACILITY: Primary | ICD-10-CM

## 2020-09-30 DIAGNOSIS — E53.8 VITAMIN B12 DEFICIENCY (NON ANEMIC): ICD-10-CM

## 2020-09-30 DIAGNOSIS — I10 BENIGN ESSENTIAL HYPERTENSION: ICD-10-CM

## 2020-09-30 DIAGNOSIS — Z13.220 SCREENING FOR HYPERLIPIDEMIA: ICD-10-CM

## 2020-09-30 DIAGNOSIS — N93.9 VAGINAL BLEEDING: ICD-10-CM

## 2020-09-30 DIAGNOSIS — I26.99 PULMONARY EMBOLISM WITH INFARCTION (H): ICD-10-CM

## 2020-09-30 LAB
ALBUMIN SERPL-MCNC: 2.7 G/DL (ref 3.4–5)
ALP SERPL-CCNC: 108 U/L (ref 40–150)
ALT SERPL W P-5'-P-CCNC: 22 U/L (ref 0–50)
ANION GAP SERPL CALCULATED.3IONS-SCNC: 9 MMOL/L (ref 3–14)
AST SERPL W P-5'-P-CCNC: 21 U/L (ref 0–45)
B-HCG SERPL-ACNC: <1 IU/L (ref 0–5)
BASOPHILS # BLD AUTO: 0 10E9/L (ref 0–0.2)
BASOPHILS NFR BLD AUTO: 0.2 %
BILIRUB SERPL-MCNC: 0.3 MG/DL (ref 0.2–1.3)
BUN SERPL-MCNC: 8 MG/DL (ref 7–30)
CALCIUM SERPL-MCNC: 8.3 MG/DL (ref 8.5–10.1)
CHLORIDE SERPL-SCNC: 105 MMOL/L (ref 94–109)
CHOLEST SERPL-MCNC: 221 MG/DL
CO2 SERPL-SCNC: 24 MMOL/L (ref 20–32)
CREAT SERPL-MCNC: 0.6 MG/DL (ref 0.52–1.04)
DIFFERENTIAL METHOD BLD: ABNORMAL
EOSINOPHIL # BLD AUTO: 0 10E9/L (ref 0–0.7)
EOSINOPHIL NFR BLD AUTO: 0.4 %
ERYTHROCYTE [DISTWIDTH] IN BLOOD BY AUTOMATED COUNT: 18.4 % (ref 10–15)
GFR SERPL CREATININE-BSD FRML MDRD: >90 ML/MIN/{1.73_M2}
GLUCOSE SERPL-MCNC: 149 MG/DL (ref 70–99)
HCT VFR BLD AUTO: 34.4 % (ref 35–47)
HDLC SERPL-MCNC: 72 MG/DL
HGB BLD-MCNC: 11.2 G/DL (ref 11.7–15.7)
LDLC SERPL CALC-MCNC: ABNORMAL MG/DL
LDLC SERPL DIRECT ASSAY-MCNC: 88 MG/DL
LYMPHOCYTES # BLD AUTO: 2.5 10E9/L (ref 0.8–5.3)
LYMPHOCYTES NFR BLD AUTO: 49.1 %
MCH RBC QN AUTO: 31.8 PG (ref 26.5–33)
MCHC RBC AUTO-ENTMCNC: 32.6 G/DL (ref 31.5–36.5)
MCV RBC AUTO: 98 FL (ref 78–100)
MONOCYTES # BLD AUTO: 0.4 10E9/L (ref 0–1.3)
MONOCYTES NFR BLD AUTO: 7.8 %
NEUTROPHILS # BLD AUTO: 2.1 10E9/L (ref 1.6–8.3)
NEUTROPHILS NFR BLD AUTO: 42.5 %
NONHDLC SERPL-MCNC: 149 MG/DL
PLATELET # BLD AUTO: 350 10E9/L (ref 150–450)
POTASSIUM SERPL-SCNC: 3.5 MMOL/L (ref 3.4–5.3)
PROT SERPL-MCNC: 7.6 G/DL (ref 6.8–8.8)
RBC # BLD AUTO: 3.52 10E12/L (ref 3.8–5.2)
SODIUM SERPL-SCNC: 138 MMOL/L (ref 133–144)
SPECIMEN SOURCE: ABNORMAL
TRIGL SERPL-MCNC: 1050 MG/DL
WBC # BLD AUTO: 5 10E9/L (ref 4–11)
WET PREP SPEC: ABNORMAL

## 2020-09-30 PROCEDURE — 90686 IIV4 VACC NO PRSV 0.5 ML IM: CPT | Performed by: PHYSICIAN ASSISTANT

## 2020-09-30 PROCEDURE — 36415 COLL VENOUS BLD VENIPUNCTURE: CPT | Performed by: PHYSICIAN ASSISTANT

## 2020-09-30 PROCEDURE — 90471 IMMUNIZATION ADMIN: CPT | Performed by: PHYSICIAN ASSISTANT

## 2020-09-30 PROCEDURE — 83721 ASSAY OF BLOOD LIPOPROTEIN: CPT | Mod: 59 | Performed by: PHYSICIAN ASSISTANT

## 2020-09-30 PROCEDURE — 80061 LIPID PANEL: CPT | Performed by: PHYSICIAN ASSISTANT

## 2020-09-30 PROCEDURE — 82043 UR ALBUMIN QUANTITATIVE: CPT | Performed by: PHYSICIAN ASSISTANT

## 2020-09-30 PROCEDURE — 99214 OFFICE O/P EST MOD 30 MIN: CPT | Mod: 25 | Performed by: PHYSICIAN ASSISTANT

## 2020-09-30 PROCEDURE — 84702 CHORIONIC GONADOTROPIN TEST: CPT | Performed by: PHYSICIAN ASSISTANT

## 2020-09-30 PROCEDURE — 87210 SMEAR WET MOUNT SALINE/INK: CPT | Performed by: PHYSICIAN ASSISTANT

## 2020-09-30 PROCEDURE — 85025 COMPLETE CBC W/AUTO DIFF WBC: CPT | Performed by: PHYSICIAN ASSISTANT

## 2020-09-30 PROCEDURE — 80053 COMPREHEN METABOLIC PANEL: CPT | Performed by: PHYSICIAN ASSISTANT

## 2020-09-30 PROCEDURE — 99395 PREV VISIT EST AGE 18-39: CPT | Mod: 25 | Performed by: PHYSICIAN ASSISTANT

## 2020-09-30 PROCEDURE — 96372 THER/PROPH/DIAG INJ SC/IM: CPT | Performed by: PHYSICIAN ASSISTANT

## 2020-09-30 RX ORDER — METRONIDAZOLE 500 MG/1
500 TABLET ORAL 2 TIMES DAILY
Qty: 14 TABLET | Refills: 0 | Status: SHIPPED | OUTPATIENT
Start: 2020-09-30 | End: 2020-10-07

## 2020-09-30 RX ORDER — MEDROXYPROGESTERONE ACETATE 150 MG/ML
150 INJECTION, SUSPENSION INTRAMUSCULAR
Status: COMPLETED | OUTPATIENT
Start: 2020-09-30 | End: 2021-06-17

## 2020-09-30 RX ORDER — CYANOCOBALAMIN 1000 UG/ML
1000 INJECTION, SOLUTION INTRAMUSCULAR; SUBCUTANEOUS
Status: DISCONTINUED | OUTPATIENT
Start: 2020-09-30 | End: 2021-12-20

## 2020-09-30 RX ADMIN — CYANOCOBALAMIN 1000 MCG: 1000 INJECTION, SOLUTION INTRAMUSCULAR; SUBCUTANEOUS at 17:25

## 2020-09-30 RX ADMIN — MEDROXYPROGESTERONE ACETATE 150 MG: 150 INJECTION, SUSPENSION INTRAMUSCULAR at 17:26

## 2020-09-30 ASSESSMENT — MIFFLIN-ST. JEOR: SCORE: 1832.08

## 2020-09-30 ASSESSMENT — PAIN SCALES - GENERAL: PAINLEVEL: WORST PAIN (10)

## 2020-09-30 NOTE — PROGRESS NOTES
fag   SUBJECTIVE:   CC: Hilaria Bonner is an 29 year old woman who presents for preventive health visit.       Patient has been advised of split billing requirements and indicates understanding: Yes  Healthy Habits:    Do you get at least three servings of calcium containing foods daily (dairy, green leafy vegetables, etc.)? yes    Amount of exercise or daily activities, outside of work: 7 day(s) per week    Problems taking medications regularly No    Medication side effects: No    Have you had an eye exam in the past two years? yes    Do you see a dentist twice per year? yes    Do you have sleep apnea, excessive snoring or daytime drowsiness?no          Today's PHQ-2 Score:   PHQ-2 (  [employer]) 2020   Q1: Little interest or pleasure in doing things 2 0   Q2: Feeling down, depressed or hopeless 3 0   PHQ-2 Score 5 0       Abuse: Current or Past(Physical, Sexual or Emotional)- NO  Do you feel safe in your environment? YES        Social History     Tobacco Use     Smoking status: Former Smoker     Packs/day: 0.30     Years: 1.00     Pack years: 0.30     Types: Cigarettes     Start date: 2016     Last attempt to quit: 2018     Years since quittin.7     Smokeless tobacco: Never Used   Substance Use Topics     Alcohol use: Not Currently     Alcohol/week: 0.0 standard drinks     Comment: occasional     If you drink alcohol do you typically have >3 drinks per day or >7 drinks per week? No                     Reviewed orders with patient.  Reviewed health maintenance and updated orders accordingly - Yes  BP Readings from Last 3 Encounters:   20 122/84   20 125/74   20 114/77    Wt Readings from Last 3 Encounters:   20 107 kg (236 lb)   20 112 kg (247 lb)   20 113.7 kg (250 lb 9.6 oz)                  Patient Active Problem List   Diagnosis     Morbid obesity (H)     Vitamin D deficiency     Elevated parathyroid hormone     Encounter for smoking cessation  counseling     Menorrhagia with irregular cycle     Morbid (severe) obesity due to excess calories (H)     Pulmonary embolism with infarction (H)     Cardiac arrest, cause unspecified (H)     VT (ventricular tachycardia) (H)     Other pulmonary embolism with acute cor pulmonale, unspecified chronicity (H)     Secondary hyperparathyroidism, not elsewhere classified (H)     Paresthesias     Hemorrhoids, unspecified hemorrhoid type     Anxiety     Polyneuropathy     Altered mental status     CIDP (chronic inflammatory demyelinating polyneuropathy) (H)     Past Surgical History:   Procedure Laterality Date     GYN SURGERY      2 C-sections     KNEE SURGERY  most recently - [id number]    3 surgeries in [place]     LAPAROSCOPIC GASTRIC SLEEVE N/A 3/26/2019    Procedure: Laparoscopic Sleeve Gastrectomy;  Surgeon: Luan Lopez MD;  Location: [facility] OR     ORTHOPEDIC SURGERY      2 knee meniscus surgery       Social History     Tobacco Use     Smoking status: Former Smoker     Packs/day: 0.30     Years: 1.00     Pack years: 0.30     Types: Cigarettes     Start date: 2016     Last attempt to quit: 2018     Years since quittin.7     Smokeless tobacco: Never Used   Substance Use Topics     Alcohol use: Not Currently     Alcohol/week: 0.0 standard drinks     Comment: occasional     Family History   Problem Relation Age of Onset     Diabetes Father      Hypertension Father      Breast Cancer Sister 26        surgery only     Cancer Sister      Coronary Artery Disease Other         paternal aunt     Thyroid Disease Other         neice     Coronary Artery Disease Other      Cerebrovascular Disease Other      Breast Cancer Sister      Thyroid Disease Other      Cerebrovascular Disease No family hx of          Current Outpatient Medications   Medication Sig Dispense Refill     cholecalciferol 50 MCG (2000) tablet Take 1 tablet by mouth daily 90 tablet 3     diphenhydrAMINE (BENADRYL) 50 MG/ML injection         DULoxetine (CYMBALTA) 30 MG capsule Take 3 capsules (90 mg) by mouth daily 60 capsule 3     EPINEPHrine (ANY BX GENERIC EQUIV) 0.3 MG/0.3ML injection 2-pack        folic acid (FOLVITE) 1 MG tablet Take 1 tablet (1 mg) by mouth daily 30 tablet 11     hydrOXYzine (ATARAX) 25 MG tablet Take 1-2 tablets (25-50 mg) by mouth every 6 hours as needed for other (adjuvant pain) 150 tablet 0     lisinopril (ZESTRIL) 10 MG tablet Take 1 tablet (10 mg) by mouth daily 90 tablet 1     melatonin 1 MG TABS tablet Take 1 tablet (1 mg) by mouth nightly as needed for sleep       metroNIDAZOLE (FLAGYL) 500 MG tablet Take 1 tablet (500 mg) by mouth 2 times daily for 7 days 14 tablet 0     Multiple Vitamins-Minerals (MULTIVITAMIN ADULTS) TABS Take 1 tablet by mouth daily       phenylephrine-shark liver oil-mineral oil-petrolatum (PREPARATION H) 0.25-14-74.9 % rectal ointment Place rectally 2 times daily as needed for hemorrhoids       polyethylene glycol (MIRALAX) 17 GM/SCOOP powder Take 17 g (1 capful) by mouth daily 850 g 3     Pregabalin (LYRICA) 200 MG capsule Take 1 capsule (200 mg) by mouth 3 times daily 90 capsule 3     PRIVIGEN 40 GM/400ML SOLN        rivaroxaban ANTICOAGULANT (XARELTO ANTICOAGULANT) 20 MG TABS tablet Take 1 tablet (20 mg) by mouth daily (with dinner) 30 tablet 0     SOLU-CORTEF 100 MG injection        topiramate (TOPAMAX) 25 MG tablet Take 2 tablets (50 mg) by mouth 2 times daily 120 tablet 1     vitamin (B COMPLEX) tablet Take 1 tablet by mouth daily 90 tablet 3     vitamin C (ASCORBIC ACID) 1000 MG TABS Take 1 tablet (1,000 mg) by mouth daily 30 tablet 0     vitamin D3 (CHOLECALCIFEROL) 2000 units (50 mcg) tablet Take 1 tablet (2,000 Units) by mouth daily 30 tablet 0       Mammogram not appropriate for this patient based on age.    Pertinent mammograms are reviewed under the imaging tab.  History of abnormal Pap smear: NO - age 21-29 PAP every 3 years recommended  PAP / HPV 9/27/2019 12/6/2016   PAP NIL NIL  "    Reviewed and updated as needed this visit by clinical staff  Tobacco  Allergies  Meds  Med Hx  Surg Hx  Fam Hx  Soc Hx        Reviewed and updated as needed this visit by Provider  Tobacco  Allergies  Meds  Problems  Med Hx  Surg Hx  Fam Hx  Soc Hx         Patient well known to me with complicated medical history including massive pulmonary embolism with cardiac arrest, history of covid with resulting neuropathy for which she is followed by pain clinic and neurology - has recently been getting Iv treatments without help.   Has had  Vaginal Bleeding for approximately one month- did not have cycle since depo one year  Bleeding through extra thick pad every hour. Also notes vaginal odor   Pretty sure due for depo - last one in June   Not having sex  Is Passing some blood clots- no lightheaded or dizzy   Down 9 pounds since July    ROS:  CONSTITUTIONAL:has had some intentional weight loss   INTEGUMENTARU/SKIN: NEGATIVE for worrisome rashes, moles or lesions  EYES: NEGATIVE for vision changes or irritation  ENT: NEGATIVE for ear, mouth and throat problems  RESP: NEGATIVE for significant cough or SOB  BREAST: NEGATIVE for masses, tenderness or discharge  CV: NEGATIVE for chest pain, palpitations or peripheral edema  GI: NEGATIVE for nausea, abdominal pain, heartburn, or change in bowel habits   female: see hpi   MUSCULOSKELETAL:significant myalgias and arthralgias followed by neuro and pain clinic   NEURO: significant myalgias and arthralgias-followed by neuro and pain clinic  PSYCHIATRIC: NEGATIVE for changes in mood or affect    OBJECTIVE:   /84   Pulse 107   Temp 97.6  F (36.4  C) (Oral)   Ht 1.708 m (5' 7.25\")   Wt 107 kg (236 lb)   SpO2 98%   BMI 36.69 kg/m    EXAM:  GENERAL: alert, no distress and obese  EYES: Eyes grossly normal to inspection, PERRL and conjunctivae and sclerae normal  HENT: ear canals and TM's normal, nose and mouth without ulcers or lesions  NECK: no adenopathy, no " asymmetry, masses, or scars and thyroid normal to palpation  RESP: lungs clear to auscultation - no rales, rhonchi or wheezes  BREAST: normal without masses, tenderness or nipple discharge and no palpable axillary masses or adenopathy  CV: regular rate and rhythm, normal S1 S2, no S3 or S4, no murmur, click or rub, no peripheral edema and peripheral pulses strong  ABDOMEN: soft, nontender, no hepatosplenomegaly, no masses and bowel sounds normal   (female): normal female external genitalia, normal urethral meatus , vaginal mucosa pink, moist, well rugated, vaginal discharge - moderate and bloody and normal cervix, adnexae, and uterus without masses.  SKIN: no suspicious lesions or rashes  NEURO: Normal strength and tone, mentation intact and speech normal- myalgias and neuralgias per neuro     Diagnostic Test Results:  Results for orders placed or performed in visit on 09/30/20   CBC with platelets and differential     Status: Abnormal   Result Value Ref Range    WBC 5.0 4.0 - 11.0 10e9/L    RBC Count 3.52 (L) 3.8 - 5.2 10e12/L    Hemoglobin 11.2 (L) 11.7 - 15.7 g/dL    Hematocrit 34.4 (L) 35.0 - 47.0 %    MCV 98 78 - 100 fl    MCH 31.8 26.5 - 33.0 pg    MCHC 32.6 31.5 - 36.5 g/dL    RDW 18.4 (H) 10.0 - 15.0 %    Platelet Count 350 150 - 450 10e9/L    % Neutrophils 42.5 %    % Lymphocytes 49.1 %    % Monocytes 7.8 %    % Eosinophils 0.4 %    % Basophils 0.2 %    Absolute Neutrophil 2.1 1.6 - 8.3 10e9/L    Absolute Lymphocytes 2.5 0.8 - 5.3 10e9/L    Absolute Monocytes 0.4 0.0 - 1.3 10e9/L    Absolute Eosinophils 0.0 0.0 - 0.7 10e9/L    Absolute Basophils 0.0 0.0 - 0.2 10e9/L    Diff Method Automated Method    Comprehensive metabolic panel (BMP + Alb, Alk Phos, ALT, AST, Total. Bili, TP)     Status: None (In process)   Result Value Ref Range    Sodium 138 133 - 144 mmol/L    Potassium 3.5 3.4 - 5.3 mmol/L    Chloride 105 94 - 109 mmol/L    Carbon Dioxide PENDING 20 - 32 mmol/L    Anion Gap PENDING 3 - 14 mmol/L     Glucose PENDING 70 - 99 mg/dL    Urea Nitrogen PENDING 7 - 30 mg/dL    Creatinine PENDING 0.52 - 1.04 mg/dL    GFR Estimate PENDING >60 mL/min/[1.73_m2]    GFR Estimate If Black PENDING >60 mL/min/[1.73_m2]    Calcium PENDING 8.5 - 10.1 mg/dL    Bilirubin Total PENDING 0.2 - 1.3 mg/dL    Albumin PENDING 3.4 - 5.0 g/dL    Protein Total PENDING 6.8 - 8.8 g/dL    Alkaline Phosphatase PENDING 40 - 150 U/L    ALT PENDING 0 - 50 U/L    AST PENDING 0 - 45 U/L   Wet prep     Status: Abnormal    Specimen: Vagina   Result Value Ref Range    Specimen Description Vagina     Wet Prep Clue cells seen  Moderate   (A)     Wet Prep WBC'S seen  Few       Wet Prep No Trichomonas seen     Wet Prep No yeast seen        ASSESSMENT/PLAN:   1. Routine general medical examination at a health care facility      2. Vaginal bleeding  Rule out pregnancy.  - previous anemia with hgb of 9.1 three months ago improved to 11.2   Obtain pelvic ultrasound and follow up with gynecology   - CBC with platelets and differential  - HCG Quantitative Pregnancy, Blood ([id number])  - US Pelvic Complete with Transvaginal; Future  - OB/GYN REFERRAL    3. Benign essential hypertension  Rule out microalbuminuria   Blood pressure at goal with current dose lisinopril 10 mg daily- denies need for refill at at this time   - Albumin Random Urine Quantitative with Creat Ratio    4. Bacterial vaginosis  Clue cells on wet prep - will treat with flagyl   - metroNIDAZOLE (FLAGYL) 500 MG tablet; Take 1 tablet (500 mg) by mouth 2 times daily for 7 days  Dispense: 14 tablet; Refill: 0    5. Vaginal discharge    - Wet prep    6. Screening for hyperlipidemia    - Lipid panel reflex to direct LDL Non-fasting    7. Pulmonary embolism with infarction (H)  Recheck comprehensive metabolic panel - history of acute kidney injury in past   - Comprehensive metabolic panel (BMP + Alb, Alk Phos, ALT, AST, Total. Bili, TP)    8. Encounter for surveillance of injectable contraceptive    -  "medroxyPROGESTERone (DEPO-PROVERA) syringe 150 mg  - THER/PROPH/DIAG INJ, SC/IM    9. Vitamin B12 deficiency (non anemic)     - cyanocobalamin injection 1,000 mcg  - THER/PROPH/DIAG INJ, SC/IM    10. Need for influenza vaccination    - INFLUENZA VACCINE IM > 6 MONTHS VALENT IIV4 [67293]  - ADMIN 1st VACCINE    Patient has been advised of split billing requirements and indicates understanding: not addressed by myself today   COUNSELING:   Reviewed preventive health counseling, as reflected in patient instructions       Regular exercise       Healthy diet/nutrition    Estimated body mass index is 36.69 kg/m  as calculated from the following:    Height as of this encounter: 1.708 m (5' 7.25\").    Weight as of this encounter: 107 kg (236 lb).    Weight management plan: Discussed healthy diet and exercise guidelines    She reports that she quit smoking about 2 years ago. Her smoking use included cigarettes. She started smoking about 3 years ago. She has a 0.30 pack-year smoking history. She has never used smokeless tobacco.      Counseling Resources:  ATP IV Guidelines  Pooled Cohorts Equation Calculator  Breast Cancer Risk Calculator  BRCA-Related Cancer Risk Assessment: FHS-7 Tool  FRAX Risk Assessment  ICSI Preventive Guidelines  Dietary Guidelines for Americans, 2010  USDA's MyPlate  ASA Prophylaxis  Lung CA Screening    Crissy Madrigal PA-C  [facility]  "

## 2020-09-30 NOTE — NURSING NOTE
Clinic Administered Medication Documentation      Depo Provera Documentation    URINE HCG: negative    Depo-Provera Standing Order inclusion/exclusion criteria reviewed.   Patient meets: inclusion criteria     BP: 122/84  LAST PAP/EXAM:   Lab Results   Component Value Date    PAP NIL 09/27/2019       Prior to injection, verified patient identity using patient's name and date of birth. Medication was administered. Please see MAR and medication order for additional information.     Was entire vial of medication used? Yes  Vial/Syringe: Single dose vial  Expiration Date:  2/2022    Patient instructed to report any adverse reaction to staff immediately .  NEXT INJECTION DUE: 12/16/20 - 12/30/20    Injectable Medication Documentation    Patient was given Cyanocobalamin (B-12). Prior to medication administration, verified patients identity using patient s name and date of birth. Please see MAR and medication order for additional information. Patient instructed to report any adverse reaction to staff immediately .      Was entire vial of medication used? Yes  Vial/Syringe: Single dose vial  Expiration Date:  9/20  Was this medication supplied by the patient? No     Nic Nash CMA

## 2020-09-30 NOTE — RESULT ENCOUNTER NOTE
Aleksey Manrique  Your wet prep shows bacterial vaginosis.  I have sent a prescription for flagyl to take twice a day for the next 7 days. Do not drink any alcohol with this or 3 days after completing the medication.   Please call or MyChart my office with any questions or concerns.    Crissy Madrigal, PAC

## 2020-09-30 NOTE — PATIENT INSTRUCTIONS
Schedule pelvic ultrasound at [facility] ([phone]) formerly called [facility] as soon as possible   Follow up with gynecology at [facility] ([phone]) formerly called [facility] or here at [facility] as soon as possible - a day or two after ultrasound    Patient Education        Preventive Health Recommendations  Female Ages 26 - 39  Yearly exam:   See your health care provider every year in order to    Review health changes.     Discuss preventive care.      Review your medicines if you your doctor has prescribed any.    Until age 30: Get a Pap test every three years (more often if you have had an abnormal result).    After age 30: Talk to your doctor about whether you should have a Pap test every 3 years or have a Pap test with HPV screening every 5 years.   You do not need a Pap test if your uterus was removed (hysterectomy) and you have not had cancer.  You should be tested each year for STDs (sexually transmitted diseases), if you're at risk.   Talk to your provider about how often to have your cholesterol checked.  If you are at risk for diabetes, you should have a diabetes test (fasting glucose).  Shots: Get a flu shot each year. Get a tetanus shot every 10 years.   Nutrition:     Eat at least 5 servings of fruits and vegetables each day.    Eat whole-grain bread, whole-wheat pasta and brown rice instead of white grains and rice.    Get adequate Calcium and Vitamin D.     Lifestyle    Exercise at least 150 minutes a week (30 minutes a day, 5 days of the week). This will help you control your weight and prevent disease.    Limit alcohol to one drink per day.    No smoking.     Wear sunscreen to prevent skin cancer.    See your dentist every six months for an exam and cleaning.

## 2020-10-01 ENCOUNTER — TELEPHONE (OUTPATIENT)
Dept: FAMILY MEDICINE | Facility: CLINIC | Age: 30
End: 2020-10-01

## 2020-10-01 ENCOUNTER — HOSPITAL ENCOUNTER (OUTPATIENT)
Dept: PHYSICAL THERAPY | Facility: CLINIC | Age: 30
Setting detail: THERAPIES SERIES
End: 2020-10-01
Attending: PHYSICIAN ASSISTANT
Payer: COMMERCIAL

## 2020-10-01 ENCOUNTER — MYC MEDICAL ADVICE (OUTPATIENT)
Dept: FAMILY MEDICINE | Facility: CLINIC | Age: 30
End: 2020-10-01

## 2020-10-01 ENCOUNTER — APPOINTMENT (OUTPATIENT)
Dept: LAB | Facility: CLINIC | Age: 30
End: 2020-10-01
Attending: PSYCHIATRY & NEUROLOGY
Payer: COMMERCIAL

## 2020-10-01 ENCOUNTER — HOSPITAL ENCOUNTER (OUTPATIENT)
Dept: OCCUPATIONAL THERAPY | Facility: CLINIC | Age: 30
Setting detail: THERAPIES SERIES
End: 2020-10-01
Attending: PHYSICIAN ASSISTANT
Payer: COMMERCIAL

## 2020-10-01 DIAGNOSIS — R73.9 ELEVATED RANDOM BLOOD GLUCOSE LEVEL: ICD-10-CM

## 2020-10-01 DIAGNOSIS — E83.51 HYPOCALCEMIA: ICD-10-CM

## 2020-10-01 DIAGNOSIS — R80.9 MICROALBUMINURIA: Primary | ICD-10-CM

## 2020-10-01 DIAGNOSIS — E78.1 HYPERTRIGLYCERIDEMIA: Primary | ICD-10-CM

## 2020-10-01 LAB
CREAT UR-MCNC: 154 MG/DL
MICROALBUMIN UR-MCNC: 251 MG/L
MICROALBUMIN/CREAT UR: 162.99 MG/G CR (ref 0–25)

## 2020-10-01 PROCEDURE — 97530 THERAPEUTIC ACTIVITIES: CPT | Mod: GO | Performed by: OCCUPATIONAL THERAPIST

## 2020-10-01 PROCEDURE — 97116 GAIT TRAINING THERAPY: CPT | Mod: GP | Performed by: PHYSICAL THERAPIST

## 2020-10-01 PROCEDURE — 97110 THERAPEUTIC EXERCISES: CPT | Mod: GP | Performed by: PHYSICAL THERAPIST

## 2020-10-01 NOTE — TELEPHONE ENCOUNTER
Reviewed results with patient and she states that she has a lab apt set up tomorrow. Reviewed with her that it is  important to fast. She verbalized understanding.     She has a few concerns/questions:    She is concerned that her calcium is low because she takes calcium 500 mg with vitamin D chewable tablets 3 x daily .     Patient wanting to know if she is supposed to continue taking the lisinopril 10 mg tablet.    Patient Would like the response sent in a My Chart message instead of calling her.     Sophia Zamora RN          Please call and advise - also sent [employer] message- see also previous message- has bacterial vaginosis and treated with flagyl- no alcohol      Hello Hilaria  Your LDL cholesterol (bad cholesterol) was normal.   Your triglycerides are off the charts- should be less than 150.   You run the risk of pancreatitis as well as heart attack.  Schedule a fasting lab only appointment (nothing to eat or drink for 9 hours except water and/or medication) in the next couple days and lets recheck those triglycerides.  i've never seen a number that high before.  Your blood sugar was slightly elevated but not concerning since it was not a fasting specimen- but we will recheck when fasting .   Your calcium level was a little low so we will recheck that when you come in for labs  Your hemoglobin is closer to normal.  Please call or MyChart my office with any questions or concerns.   Crissy Madrigal, PAC

## 2020-10-01 NOTE — RESULT ENCOUNTER NOTE
Please call and advise - also sent [employer] message- see also previous message- has bacterial vaginosis and treated with flagyl- no alcohol      Hello Hilaria  Your LDL cholesterol (bad cholesterol) was normal.   Your triglycerides are off the charts- should be less than 150.   You run the risk of pancreatitis as well as heart attack.  Schedule a fasting lab only appointment (nothing to eat or drink for 9 hours except water and/or medication) in the next couple days and lets recheck those triglycerides.  i've never seen a number that high before.  Your blood sugar was slightly elevated but not concerning since it was not a fasting specimen- but we will recheck when fasting .   Your calcium level was a little low so we will recheck that when you come in for labs  Your hemoglobin is closer to normal.  Please call or MyChart my office with any questions or concerns.   Crissy Madrigal, PAC

## 2020-10-01 NOTE — PROGRESS NOTES
Outpatient Occupational Therapy Progress Note     Patient: Hilaria Bonner  : 1990    Beginning/End Dates of Reporting Period:  2020 to 10/1/2020    Referring Provider: Crissy Madrigal PA-C    Therapy Diagnosis: Decreased I/ADLs.     Client Self Report: Pt has not been to therapy since 9/3/2020 due to a fall and an out of town trip.     Objective Measurements:     Objective Measure: /pinch strength       Objective Measure: 9 hole peg   Details: Initiated task with right hand but pt had fake nails on and it made it very difficult to grasp pegs.                                     Goals:     Goal Identifier 1   Goal Description Pt will reduce 9-hole peg test score with bilateral hands by at least 10 seconds to improve FMC for I/ADLs.   Target Date 20   Date Met      Progress:     Goal Identifier 2   Goal Description Pt will be able to tolerate light brushing of grey foam to both surfaces of hands and feet for one minute due to desensitization to allow for less pain during ADLs.     Target Date 20   Date Met      Progress:     Goal Identifier 3   Goal Description Pt will report adherence to, daily, BUE HEP to address strength and endurance at all tx sessions to improve function needed for I/ADLs.    Target Date 20   Date Met      Progress:     Goal Identifier 4   Goal Description Pt will report making meal involving at least 3 steps (ie chopping vegetables, cooking rice, mixing ingrediiants together) due to improved strength, endurance, FMC and less pain in hands.    Target Date 20   Date Met      Progress:     Goal Identifier 5   Goal Description Pt will verbalize understanding of results of cognitive assessment, functional implications of score and at least 3 activities to improve cognition and at least 3 compensatory strategies as memory is improving for increase success with ADL/IADL.   Target Date 20   Date Met      Progress:     Goal Identifier     Goal  Description     Target Date     Date Met      Progress:     Goal Identifier     Goal Description     Target Date     Date Met      Progress:     Goal Identifier     Goal Description     Target Date     Date Met      Progress:     Progress Toward Goals:   Pt is making slow progress due to poor sleep, pain from neuropathy in hands and feet, and missing a few appointments.  Pt is compliant with home program.      Plan:  Changes to therapy plan of care: extend duration of treatment to 8 more weeks; 1x/wk.     Discharge:  No

## 2020-10-01 NOTE — RESULT ENCOUNTER NOTE
Aleksey Manrique   There is excess protein in your urine.   Schedule a follow up with nephrology at [facility] ([phone]) formerly called [facility] for further evaluation.   Do not take ibuprofen, aleve or other nonsteroidal antiinflammatory medications.  Make sure you are drinking plenty of water.   The good news is that the kidney function test was normal.  Continue the lisinopril- this is beneficial to the kidneys   Please call or MyChart my office with any questions or concerns.    Crissy Madrigal, PAC

## 2020-10-02 ENCOUNTER — MYC MEDICAL ADVICE (OUTPATIENT)
Dept: FAMILY MEDICINE | Facility: CLINIC | Age: 30
End: 2020-10-02

## 2020-10-02 DIAGNOSIS — R80.9 MICROALBUMINURIA: Primary | ICD-10-CM

## 2020-10-06 ENCOUNTER — TELEPHONE (OUTPATIENT)
Dept: NEUROLOGY | Facility: CLINIC | Age: 30
End: 2020-10-06

## 2020-10-06 ENCOUNTER — ANCILLARY PROCEDURE (OUTPATIENT)
Dept: ULTRASOUND IMAGING | Facility: CLINIC | Age: 30
End: 2020-10-06
Attending: PHYSICIAN ASSISTANT
Payer: COMMERCIAL

## 2020-10-06 DIAGNOSIS — E83.51 HYPOCALCEMIA: ICD-10-CM

## 2020-10-06 DIAGNOSIS — N93.9 VAGINAL BLEEDING: ICD-10-CM

## 2020-10-06 DIAGNOSIS — R73.9 ELEVATED RANDOM BLOOD GLUCOSE LEVEL: ICD-10-CM

## 2020-10-06 DIAGNOSIS — I26.99 PULMONARY EMBOLISM WITH INFARCTION (H): ICD-10-CM

## 2020-10-06 DIAGNOSIS — E87.6 HYPOKALEMIA: ICD-10-CM

## 2020-10-06 DIAGNOSIS — E78.1 HYPERTRIGLYCERIDEMIA: ICD-10-CM

## 2020-10-06 LAB
ANION GAP SERPL CALCULATED.3IONS-SCNC: 6 MMOL/L (ref 3–14)
BASOPHILS # BLD AUTO: 0 10E9/L (ref 0–0.2)
BASOPHILS NFR BLD AUTO: 0.4 %
BUN SERPL-MCNC: 11 MG/DL (ref 7–30)
CA-I SERPL ISE-MCNC: 4.7 MG/DL (ref 4.4–5.2)
CALCIUM SERPL-MCNC: 8 MG/DL (ref 8.5–10.1)
CHLORIDE SERPL-SCNC: 110 MMOL/L (ref 94–109)
CO2 SERPL-SCNC: 27 MMOL/L (ref 20–32)
CREAT SERPL-MCNC: 0.73 MG/DL (ref 0.52–1.04)
DIFFERENTIAL METHOD BLD: ABNORMAL
EOSINOPHIL # BLD AUTO: 0.1 10E9/L (ref 0–0.7)
EOSINOPHIL NFR BLD AUTO: 0.7 %
ERYTHROCYTE [DISTWIDTH] IN BLOOD BY AUTOMATED COUNT: 19.9 % (ref 10–15)
GFR SERPL CREATININE-BSD FRML MDRD: >90 ML/MIN/{1.73_M2}
GLUCOSE SERPL-MCNC: 73 MG/DL (ref 70–99)
HCT VFR BLD AUTO: 30.7 % (ref 35–47)
HGB BLD-MCNC: 9.9 G/DL (ref 11.7–15.7)
IMM GRANULOCYTES # BLD: 0 10E9/L (ref 0–0.4)
IMM GRANULOCYTES NFR BLD: 0.3 %
INR PPP: 1.22 (ref 0.86–1.14)
LYMPHOCYTES # BLD AUTO: 2 10E9/L (ref 0.8–5.3)
LYMPHOCYTES NFR BLD AUTO: 27.7 %
MCH RBC QN AUTO: 32.8 PG (ref 26.5–33)
MCHC RBC AUTO-ENTMCNC: 32.2 G/DL (ref 31.5–36.5)
MCV RBC AUTO: 102 FL (ref 78–100)
MONOCYTES # BLD AUTO: 0.5 10E9/L (ref 0–1.3)
MONOCYTES NFR BLD AUTO: 6.5 %
NEUTROPHILS # BLD AUTO: 4.6 10E9/L (ref 1.6–8.3)
NEUTROPHILS NFR BLD AUTO: 64.4 %
PLATELET # BLD AUTO: 265 10E9/L (ref 150–450)
POTASSIUM SERPL-SCNC: 3.2 MMOL/L (ref 3.4–5.3)
PTH-INTACT SERPL-MCNC: 90 PG/ML (ref 18–80)
RBC # BLD AUTO: 3.02 10E12/L (ref 3.8–5.2)
SODIUM SERPL-SCNC: 143 MMOL/L (ref 133–144)
TRIGL SERPL-MCNC: 212 MG/DL
WBC # BLD AUTO: 7.2 10E9/L (ref 4–11)

## 2020-10-06 PROCEDURE — 85610 PROTHROMBIN TIME: CPT | Performed by: NURSE PRACTITIONER

## 2020-10-06 PROCEDURE — 80048 BASIC METABOLIC PNL TOTAL CA: CPT | Performed by: NURSE PRACTITIONER

## 2020-10-06 PROCEDURE — 84478 ASSAY OF TRIGLYCERIDES: CPT | Performed by: NURSE PRACTITIONER

## 2020-10-06 PROCEDURE — 36415 COLL VENOUS BLD VENIPUNCTURE: CPT | Performed by: NURSE PRACTITIONER

## 2020-10-06 PROCEDURE — 82306 VITAMIN D 25 HYDROXY: CPT | Performed by: NURSE PRACTITIONER

## 2020-10-06 PROCEDURE — 83970 ASSAY OF PARATHORMONE: CPT | Performed by: NURSE PRACTITIONER

## 2020-10-06 PROCEDURE — 76830 TRANSVAGINAL US NON-OB: CPT

## 2020-10-06 PROCEDURE — 82330 ASSAY OF CALCIUM: CPT | Performed by: PHYSICIAN ASSISTANT

## 2020-10-06 PROCEDURE — 85025 COMPLETE CBC W/AUTO DIFF WBC: CPT | Performed by: NURSE PRACTITIONER

## 2020-10-06 PROCEDURE — 76856 US EXAM PELVIC COMPLETE: CPT | Mod: 59

## 2020-10-06 NOTE — TELEPHONE ENCOUNTER
[facility] Call Center    Phone Message    May a detailed message be left on voicemail: yes     Reason for Call: Other: Please call pt. Thank you.     Action Taken: Message routed to:  [facility] ([facility]    Travel Screening: Not Applicable

## 2020-10-07 DIAGNOSIS — E87.6 HYPOKALEMIA: Primary | ICD-10-CM

## 2020-10-07 LAB — DEPRECATED CALCIDIOL+CALCIFEROL SERPL-MC: 43 UG/L (ref 20–75)

## 2020-10-07 RX ORDER — POTASSIUM CHLORIDE 1500 MG/1
20 TABLET, EXTENDED RELEASE ORAL 2 TIMES DAILY
Qty: 2 TABLET | Refills: 0 | Status: SHIPPED | OUTPATIENT
Start: 2020-10-07 | End: 2021-01-19

## 2020-10-07 NOTE — TELEPHONE ENCOUNTER
Pt requesting a revised letter from one written on 9/14.  Pt has not worked since April and employer is requesting a letter more specific regarding patient's ability to work and anticipated return to work/end date for her treatment(s).  Patient asking that provider attach/release the letter to [employer].

## 2020-10-07 NOTE — RESULT ENCOUNTER NOTE
Hi Hilaria,   Your potassium was a little low. I will send some additional potassium to your pharmacy to take. Make sure to follow up with Nephrology like Crissy TOVAR had ordered. Please call if you have questions.   Thank you,  RG Raygoza, NP-C  [facility]

## 2020-10-09 ENCOUNTER — HOSPITAL ENCOUNTER (OUTPATIENT)
Dept: PHYSICAL THERAPY | Facility: CLINIC | Age: 30
Setting detail: THERAPIES SERIES
End: 2020-10-09
Attending: PHYSICIAN ASSISTANT
Payer: COMMERCIAL

## 2020-10-09 ENCOUNTER — HOSPITAL ENCOUNTER (OUTPATIENT)
Dept: OCCUPATIONAL THERAPY | Facility: CLINIC | Age: 30
Setting detail: THERAPIES SERIES
End: 2020-10-09
Attending: PHYSICIAN ASSISTANT
Payer: COMMERCIAL

## 2020-10-09 ENCOUNTER — VIRTUAL VISIT (OUTPATIENT)
Dept: PALLIATIVE MEDICINE | Facility: CLINIC | Age: 30
End: 2020-10-09
Attending: NURSE PRACTITIONER
Payer: COMMERCIAL

## 2020-10-09 DIAGNOSIS — G61.81 CHRONIC INFLAMMATORY DEMYELINATING POLYNEUROPATHY (H): Primary | ICD-10-CM

## 2020-10-09 PROCEDURE — 97530 THERAPEUTIC ACTIVITIES: CPT | Mod: GO | Performed by: OCCUPATIONAL THERAPIST

## 2020-10-09 PROCEDURE — 97116 GAIT TRAINING THERAPY: CPT | Mod: GP | Performed by: PHYSICAL THERAPIST

## 2020-10-09 PROCEDURE — 96156 HLTH BHV ASSMT/REASSESSMENT: CPT | Mod: 95 | Performed by: PSYCHOLOGIST

## 2020-10-09 PROCEDURE — 97110 THERAPEUTIC EXERCISES: CPT | Mod: GP | Performed by: PHYSICAL THERAPIST
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
## 2020-10-11 ENCOUNTER — TELEPHONE (OUTPATIENT)
Dept: FAMILY MEDICINE | Facility: CLINIC | Age: 30
End: 2020-10-11

## 2020-10-11 DIAGNOSIS — E87.6 HYPOKALEMIA: ICD-10-CM

## 2020-10-11 NOTE — TELEPHONE ENCOUNTER
Patient is requesting refill for potassium chloride ER (KLOR-CON M) 20 MEQ CR tablet.              Kj Faarax  [facility]

## 2020-10-12 RX ORDER — POTASSIUM CHLORIDE 1500 MG/1
20 TABLET, EXTENDED RELEASE ORAL 2 TIMES DAILY
Qty: 2 TABLET | Refills: 0 | Status: CANCELLED | OUTPATIENT
Start: 2020-10-12

## 2020-10-12 NOTE — TELEPHONE ENCOUNTER
Requested Prescriptions   Pending Prescriptions Disp Refills     potassium chloride ER (KLOR-CON M) 20 MEQ CR tablet 2 tablet 0     Sig: Take 1 tablet (20 mEq) by mouth 2 times daily       There is no refill protocol information for this order        Routing refill request to provider for review/approval because:  Drug not active on patient's medication list      Ayana Tay RN, BSN, PHN

## 2020-10-13 NOTE — TELEPHONE ENCOUNTER
She was just given 2 pills for the low potassium.  No additional refill indicated.  Notify patient that short term supplement recommended at this time.     ALEX

## 2020-10-13 NOTE — PROGRESS NOTES
"Hilaria Bonner is a 29 year old female who is being evaluated via a billable telephone visit.      The patient has been notified of following:     \"This telephone visit will be conducted via a call between you and your physician/provider. We have found that certain health care needs can be provided without the need for a physical exam.  This service lets us provide the care you need with a short phone conversation.  If a prescription is necessary we can send it directly to your pharmacy.  If lab work is needed we can place an order for that and you can then stop by our lab to have the test done at a later time.    Telephone visits are billed at different rates depending on your insurance coverage. During this emergency period, for some insurers they may be billed the same as an in-person visit.  Please reach out to your insurance provider with any questions.    If during the course of the call the physician/provider feels a telephone visit is not appropriate, you will not be charged for this service.\"    Patient has given verbal consent for Telephone visit?  Yes    What phone number would you like to be contacted at? [phone]    How would you like to obtain your AVS? [facility]  [place]    Recommendations at last vist on 9/17/2020:            1.  Pain Physical Therapy:                 Continue Neuro PT and OT as planned. Hilaria today reports some mild improvements, encouraged she continue on a regular basis. We may consider pain physical therapy in the future.               2.  Pain Psychologist to address relaxation, behavioral change, coping style, and other factors important to improvement.                YES, would highly recommend this resource. Though initially hesitant, Hilaria thinks this could be helpful and her mother strongly encouraged. Order placed. They will call to schedule.               3.  Medication Management:                "            1. Continue Lyrica 200mg three times daily- max dose.                           2. Continue increased dose of Cymbalta 90mg daily-reached this dose on Monday so unclear of additional benefit. May consider additional increase up to 120mg daily.                            3. Discussed addition of Topamax, may have additional neuropathic pain benefit. She is interested and will start Topamax as directed below. Monitor pain benefit. Call with issues. She understands she CANNOT drink alcohol on this medication and it is a pregnancy category X. If she is sexually active, I recommend two forms of birth control (already on Depot).   1 tab= 25mg  AM                                       PM  0                                           25mg (1 tab).  If tolerating, after 1 week go to the next line.  25mg (1 tab)                        25mg (1 tab).  If tolerating, after 1 week go to the next line.  25mg (1 tab)                        50mg (2 tabs).  If tolerating, after 1 week go to the next line.  50mg (2 tabs)                      50mg (2 tabs). Call us when you are at this dose or with any concerns  -remain well hydrated, due to risk of kidney stones  -do not drive until you know how it affects you  -new numbness or tingling in the toes may be related to how well hydrated you are- if this occurs  - drink more fluids and it should get better                          4. We may consider capsaicin cream as an adjuvant in the future.                           5. We discussed the UDS that was positive for alcohol and subsequent ETG testing. This is concerning for regular heavy alcohol intake. Both Hilaria and her mother deny this and state she does not drink on a regular basis. Regardless, opioids will not be an option for chronic pain management. They understand that we will not continue hydrocodone/tylenol or other opioids moving forward.              4.  Referrals: continue evaluation and treatment with Neurology.  "We may consider the addition of acupuncture in the future.              5.  Follow up with RG Puri CNP in 4 weeks.     Additional provider notes:  -Her pain is worse than it was at last visit.  -She attributes this to be due to the recent cold weather. She thinks warm gloves and compression gloves may help, OT gave her some direction on this.  -She started Topamax and is now taking 75mg at bedtime. She didn't understand that she was to take 50mg BID. She denies side effects but comments that her migraines seem more frequent, lately 4x a week.   -She continues Cymbalta 90mg daily, had only recently reached this dose at last visit. Denies side effects and is unsure of benefit.   -She also comments that she hasn't been sleeping well lately and has been stressed, wonders if that is part of the reason she has had more headaches. She also states that prior to Topamax her migraines were very infrequent.   -She has had x1 visit with Jasmin Prince PsyD, LP, states that she asked a lot of questions but isn't sure how helpful this will be. She plans to have follow up as directed.   -She continues neuro physical therapy and occupational therapy. She may start having every other week visits with physical therapy at the end of this month. Her occupational therapist wants her to start working in the kitchen more, she has some hesitation about this because of her hand pain.   -She has had one session of IVIG since last visit, has another scheduled for tomorrow. She isn't sure how much this has been helpful.       Current pain medications:              Lyrica 200mg TID- [facility], \"a little bit\"    Topamax 50mg BID- ?, W, misunderstood and has been taking 75mg at bedtime, headaches seem more frequent but may be due to lack of sleep              Norco 5/325mg BID- [facility], UDS positive for alcohol and ETG confirmatory, understands this or other opioids will not be prescribed, ran out of this 1-2 weeks " "ago              Cymbalta 90mg daily- ?              Hydroxyzine 25-50mg prn- H for anxiety, ran out of              Folate 1mg daily     1. Previous Pain Relevant Medications:  NOTE: This medication information taken from patient's intake form, not medical records.               Opiates: Tramadol- [facility], oxycodone- ?/[facility]              NSAIDS: doesn't take anti-inflammatories due to gastric bypass              Muscle Relaxants: tizanidine- H for sleep only              Anti-migraine mediations: no              Anti-depressants: amitriptyline (up to 75mg)- ?, \"it put me to sleep\", Cymbalta- ?              Sleep aids: no              Anxiolytics: hydroxyzine- H               Neuropathics: Lyrica- [facility], gabapentin (started on for numbness in toes and migraines after cardiac arrest)- SE, fatigue, Topamax- H for weight loss              Topicals: gabapentin cream- NH/[facility], lidocaine cream- NH, W, burned more, capsaicin cream- NH, W              Other medications not covered above: Tylenol- NH     2. Physical Therapy: going to neuro PT and OT   3. Pain Psychology: yes Jasmin Prince PsyD  x1 visit   4. Surgery: gastric bypass March 2019  5. Injections: no  6. Chiropractic: no  7. Acupuncture: no but open to this resource  8. TENS Unit: no    Assessment:  Hilaria Bonner is a 29 year old female with a past medical history significant for PE with associated PEA arrest, morbid obesity s/p sleeve gastrectomy, pancreatitis, and recent COVID 19 who presents with complaints of upper and lower extremity pain.      1. 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   2. Mental Health - the patient's mental health concerns, specifically anxiety, affect her experience of pain and contribute to her clinically significant distress.     1. Chronic inflammatory demyelinating polyneuropathy (H)    2. Paresthesias  "          Plan:     1.  Pain Physical Therapy:      NO   Continue neuro physical therapy and occupational therapy on a regular basis as planned.    2.  Pain Psychologist to address relaxation, behavioral change, coping style, and other factors important to improvement.     YES  Have follow up with Jasmin Prince PsyD, LP as recommended and as able. This resource should be a priority.    3.  Medication Management:     1. Continue Lyrica 200mg TID- max dose.     2. Recent increase in Cymbalta has not been notable but no side effects. We discussed attempting max dose in hope of improved pain and she is interested. Increase Cymbalta to 60mg BID for a total dose of 120mg daily- max dose. Okay to use up remaining 30mg capsules.     3. Initiation of Topamax has not seemed to be helpful and she thinks may have worsened headaches (though has been sleeping poorly). We discussed increasing to determine if pain benefit or tapering off and she wants to try dose increase. Adjust Topamax dosing to 50mg BID for 1 week. Then increase to 50mg in the morning and 100mg in the evening for 1 week. If this goes okay, increase to 100mg BID and stay at this dose. If headaches worsen or if she has other bothersome side effects, let me know. If not having at least some benefit by next visit we will taper off.    4. Discussed lidocaine cream, Voltaren gel, and capsaicin cream today. She thinks lidocaine may have made her pain worse and capsaicin cream may have burned. I do think it would be reasonable to attempt another cream. She would like to start with Voltaren gel next. Apply to hands and feet up to 4x daily as needed. If not helpful, we may consider a repeat trial of capsaicin cream as tolerated.   4.  Potential procedures: no    5.  Referrals: acupuncture may be helpful and addresses pain from a different perspective. She is open to this. Order placed. Schedule visits once weekly as able. Monitor pain benefit.    6.  Follow up with Janki  RG Goodman CNP in 4-6 weeks.     Phone call duration: 26 minutes    RG Ibrahim CNP

## 2020-10-14 ENCOUNTER — HOME INFUSION (PRE-WILLOW HOME INFUSION) (OUTPATIENT)
Dept: PHARMACY | Facility: CLINIC | Age: 30
End: 2020-10-14

## 2020-10-15 ENCOUNTER — OFFICE VISIT (OUTPATIENT)
Dept: OBGYN | Facility: CLINIC | Age: 30
End: 2020-10-15
Payer: COMMERCIAL

## 2020-10-15 ENCOUNTER — HOSPITAL ENCOUNTER (OUTPATIENT)
Dept: PHYSICAL THERAPY | Facility: CLINIC | Age: 30
Setting detail: THERAPIES SERIES
End: 2020-10-15
Attending: PHYSICIAN ASSISTANT
Payer: COMMERCIAL

## 2020-10-15 ENCOUNTER — HOME INFUSION (PRE-WILLOW HOME INFUSION) (OUTPATIENT)
Dept: PHARMACY | Facility: CLINIC | Age: 30
End: 2020-10-15

## 2020-10-15 ENCOUNTER — HOSPITAL ENCOUNTER (OUTPATIENT)
Dept: OCCUPATIONAL THERAPY | Facility: CLINIC | Age: 30
Setting detail: THERAPIES SERIES
End: 2020-10-15
Attending: PHYSICIAN ASSISTANT
Payer: COMMERCIAL

## 2020-10-15 ENCOUNTER — VIRTUAL VISIT (OUTPATIENT)
Dept: PALLIATIVE MEDICINE | Facility: CLINIC | Age: 30
End: 2020-10-15
Payer: COMMERCIAL

## 2020-10-15 VITALS
SYSTOLIC BLOOD PRESSURE: 147 MMHG | WEIGHT: 269.1 LBS | OXYGEN SATURATION: 100 % | DIASTOLIC BLOOD PRESSURE: 100 MMHG | HEART RATE: 106 BPM | BODY MASS INDEX: 41.83 KG/M2

## 2020-10-15 DIAGNOSIS — N93.9 ABNORMAL UTERINE BLEEDING: Primary | ICD-10-CM

## 2020-10-15 DIAGNOSIS — G61.81 CHRONIC INFLAMMATORY DEMYELINATING POLYNEUROPATHY (H): Primary | ICD-10-CM

## 2020-10-15 DIAGNOSIS — R20.2 PARESTHESIAS: ICD-10-CM

## 2020-10-15 DIAGNOSIS — R93.89 ENDOMETRIAL THICKENING ON ULTRASOUND: ICD-10-CM

## 2020-10-15 PROCEDURE — 97110 THERAPEUTIC EXERCISES: CPT | Mod: GP | Performed by: PHYSICAL THERAPIST

## 2020-10-15 PROCEDURE — 97530 THERAPEUTIC ACTIVITIES: CPT | Mod: GO | Performed by: OCCUPATIONAL THERAPIST

## 2020-10-15 PROCEDURE — 99203 OFFICE O/P NEW LOW 30 MIN: CPT | Mod: 25 | Performed by: OBSTETRICS & GYNECOLOGY

## 2020-10-15 PROCEDURE — 97112 NEUROMUSCULAR REEDUCATION: CPT | Mod: GP | Performed by: PHYSICAL THERAPIST

## 2020-10-15 PROCEDURE — 58100 BIOPSY OF UTERUS LINING: CPT | Performed by: OBSTETRICS & GYNECOLOGY

## 2020-10-15 PROCEDURE — 88305 TISSUE EXAM BY PATHOLOGIST: CPT | Performed by: PATHOLOGY

## 2020-10-15 PROCEDURE — 99213 OFFICE O/P EST LOW 20 MIN: CPT | Mod: 95 | Performed by: NURSE PRACTITIONER

## 2020-10-15 RX ORDER — DULOXETIN HYDROCHLORIDE 60 MG/1
60 CAPSULE, DELAYED RELEASE ORAL 2 TIMES DAILY
Qty: 60 CAPSULE | Refills: 1 | Status: SHIPPED | OUTPATIENT
Start: 2020-10-15 | End: 2020-12-21

## 2020-10-15 RX ORDER — TOPIRAMATE 50 MG/1
100 TABLET, FILM COATED ORAL 2 TIMES DAILY
Qty: 60 TABLET | Refills: 1 | Status: SHIPPED | OUTPATIENT
Start: 2020-10-15 | End: 2020-11-19

## 2020-10-15 RX ORDER — HYDROXYZINE HYDROCHLORIDE 25 MG/1
25-50 TABLET, FILM COATED ORAL EVERY 6 HOURS PRN
Qty: 90 TABLET | Refills: 1 | Status: SHIPPED | OUTPATIENT
Start: 2020-10-15 | End: 2021-01-29

## 2020-10-15 ASSESSMENT — PAIN SCALES - GENERAL: PAINLEVEL: WORST PAIN (10)

## 2020-10-15 NOTE — PROGRESS NOTES
Hilaria is a 29 year old  referred here by MARCOS TOVAR with complaint of abnormal uterine bleeding.    She has not had a menses in over a year due to Depo Provera.  She has been on the Depo Provera in May,  2019.  She had the Depo last on 2020.   She has not missed any of the Depo Provera infections.   She had heavy bleeding for about 3 weeks, bleeding through an extra thick pad every hour,  and she had blood clots every time she went to the bathroom.    She has not had any associated dizziness or fainting.  She has not had any associated pelvic pain.   She was hospitalized in [place] in May 2019 due to DVT that progressed to PE (and cardiac arrest).  She had been on combination contraception prior to the DVT and PE. It seems that the precipitating factor was the long drive to [place], [place] (her mother was driving).  They were able to get out of the car and stretch.  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      She had the pelvic ultrasound and the following report and the films are reviewed with her.    EXAMINATION: US PELVIC COMPLETE WITH TRANSVAGINAL 10/6/2020 2:26 PM    CLINICAL HISTORY: Breakthrough bleeding on Depo-Provera. History of   section.  Vaginal bleeding  COMPARISON: None  PROCEDURE COMMENT: Both transabdominal and transvaginal imaging performed of the pelvis  FINDINGS:  The uterus measures  3.9 cm x 8.0 cm x 4.9 cm. No focal myometrial mass.   The endometrial stripe measures 18 mm. The endometrial stripe is heterogeneous and hypoechoic. No increase blood flow.  The right ovary was not visualized. The left ovary measures 2.0 cm x 2.6 cm x 2.8 cm with blood flow.  There is no simple free fluid in the pelvis.  No adnexal mass.   IMPRESSION:  1. Thickened endometrium. Consider GYN consultation.      Component      Latest Ref Rng & Units 2020   WBC      4.0 - 11.0 10e9/L 5.0   RBC Count      3.8 - 5.2 10e12/L 3.52 (L)   Hemoglobin      11.7 -  15.7 g/dL 11.2 (L)   Hematocrit      35.0 - 47.0 % 34.4 (L)   Platelet Count      150 - 450 10e9/L 350   HCG Quantitative Serum      0 - 5 IU/L <1       Past Medical History:   Diagnosis Date     Acute kidney injury (H) 2019     Cardiac arrest (H) 2019     Earache or other ear, nose, or throat complaint 12/15    tyroid     Pulmonary embolus (H) 2019       Past Surgical History:   Procedure Laterality Date     GYN SURGERY      2 C-sections     KNEE SURGERY  most recently - [id number]    3 surgeries in [place]     LAPAROSCOPIC GASTRIC SLEEVE N/A 3/26/2019    Procedure: Laparoscopic Sleeve Gastrectomy;  Surgeon: Luan Lopez MD;  Location: [facility] OR     ORTHOPEDIC SURGERY      2 knee meniscus surgery       OB History    Para Term  AB Living   2 2 2 0 0 2   SAB TAB Ectopic Multiple Live Births   0 0 0 0 2      # Outcome Date GA Lbr Kev/2nd Weight Sex Delivery Anes PTL Lv   2 Term     M CS-LTranv   PAKO   1 Term     M CS-LTranv   PAKO       Gynecological History         Patient's last menstrual period was 2020 (approximate).    No STD/No PID/No IUD      see above HPI        No Known Allergies    Current Outpatient Medications   Medication Sig Dispense Refill     cholecalciferol 50 MCG ( UT) tablet Take 1 tablet by mouth daily 90 tablet 3     diphenhydrAMINE (BENADRYL) 50 MG/ML injection        DULoxetine (CYMBALTA) 30 MG capsule Take 3 capsules (90 mg) by mouth daily 60 capsule 3     EPINEPHrine (ANY BX GENERIC EQUIV) 0.3 MG/0.3ML injection 2-pack        folic acid (FOLVITE) 1 MG tablet Take 1 tablet (1 mg) by mouth daily 30 tablet 11     lisinopril (ZESTRIL) 10 MG tablet Take 1 tablet (10 mg) by mouth daily 90 tablet 1     Multiple Vitamins-Minerals (MULTIVITAMIN ADULTS) TABS Take 1 tablet by mouth daily       polyethylene glycol (MIRALAX) 17 GM/SCOOP powder Take 17 g (1 capful) by mouth daily 850 g 3     Pregabalin (LYRICA) 200 MG capsule Take 1 capsule (200 mg) by mouth 3 times  daily 90 capsule 3     PRIVIGEN 40 GM/400ML SOLN        rivaroxaban ANTICOAGULANT (XARELTO ANTICOAGULANT) 20 MG TABS tablet Take 1 tablet (20 mg) by mouth daily (with dinner) 30 tablet 0     SOLU-CORTEF 100 MG injection        topiramate (TOPAMAX) 25 MG tablet Take 2 tablets (50 mg) by mouth 2 times daily 120 tablet 1     vitamin (B COMPLEX) tablet Take 1 tablet by mouth daily 90 tablet 3     vitamin C (ASCORBIC ACID) 1000 MG TABS Take 1 tablet (1,000 mg) by mouth daily 30 tablet 0     vitamin D3 (CHOLECALCIFEROL) 2000 units (50 mcg) tablet Take 1 tablet (2,000 Units) by mouth daily 30 tablet 0     hydrOXYzine (ATARAX) 25 MG tablet Take 1-2 tablets (25-50 mg) by mouth every 6 hours as needed for other (adjuvant pain) (Patient not taking: Reported on 10/15/2020) 150 tablet 0     melatonin 1 MG TABS tablet Take 1 tablet (1 mg) by mouth nightly as needed for sleep (Patient not taking: Reported on 10/15/2020)       phenylephrine-shark liver oil-mineral oil-petrolatum (PREPARATION H) 0.25-14-74.9 % rectal ointment Place rectally 2 times daily as needed for hemorrhoids (Patient not taking: Reported on 10/15/2020)         Social History     Socioeconomic History     Marital status: Single     Spouse name: Not on file     Number of children: 2     Years of education: Not on file     Highest education level: Not on file   Occupational History     Not on file   Social Needs     Financial resource strain: Not on file     Food insecurity     Worry: Not on file     Inability: Not on file     Transportation needs     Medical: Not on file     Non-medical: Not on file   Tobacco Use     Smoking status: Former Smoker     Packs/day: 0.30     Years: 1.00     Pack years: 0.30     Types: Cigarettes     Start date: 2016     Quit date: 2018     Years since quittin.7     Smokeless tobacco: Never Used   Substance and Sexual Activity     Alcohol use: Not Currently     Alcohol/week: 0.0 standard drinks     Comment: occasional      Drug use: No     Sexual activity: Yes     Partners: Male     Birth control/protection: Injection   Lifestyle     Physical activity     Days per week: Not on file     Minutes per session: Not on file     Stress: Not on file   Relationships     Social connections     Talks on phone: Not on file     Gets together: Not on file     Attends Congregation service: Not on file     Active member of club or organization: Not on file     Attends meetings of clubs or organizations: Not on file     Relationship status: Not on file     Intimate partner violence     Fear of current or ex partner: Not on file     Emotionally abused: Not on file     Physically abused: Not on file     Forced sexual activity: Not on file   Other Topics Concern     Parent/sibling w/ CABG, MI or angioplasty before 65F 55M? Not Asked   Social History Narrative     Not on file       Family History   Problem Relation Age of Onset     Diabetes Father      Hypertension Father      Breast Cancer Sister 26        surgery only     Cancer Sister      Coronary Artery Disease Other         paternal aunt     Thyroid Disease Other         neice     Coronary Artery Disease Other      Cerebrovascular Disease Other      Breast Cancer Sister      Thyroid Disease Other      Cerebrovascular Disease No family hx of          Review of Systems:  10 point ROS of systems including Constitutional, Eyes, Respiratory, Cardiovascular, Gastroenterology, Genitourinary, Integumentary, Muscularskeletal, Psychiatric were all negative except for pertinent positives noted in my HPI and in the PMH.          EXAM:  BP (!) 147/100 (BP Location: Right arm, Cuff Size: Adult Large)   Pulse 106   Wt 122.1 kg (269 lb 1.6 oz)   LMP 09/18/2020 (Approximate)   SpO2 100%   BMI 41.83 kg/m    Body mass index is 41.83 kg/m .  General Appearance:  healthy, alert, active, no distress  Skin:  Normal skin turgor  Neuro:  Alert, cranial nerves grossly intact  HEENT: NCAT  Neck:  No masses or lesions  carotids are +2/4. No bruits heard  Lungs:  Good respiratory effort   Abdomen: Soft, nontender.  Normal bowel sounds.  No masses  Vulva: No external lesions, normal hair distribution, no adenopathy  BUS:  Normal, no masses noted  Urethral meatus:  No masses noted  Urethra:  No hypermobility  Vagina: Moist, pink, no abnormal discharge, well rugated, no lesions  Cervix: Smooth, pink, no visible lesions  Uterus: Normal size, anteverted, non-tender, mobile  Ovaries: No mass, non-tender, mobile  Extremities:  No clubbing, cyanosis or edema.        ASSESSMENT:  Breakthrough bleeding on Depo Provera   Abnormal thickening on ultrasound.       PLAN:  We discussed the bleeding profiles as people age and approach menopause.  Even though menstrual changes and irregularities are common and expected, they may also indicate or precipitate endometrial abnormalities. We also reviewed the bleeding profiles with Depo Provera.  It is common for breakthrough bleeding.  We reviewed the long term use of Depo Provera and the black box warning regarding bone density.  The decrease in bone density is similar to the bone loss with lactation, that is largely reversible upon discontinuation of the medication.   The patient and I discussed the options for evaluation.  The EMB, SIS and the D&C were reviewed with her.  The EMB will not remove a polyp, but will give a tissue sample.  The SIS will further evaluate the lining, but no tissue sample, and should she have a suspected polyp, it would not be removed.  The D&C is more expensive but is currently the gold standard.   She desires to proceed with the EMB.    Together we reviewed the risks and benefits of medical versus surgical therapy.  Medical therapy reviewed included hormonal manipulation with OCP's, Patch, Ring, Depo, or IUD.   Her options, however, are limited due to the DVT, PE and cardiac arrest she had in August, 2019.    Questions seem to be answered.     Evgeny Cheatham,  MD      PROCEDURE NOTE:  The procedure was reviewed with patient.  After consenting to the procedure she was placed into the dorsal lithotomy position.  The examination was performed.  The speculum was placed into the vagina.  The cervix was prepped with Betadine.  The anterior lip of the cervix was grasped with the Allis tenaculum.  The uterus was sounded to 8 cm.  The Pipelle was used to sample the endometrium.    Instruments were removed and the specimen was sent to pathology.  Results to the patient in 1-2 weeks when they are returned.    Evgeny Cheatham MD

## 2020-10-15 NOTE — PROGRESS NOTES
This is a recent snapshot of the patient's [facility] medical record.  For current drug dose and complete information and questions, call [phone] or In [facility] pool, [facility] infusion ([id number])  CSN Number:  [id number]

## 2020-10-15 NOTE — PATIENT INSTRUCTIONS
1.  Pain Physical Therapy:      NO   Continue neuro physical therapy and occupational therapy as planned.    2.  Pain Psychologist to address relaxation, behavioral change, coping style, and other factors important to improvement.     YES  Have follow up with Jasmin Prince PsyD, LP as recommended and as able. This resource should be a priority.    3.  Medication Management:     1. Continue Lyrica 200mg three times daily.    2. Increase Cymbalta to 60mg twice daily for a total dose of 120mg daily. Okay to use up remaining 30mg capsules. This is the max dose of this medication.     3. Adjust Topamax dosing to 50mg twice daily for 1 week. Then increase to 50mg in the morning and 100mg in the evening for 1 week. If this goes okay, increase to 100mg twice daily and stay at this dose. If headaches worsen or if you have other bothersome side effects, let me know. If not having at least some benefit by next visit we will taper off.    4. Lets try Voltaren gel next. Apply to hands and feet up to 4x daily as needed. If not helpful, we may consider a repeat trial of capsaicin cream again.    4.  Potential procedures: no    5.  Referrals: acupuncture may be helpful. Order placed. Schedule visits once weekly as able. Monitor pain benefit.    6.  Follow up with RG Puri CNP in 4-6 weeks.       ----------------------------------------------------------------  Clinic Number:  [phone]     Call with any questions about your care and for scheduling assistance.     Calls are returned Monday through Friday between 8 AM and 4:30 PM. We usually get back to you within 2 business days depending on the issue/request.    If we are prescribing your medications:    For opioid medication refills, call the clinic or send a Student Designed message 7 days in advance.  Please include:    Name of requested medication    Name of the pharmacy.    For non-opioid medications, call your pharmacy directly to request a refill. Please allow 3-4 days  to be processed.     Per [place] State Law:    All controlled substance prescriptions must be filled within 30 days of being written.      For those controlled substances allowing refills, pickup must occur within 30 days of last fill.      We believe regular attendance is key to your success in our program!      Any time you are unable to keep your appointment we ask that you call us at least 24 hours in advance to cancel.This will allow us to offer the appointment time to another patient.     Multiple missed appointments may lead to dismissal from the clinic.

## 2020-10-16 ENCOUNTER — HOME INFUSION (PRE-WILLOW HOME INFUSION) (OUTPATIENT)
Dept: PHARMACY | Facility: CLINIC | Age: 30
End: 2020-10-16

## 2020-10-16 ENCOUNTER — DOCUMENTATION ONLY (OUTPATIENT)
Dept: PHARMACY | Facility: CLINIC | Age: 30
End: 2020-10-16

## 2020-10-16 ENCOUNTER — VIRTUAL VISIT (OUTPATIENT)
Dept: FAMILY MEDICINE | Facility: CLINIC | Age: 30
End: 2020-10-16
Payer: COMMERCIAL

## 2020-10-16 ENCOUNTER — TELEPHONE (OUTPATIENT)
Dept: FAMILY MEDICINE | Facility: CLINIC | Age: 30
End: 2020-10-16

## 2020-10-16 DIAGNOSIS — I26.99 PULMONARY EMBOLISM WITH INFARCTION (H): ICD-10-CM

## 2020-10-16 DIAGNOSIS — D64.9 ANEMIA, UNSPECIFIED TYPE: ICD-10-CM

## 2020-10-16 DIAGNOSIS — E87.6 HYPOKALEMIA: ICD-10-CM

## 2020-10-16 DIAGNOSIS — F32.1 MODERATE MAJOR DEPRESSION (H): ICD-10-CM

## 2020-10-16 DIAGNOSIS — N92.1 MENORRHAGIA WITH IRREGULAR CYCLE: ICD-10-CM

## 2020-10-16 DIAGNOSIS — R23.2 HOT FLASHES: ICD-10-CM

## 2020-10-16 DIAGNOSIS — G61.81 CIDP (CHRONIC INFLAMMATORY DEMYELINATING POLYNEUROPATHY) (H): ICD-10-CM

## 2020-10-16 DIAGNOSIS — F41.9 ANXIETY: ICD-10-CM

## 2020-10-16 DIAGNOSIS — I46.9 CARDIAC ARREST, CAUSE UNSPECIFIED (H): ICD-10-CM

## 2020-10-16 DIAGNOSIS — G47.00 INSOMNIA, UNSPECIFIED TYPE: Primary | ICD-10-CM

## 2020-10-16 DIAGNOSIS — R61 NIGHT SWEATS: ICD-10-CM

## 2020-10-16 DIAGNOSIS — Z53.9 ERRONEOUS ENCOUNTER--DISREGARD: Primary | ICD-10-CM

## 2020-10-16 DIAGNOSIS — E66.01 MORBID (SEVERE) OBESITY DUE TO EXCESS CALORIES (H): ICD-10-CM

## 2020-10-16 PROCEDURE — 96127 BRIEF EMOTIONAL/BEHAV ASSMT: CPT | Performed by: PHYSICIAN ASSISTANT

## 2020-10-16 PROCEDURE — 99214 OFFICE O/P EST MOD 30 MIN: CPT | Mod: 95 | Performed by: PHYSICIAN ASSISTANT

## 2020-10-16 RX ORDER — QUETIAPINE FUMARATE 25 MG/1
TABLET, FILM COATED ORAL
Qty: 60 TABLET | Refills: 0 | Status: SHIPPED | OUTPATIENT
Start: 2020-10-16 | End: 2020-11-16

## 2020-10-16 ASSESSMENT — ANXIETY QUESTIONNAIRES
GAD7 TOTAL SCORE: 6
3. WORRYING TOO MUCH ABOUT DIFFERENT THINGS: SEVERAL DAYS
7. FEELING AFRAID AS IF SOMETHING AWFUL MIGHT HAPPEN: SEVERAL DAYS
2. NOT BEING ABLE TO STOP OR CONTROL WORRYING: SEVERAL DAYS
IF YOU CHECKED OFF ANY PROBLEMS ON THIS QUESTIONNAIRE, HOW DIFFICULT HAVE THESE PROBLEMS MADE IT FOR YOU TO DO YOUR WORK, TAKE CARE OF THINGS AT HOME, OR GET ALONG WITH OTHER PEOPLE: EXTREMELY DIFFICULT
1. FEELING NERVOUS, ANXIOUS, OR ON EDGE: SEVERAL DAYS
6. BECOMING EASILY ANNOYED OR IRRITABLE: SEVERAL DAYS
5. BEING SO RESTLESS THAT IT IS HARD TO SIT STILL: NOT AT ALL

## 2020-10-16 ASSESSMENT — PATIENT HEALTH QUESTIONNAIRE - PHQ9
5. POOR APPETITE OR OVEREATING: SEVERAL DAYS
SUM OF ALL RESPONSES TO PHQ QUESTIONS 1-9: 16

## 2020-10-16 NOTE — TELEPHONE ENCOUNTER
.Reason for call:  Patient reporting a symptom    Symptom or request: migraines, night sweat and discuss depression     Duration (how long have symptoms been present): a    Have you been treated for this before? No    Additional comments: Patient would like to speak to a nurse today and stated that her home nurse is at her house.     Phone Number patient can be reached at:  Work number on file:  There is no work phone number on file. or Cell number on file:    Telephone Information:   Mobile [phone]       Best Time:  any    Can we leave a detailed message on this number:  YES    Call taken on 10/16/2020 at 9:44 AM by Irena Russo

## 2020-10-16 NOTE — PROGRESS NOTES
This is a recent snapshot of the patient's [facility] medical record.  For current drug dose and complete information and questions, call [phone] or In [facility] pool, [facility] home infusion ([id number])  CSN Number:  [id number]

## 2020-10-16 NOTE — PROGRESS NOTES
"Hilaria Bonner is a 29 year old female who is being evaluated via a billable telephone visit.      The patient has been notified of following:     \"This telephone visit will be conducted via a call between you and your physician/provider. We have found that certain health care needs can be provided without the need for a physical exam.  This service lets us provide the care you need with a short phone conversation.  If a prescription is necessary we can send it directly to your pharmacy.  If lab work is needed we can place an order for that and you can then stop by our lab to have the test done at a later time.    Telephone visits are billed at different rates depending on your insurance coverage. During this emergency period, for some insurers they may be billed the same as an in-person visit.  Please reach out to your insurance provider with any questions.    If during the course of the call the physician/provider feels a telephone visit is not appropriate, you will not be charged for this service.\"    Patient has given verbal consent for Telephone visit?  Yes    What phone number would you like to be contacted at? [phone]    How would you like to obtain your AVS? Ferdinand Rick     Hilaria Bonner is a 29 year old female who presents via phone visit today for the following health issues:    HPI     Depression Followup     How are you doing with your depression since your last visit? No change    Are you having other symptoms that might be associated with depression? No    Have you had a significant life event?  OTHER: just issues with neuopathy related to covid     Are you feeling anxious or having panic attacks?   Yes:  anxious    Do you have any concerns with your use of alcohol or other drugs? No    Social History     Tobacco Use     Smoking status: Former Smoker     Packs/day: 0.30     Years: 1.00     Pack years: 0.30     Types: Cigarettes     Start date: 11/20/2016     Quit date: 1/12/2018     " Years since quittin.7     Smokeless tobacco: Never Used   Substance Use Topics     Alcohol use: Not Currently     Alcohol/week: 0.0 standard drinks     Comment: occasional     Drug use: No     PHQ 2019 2020 10/16/2020   PHQ-9 Total Score 3 - 16   Q9: Thoughts of better off dead/self-harm past 2 weeks Not at all Not at all Not at all   PHQ-A Total Score - 23 -   PHQ-A Mood affect on daily activities - Extremely dIfficult -   PHQ-A Suicide Ideation past 2 weeks - Not at all -     MELODY-7 SCORE 2019 2020 10/16/2020   Total Score - - -   Total Score 4 21 6     Last PHQ-9 10/16/2020   1.  Little interest or pleasure in doing things 1   2.  Feeling down, depressed, or hopeless 1   3.  Trouble falling or staying asleep, or sleeping too much 3   4.  Feeling tired or having little energy 3   5.  Poor appetite or overeating 2   6.  Feeling bad about yourself 1   7.  Trouble concentrating 2   8.  Moving slowly or restless 3   Q9: Thoughts of better off dead/self-harm past 2 weeks 0   PHQ-9 Total Score 16   Difficulty at work, home, or with people Extremely dIfficult     MELODY-7  10/16/2020   1. Feeling nervous, anxious, or on edge 1   2. Not being able to stop or control worrying 1   3. Worrying too much about different things 1   4. Trouble relaxing 1   5. Being so restless that it is hard to sit still 0   6. Becoming easily annoyed or irritable 1   7. Feeling afraid, as if something awful might happen 1   MELODY-7 Total Score 6   If you checked any problems, how difficult have they made it for you to do your work, take care of things at home, or get along with other people? Extremely difficult         Suicide Assessment Five-step Evaluation and Treatment (SAFE-T)      How many servings of fruits and vegetables do you eat daily?  2-3 Varies.     On average, how many sweetened beverages do you drink each day (Examples: soda, juice, sweet tea, etc.  Do NOT count diet or artificially sweetened beverages)?    2-3    How many days per week do you exercise enough to make your heart beat faster? 7    How many minutes a day do you exercise enough to make your heart beat faster? 60 or more- walking an hour a day.    How many days per week do you miss taking your medication? 0  Patient well known to me with history of massive unprovoked PE with cardiac arrest and acute kidney injury now resolved on xarelto   Saw me end of last month with heavy vaginal bleeding  Telephone visit today to discuss Night sweats, migraines and depression  Migraines approximately 4 days a week reports that she noted that they started from last IV -September 18 -pain clinic put me back on topamax   Not able to sleep- reports topamax only helps with appetite- sometimes eat and sometimes doesn't   Pain clinic Increased cymbalta and increased topamax  Sleeps 1/2 hour to 1 hour and drenched in sweat and feels like temperature but doen't have fever.  Runs a fan all night   No longer taking gabapentin- is taking lyrica   On depo - last given on September 30 - yesterday saw obronyn and did endometrial biopsy-sometimes on depo can have heavy flow   Vaginal bleeding has resolved. Since endometrial biopsy yesterday have not been spotting.   Blood pressure was 126/86 as checked by home care nurse- gets IVIG for polyneuropathy thought to be secondary to covid   Is followed by neurology- but reports neurology has left a lot of plan up to pain clinic  Almost  2months since getting some good sleep, has tried melatonin, tylenol pm, zzquil, aleve pm, nyquil, sleep water, and left over amitriptyline- was helping with sleep in past and tried -2 capsules and no longer helpful   Sleeps with fan on - sweats only at night -does go out of the house  No work since beginning of April due to covid   Has chronic pain and neuropathy    Current Outpatient Medications   Medication         cholecalciferol 50 MCG (2000 UT) tablet     diclofenac (VOLTAREN) 1 % topical gel      diphenhydrAMINE (BENADRYL) 50 MG/ML injection     DULoxetine (CYMBALTA) 60 MG capsule     EPINEPHrine (ANY BX GENERIC EQUIV) 0.3 MG/0.3ML injection 2-pack     folic acid (FOLVITE) 1 MG tablet     hydrOXYzine (ATARAX) 25 MG tablet     lisinopril (ZESTRIL) 10 MG tablet     melatonin 1 MG TABS tablet     Multiple Vitamins-Minerals (MULTIVITAMIN ADULTS) TABS     phenylephrine-shark liver oil-mineral oil-petrolatum (PREPARATION H) 0.25-14-74.9 % rectal ointment     polyethylene glycol (MIRALAX) 17 GM/SCOOP powder     Pregabalin (LYRICA) 200 MG capsule     PRIVIGEN 40 GM/400ML SOLN     rivaroxaban ANTICOAGULANT (XARELTO ANTICOAGULANT) 20 MG TABS tablet     SOLU-CORTEF 100 MG injection     topiramate (TOPAMAX) 50 MG tablet     vitamin (B COMPLEX) tablet     vitamin C (ASCORBIC ACID) 1000 MG TABS     vitamin D3 (CHOLECALCIFEROL) 2000 units (50 mcg) tablet     Current Facility-Administered Medications   Medication     cyanocobalamin injection 1,000 mcg     medroxyPROGESTERone (DEPO-PROVERA) syringe 150 mg        Review of Systems   Constitutional, HEENT, cardiovascular, pulmonary, gi and gu systems are negative, except as otherwise noted.       Objective          Vitals:  No vitals were obtained today due to virtual visit.    healthy, alert and no distress  PSYCH: Alert and oriented times 3; coherent speech, normal   rate and volume, able to articulate logical thoughts, able   to abstract reason, no tangential thoughts, no hallucinations   or delusions  Her affect is normal and pleasant  RESP: No cough, no audible wheezing, able to talk in full sentences  Remainder of exam unable to be completed due to telephone visits            Assessment/Plan:    Assessment & Plan     Insomnia, unspecified type  Trial of seroquel   - QUEtiapine (SEROQUEL) 25 MG tablet  Dispense: 60 tablet; Refill: 0    Hot flashes  Only at night - will evaluate with cbc and tsh  - on depo - very young to consider perimenopausal   - CBC with platelets and  differential  - TSH with free T4 reflex    Anemia, unspecified type  Last hgb 9.9 recheck hgb   - CBC with platelets and differential    Night sweats  Rule out TB, HIV, check CRP , UA , liver function   - Quantiferon-TB Gold Plus  - HIV Antigen Antibody Combo  - CRP, inflammation  - *UA reflex to Microscopic and Culture ([facility] [facility] (except [place] and [place])  - Hepatic panel (Albumin, ALT, AST, Bili, Alk Phos, TP)  - Blood culture  - Basic metabolic panel    CIDP (chronic inflammatory demyelinating polyneuropathy) (H)  Secondary to covid- followed by neurology and pain clinic     Hypokalemia  Potassium was a little low last check -recheck potassium   - Basic metabolic panel    Moderate major depression (H)  On cymbalta- scores actually slightly improved but patient reports very difficult to get anything done at home.  cymbalta dose recently adjusted by pain clinic     Anxiety   as above     Pulmonary embolism with infarction (H)  On xarelto- massive PE with cardiac arrest 5/2019 - unprovoked     Morbid (severe) obesity due to excess calories (H)  Had gastric sleeve    Menorrhagia with irregular cycle  Has seen gyn and endometrial biopsy pending- endometrial stripe was thickened     Cardiac arrest, cause unspecified (H)  Was due to massive PE 5/2019         Depression Screening Follow Up    PHQ 10/16/2020   PHQ-9 Total Score 16   Q9: Thoughts of better off dead/self-harm past 2 weeks Not at all   PHQ-A Total Score -   PHQ-A Mood affect on daily activities -   PHQ-A Suicide Ideation past 2 weeks -     Last PHQ-9 10/16/2020   1.  Little interest or pleasure in doing things 1   2.  Feeling down, depressed, or hopeless 1   3.  Trouble falling or staying asleep, or sleeping too much 3   4.  Feeling tired or having little energy 3   5.  Poor appetite or overeating 2   6.  Feeling bad about yourself 1   7.  Trouble concentrating 2   8.  Moving slowly or restless 3   Q9: Thoughts of better off  "dead/self-harm past 2 weeks 0   PHQ-9 Total Score 16   Difficulty at work, home, or with people Extremely dIfficult       Follow Up Actions Taken  Crisis resource information provided in After Visit Summary         Patient Instructions   Have labs done next week and follow up with me in clinic in 2 weeks if symptoms not improving.   Return urgently if any change in symptoms.    Start seroquel 25 mg at bedtime for 5 days then increase to 50 mg at bedtime if insomnia not improved.     The numbers below are emergency phone numbers in the case of a mental health crisis.      [facility]:   24/7 Suicide Hotline - 1-904.288.9376  Non-emergent Mental Health Hotline - 1-881.692.1416  www.jared.org  Mental Health Crisis for [facility]:  Adults, 18 and older  COPE -- [phone]   Children, ages 17 and younger  Child Crisis -- [phone]    [facility]   Appointments [phone]  After Hours Crisis  [phone]    Mobile Crisis Response Team  [facility] Adult [phone]  [facility] Child [phone]  [facility] [phone]    [facility]  [phone]  [facility] [address] [address]    Crisis Text Line  Text [place] to 206177  Text \"Life to 22677 (12 pm - 3 am)    National Suicide Prevention Lifeline  1-764.560.1657  Veterans' service, option 1            Return in about 2 weeks (around 10/30/2020), or if symptoms worsen or fail to improve, for in person.    Crissy Madrigal PA-C  [facility]    Phone call duration:  23 minutes              "

## 2020-10-16 NOTE — TELEPHONE ENCOUNTER
Telephone visit scheduled today with Crissy Madrigal at 1:20 pm today.  Patient does not have anything to discuss with the nurse.  She denies to be suicidal, followed by psychologist.  Jaclyn Alvarez RN

## 2020-10-17 ASSESSMENT — ANXIETY QUESTIONNAIRES: GAD7 TOTAL SCORE: 6

## 2020-10-17 NOTE — PATIENT INSTRUCTIONS
"Have labs done next week and follow up with me in clinic in 2 weeks if symptoms not improving.   Return urgently if any change in symptoms.    Start seroquel 25 mg at bedtime for 5 days then increase to 50 mg at bedtime if insomnia not improved.     The numbers below are emergency phone numbers in the case of a mental health crisis.      [facility]:   24/7 Suicide Hotline - 1-691.335.6066  Non-emergent Mental Health Hotline - 1-254.917.1291  www.joe.org  Mental Health Crisis for [facility]:  Adults, 18 and older  COPE -- [phone]   Children, ages 17 and younger  Child Crisis -- [phone]    [facility]   Appointments [phone]  After Hours Crisis  [phone]    Mobile Crisis Response Team  [facility] Adult [phone]  [facility] Child [phone]  [facility] [phone]    [facility]  [phone]  [facility] [address] [address]    Crisis Text Line  Text [place] to 875027  Text \"Life to 17595 (12 pm - 3 am)    National Suicide Prevention Lifeline  1-982.869.6685  Veterans' service, option 1        "

## 2020-10-19 LAB — COPATH REPORT: NORMAL

## 2020-10-19 NOTE — PROGRESS NOTES
Skilled Nurse visit in the  patient home to administer Privigen.  No recent elevated temperature, fever, chills, productive cough, coughing for 3 weeks or longer or hemoptysis, abnormal vital signs, night sweats, chest pain. No  decrease in appetite, unexplained weight loss or fatigue.  No other new onset medical symptoms. PIV placed left ac, 1 attempt.  Infusion completed without complication or reaction. Pt reports therapy is effective in managing symptoms related to therapy.    Albino Kim RN  [facility]  [email or web address]

## 2020-10-19 NOTE — PROGRESS NOTES
This is a recent snapshot of the patient's [facility] medical record.  For current drug dose and complete information and questions, call [phone] or In Basket pool, [facility]v home infusion ([id number])  CSN Number:  [id number]

## 2020-10-22 ENCOUNTER — HOSPITAL ENCOUNTER (OUTPATIENT)
Dept: PHYSICAL THERAPY | Facility: CLINIC | Age: 30
Setting detail: THERAPIES SERIES
End: 2020-10-22
Attending: PHYSICIAN ASSISTANT
Payer: COMMERCIAL

## 2020-10-22 ENCOUNTER — HOSPITAL ENCOUNTER (OUTPATIENT)
Dept: OCCUPATIONAL THERAPY | Facility: CLINIC | Age: 30
Setting detail: THERAPIES SERIES
End: 2020-10-22
Attending: PHYSICIAN ASSISTANT
Payer: COMMERCIAL

## 2020-10-22 ENCOUNTER — ANCILLARY PROCEDURE (OUTPATIENT)
Dept: ULTRASOUND IMAGING | Facility: CLINIC | Age: 30
End: 2020-10-22
Attending: PHYSICIAN ASSISTANT
Payer: COMMERCIAL

## 2020-10-22 ENCOUNTER — HOSPITAL ENCOUNTER (EMERGENCY)
Facility: CLINIC | Age: 30
Discharge: HOME | End: 2020-10-22
Attending: EMERGENCY MEDICINE | Admitting: EMERGENCY MEDICINE
Payer: COMMERCIAL

## 2020-10-22 VITALS
RESPIRATION RATE: 18 BRPM | SYSTOLIC BLOOD PRESSURE: 141 MMHG | HEIGHT: 67 IN | HEART RATE: 117 BPM | WEIGHT: 260 LBS | TEMPERATURE: 98.9 F | OXYGEN SATURATION: 93 % | DIASTOLIC BLOOD PRESSURE: 90 MMHG | BODY MASS INDEX: 40.81 KG/M2

## 2020-10-22 DIAGNOSIS — R73.9 ELEVATED RANDOM BLOOD GLUCOSE LEVEL: ICD-10-CM

## 2020-10-22 DIAGNOSIS — E78.1 HYPERTRIGLYCERIDEMIA: ICD-10-CM

## 2020-10-22 DIAGNOSIS — E87.6 HYPOKALEMIA: ICD-10-CM

## 2020-10-22 DIAGNOSIS — G54.9 SENSORY GANGLIONOPATHY: ICD-10-CM

## 2020-10-22 DIAGNOSIS — R23.2 HOT FLASHES: ICD-10-CM

## 2020-10-22 DIAGNOSIS — R61 NIGHT SWEATS: ICD-10-CM

## 2020-10-22 DIAGNOSIS — R80.9 MICROALBUMINURIA: ICD-10-CM

## 2020-10-22 DIAGNOSIS — G54.9 NERVE ROOT DISORDER: ICD-10-CM

## 2020-10-22 DIAGNOSIS — E83.51 HYPOCALCEMIA: ICD-10-CM

## 2020-10-22 DIAGNOSIS — D64.9 ANEMIA, UNSPECIFIED TYPE: ICD-10-CM

## 2020-10-22 LAB
ALBUMIN SERPL-MCNC: 3.1 G/DL (ref 3.4–5)
ALBUMIN UR-MCNC: 10 MG/DL
ALP SERPL-CCNC: 120 U/L (ref 40–150)
ALT SERPL W P-5'-P-CCNC: 25 U/L (ref 0–50)
ANION GAP SERPL CALCULATED.3IONS-SCNC: 10 MMOL/L (ref 3–14)
ANION GAP SERPL CALCULATED.3IONS-SCNC: 6 MMOL/L (ref 3–14)
APPEARANCE UR: ABNORMAL
AST SERPL W P-5'-P-CCNC: 53 U/L (ref 0–45)
BACTERIA #/AREA URNS HPF: ABNORMAL /HPF
BASOPHILS # BLD AUTO: 0.1 10E9/L (ref 0–0.2)
BASOPHILS NFR BLD AUTO: 0.9 %
BILIRUB DIRECT SERPL-MCNC: 0.2 MG/DL (ref 0–0.2)
BILIRUB SERPL-MCNC: 0.5 MG/DL (ref 0.2–1.3)
BILIRUB UR QL STRIP: NEGATIVE
BUN SERPL-MCNC: 6 MG/DL (ref 7–30)
BUN SERPL-MCNC: 6 MG/DL (ref 7–30)
CA-I BLD-MCNC: 4.4 MG/DL (ref 4.4–5.2)
CALCIUM SERPL-MCNC: 8.4 MG/DL (ref 8.5–10.1)
CALCIUM SERPL-MCNC: 9 MG/DL (ref 8.5–10.1)
CHLORIDE SERPL-SCNC: 104 MMOL/L (ref 94–109)
CHLORIDE SERPL-SCNC: 104 MMOL/L (ref 94–109)
CO2 SERPL-SCNC: 26 MMOL/L (ref 20–32)
CO2 SERPL-SCNC: 29 MMOL/L (ref 20–32)
COLOR UR AUTO: YELLOW
CREAT SERPL-MCNC: 0.71 MG/DL (ref 0.52–1.04)
CREAT SERPL-MCNC: 0.75 MG/DL (ref 0.52–1.04)
CRP SERPL-MCNC: 3.6 MG/L (ref 0–8)
DIFFERENTIAL METHOD BLD: ABNORMAL
EOSINOPHIL # BLD AUTO: 0 10E9/L (ref 0–0.7)
EOSINOPHIL NFR BLD AUTO: 0.4 %
ERYTHROCYTE [DISTWIDTH] IN BLOOD BY AUTOMATED COUNT: 15.6 % (ref 10–15)
GFR SERPL CREATININE-BSD FRML MDRD: >90 ML/MIN/{1.73_M2}
GFR SERPL CREATININE-BSD FRML MDRD: >90 ML/MIN/{1.73_M2}
GLUCOSE SERPL-MCNC: 130 MG/DL (ref 70–99)
GLUCOSE SERPL-MCNC: 79 MG/DL (ref 70–99)
GLUCOSE UR STRIP-MCNC: NEGATIVE MG/DL
HCT VFR BLD AUTO: 40 % (ref 35–47)
HGB BLD-MCNC: 13 G/DL (ref 11.7–15.7)
HGB UR QL STRIP: ABNORMAL
IMM GRANULOCYTES # BLD: 0 10E9/L (ref 0–0.4)
IMM GRANULOCYTES NFR BLD: 0.4 %
KETONES UR STRIP-MCNC: NEGATIVE MG/DL
LEUKOCYTE ESTERASE UR QL STRIP: ABNORMAL
LYMPHOCYTES # BLD AUTO: 1.9 10E9/L (ref 0.8–5.3)
LYMPHOCYTES NFR BLD AUTO: 34 %
MCH RBC QN AUTO: 32.7 PG (ref 26.5–33)
MCHC RBC AUTO-ENTMCNC: 32.5 G/DL (ref 31.5–36.5)
MCV RBC AUTO: 101 FL (ref 78–100)
MONOCYTES # BLD AUTO: 0.5 10E9/L (ref 0–1.3)
MONOCYTES NFR BLD AUTO: 9.9 %
MUCOUS THREADS #/AREA URNS LPF: PRESENT /LPF
NEUTROPHILS # BLD AUTO: 3 10E9/L (ref 1.6–8.3)
NEUTROPHILS NFR BLD AUTO: 54.4 %
NITRATE UR QL: NEGATIVE
NON-SQ EPI CELLS #/AREA URNS LPF: ABNORMAL /LPF
PH UR STRIP: 6.5 PH (ref 5–7)
PLATELET # BLD AUTO: 361 10E9/L (ref 150–450)
POTASSIUM SERPL-SCNC: 2.7 MMOL/L (ref 3.4–5.3)
POTASSIUM SERPL-SCNC: 3 MMOL/L (ref 3.4–5.3)
PROT SERPL-MCNC: 8.4 G/DL (ref 6.8–8.8)
PTH-INTACT SERPL-MCNC: 36 PG/ML (ref 18–80)
RBC # BLD AUTO: 3.98 10E12/L (ref 3.8–5.2)
RBC #/AREA URNS AUTO: ABNORMAL /HPF
SODIUM SERPL-SCNC: 139 MMOL/L (ref 133–144)
SODIUM SERPL-SCNC: 140 MMOL/L (ref 133–144)
SOURCE: ABNORMAL
SP GR UR STRIP: 1.01 (ref 1–1.03)
TRIGL SERPL-MCNC: 202 MG/DL
TSH SERPL DL<=0.005 MIU/L-ACNC: 1.71 MU/L (ref 0.4–4)
UROBILINOGEN UR STRIP-MCNC: NORMAL MG/DL (ref 0–2)
WBC # BLD AUTO: 5.5 10E9/L (ref 4–11)
WBC #/AREA URNS AUTO: ABNORMAL /HPF

## 2020-10-22 PROCEDURE — 36415 COLL VENOUS BLD VENIPUNCTURE: CPT | Performed by: PSYCHIATRY & NEUROLOGY

## 2020-10-22 PROCEDURE — 96368 THER/DIAG CONCURRENT INF: CPT | Performed by: EMERGENCY MEDICINE

## 2020-10-22 PROCEDURE — 93005 ELECTROCARDIOGRAM TRACING: CPT | Performed by: EMERGENCY MEDICINE

## 2020-10-22 PROCEDURE — 99284 EMERGENCY DEPT VISIT MOD MDM: CPT | Mod: 25 | Performed by: EMERGENCY MEDICINE

## 2020-10-22 PROCEDURE — 86334 IMMUNOFIX E-PHORESIS SERUM: CPT | Performed by: PATHOLOGY

## 2020-10-22 PROCEDURE — 96366 THER/PROPH/DIAG IV INF ADDON: CPT | Performed by: EMERGENCY MEDICINE

## 2020-10-22 PROCEDURE — 80048 BASIC METABOLIC PNL TOTAL CA: CPT | Performed by: EMERGENCY MEDICINE

## 2020-10-22 PROCEDURE — 87040 BLOOD CULTURE FOR BACTERIA: CPT | Performed by: PHYSICIAN ASSISTANT

## 2020-10-22 PROCEDURE — 97530 THERAPEUTIC ACTIVITIES: CPT | Mod: GO | Performed by: OCCUPATIONAL THERAPIST

## 2020-10-22 PROCEDURE — 93010 ELECTROCARDIOGRAM REPORT: CPT | Performed by: EMERGENCY MEDICINE

## 2020-10-22 PROCEDURE — 82784 ASSAY IGA/IGD/IGG/IGM EACH: CPT | Performed by: PSYCHIATRY & NEUROLOGY

## 2020-10-22 PROCEDURE — 84478 ASSAY OF TRIGLYCERIDES: CPT | Performed by: PSYCHIATRY & NEUROLOGY

## 2020-10-22 PROCEDURE — 84443 ASSAY THYROID STIM HORMONE: CPT | Performed by: PSYCHIATRY & NEUROLOGY

## 2020-10-22 PROCEDURE — 96361 HYDRATE IV INFUSION ADD-ON: CPT | Performed by: EMERGENCY MEDICINE

## 2020-10-22 PROCEDURE — 76770 US EXAM ABDO BACK WALL COMP: CPT | Performed by: STUDENT IN AN ORGANIZED HEALTH CARE EDUCATION/TRAINING PROGRAM

## 2020-10-22 PROCEDURE — 82306 VITAMIN D 25 HYDROXY: CPT | Performed by: PSYCHIATRY & NEUROLOGY

## 2020-10-22 PROCEDURE — 250N000011 HC RX IP 250 OP 636: Performed by: EMERGENCY MEDICINE

## 2020-10-22 PROCEDURE — 250N000013 HC RX MED GY IP 250 OP 250 PS 637: Performed by: EMERGENCY MEDICINE

## 2020-10-22 PROCEDURE — 97110 THERAPEUTIC EXERCISES: CPT | Mod: GP | Performed by: PHYSICAL THERAPIST

## 2020-10-22 PROCEDURE — 80048 BASIC METABOLIC PNL TOTAL CA: CPT | Performed by: PSYCHIATRY & NEUROLOGY

## 2020-10-22 PROCEDURE — 258N000003 HC RX IP 258 OP 636: Performed by: EMERGENCY MEDICINE

## 2020-10-22 PROCEDURE — 81001 URINALYSIS AUTO W/SCOPE: CPT | Performed by: PHYSICIAN ASSISTANT

## 2020-10-22 PROCEDURE — 99000 SPECIMEN HANDLING OFFICE-LAB: CPT | Performed by: PSYCHIATRY & NEUROLOGY

## 2020-10-22 PROCEDURE — 86140 C-REACTIVE PROTEIN: CPT | Performed by: PSYCHIATRY & NEUROLOGY

## 2020-10-22 PROCEDURE — 82330 ASSAY OF CALCIUM: CPT | Performed by: PHYSICIAN ASSISTANT

## 2020-10-22 PROCEDURE — 96365 THER/PROPH/DIAG IV INF INIT: CPT | Performed by: EMERGENCY MEDICINE

## 2020-10-22 PROCEDURE — 86481 TB AG RESPONSE T-CELL SUSP: CPT | Performed by: PSYCHIATRY & NEUROLOGY

## 2020-10-22 PROCEDURE — 87389 HIV-1 AG W/HIV-1&-2 AB AG IA: CPT | Performed by: PSYCHIATRY & NEUROLOGY

## 2020-10-22 PROCEDURE — 83970 ASSAY OF PARATHORMONE: CPT | Performed by: PSYCHIATRY & NEUROLOGY

## 2020-10-22 PROCEDURE — 85025 COMPLETE CBC W/AUTO DIFF WBC: CPT | Performed by: PSYCHIATRY & NEUROLOGY

## 2020-10-22 PROCEDURE — 86256 FLUORESCENT ANTIBODY TITER: CPT | Mod: 90 | Performed by: PSYCHIATRY & NEUROLOGY

## 2020-10-22 PROCEDURE — 80076 HEPATIC FUNCTION PANEL: CPT | Performed by: PSYCHIATRY & NEUROLOGY

## 2020-10-22 RX ORDER — POTASSIUM CL/LIDO/0.9 % NACL 10MEQ/0.1L
10 INTRAVENOUS SOLUTION, PIGGYBACK (ML) INTRAVENOUS ONCE
Status: COMPLETED | OUTPATIENT
Start: 2020-10-22 | End: 2020-10-22

## 2020-10-22 RX ORDER — POTASSIUM CL/LIDO/0.9 % NACL 10MEQ/0.1L
10 INTRAVENOUS SOLUTION, PIGGYBACK (ML) INTRAVENOUS ONCE
Status: DISCONTINUED | OUTPATIENT
Start: 2020-10-22 | End: 2020-10-22

## 2020-10-22 RX ORDER — SODIUM CHLORIDE 9 MG/ML
INJECTION, SOLUTION INTRAVENOUS CONTINUOUS
Status: DISCONTINUED | OUTPATIENT
Start: 2020-10-22 | End: 2020-10-23 | Stop reason: HOSPADM

## 2020-10-22 RX ORDER — POTASSIUM CHLORIDE 20MEQ/15ML
40 LIQUID (ML) ORAL ONCE
Status: DISCONTINUED | OUTPATIENT
Start: 2020-10-22 | End: 2020-10-23 | Stop reason: HOSPADM

## 2020-10-22 RX ORDER — MAGNESIUM SULFATE HEPTAHYDRATE 40 MG/ML
2 INJECTION, SOLUTION INTRAVENOUS ONCE
Status: COMPLETED | OUTPATIENT
Start: 2020-10-22 | End: 2020-10-22

## 2020-10-22 RX ORDER — ACETAMINOPHEN 500 MG
1000 TABLET ORAL ONCE
Status: COMPLETED | OUTPATIENT
Start: 2020-10-22 | End: 2020-10-22

## 2020-10-22 RX ORDER — POTASSIUM CHLORIDE 20MEQ/15ML
40 LIQUID (ML) ORAL ONCE
Status: DISCONTINUED | OUTPATIENT
Start: 2020-10-22 | End: 2020-10-22

## 2020-10-22 RX ADMIN — SODIUM CHLORIDE: 9 INJECTION, SOLUTION INTRAVENOUS at 21:03

## 2020-10-22 RX ADMIN — SODIUM CHLORIDE 1000 ML: 9 INJECTION, SOLUTION INTRAVENOUS at 19:48

## 2020-10-22 RX ADMIN — Medication 10 MEQ: at 19:55

## 2020-10-22 RX ADMIN — POTASSIUM CHLORIDE 40 MEQ: 20 SOLUTION ORAL at 18:18

## 2020-10-22 RX ADMIN — ACETAMINOPHEN 1000 MG: 500 TABLET, FILM COATED ORAL at 19:47

## 2020-10-22 RX ADMIN — Medication 10 MEQ: at 18:16

## 2020-10-22 RX ADMIN — MAGNESIUM SULFATE IN WATER 2 G: 40 INJECTION, SOLUTION INTRAVENOUS at 18:39

## 2020-10-22 ASSESSMENT — ENCOUNTER SYMPTOMS
FEVER: 0
CONFUSION: 0
COLOR CHANGE: 0
DIFFICULTY URINATING: 0
EYE REDNESS: 0
ABDOMINAL PAIN: 0
HEADACHES: 1
ARTHRALGIAS: 0
SHORTNESS OF BREATH: 0
NECK STIFFNESS: 0

## 2020-10-22 ASSESSMENT — MIFFLIN-ST. JEOR: SCORE: 1936.98

## 2020-10-22 NOTE — RESULT ENCOUNTER NOTE
Aleksey Manrique  Your calcium level was normal.   Your thyroid function was normal.   Your potassium is low.  You need to go to the emergency department for further evaluation as we discussed on the phone.   Your triglycerides are a bit high.   Please call or MyChart my office with any questions or concerns.    Crissy Madrigal, PAC

## 2020-10-22 NOTE — ED AVS SNAPSHOT
[facility] Emergency Department  [address] [address]  Phone: [phone]                                    Hilaria Bonner   MRN: [id number]    Department: [facility] Emergency Department   Date of Visit: 10/22/2020           After Visit Summary Signature Page    I have received my discharge instructions, and my questions have been answered. I have discussed any challenges I see with this plan with the nurse or doctor.    ..........................................................................................................................................  Patient/Patient Representative Signature      ..........................................................................................................................................  Patient Representative Print Name and Relationship to Patient    ..................................................               ................................................  Date                                   Time    ..........................................................................................................................................  Reviewed by Signature/Title    ...................................................              ..............................................  Date                                               Time          22EPIC Rev 08/18

## 2020-10-22 NOTE — RESULT ENCOUNTER NOTE
Aleksey Manrique  Your kidney ultrasound was normal.   Please call or MyChart my office with any questions or concerns.    Crissy Madrigal, PAC

## 2020-10-22 NOTE — ED PROVIDER NOTES
[place] EMERGENCY DEPARTMENT ([facility])  October 22, 2020   ED 15    History     Chief Complaint   Patient presents with     Abnormal Labs     The history is provided by the patient and medical records.     Hilaria Bonner is a 29 year old female with history of PE with cardiac arrest 5/2019 on Xarelto, chronic inflammatory demyelinating polyneuropathy, gastric sleeve surgery, COVID-19 infection in April of this year who presents for further evaluation of abnormal lab values.  Patient outpatient labs today that showed low potassium of 2.7.  She also had ultrasound of the kidneys due to proteinuria that were normal, normal thyroid, calcium.  She was instructed to go to the ED for further evaluation.    Here in the ED, patient reports she was taking 20 mg potassium BID since last June. She states earlier this month she stopped taking potassium because she was only given 2 tablets and told to stop it after that. Patient reports she has a mild bilateral, frontal headache. She states she got this today but has had headaches on and off the past couple of weeks. She reports she did have some double vision and was seen for this. She states she recently had medications changes of an increase of Topamax and Cymbalta and addition of Seroquel. She states the Seroquel was added due to not being able to sleep due to her depression. Patient denies abdominal pain, vomiting, diarrhea, fever, or recent illness.     PAST MEDICAL HISTORY:   Past Medical History:   Diagnosis Date     Acute kidney injury (H) 05/13/2019     Cardiac arrest (H) 05/13/2019     Earache or other ear, nose, or throat complaint 12/15    tyroid     Pulmonary embolus (H) 05/13/2019       PAST SURGICAL HISTORY:   Past Surgical History:   Procedure Laterality Date     GYN SURGERY      2 C-sections     KNEE SURGERY  most recently - [id number]    3 surgeries in [place]     LAPAROSCOPIC GASTRIC SLEEVE N/A 3/26/2019    Procedure: Laparoscopic Sleeve  Gastrectomy;  Surgeon: Luan Lopez MD;  Location: [facility] OR     ORTHOPEDIC SURGERY      2 knee meniscus surgery       Past medical history, past surgical history, medications, and allergies were reviewed with the patient. Additional pertinent items: None    FAMILY HISTORY:   Family History   Problem Relation Age of Onset     Diabetes Father      Hypertension Father      Breast Cancer Sister 26        surgery only     Cancer Sister      Coronary Artery Disease Other         paternal aunt     Thyroid Disease Other         neice     Coronary Artery Disease Other      Cerebrovascular Disease Other      Breast Cancer Sister      Thyroid Disease Other      Cerebrovascular Disease No family hx of        SOCIAL HISTORY:   Social History     Tobacco Use     Smoking status: Heavy Tobacco Smoker     Packs/day: 0.30     Years: 1.00     Pack years: 0.30     Types: Cigarettes     Start date: 2016     Last attempt to quit: 2018     Years since quittin.7     Smokeless tobacco: Never Used   Substance Use Topics     Alcohol use: Not Currently     Alcohol/week: 0.0 standard drinks     Comment: occasional     Social history was reviewed with the patient. Additional pertinent items: None      Discharge Medication List as of 10/22/2020 10:29 PM      CONTINUE these medications which have NOT CHANGED    Details   rivaroxaban ANTICOAGULANT (XARELTO ANTICOAGULANT) 20 MG TABS tablet Take 1 tablet (20 mg) by mouth daily (with dinner), Disp-30 tablet,R-0, E-Prescribe      !! cholecalciferol 50 MCG ( UT) tablet Take 1 tablet by mouth daily, Disp-90 tablet,R-3, E-Prescribe      diclofenac (VOLTAREN) 1 % topical gel Place 2 g onto the skin 4 times daily, Disp-100 g, R-1, E-Prescribe      diphenhydrAMINE (BENADRYL) 50 MG/ML injection Historical      DULoxetine (CYMBALTA) 60 MG capsule Take 1 capsule (60 mg) by mouth 2 times daily, Disp-60 capsule, R-1, E-Prescribe      EPINEPHrine (ANY BX GENERIC EQUIV) 0.3 MG/0.3ML  injection 2-pack Historical      folic acid (FOLVITE) 1 MG tablet Take 1 tablet (1 mg) by mouth daily, Disp-30 tablet,R-11, E-Prescribe      hydrOXYzine (ATARAX) 25 MG tablet Take 1-2 tablets (25-50 mg) by mouth every 6 hours as needed for anxiety or other (adjuvant pain), Disp-90 tablet, R-1, E-Prescribe      lisinopril (ZESTRIL) 10 MG tablet Take 1 tablet (10 mg) by mouth daily, Disp-90 tablet,R-1, E-Prescribe      melatonin 1 MG TABS tablet Take 1 tablet (1 mg) by mouth nightly as needed for sleep, OTC      Multiple Vitamins-Minerals (MULTIVITAMIN ADULTS) TABS Take 1 tablet by mouth daily, OTC      phenylephrine-shark liver oil-mineral oil-petrolatum (PREPARATION H) 0.25-14-74.9 % rectal ointment Place rectally 2 times daily as needed for hemorrhoidsTransitional      polyethylene glycol (MIRALAX) 17 GM/SCOOP powder Take 17 g (1 capful) by mouth daily, Disp-850 g,R-3, OTC      Pregabalin (LYRICA) 200 MG capsule Take 1 capsule (200 mg) by mouth 3 times daily, Disp-90 capsule,R-3, E-Prescribe      PRIVIGEN 40 GM/400ML SOLN DEONTE, Historical      QUEtiapine (SEROQUEL) 25 MG tablet 25 mg at bedtime for 5 days then increase 50 mg at bedtime if still having problems with insomnia, Disp-60 tablet, R-0, E-Prescribe      SOLU-CORTEF 100 MG injection DEONTE, Historical      topiramate (TOPAMAX) 50 MG tablet Take 2 tablets (100 mg) by mouth 2 times daily, Disp-60 tablet, R-1, E-Prescribe      vitamin (B COMPLEX) tablet Take 1 tablet by mouth daily, Disp-90 tablet,R-3, E-Prescribe      vitamin C (ASCORBIC ACID) 1000 MG TABS Take 1 tablet (1,000 mg) by mouth daily, Disp-30 tablet,R-0, E-Prescribe      !! vitamin D3 (CHOLECALCIFEROL) 2000 units (50 mcg) tablet Take 1 tablet (2,000 Units) by mouth daily, Disp-30 tablet,R-0, E-Prescribe       !! - Potential duplicate medications found. Please discuss with provider.           No Known Allergies     Review of Systems   Constitutional: Negative for fever.   HENT: Negative for congestion.  "   Eyes: Negative for redness.   Respiratory: Negative for shortness of breath.    Cardiovascular: Negative for chest pain.   Gastrointestinal: Negative for abdominal pain.   Genitourinary: Negative for difficulty urinating.   Musculoskeletal: Negative for arthralgias and neck stiffness.   Skin: Negative for color change.   Neurological: Positive for headaches.   Psychiatric/Behavioral: Negative for confusion.   All other systems reviewed and are negative.    A complete review of systems was performed with pertinent positives and negatives noted in the HPI, and all other systems negative.    Physical Exam   BP: (!) 154/104  Pulse: 112  Temp: 98.9  F (37.2  C)  Resp: 16  Height: 170.2 cm (5' 7\")  Weight: 117.9 kg (260 lb)  SpO2: 94 %      Physical Exam  Vitals signs and nursing note reviewed.   Constitutional:       General: She is not in acute distress.  HENT:      Head: Atraumatic.      Mouth/Throat:      Mouth: Mucous membranes are moist.   Eyes:      General: No scleral icterus.     Extraocular Movements: Extraocular movements intact.   Neck:      Musculoskeletal: Neck supple.   Cardiovascular:      Rate and Rhythm: Regular rhythm. Tachycardia present.      Heart sounds: Normal heart sounds.   Pulmonary:      Effort: Pulmonary effort is normal. No respiratory distress.   Chest:      Chest wall: No tenderness.   Abdominal:      Palpations: Abdomen is soft.      Tenderness: There is no abdominal tenderness. There is no guarding or rebound.   Musculoskeletal:      Right lower leg: No edema.      Left lower leg: No edema.   Skin:     General: Skin is warm.      Coloration: Skin is not pale.   Neurological:      General: No focal deficit present.      Mental Status: She is alert and oriented to person, place, and time.         ED Course   5:40 PM  The patient was seen and examined by Brian Fuentes MD in Room ED15.      Procedures             EKG Interpretation:      Interpreted by Brian Fuentes MD  Time " reviewed: 1730  Symptoms at time of EKG: Low potassium  Rhythm: sinus tachycardia  Rate: 103  Axis: normal  Ectopy: none  Conduction: normal  ST Segments/ T Waves: No ST-T wave changes  Q Waves: none  Comparison to prior: Unchanged    Clinical Impression: normal EKG                        Results for orders placed or performed during the hospital encounter of 10/22/20 (from the past 24 hour(s))   Basic metabolic panel   Result Value Ref Range    Sodium 140 133 - 144 mmol/L    Potassium 3.0 (L) 3.4 - 5.3 mmol/L    Chloride 104 94 - 109 mmol/L    Carbon Dioxide 26 20 - 32 mmol/L    Anion Gap 10 3 - 14 mmol/L    Glucose 79 70 - 99 mg/dL    Urea Nitrogen 6 (L) 7 - 30 mg/dL    Creatinine 0.71 0.52 - 1.04 mg/dL    GFR Estimate >90 >60 mL/min/[1.73_m2]    GFR Estimate If Black >90 >60 mL/min/[1.73_m2]    Calcium 9.0 8.5 - 10.1 mg/dL   EKG 12 lead   Result Value Ref Range    Interpretation ECG Click View Image link to view waveform and result      Medications   potassium chloride 10 mEq in 100 mL intermittent infusion with 10 mg lidocaine (0 mEq Intravenous Stopped 10/22/20 1925)   magnesium sulfate 2 g in water intermittent infusion (0 g Intravenous Stopped 10/22/20 1937)   0.9% sodium chloride BOLUS (0 mLs Intravenous Stopped 10/22/20 2103)   acetaminophen (TYLENOL) tablet 1,000 mg (1,000 mg Oral Given 10/22/20 1947)   potassium chloride 10 mEq in 100 mL intermittent infusion with 10 mg lidocaine (0 mEq Intravenous Stopped 10/22/20 2103)             Assessments & Plan (with Medical Decision Making)   The patient has a low potassium which was actually 3.0 on recheck in the ED.  She was sent in for management and was given 20 mEq IV over 2 hours as well as 2 g of magnesium to help her hold onto the potassium.  She was given 40 mEq of potassium orally with some food.  The patient developed mild sinus tachycardia fluctuating between 120 and 130 without any symptoms.  She states she always has a fast heart rate.  She denies  any new chest pain, palpitations or shortness of breath.  With a liter of IV fluid she felt completely back to normal and her heart rate went down to 101.  She is taking her Xarelto and has no new PE or DVT symptoms.  The patient seems to have misunderstood her instructions and stopped her potassium recently.  She has an adequate supply at home and will take 20 mEq twice a day and follow-up next week for recheck potassium.  She had been instructed to follow-up with nephrology but when she last saw them a year ago they said they do not need to see her again.  She will follow-up with her primary doctor for further discussion.    I have reviewed the nursing notes.    I have reviewed the findings, diagnosis, plan and need for follow up with the patient.    Discharge Medication List as of 10/22/2020 10:29 PM          Final diagnoses:   Hypokalemia     IGaye, am serving as a trained medical scribe to document services personally performed by Brian Fuentes MD, based on the provider's statements to me.      IBrian MD, was physically present and have reviewed and verified the accuracy of this note documented by Gaye Arias.     10/22/2020   [facility] EMERGENCY DEPARTMENT     Brian Fuentes MD  10/23/20 [id number]

## 2020-10-22 NOTE — RESULT ENCOUNTER NOTE
Aleksey Manrique  Your urine does not look infected.   Your blood counts are normal.  I will notify you of the rest of your labs as they become available.   Please call or MyChart my office with any questions or concerns.    Crissy Madrigal, PAC

## 2020-10-23 ENCOUNTER — PATIENT OUTREACH (OUTPATIENT)
Dept: NURSING | Facility: CLINIC | Age: 30
End: 2020-10-23
Payer: COMMERCIAL

## 2020-10-23 ENCOUNTER — TELEPHONE (OUTPATIENT)
Dept: FAMILY MEDICINE | Facility: CLINIC | Age: 30
End: 2020-10-23

## 2020-10-23 LAB
DEPRECATED CALCIDIOL+CALCIFEROL SERPL-MC: 41 UG/L (ref 20–75)
HIV 1+2 AB+HIV1 P24 AG SERPL QL IA: NONREACTIVE
IGA SERPL-MCNC: 167 MG/DL (ref 84–499)
IGG SERPL-MCNC: 2593 MG/DL (ref 610–1616)
IGM SERPL-MCNC: 95 MG/DL (ref 35–242)
INTERPRETATION ECG - MUSE: NORMAL
PROT PATTERN SERPL IFE-IMP: ABNORMAL

## 2020-10-23 RX ORDER — DULOXETIN HYDROCHLORIDE 30 MG/1
CAPSULE, DELAYED RELEASE ORAL
COMMUNITY
Start: 2020-10-19 | End: 2021-03-22

## 2020-10-23 RX ORDER — HUMAN IMMUNOGLOBULIN G 20 G/200ML
20 LIQUID INTRAVENOUS
COMMUNITY
Start: 2020-10-15 | End: 2023-05-12

## 2020-10-23 RX ORDER — TOPIRAMATE 25 MG/1
TABLET, FILM COATED ORAL
COMMUNITY
Start: 2020-10-19 | End: 2020-11-19

## 2020-10-23 NOTE — TELEPHONE ENCOUNTER
Reason for Call:  Other Referral    Detailed comments: Pt calling saying she received a phone call from  [facility] about a referral but was unclear as to who she was being referred to on the voicemail message that was left to her and therefore would like a call back for more clarification.      Phone Number Patient can be reached at: Home number on file [phone] (home)    Best Time: anytime    Can we leave a detailed message on this number? YES    Call taken on 10/23/2020 at 9:13 AM by Reno Adrian

## 2020-10-23 NOTE — TELEPHONE ENCOUNTER
Follow up with nephrology at [facility] ([phone]) formerly called [facility] due to protein in urine and history of acute kidney injury  Avoid ibuprofen, aleve, and other nonsteroidal antiinflammatory medications

## 2020-10-23 NOTE — DISCHARGE INSTRUCTIONS
Please make an appointment to follow up with Your Primary Care Provider in 7 days for recheck.    Take your potassium twice a day 20 mEq  Return if you feel worse or light headed.

## 2020-10-23 NOTE — PROGRESS NOTES
Clinic Care Coordination Contact    Follow Up Progress Note      Assessment: Outreach to patient.     Patient was seen in [facility] ED on 10/22/20 due to hypokalemia. Patient had stopped taking potassium    Patient states she is now taking 20 meq of potassium twice a day. She states she only has 10 meq tabs at home so is taking two at a time. She would prefer the 20 meq tabs. Advised to finish her 10 meq tabs and then new Rx can be called in.     Patient states her headache is better.   She continues to struggle with covid-19 after effects.  Still has pain and burning in her hands and feet. She has had IViG infusions.   She is following closely with neuro.    Patient has an appointment schedule for acupuncture on 10/27/20 in [facility].    Patient has been instructed by PCP to follow up with nephrology in [place].     Patient has been trying very hard to keep up with all of her appointments and has been doing a better job of staying compliant.     Patient states she has filed for social security disability.   She is working with worker's comp. She states they want her to see one of their physicians, this is scheduled for November 5th. She states she has sent them her records. 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       Goals addressed this encounter: slow to minimal improvement  Goals Addressed                 This Visit's Progress      Improve chronic symptoms (pt-stated)   20%     Goal Statement: I want the burning pain in my hands to improve    Date Goal set: 6/12/20    Barriers: Polyneuropathy    Strengths: Family support    Date to Achieve By: December 2020    Patient expressed understanding of goal: yes    Action steps to achieve this goal:  1. I will continue Lyrica TID  2. I will use Tramadol as instructed for pain  3. I will participate in outpatient therapies         Intervention/Education provided  during outreach: Discussed potassium dose. Schedule appointment with nephrology.        Outreach Frequency: monthly    Plan:   Patient will attend scheduled appointments.     Care Coordinator will follow up in one month.    Monica Hernandez RN, [facility] RN Coordinator  [facility]   10/23/2020    11:41 AM  [phone]

## 2020-10-23 NOTE — LETTER
[facility]  Complex Care Plan  About Me:    Patient Name:  Hilaria Bonner    YOB: 1990  Age:         29 year old   [place] MRN:    [id number] Telephone Information:  Home Phone [phone]   Mobile [phone]       Address:  [address]  [address]  [place] [address] Email address:  [email or web address]      Emergency Contact(s)    Name Relationship Lgl Grd Work Phone Home Phone Mobile Phone   1. MANJULA BONNER Mother   [phone]           Primary language:  English     needed? No   [place] [facility]:  [phone] op. 1  Other communication barriers: None  Preferred Method of Communication:  Ferdinand  Current living arrangement:  Apartment with family  Mobility Status/ Medical Equipment:  none    Health Maintenance  Health Maintenance Reviewed:      My Access Plan  Medical Emergency 911   Primary Clinic Line [facility] - [phone]   24 Hour Appointment Line [phone] or  [phone]) (toll-free)   24 Hour Nurse Line [phone] (toll-free)   Preferred Urgent Care     Preferred Hospital     Preferred Pharmacy [id number] - [place], [place] - [address] AT [address]     Behavioral Health Crisis Line The National Suicide Prevention Lifeline at 1-291.513.6623 or 911             My Care Team Members  Patient Care Team       Relationship Specialty Notifications Start End    Crissy Madrigal PA-C PCP - General Family Practice  10/10/19     Phone: [phone] Fax: [phone] [address] [place] [address] [address] [facility] [address]    Joanne Sterling PA-C Physician Assistant Physician Assistant  1/16/18     Phone: [phone] Fax: [phone]         [address] [place] [address] [address] [facility] [address]    Alison Sevilla PA-C Physician Assistant Physician Assistant  1/16/18     Phone: [phone] Fax: [phone]         [address] [facility] [address]    Crissy Madrigal PA-C Assigned PCP   6/9/19     Phone: [phone] Fax: [phone] [address] [facility] [address]    Monica Hernandez, RN Lead Care Coordinator Primary Care - [facility] Admissions 6/12/20     Phone: [phone]         Mmoo Sher MD MD Neurology  7/27/20     Phone: [phone] Fax: [phone]         [address] [facility] [address] [facility] [address]    Travon Chua MD MD Neurology  7/27/20     Phone: [phone] Fax: [phone]         [address] [place] [address]            My Care Plans  Self Management and Treatment Plan  Goals and (Comments)  Goals        General    Improve chronic symptoms (pt-stated)     Notes - Note edited  9/25/2020 12:42 PM by Monica Hernandez RN    Goal Statement: I want the burning pain in my hands to improve    Date Goal set: 6/12/20    Barriers: Polyneuropathy    Strengths: Family support    Date to Achieve By: December 2020    Patient expressed understanding of goal: yes    Action steps to achieve this goal:  1. I will continue Lyrica TID  2. I will use Tramadol as instructed for pain  3. I will participate in outpatient therapies              Action Plans on File:                       Advance Care Plans/Directives Type:        My Medical and Care Information  Problem List   Patient Active Problem List   Diagnosis     Morbid obesity (H)     Vitamin D deficiency     Elevated parathyroid hormone     Encounter for smoking cessation counseling     Menorrhagia with irregular cycle     Morbid (severe) obesity due to excess calories (H)     Pulmonary embolism with infarction (H)     Cardiac arrest, cause unspecified (H)     VT (ventricular tachycardia) (H)     Other pulmonary embolism with acute cor pulmonale, unspecified chronicity (H)     Secondary hyperparathyroidism, not elsewhere classified (H)     Paresthesias     Hemorrhoids, unspecified hemorrhoid type     Anxiety     Polyneuropathy     Altered mental  status     CIDP (chronic inflammatory demyelinating polyneuropathy) (H)      Current Medications and Allergies:  See printed Medication Report.    Care Coordination Start Date: 6/12/2020   Frequency of Care Coordination: monthly   Form Last Updated: 10/23/2020

## 2020-10-24 DIAGNOSIS — R61 NIGHT SWEATS: Primary | ICD-10-CM

## 2020-10-24 NOTE — RESULT ENCOUNTER NOTE
Aleksey Manrique  I'm glad that you did not have to stay in the hospital overnight.    I recommend that you schedule CT chest, abdomen and pelvis at [facility] ([phone]) formerly called [facility] to further evaluate the night sweats.   Please call or MyChart my office with any questions or concerns.    Crissy Madrigal, PAC

## 2020-10-26 LAB
GAMMA INTERFERON BACKGROUND BLD IA-ACNC: 0.09 IU/ML
M TB IFN-G CD4+ BCKGRND COR BLD-ACNC: 1.23 IU/ML
M TB TUBERC IFN-G BLD QL: NEGATIVE
MITOGEN IGNF BCKGRD COR BLD-ACNC: 0 IU/ML
MITOGEN IGNF BCKGRD COR BLD-ACNC: 0.02 IU/ML

## 2020-10-26 NOTE — RESULT ENCOUNTER NOTE
Aleksey Manrique  Your TB test was negative.   Please call or MyChart my office with any questions or concerns.    Crissy Madrigal, PAC

## 2020-10-27 ENCOUNTER — THERAPY VISIT (OUTPATIENT)
Dept: CHIROPRACTIC MEDICINE | Facility: CLINIC | Age: 30
End: 2020-10-27
Payer: COMMERCIAL

## 2020-10-27 DIAGNOSIS — G61.81 CHRONIC INFLAMMATORY DEMYELINATING POLYNEUROPATHY (H): ICD-10-CM

## 2020-10-27 DIAGNOSIS — R20.2 NUMBNESS AND TINGLING IN BOTH HANDS: ICD-10-CM

## 2020-10-27 DIAGNOSIS — R20.0 NUMBNESS AND TINGLING IN BOTH HANDS: ICD-10-CM

## 2020-10-27 DIAGNOSIS — R20.0 NUMBNESS AND TINGLING OF BOTH FEET: ICD-10-CM

## 2020-10-27 DIAGNOSIS — R20.2 NUMBNESS AND TINGLING OF BOTH FEET: ICD-10-CM

## 2020-10-27 DIAGNOSIS — M99.07 SOMATIC DYSFUNCTION OF UPPER EXTREMITIES: ICD-10-CM

## 2020-10-27 DIAGNOSIS — M99.06 SOMATIC DYSFUNCTION OF LOWER EXTREMITIES: Primary | ICD-10-CM

## 2020-10-27 PROCEDURE — 97810 ACUP 1/> WO ESTIM 1ST 15 MIN: CPT | Performed by: CHIROPRACTOR

## 2020-10-27 PROCEDURE — 99203 OFFICE O/P NEW LOW 30 MIN: CPT | Mod: 25 | Performed by: CHIROPRACTOR

## 2020-10-27 NOTE — PROGRESS NOTES
Chiropractic Clinic Visit    PCP: Crissy Madrigal    Hilarai Bonner is a 29 year old female who is seen  in consultation at the request of  Karie DIEZ presenting with bilateral hand and foot numbness, tingling and pain . Patient reports that the onset was on 2020 after recovering from Covid 19. When asked, patient denies:, falling, slipping, bending and reaching or sleeping awkwardly. Lita reports that she has been having some numbness tingling and pain in her hand and feet since recovering from Covid 19.  She has been seen by neurologist and pain management who have said that this was an after effect of covid.  Lita symptoms are constant. The only thing that seems to influence the severity it the weather, being hot or cold.  She cannot feel anything from her elbows to her hands and her hand are tender.  She also cannot feel anything in her feet but pain.  She often stumbles as she cannot feel the ground unless she is looking at it.  She does mention that her calves are stiff and sore.    Injury: aris    Location of Pain: bilateral hand and feet   Duration of Pain: 14 weeks   Rating of Pain at worst: 10/10  Rating of Pain Currently: 10/10  Symptoms are better with:   Symptoms are worse with: hot or cold weather  Additional Features:        Health History  as reported by the patient:    How does the patient rate their own health:   Fair    Current or past medical history:   Calf pain, swelling or warmth, Chest pain, Depression, Heart problems, High blood pressure, Migraines/headaches, Numbness/tingling, Overweight and Pain at night/rest    Medical allergies  None    Past Traumas/Surgeries  Orthopedic: knee x3 and Other:   x2    Family History  The family history includes Breast Cancer in her sister; Breast Cancer (age of onset: 26) in her sister; Cancer in her sister; Cerebrovascular Disease in an other family member; Coronary Artery Disease in some other family members;  Diabetes in her father; Hypertension in her father; Thyroid Disease in some other family members.    Medications:  Anti-depressants, High blood pressure, Pain and Sleep    Occupation:  Nursing assistant    Primary job tasks:   Computer work, Driving, Lifting/carrying, Prolonged standing, Pushing/pulling and Repetitive tasks    Barriers as home/work:   none    Additional health Issues:           Review of Systems  Musculoskeletal: as above  Remainder of review of systems is negative including constitutional, CV, pulmonary, GI, Skin and Neurologic except as noted in HPI or medical history.    Past Medical History:   Diagnosis Date     Acute kidney injury (H) 05/13/2019     Cardiac arrest (H) 05/13/2019     Earache or other ear, nose, or throat complaint 12/15    tyroid     Pulmonary embolus (H) 05/13/2019     Past Surgical History:   Procedure Laterality Date     GYN SURGERY      2 C-sections     KNEE SURGERY  most recently - [id number]    3 surgeries in [place]     LAPAROSCOPIC GASTRIC SLEEVE N/A 3/26/2019    Procedure: Laparoscopic Sleeve Gastrectomy;  Surgeon: Luan Lopez MD;  Location: [facility] OR     ORTHOPEDIC SURGERY      2 knee meniscus surgery       [id number]  There were no vitals taken for this visit.    GENERAL APPEARANCE: healthy, alert and mild distress   GAIT: NORMAL  SKIN: no suspicious lesions or rashes  NEURO: Normal strength and tone, mentation intact and speech normal  PSYCH:  mentation appears normal and affect normal/bright    Foot exam:  Abnormal sensation bilaterally,   Pulse present  ligaments intact,   limited range of motion with eversion on R     Segmental dysfunction/restrictions found at:  Talus on left    Hand exam:  Abnormal sensation bilaterally - greatly reduced  Ligaments intact  Limitation ROM with hand extension/flexion  Segmental dysfunction/restriction found at:  Scaphoid       Segmental spinal dysfunction/restrictions found at::  Extra-spinal L  talus.  Scaphoid      Lymphatics:        no edema noted in the extremities       The following soft tissue hypotonicities were observed:   gastroc: bilateral, ache, dull pain and stiff, referred pain: no    Trigger points were found in:  gastroc:     Muscle spasm found in:  gastroc:       Radiology:      Assessment:    1. Somatic dysfunction of lower extremities    2. Numbness and tingling of both feet    3. Somatic dysfunction of upper extremities    4. Numbness and tingling in both hands    5. Chronic inflammatory demyelinating polyneuropathy (H)        RX ordered/plan of care  Anticipated outcomes  Possible risks and side effects    After discussing the risk and benefits of care, patient consented to treatment    Patient's condition:  Patient had restrictions pre-manipulation and Symptoms are unchanged    Treatment effectiveness:  No change immediately after treatment    Plan:    Procedures:    Evaluation and Management:  51177 Moderate level exam 30 min    CMT:  NO CMT PER ORDERS      Modalities:  16314: Acupuncture, for 15 minutes:  Points:  LI4, LI2, SI5, GV20,  LV3, LV4, GB43, TH2, KI6, BL66, SI3, SP5, SP6, LI3, ST44, ST42, LU5  For 15 minutes    Therapeutic procedures:  None    Response to Treatment  Patient reports no change in symptoms     Prognosis: Guarded      Treatment plan and goals:  Goals:  Walking Lita would like to be able to walk without stumbling    Frequency of care  Duration of care is estimated to be 8 weeks, from the initial treatment.  It is estimated that the patient will need a total of 8 visits to resolve this episode.  For the initial therapeutic trial of care, the frequency is recommended at 1 X week, once daily.  A reevaluation would be clinically appropriate in 8 visits, to determine progress and further course of care.    In-Office Treatment  Evaluation  Acupuncture:        Recommendations:    Follow-up:  Return to care in one week     Disclaimer: This note consists of symbols  derived from keyboarding, dictation and/or voice recognition software. As a result, there may be errors in the script that have gone undetected. Please consider this when interpreting information found in this chart.

## 2020-10-28 ENCOUNTER — MYC MEDICAL ADVICE (OUTPATIENT)
Dept: PALLIATIVE MEDICINE | Facility: CLINIC | Age: 30
End: 2020-10-28

## 2020-10-28 DIAGNOSIS — G61.81 CHRONIC INFLAMMATORY DEMYELINATING POLYNEUROPATHY (H): Primary | ICD-10-CM

## 2020-10-28 LAB
BACTERIA SPEC CULT: NO GROWTH
SPECIMEN SOURCE: NORMAL

## 2020-10-28 NOTE — RESULT ENCOUNTER NOTE
Aleksey Manrique  Your blood cultures did not grow any bacteria.  Please call or MyChart my office with any questions or concerns.   Crissy Madrigal, PAC

## 2020-10-29 ENCOUNTER — HOSPITAL ENCOUNTER (OUTPATIENT)
Dept: PHYSICAL THERAPY | Facility: CLINIC | Age: 30
Setting detail: THERAPIES SERIES
End: 2020-10-29
Attending: PHYSICIAN ASSISTANT
Payer: COMMERCIAL

## 2020-10-29 ENCOUNTER — HOSPITAL ENCOUNTER (OUTPATIENT)
Dept: OCCUPATIONAL THERAPY | Facility: CLINIC | Age: 30
Setting detail: THERAPIES SERIES
End: 2020-10-29
Attending: PHYSICIAN ASSISTANT
Payer: COMMERCIAL

## 2020-10-29 PROCEDURE — 97112 NEUROMUSCULAR REEDUCATION: CPT | Mod: GP | Performed by: PHYSICAL THERAPIST

## 2020-10-29 PROCEDURE — 97110 THERAPEUTIC EXERCISES: CPT | Mod: GP | Performed by: PHYSICAL THERAPIST

## 2020-10-29 PROCEDURE — 97530 THERAPEUTIC ACTIVITIES: CPT | Mod: GO | Performed by: OCCUPATIONAL THERAPIST

## 2020-10-29 RX ORDER — CAPSAICIN 0.025 %
1 CREAM (GRAM) TOPICAL 3 TIMES DAILY PRN
Qty: 50 G | Refills: 1 | Status: SHIPPED | OUTPATIENT
Start: 2020-10-29 | End: 2020-12-15

## 2020-10-29 NOTE — TELEPHONE ENCOUNTER
Galenea message sent to patient:  Good Afternoon Hilaria    Janki did review your message.  Yes we can try capsaicin cream. Please use caution when using this cream because it may irritate your skin. If it does, you can discontinue use. A prescription was sent to your pharmacy.  It might be good to push out your November 2nd visit another week or two so we can discuss progress and see how its going.  Please call [phone] to move your appointment out.   Acupuncture does take a while to start becoming effective.  Sometimes you can get a good response with the first appointment, then next couple of visit can sometimes feel like they are not working at all.  Stick with it. The more acupuncture appointments you have, hopefully the more beneficial they will be for you.     Take Care,    Stephenie Perez RN  Care Coordinator  [facility]

## 2020-10-29 NOTE — TELEPHONE ENCOUNTER
Pt scheduled 11/2 for follow up. States she is unable to do video visit. Pt wants Janki to know that she would like to try that other option for cream and that she does have one more acupuncture visit schedule later next week. Pt wants to know if a follow up is needed Monday or if she should wait a little longer.    Clare MCCARTHY    [facility]

## 2020-10-29 NOTE — TELEPHONE ENCOUNTER
Routing to provider to review. Has scheduled follow up on Monday 11/02, has pain psychology appt 11/06. Should follow up be out a bit further? Also would like to try another topical option.     Per Ov Note 10/15/20:   Discussed lidocaine cream, Voltaren gel, and capsaicin cream today. She thinks lidocaine may have made her pain worse and capsaicin cream may have burned. I do think it would be reasonable to attempt another cream. She would like to start with Voltaren gel next. Apply to hands and feet up to 4x daily as needed. If not helpful, we may consider a repeat trial of capsaicin cream as tolerated.    Kait CASSIDY, RN Care Coordinator  [facility]  Pain Management

## 2020-10-29 NOTE — TELEPHONE ENCOUNTER
Will leave encounter open for patient response/chart review by nursing.     Aleksey Manrique,     Thanks for the update despite that things are not going so well. I do agree these are things that are appropriate to discuss at a follow up but I do not see that you have an appointment with Janki Goodman for follow up next week.  Your appointment on 11/06 is with our pain psychologist Jasmin Cordoba. I would recommend that you schedule one with Janki. Scheduling [phone]    Kait CASSIDY, RN Care Coordinator  [facility]  Pain Management    --------below from pt-----  Riky Shearer     I've been trying the cream for a little over a week. It's making my hands and feet discomfort able. I still don't feel a difference even with the upgrade of medication. I did acupuncture yesterday and still dont fell any difference. I have a week follow up next week.

## 2020-10-29 NOTE — TELEPHONE ENCOUNTER
Signed Prescriptions:                        Disp   Refills    capsaicin (ZOSTRIX) 0.025 % external cream 50 g   1        Sig: Apply 1 g topically 3 times daily as needed (pain)  Authorizing Provider: GLO LOZA    Yes we can try capsaicin cream, caution that it may irritate the skin. If it does, she can discontinue use.     It might be good to push out her visit another week or two so we can discuss progress and see how its going. Acupuncture does take a while to start becoming effective.

## 2020-10-29 NOTE — PROGRESS NOTES
[facility]      OUTPATIENT OCCUPATIONAL THERAPY  PLAN OF TREATMENT FOR OUTPATIENT REHABILITATION    Patient's Last Name, First Name, M.I.                YOB: 1990  Hilaria Bonner                        Provider's Name  [facility] Medical Record No.  [id number]                               Onset Date: 4/16/2020   Start of Care Date: 7/28/2020   Type:     ___PT   _X_OT   ___SLP Medical Diagnosis: H/o massive PE and cardiac arrest in 2019, ARAMIS, obesity, COVI-19 in April of 2020.                       OT Diagnosis: decreased I/ADL performance      _________________________________________________________________________________  Plan of Treatment:    Frequency/Duration: 1x/week for 32 days     Goals:    Goal Identifier 1   Goal Description Pt will reduce 9-hole peg test score with bilateral hands by at least 10 seconds to improve FMC for I/ADLs.   Target Date 11/26/20   Date Met  10/29/20   Progress: MET     Goal Identifier 2   Goal Description Pt will be able to tolerate light brushing of grey foam to both surfaces of hands and feet for one minute due to desensitization to allow for less pain during ADLs.     Target Date 11/26/20   Date Met      Progress:  Pt continues to have decreased sensation in bilateral hands and allodynia.  This has improved; more improvement in right hand versus left.  Relies on visual input to identify an object.  Decreased sensation and allodynia negatively impacts ability to perform I/ADLs independently.      Goal Identifier 3   Goal Description Pt will report adherence to, daily, BUE HEP to address strength and endurance at all tx sessions to improve function needed for I/ADLs.    Target Date 11/26/20   Date Met      Progress: On-going to improve strength, endurance, FMC and desensitization of hands.      Goal Identifier 4   Goal Description Pt  will report making meal involving at least 3 steps (ie chopping vegetables, cooking rice, mixing ingrediiants together) due to improved strength, endurance, FMC and less pain in hands.    Target Date 11/26/20   Date Met      Progress: Pt able to make simple meals/prep food.  Pt does not use knives. Not at baseline as she requires assistance from family.      Goal Identifier 5   Goal Description Pt will verbalize understanding of results of cognitive assessment, functional implications of score and at least 3 activities to improve cognition and at least 3 compensatory strategies as memory is improving for increase success with ADL/IADL.   Target Date 11/26/20   Date Met      Progress: on-going       Progress Toward Goals:   Pt motivated and adheres to home program. Very slow progress to return to baseline.  Decreased sensation in hands and allodynia negatively impact engagement in I/ADLs and leisure activities.     Certification date from 10/29/2020   to 11/30/2020.    Chen Oleary OT          I CERTIFY THE NEED FOR THESE SERVICES FURNISHED UNDER        THIS PLAN OF TREATMENT AND WHILE UNDER MY CARE     (Physician co-signature of this document indicates review and certification of the therapy plan).                Referring Provider: Crissy Madrigal PA-C

## 2020-11-02 NOTE — TELEPHONE ENCOUNTER
11/19/2020 Telephone visit scheduled with Janki Goodman NP.    Stephenie Perez RN  Care Coordinator  [facility]

## 2020-11-04 ENCOUNTER — HOME INFUSION (PRE-WILLOW HOME INFUSION) (OUTPATIENT)
Dept: PHARMACY | Facility: CLINIC | Age: 30
End: 2020-11-04

## 2020-11-04 ENCOUNTER — THERAPY VISIT (OUTPATIENT)
Dept: CHIROPRACTIC MEDICINE | Facility: CLINIC | Age: 30
End: 2020-11-04
Payer: COMMERCIAL

## 2020-11-04 DIAGNOSIS — M99.06 SOMATIC DYSFUNCTION OF LOWER EXTREMITIES: Primary | ICD-10-CM

## 2020-11-04 DIAGNOSIS — R20.2 NUMBNESS AND TINGLING OF BOTH FEET: ICD-10-CM

## 2020-11-04 DIAGNOSIS — R20.2 NUMBNESS AND TINGLING IN BOTH HANDS: ICD-10-CM

## 2020-11-04 DIAGNOSIS — R20.0 NUMBNESS AND TINGLING OF BOTH FEET: ICD-10-CM

## 2020-11-04 DIAGNOSIS — R20.0 NUMBNESS AND TINGLING IN BOTH HANDS: ICD-10-CM

## 2020-11-04 DIAGNOSIS — M99.07 SOMATIC DYSFUNCTION OF UPPER EXTREMITIES: ICD-10-CM

## 2020-11-04 PROCEDURE — 97810 ACUP 1/> WO ESTIM 1ST 15 MIN: CPT | Performed by: CHIROPRACTOR

## 2020-11-04 NOTE — PROGRESS NOTES
Visit #:  2 of 8, based on treatment plan    Subjective:  Hilaria Bonner is a 29 year old female who is seen in f/u up for:        Somatic dysfunction of lower extremities  Numbness and tingling of both feet  Somatic dysfunction of upper extremities  Numbness and tingling in both hands.     Since last visit on 10/27/2020,  Hilaria Bonner reports the following changes: Pain immediately after last treatment: 10/10 and their pain level today 10/10.  Hilaria reports that she is feeling about the same. There has been no change in her symptoms    Area of chief complaint:  Extra-spinal :  Symptoms are graded at 10/10. The quality is described as numb and tingling.  Motion has been about the same. Patient feels that they have not improved and feel the same.        Objective:  The following was observed:    P: palpatory tenderness none patietn still numb and cannot feel pressure in extremities:      A: static palpation demonstrates intersegmental asymmetry , ankle    R:     T: The following soft tissue hypotonicities were observed:        Assessment:    Segmental spinal dysfunction/restrictions found at:      Diagnoses:      1. Somatic dysfunction of lower extremities    2. Numbness and tingling of both feet    3. Somatic dysfunction of upper extremities    4. Numbness and tingling in both hands        Patient's condition:  Symptoms are unchanged    Treatment effectiveness:  Symptoms appear to be decreasing      Procedures:  CMT:  NO CMT PER MD ORDER      Modalities:  80949: Acupuncture, for 15 minutes:  Points:  LI4, LI2, SI5, GV20,  LV3, LV4, GB43, TH2, KI6, BL66, SI3, SP5, SP6, LI3, ST44, ST42, LU5  For 15 minutes    Therapeutic procedures:  None      Prognosis: Guarded    Progress towards Goals: Patient is making progress towards the goal   Patient has not made progress towards goal.    Response to Treatment:   Patient reports no change in symptoms       Recommendations:    Instructions:      Follow-up:   Return  to care in one week

## 2020-11-05 ENCOUNTER — ANCILLARY PROCEDURE (OUTPATIENT)
Dept: CT IMAGING | Facility: CLINIC | Age: 30
End: 2020-11-05
Attending: PHYSICIAN ASSISTANT
Payer: COMMERCIAL

## 2020-11-05 ENCOUNTER — HOSPITAL ENCOUNTER (OUTPATIENT)
Dept: OCCUPATIONAL THERAPY | Facility: CLINIC | Age: 30
Setting detail: THERAPIES SERIES
End: 2020-11-05
Attending: PHYSICIAN ASSISTANT
Payer: COMMERCIAL

## 2020-11-05 DIAGNOSIS — R61 NIGHT SWEATS: ICD-10-CM

## 2020-11-05 PROCEDURE — 97530 THERAPEUTIC ACTIVITIES: CPT | Mod: GO | Performed by: OCCUPATIONAL THERAPIST

## 2020-11-05 PROCEDURE — 74177 CT ABD & PELVIS W/CONTRAST: CPT | Performed by: RADIOLOGY

## 2020-11-05 PROCEDURE — 71260 CT THORAX DX C+: CPT | Performed by: RADIOLOGY

## 2020-11-05 RX ORDER — IOPAMIDOL 755 MG/ML
135 INJECTION, SOLUTION INTRAVASCULAR ONCE
Status: COMPLETED | OUTPATIENT
Start: 2020-11-05 | End: 2020-11-05

## 2020-11-05 RX ADMIN — IOPAMIDOL 135 ML: 755 INJECTION, SOLUTION INTRAVASCULAR at 11:26

## 2020-11-05 NOTE — PROGRESS NOTES
This is a recent snapshot of the patient's [facility] medical record.  For current drug dose and complete information and questions, call [phone] or In Basket pool, [facility] home infusion ([id number])  CSN Number:  [id number]

## 2020-11-05 NOTE — RESULT ENCOUNTER NOTE
Aleksey Manrique  Your CT chest, abdomen and pelvis looks fine.  I do not have an answer for your night sweats.  Are they improving?  Are you still taking kcl 20 meq daily for your potassium supplement?   Crissy Madrigal, PAC

## 2020-11-06 ENCOUNTER — VIRTUAL VISIT (OUTPATIENT)
Dept: PALLIATIVE MEDICINE | Facility: CLINIC | Age: 30
End: 2020-11-06
Payer: COMMERCIAL

## 2020-11-06 ENCOUNTER — HOME INFUSION (PRE-WILLOW HOME INFUSION) (OUTPATIENT)
Dept: PHARMACY | Facility: CLINIC | Age: 30
End: 2020-11-06

## 2020-11-06 ENCOUNTER — MYC MEDICAL ADVICE (OUTPATIENT)
Dept: FAMILY MEDICINE | Facility: CLINIC | Age: 30
End: 2020-11-06

## 2020-11-06 DIAGNOSIS — G61.81 CHRONIC INFLAMMATORY DEMYELINATING POLYNEUROPATHY (H): Primary | ICD-10-CM

## 2020-11-06 DIAGNOSIS — R61 NIGHT SWEATS: Primary | ICD-10-CM

## 2020-11-06 DIAGNOSIS — R23.2 HOT FLASHES: ICD-10-CM

## 2020-11-06 DIAGNOSIS — E87.6 HYPOKALEMIA: ICD-10-CM

## 2020-11-06 PROCEDURE — 96158 HLTH BHV IVNTJ INDIV 1ST 30: CPT | Mod: 95 | Performed by: PSYCHOLOGIST

## 2020-11-06 NOTE — PROGRESS NOTES
"Hilaria Bonner is a 29 year old female who is being evaluated via a billable telephone visit.      The patient has been notified of following:     \"This telephone visit will be conducted via a call between you and Jasmin Prince PsyD LP. We have found that certain health care needs can be provided without the need for an in-person session.  This service lets us provide the care you need with a phone conversation.       If during the course of the call I feel a telephone visit is not appropriate, you will not be charged for this service.\"      Reviewed that patient is in a quiet private place, no recording without permission, all apps and notifications of any devices are turned off. Began the session by talking about the risks and benefits of telehealth, what to do if there is a break in the connection, reviewed the safety protocol for during and after sessions. The purpose of using telehealth for this session was due to the COVID-19 pandemic and was to reduce the spread of the disease and protect both the clinician and client.             Patient has given verbal consent for telephone visit? YES    Phone call contact time  Call Started at 1:30 PM  Call Ended at 2:02 PM    Total 32 minutes    Pain Diagnoses per pain provider:   Chronic inflammatory demyelinating polyneuropathy (H)        DATA: During today's visit you reported the following: Your pain is the same. Your mood is more irritable - tired of being told to be patient. Your activity level is mildly increased - walking with son daily, getting back in the kitchen to cook. Your stress level is the same - 'I'm always stressing about something'. Your sleep is not great - toss and turn yet despite recent addition of Seroquel. You reported engaging in self-care for your pain by talking to mother or aunt several times per week.    You identified that you would like to focus on the following or had questions regarding the following issues or concerns, and we " discussed th following:   - ED visit for low potassium levels  - started acupuncture and hopeful this will be helpful  - started Seroquel  - self-care and self-comfort skills - sound and touch are important to you  - positive self-talk - reminding yourself to be patient even though it is annoying to be told this over and over  - trying to navigate future  - radical acceptance    ASSESSMENT: Working on engaging in self-care, maintaining positive outlook and attitude.     PLAN:   Your next appointment is scheduled for 12/9 at 11:00 AM.  Assignment/Objectives /interventions for next session:   - practice self-soothing skills daily to reinforce use when needed  - continue to engage in positive self-talk    Telephone-Visit Details    Type of service:  Telephone Visit    Originating Location (pt. Location): Home    Distant Location (provider location):  [facility]     Mode of Communication:  Telephone    I have reviewed the note as documented above.  This accurately captures the substance of my conversation with the patient.    Jasmin Prince PsyD LP  Licensed Psychologist  Outpatient Clinic Therapist  [facility]    Disclaimer: This note consists of symbols derived from keyboarding, dictation and/or voice recognition software. As a result, there may be errors in the script that have gone undetected. Please consider this when interpreting information found in this chart.

## 2020-11-06 NOTE — TELEPHONE ENCOUNTER
Phone call to patient -she just got off phone with therapist  Still waking up hot and sweaty 4-5 times during the night with drenching sweats.  Not able to sleep in spite of seroquel 50 mg at bedtime.  Encouraged to increase to 3 tablets or 75 mg at bedtime and follow up with endocrinology at [facility] ([phone]) formerly called [facility].   She has had low potassium in past and currently taking potassium 20 meq -had emergency department visit for hypokalemia 2 weeks ago- will recheck basic metabolic panel next week.

## 2020-11-09 NOTE — TELEPHONE ENCOUNTER
RECORDS RECEIVED FROM: internal    DATE RECEIVED: 11.18.20    NOTES (FOR ALL VISITS) STATUS DETAILS   OFFICE NOTES from referring provider Internal  Crissy Madrigal   OFFICE NOTES from other specialist na    ED NOTES Internal  5.29.20 corinna YEPEZ     OPERATIVE REPORT  (thyroid, pituitary, adrenal, parathyroid) na    MEDICATION LIST Internal     IMAGING      DEXASCAN na    MRI (BRAIN) na    XR (Chest) CE [facility] 5.11.20   [facility]- 4.17.20   - 5.18.19, 5.17.19, 5.14.19, 5.13.19   CT (HEAD/NECK/CHEST/ABDOMEN) ce [facility]- 2.29.20, 12.25.19,   - 5.21.19, 5.18.19,    NUCLEAR  na    ULTRASOUND (HEAD/NECK) na    LABS     DIABETES: HBGA1C, CREATININE, FASTING LIPIDS, MICROALBUMIN URINE, POTASSIUM, TSH, T4    THYROID: TSH, T4, CBC, THYRODLONULIN, TOTAL T3, FREE T4, CALCITONIN, CEA Internal  CREATININE 12.25.19   HBGA1C 3.18 19   LIPIDS 3.18.20   POTASSIUM 12.25.19   TSH/T4- 10.22.20         Action 11.9.20 sv   Action Taken Image request sent to   [facility]- cxr- 5.11.20 -- received in PACS ---    [facility]-  CXR- 4.17.20,  CT-  2.29.20, 12.25.19,     [facility]-   cxr- 5.18.19, 5.17.19, 5.14.19, 5.13.19-- received in PACS ---  CT- 5.21.19, 5.18.19, -- received in PACS ---        Action 11.10.20 MJ   Action Taken Received CD from [facility]. Sent to  for processing.      Action 11.13.20 sv   Action Taken 2nd request sent to [facility] for   CXR- 4.17.20,  CT-  2.29.20, 12.25.19,         Action 11.16.20 sv   Action Taken 3rd request sent to [facility], called and LVM on pushing images   [facility]-  CXR- 4.17.20,  CT-  2.29.20, 12.25.19,

## 2020-11-09 NOTE — PROGRESS NOTES
This is a recent snapshot of the patient's [facility] medical record.  For current drug dose and complete information and questions, call [phone] or In Basket pool, [facility] home infusion ([id number])  CSN Number:  [id number]

## 2020-11-10 ENCOUNTER — HOSPITAL ENCOUNTER (OUTPATIENT)
Dept: PHYSICAL THERAPY | Facility: CLINIC | Age: 30
Setting detail: THERAPIES SERIES
End: 2020-11-10
Attending: PHYSICIAN ASSISTANT
Payer: COMMERCIAL

## 2020-11-10 ENCOUNTER — ALLIED HEALTH/NURSE VISIT (OUTPATIENT)
Dept: PEDIATRICS | Facility: CLINIC | Age: 30
End: 2020-11-10
Payer: COMMERCIAL

## 2020-11-10 DIAGNOSIS — E53.8 VITAMIN B12 DEFICIENCY (NON ANEMIC): Primary | ICD-10-CM

## 2020-11-10 DIAGNOSIS — E87.6 HYPOKALEMIA: ICD-10-CM

## 2020-11-10 LAB
ANION GAP SERPL CALCULATED.3IONS-SCNC: 9 MMOL/L (ref 3–14)
BUN SERPL-MCNC: 9 MG/DL (ref 7–30)
CALCIUM SERPL-MCNC: 8.9 MG/DL (ref 8.5–10.1)
CHLORIDE SERPL-SCNC: 104 MMOL/L (ref 94–109)
CO2 SERPL-SCNC: 24 MMOL/L (ref 20–32)
CREAT SERPL-MCNC: 0.69 MG/DL (ref 0.52–1.04)
GFR SERPL CREATININE-BSD FRML MDRD: >90 ML/MIN/{1.73_M2}
GLUCOSE SERPL-MCNC: 114 MG/DL (ref 70–99)
POTASSIUM SERPL-SCNC: 3.9 MMOL/L (ref 3.4–5.3)
SODIUM SERPL-SCNC: 137 MMOL/L (ref 133–144)

## 2020-11-10 PROCEDURE — 96372 THER/PROPH/DIAG INJ SC/IM: CPT

## 2020-11-10 PROCEDURE — 99207 PR NO CHARGE NURSE ONLY: CPT

## 2020-11-10 PROCEDURE — 97112 NEUROMUSCULAR REEDUCATION: CPT | Mod: GP | Performed by: PHYSICAL THERAPIST

## 2020-11-10 PROCEDURE — 97110 THERAPEUTIC EXERCISES: CPT | Mod: GP | Performed by: PHYSICAL THERAPIST

## 2020-11-10 PROCEDURE — 80048 BASIC METABOLIC PNL TOTAL CA: CPT | Performed by: PHYSICIAN ASSISTANT

## 2020-11-10 RX ADMIN — CYANOCOBALAMIN 1000 MCG: 1000 INJECTION, SOLUTION INTRAMUSCULAR; SUBCUTANEOUS at 10:43

## 2020-11-10 NOTE — RESULT ENCOUNTER NOTE
Aleksey Manrique  Your electrolytes and kidney function were normal.  Continue the potassium as you have been taking.   Your potassium was marginally elevated but not concerning since this was not a fasting specimen.   Please call or MyChart my office with any questions or concerns.    Crissy Madrigal, PAC

## 2020-11-10 NOTE — PROGRESS NOTES
Clinic Administered Medication Documentation      Injectable Medication Documentation    Patient was given Cyanocobalamin (B-12). Prior to medication administration, verified patients identity using patient s name and date of birth. Please see MAR and medication order for additional information. Patient instructed to remain in clinic for 15 minutes.      Was entire vial of medication used? Yes  Vial/Syringe: Single dose vial  Expiration Date:  04/30/2022  Was this medication supplied by the patient? No    Name of provider who requested the medication administration: Dr. Madrigal  Name of provider on site (faculty or community preceptor) at the time of performing the medication administration: Dr. Diaz    Date of next administration: 11/29/2020  Date of next office visit with provider to renew medication plan (must be seen annually): 11/18/2020    Crissy De Dios CMA

## 2020-11-10 NOTE — PROGRESS NOTES
[facility]      OUTPATIENT PHYSICAL THERAPY  PLAN OF TREATMENT FOR OUTPATIENT REHABILITATION    Patient's Last Name, First Name, M.I.                YOB: 1990  Hilaria Bonner                        Provider's Name  [facility] Medical Record No.  [id number]                               Onset Date: 5/29/20   Start of Care Date: 8/4/20   Type:     _X_PT   ___OT   ___SLP Medical Diagnosis: polyneuropathy, difficulty walking                        PT Diagnosis: weakness and balance impairments       _________________________________________________________________________________  Plan of Treatment:    Frequency/Duration: 1 time per week for additional 6-10 weeks.      Goals:  Goal Identifier floor<>stand    Goal Description Emily will perform floor<>stand transfer with light support from anterior surface in 2/2 trials with steady balance upon standing in order to more easily get up and down from ground when playing with kids.    Target Date 10/02/20   Date Met  10/29/20   Progress:MET     Goal Identifier FGA- progressing, 24/30 on 10/29   Goal Description Emily will score a 25 or > on FGA in order to show improved balance for home and community ambulation to prevent falls.    Target Date 10/02/20   Date Met      Progress:Progressing, 1 point away from meeting her FGA goal. Balance continues to improve and no longer needs any gait aids or UE support when walking.      Goal Identifier sit<>stand    Goal Description Emily will perform sit<>stand from 18'' surface x 5 rep without UEs in order to show improved LE strength needed for transfers and day to day activites.    Target Date 10/02/20   Date Met  08/18/20   Progress: MET     Goal Identifier New goal-- walking program    Goal Description Emily will ambulate for 30 minutes 5 times per week to improve her functional  endurance and allow her to better keep up with her kids and movement in the community.    Target Date 12/27/20   Date Met      Progress:     Goal Identifier New Goal-- SLS    Goal Description Emily will be able to stand for 10 secs on each LE in 2/3 trials in order to show improved strength and balance needed for daily activites.    Target Date 12/27/20   Date Met      Progress:     Goal Identifier 6MWT    Goal Description Emily will perform 6MWT with least restrictive gait AD at 1.1 m/s or > in order to keep up with kids when walking to the park or going on errand in the community.    Target Date 11/01/20   Date Met 10/29/20     Progress Toward Goals:   Patient has been seen for 10 PT sessions. When she started PT, she was needing to use a walker for gait and she had to cancel several sessions d/t imbalance and increased neuropathy. She still has neuropathy issues but overall strength and balance are improving. She is score a 24/30 on FGA placing her above fall risk (however, she still will benefit from work on dynamic balance). She is able to stand on 1 leg for 3-5 secs, below average for her age. She has shown improved strength is legs but will continue to benefit from HEP.     Certification date from 10/30/20 to 1/27/201.    Ena Alicia, PT          I CERTIFY THE NEED FOR THESE SERVICES FURNISHED UNDER        THIS PLAN OF TREATMENT AND WHILE UNDER MY CARE     (Physician co-signature of this document indicates review and certification of the therapy plan).                Referring Provider: Crissy Madrigal PA-C

## 2020-11-11 ENCOUNTER — THERAPY VISIT (OUTPATIENT)
Dept: CHIROPRACTIC MEDICINE | Facility: CLINIC | Age: 30
End: 2020-11-11
Payer: COMMERCIAL

## 2020-11-11 DIAGNOSIS — R20.2 NUMBNESS AND TINGLING IN BOTH HANDS: ICD-10-CM

## 2020-11-11 DIAGNOSIS — R20.0 NUMBNESS AND TINGLING IN BOTH HANDS: ICD-10-CM

## 2020-11-11 DIAGNOSIS — M99.06 SOMATIC DYSFUNCTION OF LOWER EXTREMITIES: Primary | ICD-10-CM

## 2020-11-11 DIAGNOSIS — M99.07 SOMATIC DYSFUNCTION OF UPPER EXTREMITIES: ICD-10-CM

## 2020-11-11 DIAGNOSIS — R20.0 NUMBNESS AND TINGLING OF BOTH FEET: ICD-10-CM

## 2020-11-11 DIAGNOSIS — R20.2 NUMBNESS AND TINGLING OF BOTH FEET: ICD-10-CM

## 2020-11-11 PROCEDURE — 97810 ACUP 1/> WO ESTIM 1ST 15 MIN: CPT | Performed by: CHIROPRACTOR

## 2020-11-11 NOTE — PROGRESS NOTES
Visit #:  23of 8, based on treatment plan    Subjective:  Hilaria Bonner is a 29 year old female who is seen in f/u up for:        Somatic dysfunction of lower extremities  Numbness and tingling of both feet  Somatic dysfunction of upper extremities  Numbness and tingling in both hands.     Since last visit on 11/4/2020,  Hilaria Bonner reports the following changes: Pain immediately after last treatment: 10/10 and their pain level today 10/10.  Hilaria reports that she is feeling about the same. There has been no change in her symptoms though she does not that some of the points used in her feet and hands were having a burning sensation for a day or so.  She feels that are hands are worse but feels this maybe due to the colder weather.    Area of chief complaint:  Extra-spinal :  Symptoms are graded at 10/10. The quality is described as numb and tingling.  Motion has been about the same. Patient feels that they have not improved and feel the same.        Objective:  The following was observed:    P: palpatory tenderness none patietn still numb and cannot feel pressure in extremities:      A: static palpation demonstrates intersegmental asymmetry , ankle    R:     T: The following soft tissue hypotonicities were observed:        Assessment:    Segmental spinal dysfunction/restrictions found at:      Diagnoses:      1. Somatic dysfunction of lower extremities    2. Numbness and tingling of both feet    3. Somatic dysfunction of upper extremities    4. Numbness and tingling in both hands        Patient's condition:  Symptoms are unchanged    Treatment effectiveness:  Symptoms appear to be decreasing      Procedures:  CMT:  NO CMT PER MD ORDER      Modalities:  60185: Acupuncture, for 15 minutes:  Points:  LI4, LI2, SI5, GV20,  LV3, LV4, GB43, TH2, KI6, BL66, SI3, SP5, SP6, LI3, ST44, ST42, LU5  For 15 minutes    Therapeutic procedures:  None      Prognosis: Guarded    Progress towards Goals: Patient is making  progress towards the goal   Patient has not made progress towards goal.    Response to Treatment:   Patient reports no change in symptoms       Recommendations:    Instructions:      Follow-up:   Return to care in one week

## 2020-11-12 DIAGNOSIS — G47.00 INSOMNIA, UNSPECIFIED TYPE: ICD-10-CM

## 2020-11-12 LAB — LAB SCANNED RESULT: NORMAL

## 2020-11-16 RX ORDER — QUETIAPINE FUMARATE 25 MG/1
50 TABLET, FILM COATED ORAL AT BEDTIME
Qty: 60 TABLET | Refills: 1 | Status: SHIPPED | OUTPATIENT
Start: 2020-11-16 | End: 2020-12-14

## 2020-11-16 NOTE — TELEPHONE ENCOUNTER
Routing refill request to provider for review/approval because:  Blood pressure fails protocol  Sig line is a titrating dose, please review  Tiffanie Lr RN  [facility]

## 2020-11-17 DIAGNOSIS — R06.02 SHORTNESS OF BREATH: Primary | ICD-10-CM

## 2020-11-17 NOTE — PROGRESS NOTES
"Hilaria Bonner is a 29 year old female who is being evaluated via a billable telephone visit.      The patient has been notified of following:     \"This telephone visit will be conducted via a call between you and your physician/provider. We have found that certain health care needs can be provided without the need for a physical exam.  This service lets us provide the care you need with a short phone conversation.  If a prescription is necessary we can send it directly to your pharmacy.  If lab work is needed we can place an order for that and you can then stop by our lab to have the test done at a later time.    Telephone visits are billed at different rates depending on your insurance coverage. During this emergency period, for some insurers they may be billed the same as an in-person visit.  Please reach out to your insurance provider with any questions.    If during the course of the call the physician/provider feels a telephone visit is not appropriate, you will not be charged for this service.\"    Patient has given verbal consent for Telephone visit?  Yes    What phone number would you like to be contacted at? [phone]    How would you like to obtain your AVS? Ferdinand Joyce MA  [facility]      Recommendations at last vist on 10/15/2020:   1.  Pain Physical Therapy:      NO   Continue neuro physical therapy and occupational therapy on a regular basis as planned.    2.  Pain Psychologist to address relaxation, behavioral change, coping style, and other factors important to improvement.     YES  Have follow up with Jasmin Prince PsyD, LP as recommended and as able. This resource should be a priority.    3.  Medication Management:     1. Continue Lyrica 200mg TID- max dose.     2. Recent increase in Cymbalta has not been notable but no side effects. We discussed attempting max dose in hope of improved pain and she is interested. Increase Cymbalta to 60mg BID for a " "total dose of 120mg daily- max dose. Okay to use up remaining 30mg capsules.     3. Initiation of Topamax has not seemed to be helpful and she thinks may have worsened headaches (though has been sleeping poorly). We discussed increasing to determine if pain benefit or tapering off and she wants to try dose increase. Adjust Topamax dosing to 50mg BID for 1 week. Then increase to 50mg in the morning and 100mg in the evening for 1 week. If this goes okay, increase to 100mg BID and stay at this dose. If headaches worsen or if she has other bothersome side effects, let me know. If not having at least some benefit by next visit we will taper off.    4. Discussed lidocaine cream, Voltaren gel, and capsaicin cream today. She thinks lidocaine may have made her pain worse and capsaicin cream may have burned. I do think it would be reasonable to attempt another cream. She would like to start with Voltaren gel next. Apply to hands and feet up to 4x daily as needed. If not helpful, we may consider a repeat trial of capsaicin cream as tolerated.   4.  Potential procedures: no    5.  Referrals: acupuncture may be helpful and addresses pain from a different perspective. She is open to this. Order placed. Schedule visits once weekly as able. Monitor pain benefit.    6.  Follow up with RG Puri CNP in 4-6 weeks.       Additional provider notes:  -Her pain is about the same as it was at last visit.  -States she has gotten better at \"just working through it,\" and that most resources we have tried thus far haven't been very helpful.    -She increased Cymbalta as directed to 60mg BID. She isn't sure how much this has been helpful but would like to give Cymbalta a fair shot, at least another two months. She has struggled with sweating/hot flashes for the last month. She had a visit with endocrinology who thought it could be due to increased dose of Cymbalta and recommended reduction in dose.   -She also increased Topamax to " "100mg BID as directed. States she hasn't had any pain benefit with this since starting or increasing. Denies side effects.   -She had x4 visits of acupuncture with José Kunz. She denies any pain benefit and was released from care today due to lack of progress.   -She had x1 visit with Jasmin Prince PsyD, LP, plans to have follow up next month. She thinks that this could be something that could be helpful, would appreciate the extra support.   -She had follow up with Dr. Chua with Neurology yesterday. He recommended she have two more months of IVIG every 3 weeks and then less often, maybe monthly, if stable. Hilaria denies any significant improvement in pain or symptoms.   -She continues neuro physical therapy and occupational therapy, once weekly. She has been helping out in the kitchen more.   -She tried Voltaren gel as directed but found that this burned her hands. She then tried capsaicin cream but found that this also burned her hands. She notes that anything applied topically seems to hurt worse.  -She continues to have pain in her feet as well which she describes as a burning sensation.  The pain goes up to her wrist and ankle.      Current pain medications:              Lyrica 200mg TID- [facility], \"a little bit\"    Topamax 100mg BID- ?, no difference since starting or increasing to this dose              Norco 5/325mg BID- [facility], UDS positive for alcohol and ETG confirmatory, understands this or other opioids will not be prescribed, ran out of this 1-2 weeks ago              Cymbalta 120mg daily- ?, SE/? sweating for the last month, unsure if related but endo suggested reducing               Hydroxyzine 25-50mg prn- H for anxiety, ran out of              Folate 1mg daily     1. Previous Pain Relevant Medications:  NOTE: This medication information taken from patient's intake form, not medical records.               Opiates: Tramadol- [facility], oxycodone- ?/[facility]              NSAIDS: doesn't take anti-inflammatories " "due to gastric bypass              Muscle Relaxants: tizanidine- H for sleep only              Anti-migraine mediations: no              Anti-depressants: amitriptyline (up to 75mg)- ?, \"it put me to sleep\", Cymbalta- ?              Sleep aids: no              Anxiolytics: hydroxyzine- H               Neuropathics: Lyrica- [facility], gabapentin (started on for numbness in toes and migraines after cardiac arrest)- SE, fatigue, Topamax- H for weight loss, NH for pain              Topicals: gabapentin cream- NH/[facility], lidocaine cream- NH, W, burning, capsaicin cream- NH, W, burning              Other medications not covered above: Tylenol- NH     2. Physical Therapy: going to neuro PT and OT   3. Pain Psychology: yes Jasmin Prince PsyD  x1 visit   4. Surgery: gastric bypass March 2019  5. Injections: no  6. Chiropractic: no  7. Acupuncture: yes x4 sessions with José Kunz [facility]  8. TENS Unit: no    Assessment:  Hilaria Bonner is a 29 year old female with a past medical history significant for PE with associated PEA arrest, morbid obesity s/p sleeve gastrectomy, pancreatitis, and recent COVID 19 who presents with complaints of upper and lower extremity pain.      1. 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   2. Mental Health - the patient's mental health concerns, specifically anxiety, affect her experience of pain and contribute to her clinically significant distress.     1. Chronic inflammatory demyelinating polyneuropathy (H)           Plan:     1.  Pain Physical Therapy:     YES   Encouraged Hilaria continue neuro physical therapy and occupational therapy on a regular basis as recommended and as planned.     2.  Pain Psychologist to address relaxation, behavioral change, coping style, and other factors important to improvement.     YES  Hilaria has only had one visit of pain " psychology but thinks this will be helpful. Recommended she continue to see Jasmin Cordoba PsyD, LP per her direction, this should be a priority.    4.  Medication Management:      1. Increase of Cymbalta to 120mg does not seem to have provided pain or mood benefit. SHe has had increased night sweats over the last month, may be related. She will decrease Cymbalta to 90mg daily and monitor if sweating improves.    2. Given lack of benefit with initiation and titration of Topamax, we will taper off. Directions provided to taper off.     3. Continue Lyrica 200mg TID.     4. We discussed that Jony meets criteria for intractable pain and I could certify her for medical cannabis. She isn't sure how she feels about this and would like to discuss with her mother. She will let me know if interested.    5.  Potential procedures: not at this time.     6.  Referrals: discontinue acupuncture as this has not been helpful. Did encouraged she consider the purchase of a TENS unit.   7.  Follow up with RG Puri CNP in 4-6 weeks.     Phone call duration: 21 minutes    RG Ibrahim CNP

## 2020-11-17 NOTE — PROGRESS NOTES
"Hilaria Bonner is a 29 year old female who is being evaluated via a billable video visit.      The patient has been notified of following:     \"This video visit will be conducted via a call between you and your physician/provider. We have found that certain health care needs can be provided without the need for an in-person physical exam.  This service lets us provide the care you need with a video conversation.  If a prescription is necessary we can send it directly to your pharmacy.  If lab work is needed we can place an order for that and you can then stop by our lab to have the test done at a later time.    Video visits are billed at different rates depending on your insurance coverage.  Please reach out to your insurance provider with any questions.    If during the course of the call the physician/provider feels a video visit is not appropriate, you will not be charged for this service.\"    Patient has given verbal consent for Video visit? Yes  How would you like to obtain your AVS? MyChart  If you are dropped from the video visit, the video invite should be resent to: Text to cell phone: [phone]  Will anyone else be joining your video visit? No        Video-Visit Details    Type of service:  Video Visit    Video Start Time: 11:21 AM  Video End Time: 11:38 AM    Originating Location (pt. Location): Other Car    Distant Location (provider location):  [facility] [place]     Platform used for Video Visit: Aura Medina       The patient is seen in consultation at the request of Dr. Crissy Madrigal.     Hilaria Bonner is a 29 year old female with a PMH significant for secondary hyperparathyroidism, morbid obesity, menorrhagia, and chronic inflammatory demyelinating polyneuropathy after tyler covid who presents with new hot flashes. About a month ago, she started having hot flashes at night. She uses a fan to sleep, but still wakes up about 6 times " a night feeling hot and sweaty. It has significantly interrupted her ability to sleep. She does not get the symptoms during the day unless she takes a nap. She has had several recent medication changes. About a month ago her cymbalta dose was increased. She also recently started infusions for her neuropathy, lyrica, and xarelto. Pt has a history of normal menstrual cycles. She has been on depot shot for the last year (last shot in Oct). About a month ago she had breakthrough bleeding for the entire month straight. Prior to being on the depot shot she tried OCPs for several months. No problems with menstruation prior to contraception use. Pt also endorses decreased libido and vaginal dryness. She has chronic headaches. Some nausea, but no vommiting, stomach pain, constipation or diarrhea. Occasional palpitations, but she has an upcoming appt with cardiology. No changes in ring or shoe size, changes in hair or nails, changes in vision, or muscle aches. Pt has already had an extensive workup which has included an unremarkable CT CAP, pelvic US showing thickened endometrial stripe, endometrial biopsy on 10/15/2020 which showed no hyperplasia or atypia, normal thyroid function labs, normal MRI of brain, normal UA, and negative TB.    Past Medical History   Past Medical History:   Diagnosis Date     Acute kidney injury (H) 05/13/2019     Cardiac arrest (H) 05/13/2019     Earache or other ear, nose, or throat complaint 12/15    tyroid     Pulmonary embolus (H) 05/13/2019       Past Surgical History   Past Surgical History:   Procedure Laterality Date     GYN SURGERY      2 C-sections     KNEE SURGERY  most recently - [id number]    3 surgeries in [place]     LAPAROSCOPIC GASTRIC SLEEVE N/A 3/26/2019    Procedure: Laparoscopic Sleeve Gastrectomy;  Surgeon: Luan Lopez MD;  Location: [facility] OR     ORTHOPEDIC SURGERY      2 knee meniscus surgery       Current Medications    Current Outpatient Medications:      capsaicin  (ZOSTRIX) 0.025 % external cream, Apply 1 g topically 3 times daily as needed (pain), Disp: 50 g, Rfl: 1     cholecalciferol 50 MCG (2000 UT) tablet, Take 1 tablet by mouth daily, Disp: 90 tablet, Rfl: 3     diclofenac (VOLTAREN) 1 % topical gel, Place 2 g onto the skin 4 times daily, Disp: 100 g, Rfl: 1     diphenhydrAMINE (BENADRYL) 50 MG/ML injection, , Disp: , Rfl:      DULoxetine (CYMBALTA) 30 MG capsule, , Disp: , Rfl:      DULoxetine (CYMBALTA) 60 MG capsule, Take 1 capsule (60 mg) by mouth 2 times daily, Disp: 60 capsule, Rfl: 1     EPINEPHrine (ANY BX GENERIC EQUIV) 0.3 MG/0.3ML injection 2-pack, , Disp: , Rfl:      folic acid (FOLVITE) 1 MG tablet, Take 1 tablet (1 mg) by mouth daily, Disp: 30 tablet, Rfl: 11     HYDROcodone-acetaminophen (NORCO) 5-325 MG tablet, Take 1-2 tablets by mouth every 6 hours as needed, Disp: , Rfl:      hydrOXYzine (ATARAX) 25 MG tablet, Take 1-2 tablets (25-50 mg) by mouth every 6 hours as needed for anxiety or other (adjuvant pain), Disp: 90 tablet, Rfl: 1     ibuprofen (ADVIL/MOTRIN) 600 MG tablet, Take 600 mg by mouth every 6 hours as needed, Disp: , Rfl:      lisinopril (ZESTRIL) 10 MG tablet, Take 1 tablet (10 mg) by mouth daily, Disp: 90 tablet, Rfl: 1     melatonin 1 MG TABS tablet, Take 1 tablet (1 mg) by mouth nightly as needed for sleep, Disp:  , Rfl:      Multiple Vitamins-Minerals (MULTIVITAMIN ADULTS) TABS, Take 1 tablet by mouth daily, Disp: , Rfl:      ondansetron (ZOFRAN-ODT) 4 MG ODT tab, Take 4 mg by mouth every 8 hours as needed, Disp: , Rfl:      phenylephrine-shark liver oil-mineral oil-petrolatum (PREPARATION H) 0.25-14-74.9 % rectal ointment, Place rectally 2 times daily as needed for hemorrhoids, Disp:  , Rfl:      polyethylene glycol (MIRALAX) 17 GM/SCOOP powder, Take 17 g (1 capful) by mouth daily, Disp: 850 g, Rfl: 3     Pregabalin (LYRICA) 200 MG capsule, Take 1 capsule (200 mg) by mouth 3 times daily, Disp: 90 capsule, Rfl: 3     PRIVIGEN 20  GM/200ML SOLN, , Disp: , Rfl:      PRIVIGEN 40 GM/400ML SOLN, , Disp: , Rfl:      QUEtiapine (SEROQUEL) 25 MG tablet, Take 2 tablets (50 mg) by mouth At Bedtime, Disp: 60 tablet, Rfl: 1     rivaroxaban ANTICOAGULANT (XARELTO ANTICOAGULANT) 20 MG TABS tablet, Take 1 tablet (20 mg) by mouth daily (with dinner), Disp: 30 tablet, Rfl: 0     SOLU-CORTEF 100 MG injection, , Disp: , Rfl:      topiramate (TOPAMAX) 25 MG tablet, , Disp: , Rfl:      topiramate (TOPAMAX) 50 MG tablet, Take 2 tablets (100 mg) by mouth 2 times daily, Disp: 60 tablet, Rfl: 1     vitamin (B COMPLEX) tablet, Take 1 tablet by mouth daily, Disp: 90 tablet, Rfl: 3     vitamin C (ASCORBIC ACID) 1000 MG TABS, Take 1 tablet (1,000 mg) by mouth daily, Disp: 30 tablet, Rfl: 0     vitamin D3 (CHOLECALCIFEROL) 2000 units (50 mcg) tablet, Take 1 tablet (2,000 Units) by mouth daily, Disp: 30 tablet, Rfl: 0    Current Facility-Administered Medications:      cyanocobalamin injection 1,000 mcg, 1,000 mcg, Intramuscular, Q30 Days, Crissy Madrigal PA-C, 1,000 mcg at 11/10/20 1043     medroxyPROGESTERone (DEPO-PROVERA) syringe 150 mg, 150 mg, Intramuscular, Q90 Days, Crissy Madrigal PA-C, 150 mg at 09/30/20 1726    Family History   Problem Relation Age of Onset     Diabetes Father      Hypertension Father      Breast Cancer Sister 26        surgery only     Cancer Sister      Coronary Artery Disease Other         paternal aunt     Thyroid Disease Other         neice     Coronary Artery Disease Other      Cerebrovascular Disease Other      Breast Cancer Sister      Thyroid Disease Other      Cerebrovascular Disease No family hx of        Social History  She denies smoking, drinking alcohol or using illicit drugs.  Social History     Socioeconomic History     Marital status: Single     Spouse name: Not on file     Number of children: 2   Occupational History   Social Needs     Non-medical: Not on file   Tobacco Use     Smoking status: Heavy Tobacco Smoker      Packs/day: 0.30     Years: 1.00     Pack years: 0.30     Types: Cigarettes     Start date: 2016     Last attempt to quit: 2018     Years since quittin.8     Smokeless tobacco: Never Used   Substance and Sexual Activity     Alcohol use: Not Currently     Alcohol/week: 0.0 standard drinks     Comment: occasional     Drug use: No     Sexual activity: Yes     Partners: Male     Birth control/protection: Injection   Lifestyle       Review of Systems   A comprehensive ROS was negative except as indicated in the HPI.               Vital Signs     Previous Weights:    Wt Readings from Last 10 Encounters:   10/22/20 117.9 kg (260 lb)   10/15/20 122.1 kg (269 lb 1.6 oz)   20 107 kg (236 lb)   20 112 kg (247 lb)   20 113.7 kg (250 lb 9.6 oz)   20 113.7 kg (250 lb 9.6 oz)   20 113.9 kg (251 lb)   20 107 kg (236 lb)   20 106.8 kg (235 lb 8 oz)   20 112.5 kg (248 lb)        Physical Exam  GENERAL: Healthy, alert and no distress  EYES: Eyes grossly normal to inspection.  No discharge or erythema, or obvious scleral/conjunctival abnormalities.  RESP: No audible wheeze, cough, or visible cyanosis.    SKIN: Visible skin clear.   NEURO: Alert and oriented.  Mentation and speech appropriate for age.  PSYCH: Mentation appears normal, affect normal/bright, judgement and insight intact, normal speech and appearance well-groomed.     Lab Results  I reviewed prior lab results documented in Epic.  TSH   Date Value Ref Range Status   10/22/2020 1.71 0.40 - 4.00 mU/L Final   2020 1.26 0.40 - 4.00 mU/L Final   2020 0.80 0.40 - 4.00 mU/L Final   2020 0.61 0.40 - 4.00 mU/L Final   2019 0.44 0.40 - 4.00 mU/L Final       Assessment   Night sweats and hot flash:  Hilaria Bonner is a 29 year old female with with a PMH significant for secondary hyperparathyroidism, morbid obesity, menorrhagia, and chronic inflammatory demyelinating polyneuropathy after  tyler covid who presents for evaluation of 1 month of hot flashes and night sweats in the context of decreased libido and vaginal dryness. A broad differential diagnosis includes premature ovarian insufficiency, thyroid pathology, growth hormone excess, prolactinoma, malignancy, and medication change. Pt has already had extensive workup with negative MRI of the head, normal thyroid function tests, CT CAP, endometrial biopsy and UA. This rules out/decreases likelihood of  thyroid pathology, malignancy, pheochromocytoma  and prolactinoma.  Given that her cymbalta was increased around the same time she first developed hot flashes/night sweats, it is possible that this could be the underlying etiology for her symptoms. Premature ovarian insufficiency is also possible, however she is on the depo provera injection q 3 months right now and thus we cannot order labs assessing her HPG hormone axis at this time. For now, I recommend that pt discusses reducing the dose of her cymbalta slightly with pain clinic and see if this makes a difference.  Depo Provera injection has also risk of hot flashes more than 30% of the time therefore this could be also the underlying cause for the hot flashes.  If symptoms persist after the reduction of Cymbalta; looking into a different contraception method might be helpful.  Her last Depo-Provera injection was in October 2020.  We will follow-up with patient in a couple of months to assess symptoms.  Assessment for premature ovarian failure can be also considered once patient is off of Depo-Provera.   Patient is agreeable with plan at this time.        Physician Attestation   I, Joseph Kinney, was present with the medical student who participated in the service and in the documentation of the note.  I have verified the history and personally performed the physical exam and medical decision making.  I agree with the assessment and plan of care as documented in the note.      I  personally reviewed vital signs, medications, labs and imaging.    29-year-old female patient presenting with night sweats and hot flash of about 1 month duration.  Symptoms happen only at night and wakes her up to 5-6 times at night.  Extensive work-up has been performed as summarized above.  Reviewed previously done lab results imaging studies including MRI of the brain and CT abdomen pelvis.  Imaging studies revealed normal adrenal gland and pituitary gland which makes conditions like pheochromocytoma or prolactin producing tumors less likely.  Work-up for malignancy or infectious causes has been negative. Hyperthyroidism has been ruled out.     In terms of medications, patient is currently on 2 medications that can potentially cause night sweats/hot flash.  Depo-Provera inhibits pituitary gonadotropins and this medication has a side effect of hot flash up to 30% of the time and could be the likely underlying cause for her symptoms.  Cymbalta can also cause flushing and diaphoresis.  Dose adjustment of Cymbalta was made a month ago which corresponds to her symptoms.  As a first step it might be reasonable to slightly lower the dose of Cymbalta and see if it makes a difference.  If dose adjustment of Cymbalta Is not helpful then the next step would be looking at Depo-Provera and considering other contraception method.  We will follow-up with patient clinically in a couple of months to assess next steps.    Joseph Kinney MD  Date of Service (when I saw the patient): 11/18/20

## 2020-11-18 ENCOUNTER — VIRTUAL VISIT (OUTPATIENT)
Dept: ENDOCRINOLOGY | Facility: CLINIC | Age: 30
End: 2020-11-18
Attending: PHYSICIAN ASSISTANT
Payer: COMMERCIAL

## 2020-11-18 ENCOUNTER — OFFICE VISIT (OUTPATIENT)
Dept: NEUROLOGY | Facility: CLINIC | Age: 30
End: 2020-11-18
Payer: COMMERCIAL

## 2020-11-18 ENCOUNTER — PRE VISIT (OUTPATIENT)
Dept: ENDOCRINOLOGY | Facility: CLINIC | Age: 30
End: 2020-11-18

## 2020-11-18 VITALS
OXYGEN SATURATION: 95 % | SYSTOLIC BLOOD PRESSURE: 148 MMHG | DIASTOLIC BLOOD PRESSURE: 106 MMHG | HEART RATE: 104 BPM | RESPIRATION RATE: 16 BRPM

## 2020-11-18 DIAGNOSIS — R61 NIGHT SWEATS: Primary | ICD-10-CM

## 2020-11-18 DIAGNOSIS — R23.2 FLUSHES: ICD-10-CM

## 2020-11-18 DIAGNOSIS — G61.81 CIDP (CHRONIC INFLAMMATORY DEMYELINATING POLYNEUROPATHY) (H): Primary | ICD-10-CM

## 2020-11-18 PROCEDURE — 99214 OFFICE O/P EST MOD 30 MIN: CPT | Performed by: PSYCHIATRY & NEUROLOGY

## 2020-11-18 PROCEDURE — 99244 OFF/OP CNSLTJ NEW/EST MOD 40: CPT | Mod: 95 | Performed by: INTERNAL MEDICINE

## 2020-11-18 RX ORDER — HYDROCODONE BITARTRATE AND ACETAMINOPHEN 5; 325 MG/1; MG/1
1-2 TABLET ORAL EVERY 6 HOURS PRN
COMMUNITY
Start: 2020-11-13 | End: 2020-12-15

## 2020-11-18 RX ORDER — IBUPROFEN 600 MG/1
600 TABLET, FILM COATED ORAL EVERY 6 HOURS PRN
COMMUNITY
Start: 2020-11-13 | End: 2020-12-15

## 2020-11-18 RX ORDER — ONDANSETRON 4 MG/1
4 TABLET, ORALLY DISINTEGRATING ORAL EVERY 8 HOURS PRN
COMMUNITY
Start: 2020-11-13 | End: 2020-12-14

## 2020-11-18 NOTE — LETTER
11/18/2020       RE: Hilaria Bonner  [address]  [address]  [facility] [address]     Dear Colleague,    Thank you for referring your patient, Hilaria Bonner, to the [facility] [place] [facility]. Please see a copy of my visit note below.    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  In April 2020 she developed confirmed COVID infection. About 4 weeks later her neurologic symptoms began. Her first symptom was tingling in her hands where it then  progressed to a burning in her hands about 4-5 days after the numbness.  It then quickly progressed to involving bilateral legs below the knee and then her feet. She felt weak in her hands and feet. Fine motor tasks and gait became impaired. She has trouble picking up objects because she has to watch herself  objects. She needed a walker. She also felt that her speech became periodically slurred. The sensory symptoms that included pain and allodynia were most problematic in her hands and feet. NCS in 7/20 showed absent sensory responses in the upper and lower limbs and normal motor responses. In 8/20 IVIG 2 gm/kg loading and then 1 gm/kg q 3 week maintenance was started. Through the fall 2020 she made slow improvements, particular in function and gait.     Interval History:   We last saw her in clinic 9/9/20. Since then she feels like her balance and walking are about 50% better. She has an easier time with day to day tasks at home. Stairs are easier. She feels her fine motor coordination in the upper limbs is stable. She notes the continues to be the most limiting factor to her function in the upper limbs. She continues to have pain in her feet as well which she describes as a burning sensation.  The pain goes up to her wrist and ankle.  She has  been seen in the pain clinic with some improvements to her pain. She takes Lyrica 200mg TID, Topiramate 100mg BID, and Cymbalta 60mg BID. She also receives accupuncture. She no longer is using capsaicin cream and voltaren gel.  She only takes norco on rare occassions. Her last IVIG was on11/6/20. She has noted mild headaches with IVIG infusion but improves with tylenol. No fluctuation of symptoms between IVIG infusions.                                             Prior pertinent laboratory work-up:  5/2020:  ANH 1:160. Vitamin B1 low (45). B12 low/normal (285). Negative/normal double stranded DNA, ANCA, C-reactive, RF, GM1, GD1, Gq1b, vitamin A, B6, Vit E, SSA, SSB undetectable.  6/2020 CSF: 0 WBC, 0 RBC, protein 58 glucose 29.  7/2020: Normal Vitamin B1, B12, folate    8/2020: TS-H[place] [address] (N<19700). Negative FGFR3, Immunofixation, paraneoplastic panel,GD1a.     Prior electrophysiologic work-up:  7/23/20 NCS/EMG showed all absent upper and lower limb SNAPS and all normal upper and lower limb motor responses.   8/3/20 NCS/EMG showed absent right  median, ulnar, and sural sensory studies with radial having attenuated amplitude.      Prior pertinent imaging work-up:  5/20: MRI brain with and without contrast was essentially normal  5/20: MRI C spine with and without contrast showed no abnormal enhancement in the spinal cord, thecal sac or cervical vertebrae. No spinal canal or neural foraminal narrowing.    Past Medical History:   Past Medical History:   Diagnosis Date     Acute kidney injury (H) 05/13/2019     Cardiac arrest (H) 05/13/2019     Earache or other ear, nose, or throat complaint 12/15    tyroid     Pulmonary embolus (H) 05/13/2019     Past Surgical History:  Past Surgical History:   Procedure Laterality Date     GYN SURGERY      2 C-sections     KNEE SURGERY  most recently - [id number]    3 surgeries in [place]     LAPAROSCOPIC GASTRIC SLEEVE N/A 3/26/2019    Procedure: Laparoscopic Sleeve  Gastrectomy;  Surgeon: Luan Lopez MD;  Location: [facility] OR     ORTHOPEDIC SURGERY      2 knee meniscus surgery     Family history:    There is no known family history of hereditary neuropathies or other neuromuscular disorders.     Social History:    Social History     Tobacco Use     Smoking status: Heavy Tobacco Smoker     Packs/day: 0.30     Years: 1.00     Pack years: 0.30     Types: Cigarettes     Start date: 2016     Last attempt to quit: 2018     Years since quittin.8     Smokeless tobacco: Never Used   Substance Use Topics     Alcohol use: Not Currently     Alcohol/week: 0.0 standard drinks     Comment: occasional     Drug use: No      Medical Allergies:   No Known Allergies     Current Medications:    Current Outpatient Medications:      capsaicin (ZOSTRIX) 0.025 % external cream, Apply 1 g topically 3 times daily as needed (pain), Disp: 50 g, Rfl: 1     cholecalciferol 50 MCG (2000 UT) tablet, Take 1 tablet by mouth daily, Disp: 90 tablet, Rfl: 3     diclofenac (VOLTAREN) 1 % topical gel, Place 2 g onto the skin 4 times daily, Disp: 100 g, Rfl: 1     diphenhydrAMINE (BENADRYL) 50 MG/ML injection, , Disp: , Rfl:      DULoxetine (CYMBALTA) 30 MG capsule, , Disp: , Rfl:      DULoxetine (CYMBALTA) 60 MG capsule, Take 1 capsule (60 mg) by mouth 2 times daily, Disp: 60 capsule, Rfl: 1     EPINEPHrine (ANY BX GENERIC EQUIV) 0.3 MG/0.3ML injection 2-pack, , Disp: , Rfl:      folic acid (FOLVITE) 1 MG tablet, Take 1 tablet (1 mg) by mouth daily, Disp: 30 tablet, Rfl: 11     HYDROcodone-acetaminophen (NORCO) 5-325 MG tablet, Take 1-2 tablets by mouth every 6 hours as needed, Disp: , Rfl:      hydrOXYzine (ATARAX) 25 MG tablet, Take 1-2 tablets (25-50 mg) by mouth every 6 hours as needed for anxiety or other (adjuvant pain), Disp: 90 tablet, Rfl: 1     ibuprofen (ADVIL/MOTRIN) 600 MG tablet, Take 600 mg by mouth every 6 hours as needed, Disp: , Rfl:      lisinopril (ZESTRIL) 10 MG tablet, Take 1  tablet (10 mg) by mouth daily, Disp: 90 tablet, Rfl: 1     melatonin 1 MG TABS tablet, Take 1 tablet (1 mg) by mouth nightly as needed for sleep, Disp:  , Rfl:      Multiple Vitamins-Minerals (MULTIVITAMIN ADULTS) TABS, Take 1 tablet by mouth daily, Disp: , Rfl:      ondansetron (ZOFRAN-ODT) 4 MG ODT tab, Take 4 mg by mouth every 8 hours as needed, Disp: , Rfl:      phenylephrine-shark liver oil-mineral oil-petrolatum (PREPARATION H) 0.25-14-74.9 % rectal ointment, Place rectally 2 times daily as needed for hemorrhoids, Disp:  , Rfl:      polyethylene glycol (MIRALAX) 17 GM/SCOOP powder, Take 17 g (1 capful) by mouth daily, Disp: 850 g, Rfl: 3     Pregabalin (LYRICA) 200 MG capsule, Take 1 capsule (200 mg) by mouth 3 times daily, Disp: 90 capsule, Rfl: 3     PRIVIGEN 20 GM/200ML SOLN, , Disp: , Rfl:      PRIVIGEN 40 GM/400ML SOLN, , Disp: , Rfl:      QUEtiapine (SEROQUEL) 25 MG tablet, Take 2 tablets (50 mg) by mouth At Bedtime, Disp: 60 tablet, Rfl: 1     rivaroxaban ANTICOAGULANT (XARELTO ANTICOAGULANT) 20 MG TABS tablet, Take 1 tablet (20 mg) by mouth daily (with dinner), Disp: 30 tablet, Rfl: 0     SOLU-CORTEF 100 MG injection, , Disp: , Rfl:      topiramate (TOPAMAX) 25 MG tablet, , Disp: , Rfl:      topiramate (TOPAMAX) 50 MG tablet, Take 2 tablets (100 mg) by mouth 2 times daily, Disp: 60 tablet, Rfl: 1     vitamin (B COMPLEX) tablet, Take 1 tablet by mouth daily, Disp: 90 tablet, Rfl: 3     vitamin C (ASCORBIC ACID) 1000 MG TABS, Take 1 tablet (1,000 mg) by mouth daily, Disp: 30 tablet, Rfl: 0     vitamin D3 (CHOLECALCIFEROL) 2000 units (50 mcg) tablet, Take 1 tablet (2,000 Units) by mouth daily, Disp: 30 tablet, Rfl: 0    Current Facility-Administered Medications:      cyanocobalamin injection 1,000 mcg, 1,000 mcg, Intramuscular, Q30 Days, Crissy Madrigal PA-C, 1,000 mcg at 11/10/20 1043     medroxyPROGESTERone (DEPO-PROVERA) syringe 150 mg, 150 mg, Intramuscular, Q90 Days, Crissy Madrigal PA-C,  150 mg at 09/30/20 1726    Review of Systems: A complete review of systems was obtained and was negative except for what was noted above.     Physical examination:    BP (!) 148/106   Pulse 104   Resp 16   SpO2 95%     General Appearance: NAD    Skin: There are no rashes or other skin lesions.    Musculoskeletal:  There is no scoliosis, lordosis, kyphosis, pes cavus, or hammertoes.    Neurologic examination:    Mental status:  Patient is alert, attentive, and oriented x 3.  Language is coherent and fluent without aphasia.  Memory, comprehension and ability to follow commands were intact.       Cranial nerves:   Pupils were round and reacted to light.  Extraocular movements were full. There was no face, jaw, palate or tongue weakness or atrophy. Hearing was grossly intact.  Shoulder shrug was normal.       Motor exam: No atrophy or fasciculations.   Manual muscle testing revealed the following MRC grade muscle power:   Right Left   Neck flexion 5    Neck extension: 5    Shoulder abduction:  5 5   Elbow extension: 5 5   Elbow flexion:  5 5   Wrist flexion:  5 5   Wrist extension:  5 5   FDI 4+ 4+   Hip flexion 5 5   Knee flexion 5 5   Knee extension 5 5   Dorsiflexion 5 5   Plantar flexion 5 5   Green indicates improved compared to last exam  Red indicates worse compared to last exam    Complex motor skills: No tremor. Mild ataxia with FNF.    Sensory exam: Pin absent to knee and elbow bilaterally  Vibration absent at toe, ankle, 3s at knee, absent at finger and wrist. 5s at elbow. Proprioception absent at toe, ankle, finger, and wrist.        Gait: Base is now only slightly wide. Strides are now smooth and regular jessica.     Deep tendon reflexes:   Right Left   Triceps 1 1   Biceps 1 1   Brachioradialis 2 2   Knee jerk 0 0   Ankle jerk 0 0     Neuropathy Assessments  Neurology Assessments 11/18/2020 9/9/2020 8/3/2020   RODS CIDP/MGUSP Score 29 16 20   Time for 'Up and Go' test- Seconds: 10.2 15.9 24.09       Strength: 2020 2020 8/3/2020   Right hand strength in K 20 16   Left hand strength in K 22 20     Immunotherapy 2020 2020 8/3/2020   Time since last IVIG (days): 12 5 days -   Current treatment: IVIG 1gm/kg i8elufo IVIG 1 gm/kg q 3 weeks none     Assessment:    Hilaria Bonner is a 29 year old woman with an acute onset, non-length dependant sensory neuropathy or ganglionopathy. 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  The evolution and pattern of the neuropathy or neuronopathy is strongly suggestive of an immune mediated mechanism. The onset of the neuropathy relative to the timing of COVID19 highly suggests that COVID19 triggered the presumed immune mediated disorder in a similar way that other antecedent infections are known to trigger Guillain-Helena Syndrome. It is reassuring to find that improvements over the last month have been objective and functionally meaningful.      Plan:      1. IVIG: Continue maintenance therapy of 1g/kg q 3 weeks for now. If she remains stable or improved at next visit will begin IVIG weaning/optimization.   2. Pain: Continue follow up in the multidisciplinary pain clinic. Appreciate collateral care.   3. Gait: continue physical therapy  4. May repeat NCS to see if there have been any changes/imporovements in sensory responses at some point, but not now.   5. Follow up in 2 months. Sooner if needed.     Seen and discussed with Dr. Chua. His addendum follows.     Víctor Cm MD  Neuromuscular Fellow  ----  ADDENDUM:   I personally interviewed and examined the patient. I agree with the history, physical, assessment, and plan as documented above. 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 Although neuropathy due to nutritional deficiencies has also been considered, this is less likely. The  "presence of a TS-HDS antibody is of uncertain significance, but may support neurologic disimmunity. It is reassuring to find that she has made slow objective improvements since initiating IVIG. Improvements in disability (I-RODS) and gait (TUG time) have been unequivocal. Will continue with IVIG for now, with the plan to begin optimization after her next visit provided she remains stable/improved. Supportive care with pain management and physical therapy remains important, and I am glad to find that she is approaching pain in a multidisciplinary setting. Agree with plan as detailed above.     Travon Chua MD    ADDENDUM:   12/2/20: Note received from patient. She asks about the utility of repeating the NCS at this point. She also reports that she has been noticing more tingling and stiffness in her hands and feet, and wonders if it could be related to the cold. Additionally, she reports that \"when I wake during the night it feels like I have weights on my lower legs(knee below) which I haven't felt for a very long time since the beginning on me having nerve damage. The only change I've had lately is decreasing my cymbolta due to the endo Dr thinking it was causing me to have night sweats even with the decrease I'm still having night sweats and  took me completely off topirmate because we believe it wasn't beneficial and also stopped acupuncture after 4 Sessions due to  also believed it wasn't beneficial for me. I will most likely start cannabis once my application is approved I did it this morning and it could take up to 30 days for approval.\"    I would be reasonable to take another look at the NCS at this point. Probably unlikely that the SNAPs have improved, but if so that would be good to know. It also will be beneficial to know that the motor responses are stable. I will help her arrange the test before her appointment with me in January.     Again, thank you for allowing me to participate " in the care of your patient.      Sincerely,    Travon Chua MD

## 2020-11-18 NOTE — NURSING NOTE
Chief Complaint   Patient presents with     Follow Up     [facility]       Joseph Winters

## 2020-11-18 NOTE — PROGRESS NOTES
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  In April 2020 she developed confirmed COVID infection. About 4 weeks later her neurologic symptoms began. Her first symptom was tingling in her hands where it then  progressed to a burning in her hands about 4-5 days after the numbness.  It then quickly progressed to involving bilateral legs below the knee and then her feet. She felt weak in her hands and feet. Fine motor tasks and gait became impaired. She has trouble picking up objects because she has to watch herself  objects. She needed a walker. She also felt that her speech became periodically slurred. The sensory symptoms that included pain and allodynia were most problematic in her hands and feet. NCS in 7/20 showed absent sensory responses in the upper and lower limbs and normal motor responses. In 8/20 IVIG 2 gm/kg loading and then 1 gm/kg q 3 week maintenance was started. Through the fall 2020 she made slow improvements, particular in function and gait.     Interval History:   We last saw her in clinic 9/9/20. Since then she feels like her balance and walking are about 50% better. She has an easier time with day to day tasks at home. Stairs are easier. She feels her fine motor coordination in the upper limbs is stable. She notes the continues to be the most limiting factor to her function in the upper limbs. She continues to have pain in her feet as well which she describes as a burning sensation.  The pain goes up to her wrist and ankle.  She has been seen in the pain clinic with some improvements to her pain. She takes Lyrica 200mg TID, Topiramate 100mg BID, and Cymbalta 60mg BID. She also receives accupuncture. She no longer is using capsaicin cream and voltaren gel.  She only takes norco on rare occassions. Her last IVIG was on11/6/20. She has noted mild  headaches with IVIG infusion but improves with tylenol. No fluctuation of symptoms between IVIG infusions.                                             Prior pertinent laboratory work-up:  5/2020:  ANH 1:160. Vitamin B1 low (45). B12 low/normal (285). Negative/normal double stranded DNA, ANCA, C-reactive, RF, GM1, GD1, Gq1b, vitamin A, B6, Vit E, SSA, SSB undetectable.  6/2020 CSF: 0 WBC, 0 RBC, protein 58 glucose 29.  7/2020: Normal Vitamin B1, B12, folate    8/2020: TS-H[place] elevated [place] [address] (N<[id number]). Negative FGFR3, Immunofixation, paraneoplastic panel,GD1a.     Prior electrophysiologic work-up:  7/23/20 NCS/EMG showed all absent upper and lower limb SNAPS and all normal upper and lower limb motor responses.   8/3/20 NCS/EMG showed absent right  median, ulnar, and sural sensory studies with radial having attenuated amplitude.      Prior pertinent imaging work-up:  5/20: MRI brain with and without contrast was essentially normal  5/20: MRI C spine with and without contrast showed no abnormal enhancement in the spinal cord, thecal sac or cervical vertebrae. No spinal canal or neural foraminal narrowing.    Past Medical History:   Past Medical History:   Diagnosis Date     Acute kidney injury (H) 05/13/2019     Cardiac arrest (H) 05/13/2019     Earache or other ear, nose, or throat complaint 12/15    tyroid     Pulmonary embolus (H) 05/13/2019     Past Surgical History:  Past Surgical History:   Procedure Laterality Date     GYN SURGERY      2 C-sections     KNEE SURGERY  most recently - [id number]    3 surgeries in [place]     LAPAROSCOPIC GASTRIC SLEEVE N/A 3/26/2019    Procedure: Laparoscopic Sleeve Gastrectomy;  Surgeon: Luan Lopez MD;  Location: [facility] OR     ORTHOPEDIC SURGERY      2 knee meniscus surgery     Family history:    There is no known family history of hereditary neuropathies or other neuromuscular disorders.     Social History:    Social History     Tobacco Use     Smoking status: Heavy  Tobacco Smoker     Packs/day: 0.30     Years: 1.00     Pack years: 0.30     Types: Cigarettes     Start date: 2016     Last attempt to quit: 2018     Years since quittin.8     Smokeless tobacco: Never Used   Substance Use Topics     Alcohol use: Not Currently     Alcohol/week: 0.0 standard drinks     Comment: occasional     Drug use: No      Medical Allergies:   No Known Allergies     Current Medications:    Current Outpatient Medications:      capsaicin (ZOSTRIX) 0.025 % external cream, Apply 1 g topically 3 times daily as needed (pain), Disp: 50 g, Rfl: 1     cholecalciferol 50 MCG (2000 UT) tablet, Take 1 tablet by mouth daily, Disp: 90 tablet, Rfl: 3     diclofenac (VOLTAREN) 1 % topical gel, Place 2 g onto the skin 4 times daily, Disp: 100 g, Rfl: 1     diphenhydrAMINE (BENADRYL) 50 MG/ML injection, , Disp: , Rfl:      DULoxetine (CYMBALTA) 30 MG capsule, , Disp: , Rfl:      DULoxetine (CYMBALTA) 60 MG capsule, Take 1 capsule (60 mg) by mouth 2 times daily, Disp: 60 capsule, Rfl: 1     EPINEPHrine (ANY BX GENERIC EQUIV) 0.3 MG/0.3ML injection 2-pack, , Disp: , Rfl:      folic acid (FOLVITE) 1 MG tablet, Take 1 tablet (1 mg) by mouth daily, Disp: 30 tablet, Rfl: 11     HYDROcodone-acetaminophen (NORCO) 5-325 MG tablet, Take 1-2 tablets by mouth every 6 hours as needed, Disp: , Rfl:      hydrOXYzine (ATARAX) 25 MG tablet, Take 1-2 tablets (25-50 mg) by mouth every 6 hours as needed for anxiety or other (adjuvant pain), Disp: 90 tablet, Rfl: 1     ibuprofen (ADVIL/MOTRIN) 600 MG tablet, Take 600 mg by mouth every 6 hours as needed, Disp: , Rfl:      lisinopril (ZESTRIL) 10 MG tablet, Take 1 tablet (10 mg) by mouth daily, Disp: 90 tablet, Rfl: 1     melatonin 1 MG TABS tablet, Take 1 tablet (1 mg) by mouth nightly as needed for sleep, Disp:  , Rfl:      Multiple Vitamins-Minerals (MULTIVITAMIN ADULTS) TABS, Take 1 tablet by mouth daily, Disp: , Rfl:      ondansetron (ZOFRAN-ODT) 4 MG ODT tab, Take 4  mg by mouth every 8 hours as needed, Disp: , Rfl:      phenylephrine-shark liver oil-mineral oil-petrolatum (PREPARATION H) 0.25-14-74.9 % rectal ointment, Place rectally 2 times daily as needed for hemorrhoids, Disp:  , Rfl:      polyethylene glycol (MIRALAX) 17 GM/SCOOP powder, Take 17 g (1 capful) by mouth daily, Disp: 850 g, Rfl: 3     Pregabalin (LYRICA) 200 MG capsule, Take 1 capsule (200 mg) by mouth 3 times daily, Disp: 90 capsule, Rfl: 3     PRIVIGEN 20 GM/200ML SOLN, , Disp: , Rfl:      PRIVIGEN 40 GM/400ML SOLN, , Disp: , Rfl:      QUEtiapine (SEROQUEL) 25 MG tablet, Take 2 tablets (50 mg) by mouth At Bedtime, Disp: 60 tablet, Rfl: 1     rivaroxaban ANTICOAGULANT (XARELTO ANTICOAGULANT) 20 MG TABS tablet, Take 1 tablet (20 mg) by mouth daily (with dinner), Disp: 30 tablet, Rfl: 0     SOLU-CORTEF 100 MG injection, , Disp: , Rfl:      topiramate (TOPAMAX) 25 MG tablet, , Disp: , Rfl:      topiramate (TOPAMAX) 50 MG tablet, Take 2 tablets (100 mg) by mouth 2 times daily, Disp: 60 tablet, Rfl: 1     vitamin (B COMPLEX) tablet, Take 1 tablet by mouth daily, Disp: 90 tablet, Rfl: 3     vitamin C (ASCORBIC ACID) 1000 MG TABS, Take 1 tablet (1,000 mg) by mouth daily, Disp: 30 tablet, Rfl: 0     vitamin D3 (CHOLECALCIFEROL) 2000 units (50 mcg) tablet, Take 1 tablet (2,000 Units) by mouth daily, Disp: 30 tablet, Rfl: 0    Current Facility-Administered Medications:      cyanocobalamin injection 1,000 mcg, 1,000 mcg, Intramuscular, Q30 Days, Crissy Madrigal PA-C, 1,000 mcg at 11/10/20 1043     medroxyPROGESTERone (DEPO-PROVERA) syringe 150 mg, 150 mg, Intramuscular, Q90 Days, Crissy Madrigal PA-C, 150 mg at 09/30/20 1726    Review of Systems: A complete review of systems was obtained and was negative except for what was noted above.     Physical examination:    BP (!) 148/106   Pulse 104   Resp 16   SpO2 95%     General Appearance: NAD    Skin: There are no rashes or other skin  lesions.    Musculoskeletal:  There is no scoliosis, lordosis, kyphosis, pes cavus, or hammertoes.    Neurologic examination:    Mental status:  Patient is alert, attentive, and oriented x 3.  Language is coherent and fluent without aphasia.  Memory, comprehension and ability to follow commands were intact.       Cranial nerves:   Pupils were round and reacted to light.  Extraocular movements were full. There was no face, jaw, palate or tongue weakness or atrophy. Hearing was grossly intact.  Shoulder shrug was normal.       Motor exam: No atrophy or fasciculations.   Manual muscle testing revealed the following MRC grade muscle power:   Right Left   Neck flexion 5    Neck extension: 5    Shoulder abduction:  5 5   Elbow extension: 5 5   Elbow flexion:  5 5   Wrist flexion:  5 5   Wrist extension:  5 5   FDI 4+ 4+   Hip flexion 5 5   Knee flexion 5 5   Knee extension 5 5   Dorsiflexion 5 5   Plantar flexion 5 5   Green indicates improved compared to last exam  Red indicates worse compared to last exam    Complex motor skills: No tremor. Mild ataxia with FNF.    Sensory exam: Pin absent to knee and elbow bilaterally  Vibration absent at toe, ankle, 3s at knee, absent at finger and wrist. 5s at elbow. Proprioception absent at toe, ankle, finger, and wrist.        Gait: Base is now only slightly wide. Strides are now smooth and regular jessica.     Deep tendon reflexes:   Right Left   Triceps 1 1   Biceps 1 1   Brachioradialis 2 2   Knee jerk 0 0   Ankle jerk 0 0     Neuropathy Assessments  Neurology Assessments 2020 2020 8/3/2020   RODS CIDP/MGUSP Score 29 16 20   Time for 'Up and Go' test- Seconds: 10.2 15.9 24.09      Strength: 2020 2020 8/3/2020   Right hand strength in K 20 16   Left hand strength in K 22 20     Immunotherapy 2020 2020 8/3/2020   Time since last IVIG (days): 12 5 days -   Current treatment: IVIG 1gm/kg x2zzejc IVIG 1 gm/kg q 3 weeks none     Assessment:     Hilaria Bonner is a 29 year old woman with an acute onset, non-length dependant sensory neuropathy or ganglionopathy. 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  The evolution and pattern of the neuropathy or neuronopathy is strongly suggestive of an immune mediated mechanism. The onset of the neuropathy relative to the timing of COVID19 highly suggests that COVID19 triggered the presumed immune mediated disorder in a similar way that other antecedent infections are known to trigger Guillain-[place] Syndrome. It is reassuring to find that improvements over the last month have been objective and functionally meaningful.      Plan:      1. IVIG: Continue maintenance therapy of 1g/kg q 3 weeks for now. If she remains stable or improved at next visit will begin IVIG weaning/optimization.   2. Pain: Continue follow up in the multidisciplinary pain clinic. Appreciate collateral care.   3. Gait: continue physical therapy  4. May repeat NCS to see if there have been any changes/imporovements in sensory responses at some point, but not now.   5. Follow up in 2 months. Sooner if needed.     Seen and discussed with Dr. Chua. His addendum follows.     Víctor Cm MD  Neuromuscular Fellow  ----  ADDENDUM:   I personally interviewed and examined the patient. I agree with the history, physical, assessment, and plan as documented above. 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 Although neuropathy due to nutritional deficiencies has also been considered, this is less likely. The presence of a TS-HDS antibody is of uncertain significance, but may support neurologic disimmunity. It is reassuring to find that she has made slow objective improvements since initiating IVIG. Improvements in disability (I-RODS) and gait (TUG time) have been unequivocal. Will continue with IVIG  "for now, with the plan to begin optimization after her next visit provided she remains stable/improved. Supportive care with pain management and physical therapy remains important, and I am glad to find that she is approaching pain in a multidisciplinary setting. Agree with plan as detailed above.     Travon Chua MD    ADDENDUM:   12/2/20: Note received from patient. She asks about the utility of repeating the NCS at this point. She also reports that she has been noticing more tingling and stiffness in her hands and feet, and wonders if it could be related to the cold. Additionally, she reports that \"when I wake during the night it feels like I have weights on my lower legs(knee below) which I haven't felt for a very long time since the beginning on me having nerve damage. The only change I've had lately is decreasing my cymbolta due to the endo Dr thinking it was causing me to have night sweats even with the decrease I'm still having night sweats and  took me completely off topirmate because we believe it wasn't beneficial and also stopped acupuncture after 4 Sessions due to  also believed it wasn't beneficial for me. I will most likely start cannabis once my application is approved I did it this morning and it could take up to 30 days for approval.\"    I would be reasonable to take another look at the NCS at this point. Probably unlikely that the SNAPs have improved, but if so that would be good to know. It also will be beneficial to know that the motor responses are stable. I will help her arrange the test before her appointment with me in January.                "

## 2020-11-18 NOTE — PATIENT INSTRUCTIONS
Riky Manrique, it was nice to meet you today!    We discussed about today that extensive work-up has been done so far to understand the underlying reason for night sweats and hot flashes.  The results of imaging study or blood work results did not reveal any potential causes that can explain your symptoms.    As the next step, we recommend to look into medications.  We discussed about looking into Cymbalta and see if dose reduction of this medication would be helpful from your symptom standpoint.  The second medication is Depo-Provera injection.  Depo-Provera injection has a side effect of hot flashes.  This medication would be the second place to look into if dose reduction of the Cymbalta is not helpful.  I understand that you had the Depo-Provera injection in October 2020.  Let us plan on touching base again with you in a few weeks to follow-up on your symptoms and will discuss further next steps at that time.    Please let me know if you have any questions in the meantime.    Joseph Kinney MD

## 2020-11-19 ENCOUNTER — VIRTUAL VISIT (OUTPATIENT)
Dept: PALLIATIVE MEDICINE | Facility: CLINIC | Age: 30
End: 2020-11-19
Payer: COMMERCIAL

## 2020-11-19 ENCOUNTER — THERAPY VISIT (OUTPATIENT)
Dept: CHIROPRACTIC MEDICINE | Facility: CLINIC | Age: 30
End: 2020-11-19
Payer: COMMERCIAL

## 2020-11-19 DIAGNOSIS — M99.06 SOMATIC DYSFUNCTION OF LOWER EXTREMITIES: Primary | ICD-10-CM

## 2020-11-19 DIAGNOSIS — R20.0 NUMBNESS AND TINGLING IN BOTH HANDS: ICD-10-CM

## 2020-11-19 DIAGNOSIS — M99.07 SOMATIC DYSFUNCTION OF UPPER EXTREMITIES: ICD-10-CM

## 2020-11-19 DIAGNOSIS — R20.0 NUMBNESS AND TINGLING OF BOTH FEET: ICD-10-CM

## 2020-11-19 DIAGNOSIS — G61.81 CHRONIC INFLAMMATORY DEMYELINATING POLYNEUROPATHY (H): ICD-10-CM

## 2020-11-19 DIAGNOSIS — R20.2 NUMBNESS AND TINGLING IN BOTH HANDS: ICD-10-CM

## 2020-11-19 DIAGNOSIS — G61.81 CHRONIC INFLAMMATORY DEMYELINATING POLYNEUROPATHY (H): Primary | ICD-10-CM

## 2020-11-19 DIAGNOSIS — R20.2 NUMBNESS AND TINGLING OF BOTH FEET: ICD-10-CM

## 2020-11-19 PROCEDURE — 99213 OFFICE O/P EST LOW 20 MIN: CPT | Mod: 95 | Performed by: NURSE PRACTITIONER

## 2020-11-19 PROCEDURE — 97810 ACUP 1/> WO ESTIM 1ST 15 MIN: CPT | Performed by: CHIROPRACTOR

## 2020-11-19 RX ORDER — TOPIRAMATE 50 MG/1
100 TABLET, FILM COATED ORAL 2 TIMES DAILY
Qty: 60 TABLET | Refills: 1 | Status: SHIPPED | OUTPATIENT
Start: 2020-11-19 | End: 2020-12-15

## 2020-11-19 ASSESSMENT — PAIN SCALES - GENERAL: PAINLEVEL: WORST PAIN (10)

## 2020-11-19 NOTE — TELEPHONE ENCOUNTER
Signed Prescriptions:                        Disp   Refills    topiramate (TOPAMAX) 50 MG tablet          60 tab*1        Sig: Take 2 tablets (100 mg) by mouth 2 times daily  Authorizing Provider: GLO LOZA    Refilled with plan to taper off using these tablets.    RG Puri CNP  [facility]

## 2020-11-19 NOTE — TELEPHONE ENCOUNTER
Received fax from pharmacy requesting refill(s) for topiramate (TOPAMAX) 50 MG tablet     Last refilled on 10/28/2020    Pt last seen on 11/19/2020  Next appt scheduled for none    E-prescribe to:    [id number] - [place], [place] - [address] AT [facility]     Will facilitate refill.

## 2020-11-19 NOTE — PROGRESS NOTES
Visit #:  5 of 8, based on treatment plan    Subjective:  Hilaria Bonner is a 29 year old female who is seen in f/u up for:        Somatic dysfunction of lower extremities  Numbness and tingling of both feet  Somatic dysfunction of upper extremities  Numbness and tingling in both hands.     Since last visit on 11/11/2020,  Hilaria Bonner reports the following changes: Pain immediately after last treatment: 10/10 and their pain level today 10/10.  Hilaria reports that she is feeling about the same. There has been no change in her symptoms though she does not that some of the points used in her feet and hands were having a burning sensation for a day or so.  She feels that are hands are worse but feels this maybe due to the colder weather.  She has been to neurology and they are thinking about changing some of her medication if there is no progress.    Area of chief complaint:  Extra-spinal :  Symptoms are graded at 10/10. The quality is described as numb and tingling.  Motion has been about the same. Patient feels that they have not improved and feel the same.        Objective:  The following was observed:    P: palpatory tenderness none     A: static palpation demonstrates intersegmental asymmetry , ankle    R:     T: The following soft tissue hypotonicities were observed:        Assessment:    Segmental spinal dysfunction/restrictions found at:      Diagnoses:      1. Somatic dysfunction of lower extremities    2. Numbness and tingling of both feet    3. Somatic dysfunction of upper extremities    4. Numbness and tingling in both hands        Patient's condition:  Symptoms are unchanged    Treatment effectiveness:  Symptoms appear to be decreasing      Procedures:  CMT:  NO CMT PER MD ORDER      Modalities:  00913: Acupuncture, for 15 minutes:  Points:  LI4, LI2, SI5, GV20,  LV3, LV4, GB43, TH2, KI6, BL66, SI3, SP5, SP6, LI3, ST44, ST42, LU5  For 15 minutes    Therapeutic procedures:  None      Prognosis:  Guarded    Progress towards Goals: Patient is making progress towards the goal   Patient has not made progress towards goal.    Response to Treatment:   Patient reports no change in symptoms       Recommendations:    Instructions:      Follow-up:   Pt released from care due to no progress

## 2020-11-19 NOTE — PATIENT INSTRUCTIONS
1.  Pain Physical Therapy:     YES   Continue neuro physical therapy and occupational therapy on a regular basis as recommended and as planned.     2.  Pain Psychologist to address relaxation, behavioral change, coping style, and other factors important to improvement.     YES   Continue to see Jasmin Cordoba PsyD, LP as recommended, this should be a priority.    4.  Medication Management:      1. Decrease Cymbalta to 90mg daily. Monitor if sweating improves.    2. Given lack of benefit with Topamax, we will taper off. Reduce to  for 3-5 days. Then 50-50 for 3-5 days. Then to 25-50 for 3-5 days. Then 25-25 for 3-5 days. Then 0-25 for 3-5 days. Then off.     3. Continue Lyrica 200mg three times daily.    4. Consider medical cannabis as an option for pain, you do meet criteria and I could certify you.    5.  Potential procedures: not at this time.     6.  Referrals: discontinue acupuncture as this has not been helpful. Consider the purchase of a TENS unit.   7.  Follow up with RG Puri CNP in 4-6 weeks.

## 2020-11-20 ENCOUNTER — HOME INFUSION (PRE-WILLOW HOME INFUSION) (OUTPATIENT)
Dept: PHARMACY | Facility: CLINIC | Age: 30
End: 2020-11-20

## 2020-11-21 DIAGNOSIS — I26.99 PULMONARY EMBOLISM WITH INFARCTION (H): ICD-10-CM

## 2020-11-22 NOTE — TELEPHONE ENCOUNTER
Requested Prescriptions   Pending Prescriptions Disp Refills     rivaroxaban ANTICOAGULANT (XARELTO ANTICOAGULANT) 20 MG TABS tablet 30 tablet 0     Sig: Take 1 tablet (20 mg) by mouth daily (with dinner)       Direct Oral Anticoagulant Agents Passed - 11/21/2020 11:51 AM        Passed - Normal Platelets on file in past 12 months     Recent Labs   Lab Test 10/22/20  1208                  Passed - Medication is active on med list        Passed - Creatinine Clearance greater than 50 ml/min on file in past 3 mos     No lab results found.          Passed - Serum creatinine less than or equal to 1.4 on file in past 3 mos     Recent Labs   Lab Test 11/10/20  1053 06/14/19  1515 06/14/19  1515   CR 0.69   < >  --    CREAT  --   --  0.9    < > = values in this interval not displayed.       Ok to refill medication if creatinine is low          Passed - Patient is 18 years of age or older        Passed - No active pregnancy on record        Passed - No positive pregnancy test within past 12 months        Passed - Recent (6 mo) or future (30 days) visit within the authorizing provider's specialty           Pulmonary embolism with infarction (H)  On xarelto- massive PE with cardiac arrest 5/2019 - unprovoked     Signed Prescriptions:                        Disp   Refills    rivaroxaban ANTICOAGULANT (XARELTO ANTICOA*90 tab*1        Sig: Take 1 tablet (20 mg) by mouth daily (with dinner)  Authorizing Provider: CHESTER HERNANDEZ  Ordering User: JW DAVE

## 2020-11-23 NOTE — PROGRESS NOTES
This is a recent snapshot of the patient's [facility] medical record.  For current drug dose and complete information and questions, call [phone] or In Basket pool, fv home infusion ([id number])  CSN Number:  [id number]

## 2020-11-24 ENCOUNTER — HOME INFUSION (PRE-WILLOW HOME INFUSION) (OUTPATIENT)
Dept: PHARMACY | Facility: CLINIC | Age: 30
End: 2020-11-24

## 2020-11-24 ENCOUNTER — DOCUMENTATION ONLY (OUTPATIENT)
Dept: PHARMACY | Facility: CLINIC | Age: 30
End: 2020-11-24

## 2020-11-24 NOTE — PROGRESS NOTES
Skilled Nurse visit in the  patient home to administer Privigen.  No recent elevated temperature, fever, chills, productive cough, coughing for 3 weeks or longer or hemoptysis, abnormal vital signs, night sweats, chest pain. No  decrease in your appetite, unexplained weight loss or fatigue.  No other new onset medical symptoms.  Current weight 254.  PIV placed right hand, 1 attempt.  Pre medicated with tylenol, benadryl, and IV hydrocortisone. Infusion completed without complication or reaction. Pt reports therapy is helpful in managing symptoms related to therapy.    Jaqueline Posey RN  [facility]   [facility]   [address] [place] [address]   [email or web address]  [email or web address]   Cell: [phone]  Fax: [phone]   Connect with [facility] on social media.

## 2020-11-25 NOTE — TELEPHONE ENCOUNTER
POTASSIUM CL 20MEQ ER TABLETS  Last Written Prescription Date:  ?  Last Fill Quantity: ?,   # refills: ?  Last Office Visit : 11/18/2020  Future Office visit:  None    Routing refill request to provider for review/approval because:  Drug not on the [facility] or [facility] refill protocol or controlled substance      Sophia Hernandez RN  Central Triage Red Flags/Med Refills

## 2020-11-25 NOTE — PROGRESS NOTES
This is a recent snapshot of the patient's [facility] medical record.  For current drug dose and complete information and questions, call [phone] or In Basket pool, fv home infusion ([id number])  CSN Number:  [id number]

## 2020-11-27 ENCOUNTER — MYC MEDICAL ADVICE (OUTPATIENT)
Dept: PALLIATIVE MEDICINE | Facility: CLINIC | Age: 30
End: 2020-11-27

## 2020-11-30 ENCOUNTER — ANCILLARY PROCEDURE (OUTPATIENT)
Dept: CARDIOLOGY | Facility: CLINIC | Age: 30
End: 2020-11-30
Attending: INTERNAL MEDICINE
Payer: COMMERCIAL

## 2020-11-30 ENCOUNTER — HOSPITAL ENCOUNTER (OUTPATIENT)
Dept: PHYSICAL THERAPY | Facility: CLINIC | Age: 30
Setting detail: THERAPIES SERIES
End: 2020-11-30
Attending: PHYSICIAN ASSISTANT
Payer: COMMERCIAL

## 2020-11-30 ENCOUNTER — HOSPITAL ENCOUNTER (OUTPATIENT)
Dept: OCCUPATIONAL THERAPY | Facility: CLINIC | Age: 30
Setting detail: THERAPIES SERIES
End: 2020-11-30
Attending: PHYSICIAN ASSISTANT
Payer: COMMERCIAL

## 2020-11-30 DIAGNOSIS — R06.02 SHORTNESS OF BREATH: ICD-10-CM

## 2020-11-30 PROCEDURE — 97110 THERAPEUTIC EXERCISES: CPT | Mod: GP | Performed by: PHYSICAL THERAPIST

## 2020-11-30 PROCEDURE — 97112 NEUROMUSCULAR REEDUCATION: CPT | Mod: GP | Performed by: PHYSICAL THERAPIST

## 2020-11-30 PROCEDURE — 97530 THERAPEUTIC ACTIVITIES: CPT | Mod: GO | Performed by: OCCUPATIONAL THERAPIST

## 2020-11-30 PROCEDURE — 93306 TTE W/DOPPLER COMPLETE: CPT | Performed by: INTERNAL MEDICINE

## 2020-11-30 RX ORDER — POTASSIUM CHLORIDE 1500 MG/1
TABLET, EXTENDED RELEASE ORAL
Qty: 2 TABLET | OUTPATIENT
Start: 2020-11-30

## 2020-11-30 NOTE — TELEPHONE ENCOUNTER
IMNEXT message from patient on 11/27/2020 at 1801:  Happy thanksgiaamir Shearer     I discussed the cannabis with my mom.She says I should try it and I'm willing to do so. Can you please refer me to the proper people.If I don't like how it makes me feel I will let you know and we'll go from there. Thank you  ________________________________________    IMNEXT message sent to patient:  Good Morning Hilaria.    Hope you had a good Thanksgiving.  Janki would be the one to certify you for the medical cannabis.  I will forward your note to her so she will be aware you would like to proceed with it.  Once she has completed the process of the certification, we will reach back out to you.  Once she has certified you, the state has up to 30 days to contact you by email. This email will give you the next steps you will need to do to complete the process.  If you do not hear from the state and it has been greater than 30 day, you will need to reach back out to us.  Make sure to check your spam folder because sometimes their response will go to that folder.  [place] [facility] Office of Medical Cannabis contact info: [email or web address]. [phone] (met[place])   [phone] (non-metro).      Take Care,    Stephenie Perez RN  Care Coordinator  [facility]   _________________________________________________    Routing to provider for medical cannabis certification

## 2020-11-30 NOTE — PROGRESS NOTES
[facility]      OUTPATIENT OCCUPATIONAL THERAPY  PLAN OF TREATMENT FOR OUTPATIENT REHABILITATION    Patient's Last Name, First Name, M.I.                YOB: 1990  Hilaria Bonner                        Provider's Name  [facility] Medical Record No.  [id number]                               Onset Date: 4/16/2020   Start of Care Date: 7/28/2020   Type:     ___PT   _X_OT   ___SLP Medical Diagnosis: H/o massive PE and cardiac arrest in 2019, ARAMIS, obesity, COVI-19 in April of 2020.                   OT Diagnosis: decreased I/ADL performance.       _________________________________________________________________________________  Plan of Treatment:    Frequency/Duration: every other week x8 weeks (4 more visits)     Goals:    Goal Identifier 1   Goal Description Pt will reduce 9-hole peg test score with bilateral hands by at least 10 seconds to improve FMC for I/ADLs.   Target Date 11/26/20   Date Met  10/29/20   Progress:     Goal Identifier 2   Goal Description Pt will be able to tolerate using a wash cloth in the shower due to desensitization of skin on hands and feet.    Target Date 01/25/21   Date Met      Progress:     Goal Identifier 3   Goal Description Pt will report adherence to, daily, BUE HEP (strength, endurance and FMC) to address strength and endurance to improve function needed for I/ADLs.    Target Date 01/25/21   Date Met      Progress:     Goal Identifier 4   Goal Description Pt will report making meal involving at least 3 steps (ie chopping vegetables, cooking rice, mixing ingrediiants together) due to improved strength, endurance, FMC and less pain in hands.    Target Date 11/26/20   Date Met  11/30/20   Progress:     Goal Identifier 5   Goal Description Pt will demonstrate 100% accuracy in 2 executive functioning tasks including problem solving, planning and  organizing for successful return to work.     Target Date 01/25/21   Date Met      Progress:       Progress Toward Goals:   Pt has made slow but steady progress. Pt continues to be limited by numbness and tingling in fingers and toes.  Bilateral digits of hand feel edematous but no overt signs of edema.  Numbness, tingling and sensation of edema result in decreased FMC and strength.  Pt with decreased but improving cognition.  Goal 1 &4 met, goals 2,3,5 updated/modified.     Certification date from 12/1/2020 to 1/25/2021.    Chen Oleary OT          I CERTIFY THE NEED FOR THESE SERVICES FURNISHED UNDER        THIS PLAN OF TREATMENT AND WHILE UNDER MY CARE     (Physician co-signature of this document indicates review and certification of the therapy plan).                Referring Provider: Crissy Madrigal PA-C

## 2020-11-30 NOTE — TELEPHONE ENCOUNTER
Certified through the [place] [employer] Medical Cannabis office.    RG Puri CNP [facility]

## 2020-11-30 NOTE — TELEPHONE ENCOUNTER
Medication was prescribed by PCP and has been discontinued in chart in October 2020. Refill denied at this time.

## 2020-11-30 NOTE — TELEPHONE ENCOUNTER
[employer] message sent to patient:  Good Morning Janki Manrique CNP did review your [employer] message and has certified you through the [place] [employer] for the medical cannabis. Please check your emails for their response.  Check your spam folder as well.  Sometimes their e-mail responses will get pushed into that folder.  They do have 30 days to respond, but usually it does not take that long.  If you do not hear back from them after 30 days, please contact us.  I did give you their information in the previous e-mail. You can always contact them as well if you have questions about what is needed next from you to complete the process.  Have a wonderful day.    Stephenie Perez RN  Care Coordinator  [facility]

## 2020-12-01 ENCOUNTER — MYC MEDICAL ADVICE (OUTPATIENT)
Dept: PALLIATIVE MEDICINE | Facility: CLINIC | Age: 30
End: 2020-12-01

## 2020-12-01 ENCOUNTER — APPOINTMENT (OUTPATIENT)
Dept: LAB | Facility: CLINIC | Age: 30
End: 2020-12-01
Attending: PSYCHIATRY & NEUROLOGY
Payer: COMMERCIAL

## 2020-12-01 ENCOUNTER — PATIENT OUTREACH (OUTPATIENT)
Dept: NURSING | Facility: CLINIC | Age: 30
End: 2020-12-01
Payer: COMMERCIAL

## 2020-12-01 NOTE — PROGRESS NOTES
"Clinic Care Coordination Contact    Follow Up Progress Note      Assessment: Outreach to patient.     Patient states she does not know if she is getting better or worse. For the last several days she has tingling in her hands and feet, not burning, but like waking up.     Patient states she does not sleep well. She wakes in the night and feels like her legs are too heave to even move. They feel the same in the morning. After she gets up and moves around they improve.     Patient has stopped the acupuncture. She states she di not find any benefit from it.    Patient states she sees the neurologist again in January. They are going to discuss ongoing infusion treatments (IVIG)She is getting these every 21 days currently. They may stretch them out further. She states she needs to remember to ask if they will do another EMG.   She states she had \"the vibration test\" (tuning fork). She has no feeling below her elbows bilaterally and no feeling below her knees bilaterally. Her right side is more sensitive  Than her left. She states the [employer] doctor did the same test and got the same results.     She states she can not tolerate the texture of a towel on her skin. She needs to use a smooth sponge to wash with. She states lotions and creams also burn her skin.     Patient states she is having night sweats. She has seen endocrinology. They changed some medications which has not helped. She needs to follow up with endo to see if this may be hormonal.     Patient states she is exploring medical cannabis. She is working on the application now. She is willing to try anything that may help. This is from pain management    Patient continues to see psychiatry for anxiety.     Patient is applying for social security disability. She continues to be followed by worker's comp as she contracted covid-19 while working at [employer]. She states she has to check in every 10 days with the  from workers comp. "     Patient states she made her first turkey for Thanksgiving and her Dad told her it was the best one he ever had, so she was very happy    We discussed at length the difficulties covid-19 has caused her and the long term effects she continues to struggle with.    Goals addressed this encounter: No change  Goals Addressed                 This Visit's Progress      Improve chronic symptoms (pt-stated)   20%     Goal Statement: I want the burning pain in my hands to improve    Date Goal set: 6/12/20    Barriers: Polyneuropathy    Strengths: Family support    Date to Achieve By: December 2020    Patient expressed understanding of goal: yes    Action steps to achieve this goal:  1. I will continue Lyrica TID  2. I will use Tramadol as instructed for pain  3. I will participate in outpatient therapies         Intervention/Education provided during outreach: Listening , encouragement, support     Outreach Frequency: monthly    Plan:   Patient will continue to attend multiple appointments.      Care Coordinator will follow up in one month.  Monica Hernandez RN, [facility] RN Coordinator  [facility]   12/1/2020    4:21 PM  [phone]

## 2020-12-02 NOTE — TELEPHONE ENCOUNTER
Thank you, information noted. Agree with plan as laid out by nursing. There are numerous explanations that could cause night sweats and it is good to have this evaluated. We can discuss progress otherwise at follow up visit.      RG Puri [facility]

## 2020-12-02 NOTE — TELEPHONE ENCOUNTER
Message sent this morning Last Read in [employer]   12/2/2020  9:49 AM by Hilaria Bonner  ------------  No appt scheduled yet.     JONATHAN DialloN, RN-BC  Patient Care Supervisor  [facility] [place]

## 2020-12-02 NOTE — TELEPHONE ENCOUNTER
In a response via a separate [employer] encounter dated 12/1, the pt stated that she is working on completing the medical cannabis application so it appears that she received the email from the state program.     ANGE Diallo, RN-BC  Patient Care Supervisor  [facility] [place]

## 2020-12-02 NOTE — TELEPHONE ENCOUNTER
Zia message from patient on 12/1 at 1607:       I sent a message to the endo  to let her know I'm still having night sweats with the decrease of cymbolta I know it's not the correct spelling. Just waiting on a response back to check hormones or what would be the next step. Or maybe you have any recommendations or referrals. Also I'm about to fill the application out for the cannabis forgot to do yesterday. If you have any free time tomorrow feel free to call I have no appointments tomorrow I'm also going to message  about getting another EMG I believe nerve test maybe a week prior to seeing him again to see if there's any changes due to severe tingling and burning normally than what I'm use too. Don't know if it's good or bad but the pain is getting worse.    ----------------  Message sent to pt:    Aleksey Manrique,     Thank you for the update. I am glad to hear that you are reaching out to various providers to move forward with your care. Janki Goodman CNP is out of the office until next Tuesday, 12/8. In review of the last visit, Janki had wanted to connect with you again in 4-6 weeks which would be mid-December, and that is coming up! It would be best to talk with her during an appointment so please call the main clinic number at [phone] to schedule another virtual visit.  I will be sure to have her review this message when she returns.     Take care,     Ivette Hylton, JONATHANN, RN-BC  Patient Care Supervisor  [facility]

## 2020-12-07 NOTE — TELEPHONE ENCOUNTER
[employer] message sent to patient:  Good Morning Hilaria,    Just wanted to reach out to you. It looks like you are due for a follow up with Janki Goodman CNP. To set that appointment up you will need to call our scheduling department at [phone].  Hope you have a good day.      Take Care,    Stephenie Perez RN  Care Coordinator  [facility]

## 2020-12-08 NOTE — TELEPHONE ENCOUNTER
Scheduled 12/21 with provider. Closing    Kait CASSIDY, RN Care Coordinator  [facility] [place] [facility]  Pain Counts include 234 beds at the [facility]

## 2020-12-09 ENCOUNTER — VIRTUAL VISIT (OUTPATIENT)
Dept: PALLIATIVE MEDICINE | Facility: CLINIC | Age: 30
End: 2020-12-09
Payer: COMMERCIAL

## 2020-12-09 DIAGNOSIS — G61.81 CIDP (CHRONIC INFLAMMATORY DEMYELINATING POLYNEUROPATHY) (H): Primary | ICD-10-CM

## 2020-12-09 PROCEDURE — 96159 HLTH BHV IVNTJ INDIV EA ADDL: CPT | Mod: 95 | Performed by: PSYCHOLOGIST

## 2020-12-09 PROCEDURE — 96158 HLTH BHV IVNTJ INDIV 1ST 30: CPT | Mod: 95 | Performed by: PSYCHOLOGIST

## 2020-12-10 ENCOUNTER — MYC MEDICAL ADVICE (OUTPATIENT)
Dept: ENDOCRINOLOGY | Facility: CLINIC | Age: 30
End: 2020-12-10

## 2020-12-10 DIAGNOSIS — R23.2 FLUSHES: ICD-10-CM

## 2020-12-10 DIAGNOSIS — R61 NIGHT SWEATS: Primary | ICD-10-CM

## 2020-12-11 ENCOUNTER — VIRTUAL VISIT (OUTPATIENT)
Dept: CARDIOLOGY | Facility: CLINIC | Age: 30
End: 2020-12-11
Attending: INTERNAL MEDICINE
Payer: COMMERCIAL

## 2020-12-11 ENCOUNTER — HOME INFUSION (PRE-WILLOW HOME INFUSION) (OUTPATIENT)
Dept: PHARMACY | Facility: CLINIC | Age: 30
End: 2020-12-11

## 2020-12-11 DIAGNOSIS — R06.02 SHORTNESS OF BREATH: Primary | ICD-10-CM

## 2020-12-11 DIAGNOSIS — I47.20 VT (VENTRICULAR TACHYCARDIA) (H): ICD-10-CM

## 2020-12-11 DIAGNOSIS — I26.99 PULMONARY EMBOLISM WITH INFARCTION (H): ICD-10-CM

## 2020-12-11 DIAGNOSIS — I46.9 CARDIAC ARREST (H): ICD-10-CM

## 2020-12-11 DIAGNOSIS — I82.4Y9 DEEP VEIN THROMBOSIS (DVT) OF PROXIMAL LOWER EXTREMITY, UNSPECIFIED CHRONICITY, UNSPECIFIED LATERALITY (H): ICD-10-CM

## 2020-12-11 DIAGNOSIS — I72.4 PSEUDOANEURYSM OF RIGHT FEMORAL ARTERY (H): ICD-10-CM

## 2020-12-11 PROCEDURE — 99214 OFFICE O/P EST MOD 30 MIN: CPT | Mod: 95 | Performed by: INTERNAL MEDICINE

## 2020-12-11 NOTE — PROGRESS NOTES
"        Vascular Cardiology Consultation Follow Up      Hilaria Bonner is a 29 year old female who is being evaluated via a billable video visit.      The patient has been notified of following:     \"This video visit will be conducted via a call between you and your physician/provider. We have found that certain health care needs can be provided without the need for an in-person physical exam.  This service lets us provide the care you need with a video conversation.  If a prescription is necessary we can send it directly to your pharmacy.  If lab work is needed we can place an order for that and you can then stop by our lab to have the test done at a later time.    Video visits are billed at different rates depending on your insurance coverage.  Please reach out to your insurance provider with any questions.    If during the course of the call the physician/provider feels a video visit is not appropriate, you will not be charged for this service.\"    Patient has given verbal consent for Video visit? Yes  How would you like to obtain your AVS? Mail a copy  If you are dropped from the video visit, the video invite should be resent to: Text to cell phone: [phone] DOXIMITY  Will anyone else be joining your video visit? No      Pt not able to get vitals.    Review Of Systems  Skin: NEGATIVE  Eyes:Ears/Nose/Throat: NEGATIVE  Respiratory: **lightheadedness  Cardiovascular: **sometimes swelling in hands and legs  Gastrointestinal: **vomiting  Genitourinary:NEGATIVE   Musculoskeletal: NEGATIVE  Neurologic: **numbness in hands and feet; per pt maybe due to COVID after effect  Psychiatric: NEGATIVE  Hematologic/Lymphatic/Immunologic: NEGATIVE  Endocrine:  NEGATIVE    Reviewed by BRIDGETTE Spain, 12/11/20    PROVIDER NOTES:    Hilaria Bonner is a 29 year old female with recent hx of massive PE with PEA arrest who is presenting for evaluation regarding ongoing management for PE.      Briefly in 05/2019 patient had a " hospital cardiac arrest while at the ED, requiring > 50min of CPR with intermittent ROSC. At the time a bedside TTE showed RV dilation for which she received empiric full dose tPA and was subsequently transferred to [facility] for ongoing management. Hospital course was complicated ARAMIS (w/o need of RRT), ischemic colitis and acute blood los requiring blood transfusions. She was discharged on 05/14/2019. Prior to the event patient was driven from [place] to [place] with her mother. At a gas stop in [place] patient felt pain in her legs and few seconds later she collapsed. At the time of EMS arrival patient conscious and answering questions. En route to the hospital patient became more hypotensive  and was transferred to the nearest hospital, at which point she arrested.      Has intermittent bilaterally foot and leg pain, intermittent chest pain both at rest and with exertion but no LE edema. She is compliant with compression hosiery. She underwent right lower extremity DVT study last visit which was negative for DVT but had right PSA, so sent to ER. No intervention needed. Repeat echocardiogram with normal RV function. She did 6mwt and had some SOB. Of note she was switched to rivaroxaban.     She had COVID in April. Interestingly when she was getting over corona she had chest pain and tingling in hands, mouth, hands, calves some of which is still present.       EKG: Sinus rhythm at 74bpm, non specific TW changes     ECHO:   05/14/2019    SUMMARY:   1. Normal global left ventricular systolic function.   2. Left ventricular ejection fraction, by visual estimation, is 70%.   3. Right ventricular size is severely enlarged. Right ventricular global systolic function is severely reduced.   4. Left ventricular diastolic function cannot be asessed due to E and A fusion caused by tachycardia.   5. Normal left ventricular chamber size.  In comparison to the previous echocardiogram(s):  There are no prior studies on  this patient for comparison purposes.     ASSESSMENT:     Hilaria Bonner is a 29 year old female with recent hx of massive PE with PEA arrest who is here for follow up of chronic PE management. Patient still has some residual dyspnea which is probably deconditioning as RV function normalized as did PA pressures, and no residual clot by CTPE to suggest CTEPH or RPVO.      In regards to possible underlining cause of PE, Ms. Bonner has multiple risk factors (smoking, obesity, hormonal birth control and family hx), given the extent of her embolism and  possible familial coagulopathy we would recommend life long anticoagulation at this time.      LE doppler negative for residual DVT, but will need follow up for PSA. COVID in April with some chest pain, arm tingling ongoing.      PLAN:     -- Repeated CTPE was negative for residual clot to suggest RPVO or CTEPH, no need to repeat or obtain VQ scan at this time unless increased PA pressures in future  -- 6MWT done for baseline  -- Repeat US right lower extremity to evaluate for resolution of pseudoaneurysm  -- OK for rivaroxaban switch, treat indefinitely  -- Ziopatch for palpitations showed small run of NSVT - no need to repeat for now  -- EKG to evaluate for tachycardia and chest pain, RN visit + blood pressure and vitals   -- 6 month follow up with me      Yelena Jones MD MSc  [facility]        Orders Placed This Encounter   Procedures     US Lower Ext Arterial Duplex Limited Bilat     Follow-Up with Nurse     Follow-Up with Cardiologist     EKG 12-lead, tracing only     No orders of the defined types were placed in this encounter.    There are no discontinued medications.      Encounter Diagnoses   Name Primary?     Shortness of breath Yes     Cardiac arrest (H)      Pulmonary embolism with infarction (H)      VT (ventricular tachycardia) (H)      Pseudoaneurysm of right femoral artery (H)      Deep vein thrombosis (DVT) of proximal lower extremity,  unspecified chronicity, unspecified laterality (H)        CURRENT MEDICATIONS:  Current Outpatient Medications   Medication Sig Dispense Refill     cholecalciferol 50 MCG (2000 UT) tablet Take 1 tablet by mouth daily 90 tablet 3     diphenhydrAMINE (BENADRYL) 50 MG/ML injection        DULoxetine (CYMBALTA) 30 MG capsule        DULoxetine (CYMBALTA) 60 MG capsule Take 1 capsule (60 mg) by mouth 2 times daily 60 capsule 1     EPINEPHrine (ANY BX GENERIC EQUIV) 0.3 MG/0.3ML injection 2-pack        folic acid (FOLVITE) 1 MG tablet Take 1 tablet (1 mg) by mouth daily 30 tablet 11     hydrOXYzine (ATARAX) 25 MG tablet Take 1-2 tablets (25-50 mg) by mouth every 6 hours as needed for anxiety or other (adjuvant pain) 90 tablet 1     lisinopril (ZESTRIL) 10 MG tablet Take 1 tablet (10 mg) by mouth daily 90 tablet 1     Multiple Vitamins-Minerals (MULTIVITAMIN ADULTS) TABS Take 1 tablet by mouth daily       polyethylene glycol (MIRALAX) 17 GM/SCOOP powder Take 17 g (1 capful) by mouth daily 850 g 3     Pregabalin (LYRICA) 200 MG capsule Take 1 capsule (200 mg) by mouth 3 times daily 90 capsule 3     PRIVIGEN 20 GM/200ML SOLN        PRIVIGEN 40 GM/400ML SOLN        rivaroxaban ANTICOAGULANT (XARELTO ANTICOAGULANT) 20 MG TABS tablet Take 1 tablet (20 mg) by mouth daily (with dinner) 90 tablet 1     SOLU-CORTEF 100 MG injection        vitamin (B COMPLEX) tablet Take 1 tablet by mouth daily 90 tablet 3     vitamin C (ASCORBIC ACID) 1000 MG TABS Take 1 tablet (1,000 mg) by mouth daily 30 tablet 0     vitamin D3 (CHOLECALCIFEROL) 2000 units (50 mcg) tablet Take 1 tablet (2,000 Units) by mouth daily 30 tablet 0     buPROPion (WELLBUTRIN XL) 150 MG 24 hr tablet Take 1 tablet (150 mg) by mouth every morning 30 tablet 1     [facility]       omeprazole (PRILOSEC) 20 MG DR capsule Take 1 capsule (20 mg) by mouth daily 28 capsule 11     ondansetron (ZOFRAN-ODT) 4 MG ODT tab Take 1 tablet (4 mg) by mouth every 8 hours as needed  for nausea 30 tablet 1       ALLERGIES   No Known Allergies    PAST MEDICAL HISTORY:  Past Medical History:   Diagnosis Date     Acute kidney injury (H) 2019     Cardiac arrest (H) 2019     Earache or other ear, nose, or throat complaint 12/15    tyroid     Pulmonary embolus (H) 2019       PAST SURGICAL HISTORY:  Past Surgical History:   Procedure Laterality Date     GYN SURGERY      2 C-sections     KNEE SURGERY  most recently - [id number]    3 surgeries in [place]     LAPAROSCOPIC GASTRIC SLEEVE N/A 3/26/2019    Procedure: Laparoscopic Sleeve Gastrectomy;  Surgeon: Luan Lopez MD;  Location: [facility] OR     ORTHOPEDIC SURGERY      2 knee meniscus surgery       FAMILY HISTORY:  Family History   Problem Relation Age of Onset     Diabetes Father      Hypertension Father      Breast Cancer Sister 26        surgery only     Cancer Sister      Coronary Artery Disease Other         paternal aunt     Thyroid Disease Other         neice     Coronary Artery Disease Other      Cerebrovascular Disease Other      Breast Cancer Sister      Thyroid Disease Other      Cerebrovascular Disease No family hx of        SOCIAL HISTORY:  Social History     Socioeconomic History     Marital status: Single     Spouse name: None     Number of children: 2     Years of education: None     Highest education level: None   Occupational History     None   Social Needs     Financial resource strain: None     Food insecurity     Worry: None     Inability: None     Transportation needs     Medical: None     Non-medical: None   Tobacco Use     Smoking status: Heavy Tobacco Smoker     Packs/day: 0.30     Years: 1.00     Pack years: 0.30     Types: Cigarettes     Start date: 2016     Last attempt to quit: 2018     Years since quittin.9     Smokeless tobacco: Never Used   Substance and Sexual Activity     Alcohol use: Not Currently     Alcohol/week: 0.0 standard drinks     Comment: occasional     Drug use: No     Sexual  activity: Yes     Partners: Male     Birth control/protection: Injection   Lifestyle     Physical activity     Days per week: None     Minutes per session: None     Stress: None   Relationships     Social connections     Talks on phone: None     Gets together: None     Attends Latter day service: None     Active member of club or organization: None     Attends meetings of clubs or organizations: None     Relationship status: None     Intimate partner violence     Fear of current or ex partner: None     Emotionally abused: None     Physically abused: None     Forced sexual activity: None   Other Topics Concern     Parent/sibling w/ CABG, MI or angioplasty before 65F 55M? Not Asked   Social History Narrative     None         Physical Exam:  Vitals: There were no vitals taken for this visit.    Recent Lab Results:  LIPID RESULTS:  Lab Results   Component Value Date    CHOL 200 (H) 12/17/2020    HDL 68 12/17/2020     (H) 12/17/2020    TRIG 102 12/17/2020       LIVER ENZYME RESULTS:  Lab Results   Component Value Date    AST 24 12/17/2020    ALT 22 12/17/2020       CBC RESULTS:  Lab Results   Component Value Date    WBC 5.2 12/17/2020    RBC 3.93 12/17/2020    HGB 13.5 12/17/2020    HCT 41.5 12/17/2020     (H) 12/17/2020    MCH 34.4 (H) 12/17/2020    MCHC 32.5 12/17/2020    RDW 18.5 (H) 12/17/2020     12/17/2020       BMP RESULTS:  Lab Results   Component Value Date     12/17/2020    POTASSIUM 3.7 12/17/2020    CHLORIDE 104 12/17/2020    CO2 33 (H) 12/17/2020    ANIONGAP 3 12/17/2020     (H) 12/17/2020    BUN 7 12/17/2020    CR 0.70 12/17/2020    GFRESTIMATED >90 12/17/2020    GFRESTBLACK >90 12/17/2020    QUINCY 9.2 12/17/2020        A1C RESULTS:  Lab Results   Component Value Date    A1C 5.7 (H) 12/17/2020       INR RESULTS:  Lab Results   Component Value Date    INR 1.22 (H) 10/06/2020    INR 0.95 06/03/2020           CC  Crissy Madrigal, PAYumikoC  [address] [place]  [address]                    Video-Visit Details    Type of service:  Video Visit    Video Time: 13 minutes     Originating Location (pt. Location): Home     Distant Location (provider location):  [facility]     Platform used for Video Visit: [employer]

## 2020-12-11 NOTE — LETTER
"12/11/2020    Crissy Madrigal PA-C  [address]  [facility] [address]    RE: Hilaria R Ha       Dear Colleague,    I had the pleasure of seeing Hilaria Bonner in the [facility].      Hilaria Bonner is a 29 year old female who is being evaluated via a billable video visit.      The patient has been notified of following:     \"This video visit will be conducted via a call between you and your physician/provider. We have found that certain health care needs can be provided without the need for an in-person physical exam.  This service lets us provide the care you need with a video conversation.  If a prescription is necessary we can send it directly to your pharmacy.  If lab work is needed we can place an order for that and you can then stop by our lab to have the test done at a later time.    Video visits are billed at different rates depending on your insurance coverage.  Please reach out to your insurance provider with any questions.    If during the course of the call the physician/provider feels a video visit is not appropriate, you will not be charged for this service.\"    Patient has given verbal consent for Video visit? Yes  How would you like to obtain your AVS? Mail a copy  If you are dropped from the video visit, the video invite should be resent to: Text to cell phone: [phone] DOXIMITY  Will anyone else be joining your video visit? No      Pt not able to get vitals.    Review Of Systems  Skin: NEGATIVE  Eyes:Ears/Nose/Throat: NEGATIVE  Respiratory: **lightheadedness  Cardiovascular: **sometimes swelling in hands and legs  Gastrointestinal: **vomiting  Genitourinary:NEGATIVE   Musculoskeletal: NEGATIVE  Neurologic: **numbness in hands and feet; per pt maybe due to COVID after effect  Psychiatric: NEGATIVE  Hematologic/Lymphatic/Immunologic: NEGATIVE  Endocrine:  NEGATIVE    Reviewed by BRIDGETTE Spain, 12/11/20    PROVIDER NOTES:    Hilaria Bonner " is a 29 year old female with recent hx of massive PE with PEA arrest who is presenting for evaluation regarding ongoing management for PE.      Briefly in 05/2019 patient had a hospital cardiac arrest while at the ED, requiring > 50min of CPR with intermittent ROSC. At the time a bedside TTE showed RV dilation for which she received empiric full dose tPA and was subsequently transferred to [facility] for ongoing management. Hospital course was complicated ARAMIS (w/o need of RRT), ischemic colitis and acute blood los requiring blood transfusions. She was discharged on 05/14/2019. Prior to the event patient was driven from [place] to [place] with her mother. At a gas stop in [place] patient felt pain in her legs and few seconds later she collapsed. At the time of EMS arrival patient conscious and answering questions. En route to the hospital patient became more hypotensive  and was transferred to the nearest hospital, at which point she arrested.      Has intermittent bilaterally foot and leg pain, intermittent chest pain both at rest and with exertion but no LE edema. She is compliant with compression hosiery. She underwent right lower extremity DVT study last visit which was negative for DVT but had right PSA, so sent to ER. No intervention needed. Repeat echocardiogram with normal RV function. She did 6mwt and had some SOB. Of note she was switched to rivaroxaban.     She had COVID in April. Interestingly when she was getting over corona she had chest pain and tingling in hands, mouth, hands, calves some of which is still present.       EKG: Sinus rhythm at 74bpm, non specific TW changes     ECHO:   05/14/2019    SUMMARY:   1. Normal global left ventricular systolic function.   2. Left ventricular ejection fraction, by visual estimation, is 70%.   3. Right ventricular size is severely enlarged. Right ventricular global systolic function is severely reduced.   4. Left ventricular diastolic function cannot be  asessed due to E and A fusion caused by tachycardia.   5. Normal left ventricular chamber size.  In comparison to the previous echocardiogram(s):  There are no prior studies on this patient for comparison purposes.     ASSESSMENT:     Hilaria Bonner is a 29 year old female with recent hx of massive PE with PEA arrest who is here for follow up of chronic PE management. Patient still has some residual dyspnea which is probably deconditioning as RV function normalized as did PA pressures, and no residual clot by CTPE to suggest CTEPH or RPVO.      In regards to possible underlining cause of PE, Ms. Bonner has multiple risk factors (smoking, obesity, hormonal birth control and family hx), given the extent of her embolism and  possible familial coagulopathy we would recommend life long anticoagulation at this time.      LE doppler negative for residual DVT, but will need follow up for PSA. COVID in April with some chest pain, arm tingling ongoing.      PLAN:     -- Repeated CTPE was negative for residual clot to suggest RPVO or CTEPH, no need to repeat or obtain VQ scan at this time unless increased PA pressures in future  -- 6MWT done for baseline  -- Repeat US right lower extremity to evaluate for resolution of pseudoaneurysm  -- OK for rivaroxaban switch, treat indefinitely  -- Ziopatch for palpitations showed small run of NSVT - no need to repeat for now  -- EKG to evaluate for tachycardia and chest pain, RN visit + blood pressure and vitals   -- 6 month follow up with me      Yelena Jones MD MSc  [facility]        Orders Placed This Encounter   Procedures     US Lower Ext Arterial Duplex Limited Bilat     Follow-Up with Nurse     Follow-Up with Cardiologist     EKG 12-lead, tracing only     No orders of the defined types were placed in this encounter.    There are no discontinued medications.      Encounter Diagnoses   Name Primary?     Shortness of breath Yes     Cardiac arrest (H)      Pulmonary  embolism with infarction (H)      VT (ventricular tachycardia) (H)      Pseudoaneurysm of right femoral artery (H)      Deep vein thrombosis (DVT) of proximal lower extremity, unspecified chronicity, unspecified laterality (H)        CURRENT MEDICATIONS:  Current Outpatient Medications   Medication Sig Dispense Refill     cholecalciferol 50 MCG (2000 UT) tablet Take 1 tablet by mouth daily 90 tablet 3     diphenhydrAMINE (BENADRYL) 50 MG/ML injection        DULoxetine (CYMBALTA) 30 MG capsule        DULoxetine (CYMBALTA) 60 MG capsule Take 1 capsule (60 mg) by mouth 2 times daily 60 capsule 1     EPINEPHrine (ANY BX GENERIC EQUIV) 0.3 MG/0.3ML injection 2-pack        folic acid (FOLVITE) 1 MG tablet Take 1 tablet (1 mg) by mouth daily 30 tablet 11     hydrOXYzine (ATARAX) 25 MG tablet Take 1-2 tablets (25-50 mg) by mouth every 6 hours as needed for anxiety or other (adjuvant pain) 90 tablet 1     lisinopril (ZESTRIL) 10 MG tablet Take 1 tablet (10 mg) by mouth daily 90 tablet 1     Multiple Vitamins-Minerals (MULTIVITAMIN ADULTS) TABS Take 1 tablet by mouth daily       polyethylene glycol (MIRALAX) 17 GM/SCOOP powder Take 17 g (1 capful) by mouth daily 850 g 3     Pregabalin (LYRICA) 200 MG capsule Take 1 capsule (200 mg) by mouth 3 times daily 90 capsule 3     PRIVIGEN 20 GM/200ML SOLN        PRIVIGEN 40 GM/400ML SOLN        rivaroxaban ANTICOAGULANT (XARELTO ANTICOAGULANT) 20 MG TABS tablet Take 1 tablet (20 mg) by mouth daily (with dinner) 90 tablet 1     SOLU-CORTEF 100 MG injection        vitamin (B COMPLEX) tablet Take 1 tablet by mouth daily 90 tablet 3     vitamin C (ASCORBIC ACID) 1000 MG TABS Take 1 tablet (1,000 mg) by mouth daily 30 tablet 0     vitamin D3 (CHOLECALCIFEROL) 2000 units (50 mcg) tablet Take 1 tablet (2,000 Units) by mouth daily 30 tablet 0     buPROPion (WELLBUTRIN XL) 150 MG 24 hr tablet Take 1 tablet (150 mg) by mouth every morning 30 tablet 1     [facility] [employer] [facility] CANABIS       omeprazole  (PRILOSEC) 20 MG DR capsule Take 1 capsule (20 mg) by mouth daily 28 capsule 11     ondansetron (ZOFRAN-ODT) 4 MG ODT tab Take 1 tablet (4 mg) by mouth every 8 hours as needed for nausea 30 tablet 1       ALLERGIES   No Known Allergies    PAST MEDICAL HISTORY:  Past Medical History:   Diagnosis Date     Acute kidney injury (H) 2019     Cardiac arrest (H) 2019     Earache or other ear, nose, or throat complaint 12/15    tyroid     Pulmonary embolus (H) 2019       PAST SURGICAL HISTORY:  Past Surgical History:   Procedure Laterality Date     GYN SURGERY      2 C-sections     KNEE SURGERY  most recently - [id number]    3 surgeries in [place]     LAPAROSCOPIC GASTRIC SLEEVE N/A 3/26/2019    Procedure: Laparoscopic Sleeve Gastrectomy;  Surgeon: Luan Lopez MD;  Location: [facility] OR     ORTHOPEDIC SURGERY      2 knee meniscus surgery       FAMILY HISTORY:  Family History   Problem Relation Age of Onset     Diabetes Father      Hypertension Father      Breast Cancer Sister 26        surgery only     Cancer Sister      Coronary Artery Disease Other         paternal aunt     Thyroid Disease Other         neice     Coronary Artery Disease Other      Cerebrovascular Disease Other      Breast Cancer Sister      Thyroid Disease Other      Cerebrovascular Disease No family hx of        SOCIAL HISTORY:  Social History     Socioeconomic History     Marital status: Single     Spouse name: None     Number of children: 2     Years of education: None     Highest education level: None   Occupational History     None   Social Needs     Financial resource strain: None     Food insecurity     Worry: None     Inability: None     Transportation needs     Medical: None     Non-medical: None   Tobacco Use     Smoking status: Heavy Tobacco Smoker     Packs/day: 0.30     Years: 1.00     Pack years: 0.30     Types: Cigarettes     Start date: 2016     Last attempt to quit: 2018     Years since quittin.9      Smokeless tobacco: Never Used   Substance and Sexual Activity     Alcohol use: Not Currently     Alcohol/week: 0.0 standard drinks     Comment: occasional     Drug use: No     Sexual activity: Yes     Partners: Male     Birth control/protection: Injection   Lifestyle     Physical activity     Days per week: None     Minutes per session: None     Stress: None   Relationships     Social connections     Talks on phone: None     Gets together: None     Attends Gnosticist service: None     Active member of club or organization: None     Attends meetings of clubs or organizations: None     Relationship status: None     Intimate partner violence     Fear of current or ex partner: None     Emotionally abused: None     Physically abused: None     Forced sexual activity: None   Other Topics Concern     Parent/sibling w/ CABG, MI or angioplasty before 65F 55M? Not Asked   Social History Narrative     None         Physical Exam:  Vitals: There were no vitals taken for this visit.    Recent Lab Results:  LIPID RESULTS:  Lab Results   Component Value Date    CHOL 200 (H) 12/17/2020    HDL 68 12/17/2020     (H) 12/17/2020    TRIG 102 12/17/2020       LIVER ENZYME RESULTS:  Lab Results   Component Value Date    AST 24 12/17/2020    ALT 22 12/17/2020       CBC RESULTS:  Lab Results   Component Value Date    WBC 5.2 12/17/2020    RBC 3.93 12/17/2020    HGB 13.5 12/17/2020    HCT 41.5 12/17/2020     (H) 12/17/2020    MCH 34.4 (H) 12/17/2020    MCHC 32.5 12/17/2020    RDW 18.5 (H) 12/17/2020     12/17/2020       BMP RESULTS:  Lab Results   Component Value Date     12/17/2020    POTASSIUM 3.7 12/17/2020    CHLORIDE 104 12/17/2020    CO2 33 (H) 12/17/2020    ANIONGAP 3 12/17/2020     (H) 12/17/2020    BUN 7 12/17/2020    CR 0.70 12/17/2020    GFRESTIMATED >90 12/17/2020    GFRESTBLACK >90 12/17/2020    QUINCY 9.2 12/17/2020        A1C RESULTS:  Lab Results   Component Value Date    A1C 5.7 (H) 12/17/2020        INR RESULTS:  Lab Results   Component Value Date    INR 1.22 (H) 10/06/2020    INR 0.95 06/03/2020         Video-Visit Details    Type of service:  Video Visit    Video Time: 13 minutes     Originating Location (pt. Location): Home     Distant Location (provider location):  [facility]     Platform used for Video Visit: Doximselena          Thank you for allowing me to participate in the care of your patient.    Sincerely,     Yelena Jones MD     [facility]

## 2020-12-14 ENCOUNTER — VIRTUAL VISIT (OUTPATIENT)
Dept: ENDOCRINOLOGY | Facility: CLINIC | Age: 30
End: 2020-12-14
Payer: COMMERCIAL

## 2020-12-14 ENCOUNTER — MYC MEDICAL ADVICE (OUTPATIENT)
Dept: CARDIOLOGY | Facility: CLINIC | Age: 30
End: 2020-12-14

## 2020-12-14 VITALS — BODY MASS INDEX: 38.45 KG/M2 | HEIGHT: 67 IN | WEIGHT: 245 LBS

## 2020-12-14 DIAGNOSIS — Z98.84 S/P LAPAROSCOPIC SLEEVE GASTRECTOMY: Primary | ICD-10-CM

## 2020-12-14 DIAGNOSIS — E66.01 MORBID (SEVERE) OBESITY DUE TO EXCESS CALORIES (H): ICD-10-CM

## 2020-12-14 PROCEDURE — 99214 OFFICE O/P EST MOD 30 MIN: CPT | Mod: 95 | Performed by: PHYSICIAN ASSISTANT

## 2020-12-14 RX ORDER — ONDANSETRON 4 MG/1
4 TABLET, ORALLY DISINTEGRATING ORAL EVERY 8 HOURS PRN
Qty: 30 TABLET | Refills: 1 | Status: SHIPPED | OUTPATIENT
Start: 2020-12-14 | End: 2021-11-01

## 2020-12-14 ASSESSMENT — MIFFLIN-ST. JEOR: SCORE: 1868.94

## 2020-12-14 ASSESSMENT — PAIN SCALES - GENERAL: PAINLEVEL: NO PAIN (0)

## 2020-12-14 NOTE — NURSING NOTE
"Chief Complaint   Patient presents with     Follow Up     return bariatric       Vitals:    12/14/20 0712   Weight: 111.1 kg (245 lb)   Height: 1.702 m (5' 7\")       Body mass index is 38.37 kg/m .                       "

## 2020-12-14 NOTE — LETTER
"2020       RE: Hilaria Bonner  [address]  [address]  [facility] [address]     Dear Colleague,    Thank you for referring your patient, Hilaria Bonner, to the [facility] [place] [facility]. Please see a copy of my visit note below.    Hilaria Bonner is a 29 year old female who is being evaluated via a billable telephone visit.      The patient has been notified of following:     \"This telephone visit will be conducted via a call between you and your physician/provider. We have found that certain health care needs can be provided without the need for a physical exam.  This service lets us provide the care you need with a short phone conversation.  If a prescription is necessary we can send it directly to your pharmacy.  If lab work is needed we can place an order for that and you can then stop by our lab to have the test done at a later time.    Telephone visits are billed at different rates depending on your insurance coverage. During this emergency period, for some insurers they may be billed the same as an in-person visit.  Please reach out to your insurance provider with any questions.    If during the course of the call the physician/provider feels a telephone visit is not appropriate, you will not be charged for this service.\"    Patient has given verbal consent for Telephone visit?  Yes    What phone number would you like to be contacted at? [phone]    How would you like to obtain your AVS? [employer]    Phone call duration: 21 minutes    Joanne Sterling PA-C    During this virtual visit the patient is located in [place], patient verifies this as the location during the entirety of this visit.     Stopped taking topiramate 2 months ago.  Wasn't effective for nerve pain.        Return Bariatric Surgery Note    RE: Hilaria Bonner  MR#: [id number]  : 1990  VISIT DATE: Dec 14, 2020    Dear Rohan, " Crissy DIAZ,    I had the pleasure of seeing your patient, Hilaria Bonner, in my post-bariatric surgery assessment clinic.    CHIEF COMPLAINT: Post-bariatric surgery follow-up    HISTORY OF PRESENT ILLNESS:  Questions Regarding Prior Weight Loss Surgery Reviewed With Patient 12/14/2020   I had the following weight loss procedure: Sleeve Gastrectomy   What year was your surgery? 2019   How has your weight changed since your last visit? I have lost weight   Are you currently taking any weight loss medications? No   Do you currently have any of the following: Heartburn, acid reflux, or GERD (acid reflux disease)?   Have you been to the Emergency room since your last visit with us? Yes   Were you in the hospital since your last visit with us? Yes   Do you have any concerns today? throwing up after eating     Sleeve March 2019 Dr Jessica Casey PE and Cardiac arrest 1 mo after surgery while driving in [place]  Vomiting 2-3 times per week.  She doesn't tolerate red meat   She takes zofran for nausea  GERD: she was taking omeprazole after surgery but it was stopped in July when she was hospitalized    Covid April 2019 and nerve pain since.  Seeing neurologist again soon  Supposed to call to schedule LE US to eval for DVT    Weight History:     12/14/2020   What is your highest lifetime weight? 330   What is your lowest weight since surgery? (In pounds) 245     Initial Weight (lbs): 315 lbs  Weight: 111.1 kg (245 lb)(pt reported)  Last Visits Weight: 106.6 kg (235 lb)  Cumulative weight loss (lbs): 70  Weight Loss Percentage: 22.22%    Questions Regarding Co-Morbidities and Health Concerns Reviewed With Patient 12/14/2020   Pre-diabetes: Never   Diabetes II: Never   High Blood Pressure: Never   High cholesterol: Never   Heartburn/Reflux: Worsened   Are you taking daily medication for heartburn, acid reflux, or GERD (acid reflux disease)? No   Sleep apnea: Never   PCOS: Never   Back pain: Never   Joint pain: Never   Lower leg  swelling: Stayed the same       Eating Habits 12/14/2020   How many meals do you eat per day? 5 or more   Do you snack between meals? Yes   How much food are you eating at each meal? Less than 1/2 cup   Are you able to separate your meals and liquids by at least 30 minutes? Yes   Are you able to avoid liquid calories? Yes       Exercise Questions Reviewed With Patient 12/14/2020   How often do you exercise? Daily   What is the duration of your exercise (in minutes)? 60+ Minutes   What types of exercise do you do? walking   What keeps you from being more active?  I am as active as I can possbily be       Social History:      12/14/2020   Are you smoking? No   Are you drinking alcohol? No       Medications:  Current Outpatient Medications   Medication     cholecalciferol 50 MCG (2000 UT) tablet     diphenhydrAMINE (BENADRYL) 50 MG/ML injection     DULoxetine (CYMBALTA) 30 MG capsule     DULoxetine (CYMBALTA) 60 MG capsule     EPINEPHrine (ANY BX GENERIC EQUIV) 0.3 MG/0.3ML injection 2-pack     folic acid (FOLVITE) 1 MG tablet     hydrOXYzine (ATARAX) 25 MG tablet     lisinopril (ZESTRIL) 10 MG tablet     Multiple Vitamins-Minerals (MULTIVITAMIN ADULTS) TABS     ondansetron (ZOFRAN-ODT) 4 MG ODT tab     polyethylene glycol (MIRALAX) 17 GM/SCOOP powder     Pregabalin (LYRICA) 200 MG capsule     PRIVIGEN 20 GM/200ML SOLN     PRIVIGEN 40 GM/400ML SOLN     rivaroxaban ANTICOAGULANT (XARELTO ANTICOAGULANT) 20 MG TABS tablet     SOLU-CORTEF 100 MG injection     vitamin (B COMPLEX) tablet     vitamin C (ASCORBIC ACID) 1000 MG TABS     vitamin D3 (CHOLECALCIFEROL) 2000 units (50 mcg) tablet     capsaicin (ZOSTRIX) 0.025 % external cream     diclofenac (VOLTAREN) 1 % topical gel     HYDROcodone-acetaminophen (NORCO) 5-325 MG tablet     ibuprofen (ADVIL/MOTRIN) 600 MG tablet     melatonin 1 MG TABS tablet     phenylephrine-shark liver oil-mineral oil-petrolatum (PREPARATION H) 0.25-14-74.9 % rectal ointment     topiramate  "(TOPAMAX) 50 MG tablet     Current Facility-Administered Medications   Medication     cyanocobalamin injection 1,000 mcg     medroxyPROGESTERone (DEPO-PROVERA) syringe 150 mg         12/14/2020   Do you avoid NSAIDs such as (Ibuprofen, Aleve, Naproxen, Advil)?   Yes       ROS:  GI:      12/14/2020   Vomiting: Yes   Diarrhea: No   Constipation: No   Swallowing trouble: No   Abdominal pain: No   Heartburn: Yes   Rash in skin folds: No   Depression: Yes   Stress urinary incontinence No     Skin:   BAR RBS ROS - SKIN 12/14/2020   Rash in skin folds: No     Psych:      12/14/2020   Depression: Yes   Anxiety: Yes     Female Only:   BAR RBS ROS - FEMALE ONLY 12/14/2020   Female only: Birth control       LABS/IMAGING/MEDICAL RECORDS REVIEW:   Results for RADHA JOHANSEN (MRN [id number]) as of 12/14/2020 12:54   Ref. Range 11/10/2020 10:53   Sodium Latest Ref Range: 133 - 144 mmol/L 137   Potassium Latest Ref Range: 3.4 - 5.3 mmol/L 3.9   Chloride Latest Ref Range: 94 - 109 mmol/L 104   Carbon Dioxide Latest Ref Range: 20 - 32 mmol/L 24   Urea Nitrogen Latest Ref Range: 7 - 30 mg/dL 9   Creatinine Latest Ref Range: 0.52 - 1.04 mg/dL 0.69   GFR Estimate Latest Ref Range: >60 mL/min/1.73_m2 >90   GFR Estimate If Black Latest Ref Range: >60 mL/min/1.73_m2 >90   Calcium Latest Ref Range: 8.5 - 10.1 mg/dL 8.9   Anion Gap Latest Ref Range: 3 - 14 mmol/L 9   Glucose Latest Ref Range: 70 - 99 mg/dL 114 (H)         PHYSICAL EXAMINATION:  Ht 1.702 m (5' 7\")   Wt 111.1 kg (245 lb)   BMI 38.37 kg/m         ASSESSMENT AND PLAN:      1. 1 years status laparoscopic gastric sleeve with saddle PE and cardiac arrest 1 mo postop. She has been struggling with medical issues since having Covid in April 2020.  She is seeing neurologist for nerve issues.  2. Morbid Obesity current BMI: Body mass index is 38.37 kg/m .  3. Post surgical malabsorption:   Labs ordered per protocol.   Follow food plan per dietitian recommendations.   Continue " taking recommended post-op vitamins.  4. Return to clinic in 2 months.  5. Topiramate: no longer taking. Stopped by patient and other providers due to not being effective for weight loss or nerve pain.  It may have been helping to stablize weight as she has had 10 lb wt gain since stopping.  Will not restart now due to possibly worsening tingling she is experiencing in her hands and feet.  6. Continue to follow up with neurologist  7. GERD worse since stopping omeprazole while in the hospital.  Will restart  8. Nutrition: she is taking vitamins regularly now.  Needs to follow up with RD soon. Continue B12 injections monthly.    See Joanne Sterling in 2 months return bariatric surgery    Sincerely,    Joanne Sterling PA-C

## 2020-12-14 NOTE — TELEPHONE ENCOUNTER
[employer] message received from patient. Will route to Dr. Jones for review.      Last OV 12/11/20:  PLAN:     -- Repeat CTPE was negative for residual clot to suggest RPVO or CTEPH, no need to repeat or obtain VQ scan at this time unless increased PA pressures in future  -- 6MWT done for baseline  -- Repeat US right lower extremity to evaluate for resolution of pseudoaneurysm   -- OK for rivaroxaban switch however with BMI there may be less therapeutic benefit (efficacy decreases in BMI > 40) will monitor closely   -- Ziopatch for palpitations showed small run of NSVT   -- Follow up in 6 months at [facility] RN visit with      Yelena Jones MD MSc  Division of Cardiology      TEN Bravo December 14, 2020 9:08 AM
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
## 2020-12-14 NOTE — PROGRESS NOTES
This is a recent snapshot of the patient's [facility] medical record.  For current drug dose and complete information and questions, call [phone] or In [facility] pool, fv home infusion ([id number])  CSN Number:  [id number]

## 2020-12-15 ENCOUNTER — MYC MEDICAL ADVICE (OUTPATIENT)
Dept: CARDIOLOGY | Facility: CLINIC | Age: 30
End: 2020-12-15

## 2020-12-15 ENCOUNTER — VIRTUAL VISIT (OUTPATIENT)
Dept: FAMILY MEDICINE | Facility: CLINIC | Age: 30
End: 2020-12-15
Payer: COMMERCIAL

## 2020-12-15 DIAGNOSIS — R06.02 SHORTNESS OF BREATH: Primary | ICD-10-CM

## 2020-12-15 DIAGNOSIS — F32.1 MODERATE MAJOR DEPRESSION (H): Primary | ICD-10-CM

## 2020-12-15 DIAGNOSIS — I72.4 PSEUDOANEURYSM OF RIGHT FEMORAL ARTERY (H): Primary | ICD-10-CM

## 2020-12-15 DIAGNOSIS — F41.9 ANXIETY: ICD-10-CM

## 2020-12-15 PROCEDURE — 99214 OFFICE O/P EST MOD 30 MIN: CPT | Mod: 95 | Performed by: PHYSICIAN ASSISTANT

## 2020-12-15 PROCEDURE — 96127 BRIEF EMOTIONAL/BEHAV ASSMT: CPT | Performed by: PHYSICIAN ASSISTANT

## 2020-12-15 RX ORDER — BUPROPION HYDROCHLORIDE 150 MG/1
150 TABLET ORAL EVERY MORNING
Qty: 30 TABLET | Refills: 1 | Status: SHIPPED | OUTPATIENT
Start: 2020-12-15 | End: 2020-12-21 | Stop reason: SINTOL

## 2020-12-15 ASSESSMENT — ANXIETY QUESTIONNAIRES
7. FEELING AFRAID AS IF SOMETHING AWFUL MIGHT HAPPEN: SEVERAL DAYS
6. BECOMING EASILY ANNOYED OR IRRITABLE: SEVERAL DAYS
1. FEELING NERVOUS, ANXIOUS, OR ON EDGE: NEARLY EVERY DAY
1. FEELING NERVOUS, ANXIOUS, OR ON EDGE: MORE THAN HALF THE DAYS
IF YOU CHECKED OFF ANY PROBLEMS ON THIS QUESTIONNAIRE, HOW DIFFICULT HAVE THESE PROBLEMS MADE IT FOR YOU TO DO YOUR WORK, TAKE CARE OF THINGS AT HOME, OR GET ALONG WITH OTHER PEOPLE: VERY DIFFICULT
6. BECOMING EASILY ANNOYED OR IRRITABLE: SEVERAL DAYS
3. WORRYING TOO MUCH ABOUT DIFFERENT THINGS: NEARLY EVERY DAY
IF YOU CHECKED OFF ANY PROBLEMS ON THIS QUESTIONNAIRE, HOW DIFFICULT HAVE THESE PROBLEMS MADE IT FOR YOU TO DO YOUR WORK, TAKE CARE OF THINGS AT HOME, OR GET ALONG WITH OTHER PEOPLE: VERY DIFFICULT
5. BEING SO RESTLESS THAT IT IS HARD TO SIT STILL: NOT AT ALL
7. FEELING AFRAID AS IF SOMETHING AWFUL MIGHT HAPPEN: MORE THAN HALF THE DAYS
2. NOT BEING ABLE TO STOP OR CONTROL WORRYING: SEVERAL DAYS
2. NOT BEING ABLE TO STOP OR CONTROL WORRYING: NEARLY EVERY DAY
3. WORRYING TOO MUCH ABOUT DIFFERENT THINGS: SEVERAL DAYS
GAD7 TOTAL SCORE: 8

## 2020-12-15 ASSESSMENT — PATIENT HEALTH QUESTIONNAIRE - PHQ9
5. POOR APPETITE OR OVEREATING: SEVERAL DAYS
5. POOR APPETITE OR OVEREATING: NEARLY EVERY DAY
SUM OF ALL RESPONSES TO PHQ QUESTIONS 1-9: 21

## 2020-12-15 NOTE — TELEPHONE ENCOUNTER
29-year-old female patient presenting with night sweats and hot flash of about 2 months duration.  Symptoms happen only at night and wakes her up to 5-6 times at night intermittently. Doesn't happen every night.        Reviewed previously done lab results imaging studies including MRI of the brain and CT abdomen pelvis.  Imaging studies revealed normal adrenal gland and pituitary gland which makes conditions like pheochromocytoma or prolactin producing tumors less likely.  Work-up for malignancy or infectious causes has been negative. Hyperthyroidism has been ruled out.      Depo-Provera inhibits pituitary gonadotropins and this medication has a side effect of hot flash up to 30% of the time and could be the possible underlying cause for her symptoms.   Last injection was 9/30/20.     Called patient today at 5:30 PM.    Discussed the above possibility and considering other form of contraception including IUD.  Patient is not interested to pursue IUD at this time as it was uncomfortable previously.    Checking hypothalamus-pituitary-gonadal axis while patient is on hormonal replacement therapy will give unreliable and uninterpretable results.  At the same time, taking patient off of Depo-Provera for prolonged good period of time has a risk of unwanted pregnancy.  Considering all these factors decision was made to pursue FSH, estradiol, prolactin and IGF-I levels in order to assess underlying cause for night sweats and hot flashes/reduced libido and vaginal dryness.  I have advised patient to schedule her Depo-Provera as planned.  Her next injection will be end of December 2020.  We will contact patient after results.  Total telephone time 11 minutes.

## 2020-12-15 NOTE — PROGRESS NOTES
"Hilaria Bonner is a 29 year old female who is being evaluated via a billable telephone visit.      The patient has been notified of following:     \"This telephone visit will be conducted via a call between you and your physician/provider. We have found that certain health care needs can be provided without the need for a physical exam.  This service lets us provide the care you need with a short phone conversation.  If a prescription is necessary we can send it directly to your pharmacy.  If lab work is needed we can place an order for that and you can then stop by our lab to have the test done at a later time.    Telephone visits are billed at different rates depending on your insurance coverage. During this emergency period, for some insurers they may be billed the same as an in-person visit.  Please reach out to your insurance provider with any questions.    If during the course of the call the physician/provider feels a telephone visit is not appropriate, you will not be charged for this service.\"    Patient has given verbal consent for Telephone visit?  Yes    What phone number would you like to be contacted at? [phone]    How would you like to obtain your AVS? Ferdinand Wilson, [facility]      Recommendations at last vist on 11/19/2020:   1.  Pain Physical Therapy:     YES   Encouraged Hilaria continue neuro physical therapy and occupational therapy on a regular basis as recommended and as planned.     2.  Pain Psychologist to address relaxation, behavioral change, coping style, and other factors important to improvement.     YES  Hilaria has only had one visit of pain psychology but thinks this will be helpful. Recommended she continue to see Jasmin Cordoba PsyD, LP per her direction, this should be a priority.    4.  Medication Management:      1. Increase of Cymbalta to 120mg does not seem to have provided pain or mood benefit. SHe has had increased night " "sweats over the last month, may be related. She will decrease Cymbalta to 90mg daily and monitor if sweating improves.    2. Given lack of benefit with initiation and titration of Topamax, we will taper off. Directions provided to taper off.     3. Continue Lyrica 200mg TID.     4. We discussed that Jony meets criteria for intractable pain and I could certify her for medical cannabis. She isn't sure how she feels about this and would like to discuss with her mother. She will let me know if interested.    5.  Potential procedures: not at this time.     6.  Referrals: discontinue acupuncture as this has not been helpful. Did encouraged she consider the purchase of a TENS unit.   7.  Follow up with RG Puri CNP in 4-6 weeks.       Additional provider notes:  -Her pain is somewhat worse than it was at last visit.  -She notes specifically the numbness and tingling in her hands has been worsening. She has also been having more pain in her legs as well, which seems to be a regression in her symptoms. She wonders if this could be due to cold weather.   -She decreased Cymbalta after last visit and was at 90mg for about a month without improvement in sweating. She decided to increase to max dose 120mg daily a couple weeks ago, \"I just thought it was best.\"    -She tapered off of Topamax without any issue.   -She had follow up with her primary care provider and was started on Wellbutrin for management of depression/anxiety. After starting she felt unsteady/woozy and had low BP so discontinued per her primary care provider. She recommended follow up with psychiatry.   -She reached out after last visit stating that she was interested in medical cannabis certification and as she was meets criteria for intractable pain, was certified. She went to [employer] and started using a starter kit. She has been using CBD dominant capsules/vapor, CBD/THC capsules/pills, and THC dominant capsules/vapor. She has been using " "the products intermittently, has had \"a head high\" sensation that she doesn't like. She has been sleeping better but denies pain benefit. She notes her financial situation limits the use.  -She had another appointment with Jasmin Prince PsyD, LP. She isn't sure how helpful this resource is, doesn't always understand the questions, but is open to it.   -She continues evaluation with neurology, has a follow up EMG next month to reevaluate progress.   -She continues neuro physical therapy and occupational therapy. She forgot to purchase a TENS unit but is interested in this.       Current pain medications:              Lyrica 200mg TID- [facility], \"a little bit\"               Cymbalta 120mg daily- ?, we tried decreasing to 90mg daily without improvement in sweating so recently increased to 120mg daily              Hydroxyzine 25-50mg prn- H for anxiety, ran out of              Folate 1mg daily    Medical cannabis (starter kit- CBD dominant capsules/vapor, CBD/THC capsules/pills, and THC dominant capsules/vapor)     1. Previous Pain Relevant Medications:  NOTE: This medication information taken from patient's intake form, not medical records.               Opiates: Tramadol- [facility], oxycodone- ?/[facility]              NSAIDS: doesn't take anti-inflammatories due to gastric bypass              Muscle Relaxants: tizanidine- H for sleep only              Anti-migraine mediations: no              Anti-depressants: amitriptyline (up to 75mg)- ?, \"it put me to sleep\", Cymbalta- ?              Sleep aids: no              Anxiolytics: hydroxyzine- H               Neuropathics: Lyrica- [facility], gabapentin (started on for numbness in toes and migraines after cardiac arrest)- SE, fatigue, Topamax- H for weight loss, NH for pain              Topicals: gabapentin cream- NH/[facility], lidocaine cream- NH, W, burning, capsaicin cream- NH, W, burning              Other medications not covered above: Tylenol- NH     2. Physical Therapy: going to neuro PT and OT "   3. Pain Psychology: yes Jasmin Prince PsyD, LP x2 visit   4. Surgery: gastric bypass March 2019  5. Injections: no  6. Chiropractic: no  7. Acupuncture: yes x4 sessions with José Calderon [facility]  8. TENS Unit: no, interested in one    Assessment:  Hilaria Bonner is a 29 year old female with a past medical history significant for PE with associated PEA arrest, morbid obesity s/p sleeve gastrectomy, pancreatitis, and recent COVID 19 who presents with complaints of upper and lower extremity pain.      1. 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   2. Mental Health - the patient's mental health concerns, specifically anxiety, affect her experience of pain and contribute to her clinically significant distress.     1. CIDP (chronic inflammatory demyelinating polyneuropathy) (H)    2. Chronic pain syndrome           Plan:     1.  Pain Physical Therapy:    Encouraged Hilaria continue neuro physical therapy and occupational therapy on a regular basis as recommended and as planned. Could consider pain physical therapy once completed.    2.  Pain Psychologist to address relaxation, behavioral change, coping style, and other factors important to improvement.      Encouraged she continue visits with Jasmin Prince PsyD, LP. I am hopeful these resources will provide some benefit.    3.  Medication Management:     1. Unclear of degree of benefit from Cymbalta. She was at 120mg daily for about 6 weeks and we then decreased it due to concern for sweating. As sweating did not improve, Hilaria increased on her back to 120mg daily, was hopeful that this would help with pain. We discussed in the future she should not make medication changes without reaching out. Advised she continue Cymbalta 120mg daily for now. If we do not see significant pain or mood benefit in the next two months, we will likely  taper off.      2. Continue Lyrica 200mg TID- max dose. We discussed that there are few medications left that I would recommend starting. May consider a cross taper to Effexor if interested. Amitriptyline was not helpful. Muscle relaxants have not been helpful.      3. Continue medical cannabis as able/finances allow. I would suggest reaching out to the pharmacist with the dispensary to see which products or adjustments they may make.    4.  Potential procedures: not at this time.     5.  Referrals: continue evaluation with neurology as planned- has repeat EMGs scheduled in January. Again recommended the purchase of a TENS unit, she is interested.    6.  Follow up with Dr. Chester while I am on maternity leave, in 6-8 weeks. Unfortunately we have not made significant progress since referral in July. We discussed this today. If not making progress in another couple of months unfortunately may refer her back to referring provider for ongoing management.       Phone call duration: 30 minutes    RG Ibrahim CNP

## 2020-12-15 NOTE — TELEPHONE ENCOUNTER
[employer] message received. RN will reply to patient via [employer] and send to Dr. Arthur for review to inquire if ziopatch needing repeated (if so how many days) or if EKG needing to be ordered as patient states below.     I got the ultrasound scheduled I thought  wanted to do both legs and not just the right...they still didn't get the order for the EKG if I'm not mistaken...she said I would just need a nurse visit for that.      12/11/20 Dr. Jones visit note  ASSESSMENT:      Hilaria Bonner is a 29 year old female with recent hx of massive PE with PEA arrest who is here for follow up of chronic PE management. Patient still has some residual dyspnea which is probably deconditioning as RV function normalized as did PA pressures, and no residual clot by CTPE to suggest CTEPH or RPVO.      In regards to possible underlining cause of PE, Ms. Bonner has multiple risk factors (smoking, obesity, hormonal birth control and family hx), given the extent of her embolism and  possible familial coagulopathy we would recommend life long anticoagulation at this time.      LE doppler negative for residual DVT, but will need follow up for PSA. Has palpitations lasting up to one hour, very strong. Would like to evaluate further.      PLAN:     -- Repeat CTPE was negative for residual clot to suggest RPVO or CTEPH, no need to repeat or obtain VQ scan at this time unless increased PA pressures in future  -- 6MWT done for baseline  -- Repeat US right lower extremity to evaluate for resolution of pseudoaneurysm   -- OK for rivaroxaban switch however with BMI there may be less therapeutic benefit (efficacy decreases in BMI > 40) will monitor closely   -- Ziopatch for palpitations showed small run of NSVT   -- Follow up in 6 months at [facility] RN visit with      Yelena Jones MD MSc  Division of Cardiology  [facility]

## 2020-12-15 NOTE — PATIENT INSTRUCTIONS
Discuss cymbalta with your pain provider since they started this medication.  Start wellbutrin 150 mg daily  Follow up with us in approximately one month if unable to see psychiatry in that time

## 2020-12-15 NOTE — PROGRESS NOTES
"Hilaria Bonner is a 29 year old female who is being evaluated via a billable telephone visit.      The patient has been notified of following:     \"This telephone visit will be conducted via a call between you and your physician/provider. We have found that certain health care needs can be provided without the need for a physical exam.  This service lets us provide the care you need with a short phone conversation.  If a prescription is necessary we can send it directly to your pharmacy.  If lab work is needed we can place an order for that and you can then stop by our lab to have the test done at a later time.    Telephone visits are billed at different rates depending on your insurance coverage. During this emergency period, for some insurers they may be billed the same as an in-person visit.  Please reach out to your insurance provider with any questions.    If during the course of the call the physician/provider feels a telephone visit is not appropriate, you will not be charged for this service.\"    Patient has given verbal consent for Telephone visit?  Yes    What phone number would you like to be contacted at? [phone]    How would you like to obtain your AVS? Ferdinand    Subjective     Hilaria Bonner is a 29 year old female who presents via phone visit today for the following health issues:    HPI     Depression and Anxiety Follow-Up    How are you doing with your depression since your last visit? Worsened     How are you doing with your anxiety since your last visit?  Worsened     Are you having other symptoms that might be associated with depression or anxiety? Yes:  possible panic attacks    Have you had a significant life event? Health Concerns     Do you have any concerns with your use of alcohol or other drugs? No    Social History     Tobacco Use     Smoking status: Heavy Tobacco Smoker     Packs/day: 0.30     Years: 1.00     Pack years: 0.30     Types: Cigarettes     Start date: 11/20/2016 "     Last attempt to quit: 2018     Years since quittin.9     Smokeless tobacco: Never Used   Substance Use Topics     Alcohol use: Not Currently     Alcohol/week: 0.0 standard drinks     Comment: occasional     Drug use: No     PHQ 2020 10/16/2020 12/15/2020   PHQ-9 Total Score - 16 21   Q9: Thoughts of better off dead/self-harm past 2 weeks Not at all Not at all Not at all   PHQ-A Total Score 23 - -   PHQ-A Mood affect on daily activities Extremely dIfficult - -   PHQ-A Suicide Ideation past 2 weeks Not at all - -     MELODY-7 SCORE 2020 10/16/2020 12/15/2020   Total Score - - -   Total Score 21 6 8     Last PHQ-9 12/15/2020   1.  Little interest or pleasure in doing things 3   2.  Feeling down, depressed, or hopeless 3   3.  Trouble falling or staying asleep, or sleeping too much 3   4.  Feeling tired or having little energy 3   5.  Poor appetite or overeating 3   6.  Feeling bad about yourself 2   7.  Trouble concentrating 2   8.  Moving slowly or restless 2   Q9: Thoughts of better off dead/self-harm past 2 weeks 0   PHQ-9 Total Score 21   Difficulty at work, home, or with people Very difficult     MELODY-7  12/15/2020   1. Feeling nervous, anxious, or on edge 3   2. Not being able to stop or control worrying 1   3. Worrying too much about different things 1   4. Trouble relaxing 1   5. Being so restless that it is hard to sit still 0   6. Becoming easily annoyed or irritable 1   7. Feeling afraid, as if something awful might happen 1   MELODY-7 Total Score 8   If you checked any problems, how difficult have they made it for you to do your work, take care of things at home, or get along with other people? Very difficult       Suicide Assessment Five-step Evaluation and Treatment (SAFE-T)      How many servings of fruits and vegetables do you eat daily?  2-3    On average, how many sweetened beverages do you drink each day (Examples: soda, juice, sweet tea, etc.  Do NOT count diet or artificially  "sweetened beverages)?   0    How many days per week do you exercise enough to make your heart beat faster? 4    How many minutes a day do you exercise enough to make your heart beat faster? 30 - 60    How many days per week do you miss taking your medication? 0    Patient well known to me.    \"It could be better\".  Neuropathy is getting worse.  Sensations are more intense especially since cold out.  Next appointment with neurology January 13 and will do nerve test as well.  Night sweats not better but has met with endocrinologist and unsure if hormone related or related to cymbalta.   Tried to reach out to endocrinologist to see when she could have labs performed - couldn't do hormone test since on depo-  Last depo was in October- due next moonth  Changed cymbalta same dose for 6 weeks   Taking 90 mg cymbalta   Trying medical cannibis - capsule or vapor- doesn't like how vapor makes me feel.  Can tolerate capsules.    Green one wasn't supposed to give me high but it did  Yellow more relaxed       Review of Systems   Constitutional, HEENT, cardiovascular, pulmonary, gi and gu systems are negative, except as otherwise noted.       Objective          Vitals:  No vitals were obtained today due to virtual visit.    healthy, alert and no distress  PSYCH: Alert and oriented times 3; coherent speech, normal   rate and volume, able to articulate logical thoughts, able   to abstract reason, no tangential thoughts, no hallucinations   or delusions  Her affect is normal and pleasant  RESP: No cough, no audible wheezing, able to talk in full sentences  Remainder of exam unable to be completed due to telephone visits            Assessment/Plan:    Assessment & Plan     Moderate major depression (H)  Symptoms not at goal  On cymbalta 90 mg- will leave it up to pain clinic if wishes to taper off cymbalta since not helping pain.  Start wellbutrin 150 mg daily  Referral to [facility] per her request  - buPROPion (WELLBUTRIN XL) 150 " MG 24 hr tablet  Dispense: 30 tablet; Refill: 1  - MENTAL HEALTH REFERRAL  - Adult; Psychiatry; Psychiatry; [facility]: Collaborative Care Psychiatry Service/Bridge to Long-Term Psychiatry as indicated ([phone]); Yes; Chronic Mental Health without improvement; Yes; We will contact you to schedule ...    Anxiety  Symptoms not at goal.  Having panic attacks on cymbalta 90 mg per pain clinic- will leave it up to pain clinic if wishes to taper off cymbalta   - buPROPion (WELLBUTRIN XL) 150 MG 24 hr tablet  Dispense: 30 tablet; Refill: 1  - MENTAL HEALTH REFERRAL  - Adult; Psychiatry; Psychiatry; [facility]: Collaborative Care Psychiatry Service/Bridge to Long-Term Psychiatry as indicated ([phone]); Yes; Chronic Mental Health without improvement; Yes; We will contact you to schedule ...       Tobacco Cessation:   reports that she has been smoking cigarettes. She started smoking about 4 years ago. She has a 0.30 pack-year smoking history. She has never used smokeless tobacco.  Tobacco Cessation Action Plan: Information offered: Patient not interested at this time       Depression Screening Follow Up    PHQ 12/15/2020   PHQ-9 Total Score 21   Q9: Thoughts of better off dead/self-harm past 2 weeks Not at all   PHQ-A Total Score -   PHQ-A Mood affect on daily activities -   PHQ-A Suicide Ideation past 2 weeks -         Follow Up Actions Taken  Mental Health Referral placed         Patient Instructions   Discuss cymbalta with your pain provider since they started this medication.  Start wellbutrin 150 mg daily  Follow up with us in approximately one month if unable to see psychiatry in that time      Follow up in 1 month(s) if not improving or any change in symptoms.      Crissy Madrigal PA-C  [facility]    Phone call duration:  15 minutes

## 2020-12-16 ENCOUNTER — HOME INFUSION (PRE-WILLOW HOME INFUSION) (OUTPATIENT)
Dept: PHARMACY | Facility: CLINIC | Age: 30
End: 2020-12-16

## 2020-12-16 ENCOUNTER — VIRTUAL VISIT (OUTPATIENT)
Dept: ENDOCRINOLOGY | Facility: CLINIC | Age: 30
End: 2020-12-16
Payer: COMMERCIAL

## 2020-12-16 DIAGNOSIS — Z71.3 NUTRITIONAL COUNSELING: Primary | ICD-10-CM

## 2020-12-16 DIAGNOSIS — E66.9 OBESITY: ICD-10-CM

## 2020-12-16 DIAGNOSIS — Z98.84 S/P LAPAROSCOPIC SLEEVE GASTRECTOMY: ICD-10-CM

## 2020-12-16 PROCEDURE — 97803 MED NUTRITION INDIV SUBSEQ: CPT | Mod: 95 | Performed by: DIETITIAN, REGISTERED

## 2020-12-16 ASSESSMENT — ANXIETY QUESTIONNAIRES: GAD7 TOTAL SCORE: 8

## 2020-12-16 NOTE — PATIENT INSTRUCTIONS
Follow up in 3 months. Someone should reach out to you to schedule. If you do not hear from anyone, call [phone] to schedule.     Nutrition Goals:  1) Continue bariatric regular diet.    - Eat lower fat meals, skip the butter/oil when cooking, loof for low-fat dressing.   - Avoid added sugar (BBQ sauce)  2) Eat 3 meals per day and one protein shake  3)Consume 60 grams of protein/day.    - Use soft, lean protein at meals (croc pot or pressure cook, stew meats). Eat protein foods first. May use another protein shake as meal.    - 75% of your meal should be a high protein food, 25% alternate between fruit/vegetable/whole grain.   4) Sip on 64-96 oz of fluids/day- between meals only (do not drink 30 mins before or after meal or while you're eating)  4) Eat slowly (>20 min/meal), chewing foods well (to applesauce-like consistency).   - Use a baby/toddler fork/spoon and small plate/bowls (should hold 1 cup or less)  5) Limit portions to 1/2-1 cup/meal.   6) Take the following supplements:    Multivitamin/minerals: adult dose 2 times daily    Iron: 45-60 mg elemental (18-36 mg if low risk) - may partly or fully be covered in multivitamin     Calcium Citrate containing vitamin D: 500 mg 3 times daily or 600 mg 2 times daily    Vitamin B12: sublingual form of at least 500 mcg daily or injection of 1000 mcg monthly     B-50 Complex once daily  7) Continue PT exercises daily at home      High-Protein Blended Recipes  [email or web address]    Soft Foods  [email or web address]    Supplements after Weight Loss Surgery  [email or web address]     Keeping Track of Fluids  [email or web address]

## 2020-12-16 NOTE — LETTER
"12/16/2020       RE: Hilaria Bonner  [address]  [address]  [place] [address]     Dear Colleague,    Thank you for referring your patient, Hilaria Bonner, to the [facility] [place] at [facility]. Please see a copy of my visit note below.    Hilaria Bonner is a 29 year old female who is being evaluated via a billable telephone visit.      The patient has been notified of following:     \"This telephone visit will be conducted via a call between you and your physician/provider. We have found that certain health care needs can be provided without the need for a physical exam.  This service lets us provide the care you need with a short phone conversation.  If a prescription is necessary we can send it directly to your pharmacy.  If lab work is needed we can place an order for that and you can then stop by our lab to have the test done at a later time.    Telephone visits are billed at different rates depending on your insurance coverage. During this emergency period, for some insurers they may be billed the same as an in-person visit.  Please reach out to your insurance provider with any questions.    If during the course of the call the physician/provider feels a telephone visit is not appropriate, you will not be charged for this service.\"    Patient has given verbal consent for Telephone visit?  Yes    What phone number would you like to be contacted at? [phone]    How would you like to obtain your AVS? Two[employer]hart    Phone call duration: 23 minutes    During this virtual visit the patient is located in [place], patient verifies this as the location during the entirety of this visit.     Reason For Visit:  Hilaria Bonner is a 29 year old female, completed a virtual visit today for nutrition follow-up, 21 months s/p SG with Dr Lopez(3/26/19).  Patient referred by Joanne TOVAR.      Anthropometrics  Initial Consult Weight: " "315.4 lbs  Day of Surgery Weight(3/26/19): 291.2 lbs  Current Weight:   Estimated body mass index is 38.37 kg/m  as calculated from the following:    Height as of 12/14/20: 1.702 m (5' 7\").    Weight as of 12/14/20: 111.1 kg (245 lb).  Weight loss: 70 lbs     Current Vitamins/Minerals: MVI/minerals (gummy - once per day, no iron), 500 mg Calcium Citrate (TID), Vitamin D3 (2000 international unit(s)), Vitamin B12 injection, Hair Skin and Nails (vitamin C, vitamin E, biotin)     Nutrition History:   Pt reports little appetite, frequent nausea and vomiting 2-3 times per week. Poor tolerance to red meats, and occ other meats. Once done eating feels like meat just sits in her stomach. Will bake, sautee, rincon or boil meats. Notes she may be eating too quickly. Tolerates fruits and vegetables well. Pt notes she continues to drink 128 oz of water per day, and tries to separate when she is drinking and eating.     Progress Towards Previous Goals:  1) Continue bariatric regular diet. - Met, continues  2) Consume 60 grams of protein/day. Protein sources high in potassium: - Improving, difficult with poor tolerance to meats . Has done a protein shake in the past.    - White Beans, Lima beans   - Fish (Cod, halibut, tuna)  - Unflavored soy milk  - Low fat dairy  3) Sip on 64 oz of fluids/day- between meals only. - Met, continues to drink a lot of water 128 oz per day  4) Eat slowly (>20 min/meal), chewing foods well (to applesauce-like consistency). - Met at times, pt notes she could slow down and chew better.   5) Limit portions to 1/2-1 cup/meal. 75% of your meal should be a high protein food, 25% alternate between fruit/vegetable/whole grain. You can use protein shake for snack between meals to meet protein needs.  - Met, continues   6) Take the following supplements:    Multivitamin/minerals: adult dose 2 times daily - Not met, only taking one gummy per day    Iron: 45-60 mg elemental (18-36 mg if low risk) - may partly or " fully be covered in multivitamin - Not met    Calcium Citrate containing vitamin D: 500 mg 3 times daily or 600 mg 2 times daily - Met, continues to take 500 mgTID    Vitamin B12: sublingual form of at least 500 mcg daily or injection of 1000 mcg monthly - Met, continues    B-50 Complex once daily - Not met  7) Continue PT exercises daily at home - Met, continues 30 min PT and 60 mins walking daily.      Physical Activity:  Walking at work. Works at a [employer]. Too much walking hurts ankle. Ortho gave exercises to do at home.      Nutrition Prescription:  Grams Protein: 50-60 (minimum)  Amount of Fluid: 48-64 oz       Nutrition Diagnosis  Previous: Food and nutrition-related knowledge deficit r/t lack of prior exposure to diet advancements beyond bariatric pureed diet aeb pt unable to verbalize full understanding of bariatric soft and regular consistency diets. - resolved     Current: Food and nutrition-related knowledge deficit r/t limited prior exposure to maintenance diet guidelines after bariatric surgery aeb pt unable to verbalize full understanding of maintenance diet guidelines post bariatric surgery.      Intervention  Materials/Education provided.  Reviewed maintenance guidelines after bariatric surgery, portion sizes, eating pace, snacking, separation of beverages, and protein needs. Provided vitamin and mineral recommendations. Encouraged pt to reduce fluid intake by 75% and to focus on  fluid intake from food intake to optimize protein/nutrition intake at meals. Encouraged pt to focus on consuming, aoft, lean protein at meals to improve tolerance. Provided education on sources of soft, lean protein. Encouraged pt to focus on slowing pace of meal intake further, chewing more thoroughly and using small utensils/plates to help with this. Sent goals to pt via My Chart.       Goals:  1) Continue bariatric regular diet.    - Eat lower fat meals, skip the butter/oil when cooking, loof for low-fat  dressing.   - Avoid added sugar (BBQ sauce)  2) Eat 3 meals per day and one protein shake  3)Consume 60 grams of protein/day.    - Use soft, lean protein at meals (croc pot or pressure cook, stew meats). Eat protein foods first. May use another protein shake as meal.    - 75% of your meal should be a high protein food, 25% alternate between fruit/vegetable/whole grain.   4) Sip on 64-96 oz of fluids/day- between meals only (do not drink 30 mins before or after meal or while you're eating)  4) Eat slowly (>20 min/meal), chewing foods well (to applesauce-like consistency).   - Use a baby/toddler fork/spoon and small plate/bowls (should hold 1 cup or less)  5) Limit portions to 1/2-1 cup/meal.   6) Take the following supplements:    Multivitamin/minerals: adult dose 2 times daily    Iron: 45-60 mg elemental (18-36 mg if low risk) - may partly or fully be covered in multivitamin     Calcium Citrate containing vitamin D: 500 mg 3 times daily or 600 mg 2 times daily    Vitamin B12: sublingual form of at least 500 mcg daily or injection of 1000 mcg monthly     B-50 Complex once daily  7) Continue PT exercises daily at home      High-Protein Blended Recipes  [email or web address]    Soft Foods  [email or web address]    Supplements after Weight Loss Surgery  [email or web address]     Keeping Track of Fluids  [email or web address]       Follow-Up:  3 months, MARINA Linton, HILARY, LD

## 2020-12-17 ENCOUNTER — HOSPITAL ENCOUNTER (OUTPATIENT)
Dept: PHYSICAL THERAPY | Facility: CLINIC | Age: 30
Setting detail: THERAPIES SERIES
End: 2020-12-17
Attending: PHYSICIAN ASSISTANT
Payer: COMMERCIAL

## 2020-12-17 ENCOUNTER — HOSPITAL ENCOUNTER (OUTPATIENT)
Dept: OCCUPATIONAL THERAPY | Facility: CLINIC | Age: 30
Setting detail: THERAPIES SERIES
End: 2020-12-17
Attending: PHYSICIAN ASSISTANT
Payer: COMMERCIAL

## 2020-12-17 ENCOUNTER — ANCILLARY PROCEDURE (OUTPATIENT)
Dept: ULTRASOUND IMAGING | Facility: CLINIC | Age: 30
End: 2020-12-17
Attending: INTERNAL MEDICINE
Payer: COMMERCIAL

## 2020-12-17 DIAGNOSIS — R23.2 FLUSHES: ICD-10-CM

## 2020-12-17 DIAGNOSIS — Z98.84 S/P LAPAROSCOPIC SLEEVE GASTRECTOMY: ICD-10-CM

## 2020-12-17 DIAGNOSIS — I72.4 PSEUDOANEURYSM OF RIGHT FEMORAL ARTERY (H): ICD-10-CM

## 2020-12-17 DIAGNOSIS — R61 NIGHT SWEATS: ICD-10-CM

## 2020-12-17 LAB
ALBUMIN SERPL-MCNC: 2.9 G/DL (ref 3.4–5)
ALP SERPL-CCNC: 141 U/L (ref 40–150)
ALT SERPL W P-5'-P-CCNC: 22 U/L (ref 0–50)
ANION GAP SERPL CALCULATED.3IONS-SCNC: 3 MMOL/L (ref 3–14)
AST SERPL W P-5'-P-CCNC: 24 U/L (ref 0–45)
BILIRUB SERPL-MCNC: 0.7 MG/DL (ref 0.2–1.3)
BUN SERPL-MCNC: 7 MG/DL (ref 7–30)
CALCIUM SERPL-MCNC: 9.2 MG/DL (ref 8.5–10.1)
CHLORIDE SERPL-SCNC: 104 MMOL/L (ref 94–109)
CHOLEST SERPL-MCNC: 200 MG/DL
CO2 SERPL-SCNC: 33 MMOL/L (ref 20–32)
CREAT SERPL-MCNC: 0.7 MG/DL (ref 0.52–1.04)
ERYTHROCYTE [DISTWIDTH] IN BLOOD BY AUTOMATED COUNT: 18.5 % (ref 10–15)
ESTRADIOL SERPL-MCNC: 54 PG/ML
FERRITIN SERPL-MCNC: 111 NG/ML (ref 12–150)
FSH SERPL-ACNC: 6.4 IU/L
GFR SERPL CREATININE-BSD FRML MDRD: >90 ML/MIN/{1.73_M2}
GLUCOSE SERPL-MCNC: 117 MG/DL (ref 70–99)
HBA1C MFR BLD: 5.7 % (ref 0–5.6)
HCT VFR BLD AUTO: 41.5 % (ref 35–47)
HDLC SERPL-MCNC: 68 MG/DL
HGB BLD-MCNC: 13.5 G/DL (ref 11.7–15.7)
LDLC SERPL CALC-MCNC: 112 MG/DL
LIPASE SERPL-CCNC: 378 U/L (ref 73–393)
MCH RBC QN AUTO: 34.4 PG (ref 26.5–33)
MCHC RBC AUTO-ENTMCNC: 32.5 G/DL (ref 31.5–36.5)
MCV RBC AUTO: 106 FL (ref 78–100)
NONHDLC SERPL-MCNC: 132 MG/DL
PLATELET # BLD AUTO: 331 10E9/L (ref 150–450)
POTASSIUM SERPL-SCNC: 3.7 MMOL/L (ref 3.4–5.3)
PROLACTIN SERPL-MCNC: 10 UG/L (ref 3–27)
PROT SERPL-MCNC: 7.4 G/DL (ref 6.8–8.8)
PTH-INTACT SERPL-MCNC: 55 PG/ML (ref 18–80)
RBC # BLD AUTO: 3.93 10E12/L (ref 3.8–5.2)
SODIUM SERPL-SCNC: 140 MMOL/L (ref 133–144)
TRIGL SERPL-MCNC: 102 MG/DL
VIT B12 SERPL-MCNC: 406 PG/ML (ref 193–986)
WBC # BLD AUTO: 5.2 10E9/L (ref 4–11)

## 2020-12-17 PROCEDURE — 83001 ASSAY OF GONADOTROPIN (FSH): CPT | Performed by: INTERNAL MEDICINE

## 2020-12-17 PROCEDURE — 82670 ASSAY OF TOTAL ESTRADIOL: CPT | Performed by: INTERNAL MEDICINE

## 2020-12-17 PROCEDURE — 82306 VITAMIN D 25 HYDROXY: CPT | Performed by: INTERNAL MEDICINE

## 2020-12-17 PROCEDURE — 84146 ASSAY OF PROLACTIN: CPT | Performed by: INTERNAL MEDICINE

## 2020-12-17 PROCEDURE — 99000 SPECIMEN HANDLING OFFICE-LAB: CPT | Performed by: INTERNAL MEDICINE

## 2020-12-17 PROCEDURE — 97110 THERAPEUTIC EXERCISES: CPT | Mod: GP | Performed by: PHYSICAL THERAPIST

## 2020-12-17 PROCEDURE — 82607 VITAMIN B-12: CPT | Performed by: INTERNAL MEDICINE

## 2020-12-17 PROCEDURE — 80061 LIPID PANEL: CPT | Performed by: INTERNAL MEDICINE

## 2020-12-17 PROCEDURE — 82728 ASSAY OF FERRITIN: CPT | Performed by: INTERNAL MEDICINE

## 2020-12-17 PROCEDURE — 84425 ASSAY OF VITAMIN B-1: CPT | Mod: 90 | Performed by: PHYSICIAN ASSISTANT

## 2020-12-17 PROCEDURE — 83970 ASSAY OF PARATHORMONE: CPT | Performed by: INTERNAL MEDICINE

## 2020-12-17 PROCEDURE — 80053 COMPREHEN METABOLIC PANEL: CPT | Performed by: INTERNAL MEDICINE

## 2020-12-17 PROCEDURE — 83036 HEMOGLOBIN GLYCOSYLATED A1C: CPT | Performed by: INTERNAL MEDICINE

## 2020-12-17 PROCEDURE — 97530 THERAPEUTIC ACTIVITIES: CPT | Mod: GO | Performed by: OCCUPATIONAL THERAPIST

## 2020-12-17 PROCEDURE — 36415 COLL VENOUS BLD VENIPUNCTURE: CPT | Performed by: INTERNAL MEDICINE

## 2020-12-17 PROCEDURE — 83690 ASSAY OF LIPASE: CPT | Performed by: INTERNAL MEDICINE

## 2020-12-17 PROCEDURE — 84305 ASSAY OF SOMATOMEDIN: CPT | Performed by: INTERNAL MEDICINE

## 2020-12-17 PROCEDURE — 84590 ASSAY OF VITAMIN A: CPT | Mod: 90 | Performed by: INTERNAL MEDICINE

## 2020-12-17 PROCEDURE — 97112 NEUROMUSCULAR REEDUCATION: CPT | Mod: GP | Performed by: PHYSICAL THERAPIST

## 2020-12-17 PROCEDURE — 93926 LOWER EXTREMITY STUDY: CPT | Mod: RT | Performed by: RADIOLOGY

## 2020-12-17 PROCEDURE — 85027 COMPLETE CBC AUTOMATED: CPT | Performed by: INTERNAL MEDICINE

## 2020-12-17 NOTE — LETTER
December 21, 2020      Hilaria Bonner  [address]  [address]  [place] [address]        Dear ,    We are writing to inform you of your test results.    Your B1 is mildly low as it has been in the past.  Please make sure you start to take the B complex vitamin daily as we discussed at our visit and your visit with our dietitian Jessie.  Also please make sure to avoid all alcohol use.    Your albumin level is also low.  Please make sure you are eating >60grams protein daily. If you are struggling to do this please call [phone] to schedule another visit with lionel Roman.    Please continue to take your other vitamins discussed at last visit very regularly also.    Please follow up in 3 months for a visit with me or Shilpa Noel to see how you are doing.  Call [phone] to schedule.  Thanks! Joanne Amin   Vitamin B1 whole blood   Result Value Ref Range    Vitamin B1 Whole Blood Level 66 (L) 70 - 180 nmol/L      Comment:      (Note)  INTERPRETIVE INFORMATION: Vitamin B1, Whole Blood  This assay measures the concentration of thiamine   diphosphate (TDP), the primary active form of vitamin B1.   Approximately 90 percent of vitamin B1 present in whole   blood is TDP. Thiamine and thiamine monophosphate, which   comprise the remaining 10 percent, are not measured.  Test developed and characteristics determined by [facility]. See Compliance Statement B: [employer]/CS  Performed By: [facility]  [address] [place] [id number]  : Abigail Mars MD     Lipase   Result Value Ref Range    Lipase 378 73 - 393 U/L   Parathyroid Hormone Intact   Result Value Ref Range    Parathyroid Hormone Intact 55 18 - 80 pg/mL   Vitamin A   Result Value Ref Range    Vitamin A 0.61 0.30 - 1.20 mg/L    Retinol Palmitate <0.02 0.00 - 0.10 mg/L    Vitamin A Interp Normal       Comment:      (Note)  Test developed and characteristics determined by [facility]    [facility]atories. See Compliance Statement B: [employer]/CS  Performed By: [facility]  [address] [place] [address]  : Abigail Mars MD     Vitamin B12   Result Value Ref Range    Vitamin B12 406 193 - 986 pg/mL   Lipid panel reflex to direct LDL Fasting   Result Value Ref Range    Cholesterol 200 (H) <200 mg/dL      Comment:      Desirable:       <200 mg/dl    Triglycerides 102 <150 mg/dL      Comment:      Fasting specimen    HDL Cholesterol 68 >49 mg/dL    LDL Cholesterol Calculated 112 (H) <100 mg/dL      Comment:      Above desirable:  100-129 mg/dl  Borderline High:  130-159 mg/dL  High:             160-189 mg/dL  Very high:       >189 mg/dl      Non HDL Cholesterol 132 (H) <130 mg/dL      Comment:      Above Desirable:  130-159 mg/dl  Borderline high:  160-189 mg/dl  High:             190-219 mg/dl  Very high:       >219 mg/dl     Hemoglobin A1c   Result Value Ref Range    Hemoglobin A1C 5.7 (H) 0 - 5.6 %      Comment:      Normal <5.7% Prediabetes 5.7-6.4%  Diabetes 6.5% or higher - adopted from ADA   consensus guidelines.     Ferritin   Result Value Ref Range    Ferritin 111 12 - 150 ng/mL   Comprehensive metabolic panel   Result Value Ref Range    Sodium 140 133 - 144 mmol/L    Potassium 3.7 3.4 - 5.3 mmol/L    Chloride 104 94 - 109 mmol/L    Carbon Dioxide 33 (H) 20 - 32 mmol/L    Anion Gap 3 3 - 14 mmol/L    Glucose 117 (H) 70 - 99 mg/dL      Comment:      Fasting specimen    Urea Nitrogen 7 7 - 30 mg/dL    Creatinine 0.70 0.52 - 1.04 mg/dL    GFR Estimate >90 >60 mL/min/[1.73_m2]      Comment:      Non  GFR Calc  Starting 12/18/2018, serum creatinine based estimated GFR (eGFR) will be   calculated using the Chronic Kidney Disease Epidemiology Collaboration   (CKD-EPI) equation.      GFR Estimate If Black >90 >60 mL/min/[1.73_m2]      Comment:       GFR Calc  Starting 12/18/2018, serum creatinine based estimated GFR (eGFR) will be    calculated using the Chronic Kidney Disease Epidemiology Collaboration   (CKD-EPI) equation.      Calcium 9.2 8.5 - 10.1 mg/dL    Bilirubin Total 0.7 0.2 - 1.3 mg/dL    Albumin 2.9 (L) 3.4 - 5.0 g/dL    Protein Total 7.4 6.8 - 8.8 g/dL    Alkaline Phosphatase 141 40 - 150 U/L    ALT 22 0 - 50 U/L    AST 24 0 - 45 U/L   CBC with platelets   Result Value Ref Range    WBC 5.2 4.0 - 11.0 10e9/L    RBC Count 3.93 3.8 - 5.2 10e12/L    Hemoglobin 13.5 11.7 - 15.7 g/dL    Hematocrit 41.5 35.0 - 47.0 %     (H) 78 - 100 fl    MCH 34.4 (H) 26.5 - 33.0 pg    MCHC 32.5 31.5 - 36.5 g/dL    RDW 18.5 (H) 10.0 - 15.0 %    Platelet Count 331 150 - 450 10e9/L       If you have any questions or concerns, please call the clinic at the number listed above.       Sincerely,      Joanne Sterling PA-C

## 2020-12-17 NOTE — PATIENT INSTRUCTIONS
1. Leg ultrasound at your convenience  2. Continue current medication   3. Would like to repeat EKG (will need RN visit for this up in [place])  4. Follow up in 6 months

## 2020-12-17 NOTE — PROGRESS NOTES
This is a recent snapshot of the patient's [facility] medical record.  For current drug dose and complete information and questions, call [phone] or In Basket pool, [facility] home infusion ([id number])  CSN Number:  [id number]

## 2020-12-18 ENCOUNTER — DOCUMENTATION ONLY (OUTPATIENT)
Dept: PHARMACY | Facility: CLINIC | Age: 30
End: 2020-12-18

## 2020-12-18 ENCOUNTER — HOME INFUSION (PRE-WILLOW HOME INFUSION) (OUTPATIENT)
Dept: PHARMACY | Facility: CLINIC | Age: 30
End: 2020-12-18

## 2020-12-18 ENCOUNTER — MYC MEDICAL ADVICE (OUTPATIENT)
Dept: FAMILY MEDICINE | Facility: CLINIC | Age: 30
End: 2020-12-18

## 2020-12-18 DIAGNOSIS — R42 DIZZINESS: Primary | ICD-10-CM

## 2020-12-18 LAB — IGF-I BLD-MCNC: 138 NG/ML (ref 91–293)

## 2020-12-18 NOTE — PROGRESS NOTES
Skilled Nurse visit in the patient home to administer Privigen. Pt states she has been feeling more numbness, tingling, weakness and depression/anixety - notified Dr Harshil hatch to proceed with infusion today. Pt to follow up with her PCP. Pt started on new antidepressant. PIV placed right hand, 1 attempt.  Pre medicated with tylenol, benadryl, and IV hydrocortisone. Infusion completed without complication or reaction.     Jaqueline Posey RN  [facility]   [facility]   [address] [place] [id number]   [email or web address]  [email or web address]   Cell: [phone]  Fax: [phone]   Connect with [facility] on social media.

## 2020-12-18 NOTE — TELEPHONE ENCOUNTER
Attempted to reach patient's nurse and received voice mail. Did not leave voice mail. Instead, attempted to reach patient herself, to triage low blood pressure with symptoms.       This writer attempted to contact [facility] on 12/18/20      Reason for call triage and left message with individual answering the phone to call #[phone] as soon as possible to speak to a nurse.      Tiffanie Lr RN  [facility]

## 2020-12-20 LAB
ANNOTATION COMMENT IMP: NORMAL
RETINYL PALMITATE SERPL-MCNC: <0.02 MG/L (ref 0–0.1)
VIT A SERPL-MCNC: 0.61 MG/L (ref 0.3–1.2)
VIT B1 BLD-MCNC: 66 NMOL/L (ref 70–180)

## 2020-12-21 ENCOUNTER — VIRTUAL VISIT (OUTPATIENT)
Dept: PALLIATIVE MEDICINE | Facility: CLINIC | Age: 30
End: 2020-12-21
Payer: COMMERCIAL

## 2020-12-21 DIAGNOSIS — G61.81 CHRONIC INFLAMMATORY DEMYELINATING POLYNEUROPATHY (H): ICD-10-CM

## 2020-12-21 DIAGNOSIS — G61.81 CIDP (CHRONIC INFLAMMATORY DEMYELINATING POLYNEUROPATHY) (H): Primary | ICD-10-CM

## 2020-12-21 DIAGNOSIS — R20.2 PARESTHESIAS: ICD-10-CM

## 2020-12-21 DIAGNOSIS — G89.4 CHRONIC PAIN SYNDROME: ICD-10-CM

## 2020-12-21 PROCEDURE — 99214 OFFICE O/P EST MOD 30 MIN: CPT | Mod: 95 | Performed by: NURSE PRACTITIONER

## 2020-12-21 RX ORDER — DULOXETIN HYDROCHLORIDE 60 MG/1
60 CAPSULE, DELAYED RELEASE ORAL 2 TIMES DAILY
Qty: 60 CAPSULE | Refills: 1 | Status: SHIPPED | OUTPATIENT
Start: 2020-12-21 | End: 2021-02-17

## 2020-12-21 ASSESSMENT — PAIN SCALES - GENERAL: PAINLEVEL: WORST PAIN (10)

## 2020-12-21 NOTE — RESULT ENCOUNTER NOTE
Hilaria,    The blood work results for prolactin, FSH, estradiol and IGF-1 levels to assess underlying reason for the hot flushes/sweating have come back within the normal limits (expected range).     Overall, the hormonal assessment didn't show any underlying etiology to explain the hot flushes.     The Depo-Provera has side effect of hot flushes up to 30% and this can be the possible underlying cause. The blood work results didn't support or exclude this possibility.     As a next step, it might be reasonable to discuss with your gynecologist other form of contraception particularly if your symptoms persist.    Please let me know if you have any questions regarding the blood work results or next steps.     Joseph Kinney MD

## 2020-12-21 NOTE — TELEPHONE ENCOUNTER
Received fax from pharmacy requesting refill(s) for DULoxetine (CYMBALTA) 60 MG capsule     Last refilled on 11/12/20    Pt last seen on 12/21/20  Next appt scheduled for none    E-prescribe to:    [id number] - [place], [place] - [address] AT [facility]     Will facilitate refill.    Amanda Joyce MA  [facility] [place]

## 2020-12-21 NOTE — TELEPHONE ENCOUNTER
"Patient contacted. She reports she started a new anxiety medication called Bupropion on Wednesday. She took this medication Wednesday, Thursday, and Friday. Thursday she felt the worst. She felt jittery and unstable. She felt dizzy and tired. Friday at her (once every 3 week IV treatment for neuropathy) she kept falling asleep during treatment and felt dizzy. At that time her blood pressure on her wrist was low (see record of blood pressure numbers listed below, in Mobi-[employer]ot message).     Patient is attributing her symptoms to starting the Bupropion. She didn't take it Saturday, Sunday, or today and she feels back to normal. Currently she denies any dizziness, lethargy/fatigue, CP, SOB, or weakness. She feels \"normal.\"     She has a telephone visit planned today with her pain doctor, Janki. She plans to discuss this medication side effect with her then.     Would Crissy please send a prescription for a home blood pressure monitor to her [facility] in [place]?     Routing to provider to review and advise.     Tiffanie Lr RN  [facility]        "

## 2020-12-21 NOTE — PATIENT INSTRUCTIONS
1.  Pain Physical Therapy:    Continue neuro physical therapy and occupational therapy on a regular basis as recommended and as planned.     2.  Pain Psychologist to address relaxation, behavioral change, coping style, and other factors important to improvement.   Continue visits with Jasmin Prince PsyD, LP. I am hopeful these resources will provide some benefit.    3.  Medication Management:     1. Continue Cymbalta 120mg daily for now. If we do not see significant pain or mood benefit in the next month or two, we will likely taper off.      2. Continue Lyrica 200mg three times daily.    3. Continue medical cannabis as able/finances allow. I would suggest reaching out to the pharmacist with the dispensary to see which products or adjustments they may make.    4.  Potential procedures: not at this time.     5.  Referrals: continue evaluation with neurology as planned. Recommend the purchase of a TENS unit.    6.  Follow up with Dr. Chester while I am on maternity leave, in 6-8 weeks.       ----------------------------------------------------------------  Clinic Number:  [phone]     Call with any questions about your care and for scheduling assistance.     Calls are returned Monday through Friday between 8 AM and 4:30 PM. We usually get back to you within 2 business days depending on the issue/request.    If we are prescribing your medications:    For opioid medication refills, call the clinic or send a QuickSolar message 7 days in advance.  Please include:    Name of requested medication    Name of the pharmacy.    For non-opioid medications, call your pharmacy directly to request a refill. Please allow 3-4 days to be processed.     Per [place] State Law:    All controlled substance prescriptions must be filled within 30 days of being written.      For those controlled substances allowing refills, pickup must occur within 30 days of last fill.      We believe regular attendance is key to your success in our program!       Any time you are unable to keep your appointment we ask that you call us at least 24 hours in advance to cancel.This will allow us to offer the appointment time to another patient.     Multiple missed appointments may lead to dismissal from the clinic.

## 2020-12-21 NOTE — TELEPHONE ENCOUNTER
dme order signed.  Please fax.  Continue off buproprion - follow up with psychiatry for further med adjustments

## 2020-12-22 ENCOUNTER — ANCILLARY PROCEDURE (OUTPATIENT)
Dept: ULTRASOUND IMAGING | Facility: CLINIC | Age: 30
End: 2020-12-22
Attending: INTERNAL MEDICINE
Payer: COMMERCIAL

## 2020-12-22 DIAGNOSIS — I72.4 PSEUDOANEURYSM OF RIGHT FEMORAL ARTERY (H): ICD-10-CM

## 2020-12-22 LAB — DEPRECATED CALCIDIOL+CALCIFEROL SERPL-MC: 32 UG/L (ref 20–75)

## 2020-12-22 PROCEDURE — 93970 EXTREMITY STUDY: CPT | Mod: GC | Performed by: RADIOLOGY

## 2020-12-22 NOTE — PROGRESS NOTES
This is a recent snapshot of the patient's [facility] medical record.  For current drug dose and complete information and questions, call [phone] or In Basket pool, fv home infusion ([id number])  CSN Number:  [id number]

## 2020-12-23 NOTE — TELEPHONE ENCOUNTER
RN responded to patient via [employer].       Dr. Kwok's recommendations.        Rn visit for vitals and EKG, if abnormal then will hook up a ziopatch. Can you coordinate somehow the nurse visit to notify us of EKG results. If abnormal (tachycardia) then can place for 2 day holter.

## 2020-12-30 ENCOUNTER — HOSPITAL ENCOUNTER (OUTPATIENT)
Dept: OCCUPATIONAL THERAPY | Facility: CLINIC | Age: 30
Setting detail: THERAPIES SERIES
End: 2020-12-30
Attending: PHYSICIAN ASSISTANT
Payer: COMMERCIAL

## 2020-12-30 DIAGNOSIS — R06.02 SHORTNESS OF BREATH: ICD-10-CM

## 2020-12-30 PROCEDURE — 97530 THERAPEUTIC ACTIVITIES: CPT | Mod: GO | Performed by: OCCUPATIONAL THERAPIST

## 2020-12-31 ENCOUNTER — TELEPHONE (OUTPATIENT)
Dept: OBGYN | Facility: CLINIC | Age: 30
End: 2020-12-31

## 2020-12-31 DIAGNOSIS — N91.2 AMENORRHEA: Primary | ICD-10-CM

## 2020-12-31 NOTE — TELEPHONE ENCOUNTER
RN took call from Lab stating future urine pregnancy test needing to be ordered as pt has 1/4/2020 appt for nurse only for depo injection.    RN routing to provider who placed depo orders for advisement on future lab order.    Davida Rose RN on 12/31/2020 at 3:05 PM

## 2021-01-01 ENCOUNTER — APPOINTMENT (OUTPATIENT)
Dept: LAB | Facility: CLINIC | Age: 31
End: 2021-01-01
Attending: PSYCHIATRY & NEUROLOGY
Payer: COMMERCIAL

## 2021-01-04 ENCOUNTER — ALLIED HEALTH/NURSE VISIT (OUTPATIENT)
Dept: NURSING | Facility: CLINIC | Age: 31
End: 2021-01-04
Payer: COMMERCIAL

## 2021-01-04 ENCOUNTER — HOSPITAL ENCOUNTER (OUTPATIENT)
Dept: PHYSICAL THERAPY | Facility: CLINIC | Age: 31
Setting detail: THERAPIES SERIES
End: 2021-01-04
Attending: PHYSICIAN ASSISTANT
Payer: COMMERCIAL

## 2021-01-04 DIAGNOSIS — N91.2 AMENORRHEA: ICD-10-CM

## 2021-01-04 DIAGNOSIS — N92.1 MENORRHAGIA WITH IRREGULAR CYCLE: Primary | ICD-10-CM

## 2021-01-04 LAB — HCG UR QL: NEGATIVE

## 2021-01-04 PROCEDURE — 97032 APPL MODALITY 1+ESTIM EA 15: CPT | Mod: GP | Performed by: PHYSICAL THERAPIST

## 2021-01-04 PROCEDURE — 96372 THER/PROPH/DIAG INJ SC/IM: CPT

## 2021-01-04 PROCEDURE — 97112 NEUROMUSCULAR REEDUCATION: CPT | Mod: GP | Performed by: PHYSICAL THERAPIST

## 2021-01-04 PROCEDURE — 99207 PR NO CHARGE NURSE ONLY: CPT

## 2021-01-04 PROCEDURE — 81025 URINE PREGNANCY TEST: CPT | Performed by: NURSE PRACTITIONER

## 2021-01-04 RX ADMIN — MEDROXYPROGESTERONE ACETATE 150 MG: 150 INJECTION, SUSPENSION INTRAMUSCULAR at 09:47

## 2021-01-04 NOTE — RESULT ENCOUNTER NOTE
Riky London,   Your pregnancy test is negative.  Thank you,  RG Raygoza, NP-C  [facility]

## 2021-01-04 NOTE — NURSING NOTE
Clinic Administered Medication Documentation      Depo Provera Documentation    URINE HCG: negative    Depo-Provera Standing Order inclusion/exclusion criteria reviewed.   Patient meets: inclusion criteria     BP: Data Unavailable  LAST PAP/EXAM:   Lab Results   Component Value Date    PAP NIL 09/27/2019       Prior to injection, verified patient identity using patient's name and date of birth. Medication was administered. Please see MAR and medication order for additional information.     Was entire vial of medication used? Yes  Vial/Syringe: Single dose vial  Expiration Date:  06/22    Patient instructed to report any adverse reaction to staff immediately .  NEXT INJECTION DUE: 3/22/21 - 4/5/21    The following medication was given:     ROUTE: IM  SITE: Deltoid - Left-Patient request  DOSE: 150mg       Ashu Hamlin CMA

## 2021-01-05 ENCOUNTER — PATIENT OUTREACH (OUTPATIENT)
Dept: NURSING | Facility: CLINIC | Age: 31
End: 2021-01-05
Payer: COMMERCIAL

## 2021-01-05 RX ORDER — TOPIRAMATE 50 MG/1
TABLET, FILM COATED ORAL
COMMUNITY
Start: 2020-11-19 | End: 2021-03-22

## 2021-01-05 RX ORDER — HYDROCODONE BITARTRATE AND ACETAMINOPHEN 5; 325 MG/1; MG/1
TABLET ORAL
COMMUNITY
Start: 2020-11-25 | End: 2021-02-11

## 2021-01-05 RX ORDER — QUETIAPINE FUMARATE 25 MG/1
TABLET, FILM COATED ORAL
COMMUNITY
Start: 2020-12-19 | End: 2021-03-22

## 2021-01-05 RX ORDER — BUPROPION HYDROCHLORIDE 150 MG/1
TABLET ORAL
COMMUNITY
Start: 2020-12-15 | End: 2021-03-22 | Stop reason: SINTOL

## 2021-01-05 NOTE — PROGRESS NOTES
"Clinic Care Coordination Contact    Follow Up Progress Note      Assessment: Outreach to patient.     Patient states she \"is about the same\"  She feels she is having more pain and burning in her hands and feet again. She states she now has a TENS unit. PT helped her get this set up. She states she is using it for 20 minutes at least twice a day. She states she would not know she has it on except her toes move and her calf muscles contract. She can not feel it at all.     She is scheduled for a repeat EMG on 1/13/21 with Dr. Chua. She states she has some compression gloves, but she is not able to wear them for very long as the burning gets too intense.     Patient states she started medical canabis. She does not like the \"head high feeling\". She states she talked to the pharmacist at the dispensary today so they came up with a plan. She states it is helping her sleep so that is beneficial. She states it cost over $170 for the start up kit and she is going to have to use it sparingly due to ongoing cost.     Patient states her mother, who had been both her and her children's caregiver is now having some health issues of her own. She is not able to help patient at this time. Lita states she called the county today and requested an assessment for [place]. She is not be to shower by herself. She needs help with all IADL's.     Patient states she is trying not to get down. She states I am doing everything that has been asked of me and still feel like I am not getting any better. It gets frustrating.  (patient has been very compliant with the treatment plan)           Goals addressed this encounter: No improvement  Goals Addressed                 This Visit's Progress      Improve chronic symptoms (pt-stated)   20%     Goal Statement: I want the burning pain in my hands to improve    Date Goal set: 6/12/20    Barriers: Polyneuropathy    Strengths: Family support    Date to Achieve By: December 2020    Patient expressed " understanding of goal: yes    Action steps to achieve this goal:  1. I will continue Lyrica TID  2. I will use Tramadol as instructed for pain  3. I will participate in outpatient therapies         Intervention/Education provided during outreach: Listening, reassurance, and encouragement     Outreach Frequency: monthly    Plan:   Patient will continue to follow treatment plan with [facility] providers in order to find a potential solution to her residual symptoms from covid-19  .    Care Coordinator will follow up in one month.    Monica Hernandez RN, [facility] RN Coordinator  [facility]   1/5/2021     [phone]

## 2021-01-06 ENCOUNTER — HOME INFUSION (PRE-WILLOW HOME INFUSION) (OUTPATIENT)
Dept: PHARMACY | Facility: CLINIC | Age: 31
End: 2021-01-06

## 2021-01-07 NOTE — PROGRESS NOTES
This is a recent snapshot of the patient's [facility] medical record.  For current drug dose and complete information and questions, call [phone] or In Basket pool, fv home infusion ([id number])  CSN Number:  [id number]

## 2021-01-08 ENCOUNTER — HOME INFUSION (PRE-WILLOW HOME INFUSION) (OUTPATIENT)
Dept: PHARMACY | Facility: CLINIC | Age: 31
End: 2021-01-08

## 2021-01-08 NOTE — PROGRESS NOTES
Skilled Nurse visit in the  patient home to administer Privigen IV via CADD pump over approximately 5 hours and 40 minutes every 21 days.   No recent elevated temperature, fever, chills, productive cough, coughing for 3 weeks or longer or hemoptysis, abnormal vital signs, night sweats, chest pain. No  decrease in your appetite, unexplained weight loss or fatigue.  No other new onset medical symptoms.  Current weight 253Lb.  PIV placed R hand, [id number].  Pre medicated with Acetaminophen 650 mg by mouth, 30 minutes prior to infusion.    Diphenhydramine 50 mg by mouth, 30 minutes prior to infusion.    Hydrocortisone 100 mg IV push over 2-5 minutes, 30 minutes prior to infusion.  . No labs ordered. Infusion completed without complication or reaction. 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      JONATHAN BrownN RN  Field Clinician  [facility]   [address] [place] [address]  [email or web address]  [email or web address]   Cell: [phone] Fax [phone]

## 2021-01-10 DIAGNOSIS — E87.6 HYPOKALEMIA: ICD-10-CM

## 2021-01-11 ENCOUNTER — MYC MEDICAL ADVICE (OUTPATIENT)
Dept: FAMILY MEDICINE | Facility: CLINIC | Age: 31
End: 2021-01-11

## 2021-01-11 DIAGNOSIS — I26.09 OTHER PULMONARY EMBOLISM WITH ACUTE COR PULMONALE, UNSPECIFIED CHRONICITY (H): Primary | ICD-10-CM

## 2021-01-12 RX ORDER — POTASSIUM CHLORIDE 1500 MG/1
20 TABLET, EXTENDED RELEASE ORAL 2 TIMES DAILY
Qty: 2 TABLET | Refills: 0 | OUTPATIENT
Start: 2021-01-12

## 2021-01-12 NOTE — TELEPHONE ENCOUNTER
Dr Sterling noted potassium was given only as a short term supplement and there is no indication to continue. See telephone encounter 10/12.        Lorri Saenz RN, BSN, [facility]

## 2021-01-13 ENCOUNTER — OFFICE VISIT (OUTPATIENT)
Dept: NEUROLOGY | Facility: CLINIC | Age: 31
End: 2021-01-13
Payer: COMMERCIAL

## 2021-01-13 VITALS
DIASTOLIC BLOOD PRESSURE: 93 MMHG | HEIGHT: 67 IN | WEIGHT: 253 LBS | HEART RATE: 99 BPM | BODY MASS INDEX: 39.71 KG/M2 | SYSTOLIC BLOOD PRESSURE: 128 MMHG | OXYGEN SATURATION: 100 % | RESPIRATION RATE: 16 BRPM

## 2021-01-13 DIAGNOSIS — G54.9 SENSORY GANGLIONOPATHY: ICD-10-CM

## 2021-01-13 DIAGNOSIS — G61.81 CIDP (CHRONIC INFLAMMATORY DEMYELINATING POLYNEUROPATHY) (H): ICD-10-CM

## 2021-01-13 DIAGNOSIS — G54.9 SENSORY GANGLIONOPATHY: Primary | ICD-10-CM

## 2021-01-13 DIAGNOSIS — G61.81 CIDP (CHRONIC INFLAMMATORY DEMYELINATING POLYNEUROPATHY) (H): Primary | ICD-10-CM

## 2021-01-13 DIAGNOSIS — E55.9 VITAMIN D DEFICIENCY: ICD-10-CM

## 2021-01-13 LAB
DEPRECATED CALCIDIOL+CALCIFEROL SERPL-MC: 42 UG/L (ref 20–75)
FOLATE SERPL-MCNC: 83 NG/ML
VIT B12 SERPL-MCNC: 315 PG/ML (ref 193–986)

## 2021-01-13 PROCEDURE — 82746 ASSAY OF FOLIC ACID SERUM: CPT | Mod: 90 | Performed by: PATHOLOGY

## 2021-01-13 PROCEDURE — 95913 NRV CNDJ TEST 13/> STUDIES: CPT | Performed by: PSYCHIATRY & NEUROLOGY

## 2021-01-13 PROCEDURE — 36415 COLL VENOUS BLD VENIPUNCTURE: CPT | Performed by: PATHOLOGY

## 2021-01-13 PROCEDURE — 84207 ASSAY OF VITAMIN B-6: CPT | Mod: 90 | Performed by: PATHOLOGY

## 2021-01-13 PROCEDURE — 99000 SPECIMEN HANDLING OFFICE-LAB: CPT | Performed by: PATHOLOGY

## 2021-01-13 PROCEDURE — 82607 VITAMIN B-12: CPT | Performed by: PATHOLOGY

## 2021-01-13 PROCEDURE — 99214 OFFICE O/P EST MOD 30 MIN: CPT | Mod: 25 | Performed by: PSYCHIATRY & NEUROLOGY

## 2021-01-13 PROCEDURE — 82306 VITAMIN D 25 HYDROXY: CPT | Mod: 90 | Performed by: PATHOLOGY

## 2021-01-13 PROCEDURE — 82525 ASSAY OF COPPER: CPT | Mod: 90 | Performed by: PATHOLOGY

## 2021-01-13 PROCEDURE — 84446 ASSAY OF VITAMIN E: CPT | Mod: 90 | Performed by: PATHOLOGY

## 2021-01-13 PROCEDURE — 84425 ASSAY OF VITAMIN B-1: CPT | Mod: 90 | Performed by: PATHOLOGY

## 2021-01-13 ASSESSMENT — MIFFLIN-ST. JEOR: SCORE: 1900.23

## 2021-01-13 ASSESSMENT — PAIN SCALES - GENERAL: PAINLEVEL: WORST PAIN (10)

## 2021-01-13 NOTE — PROGRESS NOTES
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 This study is to evaluate for interval changes in her neuropathy.     Techniques: Sensory and motor conduction studies were done with surface recording electrodes.      Results:    Nerve conduction studies:   1. Bilateral median-D2 and ulnar-D5 sensory responses show slowed CV and severely reduced amplitudes.   2. Bilateral radial sensory responses show normal CV and severe (right) or moderate (left) reduced amplitudes.   3. Bilateral SP sensory responses show normal CV and mildly reduced amplitudes.   4. Bilateral sural sensory responses are normal.   5. Right ulnar-ADM motor response show normal DL, normal amplitude and mild CV slowing across the elbow.   6. Right median-APB, peroneal-EDB, and tibial-AH motor responses are normal.       Interpretation:  This is an abnormal study. There is again electrophysiological evidence of a non length-dependent sensory neuropathy or ganglionopathy. Compared to prior studies performed 8/3/20 and 7/23/20 there has been unequivocal interval improvement in sensory response amplitudes in the lower > upper limbs. Incidental note is also made of a mild right sided ulnar neuropathy at the elbow on today's study.      Travon Chua MD  Department of Neurology            Sensory NCS        Nerve / Sites Rec. Site Onset Peak NP Amp Ref. PP Amp Dist Javier Ref. Temp     ms ms  V  V  V cm m/s m/s  C   R MEDIAN - Dig II Anti      Wrist Dig II 3.18 4.38 2.9 10.0 3.5 14 44.1 48.0 32.5   L MEDIAN - Dig II Anti      Wrist Dig II 3.18 4.01 2.2 10.0 4.6 14 44.1 48.0 32.5   R ULNAR -  Dig V Anti      Wrist Dig V 2.71 3.59 1.4 8.0 1.9 12.5 46.2 48.0    L ULNAR - Dig V Anti      Wrist Dig V 3.44 3.91 2.8 8.0 4.5 12.5 36.4 48.0    R RADIAL - Snuff      Forearm Snuff 1.93 2.66 3.1 15.0 3.2 10 51.9 48.0 32.3   L RADIAL - Snuff      Forearm Snuff 2.60 3.39 8.5 15.0 6.8 12.5 48.0 48.0 32.5   R SURAL - Lat Mall      Calf Ankle 3.13 4.01 10.2 8.0 8.7 14 44.8 38.0 31.4   L SURAL - Lat Mall      Calf Ankle 3.54 4.43 8.5 8.0 8.2 14 39.5 38.0 31.5   R SUP PERONEAL      Lat Leg Woo 2.76 3.96 2.1  5.5 12.5 45.3 38.0 31.5   L SUP PERONEAL      Lat Leg Woo 3.13 4.32 3.1  4.0 12.5 40.0 38.0 31.5       Motor NCS      Nerve / Sites Rec. Site Lat Ref. Amp Ref. Rel Amp Dist Javier Ref. Dur. Area Temp.     ms ms mV mV % cm m/s m/s ms %  C   R MEDIAN - APB      Wrist APB 3.65 4.40 8.0 5.0 100 8   4.17 100 32.5      Elbow APB 8.39  7.2  90.3 23 48.5 48.0 7.19 103 32.4   R ULNAR - ADM      Wrist ADM 3.33 3.50 9.4 5.0 100 8   6.04 100 32.5      B.Elbow ADM 7.24  9.4  100 21 53.8 48.0 6.35 98 32.5      A.Elbow ADM 9.69  8.9  95 10 40.9 48.0 6.93 89.6 32.5      Axilla ADM 11.51  8.8  93.7 11 60.3 48.0 6.46 86.3 32.5   R DEEP PERONEAL - EDB      Ankle EDB 5.16 6.00 7.2 2.5 100 8   6.46 100 31.5      FibHead EDB 12.60  6.5  90 33 44.3 38.0 7.08 97.2 31.4      Pop Fos EDB 14.53  6.3  86.6 8.5 44.1 38.0 7.71 94.2 31.4   R TIBIAL - AH      Ankle AH 5.00 6.00 12.2 4.0 100 8   6.77 100 31.5      Pop Fos AH 14.38  9.8  80.2 39 41.6 38.0 8.59 87.2 31.4       F  Wave      Nerve Min F Lat Max F Lat Mean FLat Temp.    ms ms ms  C   R MEDIAN 28.96 29.32 29.16 32.3

## 2021-01-13 NOTE — NURSING NOTE
Chief Complaint   Patient presents with     RECHECK     [facility] RETURN - MUSCULAR DYSTROPHY      Carly Espinal, EMT

## 2021-01-13 NOTE — LETTER
"1/13/2021       RE: Hilaria Bonner  [address]  [facility] [address]     Dear Colleague,    Thank you for referring your patient, Hilaria Bonner, to the [facility] NEUROLOGY CLINIC [place] at [facility]. Please see a copy of my visit note below.    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  In April 2020 she developed confirmed COVID infection. About 4 weeks later her neurologic symptoms began. Her first symptom was tingling in her hands where it then  progressed to a burning in her hands about 4-5 days after the numbness.  It then quickly progressed to involving bilateral legs below the knee and then her feet. She felt weak in her hands and feet. Fine motor tasks and gait became impaired. She has trouble picking up objects because she has to watch herself  objects. She needed a walker. She also felt that her speech became periodically slurred. The sensory symptoms that included pain and allodynia were most problematic in her hands and feet. NCS in 7/20 showed absent sensory responses in the upper and lower limbs and normal motor responses. In 8/20 IVIG 2 gm/kg loading and then 1 gm/kg q 3 week maintenance was started. Through the fall 2020 she made slow improvements, particular in function and gait. By 11/20 she felt \"50%\" better.     Interval History:   I last saw her 11/18/20. She has some concerns today. She is worried that some of her symptoms are getting worse. She still has numbness below her knees and wrists. The distribution is about the same as before, but the pain is more intense. She wonders if this is related to cold. Her gait is actually doing quite good. She comes to clinic today unassisted and without any gait support device. She has a walker, but does no use it very often. She " still works with PT. No tremor. Strength is still good. She has been seen in the pain clinic with some improvements to her pain. She takes Lyrica 200mg TID and Cymbalta 60mg BID. She stopped accupuncture. Her last IVIG was a week ago. She is not sure how much it is benefiting her at this point. She does not notice any treatment related fluctuations.     Prior pertinent laboratory work-up:  5/2020:  ANH 1:160. Vitamin B1 low (45). B12 low/normal (285). Negative/normal double stranded DNA, ANCA, C-reactive, RF, GM1, GD1, Gq1b, vitamin A, B6, Vit E, SSA, SSB undetectable.  6/2020 CSF: 0 WBC, 0 RBC, protein 58 glucose 29.  7/2020: Normal Vitamin B1, B12, folate    8/2020: TS-H[place] elevated [place] [address] (N<[id number]). Negative FGFR3, Immunofixation, paraneoplastic panel,GD1a.     Prior electrophysiologic work-up:  7/23/20 NCS/EMG showed all absent upper and lower limb SNAPS and all normal upper and lower limb motor responses.   8/3/20 NCS/EMG showed absent right  median, ulnar, and sural sensory studies with radial having attenuated amplitude.      Prior pertinent imaging work-up:  5/20: MRI brain with and without contrast was essentially normal  5/20: MRI C spine with and without contrast showed no abnormal enhancement in the spinal cord, thecal sac or cervical vertebrae. No spinal canal or neural foraminal narrowing.    Past Medical History:   Past Medical History:   Diagnosis Date     Acute kidney injury (H) 05/13/2019     Cardiac arrest (H) 05/13/2019     Earache or other ear, nose, or throat complaint 12/15    tyroid     Pulmonary embolus (H) 05/13/2019     Past Surgical History:  Past Surgical History:   Procedure Laterality Date     GYN SURGERY      2 C-sections     KNEE SURGERY  most recently - [id number]    3 surgeries in [place]     LAPAROSCOPIC GASTRIC SLEEVE N/A 3/26/2019    Procedure: Laparoscopic Sleeve Gastrectomy;  Surgeon: Luan Lopez MD;  Location: [facility] OR     ORTHOPEDIC SURGERY      2 knee meniscus  surgery     Family history:    There is no known family history of hereditary neuropathies or other neuromuscular disorders.     Social History:    Social History     Tobacco Use     Smoking status: Current Every Day Smoker     Packs/day: 0.30     Years: 1.00     Pack years: 0.30     Types: Cigarettes     Start date: 11/20/2016     Last attempt to quit: 1/12/2018     Years since quitting: 3.0     Smokeless tobacco: Never Used   Substance Use Topics     Alcohol use: Not Currently     Alcohol/week: 0.0 standard drinks     Comment: occasional     Drug use: No      Medical Allergies:   No Known Allergies     Current Medications:    Current Outpatient Medications:      cholecalciferol 50 MCG (2000 UT) tablet, Take 1 tablet by mouth daily, Disp: 90 tablet, Rfl: 3     diphenhydrAMINE (BENADRYL) 50 MG/ML injection, , Disp: , Rfl:      DULoxetine (CYMBALTA) 30 MG capsule, , Disp: , Rfl:      DULoxetine (CYMBALTA) 60 MG capsule, Take 1 capsule (60 mg) by mouth 2 times daily, Disp: 60 capsule, Rfl: 1     EPINEPHrine (ANY BX GENERIC EQUIV) 0.3 MG/0.3ML injection 2-pack, , Disp: , Rfl:      folic acid (FOLVITE) 1 MG tablet, Take 1 tablet (1 mg) by mouth daily, Disp: 30 tablet, Rfl: 11     HYDROcodone-acetaminophen (NORCO) 5-325 MG tablet, , Disp: , Rfl:      hydrOXYzine (ATARAX) 25 MG tablet, Take 1-2 tablets (25-50 mg) by mouth every 6 hours as needed for anxiety or other (adjuvant pain), Disp: 90 tablet, Rfl: 1     lisinopril (ZESTRIL) 10 MG tablet, Take 1 tablet (10 mg) by mouth daily, Disp: 90 tablet, Rfl: 0     Multiple Vitamins-Minerals (MULTIVITAMIN ADULTS) TABS, Take 1 tablet by mouth daily, Disp: , Rfl:      NEW MED, MEDICAL CANABIS, Disp: , Rfl:      omeprazole (PRILOSEC) 20 MG DR capsule, Take 1 capsule (20 mg) by mouth daily, Disp: 28 capsule, Rfl: 11     ondansetron (ZOFRAN-ODT) 4 MG ODT tab, Take 1 tablet (4 mg) by mouth every 8 hours as needed for nausea, Disp: 30 tablet, Rfl: 1     polyethylene glycol (MIRALAX)  "17 GM/SCOOP powder, Take 17 g (1 capful) by mouth daily, Disp: 850 g, Rfl: 3     Pregabalin (LYRICA) 200 MG capsule, Take 1 capsule (200 mg) by mouth 3 times daily, Disp: 90 capsule, Rfl: 3     PRIVIGEN 20 GM/200ML SOLN, , Disp: , Rfl:      PRIVIGEN 40 GM/400ML SOLN, , Disp: , Rfl:      QUEtiapine (SEROQUEL) 25 MG tablet, , Disp: , Rfl:      rivaroxaban ANTICOAGULANT (XARELTO ANTICOAGULANT) 20 MG TABS tablet, Take 1 tablet (20 mg) by mouth daily (with dinner), Disp: 90 tablet, Rfl: 1     SOLU-CORTEF 100 MG injection, , Disp: , Rfl:      vitamin (B COMPLEX) tablet, Take 1 tablet by mouth daily, Disp: 90 tablet, Rfl: 3     vitamin C (ASCORBIC ACID) 1000 MG TABS, Take 1 tablet (1,000 mg) by mouth daily, Disp: 30 tablet, Rfl: 0     vitamin D3 (CHOLECALCIFEROL) 2000 units (50 mcg) tablet, Take 1 tablet (2,000 Units) by mouth daily, Disp: 30 tablet, Rfl: 0     buPROPion (WELLBUTRIN XL) 150 MG 24 hr tablet, , Disp: , Rfl:      topiramate (TOPAMAX) 50 MG tablet, , Disp: , Rfl:     Current Facility-Administered Medications:      cyanocobalamin injection 1,000 mcg, 1,000 mcg, Intramuscular, Q30 Days, Crissy Madrigal PA-C, 1,000 mcg at 11/10/20 1043     medroxyPROGESTERone (DEPO-PROVERA) syringe 150 mg, 150 mg, Intramuscular, Q90 Days, Crissy Madrigal PA-C, 150 mg at 01/04/21 0947    Review of Systems: A complete review of systems was obtained and was negative except for what was noted above.     Physical examination:    BP (!) 128/93   Pulse 99   Resp 16   Ht 1.702 m (5' 7\")   Wt 114.8 kg (253 lb)   SpO2 100%   BMI 39.63 kg/m      General Appearance: NAD    Skin: There are no rashes or other skin lesions.    Musculoskeletal:  There is no scoliosis, lordosis, kyphosis, pes cavus, or hammertoes.    Neurologic examination:    Mental status:  Patient is alert, attentive, and oriented x 3.  Language is coherent and fluent without aphasia.  Memory, comprehension and ability to follow commands were intact.   "     Cranial nerves:   Pupils were round and reacted to light.  Extraocular movements were full. There was no face, jaw, palate or tongue weakness or atrophy. Hearing was grossly intact.  Shoulder shrug was normal.       Motor exam: No atrophy or fasciculations.   Manual muscle testing revealed the following MRC grade muscle power:   Right Left   Neck flexion 5    Neck extension: 5    Shoulder abduction:  5 5   Elbow extension: 5 5   Elbow flexion:  5 5   Wrist flexion:  5 5   Wrist extension:  5 5   FDI 4+ 4+   Hip flexion 5 5   Knee flexion 5 5   Knee extension 5 5   Dorsiflexion 5 5   Plantar flexion 5 5   Green indicates improved compared to last exam  Red indicates worse compared to last exam    Complex motor skills: Mild postural hand tremor. Mild ataxia with FNF.    Sensory exam: Pin decreased in distal leg, starting below the knee. Pin also reduced below the elbow bilaterally  Vibration absent at toe and ankle, 3s at knee, absent at finger and reduced wrist.     Gait: Base is narrow base, stable. No ataxia.     Deep tendon reflexes:   Right Left   Triceps 1 1   Biceps 1 1   Brachioradialis 2 2   Knee jerk 0 0   Ankle jerk 0 0     Neuropathy Assessments  Neurology Assessments 2021 2020 2020 8/3/2020   RODS CIDP/MGUSP Score 32 29 16 20   Time for 'Up and Go' test- Seconds: 9.79 10.2 15.9 24.09      Strength: 2021 2020 2020 8/3/2020   Right hand strength in K 18 20 16   Left hand strength in K 17 22 20     Immunotherapy 2021 2020 2020 8/3/2020   Time since last IVIG (days): 1 week 12 5 days -   Current treatment: IVIG 1 gm/kg q 3 weeks IVIG 1gm/kg i0sofee IVIG 1 gm/kg q 3 weeks none     Assessment:    Hilaria Bonner is a 30 year old woman with a 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 Although neuropathy due to nutritional deficiencies has also been considered, this is less likely. Noted also was a  TS-HDS antibody, which is of uncertain significance. It is reassuring that she has had unequivocal improvement over the last several months, as is clearly documented by improvements in disability (I-RODS) and gait (TUG time). I am not sure what to make of her recent subjective changes, but I think those are unlikely to indicate that her neuropathy is worsening. We plan to repeat the NCS later today. Provided those studies look as good or better than the previous testing we will begin IVIG optimization as below. Management of residual deficits remains supportive, and I am glad to find that she is approaching pain in a multidisciplinary setting.      Plan:      1. NCS today to assess for interval change.   2. IVIG: Reduce from 1 g/kg q 3 weeks to 1 gm/kg q 4 weeks. Additional taper pending clinical outcome.   3. Pain, improving: Continue follow up in the multidisciplinary pain clinic. Appreciate collateral care.   4. Gait, improving: Encouraged continue physical therapy  5. Labs: repeat B1, B6, B12, folate, copper, vit E today. I also encouraged continued healthy, well balanced nutrition.   6. Follow up in 2 months. Sooner if needed.   ----  ADDENDUM:   1/13/21: NCS still showed a non length-dependent sensory neuropathy or ganglionopathy, but compared to prior studies performed 8/3/20 and 7/23/20 there has been unequivocal interval improvement in sensory response amplitudes in the lower > upper limbs.     The NCS results are encouarging. Will proceed with the IVIG optimization plan as above, but with a low threshold to increase IVIg back to q 3 weeks if there is clinical decline.         Again, thank you for allowing me to participate in the care of your patient.      Sincerely,    Travon Chua MD

## 2021-01-13 NOTE — LETTER
1/13/2021       RE: Hilaria Bonner  [address]  [facility] [address]     Dear Colleague,    Thank you for referring your patient, Hilaria Bonner, to the [facility] EMG CLINIC [place] at [facility] Please see a copy of my visit note below.        [facility]  Electrodiagnostic Laboratory    Nerve Conduction & EMG Report              Patient:       Hilaria Bonner  Patient ID:    [id number]  Gender:        Female  YOB: 1990  Age:           30 Years 0 Months        History & Examination:  30 year old woman who developed a non-length dependent sensory predominant neuropathy or ganglionopathy after she developed COVID in April 2020. This study is to evaluate for interval changes in her neuropathy.     Techniques: Sensory and motor conduction studies were done with surface recording electrodes.      Results:    Nerve conduction studies:   1. Bilateral median-D2 and ulnar-D5 sensory responses show slowed CV and severely reduced amplitudes.   2. Bilateral radial sensory responses show normal CV and severe (right) or moderate (left) reduced amplitudes.   3. Bilateral SP sensory responses show normal CV and mildly reduced amplitudes.   4. Bilateral sural sensory responses are normal.   5. Right ulnar-ADM motor response show normal DL, normal amplitude and mild CV slowing across the elbow.   6. Right median-APB, peroneal-EDB, and tibial-AH motor responses are normal.       Interpretation:  This is an abnormal study. There is again electrophysiological evidence of a non length-dependent sensory neuropathy or ganglionopathy. Compared to prior studies performed 8/3/20 and 7/23/20 there has been unequivocal interval improvement in sensory response amplitudes in the lower > upper limbs. Incidental note is also made of a mild right sided ulnar neuropathy at the elbow on today's study.      Travon Chua MD  Department of Neurology             Sensory NCS        Nerve / Sites Rec. Site Onset Peak NP Amp Ref. PP Amp Dist Javier Ref. Temp     ms ms  V  V  V cm m/s m/s  C   R MEDIAN - Dig II Anti      Wrist Dig II 3.18 4.38 2.9 10.0 3.5 14 44.1 48.0 32.5   L MEDIAN - Dig II Anti      Wrist Dig II 3.18 4.01 2.2 10.0 4.6 14 44.1 48.0 32.5   R ULNAR - Dig V Anti      Wrist Dig V 2.71 3.59 1.4 8.0 1.9 12.5 46.2 48.0    L ULNAR - Dig V Anti      Wrist Dig V 3.44 3.91 2.8 8.0 4.5 12.5 36.4 48.0    R RADIAL - Snuff      Forearm Snuff 1.93 2.66 3.1 15.0 3.2 10 51.9 48.0 32.3   L RADIAL - Snuff      Forearm Snuff 2.60 3.39 8.5 15.0 6.8 12.5 48.0 48.0 32.5   R SURAL - Lat Mall      Calf Ankle 3.13 4.01 10.2 8.0 8.7 14 44.8 38.0 31.4   L SURAL - Lat Mall      Calf Ankle 3.54 4.43 8.5 8.0 8.2 14 39.5 38.0 31.5   R SUP PERONEAL      Lat Leg Woo 2.76 3.96 2.1  5.5 12.5 45.3 38.0 31.5   L SUP PERONEAL      Lat Leg Woo 3.13 4.32 3.1  4.0 12.5 40.0 38.0 31.5       Motor NCS      Nerve / Sites Rec. Site Lat Ref. Amp Ref. Rel Amp Dist Javier Ref. Dur. Area Temp.     ms ms mV mV % cm m/s m/s ms %  C   R MEDIAN - APB      Wrist APB 3.65 4.40 8.0 5.0 100 8   4.17 100 32.5      Elbow APB 8.39  7.2  90.3 23 48.5 48.0 7.19 103 32.4   R ULNAR - ADM      Wrist ADM 3.33 3.50 9.4 5.0 100 8   6.04 100 32.5      B.Elbow ADM 7.24  9.4  100 21 53.8 48.0 6.35 98 32.5      A.Elbow ADM 9.69  8.9  95 10 40.9 48.0 6.93 89.6 32.5      Axilla ADM 11.51  8.8  93.7 11 60.3 48.0 6.46 86.3 32.5   R DEEP PERONEAL - EDB      Ankle EDB 5.16 6.00 7.2 2.5 100 8   6.46 100 31.5      FibHead EDB 12.60  6.5  90 33 44.3 38.0 7.08 97.2 31.4      Pop Fos EDB 14.53  6.3  86.6 8.5 44.1 38.0 7.71 94.2 31.4   R TIBIAL - AH      Ankle AH 5.00 6.00 12.2 4.0 100 8   6.77 100 31.5      Pop Fos AH 14.38  9.8  80.2 39 41.6 38.0 8.59 87.2 31.4       F  Wave      Nerve Min F Lat Max F Lat Mean FLat Temp.    ms ms ms  C   R MEDIAN 28.96 29.32 29.16 32.3                                        Again, thank you for  allowing me to participate in the care of your patient.      Sincerely,    Travon Chua MD

## 2021-01-14 ENCOUNTER — VIRTUAL VISIT (OUTPATIENT)
Dept: ENDOCRINOLOGY | Facility: CLINIC | Age: 31
End: 2021-01-14
Payer: COMMERCIAL

## 2021-01-14 VITALS — WEIGHT: 253 LBS | HEIGHT: 67 IN | BODY MASS INDEX: 39.71 KG/M2

## 2021-01-14 DIAGNOSIS — E66.812 CLASS 2 SEVERE OBESITY DUE TO EXCESS CALORIES WITH SERIOUS COMORBIDITY AND BODY MASS INDEX (BMI) OF 39.0 TO 39.9 IN ADULT (H): Primary | ICD-10-CM

## 2021-01-14 DIAGNOSIS — Z98.84 S/P LAPAROSCOPIC SLEEVE GASTRECTOMY: ICD-10-CM

## 2021-01-14 DIAGNOSIS — E66.01 CLASS 2 SEVERE OBESITY DUE TO EXCESS CALORIES WITH SERIOUS COMORBIDITY AND BODY MASS INDEX (BMI) OF 39.0 TO 39.9 IN ADULT (H): Primary | ICD-10-CM

## 2021-01-14 DIAGNOSIS — Z86.39 HISTORY OF MORBID OBESITY: ICD-10-CM

## 2021-01-14 PROCEDURE — 99213 OFFICE O/P EST LOW 20 MIN: CPT | Mod: 95 | Performed by: PHYSICIAN ASSISTANT

## 2021-01-14 RX ORDER — BIOTIN 10 MG
1 TABLET ORAL 2 TIMES DAILY
Qty: 60 TABLET | Refills: 11 | Status: SHIPPED | OUTPATIENT
Start: 2021-01-14 | End: 2021-12-01

## 2021-01-14 ASSESSMENT — PAIN SCALES - GENERAL: PAINLEVEL: NO PAIN (0)

## 2021-01-14 ASSESSMENT — MIFFLIN-ST. JEOR: SCORE: 1900.23
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
## 2021-01-14 NOTE — ASSESSMENT & PLAN NOTE
Bariatric follow up 1/14/20 with Joanne Sterling PA-C  Vomiting improved after slowing down and eating less  Covid April 2019 and nerve pain since.  Started B complex and MVI twice daily without iron  GERD controlled on omeprazole 20mg daily  Weight 253 lbs today  Down from 330 highest to 253 lbs today.  Has done well on topiramate in the past. Stopped by patient and other providers due to not being effective for weight loss or nerve pain. It may have been helping to stablize weight as she has had 10 lb wt gain since stopping. May reconsider starting in the future but would discuss with neurologist first.      MEDICATIONS:  Continue current medications without change  FUTURE APPOINTMENTS:       - Follow-up visit in 3 months return MWM with Joanne  SELF MONITORING: monitor weight at least weekly

## 2021-01-14 NOTE — NURSING NOTE
"Chief Complaint   Patient presents with     RECHECK     Follow up shameka.       Vitals:    01/14/21 0808   Weight: 114.8 kg (253 lb)   Height: 1.702 m (5' 7\")       Body mass index is 39.63 kg/m .                            SHADI DUNCAN, EMT    "

## 2021-01-15 LAB — COPPER SERPL-MCNC: 175.5 UG/DL (ref 80–155)

## 2021-01-16 LAB
A-TOCOPHEROL VIT E SERPL-MCNC: 11.8 MG/L (ref 5.5–18)
BETA+GAMMA TOCOPHEROL SERPL-MCNC: 1.1 MG/L (ref 0–6)
VIT B1 BLD-MCNC: 193 NMOL/L (ref 70–180)
VIT B6 SERPL-MCNC: 182.8 NMOL/L (ref 20–125)

## 2021-01-18 DIAGNOSIS — E87.6 HYPOKALEMIA: ICD-10-CM

## 2021-01-18 DIAGNOSIS — I10 BENIGN ESSENTIAL HYPERTENSION: ICD-10-CM

## 2021-01-18 DIAGNOSIS — F32.1 MODERATE MAJOR DEPRESSION (H): Primary | ICD-10-CM

## 2021-01-18 RX ORDER — POTASSIUM CHLORIDE 1500 MG/1
20 TABLET, EXTENDED RELEASE ORAL 2 TIMES DAILY
Qty: 2 TABLET | Refills: 0 | Status: CANCELLED | OUTPATIENT
Start: 2021-01-18

## 2021-01-18 NOTE — TELEPHONE ENCOUNTER
Calling to find out the status of her potassium refill and the script for a blood pressure monitor.  Patient states the BP monitor was requested over a month ago.    Please call asap.  Needs to know these have been taken care of.  OK to LM on VM.

## 2021-01-19 ENCOUNTER — MYC MEDICAL ADVICE (OUTPATIENT)
Dept: PALLIATIVE MEDICINE | Facility: CLINIC | Age: 31
End: 2021-01-19

## 2021-01-19 ENCOUNTER — MYC MEDICAL ADVICE (OUTPATIENT)
Dept: FAMILY MEDICINE | Facility: CLINIC | Age: 31
End: 2021-01-19

## 2021-01-19 DIAGNOSIS — E87.6 HYPOKALEMIA: ICD-10-CM

## 2021-01-19 RX ORDER — POTASSIUM CHLORIDE 1500 MG/1
20 TABLET, EXTENDED RELEASE ORAL 2 TIMES DAILY
Qty: 60 TABLET | Refills: 0 | Status: SHIPPED | OUTPATIENT
Start: 2021-01-19 | End: 2021-01-28

## 2021-01-19 RX ORDER — LISINOPRIL 10 MG/1
10 TABLET ORAL DAILY
Qty: 90 TABLET | Refills: 0 | OUTPATIENT
Start: 2021-01-19

## 2021-01-19 NOTE — TELEPHONE ENCOUNTER
Will leave encounter open for patient response/chart review by nursing.     Terryhart below sent to pt  Aleksey Manrique,     The covering providers took a look at the messages and would like you to make an appointment to discuss a bit further to see what options would be best for you.     Kait CASSIDY, RN Care Coordinator  [facility]  Pain [facility]

## 2021-01-19 NOTE — TELEPHONE ENCOUNTER
Order for blood pressure cuff dme printed- please fax to whatever location she desires  I do not see potassium on med list- what is she taking and current dose

## 2021-01-19 NOTE — TELEPHONE ENCOUNTER
Please clarify with patient if wants refill of seroquel.  Last prescribed by me 11/16/2020 and then discontinued by Joanne Sterling 12/14/2020

## 2021-01-19 NOTE — TELEPHONE ENCOUNTER
Refill x 90 days sent to same requesting pharmacy on 12/31/20. E-prescribe was received, should be on file or patient should have pills till end of March.  Too early to refill or duplicate.    Mimi Marques RN  [facility]

## 2021-01-19 NOTE — TELEPHONE ENCOUNTER
I would recommend that the patient schedule a visit with another provider to discuss treatment options while RG Puri CNP is out.     Yessy Goodwin PA-C on 1/19/2021 at 1:09 PM

## 2021-01-19 NOTE — TELEPHONE ENCOUNTER
Potassium refilled - however recommend scheduling lab only to recheck potassium - currently taking 20 meq twice a day - patient notified via Visto

## 2021-01-19 NOTE — TELEPHONE ENCOUNTER
DMR for BP monitor  Faxed to [id number] - [place]    Routing to MA pool to contact pt regarding potassium medication calcification

## 2021-01-19 NOTE — TELEPHONE ENCOUNTER
Documentation below copied and placed into another open encounter. Closing to avoid duplicates    Kait CASSIDY, RN Care Coordinator  [facility]  Pain [facility]

## 2021-01-19 NOTE — TELEPHONE ENCOUNTER
----------------Mychart Below from pt----------------  I was wondering if I could get a medication for muscle spasms. My hands and feet are getting spasms more than normal.    --------------Mychart below response to pt----------------  Aleksey Manrique,     Have you ever taken a muscle relaxer before? If so, what medication was it and did you find that helpful? If we are able to provide a prescription what pharmacy do you prefer?  Are you noticing that your hands/feet spasm more frequently at a certain time of the day, for example does it occur more at night vs the day?  Janki Goodman is on leave at this time but I will forward your request to a covering provider once I hear back from you. Also,it would be a good time to make a follow up appointment to touch base with how you are doing. As Janki Goodman is out, you would be placed with a covering provider for this appointment. Please call [phone] to make a follow up.     Kait CASSIDY, RN Care Coordinator  [facility]  Pain [facility]

## 2021-01-19 NOTE — TELEPHONE ENCOUNTER
Routing refill request to provider for review/approval because:  Medication is reported/historical      Lorri Saenz RN, BSN, CMSRN  [facility]

## 2021-01-19 NOTE — TELEPHONE ENCOUNTER
Routing to provider pool to review for recommendation,     ----------------Mychart Below from pt----------------  The spasms been coming and going through the entire day. Usually it a happen a few times out the week but I've been having them everyday morning day night. Sometimes it wakes me up through the night. I was supposed to follow up with Janki the 18 of March I believe and I know she's on maternity leave. I have taken Tramadol in the past like in the beginning of my of my neuropathy. I don't recall having muscle spasms at the time of taking the medication. I use the Booster in [facility] and [place].    --------------Mychart below response to pt----------------  Hi, Thanks for the fast reply! I see you are scheduled to see a Jessie Brokellee on 3/16 at 1230. I am not sure who this provider is but its not Janki Goodman. I would set something up to follow up with a covering provider to check in during Janki Goodman's leave.   But I will forward our messages off to covering providers to review for any recommendations for you. We will let you know when we hear back. Thanks!!    Kait CASSIDY, RN Care Coordinator  [facility]  Pain Management      initial messages below  ----------------Mychart Below from pt----------------  I was wondering if I could get a medication for muscle spasms. My hands and feet are getting spasms more than normal.     --------------Mychart below response to pt----------------  Aleksey Manrique,      Have you ever taken a muscle relaxer before? If so, what medication was it and did you find that helpful? If we are able to provide a prescription what pharmacy do you prefer?  Are you noticing that your hands/feet spasm more frequently at a certain time of the day, for example does it occur more at night vs the day?  Janki Goodman is on leave at this time but I will forward your request to a covering provider once I hear back from you. Also,it would be a good time to  make a follow up appointment to touch base with how you are doing. As Janki Goodman is out, you would be placed with a covering provider for this appointment. Please call [phone] to make a follow up.      Kait CASSIDY, RN Care Coordinator  [facility]  Pain Management

## 2021-01-19 NOTE — TELEPHONE ENCOUNTER
Routing FYI to . If DR Chester has any cancellations prior to Friday, please keep pt in mind. Thanks.  Will leave encounter open for patient response/chart review by nursing.     Ok I just set an appointment up for this Friday. If they have any cancellations or no shows I would also be available

## 2021-01-20 DIAGNOSIS — G62.9 POLYNEUROPATHY: ICD-10-CM

## 2021-01-20 RX ORDER — TRAMADOL HYDROCHLORIDE 50 MG/1
50 TABLET ORAL EVERY 6 HOURS PRN
Qty: 20 TABLET | Refills: 0 | OUTPATIENT
Start: 2021-01-20

## 2021-01-20 NOTE — TELEPHONE ENCOUNTER
Routing refill request to provider for review/approval because:  Drug not active on patient's medication list    The original prescription was discontinued on 8/20/2020 by Karie Goodman APRN CNP for the following reason: Alternate therapy. Renewing this prescription may not be appropriate.    Kait Manriquez RN, [facility] Triage

## 2021-01-20 NOTE — TELEPHONE ENCOUNTER
Patient has appointment set up with pain management on 1/22/21. Medications can be discussed at that appointment.     Yessy Goodwin PA-C on 1/20/2021 at 10:33 AM

## 2021-01-20 NOTE — TELEPHONE ENCOUNTER
Patient requesting refill of ultram.  Is followed by pain clinic- leaving direction and medication refill up to them- routing to that provider for review.

## 2021-01-21 ENCOUNTER — VIRTUAL VISIT (OUTPATIENT)
Dept: PALLIATIVE MEDICINE | Facility: CLINIC | Age: 31
End: 2021-01-21
Payer: COMMERCIAL

## 2021-01-21 DIAGNOSIS — R20.2 PARESTHESIAS: ICD-10-CM

## 2021-01-21 DIAGNOSIS — G61.81 CHRONIC INFLAMMATORY DEMYELINATING POLYNEUROPATHY (H): Primary | ICD-10-CM

## 2021-01-21 DIAGNOSIS — M62.838 MUSCLE SPASM: ICD-10-CM

## 2021-01-21 PROCEDURE — 99214 OFFICE O/P EST MOD 30 MIN: CPT | Mod: 95 | Performed by: PHYSICAL MEDICINE & REHABILITATION

## 2021-01-21 RX ORDER — METHOCARBAMOL 500 MG/1
500 TABLET, FILM COATED ORAL 4 TIMES DAILY PRN
Qty: 120 TABLET | Refills: 0 | Status: SHIPPED | OUTPATIENT
Start: 2021-01-21 | End: 2021-02-18

## 2021-01-21 ASSESSMENT — PAIN SCALES - GENERAL: PAINLEVEL: WORST PAIN (10)

## 2021-01-21 NOTE — PATIENT INSTRUCTIONS
1. Start methocarbamol 500 mg up to four times daily as needed. Can be sedating.  Do not drive until you know how the medication affects you. If you are tolerating it well you may increase to 1,000 mg up to four times daily as needed.   2. No other medication changes today.   3. You should call to re-schedule pain psychology appointment  4. Continue physical therapy  5. Continue utilizing the TENS unit.   6. Follow up with me for a virtual visit at your previously scheduled appointment on 3/18.     Take care,    Sheree Chester MD  [facility] Pain Management     ----------------------------------------------------------------  Clinic Number:  [phone]     Call with any questions about your care and for scheduling assistance.     Calls are returned Monday through Friday between 8 AM and 4:30 PM. We usually get back to you within 2 business days depending on the issue/request.    If we are prescribing your medications:    For opioid medication refills, call the clinic or send a Hotswap message 7 days in advance.  Please include:    Name of requested medication    Name of the pharmacy.    For non-opioid medications, call your pharmacy directly to request a refill. Please allow 3-4 days to be processed.     Per [place] State Law:    All controlled substance prescriptions must be filled within 30 days of being written.      For those controlled substances allowing refills, pickup must occur within 30 days of last fill.      We believe regular attendance is key to your success in our program!      Any time you are unable to keep your appointment we ask that you call us at least 24 hours in advance to cancel.This will allow us to offer the appointment time to another patient.     Multiple missed appointments may lead to dismissal from the clinic.

## 2021-01-21 NOTE — PROGRESS NOTES
Hilaria is a 30 year old who is being evaluated via a billable video visit.      How would you like to obtain your AVS? MyChart  If the video visit is dropped, the invitation should be resent by: Text to cell phone: .[phone]  Will anyone else be joining your video visit? [facility]   Pain Management Center    Date of Visit: 1/21/2021.    The patient was last seen by RG Mccoy CNP on 12/21/2021.    Recommendations at last visit:   1.  Pain Physical Therapy:    Encouraged Hilaria continue neuro physical therapy and occupational therapy on a regular basis as recommended and as planned. Could consider pain physical therapy once completed.    2.  Pain Psychologist to address relaxation, behavioral change, coping style, and other factors important to improvement.      Encouraged she continue visits with Jasmin Prince PsyD, LP. I am hopeful these resources will provide some benefit.    3.  Medication Management:     1. Unclear of degree of benefit from Cymbalta. She was at 120mg daily for about 6 weeks and we then decreased it due to concern for sweating. As sweating did not improve, Hilaria increased on her back to 120mg daily, was hopeful that this would help with pain. We discussed in the future she should not make medication changes without reaching out. Advised she continue Cymbalta 120mg daily for now. If we do not see significant pain or mood benefit in the next two months, we will likely taper off.      2. Continue Lyrica 200mg TID- max dose. We discussed that there are few medications left that I would recommend starting. May consider a cross taper to Effexor if interested. Amitriptyline was not helpful. Muscle relaxants have not been helpful.      3. Continue medical cannabis as able/finances allow. I would suggest reaching out to the pharmacist with the dispensary to see which products or adjustments they may make.    4.  Potential procedures: not at this  "time.     5.  Referrals: continue evaluation with neurology as planned- has repeat EMGs scheduled in January. Again recommended the purchase of a TENS unit, she is interested.    6.  Follow up with Dr. Chester while I am on maternity leave, in 6-8 weeks. Unfortunately we have not made significant progress since referral in July. We discussed this today. If not making progress in another couple of months unfortunately may refer her back to referring provider for ongoing management.        Additional provider notes:  - Pain has been worse since the last visit. She reports having more spasms in the hands and feet. She then gets \"licha horses in calves\". Usually has them 3-4 times during the week and now is daily. Can occur at anytime. Nothing in particular aggravates it. They last from 30 seconds to 1 minutes.   - She recently had f/u with neurology and had another NCS/EMG earlier this month. There is some improvement in her nerves.  - she is still doing PT and OT once every 2 weeks. Does HEP daily.   - She forgot to schedule pain psychology so has not started that yet.   - Got the TENS unit. PT showed her how to use it and so now she is trying to use it twice a day.      Current pain medications:              Lyrica 200mg TID- [facility], \"a little bit\"               Cymbalta 120mg daily- ?, we tried decreasing to 90mg daily without improvement in sweating so recently increased to 120mg daily              Hydroxyzine 25-50mg prn- H for anxiety, ran out of              Folate 1mg daily    Medical cannabis (starter kit- CBD dominant capsules/vapor, CBD/THC capsules/pills, and THC dominant capsules/vapor) - H with sleep but not pain.      1. Previous Pain Relevant Medications:  NOTE: This medication information taken from patient's intake form, not medical records.               Opiates: Tramadol- [facility], oxycodone- ?/[facility]              NSAIDS: doesn't take anti-inflammatories due to gastric bypass              Muscle Relaxants: " "tizanidine- H for sleep only              Anti-migraine mediations: no              Anti-depressants: amitriptyline (up to 75mg)- ?, \"it put me to sleep\", Cymbalta- ?              Sleep aids: no              Anxiolytics: hydroxyzine- H               Neuropathics: Lyrica- [facility], gabapentin (started on for numbness in toes and migraines after cardiac arrest)- SE, fatigue, Topamax- H for weight loss, NH for pain              Topicals: gabapentin cream- NH/[facility], lidocaine cream- NH, W, burning, capsaicin cream- NH, W, burning              Other medications not covered above: Tylenol- NH     2. Physical Therapy: going to neuro PT and OT   3. Pain Psychology: yes Jasmin Prince PsyD, LP x 3 visit   4. Surgery: gastric bypass March 2019  5. Injections: no  6. Chiropractic: no  7. Acupuncture: yes x4 sessions with José Kunz DC - [place]  8. TENS Unit: no, interested in one    Assessment:  Hilaria Bonner is a 29 year old female with a past medical history significant for PE with associated PEA arrest, morbid obesity s/p sleeve gastrectomy, pancreatitis, and recent COVID 19 who presents with complaints of upper and lower extremity pain.      1. 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   2. Mental Health - the patient's mental health concerns, specifically anxiety, affect her experience of pain and contribute to her clinically significant distress.       Plan:     1.  Pain Physical Therapy:    Encouraged Hilaria continue neuro physical therapy and occupational therapy on a regular basis as recommended and as planned. Could consider pain physical therapy once completed.    2.  Pain Psychologist to address relaxation, behavioral change, coping style, and other factors important to improvement.      Encouraged she continue visits with Jasmin Prince PsyD, LP. I am hopeful these resources will provide some " benefit. She has not scheduled an appointment yet.    3.  Medication Management:     1. Start methocarbamol 500 mg QID prn. If tolerating well may increase to 1,000 mg QID prn.    2. Unclear of degree of benefit from Cymbalta. Advised she continue Cymbalta 120mg daily for now. If we do not see significant pain or mood benefit in the next two months, we will likely taper off.      3. Continue Lyrica 200mg TID- max dose. We discussed that there are few medications left that I would recommend starting. May consider a cross taper to Effexor if interested. Amitriptyline was not helpful. Muscle relaxants have not been helpful.      4. Continue medical cannabis as able/finances allow. I would suggest reaching out to the pharmacist with the dispensary to see which products or adjustments they may make.    4.  Potential procedures: not at this time.     5.  Referrals: None   6.  Continue utilizing the TENS unit.    7.  Follow up with me for the already scheduled appointment on 3/18.     Sheree Chester MD  [facility] Pain Management     Video Start Time: 9:06 am  Video-Visit Details    Type of service:  Video Visit    Video End Time:9:23 am    Originating Location (pt. Location): Home    Distant Location (provider location):  [facility] PAIN MANAGEMENT [place]     Platform used for Video Visit: DigiSynd    BILLING TIME DOCUMENTATION:   The total TIME spent on this patient on the date of the encounter/appointment was 25 minutes.      TOTAL TIME includes:   Time spent preparing to see the patient (reviewing records and tests)  Time spent face to face (or over the phone) with the patient  Time spent ordering tests, medications, procedures and referrals  Time spent documenting clinical information in Epic

## 2021-01-21 NOTE — TELEPHONE ENCOUNTER
Refill request from pharmacy for vitamin B12 sublingual tabs. [employer] message sent to patient for verification of request. Medication not currently active on patient's med list. Waiting for patient response before refilling.

## 2021-01-22 NOTE — TELEPHONE ENCOUNTER
Chart review: Pt LVM to offer sooner appt- no call back, pt also canceled 01/22/21 appt.     Kait CASSIDY, RN Care Coordinator  [facility]  Pain [facility]

## 2021-01-22 NOTE — TELEPHONE ENCOUNTER
Chart review: Pt canceled her follow up    Kait CASSIDY, RN Care Coordinator  [facility]  Pain [facility]

## 2021-01-24 NOTE — TELEPHONE ENCOUNTER
See below.  Please call patient and advise needs to schedule lab only appointment to recheck potassium- has not read [employer] message

## 2021-01-25 NOTE — TELEPHONE ENCOUNTER
This writer attempted to contact pt on 01/25/21      Reason for call schedule nonfasting lab only appointment  and left detailed message.  Also told pt that [employer] message from provider is available to read    If patient calls back:   Schedule Lab appointment within 2 weeks with lab, document that pt called and close encounter         Noemi Wright

## 2021-01-26 NOTE — TELEPHONE ENCOUNTER
Pt informed labs needed.  She will call to schedule at [facility] when she has her therapy there.  She also mentioned that she was approved for a BP machine.    MARISA Hdez.

## 2021-01-27 ENCOUNTER — MYC MEDICAL ADVICE (OUTPATIENT)
Dept: FAMILY MEDICINE | Facility: CLINIC | Age: 31
End: 2021-01-27

## 2021-01-28 ENCOUNTER — HOSPITAL ENCOUNTER (OUTPATIENT)
Dept: OCCUPATIONAL THERAPY | Facility: CLINIC | Age: 31
Setting detail: THERAPIES SERIES
End: 2021-01-28
Attending: PHYSICIAN ASSISTANT
Payer: COMMERCIAL

## 2021-01-28 ENCOUNTER — MYC MEDICAL ADVICE (OUTPATIENT)
Dept: FAMILY MEDICINE | Facility: CLINIC | Age: 31
End: 2021-01-28

## 2021-01-28 ENCOUNTER — HOSPITAL ENCOUNTER (OUTPATIENT)
Dept: PHYSICAL THERAPY | Facility: CLINIC | Age: 31
Setting detail: THERAPIES SERIES
End: 2021-01-28
Attending: PHYSICIAN ASSISTANT
Payer: COMMERCIAL

## 2021-01-28 ENCOUNTER — TELEPHONE (OUTPATIENT)
Dept: PALLIATIVE MEDICINE | Facility: CLINIC | Age: 31
End: 2021-01-28

## 2021-01-28 DIAGNOSIS — R73.01 ELEVATED FASTING GLUCOSE: Primary | ICD-10-CM

## 2021-01-28 DIAGNOSIS — E87.6 HYPOKALEMIA: ICD-10-CM

## 2021-01-28 DIAGNOSIS — R20.2 PARESTHESIAS: ICD-10-CM

## 2021-01-28 LAB
ANION GAP SERPL CALCULATED.3IONS-SCNC: 5 MMOL/L (ref 3–14)
BUN SERPL-MCNC: 7 MG/DL (ref 7–30)
CALCIUM SERPL-MCNC: 8.9 MG/DL (ref 8.5–10.1)
CHLORIDE SERPL-SCNC: 104 MMOL/L (ref 94–109)
CO2 SERPL-SCNC: 29 MMOL/L (ref 20–32)
CREAT SERPL-MCNC: 0.8 MG/DL (ref 0.52–1.04)
GFR SERPL CREATININE-BSD FRML MDRD: >90 ML/MIN/{1.73_M2}
GLUCOSE SERPL-MCNC: 132 MG/DL (ref 70–99)
POTASSIUM SERPL-SCNC: 4.2 MMOL/L (ref 3.4–5.3)
SODIUM SERPL-SCNC: 138 MMOL/L (ref 133–144)

## 2021-01-28 PROCEDURE — 97530 THERAPEUTIC ACTIVITIES: CPT | Mod: GO | Performed by: OCCUPATIONAL THERAPIST

## 2021-01-28 PROCEDURE — 36415 COLL VENOUS BLD VENIPUNCTURE: CPT | Performed by: PHYSICIAN ASSISTANT

## 2021-01-28 PROCEDURE — 97112 NEUROMUSCULAR REEDUCATION: CPT | Mod: GP | Performed by: PHYSICAL THERAPIST

## 2021-01-28 PROCEDURE — 97110 THERAPEUTIC EXERCISES: CPT | Mod: GP | Performed by: PHYSICAL THERAPIST

## 2021-01-28 PROCEDURE — 80048 BASIC METABOLIC PNL TOTAL CA: CPT | Performed by: PHYSICIAN ASSISTANT

## 2021-01-28 PROCEDURE — 97750 PHYSICAL PERFORMANCE TEST: CPT | Mod: GP | Performed by: PHYSICAL THERAPIST

## 2021-01-28 RX ORDER — POTASSIUM CHLORIDE 1500 MG/1
20 TABLET, EXTENDED RELEASE ORAL 2 TIMES DAILY
Qty: 60 TABLET | Refills: 0 | Status: SHIPPED | OUTPATIENT
Start: 2021-01-28 | End: 2021-02-17

## 2021-01-28 NOTE — PROGRESS NOTES
01/28/21 1000   Signing Clinician's Name / Credentials   Signing clinician's name / credentials Carmen Stoll DPT   Functional Gait Assessment (ELICIA Reynolds, EFREN Aviles, et al. (2004))   1. GAIT LEVEL SURFACE 3   2. CHANGE IN GAIT SPEED 3   3. GAIT WITH HORIZONTAL HEAD TURNS 2   4. GAIT WITH VERTICAL HEAD TURNS 2   5. GAIT AND PIVOT TURN 3   6. STEP OVER OBSTACLE 3   7. GAIT WITH NARROW BASE OF SUPPORT 2  (7 steps)   8. GAIT WITH EYES CLOSED 3   9. AMBULATING BACKWARDS 3   10. STEPS 2   Total Functional Gait Assessment Score   TOTAL SCORE: (MAXIMUM SCORE 30) 26     Functional Gait Assessment (FGA): The FGA assesses postural stability during various walking tasks.   Gait assistive device used: None    Patient Score: 26/30  Scores of <22/30 have been correlated with predicting falls in community-dwelling older adults according to Gail & Jesus Manuel 2010.   Scores of <18/30 have been correlated with increased risk for falls in patients with Parkinsons Disease according to Santi Lucero Zhou et al 2014.  Minimal Detectable Change for patients with acute/chronic stroke = 4.2 according to Thimarce & Ritschel 2009  Minimal Detectable Change for patients with vestibular disorder = 8 according to Gail & Jesus Manuel 2010    Assessment (rationale for performing, application to patient s function & care plan): FGA performed to assess dynamic balance and gait stability, as well as to assess progress toward her goals. Patient demonstrates significant improvement from initial evaluation with a score of 17/30, to 26/30 today, meeting her goal. She is also no longer at an increased risk for falls.   Minutes billed as physical performance test: 10

## 2021-01-28 NOTE — TELEPHONE ENCOUNTER
Pending Prescriptions:                       Disp   Refills    hydrOXYzine (ATARAX) 25 MG tablet         90 tab*1            Sig: Take 1-2 tablets (25-50 mg) by mouth every 6           hours as needed for anxiety or other (adjuvant           pain)    Last refill 01/18/2021  Last office visit 01/21/21  Next appointment None     Sherrell Prabhakar MA

## 2021-01-28 NOTE — LETTER
February 25, 2021      Hilaria Bonner  [address]  [address]  [place] [address]        Dear Hilaria,     We have tried sending you [employer] messages as well as calling you.   I see that you have not yet scheduled a fasting lab only appointment.  We also need to check your potassium as soon as possible.  Your potassium may have been low from your alcohol use and I want to avoid a need for an emergency department visit.  It is very important that we monitor your potassium as well as blood sugar.  Abnormal potassium levels can lead to heart conditions and other problems. Please schedule a fasting lab only appointment at your earliest convenience.   Please call or MyChart my office with any questions or concerns.          Sincerely,        Crissy Madrigal PA-C

## 2021-01-28 NOTE — PROGRESS NOTES
Outpatient Physical Therapy Discharge Note     Patient: Hilaria Bonner  : 1990    Beginning/End Dates of Reporting Period:  2020 to 2021    Referring Provider: Crissy Madrigal PA-C     Therapy Diagnosis: weakness and balance impairments      Client Self Report: Patient reports typical neuropathy pain in her hands and feet. Patient has been keeping up with the TENs 2x per day for 30 minutes at a time, not noticing any improvement. Patient has been working on walking every day able to do about 1 hour total over the day, usually able to do several sets of 5-10 minutes at a time. Patient is in agreement with discharge from PT at this time.     Objective Measurements:  Objective Measure: single leg stance  Details: R 8 sec, L 16 sec  Objective Measure: FGA  Details:     Goals:  Goal Identifier floor<>stand    Goal Description Emily will perform floor<>stand transfer with light support from anterior surface in 2/2 trials with steady balance upon standing in order to more easily get up and down from ground when playing with kids.    Target Date 10/02/20   Date Met  10/29/20   Progress:  Goal met     Goal Identifier FGA- progressing,  on 10/29, extend to 21   Goal Description Emily will score a 25 or > on FGA in order to show improved balance for home and community ambulation to prevent falls.    Target Date 10/02/20   Date Met  21   Progress:  Goal met     Goal Identifier sit<>stand    Goal Description Emily will perform sit<>stand from 18'' surface x 5 rep without UEs in order to show improved LE strength needed for transfers and day to day activites.    Target Date 10/02/20   Date Met  20   Progress:  Goal met     Goal Identifier New goal-- walking program, exend to 21   Goal Description Emily will ambulate for 30 minutes 5 times per week to improve her functional endurance and allow her to better keep up with her kids and movement in the community.     Target Date 12/27/20   Date Met  01/28/21   Progress: Goal met     Goal Identifier New Goal-- SLS, extend to 1/27/21   Goal Description Emily will be able to stand for 10 secs on each LE in 2/3 trials in order to show improved strength and balance needed for daily activites.    Target Date 12/27/20   Date Met      Progress: Patient demonstrates progress toward her goal with ability to hold for 16 on her left and 8 seconds on her right at best, improved from last session of 4-5 seconds on each side. Goal partially met.        Progress Toward Goals:   Progress this reporting period: Patient has met most of her goals demonstrates improvements in balance, strength, endurance, and safety in transfers to and from the floor. Patient continues to have significant neuropathy changes and pain, however she has met most goals and is in agreement with discharge to [facility] at this time.     Plan:  Discharge from therapy.    Discharge:    Reason for Discharge: Patient has met all goals.  No further expectation of progress.    Equipment Issued: None    Discharge Plan: Patient to continue home program.

## 2021-01-28 NOTE — TELEPHONE ENCOUNTER
Potassium was normal on kcl 20meq twice a day .  Blood sugar in diabetes range- will have her repeat fasting glucose and A1C.

## 2021-01-28 NOTE — RESULT ENCOUNTER NOTE
Please call and advise and find out if was a fasting specimen- also sent results through [employer]     Aleksey Manrique  Your electrolytes were normal. Continue the potassium as you have been taking.   Your blood sugar was high- let us know if this was a fasting specimen or not.   Your kidney function was normal.  Please call or MyChart my office with any questions or concerns.    Crissy Madrigal, PAC

## 2021-01-28 NOTE — PROGRESS NOTES
Outpatient Occupational Therapy Discharge Note     Patient: Hilaria Bonner  : 1990    Beginning/End Dates of Reporting Period:  2020 to 2021    Referring Provider: Crissy Madrigal PA-C    Therapy Diagnosis: decreased I/ADL performance    Client Self Report: Pt continues to have painful neuropathy in hands and feet that limitis I/ADl performance.  Pt stated she is looking into PCA services with the assist of her .        Goals:     Goal Identifier 1   Goal Description Pt will reduce 9-hole peg test score with bilateral hands by at least 10 seconds to improve FMC for I/ADLs.   Target Date 20   Date Met  10/29/20   Progress:     Goal Identifier 2   Goal Description Pt will be able to tolerate using a wash cloth in the shower due to desensitization of skin on hands and feet.    Target Date 21   Date Met  (not met)   Progress:     Goal Identifier 3   Goal Description Pt will report adherence to, daily, BUE HEP (strength, endurance and FMC) to address strength and endurance to improve function needed for I/ADLs.    Target Date 21   Date Met  21   Progress:     Goal Identifier 4   Goal Description Pt will report making meal involving at least 3 steps (ie chopping vegetables, cooking rice, mixing ingrediiants together) due to improved strength, endurance, FMC and less pain in hands.    Target Date 20   Date Met  20   Progress:     Goal Identifier 5   Goal Description Pt will demonstrate 100% accuracy in 2 executive functioning tasks including problem solving, planning and organizing for successful return to work.     Target Date 21   Date Met  21   Progress:     Goal Identifier     Goal Description     Target Date     Date Met      Progress:     Goal Identifier     Goal Description     Target Date     Date Met      Progress:     Goal Identifier     Goal Description     Target Date     Date Met      Progress:     Progress Toward Goals:   Pt  "seen for 15 visits.  Pt did meet goals 1,3,4,5 but is still limited by neuropathy of bilateral hands.  Painful neuropathy and muscle spasms of hands and feet limit her ability to full engage in I/ADLs every day of the week as she has \"good\" days and \"bad\" days.      Plan:  Discharge from therapy.    Discharge:    Reason for Discharge: Patient has met goals 1,3,4,5.     Equipment Issued: surgical brush    Discharge Plan: Patient to continue home program:  desensitization tasks with surgical brush, continue FMC tasks, meal prep  "

## 2021-01-28 NOTE — PROGRESS NOTES
Rehabilitation Services      OUTPATIENT OCCUPATIONAL THERAPY  PLAN OF TREATMENT FOR OUTPATIENT REHABILITATION    Patient's Last Name, First Name, M.I.                YOB: 1990  Hilaria Bonner                        Provider's Name  Chen Oleary, OT Medical Record No.  [id number]                               Onset Date: 4/16/2020   Start of Care Date: 7/28/2020   Type:     ___PT   _X_OT   ___SLP Medical Diagnosis: H/o massive PE and cardiac arrest in 2019, ARAMIS, obesity, COVI-19 in April of 2020.                       OT Diagnosis: decreased I/ADL performance.       _________________________________________________________________________________  Plan of Treatment:    Frequency/Duration: one more visit     Goals:    Goal Identifier 1   Goal Description Pt will reduce 9-hole peg test score with bilateral hands by at least 10 seconds to improve FMC for I/ADLs.   Target Date 11/26/20   Date Met  10/29/20   Progress:     Goal Identifier 2   Goal Description Pt will be able to tolerate using a wash cloth in the shower due to desensitization of skin on hands and feet.    Target Date 01/25/21   Date Met      Progress:     Goal Identifier 3   Goal Description Pt will report adherence to, daily, BUE HEP (strength, endurance and FMC) to address strength and endurance to improve function needed for I/ADLs.    Target Date 01/25/21   Date Met      Progress:     Goal Identifier 4   Goal Description Pt will report making meal involving at least 3 steps (ie chopping vegetables, cooking rice, mixing ingrediiants together) due to improved strength, endurance, FMC and less pain in hands.    Target Date 11/26/20   Date Met  11/30/20   Progress:     Goal Identifier 5   Goal Description Pt will demonstrate 100% accuracy in 2 executive functioning tasks including problem solving, planning and organizing for successful return to work.  "    Target Date 01/25/21   Date Met      Progress:       Progress Toward Goals:   Progress limited due to continued neuropathy of bilateral hands and feet and muscle spasms.  This causes pain and inability to use hands when muscle spasms are present.  Therefore, FMC, pinch/ strength and I/ADL performance are negatively affected. Pt has \"good\" and \"bad\" days.      Certification date from 1/26/2021  to 2/2/2021    Chen Oleary OT          I CERTIFY THE NEED FOR THESE SERVICES FURNISHED UNDER        THIS PLAN OF TREATMENT AND WHILE UNDER MY CARE     (Physician co-signature of this document indicates review and certification of the therapy plan).                Referring Provider: Crsisy Madrigal PA-C  "

## 2021-01-28 NOTE — TELEPHONE ENCOUNTER
Phone call to patient - we have her on quite a bit of potassium and has not yet scheduled lab only appointment to recheck potassium - this is critical. I see she has therapy appointments tomorrow and recommend lab only appointment to recheck potassium (basic metabolic panel) order is in the chart     No answer twice- left message to call back and speak with one of the RNs.  - also sent [employer] message

## 2021-01-29 RX ORDER — HYDROXYZINE HYDROCHLORIDE 25 MG/1
25-50 TABLET, FILM COATED ORAL EVERY 6 HOURS PRN
Qty: 32 TABLET | Refills: 0 | Status: SHIPPED | OUTPATIENT
Start: 2021-01-29 | End: 2021-02-02

## 2021-01-29 NOTE — TELEPHONE ENCOUNTER
Called [facility].  Spoke with Pharmacist.  They state that Janki Goodman has been the only provider prescribing this for [facility].  Verified last  date as 01/20/2021.  Technically this is an 11 day supply if patient is taking 1-2 tablets every 6 hours.  Called Hilaria.  Lmom asking for a return call.      Stephenie Gregorio RN  Care Coordinator  [facility] Pain [facility]

## 2021-02-01 ENCOUNTER — APPOINTMENT (OUTPATIENT)
Dept: LAB | Facility: CLINIC | Age: 31
End: 2021-02-01
Attending: PSYCHIATRY & NEUROLOGY
Payer: COMMERCIAL

## 2021-02-02 ENCOUNTER — HOME INFUSION (PRE-WILLOW HOME INFUSION) (OUTPATIENT)
Dept: PHARMACY | Facility: CLINIC | Age: 31
End: 2021-02-02

## 2021-02-02 ENCOUNTER — MYC MEDICAL ADVICE (OUTPATIENT)
Dept: GENERAL RADIOLOGY | Facility: CLINIC | Age: 31
End: 2021-02-02

## 2021-02-02 ENCOUNTER — PATIENT OUTREACH (OUTPATIENT)
Dept: NURSING | Facility: CLINIC | Age: 31
End: 2021-02-02
Payer: COMMERCIAL

## 2021-02-02 DIAGNOSIS — R20.2 PARESTHESIAS: ICD-10-CM

## 2021-02-02 DIAGNOSIS — N76.0 BACTERIAL VAGINOSIS: ICD-10-CM

## 2021-02-02 DIAGNOSIS — B96.89 BACTERIAL VAGINOSIS: ICD-10-CM

## 2021-02-02 RX ORDER — METRONIDAZOLE 500 MG/1
500 TABLET ORAL 2 TIMES DAILY
Qty: 14 TABLET | Refills: 0 | Status: CANCELLED | OUTPATIENT
Start: 2021-02-02

## 2021-02-02 RX ORDER — GLYCERIN ADULT
SUPPOSITORY, RECTAL RECTAL
COMMUNITY
Start: 2021-01-19 | End: 2022-02-02

## 2021-02-02 RX ORDER — HYDROXYZINE HYDROCHLORIDE 25 MG/1
25-50 TABLET, FILM COATED ORAL EVERY 6 HOURS PRN
Qty: 32 TABLET | Refills: 0 | Status: CANCELLED | OUTPATIENT
Start: 2021-02-02

## 2021-02-02 NOTE — TELEPHONE ENCOUNTER
Routing refill request to provider for review/approval because:  Drug not on the [facility] refill protocol   Drug not active on patient's medication list      Ayana Tay RN, BSN, PHN

## 2021-02-02 NOTE — TELEPHONE ENCOUNTER
Zia response received from pt.        To: [facility]      From: Hilaria Bonner      Created: 2/2/2021 10:45 AM        *-*-*This message has not been handled.*-*-*    I press the wrong script to refill please disregard the request

## 2021-02-02 NOTE — TELEPHONE ENCOUNTER
Pending Prescriptions:                       Disp   Refills    hydrOXYzine (ATARAX) 25 MG tablet         32 tab*0            Sig: Take 1-2 tablets (25-50 mg) by mouth every 6           hours as needed for anxiety or other (adjuvant           pain)   last refill 01/29/2021  Last office visit 01/21/21  Next appointment None     Sherrell Prabhakar MA

## 2021-02-02 NOTE — LETTER
[facility]  Complex Care Plan  About Me:    Patient Name:  Hilaria Bonner    YOB: 1990  Age:         30 year old   [place] MRN:    [id number] Telephone Information:  Home Phone [phone]   Mobile [phone]       Address:  [address]  [place] [address] Email address:  [email or web address]      Emergency Contact(s)    Name Relationship Lgl Grd Work Phone Home Phone Mobile Phone   1. MANJULA BONNER Mother   [phone]           Primary language:  English     needed? No   [facility] [employer]:  [phone] op. 1  Other communication barriers: None  Preferred Method of Communication:  Ferdinand  Current living arrangement:  Family  Mobility Status/ Medical Equipment:  Walker    Health Maintenance  Health Maintenance Reviewed:      My Access Plan  Medical Emergency 911   Primary Clinic Line [facility] - [phone]   24 Hour Appointment Line [phone] or  [phone]) (toll-free)   24 Hour Nurse Line [phone] (toll-free)   Preferred Urgent Care     Preferred Hospital     Preferred Pharmacy [facility][id number] - [place], [place] - [address] AT [facility] OF [facility]     Behavioral Health Crisis Line The National Suicide Prevention Lifeline at 1-681.574.2351 or 911             My Care Team Members  Patient Care Team       Relationship Specialty Notifications Start End    Crissy Madrigal PA-C PCP - General Family Practice  10/10/19     Phone: [phone] Fax: [phone] [address] [place] [address] [address] [facility] [address]    Joanne Sterling PA-C Physician Assistant Physician Assistant  1/16/18     Phone: [phone] Fax: [phone]         [address] [facility] [address] [facility] [address]    Alison Sevilla PA-C Physician Assistant Physician Assistant  1/16/18     Phone: [phone] Fax: [phone] [address]  [facility] [address]    Monica Hernandez, TEN Lead Care Coordinator Primary Care - CC Admissions 6/12/20     Phone: [phone]         Momo Sher MD MD Neurology  7/27/20     Phone: [phone] Fax: [phone]         [address] [place] [address]    Travon Chua MD MD Neurology  7/27/20     Phone: [phone] Fax: [phone]         [address] [place] [address]    Yash Melgar DO Assigned Musculoskeletal Provider   10/23/20     Phone: [phone] Fax: [phone]         [address] [place] [address]    Travon Chua MD Assigned Neuroscience Provider   10/23/20     Phone: [phone] Fax: [phone]         [address] [place] [address]    Evgeny Cheatham MD Assigned OBGYN Provider   10/23/20     Phone: [phone] Fax: [phone] [address] [facility] [address]    Yelena Jones MD Assigned Heart and Vascular Provider   11/15/20     Phone: [phone] Pager: [phone] Fax: [phone]         [facility] [address]    Joseph Kinney MD Assigned Endocrinology Provider   11/29/20     Phone: [phone] Pager: [phone] Fax: [phone]        [address] [facility] [address]    Crissy Madrigal, PA-C Assigned PCP   1/10/21     Phone: [phone] Fax: [phone] [address] [facility] [address] [facility] [address]            My Care Plans  Self Management and Treatment Plan  Goals and (Comments)  Goals        General    Improve chronic symptoms (pt-stated)     Notes - Note edited  2/2/2021  2:22 PM by Monica Hernandez RN    Goal Statement: I want the burning pain in my hands to improve    Date Goal set: 6/12/20    Barriers: Polyneuropathy    Strengths: Family support    Date to Achieve By: June 2021  Patient expressed understanding of goal: yes    Action steps to achieve this goal:  1. I will continue Lyrica TID  2. I will use Tramadol as instructed for pain  3. I will participate in  outpatient therapies (completed therapies 2/2021)             Action Plans on File:                       Advance Care Plans/Directives Type:        My Medical and Care Information  Problem List   Patient Active Problem List   Diagnosis     Vitamin D deficiency     Elevated parathyroid hormone     Encounter for smoking cessation counseling     Menorrhagia with irregular cycle     History of morbid obesity     Pulmonary embolism with infarction (H)     Cardiac arrest, cause unspecified (H)     VT (ventricular tachycardia) (H)     Other pulmonary embolism with acute cor pulmonale, unspecified chronicity (H)     Secondary hyperparathyroidism, not elsewhere classified (H)     Paresthesias     Hemorrhoids, unspecified hemorrhoid type     Anxiety     Polyneuropathy     Altered mental status     CIDP (chronic inflammatory demyelinating polyneuropathy) (H)     S/P laparoscopic sleeve gastrectomy     Class 2 severe obesity due to excess calories with serious comorbidity and body mass index (BMI) of 39.0 to 39.9 in adult (H)      Current Medications and Allergies:  See printed Medication Report.    Care Coordination Start Date: 6/12/2020   Frequency of Care Coordination: monthly   Form Last Updated: 02/02/2021

## 2021-02-02 NOTE — TELEPHONE ENCOUNTER
This writer attempted to contact pt on 02/02/21      Reason for call schedule OV for refill and left message.    [employer] message sent to pt.       If patient calls back:   Schedule Office Visit appointment within 2 weeks with PCP, document that pt called and close encounter         Noemi Wright

## 2021-02-02 NOTE — TELEPHONE ENCOUNTER
Requesting med not on med list:    metroNIDAZOLE (FLAGYL) 500 MG tablet 14 tablet 0 9/30/2020 10/7/2020

## 2021-02-02 NOTE — PROGRESS NOTES
"Clinic Care Coordination Contact    Follow Up Progress Note      Assessment: Outreach to patient.     Patient states she has completed PT and OT. They feel she is doing a good job with her home exercise. She states that balance is still a problem as she can't always feel her feet or they are painful to step on.  She is practicing heel to toe.    She states OT told her that once she gets the feeling back in her hands they would like her to come back and they can work on strengthening.  She drops things all the time.     She states that Dr. Chua has backed the infusion treatments off, to monthly now (IVIg)  She states she missed her appointment last Friday and has had more pain this week. She is scheduled for this Friday.     She states she is now having a lot of problems with muscle spasms. She was started on Atarax by pain management. She states she was told to increase it if it was not enough. She is going to increase it this week, she states she wanted to try it on the lowest dose for a while first.      Patient states she is using medical cannabis as she can afford it. She does not feel it does much for her neuropathy pain but it does help her sleep at night.   Patient states she just tries to continue to push through the pain in her hands and feet. She states I have two kids to raise, one is 11 and one is 4.  I want to have my \"old life back, working, raising my kids.\" I don't want to feel sorry for myself.    Patient states she has been approved for PCA, 2 hours per day. She needs to call an agency and find one. She states she has some cousins that work as PCA's so she is going to check and see what agencies they work for.  Informed her that RN care coordinator could give her some names and numbers of agencies as well if she needs them.      Goals addressed this encounter: Minimal improvement  Goals Addressed                 This Visit's Progress      Improve chronic symptoms (pt-stated)   20%     Goal " Statement: I want the burning pain in my hands to improve    Date Goal set: 6/12/20    Barriers: Polyneuropathy    Strengths: Family support    Date to Achieve By: June 2021  Patient expressed understanding of goal: yes    Action steps to achieve this goal:  1. I will continue Lyrica TID  2. I will use Tramadol as instructed for pain  3. I will participate in outpatient therapies (completed therapies 2/2021)        Intervention/Education provided during outreach: As above     Outreach Frequency: monthly    Plan:   Patient will find a PCA.    Care Coordinator will follow up in one month.     Monica Hernandez RN, [facility] - Primary Care Clinic RN Coordinator  [facility]   2/2/2021    2:33 PM  [phone]

## 2021-02-03 DIAGNOSIS — G62.9 POLYNEUROPATHY: ICD-10-CM

## 2021-02-03 RX ORDER — PREGABALIN 200 MG/1
200 CAPSULE ORAL 3 TIMES DAILY
Qty: 90 CAPSULE | Refills: 3 | Status: SHIPPED | OUTPATIENT
Start: 2021-02-03 | End: 2021-08-13

## 2021-02-03 NOTE — PROGRESS NOTES
This is a recent snapshot of the patient's [facility] medical record.  For current drug dose and complete information and questions, call [phone] or In Basket pool, fv home infusion ([id number])  CSN Number:  [id number]

## 2021-02-03 NOTE — TELEPHONE ENCOUNTER
Signed Prescriptions:                        Disp   Refills    Pregabalin (LYRICA) 200 MG capsule         90 cap*3        Sig: Take 1 capsule (200 mg) by mouth 3 times daily  Authorizing Provider: JOSE GIORDANO MD  [facility]

## 2021-02-03 NOTE — TELEPHONE ENCOUNTER
Received fax from pharmacy requesting refill(s) for Pregabalin (LYRICA) 200 MG capsule     Last refilled on 1/18/2021    Pt last seen on 1/21/2021  Next appt scheduled for NONE    E-prescribe to:    [facility] #[id number] - [place], [place] - [address] AT [facility]     Will facilitate refill.

## 2021-02-03 NOTE — TELEPHONE ENCOUNTER
Called Hilaria.  Needed to verify how she is taking the Hydroxyzine.  She states it just depends on the day, but usually she take 2 tablets on average a day.  She stated that Janki Goodman prescribed this for her because she was trying to re establish with a psychiatrist.  Pt now has an appointment with a psychiatrist on March 22nd.  She will call us when she has about 10 tablets left for a refill. She will need enough medication to get her till that appointment. When she sees that provider, she will ask for them to take over prescribing the Hydroxyzine.  She is on a wait list for a cancellation. She just picked up the Hydroxyzine script yesterday.     Pt also states she needs a refill on her Lyrica.    Called [facility] in [place].  Last  date is 01/20/2021.  This was the last refill. She will not be due till 02/19/2021. Can wait on this refill till next month.    Stephenie Gregorio RN  Care Coordinator  [facility]

## 2021-02-05 ENCOUNTER — HOME INFUSION (PRE-WILLOW HOME INFUSION) (OUTPATIENT)
Dept: PHARMACY | Facility: CLINIC | Age: 31
End: 2021-02-05

## 2021-02-06 ENCOUNTER — TRANSFERRED RECORDS (OUTPATIENT)
Dept: HEALTH INFORMATION MANAGEMENT | Facility: CLINIC | Age: 31
End: 2021-02-06

## 2021-02-06 LAB — TRIGL SERPL-MCNC: 263 MG/DL

## 2021-02-07 LAB
ALT SERPL-CCNC: 18 IU/L (ref 8–45)
AST SERPL-CCNC: 27 IU/L (ref 2–40)
CREAT SERPL-MCNC: 0.83 MG/DL (ref 0.57–1.11)
GFR SERPL CREATININE-BSD FRML MDRD: >60 ML/MIN/1.73M2
GLUCOSE SERPL-MCNC: 124 MG/DL (ref 65–100)
POTASSIUM SERPL-SCNC: 4.6 MMOL/L (ref 3.5–5)

## 2021-02-08 NOTE — PROGRESS NOTES
This is a recent snapshot of the patient's [facility] medical record.  For current drug dose and complete information and questions, call [phone] or In Basket pool, fv home infusion ([id number])  CSN Number:  [id number]

## 2021-02-10 ENCOUNTER — VIRTUAL VISIT (OUTPATIENT)
Dept: FAMILY MEDICINE | Facility: CLINIC | Age: 31
End: 2021-02-10
Payer: COMMERCIAL

## 2021-02-10 ENCOUNTER — PATIENT OUTREACH (OUTPATIENT)
Dept: CARE COORDINATION | Facility: CLINIC | Age: 31
End: 2021-02-10

## 2021-02-10 DIAGNOSIS — K85.20 ALCOHOL-INDUCED ACUTE PANCREATITIS WITHOUT INFECTION OR NECROSIS: Primary | ICD-10-CM

## 2021-02-10 DIAGNOSIS — M54.6 ACUTE MIDLINE THORACIC BACK PAIN: ICD-10-CM

## 2021-02-10 DIAGNOSIS — I26.09 OTHER PULMONARY EMBOLISM WITH ACUTE COR PULMONALE, UNSPECIFIED CHRONICITY (H): ICD-10-CM

## 2021-02-10 DIAGNOSIS — I26.99 PULMONARY EMBOLISM WITH INFARCTION (H): ICD-10-CM

## 2021-02-10 DIAGNOSIS — F32.1 MODERATE MAJOR DEPRESSION (H): ICD-10-CM

## 2021-02-10 DIAGNOSIS — I46.9 CARDIAC ARREST (H): ICD-10-CM

## 2021-02-10 DIAGNOSIS — F10.10 ALCOHOL ABUSE: ICD-10-CM

## 2021-02-10 DIAGNOSIS — I47.20 VT (VENTRICULAR TACHYCARDIA) (H): ICD-10-CM

## 2021-02-10 DIAGNOSIS — K85.20 ALCOHOL-INDUCED ACUTE PANCREATITIS: Primary | ICD-10-CM

## 2021-02-10 PROCEDURE — 99495 TRANSJ CARE MGMT MOD F2F 14D: CPT | Mod: 95 | Performed by: PHYSICIAN ASSISTANT

## 2021-02-10 ASSESSMENT — ACTIVITIES OF DAILY LIVING (ADL): DEPENDENT_IADLS:: INDEPENDENT

## 2021-02-10 NOTE — PROGRESS NOTES
Hilaria is a 30 year old who is being evaluated via a billable telephone visit.      What phone number would you like to be contacted at? [phone]  How would you like to obtain your AVS? Ferdinand    Assessment & Plan     Alcohol-induced acute pancreatitis without infection or necrosis  Recent hospitalization -improving- just discharged from hospital yesterday.  Has limited supply of oxycodone     Alcohol abuse  Referred for treatment and follow up with addiction medicine- advised I am not comfortable prescribing naltrexone- she states she has prescription to start once oxycodone is completed.   - MENTAL HEALTH REFERRAL  - Adult; Addiction Medicine Provider, Outpatient Treatment; Chemical Dependency Treatment; [facility]: Intensive Outpatient Program [phone]; Patient call to schedule; Addiction Medicine Evaluation & Treatment; Addiction Medi...    Acute midline thoracic back pain  Referred to physical therapy -follow up with us urgently if any change in symptoms   - [facility] PT, HAND, AND CHIROPRACTIC REFERRAL    Pulmonary embolism with infarction (H)  On xarelto indefinitely- had cardiac arrest 5/19    Other pulmonary embolism with acute cor pulmonale, unspecified chronicity (H)  History of PE with cardiac arrest- xarelto indefinitely     Moderate major depression (H)  On cymbalta- symptoms not at goal but no thoughts of harming self     Cardiac arrest (H)  Due to massive PE 5/13/19 - has had - is followed by cardiology     VT (ventricular tachycardia) (H)  Most recent ziopatch with small run of NSVT - is followed by cardiology           Review of external notes as documented elsewhere in note         Patient Instructions   Follow up with addiction specialists and outpatient treatment   Follow up with physical therapy for back pain  Go to emergency department if back pain increases, increasing abdominal pain, fever, vomiting or other change in symptoms.   Follow up with me in clinic in approximately one month.        No follow-ups on file.    Crissy Madrigal PA-C  [facility] CHAUNCEY Manrique is a 30 year old who presents for the following health issues     HPI         Hospital Follow-up Visit:    Hospital/Nursing Home/IP Rehab Facility: [facility]   Date of Admission: 02/06/21  Date of Discharge: 02/10/21  Reason(s) for Admission: Alcohol-induced pancreatitis      Was your hospitalization related to COVID-19? No   Problems taking medications regularly:  None  Medication changes since discharge: None  Problems adhering to non-medication therapy:  None    Summary of hospitalization:  CareEverywhere information obtained and reviewed  Diagnostic Tests/Treatments reviewed.  Follow up needed: alcohol treatment   Other Healthcare Providers Involved in Patient s Care:         None  Update since discharge: improved. Post Discharge Medication Reconciliation: discharge medications reconciled and changed, per note/orders.  Plan of care communicated with patient              Patient well known to me -recently hospitalized for acute pancreatitis related to alcohol   Reports 100% my fault - reports that she has been struggling for awhile.  Reports was given some resources during hospital stay  Did assessment with someone over the phone.  had some plans for Group meetings through zoom.    Drinking for 2 months straight.  Drinking 1 pint of vodka a day.  Discharged from hospital yesterday.  Feeling well.  No cravings but still have pain in stomach and pulled something in back.  Neuropathy is the same (polyneuropathy post covid)  Doesn't take tramadol anymore  Sent home with Oxycodone 5 mg -one every 4 hours -reports given  10 tablets - given prescription for naltrexone to start once finished with oxycodone  Back has been killing me.  No physical therapy for back.    Middle mid back pain- radiates up and down.  No pain in arms or legs.    Numbness and tingling and burning with needles  sensation (polyneuropathy related to postcovid- followed by neurology.   Was Discharged from physical therapy for polyneuropathy    Review of Systems   Constitutional, HEENT, cardiovascular, pulmonary, gi and gu systems are negative, except as otherwise noted.      Objective           Vitals:  No vitals were obtained today due to virtual visit.    Physical Exam   healthy, alert and no distress  PSYCH: Alert and oriented times 3; coherent speech, normal   rate and volume, able to articulate logical thoughts, able   to abstract reason, no tangential thoughts, no hallucinations   or delusions  Her affect is normal and pleasant  RESP: No cough, no audible wheezing, able to talk in full sentences  Remainder of exam unable to be completed due to telephone visits                Phone call duration: 10 minutes

## 2021-02-10 NOTE — PROGRESS NOTES
Clinic Care Coordination Contact    Follow Up Progress Note      Assessment: Outreach to patient. Left message on voicemail and requested return call.    Patient was inpatient at   [facility] from 2/6/21 to 2/9/21 with alcohol induced acute pancreatitis. Patient admits to drinking a liter of vodka per day.     Patient was interested in treatment and did meet with [facility] while inpatient.   She was provided with resources at time of discharge.    It was advised that patient start naltrexone 50 mg when she is off of all narcotics.      Goals addressed this encounter:   Goals Addressed                 This Visit's Progress      Improve chronic symptoms (pt-stated)   20%     Goal Statement: I want the burning pain in my hands to improve    Date Goal set: 6/12/20    Barriers: Polyneuropathy    Strengths: Family support    Date to Achieve By: June 2021  Patient expressed understanding of goal: yes    Action steps to achieve this goal:  1. I will continue Lyrica TID  2. I will use Tramadol as instructed for pain  3. I will participate in outpatient therapies (completed therapies 2/2021)          Intervention/Education provided during outreach: Requested return call     Outreach Frequency: monthly    Plan:   Patient will attend virtual visit with PCP today   Care Coordinator will follow up in one month  Monica Hernandez RN, [facility] - Primary Care Clinic RN Coordinator  [facility]   2/10/2021    11:05 AM  [phone]

## 2021-02-11 PROBLEM — F10.10 ALCOHOL ABUSE: Status: ACTIVE | Noted: 2021-02-11

## 2021-02-11 PROBLEM — K85.20 ALCOHOL-INDUCED ACUTE PANCREATITIS WITHOUT INFECTION OR NECROSIS: Status: ACTIVE | Noted: 2021-02-11

## 2021-02-11 PROBLEM — F32.1 MODERATE MAJOR DEPRESSION (H): Status: ACTIVE | Noted: 2021-02-11

## 2021-02-11 RX ORDER — NALTREXONE HYDROCHLORIDE 50 MG/1
50 TABLET, FILM COATED ORAL DAILY
COMMUNITY
Start: 2021-02-11 | End: 2021-03-22

## 2021-02-11 RX ORDER — OXYCODONE HYDROCHLORIDE 5 MG/1
5 TABLET ORAL EVERY 6 HOURS PRN
Qty: 6 TABLET | Refills: 0 | COMMUNITY
Start: 2021-02-11 | End: 2021-02-16

## 2021-02-11 NOTE — PATIENT INSTRUCTIONS
Follow up with addiction specialists and outpatient treatment   Follow up with physical therapy for back pain  Go to emergency department if back pain increases, increasing abdominal pain, fever, vomiting or other change in symptoms.   Follow up with me in clinic in approximately one month.

## 2021-02-15 ENCOUNTER — VIRTUAL VISIT (OUTPATIENT)
Dept: PALLIATIVE MEDICINE | Facility: CLINIC | Age: 31
End: 2021-02-15
Payer: COMMERCIAL

## 2021-02-15 DIAGNOSIS — R20.2 PARESTHESIAS: ICD-10-CM

## 2021-02-15 DIAGNOSIS — G61.81 CHRONIC INFLAMMATORY DEMYELINATING POLYNEUROPATHY (H): Primary | ICD-10-CM

## 2021-02-15 DIAGNOSIS — M62.838 MUSCLE SPASM: ICD-10-CM

## 2021-02-15 PROCEDURE — 96158 HLTH BHV IVNTJ INDIV 1ST 30: CPT | Mod: 95 | Performed by: PSYCHOLOGIST

## 2021-02-15 PROCEDURE — 96159 HLTH BHV IVNTJ INDIV EA ADDL: CPT | Mod: 95 | Performed by: PSYCHOLOGIST

## 2021-02-15 NOTE — PROGRESS NOTES
"Hilaria Bonner is a 30 year old female who is being evaluated via a billable telephone visit.      The patient has been notified of following:     \"This telephone visit will be conducted via a call between you and Jasmin Prince PsyD LP. We have found that certain health care needs can be provided without the need for an in-person session.  This service lets us provide the care you need with a phone conversation.       If during the course of the call I feel a telephone visit is not appropriate, you will not be charged for this service.\"      Reviewed that patient is in a quiet private place, no recording without permission, all apps and notifications of any devices are turned off. Began the session by talking about the risks and benefits of telehealth, what to do if there is a break in the connection, reviewed the safety protocol for during and after sessions. The purpose of using telehealth for this session was due to the COVID-19 pandemic and was to reduce the spread of the disease and protect both the clinician and client.             Patient has given verbal consent for telephone visit? YES    Phone call contact time  Call Started at 11:01 AM  Call Ended at 11:54 AM  Total 53 minutes     Pain Diagnoses per pain provider:   Chronic inflammatory demyelinating polyneuropathy (H)     Paresthesias     Muscle spasm        DATA: During today's visit you reported the following: Your pain is somewhat worse 'intensifying' - attributes this to cold weather, could also be related to withdrawal from alcohol. Your mood is moderately worse - feeling somewhat overwhelmed by everything. Your activity level is still quite high - struggling with pacing. Your stress level is quite high. Your sleep is greatly worse - using medical cannabis to aid sleep. You reported engaging in self-care for your pain 1-3 times daily.    You identified that you would like to focus on the following or had questions regarding the following issues " or concerns, and we discussed the following:   - discussed recent hospitalization for alcohol induced pancreatitis  - current sobriety since Feb 5th - have given aunt credit card so you are not tempted to purchase alcohol  - awaiting referral to outpatient - Rule 25 has not been completed   - encouraged to schedule Rule 25 assessment using list provided at hospital  - currently taking Naltrexone for cravings  - schedule follow up with Sheree Chester MD - there isn't an appointment for 3/18 or any follow up scheduled with our clinic   - still struggling with pacing  - appointment with [facility]   - if you are unable to make our next appointment please call our scheduling line at [phone] so that you do not risk another no show appointment (3 no shows could result in termination from program)    ASSESSMENT: Both pain and mood are worse - this is likely due to withdrawal from alcohol (sober since 2/5) and cold weather. Chemical health remains a priority; if you start a program between now and our next appointment, focus should be on chemical health issues. We may resume services upon completion of treatment for chemical health issues if it does not work to maintain pain psychology sessions.    PLAN:   Your next appointment is scheduled for 3/2 at 10:00 AM.  Assignment/Objectives /interventions for next session:   - schedule Rule 25 assessment - this should be a priority (see list below)  - find sober support: https://[place].org/  - continue to focus on pacing  - complete body scan every 5 minutes during an activity    List of Rule 25 assessment sites:  [facility] [phone] or from list below:  1) [facility] (Phone [phone], Fax:  [phone]) or  2) [facility] (Phone [phone], Fax:  [phone]) or  3) [facility] (Phone [phone], Fax [phone]) or  4) [place] [facility]  (Phone [phone] Fax [phone])  5) [facility] (Phone:   [phone], Fax:  [phone])   6) [facility] (Phone: [phone], Fax: [phone])  7) [facility] (Phone:  [phone], Fax:  [phone])   8) [facility] (phone [phone]; fax [phone])  9) [facility] (Phone:  [phone], Fax:  [phone])   10) [facility] (Phone:  [phone], Fax:  [phone])  11) [facility] (Phone:  [phone], Fax:  [phone])  12)  [facility] (Phone:  [phone], Fax:  [phone]) - Contact =Carrie - [phone])   Or [facility] (phone [phone]; fax [phone])  13) [facility] (Phone:  [phone], Fax:  [phone])      Telephone-Visit Details    Type of service:  Telephone Visit    Originating Location (pt. Location): Home    Distant Location (provider location):  [facility]     Mode of Communication:  Telephone    I have reviewed the note as documented above.  This accurately captures the substance of my conversation with the patient.    Jasmin Prince PsyD LP  Licensed Psychologist  Outpatient Clinic Therapist  [facility]    Disclaimer: This note consists of symbols derived from keyboarding, dictation and/or voice recognition software. As a result, there may be errors in the script that have gone undetected. Please consider this when interpreting information found in this chart.

## 2021-02-16 ENCOUNTER — HOME INFUSION (PRE-WILLOW HOME INFUSION) (OUTPATIENT)
Dept: PHARMACY | Facility: CLINIC | Age: 31
End: 2021-02-16

## 2021-02-16 ENCOUNTER — VIRTUAL VISIT (OUTPATIENT)
Dept: FAMILY MEDICINE | Facility: CLINIC | Age: 31
End: 2021-02-16
Payer: COMMERCIAL

## 2021-02-16 DIAGNOSIS — K85.20 ALCOHOL-INDUCED ACUTE PANCREATITIS, UNSPECIFIED COMPLICATION STATUS: ICD-10-CM

## 2021-02-16 DIAGNOSIS — Z92.89 HISTORY OF RECENT HOSPITALIZATION: ICD-10-CM

## 2021-02-16 DIAGNOSIS — G61.81 CHRONIC INFLAMMATORY DEMYELINATING POLYNEUROPATHY (H): ICD-10-CM

## 2021-02-16 DIAGNOSIS — F10.10 ALCOHOL ABUSE: ICD-10-CM

## 2021-02-16 DIAGNOSIS — R20.2 PARESTHESIAS: ICD-10-CM

## 2021-02-16 DIAGNOSIS — G47.00 INSOMNIA, UNSPECIFIED TYPE: Primary | ICD-10-CM

## 2021-02-16 PROCEDURE — 99214 OFFICE O/P EST MOD 30 MIN: CPT | Mod: 95 | Performed by: NURSE PRACTITIONER

## 2021-02-16 RX ORDER — QUETIAPINE FUMARATE 50 MG/1
TABLET, FILM COATED ORAL
Qty: 30 TABLET | Refills: 0 | Status: SHIPPED | OUTPATIENT
Start: 2021-02-16 | End: 2021-03-22

## 2021-02-16 RX ORDER — NALTREXONE HYDROCHLORIDE 50 MG/1
50 TABLET, FILM COATED ORAL DAILY
COMMUNITY
Start: 2021-02-16 | End: 2021-03-22

## 2021-02-16 NOTE — TELEPHONE ENCOUNTER
Received fax from pharmacy requesting refill(s) for DULoxetine (CYMBALTA) 60 MG capsule     Last refilled on 1/18/2021    Pt last seen on 1/21/2021  Next appt scheduled for 3/3/2021    E-prescribe to:    [facility] #[id number] - [place], [place] - [address] AT [facility]     Will facilitate refill.

## 2021-02-16 NOTE — PROGRESS NOTES
Hilaria is a 30 year old who is being evaluated via a billable telephone visit.      What phone number would you like to be contacted at? cell  How would you like to obtain your AVS? [facility]    Assessment & Plan     Insomnia, unspecified type  Was on this in the past, will trial again. No medication interactions. Follow up with psychiatry in a month. Follow up with PCP in 3-4 weeks if not working for insomnia   - QUEtiapine (SEROQUEL) 50 MG tablet; Take 25 mg (1/2 tab) at bedtime as needed for insomnia for 3-5 days, if no change in sleep may increase to 50 mg (1 tab)     History of recent hospitalization/Alcohol abuse/ Alcohol-induced acute pancreatitis, unspecified complication status    Reviewed ER note from [place], CT showed possible necrosis area of pancrease, they reviewed with GI she was stable, did not add antibiotics. Labs relatively stable other than lipase which was 924.  Given lots of IVFs, was eating after a day or two, no withdrawals. Abdominal symptoms improved. She was not on oxycodone when she started naltrexone.  Was started on naltrexone in hospital on 2/9, denies side effects.  Getting some help with alcohol assessments.   - naltrexone (DEPADE/REVIA) 50 MG tablet; Take 1 tablet (50 mg) by mouth daily            Return in about 4 weeks (around 3/16/2021).    RG Raygoza, NP-C  [facility]   Hilaria is a 30 year old who presents for the following health issues  accompanied by her self:    HPI          hx of insomnia. Was on seroquel in the past. Doesn't feel she gave it enough of a try.  Also hx of being on amitriptyline. Needs to follow up with psychiatry      2/6/2021 Hx of pancreatitis induced by alcohol. Was given medication to curb her cravings-naltrexone 50 mg daily. Is admitting she has a problem, getting help now.  Has alcohol abuse assessment coming up Tuesday at 2 pm. Hx of 1 liter vodka a day, no withdrawal symptoms in the hospital, discharge on  2/9    Hx of massive PE, on anticoagulations. No issues taking those    Hx of COVID-19:  No symptoms    Supposed to meet with psychiatist next month        Review of Systems   Constitutional, HEENT, cardiovascular, pulmonary, gi and gu systems are negative, except as otherwise noted.      Objective           Vitals:  No vitals were obtained today due to virtual visit.    Physical Exam   healthy, alert and no distress  PSYCH: Alert and oriented times 3; coherent speech, normal   rate and volume, able to articulate logical thoughts, able   to abstract reason, no tangential thoughts, no hallucinations   or delusions  Her affect is normal  RESP: No cough, no audible wheezing, able to talk in full sentences  Remainder of exam unable to be completed due to telephone visits    Reviewed ER note          Phone call duration: 20 minutes

## 2021-02-17 ENCOUNTER — HOME INFUSION (PRE-WILLOW HOME INFUSION) (OUTPATIENT)
Dept: PHARMACY | Facility: CLINIC | Age: 31
End: 2021-02-17

## 2021-02-17 DIAGNOSIS — E87.6 HYPOKALEMIA: ICD-10-CM

## 2021-02-17 RX ORDER — POTASSIUM CHLORIDE 1500 MG/1
20 TABLET, EXTENDED RELEASE ORAL 2 TIMES DAILY
Qty: 60 TABLET | Refills: 0 | Status: SHIPPED | OUTPATIENT
Start: 2021-02-17 | End: 2021-03-24

## 2021-02-17 RX ORDER — DULOXETIN HYDROCHLORIDE 60 MG/1
60 CAPSULE, DELAYED RELEASE ORAL 2 TIMES DAILY
Qty: 60 CAPSULE | Refills: 1 | Status: SHIPPED | OUTPATIENT
Start: 2021-02-17 | End: 2021-04-21

## 2021-02-17 NOTE — TELEPHONE ENCOUNTER
Signed Prescriptions:                        Disp   Refills    DULoxetine (CYMBALTA) 60 MG capsule        60 cap*1        Sig: Take 1 capsule (60 mg) by mouth 2 times daily  Authorizing Provider: JOSE GIORDANO MD  [facility]

## 2021-02-17 NOTE — TELEPHONE ENCOUNTER
A prescription for lyrica 200 mg was sent to the pharmacy on 2/3/21 with 3 additional refills.     ANGE Diallo, RN-BC  Patient Care Supervisor  [facility] [place]

## 2021-02-18 DIAGNOSIS — M62.838 MUSCLE SPASM: ICD-10-CM

## 2021-02-18 RX ORDER — METHOCARBAMOL 500 MG/1
500 TABLET, FILM COATED ORAL 4 TIMES DAILY PRN
Qty: 120 TABLET | Refills: 0 | Status: SHIPPED | OUTPATIENT
Start: 2021-02-18 | End: 2021-05-06

## 2021-02-18 NOTE — TELEPHONE ENCOUNTER
Received fax from pharmacy requesting refill(s) for methocarbamol (ROBAXIN) 500 MG tablet     Last refilled on 01/21/21    Pt last seen on 01/21/21  Next appt scheduled for 03/03/21    E-prescribe to:  [id number] - [place], [place] - [address] AT [facility]     Will facilitate refill.      Amanda Joyce MA  [facility] [place]

## 2021-02-18 NOTE — TELEPHONE ENCOUNTER
Signed Prescriptions:                        Disp   Refills    methocarbamol (ROBAXIN) 500 MG tablet      120 ta*0        Sig: Take 1 tablet (500 mg) by mouth 4 times daily as           needed for muscle spasms  Authorizing Provider: JOSE GIORDANO MD  [facility]

## 2021-02-19 NOTE — PROGRESS NOTES
This is a recent snapshot of the patient's [facility] medical record.  For current drug dose and complete information and questions, call [phone] or In Basket pool, fv home infusion ([id number])  CSN Number:  [id number]

## 2021-02-22 ENCOUNTER — HOME INFUSION (PRE-WILLOW HOME INFUSION) (OUTPATIENT)
Dept: PHARMACY | Facility: CLINIC | Age: 31
End: 2021-02-22

## 2021-02-23 NOTE — PROGRESS NOTES
This is a recent snapshot of the patient's [facility] medical record.  For current drug dose and complete information and questions, call [phone] or In Basket pool, fv home infusion ([id number])  CSN Number:  [id number]

## 2021-02-23 NOTE — TELEPHONE ENCOUNTER
This writer attempted to contact pt on 02/23/21      Reason for call schedule fasting lab visit and unable to leave message - unable to take call automated message, no VM.      If patient calls back:   Schedule Lab appointment within 1 week with lab, document that pt called and close encounter         Noemi Wright

## 2021-02-23 NOTE — TELEPHONE ENCOUNTER
Please call and advise- patient has not read [employer] message or scheduled labs      Aleksey Sepulvedaunda  I see that you have not yet scheduled a fasting lab only appointment.  We also need to check your potassium as soon as possible.  Your potassium may have been low from your alcohol use and I want to avoid a need for an emergency department visit.  It is very important that we monitor your potassium as well as blood sugar.  Abnormal potassium levels can lead to heart conditions and other problems. Please schedule a fasting lab only appointment at your earliest convenience.   Please call or MyChart my office with any questions or concerns.    Crissy Madrigal, PAC

## 2021-02-24 NOTE — TELEPHONE ENCOUNTER
This writer attempted to contact pt on 02/24/21      Reason for call schedule fasting labs and unable to LM.      If patient calls back:   Schedule Lab appointment within 1 week with lab, document that pt called and close encounter         aCrolann Flores MA

## 2021-02-25 ENCOUNTER — HOME INFUSION (PRE-WILLOW HOME INFUSION) (OUTPATIENT)
Dept: PHARMACY | Facility: CLINIC | Age: 31
End: 2021-02-25

## 2021-02-25 NOTE — TELEPHONE ENCOUNTER
LM for pt to call clinic.  3rd attempt, with no response.  Please advise.      Carolann ESTRELLA, Patient Care

## 2021-03-01 ENCOUNTER — HOME INFUSION (PRE-WILLOW HOME INFUSION) (OUTPATIENT)
Dept: PHARMACY | Facility: CLINIC | Age: 31
End: 2021-03-01

## 2021-03-01 ENCOUNTER — APPOINTMENT (OUTPATIENT)
Dept: LAB | Facility: CLINIC | Age: 31
End: 2021-03-01
Attending: PSYCHIATRY & NEUROLOGY
Payer: COMMERCIAL

## 2021-03-01 NOTE — PROGRESS NOTES
This is a recent snapshot of the patient's [facility] medical record.  For current drug dose and complete information and questions, call [phone] or In Basket pool, fv home infusion ([id number])  CSN Number:  [id number]

## 2021-03-02 ENCOUNTER — VIRTUAL VISIT (OUTPATIENT)
Dept: PALLIATIVE MEDICINE | Facility: CLINIC | Age: 31
End: 2021-03-02
Payer: COMMERCIAL

## 2021-03-02 DIAGNOSIS — R73.01 ELEVATED FASTING GLUCOSE: ICD-10-CM

## 2021-03-02 DIAGNOSIS — E87.6 HYPOKALEMIA: ICD-10-CM

## 2021-03-02 DIAGNOSIS — G61.81 CHRONIC INFLAMMATORY DEMYELINATING POLYNEUROPATHY (H): Primary | ICD-10-CM

## 2021-03-02 DIAGNOSIS — R20.2 PARESTHESIAS: ICD-10-CM

## 2021-03-02 DIAGNOSIS — M62.838 MUSCLE SPASM: ICD-10-CM

## 2021-03-02 LAB
ANION GAP SERPL CALCULATED.3IONS-SCNC: 1 MMOL/L (ref 3–14)
BUN SERPL-MCNC: 7 MG/DL (ref 7–30)
CALCIUM SERPL-MCNC: 8.9 MG/DL (ref 8.5–10.1)
CHLORIDE SERPL-SCNC: 108 MMOL/L (ref 94–109)
CO2 SERPL-SCNC: 31 MMOL/L (ref 20–32)
CREAT SERPL-MCNC: 0.67 MG/DL (ref 0.52–1.04)
GFR SERPL CREATININE-BSD FRML MDRD: >90 ML/MIN/{1.73_M2}
GLUCOSE SERPL-MCNC: 112 MG/DL (ref 70–99)
HBA1C MFR BLD: 5 % (ref 0–5.6)
POTASSIUM SERPL-SCNC: 4.1 MMOL/L (ref 3.4–5.3)
SODIUM SERPL-SCNC: 140 MMOL/L (ref 133–144)

## 2021-03-02 PROCEDURE — 80048 BASIC METABOLIC PNL TOTAL CA: CPT | Performed by: PHYSICIAN ASSISTANT

## 2021-03-02 PROCEDURE — 96158 HLTH BHV IVNTJ INDIV 1ST 30: CPT | Mod: 95 | Performed by: PSYCHOLOGIST

## 2021-03-02 PROCEDURE — 36415 COLL VENOUS BLD VENIPUNCTURE: CPT | Performed by: PHYSICIAN ASSISTANT

## 2021-03-02 PROCEDURE — 83036 HEMOGLOBIN GLYCOSYLATED A1C: CPT | Performed by: PHYSICIAN ASSISTANT

## 2021-03-02 NOTE — PROGRESS NOTES
"Hilaria Bonner is a 30 year old female who is being evaluated via a billable telephone visit.      The patient has been notified of following:     \"This telephone visit will be conducted via a call between you and Jasmin Prince PsyD LP. We have found that certain health care needs can be provided without the need for an in-person session.  This service lets us provide the care you need with a phone conversation.       If during the course of the call I feel a telephone visit is not appropriate, you will not be charged for this service.\"      Reviewed that patient is in a quiet private place, no recording without permission, all apps and notifications of any devices are turned off. Began the session by talking about the risks and benefits of telehealth, what to do if there is a break in the connection, reviewed the safety protocol for during and after sessions. The purpose of using telehealth for this session was due to the COVID-19 pandemic and was to reduce the spread of the disease and protect both the clinician and client.             Patient has given verbal consent for telephone visit? YES    Phone call contact time  Call Started at 10:19 AM - patient was en route home from lab, called her again at this time when she had arrived home.  Call Ended at 10:54 AM  Total 35 minutes     Pain Diagnoses per pain provider:   Chronic inflammatory demyelinating polyneuropathy (H)     Paresthesias     Muscle spasm    DATA: During today's visit you reported the following: Your pain is about the same. Your mood is ok - haven't had any 'meltdowns' lately related to over-thinking. Your activity level is about the same. Your stress level is the same - still quite high 'I'm always stressing about something'. Your sleep is mildly improved, having 'weird' dreams. You reported engaging in self-care for your pain 1-3 times daily.    You identified that you would like to focus on the following or had questions regarding the " following issues or concerns, and we discussed the following:   - report you are still sober  - still having craving for alcohol despite prescription for Naltrexone  - have not yet completed Rule 25 - believe scheduled later this week but need to reschedule  - picked up some books to prevent boredom, haven't started yet  - believe boredom is trigger for drinking as well as pain  - worried with son returning to school 2 days a week, especially as this is a trigger to drinking  - reach out to Dr. Madrigal for addiction medicine information as you are in need of refill for Naltrexone  - discussed her 'weird' dreams could be related to her body acclimating to sobriety and improved sleep    ASSESSMENT: Seem to be working on identifying some triggers to alcohol use, and working on identifying self-soothing activities.    PLAN:   Your next appointment is scheduled for 4/6 at 2:00 PM.  Assignment/Objectives /interventions for next session:   - continue to cultivate skills to manage boredom and engage in self-soothing  - Reschedule Rule 25 due to your reported conflict on Thursday  - contact PCP Dr. Madrigal regarding Naltrexone through Addiction Medicine (or next steps for this medication to be refilled)    Telephone-Visit Details    Type of service:  Telephone Visit    Originating Location (pt. Location): Home    Distant Location (provider location):  [place]     Mode of Communication:  Telephone    I have reviewed the note as documented above.  This accurately captures the substance of my conversation with the patient.    Jasmin Prince PsyD LP  Licensed Psychologist  Outpatient Clinic Therapist  [facility]    Disclaimer: This note consists of symbols derived from keyboarding, dictation and/or voice recognition software. As a result, there may be errors in the script that have gone undetected. Please consider this when interpreting information found in this chart.

## 2021-03-02 NOTE — PROGRESS NOTES
This is a recent snapshot of the patient's [facility] medical record.  For current drug dose and complete information and questions, call [phone] or In Basket pool, fv home infusion ([id number])  CSN Number:  [id number]

## 2021-03-02 NOTE — RESULT ENCOUNTER NOTE
"Dear Hilaria  Your blood sugar is borderline elevated.    This is in the \"prediabetes\" range.  You are not currently diabetic, but at risk for developing this disease in the future.   Exercise, weight loss,  and limiting carbohydrates and sugars in the diet can be helpful to avoid progression to diabetes.  At minimum we need to check your blood sugar annually.    Please be seen urgently if you develop any signs or symptoms of diabetes (increase thirst or urination, fatigue, blurry vision, unexplained weight loss).    You do not have diabetes.   You potassium is in the normal range. Continue potassium supplement as you have been taking .  Please call or MyChart my office with any questions or concerns.    Crissy Madrigal, PAC      "

## 2021-03-03 ENCOUNTER — DOCUMENTATION ONLY (OUTPATIENT)
Dept: CARE COORDINATION | Facility: CLINIC | Age: 31
End: 2021-03-03

## 2021-03-03 ENCOUNTER — VIRTUAL VISIT (OUTPATIENT)
Dept: PALLIATIVE MEDICINE | Facility: CLINIC | Age: 31
End: 2021-03-03
Payer: COMMERCIAL

## 2021-03-03 DIAGNOSIS — M62.838 MUSCLE SPASM: ICD-10-CM

## 2021-03-03 DIAGNOSIS — G61.81 CHRONIC INFLAMMATORY DEMYELINATING POLYNEUROPATHY (H): Primary | ICD-10-CM

## 2021-03-03 DIAGNOSIS — R20.2 PARESTHESIAS: ICD-10-CM

## 2021-03-03 PROCEDURE — 99213 OFFICE O/P EST LOW 20 MIN: CPT | Mod: 95 | Performed by: PHYSICAL MEDICINE & REHABILITATION

## 2021-03-03 NOTE — PATIENT INSTRUCTIONS
1. No medication changes made today.     2. Continue working on your exercises at home. Consider addition of chronic pain PT in the future if needed.     3. Follow up with RG Mccoy CNP in 8 weeks.     Sheree Chester MD  [facility]     ----------------------------------------------------------------  Clinic Number:  [phone]     Call with any questions about your care and for scheduling assistance.     Calls are returned Monday through Friday between 8 AM and 4:30 PM. We usually get back to you within 2 business days depending on the issue/request.    If we are prescribing your medications:    For opioid medication refills, call the clinic or send a [employer] message 7 days in advance.  Please include:    Name of requested medication    Name of the pharmacy.    For non-opioid medications, call your pharmacy directly to request a refill. Please allow 3-4 days to be processed.     Per [place] State Law:    All controlled substance prescriptions must be filled within 30 days of being written.      For those controlled substances allowing refills, pickup must occur within 30 days of last fill.      We believe regular attendance is key to your success in our program!      Any time you are unable to keep your appointment we ask that you call us at least 24 hours in advance to cancel.This will allow us to offer the appointment time to another patient.     Multiple missed appointments may lead to dismissal from the clinic.

## 2021-03-03 NOTE — PROGRESS NOTES
Hilaria is a 30 year old who is being evaluated via a billable video visit.      How would you like to obtain your AVS? MyChart  If the video visit is dropped, the invitation should be resent by: Text to cell phone: [phone]  Will anyone else be joining your video visit? No      Date of Visit: 3/3/2021.    The patient was last seen by me on 1/21/2021.     Recommendations at last visit:   1.  Pain Physical Therapy:    Encouraged Hilaria continue neuro physical therapy and occupational therapy on a regular basis as recommended and as planned. Could consider pain physical therapy once completed.    2.  Pain Psychologist to address relaxation, behavioral change, coping style, and other factors important to improvement.      Encouraged she continue visits with Jasmin Prince PsyD, LP. I am hopeful these resources will provide some benefit. She has not scheduled an appointment yet.    3.  Medication Management:     1. Start methocarbamol 500 mg QID prn. If tolerating well may increase to 1,000 mg QID prn.    2. Unclear of degree of benefit from Cymbalta. Advised she continue Cymbalta 120mg daily for now. If we do not see significant pain or mood benefit in the next two months, we will likely taper off.      3. Continue Lyrica 200mg TID- max dose. We discussed that there are few medications left that I would recommend starting. May consider a cross taper to Effexor if interested. Amitriptyline was not helpful. Muscle relaxants have not been helpful.      4. Continue medical cannabis as able/finances allow. I would suggest reaching out to the pharmacist with the dispensary to see which products or adjustments they may make.    4.  Potential procedures: not at this time.     5.  Referrals: None   6.  Continue utilizing the TENS unit.    7.  Follow up with me for the already scheduled appointment on 3/18.        Additional provider notes:  - Since the last visit Hilaria was admitted to the hospital for  "alcohol-induced pancreatitis. She reported drinking 1 liter of vodka daily. She has a referral to see addiction medicine. Does not have appointment set up yet.   - Has not had any alcohol since hospitalization.   - Overall her pain is about the same as it was at the last visit.   - Still continues to have high stress levels. Mood has been about the same.   - She is opening up more now in pain psychology so this may be more helpful   - She has been discharged from PT and OT. Still doing HEP regularly.    - TENS has not been helpful.     Current pain medications:              Lyrica 200mg TID- [facility], \"a little bit\"               Cymbalta 120mg daily- ?, we tried decreasing to 90mg daily without improvement in sweating so recently increased to 120mg daily              Hydroxyzine 25-50mg prn- H for anxiety, ran out of              Folate 1mg daily    Medical cannabis (starter kit- CBD dominant capsules/vapor, CBD/THC capsules/pills, and THC dominant capsules/vapor) - H with sleep but not pain.    Methocarbamol 500 mg QID prn - [facility] for spasms   Topamax      Naltrexone   Seroquel   Wellbutrin     1. Previous Pain Relevant Medications:  NOTE: This medication information taken from patient's intake form, not medical records.               Opiates: Tramadol- [facility], oxycodone- ?/[facility]              NSAIDS: doesn't take anti-inflammatories due to gastric bypass              Muscle Relaxants: tizanidine- H for sleep only              Anti-migraine mediations: no              Anti-depressants: amitriptyline (up to 75mg)- ?, \"it put me to sleep\", Cymbalta- ?              Sleep aids: no              Anxiolytics: hydroxyzine- H               Neuropathics: Lyrica- [facility], gabapentin (started on for numbness in toes and migraines after cardiac arrest)- SE, fatigue, Topamax- H for weight loss, NH for pain              Topicals: gabapentin cream- NH/SWH, lidocaine cream- NH, W, burning, capsaicin cream- NH, W, burning              Other " medications not covered above: Tylenol- NH     2. Physical Therapy: going to neuro PT and OT   3. Pain Psychology: yes Jasmin Prince PsyD, LP x 3 visit   4. Surgery: gastric bypass March 2019  5. Injections: no  6. Chiropractic: no  7. Acupuncture: yes x4 sessions with José Ze DC - [place]  8. TENS Unit: no, interested in one    Assessment:  Hilaria Bonner is a 29 year old female with a past medical history significant for PE with associated PEA arrest, morbid obesity s/p sleeve gastrectomy, pancreatitis, and recent COVID 19 who presents with complaints of upper and lower extremity pain.      1. 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  2. Alcohol dependence: Has referral to see addiction medicine.    3. Mental Health - the patient's mental health concerns, specifically anxiety, affect her experience of pain and contribute to her clinically significant distress.       Plan:     1.  Therapy: Continue HEP daily. Briefly discussed chronic pain PT but at this point she feels like she is doing well with her exercises at home.     2.  Pain Psychologist to address relaxation, behavioral change, coping style, and other factors important to improvement. Continue visits with Jasmin Prince PsyD, LP.   3.  Medication Management:     1. Continue methocarbamol 500 mg QID prn. If tolerating well may increase to 1,000 mg QID prn.    2. Unclear of degree of benefit from Cymbalta. Advised she continue Cymbalta 120mg daily for now.    3. Continue Lyrica 200mg TID- max dose.     4. Continue medical cannabis as able/finances allow.    4.  Potential procedures: not at this time.     5.  Referrals: None   6.  Continue utilizing the TENS unit.    7.  Follow up with RG Mccoy CNP in 8 weeks.     Sheree Chester MD  [facility]     Video Start Time: 10:37 am  Video-Visit  Details    Type of service:  Video Visit    Video End Time:10:52 am    Originating Location (pt. Location): Home    Distant Location (provider location):  [facility] [place]     Platform used for Video Visit: Docal[employer]    BILLING TIME DOCUMENTATION:   The total TIME spent on this patient on the date of the encounter/appointment was 18 minutes.      TOTAL TIME includes:   Time spent preparing to see the patient (reviewing records and tests)   Time spent face to face (or over the phone) with the patient   Time spent documenting clinical information in Epic

## 2021-03-04 ENCOUNTER — HOME INFUSION (PRE-WILLOW HOME INFUSION) (OUTPATIENT)
Dept: PHARMACY | Facility: CLINIC | Age: 31
End: 2021-03-04

## 2021-03-05 ENCOUNTER — TELEPHONE (OUTPATIENT)
Dept: ADDICTION MEDICINE | Facility: CLINIC | Age: 31
End: 2021-03-05

## 2021-03-05 NOTE — TELEPHONE ENCOUNTER
"Please schedule appointment for patient with   Addiction Medicine Provider     For alcohol use disorder    Appt type: in person preferred      Our scheduling staff will offer the following information:   Please invite our patient to call [facility]'s assessment line - [phone]. They can ask for a \"substance use assessment\" or \"rule 25\". This will allow them to discuss possible psychosocial treatment options including individual therapy or any group options including outpatient, intensive outpatient, or residential (aka inpatient) treatment.     John Garrido MD    "

## 2021-03-05 NOTE — PROGRESS NOTES
This is a recent snapshot of the patient's [facility] medical record.  For current drug dose and complete information and questions, call [phone] or In Basket pool, fv home infusion ([id number])  CSN Number:  [id number]

## 2021-03-16 ENCOUNTER — TELEPHONE (OUTPATIENT)
Dept: ENDOCRINOLOGY | Facility: CLINIC | Age: 31
End: 2021-03-16

## 2021-03-16 NOTE — TELEPHONE ENCOUNTER
No show for today's phone visit. Called pt x 3, no answer. LVM with number to reschedule.     Jessie Linton RD, LD

## 2021-03-17 ENCOUNTER — PATIENT OUTREACH (OUTPATIENT)
Dept: CARE COORDINATION | Facility: CLINIC | Age: 31
End: 2021-03-17

## 2021-03-17 NOTE — PROGRESS NOTES
Clinic Care Coordination Contact    Follow Up Progress Note      Assessment: Outreach to patient. Left message on voicemail and requested return call.     Goals addressed this encounter:   Goals Addressed                 This Visit's Progress      Improve chronic symptoms (pt-stated)   20%     Goal Statement: I want the burning pain in my hands to improve    Date Goal set: 6/12/20    Barriers: Polyneuropathy    Strengths: Family support    Date to Achieve By: June 2021  Patient expressed understanding of goal: yes    Action steps to achieve this goal:  1. I will continue Lyrica TID  2. I will use Tramadol as instructed for pain  3. I will participate in outpatient therapies (completed therapies 2/2021)        Intervention/Education provided during outreach: Requested return call     Outreach Frequency: monthly    Plan:   Patient will call RN clinic care coordinator back.     Care Coordinator will follow up in one month if no response.    Monica Hernandez RN, [facility] RN Coordinator  [facility]   3/17/2021    1:24 PM  [phone]

## 2021-03-22 ENCOUNTER — VIRTUAL VISIT (OUTPATIENT)
Dept: BEHAVIORAL HEALTH | Facility: CLINIC | Age: 31
End: 2021-03-22
Payer: COMMERCIAL

## 2021-03-22 ENCOUNTER — VIRTUAL VISIT (OUTPATIENT)
Dept: PSYCHIATRY | Facility: CLINIC | Age: 31
End: 2021-03-22
Attending: PHYSICIAN ASSISTANT
Payer: COMMERCIAL

## 2021-03-22 DIAGNOSIS — F29 PSYCHOSIS, UNSPECIFIED PSYCHOSIS TYPE (H): ICD-10-CM

## 2021-03-22 DIAGNOSIS — F41.1 GAD (GENERALIZED ANXIETY DISORDER): ICD-10-CM

## 2021-03-22 DIAGNOSIS — F10.10 ALCOHOL ABUSE: ICD-10-CM

## 2021-03-22 DIAGNOSIS — F32.1 MODERATE MAJOR DEPRESSION (H): Primary | ICD-10-CM

## 2021-03-22 DIAGNOSIS — F32.1 MODERATE MAJOR DEPRESSION (H): ICD-10-CM

## 2021-03-22 DIAGNOSIS — F41.1 GENERALIZED ANXIETY DISORDER: ICD-10-CM

## 2021-03-22 DIAGNOSIS — G31.89 COGNITIVE AND NEUROBEHAVIORAL DYSFUNCTION FOLLOWING BRAIN INJURY (H): Primary | ICD-10-CM

## 2021-03-22 DIAGNOSIS — F09 COGNITIVE AND NEUROBEHAVIORAL DYSFUNCTION FOLLOWING BRAIN INJURY (H): Primary | ICD-10-CM

## 2021-03-22 DIAGNOSIS — S06.9XAS COGNITIVE AND NEUROBEHAVIORAL DYSFUNCTION FOLLOWING BRAIN INJURY (H): Primary | ICD-10-CM

## 2021-03-22 PROCEDURE — 99417 PROLNG OP E/M EACH 15 MIN: CPT | Mod: 95 | Performed by: PSYCHIATRY & NEUROLOGY

## 2021-03-22 PROCEDURE — 99205 OFFICE O/P NEW HI 60 MIN: CPT | Mod: 95 | Performed by: PSYCHIATRY & NEUROLOGY

## 2021-03-22 PROCEDURE — 90791 PSYCH DIAGNOSTIC EVALUATION: CPT | Mod: 52 | Performed by: SOCIAL WORKER

## 2021-03-22 RX ORDER — QUETIAPINE FUMARATE 100 MG/1
150 TABLET, FILM COATED ORAL AT BEDTIME
Qty: 45 TABLET | Refills: 1 | Status: SHIPPED | OUTPATIENT
Start: 2021-03-22 | End: 2021-04-22

## 2021-03-22 SDOH — HEALTH STABILITY: MENTAL HEALTH: HOW MANY STANDARD DRINKS CONTAINING ALCOHOL DO YOU HAVE ON A TYPICAL DAY?: NOT ASKED

## 2021-03-22 SDOH — HEALTH STABILITY: MENTAL HEALTH: HOW OFTEN DO YOU HAVE A DRINK CONTAINING ALCOHOL?: NOT ASKED

## 2021-03-22 SDOH — HEALTH STABILITY: MENTAL HEALTH: HOW OFTEN DO YOU HAVE 6 OR MORE DRINKS ON ONE OCCASION?: NOT ASKED

## 2021-03-22 ASSESSMENT — ANXIETY QUESTIONNAIRES
5. BEING SO RESTLESS THAT IT IS HARD TO SIT STILL: NOT AT ALL
3. WORRYING TOO MUCH ABOUT DIFFERENT THINGS: NEARLY EVERY DAY
6. BECOMING EASILY ANNOYED OR IRRITABLE: NEARLY EVERY DAY
2. NOT BEING ABLE TO STOP OR CONTROL WORRYING: NEARLY EVERY DAY
GAD7 TOTAL SCORE: 14
7. FEELING AFRAID AS IF SOMETHING AWFUL MIGHT HAPPEN: NOT AT ALL
1. FEELING NERVOUS, ANXIOUS, OR ON EDGE: MORE THAN HALF THE DAYS
IF YOU CHECKED OFF ANY PROBLEMS ON THIS QUESTIONNAIRE, HOW DIFFICULT HAVE THESE PROBLEMS MADE IT FOR YOU TO DO YOUR WORK, TAKE CARE OF THINGS AT HOME, OR GET ALONG WITH OTHER PEOPLE: EXTREMELY DIFFICULT

## 2021-03-22 ASSESSMENT — PATIENT HEALTH QUESTIONNAIRE - PHQ9
SUM OF ALL RESPONSES TO PHQ QUESTIONS 1-9: 22
5. POOR APPETITE OR OVEREATING: NEARLY EVERY DAY

## 2021-03-22 NOTE — Clinical Note
Aleksey,    I met with Hilaria today.  She will likely need a psychiatrist over the long term, but I will meet with her to address her acute symptoms.  She does not feel the Cymbalta is helpful for pain, mood, or anxiety, so we may want to switch to something else for her psychiatric issues.  Is that OK with you?    Please feel free to contact me with questions or concerns.  Thank you for the opportunity to be involved in the care of this patient.    Regards,  Sheree Matute MD  Collaborative Care Psychiatry  [facility]

## 2021-03-22 NOTE — PATIENT INSTRUCTIONS
Increase quetiapine to 150 mg at bedtime if tolerated, you may decrease to 100 mg at bedtime if preferred.    Continue all other medications per your primary care provider.    Schedule an appointment with me in 4 weeks or sooner as needed.  You may call [facility] at [phone] to schedule.    [place] Resources:      Go to the Emergency Department as needed or call after hours crisis line at [phone].      To schedule individual or family therapy, call [facility] at [phone].     Follow up with primary care provider as planned or sooner for acute medical concerns.    Call the psychiatric nurse line with medication questions or concerns at [phone].    Plug.dj may be used to communicate with your provider, but this is not intended to be used for emergencies.    Community Resources:      National Suicide Prevention Lifeline: 881.580.8451 (TTY: 444.281.6318). Call anytime for help.  (www.suicidepreventionlifeline.org)    National [place] on Mental Illness (www.joe.org): 295.151.4689 or 470-984-4991.     Mental Health Association (www.mentalhealth.org): 188.375.9025 or 739-733-3022.    [place] Crisis Text Line: Text [place] to 576097    Suicide LifeLine Chat: suicidepreventionlifeline.org/chat
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
## 2021-03-23 DIAGNOSIS — E87.6 HYPOKALEMIA: ICD-10-CM

## 2021-03-23 ASSESSMENT — ANXIETY QUESTIONNAIRES: GAD7 TOTAL SCORE: 14

## 2021-03-24 RX ORDER — POTASSIUM CHLORIDE 1500 MG/1
20 TABLET, EXTENDED RELEASE ORAL 2 TIMES DAILY
Qty: 180 TABLET | Refills: 1 | Status: SHIPPED | OUTPATIENT
Start: 2021-03-24 | End: 2021-04-08 | Stop reason: SINTOL

## 2021-03-27 DIAGNOSIS — E55.9 VITAMIN D DEFICIENCY: ICD-10-CM

## 2021-03-30 ENCOUNTER — HOME INFUSION (PRE-WILLOW HOME INFUSION) (OUTPATIENT)
Dept: PHARMACY | Facility: CLINIC | Age: 31
End: 2021-03-30

## 2021-03-31 ENCOUNTER — TELEPHONE (OUTPATIENT)
Dept: FAMILY MEDICINE | Facility: CLINIC | Age: 31
End: 2021-03-31

## 2021-03-31 ENCOUNTER — OFFICE VISIT (OUTPATIENT)
Dept: NEUROLOGY | Facility: CLINIC | Age: 31
End: 2021-03-31
Payer: COMMERCIAL

## 2021-03-31 ENCOUNTER — ANCILLARY PROCEDURE (OUTPATIENT)
Dept: GENERAL RADIOLOGY | Facility: CLINIC | Age: 31
End: 2021-03-31
Attending: NURSE PRACTITIONER
Payer: COMMERCIAL

## 2021-03-31 VITALS
HEART RATE: 107 BPM | DIASTOLIC BLOOD PRESSURE: 93 MMHG | OXYGEN SATURATION: 98 % | RESPIRATION RATE: 16 BRPM | SYSTOLIC BLOOD PRESSURE: 135 MMHG

## 2021-03-31 DIAGNOSIS — S22.000A COMPRESSION FRACTURE OF THORACIC VERTEBRA, INITIAL ENCOUNTER, UNSPECIFIED THORACIC VERTEBRAL LEVEL: Primary | ICD-10-CM

## 2021-03-31 DIAGNOSIS — M54.6 BILATERAL THORACIC BACK PAIN, UNSPECIFIED CHRONICITY: ICD-10-CM

## 2021-03-31 DIAGNOSIS — G61.81 CIDP (CHRONIC INFLAMMATORY DEMYELINATING POLYNEUROPATHY) (H): Primary | ICD-10-CM

## 2021-03-31 LAB — RADIOLOGIST FLAGS: NORMAL

## 2021-03-31 PROCEDURE — 72070 X-RAY EXAM THORAC SPINE 2VWS: CPT | Performed by: STUDENT IN AN ORGANIZED HEALTH CARE EDUCATION/TRAINING PROGRAM

## 2021-03-31 PROCEDURE — 99214 OFFICE O/P EST MOD 30 MIN: CPT | Performed by: PSYCHIATRY & NEUROLOGY

## 2021-03-31 NOTE — TELEPHONE ENCOUNTER
Patient is calling in regards to her X-Ray results and has questions about them. Please call patient to inform    NILAY Law

## 2021-03-31 NOTE — PROGRESS NOTES
This is a recent snapshot of the patient's [facility] medical record.  For current drug dose and complete information and questions, call [phone] or In Basket pool, [facility] ([id number])  CSN Number:  [id number]

## 2021-03-31 NOTE — LETTER
"3/31/2021       RE: Hilaria Bonner  [address]  [address]  [facility] [id number]     Dear Colleague,    Thank you for referring your patient, Hilaria Bonner, to the [facility] [place] at [facility]. Please see a copy of my visit note below.    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  In April 2020 she developed confirmed COVID infection. About 4 weeks later her neurologic symptoms began. Her first symptom was tingling in her hands where it then  progressed to a burning in her hands about 4-5 days after the numbness.  It then quickly progressed to involving bilateral legs below the knee and then her feet. She felt weak in her hands and feet. Fine motor tasks and gait became impaired. She has trouble picking up objects because she has to watch herself  objects. She needed a walker. She also felt that her speech became periodically slurred. The sensory symptoms that included pain and allodynia were most problematic in her hands and feet. NCS in 7/20 showed absent sensory responses in the upper and lower limbs and normal motor responses. In 8/20 IVIG 2 gm/kg loading and then 1 gm/kg q 3 week maintenance was started. Through the fall 2020 she made slow improvements, particular in function and gait. By 11/20 she felt \"50%\" better. In 1/21 she reduced IVIG from 1 gm/kg q 3 weeks to q 4 week.     Interval History:   We last saw her 01/13/21. Since our last visit IVIG frequency was changed from q3 to q4 weeks. By the 3rd week she noted the numbness in her hands and feet is more intense. She also appreciates more difficulty walking by the 3rd weeks as the legs feel \"heavy\". These symptoms then improve after next IVIG. She continues to have numbness up to the mid calf and wrist " bilaterally.  Overall no changes in balance and she does not use supportive device for assistance. She does have a walker at home but does not use it. She recently developed back pain which has been limiting for her.  She noted the back pain has made it difficult to walk.  She stated she started lidocaine patches for the back pain but was not helpful. She continues to take Lyrica 200mg TID, Cymbalta 60mg BID, medical marijuana and uses a TENS unit for pain. She does follow in the Pain clinic. We did repeat her NCS on 1/13/21 which did show unequivocal interval improvement in sensory response amplitudes in the lower > upper limbs.     Prior pertinent laboratory work-up:  5/2020:  ANH 1:160. Vitamin B1 low (45). B12 low/normal (285). Negative/normal double stranded DNA, ANCA, C-reactive, RF, GM1, GD1, Gq1b, vitamin A, B6, Vit E, SSA, SSB undetectable.  6/2020 CSF: 0 WBC, 0 RBC, protein 58 glucose 29.  7/2020: Normal Vitamin B1, B12, folate    8/2020: TS-HDS mildly elevated at [address] ([id number]). Negative FGFR3, Immunofixation, paraneoplastic panel,GD1a.  12/20: B1 low (66)  1/2021:  B1 and B6 slightly elevated (B1 = 193, B6 = 182). Normal B12    Prior electrophysiologic work-up:  7/23/20 NCS/EMG showed all absent upper and lower limb SNAPS and all normal upper and lower limb motor responses.   8/3/20 NCS/EMG showed absent right  median, ulnar, and sural sensory studies with radial having attenuated amplitude.   1/13/21: NCS still showed a non length-dependent sensory neuropathy or ganglionopathy, but compared to prior studies performed 8/3/20 and 7/23/20 there has been unequivocal interval improvement in sensory response amplitudes in the lower > upper limbs.      Prior pertinent imaging work-up:  5/20: MRI brain with and without contrast was essentially normal  5/20: MRI C spine with and without contrast showed no abnormal enhancement in the spinal cord, thecal sac or cervical vertebrae. No spinal canal or neural  foraminal narrowing.    Past Medical History:   Past Medical History:   Diagnosis Date     Acute kidney injury (H) 05/13/2019     Cardiac arrest (H) 05/13/2019     Earache or other ear, nose, or throat complaint 12/15    tyroid     Pulmonary embolus (H) 05/13/2019     Past Surgical History:  Past Surgical History:   Procedure Laterality Date     GYN SURGERY      2 C-sections     KNEE SURGERY  most recently - [id number]    3 surgeries in [place]     LAPAROSCOPIC GASTRIC SLEEVE N/A 3/26/2019    Procedure: Laparoscopic Sleeve Gastrectomy;  Surgeon: Luan oLpez MD;  Location: [facility] OR     ORTHOPEDIC SURGERY      2 knee meniscus surgery     Family history:    There is no known family history of hereditary neuropathies or other neuromuscular disorders.     Social History:    Social History     Tobacco Use     Smoking status: Current Every Day Smoker     Packs/day: 0.30     Years: 1.00     Pack years: 0.30     Types: Cigarettes     Start date: 11/20/2016     Last attempt to quit: 1/12/2018     Years since quitting: 3.2     Smokeless tobacco: Never Used   Substance Use Topics     Alcohol use: Not Currently     Comment: Quit drinking when last hospitalized     Drug use: No     Comment: medical marijuana card.  vapes      Medical Allergies:   No Known Allergies     Current Medications:    Current Outpatient Medications:      Blood Pressure Monitoring (BLOOD PRESSURE MONITOR/ARM) BRAYAN, , Disp: , Rfl:      diphenhydrAMINE (BENADRYL) 50 MG/ML injection, , Disp: , Rfl:      DULoxetine (CYMBALTA) 60 MG capsule, Take 1 capsule (60 mg) by mouth 2 times daily, Disp: 60 capsule, Rfl: 1     folic acid (FOLVITE) 1 MG tablet, Take 1 tablet (1 mg) by mouth daily, Disp: 30 tablet, Rfl: 11     Lidocaine (LIDOCARE) 4 % Patch, Place 1 patch onto the skin every 24 hours To prevent lidocaine toxicity, patient should be patch free for 12 hrs daily., Disp: 10 patch, Rfl: 1     lisinopril (ZESTRIL) 10 MG tablet, Take 1 tablet (10 mg) by mouth  daily, Disp: 90 tablet, Rfl: 0     methocarbamol (ROBAXIN) 500 MG tablet, Take 1 tablet (500 mg) by mouth 4 times daily as needed for muscle spasms, Disp: 120 tablet, Rfl: 0     Multiple Vitamins-Minerals (MULTIVITAMIN ADULT) CHEW, Take 1 chew tab by mouth 2 times daily, Disp: 60 tablet, Rfl: 11     NEW MED, MEDICAL CANABIS, Disp: , Rfl:      omeprazole (PRILOSEC) 20 MG DR capsule, Take 1 capsule (20 mg) by mouth daily, Disp: 28 capsule, Rfl: 11     ondansetron (ZOFRAN-ODT) 4 MG ODT tab, Take 1 tablet (4 mg) by mouth every 8 hours as needed for nausea, Disp: 30 tablet, Rfl: 1     polyethylene glycol (MIRALAX) 17 GM/SCOOP powder, Take 17 g (1 capful) by mouth daily, Disp: 850 g, Rfl: 3     potassium chloride ER (KLOR-CON M) 20 MEQ CR tablet, Take 1 tablet (20 mEq) by mouth 2 times daily, Disp: 180 tablet, Rfl: 1     Pregabalin (LYRICA) 200 MG capsule, Take 1 capsule (200 mg) by mouth 3 times daily, Disp: 90 capsule, Rfl: 3     PRIVIGEN 20 GM/200ML SOLN, , Disp: , Rfl:      PRIVIGEN 40 GM/400ML SOLN, , Disp: , Rfl:      QUEtiapine (SEROQUEL) 100 MG tablet, Take 1.5 tablets (150 mg) by mouth At Bedtime If not tolerated, take 100 mg at bedtime., Disp: 45 tablet, Rfl: 1     rivaroxaban ANTICOAGULANT (XARELTO ANTICOAGULANT) 20 MG TABS tablet, Take 1 tablet (20 mg) by mouth daily (with dinner), Disp: 90 tablet, Rfl: 1     vitamin (B COMPLEX) tablet, Take 1 tablet by mouth daily, Disp: 90 tablet, Rfl: 3     vitamin C (ASCORBIC ACID) 1000 MG TABS, Take 1 tablet (1,000 mg) by mouth daily, Disp: 30 tablet, Rfl: 0     vitamin D3 (CHOLECALCIFEROL) 2000 units (50 mcg) tablet, Take 1 tablet (2,000 Units) by mouth daily, Disp: 30 tablet, Rfl: 0    Current Facility-Administered Medications:      cyanocobalamin injection 1,000 mcg, 1,000 mcg, Intramuscular, Q30 Days, Crissy Madrigal PA-C, 1,000 mcg at 11/10/20 1043     medroxyPROGESTERone (DEPO-PROVERA) syringe 150 mg, 150 mg, Intramuscular, Q90 Days, Crissy Madrigal,  PA-C, 150 mg at 01/04/21 0947    Review of Systems: A complete review of systems was obtained and was negative except for what was noted above.     Physical examination:    BP (!) 135/93   Pulse 107   Resp 16   SpO2 98%     General Appearance: NAD    Skin: There are no rashes or other skin lesions.    Musculoskeletal:  There is no scoliosis, lordosis, kyphosis, pes cavus, or hammertoes.    Neurologic examination:    Mental status:  Patient is alert, attentive, and oriented x 3.  Language is coherent and fluent without aphasia.  Memory, comprehension and ability to follow commands were intact.       Cranial nerves:   Pupils were round and reacted to light.  Extraocular movements were full. There was no face, jaw, palate or tongue weakness or atrophy. Hearing was grossly intact.  Shoulder shrug was normal.       Motor exam: No atrophy or fasciculations.   Manual muscle testing revealed the following MRC grade muscle power:   Right Left   Neck flexion 5    Neck extension: 5    Shoulder abduction:  5 5   Elbow extension: 5 5   Elbow flexion:  5 5   Wrist flexion:  5 5   Wrist extension:  5 5   FDI 4+ 4+   Hip flexion 5 5   Knee flexion 5 5   Knee extension 5 5   Dorsiflexion 5 5   Plantar flexion 5 5   Green indicates improved compared to last exam  Red indicates worse compared to last exam    Complex motor skills: Mild postural hand tremor. Mild ataxia with FNF.    Sensory exam: Pin reduced below the knee and below the elbow bilaterally  Vibration absent at toe and ankle, 3s at knee, absent at finger, wrist, and 4s at the elbow bilaterally. Proprioception absent at the toe and finger bilaterally.       Gait: Base is narrow base, stable. No ataxia.     Deep tendon reflexes:   Right Left   Triceps 1 1   Biceps 2 2   Brachioradialis 2 2   Knee jerk 0 0   Ankle jerk 0 0     Neuropathy Assessments  Neurology Assessments 3/31/2021 1/13/2021 11/18/2020 9/9/2020 8/3/2020   RODS CIDP/MGUSP Score 33 32 29 16 20   Time for 'Up  and Go' test- Seconds: 25.7 (pain limited) 9.79 10.2 15.9 24.09      Strength: 3/31/2021 2021 2020 2020 8/3/2020   Right hand strength in K 25 18 20 16   Left hand strength in K 28 17 22 20     Immunotherapy 3/31/2021 2021 2020 2020 8/3/2020   Time since last IVIG (days): 27 days 1 week 12 5 days -   Current treatment: IVIG 1gm/kg q 4 weeks IVIG 1 gm/kg q 3 weeks IVIG 1gm/kg [id number] IVIG 1 gm/kg q 3 weeks none     Assessment:    Hilaria Bonner is a 30 year old woman with a 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 Noted was a TS-HDS antibody, which is of uncertain significance. The neuropathy is presumed to be immune mediated, but nutritional deficiencies (B1) has been considered and may play a role as well. She has made unequivocal improvement after immunotherapy was started in  (symptoms, exam, RODS,  strength). Since reducing IVIG after her last visit she does report some treatment related fluctuations, and her TUG time is much slower today. It is difficult to know hoe much this is influenced by her back pain. She appears quite uncomfortable.  We will plan to continue to IVIG every 4 weeks for now. We would like her to follow up with pain management. If her pain improves but gait does not then we may need to increase IVIG back to q 3 weeks. If her pain improves and gait also improves, then we might conclude that gait limitation is primarily pain related - and in that event continue the IVIG optimization process. Plan discussed with her in detail. Will proceed as below.      Plan:      1. IVIG: Continue 1 g/kg q 4 weeks with close clinical surveillance. Additional taper or escalation to be determined pending clinical course.   2. Pain: She will see her pain team back on 21. I encouraged her to continue to follow in the multidisciplinary pain clinic. Appreciate collateral care. Will hope that she can find  some pain improvement, as this will help us better understand the status of her neuropathy.   3. Nutritional: Continue B1 and B12 supplementation. B6 is slightly elevated. Will monitor this as well.   4. Gait and back pain: Continue PT  5. Labs: None today. Will repeat B1, B12, and B6 at next visit.   6. Follow up in 2 months. Sooner if needed.     Víctor Cm MD  Neuromuscular Fellow    Seen and discussed with Dr. Chua. His addendum follows.   ---  ADDENDUM:  I personally interviewed and examined the patient. I agree with the history, physical, assessment, and plan as documented above.     Travon Chua MD

## 2021-03-31 NOTE — PROGRESS NOTES
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  In April 2020 she developed confirmed COVID infection. About 4 weeks later her neurologic symptoms began. Her first symptom was tingling in her hands where it then  progressed to a burning in her hands about 4-5 days after the numbness.  It then quickly progressed to involving bilateral legs below the knee and then her feet. She felt weak in her hands and feet. Fine motor tasks and gait became impaired. She has trouble picking up objects because she has to watch herself  objects. She needed a walker. She also felt that her speech became periodically slurred. The sensory symptoms that included pain and allodynia were most problematic in her hands and feet. NCS in 7/20 showed absent sensory responses in the upper and lower limbs and normal motor responses. In 8/20 IVIG 2 gm/kg loading and then 1 gm/kg q 3 week maintenance was started. Through the fall 2020 she made slow improvements, particular in function and gait. By 11/20 she felt \"50%\" better. In 1/21 she reduced IVIG from 1 gm/kg q 3 weeks to q 4 week.     Interval History:   We last saw her 01/13/21. Since our last visit IVIG frequency was changed from q3 to q4 weeks. By the 3rd week she noted the numbness in her hands and feet is more intense. She also appreciates more difficulty walking by the 3rd weeks as the legs feel \"heavy\". These symptoms then improve after next IVIG. She continues to have numbness up to the mid calf and wrist bilaterally.  Overall no changes in balance and she does not use supportive device for assistance. She does have a walker at home but does not use it. She recently developed back pain which has been limiting for her.  She noted the back pain has made it difficult to walk.  She stated she started lidocaine patches for the back pain but was " not helpful. She continues to take Lyrica 200mg TID, Cymbalta 60mg BID, medical marijuana and uses a TENS unit for pain. She does follow in the Pain clinic. We did repeat her NCS on 1/13/21 which did show unequivocal interval improvement in sensory response amplitudes in the lower > upper limbs.     Prior pertinent laboratory work-up:  5/2020:  ANH 1:160. Vitamin B1 low (45). B12 low/normal (285). Negative/normal double stranded DNA, ANCA, C-reactive, RF, GM1, GD1, Gq1b, vitamin A, B6, Vit E, SSA, SSB undetectable.  6/2020 CSF: 0 WBC, 0 RBC, protein 58 glucose 29.  7/2020: Normal Vitamin B1, B12, folate    8/2020: TS-HDS mildly elevated at 05[address] (N<55757). Negative FGFR3, Immunofixation, paraneoplastic panel,GD1a.  12/20: B1 low (66)  1/2021:  B1 and B6 slightly elevated (B1 = 193, B6 = 182). Normal B12    Prior electrophysiologic work-up:  7/23/20 NCS/EMG showed all absent upper and lower limb SNAPS and all normal upper and lower limb motor responses.   8/3/20 NCS/EMG showed absent right  median, ulnar, and sural sensory studies with radial having attenuated amplitude.   1/13/21: NCS still showed a non length-dependent sensory neuropathy or ganglionopathy, but compared to prior studies performed 8/3/20 and 7/23/20 there has been unequivocal interval improvement in sensory response amplitudes in the lower > upper limbs.      Prior pertinent imaging work-up:  5/20: MRI brain with and without contrast was essentially normal  5/20: MRI C spine with and without contrast showed no abnormal enhancement in the spinal cord, thecal sac or cervical vertebrae. No spinal canal or neural foraminal narrowing.    Past Medical History:   Past Medical History:   Diagnosis Date     Acute kidney injury (H) 05/13/2019     Cardiac arrest (H) 05/13/2019     Earache or other ear, nose, or throat complaint 12/15    tyroid     Pulmonary embolus (H) 05/13/2019     Past Surgical History:  Past Surgical History:   Procedure Laterality Date      GYN SURGERY      2 C-sections     KNEE SURGERY  most recently - 2005-2448    3 surgeries in [place]     LAPAROSCOPIC GASTRIC SLEEVE N/A 3/26/2019    Procedure: Laparoscopic Sleeve Gastrectomy;  Surgeon: Luan Lopez MD;  Location: [facility] OR     ORTHOPEDIC SURGERY      2 knee meniscus surgery     Family history:    There is no known family history of hereditary neuropathies or other neuromuscular disorders.     Social History:    Social History     Tobacco Use     Smoking status: Current Every Day Smoker     Packs/day: 0.30     Years: 1.00     Pack years: 0.30     Types: Cigarettes     Start date: 11/20/2016     Last attempt to quit: 1/12/2018     Years since quitting: 3.2     Smokeless tobacco: Never Used   Substance Use Topics     Alcohol use: Not Currently     Comment: Quit drinking when last hospitalized     Drug use: No     Comment: medical marijuana card.  vapes      Medical Allergies:   No Known Allergies     Current Medications:    Current Outpatient Medications:      Blood Pressure Monitoring (BLOOD PRESSURE MONITOR/ARM) BRAYAN, , Disp: , Rfl:      diphenhydrAMINE (BENADRYL) 50 MG/ML injection, , Disp: , Rfl:      DULoxetine (CYMBALTA) 60 MG capsule, Take 1 capsule (60 mg) by mouth 2 times daily, Disp: 60 capsule, Rfl: 1     folic acid (FOLVITE) 1 MG tablet, Take 1 tablet (1 mg) by mouth daily, Disp: 30 tablet, Rfl: 11     Lidocaine (LIDOCARE) 4 % Patch, Place 1 patch onto the skin every 24 hours To prevent lidocaine toxicity, patient should be patch free for 12 hrs daily., Disp: 10 patch, Rfl: 1     lisinopril (ZESTRIL) 10 MG tablet, Take 1 tablet (10 mg) by mouth daily, Disp: 90 tablet, Rfl: 0     methocarbamol (ROBAXIN) 500 MG tablet, Take 1 tablet (500 mg) by mouth 4 times daily as needed for muscle spasms, Disp: 120 tablet, Rfl: 0     Multiple Vitamins-Minerals (MULTIVITAMIN ADULT) CHEW, Take 1 chew tab by mouth 2 times daily, Disp: 60 tablet, Rfl: 11     NEW MED, MEDICAL CANABIS, Disp: , Rfl:       omeprazole (PRILOSEC) 20 MG DR capsule, Take 1 capsule (20 mg) by mouth daily, Disp: 28 capsule, Rfl: 11     ondansetron (ZOFRAN-ODT) 4 MG ODT tab, Take 1 tablet (4 mg) by mouth every 8 hours as needed for nausea, Disp: 30 tablet, Rfl: 1     polyethylene glycol (MIRALAX) 17 GM/SCOOP powder, Take 17 g (1 capful) by mouth daily, Disp: 850 g, Rfl: 3     potassium chloride ER (KLOR-CON M) 20 MEQ CR tablet, Take 1 tablet (20 mEq) by mouth 2 times daily, Disp: 180 tablet, Rfl: 1     Pregabalin (LYRICA) 200 MG capsule, Take 1 capsule (200 mg) by mouth 3 times daily, Disp: 90 capsule, Rfl: 3     PRIVIGEN 20 GM/200ML SOLN, , Disp: , Rfl:      PRIVIGEN 40 GM/400ML SOLN, , Disp: , Rfl:      QUEtiapine (SEROQUEL) 100 MG tablet, Take 1.5 tablets (150 mg) by mouth At Bedtime If not tolerated, take 100 mg at bedtime., Disp: 45 tablet, Rfl: 1     rivaroxaban ANTICOAGULANT (XARELTO ANTICOAGULANT) 20 MG TABS tablet, Take 1 tablet (20 mg) by mouth daily (with dinner), Disp: 90 tablet, Rfl: 1     vitamin (B COMPLEX) tablet, Take 1 tablet by mouth daily, Disp: 90 tablet, Rfl: 3     vitamin C (ASCORBIC ACID) 1000 MG TABS, Take 1 tablet (1,000 mg) by mouth daily, Disp: 30 tablet, Rfl: 0     vitamin D3 (CHOLECALCIFEROL) 2000 units (50 mcg) tablet, Take 1 tablet (2,000 Units) by mouth daily, Disp: 30 tablet, Rfl: 0    Current Facility-Administered Medications:      cyanocobalamin injection 1,000 mcg, 1,000 mcg, Intramuscular, Q30 Days, Crissy Madrigal PA-C, 1,000 mcg at 11/10/20 1043     medroxyPROGESTERone (DEPO-PROVERA) syringe 150 mg, 150 mg, Intramuscular, Q90 Days, Crissy Madrigal PA-C, 150 mg at 01/04/21 0947    Review of Systems: A complete review of systems was obtained and was negative except for what was noted above.     Physical examination:    BP (!) 135/93   Pulse 107   Resp 16   SpO2 98%     General Appearance: NAD    Skin: There are no rashes or other skin lesions.    Musculoskeletal:  There is no scoliosis,  lordosis, kyphosis, pes cavus, or hammertoes.    Neurologic examination:    Mental status:  Patient is alert, attentive, and oriented x 3.  Language is coherent and fluent without aphasia.  Memory, comprehension and ability to follow commands were intact.       Cranial nerves:   Pupils were round and reacted to light.  Extraocular movements were full. There was no face, jaw, palate or tongue weakness or atrophy. Hearing was grossly intact.  Shoulder shrug was normal.       Motor exam: No atrophy or fasciculations.   Manual muscle testing revealed the following MRC grade muscle power:   Right Left   Neck flexion 5    Neck extension: 5    Shoulder abduction:  5 5   Elbow extension: 5 5   Elbow flexion:  5 5   Wrist flexion:  5 5   Wrist extension:  5 5   FDI 4+ 4+   Hip flexion 5 5   Knee flexion 5 5   Knee extension 5 5   Dorsiflexion 5 5   Plantar flexion 5 5   Green indicates improved compared to last exam  Red indicates worse compared to last exam    Complex motor skills: Mild postural hand tremor. Mild ataxia with FNF.    Sensory exam: Pin reduced below the knee and below the elbow bilaterally  Vibration absent at toe and ankle, 3s at knee, absent at finger, wrist, and 4s at the elbow bilaterally. Proprioception absent at the toe and finger bilaterally.       Gait: Base is narrow base, stable. No ataxia.     Deep tendon reflexes:   Right Left   Triceps 1 1   Biceps 2 2   Brachioradialis 2 2   Knee jerk 0 0   Ankle jerk 0 0     Neuropathy Assessments  Neurology Assessments 3/31/2021 2021 2020 2020 8/3/2020   RODS CIDP/MGUSP Score 33 32 29 16 20   Time for 'Up and Go' test- Seconds: 25.7 (pain limited) 9.79 10.2 15.9 24.09      Strength: 3/31/2021 2021 2020 2020 8/3/2020   Right hand strength in K 25 18 20 16   Left hand strength in K 28 17 22 20     Immunotherapy 3/31/2021 2021 2020 2020 8/3/2020   Time since last IVIG (days): 27 days 1 week 12 5 days -    Current treatment: IVIG 1gm/kg q 4 weeks IVIG 1 gm/kg q 3 weeks IVIG 1gm/kg [id number] IVIG 1 gm/kg q 3 weeks none     Assessment:    Hilaria Bonner is a 30 year old woman with a 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 Noted was a TS-HDS antibody, which is of uncertain significance. The neuropathy is presumed to be immune mediated, but nutritional deficiencies (B1) has been considered and may play a role as well. She has made unequivocal improvement after immunotherapy was started in 8/20 (symptoms, exam, RODS,  strength). Since reducing IVIG after her last visit she does report some treatment related fluctuations, and her TUG time is much slower today. It is difficult to know hoe much this is influenced by her back pain. She appears quite uncomfortable.  We will plan to continue to IVIG every 4 weeks for now. We would like her to follow up with pain management. If her pain improves but gait does not then we may need to increase IVIG back to q 3 weeks. If her pain improves and gait also improves, then we might conclude that gait limitation is primarily pain related - and in that event continue the IVIG optimization process. Plan discussed with her in detail. Will proceed as below.      Plan:      1. IVIG: Continue 1 g/kg q 4 weeks with close clinical surveillance. Additional taper or escalation to be determined pending clinical course.   2. Pain: She will see her pain team back on 4/6/21. I encouraged her to continue to follow in the multidisciplinary pain clinic. Appreciate collateral care. Will hope that she can find some pain improvement, as this will help us better understand the status of her neuropathy.   3. Nutritional: Continue B1 and B12 supplementation. B6 is slightly elevated. Will monitor this as well.   4. Gait and back pain: Continue PT  5. Labs: None today. Will repeat B1, B12, and B6 at next visit.   6. Follow up in 2 months. Sooner if  needed.     Vcítor Cm MD  Neuromuscular Fellow    Seen and discussed with Dr. Chua. His addendum follows.   ---  ADDENDUM:  I personally interviewed and examined the patient. I agree with the history, physical, assessment, and plan as documented above.     Travon Chua MD

## 2021-04-01 ENCOUNTER — HOME INFUSION (PRE-WILLOW HOME INFUSION) (OUTPATIENT)
Dept: PHARMACY | Facility: CLINIC | Age: 31
End: 2021-04-01

## 2021-04-01 ENCOUNTER — OFFICE VISIT (OUTPATIENT)
Dept: FAMILY MEDICINE | Facility: CLINIC | Age: 31
End: 2021-04-01
Payer: COMMERCIAL

## 2021-04-01 ENCOUNTER — ALLIED HEALTH/NURSE VISIT (OUTPATIENT)
Dept: NURSING | Facility: CLINIC | Age: 31
End: 2021-04-01
Payer: COMMERCIAL

## 2021-04-01 ENCOUNTER — APPOINTMENT (OUTPATIENT)
Dept: LAB | Facility: CLINIC | Age: 31
End: 2021-04-01
Attending: PSYCHIATRY & NEUROLOGY
Payer: COMMERCIAL

## 2021-04-01 VITALS
RESPIRATION RATE: 16 BRPM | BODY MASS INDEX: 41.04 KG/M2 | SYSTOLIC BLOOD PRESSURE: 124 MMHG | DIASTOLIC BLOOD PRESSURE: 80 MMHG | WEIGHT: 262 LBS | HEART RATE: 65 BPM | TEMPERATURE: 98.3 F

## 2021-04-01 DIAGNOSIS — S22.070A COMPRESSION FRACTURE OF T9 VERTEBRA, INITIAL ENCOUNTER (H): Primary | ICD-10-CM

## 2021-04-01 DIAGNOSIS — E21.1 SECONDARY HYPERPARATHYROIDISM, NOT ELSEWHERE CLASSIFIED (H): ICD-10-CM

## 2021-04-01 DIAGNOSIS — N92.1 MENORRHAGIA WITH IRREGULAR CYCLE: Primary | ICD-10-CM

## 2021-04-01 PROCEDURE — 96372 THER/PROPH/DIAG INJ SC/IM: CPT

## 2021-04-01 PROCEDURE — 99207 PR DROP WITH A PROCEDURE: CPT | Mod: 25

## 2021-04-01 PROCEDURE — 99214 OFFICE O/P EST MOD 30 MIN: CPT | Performed by: FAMILY MEDICINE

## 2021-04-01 RX ORDER — TRAMADOL HYDROCHLORIDE 50 MG/1
50 TABLET ORAL EVERY 6 HOURS PRN
Qty: 10 TABLET | Refills: 0 | Status: SHIPPED | OUTPATIENT
Start: 2021-04-01 | End: 2021-04-04

## 2021-04-01 RX ORDER — CALCITONIN SALMON 200 [IU]/.09ML
1 SPRAY, METERED NASAL DAILY
Qty: 3.7 ML | Refills: 0 | Status: SHIPPED | OUTPATIENT
Start: 2021-04-01 | End: 2021-04-08 | Stop reason: SINTOL

## 2021-04-01 RX ORDER — QUETIAPINE FUMARATE 25 MG/1
25 TABLET, FILM COATED ORAL DAILY
COMMUNITY
Start: 2021-04-01 | End: 2021-04-22 | Stop reason: DRUGHIGH

## 2021-04-01 RX ADMIN — MEDROXYPROGESTERONE ACETATE 150 MG: 150 INJECTION, SUSPENSION INTRAMUSCULAR at 11:31

## 2021-04-01 RX ADMIN — CYANOCOBALAMIN 1000 MCG: 1000 INJECTION, SOLUTION INTRAMUSCULAR; SUBCUTANEOUS at 11:30

## 2021-04-01 NOTE — PATIENT INSTRUCTIONS
Begin Calcitonin 1 spray one nostril daily.   Begin Tramadol 1 pill every 6 hours as needed for pain in the back.    Continue the Methocarbamol as previous.      Referral information to spine specialist:        Kylah Moseley APRN CNP   [address]   [facility] [address]   Phone: [phone]   Fax: [phone]    Diagnoses: Compression fracture of thoracic vertebra, initial encounter, unspecified thoracic vertebral level (H)   Order: Orthopedic & Spine  Referral       Comment: Please be aware that coverage of these services is subject to the terms and limitations of your health insurance plan.  Call member services at your health plan with any benefit or coverage questions.   Spine: Cervical / Thoracic      The [facility]  will call you to coordinate your care as prescribed by your provider. A representative will call you within 1 business day to help schedule your appointment, or you may contact the  Representative at: [phone]

## 2021-04-01 NOTE — PROGRESS NOTES
BP: Data Unavailable    LAST PAP/EXAM:   Lab Results   Component Value Date    PAP NIL 09/27/2019       The following medication was given:     MEDICATION: Depo Provera 150mg  ROUTE: IM  SITE: Deltoid - Right  : [employer]  LOT #: [id number]  EXP:09/01/2022  NEXT INJECTION DUE: 6/17/21 - 7/1/21   Provider: caroline

## 2021-04-01 NOTE — TELEPHONE ENCOUNTER
RN contacted pt regarding message below. Pt states she did get these results and plan today at an office visit at [facility]. Pt states she is scheduled with the spine specialists as advised and has no additional questions or concerns at this time.  ANGE Rubalcava, RN

## 2021-04-01 NOTE — TELEPHONE ENCOUNTER
Updated script, need to ensure dose is correct at next patient visit. Did historical update for MAR.  RG Raygoza, NP-C  [facility]
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
## 2021-04-01 NOTE — TELEPHONE ENCOUNTER
Called patient, left message to call back.    Missy Meraz RN   [facility], [facility]

## 2021-04-01 NOTE — PROGRESS NOTES
"    Assessment & Plan     Compression fracture of T9 vertebra, initial encounter (H)  Reviewed recent xray with Hilaria.  Comparison with previous xrays indicate the compression fracture noted is new since 11/5/2020.  No known injury but acute symptoms would suggest recent injury causing current symptoms.  Acute treatment with tylenol and lidocaine patches have not been helpful.  Unable to tolerate NSAIDs due to anticoag.  Trial of calcitonin and low dose tramadol.  Short term narcotic only recommended.    - calcitonin, salmon, (MIACALCIN) 200 UNIT/ACT nasal spray; Spray 1 spray into one nostril alternating nostrils daily Alternate nostril each day.  - traMADol (ULTRAM) 50 MG tablet; Take 1 tablet (50 mg) by mouth every 6 hours as needed for severe pain    Secondary hyperparathyroidism, not elsewhere classified (H)  Most recent testing normal PTH and vitamin D.        Review of the result(s) of each unique test - xray, PTH, vitamin D  Prescription drug management         BMI:   Estimated body mass index is 41.04 kg/m  as calculated from the following:    Height as of 1/14/21: 1.702 m (5' 7\").    Weight as of this encounter: 118.8 kg (262 lb).   Weight management plan: Patient was referred to their PCP to discuss a diet and exercise plan.    Patient Instructions     Begin Calcitonin 1 spray one nostril daily.   Begin Tramadol 1 pill every 6 hours as needed for pain in the back.    Continue the Methocarbamol as previous.      Referral information to spine specialist:        Kylah Moseley APRN CNP   [address]   [facility] [address]   Phone: [phone]   Fax: [phone]    Diagnoses: Compression fracture of thoracic vertebra, initial encounter, unspecified thoracic vertebral level (H)   Order: Orthopedic & Spine  Referral       Comment: Please be aware that coverage of these services is subject to the terms and limitations of your health insurance plan.  Call member services at your " health plan with any benefit or coverage questions.   Spine: Cervical / Thoracic      The [facility]  will call you to coordinate your care as prescribed by your provider. A representative will call you within 1 business day to help schedule your appointment, or you may contact the  Representative at: [phone]           Return in about 2 weeks (around 4/15/2021) for as needed for persistent symptoms.    Charley Davis MD  [facility] CHAUNCEY Sepulvedaunda is a 30 year old who presents for the following health issues     HPI     Back Pain  Onset/Duration: ongoing   - started 2 weeks ago with worsening 5 days ago.    Description:   Location of pain: low back bilateral and middle of back bilateral  Character of pain: sharp  Pain radiation: none  New numbness or weakness in legs, not attributed to pain: no   Intensity: severe  Progression of Symptoms: worsening  History:   Specific cause: none but did fall yesterday - no previous fall.  Yesterday walking without walker and fell - not sure why.  Now with leg pain too.    Pain interferes with job: not applicable  History of back problems: no prior back problems  Any previous MRI or X-rays: Yes- at [facility].  Date 3/31/21  Sees a specialist for back pain: No  Alleviating factors:   Improved by: nothing    Precipitating factors:  Worsened by: Lifting, Bending, Standing, Sitting, Lying Flat and Walking  Therapies tried and outcome: lidocaine patches, tylenol extra strength    Accompanying Signs & Symptoms:  Risk of Fracture: None  Risk of Cauda Equina: None  Risk of Infection: None  Risk of Cancer: None  Risk of Ankylosing Spondylitis: Onset at age <35, male, AND morning back stiffness no            Review of Systems   Constitutional, HEENT, cardiovascular, pulmonary, gi and gu systems are negative, except as otherwise noted.      Objective    /80   Pulse 65   Temp 98.3  F (36.8   C) (Tympanic)   Resp 16   Wt 118.8 kg (262 lb)   BMI 41.04 kg/m    Body mass index is 41.04 kg/m .  Physical Exam   GENERAL: alert, no distress and obese  NECK: no adenopathy, no asymmetry, masses, or scars and thyroid normal to palpation  RESP: lungs clear to auscultation - no rales, rhonchi or wheezes  CV: regular rate and rhythm, normal S1 S2, no S3 or S4, no murmur, click or rub, no peripheral edema and peripheral pulses strong  MS: tenderness to palpation over mid thoracic area over spinous processes.  Pain on flexion and extension of upper back.  Movement is slow and exhibiting signs of significant pain.  SKIN: no suspicious lesions or rashes  BACK: no CVA tenderness, no paralumbar tenderness            Xr Thoracic Spine 2 Views    Result Date: 3/31/2021  3 views thoracic spine radiographs 3/31/2021 9:56 AM History: Bilateral thoracic back pain, unspecified chronicity Comparison: CT 11/5/2020 Radiograph 12/30/2019 Findings: Standing  AP, lateral and swimmer views of the thoracic spine were obtained. 12 rib bearing vertebral bodies are identified. Compression deformity of the T9 vertebral body, primarily involving the inferior endplate, with approximately 50% height loss. There also appears to be a milder compression deformity of the T6 vertebral body superior endplate. These appear new from most recent comparison exam CT 11/5/2020. The visualized lungs are clear. Cardiomediastinal silhouette is within normal limits. Numerous surgical clips project over the left upper quadrant.     Impression: Compression deformity of the T9 vertebral body with approximately 50% height loss. Additional milder compression deformity of the T6 vertebral body. These are age indeterminate though new from most recent comparison exam 11/5/2020. [Consider Follow Up: Age-indeterminate thoracic spine compression deformities.] This report will be copied to the [facility] to ensure a provider acknowledges the finding. FRAN  YASH LACY, DO

## 2021-04-01 NOTE — TELEPHONE ENCOUNTER
Recent Results (from the past 744 hour(s))   XR Thoracic Spine 2 Views   Result Value    Radiologist flags Age-indeterminate thoracic spine compression    Narrative    3 views thoracic spine radiographs 3/31/2021 9:56 AM    History: Bilateral thoracic back pain, unspecified chronicity    Comparison: CT 11/5/2020 Radiograph 12/30/2019    Findings:    Standing  AP, lateral and swimmer views of the thoracic spine were  obtained.    12 rib bearing vertebral bodies are identified.    Compression deformity of the T9 vertebral body, primarily involving  the inferior endplate, with approximately 50% height loss. There also  appears to be a milder compression deformity of the T6 vertebral body  superior endplate. These appear new from most recent comparison exam  CT 11/5/2020.    The visualized lungs are clear. Cardiomediastinal silhouette is within  normal limits. Numerous surgical clips project over the left upper  quadrant.      Impression    Impression: Compression deformity of the T9 vertebral body with  approximately 50% height loss. Additional milder compression deformity  of the T6 vertebral body. These are age indeterminate though new from  most recent comparison exam 11/5/2020.    [Consider Follow Up: Age-indeterminate thoracic spine compression  deformities.]    This report will be copied to the [facility] to ensure a  provider acknowledges the finding.       FRAN LACY, DO     Please let patient know looks like she has compression fracture in her upper back   Can she remember and fall she has taken in the last month?  I am going to refer her to a back specialist     Please call [phone] to make appointment  if you do not hear from referrals in the next few days.

## 2021-04-01 NOTE — TELEPHONE ENCOUNTER
Pt in clinic currently she states her psychiatrist prescribes this so disregard fill request but yes she is taking this medication    María Crowe MA

## 2021-04-02 ENCOUNTER — HOME INFUSION (PRE-WILLOW HOME INFUSION) (OUTPATIENT)
Dept: PHARMACY | Facility: CLINIC | Age: 31
End: 2021-04-02

## 2021-04-02 NOTE — PROGRESS NOTES
This is a recent snapshot of the patient's [facility] medical record.  For current drug dose and complete information and questions, call [phone] or In Basket pool, [facility] home infusion ([id number])  CSN Number:  [id number]

## 2021-04-05 NOTE — LETTER
October 24, 2019      Hilaria Bonner  [address]  [address]  [place] [address]        To Whom It May Concern,     Please excuse Hilaria from work until Tuesday October 29 due to an ankle sprain.       Sincerely,        Crissy Madrigal PA-C          
No

## 2021-04-05 NOTE — PROGRESS NOTES
This is a recent snapshot of the patient's [facility] medical record.  For current drug dose and complete information and questions, call [phone] or In Basket pool, [facility] home infusion ([id number])  CSN Number:  [id number]

## 2021-04-06 ENCOUNTER — NURSE TRIAGE (OUTPATIENT)
Dept: FAMILY MEDICINE | Facility: CLINIC | Age: 31
End: 2021-04-06

## 2021-04-06 NOTE — TELEPHONE ENCOUNTER
FYI: Routed to Provider for review, pt has a telephone visit with you for tomorrow. Please refer to triage note.     Patient also wanted you to note that the tramadol was helping.    Carly Waller RN

## 2021-04-06 NOTE — TELEPHONE ENCOUNTER
Pt called and has been experiencing 1 nose bleed per day since starting her new prescription of Calcitonin, salmon (MIACLACIN) 200 Unit/ACT nasal spray. She has also been experiencing lightheadedness during the nose bleeds. The Nose bleeds last for about 5 to 10 minutes and soak through many kleenex. 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 She is currently taking Xeralto 20mg once a day.     RN advised patient to stop taking the nasal spray and to be seen in office today or at Urgent Care. She does not have a ride and has to arrange her rides through medical transportation. She has a Telephone virtual appointment set up with Crissy Madrigal tomorrow at 5pm to discuss this further and for further medication management options. This is the plan that is best suited for her, as there are no office visit appointments available with her choice of provider that she is willing to see.     RN also advised patient that if anything worsens she needs to get in to be seen at an Urgent care or an emergency department, she is agreeable to this.       Additional Information    Negative: Fainted (passed out), or too weak to stand following large blood loss    Negative: Sounds like a life-threatening emergency to the triager    Negative: Nosebleed followed nose injury    Negative: Bleeding present > 30 minutes and using correct method of direct pressure    Negative: Bleeding now and second call after being instructed in correct technique of direct pressure    Negative: Lightheadedness or dizziness    Negative: Pale skin (pallor) of new onset or worsening    Negative: Has nasal packing (inserted by health care provider to control bleeding) and now has new rash    Negative: Has nasal packing and now has bleeding around the packing (Exception: few drops or ooze)    Negative: Patient sounds very sick or weak to the triager    Negative:  "Large amount of blood has been lost (e.g., one cup)    Negative: Bleeding recurs 3 or more times in 24 hours despite direct pressure    Taking Coumadin (warfarin) or other strong blood thinner, or known bleeding disorder (e.g., thrombocytopenia)    Answer Assessment - Initial Assessment Questions  1. AMOUNT OF BLEEDING: \"How bad is the bleeding?\" \"How much blood was lost?\" \"Has the bleeding stopped?\"    - MILD: needed a couple tissues    - MODERATE: needed many tissues    - SEVERE: large blood clots, soaked many tissues, lasted more than 30 minutes       Needed many (moderate)  2. ONSET: \"When did the nosebleed start?\"       Started Saturday or Sunday (can't remember)  3. FREQUENCY: \"How many nosebleeds have you had in the last 24 hours?\"       1 nose bleed a day lasting about 5 to 10 minutes.  4. RECURRENT SYMPTOMS: \"Have there been other recent nosebleeds?\" If so, ask: \"How long did it take you to stop the bleeding?\" \"What worked best?\"       n/a  5. CAUSE: \"What do you think caused this nosebleed?\"      The new nasal spray that I started using that was prescribed to me by Dr. Davis.   6. LOCAL FACTORS: \"Do you have any cold symptoms?\", \"Have you been rubbing or picking at your nose?\"      no  7. SYSTEMIC FACTORS: \"Do you have high blood pressure or any bleeding problems?\"      Yes, I take lisinopril 10mg once daily  8. BLOOD THINNERS: \"Do you take any blood thinners?\" (e.g., coumadin, heparin, aspirin, Plavix)      Yes, xarelto 20mg tablet once daily.  9. OTHER SYMPTOMS: \"Do you have any other symptoms?\" (e.g., lightheadedness)      Lightheadedness.  10. PREGNANCY: \"Is there any chance you are pregnant?\" \"When was your last menstrual period?\"        n/a    Protocols used: JOAQUIN-WENDY Waller RN    "

## 2021-04-07 ENCOUNTER — THERAPY VISIT (OUTPATIENT)
Dept: PHYSICAL THERAPY | Facility: CLINIC | Age: 31
End: 2021-04-07
Attending: NURSE PRACTITIONER
Payer: COMMERCIAL

## 2021-04-07 ENCOUNTER — VIRTUAL VISIT (OUTPATIENT)
Dept: FAMILY MEDICINE | Facility: CLINIC | Age: 31
End: 2021-04-07
Payer: COMMERCIAL

## 2021-04-07 ENCOUNTER — TELEPHONE (OUTPATIENT)
Dept: SURGERY | Facility: CLINIC | Age: 31
End: 2021-04-07

## 2021-04-07 DIAGNOSIS — S22.070D COMPRESSION FRACTURE OF T9 VERTEBRA WITH ROUTINE HEALING, SUBSEQUENT ENCOUNTER: Primary | ICD-10-CM

## 2021-04-07 DIAGNOSIS — E87.6 HYPOKALEMIA: ICD-10-CM

## 2021-04-07 DIAGNOSIS — M54.6 BILATERAL THORACIC BACK PAIN, UNSPECIFIED CHRONICITY: ICD-10-CM

## 2021-04-07 DIAGNOSIS — M54.6 ACUTE THORACIC BACK PAIN: ICD-10-CM

## 2021-04-07 PROCEDURE — 97110 THERAPEUTIC EXERCISES: CPT | Mod: GP | Performed by: PHYSICAL THERAPIST

## 2021-04-07 PROCEDURE — 99213 OFFICE O/P EST LOW 20 MIN: CPT | Mod: 95 | Performed by: PHYSICIAN ASSISTANT

## 2021-04-07 PROCEDURE — 97162 PT EVAL MOD COMPLEX 30 MIN: CPT | Mod: GP | Performed by: PHYSICAL THERAPIST

## 2021-04-07 NOTE — PROGRESS NOTES
Physical Therapy Initial Evaluation  Subjective:  The history is provided by the patient. No  was used.   Patient Health History  Hilaria Bonner being seen for back pain-thoracic.     Problem began: 3/24/2021.   Problem occurred: unknown    Pain is reported as 8/10 (10/10 at its worst) on pain scale.  General health as reported by patient is poor.  Pertinent medical history includes: anemia, high blood pressure, depression, mental illness, numbness/tingling and smoking.   Red flags:  Pain at rest/night and significant weakness.  Medical allergies: none (none).   Surgeries include:  Other (3 knee surgeries, gastroc bypass,).    Current medications:  Anti-depressants, high blood pressure medication, muscle relaxants and sleep medication.    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   Primary job tasks include:  Computer work, driving, lifting/carrying, prolonged standing, pushing/pulling and repetitive tasks.                  Therapist Generated HPI Evaluation  Problem details: Had covid in April and has not been to work since since then. Pt states neuropathy makes it hard to hold walker and pain in feet. Pt states having trouble memorizing things. 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 Pt states that they could have fallen but cannot remember. Has been walking with walker for past couple days. Had a fall previously without walker. Pain 10/10 at worst.         Type of problem:  Thoracic.    This is a new condition.  Condition occurred with:  Insidious onset.  Where condition occurred: at home.  Patient reports pain:  Upper thoracic spine, lower thoracic spine, central thoracic spine, central lumbar spine and other.  Pain is described as sharp and cramping (intermittent cramping/spasms ) and is constant.  Pain radiates to:  Other (pt states feeling stock and glove neuropathy ). Pain is worse in the A.M..  Since onset symptoms are unchanged.  Associated symptoms:  Loss of  "balance, loss of motion/stiffness, loss of motion and loss of strength. Symptoms are exacerbated by certain positions, bending, lifting, sitting and standing  and relieved by nothing (have tried rest, ice, heat, and nothing relieves pain ).  Special tests included:  X-ray (showed malformation of T9).  There was none improvement following previous treatment.  Restrictions due to condition include:  Currently not working due to present treatment.  Barriers include:  Stairs (does step to pattern ).                        Objective:  Hilaria Bonner , : 1990, MRN: [id number]    Physical Therapy Objective Findings  Subjective information, goals, clinical impression, daily documentation and other information found in EPISODES tab.  Objective:     Lumbar Pain    Posture: increased lordosis, forward head, increased pain  Gait:  Antalgic, use of fww, increased UE support on walker, \"step to\" pattern bilaterally, decreased pace, decreased swing phase bilaterally, absent HS/PO   Thoracic/Lumbar Range of Motion:  Flexion                                                 Max loss/Unable due to pain                                                                                                                           Extension Max loss/Unable  due to pain       Right Side Bending Unable to test due to pain       Left Side Bending Unable to test due to pain       Repeated extension- standing Unable to test due to pain       Repeated flexion- standing Unable to test due to pain               Hip Screen:                                                                  Positive                                             Negative                                             Hip ROM Limited bilaterally due to pain     Scour Unable to test due to pain        KELI Unable to test due to pain        FADIR Unable to test due to pain        Other:     Manual Muscle Testing (graded 0-5, measured at 0 degrees unless otherwise " noted):                                                                              Right                                  Left                                                     Transversus Abdominus     -Alan Leg Lowering (deg)     Hip Flex L2 3+ pain with resistance  4- pain with resistance    Hip Abd     Hip Add     Hip Ext     Knee Flex 4-  pain with resistance 4-  pain with resistance   Knee Ext L3 4  pain with resistance 4  pain with resistance   Ankle Dorsiflexion L4     Great Toe Extension L5     Ankle Plantar Flexion S1     Other: unable to complete Straight leg raise bilaterally due to pain,     unable to complete glute bridge at this time , able to do glute sets with min-mod pain     (+ mild pain, ++ moderate pain, +++ severe pain)      Segmental Mobility: pain in T11-L4 palpation -     Palpation: pain with low back palpation back extensors.    All transfers were limited secondary to pain, will continue ongoing evaluation through following sessions.        System    Physical Exam    General     ROS    Assessment/Plan:    Patient is a 30 year old female with cervical complaints.    Patient has the following significant findings with corresponding treatment plan.                Diagnosis 1:  Thoracic back pain(known thoracic spine compression fractures at T6 and T9)  Pain -  manual therapy, self management, education, directional preference exercise and home program  Decreased ROM/flexibility - manual therapy, therapeutic exercise and home program  Decreased joint mobility - manual therapy, therapeutic exercise and home program  Decreased strength - therapeutic exercise, therapeutic activities and home program  Impaired balance - neuro re-education, therapeutic activities and home program  Impaired gait - gait training and home program  Impaired muscle performance - neuro re-education and home program  Decreased function - therapeutic activities and home program  Impaired posture - neuro re-education  and home program  Instability -  Therapeutic Activity  Therapeutic Exercise  Neuromuscular Re-education  home program    Therapy Evaluation Codes:   1) History comprised of:   Personal factors that impact the plan of care:      Anxiety and Living environment.    Comorbidity factors that impact the plan of care are:      Depression, Dizziness, Overweight and Weakness.     Medications impacting care: Anti-depressant, High blood pressure, Muscle relaxant and Sleep.  2) Examination of Body Systems comprised of:   Body structures and functions that impact the plan of care:      Hand, Thoracic Spine and Toes.   Activity limitations that impact the plan of care are:      Bending, Dressing, Grasping, Lifting, Sitting, Stairs, Standing, Working, Sleeping and Laying down.  3) Clinical presentation characteristics are:   Stable/Uncomplicated.  4) Decision-Making    Moderate complexity using standardized patient assessment instrument and/or measureable assessment of functional outcome.  Cumulative Therapy Evaluation is: Moderate complexity.    Previous and current functional limitations:  (See Goal Flow Sheet for this information)    Short term and Long term goals: (See Goal Flow Sheet for this information)     Communication ability:  Patient appears to be able to clearly communicate and understand verbal and written communication and follow directions correctly.  Treatment Explanation - The following has been discussed with the patient:   RX ordered/plan of care  Anticipated outcomes  Possible risks and side effects  This patient would benefit from PT intervention to resume normal activities.   Rehab potential is fair.    Frequency:  1 X week, once daily  Duration:  for 8 weeks  Discharge Plan:  Achieve all LTG.  Independent in home treatment program.  Reach maximal therapeutic benefit.    Please refer to the daily flowsheet for treatment today, total treatment time and time spent performing 1:1 timed codes.     Bandar  Jv SPT, Radha Morgan PT

## 2021-04-07 NOTE — TELEPHONE ENCOUNTER
LVM for patient to schedule telephone return visit with Joanne Sterling around 4/14/2021.    Left call center number for scheduling.

## 2021-04-07 NOTE — PROGRESS NOTES
Hilaria is a 30 year old who is being evaluated via a billable telephone visit.      What phone number would you like to be contacted at? [phone]  How would you like to obtain your AVS? MyChart    Assessment & Plan     Compression fracture of T9 vertebra with routine healing, subsequent encounter  Pain not controlled with ultram- has appointment with neurosurgeon in 2 days and will await their recommendations.  Failed calcitonin nasal spray due to nosebleeds which resolved when discontinued use of calcitonin nasal spray  I am reluctant to prescribe narcotics at this time and she is ok with this since she has been dealing with pain for a couple weeks.  Follow up with pain clinic as soon as possible.    Hypokalemia  kcl tablets are large and difficult to swallow.  Cannot crush - sent in prescription for packet and recheck basic metabolic panel in approximately one month  - potassium chloride (KLOR-CON) 20 MEQ packet  Dispense: 60 packet; Refill: 1  - Basic metabolic panel               Patient Instructions   Schedule nonfasting lab only appointment in one month to recheck potassium level  Keep appointment with neurosurgeon on Friday and schedule appointment with your pain clinic as soon as possible.  Return urgently if any change in symptoms.        No follow-ups on file.    Crissy Madrigal PA-C  [facility]   Hilaria is a 30 year old who presents for the following health issues     HPI     Nose Bleeds  Onset:Sunday  -was prescribed new nasal spray which caused the bleeding  -bleeding would last 1-2 mins  -stopped nasal spray yesterday and has not had a nose bleed today      Would like a decrease on the potassium cause the ones she is taking now are really big pills    Seen by provider last week with compression fracture.  On xarelto indefinitely for massive PE . Started on calcitonin nasal spray and reports nosebleeds.  Unable to  prescription until Sunday (  3 days ago) since they had to order it and next morning was stuffed up and blew nose and mucus mixed with blood.  Had nosebleed later that day as well as next day.  Didn't use nasal spray today and no nose bleed although there was some blood in nasal mucus this AM. No lightheadedness or dizziness.  Went to physical therapy today for back pain.  Was given a few exercises and has appointment with neurosurgeon in 2 days. Complains that she still is in a lot of pain.  Was given a limited supply of tramadol and didn't help much - reports got some relief for 10 minutes to 1/2 hour.  Medical marijuana helps her sleep but doesn't really help with pain and unable to afford.  Doesn't like the high she gets either.  Has tried and failed several medications for pain and has been followed by pain clinic  History of neuropathy related to covid and hasn't seen pain clinic provider since 3/3/21  Has upcoming appointments with pain psychology and psychiatry        Review of Systems   Constitutional, HEENT, cardiovascular, pulmonary, gi and gu systems are negative, except as otherwise noted.      Objective           Vitals:  No vitals were obtained today due to virtual visit.    Physical Exam   healthy, alert and no distress  PSYCH: Alert and oriented times 3; coherent speech, normal   rate and volume, able to articulate logical thoughts, able   to abstract reason, no tangential thoughts, no hallucinations   or delusions  Her affect is normal and pleasant  RESP: No cough, no audible wheezing, able to talk in full sentences  Remainder of exam unable to be completed due to telephone visits                Phone call duration: 18 minutes

## 2021-04-08 RX ORDER — POTASSIUM CHLORIDE 1.5 G/1.58G
20 POWDER, FOR SOLUTION ORAL 2 TIMES DAILY
Qty: 60 PACKET | Refills: 1 | Status: SHIPPED | OUTPATIENT
Start: 2021-04-08 | End: 2021-04-08

## 2021-04-08 RX ORDER — POTASSIUM CHLORIDE 1.5 G/1.58G
20 POWDER, FOR SOLUTION ORAL 2 TIMES DAILY
Qty: 60 PACKET | Refills: 1 | Status: SHIPPED | OUTPATIENT
Start: 2021-04-08 | End: 2021-04-27

## 2021-04-08 NOTE — PATIENT INSTRUCTIONS
Schedule nonfasting lab only appointment in one month to recheck potassium level  Keep appointment with neurosurgeon on Friday and schedule appointment with your pain clinic as soon as possible.  Return urgently if any change in symptoms.

## 2021-04-09 ENCOUNTER — TELEPHONE (OUTPATIENT)
Dept: FAMILY MEDICINE | Facility: CLINIC | Age: 31
End: 2021-04-09

## 2021-04-09 ENCOUNTER — OFFICE VISIT (OUTPATIENT)
Dept: NEUROSURGERY | Facility: CLINIC | Age: 31
End: 2021-04-09
Attending: NURSE PRACTITIONER
Payer: COMMERCIAL

## 2021-04-09 VITALS
WEIGHT: 255.7 LBS | HEART RATE: 99 BPM | OXYGEN SATURATION: 97 % | BODY MASS INDEX: 40.05 KG/M2 | SYSTOLIC BLOOD PRESSURE: 137 MMHG | DIASTOLIC BLOOD PRESSURE: 97 MMHG

## 2021-04-09 DIAGNOSIS — S22.070D COMPRESSION FRACTURE OF T9 VERTEBRA WITH ROUTINE HEALING, SUBSEQUENT ENCOUNTER: Primary | ICD-10-CM

## 2021-04-09 DIAGNOSIS — S22.000A COMPRESSION FRACTURE OF THORACIC VERTEBRA, INITIAL ENCOUNTER, UNSPECIFIED THORACIC VERTEBRAL LEVEL: ICD-10-CM

## 2021-04-09 PROCEDURE — 99203 OFFICE O/P NEW LOW 30 MIN: CPT | Performed by: PHYSICIAN ASSISTANT

## 2021-04-09 RX ORDER — HYDROCODONE BITARTRATE AND ACETAMINOPHEN 5; 325 MG/1; MG/1
1 TABLET ORAL EVERY 6 HOURS PRN
Qty: 10 TABLET | Refills: 0 | Status: SHIPPED | OUTPATIENT
Start: 2021-04-09 | End: 2021-04-12

## 2021-04-09 ASSESSMENT — PAIN SCALES - GENERAL: PAINLEVEL: EXTREME PAIN (8)

## 2021-04-09 NOTE — PROGRESS NOTES
Neurosurgery Consult    HPI    Mr. Bonner is a 30-year-old female who was referred to us for evaluation of a T9 and T6 compression fracture that were recently noted on thoracic x-ray.  They appear to be new since November 2020.  The patient denies any recent falls that she thinks could have preceded these fractures.  She has had no worsening of the thoracic pain and now pain in her lumbar sacral area over the last week.  She denies new pain in her legs but has been having neuropathy ever since having Covid last year.  He had a cervical MRI in May 2020 which was normal.    Medical history  CIDP  Alcohol abuse  Brain injury  PA  Obesity      Social history  Not currently working, used to be employed as a CNA      B/P: 137/97, T: Data Unavailable, P: 99, R: Data Unavailable       Exam    Alert and oriented no acute distress  Bilateral upper extremities with 5/5 strength  Maicol sign negative  Reflexes 2+ triceps, biceps, brachioradialis    Bilateral lower extremities with 5/5 strength  Reflexes 2+ patella/ankle  Negative straight leg raise bilaterally  Negative ankle clonus negative Babinski bilaterally  Gait is normal    Imaging    Thoracic x-ray demonstrates T9 and T6 compression fractures.  These appear new compared to a CT chest abdomen pelvis from November 2020.    Assessment    T6 and T9 compression fractures    Plan:      I recommend the patient obtain thoracic and lumbar MRIs without contrast, we will follow-up with her with the results when they are available.  I have also placed an order for TLSO brace.    Addendum 5/3/2021    Thoracic MRI demonstrates T6, T10 and T9 acute vertebral body compression fractures.  Mild retropulsion of T9 inferior endplate causes mild canal stenosis with no abnormal cord signal.  There are partially visualized sacral S3, S4 and S5 areas of bone marrow edema, these may also represent traumatic fracture.    The patient has since been fitted with a TLSO brace and states this is  helping with her pain.    Upon further discussion with the patient she does state that she recently had several falls, and had fallen in her bathtub around the time that her pain began.  She also reports history of TBI which makes her sometimes forget things.    I recommend she follow-up in neurosurgery clinic in approximately 4 weeks for repeat thoracic x-ray as well as lumbar sacral x-ray.  She should wear her TLSO brace she is up and ambulating a little when she is lying down.  She will contact her pain clinic for management of pain symptoms at this time.    Total time of 30 minutes spent with the patient today in counseling and coordination of care.

## 2021-04-09 NOTE — TELEPHONE ENCOUNTER
Pt is calling and states that She saw a neurosurgery today and the dr told her she needed a MRI and will have this done 4/18 and then told her that he would not give her any pain medication and that is between her and her pcp . So she is asking for some pain medication,and for the dr to call her back  please advise.         Rosaline Iniguez, [facility]  Staff

## 2021-04-09 NOTE — LETTER
4/9/2021         RE: Hilaria Bonner  [address]  [address]  [place] [facility] [place] [address]        Dear Colleague,    Thank you for referring your patient, Hilaria Bonner, to the [facility] [place]. Please see a copy of my visit note below.    Neurosurgery Consult    HPI    Mr. Bonner is a 30-year-old female who was referred to us for evaluation of a T9 and T6 compression fracture that were recently noted on thoracic x-ray.  They appear to be new since November 2020.  The patient denies any recent falls that she thinks could have preceded these fractures.  She has had no worsening of the thoracic pain and now pain in her lumbar sacral area over the last week.  She denies new pain in her legs but has been having neuropathy ever since having Covid last year.  He had a cervical MRI in May 2020 which was normal.    Medical history  CIDP  Alcohol abuse  Brain injury  PA  Obesity      Social history  Not currently working, used to be employed as a CNA      B/P: 137/97, T: Data Unavailable, P: 99, R: Data Unavailable       Exam    Alert and oriented no acute distress  Bilateral upper extremities with 5/5 strength  Maicol sign negative  Reflexes 2+ triceps, biceps, brachioradialis    Bilateral lower extremities with 5/5 strength  Reflexes 2+ patella/ankle  Negative straight leg raise bilaterally  Negative ankle clonus negative Babinski bilaterally  Gait is normal    Imaging    Thoracic x-ray demonstrates T9 and T6 compression fractures.  These appear new compared to a CT chest abdomen pelvis from November 2020.    Assessment    T6 and T9 compression fractures    Plan:      I recommend the patient obtain thoracic and lumbar MRIs without contrast, we will follow-up with her with the results when they are available.  I have also placed an order for TLSO brace.    Total time of 30 minutes spent with the patient today in counseling and coordination of care.      Again, thank you for  allowing me to participate in the care of your patient.        Sincerely,        Thad Vázqeuz PA-C

## 2021-04-09 NOTE — TELEPHONE ENCOUNTER
Reviewed [facility] and last filled 10 tablets tramadol 4/1/21.  Prior to that oxycodone 10 tablets on 2/9/21   Has gotten lyrica as well.  Please call patient and advise I have sent over prescription for 10 tablets of vicodin- this needs to last through the weekend and call pain clinic and set up appointment with them as soon as possible.

## 2021-04-09 NOTE — NURSING NOTE
Hilaria Bonner's goals for this visit include:consult      PCP: Crissy Madrigal    Referring Provider:  Kylah Moseley, APRN CNP  [address] [address]    [email or web address]

## 2021-04-14 ENCOUNTER — OFFICE VISIT (OUTPATIENT)
Dept: PHYSICAL THERAPY | Facility: CLINIC | Age: 31
End: 2021-04-14
Payer: COMMERCIAL

## 2021-04-14 DIAGNOSIS — M54.6 BILATERAL THORACIC BACK PAIN, UNSPECIFIED CHRONICITY: Primary | ICD-10-CM

## 2021-04-14 PROCEDURE — 97110 THERAPEUTIC EXERCISES: CPT | Mod: GP | Performed by: PHYSICAL THERAPIST

## 2021-04-14 PROCEDURE — 97112 NEUROMUSCULAR REEDUCATION: CPT | Mod: GP | Performed by: PHYSICAL THERAPIST

## 2021-04-15 ENCOUNTER — VIRTUAL VISIT (OUTPATIENT)
Dept: PALLIATIVE MEDICINE | Facility: CLINIC | Age: 31
End: 2021-04-15
Payer: COMMERCIAL

## 2021-04-15 DIAGNOSIS — G61.81 CHRONIC INFLAMMATORY DEMYELINATING POLYNEUROPATHY (H): Primary | ICD-10-CM

## 2021-04-15 DIAGNOSIS — R20.2 PARESTHESIAS: ICD-10-CM

## 2021-04-15 DIAGNOSIS — M62.838 MUSCLE SPASM: ICD-10-CM

## 2021-04-15 PROCEDURE — 96158 HLTH BHV IVNTJ INDIV 1ST 30: CPT | Mod: 95 | Performed by: PSYCHOLOGIST

## 2021-04-15 NOTE — PROGRESS NOTES
"Hilaria Bonner is a 30 year old female who is being evaluated via a billable telephone visit.      The patient has been notified of following:     \"This telephone visit will be conducted via a call between you and Jasmin Prince PsyD LP. We have found that certain health care needs can be provided without the need for an in-person session.  This service lets us provide the care you need with a phone conversation.       If during the course of the call I feel a telephone visit is not appropriate, you will not be charged for this service.\"      Reviewed that patient is in a quiet private place, no recording without permission, all apps and notifications of any devices are turned off. Began the session by talking about the risks and benefits of telehealth, what to do if there is a break in the connection, reviewed the safety protocol for during and after sessions. The purpose of using telehealth for this session was due to the COVID-19 pandemic and was to reduce the spread of the disease and protect both the clinician and client.             Patient has given verbal consent for telephone visit? YES    Phone call contact time  Call Started at 102 PM  Call Ended at 142 PM  Total 40 minutes     Pain Diagnoses per pain provider:   Chronic inflammatory demyelinating polyneuropathy (H)      Paresthesias      Muscle spasm        DATA: During today's visit you reported the following: Your pain is unchanged. Your mood is mildly worse - always report feeling worried about 'something'. Your activity level is fairly limited - back has been prohibiting movement, also a fall recently where you 'busted' your lip. Using walker as much as you're able. Your stress level is mildly improved - still experiencing stress, however better able to manage. Your sleep is somewhat in quantity with Seroquel prescription, however, still not able to maintain sleep. You reported engaging in self-care for your pain a couple times daily.    You " identified that you would like to focus on the following or had questions regarding the following issues or concerns, and we discussed the following:   - update on neurology   - missed Rule 25 due to bringing son to ED - encouraged to reschedule  - reviewed factors helping you remain sober - proud of maintaining this on your own  - reviewed self-soothing strategies - preference for auditory and visual; discussed ways to utilize this to manage both mood and pain  - using notebook to journal - this has been helpful  - still not finding TENS unit helpful    ASSESSMENT: Seems to be working diligently with all her providers to maximize her recovery.     PLAN:   Your next appointment is scheduled for 5/6 at 10:00 AM.  Assignment/Objectives /interventions for next session:   - focus on using self-soothing activities that are auditory and visual in nature (e.g., nature sounds, music, looking at pictures of waterfalls or [place] - you could go take photos or a video on a nice day so you'll have this available whenever you need it!, etc)    Telephone-Visit Details    Type of service:  Telephone Visit    Originating Location (pt. Location): Home    Distant Location (provider location):  [facility]     Mode of Communication:  Telephone    I have reviewed the note as documented above.  This accurately captures the substance of my conversation with the patient.    Jasmin Prince PsyD LP  Licensed Psychologist  Outpatient Clinic Therapist  [facility]    Disclaimer: This note consists of symbols derived from keyboarding, dictation and/or voice recognition software. As a result, there may be errors in the script that have gone undetected. Please consider this when interpreting information found in this chart.

## 2021-04-18 ENCOUNTER — ANCILLARY PROCEDURE (OUTPATIENT)
Dept: MRI IMAGING | Facility: CLINIC | Age: 31
End: 2021-04-18
Attending: PHYSICIAN ASSISTANT
Payer: COMMERCIAL

## 2021-04-18 DIAGNOSIS — S22.000A COMPRESSION FRACTURE OF THORACIC VERTEBRA, INITIAL ENCOUNTER, UNSPECIFIED THORACIC VERTEBRAL LEVEL: ICD-10-CM

## 2021-04-18 PROCEDURE — 72146 MRI CHEST SPINE W/O DYE: CPT | Performed by: STUDENT IN AN ORGANIZED HEALTH CARE EDUCATION/TRAINING PROGRAM

## 2021-04-18 PROCEDURE — 72148 MRI LUMBAR SPINE W/O DYE: CPT | Performed by: STUDENT IN AN ORGANIZED HEALTH CARE EDUCATION/TRAINING PROGRAM

## 2021-04-20 DIAGNOSIS — R20.2 PARESTHESIAS: ICD-10-CM

## 2021-04-20 DIAGNOSIS — G61.81 CHRONIC INFLAMMATORY DEMYELINATING POLYNEUROPATHY (H): ICD-10-CM

## 2021-04-20 NOTE — TELEPHONE ENCOUNTER
Received fax request from:     [id number] - [place], [place] - [address] AT [facility]  [address]  [facility] [address]  Phone: [phone] Fax: [phone]    Pharmacy requesting refill(s) for: DULoxetine (CYMBALTA) 60 MG capsule    Last refilled on 03/22/21    Pt last seen on 03/03/21    Next appt scheduled for: No Upcoming    Will facilitate refill.    Wilda Mcdonough [facility] [place]

## 2021-04-21 ENCOUNTER — PATIENT OUTREACH (OUTPATIENT)
Dept: CARE COORDINATION | Facility: CLINIC | Age: 31
End: 2021-04-21

## 2021-04-21 RX ORDER — DULOXETIN HYDROCHLORIDE 60 MG/1
60 CAPSULE, DELAYED RELEASE ORAL DAILY
Qty: 60 CAPSULE | Refills: 0 | Status: SHIPPED | OUTPATIENT
Start: 2021-04-21 | End: 2021-05-06 | Stop reason: DRUGHIGH

## 2021-04-21 NOTE — TELEPHONE ENCOUNTER
Signed Prescriptions:                        Disp   Refills    DULoxetine (CYMBALTA) 60 MG capsule        60 cap*0        Sig: Take 1 capsule (60 mg) by mouth daily  Authorizing Provider: JOSE GIORDANO MD  [facility]

## 2021-04-21 NOTE — PROGRESS NOTES
"Clinic Care Coordination Contact    Follow Up Progress Note      Assessment: Outreach to patient.     Left message and requested return call. RN CC has left several messages and patient has not responded.     Patient continues to be \"long hauler from covid-19.\"     She has been following up with providers as instructed.      Goals addressed this encounter: At baseline  Goals Addressed                 This Visit's Progress      COMPLETED: Improve chronic symptoms (pt-stated)        Goal Statement: I want the burning pain in my hands to improve    Date Goal set: 6/12/20    Barriers: Polyneuropathy    Strengths: Family support    Date to Achieve By: June 2021  Patient expressed understanding of goal: yes    Action steps to achieve this goal:  1. I will continue Lyrica TID  2. I will use Tramadol as instructed for pain  3. I will participate in outpatient therapies (completed therapies 2/2021)        Intervention/Education provided during outreach:     Plan:   Patient will continue to be compliant with ongoing treatment plan.    Care Coordinator will close to clinic care coordination at this time.     Monica Hernandez RN, [facility] RN Coordinator  [facility]   4/21/2021  [phone]  "

## 2021-04-22 ENCOUNTER — VIRTUAL VISIT (OUTPATIENT)
Dept: BEHAVIORAL HEALTH | Facility: CLINIC | Age: 31
End: 2021-04-22
Payer: COMMERCIAL

## 2021-04-22 ENCOUNTER — VIRTUAL VISIT (OUTPATIENT)
Dept: PSYCHIATRY | Facility: CLINIC | Age: 31
End: 2021-04-22
Payer: COMMERCIAL

## 2021-04-22 DIAGNOSIS — F41.1 GAD (GENERALIZED ANXIETY DISORDER): ICD-10-CM

## 2021-04-22 DIAGNOSIS — F09 COGNITIVE AND NEUROBEHAVIORAL DYSFUNCTION FOLLOWING BRAIN INJURY (H): Primary | ICD-10-CM

## 2021-04-22 DIAGNOSIS — F29 PSYCHOSIS, UNSPECIFIED PSYCHOSIS TYPE (H): ICD-10-CM

## 2021-04-22 DIAGNOSIS — G31.89 COGNITIVE AND NEUROBEHAVIORAL DYSFUNCTION FOLLOWING BRAIN INJURY (H): Primary | ICD-10-CM

## 2021-04-22 DIAGNOSIS — F41.1 GENERALIZED ANXIETY DISORDER: ICD-10-CM

## 2021-04-22 DIAGNOSIS — F32.1 MODERATE MAJOR DEPRESSION (H): Primary | ICD-10-CM

## 2021-04-22 DIAGNOSIS — S06.9XAS COGNITIVE AND NEUROBEHAVIORAL DYSFUNCTION FOLLOWING BRAIN INJURY (H): Primary | ICD-10-CM

## 2021-04-22 DIAGNOSIS — F32.1 MODERATE MAJOR DEPRESSION (H): ICD-10-CM

## 2021-04-22 PROCEDURE — 90832 PSYTX W PT 30 MINUTES: CPT | Mod: 95 | Performed by: SOCIAL WORKER

## 2021-04-22 PROCEDURE — 99214 OFFICE O/P EST MOD 30 MIN: CPT | Mod: 95 | Performed by: PSYCHIATRY & NEUROLOGY

## 2021-04-22 RX ORDER — QUETIAPINE FUMARATE 300 MG/1
300 TABLET, FILM COATED ORAL AT BEDTIME
Qty: 30 TABLET | Refills: 1 | Status: SHIPPED | OUTPATIENT
Start: 2021-04-22 | End: 2021-06-07

## 2021-04-22 RX ORDER — PRAZOSIN HYDROCHLORIDE 1 MG/1
1 CAPSULE ORAL AT BEDTIME
Qty: 30 CAPSULE | Refills: 1 | Status: SHIPPED | OUTPATIENT
Start: 2021-04-22 | End: 2021-06-07

## 2021-04-22 ASSESSMENT — ANXIETY QUESTIONNAIRES
7. FEELING AFRAID AS IF SOMETHING AWFUL MIGHT HAPPEN: NEARLY EVERY DAY
2. NOT BEING ABLE TO STOP OR CONTROL WORRYING: NEARLY EVERY DAY
1. FEELING NERVOUS, ANXIOUS, OR ON EDGE: NEARLY EVERY DAY
3. WORRYING TOO MUCH ABOUT DIFFERENT THINGS: NEARLY EVERY DAY
6. BECOMING EASILY ANNOYED OR IRRITABLE: NEARLY EVERY DAY
5. BEING SO RESTLESS THAT IT IS HARD TO SIT STILL: NOT AT ALL
IF YOU CHECKED OFF ANY PROBLEMS ON THIS QUESTIONNAIRE, HOW DIFFICULT HAVE THESE PROBLEMS MADE IT FOR YOU TO DO YOUR WORK, TAKE CARE OF THINGS AT HOME, OR GET ALONG WITH OTHER PEOPLE: EXTREMELY DIFFICULT
GAD7 TOTAL SCORE: 18

## 2021-04-22 ASSESSMENT — PATIENT HEALTH QUESTIONNAIRE - PHQ9
SUM OF ALL RESPONSES TO PHQ QUESTIONS 1-9: 17
5. POOR APPETITE OR OVEREATING: NEARLY EVERY DAY

## 2021-04-22 NOTE — PATIENT INSTRUCTIONS
Increase quetiapine to 300 mg at bedtime.    Start prazosin 1 mg at bedtime for nightmares.     Continue all other medications per your primary care provider.    Expect a call regarding scheduling an appointment with neuropsychiatry.    Schedule an appointment with me in 4 weeks or sooner as needed.  You may call [facility] at [phone] to schedule.    [place] Resources:      Go to the Emergency Department as needed or call after hours crisis line at [phone].      To schedule individual or family therapy, call [facility] at [phone].     Follow up with primary care provider as planned or sooner for acute medical concerns.    Call the psychiatric nurse line with medication questions or concerns at [phone].    Teepixt may be used to communicate with your provider, but this is not intended to be used for emergencies.    Community Resources:      National Suicide Prevention Lifeline: 249.748.3852 (TTY: 559.135.6691). Call anytime for help.  (www.suicidepreventionlifeline.org)    National [place] on [place]ness (www.joe.org): 138.505.1243 or 461-419-1735.     Mental Health Association (www.mentalhealth.org): 717.699.6511 or 544-422-0112.    [place] Crisis Text Line: Text [place] to 701914    Suicide LifeLine Chat: suicidepreventionlifeline.org/chat

## 2021-04-22 NOTE — PROGRESS NOTES
Collaborative Care Psychiatry Service (CCPS)  April 22, 2021    Behavioral Health Clinician Progress Note    Patient Name: Hilaria Bonner      Telemedicine Visit: The patient's condition can be safely assessed and treated via synchronous audio and visual telemedicine encounter.      Reason for Telemedicine Visit: Services only offered telehealth    Originating Site (Patient Location): Patient's home    Distant Site (Provider Location): Provider Remote Setting    Consent:  The patient/guardian has verbally consented to: the potential risks and benefits of telemedicine (video visit) versus in person care; bill my insurance or make self-payment for services provided; and responsibility for payment of non-covered services.     Mode of Communication:  Video Conference via Content Syndicate: Words on Demand    As the provider I attest to compliance with applicable laws and regulations related to telemedicine.         Service Type:  Individual      Service Location:   Phone call (patient / identified key support person reached)     Session Start Time: 2:48p  Session End Time: 3:05      Session Length: 16 - 37      Attendees: Client    Visit Activities (Refresh list every visit): [facility] Only    Diagnostic Assessment Date: 3/22/21  See Flowsheets for today's PHQ-9 and MELODY-7 results  Previous PHQ-9:   PHQ-9 SCORE 12/15/2020 3/22/2021 4/22/2021   PHQ-9 Total Score MyChart - - -   PHQ-9 Total Score 21 22 17   PHQ-A Total Score - - -       Previous MELODY-7:   MELODY-7 SCORE 12/15/2020 3/22/2021 4/22/2021   Total Score - - -   Total Score 8 14 18       WHODAS  WHODAS 2.0 Total Score 3/22/2021   Total Score 40        AUDIT  No flowsheet data found.    DATA  Extended Session (60+ minutes): No  Interactive Complexity: No  Crisis: No    Medication Compliance:  Yes only took the increased seroquel 2x and then stopped as it didn't help      Chemical Use Review:   Substance Use: Chemical use reviewed, no active concerns identified -pt reports that she has  "continued to refrain from alcohol use since last session.     Tobacco Use: No change in amount of tobacco use since last session.  Patient declined discussion at this time    Current Stressors / Issues:  Pt reports that she has \"been having trouble\".  Pt shares that she has been having troubles sleeping, concentrating, comprehending, worrying, and feeling depressed. Pt shares that her symptoms don't seem to be getting better.    Pt shares that she has a PCA available 7 days a week for 4 hours a day.  PCA helps with cooking, laundry, showering.  Pt shares that she took the increased seroquel 2x but continued to have night sweats and nightmares which wakes her often.    Pt reports that her children are very supportive.     Does the client have any condition that is currently presenting as a potential to harm themselves or others (severe withdrawal, serious medical condition, severe emotional/behavioral problem)? No.  Proceed with assessment.      Review of Symptoms per patient report:  Depression: Lack of interest, Change in energy level, Difficulties concentrating and Feeling sad, down, or depressed  Candice:  No Symptoms  Psychosis: reports vivid nightmares  Anxiety: Excessive worry and Social anxiety  Panic:  Palpitations and Shortness of breath  Post Traumatic Stress Disorder:  Nightmares   Eating Disorder: No Symptoms  ADD / ADHD:  No symptoms  Conduct Disorder: No symptoms  Autism Spectrum Disorder: No symptoms  Obsessive Compulsive Disorder: No Symptoms      Changes in Health Issues:   None reported-Continues with health issues and concerns following a heart attack 2 years ago and since having COVID last year.    Assessment: Current Emotional / Mental Status (status of significant symptoms):  Risk status (Self / Other harm or suicidal ideation)  Patient denies a history of suicidal ideation, suicide attempts, self-injurious behavior, homicidal ideation, homicidal behavior and and other safety concerns  Patient " denies current fears or concerns for personal safety.  Patient denies current or recent suicidal ideation or behaviors.  Patient denies current or recent homicidal ideation or behaviors.  Patient denies current or recent self injurious behavior or ideation.  Patient denies other safety concerns.  A safety and risk management plan has not been developed at this time, however patient was encouraged to call [place] [phone] should there be a change in any of these risk factors.    Appearance:   phone visit   Eye Contact:   phone visit   Psychomotor Behavior: phone visit   Attitude:   Cooperative   Orientation:   All  Speech   Rate / Production: Normal    Volume:  Normal   Mood:    Normal  Affect:    Appropriate   Thought Content:  Clear   Thought Form:  Coherent  Logical   Insight:    Good     Diagnoses:  1. Moderate major depression (H)    2. Generalized anxiety disorder        Collateral Reports Completed:  Not Applicable    Plan: (Homework, other):  Patient was given information about behavioral services and encouraged to schedule a follow up appointment with the clinic [facility].  She was also given information about mental health symptoms and treatment options .  CD Recommendations: pt declines any CD resources at this time.  Has been sober for over 1 month and doesn't feel formal programming is needed at this time. .  Dana Harrison, JESSICA, [facility]      Dana Harrison  April 22, 2021

## 2021-04-22 NOTE — PROGRESS NOTES
"Hilaria is a 30 year old who is being evaluated via a billable telephone visit.      What phone number would you like to be contacted at? [phone]  How would you like to obtain your AVS? Ferdinand        Outpatient Psychiatric Progress Note    Name: Hilaria Bonner   : 1990                    Primary Care Provider: Crissy Madrigal PA-C   Therapist: Referred to [facility]     PHQ-9 scores:  PHQ-9 SCORE 12/15/2020 3/22/2021 2021   PHQ-9 Total Score MyChart - - -   PHQ-9 Total Score 21 22 17   PHQ-A Total Score - - -       MELODY-7 scores:  MELODY-7 SCORE 12/15/2020 3/22/2021 2021   Total Score - - -   Total Score 8 14 18       Patient Identification:  Hilaria is a 30 year old year old female  who presents for return visit with me.  Patient attended the session. Her nursing assistant will be there.     Interim History:  Per DA by Dana Harrison [facility]:  Pt reports that she has \"been having trouble\".  Pt shares that she has been having troubles sleeping, concentrating, comprehending, worrying, and feeling depressed. Pt shares that her symptoms don't seem to be getting better.    Pt shares that she has a PCA available 7 days a week for 4 hours a day.  PCA helps with cooking, laundry, showering.  Pt shares that she took the increased seroquel 2x but continued to have night sweats and nightmares which wakes her often.    Pt reports that her children are very supportive.    Depression:     Lack of interest, Change in energy level, Difficulties concentrating and Feeling sad, down, or depressed  Psychosis:       reports vivid nightmares  Anxiety:           Excessive worry and Social anxiety  Panic:              Palpitations and Shortness of breath  Post Traumatic Stress Disorder:  Nightmares     Hilaria has increased her quetiapine on her own to 200 mg at bedtime, but she is still having auditory hallucinations that prevented her from sleeping.  She also wakes up frequently with very vivid distressing " dreams.  We agreed to increase her quetiapine to 300 mg at bedtime and will also start prazosin 1 mg.    She continues to have a lot of depressive symptoms, and is on the highest dose of duloxetine.  Her pain clinic and primary care providers were contacted and are open to changing her antidepressant to help with her mood and anxiety.  We agreed to just make medication changes as above rather than also changing her duloxetine.    Hilaria has a lot of medical complications due to who her pulmonary embolism, prolonged time needing resuscitation, and aftereffects of Covid.  I will refer her to Dr. Torres, neuropsychiatrist.  I will continue to follow her until she can be seen by him.    Vital Signs:   There were no vitals taken for this visit.    Current Medications:  Current Outpatient Medications   Medication     Blood Pressure Monitoring (BLOOD PRESSURE MONITOR/ARM) BRAYAN     lisinopril (ZESTRIL) 10 MG tablet     methocarbamol (ROBAXIN) 500 MG tablet     Multiple Vitamins-Minerals (MULTIVITAMIN ADULT) CHEW     NEW MED     omeprazole (PRILOSEC) 20 MG DR capsule     potassium chloride (KLOR-CON) 20 MEQ packet     prazosin (MINIPRESS) 1 MG capsule     Pregabalin (LYRICA) 200 MG capsule     PRIVIGEN 40 GM/400ML SOLN     QUEtiapine (SEROQUEL) 300 MG tablet     rivaroxaban ANTICOAGULANT (XARELTO ANTICOAGULANT) 20 MG TABS tablet     vitamin (B COMPLEX) tablet     vitamin C (ASCORBIC ACID) 1000 MG TABS     vitamin D3 (CHOLECALCIFEROL) 2000 units (50 mcg) tablet     diphenhydrAMINE (BENADRYL) 50 MG/ML injection     DULoxetine (CYMBALTA) 60 MG capsule     folic acid (FOLVITE) 1 MG tablet     ondansetron (ZOFRAN-ODT) 4 MG ODT tab     polyethylene glycol (MIRALAX) 17 GM/SCOOP powder     PRIVIGEN 20 GM/200ML SOLN     Current Facility-Administered Medications   Medication     cyanocobalamin injection 1,000 mcg     medroxyPROGESTERone (DEPO-PROVERA) syringe 150 mg        Mental Status Examination:  Hilaria is a 30-year-old  woman in no acute distress.  Speech is clear but somewhat pressured.  Mood is depressed and anxious.  Thoughts show some perseveration and flight of ideas.  Thought content shows auditory hallucinations.  No suicidal thoughts.  She is alert and oriented x3.    Assessment and Plan:    ICD-10-CM    1. 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 Psychosis, unspecified psychosis type (H)  F29 QUEtiapine (SEROQUEL) 300 MG tablet     prazosin (MINIPRESS) 1 MG capsule   3. Moderate major depression (H)  F32.1    4. MELODY (generalized anxiety disorder)  F41.1        Medical comorbidities include:   Patient Active Problem List   Diagnosis     Vitamin D deficiency     Elevated parathyroid hormone     Encounter for smoking cessation counseling     Menorrhagia with irregular cycle     History of morbid obesity     Pulmonary embolism with infarction (H)     Cardiac arrest, cause unspecified (H)     VT (ventricular tachycardia) (H)     Other pulmonary embolism with acute cor pulmonale, unspecified chronicity (H)     Secondary hyperparathyroidism, not elsewhere classified (H)     Paresthesias     Hemorrhoids, unspecified hemorrhoid type     Anxiety     Polyneuropathy     Altered mental status     CIDP (chronic inflammatory demyelinating polyneuropathy) (H)     S/P laparoscopic sleeve gastrectomy     Class 2 severe obesity due to excess calories with serious comorbidity and body mass index (BMI) of 39.0 to 39.9 in adult (H)     Moderate major depression (H)     Alcohol abuse     Alcohol-induced acute pancreatitis without infection or necrosis     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    Start prazosin 1 mg at bedtime for nightmares.     Continue  all other medications per your primary care provider.    Expect a call regarding scheduling an appointment with neuropsychiatry.    Schedule an appointment with me in 4 weeks or sooner as needed.  You may call  [facility] at [phone] to schedule.    [place] Resources:      Go to the Emergency Department as needed or call after hours crisis line at [phone].      To schedule individual or family therapy, call [facility] at [phone].     Follow up with primary care provider as planned or sooner for acute medical concerns.    Call the psychiatric nurse line with medication questions or concerns at [phone].    Cosentialhart may be used to communicate with your provider, but this is not intended to be used for emergencies.    Community Resources:      National Suicide Prevention Lifeline: 630.106.1782 (TTY: 727.107.7545). Call anytime for help.  (www.suicidepreventionlifeline.org)    National [place] on Mental Illness (www.joe.org): 165.678.7813 or 859-320-5626.     Mental Health Association (www.mentalhealth.org): 955.225.8548 or 397-056-7258.    [place] Crisis Text Line: Text [place] to 574966    Suicide LifeLine Chat: suicidepreModern Feedline.org/chat       Administrative Billing:   Phone call duration: 24 minutes  Total time spent including chart review and documentation: 34 minutes    Patient Status:  The patient is being referred to long term community psychiatry care and provider will provide bridging until patient is established with new community provider.

## 2021-04-23 ASSESSMENT — ANXIETY QUESTIONNAIRES: GAD7 TOTAL SCORE: 18

## 2021-04-26 DIAGNOSIS — E87.6 HYPOKALEMIA: ICD-10-CM

## 2021-04-27 RX ORDER — POTASSIUM CHLORIDE 1.5 G/1.58G
20 POWDER, FOR SOLUTION ORAL 2 TIMES DAILY
Qty: 60 PACKET | Refills: 0 | Status: SHIPPED | OUTPATIENT
Start: 2021-04-27 | End: 2021-06-02

## 2021-04-28 ENCOUNTER — THERAPY VISIT (OUTPATIENT)
Dept: PHYSICAL THERAPY | Facility: CLINIC | Age: 31
End: 2021-04-28
Payer: COMMERCIAL

## 2021-04-28 ENCOUNTER — HOME INFUSION (PRE-WILLOW HOME INFUSION) (OUTPATIENT)
Dept: PHARMACY | Facility: CLINIC | Age: 31
End: 2021-04-28

## 2021-04-28 DIAGNOSIS — M54.6 BILATERAL THORACIC BACK PAIN, UNSPECIFIED CHRONICITY: Primary | ICD-10-CM

## 2021-04-28 PROCEDURE — 97110 THERAPEUTIC EXERCISES: CPT | Mod: GP | Performed by: PHYSICAL THERAPIST

## 2021-04-28 PROCEDURE — 97112 NEUROMUSCULAR REEDUCATION: CPT | Mod: GP | Performed by: PHYSICAL THERAPIST

## 2021-04-30 ENCOUNTER — HOME INFUSION (PRE-WILLOW HOME INFUSION) (OUTPATIENT)
Dept: PHARMACY | Facility: CLINIC | Age: 31
End: 2021-04-30

## 2021-04-30 NOTE — PROGRESS NOTES
This is a recent snapshot of the patient's [facility] medical record.  For current drug dose and complete information and questions, call [phone] or In Basket pool, [facility] ([id number])  CSN Number:  [id number]

## 2021-05-04 ENCOUNTER — TELEPHONE (OUTPATIENT)
Dept: NEUROSURGERY | Facility: CLINIC | Age: 31
End: 2021-05-04

## 2021-05-04 NOTE — PROGRESS NOTES
Hilaria is a 30 year old who is being evaluated via a billable telephone visit.      What phone number would you like to be contacted at? [phone]  How would you like to obtain your AVS? Ferdinand Novoa CMA ([facility]     Date of visit: 5/6/2021      Assessment:   Hilaria Bonner is a 30 year old female with a past medical history significant for PE with associated PEA arrest, morbid obesity s/p sleeve gastrectomy, pancreatitis, and recent COVID 19 who presents with complaints of upper and lower extremity pain.      1. Upper and lower extremity pain- etiology may be an underlying autoimmune condition triggered by COVID polyneuritis, undergoing evaluation and treatment with neurology.  2. Alcohol dependence: Has referral to see addiction medicine.    3. Mental Health - the patient's mental health concerns, specifically anxiety, affect her experience of pain and contribute to her clinically significant distress.    Visit Diagnoses:  1. Chronic inflammatory demyelinating polyneuropathy (H)        Plan:     1.  Pain Physical Therapy:    Continue rehab physical therapy as planned- advised she focus on her back as this sounds like it has been a major issue.    2.  Pain Psychologist to address relaxation, behavioral change, coping style, and other factors important to improvement.     YES  Continue regular sessions with Jasmin Prince PsyD, LP as planned.    3.  Medication Management:     1. Hilaria reports minimal pain benefit with current medication regimen. We will try to eliminate medications that are not helping. Decrease Cymbalta to 90mg daily. If no increase in pain we will continue to decrease until off. Advised she call right away if she thinks pain is worsening.    2. Hilaria inquires about medication management of acute back pain. States neurosurgery advised she contact me for recommendations. I have not seen her to discuss this complaint but a different  muscle relaxant may be helpful. Discontinue Robaxin. Try tizanidine 2-4mg TID prn for muscle spasm/back pain. Caution- this can be sedating and slow down your bowels.     3 . Advised she reach out to dispensary to adjust medical cannabis to only yellow (or 50-50 products) as these seem to help some without side effects.    4.  Potential procedures: not at this time.     5.  Referrals: advised she continue follow up with Dr. Chua with Neurology. Go to follow up appointment with Thad POWERS .   6.  Follow up with RG Puri CNP in 6-8 weeks. We may only have another 1-2 visits if our progress continues to be limited.    Janki DIEZ CNP  [facility]     -------------------------------------------------    Subjective:    Chief complaint:   Chief Complaint   Patient presents with     Pain     Telephone visit due to COVID-19       Interval history:  Hilaria Bonner is a 30 year old female last seen on 3/3/2021.  She is seen in follow up.     Recommendations/plan at the last visit included:              1.  Therapy: Continue HEP daily. Briefly discussed chronic pain PT but at this point she feels like she is doing well with her exercises at home.                2.  Pain Psychologist to address relaxation, behavioral change, coping style, and other factors important to improvement. Continue visits with Jasmin Prince PsyD, LP.              3.  Medication Management:                           1. Continue methocarbamol 500 mg QID prn. If tolerating well may increase to 1,000 mg QID prn.                          2. Unclear of degree of benefit from Cymbalta. Advised she continue Cymbalta 120mg daily for now.                          3. Continue Lyrica 200mg TID- max dose.                           4. Continue medical cannabis as able/finances allow.               4.  Potential procedures: not at this time.                5.  Referrals: None              6.  Continue utilizing the  "TENS unit.               7.  Follow up with RG Mccoy CNP in 8 weeks.        Since her last visit, Hilaria Bonner reports:  -Her pain is about the same and somewhat worse than it was at last visit.  -She states since first being referred to the pain clinic she has had little to no progress in pain management.    -She reports she has been struggling with mid back pain. She has been wearing a TLSO brace for management.   -She has continued sessions with Jasmin Prince PsyD, LP on a semi-regular basis. She isn't sure how helpful this has been.   -She continues Cymbalta 60mg BID without benefit.  -She continues taking Robaxin 500-1000mg TID prn but has not noticed a difference in extremity or back pain.   -She continues to follow with Neurology- Dr. hCua- and have IVIG infusions on a regular basis. She isn't sure how much this has been helpful but she does note with decreasing frequency of these infusions her pain has been worse. These infusions may be discontinued soon.   -She uses medical cannabis without pain benefit but does help her sleep. She primarily uses a THC dominant pill and vapor, likes the 50-50 product \"it mellows me out.\"   -She continues using her TENS unit with limited benefit.     Current pain medications:               Lyrica 200mg TID- [facility], \"a little bit\"               Cymbalta 120mg daily- ?              Hydroxyzine 25-50mg prn- H for anxiety, ran out of              Folate 1mg daily                       Medical cannabis (starter kit- CBD dominant capsules/vapor, CBD/THC capsules/pills, and THC dominant capsules/vapor) - H with sleep but not pain              Methocarbamol 500 mg QID prn - [facility] for spasms              Seroquel 300mg at bedtime- [facility]   Prazosin 1mg at bedtime- SW, sleeping better      Current MME: 0    Review of [place] Prescription Monitoring Program (): No concern for abuse or misuse of controlled medications based on this report.     Annual Controlled " "Substance Agreement/UDS due date: NA    Past pain treatments:  1. Previous Pain Relevant Medications:              Opiates: Tramadol- [facility], oxycodone- ?/[facility]              NSAIDS: doesn't take anti-inflammatories due to gastric bypass              Muscle Relaxants: tizanidine- H for sleep only, Robaxin- NH              Anti-migraine mediations: no              Anti-depressants: amitriptyline (up to 75mg)- ?, \"it put me to sleep\", Cymbalta- ?, Wellbutrin- [place]              Sleep aids: no              Anxiolytics: hydroxyzine- H               Neuropathics: Lyrica- H, gabapentin (started on for numbness in toes and migraines after cardiac arrest)- , fatigue, Topamax- H for weight loss, NH for pain              Topicals: gabapentin cream- NH/SW, lidocaine cream- NH, W, burning, capsaicin cream- NH, W, burning              Other medications not covered above: Tylenol- NH     2. Physical Therapy: going to neuro PT and OT   3. Pain Psychology: yes Jasmin Prince PsyD - [facility]  4. Surgery: gastric bypass March 2019  5. Injections: no  6. Chiropractic: no  7. Acupuncture: yes x4 sessions with José Kunz DC - [place]  8. TENS Unit: yes- [place], still uses on a regular basis.     Medications:  Current Outpatient Medications   Medication Sig Dispense Refill     DULoxetine (CYMBALTA) 30 MG capsule Take 1 capsule (30 mg) by mouth daily 30 capsule 1     DULoxetine (CYMBALTA) 60 MG capsule Take 1 capsule (60 mg) by mouth daily 30 capsule 1     folic acid (FOLVITE) 1 MG tablet Take 1 tablet (1 mg) by mouth daily 30 tablet 11     lisinopril (ZESTRIL) 10 MG tablet Take 1 tablet (10 mg) by mouth daily 90 tablet 0     methocarbamol (ROBAXIN) 500 MG tablet Take 1 tablet (500 mg) by mouth 4 times daily as needed for muscle spasms 120 tablet 0     Multiple Vitamins-Minerals (MULTIVITAMIN ADULT) CHEW Take 1 chew tab by mouth 2 times daily 60 tablet 11     [facility] CANABIS       omeprazole (PRILOSEC) 20 MG DR capsule Take 1 capsule (20 mg) " by mouth daily 28 capsule 11     ondansetron (ZOFRAN-ODT) 4 MG ODT tab Take 1 tablet (4 mg) by mouth every 8 hours as needed for nausea 30 tablet 1     polyethylene glycol (MIRALAX) 17 GM/SCOOP powder Take 17 g (1 capful) by mouth daily 850 g 3     potassium chloride (KLOR-CON) 20 MEQ packet Take 20 mEq by mouth 2 times daily 60 packet 0     prazosin (MINIPRESS) 1 MG capsule Take 1 capsule (1 mg) by mouth At Bedtime 30 capsule 1     Pregabalin (LYRICA) 200 MG capsule Take 1 capsule (200 mg) by mouth 3 times daily 90 capsule 3     PRIVIGEN 20 GM/200ML SOLN        PRIVIGEN 40 GM/400ML SOLN        QUEtiapine (SEROQUEL) 300 MG tablet Take 1 tablet (300 mg) by mouth At Bedtime 30 tablet 1     rivaroxaban ANTICOAGULANT (XARELTO ANTICOAGULANT) 20 MG TABS tablet Take 1 tablet (20 mg) by mouth daily (with dinner) 90 tablet 1     vitamin (B COMPLEX) tablet Take 1 tablet by mouth daily 90 tablet 3     vitamin C (ASCORBIC ACID) 1000 MG TABS Take 1 tablet (1,000 mg) by mouth daily 30 tablet 0     vitamin D3 (CHOLECALCIFEROL) 2000 units (50 mcg) tablet Take 1 tablet (2,000 Units) by mouth daily 30 tablet 0     Blood Pressure Monitoring (BLOOD PRESSURE MONITOR/ARM) BRAYAN        diphenhydrAMINE (BENADRYL) 50 MG/ML injection          Medical History: any changes in medical history since they were last seen? No    Review of Systems: A 10-point review of systems was negative, with the exception of chronic pain issues and numbness and tingling.     Objective:    Extreme Pain (8)    Phone call duration: 28 minutes    BILLING TIME DOCUMENTATION:   The total TIME spent on this patient on the date of the encounter/appointment was 41 minutes.      TOTAL TIME includes:   Time spent preparing to see the patient (reviewing records and tests)   Time spent face to face (or over the phone) with the patient   Time spent ordering tests, medications, procedures and referrals   Time spent Referring and communicating with other healthcare professionals    Time spent documenting clinical information in Epic

## 2021-05-04 NOTE — TELEPHONE ENCOUNTER
Reviewed results with patient. Patient agreeable with plan.     ----- Message from Thad Vázquez PA-C sent at 5/3/2021  1:36 PM CDT -----  Regarding: Follow-up plan  I recommend she follow-up in neurosurgery clinic in approximately 4 weeks for repeat thoracic x-ray as well as lumbar sacral x-ray.  She should wear her TLSO brace she is up and ambulating a little when she is lying down.  She will contact her pain clinic for management of pain symptoms at this time.    Appointment with me in clinic.  Thank you

## 2021-05-05 ENCOUNTER — THERAPY VISIT (OUTPATIENT)
Dept: PHYSICAL THERAPY | Facility: CLINIC | Age: 31
End: 2021-05-05
Payer: COMMERCIAL

## 2021-05-05 DIAGNOSIS — M54.6 BILATERAL THORACIC BACK PAIN, UNSPECIFIED CHRONICITY: Primary | ICD-10-CM

## 2021-05-05 DIAGNOSIS — E87.6 HYPOKALEMIA: ICD-10-CM

## 2021-05-05 PROCEDURE — 97530 THERAPEUTIC ACTIVITIES: CPT | Mod: GP | Performed by: PHYSICAL THERAPIST

## 2021-05-05 PROCEDURE — 97110 THERAPEUTIC EXERCISES: CPT | Mod: GP | Performed by: PHYSICAL THERAPIST

## 2021-05-05 RX ORDER — POTASSIUM CHLORIDE 1500 MG/1
20 TABLET, EXTENDED RELEASE ORAL 2 TIMES DAILY
Qty: 180 TABLET | Refills: 1 | OUTPATIENT
Start: 2021-05-05

## 2021-05-06 ENCOUNTER — VIRTUAL VISIT (OUTPATIENT)
Dept: PALLIATIVE MEDICINE | Facility: CLINIC | Age: 31
End: 2021-05-06
Payer: COMMERCIAL

## 2021-05-06 DIAGNOSIS — M62.838 MUSCLE SPASM: ICD-10-CM

## 2021-05-06 DIAGNOSIS — G61.81 CHRONIC INFLAMMATORY DEMYELINATING POLYNEUROPATHY (H): Primary | ICD-10-CM

## 2021-05-06 PROCEDURE — 99214 OFFICE O/P EST MOD 30 MIN: CPT | Mod: 95 | Performed by: NURSE PRACTITIONER

## 2021-05-06 RX ORDER — DULOXETIN HYDROCHLORIDE 30 MG/1
30 CAPSULE, DELAYED RELEASE ORAL DAILY
Qty: 30 CAPSULE | Refills: 1 | Status: SHIPPED | OUTPATIENT
Start: 2021-05-06 | End: 2021-07-05

## 2021-05-06 RX ORDER — TIZANIDINE 2 MG/1
2-4 TABLET ORAL 3 TIMES DAILY PRN
Qty: 50 TABLET | Refills: 1 | Status: SHIPPED | OUTPATIENT
Start: 2021-05-06 | End: 2021-05-24

## 2021-05-06 RX ORDER — DULOXETIN HYDROCHLORIDE 60 MG/1
60 CAPSULE, DELAYED RELEASE ORAL DAILY
Qty: 30 CAPSULE | Refills: 1 | Status: SHIPPED | OUTPATIENT
Start: 2021-05-06 | End: 2021-06-14 | Stop reason: ALTCHOICE

## 2021-05-06 ASSESSMENT — PAIN SCALES - GENERAL: PAINLEVEL: EXTREME PAIN (8)

## 2021-05-06 NOTE — PATIENT INSTRUCTIONS
1.  Pain Physical Therapy:    Continue rehab physical therapy as planned- focus on your back as this sounds like it has been a major issue.    2.  Pain Psychologist to address relaxation, behavioral change, coping style, and other factors important to improvement.     YES  Continue regular sessions with Jasmin Prince PsyD, LP as planned.    3.  Medication Management:     1. Decrease Cymbalta to 90mg daily. If no increase in pain we will continue to decrease until off. Please call right away if you think your pain is worsening.    2. Discontinue Robaxin. Try tizanidine 2-4mg three times daily as needed for muscle spasm/back pain. Caution- this can be sedating and slow down your bowels.     3 . Reach out to dispensary to adjust medical cannabis to only yellow (or 50-50 products) as these seem to help some without side effects.    4.  Potential procedures: not at this time.     5.  Referrals: continue follow up with Dr. Chua with Neurology. Go to follow up appointment with Thad TOVAR. .   6.  Follow up with RG Puri CNP in 6-8 weeks. We may only have another 1-2 visits if our progress continues to be limited.       ----------------------------------------------------------------  Clinic Number:  [phone]     Call with any questions about your care and for scheduling assistance.     Calls are returned Monday through Friday between 8 AM and 4:30 PM. We usually get back to you within 2 business days depending on the issue/request.    If we are prescribing your medications:    For opioid medication refills, call the clinic or send a Keego message 7 days in advance.  Please include:    Name of requested medication    Name of the pharmacy.    For non-opioid medications, call your pharmacy directly to request a refill. Please allow 3-4 days to be processed.     Per [place] State Law:    All controlled substance prescriptions must be filled within 30 days of being written.      For those controlled  substances allowing refills, pickup must occur within 30 days of last fill.      We believe regular attendance is key to your success in our program!      Any time you are unable to keep your appointment we ask that you call us at least 24 hours in advance to cancel.This will allow us to offer the appointment time to another patient.     Multiple missed appointments may lead to dismissal from the clinic.

## 2021-05-06 NOTE — PROGRESS NOTES
This is a recent snapshot of the patient's [facility] medical record.  For current drug dose and complete information and questions, call [phone] or In [facility] pool, fv home infusion ([id number])  CSN Number:  [id number]

## 2021-05-12 ENCOUNTER — THERAPY VISIT (OUTPATIENT)
Dept: PHYSICAL THERAPY | Facility: CLINIC | Age: 31
End: 2021-05-12
Payer: COMMERCIAL

## 2021-05-12 DIAGNOSIS — M54.6 BILATERAL THORACIC BACK PAIN, UNSPECIFIED CHRONICITY: Primary | ICD-10-CM

## 2021-05-12 PROCEDURE — 97110 THERAPEUTIC EXERCISES: CPT | Mod: GP | Performed by: PHYSICAL THERAPIST

## 2021-05-12 PROCEDURE — 97112 NEUROMUSCULAR REEDUCATION: CPT | Mod: GP | Performed by: PHYSICAL THERAPIST

## 2021-05-12 PROCEDURE — 97530 THERAPEUTIC ACTIVITIES: CPT | Mod: GP | Performed by: PHYSICAL THERAPIST

## 2021-05-24 DIAGNOSIS — M62.838 MUSCLE SPASM: ICD-10-CM

## 2021-05-24 RX ORDER — TIZANIDINE 2 MG/1
2-4 TABLET ORAL 3 TIMES DAILY PRN
Qty: 50 TABLET | Refills: 1 | Status: SHIPPED | OUTPATIENT
Start: 2021-05-24 | End: 2021-11-23 | Stop reason: ALTCHOICE

## 2021-05-24 NOTE — CONFIDENTIAL NOTE
Signed Prescriptions:                        Disp   Refills    tiZANidine (ZANAFLEX) 2 MG tablet          50 tab*1        Sig: Take 1-2 tablets (2-4 mg) by mouth 3 times daily as           needed for muscle spasms  Authorizing Provider: GLO LOZA, RG MUSTAFA  [facility]

## 2021-05-24 NOTE — TELEPHONE ENCOUNTER
Received fax from pharmacy requesting refill(s) for tiZANidine (ZANAFLEX) 2 MG tablet     Last refilled on 05/17/21    Pt last seen on 05/06/21  Next appt scheduled for 06/10/21    E-prescribe to:  [id number] - [place], [place] - [address] AT [facility]     Will facilitate refill.      Amanda Joyce MA  [facility] [place]

## 2021-05-24 NOTE — TELEPHONE ENCOUNTER
vMobolashandaSkysheet message sent with Rx approval from provider.  Ambrosio  Pain Clinic Management Team

## 2021-05-25 ENCOUNTER — HOME INFUSION (PRE-WILLOW HOME INFUSION) (OUTPATIENT)
Dept: PHARMACY | Facility: CLINIC | Age: 31
End: 2021-05-25

## 2021-05-26 ENCOUNTER — TELEPHONE (OUTPATIENT)
Dept: FAMILY MEDICINE | Facility: CLINIC | Age: 31
End: 2021-05-26

## 2021-05-26 DIAGNOSIS — I26.99 PULMONARY EMBOLISM WITH INFARCTION (H): ICD-10-CM

## 2021-05-26 NOTE — TELEPHONE ENCOUNTER
Patient is calling and requesting this be addressed ASAP.   Discussed refill protocol.   Routing refill request to provider for review/approval because:  Labs not current:  Creatinine Clearance.     Wilda Beckwith RN

## 2021-05-26 NOTE — TELEPHONE ENCOUNTER
Patient needs refill of her blood thinner  Sent to [facility]    Call if questions  [phone]  Ok to leave message

## 2021-05-26 NOTE — TELEPHONE ENCOUNTER
Pt notifed that rx sent to pharmacy.  She states at this time she does not any other refills, and that the pharmacy should do it for her automatically.

## 2021-05-26 NOTE — TELEPHONE ENCOUNTER
This writer attempted to contact patient on 05/26/21      Reason for call name of the medication and left detailed message.      If patient calls back:   Registered Nurse called. Follow Triage Call workflow        Summer Terrazas RN

## 2021-05-27 ENCOUNTER — DOCUMENTATION ONLY (OUTPATIENT)
Dept: PHARMACY | Facility: CLINIC | Age: 31
End: 2021-05-27

## 2021-05-27 ENCOUNTER — TELEPHONE (OUTPATIENT)
Dept: FAMILY MEDICINE | Facility: CLINIC | Age: 31
End: 2021-05-27

## 2021-05-27 ENCOUNTER — HOME INFUSION (PRE-WILLOW HOME INFUSION) (OUTPATIENT)
Dept: PHARMACY | Facility: CLINIC | Age: 31
End: 2021-05-27

## 2021-05-27 NOTE — TELEPHONE ENCOUNTER
Kristi, RN with [facility] is in patient's home, she noted that the patient has complained of falling in the past 1 month.  She has fallen about 3 times and most recently a week and a half ago.    Albina states patient's blood pressure in the morning before her BP medications are in the 120s-130s and when she has taken her BP medications, systolic reading goes down to 90s-low 100s.    The patient has several providers, she had a recent medication change, Prazosin started by Dr. Matute on 4/22/21.    Kristi also notes the patient is on 3 different B vitamins:     B12, B Complex and B 50 complex.    The patient has not had any episodes of syncope, the times that she has fallen, it is when she was already walking, not necessarily when she just stands up.  She has not hit her head or injured herself badly.    Routing to Crissy Madrigal PA-C to please review and advise.      Would MTM be beneficial and/or clinic visit?    Piper Garcia RN, [facility]

## 2021-05-27 NOTE — TELEPHONE ENCOUNTER
TEN Berry, with home care is calling to report that she has discovered pt has been making medication errors with her duloxetine.   TEN Berry, is requesting instructions on how to safely return pt to the correct dose of duloxetine.      TEN Berry, states pt has the following 2 orders for duloxetine for a daily total of 90 mg (she believes pt should be taking 60 mg in am and 30 mg in the pm), but pt has been taking 90 mg twice daily (180 mg daily) therefore exceeding the  maximum recommended daily dose of duloxetine:        TEN Berry, states that it appears that pt has been taking 90 mg of duloxetine twice daily (180 mg daily) since last order placed on 5/6/21.  TEN Berry, requests a call back to herself and the pt with instructions.   Is is safe for pt to abruptly stop the dose (90 mg twice daily) and resume as ordered (60 mg in am & 30 mg in pm), or does pt need to be weaned down to intended dose?    JONATHAN RubalcavaN, RN

## 2021-05-27 NOTE — TELEPHONE ENCOUNTER
Schedule a clinic visit but do not take a same day appointment - needs orthostats and face to face evaluation

## 2021-05-27 NOTE — TELEPHONE ENCOUNTER
I recommend the patient cut back duloxetine to the prescribed dose of 90 mg a day.  Tapering is not required.

## 2021-05-27 NOTE — PROGRESS NOTES
Skilled Nurse visit in the  patient home to administer privigen.  No recent elevated temperature, fever, chills, productive cough, coughing for 3 weeks or longer or hemoptysis, abnormal vital signs, night sweats, chest pain. No  decrease in your appetite, unexplained weight loss or fatigue.  No other new onset medical symptoms.  Current weight 260 lbs.  PIV placed in left hand for part of infusion and then right hand for part of infusion, 2 attempt/s total.  Pre medicated with tylenol 650 mg PO, benadryl 50 mg PO, and solucortef 100 mg IV push. Labs drawn none, Infusion completed without complication or reaction. Pt reports therapy is possibly effective in managing symptoms related to therapy.    Kristi Wilson RN BSN  [facility]  Email: [email or web address]  Phone: [phone]

## 2021-05-27 NOTE — TELEPHONE ENCOUNTER
"RN contacted pt and notified her of provider message below. RN assisted pt with scheduling in-person office visit for next available appointment with PCP that is not a \"same day\" appointment type. See additional 5/27/21 telephone encounter.    Next 5 appointments (look out 90 days)    Jun 04, 2021 10:00 AM  Return Visit with Thad Vázquez PA-C  [facility] ([facility]) [address] [place] [id number]  [phone]   Jun 11, 2021 11:40 AM  Office Visit with Crissy Madrigal PA-C  [facility] ([facility] ) [address] [place] [id number]  [phone]   Jun 17, 2021 11:00 AM  Nurse Only with [facility] ANCILLARY  [facility] ([facility] ) [address] [place] [id number]  [phone]        ANGE Rubalcava, RN    "

## 2021-05-27 NOTE — TELEPHONE ENCOUNTER
RN returned call to TEN Berry, and relayed provider message below as written. Pt has already taken 90 mg of duloxetine today, so she will return to dosing as prescribed tomorrow.    RN also relayed the message per Dr. Olivares, to pt. Pt indicates understanding of issues and agrees with the plan.   RN also reviewed additional 5/27/21 telephone encounter with pt and assisted pt with scheduling.     ANGE Rubalcava, RN

## 2021-06-01 DIAGNOSIS — E87.6 HYPOKALEMIA: ICD-10-CM

## 2021-06-02 ENCOUNTER — ANCILLARY PROCEDURE (OUTPATIENT)
Dept: GENERAL RADIOLOGY | Facility: CLINIC | Age: 31
End: 2021-06-02
Attending: PHYSICIAN ASSISTANT
Payer: COMMERCIAL

## 2021-06-02 ENCOUNTER — HOME INFUSION (PRE-WILLOW HOME INFUSION) (OUTPATIENT)
Dept: PHARMACY | Facility: CLINIC | Age: 31
End: 2021-06-02

## 2021-06-02 ENCOUNTER — OFFICE VISIT (OUTPATIENT)
Dept: NEUROLOGY | Facility: CLINIC | Age: 31
End: 2021-06-02
Payer: COMMERCIAL

## 2021-06-02 VITALS
SYSTOLIC BLOOD PRESSURE: 131 MMHG | DIASTOLIC BLOOD PRESSURE: 89 MMHG | HEART RATE: 66 BPM | RESPIRATION RATE: 16 BRPM | OXYGEN SATURATION: 95 %

## 2021-06-02 DIAGNOSIS — G54.9 SENSORY GANGLIONOPATHY: Primary | ICD-10-CM

## 2021-06-02 DIAGNOSIS — S22.000A COMPRESSION FRACTURE OF THORACIC VERTEBRA, INITIAL ENCOUNTER, UNSPECIFIED THORACIC VERTEBRAL LEVEL: ICD-10-CM

## 2021-06-02 PROBLEM — Z78.9 ALCOHOL USE: Status: ACTIVE | Noted: 2021-02-06

## 2021-06-02 PROBLEM — K85.90 ACUTE PANCREATITIS: Status: ACTIVE | Noted: 2018-08-18

## 2021-06-02 PROBLEM — E87.1 HYPONATREMIA: Status: ACTIVE | Noted: 2021-02-06

## 2021-06-02 PROBLEM — J32.9 SINUSITIS: Status: ACTIVE | Noted: 2019-05-26
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
## 2021-06-02 PROBLEM — I26.99 ACUTE MASSIVE PULMONARY EMBOLISM (H): Status: ACTIVE | Noted: 2019-05-13

## 2021-06-02 PROBLEM — G47.9 SLEEP DISTURBANCES: Status: ACTIVE | Noted: 2019-05-18

## 2021-06-02 PROBLEM — N17.9 AKI (ACUTE KIDNEY INJURY) (H): Status: ACTIVE | Noted: 2019-05-26

## 2021-06-02 PROBLEM — S01.01XA SCALP LACERATION, INITIAL ENCOUNTER: Status: ACTIVE | Noted: 2020-02-29

## 2021-06-02 PROBLEM — Z79.899 MEDICAL CANNABIS USE: Status: ACTIVE | Noted: 2021-02-06

## 2021-06-02 PROBLEM — E83.42 HYPOMAGNESEMIA: Status: ACTIVE | Noted: 2021-02-06

## 2021-06-02 PROBLEM — Z72.0 TOBACCO USE: Status: ACTIVE | Noted: 2021-02-06

## 2021-06-02 PROBLEM — K55.9 ISCHEMIC COLITIS (H): Status: ACTIVE | Noted: 2019-05-26

## 2021-06-02 PROBLEM — F10.90 ALCOHOL USE: Status: ACTIVE | Noted: 2021-02-06

## 2021-06-02 PROCEDURE — 72080 X-RAY EXAM THORACOLMB 2/> VW: CPT | Performed by: STUDENT IN AN ORGANIZED HEALTH CARE EDUCATION/TRAINING PROGRAM

## 2021-06-02 PROCEDURE — 99214 OFFICE O/P EST MOD 30 MIN: CPT | Performed by: PSYCHIATRY & NEUROLOGY

## 2021-06-02 PROCEDURE — 72100 X-RAY EXAM L-S SPINE 2/3 VWS: CPT | Mod: GC | Performed by: RADIOLOGY

## 2021-06-02 RX ORDER — POTASSIUM CHLORIDE 1.5 G/1.58G
20 POWDER, FOR SOLUTION ORAL 2 TIMES DAILY
Qty: 60 PACKET | Refills: 3 | Status: SHIPPED | OUTPATIENT
Start: 2021-06-02 | End: 2021-06-11 | Stop reason: ALTCHOICE

## 2021-06-02 ASSESSMENT — PAIN SCALES - GENERAL: PAINLEVEL: EXTREME PAIN (9)

## 2021-06-02 NOTE — PROGRESS NOTES
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  In April 2020 she developed confirmed COVID infection. About 4 weeks later her neurologic symptoms began. Her first symptom was tingling in her hands where it then  progressed to a burning in her hands about 4-5 days after the numbness.  It then quickly progressed to involving bilateral legs below the knee and then her feet. She felt weak in her hands and feet. Fine motor tasks and gait became impaired. She has trouble picking up objects because she has to watch herself  objects. She needed a walker. She also felt that her speech became periodically slurred. The sensory symptoms that included pain and allodynia were most problematic in her hands and feet. NCS in 7/20 showed absent sensory responses in the upper and lower limbs and normal motor responses. In 8/20 IVIG 2 gm/kg loading and then 1 gm/kg q 3 week maintenance was started. Through the fall 2020 she made slow improvements, particular in function and gait. By 11/20 she felt \"50%\" better. In 1/21 she reduced IVIG from 1 gm/kg q 3 weeks to q 4 week. In 2021 her neuropathy was stable but she developed thoracic compression fractures, and gait became limited by pain.     Interval History:   I last saw her 3/31/21. Unfortunately she has struggled with back pain over the last couple months. She was recently found to have compression fractures, and is now in a brace. She has seen Thad Vázquez, neurosurgery for this. Shama gait remains unstable. She last fell about 1 week ago. She feels week in her limbs, and feels like she has trouble knowing where her legs are when she walks. She continues to have numbness below her knees and elbows. Back pain remains substantial.  She continues to follow in the Pain clinic.     Prior pertinent laboratory work-up:  5/2020:  ANH 1:160. Vitamin B1 low (45). B12 low/normal " (285). Negative/normal double stranded DNA, ANCA, C-reactive, RF, GM1, GD1, Gq1b, vitamin A, B6, Vit E, SSA, SSB undetectable.  6/2020 CSF: 0 WBC, 0 RBC, protein 58 glucose 29.  7/2020: Normal Vitamin B1, B12, folate    8/2020: TS-HDS mildly elevated at [address] ([id number]). Negative FGFR3, Immunofixation, paraneoplastic panel,GD1a.  12/20: B1 low (66)  1/2021:  B1 and B6 slightly elevated (B1 = 193, B6 = 182). Normal B12  3/21: Hba1c 5.0    Prior electrophysiologic work-up:  7/23/20 NCS/EMG showed all absent upper and lower limb SNAPS and all normal upper and lower limb motor responses.   8/3/20 NCS/EMG showed absent right  median, ulnar, and sural sensory studies with radial having attenuated amplitude.   1/13/21: NCS still showed a non length-dependent sensory neuropathy or ganglionopathy, but compared to prior studies performed 8/3/20 and 7/23/20 there has been unequivocal interval improvement in sensory response amplitudes in the lower > upper limbs.      Prior pertinent imaging work-up:  5/20: MRI brain with and without contrast was essentially normal  5/20: MRI C spine with and without contrast showed no abnormal enhancement in the spinal cord, thecal sac or cervical vertebrae. No spinal canal or neural foraminal narrowing.  4/21: MRI T and LS spine showed acute compression fractures of T6, T10 and T9 vertebrae, which are new compared to 11/5/2020.     Past Medical History:   Past Medical History:   Diagnosis Date     Acute kidney injury (H) 05/13/2019     Cardiac arrest (H) 05/13/2019     Earache or other ear, nose, or throat complaint 12/15    tyroid     Pulmonary embolus (H) 05/13/2019     Past Surgical History:  Past Surgical History:   Procedure Laterality Date     GYN SURGERY      2 C-sections     KNEE SURGERY  most recently - [id number]    3 surgeries in [place]     LAPAROSCOPIC GASTRIC SLEEVE N/A 3/26/2019    Procedure: Laparoscopic Sleeve Gastrectomy;  Surgeon: Luan Lopez MD;  Location:  OR      ORTHOPEDIC SURGERY      2 knee meniscus surgery     Family history:    There is no known family history of hereditary neuropathies or other neuromuscular disorders.     Social History:    Social History     Tobacco Use     Smoking status: Current Every Day Smoker     Packs/day: 0.30     Years: 1.00     Pack years: 0.30     Types: Cigarettes     Start date: 11/20/2016     Smokeless tobacco: Never Used   Substance Use Topics     Alcohol use: Not Currently     Comment: Quit drinking when last hospitalized     Drug use: No     Comment: medical marijuana card.  vapes      Medical Allergies:   No Known Allergies     Current Medications:    Current Outpatient Medications:      Blood Pressure Monitoring (BLOOD PRESSURE MONITOR/ARM) BRAYAN, , Disp: , Rfl:      diphenhydrAMINE (BENADRYL) 50 MG/ML injection, , Disp: , Rfl:      DULoxetine (CYMBALTA) 30 MG capsule, Take 1 capsule (30 mg) by mouth daily, Disp: 30 capsule, Rfl: 1     DULoxetine (CYMBALTA) 60 MG capsule, Take 1 capsule (60 mg) by mouth daily, Disp: 30 capsule, Rfl: 1     folic acid (FOLVITE) 1 MG tablet, Take 1 tablet (1 mg) by mouth daily, Disp: 30 tablet, Rfl: 11     lisinopril (ZESTRIL) 10 MG tablet, Take 1 tablet (10 mg) by mouth daily, Disp: 90 tablet, Rfl: 0     Multiple Vitamins-Minerals (MULTIVITAMIN ADULT) CHEW, Take 1 chew tab by mouth 2 times daily, Disp: 60 tablet, Rfl: 11     NEW MED, MEDICAL CANABIS, Disp: , Rfl:      omeprazole (PRILOSEC) 20 MG DR capsule, Take 1 capsule (20 mg) by mouth daily, Disp: 28 capsule, Rfl: 11     ondansetron (ZOFRAN-ODT) 4 MG ODT tab, Take 1 tablet (4 mg) by mouth every 8 hours as needed for nausea, Disp: 30 tablet, Rfl: 1     polyethylene glycol (MIRALAX) 17 GM/SCOOP powder, Take 17 g (1 capful) by mouth daily, Disp: 850 g, Rfl: 3     potassium chloride (KLOR-CON) 20 MEQ packet, Take 20 mEq by mouth 2 times daily, Disp: 60 packet, Rfl: 0     prazosin (MINIPRESS) 1 MG capsule, Take 1 capsule (1 mg) by mouth At Bedtime,  Disp: 30 capsule, Rfl: 1     Pregabalin (LYRICA) 200 MG capsule, Take 1 capsule (200 mg) by mouth 3 times daily, Disp: 90 capsule, Rfl: 3     PRIVIGEN 20 GM/200ML SOLN, , Disp: , Rfl:      PRIVIGEN 40 GM/400ML SOLN, , Disp: , Rfl:      QUEtiapine (SEROQUEL) 300 MG tablet, Take 1 tablet (300 mg) by mouth At Bedtime, Disp: 30 tablet, Rfl: 1     rivaroxaban ANTICOAGULANT (XARELTO ANTICOAGULANT) 20 MG TABS tablet, Take 1 tablet (20 mg) by mouth daily (with dinner), Disp: 90 tablet, Rfl: 0     tiZANidine (ZANAFLEX) 2 MG tablet, Take 1-2 tablets (2-4 mg) by mouth 3 times daily as needed for muscle spasms, Disp: 50 tablet, Rfl: 1     vitamin (B COMPLEX) tablet, Take 1 tablet by mouth daily, Disp: 90 tablet, Rfl: 3     vitamin C (ASCORBIC ACID) 1000 MG TABS, Take 1 tablet (1,000 mg) by mouth daily, Disp: 30 tablet, Rfl: 0     vitamin D3 (CHOLECALCIFEROL) 2000 units (50 mcg) tablet, Take 1 tablet (2,000 Units) by mouth daily, Disp: 30 tablet, Rfl: 0    Current Facility-Administered Medications:      cyanocobalamin injection 1,000 mcg, 1,000 mcg, Intramuscular, Q30 Days, Crissy Madrigal PA-C, 1,000 mcg at 04/01/21 1130     medroxyPROGESTERone (DEPO-PROVERA) syringe 150 mg, 150 mg, Intramuscular, Q90 Days, Crissy Madrigal PA-C, 150 mg at 04/01/21 1131    Review of Systems: A complete review of systems was obtained and was negative except for what was noted above.     Physical examination:    /89   Pulse 66   Resp 16   SpO2 95%     General Appearance: NAD    Skin: There are no rashes or other skin lesions.    Musculoskeletal:  There is no scoliosis, lordosis, kyphosis, pes cavus, or hammertoes.    Neurologic examination:    Mental status:  Patient is alert, attentive, and oriented x 3.  Language is coherent and fluent without aphasia.  Memory, comprehension and ability to follow commands were intact.       Cranial nerves:   Pupils were round and reacted to light.  Extraocular movements were full. There was  no face, jaw, palate or tongue weakness or atrophy. Hearing was grossly intact.  Shoulder shrug was normal.       Motor exam: No atrophy or fasciculations.   Manual muscle testing revealed the following MRC grade muscle power:   Right Left   Neck flexion 5    Neck extension: 5    Shoulder abduction:  5 5   Elbow extension: 5 5   Elbow flexion:  5 5   Wrist flexion:  5 5   Wrist extension:  5 5   FDI 5 4+   Hip flexion 5 5   Knee flexion 5 5   Knee extension 5 5   Dorsiflexion 5 5   Plantar flexion 5 5   Green indicates improved compared to last exam  Red indicates worse compared to last exam    Complex motor skills: Mild postural hand tremor. Mild ataxia with FNF.    Sensory exam: Vibration absent toe, reduced at ankle and finger tips. Pin reduced below the knee and below the elbow on both sides     Gait: Antalgic. She appears to be in pain. Uses walker and back brace.     Deep tendon reflexes:   Right Left   Triceps 1 1   Biceps 2 2   Brachioradialis 2 2   Knee jerk 0 0   Ankle jerk 0 0     Neuropathy Assessments  Neurology Assessments 2021 3/31/2021 2021 2020 2020 8/3/2020   RODS CIDP/MGUSP Score 22 33 32 29 16 20   Time for 'Up and Go' test- Seconds: - 25.7 9.79 10.2 15.9 24.09      Strength: 2021 3/31/2021 2021 2020 2020 8/3/2020   Right hand strength in K 24 25 18 20 16   Left hand strength in K 28 28 17 22 20     Immunotherapy 2021 3/31/2021 2021 2020 2020 8/3/2020   Time since last IVIG (days): 1 week 27 days 1 week 12 5 days -   Current treatment: IVIG 1 gm/kg q 4 weeks IVIG 1gm/kg q 4 weeks IVIG 1 gm/kg q 3 weeks IVIG 1gm/kg x6fhexm IVIG 1 gm/kg q 3 weeks none     Assessment:    Hilaria Bonner is a 30 year old woman with a 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 Onset about 1 year ago. The presumption is that the rapid onset non-length dependant sensory neuropathy has an immune mediated etiology.Support  for this comes in the form of response to IVIG. As documented above, several outcomes have improved during the treatment period. Further supporting this is the presence of a TS-HDS antibody. This antibody is of uncertain significance, but suggests neurologic dysimmunity. She also had well documented nutritional deficiencies (B1) at the time of neuropathy onset. These likely contribute to her neuropathy as well, but would not explain the current fluctuations. She also has back pain and compression fractures. These are unrelated to the neuropathy but certainly contribute to her overall level of disability. My intention is to try to wean IVIG to the lowest effective dose, and wean off if possible. Unfortunately I do not think this is the right time to do that. While I can not tell for certain if active sensory neuropathy/ganglionopathy or back pain/compression fractures are the main determinant of her disability, I am concerned that several outcomes are worse and therefor prefer to err on the side of over treatment of the presumed immune mediated neuropathy. I am going to increase her IVIG as below, and reassess the taper plan when her back is healed and she can resume PT.      Plan:      1. IVIG: Increase IVIG from 1 g/kg q 4 weeks to 1 gm/kg q 3 weeks. May try to taper again after next visit, pending clinical course.   2. Thoracic compression fracture: Appreciate collateral care of neurosurgery team  3. Pain:  I encouraged her to continue to follow in the multidisciplinary pain clinic. Appreciate collateral care.   4. Nutritional: Continue B1 and B12 supplementation. B6 has slightly elevated. Will recheck this today.   5. Gait and back pain: Resume PT once thoracic compression fractures cleared by neurosurgery  6. Labs: B6. Also will recheck serum IF, SSa, SSb  7. Follow up in 2 months. Sooner if needed.   ---

## 2021-06-02 NOTE — NURSING NOTE
Chief Complaint   Patient presents with     NEUROPATHY     [facility] RETURN NEUROPATHY 2 MO F/U        Harvinder Ascencio, EMT

## 2021-06-02 NOTE — LETTER
"6/2/2021       RE: Hilaria Bonner  [address]  [address]  [facility] [address]     Dear Colleague,    Thank you for referring your patient, Hilaria Bonner, to the [facility] [place] [facility]. Please see a copy of my visit note below.    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  In April 2020 she developed confirmed COVID infection. About 4 weeks later her neurologic symptoms began. Her first symptom was tingling in her hands where it then  progressed to a burning in her hands about 4-5 days after the numbness.  It then quickly progressed to involving bilateral legs below the knee and then her feet. She felt weak in her hands and feet. Fine motor tasks and gait became impaired. She has trouble picking up objects because she has to watch herself  objects. She needed a walker. She also felt that her speech became periodically slurred. The sensory symptoms that included pain and allodynia were most problematic in her hands and feet. NCS in 7/20 showed absent sensory responses in the upper and lower limbs and normal motor responses. In 8/20 IVIG 2 gm/kg loading and then 1 gm/kg q 3 week maintenance was started. Through the fall 2020 she made slow improvements, particular in function and gait. By 11/20 she felt \"50%\" better. In 1/21 she reduced IVIG from 1 gm/kg q 3 weeks to q 4 week. In 2021 her neuropathy was stable but she developed thoracic compression fractures, and gait became limited by pain.     Interval History:   I last saw her 3/31/21. Unfortunately she has struggled with back pain over the last couple months. She was recently found to have compression fractures, and is now in a brace. She has seen Thad Vázquez, neurosurgery for this. Hilaria's gait remains unstable. She last fell about 1 week ago. She " feels week in her limbs, and feels like she has trouble knowing where her legs are when she walks. She continues to have numbness below her knees and elbows. Back pain remains substantial.  She continues to follow in the Pain clinic.     Prior pertinent laboratory work-up:  5/2020:  ANH 1:160. Vitamin B1 low (45). B12 low/normal (285). Negative/normal double stranded DNA, ANCA, C-reactive, RF, GM1, GD1, Gq1b, vitamin A, B6, Vit E, SSA, SSB undetectable.  6/2020 CSF: 0 WBC, 0 RBC, protein 58 glucose 29.  7/2020: Normal Vitamin B1, B12, folate    8/2020: TS-HDS mildly elevated at 34[address] (N<[id number]). Negative FGFR3, Immunofixation, paraneoplastic panel,GD1a.  12/20: B1 low (66)  1/2021:  B1 and B6 slightly elevated (B1 = 193, B6 = 182). Normal B12  3/21: Hba1c 5.0    Prior electrophysiologic work-up:  7/23/20 NCS/EMG showed all absent upper and lower limb SNAPS and all normal upper and lower limb motor responses.   8/3/20 NCS/EMG showed absent right  median, ulnar, and sural sensory studies with radial having attenuated amplitude.   1/13/21: NCS still showed a non length-dependent sensory neuropathy or ganglionopathy, but compared to prior studies performed 8/3/20 and 7/23/20 there has been unequivocal interval improvement in sensory response amplitudes in the lower > upper limbs.      Prior pertinent imaging work-up:  5/20: MRI brain with and without contrast was essentially normal  5/20: MRI C spine with and without contrast showed no abnormal enhancement in the spinal cord, thecal sac or cervical vertebrae. No spinal canal or neural foraminal narrowing.  4/21: MRI T and LS spine showed acute compression fractures of T6, T10 and T9 vertebrae, which are new compared to 11/5/2020.     Past Medical History:   Past Medical History:   Diagnosis Date     Acute kidney injury (H) 05/13/2019     Cardiac arrest (H) 05/13/2019     Earache or other ear, nose, or throat complaint 12/15    tyroid     Pulmonary embolus (H) 05/13/2019      Past Surgical History:  Past Surgical History:   Procedure Laterality Date     GYN SURGERY      2 C-sections     KNEE SURGERY  most recently - [id number]    3 surgeries in [place]     LAPAROSCOPIC GASTRIC SLEEVE N/A 3/26/2019    Procedure: Laparoscopic Sleeve Gastrectomy;  Surgeon: Luan Lopez MD;  Location: [facility] OR     ORTHOPEDIC SURGERY      2 knee meniscus surgery     Family history:    There is no known family history of hereditary neuropathies or other neuromuscular disorders.     Social History:    Social History     Tobacco Use     Smoking status: Current Every Day Smoker     Packs/day: 0.30     Years: 1.00     Pack years: 0.30     Types: Cigarettes     Start date: 11/20/2016     Smokeless tobacco: Never Used   Substance Use Topics     Alcohol use: Not Currently     Comment: Quit drinking when last hospitalized     Drug use: No     Comment: medical marijuana card.  vapes      Medical Allergies:   No Known Allergies     Current Medications:    Current Outpatient Medications:      Blood Pressure Monitoring (BLOOD PRESSURE MONITOR/ARM) BRAYAN, , Disp: , Rfl:      diphenhydrAMINE (BENADRYL) 50 MG/ML injection, , Disp: , Rfl:      DULoxetine (CYMBALTA) 30 MG capsule, Take 1 capsule (30 mg) by mouth daily, Disp: 30 capsule, Rfl: 1     DULoxetine (CYMBALTA) 60 MG capsule, Take 1 capsule (60 mg) by mouth daily, Disp: 30 capsule, Rfl: 1     folic acid (FOLVITE) 1 MG tablet, Take 1 tablet (1 mg) by mouth daily, Disp: 30 tablet, Rfl: 11     lisinopril (ZESTRIL) 10 MG tablet, Take 1 tablet (10 mg) by mouth daily, Disp: 90 tablet, Rfl: 0     Multiple Vitamins-Minerals (MULTIVITAMIN ADULT) CHEW, Take 1 chew tab by mouth 2 times daily, Disp: 60 tablet, Rfl: 11     NEW MED, MEDICAL CANABIS, Disp: , Rfl:      omeprazole (PRILOSEC) 20 MG DR capsule, Take 1 capsule (20 mg) by mouth daily, Disp: 28 capsule, Rfl: 11     ondansetron (ZOFRAN-ODT) 4 MG ODT tab, Take 1 tablet (4 mg) by mouth every 8 hours as needed for nausea,  Disp: 30 tablet, Rfl: 1     polyethylene glycol (MIRALAX) 17 GM/SCOOP powder, Take 17 g (1 capful) by mouth daily, Disp: 850 g, Rfl: 3     potassium chloride (KLOR-CON) 20 MEQ packet, Take 20 mEq by mouth 2 times daily, Disp: 60 packet, Rfl: 0     prazosin (MINIPRESS) 1 MG capsule, Take 1 capsule (1 mg) by mouth At Bedtime, Disp: 30 capsule, Rfl: 1     Pregabalin (LYRICA) 200 MG capsule, Take 1 capsule (200 mg) by mouth 3 times daily, Disp: 90 capsule, Rfl: 3     PRIVIGEN 20 GM/200ML SOLN, , Disp: , Rfl:      PRIVIGEN 40 GM/400ML SOLN, , Disp: , Rfl:      QUEtiapine (SEROQUEL) 300 MG tablet, Take 1 tablet (300 mg) by mouth At Bedtime, Disp: 30 tablet, Rfl: 1     rivaroxaban ANTICOAGULANT (XARELTO ANTICOAGULANT) 20 MG TABS tablet, Take 1 tablet (20 mg) by mouth daily (with dinner), Disp: 90 tablet, Rfl: 0     tiZANidine (ZANAFLEX) 2 MG tablet, Take 1-2 tablets (2-4 mg) by mouth 3 times daily as needed for muscle spasms, Disp: 50 tablet, Rfl: 1     vitamin (B COMPLEX) tablet, Take 1 tablet by mouth daily, Disp: 90 tablet, Rfl: 3     vitamin C (ASCORBIC ACID) 1000 MG TABS, Take 1 tablet (1,000 mg) by mouth daily, Disp: 30 tablet, Rfl: 0     vitamin D3 (CHOLECALCIFEROL) 2000 units (50 mcg) tablet, Take 1 tablet (2,000 Units) by mouth daily, Disp: 30 tablet, Rfl: 0    Current Facility-Administered Medications:      cyanocobalamin injection 1,000 mcg, 1,000 mcg, Intramuscular, Q30 Days, Crissy Madrigal PA-C, 1,000 mcg at 04/01/21 1130     medroxyPROGESTERone (DEPO-PROVERA) syringe 150 mg, 150 mg, Intramuscular, Q90 Days, Crissy Madrigal PA-C, 150 mg at 04/01/21 1131    Review of Systems: A complete review of systems was obtained and was negative except for what was noted above.     Physical examination:    /89   Pulse 66   Resp 16   SpO2 95%     General Appearance: NAD    Skin: There are no rashes or other skin lesions.    Musculoskeletal:  There is no scoliosis, lordosis, kyphosis, pes cavus, or  ebony.    Neurologic examination:    Mental status:  Patient is alert, attentive, and oriented x 3.  Language is coherent and fluent without aphasia.  Memory, comprehension and ability to follow commands were intact.       Cranial nerves:   Pupils were round and reacted to light.  Extraocular movements were full. There was no face, jaw, palate or tongue weakness or atrophy. Hearing was grossly intact.  Shoulder shrug was normal.       Motor exam: No atrophy or fasciculations.   Manual muscle testing revealed the following MRC grade muscle power:   Right Left   Neck flexion 5    Neck extension: 5    Shoulder abduction:  5 5   Elbow extension: 5 5   Elbow flexion:  5 5   Wrist flexion:  5 5   Wrist extension:  5 5   FDI 5 4+   Hip flexion 5 5   Knee flexion 5 5   Knee extension 5 5   Dorsiflexion 5 5   Plantar flexion 5 5   Green indicates improved compared to last exam  Red indicates worse compared to last exam    Complex motor skills: Mild postural hand tremor. Mild ataxia with FNF.    Sensory exam: Vibration absent toe, reduced at ankle and finger tips. Pin reduced below the knee and below the elbow on both sides     Gait: Antalgic. She appears to be in pain. Uses walker and back brace.     Deep tendon reflexes:   Right Left   Triceps 1 1   Biceps 2 2   Brachioradialis 2 2   Knee jerk 0 0   Ankle jerk 0 0     Neuropathy Assessments  Neurology Assessments 2021 3/31/2021 2021 2020 2020 8/3/2020   RODS CIDP/MGUSP Score 22 33 32 29 16 20   Time for 'Up and Go' test- Seconds: - 25.7 9.79 10.2 15.9 24.09      Strength: 2021 3/31/2021 2021 2020 2020 8/3/2020   Right hand strength in K 24 25 18 20 16   Left hand strength in K 28 28 17 22 20     Immunotherapy 2021 3/31/2021 2021 2020 2020 8/3/2020   Time since last IVIG (days): 1 week 27 days 1 week 12 5 days -   Current treatment: IVIG 1 gm/kg q 4 weeks IVIG 1gm/kg q 4 weeks IVIG 1 gm/kg q 3 weeks  IVIG 1gm/kg [id number] IVIG 1 gm/kg q 3 weeks none     Assessment:    Hilaria Bonner is a 30 year old woman with a 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 Onset about 1 year ago. The presumption is that the rapid onset non-length dependant sensory neuropathy has an immune mediated etiology.Support for this comes in the form of response to IVIG. As documented above, several outcomes have improved during the treatment period. Further supporting this is the presence of a TS-HDS antibody. This antibody is of uncertain significance, but suggests neurologic dysimmunity. She also had well documented nutritional deficiencies (B1) at the time of neuropathy onset. These likely contribute to her neuropathy as well, but would not explain the current fluctuations. She also has back pain and compression fractures. These are unrelated to the neuropathy but certainly contribute to her overall level of disability. My intention is to try to wean IVIG to the lowest effective dose, and wean off if possible. Unfortunately I do not think this is the right time to do that. While I can not tell for certain if active sensory neuropathy/ganglionopathy or back pain/compression fractures are the main determinant of her disability, I am concerned that several outcomes are worse and therefor prefer to err on the side of over treatment of the presumed immune mediated neuropathy. I am going to increase her IVIG as below, and reassess the taper plan when her back is healed and she can resume PT.      Plan:      1. IVIG: Increase IVIG from 1 g/kg q 4 weeks to 1 gm/kg q 3 weeks. May try to taper again after next visit, pending clinical course.   2. Thoracic compression fracture: Appreciate collateral care of neurosurgery team  3. Pain:  I encouraged her to continue to follow in the multidisciplinary pain clinic. Appreciate collateral care.   4. Nutritional: Continue B1 and B12 supplementation. B6 has slightly elevated.  Will recheck this today.   5. Gait and back pain: Resume PT once thoracic compression fractures cleared by neurosurgery  6. Labs: B6. Also will recheck serum IF, SSa, SSb  7. Follow up in 2 months. Sooner if needed.   ---    Again, thank you for allowing me to participate in the care of your patient.      Sincerely,    Travon Chua MD

## 2021-06-02 NOTE — LETTER
To whom it may concern -     Hilaria Bonner is a patient under my care at the [facility]. She is unable to work at this time due to neuropathy and thoracic compression fractures. I will reassess her again in 2 months.     Thank you    Travon Chua MD  [facility]  Neurology

## 2021-06-03 ENCOUNTER — HOME INFUSION (PRE-WILLOW HOME INFUSION) (OUTPATIENT)
Dept: PHARMACY | Facility: CLINIC | Age: 31
End: 2021-06-03

## 2021-06-07 ENCOUNTER — MYC MEDICAL ADVICE (OUTPATIENT)
Dept: PSYCHIATRY | Facility: CLINIC | Age: 31
End: 2021-06-07

## 2021-06-07 ENCOUNTER — VIRTUAL VISIT (OUTPATIENT)
Dept: PSYCHIATRY | Facility: CLINIC | Age: 31
End: 2021-06-07
Payer: COMMERCIAL

## 2021-06-07 ENCOUNTER — VIRTUAL VISIT (OUTPATIENT)
Dept: BEHAVIORAL HEALTH | Facility: CLINIC | Age: 31
End: 2021-06-07
Payer: COMMERCIAL

## 2021-06-07 DIAGNOSIS — F41.1 GENERALIZED ANXIETY DISORDER: ICD-10-CM

## 2021-06-07 DIAGNOSIS — F41.1 GAD (GENERALIZED ANXIETY DISORDER): ICD-10-CM

## 2021-06-07 DIAGNOSIS — F32.1 MODERATE MAJOR DEPRESSION (H): ICD-10-CM

## 2021-06-07 DIAGNOSIS — G31.89 COGNITIVE AND NEUROBEHAVIORAL DYSFUNCTION FOLLOWING BRAIN INJURY (H): ICD-10-CM

## 2021-06-07 DIAGNOSIS — F32.1 MODERATE MAJOR DEPRESSION (H): Primary | ICD-10-CM

## 2021-06-07 DIAGNOSIS — F29 PSYCHOSIS, UNSPECIFIED PSYCHOSIS TYPE (H): Primary | ICD-10-CM

## 2021-06-07 DIAGNOSIS — S06.9XAS COGNITIVE AND NEUROBEHAVIORAL DYSFUNCTION FOLLOWING BRAIN INJURY (H): ICD-10-CM

## 2021-06-07 DIAGNOSIS — G61.81 CHRONIC INFLAMMATORY DEMYELINATING POLYNEUROPATHY (H): ICD-10-CM

## 2021-06-07 DIAGNOSIS — F09 COGNITIVE AND NEUROBEHAVIORAL DYSFUNCTION FOLLOWING BRAIN INJURY (H): ICD-10-CM

## 2021-06-07 PROCEDURE — 99214 OFFICE O/P EST MOD 30 MIN: CPT | Mod: 95 | Performed by: PSYCHIATRY & NEUROLOGY

## 2021-06-07 PROCEDURE — 90832 PSYTX W PT 30 MINUTES: CPT | Mod: 95 | Performed by: SOCIAL WORKER

## 2021-06-07 RX ORDER — QUETIAPINE FUMARATE 300 MG/1
300 TABLET, FILM COATED ORAL AT BEDTIME
Qty: 30 TABLET | Refills: 1 | Status: CANCELLED | OUTPATIENT
Start: 2021-06-07

## 2021-06-07 RX ORDER — DULOXETIN HYDROCHLORIDE 30 MG/1
30 CAPSULE, DELAYED RELEASE ORAL DAILY
Qty: 30 CAPSULE | Refills: 1 | Status: CANCELLED | OUTPATIENT
Start: 2021-06-07

## 2021-06-07 RX ORDER — PRAZOSIN HYDROCHLORIDE 1 MG/1
1 CAPSULE ORAL AT BEDTIME
Qty: 30 CAPSULE | Refills: 1 | Status: SHIPPED | OUTPATIENT
Start: 2021-06-07 | End: 2021-07-14

## 2021-06-07 RX ORDER — DULOXETIN HYDROCHLORIDE 60 MG/1
60 CAPSULE, DELAYED RELEASE ORAL DAILY
Qty: 30 CAPSULE | Refills: 1 | Status: CANCELLED | OUTPATIENT
Start: 2021-06-07

## 2021-06-07 RX ORDER — OLANZAPINE 5 MG/1
5 TABLET ORAL AT BEDTIME
Qty: 30 TABLET | Refills: 1 | Status: SHIPPED | OUTPATIENT
Start: 2021-06-07 | End: 2021-07-14

## 2021-06-07 NOTE — PROGRESS NOTES
This is a recent snapshot of the patient's [facility] medical record.  For current drug dose and complete information and questions, call [phone] or In Basket pool, fv home infusion ([id number])  CSN Number:  [id number]

## 2021-06-07 NOTE — TELEPHONE ENCOUNTER
Hilaria Lan,    Here is the information about the psychologists who see people with complex medical problems:    Health Psychologists see people who have chronic health diagnoses and typically do not see folks who have severe psychotic disorders, bipolar disorder, or personality disorders.     Please select a provider from the following list and call to request an appointment.     Health Psychologists in the [place] [facility]   Gaye Roe, Ph.D., L.P. [phone]   Dee Lindsey Ph.D. [phone]   Yessy Emerson, Ph.D., L.P. [phone]             Channing Martinez, Ph.D., A.B.P.P., L.P. [phone]   Michelle Early, Ph.D., L.P. [phone]     If you want to read about any of us:   [email or web address]   [email or web address]   [email or web address]   [email or web address]     [email or web address]     Let me know if you need any help with this.    Regards,  Sheree Matute MD  Collaborative Care Psychiatry  [facility]

## 2021-06-07 NOTE — PATIENT INSTRUCTIONS
Discontinue quetiapine.    Start olanzapine 5 mg at bedtime.     Continue prazosin 1 mg at bedtime for nightmares.     Continue all other medications per your primary care provider.     Expect a call regarding scheduling an appointment with neuropsychiatry.     Continue all other medications per your primary care provider.    Schedule an appointment with me in 4 weeks or sooner as needed.  You may call [facility] at [phone] to schedule.    [place] Resources:      Go to the Emergency Department as needed or call after hours crisis line at [phone].      To schedule individual or family therapy, call [facility] at [phone].     Follow up with primary care provider as planned or sooner for acute medical concerns.    Call the psychiatric nurse line with medication questions or concerns at [phone].    [employer] may be used to communicate with your provider, but this is not intended to be used for emergencies.    Community Resources:      National Suicide Prevention Lifeline: 680.613.9403 (TTY: 685.363.7385). Call anytime for help.  (www.suicidepreventionlifeline.org)    National [place] on Mental Illness (www.joe.org): 249.489.1008 or 612-601-8733.     Mental Health Association (www.mentalhealth.org): 240.274.2021 or 638-552-4510.    [place] Crisis Text Line: Text [place] to 877544    Suicide LifeLine Chat: suicidepreop5line.org/chat
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
## 2021-06-07 NOTE — PROGRESS NOTES
This is a recent snapshot of the patient's [facility] medical record.  For current drug dose and complete information and questions, call [phone] or In Basket pool, fv home infusion ([id number])  CSN Number:  [id number]

## 2021-06-08 NOTE — PROGRESS NOTES
Hilaria is a 30 year old who is being evaluated via a billable telephone visit.      What phone number would you like to be contacted at? [phone]  How would you like to obtain your AVS? Ferdinand Mcdonough CMA  [facility]      [facility]     Date of visit: 6/10/2021      Assessment:   Hilaria Bonner is a 30 year old female with a past medical history significant for PE with associated PEA arrest, morbid obesity s/p sleeve gastrectomy, pancreatitis, and recent COVID 19 who presents with complaints of upper and lower extremity pain.      1. Upper and lower extremity pain- etiology may be an underlying autoimmune condition triggered by COVID polyneuritis, undergoing evaluation and treatment with neurology.  2. Alcohol dependence: Has referral to see addiction medicine.    3. Mental Health - the patient's mental health concerns, specifically anxiety, affect her experience of pain and contribute to her clinically significant distress.    Visit Diagnoses:  1. Chronic inflammatory demyelinating polyneuropathy (H)    2. Pain syndrome, chronic        Plan:     1.  Pain Physical Therapy:     YES   Would recommend restarting physical therapy once she receives approval from neurosurgery- Thad TOVAR.    2.  Pain Psychologist to address relaxation, behavioral change, coping style, and other factors important to improvement.    Continue to work with psychiatry, Dr. Matute.   Establish with health psychologist, providers listed below.   Would recommend having a consult with neuropsychiatry as recommended by Dr. Matute.    3.  Medication Management:     1. Decrease in Cymbalta to 90mg did not cause an increase in pain. We will continue trying to reduce/eliminate medications that are not helping. Decrease Cymbalta to 60mg daily. She will monitor pain. If not worsening, we will likely decrease to 30mg and then stop.    2. Discussed trying one more  topical- a compounded pain cream. Diclofenac 5%, Baclofen 2%, Bupivicaine 1%, Ibuprofen 3%, Pentoxifylline 3%- apply 1-2g 3-4x daily as needed for pain. If this is not covered by insurance or too expensive, do not recommend additional creams.    3. Can continue tizanidine 4mg BID prn for now.     4. Continue medical cannabis- advised she discuss with pharmacist at the dispensary if they have any recommendations.     5. I will discuss with Dr. Matute about adding on a different antidepressant- specifically considering transition to Effexor.     4.  Potential procedures: not at this time.      5.  Referrals: addiction medicine referral placed back in February/March. Hilaria never made appointment as she feels she has been doing well but we discussed the importance of maintaining sobriety. I would recommend having a consult with addiction medicine provider and she will consider.    6.  Follow up with RG Puri CNP in 6-8 weeks.     Janki DIEZ CNP  [facility]     -------------------------------------------------    Subjective:    Chief complaint:   Chief Complaint   Patient presents with     Pain       Interval history:  Hilaria Bonner is a 30 year old female last seen on 5/6/2021.  She is seen in follow up.     Recommendations/plan at the last visit included:   1.  Pain Physical Therapy:    Continue rehab physical therapy as planned- advised she focus on her back as this sounds like it has been a major issue.    2.  Pain Psychologist to address relaxation, behavioral change, coping style, and other factors important to improvement.     YES  Continue regular sessions with Jasmin Prince PsyD, LP as planned.    3.  Medication Management:     1. Hilaria reports minimal pain benefit with current medication regimen. We will try to eliminate medications that are not helping. Decrease Cymbalta to 90mg daily. If no increase in pain we will continue to decrease until off.  Advised she call right away if she thinks pain is worsening.    2. Hilaria inquires about medication management of acute back pain. States neurosurgery advised she contact me for recommendations. I have not seen her to discuss this complaint but a different muscle relaxant may be helpful. Discontinue Robaxin. Try tizanidine 2-4mg TID prn for muscle spasm/back pain. Caution- this can be sedating and slow down your bowels.     3 . Advised she reach out to dispensary to adjust medical cannabis to only yellow (or 50-50 products) as these seem to help some without side effects.    4.  Potential procedures: not at this time.     5.  Referrals: advised she continue follow up with Dr. Chua with Neurology. Go to follow up appointment with Thad TOVAR. .   6.  Follow up with RG Puri CNP in 6-8 weeks. We may only have another 1-2 visits if our progress continues to be limited.       Since her last visit, Hilaria TORRES Bonner reports:  -Her pain is about the same as it was at last visit.  -She decreased Cymbalta to 90mg daily as directed and hasn't noticed any significant difference. She doesn't think that this hasn't been helping much with pain.   -She discontinued Robaxin as directed and started tizanidine BID, usually at bedtime. Denies pain benefit with this medication and has muscle spasms.   -She hasn't been able to use medical cannabis regularly in a while. She recently did get some money and plans to purchase new products. She has had some success with anxiety/sleep with the yellow product.   -She continues to recover from thoracic compression fracture. She has been wearing her TLSO brace and has follow up with neurosurgery tomorrow.   -She had follow up with Neurology, Dr. Chua, and discussed increased pain/fequency of falls. He increased frequency of IVIG treatments for a while, will taper off on in the future. She notes her pain was worse with more muscle spasms when she was having treatment every 4  "weeks.   -Physical therapy was paused given acute compression fracture.   -She was referred to Addiction Medicine but never scheduled an appointment. States she doesn't feel like she needs help and has only had one slip up.      Current pain medications:               Lyrica 200mg TID- [facility], \"a little bit,\" more effective than gabapentin                Cymbalta 90mg daily- ?, decreased to this dose without increase in pain              Hydroxyzine 25-50mg prn- H for anxiety, ran out of              Folate 1mg daily                       Medical cannabis (starter kit- CBD dominant capsules/vapor, CBD/THC capsules/pills, and THC dominant capsules/vapor) - H with sleep but not pain              tizanidine 4mg TID prn- [facility], taking BID              Zyprexa 5mg at bedtime prn- ?, Seroquel seemed somewhat more effective   Prazosin 1mg at bedtime- [facility], sleeping better    Current MME: 0    Review of [place] Prescription Monitoring Program (): No concern for abuse or misuse of controlled medications based on this report.     Annual Controlled Substance Agreement/UDS due date: NA    Past pain treatments:  1. Previous Pain Relevant Medications:              Opiates: Tramadol- [facility], oxycodone- ?/[facility]              NSAIDS: doesn't take anti-inflammatories due to gastric bypass              Muscle Relaxants: tizanidine- H for sleep only, Robaxin- NH              Anti-migraine mediations: no              Anti-depressants: amitriptyline (up to 75mg)- ?, \"it put me to sleep\", Cymbalta- ?, Wellbutrin- NH              Sleep aids: no              Anxiolytics: hydroxyzine- H               Neuropathics: Lyrica- [facility], gabapentin (started on for numbness in toes and migraines after cardiac arrest)- SE, fatigue, Topamax- H for weight loss, NH for pain              Topicals: gabapentin cream- NH/[facility], lidocaine cream- NH, W, burning, capsaicin cream- NH, W, burning              Other medications not covered above: Tylenol- [place]     2. Physical " Therapy: going to neuro PT and OT   3. Pain Psychology: yes Jasmin Prince PsyD LP- [facility]  4. Surgery: gastric bypass March 2019  5. Injections: no  6. Chiropractic: no  7. Acupuncture: yes x4 sessions with José Kunz DC - [place]  8. TENS Unit: yes- [place], still uses on a regular basis.     Medications:  Current Outpatient Medications   Medication Sig Dispense Refill     Blood Pressure Monitoring (BLOOD PRESSURE MONITOR/ARM) BRAYAN        COMPOUNDED NON-CONTROLLED SUBSTANCE (CMPD RX) - PHARMACY TO MIX COMPOUNDED MEDICATION Apply 1-2 g topically 3 times daily as needed (pain) Apply three times daily as needed for pain. 120 g 1     diphenhydrAMINE (BENADRYL) 50 MG/ML injection        DULoxetine (CYMBALTA) 30 MG capsule Take 1 capsule (30 mg) by mouth daily 30 capsule 1     DULoxetine (CYMBALTA) 60 MG capsule Take 1 capsule (60 mg) by mouth daily 30 capsule 1     folic acid (FOLVITE) 1 MG tablet Take 1 tablet (1 mg) by mouth daily 30 tablet 11     lisinopril (ZESTRIL) 10 MG tablet Take 1 tablet (10 mg) by mouth daily 90 tablet 0     Multiple Vitamins-Minerals (MULTIVITAMIN ADULT) CHEW Take 1 chew tab by mouth 2 times daily 60 tablet 11     [facility] CANABIS       OLANZapine (ZYPREXA) 5 MG tablet Take 1 tablet (5 mg) by mouth At Bedtime 30 tablet 1     omeprazole (PRILOSEC) 20 MG DR capsule Take 1 capsule (20 mg) by mouth daily 28 capsule 11     ondansetron (ZOFRAN-ODT) 4 MG ODT tab Take 1 tablet (4 mg) by mouth every 8 hours as needed for nausea 30 tablet 1     polyethylene glycol (MIRALAX) 17 GM/SCOOP powder Take 17 g (1 capful) by mouth daily 850 g 3     potassium chloride (KLOR-CON) 20 MEQ packet Take 20 mEq by mouth 2 times daily 60 packet 3     prazosin (MINIPRESS) 1 MG capsule Take 1 capsule (1 mg) by mouth At Bedtime 30 capsule 1     Pregabalin (LYRICA) 200 MG capsule Take 1 capsule (200 mg) by mouth 3 times daily 90 capsule 3     PRIVIGEN 20 GM/200ML SOLN        PRIVIGEN 40 GM/400ML SOLN        rivaroxaban  ANTICOAGULANT (XARELTO ANTICOAGULANT) 20 MG TABS tablet Take 1 tablet (20 mg) by mouth daily (with dinner) 90 tablet 0     tiZANidine (ZANAFLEX) 2 MG tablet Take 1-2 tablets (2-4 mg) by mouth 3 times daily as needed for muscle spasms 50 tablet 1     vitamin (B COMPLEX) tablet Take 1 tablet by mouth daily 90 tablet 3     vitamin C (ASCORBIC ACID) 1000 MG TABS Take 1 tablet (1,000 mg) by mouth daily 30 tablet 0     vitamin D3 (CHOLECALCIFEROL) 2000 units (50 mcg) tablet Take 1 tablet (2,000 Units) by mouth daily 30 tablet 0       Medical History: any changes in medical history since they were last seen? No    Review of Systems: A 10-point review of systems was negative, with the exception of chronic pain issues and numbness and tingling.     Objective:      Extreme Pain (9)    Phone call duration: 28 minutes    BILLING TIME DOCUMENTATION:   The total TIME spent on this patient on the date of the encounter/appointment was 40 minutes.      TOTAL TIME includes:   Time spent preparing to see the patient (reviewing records and tests)   Time spent face to face (or over the phone) with the patient   Time spent ordering tests, medications, procedures and referrals   Time spent Referring and communicating with other healthcare professionals   Time spent documenting clinical information in Epic

## 2021-06-10 ENCOUNTER — TELEPHONE (OUTPATIENT)
Dept: PALLIATIVE MEDICINE | Facility: CLINIC | Age: 31
End: 2021-06-10

## 2021-06-10 ENCOUNTER — VIRTUAL VISIT (OUTPATIENT)
Dept: PALLIATIVE MEDICINE | Facility: CLINIC | Age: 31
End: 2021-06-10
Payer: COMMERCIAL

## 2021-06-10 DIAGNOSIS — G61.81 CHRONIC INFLAMMATORY DEMYELINATING POLYNEUROPATHY (H): Primary | ICD-10-CM

## 2021-06-10 DIAGNOSIS — G89.4 PAIN SYNDROME, CHRONIC: ICD-10-CM

## 2021-06-10 PROCEDURE — 99215 OFFICE O/P EST HI 40 MIN: CPT | Mod: 95 | Performed by: NURSE PRACTITIONER

## 2021-06-10 ASSESSMENT — PAIN SCALES - GENERAL: PAINLEVEL: EXTREME PAIN (9)

## 2021-06-10 NOTE — CONFIDENTIAL NOTE
Received communication from psychiatry- Dr. Matute, would be reasonable to try transition from Cymbalta to Effexor. Would she be interested in this?    RG Puri [facility] [place] [place] [facility]

## 2021-06-10 NOTE — PATIENT INSTRUCTIONS
1.  Pain Physical Therapy:     YES   Would recommend restarting physical therapy once get approval from Thad TOVAR.    2.  Pain Psychologist to address relaxation, behavioral change, coping style, and other factors important to improvement.    Continue to work with Dr. Matute.   Establish with health psychologist, providers listed below.   Would recommend having a consult with neuropsychiatry as recommended by Dr. Matute.    3.  Medication Management:     1. Decrease Cymbalta to 60mg daily. Monitor pain. If not worsening, we will likely decrease to 30mg and then stop.    2. Lets try a compounded pain cream. If this is not covered by insurance or too expensive, do not recommend additional creams.    3. Can continue tizanidine as needed for now.    4. Continue medical cannabis- discuss with pharmacist if they have any recommendations.     5. I will discuss with Dr. Matute about adding on a different antidepressant. I will let you know her thoughts.     4.  Potential procedures: not at this time.      5.  Referrals: Would recommend having a consult with addiction medicine provider.    6.  Follow up with RG Puri CNP in 6-8 weeks.     Health Psychologists see people who have chronic health diagnoses and typically do not see folks who have severe psychotic disorders, bipolar disorder, or personality disorders.     Please select a provider from the following list and call to request an appointment.     Health Psychologists in the [facility] [place]   Gaye Roe, Ph.D., L.P. [phone]   Dee Lindsey Ph.D. [phone]   Yessy Emerson, Ph.D., L.P. [phone]             Channing Martinez, Ph.D., A.B.P.P., L.P. [phone]   Michelle Early, Ph.D., L.P. [phone]     If you want to read about any of us:   [email or web address]   [email or web address]   [email or web address]    [email or web address]     [email or web address]         ----------------------------------------------------------------  Clinic Number:  [phone]     Call with any questions about your care and for scheduling assistance.     Calls are returned Monday through Friday between 8 AM and 4:30 PM. We usually get back to you within 2 business days depending on the issue/request.    If we are prescribing your medications:    For opioid medication refills, call the clinic or send a Ogin message 7 days in advance.  Please include:    Name of requested medication    Name of the pharmacy.    For non-opioid medications, call your pharmacy directly to request a refill. Please allow 3-4 days to be processed.     Per [place] State Law:    All controlled substance prescriptions must be filled within 30 days of being written.      For those controlled substances allowing refills, pickup must occur within 30 days of last fill.      We believe regular attendance is key to your success in our program!      Any time you are unable to keep your appointment we ask that you call us at least 24 hours in advance to cancel.This will allow us to offer the appointment time to another patient.     Multiple missed appointments may lead to dismissal from the clinic.

## 2021-06-10 NOTE — Clinical Note
Se Matute,    I see Hilaria for her chronic pain. Unfortunately we haven't been able to make much progress despite working together for quite a while now. I think her picture is complex with mental health, alcohol abuse, and chronic pain contributing.  Cymbalta doesn't seem to be helping and so we have been slowly decreasing. I was considering a cross tapering and transitioning to Effexor to see if we have any benefit there but wanted your input. Do you think reasonable to try or do you have other suggestions? I appreciate your help.    Janki

## 2021-06-10 NOTE — TELEPHONE ENCOUNTER
Patient called and stated she can not afford the COMPOUNDED NON-CONTROLLED SUBSTANCE (CMPD RX) - PHARMACY TO MIX COMPOUNDED MEDICATION          Routing to nursing to review         Prabha Ellis    [facility]

## 2021-06-11 ENCOUNTER — OFFICE VISIT (OUTPATIENT)
Dept: NEUROSURGERY | Facility: CLINIC | Age: 31
End: 2021-06-11
Payer: COMMERCIAL

## 2021-06-11 ENCOUNTER — OFFICE VISIT (OUTPATIENT)
Dept: FAMILY MEDICINE | Facility: CLINIC | Age: 31
End: 2021-06-11
Payer: COMMERCIAL

## 2021-06-11 ENCOUNTER — TELEPHONE (OUTPATIENT)
Dept: PSYCHIATRY | Facility: CLINIC | Age: 31
End: 2021-06-11

## 2021-06-11 VITALS
DIASTOLIC BLOOD PRESSURE: 85 MMHG | SYSTOLIC BLOOD PRESSURE: 128 MMHG | WEIGHT: 272.1 LBS | OXYGEN SATURATION: 98 % | TEMPERATURE: 98.3 F | RESPIRATION RATE: 16 BRPM | HEART RATE: 121 BPM | BODY MASS INDEX: 42.62 KG/M2

## 2021-06-11 VITALS — DIASTOLIC BLOOD PRESSURE: 82 MMHG | SYSTOLIC BLOOD PRESSURE: 121 MMHG | HEART RATE: 111 BPM | RESPIRATION RATE: 16 BRPM

## 2021-06-11 DIAGNOSIS — F29 PSYCHOSIS, UNSPECIFIED PSYCHOSIS TYPE (H): Primary | ICD-10-CM

## 2021-06-11 DIAGNOSIS — E87.6 HYPOKALEMIA: ICD-10-CM

## 2021-06-11 DIAGNOSIS — S22.000A COMPRESSION FRACTURE OF THORACIC VERTEBRA, INITIAL ENCOUNTER, UNSPECIFIED THORACIC VERTEBRAL LEVEL: Primary | ICD-10-CM

## 2021-06-11 DIAGNOSIS — S22.070D COMPRESSION FRACTURE OF T9 VERTEBRA WITH ROUTINE HEALING, SUBSEQUENT ENCOUNTER: Primary | ICD-10-CM

## 2021-06-11 DIAGNOSIS — I10 BENIGN ESSENTIAL HYPERTENSION: ICD-10-CM

## 2021-06-11 DIAGNOSIS — E66.01 MORBID OBESITY (H): ICD-10-CM

## 2021-06-11 DIAGNOSIS — F41.1 GAD (GENERALIZED ANXIETY DISORDER): ICD-10-CM

## 2021-06-11 DIAGNOSIS — F32.1 MODERATE MAJOR DEPRESSION (H): ICD-10-CM

## 2021-06-11 DIAGNOSIS — F29 PSYCHOSIS, UNSPECIFIED PSYCHOSIS TYPE (H): ICD-10-CM

## 2021-06-11 DIAGNOSIS — R06.83 SNORING: ICD-10-CM

## 2021-06-11 DIAGNOSIS — G54.9 SENSORY GANGLIONOPATHY: ICD-10-CM

## 2021-06-11 LAB
ANION GAP SERPL CALCULATED.3IONS-SCNC: 6 MMOL/L (ref 3–14)
BUN SERPL-MCNC: 11 MG/DL (ref 7–30)
CALCIUM SERPL-MCNC: 8.7 MG/DL (ref 8.5–10.1)
CHLORIDE SERPL-SCNC: 103 MMOL/L (ref 94–109)
CO2 SERPL-SCNC: 26 MMOL/L (ref 20–32)
CREAT SERPL-MCNC: 0.91 MG/DL (ref 0.52–1.04)
CREAT UR-MCNC: 550 MG/DL
GFR SERPL CREATININE-BSD FRML MDRD: 85 ML/MIN/{1.73_M2}
GLUCOSE SERPL-MCNC: 152 MG/DL (ref 70–99)
MICROALBUMIN UR-MCNC: 53 MG/L
MICROALBUMIN/CREAT UR: 9.58 MG/G CR (ref 0–25)
POTASSIUM SERPL-SCNC: 3.6 MMOL/L (ref 3.4–5.3)
SODIUM SERPL-SCNC: 135 MMOL/L (ref 133–144)

## 2021-06-11 PROCEDURE — 80048 BASIC METABOLIC PNL TOTAL CA: CPT | Performed by: PHYSICIAN ASSISTANT

## 2021-06-11 PROCEDURE — 99213 OFFICE O/P EST LOW 20 MIN: CPT | Performed by: PHYSICIAN ASSISTANT

## 2021-06-11 PROCEDURE — 99214 OFFICE O/P EST MOD 30 MIN: CPT | Performed by: PHYSICIAN ASSISTANT

## 2021-06-11 PROCEDURE — 99000 SPECIMEN HANDLING OFFICE-LAB: CPT | Performed by: PSYCHIATRY & NEUROLOGY

## 2021-06-11 PROCEDURE — 82043 UR ALBUMIN QUANTITATIVE: CPT | Performed by: PHYSICIAN ASSISTANT

## 2021-06-11 PROCEDURE — 86235 NUCLEAR ANTIGEN ANTIBODY: CPT | Performed by: PSYCHIATRY & NEUROLOGY

## 2021-06-11 PROCEDURE — 82784 ASSAY IGA/IGD/IGG/IGM EACH: CPT | Performed by: PSYCHIATRY & NEUROLOGY

## 2021-06-11 PROCEDURE — 86334 IMMUNOFIX E-PHORESIS SERUM: CPT | Performed by: PATHOLOGY

## 2021-06-11 PROCEDURE — 84207 ASSAY OF VITAMIN B-6: CPT | Mod: 90 | Performed by: PSYCHIATRY & NEUROLOGY

## 2021-06-11 PROCEDURE — 36415 COLL VENOUS BLD VENIPUNCTURE: CPT | Performed by: PHYSICIAN ASSISTANT

## 2021-06-11 RX ORDER — LISINOPRIL 10 MG/1
10 TABLET ORAL DAILY
Qty: 90 TABLET | Refills: 0 | Status: SHIPPED | OUTPATIENT
Start: 2021-06-11 | End: 2021-08-13

## 2021-06-11 RX ORDER — TRAMADOL HYDROCHLORIDE 50 MG/1
50 TABLET ORAL EVERY 6 HOURS PRN
Qty: 20 TABLET | Refills: 0 | Status: SHIPPED | OUTPATIENT
Start: 2021-06-11 | End: 2021-07-13

## 2021-06-11 RX ORDER — POTASSIUM CHLORIDE 1500 MG/1
20 TABLET, EXTENDED RELEASE ORAL 2 TIMES DAILY
Qty: 180 TABLET | Refills: 0 | Status: SHIPPED | OUTPATIENT
Start: 2021-06-11 | End: 2021-09-07

## 2021-06-11 ASSESSMENT — PATIENT HEALTH QUESTIONNAIRE - PHQ9
10. IF YOU CHECKED OFF ANY PROBLEMS, HOW DIFFICULT HAVE THESE PROBLEMS MADE IT FOR YOU TO DO YOUR WORK, TAKE CARE OF THINGS AT HOME, OR GET ALONG WITH OTHER PEOPLE: EXTREMELY DIFFICULT
SUM OF ALL RESPONSES TO PHQ QUESTIONS 1-9: 21
SUM OF ALL RESPONSES TO PHQ QUESTIONS 1-9: 21

## 2021-06-11 NOTE — TELEPHONE ENCOUNTER
Will forward to Karie Goodman, APRN RAMSEY to review and advise on anything alternate     Pascual Oneil RN  Patient Care Supervisor   [facility] [place]

## 2021-06-11 NOTE — TELEPHONE ENCOUNTER
Patient being referred to Dr. Torres.    Sheree Matute MD  Collaborative Care Psychiatry  [facility]

## 2021-06-11 NOTE — PROGRESS NOTES
Neurosurgery follow-up    Ms. Bonner is a 30-year-old female returns to clinic for evaluation of her thoracic compression fractures.  She states her sacral pain has resolved.  She continues to have thoracic back pain although it has improved slightly since wearing the brace for the past 4 weeks.  She denies any radicular symptoms denies any numbness in her upper extremities.  She has not had any more falls and she has been doing some physical therapy.    Exam    B/P: 121/82, T: Data Unavailable, P: 111, R: 16     Alert and oriented no acute distress  Thoracic spine tender in multiple areas  Bilateral lower extremities appropriate strength  Gait is normal    Imaging    Thoracic x-ray demonstrates stable appearance of the previously seen compression fractures at T6, T9 and T10, lumbar x-ray without compression fractures.    Assessment    T6, T9, T10 compression fractures.        Plan    I recommend the patient continue to monitor his symptoms and wear the TLSO brace.  She should follow-up in 6 weeks with repeat thoracic x-ray AP and lateral.  Her symptoms are not improved at that time we might consider repeating the thoracic MRI and consider possible procedures such as kyphoplasty.    She can continue with physical therapy, and pain management should be directed by her primary team.    Total time of 30 minutes spent with the patient today in counseling and coordination of care.

## 2021-06-11 NOTE — PATIENT INSTRUCTIONS
Follow up with sleep specialists to rule out sleep apnea.   Take tramadol sparingly as needed for pain.   Follow up with us in clinic prior to any additional refills of tramadol  I will notify you of lab results via [employer]

## 2021-06-11 NOTE — PROGRESS NOTES
Assessment & Plan     Compression fracture of T9 vertebra with routine healing, subsequent encounter  Reviewed MNPMP and no fills of narcotics outside of this system.  Given 20 tablets- will need follow up with us prior to any additional refill.  Is followed by chronic pain clinic   I am not comfortable with narcotic long term  - traMADol (ULTRAM) 50 MG tablet  Dispense: 20 tablet; Refill: 0    Snoring  Follow up with sleep specialist  - SLEEP EVALUATION & MANAGEMENT REFERRAL - ADULT -[facility] [place]  [phone] (Age 15 and up)    Benign essential hypertension  Blood pressure at goal.  Continue current medication  - lisinopril (ZESTRIL) 10 MG tablet  Dispense: 90 tablet; Refill: 0  - Albumin Random Urine Quantitative with Creat Ratio    Hypokalemia  Normal potassium while taking potassium - did not like size of pills so we had switched to powder but didn't like that either and prefers tablets  - Basic metabolic panel  - potassium chloride ER (KLOR-CON M) 20 MEQ CR tablet  Dispense: 180 tablet; Refill: 0    Sensory ganglionopathy  Followed by neurology- has been getting infusion  - Vitamin B6  - SSB La KELLY Antibody IgG  - SSA Ro KELLY Antibody IgG  - Protein Immunofixation Serum    Moderate major depression (H)  Followed by pscychiatry- has been referred to neuropsychiatry    Psychosis, unspecified psychosis type (H)  As above     MELODY (generalized anxiety disorder)  As above     Morbid obesity (H)  Unable to exercise- encouraged to work on diet       Review of the result(s) of each unique test - bmp, microalbumin  Ordering of each unique test  Prescription drug management         Patient Instructions   Follow up with sleep specialists to rule out sleep apnea.   Take tramadol sparingly as needed for pain.   Follow up with us in clinic prior to any additional refills of tramadol  I will notify you of lab results via [employer]         Return in about 4 weeks (around 7/9/2021), or if symptoms  worsen or fail to improve, for in person.    KATTY Brice [facility] CHAUNCEY Manrique is a 30 year old who presents for the following health issues  accompanied by her son:    HPI     Hypertension Follow-up      Do you check your blood pressure regularly outside of the clinic? Yes     Are you following a low salt diet? Yes    Are your blood pressures ever more than 140 on the top number (systolic) OR more   than 90 on the bottom number (diastolic), for example 140/90? Yes    Depression and Anxiety Follow-Up    How are you doing with your depression since your last visit? Worsened     How are you doing with your anxiety since your last visit?  Worsened     Are you having other symptoms that might be associated with depression or anxiety? No    Have you had a significant life event? Health Concerns     Do you have any concerns with your use of alcohol or other drugs? No    Social History     Tobacco Use     Smoking status: Current Every Day Smoker     Packs/day: 0.25     Years: 1.00     Pack years: 0.25     Types: Cigarettes     Start date: 11/20/2016     Smokeless tobacco: Never Used   Substance Use Topics     Alcohol use: Not Currently     Comment: Quit drinking when last hospitalized     Drug use: Yes     Types: Marijuana     Comment: medical marijuana pills and vape.     PHQ 4/22/2021 6/7/2021 6/11/2021   PHQ-9 Total Score 17 8 21   Q9: Thoughts of better off dead/self-harm past 2 weeks Not at all Not at all Not at all   PHQ-A Total Score - - -   PHQ-A Mood affect on daily activities - - -   PHQ-A Suicide Ideation past 2 weeks - - -     MELODY-7 SCORE 3/22/2021 4/22/2021 6/7/2021   Total Score - - 10 (moderate anxiety)   Total Score 14 18 10     Last PHQ-9 6/11/2021   1.  Little interest or pleasure in doing things 3   2.  Feeling down, depressed, or hopeless 3   3.  Trouble falling or staying asleep, or sleeping too much 3   4.  Feeling tired or having little energy 3    5.  Poor appetite or overeating 1   6.  Feeling bad about yourself 3   7.  Trouble concentrating 3   8.  Moving slowly or restless 2   Q9: Thoughts of better off dead/self-harm past 2 weeks 0   PHQ-9 Total Score 21   Difficulty at work, home, or with people -     MELODY-7  6/7/2021   1. Feeling nervous, anxious, or on edge 1   2. Not being able to stop or control worrying 1   3. Worrying too much about different things 1   4. Trouble relaxing 2   5. Being so restless that it is hard to sit still 2   6. Becoming easily annoyed or irritable 1   7. Feeling afraid, as if something awful might happen 2   MELODY-7 Total Score 10   If you checked any problems, how difficult have they made it for you to do your work, take care of things at home, or get along with other people? -       Suicide Assessment Five-step Evaluation and Treatment (SAFE-T)      How many servings of fruits and vegetables do you eat daily?  2-3    On average, how many sweetened beverages do you drink each day (Examples: soda, juice, sweet tea, etc.  Do NOT count diet or artificially sweetened beverages)?   0    How many days per week do you exercise enough to make your heart beat faster? none    How many minutes a day do you exercise enough to make your heart beat faster? none    How many days per week do you miss taking your medication? 0      Worried about blood pressure - more times higher than low  Dr. Chua has placed orders for labs   Came from back doctor - compression fracture but couldn't get any pain medications from ortho .  Is working with Pain clinic but this is a new problem and reports that they won't prescribe pain medications either since pain not related to neuropathy  Fell asleep on couch and woke up and back in pain- didn't go away- phone visit and provider ordered xrays and then referred to specialist - compression fractures and back therapy and wearing brace and see me back in 6 weeks today-ordered MRI and 2 special xrays   Still has  compression fracture  Reports follow up with in 6 weeks and if not healed will treat with cement and balloon.  Reports advised Usually heal in 3 months-   Boyfriend and kids taking care of me  Not feeling rested in AM- not new- but just prescribed new med takes longer to go sleep.  Toss and turns all night - concerned about sleep apnea    Wt Readings from Last 4 Encounters:   06/11/21 123.4 kg (272 lb 1.6 oz)   04/09/21 116 kg (255 lb 11.2 oz)   04/01/21 118.8 kg (262 lb)   01/14/21 114.8 kg (253 lb)     Has a PCA 2 hours every day to help with showers- prep dinner etc  Even though taking all other medications not helpful  Pain Was down to tailbone- history of alcohol abuse   Not seen addiction specialist- doeing well on own and declines to work with addiction specialist  Not talking with therapist - but has been referred- is followed by psychiatry     Still smoking 1/4 ppd - declines assistance   Still has severe pain and numbness of extremities and followed by neurology  Review of Systems   Constitutional, HEENT, cardiovascular, pulmonary, gi and gu systems are negative, except as otherwise noted.      Objective    /85   Pulse 121   Temp 98.3  F (36.8  C) (Oral)   Resp 16   Wt 123.4 kg (272 lb 1.6 oz)   SpO2 98%   BMI 42.62 kg/m    Body mass index is 42.62 kg/m .  Physical Exam   GENERAL: alert, no distress and obese  NECK: no adenopathy, no asymmetry, masses, or scars and thyroid normal to palpation  RESP: lungs clear to auscultation - no rales, rhonchi or wheezes  CV: regular rate and rhythm, normal S1 S2, no S3 or S4, no murmur, click or rub, no peripheral edema and peripheral pulses strong  MS: wearing brace of back- removed and tender diffuse thoracic spine. Normal gait   PSYCH: mentation appears normal, affect flat, anxious, judgement and insight intact and appearance well groomed                Answers for HPI/ROS submitted by the patient on 6/11/2021   If you checked off any problems, how  difficult have these problems made it for you to do your work, take care of things at home, or get along with other people?: Extremely difficult  PHQ9 TOTAL SCORE: 21

## 2021-06-11 NOTE — TELEPHONE ENCOUNTER
Karie Goodman APRN CNP 22 hours ago (3:58 PM)        Received communication from psychiatry- Dr. Matute, would be reasonable to try transition from Cymbalta to Effexor. Would she be interested in this?     RG Puri CNP  [facility] Pain Management            Call back to Pt, Pt is interested Effexor.      Please send to Evan on [address],     Are there any special instructions on going off the Cymbalta and starting on the Effexor?    Pascual Oneil, TEN  Patient Care Supervisor   [facility]

## 2021-06-11 NOTE — LETTER
6/11/2021         RE: Hilaria Bonner  [address]  [address]  [facility] [address]        Dear Colleague,    Thank you for referring your patient, Hilaria Bonner, to the [facility] [place]. Please see a copy of my visit note below.    Neurosurgery follow-up    Ms. Bonner is a 30-year-old female returns to clinic for evaluation of her thoracic compression fractures.  She states her sacral pain has resolved.  She continues to have thoracic back pain although it has improved slightly since wearing the brace for the past 4 weeks.  She denies any radicular symptoms denies any numbness in her upper extremities.  She has not had any more falls and she has been doing some physical therapy.    Exam    B/P: 121/82, T: Data Unavailable, P: 111, R: 16     Alert and oriented no acute distress  Thoracic spine tender in multiple areas  Bilateral lower extremities appropriate strength  Gait is normal    Imaging    Thoracic x-ray demonstrates stable appearance of the previously seen compression fractures at T6, T9 and T10, lumbar x-ray without compression fractures.    Assessment    T6, T9, T10 compression fractures.        Plan    I recommend the patient continue to monitor his symptoms and wear the TLSO brace.  She should follow-up in 6 weeks with repeat thoracic x-ray AP and lateral.  Her symptoms are not improved at that time we might consider repeating the thoracic MRI and consider possible procedures such as kyphoplasty.    She can continue with physical therapy, and pain management should be directed by her primary team.    Total time of 30 minutes spent with the patient today in counseling and coordination of care.        Again, thank you for allowing me to participate in the care of your patient.        Sincerely,        Thad Vázquez PA-C

## 2021-06-11 NOTE — CONFIDENTIAL NOTE
We had decided to try the compounded cream with the understanding that it would likely not be covered and may be too expensive. Unfortunately she has tried most of the alternatives that I would recommend. No new recommendations today.    RG Puri [facility] Pain Management

## 2021-06-12 ASSESSMENT — PATIENT HEALTH QUESTIONNAIRE - PHQ9: SUM OF ALL RESPONSES TO PHQ QUESTIONS 1-9: 21

## 2021-06-12 NOTE — RESULT ENCOUNTER NOTE
Dear Marti  Your urine does not show excess protein.   This is much improved from previous.   Your blood sugar was 152.  Your potassium and kidney function were normal. Continue the potassium supplement as prescribed.   Please call or MyChart my office with any questions or concerns.   Crissy Madrigal, PAC

## 2021-06-13 PROBLEM — S22.070D: Status: ACTIVE | Noted: 2021-06-13

## 2021-06-14 LAB
ENA SS-A IGG SER IA-ACNC: 0.2 AI (ref 0–0.9)
ENA SS-B IGG SER IA-ACNC: <0.2 AI (ref 0–0.9)
IGA SERPL-MCNC: 195 MG/DL (ref 84–499)
IGG SERPL-MCNC: 1917 MG/DL (ref 610–1616)
IGM SERPL-MCNC: 93 MG/DL (ref 35–242)
PROT PATTERN SERPL IFE-IMP: ABNORMAL

## 2021-06-14 RX ORDER — VENLAFAXINE 37.5 MG/1
75 TABLET ORAL 2 TIMES DAILY
Qty: 60 TABLET | Refills: 1 | Status: SHIPPED | OUTPATIENT
Start: 2021-06-14 | End: 2021-09-02 | Stop reason: DRUGHIGH

## 2021-06-14 NOTE — TELEPHONE ENCOUNTER
Okay great. At last visit I had her decrease Cymbalta to 60mg. After 10 days at this dose, she can decrease to Cymbalta 30mg and start Effexor 37.5mg . If this goes well, after 2 weeks she can discontinue Cymbalta and increased Effexor to 75mg daily. She should call with any issues.    Signed Prescriptions:                        Disp   Refills    venlafaxine (EFFEXOR) 37.5 MG tablet       60 tab*1        Sig: Take 2 tablets (75 mg) by mouth 2 times daily  Authorizing Provider: GLO LOZA, RG [facility] Management

## 2021-06-14 NOTE — TELEPHONE ENCOUNTER
Called and LM for pt to call back to discuss. Also see TE 06/10 regarding transition from Cymbalta to effexor    Kait CASSIDY, RN Care Coordinator  [facility]  Pain Management

## 2021-06-14 NOTE — TELEPHONE ENCOUNTER
Called pt. LM to call back to discuss. Also see TE 06/10 regarding compounded medication.     Kait CASSIDY, RN Care Coordinator  [facility]  Pain [facility]

## 2021-06-14 NOTE — TELEPHONE ENCOUNTER
Called pt. Discussed medication directions below in detail, pt wrote down and read back instructions. She will call with questions.     She wanted to FYI to provider that she started Privigen IV once every 3 weeks/27days now. Close when reviewed      Kait CASSIDY, RN Care Coordinator  [facility]  Pain Management

## 2021-06-15 ENCOUNTER — HOME INFUSION (PRE-WILLOW HOME INFUSION) (OUTPATIENT)
Dept: PHARMACY | Facility: CLINIC | Age: 31
End: 2021-06-15

## 2021-06-15 LAB — VIT B6 SERPL-MCNC: 114.7 NMOL/L (ref 20–125)

## 2021-06-15 NOTE — PROGRESS NOTES
This is a recent snapshot of the patient's [facility] medical record.  For current drug dose and complete information and questions, call [phone] or In Basket pool, fv home infusion ([id number])  CSN Number:  [id number]

## 2021-06-17 ENCOUNTER — DOCUMENTATION ONLY (OUTPATIENT)
Dept: PHARMACY | Facility: CLINIC | Age: 31
End: 2021-06-17

## 2021-06-17 ENCOUNTER — ALLIED HEALTH/NURSE VISIT (OUTPATIENT)
Dept: NURSING | Facility: CLINIC | Age: 31
End: 2021-06-17
Payer: COMMERCIAL

## 2021-06-17 ENCOUNTER — HOME INFUSION (PRE-WILLOW HOME INFUSION) (OUTPATIENT)
Dept: PHARMACY | Facility: CLINIC | Age: 31
End: 2021-06-17

## 2021-06-17 VITALS
DIASTOLIC BLOOD PRESSURE: 68 MMHG | BODY MASS INDEX: 43.08 KG/M2 | WEIGHT: 275.03 LBS | SYSTOLIC BLOOD PRESSURE: 108 MMHG

## 2021-06-17 DIAGNOSIS — E53.8 VITAMIN B12 DEFICIENCY (NON ANEMIC): Primary | ICD-10-CM

## 2021-06-17 DIAGNOSIS — Z30.9 ENCOUNTER FOR BIRTH CONTROL: ICD-10-CM

## 2021-06-17 PROCEDURE — 96372 THER/PROPH/DIAG INJ SC/IM: CPT

## 2021-06-17 PROCEDURE — 99207 PR NO CHARGE NURSE ONLY: CPT

## 2021-06-17 PROCEDURE — 96372 THER/PROPH/DIAG INJ SC/IM: CPT | Performed by: PHYSICIAN ASSISTANT

## 2021-06-17 RX ADMIN — MEDROXYPROGESTERONE ACETATE 150 MG: 150 INJECTION, SUSPENSION INTRAMUSCULAR at 08:51

## 2021-06-17 RX ADMIN — CYANOCOBALAMIN 1000 MCG: 1000 INJECTION, SOLUTION INTRAMUSCULAR; SUBCUTANEOUS at 08:55

## 2021-06-17 NOTE — PROGRESS NOTES
Skilled Nurse visit in the  patient home to administer Privigen.  No recent elevated temperature, fever, chills, productive cough, coughing for 3 weeks or longer or hemoptysis, abnormal vital signs, night sweats, chest pain. No  decrease in your appetite, unexplained weight loss or fatigue.  No other new onset medical symptoms.  Current weight 255 lbs.  PIV placed in left hand, 1 attempt/s.  Pre medicated with benadryl 50 mg PO, tylenol 650 mg PO, and hydrocortisone 100 mg IV push over 3 minutes. Labs drawn-none. Infusion completed without complication or reaction. Pt reports therapy is somewhat effective in managing symptoms related to therapy. No side effects after infusion and pt reports feeling energized.    Kristi Wilson RN BSN  [facility]  Email: [email or web address]  Phone: [phone]

## 2021-06-17 NOTE — NURSING NOTE
BP: 108/68    LAST PAP/EXAM:   Lab Results   Component Value Date    PAP NIL 09/27/2019     URINE HCG:not indicated    The following medication was given:     MEDICATION: Depo Provera 150mg  ROUTE: IM  SITE: Deltoid - Right  : Amphastar  LOT #: [id number]  EXP:02/01/23  NEXT INJECTION DUE: 9/2/21 - 9/16/21   Provider: Rohan Dickens MA

## 2021-06-18 DIAGNOSIS — G62.9 POLYNEUROPATHY: ICD-10-CM

## 2021-06-18 RX ORDER — FOLIC ACID 1 MG/1
1 TABLET ORAL DAILY
Qty: 30 TABLET | Refills: 11 | Status: SHIPPED | OUTPATIENT
Start: 2021-06-18 | End: 2021-12-20

## 2021-06-18 NOTE — TELEPHONE ENCOUNTER
Rx Authorization:    Requested Medication/ Dose folic acid (FOLVITE) 1 MG tablet    Date last refill ordered: 6/19/20    Quantity ordered: 30    # refills: 11    Date of last clinic visit with ordering provider: 7/29/20    Date of next clinic visit with ordering provider:     All pertinent protocol data (lab date/result):     Include pertinent information from patients message:

## 2021-06-22 ENCOUNTER — TELEPHONE (OUTPATIENT)
Dept: PALLIATIVE MEDICINE | Facility: CLINIC | Age: 31
End: 2021-06-22

## 2021-06-22 ENCOUNTER — HOME INFUSION (PRE-WILLOW HOME INFUSION) (OUTPATIENT)
Dept: PHARMACY | Facility: CLINIC | Age: 31
End: 2021-06-22

## 2021-06-22 NOTE — TELEPHONE ENCOUNTER
Reason for call:  Other   Patient called regarding (reason for call): call back  Additional comments: Pt calling to speak with nursing regarding how she should be taking venlafaxine (EFFEXOR) 37.5 MG tablet and OLANZapine (ZYPREXA) 5 MG tablet. She wants to make sure she has her dosage correct before she goes out of town, she is leaving tomorrow 6/23.    Phone number to reach patient:  Cell number on file:    Telephone Information:   Mobile [phone]       Best Time:  anytime    Can we leave a detailed message on this number?  YES    Travel screening: Not Applicable     Clare MCCARTHY    [facility] Pain Management

## 2021-06-23 NOTE — TELEPHONE ENCOUNTER
Per the 6/10/21 telephone encounter:       10:21 AM 6/14/21     Okay great. At last visit I had her decrease Cymbalta to 60mg. After 10 days at this dose, she can decrease to Cymbalta 30mg and start Effexor 37.5mg . If this goes well, after 2 weeks she can discontinue Cymbalta and increased Effexor to 75mg daily. She should call with any issues.     Signed Prescriptions:                        Disp   Refills    venlafaxine (EFFEXOR) 37.5 MG tablet       60 tab*1        Sig: Take 2 tablets (75 mg) by mouth 2 times daily  Authorizing Provider: GLO LOZA APRN CNP [facility] Management          Called pt.  Informed her to decrease the cymbalta to 30mg and start the effexor on 6/27/21.    Further clarification is needed.  The effexor is written for 75mg BID not 75mg daily.  Exactly how should she start this med on 6/27/21?    Pt informed, that once clarified the instructions would be mycharted to her to review and she is to let us know if she has further questions.    Routed to RG Puri CNP RN-BSN  [facility]

## 2021-06-24 NOTE — TELEPHONE ENCOUNTER
Called Hilaria. Lmom asking for a return call.    Stephenie Gregorio RN  Care Coordinator  [facility] Pain [facility]

## 2021-06-24 NOTE — TELEPHONE ENCOUNTER
I apologize the sig on the prescription was incorrect. She should go ahead and decrease Cymbalta to 30mg and start Effexor 37.5mg for 2 weeks then increase to Effexor 75mg DAILY, not BID. Thank you for clarifying.    RG Puri CNP  [facility] Pain Management

## 2021-06-25 NOTE — TELEPHONE ENCOUNTER
Called Hilaria.  Informed her to start taking the Cymbalta at 30 mg a day and to start the Effexor 37.5 mg a day for 2 weeks.  If there are no issues, she can increase the Effexor to 75 mg a day and stop the Cymbalta.   Pt would also like this information sent to her via [employer].    _________________________________________    [employer] message sent to patient: Good Morning Hilaria,    Here is the information we discussed during our telephone call today.   On Saturday 06/27/2021 start taking the Cymbalta at 30 mg a day and  start the Effexor 37.5 mg ( one tablet) a day for 2 weeks.  If there are no issues, you can increase the Effexor to 75 mg (2 tablets) a day and stop the Cymbalta.   Please call us if you have any questions or concerns. We can be reached at [phone].    Take Stephenie Arcos RN  Care Coordinator  [facility] Pain Management

## 2021-07-01 ENCOUNTER — APPOINTMENT (OUTPATIENT)
Dept: LAB | Facility: CLINIC | Age: 31
End: 2021-07-01
Attending: PSYCHIATRY & NEUROLOGY
Payer: COMMERCIAL

## 2021-07-01 NOTE — PROGRESS NOTES
This is a recent snapshot of the patient's [facility] medical record.  For current drug dose and complete information and questions, call [phone] or In Basket pool, fv home infusion ([id number])  CSN Number:  [id number]

## 2021-07-01 NOTE — PROGRESS NOTES
This is a recent snapshot of the patient's [facility] medical record.  For current drug dose and complete information and questions, call [phone] or In Basket pool, fv home infusion ([id number])  CSN Number:  [id number]

## 2021-07-05 DIAGNOSIS — G61.81 CHRONIC INFLAMMATORY DEMYELINATING POLYNEUROPATHY (H): ICD-10-CM

## 2021-07-05 RX ORDER — DULOXETIN HYDROCHLORIDE 30 MG/1
30 CAPSULE, DELAYED RELEASE ORAL DAILY
Qty: 30 CAPSULE | Refills: 1 | Status: SHIPPED | OUTPATIENT
Start: 2021-07-05 | End: 2021-07-13

## 2021-07-05 NOTE — TELEPHONE ENCOUNTER
Left message on numerical id vm  to inform of approved Rx .  Faith/MA  [facility]

## 2021-07-05 NOTE — TELEPHONE ENCOUNTER
Signed Prescriptions:                        Disp   Refills    DULoxetine (CYMBALTA) 30 MG capsule        30 cap*1        Sig: Take 1 capsule (30 mg) by mouth daily  Authorizing Provider: GLO LOZA APRN [facility]

## 2021-07-05 NOTE — TELEPHONE ENCOUNTER
Received fax from pharmacy requesting refill(s) for DULoxetine (CYMBALTA) 30 MG capsule     Last refilled on 06/03/21    Pt last seen on 06/10/21  Next appt scheduled for None    E-prescribe to:     [id number] - [place], [place] - [address] AT [facility]     Will facilitate refill.      Amanda Joyce MA  [facility] Pain Management [place]

## 2021-07-06 ENCOUNTER — HOME INFUSION (PRE-WILLOW HOME INFUSION) (OUTPATIENT)
Dept: PHARMACY | Facility: CLINIC | Age: 31
End: 2021-07-06

## 2021-07-08 ENCOUNTER — HOME INFUSION (PRE-WILLOW HOME INFUSION) (OUTPATIENT)
Dept: PHARMACY | Facility: CLINIC | Age: 31
End: 2021-07-08

## 2021-07-08 NOTE — PROGRESS NOTES
This is a recent snapshot of the patient's [facility] medical record.  For current drug dose and complete information and questions, call [phone] or In Basket pool, fv home infusion ([id number])  CSN Number:  [id number]

## 2021-07-12 ENCOUNTER — TELEPHONE (OUTPATIENT)
Dept: PALLIATIVE MEDICINE | Facility: CLINIC | Age: 31
End: 2021-07-12

## 2021-07-12 ENCOUNTER — DOCUMENTATION ONLY (OUTPATIENT)
Dept: PHARMACY | Facility: CLINIC | Age: 31
End: 2021-07-12

## 2021-07-12 ENCOUNTER — HOME INFUSION (PRE-WILLOW HOME INFUSION) (OUTPATIENT)
Dept: PHARMACY | Facility: CLINIC | Age: 31
End: 2021-07-12

## 2021-07-12 ENCOUNTER — TELEPHONE (OUTPATIENT)
Dept: FAMILY MEDICINE | Facility: CLINIC | Age: 31
End: 2021-07-12

## 2021-07-12 NOTE — TELEPHONE ENCOUNTER
Called pt- States that she is on her last  Day of Cymbalta. She was calling for the increase in her medication. States she needs the new prescription sent. Explained that she can take 2 of her current dose Effexor to equate to 75mg. When she is almost out of the supply she currently has she can call to request refill of effexor and we can change to 75mg tablets for her. Verbalized understanding    Kait CASSIDY, RN Care Coordinator  [facility]  Pain Management

## 2021-07-12 NOTE — PROGRESS NOTES
This is a recent snapshot of the patient's [facility] medical record.  For current drug dose and complete information and questions, call [phone] or In Basket pool, fv home infusion ([id number])  CSN Number:  [id number]

## 2021-07-12 NOTE — TELEPHONE ENCOUNTER
----- Message from Kristi Wilson RN sent at 7/12/2021 11:21 AM CDT -----  Regarding: Recent fall  HI Crissy,    I just wanted to notify you that Hilaria Bonner fell again yesterday. She had a scrape on her foot and new blister/ abrasion on left great toe. She says her legs just buckled underneath of her. Let me know of any questions.    Thanks,  Kristi Wilson RN N  [facility]  Email: [email or web address]  Phone: [phone]

## 2021-07-12 NOTE — TELEPHONE ENCOUNTER
Reason for call:  Other   Patient called regarding (reason for call): prescription  Additional comments:  Pt requesting an increase in her medication venlafaxine (EFFEXOR) 37.5 MG tablet    Phone number to reach patient:  Home number on file [phone] (home)    Can we leave a detailed message on this number?  YES    Travel screening: Not Applicable         Tegan GALVAN    [facility] [place]

## 2021-07-12 NOTE — PROGRESS NOTES
Skilled Nurse visit in the  patient home to administer [id number]% 60GM DOSE  No recent elevated temperature, fever, chills, productive cough, coughing for 3 weeks or longer or hemoptysis, abnormal vital signs, night sweats, chest pain. No  decrease in your appetite, unexplained weight loss or fatigue.  No other new onset medical symptoms.  Current weight .  PIV placed right hand , 1  attempt.  Pre medicated with Acetaminophen 650 mg by mouth, 30 minutes prior to infusion,  Diphenhydramine 50 mg by mouth, 30 minutes prior to infusion,  Hydrocortisone 100 mg IV push over 2-5 minutes, 30 minutes prior to infusion.   Infusion completed without complication or reaction. Pt reports therapy is effective  in managing symptoms related to therapy.      Love Ribera RN, BSN  [facility]  [phone]  [email or web address]

## 2021-07-13 ENCOUNTER — OFFICE VISIT (OUTPATIENT)
Dept: FAMILY MEDICINE | Facility: CLINIC | Age: 31
End: 2021-07-13
Payer: COMMERCIAL

## 2021-07-13 ENCOUNTER — ANCILLARY PROCEDURE (OUTPATIENT)
Dept: CARDIOLOGY | Facility: CLINIC | Age: 31
End: 2021-07-13
Attending: NURSE PRACTITIONER
Payer: COMMERCIAL

## 2021-07-13 VITALS
DIASTOLIC BLOOD PRESSURE: 70 MMHG | HEART RATE: 102 BPM | OXYGEN SATURATION: 97 % | BODY MASS INDEX: 44.17 KG/M2 | WEIGHT: 282 LBS | RESPIRATION RATE: 16 BRPM | TEMPERATURE: 98.5 F | SYSTOLIC BLOOD PRESSURE: 110 MMHG

## 2021-07-13 DIAGNOSIS — L08.9 SUPERFICIAL SKIN INFECTION: ICD-10-CM

## 2021-07-13 DIAGNOSIS — R00.2 HEART PALPITATIONS: ICD-10-CM

## 2021-07-13 DIAGNOSIS — R60.0 BILATERAL LEG EDEMA: Primary | ICD-10-CM

## 2021-07-13 DIAGNOSIS — S22.070D COMPRESSION FRACTURE OF T9 VERTEBRA WITH ROUTINE HEALING, SUBSEQUENT ENCOUNTER: ICD-10-CM

## 2021-07-13 DIAGNOSIS — R63.5 ABNORMAL WEIGHT GAIN: ICD-10-CM

## 2021-07-13 DIAGNOSIS — F32.1 MODERATE MAJOR DEPRESSION (H): ICD-10-CM

## 2021-07-13 LAB
ANION GAP SERPL CALCULATED.3IONS-SCNC: 6 MMOL/L (ref 3–14)
BUN SERPL-MCNC: 5 MG/DL (ref 7–30)
CALCIUM SERPL-MCNC: 8.7 MG/DL (ref 8.5–10.1)
CHLORIDE BLD-SCNC: 108 MMOL/L
CO2 SERPL-SCNC: 26 MMOL/L (ref 20–32)
CREAT SERPL-MCNC: 0.77 MG/DL
ERYTHROCYTE [DISTWIDTH] IN BLOOD BY AUTOMATED COUNT: 13.8 % (ref 10–15)
GFR SERPL CREATININE-BSD FRML MDRD: >90 ML/MIN/1.73M2
GLUCOSE BLD-MCNC: 110 MG/DL (ref 70–99)
HCT VFR BLD AUTO: 37.8 % (ref 35–47)
HGB BLD-MCNC: 12.3 G/DL (ref 11.7–15.7)
MCH RBC QN AUTO: 36.4 PG (ref 26.5–33)
MCHC RBC AUTO-ENTMCNC: 32.5 G/DL (ref 31.5–36.5)
MCV RBC AUTO: 112 FL (ref 78–100)
NT-PROBNP SERPL-MCNC: 121 PG/ML (ref 0–125)
PLATELET # BLD AUTO: 348 10E3/UL (ref 150–450)
POTASSIUM BLD-SCNC: 3.9 MMOL/L (ref 3.4–5.3)
RBC # BLD AUTO: 3.38 10E6/UL (ref 3.8–5.2)
SODIUM SERPL-SCNC: 140 MMOL/L (ref 133–144)
TSH SERPL DL<=0.005 MIU/L-ACNC: 0.9 MU/L (ref 0.4–4)
WBC # BLD AUTO: 5.9 10E3/UL (ref 4–11)

## 2021-07-13 PROCEDURE — 93242 EXT ECG>48HR<7D RECORDING: CPT

## 2021-07-13 PROCEDURE — 85027 COMPLETE CBC AUTOMATED: CPT | Performed by: NURSE PRACTITIONER

## 2021-07-13 PROCEDURE — 83880 ASSAY OF NATRIURETIC PEPTIDE: CPT | Performed by: NURSE PRACTITIONER

## 2021-07-13 PROCEDURE — 36415 COLL VENOUS BLD VENIPUNCTURE: CPT | Performed by: NURSE PRACTITIONER

## 2021-07-13 PROCEDURE — 96372 THER/PROPH/DIAG INJ SC/IM: CPT | Performed by: PHYSICIAN ASSISTANT

## 2021-07-13 PROCEDURE — 99214 OFFICE O/P EST MOD 30 MIN: CPT | Performed by: NURSE PRACTITIONER

## 2021-07-13 PROCEDURE — 80048 BASIC METABOLIC PNL TOTAL CA: CPT | Performed by: NURSE PRACTITIONER

## 2021-07-13 PROCEDURE — 93244 EXT ECG>48HR<7D REV&INTERPJ: CPT | Performed by: INTERNAL MEDICINE

## 2021-07-13 PROCEDURE — 84443 ASSAY THYROID STIM HORMONE: CPT | Performed by: NURSE PRACTITIONER

## 2021-07-13 RX ORDER — TRAMADOL HYDROCHLORIDE 50 MG/1
50 TABLET ORAL EVERY 6 HOURS PRN
Qty: 20 TABLET | Refills: 0 | Status: SHIPPED | OUTPATIENT
Start: 2021-07-13 | End: 2021-10-12

## 2021-07-13 RX ORDER — CEPHALEXIN 500 MG/1
500 CAPSULE ORAL 2 TIMES DAILY
Qty: 14 CAPSULE | Refills: 0 | Status: SHIPPED | OUTPATIENT
Start: 2021-07-13 | End: 2021-07-20

## 2021-07-13 RX ORDER — HYDROCHLOROTHIAZIDE 12.5 MG/1
12.5 TABLET ORAL DAILY
Qty: 30 TABLET | Refills: 1 | Status: SHIPPED | OUTPATIENT
Start: 2021-07-13 | End: 2021-09-11

## 2021-07-13 RX ADMIN — CYANOCOBALAMIN 1000 MCG: 1000 INJECTION, SOLUTION INTRAMUSCULAR; SUBCUTANEOUS at 15:53

## 2021-07-13 ASSESSMENT — PATIENT HEALTH QUESTIONNAIRE - PHQ9
SUM OF ALL RESPONSES TO PHQ QUESTIONS 1-9: 18
SUM OF ALL RESPONSES TO PHQ QUESTIONS 1-9: 18
10. IF YOU CHECKED OFF ANY PROBLEMS, HOW DIFFICULT HAVE THESE PROBLEMS MADE IT FOR YOU TO DO YOUR WORK, TAKE CARE OF THINGS AT HOME, OR GET ALONG WITH OTHER PEOPLE: EXTREMELY DIFFICULT

## 2021-07-13 NOTE — PROGRESS NOTES
Per Dr. Moseley, patient to have 3 day ziopatch monitor placed.  Diagnosis: heart palpitations  Monitor placed: Yes  Patient Instructed: Yes  Patient verbalized understanding: Yes  Holter # [id number]    Placed By Kimberly GALVAN

## 2021-07-13 NOTE — PATIENT INSTRUCTIONS
PLAN:   1.   Symptomatic therapy suggested: start on water pill once a day  Will start on antibiotic twice a day for a week  OK to put bacitracin on the wound    2.  Orders Placed This Encounter   Medications     traMADol (ULTRAM) 50 MG tablet     Sig: Take 1 tablet (50 mg) by mouth every 6 hours as needed for severe pain     Dispense:  20 tablet     Refill:  0     cephALEXin (KEFLEX) 500 MG capsule     Sig: Take 1 capsule (500 mg) by mouth 2 times daily for 7 days     Dispense:  14 capsule     Refill:  0     hydrochlorothiazide (HYDRODIURIL) 12.5 MG tablet     Sig: Take 1 tablet (12.5 mg) by mouth daily     Dispense:  30 tablet     Refill:  1     Orders Placed This Encounter   Procedures     CBC with platelets     Basic metabolic panel     BNP-N terminal pro     TSH with free T4 reflex     Leadless EKG Monitor 3 to 7 Days       3. Patient needs to follow up in if no improvement,or sooner if worsening of symptoms or other symptoms develop.  Will follow up and/or notify patient of  results via My Chart to determine further need for followup  Holter test   Keep appointment with back doctor as planned   Follow up with Crissy Riley in about a week
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
## 2021-07-13 NOTE — PROGRESS NOTES
This is a recent snapshot of the patient's [facility] medical record.  For current drug dose and complete information and questions, call [phone] or In Basket pool, [facility] ([id number])  CSN Number:  [id number]

## 2021-07-14 ENCOUNTER — VIRTUAL VISIT (OUTPATIENT)
Dept: PSYCHIATRY | Facility: CLINIC | Age: 31
End: 2021-07-14
Payer: COMMERCIAL

## 2021-07-14 ENCOUNTER — VIRTUAL VISIT (OUTPATIENT)
Dept: BEHAVIORAL HEALTH | Facility: CLINIC | Age: 31
End: 2021-07-14
Payer: COMMERCIAL

## 2021-07-14 DIAGNOSIS — G61.81 CHRONIC INFLAMMATORY DEMYELINATING POLYNEUROPATHY (H): ICD-10-CM

## 2021-07-14 DIAGNOSIS — F41.1 GENERALIZED ANXIETY DISORDER: ICD-10-CM

## 2021-07-14 DIAGNOSIS — F29 PSYCHOSIS, UNSPECIFIED PSYCHOSIS TYPE (H): ICD-10-CM

## 2021-07-14 DIAGNOSIS — F32.1 MODERATE MAJOR DEPRESSION (H): Primary | ICD-10-CM

## 2021-07-14 PROCEDURE — 99214 OFFICE O/P EST MOD 30 MIN: CPT | Mod: 95 | Performed by: PSYCHIATRY & NEUROLOGY

## 2021-07-14 PROCEDURE — 90832 PSYTX W PT 30 MINUTES: CPT | Mod: 95 | Performed by: SOCIAL WORKER

## 2021-07-14 RX ORDER — OLANZAPINE 10 MG/1
10 TABLET ORAL AT BEDTIME
Qty: 30 TABLET | Refills: 1 | Status: SHIPPED | OUTPATIENT
Start: 2021-07-14 | End: 2021-09-20

## 2021-07-14 RX ORDER — PRAZOSIN HYDROCHLORIDE 1 MG/1
2 CAPSULE ORAL AT BEDTIME
Qty: 60 CAPSULE | Refills: 2 | Status: SHIPPED | OUTPATIENT
Start: 2021-07-14 | End: 2021-10-06

## 2021-07-14 RX ORDER — VENLAFAXINE 37.5 MG/1
75 TABLET ORAL 2 TIMES DAILY
Qty: 60 TABLET | Refills: 1 | Status: CANCELLED | OUTPATIENT
Start: 2021-07-14

## 2021-07-14 ASSESSMENT — PATIENT HEALTH QUESTIONNAIRE - PHQ9: SUM OF ALL RESPONSES TO PHQ QUESTIONS 1-9: 18

## 2021-07-14 NOTE — RESULT ENCOUNTER NOTE
Aleksey Bonner,    Attached are your test results.  -Normal red blood cell (hgb) levels, normal white blood cell count and normal platelet levels.  -Kidney function (GFR) is normal.  -Sodium is normal.  -Potassium is normal.  -Calcium is normal.  -Glucose is mildly elevated but you were nonfasting and this is okay..  -TSH (thyroid stimulating hormone) level is normal which indicates normal thyroid function.  Heart failure lab is normal which is reassuring    Please contact us if you have any questions.    Kylah Moseley, CNP

## 2021-07-14 NOTE — PATIENT INSTRUCTIONS
Continue olanzapine 5 mg at bedtime until after your appointment with your primary care provider. If weight is back to baseline, increase to 10 mg daily.  Please send me a My Chart message to remind me to check your weight after your appointment.    Check your weight weekly.     Increase prazosin to 2 mg at bedtime for nightmares.     Continue all other medications per your primary care provider.     Expect a call regarding scheduling an appointment with neuropsychiatry.    Continue all other medications per your primary care provider.    Schedule an appointment with me in 6 weeks or sooner as needed.  You may call [facility] at [phone] to schedule.    [facility]:      Go to the Emergency Department as needed or call after hours crisis line at [phone].      To schedule individual or family therapy, call [facility] at [phone].     Follow up with primary care provider as planned or sooner for acute medical concerns.    Call the psychiatric nurse line with medication questions or concerns at [phone].    [id number] may be used to communicate with your provider, but this is not intended to be used for emergencies.    Community Resources:      National Suicide Prevention Lifeline: 674.591.8494 (TTY: 981.510.8841). Call anytime for help.  (www.suicidepreventionlifeline.org)    National [employer] Mental Illness (www.joe.org): 454.542.1880 or 205-879-9898.     Mental Health Association (www.mentalhealth.org): 602.356.6643 or 505-561-7011.    [place] Crisis Text Line: Text [place] to 258444    Suicide LifeLine Chat: suicidepreventionlifeline.org/chat
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
## 2021-07-14 NOTE — PROGRESS NOTES
"Collaborative Care Psychiatry Service (CCPS)  July 14, 2021    Behavioral Health Clinician Progress Note    Patient Name: Hilaria Bonner is a 30 year old female who is being evaluated via a telephone visit.      The patient has been notified of the following:     \"We have found that certain health care needs can be provided without the need for a face to face visit.  This service lets us provide the care you need with a short phone conversation.      I will have full access to your [place] medical record during this entire phone call.   I will be taking notes for your medical record.     Since this is like an office visit, we will bill your insurance company for this service.  Please check with your medical insurance if this type of telephone visit/virtual care is covered.  You may be responsible for the cost of this service if insurance coverage is denied.      There are potential benefits and risks of telephone visits (e.g. limits to patient confidentiality) that differ from in-person visits.?  Confidentiality still applies for telephone services, and nobody will record the visit.  It is important to be in a quiet, private space that is free of distractions (including cell phone or other devices) during the visit.??     If during the course of the call I believe a telephone visit is not appropriate, you will not be charged for this service\"    Consent has been obtained for this service by care team member: yes.           Service Type:  Individual      Service Location:   Phone call (patient / identified key support person reached)     Session Start Time: 12:55p  Session End Time: 1:19p      Session Length: 16 - 37      Attendees: Client    Visit Activities (Refresh list every visit): [facility] Only    Diagnostic Assessment Date: 3/22/21  See Flowsheets for today's PHQ-9 and MELODY-7 results  Previous PHQ-9:   PHQ-9 SCORE 6/11/2021 7/13/2021 7/14/2021   PHQ-9 Total Score MyChart 21 (Severe " "depression) 18 (Moderately severe depression) 18 (Moderately severe depression)   PHQ-9 Total Score 21 18 18   PHQ-A Total Score - - -       Previous MELODY-7:   MELODY-7 SCORE 4/22/2021 6/7/2021 7/14/2021   Total Score - 10 (moderate anxiety) 11 (moderate anxiety)   Total Score 18 10 11       WHODAS  WHODAS 2.0 Total Score 3/22/2021   Total Score 40        AUDIT  No flowsheet data found.    DATA  Extended Session (60+ minutes): No  Interactive Complexity: No  Crisis: No    Medication Compliance:  Yes only took the increased seroquel 2x and then stopped as it didn't help      Chemical Use Review:   Substance Use: Chemical use reviewed, no active concerns identified -pt reports that she has continued to refrain from alcohol use since last session.     Tobacco Use: No change in amount of tobacco use since last session.  Patient declined discussion at this time    Current Stressors / Issues:  Pt shares that there is \"always something new going on\".  Went to [place] this past weekend with family.  Mother pulled over very regularly for pt to get out and walk around due to pt's hx of blood clots.  Pt shares that later that day she noticed her leg/foot was swollen.  She did everything she could to try and reduce swelling but it stayed.    Pt shares that when she returned home on Sunday her legs gave out 3 times and did have one time where she cut her foot.  Ended up going in for an appt yesterday to a provider that she could get in with and was prescribed an antibiotic and put on a water pill.  Is also now on a heart monitor due to her heart rate being high.  Pt shares that she has had a high heart rate for years and nobody seemed to worry about it before.  Pt shares that she feels the heart rate at times but usually when feeling really anxious about going out and doing something.  Has an appt for her back next week.  Feels that since her pulmonary embolism she has had 1 thing after another.    Pt shares that she continues to have " low energy and lack of interest.  Continues with irritability.  Is noticing she has been gaining weight and wonders if it is water weight or from medication she was started last month.  Takes the olanzapine with the trazodone and is still waking up 2-3 times a night and waking up early in the morning.  Falls asleep pretty good but wakes up several times for 15-30 minutes and can then fall asleep but wakes up again later.  Continues to have vivid dreams which she says is usually what wakes her up (9 out of 10 times).  Dreams are vivid and either scary or anxiety provoking.    Pt shares that she was taken off cymbalta yesterday and today started venlafaxine.   Dr. Matute has referred pt to a neuropsychiatrist but pt shares that she has not received any calls.        Review of Symptoms per patient report:  Depression: Lack of interest, Change in energy level, Difficulties concentrating, Irritability and Feeling sad, down, or depressed  Candice:  No Symptoms  Psychosis: reports vivid nightmares  Anxiety: Excessive worry and Social anxiety  Panic:  Palpitations and Shortness of breath  Post Traumatic Stress Disorder:  Nightmares   Eating Disorder: No Symptoms  ADD / ADHD:  No symptoms  Conduct Disorder: No symptoms  Autism Spectrum Disorder: No symptoms  Obsessive Compulsive Disorder: No Symptoms      Changes in Health Issues:   None reported-Continues with health issues and concerns following a heart attack 2 years ago and since having COVID last year.    Assessment: Current Emotional / Mental Status (status of significant symptoms):  Risk status (Self / Other harm or suicidal ideation)  Patient denies a history of suicidal ideation, suicide attempts, self-injurious behavior, homicidal ideation, homicidal behavior and and other safety concerns  Patient denies current fears or concerns for personal safety.  Patient denies current or recent suicidal ideation or behaviors.  Patient denies current or recent homicidal  ideation or behaviors.  Patient denies current or recent self injurious behavior or ideation.  Patient denies other safety concerns.  A safety and risk management plan has not been developed at this time, however patient was encouraged to call [place] [phone] should there be a change in any of these risk factors.    Appearance:   phone visit   Eye Contact:   phone visit   Psychomotor Behavior: phone visit   Attitude:   Cooperative   Orientation:   All  Speech   Rate / Production: Normal    Volume:  Normal   Mood:    Normal  Affect:    Appropriate   Thought Content:  Clear   Thought Form:  Coherent  Logical   Insight:    Good     Diagnoses:  1. Moderate major depression (H)    2. Generalized anxiety disorder        Collateral Reports Completed:  Not Applicable    Plan: (Homework, other):  Patient was given information about behavioral services and encouraged to schedule a follow up appointment with the clinic [facility] in conjunction with next [facility] appointment.  She was also given information about mental health symptoms and treatment options .  CD Recommendations: pt declines any CD resources at this time.  Has been sober for past few months and doesn't feel formal programming is needed at this time. .  ANAYELI Galicia, [facility]      Dana Harrison [facility], [facility]       July 14, 2021

## 2021-07-15 NOTE — PROGRESS NOTES
This is a recent snapshot of the patient's [facility] medical record.  For current drug dose and complete information and questions, call [phone] or In Basket pool, [facility] home infusion ([id number])  CSN Number:  [id number]

## 2021-07-17 DIAGNOSIS — Z98.84 S/P LAPAROSCOPIC SLEEVE GASTRECTOMY: ICD-10-CM

## 2021-07-19 DIAGNOSIS — Z98.84 S/P LAPAROSCOPIC SLEEVE GASTRECTOMY: ICD-10-CM

## 2021-07-19 NOTE — PROGRESS NOTES
This is a recent snapshot of the patient's [facility] medical record.  For current drug dose and complete information and questions, call [phone] or In Basket pool, [facility] home infusion ([id number])  CSN Number:  [id number]

## 2021-07-20 RX ORDER — CHOLECALCIFEROL (VITAMIN D3) 50 MCG
TABLET ORAL
Qty: 30 TABLET | Refills: 0 | Status: SHIPPED | OUTPATIENT
Start: 2021-07-20 | End: 2021-11-15

## 2021-07-20 NOTE — TELEPHONE ENCOUNTER
VITAMIN D 2,000UNIT TABLETS  Last Written Prescription Date:  6/10/20  Last Fill Quantity: 30,   # refills: 0  Last Office Visit : 1/14/21  Future Office visit:  8/11/2021      Refilled for 30 days per protocol.

## 2021-07-21 NOTE — TELEPHONE ENCOUNTER
B-COMPLEX TABLETS    Last Written Prescription Date:  7/14/2020  Last Fill Quantity: 90,   # refills: 3  Last Office Visit : 1/14/2021  Future Office visit:  8/11/2021    Routing refill request to provider for review/approval because:  Drug not on the [facility], P or [facility] refill protocol or controlled substance      Sophia Hernandez RN  Central Triage Red Flags/Med Refills

## 2021-07-22 ENCOUNTER — ANCILLARY PROCEDURE (OUTPATIENT)
Dept: GENERAL RADIOLOGY | Facility: CLINIC | Age: 31
End: 2021-07-22
Attending: PHYSICIAN ASSISTANT
Payer: COMMERCIAL

## 2021-07-22 DIAGNOSIS — S22.000A COMPRESSION FRACTURE OF THORACIC VERTEBRA, INITIAL ENCOUNTER, UNSPECIFIED THORACIC VERTEBRAL LEVEL: ICD-10-CM

## 2021-07-22 PROCEDURE — 72070 X-RAY EXAM THORAC SPINE 2VWS: CPT | Performed by: STUDENT IN AN ORGANIZED HEALTH CARE EDUCATION/TRAINING PROGRAM

## 2021-07-23 ENCOUNTER — OFFICE VISIT (OUTPATIENT)
Dept: NEUROSURGERY | Facility: CLINIC | Age: 31
End: 2021-07-23
Payer: COMMERCIAL

## 2021-07-23 VITALS — SYSTOLIC BLOOD PRESSURE: 112 MMHG | DIASTOLIC BLOOD PRESSURE: 75 MMHG | RESPIRATION RATE: 20 BRPM | HEART RATE: 111 BPM

## 2021-07-23 DIAGNOSIS — S22.000D COMPRESSION FRACTURE OF THORACIC VERTEBRA WITH ROUTINE HEALING, UNSPECIFIED THORACIC VERTEBRAL LEVEL, SUBSEQUENT ENCOUNTER: Primary | ICD-10-CM

## 2021-07-23 PROCEDURE — 99212 OFFICE O/P EST SF 10 MIN: CPT | Performed by: PHYSICIAN ASSISTANT

## 2021-07-23 NOTE — LETTER
7/23/2021         RE: Hilaria Bonner  [address]  [address]  [facility] [place] [address]        Dear Colleague,    Thank you for referring your patient, Hilaria Bonner, to the [facility] [place]. Please see a copy of my visit note below.    Neurosurgery follow-up    Ms. Bonner returns to clinic for 3-month follow-up of her compression fractures.  She states her symptoms are improving some days are more painful than others but overall she is moving in the right direction.  She denies any radicular symptoms.  She has not had any other falls.    Exam    B/P: 112/75, T: Data Unavailable, P: 111, R: 20     Alert and oriented no acute distress  Thoracic spine is mildly tender in the midline in her midthoracic area    Imaging    Thoracic x-rays are stable with increased sclerosis of the T9 compression fracture.  Again seen are compression fractures at T6, and T10    Assessment    Mild thoracic back pain, T6, T10, T9 compression fractures      Plan    I have instructed the patient to wean her thoracic brace.  If she is not improving over the next 3-4 weeks as she begins physical therapy again and increasing her activities, I would recommend she obtain a thoracic MRI and follow-up in clinic.  If she is improving no further follow-up is needed.      Total time of 30 minutes spent with the patient today in counseling and coordination of care.        Again, thank you for allowing me to participate in the care of your patient.        Sincerely,        Thad Vázquez PA-C

## 2021-07-23 NOTE — PROGRESS NOTES
Neurosurgery follow-up    Ms. Bonner returns to clinic for 3-month follow-up of her compression fractures.  She states her symptoms are improving some days are more painful than others but overall she is moving in the right direction.  She denies any radicular symptoms.  She has not had any other falls.    Exam    B/P: 112/75, T: Data Unavailable, P: 111, R: 20     Alert and oriented no acute distress  Thoracic spine is mildly tender in the midline in her midthoracic area    Imaging    Thoracic x-rays are stable with increased sclerosis of the T9 compression fracture.  Again seen are compression fractures at T6, and T10    Assessment    Mild thoracic back pain, T6, T10, T9 compression fractures      Plan    I have instructed the patient to wean her thoracic brace.  If she is not improving over the next 3-4 weeks as she begins physical therapy again and increasing her activities, I would recommend she obtain a thoracic MRI and follow-up in clinic.  If she is improving no further follow-up is needed.      Total time of 30 minutes spent with the patient today in counseling and coordination of care.

## 2021-07-28 ENCOUNTER — HOME INFUSION (PRE-WILLOW HOME INFUSION) (OUTPATIENT)
Dept: PHARMACY | Facility: CLINIC | Age: 31
End: 2021-07-28

## 2021-07-29 ENCOUNTER — HOME INFUSION (PRE-WILLOW HOME INFUSION) (OUTPATIENT)
Dept: PHARMACY | Facility: CLINIC | Age: 31
End: 2021-07-29
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
## 2021-08-02 PROBLEM — Z98.84 S/P LAPAROSCOPIC SLEEVE GASTRECTOMY: Chronic | Status: ACTIVE | Noted: 2021-01-14

## 2021-08-02 PROBLEM — K85.90 ACUTE PANCREATITIS: Status: RESOLVED | Noted: 2018-08-18 | Resolved: 2021-08-02

## 2021-08-02 PROBLEM — F10.10 ALCOHOL ABUSE: Chronic | Status: ACTIVE | Noted: 2021-02-11

## 2021-08-02 PROBLEM — N17.9 AKI (ACUTE KIDNEY INJURY) (H): Status: RESOLVED | Noted: 2019-05-26 | Resolved: 2021-08-02

## 2021-08-02 PROBLEM — S01.01XA SCALP LACERATION, INITIAL ENCOUNTER: Status: RESOLVED | Noted: 2020-02-29 | Resolved: 2021-08-02

## 2021-08-02 PROBLEM — F32.1 MODERATE MAJOR DEPRESSION (H): Chronic | Status: ACTIVE | Noted: 2021-02-11

## 2021-08-02 PROBLEM — F29 PSYCHOSIS, UNSPECIFIED PSYCHOSIS TYPE (H): Status: RESOLVED | Noted: 2021-04-22 | Resolved: 2021-08-02

## 2021-08-02 PROBLEM — J32.9 SINUSITIS: Status: RESOLVED | Noted: 2019-05-26 | Resolved: 2021-08-02

## 2021-08-02 PROBLEM — Z72.0 TOBACCO USE: Chronic | Status: ACTIVE | Noted: 2021-02-06

## 2021-08-02 PROBLEM — F41.1 GAD (GENERALIZED ANXIETY DISORDER): Chronic | Status: ACTIVE | Noted: 2021-04-22

## 2021-08-02 PROBLEM — Z79.899 MEDICAL CANNABIS USE: Chronic | Status: ACTIVE | Noted: 2021-02-06

## 2021-08-02 PROBLEM — E66.01 MORBID OBESITY (H): Chronic | Status: ACTIVE | Noted: 2021-01-14

## 2021-08-02 PROBLEM — E87.1 HYPONATREMIA: Status: RESOLVED | Noted: 2021-02-06 | Resolved: 2021-08-02

## 2021-08-02 PROBLEM — K55.9 ISCHEMIC COLITIS (H): Status: RESOLVED | Noted: 2019-05-26 | Resolved: 2021-08-02

## 2021-08-02 PROBLEM — F41.9 ANXIETY: Chronic | Status: ACTIVE | Noted: 2020-06-05

## 2021-08-02 PROBLEM — G61.81 CHRONIC INFLAMMATORY DEMYELINATING POLYNEUROPATHY (H): Chronic | Status: ACTIVE | Noted: 2020-08-06

## 2021-08-02 PROBLEM — M54.6 ACUTE THORACIC BACK PAIN: Status: RESOLVED | Noted: 2021-04-07 | Resolved: 2021-08-02

## 2021-08-03 ENCOUNTER — DOCUMENTATION ONLY (OUTPATIENT)
Dept: PHARMACY | Facility: CLINIC | Age: 31
End: 2021-08-03

## 2021-08-03 ENCOUNTER — VIRTUAL VISIT (OUTPATIENT)
Dept: SLEEP MEDICINE | Facility: CLINIC | Age: 31
End: 2021-08-03
Attending: PHYSICIAN ASSISTANT
Payer: COMMERCIAL

## 2021-08-03 ENCOUNTER — HOME INFUSION (PRE-WILLOW HOME INFUSION) (OUTPATIENT)
Dept: PHARMACY | Facility: CLINIC | Age: 31
End: 2021-08-03

## 2021-08-03 VITALS — WEIGHT: 282 LBS | BODY MASS INDEX: 44.26 KG/M2 | HEIGHT: 67 IN

## 2021-08-03 DIAGNOSIS — E66.813 CLASS 3 SEVERE OBESITY DUE TO EXCESS CALORIES WITH BODY MASS INDEX (BMI) OF 40.0 TO 44.9 IN ADULT, UNSPECIFIED WHETHER SERIOUS COMORBIDITY PRESENT (H): ICD-10-CM

## 2021-08-03 DIAGNOSIS — R53.83 MALAISE AND FATIGUE: ICD-10-CM

## 2021-08-03 DIAGNOSIS — E66.01 CLASS 3 SEVERE OBESITY DUE TO EXCESS CALORIES WITH BODY MASS INDEX (BMI) OF 40.0 TO 44.9 IN ADULT, UNSPECIFIED WHETHER SERIOUS COMORBIDITY PRESENT (H): ICD-10-CM

## 2021-08-03 DIAGNOSIS — R06.89 DYSPNEA AND RESPIRATORY ABNORMALITY: Primary | ICD-10-CM

## 2021-08-03 DIAGNOSIS — R06.83 SNORING: ICD-10-CM

## 2021-08-03 DIAGNOSIS — I10 ESSENTIAL HYPERTENSION: ICD-10-CM

## 2021-08-03 DIAGNOSIS — G47.30 SLEEP APNEA, UNSPECIFIED TYPE: ICD-10-CM

## 2021-08-03 DIAGNOSIS — G61.81 CHRONIC INFLAMMATORY DEMYELINATING POLYNEUROPATHY (H): Chronic | ICD-10-CM

## 2021-08-03 DIAGNOSIS — Z72.820 LACK OF ADEQUATE SLEEP: ICD-10-CM

## 2021-08-03 DIAGNOSIS — R53.81 MALAISE AND FATIGUE: ICD-10-CM

## 2021-08-03 DIAGNOSIS — R06.00 DYSPNEA AND RESPIRATORY ABNORMALITY: Primary | ICD-10-CM

## 2021-08-03 PROBLEM — F29 PSYCHOSIS (H): Chronic | Status: ACTIVE | Noted: 2021-04-22

## 2021-08-03 PROBLEM — E83.42 HYPOMAGNESEMIA: Status: RESOLVED | Noted: 2021-02-06 | Resolved: 2021-08-03

## 2021-08-03 PROCEDURE — 99203 OFFICE O/P NEW LOW 30 MIN: CPT | Mod: 95 | Performed by: PHYSICIAN ASSISTANT

## 2021-08-03 RX ORDER — HYDROXYZINE HYDROCHLORIDE 25 MG/1
25 TABLET, FILM COATED ORAL DAILY PRN
COMMUNITY
End: 2021-09-02

## 2021-08-03 ASSESSMENT — MIFFLIN-ST. JEOR: SCORE: 2031.77
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
## 2021-08-03 NOTE — PROGRESS NOTES
Skilled Nurse visit in the  patient home to administer Privigen 60g.   No recent elevated temperature, fever, chills, productive cough, coughing for 3 weeks or longer or hemoptysis, abnormal vital signs, night sweats, chest pain. No  decrease in your appetite, unexplained weight loss or fatigue.  No other new onset medical symptoms.  Current weight 282 lbs.  PIV placed right hand , 1 attempt/s.  Pre medicated with Acetaminophen 650 mg by mouth, 30 minutes prior to infusion. Diphenhydramine 50 mg by mouth, 30 minutes prior to infusion. Hydrocortisone 100 mg IV push over 2-5 minutes, 30 minutes prior to infusion. Infusion completed without complication or reaction. Pt reports therapy is effective  in managing symptoms related to therapy.    Love Ribera RN, BSN  [facility]  [phone]  [email or web address]

## 2021-08-03 NOTE — PATIENT INSTRUCTIONS
Your BMI is There is no height or weight on file to calculate BMI.  Weight management is a personal decision.  If you are interested in exploring weight loss strategies, the following discussion covers the approaches that may be successful. Body mass index (BMI) is one way to tell whether you are at a healthy weight, overweight, or obese. It measures your weight in relation to your height.  A BMI of 18.5 to 24.9 is in the healthy range. A person with a BMI of 25 to 29.9 is considered overweight, and someone with a BMI of 30 or greater is considered obese. More than two-thirds of [place] adults are considered overweight or obese.  Being overweight or obese increases the risk for further weight gain. Excess weight may lead to heart disease and diabetes.  Creating and following plans for healthy eating and physical activity may help you improve your health.  Weight control is part of healthy lifestyle and includes exercise, emotional health, and healthy eating habits. Careful eating habits lifelong are the mainstay of weight control. Though there are significant health benefits from weight loss, long-term weight loss with diet alone may be very difficult to achieve- studies show long-term success with dietary management in less than 10% of people. Attaining a healthy weight may be especially difficult to achieve in those with severe obesity. In some cases, medications, devices and surgical management might be considered.  What can you do?  If you are overweight or obese and are interested in methods for weight loss, you should discuss this with your provider.     Consider reducing daily calorie intake by 500 calories.     Keep a food journal.     Avoiding skipping meals, consider cutting portions instead.    Diet combined with exercise helps maintain muscle while optimizing fat loss. Strength training is particularly important for building and maintaining muscle mass. Exercise helps reduce stress, increase energy,  and improves fitness. Increasing exercise without diet control, however, may not burn enough calories to loose weight.       Start walking three days a week 10-20 minutes at a time    Work towards walking thirty minutes five days a week     Eventually, increase the speed of your walking for 1-2 minutes at time    In addition, we recommend that you review healthy lifestyles and methods for weight loss available through the National Institutes of Health patient information sites:  http://win.niddk.nih.gov/publications/index.htm    And look into health and wellness programs that may be available through your health insurance provider, employer, local community center, or graeme club.    Weight management plan: Patient was referred to their PCP to discuss a diet and exercise plan.    MY CONTACT NUMBERS ARE:   DOCTOR : GUY Boyer  SLEEP CENTER :   CPAP EQUIPMENT :    IF I HAVE SLEEP APNEA.....  WHERE CAN I FIND MORE INFORMATION?    American Academy of Sleep Medicine Patient information on sleep disorders:  http://yoursleep.aasmnet.org    THINGS TO REMEMBER  In most situations, sleep apnea is a lifelong disease that must be managed with daily therapy. Untreated disease, when severe, may result in an increased risk for an array of problems from heart disease to mood changes, car accidents and shorter lifespan.    CPAP -  WHY AND HOW?  Continuous positive airway pressure, or CPAP, is the most effective treatment for obstructive sleep apnea. A decision to use CPAP is a major step forward in the pursuit of a healthier life. The successful use of CPAP will help you breathe easier, sleep better and live healthier. Using CPAP can be a positive experience if you keep these preston points in mind:  1. Commitment  CPAP is not a quick fix for your problem. It involves a long-term commitment to improve your sleep and your health.    2. Communication  Stay in close communication with both your sleep doctor and your CPAP supplier.  "Ask lots of questions and seek help when you need it.    3. Consistency  Use CPAP all night, every night and for every nap. You will receive the maximum health benefits from CPAP when you use it every time that you sleep. This will also make it easier for your body to adjust to the treatment.    4. Correction  The first machine and mask that you try may not be the best ones for you. Work with your sleep doctor and your CPAP supplier to make corrections to your equipment selection. Ask about trying a different type of machine or mask if you have ongoing problems. Make sure that your mask is a good fit and learn to use your equipment properly.    5. Challenge  Tell a family member or close friend to ask you each morning if you used your CPAP the previous night. Have someone to challenge you to give it your best effort.    6. Connection   Your adjustment to CPAP will be easier if you are able to connect with others who use the same treatment. Ask your sleep doctor if there is a support group in your area for people who have sleep apnea, or look for one on the Internet.    7. Comfort   Increase your level of comfort by using a saline spray, decongestant or heated humidifier if CPAP irritates your nose, mouth or throat. Use your unit's \"ramp\" setting to slowly get used to the air pressure level. There may be soft pads you can buy that will fit over your mask straps. Look on www.CPAP.com for accessories such as these straps, a pillow contoured for side-sleeping with CPAP, longer hoses, hose covers to reduce condensation, or stands to keep the hose out of your way.                                 8. Cleaning   Clean your mask, tubing and headgear on a regular basis. Put this time in your schedule so that you don't forget to do it. Check and replace the filters for your CPAP unit and humidifier.    9. Completion   Although you are never finished with CPAP therapy, you should reward yourself by celebrating the completion of " your first month of treatment. Expect this first month to be your hardest period of adjustment. It will involve some trial and error as you find the machine, mask and pressure settings that are right for you.    Continuation  After your first month of treatment, continue to make a daily commitment to use your CPAP all night, every night and for every nap.    CPAP-Tips to starting with success:  Begin using your CPAP for short periods of time during the day while you watch TV or read.    Use CPAP every night and for every nap. Using it less often reduces the health benefits and makes it harder for your body to get used to it.    Newer CPAP models are virtually silent; however, if you find the sound of your CPAP machine to be bothersome, place the unit under your bed to dampen the sound.     Make small adjustments to your mask, tubing, straps and headgear until you get the right fit. Tightening the mask may actually worsen the leak.  If it leaves significant marks on your face or irritates the bridge of your nose, it may not be the best mask for you.  Speak with the person who supplied the mask and consider trying other masks.    Use a saline nasal spray to ease mild nasal congestion. Neti-Pot or saline nasal rinses may also help. Nasal gel sprays can help reduce nasal dryness.  Biotene mouthwash can be helpful to protect your teeth if you experience frequent dry mouth.  Dry mouth may be a sign of air escaping out of your mouth or out of the mask in the case of a full face mask.  Speak with your provider if you expect that is the case.     Take a nasal decongestant to relieve more severe nasal or sinus congestion.  Do not use Afrin (oxymetazoline) nasal spray more than 3 days in a row.  Speak with your sleep doctor if your nasal congestion is chronic.    Use a heated humidifier that fits your CPAP model to enhance your breathing comfort. Adjust the heat setting up if you get a dry nose or throat, down if you get  condensation in the hose or mask.  Position the CPAP lower than you so that any condensation in the hose drains back into the machine rather than towards the mask.    Try a system that uses nasal pillows if traditional masks give you problems.    Clean your mask, tubing and headgear once a week. Make sure the equipment dries fully.    Regularly check and replace the filters for your CPAP unit and humidifier.    Work closely with your sleep doctor and your CPAP supplier to make sure that you have the machine, mask and air pressure setting that works best for you.    BESIDES CPAP, WHAT OTHER THERAPIES ARE THERE?    Postioning devices if you only have the problem on your back    Dental devices if your condition is mild    Nasal valves may be effective though experience is limited    Tongue Retaining Device if missing teeth precludes the use of a dental device    Weight loss if you are overweight    Surgery in limited cases where devices are not acceptable or there are problems with structures in the nose and throat  If treated with one of these alternative options, further evaluation is necessary to ensure that the therapy is effective. This may require some form of testing.     Healthy Lifestyle:  Healthy diet, exercise and Limit alcohol: Not only will excessive alcohol increase your weight over time, but it irritates the throat tissues and make them swell, shrinking the airway and causing snoring. Drinking alcohol should be limited and stopped within 3-4 hours before going to bed.   Stop smoking: (Red swollen throat, heat, nicotine), also irritates and swells the airway, among numerous other negative health consequences.    Positioning Device  This example shows a pillow that straps around the waist. It may be appropriate for those whose sleep study shows milder sleep apnea that occurs primarily when lying flat on one's back. Preliminary studies have shown benefit but effectiveness at home should be  verified.    Nasal Valves              Nasal valves may not be effective if you have frequent nasal congestion or have difficulty breathing through your nose. They may be an option for mild apnea if other options are not well tolerated. The efficacy of these devices is generally less than CPAP or oral appliances.  Oral Appliance  These are examples of two of many custom-made devices that are more likely to work in mild sleep apnea  Oral appliances are dental mouth pieces that fit very much like a sports mouth guards or removable orthodontic retainers. They are used to treat snoring  And obstructive sleep apnea . The device prevents the airway from collapsing by either holding the tongue or supporting the jaw in a forward position. Since oral appliances are non-invasive and easy to use, they may be considered as an early treatment option. Oral appliance therapy (OAT) involves the customization, selection, fabrication, fitting, adjustments and long-term follow-up care of specially designed oral devices, worn during sleep, which reposition the lower jaw and tongue base forward to maintain an open airway.  Custom made oral appliances are proven to be more effective than over-the-counter devices. Therefore, the over-the-counter devices are recommended not to be used as a screening tool nor as a therapeutic option.  Who gets a dental device?  Oral appliance therapy can be used as an alternative to  CPAP therapy for the treatment of mild to moderate sleep apnea and for those patients who prefer OAT to CPAP. Oral appliance therapy is a first line therapy for the treatment of primary snoring. Additionally, OAT is an option for those that cannot tolerate CPAP as therapy or who have experienced insufficient surgical results.    Possible side effects?  Frequent but minor side effects include: excessive salivation, dry mouth, discomfort of teeth and jaw and temporary changes in the patient s bite.  Potential complications  include: jaw pain, permanent occlusal changes and TMJ symptoms.  The above mentioned side effects and complications can be recognized and managed by dentists trained in dental sleep medicine.    Finding a dentist that practices dental sleep medicine  Specific training is available through the American Academy of Dental Sleep Medicine for dentists interested in working in the field of sleep. To find a dentist who is educated in the field of sleep and the use of oral appliances, near you, visit the Web site of the American Academy of Dental Sleep Medicine; also see http://www.accpstorage.[email or web address]/newOrganization/patients/oralAppliances.pdf   To search for a dentist certified in these practices:  Http://aadsm.org/FindADentist.aspx?1  Http://[email or web address]/[email or web address]/patients/[email or web address]    Tongue Retaining Device               Tongue Retaining Devices are devices that generally  suction cup  onto the tongue preventing it from falling back into the back of the throat during sleep.  They may be an option for people missing teeth, but can be uncomfortable. This particular device can be purchased online, but similar devices made by dentists fit more precisely and may be tolerated better. In general, they are rarely effective and often not very well tolerated.    Weight Loss:  Some patients may experience reduction or elimination of sleep apnea with weight loss.  Though there are significant health benefits from weight loss, long-term weight loss is very difficult to achieve- studies show success with dietary management in less that 10% of people.     If you are interested in dietary weight loss, you should review the options discussed at the National Institutes of Health patient information site:     Http:/www.health.nih.gov/topic/WeightLossDieting    Bariatric programs offer counseling in all methods of weight  "loss:    Http:/[email or web address]    Surgery:  There are a number of surgeries that have been attempted to treat apnea. In general, surgical options are usually reserved for cases in which there is a physical abnormality contributing to obstruction or other treatment options are ineffective or not tolerated. Most surgical options are either unreliable or quite invasive. One of the more common procedures is:  Uvulopalatopharyngoplasty: In this procedure, the uvula (the finger-like tissue that hangs in the back of the throat), part of the soft palate (the tissue that the uvula is attached to), and sometimes the tonsils or adenoids are removed. The efficacy of this surgery is around 30-50% .  After surgery, complications may include:  Sleepiness and sleep apnea related to post-surgery medication   Swelling, infection and bleeding   A sore throat and/or difficulty swallowing   Drainage of secretions into the nose and a nasal quality to the voice. English language speech does not seem to be affected by this surgery.   Narrowing of the airway in the nose and throat (hence constricting breathing) snoring and even iatrogenically caused sleep apnea. By cutting the tissues, excess scar tissue can \"tighten\" the airway and make it even smaller than it was before UPPP.  Patients who have had the uvula removed will become unable to correctly speak [place]i or any other language that has a uvular 'r' phoneme.    Surgeries to help resolve nasal congestion may help reduce the severity of apnea slightly. Nasal congestion does not cause apnea on its own, so these surgeries are usually not performed just for KENDALL.  They may be worth considering if the nasal congestion is significantly bothersome independent of apnea.    Dream Rehearsal:  Write down an average nightmare. Then rewrite the dream changing the negative aspects into positive aspects. Alternatively, write an entirely new " dream.  Spend 1-2 minutes, a few times per day, imagining the pleasant dream.  Walk yourself through the entire dream in your mind. Do this before bed as well as other times of the day. Create new dreams no more than twice per week.

## 2021-08-04 ENCOUNTER — OFFICE VISIT (OUTPATIENT)
Dept: NEUROLOGY | Facility: CLINIC | Age: 31
End: 2021-08-04
Payer: COMMERCIAL

## 2021-08-04 ENCOUNTER — HOME INFUSION (PRE-WILLOW HOME INFUSION) (OUTPATIENT)
Dept: PHARMACY | Facility: CLINIC | Age: 31
End: 2021-08-04

## 2021-08-04 VITALS
HEART RATE: 108 BPM | SYSTOLIC BLOOD PRESSURE: 124 MMHG | RESPIRATION RATE: 16 BRPM | DIASTOLIC BLOOD PRESSURE: 78 MMHG | BODY MASS INDEX: 45.26 KG/M2 | WEIGHT: 289 LBS | OXYGEN SATURATION: 100 %

## 2021-08-04 DIAGNOSIS — G54.9 SENSORY GANGLIONOPATHY: Primary | ICD-10-CM

## 2021-08-04 PROCEDURE — 99214 OFFICE O/P EST MOD 30 MIN: CPT | Performed by: PSYCHIATRY & NEUROLOGY

## 2021-08-04 ASSESSMENT — PAIN SCALES - GENERAL: PAINLEVEL: EXTREME PAIN (8)
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
## 2021-08-04 NOTE — RESULT ENCOUNTER NOTE
Aleksey Bonner,    Attached are your test results.  Your holter did not show and significant heart arrythmias   Did you have any symptoms at all while you had the monitor on you?   Please contact us if you have any questions.    Kylah Moseley, CNP
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
## 2021-08-05 ENCOUNTER — THERAPY VISIT (OUTPATIENT)
Dept: PHYSICAL THERAPY | Facility: CLINIC | Age: 31
End: 2021-08-05
Payer: COMMERCIAL

## 2021-08-05 DIAGNOSIS — M54.6 BILATERAL THORACIC BACK PAIN, UNSPECIFIED CHRONICITY: Primary | ICD-10-CM

## 2021-08-05 PROCEDURE — 97164 PT RE-EVAL EST PLAN CARE: CPT | Mod: GP | Performed by: PHYSICAL THERAPIST

## 2021-08-05 PROCEDURE — 97112 NEUROMUSCULAR REEDUCATION: CPT | Mod: GP | Performed by: PHYSICAL THERAPIST

## 2021-08-05 PROCEDURE — 97110 THERAPEUTIC EXERCISES: CPT | Mod: GP | Performed by: PHYSICAL THERAPIST

## 2021-08-05 NOTE — LETTER
[facility] SERVICES [place]  [address]  [facility] [address]  [phone]    August 10, 2021    Re: Hilaria Bonner   :   1990  MRN:  [id number]   REFERRING PHYSICIAN:   Kylah GALVAN [facility]  Date of Initial Evaluation:  21  Visits:  Rxs Used: 6  Reason for Referral:  Bilateral thoracic back pain, unspecified chronicity  Oswestry Score: 44.44 %                 PROGRESS  REPORT/RE-EVAL  Progress reporting period is from 2021 to 2021.     Patient has completed 6 PT visits.    SUBJECTIVE  Subjective: Returns to clinic after a 2+ month absence to resume PT. Reporting she is feeling overall, 75% better. Last week MD removed all restrictions and DC'd her trunk brace. Able to walk 2-3 blocks. Has diificulty standing to wash dishes or fold laundry 5-10 minutes with increased pain(7/10)and fatigue.     Current Pain level: 3/10.     Initial Pain level: 10/10.   Changes in function:  Yes (See Goal flowsheet attached for changes in current functional level)  Adverse reaction to treatment or activity: None    OBJECTIVE  Objective: Patient ambulating into clinic without AD or brace. Improved AROM of lumbar spine: flexion mod loss(from max) and extension mod loss(from max loss), R/L thoracic rotation mod loss. Improved MMT: hip flexion 4/5, knee extension 4+/5 and DF 5/5, symmetrical. Upper abdominal strength remains 3-/5 due to weakness and increased back pain. Unable to SLS on either LE.  NICOLE improved from 74 to 44%      ASSESSMENT/PLAN  Updated problem list and treatment plan: Diagnosis 1:  Thoracic Back pain(healing T6 and T9 compression fractures)  Pain -  self management, education, directional preference exercise and home program  Decreased ROM/flexibility - therapeutic exercise, therapeutic activity and home program  Decreased strength - therapeutic exercise, therapeutic activities  and home program  Decreased proprioception - neuro re-education, gait training, therapeutic activities and home program  Decreased function - therapeutic activities and home program  Impaired posture - neuro re-education, therapeutic activities and home program  STG/LTGs have been met or progress has been made towards goals:  Yes (See Goal flow sheet completed today.)  Assessment of Progress: The patient's condition is improving.  The patient's condition has potential to improve.  Re: Hilaria Bonner   :   1990    Self Management Plans:  Patient has been instructed in a home treatment program.  Patient  has been instructed in self management of symptoms.  I have re-evaluated this patient and find that the nature, scope, duration and intensity of the therapy is appropriate for the medical condition of the patient.  Hilaria continues to require the following intervention to meet STG and LTG's:  PT    Recommendations:  This patient would benefit from continued therapy for progression of trunk strengthening.    Frequency:  1 X week, once daily  Duration:  for 8 additional weeks        Thank you for your referral.    INQUIRIES  Therapist: Radha Morgan PT  [facility] SERVICES [place]  [address] [address]  Phone: [phone]  Fax: [phone]

## 2021-08-05 NOTE — PROGRESS NOTES
This is a recent snapshot of the patient's [facility] medical record.  For current drug dose and complete information and questions, call [phone] or In Basket pool, fv home infusion ([id number])  CSN Number:  [id number]

## 2021-08-06 NOTE — PROGRESS NOTES
This is a recent snapshot of the patient's [facility] medical record.  For current drug dose and complete information and questions, call [phone] or In Basket pool, fv home infusion ([id number])  CSN Number:  [id number]

## 2021-08-09 NOTE — PROGRESS NOTES
This is a recent snapshot of the patient's [facility] medical record.  For current drug dose and complete information and questions, call [phone] or In Basket pool, fv home infusion ([id number])  CSN Number:  [id number]

## 2021-08-09 NOTE — PROGRESS NOTES
Subjective:  HPI  Physical Exam  Oswestry Score: 44.44 %                 Objective:  System    Physical Exam    General     ROS    Assessment/Plan:    PROGRESS  REPORT/RE-EVAL    Progress reporting period is from 4/7/2021 to 8/5/2021.     Patient has completed 6 PT visits.    SUBJECTIVE  Subjective: Returns to clinic after a 2+ month absence to resume PT. Reporting she is feeling overall, 75% better. Last week MD removed all restrictions and DC'd her trunk brace. Able to walk 2-3 blocks. Has diificulty standing to wash dishes or fold laundry 5-10 minutes with increased pain(7/10)and fatigue.     Current Pain level: 3/10.     Initial Pain level: 10/10.   Changes in function:  Yes (See Goal flowsheet attached for changes in current functional level)  Adverse reaction to treatment or activity: None    OBJECTIVE    Objective: Patient ambulating into clinic without AD or brace. Improved AROM of lumbar spine: flexion mod loss(from max) and extension mod loss(from max loss), R/L thoracic rotation mod loss. Improved MMT: hip flexion 4/5, knee extension 4+/5 and DF 5/5, symmetrical. Upper abdominal strength remains 3-/5 due to weakness and increased back pain. Unable to SLS on either LE.  NICOLE improved from 74 to 44%      ASSESSMENT/PLAN  Updated problem list and treatment plan: Diagnosis 1:  Thoracic Back pain(healing T6 and T9 compression fractures)  Pain -  self management, education, directional preference exercise and home program  Decreased ROM/flexibility - therapeutic exercise, therapeutic activity and home program  Decreased strength - therapeutic exercise, therapeutic activities and home program  Decreased proprioception - neuro re-education, gait training, therapeutic activities and home program  Decreased function - therapeutic activities and home program  Impaired posture - neuro re-education, therapeutic activities and home program  STG/LTGs have been met or progress has been made towards goals:  Yes (See Goal  flow sheet completed today.)  Assessment of Progress: The patient's condition is improving.  The patient's condition has potential to improve.  Self Management Plans:  Patient has been instructed in a home treatment program.  Patient  has been instructed in self management of symptoms.  I have re-evaluated this patient and find that the nature, scope, duration and intensity of the therapy is appropriate for the medical condition of the patient.  Hilaria continues to require the following intervention to meet STG and LTG's:  PT    Recommendations:  This patient would benefit from continued therapy for progression of trunk strengthening.    Frequency:  1 X week, once daily  Duration:  for 8 additional weeks        Please refer to the daily flowsheet for treatment today, total treatment time and time spent performing 1:1 timed codes.

## 2021-08-09 NOTE — PROGRESS NOTES
This is a recent snapshot of the patient's [facility] medical record.  For current drug dose and complete information and questions, call [phone] or In Basket pool, fv home infusion ([id number])  CSN Number:  [id number]

## 2021-08-11 ENCOUNTER — VIRTUAL VISIT (OUTPATIENT)
Dept: ENDOCRINOLOGY | Facility: CLINIC | Age: 31
End: 2021-08-11
Payer: COMMERCIAL

## 2021-08-11 VITALS — BODY MASS INDEX: 45.36 KG/M2 | HEIGHT: 67 IN | WEIGHT: 289 LBS

## 2021-08-11 DIAGNOSIS — E66.01 MORBID OBESITY (H): Primary | ICD-10-CM

## 2021-08-11 DIAGNOSIS — Z98.84 S/P LAPAROSCOPIC SLEEVE GASTRECTOMY: ICD-10-CM

## 2021-08-11 DIAGNOSIS — Z71.3 NUTRITIONAL COUNSELING: Primary | ICD-10-CM

## 2021-08-11 PROCEDURE — 99212 OFFICE O/P EST SF 10 MIN: CPT | Mod: 95 | Performed by: PHYSICIAN ASSISTANT

## 2021-08-11 PROCEDURE — 97803 MED NUTRITION INDIV SUBSEQ: CPT | Mod: 95 | Performed by: DIETITIAN, REGISTERED

## 2021-08-11 ASSESSMENT — MIFFLIN-ST. JEOR: SCORE: 2063.53

## 2021-08-11 ASSESSMENT — PAIN SCALES - GENERAL: PAINLEVEL: SEVERE PAIN (7)

## 2021-08-11 NOTE — LETTER
"8/11/2021       RE: Hilaria Bonner  [address]  [address]  [facility] [address]     Dear Colleague,    Thank you for referring your patient, Hilaria Bonner, to the [facility] [place] at [facility]. Please see a copy of my visit note below.    Hilaria Bonner is a 30 year old female who is being evaluated via a billable telephone visit.      The patient has been notified of following:     \"This telephone visit will be conducted via a call between you and your physician/provider. We have found that certain health care needs can be provided without the need for a physical exam.  This service lets us provide the care you need with a short phone conversation.  If a prescription is necessary we can send it directly to your pharmacy.  If lab work is needed we can place an order for that and you can then stop by our lab to have the test done at a later time.    Telephone visits are billed at different rates depending on your insurance coverage. During this emergency period, for some insurers they may be billed the same as an in-person visit.  Please reach out to your insurance provider with any questions.    If during the course of the call the physician/provider feels a telephone visit is not appropriate, you will not be charged for this service.\"    Patient has given verbal consent for Telephone visit?  Yes    What phone number would you like to be contacted at? [phone]    How would you like to obtain your AVS? [employer]    Phone call duration: 28    During this virtual visit the patient is located in [place], patient verifies this as the location during the entirety of this visit.     Reason For Visit:  Hilaria Bonner is a 30 year old female, completed a virtual visit today for nutrition follow-up s/p SG with Dr Lopez (3/26/19).  Patient referred by Joanne TOVAR.      Anthropometrics  Initial Consult Weight: " "315.4 lbs  Day of Surgery Weight(3/26/19): 291.2 lbs    Current Weight:   Estimated body mass index is 45.26 kg/m  as calculated from the following:    Height as of an earlier encounter on 8/11/21: 1.702 m (5' 7\").    Weight as of an earlier encounter on 8/11/21: 131.1 kg (289 lb).        Current Vitamins/Minerals:  MVI with iron 2x/day 500 mg Calcium Citrate (TID), Vitamin D3 (2000 international unit(s)), B-50 complex     Nutrition History:   August 11 2021  Pt has not seen RD since 12/16/2020    No appetite for ~ 2 weeks, feeling full off of very little amounts    Recall:  Shrimp fried rice   Fluids: water (5-6 bottles)  Does not recall if she ate anything else    Has been trying to cut back on fluids, reports last RD told her she was drinking way too much . Was drinking around  oz. Reports she tries to stay hydrated for treatment and might have to get fluids in the ER if not staying hydrated. Encouraged pt to work on hydration as well as intake     Does not like the taste of protein shake    Wanted to review foods she should/should not be eating. Wants to know what starchy vegetables are.    Reviewed dietary recommendations/guidelines   - Pt recalls protein minimum   - Pt recalls  fluids from 30 min before/after but has not been doing  - Sugar/fat - pt did not remember, informed her of recommendations     Mailed following handouts:   Regular diet, protein sources, meal ideas, site with bariatric plats/utensils, dietary guidelines after bariatric surgery     Physical Activity:  Did not discuss today     Nutrition Prescription:  Grams Protein: 50-60 (minimum)  Amount of Fluid: 48-64 oz    Nutrition Diagnosis   Food and nutrition-related knowledge deficit r/t limited prior exposure to maintenance diet guidelines after bariatric surgery aeb pt unable to verbalize full understanding of maintenance diet guidelines post bariatric surgery.      Intervention  Mailed following handouts:   Regular diet, " protein sources, meal ideas, site with bariatric plats/utensils, dietary guidelines after bariatric surgery  Assessed current dietary habits  Nutrition Education on Dietary recommendations   Answered pt questions     Goals:  1) Continue bariatric regular diet.   2) Aim for 3 meals per day   3)Consume 60 grams of protein/day.    - Eat protein foods first.   4) Sip on 64-96 oz of fluids/day- between meals only (do not drink 30 mins before or after meal or while you're eating)  4) Eat slowly (>20 min/meal), chewing foods well (to applesauce-like consistency).   - Use a baby/toddler fork/spoon and small plate/bowls (should hold 1 cup or less)  5) Limit portions to 3/4 to 1 cup/meal.   6) Take the following supplements:    Multivitamin/minerals: adult dose 2 times daily    Iron: 45-60 mg elemental (18-36 mg if low risk) - may partly or fully be covered in multivitamin     Calcium Citrate containing vitamin D: 500 mg 3 times daily or 600 mg 2 times daily    Vitamin B12: sublingual form of at least 500 mcg daily or injection of 1000 mcg monthly     B-50 Complex once daily    Diet Guidelines after Weight-loss Surgery  [email or web address]     Regular Foods/Meal Ideas  [email or web address]     Protein Sources   [email or web address]     Link to Bariatric Store with Plates/Utensils  [email or web address]      Follow-Up:  1 month, PRN     Time spent with pt: 28 mins  SIL Kate, RD, LD

## 2021-08-11 NOTE — NURSING NOTE
"Chief Complaint   Patient presents with     Follow Up     [facility] follow up       Vitals:    08/11/21 0821   Weight: 131.1 kg (289 lb)   Height: 1.702 m (5' 7\")       Body mass index is 45.26 kg/m .                         "

## 2021-08-11 NOTE — LETTER
2021       RE: Hilaria Bonner  [address]  [address]  [facility] [address]     Dear Colleague,    Thank you for referring your patient, Hilaria Bonner, to the [facility]IGHT MANAGEMENT CLINIC [place] at [facility]. Please see a copy of my visit note below.    Hilaria is a 30 year old who is being evaluated via a billable telephone visit.      What phone number would you like to be contacted at? [phone]    How would you like to obtain your AVS? MyChart     During this virtual visit the patient is located in [place], patient verifies this as the location during the entirety of this visit.     Phone call duration: 10 minutes    Return Medical Weight Management Note     Hilaria Bonner  MRN:  [id number]  :  1990  HILDA:  2021    Dear Crissy Madrigal PA-C,    I had the pleasure of seeing your patient Hilaria Bonner. She is a 30 year old female who I am continuing to see for treatment of obesity related to:       2018   I have the following health issues associated with obesity: High Cholesterol, Weight Bearing Joint Pain       10 minutes spent on the date of the encounter doing chart review, history and exam, documentation and further activities per the note      INTERVAL HISTORY:  S/p sleeve 2018 with weight loss from 320 lbs to 230 lbs and then regained to 289 today  Taking B complex and MVI twice daily with iron, Vit D and calcium citrate  No GERD controlled on omeprazole  Seeing RD today  Has taken topiramate in the past but stopped by other providers because not effective for nerve pain and weight loss. She already struggles with brain fog and   Follow up in 1 month RD  Follow up in 3 months Joanne      CURRENT WEIGHT:   289 lbs 0 oz    Highest weight: 320 lbs  Lowest after surgery 230 lbs  Initial Weight (lbs): 315.4 lbs  Last Visits Weight: 114.8 kg (253 lb)  Cumulative weight loss (lbs): 26.4  Weight  "Loss Percentage: 8.37%    Changes and Difficulties 8/11/2021   I have made the following changes to my diet since my last visit: same   With regards to my diet, I am still struggling with: nothing   I have made the following changes to my activity/exercise since my last visit: not as active   With regards to my activity/exercise, I am still struggling with: constipation       VITALS:  Ht 1.702 m (5' 7\")   Wt 131.1 kg (289 lb)   BMI 45.26 kg/m      MEDICATIONS:   Current Outpatient Medications   Medication Sig Dispense Refill     Blood Pressure Monitoring (BLOOD PRESSURE MONITOR/ARM) BRAYAN        COMPOUNDED NON-CONTROLLED SUBSTANCE (CMPD RX) - PHARMACY TO MIX COMPOUNDED MEDICATION Apply 1-2 g topically 3 times daily as needed (pain) Apply three times daily as needed for pain. 120 g 1     diphenhydrAMINE (BENADRYL) 50 MG/ML injection        folic acid (FOLVITE) 1 MG tablet Take 1 tablet (1 mg) by mouth daily 30 tablet 11     hydrochlorothiazide (HYDRODIURIL) 12.5 MG tablet Take 1 tablet (12.5 mg) by mouth daily 30 tablet 1     hydrOXYzine (ATARAX) 25 MG tablet Take 25 mg by mouth daily as needed for itching       lisinopril (ZESTRIL) 10 MG tablet Take 1 tablet (10 mg) by mouth daily 90 tablet 0     Multiple Vitamins-Minerals (MULTIVITAMIN ADULT) CHEW Take 1 chew tab by mouth 2 times daily 60 tablet 11     [facility] MEDICAL CANABIS       OLANZapine (ZYPREXA) 10 MG tablet Take 1 tablet (10 mg) by mouth At Bedtime 30 tablet 1     omeprazole (PRILOSEC) 20 MG DR capsule Take 1 capsule (20 mg) by mouth daily 28 capsule 11     ondansetron (ZOFRAN-ODT) 4 MG ODT tab Take 1 tablet (4 mg) by mouth every 8 hours as needed for nausea 30 tablet 1     polyethylene glycol (MIRALAX) 17 GM/SCOOP powder Take 17 g (1 capful) by mouth daily 850 g 3     potassium chloride ER (KLOR-CON M) 20 MEQ CR tablet Take 1 tablet (20 mEq) by mouth 2 times daily 180 tablet 0     prazosin (MINIPRESS) 1 MG capsule Take 2 capsules (2 mg) by mouth At " Bedtime 60 capsule 2     Pregabalin (LYRICA) 200 MG capsule Take 1 capsule (200 mg) by mouth 3 times daily 90 capsule 3     PRIVIGEN 20 GM/200ML SOLN        PRIVIGEN 40 GM/400ML SOLN        rivaroxaban ANTICOAGULANT (XARELTO ANTICOAGULANT) 20 MG TABS tablet Take 1 tablet (20 mg) by mouth daily (with dinner) 90 tablet 0     tiZANidine (ZANAFLEX) 2 MG tablet Take 1-2 tablets (2-4 mg) by mouth 3 times daily as needed for muscle spasms 50 tablet 1     traMADol (ULTRAM) 50 MG tablet Take 1 tablet (50 mg) by mouth every 6 hours as needed for severe pain 20 tablet 0     venlafaxine (EFFEXOR) 37.5 MG tablet Take 2 tablets (75 mg) by mouth 2 times daily 60 tablet 1     vitamin B-Complex Take 1 tablet by mouth daily 90 tablet 5     vitamin C (ASCORBIC ACID) 1000 MG TABS Take 1 tablet (1,000 mg) by mouth daily 30 tablet 0     vitamin D3 (CHOLECALCIFEROL) 50 mcg (2000 units) tablet TAKE 1 TABLET BY MOUTH DAILY 30 tablet 0     UNABLE TO FIND 2,500 mcg by Oral or Feeding Tube route daily MEDICATION NAME: Vitamin B12       UNABLE TO FIND 5,000 mcg by Oral or Feeding Tube route daily MEDICATION NAME: OTC Biotin       UNABLE TO FIND 1 tablet by Oral or Feeding Tube route daily MEDICATION NAME: OTC Probiotics       UNABLE TO FIND 1 tablet by Oral or Feeding Tube route daily MEDICATION NAME: OTC-Vitamin B complex         Weight Loss Medication History Reviewed With Patient 8/11/2021   Which weight loss medications are you currently taking on a regular basis?  None       Office Visit on 07/13/2021   Component Date Value Ref Range Status     TSH 07/13/2021 0.90  0.40 - 4.00 mU/L Final     N Terminal Pro BNP Outpatient 07/13/2021 121  0 - 125 pg/mL Final    Reference range shown and results flagged as abnormal are for the outpatient, non acute settings. Establishing a baseline value for each individual patient is useful for follow-up.    Suggested inpatient cut points for confirming diagnosis of CHF in an acute setting are:  >450 pg/mL  "(age 18 to less than 50)  >900 pg/mL (age 50 to less than 75)  >1800 pg/mL (75 yrs and older)    An inpatient or emergency department NT-proPBNP <300 pg/mL effectively rules out acute CHF, with 99% negative predictive value.         Sodium 07/13/2021 140  133 - 144 mmol/L Final     Potassium 07/13/2021 3.9  3.4 - 5.3 mmol/L Final     Chloride 07/13/2021 108  mmol/L Final     Carbon Dioxide (CO2) 07/13/2021 26  20 - 32 mmol/L Final     Anion Gap 07/13/2021 6  3 - 14 mmol/L Final     Urea Nitrogen 07/13/2021 5* 7 - 30 mg/dL Final     Creatinine 07/13/2021 0.77  mg/dL Final     Calcium 07/13/2021 8.7  8.5 - 10.1 mg/dL Final     Glucose 07/13/2021 110* 70 - 99 mg/dL Final     GFR Estimate 07/13/2021 >90  >60 mL/min/1.73m2 Final    As of July 11, 2021, eGFR is calculated by the CKD-EPI creatinine equation, without race adjustment. eGFR can be influenced by muscle mass, exercise, and diet. The reported eGFR is an estimation only and is only applicable if the renal function is stable.     WBC Count 07/13/2021 5.9  4.0 - 11.0 10e3/uL Final     RBC Count 07/13/2021 3.38* 3.80 - 5.20 10e6/uL Final     Hemoglobin 07/13/2021 12.3  11.7 - 15.7 g/dL Final     Hematocrit 07/13/2021 37.8  35.0 - 47.0 % Final     MCV 07/13/2021 112* 78 - 100 fL Final     MCH 07/13/2021 36.4* 26.5 - 33.0 pg Final     MCHC 07/13/2021 32.5  31.5 - 36.5 g/dL Final     RDW 07/13/2021 13.8  10.0 - 15.0 % Final     Platelet Count 07/13/2021 348  150 - 450 10e3/uL Final       PHYSICAL EXAM:  Objective    Ht 1.702 m (5' 7\")   Wt 131.1 kg (289 lb)   BMI 45.26 kg/m    Vitals - Patient Reported  Pain Score: Severe Pain (7) (back pain)        Physical Exam   healthy, alert and no distress  PSYCH: Alert and oriented times 3; coherent speech, normal   rate and volume, able to articulate logical thoughts, able   to abstract reason, no tangential thoughts, no hallucinations   or delusions  Her affect is normal  RESP: No cough, no audible wheezing, able to talk in " full sentences  Remainder of exam unable to be completed due to telephone visits      Sincerely,    Joanne Sterling PA-C

## 2021-08-11 NOTE — PROGRESS NOTES
Hilaria is a 30 year old who is being evaluated via a billable telephone visit.      What phone number would you like to be contacted at? [phone]    How would you like to obtain your AVS? Ferdinand     During this virtual visit the patient is located in [place], patient verifies this as the location during the entirety of this visit.     Phone call duration: 10 minutes    Return Medical Weight Management Note     Hilaria Bonner  MRN:  [id number]  :  1990  HILDA:  2021    Dear Crissy Madrigal PA-C,    I had the pleasure of seeing your patient Hilaria Bonner. She is a 30 year old female who I am continuing to see for treatment of obesity related to:       2018   I have the following health issues associated with obesity: High Cholesterol, Weight Bearing Joint Pain       10 minutes spent on the date of the encounter doing chart review, history and exam, documentation and further activities per the note      INTERVAL HISTORY:  S/p sleeve 2018 with weight loss from 320 lbs to 230 lbs and then regained to 289 today  Taking B complex and MVI twice daily with iron, Vit D and calcium citrate  No GERD controlled on omeprazole  Seeing RD today  Has taken topiramate in the past but stopped by other providers because not effective for nerve pain and weight loss. She already struggles with brain fog and   Follow up in 1 month RD  Follow up in 3 months Joanne      CURRENT WEIGHT:   289 lbs 0 oz    Highest weight: 320 lbs  Lowest after surgery 230 lbs  Initial Weight (lbs): 315.4 lbs  Last Visits Weight: 114.8 kg (253 lb)  Cumulative weight loss (lbs): 26.4  Weight Loss Percentage: 8.37%    Changes and Difficulties 2021   I have made the following changes to my diet since my last visit: same   With regards to my diet, I am still struggling with: nothing   I have made the following changes to my activity/exercise since my last visit: not as active   With regards to my activity/exercise, I am still  "struggling with: constipation       VITALS:  Ht 1.702 m (5' 7\")   Wt 131.1 kg (289 lb)   BMI 45.26 kg/m      MEDICATIONS:   Current Outpatient Medications   Medication Sig Dispense Refill     Blood Pressure Monitoring (BLOOD PRESSURE MONITOR/ARM) BRAYAN        COMPOUNDED NON-CONTROLLED SUBSTANCE (CMPD RX) - PHARMACY TO MIX COMPOUNDED MEDICATION Apply 1-2 g topically 3 times daily as needed (pain) Apply three times daily as needed for pain. 120 g 1     diphenhydrAMINE (BENADRYL) 50 MG/ML injection        folic acid (FOLVITE) 1 MG tablet Take 1 tablet (1 mg) by mouth daily 30 tablet 11     hydrochlorothiazide (HYDRODIURIL) 12.5 MG tablet Take 1 tablet (12.5 mg) by mouth daily 30 tablet 1     hydrOXYzine (ATARAX) 25 MG tablet Take 25 mg by mouth daily as needed for itching       lisinopril (ZESTRIL) 10 MG tablet Take 1 tablet (10 mg) by mouth daily 90 tablet 0     Multiple Vitamins-Minerals (MULTIVITAMIN ADULT) CHEW Take 1 chew tab by mouth 2 times daily 60 tablet 11     [facility] CANABIS       OLANZapine (ZYPREXA) 10 MG tablet Take 1 tablet (10 mg) by mouth At Bedtime 30 tablet 1     omeprazole (PRILOSEC) 20 MG DR capsule Take 1 capsule (20 mg) by mouth daily 28 capsule 11     ondansetron (ZOFRAN-ODT) 4 MG ODT tab Take 1 tablet (4 mg) by mouth every 8 hours as needed for nausea 30 tablet 1     polyethylene glycol (MIRALAX) 17 GM/SCOOP powder Take 17 g (1 capful) by mouth daily 850 g 3     potassium chloride ER (KLOR-CON M) 20 MEQ CR tablet Take 1 tablet (20 mEq) by mouth 2 times daily 180 tablet 0     prazosin (MINIPRESS) 1 MG capsule Take 2 capsules (2 mg) by mouth At Bedtime 60 capsule 2     Pregabalin (LYRICA) 200 MG capsule Take 1 capsule (200 mg) by mouth 3 times daily 90 capsule 3     PRIVIGEN 20 GM/200ML SOLN        PRIVIGEN 40 GM/400ML SOLN        rivaroxaban ANTICOAGULANT (XARELTO ANTICOAGULANT) 20 MG TABS tablet Take 1 tablet (20 mg) by mouth daily (with dinner) 90 tablet 0     tiZANidine (ZANAFLEX) 2 " MG tablet Take 1-2 tablets (2-4 mg) by mouth 3 times daily as needed for muscle spasms 50 tablet 1     traMADol (ULTRAM) 50 MG tablet Take 1 tablet (50 mg) by mouth every 6 hours as needed for severe pain 20 tablet 0     venlafaxine (EFFEXOR) 37.5 MG tablet Take 2 tablets (75 mg) by mouth 2 times daily 60 tablet 1     vitamin B-Complex Take 1 tablet by mouth daily 90 tablet 5     vitamin C (ASCORBIC ACID) 1000 MG TABS Take 1 tablet (1,000 mg) by mouth daily 30 tablet 0     vitamin D3 (CHOLECALCIFEROL) 50 mcg (2000 units) tablet TAKE 1 TABLET BY MOUTH DAILY 30 tablet 0     UNABLE TO FIND 2,500 mcg by Oral or Feeding Tube route daily MEDICATION NAME: Vitamin B12       UNABLE TO FIND 5,000 mcg by Oral or Feeding Tube route daily MEDICATION NAME: OTC Biotin       UNABLE TO FIND 1 tablet by Oral or Feeding Tube route daily MEDICATION NAME: OTC Probiotics       UNABLE TO FIND 1 tablet by Oral or Feeding Tube route daily MEDICATION NAME: OTC-Vitamin B complex         Weight Loss Medication History Reviewed With Patient 8/11/2021   Which weight loss medications are you currently taking on a regular basis?  None       Office Visit on 07/13/2021   Component Date Value Ref Range Status     TSH 07/13/2021 0.90  0.40 - 4.00 mU/L Final     N Terminal Pro BNP Outpatient 07/13/2021 121  0 - 125 pg/mL Final    Reference range shown and results flagged as abnormal are for the outpatient, non acute settings. Establishing a baseline value for each individual patient is useful for follow-up.    Suggested inpatient cut points for confirming diagnosis of CHF in an acute setting are:  >450 pg/mL (age 18 to less than 50)  >900 pg/mL (age 50 to less than 75)  >1800 pg/mL (75 yrs and older)    An inpatient or emergency department NT-proPBNP <300 pg/mL effectively rules out acute CHF, with 99% negative predictive value.         Sodium 07/13/2021 140  133 - 144 mmol/L Final     Potassium 07/13/2021 3.9  3.4 - 5.3 mmol/L Final     Chloride  "07/13/2021 108  mmol/L Final     Carbon Dioxide (CO2) 07/13/2021 26  20 - 32 mmol/L Final     Anion Gap 07/13/2021 6  3 - 14 mmol/L Final     Urea Nitrogen 07/13/2021 5* 7 - 30 mg/dL Final     Creatinine 07/13/2021 0.77  mg/dL Final     Calcium 07/13/2021 8.7  8.5 - 10.1 mg/dL Final     Glucose 07/13/2021 110* 70 - 99 mg/dL Final     GFR Estimate 07/13/2021 >90  >60 mL/min/1.73m2 Final    As of July 11, 2021, eGFR is calculated by the CKD-EPI creatinine equation, without race adjustment. eGFR can be influenced by muscle mass, exercise, and diet. The reported eGFR is an estimation only and is only applicable if the renal function is stable.     WBC Count 07/13/2021 5.9  4.0 - 11.0 10e3/uL Final     RBC Count 07/13/2021 3.38* 3.80 - 5.20 10e6/uL Final     Hemoglobin 07/13/2021 12.3  11.7 - 15.7 g/dL Final     Hematocrit 07/13/2021 37.8  35.0 - 47.0 % Final     MCV 07/13/2021 112* 78 - 100 fL Final     MCH 07/13/2021 36.4* 26.5 - 33.0 pg Final     MCHC 07/13/2021 32.5  31.5 - 36.5 g/dL Final     RDW 07/13/2021 13.8  10.0 - 15.0 % Final     Platelet Count 07/13/2021 348  150 - 450 10e3/uL Final       PHYSICAL EXAM:  Objective    Ht 1.702 m (5' 7\")   Wt 131.1 kg (289 lb)   BMI 45.26 kg/m    Vitals - Patient Reported  Pain Score: Severe Pain (7) (back pain)        Physical Exam   healthy, alert and no distress  PSYCH: Alert and oriented times 3; coherent speech, normal   rate and volume, able to articulate logical thoughts, able   to abstract reason, no tangential thoughts, no hallucinations   or delusions  Her affect is normal  RESP: No cough, no audible wheezing, able to talk in full sentences  Remainder of exam unable to be completed due to telephone visits      Sincerely,    Joanne Sterling PA-C      "

## 2021-08-11 NOTE — PROGRESS NOTES
"Hilaria Bonner is a 30 year old female who is being evaluated via a billable telephone visit.      The patient has been notified of following:     \"This telephone visit will be conducted via a call between you and your physician/provider. We have found that certain health care needs can be provided without the need for a physical exam.  This service lets us provide the care you need with a short phone conversation.  If a prescription is necessary we can send it directly to your pharmacy.  If lab work is needed we can place an order for that and you can then stop by our lab to have the test done at a later time.    Telephone visits are billed at different rates depending on your insurance coverage. During this emergency period, for some insurers they may be billed the same as an in-person visit.  Please reach out to your insurance provider with any questions.    If during the course of the call the physician/provider feels a telephone visit is not appropriate, you will not be charged for this service.\"    Patient has given verbal consent for Telephone visit?  Yes    What phone number would you like to be contacted at? [phone]    How would you like to obtain your AVS? MyChart    Phone call duration: 28    During this virtual visit the patient is located in [place], patient verifies this as the location during the entirety of this visit.     Reason For Visit:  Hilaria Bonner is a 30 year old female, completed a virtual visit today for nutrition follow-up s/p SG with Dr Lopez (3/26/19).  Patient referred by Joanne TVOAR.      Anthropometrics  Initial Consult Weight: 315.4 lbs  Day of Surgery Weight(3/26/19): 291.2 lbs    Current Weight:   Estimated body mass index is 45.26 kg/m  as calculated from the following:    Height as of an earlier encounter on 8/11/21: 1.702 m (5' 7\").    Weight as of an earlier encounter on 8/11/21: 131.1 kg (289 lb).        Current Vitamins/Minerals:  MVI with iron 2x/day 500 mg " Calcium Citrate (TID), Vitamin D3 (2000 international unit(s)), B-50 complex     Nutrition History:   August 11 2021  Pt has not seen RD since 12/16/2020    No appetite for ~ 2 weeks, feeling full off of very little amounts    Recall:  Shrimp fried rice   Fluids: water (5-6 bottles)  Does not recall if she ate anything else    Has been trying to cut back on fluids, reports last RD told her she was drinking way too much . Was drinking around  oz. Reports she tries to stay hydrated for treatment and might have to get fluids in the ER if not staying hydrated. Encouraged pt to work on hydration as well as intake     Does not like the taste of protein shake    Wanted to review foods she should/should not be eating. Wants to know what starchy vegetables are.    Reviewed dietary recommendations/guidelines   - Pt recalls protein minimum   - Pt recalls  fluids from 30 min before/after but has not been doing  - Sugar/fat - pt did not remember, informed her of recommendations     Mailed following handouts:   Regular diet, protein sources, meal ideas, site with bariatric plats/utensils, dietary guidelines after bariatric surgery     Physical Activity:  Did not discuss today     Nutrition Prescription:  Grams Protein: 50-60 (minimum)  Amount of Fluid: 48-64 oz    Nutrition Diagnosis   Food and nutrition-related knowledge deficit r/t limited prior exposure to maintenance diet guidelines after bariatric surgery aeb pt unable to verbalize full understanding of maintenance diet guidelines post bariatric surgery.      Intervention  Mailed following handouts:   Regular diet, protein sources, meal ideas, site with bariatric plats/utensils, dietary guidelines after bariatric surgery  Assessed current dietary habits  Nutrition Education on Dietary recommendations   Answered pt questions     Goals:  1) Continue bariatric regular diet.   2) Aim for 3 meals per day   3)Consume 60 grams of protein/day.    - Eat protein foods  first.   4) Sip on 64-96 oz of fluids/day- between meals only (do not drink 30 mins before or after meal or while you're eating)  4) Eat slowly (>20 min/meal), chewing foods well (to applesauce-like consistency).   - Use a baby/toddler fork/spoon and small plate/bowls (should hold 1 cup or less)  5) Limit portions to 3/4 to 1 cup/meal.   6) Take the following supplements:    Multivitamin/minerals: adult dose 2 times daily    Iron: 45-60 mg elemental (18-36 mg if low risk) - may partly or fully be covered in multivitamin     Calcium Citrate containing vitamin D: 500 mg 3 times daily or 600 mg 2 times daily    Vitamin B12: sublingual form of at least 500 mcg daily or injection of 1000 mcg monthly     B-50 Complex once daily    Diet Guidelines after Weight-loss Surgery  [email or web address]     Regular Foods/Meal Ideas  [email or web address]     Protein Sources   [email or web address]     Link to Bariatric Store with Plates/Utensils  [email or web address]      Follow-Up:  1 month, PRN     Time spent with pt: 28 mins  SIL Kate, RD, LD

## 2021-08-12 ENCOUNTER — TELEPHONE (OUTPATIENT)
Dept: SLEEP MEDICINE | Facility: CLINIC | Age: 31
End: 2021-08-12

## 2021-08-12 NOTE — TELEPHONE ENCOUNTER
Reason for Call:  Other appointment    Detailed comments: patient would like to schedule her sleep study tomorrow.  Please contact patient.  Thank you.    Phone Number Patient can be reached at: Home number on file [phone] (home)    Best Time: any    Can we leave a detailed message on this number? YES    Call taken on 8/12/2021 at 2:29 PM by Maddy Gross

## 2021-08-13 ENCOUNTER — OFFICE VISIT (OUTPATIENT)
Dept: FAMILY MEDICINE | Facility: CLINIC | Age: 31
End: 2021-08-13
Payer: COMMERCIAL

## 2021-08-13 ENCOUNTER — TELEPHONE (OUTPATIENT)
Dept: ENDOCRINOLOGY | Facility: CLINIC | Age: 31
End: 2021-08-13

## 2021-08-13 VITALS
OXYGEN SATURATION: 97 % | TEMPERATURE: 98.6 F | BODY MASS INDEX: 44.76 KG/M2 | HEIGHT: 67 IN | RESPIRATION RATE: 16 BRPM | WEIGHT: 285.2 LBS | HEART RATE: 117 BPM | DIASTOLIC BLOOD PRESSURE: 70 MMHG | SYSTOLIC BLOOD PRESSURE: 104 MMHG

## 2021-08-13 DIAGNOSIS — R60.0 BILATERAL LEG EDEMA: Primary | ICD-10-CM

## 2021-08-13 DIAGNOSIS — I10 BENIGN ESSENTIAL HYPERTENSION: ICD-10-CM

## 2021-08-13 DIAGNOSIS — I26.99 PULMONARY EMBOLISM WITH INFARCTION (H): ICD-10-CM

## 2021-08-13 DIAGNOSIS — G62.9 POLYNEUROPATHY: ICD-10-CM

## 2021-08-13 LAB
ANION GAP SERPL CALCULATED.3IONS-SCNC: 4 MMOL/L (ref 3–14)
BUN SERPL-MCNC: 6 MG/DL (ref 7–30)
CALCIUM SERPL-MCNC: 8.9 MG/DL (ref 8.5–10.1)
CHLORIDE BLD-SCNC: 104 MMOL/L (ref 94–109)
CO2 SERPL-SCNC: 29 MMOL/L (ref 20–32)
CREAT SERPL-MCNC: 0.8 MG/DL (ref 0.52–1.04)
GFR SERPL CREATININE-BSD FRML MDRD: >90 ML/MIN/1.73M2
GLUCOSE BLD-MCNC: 119 MG/DL (ref 70–99)
HOLD SPECIMEN: NORMAL
POTASSIUM BLD-SCNC: 4.2 MMOL/L (ref 3.4–5.3)
SODIUM SERPL-SCNC: 137 MMOL/L (ref 133–144)

## 2021-08-13 PROCEDURE — 80048 BASIC METABOLIC PNL TOTAL CA: CPT | Performed by: PHYSICIAN ASSISTANT

## 2021-08-13 PROCEDURE — 36415 COLL VENOUS BLD VENIPUNCTURE: CPT | Performed by: PHYSICIAN ASSISTANT

## 2021-08-13 PROCEDURE — 99214 OFFICE O/P EST MOD 30 MIN: CPT | Performed by: PHYSICIAN ASSISTANT

## 2021-08-13 RX ORDER — PREGABALIN 200 MG/1
200 CAPSULE ORAL 3 TIMES DAILY
Qty: 90 CAPSULE | Refills: 3 | Status: SHIPPED | OUTPATIENT
Start: 2021-08-13 | End: 2022-02-03

## 2021-08-13 RX ORDER — LISINOPRIL 10 MG/1
10 TABLET ORAL DAILY
Qty: 90 TABLET | Refills: 0 | Status: SHIPPED | OUTPATIENT
Start: 2021-08-13 | End: 2021-11-16

## 2021-08-13 ASSESSMENT — PATIENT HEALTH QUESTIONNAIRE - PHQ9
SUM OF ALL RESPONSES TO PHQ QUESTIONS 1-9: 19
10. IF YOU CHECKED OFF ANY PROBLEMS, HOW DIFFICULT HAVE THESE PROBLEMS MADE IT FOR YOU TO DO YOUR WORK, TAKE CARE OF THINGS AT HOME, OR GET ALONG WITH OTHER PEOPLE: EXTREMELY DIFFICULT
SUM OF ALL RESPONSES TO PHQ QUESTIONS 1-9: 19

## 2021-08-13 ASSESSMENT — MIFFLIN-ST. JEOR: SCORE: 2042.32

## 2021-08-13 NOTE — PATIENT INSTRUCTIONS
Stop the hydrochlorothiazide   Follow up with your back specialist  Continue with physical therapy   Return urgently if any change in symptoms.  Follow up with us or go to the emergency department  immediately if chest pain, shortness of breath or other change in symptoms

## 2021-08-13 NOTE — PROGRESS NOTES
Assessment & Plan     Bilateral leg edema  Has improved.  Will recheck electrolytes- has been hypkalemic in past and on potassium supplement   - Basic metabolic panel  (Ca, Cl, CO2, Creat, Gluc, K, Na, BUN)  - Extra Tube  - Extra Tube  - Basic metabolic panel  (Ca, Cl, CO2, Creat, Gluc, K, Na, BUN)    Pulmonary embolism with infarction (H)  Refilled xarelto   - rivaroxaban ANTICOAGULANT (XARELTO ANTICOAGULANT) 20 MG TABS tablet  Dispense: 90 tablet; Refill: 0    Polyneuropathy  Followed by neurology  - Pregabalin (LYRICA) 200 MG capsule  Dispense: 90 capsule; Refill: 3    Benign essential hypertension  Blood pressure at goal.  Refilled lisinopril   - lisinopril (ZESTRIL) 10 MG tablet  Dispense: 90 tablet; Refill: 0  - Extra Tube  - Extra Tube      Review of the result(s) of each unique test - [facility]  Ordering of each unique test  Prescription drug management         Patient Instructions   Stop the hydrochlorothiazide   Follow up with your back specialist  Continue with physical therapy   Return urgently if any change in symptoms.  Follow up with us or go to the emergency department  immediately if chest pain, shortness of breath or other change in symptoms       Return in about 3 months (around 11/13/2021), or if symptoms worsen or fail to improve, for in person.    Crissy Madrigal PA-C  [facility] is a 30 year old who presents for the following health issues     History of Present Illness       She eats 2-3 servings of fruits and vegetables daily.She consumes 3 sweetened beverage(s) daily.She exercises with enough effort to increase her heart rate 20 to 29 minutes per day.  She exercises with enough effort to increase her heart rate 4 days per week.   She is taking medications regularly.       Concern - Swelling in both lower legs  Onset: about a month  Description: Swollen in lower leg,   Intensity: moderate  Progression of Symptoms:  same  Accompanying  "Signs & Symptoms: Rash on lower leg  Previous history of similar problem: none  Precipitating factors:        Worsened by: Siting a long time  Alleviating factors:        Improved by: elevating lower legs  Therapies tried and outcome:  none     Patient well known to me with history of polyneuropathy related to covid, history of massive PE with cardiac arrest on xarelto indefinitely with recent thoracic spine fracture  IV treatments every three weeks- had backed it down to once a month and was falling a lot  Back - have to contact doctor again-pain in middle of back  physical therapy thought that she may be doing too much at home  Wears brace throughout the day when painful- plans to contact doctor and see what he wants to do  Peripheral edema has improved.    Supposed to see me week or two ago -saw Xuan RAMIREZ here and prescribed hydrochlorothiazide without improvement   Doesn't understand why weight jumped up  Plans to follow up with Weight loss provider  On potassium for hypokalemia   Review of Systems   Constitutional, HEENT, cardiovascular, pulmonary, gi and gu systems are negative, except as otherwise noted.      Objective    /70 (BP Location: Right arm, Patient Position: Sitting, Cuff Size: Adult Large)   Pulse 117   Temp 98.6  F (37  C) (Tympanic)   Resp 16   Ht 1.695 m (5' 6.75\")   Wt 129.4 kg (285 lb 3.2 oz)   SpO2 97%   Breastfeeding No   BMI 45.00 kg/m    Body mass index is 45 kg/m .  Physical Exam   GENERAL: alert, no distress and obese  NECK: no adenopathy, no asymmetry, masses, or scars and thyroid normal to palpation  RESP: lungs clear to auscultation - no rales, rhonchi or wheezes  CV: regular rate and rhythm, normal S1 S2, no S3 or S4, no murmur, click or rub, no peripheral edema and peripheral pulses strong  ABDOMEN: soft, nontender, no hepatosplenomegaly, no masses and bowel sounds normal  MS: no edema    Results for orders placed or performed in visit on 08/13/21   Basic metabolic panel "  (Ca, Cl, CO2, Creat, Gluc, K, Na, BUN)     Status: Abnormal   Result Value Ref Range    Sodium 137 133 - 144 mmol/L    Potassium 4.2 3.4 - 5.3 mmol/L    Chloride 104 94 - 109 mmol/L    Carbon Dioxide (CO2) 29 20 - 32 mmol/L    Anion Gap 4 3 - 14 mmol/L    Urea Nitrogen 6 (L) 7 - 30 mg/dL    Creatinine 0.80 0.52 - 1.04 mg/dL    Calcium 8.9 8.5 - 10.1 mg/dL    Glucose 119 (H) 70 - 99 mg/dL    GFR Estimate >90 >60 mL/min/1.73m2   Extra Red Top Tube     Status: None   Result Value Ref Range    Hold Specimen JIC    Extra Green Top (Lithium Heparin) Tube     Status: None   Result Value Ref Range    Hold Specimen JIC    Extra Purple Top Tube     Status: None   Result Value Ref Range    Hold Specimen JIC    Extra Tube     Status: None    Narrative    The following orders were created for panel order Extra Tube.  Procedure                               Abnormality         Status                     ---------                               -----------         ------                     Extra Red Top Tube[[id number]]                               Final result               Extra Green Top (Lithium...[[id number]]                      Final result               Extra Purple Top Tube[[id number]]                            Final result                 Please view results for these tests on the individual orders.               Answers for HPI/ROS submitted by the patient on 8/13/2021  If you checked off any problems, how difficult have these problems made it for you to do your work, take care of things at home, or get along with other people?: Extremely difficult  PHQ9 TOTAL SCORE: 19

## 2021-08-15 NOTE — RESULT ENCOUNTER NOTE
Dear Hilaria  Your electrolytes and kidney function were normal.   Continue potassium supplement as you have been taking.   Please call or MyChart my office with any questions or concerns.   Crissy Madrigal, PAC

## 2021-08-17 ENCOUNTER — TELEPHONE (OUTPATIENT)
Dept: PSYCHOLOGY | Facility: CLINIC | Age: 31
End: 2021-08-17

## 2021-08-17 NOTE — TELEPHONE ENCOUNTER
Pt states she is returning a call to the nursing team.  She was on the other line when they reached out the first time.    [phone]

## 2021-08-17 NOTE — TELEPHONE ENCOUNTER
Patient Status:  The patient is being referred to long term community psychiatry care and provider will provide bridging until patient is established with new community provider.      Called patient to inform her of the above. She was on the phone with another provider. Wants RN to call back-she also said she will call back.

## 2021-08-17 NOTE — TELEPHONE ENCOUNTER
Reason for Call:  Other call back    Detailed comments: med discussion, can you possibly connect with patient prior to, appt sched 09/02/21        Phone Number Patient can be reached at: Home number on file [phone] (home)    Best Time: when available    Can we leave a detailed message on this number? YES    Call taken on 8/17/2021 at 11:36 AM by Flroi Longoria

## 2021-08-17 NOTE — TELEPHONE ENCOUNTER
"I spoke with Hilaria by phone.  She is confused about olanzapine, as we had some concern about it causing weight gain.  Last time she had an appointment, she had significant edema, so we wanted to be sure that had resolved before weight comparisons were made.    In January 2021, she weighed 253 pounds, by June she was at 272, and she now weighs 285.    We will discuss other options for an antipsychotic besides olanzapine at her appt on Sept. 2.  She reports she has \" no appetite\" now, even with olanzapine.    Sheree Matute MD  Collaborative Care Psychiatry  [facility]    "

## 2021-08-17 NOTE — TELEPHONE ENCOUNTER
Returned call to patient. Patient needed clarification on the dosage/medications Dr. Matute has her on. RN clarify as much as possibly, patient requesting to speak with Dr. Matute if possible for a few minutes today or something during this week. Reports being confuse about medications she is supposed to start and take. Will send [employer] message to patient with recent med scripts.

## 2021-08-18 ENCOUNTER — TELEPHONE (OUTPATIENT)
Dept: PSYCHOLOGY | Facility: CLINIC | Age: 31
End: 2021-08-18

## 2021-08-18 NOTE — TELEPHONE ENCOUNTER
Please refuse refill request for 5 mg olanzapine.  I spoke with patient yesterday and we agreed to not make any changes until her appointment.  She is easily confused.    Thanks,  Sheree Matute MD  Collaborative Care Psychiatry  [facility]

## 2021-08-18 NOTE — TELEPHONE ENCOUNTER
Called pharm spoke with staff let them know to refuse refill per provider.     Faxed back to pharm denied.     Tiffany Olsen on 8/18/2021 at 3:28 PM

## 2021-08-18 NOTE — TELEPHONE ENCOUNTER
"Rc'd refill request for Olanzapine 5 mg from [facility] in [place].     Only current order for Olanzapine in chart is for 10 mg. 5mg order dc'd on 7/14/21.    Called pharm and they stated pt picked up 10 mg order yesterday  8/17/21.     Per provider note \"We will discuss other options for an antipsychotic besides olanzapine at her appt on Sept. 2.\"    Called pt LVM for more information. Will route to provider for directives.     Tiffany Olsen on 8/18/2021 at 2:31 PM      "

## 2021-08-19 ENCOUNTER — VIRTUAL VISIT (OUTPATIENT)
Dept: PALLIATIVE MEDICINE | Facility: CLINIC | Age: 31
End: 2021-08-19
Payer: COMMERCIAL

## 2021-08-19 DIAGNOSIS — Z53.9 NO SHOW: Primary | ICD-10-CM

## 2021-08-19 NOTE — PATIENT INSTRUCTIONS
----------------------------------------------------------------  [facility] Number:  [phone]     Call with any questions about your care and for scheduling assistance.     Calls are returned Monday through Friday between 8 AM and 4:30 PM. We usually get back to you within 2 business days depending on the issue/request.    If we are prescribing your medications:    For opioid medication refills, call the clinic or send a [employer] message 7 days in advance.  Please include:    Name of requested medication    Name of the pharmacy.    For non-opioid medications, call your pharmacy directly to request a refill. Please allow 3-4 days to be processed.     Per [place] State Law:    All controlled substance prescriptions must be filled within 30 days of being written.      For those controlled substances allowing refills, pickup must occur within 30 days of last fill.      We believe regular attendance is key to your success in our program!      Any time you are unable to keep your appointment we ask that you call us at least 24 hours in advance to cancel.This will allow us to offer the appointment time to another patient.     Multiple missed appointments may lead to dismissal from the clinic.

## 2021-08-23 ENCOUNTER — HOME INFUSION (PRE-WILLOW HOME INFUSION) (OUTPATIENT)
Dept: PHARMACY | Facility: CLINIC | Age: 31
End: 2021-08-23

## 2021-08-25 ENCOUNTER — HOME INFUSION (PRE-WILLOW HOME INFUSION) (OUTPATIENT)
Dept: PHARMACY | Facility: CLINIC | Age: 31
End: 2021-08-25

## 2021-08-26 NOTE — PROGRESS NOTES
This is a recent snapshot of the patient's [facility] medical record.  For current drug dose and complete information and questions, call [phone] or In Basket pool, [facility] ([id number])  CSN Number:  [id number]

## 2021-08-27 ENCOUNTER — THERAPY VISIT (OUTPATIENT)
Dept: PHYSICAL THERAPY | Facility: CLINIC | Age: 31
End: 2021-08-27
Payer: COMMERCIAL

## 2021-08-27 DIAGNOSIS — M54.6 BILATERAL THORACIC BACK PAIN, UNSPECIFIED CHRONICITY: Primary | ICD-10-CM

## 2021-08-27 PROCEDURE — 97110 THERAPEUTIC EXERCISES: CPT | Mod: GP | Performed by: PHYSICAL THERAPIST

## 2021-08-27 PROCEDURE — 97112 NEUROMUSCULAR REEDUCATION: CPT | Mod: GP | Performed by: PHYSICAL THERAPIST

## 2021-08-30 ENCOUNTER — VIRTUAL VISIT (OUTPATIENT)
Dept: PALLIATIVE MEDICINE | Facility: CLINIC | Age: 31
End: 2021-08-30
Payer: COMMERCIAL

## 2021-08-30 DIAGNOSIS — M62.838 MUSCLE SPASM: ICD-10-CM

## 2021-08-30 DIAGNOSIS — R20.2 PARESTHESIAS: ICD-10-CM

## 2021-08-30 DIAGNOSIS — G61.81 CHRONIC INFLAMMATORY DEMYELINATING POLYNEUROPATHY (H): Primary | ICD-10-CM

## 2021-08-30 PROCEDURE — 96158 HLTH BHV IVNTJ INDIV 1ST 30: CPT | Mod: 95 | Performed by: PSYCHOLOGIST

## 2021-08-30 NOTE — PROGRESS NOTES
Hilaria is a 30 year old who is being evaluated via a billable video visit.      How would you like to obtain your AVS? MyChart  If the video visit is dropped, the invitation should be resent by: Text to cell phone: [phone]  Will anyone else be joining your video visit? No   Is Pt currently in [place]? Yes    [facility]      Video Start Time: 8:34 AM      [facility]     Date of visit: 8/31/2021      Assessment:   Hilaria Bonner is a 30 year old female with a past medical history significant for PE with associated PEA arrest, morbid obesity s/p sleeve gastrectomy, pancreatitis, and recent COVID 19 who presents with complaints of upper and lower extremity pain.      1. Upper and lower extremity pain- etiology may be an underlying autoimmune condition triggered by COVID polyneuritis, undergoing evaluation and treatment with neurology.  2. Alcohol dependence: has referral to see addiction medicine.    3. Mental Health - the patient's mental health concerns, specifically anxiety, affect her experience of pain and contribute to her clinically significant distress.    Visit Diagnoses:  1. Chronic inflammatory demyelinating polyneuropathy (H)    2. Chronic pain syndrome        Plan:     1.  Pain Physical Therapy:     YES   I would recommend continuing physical therapy per neurosurgery- Thad TOVAR.    2.  Pain Psychologist to address relaxation, behavioral change, coping style, and other factors important to improvement.    Encouraged Hilaria continue to work with psychiatry, Dr. Matute. She has an appointment with psychology, Dr. Torres scheduled in October. She could consider meeting with a health psychologist instead if wanted to be seen sooner.   3.  Medication Management:     1. Hilaria tapered off of Cymbalta and started Effexor for 2 weeks but then stopped as she was confused about which medications she should be taking. She is  interested in trying Effexor again. She will restart Effexor 37.5mg daily. Monitor pain benefit. If tolerated well, will increase in the future. Follow up with Dr. Matute for additional medication management.     2. Continue Lyrica 200mg TID.     3. Can continue tizanidine 4mg two times daily prn for now.     4. Continue medical cannabis as able.   4.  Potential procedures: not at this time.      5.  Can stop TENS unit if not helping.    6.  Follow up with RG Puri CNP in 6-8 weeks.     Janki DIEZ CNP  [facility]     -------------------------------------------------    Subjective:    Chief complaint:   Chief Complaint   Patient presents with     Pain       Interval history:  Hilaria Bonner is a 30 year old female last seen on 6/10/2021.  She is seen in follow up.     Recommendations/plan at the last visit included:   1.  Pain Physical Therapy:     YES   Would recommend restarting physical therapy once she receives approval from neurosurgery- Thad TOVAR.    2.  Pain Psychologist to address relaxation, behavioral change, coping style, and other factors important to improvement.    Continue to work with psychiatry, Dr. Matute.   Establish with health psychologist, providers listed below.   Would recommend having a consult with neuropsychiatry as recommended by Dr. Matute.    3.  Medication Management:     1. Decrease in Cymbalta to 90mg did not cause an increase in pain. We will continue trying to reduce/eliminate medications that are not helping. Decrease Cymbalta to 60mg daily. She will monitor pain. If not worsening, we will likely decrease to 30mg and then stop.    2. Discussed trying one more topical- a compounded pain cream. Diclofenac 5%, Baclofen 2%, Bupivicaine 1%, Ibuprofen 3%, Pentoxifylline 3%- apply 1-2g 3-4x daily as needed for pain. If this is not covered by insurance or too expensive, do not recommend additional creams.    3. Can continue  "tizanidine 4mg BID prn for now.     4. Continue medical cannabis- advised she discuss with pharmacist at the dispensary if they have any recommendations.     5. I will discuss with Dr. Matute about adding on a different antidepressant- specifically considering transition to Effexor.     4.  Potential procedures: not at this time.      5.  Referrals: addiction medicine referral placed back in February/March. Hilaria never made appointment as she feels she has been doing well but we discussed the importance of maintaining sobriety. I would recommend having a consult with addiction medicine provider and she will consider.    6.  Follow up with RG Puri CNP in 6-8 weeks.          Since her last visit, Hilaria Bonner reports:  -Her pain is about the same as it was at last visit.  -She states since the onset of her pain it has not changed significantly though she feels like she is managing it better, \"I'm so used to it.\" She isn't sure what to attribute it to be due to.   -She tapered off of Cymbalta as directed and started Effexor for about 2 weeks. She became confused about which medications she should be taking so discontinued the venlafaxine. She is open to trying it again.   -She continues to struggle with mid back pain, is following with Thad TOVAR. She has follow up planned soon. She recently had another fall and worries that she injured her back again.  -She continues to follow with Neurology, Dr. Chua. Her IVIG treatments were increased in frequency.   -She continues Lyrica 200mg TID with some benefit.  -She recently restarted medical cannabis with some benefit (expense is limiting), \"it mellows me out.\"   -She had a follow up visit with Jasmin Prince PsyD, LP yesterday, thought that she should check in. She has an appointment with a psychologist, Dr. Torres.        Current pain medications:               Lyrica 200mg TID- [facility] [place], \"a little bit,\" more effective than gabapentin     Effexor " "37.5mg daily- ? took for only 2 weeks then stopped   tizanidine 4mg TID prn- [place], taking BID prn              Hydroxyzine 25-50mg prn- H for anxiety              Folate 1mg daily                       Medical cannabis (THC dominant vapor and pills) - H with sleep but not pain, \"it just mellows me out\"               Zyprexa 5mg at bedtime prn- ?, Seroquel seemed somewhat more effective   Prazosin 1mg at bedtime- [facility] [place], sleeping better    Current MME: 0    Review of [place] Prescription Monitoring Program (): No concern for abuse or misuse of controlled medications based on this report.     Annual Controlled Substance Agreement/UDS due date: NA    Past pain treatments:  1. Previous Pain Relevant Medications:              Opiates: Tramadol- [facility], oxycodone- ?/SW              NSAIDS: doesn't take anti-inflammatories due to gastric bypass              Muscle Relaxants: tizanidine- H for sleep only, Robaxin- [place]              Anti-migraine mediations: no              Anti-depressants: amitriptyline (up to 75mg)- ?, \"it put me to sleep\", Cymbalta- ?, Wellbutrin- [place]              Sleep aids: no              Anxiolytics: hydroxyzine- H               Neuropathics: Lyrica- [facility] [place], gabapentin (started on for numbness in toes and migraines after cardiac arrest)- [place], fatigue, Topamax- H for weight loss, NH for pain              Topicals: gabapentin cream- NH/SW, lidocaine cream- NH, W, burning, capsaicin cream- NH, W, burning              Other medications not covered above: Tylenol- [place]     2. Physical Therapy: going to neuro PT and OT   3. Pain Psychology: yes Jasmin Prince PsyD - [facility] [place]  4. Surgery: gastric bypass March 2019  5. Injections: no  6. Chiropractic: no  7. Acupuncture: yes x4 sessions with José Kunz DC - [place]  8. TENS Unit: yes- [place], still uses on a regular basis.     Medications:  Current Outpatient Medications   Medication Sig Dispense Refill     Blood Pressure Monitoring (BLOOD PRESSURE MONITOR/ARM) BRAYAN        " COMPOUNDED NON-CONTROLLED SUBSTANCE (CMPD RX) - PHARMACY TO MIX COMPOUNDED MEDICATION Apply 1-2 g topically 3 times daily as needed (pain) Apply three times daily as needed for pain. 120 g 1     diphenhydrAMINE (BENADRYL) 50 MG/ML injection        folic acid (FOLVITE) 1 MG tablet Take 1 tablet (1 mg) by mouth daily 30 tablet 11     hydrochlorothiazide (HYDRODIURIL) 12.5 MG tablet Take 1 tablet (12.5 mg) by mouth daily 30 tablet 1     hydrOXYzine (ATARAX) 25 MG tablet Take 25 mg by mouth daily as needed for itching       lisinopril (ZESTRIL) 10 MG tablet Take 1 tablet (10 mg) by mouth daily 90 tablet 0     Multiple Vitamins-Minerals (MULTIVITAMIN ADULT) CHEW Take 1 chew tab by mouth 2 times daily 60 tablet 11     [facility] MEDICAL CANABIS       OLANZapine (ZYPREXA) 10 MG tablet Take 1 tablet (10 mg) by mouth At Bedtime 30 tablet 1     omeprazole (PRILOSEC) 20 MG DR capsule Take 1 capsule (20 mg) by mouth daily 28 capsule 11     ondansetron (ZOFRAN-ODT) 4 MG ODT tab Take 1 tablet (4 mg) by mouth every 8 hours as needed for nausea 30 tablet 1     polyethylene glycol (MIRALAX) 17 GM/SCOOP powder Take 17 g (1 capful) by mouth daily 850 g 3     potassium chloride ER (KLOR-CON M) 20 MEQ CR tablet Take 1 tablet (20 mEq) by mouth 2 times daily 180 tablet 0     prazosin (MINIPRESS) 1 MG capsule Take 2 capsules (2 mg) by mouth At Bedtime 60 capsule 2     Pregabalin (LYRICA) 200 MG capsule Take 1 capsule (200 mg) by mouth 3 times daily 90 capsule 3     PRIVIGEN 20 GM/200ML SOLN        PRIVIGEN 40 GM/400ML SOLN        rivaroxaban ANTICOAGULANT (XARELTO ANTICOAGULANT) 20 MG TABS tablet Take 1 tablet (20 mg) by mouth daily (with dinner) 90 tablet 0     tiZANidine (ZANAFLEX) 2 MG tablet Take 1-2 tablets (2-4 mg) by mouth 3 times daily as needed for muscle spasms 50 tablet 1     traMADol (ULTRAM) 50 MG tablet Take 1 tablet (50 mg) by mouth every 6 hours as needed for severe pain 20 tablet 0     UNABLE TO FIND 2,500 mcg by Oral or  Feeding Tube route daily MEDICATION NAME: Vitamin B12       UNABLE TO FIND 5,000 mcg by Oral or Feeding Tube route daily MEDICATION NAME: OTC Biotin       UNABLE TO FIND 1 tablet by Oral or Feeding Tube route daily MEDICATION NAME: OTC Probiotics       UNABLE TO FIND 1 tablet by Oral or Feeding Tube route daily MEDICATION NAME: OTC-Vitamin B complex       venlafaxine (EFFEXOR) 37.5 MG tablet Take 2 tablets (75 mg) by mouth 2 times daily 60 tablet 1     vitamin B-Complex Take 1 tablet by mouth daily 90 tablet 5     vitamin C (ASCORBIC ACID) 1000 MG TABS Take 1 tablet (1,000 mg) by mouth daily 30 tablet 0     vitamin D3 (CHOLECALCIFEROL) 50 mcg (2000 units) tablet TAKE 1 TABLET BY MOUTH DAILY 30 tablet 0       Medical History: any changes in medical history since they were last seen? No    Review of Systems: A 10-point review of systems was negative, with the exception of chronic pain issues and numbness and tingling.     Objective:    Physical Exam:  Constitutional: Well developed, well nourished, appears stated age. Obese.   HEENT: Head atraumatic, normocephalic. Eyes without conjunctival injection or jaundice. Oropharynx clear. Neck supple. No obvious neck masses.  Skin: No rash, lesions, or petechiae of exposed skin.   Psychiatric/mental status: Alert, without lethargy or stupor. Speech fluent. Appropriate affect. Mood normal. Able to follow commands without difficulty.     Extreme Pain (8)      Video-Visit Details    Type of service:  Video Visit    Video End Time: 9:01 AM    Originating Location (pt. Location): Home    Distant Location (provider location):  [facility] [place]     Platform used for Video Visit: LogRhythm    BILLING TIME DOCUMENTATION:   The total TIME spent on this patient on the date of the encounter/appointment was 35 minutes.      TOTAL TIME includes:   Time spent preparing to see the patient (reviewing records and tests)   Time spent face to face (or over the phone)  with the patient   Time spent ordering tests, medications, procedures and referrals   Time spent Referring and communicating with other healthcare professionals   Time spent documenting clinical information in Epic

## 2021-08-30 NOTE — PROGRESS NOTES
"Hilaria Bonner is a 30 year old female who is being evaluated via a billable telephone visit.      The patient has been notified of following:     \"This telephone visit will be conducted via a call between you and Jasmin Prince PsyD LP. We have found that certain health care needs can be provided without the need for an in-person session.  This service lets us provide the care you need with a phone conversation.       If during the course of the call I feel a telephone visit is not appropriate, you will not be charged for this service.\"      Reviewed that patient is in a quiet private place, no recording without permission, all apps and notifications of any devices are turned off. Began the session by talking about the risks and benefits of telehealth, what to do if there is a break in the connection, reviewed the safety protocol for during and after sessions. The purpose of using telehealth for this session was due to the COVID-19 pandemic and was to reduce the spread of the disease and protect both the clinician and client.             Patient has given verbal consent for telephone visit? YES    Phone call contact time  Call Started at 9:00 AM  Call Ended at 9:17 AM  Total 17 minutes     Pain Diagnoses per pain provider:   Chronic inflammatory demyelinating polyneuropathy (H)      Paresthesias      Muscle spasm            DATA: During today's visit you reported the following: this is first appointment since April, with a cancellation and no show since then.     You identified that you would like to focus on the following or had questions regarding the following issues or concerns, and we discussed the following:   - 'I just wanted to check in'  - have psychiatry appointment coming up  - using word search puzzles, working out, listening to music  - report some auditory hallucinations - working on this with psychiatry  - not sure you like the Olanzapine - encouraged to discuss with psychiatric provider  - need " to be tested for sleep apnea - still struggling to sleep well due to dreams  - proud you have been able to maintain sobriety  - have had several deaths  - engaging in self-care 2-3 times daily  - using TENS unit - not really helping    ASSESSMENT: Hilaria reports she felt it was important to check in - discussed that she seems to have robust mental health support currently, and is encouraged to continue to work with them as primary supports, may check in as needed while working with pain clinic.    PLAN:   Your next appointment is scheduled for n/a - PRN sessions.  Assignment/Objectives /interventions for next session:   - continue to work with mental health team to address mental health needs with them.    Telephone-Visit Details    Type of service:  Telephone Visit    Originating Location (pt. Location): Home    Distant Location (provider location):  [place] [facility]     Mode of Communication:  Telephone    I have reviewed the note as documented above.  This accurately captures the substance of my conversation with the patient.    Jasmin Prince PsyD LP  Licensed Psychologist  Outpatient Clinic Therapist  [facility]    Disclaimer: This note consists of symbols derived from keyboarding, dictation and/or voice recognition software. As a result, there may be errors in the script that have gone undetected. Please consider this when interpreting information found in this chart.

## 2021-08-31 ENCOUNTER — VIRTUAL VISIT (OUTPATIENT)
Dept: PALLIATIVE MEDICINE | Facility: CLINIC | Age: 31
End: 2021-08-31
Payer: COMMERCIAL

## 2021-08-31 ENCOUNTER — HOME INFUSION (PRE-WILLOW HOME INFUSION) (OUTPATIENT)
Dept: PHARMACY | Facility: CLINIC | Age: 31
End: 2021-08-31

## 2021-08-31 ENCOUNTER — TELEPHONE (OUTPATIENT)
Dept: NEUROSURGERY | Facility: CLINIC | Age: 31
End: 2021-08-31

## 2021-08-31 DIAGNOSIS — S22.000D COMPRESSION FRACTURE OF THORACIC VERTEBRA WITH ROUTINE HEALING, UNSPECIFIED THORACIC VERTEBRAL LEVEL, SUBSEQUENT ENCOUNTER: Primary | ICD-10-CM

## 2021-08-31 DIAGNOSIS — G61.81 CHRONIC INFLAMMATORY DEMYELINATING POLYNEUROPATHY (H): Primary | ICD-10-CM

## 2021-08-31 DIAGNOSIS — G89.4 CHRONIC PAIN SYNDROME: ICD-10-CM

## 2021-08-31 PROCEDURE — 99214 OFFICE O/P EST MOD 30 MIN: CPT | Mod: 95 | Performed by: NURSE PRACTITIONER

## 2021-08-31 ASSESSMENT — PAIN SCALES - GENERAL: PAINLEVEL: EXTREME PAIN (8)

## 2021-08-31 NOTE — TELEPHONE ENCOUNTER
VM left for pt to call back at earliest convenience to discuss if she has had any improvement with PT. If not, then MRI to be ordered and pt will need an in-person follow-up appt. If pt has some improvement, then follow-up would not be necessary at this time per provider note.       Mellisa Lucero, RNCC  Neurology

## 2021-08-31 NOTE — TELEPHONE ENCOUNTER
Please pend a thoracic MRI without contrast and please arrange for clinic visit once it is complete.  Thank you

## 2021-08-31 NOTE — PATIENT INSTRUCTIONS
1.  Pain Physical Therapy:     YES   Would recommend restarting physical therapy once she receives approval from neurosurgery- Thda TOVAR.    2.  Pain Psychologist to address relaxation, behavioral change, coping style, and other factors important to improvement.    Continue to work with psychiatry, Dr. Matute. Have appointment with psychology  Dr. Torres. Could consider meeting with a health psychologist instead if want to be seen sooner.   3.  Medication Management:     1. Start Effexor 37.5mg daily. Monitor pain benefit.     2. Continue Lyrica 200mg three times daily.    3. Can continue tizanidine 4mg two times daily prn for now.     4. Continue medical cannabis as able.   4.  Potential procedures: not at this time.      5.  Can stop TENS unit if not helping.    6.  Follow up with RG Puri CNP in 6-8 weeks.     Please select a provider from the following list and call to request an appointment.     Health Psychologists in the [facility] [place]   Gaye Roe, Ph.D., L.P. [phone]   Dee Lindsey Ph.D. [phone]   Yessy Emerson, Ph.D., L.P. [phone]             Channing Martinez, Ph.D., A.B.P.P., L.P. [phone]   Michelle Early, Ph.D., L.P. [phone]     If you want to read about any of us:   [email or web address]   [email or web address]/[email or web address]   [email or web address]   [email or web address]/[email or web address]     [email or web address]             ----------------------------------------------------------------  Clinic Number:  [phone]     Call with any questions about your care and for scheduling assistance.     Calls are returned Monday through Friday between 8 AM and 4:30 PM. We usually get back to you within 2 business days depending on the issue/request.    If we are prescribing your medications:    For  opioid medication refills, call the clinic or send a GliaCurehart message 7 days in advance.  Please include:    Name of requested medication    Name of the pharmacy.    For non-opioid medications, call your pharmacy directly to request a refill. Please allow 3-4 days to be processed.     Per [place] State Law:    All controlled substance prescriptions must be filled within 30 days of being written.      For those controlled substances allowing refills, pickup must occur within 30 days of last fill.      We believe regular attendance is key to your success in our program!      Any time you are unable to keep your appointment we ask that you call us at least 24 hours in advance to cancel.This will allow us to offer the appointment time to another patient.     Multiple missed appointments may lead to dismissal from the clinic.

## 2021-08-31 NOTE — TELEPHONE ENCOUNTER
Pt reports that her back pain has not improved and has recently gotten worse after she fell yesterday in her home. Reports that her legs buckled and gave out when she was going downstairs. Fell down about 4-5 steps. States that her back is worse but back was still present prior to this fall. Pain is now 9-10 in middle back and tailbone. Pt cannot fully recall how many sessions of PT she has been to recently as she did not go to PT for about 2 months. Per PT notes, pt was seen only once (8/5) since her last visit with the provider (7/23). Pt requesting for MRI to be ordered as recommended at last visit and hopefully get this done prior to her follow-up appt on 9/17. Routed to provider to advise.       Mellisa Lucero, RNCC  Neurology

## 2021-08-31 NOTE — TELEPHONE ENCOUNTER
[facility]    Phone Message    May a detailed message be left on voicemail: yes     Reason for Call: Patient calling wanting to see if the provider would put in a order for a MRI instead of coming back in for a appointment. Patient stated a message can be left on her voicemail. Please advise. Thank you    Action Taken: Message routed to:  Adult Clinics: Neurology p [id number]    Travel Screening: Not Applicable

## 2021-09-01 NOTE — PROGRESS NOTES
This is a recent snapshot of the patient's [facility] medical record.  For current drug dose and complete information and questions, call [phone] or In Basket pool, [facility] home infusion ([id number])  CSN Number:  [id number]

## 2021-09-01 NOTE — TELEPHONE ENCOUNTER
Pt informed that MRI thoracic spine was ordered. Pt given number to call Imaging scheduling line to make appt prior to her follow-up on 9/17 with Thad Vázquez. Pt verbalized understanding and will call to get her MRI scheduled prior to her follow-up appt.       Mellisa Lucero, RNCC  Neurology

## 2021-09-02 ENCOUNTER — VIRTUAL VISIT (OUTPATIENT)
Dept: BEHAVIORAL HEALTH | Facility: CLINIC | Age: 31
End: 2021-09-02
Payer: COMMERCIAL

## 2021-09-02 ENCOUNTER — VIRTUAL VISIT (OUTPATIENT)
Dept: PSYCHIATRY | Facility: CLINIC | Age: 31
End: 2021-09-02
Payer: COMMERCIAL

## 2021-09-02 ENCOUNTER — TELEPHONE (OUTPATIENT)
Dept: PSYCHIATRY | Facility: CLINIC | Age: 31
End: 2021-09-02

## 2021-09-02 DIAGNOSIS — G31.89 COGNITIVE AND NEUROBEHAVIORAL DYSFUNCTION FOLLOWING BRAIN INJURY (H): ICD-10-CM

## 2021-09-02 DIAGNOSIS — F32.1 MODERATE MAJOR DEPRESSION (H): ICD-10-CM

## 2021-09-02 DIAGNOSIS — F29 PSYCHOSIS, UNSPECIFIED PSYCHOSIS TYPE (H): Primary | ICD-10-CM

## 2021-09-02 DIAGNOSIS — F09 COGNITIVE AND NEUROBEHAVIORAL DYSFUNCTION FOLLOWING BRAIN INJURY (H): ICD-10-CM

## 2021-09-02 DIAGNOSIS — F41.1 GENERALIZED ANXIETY DISORDER: ICD-10-CM

## 2021-09-02 DIAGNOSIS — G61.81 CHRONIC INFLAMMATORY DEMYELINATING POLYNEUROPATHY (H): ICD-10-CM

## 2021-09-02 DIAGNOSIS — F41.1 GAD (GENERALIZED ANXIETY DISORDER): ICD-10-CM

## 2021-09-02 DIAGNOSIS — F32.1 MODERATE MAJOR DEPRESSION (H): Primary | ICD-10-CM

## 2021-09-02 DIAGNOSIS — S06.9XAS COGNITIVE AND NEUROBEHAVIORAL DYSFUNCTION FOLLOWING BRAIN INJURY (H): ICD-10-CM

## 2021-09-02 PROCEDURE — 90832 PSYTX W PT 30 MINUTES: CPT | Mod: 95 | Performed by: SOCIAL WORKER

## 2021-09-02 PROCEDURE — 99215 OFFICE O/P EST HI 40 MIN: CPT | Mod: 95 | Performed by: PSYCHIATRY & NEUROLOGY

## 2021-09-02 RX ORDER — MEDROXYPROGESTERONE ACETATE 150 MG/ML
150 INJECTION, SUSPENSION INTRAMUSCULAR
COMMUNITY
End: 2023-04-18

## 2021-09-02 RX ORDER — VENLAFAXINE HYDROCHLORIDE 150 MG/1
150 CAPSULE, EXTENDED RELEASE ORAL DAILY
Qty: 30 CAPSULE | Refills: 2 | Status: SHIPPED | OUTPATIENT
Start: 2021-09-02 | End: 2021-12-06

## 2021-09-02 RX ORDER — HYDROXYZINE HYDROCHLORIDE 25 MG/1
25 TABLET, FILM COATED ORAL EVERY 6 HOURS PRN
Qty: 120 TABLET | Refills: 0 | Status: SHIPPED | OUTPATIENT
Start: 2021-09-02 | End: 2021-10-06

## 2021-09-02 RX ORDER — VENLAFAXINE HYDROCHLORIDE 75 MG/1
75 CAPSULE, EXTENDED RELEASE ORAL DAILY
Qty: 7 CAPSULE | Refills: 0 | Status: SHIPPED | OUTPATIENT
Start: 2021-09-02 | End: 2021-10-12

## 2021-09-02 NOTE — Clinical Note
Kait Lan and Crissy,    Hilaria has been confused about her venlafaxine dose, and reports she was only taking 37.5 mg once daily.  I am tapering up to 150 mg daily and switched her to the extended release formulation.  I hope that's ok!    She is being transferred to Dr. Torres, a neuropsychiatrist.    Thanks,    Sheree Matute MD  Collaborative Care Psychiatry  [facility]

## 2021-09-02 NOTE — PATIENT INSTRUCTIONS
Increase venlafaxine ER to 75 mg at bedtime for one week, then increase to 150 mg at bedtime.    Continue olanzapine 10 mg daily.       Continue prazosin 2 mg at bedtime for nightmares.    Change Hydroxyzine to 25 mg 4 times a day as needed for anxiety.  You may take it at bedtime if it helps with sleep.    Please consult with your primary care provider regarding your increased falls, getting some adaptive equipment and housing resources that are handicapped accessible.     Continue all other medications per your primary care provider.    Your psychiatric care is being transferred to Dr. Torres at the [facility] psychiatric clinic in [place], as he specialized in neuropsychiatry.    It has been a pleasure working with you, best wishes!    [facility] Resources:      Go to the Emergency Department as needed or call after hours crisis line at [phone].      To schedule individual or family therapy, call [facility] at [phone].     Follow up with primary care provider as planned or sooner for acute medical concerns.    Call the psychiatric nurse line with medication questions or concerns at [phone].    [employer] may be used to communicate with your provider, but this is not intended to be used for emergencies.    Community Resources:      National Suicide Prevention Lifeline: 155.633.8023 (TTY: 847.989.4034). Call anytime for help.  (www.suicidepreventionlifeline.org)    National [place] [employer] Mental Illness (www.joe.org): 797.933.9970 or 003-560-5813.     Mental Health Association (www.mentalhealth.org): 663.131.1703 or 660-101-7496.    [place] Crisis Text Line: Text [place] to 258922    Suicide LifeLine Chat: suicidepreConcealium Softwareline.org/chat

## 2021-09-02 NOTE — TELEPHONE ENCOUNTER
Hilaria is being transferred to the care of Dr. Torres at [facility] as of October 6, 2021.  Please discharge [facility] from [facility] as of that date.    Thank you,    Sheree Matute MD  Collaborative Care Psychiatry  [facility]

## 2021-09-02 NOTE — PROGRESS NOTES
Hilaria is a 30 year old who is being evaluated via a billable telephone visit.      What phone number would you like to be contacted at? [phone]  How would you like to obtain your AVS? [place]          Outpatient Psychiatric Progress Note    Name: Hilaria Bonner   : 1990                    Primary Care Provider: Crissy Madrigal PA-C   Therapist: Referred to medical psychologist      PHQ-9 scores:  PHQ-9 SCORE 2021   PHQ-9 Total Score [facility] 20 (Severe depression) 19 (Moderately severe depression) 20 (Severe depression)   PHQ-9 Total Score 20 19 20   PHQ-A Total Score - - -       MELODY-7 scores:  MELODY-7 SCORE 2021   Total Score 11 (moderate anxiety) 11 (moderate anxiety) 15 (severe anxiety)   Total Score 11 11 15       Patient Identification:  Hilaria is a 30 year old year old female  who presents for return visit with me.  Patient attended the session alone.     Interim History:  [facility]  Pt shares that she feels that the voices have been getting stronger and finding it harder to talk to other people.  Hears voices like a radio or a conversation is playing and goes on all day long.  Having a hard time ignoring it. Barely gets any sleep so feels that she never gets relief from the voices/noises.She shares that nobody really understands what she is going through and she tries to explain it to people but nobody seems to understand.  Has been dealing with this for 1 year now and she feels that instead of her being a caretaker she is needing help with almost everything.  Cousin is her PCA which she shares is helpful as she trusts her to help with things she wouldn't want a stranger to help with.  Did not talk specifically about medical concerns but she reports that she feels like she isn't improving physically like she hoped she would and did have another fall recently.  Pt shares that she has an appt with Dr. Torres  "([facility]) next month.  Pt would like to continue visits with [facility] and an appt was set for 2 weeks.    Hilaria reports that she has not had any improvement in her auditory hallucinations with increasing olanzapine to 10 mg daily.  She reports that the voices and radio noises are worse. She tries to block them out when she is talking to people, but sometimes they are overpowering.  When she takes hydroxyzine that she reports that they are less intrusive.  They are particularly bad when she is in public, she also worries about people talking about her.  She recently went to the grocery store with her PCA and felt that the noises were \"blasting at her.\"    She had some confusion about her medications.  She thought that I had told her to stop taking venlafaxine, which had been prescribed by her pain medicine provider.  She is still somewhat and can use about the dose and only taking 37.5 mg at night now rather than 75 mg twice daily that is ordered.  A taper will be written out for her in her after visit summary and prescriptions provided so she only has to take 1 tablet a day.    She reports hydroxyzine is helpful for reducing her anxiety and seems to calm some of her hallucinations.  I will give her enough that she can take 25 mg 4 times a day as needed.  She can certainly take it at bedtime for sleep if she needs to.  She reports she has not been sleeping well because of the voices.    We increased her prazosin at her last appointment to 2 mg a day.  She continues to have nightmares, and says she wakes up in the night with a dry mouth.  She is scheduled to have a sleep study soon.  We agreed to not change her present now because of making other changes.    She reports she is using medical cannabis in [place] occasionally.  Cannabis can potentially make hallucinations worse, she denied that was a problem although she reports that she \"does not like feeling high.\"    She has a  now to help her with " her disability application and requested I fill out paperwork.    I encouraged her to talk to her primary care provider about frequent falls, getting some adaptive equipment, and possibly housing resources, as she is currently living in a place with steps and has fallen down them recently.      Vital Signs:   There were no vitals taken for this visit.      Current Medications:  Current Outpatient Medications   Medication     Blood Pressure Monitoring (BLOOD PRESSURE MONITOR/ARM) BRAYAN     diphenhydrAMINE (BENADRYL) 50 MG/ML injection     folic acid (FOLVITE) 1 MG tablet     hydrOXYzine (ATARAX) 25 MG tablet     lisinopril (ZESTRIL) 10 MG tablet     medroxyPROGESTERone (DEPO-PROVERA) 150 MG/ML IM injection     Multiple Vitamins-Minerals (MULTIVITAMIN ADULT) CHEW     NEW MED     OLANZapine (ZYPREXA) 10 MG tablet     omeprazole (PRILOSEC) 20 MG DR capsule     ondansetron (ZOFRAN-ODT) 4 MG ODT tab     polyethylene glycol (MIRALAX) 17 GM/SCOOP powder     potassium chloride ER (KLOR-CON M) 20 MEQ CR tablet     prazosin (MINIPRESS) 1 MG capsule     Pregabalin (LYRICA) 200 MG capsule     PRIVIGEN 20 GM/200ML SOLN     PRIVIGEN 40 GM/400ML SOLN     rivaroxaban ANTICOAGULANT (XARELTO ANTICOAGULANT) 20 MG TABS tablet     tiZANidine (ZANAFLEX) 2 MG tablet     UNABLE TO FIND     UNABLE TO FIND     UNABLE TO FIND     UNABLE TO FIND     venlafaxine (EFFEXOR-XR) 150 MG 24 hr capsule     venlafaxine (EFFEXOR-XR) 75 MG 24 hr capsule     vitamin B-Complex     vitamin C (ASCORBIC ACID) 1000 MG TABS     vitamin D3 (CHOLECALCIFEROL) 50 mcg (2000 units) tablet     COMPOUNDED NON-CONTROLLED SUBSTANCE (CMPD RX) - PHARMACY TO MIX COMPOUNDED MEDICATION     hydrochlorothiazide (HYDRODIURIL) 12.5 MG tablet     traMADol (ULTRAM) 50 MG tablet     Current Facility-Administered Medications   Medication     cyanocobalamin injection 1,000 mcg        The [place] Prescription Monitoring Program has been reviewed and there are no concerns about  diversionary activity for controlled substances at this time.      Mental Status Examination:  Hilaria is a 30-year-old woman in no acute distress.  Speech is clear but slightly pressured.  Mood is anxious.  Thoughts are logical and spontaneous with occasional loose associations.  She reports being confused at times.  She continues to have auditory hallucinations which are present during the day and at night and interfere with her ability to sleep and to take care of her daily activities.  She denies suicidal thoughts.  She appears to be alert and oriented x3.    Assessment and Plan:    ICD-10-CM    1. Psychosis, unspecified psychosis type (H)  F29    2. Moderate major depression (H)  F32.1 venlafaxine (EFFEXOR-XR) 75 MG 24 hr capsule   3. MELODY (generalized anxiety disorder)  F41.1 hydrOXYzine (ATARAX) 25 MG tablet     venlafaxine (EFFEXOR-XR) 75 MG 24 hr capsule     venlafaxine (EFFEXOR-XR) 150 MG 24 hr capsule   4. Cognitive and neurobehavioral dysfunction following brain injury (H)  G31.89     F09     S06.9X9S    5. Chronic inflammatory demyelinating polyneuropathy (H)  G61.81        Medical comorbidities include:   Patient Active Problem List   Diagnosis     Vitamin D deficiency     Elevated parathyroid hormone     Encounter for smoking cessation counseling     Menorrhagia with irregular cycle     Pulmonary embolism with infarction (H)     Cardiac arrest, cause unspecified (H)     VT (ventricular tachycardia) (H)     Secondary hyperparathyroidism, not elsewhere classified (H)     Paresthesias     Hemorrhoids, unspecified hemorrhoid type     Altered mental status     Chronic inflammatory demyelinating polyneuropathy (H)     S/P laparoscopic sleeve gastrectomy     Morbid obesity (H)     Moderate major depression (H)     Alcohol abuse     Cognitive and neurobehavioral dysfunction following brain injury (H)     Psychosis (H)     MELODY (generalized anxiety disorder)     Acute massive pulmonary embolism (H)     Medical  cannabis use     Sleep disturbances     Tobacco use     Compression fracture of T9 vertebra with routine healing, subsequent encounter     HTN (hypertension)       Treatment Plan:  Patient Instructions   Increase venlafaxine ER to 75 mg at bedtime for one week, then increase to 150 mg at bedtime.    Continue olanzapine 10 mg daily.       Continue prazosin 2 mg at bedtime for nightmares.    Change Hydroxyzine to 25 mg 4 times a day as needed for anxiety.  You may take it at bedtime if it helps with sleep.    Please consult with your primary care provider regarding your increased falls, getting some adaptive equipment and housing resources that are handicapped accessible.     Continue all other medications per your primary care provider.    Your psychiatric care is being transferred to Dr. Torres at the [facility] in [place], as he specialized in neuropsychiatry.    It has been a pleasure working with you, best wishes!    [facility] Resources:      Go to the Emergency Department as needed or call after hours crisis line at [phone].      To schedule individual or family therapy, call [facility] at [phone].     Follow up with primary care provider as planned or sooner for acute medical concerns.    Call the psychiatric nurse line with medication questions or concerns at [phone].    Pets are family toohart may be used to communicate with your provider, but this is not intended to be used for emergencies.    Community Resources:      National Suicide Prevention Lifeline: 915.870.7279 (TTY: 910.223.4018). Call anytime for help.  (www.suicidepreventionlifeline.org)    National [place] on Mental Illness (www.joe.org): 439.242.3580 or 678-602-1849.     Mental Health Association (www.mentalhealth.org): 426.112.9833 or 007-110-8170.    [place] Crisis Text [place]: Text [place] to 043707    Suicide LifeLine Chat: suicidepreventionlifeline.org/chat       Administrative Billing:   Phone call  duration: 48 minutes  Total time spent, including chart review and documentation: 60 minutes    Patient Status:  The patient is being referred to long term community psychiatry care and provider will provide bridging until patient is established with new community provider.

## 2021-09-02 NOTE — PROGRESS NOTES
"Collaborative Care Psychiatry Service (CCPS)  September 2, 2021    Behavioral Health Clinician Progress Note    Patient Name: Hilaria Bonner is a 30 year old female who is being evaluated via a telephone visit.      The patient has been notified of the following:     \"We have found that certain health care needs can be provided without the need for a face to face visit.  This service lets us provide the care you need with a short phone conversation.      I will have full access to your [facility] medical record during this entire phone call.   I will be taking notes for your medical record.     Since this is like an office visit, we will bill your insurance company for this service.  Please check with your medical insurance if this type of telephone visit/virtual care is covered.  You may be responsible for the cost of this service if insurance coverage is denied.      There are potential benefits and risks of telephone visits (e.g. limits to patient confidentiality) that differ from in-person visits.?  Confidentiality still applies for telephone services, and nobody will record the visit.  It is important to be in a quiet, private space that is free of distractions (including cell phone or other devices) during the visit.??     If during the course of the call I believe a telephone visit is not appropriate, you will not be charged for this service\"    Consent has been obtained for this service by care team member: yes.           Service Type:  Individual      Service Location:   Phone call (patient / identified key support person reached)     Session Start Time: 10:30a  Session End Time: 10:53a      Session Length: 16 - 37      Attendees: Client    Visit Activities (Refresh list every visit): [facility] Only    Diagnostic Assessment Date: 3/22/21  See Flowsheets for today's PHQ-9 and MELODY-7 results  Previous PHQ-9:   PHQ-9 SCORE 7/22/2021 8/13/2021 9/1/2021   PHQ-9 Total Score MyChart 20 (Severe " depression) 19 (Moderately severe depression) 20 (Severe depression)   PHQ-9 Total Score 20 19 20   PHQ-A Total Score - - -       Previous MELODY-7:   MELODY-7 SCORE 7/14/2021 7/22/2021 9/1/2021   Total Score 11 (moderate anxiety) 11 (moderate anxiety) 15 (severe anxiety)   Total Score 11 11 15       WHODAS  WHODAS 2.0 Total Score 3/22/2021   Total Score 40        AUDIT  No flowsheet data found.    DATA  Extended Session (60+ minutes): No  Interactive Complexity: No  Crisis: No    Medication Compliance:  Yes only took the increased seroquel 2x and then stopped as it didn't help      Chemical Use Review:   Substance Use: Chemical use reviewed, no active concerns identified -pt reports that she has continued to refrain from alcohol use since last session.     Tobacco Use: No change in amount of tobacco use since last session.  Patient declined discussion at this time    Current Stressors / Issues:  Pt shares that she feels that the voices have been getting stronger and finding it harder to talk to other people.  Hears voices like a radio or a conversation is playing and goes on all day long.  Having a hard time ignoring it. Barely gets any sleep so feels that she never gets relief from the voices/noises.  She shares that nobody really understands what she is going through and she tries to explain it to people but nobody seems to understand.  Has been dealing with this for 1 year now and she feels that instead of her being a caretaker she is needing help with almost everything.  Cousin is her PCA which she shares is helpful as she trusts her to help with things she wouldn't want a stranger to help with.  Did not talk specifically about medical concerns but she reports that she feels like she isn't improving physically like she hoped she would and did have another fall recently.  Pt shares that she has an appt with Dr. Torres ([facility]) next month.  Pt would like to continue visits with [facility] and an appt was set  for 2 weeks.      Progress on Treatment Objective(s) / Homework:  Minimal progress - PREPARATION (Decided to change - considering how); Intervened by negotiating a change plan and determining options / strategies for behavior change, identifying triggers, exploring social supports, and working towards setting a date to begin behavior change    Motivational Interviewing    MI Intervention: Expressed Empathy/Understanding, Supported Autonomy, Collaboration, Evocation, Permission to raise concern or advise, Open-ended questions and Change talk (evoked)     Change Talk Expressed by the Patient: Desire to change Reasons to change    Provider Response to Change Talk: E - Evoked more info from patient about behavior change and A - Affirmed patient's thoughts, decisions, or attempts at behavior change    Also provided psychoeducation about behavioral health condition, symptoms, and treatment options      Review of Symptoms per patient report:  Depression: Lack of interest, Change in energy level, Difficulties concentrating, Irritability and Feeling sad, down, or depressed  Candice:  No Symptoms  Psychosis: Auditory hallucinations and reports vivid nightmares.  Hears noices/voices like a radio station being on all the time.  Anxiety: Excessive worry and Social anxiety  Panic:  Palpitations and Shortness of breath  Post Traumatic Stress Disorder:  Nightmares   Eating Disorder: No Symptoms  ADD / ADHD:  No symptoms  Conduct Disorder: No symptoms  Autism Spectrum Disorder: No symptoms  Obsessive Compulsive Disorder: No Symptoms      Changes in Health Issues:   None reported-Continues with health issues and concerns following a heart attack 2 years ago and since having COVID last year.    Assessment: Current Emotional / Mental Status (status of significant symptoms):  Risk status (Self / Other harm or suicidal ideation)  Patient denies a history of suicidal ideation, suicide attempts, self-injurious behavior, homicidal ideation,  homicidal behavior and and other safety concerns  Patient denies current fears or concerns for personal safety.  Patient denies current or recent suicidal ideation or behaviors.  Patient denies current or recent homicidal ideation or behaviors.  Patient denies current or recent self injurious behavior or ideation.  Patient denies other safety concerns.  A safety and risk management plan has not been developed at this time, however patient was encouraged to call [place] [phone] should there be a change in any of these risk factors.    Appearance:   phone visit   Eye Contact:   phone visit   Psychomotor Behavior: phone visit   Attitude:   Cooperative   Orientation:   All  Speech   Rate / Production: Normal    Volume:  Normal   Mood:    Normal  Affect:    Appropriate   Thought Content:  Clear   Thought Form:  Coherent  Logical   Insight:    Good     Diagnoses:  1. Moderate major depression (H)    2. Generalized anxiety disorder        Collateral Reports Completed:  Not Applicable    Plan: (Homework, other):  Patient was given information about behavioral services and encouraged to schedule a follow up appointment with the clinic [facility].  She was also given information about mental health symptoms and treatment options .  Pt will follow up with [facility] in 2 weeks. CD Recommendations: pt declines any CD resources at this time.  Has been sober for past several months and doesn't feel formal programming is needed at this time. .  Dana Harrison [facility], [facility]      ANAYELI Aj, [facility]       September 2, 2021

## 2021-09-03 ENCOUNTER — ALLIED HEALTH/NURSE VISIT (OUTPATIENT)
Dept: FAMILY MEDICINE | Facility: CLINIC | Age: 31
End: 2021-09-03
Payer: COMMERCIAL

## 2021-09-03 DIAGNOSIS — Z30.9 ENCOUNTER FOR CONTRACEPTIVE MANAGEMENT, UNSPECIFIED TYPE: Primary | ICD-10-CM

## 2021-09-03 DIAGNOSIS — E53.8 VITAMIN B12 DEFICIENCY (NON ANEMIC): ICD-10-CM

## 2021-09-03 PROCEDURE — 99207 PR NO CHARGE NURSE ONLY: CPT

## 2021-09-03 PROCEDURE — 96372 THER/PROPH/DIAG INJ SC/IM: CPT | Performed by: PHYSICIAN ASSISTANT

## 2021-09-03 PROCEDURE — 96372 THER/PROPH/DIAG INJ SC/IM: CPT | Performed by: FAMILY MEDICINE

## 2021-09-03 RX ORDER — MEDROXYPROGESTERONE ACETATE 150 MG/ML
150 INJECTION, SUSPENSION INTRAMUSCULAR
Status: COMPLETED | OUTPATIENT
Start: 2021-09-03 | End: 2021-09-03

## 2021-09-03 RX ADMIN — CYANOCOBALAMIN 1000 MCG: 1000 INJECTION, SOLUTION INTRAMUSCULAR; SUBCUTANEOUS at 14:27

## 2021-09-03 RX ADMIN — MEDROXYPROGESTERONE ACETATE 150 MG: 150 INJECTION, SUSPENSION INTRAMUSCULAR at 14:35

## 2021-09-03 NOTE — PROGRESS NOTES
Clinic Administered Medication Documentation          Depo Provera Documentation    URINE HCG: not indicated    Depo-Provera Standing Order inclusion/exclusion criteria reviewed.   Patient meets: inclusion criteria     BP: Data Unavailable  LAST PAP/EXAM:   Lab Results   Component Value Date    PAP NIL 09/27/2019       Prior to injection, verified patient identity using patient's name and date of birth. Medication was administered. Please see MAR and medication order for additional information.     Was entire vial of medication used? Yes  Vial/Syringe: Single dose vial  Expiration Date:  09/01/22    Patient instructed to remain in clinic for 15 minutes and report any adverse reaction to staff immediately .  NEXT INJECTION DUE: 11/19/21 - 12/3/21      Clinic Administered Medication Documentation    Administrations This Visit     cyanocobalamin injection 1,000 mcg     Admin Date  09/03/2021 Action  Given Dose  1,000 mcg Route  Intramuscular Site  Right Deltoid Administered By  Connie Dickens    Ordering Provider: Crissy Madrigal PA-C    Patient Supplied?: No    Comments: given in clinic                  Injectable Medication Documentation    Patient was given Cyanocobalamin (B-12). Prior to medication administration, verified patients identity using patient s name and date of birth. Please see MAR and medication order for additional information. Patient instructed to remain in clinic for 15 minutes and report any adverse reaction to staff immediately .      Was entire vial of medication used? Yes  Vial/Syringe: Single dose vial  Expiration Date:  03/01/22  Was this medication supplied by the patient? No

## 2021-09-07 DIAGNOSIS — E87.6 HYPOKALEMIA: ICD-10-CM

## 2021-09-07 RX ORDER — POTASSIUM CHLORIDE 1500 MG/1
20 TABLET, EXTENDED RELEASE ORAL 2 TIMES DAILY
Qty: 180 TABLET | Refills: 0 | Status: SHIPPED | OUTPATIENT
Start: 2021-09-07 | End: 2021-12-16

## 2021-09-07 NOTE — TELEPHONE ENCOUNTER
Prescription approved per [facility] Refill Protocol.  Erin Lackey RN, BSN   [facility]

## 2021-09-08 ENCOUNTER — TELEPHONE (OUTPATIENT)
Dept: PSYCHIATRY | Facility: CLINIC | Age: 31
End: 2021-09-08

## 2021-09-08 NOTE — PROGRESS NOTES
This is a recent snapshot of the patient's [facility] medical record.  For current drug dose and complete information and questions, call [phone] or In Basket pool, [facility] ([id number])  CSN Number:  [id number]

## 2021-09-08 NOTE — TELEPHONE ENCOUNTER
Reason for Call:  Form, our goal is to have forms completed with 72 hours, however, some forms may require a visit or additional information.    Type of letter, form or note:  SS Disability forms from [employer]    Who is the form from?:  [employer] (if other please explain)    Where did the form come from: form was faxed in    What clinic location was the form placed at?: [facility]    Where the form was placed: Given to physician     What number is listed as a contact on the form?: Law Office Telephone # [phone], Fax # [phone]       Additional comments: E-mailed RYLIE, SS Disability forms from [employer] to Dr. Matute to be completed and signed.  Faxed The RYLIE to our RYLIE dept at [phone] attn: So.  Faxed copy of the RYLIE to the Abstracting dept at fax # [phone] for scanning into the patients chart.    9/15/21 received forms back from Dr. Mattue completed and signed. Faxed SS Disability forms to [employer]  And faxed copies of the forms to the Abstracting dept to have scanned into the patients chart. Copies of the forms also mailed out to patient for her records.    Call taken on 9/8/2021 at 4:09 PM by Nita Evans CMA

## 2021-09-09 DIAGNOSIS — R63.5 ABNORMAL WEIGHT GAIN: ICD-10-CM

## 2021-09-09 DIAGNOSIS — R60.0 BILATERAL LEG EDEMA: ICD-10-CM

## 2021-09-10 ENCOUNTER — THERAPY VISIT (OUTPATIENT)
Dept: PHYSICAL THERAPY | Facility: CLINIC | Age: 31
End: 2021-09-10
Payer: COMMERCIAL

## 2021-09-10 DIAGNOSIS — M54.6 BILATERAL THORACIC BACK PAIN, UNSPECIFIED CHRONICITY: Primary | ICD-10-CM

## 2021-09-10 PROCEDURE — 97110 THERAPEUTIC EXERCISES: CPT | Mod: GP | Performed by: PHYSICAL THERAPIST

## 2021-09-10 PROCEDURE — 97112 NEUROMUSCULAR REEDUCATION: CPT | Mod: GP | Performed by: PHYSICAL THERAPIST

## 2021-09-11 RX ORDER — HYDROCHLOROTHIAZIDE 12.5 MG/1
TABLET ORAL
Qty: 30 TABLET | Refills: 1 | Status: SHIPPED | OUTPATIENT
Start: 2021-09-11 | End: 2021-12-20

## 2021-09-13 ENCOUNTER — HOME INFUSION (PRE-WILLOW HOME INFUSION) (OUTPATIENT)
Dept: PHARMACY | Facility: CLINIC | Age: 31
End: 2021-09-13

## 2021-09-13 ENCOUNTER — VIRTUAL VISIT (OUTPATIENT)
Dept: ENDOCRINOLOGY | Facility: CLINIC | Age: 31
End: 2021-09-13
Payer: COMMERCIAL

## 2021-09-13 DIAGNOSIS — Z98.84 S/P LAPAROSCOPIC SLEEVE GASTRECTOMY: ICD-10-CM

## 2021-09-13 DIAGNOSIS — Z71.3 NUTRITIONAL COUNSELING: Primary | ICD-10-CM

## 2021-09-13 PROCEDURE — 97803 MED NUTRITION INDIV SUBSEQ: CPT | Mod: 95 | Performed by: DIETITIAN, REGISTERED

## 2021-09-13 NOTE — PROGRESS NOTES
"Hilaria Bonner is a 30 year old female who is being evaluated via a billable telephone visit.      The patient has been notified of following:     \"This telephone visit will be conducted via a call between you and your physician/provider. We have found that certain health care needs can be provided without the need for a physical exam.  This service lets us provide the care you need with a short phone conversation.  If a prescription is necessary we can send it directly to your pharmacy.  If lab work is needed we can place an order for that and you can then stop by our lab to have the test done at a later time.    Telephone visits are billed at different rates depending on your insurance coverage. During this emergency period, for some insurers they may be billed the same as an in-person visit.  Please reach out to your insurance provider with any questions.    If during the course of the call the physician/provider feels a telephone visit is not appropriate, you will not be charged for this service.\"    Patient has given verbal consent for Telephone visit?  Yes    What phone number would you like to be contacted at? [phone]    How would you like to obtain your AVS? MyChart    Phone call duration: 22    During this virtual visit the patient is located in [place], patient verifies this as the location during the entirety of this visit.       Reason For Visit:  Hilaria Bonner is a 30 year old female, completed a virtual visit today for nutrition follow-up s/p SG with Dr Lopez (3/26/19).  Patient referred by Joanne TVOAR.      Anthropometrics  Initial Consult Weight: 315.4 lbs  Day of Surgery Weight(3/26/19): 291.2 lbs    Current Weight:   Estimated body mass index is 45 kg/m  as calculated from the following:    Height as of 8/13/21: 1.695 m (5' 6.75\").    Weight as of 8/13/21: 129.4 kg (285 lb 3.2 oz).      Current weight: 290 lbs last time checked per pt, scale broke. Has not been able to check recently. "     Current Vitamins/Minerals:  MVI with iron 2x/day   500 mg Calcium Citrate (TID) - not taking. Has been taking Vit C instead. Got them mixed up per pt. However, given pt's hx it may be possible she is taking this to help with iron absorption given blood status.   Vitamin D3 (2000 international unit(s)  B-50 complex     Nutrition History:     August 11 2021  Pt has not seen RD since 12/16/2020    No appetite for ~ 2 weeks, feeling full off of very little amounts    Recall:  Shrimp fried rice   Fluids: water (5-6 bottles)  Does not recall if she ate anything else    Has been trying to cut back on fluids, reports last RD told her she was drinking way too much . Was drinking around  oz. Reports she tries to stay hydrated for treatment and might have to get fluids in the ER if not staying hydrated. Encouraged pt to work on hydration as well as intake     Does not like the taste of protein shake    Wanted to review foods she should/should not be eating. Wants to know what starchy vegetables are.    Reviewed dietary recommendations/guidelines   - Pt recalls protein minimum   - Pt recalls  fluids from 30 min before/after but has not been doing  - Sugar/fat - pt did not remember, informed her of recommendations     Mailed following handouts:   Regular diet, protein sources, meal ideas, site with bariatric plates/utensils, dietary guidelines after bariatric surgery    Sept 13th   No major changes since last appt per pt.   Still struggling with appetite. Will notice an increase in appetite after treatment (infusions)    Some days (1-2x/week) will get super hungry and then eat fast. Taking another bite before first bite is even gone.    Recent recall  Protein shake in am  Chicken salad for lunch (forced self to eat) - got about 5-6 bites. Using baby spoon  Protein shake for dinner   Fluids: doing 64 oz/day,  from lunch time meal      Previous goals:   1) Continue bariatric regular diet.   2) Aim for  3 meals per day   3)Consume 60 grams of protein/day.    - Eat protein foods first.   4) Sip on 64-96 oz of fluids/day- between meals only (do not drink 30 mins before or after meal or while you're eating)  4) Eat slowly (>20 min/meal), chewing foods well (to applesauce-like consistency).   - Use a baby/toddler fork/spoon and small plate/bowls (should hold 1 cup or less)  5) Limit portions to 3/4 to 1 cup/meal.   6) Take the following supplements:    Multivitamin/minerals: adult dose 2 times daily    Iron: 45-60 mg elemental (18-36 mg if low risk) - may partly or fully be covered in multivitamin     Calcium Citrate containing vitamin D: 500 mg 3 times daily or 600 mg 2 times daily    Vitamin B12: sublingual form of at least 500 mcg daily or injection of 1000 mcg monthly     B-50 Complex once daily     Physical Activity:  Did not discuss today     Nutrition Prescription:  Grams Protein: 50-60 (minimum)  Amount of Fluid: 48-64 oz    Nutrition Diagnosis   Food and nutrition-related knowledge deficit r/t limited prior exposure to maintenance diet guidelines after bariatric surgery aeb pt unable to verbalize full understanding of maintenance diet guidelines post bariatric surgery.      Intervention  Mailed following handouts:   Regular diet, protein sources, meal ideas, site with bariatric plats/utensils, dietary guidelines after bariatric surgery  Assessed current dietary habits  Nutrition Education on Dietary recommendations   Answered pt questions     Goals:  1) Continue bariatric regular diet.   2) Aim for 3 meals per day   3)Consume 60 grams of protein/day.    - Eat protein foods first.   4) Sip on 64-96 oz of fluids/day- between meals only (do not drink 30 mins before or after meal or while you're eating)  4) Eat slowly (>20 min/meal), chewing foods well (to applesauce-like consistency).  5) Take the following supplements:    Multivitamin/minerals: adult dose 2 times daily    Iron: 45-60 mg elemental (18-36 mg if  low risk) - may partly or fully be covered in multivitamin     Calcium Citrate containing vitamin D: 500 mg 3 times daily or 600 mg 2 times daily    Vitamin B12: sublingual form of at least 500 mcg daily or injection of 1000 mcg monthly     B-50 Complex once daily    Diet Guidelines after Weight-loss Surgery  [email or web address]     Regular Foods/Meal Ideas  [email or web address]     Protein Sources   [email or web address]     Link to Bariatric Store with Plates/Utensils  [email or web address]      Follow-Up:  1 month, PRN     Time spent with pt: 22 mins  SIL Kate, RD, LD

## 2021-09-13 NOTE — Clinical Note
Pt reported she got vit c and calcium mixed up and has been taking vitamin C but not ca. She wants me to prescribe Ca but are there any contraindications given her medical status? I want to make sure the ca won't interfere with the infusions/absorption. Thanks!

## 2021-09-13 NOTE — LETTER
"9/13/2021       RE: Hilaria Bonner  [address]  [address]  [facility] [address]     Dear Colleague,    Thank you for referring your patient, Hilaria Bonner, to the [facility] [place] at [facility]. Please see a copy of my visit note below.    Hilaria Bonner is a 30 year old female who is being evaluated via a billable telephone visit.      The patient has been notified of following:     \"This telephone visit will be conducted via a call between you and your physician/provider. We have found that certain health care needs can be provided without the need for a physical exam.  This service lets us provide the care you need with a short phone conversation.  If a prescription is necessary we can send it directly to your pharmacy.  If lab work is needed we can place an order for that and you can then stop by our lab to have the test done at a later time.    Telephone visits are billed at different rates depending on your insurance coverage. During this emergency period, for some insurers they may be billed the same as an in-person visit.  Please reach out to your insurance provider with any questions.    If during the course of the call the physician/provider feels a telephone visit is not appropriate, you will not be charged for this service.\"    Patient has given verbal consent for Telephone visit?  Yes    What phone number would you like to be contacted at? [phone]    How would you like to obtain your AVS? MyChart    Phone call duration: 22    During this virtual visit the patient is located in [place], patient verifies this as the location during the entirety of this visit.       Reason For Visit:  Hilaria Bonner is a 30 year old female, completed a virtual visit today for nutrition follow-up s/p SG with Dr Lopez (3/26/19).  Patient referred by Joanne TOVAR.      Anthropometrics  Initial Consult Weight: " "315.4 lbs  Day of Surgery Weight(3/26/19): 291.2 lbs    Current Weight:   Estimated body mass index is 45 kg/m  as calculated from the following:    Height as of 8/13/21: 1.695 m (5' 6.75\").    Weight as of 8/13/21: 129.4 kg (285 lb 3.2 oz).      Current weight: 290 lbs last time checked per pt, scale broke. Has not been able to check recently.     Current Vitamins/Minerals:  MVI with iron 2x/day   500 mg Calcium Citrate (TID) - not taking. Has been taking Vit C instead. Got them mixed up per pt. However, given pt's hx it may be possible she is taking this to help with iron absorption given blood status.   Vitamin D3 (2000 international unit(s)  B-50 complex     Nutrition History:     August 11 2021  Pt has not seen RD since 12/16/2020    No appetite for ~ 2 weeks, feeling full off of very little amounts    Recall:  Shrimp fried rice   Fluids: water (5-6 bottles)  Does not recall if she ate anything else    Has been trying to cut back on fluids, reports last RD told her she was drinking way too much . Was drinking around  oz. Reports she tries to stay hydrated for treatment and might have to get fluids in the ER if not staying hydrated. Encouraged pt to work on hydration as well as intake     Does not like the taste of protein shake    Wanted to review foods she should/should not be eating. Wants to know what starchy vegetables are.    Reviewed dietary recommendations/guidelines   - Pt recalls protein minimum   - Pt recalls  fluids from 30 min before/after but has not been doing  - Sugar/fat - pt did not remember, informed her of recommendations     Mailed following handouts:   Regular diet, protein sources, meal ideas, site with bariatric plates/utensils, dietary guidelines after bariatric surgery    Sept 13th   No major changes since last appt per pt.   Still struggling with appetite. Will notice an increase in appetite after treatment (infusions)    Some days (1-2x/week) will get super hungry and " then eat fast. Taking another bite before first bite is even gone.    Recent recall  Protein shake in am  Chicken salad for lunch (forced self to eat) - got about 5-6 bites. Using baby spoon  Protein shake for dinner   Fluids: doing 64 oz/day,  from lunch time meal      Previous goals:   1) Continue bariatric regular diet.   2) Aim for 3 meals per day   3)Consume 60 grams of protein/day.    - Eat protein foods first.   4) Sip on 64-96 oz of fluids/day- between meals only (do not drink 30 mins before or after meal or while you're eating)  4) Eat slowly (>20 min/meal), chewing foods well (to applesauce-like consistency).   - Use a baby/toddler fork/spoon and small plate/bowls (should hold 1 cup or less)  5) Limit portions to 3/4 to 1 cup/meal.   6) Take the following supplements:    Multivitamin/minerals: adult dose 2 times daily    Iron: 45-60 mg elemental (18-36 mg if low risk) - may partly or fully be covered in multivitamin     Calcium Citrate containing vitamin D: 500 mg 3 times daily or 600 mg 2 times daily    Vitamin B12: sublingual form of at least 500 mcg daily or injection of 1000 mcg monthly     B-50 Complex once daily     Physical Activity:  Did not discuss today     Nutrition Prescription:  Grams Protein: 50-60 (minimum)  Amount of Fluid: 48-64 oz    Nutrition Diagnosis   Food and nutrition-related knowledge deficit r/t limited prior exposure to maintenance diet guidelines after bariatric surgery aeb pt unable to verbalize full understanding of maintenance diet guidelines post bariatric surgery.      Intervention  Mailed following handouts:   Regular diet, protein sources, meal ideas, site with bariatric plats/utensils, dietary guidelines after bariatric surgery  Assessed current dietary habits  Nutrition Education on Dietary recommendations   Answered pt questions     Goals:  1) Continue bariatric regular diet.   2) Aim for 3 meals per day   3)Consume 60 grams of protein/day.    - Eat protein  foods first.   4) Sip on 64-96 oz of fluids/day- between meals only (do not drink 30 mins before or after meal or while you're eating)  4) Eat slowly (>20 min/meal), chewing foods well (to applesauce-like consistency).  5) Take the following supplements:    Multivitamin/minerals: adult dose 2 times daily    Iron: 45-60 mg elemental (18-36 mg if low risk) - may partly or fully be covered in multivitamin     Calcium Citrate containing vitamin D: 500 mg 3 times daily or 600 mg 2 times daily    Vitamin B12: sublingual form of at least 500 mcg daily or injection of 1000 mcg monthly     B-50 Complex once daily    Diet Guidelines after Weight-loss Surgery  [email or web address]     Regular Foods/Meal Ideas  [email or web address]     Protein Sources   [email or web address]     Link to Bariatric [employer] with Plates/Utensils  [email or web address]      Follow-Up:  1 month, PRN     Time spent with pt: 22 mins  SIL Kate, RD, LD

## 2021-09-14 ENCOUNTER — TELEPHONE (OUTPATIENT)
Dept: FAMILY MEDICINE | Facility: CLINIC | Age: 31
End: 2021-09-14

## 2021-09-14 NOTE — TELEPHONE ENCOUNTER
Reason for Call:  Other call back    Detailed comments:Patient is wondering is an order can be put in for her depo shot, patient stated she came in and got a 1 month dose, just following up to see if an appt is needed for new order or what she should do next.  Phone Number Patient can be reached at: Home number on file [phone] (home)    Best Time: anytime    Can we leave a detailed message on this number? YES    Call taken on 9/14/2021 at 10:15 AM by Bob Longoria

## 2021-09-14 NOTE — PROGRESS NOTES
This is a recent snapshot of the patient's [facility] medical record.  For current drug dose and complete information and questions, call [phone] or In Basket pool, fv home infusion ([id number])  CSN Number:  [id number]

## 2021-09-14 NOTE — TELEPHONE ENCOUNTER
Looks like she just had depo shot normaner this month (??). Should be getting every 3 months (there is no 1 month depo shot that I am aware of) so not due again till Dec.   From chart review, looks like gyn had ordered the depo shots for her last Sept.   She is due for annual gyn exam for future depo orders which she can schedule with GUY Diamond or with gyn again.

## 2021-09-15 ENCOUNTER — TELEPHONE (OUTPATIENT)
Dept: FAMILY MEDICINE | Facility: CLINIC | Age: 31
End: 2021-09-15

## 2021-09-15 NOTE — TELEPHONE ENCOUNTER
Patient needs to schedule AFE when next Depo is due with either FP or OB/GYN.    Ashu Nicholson, CMA

## 2021-09-15 NOTE — TELEPHONE ENCOUNTER
This writer attempted to contact [facility] on 09/15/21      Reason for call patient education on the depo shot and left message to give a return phone call to the nurses at .       If patient calls back:   Patient education needed regarding depo, and for clarification of patients request    Carly Waller RN    9/3/2021 patient received the depo from [facility].    Prior to injection, verified patient identity using patient's name and date of birth. Medication was administered. Please see MAR and medication order for additional information.      Was entire vial of medication used? Yes  Vial/Syringe: Single dose vial  Expiration Date:  09/01/22     Patient instructed to remain in clinic for 15 minutes and report any adverse reaction to staff immediately .  NEXT INJECTION DUE: 11/19/21 - 12/3/21

## 2021-09-15 NOTE — TELEPHONE ENCOUNTER
Patient/parent is informed of MD note below, as it is written.  Reviewed Depo Provera information in detail.  Verbalized good understanding.\  Needing new orders.   Last ordered by GYN, routing to care team.   Wilda Beckwith RN

## 2021-09-15 NOTE — TELEPHONE ENCOUNTER
Patient is requesting a handicap pass.  Patient reports her Neuropathy has worsened and is needing to use her walker consistently.  Requesting pass.    If unable to reach her via phone can contact her via [employer].   Please advise  Wilda Beckwith RN

## 2021-09-16 ENCOUNTER — DOCUMENTATION ONLY (OUTPATIENT)
Dept: PHARMACY | Facility: CLINIC | Age: 31
End: 2021-09-16

## 2021-09-16 ENCOUNTER — TELEPHONE (OUTPATIENT)
Dept: ENDOCRINOLOGY | Facility: CLINIC | Age: 31
End: 2021-09-16
Payer: COMMERCIAL

## 2021-09-16 ENCOUNTER — HOME INFUSION (PRE-WILLOW HOME INFUSION) (OUTPATIENT)
Dept: PHARMACY | Facility: CLINIC | Age: 31
End: 2021-09-16

## 2021-09-16 NOTE — TELEPHONE ENCOUNTER
Left message for patient to call back. Please see message below. Also sent MyChart message.  Maddy Farrell CMA

## 2021-09-16 NOTE — PROGRESS NOTES
Of note this writer assumed  for this pt during the 2nd half of infusion.  Pre-infusion checklist and evaluation completed. Skilled Nurse visit in the  patient home to administer Privigen 60 gm/ 600 ml IV via CADD pump q 21 days. No recent elevated temperature, fever, chills, productive cough, coughing for 3 weeks or longer or hemoptysis, abnormal vital signs, night sweats, chest pain. No decrease in your appetite, unexplained weight loss or fatigue.  No other new onset medical symptoms. Current weight 275 lbs. PIV placed x 1 attempt. Pre medicated with Po Benadryl 50 mg,  Tylenol 650 mg, and  IV Solu-cortef 100 mg. No labs drawn. Infusion completed without complication or reaction. Pt reports therapy is somewhat helpful in managing symptoms.    Eric Stafford RN BSN  [facility]  [email or web address]  [phone]

## 2021-09-17 NOTE — PROGRESS NOTES
This is a recent snapshot of the patient's [facility] medical record.  For current drug dose and complete information and questions, call [phone] or In Basket pool, fv home infusion ([id number])  CSN Number:  [id number]

## 2021-09-20 ENCOUNTER — VIRTUAL VISIT (OUTPATIENT)
Dept: BEHAVIORAL HEALTH | Facility: CLINIC | Age: 31
End: 2021-09-20
Payer: COMMERCIAL

## 2021-09-20 DIAGNOSIS — F41.1 GENERALIZED ANXIETY DISORDER: ICD-10-CM

## 2021-09-20 DIAGNOSIS — F29 PSYCHOSIS, UNSPECIFIED PSYCHOSIS TYPE (H): ICD-10-CM

## 2021-09-20 DIAGNOSIS — F32.1 MODERATE MAJOR DEPRESSION (H): Primary | ICD-10-CM

## 2021-09-20 PROCEDURE — 90834 PSYTX W PT 45 MINUTES: CPT | Mod: 95 | Performed by: SOCIAL WORKER

## 2021-09-20 RX ORDER — OLANZAPINE 10 MG/1
10 TABLET ORAL AT BEDTIME
Qty: 30 TABLET | Refills: 0 | Status: SHIPPED | OUTPATIENT
Start: 2021-09-20 | End: 2021-11-12

## 2021-09-20 NOTE — PROGRESS NOTES
"Collaborative Care Psychiatry Service (CCPS)  September 20, 2021    Behavioral Health Clinician Progress Note    Patient Name: Hilaria Bonner is a 30 year old female who is being evaluated via a telephone visit.      The patient has been notified of the following:     \"We have found that certain health care needs can be provided without the need for a face to face visit.  This service lets us provide the care you need with a short phone conversation.      I will have full access to your [facility] medical record during this entire phone call.   I will be taking notes for your medical record.     Since this is like an office visit, we will bill your insurance company for this service.  Please check with your medical insurance if this type of telephone visit/virtual care is covered.  You may be responsible for the cost of this service if insurance coverage is denied.      There are potential benefits and risks of telephone visits (e.g. limits to patient confidentiality) that differ from in-person visits.?  Confidentiality still applies for telephone services, and nobody will record the visit.  It is important to be in a quiet, private space that is free of distractions (including cell phone or other devices) during the visit.??     If during the course of the call I believe a telephone visit is not appropriate, you will not be charged for this service\"    Consent has been obtained for this service by care team member: yes.           Service Type:  Individual      Service Location:   Phone call (patient / identified key support person reached)     Session Start Time: 11:20a  Session End Time: 12:10p      Session Length: 38 - 52      Attendees: Client    Visit Activities (Refresh list every visit): [facility] Only    Diagnostic Assessment Date: 3/22/21  See Flowsheets for today's PHQ-9 and MELODY-7 results  Previous PHQ-9:   PHQ-9 SCORE 7/22/2021 8/13/2021 9/1/2021   PHQ-9 Total Score MyChart 20 (Severe " "depression) 19 (Moderately severe depression) 20 (Severe depression)   PHQ-9 Total Score 20 19 20   PHQ-A Total Score - - -       Previous MELODY-7:   MELODY-7 SCORE 7/14/2021 7/22/2021 9/1/2021   Total Score 11 (moderate anxiety) 11 (moderate anxiety) 15 (severe anxiety)   Total Score 11 11 15       WHODAS  WHODAS 2.0 Total Score 3/22/2021   Total Score 40        AUDIT  No flowsheet data found.    DATA  Extended Session (60+ minutes): No  Interactive Complexity: No  Crisis: No    Medication Compliance:  Yes only took the increased seroquel 2x and then stopped as it didn't help      Chemical Use Review:   Substance Use: Chemical use reviewed, no active concerns identified -pt reports that she has continued to refrain from alcohol use since last session.     Tobacco Use: No change in amount of tobacco use since last session.  Patient declined discussion at this time    Current Stressors / Issues:  Pt shares that she is having a tough time and continues to hear the voices in her head.  Despite having conversations or trying to distract self the noise is always there. Does at times have visions/hallucinations most days but not all day like the auditory hallucinations.   Shares that this has been going on since she had her heart attack last year.  Visions are most bothersome when trying to sleep so tries to put cover over her head or turn away.  Feels her sleep has improved a little bit.    Is using the hydroxyzine as needed primarily when has an appt coming up.    Pt shares that this morning she was trying to fold some clothes that her PCA washed and she was trying to fold clothes but now her hands are \"killing her\".  Pt shares that textures are really hard on her hands.  Pt was dx'd with neuropathy after having COVID 1 year ago.  Continues to fall as she shares that her legs are giving out often and she never knows when she might fall.  Also worries about her memory and has to leave herself notes or she will likely forget.  "   Lost her unemployment benefits and now just gets child credit.  Pt shares that she has a SSDI hearing on 10/6/21.    Pt shares that all her friends are in [place].  Has a close relationship with her aunt and cousin and recently told them about her hallucinations.  She shares that she has a hard time opening up about those things to others.   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    Continues with IV treatments every 3 weeks and pt is unsure how long she will be getting the IV treatments but there is no stop date set.   Pt shares that her 12 year old is helpful but does get frustrated at times when he is asked for help. Pt reports feeling helpless and wishing she could do the things she used to be able to do.    Pt is using medical marijuana (gummies) which she reports helps her relax but doesn't help with pain like she hoped it would.  Shares that she hasn't drank in many months although she has urges at times.    Pt and [facility] did talk about trying some calming exercises and pt plans to look into the iVengo Timer shruti as well.  Pt has an appt with Long Term psychiatry next month (2 weeks away).  [facility] and pt will meet again in 2.5 weeks.     Progress on Treatment Objective(s) / Homework:  Minimal progress - PREPARATION (Decided to change - considering how); Intervened by negotiating a change plan and determining options / strategies for behavior change, identifying triggers, exploring social supports, and working towards setting a date to begin behavior change    Motivational Interviewing    MI Intervention: Expressed Empathy/Understanding, Supported Autonomy, Collaboration, Evocation, Permission to raise concern or advise, Open-ended questions and Change talk (evoked)     Change Talk Expressed by the Patient: Desire to change Reasons to change    Provider Response to Change Talk: E - Evoked more info from patient about behavior change and A -  Affirmed patient's thoughts, decisions, or attempts at behavior change    Also provided psychoeducation about behavioral health condition, symptoms, and treatment options      Review of Symptoms per patient report:  Depression: Lack of interest, Change in energy level, Difficulties concentrating, Feelings of hopelessness, Irritability and Feeling sad, down, or depressed  Candice:  No Symptoms  Psychosis: Auditory hallucinations and reports vivid nightmares.  Hears noices/voices like a radio station being on all the time.  Anxiety: Excessive worry and Social anxiety  Panic:  Palpitations and Shortness of breath  Post Traumatic Stress Disorder:  Nightmares   Eating Disorder: No Symptoms  ADD / ADHD:  No symptoms  Conduct Disorder: No symptoms  Autism Spectrum Disorder: No symptoms  Obsessive Compulsive Disorder: No Symptoms      Changes in Health Issues:   None reported-Continues with health issues and concerns following a heart attack 2 years ago and since having COVID last year.    Assessment: Current Emotional / Mental Status (status of significant symptoms):  Risk status (Self / Other harm or suicidal ideation)  Patient denies a history of suicidal ideation, suicide attempts, self-injurious behavior, homicidal ideation, homicidal behavior and and other safety concerns  Patient denies current fears or concerns for personal safety.  Patient denies current or recent suicidal ideation or behaviors.  Patient denies current or recent homicidal ideation or behaviors.  Patient denies current or recent self injurious behavior or ideation.  Patient denies other safety concerns.  A safety and risk management plan has not been developed at this time, however patient was encouraged to call [facility] should there be a change in any of these risk factors.    Appearance:   phone visit   Eye Contact:   phone visit   Psychomotor Behavior: phone visit   Attitude:   Cooperative   Orientation:   All  Speech   Rate /  Production: Normal    Volume:  Normal   Mood:    Normal  Affect:    Appropriate   Thought Content:  Clear   Thought Form:  Coherent  Logical   Insight:    Good     Diagnoses:  1. Moderate major depression (H)    2. Generalized anxiety disorder        Collateral Reports Completed:  Not Applicable    Plan: (Homework, other):  Patient was given information about behavioral services and encouraged to schedule a follow up appointment with the clinic [facility] in conjunction with next CCPS appointment.  She was also given information about mental health symptoms and treatment options .  Pt will follow up with [facility] in 2 weeks. CD Recommendations: pt declines any CD resources at this time.  Has been sober for past several months and doesn't feel formal programming is needed at this time. .  [facility]      [facility]       September 20, 2021

## 2021-09-20 NOTE — TELEPHONE ENCOUNTER
Date of Last Office Visit: 09/02/2021 w/ Dr. Matute  Date of Next Office Visit: 10/06/2021 w/ Dr. Torres  No shows since last visit: 0  Cancellations since last visit: 0    Medication requested: Olanzapine 10mg Date last ordered: 07/14/2021 Qty: 30 Refills: 1     Lapse in medication adherence greater than 5 days?: no  If yes, call patient and gather details: n/a  Medication refill request verified as identical to current order?: no refills dt provider transfer  Result of Last DAM, VPA, Li+ Level, CBC, or Carbamazepine Level (at or since last visit): N/A    Last visit treatment plan:   Patient Instructions   Increase venlafaxine ER to 75 mg at bedtime for one week, then increase to 150 mg at bedtime.     Continue olanzapine 10 mg daily.       Continue prazosin 2 mg at bedtime for nightmares.     Change Hydroxyzine to 25 mg 4 times a day as needed for anxiety.  You may take it at bedtime if it helps with sleep.     Please consult with your primary care provider regarding your increased falls, getting some adaptive equipment and housing resources that are handicapped accessible.     Continue all other medications per your primary care provider.     Your psychiatric care is being transferred to Dr. Torres at the [facility] psychiatric clinic in [place], as he specialized in neuropsychiatry.      []Medication refilled per  Medication Refill in Ambulatory Care  policy.  [x]Medication unable to be refilled by RN due to criteria not met as indicated below:    []Eligibility - not seen in the last year   []Supervision - no future appointment   []Compliance - no shows, cancellations or lapse in therapy   []Verification - order discrepancy   []Controlled medication   [x]Medication not included in policy   []90-day supply request   []Other    [facility]/ new long-term provider.  ------  Refill request r'cd from [facility] via fax for Venlafaxine 75mg denied due to dc'ed  "after 7 days course. Faxed \"not authorized\" back to pharmacy.    "

## 2021-09-29 ENCOUNTER — ANCILLARY PROCEDURE (OUTPATIENT)
Dept: MRI IMAGING | Facility: CLINIC | Age: 31
End: 2021-09-29
Attending: PHYSICIAN ASSISTANT
Payer: COMMERCIAL

## 2021-09-29 ENCOUNTER — TELEPHONE (OUTPATIENT)
Dept: NEUROLOGY | Facility: CLINIC | Age: 31
End: 2021-09-29

## 2021-09-29 DIAGNOSIS — S22.000D COMPRESSION FRACTURE OF THORACIC VERTEBRA WITH ROUTINE HEALING, UNSPECIFIED THORACIC VERTEBRAL LEVEL, SUBSEQUENT ENCOUNTER: ICD-10-CM

## 2021-09-29 PROCEDURE — 72146 MRI CHEST SPINE W/O DYE: CPT | Mod: GC | Performed by: RADIOLOGY

## 2021-09-29 NOTE — TELEPHONE ENCOUNTER
[facility] Call Center    Phone Message    May a detailed message be left on voicemail: yes     Reason for Call:  Tien Rick represents patient on social security disability claim.  Patient has a hearing on 10/6/21 and they would like Dr. Chua to speak on behalf of patient and her diagnosis of neuropathy and how this affects patients functioning.  Please reach out to Sumi.  Law firm will also fax letter to clinic.    Action Taken: Message routed to:  [facility] Neurology    Travel Screening: Not Applicable

## 2021-09-29 NOTE — TELEPHONE ENCOUNTER
[facility] Call Center    Phone Message    May a detailed message be left on voicemail: no     Reason for Call: Other: Hilaria calling to request the forms that were sent (on 9/27/21) form [employer] be sent back asap. She stated that they need to receive them by 10/5/21. Please call Hilaria with any questions or concerns.     Action Taken: Message routed to:  [facility]    Travel Screening: Not Applicable

## 2021-10-01 ENCOUNTER — OFFICE VISIT (OUTPATIENT)
Dept: NEUROSURGERY | Facility: CLINIC | Age: 31
End: 2021-10-01
Payer: COMMERCIAL

## 2021-10-01 VITALS — SYSTOLIC BLOOD PRESSURE: 120 MMHG | DIASTOLIC BLOOD PRESSURE: 86 MMHG | HEART RATE: 93 BPM | RESPIRATION RATE: 20 BRPM

## 2021-10-01 DIAGNOSIS — G89.29 CHRONIC MIDLINE THORACIC BACK PAIN: Primary | ICD-10-CM

## 2021-10-01 DIAGNOSIS — M54.6 CHRONIC MIDLINE THORACIC BACK PAIN: Primary | ICD-10-CM

## 2021-10-01 PROCEDURE — 99213 OFFICE O/P EST LOW 20 MIN: CPT | Performed by: PHYSICIAN ASSISTANT

## 2021-10-01 NOTE — PROGRESS NOTES
Neurosurgery follow-up    Ms. Bonner is a 30-year-old female who been previously evaluated for multiple thoracic compression fractures.  She has been in a brace for 3 months this was weaned about 3 months ago now and she reported increased back pain again after that.  A thoracic MRI was performed and she is here for review of that today.    Exam    B/P: 120/86, T: Data Unavailable, P: 93, R: 20     Alert and oriented no acute distress  Thoracic spine nontender to palpation  Moving all extremities appropriately      Imaging    Thoracic MRI demonstrates stable compression deformities at T6 T9 and T10 with resolution of previously seen edema.  No new compression deformities are noted.    Assessment    Healed thoracic compression fractures.  Thoracic back pain      Plan    I recommend the patient meet with pool therapy, continue physical therapy, and have also provided her a chiropractic referral per her request.  She can follow-up with us as needed.      Total time of 30 minutes spent with the patient today in counseling and coordination of care.

## 2021-10-01 NOTE — LETTER
10/1/2021         RE: Hilaria Bonner  [address]  [address]  [facility] [address]        Dear Colleague,    Thank you for referring your patient, Hilaria Bonner, to the [facility] [place]. Please see a copy of my visit note below.    Neurosurgery follow-up    Ms. Bonner is a 30-year-old female who been previously evaluated for multiple thoracic compression fractures.  She has been in a brace for 3 months this was weaned about 3 months ago now and she reported increased back pain again after that.  A thoracic MRI was performed and she is here for review of that today.    Exam    B/P: 120/86, T: Data Unavailable, P: 93, R: 20     Alert and oriented no acute distress  Thoracic spine nontender to palpation  Moving all extremities appropriately      Imaging    Thoracic MRI demonstrates stable compression deformities at T6 T9 and T10 with resolution of previously seen edema.  No new compression deformities are noted.    Assessment    Healed thoracic compression fractures.  Thoracic back pain      Plan    I recommend the patient meet with pool therapy, continue physical therapy, and have also provided her a chiropractic referral per her request.  She can follow-up with us as needed.      Total time of 30 minutes spent with the patient today in counseling and coordination of care.        Again, thank you for allowing me to participate in the care of your patient.        Sincerely,        Thad Vázquez PA-C

## 2021-10-05 ENCOUNTER — HOME INFUSION (PRE-WILLOW HOME INFUSION) (OUTPATIENT)
Dept: PHARMACY | Facility: CLINIC | Age: 31
End: 2021-10-05

## 2021-10-05 ENCOUNTER — TELEPHONE (OUTPATIENT)
Dept: FAMILY MEDICINE | Facility: CLINIC | Age: 31
End: 2021-10-05

## 2021-10-05 DIAGNOSIS — R63.1 INCREASED THIRST: ICD-10-CM

## 2021-10-05 DIAGNOSIS — Z13.1 SCREENING FOR DIABETES MELLITUS: Primary | ICD-10-CM

## 2021-10-05 NOTE — TELEPHONE ENCOUNTER
Please call patient and advise to schedule fasting labs (nothing to eat or drink for 9 hours except water and/or medications ) to check a basic metabolic panel and blood sugar over the last 3 months- I was notified by infusion provider that she has been more thirsty-

## 2021-10-06 ENCOUNTER — HOME INFUSION (PRE-WILLOW HOME INFUSION) (OUTPATIENT)
Dept: PHARMACY | Facility: CLINIC | Age: 31
End: 2021-10-06

## 2021-10-06 ENCOUNTER — VIRTUAL VISIT (OUTPATIENT)
Dept: BEHAVIORAL HEALTH | Facility: CLINIC | Age: 31
End: 2021-10-06
Attending: PSYCHIATRY & NEUROLOGY
Payer: COMMERCIAL

## 2021-10-06 DIAGNOSIS — F41.1 GAD (GENERALIZED ANXIETY DISORDER): ICD-10-CM

## 2021-10-06 DIAGNOSIS — F29 PSYCHOSIS, UNSPECIFIED PSYCHOSIS TYPE (H): ICD-10-CM

## 2021-10-06 PROCEDURE — 99215 OFFICE O/P EST HI 40 MIN: CPT | Mod: 95 | Performed by: PSYCHIATRY & NEUROLOGY

## 2021-10-06 PROCEDURE — 99417 PROLNG OP E/M EACH 15 MIN: CPT | Mod: 95 | Performed by: PSYCHIATRY & NEUROLOGY

## 2021-10-06 RX ORDER — OLANZAPINE 10 MG/1
10 TABLET ORAL AT BEDTIME
Qty: 30 TABLET | Refills: 3 | Status: SHIPPED | OUTPATIENT
Start: 2021-10-06 | End: 2021-11-04

## 2021-10-06 RX ORDER — VENLAFAXINE HYDROCHLORIDE 150 MG/1
150 CAPSULE, EXTENDED RELEASE ORAL DAILY
Qty: 30 CAPSULE | Refills: 2 | Status: CANCELLED | OUTPATIENT
Start: 2021-10-06

## 2021-10-06 RX ORDER — PRAZOSIN HYDROCHLORIDE 1 MG/1
2 CAPSULE ORAL AT BEDTIME
Qty: 60 CAPSULE | Refills: 2 | Status: SHIPPED | OUTPATIENT
Start: 2021-10-06 | End: 2022-01-04

## 2021-10-06 RX ORDER — HYDROXYZINE HYDROCHLORIDE 25 MG/1
25 TABLET, FILM COATED ORAL EVERY 6 HOURS PRN
Qty: 120 TABLET | Refills: 2 | Status: SHIPPED | OUTPATIENT
Start: 2021-10-06 | End: 2022-05-13

## 2021-10-06 NOTE — NURSING NOTE
This video/telephone visit will be conducted via a call between you and your physician/provider. We have found that certain health care needs can be provided without the need for an in-person physical exam. This service lets us provide the care you need with a video /telephone conversation. If a prescription is necessary we can send it directly to your pharmacy. If lab work is needed we can place an order for that and you can then stop by our lab to have the test done at a later time.    Just as we bill insurance for in-person visits, we also bill insurance for video/telephone visits. If you have questions about your insurance coverage, we recommend that you speak with your insurance company.    Patient has given verbal consent for video/Telephone visit? Yes  Patient would like telephone visit, please call: [phone]  JIM/RACQUEL : Young PERRY LPN  Pt is transferring care to a long term psychiatry. Pt reports taking:   Effexor  mg a day, Vistaril PRN and Prazosin 2 mg  Patient verified allergies, medications and pharmacy via phone. Patient states she  is ready for visit.    ________________________________________  Medications Phoned  to Pharmacy [] yes [x]no  Name of Pharmacist:  List Medications, including dose, quantity and instructions    Medications ordered this visit were e-scribed.  Verified by order class [x] yes  [] no  Atarax, Zyprexa, and Prazosin  Medication changes or discontinuations were communicated to patient's pharmacy: [] yes  [x] no    Dictation completed at time of chart check: [x] yes  [] no    I have checked the documentation for today s encounters and the above information has been reviewed and completed.
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
## 2021-10-06 NOTE — PATIENT INSTRUCTIONS
I have restarted the patient's Zyprexa at 10 mg nightly.  She apparently inadvertently stopped this medication a few weeks ago.  Risks and benefits were discussed.  I have also referred the patient for an individual therapist.  Apparently this was the intention of her prior psychiatrist and psychologist.  I have also requested that the patient discontinue using the medical marijuana if it is not helping with her neuropathy.  She is going to discuss this with her medical team in the near future.  The patient will return to this clinic in 6 to 8 weeks for medication check.  She agrees to call before then with any questions or concerns.

## 2021-10-07 ENCOUNTER — DOCUMENTATION ONLY (OUTPATIENT)
Dept: PHARMACY | Facility: CLINIC | Age: 31
End: 2021-10-07

## 2021-10-07 ENCOUNTER — HOME INFUSION (PRE-WILLOW HOME INFUSION) (OUTPATIENT)
Dept: PHARMACY | Facility: CLINIC | Age: 31
End: 2021-10-07

## 2021-10-07 NOTE — PROGRESS NOTES
Skilled Nurse visit in the  patient home to administer Privigen 60g.  No recent elevated temperature, fever, chills, productive cough, coughing for 3 weeks or longer or hemoptysis, abnormal vital signs, night sweats, chest pain. No  decrease in your appetite, unexplained weight loss or fatigue.  No other new onset medical symptoms.  Current weight 285 lbs  PIV placed left forearm, 1 attempt/s.  Pre medicated with Acetaminophen 650 mg by mouth, 30 minutes prior to infusion Diphenhydramine 50 mg by mouth, 30 minutes prior to infusion. Hydrocortisone 100 mg IV push over 2-5 minutes, 30 minutes prior to infusion.  Infusion completed without complication or reaction. Pt reports therapy is effective  in managing symptoms related to therapy.      Love Ribera RN, BSN  [facility]  [phone]  [email or web address]

## 2021-10-08 ENCOUNTER — TELEPHONE (OUTPATIENT)
Dept: NEUROSURGERY | Facility: CLINIC | Age: 31
End: 2021-10-08

## 2021-10-08 DIAGNOSIS — M54.6 CHRONIC MIDLINE THORACIC BACK PAIN: Primary | ICD-10-CM

## 2021-10-08 DIAGNOSIS — G89.29 CHRONIC MIDLINE THORACIC BACK PAIN: Primary | ICD-10-CM

## 2021-10-08 NOTE — TELEPHONE ENCOUNTER
Reason for call: Radha Bhakta called to advise the PT order is blank so they need it resubmitted with the diagnosis and if it is PT or OT, electronic signature and faxed to [phone]. Thank you.

## 2021-10-09 ENCOUNTER — HEALTH MAINTENANCE LETTER (OUTPATIENT)
Age: 31
End: 2021-10-09

## 2021-10-11 ENCOUNTER — VIRTUAL VISIT (OUTPATIENT)
Dept: BEHAVIORAL HEALTH | Facility: CLINIC | Age: 31
End: 2021-10-11
Payer: COMMERCIAL

## 2021-10-11 DIAGNOSIS — F32.1 MODERATE MAJOR DEPRESSION (H): ICD-10-CM

## 2021-10-11 DIAGNOSIS — F29 PSYCHOSIS, UNSPECIFIED PSYCHOSIS TYPE (H): ICD-10-CM

## 2021-10-11 DIAGNOSIS — F41.1 GAD (GENERALIZED ANXIETY DISORDER): Primary | ICD-10-CM

## 2021-10-11 PROCEDURE — 90834 PSYTX W PT 45 MINUTES: CPT | Mod: 95 | Performed by: SOCIAL WORKER

## 2021-10-11 NOTE — PROGRESS NOTES
"Collaborative Care Psychiatry Service (CCPS)  October 11, 2021    Behavioral Health Clinician Progress Note    Patient Name: Hilaria Bonner is a 30 year old female who is being evaluated via a telephone visit.      The patient has been notified of the following:     \"We have found that certain health care needs can be provided without the need for a face to face visit.  This service lets us provide the care you need with a short phone conversation.      I will have full access to your [place] medical record during this entire phone call.   I will be taking notes for your medical record.     Since this is like an office visit, we will bill your insurance company for this service.  Please check with your medical insurance if this type of telephone visit/virtual care is covered.  You may be responsible for the cost of this service if insurance coverage is denied.      There are potential benefits and risks of telephone visits (e.g. limits to patient confidentiality) that differ from in-person visits.?  Confidentiality still applies for telephone services, and nobody will record the visit.  It is important to be in a quiet, private space that is free of distractions (including cell phone or other devices) during the visit.??     If during the course of the call I believe a telephone visit is not appropriate, you will not be charged for this service\"    Consent has been obtained for this service by care team member: yes.           Service Type:  Individual      Service Location:   Phone call (patient / identified key support person reached)     Session Start Time: 2:19p  Session End Time: 3:05p      Session Length: 38 - 52      Attendees: Client    Visit Activities (Refresh list every visit): [facility] [place] Only    Diagnostic Assessment Date: 3/22/21  See Flowsheets for today's PHQ-9 and MELODY-7 results  Previous PHQ-9:   PHQ-9 SCORE 7/22/2021 8/13/2021 9/1/2021   PHQ-9 Total Score MyChart 20 (Severe " "depression) 19 (Moderately severe depression) 20 (Severe depression)   PHQ-9 Total Score 20 19 20   PHQ-A Total Score - - -       Previous MELODY-7:   MELODY-7 SCORE 7/14/2021 7/22/2021 9/1/2021   Total Score 11 (moderate anxiety) 11 (moderate anxiety) 15 (severe anxiety)   Total Score 11 11 15       WHODAS  WHODAS 2.0 Total Score 3/22/2021   Total Score 40        AUDIT  No flowsheet data found.    DATA  Extended Session (60+ minutes): No  Interactive Complexity: No  Crisis: No    Medication Compliance:  Yes      Chemical Use Review:   Substance Use: Chemical use reviewed, no active concerns identified -pt reports that she has continued to refrain from alcohol use since last session.     Tobacco Use: No change in amount of tobacco use since last session.  Patient declined discussion at this time    Current Stressors / Issues:  Pt shares that things are \"the same\" as they have been over the past several months.  Pt reports that she did have her initial appt with Dr. Torres the psychiatrist.  She was started on Zyprexa but has only been on the medication for a few days now so hasn't noticed changes.  Did have a SSDI hearing on 10/6/21and pt was approved which she is thankful for.  Would like to be able to get a new apartment once her social security comes through.  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    Pt shares that she was referred for pool therapy and is hoping that she will be able to get started on that soon. Has an appt with Neurology next month and pt reports she is anxious to hear about what neurologist might recommend next.  Pt was open to a care coordination referral being placed as she shares she is from [place] and isn't sure how to go about looking for housing options.         Progress on Treatment Objective(s) / Homework:  Minimal progress - PREPARATION (Decided to change - considering how); Intervened by negotiating a change " plan and determining options / strategies for behavior change, identifying triggers, exploring social supports, and working towards setting a date to begin behavior change    Motivational Interviewing    MI Intervention: Expressed Empathy/Understanding, Supported Autonomy, Collaboration, Evocation, Permission to raise concern or advise, Open-ended questions and Change talk (evoked)     Change Talk Expressed by the Patient: Desire to change Reasons to change    Provider Response to Change Talk: E - Evoked more info from patient about behavior change and A - Affirmed patient's thoughts, decisions, or attempts at behavior change    Also provided psychoeducation about behavioral health condition, symptoms, and treatment options      Review of Symptoms per patient report:  Depression: Lack of interest, Change in energy level, Difficulties concentrating, Feelings of hopelessness, Irritability and Feeling sad, down, or depressed  Candice:  No Symptoms  Psychosis: Auditory hallucinations and reports vivid nightmares.  Hears noices/voices like a radio station being on all the time.  Anxiety: Excessive worry and Social anxiety  Panic:  Palpitations and Shortness of breath  Post Traumatic Stress Disorder:  Nightmares   Eating Disorder: No Symptoms  ADD / ADHD:  No symptoms  Conduct Disorder: No symptoms  Autism Spectrum Disorder: No symptoms  Obsessive Compulsive Disorder: No Symptoms      Changes in Health Issues:   None reported-Continues with health issues and concerns following a heart attack 2 years ago and since having COVID last year.    Assessment: Current Emotional / Mental Status (status of significant symptoms):  Risk status (Self / Other harm or suicidal ideation)  Patient denies a history of suicidal ideation, suicide attempts, self-injurious behavior, homicidal ideation, homicidal behavior and and other safety concerns  Patient denies current fears or concerns for personal safety.  Patient denies current or recent  suicidal ideation or behaviors.  Patient denies current or recent homicidal ideation or behaviors.  Patient denies current or recent self injurious behavior or ideation.  Patient denies other safety concerns.  A safety and risk management plan has not been developed at this time, however patient was encouraged to call [place] [phone] should there be a change in any of these risk factors.    Appearance:   phone visit   Eye Contact:   phone visit   Psychomotor Behavior: phone visit   Attitude:   Cooperative   Orientation:   All  Speech   Rate / Production: Normal    Volume:  Normal   Mood:    Normal  Affect:    Appropriate   Thought Content:  Clear   Thought Form:  Coherent  Logical   Insight:    Good     Diagnoses:  1. MELODY (generalized anxiety disorder)    2. Psychosis, unspecified psychosis type (H)    3. Moderate major depression (H)        Collateral Reports Completed:  Not Applicable    Plan: (Homework, other):  Patient was given information about behavioral services and encouraged to schedule a follow up appointment with the clinic [facility].  She was also given information about mental health symptoms and treatment options .  Pt will follow up with [facility] in 2 weeks. CD Recommendations: pt declines any CD resources at this time.  Has been sober for past several months and doesn't feel formal programming is needed at this time. .  Dana Harrison [facility], [facility]      Dana Harrison [facility], [facility]       October 11, 2021

## 2021-10-11 NOTE — PROGRESS NOTES
Hilaria is a 30 year old who is being evaluated via a billable telephone visit.    Is Pt currently in [place]? Yes    NOTE:  If Pt is not in [place], Appointment needs to be canceled and rescheduled.  What phone number would you like to be contacted at? [phone] unable to do video. Camera is broken  How would you like to obtain your AVS? Ferdinand Wilson CMA   [facility]   Pain Management Center    [facility]     Date of visit: 10/12/2021      Assessment:   Hilaria Bonner is a 30 year old female with a past medical history significant for PE with associated PEA arrest, morbid obesity s/p sleeve gastrectomy, pancreatitis, and recent COVID 19 who presents with complaints of upper and lower extremity pain.      1. Upper and lower extremity pain- etiology may be an underlying autoimmune condition triggered by COVID polyneuritis, undergoing evaluation and treatment with neurology.  2. Alcohol dependence: has referral to see addiction medicine.    3. Mental Health - the patient's mental health concerns, specifically anxiety, affect her experience of pain and contribute to her clinically significant distress.    Visit Diagnoses:  1. Chronic inflammatory demyelinating polyneuropathy (H)        Plan:     1.  Pain Physical Therapy:    YES  Pool therapy was recently ordered by Thad TOVAR. We discussed the many benefits of this, I would recommend. She will start pool therapy as directed by Thad TOVAR.    2.  Pain Psychologist to address relaxation, behavioral change, coping style, and other factors important to improvement.     Continue visits psychiatry, recently established with Dr. Torres and therapist Dana Harrison.    3.  Medication Management:     1. Continue Lyrica 200mg TID- does provide some benefit.     2. Continue Effexor 150mg daily. Unclear benefit from Effexor yet but tolerated well. I advised she continue working with Dr. Torres and following his medication  management recommendations.    3. We discussed given potential concerns by Dr. Torres of exacerbating psychosis Hilaria should discontinue use of medical cannabis.     4. We may consider a trial of low dose naltrexone in the future. We discussed that this medication is not FDA approved for chronic pain but recent evidence suggests it can help with pain.    4.  Potential procedures: not at this time.     5.  Referrals: Hilaria is interested in trying chiropractic care for midback pain. We disucssed this is a reasonable option. She will start chiropractic care as planned.    6.  Follow up with RG Puri CNP in 8 weeks.      Janki DIEZ CNP  [facility] Pain Management     -------------------------------------------------    Subjective:    Chief complaint:   Chief Complaint   Patient presents with     Pain       Interval history:  Hilaria Bonner is a 30 year old female last seen on 8/31/2021.  She is seen in follow up.     Recommendations/plan at the last visit included:  1.  Pain Physical Therapy:     YES   I would recommend continuing physical therapy per neurosurgery- Thad TOVAR.    2.  Pain Psychologist to address relaxation, behavioral change, coping style, and other factors important to improvement.    Encouraged Hilaria continue to work with psychiatry, Dr. Matute. She has an appointment with psychology, Dr. Torres scheduled in October. She could consider meeting with a health psychologist instead if wanted to be seen sooner.   3.  Medication Management:     1. Hilaria tapered off of Cymbalta and started Effexor for 2 weeks but then stopped as she was confused about which medications she should be taking. She is interested in trying Effexor again. She will restart Effexor 37.5mg daily. Monitor pain benefit. If tolerated well, will increase in the future. Follow up with Dr. Matute for additional medication management.     2. Continue Lyrica 200mg TID.     3. Can  "continue tizanidine 4mg two times daily prn for now.     4. Continue medical cannabis as able.   4.  Potential procedures: not at this time.      5.  Can stop TENS unit if not helping.    6.  Follow up with RG Puri CNP in 6-8 weeks.        Since her last visit, Hilaria Bonner reports:  -Her pain is about the same as it was at last visit, \"overall it still feels like the same.\"   -She is interested in continuing to work with the pain clinic.   -She continues Lyrica 200mg TID.   -She has continued to see Dr. Matute with Psychiatry, recently established with Dr. Torres. She increased Effexor as directed to 150mg daily. She is unsure if this has helped with pain or mood. She also stopped taking Zyprexa but was redirected to restart so plans to do so. He also recommended against medical cannabis due to concern to auditory hallucinations. Hydroxyzine was has been somewhat helpful.   -She has continued medical cannabis but only uses two to three times a week for anxiety. She comments that she has had auditory hallucinations since cardiac problems previously.   -She had follow up with Thad TOVAR, her thoracic compression fractures are healed. She still reports some back pain that has been bothersome. She will be starting pool therapy and chiropractic care  -She continues visits with therapist Dana Harrison with some benefit.        Current pain medications:               Lyrica 200mg TID- [facility], \"its more manageable with the Lyrica\" more effective than gabapentin    Effexor 150mg daily- ?    tizanidine 4mg TID prn- [facility] for muscle spasm, usually takes for a few days around infusions              Hydroxyzine 25-50mg QID prn- H for anxiety              Folate 1mg daily                       Medical cannabis (THC dominant vapor and pills) - H with sleep but not pain, \"it just mellows me out,\" using 2-3 days a week               Zyprexa 10mg at bedtime prn- ?, recently restarting   Prazosin 1mg at " "bedtime- SW, sleeping better    Current MME: 0    Review of [place] Prescription Monitoring Program (): No concern for abuse or misuse of controlled medications based on this report.     Annual Controlled Substance Agreement/UDS due date: NA    Past pain treatments:  1. Previous Pain Relevant Medications:              Opiates: Tramadol- SW, oxycodone- ?/[facility]              NSAIDS: doesn't take anti-inflammatories due to gastric bypass              Muscle Relaxants: tizanidine- H for sleep only, Robaxin- NH              Anti-migraine mediations: no              Anti-depressants: amitriptyline (up to 75mg)- ?, \"it put me to sleep\", Cymbalta- ?/NH for pain, Wellbutrin- NH, Effexor- ?              Sleep aids: no              Anxiolytics: hydroxyzine- H               Neuropathics: Lyrica- SW, gabapentin (started on for numbness in toes and migraines after cardiac arrest)- SE, fatigue, Topamax- H for weight loss, NH for pain              Topicals: gabapentin cream- NH/SW, lidocaine cream- NH, W, burning, capsaicin cream- NH, W, burning              Other medications not covered above: Tylenol- NH     2. Physical Therapy: neuro PT and OT- [facility], will be starting pool therapy  3. Pain Psychology: yes Jasmin Prince PsyD - [facility]  4. Surgery: gastric bypass March 2019  5. Injections: no  6. Chiropractic: no, recommended by Thad TOVAR  7. Acupuncture: yes x4 sessions with José Kunz DC - [place]  8. TENS Unit: yes- [place], still uses on a regular basis.     Medications:  Current Outpatient Medications   Medication Sig Dispense Refill     Blood Pressure Monitoring (BLOOD PRESSURE MONITOR/ARM) BRAYAN        diphenhydrAMINE (BENADRYL) 50 MG/ML injection        folic acid (FOLVITE) 1 MG tablet Take 1 tablet (1 mg) by mouth daily 30 tablet 11     hydrochlorothiazide (HYDRODIURIL) 12.5 MG tablet TAKE 1 TABLET(12.5 MG) BY MOUTH DAILY 30 tablet 1     hydrOXYzine (ATARAX) 25 MG tablet Take 1 tablet (25 mg) by mouth every 6 hours as " needed for anxiety 120 tablet 2     lisinopril (ZESTRIL) 10 MG tablet Take 1 tablet (10 mg) by mouth daily 90 tablet 0     medroxyPROGESTERone (DEPO-PROVERA) 150 MG/ML IM injection Inject 150 mg into the muscle every 3 months       Multiple Vitamins-Minerals (MULTIVITAMIN ADULT) CHEW Take 1 chew tab by mouth 2 times daily 60 tablet 11     [facility] MEDICAL CANABIS       OLANZapine (ZYPREXA) 10 MG tablet Take 1 tablet (10 mg) by mouth At Bedtime 30 tablet 3     OLANZapine (ZYPREXA) 10 MG tablet Take 1 tablet (10 mg) by mouth At Bedtime 30 tablet 0     omeprazole (PRILOSEC) 20 MG DR capsule Take 1 capsule (20 mg) by mouth daily 28 capsule 11     ondansetron (ZOFRAN-ODT) 4 MG ODT tab Take 1 tablet (4 mg) by mouth every 8 hours as needed for nausea 30 tablet 1     polyethylene glycol (MIRALAX) 17 GM/SCOOP powder Take 17 g (1 capful) by mouth daily 850 g 3     potassium chloride ER (KLOR-CON M) 20 MEQ CR tablet Take 1 tablet (20 mEq) by mouth 2 times daily 180 tablet 0     prazosin (MINIPRESS) 1 MG capsule Take 2 capsules (2 mg) by mouth At Bedtime 60 capsule 2     Pregabalin (LYRICA) 200 MG capsule Take 1 capsule (200 mg) by mouth 3 times daily 90 capsule 3     PRIVIGEN 20 GM/200ML SOLN        PRIVIGEN 40 GM/400ML SOLN        rivaroxaban ANTICOAGULANT (XARELTO ANTICOAGULANT) 20 MG TABS tablet Take 1 tablet (20 mg) by mouth daily (with dinner) 90 tablet 0     tiZANidine (ZANAFLEX) 2 MG tablet Take 1-2 tablets (2-4 mg) by mouth 3 times daily as needed for muscle spasms 50 tablet 1     UNABLE TO FIND 2,500 mcg by Oral or Feeding Tube route daily MEDICATION NAME: Vitamin B12       UNABLE TO FIND 5,000 mcg by Oral or Feeding Tube route daily MEDICATION NAME: OTC Biotin       UNABLE TO FIND 1 tablet by Oral or Feeding Tube route daily MEDICATION NAME: OTC Probiotics       UNABLE TO FIND 1 tablet by Oral or Feeding Tube route daily MEDICATION NAME: OTC-Vitamin B complex       venlafaxine (EFFEXOR-XR) 150 MG 24 hr capsule Take 1  capsule (150 mg) by mouth daily 30 capsule 2     vitamin B-Complex Take 1 tablet by mouth daily 90 tablet 5     vitamin C (ASCORBIC ACID) 1000 MG TABS Take 1 tablet (1,000 mg) by mouth daily 30 tablet 0     vitamin D3 (CHOLECALCIFEROL) 50 mcg (2000 units) tablet TAKE 1 TABLET BY MOUTH DAILY 30 tablet 0     COMPOUNDED NON-CONTROLLED SUBSTANCE (CMPD RX) - PHARMACY TO MIX COMPOUNDED MEDICATION Apply 1-2 g topically 3 times daily as needed (pain) Apply three times daily as needed for pain. (Patient not taking: Reported on 9/2/2021) 120 g 1       Medical History: any changes in medical history since they were last seen? No    Review of Systems: A 10-point review of systems was negative, with the exception of chronic pain issues and numbness and tingling.     Objective:    Physical Exam:  Constitutional: Well developed, well nourished, appears stated age. Obese.   HEENT: Head atraumatic, normocephalic. Eyes without conjunctival injection or jaundice. Oropharynx clear. Neck supple. No obvious neck masses.  Skin: No rash, lesions, or petechiae of exposed skin.   Psychiatric/mental status: Alert, without lethargy or stupor. Speech fluent. Appropriate affect. Mood normal. Able to follow commands without difficulty.     Extreme Pain (8)    BILLING TIME DOCUMENTATION:   The total TIME spent on this patient on the date of the encounter/appointment was 23 minutes.      TOTAL TIME includes:   Time spent preparing to see the patient (reviewing records and tests)   Time spent face to face (or over the phone) with the patient   Time spent ordering tests, medications, procedures and referrals   Time spent Referring and communicating with other healthcare professionals   Time spent documenting clinical information in Epic     Phone call duration: 25 minutes

## 2021-10-12 ENCOUNTER — VIRTUAL VISIT (OUTPATIENT)
Dept: PALLIATIVE MEDICINE | Facility: CLINIC | Age: 31
End: 2021-10-12
Payer: COMMERCIAL

## 2021-10-12 ENCOUNTER — PATIENT OUTREACH (OUTPATIENT)
Dept: NURSING | Facility: CLINIC | Age: 31
End: 2021-10-12
Payer: COMMERCIAL

## 2021-10-12 ENCOUNTER — THERAPY VISIT (OUTPATIENT)
Dept: PHYSICAL THERAPY | Facility: CLINIC | Age: 31
End: 2021-10-12
Payer: COMMERCIAL

## 2021-10-12 DIAGNOSIS — G89.4 CHRONIC PAIN SYNDROME: ICD-10-CM

## 2021-10-12 DIAGNOSIS — M54.6 BILATERAL THORACIC BACK PAIN, UNSPECIFIED CHRONICITY: Primary | ICD-10-CM

## 2021-10-12 DIAGNOSIS — G61.81 CHRONIC INFLAMMATORY DEMYELINATING POLYNEUROPATHY (H): Primary | ICD-10-CM

## 2021-10-12 PROCEDURE — 97110 THERAPEUTIC EXERCISES: CPT | Mod: GP | Performed by: PHYSICAL THERAPIST

## 2021-10-12 PROCEDURE — 97112 NEUROMUSCULAR REEDUCATION: CPT | Mod: GP | Performed by: PHYSICAL THERAPIST

## 2021-10-12 PROCEDURE — 99214 OFFICE O/P EST MOD 30 MIN: CPT | Mod: 95 | Performed by: NURSE PRACTITIONER

## 2021-10-12 ASSESSMENT — PAIN SCALES - GENERAL: PAINLEVEL: EXTREME PAIN (8)

## 2021-10-12 NOTE — PATIENT INSTRUCTIONS
1.  Pain Physical Therapy:    YES   Start pool therapy as directed by Thad TOVAR.    2.  Pain Psychologist to address relaxation, behavioral change, coping style, and other factors important to improvement.     Continue visits psychiatry, recently established with Dr. Torres and therapist Daan Harrison.    3.  Medication Management:     1. Continue Lyrica 200mg three times daily.     2. Continue Effexor 150mg daily. Continue working with Brian and following his medication management recommendations.    3. Discontinue medical cannabis. Monitor auditory hallucinations and anxiety.    4. We may consider a trial of low dose naltrexone in the future. This medication is not FDA approved for chronic pain but recent evidence suggests it can help with pain.    4.  Potential procedures: not at this time.     5.  Referrals: start chiropractic care as planned.    6.  Follow up with RG Puri CNP in 8 weeks.       ----------------------------------------------------------------  Clinic Number:  [phone]     Call with any questions about your care and for scheduling assistance.     Calls are returned Monday through Friday between 8 AM and 4:30 PM. We usually get back to you within 2 business days depending on the issue/request.    If we are prescribing your medications:    For opioid medication refills, call the clinic or send a Within3 message 7 days in advance.  Please include:    Name of requested medication    Name of the pharmacy.    For non-opioid medications, call your pharmacy directly to request a refill. Please allow 3-4 days to be processed.     Per [place] State Law:    All controlled substance prescriptions must be filled within 30 days of being written.      For those controlled substances allowing refills, pickup must occur within 30 days of last fill.      We believe regular attendance is key to your success in our program!      Any time you are unable to keep your appointment we ask that  you call us at least 24 hours in advance to cancel.This will allow us to offer the appointment time to another patient.     Multiple missed appointments may lead to dismissal from the clinic.

## 2021-10-12 NOTE — PROGRESS NOTES
This is a recent snapshot of the patient's [facility] medical record.  For current drug dose and complete information and questions, call [phone] or In Basket pool, [facility] home infusion ([id number])  CSN Number:  [id number]

## 2021-10-12 NOTE — PROGRESS NOTES
Clinic Care Coordination Contact  Community Health Worker Initial Outreach    CHW Initial Information Gathering:  Referral Source: Behavioral Health Clinician  Preferred Hospital:  (none)  Preferred Urgent Care:  (None)  Current living arrangement:: I live in a private home with family  Type of residence:: Apartment  Community Resources: [employer], Financial/Insurance  Supplies used at home:: Compression Stockings  Equipment Currently Used at Home: walker, standard  Informal Support system:: Children, Parent, Family  Transportation means:: Family, Friend, Medical transport  CHW Additional Questions  If ED/Hospital discharge, follow-up appointment scheduled as recommended?: N/A  Medication changes made following ED/Hospital discharge?: N/A  MyChart active?: Yes  Patient sent Social Determinants of Health questionnaire?: Yes    Patient accepts CC: Yes. Patient scheduled for assessment with CC SW on 10/15/21 at 11:00 am . Patient noted desire to discuss Patient needs help with housing..   SANGEETA Young  Clinic Care Coordination  [facility] Clinics: [place], [place]  Phone: [phone]    Housing  Financial Support

## 2021-10-13 ENCOUNTER — VIRTUAL VISIT (OUTPATIENT)
Dept: ENDOCRINOLOGY | Facility: CLINIC | Age: 31
End: 2021-10-13
Payer: COMMERCIAL

## 2021-10-13 DIAGNOSIS — Z98.84 S/P LAPAROSCOPIC SLEEVE GASTRECTOMY: ICD-10-CM

## 2021-10-13 DIAGNOSIS — E66.01 MORBID OBESITY (H): ICD-10-CM

## 2021-10-13 DIAGNOSIS — Z71.3 NUTRITIONAL COUNSELING: Primary | ICD-10-CM

## 2021-10-13 PROCEDURE — 97803 MED NUTRITION INDIV SUBSEQ: CPT | Mod: 95 | Performed by: DIETITIAN, REGISTERED

## 2021-10-13 RX ORDER — CALCIUM CARBONATE 500(1250)
1 TABLET ORAL 2 TIMES DAILY
COMMUNITY
End: 2021-12-20

## 2021-10-13 NOTE — PROGRESS NOTES
"Subjective:  HPI  Physical Exam  Oswestry Score: 44.44 %                 Objective:  System    Physical Exam    General     ROS    Assessment/Plan:    PROGRESS  REPORT    Progress reporting period is from 8/5/2021 to 10/12/2021.    Patient has completed 3 additional PT visits.     SUBJECTIVE  Subjective: Patient returned to MD on 10/1/2021, MRI results discussed with her after recent fall. She returns to clinic concerned about the ongoing constant central mid thoracic and lumbar area. \"It feels like a knot in the center of back.\" Admits she tends to avoid any activity that causes/increases her pain and ends up spending most of day laying down. Standing and walking ability remain the most limited.     Current Pain level: 7/10.     Previous pain level was  3/10   Initial Pain level: 10/10.   Changes in function:  Yes (See Goal flowsheet attached for changes in current functional level)  Adverse reaction to treatment or activity: increased back pain with all activity and all PT Rx.    OBJECTIVE    Objective: Per MRI report, T6, T9, T10 compression fractures are stable without any new lesions noted on MRI. Discussed with pt that pain with activity does equal harm based on MRI results. Encouraged consistent attempts to increase activity slowly. Patient ambulating into clinic without AD, slowly with reduced stride length and absent arm swing. Improved AROM of thoracic spine: flexion min loss, extension mod loss , R rotation min loss and L rotation min loss. Upper abdominal strength remains 3-/5 secondary to increased LBP. NICOLE unchanged at 44%.     ASSESSMENT/PLAN  Updated problem list and treatment plan: Diagnosis 1:  Thoracic Back pain(healing T6 and T9 compression fractures)  Pain -  self management, education, directional preference exercise and home program  Decreased ROM/flexibility - therapeutic exercise, therapeutic activity and home program  Decreased strength - therapeutic exercise, therapeutic activities and " home program  Impaired gait - gait training and home program  Decreased function - therapeutic activities and home program  STG/LTGs have been met or progress has been made towards goals:  Yes (See Goal flow sheet completed today.)  Assessment of Progress: The patient has had set backs in their progress.  Self Management Plans:  Patient has been instructed in a home treatment program.  Patient  has been instructed in self management of symptoms.  I have re-evaluated this patient and find that the nature, scope, duration and intensity of the therapy is appropriate for the medical condition of the patient.  Hilaria continues to require the following intervention to meet STG and LTG's:  PT    Recommendations:  This patient would benefit from continued therapy.     Frequency:  1 X week, once daily  Duration:  for 7 weeks        Please refer to the daily flowsheet for treatment today, total treatment time and time spent performing 1:1 timed codes.

## 2021-10-13 NOTE — TELEPHONE ENCOUNTER
Updated order as requested.     Faxed updated PT order to [facility] October 13, 2021 to fax number [phone]    Right Fax confirmed at 2:51 PM.    Ricardo Olivia RN

## 2021-10-13 NOTE — LETTER
"10/13/2021       RE: Hilaria Bonner  [address]  [address]  [facility] [address]     Dear Colleague,    Thank you for referring your patient, Hilaria Bonner, to the [facility] [place] [facility]. Please see a copy of my visit note below.    Hilaria Bonner is a 30 year old female who is being evaluated via a billable telephone visit.      The patient has been notified of following:     \"This telephone visit will be conducted via a call between you and your physician/provider. We have found that certain health care needs can be provided without the need for a physical exam.  This service lets us provide the care you need with a short phone conversation.  If a prescription is necessary we can send it directly to your pharmacy.  If lab work is needed we can place an order for that and you can then stop by our lab to have the test done at a later time.    Telephone visits are billed at different rates depending on your insurance coverage. During this emergency period, for some insurers they may be billed the same as an in-person visit.  Please reach out to your insurance provider with any questions.    If during the course of the call the physician/provider feels a telephone visit is not appropriate, you will not be charged for this service.\"    Patient has given verbal consent for Telephone visit?  Yes    What phone number would you like to be contacted at? [phone]    How would you like to obtain your AVS? Interviewstreethart    Phone call duration: 19 min    During this virtual visit the patient is located in [place], patient verifies this as the location during the entirety of this visit.       Reason For Visit:  Hilaria Bonner is a 30 year old female, completed a virtual visit today for nutrition follow-up s/p SG with Dr Lopez (3/26/19).  Patient referred by Joanne TOVAR.      Anthropometrics  Initial Consult Weight: " "315.4 lbs  Day of Surgery Weight(3/26/19): 291.2 lbs    Current Weight:   Estimated body mass index is 45 kg/m  as calculated from the following:    Height as of 8/13/21: 1.695 m (5' 6.75\").    Weight as of 8/13/21: 129.4 kg (285 lb 3.2 oz).     Current weight: Unknown - scale broken. Pt wants to buy a new one this month.    Current Vitamins/Minerals:  MVI with iron 2x/day   Calcium Citrate - not taking. Has been taking Vit C instead. Got them mixed up per pt. However, given pt's hx it may be possible she is taking this to help with iron absorption given blood status.   Vitamin D3 (2000 international unit(s))  B-50 complex    Oct 13th - reports she is taking chewable Ca (not sure dose), Vit D3, MVI with iron, B-50 complex 1x/day, folate 1x/day, potassium chloride 2x/day, B12 injection (needs to schedule - had last on 9/3/21)  - *RD forgot to check in on Vit C    Labs  BMP done 8/13  - electrolytes WNL    B6 done 6/11/21 - normal     A1C 3/2/21 - normal at 5.0, was 5.7 3 months prior to that      Mg 2/7/21 - normal     Vit D, Folate, Cu, Vit E, Vit B1, B12, B6 1/13/21 - all normal besides Cu slightly high, B1 slightly high, B6 slightly high    Ferritin 12/17/20 - normal    Lipids 12/17/20 - chol 200, LDL calculated 112    Vit A 12/17/20 - normal    Nutrition History:     August 11 2021  Pt has not seen RD since 12/16/2020    No appetite for ~ 2 weeks, feeling full off of very little amounts    Recall:  Shrimp fried rice   Fluids: water (5-6 bottles)  Does not recall if she ate anything else    Has been trying to cut back on fluids, reports last RD told her she was drinking way too much . Was drinking around  oz. Reports she tries to stay hydrated for treatment and might have to get fluids in the ER if not staying hydrated. Encouraged pt to work on hydration as well as intake     Wanted to review foods she should/should not be eating. Wants to know what starchy vegetables are.    Reviewed dietary " recommendations/guidelines   - Pt recalls protein minimum   - Pt recalls  fluids from 30 min before/after but has not been doing  - Sugar/fat - pt did not remember, informed her of recommendations     Mailed following handouts:   Regular diet, protein sources, meal ideas, site with bariatric plates/utensils, dietary guidelines after bariatric surgery    Sept 13th   No major changes since last appt per pt.   Still struggling with appetite. Will notice an increase in appetite after treatment (infusions)    Some days (1-2x/week) will get super hungry and then eat fast. Taking another bite before first bite is even gone.    Recent recall  Protein shake in am  Chicken salad for lunch (forced self to eat) - got about 5-6 bites. Using baby spoon  Protein shake for dinner   Fluids: doing 64 oz/day,  from lunch time meal    Oct 13th   No major changes - continues with little to no appetite.   Pt thinks this may be due to her psych meds, recent increase in dose. Pt plans to discuss further with Brian at her appt on 11/29.     Common foods consumed: eggs, salmon,chicken, canned vegetables (corn, broccoli)    Recent recall:  Eating 2 meals/day on average. Having a protein shake if not having a meal ([employer] brand - high performance with 30 g protein, 1 g sugar)   Breakfast: eggs or protein shake  Lunch: sandwich - with turkey or chicken lunch meat, sometimes cheese, alonso and bread.   Dinner: 2 chicken drummies,1/2 scoop macaroni, 1/2 scoop green beans  *Using baby utensils/plates     Tolerating bread okay per pt. Discussed how toasting it or having an open face sandwich may be better tolerated since bread expands and is not tolerated as well after bariatric surgery.      Nutrition Prescription:  Grams Protein: 50-60 (minimum)  Amount of Fluid: 48-64 oz    Nutrition Diagnosis   Food and nutrition-related knowledge deficit r/t limited prior exposure to maintenance diet guidelines after bariatric surgery aeb  pt unable to verbalize full understanding of maintenance diet guidelines post bariatric surgery.      Intervention  Mailed following handouts:   Regular diet, protein sources, meal ideas, site with bariatric plats/utensils, dietary guidelines after bariatric surgery  Assessed current dietary habits  Nutrition Education on Dietary recommendations   Coordination of care - AVS via mail  Answered pt questions     Goals:  1) Continue bariatric regular diet as tolerated.     2) Aim for 3 meals per day.   - Continue with protein shake if not able to eat a meal  - Consider having bread toasted or doing open face sandwich    3)Consume 60 grams of protein/day.    - Try to protein foods first.     4) Sip on 64-96 oz of fluids/day- between meals only (do not drink 30 mins before or after meal or while you're eating)    4) Eat slowly (>20 min/meal), chewing foods well (to applesauce-like consistency).    5) Take supplements as prescribed. Set up appointment to get B12 injection for October and November.     6) New scale    7) Talk with Dr. Torres about medications and appetite.     Diet Guidelines after Weight-loss Surgery  [email or web address]     Regular Foods/Meal Ideas  [email or web address]     Protein Sources   [email or web address]      Follow-Up:  Tues, November 30th at 1:00 pm     Time spent with pt: 19 mins  SIL Kate, RD, LD

## 2021-10-13 NOTE — PATIENT INSTRUCTIONS
Goals:  1) Continue bariatric regular diet as tolerated.     2) Aim for 3 meals per day.   - Continue with protein shake if not able to eat a meal  - Consider having bread toasted or doing open face sandwich    3)Consume 60 grams of protein/day.    - Try to protein foods first.     4) Sip on 64-96 oz of fluids/day- between meals only (do not drink 30 mins before or after meal or while you're eating)    4) Eat slowly (>20 min/meal), chewing foods well (to applesauce-like consistency).    5) Take supplements as prescribed. Set up appointment to get B12 injection for October and November.     6) New scale    7) Talk with Dr. Torres about medications and appetite.     Diet Guidelines after Weight-loss Surgery  [email or web address]     Regular Foods/Meal Ideas  [email or web address]     Protein Sources   [email or web address]      Follow-Up:  Tuesday, November 30th at 1:00 pm     SIL Kate, RD, LD

## 2021-10-13 NOTE — PROGRESS NOTES
"Hilaria Bonner is a 30 year old female who is being evaluated via a billable telephone visit.      The patient has been notified of following:     \"This telephone visit will be conducted via a call between you and your physician/provider. We have found that certain health care needs can be provided without the need for a physical exam.  This service lets us provide the care you need with a short phone conversation.  If a prescription is necessary we can send it directly to your pharmacy.  If lab work is needed we can place an order for that and you can then stop by our lab to have the test done at a later time.    Telephone visits are billed at different rates depending on your insurance coverage. During this emergency period, for some insurers they may be billed the same as an in-person visit.  Please reach out to your insurance provider with any questions.    If during the course of the call the physician/provider feels a telephone visit is not appropriate, you will not be charged for this service.\"    Patient has given verbal consent for Telephone visit?  Yes    What phone number would you like to be contacted at? [phone]    How would you like to obtain your AVS? MyChart    Phone call duration: 19 min    During this virtual visit the patient is located in [place], patient verifies this as the location during the entirety of this visit.       Reason For Visit:  Hilaria Bonner is a 30 year old female, completed a virtual visit today for nutrition follow-up s/p SG with Dr Lopez (3/26/19).  Patient referred by Joanne TOVAR.      Anthropometrics  Initial Consult Weight: 315.4 lbs  Day of Surgery Weight(3/26/19): 291.2 lbs    Current Weight:   Estimated body mass index is 45 kg/m  as calculated from the following:    Height as of 8/13/21: 1.695 m (5' 6.75\").    Weight as of 8/13/21: 129.4 kg (285 lb 3.2 oz).     Current weight: Unknown - scale broken. Pt wants to buy a new one this month.    Current " Vitamins/Minerals:  MVI with iron 2x/day   Calcium Citrate - not taking. Has been taking Vit C instead. Got them mixed up per pt. However, given pt's hx it may be possible she is taking this to help with iron absorption given blood status.   Vitamin D3 (2000 international unit(s))  B-50 complex    Oct 13th - reports she is taking chewable Ca (not sure dose), Vit D3, MVI with iron, B-50 complex 1x/day, folate 1x/day, potassium chloride 2x/day, B12 injection (needs to schedule - had last on 9/3/21)  - *RD forgot to check in on Vit C    Labs  BMP done 8/13  - electrolytes WNL    B6 done 6/11/21 - normal     A1C 3/2/21 - normal at 5.0, was 5.7 3 months prior to that      Mg 2/7/21 - normal     Vit D, Folate, Cu, Vit E, Vit B1, B12, B6 1/13/21 - all normal besides Cu slightly high, B1 slightly high, B6 slightly high    Ferritin 12/17/20 - normal    Lipids 12/17/20 - chol 200, LDL calculated 112    Vit A 12/17/20 - normal    Nutrition History:     August 11 2021  Pt has not seen RD since 12/16/2020    No appetite for ~ 2 weeks, feeling full off of very little amounts    Recall:  Shrimp fried rice   Fluids: water (5-6 bottles)  Does not recall if she ate anything else    Has been trying to cut back on fluids, reports last RD told her she was drinking way too much . Was drinking around  oz. Reports she tries to stay hydrated for treatment and might have to get fluids in the ER if not staying hydrated. Encouraged pt to work on hydration as well as intake     Wanted to review foods she should/should not be eating. Wants to know what starchy vegetables are.    Reviewed dietary recommendations/guidelines   - Pt recalls protein minimum   - Pt recalls  fluids from 30 min before/after but has not been doing  - Sugar/fat - pt did not remember, informed her of recommendations     Mailed following handouts:   Regular diet, protein sources, meal ideas, site with bariatric plates/utensils, dietary guidelines after  bariatric surgery    Sept 13th   No major changes since last appt per pt.   Still struggling with appetite. Will notice an increase in appetite after treatment (infusions)    Some days (1-2x/week) will get super hungry and then eat fast. Taking another bite before first bite is even gone.    Recent recall  Protein shake in am  Chicken salad for lunch (forced self to eat) - got about 5-6 bites. Using baby spoon  Protein shake for dinner   Fluids: doing 64 oz/day,  from lunch time meal    Oct 13th   No major changes - continues with little to no appetite.   Pt thinks this may be due to her psych meds, recent increase in dose. Pt plans to discuss further with Brian at her appt on 11/29.     Common foods consumed: eggs, salmon,chicken, canned vegetables (corn, broccoli)    Recent recall:  Eating 2 meals/day on average. Having a protein shake if not having a meal ([employer] brand - high performance with 30 g protein, 1 g sugar)   Breakfast: eggs or protein shake  Lunch: sandwich - with turkey or chicken lunch meat, sometimes cheese, alonso and bread.   Dinner: 2 chicken drummies,1/2 scoop macaroni, 1/2 scoop green beans  *Using baby utensils/plates     Tolerating bread okay per pt. Discussed how toasting it or having an open face sandwich may be better tolerated since bread expands and is not tolerated as well after bariatric surgery.      Nutrition Prescription:  Grams Protein: 50-60 (minimum)  Amount of Fluid: 48-64 oz    Nutrition Diagnosis   Food and nutrition-related knowledge deficit r/t limited prior exposure to maintenance diet guidelines after bariatric surgery aeb pt unable to verbalize full understanding of maintenance diet guidelines post bariatric surgery.      Intervention  Mailed following handouts:   Regular diet, protein sources, meal ideas, site with bariatric plats/utensils, dietary guidelines after bariatric surgery  Assessed current dietary habits  Nutrition Education on Dietary  recommendations   Coordination of care - AVS via mail  Answered pt questions     Goals:  1) Continue bariatric regular diet as tolerated.     2) Aim for 3 meals per day.   - Continue with protein shake if not able to eat a meal  - Consider having bread toasted or doing open face sandwich    3)Consume 60 grams of protein/day.    - Try to protein foods first.     4) Sip on 64-96 oz of fluids/day- between meals only (do not drink 30 mins before or after meal or while you're eating)    4) Eat slowly (>20 min/meal), chewing foods well (to applesauce-like consistency).    5) Take supplements as prescribed. Set up appointment to get B12 injection for October and November.     6) New scale    7) Talk with Dr. Torres about medications and appetite.     Diet Guidelines after Weight-loss Surgery  [email or web address]     Regular Foods/Meal Ideas  [email or web address]     Protein Sources   [email or web address]      Follow-Up:  Tues, November 30th at 1:00 pm     Time spent with pt: 19 mins  SIL Kate, RD, LD

## 2021-10-15 ENCOUNTER — PATIENT OUTREACH (OUTPATIENT)
Dept: CARE COORDINATION | Facility: CLINIC | Age: 31
End: 2021-10-15

## 2021-10-15 NOTE — PROGRESS NOTES
"Clinic Care Coordination Contact  [facility]/Voicemail       Clinical Data: Care Coordinator Outreach  Outreach attempted x 1.  Left message on patient's voicemail with call back information and requested return call. Waited 30 minutes before listing patient as \"no show\"  Plan: CHW will contact patient and reschedule phone assessment for another day and time    IVET Bowens  Primary Care Clinic- Social Work Care Coordinator  [facility]  Ph: [phone]  10/15/2021 12:18 PM      "

## 2021-10-18 ENCOUNTER — PATIENT OUTREACH (OUTPATIENT)
Dept: NURSING | Facility: CLINIC | Age: 31
End: 2021-10-18

## 2021-10-18 NOTE — PROGRESS NOTES
Clinic Care Coordination Contact  Community Health Worker Initial Outreach     CHW Initial Information Gathering:  Referral Source: Behavioral Health Clinician  Preferred Hospital:  (none)  Preferred Urgent Care:  (None)  Current living arrangement:: I live in a private home with family  Type of residence:: Apartment  Community Resources: [facility], Financial/Insurance  Supplies used at home:: Compression Stockings  Equipment Currently Used at Home: walker, standard  Informal Support system:: Children, Parent, Family  Transportation means:: Family, Friend, Medical transport  CHW Additional Questions  If ED/Hospital discharge, follow-up appointment scheduled as recommended?: N/A  Medication changes made following ED/Hospital discharge?: N/A  MyChart active?: Yes  Patient sent Social Determinants of Health questionnaire?: Yes     Patient accepts CC: Yes. Patient scheduled for assessment with CC RUBÉN on 10/15/21 at 11:00 am . Patient noted desire to discuss help with housing and financial.

## 2021-10-19 ENCOUNTER — PATIENT OUTREACH (OUTPATIENT)
Dept: NURSING | Facility: CLINIC | Age: 31
End: 2021-10-19
Payer: COMMERCIAL

## 2021-10-19 DIAGNOSIS — F29 PSYCHOSIS, UNSPECIFIED PSYCHOSIS TYPE (H): ICD-10-CM

## 2021-10-19 DIAGNOSIS — F32.1 MODERATE MAJOR DEPRESSION (H): ICD-10-CM

## 2021-10-19 DIAGNOSIS — F41.1 GAD (GENERALIZED ANXIETY DISORDER): ICD-10-CM

## 2021-10-19 PROBLEM — F32.9 MAJOR DEPRESSION: Chronic | Status: ACTIVE | Noted: 2021-02-11

## 2021-10-19 SDOH — ECONOMIC STABILITY: TRANSPORTATION INSECURITY
IN THE PAST 12 MONTHS, HAS LACK OF TRANSPORTATION KEPT YOU FROM MEETINGS, WORK, OR FROM GETTING THINGS NEEDED FOR DAILY LIVING?: NO

## 2021-10-19 SDOH — ECONOMIC STABILITY: FOOD INSECURITY: WITHIN THE PAST 12 MONTHS, YOU WORRIED THAT YOUR FOOD WOULD RUN OUT BEFORE YOU GOT MONEY TO BUY MORE.: SOMETIMES TRUE

## 2021-10-19 SDOH — ECONOMIC STABILITY: FOOD INSECURITY: WITHIN THE PAST 12 MONTHS, THE FOOD YOU BOUGHT JUST DIDN'T LAST AND YOU DIDN'T HAVE MONEY TO GET MORE.: SOMETIMES TRUE

## 2021-10-19 SDOH — ECONOMIC STABILITY: TRANSPORTATION INSECURITY
IN THE PAST 12 MONTHS, HAS THE LACK OF TRANSPORTATION KEPT YOU FROM MEDICAL APPOINTMENTS OR FROM GETTING MEDICATIONS?: NO

## 2021-10-19 ASSESSMENT — SOCIAL DETERMINANTS OF HEALTH (SDOH): HOW HARD IS IT FOR YOU TO PAY FOR THE VERY BASICS LIKE FOOD, HOUSING, MEDICAL CARE, AND HEATING?: NOT VERY HARD

## 2021-10-19 NOTE — LETTER
[facility]  Patient Centered Plan of Care  About Me:        Patient Name:  Hilaria Bonner    YOB: 1990  Age:         30 year old   Gabriel MRN:    [id number] Telephone Information:  Home Phone [phone]   Mobile [phone]       Address:  [address]  [address]  [facility] [address] Email address:  [email or web address]      Emergency Contact(s)    Name Relationship Lgl Grd Work Phone Home Phone Mobile Phone   1. MANJULA BONNER Mother   [phone]           Primary language:  English     needed? No   [place] [employer] [facility]:  [phone] op. 1  Other communication barriers:Physical impairment    Preferred Method of Communication:  Ferdinand  Current living arrangement: I live in a private home with family    Mobility Status/ Medical Equipment: Independent w/Device        Health Maintenance  Health Maintenance Reviewed: Due/Overdue   Health Maintenance Due   Topic Date Due     ADVANCE CARE PLANNING  Never done     Pneumococcal Vaccine: Pediatrics (0 to 5 Years) and At-Risk Patients (6 to 64 Years) (1 of 2 - PPSV23) Never done     COVID-19 Vaccine (1) Never done     HEPATITIS C SCREENING  Never done     EYE EXAM  01/04/2019     URINE DRUG SCREEN  08/20/2021     INFLUENZA VACCINE (1) 09/01/2021     PREVENTIVE CARE VISIT  09/30/2021           My Access Plan  Medical Emergency 911   Primary Clinic Line [facility] [phone]   24 Hour Appointment Line [phone] or  [phone]) (toll-free)   24 Hour Nurse Line [phone] (toll-free)   Preferred Urgent Care [facility] [phone] (None)     Preferred Hospital [facility]  [phone]     Preferred Pharmacy [id number] - [place], [place] - [address] AT [address] [facility]     Behavioral Health Crisis Line The National Suicide  Prevention Lifeline at 1-778.417.8137 or 911             My Care Team Members  Patient Care Team       Relationship Specialty Notifications Start End    Crissy Madrigal PA-C PCP - General Family Practice  10/10/19     Phone: [phone] Fax: [phone] [address] [facility] [address] [facility] [address]    Joanne Sterling PA-C Physician Assistant Physician Assistant  1/16/18     Phone: [phone] Fax: [phone]         [address] [place] [address] [address] [facility] [address]    Alison Sevilla PA-C Physician Assistant Physician Assistant  1/16/18     Phone: [phone] Fax: [phone]         [facility] [address] [facility] [address]    Momo Sher MD MD Neurology  7/27/20     Phone: [phone] Fax: [phone]         [address] [place] [address] [address] [facility] [address]    Travon Chua MD MD Neurology  7/27/20     Phone: [phone] Fax: [phone]         [address] [facility] [address]    Yelena Jones MD Assigned Heart and Vascular Provider   10/23/20     Phone: [phone] Pager: [phone] Fax: [phone]        [address] [facility] [address]    Travon Chua MD Assigned Neuroscience Provider   10/23/20     Phone: [phone] Fax: [phone]         [address] [facility] [address]    Evgeny Cheatham MD Assigned OBGYN Provider   10/23/20     Phone: [phone] Fax: [phone] [address] [facility] [address]    Joseph Kinney MD Assigned Endocrinology Provider   11/22/20     Phone: [phone] Pager: [phone] Fax: [phone]        [address] [facility] [address]    Crissy Madrigal PA-C Assigned PCP   3/14/21     Phone: [phone] Fax: [phone] [address] [facility] [address]    Joanne Sterling PA-C Assigned Surgical Provider   3/17/21     Phone: [phone] Fax: [phone]         [address] [address]    Ariana Matute MD Assigned  Behavioral Health Provider   3/28/21     Phone: [phone] Fax: [phone]         [address] [facility] [address] [place] [address]    Albino Pratt BSW Lead Care Coordinator Primary Care - CC Admissions 10/19/21     Alayna Olsen MA Community Health Worker Primary Care - CC Admissions 10/19/21             My Care Plans  Self Management and Treatment Plan  Goals and (Comments)  Goals        General     Psychosocial (pt-stated)      Notes - Note edited  10/19/2021 12:42 PM by Albino Pratt BSW     Goal Statement: I would like to move  Date Goal set: 10/19/2021  Barriers: Unsure what disability payments will be each month; Wants a three bedroom townhome or house to rent instead of an apartment- not sure if this will be financially feasible  Strengths: Patient is a great self advocate; Patient is enrolled in Care Coordination   Date to Achieve By: 2/2022  Patient expressed understanding of goal: Yes  Action steps to achieve this goal:  1. I will wait to see what my disability payments will be  2. I will review living options within my price range on [employer] once I get my monthly disability payments figured out  3. I will tour living options that are suitable for me and my children  4. I will move in to a new place.               Action Plans on File:                       Advance Care Plans/Directives Type:   No data recorded    My Medical and Care Information  Problem List   Patient Active Problem List   Diagnosis     Vitamin D deficiency     Elevated parathyroid hormone     Encounter for smoking cessation counseling     Menorrhagia with irregular cycle     Pulmonary embolism with infarction (H)     Cardiac arrest, cause unspecified (H)     VT (ventricular tachycardia) (H)     Secondary hyperparathyroidism, not elsewhere classified (H)     Paresthesias     Hemorrhoids, unspecified hemorrhoid type     Altered mental status     Chronic inflammatory demyelinating polyneuropathy (H)     S/P laparoscopic sleeve  gastrectomy     Morbid obesity (H)     Moderate major depression (H)     Alcohol abuse     Cognitive and neurobehavioral dysfunction following brain injury (H)     Psychosis (H)     MELODY (generalized anxiety disorder)     Acute massive pulmonary embolism (H)     Medical cannabis use     Sleep disturbances     Tobacco use     Compression fracture of T9 vertebra with routine healing, subsequent encounter     HTN (hypertension)      Current Medications and Allergies:  See printed Medication Report.    Care Coordination Start Date: 10/19/2021   Frequency of Care Coordination: monthly     Form Last Updated: 10/19/2021

## 2021-10-19 NOTE — LETTER
[facility] CARE COORDINATION  [address]  [place] [facility] [address]    October 19, 2021    Hilaria Bonner  [address]  [address]  [place] [facility] [address]      Dear Hilaria,    I am a clinic care coordinator who works with Crissy Madrigal PA-C at [facility]. I wanted to thank you for spending the time to talk with me.  Below is a description of clinic care coordination and how I can further assist you.      The clinic care coordination team is made up of a registered nurse,  and community health worker who understand the health care system. The goal of clinic care coordination is to help you manage your health and improve access to the health care system in the most efficient manner. The team can assist you in meeting your health care goals by providing education, coordinating services, strengthening the communication among your providers and supporting you with any resource needs.    Please feel free to contact the Community Health Worker Alayna at [phone] with any questions or concerns. We are focused on providing you with the highest-quality healthcare experience possible and that all starts with you.     Sincerely,     Albino Pratt, [facility]  Primary Care Clinic- Social Work Care Coordinator  [facility]- [place], [place] and Jaclyn López  Ph: [phone]    Enclosed: I have enclosed a copy of the Patient Centered Plan of Care. This has helpful information and goals that we have talked about. Please keep this in an easy to access place to use as needed.

## 2021-10-20 ENCOUNTER — LAB (OUTPATIENT)
Dept: LAB | Facility: CLINIC | Age: 31
End: 2021-10-20
Payer: COMMERCIAL

## 2021-10-20 ENCOUNTER — TELEPHONE (OUTPATIENT)
Dept: FAMILY MEDICINE | Facility: CLINIC | Age: 31
End: 2021-10-20

## 2021-10-20 DIAGNOSIS — R06.89 DYSPNEA AND RESPIRATORY ABNORMALITY: ICD-10-CM

## 2021-10-20 DIAGNOSIS — R63.1 INCREASED THIRST: ICD-10-CM

## 2021-10-20 DIAGNOSIS — R06.00 DYSPNEA AND RESPIRATORY ABNORMALITY: ICD-10-CM

## 2021-10-20 DIAGNOSIS — Z13.1 SCREENING FOR DIABETES MELLITUS: ICD-10-CM

## 2021-10-20 DIAGNOSIS — G61.81 CHRONIC INFLAMMATORY DEMYELINATING POLYNEUROPATHY (H): Chronic | ICD-10-CM

## 2021-10-20 LAB
ANION GAP SERPL CALCULATED.3IONS-SCNC: 5 MMOL/L (ref 3–14)
BASE EXCESS BLDV CALC-SCNC: 2 MMOL/L (ref -8.1–1.9)
BUN SERPL-MCNC: 5 MG/DL (ref 7–30)
CALCIUM SERPL-MCNC: 8.9 MG/DL (ref 8.5–10.1)
CHLORIDE BLD-SCNC: 108 MMOL/L (ref 94–109)
CO2 BLD-SCNC: 29 MMOL/L (ref 20–32)
CO2 SERPL-SCNC: 27 MMOL/L (ref 20–32)
CREAT SERPL-MCNC: 0.82 MG/DL (ref 0.52–1.04)
GFR SERPL CREATININE-BSD FRML MDRD: >90 ML/MIN/1.73M2
GLUCOSE BLD-MCNC: 140 MG/DL (ref 70–99)
HBA1C MFR BLD: 5.6 % (ref 0–5.6)
HCO3 BLDV-SCNC: 28 MMOL/L (ref 21–28)
PCO2 BLDV: 52 MM HG (ref 40–50)
PH BLDV: 7.34 [PH] (ref 7.32–7.43)
PO2 BLDV: 18 MM HG (ref 25–47)
POTASSIUM BLD-SCNC: 4.1 MMOL/L (ref 3.4–5.3)
SAO2 % BLDV: 23 % (ref 94–100)
SODIUM SERPL-SCNC: 140 MMOL/L (ref 133–144)

## 2021-10-20 PROCEDURE — 83036 HEMOGLOBIN GLYCOSYLATED A1C: CPT

## 2021-10-20 PROCEDURE — 36415 COLL VENOUS BLD VENIPUNCTURE: CPT

## 2021-10-20 PROCEDURE — 82803 BLOOD GASES ANY COMBINATION: CPT

## 2021-10-20 PROCEDURE — 80048 BASIC METABOLIC PNL TOTAL CA: CPT

## 2021-10-20 NOTE — TELEPHONE ENCOUNTER
Patient lost parking form that was filled out on 09/15/2021. Patient completed what she needed on a new form.     Regine MCKINNON

## 2021-10-21 DIAGNOSIS — R73.9 ELEVATED BLOOD SUGAR: Primary | ICD-10-CM

## 2021-10-21 NOTE — RESULT ENCOUNTER NOTE
Please call and find out if patient was fasting for blood work. Please repeat another  fasting basic metabolic panel at her convenience to recheck glucose   A1C was normal but blood sugar high.      Also sent [employer] message

## 2021-10-22 ENCOUNTER — LAB (OUTPATIENT)
Dept: LAB | Facility: CLINIC | Age: 31
End: 2021-10-22
Payer: COMMERCIAL

## 2021-10-22 ENCOUNTER — THERAPY VISIT (OUTPATIENT)
Dept: SLEEP MEDICINE | Facility: CLINIC | Age: 31
End: 2021-10-22
Attending: PHYSICIAN ASSISTANT
Payer: COMMERCIAL

## 2021-10-22 DIAGNOSIS — Z72.820 LACK OF ADEQUATE SLEEP: ICD-10-CM

## 2021-10-22 DIAGNOSIS — I10 ESSENTIAL HYPERTENSION: ICD-10-CM

## 2021-10-22 DIAGNOSIS — R06.83 SNORING: ICD-10-CM

## 2021-10-22 DIAGNOSIS — R53.81 MALAISE AND FATIGUE: ICD-10-CM

## 2021-10-22 DIAGNOSIS — E11.9 TYPE 2 DIABETES MELLITUS WITHOUT COMPLICATION, WITHOUT LONG-TERM CURRENT USE OF INSULIN (H): ICD-10-CM

## 2021-10-22 DIAGNOSIS — G61.81 CHRONIC INFLAMMATORY DEMYELINATING POLYNEUROPATHY (H): Chronic | ICD-10-CM

## 2021-10-22 DIAGNOSIS — R06.89 DYSPNEA AND RESPIRATORY ABNORMALITY: ICD-10-CM

## 2021-10-22 DIAGNOSIS — E66.813 CLASS 3 SEVERE OBESITY DUE TO EXCESS CALORIES WITH BODY MASS INDEX (BMI) OF 40.0 TO 44.9 IN ADULT, UNSPECIFIED WHETHER SERIOUS COMORBIDITY PRESENT (H): ICD-10-CM

## 2021-10-22 DIAGNOSIS — R53.83 MALAISE AND FATIGUE: ICD-10-CM

## 2021-10-22 DIAGNOSIS — R06.00 DYSPNEA AND RESPIRATORY ABNORMALITY: ICD-10-CM

## 2021-10-22 DIAGNOSIS — R73.9 ELEVATED BLOOD SUGAR: ICD-10-CM

## 2021-10-22 DIAGNOSIS — G47.30 SLEEP APNEA, UNSPECIFIED TYPE: ICD-10-CM

## 2021-10-22 DIAGNOSIS — Z13.220 SCREENING FOR HYPERLIPIDEMIA: ICD-10-CM

## 2021-10-22 DIAGNOSIS — E66.01 CLASS 3 SEVERE OBESITY DUE TO EXCESS CALORIES WITH BODY MASS INDEX (BMI) OF 40.0 TO 44.9 IN ADULT, UNSPECIFIED WHETHER SERIOUS COMORBIDITY PRESENT (H): ICD-10-CM

## 2021-10-22 LAB
ANION GAP SERPL CALCULATED.3IONS-SCNC: 4 MMOL/L (ref 3–14)
BUN SERPL-MCNC: 5 MG/DL (ref 7–30)
CALCIUM SERPL-MCNC: 9 MG/DL (ref 8.5–10.1)
CHLORIDE BLD-SCNC: 112 MMOL/L (ref 94–109)
CO2 SERPL-SCNC: 27 MMOL/L (ref 20–32)
CREAT SERPL-MCNC: 0.88 MG/DL (ref 0.52–1.04)
GFR SERPL CREATININE-BSD FRML MDRD: 88 ML/MIN/1.73M2
GLUCOSE BLD-MCNC: 136 MG/DL (ref 70–99)
POTASSIUM BLD-SCNC: 3.8 MMOL/L (ref 3.4–5.3)
SODIUM SERPL-SCNC: 143 MMOL/L (ref 133–144)

## 2021-10-22 PROCEDURE — 84460 ALANINE AMINO (ALT) (SGPT): CPT

## 2021-10-22 PROCEDURE — 84450 TRANSFERASE (AST) (SGOT): CPT

## 2021-10-22 PROCEDURE — 80048 BASIC METABOLIC PNL TOTAL CA: CPT

## 2021-10-22 PROCEDURE — 80061 LIPID PANEL: CPT

## 2021-10-22 PROCEDURE — 95810 POLYSOM 6/> YRS 4/> PARAM: CPT | Performed by: INTERNAL MEDICINE

## 2021-10-22 PROCEDURE — 36415 COLL VENOUS BLD VENIPUNCTURE: CPT

## 2021-10-22 NOTE — RESULT ENCOUNTER NOTE
Based on blood sugar readings patient - has diabetes- schedule face to face visit with me at earliest convenience.

## 2021-10-23 NOTE — PATIENT INSTRUCTIONS
[place] [facility]    1. Your sleep study will be reviewed by a sleep physician within the next few days.     2. Please follow up in the sleep clinic as scheduled, or, make an appointment with your sleep provider to be seen within two weeks to discuss the results of the sleep study.    3. If you have any questions or problems with your treatment plan, please contact your sleep clinic provider at [phone] to further manage your condition.    4. Please review your attached medication list, and, at your follow-up appointment advise your sleep clinic provider about any changes.    5. Go to [email or web address] for more information about your sleep problems.    Parish Coronado, TONOGT  October 23, 2021

## 2021-10-25 ENCOUNTER — TELEPHONE (OUTPATIENT)
Dept: FAMILY MEDICINE | Facility: CLINIC | Age: 31
End: 2021-10-25

## 2021-10-25 ENCOUNTER — MYC MEDICAL ADVICE (OUTPATIENT)
Dept: PEDIATRICS | Facility: CLINIC | Age: 31
End: 2021-10-25

## 2021-10-25 DIAGNOSIS — E11.9 TYPE 2 DIABETES MELLITUS WITHOUT COMPLICATION, WITHOUT LONG-TERM CURRENT USE OF INSULIN (H): Primary | ICD-10-CM

## 2021-10-25 DIAGNOSIS — R79.89 ELEVATED LFTS: Primary | ICD-10-CM

## 2021-10-25 DIAGNOSIS — E11.9 TYPE 2 DIABETES MELLITUS WITHOUT COMPLICATION, WITHOUT LONG-TERM CURRENT USE OF INSULIN (H): ICD-10-CM

## 2021-10-25 DIAGNOSIS — Z13.220 SCREENING FOR HYPERLIPIDEMIA: ICD-10-CM

## 2021-10-25 DIAGNOSIS — R73.9 BLOOD GLUCOSE ELEVATED: Primary | ICD-10-CM

## 2021-10-25 LAB
ALT SERPL W P-5'-P-CCNC: 62 U/L (ref 0–50)
AST SERPL W P-5'-P-CCNC: 71 U/L (ref 0–45)
CHOLEST SERPL-MCNC: 196 MG/DL
HDLC SERPL-MCNC: 47 MG/DL
LDLC SERPL CALC-MCNC: 118 MG/DL
NONHDLC SERPL-MCNC: 149 MG/DL
TRIGL SERPL-MCNC: 155 MG/DL

## 2021-10-25 NOTE — TELEPHONE ENCOUNTER
Writer contacted the patient and informed on the message below.  Diabetes Educator number given.    Jaclyn Alvarez RN

## 2021-10-25 NOTE — TELEPHONE ENCOUNTER
Reason for Call:  Other appointment and call back    Detailed comments: Called to set up appointment for diabetes due to the lab results and phone conversation with nurse.  Was not able to get in with primary until 11/16. Wondering if it is ok to wait that long and if so what cn she do in the meantime about her blood sugars, Please call or [employer] message to advise.  Thank you     Phone Number Patient can be reached at: Home number on file [phone] (home)    Best Time: Any    Can we leave a detailed message on this number? YES    Call taken on 10/25/2021 at 12:00 PM by Arron Black

## 2021-10-25 NOTE — TELEPHONE ENCOUNTER
Ok to wait till 11/16/21 for visit but should schedule appointment with diabetes education as soon as possible   Work on diet - should meet with diabetes educator

## 2021-10-25 NOTE — RESULT ENCOUNTER NOTE
Please call and advise that liver function tests were abnormal.  Schedule nonfasting labs and abdomen ultrasound at [facility] ([phone]) formerly called [facility] at earliest convenience.     Also sent MyChart message  Dear Marti  Your cholesterol was a bit higher than we like for anyone with diabetes.  Your liver function tests were abnormal.  Please schedule a nonfasting lab only to check your liver tests and make sure you don't have hepatitis.  Please also schedule an abdominal ultrasound at [facility] ([phone]) formerly called [facility].   Please call or MyChart my office with any questions or concerns.   Crissy Madrigal, PAC

## 2021-10-25 NOTE — TELEPHONE ENCOUNTER
Please call and advise that liver function tests were abnormal.  Schedule nonfasting labs and abdomen ultrasound at [facility] ([phone]) formerly called [facility] at earliest convenience.      Also sent [employer] message    Dear Marti  Your cholesterol was a bit higher than we like for anyone with diabetes.  Your liver function tests were abnormal.  Please schedule a nonfasting lab only to check your liver tests and make sure you don't have hepatitis.  Please also schedule an abdominal ultrasound at [facility] ([phone]) formerly called [facility].   Please call or MyChart my office with any questions or concerns.   Crissy Madrigal, PAC    Patient updated on the above, no questions at this time, verbalized understanding.    Summer Terrazas RN

## 2021-10-26 ENCOUNTER — THERAPY VISIT (OUTPATIENT)
Dept: PHYSICAL THERAPY | Facility: CLINIC | Age: 31
End: 2021-10-26
Payer: COMMERCIAL

## 2021-10-26 ENCOUNTER — HOME INFUSION (PRE-WILLOW HOME INFUSION) (OUTPATIENT)
Dept: PHARMACY | Facility: CLINIC | Age: 31
End: 2021-10-26

## 2021-10-26 DIAGNOSIS — M54.6 BILATERAL THORACIC BACK PAIN, UNSPECIFIED CHRONICITY: Primary | ICD-10-CM

## 2021-10-26 DIAGNOSIS — M54.6 ACUTE THORACIC BACK PAIN, UNSPECIFIED BACK PAIN LATERALITY: ICD-10-CM

## 2021-10-26 PROBLEM — F29 PSYCHOSIS (H): Status: RESOLVED | Noted: 2021-04-22 | Resolved: 2021-10-26

## 2021-10-26 PROBLEM — G47.33 OSA (OBSTRUCTIVE SLEEP APNEA): Chronic | Status: ACTIVE | Noted: 2021-10-26

## 2021-10-26 PROBLEM — I26.99 ACUTE MASSIVE PULMONARY EMBOLISM (H): Status: RESOLVED | Noted: 2019-05-13 | Resolved: 2021-10-26

## 2021-10-26 PROBLEM — I26.99 PULMONARY EMBOLISM WITH INFARCTION (H): Status: RESOLVED | Noted: 2019-05-30 | Resolved: 2021-10-26

## 2021-10-26 PROBLEM — R41.82 ALTERED MENTAL STATUS: Status: RESOLVED | Noted: 2020-06-25 | Resolved: 2021-10-26

## 2021-10-26 PROBLEM — Z86.711 HISTORY OF PULMONARY EMBOLISM: Chronic | Status: ACTIVE | Noted: 2021-10-26

## 2021-10-26 PROBLEM — I46.9 CARDIAC ARREST, CAUSE UNSPECIFIED (H): Status: RESOLVED | Noted: 2020-01-08 | Resolved: 2021-10-26

## 2021-10-26 PROCEDURE — 97110 THERAPEUTIC EXERCISES: CPT | Mod: GP | Performed by: PHYSICAL THERAPIST

## 2021-10-26 PROCEDURE — 97112 NEUROMUSCULAR REEDUCATION: CPT | Mod: GP | Performed by: PHYSICAL THERAPIST

## 2021-10-26 NOTE — TELEPHONE ENCOUNTER
Ok to take one of my same day in person slots to see her sooner than previously scheduled 11/16/21  But extremely beneficial to get scheduled with diabetes education as soon as possible  Patient has not read this [employer] message below yet

## 2021-10-26 NOTE — PROCEDURES
" SLEEP STUDY INTERPRETATION  DIAGNOSTIC POLYSOMNOGRAPHY REPORT      Patient: RADHA JOHANSEN  YOB: 1990  Study Date: 10/22/2021  MRN: [id number]  Referring Provider: JORDY Brice  Ordering Provider: JORDY Boyer    Indications for Polysomnography: The patient is a 30 year old Female who is 5' 7\" and weighs 282.0 lbs. Her BMI is 44.3, [place] sleepiness scale 12/24 and neck circumference is 40 cm. A diagnostic polysomnogram was performed to evaluate for probable obstructive sleep apnea with modest possibility of coexisting hypoventilation based on BMI of 44-45, loud snoring, witnessed apnea, non-refreshing sleep, excessive daytime sleepiness (ESS 12), difficulty maintaining sleep and co-morbid HTN.    Polysomnogram Data: A full night polysomnogram recorded the standard physiologic parameters including EEG, EOG, EMG, ECG, nasal and oral airflow. Respiratory parameters of chest and abdominal movements were recorded with respiratory inductance plethysmography. Oxygen saturation was recorded by pulse oximetry. Hypopnea scoring rule used: 1B 4%.    Sleep Architecture:   The total recording time of the polysomnogram was 430.0 minutes. The total sleep time was 366.0 minutes. Sleep latency was normal at 13.0 minutes without the use of a sleep aid. REM latency was 234.5 minutes. Arousal index was increased at 52.6 arousals per hour. Sleep efficiency was normal at 85.1%. Wake after sleep onset was 50.5 minutes. The patient spent 4.5% of total sleep time in Stage N1, 60.2% in Stage N2, 29.9% in Stage N3, and 5.3% in REM. Time in REM supine was 7.5 minutes.    Respiration:     Events ? The polysomnogram revealed a presence of 7 obstructive, 0 central, and 0 mixed apneas resulting in an apnea index of 1.1 events per hour. There were 34 obstructive hypopneas and - central hypopneas resulting in an obstructive hypopnea index of 5.6 and central hypopnea index of 0 events per hour. The " combined apnea/hypopnea index was 6.7 events per hour (central apnea/hypopnea index was 0 events per hour). The REM AHI was 27.7 events per hour. The supine AHI was 17.3 events per hour. The RERA index was 4.9 events per hour.  The RDI was 11.6 events per hour.    Snoring - was reported as moderate to loud.    Respiratory rate and pattern - was notable for normal respiratory rate and pattern.    Sustained Sleep Associated Hypoventilation - Transcutaneous carbon dioxide monitoring was used, however significant hypoventilation was not suggested with a maximum change from 32 to 36 mmHg and 0 minutes at or greater than 55 mmHg. VBG 7.34/52/18 on 10/20/21    Sleep Associated Hypoxemia - (Greater than 5 minutes O2 sat at or below 88%) was not present. Baseline oxygen saturation was 92.9%. Lowest oxygen saturation was 72.6%. Time spent less than or equal to 88% was 3.0 minutes. Time spent less than or equal to 89% was 4.2 minutes.    Movement Activity:     Periodic Limb Activity - There were 5 PLMs during the entire study. The PLM index was 0.8 movements per hour. The PLM Arousal Index was 0.3 per hour.    REM EMG Activity - Excessive transient/sustained muscle activity was not present.    Nocturnal Behavior - Abnormal sleep related behaviors were not noted     Bruxism - None apparent.      Cardiac Summary:   The average pulse rate was 100.6 bpm. The minimum pulse rate was 63.3 bpm while the maximum pulse rate was 119.1 bpm.  Arrhythmias were not noted.      Assessment:     Mild obstructive sleep apnea, predominantly TREM-related when it is severe and associated with significant desaturations in supine-REM    Severity likely to be underestimated due to the paucity of REM sleep on this study    Recommendations:    Based on the presence of mild/moderate obstructive sleep apnea and excessive daytime sleepiness, treatment could be empirically initiated with Auto?titrating PAP therapy with a range of 5 to 15 cmH2O. Recommend  clinical follow up with sleep management team.    Patient may be a candidate for dental appliance through referral to Sleep Dentistry for the treatment of obstructive sleep apnea and/or socially disruptive snoring.    If devices are not acceptable or effective, patient may benefit from evaluation of possible surgical options. If she is interested, would recommend referral to specialized ENT-Sleep provider.    Advice regarding the risks of drowsy driving.    Suggest optimizing sleep schedule and avoiding sleep deprivation.    Weight management (if BMI > 30).    Pharmacologic therapy should be used for management of restless legs syndrome only if present and clinically indicated and not based on the presence of periodic limb movements alone.    Diagnostic Codes:   Obstructive Sleep Apnea G47.33        _____________________________________   Electronically Signed By: Prabhjot Lundberg MD 10/26/21           Range(%) Time in range (min)   0.0 - 89.0 4.2   0.0 - 88.0 3.0         Stage Min(mm Hg) Max(mm Hg)   Wake 29.9 36.6   NREM(1+2+3) 32.2 37.4   REM 33.8 36.2       Range(mmHg) Time in range (min)   55.0 - 100.0 -   Excluded data <20.0 & >65.0 12.5

## 2021-10-27 ENCOUNTER — TELEPHONE (OUTPATIENT)
Dept: FAMILY MEDICINE | Facility: CLINIC | Age: 31
End: 2021-10-27

## 2021-10-27 DIAGNOSIS — Z98.84 S/P LAPAROSCOPIC SLEEVE GASTRECTOMY: ICD-10-CM

## 2021-10-27 DIAGNOSIS — S22.070A COMPRESSION FRACTURE OF T9 VERTEBRA, INITIAL ENCOUNTER (H): ICD-10-CM

## 2021-10-27 DIAGNOSIS — S22.070D COMPRESSION FRACTURE OF T9 VERTEBRA WITH ROUTINE HEALING, SUBSEQUENT ENCOUNTER: ICD-10-CM

## 2021-10-27 DIAGNOSIS — G62.9 POLYNEUROPATHY: Primary | ICD-10-CM

## 2021-10-27 LAB — SLPCOMP: NORMAL

## 2021-10-27 NOTE — TELEPHONE ENCOUNTER
Routing refill request to provider for review/approval because:  Drug not on the [facility] refill protocol   Radha Dumont BSN, RN

## 2021-10-27 NOTE — TELEPHONE ENCOUNTER
Called patient, she will keep current appt 11/16/21.     Patient is requesting a blood sugar lab be added to future orders.    Routing to provider to place orders.

## 2021-10-28 ENCOUNTER — HOME INFUSION (PRE-WILLOW HOME INFUSION) (OUTPATIENT)
Dept: PHARMACY | Facility: CLINIC | Age: 31
End: 2021-10-28

## 2021-10-28 ENCOUNTER — HOME INFUSION (PRE-WILLOW HOME INFUSION) (OUTPATIENT)
Dept: PHARMACY | Facility: CLINIC | Age: 31
End: 2021-10-28
Payer: COMMERCIAL

## 2021-10-28 ENCOUNTER — DOCUMENTATION ONLY (OUTPATIENT)
Dept: PHARMACY | Facility: CLINIC | Age: 31
End: 2021-10-28

## 2021-10-28 RX ORDER — TRAMADOL HYDROCHLORIDE 50 MG/1
50 TABLET ORAL EVERY 6 HOURS PRN
Qty: 20 TABLET | Refills: 0 | Status: SHIPPED | OUTPATIENT
Start: 2021-10-28 | End: 2021-11-23

## 2021-10-28 NOTE — PROGRESS NOTES
Skilled Nurse visit in the  patient home to administer privigen 60g in 600 ml.  No recent elevated temperature, fever, chills, productive cough, coughing for 3 weeks or longer or hemoptysis, abnormal vital signs, night sweats, chest pain. No  decrease in your appetite, unexplained weight loss or fatigue.  No other new onset medical symptoms.  Current weight 285.  PIV placed in right hand, 4 attempt/s by 2 nurses.  Pre medicated with hydrocortisone 100 mg IV, diphenhydramine 50 mg, tylenol 650 mg.  Infusion completed without complication or reaction. Pt reports therapy is effective in managing symptoms related to therapy.    Kristi Wilson RN BSN  [facility]  Email: [email or web address]  Phone: [phone]

## 2021-10-28 NOTE — TELEPHONE ENCOUNTER
[place] various providers but last refill through this office.    Last seen by pain clinic 8/31/21- no future appointment with them on the books  Needs to schedule follow up with pain clinic as soon as possible.  I have refilled tramadol but will not refill without input of pain clinic

## 2021-10-30 NOTE — PROGRESS NOTES
This is a recent snapshot of the patient's [facility] medical record.  For current drug dose and complete information and questions, call [phone] or In Basket pool, [facility] home infusion ([id number])  CSN Number:  [id number]

## 2021-11-01 RX ORDER — CALCIUM CARBONATE/VITAMIN D3 500 MG-10
TABLET,CHEWABLE ORAL
Qty: 270 TABLET | Refills: 0 | Status: SHIPPED | OUTPATIENT
Start: 2021-11-01 | End: 2021-12-20

## 2021-11-01 RX ORDER — ONDANSETRON 4 MG/1
TABLET, ORALLY DISINTEGRATING ORAL
Qty: 30 TABLET | Refills: 1 | Status: SHIPPED | OUTPATIENT
Start: 2021-11-01 | End: 2023-04-20

## 2021-11-01 NOTE — TELEPHONE ENCOUNTER
ONDANSETRON ODT 4MG TABLETS    Last Written Prescription Date:  12/14/20  Last Fill Quantity: 30,   # refills: 1   CALCIUM 500MG W/ VIT D CHW TABLETS  Last Written Prescription Date:  Na  Last Fill Quantity: ~,   # refills: ~  Last Office Visit : 8/11/21  Future Office visit:  12/1/21      Routing refill request to provider for review/approval because:  ONDANSETRON ODT 4MG TABLETS  not on the [facility] refill protocol .  Calcium 500 Mg historical.    SG with Dr Lopez (3/26/19).

## 2021-11-02 ENCOUNTER — THERAPY VISIT (OUTPATIENT)
Dept: PHYSICAL THERAPY | Facility: CLINIC | Age: 31
End: 2021-11-02
Payer: COMMERCIAL

## 2021-11-02 DIAGNOSIS — M54.6 BILATERAL THORACIC BACK PAIN, UNSPECIFIED CHRONICITY: Primary | ICD-10-CM

## 2021-11-02 DIAGNOSIS — M54.6 ACUTE THORACIC BACK PAIN, UNSPECIFIED BACK PAIN LATERALITY: ICD-10-CM

## 2021-11-02 PROCEDURE — 97110 THERAPEUTIC EXERCISES: CPT | Mod: GP | Performed by: PHYSICAL THERAPIST

## 2021-11-02 PROCEDURE — 97112 NEUROMUSCULAR REEDUCATION: CPT | Mod: GP | Performed by: PHYSICAL THERAPIST

## 2021-11-03 NOTE — PROGRESS NOTES
This is a recent snapshot of the patient's [facility] medical record.  For current drug dose and complete information and questions, call [phone] or In Basket pool, [facility] home infusion ([id number])  CSN Number:  [id number]

## 2021-11-04 ENCOUNTER — ANCILLARY PROCEDURE (OUTPATIENT)
Dept: ULTRASOUND IMAGING | Facility: CLINIC | Age: 31
End: 2021-11-04
Attending: PHYSICIAN ASSISTANT
Payer: COMMERCIAL

## 2021-11-04 ENCOUNTER — LAB (OUTPATIENT)
Dept: LAB | Facility: CLINIC | Age: 31
End: 2021-11-04
Payer: COMMERCIAL

## 2021-11-04 VITALS — HEIGHT: 67 IN | WEIGHT: 290 LBS | BODY MASS INDEX: 45.52 KG/M2

## 2021-11-04 DIAGNOSIS — R79.89 ELEVATED LFTS: ICD-10-CM

## 2021-11-04 DIAGNOSIS — R79.89 ELEVATED LFTS: Primary | ICD-10-CM

## 2021-11-04 DIAGNOSIS — E11.65 TYPE 2 DIABETES MELLITUS WITH HYPERGLYCEMIA, WITHOUT LONG-TERM CURRENT USE OF INSULIN (H): ICD-10-CM

## 2021-11-04 DIAGNOSIS — R73.9 BLOOD GLUCOSE ELEVATED: ICD-10-CM

## 2021-11-04 DIAGNOSIS — R93.5 ABNORMAL ABDOMINAL ULTRASOUND: ICD-10-CM

## 2021-11-04 DIAGNOSIS — E11.9 TYPE 2 DIABETES MELLITUS WITHOUT COMPLICATION, WITHOUT LONG-TERM CURRENT USE OF INSULIN (H): ICD-10-CM

## 2021-11-04 LAB
ALBUMIN SERPL-MCNC: 2.8 G/DL (ref 3.4–5)
ALP SERPL-CCNC: 147 U/L (ref 40–150)
ALT SERPL W P-5'-P-CCNC: 82 U/L (ref 0–50)
AST SERPL W P-5'-P-CCNC: 115 U/L (ref 0–45)
BILIRUB DIRECT SERPL-MCNC: 0.3 MG/DL (ref 0–0.2)
BILIRUB SERPL-MCNC: 0.8 MG/DL (ref 0.2–1.3)
CREAT UR-MCNC: 278 MG/DL
FASTING STATUS PATIENT QL REPORTED: YES
FERRITIN SERPL-MCNC: 66 NG/ML (ref 12–150)
GGT SERPL-CCNC: 369 U/L (ref 0–40)
GLUCOSE BLD-MCNC: 126 MG/DL (ref 70–99)
INR PPP: 1.56 (ref 0.85–1.15)
IRON SATN MFR SERPL: 33 % (ref 15–46)
IRON SERPL-MCNC: 108 UG/DL (ref 35–180)
MICROALBUMIN UR-MCNC: 118 MG/L
MICROALBUMIN/CREAT UR: 42.45 MG/G CR (ref 0–25)
PROT SERPL-MCNC: 7.8 G/DL (ref 6.8–8.8)
TIBC SERPL-MCNC: 327 UG/DL (ref 240–430)

## 2021-11-04 PROCEDURE — 87340 HEPATITIS B SURFACE AG IA: CPT

## 2021-11-04 PROCEDURE — 86708 HEPATITIS A ANTIBODY: CPT

## 2021-11-04 PROCEDURE — 82043 UR ALBUMIN QUANTITATIVE: CPT

## 2021-11-04 PROCEDURE — 76700 US EXAM ABDOM COMPLETE: CPT | Performed by: RADIOLOGY

## 2021-11-04 PROCEDURE — 82947 ASSAY GLUCOSE BLOOD QUANT: CPT

## 2021-11-04 PROCEDURE — 82728 ASSAY OF FERRITIN: CPT

## 2021-11-04 PROCEDURE — 86709 HEPATITIS A IGM ANTIBODY: CPT

## 2021-11-04 PROCEDURE — 80076 HEPATIC FUNCTION PANEL: CPT

## 2021-11-04 PROCEDURE — 86706 HEP B SURFACE ANTIBODY: CPT

## 2021-11-04 PROCEDURE — 83550 IRON BINDING TEST: CPT

## 2021-11-04 PROCEDURE — 86803 HEPATITIS C AB TEST: CPT

## 2021-11-04 PROCEDURE — 85610 PROTHROMBIN TIME: CPT

## 2021-11-04 PROCEDURE — 82977 ASSAY OF GGT: CPT

## 2021-11-04 PROCEDURE — 36415 COLL VENOUS BLD VENIPUNCTURE: CPT

## 2021-11-04 RX ORDER — EPINEPHRINE 0.3 MG/.3ML
INJECTION SUBCUTANEOUS
COMMUNITY
Start: 2021-07-09 | End: 2023-04-20

## 2021-11-04 RX ORDER — SODIUM CHLORIDE 0.9 % (FLUSH) 0.9 %
SYRINGE (ML) INJECTION
Status: ON HOLD | COMMUNITY
Start: 2021-10-27 | End: 2022-07-28

## 2021-11-04 RX ORDER — HYDROCORTISONE SODIUM SUCCINATE 100 MG/2ML
100 INJECTION, POWDER, FOR SOLUTION INTRAMUSCULAR; INTRAVENOUS
COMMUNITY
Start: 2021-10-27 | End: 2023-04-20

## 2021-11-04 ASSESSMENT — MIFFLIN-ST. JEOR: SCORE: 2068.06

## 2021-11-04 NOTE — PATIENT INSTRUCTIONS
Your BMI is Body mass index is 45.42 kg/m .  Weight management is a personal decision.  If you are interested in exploring weight loss strategies, the following discussion covers the approaches that may be successful. Body mass index (BMI) is one way to tell whether you are at a healthy weight, overweight, or obese. It measures your weight in relation to your height.  A BMI of 18.5 to 24.9 is in the healthy range. A person with a BMI of 25 to 29.9 is considered overweight, and someone with a BMI of 30 or greater is considered obese. More than two-thirds of American adults are considered overweight or obese.  Being overweight or obese increases the risk for further weight gain. Excess weight may lead to heart disease and diabetes.  Creating and following plans for healthy eating and physical activity may help you improve your health.  Weight control is part of healthy lifestyle and includes exercise, emotional health, and healthy eating habits. Careful eating habits lifelong are the mainstay of weight control. Though there are significant health benefits from weight loss, long-term weight loss with diet alone may be very difficult to achieve- studies show long-term success with dietary management in less than 10% of people. Attaining a healthy weight may be especially difficult to achieve in those with severe obesity. In some cases, medications, devices and surgical management might be considered.  What can you do?  If you are overweight or obese and are interested in methods for weight loss, you should discuss this with your provider.     Consider reducing daily calorie intake by 500 calories.     Keep a food journal.     Avoiding skipping meals, consider cutting portions instead.    Diet combined with exercise helps maintain muscle while optimizing fat loss. Strength training is particularly important for building and maintaining muscle mass. Exercise helps reduce stress, increase energy, and improves fitness.  Increasing exercise without diet control, however, may not burn enough calories to loose weight.       Start walking three days a week 10-20 minutes at a time    Work towards walking thirty minutes five days a week     Eventually, increase the speed of your walking for 1-2 minutes at time    In addition, we recommend that you review healthy lifestyles and methods for weight loss available through the National Institutes of Health patient information sites:  http://win.niddk.nih.gov/publications/index.htm    And look into health and wellness programs that may be available through your health insurance provider, employer, local community center, or graeme club.    Weight management plan: Patient was referred to their PCP to discuss a diet and exercise plan.

## 2021-11-04 NOTE — PROGRESS NOTES
This is a recent snapshot of the patient's [facility] medical record.  For current drug dose and complete information and questions, call [phone] or In Basket pool, [facility] home infusion ([id number])  CSN Number:  [id number]

## 2021-11-04 NOTE — PROGRESS NOTES
Hilaria Bonner is a 30 year old who is being evaluated via a billable video visit.      How would you like to obtain your AVS? MyChart  If the video visit is dropped, the invitation should be resent by: Text to cell phone: [phone]  Will anyone else be joining your video visit? No    Are you currently in the state of [place] Yes  Does patient have any form of state insurance?Yes   Do you have wifi? Yes  Do you have a smart phone/device?Yes  Can you download an shruti on your phone comfortably with out assistance including You Tube? Yes    If patient encounters technical issues they should call [phone] :[phone]}    Linda Jacques CMA    Video-Visit Details    Video Start Time: 10:30 AM    Type of service:  Video Visit    Video End Time:10:41 AM    Originating Location (pt. Location): Home    Distant Location (provider location):   [facility]     Platform used for Video Visit: [facility]       Sleep Study Follow-Up Visit:    Date on this visit: 11/5/2021    Hilaria Bonner comes in today for follow-up of her sleep study done on 10/22/2021 at the [facility]. A diagnostic polysomnogram was performed to evaluate for probable obstructive sleep apnea with modest possibility of coexisting hypoventilation based on BMI of 44-45, loud snoring, witnessed apnea, non-refreshing sleep, excessive daytime sleepiness (ESS 12), difficulty maintaining sleep and co-morbid HTN.    Polysomnogram as interpreted by Dr Lundbreg:  Polysomnogram Data: A full night polysomnogram recorded the standard physiologic parameters including EEG, EOG, EMG, ECG, nasal and oral airflow. Respiratory parameters of chest and abdominal movements were recorded with respiratory inductance plethysmography. Oxygen saturation was recorded by pulse oximetry. Hypopnea scoring rule used: 1B 4%.     Sleep Architecture:   The total recording time of the polysomnogram was 430.0 minutes. The total sleep time  was 366.0 minutes. Sleep latency was normal at 13.0 minutes without the use of a sleep aid. REM latency was 234.5 minutes. Arousal index was increased at 52.6 arousals per hour. Sleep efficiency was normal at 85.1%. Wake after sleep onset was 50.5 minutes. The patient spent 4.5% of total sleep time in Stage N1, 60.2% in Stage N2, 29.9% in Stage N3, and 5.3% in REM. Time in REM supine was 7.5 minutes.     Respiration:   Events ? The polysomnogram revealed a presence of 7 obstructive, 0 central, and 0 mixed apneas resulting in an apnea index of 1.1 events per hour. There were 34 obstructive hypopneas and - central hypopneas resulting in an obstructive hypopnea index of 5.6 and central hypopnea index of 0 events per hour. The combined apnea/hypopnea index was 6.7 events per hour (central apnea/hypopnea index was 0 events per hour). The REM AHI was 27.7 events per hour. The supine AHI was 17.3 events per hour. The RERA index was 4.9 events per hour.  The RDI was 11.6 events per hour.  Snoring - was reported as moderate to loud.  Respiratory rate and pattern - was notable for normal respiratory rate and pattern.  Sustained Sleep Associated Hypoventilation - Transcutaneous carbon dioxide monitoring was used, however significant hypoventilation was not suggested with a maximum change from 32 to 36 mmHg and 0 minutes at or greater than 55 mmHg. VBG 7.34/52/18 on 10/20/21  Sleep Associated Hypoxemia - (Greater than 5 minutes O2 sat at or below 88%) was not present. Baseline oxygen saturation was 92.9%. Lowest oxygen saturation was 72.6%. Time spent less than or equal to 88% was 3.0 minutes. Time spent less than or equal to 89% was 4.2 minutes.     Movement Activity:   Periodic Limb Activity - There were 5 PLMs during the entire study. The PLM index was 0.8 movements per hour. The PLM Arousal Index was 0.3 per hour.  REM EMG Activity - Excessive transient/sustained muscle activity was not present.  Nocturnal Behavior -  Abnormal sleep related behaviors were not noted   Bruxism - None apparent.     Cardiac Summary:   The average pulse rate was 100.6 bpm. The minimum pulse rate was 63.3 bpm while the maximum pulse rate was 119.1 bpm.  Arrhythmias were not noted.       Past medical/surgical history, family history, social history, medications and allergies were reviewed.      Problem List:  Patient Active Problem List    Diagnosis Date Noted     History of pulmonary embolism 10/26/2021     Priority: Medium     massive pulmonary embolism with pulseless electrical activity cardiac arrest in May 2019 following gastric bypass in April 2019       KENDALL (obstructive sleep apnea)- mild (AHI 6) 10/26/2021     Priority: Medium     10/22/2021 [facility] Sleep Study (282.0 lbs) - AHI 6.7, RDI 11.6, Supine AHI 17.3, REM AHI 27.7, Low O2 72.6%, Time Spent ?88% 3.0 minutes / Time Spent ?89% 4.2 minutes. Hypoventilation was not suggested with a maximum change from 32 to 36 mmHg and 0 minutes at or greater than 55 mmHg. VBG 7.34/52/18 on 10/20/21       HTN (hypertension) 08/03/2021     Priority: Medium     Compression fracture of T9 vertebra with routine healing, subsequent encounter 06/13/2021     Priority: Medium     Psychosis, unspecified psychosis type (H) 04/22/2021     Priority: Medium     MELODY (generalized anxiety disorder) 04/22/2021     Priority: Medium     Cognitive and neurobehavioral dysfunction following brain injury (H) 03/22/2021     Priority: Medium     Moderate major depression (H) 02/11/2021     Priority: Medium     Alcohol abuse 02/11/2021     Priority: Medium     Medical cannabis use 02/06/2021     Priority: Medium     Tobacco use 02/06/2021     Priority: Medium     S/P laparoscopic sleeve gastrectomy 01/14/2021     Priority: Medium     Sleeve gastrectomy 2018. Saddle PE and Cardiac arrest 1 mo after surgery while taking long drive.    Highest weight in life 330 lbs  Lowest weight 245 lbs after surgery         Morbid obesity  (H) 01/14/2021     Priority: Medium     S/p sleeve 2018   Highest wt 330 lbs  Lowest after surgery 245 lbs         Chronic inflammatory demyelinating polyneuropathy (H) 08/06/2020     Priority: Medium     Post COVID infection       Hemorrhoids, unspecified hemorrhoid type 06/05/2020     Priority: Medium     Paresthesias 05/30/2020     Priority: Medium     Secondary hyperparathyroidism, not elsewhere classified (H) 03/09/2020     Priority: Medium     Vitamin D deficiency 01/25/2018     Priority: Medium     Encounter for smoking cessation counseling 01/25/2018     Priority: Medium     Menorrhagia with irregular cycle 01/25/2018     Priority: Medium        Impression/Plan:    1. Mild obstructive sleep apnea, predominantly REM-related when it is severe and associated with significant desaturations in supine-REM. Severity likely to be underestimated due to the paucity of REM sleep on this study    Polysomnogram was reviewed in detail today  Treatment options discussed today including auto-CPAP,  oral appliance therapy or polysomnography with full night PAP titration.  Elected treatment with auto-CPAP 6-15 cm/H20.    She will follow up with me in about 8 week(s).     Misael Melendrez PA-C

## 2021-11-05 ENCOUNTER — VIRTUAL VISIT (OUTPATIENT)
Dept: SLEEP MEDICINE | Facility: CLINIC | Age: 31
End: 2021-11-05
Payer: COMMERCIAL

## 2021-11-05 DIAGNOSIS — G47.33 OSA (OBSTRUCTIVE SLEEP APNEA): Primary | ICD-10-CM

## 2021-11-05 LAB
HAV IGG SER QL IA: REACTIVE
HAV IGM SERPL QL IA: NONREACTIVE
HBV SURFACE AB SERPL IA-ACNC: 394.6 M[IU]/ML
HBV SURFACE AG SERPL QL IA: NONREACTIVE
HCV AB SERPL QL IA: NONREACTIVE

## 2021-11-05 PROCEDURE — 99213 OFFICE O/P EST LOW 20 MIN: CPT | Mod: 95 | Performed by: PHYSICIAN ASSISTANT

## 2021-11-05 NOTE — RESULT ENCOUNTER NOTE
Dear Hilaria  Your urine is showing excess protein.   Your have antibodies to hepatitis A.  You have antibodies and considered immune to hepatitis B.   Your liver tests are worse.   Your iron levels are normal.  Please schedule an appointment with one of the gastroenterologists at [facility] ([phone]) formerly called [facility].   You have diabetes- you have had 2 fasting blood sugars over 125.   Keep your upcoming appointment with the dietician   Schedule follow up with diabetes educator as soon as possible.   Schedule a face to face visit with me to discuss additional medications   Please call or MyChart my office with any questions or concerns.   Crissy Madrigal, PAC

## 2021-11-09 ENCOUNTER — THERAPY VISIT (OUTPATIENT)
Dept: PHYSICAL THERAPY | Facility: CLINIC | Age: 31
End: 2021-11-09
Payer: COMMERCIAL

## 2021-11-09 DIAGNOSIS — M54.6 BILATERAL THORACIC BACK PAIN, UNSPECIFIED CHRONICITY: Primary | ICD-10-CM

## 2021-11-09 PROCEDURE — 97112 NEUROMUSCULAR REEDUCATION: CPT | Mod: GP | Performed by: PHYSICAL THERAPIST

## 2021-11-09 PROCEDURE — 97110 THERAPEUTIC EXERCISES: CPT | Mod: GP | Performed by: PHYSICAL THERAPIST

## 2021-11-10 ENCOUNTER — TELEPHONE (OUTPATIENT)
Dept: SLEEP MEDICINE | Facility: CLINIC | Age: 31
End: 2021-11-10
Payer: COMMERCIAL

## 2021-11-10 DIAGNOSIS — G47.33 OSA (OBSTRUCTIVE SLEEP APNEA): ICD-10-CM

## 2021-11-11 NOTE — PROGRESS NOTES
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  In April 2020 she developed confirmed COVID infection. About 4 weeks later her neurologic symptoms began. Her first symptom was tingling in her hands where it then  progressed to a burning in her hands about 4-5 days after the numbness.  It then quickly progressed to involving bilateral legs below the knee and then her feet. She felt weak in her hands and feet. Fine motor tasks and gait became impaired. She has trouble picking up objects because she has to watch herself  objects. She needed a walker. She also felt that her speech became periodically slurred. The sensory symptoms that included pain and allodynia were most problematic in her hands and feet. NCS in 7/20 showed absent sensory responses in the upper and lower limbs and normal motor responses. In 8/20 IVIG 2 gm/kg loading and then 1 gm/kg q 3 week maintenance was started. Through the fall 2020 she made slow improvements, particular in function and gait. By 11/20 she felt \"50%\" better.     Interval History:   I last saw her 11/18/20. She has some concerns today. She is worried that some of her symptoms are getting worse. She still has numbness below her knees and wrists. The distribution is about the same as before, but the pain is more intense. She wonders if this is related to cold. Her gait is actually doing quite good. She comes to clinic today unassisted and without any gait support device. She has a walker, but does no use it very often. She still works with PT. No tremor. Strength is still good. She has been seen in the pain clinic with some improvements to her pain. She takes Lyrica 200mg TID and Cymbalta 60mg BID. She stopped accupuncture. Her last IVIG was a week ago. She is not sure how much it is benefiting her at this point. She does not notice any " treatment related fluctuations.     Prior pertinent laboratory work-up:  5/2020:  ANH 1:160. Vitamin B1 low (45). B12 low/normal (285). Negative/normal double stranded DNA, ANCA, C-reactive, RF, GM1, GD1, Gq1b, vitamin A, B6, Vit E, SSA, SSB undetectable.  6/2020 CSF: 0 WBC, 0 RBC, protein 58 glucose 29.  7/2020: Normal Vitamin B1, B12, folate    8/2020: TS-HDS mildly elevated at 18569 ([id number]). Negative FGFR3, Immunofixation, paraneoplastic panel,GD1a.     Prior electrophysiologic work-up:  7/23/20 NCS/EMG showed all absent upper and lower limb SNAPS and all normal upper and lower limb motor responses.   8/3/20 NCS/EMG showed absent right  median, ulnar, and sural sensory studies with radial having attenuated amplitude.      Prior pertinent imaging work-up:  5/20: MRI brain with and without contrast was essentially normal  5/20: MRI C spine with and without contrast showed no abnormal enhancement in the spinal cord, thecal sac or cervical vertebrae. No spinal canal or neural foraminal narrowing.    Past Medical History:   Past Medical History:   Diagnosis Date     Acute kidney injury (H) 05/13/2019     Cardiac arrest (H) 05/13/2019     Earache or other ear, nose, or throat complaint 12/15    tyroid     Pulmonary embolus (H) 05/13/2019     Past Surgical History:  Past Surgical History:   Procedure Laterality Date     GYN SURGERY      2 C-sections     KNEE SURGERY  most recently - [id number]    3 surgeries in [place]     LAPAROSCOPIC GASTRIC SLEEVE N/A 3/26/2019    Procedure: Laparoscopic Sleeve Gastrectomy;  Surgeon: Luan Lopez MD;  Location: [facility] OR     ORTHOPEDIC SURGERY      2 knee meniscus surgery     Family history:    There is no known family history of hereditary neuropathies or other neuromuscular disorders.     Social History:    Social History     Tobacco Use     Smoking status: Current Every Day Smoker     Packs/day: 0.30     Years: 1.00     Pack years: 0.30     Types: Cigarettes     Start date:  11/20/2016     Last attempt to quit: 1/12/2018     Years since quitting: 3.0     Smokeless tobacco: Never Used   Substance Use Topics     Alcohol use: Not Currently     Alcohol/week: 0.0 standard drinks     Comment: occasional     Drug use: No      Medical Allergies:   No Known Allergies     Current Medications:    Current Outpatient Medications:      cholecalciferol 50 MCG (2000 UT) tablet, Take 1 tablet by mouth daily, Disp: 90 tablet, Rfl: 3     diphenhydrAMINE (BENADRYL) 50 MG/ML injection, , Disp: , Rfl:      DULoxetine (CYMBALTA) 30 MG capsule, , Disp: , Rfl:      DULoxetine (CYMBALTA) 60 MG capsule, Take 1 capsule (60 mg) by mouth 2 times daily, Disp: 60 capsule, Rfl: 1     EPINEPHrine (ANY BX GENERIC EQUIV) 0.3 MG/0.3ML injection 2-pack, , Disp: , Rfl:      folic acid (FOLVITE) 1 MG tablet, Take 1 tablet (1 mg) by mouth daily, Disp: 30 tablet, Rfl: 11     HYDROcodone-acetaminophen (NORCO) 5-325 MG tablet, , Disp: , Rfl:      hydrOXYzine (ATARAX) 25 MG tablet, Take 1-2 tablets (25-50 mg) by mouth every 6 hours as needed for anxiety or other (adjuvant pain), Disp: 90 tablet, Rfl: 1     lisinopril (ZESTRIL) 10 MG tablet, Take 1 tablet (10 mg) by mouth daily, Disp: 90 tablet, Rfl: 0     Multiple Vitamins-Minerals (MULTIVITAMIN ADULTS) TABS, Take 1 tablet by mouth daily, Disp: , Rfl:      NEW MED, MEDICAL CANABIS, Disp: , Rfl:      omeprazole (PRILOSEC) 20 MG DR capsule, Take 1 capsule (20 mg) by mouth daily, Disp: 28 capsule, Rfl: 11     ondansetron (ZOFRAN-ODT) 4 MG ODT tab, Take 1 tablet (4 mg) by mouth every 8 hours as needed for nausea, Disp: 30 tablet, Rfl: 1     polyethylene glycol (MIRALAX) 17 GM/SCOOP powder, Take 17 g (1 capful) by mouth daily, Disp: 850 g, Rfl: 3     Pregabalin (LYRICA) 200 MG capsule, Take 1 capsule (200 mg) by mouth 3 times daily, Disp: 90 capsule, Rfl: 3     PRIVIGEN 20 GM/200ML SOLN, , Disp: , Rfl:      PRIVIGEN 40 GM/400ML SOLN, , Disp: , Rfl:      QUEtiapine (SEROQUEL) 25 MG  "tablet, , Disp: , Rfl:      rivaroxaban ANTICOAGULANT (XARELTO ANTICOAGULANT) 20 MG TABS tablet, Take 1 tablet (20 mg) by mouth daily (with dinner), Disp: 90 tablet, Rfl: 1     SOLU-CORTEF 100 MG injection, , Disp: , Rfl:      vitamin (B COMPLEX) tablet, Take 1 tablet by mouth daily, Disp: 90 tablet, Rfl: 3     vitamin C (ASCORBIC ACID) 1000 MG TABS, Take 1 tablet (1,000 mg) by mouth daily, Disp: 30 tablet, Rfl: 0     vitamin D3 (CHOLECALCIFEROL) 2000 units (50 mcg) tablet, Take 1 tablet (2,000 Units) by mouth daily, Disp: 30 tablet, Rfl: 0     buPROPion (WELLBUTRIN XL) 150 MG 24 hr tablet, , Disp: , Rfl:      topiramate (TOPAMAX) 50 MG tablet, , Disp: , Rfl:     Current Facility-Administered Medications:      cyanocobalamin injection 1,000 mcg, 1,000 mcg, Intramuscular, Q30 Days, Crissy Madrigal PA-C, 1,000 mcg at 11/10/20 1043     medroxyPROGESTERone (DEPO-PROVERA) syringe 150 mg, 150 mg, Intramuscular, Q90 Days, Crissy Madrigal PA-C, 150 mg at 01/04/21 0947    Review of Systems: A complete review of systems was obtained and was negative except for what was noted above.     Physical examination:    BP (!) 128/93   Pulse 99   Resp 16   Ht 1.702 m (5' 7\")   Wt 114.8 kg (253 lb)   SpO2 100%   BMI 39.63 kg/m      General Appearance: NAD    Skin: There are no rashes or other skin lesions.    Musculoskeletal:  There is no scoliosis, lordosis, kyphosis, pes cavus, or hammertoes.    Neurologic examination:    Mental status:  Patient is alert, attentive, and oriented x 3.  Language is coherent and fluent without aphasia.  Memory, comprehension and ability to follow commands were intact.       Cranial nerves:   Pupils were round and reacted to light.  Extraocular movements were full. There was no face, jaw, palate or tongue weakness or atrophy. Hearing was grossly intact.  Shoulder shrug was normal.       Motor exam: No atrophy or fasciculations.   Manual muscle testing revealed the following MRC grade " muscle power:   Right Left   Neck flexion 5    Neck extension: 5    Shoulder abduction:  5 5   Elbow extension: 5 5   Elbow flexion:  5 5   Wrist flexion:  5 5   Wrist extension:  5 5   FDI 4+ 4+   Hip flexion 5 5   Knee flexion 5 5   Knee extension 5 5   Dorsiflexion 5 5   Plantar flexion 5 5   Green indicates improved compared to last exam  Red indicates worse compared to last exam    Complex motor skills: Mild postural hand tremor. Mild ataxia with FNF.    Sensory exam: Pin decreased in distal leg, starting below the knee. Pin also reduced below the elbow bilaterally  Vibration absent at toe and ankle, 3s at knee, absent at finger and reduced wrist.     Gait: Base is narrow base, stable. No ataxia.     Deep tendon reflexes:   Right Left   Triceps 1 1   Biceps 1 1   Brachioradialis 2 2   Knee jerk 0 0   Ankle jerk 0 0     Neuropathy Assessments  Neurology Assessments 2021 2020 2020 8/3/2020   RODS CIDP/MGUSP Score 32 29 16 20   Time for 'Up and Go' test- Seconds: 9.79 10.2 15.9 24.09      Strength: 2021 2020 2020 8/3/2020   Right hand strength in K 18 20 16   Left hand strength in K 17 22 20     Immunotherapy 2021 2020 2020 8/3/2020   Time since last IVIG (days): 1 week 12 5 days -   Current treatment: IVIG 1 gm/kg q 3 weeks IVIG 1gm/kg g7leohm IVIG 1 gm/kg q 3 weeks none     Assessment:    Hilaria Bonner is a 30 year old woman with a 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 Although neuropathy due to nutritional deficiencies has also been considered, this is less likely. Noted also was a TS-HDS antibody, which is of uncertain significance. It is reassuring that she has had unequivocal improvement over the last several months, as is clearly documented by improvements in disability (I-RODS) and gait (TUG time). I am not sure what to make of her recent subjective changes, but I think those are unlikely  to indicate that her neuropathy is worsening. We plan to repeat the NCS later today. Provided those studies look as good or better than the previous testing we will begin IVIG optimization as below. Management of residual deficits remains supportive, and I am glad to find that she is approaching pain in a multidisciplinary setting.      Plan:      1. NCS today to assess for interval change.   2. IVIG: Reduce from 1 g/kg q 3 weeks to 1 gm/kg q 4 weeks. Additional taper pending clinical outcome.   3. Pain, improving: Continue follow up in the multidisciplinary pain clinic. Appreciate collateral care.   4. Gait, improving: Encouraged continue physical therapy  5. Labs: repeat B1, B6, B12, folate, copper, vit E today. I also encouraged continued healthy, well balanced nutrition.   6. Follow up in 2 months. Sooner if needed.   ----  ADDENDUM:   1/13/21: NCS still showed a non length-dependent sensory neuropathy or ganglionopathy, but compared to prior studies performed 8/3/20 and 7/23/20 there has been unequivocal interval improvement in sensory response amplitudes in the lower > upper limbs.     The NCS results are encouarging. Will proceed with the IVIG optimization plan as above, but with a low threshold to increase IVIg back to q 3 weeks if there is clinical decline.      No

## 2021-11-12 DIAGNOSIS — F29 PSYCHOSIS, UNSPECIFIED PSYCHOSIS TYPE (H): ICD-10-CM

## 2021-11-12 NOTE — TELEPHONE ENCOUNTER
"Date of Last Office Visit: 10/06/2021  Date of Next Office Visit: 11/29/2021   No shows since last visit: 0  Cancellations since last visit: 0    Medication requested: OLANZapine (ZYPREXA) 10 MG  Date last ordered: 10/06/2021 Qty: 30 Refills: 3 - possible these refills were cancelled due to CMA dc'ing order in system     Lapse in medication adherence greater than 5 days?: no  If yes, call patient and gather details: n/a  Medication refill request verified as identical to current order?: yes  Result of Last DAM, VPA, Li+ Level, CBC, or Carbamazepine Level (at or since last visit): N/A    Last visit treatment plan:   I am going to restart the patient's Zyprexa at 10 mg at at bedtime.  We also discussed some sleep hygiene issues.     []Medication refilled per  Medication Refill in Ambulatory Care  policy.  [x]Medication unable to be refilled by RN due to criteria not met as indicated below:    []Eligibility - not seen in the last year   []Supervision - no future appointment   []Compliance - no shows, cancellations or lapse in therapy   [x]Verification - order discrepancy (A [facility] dc'ed med on 11/04 indicating \"med cleanup/reconcilliation\" but previous order from prev. Provider still present.   []Controlled medication   [x]Medication not included in policy   []90-day supply request   []Other    "

## 2021-11-13 ENCOUNTER — TELEPHONE (OUTPATIENT)
Dept: FAMILY MEDICINE | Facility: CLINIC | Age: 31
End: 2021-11-13
Payer: COMMERCIAL

## 2021-11-13 NOTE — TELEPHONE ENCOUNTER
Patient has not read [facility] message- please call and advise     Dear Hilaria  Your urine is showing excess protein.   Your have antibodies to hepatitis A.  You have antibodies and considered immune to hepatitis B.   Your liver tests are worse.   Your iron levels are normal.  Please schedule an appointment with one of the gastroenterologists at [facility] ([phone]) formerly called [facility].   You have diabetes- you have had 2 fasting blood sugars over 125.   Keep your upcoming appointment with the dietician   Schedule follow up with diabetes educator as soon as possible.   Schedule a face to face visit with me to discuss additional medications   Please call or MyChart my office with any questions or concerns.   Josue Diamond

## 2021-11-13 NOTE — PROGRESS NOTES
Assessment & Plan     Type 2 diabetes mellitus with hyperglycemia, without long-term current use of insulin (H)  New diagnosis- did not start statin given abnormal lfts   Has appointment with diabetes education next week   Has had significant weight gain - morbid obestiy  Started metformin   Last A1C 5.6 but fasting blood sugars above 125    - Adult Eye Referral  - blood glucose monitoring (NO BRAND SPECIFIED) meter device kit  Dispense: 1 kit; Refill: 0  - blood glucose (NO BRAND SPECIFIED) test strip  Dispense: 100 strip; Refill: 11  - blood glucose calibration (NO BRAND SPECIFIED) solution  Dispense: 100 each; Refill: 11  - thin (NO BRAND SPECIFIED) lancets  Dispense: 100 each; Refill: 11  - alcohol swab prep pads  Dispense: 100 each; Refill: 11  - metFORMIN (GLUCOPHAGE-XR) 500 MG 24 hr tablet  Dispense: 360 tablet; Refill: 0  - FOOT EXAM    Benign essential hypertension  Blood pressure close to goal.  Refilled lisinopril and follow up with us in clinic in 3 month   - Home Blood Pressure Monitor Order for DME - ONLY FOR DME  - lisinopril (ZESTRIL) 10 MG tablet  Dispense: 90 tablet; Refill: 0    Pulmonary embolism with infarction (H)  Requires lifelong anticoagulant- refilled xarelto   - rivaroxaban ANTICOAGULANT (XARELTO ANTICOAGULANT) 20 MG TABS tablet  Dispense: 90 tablet; Refill: 0    Encounter for surveillance of injectable contraceptive    - medroxyPROGESTERone (DEPO-PROVERA) injection 150 mg    Vitamin B12 deficiency (non anemic)    - cyanocobalamin injection 1,000 mcg    Morbid obesity  Hopefully will get some insight into dietary improvement with diabetes education next week.  Reevaluate in 3 months     Review of the result(s) of each unique test - bmp, a1c, lipids  Prescription drug management  33 minutes spent on the date of the encounter doing chart review, history and exam, documentation and further activities per the note       Tobacco Cessation:   reports that she has been smoking cigarettes. She  started smoking about 4 years ago. She has a 0.25 pack-year smoking history. She has never used smokeless tobacco.  Tobacco Cessation Action Plan: Information offered: Patient not interested at this time    Patient Instructions   Schedule your eye exam at [facility] ([phone]) formerly called [facility].      start metformin 500 mg in am for one week then 500 mg twice a day for one week then 1000 mg twice a day with meals    Keep upcoming appointment with diabetes educator as scheduled.    Talk with gastroenterologist about need for statin medication like lipitor for cholesterol    Follow up with us in clinic in 3 months     Follow up with pain clinic regarding refills of tramadol and need for upcoming appointment         Return in about 3 months (around 2/16/2022), or if symptoms worsen or fail to improve, for in person.    Crissy Madrigal PA-C  [facility]   Hilaria is a 30 year old who presents for the following health issues  accompanied by her self.    History of Present Illness       Diabetes:   She presents for follow up of diabetes.  She is not checking blood glucose. She has no concerns regarding her diabetes at this time.  She is having numbness in feet, excessive thirst and weight gain. The patient has not had a diabetic eye exam in the last 12 months.         She eats 2-3 servings of fruits and vegetables daily.She consumes 2 sweetened beverage(s) daily.She exercises with enough effort to increase her heart rate 20 to 29 minutes per day.  She exercises with enough effort to increase her heart rate 3 or less days per week.   She is taking medications regularly.     Here today to go over resent lab results and discuss medication for DM  Also needs her depo and b12    Eye exam-vision works-  Another prescription for a blood pressure cuff -     Diabetes Follow-up      How often are you checking your blood sugar? Not at  all    What concerns do you have today about your diabetes? None     Do you have any of these symptoms? (Select all that apply)  Numbness in feet, Excessive thirst and Weight gain    Have you had a diabetic eye exam in the last 12 months? No          Hyperlipidemia Follow-Up      Are you regularly taking any medication or supplement to lower your cholesterol?   No    Are you having muscle aches or other side effects that you think could be caused by your cholesterol lowering medication?  No    Hypertension Follow-up      Do you check your blood pressure regularly outside of the clinic? No     Are you following a low salt diet? Yes    Are your blood pressures ever more than 140 on the top number (systolic) OR more   than 90 on the bottom number (diastolic), for example 140/90? Not checking   Blood pressure cuff broke 2 weeks ago  BP Readings from Last 2 Encounters:   11/16/21 (!) 120/90   10/01/21 120/86     Hemoglobin A1C (%)   Date Value   10/20/2021 5.6   03/02/2021 5.0   12/17/2020 5.7 (H)     LDL Cholesterol Calculated (mg/dL)   Date Value   10/22/2021 118 (H)   12/17/2020 112 (H)   09/30/2020     Cannot estimate LDL when triglyceride exceeds 400 mg/dL     LDL Cholesterol Direct (mg/dL)   Date Value   09/30/2020 88         How many servings of fruits and vegetables do you eat daily?  4 or more    On average, how many sweetened beverages do you drink each day (Examples: soda, juice, sweet tea, etc.  Do NOT count diet or artificially sweetened beverages)?   2    How many days per week do you exercise enough to make your heart beat faster? 3 or less    How many minutes a day do you exercise enough to make your heart beat faster? 9 or less    How many days per week do you miss taking your medication? 0    Continues to have significant neuropathy and is managed by neurology as well as pain clinic and asking me for tramadol  Has a PCA - has not worked for some time.  Review of Systems   Constitutional, HEENT,  "cardiovascular, pulmonary, GI, , musculoskeletal, neuro, skin, endocrine and psych systems are negative, except as otherwise noted.      Objective    BP (!) 120/90   Pulse 98   Temp 97.4  F (36.3  C) (Tympanic)   Ht 1.702 m (5' 7\")   Wt 131.1 kg (289 lb)   SpO2 98%   BMI 45.26 kg/m    Body mass index is 45.26 kg/m .  Physical Exam   GENERAL: alert, no distress and obese  NECK: no adenopathy, no asymmetry, masses, or scars and thyroid normal to palpation  RESP: lungs clear to auscultation - no rales, rhonchi or wheezes  CV: regular rate and rhythm, normal S1 S2, no S3 or S4, no murmur, click or rub, no peripheral edema and peripheral pulses strong  MS: no gross musculoskeletal defects noted, no edema  Diabetic foot exam: normal DP and PT pulses, no trophic changes or ulcerative lesions and unable to feel monofilament or pressure of entire bilateral feet to knee     Lab on 11/04/2021   Component Date Value Ref Range Status     Hepatitis A Antibody IgM 11/04/2021 Nonreactive  Nonreactive Final    IgM anti-HAV not detected. Does not exclude the possibility of recent exposure to or infection with HAV.     Hepatitis A Antibody IgG 11/04/2021 Reactive* Nonreactive Final     Bilirubin Total 11/04/2021 0.8  0.2 - 1.3 mg/dL Final     Bilirubin Direct 11/04/2021 0.3* 0.0 - 0.2 mg/dL Final     Protein Total 11/04/2021 7.8  6.8 - 8.8 g/dL Final     Albumin 11/04/2021 2.8* 3.4 - 5.0 g/dL Final     Alkaline Phosphatase 11/04/2021 147  40 - 150 U/L Final     AST 11/04/2021 115* 0 - 45 U/L Final     ALT 11/04/2021 82* 0 - 50 U/L Final     GGT 11/04/2021 369* 0 - 40 U/L Final     Ferritin 11/04/2021 66  12 - 150 ng/mL Final     Hepatitis B Surface Antibody 11/04/2021 394.60* <8.00 m[IU]/mL Final    Reactive, Patient is considered to be immune to infection with hepatitis B when the value is greater than or equal to 12.0 mIU/mL.     Hepatitis B Surface Antigen 11/04/2021 Nonreactive  Nonreactive Final     Hepatitis C Antibody " 11/04/2021 Nonreactive  Nonreactive Final     INR 11/04/2021 1.56* 0.85 - 1.15 Final     Iron 11/04/2021 108  35 - 180 ug/dL Final     Iron Binding Capacity 11/04/2021 327  240 - 430 ug/dL Final     Iron Sat Index 11/04/2021 33  15 - 46 % Final     Creatinine Urine mg/dL 11/04/2021 278  mg/dL Final     Albumin Urine mg/L 11/04/2021 118  mg/L Final     Albumin Urine mg/g Cr 11/04/2021 42.45* 0.00 - 25.00 mg/g Cr Final     Glucose 11/04/2021 126* 70 - 99 mg/dL Final     Patient Fasting > 8hrs? 11/04/2021 Yes   Final

## 2021-11-15 ENCOUNTER — HOME INFUSION (PRE-WILLOW HOME INFUSION) (OUTPATIENT)
Dept: PHARMACY | Facility: CLINIC | Age: 31
End: 2021-11-15
Payer: COMMERCIAL

## 2021-11-15 DIAGNOSIS — Z98.84 S/P LAPAROSCOPIC SLEEVE GASTRECTOMY: ICD-10-CM

## 2021-11-15 RX ORDER — CHOLECALCIFEROL (VITAMIN D3) 50 MCG
1 TABLET ORAL DAILY
Qty: 30 TABLET | Refills: 0 | Status: SHIPPED | OUTPATIENT
Start: 2021-11-15 | End: 2021-12-01

## 2021-11-15 RX ORDER — OLANZAPINE 10 MG/1
10 TABLET ORAL AT BEDTIME
Qty: 30 TABLET | Refills: 3 | Status: SHIPPED | OUTPATIENT
Start: 2021-11-15 | End: 2022-02-02

## 2021-11-15 NOTE — TELEPHONE ENCOUNTER
Called pt and informed her of providers message los.    Pt has follow up appointment with Rohan tomorrow.    Angelica Pérez RN  [facility]     Yes

## 2021-11-15 NOTE — TELEPHONE ENCOUNTER
vitamin D3 (CHOLECALCIFEROL) 50 mcg (2000 units) tablet  Last Written Prescription Date:  7/20/21  Last Fill Quantity: 30,   # refills: 0  Last Office Visit : 8/11/21  Future Office visit:  12/1/21

## 2021-11-16 ENCOUNTER — HOME INFUSION (PRE-WILLOW HOME INFUSION) (OUTPATIENT)
Dept: PHARMACY | Facility: CLINIC | Age: 31
End: 2021-11-16

## 2021-11-16 ENCOUNTER — OFFICE VISIT (OUTPATIENT)
Dept: FAMILY MEDICINE | Facility: CLINIC | Age: 31
End: 2021-11-16
Payer: COMMERCIAL

## 2021-11-16 VITALS
HEIGHT: 67 IN | OXYGEN SATURATION: 98 % | HEART RATE: 98 BPM | TEMPERATURE: 97.4 F | BODY MASS INDEX: 45.36 KG/M2 | SYSTOLIC BLOOD PRESSURE: 120 MMHG | WEIGHT: 289 LBS | DIASTOLIC BLOOD PRESSURE: 90 MMHG

## 2021-11-16 DIAGNOSIS — E66.01 MORBID OBESITY (H): ICD-10-CM

## 2021-11-16 DIAGNOSIS — E53.8 VITAMIN B12 DEFICIENCY (NON ANEMIC): ICD-10-CM

## 2021-11-16 DIAGNOSIS — I26.99 PULMONARY EMBOLISM WITH INFARCTION (H): ICD-10-CM

## 2021-11-16 DIAGNOSIS — Z30.42 ENCOUNTER FOR SURVEILLANCE OF INJECTABLE CONTRACEPTIVE: ICD-10-CM

## 2021-11-16 DIAGNOSIS — E11.65 TYPE 2 DIABETES MELLITUS WITH HYPERGLYCEMIA, WITHOUT LONG-TERM CURRENT USE OF INSULIN (H): Primary | ICD-10-CM

## 2021-11-16 DIAGNOSIS — I10 BENIGN ESSENTIAL HYPERTENSION: ICD-10-CM

## 2021-11-16 PROCEDURE — 99207 PR FOOT EXAM NO CHARGE: CPT | Performed by: PHYSICIAN ASSISTANT

## 2021-11-16 PROCEDURE — 96372 THER/PROPH/DIAG INJ SC/IM: CPT | Mod: 59 | Performed by: PHYSICIAN ASSISTANT

## 2021-11-16 PROCEDURE — 99214 OFFICE O/P EST MOD 30 MIN: CPT | Mod: 25 | Performed by: PHYSICIAN ASSISTANT

## 2021-11-16 RX ORDER — LISINOPRIL 10 MG/1
10 TABLET ORAL DAILY
Qty: 90 TABLET | Refills: 0 | Status: SHIPPED | OUTPATIENT
Start: 2021-11-16 | End: 2022-03-16

## 2021-11-16 RX ORDER — METFORMIN HCL 500 MG
TABLET, EXTENDED RELEASE 24 HR ORAL
Qty: 141 TABLET | Refills: 0 | Status: SHIPPED | OUTPATIENT
Start: 2021-11-16 | End: 2021-11-16

## 2021-11-16 RX ORDER — GLUCOSAMINE HCL/CHONDROITIN SU 500-400 MG
CAPSULE ORAL
Qty: 100 EACH | Refills: 11 | Status: SHIPPED | OUTPATIENT
Start: 2021-11-16 | End: 2023-01-24

## 2021-11-16 RX ORDER — LANCETS
EACH MISCELLANEOUS
Qty: 100 EACH | Refills: 11 | Status: SHIPPED | OUTPATIENT
Start: 2021-11-16 | End: 2023-04-20

## 2021-11-16 RX ORDER — METFORMIN HCL 500 MG
TABLET, EXTENDED RELEASE 24 HR ORAL
Qty: 360 TABLET | Refills: 0 | Status: SHIPPED | OUTPATIENT
Start: 2021-11-16 | End: 2022-02-15 | Stop reason: DRUGHIGH

## 2021-11-16 RX ORDER — CYANOCOBALAMIN 1000 UG/ML
1000 INJECTION, SOLUTION INTRAMUSCULAR; SUBCUTANEOUS
Status: DISCONTINUED | OUTPATIENT
Start: 2021-11-16 | End: 2023-05-12

## 2021-11-16 RX ORDER — MEDROXYPROGESTERONE ACETATE 150 MG/ML
150 INJECTION, SUSPENSION INTRAMUSCULAR
Status: ACTIVE | OUTPATIENT
Start: 2021-11-16 | End: 2022-11-11

## 2021-11-16 RX ADMIN — CYANOCOBALAMIN 1000 MCG: 1000 INJECTION, SOLUTION INTRAMUSCULAR; SUBCUTANEOUS at 11:11

## 2021-11-16 RX ADMIN — MEDROXYPROGESTERONE ACETATE 150 MG: 150 INJECTION, SUSPENSION INTRAMUSCULAR at 11:12

## 2021-11-16 ASSESSMENT — MIFFLIN-ST. JEOR: SCORE: 2063.53

## 2021-11-16 NOTE — PATIENT INSTRUCTIONS
Schedule your eye exam at [facility] ([phone]) formerly called [facility].      start metformin 500 mg in am for one week then 500 mg twice a day for one week then 1000 mg twice a day with meals    Keep upcoming appointment with diabetes educator as scheduled.    Talk with gastroenterologist about need for statin medication like lipitor for cholesterol    Follow up with us in clinic in 3 months     Follow up with pain clinic regarding refills of tramadol and need for upcoming appointment

## 2021-11-16 NOTE — PROGRESS NOTES
Follow Up Injection    Patient returning during stated date range given at previous visit: Yes      If here at the correct interval:   BP Readings from Last 1 Encounters:   11/16/21 (!) 120/90     Wt Readings from Last 1 Encounters:   11/16/21 131.1 kg (289 lb)       Last Pap/exam date: 9/27/19      Side effects or problems with last injection?  No.  Date range is given to patient for next dose: February 1-15 2022    See Medication Note for administration information    Staff Sig: Chelo Mattson CMA

## 2021-11-17 ENCOUNTER — THERAPY VISIT (OUTPATIENT)
Dept: PHYSICAL THERAPY | Facility: CLINIC | Age: 31
End: 2021-11-17
Payer: COMMERCIAL

## 2021-11-17 DIAGNOSIS — M54.6 BILATERAL THORACIC BACK PAIN, UNSPECIFIED CHRONICITY: Primary | ICD-10-CM

## 2021-11-17 DIAGNOSIS — M54.6 ACUTE THORACIC BACK PAIN, UNSPECIFIED BACK PAIN LATERALITY: ICD-10-CM

## 2021-11-17 PROCEDURE — 97112 NEUROMUSCULAR REEDUCATION: CPT | Mod: GP | Performed by: PHYSICAL THERAPIST

## 2021-11-17 PROCEDURE — 97110 THERAPEUTIC EXERCISES: CPT | Mod: GP | Performed by: PHYSICAL THERAPIST

## 2021-11-18 ENCOUNTER — DOCUMENTATION ONLY (OUTPATIENT)
Dept: PHARMACY | Facility: CLINIC | Age: 31
End: 2021-11-18
Payer: COMMERCIAL

## 2021-11-18 ENCOUNTER — HOME INFUSION (PRE-WILLOW HOME INFUSION) (OUTPATIENT)
Dept: PHARMACY | Facility: CLINIC | Age: 31
End: 2021-11-18
Payer: COMMERCIAL

## 2021-11-18 NOTE — PROGRESS NOTES
Skilled Nurse visit in the  patient home to administer Privigen 60g .  No recent elevated temperature, fever, chills, productive cough, coughing for 3 weeks or longer or hemoptysis, abnormal vital signs, night sweats, chest pain. No  decrease in your appetite, unexplained weight loss or fatigue.  No other new onset medical symptoms.  Current weight 289 lbs.  PIV placed left hand,  2 attempt/s.  Pre medicated with Acetaminophen 650 mg by mouth, 30 minutes prior to infusion. Diphenhydramine 50 mg by mouth, 30 minutes prior to infusion. Hydrocortisone 100 mg IV push over 2-5 minutes, 30 minutes prior to infusion. Infusion completed without complication or reaction. Pt reports therapy is effective  in managing symptoms related to therapy.      Love Ribera RN, BSN  [facility]  [phone]  [email or web address]

## 2021-11-19 ENCOUNTER — PATIENT OUTREACH (OUTPATIENT)
Dept: CARE COORDINATION | Facility: CLINIC | Age: 31
End: 2021-11-19
Payer: COMMERCIAL

## 2021-11-19 NOTE — PROGRESS NOTES
Clinic Care Coordination Contact  [facility]/Voicemail    Clinical Data: CHW Outreach  Outreach attempted x 1. Left message on patient's voicemail with call back information and requested return call.    Plan: CHW will call  again in 5-7 business days.    Alayna ESTRELLA CHW  Clinic Care Coordination  [facility] Clinics: [place], [place] & [place]  Phone: [phone]    Notes:  - How are you doing?  - Have been able to get on the [employer] website?  - Did you figure you your diabilities payments?    Care Gaps:  - Eye exam  - Physical

## 2021-11-22 ENCOUNTER — TELEPHONE (OUTPATIENT)
Dept: PALLIATIVE MEDICINE | Facility: CLINIC | Age: 31
End: 2021-11-22
Payer: COMMERCIAL

## 2021-11-22 DIAGNOSIS — M62.838 MUSCLE SPASM: Primary | ICD-10-CM

## 2021-11-22 DIAGNOSIS — G62.9 POLYNEUROPATHY: ICD-10-CM

## 2021-11-22 DIAGNOSIS — S22.070D COMPRESSION FRACTURE OF T9 VERTEBRA WITH ROUTINE HEALING, SUBSEQUENT ENCOUNTER: ICD-10-CM

## 2021-11-22 RX ORDER — TRAMADOL HYDROCHLORIDE 50 MG/1
50 TABLET ORAL EVERY 6 HOURS PRN
Qty: 20 TABLET | Refills: 0 | Status: CANCELLED | OUTPATIENT
Start: 2021-11-22

## 2021-11-22 NOTE — TELEPHONE ENCOUNTER
Reason for call:  Other   Patient called regarding (reason for call):   Additional comments: pt requesting to try the medication traMADol (ULTRAM) 50 MG tablet for her muscle spasms but wants to stop tiZANidine (ZANAFLEX) 2 MG tablet    Phone number to reach patient:  Home number on file [phone] (home)    Can we leave a detailed message on this number?  YES    Travel screening: Not Applicable         Tegan GALVAN    [facility]

## 2021-11-22 NOTE — TELEPHONE ENCOUNTER
I believe this was originally prescribed for compression fracture in July? I would imagine most of this acute pain has improved.  I am not sure why, four months later, she is requesting a refill of Tramadol. As we have discussed at previous visits, I do not recommend opioids for management of chronic pain. Especially in the setting of previous alcohol abuse concerns. I will not be refilling Tramadol or other opioids for chronic pain management.    RG Puri [facility]

## 2021-11-22 NOTE — TELEPHONE ENCOUNTER
DANK for pt to discuss medication changes requested.    Crissy GUTIERREZ RN Care Coordinator  [facility] Pain [facility]

## 2021-11-23 ENCOUNTER — TRANSFERRED RECORDS (OUTPATIENT)
Dept: HEALTH INFORMATION MANAGEMENT | Facility: CLINIC | Age: 31
End: 2021-11-23

## 2021-11-23 ENCOUNTER — THERAPY VISIT (OUTPATIENT)
Dept: PHYSICAL THERAPY | Facility: CLINIC | Age: 31
End: 2021-11-23
Payer: COMMERCIAL

## 2021-11-23 DIAGNOSIS — M54.6 BILATERAL THORACIC BACK PAIN, UNSPECIFIED CHRONICITY: Primary | ICD-10-CM

## 2021-11-23 PROCEDURE — 97112 NEUROMUSCULAR REEDUCATION: CPT | Mod: GP | Performed by: PHYSICAL THERAPIST

## 2021-11-23 PROCEDURE — 97110 THERAPEUTIC EXERCISES: CPT | Mod: GP | Performed by: PHYSICAL THERAPIST

## 2021-11-23 RX ORDER — METHOCARBAMOL 500 MG/1
500-1000 TABLET, FILM COATED ORAL 3 TIMES DAILY PRN
Qty: 75 TABLET | Refills: 1 | Status: SHIPPED | OUTPATIENT
Start: 2021-11-23 | End: 2023-01-24

## 2021-11-23 NOTE — TELEPHONE ENCOUNTER
Called pt and LM to call back to discuss    Kait CASSIDY, RN Care Coordinator  [facility]  Pain [facility]

## 2021-11-23 NOTE — TELEPHONE ENCOUNTER
Called pt. Advised that we would not be prescribing tramadol but would provide alternative muscle relaxant.  She does not recall ever having tried robaxin. Confirmed:[id number] - [place] [address] AT [facility]  Advised that she should begin by trying one in the evening as with all muscle relaxants, can have SE of sleepiness/groggy. Explained that as she tolerated Tizanidine well, she will likely tolerate robaxin but to call with questions/SE.    Routing to provider to review.    Kait CASSIDY, RN Care Coordinator  [facility]  Pain Management

## 2021-11-23 NOTE — TELEPHONE ENCOUNTER
Called pt. She states that tizanidine is no longer working for her. Reports taking 2 tablets tizanidine when she does take it. States that muscle spasms occur before and after her IV treatments. She states that PCP gave her tramadol for her back pain, she states she was advised by PCP to call pain clinic. States that tramadol works for both back pain and spasms. Advised that would route for review, that she may get a different muscle relaxant vs tramadol- if tramadol pt advised that she would be due to renew her annual requirements.     Has follow up 21.    CSA  21

## 2021-11-23 NOTE — TELEPHONE ENCOUNTER
This was already routed to me under primary care refill dated 11/22. Primary prescribed Tramadol in July and then again last month. I included my response below.    I believe this was originally prescribed for compression fracture in July? I would imagine most of this acute pain has improved.  I am not sure why, four months later, she is requesting a refill of Tramadol. As we have discussed at previous visits, I do not recommend opioids for management of chronic pain. Especially in the setting of previous alcohol abuse concerns. I will not be refilling Tramadol or other opioids for chronic pain management.    Please relay this to Hilaria. I would be okay with trying an alternative muscle relaxant if she is interested. Has she tried Robaxin before?    RG Puri [facility] Pain Management

## 2021-11-24 ENCOUNTER — ALLIED HEALTH/NURSE VISIT (OUTPATIENT)
Dept: EDUCATION SERVICES | Facility: CLINIC | Age: 31
End: 2021-11-24
Payer: COMMERCIAL

## 2021-11-24 VITALS — BODY MASS INDEX: 45.42 KG/M2 | WEIGHT: 290 LBS

## 2021-11-24 DIAGNOSIS — E11.9 TYPE 2 DIABETES MELLITUS WITHOUT COMPLICATION, WITHOUT LONG-TERM CURRENT USE OF INSULIN (H): ICD-10-CM

## 2021-11-24 DIAGNOSIS — E11.65 TYPE 2 DIABETES MELLITUS WITH HYPERGLYCEMIA, WITHOUT LONG-TERM CURRENT USE OF INSULIN (H): ICD-10-CM

## 2021-11-24 PROCEDURE — G0108 DIAB MANAGE TRN  PER INDIV: HCPCS | Performed by: DIETITIAN, REGISTERED

## 2021-11-24 NOTE — PATIENT INSTRUCTIONS
For the Metformin: take 1 tablet with dinner for one week, then take 2 tablets with dinner for one week, then take 3 tablets with dinner for 1 week, then take 4 tablets with dinner.     Start testing your blood sugar once/day    Diabetes Support Resources:  Websites: American Association of Diabetes Educators Participant Resources: www.diabeteseducator.org/living-with-diabetes   American Diabetes Association: www.diabetes.org  Diabetes Together 2 Goal: http://oqrhzomi1ohto.org     Bring blood glucose meter and logbook with you to all doctor and follow-up appointments.    Diabetes Education Telephone Visit Follow-up:    We realize your time is valuable and your health is important! We offer a convenient Telephone Visit follow up! It s a quick way to check in for a medication dose adjustment without having to come back to clinic as soon.    Telephone Visits are often covered by insurance. Please check with your insurance plan to see if this type of visit is covered. If not, the cost is less expensive than an office visit:      Up to 10 minutes (Code 55249): $30    11-20 minutes (Code 35320): $59    More than 20 minutes (Code 84234): $85    Talk with your Diabetes Educator if you want to learn more.      [facility]:  For Your Diabetes Education and Nutrition Appointments Call:  [phone]   For Diabetes Education or Nutrition Related Questions:   Phone: [phone]  Send Fix[employer] Message   If you need a medication refill please contact your pharmacy. Please allow 3 business days for your refills to be completed.      Janki Guerrero RD, LD, CDE

## 2021-11-24 NOTE — LETTER
11/24/2021         RE: Hilaria Bonner  [address]  [address]  [place] [facility] [place] [facility] [address]        Dear Colleague,    Thank you for referring your patient, Hilaria Bonner, to the [facility]. Please see a copy of my visit note below.    Diabetes Self-Management Education & Support    Presents for: Initial Assessment for new diagnosis    Type of Visit: In Person    How would patient like to obtain AVS? mychart    ASSESSMENT:    Hilaria Is confused about how she got diabetes. She states that her father has diabetes and takes insulin. She has switched to diet pop and cut out juice since her diagnosis. We discussed diabetes pathophysiology, basics of healthy eating for diabetes.       Patient's most recent   Lab Results   Component Value Date    A1C 5.6 10/20/2021    A1C 5.0 03/02/2021    is meeting goal of <7.0    Diabetes knowledge and skills assessment:   Patient is knowledgeable in diabetes management concepts related to: none at this time, new dx    Continue education with the following diabetes management concepts: Healthy Eating, Being Active, Monitoring, Taking Medication, Problem Solving, Reducing Risks and Healthy Coping    Based on learning assessment above, most appropriate setting for further diabetes education would be: Individual setting.    INTERVENTIONS:    Education provided today on:  AADE Self-Care Behaviors:  Diabetes Pathophysiology  Healthy Eating: carbohydrate counting, consistency in amount, composition, and timing of food intake, portion control, plate planning method and label reading  Monitoring: purpose, proper technique, log and interpret results, individual blood glucose targets, frequency of monitoring and proper sharps disposal  Taking Medication: action of prescribed medication and when to take medications    Opportunities for ongoing education and support in diabetes-self management were discussed. Pt verbalized understanding of concepts discussed  "and recommendations provided today.       Education Materials Provided:  [facility] Healthy Living with Diabetes Book, BG Log Sheet, Carbohydrate Counting and My Plate Planner    PLAN    Start taking Metformin  Start testing BG once/day    Topics to cover at upcoming visits: Healthy Eating, Being Active, Monitoring, Taking Medication, Problem Solving, Reducing Risks and Healthy Coping  Follow-up: scheduled for December 20th    See Goals Section for co-developed, patient-stated behavior change goals.  AVS provided to patient today.          SUBJECTIVE / OBJECTIVE:  Presents for: Initial Assessment for new diagnosis  Accompanied by: Self  Diabetes education in the past 24mo: No  Focus of Visit: Monitoring,Taking Medication  Diabetes type: Type 2  Date of diagnosis: October 2021  Disease course: Stable  Difficulty affording diabetes medication?: No  Difficulty affording diabetes testing supplies?: No  Cultural Influences/Ethnic Background:  American    Diabetes Symptoms & Complications:  Fatigue: Yes  Neuropathy: Sometimes (since last April, since Covid dx)  Polydipsia: Yes  Polyphagia: Sometimes  Polyuria: No  Visual change: Yes  Slow healing wounds: Yes  Symptom course: Stable  Weight trend: Stable  Complications assessed today?: No    Patient Problem List and Family Medical History reviewed for relevant medical history, current medical status, and diabetes risk factors.    Vitals:  Wt 131.5 kg (290 lb)   BMI 45.42 kg/m    Estimated body mass index is 45.42 kg/m  as calculated from the following:    Height as of 11/16/21: 1.702 m (5' 7\").    Weight as of this encounter: 131.5 kg (290 lb).   Last 3 BP:   BP Readings from Last 3 Encounters:   11/16/21 (!) 120/90   10/01/21 120/86   08/13/21 104/70       History   Smoking Status     Current Every Day Smoker     Packs/day: 0.25     Years: 1.00     Types: Cigarettes     Start date: 11/20/2016   Smokeless Tobacco     Never Used       Labs:  Lab Results   Component " Value Date    A1C 5.6 10/20/2021    A1C 5.0 03/02/2021     Lab Results   Component Value Date     11/04/2021     06/11/2021     Lab Results   Component Value Date     10/22/2021     12/17/2020     HDL Cholesterol   Date Value Ref Range Status   12/17/2020 68 >49 mg/dL Final     Direct Measure HDL   Date Value Ref Range Status   10/22/2021 47 (L) >=50 mg/dL Final   ]  GFR Estimate   Date Value Ref Range Status   10/22/2021 88 >60 mL/min/1.73m2 Final     Comment:     As of July 11, 2021, eGFR is calculated by the CKD-EPI creatinine equation, without race adjustment. eGFR can be influenced by muscle mass, exercise, and diet. The reported eGFR is an estimation only and is only applicable if the renal function is stable.   06/11/2021 85 >60 mL/min/[1.73_m2] Final     Comment:     Non  GFR Calc  Starting 12/18/2018, serum creatinine based estimated GFR (eGFR) will be   calculated using the Chronic Kidney Disease Epidemiology Collaboration   (CKD-EPI) equation.       GFR Estimate If Black   Date Value Ref Range Status   06/11/2021 >90 >60 mL/min/[1.73_m2] Final     Comment:      GFR Calc  Starting 12/18/2018, serum creatinine based estimated GFR (eGFR) will be   calculated using the Chronic Kidney Disease Epidemiology Collaboration   (CKD-EPI) equation.       Lab Results   Component Value Date    CR 0.88 10/22/2021    CR 0.91 06/11/2021     No results found for: MICROALBUMIN    Healthy Eating:  Healthy Eating Assessed Today: Yes  Breakfast: drinking water, not usually hungry in the morning OR protein shake - [employer]  Lunch: Bowl of Noodles OR sandwich - meat(turkey or chicken, alonso and mustard), with chips and pickle, sometimes with cucumbers  Dinner: chicken (usually drum sticks, leg quartesrs, chicken wings, chicken breast), starch (macaroni cheese, potatoes) and vegetables (broccoli, peas, green beans, corn  Snacks: chips (1 bag/day) - hot cheetos or lays,  pickles, occasional cookie or dessert, not much of a sweet tooth  Other: doesn't like yogurt and bananas. switched to diet pops  Beverages: Diet soda,Water,Energy drinks (crystal light flavoring.)  Has patient met with a dietitian in the past?: No    Being Active:  Being Active Assessed Today: Yes  Exercise:: Yes (walking, physical therapy once/week)  Days per week of moderate to strenuous exercise (like a brisk walk): 7  On average, minutes per day of exercise at this level: 10  Exercise Minutes per Week: 70    Monitoring:  Monitoring Assessed Today: Yes  Did patient bring glucose meter to appointment? : Yes    Glucose data:  Not testing yet      Taking Medications:  Diabetes Medication(s)     Biguanides       metFORMIN (GLUCOPHAGE-XR) 500 MG 24 hr tablet    Take 1 tablet (500 mg) by mouth daily (with dinner) for 7 days, THEN 1 tablet (500 mg) 2 times daily (with meals) for 7 days, THEN 2 tablets (1,000 mg) 2 times daily (with meals).          Taking Medication Assessed Today: Yes  Current Treatments: Oral Medication (taken by mouth)    Problem Solving:  Problem Solving Assessed Today: Yes  Is the patient at risk for hypoglycemia?: No  Is the patient at risk for DKA?: No              Reducing Risks:  Reducing Risks Assessed Today: Yes  Diabetes Risks: Sedentary Lifestyle,Ethnicity,Family History  CAD Risks: Diabetes Mellitus,Obesity,Sedentary lifestyle    Healthy Coping:  Healthy Coping Assessed Today: Yes  Patient Activation Measure Survey Score:  MARIAM Score (Last Two) 6/13/2019   MARIAM Raw Score 24   Activation Score 40.9   MARIAM Level 1             Janki Guerrero, RD, LD, CDE  Time Spent: 50 minutes  Encounter Type: Individual        Any diabetes medication dose changes were made via the Certified Diabetes Care & Education Protocol in collaboration with the patient's referring provider. A copy of this encounter was shared with the provider.

## 2021-11-27 NOTE — PROGRESS NOTES
This is a recent snapshot of the patient's [facility] medical record.  For current drug dose and complete information and questions, call [phone] or In Basket pool, [facility] home infusion ([id number])  CSN Number:  [id number]

## 2021-11-29 ENCOUNTER — VIRTUAL VISIT (OUTPATIENT)
Dept: BEHAVIORAL HEALTH | Facility: CLINIC | Age: 31
End: 2021-11-29
Payer: COMMERCIAL

## 2021-11-29 ENCOUNTER — PATIENT OUTREACH (OUTPATIENT)
Dept: NURSING | Facility: CLINIC | Age: 31
End: 2021-11-29
Payer: COMMERCIAL

## 2021-11-29 DIAGNOSIS — F41.1 GAD (GENERALIZED ANXIETY DISORDER): ICD-10-CM

## 2021-11-29 PROCEDURE — 99214 OFFICE O/P EST MOD 30 MIN: CPT | Mod: 95 | Performed by: PSYCHIATRY & NEUROLOGY

## 2021-11-29 RX ORDER — VENLAFAXINE HYDROCHLORIDE 37.5 MG/1
187.5 CAPSULE, EXTENDED RELEASE ORAL DAILY
Qty: 150 CAPSULE | Refills: 3 | Status: SHIPPED | OUTPATIENT
Start: 2021-11-29 | End: 2021-12-06
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
## 2021-11-29 NOTE — PROGRESS NOTES
Clinic Care Coordination Contact    Community Health Worker Follow Up    Care Gaps:     Health Maintenance Due   Topic Date Due     ADVANCE CARE PLANNING  Never done     Pneumococcal Vaccine: Pediatrics (0 to 5 Years) and At-Risk Patients (6 to 64 Years) (1 of 2 - PPSV23) Never done     COVID-19 Vaccine (1) Never done     HEPATITIS B IMMUNIZATION (1 of 3 - Risk 3-dose series) Never done     EYE EXAM  01/04/2019     URINE DRUG SCREEN  08/20/2021     INFLUENZA VACCINE (1) 09/01/2021     PREVENTIVE CARE VISIT  09/30/2021       CHW will address care gaps at next outreach.    Goals:   Goals Addressed as of 11/29/2021 at 2:15 PM                    Today       Psychosocial (pt-stated)   10%    Added 10/19/21 by Albino Pratt BSW      Goal Statement: I would like to move  Date Goal set: 10/19/2021  Barriers: Unsure what disability payments will be each month; Wants a three bedroom townhome or house to rent instead of an apartment- not sure if this will be financially feasible  Strengths: Patient is a great self advocate; Patient is enrolled in Care Coordination   Date to Achieve By: 2/2022  Patient expressed understanding of goal: Yes  Action steps to achieve this goal:  1. I will wait to see what my disability payments will be  2. I will review living options within my price range on [email or web address] once I get my monthly disability payments figured out  3. I will tour living options that are suitable for me and my children  4. I will move in to a new place.            CHW spoke with patient she said she has not looked for housing yet. She said she just got her award letter from social security explaining how much she will be getting once a month. She said that will start in January.    She asked CHW if there were any other housing resources out there she really want to move from [facility]. CHW said CHW will check with [facility] [place]. Patient would also like information about turning her dog into a service dog to help with  her depression. CHW will check with CC SW and get back to her.    CHW will address care gaps at next outreach.    Intervention and Education during outreach: CHW gave patient CHW phone number to call non-emergent questions or concern [phone]    CHW Plan: CHW will call patient in 1 month.    SANGEETA Young  Clinic Care Coordination  [facility]: [place], [place] & [place]  Phone: [phone]

## 2021-11-29 NOTE — TELEPHONE ENCOUNTER
RECORDS RECEIVED FROM: Internal   Appt Date: 11.30.2021   NOTES STATUS DETAILS   OFFICE NOTE from referring provider Internal 11.04.2021 Crissy Madrigal PA-C   OFFICE NOTES from other specialists N/A    DISCHARGE SUMMARY from hospital N/A    MEDICATION LIST Internal    LIVER BIOSPY (IF APPLICABLE)      PATHOLOGY REPORTS  N/A    IMAGING     ENDOSCOPY (IF AVAILABLE) N/A    COLONOSCOPY (IF AVAILABLE) N/A    ULTRASOUND LIVER Internal 11.04.2021 ULTRASOUND ABDOMEN COMPLETE    CT OF ABDOMEN N/A    MRI OF LIVER N/A    FIBROSCAN, US ELASTOGRAPHY, FIBROSIS SCAN, MR ELASTOGRAPHY N/A    LABS     HEPATIC PANEL (LIVER PANEL) Internal 11.04.2021   BASIC METABOLIC PANEL Internal 10.22.2021   COMPLETE METABOLIC PANEL Internal 07.13.2021   COMPLETE BLOOD COUNT (CBC) Internal 07.13.2021   INTERNATIONAL NORMALIZED RATIO (INR) Internal 11.04.2021   HEPATITIS C ANTIBODY Internal 11.04.2021   HEPATITIS C VIRAL LOAD/PCR N/A    HEPATITIS C GENOTYPE N/A    HEPATITIS B SURFACE ANTIGEN Internal 11.04.2021   HEPATITIS B SURFACE ANTIBODY Internal 11.04.2021   HEPATITIS B DNA QUANT LEVEL N/A    HEPATITIS B CORE ANTIBODY N/A         c/w [id number] and Plavix per neuro recs at last discharge, statin. Check LDL per last outpt neuro note, reviewed. PT eval. c/w [id number] and Plavix per neuro recs at last discharge, statin. Check LDL per last outpt neuro note, reviewed. PT eval. f/u repeat [facility]

## 2021-11-29 NOTE — PATIENT INSTRUCTIONS
Patient should return to this clinic in 3 months for medication check.  I have increased her Effexor XR to 187.5 mg a day.  She should continue to follow-up with her medical team as directed.  She agrees to call back with any questions or concerns.

## 2021-11-29 NOTE — NURSING NOTE
This video/telephone visit will be conducted via a call between you and your physician/provider. We have found that certain health care needs can be provided without the need for an in-person physical exam. This service lets us provide the care you need with a video /telephone conversation. If a prescription is necessary we can send it directly to your pharmacy. If lab work is needed we can place an order for that and you can then stop by our lab to have the test done at a later time.    Just as we bill insurance for in-person visits, we also bill insurance for video/telephone visits. If you have questions about your insurance coverage, we recommend that you speak with your insurance company.    Patient has given verbal consent for video/Telephone visit? yes  Patient would like telephone visit, please call: [phone]  JIM/RACQUEL : Young PERRY LPN    Pt is doing well, has no c/o today  Patient verified allergies, medications and pharmacy via phone. . Patient states she  is ready for visit.    ________________________________  Medications Phoned  to Pharmacy [] yes [x]no  Name of Pharmacist:  List Medications, including dose, quantity and instructions     Medications ordered this visit were e-scribed.  Verified by order class [x] yes  [] no  Effexor XR 37.5 mg  Medication changes or discontinuations were communicated to patient's pharmacy: [x] yes  [] no   Called [facility] and LVM d/c'd Effexor  mg dt dose change  Dictation completed at time of chart check: [] yes  [] no     I have checked the documentation for today s encounters and the above information has been reviewed and completed.      Elsy Flores on November 29, 2021 at 10:41 AM

## 2021-11-30 ENCOUNTER — PRE VISIT (OUTPATIENT)
Dept: GASTROENTEROLOGY | Facility: CLINIC | Age: 31
End: 2021-11-30

## 2021-12-01 ENCOUNTER — VIRTUAL VISIT (OUTPATIENT)
Dept: ENDOCRINOLOGY | Facility: CLINIC | Age: 31
End: 2021-12-01
Payer: COMMERCIAL

## 2021-12-01 VITALS — WEIGHT: 290 LBS | BODY MASS INDEX: 45.52 KG/M2 | HEIGHT: 67 IN

## 2021-12-01 DIAGNOSIS — E66.01 CLASS 3 SEVERE OBESITY DUE TO EXCESS CALORIES WITH SERIOUS COMORBIDITY AND BODY MASS INDEX (BMI) OF 45.0 TO 49.9 IN ADULT (H): ICD-10-CM

## 2021-12-01 DIAGNOSIS — Z98.84 S/P LAPAROSCOPIC SLEEVE GASTRECTOMY: ICD-10-CM

## 2021-12-01 DIAGNOSIS — E66.813 CLASS 3 SEVERE OBESITY DUE TO EXCESS CALORIES WITH SERIOUS COMORBIDITY AND BODY MASS INDEX (BMI) OF 45.0 TO 49.9 IN ADULT (H): ICD-10-CM

## 2021-12-01 PROCEDURE — 99443 PR PHYSICIAN TELEPHONE EVALUATION 21-30 MIN: CPT | Mod: 95 | Performed by: PHYSICIAN ASSISTANT

## 2021-12-01 RX ORDER — CHOLECALCIFEROL (VITAMIN D3) 50 MCG
1 TABLET ORAL DAILY
Qty: 30 TABLET | Refills: 0 | Status: SHIPPED | OUTPATIENT
Start: 2021-12-01 | End: 2022-02-07

## 2021-12-01 RX ORDER — MULTIVIT WITH MINERALS/LUTEIN
1000 TABLET ORAL DAILY
Qty: 90 TABLET | Refills: 3 | Status: SHIPPED | OUTPATIENT
Start: 2021-12-01 | End: 2023-04-20

## 2021-12-01 RX ORDER — BIOTIN 10 MG
1 TABLET ORAL 2 TIMES DAILY
Qty: 60 TABLET | Refills: 11 | Status: SHIPPED | OUTPATIENT
Start: 2021-12-01 | End: 2023-04-20

## 2021-12-01 ASSESSMENT — PAIN SCALES - GENERAL: PAINLEVEL: NO PAIN (0)

## 2021-12-01 ASSESSMENT — MIFFLIN-ST. JEOR: SCORE: 2068.06

## 2021-12-01 NOTE — PATIENT INSTRUCTIONS
PLAN:  See RD soon  Follow up 1 month MTM pharmacist to discuss how she is doing on metformin and possible addition of GLP1 Ozempic for T2DM which may help with weight loss.  Follow up 3 months return [facility]  Bariatric labs. Labs are needed.  Please call [phone] to schedule a lab only appointment at [facility] lab.  Refill vitamins today  Refill omeprazole 20mg daily    
I have reviewed and confirmed nurses' notes for patient's medications, allergies, medical history, and surgical history.

## 2021-12-01 NOTE — LETTER
2021       RE: Hilaria Bonner  [address]  [address]  [facility] [address]     Dear Colleague,    Thank you for referring your patient, Hilaria Bonner, to the [facility] [place] at [facility]. Please see a copy of my visit note below.    Hilaria is a 30 year old who is being evaluated via a billable telephone visit.      What phone number would you like to be contacted at? [phone]  How would you like to obtain your AVS? MyChart  Phone call duration: 30  Minutes      30 minutes spent on the date of the encounter doing chart review, history and exam, documentation and further activities per the note    Return Medical Weight Management Note     Hilaria Bonner  MRN:  [id number]  :  1990  HILDA:  2021    Dear Crissy Madrigal, KATTY,    I had the pleasure of seeing your patient Hilaria Bonner. She is a 30 year old female who I am continuing to see for treatment of obesity related to:       2018   I have the following health issues associated with obesity: High Cholesterol, Weight Bearing Joint Pain       Assessment & Plan   Problem List Items Addressed This Visit     S/P laparoscopic sleeve gastrectomy (Chronic)    Relevant Medications    vitamin D3 (CHOLECALCIFEROL) 50 mcg (2000 units) tablet    vitamin B-Complex    Multiple Vitamins-Minerals (MULTIVITAMIN ADULT) CHEW    calcium Citrate-vitamin D 500-400 MG-UNIT CHEW    Other Relevant Orders    CBC with platelets    Comprehensive metabolic panel    Ferritin    Lipid panel reflex to direct LDL Fasting    Vitamin D Deficiency    Vitamin B12    Vitamin A    Parathyroid Hormone Intact      Other Visit Diagnoses     Polyneuropathy        Relevant Medications    vitamin C (ASCORBIC ACID) 1000 MG TABS    Morbid (severe) obesity due to excess calories (H)        Relevant Medications    omeprazole (PRILOSEC) 20 MG DR capsule         INTERVAL  HISTORY:  S/p sleeve 2018 with weight loss from 320 lbs to 230 lbs and then regained to 290 lbs today  Weight stable since last visit  Has taken topiramate in the past but stopped by other providers because not effective for nerve pain and weight loss. She already struggles with brain fog and    Recent dx of T2DM with fasting blood sugars above 126.  Started on metformin 500 mg daily and is planning to increase to 1000mg daily Friday.  She is having some diarrhea but improving    She is eating 1 meal per day plus 2 protein shakes from [employer]  She is taking most of her vitamins but she wants refills and to clarify exactly what she should be taking.  Getting B12 at PCP monthly    PLAN:  See RD soon  Follow up 1 month MTM pharmacist to discuss how she is doing on metformin and possible addition of GLP1 Ozempic for T2DM which may help with weight loss.  Follow up 3 months return [facility]  Bariatric labs. Labs are needed.  Please call [phone] to schedule a lab only appointment at [facility] lab.  Refill vitamins today  Refill omeprazole 20mg daily      CURRENT WEIGHT:   290 lbs 0 oz    Initial Weight (lbs): 315.4 lbs  Last Visits Weight: 111.1 kg (244 lb 14.9 oz)  Cumulative weight loss (lbs): 25.4  Weight Loss Percentage: 8.05%    Changes and Difficulties 12/1/2021   I have made the following changes to my diet since my last visit: yes eating more shakes not eating   With regards to my diet, I am still struggling with: yes eating small portion but have no appetite   I have made the following changes to my activity/exercise since my last visit: no   With regards to my activity/exercise, I am still struggling with: no         MEDICATIONS:   Current Outpatient Medications   Medication Sig Dispense Refill     alcohol swab prep pads Use to swab area of injection/luke as directed. 100 each 11     blood glucose (NO BRAND SPECIFIED) test strip Use to test blood sugar 1 times daily or as directed. To  accompany: Blood Glucose Monitor Brands: per insurance. 100 strip 11     blood glucose calibration (NO BRAND SPECIFIED) solution To accompany: Blood Glucose Monitor Brands: per insurance. 100 each 11     blood glucose monitoring (NO BRAND SPECIFIED) meter device kit Use to test blood sugar 1 times daily or as directed. Preferred blood glucose meter OR supplies to accompany: Blood Glucose Monitor Brands: per insurance. 1 kit 0     Blood Pressure Monitoring (BLOOD PRESSURE MONITOR/ARM) BRAYAN        Calcium Carb-Cholecalciferol 500-10 MG-MCG CHEW CHEW AND SWALLOW ONE TABLET THREE TIMES DAILY 270 tablet 0     calcium carbonate (OS-QUINCY) 500 MG tablet Take 1 tablet by mouth 2 times daily       calcium Citrate-vitamin D 500-400 MG-UNIT CHEW Take 1 chew tab by mouth 3 times daily 90 tablet 11     COMPOUNDED NON-CONTROLLED SUBSTANCE (CMPD RX) - PHARMACY TO MIX COMPOUNDED MEDICATION Apply 1-2 g topically 3 times daily as needed (pain) Apply three times daily as needed for pain. 120 g 1     diphenhydrAMINE (BENADRYL) 50 MG/ML injection        EPINEPHrine (ANY BX GENERIC EQUIV) 0.3 MG/0.3ML injection 2-pack        folic acid (FOLVITE) 1 MG tablet Take 1 tablet (1 mg) by mouth daily 30 tablet 11     hydrochlorothiazide (HYDRODIURIL) 12.5 MG tablet TAKE 1 TABLET(12.5 MG) BY MOUTH DAILY 30 tablet 1     hydrOXYzine (ATARAX) 25 MG tablet Take 1 tablet (25 mg) by mouth every 6 hours as needed for anxiety 120 tablet 2     lisinopril (ZESTRIL) 10 MG tablet Take 1 tablet (10 mg) by mouth daily 90 tablet 0     medroxyPROGESTERone (DEPO-PROVERA) 150 MG/ML IM injection Inject 150 mg into the muscle every 3 months       metFORMIN (GLUCOPHAGE-XR) 500 MG 24 hr tablet Take 1 tablet (500 mg) by mouth daily (with dinner) for 7 days, THEN 1 tablet (500 mg) 2 times daily (with meals) for 7 days, THEN 2 tablets (1,000 mg) 2 times daily (with meals). 360 tablet 0     methocarbamol (ROBAXIN) 500 MG tablet Take 1-2 tablets (500-1,000 mg) by mouth 3  times daily as needed for muscle spasms 75 tablet 1     Multiple Vitamins-Minerals (MULTIVITAMIN ADULT) CHEW Take 1 chew tab by mouth 2 times daily 60 tablet 11     [facility] MEDICAL CANABIS        OLANZapine (ZYPREXA) 10 MG tablet Take 1 tablet (10 mg) by mouth At Bedtime 30 tablet 3     omeprazole (PRILOSEC) 20 MG DR capsule Take 1 capsule (20 mg) by mouth daily 90 capsule 3     ondansetron (ZOFRAN-ODT) 4 MG ODT tab DISSOLVE 1 TABLET(4 MG) ON THE TONGUE EVERY 8 HOURS AS NEEDED FOR NAUSEA 30 tablet 1     polyethylene glycol (MIRALAX) 17 GM/SCOOP powder Take 17 g (1 capful) by mouth daily 850 g 3     potassium chloride ER (KLOR-CON M) 20 MEQ CR tablet Take 1 tablet (20 mEq) by mouth 2 times daily 180 tablet 0     prazosin (MINIPRESS) 1 MG capsule Take 2 capsules (2 mg) by mouth At Bedtime 60 capsule 2     Pregabalin (LYRICA) 200 MG capsule Take 1 capsule (200 mg) by mouth 3 times daily 90 capsule 3     PRIVIGEN 20 GM/200ML SOLN        PRIVIGEN 40 GM/400ML SOLN        rivaroxaban ANTICOAGULANT (XARELTO ANTICOAGULANT) 20 MG TABS tablet Take 1 tablet (20 mg) by mouth daily (with dinner) 90 tablet 0     SODIUM CHLORIDE FLUSH 0.9 % flush        SOLU-CORTEF 100 MG injection        thin (NO BRAND SPECIFIED) lancets Use with lanceting device. To accompany: Blood Glucose Monitor Brands: per insurance. 100 each 11     UNABLE TO FIND 2,500 mcg by Oral or Feeding Tube route daily MEDICATION NAME: Vitamin B12       UNABLE TO FIND 5,000 mcg by Oral or Feeding Tube route daily MEDICATION NAME: OTC Biotin       UNABLE TO FIND 1 tablet by Oral or Feeding Tube route daily MEDICATION NAME: OTC Probiotics       venlafaxine (EFFEXOR-XR) 150 MG 24 hr capsule Take 1 capsule (150 mg) by mouth daily 30 capsule 2     venlafaxine (EFFEXOR-XR) 37.5 MG 24 hr capsule Take 5 capsules (187.5 mg) by mouth daily 150 capsule 3     vitamin B-Complex Take 1 tablet by mouth daily 90 tablet 3     vitamin C (ASCORBIC ACID) 1000 MG TABS Take 1 tablet (1,000  mg) by mouth daily 90 tablet 3     vitamin D3 (CHOLECALCIFEROL) 50 mcg (2000 units) tablet Take 1 tablet (50 mcg) by mouth daily 30 tablet 0       Weight Loss Medication History Reviewed With Patient 12/1/2021   Which weight loss medications are you currently taking on a regular basis?  None   If you are not taking a weight loss medication that was prescribed to you, please indicate why: Other   Are you having any side effects from the weight loss medication that we have prescribed you? No   If you are having side effects please describe: not on any medication for weight loss       Lab on 11/04/2021   Component Date Value Ref Range Status     Hepatitis A Antibody IgM 11/04/2021 Nonreactive  Nonreactive Final    IgM anti-HAV not detected. Does not exclude the possibility of recent exposure to or infection with HAV.     Hepatitis A Antibody IgG 11/04/2021 Reactive* Nonreactive Final     Bilirubin Total 11/04/2021 0.8  0.2 - 1.3 mg/dL Final     Bilirubin Direct 11/04/2021 0.3* 0.0 - 0.2 mg/dL Final     Protein Total 11/04/2021 7.8  6.8 - 8.8 g/dL Final     Albumin 11/04/2021 2.8* 3.4 - 5.0 g/dL Final     Alkaline Phosphatase 11/04/2021 147  40 - 150 U/L Final     AST 11/04/2021 115* 0 - 45 U/L Final     ALT 11/04/2021 82* 0 - 50 U/L Final     GGT 11/04/2021 369* 0 - 40 U/L Final     Ferritin 11/04/2021 66  12 - 150 ng/mL Final     Hepatitis B Surface Antibody 11/04/2021 394.60* <8.00 m[IU]/mL Final    Reactive, Patient is considered to be immune to infection with hepatitis B when the value is greater than or equal to 12.0 mIU/mL.     Hepatitis B Surface Antigen 11/04/2021 Nonreactive  Nonreactive Final     Hepatitis C Antibody 11/04/2021 Nonreactive  Nonreactive Final     INR 11/04/2021 1.56* 0.85 - 1.15 Final     Iron 11/04/2021 108  35 - 180 ug/dL Final     Iron Binding Capacity 11/04/2021 327  240 - 430 ug/dL Final     Iron Sat Index 11/04/2021 33  15 - 46 % Final     Creatinine Urine mg/dL 11/04/2021 278  mg/dL  "Final     Albumin Urine mg/L 11/04/2021 118  mg/L Final     Albumin Urine mg/g Cr 11/04/2021 42.45* 0.00 - 25.00 mg/g Cr Final     Glucose 11/04/2021 126* 70 - 99 mg/dL Final     Patient Fasting > 8hrs? 11/04/2021 Yes   Final       PHYSICAL EXAM:  Objective    Ht 1.702 m (5' 7\")   Wt 131.5 kg (290 lb)   BMI 45.42 kg/m      Vitals - Patient Reported  Pain Score: No Pain (0)          Sincerely,    Joanne Sterling PA-C      "

## 2021-12-01 NOTE — PROGRESS NOTES
Hilaria is a 30 year old who is being evaluated via a billable telephone visit.      What phone number would you like to be contacted at? [phone]  How would you like to obtain your AVS? Ferdinand  Phone call duration: 30  Minutes      30 minutes spent on the date of the encounter doing chart review, history and exam, documentation and further activities per the note    Return Medical Weight Management Note     Hilaria Bonner  MRN:  [id number]  :  1990  HILDA:  2021    Dear Crissy Madrigal, KATTY,    I had the pleasure of seeing your patient Hilaria Bonner. She is a 30 year old female who I am continuing to see for treatment of obesity related to:       2018   I have the following health issues associated with obesity: High Cholesterol, Weight Bearing Joint Pain       Assessment & Plan   Problem List Items Addressed This Visit     S/P laparoscopic sleeve gastrectomy (Chronic)    Relevant Medications    vitamin D3 (CHOLECALCIFEROL) 50 mcg (2000 units) tablet    vitamin B-Complex    Multiple Vitamins-Minerals (MULTIVITAMIN ADULT) CHEW    calcium Citrate-vitamin D 500-400 MG-UNIT CHEW    Other Relevant Orders    CBC with platelets    Comprehensive metabolic panel    Ferritin    Lipid panel reflex to direct LDL Fasting    Vitamin D Deficiency    Vitamin B12    Vitamin A    Parathyroid Hormone Intact      Other Visit Diagnoses     Polyneuropathy        Relevant Medications    vitamin C (ASCORBIC ACID) 1000 MG TABS    Morbid (severe) obesity due to excess calories (H)        Relevant Medications    omeprazole (PRILOSEC) 20 MG DR capsule         INTERVAL HISTORY:  S/p sleeve 2018 with weight loss from 320 lbs to 230 lbs and then regained to 290 lbs today  Weight stable since last visit  Has taken topiramate in the past but stopped by other providers because not effective for nerve pain and weight loss. She already struggles with brain fog and    Recent dx of T2DM with fasting blood sugars  above 126.  Started on metformin 500 mg daily and is planning to increase to 1000mg daily Friday.  She is having some diarrhea but improving    She is eating 1 meal per day plus 2 protein shakes from [employer]  She is taking most of her vitamins but she wants refills and to clarify exactly what she should be taking.  Getting B12 at PCP monthly    PLAN:  See RD soon  Follow up 1 month MTM pharmacist to discuss how she is doing on metformin and possible addition of GLP1 Ozempic for T2DM which may help with weight loss.  Follow up 3 months return [facility] Joanne  Bariatric labs. Labs are needed.  Please call [phone] to schedule a lab only appointment at [facility] lab.  Refill vitamins today  Refill omeprazole 20mg daily      CURRENT WEIGHT:   290 lbs 0 oz    Initial Weight (lbs): 315.4 lbs  Last Visits Weight: 111.1 kg (244 lb 14.9 oz)  Cumulative weight loss (lbs): 25.4  Weight Loss Percentage: 8.05%    Changes and Difficulties 12/1/2021   I have made the following changes to my diet since my last visit: yes eating more shakes not eating   With regards to my diet, I am still struggling with: yes eating small portion but have no appetite   I have made the following changes to my activity/exercise since my last visit: no   With regards to my activity/exercise, I am still struggling with: no         MEDICATIONS:   Current Outpatient Medications   Medication Sig Dispense Refill     alcohol swab prep pads Use to swab area of injection/luke as directed. 100 each 11     blood glucose (NO BRAND SPECIFIED) test strip Use to test blood sugar 1 times daily or as directed. To accompany: Blood Glucose Monitor Brands: per insurance. 100 strip 11     blood glucose calibration (NO BRAND SPECIFIED) solution To accompany: Blood Glucose Monitor Brands: per insurance. 100 each 11     blood glucose monitoring (NO BRAND SPECIFIED) meter device kit Use to test blood sugar 1 times daily or as directed. Preferred blood glucose  meter OR supplies to accompany: Blood Glucose Monitor Brands: per insurance. 1 kit 0     Blood Pressure Monitoring (BLOOD PRESSURE MONITOR/ARM) BRAYAN        Calcium Carb-Cholecalciferol 500-10 MG-MCG CHEW CHEW AND SWALLOW ONE TABLET THREE TIMES DAILY 270 tablet 0     calcium carbonate (OS-QUINCY) 500 MG tablet Take 1 tablet by mouth 2 times daily       calcium Citrate-vitamin D 500-400 MG-UNIT CHEW Take 1 chew tab by mouth 3 times daily 90 tablet 11     COMPOUNDED NON-CONTROLLED SUBSTANCE (CMPD RX) - PHARMACY TO MIX COMPOUNDED MEDICATION Apply 1-2 g topically 3 times daily as needed (pain) Apply three times daily as needed for pain. 120 g 1     diphenhydrAMINE (BENADRYL) 50 MG/ML injection        EPINEPHrine (ANY BX GENERIC EQUIV) 0.3 MG/0.3ML injection 2-pack        folic acid (FOLVITE) 1 MG tablet Take 1 tablet (1 mg) by mouth daily 30 tablet 11     hydrochlorothiazide (HYDRODIURIL) 12.5 MG tablet TAKE 1 TABLET(12.5 MG) BY MOUTH DAILY 30 tablet 1     hydrOXYzine (ATARAX) 25 MG tablet Take 1 tablet (25 mg) by mouth every 6 hours as needed for anxiety 120 tablet 2     lisinopril (ZESTRIL) 10 MG tablet Take 1 tablet (10 mg) by mouth daily 90 tablet 0     medroxyPROGESTERone (DEPO-PROVERA) 150 MG/ML IM injection Inject 150 mg into the muscle every 3 months       metFORMIN (GLUCOPHAGE-XR) 500 MG 24 hr tablet Take 1 tablet (500 mg) by mouth daily (with dinner) for 7 days, THEN 1 tablet (500 mg) 2 times daily (with meals) for 7 days, THEN 2 tablets (1,000 mg) 2 times daily (with meals). 360 tablet 0     methocarbamol (ROBAXIN) 500 MG tablet Take 1-2 tablets (500-1,000 mg) by mouth 3 times daily as needed for muscle spasms 75 tablet 1     Multiple Vitamins-Minerals (MULTIVITAMIN ADULT) CHEW Take 1 chew tab by mouth 2 times daily 60 tablet 11     NEW MED MEDICAL CANABIS        OLANZapine (ZYPREXA) 10 MG tablet Take 1 tablet (10 mg) by mouth At Bedtime 30 tablet 3     omeprazole (PRILOSEC) 20 MG DR capsule Take 1 capsule (20  mg) by mouth daily 90 capsule 3     ondansetron (ZOFRAN-ODT) 4 MG ODT tab DISSOLVE 1 TABLET(4 MG) ON THE TONGUE EVERY 8 HOURS AS NEEDED FOR NAUSEA 30 tablet 1     polyethylene glycol (MIRALAX) 17 GM/SCOOP powder Take 17 g (1 capful) by mouth daily 850 g 3     potassium chloride ER (KLOR-CON M) 20 MEQ CR tablet Take 1 tablet (20 mEq) by mouth 2 times daily 180 tablet 0     prazosin (MINIPRESS) 1 MG capsule Take 2 capsules (2 mg) by mouth At Bedtime 60 capsule 2     Pregabalin (LYRICA) 200 MG capsule Take 1 capsule (200 mg) by mouth 3 times daily 90 capsule 3     PRIVIGEN 20 GM/200ML SOLN        PRIVIGEN 40 GM/400ML SOLN        rivaroxaban ANTICOAGULANT (XARELTO ANTICOAGULANT) 20 MG TABS tablet Take 1 tablet (20 mg) by mouth daily (with dinner) 90 tablet 0     SODIUM CHLORIDE FLUSH 0.9 % flush        SOLU-CORTEF 100 MG injection        thin (NO BRAND SPECIFIED) lancets Use with lanceting device. To accompany: Blood Glucose Monitor Brands: per insurance. 100 each 11     UNABLE TO FIND 2,500 mcg by Oral or Feeding Tube route daily MEDICATION NAME: Vitamin B12       UNABLE TO FIND 5,000 mcg by Oral or Feeding Tube route daily MEDICATION NAME: OTC Biotin       UNABLE TO FIND 1 tablet by Oral or Feeding Tube route daily MEDICATION NAME: OTC Probiotics       venlafaxine (EFFEXOR-XR) 150 MG 24 hr capsule Take 1 capsule (150 mg) by mouth daily 30 capsule 2     venlafaxine (EFFEXOR-XR) 37.5 MG 24 hr capsule Take 5 capsules (187.5 mg) by mouth daily 150 capsule 3     vitamin B-Complex Take 1 tablet by mouth daily 90 tablet 3     vitamin C (ASCORBIC ACID) 1000 MG TABS Take 1 tablet (1,000 mg) by mouth daily 90 tablet 3     vitamin D3 (CHOLECALCIFEROL) 50 mcg (2000 units) tablet Take 1 tablet (50 mcg) by mouth daily 30 tablet 0       Weight Loss Medication History Reviewed With Patient 12/1/2021   Which weight loss medications are you currently taking on a regular basis?  None   If you are not taking a weight loss medication that  "was prescribed to you, please indicate why: Other   Are you having any side effects from the weight loss medication that we have prescribed you? No   If you are having side effects please describe: not on any medication for weight loss       Lab on 11/04/2021   Component Date Value Ref Range Status     Hepatitis A Antibody IgM 11/04/2021 Nonreactive  Nonreactive Final    IgM anti-HAV not detected. Does not exclude the possibility of recent exposure to or infection with HAV.     Hepatitis A Antibody IgG 11/04/2021 Reactive* Nonreactive Final     Bilirubin Total 11/04/2021 0.8  0.2 - 1.3 mg/dL Final     Bilirubin Direct 11/04/2021 0.3* 0.0 - 0.2 mg/dL Final     Protein Total 11/04/2021 7.8  6.8 - 8.8 g/dL Final     Albumin 11/04/2021 2.8* 3.4 - 5.0 g/dL Final     Alkaline Phosphatase 11/04/2021 147  40 - 150 U/L Final     AST 11/04/2021 115* 0 - 45 U/L Final     ALT 11/04/2021 82* 0 - 50 U/L Final     GGT 11/04/2021 369* 0 - 40 U/L Final     Ferritin 11/04/2021 66  12 - 150 ng/mL Final     Hepatitis B Surface Antibody 11/04/2021 394.60* <8.00 m[IU]/mL Final    Reactive, Patient is considered to be immune to infection with hepatitis B when the value is greater than or equal to 12.0 mIU/mL.     Hepatitis B Surface Antigen 11/04/2021 Nonreactive  Nonreactive Final     Hepatitis C Antibody 11/04/2021 Nonreactive  Nonreactive Final     INR 11/04/2021 1.56* 0.85 - 1.15 Final     Iron 11/04/2021 108  35 - 180 ug/dL Final     Iron Binding Capacity 11/04/2021 327  240 - 430 ug/dL Final     Iron Sat Index 11/04/2021 33  15 - 46 % Final     Creatinine Urine mg/dL 11/04/2021 278  mg/dL Final     Albumin Urine mg/L 11/04/2021 118  mg/L Final     Albumin Urine mg/g Cr 11/04/2021 42.45* 0.00 - 25.00 mg/g Cr Final     Glucose 11/04/2021 126* 70 - 99 mg/dL Final     Patient Fasting > 8hrs? 11/04/2021 Yes   Final       PHYSICAL EXAM:  Objective    Ht 1.702 m (5' 7\")   Wt 131.5 kg (290 lb)   BMI 45.42 kg/m      Vitals - Patient " Reported  Pain Score: No Pain (0)          Sincerely,    Joanne Sterling PA-C

## 2021-12-02 DIAGNOSIS — E66.01 CLASS 3 SEVERE OBESITY DUE TO EXCESS CALORIES WITH SERIOUS COMORBIDITY AND BODY MASS INDEX (BMI) OF 45.0 TO 49.9 IN ADULT (H): ICD-10-CM

## 2021-12-02 DIAGNOSIS — E66.813 CLASS 3 SEVERE OBESITY DUE TO EXCESS CALORIES WITH SERIOUS COMORBIDITY AND BODY MASS INDEX (BMI) OF 45.0 TO 49.9 IN ADULT (H): ICD-10-CM

## 2021-12-03 ENCOUNTER — PATIENT OUTREACH (OUTPATIENT)
Dept: CARE COORDINATION | Facility: CLINIC | Age: 31
End: 2021-12-03
Payer: COMMERCIAL

## 2021-12-03 ENCOUNTER — TELEPHONE (OUTPATIENT)
Dept: ENDOCRINOLOGY | Facility: CLINIC | Age: 31
End: 2021-12-03
Payer: COMMERCIAL

## 2021-12-03 NOTE — PROGRESS NOTES
Clinic Care Coordination Contact    Situation: Patient chart reviewed by care coordinator.    Background: Patient is enrolled in Care Coordination. CHW and SWCC are following.     Assessment: CHW spoke with patient on 11/29. She stated that she has not looked for housing. She was recently sent her award letter from social security with her monthly payment amount. She will be receiving these payments in January.     Plan/Recommendations: CHW will contact patient in one month from most recent outreach. SWCC will review chart in 6 weeks, per standard workflow, unless involvement is requested sooner    IVET Bowens  Primary Care Clinic- Social Work Care Coordinator  [facility] and [facility]lyn [facility]  Ph: [phone]  12/3/2021 3:02 PM

## 2021-12-03 NOTE — TELEPHONE ENCOUNTER
LVM and sent mychart.  -Follow up 1 month MTM pharmacist Lauren Bloch (discuss how she is doing on metformin and possible addition of GLP1 Ozempic for T2DM which may help with weight loss)   -Follow up 3 months return DOUGLAS Rubio

## 2021-12-04 ENCOUNTER — HEALTH MAINTENANCE LETTER (OUTPATIENT)
Age: 31
End: 2021-12-04

## 2021-12-06 ENCOUNTER — TELEPHONE (OUTPATIENT)
Dept: FAMILY MEDICINE | Facility: CLINIC | Age: 31
End: 2021-12-06
Payer: COMMERCIAL

## 2021-12-06 ENCOUNTER — HOME INFUSION (PRE-WILLOW HOME INFUSION) (OUTPATIENT)
Dept: PHARMACY | Facility: CLINIC | Age: 31
End: 2021-12-06
Payer: COMMERCIAL

## 2021-12-06 DIAGNOSIS — F41.1 GAD (GENERALIZED ANXIETY DISORDER): ICD-10-CM

## 2021-12-06 RX ORDER — VENLAFAXINE HYDROCHLORIDE 150 MG/1
150 CAPSULE, EXTENDED RELEASE ORAL DAILY
Qty: 30 CAPSULE | Refills: 2 | Status: CANCELLED | OUTPATIENT
Start: 2021-12-06

## 2021-12-06 NOTE — TELEPHONE ENCOUNTER
Pharmacy requesting RF on Venlafaxine HCL  mg.  Reviewed EMR, the order was changed to Venlafaxient HCL 37.5 mg to take 5 caps a day for TDD of 187.5 mg. Contacted pharmacy, five caps a day isn't going thru her ins so order needs to be swithched to 150 plus 37.5 mg capsules. According to staff, pt did not  the 37.5 mg capsules yet.    Orders pended for review as per ins request    Last evaluated on 11/29/21, treatment plan says:      Plan:  I am going to increase the patient's Effexor XR to 187.5 mg a day.  Risks and benefits were discussed  Total time for chart review, patient interview and documentation was 23 minutes.

## 2021-12-06 NOTE — TELEPHONE ENCOUNTER
"Per the 11/16/21 OV with ERIKA Madrigal:  \"Follow up with pain clinic regarding refills of tramadol and need for upcoming appointment\"    Per the 11/22/21 Refill request \"I will not be refilling tramadol or other opiods for chronic pain management\"    To provider to deny the tramadol.  RN unable.  Radha Talbert RN    "

## 2021-12-06 NOTE — TELEPHONE ENCOUNTER
We will not be refilling the ultram  Schedule an appointment with pain clinic to discuss alternative treatment

## 2021-12-06 NOTE — TELEPHONE ENCOUNTER
omeprazole (PRILOSEC) 20 MG DR capsule    Take 1 capsule (20 mg) by mouth daily     Last Written Prescription Date:  12/1/21  Last Fill Quantity: 90,   # refills: 3  Last Office Visit : 12/1/21  Future Office visit:  none    Duplicate.     Sent to requesting pharmacy.   [id number] - [place], [place] - [address] AT [facility]

## 2021-12-07 RX ORDER — VENLAFAXINE HYDROCHLORIDE 37.5 MG/1
37.5 CAPSULE, EXTENDED RELEASE ORAL DAILY
Qty: 150 CAPSULE | Refills: 2 | Status: SHIPPED | OUTPATIENT
Start: 2021-12-07 | End: 2022-02-02 | Stop reason: DRUGHIGH

## 2021-12-07 RX ORDER — VENLAFAXINE HYDROCHLORIDE 150 MG/1
150 CAPSULE, EXTENDED RELEASE ORAL DAILY
Qty: 30 CAPSULE | Refills: 2 | Status: SHIPPED | OUTPATIENT
Start: 2021-12-07 | End: 2022-02-02

## 2021-12-09 ENCOUNTER — DOCUMENTATION ONLY (OUTPATIENT)
Dept: PHARMACY | Facility: CLINIC | Age: 31
End: 2021-12-09
Payer: COMMERCIAL

## 2021-12-09 ENCOUNTER — TELEPHONE (OUTPATIENT)
Dept: FAMILY MEDICINE | Facility: CLINIC | Age: 31
End: 2021-12-09

## 2021-12-09 ENCOUNTER — HOME INFUSION (PRE-WILLOW HOME INFUSION) (OUTPATIENT)
Dept: PHARMACY | Facility: CLINIC | Age: 31
End: 2021-12-09
Payer: COMMERCIAL

## 2021-12-09 DIAGNOSIS — I10 BENIGN ESSENTIAL HYPERTENSION: ICD-10-CM

## 2021-12-09 DIAGNOSIS — E87.6 HYPOKALEMIA: ICD-10-CM

## 2021-12-09 DIAGNOSIS — R60.0 BILATERAL LEG EDEMA: ICD-10-CM

## 2021-12-09 DIAGNOSIS — G62.9 POLYNEUROPATHY: Primary | ICD-10-CM

## 2021-12-09 DIAGNOSIS — E66.01 MORBID OBESITY (H): ICD-10-CM

## 2021-12-09 DIAGNOSIS — I26.99 PULMONARY EMBOLISM WITH INFARCTION (H): ICD-10-CM

## 2021-12-09 DIAGNOSIS — E11.65 TYPE 2 DIABETES MELLITUS WITH HYPERGLYCEMIA, WITHOUT LONG-TERM CURRENT USE OF INSULIN (H): ICD-10-CM

## 2021-12-09 NOTE — TELEPHONE ENCOUNTER
Received notification from home care infusion that patient has been taking medications differently than prescribed and unable to reconcile medications with epic.  Will involve care coordinator and hopefully we can get home care to set up medications and review with patient   Also involve MTM   Patient is a poor historian

## 2021-12-09 NOTE — PROGRESS NOTES
Skilled Nurse visit in the  patient home to administer Privigen 60g .  No recent elevated temperature, fever, chills, productive cough, coughing for 3 weeks or longer or hemoptysis, abnormal vital signs, night sweats, chest pain. No  decrease in your appetite, unexplained weight loss or fatigue.  No other new onset medical symptoms.  Current weight 290 lbs.  PIV placed left hand, 1 attempt/s.  Pre medicated with Acetaminophen 650 mg by mouth, 30 minutes prior to infusion,  Diphenhydramine 50 mg by mouth, 30 minutes prior to infusion,  Hydrocortisone 100 mg IV push over 2-5 minutes, 30 minutes prior to infusion. Infusion completed without complication or reaction. Pt reports therapy is effective  in managing symptoms related to therapy.    Love Ribera RN, BSN  [facility]  [phone]  [email or web address]

## 2021-12-10 ENCOUNTER — PATIENT OUTREACH (OUTPATIENT)
Dept: CARE COORDINATION | Facility: CLINIC | Age: 31
End: 2021-12-10
Payer: COMMERCIAL

## 2021-12-10 NOTE — PROGRESS NOTES
This is a recent snapshot of the patient's [facility] medical record.  For current drug dose and complete information and questions, call [phone] or In Basket pool, fv home infusion ([id number])  CSN Number:  [id number]

## 2021-12-10 NOTE — PROGRESS NOTES
Clinic Care Coordination Contact  RUST/Voicemail     Clinical Data: CHW Outreach  Outreach attempted x 1. Left message on patient's voicemail with call back information and requested return call.    Plan: CHW will try to reach patient again in 1-2 business days.    Alayna ESTRELLA CHW  Clinic Care Coordination  [facility]: [place], [place] & [place]  Phone: [phone]    Notes:  - Would you like  home care services to come into home help with setting medications.    - Referred by PCP    Compliance with medical treatment plan  Can home care come in and help set up medications etc

## 2021-12-13 ENCOUNTER — PATIENT OUTREACH (OUTPATIENT)
Dept: NURSING | Facility: CLINIC | Age: 31
End: 2021-12-13
Payer: COMMERCIAL

## 2021-12-13 NOTE — PROGRESS NOTES
Clinic Care Coordination Contact  Community Health Worker Initial Outreach    Patient accepts CC: No, Patient declined CC services. She currently has a PCA that help set up medications. . Patient will be sent Care Coordination introduction letter for future reference.     SANGEETA Young  Clinic Care Coordination  [facility]: [facility] [place] & [place]  Phone: [phone]    Compliance with medical treatment plan

## 2021-12-14 NOTE — NURSING NOTE
DME orders have been automatically faxed to [facility].  Our [facility] people will help patient schedule a follow-up once he gets machine. Linda Jacques [facility]

## 2021-12-16 NOTE — PROGRESS NOTES
"Hilaria Bonner is a 30 year old female who is being evaluated via a billable telephone visit.       The patient has been notified of following:      \"This telephone visit will be conducted via a call between you and your physician/provider. We have found that certain health care needs can be provided without the need for a physical exam.  This service lets us provide the care you need with a short phone conversation.  If a prescription is necessary we can send it directly to your pharmacy.  If lab work is needed we can place an order for that and you can then stop by our lab to have the test done at a later time.     Telephone visits are billed at different rates depending on your insurance coverage. During this emergency period, for some insurers they may be billed the same as an in-person visit.  Please reach out to your insurance provider with any questions.     If during the course of the call the physician/provider feels a telephone visit is not appropriate, you will not be charged for this service.\"     Patient has given verbal consent for Telephone visit?  Yes     What phone number would you like to be contacted at? [phone]     How would you like to obtain your AVS? MyChart     Phone call duration: 30 min     During this virtual visit the patient is located in [place], patient verifies this as the location during the entirety of this visit.         Reason For Visit:  Hilaria Bonner is a 30 year old female, completed a virtual visit today for nutrition follow-up s/p SG with Dr Lopez (3/26/19).  Patient referred by Joanne TOVAR.      Anthropometrics  Initial Consult Weight: 315.4 lbs  Day of Surgery Weight(3/26/19): 291.2 lbs     Estimated body mass index is 45.3 kg/m  as calculated from the following:    Height as of 12/1/21: 1.702 m (5' 7\").    Weight as of 12/20/21: 131.2 kg (289 lb 3.2 oz).     Current weight: 289 lbs from 12/20/21     Current Vitamins/Minerals:  Centrum MVI (no iron per pt, 0.8 mg " Cu, 2.2 mg Thiamin, 18 mg Zinc, 6.4 mg B6)  Calcium Citrate  Vit C  Vitamin D3 (2000 international unit(s))  B-50 complex - will stop  B-complex prescribed by Joanne     Oct 13th - reports she is taking chewable Ca (not sure dose), Vit D3, MVI with iron, B-50 complex 1x/day, folate 1x/day, potassium chloride 2x/day, B12 injection (needs to schedule - had last on 9/3/21)  *RD forgot to check in on Vit C     Labs  BMP done 8/13  - electrolytes WNL    B6 done 6/11/21 - normal      A1C 3/2/21 - normal at 5.0, was 5.7 3 months prior to that      Mg 2/7/21 - normal      Vit D, Folate, Cu, Vit E, Vit B1, B12, B6 1/13/21 - all normal besides Cu slightly high, B1 slightly high, B6 slightly high     Ferritin 12/17/20 - normal     Lipids 12/17/20 - chol 200, LDL calculated 112     Vit A 12/17/20 - normal    12/21/21 - discontinue B50 complex, continue with bcomplex prescribed by Joanne. Sending in order for MVI with iron.     Nutrition History:      August 11 2021  Pt has not seen RD since 12/16/2020     No appetite for ~ 2 weeks, feeling full off of very little amounts     Recall:  Shrimp fried rice   Fluids: water (5-6 bottles)  Does not recall if she ate anything else     Has been trying to cut back on fluids, reports last RD told her she was drinking way too much . Was drinking around  oz. Reports she tries to stay hydrated for treatment and might have to get fluids in the ER if not staying hydrated. Encouraged pt to work on hydration as well as intake      Wanted to review foods she should/should not be eating. Wants to know what starchy vegetables are.     Reviewed dietary recommendations/guidelines   - Pt recalls protein minimum   - Pt recalls  fluids from 30 min before/after but has not been doing  - Sugar/fat - pt did not remember, informed her of recommendations      Mailed following handouts:   Regular diet, protein sources, meal ideas, site with bariatric plates/utensils, dietary guidelines after  bariatric surgery     Sept 13th   No major changes since last appt per pt.   Still struggling with appetite. Will notice an increase in appetite after treatment (infusions)     Some days (1-2x/week) will get super hungry and then eat fast. Taking another bite before first bite is even gone.     Recent recall  Protein shake in am  Chicken salad for lunch (forced self to eat) - got about 5-6 bites. Using baby spoon  Protein shake for dinner   Fluids: doing 64 oz/day,  from lunch time meal     Oct 13th   No major changes - continues with little to no appetite.   Pt thinks this may be due to her psych meds, recent increase in dose. Pt plans to discuss further with Brian at her appt on 11/29.      Common foods consumed: eggs, salmon,chicken, canned vegetables (corn, broccoli)     Recent recall:  Eating 2 meals/day on average. Having a protein shake if not having a meal ([employer] brand - high performance with 30 g protein, 1 g sugar)   Breakfast: eggs or protein shake  Lunch: sandwich - with turkey or chicken lunch meat, sometimes cheese, alonso and bread.   Dinner: 2 chicken drummies,1/2 scoop macaroni, 1/2 scoop green beans  *Using baby utensils/plates      Tolerating bread okay per pt. Discussed how toasting it or having an open face sandwich may be better tolerated since bread expands and is not tolerated as well after bariatric surgery.     Dec 2021:    Did not see pt last month. Had discussed follow up appt for Nov 30th in session but appt was never scheduled fully on RD's end.     Recent dx of type 2 diabetes per pt - fasting BG above 126.  Recently saw  for diabetes education on 11/24/21 and again yesterday, 12/21.  Started Metformin.   Pt had a couple questions about carb counting, answered.     Continues to have little appetite. Eating 1-2 meals/day. Will do protein shake if not having a meal. Doing Slimfast currently.  Eating beef stew    Doing well staying hydrated.     Reviewed supplements.      PREVIOUS GOALS:  1) Continue bariatric regular diet as tolerated. - Met     2) Aim for 3 meals per day.   - Continue with protein shake if not able to eat a meal - Met  - Consider having bread toasted or doing open face sandwich - Met, tried toasting it. Only able to eat 1/2 sandwich.      3) Consume 60 grams of protein/day.               - Try to protein foods first.      4) Sip on 64-96 oz of fluids/day- between meals only (do not drink 30 mins before or after meal or while you're eating)     4) Eat slowly (>20 min/meal), chewing foods well (to applesauce-like consistency).     5) Take supplements as prescribed. Set up appointment to get B12 injection for October and November. - Met     6) New scale     7) Talk with Dr. Torres about medications and appetite. - Met       Nutrition Prescription:  Grams Protein: 50-60 (minimum)  Amount of Fluid: 48-64 oz     Nutrition Diagnosis   Food and nutrition-related knowledge deficit r/t limited prior exposure to maintenance diet guidelines after bariatric surgery aeb pt unable to verbalize full understanding of maintenance diet guidelines post bariatric surgery.      Intervention  Mailed following handouts:   Assessed current dietary habits  Nutrition Education on Dietary recommendations   Coordination of care - AVS via mail  Answered pt questions     Goals:  1.  Multivitamin with iron. Can stop MVI without iron and transition to this one.     2. Stop B-50 complex, continue with prescribed supplements.     3. Sip on 64-96 oz of fluids/day- between meals only (do not drink 30 mins before or after meal or while you're eating)    4. Eat slowly (>20 min/meal), chewing foods well (to applesauce-like consistency).    5. Continue to aim for 3 meals per day.   - Continue with protein shake if not able to eat a meal    4. Aim for  60 grams of protein/day.               - Try to protein foods first.      5. Schedule follow up appt for January      Diet Guidelines after  Weight-loss Surgery  [email or web address]      Regular Foods/Meal Ideas  [email or web address]      Protein Sources   [email or web address]      Follow-Up:  monthly     Time spent with pt: 31 mins  SIL Kate, RD, LD

## 2021-12-20 ENCOUNTER — OFFICE VISIT (OUTPATIENT)
Dept: PHARMACY | Facility: CLINIC | Age: 31
End: 2021-12-20
Attending: PHYSICIAN ASSISTANT
Payer: COMMERCIAL

## 2021-12-20 ENCOUNTER — VIRTUAL VISIT (OUTPATIENT)
Dept: EDUCATION SERVICES | Facility: CLINIC | Age: 31
End: 2021-12-20
Payer: COMMERCIAL

## 2021-12-20 VITALS
BODY MASS INDEX: 45.3 KG/M2 | DIASTOLIC BLOOD PRESSURE: 80 MMHG | SYSTOLIC BLOOD PRESSURE: 117 MMHG | HEART RATE: 100 BPM | WEIGHT: 289.2 LBS

## 2021-12-20 DIAGNOSIS — R63.4 WEIGHT LOSS: ICD-10-CM

## 2021-12-20 DIAGNOSIS — E11.65 TYPE 2 DIABETES MELLITUS WITH HYPERGLYCEMIA, WITHOUT LONG-TERM CURRENT USE OF INSULIN (H): Primary | ICD-10-CM

## 2021-12-20 DIAGNOSIS — F41.1 GAD (GENERALIZED ANXIETY DISORDER): ICD-10-CM

## 2021-12-20 DIAGNOSIS — K21.9 GASTROESOPHAGEAL REFLUX DISEASE WITHOUT ESOPHAGITIS: ICD-10-CM

## 2021-12-20 DIAGNOSIS — R25.2 MUSCLE CRAMPS: ICD-10-CM

## 2021-12-20 DIAGNOSIS — E11.9 TYPE 2 DIABETES MELLITUS WITHOUT COMPLICATION, WITHOUT LONG-TERM CURRENT USE OF INSULIN (H): Primary | ICD-10-CM

## 2021-12-20 DIAGNOSIS — I26.99 PULMONARY EMBOLISM WITH INFARCTION (H): ICD-10-CM

## 2021-12-20 DIAGNOSIS — F29 PSYCHOSIS, UNSPECIFIED PSYCHOSIS TYPE (H): ICD-10-CM

## 2021-12-20 DIAGNOSIS — G62.9 NEUROPATHY: ICD-10-CM

## 2021-12-20 DIAGNOSIS — I10 BENIGN ESSENTIAL HYPERTENSION: ICD-10-CM

## 2021-12-20 DIAGNOSIS — R68.2 DRY MOUTH: ICD-10-CM

## 2021-12-20 DIAGNOSIS — Z98.84 S/P LAPAROSCOPIC SLEEVE GASTRECTOMY: ICD-10-CM

## 2021-12-20 PROCEDURE — G0108 DIAB MANAGE TRN  PER INDIV: HCPCS | Mod: 95 | Performed by: DIETITIAN, REGISTERED

## 2021-12-20 PROCEDURE — 99605 MTMS BY PHARM NP 15 MIN: CPT | Performed by: PHARMACIST

## 2021-12-20 PROCEDURE — 99607 MTMS BY PHARM ADDL 15 MIN: CPT | Performed by: PHARMACIST

## 2021-12-20 RX ORDER — DIPHENHYDRAMINE HCL 50 MG
50 CAPSULE ORAL EVERY 6 HOURS PRN
COMMUNITY
End: 2023-05-12

## 2021-12-20 RX ORDER — ANTIARTHRITIC COMBINATION NO.2 900 MG
1 TABLET ORAL DAILY
COMMUNITY
End: 2023-04-20

## 2021-12-20 NOTE — PROGRESS NOTES
Diabetes Self-Management Education & Support    Presents for: Follow-up    Type of Visit: Telephone Visit    How would patient like to obtain AVS? Ferdinand    ASSESSMENT:    Hilaria states that she hasn't made any changes to her diet, she continues to often miss breakfast and lunch. She states that she doesn't eat a lot. She is meeting with a weight management RD tomorrow. Her BG have been in goal. She lost her log book and isn't able to report exact numbers but gives an estimation.     Patient's most recent   Lab Results   Component Value Date    A1C 5.6 10/20/2021    A1C 5.0 03/02/2021    is meeting goal of <7.0    Diabetes knowledge and skills assessment:   Patient is knowledgeable in diabetes management concepts related to: none at this time, new dx    Continue education with the following diabetes management concepts: Healthy Eating, Being Active, Monitoring, Taking Medication, Problem Solving, Reducing Risks and Healthy Coping    Based on learning assessment above, most appropriate setting for further diabetes education would be: Individual setting.    INTERVENTIONS:    Education provided today on:  AADE Self-Care Behaviors:  Diabetes Pathophysiology  Being Active: relationship to blood glucose, describe appropriate activity program and precautions to take  Monitoring: purpose, proper technique, log and interpret results, individual blood glucose targets and frequency of monitoring  Taking Medication: action of prescribed medication, side effects of prescribed medications and when to take medications  Problem Solving: high blood glucose - causes, signs/symptoms, treatment and prevention and low blood glucose - causes, signs/symptoms, treatment and prevention  Reducing Risks: major complications of diabetes, prevention, early diagnostic measures and treatment of complications, foot care, appropriate dental care, annual eye exam, aspirin therapy, A1C - goals, relating to blood glucose levels, how often to  "check, lipids levels and goals and blood pressure and goals    Opportunities for ongoing education and support in diabetes-self management were discussed. Pt verbalized understanding of concepts discussed and recommendations provided today.       Education Materials Provided:  No new materials provided today    PLAN  You can cut back to 2 tablets of the Metformin and see if your headaches improve, then slowly increase back to 4 tablets  Topics to cover at upcoming visits: Problem Solving and Healthy Coping  Follow-up: scheduled for 1/18    See Goals Section for co-developed, patient-stated behavior change goals.  AVS provided to patient today.          SUBJECTIVE / OBJECTIVE:  Presents for: Follow-up  Accompanied by: Self  Diabetes education in the past 24mo: No  Focus of Visit: Monitoring,Taking Medication  Diabetes type: Type 2  Date of diagnosis: October 2021  Disease course: Stable  Difficulty affording diabetes medication?: No  Difficulty affording diabetes testing supplies?: No  Cultural Influences/Ethnic Background:  American    Diabetes Symptoms & Complications:  Fatigue: Yes  Neuropathy: Sometimes (since last April, since Covid dx)  Polydipsia: Yes  Polyphagia: Sometimes  Polyuria: No  Visual change: Yes  Slow healing wounds: Yes  Symptom course: Stable  Weight trend: Stable  Complications assessed today?: No    Patient Problem List and Family Medical History reviewed for relevant medical history, current medical status, and diabetes risk factors.    Vitals:  There were no vitals taken for this visit.  Estimated body mass index is 45.42 kg/m  as calculated from the following:    Height as of 12/1/21: 1.702 m (5' 7\").    Weight as of 12/1/21: 131.5 kg (290 lb).   Last 3 BP:   BP Readings from Last 3 Encounters:   11/16/21 (!) 120/90   10/01/21 120/86   08/13/21 104/70       History   Smoking Status     Current Every Day Smoker     Packs/day: 0.25     Years: 1.00     Types: Cigarettes     Start date: " 11/20/2016   Smokeless Tobacco     Never Used       Labs:  Lab Results   Component Value Date    A1C 5.6 10/20/2021    A1C 5.0 03/02/2021     Lab Results   Component Value Date     11/04/2021     06/11/2021     Lab Results   Component Value Date     10/22/2021     12/17/2020     HDL Cholesterol   Date Value Ref Range Status   12/17/2020 68 >49 mg/dL Final     Direct Measure HDL   Date Value Ref Range Status   10/22/2021 47 (L) >=50 mg/dL Final   ]  GFR Estimate   Date Value Ref Range Status   10/22/2021 88 >60 mL/min/1.73m2 Final     Comment:     As of July 11, 2021, eGFR is calculated by the CKD-EPI creatinine equation, without race adjustment. eGFR can be influenced by muscle mass, exercise, and diet. The reported eGFR is an estimation only and is only applicable if the renal function is stable.   06/11/2021 85 >60 mL/min/[1.73_m2] Final     Comment:     Non  GFR Calc  Starting 12/18/2018, serum creatinine based estimated GFR (eGFR) will be   calculated using the Chronic Kidney Disease Epidemiology Collaboration   (CKD-EPI) equation.       GFR Estimate If Black   Date Value Ref Range Status   06/11/2021 >90 >60 mL/min/[1.73_m2] Final     Comment:      GFR Calc  Starting 12/18/2018, serum creatinine based estimated GFR (eGFR) will be   calculated using the Chronic Kidney Disease Epidemiology Collaboration   (CKD-EPI) equation.       Lab Results   Component Value Date    CR 0.88 10/22/2021    CR 0.91 06/11/2021     No results found for: MICROALBUMIN    Healthy Eating:  Healthy Eating Assessed Today: Yes  Meals include: Breakfast,Lunch,Dinner  Breakfast: drinking water, not usually hungry in the morning OR protein shake - [employer]  Lunch: Bowl of Noodles OR sandwich - meat(turkey or chicken, alonso and mustard), with chips and pickle, sometimes with cucumbers OR skips  Dinner: chicken (usually drum sticks, leg quartesrs, chicken wings, chicken breast), starch  (macaroni cheese, potatoes) and vegetables (broccoli, peas, green beans, corn  Snacks: chips (1 bag/day) - hot cheetos or lays, pickles, occasional cookie or dessert, not much of a sweet tooth  Other: doesn't like yogurt and bananas. switched to diet pops  Beverages: Diet soda,Water,Energy drinks (crystal light flavoring.)  Has patient met with a dietitian in the past?: No    Being Active:  Being Active Assessed Today: Yes  Exercise:: Yes (walking, physical therapy once/week)  Days per week of moderate to strenuous exercise (like a brisk walk): 7  On average, minutes per day of exercise at this level: 10  Exercise Minutes per Week: 70    Monitoring:  Monitoring Assessed Today: Yes  Did patient bring glucose meter to appointment? : Yes    Glucose data:  BG between 126-140 mg/dl (before and after meals)      Taking Medications:  Diabetes Medication(s)     Biguanides       metFORMIN (GLUCOPHAGE-XR) 500 MG 24 hr tablet    Take 1 tablet (500 mg) by mouth daily (with dinner) for 7 days, THEN 1 tablet (500 mg) 2 times daily (with meals) for 7 days, THEN 2 tablets (1,000 mg) 2 times daily (with meals).          Taking Medication Assessed Today: Yes  Current Treatments: Oral Medication (taken by mouth)    Problem Solving:  Problem Solving Assessed Today: Yes  Is the patient at risk for hypoglycemia?: No  Is the patient at risk for DKA?: No    Reducing Risks:  Reducing Risks Assessed Today: Yes  Diabetes Risks: Sedentary Lifestyle,Ethnicity,Family History  CAD Risks: Diabetes Mellitus,Obesity,Sedentary lifestyle    Healthy Coping:  Healthy Coping Assessed Today: Yes  Patient Activation Measure Survey Score:  MARIAM Score (Last Two) 6/13/2019   MARIAM Raw Score 24   Activation Score 40.9   MARIAM Level 1     Janki Guerrero, RD, LD, CDE  Time Spent: 45 minutes  Encounter Type: Individual      Any diabetes medication dose changes were made via the Certified Diabetes Care & Education Protocol in collaboration with the patient's referring  provider. A copy of this encounter was shared with the provider.

## 2021-12-20 NOTE — PATIENT INSTRUCTIONS
Recommendations from today's MTM visit:                                                    MTM (medication therapy management) is a service provided by a clinical pharmacist designed to help you get the most of out of your medicines.   Today we reviewed what your medicines are for, how to know if they are working, that your medicines are safe and how to make your medicine regimen as easy as possible.      Finish up potassium chloride 20meq tablets and then switch to potassium chloride 10meq tablets and take 2 tablets twice daily   Can try ACT dry mouthwash or artifical saliva lozenge  Follow up with Dr. Torres about increase in the sounds you are hearing  Continue with Centrum multivitamin and discontinue prenatal vitamin and other multivitamin  Discontinue the over the counter B50 complex and continue the Bcomplex prescription from [facility]  Will talk with Joanne about starting Ozempic  Goal blood pressure is <140/90mmHg.    Follow-up: 2 weeks via [facility]    It was great to speak with you today.  I value your experience and would be very thankful for your time with providing feedback on our clinic survey. You may receive a survey via email or text message in the next few days.     To schedule another MTM appointment, please call the clinic directly or you may call the MTM scheduling line at [phone] or toll-free at [phone].     My Clinical Pharmacist's contact information:                                                      Please feel free to contact me with any questions or concerns you have.      Piper Schuler, Pharm.D, [facility]  Medication Therapy Management Pharmacist  [phone]

## 2021-12-20 NOTE — LETTER
12/20/2021         RE: Hilaria Bonner  [address]  [address]  [facility] [address]        Dear Colleague,    Thank you for referring your patient, Hilaria Bonner, to the [facility]. Please see a copy of my visit note below.    Diabetes Self-Management Education & Support    Presents for: Follow-up    Type of Visit: Telephone Visit    How would patient like to obtain AVS? MyChart    ASSESSMENT:    Hilaria states that she hasn't made any changes to her diet, she continues to often miss breakfast and lunch. She states that she doesn't eat a lot. She is meeting with a weight management RD tomorrow. Her BG have been in goal. She lost her log book and isn't able to report exact numbers but gives an estimation.     Patient's most recent   Lab Results   Component Value Date    A1C 5.6 10/20/2021    A1C 5.0 03/02/2021    is meeting goal of <7.0    Diabetes knowledge and skills assessment:   Patient is knowledgeable in diabetes management concepts related to: none at this time, new dx    Continue education with the following diabetes management concepts: Healthy Eating, Being Active, Monitoring, Taking Medication, Problem Solving, Reducing Risks and Healthy Coping    Based on learning assessment above, most appropriate setting for further diabetes education would be: Individual setting.    INTERVENTIONS:    Education provided today on:  AADE Self-Care Behaviors:  Diabetes Pathophysiology  Being Active: relationship to blood glucose, describe appropriate activity program and precautions to take  Monitoring: purpose, proper technique, log and interpret results, individual blood glucose targets and frequency of monitoring  Taking Medication: action of prescribed medication, side effects of prescribed medications and when to take medications  Problem Solving: high blood glucose - causes, signs/symptoms, treatment and prevention and low blood glucose - causes, signs/symptoms, treatment  "and prevention  Reducing Risks: major complications of diabetes, prevention, early diagnostic measures and treatment of complications, foot care, appropriate dental care, annual eye exam, aspirin therapy, A1C - goals, relating to blood glucose levels, how often to check, lipids levels and goals and blood pressure and goals    Opportunities for ongoing education and support in diabetes-self management were discussed. Pt verbalized understanding of concepts discussed and recommendations provided today.       Education Materials Provided:  No new materials provided today    PLAN  You can cut back to 2 tablets of the Metformin and see if your headaches improve, then slowly increase back to 4 tablets  Topics to cover at upcoming visits: Problem Solving and Healthy Coping  Follow-up: scheduled for 1/18    See Goals Section for co-developed, patient-stated behavior change goals.  AVS provided to patient today.          SUBJECTIVE / OBJECTIVE:  Presents for: Follow-up  Accompanied by: Self  Diabetes education in the past 24mo: No  Focus of Visit: Monitoring,Taking Medication  Diabetes type: Type 2  Date of diagnosis: October 2021  Disease course: Stable  Difficulty affording diabetes medication?: No  Difficulty affording diabetes testing supplies?: No  Cultural Influences/Ethnic Background:  American    Diabetes Symptoms & Complications:  Fatigue: Yes  Neuropathy: Sometimes (since last April, since Covid dx)  Polydipsia: Yes  Polyphagia: Sometimes  Polyuria: No  Visual change: Yes  Slow healing wounds: Yes  Symptom course: Stable  Weight trend: Stable  Complications assessed today?: No    Patient Problem List and Family Medical History reviewed for relevant medical history, current medical status, and diabetes risk factors.    Vitals:  There were no vitals taken for this visit.  Estimated body mass index is 45.42 kg/m  as calculated from the following:    Height as of 12/1/21: 1.702 m (5' 7\").    Weight as of 12/1/21: 131.5 " kg (290 lb).   Last 3 BP:   BP Readings from Last 3 Encounters:   11/16/21 (!) 120/90   10/01/21 120/86   08/13/21 104/70       History   Smoking Status     Current Every Day Smoker     Packs/day: 0.25     Years: 1.00     Types: Cigarettes     Start date: 11/20/2016   Smokeless Tobacco     Never Used       Labs:  Lab Results   Component Value Date    A1C 5.6 10/20/2021    A1C 5.0 03/02/2021     Lab Results   Component Value Date     11/04/2021     06/11/2021     Lab Results   Component Value Date     10/22/2021     12/17/2020     HDL Cholesterol   Date Value Ref Range Status   12/17/2020 68 >49 mg/dL Final     Direct Measure HDL   Date Value Ref Range Status   10/22/2021 47 (L) >=50 mg/dL Final   ]  GFR Estimate   Date Value Ref Range Status   10/22/2021 88 >60 mL/min/1.73m2 Final     Comment:     As of July 11, 2021, eGFR is calculated by the CKD-EPI creatinine equation, without race adjustment. eGFR can be influenced by muscle mass, exercise, and diet. The reported eGFR is an estimation only and is only applicable if the renal function is stable.   06/11/2021 85 >60 mL/min/[1.73_m2] Final     Comment:     Non  GFR Calc  Starting 12/18/2018, serum creatinine based estimated GFR (eGFR) will be   calculated using the Chronic Kidney Disease Epidemiology Collaboration   (CKD-EPI) equation.       GFR Estimate If Black   Date Value Ref Range Status   06/11/2021 >90 >60 mL/min/[1.73_m2] Final     Comment:      GFR Calc  Starting 12/18/2018, serum creatinine based estimated GFR (eGFR) will be   calculated using the Chronic Kidney Disease Epidemiology Collaboration   (CKD-EPI) equation.       Lab Results   Component Value Date    CR 0.88 10/22/2021    CR 0.91 06/11/2021     No results found for: MICROALBUMIN    Healthy Eating:  Healthy Eating Assessed Today: Yes  Meals include: Breakfast,Lunch,Dinner  Breakfast: drinking water, not usually hungry in the morning OR  protein shake - [employer]  Lunch: Bowl of Noodles OR sandwich - meat(turkey or chicken, alonso and mustard), with chips and pickle, sometimes with cucumbers OR skips  Dinner: chicken (usually drum sticks, leg quartesrs, chicken wings, chicken breast), starch (macaroni cheese, potatoes) and vegetables (broccoli, peas, green beans, corn  Snacks: chips (1 bag/day) - hot cheetos or lays, pickles, occasional cookie or dessert, not much of a sweet tooth  Other: doesn't like yogurt and bananas. switched to diet pops  Beverages: Diet soda,Water,Energy drinks (crystal light flavoring.)  Has patient met with a dietitian in the past?: No    Being Active:  Being Active Assessed Today: Yes  Exercise:: Yes (walking, physical therapy once/week)  Days per week of moderate to strenuous exercise (like a brisk walk): 7  On average, minutes per day of exercise at this level: 10  Exercise Minutes per Week: 70    Monitoring:  Monitoring Assessed Today: Yes  Did patient bring glucose meter to appointment? : Yes    Glucose data:  BG between 126-140 mg/dl (before and after meals)      Taking Medications:  Diabetes Medication(s)     Biguanides       metFORMIN (GLUCOPHAGE-XR) 500 MG 24 hr tablet    Take 1 tablet (500 mg) by mouth daily (with dinner) for 7 days, THEN 1 tablet (500 mg) 2 times daily (with meals) for 7 days, THEN 2 tablets (1,000 mg) 2 times daily (with meals).          Taking Medication Assessed Today: Yes  Current Treatments: Oral Medication (taken by mouth)    Problem Solving:  Problem Solving Assessed Today: Yes  Is the patient at risk for hypoglycemia?: No  Is the patient at risk for DKA?: No    Reducing Risks:  Reducing Risks Assessed Today: Yes  Diabetes Risks: Sedentary Lifestyle,Ethnicity,Family History  CAD Risks: Diabetes Mellitus,Obesity,Sedentary lifestyle    Healthy Coping:  Healthy Coping Assessed Today: Yes  Patient Activation Measure Survey Score:  MARIAM Score (Last Two) 6/13/2019   MARIAM Raw Score 24   Activation Score  40.9   MARIAM Level 1     Janki Guerrero, RD, LD, CDE  Time Spent: 45 minutes  Encounter Type: Individual      Any diabetes medication dose changes were made via the Certified Diabetes Care & Education Protocol in collaboration with the patient's referring provider. A copy of this encounter was shared with the provider.

## 2021-12-21 ENCOUNTER — TELEPHONE (OUTPATIENT)
Dept: ENDOCRINOLOGY | Facility: CLINIC | Age: 31
End: 2021-12-21

## 2021-12-21 ENCOUNTER — VIRTUAL VISIT (OUTPATIENT)
Dept: ENDOCRINOLOGY | Facility: CLINIC | Age: 31
End: 2021-12-21
Payer: COMMERCIAL

## 2021-12-21 DIAGNOSIS — E66.813 CLASS 3 SEVERE OBESITY DUE TO EXCESS CALORIES WITH SERIOUS COMORBIDITY AND BODY MASS INDEX (BMI) OF 45.0 TO 49.9 IN ADULT (H): ICD-10-CM

## 2021-12-21 DIAGNOSIS — E66.01 CLASS 3 SEVERE OBESITY DUE TO EXCESS CALORIES WITH SERIOUS COMORBIDITY AND BODY MASS INDEX (BMI) OF 45.0 TO 49.9 IN ADULT (H): ICD-10-CM

## 2021-12-21 DIAGNOSIS — Z98.84 S/P LAPAROSCOPIC SLEEVE GASTRECTOMY: ICD-10-CM

## 2021-12-21 DIAGNOSIS — Z71.3 NUTRITIONAL COUNSELING: ICD-10-CM

## 2021-12-21 DIAGNOSIS — Z71.3 NUTRITIONAL COUNSELING: Primary | ICD-10-CM

## 2021-12-21 PROCEDURE — 97803 MED NUTRITION INDIV SUBSEQ: CPT | Mod: 95 | Performed by: DIETITIAN, REGISTERED

## 2021-12-21 NOTE — LETTER
"12/21/2021       RE: Hilaria Bonner  [address]  [address]  [facility] [address]     Dear Colleague,    Thank you for referring your patient, Hilaria Bonner, to the [facility] [place] at [facility]. Please see a copy of my visit note below.    Hilaria Bonner is a 30 year old female who is being evaluated via a billable telephone visit.       The patient has been notified of following:      \"This telephone visit will be conducted via a call between you and your physician/provider. We have found that certain health care needs can be provided without the need for a physical exam.  This service lets us provide the care you need with a short phone conversation.  If a prescription is necessary we can send it directly to your pharmacy.  If lab work is needed we can place an order for that and you can then stop by our lab to have the test done at a later time.     Telephone visits are billed at different rates depending on your insurance coverage. During this emergency period, for some insurers they may be billed the same as an in-person visit.  Please reach out to your insurance provider with any questions.     If during the course of the call the physician/provider feels a telephone visit is not appropriate, you will not be charged for this service.\"     Patient has given verbal consent for Telephone visit?  Yes     What phone number would you like to be contacted at? [phone]     How would you like to obtain your AVS? MyChart     Phone call duration: 30 min     During this virtual visit the patient is located in [place], patient verifies this as the location during the entirety of this visit.         Reason For Visit:  Hilaria Bonner is a 30 year old female, completed a virtual visit today for nutrition follow-up s/p SG with Dr Lopez (3/26/19).  Patient referred by Joanne TOVAR.      Anthropometrics  Initial " "Consult Weight: 315.4 lbs  Day of Surgery Weight(3/26/19): 291.2 lbs     Estimated body mass index is 45.3 kg/m  as calculated from the following:    Height as of 12/1/21: 1.702 m (5' 7\").    Weight as of 12/20/21: 131.2 kg (289 lb 3.2 oz).     Current weight: 289 lbs from 12/20/21     Current Vitamins/Minerals:  Centrum MVI (no iron per pt, 0.8 mg Cu, 2.2 mg Thiamin, 18 mg Zinc, 6.4 mg B6)  Calcium Citrate  Vit C  Vitamin D3 (2000 international unit(s))  B-50 complex - will stop  B-complex prescribed by Joanne     Oct 13th - reports she is taking chewable Ca (not sure dose), Vit D3, MVI with iron, B-50 complex 1x/day, folate 1x/day, potassium chloride 2x/day, B12 injection (needs to schedule - had last on 9/3/21)  *RD forgot to check in on Vit C     Labs  BMP done 8/13  - electrolytes WNL    B6 done 6/11/21 - normal      A1C 3/2/21 - normal at 5.0, was 5.7 3 months prior to that      Mg 2/7/21 - normal      Vit D, Folate, Cu, Vit E, Vit B1, B12, B6 1/13/21 - all normal besides Cu slightly high, B1 slightly high, B6 slightly high     Ferritin 12/17/20 - normal     Lipids 12/17/20 - chol 200, LDL calculated 112     Vit A 12/17/20 - normal    12/21/21 - discontinue B50 complex, continue with bcomplex prescribed by Joanne. Sending in order for MVI with iron.     Nutrition History:      August 11 2021  Pt has not seen RD since 12/16/2020     No appetite for ~ 2 weeks, feeling full off of very little amounts     Recall:  Shrimp fried rice   Fluids: water (5-6 bottles)  Does not recall if she ate anything else     Has been trying to cut back on fluids, reports last RD told her she was drinking way too much . Was drinking around  oz. Reports she tries to stay hydrated for treatment and might have to get fluids in the ER if not staying hydrated. Encouraged pt to work on hydration as well as intake      Wanted to review foods she should/should not be eating. Wants to know what starchy vegetables are.     Reviewed " dietary recommendations/guidelines   - Pt recalls protein minimum   - Pt recalls  fluids from 30 min before/after but has not been doing  - Sugar/fat - pt did not remember, informed her of recommendations      Mailed following handouts:   Regular diet, protein sources, meal ideas, site with bariatric plates/utensils, dietary guidelines after bariatric surgery     Sept 13th   No major changes since last appt per pt.   Still struggling with appetite. Will notice an increase in appetite after treatment (infusions)     Some days (1-2x/week) will get super hungry and then eat fast. Taking another bite before first bite is even gone.     Recent recall  Protein shake in am  Chicken salad for lunch (forced self to eat) - got about 5-6 bites. Using baby spoon  Protein shake for dinner   Fluids: doing 64 oz/day,  from lunch time meal     Oct 13th   No major changes - continues with little to no appetite.   Pt thinks this may be due to her psych meds, recent increase in dose. Pt plans to discuss further with Brian at her appt on 11/29.      Common foods consumed: eggs, salmon,chicken, canned vegetables (corn, broccoli)     Recent recall:  Eating 2 meals/day on average. Having a protein shake if not having a meal ([employer] brand - high performance with 30 g protein, 1 g sugar)   Breakfast: eggs or protein shake  Lunch: sandwich - with turkey or chicken lunch meat, sometimes cheese, alonso and bread.   Dinner: 2 chicken drummies,1/2 scoop macaroni, 1/2 scoop green beans  *Using baby utensils/plates      Tolerating bread okay per pt. Discussed how toasting it or having an open face sandwich may be better tolerated since bread expands and is not tolerated as well after bariatric surgery.     Dec 2021:    Did not see pt last month. Had discussed follow up appt for Nov 30th in session but appt was never scheduled fully on RD's end.     Recent dx of type 2 diabetes per pt - fasting BG above 126.  Recently saw RD  for diabetes education on 11/24/21 and again yesterday, 12/21.  Started Metformin.   Pt had a couple questions about carb counting, answered.     Continues to have little appetite. Eating 1-2 meals/day. Will do protein shake if not having a meal. Doing Slimfast currently.  Eating beef stew    Doing well staying hydrated.     Reviewed supplements.     PREVIOUS GOALS:  1) Continue bariatric regular diet as tolerated. - Met     2) Aim for 3 meals per day.   - Continue with protein shake if not able to eat a meal - Met  - Consider having bread toasted or doing open face sandwich - Met, tried toasting it. Only able to eat 1/2 sandwich.      3) Consume 60 grams of protein/day.               - Try to protein foods first.      4) Sip on 64-96 oz of fluids/day- between meals only (do not drink 30 mins before or after meal or while you're eating)     4) Eat slowly (>20 min/meal), chewing foods well (to applesauce-like consistency).     5) Take supplements as prescribed. Set up appointment to get B12 injection for October and November. - Met     6) New scale     7) Talk with Dr. Torres about medications and appetite. - Met       Nutrition Prescription:  Grams Protein: 50-60 (minimum)  Amount of Fluid: 48-64 oz     Nutrition Diagnosis   Food and nutrition-related knowledge deficit r/t limited prior exposure to maintenance diet guidelines after bariatric surgery aeb pt unable to verbalize full understanding of maintenance diet guidelines post bariatric surgery.      Intervention  Mailed following handouts:   Assessed current dietary habits  Nutrition Education on Dietary recommendations   Coordination of care - AVS via mail  Answered pt questions     Goals:  1.  Multivitamin with iron. Can stop MVI without iron and transition to this one.     2. Stop B-50 complex, continue with prescribed supplements.     3. Sip on 64-96 oz of fluids/day- between meals only (do not drink 30 mins before or after meal or while you're  eating)    4. Eat slowly (>20 min/meal), chewing foods well (to applesauce-like consistency).    5. Continue to aim for 3 meals per day.   - Continue with protein shake if not able to eat a meal    4. Aim for  60 grams of protein/day.               - Try to protein foods first.      5. Schedule follow up appt for January      Diet Guidelines after Weight-loss Surgery  [email or web address]      Regular Foods/Meal Ideas  [email or web address]      Protein Sources   [email or web address]      Follow-Up:  monthly     Time spent with pt: 31 mins  SIL Kate, RD, LD

## 2021-12-21 NOTE — PATIENT INSTRUCTIONS
Goals:  1.  Multivitamin with iron. Can stop MVI without iron and transition to this one.     2. Stop B-50 complex, continue with prescribed supplements.     3. Sip on 64-96 oz of fluids/day- between meals only (do not drink 30 mins before or after meal or while you're eating)    4. Eat slowly (>20 min/meal), chewing foods well (to applesauce-like consistency).    5. Continue to aim for 3 meals per day.   - Continue with protein shake if not able to eat a meal    4. Aim for  60 grams of protein/day.               - Try to protein foods first.      5. Schedule follow up appt for January      Diet Guidelines after Weight-loss Surgery  [email or web address]      Regular Foods/Meal Ideas  [email or web address]      Protein Sources   [email or web address]      Follow-Up:  monthly    SIL Kate, RD, LD  Clinic #: [phone]

## 2021-12-22 ENCOUNTER — TELEPHONE (OUTPATIENT)
Dept: FAMILY MEDICINE | Facility: CLINIC | Age: 31
End: 2021-12-22

## 2021-12-22 ENCOUNTER — TELEPHONE (OUTPATIENT)
Dept: ENDOCRINOLOGY | Facility: CLINIC | Age: 31
End: 2021-12-22

## 2021-12-22 RX ORDER — POTASSIUM CHLORIDE 750 MG/1
20 TABLET, EXTENDED RELEASE ORAL 2 TIMES DAILY
Qty: 120 TABLET | Refills: 1 | Status: SHIPPED | OUTPATIENT
Start: 2021-12-22 | End: 2022-06-07

## 2021-12-22 NOTE — TELEPHONE ENCOUNTER
rosym to schedule 4 wee follow up with ABUNDIO GOMEZ, left call center phone number and sent mychart.

## 2021-12-22 NOTE — TELEPHONE ENCOUNTER
Called pt to reschedule her 1/18 b12 shot. Talked with pt on the phone and was able to reschedule.

## 2021-12-23 ENCOUNTER — TELEPHONE (OUTPATIENT)
Dept: ENDOCRINOLOGY | Facility: CLINIC | Age: 31
End: 2021-12-23

## 2021-12-23 NOTE — TELEPHONE ENCOUNTER
"Called pt for 10:30 am appt  No answer. LVM. Called 3x    RD and pt met Tuesday. RD reached out to scheduling to ask if today's appt was a mistake.     Amauri shared that \"I scheduled her for 01/21/2022 at first but then she called back and wanted to schedule for today as well. I asked her if she really wanted to because of just seeing you but she did want to schedule.\"    Encouraged pt to call clinic or send Jamplify[employer] message if she has a question or concern.    SIL Kate, RD, LD  Clinic #: [phone]  "

## 2021-12-28 ENCOUNTER — NURSE TRIAGE (OUTPATIENT)
Dept: FAMILY MEDICINE | Facility: CLINIC | Age: 31
End: 2021-12-28
Payer: COMMERCIAL

## 2021-12-28 NOTE — TELEPHONE ENCOUNTER
"Pt states she is newly dx with diabetes and states she has neuropathy. Pt reporting severe bilateral foot pain. Pt states there is a callus on plantar surface of rt foot near base of great toe, but there is black in the callus area. Pt requests a cane, and other tx plan for \"neuroapathy\" pain. RN advised reported sx need to be evaluated urgently and pt should seek care now. Pt states she will not go to urgent care or hospital, but usually uses [facility]. Pt requests to wait for an appointment with an available Primary Care provider. RN advised that severe pain, especially with reported black color, could indicate severe life or limb-threatening infection if not evaluated and treated urgently. RN advised that being diabetic makes the evaluation even more urgent. Pt states, \"I understand what you understand,\" but pt states she still will not seek care today. Pt requests to be transferred to  for an appointment tomorrow, and states if no appointments available tomorrow, she will go to urgent care tomorrow at 10am.    ANGE Rubalcava, RN    Reason for Disposition    Purple or black skin on foot or toe    SEVERE pain (e.g., excruciating, unable to do any normal activities)    Additional Information    Negative: Followed an ankle or foot injury    Negative: Ankle pain is the main symptom    Negative: Red area or streak and fever    Negative: Swollen foot and fever    Negative: Patient sounds very sick or weak to the triager    Negative: Entire foot is cool or blue in comparison to other foot    Answer Assessment - Initial Assessment Questions  1. ONSET: \"When did the pain start?\"       Bilateral foot pain, worsening    2. LOCATION: \"Where is the pain located?\"       Bilateral foot pain, and callus on plantar surface of rt foot at base of 1st toe is increasingly painful and has black color in the area at the bottom of foot.    3. PAIN: \"How bad is the pain?\"    (Scale 1-10; or mild, moderate, " "severe)    -  MILD (1-3): doesn't interfere with normal activities     -  MODERATE (4-7): interferes with normal activities (e.g., work or school) or awakens from sleep, limping     -  SEVERE (8-10): excruciating pain, unable to do any normal activities, unable to walk      \"100\" Pt states she doesn't want to use a walker and would like a cane for the foot pain that she attributes to neuropathy.    4. WORK OR EXERCISE: \"Has there been any recent work or exercise that involved this part of the body?\"       no     5. CAUSE: \"What do you think is causing the foot pain?\"      Pt thinks this is callus pain and neuropathy pain    6. OTHER SYMPTOMS: \"Do you have any other symptoms?\" (e.g., leg pain, rash, fever, numbness)      Neuropathy and callus and \"black\" by the callus.    Protocols used: FOOT PAIN-A-OH    "

## 2021-12-29 ENCOUNTER — HOME INFUSION (PRE-WILLOW HOME INFUSION) (OUTPATIENT)
Dept: PHARMACY | Facility: CLINIC | Age: 31
End: 2021-12-29

## 2021-12-29 ENCOUNTER — OFFICE VISIT (OUTPATIENT)
Dept: FAMILY MEDICINE | Facility: CLINIC | Age: 31
End: 2021-12-29
Payer: COMMERCIAL

## 2021-12-29 ENCOUNTER — ANCILLARY PROCEDURE (OUTPATIENT)
Dept: GENERAL RADIOLOGY | Facility: CLINIC | Age: 31
End: 2021-12-29
Attending: FAMILY MEDICINE
Payer: COMMERCIAL

## 2021-12-29 VITALS
TEMPERATURE: 97.9 F | RESPIRATION RATE: 18 BRPM | BODY MASS INDEX: 45.55 KG/M2 | HEART RATE: 110 BPM | HEIGHT: 67 IN | SYSTOLIC BLOOD PRESSURE: 120 MMHG | WEIGHT: 290.2 LBS | DIASTOLIC BLOOD PRESSURE: 84 MMHG | OXYGEN SATURATION: 98 %

## 2021-12-29 DIAGNOSIS — M21.42 ACQUIRED PES PLANUS OF BOTH FEET: ICD-10-CM

## 2021-12-29 DIAGNOSIS — M79.671 BILATERAL FOOT PAIN: Primary | ICD-10-CM

## 2021-12-29 DIAGNOSIS — M21.41 ACQUIRED PES PLANUS OF BOTH FEET: ICD-10-CM

## 2021-12-29 DIAGNOSIS — M79.671 BILATERAL FOOT PAIN: ICD-10-CM

## 2021-12-29 DIAGNOSIS — M79.672 BILATERAL FOOT PAIN: Primary | ICD-10-CM

## 2021-12-29 DIAGNOSIS — M79.672 BILATERAL FOOT PAIN: ICD-10-CM

## 2021-12-29 PROCEDURE — 73630 X-RAY EXAM OF FOOT: CPT | Mod: RT | Performed by: RADIOLOGY

## 2021-12-29 PROCEDURE — 99214 OFFICE O/P EST MOD 30 MIN: CPT | Performed by: FAMILY MEDICINE

## 2021-12-29 ASSESSMENT — MIFFLIN-ST. JEOR: SCORE: 2063.97

## 2021-12-29 ASSESSMENT — ENCOUNTER SYMPTOMS
FEVER: 0
JOINT SWELLING: 0

## 2021-12-29 NOTE — PROGRESS NOTES
Assessment & Plan     Bilateral foot pain  Subacute problem.  She has some symptoms suggestive of neuropathic pain and is currently following neurology and is on the maximum dose of neuropathic pain medications which I recommend she continues.  She was recommended by nursing triage to be urgently evaluated due to her comorbid diabetes, I currently see do not see any findings suggestive of cellulitis, osteomyelitis or diabetic foot wound. Given her concomitant pes planus and comorbid diabetes, subacute symptoms, we proceeded with x-ray imaging.  There was no evidence of acute fractures, dislocations, no obvious Lisfranc fx or charcot foot. recommend getting orthotics and have evaluation by podiatry  - XR Foot Bilateral G/E 3 Views  - Orthotics, Prosthetics and Custom Compression Order for DME - ONLY FOR DME  - Orthopedic  Referral    Acquired pes planus of both feet      BMI 45.0-49.9, adult (H)  Status post gastric bypass, current management by medical  weight management      Return in 1 month (on 1/29/2022) for diabetes with PCP.    Derik Go MD  [facility] is a 31 year old who presents for the following health issues     HPI     Patient is a 31-year-old with history of diabetes who presents with concerns of feet pain.  Ongoing for 6 months, worsening, back in June and July she had a an accidental slip/fall.    Now having more pain both feet and wants further evaluation.  States she has neuropathy and called nurse triage yesterday and was informed to come to our clinic today for further evaluation.  Pain achy, fusilli in both of the forefoot, worse with ambulation.  Associated burning numbness sensation.  No color change.    Follows with pain management.  On lyrica,     Review of Systems   Constitutional: Negative for fever.   Musculoskeletal: Negative for joint swelling.         Objective    /84   Pulse 110   Temp 97.9  F (36.6  C) (Oral)    "Resp 18   Ht 1.702 m (5' 7\")   Wt 131.6 kg (290 lb 3.2 oz)   SpO2 98%   BMI 45.45 kg/m    Body mass index is 45.45 kg/m .  Physical Exam  Musculoskeletal:        Feet:       Comments: Very low arches on both feet, normal range of motion of all toes, bilateral ankles.    Tenderness to palpation of both forefoot on the plantar aspect   Skin:     Findings: Lesion present.      Comments: All toes are cool to touch however no color change.  Normal capillary refills            Lab Results   Component Value Date    A1C 5.6 10/20/2021    A1C 5.0 03/02/2021    A1C 5.7 12/17/2020    A1C 5.2 03/18/2020    A1C 5.3 09/25/2019    A1C 5.3 01/12/2018     XR: \"Left foot: Mild pes planus. Normal joint spacing. No evidence of acute  fracture. Calcaneal spurring.     Right foot: No evidence of acute fracture. Mild pes planus. Mild  calcaneal spurring.\"            "

## 2021-12-29 NOTE — PATIENT INSTRUCTIONS
Patient Education     Peripheral Neuropathy  Peripheral neuropathy is the result of damage to the peripheral nerves. It usually affects the arms or legs, and causes a change in physical feeling. Sometimes it causes weakness in the muscles. You may feel tingling, numbness or shooting pains. Symptoms may be more common at night. Skin may be extra sensitive to light touch or temperature changes.  Neuropathy may be caused by a complication of a chronic disease such as diabetes, virus or bacterial infections, or physical injury. A ruptured disk with pressure on the spinal nerve may also lead to the problem. Certain vitamin deficiencies may also lead to it. It may also be caused by exposure to certain drugs or chemicals.  Home care    Tell the healthcare provider about all medicines you take. This includes prescription and over-the-counter medicines, vitamins, and herbs. Ask if any of the medicines may be causing your problems. Don't make any changes to prescription medicines without talking to your healthcare provider first.    You may be prescribed medicines to help relieve the tingling feeling or for pain. Take all medicines as directed.    A numb hand or foot may be more prone to injury. To help protect it:  ? Always use oven mitts.  ? Test water with an unaffected hand or foot.  ? Use caution when trimming nails. File sharp areas.  ? Wear shoes that fit well to avoid pressure points, blisters, and ulcers.  ? Inspect your hands and feet carefully (including the soles of your feet and between your toes) at least once a week. If you see red areas, sores, or other problems, tell your healthcare provider.    Follow-up care  Follow up with your doctor or as advised by our staff. You may need further testing or evaluation.  When to seek medical advice  Call your healthcare provider right away if any of the following occur:    Redness, swelling, cracking, or ulcer on any numb area, especially the feet    New symptoms of  numbness or muscle weakness numbness    Loss of bowel or bladder control    Slurred speech, confusion, or trouble speaking, walking, or seeing  Donay last reviewed this educational content on 3/1/2018    7780-3968 [employer]. All rights reserved. This information is not intended as a substitute for professional medical care. Always follow your healthcare professional's instructions.

## 2021-12-30 ENCOUNTER — HOME INFUSION (PRE-WILLOW HOME INFUSION) (OUTPATIENT)
Dept: PHARMACY | Facility: CLINIC | Age: 31
End: 2021-12-30
Payer: COMMERCIAL

## 2021-12-30 ENCOUNTER — DOCUMENTATION ONLY (OUTPATIENT)
Dept: PHARMACY | Facility: CLINIC | Age: 31
End: 2021-12-30
Payer: COMMERCIAL

## 2021-12-31 NOTE — PROGRESS NOTES
Skilled Nurse visit in the patient home to administer privigen infusion.  No recent elevated temperature, fever, chills, productive cough, coughing for 3 weeks or longer or hemoptysis, abnormal vital signs, night sweats, chest pain. No  decrease in your appetite, unexplained weight loss or fatigue.  No other new onset medical symptoms.  Current weight 290lb.  PIV placed L hand, 2 attempt/s.  Pre medicated with benadryl, tylenol and hydrocortisone. Infusion completed with/without complication or reaction. Pt reports therapy is beneficial in managing symptoms related to therapy.

## 2022-01-04 DIAGNOSIS — F29 PSYCHOSIS, UNSPECIFIED PSYCHOSIS TYPE (H): ICD-10-CM

## 2022-01-04 RX ORDER — PRAZOSIN HYDROCHLORIDE 1 MG/1
CAPSULE ORAL
Qty: 60 CAPSULE | Refills: 2 | Status: SHIPPED | OUTPATIENT
Start: 2022-01-04 | End: 2022-09-20

## 2022-01-04 NOTE — TELEPHONE ENCOUNTER
Behavioral Access, please schedule this patient for a future visit with  Brian . Patient is requesting a refill.       Date of Last Office Visit: 11/29/21. RTN in 3 mos  Date of Next Office Visit: None. Scheduling attempting to contact pt  No shows since last visit: 0  Cancellations since last visit: 0    Medication requested: Prazosin 1 mg Date last ordered: 10/6/21 Qty: 60 Refills: 2     prazosin (MINIPRESS) 1 MG capsule 60 capsule 2 10/6/2021  No   Sig - Route: Take 2 capsules (2 mg) by mouth At Bedtime - Oral       Review of [place] ?: NA    Lapse in medication adherence greater than 5 days?: no  If yes, call patient and gather details: NA  Medication refill request verified as identical to current order?: yes  Result of Last DAM, VPA, Li+ Level, CBC, or Carbamazepine Level (at or since last visit): N/A    Last visit treatment plan:     Plan:  I am going to increase the patient's Effexor XR to 187.5 mg a day.  Risks and benefits were discussed  Total time for chart review, patient interview and documentation was 23 minutes.  Patient should return to this clinic in 3 months for medication check       Warren Torres MD    []Medication refilled per  Medication Refill in Ambulatory Care  policy.  [x]Medication unable to be refilled by RN due to criteria not met as indicated below:    []Eligibility - not seen in the last year   [x]Supervision - no future appointment   []Compliance - no shows, cancellations or lapse in therapy   []Verification - order discrepancy   []Controlled medication   [x]Medication not included in policy   []90-day supply request   [x]Other : LPN is processing request

## 2022-01-05 ENCOUNTER — TELEPHONE (OUTPATIENT)
Dept: GASTROENTEROLOGY | Facility: CLINIC | Age: 32
End: 2022-01-05

## 2022-01-06 ENCOUNTER — PATIENT OUTREACH (OUTPATIENT)
Dept: NURSING | Facility: CLINIC | Age: 32
End: 2022-01-06
Payer: COMMERCIAL

## 2022-01-06 NOTE — PROGRESS NOTES
Clinic Care Coordination Contact    Community Health Worker Follow Up    Care Gaps:     Health Maintenance Due   Topic Date Due     ADVANCE CARE PLANNING  Never done     Pneumococcal Vaccine: Pediatrics (0 to 5 Years) and At-Risk Patients (6 to 64 Years) (1 of 2 - PPSV23) Never done     COVID-19 Vaccine (1) Never done     HEPATITIS B IMMUNIZATION (1 of 3 - Risk 3-dose series) Never done     EYE EXAM  01/04/2019     URINE DRUG SCREEN  08/20/2021     INFLUENZA VACCINE (1) 09/01/2021     PREVENTIVE CARE VISIT  09/30/2021     A1C  01/20/2022       Care Gaps Last addressed on 01/06/22 CHW advised patient she was due for physical and eye exam.    Goals:   Goals Addressed as of 1/6/2022 at 1:53 PM                    Today    11/29/21       Psychosocial (pt-stated)   10%  10%    Added 10/19/21 by Albino Pratt BSW      Goal Statement: I would like to move  Date Goal set: 10/19/2021  Barriers: Unsure what disability payments will be each month; Wants a three bedroom townhome or house to rent instead of an apartment- not sure if this will be financially feasible  Strengths: Patient is a great self advocate; Patient is enrolled in Care Coordination   Date to Achieve By: 2/2022  Patient expressed understanding of goal: Yes  Action steps to achieve this goal:  1. I will wait to see what my disability payments will be  2. I will review living options within my price range on "[employer]" once I get my monthly disability payments figured out  3. I will tour living options that are suitable for me and my children  4. I will move in to a new place.            CHW spoke patient she said she was doing good. She said she has looked into the [employer] once and plans to look again today. She said she just recently got her award letter and now knows her price range she can afford for housing. Patient is wondering once she finds a place if she cant afford deposit are there any programs that can help her.    CHW advised her CHW  can check with [facility] and get information. Patient said  She would be interested getting a c  she said she has been forgetting about appointments. CHW advised her to call the [facility] and they can assist her with this. She said she has a PCA worker coming 7 days a week for 2 hours a day.    CHW asked can PCA worker help with scheduling appointments? She said by the time they are done there is no time left for anything else. She said her aunt helps her sometimes. Patient wanted information about turning her dog into a service dog. She asked will see need a letter from a provider for this. CHW said they may want documentation from a provider but not sure.    CHW advised patient she was due for a physical and eye exam. She said she has an appointment with provider in February.   Patient did not have any new questions or concerns at this time.    Intervention and Education during outreach: CHW will send information about a service animal and county information.    CHW Plan: CHW will call in 1 month.    SANGEETA Young  Clinic Care Coordination  [facility] Clinics: [place], [place] & [place]  Phone: [phone]

## 2022-01-07 NOTE — TELEPHONE ENCOUNTER
See telephone encounter 11-22-21.    Patient was made aware of the information below via that encounter.  Fiorella ZUNIGA MSN, RN

## 2022-01-10 ENCOUNTER — OFFICE VISIT (OUTPATIENT)
Dept: OPTOMETRY | Facility: CLINIC | Age: 32
End: 2022-01-10
Attending: PHYSICIAN ASSISTANT
Payer: COMMERCIAL

## 2022-01-10 DIAGNOSIS — H52.13 MYOPIA OF BOTH EYES: ICD-10-CM

## 2022-01-10 DIAGNOSIS — E11.65 TYPE 2 DIABETES MELLITUS WITH HYPERGLYCEMIA, WITHOUT LONG-TERM CURRENT USE OF INSULIN (H): Primary | ICD-10-CM

## 2022-01-10 PROCEDURE — 92015 DETERMINE REFRACTIVE STATE: CPT | Performed by: OPTOMETRIST

## 2022-01-10 PROCEDURE — 92004 COMPRE OPH EXAM NEW PT 1/>: CPT | Performed by: OPTOMETRIST

## 2022-01-10 ASSESSMENT — SLIT LAMP EXAM - LIDS
COMMENTS: NORMAL
COMMENTS: NORMAL

## 2022-01-10 ASSESSMENT — REFRACTION_MANIFEST
OS_SPHERE: -0.50
OS_CYLINDER: SPHERE
OD_CYLINDER: SPHERE
OD_SPHERE: -0.75

## 2022-01-10 ASSESSMENT — TONOMETRY
IOP_METHOD: ICARE
OD_IOP_MMHG: 11
OS_IOP_MMHG: 12

## 2022-01-10 ASSESSMENT — VISUAL ACUITY
OD_SC: 20/25
METHOD: SNELLEN - LINEAR
OS_SC+: -2
OS_SC: 20/20
OD_SC+: +1

## 2022-01-10 ASSESSMENT — EXTERNAL EXAM - RIGHT EYE: OD_EXAM: NORMAL

## 2022-01-10 ASSESSMENT — CONF VISUAL FIELD
METHOD: COUNTING FINGERS
OD_NORMAL: 1
OS_NORMAL: 1

## 2022-01-10 ASSESSMENT — CUP TO DISC RATIO
OS_RATIO: 0.3
OD_RATIO: 0.2

## 2022-01-10 ASSESSMENT — EXTERNAL EXAM - LEFT EYE: OS_EXAM: NORMAL

## 2022-01-10 NOTE — LETTER
1/10/2022         RE: Hilaria Bonner  [address]  [address]  [facility] [address]        Dear Colleague,    Thank you for referring your patient, Hilaria Bonner, to the [facility]. Please see a copy of my visit note below.    Assessment/Plan  (E11.65) Type 2 diabetes mellitus with hyperglycemia, without long-term current use of insulin (H)  (primary encounter diagnosis)  Comment: Without retinopathy  Plan: Discussed findings with patient. Encouraged continued blood glucose, pressure, and lipid control. Patient should continue following recommendations of primary care provider. Patient should plan on returning to clinic annually for a dilated eye exam but was encouraged to return to clinic sooner with new flashes/floaters or other vision changes.     (H52.13) Myopia of both eyes  Plan: REFRACTION [[id number]]        Discussed findings with patient. New spectacle prescription dispensed to patient. Patient is welcome to return to clinic with prolonged adaptation difficulties.         Complete documentation of historical and exam elements from today's encounter can  be found in the full encounter summary report (not reduplicated in this progress  note). I personally obtained the chief complaint(s) and history of present illness. I  confirmed and edited as necessary the review of systems, past medical/surgical  history, family history, social history, and examination findings as documented by  others; and I examined the patient myself. I personally reviewed the relevant tests,  images, and reports as documented above. I formulated and edited as necessary the  assessment and plan and discussed the findings and management plan with the  patient and family.    Horacio Camarena OD       Again, thank you for allowing me to participate in the care of your patient.        Sincerely,        Horacio Camarena OD

## 2022-01-10 NOTE — PROGRESS NOTES
Assessment/Plan  (E11.65) Type 2 diabetes mellitus with hyperglycemia, without long-term current use of insulin (H)  (primary encounter diagnosis)  Comment: Without retinopathy  Plan: Discussed findings with patient. Encouraged continued blood glucose, pressure, and lipid control. Patient should continue following recommendations of primary care provider. Patient should plan on returning to clinic annually for a dilated eye exam but was encouraged to return to clinic sooner with new flashes/floaters or other vision changes.     (H52.13) Myopia of both eyes  Plan: REFRACTION [[id number]]        Discussed findings with patient. New spectacle prescription dispensed to patient. Patient is welcome to return to clinic with prolonged adaptation difficulties.         Complete documentation of historical and exam elements from today's encounter can  be found in the full encounter summary report (not reduplicated in this progress  note). I personally obtained the chief complaint(s) and history of present illness. I  confirmed and edited as necessary the review of systems, past medical/surgical  history, family history, social history, and examination findings as documented by  others; and I examined the patient myself. I personally reviewed the relevant tests,  images, and reports as documented above. I formulated and edited as necessary the  assessment and plan and discussed the findings and management plan with the  patient and family.    Horacio Camarena OD

## 2022-01-10 NOTE — NURSING NOTE
Chief Complaints and History of Present Illnesses   Patient presents with     Diabetic Eye Exam       Chief Complaint(s) and History of Present Illness(es)     Diabetic Eye Exam     Associated symptoms: blurred vision, double vision and tearing    Diabetes Type: Type 2              Comments     Patient here for annual diabetic eye exam. Has RX glasses worn mostly for driving at night, does not have them with for the apt today. Notes her vision has been getting worse. States she does get double vision throughout the day, images are always side by side. Unsure if the double vision is at near, distance or both.   Eyes have been dry, no drops used because her eyes water so much they can look like she is crying.   No Pain, No Flashes, No Floaters.     Diabetic, Type 2  Lab Results       Component                Value               Date                       A1C                      5.6                 10/20/2021                 A1C                      5.0                 03/02/2021                 A1C                      5.7                 12/17/2020                 A1C                      5.2                 03/18/2020                 A1C                      5.3                 09/25/2019                 A1C                      5.3                 01/12/2018                          Alan Robb, Ophthalmic Assistant

## 2022-01-12 ENCOUNTER — OFFICE VISIT (OUTPATIENT)
Dept: PODIATRY | Facility: CLINIC | Age: 32
End: 2022-01-12
Attending: FAMILY MEDICINE
Payer: COMMERCIAL

## 2022-01-12 VITALS
HEART RATE: 90 BPM | DIASTOLIC BLOOD PRESSURE: 90 MMHG | SYSTOLIC BLOOD PRESSURE: 140 MMHG | BODY MASS INDEX: 45.42 KG/M2 | WEIGHT: 290 LBS

## 2022-01-12 DIAGNOSIS — M79.5 RESIDUAL FOREIGN BODY IN SOFT TISSUE: ICD-10-CM

## 2022-01-12 DIAGNOSIS — M21.6X1 PRONATION DEFORMITY OF BOTH FEET: ICD-10-CM

## 2022-01-12 DIAGNOSIS — M79.672 BILATERAL FOOT PAIN: ICD-10-CM

## 2022-01-12 DIAGNOSIS — M79.671 BILATERAL FOOT PAIN: ICD-10-CM

## 2022-01-12 DIAGNOSIS — M21.6X2 PRONATION DEFORMITY OF BOTH FEET: ICD-10-CM

## 2022-01-12 DIAGNOSIS — E11.65 TYPE 2 DIABETES MELLITUS WITH HYPERGLYCEMIA, WITHOUT LONG-TERM CURRENT USE OF INSULIN (H): Primary | ICD-10-CM

## 2022-01-12 DIAGNOSIS — L84 CALLUS: ICD-10-CM

## 2022-01-12 PROCEDURE — 99202 OFFICE O/P NEW SF 15 MIN: CPT | Mod: 25 | Performed by: PODIATRIST

## 2022-01-12 PROCEDURE — 10120 INC&RMVL FB SUBQ TISS SMPL: CPT | Performed by: PODIATRIST

## 2022-01-12 PROCEDURE — 11056 PARNG/CUTG B9 HYPRKR LES 2-4: CPT | Mod: 59 | Performed by: PODIATRIST

## 2022-01-12 RX ORDER — AMMONIUM LACTATE 12 G/100G
CREAM TOPICAL 2 TIMES DAILY
Qty: 385 G | Refills: 4 | Status: SHIPPED | OUTPATIENT
Start: 2022-01-12 | End: 2022-12-08
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
## 2022-01-12 NOTE — PATIENT INSTRUCTIONS
"We wish you continued good healing. If you have any questions or concerns, please do not hesitate to contact us at  [phone]    Matomy Markett (secure e-mail communication and access to your chart) to send a message or to make an appointment.    Please remember to call and schedule a follow up appointment if one was recommended at your earliest convenience.     PODIATRY CLINIC HOURS  TELEPHONE NUMBER    Dr. Chandrakant Blake D.P.M [facility]        Clinics:  Sam Delgado CMA   Tuesday 1PM-6PM  [place]  Wednesday 745AM-330PM  [place]  Thursday/Friday 745AM-230PM  [place] APPOINTMENTS  [phone]    [place] APPOINTMENTS  [phone]          If you need a medication refill, please contact us you may need lab work and/or a follow up visit prior to your refill (i.e. Antifungal medications).    If MRI needed please call Imaging at [phone] or [phone]    HOW DO I GET MY KNEE SCOOTER? Knee scooters can be picked up at ANY Medical Supply stores with your knee scooter Prescription.  OR    Bring your signed prescription to an [facility] showroom.          [employer].........................[phone]  They travel to patients homes as well as the following community/Senior Centers.   Need to call [employer] to set up appointments. For in home or Centers.    Senior Centers:    [facility]..............[phone]    [place]................[phone]    [place]................[phone]    [place]............................[phone]    [facility].................[phone]      ** YOU MUST CALL FOR INFORMATION ON DAYS, TIMES AND COST OF SERVICE**      For up to date list of Foot Care Rn's. Visit the website    [employer] website    Afcna.org    Click on the \"Find a foot care provider\" Link    Ashley Leung RN,[facility] " [phone] (Text or call) Has limited home visit.    Coby Mejia,RN,[facility] [phone]    Carine [phone]    Radha Marquis,RN,BSN,[facility] [phone]    Radha Camarena [phone]    Alexsandra Farrell [phone]    Jaylene Lawson [phone]    Michelle [phone]    **THIS IS A PRIVATE PAY SERVICE- NOT BILLABLE TO MEDICARE OR INSURANCE**
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
## 2022-01-17 ENCOUNTER — TELEPHONE (OUTPATIENT)
Dept: CARDIOLOGY | Facility: CLINIC | Age: 32
End: 2022-01-17
Payer: COMMERCIAL

## 2022-01-17 NOTE — TELEPHONE ENCOUNTER
RN called Mell and left detailed VM for her with recommendations below. RN advised her in VM to call this RN back with any further questions.     Dr. Jones's recommendations    Resting HR up to 115 OK   Exercise will help drive down her resting heart rate so do not want to cancel it     Thanks!   Dr. Jones

## 2022-01-17 NOTE — TELEPHONE ENCOUNTER
VM received from TRISTAN Delgado at [facility], stating that the patient started PT aquatics last week. During her evaluation, patient's resting HR was in the 110s. They were able to get it down to 97 BPM, but otherwise patient's resting HR were in the 100s-110s the entire session. They were not able to proceed with exercise due to high resting HR. Sandy needs resting HR parameters and exercise HR parameters due to patient's resting HR being >100 BPM. Sandy requesting a reply before patient's session tomorrow 1/18. Will route to Dr. Jones for review.     Last OV 12/11/20 with Dr. Jones:    ASSESSMENT:      Hilaria Bonner is a 29 year old female with recent hx of massive PE with PEA arrest who is here for follow up of chronic PE management. Patient still has some residual dyspnea which is probably deconditioning as RV function normalized as did PA pressures, and no residual clot by CTPE to suggest CTEPH or RPVO.      In regards to possible underlining cause of PE, Ms. Bonner has multiple risk factors (smoking, obesity, hormonal birth control and family hx), given the extent of her embolism and  possible familial coagulopathy we would recommend life long anticoagulation at this time.      LE doppler negative for residual DVT, but will need follow up for PSA. COVID in April with some chest pain, arm tingling ongoing.      PLAN:     -- Repeated CTPE was negative for residual clot to suggest RPVO or CTEPH, no need to repeat or obtain VQ scan at this time unless increased PA pressures in future  -- 6MWT done for baseline  -- Repeat US right lower extremity to evaluate for resolution of pseudoaneurysm  -- OK for rivaroxaban switch, treat indefinitely  -- Ziopatch for palpitations showed small run of NSVT - no need to repeat for now  -- EKG to evaluate for tachycardia and chest pain, RN visit + blood pressure and vitals   -- 6 month follow up with me      Yelena Jones MD MSc  [facility]

## 2022-01-18 ENCOUNTER — PATIENT OUTREACH (OUTPATIENT)
Dept: CARE COORDINATION | Facility: CLINIC | Age: 32
End: 2022-01-18

## 2022-01-18 ENCOUNTER — TELEPHONE (OUTPATIENT)
Dept: FAMILY MEDICINE | Facility: CLINIC | Age: 32
End: 2022-01-18

## 2022-01-18 ENCOUNTER — HOME INFUSION (PRE-WILLOW HOME INFUSION) (OUTPATIENT)
Dept: PHARMACY | Facility: CLINIC | Age: 32
End: 2022-01-18

## 2022-01-18 ENCOUNTER — ALLIED HEALTH/NURSE VISIT (OUTPATIENT)
Dept: FAMILY MEDICINE | Facility: CLINIC | Age: 32
End: 2022-01-18
Payer: COMMERCIAL

## 2022-01-18 ENCOUNTER — VIRTUAL VISIT (OUTPATIENT)
Dept: EDUCATION SERVICES | Facility: CLINIC | Age: 32
End: 2022-01-18
Payer: COMMERCIAL

## 2022-01-18 DIAGNOSIS — E11.9 TYPE 2 DIABETES MELLITUS WITHOUT COMPLICATION, WITHOUT LONG-TERM CURRENT USE OF INSULIN (H): Primary | ICD-10-CM

## 2022-01-18 DIAGNOSIS — E53.8 VITAMIN B12 DEFICIENCY (NON ANEMIC): Primary | ICD-10-CM

## 2022-01-18 DIAGNOSIS — Z23 NEEDS FLU SHOT: ICD-10-CM

## 2022-01-18 PROCEDURE — 96372 THER/PROPH/DIAG INJ SC/IM: CPT | Performed by: PHYSICIAN ASSISTANT

## 2022-01-18 PROCEDURE — 98967 PH1 ASSMT&MGMT NQHP 11-20: CPT | Mod: 95 | Performed by: DIETITIAN, REGISTERED

## 2022-01-18 PROCEDURE — 90471 IMMUNIZATION ADMIN: CPT

## 2022-01-18 PROCEDURE — 90686 IIV4 VACC NO PRSV 0.5 ML IM: CPT

## 2022-01-18 PROCEDURE — 99207 PR NO CHARGE NURSE ONLY: CPT

## 2022-01-18 RX ADMIN — CYANOCOBALAMIN 1000 MCG: 1000 INJECTION, SOLUTION INTRAMUSCULAR; SUBCUTANEOUS at 11:44
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
## 2022-01-18 NOTE — PROGRESS NOTES
Clinic Care Coordination Contact    Situation: Patient chart reviewed by care coordinator.    Background: Patient is enrolled in Care Coordination. CHW and [facility] are following.     Assessment: CHW reached out to patient on 1/6. Patient has reviewed [employer] and plans to continue looking in to housing options from this site. She stated that she recently got her aware letter and now knows what she can afford. She asked if there were any resources to help afford the deposit. CHW reached out to [facility]. [facility] provided resource information for this, as well as for a service dog, as patient requested these resources as well. PCA worker is coming 7 days a week for 2hrs/day.     Plan/Recommendations: CHW plans to contact patient in one month. [facility] will review chart in 6 weeks, per standard workflow, unless involvement is requested sooner    IVET Bowens  Primary Care Clinic- Social Work Care Coordinator  [facility]  Ph: [phone]  1/18/2022 12:09 PM

## 2022-01-18 NOTE — TELEPHONE ENCOUNTER
Reason for Call:  Form, our goal is to have forms completed with 72 hours, however, some forms may require a visit or additional information.    Type of letter, form or note:  orders    Who is the form from?: [employer]  (if other please explain)    Where did the form come from: form was faxed in    What clinic location was the form placed at?: [facility]     Where the form was placed: [facility]/Folder    What number is listed as a contact on the form?: [phone]       Additional comments: please sign/date and fax to [phone]    Call taken on 1/18/2022 at 1:29 PM by Naye Alexander

## 2022-01-18 NOTE — PROGRESS NOTES
Clinic Administered Medication Documentation          Injectable Medication Documentation    Patient was given Cyanocobalamin (B-12). Prior to medication administration, verified patients identity using patient s name and date of birth. Please see MAR and medication order for additional information. Patient instructed to stay in clinic after the injection but patient declined.      Was entire vial of medication used? Yes  Vial/Syringe: Single dose vial  Expiration Date:  12/2022  Was this medica/efra/n supplied by the patient? No    Prior to immunization administration, verified patients identity using patient s name and date of birth. Please see Immunization Activity for additional information.     Screening Questionnaire for Adult Immunization    Are you sick today?   No   Do you have allergies to medications, food, a vaccine component or latex?   No   Have you ever had a serious reaction after receiving a vaccination?   No   Do you have a long-term health problem with heart, lung, kidney, or metabolic disease (e.g., diabetes), asthma, a blood disorder, no spleen, complement component deficiency, a cochlear implant, or a spinal fluid leak?  Are you on long-term aspirin therapy?   No   Do you have cancer, leukemia, HIV/AIDS, or any other immune system problem?   No   Do you have a parent, brother, or sister with an immune system problem?   No   In the past 3 months, have you taken medications that affect  your immune system, such as prednisone, other steroids, or anticancer drugs; drugs for the treatment of rheumatoid arthritis, Crohn s disease, or psoriasis; or have you had radiation treatments?   No   Have you had a seizure, or a brain or other nervous system problem?   No   During the past year, have you received a transfusion of blood or blood    products, or been given immune (gamma) globulin or antiviral drug?   No   For women: Are you pregnant or is there a chance you could become       pregnant during the  Pt arrived to floor from IR with RN s/p Biliary Tube check. VSS. Pt denies pain. Tolerating regular diet. Plan discharge 1030 per MD orders.    next month?   No   Have you received any vaccinations in the past 4 weeks?   No     Immunization questionnaire answers were all negative.      Patient instructed to remain in clinic for 15 minutes afterwards, and to report any adverse reaction to me immediately.       Screening performed by TEN Payne on 1/18/2022 at 11:47 AM.

## 2022-01-19 ENCOUNTER — HOME INFUSION (PRE-WILLOW HOME INFUSION) (OUTPATIENT)
Dept: PHARMACY | Facility: CLINIC | Age: 32
End: 2022-01-19

## 2022-01-20 ENCOUNTER — HOME INFUSION (PRE-WILLOW HOME INFUSION) (OUTPATIENT)
Dept: PHARMACY | Facility: CLINIC | Age: 32
End: 2022-01-20

## 2022-01-20 ENCOUNTER — DOCUMENTATION ONLY (OUTPATIENT)
Dept: PHARMACY | Facility: CLINIC | Age: 32
End: 2022-01-20
Payer: COMMERCIAL

## 2022-01-20 NOTE — PROGRESS NOTES
Skilled Nurse visit in the  patient home/I Infusion Suite to administer Privigen 60 g in 600 ml.  No recent elevated temperature, fever, chills, productive cough, coughing for 3 weeks or longer or hemoptysis, abnormal vital signs, night sweats, chest pain. No  decrease in your appetite, unexplained weight loss or fatigue.  No other new onset medical symptoms.  Current weight 290 lbs.  PIV placed right hand, 2 attempt/s.  Pre medicated with hydrocortisone 100 mg IV, benadryl 50 mg PO, acetaminophen 650 mg PO. Labs drawn none. Infusion completed without complication or reaction. Pt reports therapy is effective in managing symptoms related to therapy.    Kristi Wilson RN BSN  [facility]  Email: [email or web address]  Phone: [phone]

## 2022-01-21 ENCOUNTER — TELEPHONE (OUTPATIENT)
Dept: ENDOCRINOLOGY | Facility: CLINIC | Age: 32
End: 2022-01-21

## 2022-01-21 NOTE — TELEPHONE ENCOUNTER
calcium Citrate-vitamin D 500-400 MG-UNIT CHEW  Last Written Prescription Date:  12-1-21  Last Fill Quantity: 90,   # refills: 11  Last Office Visit : 12-1-21  Future Office visit:  3-10-22    Routing refill request to provider for review/approval because:  Fax from pharmacy: pt requesting tabs, not chewable    Requesting :Calcium Carb-Cholecalciferol (CALCIUM 500 +D) 500-400 MG-UNIT TABS

## 2022-01-21 NOTE — TELEPHONE ENCOUNTER
Called patient in regard to appointment today.    Pt reports not feeling well, she would like to reschedule appointment. PT will call clinic when she is feeling better.     SIL Kate, RD, LD  Clinic #: [phone]

## 2022-01-21 NOTE — TELEPHONE ENCOUNTER
Called and spoke with pharmacy in regards to med change request from chewables to tablets of calcium citrate vitamin D. S/P bariatric surgery. Informed pharmacist of this. Prescription is not covered by insurance, cost is $11. Explained that tablet form can be sent if patient prefers, though chewable is recommended.

## 2022-01-29 ENCOUNTER — HEALTH MAINTENANCE LETTER (OUTPATIENT)
Age: 32
End: 2022-01-29

## 2022-02-02 ENCOUNTER — VIRTUAL VISIT (OUTPATIENT)
Dept: BEHAVIORAL HEALTH | Facility: CLINIC | Age: 32
End: 2022-02-02
Payer: COMMERCIAL

## 2022-02-02 DIAGNOSIS — F41.1 GAD (GENERALIZED ANXIETY DISORDER): ICD-10-CM

## 2022-02-02 DIAGNOSIS — F29 PSYCHOSIS, UNSPECIFIED PSYCHOSIS TYPE (H): ICD-10-CM

## 2022-02-02 PROCEDURE — 99214 OFFICE O/P EST MOD 30 MIN: CPT | Mod: 95 | Performed by: PSYCHIATRY & NEUROLOGY

## 2022-02-02 RX ORDER — OLANZAPINE 10 MG/1
10 TABLET ORAL AT BEDTIME
Qty: 30 TABLET | Refills: 3 | Status: ON HOLD | OUTPATIENT
Start: 2022-02-02 | End: 2022-08-09

## 2022-02-02 RX ORDER — VENLAFAXINE HYDROCHLORIDE 75 MG/1
225 CAPSULE, EXTENDED RELEASE ORAL DAILY
Qty: 90 CAPSULE | Refills: 3 | Status: SHIPPED | OUTPATIENT
Start: 2022-02-02 | End: 2022-06-09

## 2022-02-02 RX ORDER — VENLAFAXINE HYDROCHLORIDE 37.5 MG/1
37.5 CAPSULE, EXTENDED RELEASE ORAL DAILY
Qty: 150 CAPSULE | Refills: 2 | Status: CANCELLED | OUTPATIENT
Start: 2022-02-02

## 2022-02-02 NOTE — PROGRESS NOTES
This video/telephone visit will be conducted via a call between you and your physician/provider. We have found that certain health care needs can be provided without the need for an in-person physical exam. This service lets us provide the care you need with a video /telephone conversation. If a prescription is necessary we can send it directly to your pharmacy. If lab work is needed we can place an order for that and you can then stop by our lab to have the test done at a later time.    Just as we bill insurance for in-person visits, we also bill insurance for video/telephone visits. If you have questions about your insurance coverage, we recommend that you speak with your insurance company.    Patient has given verbal consent for video/Telephone visit? Yes    Patient would like telephone visit, please connect : [phone]  Reason for visit: Medication refills  Patient verified allergies, medications and pharmacy via telephone.     MELODY-7 scores:    MELODY-7 SCORE 7/22/2021 9/1/2021   Total Score 11 (moderate anxiety) 15 (severe anxiety)   Total Score 11 15       PHQ-9 scores:   PHQ-9 SCORE 8/13/2021 9/1/2021   PHQ-9 Total Score MyChart 19 (Moderately severe depression) 20 (Severe depression)   PHQ-9 Total Score 19 20   PHQ-A Total Score - -     Patient states she  is ready for visit.  MN  to be reviewed by provider.   Medication refill is pended for review and approval by provider.      Maddy Arana, JIM February 2, 2022 1:51 PM
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
## 2022-02-02 NOTE — PATIENT INSTRUCTIONS
The patient continues to follow-up with her medical team regarding her diabetes management.  Apparently she has had some variable compliance with her metformin.  I have elected to increase her Effexor XR to 225 mg a day.  Risks and benefits were discussed.  I will make no change in the olanzapine at this time.  She should return to this clinic for a follow-up in 3 months.  I have ordered her to start with an individual therapist and she is agreed to do that.

## 2022-02-03 DIAGNOSIS — G62.9 POLYNEUROPATHY: ICD-10-CM

## 2022-02-03 RX ORDER — PREGABALIN 200 MG/1
200 CAPSULE ORAL 3 TIMES DAILY
Qty: 270 CAPSULE | Refills: 0 | Status: SHIPPED | OUTPATIENT
Start: 2022-02-03 | End: 2022-07-26

## 2022-02-03 NOTE — TELEPHONE ENCOUNTER
Routing refill request to provider for review/approval because:  Drug not on the G refill protocol.    Requested Prescriptions   Pending Prescriptions Disp Refills    Pregabalin (LYRICA) 200 MG capsule 90 capsule 3     Sig: Take 1 capsule (200 mg) by mouth 3 times daily        There is no refill protocol information for this order         Tasha Nolan RN, BSN  [facility]

## 2022-02-03 NOTE — PROGRESS NOTES
Medications Phoned  to Pharmacy [] yes []no     Medications ordered this visit were e-scribed.  Verified by order class [x] yes  [] no  List medications sent to pharmacy: Olanzapine 10mg, venlafaxine 75mg  Medication changes or discontinuations were communicated to patient's pharmacy: [] yes  [x] no     Dictation completed at time of chart check: [x] yes  [] no     I have checked the documentation for today s encounters and the above information has been reviewed and completed.        Maddy Arana CMA February 3, 2022 9:17 AM

## 2022-02-03 NOTE — TELEPHONE ENCOUNTER
[facility] med refilled for 90 days

## 2022-02-07 ENCOUNTER — HOME INFUSION (PRE-WILLOW HOME INFUSION) (OUTPATIENT)
Dept: PHARMACY | Facility: CLINIC | Age: 32
End: 2022-02-07

## 2022-02-07 DIAGNOSIS — Z98.84 S/P LAPAROSCOPIC SLEEVE GASTRECTOMY: ICD-10-CM

## 2022-02-07 RX ORDER — CHOLECALCIFEROL (VITAMIN D3) 50 MCG
1 TABLET ORAL DAILY
Qty: 30 TABLET | Refills: 1 | Status: SHIPPED | OUTPATIENT
Start: 2022-02-07 | End: 2022-02-15

## 2022-02-08 ENCOUNTER — PATIENT OUTREACH (OUTPATIENT)
Dept: CARE COORDINATION | Facility: CLINIC | Age: 32
End: 2022-02-08

## 2022-02-08 ENCOUNTER — PATIENT OUTREACH (OUTPATIENT)
Dept: NURSING | Facility: CLINIC | Age: 32
End: 2022-02-08
Payer: COMMERCIAL

## 2022-02-08 ENCOUNTER — HOME INFUSION (PRE-WILLOW HOME INFUSION) (OUTPATIENT)
Dept: PHARMACY | Facility: CLINIC | Age: 32
End: 2022-02-08

## 2022-02-08 NOTE — PROGRESS NOTES
This is a recent snapshot of the patient's [facility] medical record.  For current drug dose and complete information and questions, call [phone] or In Basket pool, [facility] ([id number])  CSN Number:  [id number]

## 2022-02-08 NOTE — PROGRESS NOTES
Clinic Care Coordination Contact  RUST/Voicemail     Clinical Data: CHW Outreach  Outreach attempted x 1. CHW spoke with patient briefly she said now wasn't a good time to talk. She said her boyfriend just  passed away. She asked for call back next month sometime. CHW gave condolences.     Plan: CHW will try to reach patient again in 1 month.    Alayna ESTRELLA CHW  Clinic Care Coordination  [facility] Clinics: [place], [place] & [place]  Phone: [phone]    Notes:  - How are you doing?  - were you able to look at housing resources, Call the county about getting a case manger and did you call about getting your puppy trained ?    - Boyfriend passed away.     Care Gaps:  - A1C  - Physical

## 2022-02-10 ENCOUNTER — DOCUMENTATION ONLY (OUTPATIENT)
Dept: PHARMACY | Facility: CLINIC | Age: 32
End: 2022-02-10

## 2022-02-10 ENCOUNTER — HOME INFUSION (PRE-WILLOW HOME INFUSION) (OUTPATIENT)
Dept: PHARMACY | Facility: CLINIC | Age: 32
End: 2022-02-10

## 2022-02-10 DIAGNOSIS — I26.99 PULMONARY EMBOLISM WITH INFARCTION (H): ICD-10-CM

## 2022-02-10 NOTE — PROGRESS NOTES
Discharge Note    Progress reporting period is from last SOAP note on Nov 23, 2021.    Hilaria failed to follow up and current status is unknown.  Please see information below for last relevant information on current status.  Patient seen for 14 visits.    SUBJECTIVE  Subjective changes noted by patient:  Patient had a therapeutic pool consultation before PT today and will start some time in January. Patient also reporting that she fell yesterday morning when helping her son dress for school. Missed her appt with neurologist yesterday because she did feel stable enough to go to appt.When pt falls she feels like her legs are no longer underneath her, giving out slowly/progressively. She reports she felt light headed yesterday as well.   .  Current pain level is 3/10.     Previous pain level was  10/10.   Changes in function:  Yes (See Goal flowsheet attached for changes in current functional level)  Adverse reaction to treatment or activity: None    OBJECTIVE  Changes noted in objective findings: Discussed sveral different possible reasons for her to be falling(as reported during last 2 visits), such as heart related vs Diabetic related, vs weakness in LE's. MMT of B LE's grossly 5/5. Reduced balance/proprioception in clinic, SLS alt LE <5 seconds.     ASSESSMENT/PLAN  Diagnosis: thoracic back pain(known thoracic compression fractures at T9 and T6)   Updated problem list and treatment plan:   Pain - HEP  Decreased function - HEP  Decreased strength - HEP  STG/LTGs have been met or progress has been made towards goals:  Yes, please see goal flowsheet for most current information  Assessment of Progress: current status is unknown.    Last current status: Pt is progressing as expected   Self Management Plans:  HEP  I have re-evaluated this patient and find that the nature, scope, duration and intensity of the therapy is appropriate for the medical condition of the patient.  Hilaria continues to require the following  intervention to meet STG and LTG's:  HEP.    Recommendations:  Discharge with current home program.  Patient to follow up with MD as needed.    Please refer to the daily flowsheet for treatment today, total treatment time and time spent performing 1:1 timed codes.

## 2022-02-10 NOTE — PROGRESS NOTES
Skilled Nurse visit in the  patient home/I Infusion Suite to administer privigen.  No recent elevated temperature, fever, chills, productive cough, coughing for 3 weeks or longer or hemoptysis, abnormal vital signs, night sweats, chest pain. No  decrease in your appetite, unexplained weight loss or fatigue.  No other new onset medical symptoms.  Current weight 290.  PIV placed in right hand, 2 attempt/s.  Pre medicated with hydrocortisone, tylenol and benadryl. Labs drawn none.  Infusion completed without complication or reaction. Pt reports therapy is effective in managing symptoms related to therapy.    Kristi Wilson RN BSN  [facility]  Email: [email or web address]  Phone: [phone]

## 2022-02-10 NOTE — TELEPHONE ENCOUNTER
Routing refill request to provider for review/approval because:  Labs out of range:  creatinine  Appointments in Next Year      Feb 15, 2022 10:40 AM  (Arrive by 10:20 AM)  Provider Visit with Crissy Madrigal PA-C  [facility] ([facility] ) [phone]     Requested Prescriptions   Pending Prescriptions Disp Refills    rivaroxaban ANTICOAGULANT (XARELTO ANTICOAGULANT) 20 MG TABS tablet 90 tablet 0     Sig: Take 1 tablet (20 mg) by mouth daily (with dinner)        Direct Oral Anticoagulant Agents Failed - 2/10/2022  8:49 AM        Failed - Creatinine Clearance greater than 50 ml/min on file in past 3 mos     No lab results found.            Failed - Serum creatinine less than or equal to 1.4 on file in past 3 mos     Recent Labs   Lab Test 10/22/21  0950 06/14/19  1516 06/14/19  1515   CR 0.88   < >  --    CREAT  --   --  0.9    < > = values in this interval not displayed.       Ok to refill medication if creatinine is low          Passed - Normal Platelets on file in past 12 months       Recent Labs   Lab Test 07/13/21  1240                  Passed - Medication is active on med list        Passed - Patient is 18 years of age or older        Passed - No active pregnancy on record        Passed - No positive pregnancy test within past 12 months        Passed - Recent (6 mo) or future (30 days) visit within the authorizing provider's specialty            Tasha Nolan RN, BSN  [facility]

## 2022-02-11 NOTE — PROGRESS NOTES
This is a recent snapshot of the patient's [facility] medical record.  For current drug dose and complete information and questions, call [phone] or In Basket pool, fv home infusion ([id number])  CSN Number:  [id number]

## 2022-02-15 ENCOUNTER — OFFICE VISIT (OUTPATIENT)
Dept: FAMILY MEDICINE | Facility: CLINIC | Age: 32
End: 2022-02-15
Payer: COMMERCIAL

## 2022-02-15 ENCOUNTER — TELEPHONE (OUTPATIENT)
Dept: ENDOCRINOLOGY | Facility: CLINIC | Age: 32
End: 2022-02-15

## 2022-02-15 VITALS
TEMPERATURE: 98.8 F | WEIGHT: 283 LBS | RESPIRATION RATE: 18 BRPM | SYSTOLIC BLOOD PRESSURE: 119 MMHG | DIASTOLIC BLOOD PRESSURE: 86 MMHG | BODY MASS INDEX: 44.42 KG/M2 | OXYGEN SATURATION: 98 % | HEIGHT: 67 IN | HEART RATE: 99 BPM

## 2022-02-15 DIAGNOSIS — I26.99 PULMONARY EMBOLISM WITH INFARCTION (H): ICD-10-CM

## 2022-02-15 DIAGNOSIS — G31.89 COGNITIVE AND NEUROBEHAVIORAL DYSFUNCTION FOLLOWING BRAIN INJURY (H): ICD-10-CM

## 2022-02-15 DIAGNOSIS — E11.65 TYPE 2 DIABETES MELLITUS WITH HYPERGLYCEMIA, WITHOUT LONG-TERM CURRENT USE OF INSULIN (H): Primary | ICD-10-CM

## 2022-02-15 DIAGNOSIS — R11.2 NAUSEA AND VOMITING, INTRACTABILITY OF VOMITING NOT SPECIFIED, UNSPECIFIED VOMITING TYPE: ICD-10-CM

## 2022-02-15 DIAGNOSIS — Z98.84 S/P LAPAROSCOPIC SLEEVE GASTRECTOMY: ICD-10-CM

## 2022-02-15 DIAGNOSIS — G61.81 CHRONIC INFLAMMATORY DEMYELINATING POLYNEUROPATHY (H): ICD-10-CM

## 2022-02-15 DIAGNOSIS — E53.8 VITAMIN B12 DEFICIENCY (NON ANEMIC): ICD-10-CM

## 2022-02-15 DIAGNOSIS — R00.0 TACHYCARDIA: ICD-10-CM

## 2022-02-15 DIAGNOSIS — F32.1 MODERATE MAJOR DEPRESSION (H): ICD-10-CM

## 2022-02-15 DIAGNOSIS — E66.01 MORBID OBESITY (H): Primary | ICD-10-CM

## 2022-02-15 DIAGNOSIS — S06.9XAS COGNITIVE AND NEUROBEHAVIORAL DYSFUNCTION FOLLOWING BRAIN INJURY (H): ICD-10-CM

## 2022-02-15 DIAGNOSIS — E21.1 SECONDARY HYPERPARATHYROIDISM, NOT ELSEWHERE CLASSIFIED (H): ICD-10-CM

## 2022-02-15 DIAGNOSIS — E87.6 HYPOKALEMIA: ICD-10-CM

## 2022-02-15 DIAGNOSIS — Z98.84 S/P LAPAROSCOPIC SLEEVE GASTRECTOMY: Primary | ICD-10-CM

## 2022-02-15 DIAGNOSIS — R79.89 ELEVATED LFTS: ICD-10-CM

## 2022-02-15 DIAGNOSIS — Z13.0 SCREENING FOR DEFICIENCY ANEMIA: ICD-10-CM

## 2022-02-15 DIAGNOSIS — E66.01 MORBID OBESITY (H): ICD-10-CM

## 2022-02-15 DIAGNOSIS — E55.9 VITAMIN D DEFICIENCY: ICD-10-CM

## 2022-02-15 DIAGNOSIS — Z79.899 ENCOUNTER FOR LONG-TERM (CURRENT) USE OF MEDICATIONS: ICD-10-CM

## 2022-02-15 DIAGNOSIS — E66.813 CLASS 3 SEVERE OBESITY DUE TO EXCESS CALORIES WITH SERIOUS COMORBIDITY AND BODY MASS INDEX (BMI) OF 45.0 TO 49.9 IN ADULT (H): ICD-10-CM

## 2022-02-15 DIAGNOSIS — F09 COGNITIVE AND NEUROBEHAVIORAL DYSFUNCTION FOLLOWING BRAIN INJURY (H): ICD-10-CM

## 2022-02-15 DIAGNOSIS — F43.21 GRIEF REACTION: ICD-10-CM

## 2022-02-15 DIAGNOSIS — E11.65 TYPE 2 DIABETES MELLITUS WITH HYPERGLYCEMIA, WITHOUT LONG-TERM CURRENT USE OF INSULIN (H): ICD-10-CM

## 2022-02-15 DIAGNOSIS — I10 BENIGN ESSENTIAL HYPERTENSION: ICD-10-CM

## 2022-02-15 DIAGNOSIS — E66.01 CLASS 3 SEVERE OBESITY DUE TO EXCESS CALORIES WITH SERIOUS COMORBIDITY AND BODY MASS INDEX (BMI) OF 45.0 TO 49.9 IN ADULT (H): ICD-10-CM

## 2022-02-15 LAB
ALBUMIN SERPL-MCNC: 2.6 G/DL (ref 3.4–5)
ALP SERPL-CCNC: 155 U/L (ref 40–150)
ALT SERPL W P-5'-P-CCNC: 89 U/L (ref 0–50)
AMYLASE SERPL-CCNC: 78 U/L (ref 30–110)
ANION GAP SERPL CALCULATED.3IONS-SCNC: 9 MMOL/L (ref 3–14)
AST SERPL W P-5'-P-CCNC: 140 U/L (ref 0–45)
BILIRUB SERPL-MCNC: 0.3 MG/DL (ref 0.2–1.3)
BUN SERPL-MCNC: 4 MG/DL (ref 7–30)
CALCIUM SERPL-MCNC: 8 MG/DL (ref 8.5–10.1)
CHLORIDE BLD-SCNC: 107 MMOL/L (ref 94–109)
CHOLEST SERPL-MCNC: 185 MG/DL
CO2 SERPL-SCNC: 25 MMOL/L (ref 20–32)
CREAT SERPL-MCNC: 0.7 MG/DL (ref 0.52–1.04)
ERYTHROCYTE [DISTWIDTH] IN BLOOD BY AUTOMATED COUNT: 15.1 % (ref 10–15)
FASTING STATUS PATIENT QL REPORTED: YES
FERRITIN SERPL-MCNC: 77 NG/ML (ref 12–150)
GFR SERPL CREATININE-BSD FRML MDRD: >90 ML/MIN/1.73M2
GLUCOSE BLD-MCNC: 153 MG/DL (ref 70–99)
HBA1C MFR BLD: 6.1 % (ref 0–5.6)
HCT VFR BLD AUTO: 41.6 % (ref 35–47)
HDLC SERPL-MCNC: 77 MG/DL
HGB BLD-MCNC: 13.4 G/DL (ref 11.7–15.7)
LDLC SERPL CALC-MCNC: 82 MG/DL
LIPASE SERPL-CCNC: 258 U/L (ref 73–393)
MCH RBC QN AUTO: 33.8 PG (ref 26.5–33)
MCHC RBC AUTO-ENTMCNC: 32.2 G/DL (ref 31.5–36.5)
MCV RBC AUTO: 105 FL (ref 78–100)
NONHDLC SERPL-MCNC: 108 MG/DL
PLATELET # BLD AUTO: 295 10E3/UL (ref 150–450)
POTASSIUM BLD-SCNC: 3.5 MMOL/L (ref 3.4–5.3)
PROT SERPL-MCNC: 7.8 G/DL (ref 6.8–8.8)
PTH-INTACT SERPL-MCNC: 48 PG/ML (ref 18–80)
RBC # BLD AUTO: 3.96 10E6/UL (ref 3.8–5.2)
SODIUM SERPL-SCNC: 141 MMOL/L (ref 133–144)
TRIGL SERPL-MCNC: 130 MG/DL
TSH SERPL DL<=0.005 MIU/L-ACNC: 1.06 MU/L (ref 0.4–4)
VIT B12 SERPL-MCNC: >6000 PG/ML (ref 193–986)
WBC # BLD AUTO: 4.1 10E3/UL (ref 4–11)

## 2022-02-15 PROCEDURE — 82607 VITAMIN B-12: CPT | Performed by: PHYSICIAN ASSISTANT

## 2022-02-15 PROCEDURE — 80061 LIPID PANEL: CPT | Performed by: PHYSICIAN ASSISTANT

## 2022-02-15 PROCEDURE — 96127 BRIEF EMOTIONAL/BEHAV ASSMT: CPT | Performed by: PHYSICIAN ASSISTANT

## 2022-02-15 PROCEDURE — 82306 VITAMIN D 25 HYDROXY: CPT | Performed by: PHYSICIAN ASSISTANT

## 2022-02-15 PROCEDURE — 83970 ASSAY OF PARATHORMONE: CPT | Performed by: PHYSICIAN ASSISTANT

## 2022-02-15 PROCEDURE — 83036 HEMOGLOBIN GLYCOSYLATED A1C: CPT | Performed by: PHYSICIAN ASSISTANT

## 2022-02-15 PROCEDURE — 84590 ASSAY OF VITAMIN A: CPT | Mod: 90 | Performed by: PHYSICIAN ASSISTANT

## 2022-02-15 PROCEDURE — 96372 THER/PROPH/DIAG INJ SC/IM: CPT | Performed by: PHYSICIAN ASSISTANT

## 2022-02-15 PROCEDURE — 85027 COMPLETE CBC AUTOMATED: CPT | Performed by: PHYSICIAN ASSISTANT

## 2022-02-15 PROCEDURE — 82728 ASSAY OF FERRITIN: CPT | Performed by: PHYSICIAN ASSISTANT

## 2022-02-15 PROCEDURE — 80053 COMPREHEN METABOLIC PANEL: CPT | Performed by: PHYSICIAN ASSISTANT

## 2022-02-15 PROCEDURE — 84443 ASSAY THYROID STIM HORMONE: CPT | Performed by: PHYSICIAN ASSISTANT

## 2022-02-15 PROCEDURE — 99000 SPECIMEN HANDLING OFFICE-LAB: CPT | Performed by: PHYSICIAN ASSISTANT

## 2022-02-15 PROCEDURE — 82150 ASSAY OF AMYLASE: CPT | Performed by: PHYSICIAN ASSISTANT

## 2022-02-15 PROCEDURE — 36415 COLL VENOUS BLD VENIPUNCTURE: CPT | Performed by: PHYSICIAN ASSISTANT

## 2022-02-15 PROCEDURE — 83690 ASSAY OF LIPASE: CPT | Performed by: PHYSICIAN ASSISTANT

## 2022-02-15 PROCEDURE — 99215 OFFICE O/P EST HI 40 MIN: CPT | Mod: 25 | Performed by: PHYSICIAN ASSISTANT

## 2022-02-15 RX ORDER — METFORMIN HCL 500 MG
500 TABLET, EXTENDED RELEASE 24 HR ORAL 2 TIMES DAILY WITH MEALS
Qty: 120 TABLET | Refills: 0 | Status: SHIPPED | OUTPATIENT
Start: 2022-02-15 | End: 2022-07-06

## 2022-02-15 RX ORDER — CHOLECALCIFEROL (VITAMIN D3) 50 MCG
1 TABLET ORAL DAILY
Qty: 90 TABLET | Refills: 3 | Status: SHIPPED | OUTPATIENT
Start: 2022-02-15 | End: 2023-04-28

## 2022-02-15 RX ORDER — CYANOCOBALAMIN 1000 UG/ML
1000 INJECTION, SOLUTION INTRAMUSCULAR; SUBCUTANEOUS
Status: DISCONTINUED | OUTPATIENT
Start: 2022-02-15 | End: 2023-05-12

## 2022-02-15 RX ADMIN — CYANOCOBALAMIN 1000 MCG: 1000 INJECTION, SOLUTION INTRAMUSCULAR; SUBCUTANEOUS at 11:10

## 2022-02-15 RX ADMIN — MEDROXYPROGESTERONE ACETATE 150 MG: 150 INJECTION, SUSPENSION INTRAMUSCULAR at 11:56

## 2022-02-15 RX ADMIN — CYANOCOBALAMIN 1000 MCG: 1000 INJECTION, SOLUTION INTRAMUSCULAR; SUBCUTANEOUS at 11:55

## 2022-02-15 ASSESSMENT — ANXIETY QUESTIONNAIRES
7. FEELING AFRAID AS IF SOMETHING AWFUL MIGHT HAPPEN: SEVERAL DAYS
4. TROUBLE RELAXING: SEVERAL DAYS
2. NOT BEING ABLE TO STOP OR CONTROL WORRYING: SEVERAL DAYS
7. FEELING AFRAID AS IF SOMETHING AWFUL MIGHT HAPPEN: SEVERAL DAYS
3. WORRYING TOO MUCH ABOUT DIFFERENT THINGS: SEVERAL DAYS
GAD7 TOTAL SCORE: 9
1. FEELING NERVOUS, ANXIOUS, OR ON EDGE: SEVERAL DAYS
GAD7 TOTAL SCORE: 9
6. BECOMING EASILY ANNOYED OR IRRITABLE: SEVERAL DAYS
GAD7 TOTAL SCORE: 9
5. BEING SO RESTLESS THAT IT IS HARD TO SIT STILL: NEARLY EVERY DAY

## 2022-02-15 ASSESSMENT — MIFFLIN-ST. JEOR: SCORE: 2031.31

## 2022-02-15 ASSESSMENT — PAIN SCALES - GENERAL: PAINLEVEL: SEVERE PAIN (6)

## 2022-02-15 ASSESSMENT — PATIENT HEALTH QUESTIONNAIRE - PHQ9
10. IF YOU CHECKED OFF ANY PROBLEMS, HOW DIFFICULT HAVE THESE PROBLEMS MADE IT FOR YOU TO DO YOUR WORK, TAKE CARE OF THINGS AT HOME, OR GET ALONG WITH OTHER PEOPLE: EXTREMELY DIFFICULT
SUM OF ALL RESPONSES TO PHQ QUESTIONS 1-9: 7
SUM OF ALL RESPONSES TO PHQ QUESTIONS 1-9: 7

## 2022-02-15 NOTE — RESULT ENCOUNTER NOTE
Dear Hilaria  Your diabetes is adequately controlled with the metformin at 500 mg twice a day  I will notify you of the rest of your labs as they become available.  Please call or MyChart my office with any questions or concerns.   Crissy Madrigal, PAC

## 2022-02-15 NOTE — PATIENT INSTRUCTIONS
I will notify you of your lab results as soon as they are available.  Follow up with psychology for grief

## 2022-02-15 NOTE — NURSING NOTE
Clinic Administered Medication Documentation          Injectable Medication Documentation    Patient was given Cyanocobalamin (B-12). Prior to medication administration, verified patients identity using patient s name and date of birth. Please see MAR and medication order for additional information. Patient instructed to remain in clinic for 15 minutes.      Was entire vial of medication used? Yes  Vial/Syringe: Single dose vial  Expiration Date:  12/31/2022  Was this medication supplied by the patient? No    Name of provider who requested the medication administration: Crissy Madrigal  Name of provider on site (faculty or community preceptor) at the time of performing the medication administration: Crissy Madrigal    Date of next administration: March 15, 2022  Date of next office visit with provider to renew medication plan (must be seen annually): NA    Clinic Administered Medication Documentation          Depo Provera Documentation    URINE HCG: not indicated    Depo-Provera Standing Order inclusion/exclusion criteria reviewed.   Patient meets: inclusion criteria     BP: 119/86  LAST PAP/EXAM:   Lab Results   Component Value Date    PAP NIL 09/27/2019       Prior to injection, verified patient identity using patient's name and date of birth. Medication was administered. Please see MAR and medication order for additional information.     Was entire vial of medication used? Yes  Vial/Syringe: Single dose vial  Expiration Date:  04/30/2023    Patient instructed to remain in clinic for 15 minutes.  NEXT INJECTION DUE: 5/3/22 - 5/17/22      Name of provider who requested the medication administration: Crissy Madrigal  Name of provider on site (faculty or community preceptor) at the time of performing the medication administration: Crissy Madrigal    Date of next administration: 5/6/2022  Date of next office visit with provider to renew medication plan (must be seen annually): NA    BP: 119/86    LAST  PAP/EXAM:   Lab Results   Component Value Date    PAP NIL 09/27/2019     URINE HCG:not indicated    The following medication was given:     MEDICATION: Depo Provera 150mg  ROUTE: IM  SITE: Deltoid - Right  : [employer]  LOT #: [id number]  EXP:04/30/2023  NEXT INJECTION DUE: 5/3/22 - 5/17/22   Provider: Crissy Madrigal

## 2022-02-15 NOTE — RESULT ENCOUNTER NOTE
Dear Hilaria  Your amylase and lipase were normal, indicating that you do not have pancreatitis.  Please call or MyChart my office with any questions or concerns.   Crissy Madrigal, PAC

## 2022-02-15 NOTE — LETTER
February 15, 2022      Hilaria TORRES Bonner  [address]  [address]  [facility] [id number]        To Whom It May Concern,     Hilaria has diabetes and takes metformin for diabetes         Sincerely,        Crissy Madrigal PA-C

## 2022-02-15 NOTE — TELEPHONE ENCOUNTER
Message back from Joanne Sterling Pa-C with plan. Systems Integration message sent to patient to inform.     We can add TSH with reflex T4.  If her thyroid labs are abnormal we can refer to general endo.     Yes increase omeprazole to 20mg BID for now.  Looks like she is seeing me 3/10 so I can see if this is helping.       Can you please ask her to schedule RD visit soon too if possible? Likely some dietary changes that can help.     If I see her on 3/2 and things not improved with PP increase I may order EGD.

## 2022-02-15 NOTE — PROGRESS NOTES
Assessment & Plan     Type 2 diabetes mellitus with hyperglycemia, without long-term current use of insulin (H)  A1C 6.1- diabetes well controlled in spite of lowering metformin dose  - HEMOGLOBIN A1C  - metFORMIN (GLUCOPHAGE-XR) 500 MG 24 hr tablet  Dispense: 120 tablet; Refill: 0  - HEMOGLOBIN A1C  - TSH with free T4 reflex    Encounter for long-term (current) use of medications  Followed by many specialists     Benign essential hypertension  Blood pressure at goal with lisinopril     Hypokalemia  On potassium     Screening for deficiency anemia  Hemoglobin normal     Tachycardia  With exercise- will check with oximeter  - Miscellaneous Order for DME - ONLY FOR DME    S/P laparoscopic sleeve gastrectomy  Followed by endocrinology  - vitamin D3 (CHOLECALCIFEROL) 50 mcg (2000 units) tablet  Dispense: 90 tablet; Refill: 3  - CBC with platelets  - Comprehensive metabolic panel  - Ferritin  - Lipid panel reflex to direct LDL Fasting  - Vitamin D Deficiency  - Vitamin B12  - Vitamin A  - Parathyroid Hormone Intact  - TSH with free T4 reflex    Vitamin D deficiency  On vitamin D   - 25 OH Vit D therapy monitoring  - 25 OH Vit D therapy monitoring    Nausea and vomiting, intractability of vomiting not specified, unspecified vomiting type  Rule out pancreatitis- history of alcohol related pancreatitis in past but no longer drinking- comprehensive metabolic panel pending from endocrinology  - Lipase  - Amylase  - Lipase  - Amylase    Moderate major depression (H)  Referred for psychology as well as continues to follow up with psychiatry  - Adult Mental Health  Referral    Grief reaction  Recent death of her fabián's father- I will also send a staff message to her psychiatry so that they are aware  - Adult Mental Health  Referral    Vitamin B12 deficiency (non anemic)  Getting B12 monthly   - cyanocobalamin injection 1,000 mcg    Morbid obesity (H)  Followed by endocrinology  7 pound weight loss last  month ?? 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    Crissy Madrigal PA-C  [facility] is a 31 year old who presents for the following health issues     Medication Followup of - Med check and depo  Addition Concern - Stomach acid since last seen, causing some problem with teeth.          History of Present Illness       Back Pain:  She presents for follow up of back pain. Patient's back pain is a recurring problem.  Location of back pain:  Right lower back and left lower back  Description of back pain: sharp  Back pain spreads: right shoulder, left shoulder, right side of neck and left side of neck    Since patient first noticed back pain, pain is: always present, but gets better and worse  Does back pain interfere with her job:  Not applicable      Mental Health Follow-up:  Patient presents to follow-up on Depression & Anxiety.Patient's depression since last visit has been:  No change  The patient is having other symptoms associated with  depression.  Patient's anxiety since last visit has been:  Worse  The patient is having other symptoms associated with anxiety.  Any significant life events: grief or loss  Patient is feeling anxious or having panic attacks.  Patient has no concerns about alcohol or drug use.     Social History  Tobacco Use    Smoking status: Current Every Day Smoker      Packs/day: 0.25      Years: 1.00      Pack years: .25      Types: Cigarettes      Start date: 11/20/2016    Smokeless tobacco: Never Used  Vaping Use    Vaping Use: Never used  Alcohol use: Not Currently    Comment: Quit drinking when last hospitalized  Drug use: Not Currently    Types: Marijuana      Today's PHQ-9         PHQ-9 Total Score:     (P) 7   PHQ-9 Q9 Thoughts of better off dead/self-harm past 2 weeks :   (P) Not at all   Thoughts of suicide or self harm:      Self-harm Plan:        Self-harm Action:          Safety concerns for self or others:           Diabetes:   She presents for follow up of diabetes.  She is checking home blood glucose one time daily. She checks blood glucose before meals, after meals, before and after meals and at bedtime.  Blood glucose is never over 200 and never under 70. When her blood glucose is low, the patient is asymptomatic for confusion, blurred vision, lethargy and reports not feeling dizzy, shaky, or weak.  She has no concerns regarding her diabetes at this time.  She is having numbness in feet, burning in feet, excessive thirst and blurry vision.         Hypertension: She presents for follow up of hypertension.  She does check blood pressure  regularly outside of the clinic. Outside blood pressures have been over 140/90. She does not follow a low salt diet.     Migraines:   Since the patient's last clinic visit, headaches are: worsened  The patient is getting headaches:  Everyday  She is not able to do normal daily activities when she has a migraine.  The patient is taking the following rescue/relief medications:   "Tylenol   Patient states \"I get only a small amount of relief\" from the rescue/relief medications.   The patient is taking the following medications to prevent migraines:  Other  In the past 4 weeks, the patient has gone to an Urgent Care or Emergency Room 0 times times due to headaches.    She eats 2-3 servings of fruits and vegetables daily.She consumes 0 sweetened beverage(s) daily.She exercises with enough effort to increase her heart rate 20 to 29 minutes per day.  She exercises with enough effort to increase her heart rate 3 or less days per week.   She is taking medications regularly.     She decreased her metformin from 1000 mg twice a day to 500 mg twice a day due to increase in headache with metformin  Recent death of children's dad- found him dead in bed last week  Crying every day even if taking medication.  Has seen psychiatry  (Dr. Torres) reports  Olanzapine and Venlafaxine added  to help with voices  In head  Reports that she is to follow up with psychiatry Every 3 months-saw him prior  To \"stuff happening\".  Adamantly denies any thought of harming self.  Reports he is Supposed to refer me to psychotherapy - but has not started yet.  Sounds like on again off again relationship with children's father -\"Together for 15 yrs\".    I try to keep myself from breaking down    At least 4 times out of week very nauseated and throwing up- looks like stomach acid or bile- went to dentist and real bad acid reflux -eating at enamel of teeth- recommended a mouth guard which her insurance wouldn't cover   Has done some Pool therapy for my back and they are worried about heart rate- and recommended that she get an Oximeter-   soemtimes feel like back is good but sometimes crucial pain  \"Fractures are healed according to the specialists \" but continues with chronic pain  Lost 7 pounds  Seeing chiropractor that  recommended - was helpful in back  No abdominal pain.    Neuropathy persists   Followed by palliative " "care in past for chronic pain and advised against opioids, sees endocrinology for her obesity, neurology for neuropathy post covid- receives infusions at home  Has a PCA to help set up medications - has declined home care for this service    Review of Systems   Constitutional, HEENT, cardiovascular, pulmonary, gi and gu systems are negative, except as otherwise noted.      Objective    /86 (BP Location: Right arm, Patient Position: Right side, Cuff Size: Adult Regular)   Pulse 99   Temp 98.8  F (37.1  C) (Oral)   Resp 18   Ht 1.702 m (5' 7\")   Wt 128.4 kg (283 lb)   SpO2 98%   BMI 44.32 kg/m    Body mass index is 44.32 kg/m .  Physical Exam   GENERAL: alert, no distress and obese  NECK: no adenopathy, no asymmetry, masses, or scars and thyroid normal to palpation  RESP: lungs clear to auscultation - no rales, rhonchi or wheezes  CV: regular rate and rhythm, normal S1 S2, no S3 or S4, no murmur, click or rub, no peripheral edema and peripheral pulses strong  ABDOMEN: soft, nontender, no hepatosplenomegaly, no masses and bowel sounds normal  PSYCH: concentration poor, tearful, judgement and insight fair and appearance well groomed    Results for orders placed or performed in visit on 02/15/22 (from the past 24 hour(s))   CBC with platelets   Result Value Ref Range    WBC Count 4.1 4.0 - 11.0 10e3/uL    RBC Count 3.96 3.80 - 5.20 10e6/uL    Hemoglobin 13.4 11.7 - 15.7 g/dL    Hematocrit 41.6 35.0 - 47.0 %     (H) 78 - 100 fL    MCH 33.8 (H) 26.5 - 33.0 pg    MCHC 32.2 31.5 - 36.5 g/dL    RDW 15.1 (H) 10.0 - 15.0 %    Platelet Count 295 150 - 450 10e3/uL   HEMOGLOBIN A1C   Result Value Ref Range    Hemoglobin A1C 6.1 (H) 0.0 - 5.6 %     *Note: Due to a large number of results and/or encounters for the requested time period, some results have not been displayed. A complete set of results can be found in Results Review.               Answers for HPI/ROS submitted by the patient on 2/15/2022  If you " checked off any problems, how difficult have these problems made it for you to do your work, take care of things at home, or get along with other people?: Extremely difficult  PHQ9 TOTAL SCORE: 7  MELODY 7 TOTAL SCORE: 9

## 2022-02-15 NOTE — TELEPHONE ENCOUNTER
Called and spoke with patient in regards to reflux/vomiting. Patient states she is at PCP office now, waiting to have labs drawn. PCP has concerns about her thyroid. Patient states she throws up acid and bile up to 5 times daily (in the morning). She is currently taking Omeprazole 20mg daily in the morning. Routing to Joanne Sterling PA-C to review and advise.

## 2022-02-16 LAB — DEPRECATED CALCIDIOL+CALCIFEROL SERPL-MC: 58 UG/L (ref 20–75)

## 2022-02-16 ASSESSMENT — ANXIETY QUESTIONNAIRES: GAD7 TOTAL SCORE: 9

## 2022-02-16 ASSESSMENT — PATIENT HEALTH QUESTIONNAIRE - PHQ9: SUM OF ALL RESPONSES TO PHQ QUESTIONS 1-9: 7

## 2022-02-16 NOTE — PROGRESS NOTES
This is a recent snapshot of the patient's [facility] medical record.  For current drug dose and complete information and questions, call [phone] or In Basket pool, fv home infusion ([id number])  CSN Number:  [id number]

## 2022-02-18 LAB
ANNOTATION COMMENT IMP: NORMAL
DEPRECATED CALCIDIOL+CALCIFEROL SERPL-MC: <61 UG/L (ref 20–75)
RETINYL PALMITATE SERPL-MCNC: <0.02 MG/L
VIT A SERPL-MCNC: 0.43 MG/L
VITAMIN D2 SERPL-MCNC: <5 UG/L
VITAMIN D3 SERPL-MCNC: 56 UG/L

## 2022-02-18 NOTE — RESULT ENCOUNTER NOTE
Dear Hilaria   Your vitamin D level was normal.  Continue supplement as you have been taking.   Return urgently if any change in symptoms.    Feel free to schedule an appointment with me if you need anything.  Crissy Madrigal, PAC

## 2022-03-01 ENCOUNTER — HOME INFUSION (PRE-WILLOW HOME INFUSION) (OUTPATIENT)
Dept: PHARMACY | Facility: CLINIC | Age: 32
End: 2022-03-01

## 2022-03-03 ENCOUNTER — VIRTUAL VISIT (OUTPATIENT)
Dept: PHARMACY | Facility: CLINIC | Age: 32
End: 2022-03-03
Payer: COMMERCIAL

## 2022-03-03 DIAGNOSIS — K21.9 GASTROESOPHAGEAL REFLUX DISEASE WITHOUT ESOPHAGITIS: ICD-10-CM

## 2022-03-03 DIAGNOSIS — F41.1 GAD (GENERALIZED ANXIETY DISORDER): ICD-10-CM

## 2022-03-03 DIAGNOSIS — E66.01 CLASS 3 SEVERE OBESITY DUE TO EXCESS CALORIES IN ADULT, UNSPECIFIED BMI, UNSPECIFIED WHETHER SERIOUS COMORBIDITY PRESENT (H): ICD-10-CM

## 2022-03-03 DIAGNOSIS — E66.813 CLASS 3 SEVERE OBESITY DUE TO EXCESS CALORIES IN ADULT, UNSPECIFIED BMI, UNSPECIFIED WHETHER SERIOUS COMORBIDITY PRESENT (H): ICD-10-CM

## 2022-03-03 DIAGNOSIS — F29 PSYCHOSIS, UNSPECIFIED PSYCHOSIS TYPE (H): ICD-10-CM

## 2022-03-03 DIAGNOSIS — E11.65 TYPE 2 DIABETES MELLITUS WITH HYPERGLYCEMIA, WITHOUT LONG-TERM CURRENT USE OF INSULIN (H): Primary | ICD-10-CM

## 2022-03-03 PROCEDURE — 99605 MTMS BY PHARM NP 15 MIN: CPT | Performed by: PHARMACIST

## 2022-03-03 PROCEDURE — 99607 MTMS BY PHARM ADDL 15 MIN: CPT | Performed by: PHARMACIST

## 2022-03-03 NOTE — PATIENT INSTRUCTIONS
Recommendations from today's MTM visit:                                                       1. Increase Omeprazole to 20 mg twice daily, call pharmacy to see if there are issues with filling the new prescription.     2. Work on consistent regimened meals, follow up with dietitian to discuss nutritional modifications.     3. Schedule ollow up with Dr. Torres.     Future: utilization of weight loss medication(s)     Follow-up: 3 months     It was great to speak with you today.  I value your experience and would be very thankful for your time with providing feedback on our clinic survey. You may receive a survey via email or text message in the next few days.       My Clinical Pharmacist's contact information:                                                      Please feel free to contact me with any questions or concerns you have.      Lauren Bloch, PharmD  Medication Therapy Management Pharmacist   [facility] [place]

## 2022-03-03 NOTE — PROGRESS NOTES
Medication Therapy Management (MTM) Encounter    ASSESSMENT:                            Medication Adherence/Access: No issues identified    Obesity: Discussed dietary and behavioral modifications. Patient would prefer to work on eating more regimented consistent meals at this time then to consider using of weight loss medication(s) such as topiramate potentially in the future.     Type 2 Diabetes:  A1c at goal < 7%. AM fasting not at goal  mg/dL; however does appear stable. Reasonable to continue as of now due to more recent A1c. Could consider retrial metformin increase in future.     MELODY/Psychosis: Needs improvement. May benefit from follow up with Dr. Torres regarding continued auditory and visual hallucinations that are now more bothersome. May benefit from further adjustment of olanzapine or alternative next step.     GERD: Would benefit from increasing omeprazole to twice daily as previously prescribed.       PLAN:                            1. Increase Omeprazole to 20 mg twice daily, call pharmacy to see if there are issues with filling the new prescription.     2. Work on consistent regimened meals, follow up with dietitian to discuss nutritional modifications.     3. Follow up with Dr. Torres about current concerns of auditory hallucinations.      Future: utilization of weight loss medication(s)     Follow-up: 3 months      SUBJECTIVE/OBJECTIVE:                          Hilaria Bonner is a 31 year old female called for a follow-up visit.  Today's visit is a follow-up MTM visit from 12/20/2021 with MTM pharmacist Piper Schuler, MTM pharmacist at patient's primary care provider office.      Reason for visit: options for weight loss medication(s).    Allergies/ADRs: Reviewed in chart  Past Medical History: Reviewed in chart  Tobacco: She reports that she has been smoking cigarettes. She started smoking about 5 years ago. She has a 0.25 pack-year smoking history. She has never used smokeless  "tobacco.Tobacco Cessation Action Plan:   Information offered: Patient not interested at this time  Alcohol: not currently using, reports no alcohol in over a year    Medication Adherence/Access: no issues reported    Obesity:   No medications     Followed by Joanne Sterling PA-C, seen 12/1/2021 for [facility]. She is wanting to discuss options(s) for weight loss medication(s).   Diet/Eating Habits: Patient reports as of right now she doesn't have an appetite. When she is hungry she will eat chips or bowl of soup. Reports that she craves salty foods, chips and pickles. Enjoys popcorn. Reports that when she does eat that she gets full quickly. She does meal replacement shakes when she doesn't have appetite, will have one almost every day.   Failed medications include: history of pancreatitis.     Wt Readings from Last 4 Encounters:   02/15/22 283 lb (128.4 kg)   01/12/22 290 lb (131.5 kg)   12/29/21 290 lb 3.2 oz (131.6 kg)   12/20/21 289 lb 3.2 oz (131.2 kg)     Estimated body mass index is 44.32 kg/m  as calculated from the following:    Height as of 2/15/22: 5' 7\" (1.702 m).    Weight as of 2/15/22: 283 lb (128.4 kg).    Type 2 Diabetes:    Metformin XR 1000 mg daily     Medication side effects: none. No headaches or migraines. Does have some diarrhea with metformin but is manageable, has lessened. She isn't sure if she wants to try to go up on the metformin because of the headaches she had before. History of alcohol induced pancreatitis.   Blood sugar monitoring: one time(s) daily. Ranges (patient reported): 125-140mg/dL  Symptoms of low blood sugar? none  Symptoms of high blood sugar? none  Eye exam: has appt scheduled 1/7/22  Foot exam: up to date  Diet/Exercise: Not addressed today  Aspirin: Not taking due to age  Statin: No. Not addressed today  ACEi/ARB: Yes: Lisinopril.   Urine Albumin:         Lab Results   Component Value Date     UMALCR 42.45 (H) 11/04/2021      Hemoglobin A1C POCT " "  Date Value Ref Range Status   03/02/2021 5.0 0 - 5.6 % Final     Comment:     Normal <5.7% Prediabetes 5.7-6.4%  Diabetes 6.5% or higher - adopted from ADA   consensus guidelines.       Hemoglobin A1C   Date Value Ref Range Status   02/15/2022 6.1 (H) 0.0 - 5.6 % Final     Comment:     Normal <5.7%   Prediabetes 5.7-6.4%    Diabetes 6.5% or higher     Note: Adopted from ADA consensus guidelines.     MELODY/Psychosis:   Venlafaxine  mg (three 75 mg) once daily, increased 2/2/2022  Olanzapine 10mg at betdime  Prazosin 2mg at bedtime  Hydroxyzine 25mg every 6 hours as needed for anxiety    Reports that Venlafaxine dose was increased \"a little bit ago\". Report she has greater auditory hallucinations, reports she is hearing a \"shhhing sound like waves in ocean\". Report she \"sees spirits\". Reports that sometimes visual hallucinations are scary, \"trying to reach out to me to relay messages\". Reports boyfriend passed away recently, \"I still talk to my boyfriend a lot, see him a lot\". She is feeling down/depressed. No command hallucinations. Referral to therapist. Reports taking the hydroxyzine more. No thoughts of self harm/suicide. Follows with Dr. Torres.     GERD:   Prilosec (omeprazole) 20 mg once daily, prescribed to increase to twice daily 2 weeks ago    Increased a couple weeks ago due to reflux vomiting 5 days a week in AM. Hasn't increased the omeprazole as prescribed.     ----------------      I spent 30 minutes with this patient today. A copy of the visit note was provided to the patient's provider(s).    The patient was sent via [employer] a summary of these recommendations.     Lauren Bloch, PharmD, BCACP   Medication Therapy Management Pharmacist   [facility] [place]    Telemedicine Visit Details  Type of service:  Telephone visit  Start Time: 2:30 PM  End Time: 3:00 PM  Originating Location (patient location): Home  Distant Location (provider location):  [facility]     Medication Therapy Recommendations  Gastroesophageal reflux disease without esophagitis    Current Medication: omeprazole (PRILOSEC) 20 MG DR capsule   Rationale: Patient forgets to take - Adherence - Adherence   Recommendation: Provide Education   Status: Patient Agreed - Adherence/Education

## 2022-03-03 NOTE — Clinical Note
She did not want to start weight loss medication(s) right now, wanted to work on more regimented meals. But thought topiramate in the futue could be of consideration but would want to reassess psych status before. Also she never went up on PPI to twice daily like you prescribed, so recommended she do that. Rossana DE LEÓN

## 2022-03-03 NOTE — PROGRESS NOTES
This is a recent snapshot of the patient's [facility] medical record.  For current drug dose and complete information and questions, call [phone] or In Basket pool, [facility] home infusion ([id number])  CSN Number:  [id number]

## 2022-03-04 ENCOUNTER — PATIENT OUTREACH (OUTPATIENT)
Dept: CARE COORDINATION | Facility: CLINIC | Age: 32
End: 2022-03-04
Payer: COMMERCIAL

## 2022-03-04 NOTE — PROGRESS NOTES
Clinic Care Coordination Contact  [facility]/Voicemail       Clinical Data: Care Coordinator Outreach  Outreach attempted x 2.  Left message on patient's voicemail with call back information and requested return call.  Plan: Care Coordinator will try to reach patient again in 3 weeks.    IVET Bowens  Primary Care Clinic- Social Work Care Coordinator  [facility] and Jaclyn L[facility]pez  Ph: [phone]  3/4/2022 11:12 AM

## 2022-03-07 ENCOUNTER — VIRTUAL VISIT (OUTPATIENT)
Dept: GASTROENTEROLOGY | Facility: CLINIC | Age: 32
End: 2022-03-07
Attending: INTERNAL MEDICINE
Payer: COMMERCIAL

## 2022-03-07 DIAGNOSIS — R79.89 ELEVATED LFTS: ICD-10-CM

## 2022-03-07 PROCEDURE — 99204 OFFICE O/P NEW MOD 45 MIN: CPT | Mod: 95 | Performed by: INTERNAL MEDICINE

## 2022-03-07 PROCEDURE — G0463 HOSPITAL OUTPT CLINIC VISIT: HCPCS | Mod: PN,RTG | Performed by: INTERNAL MEDICINE

## 2022-03-07 NOTE — LETTER
3/7/2022    RE: Hilaria Bonner  [address]  [address]  [place] [address]    Dear Colleague,    Thank you for referring your patient, Hilaria Bonner, to the [facility] [place]. Please see a copy of my visit note below.    Hilaria is a 31 year old who is being evaluated via a billable video visit.      How would you like to obtain your AVS? MyChart  If the video visit is dropped, the invitation should be resent by: Text to cell phone: [phone]  Will anyone else be joining your video visit? No      Video Start Time: 1301    Video-Visit Details    Type of service:  Video Visit    Video End Time:1:23 PM    Originating Location (pt. Location): Home    Distant Location (provider location):  [facility] [place]     Platform used for Video Visit: [employer]   ____________________________________________________________________________    [facility] Hepatology    New Patient Visit    Referring provider:  Crissy Madrigal      31 year old female    Chief complaint:  Abnormal liver function tests    HPI:  The patient presents for further evaluation and management of abnormal liver function tests.  Liver function tests were first noted to be abnormal last fall.  Testing for hepatitis B and C were notable only for immunity to hepatitis B.  Abdominal ultrasound showed increased echogenicity of the liver.    The patient is well today.  She has no symptoms related to liver disease.    The patient denies jaundice, abdominal distention, lower extremity edema, lethargy or confusion.    The patient denies melena, hematemesis or hematochezia.    The patient denies fever, sweats or chills.  She is not vaccinated against COVID-19 as she does not trust the vaccine.  She had COVID-19 in 11/2020.    Weight is stable.  The patient currently weighs 287 pounds and is 5 feet 6 inches in height.  BMI= 46.      History of gastric sleeve surgery in 2019 noted.  The  patient weighed 330 pounds at that time and her weight decreased to 250 pounds.  Her weight had increased after having a PE (resulting in cardiac arrest) in 2019.  [facility].    History of obesity, KENDALL, type 2 diabetes and hypertension noted.  No prior history of CAD, CVA, PAD, or hyperlipidemia.    The patient does not drink alcohol.  She last drank alcohol 12 months ago.  Prior to this, she was drinking 4 days a week and anywhere from 1 pint to 1 fifth of vodka on these occasions.  The patient quit drinking alcohol cold turkey.  She has not needed AA, one-on-one counseling or inpatient/outpatient treatment.  The patient has a history of alcoholic pancreatitis for which she was admitted to the hospital 2 or 3 times.    The patient is a current smoker.  She smokes approximately half pack of cigarettes per day and started smoking around the age of 16.    The patient denies any history of recreational drug use including marijuana, IN or IV drugs.      Medical hx Surgical hx   Past Medical History:   Diagnosis Date     Acute kidney injury (H) 2019     Acute massive pulmonary embolism (H) 2019     Acute pancreatitis 2018     Acute pancreatitis 2021    due to ETOH     Acute thoracic back pain 2021     ARAMIS (acute kidney injury) (H) 2019     Cardiac arrest, cause unspecified (H) 2019    massive pulmonary embolism with pulseless electrical activity cardiac arrest in May 2019 following gastric bypass in 2019      Depression with anxiety      GERD (gastroesophageal reflux disease)      History of alcohol use disorder      HTN (hypertension)      Infection due to 2019 novel coronavirus 2020    COVID19 infection in 2020     Ischemic colitis (H) 2019     Morbid obesity (H)      Scalp laceration, initial encounter 2020      Past Surgical History:   Procedure Laterality Date      SECTION        LAPAROSCOPIC GASTRIC SLEEVE N/A 03/26/2019    Procedure: Laparoscopic Sleeve Gastrectomy;  Surgeon: Luan Lopez MD;  Location: [facility] OR     ORTHOPEDIC SURGERY      2 knee meniscus surgery          Medications  Current Outpatient Medications   Medication Sig Dispense Refill     alcohol swab prep pads Use to swab area of injection/luke as directed. 100 each 11     ammonium lactate (AMLACTIN) 12 % external cream Apply topically 2 times daily 385 g 4     Biotin 5000 MCG TABS Take 1 tablet by mouth daily       blood glucose (NO BRAND SPECIFIED) test strip Use to test blood sugar 1 times daily or as directed. To accompany: Blood Glucose Monitor Brands: per insurance. 100 strip 11     blood glucose calibration (NO BRAND SPECIFIED) solution To accompany: Blood Glucose Monitor Brands: per insurance. 100 each 11     blood glucose monitoring (NO BRAND SPECIFIED) meter device kit Use to test blood sugar 1 times daily or as directed. Preferred blood glucose meter OR supplies to accompany: Blood Glucose Monitor Brands: per insurance. 1 kit 0     calcium Citrate-vitamin D 500-400 MG-UNIT CHEW Take 1 chew tab by mouth 3 times daily 90 tablet 11     diphenhydrAMINE (BENADRYL) 50 MG capsule Take 50 mg by mouth every 6 hours as needed for allergies or other (prior to infusion)       EPINEPHrine (ANY BX GENERIC EQUIV) 0.3 MG/0.3ML injection 2-pack        hydrOXYzine (ATARAX) 25 MG tablet Take 1 tablet (25 mg) by mouth every 6 hours as needed for anxiety 120 tablet 2     lisinopril (ZESTRIL) 10 MG tablet Take 1 tablet (10 mg) by mouth daily 90 tablet 0     medroxyPROGESTERone (DEPO-PROVERA) 150 MG/ML IM injection Inject 150 mg into the muscle every 3 months       metFORMIN (GLUCOPHAGE-XR) 500 MG 24 hr tablet Take 1 tablet (500 mg) by mouth 2 times daily (with meals) 120 tablet 0     methocarbamol (ROBAXIN) 500 MG tablet Take 1-2 tablets (500-1,000 mg) by mouth 3 times daily as needed for muscle spasms 75 tablet 1     Multiple  Vitamins-Minerals (MULTIVITAMIN ADULT) CHEW Take 1 chew tab by mouth 2 times daily 60 tablet 11     OLANZapine (ZYPREXA) 10 MG tablet Take 1 tablet (10 mg) by mouth At Bedtime 30 tablet 3     omeprazole (PRILOSEC) 20 MG DR capsule Take 1 capsule (20 mg) by mouth 2 times daily 180 capsule 3     ondansetron (ZOFRAN-ODT) 4 MG ODT tab DISSOLVE 1 TABLET(4 MG) ON THE TONGUE EVERY 8 HOURS AS NEEDED FOR NAUSEA 30 tablet 1     polyethylene glycol (MIRALAX) 17 GM/SCOOP powder Take 17 g (1 capful) by mouth daily 850 g 3     potassium chloride ER (K-TAB/KLOR-CON) 10 MEQ CR tablet Take 2 tablets (20 mEq) by mouth 2 times daily 120 tablet 1     prazosin (MINIPRESS) 1 MG capsule TAKE 2 CAPSULES(2 MG) BY MOUTH AT BEDTIME 60 capsule 2     Pregabalin (LYRICA) 200 MG capsule Take 1 capsule (200 mg) by mouth 3 times daily 270 capsule 0     PRIVIGEN 20 GM/200ML SOLN        PRIVIGEN 40 GM/400ML SOLN        rivaroxaban ANTICOAGULANT (XARELTO ANTICOAGULANT) 20 MG TABS tablet Take 1 tablet (20 mg) by mouth daily (with dinner) 90 tablet 0     SODIUM CHLORIDE FLUSH 0.9 % flush        SOLU-CORTEF 100 MG injection        thin (NO BRAND SPECIFIED) lancets Use with lanceting device. To accompany: Blood Glucose Monitor Brands: per insurance. 100 each 11     venlafaxine (EFFEXOR-XR) 75 MG 24 hr capsule Take 3 capsules (225 mg) by mouth daily 90 capsule 3     vitamin B-Complex Take 1 tablet by mouth daily 90 tablet 3     vitamin C (ASCORBIC ACID) 1000 MG TABS Take 1 tablet (1,000 mg) by mouth daily 90 tablet 3     vitamin D3 (CHOLECALCIFEROL) 50 mcg (2000 units) tablet Take 1 tablet (50 mcg) by mouth daily 90 tablet 3       Allergies  No Known Allergies    Family hx Social hx   Family History   Problem Relation Age of Onset     Pulmonary Embolism Mother      Diabetes Father      Hypertension Father      Breast Cancer Sister 26        surgery only     Cancer Sister      Breast Cancer Sister      Mental Illness Sister         Bipolar     Coronary  Artery Disease Other         paternal aunt     Thyroid Disease Other         neice     Coronary Artery Disease Other      Cerebrovascular Disease Other      Thyroid Disease Other      Liver Disease No family hx of      Colon Cancer No family hx of       Social History     Tobacco Use     Smoking status: Current Every Day Smoker     Packs/day: 0.25     Years: 1.00     Pack years: 0.25     Types: Cigarettes     Start date: 2016     Smokeless tobacco: Never Used   Vaping Use     Vaping Use: Never used   Substance Use Topics     Alcohol use: Not Currently     Comment: Quit drinking when last hospitalized     Drug use: Not Currently     Types: Marijuana     Lives in [facility] with 2 children, aged 5 & 12, both healthy.  Boyfriend recently  of MI.  On social security (due to neuropathy after COVID).  Previously worked as CNA.     Review of systems  A 10-point review of systems was negative.    Examination  Gen- well, NAD, A+Ox3, normal color  Eye- EOMI, no scleral icterus  Skin- no rash or jaundice  Psych- normal mood    Laboratory  Lab Results   Component Value Date     02/15/2022     2021    POTASSIUM 3.5 02/15/2022    POTASSIUM 3.6 2021    CHLORIDE 107 02/15/2022    CHLORIDE 103 2021    CO2 25 02/15/2022    CO2 26 2021    BUN 4 02/15/2022    BUN 11 2021    CR 0.70 02/15/2022    CR 0.91 2021       Lab Results   Component Value Date    BILITOTAL 0.3 02/15/2022    BILITOTAL 0.7 2020    ALT 89 02/15/2022    ALT 18 2021     02/15/2022    AST 27 2021    ALKPHOS 155 02/15/2022    ALKPHOS 141 2020       Lab Results   Component Value Date    ALBUMIN 2.6 02/15/2022    ALBUMIN 2.9 2020    PROTTOTAL 7.8 02/15/2022    PROTTOTAL 7.4 2020        Lab Results   Component Value Date    WBC 4.1 02/15/2022    WBC 5.2 2020    HGB 13.4 02/15/2022    HGB 13.5 2020     02/15/2022     2020     02/15/2022      12/17/2020       Lab Results   Component Value Date    INR 1.56 11/04/2021    INR 1.22 10/06/2020      11/4/2021 10:17   Hepatitis A Antibody IgG Reactive (A)   Hep B Surface Agn Nonreactive   Hepatitis B Surface Antibody 394.60 (H)   Hepatitis C Antibody Nonreactive      11/4/2021 10:17 2/15/2022 11:57   Ferritin 66 77   Iron 108    Iron Binding Cap 327    Iron Saturation Index 33      Radiology  Abd U/S 11/4/2021 personally reviewed- increased echogenicity, normal spleen    Assessment  31-year-old female with history of laparoscopic sleeve gastrectomy and alcohol use disorder who presents for further evaluation and management of abnormal liver function tests secondary to non-alcoholic fatty liver disease.  Evaluation for other etiologies of chronic liver disease has returned negative.  No biochemical or radiographic evidence of cirrhosis.  No additional investigations indicated at this time.  Will monitor clinically for now as the patient continues her efforts with the [facility] to lose weight.    We discussed the importance of vaccination against COVID-19 with regard to prevention of serious infection, death and transmission to others.    Plan  1.  Check TTG ab, HBV CAb at next blood draw  2.  Weight loss with diet and exercise  3.  Follow-up in 6 months    Kishore Yu MD  Hepatology  [facility]

## 2022-03-07 NOTE — PROGRESS NOTES
Hilaria is a 31 year old who is being evaluated via a billable video visit.      How would you like to obtain your AVS? MyChart  If the video visit is dropped, the invitation should be resent by: Text to cell phone: [phone]  Will anyone else be joining your video visit? No      Video Start Time: 1301    Video-Visit Details    Type of service:  Video Visit    Video End Time:1:23 PM    Originating Location (pt. Location): Home    Distant Location (provider location):  [facility] [place]     Platform used for Video Visit: [facility]   ____________________________________________________________________________    [facility] Hep[facility]ology    New Patient Visit    Referring provider:  Crissy Madrigal      31 year old female    Chief complaint:  Abnormal liver function tests    HPI:  The patient presents for further evaluation and management of abnormal liver function tests.  Liver function tests were first noted to be abnormal last fall.  Testing for hepatitis B and C were notable only for immunity to hepatitis B.  Abdominal ultrasound showed increased echogenicity of the liver.    The patient is well today.  She has no symptoms related to liver disease.    The patient denies jaundice, abdominal distention, lower extremity edema, lethargy or confusion.    The patient denies melena, hematemesis or hematochezia.    The patient denies fever, sweats or chills.  She is not vaccinated against COVID-19 as she does not trust the vaccine.  She had COVID-19 in 11/2020.    Weight is stable.  The patient currently weighs 287 pounds and is 5 feet 6 inches in height.  BMI= 46.      History of gastric sleeve surgery in 2019 noted.  The patient weighed 330 pounds at that time and her weight decreased to 250 pounds.  Her weight had increased after having a PE (resulting in cardiac arrest) in 2019.  [facility]    History of obesity, KENDALL, type 2 diabetes and  hypertension noted.  No prior history of CAD, CVA, PAD, or hyperlipidemia.    The patient does not drink alcohol.  She last drank alcohol 12 months ago.  Prior to this, she was drinking 4 days a week and anywhere from 1 pint to 1 fifth of vodka on these occasions.  The patient quit drinking alcohol cold turkey.  She has not needed AA, one-on-one counseling or inpatient/outpatient treatment.  The patient has a history of alcoholic pancreatitis for which she was admitted to the hospital 2 or 3 times.    The patient is a current smoker.  She smokes approximately half pack of cigarettes per day and started smoking around the age of 16.    The patient denies any history of recreational drug use including marijuana, IN or IV drugs.      Medical hx Surgical hx   Past Medical History:   Diagnosis Date     Acute kidney injury (H) 2019     Acute massive pulmonary embolism (H) 2019     Acute pancreatitis 2018     Acute pancreatitis 2021    due to ETOH     Acute thoracic back pain 2021     ARAMIS (acute kidney injury) (H) 2019     Cardiac arrest, cause unspecified (H) 2019    massive pulmonary embolism with pulseless electrical activity cardiac arrest in May 2019 following gastric bypass in 2019      Depression with anxiety      GERD (gastroesophageal reflux disease)      History of alcohol use disorder      HTN (hypertension)      Infection due to 2019 novel coronavirus 2020    COVID19 infection in 2020     Ischemic colitis (H) 2019     Morbid obesity (H)      Scalp laceration, initial encounter 2020      Past Surgical History:   Procedure Laterality Date      SECTION       LAPAROSCOPIC GASTRIC SLEEVE N/A 2019    Procedure: Laparoscopic Sleeve Gastrectomy;  Surgeon: Luan Lopez MD;  Location: [facility] OR     ORTHOPEDIC SURGERY      2 knee meniscus surgery          Medications  Current Outpatient Medications   Medication Sig Dispense Refill      alcohol swab prep pads Use to swab area of injection/luke as directed. 100 each 11     ammonium lactate (AMLACTIN) 12 % external cream Apply topically 2 times daily 385 g 4     Biotin 5000 MCG TABS Take 1 tablet by mouth daily       blood glucose (NO BRAND SPECIFIED) test strip Use to test blood sugar 1 times daily or as directed. To accompany: Blood Glucose Monitor Brands: per insurance. 100 strip 11     blood glucose calibration (NO BRAND SPECIFIED) solution To accompany: Blood Glucose Monitor Brands: per insurance. 100 each 11     blood glucose monitoring (NO BRAND SPECIFIED) meter device kit Use to test blood sugar 1 times daily or as directed. Preferred blood glucose meter OR supplies to accompany: Blood Glucose Monitor Brands: per insurance. 1 kit 0     calcium Citrate-vitamin D 500-400 MG-UNIT CHEW Take 1 chew tab by mouth 3 times daily 90 tablet 11     diphenhydrAMINE (BENADRYL) 50 MG capsule Take 50 mg by mouth every 6 hours as needed for allergies or other (prior to infusion)       EPINEPHrine (ANY BX GENERIC EQUIV) 0.3 MG/0.3ML injection 2-pack        hydrOXYzine (ATARAX) 25 MG tablet Take 1 tablet (25 mg) by mouth every 6 hours as needed for anxiety 120 tablet 2     lisinopril (ZESTRIL) 10 MG tablet Take 1 tablet (10 mg) by mouth daily 90 tablet 0     medroxyPROGESTERone (DEPO-PROVERA) 150 MG/ML IM injection Inject 150 mg into the muscle every 3 months       metFORMIN (GLUCOPHAGE-XR) 500 MG 24 hr tablet Take 1 tablet (500 mg) by mouth 2 times daily (with meals) 120 tablet 0     methocarbamol (ROBAXIN) 500 MG tablet Take 1-2 tablets (500-1,000 mg) by mouth 3 times daily as needed for muscle spasms 75 tablet 1     Multiple Vitamins-Minerals (MULTIVITAMIN ADULT) CHEW Take 1 chew tab by mouth 2 times daily 60 tablet 11     OLANZapine (ZYPREXA) 10 MG tablet Take 1 tablet (10 mg) by mouth At Bedtime 30 tablet 3     omeprazole (PRILOSEC) 20 MG DR capsule Take 1 capsule (20 mg) by mouth 2 times daily 180  capsule 3     ondansetron (ZOFRAN-ODT) 4 MG ODT tab DISSOLVE 1 TABLET(4 MG) ON THE TONGUE EVERY 8 HOURS AS NEEDED FOR NAUSEA 30 tablet 1     polyethylene glycol (MIRALAX) 17 GM/SCOOP powder Take 17 g (1 capful) by mouth daily 850 g 3     potassium chloride ER (K-TAB/KLOR-CON) 10 MEQ CR tablet Take 2 tablets (20 mEq) by mouth 2 times daily 120 tablet 1     prazosin (MINIPRESS) 1 MG capsule TAKE 2 CAPSULES(2 MG) BY MOUTH AT BEDTIME 60 capsule 2     Pregabalin (LYRICA) 200 MG capsule Take 1 capsule (200 mg) by mouth 3 times daily 270 capsule 0     PRIVIGEN 20 GM/200ML SOLN        PRIVIGEN 40 GM/400ML SOLN        rivaroxaban ANTICOAGULANT (XARELTO ANTICOAGULANT) 20 MG TABS tablet Take 1 tablet (20 mg) by mouth daily (with dinner) 90 tablet 0     SODIUM CHLORIDE FLUSH 0.9 % flush        SOLU-CORTEF 100 MG injection        thin (NO BRAND SPECIFIED) lancets Use with lanceting device. To accompany: Blood Glucose Monitor Brands: per insurance. 100 each 11     venlafaxine (EFFEXOR-XR) 75 MG 24 hr capsule Take 3 capsules (225 mg) by mouth daily 90 capsule 3     vitamin B-Complex Take 1 tablet by mouth daily 90 tablet 3     vitamin C (ASCORBIC ACID) 1000 MG TABS Take 1 tablet (1,000 mg) by mouth daily 90 tablet 3     vitamin D3 (CHOLECALCIFEROL) 50 mcg (2000 units) tablet Take 1 tablet (50 mcg) by mouth daily 90 tablet 3       Allergies  No Known Allergies    Family hx Social hx   Family History   Problem Relation Age of Onset     Pulmonary Embolism Mother      Diabetes Father      Hypertension Father      Breast Cancer Sister 26        surgery only     Cancer Sister      Breast Cancer Sister      Mental Illness Sister         Bipolar     Coronary Artery Disease Other         paternal aunt     Thyroid Disease Other         neice     Coronary Artery Disease Other      Cerebrovascular Disease Other      Thyroid Disease Other      Liver Disease No family hx of      Colon Cancer No family hx of       Social History     Tobacco Use      Smoking status: Current Every Day Smoker     Packs/day: 0.25     Years: 1.00     Pack years: 0.25     Types: Cigarettes     Start date: 2016     Smokeless tobacco: Never Used   Vaping Use     Vaping Use: Never used   Substance Use Topics     Alcohol use: Not Currently     Comment: Quit drinking when last hospitalized     Drug use: Not Currently     Types: Marijuana     Lives in [facility] [place] with 2 children, aged 5 & 12, both healthy.  Boyfriend recently  of MI.  On social security (due to neuropathy after COVID).  Previously worked as CNA.     Review of systems  A 10-point review of systems was negative.    Examination  Gen- well, NAD, A+Ox3, normal color  Eye- EOMI, no scleral icterus  Skin- no rash or jaundice  Psych- normal mood    Laboratory  Lab Results   Component Value Date     02/15/2022     2021    POTASSIUM 3.5 02/15/2022    POTASSIUM 3.6 2021    CHLORIDE 107 02/15/2022    CHLORIDE 103 2021    CO2 25 02/15/2022    CO2 26 2021    BUN 4 02/15/2022    BUN 11 2021    CR 0.70 02/15/2022    CR 0.91 2021       Lab Results   Component Value Date    BILITOTAL 0.3 02/15/2022    BILITOTAL 0.7 2020    ALT 89 02/15/2022    ALT 18 2021     02/15/2022    AST 27 2021    ALKPHOS 155 02/15/2022    ALKPHOS 141 2020       Lab Results   Component Value Date    ALBUMIN 2.6 02/15/2022    ALBUMIN 2.9 2020    PROTTOTAL 7.8 02/15/2022    PROTTOTAL 7.4 2020        Lab Results   Component Value Date    WBC 4.1 02/15/2022    WBC 5.2 2020    HGB 13.4 02/15/2022    HGB 13.5 2020     02/15/2022     2020     02/15/2022     2020       Lab Results   Component Value Date    INR 1.56 2021    INR 1.22 10/06/2020      2021 10:17   Hepatitis A Antibody IgG Reactive (A)   Hep B Surface Agn Nonreactive   Hepatitis B Surface Antibody 394.60 (H)   Hepatitis C Antibody Nonreactive       11/4/2021 10:17 2/15/2022 11:57   Ferritin 66 77   Iron 108    Iron Binding Cap 327    Iron Saturation Index 33      Radiology  Abd U/S 11/4/2021 personally reviewed- increased echogenicity, normal spleen    Assessment  31-year-old female with history of laparoscopic sleeve gastrectomy and alcohol use disorder who presents for further evaluation and management of abnormal liver function tests secondary to non-alcoholic fatty liver disease.  Evaluation for other etiologies of chronic liver disease has returned negative.  No biochemical or radiographic evidence of cirrhosis.  No additional investigations indicated at this time.  Will monitor clinically for now as the patient continues her efforts with the [facility] to lose weight.    We discussed the importance of vaccination against COVID-19 with regard to prevention of serious infection, death and transmission to others.    Plan  1.  Check TTG ab, HBV CAb at next blood draw  2.  Weight loss with diet and exercise  3.  Follow-up in 6 months    Kishore Yu MD  Hepatology  [facility]

## 2022-03-08 ENCOUNTER — HOME INFUSION (PRE-WILLOW HOME INFUSION) (OUTPATIENT)
Dept: PHARMACY | Facility: CLINIC | Age: 32
End: 2022-03-08

## 2022-03-08 NOTE — PROGRESS NOTES
This is a recent snapshot of the patient's [facility] medical record.  For current drug dose and complete information and questions, call [phone] or In Basket pool, fv home infusion ([id number])  CSN Number:  [id number]

## 2022-03-09 ENCOUNTER — HOME INFUSION (PRE-WILLOW HOME INFUSION) (OUTPATIENT)
Dept: PHARMACY | Facility: CLINIC | Age: 32
End: 2022-03-09

## 2022-03-09 NOTE — PROGRESS NOTES
Hilaria Bonner is a 31 year old year old who is being evaluated via a billable telephone visit.      What phone number would you like to be contacted at? [phone]  How would you like to obtain your AVS? Ferdinand Solomon NREMT            Pt not seen today due to feeling tired and seeing MTM recently.  She would like to reschedule.    Joanne Sterling PA-C

## 2022-03-10 ENCOUNTER — HOME INFUSION (PRE-WILLOW HOME INFUSION) (OUTPATIENT)
Dept: PHARMACY | Facility: CLINIC | Age: 32
End: 2022-03-10

## 2022-03-10 ENCOUNTER — DOCUMENTATION ONLY (OUTPATIENT)
Dept: PHARMACY | Facility: CLINIC | Age: 32
End: 2022-03-10

## 2022-03-10 ENCOUNTER — VIRTUAL VISIT (OUTPATIENT)
Dept: ENDOCRINOLOGY | Facility: CLINIC | Age: 32
End: 2022-03-10
Payer: COMMERCIAL

## 2022-03-10 ENCOUNTER — TELEPHONE (OUTPATIENT)
Dept: SURGERY | Facility: CLINIC | Age: 32
End: 2022-03-10

## 2022-03-10 VITALS — HEIGHT: 67 IN | BODY MASS INDEX: 45.04 KG/M2 | WEIGHT: 287 LBS

## 2022-03-10 DIAGNOSIS — Z98.84 S/P LAPAROSCOPIC SLEEVE GASTRECTOMY: Primary | ICD-10-CM

## 2022-03-10 PROCEDURE — 99207 PR NO CHARGE LOS: CPT | Performed by: PHYSICIAN ASSISTANT

## 2022-03-10 NOTE — Clinical Note
Can you please call pt to reschedule appt from today that she had to cancel for about 1 month.  RBS or Return MWM is fine.  Ok to use 15 min slot if needed

## 2022-03-10 NOTE — LETTER
Date:March 10, 2022      Provider requested that no letter be sent. Do not send.       [facility]

## 2022-03-10 NOTE — LETTER
3/10/2022       RE: Hilaria Bonner  [address]  [address]  [place] [address]     Dear Colleague,    Thank you for referring your patient, Hilaria Bonner, to the [facility] [place] at [facility]. Please see a copy of my visit note below.    Hilaria Bonner is a 31 year old year old who is being evaluated via a billable telephone visit.      What phone number would you like to be contacted at? [phone]  How would you like to obtain your AVS? Ferdinand Solomon NREMT            Pt not seen today due to feeling tired and seeing MTM recently.  She would like to reschedule.    Joanne Sterling PA-C      Again, thank you for allowing me to participate in the care of your patient.      Sincerely,    Joanne Sterling PA-C

## 2022-03-10 NOTE — PROGRESS NOTES
Skilled Nurse visit in the  patient home to administer Privigen 60g.  No recent elevated temperature, fever, chills, productive cough, coughing for 3 weeks or longer or hemoptysis, abnormal vital signs, night sweats, chest pain. No  decrease in your appetite, unexplained weight loss or fatigue.  No other new onset medical symptoms.  Current weight 287 lbs.  PIV placed left hand, 3 attempt/s.  Pre medicated with Acetaminophen 650 mg by mouth, 30 minutes prior to infusion.  Diphenhydramine 50 mg by mouth, 30 minutes prior to infusion.  Hydrocortisone 100 mg IV push over 2-5 minutes, 30 minutes prior to infusion.     Infusion completed without complication or reaction. Pt reports therapy is effective in managing symptoms related to therapy.      Love Ribera RN, BSN  [facility]  [phone]  [email or web address]

## 2022-03-14 ENCOUNTER — TELEPHONE (OUTPATIENT)
Dept: ENDOCRINOLOGY | Facility: CLINIC | Age: 32
End: 2022-03-14
Payer: COMMERCIAL

## 2022-03-14 NOTE — PROGRESS NOTES
This is a recent snapshot of the patient's [facility] medical record.  For current drug dose and complete information and questions, call [phone] or In Basket pool, fv home infusion ([id number])  CSN Number:  [id number]

## 2022-03-15 ENCOUNTER — TELEPHONE (OUTPATIENT)
Dept: FAMILY MEDICINE | Facility: CLINIC | Age: 32
End: 2022-03-15
Payer: COMMERCIAL

## 2022-03-15 ENCOUNTER — PATIENT OUTREACH (OUTPATIENT)
Dept: CARE COORDINATION | Facility: CLINIC | Age: 32
End: 2022-03-15
Payer: COMMERCIAL

## 2022-03-15 DIAGNOSIS — S22.070D COMPRESSION FRACTURE OF T9 VERTEBRA WITH ROUTINE HEALING, SUBSEQUENT ENCOUNTER: ICD-10-CM

## 2022-03-15 NOTE — PROGRESS NOTES
CC SW attempted 2nd outreach on 3/4 and left message to return call.     Next outreach due: 3/24/22

## 2022-03-15 NOTE — TELEPHONE ENCOUNTER
Patient requested through her [facility] referral for psychiatry for sons- will need to schedule virtual visits for her sons

## 2022-03-16 ENCOUNTER — ALLIED HEALTH/NURSE VISIT (OUTPATIENT)
Dept: FAMILY MEDICINE | Facility: CLINIC | Age: 32
End: 2022-03-16
Payer: COMMERCIAL

## 2022-03-16 DIAGNOSIS — E53.8 VITAMIN B12 DEFICIENCY (NON ANEMIC): Primary | ICD-10-CM

## 2022-03-16 PROCEDURE — 96372 THER/PROPH/DIAG INJ SC/IM: CPT | Performed by: PHYSICIAN ASSISTANT

## 2022-03-16 PROCEDURE — 99207 PR NO CHARGE NURSE ONLY: CPT

## 2022-03-16 RX ORDER — TRAMADOL HYDROCHLORIDE 50 MG/1
TABLET ORAL
Qty: 20 TABLET | OUTPATIENT
Start: 2022-03-16

## 2022-03-16 RX ADMIN — CYANOCOBALAMIN 1000 MCG: 1000 INJECTION, SOLUTION INTRAMUSCULAR; SUBCUTANEOUS at 10:17

## 2022-03-16 NOTE — PROGRESS NOTES
Clinic Administered Medication Documentation    Administrations This Visit     cyanocobalamin injection 1,000 mcg     Admin Date  03/16/2022 Action  Given Dose  1,000 mcg Route  Intramuscular Site  Right Deltoid Administered By  Yola Ríos CMA    Ordering Provider: Crissy Madrigal PA-C    Patient Supplied?: No                  Injectable Medication Documentation    Patient was given Cyanocobalamin (B-12). Prior to medication administration, verified patients identity using patient s name and date of birth. Please see MAR and medication order for additional information. Patient instructed to stay in clinic after the injection but patient declined.      Was entire vial of medication used? Yes  Vial/Syringe: Single dose vial  Expiration Date:  06/2023  Was this medication supplied by the patient? No   Yola Ríos CMA

## 2022-03-16 NOTE — TELEPHONE ENCOUNTER
Called patient regarding message below - Pt states this was automated from the pharmacy. She does not need this. Pt just switched pharmacies and they sent automatic requests.     Pt stating she is aware she will need to follow up with pain clinic.     No further questions at this time.     Tasha Nolan RN, BSN  [facility]

## 2022-03-16 NOTE — TELEPHONE ENCOUNTER
Routing refill request to provider for review/approval because:  Drug/ supplies not on the G refill protocol     Abril Peña BSN, RN

## 2022-03-23 ENCOUNTER — PATIENT OUTREACH (OUTPATIENT)
Dept: NURSING | Facility: CLINIC | Age: 32
End: 2022-03-23
Payer: COMMERCIAL

## 2022-03-23 ENCOUNTER — PATIENT OUTREACH (OUTPATIENT)
Dept: CARE COORDINATION | Facility: CLINIC | Age: 32
End: 2022-03-23
Payer: COMMERCIAL

## 2022-03-23 NOTE — PROGRESS NOTES
Clinic Care Coordination Contact    Follow Up Progress Note      Assessment: [facility] received a voicemail from patient. [facility] contacted her back. She would like to continue working with Care Coordination. She stated that her boyfriend passed away last month and she has been having a hard time and hasn't been answering the phone much. Patient stated that it's been getting difficult to keep track of all the medications she has to refill. She stated that they are all needing to be refilled on different days and her mom and aunt help to pick them up, but it's been a lot. She asked if there was anything she could use to help this. [facility] reviewed the [facility]. Provided the contact information in case she wants to see if that program would help at all. Patient stated she would call. She will also continue looking on [employer] Website and will call [facility] for additional housing resources.     Care Gaps:    Health Maintenance Due   Topic Date Due     ADVANCE CARE PLANNING  Never done     Pneumococcal Vaccine: Pediatrics (0 to 5 Years) and At-Risk Patients (6 to 64 Years) (1 of 2 - PPSV23) Never done     COVID-19 Vaccine (1) Never done     HEPATITIS B IMMUNIZATION (1 of 3 - Risk 3-dose series) Never done     URINE DRUG SCREEN  08/20/2021     PREVENTIVE CARE VISIT  09/30/2021       Goals addressed this encounter:   Goals Addressed                    This Visit's Progress       Patient Stated       1. Psychosocial (pt-stated)         Goal Statement: I would like to move  Date Goal set: 10/19/2021  Barriers: Unsure what disability payments will be each month; Wants a three bedroom townhome or house to rent instead of an apartment- not sure if this will be financially feasible  Strengths: Patient is a great self advocate; Patient is enrolled in Care Coordination   Date to Achieve By: 6/2022  Patient expressed understanding of goal: Yes  Action steps to achieve this goal:  1. I will wait to see what  my disability payments will be  2. I will review living options within my price range on [email or web address] once I get my monthly disability payments figured out  3. I will tour living options that are suitable for me and my children  4. I will move in to a new place.           2. Other (pt-stated)         Goal Statement: Would like to try getting prescriptions mailed  Date Goal set: 3/23/2022  Barriers: Unsure if insurance covers this  Strengths: Patient is a great self advocate; Patient is enrolled in Care Coordination   Date to Achieve By: 6/2022  Patient expressed understanding of goal: Yes  Action steps to achieve this goal:  1. I will contact [facility] to inquire further about the program  2. I will sign up to [facility], if desired  3. I will have my medications mailed to me              Intervention/Education provided during outreach: Open ended questions     Outreach Frequency: monthly      Plan:   CHW will contact patient in one month. SWCC will review chart in 6 weeks, per standard workflow.     IVET Bowens  Primary Care Clinic- Social Work Care Coordinator  [facility] [place] [facility]  Ph: [phone]  3/23/2022 3:53 PM

## 2022-03-23 NOTE — PROGRESS NOTES
Clinic Care Coordination Contact  [facility]/Voicemail       Clinical Data: Care Coordinator Outreach  Outreach attempted x 3.  Left message on patient's voicemail with call back information and requested return call.  Plan: Care Coordinator will send unable to contact letter with care coordinator contact information via [employer]. Care Coordinator will do no further outreaches at this time.      IVET Bowens  Primary Care Clinic- Social Work Care Coordinator  [facility]  Ph: [phone]  3/23/2022 12:30 PM

## 2022-03-23 NOTE — LETTER
[facility] C[facility]ORDIN[facility]  [address]  [place] [address]    March 23, 2022    Hilaria Bonner  [address]  [address]  [place] [address]      Dear Hilaria,    I have been attempting to reach you since our last contact. I would like to continue to work with you and provide any additional support you may need on achieving your health care related goals. I would appreciate if you would give me a call at [phone] to let me know if you would like to continue working together. I know that there are many things that can affect our ability to communicate and I hope we can continue to work together.    All of us at the [facility] are invested in your health and are here to assist you in meeting your goals.     Sincerely,    Albino Pratt, JONATHANW

## 2022-03-24 ENCOUNTER — VIRTUAL VISIT (OUTPATIENT)
Dept: EDUCATION SERVICES | Facility: CLINIC | Age: 32
End: 2022-03-24
Payer: COMMERCIAL

## 2022-03-24 DIAGNOSIS — Z91.199 NO-SHOW FOR APPOINTMENT: Primary | ICD-10-CM

## 2022-03-24 DIAGNOSIS — E11.9 TYPE 2 DIABETES MELLITUS WITHOUT COMPLICATION, WITHOUT LONG-TERM CURRENT USE OF INSULIN (H): ICD-10-CM

## 2022-03-24 PROCEDURE — 98966 PH1 ASSMT&MGMT NQHP 5-10: CPT | Mod: 95 | Performed by: DIETITIAN, REGISTERED

## 2022-03-24 NOTE — PROGRESS NOTES
Diabetes Self-Management Education & Support    Presents for: Follow-up    Type of Visit: Telephone Visit    How would patient like to obtain AVS? Ferdinand    ASSESSMENT:    Hilaria states that her diabetes has been stable. She states that her BG have been in goal majority of the time. She states that she is still processing her boyfriends death and has been spending a lot of time laying in her bed. She is working with the weight management program and has a dietitian that she sees. She has been working on eating regular meals.       Patient's most recent   Lab Results   Component Value Date    A1C 6.1 02/15/2022    A1C 5.0 03/02/2021    is meeting goal of <7.0    Diabetes knowledge and skills assessment:   Patient is knowledgeable in diabetes management concepts related to: Healthy Eating, Being Active, Monitoring, Taking Medication, Problem Solving, Reducing Risks, Healthy Coping    Continue education with the following diabetes management concepts: Healthy Eating, Being Active, Monitoring, Taking Medication, Problem Solving, Reducing Risks and Healthy Coping    Based on learning assessment above, most appropriate setting for further diabetes education would be: Individual setting.    INTERVENTIONS:    Education provided today on:  AADE Self-Care Behaviors:  Healthy Eating: carbohydrate counting, consistency in amount, composition, and timing of food intake and portion control  Monitoring: purpose, log and interpret results and individual blood glucose targets    Opportunities for ongoing education and support in diabetes-self management were discussed. Pt verbalized understanding of concepts discussed and recommendations provided today.       Education Materials Provided:  No new materials provided today          PLAN    Continue with current plan    Topics to cover at upcoming visits: Healthy Eating, Being Active, Monitoring, Taking Medication, Problem Solving, Reducing Risks and Healthy Coping  Follow-up: no  "follow-up planned, recommended that patient reach out if BG increase    See Goals Section for co-developed, patient-stated behavior change goals.  AVS provided to patient today.          SUBJECTIVE / OBJECTIVE:  Presents for: Follow-up  Accompanied by: Self  Diabetes education in the past 24mo: Yes  Focus of Visit: Monitoring, Taking Medication, Healthy Eating  Diabetes type: Type 2  Date of diagnosis: October 2021  Disease course: Stable  Difficulty affording diabetes medication?: No  Difficulty affording diabetes testing supplies?: No  Cultural Influences/Ethnic Background:  American    Diabetes Symptoms & Complications:  Fatigue: Yes  Neuropathy: Sometimes (since last April, since Covid dx)  Polydipsia: Yes  Polyphagia: Sometimes  Polyuria: No  Visual change: Yes  Slow healing wounds: Yes  Symptom course: Stable  Weight trend: Stable  Complications assessed today?: No    Patient Problem List and Family Medical History reviewed for relevant medical history, current medical status, and diabetes risk factors.    Vitals:  There were no vitals taken for this visit.  Estimated body mass index is 44.95 kg/m  as calculated from the following:    Height as of 3/10/22: 1.702 m (5' 7\").    Weight as of 3/10/22: 130.2 kg (287 lb).   Last 3 BP:   BP Readings from Last 3 Encounters:   02/15/22 119/86   01/12/22 (!) 140/90   12/29/21 120/84       History   Smoking Status     Current Every Day Smoker     Packs/day: 0.25     Years: 1.00     Types: Cigarettes     Start date: 11/20/2016   Smokeless Tobacco     Never Used       Labs:  Lab Results   Component Value Date    A1C 6.1 02/15/2022    A1C 5.0 03/02/2021     Lab Results   Component Value Date     02/15/2022     06/11/2021     Lab Results   Component Value Date    LDL 82 02/15/2022     12/17/2020     HDL Cholesterol   Date Value Ref Range Status   12/17/2020 68 >49 mg/dL Final     Direct Measure HDL   Date Value Ref Range Status   02/15/2022 77 >=50 mg/dL " Final   ]  GFR Estimate   Date Value Ref Range Status   02/15/2022 >90 >60 mL/min/1.73m2 Final     Comment:     Effective December 21, 2021 eGFRcr in adults is calculated using the 2021 CKD-EPI creatinine equation which includes age and gender (Zelalem clay al., NEJM, DOI: 10.1056/OIYRnu8294429)   06/11/2021 85 >60 mL/min/[1.73_m2] Final     Comment:     Non  GFR Calc  Starting 12/18/2018, serum creatinine based estimated GFR (eGFR) will be   calculated using the Chronic Kidney Disease Epidemiology Collaboration   (CKD-EPI) equation.       GFR Estimate If Black   Date Value Ref Range Status   06/11/2021 >90 >60 mL/min/[1.73_m2] Final     Comment:      GFR Calc  Starting 12/18/2018, serum creatinine based estimated GFR (eGFR) will be   calculated using the Chronic Kidney Disease Epidemiology Collaboration   (CKD-EPI) equation.       Lab Results   Component Value Date    CR 0.70 02/15/2022    CR 0.91 06/11/2021     No results found for: MICROALBUMIN    Healthy Eating:  Healthy Eating Assessed Today: Yes  Cultural/Rastafari diet restrictions?: No  Meals include: Breakfast, Lunch, Dinner  Breakfast: drinking water, not usually hungry in the morning OR protein shake - [employer]  Lunch: Bowl of Noodles OR sandwich - meat(turkey or chicken, alonso and mustard), with chips and pickle, sometimes with cucumbers OR skips  Dinner: chicken (usually drum sticks, leg quartesrs, chicken wings, chicken breast), starch (macaroni cheese, potatoes) and vegetables (broccoli, peas, green beans, corn  Snacks: chips (1 bag/day) - hot cheetos or lays, pickles, occasional cookie or dessert, not much of a sweet tooth  Other: doesn't like yogurt and bananas. switched to diet pops. Having a hard time counting carbohdyrates  Beverages: Diet soda, Water, Energy drinks (crystal light flavoring.)  Has patient met with a dietitian in the past?: No    Being Active:  Being Active Assessed Today: Yes  Exercise:: Currently not  exercising (has had some depression lately, wanting to just lay down)  Days per week of moderate to strenuous exercise (like a brisk walk): 0    Monitoring:  Monitoring Assessed Today: Yes  Did patient bring glucose meter to appointment? : Yes  Times checking blood sugar at home (number): 1  Times checking blood sugar at home (per): Day    Glucose data:  Most in target, per patient      Taking Medications:  Diabetes Medication(s)     Biguanides       metFORMIN (GLUCOPHAGE-XR) 500 MG 24 hr tablet    Take 1 tablet (500 mg) by mouth 2 times daily (with meals)          Taking Medication Assessed Today: Yes  Current Treatments: Oral Medication (taken by mouth)  Problems taking diabetes medications regularly?: No  Diabetes medication side effects?: No    Problem Solving:  Problem Solving Assessed Today: Yes  Is the patient at risk for hypoglycemia?: No  Is the patient at risk for DKA?: No              Reducing Risks:  Reducing Risks Assessed Today: Yes  Diabetes Risks: Sedentary Lifestyle, Ethnicity, Family History  CAD Risks: Diabetes Mellitus, Obesity, Sedentary lifestyle    Healthy Coping:  Healthy Coping Assessed Today: Yes  Patient Activation Measure Survey Score:  MARIAM Score (Last Two) 6/13/2019   MARIAM Raw Score 24   Activation Score 40.9   MARIAM Level 1       Janki Guerrero, RD, LD, CDE  Time Spent: 6 minutes  Encounter Type: Individual        Any diabetes medication dose changes were made via the Certified Diabetes Care & Education Protocol in collaboration with the patient's referring provider. A copy of this encounter was shared with the provider.

## 2022-03-24 NOTE — LETTER
3/24/2022         RE: Hilaria Bonner  [address]  [address]  [facility] [address]        Dear Colleague,    Thank you for referring your patient, Hilaria Bonner, to the [facility]. Please see a copy of my visit note below.    Diabetes Self-Management Education & Support    Presents for: Follow-up    Type of Visit: Telephone Visit    How would patient like to obtain AVS? MyChart    ASSESSMENT:    Hilaria states that her diabetes has been stable. She states that her BG have been in goal majority of the time. She states that she is still processing her boyfriends death and has been spending a lot of time laying in her bed. She is working with the weight management program and has a dietitian that she sees. She has been working on eating regular meals.       Patient's most recent   Lab Results   Component Value Date    A1C 6.1 02/15/2022    A1C 5.0 03/02/2021    is meeting goal of <7.0    Diabetes knowledge and skills assessment:   Patient is knowledgeable in diabetes management concepts related to: Healthy Eating, Being Active, Monitoring, Taking Medication, Problem Solving, Reducing Risks, Healthy Coping    Continue education with the following diabetes management concepts: Healthy Eating, Being Active, Monitoring, Taking Medication, Problem Solving, Reducing Risks and Healthy Coping    Based on learning assessment above, most appropriate setting for further diabetes education would be: Individual setting.    INTERVENTIONS:    Education provided today on:  AADE Self-Care Behaviors:  Healthy Eating: carbohydrate counting, consistency in amount, composition, and timing of food intake and portion control  Monitoring: purpose, log and interpret results and individual blood glucose targets    Opportunities for ongoing education and support in diabetes-self management were discussed. Pt verbalized understanding of concepts discussed and recommendations provided today.    "    Education Materials Provided:  No new materials provided today          PLAN    Continue with current plan    Topics to cover at upcoming visits: Healthy Eating, Being Active, Monitoring, Taking Medication, Problem Solving, Reducing Risks and Healthy Coping  Follow-up: no follow-up planned, recommended that patient reach out if BG increase    See Goals Section for co-developed, patient-stated behavior change goals.  AVS provided to patient today.          SUBJECTIVE / OBJECTIVE:  Presents for: Follow-up  Accompanied by: Self  Diabetes education in the past 24mo: Yes  Focus of Visit: Monitoring, Taking Medication, Healthy Eating  Diabetes type: Type 2  Date of diagnosis: October 2021  Disease course: Stable  Difficulty affording diabetes medication?: No  Difficulty affording diabetes testing supplies?: No  Cultural Influences/Ethnic Background:  American    Diabetes Symptoms & Complications:  Fatigue: Yes  Neuropathy: Sometimes (since last April, since Covid dx)  Polydipsia: Yes  Polyphagia: Sometimes  Polyuria: No  Visual change: Yes  Slow healing wounds: Yes  Symptom course: Stable  Weight trend: Stable  Complications assessed today?: No    Patient Problem List and Family Medical History reviewed for relevant medical history, current medical status, and diabetes risk factors.    Vitals:  There were no vitals taken for this visit.  Estimated body mass index is 44.95 kg/m  as calculated from the following:    Height as of 3/10/22: 1.702 m (5' 7\").    Weight as of 3/10/22: 130.2 kg (287 lb).   Last 3 BP:   BP Readings from Last 3 Encounters:   02/15/22 119/86   01/12/22 (!) 140/90   12/29/21 120/84       History   Smoking Status     Current Every Day Smoker     Packs/day: 0.25     Years: 1.00     Types: Cigarettes     Start date: 11/20/2016   Smokeless Tobacco     Never Used       Labs:  Lab Results   Component Value Date    A1C 6.1 02/15/2022    A1C 5.0 03/02/2021     Lab Results   Component Value Date     " 02/15/2022     06/11/2021     Lab Results   Component Value Date    LDL 82 02/15/2022     12/17/2020     HDL Cholesterol   Date Value Ref Range Status   12/17/2020 68 >49 mg/dL Final     Direct Measure HDL   Date Value Ref Range Status   02/15/2022 77 >=50 mg/dL Final   ]  GFR Estimate   Date Value Ref Range Status   02/15/2022 >90 >60 mL/min/1.73m2 Final     Comment:     Effective December 21, 2021 eGFRcr in adults is calculated using the 2021 CKD-EPI creatinine equation which includes age and gender (Zelalem et al., NEJM, DOI: 10.1056/GQVAmb5003677)   06/11/2021 85 >60 mL/min/[1.73_m2] Final     Comment:     Non  GFR Calc  Starting 12/18/2018, serum creatinine based estimated GFR (eGFR) will be   calculated using the Chronic Kidney Disease Epidemiology Collaboration   (CKD-EPI) equation.       GFR Estimate If Black   Date Value Ref Range Status   06/11/2021 >90 >60 mL/min/[1.73_m2] Final     Comment:      GFR Calc  Starting 12/18/2018, serum creatinine based estimated GFR (eGFR) will be   calculated using the Chronic Kidney Disease Epidemiology Collaboration   (CKD-EPI) equation.       Lab Results   Component Value Date    CR 0.70 02/15/2022    CR 0.91 06/11/2021     No results found for: MICROALBUMIN    Healthy Eating:  Healthy Eating Assessed Today: Yes  Cultural/Jew diet restrictions?: No  Meals include: Breakfast, Lunch, Dinner  Breakfast: drinking water, not usually hungry in the morning OR protein shake - [employer]  Lunch: Bowl of Noodles OR sandwich - meat(turkey or chicken, alonso and mustard), with chips and pickle, sometimes with cucumbers OR skips  Dinner: chicken (usually drum sticks, leg quartesrs, chicken wings, chicken breast), starch (macaroni cheese, potatoes) and vegetables (broccoli, peas, green beans, corn  Snacks: chips (1 bag/day) - hot cheetos or lays, pickles, occasional cookie or dessert, not much of a sweet tooth  Other: doesn't like yogurt  and bananas. switched to diet pops. Having a hard time counting carbohdyrates  Beverages: Diet soda, Water, Energy drinks (crystal light flavoring.)  Has patient met with a dietitian in the past?: No    Being Active:  Being Active Assessed Today: Yes  Exercise:: Currently not exercising (has had some depression lately, wanting to just lay down)  Days per week of moderate to strenuous exercise (like a brisk walk): 0    Monitoring:  Monitoring Assessed Today: Yes  Did patient bring glucose meter to appointment? : Yes  Times checking blood sugar at home (number): 1  Times checking blood sugar at home (per): Day    Glucose data:  Most in target, per patient      Taking Medications:  Diabetes Medication(s)     Biguanides       metFORMIN (GLUCOPHAGE-XR) 500 MG 24 hr tablet    Take 1 tablet (500 mg) by mouth 2 times daily (with meals)          Taking Medication Assessed Today: Yes  Current Treatments: Oral Medication (taken by mouth)  Problems taking diabetes medications regularly?: No  Diabetes medication side effects?: No    Problem Solving:  Problem Solving Assessed Today: Yes  Is the patient at risk for hypoglycemia?: No  Is the patient at risk for DKA?: No              Reducing Risks:  Reducing Risks Assessed Today: Yes  Diabetes Risks: Sedentary Lifestyle, Ethnicity, Family History  CAD Risks: Diabetes Mellitus, Obesity, Sedentary lifestyle    Healthy Coping:  Healthy Coping Assessed Today: Yes  Patient Activation Measure Survey Score:  MARIAM Score (Last Two) 6/13/2019   MARIAM Raw Score 24   Activation Score 40.9   MARIAM Level 1       Janki Guerrero, RD, LD, CDE  Time Spent: 6 minutes  Encounter Type: Individual        Any diabetes medication dose changes were made via the Certified Diabetes Care & Education Protocol in collaboration with the patient's referring provider. A copy of this encounter was shared with the provider.

## 2022-03-28 ENCOUNTER — HOME INFUSION (PRE-WILLOW HOME INFUSION) (OUTPATIENT)
Dept: PHARMACY | Facility: CLINIC | Age: 32
End: 2022-03-28

## 2022-03-30 ENCOUNTER — HOME INFUSION (PRE-WILLOW HOME INFUSION) (OUTPATIENT)
Dept: PHARMACY | Facility: CLINIC | Age: 32
End: 2022-03-30

## 2022-03-30 ENCOUNTER — DOCUMENTATION ONLY (OUTPATIENT)
Dept: PHARMACY | Facility: CLINIC | Age: 32
End: 2022-03-30
Payer: COMMERCIAL

## 2022-03-30 NOTE — PROGRESS NOTES
Skilled Nurse visit in the  patient home to administer privigen 60 g in 600 mls.  No recent elevated temperature, fever, chills, productive cough, coughing for 3 weeks or longer or hemoptysis, abnormal vital signs, night sweats, chest pain. No  decrease in your appetite, unexplained weight loss or fatigue.  No other new onset medical symptoms.  Current weight 287.  PIV placed in right hand, 1 attempt/s.  Pre medicated with acetaminophen 325, benadryl 25 mg PO, hydrocortisone 100 mg in 10 ml IV push. Labs drawn none. Infusion completed without complication or reaction. Pt reports therapy is effective in managing symptoms related to therapy.

## 2022-04-13 NOTE — PROGRESS NOTES
This is a recent snapshot of the patient's [facility] medical record.  For current drug dose and complete information and questions, call [phone] or In Basket pool, fv home infusion ([id number])  CSN Number:  [id number]

## 2022-04-18 ENCOUNTER — HOME INFUSION (PRE-WILLOW HOME INFUSION) (OUTPATIENT)
Dept: PHARMACY | Facility: CLINIC | Age: 32
End: 2022-04-18
Payer: COMMERCIAL

## 2022-04-19 NOTE — PROGRESS NOTES
This is a recent snapshot of the patient's [facility] medical record.  For current drug dose and complete information and questions, call [phone] or In [facility] pool, fv home infusion ([id number])  CSN Number:  [id number]

## 2022-04-20 ENCOUNTER — HOME INFUSION (PRE-WILLOW HOME INFUSION) (OUTPATIENT)
Dept: PHARMACY | Facility: CLINIC | Age: 32
End: 2022-04-20
Payer: COMMERCIAL

## 2022-04-20 ENCOUNTER — DOCUMENTATION ONLY (OUTPATIENT)
Dept: PHARMACY | Facility: CLINIC | Age: 32
End: 2022-04-20
Payer: COMMERCIAL

## 2022-04-20 NOTE — PROGRESS NOTES
Skilled Nurse visit in the Patient Home to administer Privigen 60g.  No recent elevated temperature, fever, chills, productive cough, coughing for 3 weeks or longer or hemoptysis, abnormal vital signs, night sweats, chest pain. No  decrease in your appetite, unexplained weight loss or fatigue.  No other new onset medical symptoms.  Current weight 287 lbs.  Peripheral IVright Hand, 1 attempt. Infusion completed without complication or reaction. Pt reports therapy is effective in managing symptoms related to therapy.      Love Ribera RN, BSN  [facility]  [phone]  [email or web address]

## 2022-04-22 NOTE — PROGRESS NOTES
This is a recent snapshot of the patient's [facility] medical record.  For current drug dose and complete information and questions, call [phone] or In Basket pool, fv home infusion ([id number])  CSN Number:  [id number]

## 2022-04-26 NOTE — PROGRESS NOTES
This is a recent snapshot of the patient's [facility] medical record.  For current drug dose and complete information and questions, call [phone] or In Basket pool, fv home infusion ([id number])  CSN Number:  [id number]

## 2022-05-03 ENCOUNTER — PATIENT OUTREACH (OUTPATIENT)
Dept: CARE COORDINATION | Facility: CLINIC | Age: 32
End: 2022-05-03
Payer: COMMERCIAL

## 2022-05-03 NOTE — PROGRESS NOTES
[facility]/Voicemail       Clinical Data: Care Coordinator Outreach  Outreach attempted x 1.  Left message on patient's voicemail with call back information and requested return call.  Plan:   Care Coordinator will try to reach patient again in 3-5 business days.  Next outreach due 5/10/22

## 2022-05-09 ENCOUNTER — OFFICE VISIT (OUTPATIENT)
Dept: NEUROLOGY | Facility: CLINIC | Age: 32
End: 2022-05-09
Payer: COMMERCIAL

## 2022-05-09 ENCOUNTER — HOME INFUSION (PRE-WILLOW HOME INFUSION) (OUTPATIENT)
Dept: PHARMACY | Facility: CLINIC | Age: 32
End: 2022-05-09

## 2022-05-09 VITALS
RESPIRATION RATE: 17 BRPM | DIASTOLIC BLOOD PRESSURE: 96 MMHG | SYSTOLIC BLOOD PRESSURE: 134 MMHG | OXYGEN SATURATION: 95 % | HEART RATE: 105 BPM

## 2022-05-09 DIAGNOSIS — G61.81 CIDP (CHRONIC INFLAMMATORY DEMYELINATING POLYNEUROPATHY) (H): Primary | ICD-10-CM

## 2022-05-09 DIAGNOSIS — I26.99 PULMONARY EMBOLISM WITH INFARCTION (H): ICD-10-CM

## 2022-05-09 DIAGNOSIS — R63.5 ABNORMAL WEIGHT GAIN: ICD-10-CM

## 2022-05-09 DIAGNOSIS — R60.0 BILATERAL LEG EDEMA: ICD-10-CM

## 2022-05-09 PROCEDURE — 99214 OFFICE O/P EST MOD 30 MIN: CPT | Performed by: PSYCHIATRY & NEUROLOGY

## 2022-05-09 ASSESSMENT — PAIN SCALES - GENERAL: PAINLEVEL: NO PAIN (0)

## 2022-05-09 NOTE — PROGRESS NOTES
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  In April 2020 she developed confirmed COVID infection. About 4 weeks later her neurologic symptoms began. Her first symptom was tingling in her hands where it then  progressed to a burning in her hands about 4-5 days after the numbness.  It then quickly progressed to involving bilateral legs below the knee and then her feet. She felt weak in her hands and feet. Fine motor tasks and gait became impaired. She has trouble picking up objects because she has to watch herself  objects. She needed a walker. She also felt that her speech became periodically slurred. The sensory symptoms that included pain and allodynia were most problematic in her hands and feet. NCS in 7/20 showed absent sensory responses in the upper and lower limbs and normal motor responses. In 8/20 IVIG 2 gm/kg loading and then 1 gm/kg q 3 week maintenance was started. Through the fall 2020 she made slow improvements, particular in function and gait. By 11/20 she felt \"50%\" better. In 1/21 she reduced IVIG from 1 gm/kg q 3 weeks to q 4 week. In 2021 her neuropathy was stable but she developed thoracic compression fractures, and gait became limited by pain. In the spring and summer 2021 she reported worsening sensation and gait. Outcomes 6/2/21 showed a marked drop off in  strength and I-RODS. In 6/21 we increased IVIG to 1 gm/kg q 3 weeks. She stabilized through the end of 2021.     Interval History:   I last saw her 8/4/21. She continues IVIG 1 gm/kg q 3 weeks. Overall she has done reasonably well. No relapses or deteriorations. She still feels unsteady when walking, but she presents today with no gait support devices. Sometimes she uses a cane or walker at home, but not always. Numbness in her hands and feet persist, which is probably stable. Paresthesias persist in this area as well. " Functionally she is about the same. She struggles with things like dressing and doing day to day tasks that require fine motor skills. No definite IVIG treatment related fluctuations.     Prior pertinent laboratory work-up:  5/2020:  ANH 1:160. Vitamin B1 low (45). B12 low/normal (285). Negative/normal double stranded DNA, ANCA, C-reactive, RF, GM1, GD1, Gq1b, vitamin A, B6, Vit E, SSA, SSB undetectable.  6/2020 CSF: 0 WBC, 0 RBC, protein 58 glucose 29.  7/2020: Normal Vitamin B1, B12, folate    8/2020: TS-HDS mildly elevated at 74373 (N<80232). Negative FGFR3, Immunofixation, paraneoplastic panel,GD1a.  12/20: B1 low (66)  1/2021:  B1 and B6 slightly elevated (B1 = 193, B6 = 182). Normal B12  3/21: Hba1c 5.0  6/21: Normal B6, SSa, SSb, serum IF (no monoclonal protein)    Prior electrophysiologic work-up:  7/23/20 NCS/EMG showed all absent upper and lower limb SNAPS and all normal upper and lower limb motor responses.   8/3/20 NCS/EMG showed absent right  median, ulnar, and sural sensory studies with radial having attenuated amplitude.   1/13/21: NCS still showed a non length-dependent sensory neuropathy or ganglionopathy, but compared to prior studies performed 8/3/20 and 7/23/20 there has been unequivocal interval improvement in sensory response amplitudes in the lower > upper limbs.      Prior pertinent imaging work-up:  5/20: MRI brain with and without contrast was essentially normal  5/20: MRI C spine with and without contrast showed no abnormal enhancement in the spinal cord, thecal sac or cervical vertebrae. No spinal canal or neural foraminal narrowing.  4/21: MRI T and LS spine showed acute compression fractures of T6, T10 and T9 vertebrae, which are new compared to 11/5/2020.     Past Medical History:   Past Medical History:   Diagnosis Date     Acute kidney injury (H) 05/13/2019     Acute massive pulmonary embolism (H) 05/13/2019     Acute pancreatitis 08/18/2018     Acute pancreatitis 02/26/2021    due  to ETOH     Acute thoracic back pain 2021     ARAMIS (acute kidney injury) (H) 2019     Cardiac arrest, cause unspecified (H) 2019    massive pulmonary embolism with pulseless electrical activity cardiac arrest in May 2019 following gastric bypass in 2019      Depression with anxiety      GERD (gastroesophageal reflux disease)      History of alcohol use disorder      HTN (hypertension)      Infection due to 2019 novel coronavirus 2020    COVID19 infection in 2020     Ischemic colitis (H) 2019     Morbid obesity (H)      Scalp laceration, initial encounter 2020     Past Surgical History:  Past Surgical History:   Procedure Laterality Date      SECTION       LAPAROSCOPIC GASTRIC SLEEVE N/A 2019    Procedure: Laparoscopic Sleeve Gastrectomy;  Surgeon: Luan Lopez MD;  Location: [facility] OR     ORTHOPEDIC SURGERY      2 knee meniscus surgery     Family history:    There is no known family history of hereditary neuropathies or other neuromuscular disorders.     Social History:    Social History     Tobacco Use     Smoking status: Current Every Day Smoker     Packs/day: 0.25     Years: 1.00     Pack years: 0.25     Types: Cigarettes     Start date: 2016     Smokeless tobacco: Never Used   Vaping Use     Vaping Use: Never used   Substance Use Topics     Alcohol use: Not Currently     Comment: Quit drinking when last hospitalized     Drug use: Not Currently     Types: Marijuana      Medical Allergies:   No Known Allergies     Current Medications:    Current Outpatient Medications:      alcohol swab prep pads, Use to swab area of injection/luke as directed., Disp: 100 each, Rfl: 11     ammonium lactate (AMLACTIN) 12 % external cream, Apply topically 2 times daily, Disp: 385 g, Rfl: 4     Biotin 5000 MCG TABS, Take 1 tablet by mouth daily, Disp: , Rfl:      blood glucose (NO BRAND SPECIFIED) test strip, Use to test blood sugar 1 times daily or as directed. To  accompany: Blood Glucose Monitor Brands: per insurance., Disp: 100 strip, Rfl: 11     blood glucose calibration (NO BRAND SPECIFIED) solution, To accompany: Blood Glucose Monitor Brands: per insurance., Disp: 100 each, Rfl: 11     blood glucose monitoring (NO BRAND SPECIFIED) meter device kit, Use to test blood sugar 1 times daily or as directed. Preferred blood glucose meter OR supplies to accompany: Blood Glucose Monitor Brands: per insurance., Disp: 1 kit, Rfl: 0     calcium Citrate-vitamin D 500-400 MG-UNIT CHEW, Take 1 chew tab by mouth 3 times daily, Disp: 90 tablet, Rfl: 11     diphenhydrAMINE (BENADRYL) 50 MG capsule, Take 50 mg by mouth every 6 hours as needed for allergies or other (prior to infusion), Disp: , Rfl:      EPINEPHrine (ANY BX GENERIC EQUIV) 0.3 MG/0.3ML injection 2-pack, , Disp: , Rfl:      hydrOXYzine (ATARAX) 25 MG tablet, Take 1 tablet (25 mg) by mouth every 6 hours as needed for anxiety, Disp: 120 tablet, Rfl: 2     lisinopril (ZESTRIL) 10 MG tablet, TAKE 1 TABLET(10 MG) BY MOUTH DAILY, Disp: 90 tablet, Rfl: 2     medroxyPROGESTERone (DEPO-PROVERA) 150 MG/ML IM injection, Inject 150 mg into the muscle every 3 months, Disp: , Rfl:      metFORMIN (GLUCOPHAGE-XR) 500 MG 24 hr tablet, Take 1 tablet (500 mg) by mouth 2 times daily (with meals), Disp: 120 tablet, Rfl: 0     methocarbamol (ROBAXIN) 500 MG tablet, Take 1-2 tablets (500-1,000 mg) by mouth 3 times daily as needed for muscle spasms, Disp: 75 tablet, Rfl: 1     Multiple Vitamins-Minerals (MULTIVITAMIN ADULT) CHEW, Take 1 chew tab by mouth 2 times daily, Disp: 60 tablet, Rfl: 11     OLANZapine (ZYPREXA) 10 MG tablet, Take 1 tablet (10 mg) by mouth At Bedtime, Disp: 30 tablet, Rfl: 3     omeprazole (PRILOSEC) 20 MG DR capsule, Take 1 capsule (20 mg) by mouth 2 times daily, Disp: 180 capsule, Rfl: 3     ondansetron (ZOFRAN-ODT) 4 MG ODT tab, DISSOLVE 1 TABLET(4 MG) ON THE TONGUE EVERY 8 HOURS AS NEEDED FOR NAUSEA, Disp: 30 tablet, Rfl:  1     polyethylene glycol (MIRALAX) 17 GM/SCOOP powder, Take 17 g (1 capful) by mouth daily, Disp: 850 g, Rfl: 3     potassium chloride ER (K-TAB/KLOR-CON) 10 MEQ CR tablet, Take 2 tablets (20 mEq) by mouth 2 times daily, Disp: 120 tablet, Rfl: 1     prazosin (MINIPRESS) 1 MG capsule, TAKE 2 CAPSULES(2 MG) BY MOUTH AT BEDTIME, Disp: 60 capsule, Rfl: 2     Pregabalin (LYRICA) 200 MG capsule, Take 1 capsule (200 mg) by mouth 3 times daily, Disp: 270 capsule, Rfl: 0     PRIVIGEN 20 GM/200ML SOLN, , Disp: , Rfl:      PRIVIGEN 40 GM/400ML SOLN, , Disp: , Rfl:      rivaroxaban ANTICOAGULANT (XARELTO ANTICOAGULANT) 20 MG TABS tablet, Take 1 tablet (20 mg) by mouth daily (with dinner), Disp: 90 tablet, Rfl: 0     SODIUM CHLORIDE FLUSH 0.9 % flush, , Disp: , Rfl:      SOLU-CORTEF 100 MG injection, , Disp: , Rfl:      thin (NO BRAND SPECIFIED) lancets, Use with lanceting device. To accompany: Blood Glucose Monitor Brands: per insurance., Disp: 100 each, Rfl: 11     venlafaxine (EFFEXOR-XR) 75 MG 24 hr capsule, Take 3 capsules (225 mg) by mouth daily, Disp: 90 capsule, Rfl: 3     vitamin B-Complex, Take 1 tablet by mouth daily, Disp: 90 tablet, Rfl: 3     vitamin C (ASCORBIC ACID) 1000 MG TABS, Take 1 tablet (1,000 mg) by mouth daily, Disp: 90 tablet, Rfl: 3     vitamin D3 (CHOLECALCIFEROL) 50 mcg (2000 units) tablet, Take 1 tablet (50 mcg) by mouth daily, Disp: 90 tablet, Rfl: 3    Current Facility-Administered Medications:      cyanocobalamin injection 1,000 mcg, 1,000 mcg, Intramuscular, Q30 Days, Crissy Madrigal PA-C, 1,000 mcg at 03/16/22 1017     cyanocobalamin injection 1,000 mcg, 1,000 mcg, Intramuscular, Q30 Days, Crissy Madrigal PA-C, 1,000 mcg at 02/15/22 1155     medroxyPROGESTERone (DEPO-PROVERA) injection 150 mg, 150 mg, Intramuscular, Q90 Days, Crissy Madrigal PA-C, 150 mg at 02/15/22 3175    Review of Systems: A complete review of systems was obtained and was negative except for what was noted  above.     Physical examination:    BP (!) 134/96   Pulse 105   Resp 17   SpO2 95%     General Appearance: NAD    Skin: There are no rashes or other skin lesions.    Neurologic examination:    Mental status:  Patient is alert, attentive, and oriented x 3.  Language is coherent and fluent without aphasia.  Memory, comprehension and ability to follow commands were intact.       Cranial nerves:   Pupils were round and reacted to light.  Extraocular movements were full. There was no face, jaw, palate or tongue weakness or atrophy. Hearing was grossly intact.       Motor exam: No atrophy or fasciculations.   Manual muscle testing revealed the following MRC grade muscle power:   Right Left   Neck flexion 5    Neck extension: 5    Shoulder abduction:  5 5   Elbow extension: 5 5   Elbow flexion:  5 5   Wrist flexion:  5 5   Wrist extension:  5 5   APB 5 5   FDI 4 4   Hip flexion 5 5   Knee flexion 5 5   Knee extension 5 5   Dorsiflexion 5 5   Plantar flexion 5 5     Complex motor skills: Mild bilateral postural hand tremor. Mild ataxia with FNF.    Sensory exam: Vibration reduced in toes > ankles and fingers. Pin reduced below the knee and below the elbows    Gait: Slightly antalgic but narrow and stable today. Turns easily in 2-3 steps.     Deep tendon reflexes:   Right Left   Triceps 2 2   Biceps 2 2   Brachioradialis 2 2   Knee jerk 0 0   Ankle jerk 0 0     Neuropathy Assessments  Neurology Assessments 2022 2021 2021 3/31/2021 2021 2020 2020   RODS CIDP/MGUSP Score 31 34 22 33 32 29 16   Time for 'Up and Go' test- Seconds: 9.1 8.9 - 25.7 9.79 10.2 15.9      Strength: 2022 2021 2021 3/31/2021 2021 2020 2020   Right hand strength in K 25 16 24 25 18 20   Left hand strength in K 30 14 28 28 17 22     Immunotherapy 2022 2021 2021 3/31/2021 2021 2020 2020   Time since last IVIG (days): 2.5 weeks 1 day 1 week 27 days 1 week 12 5  days   Current treatment: IVIG 1 gm/kg q 3 weeks IVIG 1 gm/kg q 3 weeks IVIG 1 gm/kg q 4 weeks IVIG 1gm/kg q 4 weeks IVIG 1 gm/kg q 3 weeks IVIG 1gm/kg k2ulphe IVIG 1 gm/kg q 3 weeks     Assessment:    Hilaria Bonner is a 31 year old woman with an acute onset (5/2020) 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  She also had well documented nutritional deficiencies (B1) at the time of neuropathy onset which may have contributed as well. Her clinical course has also been complicated by back pain and compression fractures, which are fortunately improved today. From a neuropathy perspective she appears to be stable. I am very pleased with the appearance of her gait and TUG score today. It has been about 1 years since our last IVIG optimization trial. Will try again as below. If she remains IG dependent with this dose optimization trial then we may explore IVIG sparing medications (immunosuppression vs SCIG).      Plan:      1. IVIG: Reduce IVIG from 1 g/kg q 3 weeks to 1 gm/kg q 4 weeks.   2. Pain: Continue follow up in the multidisciplinary pain clinic. Appreciate collateral care.   3. Nutritional: Continue B1 and B12 supplementation.   4. Gait and back pain: Continue PT exercises at home. She also is attending pool therapy at [facility].   5. Labs: None today  6. Follow up in 3 months. Sooner if needed. If she remains stable will continue IVIG weaning at next visit. If she worsens then will discuss continued IVIG vs an IVIG-sparing medication or SCIG.  ---

## 2022-05-09 NOTE — LETTER
"5/9/2022       RE: Hilaria Bonner  [address]  [address]  [facility] [address]     Dear Colleague,    Thank you for referring your patient, Hilaria Bonner, to the [facility] [place] at [facility]. Please see a copy of my visit note below.    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  In April 2020 she developed confirmed COVID infection. About 4 weeks later her neurologic symptoms began. Her first symptom was tingling in her hands where it then  progressed to a burning in her hands about 4-5 days after the numbness.  It then quickly progressed to involving bilateral legs below the knee and then her feet. She felt weak in her hands and feet. Fine motor tasks and gait became impaired. She has trouble picking up objects because she has to watch herself  objects. She needed a walker. She also felt that her speech became periodically slurred. The sensory symptoms that included pain and allodynia were most problematic in her hands and feet. NCS in 7/20 showed absent sensory responses in the upper and lower limbs and normal motor responses. In 8/20 IVIG 2 gm/kg loading and then 1 gm/kg q 3 week maintenance was started. Through the fall 2020 she made slow improvements, particular in function and gait. By 11/20 she felt \"50%\" better. In 1/21 she reduced IVIG from 1 gm/kg q 3 weeks to q 4 week. In 2021 her neuropathy was stable but she developed thoracic compression fractures, and gait became limited by pain. In the spring and summer 2021 she reported worsening sensation and gait. Outcomes 6/2/21 showed a marked drop off in  strength and I-RODS. In 6/21 we increased IVIG to 1 gm/kg q 3 weeks. She stabilized through the end of 2021.     Interval History:   I last saw her 8/4/21. She continues IVIG 1 gm/kg q 3 weeks. " Overall she has done reasonably well. No relapses or deteriorations. She still feels unsteady when walking, but she presents today with no gait support devices. Sometimes she uses a cane or walker at home, but not always. Numbness in her hands and feet persist, which is probably stable. Paresthesias persist in this area as well. Functionally she is about the same. She struggles with things like dressing and doing day to day tasks that require fine motor skills. No definite IVIG treatment related fluctuations.     Prior pertinent laboratory work-up:  5/2020:  ANH 1:160. Vitamin B1 low (45). B12 low/normal (285). Negative/normal double stranded DNA, ANCA, C-reactive, RF, GM1, GD1, Gq1b, vitamin A, B6, Vit E, SSA, SSB undetectable.  6/2020 CSF: 0 WBC, 0 RBC, protein 58 glucose 29.  7/2020: Normal Vitamin B1, B12, folate    8/2020: TS-HDS mildly elevated at 11153 ([id number]). Negative FGFR3, Immunofixation, paraneoplastic panel,GD1a.  12/20: B1 low (66)  1/2021:  B1 and B6 slightly elevated (B1 = 193, B6 = 182). Normal B12  3/21: Hba1c 5.0  6/21: Normal B6, SSa, SSb, serum IF (no monoclonal protein)    Prior electrophysiologic work-up:  7/23/20 NCS/EMG showed all absent upper and lower limb SNAPS and all normal upper and lower limb motor responses.   8/3/20 NCS/EMG showed absent right  median, ulnar, and sural sensory studies with radial having attenuated amplitude.   1/13/21: NCS still showed a non length-dependent sensory neuropathy or ganglionopathy, but compared to prior studies performed 8/3/20 and 7/23/20 there has been unequivocal interval improvement in sensory response amplitudes in the lower > upper limbs.      Prior pertinent imaging work-up:  5/20: MRI brain with and without contrast was essentially normal  5/20: MRI C spine with and without contrast showed no abnormal enhancement in the spinal cord, thecal sac or cervical vertebrae. No spinal canal or neural foraminal narrowing.  4/21: MRI T and LS spine  showed acute compression fractures of T6, T10 and T9 vertebrae, which are new compared to 2020.     Past Medical History:   Past Medical History:   Diagnosis Date     Acute kidney injury (H) 2019     Acute massive pulmonary embolism (H) 2019     Acute pancreatitis 2018     Acute pancreatitis 2021    due to ETOH     Acute thoracic back pain 2021     ARAMIS (acute kidney injury) (H) 2019     Cardiac arrest, cause unspecified (H) 2019    massive pulmonary embolism with pulseless electrical activity cardiac arrest in May 2019 following gastric bypass in 2019      Depression with anxiety      GERD (gastroesophageal reflux disease)      History of alcohol use disorder      HTN (hypertension)      Infection due to 2019 novel coronavirus 2020    COVID19 infection in 2020     Ischemic colitis (H) 2019     Morbid obesity (H)      Scalp laceration, initial encounter 2020     Past Surgical History:  Past Surgical History:   Procedure Laterality Date      SECTION       LAPAROSCOPIC GASTRIC SLEEVE N/A 2019    Procedure: Laparoscopic Sleeve Gastrectomy;  Surgeon: Luan Lopez MD;  Location: [facility] OR     ORTHOPEDIC SURGERY      2 knee meniscus surgery     Family history:    There is no known family history of hereditary neuropathies or other neuromuscular disorders.     Social History:    Social History     Tobacco Use     Smoking status: Current Every Day Smoker     Packs/day: 0.25     Years: 1.00     Pack years: 0.25     Types: Cigarettes     Start date: 2016     Smokeless tobacco: Never Used   Vaping Use     Vaping Use: Never used   Substance Use Topics     Alcohol use: Not Currently     Comment: Quit drinking when last hospitalized     Drug use: Not Currently     Types: Marijuana      Medical Allergies:   No Known Allergies     Current Medications:    Current Outpatient Medications:      alcohol swab prep pads, Use to swab area of  injection/luke as directed., Disp: 100 each, Rfl: 11     ammonium lactate (AMLACTIN) 12 % external cream, Apply topically 2 times daily, Disp: 385 g, Rfl: 4     Biotin 5000 MCG TABS, Take 1 tablet by mouth daily, Disp: , Rfl:      blood glucose (NO BRAND SPECIFIED) test strip, Use to test blood sugar 1 times daily or as directed. To accompany: Blood Glucose Monitor Brands: per insurance., Disp: 100 strip, Rfl: 11     blood glucose calibration (NO BRAND SPECIFIED) solution, To accompany: Blood Glucose Monitor Brands: per insurance., Disp: 100 each, Rfl: 11     blood glucose monitoring (NO BRAND SPECIFIED) meter device kit, Use to test blood sugar 1 times daily or as directed. Preferred blood glucose meter OR supplies to accompany: Blood Glucose Monitor Brands: per insurance., Disp: 1 kit, Rfl: 0     calcium Citrate-vitamin D 500-400 MG-UNIT CHEW, Take 1 chew tab by mouth 3 times daily, Disp: 90 tablet, Rfl: 11     diphenhydrAMINE (BENADRYL) 50 MG capsule, Take 50 mg by mouth every 6 hours as needed for allergies or other (prior to infusion), Disp: , Rfl:      EPINEPHrine (ANY BX GENERIC EQUIV) 0.3 MG/0.3ML injection 2-pack, , Disp: , Rfl:      hydrOXYzine (ATARAX) 25 MG tablet, Take 1 tablet (25 mg) by mouth every 6 hours as needed for anxiety, Disp: 120 tablet, Rfl: 2     lisinopril (ZESTRIL) 10 MG tablet, TAKE 1 TABLET(10 MG) BY MOUTH DAILY, Disp: 90 tablet, Rfl: 2     medroxyPROGESTERone (DEPO-PROVERA) 150 MG/ML IM injection, Inject 150 mg into the muscle every 3 months, Disp: , Rfl:      metFORMIN (GLUCOPHAGE-XR) 500 MG 24 hr tablet, Take 1 tablet (500 mg) by mouth 2 times daily (with meals), Disp: 120 tablet, Rfl: 0     methocarbamol (ROBAXIN) 500 MG tablet, Take 1-2 tablets (500-1,000 mg) by mouth 3 times daily as needed for muscle spasms, Disp: 75 tablet, Rfl: 1     Multiple Vitamins-Minerals (MULTIVITAMIN ADULT) CHEW, Take 1 chew tab by mouth 2 times daily, Disp: 60 tablet, Rfl: 11     OLANZapine (ZYPREXA)  10 MG tablet, Take 1 tablet (10 mg) by mouth At Bedtime, Disp: 30 tablet, Rfl: 3     omeprazole (PRILOSEC) 20 MG DR capsule, Take 1 capsule (20 mg) by mouth 2 times daily, Disp: 180 capsule, Rfl: 3     ondansetron (ZOFRAN-ODT) 4 MG ODT tab, DISSOLVE 1 TABLET(4 MG) ON THE TONGUE EVERY 8 HOURS AS NEEDED FOR NAUSEA, Disp: 30 tablet, Rfl: 1     polyethylene glycol (MIRALAX) 17 GM/SCOOP powder, Take 17 g (1 capful) by mouth daily, Disp: 850 g, Rfl: 3     potassium chloride ER (K-TAB/KLOR-CON) 10 MEQ CR tablet, Take 2 tablets (20 mEq) by mouth 2 times daily, Disp: 120 tablet, Rfl: 1     prazosin (MINIPRESS) 1 MG capsule, TAKE 2 CAPSULES(2 MG) BY MOUTH AT BEDTIME, Disp: 60 capsule, Rfl: 2     Pregabalin (LYRICA) 200 MG capsule, Take 1 capsule (200 mg) by mouth 3 times daily, Disp: 270 capsule, Rfl: 0     PRIVIGEN 20 GM/200ML SOLN, , Disp: , Rfl:      PRIVIGEN 40 GM/400ML SOLN, , Disp: , Rfl:      rivaroxaban ANTICOAGULANT (XARELTO ANTICOAGULANT) 20 MG TABS tablet, Take 1 tablet (20 mg) by mouth daily (with dinner), Disp: 90 tablet, Rfl: 0     SODIUM CHLORIDE FLUSH 0.9 % flush, , Disp: , Rfl:      SOLU-CORTEF 100 MG injection, , Disp: , Rfl:      thin (NO BRAND SPECIFIED) lancets, Use with lanceting device. To accompany: Blood Glucose Monitor Brands: per insurance., Disp: 100 each, Rfl: 11     venlafaxine (EFFEXOR-XR) 75 MG 24 hr capsule, Take 3 capsules (225 mg) by mouth daily, Disp: 90 capsule, Rfl: 3     vitamin B-Complex, Take 1 tablet by mouth daily, Disp: 90 tablet, Rfl: 3     vitamin C (ASCORBIC ACID) 1000 MG TABS, Take 1 tablet (1,000 mg) by mouth daily, Disp: 90 tablet, Rfl: 3     vitamin D3 (CHOLECALCIFEROL) 50 mcg (2000 units) tablet, Take 1 tablet (50 mcg) by mouth daily, Disp: 90 tablet, Rfl: 3    Current Facility-Administered Medications:      cyanocobalamin injection 1,000 mcg, 1,000 mcg, Intramuscular, Q30 Days, Crissy Madrigal PA-C, 1,000 mcg at 03/16/22 1017     cyanocobalamin injection 1,000 mcg,  1,000 mcg, Intramuscular, Q30 Days, Crissy Madrigal PA-C, 1,000 mcg at 02/15/22 1155     medroxyPROGESTERone (DEPO-PROVERA) injection 150 mg, 150 mg, Intramuscular, Q90 Days, Crissy Madrigal PA-C, 150 mg at 02/15/22 1156    Review of Systems: A complete review of systems was obtained and was negative except for what was noted above.     Physical examination:    BP (!) 134/96   Pulse 105   Resp 17   SpO2 95%     General Appearance: NAD    Skin: There are no rashes or other skin lesions.    Neurologic examination:    Mental status:  Patient is alert, attentive, and oriented x 3.  Language is coherent and fluent without aphasia.  Memory, comprehension and ability to follow commands were intact.       Cranial nerves:   Pupils were round and reacted to light.  Extraocular movements were full. There was no face, jaw, palate or tongue weakness or atrophy. Hearing was grossly intact.       Motor exam: No atrophy or fasciculations.   Manual muscle testing revealed the following MRC grade muscle power:   Right Left   Neck flexion 5    Neck extension: 5    Shoulder abduction:  5 5   Elbow extension: 5 5   Elbow flexion:  5 5   Wrist flexion:  5 5   Wrist extension:  5 5   APB 5 5   FDI 4 4   Hip flexion 5 5   Knee flexion 5 5   Knee extension 5 5   Dorsiflexion 5 5   Plantar flexion 5 5     Complex motor skills: Mild bilateral postural hand tremor. Mild ataxia with FNF.    Sensory exam: Vibration reduced in toes > ankles and fingers. Pin reduced below the knee and below the elbows    Gait: Slightly antalgic but narrow and stable today. Turns easily in 2-3 steps.     Deep tendon reflexes:   Right Left   Triceps 2 2   Biceps 2 2   Brachioradialis 2 2   Knee jerk 0 0   Ankle jerk 0 0     Neuropathy Assessments  Neurology Assessments 5/9/2022 8/4/2021 6/2/2021 3/31/2021 1/13/2021 11/18/2020 9/9/2020   RODS CIDP/MGUSP Score 31 34 22 33 32 29 16   Time for 'Up and Go' test- Seconds: 9.1 8.9 - 25.7 9.79 10.2 15.9       Strength: 2022 2021 2021 3/31/2021 2021 2020 2020   Right hand strength in K 25 16 24 25 18 20   Left hand strength in K 30 14 28 28 17 22     Immunotherapy 2022 2021 2021 3/31/2021 2021 2020 2020   Time since last IVIG (days): 2.5 weeks 1 day 1 week 27 days 1 week 12 5 days   Current treatment: IVIG 1 gm/kg q 3 weeks IVIG 1 gm/kg q 3 weeks IVIG 1 gm/kg q 4 weeks IVIG 1gm/kg q 4 weeks IVIG 1 gm/kg q 3 weeks IVIG 1gm/kg y6mftox IVIG 1 gm/kg q 3 weeks     Assessment:    Hilaria Bonner is a 31 year old woman with an acute onset (2020) 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  She also had well documented nutritional deficiencies (B1) at the time of neuropathy onset which may have contributed as well. Her clinical course has also been complicated by back pain and compression fractures, which are fortunately improved today. From a neuropathy perspective she appears to be stable. I am very pleased with the appearance of her gait and TUG score today. It has been about 1 years since our last IVIG optimization trial. Will try again as below. If she remains IG dependent with this dose optimization trial then we may explore IVIG sparing medications (immunosuppression vs SCIG).      Plan:      1. IVIG: Reduce IVIG from 1 g/kg q 3 weeks to 1 gm/kg q 4 weeks.   2. Pain: Continue follow up in the multidisciplinary pain clinic. Appreciate collateral care.   3. Nutritional: Continue B1 and B12 supplementation.   4. Gait and back pain: Continue PT exercises at home. She also is attending pool therapy at [facility].   5. Labs: None today  6. Follow up in 3 months. Sooner if needed. If she remains stable will continue IVIG weaning at next visit. If she worsens then will discuss continued IVIG vs an IVIG-sparing medication or  SCIG.  ---      Sincerely,    Travon Chua MD

## 2022-05-11 ENCOUNTER — DOCUMENTATION ONLY (OUTPATIENT)
Dept: PHARMACY | Facility: CLINIC | Age: 32
End: 2022-05-11
Payer: COMMERCIAL

## 2022-05-11 ENCOUNTER — HOME INFUSION (PRE-WILLOW HOME INFUSION) (OUTPATIENT)
Dept: PHARMACY | Facility: CLINIC | Age: 32
End: 2022-05-11

## 2022-05-11 RX ORDER — RIVAROXABAN 20 MG/1
TABLET, FILM COATED ORAL
Qty: 90 TABLET | Refills: 0 | Status: SHIPPED | OUTPATIENT
Start: 2022-05-11 | End: 2022-08-11

## 2022-05-11 NOTE — TELEPHONE ENCOUNTER
Routing refill request to provider for review/approval because:  Labs out of range:  Creatinine Clearance

## 2022-05-11 NOTE — TELEPHONE ENCOUNTER
Please contact pharmacy and find out if patient has been filling hydrochlorothiazide 12.5 mg daily regularly and when last filled.   Assist with scheduling face to face visit- needs a 40 minute slot- same day ok - missed appointment appointment with me last week

## 2022-05-11 NOTE — PROGRESS NOTES
Skilled Nurse visit in the Patient Home to administer Privigen 60g.  No recent elevated temperature, fever, chills, productive cough, coughing for 3 weeks or longer or hemoptysis, abnormal vital signs, night sweats, chest pain. No  decrease in your appetite, unexplained weight loss or fatigue.  No other new onset medical symptoms.  Current weight 287 lbs.  Peripheral IVright Lower Forearm, 2 attempts Pre medicated with Acetaminophen 650 mg by mouth, 30 minutes prior to infusion. Diphenhydramine 50 mg by mouth, 30 minutes prior to infusion.   Hydrocortisone 100 mg IV push over 2-5 minutes, 30 minutes prior to infusion.    . Infusion completed without complication or reaction. Pt reports therapy iseffective in managing symptoms related to therapy.    Love Ribera RN, BSN  [facility]  [phone]  [email or web address]

## 2022-05-12 ENCOUNTER — HOME INFUSION (PRE-WILLOW HOME INFUSION) (OUTPATIENT)
Dept: PHARMACY | Facility: CLINIC | Age: 32
End: 2022-05-12
Payer: COMMERCIAL

## 2022-05-12 RX ORDER — HYDROCHLOROTHIAZIDE 12.5 MG/1
TABLET ORAL
Qty: 30 TABLET | Refills: 1 | OUTPATIENT
Start: 2022-05-12

## 2022-05-12 NOTE — TELEPHONE ENCOUNTER
Contacted pharmacy.     This Pt last picked up her Rx on 4/14/22. However, it was a refill from her script that was written on 9/11/2021.  Written by Kylah Moseley.     Called patient - she reports she forgot about her appointment, but said she is able to come in this week if possible. She states she has some concerns with her eye as well.     Pt states she is no longer taking hydrochlorothiazide, unsure why that was requested.     Pt scheduled in same day slot tomorrow at 2:40pm.     Tasha Nolan RN, BSN  [facility]

## 2022-05-12 NOTE — TELEPHONE ENCOUNTER
Attempted to contact pharmacy - they do not open until 9:00am, will call back once they open.     Tasha Nolan RN, BSN  [facility]

## 2022-05-13 ENCOUNTER — OFFICE VISIT (OUTPATIENT)
Dept: FAMILY MEDICINE | Facility: CLINIC | Age: 32
End: 2022-05-13
Payer: COMMERCIAL

## 2022-05-13 VITALS
HEART RATE: 92 BPM | SYSTOLIC BLOOD PRESSURE: 130 MMHG | DIASTOLIC BLOOD PRESSURE: 83 MMHG | BODY MASS INDEX: 44.64 KG/M2 | RESPIRATION RATE: 16 BRPM | WEIGHT: 285 LBS | OXYGEN SATURATION: 97 % | TEMPERATURE: 97.1 F

## 2022-05-13 DIAGNOSIS — H01.00B BLEPHARITIS OF UPPER AND LOWER EYELIDS OF BOTH EYES, UNSPECIFIED TYPE: Primary | ICD-10-CM

## 2022-05-13 DIAGNOSIS — F43.21 GRIEF REACTION: ICD-10-CM

## 2022-05-13 DIAGNOSIS — F32.1 MODERATE MAJOR DEPRESSION (H): ICD-10-CM

## 2022-05-13 DIAGNOSIS — F41.1 GAD (GENERALIZED ANXIETY DISORDER): ICD-10-CM

## 2022-05-13 DIAGNOSIS — H57.13 EYE PAIN, BILATERAL: ICD-10-CM

## 2022-05-13 DIAGNOSIS — H01.00A BLEPHARITIS OF UPPER AND LOWER EYELIDS OF BOTH EYES, UNSPECIFIED TYPE: Primary | ICD-10-CM

## 2022-05-13 PROCEDURE — 99214 OFFICE O/P EST MOD 30 MIN: CPT | Performed by: PHYSICIAN ASSISTANT

## 2022-05-13 RX ORDER — ERYTHROMYCIN 5 MG/G
0.5 OINTMENT OPHTHALMIC 2 TIMES DAILY
Qty: 3.5 G | Refills: 1 | Status: SHIPPED | OUTPATIENT
Start: 2022-05-13 | End: 2023-04-20

## 2022-05-13 RX ORDER — HYDROXYZINE HYDROCHLORIDE 25 MG/1
25 TABLET, FILM COATED ORAL EVERY 6 HOURS PRN
Qty: 180 TABLET | Refills: 1 | Status: SHIPPED | OUTPATIENT
Start: 2022-05-13 | End: 2022-09-19

## 2022-05-13 ASSESSMENT — PATIENT HEALTH QUESTIONNAIRE - PHQ9
SUM OF ALL RESPONSES TO PHQ QUESTIONS 1-9: 7
10. IF YOU CHECKED OFF ANY PROBLEMS, HOW DIFFICULT HAVE THESE PROBLEMS MADE IT FOR YOU TO DO YOUR WORK, TAKE CARE OF THINGS AT HOME, OR GET ALONG WITH OTHER PEOPLE: EXTREMELY DIFFICULT
SUM OF ALL RESPONSES TO PHQ QUESTIONS 1-9: 7

## 2022-05-13 ASSESSMENT — ANXIETY QUESTIONNAIRES
2. NOT BEING ABLE TO STOP OR CONTROL WORRYING: SEVERAL DAYS
GAD7 TOTAL SCORE: 6
7. FEELING AFRAID AS IF SOMETHING AWFUL MIGHT HAPPEN: SEVERAL DAYS
5. BEING SO RESTLESS THAT IT IS HARD TO SIT STILL: NOT AT ALL
8. IF YOU CHECKED OFF ANY PROBLEMS, HOW DIFFICULT HAVE THESE MADE IT FOR YOU TO DO YOUR WORK, TAKE CARE OF THINGS AT HOME, OR GET ALONG WITH OTHER PEOPLE?: EXTREMELY DIFFICULT
4. TROUBLE RELAXING: SEVERAL DAYS
GAD7 TOTAL SCORE: 6
3. WORRYING TOO MUCH ABOUT DIFFERENT THINGS: SEVERAL DAYS
6. BECOMING EASILY ANNOYED OR IRRITABLE: SEVERAL DAYS
GAD7 TOTAL SCORE: 6
1. FEELING NERVOUS, ANXIOUS, OR ON EDGE: SEVERAL DAYS
7. FEELING AFRAID AS IF SOMETHING AWFUL MIGHT HAPPEN: SEVERAL DAYS

## 2022-05-13 ASSESSMENT — PAIN SCALES - GENERAL: PAINLEVEL: EXTREME PAIN (8)

## 2022-05-13 NOTE — PROGRESS NOTES
"  Assessment & Plan     Blepharitis of upper and lower eyelids of both eyes, unspecified type  Related to eyelash extensions- treat with erythromycin ointment - follow up with optometry if not improving   - Adult Eye Referral  - erythromycin (ROMYCIN) 5 MG/GM ophthalmic ointment  Dispense: 3.5 g; Refill: 1    Eye pain, bilateral  As above     MELODY (generalized anxiety disorder)  Anxiety and depression As needed hydroxyzine.  Symptoms not controlled.  Reports psychiatry wanted her to see psychology prior to any medication adjustments   - hydrOXYzine (ATARAX) 25 MG tablet  Dispense: 180 tablet; Refill: 1  - Adult Mental Health  Referral    Grief reaction    - Adult Mental Health  Referral    Moderate major depression (H)  As above   - Adult Mental Health  Referral      Prescription drug management         BMI:   Estimated body mass index is 44.64 kg/m  as calculated from the following:    Height as of 3/10/22: 1.702 m (5' 7\").    Weight as of this encounter: 129.3 kg (285 lb).   Weight management plan: Discussed healthy diet and exercise guidelines    Patient Instructions   Use erythromycin eye ointment twice a day to both eyes for the next 7 days  Follow up with eye doctor if not improving by Monday-   Return urgently if any change in symptoms.    Schedule physical and pap smear as soon as possible   Follow up with psychologist         Return in about 4 weeks (around 6/10/2022), or if symptoms worsen or fail to improve, for Routine preventive, in person.    Crissy Madrigal PA-C  [facility] is a 31 year old who presents for the following health issues     History of Present Illness       Back Pain:  She presents for follow up of back pain. Patient's back pain is a recurring problem.  Location of back pain:  Right lower back and left lower back  Description of back pain: stabbing  Back pain spreads: nowhere    Since patient first noticed " "back pain, pain is: always present, but gets better and worse  Does back pain interfere with her job:  Not applicable      Mental Health Follow-up:  Patient presents to follow-up on Depression & Anxiety.Patient's depression since last visit has been:  No change  The patient is not having other symptoms associated with depression.  Patient's anxiety since last visit has been:  No change  The patient is not having other symptoms associated with anxiety.  Any significant life events: grief or loss  Patient is feeling anxious or having panic attacks.  Patient has no concerns about alcohol or drug use.       Today's PHQ-9         PHQ-9 Total Score: 7  PHQ-9 Q9 Thoughts of better off dead/self-harm past 2 weeks :   (P) Not at all    How difficult have these problems made it for you to do your work, take care of things at home, or get along with other people: Extremely difficult    Today's MELODY-7 Score: 6    Diabetes:   She presents for follow up of diabetes.  She is checking home blood glucose one time daily. She checks blood glucose before meals, after meals and before and after meals.  Blood glucose is never over 200 and never under 70. When her blood glucose is low, the patient is asymptomatic for confusion, blurred vision, lethargy and reports not feeling dizzy, shaky, or weak.  She has no concerns regarding her diabetes at this time.  She is having numbness in feet, burning in feet and excessive thirst.         Hypertension: She presents for follow up of hypertension.  She does check blood pressure  regularly outside of the clinic. Outside blood pressures have been over 140/90. She follows a low salt diet.     Migraines:   Since the patient's last clinic visit, headaches are: no change  The patient is getting headaches:  Often  She is not able to do normal daily activities when she has a migraine.  The patient is taking the following rescue/relief medications:  Tylenol   Patient states \"I get some relief\" from the " rescue/relief medications.   The patient is taking the following medications to prevent migraines:  No medications to prevent migraines  In the past 4 weeks, the patient has gone to an Urgent Care or Emergency Room 0 times times due to headaches.    She eats 2-3 servings of fruits and vegetables daily.She consumes 2 sweetened beverage(s) daily.She exercises with enough effort to increase her heart rate 10 to 19 minutes per day.  She exercises with enough effort to increase her heart rate 3 or less days per week.   She is taking medications regularly.       Patient well known to me with chronic inflammatory demyelinating polyneuropathy followed by neurology with infusions, history of PE with cardiac arrest on anticoagulants presents for follow up with me   Declines pap smear today due to eye pain  Eye lash extensions and bothering me and took them off and burning and a lot of pressure  Eye pain both eyes since Wednesday (2 days)- doesn't wear contacts  signifcant anxiety, depression and grief reaction taking Hydroxyzine at least four a day - followed by psychiatry   Hands and feet chronic pain     Review of Systems   Constitutional, HEENT, cardiovascular, pulmonary, gi and gu systems are negative, except as otherwise noted.      Objective    /83   Pulse 92   Temp 97.1  F (36.2  C) (Temporal)   Resp 16   Wt 129.3 kg (285 lb)   SpO2 97%   BMI 44.64 kg/m    Body mass index is 44.64 kg/m .  Physical Exam   GENERAL: alert, no distress and obese  EYES: PERRL, EOMI, eyelids- bilateral upper and lower eyelids with crusting and conjunctiva/corneas- conjunctival injection OU  NECK: no adenopathy, no asymmetry, masses, or scars and thyroid normal to palpation  RESP: lungs clear to auscultation - no rales, rhonchi or wheezes  CV: regular rate and rhythm, normal S1 S2, no S3 or S4, no murmur, click or rub, no peripheral edema and peripheral pulses strong

## 2022-05-14 ASSESSMENT — PATIENT HEALTH QUESTIONNAIRE - PHQ9: SUM OF ALL RESPONSES TO PHQ QUESTIONS 1-9: 7

## 2022-05-14 ASSESSMENT — ANXIETY QUESTIONNAIRES: GAD7 TOTAL SCORE: 6

## 2022-05-23 ENCOUNTER — TELEPHONE (OUTPATIENT)
Dept: SLEEP MEDICINE | Facility: CLINIC | Age: 32
End: 2022-05-23
Payer: COMMERCIAL

## 2022-05-23 ENCOUNTER — PATIENT OUTREACH (OUTPATIENT)
Dept: CARE COORDINATION | Facility: CLINIC | Age: 32
End: 2022-05-23
Payer: COMMERCIAL

## 2022-05-23 DIAGNOSIS — G47.33 OSA (OBSTRUCTIVE SLEEP APNEA): Primary | ICD-10-CM

## 2022-05-23 NOTE — PROGRESS NOTES
[facility]/Voicemail       Clinical Data: Care Coordinator Outreach  Outreach attempted x 2.  Left message on patient's voicemail with call back information and requested return call.  Plan: Care Coordinator will send unable to contact letter with care coordinator contact information via [employer]. Care Coordinator will try to reach patient again in 10 business days.  Next outreach due: 6/6/22

## 2022-05-23 NOTE — LETTER
[facility] CARE COORDINATION  [address]  [place] [address]    May 23, 2022    Hilaria Bonner  [address]  [address]  [place] [address]      Dear Hilaria,    I have been attempting to reach you since our last contact. I would like to continue to work with you and provide any additional support you may need on achieving your health care related goals. I would appreciate if you would give me a call at [phone] to let me know if you would like to continue working together. I know that there are many things that can affect our ability to communicate and I hope we can continue to work together.    All of us at the [facility] are invested in your health and are here to assist you in meeting your goals.     Sincerely,    Lea Mendoza

## 2022-05-24 ENCOUNTER — PATIENT OUTREACH (OUTPATIENT)
Dept: CARE COORDINATION | Facility: CLINIC | Age: 32
End: 2022-05-24
Payer: COMMERCIAL

## 2022-05-24 NOTE — PROGRESS NOTES
Clinic Care Coordination Contact    Situation: Patient chart reviewed by care coordinator.    Background: Patient is enrolled in Care Coordination. CHW and [facility] are following.     Assessment: CHW attempted to reach patient x2 for follow up. No answer. CHW left VM.    Plan/Recommendations: CHW plans to try patient for third attempt on or around 6/6. CHW did send unable to contact letter to patient for mychart.     CHW will:  CHW Delegation:   1)  Due Date:  6/6/2022       Delegation: If patient does not answer at next outreach, please notify [facility] to consider for disenrollment.          Albino Pratt, IVET  Primary Care Clinic- Social Work Care Coordinator  [facility] and [facility]  Ph: [phone]  5/24/2022 10:20 AM

## 2022-06-06 ENCOUNTER — HOME INFUSION (PRE-WILLOW HOME INFUSION) (OUTPATIENT)
Dept: PHARMACY | Facility: CLINIC | Age: 32
End: 2022-06-06
Payer: COMMERCIAL

## 2022-06-07 DIAGNOSIS — I10 BENIGN ESSENTIAL HYPERTENSION: ICD-10-CM

## 2022-06-07 RX ORDER — POTASSIUM CHLORIDE 750 MG/1
TABLET, EXTENDED RELEASE ORAL
Qty: 120 TABLET | Refills: 1 | Status: ON HOLD | OUTPATIENT
Start: 2022-06-07 | End: 2022-08-06

## 2022-06-08 ENCOUNTER — TELEPHONE (OUTPATIENT)
Dept: SLEEP MEDICINE | Facility: CLINIC | Age: 32
End: 2022-06-08
Payer: COMMERCIAL

## 2022-06-08 ENCOUNTER — HOME INFUSION (PRE-WILLOW HOME INFUSION) (OUTPATIENT)
Dept: PHARMACY | Facility: CLINIC | Age: 32
End: 2022-06-08
Payer: COMMERCIAL

## 2022-06-08 DIAGNOSIS — G47.33 OSA (OBSTRUCTIVE SLEEP APNEA): Primary | ICD-10-CM

## 2022-06-08 NOTE — TELEPHONE ENCOUNTER
Patient called my direct line back and left voicemail. I called her back, she is now rescheduled for 6/21/2022 @2:30pm in [facility] showroom for new CPAP setup. Confirmed address, date, and time of appointment; I also sent her message on [employer] with appointment details.

## 2022-06-08 NOTE — PROGRESS NOTES
This is a recent snapshot of the patient's [facility] medical record.  For current drug dose and complete information and questions, call [phone] or In Basket pool, fv home infusion ([id number])  CSN Number:  [id number]

## 2022-06-08 NOTE — TELEPHONE ENCOUNTER
Patient was scheduled for new CPAP setup appointment with me today at [facility] showroom at 10:30am. Patient no called/no showed appointment. I called patient and left voicemail for her to give me a call back at my direct line to reschedule.

## 2022-06-09 ENCOUNTER — PATIENT OUTREACH (OUTPATIENT)
Dept: CARE COORDINATION | Facility: CLINIC | Age: 32
End: 2022-06-09
Payer: COMMERCIAL

## 2022-06-09 DIAGNOSIS — F41.1 GAD (GENERALIZED ANXIETY DISORDER): ICD-10-CM

## 2022-06-09 NOTE — PROGRESS NOTES
Clinic Care Coordination Contact  [facility]/Voicemail       Clinical Data: Care Coordinator Outreach  Outreach attempted x 3.  Left message on patient's voicemail with call back information and requested return call.    Plan:  Care Coordinator will do no further outreaches at this time.      Stephenie Martin  Community Health Worker  [facility] Care Coordination  [email or web address]  Office: [phone]

## 2022-06-09 NOTE — TELEPHONE ENCOUNTER
Date of Last Office Visit: 2//2/22  Date of Next Office Visit: 9/7/22  No shows since last visit: None  Cancellations since last visit: none    Medication requested:   venlafaxine (EFFEXOR-XR) 75 MG 24 hr capsule 90 capsule 3 2/2/2022  No   Sig - Route: Take 3 capsules (225 mg) by mouth daily - Oral   Sent to pharmacy as: Venlafaxine HCl ER 75 MG Oral Capsule Extended Release 24 Hour (EFFEXOR-XR)   Class: E-Prescribe   Order: [id number]   E-Prescribing Status: Receipt confirmed by pharmacy (2/2/2022  2:08 PM CST)          Review of [place] ?: na    Lapse in medication adherence greater than 5 days?: no  If yes, call patient and gather details: no  Medication refill request verified as identical to current order?: yes  Result of Last DAM, VPA, Li+ Level, CBC, or Carbamazepine Level (at or since last visit): N/A    Last visit treatment plan: Instructions       Return in about 3 months (around 5/2/2022).  The patient continues to follow-up with her medical team regarding her diabetes management.  Apparently she has had some variable compliance with her metformin.  I have elected to increase her Effexor XR to 225 mg a day.  Risks and benefits were discussed.  I will make no change in the olanzapine at this time.  She should return to this clinic for a follow-up in 3 months.  I have ordered her to start with an individual therapist and she is agreed to do that.            []Medication refilled per  Medication Refill in Ambulatory Care  policy.  [x]Medication unable to be refilled by RN due to criteria not met as indicated below:    []Eligibility - not seen in the last year   []Supervision - no future appointment   []Compliance - no shows, cancellations or lapse in therapy   []Verification - order discrepancy   []Controlled medication   [x]Medication not included in policy   []90-day supply request   []Other

## 2022-06-10 RX ORDER — VENLAFAXINE HYDROCHLORIDE 75 MG/1
CAPSULE, EXTENDED RELEASE ORAL
Qty: 90 CAPSULE | Refills: 3 | Status: SHIPPED | OUTPATIENT
Start: 2022-06-10 | End: 2023-01-24

## 2022-06-15 NOTE — PROGRESS NOTES
This is a recent snapshot of the patient's [facility] medical record.  For current drug dose and complete information and questions, call [phone] or In Basket pool, fv home infusion ([id number])  CSN Number:  [id number]

## 2022-06-16 NOTE — PROGRESS NOTES
This is a recent snapshot of the patient's [facility] medical record.  For current drug dose and complete information and questions, call [phone] or In Basket pool, fv home infusion ([id number])  CSN Number:  [id number]

## 2022-06-21 DIAGNOSIS — G47.33 OBSTRUCTIVE SLEEP APNEA (ADULT) (PEDIATRIC): ICD-10-CM

## 2022-06-21 DIAGNOSIS — G47.33 OSA (OBSTRUCTIVE SLEEP APNEA): Primary | ICD-10-CM

## 2022-06-22 NOTE — PROGRESS NOTES
This is a recent snapshot of the patient's [facility] medical record.  For current drug dose and complete information and questions, call [phone] or In Basket pool, fv home infusion ([id number])  CSN Number:  [id number]

## 2022-06-24 ENCOUNTER — DOCUMENTATION ONLY (OUTPATIENT)
Dept: SLEEP MEDICINE | Facility: CLINIC | Age: 32
End: 2022-06-24

## 2022-06-24 DIAGNOSIS — G47.33 OSA (OBSTRUCTIVE SLEEP APNEA): Primary | ICD-10-CM

## 2022-06-24 NOTE — PROGRESS NOTES
3 day Sleep therapy management telephone visit    Diagnostic AHI: 6.7  PSG    Confirmed with patient at time of call- N/A Patient is still interested in STM service       Message left for patient to return call        Objective data     Order Settings for PAP  CPAP min 6    CPAP max 15             Device settings from machine CPAP min 6.0     CPAP max 15.0           EPR Setting TWO    RESMED soft response  OFF     Assessment: Nighty usage most nights over four hours      Action plan: Patient to have 14 day STM visit. Patient has a follow up visit scheduled:   yes within 31-90 days of set up    Replacement device: No  STM ordered by provider: Yes     Total time spent on accessing and  interpreting remote patient PAP therapy data  10 minutes    Total time spent counseling, coaching  and reviewing PAP therapy data with patient  1 minutes    [id number] no

## 2022-06-24 NOTE — PROGRESS NOTES
This is a recent snapshot of the patient's [facility] medical record.  For current drug dose and complete information and questions, call [phone] or In Basket pool, fv home infusion ([id number])  CSN Number:  [id number]

## 2022-06-28 NOTE — PROGRESS NOTES
This is a recent snapshot of the patient's [facility] medical record.  For current drug dose and complete information and questions, call [phone] or In Basket pool, fv home infusion ([id number])  CSN Number:  [id number]

## 2022-06-30 ENCOUNTER — TRANSFERRED RECORDS (OUTPATIENT)
Dept: FAMILY MEDICINE | Facility: CLINIC | Age: 32
End: 2022-06-30

## 2022-06-30 ENCOUNTER — TELEPHONE (OUTPATIENT)
Dept: FAMILY MEDICINE | Facility: CLINIC | Age: 32
End: 2022-06-30

## 2022-06-30 NOTE — TELEPHONE ENCOUNTER
"Fell yesterday, didn't hit head     Went to bathroom and started getting hot, stood up quickly and then fell more on her right side and injured both knees and right foot.    Both knees are having intermittent pain, \"almost feels like a muscle spasm\"    Right foot is swollen and cannot bare weight, is using a walker. Says she can barely walk.    Did look at [facility] and no available appt tomorrow and pt didn't want to wait for Tuesday so advised she go to [facility] to address quickly.    [place] [facility]    Thank you,  Bonnie Rangel, RN  [place]        "

## 2022-07-01 ENCOUNTER — HOME INFUSION (PRE-WILLOW HOME INFUSION) (OUTPATIENT)
Dept: PHARMACY | Facility: CLINIC | Age: 32
End: 2022-07-01

## 2022-07-05 ENCOUNTER — HOME INFUSION (PRE-WILLOW HOME INFUSION) (OUTPATIENT)
Dept: PHARMACY | Facility: CLINIC | Age: 32
End: 2022-07-05

## 2022-07-06 ENCOUNTER — HOME INFUSION (PRE-WILLOW HOME INFUSION) (OUTPATIENT)
Dept: PHARMACY | Facility: CLINIC | Age: 32
End: 2022-07-06

## 2022-07-06 ENCOUNTER — DOCUMENTATION ONLY (OUTPATIENT)
Dept: PHARMACY | Facility: CLINIC | Age: 32
End: 2022-07-06

## 2022-07-06 DIAGNOSIS — E11.65 TYPE 2 DIABETES MELLITUS WITH HYPERGLYCEMIA, WITHOUT LONG-TERM CURRENT USE OF INSULIN (H): ICD-10-CM

## 2022-07-06 RX ORDER — METFORMIN HCL 500 MG
TABLET, EXTENDED RELEASE 24 HR ORAL
Qty: 120 TABLET | Refills: 0 | Status: SHIPPED | OUTPATIENT
Start: 2022-07-06 | End: 2023-01-24

## 2022-07-06 NOTE — PROGRESS NOTES
This is a recent snapshot of the patient's [facility] medical record.  For current drug dose and complete information and questions, call [phone] or In Basket pool, fv home infusion ([id number])  CSN Number:  [id number]

## 2022-07-07 ENCOUNTER — PATIENT OUTREACH (OUTPATIENT)
Dept: CARE COORDINATION | Facility: CLINIC | Age: 32
End: 2022-07-07

## 2022-07-07 ENCOUNTER — DOCUMENTATION ONLY (OUTPATIENT)
Dept: SLEEP MEDICINE | Facility: CLINIC | Age: 32
End: 2022-07-07

## 2022-07-07 DIAGNOSIS — G47.33 OSA (OBSTRUCTIVE SLEEP APNEA): Primary | ICD-10-CM

## 2022-07-07 NOTE — PROGRESS NOTES
14  DAY STM VISIT    Diagnostic AHI: 6.7  PSG    Subjective measures:   Patient reports doing well with CPAP but at times feeling like she is suffocating with air.     Assessment: Pt meeting objective benchmarks. AHI, leak and compliance  Patient failing following subjective benchmarks: pressure issues    Action plan: pt to have 30 day STM visit.      Device type: Auto-CPAP    PAP settings: CPAP min 6.0 cm  H20       CPAP max 15.0 cm  H20      95th% pressure 8.3 cm  H20        RESMED EPR level Setting: TWO    RESMED Soft response setting:  OFF    Mask type:  Nasal Mask    Objective measures: 14 day rolling measures      Compliance  57 %      Leak  3.36  lpm  last  upload      AHI 0.25   last  upload      Average number of minutes 255      Objective measure goal  Compliance   Goal >70%  Leak   Goal < 24 lpm  AHI  Goal < 5  Usage  Goal >240        Total time spent on accessing and interpreting remote patient PAP therapy data  10 minutes    Total time spent counseling, coaching  and reviewing PAP therapy data with patient 4  minutes    [id number]  [id number]  no (3 day STM)

## 2022-07-07 NOTE — PROGRESS NOTES
Skilled Nurse visit in the Patient Home to administer Privigen 60g.  No recent elevated temperature, fever, chills, productive cough, coughing for 3 weeks or longer or hemoptysis, abnormal vital signs, night sweats, chest pain. No  decrease in your appetite, unexplained weight loss or fatigue.  No other new onset medical symptoms.  Current weight 285 lbs.  Peripheral IVright Hand, 3 attempts Pre medicated with  Acetaminophen 650 mg by mouth, 30 minutes prior to infusion.   - Diphenhydramine 50 mg by mouth, 30 minutes prior to infusion.   - Hydrocortisone 100 mg IV push over 2-5 minutes, 30 minutes prior to infusion.   Infusion completed without complication or reaction. Pt reports therapy iseffective in managing symptoms related to therapy.      Love Ribera RN, BSN  [facility]  [phone]  [email or web address]

## 2022-07-07 NOTE — PROGRESS NOTES
Clinic Care Coordination Contact    Situation: Patient chart reviewed by care coordinator.    Background: Patient is enrolled in Care Coordination. CHW and [facility] are following.     Assessment: CHW attempted to reach patient on 6/9. No answer. This was CHW's third attempt    Plan/Recommendations: [facility] disenrolled patient due to being unable to reach. If further needs arise, please re-consult CC    IVET Bowens  Primary Care Clinic- Social Work Care Coordinator  [facility]  Ph: [phone]  7/7/2022 11:14 AM

## 2022-07-07 NOTE — PROGRESS NOTES
This is a recent snapshot of the patient's [facility] medical record.  For current drug dose and complete information and questions, call [phone] or In Basket pool, fv home infusion ([id number])  CSN Number:  [id number]

## 2022-07-08 ENCOUNTER — TELEPHONE (OUTPATIENT)
Dept: OPTOMETRY | Facility: CLINIC | Age: 32
End: 2022-07-08

## 2022-07-08 NOTE — PROGRESS NOTES
This is a recent snapshot of the patient's [facility] medical record.  For current drug dose and complete information and questions, call [phone] or In Basket pool, fv home infusion ([id number])  CSN Number:  [id number]

## 2022-07-08 NOTE — TELEPHONE ENCOUNTER
Left Voicemail (2nd Attempt) for the patient to call back and schedule the following:    Appointment type: return   Provider: Dr. Camarena    Return date: 1/10/2023  Specialty phone number: [phone]    Additonal Notes: 1 year follow up yearly eye exam     Radha loya Procedure   Orthopedics, Podiatry, Sports Medicine, ENT/Eye Specialties  [facility]   [phone]

## 2022-07-10 ENCOUNTER — HEALTH MAINTENANCE LETTER (OUTPATIENT)
Age: 32
End: 2022-07-10

## 2022-07-10 DIAGNOSIS — E11.65 TYPE 2 DIABETES MELLITUS WITH HYPERGLYCEMIA, WITHOUT LONG-TERM CURRENT USE OF INSULIN (H): ICD-10-CM

## 2022-07-12 RX ORDER — BLOOD-GLUCOSE METER
EACH MISCELLANEOUS
Qty: 1 KIT | Refills: 0 | Status: SHIPPED | OUTPATIENT
Start: 2022-07-12 | End: 2023-01-24

## 2022-07-14 ENCOUNTER — TRANSFERRED RECORDS (OUTPATIENT)
Dept: HEALTH INFORMATION MANAGEMENT | Facility: CLINIC | Age: 32
End: 2022-07-14

## 2022-07-21 ENCOUNTER — VIRTUAL VISIT (OUTPATIENT)
Dept: PSYCHOLOGY | Facility: CLINIC | Age: 32
End: 2022-07-21
Attending: PHYSICIAN ASSISTANT
Payer: COMMERCIAL

## 2022-07-21 DIAGNOSIS — F43.21 GRIEF REACTION: ICD-10-CM

## 2022-07-21 DIAGNOSIS — F33.3 MDD (MAJOR DEPRESSIVE DISORDER), RECURRENT, SEVERE, WITH PSYCHOSIS (H): Primary | ICD-10-CM

## 2022-07-21 PROCEDURE — 90837 PSYTX W PT 60 MINUTES: CPT | Mod: 95 | Performed by: SOCIAL WORKER

## 2022-07-21 ASSESSMENT — ANXIETY QUESTIONNAIRES
3. WORRYING TOO MUCH ABOUT DIFFERENT THINGS: NEARLY EVERY DAY
1. FEELING NERVOUS, ANXIOUS, OR ON EDGE: NEARLY EVERY DAY
2. NOT BEING ABLE TO STOP OR CONTROL WORRYING: NEARLY EVERY DAY
6. BECOMING EASILY ANNOYED OR IRRITABLE: MORE THAN HALF THE DAYS
GAD7 TOTAL SCORE: 18
GAD7 TOTAL SCORE: 18
5. BEING SO RESTLESS THAT IT IS HARD TO SIT STILL: MORE THAN HALF THE DAYS
IF YOU CHECKED OFF ANY PROBLEMS ON THIS QUESTIONNAIRE, HOW DIFFICULT HAVE THESE PROBLEMS MADE IT FOR YOU TO DO YOUR WORK, TAKE CARE OF THINGS AT HOME, OR GET ALONG WITH OTHER PEOPLE: EXTREMELY DIFFICULT
7. FEELING AFRAID AS IF SOMETHING AWFUL MIGHT HAPPEN: MORE THAN HALF THE DAYS

## 2022-07-21 ASSESSMENT — COLUMBIA-SUICIDE SEVERITY RATING SCALE - C-SSRS
6. HAVE YOU EVER DONE ANYTHING, STARTED TO DO ANYTHING, OR PREPARED TO DO ANYTHING TO END YOUR LIFE?: NO
ATTEMPT SINCE LAST CONTACT: NO
2. HAVE YOU ACTUALLY HAD ANY THOUGHTS OF KILLING YOURSELF?: NO
SUICIDE, SINCE LAST CONTACT: NO
TOTAL  NUMBER OF INTERRUPTED ATTEMPTS SINCE LAST CONTACT: NO
1. SINCE LAST CONTACT, HAVE YOU WISHED YOU WERE DEAD OR WISHED YOU COULD GO TO SLEEP AND NOT WAKE UP?: NO
TOTAL  NUMBER OF ABORTED OR SELF INTERRUPTED ATTEMPTS SINCE LAST CONTACT: NO

## 2022-07-21 ASSESSMENT — PATIENT HEALTH QUESTIONNAIRE - PHQ9
SUM OF ALL RESPONSES TO PHQ QUESTIONS 1-9: 22
5. POOR APPETITE OR OVEREATING: NEARLY EVERY DAY

## 2022-07-21 NOTE — PROGRESS NOTES
This is a recent snapshot of the patient's [facility] medical record.  For current drug dose and complete information and questions, call [phone] or In Basket pool, fv home infusion ([id number])  CSN Number:  [id number]

## 2022-07-21 NOTE — PROGRESS NOTES
"      [facility] Counseling   Mental Health & Addiction Services     Progress Note - Initial Visit    Patient  Name:  Hilaria Bonner Date: 2022         Service Type: Individual     Visit Start Time:8:25  Visit End Time: 9:27    Visit #: 1    Attendees: Client    Service Modality:  Phone Visit:      Provider verified identity through the following two step process.  Patient provided:  Patient photo, Patient  and Patient address    The patient has been notified of the following:      \"We have found that certain health care needs can be provided without the need for a face to face visit.  This service lets us provide the care you need with a phone conversation.       I will have full access to your [facility] medical record during this entire phone call.   I will be taking notes for your medical record.      Since this is like an office visit, we will bill your insurance company for this service.       There are potential benefits and risks of telephone visits (e.g. limits to patient confidentiality) that differ from in-person visits.?Confidentiality still applies for telephone services, and nobody will record the visit.  It is important to be in a quiet, private space that is free of distractions (including cell phone or other devices) during the visit.??      If during the course of the call I believe a telephone visit is not appropriate, you will not be charged for this service\"     Consent has been obtained for this service by care team member: Yes     DATA:   Interactive Complexity: Yes, visit entailed Interactive Complexity evidenced by:  -The need to manage maladaptive communication (related to, e.g., high anxiety, high reactivity, repeated questions, or disagreement) among participants that complicates delivery of care. Experiencing auditory hallucinations, visual hallucinations, stomach problems which affected her sleep and the level of her thought processing.      Crisis: " No     Presenting Concerns/  Current Stressors: Patient has been on the schedule for some time now. She presented with diagnoses of MDD and MELODY. She has been grieving the death of her long term partner and the father of her 2 sons(12 and 6). Reports she found him dead in his apt in February this year. Reports she also has been experiencing auditory and visual hallucinations since 2018. She started taking medications in 2020. Reports the voices are not clear but people are having a conversation on a busy street with music too. Reports she sees things too but those things are not clear either and yet they keep showing up.Said she keeps seeing some family members alive even though they have been dead. These 2 symptoms have been present even before her partner's death. She is not working anymore since 2020. Shares this time medications are calming hallucinations. Though she expressed the need to review her mediations and will see her psychiatrist about this. Patient also shared she has been smoking cigarettes to calm down. She is aware nicotine is bad for her health. She is open to some alternatives in the future. Patient has no close friends here in [place]. Her friends are [place] but she manages to talk to to them.She denied and other mood altering substances. Patient notes she some times forgets. Today though she was late to the phone as she was kept by the stomach issue. Not sure what upset her stomach. Spent the who night and early morning in the bathroom. Patient would like to process her grief and learn how to cope.  She will return on 7/25. The next visit will give writer better conclusion as it looks like patient is dealing issues that have been present even prior to her partner's death.     ASSESSMENT:  Mental Status Assessment:  Appearance:   Not seen, not assessed   Eye Contact:   not seen, not assessed   Psychomotor Behavior: not seen, not assessed   Attitude:   Cooperative   Orientation:   Person Place Time  Situation  Speech   Rate / Production: Normal/ Responsive   Volume:  Normal   Mood:    Depressed   Affect:    not seen, not assessed   Thought Content:  Clear   Thought Form:  Coherent  Logical   Insight:    Good     Safety Issues and Plan for Safety and Risk Management:     S[place]ard Suicide Severity Rating Scale (Short Version)  [place]ard Suicide Severity Rating (Short Version) 3/26/2019 6/14/2019 5/29/2020 6/5/2020 10/22/2020 3/22/2021 7/21/2022   Over the past 2 weeks have you felt down, depressed, or hopeless? - no no yes yes yes -   Over the past 2 weeks have you had thoughts of killing yourself? - no no no no no -   Have you ever attempted to kill yourself? - no no no no no -   Q2 Suicidal Thoughts (Past Month) no - - - - - -   Q6 Suicide Behavior (Lifetime) no - - - - - -   1. Wish to be Dead (Since Last Contact) - - - - - - 0   2. Non-Specific Active Suicidal Thoughts (Since Last Contact) - - - - - - 0   Actual Attempt (Since Last Contact) - - - - - - 0   Interrupted Attempts (Since Last Contact) - - - - - - 0   Aborted or Self-Interrupted Attempt (Since Last Contact) - - - - - - 0   Preparatory Acts or Behavior (Since Last Contact) - - - - - - 0   Suicide (Since Last Contact) - - - - - - 0   Calculated C-SSRS Risk Score (Since Last Contact) - - - - - - No Risk Indicated     Patient denies current fears or concerns for personal safety.  Patient denies current or recent suicidal ideation or behaviors.  Patient denies current or recent homicidal ideation or behaviors.  Patient denies current or recent self injurious behavior or ideation.  Patient denies other safety concerns.  Recommended that patient call 911 or go to the local ED should there be a change in any of these risk factors.  Patient reports there are no firearms in the house.     Diagnostic Criteria:  Major Depressive Disorder  CRITERIA (A-C) REPRESENT A MAJOR DEPRESSIVE EPISODE - SELECT THESE CRITERIA   - Depressed mood. Note: In children and  adolescents, can be irritable mood.     - Significant weight gainincrease in appetite.    - Fatigue or loss of energy.    - Feelings of worthlessness or inappropriate guilt.    - Diminished ability to think or concentrate, or indecisiveness.   C) The episode is not attributable to the physiological effects of a substance or to another medical condition  E) There has never been a manic episode or hypomanic episode    DSM5 Diagnoses: (Sustained by DSM5 Criteria Listed Above)  Diagnoses:   296.34 (F33.3) Major Depressive Disorder, Recurrent Episode, With psychotic features _ and With anxious distress      Grief reaction [F43.21]  Psychosocial & Contextual Factors:  Grieving the death of her long term partner and the father of her 2 children. Active auditory and visual hallucinations.   WHODAS 2.0 (12 item):   WHODAS 2.0 Total Score 3/22/2021   Total Score 40     Intervention:  AIDET was performed. Rapport building has started. Patient was assisted scheduling future sessions.   Collateral Reports Completed:  Routed note to PCP    PLAN: (Homework, other):  1. Provider will continue Diagnostic Assessment.  Patient was given the following to do until next session:  Take some walks to alleviate some some depression.  2. Provider recommended the following referrals: No referral discussed today  3.  Suicide Risk and Safety Concerns were assessed for Hilaria Bonner: No safety concern.    JESSICA Reagan  July 21, 2022

## 2022-07-21 NOTE — PROGRESS NOTES
This is a recent snapshot of the patient's [facility] medical record.  For current drug dose and complete information and questions, call [phone] or In Basket pool, fv home infusion ([id number])  CSN Number:  [id number]

## 2022-07-22 ENCOUNTER — DOCUMENTATION ONLY (OUTPATIENT)
Dept: SLEEP MEDICINE | Facility: CLINIC | Age: 32
End: 2022-07-22

## 2022-07-22 DIAGNOSIS — G47.33 OSA (OBSTRUCTIVE SLEEP APNEA): Primary | ICD-10-CM

## 2022-07-22 NOTE — PROGRESS NOTES
30 DAY STM VISIT    Diagnostic AHI: 6.7  PSG    Subjective measures:   Patient states that things are going okay with CPAP. She has found herself waking up not breathing and sometimes feels like she is not getting enough air. She is considering getting a full face mask.    Assessment: Pt not meeting objective benchmarks for compliance  Patient failing following subjective benchmarks: mask discomfort   Action plan: schedule mask fit appointment and 2 week STM recheck appt scheduled  Patient has scheduled a follow up visit with Misael Melendrez PA-C on 9/19/2022.   Device type: Auto-CPAP  PAP settings: CPAP min 6.0 cm  H20     CPAP max 15.0 cm  H20    95th% pressure 10.2 cm  H20      RESMED EPR level Setting: TWO    RESMED Soft response setting:  OFF  Mask type:  Nasal Pillows  Objective measures: 14 day rolling measures      Compliance  35 %      Leak  8.57 lpm  last  upload      AHI 1.24   last  upload      Average number of minutes 228      Objective measure goal  Compliance   Goal >70%  Leak   Goal < 24 lpm  AHI  Goal < 5  Usage  Goal >240        Total time spent on accessing and interpreting remote patient PAP therapy data  10 minutes    Total time spent counseling, coaching  and reviewing PAP therapy data with patient  10 minutes     [id number] this call  [id number] no  at 3 or 14 day [facility]

## 2022-07-25 ENCOUNTER — VIRTUAL VISIT (OUTPATIENT)
Dept: PSYCHOLOGY | Facility: CLINIC | Age: 32
End: 2022-07-25
Payer: COMMERCIAL

## 2022-07-25 ENCOUNTER — DOCUMENTATION ONLY (OUTPATIENT)
Dept: PSYCHOLOGY | Facility: CLINIC | Age: 32
End: 2022-07-25

## 2022-07-25 DIAGNOSIS — F43.21 GRIEF REACTION: Primary | ICD-10-CM

## 2022-07-25 DIAGNOSIS — F33.2 MDD (MAJOR DEPRESSIVE DISORDER), RECURRENT SEVERE, WITHOUT PSYCHOSIS (H): ICD-10-CM

## 2022-07-25 PROCEDURE — 90791 PSYCH DIAGNOSTIC EVALUATION: CPT | Mod: 95 | Performed by: SOCIAL WORKER

## 2022-07-25 ASSESSMENT — ANXIETY QUESTIONNAIRES
GAD7 TOTAL SCORE: 18
3. WORRYING TOO MUCH ABOUT DIFFERENT THINGS: NEARLY EVERY DAY
8. IF YOU CHECKED OFF ANY PROBLEMS, HOW DIFFICULT HAVE THESE MADE IT FOR YOU TO DO YOUR WORK, TAKE CARE OF THINGS AT HOME, OR GET ALONG WITH OTHER PEOPLE?: EXTREMELY DIFFICULT
2. NOT BEING ABLE TO STOP OR CONTROL WORRYING: NEARLY EVERY DAY
6. BECOMING EASILY ANNOYED OR IRRITABLE: SEVERAL DAYS
5. BEING SO RESTLESS THAT IT IS HARD TO SIT STILL: MORE THAN HALF THE DAYS
GAD7 TOTAL SCORE: 18
7. FEELING AFRAID AS IF SOMETHING AWFUL MIGHT HAPPEN: NEARLY EVERY DAY
4. TROUBLE RELAXING: NEARLY EVERY DAY
7. FEELING AFRAID AS IF SOMETHING AWFUL MIGHT HAPPEN: NEARLY EVERY DAY
1. FEELING NERVOUS, ANXIOUS, OR ON EDGE: NEARLY EVERY DAY
IF YOU CHECKED OFF ANY PROBLEMS ON THIS QUESTIONNAIRE, HOW DIFFICULT HAVE THESE PROBLEMS MADE IT FOR YOU TO DO YOUR WORK, TAKE CARE OF THINGS AT HOME, OR GET ALONG WITH OTHER PEOPLE: EXTREMELY DIFFICULT
GAD7 TOTAL SCORE: 18

## 2022-07-25 NOTE — PROGRESS NOTES
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 others.      - Sense of a foreshortened future (e.g., does not expect to have a career, marriage, children, or a normal life span).   D. Persistent symptoms of increased arousal (not present before the trauma), as indicated by two (or more) of the following:     - Difficulty falling or staying asleep.      - Irritability or outbursts of anger.      - Difficulty concentrating.      - Hypervigilance.   E. Duration of the disturbance is more than 1 month.  F. The disturbance causes clinically significant distress or impairment in social, occupational, or other important areas of functioning.    Functional Status:  Patient reports the following functional impairments:  chronic disease management, health maintenance, management of the household and or completion of tasks, social interactions and work / vocational responsibilities.     Programmatic care:  Current  PROMIS 10 was assigned and patient needs the following level of care based on score of 14. Patient is already receiving services. Has a PCA and a nurse.     Clinical Summary:  1. Reason for assessment:  Grief, depression, anxiety .  2. Psychosocial, Cultural and Contextual Factors: ongoing  depression, anxiety, recent loss of long term partner , the father of her 2 children, strong history of child trauma.  3. Principal DSM5 Diagnoses  (Sustained by DSM5 Criteria Listed Above):   296.24 (F32.3)  Major Depressive Disorder, Single Episode, With psychotic features _ and With mood-congruent psychotic features  300.02 (F41.1) Generalized Anxiety Disorder  309.81 (F43.10) Posttraumatic Stress Disorder (includes Posttraumatic Stress Disorder for Children 6 Years and Younger)  With dissociative symptoms.  4. Other Diagnoses that is relevant to services: Grief reaction [F43.21]  5. Provisional Diagnosis: Grief reaction [F43.21];  296.32 (F33.1) Major Depressive Disorder, Recurrent Episode, Moderate _ and With mood-congruent psychotic features  300.02 (F41.1) Generalized Anxiety Disorder  309.81 (F43.10) Posttraumatic Stress Disorder (includes Posttraumatic Stress Disorder for Children 6 Years and Younger)  With dissociative symptoms as evidenced by Clinical inventories, Clinical interview, mental status, chart review .  6. Prognosis: Expect Improvement.  7. Likely consequences of symptoms if not treated: symptoms would get worse and would lead to hospitalization.  8. Client strengths include:  caring, creative, insightful, motivated, open to learning, support of family, friends and providers, wants to learn, willing to ask questions, willing to relate to others and work history .     Recommendations:   1. Plan for Safety and Risk Management:   Safety and Risk: Recommended that patient call 911 or go to the local ED should there be a change in any of these risk factors..  Patient was communicated the number for Suicide prevention: 988  Report to child / adult protection services was NA.     2. Patient's identified mental health concerns with a cultural influence will be addressed by Patient.     3. Initial Treatment will focus on:   Depressed Mood - moderate  Anxiety - moderate  Grief / Loss - ongoing since  "February  Thought Disturbance including: hallucinations and both visual and auditory. ongoing since 2018.     4. Resources/Service Plan:    services are not indicated.   Modifications to assist communication are not indicated.   Additional disability accommodations are not indicated.      5. Collaboration:   Collaboration / coordination of treatment will be initiated with the following  support professionals: primary care physician.    6.  Referrals:   The following referral(s) will be initiated: Case Management.  Patient will complete a RYLIE before sending out the referral. Next Scheduled therapy Appointment: 8/01/2022     A Release of Information has been obtained for the following: [facility]- patient will complete a RYLIE for [facility] for the CM referral.    Emergency Contact :  1.  Alyciadarrelltru Mendez ( Auntie): [phone]  2.  Jazmin Bonner( Mom):[phone]  was obtained.  ROIs will be signed.Patient wants to have either one as her contact.    7. MEET:    MEET: Patient reports no alcohol and drug use. However, per chart review, her medical conditions list include  \"Acute pancreatitis due to the use of ETOH\"    A follow up will be done so the patient can receive the proper care.     8. Records:   These were reviewed at time of assessment.   Information in this assessment was obtained from the medical record and  provided by patient who is a good historian.   Patient will have open access to her mental health medical record.    Provider Name/ Credentials:  RUIZ Reagan, JESSICA, July 25, 2022          Answers for HPI/ROS submitted by the patient on 7/25/2022  MELODY 7 TOTAL SCORE: 18      "

## 2022-07-25 NOTE — PROGRESS NOTES
This is a recent snapshot of the patient's [facility] medical record.  For current drug dose and complete information and questions, call [phone] or In Basket pool, [facility] ([id number])  CSN Number:  [id number]

## 2022-07-26 ENCOUNTER — HOSPITAL ENCOUNTER (EMERGENCY)
Facility: CLINIC | Age: 32
End: 2022-07-26
Payer: COMMERCIAL

## 2022-07-26 ENCOUNTER — APPOINTMENT (OUTPATIENT)
Dept: CT IMAGING | Facility: CLINIC | Age: 32
End: 2022-07-26
Attending: INTERNAL MEDICINE
Payer: COMMERCIAL

## 2022-07-26 ENCOUNTER — OFFICE VISIT (OUTPATIENT)
Dept: FAMILY MEDICINE | Facility: CLINIC | Age: 32
End: 2022-07-26
Payer: COMMERCIAL

## 2022-07-26 ENCOUNTER — HOSPITAL ENCOUNTER (INPATIENT)
Facility: CLINIC | Age: 32
LOS: 16 days | Discharge: HOME | End: 2022-08-11
Attending: INTERNAL MEDICINE | Admitting: STUDENT IN AN ORGANIZED HEALTH CARE EDUCATION/TRAINING PROGRAM
Payer: COMMERCIAL

## 2022-07-26 VITALS
OXYGEN SATURATION: 100 % | DIASTOLIC BLOOD PRESSURE: 80 MMHG | HEIGHT: 67 IN | TEMPERATURE: 97 F | WEIGHT: 269.7 LBS | BODY MASS INDEX: 42.33 KG/M2 | RESPIRATION RATE: 20 BRPM | HEART RATE: 117 BPM | SYSTOLIC BLOOD PRESSURE: 122 MMHG

## 2022-07-26 DIAGNOSIS — E11.65 TYPE 2 DIABETES MELLITUS WITH HYPERGLYCEMIA, WITHOUT LONG-TERM CURRENT USE OF INSULIN (H): ICD-10-CM

## 2022-07-26 DIAGNOSIS — G89.18 POSTOPERATIVE PAIN: ICD-10-CM

## 2022-07-26 DIAGNOSIS — E86.1 HYPOVOLEMIA: ICD-10-CM

## 2022-07-26 DIAGNOSIS — N17.9 ACUTE KIDNEY INJURY (H): Primary | ICD-10-CM

## 2022-07-26 DIAGNOSIS — E66.01 MORBID OBESITY (H): ICD-10-CM

## 2022-07-26 DIAGNOSIS — G62.9 POLYNEUROPATHY: ICD-10-CM

## 2022-07-26 DIAGNOSIS — E53.8 VITAMIN B12 DEFICIENCY (NON ANEMIC): ICD-10-CM

## 2022-07-26 DIAGNOSIS — R55 SYNCOPE, UNSPECIFIED SYNCOPE TYPE: ICD-10-CM

## 2022-07-26 DIAGNOSIS — R74.01 TRANSAMINITIS: ICD-10-CM

## 2022-07-26 DIAGNOSIS — R74.8 ABNORMAL SERUM LEVEL OF ALKALINE PHOSPHATASE: ICD-10-CM

## 2022-07-26 DIAGNOSIS — Z12.4 CERVICAL CANCER SCREENING: ICD-10-CM

## 2022-07-26 DIAGNOSIS — H53.9 VISION CHANGES: ICD-10-CM

## 2022-07-26 DIAGNOSIS — K92.2 GASTROINTESTINAL HEMORRHAGE, UNSPECIFIED GASTROINTESTINAL HEMORRHAGE TYPE: ICD-10-CM

## 2022-07-26 DIAGNOSIS — G47.33 OSA (OBSTRUCTIVE SLEEP APNEA): ICD-10-CM

## 2022-07-26 DIAGNOSIS — Z30.42 ENCOUNTER FOR SURVEILLANCE OF INJECTABLE CONTRACEPTIVE: ICD-10-CM

## 2022-07-26 DIAGNOSIS — E87.20 LACTIC ACIDOSIS: ICD-10-CM

## 2022-07-26 DIAGNOSIS — R79.89 ELEVATED LACTIC ACID LEVEL: ICD-10-CM

## 2022-07-26 DIAGNOSIS — K62.5 RECTAL BLEEDING: ICD-10-CM

## 2022-07-26 DIAGNOSIS — F32.1 MODERATE MAJOR DEPRESSION (H): ICD-10-CM

## 2022-07-26 DIAGNOSIS — Z00.00 ROUTINE GENERAL MEDICAL EXAMINATION AT A HEALTH CARE FACILITY: Primary | ICD-10-CM

## 2022-07-26 DIAGNOSIS — N39.41 URGE INCONTINENCE OF URINE: ICD-10-CM

## 2022-07-26 DIAGNOSIS — I95.9 HYPOTENSION, UNSPECIFIED HYPOTENSION TYPE: ICD-10-CM

## 2022-07-26 DIAGNOSIS — I26.99 PULMONARY EMBOLISM WITH INFARCTION (H): ICD-10-CM

## 2022-07-26 DIAGNOSIS — D62 ANEMIA DUE TO BLOOD LOSS, ACUTE: ICD-10-CM

## 2022-07-26 DIAGNOSIS — L84 CALLUS: ICD-10-CM

## 2022-07-26 DIAGNOSIS — Z98.84 S/P LAPAROSCOPIC SLEEVE GASTRECTOMY: ICD-10-CM

## 2022-07-26 DIAGNOSIS — R79.89 ELEVATED LFTS: ICD-10-CM

## 2022-07-26 DIAGNOSIS — Z86.711 HISTORY OF PULMONARY EMBOLISM: ICD-10-CM

## 2022-07-26 DIAGNOSIS — F43.21 GRIEF REACTION: ICD-10-CM

## 2022-07-26 DIAGNOSIS — N17.9 AKI (ACUTE KIDNEY INJURY) (H): ICD-10-CM

## 2022-07-26 DIAGNOSIS — F41.1 GAD (GENERALIZED ANXIETY DISORDER): ICD-10-CM

## 2022-07-26 DIAGNOSIS — Z79.01 LONG TERM (CURRENT) USE OF ANTICOAGULANTS: ICD-10-CM

## 2022-07-26 DIAGNOSIS — Z86.711 PERSONAL HISTORY OF PE (PULMONARY EMBOLISM): ICD-10-CM

## 2022-07-26 DIAGNOSIS — N30.00 ACUTE CYSTITIS WITHOUT HEMATURIA: ICD-10-CM

## 2022-07-26 DIAGNOSIS — D50.0 IRON DEFICIENCY ANEMIA DUE TO CHRONIC BLOOD LOSS: ICD-10-CM

## 2022-07-26 DIAGNOSIS — Z79.01 CHRONIC ANTICOAGULATION: ICD-10-CM

## 2022-07-26 DIAGNOSIS — Z23 NEED FOR PROPHYLACTIC VACCINATION AND INOCULATION AGAINST VIRAL HEPATITIS: ICD-10-CM

## 2022-07-26 DIAGNOSIS — Z79.899 ENCOUNTER FOR LONG-TERM (CURRENT) USE OF MEDICATIONS: ICD-10-CM

## 2022-07-26 DIAGNOSIS — E86.1 HYPOTENSION DUE TO HYPOVOLEMIA: ICD-10-CM

## 2022-07-26 DIAGNOSIS — Z20.822 CONTACT WITH AND (SUSPECTED) EXPOSURE TO COVID-19: ICD-10-CM

## 2022-07-26 LAB
ABO/RH(D): NORMAL
ALBUMIN SERPL BCG-MCNC: 2.4 G/DL (ref 3.5–5.2)
ALBUMIN SERPL-MCNC: 1.7 G/DL (ref 3.4–5)
ALBUMIN UR-MCNC: 10 MG/DL
ALP SERPL-CCNC: 283 U/L (ref 35–104)
ALP SERPL-CCNC: 283 U/L (ref 40–150)
ALT SERPL W P-5'-P-CCNC: 123 U/L (ref 10–35)
ALT SERPL W P-5'-P-CCNC: 134 U/L (ref 0–50)
AMPHETAMINES UR QL: NOT DETECTED
ANION GAP SERPL CALCULATED.3IONS-SCNC: 16 MMOL/L (ref 3–14)
ANION GAP SERPL CALCULATED.3IONS-SCNC: 19 MMOL/L (ref 7–15)
ANTIBODY SCREEN: NEGATIVE
APPEARANCE UR: CLEAR
AST SERPL W P-5'-P-CCNC: 221 U/L (ref 0–45)
AST SERPL W P-5'-P-CCNC: ABNORMAL U/L
ATRIAL RATE - MUSE: 95 BPM
BARBITURATES UR QL SCN: NOT DETECTED
BASOPHILS # BLD AUTO: 0 10E3/UL (ref 0–0.2)
BASOPHILS # BLD AUTO: 0 10E3/UL (ref 0–0.2)
BASOPHILS NFR BLD AUTO: 0 %
BASOPHILS NFR BLD AUTO: 0 %
BENZODIAZ UR QL SCN: NOT DETECTED
BILIRUB SERPL-MCNC: 2.6 MG/DL
BILIRUB SERPL-MCNC: 2.6 MG/DL (ref 0.2–1.3)
BILIRUB UR QL STRIP: NEGATIVE
BLD PROD TYP BPU: NORMAL
BLD PROD TYP BPU: NORMAL
BLOOD COMPONENT TYPE: NORMAL
BLOOD COMPONENT TYPE: NORMAL
BUN SERPL-MCNC: 19.5 MG/DL (ref 6–20)
BUN SERPL-MCNC: 20 MG/DL (ref 7–30)
BUPRENORPHINE UR QL: NOT DETECTED
CA-I BLD-MCNC: 3.9 MG/DL (ref 4.4–5.2)
CALCIUM SERPL-MCNC: 7.9 MG/DL (ref 8.5–10.1)
CALCIUM SERPL-MCNC: 7.9 MG/DL (ref 8.6–10)
CANNABINOIDS UR QL: NOT DETECTED
CHLORIDE BLD-SCNC: 103 MMOL/L (ref 94–109)
CHLORIDE SERPL-SCNC: 101 MMOL/L (ref 98–107)
CO2 SERPL-SCNC: 18 MMOL/L (ref 20–32)
COCAINE UR QL SCN: NOT DETECTED
CODING SYSTEM: NORMAL
CODING SYSTEM: NORMAL
COLOR UR AUTO: YELLOW
CPB POCT: NO
CREAT SERPL-MCNC: 2.14 MG/DL (ref 0.51–0.95)
CREAT SERPL-MCNC: 2.18 MG/DL (ref 0.52–1.04)
CROSSMATCH: NORMAL
CROSSMATCH: NORMAL
D-METHAMPHET UR QL: NOT DETECTED
DEPRECATED HCO3 PLAS-SCNC: 17 MMOL/L (ref 22–29)
DIASTOLIC BLOOD PRESSURE - MUSE: NORMAL MMHG
EOSINOPHIL # BLD AUTO: 0 10E3/UL (ref 0–0.7)
EOSINOPHIL # BLD AUTO: 0.1 10E3/UL (ref 0–0.7)
EOSINOPHIL NFR BLD AUTO: 0 %
EOSINOPHIL NFR BLD AUTO: 0 %
ERYTHROCYTE [DISTWIDTH] IN BLOOD BY AUTOMATED COUNT: 14.5 % (ref 10–15)
ERYTHROCYTE [DISTWIDTH] IN BLOOD BY AUTOMATED COUNT: 14.7 % (ref 10–15)
GFR SERPL CREATININE-BSD FRML MDRD: 30 ML/MIN/1.73M2
GFR SERPL CREATININE-BSD FRML MDRD: 31 ML/MIN/1.73M2
GLUCOSE BLD-MCNC: 123 MG/DL (ref 70–99)
GLUCOSE BLD-MCNC: 161 MG/DL (ref 70–99)
GLUCOSE BLDC GLUCOMTR-MCNC: 80 MG/DL (ref 70–99)
GLUCOSE SERPL-MCNC: 132 MG/DL (ref 70–99)
GLUCOSE UR STRIP-MCNC: NEGATIVE MG/DL
HBA1C MFR BLD: 5.4 % (ref 0–5.6)
HCG INTACT+B SERPL-ACNC: <1 MIU/ML
HCG UR QL: NEGATIVE
HCO3 BLDV-SCNC: 16 MMOL/L (ref 21–28)
HCO3 BLDV-SCNC: 17 MMOL/L (ref 21–28)
HCO3 BLDV-SCNC: 18 MMOL/L (ref 21–28)
HCT VFR BLD AUTO: 20.5 % (ref 35–47)
HCT VFR BLD AUTO: 20.6 % (ref 35–47)
HCT VFR BLD CALC: 18 % (ref 35–47)
HGB BLD-MCNC: 6.1 G/DL (ref 11.7–15.7)
HGB BLD-MCNC: 6.4 G/DL (ref 11.7–15.7)
HGB BLD-MCNC: 6.5 G/DL (ref 11.7–15.7)
HGB UR QL STRIP: ABNORMAL
HOLD SPECIMEN: NORMAL
HOLD SPECIMEN: NORMAL
IMM GRANULOCYTES # BLD: 0.1 10E3/UL
IMM GRANULOCYTES # BLD: 0.1 10E3/UL
IMM GRANULOCYTES NFR BLD: 0 %
IMM GRANULOCYTES NFR BLD: 1 %
INR PPP: 1.95 (ref 0.85–1.15)
INTERPRETATION ECG - MUSE: NORMAL
ISSUE DATE AND TIME: NORMAL
ISSUE DATE AND TIME: NORMAL
KETONES UR STRIP-MCNC: NEGATIVE MG/DL
LACTATE BLD-SCNC: 6.5 MMOL/L
LACTATE BLD-SCNC: 7.3 MMOL/L
LEUKOCYTE ESTERASE UR QL STRIP: ABNORMAL
LYMPHOCYTES # BLD AUTO: 2.8 10E3/UL (ref 0.8–5.3)
LYMPHOCYTES # BLD AUTO: 2.9 10E3/UL (ref 0.8–5.3)
LYMPHOCYTES NFR BLD AUTO: 23 %
LYMPHOCYTES NFR BLD AUTO: 24 %
MCH RBC QN AUTO: 34.2 PG (ref 26.5–33)
MCH RBC QN AUTO: 35 PG (ref 26.5–33)
MCHC RBC AUTO-ENTMCNC: 31.1 G/DL (ref 31.5–36.5)
MCHC RBC AUTO-ENTMCNC: 31.2 G/DL (ref 31.5–36.5)
MCV RBC AUTO: 110 FL (ref 78–100)
MCV RBC AUTO: 113 FL (ref 78–100)
METHADONE UR QL SCN: NOT DETECTED
MONOCYTES # BLD AUTO: 0.9 10E3/UL (ref 0–1.3)
MONOCYTES # BLD AUTO: 0.9 10E3/UL (ref 0–1.3)
MONOCYTES NFR BLD AUTO: 7 %
MONOCYTES NFR BLD AUTO: 7 %
NEUTROPHILS # BLD AUTO: 8.3 10E3/UL (ref 1.6–8.3)
NEUTROPHILS # BLD AUTO: 8.6 10E3/UL (ref 1.6–8.3)
NEUTROPHILS NFR BLD AUTO: 68 %
NEUTROPHILS NFR BLD AUTO: 69 %
NITRATE UR QL: NEGATIVE
NRBC # BLD AUTO: 0 10E3/UL
NRBC BLD AUTO-RTO: 0 /100
OPIATES UR QL SCN: NOT DETECTED
OXYCODONE UR QL SCN: NOT DETECTED
P AXIS - MUSE: 34 DEGREES
PCO2 BLDV: 30 MM HG (ref 40–50)
PCO2 BLDV: 30 MM HG (ref 40–50)
PCO2 BLDV: 33 MM HG (ref 40–50)
PCP UR QL SCN: NOT DETECTED
PH BLDV: 7.33 [PH] (ref 7.32–7.43)
PH BLDV: 7.33 [PH] (ref 7.32–7.43)
PH BLDV: 7.37 [PH] (ref 7.32–7.43)
PH UR STRIP: 6 [PH] (ref 5–7)
PLATELET # BLD AUTO: 405 10E3/UL (ref 150–450)
PLATELET # BLD AUTO: 431 10E3/UL (ref 150–450)
PO2 BLDV: 16 MM HG (ref 25–47)
PO2 BLDV: 21 MM HG (ref 25–47)
PO2 BLDV: 40 MM HG (ref 25–47)
POTASSIUM BLD-SCNC: 3.8 MMOL/L (ref 3.4–5.3)
POTASSIUM BLD-SCNC: 4.3 MMOL/L (ref 3.4–5.3)
POTASSIUM SERPL-SCNC: 4.2 MMOL/L (ref 3.4–5.3)
PR INTERVAL - MUSE: 152 MS
PROPOXYPH UR QL: NOT DETECTED
PROT SERPL-MCNC: 5.9 G/DL (ref 6.4–8.3)
PROT SERPL-MCNC: 6.3 G/DL (ref 6.8–8.8)
QRS DURATION - MUSE: 66 MS
QT - MUSE: 364 MS
QTC - MUSE: 457 MS
R AXIS - MUSE: 3 DEGREES
RBC # BLD AUTO: 1.83 10E6/UL (ref 3.8–5.2)
RBC # BLD AUTO: 1.87 10E6/UL (ref 3.8–5.2)
RBC URINE: 3 /HPF
RETICS # AUTO: 0.15 10E6/UL (ref 0.03–0.1)
RETICS/RBC NFR AUTO: 6.1 % (ref 0.5–2)
SAO2 % BLDV: 20 % (ref 94–100)
SAO2 % BLDV: 31 % (ref 94–100)
SAO2 % BLDV: 74 % (ref 94–100)
SARS-COV-2 RNA RESP QL NAA+PROBE: NEGATIVE
SODIUM BLD-SCNC: 139 MMOL/L (ref 133–144)
SODIUM SERPL-SCNC: 137 MMOL/L (ref 133–144)
SODIUM SERPL-SCNC: 137 MMOL/L (ref 136–145)
SP GR UR STRIP: 1.04 (ref 1–1.03)
SPECIMEN EXPIRATION DATE: NORMAL
SQUAMOUS EPITHELIAL: 1 /HPF
SYSTOLIC BLOOD PRESSURE - MUSE: NORMAL MMHG
T AXIS - MUSE: 15 DEGREES
TRICYCLICS UR QL SCN: NOT DETECTED
TSH SERPL DL<=0.005 MIU/L-ACNC: 3.9 MU/L (ref 0.4–4)
UNIT ABO/RH: NORMAL
UNIT ABO/RH: NORMAL
UNIT NUMBER: NORMAL
UNIT NUMBER: NORMAL
UNIT STATUS: NORMAL
UNIT STATUS: NORMAL
UNIT TYPE ISBT: 6200
UNIT TYPE ISBT: 6200
UROBILINOGEN UR STRIP-MCNC: NORMAL MG/DL
VENTRICULAR RATE- MUSE: 95 BPM
VIT B12 SERPL-MCNC: >4000 PG/ML (ref 232–1245)
WBC # BLD AUTO: 12.1 10E3/UL (ref 4–11)
WBC # BLD AUTO: 12.4 10E3/UL (ref 4–11)
WBC URINE: 12 /HPF

## 2022-07-26 PROCEDURE — 85060 BLOOD SMEAR INTERPRETATION: CPT | Performed by: PATHOLOGY

## 2022-07-26 PROCEDURE — 250N000013 HC RX MED GY IP 250 OP 250 PS 637

## 2022-07-26 PROCEDURE — 83036 HEMOGLOBIN GLYCOSYLATED A1C: CPT | Performed by: PHYSICIAN ASSISTANT

## 2022-07-26 PROCEDURE — 99395 PREV VISIT EST AGE 18-39: CPT | Mod: 25 | Performed by: PHYSICIAN ASSISTANT

## 2022-07-26 PROCEDURE — 84155 ASSAY OF PROTEIN SERUM: CPT | Performed by: INTERNAL MEDICINE

## 2022-07-26 PROCEDURE — G0145 SCR C/V CYTO,THINLAYER,RESCR: HCPCS | Performed by: PHYSICIAN ASSISTANT

## 2022-07-26 PROCEDURE — 93005 ELECTROCARDIOGRAM TRACING: CPT | Performed by: INTERNAL MEDICINE

## 2022-07-26 PROCEDURE — 86923 COMPATIBILITY TEST ELECTRIC: CPT | Performed by: INTERNAL MEDICINE

## 2022-07-26 PROCEDURE — 80050 GENERAL HEALTH PANEL: CPT | Performed by: PHYSICIAN ASSISTANT

## 2022-07-26 PROCEDURE — 87086 URINE CULTURE/COLONY COUNT: CPT | Performed by: INTERNAL MEDICINE

## 2022-07-26 PROCEDURE — 90746 HEPB VACCINE 3 DOSE ADULT IM: CPT | Performed by: PHYSICIAN ASSISTANT

## 2022-07-26 PROCEDURE — 36430 TRANSFUSION BLD/BLD COMPNT: CPT | Performed by: INTERNAL MEDICINE

## 2022-07-26 PROCEDURE — 83010 ASSAY OF HAPTOGLOBIN QUANT: CPT | Performed by: STUDENT IN AN ORGANIZED HEALTH CARE EDUCATION/TRAINING PROGRAM

## 2022-07-26 PROCEDURE — 96372 THER/PROPH/DIAG INJ SC/IM: CPT | Performed by: PHYSICIAN ASSISTANT

## 2022-07-26 PROCEDURE — 90471 IMMUNIZATION ADMIN: CPT | Performed by: PHYSICIAN ASSISTANT

## 2022-07-26 PROCEDURE — 96365 THER/PROPH/DIAG IV INF INIT: CPT | Mod: 59 | Performed by: INTERNAL MEDICINE

## 2022-07-26 PROCEDURE — 99291 CRITICAL CARE FIRST HOUR: CPT | Mod: 25 | Performed by: INTERNAL MEDICINE

## 2022-07-26 PROCEDURE — 82803 BLOOD GASES ANY COMBINATION: CPT

## 2022-07-26 PROCEDURE — 85007 BL SMEAR W/DIFF WBC COUNT: CPT | Performed by: STUDENT IN AN ORGANIZED HEALTH CARE EDUCATION/TRAINING PROGRAM

## 2022-07-26 PROCEDURE — 82947 ASSAY GLUCOSE BLOOD QUANT: CPT | Performed by: INTERNAL MEDICINE

## 2022-07-26 PROCEDURE — 81001 URINALYSIS AUTO W/SCOPE: CPT | Performed by: INTERNAL MEDICINE

## 2022-07-26 PROCEDURE — C9113 INJ PANTOPRAZOLE SODIUM, VIA: HCPCS | Performed by: INTERNAL MEDICINE

## 2022-07-26 PROCEDURE — 99285 EMERGENCY DEPT VISIT HI MDM: CPT | Mod: 25 | Performed by: INTERNAL MEDICINE

## 2022-07-26 PROCEDURE — U0005 INFEC AGEN DETEC AMPLI PROBE: HCPCS | Performed by: INTERNAL MEDICINE

## 2022-07-26 PROCEDURE — 82330 ASSAY OF CALCIUM: CPT

## 2022-07-26 PROCEDURE — 99207 PR INPT ADMISSION FROM CLINIC: CPT | Performed by: PHYSICIAN ASSISTANT

## 2022-07-26 PROCEDURE — 96366 THER/PROPH/DIAG IV INF ADDON: CPT | Performed by: INTERNAL MEDICINE

## 2022-07-26 PROCEDURE — 258N000003 HC RX IP 258 OP 636: Performed by: INTERNAL MEDICINE

## 2022-07-26 PROCEDURE — 250N000009 HC RX 250: Performed by: INTERNAL MEDICINE

## 2022-07-26 PROCEDURE — 85610 PROTHROMBIN TIME: CPT | Performed by: INTERNAL MEDICINE

## 2022-07-26 PROCEDURE — 74174 CTA ABD&PLVS W/CONTRAST: CPT

## 2022-07-26 PROCEDURE — 90472 IMMUNIZATION ADMIN EACH ADD: CPT | Performed by: PHYSICIAN ASSISTANT

## 2022-07-26 PROCEDURE — 250N000011 HC RX IP 250 OP 636: Performed by: INTERNAL MEDICINE

## 2022-07-26 PROCEDURE — 84702 CHORIONIC GONADOTROPIN TEST: CPT | Performed by: INTERNAL MEDICINE

## 2022-07-26 PROCEDURE — 36415 COLL VENOUS BLD VENIPUNCTURE: CPT | Performed by: STUDENT IN AN ORGANIZED HEALTH CARE EDUCATION/TRAINING PROGRAM

## 2022-07-26 PROCEDURE — 90677 PCV20 VACCINE IM: CPT | Performed by: PHYSICIAN ASSISTANT

## 2022-07-26 PROCEDURE — 36415 COLL VENOUS BLD VENIPUNCTURE: CPT | Performed by: PHYSICIAN ASSISTANT

## 2022-07-26 PROCEDURE — 36415 COLL VENOUS BLD VENIPUNCTURE: CPT | Performed by: INTERNAL MEDICINE

## 2022-07-26 PROCEDURE — 96375 TX/PRO/DX INJ NEW DRUG ADDON: CPT | Performed by: INTERNAL MEDICINE

## 2022-07-26 PROCEDURE — 85045 AUTOMATED RETICULOCYTE COUNT: CPT | Performed by: STUDENT IN AN ORGANIZED HEALTH CARE EDUCATION/TRAINING PROGRAM

## 2022-07-26 PROCEDURE — 93010 ELECTROCARDIOGRAM REPORT: CPT | Mod: 59 | Performed by: INTERNAL MEDICINE

## 2022-07-26 PROCEDURE — 80306 DRUG TEST PRSMV INSTRMNT: CPT | Performed by: PHYSICIAN ASSISTANT

## 2022-07-26 PROCEDURE — 86850 RBC ANTIBODY SCREEN: CPT | Performed by: INTERNAL MEDICINE

## 2022-07-26 PROCEDURE — 93000 ELECTROCARDIOGRAM COMPLETE: CPT | Performed by: PHYSICIAN ASSISTANT

## 2022-07-26 PROCEDURE — 200N000002 HC R&B ICU UMMC

## 2022-07-26 PROCEDURE — 85025 COMPLETE CBC W/AUTO DIFF WBC: CPT | Performed by: INTERNAL MEDICINE

## 2022-07-26 PROCEDURE — 85027 COMPLETE CBC AUTOMATED: CPT | Performed by: STUDENT IN AN ORGANIZED HEALTH CARE EDUCATION/TRAINING PROGRAM

## 2022-07-26 PROCEDURE — 81025 URINE PREGNANCY TEST: CPT | Performed by: PHYSICIAN ASSISTANT

## 2022-07-26 PROCEDURE — 82607 VITAMIN B-12: CPT | Performed by: PHYSICIAN ASSISTANT

## 2022-07-26 PROCEDURE — 87624 HPV HI-RISK TYP POOLED RSLT: CPT | Performed by: PHYSICIAN ASSISTANT

## 2022-07-26 PROCEDURE — C9803 HOPD COVID-19 SPEC COLLECT: HCPCS | Performed by: INTERNAL MEDICINE

## 2022-07-26 PROCEDURE — 74174 CTA ABD&PLVS W/CONTRAST: CPT | Mod: 26 | Performed by: RADIOLOGY

## 2022-07-26 PROCEDURE — P9016 RBC LEUKOCYTES REDUCED: HCPCS | Performed by: INTERNAL MEDICINE

## 2022-07-26 PROCEDURE — 258N000003 HC RX IP 258 OP 636

## 2022-07-26 PROCEDURE — 99223 1ST HOSP IP/OBS HIGH 75: CPT | Mod: GC | Performed by: STUDENT IN AN ORGANIZED HEALTH CARE EDUCATION/TRAINING PROGRAM

## 2022-07-26 RX ORDER — DEXTROSE MONOHYDRATE 25 G/50ML
25-50 INJECTION, SOLUTION INTRAVENOUS
Status: DISCONTINUED | OUTPATIENT
Start: 2022-07-26 | End: 2022-08-11 | Stop reason: HOSPADM

## 2022-07-26 RX ORDER — IOPAMIDOL 755 MG/ML
100 INJECTION, SOLUTION INTRAVASCULAR ONCE
Status: COMPLETED | OUTPATIENT
Start: 2022-07-26 | End: 2022-07-26

## 2022-07-26 RX ORDER — HYDROCHLOROTHIAZIDE 12.5 MG/1
TABLET ORAL
Status: ON HOLD | COMMUNITY
Start: 2022-04-12 | End: 2022-08-11

## 2022-07-26 RX ORDER — PREGABALIN 200 MG/1
200 CAPSULE ORAL 3 TIMES DAILY
Qty: 270 CAPSULE | Refills: 0 | Status: SHIPPED | OUTPATIENT
Start: 2022-07-26 | End: 2023-02-16

## 2022-07-26 RX ORDER — OCTREOTIDE ACETATE 50 UG/ML
50 INJECTION, SOLUTION INTRAVENOUS; SUBCUTANEOUS ONCE
Status: DISCONTINUED | OUTPATIENT
Start: 2022-07-26 | End: 2022-07-26

## 2022-07-26 RX ORDER — HYDROXYZINE HYDROCHLORIDE 25 MG/1
25 TABLET, FILM COATED ORAL EVERY 6 HOURS PRN
Status: DISCONTINUED | OUTPATIENT
Start: 2022-07-26 | End: 2022-08-11 | Stop reason: HOSPADM

## 2022-07-26 RX ORDER — POLYETHYLENE GLYCOL 3350 17 G/17G
17 POWDER, FOR SOLUTION ORAL DAILY
Status: DISCONTINUED | OUTPATIENT
Start: 2022-07-27 | End: 2022-07-27

## 2022-07-26 RX ORDER — DIPHENHYDRAMINE HYDROCHLORIDE 50 MG/ML
25 INJECTION INTRAMUSCULAR; INTRAVENOUS ONCE
Status: COMPLETED | OUTPATIENT
Start: 2022-07-26 | End: 2022-07-26

## 2022-07-26 RX ORDER — POTASSIUM CHLORIDE 1500 MG/1
20 TABLET, EXTENDED RELEASE ORAL
Status: ON HOLD | COMMUNITY
End: 2022-07-28

## 2022-07-26 RX ORDER — POTASSIUM CHLORIDE 750 MG/1
20 TABLET, EXTENDED RELEASE ORAL DAILY
Status: DISCONTINUED | OUTPATIENT
Start: 2022-07-27 | End: 2022-08-11 | Stop reason: HOSPADM

## 2022-07-26 RX ORDER — PREGABALIN 100 MG/1
200 CAPSULE ORAL 3 TIMES DAILY
Status: DISCONTINUED | OUTPATIENT
Start: 2022-07-26 | End: 2022-08-11 | Stop reason: HOSPADM

## 2022-07-26 RX ORDER — DEXTROSE MONOHYDRATE 25 G/50ML
25-50 INJECTION, SOLUTION INTRAVENOUS
Status: DISCONTINUED | OUTPATIENT
Start: 2022-07-26 | End: 2022-07-26

## 2022-07-26 RX ORDER — PRAZOSIN HYDROCHLORIDE 2 MG/1
2 CAPSULE ORAL AT BEDTIME
Status: DISCONTINUED | OUTPATIENT
Start: 2022-07-26 | End: 2022-07-26

## 2022-07-26 RX ORDER — ONDANSETRON 4 MG/1
4 TABLET, ORALLY DISINTEGRATING ORAL EVERY 6 HOURS PRN
Status: DISCONTINUED | OUTPATIENT
Start: 2022-07-26 | End: 2022-08-11 | Stop reason: HOSPADM

## 2022-07-26 RX ORDER — METHOCARBAMOL 500 MG/1
500-1000 TABLET, FILM COATED ORAL 3 TIMES DAILY PRN
Status: DISCONTINUED | OUTPATIENT
Start: 2022-07-26 | End: 2022-08-11 | Stop reason: HOSPADM

## 2022-07-26 RX ORDER — OLANZAPINE 10 MG/1
10 TABLET ORAL AT BEDTIME
Status: DISCONTINUED | OUTPATIENT
Start: 2022-07-26 | End: 2022-07-26

## 2022-07-26 RX ORDER — FOLIC ACID 1 MG/1
TABLET ORAL
COMMUNITY
Start: 2022-06-13 | End: 2022-07-26

## 2022-07-26 RX ORDER — ACETAMINOPHEN 325 MG/1
650 TABLET ORAL EVERY 6 HOURS PRN
Status: DISCONTINUED | OUTPATIENT
Start: 2022-07-26 | End: 2022-07-27

## 2022-07-26 RX ORDER — SODIUM CHLORIDE, SODIUM LACTATE, POTASSIUM CHLORIDE, CALCIUM CHLORIDE 600; 310; 30; 20 MG/100ML; MG/100ML; MG/100ML; MG/100ML
INJECTION, SOLUTION INTRAVENOUS CONTINUOUS
Status: DISCONTINUED | OUTPATIENT
Start: 2022-07-26 | End: 2022-07-27

## 2022-07-26 RX ORDER — FOLIC ACID 1 MG/1
1 TABLET ORAL DAILY
Status: DISCONTINUED | OUTPATIENT
Start: 2022-07-27 | End: 2022-08-11 | Stop reason: HOSPADM

## 2022-07-26 RX ORDER — FOLIC ACID 1 MG/1
1 TABLET ORAL DAILY
Qty: 90 TABLET | Refills: 3 | Status: SHIPPED | OUTPATIENT
Start: 2022-07-26

## 2022-07-26 RX ORDER — NICOTINE POLACRILEX 4 MG
15-30 LOZENGE BUCCAL
Status: DISCONTINUED | OUTPATIENT
Start: 2022-07-26 | End: 2022-07-26

## 2022-07-26 RX ORDER — NICOTINE POLACRILEX 4 MG
15-30 LOZENGE BUCCAL
Status: DISCONTINUED | OUTPATIENT
Start: 2022-07-26 | End: 2022-08-11 | Stop reason: HOSPADM

## 2022-07-26 RX ADMIN — SODIUM CHLORIDE, POTASSIUM CHLORIDE, SODIUM LACTATE AND CALCIUM CHLORIDE: 600; 310; 30; 20 INJECTION, SOLUTION INTRAVENOUS at 16:09

## 2022-07-26 RX ADMIN — HYDROXYZINE HYDROCHLORIDE 25 MG: 25 TABLET, FILM COATED ORAL at 19:34

## 2022-07-26 RX ADMIN — ACETAMINOPHEN 650 MG: 325 TABLET, FILM COATED ORAL at 22:29

## 2022-07-26 RX ADMIN — METHOCARBAMOL 500 MG: 500 TABLET ORAL at 22:30

## 2022-07-26 RX ADMIN — DIPHENHYDRAMINE HYDROCHLORIDE 25 MG: 50 INJECTION, SOLUTION INTRAMUSCULAR; INTRAVENOUS at 13:58

## 2022-07-26 RX ADMIN — CYANOCOBALAMIN 1000 MCG: 1000 INJECTION, SOLUTION INTRAMUSCULAR; SUBCUTANEOUS at 11:12

## 2022-07-26 RX ADMIN — SODIUM CHLORIDE, POTASSIUM CHLORIDE, SODIUM LACTATE AND CALCIUM CHLORIDE 1000 ML: 600; 310; 30; 20 INJECTION, SOLUTION INTRAVENOUS at 16:38

## 2022-07-26 RX ADMIN — OLANZAPINE 10 MG: 10 TABLET, FILM COATED ORAL at 21:34

## 2022-07-26 RX ADMIN — PANTOPRAZOLE SODIUM 80 MG: 40 INJECTION, POWDER, FOR SOLUTION INTRAVENOUS at 13:34

## 2022-07-26 RX ADMIN — PANTOPRAZOLE SODIUM 8 MG/HR: 40 INJECTION, POWDER, FOR SOLUTION INTRAVENOUS at 16:09

## 2022-07-26 RX ADMIN — IOPAMIDOL 100 ML: 755 INJECTION, SOLUTION INTRAVENOUS at 15:27

## 2022-07-26 RX ADMIN — PREGABALIN 200 MG: 100 CAPSULE ORAL at 19:34

## 2022-07-26 RX ADMIN — SODIUM CHLORIDE, PRESERVATIVE FREE 100 ML: 5 INJECTION INTRAVENOUS at 15:27

## 2022-07-26 RX ADMIN — SODIUM CHLORIDE, POTASSIUM CHLORIDE, SODIUM LACTATE AND CALCIUM CHLORIDE 1000 ML: 600; 310; 30; 20 INJECTION, SOLUTION INTRAVENOUS at 23:22

## 2022-07-26 ASSESSMENT — ENCOUNTER SYMPTOMS
VOMITING: 1
HEADACHES: 0
EYE REDNESS: 0
DIZZINESS: 1
FEVER: 0
DIARRHEA: 0
DYSURIA: 0
ANAL BLEEDING: 1
SHORTNESS OF BREATH: 0
NAUSEA: 1
JOINT SWELLING: 0
HEMATURIA: 0
ARTHRALGIAS: 1
ABDOMINAL PAIN: 0
CHILLS: 0
CONFUSION: 0
CONSTIPATION: 0
FREQUENCY: 0
LIGHT-HEADEDNESS: 1
ARTHRALGIAS: 0
CONSTIPATION: 1
DIFFICULTY URINATING: 0
NAUSEA: 1
COUGH: 0
NERVOUS/ANXIOUS: 1
SHORTNESS OF BREATH: 1
PALPITATIONS: 1
WEAKNESS: 1
COLOR CHANGE: 0
RECTAL PAIN: 1
DIARRHEA: 0
EYE PAIN: 1
HEMATOCHEZIA: 1
ABDOMINAL DISTENTION: 0
ABDOMINAL PAIN: 0
HEADACHES: 1
FEVER: 0
NECK STIFFNESS: 0
BLOOD IN STOOL: 1
PARESTHESIAS: 0
SORE THROAT: 1
MYALGIAS: 1
HEARTBURN: 0

## 2022-07-26 ASSESSMENT — ACTIVITIES OF DAILY LIVING (ADL)
TRANSFERRING: 1-->ASSISTANCE (EQUIPMENT/PERSON) NEEDED (NOT DEVELOPMENTALLY APPROPRIATE)
DOING_ERRANDS_INDEPENDENTLY_DIFFICULTY: YES
WEAR_GLASSES_OR_BLIND: YES
TOILETING_ISSUES: NO
TRANSFERRING: 1-->ASSISTANCE (EQUIPMENT/PERSON) NEEDED
WALKING_OR_CLIMBING_STAIRS_DIFFICULTY: YES
CONCENTRATING,_REMEMBERING_OR_MAKING_DECISIONS_DIFFICULTY: YES
DRESS: 0-->ASSISTANCE NEEDED (DEVELOPMENTALLY APPROPRIATE)
ADLS_ACUITY_SCORE: 35
DRESS: 1-->ASSISTANCE (EQUIPMENT/PERSON) NEEDED
BATHING: 1-->ASSISTANCE NEEDED
ADLS_ACUITY_SCORE: 43
DRESSING/BATHING_DIFFICULTY: YES
DIFFICULTY_EATING/SWALLOWING: NO
WALKING_OR_CLIMBING_STAIRS: AMBULATION DIFFICULTY, REQUIRES EQUIPMENT;STAIR CLIMBING DIFFICULTY, REQUIRES EQUIPMENT
VISION_MANAGEMENT: HAS GLASSES
CHANGE_IN_FUNCTIONAL_STATUS_SINCE_ONSET_OF_CURRENT_ILLNESS/INJURY: YES
FALL_HISTORY_WITHIN_LAST_SIX_MONTHS: YES
ADLS_ACUITY_SCORE: 34

## 2022-07-26 ASSESSMENT — PATIENT HEALTH QUESTIONNAIRE - PHQ9
SUM OF ALL RESPONSES TO PHQ QUESTIONS 1-9: 22
SUM OF ALL RESPONSES TO PHQ QUESTIONS 1-9: 22
10. IF YOU CHECKED OFF ANY PROBLEMS, HOW DIFFICULT HAVE THESE PROBLEMS MADE IT FOR YOU TO DO YOUR WORK, TAKE CARE OF THINGS AT HOME, OR GET ALONG WITH OTHER PEOPLE: EXTREMELY DIFFICULT

## 2022-07-26 ASSESSMENT — PAIN SCALES - GENERAL: PAINLEVEL: EXTREME PAIN (8)

## 2022-07-26 NOTE — LETTER
[facility] [address] [address]  [phone]    FACSIMILE TRANSMITTAL SHEET    TO: Intake  [facility]  FAX NUMBER: [phone]      FROM: JALYN Vernon   PHONE: [phone]  DATE: 08/09/22    NOTES/COMMENTS:   Please see attached referral for home care services. Possible discharge end of week. Patient will be staying with family in [place] or [place].   Patient will need skilled nursing, lymph wrap cares, and PT.   Thank you,   JALYN Spears                                    IF YOU DID NOT RECEIVE THE CORRECT NUMBER OF PAGES OR THE FAX DID NOT COME THROUGH CLEARLY, PLEASE CALL THE SENDER     CONFIDENTIALITY STATEMENT: Confidential information that may accompany this transmission contains protected health information under state and federal law and is legally privileged. This information is intended only for the use of the individual or entity named above and may be used only for carrying out treatment, payment or other healthcare operations. The recipient or person responsible for delivering this information is prohibited by law from disclosing this information without proper authorization to any other party, unless required to do so by law or regulation. If you are not the intended recipient, you are hereby notified that any review, dissemination, distribution, or copying of this message is strictly prohibited. If you have received this communication in error, please destroy the materials and contact us immediately by calling the number listed above. No response indicates that the information was received by the appropriate authorized party

## 2022-07-26 NOTE — ED TRIAGE NOTES
Pt BIBA- was @clinic for routine checkup and EMS was called for hgb of 6.4. Pt reports having rectal bleeding for past 3 weeks and has had syncopal episodes ever since. EMS had BP in 70s, has gone up to 90s since. On xarelto- history of PE. Received 4mg of zofran in ambulance. . Pt alert on arrival.

## 2022-07-26 NOTE — ED PROVIDER NOTES
[place] EMERGENCY DEPARTMENT ([facility])  7/26/22  History     Chief Complaint   Patient presents with     Rectal Bleeding     The history is provided by the patient and medical records.     Hilaria Bonner is a 31 year old female with a past medical history significant for s/p laparoscopic sleeve gastrectomy, alcohol abuse, acute massive PE (anticoagulated on Xarelto), hemorrhoids, GERD, acute pancreatitis, ARAMIS, and ischemic colitis who presents to the Emergency Department for evaluation of rectal bleeding.    Patient reports that 2 weeks ago she began to experience dizzy spells.  She describes these dizzy spells as feeling lightheaded and having syncopal episodes.  She states that she has been having these spells several times a day and one episode resulted in her falling.  She says that as a result of fall she broke her right foot which is currently healing and has a boot on it.  She adds that she still needs surgery for the right foot.  She notes that for the last few weeks she noticed bright red/maroon-colored blood in her stool along with episodes of nonbloody vomiting.  She says that she is also experiencing rectal pain with her symptoms.  She reports that she does have history of hemorrhoids.  She adds that she has not had her menstrual cycle within the last 2 years secondary to being on the Depo-Provera shot for birth control.  Patient denies experiencing similar symptoms in the past.  Patient reports that she is currently on Xarelto secondary to history of acute massive PE and cardiac arrest that occurred in 2018.  Patient denies abdominal pain.    Per EMS, patient was at clinic today for routine checkup when her hemoglobin was found to be 6.4.  EMS was called to the scene to transfer patient to the ED.  Patient reported at the time that she had a experiencing rectal bleeding for the past few weeks and had a couple episodes at home but was not evaluated at the time of the syncopal episodes.   Patient is currently on Xarelto secondary to history of large PE with cardiac arrest.  Patient's blood pressure was in the 90s, blood sugar was 189.  Patient was given Zofran 4 mg for nausea and vomiting in route to the ED today.  Patient denies recent illness or travel.      Past Medical History:   Diagnosis Date     Acute kidney injury (H) 2019     Acute massive pulmonary embolism (H) 2019     Acute pancreatitis 2018     Acute pancreatitis 2021    due to ETOH     Acute thoracic back pain 2021     ARAMIS (acute kidney injury) (H) 2019     Cardiac arrest, cause unspecified (H) 2019    massive pulmonary embolism with pulseless electrical activity cardiac arrest in May 2019 following gastric bypass in 2019      Depression with anxiety      GERD (gastroesophageal reflux disease)      History of alcohol use disorder      HTN (hypertension)      Infection due to 2019 novel coronavirus 2020    COVID19 infection in 2020     Ischemic colitis (H) 2019     Morbid obesity (H)      Scalp laceration, initial encounter 2020       Past Surgical History:   Procedure Laterality Date      SECTION       LAPAROSCOPIC GASTRIC SLEEVE N/A 2019    Procedure: Laparoscopic Sleeve Gastrectomy;  Surgeon: Luan Lopez MD;  Location: [facility] OR     ORTHOPEDIC SURGERY      2 knee meniscus surgery       Family History   Problem Relation Age of Onset     Pulmonary Embolism Mother      Diabetes Father      Hypertension Father      Breast Cancer Sister 26        surgery only     Cancer Sister      Breast Cancer Sister      Mental Illness Sister         Bipolar     Coronary Artery Disease Other         paternal aunt     Thyroid Disease Other         neice     Coronary Artery Disease Other      Cerebrovascular Disease Other      Thyroid Disease Other      Liver Disease No family hx of      Colon Cancer No family hx of        Social History     Tobacco Use     Smoking  status: Current Every Day Smoker     Packs/day: 0.25     Years: 1.00     Pack years: 0.25     Types: Cigarettes     Start date: 11/20/2016     Smokeless tobacco: Never Used   Substance Use Topics     Alcohol use: Not Currently     Comment: Quit drinking when last hospitalized       Current Facility-Administered Medications   Medication     cyanocobalamin injection 1,000 mcg     cyanocobalamin injection 1,000 mcg     lactated ringers infusion     medroxyPROGESTERone (DEPO-PROVERA) injection 150 mg     octreotide (sandoSTATIN) 1,250 mcg in D5W 250 mL     octreotide (sandoSTATIN) injection 50 mcg     pantoprazole (PROTONIX) 80 mg in sodium chloride 0.9 % 100 mL infusion     Current Outpatient Medications   Medication     alcohol swab prep pads     ammonium lactate (AMLACTIN) 12 % external cream     Biotin 5000 MCG TABS     blood glucose (NO BRAND SPECIFIED) test strip     blood glucose calibration (NO BRAND SPECIFIED) solution     Blood Glucose Monitoring Suppl (ACCU-CHEK GUIDE) w/Device KIT     calcium Citrate-vitamin D 500-400 MG-UNIT CHEW     diphenhydrAMINE (BENADRYL) 50 MG capsule     EPINEPHrine (ANY BX GENERIC EQUIV) 0.3 MG/0.3ML injection 2-pack     erythromycin (ROMYCIN) 5 MG/GM ophthalmic ointment     hydrochlorothiazide (HYDRODIURIL) 12.5 MG tablet     hydrOXYzine (ATARAX) 25 MG tablet     lisinopril (ZESTRIL) 10 MG tablet     medroxyPROGESTERone (DEPO-PROVERA) 150 MG/ML IM injection     metFORMIN (GLUCOPHAGE XR) 500 MG 24 hr tablet     methocarbamol (ROBAXIN) 500 MG tablet     Multiple Vitamins-Minerals (MULTIVITAMIN ADULT) CHEW     OLANZapine (ZYPREXA) 10 MG tablet     omeprazole (PRILOSEC) 20 MG DR capsule     ondansetron (ZOFRAN-ODT) 4 MG ODT tab     polyethylene glycol (MIRALAX) 17 GM/SCOOP powder     potassium chloride ER (K-TAB/KLOR-CON) 10 MEQ CR tablet     potassium chloride ER (KLOR-CON M) 20 MEQ CR tablet     prazosin (MINIPRESS) 1 MG capsule     Pregabalin (LYRICA) 200 MG capsule     PRIVIGEN 20  GM/200ML SOLN     PRIVIGEN 40 GM/400ML SOLN     SODIUM CHLORIDE FLUSH 0.9 % flush     SOLU-CORTEF 100 MG injection     thin (NO BRAND SPECIFIED) lancets     venlafaxine (EFFEXOR XR) 75 MG 24 hr capsule     vitamin B-Complex     vitamin C (ASCORBIC ACID) 1000 MG TABS     vitamin D3 (CHOLECALCIFEROL) 50 mcg (2000 units) tablet     XARELTO ANTICOAGULANT 20 MG TABS tablet     folic acid (FOLVITE) 1 MG tablet      No Known Allergies     I have reviewed the Medications, Allergies, Past Medical and Surgical History, and Social History in the Epic system.    Review of Systems   Constitutional: Negative for fever.   HENT: Negative for congestion.    Eyes: Negative for redness.   Respiratory: Negative for shortness of breath.    Cardiovascular: Negative for chest pain.   Gastrointestinal: Positive for anal bleeding, blood in stool, nausea, rectal pain and vomiting (nonbloody). Negative for abdominal distention, abdominal pain, constipation and diarrhea.   Genitourinary: Negative for difficulty urinating.   Musculoskeletal: Negative for arthralgias and neck stiffness.   Skin: Negative for color change.   Neurological: Positive for syncope and light-headedness. Negative for headaches.   Psychiatric/Behavioral: Negative for confusion.     A complete review of systems was performed with pertinent positives and negatives noted in the HPI, and all other systems negative.    Physical Exam   BP: (!) 94/28  Pulse: 99  Temp: 98.1  F (36.7  C)  Resp: 20  SpO2: 99 %      Physical Exam  Vitals and nursing note reviewed.   Constitutional:       General: She is not in acute distress.     Appearance: She is not diaphoretic.   HENT:      Head: Atraumatic.      Mouth/Throat:      Pharynx: No oropharyngeal exudate.   Eyes:      General: No scleral icterus.     Pupils: Pupils are equal, round, and reactive to light.   Cardiovascular:      Rate and Rhythm: Normal rate and regular rhythm.      Heart sounds: Normal heart sounds. No murmur heard.     No friction rub. No gallop.   Pulmonary:      Effort: Pulmonary effort is normal. No respiratory distress.      Breath sounds: Normal breath sounds. No stridor. No wheezing, rhonchi or rales.   Chest:      Chest wall: No tenderness.   Abdominal:      General: Abdomen is flat. Bowel sounds are normal. There is no distension.      Palpations: Abdomen is soft. There is no mass.      Tenderness: There is no abdominal tenderness. There is no right CVA tenderness, left CVA tenderness, guarding or rebound.      Hernia: No hernia is present.   Genitourinary:     Rectum: Guaiac result positive (mucos strongly positive).   Musculoskeletal:         General: No tenderness.      Cervical back: Neck supple.   Skin:     General: Skin is warm.      Findings: No rash.   Neurological:      General: No focal deficit present.      Cranial Nerves: No cranial nerve deficit.      Sensory: No sensory deficit.      Motor: Weakness (diffuse) present.      Coordination: Coordination normal.      Gait: Gait normal.      Deep Tendon Reflexes: Reflexes normal.         ED Course     At 1:13 PM the patient was seen and examined by Pee Gibson MD in Room ED03.     ED Course as of 07/26/22 1824 Tue Jul 26, 2022   1602 Bicarbonate Venous POCT(!): 16   1603 Bicarbonate Venous POCT(!): 17   1603 Hemoglobin POCT(!!): 6.1   1603 Glucose Whole Blood POCT(!): 123     Procedures              EKG Interpretation:      Interpreted by Pee Gibson MD, MD  Time reviewed: on arrival  Symptoms at time of EKG: weakness   Rhythm: normal sinus   Rate: normal  Axis: normal  Ectopy: none  Conduction: normal  ST Segments/ T Waves: No ST-T wave changes  Q Waves: none  Comparison to prior: Unchanged from 10/2020    Clinical Impression: normal EKG           Critical Care Addendum    My initial assessment, based on my review of prehospital provider report, review of nursing observations, review of vital signs, focused history, physical exam, review of cardiac rhythm  monitor, 12 lead ECG analysis and discussion with GI MICU, established that Hilaria Bonner has severe hemorrhage and severe hypotension, which requires immediate intervention, and therefore she is critically ill.     After the initial assessment, the care team initiated multiple lab tests, initiated IV fluid administration and initiated medication therapy with PPI Octreotide PRBC to provide stabilization care. Due to the critical nature of this patient, I reassessed nursing observations, vital signs, physical exam, review of cardiac rhythm monitor, 12 lead ECG analysis, mental status, neurologic status and respiratory status multiple times prior to her disposition.     Time also spent performing documentation, discussion with family to obtain medical information for decision making, reviewing test results, discussion with consultants and coordination of care.     Critical care time (excluding teaching time and procedures): 60 minutes.       Results for orders placed or performed during the hospital encounter of 07/26/22 (from the past 24 hour(s))   Prepare red blood cells (unit)   Result Value Ref Range    CROSSMATCH Compatible     UNIT ABO/RH A Pos     Unit Number [id number]     Unit Status Issued     Blood Component Type Red Blood Cells     Product Code P5887X23     CODING SYSTEM [id number]     UNIT TYPE ISBT 6200     ISSUE DATE AND TIME [id number]    Prepare red blood cells (unit)   Result Value Ref Range    CROSSMATCH Compatible     UNIT ABO/RH A Pos     Unit Number [id number]     Unit Status Issued     Blood Component Type Red Blood Cells     Product Code [id number]     CODING SYSTEM [id number]     UNIT TYPE ISBT 6200     ISSUE DATE AND TIME [id number]    CBC with platelets differential    Narrative    The following orders were created for panel order CBC with platelets differential.  Procedure                               Abnormality         Status                     ---------                                -----------         ------                     CBC with platelets and d...[[id number]]  Abnormal            Final result                 Please view results for these tests on the individual orders.   ABO/Rh type and screen *Canceled*    Narrative    The following orders were created for panel order ABO/Rh type and screen.  Procedure                               Abnormality         Status                     ---------                               -----------         ------                     Adult Type and Screen[[id number]]                                                         Please view results for these tests on the individual orders.   INR   Result Value Ref Range    INR 1.95 (H) 0.85 - 1.15   Comprehensive metabolic panel   Result Value Ref Range    Sodium 137 136 - 145 mmol/L    Potassium 4.2 3.4 - 5.3 mmol/L    Creatinine 2.14 (H) 0.51 - 0.95 mg/dL    Urea Nitrogen 19.5 6.0 - 20.0 mg/dL    Chloride 101 98 - 107 mmol/L    Carbon Dioxide (CO2) 17 (L) 22 - 29 mmol/L    Anion Gap 19 (H) 7 - 15 mmol/L    Glucose 132 (H) 70 - 99 mg/dL    Calcium 7.9 (L) 8.6 - 10.0 mg/dL    Protein Total 5.9 (L) 6.4 - 8.3 g/dL    Albumin 2.4 (L) 3.5 - 5.2 g/dL    Bilirubin Total 2.6 (H) <=1.2 mg/dL    Alkaline Phosphatase 283 (H) 35 - 104 U/L    AST       (H) 10 - 35 U/L    GFR Estimate 31 (L) >60 mL/min/1.73m2   HCG quantitative pregnancy (blood)   Result Value Ref Range    hCG Quantitative <1 <5 mIU/mL   CBC with platelets and differential   Result Value Ref Range    WBC Count 12.4 (H) 4.0 - 11.0 10e3/uL    RBC Count 1.87 (L) 3.80 - 5.20 10e6/uL    Hemoglobin 6.4 (LL) 11.7 - 15.7 g/dL    Hematocrit 20.5 (L) 35.0 - 47.0 %     (H) 78 - 100 fL    MCH 34.2 (H) 26.5 - 33.0 pg    MCHC 31.2 (L) 31.5 - 36.5 g/dL    RDW 14.7 10.0 - 15.0 %    Platelet Count 405 150 - 450 10e3/uL    % Neutrophils 69 %    % Lymphocytes 23 %    % Monocytes 7 %    % Eosinophils 0 %    % Basophils 0 %    % Immature Granulocytes 1 %     NRBCs per 100 WBC 0 <1 /100    Absolute Neutrophils 8.6 (H) 1.6 - 8.3 10e3/uL    Absolute Lymphocytes 2.8 0.8 - 5.3 10e3/uL    Absolute Monocytes 0.9 0.0 - 1.3 10e3/uL    Absolute Eosinophils 0.0 0.0 - 0.7 10e3/uL    Absolute Basophils 0.0 0.0 - 0.2 10e3/uL    Absolute Immature Granulocytes 0.1 <=0.4 10e3/uL    Absolute NRBCs 0.0 10e3/uL   ABO/Rh type and screen    Narrative    The following orders were created for panel order ABO/Rh type and screen.  Procedure                               Abnormality         Status                     ---------                               -----------         ------                     Adult Type and Screen[[id number]]                            Final result                 Please view results for these tests on the individual orders.   Adult Type and Screen   Result Value Ref Range    ABO/RH(D) A POS     Antibody Screen Negative Negative    SPECIMEN EXPIRATION DATE [id number]    [place] Draw    Narrative    The following orders were created for panel order [place] Draw.  Procedure                               Abnormality         Status                     ---------                               -----------         ------                     Extra Red Top Tube[[id number]]                               Final result                 Please view results for these tests on the individual orders.   Extra Red Top Tube   Result Value Ref Range    Hold Specimen JIC    iStat Gases (lactate) venous, POCT   Result Value Ref Range    Lactic Acid POCT 7.3 (HH) <=2.0 mmol/L    Bicarbonate Venous POCT 17 (L) 21 - 28 mmol/L    O2 Sat, Venous POCT 20 (L) 94 - 100 %    pCO2V Venous POCT 33 (L) 40 - 50 mm Hg    pH Venous POCT 7.33 7.32 - 7.43    pO2 Venous POCT 16 (L) 25 - 47 mm Hg   iStat Gases Electrolytes ICA Glucose Venous, POCT   Result Value Ref Range    CPB Applied No     Hematocrit POCT 18 (L) 35 - 47 %    Calcium, Ionized Whole Blood POCT 3.9 (L) 4.4 - 5.2 mg/dL    Glucose Whole Blood  POCT 123 (H) 70 - 99 mg/dL    Bicarbonate Venous POCT 16 (L) 21 - 28 mmol/L    Hemoglobin POCT 6.1 (LL) 11.7 - 15.7 g/dL    Potassium POCT 3.8 3.4 - 5.3 mmol/L    Sodium POCT 139 133 - 144 mmol/L    pCO2 Venous POCT 30 (L) 40 - 50 mm Hg    pO2 Venous POCT 21 (L) 25 - 47 mm Hg    pH Venous POCT 7.33 7.32 - 7.43    O2 Sat, Venous POCT 31 (L) 94 - 100 %   EKG 12-lead, tracing only   Result Value Ref Range    Systolic Blood Pressure  mmHg    Diastolic Blood Pressure  mmHg    Ventricular Rate 95 BPM    Atrial Rate 95 BPM    IL Interval 152 ms    QRS Duration 66 ms     ms    QTc 457 ms    P Axis 34 degrees    R AXIS 3 degrees    T Axis 15 degrees    Interpretation ECG Sinus rhythm  Normal ECG      CTA Abdomen Pelvis with Contrast    Narrative    CTA ABDOMEN AND PELVIS 7/26/2022 3:45 PM    CLINICAL HISTORY: GI bleed with hypotension.     COMPARISONS: CT 11/5/2020. Thoracic spine MR 9/29/2021.    REFERRING PROVIDER: EVARISTO ZAMAN    TECHNIQUE: Unenhanced CT performed through the abdomen and pelvis.  Contrast administered and patient reported discomfort. Contrast  stopped after 31.8 mL administered. No infiltration or extravasation  was noted by the technologist. Other arm was used and contrast was  administered without complaint. Following the uneventful  administration of intravenous contrast, somewhat delayed CTA performed  through the abdomen and pelvis. After an 80 second delay, CT repeated  through the abdomen and pelvis. Coronal and sagittal reconstructions  produced. Coronal MIP reconstructions produced.    3D and multiplanar reconstructions were unavailable at the time of  dictation.    CONTRAST: 131.8 mL Isovue 370    DOSE (DLP): 2106 mGy*cm    FINDINGS: CTA: No active extravasation demonstrated.    Aorta patent without stenosis, dissection, or aneurysm. Celiac,  superior mesenteric, bilateral single renal, and inferior mesenteric  arteries patent. Bilateral common, internal, and external iliac  arteries  patent. Bilateral common femoral arteries patent.    Bilateral common femoral veins patent. Bilateral common, internal, and  external iliac veins patent. Inferior vena cava patent. Renal and  hepatic veins patent.    Splenic, superior mesenteric, and portal veins patent.    CT: Lung bases clear.    Hepatosteatosis.    Sleeve gastrectomy postoperative changes. Bowel decompressed.    Gallbladder sludge suggested. No pericholecystic fluid.    T9 compression fracture, similar to previous.      Impression    IMPRESSION: No active extravasation demonstrated.    JACOB LOMAX MD         SYSTEM ID:  [id number]   iStat Gases (lactate) venous, POCT   Result Value Ref Range    Lactic Acid POCT 6.5 (HH) <=2.0 mmol/L    Bicarbonate Venous POCT 18 (L) 21 - 28 mmol/L    O2 Sat, Venous POCT 74 (L) 94 - 100 %    pCO2V Venous POCT 30 (L) 40 - 50 mm Hg    pH Venous POCT 7.37 7.32 - 7.43    pO2 Venous POCT 40 25 - 47 mm Hg     *Note: Due to a large number of results and/or encounters for the requested time period, some results have not been displayed. A complete set of results can be found in Results Review.     Medications   pantoprazole (PROTONIX) 80 mg in sodium chloride 0.9 % 100 mL infusion (8 mg/hr Intravenous New Bag 7/26/22 1609)   lactated ringers infusion ( Intravenous New Bag 7/26/22 1609)   octreotide (sandoSTATIN) injection 50 mcg (has no administration in time range)   octreotide (sandoSTATIN) 1,250 mcg in D5W 250 mL (has no administration in time range)   pantoprazole (PROTONIX) IV push injection 80 mg (80 mg Intravenous Given 7/26/22 1334)   diphenhydrAMINE (BENADRYL) injection 25 mg (25 mg Intravenous Given 7/26/22 1358)   CT saline (100 mLs Intravenous Given 7/26/22 1527)   iopamidol (ISOVUE-370) solution 100 mL (100 mLs Intravenous Given 7/26/22 1527)   lactated ringers BOLUS 1,000 mL (0 mLs Intravenous Stopped 7/26/22 1735)             Assessments & Plan (with Medical Decision Making)   Acute vs Subacute GI bleed with  Hgb 6 and SBP initially 70's at the clinic in the pt with h/o massive PE with arrest on xarelto, dizziness-fall few weeks ago with right foot fx, now maroon to red blood per rectum  ?hemorrhoids per pt, initial lactic acid 7-LR and PRBC then repeat still lactic acid 6 and SBP 's, PPI and Octreotide started, D/W GI and MICU-admit to MICU. CTA abd pelvis no extravasation but now associated with acute kidney and liver injury.    I have reviewed the nursing notes.    I have reviewed the findings, diagnosis, plan and need for follow up with the patient.    New Prescriptions    No medications on file       Final diagnoses:   Gastrointestinal hemorrhage, unspecified gastrointestinal hemorrhage type   Hypotension due to hypovolemia   Elevated lactic acid level   Acute kidney injury (H)   Chronic anticoagulation   Elevated LFTs       IJaneth am serving as a trained medical scribe to document services personally performed by Pee Gibson MD, based on the provider's statements to me.      IPee MD, was physically present and have reviewed and verified the accuracy of this note documented by Janeth Bojorquez.     Pee Gibson MD  7/26/2022   [facility] EMERGENCY DEPARTMENT     Pee Gibson MD  07/26/22 1132
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
## 2022-07-26 NOTE — NURSING NOTE
BP: 122/80    LAST PAP/EXAM:   Lab Results   Component Value Date    PAP NIL 09/27/2019     URINE HCG:negative    The following medication was given:     MEDICATION: Depo Provera 150mg  ROUTE: IM  SITE: Deltoid - Right lower  : [employer]  LOT #: [id number]  EXP:09/23  NEXT INJECTION DUE: 10/11/22 - 10/25/22   Provider: Crissy Crowe MA

## 2022-07-26 NOTE — LETTER
[facility] [address] [address]  [phone]    FACSIMILE TRANSMITTAL SHEET    TO: Intake  FAX NUMBER: [phone]    FROM: JALYN Vernon   PHONE: [phone]  DATE: 08/09/22      NOTES/COMMENTS:   Please see attached referral for home care services. Possible discharge end of week. Patient will be staying with family in [place] or [place].  Patient will need skilled nursing, lymph wrap cares, and PT.   Thank you,   JALYN Spears        IF YOU DID NOT RECEIVE THE CORRECT NUMBER OF PAGES OR THE FAX DID NOT COME THROUGH CLEARLY, PLEASE CALL THE SENDER     CONFIDENTIALITY STATEMENT: Confidential information that may accompany this transmission contains protected health information under state and federal law and is legally privileged. This information is intended only for the use of the individual or entity named above and may be used only for carrying out treatment, payment or other healthcare operations. The recipient or person responsible for delivering this information is prohibited by law from disclosing this information without proper authorization to any other party, unless required to do so by law or regulation. If you are not the intended recipient, you are hereby notified that any review, dissemination, distribution, or copying of this message is strictly prohibited. If you have received this communication in error, please destroy the materials and contact us immediately by calling the number listed above. No response indicates that the information was received by the appropriate authorized party

## 2022-07-26 NOTE — LETTER
[facility] [address] [place]  [address] [address]  [phone]    FACSIMILE TRANSMITTAL SHEET    Hi -   Please consider this referral.  She is ready to discharge home as soon as tomorrow. She will be staying in [place] or [place] with family.  She will need nursing care for general assessment, PT and help with lymph wraps.  Her insurance is [facility].     Thank you,   JALYN Spears    Phone: [phone]                                    IF YOU DID NOT RECEIVE THE CORRECT NUMBER OF PAGES OR THE FAX DID NOT COME THROUGH CLEARLY, PLEASE CALL THE SENDER     CONFIDENTIALITY STATEMENT: Confidential information that may accompany this transmission contains protected health information under state and federal law and is legally privileged. This information is intended only for the use of the individual or entity named above and may be used only for carrying out treatment, payment or other healthcare operations. The recipient or person responsible for delivering this information is prohibited by law from disclosing this information without proper authorization to any other party, unless required to do so by law or regulation. If you are not the intended recipient, you are hereby notified that any review, dissemination, distribution, or copying of this message is strictly prohibited. If you have received this communication in error, please destroy the materials and contact us immediately by calling the number listed above. No response indicates that the information was received by the appropriate authorized party

## 2022-07-26 NOTE — LETTER
[facility] [address] [address]  [phone]    FACSIMILE TRANSMITTAL SHEET    TO: Intake      FROM: JALYN Vernon   PHONE: [phone]  DATE: 08/09/22    NOTES/COMMENTS:   Please see attached referral for home care services. Possible discharge end of week.  Patient will need skilled nursing, lymph wrap cares, and PT.   Thank you,   JALYN Spears                                      IF YOU DID NOT RECEIVE THE CORRECT NUMBER OF PAGES OR THE FAX DID NOT COME THROUGH CLEARLY, PLEASE CALL THE SENDER     CONFIDENTIALITY STATEMENT: Confidential information that may accompany this transmission contains protected health information under state and federal law and is legally privileged. This information is intended only for the use of the individual or entity named above and may be used only for carrying out treatment, payment or other healthcare operations. The recipient or person responsible for delivering this information is prohibited by law from disclosing this information without proper authorization to any other party, unless required to do so by law or regulation. If you are not the intended recipient, you are hereby notified that any review, dissemination, distribution, or copying of this message is strictly prohibited. If you have received this communication in error, please destroy the materials and contact us immediately by calling the number listed above. No response indicates that the information was received by the appropriate authorized party

## 2022-07-26 NOTE — PATIENT INSTRUCTIONS
Go to the EMERGENCY DEPARTMENT for rectal bleeding and acute anemia (low blood iron)  Patient Education      Preventive Health Recommendations  Female Ages 26 - 39  Yearly exam:   See your health care provider every year in order to  Review health changes.   Discuss preventive care.    Review your medicines if you your doctor has prescribed any.    Until age 30: Get a Pap test every three years (more often if you have had an abnormal result).    After age 30: Talk to your doctor about whether you should have a Pap test every 3 years or have a Pap test with HPV screening every 5 years.   You do not need a Pap test if your uterus was removed (hysterectomy) and you have not had cancer.  You should be tested each year for STDs (sexually transmitted diseases), if you're at risk.   Talk to your provider about how often to have your cholesterol checked.  If you are at risk for diabetes, you should have a diabetes test (fasting glucose).  Shots: Get a flu shot each year. Get a tetanus shot every 10 years.   Nutrition:   Eat at least 5 servings of fruits and vegetables each day.  Eat whole-grain bread, whole-wheat pasta and brown rice instead of white grains and rice.  Get adequate Calcium and Vitamin D.     Lifestyle  Exercise at least 150 minutes a week (30 minutes a day, 5 days of the week). This will help you control your weight and prevent disease.  Limit alcohol to one drink per day.  No smoking.   Wear sunscreen to prevent skin cancer.  See your dentist every six months for an exam and cleaning.

## 2022-07-27 ENCOUNTER — APPOINTMENT (OUTPATIENT)
Dept: GENERAL RADIOLOGY | Facility: CLINIC | Age: 32
End: 2022-07-27
Payer: COMMERCIAL

## 2022-07-27 ENCOUNTER — APPOINTMENT (OUTPATIENT)
Dept: OCCUPATIONAL THERAPY | Facility: CLINIC | Age: 32
End: 2022-07-27
Payer: COMMERCIAL

## 2022-07-27 ENCOUNTER — APPOINTMENT (OUTPATIENT)
Dept: CARDIOLOGY | Facility: CLINIC | Age: 32
End: 2022-07-27
Payer: COMMERCIAL

## 2022-07-27 LAB
ANION GAP SERPL CALCULATED.3IONS-SCNC: 16 MMOL/L (ref 7–15)
BASOPHILS # BLD MANUAL: 0.1 10E3/UL (ref 0–0.2)
BASOPHILS NFR BLD MANUAL: 1 %
BILIRUB DIRECT SERPL-MCNC: 2.28 MG/DL (ref 0–0.3)
BUN SERPL-MCNC: 17.9 MG/DL (ref 6–20)
BURR CELLS BLD QL SMEAR: SLIGHT
C COLI+JEJUNI+LARI FUSA STL QL NAA+PROBE: NOT DETECTED
CALCIUM SERPL-MCNC: 7.6 MG/DL (ref 8.6–10)
CHLORIDE SERPL-SCNC: 105 MMOL/L (ref 98–107)
CREAT SERPL-MCNC: 2 MG/DL (ref 0.51–0.95)
DEPRECATED HCO3 PLAS-SCNC: 17 MMOL/L (ref 22–29)
EC STX1 GENE STL QL NAA+PROBE: NOT DETECTED
EC STX2 GENE STL QL NAA+PROBE: NOT DETECTED
EOSINOPHIL # BLD MANUAL: 0.1 10E3/UL (ref 0–0.7)
EOSINOPHIL NFR BLD MANUAL: 1 %
ERYTHROCYTE [DISTWIDTH] IN BLOOD BY AUTOMATED COUNT: 18.6 % (ref 10–15)
ERYTHROCYTE [DISTWIDTH] IN BLOOD BY AUTOMATED COUNT: 19.4 % (ref 10–15)
GFR SERPL CREATININE-BSD FRML MDRD: 33 ML/MIN/1.73M2
GLUCOSE BLDC GLUCOMTR-MCNC: 128 MG/DL (ref 70–99)
GLUCOSE BLDC GLUCOMTR-MCNC: 180 MG/DL (ref 70–99)
GLUCOSE BLDC GLUCOMTR-MCNC: 72 MG/DL (ref 70–99)
GLUCOSE BLDC GLUCOMTR-MCNC: 82 MG/DL (ref 70–99)
GLUCOSE BLDC GLUCOMTR-MCNC: 87 MG/DL (ref 70–99)
GLUCOSE BLDC GLUCOMTR-MCNC: 89 MG/DL (ref 70–99)
GLUCOSE SERPL-MCNC: 71 MG/DL (ref 70–99)
HAPTOGLOB SERPL-MCNC: 73 MG/DL (ref 32–197)
HCT VFR BLD AUTO: 24.3 % (ref 35–47)
HCT VFR BLD AUTO: 24.5 % (ref 35–47)
HGB BLD-MCNC: 7.8 G/DL (ref 11.7–15.7)
HGB BLD-MCNC: 7.9 G/DL (ref 11.7–15.7)
HGB BLD-MCNC: 8.1 G/DL (ref 11.7–15.7)
HGB BLD-MCNC: 9.2 G/DL (ref 11.7–15.7)
HOLD SPECIMEN: NORMAL
LACTATE SERPL-SCNC: 2.2 MMOL/L (ref 0.7–2)
LACTATE SERPL-SCNC: 3.2 MMOL/L (ref 0.7–2)
LACTATE SERPL-SCNC: 4 MMOL/L (ref 0.7–2)
LVEF ECHO: NORMAL
LYMPHOCYTES # BLD MANUAL: 2.1 10E3/UL (ref 0.8–5.3)
LYMPHOCYTES NFR BLD MANUAL: 18 %
MCH RBC QN AUTO: 33.1 PG (ref 26.5–33)
MCH RBC QN AUTO: 33.2 PG (ref 26.5–33)
MCHC RBC AUTO-ENTMCNC: 32.2 G/DL (ref 31.5–36.5)
MCHC RBC AUTO-ENTMCNC: 33.3 G/DL (ref 31.5–36.5)
MCV RBC AUTO: 103 FL (ref 78–100)
MCV RBC AUTO: 99 FL (ref 78–100)
MONOCYTES # BLD MANUAL: 0.6 10E3/UL (ref 0–1.3)
MONOCYTES NFR BLD MANUAL: 5 %
NEUTROPHILS # BLD MANUAL: 8.8 10E3/UL (ref 1.6–8.3)
NEUTROPHILS NFR BLD MANUAL: 75 %
NOROV GI+II ORF1-ORF2 JNC STL QL NAA+PR: NOT DETECTED
PLAT MORPH BLD: ABNORMAL
PLATELET # BLD AUTO: 172 10E3/UL (ref 150–450)
PLATELET # BLD AUTO: 222 10E3/UL (ref 150–450)
POLYCHROMASIA BLD QL SMEAR: SLIGHT
POTASSIUM SERPL-SCNC: 4.8 MMOL/L (ref 3.4–5.3)
RBC # BLD AUTO: 2.38 10E6/UL (ref 3.8–5.2)
RBC # BLD AUTO: 2.45 10E6/UL (ref 3.8–5.2)
RBC MORPH BLD: ABNORMAL
RVA NSP5 STL QL NAA+PROBE: NOT DETECTED
SALMONELLA SP RPOD STL QL NAA+PROBE: NOT DETECTED
SHIGELLA SP+EIEC IPAH STL QL NAA+PROBE: NOT DETECTED
SODIUM SERPL-SCNC: 138 MMOL/L (ref 136–145)
V CHOL+PARA RFBL+TRKH+TNAA STL QL NAA+PR: NOT DETECTED
WBC # BLD AUTO: 11.7 10E3/UL (ref 4–11)
WBC # BLD AUTO: 12.8 10E3/UL (ref 4–11)
Y ENTERO RECN STL QL NAA+PROBE: NOT DETECTED

## 2022-07-27 PROCEDURE — 258N000003 HC RX IP 258 OP 636: Performed by: STUDENT IN AN ORGANIZED HEALTH CARE EDUCATION/TRAINING PROGRAM

## 2022-07-27 PROCEDURE — 97530 THERAPEUTIC ACTIVITIES: CPT | Mod: GO

## 2022-07-27 PROCEDURE — 250N000011 HC RX IP 250 OP 636

## 2022-07-27 PROCEDURE — C9113 INJ PANTOPRAZOLE SODIUM, VIA: HCPCS

## 2022-07-27 PROCEDURE — 83605 ASSAY OF LACTIC ACID: CPT | Performed by: STUDENT IN AN ORGANIZED HEALTH CARE EDUCATION/TRAINING PROGRAM

## 2022-07-27 PROCEDURE — 36415 COLL VENOUS BLD VENIPUNCTURE: CPT | Performed by: STUDENT IN AN ORGANIZED HEALTH CARE EDUCATION/TRAINING PROGRAM

## 2022-07-27 PROCEDURE — 258N000003 HC RX IP 258 OP 636: Performed by: INTERNAL MEDICINE

## 2022-07-27 PROCEDURE — 99233 SBSQ HOSP IP/OBS HIGH 50: CPT | Mod: GC | Performed by: INTERNAL MEDICINE

## 2022-07-27 PROCEDURE — 93306 TTE W/DOPPLER COMPLETE: CPT | Mod: 26 | Performed by: INTERNAL MEDICINE

## 2022-07-27 PROCEDURE — 200N000002 HC R&B ICU UMMC

## 2022-07-27 PROCEDURE — 99356 PR PROLONGED SERV,INPATIENT,1ST HR: CPT | Mod: GC | Performed by: INTERNAL MEDICINE

## 2022-07-27 PROCEDURE — 73630 X-RAY EXAM OF FOOT: CPT | Mod: RT

## 2022-07-27 PROCEDURE — 250N000013 HC RX MED GY IP 250 OP 250 PS 637: Performed by: INTERNAL MEDICINE

## 2022-07-27 PROCEDURE — 73630 X-RAY EXAM OF FOOT: CPT | Mod: 26 | Performed by: RADIOLOGY

## 2022-07-27 PROCEDURE — 250N000013 HC RX MED GY IP 250 OP 250 PS 637

## 2022-07-27 PROCEDURE — 97535 SELF CARE MNGMENT TRAINING: CPT | Mod: GO

## 2022-07-27 PROCEDURE — 83605 ASSAY OF LACTIC ACID: CPT

## 2022-07-27 PROCEDURE — 97166 OT EVAL MOD COMPLEX 45 MIN: CPT | Mod: GO

## 2022-07-27 PROCEDURE — 80048 BASIC METABOLIC PNL TOTAL CA: CPT | Performed by: INTERNAL MEDICINE

## 2022-07-27 PROCEDURE — 250N000013 HC RX MED GY IP 250 OP 250 PS 637: Performed by: STUDENT IN AN ORGANIZED HEALTH CARE EDUCATION/TRAINING PROGRAM

## 2022-07-27 PROCEDURE — 87506 IADNA-DNA/RNA PROBE TQ 6-11: CPT | Performed by: STUDENT IN AN ORGANIZED HEALTH CARE EDUCATION/TRAINING PROGRAM

## 2022-07-27 PROCEDURE — 94660 CPAP INITIATION&MGMT: CPT

## 2022-07-27 PROCEDURE — 36415 COLL VENOUS BLD VENIPUNCTURE: CPT

## 2022-07-27 PROCEDURE — 36415 COLL VENOUS BLD VENIPUNCTURE: CPT | Performed by: INTERNAL MEDICINE

## 2022-07-27 PROCEDURE — 999N000157 HC STATISTIC RCP TIME EA 10 MIN

## 2022-07-27 PROCEDURE — 85018 HEMOGLOBIN: CPT

## 2022-07-27 PROCEDURE — 99254 IP/OBS CNSLTJ NEW/EST MOD 60: CPT | Mod: GC | Performed by: INTERNAL MEDICINE

## 2022-07-27 PROCEDURE — 82248 BILIRUBIN DIRECT: CPT | Performed by: STUDENT IN AN ORGANIZED HEALTH CARE EDUCATION/TRAINING PROGRAM

## 2022-07-27 PROCEDURE — 87040 BLOOD CULTURE FOR BACTERIA: CPT | Performed by: INTERNAL MEDICINE

## 2022-07-27 PROCEDURE — 999N000127 HC STATISTIC PERIPHERAL IV START W US GUIDANCE

## 2022-07-27 PROCEDURE — 999N000208 ECHOCARDIOGRAM COMPLETE

## 2022-07-27 PROCEDURE — 85027 COMPLETE CBC AUTOMATED: CPT | Performed by: INTERNAL MEDICINE

## 2022-07-27 RX ORDER — BISACODYL 5 MG
10 TABLET, DELAYED RELEASE (ENTERIC COATED) ORAL ONCE
Status: COMPLETED | OUTPATIENT
Start: 2022-07-28 | End: 2022-07-28

## 2022-07-27 RX ORDER — MAGNESIUM CARB/ALUMINUM HYDROX 105-160MG
296 TABLET,CHEWABLE ORAL ONCE
Status: DISCONTINUED | OUTPATIENT
Start: 2022-07-28 | End: 2022-07-27

## 2022-07-27 RX ORDER — HYDROCORTISONE ACETATE 25 MG/1
25 SUPPOSITORY RECTAL 2 TIMES DAILY PRN
Status: DISCONTINUED | OUTPATIENT
Start: 2022-07-27 | End: 2022-07-30

## 2022-07-27 RX ORDER — ACETAMINOPHEN 325 MG/1
650 TABLET ORAL EVERY 4 HOURS PRN
Status: DISCONTINUED | OUTPATIENT
Start: 2022-07-27 | End: 2022-08-03

## 2022-07-27 RX ADMIN — SODIUM CHLORIDE, POTASSIUM CHLORIDE, SODIUM LACTATE AND CALCIUM CHLORIDE: 600; 310; 30; 20 INJECTION, SOLUTION INTRAVENOUS at 04:32

## 2022-07-27 RX ADMIN — PREGABALIN 200 MG: 100 CAPSULE ORAL at 19:53

## 2022-07-27 RX ADMIN — PREGABALIN 200 MG: 100 CAPSULE ORAL at 08:07

## 2022-07-27 RX ADMIN — METHOCARBAMOL 1000 MG: 500 TABLET ORAL at 15:42

## 2022-07-27 RX ADMIN — POLYETHYLENE GLYCOL 3350, SODIUM SULFATE ANHYDROUS, SODIUM BICARBONATE, SODIUM CHLORIDE, POTASSIUM CHLORIDE 4000 ML: 236; 22.74; 6.74; 5.86; 2.97 POWDER, FOR SOLUTION ORAL at 21:14

## 2022-07-27 RX ADMIN — FOLIC ACID 1 MG: 1 TABLET ORAL at 08:08

## 2022-07-27 RX ADMIN — GLYCERIN, PETROLATUM, PHENYLEPHRINE HCL, PRAMOXINE HCL: 144; 2.5; 10; 15 CREAM TOPICAL at 20:07

## 2022-07-27 RX ADMIN — HYDROXYZINE HYDROCHLORIDE 25 MG: 25 TABLET, FILM COATED ORAL at 02:55

## 2022-07-27 RX ADMIN — HYDROXYZINE HYDROCHLORIDE 25 MG: 25 TABLET, FILM COATED ORAL at 08:22

## 2022-07-27 RX ADMIN — HYDROXYZINE HYDROCHLORIDE 25 MG: 25 TABLET, FILM COATED ORAL at 15:42

## 2022-07-27 RX ADMIN — SODIUM CHLORIDE, POTASSIUM CHLORIDE, SODIUM LACTATE AND CALCIUM CHLORIDE 1000 ML: 600; 310; 30; 20 INJECTION, SOLUTION INTRAVENOUS at 14:29

## 2022-07-27 RX ADMIN — ACETAMINOPHEN 650 MG: 325 TABLET, FILM COATED ORAL at 19:53

## 2022-07-27 RX ADMIN — METHOCARBAMOL 1000 MG: 500 TABLET ORAL at 08:07

## 2022-07-27 RX ADMIN — PANTOPRAZOLE SODIUM 40 MG: 40 INJECTION, POWDER, FOR SOLUTION INTRAVENOUS at 00:27

## 2022-07-27 RX ADMIN — Medication 1 TABLET: at 08:09

## 2022-07-27 RX ADMIN — ACETAMINOPHEN 650 MG: 325 TABLET, FILM COATED ORAL at 11:53

## 2022-07-27 RX ADMIN — PREGABALIN 200 MG: 100 CAPSULE ORAL at 13:53

## 2022-07-27 RX ADMIN — VENLAFAXINE HYDROCHLORIDE 225 MG: 150 CAPSULE, EXTENDED RELEASE ORAL at 08:07

## 2022-07-27 RX ADMIN — PANTOPRAZOLE SODIUM 40 MG: 40 INJECTION, POWDER, FOR SOLUTION INTRAVENOUS at 13:52

## 2022-07-27 RX ADMIN — ACETAMINOPHEN 650 MG: 325 TABLET, FILM COATED ORAL at 04:24

## 2022-07-27 RX ADMIN — GLYCERIN, PETROLATUM, PHENYLEPHRINE HCL, PRAMOXINE HCL: 144; 2.5; 10; 15 CREAM TOPICAL at 13:59

## 2022-07-27 RX ADMIN — SODIUM CHLORIDE, POTASSIUM CHLORIDE, SODIUM LACTATE AND CALCIUM CHLORIDE 1000 ML: 600; 310; 30; 20 INJECTION, SOLUTION INTRAVENOUS at 08:27

## 2022-07-27 RX ADMIN — ACETAMINOPHEN 650 MG: 325 TABLET, FILM COATED ORAL at 15:42

## 2022-07-27 ASSESSMENT — ACTIVITIES OF DAILY LIVING (ADL)
ADLS_ACUITY_SCORE: 48
ADLS_ACUITY_SCORE: 45
ADLS_ACUITY_SCORE: 48
ADLS_ACUITY_SCORE: 48

## 2022-07-27 NOTE — PLAN OF CARE
End of Shift Summary. See flowsheets for vital signs and detailed assessment.    Changes this shift: 2 L of LR given, BP responsive. BP running 90's/50's with MAP's 60-70. Maroon 1 aware, changed from ICU to med surgical to IMC status due to soft BP's. Stool viral panel sent.  Loose stools with blood noted, team aware.  Pt worked with OT today. Clear liquid diet until 2100. Lactic trending down.      Plan:  NPO at 2100 for Bowel Prep, Colonscopy and EGD scheduled for tomorrow.

## 2022-07-27 NOTE — PLAN OF CARE
Major Shift Events: Lactic acid trending down. Hgb trending up. A&Ox4, follows commands. PERRLA. C/o pain in head, hands, feet, +rectum - PRN Tylenol & Robaxin given, good effect; Pt has baseline neuropathy and broken R-foot. PRN Hydroxyzine given for Pt reported anxiety. Poor sleep quality overnight d/t frequent interruptions. Up with SBA, walker at bedside.    SR with rare PVCs. Following bedtime PO Olanzapine dose, pt reported feeling lightheaded, SBPs (70s-80s) + MAPs (50s-low 60s) softened - 1L LR bolus given per MICU, delayed BP response. Bedside ECHO completed with assessment for A-line, consent obtained from Pt. BPs became stable (SBP > 90, MAPs >65), held off on A-line. Pt became hypotensive again following PRN Hydroxyzine with SBPs in high 80s, MAPs in high 50s-low 60s, MICU aware.      Afebrile. On room air, has CPAP at bedside for sleep. BM x1 (small, loose, brown w/ red-streaks). NPO ex. meds since 1900 for GI consult and possible procedures in AM. Voided x3, adequate output.     Plan: Closely monitor BP and discuss with team plan to treat hypotension (Fluids vs initiate pressor). Pt to remain NPO for GI consult this AM with possible scope. Continue to trend Hgb + Lactic acid.  Continue to monitor and treat Pt per plan of care, notify MICU team of concerns.     For vital signs and complete assessments, please see documentation flowsheets.     Goal Outcome Evaluation:  Plan of Care Reviewed With: patient   Overall Patient Progress: no change

## 2022-07-27 NOTE — PROGRESS NOTES
CLINICAL NUTRITION SERVICES - BRIEF NOTE    Admission Nutrition Risk Screen positive for decreased appetite and 2-13 lb weight loss. PMH HTN, T2DM, alcohol use, prior pancreatitis, prior PEA c/b subsequent PEA arrest on Xarelto, and anxiety admitted on 7/26/2022 for hypotensive shock secondary to likely GI bleed. Per Chart Review, pt has had 3.5% wt loss x 2 months - not clinically significant. Noted nausea, emesis, dizziness over the past few weeks. Pt is currently on clear liquids only for planned colonoscopy and EGD tomorrow. Unable to visit with pt x 3 attempts, will monitor.     Wt Readings from Last 30 Encounters:   07/26/22 124.8 kg (275 lb 3.2 oz)   07/26/22 122.3 kg (269 lb 11.2 oz)   05/13/22 129.3 kg (285 lb)   03/10/22 130.2 kg (287 lb)   02/15/22 128.4 kg (283 lb)   01/12/22 131.5 kg (290 lb)   12/29/21 131.6 kg (290 lb 3.2 oz)   12/20/21 131.2 kg (289 lb 3.2 oz)   12/01/21 131.5 kg (290 lb)   11/24/21 131.5 kg (290 lb)   11/16/21 131.1 kg (289 lb)   11/04/21 131.5 kg (290 lb)   08/13/21 129.4 kg (285 lb 3.2 oz)   08/11/21 131.1 kg (289 lb)   08/04/21 131.1 kg (289 lb)   08/03/21 127.9 kg (282 lb)   07/13/21 127.9 kg (282 lb)   06/17/21 124.8 kg (275 lb 0.5 oz)   06/11/21 123.4 kg (272 lb 1.6 oz)   04/09/21 116 kg (255 lb 11.2 oz)   04/01/21 118.8 kg (262 lb)   01/14/21 114.8 kg (253 lb)   01/13/21 114.8 kg (253 lb)   12/14/20 111.1 kg (245 lb)   10/22/20 117.9 kg (260 lb)   10/15/20 122.1 kg (269 lb 1.6 oz)   09/30/20 107 kg (236 lb)   07/02/20 112 kg (247 lb)   06/19/20 113.7 kg (250 lb 9.6 oz)   06/08/20 113.7 kg (250 lb 9.6 oz)     RD will follow per LOS protocol or if consulted.     Bertha Duffy, MS, RD, LD, [facility] pager: [phone]  ASCOM: [id number]

## 2022-07-27 NOTE — PROGRESS NOTES
Resident/Fellow Attestation   I, Bertha Gutierrez MD, was present with the medical/FABIO student who participated in the service and in the documentation of the note.  I have verified the history and personally performed the physical exam and medical decision making.  I agree with the assessment and plan of care as documented in the note.      Agree with A/P as below, edited to reflect joint A/P.     Bertha Gutierrez MD  PGY3  Date of Service (when I saw the patient): 07/27/22    [facility]    Progress Note - Medicine Service, [facility] TEAM 1       Date of Admission:  7/26/2022    Assessment & Plan        Hilaria Bonner is a 31 year old female admitted on 7/26/2022. She has a history of HTN, T2DM, alcohol use, and prior PE c/b subsequent PEA arrest on Xarelto, fatty liver disease and anxiety who is admitted for hypotensive shock secondary to likely GI bleed.     #GI bleed  #Acute anemia 2/2 blood loss  #Hypovolemic shock 2/2 blood loss   P/w 3 weeks of hematochezia with associated lightheadedness and fatigue. Baseline hgb wnl, on arrival 6.4, improved to 7.8 after 2 U pRBC. Patient remains hypotensive, but has been been responsive to fluid resuscitation. Ddx includes UGIB vs LGIB, including diverticular bleed, PUD. CTA abd/pelvis negative for active extravasation. Hx fatty liver, no evidence cirrhosis/decompensation at this time.   -Hold PTA Xarelto, defer bridging with heparin gtt until colonoscopy/EGD complete, hgb stability   -HGB Q8h, transfuse if hgb<7  -Consulted GI   -CLD today    -Plan for endoscopy and colonoscopy tomorrow morning   -4L bowel prep at 9pm, 2L starting at 4 am with goal to be completed by 6am   -NPO at midnight  -continue to bolus LR prn for hypotension  -Enteric bacteria and virus panel by OLIVIER stool pending, f/u with results    #ARAMIS - improving  Pt presented with creatinine elevated to 2.18 on admission with baseline at 0.7-0.8. Likely due to  prerenal hypotension. Improving with pRBC and fluid resuscitation. Creatinine this morning was 2.   -2L of LR provided for fluid resuscitation  -Continue to monitor with AM BMP    #Lactic acidosis - improving   Bicarb improved from 19 to 16 with volume resuscitation. Suspect contribution of elevated lactate (6/7 on admit, now down to 2.2), hypotension (resolving) and anemia (transfused).   - trend BMP    Chronic Medical Problems  #Hx of PE c/b PEA arrest in 2018  -Hold PTA Xarelto suspected bleed   -ECHO showed normal global and regional left ventricular function with EF of 55-60%. No changes compared to previous study.    #Chronic peripheral neuropathy  -PTA Pregabalin     #Type 2 diabetes  A1c on admission 5.4. Glucose has been in 80s since arrival.   -Hold PTA metformin  -monitor on BMP daily, will increase frequency if needed    #Chronic anxiety  -Continue PTA Venlafaxine  -Hold Zyprexa due to low BP    #Obstructive Sleep Apnea  -CPAP at night    #Chronic hypertension  -Hold PTA Lisinopril, hydrochlorothiazide, and Prazosin    #Right foot fracture  Pt reports fracturing R foot about 3-4 weeks ago and seen by ortho at that time, given boot. Unfortunately cannot locate these records.   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 Appears pt has been following with neurology. Sx onset April 2020 4 weeks post-covid. NCS in 7/20 showed absent sensory responses in the upper and lower limbs and normal motor responses. Has been on maintenance IVIG q3 weeks (recently decreased to [id number]) since then and sx managed well with this per last neuro note 5/9/22.  - NTD       Diet: Clear Liquid Diet  NPO per Anesthesia Guidelines for Procedure/Surgery Except for: Meds    DVT Prophylaxis: VTE Prophylaxis contraindicated due to GI bleed  Gonzales Catheter: Not present  Fluids: LR  "fluid bolus  Central Lines: None  Cardiac Monitoring: ACTIVE order. Indication: ICU  Code Status: Full Code      The patient's care was discussed with the Attending Physician, Dr. Unger and Patient.    Alfa Reeves  Medical Student  Medicine Service, [facility] TEAM [address] [place] [address]  Securely message with the Vocera Web Console (learn more here)  Text page via [facility] Paging/Directory   Please see signed in provider for up to date coverage information      Clinically Significant Risk Factors Present on Admission             # Hypoalbuminemia: Albumin = 2.4 g/dL (Ref range: 3.5 - 5.2 g/dL) on admission, will monitor as appropriate   # Coagulation Defect: home medication list includes an anticoagulant medication   # Hypertension: home medication list includes antihypertensive(s)    # Severe Obesity: Estimated body mass index is 43.75 kg/m  as calculated from the following:    Height as of an earlier encounter on 7/26/22: 1.689 m (5' 6.5\").    Weight as of this encounter: 124.8 kg (275 lb 3.2 oz).        ______________________________________________________________________    Interval History   Patient does not feel lightheaded and has no abdominal pain, chest pain, or nausea. She had a bowel movement which was green and soft with brisk red blood. No blood on tissue after wiping.     Data reviewed today: I reviewed all medications, new labs and imaging results over the last 24 hours.  Physical Exam   Vital Signs: Temp: 98  F (36.7  C) Temp src: Oral BP: 95/57 Pulse: 115   Resp: 20 SpO2: 100 % O2 Device: None (Room air)    Weight: 275 lbs 3.2 oz  General Appearance: No acute distress  Respiratory: clear lung sounds, no increased work of breathing  Cardiovascular: regular rate and rhythm  GI: mild left upper quadrant tenderness on palpation  Other: Alert and awake     Data   Recent Labs   Lab 07/27/22  1219 07/27/22  1217 07/27/22  0815 07/27/22  0457 07/27/22  0015 " 07/26/22  2223 07/26/22  1910 07/26/22  1331 07/26/22  1320 07/26/22  1143 07/26/22  1143   WBC  --   --   --  12.8*  --  11.7*  --   --  12.4*  --  12.1*   HGB  --  7.8*  --  8.1*  --  7.9*  --  6.1* 6.4*  --  6.5*   MCV  --   --   --  99  --  103*  --   --  110*  --  113*   PLT  --   --   --  222  --  172  --   --  405  --  431   INR  --   --   --   --   --   --   --   --  1.95*  --   --    NA  --   --   --  138  --   --   --  139 137  --  137   POTASSIUM  --   --   --  4.8  --   --   --  3.8 4.2  --  4.3   CHLORIDE  --   --   --  105  --   --   --   --  101  --  103   CO2  --   --   --  17*  --   --   --   --  17*  --  18*   BUN  --   --   --  17.9  --   --   --   --  19.5  --  20   CR  --   --   --  2.00*  --   --   --   --  2.14*  --  2.18*   ANIONGAP  --   --   --  16*  --   --   --   --  19*  --  16*   QUINCY  --   --   --  7.6*  --   --   --   --  7.9*  --  7.9*   GLC 89  --  87 71   < >  --    < > 123* 132*   < > 161*   ALBUMIN  --   --   --   --   --   --   --   --  2.4*  --  1.7*   PROTTOTAL  --   --   --   --   --   --   --   --  5.9*  --  6.3*   BILITOTAL  --   --   --   --   --   --   --   --  2.6*  --  2.6*   ALKPHOS  --   --   --   --   --   --   --   --  283*  --  283*   ALT  --   --   --   --   --   --   --   --  123*  --  134*   AST  --   --   --   --   --   --   --   --   --   --  221*    < > = values in this interval not displayed.     Recent Results (from the past 24 hour(s))   CTA Abdomen Pelvis with Contrast    Narrative    CTA ABDOMEN AND PELVIS 7/26/2022 3:45 PM    CLINICAL HISTORY: GI bleed with hypotension.     COMPARISONS: CT 11/5/2020. Thoracic spine MR 9/29/2021.    REFERRING PROVIDER: EVARISTO ZAMAN    TECHNIQUE: Unenhanced CT performed through the abdomen and pelvis.  Contrast administered and patient reported discomfort. Contrast  stopped after 31.8 mL administered. No infiltration or extravasation  was noted by the technologist. Other arm was used and contrast was  administered without  complaint. Following the uneventful  administration of intravenous contrast, somewhat delayed CTA performed  through the abdomen and pelvis. After an 80 second delay, CT repeated  through the abdomen and pelvis. Coronal and sagittal reconstructions  produced. Coronal MIP reconstructions produced.    3D and multiplanar reconstructions were unavailable at the time of  dictation.    CONTRAST: 131.8 mL Isovue 370    DOSE (DLP): 2106 mGy*cm    FINDINGS: CTA: No active extravasation demonstrated.    Aorta patent without stenosis, dissection, or aneurysm. Celiac,  superior mesenteric, bilateral single renal, and inferior mesenteric  arteries patent. Bilateral common, internal, and external iliac  arteries patent. Bilateral common femoral arteries patent.    Bilateral common femoral veins patent. Bilateral common, internal, and  external iliac veins patent. Inferior vena cava patent. Renal and  hepatic veins patent.    Splenic, superior mesenteric, and portal veins patent.    CT: Lung bases clear.    Hepatosteatosis.    Sleeve gastrectomy postoperative changes. Bowel decompressed.    Gallbladder sludge suggested. No pericholecystic fluid.    T9 compression fracture, similar to previous.      Impression    IMPRESSION: No active extravasation demonstrated.    JACOB LOMAX MD         SYSTEM ID:  [id number]   Echo Complete   Result Value    LVEF  55-60%    Narrative    [id number]  [id number]  [id number]  [id number]^DUARTE^GRACIELA     [facility]  Echocardiography Laboratory  [address] [place] [place] [id number]     Name: RADHA JOHANSEN  MRN: [id number]  : 1990  Study Date: 2022 07:54 AM  Age: 31 yrs  Gender: Female  Patient Location: [facility]  Reason For Study: Shock  Ordering Physician: GRACIELA RAMACHANDRAN  Performed By: Rebekah Mayer RDCS     BSA: 2.3 m2  Height: 66 in  Weight: 275 lb  HR: 91  BP: 88/62  mmHg  ______________________________________________________________________________  Procedure  Complete Portable Echo Adult. Technically difficult study.  ______________________________________________________________________________  Interpretation Summary  Global and regional left ventricular function is normal with an EF of 55-60%.  Right ventricular function, chamber size, wall motion, and thickness are  normal.  Pulmonary artery systolic pressure cannot be assessed.  The inferior vena cava cannot be assessed.  No pericardial effusion is present.  There has been no change.  ______________________________________________________________________________  Left Ventricle  Global and regional left ventricular function is normal with an EF of 55-60%.  Left ventricular wall thickness is normal. Left ventricular size is normal.  Left ventricular diastolic function is normal. No regional wall motion  abnormalities are seen.     Right Ventricle  Right ventricular function, chamber size, wall motion, and thickness are  normal.     Atria  Both atria appear normal. The atrial septum is intact as assessed by color  Doppler .     Mitral Valve  The mitral valve is normal.     Aortic Valve  The valve leaflets are not well visualized. On Doppler interrogation, there is  no significant stenosis or regurgitation.     Tricuspid Valve  The tricuspid valve is normal. Pulmonary artery systolic pressure cannot be  assessed.     Pulmonic Valve  The valve leaflets are not well visualized. On Doppler interrogation, there is  no significant stenosis or regurgitation.     Vessels  The thoracic aorta is normal. The pulmonary artery and bifurcation cannot be  assessed. The inferior vena cava cannot be assessed.     Pericardium  No pericardial effusion is present.     Compared to Previous Study  There has been no change.  ______________________________________________________________________________  MMode/2D Measurements & Calculations      RVDd: 3.8 cm  IVSd: 0.85 cm  LVIDd: 4.1 cm  LVIDs: 3.0 cm  LVPWd: 0.99 cm  FS: 27.3 %  LV mass(C)d: 119.2 grams  LV mass(C)dI: 52.0 grams/m2  asc Aorta Diam: 2.8 cm  LVOT diam: 2.2 cm  LVOT area: 3.9 cm2  LA Volume Index (BP): 26.0 ml/m2  RWT: 0.48  TAPSE: 2.9 cm     Doppler Measurements & Calculations  MV E max jazmyne: 100.3 cm/sec  MV A max jazmyne: 67.6 cm/sec  MV E/A: 1.5  MV dec slope: 597.7 cm/sec2  MV dec time: 0.17 sec  E/E' av.3  Lateral E/e': 6.5  Medial E/e': 8.1     ______________________________________________________________________________  Report approved by: Zaid Shipman 2022 08:35 AM           Medications       [START ON 2022] bisacodyl  10 mg Oral Once     folic acid  1 mg Oral Daily     folic acid-vit B6-vit B12  1 tablet Oral Daily     insulin aspart  1-6 Units Subcutaneous Q4H     lactated ringers  1,000 mL Intravenous Once     lactated ringers  1,000 mL Intravenous Once     pantoprazole (PROTONIX) IV  40 mg Intravenous Q12H     [START ON 2022] polyethylene glycol  2,000 mL Oral Once     polyethylene glycol  4,000 mL Oral Once     [Held by provider] potassium chloride ER  20 mEq Oral Daily     pramox-pe-glycerin-petrolatum   Rectal TID     Pregabalin  200 mg Oral TID     venlafaxine  225 mg Oral Daily with breakfast

## 2022-07-27 NOTE — PROGRESS NOTES
07/27/22 1342   Quick Adds   Type of Visit Initial Occupational Therapy Evaluation   Living Environment   People in Home child(lavell), dependent   Current Living Arrangements apartment   Home Accessibility stairs to enter home   Number of Stairs, Main Entrance 8   Stair Railings, Main Entrance railings safe and in good condition   Transportation Anticipated family or friend will provide;car, drives self   Living Environment Comments Pt lives with dependent children in an apartment, ~8 steps up w/ hand rail present, no elevator access. Pt has a tub shower, shower chair, no grab bars.   Self-Care   Usual Activity Tolerance fair   Current Activity Tolerance fair   Regular Exercise No   Equipment Currently Used at Home walker, standard;shower chair;orthosis   Fall history within last six months yes   Number of times patient has fallen within last six months   (3-10 per pt)   Activity/Exercise/Self-Care Comment Pt reports receiving assist at baseline w/ ADLs. Has PCA assist 1.75 hrs/day, reports family also assist frequently w/ heavy ADL/IADL. Owns a FWW, utilizes occasionally.   Instrumental Activities of Daily Living (IADL)   IADL Comments Family/PCA assist w/ IADLs   General Information   Onset of Illness/Injury or Date of Surgery 07/26/22   Referring Physician Luan Bey MD   Patient/Family Therapy Goal Statement (OT) Regain strength/IND   Additional Occupational Profile Info/Pertinent History of Current Problem Marti Bonner is a 31 year old woman with pertinent past medical history of hypertension, Type II DM, alcohol use, prior pancreatitis, prior PEA complicated by subsequent PEA arrest on Xarelto, and anxiety admitted on 07/26/2022 for hypotensive shock secondary to probable GI bleed. Has been responding to fluid and blood, now s/p 3 liters LR and 2 U pRBC. Lactate trending down, most recently 2.2. Transferring to medicine with plan to have EGD/Colonoscopy with GI 07/28.   Existing  Precautions/Restrictions fall;weight bearing   Limitations/Impairments safety/cognitive   Right Lower Extremity (Weight-bearing Status) other (see comments)  (Unclear, paged for updated orders)   Cognitive Status Examination   Orientation Status orientation to person, place and time   Follows Commands follows two-step commands;50-74% accuracy   Attention Deficit moderate deficit;focused/sustained attention;requires cues/redirection to task   Cognitive Status Comments Pt reports feeling below cognitive baseline, reporting deficits in memory, attention, and word finding. Deficits noted during session, will continue to monitor   Visual Perception   Visual Impairment/Limitations WFL   Sensory   Sensory Comments Neuropathy in BLE/BUE   Pain Assessment   Patient Currently in Pain Yes, see Vital Sign flowsheet   Posture   Posture protracted shoulders;forward head position   Range of Motion Comprehensive   General Range of Motion bilateral upper extremity ROM WFL   Strength Comprehensive (MMT)   Comment, General Manual Muscle Testing (MMT) Assessment Defer formal MMT, BUE appear at least 3+/5 MMT   Coordination   Upper Extremity Coordination Left UE impaired;Right UE impaired   Functional Limitations Fine motor ADL performance impaired;Impaired ability to perform bilateral tasks;Object transport impaired   Coordination Comments Deficits in FMC 2/2 neuropathy   Bed Mobility   Bed Mobility supine-sit;sit-supine   Supine-Sit [place] (Bed Mobility) supervision   Sit-Supine [place] (Bed Mobility) contact guard   Assistive Device (Bed Mobility) other (see comments)  (HOB elevated)   Transfers   Transfers sit-stand transfer;bed-chair transfer   Transfer Skill: Bed to Chair/Chair to Bed   Bed-Chair [place] (Transfers) minimum assist (75% patient effort)   Assistive Device (Bed-Chair Transfers) standard walker   Sit-Stand Transfer   Sit-Stand [place] (Transfers) minimum assist (75% patient effort);moderate assist  (50% patient effort)   Assistive Device (Sit-Stand Transfers) walker, standard   Activities of Daily Living   BADL Assessment/Intervention lower body dressing;toileting;grooming;bathing   Bathing Assessment/Intervention   Comment, (Bathing) Anticipate mod A per clinical judgement   Lower Body Dressing Assessment/Training   Position (Lower Body Dressing) unsupported sitting   [place] (Lower Body Dressing) moderate assist (50% patient effort)   Grooming Assessment/Training   Comment, (Grooming) Anticipate min A w/ setup per clinical judgment, seated   Toileting   Comment, (Toileting) Anticipate min A per clinical judgment   Clinical Impression   Criteria for Skilled Therapeutic Interventions Met (OT) Yes, treatment indicated   OT Diagnosis Decreased ADL/IADL I   OT Problem List-Impairments impacting ADL problems related to;activity tolerance impaired;cognition;strength;pain   Assessment of Occupational Performance 5 or more Performance Deficits   Identified Performance Deficits Dressing, bathing, toileting, g/h, functional mobility/transfers   Planned Therapy Interventions (OT) ADL retraining;IADL retraining;strengthening;transfer training;home program guidelines;progressive activity/exercise;risk factor education   Clinical Decision Making Complexity (OT) moderate complexity   Anticipated Equipment Needs Upon Discharge (OT) other (see comments)  (TBD)   Risk & Benefits of therapy have been explained evaluation/treatment results reviewed;care plan/treatment goals reviewed;risks/benefits reviewed;current/potential barriers reviewed;participants voiced agreement with care plan;participants included;patient   Clinical Impression Comments Pt will benefit from skilled OT services to progress IND w/ ADLs/IADLs and support safe discharge plan   OT Discharge Planning   OT Discharge Recommendation (DC Rec) Transitional Care Facility;home with assist;home with home care occupational therapy   OT Rationale for DC Rec  Pt presents below baseline, limited by weakness/deconditioning and symptomatic hypotension. If pt were to discharge today, would recommend TCU to progress IND w/ ADLs/IADLs. Anticipate pt may progress once symptoms managed, will continue to monitor and update recs as indicated.   OT Brief overview of current status Ax1 w/ CAM boot and FWW   Total Evaluation Time (Minutes)   Total Evaluation Time (Minutes) 10   OT Goals   Therapy Frequency (OT) 5 times/wk   OT Goals Lower Body Dressing;Lower Body Bathing;Toilet Transfer/Toileting;Hygiene/Grooming   OT: Hygiene/Grooming supervision/stand-by assist;while standing   OT: Lower Body Dressing Minimal assist;within precautions   OT: Lower Body Bathing Minimal assist;using adaptive equipment   OT: Toilet Transfer/Toileting Supervision/stand-by assist;toilet transfer;cleaning and garment management;using adaptive equipment

## 2022-07-27 NOTE — PROGRESS NOTES
Admitted/transferred from: ED @ 1900  Reason for admission/transfer: Elevated lactic/low hemoglobin  Patient status upon admission/transfer: Vitally stable, alert, on RA  Interventions: 2nd unit of RBCs infusing. LR @ 100.   Plan: Needs UA, blood cultures, recheck labs.   2 RN skin assessment: completed by Ai GALVAN & Albina GIL  Result of skin assessment and interventions/actions: Moisture damage in pannus. Broken right foot w/ bruising  Height, weight, drug calc weight:Done  Patient belongings (see Flowsheet - Adult Profile for details): $82 in cash (in glasses case inside purse), purse w/ excedrin, snacks, etc. In it. Glasses. Phone.   MDRO education (if applicable): N/A

## 2022-07-27 NOTE — PROGRESS NOTES
[facility]  CRITICAL CARE STAFF NOTE  07/27/22      Brief patient summary:   31y F with PMHx of obesity s/p bariatric surgery, alcohol use disorder, DM, history of PE with PEA arrest on chronic Xarelto who presents with concerns of shock in the setting of acute anemia and concerns of rectal bleeding    PLAN:  - Continues with volume resuscitation; Hgb responding to transfusion  - ARAMIS in setting of hypotension/pre-renal; responding to volume resusc  - Lactic acidosis resolving  - GI consult to discuss timing of endoscopy  - Pending echocardiogram given o/n concerns for possible RV dysfunction    Rest per resident/fellow/FABIO note from today.    The patient was seen and examined with the resident/fellow physician or FABIO.  We have discussed the patient in detail and I agree with the findings, assessment, and plan as documented when this note was cosigned on this day. The plan was formulated in conjunction with pharmacy, ICU nurses, and respiratory therapist. I have evaluated all laboratory values and imaging studies for the past 24 hours. I have reviewed all the consults that have been ordered and are active for this patient.      Thomas M. Leventhal, M.D.   of Medicine  [facility]  Advanced/Transplant Hepatology & Critical Care Medicine      Total Visit Time: 35 min    Total Face-to-Face Prolonged Service Time: 35 min    Content of the Prolonged Time: additional E&M plan

## 2022-07-27 NOTE — H&P
MEDICAL ICU H&P  07/27/2022    Date of Hospital Admission: 7/26/22  Date of ICU Admission: 7/26/22  Reason for Critical Care Admission: Hypotensive shock c/f hypovolemia 2/2 GIB  Date of Service (when I saw the patient): 07/27/2022    ASSESSMENT:   Hilaria Bonner is a 31 year old female w/ pertinent PMHx of HTN, T2DM, alcohol use, prior pancreatitis, prior PEA c/b subsequent PEA arrest on Xarelto, and anxiety admitted on 7/26/2022 for hypotensive shock secondary to likely GI bleed. Overall fluid-responsive thus far w/ improvement of MAP's and Hgb has responded to 2u pRBC's given prior to admission.    PLAN:     Neuro:  Syncopal episode, persistent presyncope  Orthostatic-like symptoms  Symptoms present x2-3 weeks, dizziness/light-headedness/nausea w/ position changes including one full episode of syncope two weeks ago. Symptoms improve w/ rest. Perhaps hypovolemia / symptomatic anemia in this context.  - Resuscitation measures as per below    Chronic anxiety  - Will continue PTA Venlafaxine  - BP got a bit more soft after nightly PTA Zyprexa, will hold for now    Pulmonary:  Prior PE c/b PEA arrest (2018)  On Xarelto. No increased WOB, on room air. Last echo Dec 2020 w/o concerns for RV dysfunction. POCUS this admission difficult to obtain good windows secondary to body habitus, though RV does appear somewhat enlarged. Would want to further evaluate RV function in light of ongoing borderline hypotension.  - Holding PTA Xarelto for now in light of suspected bleed  - TTE ordered for the am  - Will consider art line access for better BP monitoring, Flotrak to assess for response to fluid resuscitation vs need for pressor support    KENDALL - nightly CPAP    Cardiovascular:  Hypotensive shock  MAP's in 50's initially, responded well to initial resuscitation w/ pRBC's and 1L isotonic fluids though persistently borderline low in mid to low 60's even after a 2nd L of isotonic fluids. Lactic downtrending 7's range to 6's  range. Perhaps hemorrhagic/hypovolemic in light of possible GI bleed, low suspicion for distributive/septic etiology. Last echo in 2020 as above w/o evidence of RV dysfunction; difficult to assess cardiac windows w/ POCUS on admission hwoever, in light of significant thromboembolic event in 2018, will want to reassess cardiac function ideally prior to more aggressive volume resuscitation vs pressor support.   - Trending lactic (difficult draw)  - TTE ordered for the am as per above  - Considering establishing art line access, Flotrak monitoring  - Will further decide fluid resuscitation vs pressors if pressures persistently MAP<65    Chronic hypertension  Holding PTA Lisinopril, hydrochlorothiazide, and Prazosin    GI/Nutrition:  Hematochezia  Intermittent episodes for the past 2-3 weeks, visualized in-hospital. Known history of hemorrhoids. History of EtOH use but no known cirrhosis, no history of variceal bleeding, no hematemesis. Relatively responding to resuscitative measures as above. Hgb from 6.5 range to 7.9 w/ 2u pRBC's. Has not had an additional blood BM since admission. Ddx remains broad for likely lower vs upper GI bleed.   - NPO  - IV PPI BID  - Trending Hgb q8h (difficult draw)  - GI consult in the am for consideration of colonoscopy +/- endoscopy    Renal/Fluids/Electrolytes:  Acute kidney injury  Creatinien on admission 2.18 from prior baseline of 0.7-0.8 range. Likely prerenal in setting of blood loss and hypotension.  - Monitoring UOP, daily weights  - Resuscitation measures as above  - Will retrend in the am    AGMA  Likely secondary to lactic acidosis in the setting of elevated lactate, hypotension, anemia. Suspect will improve as lactate comes down w/ resuscitation measures.  - Trending lactate as above    Endocrine:  Chronic T2DM  A1c on admission 5.4, relatively well-controlled.   - Holding PTA Metformin  - LDSSI q4h for now     ID:  No active concerns    Hematology:    Acute blood loss  "anemia  Hgb on admission 6.5, prior baseline 12-13 range. Macrocytic. Not previously known to be iron deficient. In context of hematochezia as above.  - Trending Hgb q8h for now  - Hgb goals >7    Musculoskeletal:  Chronic peripheral neuropathy  PTA Pregabalin     Skin:  No active concerns     General Cares/Prophylaxis:    DVT Prophylaxis: Pneumatic Compression Devices  GI Prophylaxis: PPI  Family Communication: MotherCoco - [phone]  Code Status: Full, verified at time of admission    Lines/tubes/drains:  - PIV    Disposition: Medical ICU    Patient seen and findings/plan discussed with medical ICU staff, Dr. Moore.    Ricardo Glynn MD  Internal Medicine, PGY-2  MICU Nights (See provider summary for day team assignment in the morning)    HPI:   Hilaria Bonner is a 31 year old female w/ pertinent PMHx of HTN, T2DM, alcohol use, prior pancreatitis, prior PEA c/b subsequent PEA arrest on Xarelto, and anxiety who presents w/ 2-3 weeks of dizziness, light-headedness, and syncope.    Reports being in her usual state of health up until 2-3 weeks ago when she had a gradual onset of episodic dizziness, lighth-eadedness. Particularly brought about by position changes and exacerbated by quick movements such as sitting to standing. At least one syncopal episode ~2 weeks ago. Associated nausea w/ \"a few\" episodes of clear vomiting. Usually symptoms improve w/ rest for several moments. Generally progressive generalized weakness. Presented to clinic today for these symptoms, directed to the ED.    In the ED, found to be hypotensive in 50's MAP's w/ a Hgb of 6.5. Given 1L isotonic fluids and 2u pRBC's. Started on BID PPI.    ROS:   (All of the below are negative except as indicated in bold)  Constitutional: Fever, chills, dizziness, light-headedness, syncope, headache  Eyes: Pain, Redness  ENT: Rhinorrhea, congestion, sore throat, neck pain  CV: Chest pain, palpitations  Resp: Shortness of breath, cough, sputum " production  Abd: Abdominal Pain, nausea, vomiting, diarrhea, constipation, black/bloody stools  : Hematuria, dysuria, frequency, urgency  MSK: Back pain, arthralgias, myalgias  Neuro: Speech changes, vision changes, focal extremity numbness, focal extremity weakness  Skin: Rashes, lesions    PAST MEDICAL HISTORY:     PAST MEDICAL HISTORY:   Past Medical History:   Diagnosis Date     Acute kidney injury (H) 2019     Acute massive pulmonary embolism (H) 2019     Acute pancreatitis 2018     Acute pancreatitis 2021    due to ETOH     Acute thoracic back pain 2021     ARAMIS (acute kidney injury) (H) 2019     Cardiac arrest, cause unspecified (H) 2019    massive pulmonary embolism with pulseless electrical activity cardiac arrest in May 2019 following gastric bypass in 2019      Depression with anxiety      GERD (gastroesophageal reflux disease)      History of alcohol use disorder      HTN (hypertension)      Infection due to 2019 novel coronavirus 2020    COVID19 infection in 2020     Ischemic colitis (H) 2019     Morbid obesity (H)      Scalp laceration, initial encounter 2020     SURGICAL HISTORY:  Past Surgical History:   Procedure Laterality Date      SECTION       LAPAROSCOPIC GASTRIC SLEEVE N/A 2019    Procedure: Laparoscopic Sleeve Gastrectomy;  Surgeon: Luan Lopez MD;  Location: [facility] OR     ORTHOPEDIC SURGERY      2 knee meniscus surgery     SOCIAL HISTORY:  Social History     Socioeconomic History     Marital status: Single     Spouse name: None     Number of children: 2     Years of education: None     Highest education level: None   Tobacco Use     Smoking status: Current Every Day Smoker     Packs/day: 0.25     Years: 1.00     Pack years: 0.25     Types: Cigarettes     Start date: 2016     Smokeless tobacco: Never Used   Vaping Use     Vaping Use: Never used   Substance and Sexual Activity     Alcohol use: Not  Currently     Comment: Quit drinking when last hospitalized     Drug use: Not Currently     Types: Marijuana     Sexual activity: Yes     Partners: Male     Birth control/protection: Injection     Social Determinants of Health     Financial Resource Strain: Low Risk      Difficulty of Paying Living Expenses: Not very hard   Food Insecurity: Food Insecurity Present     Worried About Running Out of Food in the Last Year: Sometimes true     Ran Out of Food in the Last Year: Sometimes true   Transportation Needs: No Transportation Needs     Lack of Transportation (Medical): No     Lack of Transportation (Non-Medical): No     FAMILY HISTORY:   Family History   Problem Relation Age of Onset     Pulmonary Embolism Mother      Diabetes Father      Hypertension Father      Breast Cancer Sister 26        surgery only     Cancer Sister      Breast Cancer Sister      Mental Illness Sister         Bipolar     Coronary Artery Disease Other         paternal aunt     Thyroid Disease Other         neice     Coronary Artery Disease Other      Cerebrovascular Disease Other      Thyroid Disease Other      Liver Disease No family hx of      Colon Cancer No family hx of      ALLERGIES:   No Known Allergies  MEDICATIONS:  No current facility-administered medications on file prior to encounter.  alcohol swab prep pads, Use to swab area of injection/luke as directed.  ammonium lactate (AMLACTIN) 12 % external cream, Apply topically 2 times daily  Biotin 5000 MCG TABS, Take 1 tablet by mouth daily  blood glucose (NO BRAND SPECIFIED) test strip, Use to test blood sugar 1 times daily or as directed. To accompany: Blood Glucose Monitor Brands: per insurance.  blood glucose calibration (NO BRAND SPECIFIED) solution, To accompany: Blood Glucose Monitor Brands: per insurance.  Blood Glucose Monitoring Suppl (ACCU-CHEK GUIDE) w/Device KIT, USE TO TEST BLOOD SUGAR DAILY  calcium Citrate-vitamin D 500-400 MG-UNIT CHEW, Take 1 chew tab by mouth 3  times daily  diphenhydrAMINE (BENADRYL) 50 MG capsule, Take 50 mg by mouth every 6 hours as needed for allergies or other (prior to infusion)  EPINEPHrine (ANY BX GENERIC EQUIV) 0.3 MG/0.3ML injection 2-pack,   erythromycin (ROMYCIN) 5 MG/GM ophthalmic ointment, Place 0.5 inches into both eyes 2 times daily  hydrochlorothiazide (HYDRODIURIL) 12.5 MG tablet,   hydrOXYzine (ATARAX) 25 MG tablet, Take 1 tablet (25 mg) by mouth every 6 hours as needed for anxiety  lisinopril (ZESTRIL) 10 MG tablet, TAKE 1 TABLET(10 MG) BY MOUTH DAILY  medroxyPROGESTERone (DEPO-PROVERA) 150 MG/ML IM injection, Inject 150 mg into the muscle every 3 months  metFORMIN (GLUCOPHAGE XR) 500 MG 24 hr tablet, TAKE 1 TABLET(500 MG) BY MOUTH TWICE DAILY WITH MEALS  methocarbamol (ROBAXIN) 500 MG tablet, Take 1-2 tablets (500-1,000 mg) by mouth 3 times daily as needed for muscle spasms  Multiple Vitamins-Minerals (MULTIVITAMIN ADULT) CHEW, Take 1 chew tab by mouth 2 times daily  OLANZapine (ZYPREXA) 10 MG tablet, Take 1 tablet (10 mg) by mouth At Bedtime  omeprazole (PRILOSEC) 20 MG DR capsule, Take 1 capsule (20 mg) by mouth 2 times daily  ondansetron (ZOFRAN-ODT) 4 MG ODT tab, DISSOLVE 1 TABLET(4 MG) ON THE TONGUE EVERY 8 HOURS AS NEEDED FOR NAUSEA  polyethylene glycol (MIRALAX) 17 GM/SCOOP powder, Take 17 g (1 capful) by mouth daily  potassium chloride ER (K-TAB/KLOR-CON) 10 MEQ CR tablet, TAKE 2 TABLETS(20 MEQ) BY MOUTH TWICE DAILY  potassium chloride ER (KLOR-CON M) 20 MEQ CR tablet, Take 20 mEq by mouth  prazosin (MINIPRESS) 1 MG capsule, TAKE 2 CAPSULES(2 MG) BY MOUTH AT BEDTIME  Pregabalin (LYRICA) 200 MG capsule, Take 1 capsule (200 mg) by mouth 3 times daily  PRIVIGEN 20 GM/200ML SOLN,   PRIVIGEN 40 GM/400ML SOLN,   SODIUM CHLORIDE FLUSH 0.9 % flush,   SOLU-CORTEF 100 MG injection,   thin (NO BRAND SPECIFIED) lancets, Use with lanceting device. To accompany: Blood Glucose Monitor Brands: per insurance.  venlafaxine (EFFEXOR XR) 75 MG 24  hr capsule, TAKE 3 CAPSULES(225 MG) BY MOUTH DAILY  vitamin B-Complex, Take 1 tablet by mouth daily  vitamin C (ASCORBIC ACID) 1000 MG TABS, Take 1 tablet (1,000 mg) by mouth daily  vitamin D3 (CHOLECALCIFEROL) 50 mcg (2000 units) tablet, Take 1 tablet (50 mcg) by mouth daily  XARELTO ANTICOAGULANT 20 MG TABS tablet, TAKE 1 TABLET(20 MG) BY MOUTH DAILY WITH DINNER  folic acid (FOLVITE) 1 MG tablet, Take 1 tablet (1 mg) by mouth daily        PHYSICAL EXAMINATION:   Temp:  [97  F (36.1  C)-98.5  F (36.9  C)] 98.4  F (36.9  C)  Pulse:  [] 93  Resp:  [8-24] 20  BP: ()/(28-85) 95/44  FiO2 (%):  [21 %] 21 %  SpO2:  [92 %-100 %] 99 %  General: Awake, alert, no acute distress, pleasantly conversational  HEENT: No conjunctival erythema, no scleral icterus, PERRL b/l, EOMI, oral mucosa w/o ulcerations or lesions  Neck: Difficult to assess for JVD given positioning and body habitus  CV: RRR, no audible murmurs  Lungs: On room air, CTA b/l, no wheezes or crackles though lung sounds distant, no increased WOB  Abd: Soft, non-tender, non-distended obese abdomen, bowel sounds present  Ext: 2+ radial pulses b/l, no appreciable dependent edema  Neuro: AOx4, strength/sensation grossly intact b/l upper and lower extremities    LABS:   Arterial Blood Gases   Recent Labs   Lab 07/26/22  1722 07/26/22  1323   PH 7.37 7.33     Complete Blood Count   Recent Labs   Lab 07/26/22  2223 07/26/22  1331 07/26/22  1320 07/26/22  1143   WBC 11.7*  --  12.4* 12.1*   HGB 7.9* 6.1* 6.4* 6.5*     --  405 431     Basic Metabolic Panel  Recent Labs   Lab 07/27/22  0015 07/26/22  1910 07/26/22  1331 07/26/22  1320 07/26/22  1143   NA  --   --  139 137 137   POTASSIUM  --   --  3.8 4.2 4.3   CHLORIDE  --   --   --  101 103   CO2  --   --   --  17* 18*   BUN  --   --   --  19.5 20   CR  --   --   --  2.14* 2.18*   GLC 72 80 123* 132* 161*     Liver Function Tests  Recent Labs   Lab 07/26/22  1320 07/26/22  1143   AST  --  221*   *  134*   ALKPHOS 283* 283*   BILITOTAL 2.6* 2.6*   ALBUMIN 2.4* 1.7*   INR 1.95*  --      Coagulation Profile  Recent Labs   Lab 07/26/22  1320   INR 1.95*       IMAGING:     Recent Results (from the past 24 hour(s))   CTA Abdomen Pelvis with Contrast    Narrative    CTA ABDOMEN AND PELVIS 7/26/2022 3:45 PM    CLINICAL HISTORY: GI bleed with hypotension.     COMPARISONS: CT 11/5/2020. Thoracic spine MR 9/29/2021.    REFERRING PROVIDER: EVARISTO ZAMAN    TECHNIQUE: Unenhanced CT performed through the abdomen and pelvis.  Contrast administered and patient reported discomfort. Contrast  stopped after 31.8 mL administered. No infiltration or extravasation  was noted by the technologist. Other arm was used and contrast was  administered without complaint. Following the uneventful  administration of intravenous contrast, somewhat delayed CTA performed  through the abdomen and pelvis. After an 80 second delay, CT repeated  through the abdomen and pelvis. Coronal and sagittal reconstructions  produced. Coronal MIP reconstructions produced.    3D and multiplanar reconstructions were unavailable at the time of  dictation.    CONTRAST: 131.8 mL Isovue 370    DOSE (DLP): 2106 mGy*cm    FINDINGS: CTA: No active extravasation demonstrated.    Aorta patent without stenosis, dissection, or aneurysm. Celiac,  superior mesenteric, bilateral single renal, and inferior mesenteric  arteries patent. Bilateral common, internal, and external iliac  arteries patent. Bilateral common femoral arteries patent.    Bilateral common femoral veins patent. Bilateral common, internal, and  external iliac veins patent. Inferior vena cava patent. Renal and  hepatic veins patent.    Splenic, superior mesenteric, and portal veins patent.    CT: Lung bases clear.    Hepatosteatosis.    Sleeve gastrectomy postoperative changes. Bowel decompressed.    Gallbladder sludge suggested. No pericholecystic fluid.    T9 compression fracture, similar to previous.       Impression    IMPRESSION: No active extravasation demonstrated.    JACOB LOMAX MD         SYSTEM ID:  [id number]

## 2022-07-27 NOTE — CONSULTS
GASTROENTEROLOGY CONSULTATION      Luminal consult    Consult requested by Dr. Leventhal, Thomas    Reason for consult: Concern for GI bleed    History is obtained from the patient    Date of Admission:  7/26/2022         History of Present Illness:   Hilaria Bonner is a 31 year old female with a history of alcohol use disorder, sleeve gastrectomy, PE c/b PEA arrest in 2018, now on rivaroxaban.  GI consulted for BRBPR.    She reports 2 weeks of dizziness, maroon stools, and vomiting.  She was being seen in clinic and was found to have a hemoglobin of 6.4, and thus was transferred to the ED.     Has received 2 units PRBC with hemoglobin from 6.4 to 7.9.  Lactate was 7.3 --> 6.5.  Cr is 2.14.  She last took her Rivaroxaban yesterday.  Never had bleeding like this before.  Denies NSAIDs or recent ETOH.  No prior colonoscopy or EGD.    ERICKSON in ED showed brown stool with red blood per report.     Currently, asymptomatic.  She is hungry.  HR is 102-110.         Social History:   Prior alcohol use         Family History:   Denies relevant family history of gastrointestinal disease          Past Medical, Surgical and Procedural History (Summarized):   Pertinent Medical History:  Obesity  KENDALL  Type 2 DM  HTN  Alcohol use disorder  PE complicated by PEA arrest on Rivaroxaban in 2018    Abdominal and Pelvis Surgeries:  Laparoscopic gastric sleeve 2019         Medications:   Medication list reviewed, notable for:    Olanzapine  Omeprazole 20 mg po BID  Miralax  Pregabalin  Xarelto         Previous Endoscopy:   Recent Endoscopic History:    No EGD or colonoscopy on file         Review of Systems:   A complete 10 point review of systems was obtained.   Positive for dizziness, fatigue, rectal bleeding.     Please see the HPI for pertinent positives and negatives.  All other systems were reviewed and were found to be negative.          Physical Exam:   BP (!) 84/57   Pulse 96   Temp 97.9  F (36.6  C) (Oral)   Resp 16   Wt  124.8 kg (275 lb 3.2 oz)   SpO2 100%   BMI 43.75 kg/m    Wt:   Wt Readings from Last 2 Encounters:   07/26/22 124.8 kg (275 lb 3.2 oz)   07/26/22 122.3 kg (269 lb 11.2 oz)      Constitutional: obese, mildly diaphoretic, appears mildly uncomfortable  HEENT: No conjunctival icterus, moist mucus membranes.  CV: Tachycardic No edema.  Respiratory: Unlabored breathing  Abdomen:   Soft, non-tender, non-distended  ERICKSON: No external lesions or tears, brown liquid stool in the rectal vault  Skin: No jaundice, warm  Neuro: Alert, moves all extremities, no asterixis.  Psych: Normal affect, answers questions appropriately.          Data (Summarized):   Notable labs:  Hemoglobin 6.5 in the ED, low of 6.1, currently 7.9.  Last prior was 13.4 back in 2/15/2022.    Imaging reviewed:  CTA abdomen pelvis 7/26/2022 with no active extravasation           ASSESSMENT AND PLAN:   Hilaria Bonner is a 31 year old female with a history of alcohol use disorder, sleeve gastrectomy, PE c/b PEA arrest in 2018, now on rivaroxaban.  GI consulted for BRBPR.    She presents with likely post-hemorrhagic anemia (last baseline was in Feb 2022 at hgb 12) with tachycardia and signs of recent GI bleeding. Suspected melena vs BRBPR in ED and BRBPR on ICU exam.  Today, she has brown stool in the rectal vault on my exam.    Given the concern for likely lower GI bleeding (vs less likely ulcer in relation to her sleeve gastrectomy) we will plan for prep today and EGD and colonoscopy with MAC sedation tomorrow.  No clear likely source, though ulcer is a possibility, as is AVM, less likely diverticular.  Unlikely to be hemorrhoidal given degree of anemia.  May have had increased bleeding from a benign source in setting of rivaroxaban.    #1 Anemia, suspect post-hemorrhagic  #2 Suspected lower GI bleeding vs less likely upper GI bleeding  #3 Sleeve gastrectomy  #4 Obesity  #5 Anticoagulation with rivaroxaban for history of PE which caused PEA arrest  2018    Recommendations:  -- Please prep 4 L today and 2L tomorrow for colonoscopy and EGD  -- Please keep her NPO at midnight tonight, clear liquid diet without red dye until then  -- If OK from medicine perspective, please hold anticoagulation until the time of endoscopy given bleeding  -- Transfuse to hemoglobin 7 or hemodynamic stability/output   -- Would keep her on IV PPI 40 mg BID for now    The case was staffed with attending, Dr. Toledo.  Luminal will continue to follow.    Kishore Kauffman MD  Gastroenterology Fellow, PGY-6

## 2022-07-27 NOTE — PROGRESS NOTES
MEDICAL ICU PROGRESS NOTE  07/27/2022      Date of Service (when I saw the patient): 07/27/2022    ASSESSMENT: Marti Bonner is a 31 year old woman with pertinent past medical history of hypertension, Type II DM, alcohol use, prior pancreatitis, prior PEA complicated by subsequent PEA arrest on Xarelto, and anxiety admitted on 07/26/2022 for hypotensive shock secondary to probable GI bleed. Has been responding to fluid and blood, now s/p 3 liters LR and 2 U pRBC. Lactate trending down, most recently 2.2. Transferring to medicine with plan to have EGD/Colonoscopy with GI 07/28.      CHANGES and MAJOR THINGS TODAY:   - BP and hemoglobin stabilized following 3 liters LR and 2 uPRBC.   - Clear liquid diet until starting prep  - Start bowl prep with Golytely at 1700  - Transfer to medicine     PLAN:    Neuro:  # Pain and sedation  History of chronic pain, PTA taking pre gabalin and methocarbamol.   - Continue pre gabalin 200 mg PO TID  - Methocarbamol 500-1000 mg PO TID    # Syncopal episode, persistent presyncope  Concern for orthostatic hypotension  Reporting 2-3 weeks of dizziness, one fall, an red stools. MAP's initially in 50's, but has responded well to volume resuscitation. Received in total 3 liters LR and 2 U pRBC. Concern for GI bleed with red stools. Most likely etiology hypovolemia and symptomatic anemia.   - Continue volume resuscitation as needed    Chronic anxiety  - Continuing PTA venlafaxine  - Holding PTA Zyprexa patient normally gets at bedtime due to hypotensive episode     Pulmonary:  # Prior PE with subsequent PEA arrest (2018)  Has been on Xarelto, patient denies previous history of bleeding episodes. Breathing comfortably on room air today. Repeat TTE 07/27 without concern for RV dysfunction.   - Holding anticoagulation for EGD/colonoscopy AM 07/28, concern of GI bleeding    KENDALL  Trial of BiPAP overnight, appears patient did not tolerate as was on for under 2 hours based on chart review        Cardiovascular:  # Hypotensive Shock  MAP's initially in 50's, has responded well to volume during admission. Lactate trending down, most recent 2.2 AM 07/27. Has received 3 L LR and 2 uPRBC. Suspect most likely etiology is blood loss/hypovoemia. TTE with normal LVEF 55-60% 07/27, no concern for RV dysfunction.   - Consider additional volume resuscitation as patient has been NPO if BP continues to be soft    PTA Hypotension  - Holding lisinopril, hydrochlorothiazide, prazosin        GI/Nutrition:  # Hematochezia  History of 2-3 weeks, red stool visualized in hospital during admission but did BM overnight was brown without significant redness. Planning for EGD/Colonoscopy tomorrow.   - NPO at 1700  - Pantoprazole 40 mg BID IV  - Bowl prep ordered for 1700    Renal/Fluids/Electrolytes:  # Acute Kidney Injry  Creatine 2.18 admission, baseline 0.7-0.8, improving with volume resuscitation. AM 07/27 Cr 2.0.   - Continue to follow daily weights  - Bae CBC ordered    AGMA  Improved from 19 to 16 with volume resuscitation. Suspect contribution of elevated lactate (resolving), hypotension (resolving) and anemia (transfused).   - Continue to monitor AG    Endocrine:  # Chronic T2DM  A1c 5.4 on admission, takes metformin 500 mg BID PTA  - Holding metformin  - LDSSI Q4H, glucose monitoring     ID:  No acute concern for infection     Hematology:    # Acute blood loss anemia  Baseline 12-13, down to 6.5, improved to 8.1 AM 07/27 following 2 uPRBC.   - Daily CBC  - Hgb above 7    Musculoskeletal:  # Chronic peripheral neuropathy  - Continuing PTA pre gabalin 200 mg PO TID    Skin:  No active concerns    General Cares/Prophylaxis:    DVT Prophylaxis: Pneumatic Compression Devices  GI Prophylaxis: PPI  Restraints: none  Family Communication: Patient alert and oriented, motherCoco [phone]  Code Status: full code    Lines/tubes/drains:  - PIV X2    Disposition:  - Transferring to medicine with plan for EGD/colonscopy  "AM 07/28    Patient seen and findings/plan discussed with medical ICU staff, Dr. Leventhal.    Luan Bey MD    Clinically Significant Risk Factors Present on Admission             # Hypoalbuminemia: Albumin = 2.4 g/dL (Ref range: 3.5 - 5.2 g/dL) on admission, will monitor as appropriate   # Coagulation Defect: home medication list includes an anticoagulant medication    # Acute Blood Loss Anemia   # Severe Obesity: Estimated body mass index is 43.75 kg/m  as calculated from the following:    Height as of an earlier encounter on 7/26/22: 1.689 m (5' 6.5\").    Weight as of this encounter: 124.8 kg (275 lb 3.2 oz).        Luan Bey MD  PGY-1, Internal Medicine    Patient seen and discussed with staff physician, Dr. Leventhal.       ====================================  INTERVAL HISTORY:   Hypotensive overnight but did not need arterial line placement. No acute concerns. Did well with volume. Chief concern for patient at this time is hunger. Confirmed that she has had 2-3 weeks of red stools and dizziness.     OBJECTIVE:   1. VITAL SIGNS:   Temp:  [97.9  F (36.6  C)-98.5  F (36.9  C)] 98  F (36.7  C)  Pulse:  [] 102  Resp:  [8-24] 16  BP: ()/(28-85) 87/60  FiO2 (%):  [21 %] 21 %  SpO2:  [92 %-100 %] 99 %  FiO2 (%): 21 %  Resp: 16    2. INTAKE/ OUTPUT:   I/O last 3 completed shifts:  In: 2948.5 [I.V.:1298.5; IV Piggyback:1000]  Out: 485 [Urine:485]    3. PHYSICAL EXAMINATION:  General: Alert, appropriately conversant. Somewhat side tracked initially in conversation although was just awoken. Improved on recheck.   HEENT: No conjunctival erythema, no scleral icterus. PERRL.  Neuro: Alert and oriented X3  Pulm/Resp: Clear breath sounds bilaterally without rhonchi, crackles or wheeze, breathing non-labored  CV: RRR, no murmurs  Abdomen: Soft, non-distended, non-tender, BS present    4. LABS:   Arterial Blood Gases   Recent Labs   Lab 07/26/22  1722 07/26/22  1323   PH 7.37 7.33     Complete Blood Count "   Recent Labs   Lab 07/27/22  1217 07/27/22  0457 07/26/22  2223 07/26/22  1331 07/26/22  1320 07/26/22  1143   WBC  --  12.8* 11.7*  --  12.4* 12.1*   HGB 7.8* 8.1* 7.9* 6.1* 6.4* 6.5*   PLT  --  222 172  --  405 431     Basic Metabolic Panel  Recent Labs   Lab 07/27/22  1219 07/27/22  0815 07/27/22  0457 07/27/22  0409 07/26/22  1910 07/26/22  1331 07/26/22  1320 07/26/22  1143   NA  --   --  138  --   --  139 137 137   POTASSIUM  --   --  4.8  --   --  3.8 4.2 4.3   CHLORIDE  --   --  105  --   --   --  101 103   CO2  --   --  17*  --   --   --  17* 18*   BUN  --   --  17.9  --   --   --  19.5 20   CR  --   --  2.00*  --   --   --  2.14* 2.18*   GLC 89 87 71 82   < > 123* 132* 161*    < > = values in this interval not displayed.     Liver Function Tests  Recent Labs   Lab 07/26/22  1320 07/26/22  1143   AST  --  221*   * 134*   ALKPHOS 283* 283*   BILITOTAL 2.6* 2.6*   ALBUMIN 2.4* 1.7*   INR 1.95*  --      Coagulation Profile  Recent Labs   Lab 07/26/22  1320   INR 1.95*       5. RADIOLOGY:   Recent Results (from the past 24 hour(s))   CTA Abdomen Pelvis with Contrast    Narrative    CTA ABDOMEN AND PELVIS 7/26/2022 3:45 PM    CLINICAL HISTORY: GI bleed with hypotension.     COMPARISONS: CT 11/5/2020. Thoracic spine MR 9/29/2021.    REFERRING PROVIDER: EVARISTO ZAMAN    TECHNIQUE: Unenhanced CT performed through the abdomen and pelvis.  Contrast administered and patient reported discomfort. Contrast  stopped after 31.8 mL administered. No infiltration or extravasation  was noted by the technologist. Other arm was used and contrast was  administered without complaint. Following the uneventful  administration of intravenous contrast, somewhat delayed CTA performed  through the abdomen and pelvis. After an 80 second delay, CT repeated  through the abdomen and pelvis. Coronal and sagittal reconstructions  produced. Coronal MIP reconstructions produced.    3D and multiplanar reconstructions were unavailable at  the time of  dictation.    CONTRAST: 131.8 mL Isovue 370    DOSE (DLP): 2106 mGy*cm    FINDINGS: CTA: No active extravasation demonstrated.    Aorta patent without stenosis, dissection, or aneurysm. Celiac,  superior mesenteric, bilateral single renal, and inferior mesenteric  arteries patent. Bilateral common, internal, and external iliac  arteries patent. Bilateral common femoral arteries patent.    Bilateral common femoral veins patent. Bilateral common, internal, and  external iliac veins patent. Inferior vena cava patent. Renal and  hepatic veins patent.    Splenic, superior mesenteric, and portal veins patent.    CT: Lung bases clear.    Hepatosteatosis.    Sleeve gastrectomy postoperative changes. Bowel decompressed.    Gallbladder sludge suggested. No pericholecystic fluid.    T9 compression fracture, similar to previous.      Impression    IMPRESSION: No active extravasation demonstrated.    JACOB LOMAX MD         SYSTEM ID:  [id number]   Echo Complete   Result Value    LVEF  55-60%    Narrative    [id number]  [id number]  [id number]  [id number]^DUARTE^GRACIELA     [facility],[place]  Echocardiography Laboratory  [address] [place] [id number]     Name: RADHA JOHANSEN  MRN: [id number]  : 1990  Study Date: 2022 07:54 AM  Age: 31 yrs  Gender: Female  Patient Location: [facility]  Reason For Study: Shock  Ordering Physician: GRACIELA RAMACHANDRAN  Performed By: Rebekah Mayer RDCS     BSA: 2.3 m2  Height: 66 in  Weight: 275 lb  HR: 91  BP: 88/62 mmHg  ______________________________________________________________________________  Procedure  Complete Portable Echo Adult. Technically difficult study.  ______________________________________________________________________________  Interpretation Summary  Global and regional left ventricular function is normal with an EF of 55-60%.  Right ventricular function, chamber size, wall motion, and thickness are  normal.  Pulmonary artery  systolic pressure cannot be assessed.  The inferior vena cava cannot be assessed.  No pericardial effusion is present.  There has been no change.  ______________________________________________________________________________  Left Ventricle  Global and regional left ventricular function is normal with an EF of 55-60%.  Left ventricular wall thickness is normal. Left ventricular size is normal.  Left ventricular diastolic function is normal. No regional wall motion  abnormalities are seen.     Right Ventricle  Right ventricular function, chamber size, wall motion, and thickness are  normal.     Atria  Both atria appear normal. The atrial septum is intact as assessed by color  Doppler .     Mitral Valve  The mitral valve is normal.     Aortic Valve  The valve leaflets are not well visualized. On Doppler interrogation, there is  no significant stenosis or regurgitation.     Tricuspid Valve  The tricuspid valve is normal. Pulmonary artery systolic pressure cannot be  assessed.     Pulmonic Valve  The valve leaflets are not well visualized. On Doppler interrogation, there is  no significant stenosis or regurgitation.     Vessels  The thoracic aorta is normal. The pulmonary artery and bifurcation cannot be  assessed. The inferior vena cava cannot be assessed.     Pericardium  No pericardial effusion is present.     Compared to Previous Study  There has been no change.  ______________________________________________________________________________  MMode/2D Measurements & Calculations     RVDd: 3.8 cm  IVSd: 0.85 cm  LVIDd: 4.1 cm  LVIDs: 3.0 cm  LVPWd: 0.99 cm  FS: 27.3 %  LV mass(C)d: 119.2 grams  LV mass(C)dI: 52.0 grams/m2  asc Aorta Diam: 2.8 cm  LVOT diam: 2.2 cm  LVOT area: 3.9 cm2  LA Volume Index (BP): 26.0 ml/m2  RWT: 0.48  TAPSE: 2.9 cm     Doppler Measurements & Calculations  MV E max jazmyne: 100.3 cm/sec  MV A max jazmyne: 67.6 cm/sec  MV E/A: 1.5  MV dec slope: 597.7 cm/sec2  MV dec time: 0.17 sec  E/E' avg:  7.3  Lateral E/e': 6.5  Medial E/e': 8.1     ______________________________________________________________________________  Report approved by: Zaid Shipman 07/27/2022 08:35 AM

## 2022-07-28 ENCOUNTER — ANESTHESIA (OUTPATIENT)
Dept: GASTROENTEROLOGY | Facility: CLINIC | Age: 32
End: 2022-07-28
Payer: COMMERCIAL

## 2022-07-28 ENCOUNTER — ANESTHESIA EVENT (OUTPATIENT)
Dept: GASTROENTEROLOGY | Facility: CLINIC | Age: 32
End: 2022-07-28
Payer: COMMERCIAL

## 2022-07-28 LAB
ANION GAP SERPL CALCULATED.3IONS-SCNC: 17 MMOL/L (ref 7–15)
BACTERIA UR CULT: NORMAL
BUN SERPL-MCNC: 11.5 MG/DL (ref 6–20)
CALCIUM SERPL-MCNC: 8 MG/DL (ref 8.6–10)
CHLORIDE SERPL-SCNC: 100 MMOL/L (ref 98–107)
COLONOSCOPY: NORMAL
CREAT SERPL-MCNC: 1.6 MG/DL (ref 0.51–0.95)
DEPRECATED HCO3 PLAS-SCNC: 18 MMOL/L (ref 22–29)
ERYTHROCYTE [DISTWIDTH] IN BLOOD BY AUTOMATED COUNT: 19 % (ref 10–15)
GFR SERPL CREATININE-BSD FRML MDRD: 44 ML/MIN/1.73M2
GLUCOSE BLDC GLUCOMTR-MCNC: 106 MG/DL (ref 70–99)
GLUCOSE BLDC GLUCOMTR-MCNC: 124 MG/DL (ref 70–99)
GLUCOSE BLDC GLUCOMTR-MCNC: 89 MG/DL (ref 70–99)
GLUCOSE BLDC GLUCOMTR-MCNC: 94 MG/DL (ref 70–99)
GLUCOSE SERPL-MCNC: 123 MG/DL (ref 70–99)
HCT VFR BLD AUTO: 33 % (ref 35–47)
HGB BLD-MCNC: 10.2 G/DL (ref 11.7–15.7)
HGB BLD-MCNC: 9.3 G/DL (ref 11.7–15.7)
MCH RBC QN AUTO: 32.5 PG (ref 26.5–33)
MCHC RBC AUTO-ENTMCNC: 30.9 G/DL (ref 31.5–36.5)
MCV RBC AUTO: 105 FL (ref 78–100)
PATH REPORT.COMMENTS IMP SPEC: NORMAL
PATH REPORT.COMMENTS IMP SPEC: NORMAL
PATH REPORT.FINAL DX SPEC: NORMAL
PATH REPORT.MICROSCOPIC SPEC OTHER STN: NORMAL
PATH REPORT.MICROSCOPIC SPEC OTHER STN: NORMAL
PATH REPORT.RELEVANT HX SPEC: NORMAL
PLATELET # BLD AUTO: 340 10E3/UL (ref 150–450)
POTASSIUM SERPL-SCNC: 4.1 MMOL/L (ref 3.4–5.3)
RBC # BLD AUTO: 3.14 10E6/UL (ref 3.8–5.2)
SODIUM SERPL-SCNC: 135 MMOL/L (ref 136–145)
UPPER GI ENDOSCOPY: NORMAL
WBC # BLD AUTO: 11.9 10E3/UL (ref 4–11)

## 2022-07-28 PROCEDURE — 250N000013 HC RX MED GY IP 250 OP 250 PS 637: Performed by: INTERNAL MEDICINE

## 2022-07-28 PROCEDURE — 85014 HEMATOCRIT: CPT | Performed by: INTERNAL MEDICINE

## 2022-07-28 PROCEDURE — 82310 ASSAY OF CALCIUM: CPT | Performed by: INTERNAL MEDICINE

## 2022-07-28 PROCEDURE — 88305 TISSUE EXAM BY PATHOLOGIST: CPT | Mod: TC | Performed by: INTERNAL MEDICINE

## 2022-07-28 PROCEDURE — 370N000017 HC ANESTHESIA TECHNICAL FEE, PER MIN: Performed by: INTERNAL MEDICINE

## 2022-07-28 PROCEDURE — 250N000011 HC RX IP 250 OP 636: Performed by: NURSE ANESTHETIST, CERTIFIED REGISTERED

## 2022-07-28 PROCEDURE — 0DJD8ZZ INSPECTION OF LOWER INTESTINAL TRACT, VIA NATURAL OR ARTIFICIAL OPENING ENDOSCOPIC: ICD-10-PCS | Performed by: INTERNAL MEDICINE

## 2022-07-28 PROCEDURE — 250N000013 HC RX MED GY IP 250 OP 250 PS 637

## 2022-07-28 PROCEDURE — 258N000003 HC RX IP 258 OP 636: Performed by: NURSE ANESTHETIST, CERTIFIED REGISTERED

## 2022-07-28 PROCEDURE — 45378 DIAGNOSTIC COLONOSCOPY: CPT | Performed by: INTERNAL MEDICINE

## 2022-07-28 PROCEDURE — C9113 INJ PANTOPRAZOLE SODIUM, VIA: HCPCS

## 2022-07-28 PROCEDURE — 36415 COLL VENOUS BLD VENIPUNCTURE: CPT | Performed by: INTERNAL MEDICINE

## 2022-07-28 PROCEDURE — 120N000002 HC R&B MED SURG/OB UMMC

## 2022-07-28 PROCEDURE — 250N000011 HC RX IP 250 OP 636

## 2022-07-28 PROCEDURE — 88305 TISSUE EXAM BY PATHOLOGIST: CPT | Mod: 26 | Performed by: PATHOLOGY

## 2022-07-28 PROCEDURE — 85018 HEMOGLOBIN: CPT | Performed by: STUDENT IN AN ORGANIZED HEALTH CARE EDUCATION/TRAINING PROGRAM

## 2022-07-28 PROCEDURE — 250N000013 HC RX MED GY IP 250 OP 250 PS 637: Performed by: STUDENT IN AN ORGANIZED HEALTH CARE EDUCATION/TRAINING PROGRAM

## 2022-07-28 PROCEDURE — 83550 IRON BINDING TEST: CPT | Performed by: INTERNAL MEDICINE

## 2022-07-28 PROCEDURE — 43239 EGD BIOPSY SINGLE/MULTIPLE: CPT | Performed by: INTERNAL MEDICINE

## 2022-07-28 PROCEDURE — 0DB98ZX EXCISION OF DUODENUM, VIA NATURAL OR ARTIFICIAL OPENING ENDOSCOPIC, DIAGNOSTIC: ICD-10-PCS | Performed by: INTERNAL MEDICINE

## 2022-07-28 PROCEDURE — 36415 COLL VENOUS BLD VENIPUNCTURE: CPT | Performed by: STUDENT IN AN ORGANIZED HEALTH CARE EDUCATION/TRAINING PROGRAM

## 2022-07-28 PROCEDURE — 84466 ASSAY OF TRANSFERRIN: CPT | Performed by: INTERNAL MEDICINE

## 2022-07-28 PROCEDURE — 99232 SBSQ HOSP IP/OBS MODERATE 35: CPT | Mod: GC | Performed by: INTERNAL MEDICINE

## 2022-07-28 RX ORDER — ONDANSETRON 2 MG/ML
4 INJECTION INTRAMUSCULAR; INTRAVENOUS EVERY 30 MIN PRN
Status: CANCELLED | OUTPATIENT
Start: 2022-07-28

## 2022-07-28 RX ORDER — PROPOFOL 10 MG/ML
INJECTION, EMULSION INTRAVENOUS PRN
Status: DISCONTINUED | OUTPATIENT
Start: 2022-07-28 | End: 2022-07-28

## 2022-07-28 RX ORDER — PROPOFOL 10 MG/ML
INJECTION, EMULSION INTRAVENOUS CONTINUOUS PRN
Status: DISCONTINUED | OUTPATIENT
Start: 2022-07-28 | End: 2022-07-28

## 2022-07-28 RX ORDER — SODIUM CHLORIDE, SODIUM LACTATE, POTASSIUM CHLORIDE, CALCIUM CHLORIDE 600; 310; 30; 20 MG/100ML; MG/100ML; MG/100ML; MG/100ML
INJECTION, SOLUTION INTRAVENOUS CONTINUOUS
Status: CANCELLED | OUTPATIENT
Start: 2022-07-28

## 2022-07-28 RX ORDER — ONDANSETRON 4 MG/1
4 TABLET, ORALLY DISINTEGRATING ORAL EVERY 30 MIN PRN
Status: CANCELLED | OUTPATIENT
Start: 2022-07-28

## 2022-07-28 RX ORDER — SODIUM CHLORIDE, SODIUM LACTATE, POTASSIUM CHLORIDE, CALCIUM CHLORIDE 600; 310; 30; 20 MG/100ML; MG/100ML; MG/100ML; MG/100ML
INJECTION, SOLUTION INTRAVENOUS CONTINUOUS PRN
Status: DISCONTINUED | OUTPATIENT
Start: 2022-07-28 | End: 2022-07-28

## 2022-07-28 RX ORDER — SODIUM CHLORIDE, SODIUM LACTATE, POTASSIUM CHLORIDE, CALCIUM CHLORIDE 600; 310; 30; 20 MG/100ML; MG/100ML; MG/100ML; MG/100ML
INJECTION, SOLUTION INTRAVENOUS CONTINUOUS
Status: DISCONTINUED | OUTPATIENT
Start: 2022-07-28 | End: 2022-07-28 | Stop reason: HOSPADM

## 2022-07-28 RX ORDER — HYDROMORPHONE HCL IN WATER/PF 6 MG/30 ML
0.2 PATIENT CONTROLLED ANALGESIA SYRINGE INTRAVENOUS EVERY 5 MIN PRN
Status: CANCELLED | OUTPATIENT
Start: 2022-07-28

## 2022-07-28 RX ORDER — OXYCODONE HYDROCHLORIDE 5 MG/1
5 TABLET ORAL EVERY 4 HOURS PRN
Status: CANCELLED | OUTPATIENT
Start: 2022-07-28

## 2022-07-28 RX ORDER — FENTANYL CITRATE 50 UG/ML
25 INJECTION, SOLUTION INTRAMUSCULAR; INTRAVENOUS EVERY 5 MIN PRN
Status: CANCELLED | OUTPATIENT
Start: 2022-07-28

## 2022-07-28 RX ORDER — LIDOCAINE 40 MG/G
CREAM TOPICAL
Status: DISCONTINUED | OUTPATIENT
Start: 2022-07-28 | End: 2022-07-28 | Stop reason: HOSPADM

## 2022-07-28 RX ADMIN — Medication 1 TABLET: at 09:07

## 2022-07-28 RX ADMIN — PANTOPRAZOLE SODIUM 40 MG: 40 INJECTION, POWDER, FOR SOLUTION INTRAVENOUS at 00:04

## 2022-07-28 RX ADMIN — ACETAMINOPHEN 650 MG: 325 TABLET, FILM COATED ORAL at 16:09

## 2022-07-28 RX ADMIN — PROPOFOL 20 MG: 10 INJECTION, EMULSION INTRAVENOUS at 14:02

## 2022-07-28 RX ADMIN — BISACODYL 10 MG: 5 TABLET, COATED ORAL at 03:31

## 2022-07-28 RX ADMIN — POLYETHYLENE GLYCOL 3350, SODIUM SULFATE ANHYDROUS, SODIUM BICARBONATE, SODIUM CHLORIDE, POTASSIUM CHLORIDE 2000 ML: 236; 22.74; 6.74; 5.86; 2.97 POWDER, FOR SOLUTION ORAL at 03:31

## 2022-07-28 RX ADMIN — PREGABALIN 200 MG: 100 CAPSULE ORAL at 19:39

## 2022-07-28 RX ADMIN — PHENYLEPHRINE HYDROCHLORIDE 200 MCG: 10 INJECTION INTRAVENOUS at 14:04

## 2022-07-28 RX ADMIN — METHOCARBAMOL 500 MG: 500 TABLET ORAL at 00:04

## 2022-07-28 RX ADMIN — METHOCARBAMOL 1000 MG: 500 TABLET ORAL at 16:08

## 2022-07-28 RX ADMIN — METHOCARBAMOL 1000 MG: 500 TABLET ORAL at 09:07

## 2022-07-28 RX ADMIN — GLYCERIN, PETROLATUM, PHENYLEPHRINE HCL, PRAMOXINE HCL: 144; 2.5; 10; 15 CREAM TOPICAL at 19:39

## 2022-07-28 RX ADMIN — HYDROXYZINE HYDROCHLORIDE 25 MG: 25 TABLET, FILM COATED ORAL at 16:09

## 2022-07-28 RX ADMIN — PROPOFOL 20 MG: 10 INJECTION, EMULSION INTRAVENOUS at 13:47

## 2022-07-28 RX ADMIN — GLYCERIN, PETROLATUM, PHENYLEPHRINE HCL, PRAMOXINE HCL: 144; 2.5; 10; 15 CREAM TOPICAL at 09:07

## 2022-07-28 RX ADMIN — PHENYLEPHRINE HYDROCHLORIDE 200 MCG: 10 INJECTION INTRAVENOUS at 14:00

## 2022-07-28 RX ADMIN — ACETAMINOPHEN 650 MG: 325 TABLET, FILM COATED ORAL at 00:03

## 2022-07-28 RX ADMIN — ACETAMINOPHEN 650 MG: 325 TABLET, FILM COATED ORAL at 09:07

## 2022-07-28 RX ADMIN — PHENYLEPHRINE HYDROCHLORIDE 200 MCG: 10 INJECTION INTRAVENOUS at 13:55

## 2022-07-28 RX ADMIN — APIXABAN 5 MG: 5 TABLET, FILM COATED ORAL at 19:39

## 2022-07-28 RX ADMIN — PROPOFOL 20 MG: 10 INJECTION, EMULSION INTRAVENOUS at 13:54

## 2022-07-28 RX ADMIN — VENLAFAXINE HYDROCHLORIDE 225 MG: 150 CAPSULE, EXTENDED RELEASE ORAL at 09:07

## 2022-07-28 RX ADMIN — METHOCARBAMOL 1000 MG: 500 TABLET ORAL at 02:20

## 2022-07-28 RX ADMIN — PROPOFOL 20 MG: 10 INJECTION, EMULSION INTRAVENOUS at 13:57

## 2022-07-28 RX ADMIN — PROPOFOL 100 MCG/KG/MIN: 10 INJECTION, EMULSION INTRAVENOUS at 13:57

## 2022-07-28 RX ADMIN — PHENYLEPHRINE HYDROCHLORIDE 200 MCG: 10 INJECTION INTRAVENOUS at 14:12

## 2022-07-28 RX ADMIN — SODIUM CHLORIDE, POTASSIUM CHLORIDE, SODIUM LACTATE AND CALCIUM CHLORIDE: 600; 310; 30; 20 INJECTION, SOLUTION INTRAVENOUS at 13:46

## 2022-07-28 RX ADMIN — PROPOFOL 20 MG: 10 INJECTION, EMULSION INTRAVENOUS at 13:51

## 2022-07-28 RX ADMIN — FOLIC ACID 1 MG: 1 TABLET ORAL at 09:17

## 2022-07-28 RX ADMIN — PREGABALIN 200 MG: 100 CAPSULE ORAL at 09:07

## 2022-07-28 RX ADMIN — HYDROXYZINE HYDROCHLORIDE 25 MG: 25 TABLET, FILM COATED ORAL at 09:07

## 2022-07-28 RX ADMIN — PHENYLEPHRINE HYDROCHLORIDE 200 MCG: 10 INJECTION INTRAVENOUS at 13:49

## 2022-07-28 ASSESSMENT — ACTIVITIES OF DAILY LIVING (ADL)
ADLS_ACUITY_SCORE: 48
ADLS_ACUITY_SCORE: 46
ADLS_ACUITY_SCORE: 48
ADLS_ACUITY_SCORE: 46
ADLS_ACUITY_SCORE: 46
ADLS_ACUITY_SCORE: 48
ADLS_ACUITY_SCORE: 46
ADLS_ACUITY_SCORE: 48

## 2022-07-28 ASSESSMENT — LIFESTYLE VARIABLES: TOBACCO_USE: 1

## 2022-07-28 NOTE — ANESTHESIA POSTPROCEDURE EVALUATION
Patient: Hilaria Bonner    Procedure: Procedure(s):  ESOPHAGOGASTRODUODENOSCOPY, WITH BIOPSY  COLONOSCOPY       Anesthesia Type:  MAC    Note:  Disposition: Inpatient   Postop Pain Control: Uneventful            Sign Out: Well controlled pain   PONV: No   Neuro/Psych: Uneventful            Sign Out: Acceptable/Baseline neuro status   Airway/Respiratory: Uneventful            Sign Out: Acceptable/Baseline resp. status   CV/Hemodynamics: Uneventful            Sign Out: Acceptable CV status; No obvious hypovolemia; No obvious fluid overload   Other NRE: NONE   DID A NON-ROUTINE EVENT OCCUR? No           Last vitals:  Vitals:    07/28/22 1300 07/28/22 1315 07/28/22 1330   BP:      Pulse: 84 79 84   Resp: 14 16    Temp:      SpO2:          Electronically Signed By: Yash Guzmán MD  July 28, 2022  2:41 PM

## 2022-07-28 NOTE — PROGRESS NOTES
Gastroenterology Inpatient Sign Off Note    Luminal service will sign off. No further recommendations at this time. If primary team has addition questions, please page the consult fellow listed in Kevin.    Current GI Consult Staff: Dr. Toledo    A/P:  31 F with ? Melena vs rectal bleeding in setting of anticoagulation for DVT/PE with PEA arrest in 2018 and anemia.    Recommendations:  - EGD colonoscopy with no findings to explain anemia.  - Biopsies taken for celiac disease.  This is unlikely.  We will follow this up.  - No contraindication for anticoagulation per GI.  - Hematology work-up per primary service.  - GI will sign off.    Follow up recommendations:   Follow-up in GI clinic is not indicated.  Follow-up with primary care provider at timing determined by discharge physician.    Case staffed with attending, Dr. Toledo who agrees with this assessment and plan.

## 2022-07-28 NOTE — ANESTHESIA PREPROCEDURE EVALUATION
Anesthesia Pre-Procedure Evaluation    Patient: Hilaria Bonner   MRN: [id number] : 1990        Procedure : Procedure(s):  ESOPHAGOGASTRODUODENOSCOPY (EGD)  COLONOSCOPY          Past Medical History:   Diagnosis Date     Acute kidney injury (H) 2019     Acute massive pulmonary embolism (H) 2019     Acute pancreatitis 2018     Acute pancreatitis 2021    due to ETOH     Acute thoracic back pain 2021     ARAMIS (acute kidney injury) (H) 2019     Cardiac arrest, cause unspecified (H) 2019    massive pulmonary embolism with pulseless electrical activity cardiac arrest in May 2019 following gastric bypass in 2019      Depression with anxiety      GERD (gastroesophageal reflux disease)      History of alcohol use disorder      HTN (hypertension)      Infection due to 2019 novel coronavirus 2020    COVID19 infection in 2020     Ischemic colitis (H) 2019     Morbid obesity (H)      Scalp laceration, initial encounter 2020      Past Surgical History:   Procedure Laterality Date      SECTION       LAPAROSCOPIC GASTRIC SLEEVE N/A 2019    Procedure: Laparoscopic Sleeve Gastrectomy;  Surgeon: Luan Lopez MD;  Location: [facility] OR     ORTHOPEDIC SURGERY      2 knee meniscus surgery      No Known Allergies   Social History     Tobacco Use     Smoking status: Current Every Day Smoker     Packs/day: 0.25     Years: 1.00     Pack years: 0.25     Types: Cigarettes     Start date: 2016     Smokeless tobacco: Never Used   Substance Use Topics     Alcohol use: Not Currently     Comment: Quit drinking when last hospitalized      Wt Readings from Last 1 Encounters:   22 124.8 kg (275 lb 3.2 oz)        Anesthesia Evaluation   Pt has had prior anesthetic. Type: General.        ROS/MED HX  ENT/Pulmonary:     (+) sleep apnea, uses CPAP, tobacco use, Current use,     Neurologic:       Cardiovascular:     (+) hypertension-----Taking blood  thinners     METS/Exercise Tolerance:     Hematologic:     (+) History of blood clots, pt is anticoagulated,     Musculoskeletal:       GI/Hepatic:     (+) GERD,     Renal/Genitourinary:     (+) renal disease, type: ARF,     Endo:     (+) type II DM, Obesity,     Psychiatric/Substance Use:       Infectious Disease:       Malignancy:       Other:            Physical Exam    Airway        Mallampati: I    Neck ROM: limited     Respiratory Devices and Support         Dental  no notable dental history         Cardiovascular   cardiovascular exam normal          Pulmonary   pulmonary exam normal                OUTSIDE LABS:  CBC:   Lab Results   Component Value Date    WBC 11.9 (H) 07/28/2022    WBC 12.8 (H) 07/27/2022    HGB 10.2 (L) 07/28/2022    HGB 9.2 (L) 07/27/2022    HCT 33.0 (L) 07/28/2022    HCT 24.3 (L) 07/27/2022     07/28/2022     07/27/2022     BMP:   Lab Results   Component Value Date     (L) 07/28/2022     07/27/2022    POTASSIUM 4.1 07/28/2022    POTASSIUM 4.8 07/27/2022    CHLORIDE 100 07/28/2022    CHLORIDE 105 07/27/2022    CO2 18 (L) 07/28/2022    CO2 17 (L) 07/27/2022    BUN 11.5 07/28/2022    BUN 17.9 07/27/2022    CR 1.60 (H) 07/28/2022    CR 2.00 (H) 07/27/2022     (H) 07/28/2022     (H) 07/28/2022     COAGS:   Lab Results   Component Value Date    PTT 32 05/30/2020    INR 1.95 (H) 07/26/2022     POC:   Lab Results   Component Value Date    BGM 82 06/05/2020    HCG Negative 07/26/2022    HCGS Negative 05/30/2020     HEPATIC:   Lab Results   Component Value Date    ALBUMIN 2.4 (L) 07/26/2022    PROTTOTAL 5.9 (L) 07/26/2022     (H) 07/26/2022    AST  07/26/2022      Comment:      Unsatisfactory specimen - hemolyzed - Notified  Héctor Reveles RN     (H) 11/04/2021    ALKPHOS 283 (H) 07/26/2022    BILITOTAL 2.6 (H) 07/26/2022     OTHER:   Lab Results   Component Value Date    PH 7.37 07/26/2022    LACT 2.2 (H) 07/27/2022    A1C 5.4 07/26/2022     QUINCY 8.0 (L) 07/28/2022    PHOS 2.9 05/29/2020    MAG 2.0 05/29/2020    LIPASE 258 02/15/2022    AMYLASE 78 02/15/2022    TSH 3.90 07/26/2022    CRP 3.6 10/22/2020    SED 33 (H) 05/30/2020       Anesthesia Plan    ASA Status:  2   NPO Status:  NPO Appropriate    Anesthesia Type: MAC.     - Reason for MAC: straight local not clinically adequate   Induction: Intravenous, Propofol.   Maintenance: TIVA.        Consents    Anesthesia Plan(s) and associated risks, benefits, and realistic alternatives discussed. Questions answered and patient/representative(s) expressed understanding.    - Discussed:     - Discussed with:  Patient         Postoperative Care    Pain management: IV analgesics.   PONV prophylaxis: Ondansetron (or other 5HT-3)     Comments:                Yash Guzmán MD

## 2022-07-28 NOTE — OR NURSING
EGD with biopsy. Colonoscopy with no interventions. MAC sedation and tolerated well. Report called to bedside RN. Pt transported to PACU.

## 2022-07-28 NOTE — PLAN OF CARE
[id number]:    ICU End of Shift Summary. See flowsheets for vital signs and detailed assessment.    Changes this shift: Continues with rectal spasms/pain. PRN Tylenol, Atarax and Robaxin given with relief. 1600 Hemoglobin stable at 9.3. Diet regular. Patient excited to eat. [facility] orders changed to Adult Med Surg.     Plan: Transfer to Adult Med Surg once bed becomes available.Continue with POC and notify team of any changes or concerns.       Goal Outcome Evaluation:    Plan of Care Reviewed With: patient     Overall Patient Progress: improving    Outcome Evaluation: Patient got transfer orders from [facility] to Adult Med Surgery.

## 2022-07-28 NOTE — PROGRESS NOTES
[facility]    Progress Note - Medicine Service, MAROON TEAM 1       Date of Admission:  7/26/2022    Assessment & Plan        Hilaria Bonner is a 31 year old female admitted on 7/26/2022. She has a history of HTN, T2DM, alcohol use, and prior PE c/b subsequent PEA arrest on Xarelto, fatty liver disease and anxiety who is admitted for hypotensive shock secondary to likely GI bleed.     Changes today:  - hgb improving  - colonoscopy and EGD today, no evidence active bleeding or stigmata of recent bleeding --> ok to resume AC  - trial apixaban, monitor closely with BID hgb   - obtain more hx re: R foot fx, consult ortho if needed given XR results     #GI bleed  #Acute anemia 2/2 blood loss  #Hypovolemic shock 2/2 blood loss   P/w 3 weeks of hematochezia with associated lightheadedness and fatigue. Baseline hgb wnl, on arrival 6.4, improved to 7.8 after 2 U pRBC. Patient remains hypotensive, but has been been responsive to fluid resuscitation. Ddx includes UGIB vs LGIB, including diverticular bleed, PUD. CTA abd/pelvis negative for active extravasation. Hx fatty liver, no evidence cirrhosis/decompensation at this time.   -Hold PTA Xarelto, defer bridging with heparin gtt until colonoscopy/EGD complete, hgb stability   -HGB Q8h, transfuse if hgb<7  -Consulted GI   -Plan for endoscopy and colonoscopy today, post-procedure recs pending   -continue to bolus LR prn for hypotension (markedly improved)   -Enteric bacteria and virus panel by OLIVIER stool pending, f/u with results    #ARAMIS - improving  Pt presented with creatinine elevated to 2.18 on admission with baseline at 0.7-0.8. Likely due to prerenal hypotension. Improving with pRBC and fluid resuscitation. Creatinine this morning was 2.   -2L of LR provided for fluid resuscitation  -Continue to monitor with AM BMP    #Lactic acidosis - improving   Bicarb improved from 19 to 16 with volume resuscitation. Suspect contribution  of elevated lactate (6/7 on admit, now down to 2.2), hypotension (resolving) and anemia (transfused).   - trend BMP    Chronic Medical Problems  #Hx of PE c/b PEA arrest in 2018  -Hold PTA Xarelto suspected bleed   -ECHO showed normal global and regional left ventricular function with EF of 55-60%. No changes compared to previous study.    #Chronic peripheral neuropathy  -PTA Pregabalin     #Type 2 diabetes  A1c on admission 5.4. Glucose has been in 80s since arrival.   -Hold PTA metformin  -monitor on BMP daily, will increase frequency if needed    #Chronic anxiety  -Continue PTA Venlafaxine  -Hold Zyprexa due to low BP    #Obstructive Sleep Apnea  -CPAP at night    #Chronic hypertension  -Hold PTA Lisinopril, hydrochlorothiazide, and Prazosin    #Right foot fracture  Pt reports fracturing R foot about 3-4 weeks ago and seen by ortho at that time, given boot. Unfortunately cannot locate these records.   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 Appears pt has been following with neurology. Sx onset April 2020 4 weeks post-covid. NCS in 7/20 showed absent sensory responses in the upper and lower limbs and normal motor responses. Has been on maintenance IVIG q3 weeks (recently decreased to [id number]) since then and sx managed well with this per last neuro note 5/9/22.  - NTD       Diet: NPO per Anesthesia Guidelines for Procedure/Surgery Except for: Meds, Ice Chips    DVT Prophylaxis: VTE Prophylaxis contraindicated due to GI bleed  Gonzales Catheter: Not present  Fluids: LR fluid bolus  Central Lines: None  Cardiac Monitoring: ACTIVE order. Indication: ICU  Code Status: Full Code      The patient's care was discussed with the Attending Physician, Dr. Unger and  "Patient.    Bertha Gutierrez MD  Medicine Service, MAROON TEAM [address] [place] [address]  Securely message with the Vocera Web Console (learn more here)  Text page via [facility] Paging/Directory   Please see signed in provider for up to date coverage information      Clinically Significant Risk Factors Present on Admission                # Severe Obesity: Estimated body mass index is 43.75 kg/m  as calculated from the following:    Height as of an earlier encounter on 7/26/22: 1.689 m (5' 6.5\").    Weight as of this encounter: 124.8 kg (275 lb 3.2 oz).        ______________________________________________________________________    Interval History   NAEON. Hgb stable/improving, completed bowel prep. BP improving. Intermittent mild tachycardia. States she feels ok this morning. She is joking with the nursing assistants and nurses. She feels less fatigued than yesterday. No lightheadedness.     Data reviewed today: I reviewed all medications, new labs and imaging results over the last 24 hours.  Physical Exam   Vital Signs: Temp: 97.5  F (36.4  C) Temp src: Oral BP: 109/76 Pulse: 110   Resp: 18 SpO2: 92 % O2 Device: None (Room air)    Weight: 275 lbs 3.2 oz  General Appearance: No acute distress  Respiratory: clear lung sounds, no increased work of breathing  Cardiovascular: regular rate and rhythm  GI: mild left upper quadrant tenderness on palpation  Other: Alert and awake     Data   Recent Labs   Lab 07/28/22  0026 07/27/22  2105 07/27/22  2028 07/27/22  1530 07/27/22  1219 07/27/22  1217 07/27/22  0815 07/27/22  0457 07/27/22  0015 07/26/22  2223 07/26/22  1910 07/26/22  1331 07/26/22  1320 07/26/22  1143 07/26/22  1143   WBC  --   --   --   --   --   --   --  12.8*  --  11.7*  --   --  12.4*  --  12.1*   HGB  --  9.2*  --   --   --  7.8*  --  8.1*  --  7.9*  --  6.1* 6.4*  --  6.5*   MCV  --   --   --   --   --   --   --  99  --  103*  --   --  110*  --  113*   PLT  --   --   --   --   " --   --   --  222  --  172  --   --  405  --  431   INR  --   --   --   --   --   --   --   --   --   --   --   --  1.95*  --   --    NA  --   --   --   --   --   --   --  138  --   --   --  139 137  --  137   POTASSIUM  --   --   --   --   --   --   --  4.8  --   --   --  3.8 4.2  --  4.3   CHLORIDE  --   --   --   --   --   --   --  105  --   --   --   --  101  --  103   CO2  --   --   --   --   --   --   --  17*  --   --   --   --  17*  --  18*   BUN  --   --   --   --   --   --   --  17.9  --   --   --   --  19.5  --  20   CR  --   --   --   --   --   --   --  2.00*  --   --   --   --  2.14*  --  2.18*   ANIONGAP  --   --   --   --   --   --   --  16*  --   --   --   --  19*  --  16*   QUINCY  --   --   --   --   --   --   --  7.6*  --   --   --   --  7.9*  --  7.9*   GLC 94  --  180* 128*   < >  --    < > 71   < >  --    < > 123* 132*   < > 161*   ALBUMIN  --   --   --   --   --   --   --   --   --   --   --   --  2.4*  --  1.7*   PROTTOTAL  --   --   --   --   --   --   --   --   --   --   --   --  5.9*  --  6.3*   BILITOTAL  --   --   --   --   --   --   --   --   --   --   --   --  2.6*  --  2.6*   ALKPHOS  --   --   --   --   --   --   --   --   --   --   --   --  283*  --  283*   ALT  --   --   --   --   --   --   --   --   --   --   --   --  123*  --  134*   AST  --   --   --   --   --   --   --   --   --   --   --   --   --   --  221*    < > = values in this interval not displayed.

## 2022-07-28 NOTE — PLAN OF CARE
Goal Outcome Evaluation:    End of Shift Summary. See flowsheets for vital signs and detailed assessment.    Changes this shift: VSS. No acute changes. Continuing to stool due to bowle prep. EGD and Colonoscopt done today.    Plan: Transfer out to M/S unit.

## 2022-07-28 NOTE — ANESTHESIA CARE TRANSFER NOTE
Patient: Hilaria Bonner    Procedure: Procedure(s):  ESOPHAGOGASTRODUODENOSCOPY, WITH BIOPSY  COLONOSCOPY       Diagnosis: GIB (gastrointestinal bleeding) [K92.2]  Diagnosis Additional Information: No value filed.    Anesthesia Type:   MAC     Note:    Oropharynx: oropharynx clear of all foreign objects and spontaneously breathing  Level of Consciousness: awake  Oxygen Supplementation: room air    Independent Airway: airway patency satisfactory and stable  Dentition: dentition unchanged  Vital Signs Stable: post-procedure vital signs reviewed and stable  Report to RN Given: handoff report given  Patient transferred to: PACU    Handoff Report: Identifed the Patient, Identified the Reponsible Provider, Reviewed the pertinent medical history, Discussed the surgical course, Reviewed Intra-OP anesthesia mangement and issues during anesthesia, Set expectations for post-procedure period and Allowed opportunity for questions and acknowledgement of understanding      Vitals:  Vitals Value Taken Time   BP 85/50    Temp 36    Pulse 97    Resp 12    SpO2 98        Electronically Signed By: RG Mcdaniel CRNA  July 28, 2022  2:33 PM

## 2022-07-28 NOTE — PROGRESS NOTES
"SPIRITUAL HEALTH SERVICES Progress Note  [place] 4C    Saw pt [facility] per Length of Stay      Illness Narrative - Pt shared her medical challenges, and how she has been in the hospital a few times before for various illnesses.  Pt said that she wants the medical team to \"do whatever they have to do\" to make her feel better.  Pt also shared that she has \"major depression\" and that she often \"doesn't know whether to smile or cry.\" Pt also noted memory issues and said that she sometimes struggles with comprehension or remembering things but tries to hide it from others. Pt said she just recently started talking to a psychiatrist.        Distress - Pt has suffered significant loss in her life, including the death of her partner and father of her two children.  She shared that she feels depressed and sad most of the time, and it is hard for her to be honest about how she is feeling with others.  Pt said she often will joke around or laugh but \"I'm broken inside.\"  Pt said she feels \"messed up\" and that she wants to get better but doesn't know where to start.  Pt noted anxiety while talking to the , and was shaking.   worked with pt to do some breathing exercises to feel calmer.      Coping - Pt said she talks to her mom and her aunt, who are supportive of her, as well as her new psychiatrist.       Meaning-Making - Pt said her children give her meaning and purpose in her life, and she wants to be more active with them as she generally just says in the house with them.  She said they are spending the summer with relatives so she \"can work on herself.\"   explored her goals for improving her physical and mental health, and pt said \"I want to eat healthier and exercise and feel happier.\"       Plan -  will continue to visit and provide support.     Dee Eng  Staff   [phone]    "

## 2022-07-28 NOTE — PLAN OF CARE
Major Shift Events:  NPO since 2100. Pt compliant with bowel prep overnight with GoLytely. Stooling clear, yellow liquid. VSS, remains on RA. No sleep overnight d/t bowel prep. AM labs pending. Hgb stabilizing.     Plan: EGD + Colonoscopy today. Continue to monitor Pt and treat per plan of care.     For vital signs and complete assessments, please see documentation flowsheets.

## 2022-07-29 ENCOUNTER — APPOINTMENT (OUTPATIENT)
Dept: PHYSICAL THERAPY | Facility: CLINIC | Age: 32
End: 2022-07-29
Payer: COMMERCIAL

## 2022-07-29 ENCOUNTER — APPOINTMENT (OUTPATIENT)
Dept: OCCUPATIONAL THERAPY | Facility: CLINIC | Age: 32
End: 2022-07-29
Payer: COMMERCIAL

## 2022-07-29 LAB
ANION GAP SERPL CALCULATED.3IONS-SCNC: 15 MMOL/L (ref 7–15)
BKR LAB AP GYN ADEQUACY: NORMAL
BKR LAB AP GYN INTERPRETATION: NORMAL
BKR LAB AP GYN OTHER FINDINGS: NORMAL
BKR LAB AP HPV REFLEX: NORMAL
BKR LAB AP PREVIOUS ABNORMAL: NORMAL
BUN SERPL-MCNC: 9.8 MG/DL (ref 6–20)
CALCIUM SERPL-MCNC: 7.5 MG/DL (ref 8.6–10)
CHLORIDE SERPL-SCNC: 109 MMOL/L (ref 98–107)
CREAT SERPL-MCNC: 1.44 MG/DL (ref 0.51–0.95)
DEPRECATED HCO3 PLAS-SCNC: 19 MMOL/L (ref 22–29)
ERYTHROCYTE [DISTWIDTH] IN BLOOD BY AUTOMATED COUNT: 18.4 % (ref 10–15)
FOLATE SERPL-MCNC: 24.6 NG/ML (ref 4.6–34.8)
GFR SERPL CREATININE-BSD FRML MDRD: 50 ML/MIN/1.73M2
GLUCOSE BLDC GLUCOMTR-MCNC: 111 MG/DL (ref 70–99)
GLUCOSE BLDC GLUCOMTR-MCNC: 115 MG/DL (ref 70–99)
GLUCOSE BLDC GLUCOMTR-MCNC: 119 MG/DL (ref 70–99)
GLUCOSE BLDC GLUCOMTR-MCNC: 124 MG/DL (ref 70–99)
GLUCOSE BLDC GLUCOMTR-MCNC: 142 MG/DL (ref 70–99)
GLUCOSE SERPL-MCNC: 110 MG/DL (ref 70–99)
HCT VFR BLD AUTO: 24.3 % (ref 35–47)
HGB BLD-MCNC: 7.7 G/DL (ref 11.7–15.7)
HGB BLD-MCNC: 9.5 G/DL (ref 11.7–15.7)
IRON BINDING CAPACITY (ROCHE): NORMAL
IRON SATN MFR SERPL: NORMAL %
IRON SERPL-MCNC: 117 UG/DL (ref 37–145)
MCH RBC QN AUTO: 32.9 PG (ref 26.5–33)
MCHC RBC AUTO-ENTMCNC: 31.7 G/DL (ref 31.5–36.5)
MCV RBC AUTO: 104 FL (ref 78–100)
PATH REPORT.COMMENTS IMP SPEC: NORMAL
PATH REPORT.FINAL DX SPEC: NORMAL
PATH REPORT.GROSS SPEC: NORMAL
PATH REPORT.MICROSCOPIC SPEC OTHER STN: NORMAL
PATH REPORT.RELEVANT HX SPEC: NORMAL
PATH REPORT.RELEVANT HX SPEC: NORMAL
PHOTO IMAGE: NORMAL
PLATELET # BLD AUTO: 312 10E3/UL (ref 150–450)
POTASSIUM SERPL-SCNC: 4 MMOL/L (ref 3.4–5.3)
RBC # BLD AUTO: 2.34 10E6/UL (ref 3.8–5.2)
SODIUM SERPL-SCNC: 143 MMOL/L (ref 136–145)
TRANSFERRIN SERPL-MCNC: 98.1 MG/DL (ref 200–360)
WBC # BLD AUTO: 9.5 10E3/UL (ref 4–11)

## 2022-07-29 PROCEDURE — 36416 COLLJ CAPILLARY BLOOD SPEC: CPT

## 2022-07-29 PROCEDURE — 250N000013 HC RX MED GY IP 250 OP 250 PS 637: Performed by: INTERNAL MEDICINE

## 2022-07-29 PROCEDURE — 97530 THERAPEUTIC ACTIVITIES: CPT | Mod: GP

## 2022-07-29 PROCEDURE — 97162 PT EVAL MOD COMPLEX 30 MIN: CPT | Mod: GP

## 2022-07-29 PROCEDURE — 85027 COMPLETE CBC AUTOMATED: CPT | Performed by: INTERNAL MEDICINE

## 2022-07-29 PROCEDURE — 99233 SBSQ HOSP IP/OBS HIGH 50: CPT | Mod: GC | Performed by: INTERNAL MEDICINE

## 2022-07-29 PROCEDURE — 82310 ASSAY OF CALCIUM: CPT | Performed by: INTERNAL MEDICINE

## 2022-07-29 PROCEDURE — 250N000011 HC RX IP 250 OP 636

## 2022-07-29 PROCEDURE — 999N000157 HC STATISTIC RCP TIME EA 10 MIN

## 2022-07-29 PROCEDURE — 85018 HEMOGLOBIN: CPT

## 2022-07-29 PROCEDURE — 250N000013 HC RX MED GY IP 250 OP 250 PS 637

## 2022-07-29 PROCEDURE — 82746 ASSAY OF FOLIC ACID SERUM: CPT | Performed by: INTERNAL MEDICINE

## 2022-07-29 PROCEDURE — 97530 THERAPEUTIC ACTIVITIES: CPT | Mod: GO

## 2022-07-29 PROCEDURE — 120N000002 HC R&B MED SURG/OB UMMC

## 2022-07-29 PROCEDURE — 97110 THERAPEUTIC EXERCISES: CPT | Mod: GP

## 2022-07-29 PROCEDURE — C9113 INJ PANTOPRAZOLE SODIUM, VIA: HCPCS

## 2022-07-29 PROCEDURE — 94660 CPAP INITIATION&MGMT: CPT

## 2022-07-29 PROCEDURE — 36415 COLL VENOUS BLD VENIPUNCTURE: CPT | Performed by: INTERNAL MEDICINE

## 2022-07-29 PROCEDURE — 250N000013 HC RX MED GY IP 250 OP 250 PS 637: Performed by: STUDENT IN AN ORGANIZED HEALTH CARE EDUCATION/TRAINING PROGRAM

## 2022-07-29 RX ADMIN — PANTOPRAZOLE SODIUM 40 MG: 40 INJECTION, POWDER, FOR SOLUTION INTRAVENOUS at 23:20

## 2022-07-29 RX ADMIN — HYDROXYZINE HYDROCHLORIDE 25 MG: 25 TABLET, FILM COATED ORAL at 14:30

## 2022-07-29 RX ADMIN — Medication 1 TABLET: at 07:58

## 2022-07-29 RX ADMIN — PANTOPRAZOLE SODIUM 40 MG: 40 INJECTION, POWDER, FOR SOLUTION INTRAVENOUS at 00:46

## 2022-07-29 RX ADMIN — ACETAMINOPHEN 650 MG: 325 TABLET, FILM COATED ORAL at 16:43

## 2022-07-29 RX ADMIN — PREGABALIN 200 MG: 100 CAPSULE ORAL at 07:58

## 2022-07-29 RX ADMIN — APIXABAN 5 MG: 5 TABLET, FILM COATED ORAL at 20:51

## 2022-07-29 RX ADMIN — APIXABAN 5 MG: 5 TABLET, FILM COATED ORAL at 07:58

## 2022-07-29 RX ADMIN — METHOCARBAMOL 1000 MG: 500 TABLET ORAL at 20:59

## 2022-07-29 RX ADMIN — GLYCERIN, PETROLATUM, PHENYLEPHRINE HCL, PRAMOXINE HCL: 144; 2.5; 10; 15 CREAM TOPICAL at 20:51

## 2022-07-29 RX ADMIN — ACETAMINOPHEN 650 MG: 325 TABLET, FILM COATED ORAL at 20:59

## 2022-07-29 RX ADMIN — METHOCARBAMOL 1000 MG: 500 TABLET ORAL at 12:24

## 2022-07-29 RX ADMIN — GLYCERIN, PETROLATUM, PHENYLEPHRINE HCL, PRAMOXINE HCL: 144; 2.5; 10; 15 CREAM TOPICAL at 16:20

## 2022-07-29 RX ADMIN — PANTOPRAZOLE SODIUM 40 MG: 40 INJECTION, POWDER, FOR SOLUTION INTRAVENOUS at 11:50

## 2022-07-29 RX ADMIN — ACETAMINOPHEN 650 MG: 325 TABLET, FILM COATED ORAL at 12:24

## 2022-07-29 RX ADMIN — HYDROXYZINE HYDROCHLORIDE 25 MG: 25 TABLET, FILM COATED ORAL at 08:11

## 2022-07-29 RX ADMIN — ACETAMINOPHEN 650 MG: 325 TABLET, FILM COATED ORAL at 00:47

## 2022-07-29 RX ADMIN — METHOCARBAMOL 1000 MG: 500 TABLET ORAL at 07:58

## 2022-07-29 RX ADMIN — ACETAMINOPHEN 650 MG: 325 TABLET, FILM COATED ORAL at 07:57

## 2022-07-29 RX ADMIN — HYDROXYZINE HYDROCHLORIDE 25 MG: 25 TABLET, FILM COATED ORAL at 00:47

## 2022-07-29 RX ADMIN — VENLAFAXINE HYDROCHLORIDE 225 MG: 150 CAPSULE, EXTENDED RELEASE ORAL at 07:58

## 2022-07-29 RX ADMIN — PREGABALIN 200 MG: 100 CAPSULE ORAL at 20:51

## 2022-07-29 RX ADMIN — PREGABALIN 200 MG: 100 CAPSULE ORAL at 16:19

## 2022-07-29 RX ADMIN — FOLIC ACID 1 MG: 1 TABLET ORAL at 07:58

## 2022-07-29 ASSESSMENT — ACTIVITIES OF DAILY LIVING (ADL)
ADLS_ACUITY_SCORE: 47
ADLS_ACUITY_SCORE: 48
DEPENDENT_IADLS:: INDEPENDENT
ADLS_ACUITY_SCORE: 48
ADLS_ACUITY_SCORE: 47
ADLS_ACUITY_SCORE: 48
ADLS_ACUITY_SCORE: 47
ADLS_ACUITY_SCORE: 48

## 2022-07-29 NOTE — PROVIDER NOTIFICATION
[facility] 1 Resident KHUSHI Rivas MD paged #[phone] Pt transferred to 7D cardiac monitoring not done on this PCU, pls d/c or order appropriate order.  Thanks    Awaiting respone      Order changed to med surg cardiac monitoring.

## 2022-07-29 NOTE — PROGRESS NOTES
Transferred to: 7D at 1340  Status at time of transfer: Stable, transferred in wheelchair  Belongings: Purse, boot, books, phone, phone , and all other belongings sent with patient.  Gonzales removed? (if no, why?): N/A  Chart and medications: Sent with patient  Family notified: Pt informed me that she notified her sister and mom via her own phone.     ICU End of Shift Summary. See flowsheets for vital signs and detailed assessment.    Changes this shift: Tachycardic with activity. Pt up to chair and walking around room with assist x1. Rechecked hgb. Pt complaining of 8/10 pain, team aware, managed with tylenol and robaxin prns.

## 2022-07-29 NOTE — PLAN OF CARE
Goal Outcome Evaluation:    Plan of Care Reviewed With: patient     Overall Patient Progress: improving    Outcome Evaluation: Transfer to Med/Surge.    ICU End of Shift Summary. See flowsheets for vital signs and detailed assessment.    Changes this shift: No acute changes overnight. Alert and oriented x4. Vitals stable on room air. Ate nearly entire meal tray, good appetite. Voiding to bedside commode. No stool overnight.     Plan: Transfer to med/surge when there is a bed available. Continue to monitor and report any changes to team.

## 2022-07-29 NOTE — PROGRESS NOTES
"SPIRITUAL HEALTH SERVICES  SPIRITUAL ASSESSMENT Progress Note  [facility] 4C     REFERRAL SOURCE: Follow up    Pt was receptive to 's return visit.  Pt processed having her procedures yesterday, and shared she is eager to hear the plan from the medical team moving forward.  Pt talked about her other illnesses and health challenges, including a significant health scare in 2018.  Pt also shared her personal theology and connection to God, which does not involve going to Jainism.  Pt had a flat affect when talking to , and said that today her mood was \"all over the place,\" but could not articulate what that meant because she said \"I haven't really started my day yet.\"  Pt said that she had talked to her kids yesterday and was grateful they were having fun and were taken care of.   explored coping strategies and sources of strength and meaning with patient.   let pt know that [facility] would continue to be available for her throughout her hospitalization should she want to talk again.     PLAN: [facility] will remain available for support     Dee Simpson  Pager: [phone]    "

## 2022-07-29 NOTE — PLAN OF CARE
Goal Outcome Evaluation:    Plan of Care Reviewed With: patient     Overall Patient Progress: no change    [id number]:  Pt transferred to 7D PCU from  with personal belongings.  Report received from Rebekah CAAL on 4C.  Stable.  Afebrile. Soft BP 91/54.  Denied chest pain and SOB.  Given PRN Atarax for anxiety. Worked with OT.  Pt would like PRN Tylenol when available per EMAR.  Up with assist of one.  Up in recliner, chair alarm on.  Continued to monitor and with POC.

## 2022-07-29 NOTE — PLAN OF CARE
"Tachy 117, OVSS, Pt c/o burning sensation when eating \"feels like my taste buds are burning off and I can't taste the food\", assessed pt's mouth and saw some raised bumps towards back of tongue, otherwise color looks normal, spoke w Dr. Unger and she is aware, says she will assess in the AM and order magic mouthwash for now. C/o b/l pain in UE. Prn tylenol given x1 w/ some relief, robaxin x1 as well. Only ate one small piece of pizza for Dinner d/t pain. Additinally, informed Dr. Unger that there are unceconciled transfer orders and old signed and helds and she said she would clean up orders later this kunal. BG ACHS, this shift at Dinner: 111 at HS: 115  On tele, sinus tachy. On carb cts; ate 26 CHO for Dinner.  "

## 2022-07-29 NOTE — PROGRESS NOTES
[facility]    Progress Note - Medicine Service, MAROON TEAM 1       Date of Admission:  7/26/2022    Assessment & Plan        Hilaria Bonner is a 31 year old female admitted on 7/26/2022. She has a history of HTN, T2DM, alcohol use, and prior PE c/b subsequent PEA arrest on Xarelto, fatty liver disease and anxiety who is admitted for hypotensive shock secondary to likely GI bleed.     Changes today:  -Transferred out of ICU  -Hgb stable on afternoon recheck.    -Mild sinus tachycardia at rest w/ peaks in 150's with ambulation. Asymptomatic and otherwise stable. No arrhythmias on tele.   -Obtained outside records from [facility] regarding pt's R foot injury. Ortho curbsided regarding XR--no further workup/interventions needed    #GI bleed  #Acute anemia 2/2 blood loss vs. Anemia of chronic disease  #Hypovolemic shock 2/2 blood loss vs. dehydration  P/w 3 weeks of hematochezia with associated lightheadedness and fatigue while on Xarelto. Hypotensive with improvement after repeat LR boluses. Baseline hgb wnl, on arrival 6.4, improved to 7.8 after 2 U pRBC. CTA abd/pelvis negative for active extravasation. Infectious stool panel negative. EGD/Colonoscopy performed 7/28 without evidence of acute bleed. Hgb dropped to 7 this AM but was 9.3 on repeat 4 hours later (suspect AM lab was inaccurate result). MCV indicates a more macrocytic type anemia. B12 and folic acid levels normal. Iron studies inconclusive but appear to be more of an anemia of chronic disease picture. However, given her hematochezia, suspicion still high for AVM vs recent diverticular bleed as source of anemia. Hgb currently stable without any new signs/sxs of bleeding.   -Started on apixaban 7/28. Continue to hold PTA Xarelto   -Will recheck Hgb in AM, transfuse if hgb<7. Will consider hematology consult if Hgb starts to downtrend again  -Continue to bolus LR PRN for hypotension    #. Sinus tachycardia, likely  2/2 hypovolemia   HR at rest ~110 but shailesh to 150's with ambulation to and from commode today. Pt denies SOB, chest pain, or palpitations. Says that she chronically has tachycardia. Sinus rhythm on cardiac monitoring. No evidence for infection on exam and white count normal. Cannot rule out PE (especially given pt's hx) but pt satting well on room air and is already on anticoagulation. Suspect tachycardia 2/2 dehydration in the setting of ARAMIS, hypotension, and lactic acidosis     #ARAMIS - improving  Pt presented with creatinine elevated to 2.18 on admission with baseline at 0.7-0.8. Likely due to prerenal hypotension in setting of ?GI bleed vs. dehydration. Improving with pRBC and fluid resuscitation. Creatinine this morning was 1.44.    -Continue to bolus LR PRN for hypotension  -Continue to monitor with AM BMP    #Lactic acidosis - improving   Bicarb improved from 19 to 16 with volume resuscitation. Suspect contribution of elevated lactate (6/7 on admit, now down to 2.2), hypotension (resolving) and anemia (transfused).   - trend BMP    Chronic Medical Problems  #Hx of PE c/b PEA arrest in 2018  -PTA Xarelto held on arrival 2/2 concern for GI bleed. Started on apixaban 7/28 after bleed not found on EGD/C   -ECHO showed normal global and regional left ventricular function with EF of 55-60%. No changes compared to previous study.  -On Tele 2/2 tachycardia. Will continue to monitor vitals    #Chronic peripheral neuropathy  -PTA Pregabalin     #Type 2 diabetes  A1c on admission 5.4. Glucose has been in 80s since arrival.   -Hold PTA metformin  -monitor on BMP daily, will increase frequency if needed    #Chronic anxiety  -Continue PTA Venlafaxine  -Hold Zyprexa due to low BP    #Obstructive Sleep Apnea  -CPAP at night    #Chronic hypertension  -Hold PTA Lisinopril, hydrochlorothiazide, and Prazosin while normotensive and intermittently hypotensive.     #R foot injury   Tripped and fell from standing height on 6/29,  "inverting her R ankle. Seen at [facility] in [place] 6/30. R ankle XRs negative; R foot XRs with ? Calcaneal fracture at the calcaneal cuboid joint. Given CAM boot, which she has been wearing since. Continued to have R foot pain on admission. Repeat R foot XR without calcaneal fracture but there are ossific fragment projections over the proximal second metatarsal.  -Ortho curbsided regarding imaging. No further workup or intervention recommended.   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 Appears pt has been following with neurology. Sx onset April 2020 4 weeks post-covid. NCS in 7/20 showed absent sensory responses in the upper and lower limbs and normal motor responses. Has been on maintenance IVIG q3 weeks (recently decreased to [id number]) since then and sx managed well with this per last neuro note 5/9/22.  - NTD       Diet: Regular Diet Adult    DVT Prophylaxis: VTE Prophylaxis contraindicated due to GI bleed  Gonzales Catheter: Not present  Fluids: LR fluid bolus  Central Lines: None  Cardiac Monitoring: ACTIVE order. Indication: sinus tachydardia with h/o PE  Code Status: Full Code      The patient's care was discussed with the Attending Physician, Dr. Unger and Patient.    Jordyn Rivas DO  Medicine Service, [facility] TEAM [address] [place] [address]  Securely message with the Vocera Web Console (learn more here)  Text page via [facility] Paging/Directory   Please see signed in provider for up to date coverage information      Clinically Significant Risk Factors Present on Admission                # Severe Obesity: Estimated body mass index is 44.9 kg/m  as calculated from the following:    Height as of an earlier encounter on 7/26/22: 1.689 m (5' 6.5\").    Weight as of this encounter: 128.1 kg (282 lb 6.4 oz).    "     ______________________________________________________________________    Interval History   No acute events overnight. Had one BM this AM that was non bloody.   Tachycardic this AM with resting BP around 110 and increases to 150's on ambulation (per RN). Pt says that her HR runs fast and that is normal for her. Denies chest pain, palpitations, or shortness of breath. No dizziness or lightheadedness. No new bleeding.     When asked about her R foot. Patient says she injured it a few weeks ago and went to [facility] in [place]. Had an XR and was told she fractured her foot. Given CAM boot to wear, which she brought with her on admission. Has been wearing it for ambulation. Using walker as putting full weight on her foot hurts. Does not remember name of provider she saw at [facility] or their follow up recommendations or weight bearing instructions.     Data reviewed today: I reviewed all medications, new labs and imaging results over the last 24 hours.  Physical Exam   Vital Signs: Temp: 97  F (36.1  C) Temp src: Oral BP: 91/54 Pulse: 99   Resp: 18 SpO2: 100 % O2 Device: None (Room air)    Weight: 282 lbs 6.4 oz  General Appearance: No acute distress  Respiratory: clear lung sounds, no increased work of breathing  Cardiovascular: mildly tachycardic with regular rhythm   GI: Non distended  Other: Alert and awake     Data   Recent Labs   Lab 07/29/22  1147 07/29/22  0856 07/29/22  0811 07/29/22  0454 07/28/22  1934 07/28/22  1649 07/28/22  0907 07/28/22  0637 07/27/22  0815 07/27/22  0457 07/26/22  1331 07/26/22  1320 07/26/22  1143 07/26/22  1143   WBC  --   --   --  9.5  --   --   --  11.9*  --  12.8*   < > 12.4*  --  12.1*   HGB  --  9.5*  --  7.7*  --  9.3*  --  10.2*   < > 8.1*   < > 6.4*  --  6.5*   MCV  --   --   --  104*  --   --   --  105*  --  99   < > 110*  --  113*   PLT  --   --   --  312  --   --   --  340  --  222   < > 405  --  431   INR  --   --   --   --   --   --   --   --   --   --   --  1.95*  --   --    NA   --   --   --  143  --   --   --  135*  --  138   < > 137  --  137   POTASSIUM  --   --   --  4.0  --   --   --  4.1  --  4.8   < > 4.2   < > 4.3   CHLORIDE  --   --   --  109*  --   --   --  100  --  105  --  101   < > 103   CO2  --   --   --  19*  --   --   --  18*  --  17*  --  17*   < > 18*   BUN  --   --   --  9.8  --   --   --  11.5  --  17.9  --  19.5   < > 20   CR  --   --   --  1.44*  --   --   --  1.60*  --  2.00*  --  2.14*  --  2.18*   ANIONGAP  --   --   --  15  --   --   --  17*  --  16*  --  19*   < > 16*   QUINCY  --   --   --  7.5*  --   --   --  8.0*  --  7.6*  --  7.9*  --  7.9*   *  --  119* 110*   < >  --    < > 123*   < > 71   < > 132*   < > 161*   ALBUMIN  --   --   --   --   --   --   --   --   --   --   --  2.4*  --  1.7*   PROTTOTAL  --   --   --   --   --   --   --   --   --   --   --  5.9*  --  6.3*   BILITOTAL  --   --   --   --   --   --   --   --   --   --   --  2.6*  --  2.6*   ALKPHOS  --   --   --   --   --   --   --   --   --   --   --  283*  --  283*   ALT  --   --   --   --   --   --   --   --   --   --   --  123*  --  134*   AST  --   --   --   --   --   --   --   --   --   --   --   --   --  221*    < > = values in this interval not displayed.

## 2022-07-29 NOTE — PROGRESS NOTES
07/29/22 1016   Quick Adds   Type of Visit Initial PT Evaluation   Living Environment   People in Home child(lavell), dependent   Current Living Arrangements apartment   Home Accessibility stairs to enter home   Number of Stairs, Main Entrance 8   Stair Railings, Main Entrance railing on right side (ascending)   Transportation Anticipated car, drives self;public transportation   Living Environment Comments Pt lives in an apartment with her 7yo and 13 yo children. pt has 8 stairs descending from main entrance to reach level of apartment. pt reports there are other family and friends that can help out 'anytime if they arent busy' but does not elaborate further. Her kids are currently on vacation.   Self-Care   Usual Activity Tolerance fair   Current Activity Tolerance fair   Regular Exercise No   Equipment Currently Used at Home shower chair;walker, rolling  (has a script for a 4WW but has not obtained)   Fall history within last six months yes   Number of times patient has fallen within last six months   ('about 8')   Activity/Exercise/Self-Care Comment pt reports utilizing a FWW and shower chair at baseline. has a script for a 4WW but has not obtained yet   General Information   Onset of Illness/Injury or Date of Surgery 07/28/22   Referring Physician Luan Bey MD   Patient/Family Therapy Goals Statement (PT) 'to improve activity tolerance'   Pertinent History of Current Problem (include personal factors and/or comorbidities that impact the POC) Hilaira Bonner is a 31 year old female admitted on 7/26/2022. She has a history of HTN, T2DM, alcohol use, and prior PE c/b subsequent PEA arrest on Xarelto, fatty liver disease and anxiety who is admitted for hypotensive shock secondary to likely GI bleed.   Existing Precautions/Restrictions fall;spinal  (per eval order has hx of compression fractures)   Weight-Bearing Status - RLE weight-bearing as tolerated  (w/ cam boot)   General Observations TEN cherry  activity, per MD notes is OK to bear weight   Cognition   Orientation Status (Cognition) oriented x 4   Integumentary/Edema   Integumentary/Edema Comments well healed scar on R knee from prior surgery   Posture    Posture Forward head position;Protracted shoulders   Range of Motion (ROM)   ROM Comment AROM limited, PROM intact in BLE. due to body habitus while in chair   Strength (Manual Muscle Testing)   Strength Comments BLE >3/5 per observation   Bed Mobility   Comment, (Bed Mobility) did not observe, pt declines transfer back into bed, per report was SBA to complete   Transfers   Comment, (Transfers) SBA sit <> stand transfers from chair, relies on BUE push off. HR up to 130s quickly from 100s with transfer   Gait/Stairs (Locomotion)   Comment, (Gait/Stairs) short distance gait with FWW and SBA, mild instability 2/2 cam boot on RLE and no shoe on LLE. limited tolerance   Balance   Balance Comments SBA-CGA in standing 2/2 instability related to Cam boot vs no shoe   Sensory Examination   Sensory Perception Comments reports N/T in BLE as well as increased pain in R knee due to arthritis.   Clinical Impression   Criteria for Skilled Therapeutic Intervention Yes, treatment indicated   PT Diagnosis (PT) impaired functional mobility   Influenced by the following impairments weakness, fatigue, deconditioning, RLE fx   Functional limitations due to impairments limited tolerance and performance of gait, transfers and stairs   Clinical Presentation (PT Evaluation Complexity) Evolving/Changing   Clinical Presentation Rationale pt presentation, clinical reasoning   Clinical Decision Making (Complexity) moderate complexity   Planned Therapy Interventions (PT) balance training;bed mobility training;gait training;home exercise program;neuromuscular re-education;stair training;strengthening;transfer training;home program guidelines   Anticipated Equipment Needs at Discharge (PT)   (tbd)   Risk & Benefits of therapy have been  explained evaluation/treatment results reviewed;care plan/treatment goals reviewed;risks/benefits reviewed;current/potential barriers reviewed;participants voiced agreement with care plan;patient;participants included   Clinical Impression Comments pt presents with decreased functional status related to weakness and deconditioning, per chart and discussionw ith pt, suspect mental health also is a limiting factor. will benefit from skilled PT to address deficits   PT Discharge Planning   PT Discharge Recommendation (DC Rec) Transitional Care Facility   PT Rationale for DC Rec pt will benefit from TCU stay to improve functioanl status. currently not safe for return to home setting. pt initially not receptive to TCU recommendations, if continues to decline this would then require 24 hr assist 2/2 mobility status as well as HH therapies.   PT Brief overview of current status A x 1 sit <> stand and pivots. monitor HR response to activity   Plan of Care Review   Plan of Care Reviewed With patient   Total Evaluation Time   Total Evaluation Time (Minutes) 7   Physical Therapy Goals   PT Frequency 5x/week   PT Predicted Duration/Target Date for Goal Attainment 08/09/22   PT Goals Bed Mobility;Transfers;Gait;Stairs   PT: Bed Mobility Independent;Rolling;Bridging   PT: Transfers Independent;Sit to/from stand;Bed to/from chair   PT: Gait Modified independent;100 feet;Rolling walker   PT: Stairs 8 stairs;Supervision/stand-by assist;Rail on left  (L rail descending)

## 2022-07-29 NOTE — CONSULTS
Care Management Initial Consult    General Information  Assessment completed with: Care Team Member, chart review  Type of CM/SW Visit: Initial Assessment  Primary Care Provider verified and updated as needed: Yes   Readmission within the last 30 days: no previous admission in last 30 days   Reason for Consult: other - Elevated Readmission Risk Score  Advance Care Planning Reviewed: no concerns identified        Communication Assessment  Patient's communication style: spoken language (English or Bilingual)    Hearing Difficulty or Deaf: no   Wear Glasses or Blind: yes    Cognitive  Cognitive/Neuro/Behavioral: .WDL except, orientation        Orientation: oriented x 4             Living Environment:   People in home: child(lavell), dependent     Current living Arrangements: apartment      Able to return to prior arrangements: yes     Family/Social Support:  Care provided by: self  Provides care for: child(lavell)  Marital Status:   Support System: Parent(s), Sibling(s), Children          Description of Support System: Supportive, Involved    Support Assessment: Adequate family and caregiver support    Current Resources:   Patient receiving home care services: No  Community Resources: OP Mental Health  Equipment currently used at home: shower chair, walker, rolling  Supplies currently used at home: None    Employment/Financial:  Employment Status: employed full-time     Financial Concerns: No concerns identified   Referral to Financial Worker: No       Lifestyle & Psychosocial Needs:  Social Determinants of Health     Tobacco Use: High Risk     Smoking Tobacco Use: Current Every Day Smoker     Smokeless Tobacco Use: Never Used   Alcohol Use: Not on file   Financial Resource Strain: Low Risk      Difficulty of Paying Living Expenses: Not very hard   Food Insecurity: Food Insecurity Present     Worried About Running Out of Food in the Last Year: Sometimes true     Ran Out of Food in the Last Year: Sometimes true    Transportation Needs: No Transportation Needs     Lack of Transportation (Medical): No     Lack of Transportation (Non-Medical): No   Physical Activity: Not on file   Stress: Not on file   Social Connections: Not on file   Intimate Partner Violence: Not on file   Depression: At risk     PHQ-2 Score: 6   Housing Stability: Not on file       Functional Status:  Prior to admission patient needed assistance:   Dependent ADLs:: Ambulation-walker  Dependent IADLs:: Independent  Assesssment of Functional Status: Not at baseline with ADL Functioning    Mental Health Status:  Mental Health Status: Current Concern  Mental Health Management: Medication, Individual Therapy    Chemical Dependency Status:  Chemical Dependency Status: No Current Concerns           Values/Beliefs:  Spiritual, Cultural Beliefs, [employer] Practices, Values that affect care: no               Additional Information:    Patient is a 31 year old female admitted on 7/26/2022. She has a history of HTN, T2DM, alcohol use, and prior PE c/b subsequent PEA arrest on Xarelto, fatty liver disease and anxiety who is admitted for hypotensive shock secondary to likely GI bleed.    PT/OT recommending TCU placement at time of discharge. Patient is dependent with walker at baseline.    Patient is not currently open to any in home supports or services. Patient utilizes the [facility] and [facility] for Counseling Services.     [facility]/ will continue to follow and assist with any discharge planning needs as they arise.     JESSICA Zambrano  7D/5C Hematology/Oncology Social Worker  Phone: [phone]  Pager: [phone]  [email or web address]

## 2022-07-30 LAB
ANION GAP SERPL CALCULATED.3IONS-SCNC: 14 MMOL/L (ref 7–15)
BUN SERPL-MCNC: 9 MG/DL (ref 6–20)
CALCIUM SERPL-MCNC: 7.4 MG/DL (ref 8.6–10)
CHLORIDE SERPL-SCNC: 107 MMOL/L (ref 98–107)
CREAT SERPL-MCNC: 1.53 MG/DL (ref 0.51–0.95)
DEPRECATED HCO3 PLAS-SCNC: 17 MMOL/L (ref 22–29)
ERYTHROCYTE [DISTWIDTH] IN BLOOD BY AUTOMATED COUNT: 17.6 % (ref 10–15)
GFR SERPL CREATININE-BSD FRML MDRD: 46 ML/MIN/1.73M2
GLUCOSE BLDC GLUCOMTR-MCNC: 102 MG/DL (ref 70–99)
GLUCOSE BLDC GLUCOMTR-MCNC: 126 MG/DL (ref 70–99)
GLUCOSE BLDC GLUCOMTR-MCNC: 127 MG/DL (ref 70–99)
GLUCOSE BLDC GLUCOMTR-MCNC: 163 MG/DL (ref 70–99)
GLUCOSE BLDC GLUCOMTR-MCNC: 174 MG/DL (ref 70–99)
GLUCOSE SERPL-MCNC: 118 MG/DL (ref 70–99)
HCT VFR BLD AUTO: 23.4 % (ref 35–47)
HGB BLD-MCNC: 7.4 G/DL (ref 11.7–15.7)
HGB BLD-MCNC: 7.5 G/DL (ref 11.7–15.7)
HGB BLD-MCNC: 8.5 G/DL (ref 11.7–15.7)
MCH RBC QN AUTO: 33.3 PG (ref 26.5–33)
MCHC RBC AUTO-ENTMCNC: 31.6 G/DL (ref 31.5–36.5)
MCV RBC AUTO: 105 FL (ref 78–100)
PLATELET # BLD AUTO: 270 10E3/UL (ref 150–450)
POTASSIUM SERPL-SCNC: 3.9 MMOL/L (ref 3.4–5.3)
RBC # BLD AUTO: 2.22 10E6/UL (ref 3.8–5.2)
SODIUM SERPL-SCNC: 138 MMOL/L (ref 136–145)
WBC # BLD AUTO: 9.3 10E3/UL (ref 4–11)

## 2022-07-30 PROCEDURE — 258N000003 HC RX IP 258 OP 636

## 2022-07-30 PROCEDURE — 82310 ASSAY OF CALCIUM: CPT

## 2022-07-30 PROCEDURE — 250N000013 HC RX MED GY IP 250 OP 250 PS 637: Performed by: INTERNAL MEDICINE

## 2022-07-30 PROCEDURE — 250N000011 HC RX IP 250 OP 636

## 2022-07-30 PROCEDURE — 99233 SBSQ HOSP IP/OBS HIGH 50: CPT | Mod: GC | Performed by: INTERNAL MEDICINE

## 2022-07-30 PROCEDURE — 250N000013 HC RX MED GY IP 250 OP 250 PS 637

## 2022-07-30 PROCEDURE — 94660 CPAP INITIATION&MGMT: CPT

## 2022-07-30 PROCEDURE — 36415 COLL VENOUS BLD VENIPUNCTURE: CPT

## 2022-07-30 PROCEDURE — 85027 COMPLETE CBC AUTOMATED: CPT

## 2022-07-30 PROCEDURE — 85018 HEMOGLOBIN: CPT

## 2022-07-30 PROCEDURE — 120N000002 HC R&B MED SURG/OB UMMC

## 2022-07-30 PROCEDURE — C9113 INJ PANTOPRAZOLE SODIUM, VIA: HCPCS

## 2022-07-30 PROCEDURE — 250N000013 HC RX MED GY IP 250 OP 250 PS 637: Performed by: STUDENT IN AN ORGANIZED HEALTH CARE EDUCATION/TRAINING PROGRAM

## 2022-07-30 RX ORDER — OLANZAPINE 10 MG/1
10 TABLET ORAL AT BEDTIME
Status: DISCONTINUED | OUTPATIENT
Start: 2022-07-30 | End: 2022-08-11 | Stop reason: HOSPADM

## 2022-07-30 RX ADMIN — PREGABALIN 200 MG: 100 CAPSULE ORAL at 20:33

## 2022-07-30 RX ADMIN — GLYCERIN, PETROLATUM, PHENYLEPHRINE HCL, PRAMOXINE HCL: 144; 2.5; 10; 15 CREAM TOPICAL at 14:43

## 2022-07-30 RX ADMIN — PREGABALIN 200 MG: 100 CAPSULE ORAL at 13:49

## 2022-07-30 RX ADMIN — Medication 1 TABLET: at 07:58

## 2022-07-30 RX ADMIN — METHOCARBAMOL 1000 MG: 500 TABLET ORAL at 08:04

## 2022-07-30 RX ADMIN — SODIUM CHLORIDE, POTASSIUM CHLORIDE, SODIUM LACTATE AND CALCIUM CHLORIDE 1000 ML: 600; 310; 30; 20 INJECTION, SOLUTION INTRAVENOUS at 16:39

## 2022-07-30 RX ADMIN — OLANZAPINE 10 MG: 10 TABLET, FILM COATED ORAL at 22:04

## 2022-07-30 RX ADMIN — WITCH HAZEL: 500 SOLUTION RECTAL; TOPICAL at 12:31

## 2022-07-30 RX ADMIN — PANTOPRAZOLE SODIUM 40 MG: 40 INJECTION, POWDER, FOR SOLUTION INTRAVENOUS at 12:21

## 2022-07-30 RX ADMIN — ACETAMINOPHEN 650 MG: 325 TABLET, FILM COATED ORAL at 08:04

## 2022-07-30 RX ADMIN — HYDROCORTISONE ACETATE 25 MG: 25 SUPPOSITORY RECTAL at 14:43

## 2022-07-30 RX ADMIN — GLYCERIN, PETROLATUM, PHENYLEPHRINE HCL, PRAMOXINE HCL: 144; 2.5; 10; 15 CREAM TOPICAL at 07:01

## 2022-07-30 RX ADMIN — ACETAMINOPHEN 650 MG: 325 TABLET, FILM COATED ORAL at 13:49

## 2022-07-30 RX ADMIN — WITCH HAZEL: 500 SOLUTION RECTAL; TOPICAL at 20:33

## 2022-07-30 RX ADMIN — WITCH HAZEL: 500 SOLUTION RECTAL; TOPICAL at 16:39

## 2022-07-30 RX ADMIN — FOLIC ACID 1 MG: 1 TABLET ORAL at 07:58

## 2022-07-30 RX ADMIN — METHOCARBAMOL 1000 MG: 500 TABLET ORAL at 15:56

## 2022-07-30 RX ADMIN — PREGABALIN 200 MG: 100 CAPSULE ORAL at 07:58

## 2022-07-30 RX ADMIN — METHOCARBAMOL 1000 MG: 500 TABLET ORAL at 22:04

## 2022-07-30 RX ADMIN — APIXABAN 5 MG: 5 TABLET, FILM COATED ORAL at 07:59

## 2022-07-30 RX ADMIN — VENLAFAXINE HYDROCHLORIDE 225 MG: 150 CAPSULE, EXTENDED RELEASE ORAL at 07:59

## 2022-07-30 RX ADMIN — GLYCERIN, PETROLATUM, PHENYLEPHRINE HCL, PRAMOXINE HCL: 144; 2.5; 10; 15 CREAM TOPICAL at 20:34

## 2022-07-30 RX ADMIN — PSYLLIUM HUSK 1 PACKET: 3.4 POWDER ORAL at 19:05

## 2022-07-30 ASSESSMENT — ACTIVITIES OF DAILY LIVING (ADL)
ADLS_ACUITY_SCORE: 48
ADLS_ACUITY_SCORE: 47
ADLS_ACUITY_SCORE: 48
ADLS_ACUITY_SCORE: 47
ADLS_ACUITY_SCORE: 47
ADLS_ACUITY_SCORE: 48

## 2022-07-30 NOTE — PROGRESS NOTES
GI Progress Note  Date of Service:  7/30/2022      Assessment:   Hematochezia  Hemorrhoids   HTN, Type 2 DM  History of Massive PE with PEA and  need for lifelong anticoagulation    31 yr old female with above co morbids who initially presented with maroon colored stools and symptomatic anemia with Hb of 6.4 to the ER. She is s/p 2 pRBC tranfusion since admission    Underwent EGD and colonoscopy on 7/26. EGD with evidence of hiatal hernia, negative for any active source of bleeding,  celiac biopsies obtained. Colonoscopy was negative for source of bleeding, there was evidence of external hemorrhoids on perianal exam.    GI follow up requested today for reports of rectal bleeding overnight. Pt  Is stable hemodynamically. BUN / Cr within limits, no concern for any upper GI source of bleeding. Rectal bleeding has resolved without any intervention.  Overall, clinical picture suggestive of bleeding from external hemorrhoids in the setting of anticoagulation.     Recommendations:   No further endoscopic interventions needed from GI point  Colorectal surgery evaluation for Rx of hemorrhoids considering that she had symptomatic anemia and also will be needing lifelong anticoagulation.   GI will sign off at this time.    Patient was discussed with Dr. Maile Christianson MD   Fellow   Gastroenterology & Hepatology     __________________________________________________________________________________  Subjective:  Nursing notes reviewed and noted.  Pt reportedly had bright red bleeding per rectum with defecation.  She was unaware of this but RN noticed stool to be bright red and reported this to her.   Denies any abd pain/nausea or vomiting  No light headedness or dizziness      Physical Examination:  Vital Signs:  /81 (BP Location: Right arm)   Pulse 68   Temp 97.1  F (36.2  C) (Oral)   Resp 18   Wt 129.3 kg (285 lb 0.9 oz)   LMP  (LMP Unknown)   SpO2 96%   BMI 45.32 kg/m       General:  NAD. Resting  comfortably.  HEENT:  Sclera anicteric  Chest:  Non labored breathing   Cardiovascular: RRR.  Abdomen:  Non distended  Skin:  No rashes  Neurological:  Alert, oriented, following commands    DATA:  Labs:    Reviewed and noted

## 2022-07-30 NOTE — PLAN OF CARE
A&Ox4. VSS on RA. Endorses neuropathy type pain in extremities. Received PRN robaxin once and PRN tylenol twice. Up Ax1 with walker, gb. Uses BSC. Voids adequately. BM today with red streaks. Has hemorrhoids. Started on Preparation H, Tucks pads, and hydrocortisone suppository. Hgb on previous shift 7.4, recheck 8.5. Tele discontinued. Tachy with activity.  and 127. Good appetite. Ate 100% both meals. PIV x 2, both SL. Mother visited. Patient on phone and watching television between cares.     Goal Outcome Evaluation:    Plan of Care Reviewed With: patient     Overall Patient Progress: no change

## 2022-07-30 NOTE — PLAN OF CARE
[id number]     Tachy, OVSS on RA. Tele monitoring continued. Denies pain, nausea, and SOB. Preparation H cream utilized x1. Pt expresses concern due to blood in stool this AM, provider notified. No acute events this shift. Continue with plan of care.

## 2022-07-30 NOTE — PROVIDER NOTIFICATION
"Provider Notification      Wen Patel (#[id number]) paged at [phone]:  \"FYI pt had significant amount of blood in stool just now. About 200 mL. Hgb yesterday was 9.5, dropped to 7.4 today. Thanks.\"     No response given. No interventions ordered.       Will continue to monitor.   "

## 2022-07-30 NOTE — CONSULTS
Colon Rectal Surgery Consultation    Hilaria Bonner MRN# [id number]   Age: 31 year old YOB: 1990     Date of Admission:  7/26/2022    Date of Consult:   7/26/22    Reason for consult: Bleeding hemorrhoids        Requesting service: Medicine; requesting provider: Dr. Rivas           Assessment and Plan:   Assessment:  31 year old female with PMHx of HTN, T2DM, alcohol use, prior PE c/b subsequent PEA arrest (2018) on Xarelto, hx of hemorrhoids who was admitted on 7/26/2022 for hypotensive shock likely 2/2 gastrointestinal bleed. She was admitted to the MICU for resuscitation and received 2u pRBCs. Workup has been negative for clear source of bleeding, but her colonoscopy with GI did identified hemorrhoids. Additional episode of hematochezia today so colorectal consulted. Clinical picture is consistent with presumed hemorrhoidal bleeding. Hemodynamically stable without ongoing transfusion needs. Denies symptoms of lightheadedness/dyspnea or further bloody BMs.     Plan:  - No urgent colorectal interventions given hemodynamic stability and no need for additional transfusions   - Will plan for inpatient hemorrhoidectomy mid week  - Cardiology consult for pre-operative clearance  - Continue to hold apixaban until hemorrhoidectomy   - Optimize medical management        - Increase water intake       - Start psyllium daily       - Decrease caffeine intake to 1-2 cups/day (coffee, sodas, teas, etc)       - Toilet hygiene (< 10 min on toilet)  - Would recommend stopping anusol suppositories  - Will arrange outpatient follow up after discharge     Patient discussed with fellow, Dr. Patel, and staff, Dr. Way.    Fabio Gaona MD (PGY-2)  General Surgery Resident         Chief Complaint:     Hemorrhoids         History of Present Illness:     31 year old female with PMHx of HTN, T2DM, prior PE c/b subsequent PEA arrest on Xarelto, gastric sleeve, fatty liver disease and anxiety who was admitted on  7/26/2022 for hypotensive shock likely 2/2 gastrointestinal bleed. She reports having a history of hemorrhoids for at least 10 years which would only be symptomatic maybe once or twice a year. Her symptoms at this time were blood on the tissue paper.     However, approximately 3-4 weeks ago, she started having blood in her stool again. This would color the toilet bowel water as well as be present on her toilet paper with wiping. She had associated lightheadedness and fatigue at that time so was seen in clinic. They reviewed labs and told her to come in because she was anemic. Upon arrival she was found to have hgb of 6.4. She was admitted to the MICU where she received 2 units pRBCs. CTA did not identify active bleeding. GI work up including EGD and colonoscopy did not identify an active source of bleeding as well but noted hemorrhoids. Transferred out of the ICU 7/29 with hgb fluctuating between 7-9. Today had episode of hematochezia with hgb dropped from 9.5 yesterday to 7.4 this AM with recheck hgb 8.5. Had been restarted on apixiban prior to this. Has required no further transfusions. Colorectal consulted due to concern for hemorrhoidal bleeding.     Today reports having blood in the toilet bowl and in her stool, unable to say if it was mixed in vs coating stool. Denies lightheadedness, shortness or breath, chest pain, additional episodes of diarrhea. She normally has ~1-2 bowel movements daily. Reports drinking a couple of glasses of water daily, ~2 cups of coffee daily, 5 cups of soda a week. Says she spends about ~10 minutes on the toilet.            Past Medical History:     Past Medical History:   Diagnosis Date     Acute kidney injury (H) 05/13/2019     Acute massive pulmonary embolism (H) 05/13/2019     Acute pancreatitis 08/18/2018     Acute pancreatitis 02/26/2021    due to ETOH     Acute thoracic back pain 04/07/2021     ARAMIS (acute kidney injury) (H) 05/26/2019     Cardiac arrest, cause unspecified (H)  2019    massive pulmonary embolism with pulseless electrical activity cardiac arrest in May 2019 following gastric bypass in 2019      Depression with anxiety      GERD (gastroesophageal reflux disease)      History of alcohol use disorder      HTN (hypertension)      Infection due to 2019 novel coronavirus 2020    COVID19 infection in 2020     Ischemic colitis (H) 2019     Morbid obesity (H)      Scalp laceration, initial encounter 2020          Past Surgical History:     Past Surgical History:   Procedure Laterality Date      SECTION       COLONOSCOPY N/A 2022    Procedure: COLONOSCOPY;  Surgeon: Chandrakant Toledo MD;  Location:  GI     ESOPHAGOSCOPY, GASTROSCOPY, DUODENOSCOPY (EGD), COMBINED N/A 2022    Procedure: ESOPHAGOGASTRODUODENOSCOPY, WITH BIOPSY;  Surgeon: Chandrakant Toledo MD;  Location:  GI     LAPAROSCOPIC GASTRIC SLEEVE N/A 2019    Procedure: Laparoscopic Sleeve Gastrectomy;  Surgeon: Luan Lopez MD;  Location: [facility] OR     ORTHOPEDIC SURGERY      2 knee meniscus surgery            Social History:     Social History     Tobacco Use     Smoking status: Current Every Day Smoker     Packs/day: 0.25     Years: 1.00     Pack years: 0.25     Types: Cigarettes     Start date: 2016     Smokeless tobacco: Never Used   Substance Use Topics     Alcohol use: Not Currently     Comment: Quit drinking when last hospitalized            Family History:     Family History   Problem Relation Age of Onset     Pulmonary Embolism Mother      Diabetes Father      Hypertension Father      Breast Cancer Sister 26        surgery only     Cancer Sister      Breast Cancer Sister      Mental Illness Sister         Bipolar     Coronary Artery Disease Other         paternal aunt     Thyroid Disease Other         neice     Coronary Artery Disease Other      Cerebrovascular Disease Other      Thyroid Disease Other      Liver Disease No family hx of      Colon Cancer  No family hx of              Allergies:   No Known Allergies          Medications:     Current Facility-Administered Medications   Medication     acetaminophen (TYLENOL) tablet 650 mg     [Held by provider] apixaban ANTICOAGULANT (ELIQUIS) tablet 5 mg     glucose gel 15-30 g    Or     dextrose 50 % injection 25-50 mL    Or     glucagon injection 1 mg     folic acid (FOLVITE) tablet 1 mg     folic acid-vit B6-vit B12 (FOLGARD) per tablet 1 tablet     hydrocortisone (ANUSOL-HC) Suppository 25 mg     hydrOXYzine (ATARAX) tablet 25 mg     insulin aspart (NovoLOG) injection (RAPID ACTING)     insulin aspart (NovoLOG) injection (RAPID ACTING)     methocarbamol (ROBAXIN) tablet 500-1,000 mg     ondansetron (ZOFRAN ODT) ODT tab 4 mg     pantoprazole (PROTONIX) IV push injection 40 mg     [Held by provider] potassium chloride ER (KLOR-CON M) CR tablet 20 mEq     pramox-pe-glycerin-petrolatum (PREPARATION H) cream     pregabalin (LYRICA) capsule 200 mg     venlafaxine (EFFEXOR XR) 24 hr capsule 225 mg     witch hazel-glycerin (TUCKS) pad             Review of Systems:     Complete review of systems negative except as documented in HPI          Physical Exam:   All vitals have been reviewed  Temp:  [96.4  F (35.8  C)-99  F (37.2  C)] 96.4  F (35.8  C)  Pulse:  [] 90  Resp:  [18] 18  BP: ()/(54-84) 99/54  SpO2:  [97 %-100 %] 100 %    Intake/Output Summary (Last 24 hours) at 7/30/2022 1422  Last data filed at 7/30/2022 0630  Gross per 24 hour   Intake 800 ml   Output 500 ml   Net 300 ml       Physical Exam:   Gen: Laying in bed, no acute distress, looks comfortable  HEENT: Normocephalic, atraumatic.   Neck: No JVD  Pulm: Non-labored breathing on room air  CV: RRR, no tachycardia  Abd: non-distended  Extremities: warm, well perfused  Neuro: A&Ox3  Psych: appropriate  Skin: no jaundice or rashes    Anorectal: External skin tags and external hemorrhoids. ERICKSON without hard masses, but likely enlarged internal hemorrhoids  (L lateral largest). No blood on glove.                  Data:   All laboratory data reviewed    Results:  BMP  Recent Labs   Lab 07/30/22  1347 07/30/22  0755 07/30/22  0556 07/30/22  0150 07/29/22  0811 07/29/22  0454 07/28/22  0907 07/28/22  0637 07/27/22  0815 07/27/22  0457   NA  --   --  138  --   --  143  --  135*  --  138   POTASSIUM  --   --  3.9  --   --  4.0  --  4.1  --  4.8   CHLORIDE  --   --  107  --   --  109*  --  100  --  105   CO2  --   --  17*  --   --  19*  --  18*  --  17*   BUN  --   --  9.0  --   --  9.8  --  11.5  --  17.9   CR  --   --  1.53*  --   --  1.44*  --  1.60*  --  2.00*   * 163* 118* 126*   < > 110*   < > 123*   < > 71    < > = values in this interval not displayed.     CBC  Recent Labs   Lab 07/30/22  0859 07/30/22  0556 07/29/22  0856 07/29/22  0454 07/28/22  1649 07/28/22  0637 07/27/22  1217 07/27/22  0457   WBC  --  9.3  --  9.5  --  11.9*  --  12.8*   HGB 8.5* 7.4* 9.5* 7.7*   < > 10.2*   < > 8.1*   PLT  --  270  --  312  --  340  --  222    < > = values in this interval not displayed.     LFT  Recent Labs   Lab 07/26/22  1320 07/26/22  1143   AST  --  221*   * 134*   ALKPHOS 283* 283*   BILITOTAL 2.6* 2.6*   ALBUMIN 2.4* 1.7*   INR 1.95*  --      Recent Labs   Lab 07/30/22  1347 07/30/22  0755 07/30/22  0556 07/30/22  0150 07/29/22  2211 07/29/22  1716   * 163* 118* 126* 115* 111*       Imaging:  CTA ABDOMEN AND PELVIS 7/26/2022 3:45 PM  FINDINGS: CTA: No active extravasation demonstrated.     Aorta patent without stenosis, dissection, or aneurysm. Celiac,  superior mesenteric, bilateral single renal, and inferior mesenteric  arteries patent. Bilateral common, internal, and external iliac  arteries patent. Bilateral common femoral arteries patent.     Bilateral common femoral veins patent. Bilateral common, internal, and  external iliac veins patent. Inferior vena cava patent. Renal and  hepatic veins patent.     Splenic, superior mesenteric, and portal  veins patent.     CT: Lung bases clear.     Hepatosteatosis.     Sleeve gastrectomy postoperative changes. Bowel decompressed.     Gallbladder sludge suggested. No pericholecystic fluid.     T9 compression fracture, similar to previous.                                                                  IMPRESSION: No active extravasation demonstrated     Procedures:   Colonoscopy:   Findings:        Hemorrhoids were found on perianal exam.        The colon (entire examined portion) appeared normal.        The terminal ileum appeared normal.        No additional abnormalities were found on retroflexion.                                                                                     Impression:            - No cause of bleeding identified.                          - External hemorrhoids, may be hemorrhoidal bleeding.                          - Remainder of the exam including retroflexion was                          normal.   Recommendation:        - Resume previous diet.                          - No GI contraindication to anticoagulation.                          - GI will sign off.

## 2022-07-30 NOTE — PROGRESS NOTES
[facility]    Progress Note - Medicine Service, [facility] TEAM 1       Date of Admission:  7/26/2022    Assessment & Plan        Hilaria Bonner is a 31 year old female admitted on 7/26/2022. She has a history of HTN, T2DM, alcohol use, and prior PE c/b subsequent PEA arrest on Xarelto, fatty liver disease and anxiety who is admitted for hypotensive shock secondary to likely GI bleed.     Changes today:  -Hgb dropped from 9.5-->7.4 this AM. Recheck Hgb 8.5. Hemodynamically stable but did have bloody BM this AM. Already had EGD/C on 7/28--no further recs per GI on reassessment today.   -Colorectal surgery consulted regarding hemorrhoids. Conservative management recommended for now with potential for hemorrhoidectomy later during admission. Recommend increased hydration, fiber, psyllium. Will continue prep H and witch hazel wipes  -Will continue to monitor Hgb and vitals.     -Repeat Hgb 7.5. Continues to be hemodynamically stable. Off tele. Will continue to hold apixaban and recheck in AM.     #?GI Bleed vs. Hemorrhoids   #Acute macrocytic anemia 2/2 blood loss vs. Anemia of chronic disease  #Hypovolemic shock 2/2 blood loss vs. dehydration  P/w 3 weeks of hematochezia with associated lightheadedness and fatigue while on Xarelto. Hypotensive with improvement after repeat LR boluses. Baseline hgb wnl, on arrival 6.4, improved to 7.8 after 2 U pRBC. CTA abd/pelvis negative for active extravasation. Infectious stool panel negative. EGD/Colonoscopy performed 7/28 without evidence of acute bleed. Despite this, Hgb continues to wax and wane between 7-9 and has ongoing bloody stools. Unclear if blood is mixed in with the stool or streaked (pt and RN did not observe) but pt does have hemorrhoids (unknown what type--pt declined exam).   -PM Apixaban held after Hgb drop this AM.   -GI paged about Hgb drop this AM. Given her hemodynamic stability and that her recent EGD/C showed no  acute bleed, repeat scope not necessary at this time.   -Witch Hazel wipes 4x daily, Anusol HC suppositories 2-3x PRN daily, and H prep PRN for hemorrhoids.   -Colorectal surgery consulted regarding hemorrhoids. Conservative management recommended for now (increased hydration, fiber, psyllium, wipes, and discontinuing suppositories)   -Given 1 L bolus LR for hypotension. Responsive to fluids.   -Hgb 7.5 on recheck at 1700. Will continue to hold apixaban and recheck in AM.     #. Sinus tachycardia, likely 2/2 hypovolemia   HR at rest ~110 but shailesh to 150's with ambulation to and from commode today. Pt denies SOB, chest pain, or palpitations. Says that she chronically has tachycardia. Sinus rhythm on cardiac monitoring x2 days. No evidence for infection on exam and white count normal. Cannot rule out PE (especially given pt's hx) but pt satting well on room air and is already on anticoagulation. Suspect tachycardia 2/2 dehydration in the setting of ARAMIS, hypotension, and lactic acidosis  -Cardiac monitoring discontinued   -Will continue to monitor vitals     #ARAMIS - stable  Pt presented with creatinine elevated to 2.18 on admission with baseline at 0.7-0.8. Likely due to prerenal hypotension in setting of ?GI bleed vs. dehydration. Improving with pRBC and fluid resuscitation. Creatinine this morning was 1.53.    -Continue to bolus LR PRN for hypotension  -Continue to monitor with AM BMP    #Lactic acidosis - improving   Bicarb improved with volume resuscitation. Suspect contribution of elevated lactate (6/7 on admit, now down to 2.2), hypotension (resolving) and anemia (transfused).   - trend BMP    Chronic Medical Problems  #Hx of PE c/b PEA arrest in 2018  -PTA Xarelto held on arrival 2/2 concern for GI bleed. Started on apixaban 7/28 after bleed not found on EGD/C. May hold apixaban PM if Hgb continues to decline at recheck 7/30   -ECHO showed normal global and regional left ventricular function with EF of 55-60%. No  changes compared to previous study.  -Will continue to monitor     #Chronic peripheral neuropathy  -PTA Pregabalin     #Type 2 diabetes  A1c on admission 5.4. Glucose in low 100's since admission   -Hold PTA metformin  -monitor on BMP daily, will increase frequency if needed    #Chronic anxiety  -Continue PTA Venlafaxine  -restarted Zyprexa     #Obstructive Sleep Apnea  -CPAP at night    #Chronic hypertension  -Hold PTA Lisinopril, hydrochlorothiazide, and Prazosin while normotensive and intermittently hypotensive.     #R foot injury   Tripped and fell from standing height on 6/29, inverting her R ankle. Seen [facility] in [place] 6/30. R ankle XRs negative; R foot XRs with ? Calcaneal fracture at the calcaneal cuboid joint. Given CAM boot, which she has been wearing since. Continued to have R foot pain on admission. Repeat R foot XR without calcaneal fracture but there are ossific fragment projections over the proximal second metatarsal. No further workup or intervention recommended, per Ortho.   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 Appears pt has been following with neurology. Sx onset April 2020 4 weeks post-covid. NCS in 7/20 showed absent sensory responses in the upper and lower limbs and normal motor responses. Has been on maintenance IVIG q3 weeks (recently decreased to [id number]) since then and sx managed well with this per last neuro note 5/9/22.  - NTD       Diet: Regular Diet Adult    DVT Prophylaxis: VTE Prophylaxis contraindicated due to GI bleed  Gonzales Catheter: Not present  Fluids: LR fluid bolus  Central Lines: None  Cardiac Monitoring: None  Code Status: Full Code      The patient's care was discussed with the Attending Physician, Dr. Unger and Patient.    Jordyn Rivas, DO  Medicine Service, [facility] TEAM 1  [facility]  Securely  "message with the Vocera Web Console (learn more here)  Text page via [facility] Paging/Directory   Please see signed in provider for up to date coverage information      Clinically Significant Risk Factors Present on Admission                # Severe Obesity: Estimated body mass index is 45.32 kg/m  as calculated from the following:    Height as of an earlier encounter on 7/26/22: 1.689 m (5' 6.5\").    Weight as of this encounter: 129.3 kg (285 lb 0.9 oz).        ______________________________________________________________________    Interval History   Per overnight RN note, pt had 200 ml bloody stool this AM. Unclear what color the blood was or if it was mixed with the stool or streaked.   Hgb dropped from 9.5 to 7.4 this AM but remained hemodynamically stable.   Has known hx of hemorrhoids. Pt would like this fixed. Declines hemorrhoid exam. She would also like to go outside to smoke a cigarette.     Data reviewed today: I reviewed all medications, new labs and imaging results over the last 24 hours.  Physical Exam   Vital Signs: Temp: 97  F (36.1  C) Temp src: Oral BP: 111/63 Pulse: 110   Resp: 18 SpO2: 100 % O2 Device: None (Room air)    Weight: 285 lbs .88 oz  General Appearance: No acute distress  Respiratory: clear lung sounds, no increased work of breathing  Cardiovascular: mildly tachycardic with regular rhythm   GI: Non distended. Declines hemorrhoid exam.   Other: Alert and awake     Data   Recent Labs   Lab 07/30/22  0859 07/30/22  0755 07/30/22  0556 07/30/22  0150 07/29/22  1147 07/29/22  0856 07/29/22  0811 07/29/22  0454 07/28/22  0907 07/28/22  0637 07/26/22  1331 07/26/22  1320 07/26/22  1143   WBC  --   --  9.3  --   --   --   --  9.5  --  11.9*   < > 12.4* 12.1*   HGB 8.5*  --  7.4*  --   --  9.5*  --  7.7*   < > 10.2*   < > 6.4* 6.5*   MCV  --   --  105*  --   --   --   --  104*  --  105*   < > 110* 113*   PLT  --   --  270  --   --   --   --  312  --  340   < > 405 431   INR  --   --   --   --   " --   --   --   --   --   --   --  1.95*  --    NA  --   --  138  --   --   --   --  143  --  135*   < > 137 137   POTASSIUM  --   --  3.9  --   --   --   --  4.0  --  4.1   < > 4.2 4.3   CHLORIDE  --   --  107  --   --   --   --  109*  --  100   < > 101 103   CO2  --   --  17*  --   --   --   --  19*  --  18*   < > 17* 18*   BUN  --   --  9.0  --   --   --   --  9.8  --  11.5   < > 19.5 20   CR  --   --  1.53*  --   --   --   --  1.44*  --  1.60*   < > 2.14* 2.18*   ANIONGAP  --   --  14  --   --   --   --  15  --  17*   < > 19* 16*   QUINCY  --   --  7.4*  --   --   --   --  7.5*  --  8.0*   < > 7.9* 7.9*   GLC  --  163* 118* 126*   < >  --    < > 110*   < > 123*   < > 132* 161*   ALBUMIN  --   --   --   --   --   --   --   --   --   --   --  2.4* 1.7*   PROTTOTAL  --   --   --   --   --   --   --   --   --   --   --  5.9* 6.3*   BILITOTAL  --   --   --   --   --   --   --   --   --   --   --  2.6* 2.6*   ALKPHOS  --   --   --   --   --   --   --   --   --   --   --  283* 283*   ALT  --   --   --   --   --   --   --   --   --   --   --  123* 134*   AST  --   --   --   --   --   --   --   --   --   --   --   --  221*    < > = values in this interval not displayed.

## 2022-07-31 ENCOUNTER — APPOINTMENT (OUTPATIENT)
Dept: PHYSICAL THERAPY | Facility: CLINIC | Age: 32
End: 2022-07-31
Payer: COMMERCIAL

## 2022-07-31 ENCOUNTER — APPOINTMENT (OUTPATIENT)
Dept: OCCUPATIONAL THERAPY | Facility: CLINIC | Age: 32
End: 2022-07-31
Payer: COMMERCIAL

## 2022-07-31 LAB
ANION GAP SERPL CALCULATED.3IONS-SCNC: 15 MMOL/L (ref 7–15)
BUN SERPL-MCNC: 9.4 MG/DL (ref 6–20)
CALCIUM SERPL-MCNC: 7.6 MG/DL (ref 8.6–10)
CHLORIDE SERPL-SCNC: 105 MMOL/L (ref 98–107)
CK SERPL-CCNC: 60 U/L (ref 26–192)
CREAT SERPL-MCNC: 1.44 MG/DL (ref 0.51–0.95)
DEPRECATED HCO3 PLAS-SCNC: 18 MMOL/L (ref 22–29)
GFR SERPL CREATININE-BSD FRML MDRD: 50 ML/MIN/1.73M2
GLUCOSE BLDC GLUCOMTR-MCNC: 103 MG/DL (ref 70–99)
GLUCOSE BLDC GLUCOMTR-MCNC: 122 MG/DL (ref 70–99)
GLUCOSE BLDC GLUCOMTR-MCNC: 125 MG/DL (ref 70–99)
GLUCOSE BLDC GLUCOMTR-MCNC: 136 MG/DL (ref 70–99)
GLUCOSE SERPL-MCNC: 145 MG/DL (ref 70–99)
GLUCOSE SERPL-MCNC: 97 MG/DL (ref 70–99)
HGB BLD-MCNC: 8.8 G/DL (ref 11.7–15.7)
HGB BLD-MCNC: 9.1 G/DL (ref 11.7–15.7)
MAGNESIUM SERPL-MCNC: 1.2 MG/DL (ref 1.7–2.3)
MAGNESIUM SERPL-MCNC: 1.5 MG/DL (ref 1.7–2.3)
MAGNESIUM SERPL-MCNC: 1.9 MG/DL (ref 1.7–2.3)
PHOSPHATE SERPL-MCNC: 2.7 MG/DL (ref 2.5–4.5)
POTASSIUM SERPL-SCNC: 4.1 MMOL/L (ref 3.4–5.3)
SODIUM SERPL-SCNC: 138 MMOL/L (ref 136–145)

## 2022-07-31 PROCEDURE — 250N000013 HC RX MED GY IP 250 OP 250 PS 637

## 2022-07-31 PROCEDURE — C9113 INJ PANTOPRAZOLE SODIUM, VIA: HCPCS

## 2022-07-31 PROCEDURE — 85018 HEMOGLOBIN: CPT

## 2022-07-31 PROCEDURE — 250N000013 HC RX MED GY IP 250 OP 250 PS 637: Performed by: INTERNAL MEDICINE

## 2022-07-31 PROCEDURE — 82947 ASSAY GLUCOSE BLOOD QUANT: CPT | Performed by: INTERNAL MEDICINE

## 2022-07-31 PROCEDURE — 97116 GAIT TRAINING THERAPY: CPT | Mod: GP

## 2022-07-31 PROCEDURE — 36415 COLL VENOUS BLD VENIPUNCTURE: CPT | Performed by: INTERNAL MEDICINE

## 2022-07-31 PROCEDURE — 36415 COLL VENOUS BLD VENIPUNCTURE: CPT

## 2022-07-31 PROCEDURE — 99232 SBSQ HOSP IP/OBS MODERATE 35: CPT | Mod: GC | Performed by: INTERNAL MEDICINE

## 2022-07-31 PROCEDURE — 97530 THERAPEUTIC ACTIVITIES: CPT | Mod: GP

## 2022-07-31 PROCEDURE — 83735 ASSAY OF MAGNESIUM: CPT | Performed by: INTERNAL MEDICINE

## 2022-07-31 PROCEDURE — 120N000002 HC R&B MED SURG/OB UMMC

## 2022-07-31 PROCEDURE — 250N000011 HC RX IP 250 OP 636

## 2022-07-31 PROCEDURE — 97530 THERAPEUTIC ACTIVITIES: CPT | Mod: GO | Performed by: OCCUPATIONAL THERAPIST

## 2022-07-31 PROCEDURE — 83735 ASSAY OF MAGNESIUM: CPT

## 2022-07-31 PROCEDURE — 84100 ASSAY OF PHOSPHORUS: CPT

## 2022-07-31 PROCEDURE — 82374 ASSAY BLOOD CARBON DIOXIDE: CPT

## 2022-07-31 PROCEDURE — 82550 ASSAY OF CK (CPK): CPT

## 2022-07-31 PROCEDURE — 250N000011 HC RX IP 250 OP 636: Performed by: INTERNAL MEDICINE

## 2022-07-31 PROCEDURE — 82310 ASSAY OF CALCIUM: CPT

## 2022-07-31 RX ORDER — DIPHENHYDRAMINE HYDROCHLORIDE AND LIDOCAINE HYDROCHLORIDE AND ALUMINUM HYDROXIDE AND MAGNESIUM HYDRO
10 KIT EVERY 6 HOURS PRN
Status: DISCONTINUED | OUTPATIENT
Start: 2022-07-31 | End: 2022-07-31

## 2022-07-31 RX ORDER — MAGNESIUM SULFATE HEPTAHYDRATE 40 MG/ML
2 INJECTION, SOLUTION INTRAVENOUS ONCE
Status: COMPLETED | OUTPATIENT
Start: 2022-07-31 | End: 2022-07-31

## 2022-07-31 RX ORDER — DIPHENHYDRAMINE HYDROCHLORIDE AND LIDOCAINE HYDROCHLORIDE AND ALUMINUM HYDROXIDE AND MAGNESIUM HYDRO
10 KIT EVERY 4 HOURS PRN
Status: DISCONTINUED | OUTPATIENT
Start: 2022-07-31 | End: 2022-08-11 | Stop reason: HOSPADM

## 2022-07-31 RX ORDER — MAGNESIUM SULFATE HEPTAHYDRATE 40 MG/ML
4 INJECTION, SOLUTION INTRAVENOUS ONCE
Status: DISCONTINUED | OUTPATIENT
Start: 2022-07-31 | End: 2022-07-31

## 2022-07-31 RX ADMIN — ACETAMINOPHEN 650 MG: 325 TABLET, FILM COATED ORAL at 08:34

## 2022-07-31 RX ADMIN — DIPHENHYDRAMINE HYDROCHLORIDE AND LIDOCAINE HYDROCHLORIDE AND ALUMINUM HYDROXIDE AND MAGNESIUM HYDRO 10 ML: KIT at 21:23

## 2022-07-31 RX ADMIN — MAGNESIUM SULFATE IN WATER 2 G: 40 INJECTION, SOLUTION INTRAVENOUS at 14:27

## 2022-07-31 RX ADMIN — GLYCERIN, PETROLATUM, PHENYLEPHRINE HCL, PRAMOXINE HCL: 144; 2.5; 10; 15 CREAM TOPICAL at 07:57

## 2022-07-31 RX ADMIN — PANTOPRAZOLE SODIUM 40 MG: 40 INJECTION, POWDER, FOR SOLUTION INTRAVENOUS at 12:38

## 2022-07-31 RX ADMIN — ACETAMINOPHEN 650 MG: 325 TABLET, FILM COATED ORAL at 01:43

## 2022-07-31 RX ADMIN — PREGABALIN 200 MG: 100 CAPSULE ORAL at 13:55

## 2022-07-31 RX ADMIN — ACETAMINOPHEN 650 MG: 325 TABLET, FILM COATED ORAL at 13:57

## 2022-07-31 RX ADMIN — FOLIC ACID 1 MG: 1 TABLET ORAL at 07:54

## 2022-07-31 RX ADMIN — METHOCARBAMOL 1000 MG: 500 TABLET ORAL at 08:34

## 2022-07-31 RX ADMIN — WITCH HAZEL: 500 SOLUTION RECTAL; TOPICAL at 19:11

## 2022-07-31 RX ADMIN — DIPHENHYDRAMINE HYDROCHLORIDE AND LIDOCAINE HYDROCHLORIDE AND ALUMINUM HYDROXIDE AND MAGNESIUM HYDRO 10 ML: KIT at 18:02

## 2022-07-31 RX ADMIN — GLYCERIN, PETROLATUM, PHENYLEPHRINE HCL, PRAMOXINE HCL: 144; 2.5; 10; 15 CREAM TOPICAL at 14:14

## 2022-07-31 RX ADMIN — PANTOPRAZOLE SODIUM 40 MG: 40 INJECTION, POWDER, FOR SOLUTION INTRAVENOUS at 01:01

## 2022-07-31 RX ADMIN — METHOCARBAMOL 1000 MG: 500 TABLET ORAL at 16:15

## 2022-07-31 RX ADMIN — PREGABALIN 200 MG: 100 CAPSULE ORAL at 07:54

## 2022-07-31 RX ADMIN — WITCH HAZEL: 500 SOLUTION RECTAL; TOPICAL at 16:13

## 2022-07-31 RX ADMIN — MAGNESIUM SULFATE IN WATER 2 G: 40 INJECTION, SOLUTION INTRAVENOUS at 17:56

## 2022-07-31 RX ADMIN — VENLAFAXINE HYDROCHLORIDE 225 MG: 150 CAPSULE, EXTENDED RELEASE ORAL at 07:54

## 2022-07-31 RX ADMIN — OLANZAPINE 10 MG: 10 TABLET, FILM COATED ORAL at 21:23

## 2022-07-31 RX ADMIN — PREGABALIN 200 MG: 100 CAPSULE ORAL at 19:10

## 2022-07-31 RX ADMIN — WITCH HAZEL: 500 SOLUTION RECTAL; TOPICAL at 12:45

## 2022-07-31 RX ADMIN — GLYCERIN, PETROLATUM, PHENYLEPHRINE HCL, PRAMOXINE HCL: 144; 2.5; 10; 15 CREAM TOPICAL at 20:33

## 2022-07-31 RX ADMIN — NICOTINE 1 CARTRIDGE: 4 INHALANT RESPIRATORY (INHALATION) at 21:23

## 2022-07-31 RX ADMIN — WITCH HAZEL: 500 SOLUTION RECTAL; TOPICAL at 07:57

## 2022-07-31 RX ADMIN — PSYLLIUM HUSK 1 PACKET: 3.4 POWDER ORAL at 07:55

## 2022-07-31 RX ADMIN — Medication 1 TABLET: at 07:54

## 2022-07-31 ASSESSMENT — ACTIVITIES OF DAILY LIVING (ADL)
ADLS_ACUITY_SCORE: 47
ADLS_ACUITY_SCORE: 48
ADLS_ACUITY_SCORE: 47
ADLS_ACUITY_SCORE: 48
ADLS_ACUITY_SCORE: 48
ADLS_ACUITY_SCORE: 47
ADLS_ACUITY_SCORE: 48
ADLS_ACUITY_SCORE: 47

## 2022-07-31 NOTE — PROVIDER NOTIFICATION
Paged Dr Bertha Gutierrez @ [phone]    Pt is requesting a nicotine inhaler please. Thanks Ameena [phone]    Order placed

## 2022-07-31 NOTE — PROGRESS NOTES
Patient has been educated on potential risks of choosing to leave the unit and that the responsibility for patient well-being will belong to the patient. Pt has been informed that admission to hospital is due to need for medical treatment. Education given to the patient on some of the potential risks included but are not limited to:      - lack of access to nursing intervention      - possible missed appointments with MD, therapies, tests      - possible missed medications, antibiotics, management of IV's    Patient Response: I'm still going down to smoke    Patient notified staff prior to leaving unit: yes  Coban wrap placed over IV prior to pt leaving unit yes

## 2022-07-31 NOTE — PROGRESS NOTES
Preoperative Note    Hilaria Bonner is 31 year old female here at the request of Dr. Way for cardiovascular, pulmonary, and perioperative risk assessment prior to surgery.The intended surgical procedure is hemorrhoidectomy. A copy of this note will be sent to the surgeon.    Past Medical History:   Diagnosis Date     Acute kidney injury (H) 2019     Acute massive pulmonary embolism (H) 2019     Acute pancreatitis 2018     Acute pancreatitis 2021    due to ETOH     Acute thoracic back pain 2021     ARAMIS (acute kidney injury) (H) 2019     Cardiac arrest, cause unspecified (H) 2019    massive pulmonary embolism with pulseless electrical activity cardiac arrest in May 2019 following gastric bypass in 2019      Depression with anxiety      GERD (gastroesophageal reflux disease)      History of alcohol use disorder      HTN (hypertension)      Infection due to 2019 novel coronavirus 2020    COVID19 infection in 2020     Ischemic colitis (H) 2019     Morbid obesity (H)      Scalp laceration, initial encounter 2020      Past Surgical History:   Procedure Laterality Date      SECTION       COLONOSCOPY N/A 2022    Procedure: COLONOSCOPY;  Surgeon: Chandrakant Toledo MD;  Location: [facility]     ESOPHAGOSCOPY, GASTROSCOPY, DUODENOSCOPY (EGD), COMBINED N/A 2022    Procedure: ESOPHAGOGASTRODUODENOSCOPY, WITH BIOPSY;  Surgeon: Chandrakant Toledo MD;  Location: [facility]     LAPAROSCOPIC GASTRIC SLEEVE N/A 2019    Procedure: Laparoscopic Sleeve Gastrectomy;  Surgeon: Luan Lopez MD;  Location: [facility]     ORTHOPEDIC SURGERY      2 knee meniscus surgery      Current Facility-Administered Medications   Medication     acetaminophen (TYLENOL) tablet 650 mg     [Held by provider] apixaban ANTICOAGULANT (ELIQUIS) tablet 5 mg     glucose gel 15-30 g    Or     dextrose 50 % injection 25-50 mL    Or     glucagon injection 1 mg     folic acid  (FOLVITE) tablet 1 mg     folic acid-vit B6-vit B12 (FOLGARD) per tablet 1 tablet     hydrOXYzine (ATARAX) tablet 25 mg     insulin aspart (NovoLOG) injection (RAPID ACTING)     insulin aspart (NovoLOG) injection (RAPID ACTING)     magic mouthwash suspension (diphenhydramine, lidocaine, aluminum-magnesium & simethicone)     menthol-zinc oxide (CALMOSEPTINE) 0.44-20.6 % ointment OINT     methocarbamol (ROBAXIN) tablet 500-1,000 mg     nicotine (NICOTROL) inhalation solution 1 spray     nicotine (NICOTROL) Inhaler 1 Cartridge     OLANZapine (zyPREXA) tablet 10 mg     ondansetron (ZOFRAN ODT) ODT tab 4 mg     pantoprazole (PROTONIX) IV push injection 40 mg     [Held by provider] potassium chloride ER (KLOR-CON M) CR tablet 20 mEq     pramox-pe-glycerin-petrolatum (PREPARATION H) cream     pregabalin (LYRICA) capsule 200 mg     psyllium (METAMUCIL/KONSYL) Packet 1 packet     venlafaxine (EFFEXOR XR) 24 hr capsule 225 mg     witch hazel-glycerin (TUCKS) pad     Immunization History   Administered Date(s) Administered     HPV 12/06/2016     HepB-Adult 07/26/2022     Influenza Vaccine IM > 6 months Valent IIV4 (Alfuria,Fluzone) 12/06/2016, 03/27/2019, 09/25/2019, 09/30/2020, 01/18/2022     Pneumococcal 20 valent Conjugate (Prevnar 20) 07/26/2022     TDAP Vaccine (Adacel) 02/29/2020     TDAP Vaccine (Boostrix) 12/31/2013       This is a LOW risk surgery.    HPI:   Reason for surgery:  (4+HPI factors)  Bloody stools x several weeks. Anemia 2/2 bleeding.     Cardiovascular Risk:  She does not have any active cardiac conditions.  She does have a history of prior MI (PE w/ PEA arrest), smoker and hypertension and does not have a history of valvular disease.    This patient ambulates without assist and without chest pain. She IS able to climb a flight of stairs without chest pain.    Pulmonary Risk:  In terms of risk factors for pulmonary complications, the patient does not have a history of CHF, COPD, age >60 years, being  functionally dependent.      Is this patient going to undergo any of the following procedures that would put her at higher risk of postoperative pulmonary complications:  Prolonged surgery (>3 hours): No  Abdominal surgery/thoracic surgery/neurosurgery/head and neck: No  Vascular/aortic aneurysm repair: No  General anesthesia: unknown at this time     Perioperative Complications:  The patient does have a history of bleeding or clotting problems in the past. The patient has not had complications from past surgeries.  The patient does not have a family history of any anesthesia or surgical complications.    Social History     Socioeconomic History     Marital status: Single     Spouse name: Not on file     Number of children: 2     Years of education: Not on file     Highest education level: Not on file   Occupational History     Not on file   Tobacco Use     Smoking status: Current Every Day Smoker     Packs/day: 0.25     Years: 1.00     Pack years: 0.25     Types: Cigarettes     Start date: 11/20/2016     Smokeless tobacco: Never Used   Vaping Use     Vaping Use: Never used   Substance and Sexual Activity     Alcohol use: Not Currently     Comment: Quit drinking when last hospitalized     Drug use: Not Currently     Types: Marijuana     Sexual activity: Yes     Partners: Male     Birth control/protection: Injection   Other Topics Concern     Parent/sibling w/ CABG, MI or angioplasty before 65F 55M? Not Asked   Social History Narrative     Not on file     Social Determinants of Health     Financial Resource Strain: Low Risk      Difficulty of Paying Living Expenses: Not very hard   Food Insecurity: Food Insecurity Present     Worried About Running Out of Food in the Last Year: Sometimes true     Ran Out of Food in the Last Year: Sometimes true   Transportation Needs: No Transportation Needs     Lack of Transportation (Medical): No     Lack of Transportation (Non-Medical): No   Physical Activity: Not on file   Stress:  Not on file   Social Connections: Not on file   Intimate Partner Violence: Not on file   Housing Stability: Not on file        Family History   Problem Relation Age of Onset     Pulmonary Embolism Mother      Diabetes Father      Hypertension Father      Breast Cancer Sister 26        surgery only     Cancer Sister      Breast Cancer Sister      Mental Illness Sister         Bipolar     Coronary Artery Disease Other         paternal aunt     Thyroid Disease Other         neice     Coronary Artery Disease Other      Cerebrovascular Disease Other      Thyroid Disease Other      Liver Disease No family hx of      Colon Cancer No family hx of         Review Of Systems  Constitutional: Negative  Eyes: negative  Ears/Nose/Throat: negative  Cardiovascular: positive for sinus tachycardia  Respiratory: No shortness of breath, dyspnea on exertion, cough, or hemoptysis  Gastrointestinal: negative, hemorrhoids and hematochezia  Genitourinary: negative  Musculoskeletal: positive for vague extremity pains and foot pain  Skin: negative  Neurologic: peripheral neuropathy   Endocrine: negative  Hematologic/Lymphatic/Immunologic: negative  Psychiatric: negative        OBJECTIVE:  BP (!) 127/93 (BP Location: Right arm)   Pulse 103   Temp 97.9  F (36.6  C) (Oral)   Resp 18   Wt 130.3 kg (287 lb 4.8 oz)   LMP  (LMP Unknown)   SpO2 100%   BMI 45.68 kg/m    Body mass index is 45.68 kg/m .   Gen: Well-developed, well-nourished and in no apparent distress  HEENT:  Normocephalic, atraumatic, PERRL, mild scleral icterus, oral pharynx clear  Neck: supple, no LAD, no thyromegaly  CV: Tachy with regular rhythm, normal S1 S2, no S3 or S4 and no murmur, click, or rub  Resp: clear to ausculation bilaterally, normal respiratory effort  Abd: bowel sounds present, soft NT/ND,  no masses or hepatosplenomegaly  Ext: warm.  No peripheral edema.  Skin: dry and warm  Psych: normal mood/affect, appropriately oriented      A/P:    The patient with    Past Medical History:   Diagnosis Date     Acute kidney injury (H) 05/13/2019     Acute massive pulmonary embolism (H) 05/13/2019     Acute pancreatitis 08/18/2018     Acute pancreatitis 02/26/2021    due to ETOH     Acute thoracic back pain 04/07/2021     ARAMIS (acute kidney injury) (H) 05/26/2019     Cardiac arrest, cause unspecified (H) 05/13/2019    massive pulmonary embolism with pulseless electrical activity cardiac arrest in May 2019 following gastric bypass in April 2019      Depression with anxiety      GERD (gastroesophageal reflux disease)      History of alcohol use disorder      HTN (hypertension)      Infection due to 2019 novel coronavirus 04/2020    COVID19 infection in April 2020     Ischemic colitis (H) 05/26/2019     Morbid obesity (H)      Scalp laceration, initial encounter 02/29/2020    presents prior to surgery for assessment of perioperative risk. The patient is at LOW risk for cardiovascular complications and at LOW risk for pulmonary complications of this LOW risk surgery.  She does not require further testing.    No evidence of functionally compromising cardiac or pulmonary status    The patient's apixaban has been held in anticipation of the procedure.     The patient is recommended to hold aspirin or NSAIDS for 10 days prior to surgery.    The patient is instructed as to which medications to take with sips of water the morning of surgery    Proceed with surgery as planned.  No food or liquids the morning of surgery.    Laboratory studies:  CBC and BMP  Pregnancy testing was not indicated.      Please contact if there are any further questions or information required about this patient.    Jordyn Rivas MD    Internal Medicine Resident   [facility]

## 2022-07-31 NOTE — PROGRESS NOTES
Pt VSS on RA. Neuropathy type pain in extremities, Robaxin PRN X 1 with relief. NO BM this shitf, no blood reported.t. Pt using Tucks and hemorrhoid treatment Preparation H. Hgb 7.5. BG ACHS 102. 174. Good appetite. Bolus 1000 Ml this shift. Right foot Fx: brace/boot on. Pt wt baring as tolerated. Out side to smoke.Pt ate take out dinner. Up to BSC with SBA and walker. Makes needs known. Will continue to monitor and notify provider with any changes.

## 2022-07-31 NOTE — PROGRESS NOTES
[facility]    Progress Note - Medicine Service, [facility] TEAM 1       Date of Admission:  7/26/2022    Assessment & Plan        Hilaria Bonner is a 31 year old female admitted on 7/26/2022. She has a history of HTN, T2DM, alcohol use, and prior PE c/b subsequent PEA arrest on Xarelto, fatty liver disease and anxiety who is admitted for hypotensive shock secondary to likely GI bleed.     Changes today:  -Hgb improved to 8.8 -->9.1 today on recheck  -Maintaining hemodynamic stability--BP improved today   -Colorectal surgery following for hemorrhoids: psyllium, increased water intake, tuck pads, Calmoseptine ointment, and prep H. Planning for hemorrhoidectomy inpatient [facility] (8/3?). Medically optimized for procedure (see separate note) and will continue to hold apixaban in anticipation of procedure.  -Mg 2.1--> started on magnesium replacement protocol      #?GI Bleed vs. Hemorrhoids   #Acute macrocytic anemia 2/2 blood loss vs. Anemia of chronic disease  #Hypovolemic shock 2/2 blood loss vs. Dehydration, improving  P/w 3 weeks of hematochezia with associated lightheadedness and fatigue while on Xarelto. Hypotensive with improvement after repeat LR boluses. Baseline hgb wnl, on arrival 6.4, improved to 7.8 after 2 U pRBC. CTA abd/pelvis negative for active extravasation. Infectious stool panel negative. EGD/Colonoscopy performed 7/28 without evidence of acute bleed. Despite this, Hgb continues to wax and wane between 7-9 and has ongoing bloody stools. Likely source is hemorrhoids.   -Colorectal surgery consulted regarding hemorrhoids. Conservative management recommended for now (increased hydration, psyllium, tuck wipes, calmoseptine ointment, and discontinuing suppositories). Plan for hemorrhoidectomy inpatient [facility]. Medically optimized for the procedure (see separate note). Apixaban will continue to be held.   -BP improved today, no IV fluids needed  -Continue Hgb  rechecks     #. Acute on chronic sinus tachycardia--stable  Resting -115 with intermittent acceleration with ambulation. Says that this has been normal for her since her PE. Pt denies SOB, chest pain, or palpitations.  Sinus rhythm on cardiac monitoring x2 days--now stopped. No evidence for infection on exam and white count normal. Continues to be satting well on room air. Tachycardia improved to low 100's after IV hydration. Suspect tachycardia 2/2 dehydration in the setting of ARAMIS, hypotension, and lactic acidosis vs an autonomic dysfunction.   -Will continue to monitor vitals     #. Extremity Pains  #. Hypomagnesia   Describes vague aches and pains in her arms and legs. Different from her neuropathy.   -Mg 2.1 --> started on Mg replacement protocol  -K 4.1  -CK normal   -Will recheck Mg in AM     #ARAMIS - improving  Pt presented with creatinine elevated to 2.18 on admission with baseline at 0.7-0.8. Likely due to prerenal hypotension in setting of ?GI bleed vs. dehydration. Improving with pRBC and fluid resuscitation. Creatinine this morning was 1.44  -LR bolus PRN   -Continue to monitor with AM BMP    #Lactic acidosis - improving   Bicarb improved with volume resuscitation. Suspect contribution of elevated lactate (6/7 on admit, now down to 2.2), hypotension (resolving) and anemia (transfused).   - trend BMP    Chronic Medical Problems  #Hx of PE c/b PEA arrest in 2018  -PTA Xarelto held on arrival 2/2 concern for GI bleed. Started on apixaban 7/28 after bleed not found on EGD/C. May hold apixaban PM if Hgb continues to decline at recheck 7/30   -ECHO showed normal global and regional left ventricular function with EF of 55-60%. No changes compared to previous study.  -Will continue to monitor     #Chronic peripheral neuropathy  -PTA Pregabalin     #Type 2 diabetes  A1c on admission 5.4. Glucose in low 100's since admission   -Hold PTA metformin  -monitor on BMP daily, will increase frequency if  needed    #Chronic anxiety  -Continue PTA Venlafaxine  -restarted Zyprexa     #Obstructive Sleep Apnea  -CPAP at night    #Chronic hypertension  -Hold PTA Lisinopril, hydrochlorothiazide, and Prazosin while normotensive and intermittently hypotensive.     #R foot injury   Tripped and fell from standing height on 6/29, inverting her R ankle. Seen at [facility] in [place] 6/30. R ankle XRs negative; R foot XRs with ? Calcaneal fracture at the calcaneal cuboid joint. Given CAM boot, which she has been wearing since. Continued to have R foot pain on admission. Repeat R foot XR without calcaneal fracture but there are ossific fragment projections over the proximal second metatarsal. No further workup or intervention recommended, per Ortho.   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 Appears pt has been following with neurology. Sx onset April 2020 4 weeks post-covid. NCS in 7/20 showed absent sensory responses in the upper and lower limbs and normal motor responses. Has been on maintenance IVIG q3 weeks (recently decreased to [id number]) since then and sx managed well with this per last neuro note 5/9/22.  - NTD       Diet: Regular Diet Adult    DVT Prophylaxis: VTE Prophylaxis contraindicated due to GI bleed  Gonzales Catheter: Not present  Fluids: LR fluid bolus  Central Lines: None  Cardiac Monitoring: None  Code Status: Full Code      The patient's care was discussed with the Attending Physician, Dr. Unger and Patient.    Jordyn Rivas, DO  Medicine Service, [facility] TEAM [address] [place] [address]  Securely message with the Vocera Web Console (learn more here)  Text page via [facility] Paging/Directory   Please see signed in provider for up to date coverage information      Clinically Significant Risk Factors Present on Admission                        ______________________________________________________________________    Interval History   Had one formed BM this AM with mild streaks of blood. Feels well today except for continued arm and leg aches and pains. These sxs are not new for her. Denies any chest pain or SOB.     Data reviewed today: I reviewed all medications, new labs and imaging results over the last 24 hours.  Physical Exam   Vital Signs: Temp: 97.9  F (36.6  C) Temp src: Oral BP: (!) 127/93 Pulse: 103   Resp: 18 SpO2: 100 % O2 Device: None (Room air)    Weight: 287 lbs 4.8 oz  General Appearance: No acute distress  Respiratory: clear lung sounds, no increased work of breathing  Cardiovascular: mildly tachycardic with regular rhythm   GI: Non distended. Soft, non tender.   Extremities: No peripheral edema   Other: Alert and awake     Data   Recent Labs   Lab 07/31/22  1442 07/31/22  1237 07/31/22  0720 07/31/22  0634 07/30/22  1750 07/30/22  1722 07/30/22  0755 07/30/22  0556 07/29/22  0811 07/29/22  0454 07/28/22  0907 07/28/22  0637 07/26/22  1331 07/26/22  1320 07/26/22  1143   WBC  --   --   --   --   --   --   --  9.3  --  9.5  --  11.9*   < > 12.4* 12.1*   HGB  --  9.1*  --  8.8*  --  7.5*   < > 7.4*   < > 7.7*   < > 10.2*   < > 6.4* 6.5*   MCV  --   --   --   --   --   --   --  105*  --  104*  --  105*   < > 110* 113*   PLT  --   --   --   --   --   --   --  270  --  312  --  340   < > 405 431   INR  --   --   --   --   --   --   --   --   --   --   --   --   --  1.95*  --    NA  --   --   --  138  --   --   --  138  --  143  --  135*   < > 137 137   POTASSIUM  --   --   --  4.1  --   --   --  3.9  --  4.0  --  4.1   < > 4.2 4.3   CHLORIDE  --   --   --  105  --   --   --  107  --  109*  --  100   < > 101 103   CO2  --   --   --  18*  --   --   --  17*  --  19*  --  18*   < > 17* 18*   BUN  --   --   --  9.4  --   --   --  9.0  --  9.8  --  11.5   < > 19.5 20   CR  --   --   --  1.44*  --   --   --  1.53*  --  1.44*  --  1.60*   < >  2.14* 2.18*   ANIONGAP  --   --   --  15  --   --   --  14  --  15  --  17*   < > 19* 16*   QUINCY  --   --   --  7.6*  --   --   --  7.4*  --  7.5*  --  8.0*   < > 7.9* 7.9*   *  --  103* 97   < >  --    < > 118*   < > 110*   < > 123*   < > 132* 161*   ALBUMIN  --   --   --   --   --   --   --   --   --   --   --   --   --  2.4* 1.7*   PROTTOTAL  --   --   --   --   --   --   --   --   --   --   --   --   --  5.9* 6.3*   BILITOTAL  --   --   --   --   --   --   --   --   --   --   --   --   --  2.6* 2.6*   ALKPHOS  --   --   --   --   --   --   --   --   --   --   --   --   --  283* 283*   ALT  --   --   --   --   --   --   --   --   --   --   --   --   --  123* 134*   AST  --   --   --   --   --   --   --   --   --   --   --   --   --   --  221*    < > = values in this interval not displayed.

## 2022-07-31 NOTE — PLAN OF CARE
Goal Outcome Evaluation:    Plan of Care Reviewed With: patient     Time 7338-1633    /81 (BP Location: Right arm)   Pulse 92   Temp 98.2  F (36.8  C) (Oral)   Resp 18   Wt 130.3 kg (287 lb 4.8 oz)   LMP  (LMP Unknown)   SpO2 100%   BMI 45.68 kg/m      Reason for admission: Hypotensive shock secondary to likely GI bleed.  Activity: Assist of 1  Pain: Generalized, relieved by Robaxin.  Neuro: A&Ox4  Cardiac: WDL  Respiratory: WDL on RA, CPAP overnight.  GI/: Hemorrhoid. BM this shift. Voiding spontaneously.  Diet: Regular  Lines: 2 PIV  Labs/imaging: Reviewed. Mag 1.5      New changes this shift: Mag 1.5, replaced via IV. Magic mouth wash given x2. , 122. Nicotine inhaler given x1.      Plan: Likely hemorrhoidectomy mid next week.      Continue to monitor and follow POC

## 2022-07-31 NOTE — PLAN OF CARE
A&Ox4. VSS on RA. Ax1 gb/w. Walked in halls w/PT & OT today. Good appetite.  and 136. Voids adequately. BM today. Passing flatus. RN managed magnesium protocol ordered & replaced. PIV x 2 SL. +N/T in extremities, typical presentation of discomfort per patient report. 2+ edema in BLE. Provider aware, advised to monitor. Denies SOB. Denies CP. Tenderness in right ankle d/t fracture. Boot in place when up and moving. Watching TV, on cell phone and reading between cares.     Goal Outcome Evaluation:    Plan of Care Reviewed With: patient     Overall Patient Progress: no change

## 2022-07-31 NOTE — PLAN OF CARE
Alert and oriented X4. AVSS. Denies SOB. Using CPAP. C/o right leg pain 6/10. Tylenol given. Observed sleeping following intervention. Up to bedside commode with stand-by assist. Had mixture of stool and urine. Some streaks of blood noted on stool. Pt sleeping  in between cares.

## 2022-07-31 NOTE — PROGRESS NOTES
Colon & Rectal Surgery Progress Note    Subjective:   Feeling better this AM. No BPR overnight.    Objective: /85 (BP Location: Right arm, Patient Position: Sitting)   Pulse 118   Temp 97.5  F (36.4  C) (Oral)   Resp 18   Wt 287 lb 4.8 oz   LMP  (LMP Unknown)   SpO2 100%   BMI 45.68 kg/m     Recent Labs   Lab Test 07/31/22  0634 07/30/22  0859 07/30/22  0556   WBC  --   --  9.3   RBC  --   --  2.22*   HGB 8.8*   < > 7.4*   HCT  --   --  23.4*   MCV  --   --  105*   MCH  --   --  33.3*   MCHC  --   --  31.6   RDW  --   --  17.6*   PLT  --   --  270    < > = values in this interval not displayed.       Intake/Output Summary (Last 24 hours) at 7/31/2022 0947  Last data filed at 7/31/2022 0659  Gross per 24 hour   Intake 480 ml   Output --   Net 480 ml       Large external hemorrhoids with no evidence of thrombosis or active bleeding.    Assessment:  Acute blood loss anemia requiring transfusion related to bleeding hemorrhoids in the setting of liver disease and therapeutic anticoagulation.     Plan:  - High fiber diet with daily Metamucil.  - Sitz baths 3-4 times per day.  - Calmoseptine on perianal skin 3-4 times per day.  - Avoid constipation.   - Card consult for preop clearance.  - Hold all anticoagulation including NSAIDs.  - Likely hemorrhoidectomy mid next week. Will need to restart anticoagulation inpatient after surgery to assess for postop bleeding.    Chip Way M.D., M.Sc.    Colon and Rectal Surgery  Pager: [phone].

## 2022-08-01 ENCOUNTER — APPOINTMENT (OUTPATIENT)
Dept: ULTRASOUND IMAGING | Facility: CLINIC | Age: 32
End: 2022-08-01
Attending: COLON & RECTAL SURGERY
Payer: COMMERCIAL

## 2022-08-01 ENCOUNTER — TELEPHONE (OUTPATIENT)
Dept: SLEEP MEDICINE | Facility: CLINIC | Age: 32
End: 2022-08-01

## 2022-08-01 ENCOUNTER — DOCUMENTATION ONLY (OUTPATIENT)
Dept: SURGERY | Facility: CLINIC | Age: 32
End: 2022-08-01

## 2022-08-01 DIAGNOSIS — K64.4 EXTERNAL HEMORRHOIDS: Primary | ICD-10-CM

## 2022-08-01 DIAGNOSIS — G47.33 OSA (OBSTRUCTIVE SLEEP APNEA): Primary | ICD-10-CM

## 2022-08-01 LAB
ANION GAP SERPL CALCULATED.3IONS-SCNC: 13 MMOL/L (ref 7–15)
BACTERIA BLD CULT: NO GROWTH
BACTERIA BLD CULT: NO GROWTH
BUN SERPL-MCNC: 8 MG/DL (ref 6–20)
CALCIUM SERPL-MCNC: 7.3 MG/DL (ref 8.6–10)
CHLORIDE SERPL-SCNC: 106 MMOL/L (ref 98–107)
CREAT SERPL-MCNC: 1.25 MG/DL (ref 0.51–0.95)
DEPRECATED HCO3 PLAS-SCNC: 19 MMOL/L (ref 22–29)
ERYTHROCYTE [DISTWIDTH] IN BLOOD BY AUTOMATED COUNT: 17.2 % (ref 10–15)
GFR SERPL CREATININE-BSD FRML MDRD: 59 ML/MIN/1.73M2
GLUCOSE BLDC GLUCOMTR-MCNC: 128 MG/DL (ref 70–99)
GLUCOSE BLDC GLUCOMTR-MCNC: 144 MG/DL (ref 70–99)
GLUCOSE BLDC GLUCOMTR-MCNC: 154 MG/DL (ref 70–99)
GLUCOSE BLDC GLUCOMTR-MCNC: 155 MG/DL (ref 70–99)
GLUCOSE SERPL-MCNC: 107 MG/DL (ref 70–99)
HCT VFR BLD AUTO: 24 % (ref 35–47)
HGB BLD-MCNC: 7.6 G/DL (ref 11.7–15.7)
HOLD SPECIMEN: NORMAL
MCH RBC QN AUTO: 33.6 PG (ref 26.5–33)
MCHC RBC AUTO-ENTMCNC: 31.7 G/DL (ref 31.5–36.5)
MCV RBC AUTO: 106 FL (ref 78–100)
PLATELET # BLD AUTO: 262 10E3/UL (ref 150–450)
POTASSIUM SERPL-SCNC: 3.6 MMOL/L (ref 3.4–5.3)
RBC # BLD AUTO: 2.26 10E6/UL (ref 3.8–5.2)
SODIUM SERPL-SCNC: 138 MMOL/L (ref 136–145)
WBC # BLD AUTO: 9.8 10E3/UL (ref 4–11)

## 2022-08-01 PROCEDURE — 99233 SBSQ HOSP IP/OBS HIGH 50: CPT | Mod: GC | Performed by: INTERNAL MEDICINE

## 2022-08-01 PROCEDURE — 250N000013 HC RX MED GY IP 250 OP 250 PS 637: Performed by: STUDENT IN AN ORGANIZED HEALTH CARE EDUCATION/TRAINING PROGRAM

## 2022-08-01 PROCEDURE — C9113 INJ PANTOPRAZOLE SODIUM, VIA: HCPCS

## 2022-08-01 PROCEDURE — 93970 EXTREMITY STUDY: CPT

## 2022-08-01 PROCEDURE — 99223 1ST HOSP IP/OBS HIGH 75: CPT | Mod: GC | Performed by: INTERNAL MEDICINE

## 2022-08-01 PROCEDURE — 250N000013 HC RX MED GY IP 250 OP 250 PS 637: Performed by: INTERNAL MEDICINE

## 2022-08-01 PROCEDURE — 250N000013 HC RX MED GY IP 250 OP 250 PS 637

## 2022-08-01 PROCEDURE — 93970 EXTREMITY STUDY: CPT | Mod: 26 | Performed by: RADIOLOGY

## 2022-08-01 PROCEDURE — 85027 COMPLETE CBC AUTOMATED: CPT

## 2022-08-01 PROCEDURE — 36415 COLL VENOUS BLD VENIPUNCTURE: CPT

## 2022-08-01 PROCEDURE — 80048 BASIC METABOLIC PNL TOTAL CA: CPT

## 2022-08-01 PROCEDURE — 250N000011 HC RX IP 250 OP 636

## 2022-08-01 PROCEDURE — 120N000002 HC R&B MED SURG/OB UMMC

## 2022-08-01 RX ORDER — NALOXONE HYDROCHLORIDE 0.4 MG/ML
0.2 INJECTION, SOLUTION INTRAMUSCULAR; INTRAVENOUS; SUBCUTANEOUS
Status: DISCONTINUED | OUTPATIENT
Start: 2022-08-01 | End: 2022-08-11 | Stop reason: HOSPADM

## 2022-08-01 RX ORDER — NALOXONE HYDROCHLORIDE 0.4 MG/ML
0.4 INJECTION, SOLUTION INTRAMUSCULAR; INTRAVENOUS; SUBCUTANEOUS
Status: DISCONTINUED | OUTPATIENT
Start: 2022-08-01 | End: 2022-08-11 | Stop reason: HOSPADM

## 2022-08-01 RX ORDER — OXYCODONE HYDROCHLORIDE 5 MG/1
5 TABLET ORAL EVERY 6 HOURS PRN
Status: DISCONTINUED | OUTPATIENT
Start: 2022-08-01 | End: 2022-08-03

## 2022-08-01 RX ADMIN — DIPHENHYDRAMINE HYDROCHLORIDE AND LIDOCAINE HYDROCHLORIDE AND ALUMINUM HYDROXIDE AND MAGNESIUM HYDRO 10 ML: KIT at 13:08

## 2022-08-01 RX ADMIN — GLYCERIN, PETROLATUM, PHENYLEPHRINE HCL, PRAMOXINE HCL: 144; 2.5; 10; 15 CREAM TOPICAL at 13:05

## 2022-08-01 RX ADMIN — OXYCODONE HYDROCHLORIDE 5 MG: 5 TABLET ORAL at 18:16

## 2022-08-01 RX ADMIN — PANTOPRAZOLE SODIUM 40 MG: 40 INJECTION, POWDER, FOR SOLUTION INTRAVENOUS at 23:48

## 2022-08-01 RX ADMIN — GLYCERIN, PETROLATUM, PHENYLEPHRINE HCL, PRAMOXINE HCL: 144; 2.5; 10; 15 CREAM TOPICAL at 19:45

## 2022-08-01 RX ADMIN — ACETAMINOPHEN 650 MG: 325 TABLET, FILM COATED ORAL at 18:15

## 2022-08-01 RX ADMIN — PREGABALIN 200 MG: 100 CAPSULE ORAL at 19:42

## 2022-08-01 RX ADMIN — PSYLLIUM HUSK 1 PACKET: 3.4 POWDER ORAL at 09:48

## 2022-08-01 RX ADMIN — Medication 1 TABLET: at 09:48

## 2022-08-01 RX ADMIN — DIPHENHYDRAMINE HYDROCHLORIDE AND LIDOCAINE HYDROCHLORIDE AND ALUMINUM HYDROXIDE AND MAGNESIUM HYDRO 10 ML: KIT at 18:17

## 2022-08-01 RX ADMIN — METHOCARBAMOL 1000 MG: 500 TABLET ORAL at 07:12

## 2022-08-01 RX ADMIN — NICOTINE 1 CARTRIDGE: 4 INHALANT RESPIRATORY (INHALATION) at 07:12

## 2022-08-01 RX ADMIN — PANTOPRAZOLE SODIUM 40 MG: 40 INJECTION, POWDER, FOR SOLUTION INTRAVENOUS at 13:05

## 2022-08-01 RX ADMIN — ACETAMINOPHEN 650 MG: 325 TABLET, FILM COATED ORAL at 13:08

## 2022-08-01 RX ADMIN — PREGABALIN 200 MG: 100 CAPSULE ORAL at 09:48

## 2022-08-01 RX ADMIN — FOLIC ACID 1 MG: 1 TABLET ORAL at 09:48

## 2022-08-01 RX ADMIN — OLANZAPINE 10 MG: 10 TABLET, FILM COATED ORAL at 23:47

## 2022-08-01 RX ADMIN — PANTOPRAZOLE SODIUM 40 MG: 40 INJECTION, POWDER, FOR SOLUTION INTRAVENOUS at 00:15

## 2022-08-01 RX ADMIN — PREGABALIN 200 MG: 100 CAPSULE ORAL at 13:05

## 2022-08-01 RX ADMIN — WITCH HAZEL: 500 SOLUTION RECTAL; TOPICAL at 10:04

## 2022-08-01 RX ADMIN — VENLAFAXINE HYDROCHLORIDE 225 MG: 150 CAPSULE, EXTENDED RELEASE ORAL at 09:59

## 2022-08-01 RX ADMIN — METHOCARBAMOL 1000 MG: 500 TABLET ORAL at 00:15

## 2022-08-01 RX ADMIN — WITCH HAZEL: 500 SOLUTION RECTAL; TOPICAL at 16:28

## 2022-08-01 RX ADMIN — DIPHENHYDRAMINE HYDROCHLORIDE AND LIDOCAINE HYDROCHLORIDE AND ALUMINUM HYDROXIDE AND MAGNESIUM HYDRO 10 ML: KIT at 07:12

## 2022-08-01 RX ADMIN — ACETAMINOPHEN 650 MG: 325 TABLET, FILM COATED ORAL at 00:15

## 2022-08-01 RX ADMIN — GLYCERIN, PETROLATUM, PHENYLEPHRINE HCL, PRAMOXINE HCL: 144; 2.5; 10; 15 CREAM TOPICAL at 10:05

## 2022-08-01 RX ADMIN — ACETAMINOPHEN 650 MG: 325 TABLET, FILM COATED ORAL at 07:12

## 2022-08-01 RX ADMIN — WITCH HAZEL: 500 SOLUTION RECTAL; TOPICAL at 19:45

## 2022-08-01 RX ADMIN — WITCH HAZEL: 500 SOLUTION RECTAL; TOPICAL at 13:05

## 2022-08-01 ASSESSMENT — ACTIVITIES OF DAILY LIVING (ADL)
ADLS_ACUITY_SCORE: 47

## 2022-08-01 NOTE — PLAN OF CARE
Alert and oriented X 4. AVSS. Denies SOB. C/o generalized  bilateral leg pain. She attributes some of the pain to edema. She is sleeping with legs elevated. Tylenol and Robaxin administered with some relief. Up to bedside commode with assist of 1. Had medium-sized formed stool. Small amount of blood noted on stool. Sleeping in between cares.

## 2022-08-01 NOTE — PROVIDER NOTIFICATION
Paged [phone]    LRM#[id number]    Needs order for alternative pain med. one dose of Robaxin left for the day.Call back-Oxycodone PRN orders placed.    Thanks  Paulette#[id number]

## 2022-08-01 NOTE — CONSULTS
Discharge Pharmacy Test Claim    Eliquis is covered by patient's [facility] prepaid medical assistance plan with a copay of $3/mo.    Test Claim Copay   eliquis 3.00       Clare Carrera  [facility] Liaison  Ph: [phone] Pager: [phone]

## 2022-08-01 NOTE — PLAN OF CARE
/74 (BP Location: Right arm)   Pulse 56   Temp 98.1  F (36.7  C) (Oral)   Resp 18   Wt 132.2 kg (291 lb 8 oz)   LMP  (LMP Unknown)   SpO2 95%   BMI 46.34 kg/m      5418-3245  A&Ox4. AVSS. Denies SOB. C/o generalized BLE pain.2+ edema. US to r/o DVT today, results unremarkable. Wears R boot w/walker. Tylenol and Robaxin administered with some relief.Oxycodone new order placed. Up to bedside commode with assist of 1.Sitz baths 3-4 times per day for hemorrhoids ,witch hazel and hemorrhoidal cream. Reported superficial blood in stools x3. Hemorrhoidectomy to be done in the middle of the week TBD.Upright in chair.Continue w/POC.

## 2022-08-01 NOTE — PROGRESS NOTES
Spoke with TEN Shearer, she said that Dr. Way wants to schedule this inpatient case on Weds Aug 4 at 4:00 PM at [facility]. Case scheduled as requested.    Eli Saini  Chantal-op Coordinator  [facility]  Direct Phone: [phone]

## 2022-08-01 NOTE — PROGRESS NOTES
Pt seen to see if she was ready to wear CPAP for overnight use. Currently not ready but machine on standby whenever ready. Knows to call if she needs help.    Marilyn Ramirez, LRT, BSRT-RRT

## 2022-08-01 NOTE — PROGRESS NOTES
Colorectal Surgery Progress Note  August 1, 2022     Ms Howell is a 30 y/o female with PMH of HTN, T2DM, alcohol use, PE w/ subsequent PEA arrest, fatty liver disease, and gastric sleeve, admitted on 7/26/2022 for hypotensive shock and likely Gi bleed. Workup included egd/colon without evident signs of bleeding, negative celiac biopsies, positive for external hemorrhoids. CRS consulted yesterday for possible hemorrhoidectomy this week.      Subjective:   Patient reports bilateral leg pain near the hips which she attributes to using the commode and peripheral bilateral leg edema. She was given tylenol and robaxin to control leg pain.   Passing stool with superficial blood  Tolerating a normal diet, small quantities in setting of gastric sleeve without n/v  Hemorrhoid pain is controlled with tylenol, prep H, and witch hazel.  Ambulating and urinating.     Physical Exam:  /60 (BP Location: Right arm)   Pulse 60   Temp 98.2  F (36.8  C) (Oral)   Resp 20   Wt 291 lb 8 oz   LMP  (LMP Unknown)   SpO2 90%   BMI 46.34 kg/m       General: Grossly alert and oriented  Cards: Cardiology preoperative consult yesterday cleared for surgery.  Pulmonary: No increased work of breathing on room air. Uses CPAP machine   Extremities: Moving extremities spontaneously. Warm and well-perfused. Bilateral lower leg pitting edema    I/Os:  I/O last 3 completed shifts:  In: 780 [P.O.:780]  Out: -       Labs:  Recent Labs   Lab 08/01/22  0556 07/31/22  1237 07/31/22  0634 07/30/22  0859 07/30/22  0556 07/29/22  0856 07/29/22  0454   WBC 9.8  --   --   --  9.3  --  9.5   HGB 7.6* 9.1* 8.8*   < > 7.4*   < > 7.7*     --   --   --  270  --  312    < > = values in this interval not displayed.       Recent Labs   Lab 08/01/22  0556 08/01/22  0255 07/31/22  2321 07/31/22  2052 07/31/22  1711 07/31/22  0720 07/31/22  0634 07/30/22  0755 07/30/22  0556     --   --   --   --   --  138  --  138   POTASSIUM 3.6  --   --   --   --    Ms Paco Moran is recovering well from her recent COVID pneumonia  She will continue to use her Breo daily and get a followup CXR in 6 weeks  We would like to see her in the office several weeks after discharge to do a 6 minute walk  We will do ambulatory pulse ox to determine oxygen needs prior to discharge  --  4.1  --  3.9   CHLORIDE 106  --   --   --   --   --  105  --  107   CO2 19*  --   --   --   --   --  18*  --  17*   BUN 8.0  --   --   --   --   --  9.4  --  9.0   CR 1.25*  --   --   --   --   --  1.44*  --  1.53*   * 144*  --  122* 145*   < > 97   < > 118*   QUINCY 7.3*  --   --   --   --   --  7.6*  --  7.4*   MAG  --   --  1.9  --  1.5*  --  1.2*  --   --    PHOS  --   --   --   --   --   --  2.7  --   --     < > = values in this interval not displayed.       Labs reviewed, notable for: Mg lower limit and replenished by IV yesterday, 7/30  Pending: CBC w/ platelet, BMP    Imaging:  No new imaging      ASSESSMENT/PLAN:   Hilaria Bonner is a 31 year old female with PMH of HTN, T2DM, alcohol use, PE w/ subsequent PEA arrest, fatty liver disease, and gastric sleeve, admitted on 7/26/2022. Acute blood loss anemia requiring transfusion related to bleeding hemorrhoids in the setting of therapeutic anticoagulation. Pt without any notable cardiac or liver hx besides fatty liver disease, so the bilateral pitting edema is unusual. Echo on 7/26 unremarkable and with normal EF.     Recommendations:   - Consult with hematology to review need for long-term coagulation therapy. Continue to hold in anticipation of surgery.   - High fiber diet with daily Metamucil.  - Continue Sitz baths 3-4 times per day.  - Continue Calmoseptine on perianal skin 3-4 times per day.  - Continue to avoid constipation.  - Continue to hold all anticoagulation including NSAIDs.  - Likely hemorrhoidectomy in the middle of the week.      Seen with CRS fellow and discussed with staff     Aubree Parmar, MS3      --------------------------------------------------------------------------------------------------------------------      I saw and evaluated the patient concurrently with the care team and agree with the student's assessment and plan as written above. I was present at all times for any mentioned procedures.      Please page if  questions,     Giovany Crowe MD  Surgical Resident  #[id number]

## 2022-08-01 NOTE — PROGRESS NOTES
[facility]    Progress Note - Medicine Service, [facility] TEAM 1       Date of Admission:  7/26/2022    Assessment & Plan        Hilaria Bonner is a 31 year old female admitted on 7/26/2022. She has a history of HTN, T2DM, alcohol use, and prior PE c/b subsequent PEA arrest on Xarelto, fatty liver disease and anxiety who is admitted for hypotensive shock secondary to likely GI bleed.     Changes today:  -Hgb down to 7.6 again today, small amount blood overnight  -t/b with colorectal re: anticoagulation - would like to hold off for now   -colorectal team appears to have placed a heme consult, we would respectfully request that future consult orders be recommended to us and orders to be placed by primary team   -Sitz baths for pain  -US BLE to r/o DVT    #?GI Bleed vs. Hemorrhoids   #Acute macrocytic anemia 2/2 blood loss vs. Anemia of chronic disease  #Hypovolemic shock 2/2 blood loss vs. Dehydration, improving  P/w 3 weeks of hematochezia with associated lightheadedness and fatigue while on Xarelto. Hypotensive with improvement after repeat LR boluses. Baseline hgb wnl, on arrival 6.4, improved to 7.8 after 2 U pRBC. CTA abd/pelvis negative for active extravasation. Infectious stool panel negative. EGD/Colonoscopy performed 7/28 without evidence of acute bleed. Despite this, Hgb continues to wax and wane between 7-9 and has ongoing bloody stools. Likely source is hemorrhoids.   -Colorectal surgery consulted regarding hemorrhoids. Conservative management recommended for now (increased hydration, psyllium, tuck wipes, calmoseptine ointment, and discontinuing suppositories). Plan [place] [facility] Medically optimized for the procedure (see separate note). Apixaban will continue to be held.   -- discuss today with colorectal re: starting heparin gtt while awaiting surgery given high risk for clotting   -BP stable  -Continue to trend hemoglobin      #.  Acute on chronic sinus tachycardia--stable  Resting -115 with intermittent acceleration with ambulation. Says that this has been normal for her since her PE. Pt denies SOB, chest pain, or palpitations.  Sinus rhythm on cardiac monitoring x2 days--now stopped. No evidence for infection on exam and white count normal. Continues to be satting well on room air. Tachycardia improved to low 100's after IV hydration. Suspect tachycardia 2/2 dehydration in the setting of ARAMIS, hypotension, and lactic acidosis vs an autonomic dysfunction.   -Will continue to monitor vitals     #. Extremity Pains  #. BLE edema, non-pitting  #. Hypomagnesia   Describes vague aches and pains in her arms and legs. Different from her neuropathy. CK normal.   -US to r/o DVT today   -CK normal       #ARAMIS - improving  Pt presented with creatinine elevated to 2.18 on admission with baseline at 0.7-0.8. Likely due to prerenal hypotension in setting of ?GI bleed vs. dehydration. Improving with pRBC and fluid resuscitation. Creatinine this morning was 1.44  -LR bolus PRN   -Continue to monitor with AM BMP    #Lactic acidosis - improving   Bicarb improved with volume resuscitation. Suspect contribution of elevated lactate (6/7 on admit, now down to 2.2), hypotension (resolving) and anemia (transfused).   - trend BMP    Chronic Medical Problems  #Hx of PE c/b PEA arrest in 2018  -PTA Xarelto held on arrival 2/2 concern for GI bleed. Started on apixaban 7/28 after bleed not found on EGD/C. May hold apixaban PM if Hgb continues to decline at recheck 7/30   -ECHO showed normal global and regional left ventricular function with EF of 55-60%. No changes compared to previous study.  -Will continue to monitor     #Chronic peripheral neuropathy  -PTA Pregabalin     #Type 2 diabetes  A1c on admission 5.4. Glucose in low 100's since admission   -Hold PTA metformin  -monitor on BMP daily, will increase frequency if needed    #Chronic anxiety  -Continue PTA  Venlafaxine  -restarted Zyprexa     #Obstructive Sleep Apnea  -CPAP at night    #Chronic hypertension  -Hold PTA Lisinopril, hydrochlorothiazide, and Prazosin while normotensive and intermittently hypotensive.     #R foot injury   Tripped and fell from standing height on 6/29, inverting her R ankle. Seen at [facility] in [place] 6/30. R ankle XRs negative; R foot XRs with ? Calcaneal fracture at the calcaneal cuboid joint. Given CAM boot, which she has been wearing since. Continued to have R foot pain on admission. Repeat R foot XR without calcaneal fracture but there are ossific fragment projections over the proximal second metatarsal. No further workup or intervention recommended, per Ortho.   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 Appears pt has been following with neurology. Sx onset April 2020 4 weeks post-covid. NCS in 7/20 showed absent sensory responses in the upper and lower limbs and normal motor responses. Has been on maintenance IVIG q3 weeks (recently decreased to [id number]) since then and sx managed well with this per last neuro note 5/9/22.  - NTD       Diet: Regular Diet Adult    DVT Prophylaxis: VTE Prophylaxis contraindicated due to GI bleed  Gonzales Catheter: Not present  Fluids: LR fluid bolus  Central Lines: None  Cardiac Monitoring: None  Code Status: Full Code      The patient's care was discussed with the Attending Physician, Dr. Unger and Patient.    Bertha Gutierrez MD  Medicine Service, [facility] TEAM 1  [employer]  Securely message with the Vocera Web Console (learn more here)  Text page via [facility] Paging/Directory   Please see signed in provider for up to date coverage information      Clinically Significant Risk Factors Present on Admission                      ______________________________________________________________________    Interval History   1x stool with small amount blood overnight. This morning feels ok but continues to have significant rectal pain. Has not yet tried a Sitz bath. Both legs feel swollen and warm to her. No leg pain.     4 pt ROS otherwise negative.     Data reviewed today: I reviewed all medications, new labs and imaging results over the last 24 hours.    Physical Exam   Vital Signs: Temp: 98.9  F (37.2  C) Temp src: Oral BP: 114/64 Pulse: 110   Resp: 18 SpO2: 99 % O2 Device: None (Room air)    Weight: 291 lbs 8 oz  General Appearance: No acute distress  Respiratory: clear lung sounds, no increased work of breathing  Cardiovascular: mildly tachycardic with regular rhythm   GI: Non distended. Soft, non tender  Extremities: mild non-pitting edema BLE  Other: Alert and awake     Data   Recent Labs   Lab 08/01/22  0556 08/01/22  0255 07/31/22  2052 07/31/22  1442 07/31/22  1237 07/31/22  0720 07/31/22  0634 07/30/22  0755 07/30/22  0556 07/29/22  0811 07/29/22  0454 07/26/22  1331 07/26/22  1320 07/26/22  1143   WBC 9.8  --   --   --   --   --   --   --  9.3  --  9.5   < > 12.4* 12.1*   HGB 7.6*  --   --   --  9.1*  --  8.8*   < > 7.4*   < > 7.7*   < > 6.4* 6.5*   *  --   --   --   --   --   --   --  105*  --  104*   < > 110* 113*     --   --   --   --   --   --   --  270  --  312   < > 405 431   INR  --   --   --   --   --   --   --   --   --   --   --   --  1.95*  --      --   --   --   --   --  138  --  138  --  143   < > 137 137   POTASSIUM 3.6  --   --   --   --   --  4.1  --  3.9  --  4.0   < > 4.2 4.3   CHLORIDE 106  --   --   --   --   --  105  --  107  --  109*   < > 101 103   CO2 19*  --   --   --   --   --  18*  --  17*  --  19*   < > 17* 18*   BUN 8.0  --   --   --   --   --  9.4  --  9.0  --  9.8   < > 19.5 20   CR 1.25*  --   --   --   --   --  1.44*  --  1.53*  --  1.44*   < > 2.14* 2.18*   ANIONGAP 13  --   --    --   --   --  15  --  14  --  15   < > 19* 16*   QUINCY 7.3*  --   --   --   --   --  7.6*  --  7.4*  --  7.5*   < > 7.9* 7.9*   * 144* 122*   < >  --    < > 97   < > 118*   < > 110*   < > 132* 161*   ALBUMIN  --   --   --   --   --   --   --   --   --   --   --   --  2.4* 1.7*   PROTTOTAL  --   --   --   --   --   --   --   --   --   --   --   --  5.9* 6.3*   BILITOTAL  --   --   --   --   --   --   --   --   --   --   --   --  2.6* 2.6*   ALKPHOS  --   --   --   --   --   --   --   --   --   --   --   --  283* 283*   ALT  --   --   --   --   --   --   --   --   --   --   --   --  123* 134*   AST  --   --   --   --   --   --   --   --   --   --   --   --   --  221*    < > = values in this interval not displayed.

## 2022-08-01 NOTE — PROGRESS NOTES
/82 (BP Location: Left arm)   Pulse 97   Temp 98.1  F (36.7  C) (Oral)   Resp 18   Wt 132.2 kg (291 lb 8 oz)   LMP  (LMP Unknown)   SpO2 100%   BMI 46.34 kg/m      Status: Hilaria is a 32 yo pt admitted on 07/26/22 with chronic anticoagulation, and elevated LFTs.    Activity: SBA. Uses walker to ambulate, ambulated outside this shift  Neuros:  A&O x4.   Cardiac:  WDL.   Respiratory:  WDL on RA. Denies SOB.  GI/:  Voiding spontaneously. +BS, passing flatus, BM today with some blood clots, provider aware.  Diet: Regular  Skin: Intact, edema, generalized in LLE  Lines: 2 PIVs SL  Incisions/Drains: None  Labs: Hgb 7.6  Pain/nausea: oxy 5 mg, tylenol for pain  New changes this shift: None  Plan:  Continue to monitor

## 2022-08-01 NOTE — CONSULTS
Hematology Consult initial consult note    Date of Service 08/01/2022  Admit Date 7/26/2022   Initial Consult 08/01/22   Hospital Day: 7     Reason for consult: anticoagulation management  Consult requested by: Seamus Lee    History of Present Illness  Hilaria Bonner is a 31 year old woman with a history of etoh use, sleeve gastrectomy, PE c/b PEA arrest in 2019 on rivaroxaban, who was admitted 7/26/2022 for 3 wks h/o BRBPR and presyncopal episodes.    Pt started to have gradual onset of LH, CUEVAS over the past several wks. Pt came to the ED on 7/26, and she was found to have hypotensive MAP in 50s, with Hb of 6.5. IVF and 2 PRBC were given, started on PPI BID. Her rivaroxaban was stopped on admission. EDG/cscpy performed on 7/28 which did not show any e/o bleed. The pt was resumed on anticoagulation with apixaban but it has been reported that pt still have intermittent ongoing bloody stools, therefore apixaban was held.  The cause of the H/H drop is felt to be due to her hemorrhoids and colorectal surgery planning to do hemorrhoidectomy on 8/3.    Regarding her h/o PE c/b PEA arrest in 2019, she was seen by Dr. Vigil on 6/10/2019 to review her indication for anticoagulation. Per chart review, pt was found unresponsive when she was driving back from [place] to [place]. She was brought to the [facility]. Pt developed cardiac arrest and w/up noted to have R side heart strain 2/2 massive PE. Pt was in the ICU, on pressors. W/up showed neg for Anti-cardiolipin Ab, FV Leiden mutation, prothrombin gene mutatoin, protein C and protein S deficiency. Pt was started on hep gtt then transition to warfarin. Per hem/onc note from 6/10/2019, it was felt that a 7 hrs drive is not enoug hto explain and that it was more likely an unprovoked event. Also considering that she has a strong family hx of VTE, it was recommended to cont anticoagulation lifelong. After that visit, she was obtained additional labs,  which showed wnl antithombin level, neg lupus anticoagulant x2.    At the time of my encounter, she does not report any CP/SOB, abd pain/n/v/d, no rashes or joint pain. Does still have hematochezia, but no melena or hematemesis.     PMH:  etoh use, PE c/b PEA arrest in 2019 on rivaroxaban  PSH: sleeve gastrectomy  FamHx: brother had VTE on lifelong anticoagulation, father had VTE was on 6 months anticoagulation  SocHx: 1.5 pack smoker per wk, no etoh or illicits  Meds reviewed in Epic.  Allergies reviewed in Epic  Comprehensive review of systems performed and otherwise negative unless mentioned above.    Physical Exam  /64 (BP Location: Right arm)   Pulse 110   Temp 98.9  F (37.2  C) (Oral)   Resp 18   Wt 132.2 kg (291 lb 8 oz)   LMP  (LMP Unknown)   SpO2 99%   BMI 46.34 kg/m       Constitutional: Awake, alert, cooperative, in NAD.  Eyes: PERRL, EOMI, sclera clear, conjunctiva normal.  ENT: Normocephalic, without obvious abnormality, oral pharynx with moist mucus membranes  Respiratory: Non-labored breathing, good air exchange, clear to auscultation bilaterally, no crackles or wheezing.  Cardiovascular: RRR, no murmur noted.  GI: + bowel sounds, soft, non-distended, non-tender, no masses palpated, no hepatosplenomegaly.  Skin: No concerning lesions or rash on exposed areas.  Musculoskeletal: B/L edema in LEs 1+, has a brace in R leg.  Neurologic: Awake, alert & oriented x3.    Psych: appropriate affect        Recent Labs   Lab 08/01/22  0556 07/27/22  0457 07/26/22  2223   WBC 9.8   < > 11.7*   HGB 7.6*   < > 7.9*      < > 172   *   < > 103*   RDW 17.2*   < > 18.6*   ANEU  --   --  8.8*   ALYM  --   --  2.1   BENOIT  --   --  0.6   AEOS  --   --  0.1    < > = values in this interval not displayed.     Recent Labs   Lab 08/01/22  0556 07/31/22  2321 07/31/22  1711 07/31/22  0634     --   --  138   POTASSIUM 3.6  --   --  4.1   CHLORIDE 106  --   --  105   CO2 19*  --   --  18*   BUN 8.0   --   --  9.4   CR 1.25*  --   --  1.44*   QUINCY 7.3*  --   --  7.6*   MAG  --  1.9   < > 1.2*   PHOS  --   --   --  2.7    < > = values in this interval not displayed.     Recent Labs   Lab 07/26/22  1320 07/26/22  1143   AST  --  221*   * 134*   ALKPHOS 283* 283*   ALBUMIN 2.4* 1.7*   PROTTOTAL 5.9* 6.3*   BILITOTAL 2.6* 2.6*      No results for input(s): IGA, IGM, IGE, ELPM, ELPINT, IEP, KAPPAFREELT, LAMBDAFREELT, KLR in the last 168 hours.    Invalid input(s): LGG   Recent Labs   Lab 07/29/22  0454 07/28/22  0637 07/26/22  2223 07/26/22  1323 07/26/22  1143   RETICABSCT  --   --  0.154*  --   --    RETP  --   --  6.1*  --   --    IRON  --  117  --   --   --    B12  --   --   --   --  >4,000*   FOLIC 24.6  --   --   --   --    HAPT  --   --   --  73  --      No results for input(s): MORPH in the last 168 hours.  No results for input(s): HCVAB, HCABC, HBCAB, AUSAB, HIV in the last 168 hours.  Recent Labs   Lab 07/26/22  1320   INR 1.95*        Impression:  #Likely unprovoked PE and PEA arrest in 2019  #Acute blood loss anemia requiring transfusion  #Hemorrhoids  #Family hx of VTEs    Ms. Bonner has a h/o PE on c/b PEA arrest in 2019 being treated with rivaroxaban and this time she was admitted due to acute loss anemia requiring transfusion, possibly due to her hemorrhoids. Regarding her h/o PE in 2019, this was felt to be unprovoked. She did have a 7 hrs drive but otherwise no other factor that could explain her episode of PE. She had testing done at that time (Anti-cardiolipin antibody, factor V Leiden mutation, prothrombin gene mutation, protein C and protein S deficiency, antithrombin, lupus anticoagulant) which were all unremarkable. Additionally, she has a strong family of VTE (brother and father had VTE) therefore she would benefit from lifelong anticoagulation. For this reason, we would not recommend any further hypercoagulable w/up.     She is currently scheduled to have hemorrhoid surgery this  "Wednesday 8/3, and we would recommend that she is resumed on anticoagulation post surgery once felt safe from surgical standpoint. She has been on rivaroxaban prior to this admission and was put on apixaban after she came here, but apixaban has been stop at this moment. While awaiting her surgery, we do not feel that she needs to be on any anticoagulation based on her US findings obtained today which was neg for DVTs. For the anticoagulation agent, apixaban which has BID dosing may provide a more stable drug level as well as lower bleeding complication, therefore we would recommend that she is resumed with apixaban and not rivaroxaban.     Recommendations:  -cont to hold anticoagulation for now, resume anticoagulation with apixaban after her hemorrhoids surgery and when felt safe from surgical standpoint  -we do not recommend any further hypercoagulable w/up  -we do not feel she needs to be anticoagulated while waiting for her surgery based on her neg US of LEs from today and she is still having issues with bleeding  -please encourage ambulation, and SCDs as non-pharmacologic prophylaxis  -we will coordinate so that she has f/u with her local hem/onc clinic    We will continue to follow. The above plan was discussed with Dr. Wes Hernandez, who agrees with the assessment and plan.     Thank you for allowing me to participate in the care of this patient. Please do not hesitate to contact me if there are any concerns or questions.     Juan Francisco Sharp MD  Hem/onc fellow  Division of Hematology, Oncology, and Transplantation  [facility]    Physician Attestation   I, Wes Hernandez MD, saw this patient with the resident and agree with the resident/fellow's findings and plan of care as documented in the note.      Key findings:   PE after a long car trip in 2018, with PEA arrest. Does not fit ISTH criteria for provoked VTE. We sometimes call these \"minimally provoked\". They end up being treated as unprovoked based on " current evidence. For that reason we would support indefinite anticoagulation. That said, she is having GI bleeding. It sounds like it is partly from hemorrhoids and so this is being surgically addressed. While she is bleeding I would avoid anticoagulation and make sure she is on nonpharmacologic VTE ppx. After her bleeding stops and surgery clears her, then recommend apixaban based on perhaps lower risk for GI bleed compared with rivaroxaban. Apixaban can be started at 5mg BID, no loading dose, as she has no current thrombosis.   Thank you for the consult. Hematology will sign off.     On the date of service, 08/01/2022, I spent time personally reviewing medical records and medications, reviewing vital signs, labs, and imaging results as summarized above, obtaining and reviewing records from [facility] as summarized above, discussing the patient's case on rounds with the fellow and FABIO, obtaining a history from the patient, performing a physical exam, counseling and educating the patient on the diagnosis and treatment, communication with the primary team, communication with other providers (Dr. Way from colorectal surgery), managing prescription medication, evaluating a potentially life or organ threatening problem, intensively monitoring treatments with high risk of toxicity, coordinating care and documenting in the electronic medical record.    Thank you for allowing me to participate in the care of this patient. Please do not hesitate to contact me if there are any concerns or questions.     Wes Hernandez MD   of Medicine  Classical Hematology and Blood and Marrow Transplantation  Division of Hematology, Oncology, and Transplantation  [facility] [phone]

## 2022-08-02 ENCOUNTER — ANESTHESIA EVENT (OUTPATIENT)
Dept: SURGERY | Facility: CLINIC | Age: 32
End: 2022-08-02
Payer: COMMERCIAL

## 2022-08-02 ENCOUNTER — APPOINTMENT (OUTPATIENT)
Dept: PHYSICAL THERAPY | Facility: CLINIC | Age: 32
End: 2022-08-02
Payer: COMMERCIAL

## 2022-08-02 ENCOUNTER — APPOINTMENT (OUTPATIENT)
Dept: OCCUPATIONAL THERAPY | Facility: CLINIC | Age: 32
End: 2022-08-02
Payer: COMMERCIAL

## 2022-08-02 LAB
ALBUMIN SERPL BCG-MCNC: 2.3 G/DL (ref 3.5–5.2)
ALP SERPL-CCNC: 218 U/L (ref 35–104)
ALT SERPL W P-5'-P-CCNC: 102 U/L (ref 10–35)
ANION GAP SERPL CALCULATED.3IONS-SCNC: 10 MMOL/L (ref 7–15)
AST SERPL W P-5'-P-CCNC: 189 U/L (ref 10–35)
BILIRUB SERPL-MCNC: 2.1 MG/DL
BUN SERPL-MCNC: 6.3 MG/DL (ref 6–20)
CALCIUM SERPL-MCNC: 7.5 MG/DL (ref 8.6–10)
CHLORIDE SERPL-SCNC: 108 MMOL/L (ref 98–107)
CREAT SERPL-MCNC: 1.09 MG/DL (ref 0.51–0.95)
DEPRECATED HCO3 PLAS-SCNC: 20 MMOL/L (ref 22–29)
ERYTHROCYTE [DISTWIDTH] IN BLOOD BY AUTOMATED COUNT: 17.3 % (ref 10–15)
GFR SERPL CREATININE-BSD FRML MDRD: 69 ML/MIN/1.73M2
GLUCOSE BLDC GLUCOMTR-MCNC: 130 MG/DL (ref 70–99)
GLUCOSE BLDC GLUCOMTR-MCNC: 132 MG/DL (ref 70–99)
GLUCOSE BLDC GLUCOMTR-MCNC: 137 MG/DL (ref 70–99)
GLUCOSE BLDC GLUCOMTR-MCNC: 141 MG/DL (ref 70–99)
GLUCOSE BLDC GLUCOMTR-MCNC: 176 MG/DL (ref 70–99)
GLUCOSE SERPL-MCNC: 124 MG/DL (ref 70–99)
HCT VFR BLD AUTO: 23.1 % (ref 35–47)
HGB BLD-MCNC: 7.2 G/DL (ref 11.7–15.7)
HGB BLD-MCNC: 8.3 G/DL (ref 11.7–15.7)
HUMAN PAPILLOMA VIRUS 16 DNA: NEGATIVE
HUMAN PAPILLOMA VIRUS 18 DNA: NEGATIVE
HUMAN PAPILLOMA VIRUS FINAL DIAGNOSIS: ABNORMAL
HUMAN PAPILLOMA VIRUS OTHER HR: POSITIVE
INR PPP: 1.13 (ref 0.85–1.15)
MAGNESIUM SERPL-MCNC: 1.5 MG/DL (ref 1.7–2.3)
MAGNESIUM SERPL-MCNC: 2 MG/DL (ref 1.7–2.3)
MCH RBC QN AUTO: 33.5 PG (ref 26.5–33)
MCHC RBC AUTO-ENTMCNC: 31.2 G/DL (ref 31.5–36.5)
MCV RBC AUTO: 107 FL (ref 78–100)
PLATELET # BLD AUTO: 237 10E3/UL (ref 150–450)
POTASSIUM SERPL-SCNC: 3.7 MMOL/L (ref 3.4–5.3)
PROT SERPL-MCNC: 5.1 G/DL (ref 6.4–8.3)
RBC # BLD AUTO: 2.15 10E6/UL (ref 3.8–5.2)
SODIUM SERPL-SCNC: 138 MMOL/L (ref 136–145)
WBC # BLD AUTO: 9.8 10E3/UL (ref 4–11)

## 2022-08-02 PROCEDURE — 120N000002 HC R&B MED SURG/OB UMMC

## 2022-08-02 PROCEDURE — 250N000013 HC RX MED GY IP 250 OP 250 PS 637: Performed by: INTERNAL MEDICINE

## 2022-08-02 PROCEDURE — 250N000013 HC RX MED GY IP 250 OP 250 PS 637

## 2022-08-02 PROCEDURE — 85018 HEMOGLOBIN: CPT

## 2022-08-02 PROCEDURE — 999N000157 HC STATISTIC RCP TIME EA 10 MIN

## 2022-08-02 PROCEDURE — 85610 PROTHROMBIN TIME: CPT | Performed by: STUDENT IN AN ORGANIZED HEALTH CARE EDUCATION/TRAINING PROGRAM

## 2022-08-02 PROCEDURE — 250N000013 HC RX MED GY IP 250 OP 250 PS 637: Performed by: COLON & RECTAL SURGERY

## 2022-08-02 PROCEDURE — 250N000011 HC RX IP 250 OP 636: Performed by: INTERNAL MEDICINE

## 2022-08-02 PROCEDURE — 94660 CPAP INITIATION&MGMT: CPT

## 2022-08-02 PROCEDURE — 36415 COLL VENOUS BLD VENIPUNCTURE: CPT | Performed by: INTERNAL MEDICINE

## 2022-08-02 PROCEDURE — 97110 THERAPEUTIC EXERCISES: CPT | Mod: GP

## 2022-08-02 PROCEDURE — 83735 ASSAY OF MAGNESIUM: CPT | Performed by: INTERNAL MEDICINE

## 2022-08-02 PROCEDURE — 97116 GAIT TRAINING THERAPY: CPT | Mod: GP

## 2022-08-02 PROCEDURE — 250N000013 HC RX MED GY IP 250 OP 250 PS 637: Performed by: STUDENT IN AN ORGANIZED HEALTH CARE EDUCATION/TRAINING PROGRAM

## 2022-08-02 PROCEDURE — 97530 THERAPEUTIC ACTIVITIES: CPT | Mod: GO | Performed by: OCCUPATIONAL THERAPIST

## 2022-08-02 PROCEDURE — 80053 COMPREHEN METABOLIC PANEL: CPT | Performed by: STUDENT IN AN ORGANIZED HEALTH CARE EDUCATION/TRAINING PROGRAM

## 2022-08-02 PROCEDURE — 36415 COLL VENOUS BLD VENIPUNCTURE: CPT | Performed by: STUDENT IN AN ORGANIZED HEALTH CARE EDUCATION/TRAINING PROGRAM

## 2022-08-02 PROCEDURE — 85027 COMPLETE CBC AUTOMATED: CPT | Performed by: STUDENT IN AN ORGANIZED HEALTH CARE EDUCATION/TRAINING PROGRAM

## 2022-08-02 PROCEDURE — 97535 SELF CARE MNGMENT TRAINING: CPT | Mod: GO | Performed by: OCCUPATIONAL THERAPIST

## 2022-08-02 PROCEDURE — 99232 SBSQ HOSP IP/OBS MODERATE 35: CPT | Mod: GC | Performed by: PEDIATRICS

## 2022-08-02 PROCEDURE — U0003 INFECTIOUS AGENT DETECTION BY NUCLEIC ACID (DNA OR RNA); SEVERE ACUTE RESPIRATORY SYNDROME CORONAVIRUS 2 (SARS-COV-2) (CORONAVIRUS DISEASE [COVID-19]), AMPLIFIED PROBE TECHNIQUE, MAKING USE OF HIGH THROUGHPUT TECHNOLOGIES AS DESCRIBED BY CMS-2020-01-R: HCPCS

## 2022-08-02 RX ORDER — PANTOPRAZOLE SODIUM 40 MG/1
40 TABLET, DELAYED RELEASE ORAL
Status: DISCONTINUED | OUTPATIENT
Start: 2022-08-02 | End: 2022-08-03

## 2022-08-02 RX ORDER — MAGNESIUM SULFATE HEPTAHYDRATE 40 MG/ML
2 INJECTION, SOLUTION INTRAVENOUS ONCE
Status: COMPLETED | OUTPATIENT
Start: 2022-08-02 | End: 2022-08-02

## 2022-08-02 RX ORDER — CEFAZOLIN SODIUM IN 0.9 % NACL 3 G/100 ML
3 INTRAVENOUS SOLUTION, PIGGYBACK (ML) INTRAVENOUS
Status: DISCONTINUED | OUTPATIENT
Start: 2022-08-02 | End: 2022-08-03 | Stop reason: HOSPADM

## 2022-08-02 RX ADMIN — PREGABALIN 200 MG: 100 CAPSULE ORAL at 13:04

## 2022-08-02 RX ADMIN — ACETAMINOPHEN 650 MG: 325 TABLET, FILM COATED ORAL at 02:48

## 2022-08-02 RX ADMIN — OXYCODONE HYDROCHLORIDE 5 MG: 5 TABLET ORAL at 22:27

## 2022-08-02 RX ADMIN — OXYCODONE HYDROCHLORIDE 5 MG: 5 TABLET ORAL at 02:48

## 2022-08-02 RX ADMIN — GLYCERIN, PETROLATUM, PHENYLEPHRINE HCL, PRAMOXINE HCL: 144; 2.5; 10; 15 CREAM TOPICAL at 20:48

## 2022-08-02 RX ADMIN — NICOTINE 1 CARTRIDGE: 4 INHALANT RESPIRATORY (INHALATION) at 17:29

## 2022-08-02 RX ADMIN — OXYCODONE HYDROCHLORIDE 5 MG: 5 TABLET ORAL at 16:31

## 2022-08-02 RX ADMIN — PSYLLIUM HUSK 1 PACKET: 3.4 POWDER ORAL at 09:30

## 2022-08-02 RX ADMIN — POLYETHYLENE GLYCOL 3350, SODIUM SULFATE ANHYDROUS, SODIUM BICARBONATE, SODIUM CHLORIDE, POTASSIUM CHLORIDE 2000 ML: 236; 22.74; 6.74; 5.86; 2.97 POWDER, FOR SOLUTION ORAL at 19:22

## 2022-08-02 RX ADMIN — DIPHENHYDRAMINE HYDROCHLORIDE AND LIDOCAINE HYDROCHLORIDE AND ALUMINUM HYDROXIDE AND MAGNESIUM HYDRO 10 ML: KIT at 02:49

## 2022-08-02 RX ADMIN — PREGABALIN 200 MG: 100 CAPSULE ORAL at 09:29

## 2022-08-02 RX ADMIN — WITCH HAZEL: 500 SOLUTION RECTAL; TOPICAL at 11:34

## 2022-08-02 RX ADMIN — FOLIC ACID 1 MG: 1 TABLET ORAL at 09:29

## 2022-08-02 RX ADMIN — ACETAMINOPHEN 650 MG: 325 TABLET, FILM COATED ORAL at 22:26

## 2022-08-02 RX ADMIN — GLYCERIN, PETROLATUM, PHENYLEPHRINE HCL, PRAMOXINE HCL: 144; 2.5; 10; 15 CREAM TOPICAL at 09:30

## 2022-08-02 RX ADMIN — POLYETHYLENE GLYCOL 3350, SODIUM SULFATE ANHYDROUS, SODIUM BICARBONATE, SODIUM CHLORIDE, POTASSIUM CHLORIDE 4000 ML: 236; 22.74; 6.74; 5.86; 2.97 POWDER, FOR SOLUTION ORAL at 09:29

## 2022-08-02 RX ADMIN — OLANZAPINE 10 MG: 10 TABLET, FILM COATED ORAL at 22:27

## 2022-08-02 RX ADMIN — GLYCERIN, PETROLATUM, PHENYLEPHRINE HCL, PRAMOXINE HCL: 144; 2.5; 10; 15 CREAM TOPICAL at 13:04

## 2022-08-02 RX ADMIN — PREGABALIN 200 MG: 100 CAPSULE ORAL at 20:43

## 2022-08-02 RX ADMIN — ACETAMINOPHEN 650 MG: 325 TABLET, FILM COATED ORAL at 16:32

## 2022-08-02 RX ADMIN — Medication 1 TABLET: at 09:29

## 2022-08-02 RX ADMIN — DIPHENHYDRAMINE HYDROCHLORIDE AND LIDOCAINE HYDROCHLORIDE AND ALUMINUM HYDROXIDE AND MAGNESIUM HYDRO 10 ML: KIT at 14:30

## 2022-08-02 RX ADMIN — VENLAFAXINE HYDROCHLORIDE 225 MG: 150 CAPSULE, EXTENDED RELEASE ORAL at 09:29

## 2022-08-02 RX ADMIN — OXYCODONE HYDROCHLORIDE 5 MG: 5 TABLET ORAL at 09:27

## 2022-08-02 RX ADMIN — HYDROXYZINE HYDROCHLORIDE 25 MG: 25 TABLET, FILM COATED ORAL at 13:03

## 2022-08-02 RX ADMIN — NICOTINE 1 CARTRIDGE: 4 INHALANT RESPIRATORY (INHALATION) at 19:22

## 2022-08-02 RX ADMIN — WITCH HAZEL: 500 SOLUTION RECTAL; TOPICAL at 16:27

## 2022-08-02 RX ADMIN — PANTOPRAZOLE SODIUM 40 MG: 40 TABLET, DELAYED RELEASE ORAL at 16:27

## 2022-08-02 RX ADMIN — MAGNESIUM SULFATE IN WATER 2 G: 40 INJECTION, SOLUTION INTRAVENOUS at 13:06

## 2022-08-02 RX ADMIN — WITCH HAZEL: 500 SOLUTION RECTAL; TOPICAL at 20:48

## 2022-08-02 RX ADMIN — WITCH HAZEL: 500 SOLUTION RECTAL; TOPICAL at 09:30

## 2022-08-02 ASSESSMENT — ACTIVITIES OF DAILY LIVING (ADL)
ADLS_ACUITY_SCORE: 46
ADLS_ACUITY_SCORE: 45
ADLS_ACUITY_SCORE: 47
ADLS_ACUITY_SCORE: 48
ADLS_ACUITY_SCORE: 45
ADLS_ACUITY_SCORE: 47
ADLS_ACUITY_SCORE: 45

## 2022-08-02 NOTE — PLAN OF CARE
Goal Outcome Evaluation:    BP 92/52 (BP Location: Right arm)   Pulse 107   Temp 98.2  F (36.8  C) (Axillary)   Resp 15   Wt 133.1 kg (293 lb 8 oz)   LMP  (LMP Unknown)   SpO2 100%   BMI 46.66 kg/m      Status: VSS, admitted for hypovolemic shock r/t GI bleed  Pain/Nausea: Pain in rectum rated 8/10- gave PRN oxycodone and atarax x1. Denies N/V  Mobility: SBA with gaitbelt- walked in archibald with PT  Diet: Clear L- Golytely prep, NPO @midnight  Labs: Hgb 8.3 Mg 1.5-recheck @1700   LDAs: 2 L PIV-SL  Skin/incisions: Intact, no new deficits noted  Neuro: A&Ox4, baseline N/T in hands and feet per pt.   Respiratory: RA sating > 95%. LS clear. Denies SOB  Cardiac: X tachycardia within parameters-denies chest pain  GI/: Voiding spontaneously per pt in BSC. BM this shift-blood in stool, team aware, several loose BM after Golytely with no blood. +BS Sitz baths 3-4 times per day for hemorrhoids ,witch hazel and hemorrhoidal cream  New Changes: Went out to smoke around 0800 and 1130  Plan: Continue with POC, hemorrhoidectomy 8/3

## 2022-08-02 NOTE — PLAN OF CARE
[id number]  VSS. Alert and oriented. Mom at bedside during visiting hours. Complaints of severe rectal pain. PRN oxy and tylenol given early this AM. Pt had one soft brown stool in the evening, and one brown/red BM early this AM. MD paged. No acute events, continue with POC.

## 2022-08-02 NOTE — PROGRESS NOTES
[facility]    Progress Note - Medicine Service, [facility] TEAM 1       Date of Admission:  7/26/2022    Assessment & Plan        Hilaria Bonner is a 31 year old female admitted on 7/26/2022. She has a history of HTN, T2DM, alcohol use, and prior PE c/b subsequent PEA arrest on Xarelto, fatty liver disease and anxiety who is admitted for hypotensive shock secondary to likely GI bleed.     Changes today:  -Blood clots and hematochezia this AM; remains hemodynamically stable   -Hgb 7.2 this AM. Repeat Hgb 8.3  -INR checks ordered, per colorectal surgery's request  -Plan for hemorrhoidectomy under general anesthesia at 1600 8/3 with Dr. Way. Medically optimized for surgery (see pre-op note on 7/31)    #?GI Bleed vs. Hemorrhoids   #Acute macrocytic anemia 2/2 blood loss vs. Anemia of chronic disease  #Hypovolemic shock 2/2 blood loss vs. Dehydration, improving  P/w 3 weeks of hematochezia with associated lightheadedness and fatigue while on Xarelto. Hypotensive with improvement after repeat LR boluses. Baseline hgb wnl, on arrival 6.4, improved to 7.8 after 2 U pRBC. CTA abd/pelvis negative for active extravasation. Infectious stool panel negative. EGD/Colonoscopy performed 7/28 without evidence of acute bleed. Despite this, Hgb continues to wax and wane between 7-9 and has ongoing bloody stools. Likely source is hemorrhoids.   -Colorectal surgery following regarding hemorrhoids:    -Continue increased hydration, psyllium, tuck wipes, calmoseptine  ointment, and sitz baths   -Plan for hemorrhoidectomy inpatient 8/3. Medically optimized for the  procedure (see separate note).    -Apixaban will continue to be held. Will restart after procedure   -BP stable  -Continue to trend hemoglobin      #. Acute on chronic sinus tachycardia--stable  Resting -115 with intermittent acceleration with ambulation. Says that this has been normal for her since her PE. Pt denies SOB,  chest pain, or palpitations.  Sinus rhythm on cardiac monitoring x2 days--now stopped. No evidence for infection on exam and white count normal. Continues to be satting well on room air. Tachycardia improved to low 100's after IV hydration. Suspect tachycardia 2/2 dehydration in the setting of ARAMIS, hypotension, and lactic acidosis vs an autonomic dysfunction.   -Will continue to monitor vitals     #. Extremity Pains  #. BLE edema, non-pitting  #. Hypomagnesia   Describes vague aches and pains in her arms and legs. Different from her neuropathy. CK normal. BLE US negative 8/1.   -Continue magnesium replacement protocol     #ARAMIS - improving  Pt presented with creatinine elevated to 2.18 on admission with baseline at 0.7-0.8. Likely due to prerenal hypotension in setting of ?GI bleed vs. dehydration. Improving with pRBC and fluid resuscitation. Creatinine this morning was 1.09  -Continue to monitor with AM BMP    #Lactic acidosis - improving   Bicarb improved with volume resuscitation. Suspect contribution of elevated lactate (6/7 on admit, now down to 2.2), hypotension (resolving) and anemia (transfused).   - trend BMP    Chronic Medical Problems  #Hx of PE c/b PEA arrest in 2018  -PTA Xarelto held on arrival 2/2 concern for GI bleed. Started on apixaban 7/28 after bleed not found on EGD/C. May hold apixaban PM if Hgb continues to decline at recheck 7/30   -ECHO showed normal global and regional left ventricular function with EF of 55-60%. No changes compared to previous study.  -Will continue to monitor     #Chronic peripheral neuropathy  -PTA Pregabalin     #Type 2 diabetes  A1c on admission 5.4. Glucose in low 100's since admission   -Hold PTA metformin  -monitor on BMP daily, will increase frequency if needed    #Chronic anxiety  -Continue PTA Venlafaxine  -restarted Zyprexa     #Obstructive Sleep Apnea  -CPAP at night    #Chronic hypertension  -Hold PTA Lisinopril, hydrochlorothiazide, and Prazosin while  normotensive and intermittently hypotensive.     #R foot injury   Tripped and fell from standing height on 6/29, inverting her R ankle. Seen [facility] in [place] 6/30. R ankle XRs negative; R foot XRs with ? Calcaneal fracture at the calcaneal cuboid joint. Given CAM boot, which she has been wearing since. Continued to have R foot pain on admission. Repeat R foot XR without calcaneal fracture but there are ossific fragment projections over the proximal second metatarsal. No further workup or intervention recommended, per Ortho.   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 Appears pt has been following with neurology. Sx onset April 2020 4 weeks post-covid. NCS in 7/20 showed absent sensory responses in the upper and lower limbs and normal motor responses. Has been on maintenance IVIG q3 weeks (recently decreased to [id number]) since then and sx managed well with this per last neuro note 5/9/22.  - NTD       Diet: Clear Liquid Diet  NPO per Anesthesia Guidelines for Procedure/Surgery Except for: Meds, Ice Chips    DVT Prophylaxis: VTE Prophylaxis contraindicated due to GI bleed  Gonzales Catheter: Not present  Fluids: LR fluid bolus  Central Lines: None  Cardiac Monitoring: None  Code Status: Full Code      The patient's care was discussed with the Attending Physician, Dr. Caceres, Patient and Colorectal surgery Consultant.    Jordyn Rivas, DO  Medicine Service, [facility] TEAM [address] [place] [address]  Securely message with the Vocera Web Console (learn more here)  Text page via [facility] Paging/Directory   Please see signed in provider for up to date coverage information      Clinically Significant Risk Factors Present on Admission                     ______________________________________________________________________    Interval History   Blood clots and bright red blood  in stool this AM. Annoyed that she is on a clear liquid diet. Denies any chest pain, palpitations, or shortness of breath.     4 pt ROS otherwise negative.     Data reviewed today: I reviewed all medications, new labs and imaging results over the last 24 hours.    Physical Exam   Vital Signs: Temp: 98.2  F (36.8  C) Temp src: (P) Oral BP: 92/52 Pulse: 107   Resp: 15 SpO2: 100 % O2 Device: None (Room air)    Weight: 293 lbs 8 oz  General Appearance: No acute distress  Respiratory: clear lung sounds, no increased work of breathing  Cardiovascular: mildly tachycardic with regular rhythm   GI: Non distended. Soft, non tender  Extremities: mild non-pitting edema BLE  Other: Alert and awake     Data   Recent Labs   Lab 08/02/22  0933 08/02/22  0719 08/02/22  0251 08/01/22  1149 08/01/22  0556 07/31/22  1442 07/31/22  1237 07/31/22  0720 07/31/22  0634 07/30/22  0755 07/30/22  0556 07/26/22  1331 07/26/22  1320 07/26/22  1143   WBC  --  9.8  --   --  9.8  --   --   --   --   --  9.3   < > 12.4* 12.1*   HGB  --  7.2*  --   --  7.6*  --  9.1*  --  8.8*   < > 7.4*   < > 6.4* 6.5*   MCV  --  107*  --   --  106*  --   --   --   --   --  105*   < > 110* 113*   PLT  --  237  --   --  262  --   --   --   --   --  270   < > 405 431   INR  --   --   --   --   --   --   --   --   --   --   --   --  1.95*  --    NA  --  138  --   --  138  --   --   --  138  --  138   < > 137 137   POTASSIUM  --  3.7  --   --  3.6  --   --   --  4.1  --  3.9   < > 4.2 4.3   CHLORIDE  --  108*  --   --  106  --   --   --  105  --  107   < > 101 103   CO2  --  20*  --   --  19*  --   --   --  18*  --  17*   < > 17* 18*   BUN  --  6.3  --   --  8.0  --   --   --  9.4  --  9.0   < > 19.5 20   CR  --  1.09*  --   --  1.25*  --   --   --  1.44*  --  1.53*   < > 2.14* 2.18*   ANIONGAP  --  10  --   --  13  --   --   --  15  --  14   < > 19* 16*   QUINCY  --  7.5*  --   --  7.3*  --   --   --  7.6*  --  7.4*   < > 7.9* 7.9*   * 124* 137*   < > 107*   < >   --    < > 97   < > 118*   < > 132* 161*   ALBUMIN  --  2.3*  --   --   --   --   --   --   --   --   --   --  2.4* 1.7*   PROTTOTAL  --  5.1*  --   --   --   --   --   --   --   --   --   --  5.9* 6.3*   BILITOTAL  --  2.1*  --   --   --   --   --   --   --   --   --   --  2.6* 2.6*   ALKPHOS  --  218*  --   --   --   --   --   --   --   --   --   --  283* 283*   ALT  --  102*  --   --   --   --   --   --   --   --   --   --  123* 134*   AST  --  189*  --   --   --   --   --   --   --   --   --   --   --  221*    < > = values in this interval not displayed.

## 2022-08-03 ENCOUNTER — PATIENT OUTREACH (OUTPATIENT)
Dept: FAMILY MEDICINE | Facility: CLINIC | Age: 32
End: 2022-08-03

## 2022-08-03 ENCOUNTER — ANESTHESIA (OUTPATIENT)
Dept: SURGERY | Facility: CLINIC | Age: 32
End: 2022-08-03
Payer: COMMERCIAL

## 2022-08-03 ENCOUNTER — TELEPHONE (OUTPATIENT)
Dept: FAMILY MEDICINE | Facility: CLINIC | Age: 32
End: 2022-08-03

## 2022-08-03 DIAGNOSIS — R87.810 CERVICAL HIGH RISK HPV (HUMAN PAPILLOMAVIRUS) TEST POSITIVE: ICD-10-CM

## 2022-08-03 LAB
ANION GAP SERPL CALCULATED.3IONS-SCNC: 11 MMOL/L (ref 7–15)
BUN SERPL-MCNC: 3.6 MG/DL (ref 6–20)
CALCIUM SERPL-MCNC: 7.1 MG/DL (ref 8.6–10)
CHLORIDE SERPL-SCNC: 106 MMOL/L (ref 98–107)
CREAT SERPL-MCNC: 0.87 MG/DL (ref 0.51–0.95)
DEPRECATED HCO3 PLAS-SCNC: 21 MMOL/L (ref 22–29)
ERYTHROCYTE [DISTWIDTH] IN BLOOD BY AUTOMATED COUNT: 17.4 % (ref 10–15)
GFR SERPL CREATININE-BSD FRML MDRD: >90 ML/MIN/1.73M2
GLUCOSE BLDC GLUCOMTR-MCNC: 109 MG/DL (ref 70–99)
GLUCOSE BLDC GLUCOMTR-MCNC: 109 MG/DL (ref 70–99)
GLUCOSE BLDC GLUCOMTR-MCNC: 110 MG/DL (ref 70–99)
GLUCOSE BLDC GLUCOMTR-MCNC: 111 MG/DL (ref 70–99)
GLUCOSE BLDC GLUCOMTR-MCNC: 111 MG/DL (ref 70–99)
GLUCOSE BLDC GLUCOMTR-MCNC: 124 MG/DL (ref 70–99)
GLUCOSE BLDC GLUCOMTR-MCNC: 143 MG/DL (ref 70–99)
GLUCOSE BLDC GLUCOMTR-MCNC: 180 MG/DL (ref 70–99)
GLUCOSE SERPL-MCNC: 104 MG/DL (ref 70–99)
HCT VFR BLD AUTO: 25.3 % (ref 35–47)
HGB BLD-MCNC: 7.7 G/DL (ref 11.7–15.7)
INR PPP: 1.15 (ref 0.85–1.15)
LACTATE SERPL-SCNC: 2 MMOL/L (ref 0.7–2)
MCH RBC QN AUTO: 32.9 PG (ref 26.5–33)
MCHC RBC AUTO-ENTMCNC: 30.4 G/DL (ref 31.5–36.5)
MCV RBC AUTO: 108 FL (ref 78–100)
PLATELET # BLD AUTO: 239 10E3/UL (ref 150–450)
POTASSIUM SERPL-SCNC: 3.5 MMOL/L (ref 3.4–5.3)
RBC # BLD AUTO: 2.34 10E6/UL (ref 3.8–5.2)
SARS-COV-2 RNA RESP QL NAA+PROBE: NEGATIVE
SODIUM SERPL-SCNC: 138 MMOL/L (ref 136–145)
WBC # BLD AUTO: 10.3 10E3/UL (ref 4–11)

## 2022-08-03 PROCEDURE — 250N000011 HC RX IP 250 OP 636: Performed by: NURSE ANESTHETIST, CERTIFIED REGISTERED

## 2022-08-03 PROCEDURE — 250N000013 HC RX MED GY IP 250 OP 250 PS 637: Performed by: COLON & RECTAL SURGERY

## 2022-08-03 PROCEDURE — 250N000011 HC RX IP 250 OP 636: Performed by: COLON & RECTAL SURGERY

## 2022-08-03 PROCEDURE — 85014 HEMATOCRIT: CPT

## 2022-08-03 PROCEDURE — 272N000001 HC OR GENERAL SUPPLY STERILE: Performed by: COLON & RECTAL SURGERY

## 2022-08-03 PROCEDURE — 710N000010 HC RECOVERY PHASE 1, LEVEL 2, PER MIN: Performed by: COLON & RECTAL SURGERY

## 2022-08-03 PROCEDURE — 999N000010 HC STATISTIC ANES STAT CODE-CRNA PER MINUTE: Performed by: COLON & RECTAL SURGERY

## 2022-08-03 PROCEDURE — 85610 PROTHROMBIN TIME: CPT | Performed by: STUDENT IN AN ORGANIZED HEALTH CARE EDUCATION/TRAINING PROGRAM

## 2022-08-03 PROCEDURE — 999N000141 HC STATISTIC PRE-PROCEDURE NURSING ASSESSMENT: Performed by: COLON & RECTAL SURGERY

## 2022-08-03 PROCEDURE — 0DJD8ZZ INSPECTION OF LOWER INTESTINAL TRACT, VIA NATURAL OR ARTIFICIAL OPENING ENDOSCOPIC: ICD-10-PCS | Performed by: COLON & RECTAL SURGERY

## 2022-08-03 PROCEDURE — 360N000075 HC SURGERY LEVEL 2, PER MIN: Performed by: COLON & RECTAL SURGERY

## 2022-08-03 PROCEDURE — 88304 TISSUE EXAM BY PATHOLOGIST: CPT | Mod: TC | Performed by: COLON & RECTAL SURGERY

## 2022-08-03 PROCEDURE — 258N000003 HC RX IP 258 OP 636: Performed by: NURSE ANESTHETIST, CERTIFIED REGISTERED

## 2022-08-03 PROCEDURE — 370N000017 HC ANESTHESIA TECHNICAL FEE, PER MIN: Performed by: COLON & RECTAL SURGERY

## 2022-08-03 PROCEDURE — C9113 INJ PANTOPRAZOLE SODIUM, VIA: HCPCS

## 2022-08-03 PROCEDURE — 250N000011 HC RX IP 250 OP 636

## 2022-08-03 PROCEDURE — 250N000013 HC RX MED GY IP 250 OP 250 PS 637: Performed by: ANESTHESIOLOGY

## 2022-08-03 PROCEDURE — 88304 TISSUE EXAM BY PATHOLOGIST: CPT | Mod: 26 | Performed by: PATHOLOGY

## 2022-08-03 PROCEDURE — 250N000013 HC RX MED GY IP 250 OP 250 PS 637: Performed by: NURSE ANESTHETIST, CERTIFIED REGISTERED

## 2022-08-03 PROCEDURE — 250N000013 HC RX MED GY IP 250 OP 250 PS 637

## 2022-08-03 PROCEDURE — 250N000025 HC SEVOFLURANE, PER MIN: Performed by: COLON & RECTAL SURGERY

## 2022-08-03 PROCEDURE — 250N000013 HC RX MED GY IP 250 OP 250 PS 637: Performed by: INTERNAL MEDICINE

## 2022-08-03 PROCEDURE — 258N000003 HC RX IP 258 OP 636: Performed by: ANESTHESIOLOGY

## 2022-08-03 PROCEDURE — 36415 COLL VENOUS BLD VENIPUNCTURE: CPT

## 2022-08-03 PROCEDURE — 250N000009 HC RX 250: Performed by: NURSE ANESTHETIST, CERTIFIED REGISTERED

## 2022-08-03 PROCEDURE — 82310 ASSAY OF CALCIUM: CPT

## 2022-08-03 PROCEDURE — 250N000013 HC RX MED GY IP 250 OP 250 PS 637: Performed by: STUDENT IN AN ORGANIZED HEALTH CARE EDUCATION/TRAINING PROGRAM

## 2022-08-03 PROCEDURE — 46260 REMOVE IN/EX HEM GROUPS 2+: CPT | Performed by: COLON & RECTAL SURGERY

## 2022-08-03 PROCEDURE — 06BY0ZC EXCISION OF HEMORRHOIDAL PLEXUS, OPEN APPROACH: ICD-10-PCS | Performed by: COLON & RECTAL SURGERY

## 2022-08-03 PROCEDURE — 120N000002 HC R&B MED SURG/OB UMMC

## 2022-08-03 PROCEDURE — 83605 ASSAY OF LACTIC ACID: CPT

## 2022-08-03 PROCEDURE — 99232 SBSQ HOSP IP/OBS MODERATE 35: CPT | Mod: GC | Performed by: PEDIATRICS

## 2022-08-03 RX ORDER — FENTANYL CITRATE 50 UG/ML
INJECTION, SOLUTION INTRAMUSCULAR; INTRAVENOUS PRN
Status: DISCONTINUED | OUTPATIENT
Start: 2022-08-03 | End: 2022-08-03

## 2022-08-03 RX ORDER — SODIUM CHLORIDE, SODIUM LACTATE, POTASSIUM CHLORIDE, CALCIUM CHLORIDE 600; 310; 30; 20 MG/100ML; MG/100ML; MG/100ML; MG/100ML
INJECTION, SOLUTION INTRAVENOUS CONTINUOUS PRN
Status: DISCONTINUED | OUTPATIENT
Start: 2022-08-03 | End: 2022-08-03

## 2022-08-03 RX ORDER — ALBUTEROL SULFATE 90 UG/1
AEROSOL, METERED RESPIRATORY (INHALATION) PRN
Status: DISCONTINUED | OUTPATIENT
Start: 2022-08-03 | End: 2022-08-03

## 2022-08-03 RX ORDER — HYDROMORPHONE HCL IN WATER/PF 6 MG/30 ML
.2-.4 PATIENT CONTROLLED ANALGESIA SYRINGE INTRAVENOUS
Status: DISCONTINUED | OUTPATIENT
Start: 2022-08-03 | End: 2022-08-05

## 2022-08-03 RX ORDER — ALBUTEROL SULFATE 0.83 MG/ML
2.5 SOLUTION RESPIRATORY (INHALATION) EVERY 4 HOURS PRN
Status: DISCONTINUED | OUTPATIENT
Start: 2022-08-03 | End: 2022-08-03 | Stop reason: HOSPADM

## 2022-08-03 RX ORDER — POLYETHYLENE GLYCOL 3350 17 G/17G
17 POWDER, FOR SOLUTION ORAL DAILY
Status: DISCONTINUED | OUTPATIENT
Start: 2022-08-03 | End: 2022-08-11 | Stop reason: HOSPADM

## 2022-08-03 RX ORDER — ONDANSETRON 4 MG/1
4 TABLET, ORALLY DISINTEGRATING ORAL EVERY 30 MIN PRN
Status: DISCONTINUED | OUTPATIENT
Start: 2022-08-03 | End: 2022-08-03 | Stop reason: HOSPADM

## 2022-08-03 RX ORDER — FENTANYL CITRATE 50 UG/ML
25-50 INJECTION, SOLUTION INTRAMUSCULAR; INTRAVENOUS EVERY 5 MIN PRN
Status: DISCONTINUED | OUTPATIENT
Start: 2022-08-03 | End: 2022-08-03 | Stop reason: HOSPADM

## 2022-08-03 RX ORDER — ONDANSETRON 2 MG/ML
INJECTION INTRAMUSCULAR; INTRAVENOUS PRN
Status: DISCONTINUED | OUTPATIENT
Start: 2022-08-03 | End: 2022-08-03

## 2022-08-03 RX ORDER — HYDROMORPHONE HYDROCHLORIDE 1 MG/ML
.2-.4 INJECTION, SOLUTION INTRAMUSCULAR; INTRAVENOUS; SUBCUTANEOUS EVERY 5 MIN PRN
Status: DISCONTINUED | OUTPATIENT
Start: 2022-08-03 | End: 2022-08-03 | Stop reason: HOSPADM

## 2022-08-03 RX ORDER — FENTANYL CITRATE 50 UG/ML
25 INJECTION, SOLUTION INTRAMUSCULAR; INTRAVENOUS
Status: DISCONTINUED | OUTPATIENT
Start: 2022-08-03 | End: 2022-08-03 | Stop reason: HOSPADM

## 2022-08-03 RX ORDER — ACETAMINOPHEN 325 MG/1
975 TABLET ORAL ONCE
Status: COMPLETED | OUTPATIENT
Start: 2022-08-03 | End: 2022-08-03

## 2022-08-03 RX ORDER — DIAZEPAM 10 MG/2ML
2.5 INJECTION, SOLUTION INTRAMUSCULAR; INTRAVENOUS
Status: DISCONTINUED | OUTPATIENT
Start: 2022-08-03 | End: 2022-08-03 | Stop reason: HOSPADM

## 2022-08-03 RX ORDER — ONDANSETRON 2 MG/ML
4 INJECTION INTRAMUSCULAR; INTRAVENOUS EVERY 30 MIN PRN
Status: DISCONTINUED | OUTPATIENT
Start: 2022-08-03 | End: 2022-08-03 | Stop reason: HOSPADM

## 2022-08-03 RX ORDER — METRONIDAZOLE 500 MG/100ML
500 INJECTION, SOLUTION INTRAVENOUS
Status: DISCONTINUED | OUTPATIENT
Start: 2022-08-03 | End: 2022-08-03 | Stop reason: HOSPADM

## 2022-08-03 RX ORDER — HYDRALAZINE HYDROCHLORIDE 20 MG/ML
2.5-5 INJECTION INTRAMUSCULAR; INTRAVENOUS EVERY 10 MIN PRN
Status: DISCONTINUED | OUTPATIENT
Start: 2022-08-03 | End: 2022-08-03 | Stop reason: HOSPADM

## 2022-08-03 RX ORDER — SODIUM CHLORIDE, SODIUM LACTATE, POTASSIUM CHLORIDE, CALCIUM CHLORIDE 600; 310; 30; 20 MG/100ML; MG/100ML; MG/100ML; MG/100ML
INJECTION, SOLUTION INTRAVENOUS CONTINUOUS
Status: DISCONTINUED | OUTPATIENT
Start: 2022-08-03 | End: 2022-08-03 | Stop reason: HOSPADM

## 2022-08-03 RX ORDER — OXYCODONE HYDROCHLORIDE 5 MG/1
5 TABLET ORAL EVERY 4 HOURS PRN
Status: DISCONTINUED | OUTPATIENT
Start: 2022-08-03 | End: 2022-08-03 | Stop reason: HOSPADM

## 2022-08-03 RX ORDER — OXYCODONE HYDROCHLORIDE 5 MG/1
5-10 TABLET ORAL
Status: DISCONTINUED | OUTPATIENT
Start: 2022-08-03 | End: 2022-08-07

## 2022-08-03 RX ORDER — PROPOFOL 10 MG/ML
INJECTION, EMULSION INTRAVENOUS PRN
Status: DISCONTINUED | OUTPATIENT
Start: 2022-08-03 | End: 2022-08-03

## 2022-08-03 RX ORDER — BUPIVACAINE HYDROCHLORIDE 2.5 MG/ML
INJECTION, SOLUTION EPIDURAL; INFILTRATION; INTRACAUDAL PRN
Status: DISCONTINUED | OUTPATIENT
Start: 2022-08-03 | End: 2022-08-03 | Stop reason: HOSPADM

## 2022-08-03 RX ORDER — CEFAZOLIN SODIUM IN 0.9 % NACL 3 G/100 ML
3 INTRAVENOUS SOLUTION, PIGGYBACK (ML) INTRAVENOUS
Status: COMPLETED | OUTPATIENT
Start: 2022-08-03 | End: 2022-08-03

## 2022-08-03 RX ORDER — METRONIDAZOLE 500 MG/1
500 TABLET ORAL 2 TIMES DAILY
Status: COMPLETED | OUTPATIENT
Start: 2022-08-03 | End: 2022-08-09

## 2022-08-03 RX ORDER — LIDOCAINE HYDROCHLORIDE 20 MG/ML
INJECTION, SOLUTION INFILTRATION; PERINEURAL PRN
Status: DISCONTINUED | OUTPATIENT
Start: 2022-08-03 | End: 2022-08-03

## 2022-08-03 RX ORDER — DEXAMETHASONE SODIUM PHOSPHATE 4 MG/ML
INJECTION, SOLUTION INTRA-ARTICULAR; INTRALESIONAL; INTRAMUSCULAR; INTRAVENOUS; SOFT TISSUE PRN
Status: DISCONTINUED | OUTPATIENT
Start: 2022-08-03 | End: 2022-08-03

## 2022-08-03 RX ORDER — LABETALOL HYDROCHLORIDE 5 MG/ML
5 INJECTION, SOLUTION INTRAVENOUS EVERY 5 MIN PRN
Status: DISCONTINUED | OUTPATIENT
Start: 2022-08-03 | End: 2022-08-03 | Stop reason: HOSPADM

## 2022-08-03 RX ORDER — ACETAMINOPHEN 325 MG/1
975 TABLET ORAL
Status: DISCONTINUED | OUTPATIENT
Start: 2022-08-03 | End: 2022-08-03 | Stop reason: HOSPADM

## 2022-08-03 RX ADMIN — LIDOCAINE HYDROCHLORIDE 100 MG: 20 INJECTION, SOLUTION INFILTRATION; PERINEURAL at 13:48

## 2022-08-03 RX ADMIN — PREGABALIN 200 MG: 100 CAPSULE ORAL at 08:16

## 2022-08-03 RX ADMIN — SUGAMMADEX 200 MG: 100 INJECTION, SOLUTION INTRAVENOUS at 14:55

## 2022-08-03 RX ADMIN — DIPHENHYDRAMINE HYDROCHLORIDE AND LIDOCAINE HYDROCHLORIDE AND ALUMINUM HYDROXIDE AND MAGNESIUM HYDRO 10 ML: KIT at 06:17

## 2022-08-03 RX ADMIN — ROCURONIUM BROMIDE 30 MG: 50 INJECTION, SOLUTION INTRAVENOUS at 14:00

## 2022-08-03 RX ADMIN — ROCURONIUM BROMIDE 50 MG: 50 INJECTION, SOLUTION INTRAVENOUS at 13:48

## 2022-08-03 RX ADMIN — METRONIDAZOLE 500 MG: 500 TABLET ORAL at 20:15

## 2022-08-03 RX ADMIN — ONDANSETRON 4 MG: 2 INJECTION INTRAMUSCULAR; INTRAVENOUS at 14:36

## 2022-08-03 RX ADMIN — FENTANYL CITRATE 50 MCG: 50 INJECTION, SOLUTION INTRAMUSCULAR; INTRAVENOUS at 15:20

## 2022-08-03 RX ADMIN — FENTANYL CITRATE 50 MCG: 50 INJECTION, SOLUTION INTRAMUSCULAR; INTRAVENOUS at 14:08

## 2022-08-03 RX ADMIN — OXYCODONE HYDROCHLORIDE 5 MG: 5 TABLET ORAL at 06:17

## 2022-08-03 RX ADMIN — SODIUM CHLORIDE, POTASSIUM CHLORIDE, SODIUM LACTATE AND CALCIUM CHLORIDE: 600; 310; 30; 20 INJECTION, SOLUTION INTRAVENOUS at 13:43

## 2022-08-03 RX ADMIN — VENLAFAXINE HYDROCHLORIDE 225 MG: 150 CAPSULE, EXTENDED RELEASE ORAL at 08:17

## 2022-08-03 RX ADMIN — OXYCODONE HYDROCHLORIDE 5 MG: 5 TABLET ORAL at 15:45

## 2022-08-03 RX ADMIN — Medication 1 TABLET: at 08:16

## 2022-08-03 RX ADMIN — ACETAMINOPHEN 975 MG: 325 TABLET, FILM COATED ORAL at 12:52

## 2022-08-03 RX ADMIN — GLYCERIN, PETROLATUM, PHENYLEPHRINE HCL, PRAMOXINE HCL: 144; 2.5; 10; 15 CREAM TOPICAL at 08:18

## 2022-08-03 RX ADMIN — METRONIDAZOLE 500 MG: 500 TABLET ORAL at 11:54

## 2022-08-03 RX ADMIN — FOLIC ACID 1 MG: 1 TABLET ORAL at 08:16

## 2022-08-03 RX ADMIN — METRONIDAZOLE 500 MG: 500 INJECTION, SOLUTION INTRAVENOUS at 12:59

## 2022-08-03 RX ADMIN — Medication 3 G: at 13:43

## 2022-08-03 RX ADMIN — OLANZAPINE 10 MG: 10 TABLET, FILM COATED ORAL at 22:01

## 2022-08-03 RX ADMIN — SUGAMMADEX 200 MG: 100 INJECTION, SOLUTION INTRAVENOUS at 15:00

## 2022-08-03 RX ADMIN — HYDROMORPHONE HYDROCHLORIDE 0.4 MG: 0.2 INJECTION, SOLUTION INTRAMUSCULAR; INTRAVENOUS; SUBCUTANEOUS at 16:52

## 2022-08-03 RX ADMIN — ALBUTEROL SULFATE 6 PUFF: 108 INHALANT RESPIRATORY (INHALATION) at 14:26

## 2022-08-03 RX ADMIN — PANTOPRAZOLE SODIUM 40 MG: 40 TABLET, DELAYED RELEASE ORAL at 08:15

## 2022-08-03 RX ADMIN — ACETAMINOPHEN 650 MG: 325 TABLET, FILM COATED ORAL at 06:17

## 2022-08-03 RX ADMIN — SODIUM CHLORIDE, POTASSIUM CHLORIDE, SODIUM LACTATE AND CALCIUM CHLORIDE 1 ML: 600; 310; 30; 20 INJECTION, SOLUTION INTRAVENOUS at 13:04

## 2022-08-03 RX ADMIN — WITCH HAZEL: 500 SOLUTION RECTAL; TOPICAL at 08:17

## 2022-08-03 RX ADMIN — DEXAMETHASONE SODIUM PHOSPHATE 6 MG: 4 INJECTION, SOLUTION INTRA-ARTICULAR; INTRALESIONAL; INTRAMUSCULAR; INTRAVENOUS; SOFT TISSUE at 14:00

## 2022-08-03 RX ADMIN — PREGABALIN 200 MG: 100 CAPSULE ORAL at 20:14

## 2022-08-03 RX ADMIN — POLYETHYLENE GLYCOL 3350 17 G: 17 POWDER, FOR SOLUTION ORAL at 18:42

## 2022-08-03 RX ADMIN — PANTOPRAZOLE SODIUM 40 MG: 40 INJECTION, POWDER, FOR SOLUTION INTRAVENOUS at 20:15

## 2022-08-03 RX ADMIN — OXYCODONE HYDROCHLORIDE 10 MG: 5 TABLET ORAL at 22:01

## 2022-08-03 RX ADMIN — SUGAMMADEX 100 MG: 100 INJECTION, SOLUTION INTRAVENOUS at 15:12

## 2022-08-03 RX ADMIN — PROPOFOL 200 MG: 10 INJECTION, EMULSION INTRAVENOUS at 13:48

## 2022-08-03 RX ADMIN — METHOCARBAMOL 1000 MG: 500 TABLET ORAL at 08:18

## 2022-08-03 RX ADMIN — FENTANYL CITRATE 100 MCG: 50 INJECTION, SOLUTION INTRAMUSCULAR; INTRAVENOUS at 13:48

## 2022-08-03 ASSESSMENT — ACTIVITIES OF DAILY LIVING (ADL)
ADLS_ACUITY_SCORE: 46
ADLS_ACUITY_SCORE: 49
ADLS_ACUITY_SCORE: 46

## 2022-08-03 ASSESSMENT — LIFESTYLE VARIABLES: TOBACCO_USE: 1

## 2022-08-03 NOTE — PROGRESS NOTES
Clinical Nutrition Services- Brief Note     Reviewed nutrition risk factors due to LOS. Pt is tolerating a Regular diet, eating well per nursing documentation. Pt is currently NPO for a procedure today and was on a CLD yesterday. Prior to yesterday, pt ordering 2-3 meals daily, intake 100% and good intake noted. No nutrition issues identified at this time. RD will continue to follow per nutrition protocol.    Weight History: 7/26 wt taken with bedscale, question accuracy.   Wt Readings from Last 10 Encounters:   08/02/22 133.1 kg (293 lb 8 oz)   07/26/22 122.3 kg (269 lb 11.2 oz)   05/13/22 129.3 kg (285 lb)   03/10/22 130.2 kg (287 lb)   02/15/22 128.4 kg (283 lb)   01/12/22 131.5 kg (290 lb)   12/29/21 131.6 kg (290 lb 3.2 oz)   12/20/21 131.2 kg (289 lb 3.2 oz)   12/01/21 131.5 kg (290 lb)   11/24/21 131.5 kg (290 lb)     Piper Churchill RDN, LD  6A/7D Pager [phone]     Advancement Flap (Double) Text: The defect edges were debeveled with a #15 scalpel blade.  Given the location of the defect and the proximity to free margins a double advancement flap was deemed most appropriate.  Using a sterile surgical marker, the appropriate advancement flaps were drawn incorporating the defect and placing the expected incisions within the relaxed skin tension lines where possible.    The area thus outlined was incised deep to adipose tissue with a #15 scalpel blade.  The skin margins were undermined to an appropriate distance in all directions utilizing iris scissors.

## 2022-08-03 NOTE — PLAN OF CARE
[id number]:  VSS on RA, afebrile. A&Ox4. C/o severe rectal pain, managed with PRN tylenol and oxycodone x2 in addition to tucks and prep H. Denies N/V, SOB. Additional Golytely 2L suspension given at 6pm, Stools cloudy brown liquid. Clear liquid diet consumed all day. NPO at midnight. BLE edema noted, pt educated on benefits of elevating legs to help reduce swelling. Covid-19 swab collected. Baseline neuropathy unchanged. Pt inquiring about infusion she gets monthly for neuropathy that she is supposed to be getting tomorrow??? Hemorrhoidectomy scheduled for tomorrow at 4pm under general anesthesia. Continue with POC.

## 2022-08-03 NOTE — ANESTHESIA POSTPROCEDURE EVALUATION
Patient: Hilaria Bonner    Procedure: Procedure(s):  EXAM UNDER ANESTHESIA, ANUS  HEMORRHOIDECTOMY, EXTERNAL       Anesthesia Type:  General    Note:  Disposition: Inpatient   Postop Pain Control: Uneventful            Sign Out: Well controlled pain   PONV: No   Neuro/Psych: Uneventful            Sign Out: Acceptable/Baseline neuro status   Airway/Respiratory: Uneventful            Sign Out: Acceptable/Baseline resp. status   CV/Hemodynamics: Uneventful            Sign Out: Acceptable CV status; No obvious hypovolemia; No obvious fluid overload   Other NRE: NONE   DID A NON-ROUTINE EVENT OCCUR? No           Last vitals:  Vitals Value Taken Time   /42 08/03/22 1600   Temp 36.5  C (97.7  F) 08/03/22 1520   Pulse 101 08/03/22 1600   Resp 11 08/03/22 1600   SpO2 100 % 08/03/22 1600   Vitals shown include unvalidated device data.    Electronically Signed By: Pacheco Ernandez MD  August 3, 2022  4:02 PM

## 2022-08-03 NOTE — PROGRESS NOTES
[facility]    Progress Note - Medicine Service, [facility] TEAM 1       Date of Admission:  7/26/2022    Assessment & Plan        Hilaria Bonner is a 31 year old female admitted on 7/26/2022. She has a history of HTN, T2DM, alcohol use, and prior PE c/b subsequent PEA arrest on Xarelto, fatty liver disease and anxiety who is admitted for hypotensive shock secondary to likely GI bleed.     Changes today:  -Hemorrhoidectomy today, went well   -OK to have normal diet  -Started on Flagyl for trichomonas infx (PCP notified team of recent PAP findings)  -IVIG tomorrow     #?GI Bleed vs. Hemorrhoids   #Acute macrocytic anemia 2/2 blood loss vs. Anemia of chronic disease  #Hypovolemic shock 2/2 blood loss vs. Dehydration, improving  P/w 3 weeks of hematochezia with associated lightheadedness and fatigue while on Xarelto. Hypotensive with improvement after repeat LR boluses. Baseline hgb wnl, on arrival 6.4, improved to 7.8 after 2 U pRBC. CTA abd/pelvis negative for active extravasation. Infectious stool panel negative. EGD/Colonoscopy performed 7/28 without evidence of acute bleed. Despite this, Hgb continues to wax and wane between 7-9 and has ongoing bloody stools. Likely source is hemorrhoids.   -Colorectal surgery following regarding hemorrhoids:    -Hemorrhoidectomy performed 8/3--went well   -Miralax added for soft stools. Has post op pain meds   -Apixaban will continue to be held. Will restart tomorrow  -BP stable  -Continue to trend hemoglobin      #. Acute on chronic sinus tachycardia--stable  Resting -115 with intermittent acceleration with ambulation. Says that this has been normal for her since her PE. Pt denies SOB, chest pain, or palpitations.  Sinus rhythm on cardiac monitoring x2 days--now stopped. No evidence for infection on exam and white count normal. Continues to be satting well on room air. Tachycardia improved to low 100's after IV hydration. Suspect  tachycardia 2/2 dehydration in the setting of ARAMIS, hypotension, and lactic acidosis vs an autonomic dysfunction.   -Will continue to monitor vitals     #. Mouth pain   C/o tongue pain over the past few days. No relief with magic mouthwash. Also has cracking/pain on corners of mouth. Could be due to dry air vs vitamin deficiency vs medication induced  -Will continue to monitor     #. Extremity Pains  #. BLE edema, non-pitting  #. Hypomagnesia   Describes vague aches and pains in her arms and legs. Different from her neuropathy. CK normal. BLE US negative 8/1.   -Continue magnesium replacement protocol     #ARAMIS - resolved  Pt presented with creatinine elevated to 2.18 on admission with baseline at 0.7-0.8. Likely due to prerenal hypotension in setting of ?GI bleed vs. dehydration. Improved with pRBC and fluid resuscitation.   -Continue to monitor with AM BMP    #Lactic acidosis - improving   Bicarb improved with volume resuscitation. Suspect contribution of elevated lactate (6/7 on admit, now down to 2.2), hypotension (resolving) and anemia (transfused).   - trend BMP    #. Trichomonas Vaginalis   Called by PCP 8/3 to report that pt's recent PAP smear came back positive for trichomonas. Pt denies any sxs.   -Started on Flagyl 500 mg BID x 7 day course    Chronic Medical Problems  #Hx of PE c/b PEA arrest in 2018  -PTA Xarelto held on arrival 2/2 concern for GI bleed. Started on apixaban 7/28 after bleed not found on EGD/C. May hold apixaban PM if Hgb continues to decline at recheck 7/30   -ECHO showed normal global and regional left ventricular function with EF of 55-60%. No changes compared to previous study.  -Will continue to monitor     #Chronic peripheral neuropathy  -PTA Pregabalin     #Type 2 diabetes  A1c on admission 5.4. Glucose in low 100's since admission   -Hold PTA metformin  -monitor on BMP daily, will increase frequency if needed    #Chronic anxiety  -Continue PTA Venlafaxine  -restarted Zyprexa      #Obstructive Sleep Apnea  -CPAP at night    #Chronic hypertension  -Hold PTA Lisinopril, hydrochlorothiazide, and Prazosin while normotensive and intermittently hypotensive.     #R foot injury   Tripped and fell from standing height on 6/29, inverting her R ankle. Seen at [facility] in [place] 6/30. R ankle XRs negative; R foot XRs with ? Calcaneal fracture at the calcaneal cuboid joint. Given CAM boot, which she has been wearing since. Continued to have R foot pain on admission. Repeat R foot XR without calcaneal fracture but there are ossific fragment projections over the proximal second metatarsal. No further workup or intervention recommended, per Ortho.   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 Appears pt has been following with neurology. Sx onset April 2020 4 weeks post-covid. NCS in 7/20 showed absent sensory responses in the upper and lower limbs and normal motor responses. Has been on maintenance IVIG q3 weeks (recently decreased to [id number]) since then and sx managed well with this per last neuro note 5/9/22.  -Will give IVIG infusion tomorrow        Diet: Regular Diet Adult    DVT Prophylaxis: VTE Prophylaxis contraindicated due to GI bleed  Gonzales Catheter: Not present  Fluids: LR fluid bolus  Central Lines: None  Cardiac Monitoring: None  Code Status: Full Code      The patient's care was discussed with the Attending Physician, Dr. Caceres, Patient and Colorectal surgery Consultant.    Jordyn Rivas DO  Medicine Service, [facility] TEAM [address] [place] [address]  Securely message with the Vocera Web Console (learn more here)  Text page via [facility] Paging/Directory   Please see signed in provider for up to date coverage information      Clinically Significant Risk Factors Present on Admission                      ______________________________________________________________________    Interval History   Doing well. Excited to have hemorrhoidectomy so she can eat. Would like to restart her home supplements. Needs IVIG infusion.     4 pt ROS otherwise negative.     Data reviewed today: I reviewed all medications, new labs and imaging results over the last 24 hours.    Physical Exam   Vital Signs: Temp: 98.2  F (36.8  C) Temp src: Oral BP: 102/68 Pulse: 93   Resp: 20 SpO2: 100 % O2 Device: None (Room air) Oxygen Delivery: 2 LPM  Weight: 304 lbs 3.2 oz  General Appearance: No acute distress  Oropharnyx: Tongue pink and smooth. No lesions. No oral lesions. Cracking around corners of mouth.   Respiratory: clear lung sounds, no increased work of breathing  Cardiovascular: regular rate and rhythm, no murmur   GI: Non distended. Soft, non tender  Extremities: 2+ pitting edema in extremities   Other: Alert and awake     Data   Recent Labs   Lab 08/03/22  1830 08/03/22  1527 08/03/22  1219 08/03/22  0632 08/03/22  0629 08/02/22  1926 08/02/22  1543 08/02/22  1422 08/02/22  0933 08/02/22  0719 08/01/22  1149 08/01/22  0556   WBC  --   --   --   --  10.3  --   --   --   --  9.8  --  9.8   HGB  --   --   --   --  7.7*  --   --  8.3*  --  7.2*  --  7.6*   MCV  --   --   --   --  108*  --   --   --   --  107*  --  106*   PLT  --   --   --   --  239  --   --   --   --  237  --  262   INR  --   --   --   --  1.15  --  1.13  --   --   --   --   --    NA  --   --   --   --  138  --   --   --   --  138  --  138   POTASSIUM  --   --   --   --  3.5  --   --   --   --  3.7  --  3.6   CHLORIDE  --   --   --   --  106  --   --   --   --  108*  --  106   CO2  --   --   --   --  21*  --   --   --   --  20*  --  19*   BUN  --   --   --   --  3.6*  --   --   --   --  6.3  --  8.0   CR  --   --   --   --  0.87  --   --   --   --  1.09*  --  1.25*   ANIONGAP  --   --   --   --  11  --   --   --   --  10  --  13   QUINCY  --   --   --   --  7.1*  --   --    --   --  7.5*  --  7.3*   * 124* 111*   < > 104*   < >  --   --    < > 124*   < > 107*   ALBUMIN  --   --   --   --   --   --   --   --   --  2.3*  --   --    PROTTOTAL  --   --   --   --   --   --   --   --   --  5.1*  --   --    BILITOTAL  --   --   --   --   --   --   --   --   --  2.1*  --   --    ALKPHOS  --   --   --   --   --   --   --   --   --  218*  --   --    ALT  --   --   --   --   --   --   --   --   --  102*  --   --    AST  --   --   --   --   --   --   --   --   --  189*  --   --     < > = values in this interval not displayed.

## 2022-08-03 NOTE — OP NOTE
Procedure Date: 08/03/2022.    PREOPERATIVE DIAGNOSES:  1.  Symptomatic mixed hemorrhoids.  2.  Acute blood loss anemia, requiring transfusion.  3.  Lower gastrointestinal bleed.  4.  Therapeutic anticoagulation.  5.  History of pulmonary embolism with cardiac arrest.  6.  Chronic inflammatory polyneuropathy.  7.  Morbid obesity (BMI 46), status post sleeve gastrectomy.  8.  History of alcohol and tobacco abuse.  9.  Major depression.  10.  Medical cannabis use.  11.  Obstructive sleep apnea.  12.  Psychosis.  13.  Diabetes mellitus type 2 (on chronic insulin).    POSTOPERATIVE DIAGNOSES:    1.  Symptomatic mixed hemorrhoids.  2.  Acute blood loss anemia, requiring transfusion.  3.  Lower gastrointestinal bleed.  4.  Therapeutic anticoagulation.  5.  History of pulmonary embolism with cardiac arrest.  6.  Chronic inflammatory polyneuropathy.  7.  Morbid obesity (BMI 46), status post sleeve gastrectomy.  8.  History of alcohol and tobacco abuse.  9.  Major depression.  10.  Medical cannabis use.  11.  Obstructive sleep apnea.  12.  Psychosis.  13.  Diabetes mellitus type 2 (on chronic insulin).    ANESTHESIA:  General endotracheal anesthesia plus local anesthetic.    PROCEDURES PERFORMED:     1.  LigaSure hemorrhoidectomy x 3.  2.  Proctoplasty.  3.  Anoplasty.    SURGEON:  Chip Way MD.    ASSISTANT:  None.    BRIEF HISTORY:  Hilaria is a 31-year-old young lady with extensive past medical history, who has a history of a cardiac arrest related to a PE back in 2018.  She has been on therapeutic anticoagulation since then and has been experiencing hematochezia intermittently.  She was recently admitted to the hospital with acute blood loss anemia and a lower gastrointestinal bleed.  Upper and lower endoscopies were performed, and these did not reveal any active bleeding.  She did have very large mixed hemorrhoids at that time.  She also underwent a CT angiogram that showed no evidence of bleeding as well.   "Her anticoagulation was held and we were consulted to assess for hemorrhoidal bleeding.  This was the likely cause of her bleed, given her therapeutic anticoagulation.  I did discuss with her that this could be unrelated, but the only way for us to know would be by performing the operation.  She was also very symptomatic from these mixed hemorrhoids as well.  I thoroughly discussed the risks, benefits and alternatives of operative treatment with Hilaria and she agreed to proceed.    DESCRIPTION OF OPERATION:  After obtaining informed consent, the patient was brought to the operating room and placed in the supine position.  General endotracheal anesthesia was gently induced without difficulty.  She received appropriate preoperative antibiotic prophylaxis, as well as mechanical DVT prophylaxis.  Bilateral lower extremity pneumatic compression devices were applied, and all pressure points were cushioned.  The patient was then turned to the prone jackknife position.  The perianal area was prepped and draped in the standard sterile fashion.  A \"timeout\" was performed.  A total of 30 mL of bupivacaine 0.25% without epinephrine was injected as a pudendal block.  A digital rectal exam, as well as anoscopy were performed, and these revealed large external and internal hemorrhoid columns, but there was no evidence of thrombosis or active bleeding.  All 3 of these columns were excised in the following fashion:  The perianal skin was incised and the hemorrhoid column was dissected off of the anal sphincter complex.  The hemorrhoidectomy was completed with a handheld LigaSure device.  All wounds were reapproximated with running locking 3-0 Vicryl sutures.  Hemostasis was corroborated.  There were no additional findings.  Instrument, sponge, and needle counts were all correct as reported to me.  Silvadene was applied to the wounds, as well as a sterile dressing.  The patient tolerated the procedure well.    COMPLICATIONS:  None " immediately.    ESTIMATED BLOOD LOSS:  25 mL.    REPLACEMENT:  200 mL of crystalloid.    DRAINS AND TUBES:  None.    SPECIMENS:  Left lateral, right posterior and right anterior hemorrhoid columns.    FINDINGS:  Large mixed hemorrhoids with no evidence of active bleeding or thrombosis.    DISPOSITION:  PACU.    Chip Way MD        D: 2022   T: 2022   MT: WHITNEY    Name:     RADHA JOHANSEN  MRN:      [id number]        Account:        [id number]   :      1990           Procedure Date: 2022     Document: [id number]    cc:  KATTY Diamond MD Deborah S. Cavanaugh, MD Nicholas Lim Chow Tom, MD Matthew D. Yocum, MD

## 2022-08-03 NOTE — PLAN OF CARE
[id number]:    VSS on RA. Denies nausea and SOB. Pain 9/10. PRN oxycodone given x1 and PRN tylenol given x1. C/o mouth pain, magic mouthwash given. Stools very light brown. Continues drinking Golytely. Covid swab negative. BLE edema. Legs elevated to help reduce swelling.  and 111, no coverage needed. NPO for hemorrhoidectomy today at 1600. Continue with poc.

## 2022-08-03 NOTE — TELEPHONE ENCOUNTER
7/26/22 NIL pap, + HR HPV (not 16 or 18), also showing Trichomonas vaginalis. Plan: msg sent to provider to manage Trich. cotest due in 1 yr.

## 2022-08-03 NOTE — OR NURSING
Dr. Ernandez at bedside and ok for pt to return to her room on 7D.  He will s/o patient.    Sue RODRÍGUEZ RN

## 2022-08-03 NOTE — PROVIDER NOTIFICATION
Paged text sent to Dr. Rob Cobb at #[phone]    pt. back on the unit, following surgery. Kayla sent up with pt. but no order place. Also want to confirm if pt is ok to order food under ordered diet. Thanks

## 2022-08-03 NOTE — ANESTHESIA PREPROCEDURE EVALUATION
Anesthesia Pre-Procedure Evaluation    Patient: Hilaria Bonner   MRN: [id number] : 1990        Procedure : Procedure(s):  EXAM UNDER ANESTHESIA, ANUS  HEMORRHOIDECTOMY, EXTERNAL          Past Medical History:   Diagnosis Date     Acute kidney injury (H) 2019     Acute massive pulmonary embolism (H) 2019     Acute pancreatitis 2018     Acute pancreatitis 2021    due to ETOH     Acute thoracic back pain 2021     ARAMIS (acute kidney injury) (H) 2019     Cardiac arrest, cause unspecified (H) 2019    massive pulmonary embolism with pulseless electrical activity cardiac arrest in May 2019 following gastric bypass in 2019      Depression with anxiety      GERD (gastroesophageal reflux disease)      History of alcohol use disorder      HTN (hypertension)      Infection due to 2019 novel coronavirus 2020    COVID19 infection in 2020     Ischemic colitis (H) 2019     Morbid obesity (H)      Scalp laceration, initial encounter 2020      Past Surgical History:   Procedure Laterality Date      SECTION       COLONOSCOPY N/A 2022    Procedure: COLONOSCOPY;  Surgeon: Chandrakant Toledo MD;  Location:  GI     ESOPHAGOSCOPY, GASTROSCOPY, DUODENOSCOPY (EGD), COMBINED N/A 2022    Procedure: ESOPHAGOGASTRODUODENOSCOPY, WITH BIOPSY;  Surgeon: Chandrakant Toledo MD;  Location: [facility]     LAPAROSCOPIC GASTRIC SLEEVE N/A 2019    Procedure: Laparoscopic Sleeve Gastrectomy;  Surgeon: Luan Lopez MD;  Location: [facility] OR     ORTHOPEDIC SURGERY      2 knee meniscus surgery      No Known Allergies   Social History     Tobacco Use     Smoking status: Current Every Day Smoker     Packs/day: 0.25     Years: 1.00     Pack years: 0.25     Types: Cigarettes     Start date: 2016     Smokeless tobacco: Never Used   Substance Use Topics     Alcohol use: Not Currently     Comment: Quit drinking when last hospitalized      Wt Readings from Last 1  Encounters:   08/03/22 138 kg (304 lb 3.2 oz)        Anesthesia Evaluation   Pt has had prior anesthetic. Type: General.    No history of anesthetic complications       ROS/MED HX  ENT/Pulmonary:     (+) sleep apnea, uses CPAP, tobacco use, Current use,     Neurologic:       Cardiovascular:     (+) hypertension-----Taking blood thinners     METS/Exercise Tolerance:     Hematologic: Comments: PE with PEA arrest previously    (+) History of blood clots, pt is anticoagulated,     Musculoskeletal:       GI/Hepatic:     (+) GERD, Asymptomatic on medication,     Renal/Genitourinary:     (+) renal disease, type: ARF,     Endo:     (+) type II DM, Obesity,     Psychiatric/Substance Use:     (+) psychiatric history anxiety and depression     Infectious Disease:       Malignancy:       Other:            Physical Exam    Airway        Mallampati: III   TM distance: > 3 FB   Neck ROM: full   Mouth opening: < 3 cm    Respiratory Devices and Support         Dental  no notable dental history         Cardiovascular             Pulmonary               Other findings: Piercings removed in pre-op    OUTSIDE LABS:  CBC:   Lab Results   Component Value Date    WBC 10.3 08/03/2022    WBC 9.8 08/02/2022    HGB 7.7 (L) 08/03/2022    HGB 8.3 (L) 08/02/2022    HCT 25.3 (L) 08/03/2022    HCT 23.1 (L) 08/02/2022     08/03/2022     08/02/2022     BMP:   Lab Results   Component Value Date     08/03/2022     08/02/2022    POTASSIUM 3.5 08/03/2022    POTASSIUM 3.7 08/02/2022    CHLORIDE 106 08/03/2022    CHLORIDE 108 (H) 08/02/2022    CO2 21 (L) 08/03/2022    CO2 20 (L) 08/02/2022    BUN 3.6 (L) 08/03/2022    BUN 6.3 08/02/2022    CR 0.87 08/03/2022    CR 1.09 (H) 08/02/2022     (H) 08/03/2022     (H) 08/03/2022     (H) 08/03/2022     COAGS:   Lab Results   Component Value Date    PTT 32 05/30/2020    INR 1.15 08/03/2022     POC:   Lab Results   Component Value Date    BGM 82 06/05/2020    HCG  Negative 07/26/2022    HCGS Negative 05/30/2020     HEPATIC:   Lab Results   Component Value Date    ALBUMIN 2.3 (L) 08/02/2022    PROTTOTAL 5.1 (L) 08/02/2022     (H) 08/02/2022     (H) 08/02/2022     (H) 11/04/2021    ALKPHOS 218 (H) 08/02/2022    BILITOTAL 2.1 (H) 08/02/2022     OTHER:   Lab Results   Component Value Date    PH 7.37 07/26/2022    LACT 2.2 (H) 07/27/2022    A1C 5.4 07/26/2022    QUINCY 7.1 (L) 08/03/2022    PHOS 2.7 07/31/2022    MAG 2.0 08/02/2022    LIPASE 258 02/15/2022    AMYLASE 78 02/15/2022    TSH 3.90 07/26/2022    CRP 3.6 10/22/2020    SED 33 (H) 05/30/2020       Anesthesia Plan    ASA Status:  3   NPO Status:  NPO Appropriate    Anesthesia Type: General.     - Airway: ETT   Induction: Intravenous, Propofol.   Maintenance: Balanced.        Consents    Anesthesia Plan(s) and associated risks, benefits, and realistic alternatives discussed. Questions answered and patient/representative(s) expressed understanding.    - Discussed:     - Discussed with:  Patient      - Extended Intubation/Ventilatory Support Discussed: No.      - Patient is DNR/DNI Status: No         Postoperative Care    Pain management: IV analgesics, Oral pain medications, Multi-modal analgesia.   PONV prophylaxis: Ondansetron (or other 5HT-3), Dexamethasone or Solumedrol     Comments:           H&P reviewed: Unable to attach H&P to encounter due to EHR limitations. H&P Update: appropriate H&P reviewed, patient examined. No interval changes since H&P (within 30 days).         Pacheco Ernandez MD

## 2022-08-03 NOTE — PLAN OF CARE
Goal Outcome Evaluation:Ongoing  Alert and oriented, able to make needs known. On room air, denies shortness of breath. Pain/discomfort in both legs. PRN robaxin given. Has + 2/3 edema in legs. BG checks no insulin needed this shift. PIV saline locked.   Went down for hemorrhoidectomy.

## 2022-08-03 NOTE — TELEPHONE ENCOUNTER
Pap with trichomonas present on pap smear- abnormal pap as well NIL + HPV- cotest in one year   Called [facility] (patient currently inpatient) to notify of results   Provider notified.

## 2022-08-03 NOTE — ANESTHESIA CARE TRANSFER NOTE
Patient: Hilaria Bonner    Procedure: Procedure(s):  EXAM UNDER ANESTHESIA, ANUS  HEMORRHOIDECTOMY, EXTERNAL       Diagnosis: External hemorrhoids [K64.4]  Diagnosis Additional Information: No value filed.    Anesthesia Type:   General     Note:    Oropharynx: oropharynx clear of all foreign objects and spontaneously breathing  Level of Consciousness: awake  Oxygen Supplementation: nasal cannula  Level of Supplemental Oxygen (L/min / FiO2): 2  Independent Airway: airway patency satisfactory and stable  Dentition: dentition unchanged  Vital Signs Stable: post-procedure vital signs reviewed and stable  Report to RN Given: handoff report given  Patient transferred to: PACU    Handoff Report: Identifed the Patient, Identified the Reponsible Provider, Reviewed the pertinent medical history, Discussed the surgical course, Reviewed Intra-OP anesthesia mangement and issues during anesthesia, Set expectations for post-procedure period and Allowed opportunity for questions and acknowledgement of understanding      Vitals:  Vitals Value Taken Time   /73 08/03/22 1518   Temp     Pulse 106 08/03/22 1528   Resp 10 08/03/22 1528   SpO2 100 % 08/03/22 1528   Vitals shown include unvalidated device data.    Electronically Signed By: RG Nix CRNA  August 3, 2022  3:29 PM

## 2022-08-04 ENCOUNTER — APPOINTMENT (OUTPATIENT)
Dept: PHYSICAL THERAPY | Facility: CLINIC | Age: 32
End: 2022-08-04
Payer: COMMERCIAL

## 2022-08-04 ENCOUNTER — APPOINTMENT (OUTPATIENT)
Dept: OCCUPATIONAL THERAPY | Facility: CLINIC | Age: 32
End: 2022-08-04
Payer: COMMERCIAL

## 2022-08-04 LAB
ABO/RH(D): NORMAL
ANION GAP SERPL CALCULATED.3IONS-SCNC: 11 MMOL/L (ref 7–15)
ANTIBODY SCREEN: NEGATIVE
BLD PROD TYP BPU: NORMAL
BLOOD COMPONENT TYPE: NORMAL
BUN SERPL-MCNC: 3.3 MG/DL (ref 6–20)
CALCIUM SERPL-MCNC: 7.2 MG/DL (ref 8.6–10)
CHLORIDE SERPL-SCNC: 107 MMOL/L (ref 98–107)
CODING SYSTEM: NORMAL
CREAT SERPL-MCNC: 0.95 MG/DL (ref 0.51–0.95)
CROSSMATCH: NORMAL
DEPRECATED HCO3 PLAS-SCNC: 20 MMOL/L (ref 22–29)
GFR SERPL CREATININE-BSD FRML MDRD: 82 ML/MIN/1.73M2
GLUCOSE BLDC GLUCOMTR-MCNC: 124 MG/DL (ref 70–99)
GLUCOSE BLDC GLUCOMTR-MCNC: 128 MG/DL (ref 70–99)
GLUCOSE BLDC GLUCOMTR-MCNC: 128 MG/DL (ref 70–99)
GLUCOSE BLDC GLUCOMTR-MCNC: 149 MG/DL (ref 70–99)
GLUCOSE BLDC GLUCOMTR-MCNC: 183 MG/DL (ref 70–99)
GLUCOSE SERPL-MCNC: 147 MG/DL (ref 70–99)
HGB BLD-MCNC: 7.5 G/DL (ref 11.7–15.7)
ISSUE DATE AND TIME: NORMAL
POTASSIUM SERPL-SCNC: 3.8 MMOL/L (ref 3.4–5.3)
SODIUM SERPL-SCNC: 138 MMOL/L (ref 136–145)
SPECIMEN EXPIRATION DATE: NORMAL
UNIT ABO/RH: NORMAL
UNIT NUMBER: NORMAL
UNIT STATUS: NORMAL
UNIT TYPE ISBT: 6200

## 2022-08-04 PROCEDURE — 86923 COMPATIBILITY TEST ELECTRIC: CPT | Performed by: STUDENT IN AN ORGANIZED HEALTH CARE EDUCATION/TRAINING PROGRAM

## 2022-08-04 PROCEDURE — 97530 THERAPEUTIC ACTIVITIES: CPT | Mod: GP

## 2022-08-04 PROCEDURE — 250N000009 HC RX 250

## 2022-08-04 PROCEDURE — 250N000013 HC RX MED GY IP 250 OP 250 PS 637: Performed by: COLON & RECTAL SURGERY

## 2022-08-04 PROCEDURE — 36416 COLLJ CAPILLARY BLOOD SPEC: CPT

## 2022-08-04 PROCEDURE — 250N000013 HC RX MED GY IP 250 OP 250 PS 637: Performed by: STUDENT IN AN ORGANIZED HEALTH CARE EDUCATION/TRAINING PROGRAM

## 2022-08-04 PROCEDURE — 97535 SELF CARE MNGMENT TRAINING: CPT | Mod: GO

## 2022-08-04 PROCEDURE — 97530 THERAPEUTIC ACTIVITIES: CPT | Mod: GO

## 2022-08-04 PROCEDURE — 86923 COMPATIBILITY TEST ELECTRIC: CPT

## 2022-08-04 PROCEDURE — 250N000011 HC RX IP 250 OP 636

## 2022-08-04 PROCEDURE — P9016 RBC LEUKOCYTES REDUCED: HCPCS

## 2022-08-04 PROCEDURE — 97116 GAIT TRAINING THERAPY: CPT | Mod: GP

## 2022-08-04 PROCEDURE — 86850 RBC ANTIBODY SCREEN: CPT

## 2022-08-04 PROCEDURE — 250N000011 HC RX IP 250 OP 636: Performed by: COLON & RECTAL SURGERY

## 2022-08-04 PROCEDURE — 82310 ASSAY OF CALCIUM: CPT

## 2022-08-04 PROCEDURE — 86901 BLOOD TYPING SEROLOGIC RH(D): CPT

## 2022-08-04 PROCEDURE — C9113 INJ PANTOPRAZOLE SODIUM, VIA: HCPCS

## 2022-08-04 PROCEDURE — 85018 HEMOGLOBIN: CPT

## 2022-08-04 PROCEDURE — 99232 SBSQ HOSP IP/OBS MODERATE 35: CPT | Mod: GC | Performed by: PEDIATRICS

## 2022-08-04 PROCEDURE — 250N000013 HC RX MED GY IP 250 OP 250 PS 637

## 2022-08-04 PROCEDURE — 36415 COLL VENOUS BLD VENIPUNCTURE: CPT

## 2022-08-04 PROCEDURE — 120N000002 HC R&B MED SURG/OB UMMC

## 2022-08-04 RX ORDER — ACETAMINOPHEN 325 MG/1
650 TABLET ORAL ONCE
Status: COMPLETED | OUTPATIENT
Start: 2022-08-04 | End: 2022-08-04

## 2022-08-04 RX ORDER — LIDOCAINE HYDROCHLORIDE 20 MG/ML
JELLY TOPICAL ONCE
Status: DISCONTINUED | OUTPATIENT
Start: 2022-08-04 | End: 2022-08-11 | Stop reason: HOSPADM

## 2022-08-04 RX ORDER — PRAZOSIN HYDROCHLORIDE 1 MG/1
1 CAPSULE ORAL AT BEDTIME
Status: DISCONTINUED | OUTPATIENT
Start: 2022-08-04 | End: 2022-08-11 | Stop reason: HOSPADM

## 2022-08-04 RX ORDER — DIPHENHYDRAMINE HYDROCHLORIDE 50 MG/ML
50 INJECTION INTRAMUSCULAR; INTRAVENOUS ONCE
Status: COMPLETED | OUTPATIENT
Start: 2022-08-04 | End: 2022-08-04

## 2022-08-04 RX ORDER — DIPHENHYDRAMINE HYDROCHLORIDE 50 MG/ML
50 INJECTION INTRAMUSCULAR; INTRAVENOUS
Status: DISCONTINUED | OUTPATIENT
Start: 2022-08-04 | End: 2022-08-05

## 2022-08-04 RX ORDER — DIPHENHYDRAMINE HCL 50 MG
50 CAPSULE ORAL
Status: DISCONTINUED | OUTPATIENT
Start: 2022-08-04 | End: 2022-08-05

## 2022-08-04 RX ORDER — ACETAMINOPHEN 325 MG/1
650 TABLET ORAL
Status: DISCONTINUED | OUTPATIENT
Start: 2022-08-04 | End: 2022-08-06

## 2022-08-04 RX ORDER — LIDOCAINE HYDROCHLORIDE 20 MG/ML
JELLY TOPICAL ONCE
Status: COMPLETED | OUTPATIENT
Start: 2022-08-04 | End: 2022-08-04

## 2022-08-04 RX ORDER — SALIVA STIMULANT COMB. NO.3
1 SPRAY, NON-AEROSOL (ML) MUCOUS MEMBRANE 4 TIMES DAILY PRN
Status: DISCONTINUED | OUTPATIENT
Start: 2022-08-04 | End: 2022-08-06

## 2022-08-04 RX ORDER — DIPHENHYDRAMINE HCL 50 MG
50 CAPSULE ORAL ONCE
Status: COMPLETED | OUTPATIENT
Start: 2022-08-04 | End: 2022-08-04

## 2022-08-04 RX ORDER — METHYLPREDNISOLONE SODIUM SUCCINATE 125 MG/2ML
125 INJECTION, POWDER, LYOPHILIZED, FOR SOLUTION INTRAMUSCULAR; INTRAVENOUS
Status: DISCONTINUED | OUTPATIENT
Start: 2022-08-04 | End: 2022-08-05

## 2022-08-04 RX ADMIN — PREGABALIN 200 MG: 100 CAPSULE ORAL at 08:27

## 2022-08-04 RX ADMIN — PREGABALIN 200 MG: 100 CAPSULE ORAL at 14:08

## 2022-08-04 RX ADMIN — VENLAFAXINE HYDROCHLORIDE 225 MG: 150 CAPSULE, EXTENDED RELEASE ORAL at 08:31

## 2022-08-04 RX ADMIN — ANORECTAL OINTMENT: 15.7; .44; 24; 20.6 OINTMENT TOPICAL at 08:34

## 2022-08-04 RX ADMIN — OLANZAPINE 10 MG: 10 TABLET, FILM COATED ORAL at 22:05

## 2022-08-04 RX ADMIN — DIPHENHYDRAMINE HYDROCHLORIDE AND LIDOCAINE HYDROCHLORIDE AND ALUMINUM HYDROXIDE AND MAGNESIUM HYDRO 10 ML: KIT at 07:18

## 2022-08-04 RX ADMIN — Medication 1 SPRAY: at 22:22

## 2022-08-04 RX ADMIN — METRONIDAZOLE 500 MG: 500 TABLET ORAL at 08:31

## 2022-08-04 RX ADMIN — PANTOPRAZOLE SODIUM 40 MG: 40 INJECTION, POWDER, FOR SOLUTION INTRAVENOUS at 08:37

## 2022-08-04 RX ADMIN — LIDOCAINE HYDROCHLORIDE: 20 JELLY TOPICAL at 03:56

## 2022-08-04 RX ADMIN — OXYCODONE HYDROCHLORIDE 10 MG: 5 TABLET ORAL at 08:27

## 2022-08-04 RX ADMIN — METRONIDAZOLE 500 MG: 500 TABLET ORAL at 20:15

## 2022-08-04 RX ADMIN — WITCH HAZEL: 500 SOLUTION RECTAL; TOPICAL at 20:24

## 2022-08-04 RX ADMIN — OXYCODONE HYDROCHLORIDE 10 MG: 5 TABLET ORAL at 19:01

## 2022-08-04 RX ADMIN — ACETAMINOPHEN 650 MG: 325 TABLET, FILM COATED ORAL at 17:02

## 2022-08-04 RX ADMIN — PRAZOSIN HYDROCHLORIDE 1 MG: 1 CAPSULE ORAL at 22:05

## 2022-08-04 RX ADMIN — POLYETHYLENE GLYCOL 3350 17 G: 17 POWDER, FOR SOLUTION ORAL at 08:34

## 2022-08-04 RX ADMIN — ANORECTAL OINTMENT: 15.7; .44; 24; 20.6 OINTMENT TOPICAL at 20:24

## 2022-08-04 RX ADMIN — HYDROMORPHONE HYDROCHLORIDE 0.4 MG: 0.2 INJECTION, SOLUTION INTRAMUSCULAR; INTRAVENOUS; SUBCUTANEOUS at 03:10

## 2022-08-04 RX ADMIN — WITCH HAZEL: 500 SOLUTION RECTAL; TOPICAL at 08:36

## 2022-08-04 RX ADMIN — DIPHENHYDRAMINE HYDROCHLORIDE 50 MG: 50 CAPSULE ORAL at 17:02

## 2022-08-04 RX ADMIN — PANTOPRAZOLE SODIUM 40 MG: 40 INJECTION, POWDER, FOR SOLUTION INTRAVENOUS at 20:16

## 2022-08-04 RX ADMIN — OXYCODONE HYDROCHLORIDE 10 MG: 5 TABLET ORAL at 12:10

## 2022-08-04 RX ADMIN — HUMAN IMMUNOGLOBULIN G 60 G: 40 LIQUID INTRAVENOUS at 17:39

## 2022-08-04 RX ADMIN — ONDANSETRON 4 MG: 4 TABLET, ORALLY DISINTEGRATING ORAL at 23:26

## 2022-08-04 RX ADMIN — WITCH HAZEL: 500 SOLUTION RECTAL; TOPICAL at 17:07

## 2022-08-04 RX ADMIN — PREGABALIN 200 MG: 100 CAPSULE ORAL at 20:15

## 2022-08-04 RX ADMIN — Medication 1 TABLET: at 08:31

## 2022-08-04 RX ADMIN — APIXABAN 5 MG: 5 TABLET, FILM COATED ORAL at 20:16

## 2022-08-04 RX ADMIN — FOLIC ACID 1 MG: 1 TABLET ORAL at 08:31

## 2022-08-04 RX ADMIN — HYDROMORPHONE HYDROCHLORIDE 0.4 MG: 0.2 INJECTION, SOLUTION INTRAMUSCULAR; INTRAVENOUS; SUBCUTANEOUS at 22:23

## 2022-08-04 RX ADMIN — HYDROMORPHONE HYDROCHLORIDE 0.2 MG: 0.2 INJECTION, SOLUTION INTRAMUSCULAR; INTRAVENOUS; SUBCUTANEOUS at 14:16

## 2022-08-04 RX ADMIN — PSYLLIUM HUSK 1 PACKET: 3.4 POWDER ORAL at 08:34

## 2022-08-04 ASSESSMENT — ACTIVITIES OF DAILY LIVING (ADL)
ADLS_ACUITY_SCORE: 48
ADLS_ACUITY_SCORE: 48
ADLS_ACUITY_SCORE: 49
ADLS_ACUITY_SCORE: 48
ADLS_ACUITY_SCORE: 49
ADLS_ACUITY_SCORE: 48
ADLS_ACUITY_SCORE: 49
ADLS_ACUITY_SCORE: 48

## 2022-08-04 NOTE — PLAN OF CARE
Goal Outcome Evaluation: Ongoing progressing.  Alert and oriented, able to make needs known. On room air, denies shortness of breath. Pain/discomfort to buttocks. PRN oxycodone and IV dilaudid given. Has + 2/3 edema in legs. BG checks, had one unit of insulin this shift. PIV saline locked.

## 2022-08-04 NOTE — PROGRESS NOTES
[facility]    Progress Note - Medicine Service, [facility] TEAM 1       Date of Admission:  7/26/2022    Assessment & Plan        Hilaria Bonner is a 31 year old female admitted on 7/26/2022. She has a history of HTN, T2DM, alcohol use, and prior PE c/b subsequent PEA arrest on Xarelto, fatty liver disease and anxiety who is admitted for hypotensive shock and anemia likely secondary to hemorrhoidal bleeding.     Changes today:  -Urinary retention of 400 ml overnight. Straight cath failed. Urology consulted and dougherty placed.   -Plan to pull dougherty at 2200 and bladder scan q8 starting at 6 AM if pt unable to void.  -Hgb 6.9 this AM (per overnight team--hgb was not recorded by lab). Recheck Hgb 7.5.  -Restarted apixaban 5 mg BID for DVT/PE prophylaxis. Will monitor for 24 hrs  -IVIG infusion today     #?GI Bleed vs. Hemorrhoids   #Acute macrocytic anemia 2/2 blood loss vs. Anemia of chronic disease  #Hypovolemic shock 2/2 blood loss vs. Dehydration, improving  P/w 3 weeks of hematochezia with associated lightheadedness and fatigue while on Xarelto. Hypotensive with improvement after repeat LR boluses. Baseline hgb wnl, on arrival 6.4, improved to 7.8 after 2 U pRBC. CTA abd/pelvis negative for active extravasation. Infectious stool panel negative. EGD/Colonoscopy performed 7/28 without evidence of acute bleed. Despite this, Hgb continues to wax and wane between 7-9 and has ongoing bloody stools. Likely source is hemorrhoids.   -Colorectal surgery following:   -Hemorrhoidectomy performed 8/3--went well   -Miralax added for soft stools. Continue metamucil.    -Continue sitz baths 3-4x daily and tuck 4x4 gauze    -Will restart apixaban today and monitor for 24 hours   -BP stable  -AM CBC checks    #. Acute urinary retention  400 ml of urine retention post-hemorrhoidectomy, likely 2/2 inflammation/swelling from procedure. Straight cath failed. Urology consulted for indwelling  dougherty placement--placed at 0540 today. Now with good urine output   -Will pull dougherty tonight at 10 PM. Will bladder scan q8 starting at 6 AM if pt unable to void    #. Acute on chronic sinus tachycardia--stable  Resting -115 with intermittent acceleration with ambulation. Says that this has been normal for her since her PE. Pt denies SOB, chest pain, or palpitations.  Sinus rhythm on cardiac monitoring x2 days--now stopped. No evidence for infection on exam and white count normal. Continues to be satting well on room air. Tachycardia improved to low 100's after IV hydration. Suspect tachycardia 2/2 dehydration in the setting of ARAMIS, hypotension, and lactic acidosis vs an autonomic dysfunction.   -Will continue to monitor vitals     #. Mouth pain   C/o tongue pain over the past few days. No relief with magic mouthwash. Also has cracking/pain on corners of mouth. Could be due to dry air vs vitamin deficiency vs medication induced  -B2 level ordered  -Biotene spray 4x daily PRN  -Vaseline for lips/corners of mouth     #. Extremity Pains  #. BLE edema, non-pitting  #. Hypomagnesia   Describes vague aches and pains in her arms and legs. Different from her neuropathy. CK normal. BLE US negative 8/1.   -Continue magnesium replacement protocol     #ARAMIS - resolved  Pt presented with creatinine elevated to 2.18 on admission with baseline at 0.7-0.8. Likely due to prerenal hypotension in setting of ?GI bleed vs. dehydration. Improved with pRBC and fluid resuscitation.   -Continue to monitor with AM BMP    #Lactic acidosis - improving   Bicarb improved with volume resuscitation. Suspect contribution of elevated lactate (6/7 on admit, now down to 2.2), hypotension (resolving) and anemia (transfused).   - trend BMP    #. Trichomonas Vaginalis   Called by PCP 8/3 to report that pt's recent PAP smear came back positive for trichomonas. Pt denies any sxs.   -Started on Flagyl 500 mg BID x 7 day course    Chronic Medical  Problems  #Hx of PE c/b PEA arrest in 2018  -PTA Xarelto held on arrival 2/2 concern for GI bleed. Started on apixaban 7/28 after bleed not found on EGD/C. May hold apixaban PM if Hgb continues to decline at recheck 7/30   -ECHO showed normal global and regional left ventricular function with EF of 55-60%. No changes compared to previous study.  -Will continue to monitor     #Chronic peripheral neuropathy  -PTA Pregabalin     #Type 2 diabetes  A1c on admission 5.4. Glucose in low 100's since admission   -Hold PTA metformin  -monitor on BMP daily, will increase frequency if needed    #Chronic anxiety  -Continue PTA Venlafaxine  -restarted Zyprexa     #Obstructive Sleep Apnea  -CPAP at night    #Chronic hypertension  -Hold PTA Lisinopril, hydrochlorothiazide, and Prazosin while normotensive and intermittently hypotensive.     #R foot injury   Tripped and fell from standing height on 6/29, inverting her R ankle. Seen at [facility] in [place] 6/30. R ankle XRs negative; R foot XRs with ? Calcaneal fracture at the calcaneal cuboid joint. Given CAM boot, which she has been wearing since. Continued to have R foot pain on admission. Repeat R foot XR without calcaneal fracture but there are ossific fragment projections over the proximal second metatarsal. No further workup or intervention recommended, per Ortho.   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 Appears pt has been following with neurology. Sx onset April 2020 4 weeks post-covid. NCS in 7/20 showed absent sensory responses in the upper and lower limbs and normal motor responses. Has been on maintenance IVIG q3 weeks (recently decreased to [id number]) since then and sx managed well with this per last neuro note 5/9/22.  -IVIG infusion ordered       Diet: Regular Diet Adult    DVT Prophylaxis: apixaban 5 mg BID  Gonzales Catheter: PRESENT, indication:  Retention  Fluids: LR fluid bolus  Central Lines: None  Cardiac Monitoring: None  Code Status: Full Code      The patient's care was discussed with the Attending Physician, Dr. Caceres, Patient and Colorectal surgery Consultant.    Jordyn Rivas, DO  Medicine Service, [facility] TEAM [address] [place] [address]  Securely message with the Vocera Web Console (learn more here)  Text page via [facility] Paging/Directory   Please see signed in provider for up to date coverage information      Clinically Significant Risk Factors Present on Admission                     ______________________________________________________________________    Interval History   Doing well. Does have rectal pain and urethral pain (due to dougherty). Denies any chest pain, shortness of breath, or abdominal pain. Continues to have mouth pain. Using Vaseline for dry lips/corners of mouth.     4 pt ROS otherwise negative.     Data reviewed today: I reviewed all medications, new labs and imaging results over the last 24 hours.    Physical Exam   Vital Signs: Temp: 97.9  F (36.6  C) Temp src: Oral BP: 125/64 Pulse: 119   Resp: 20 SpO2: 100 % O2 Device: None (Room air) Oxygen Delivery: 2 LPM  Weight: 309 lbs 14.4 oz  General Appearance: No acute distress   Respiratory: clear lung sounds, no increased work of breathing  Cardiovascular: regular rate and rhythm, no murmur   GI: Non distended. Soft, non tender  Extremities: 2+ pitting edema in extremities   Other: Alert and awake     Data   Recent Labs   Lab 08/04/22  1214 08/04/22  0755 08/04/22  0503 08/03/22  0632 08/03/22  0629 08/02/22  1926 08/02/22  1543 08/02/22  1422 08/02/22  0933 08/02/22  0719 08/01/22  1149 08/01/22  0556   WBC  --   --   --   --  10.3  --   --   --   --  9.8  --  9.8   HGB  --   --   --   --  7.7*  --   --  8.3*  --  7.2*  --  7.6*   MCV  --   --   --   --  108*  --   --   --   --  107*  --  106*   PLT  --   --   --   --  239  --   --   --   --  237  --   262   INR  --   --   --   --  1.15  --  1.13  --   --   --   --   --    NA  --   --  138  --  138  --   --   --   --  138  --  138   POTASSIUM  --   --  3.8  --  3.5  --   --   --   --  3.7  --  3.6   CHLORIDE  --   --  107  --  106  --   --   --   --  108*  --  106   CO2  --   --  20*  --  21*  --   --   --   --  20*  --  19*   BUN  --   --  3.3*  --  3.6*  --   --   --   --  6.3  --  8.0   CR  --   --  0.95  --  0.87  --   --   --   --  1.09*  --  1.25*   ANIONGAP  --   --  11  --  11  --   --   --   --  10  --  13   QUINCY  --   --  7.2*  --  7.1*  --   --   --   --  7.5*  --  7.3*   * 128* 147*   < > 104*   < >  --   --    < > 124*   < > 107*   ALBUMIN  --   --   --   --   --   --   --   --   --  2.3*  --   --    PROTTOTAL  --   --   --   --   --   --   --   --   --  5.1*  --   --    BILITOTAL  --   --   --   --   --   --   --   --   --  2.1*  --   --    ALKPHOS  --   --   --   --   --   --   --   --   --  218*  --   --    ALT  --   --   --   --   --   --   --   --   --  102*  --   --    AST  --   --   --   --   --   --   --   --   --  189*  --   --     < > = values in this interval not displayed.

## 2022-08-04 NOTE — PLAN OF CARE
[id number]    Vital signs:  Temp: 98.8  F (37.1  C) Temp src: Oral BP: 114/76 Pulse: (!) 130   Resp: 20 SpO2: 100 % O2 Device: None (Room air) Oxygen Delivery: 2 LPM   Weight: 138 kg (304 lb 3.2 oz)    Activity: SBA  Pain: dilaudid and oxy ordered for pain management, pain primarily in rectum  Neuro: A&Ox4, makes needs known  Cardiac: Denies chest pain/tightness  Respiratory: Denies SOB, sating 100% on RA  GI/: Denies n/v, no abd discomfort, dougherty cath placed by urology after 4 attempts by floor nurse and resource nurse  Diet: Regular, good appetite  Lines: PIV, NS locked  Labs/imaging: Reviewed.       New changes this shift: Urinary retention after surgery, unable to void. See provider notification notes. 600cc out in dougherty post placement     Plan: Hbg critical value, providers notified and day shift made aware      Continue with POC

## 2022-08-04 NOTE — PROGRESS NOTES
Colorectal Surgery Progress Note     Subjective:   Doing ok.  Sore.  Had a tough night because was not able to urinate. Passing gas.  No stools.       Physical Exam:  /76 (BP Location: Right arm)   Pulse (!) 130   Temp 98.8  F (37.1  C) (Oral)   Resp 20   Wt 138 kg (304 lb 3.2 oz)   LMP  (LMP Unknown)   SpO2 100%   BMI 48.36 kg/m       General: NAD, walking in room  Pulmonary: No increased work of breathing on room air. Uses CPAP machine   Extremities: Moving extremities spontaneously. Warm and well-perfused. Bilateral lower leg pitting edema  :  Gonzales cath in place with dark concentrated appearing urine  External exam:  Minimal bloody drainage.  Some packing in place.     I/Os:  I/O last 3 completed shifts:  In: 940 [P.O.:320; I.V.:620]  Out: 150 [Urine:150]      Labs:  Recent Labs   Lab 08/03/22  0629 08/02/22  1422 08/02/22  0719 08/01/22  0556   WBC 10.3  --  9.8 9.8   HGB 7.7* 8.3* 7.2* 7.6*     --  237 262       Recent Labs   Lab 08/04/22  0755 08/04/22  0503 08/04/22  0254 08/03/22  0632 08/03/22  0629 08/02/22  1926 08/02/22  1422 08/02/22  0933 08/02/22  0719 08/01/22  0255 07/31/22  2321 07/31/22  0720 07/31/22  0634   NA  --  138  --   --  138  --   --   --  138   < >  --   --  138   POTASSIUM  --  3.8  --   --  3.5  --   --   --  3.7   < >  --   --  4.1   CHLORIDE  --  107  --   --  106  --   --   --  108*   < >  --   --  105   CO2  --  20*  --   --  21*  --   --   --  20*   < >  --   --  18*   BUN  --  3.3*  --   --  3.6*  --   --   --  6.3   < >  --   --  9.4   CR  --  0.95  --   --  0.87  --   --   --  1.09*   < >  --   --  1.44*   * 147* 183*   < > 104*   < >  --    < > 124*   < >  --    < > 97   QUINCY  --  7.2*  --   --  7.1*  --   --   --  7.5*   < >  --   --  7.6*   MAG  --   --   --   --   --   --  2.0  --  1.5*  --  1.9   < > 1.2*   PHOS  --   --   --   --   --   --   --   --   --   --   --   --  2.7    < > = values in this interval not displayed.         ASSESSMENT/PLAN:   Hilaria Bonner is a 31 year old female with PMH of HTN, T2DM, alcohol use, PE w/ subsequent PEA arrest, fatty liver disease, and gastric sleeve, admitted on 7/26/2022. Acute blood loss anemia requiring transfusion related to bleeding hemorrhoids in the setting of therapeutic anticoagulation. Pt without any notable cardiac or liver hx besides fatty liver disease, so the bilateral pitting edema is unusual. Echo on 7/26 unremarkable and with normal EF.  Now s/p Ligasure hemorrhoidectomy x3, proctoplasty, anoplasty on 8/3.    Recommendations:   - greatly appreciate medicine management and urology management  - Ok to restart apixaban from CRS perspective, but recommend monitoring for at least 24 hour while on apixaban.   - High fiber diet with daily Metamucil.  - Continue Sitz baths 3-4 times per day.  - Continue Calmoseptine on perianal skin 3-4 times per day.  - Tuck 4x4 gauze between buttocks to help catch and drainage/moisture. Secure in place with mesh underwear/depends.   - Continue to avoid constipation.  (Use both metamucil to provide bulk and miralax to keep stool moving.  Drink a lot of fluids with metamucil.)  - defer pain control to medicine team  - we will coordinate follow up with CRS  - verbally discussed with Medicine team.  Placed standard CRS anorectal post-op discharge orders into the discharge navigator after discussion with medicine team.       Seen with CRS fellow and discussed with staff    Criselda López PA-C  Colon and Rectal Surgery

## 2022-08-04 NOTE — PROGRESS NOTES
Brief Urology Note:    Notified by primary team that patient unable to urinate, multiple attempts at catheter placement were unsuccessful.    Patient was verbally consented for catheter placement and prepped and draped in standard sterile fashion. A 16 Fr coude catheter was inserted in standard sterile fashion into the bladder with return of clear yellow urine. Urethral meatus was on anterior wall just deep to introitus. A finger was placed in the vaginal introitus to guide the catheter anteriorly into the urethra. 10 mL were instilled in the balloon and the catheter secured in place.    Plan:  - Catheter cares per primary team   - When removing catheter for voiding trial, perform first thing in AM on weekday in case catheter needs replacement.    Andrea Frazier MD  Urology Resident, PGY-4

## 2022-08-04 NOTE — PROGRESS NOTES
colorectal Surgery Progress Note  Surgery Cross-Cover  Post Op Check    08/04/2022    Hilaria Bonner is a 31 year old female with h/o bleeding hemorrhoids now POD#1 (crossed midnight) s/p hemorrhoidectomy.    Pt reports her pain is controlled. Denies nausea SOB, chest pain, or dizziness. Reports passing flatus but no BMs. Patient does not have a dougherty catheter. She has the urge to void but she cannot. Nurses have tried to straight cath twice without success.    /76 (BP Location: Right arm)   Pulse (!) 130   Temp 98.8  F (37.1  C) (Oral)   Resp 20   Wt 138 kg (304 lb 3.2 oz)   LMP  (LMP Unknown)   SpO2 100%   BMI 48.36 kg/m      Gen: A&O x3, NAD  Chest: breathing non-labored  Abdomen: soft, non-tender, non-distended  Anus: Covered in gauze without bloody strikethrough  Extremities: warm and well perfused    A/P: Discussed urinary retention with nursing and primary team. Primary team plans to consult urology. Continue plan of care per primary team. Please call with any questions.    Manas Ag MD  General Surgery

## 2022-08-04 NOTE — PROVIDER NOTIFICATION
"Provider Notification:    Dr. Barnhart text paged at #[phone]:    \"Pt has not voided since surgery. Has attempted to urinate twice without success but states she feels like her bladder is full. Bladder scanned for 260cc. Please advise, thanks!\"  "

## 2022-08-04 NOTE — PLAN OF CARE
Goal Outcome Evaluation:    Time: 2054-8669  Activity: SBA to the bedside commode   Pain: c/o rectal pain rated 9/10. Gave 1 x PRN IV dilaudid po Oxy for pain management    Neuro: A&O x 4. MD ok for pt not been on Capno  Cardiac: WDL. Denies SOB, chest pain  Respiratory: On  RA @ 100% stat.   GI/: No BM. Pt has not voided after surgery, bladder scan shows 245 ml. Paged the team and a straight cath for urine ordered. One attemp try without  success. On coming nurse made aware.    Diet: Regular. BG checks HCHS     Lines: (R) PIV infusing tko. (L) PIV SL     Incisions/Drains/Skin: Rectum incision, dressing CDI.     Lab:  , 180.  Electrolyte Replacements: No replacement needed  PLan:Continue POc  New changes this shift: Anus Hemorrhoidectomy, External procedure was perform on pt today.    /68   Pulse 93   Temp 98.2  F (36.8  C) (Oral)   Resp 20   Wt 138 kg (304 lb 3.2 oz)   LMP  (LMP Unknown)   SpO2 100%   BMI 48.36 kg/m

## 2022-08-04 NOTE — PROVIDER NOTIFICATION
"Paged: Dr Barnhart [phone]    \"3 RNs have attempted to straight cath, Can you please place order for urology consult? Pt has not voided since surgery. Thank you.\"    Bladder scan again and repage.    \"Bladder scan volume 400ml. Patient still unable to void\"    Will contact urology.    Urology: Try an indwelling dougherty cath and urojet to numb.    No orders placed as of 0300    \"Pt still unable to void. Resource nurse suggested urojet. Urology suggested indwelling dougherty. Pt is becoming increasingly uncomfortable, how would you like me to proceed?\"    lidocain order placed, indwelling dougherty order placed.    \"Resource nurse attempted dougherty insertion, urethra was visualized, unable to advance and get urine return. How would you like me to proceed?\"    Urology came and placed dougherty at 0540    \"Pt's hbg 6.9. If wanting to transfuse RBC patient needs consent completed and transfusion orders. Thanks!\"    Will come down to get consent or will pass onto day team.    \"Pt's , taken apically. Currently asymptomatic\"  "

## 2022-08-05 ENCOUNTER — APPOINTMENT (OUTPATIENT)
Dept: OCCUPATIONAL THERAPY | Facility: CLINIC | Age: 32
End: 2022-08-05
Payer: COMMERCIAL

## 2022-08-05 LAB
ANION GAP SERPL CALCULATED.3IONS-SCNC: 11 MMOL/L (ref 7–15)
BLD PROD TYP BPU: NORMAL
BLOOD COMPONENT TYPE: NORMAL
BUN SERPL-MCNC: 3.9 MG/DL (ref 6–20)
CALCIUM SERPL-MCNC: 7.7 MG/DL (ref 8.6–10)
CHLORIDE SERPL-SCNC: 106 MMOL/L (ref 98–107)
CODING SYSTEM: NORMAL
CREAT SERPL-MCNC: 0.92 MG/DL (ref 0.51–0.95)
CROSSMATCH: NORMAL
DEPRECATED HCO3 PLAS-SCNC: 20 MMOL/L (ref 22–29)
ERYTHROCYTE [DISTWIDTH] IN BLOOD BY AUTOMATED COUNT: 18.9 % (ref 10–15)
GFR SERPL CREATININE-BSD FRML MDRD: 85 ML/MIN/1.73M2
GLUCOSE BLDC GLUCOMTR-MCNC: 114 MG/DL (ref 70–99)
GLUCOSE BLDC GLUCOMTR-MCNC: 127 MG/DL (ref 70–99)
GLUCOSE BLDC GLUCOMTR-MCNC: 147 MG/DL (ref 70–99)
GLUCOSE BLDC GLUCOMTR-MCNC: 158 MG/DL (ref 70–99)
GLUCOSE BLDC GLUCOMTR-MCNC: 240 MG/DL (ref 70–99)
GLUCOSE SERPL-MCNC: 138 MG/DL (ref 70–99)
HCT VFR BLD AUTO: 23.1 % (ref 35–47)
HGB BLD-MCNC: 6.8 G/DL (ref 11.7–15.7)
HGB BLD-MCNC: 6.9 G/DL (ref 11.7–15.7)
HGB BLD-MCNC: 7.9 G/DL (ref 11.7–15.7)
ISSUE DATE AND TIME: NORMAL
MCH RBC QN AUTO: 33 PG (ref 26.5–33)
MCHC RBC AUTO-ENTMCNC: 29.4 G/DL (ref 31.5–36.5)
MCV RBC AUTO: 112 FL (ref 78–100)
PATH REPORT.COMMENTS IMP SPEC: NORMAL
PATH REPORT.COMMENTS IMP SPEC: NORMAL
PATH REPORT.FINAL DX SPEC: NORMAL
PATH REPORT.GROSS SPEC: NORMAL
PATH REPORT.MICROSCOPIC SPEC OTHER STN: NORMAL
PATH REPORT.RELEVANT HX SPEC: NORMAL
PHOTO IMAGE: NORMAL
PLATELET # BLD AUTO: 227 10E3/UL (ref 150–450)
POTASSIUM SERPL-SCNC: 3.7 MMOL/L (ref 3.4–5.3)
RBC # BLD AUTO: 2.06 10E6/UL (ref 3.8–5.2)
SODIUM SERPL-SCNC: 137 MMOL/L (ref 136–145)
UNIT ABO/RH: NORMAL
UNIT NUMBER: NORMAL
UNIT STATUS: NORMAL
UNIT TYPE ISBT: 6200
WBC # BLD AUTO: 7.6 10E3/UL (ref 4–11)

## 2022-08-05 PROCEDURE — 250N000013 HC RX MED GY IP 250 OP 250 PS 637: Performed by: COLON & RECTAL SURGERY

## 2022-08-05 PROCEDURE — P9016 RBC LEUKOCYTES REDUCED: HCPCS | Performed by: STUDENT IN AN ORGANIZED HEALTH CARE EDUCATION/TRAINING PROGRAM

## 2022-08-05 PROCEDURE — 85018 HEMOGLOBIN: CPT

## 2022-08-05 PROCEDURE — 84252 ASSAY OF VITAMIN B-2: CPT

## 2022-08-05 PROCEDURE — 99233 SBSQ HOSP IP/OBS HIGH 50: CPT | Mod: GC | Performed by: PEDIATRICS

## 2022-08-05 PROCEDURE — 250N000013 HC RX MED GY IP 250 OP 250 PS 637

## 2022-08-05 PROCEDURE — 36415 COLL VENOUS BLD VENIPUNCTURE: CPT

## 2022-08-05 PROCEDURE — 999N000157 HC STATISTIC RCP TIME EA 10 MIN

## 2022-08-05 PROCEDURE — 120N000002 HC R&B MED SURG/OB UMMC

## 2022-08-05 PROCEDURE — 250N000011 HC RX IP 250 OP 636

## 2022-08-05 PROCEDURE — 85027 COMPLETE CBC AUTOMATED: CPT

## 2022-08-05 PROCEDURE — 97535 SELF CARE MNGMENT TRAINING: CPT | Mod: GO | Performed by: OCCUPATIONAL THERAPIST

## 2022-08-05 PROCEDURE — C9113 INJ PANTOPRAZOLE SODIUM, VIA: HCPCS

## 2022-08-05 PROCEDURE — 85018 HEMOGLOBIN: CPT | Performed by: STUDENT IN AN ORGANIZED HEALTH CARE EDUCATION/TRAINING PROGRAM

## 2022-08-05 PROCEDURE — 82310 ASSAY OF CALCIUM: CPT

## 2022-08-05 RX ADMIN — METRONIDAZOLE 500 MG: 500 TABLET ORAL at 08:24

## 2022-08-05 RX ADMIN — POLYETHYLENE GLYCOL 3350 17 G: 17 POWDER, FOR SOLUTION ORAL at 08:25

## 2022-08-05 RX ADMIN — OXYCODONE HYDROCHLORIDE 10 MG: 5 TABLET ORAL at 16:16

## 2022-08-05 RX ADMIN — PANTOPRAZOLE SODIUM 40 MG: 40 INJECTION, POWDER, FOR SOLUTION INTRAVENOUS at 19:39

## 2022-08-05 RX ADMIN — Medication 1 TABLET: at 08:24

## 2022-08-05 RX ADMIN — WITCH HAZEL: 500 SOLUTION RECTAL; TOPICAL at 14:24

## 2022-08-05 RX ADMIN — APIXABAN 5 MG: 5 TABLET, FILM COATED ORAL at 19:39

## 2022-08-05 RX ADMIN — Medication 1 SPRAY: at 14:23

## 2022-08-05 RX ADMIN — PREGABALIN 200 MG: 100 CAPSULE ORAL at 14:23

## 2022-08-05 RX ADMIN — WITCH HAZEL: 500 SOLUTION RECTAL; TOPICAL at 19:51

## 2022-08-05 RX ADMIN — ANORECTAL OINTMENT: 15.7; .44; 24; 20.6 OINTMENT TOPICAL at 08:44

## 2022-08-05 RX ADMIN — OXYCODONE HYDROCHLORIDE 10 MG: 5 TABLET ORAL at 00:02

## 2022-08-05 RX ADMIN — PSYLLIUM HUSK 1 PACKET: 3.4 POWDER ORAL at 08:24

## 2022-08-05 RX ADMIN — PREGABALIN 200 MG: 100 CAPSULE ORAL at 19:39

## 2022-08-05 RX ADMIN — FOLIC ACID 1 MG: 1 TABLET ORAL at 08:23

## 2022-08-05 RX ADMIN — OXYCODONE HYDROCHLORIDE 10 MG: 5 TABLET ORAL at 19:38

## 2022-08-05 RX ADMIN — OXYCODONE HYDROCHLORIDE 10 MG: 5 TABLET ORAL at 11:55

## 2022-08-05 RX ADMIN — PREGABALIN 200 MG: 100 CAPSULE ORAL at 08:43

## 2022-08-05 RX ADMIN — OXYCODONE HYDROCHLORIDE 10 MG: 5 TABLET ORAL at 04:35

## 2022-08-05 RX ADMIN — OXYCODONE HYDROCHLORIDE 10 MG: 5 TABLET ORAL at 08:22

## 2022-08-05 RX ADMIN — WITCH HAZEL: 500 SOLUTION RECTAL; TOPICAL at 16:17

## 2022-08-05 RX ADMIN — Medication 1 SPRAY: at 00:07

## 2022-08-05 RX ADMIN — METRONIDAZOLE 500 MG: 500 TABLET ORAL at 19:39

## 2022-08-05 RX ADMIN — APIXABAN 5 MG: 5 TABLET, FILM COATED ORAL at 08:24

## 2022-08-05 RX ADMIN — ANORECTAL OINTMENT: 15.7; .44; 24; 20.6 OINTMENT TOPICAL at 19:51

## 2022-08-05 RX ADMIN — PANTOPRAZOLE SODIUM 40 MG: 40 INJECTION, POWDER, FOR SOLUTION INTRAVENOUS at 08:24

## 2022-08-05 RX ADMIN — ANORECTAL OINTMENT: 15.7; .44; 24; 20.6 OINTMENT TOPICAL at 14:24

## 2022-08-05 RX ADMIN — VENLAFAXINE HYDROCHLORIDE 225 MG: 150 CAPSULE, EXTENDED RELEASE ORAL at 08:24

## 2022-08-05 RX ADMIN — WITCH HAZEL: 500 SOLUTION RECTAL; TOPICAL at 08:44

## 2022-08-05 RX ADMIN — OXYCODONE HYDROCHLORIDE 10 MG: 5 TABLET ORAL at 22:46

## 2022-08-05 ASSESSMENT — ACTIVITIES OF DAILY LIVING (ADL)
ADLS_ACUITY_SCORE: 42
ADLS_ACUITY_SCORE: 48
ADLS_ACUITY_SCORE: 42
ADLS_ACUITY_SCORE: 48
ADLS_ACUITY_SCORE: 42
ADLS_ACUITY_SCORE: 48
ADLS_ACUITY_SCORE: 42
ADLS_ACUITY_SCORE: 48

## 2022-08-05 NOTE — PROGRESS NOTES
Although Pt is not ready for discharge, SW provided a list of [facility] TCU providers that fall under her insurance. When Pt is med ready for discharge then referrals of her choosing can be sent if TCU's are an appropriate discharge plan.     RUBÉN will continue to f/u with Pt.    Yola Gamez, BSW, LSW  ICU   PH#: [phone]  Pager#: [phone]  [facility]    For weekend social work needs:  4A, 4C, 4E, 5A, 5B    pager [phone]  6A, 6B, 6C                 pager [phone]  7A, 7B, 7C, 7D, 5C    pager [phone]  ED/OBS                     pager [phone]

## 2022-08-05 NOTE — PLAN OF CARE
Goal Outcome Evaluation:  Time: 6279-3351  Activity: SBA to the bedside commode   Pain: c/o rectal pain and abdominal. Gave scheduled tylenol, PRN Oxy and Dilaudid for pain relieved.    Neuro: A&O x 4. Pt able to make needs known  Cardiac: WDL. Denies SOB, chest pain  Respiratory: On  RA @ 100% stat.   GI/: Pt passing flatus, no BM yet. Voiding via dougherty with adequate UOP  Diet: Regular with good appetite. Order pizza from outside for dinner. BG checks HS   Lines: (R) PIV SL.   Incisions/Drains/Skin: Rectum incision. See MAR for wound care order     Lab:  , 124.  Electrolyte Replacements: No replacement needed  Plan:Continue POC

## 2022-08-05 NOTE — PROGRESS NOTES
Colorectal Surgery Progress Note  August 5, 2022     Hilaria Bonner is a 31 year old female admitted on 7/26/2022, PMH of HTN, T2DM, alcohol use, and prior PE c/b subsequent PEA arrest, and fatty liver disease who is admitted for hypotensive shock and anemia likely secondary to hemorrhoidal bleeding. POD 2 s/p hemorrhoidectomy.     Subjective:   Patient reports passing flatus and stool.  Pt denies blood in stool.   Tolerating a regular diet without vomiting. Intermittent nausea managed by Zofran.  Pain is controlled with tylenol, oxycodone, dilaudid (0.2-0.4mg), witch hazel.  Ambulating and urinating without dougherty.      Physical Exam:  /53   Pulse 95   Temp 97.3  F (36.3  C) (Oral)   Resp 18   Wt 309 lb 14.4 oz   LMP  (LMP Unknown)   SpO2 100%   BMI 49.27 kg/m       General: Grossly alert and oriented, no acute distress.  Sleeping comfortably  Pulmonary: No increased work of breathing on room air  Perineal exam:  Deferred in setting of recent hemorrhoidectomy    I/Os:  I/O last 3 completed shifts:  In: -   Out: 2000 [Urine:2000]      Labs:  Recent Labs   Lab 08/04/22  1409 08/03/22  0629 08/02/22  1422 08/02/22  0719 08/01/22  0556   WBC  --  10.3  --  9.8 9.8   HGB 7.5* 7.7* 8.3* 7.2* 7.6*   PLT  --  239  --  237 262       Recent Labs   Lab 08/05/22  0237 08/04/22  2210 08/04/22  1706 08/04/22  0755 08/04/22  0503 08/03/22  0632 08/03/22  0629 08/02/22  1926 08/02/22  1422 08/02/22  0933 08/02/22  0719 08/01/22  0255 07/31/22  2321 07/31/22  0720 07/31/22  0634   NA  --   --   --   --  138  --  138  --   --   --  138   < >  --   --  138   POTASSIUM  --   --   --   --  3.8  --  3.5  --   --   --  3.7   < >  --   --  4.1   CHLORIDE  --   --   --   --  107  --  106  --   --   --  108*   < >  --   --  105   CO2  --   --   --   --  20*  --  21*  --   --   --  20*   < >  --   --  18*   BUN  --   --   --   --  3.3*  --  3.6*  --   --   --  6.3   < >  --   --  9.4   CR  --   --   --   --  0.95  --  0.87  --    --   --  1.09*   < >  --   --  1.44*   * 124* 128*   < > 147*   < > 104*   < >  --    < > 124*   < >  --    < > 97   QUINCY  --   --   --   --  7.2*  --  7.1*  --   --   --  7.5*   < >  --   --  7.6*   MAG  --   --   --   --   --   --   --   --  2.0  --  1.5*  --  1.9   < > 1.2*   PHOS  --   --   --   --   --   --   --   --   --   --   --   --   --   --  2.7    < > = values in this interval not displayed.       Labs reviewed, notable for:   Pending: surg path    Imaging:  None     ASSESSMENT/PLAN:   Hilaria Bonner is a 31 year old female with PMH of HTN, T2DM, alcohol use, and prior PE c/b subsequent PEA arrest, and fatty liver disease who is admitted for hypotensive shock and anemia likely secondary to hemorrhoidal bleeding. POD 2 s/p hemorrhoidectomy x3.    Recommend:  - Continue DVT prophylaxis apixaban 5mg BID as recommended by heme   - High fiber diet with daily Metamucil.  - Continue Sitz baths 3-4 times per day.  - Continue Calmoseptine on perianal skin 3-4 times per day.  - Tuck 4x4 gauze between buttocks to help catch and drainage/moisture. Secure in place with mesh underwear/depends.   - Continue to avoid constipation.  (Use both metamucil to provide bulk and miralax to keep stool moving.  Drink a lot of fluids with metamucil.)  - defer pain control to medicine team  - placed standard CRS post-op anorectal discharge instructions.  We will coordinate clinic follow up in a few weeks.   - discussed with medicine team    At this time, CR surgery will sign off and please follow up with any concerns.     Seen with CRS fellow and discussed with staff     Aubree Parmar, MS3        Addendum:    - agree with note above.  Modifications made.   - greatly appreciate medicine management  - Discussed with medicine, we will sign off at this time.  Please page/call with questions/concerns.   - Per medicine, possibly awaiting TCU placement as pt is mobilizing below baseline  Criselda López PA-C  Colon and  Rectal Surgery     Discussed with fellow, Dr. Morales.     The above plan of care was performed and communicated to me by Dr. Way

## 2022-08-05 NOTE — PLAN OF CARE
Goal Outcome Evaluation:  A&Ox4. AVSS. Rectal pain relief with oxy 10 mg tab po Q 3 hrs PRN. Hgb 6.8, one unit of blood is being transfused with no reaction so far. Up to bed side commode independently, adequate UOP. Bladder scan right after pt void,235 ml.Watery stool x1. Good appetite. Bs covered per sliding scale.  Pt asked to go outside to smoke, plan to go outside when blood transfusion is completed and mother arrives.  Continue with POC

## 2022-08-05 NOTE — PLAN OF CARE
"Goal Outcome Evaluation:    9892-4604    OVSS. Gonzales catheter removed at midnight per orders. Patient voided, did not save urine, stated she voided  \"a lot\". BM x1. Oxycodone given x2 for rectal pain w/ some relief. Zofran given x1 for intermittent nausea. BG at 0200 was 240. Up SBA. Continue w/ POC.                       "

## 2022-08-05 NOTE — PROGRESS NOTES
[facility]    Progress Note - Medicine Service, [facility] TEAM 1       Date of Admission:  7/26/2022    Assessment & Plan        Hilaria Bonner is a 31 year old female admitted on 7/26/2022. She has a history of HTN, T2DM, alcohol use, and prior PE c/b subsequent PEA arrest on Xarelto, fatty liver disease and anxiety who is admitted for hypotensive shock and anemia likely secondary to hemorrhoidal bleeding.     Changes today:  -Gonzales removed 2200 last night w/ adequate spontaneous voiding   -Hgb 6.8 this AM--> 6.9 on recheck. Received 1 unit of pRBCs. Recheck Hgb pending  -continuing apixaban   -awaiting TCU placement     #Rectal bleeding s/p mixed hemorrhoidectomy   #Hypovolemic shock 2/2 blood loss vs. Dehydration, improving  P/w 3 weeks of hematochezia with associated lightheadedness and fatigue while on Xarelto. Hypotensive with improvement after repeat LR boluses. Baseline hgb 9-10. On arrival, Hgb 6.4, improved to 7.8 after 2 U pRBC. CTA abd/pelvis negative for active extravasation. Infectious stool panel negative. EGD/Colonoscopy performed 7/28 without evidence of acute bleed but presence of hemorrhoids. Hgb continued to wax and wane between 7-9 w/ ongoing bloody stools. Hemorrhoidectomy performed 8/3 with improvement in rectal bleeding.   -Colorectal surgery following:   -Hemorrhoidectomy performed 8/3--went well   -Miralax added for soft stools. Continue metamucil.    -Continue sitz baths 3-4x daily and tuck 4x4 gauze    -Restarted apixaban 8/4  -BP stable  -AM CBC checks    #Chronic macrocytic anemia, acutely worse   #Reticulocytosis   Baseline Hgb ~9-10 since 2019 (appears to be normal years prior) w/ MCV ~100. Likely 2/2 alcohol use disorder vs drug induced macrocytosis (is on chronic folate/B12 supplementation and both folate/B12 levels have been normal). Hgb 6.4 on arrival w/ elevated reticulocyte count consistent with anemia from hemorrhoidal bleeding. Hgb  dropped to 6.8-->6.9 this AM (< 48 hours s/p hemorrhoidectomy). Pt hemodynamically stable but fatigued.  -Received 1 unit pRBCs   -Repeat Hgb pending     #. Acute urinary retention, resolved  400 ml of urine retention post-hemorrhoidectomy, likely 2/2 inflammation/swelling from procedure vs prazosin being held 2/2 hypotension. Indwelling dougherty placed by urology-> now removed with adequate spontaneous voiding throughout the day   -Prazosin restarted last night as her BP is now normalized  -Continue to monitor urine output w/ bladder scans PRN (per protocol)     #. Acute on chronic sinus tachycardia--stable  Resting -115 with intermittent acceleration with ambulation. Says that this has been normal for her since her PE. Pt denies SOB, chest pain, or palpitations.  Sinus rhythm on cardiac monitoring x2 days--now stopped. No evidence for infection on exam and white count normal. Continues to be satting well on room air. Tachycardia improved to low 100's after IV hydration. Suspect tachycardia 2/2 dehydration in the setting of ARAMIS, hypotension, and lactic acidosis vs an autonomic dysfunction.   -Will continue to monitor vitals     #. Mouth pain   C/o tongue pain over the past few days. No relief with magic mouthwash. Also has cracking/pain on corners of mouth. Could be due to dry air vs vitamin deficiency vs medication induced  -B2 level pending  -Biotene spray 4x daily PRN  -Vaseline for lips/corners of mouth     #. Extremity Pains  #. BLE edema, non-pitting  #. Hypomagnesia   Describes vague aches and pains in her arms and legs. Different from her neuropathy. CK normal. BLE US negative 8/1.   -Continue magnesium replacement protocol     #ARAMIS - resolved  Pt presented with creatinine elevated to 2.18 on admission with baseline at 0.7-0.8. Likely due to prerenal hypotension in setting of rectal bleed vs. dehydration. Improved with pRBC and fluid resuscitation.   -Continue to monitor with AM BMP    #Lactic acidosis -  improving   Bicarb improved with volume resuscitation. Suspect contribution of elevated lactate (6/7 on admit, now down to 2.2), hypotension (resolving) and anemia (transfused).   - trend BMP    #. Trichomonas Vaginalis   Called by PCP 8/3 to report that pt's recent PAP smear came back positive for trichomonas. Pt denies any sxs.   -Continue Flagyl 500 mg BID x 7 day course    Chronic Medical Problems  #Hx of PE c/b PEA arrest in 2018  -PTA Xarelto held on arrival 2/2 concern for GI bleed. Started on apixaban 7/28 after bleed not found on EGD/C. May hold apixaban PM if Hgb continues to decline at recheck 7/30   -ECHO showed normal global and regional left ventricular function with EF of 55-60%. No changes compared to previous study.  -Will continue to monitor     #Chronic peripheral neuropathy  -PTA Pregabalin     #Type 2 diabetes  A1c on admission 5.4. Glucose in low 100's since admission   -Hold PTA metformin  -monitor on BMP daily, will increase frequency if needed    #Chronic anxiety  #Nightmares  -Continue PTA Venlafaxine  -restarted Zyprexa   -restarted prazosin    #Obstructive Sleep Apnea  -CPAP at night    #Chronic hypertension  -Hold PTA Lisinopril and hydrochlorothiazide while normotensive and intermittently hypotensive.     #R foot injury   Tripped and fell from standing height on 6/29, inverting her R ankle. Seen at [facility] in [place] 6/30. R ankle XRs negative; R foot XRs with ? Calcaneal fracture at the calcaneal cuboid joint. Given CAM boot, which she has been wearing since. Continued to have R foot pain on admission. Repeat R foot XR without calcaneal fracture but there are ossific fragment projections over the proximal second metatarsal. No further workup or intervention recommended, per Ortho.   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 Appears pt has been following with  neurology. Sx onset April 2020 4 weeks post-covid. NCS in 7/20 showed absent sensory responses in the upper and lower limbs and normal motor responses. Has been on maintenance IVIG q3 weeks (recently decreased to [id number]) since then and sx managed well with this per last neuro note 5/9/22.  -IVIG infusion ordered    #Hx of alcohol abuse  #Hx of alcohol induced pancreatitis   Used to drink 1L vodka daily. Hospitalized Feb. 2021 for alcohol induced pancreatitis and has been sober since. Hepatosteatosis noted on CTAP 7/26/22.       Diet: Regular Diet Adult    DVT Prophylaxis: apixaban 5 mg BID  Dougherty Catheter: Not present  Fluids: LR fluid bolus  Central Lines: None  Cardiac Monitoring: None  Code Status: Full Code      The patient's care was discussed with the Attending Physician, Dr. Caceres and Patient.    Jordyn Rivas, DO  Medicine Service, [facility] TEAM [address] [place] [address]  Securely message with the Vocera Web Console (learn more here)  Text page via [facility] Paging/Directory   Please see signed in provider for up to date coverage information      Clinically Significant Risk Factors Present on Admission                     ______________________________________________________________________    Interval History   Urinated well after dougherty removed  Hgb 6.8 this AM  Had BM overnight--no blood in stool or toilet but did have some on toilet paper when she wiped    Feels fatigued today. Wonders if this is because she was restarted on her prazosin last night, which she takes for nightmares. Slept well last night and only got up twice to urinate. Denies any lightheadedness, chest pain, palpitations, or shortness of breath.     4 pt ROS otherwise negative.     Data reviewed today: I reviewed all medications, new labs and imaging results over the last 24 hours.    Physical Exam   Vital Signs: Temp: 97.6  F (36.4  C) Temp src: Oral BP: 107/71 Pulse: 110   Resp: 18 SpO2: 98 % O2 Device: None  (Room air)    Weight: 309 lbs 0 oz  General Appearance: No acute distress   Respiratory: clear lung sounds, no increased work of breathing  Cardiovascular: regular rate and rhythm, no murmur   GI: Non distended. Soft, non tender  Extremities: 2+ pitting edema in extremities   Other: Alert and awake     Data   Recent Labs   Lab 08/05/22  1258 08/05/22  1146 08/05/22  0847 08/05/22  0822 08/04/22  1706 08/04/22  1409 08/04/22  0755 08/04/22  0503 08/03/22  0632 08/03/22  0629 08/02/22  1926 08/02/22  1543 08/02/22  0933 08/02/22  0719   WBC  --   --   --  7.6  --   --   --   --   --  10.3  --   --   --  9.8   HGB  --  6.9*  --  6.8*  --  7.5*  --   --   --  7.7*  --   --    < > 7.2*   MCV  --   --   --  112*  --   --   --   --   --  108*  --   --   --  107*   PLT  --   --   --  227  --   --   --   --   --  239  --   --   --  237   INR  --   --   --   --   --   --   --   --   --  1.15  --  1.13  --   --    NA  --   --   --  137  --   --   --  138  --  138  --   --   --  138   POTASSIUM  --   --   --  3.7  --   --   --  3.8  --  3.5  --   --   --  3.7   CHLORIDE  --   --   --  106  --   --   --  107  --  106  --   --   --  108*   CO2  --   --   --  20*  --   --   --  20*  --  21*  --   --   --  20*   BUN  --   --   --  3.9*  --   --   --  3.3*  --  3.6*  --   --   --  6.3   CR  --   --   --  0.92  --   --   --  0.95  --  0.87  --   --   --  1.09*   ANIONGAP  --   --   --  11  --   --   --  11  --  11  --   --   --  10   QUINCY  --   --   --  7.7*  --   --   --  7.2*  --  7.1*  --   --   --  7.5*   *  --  147* 138*   < >  --    < > 147*   < > 104*   < >  --    < > 124*   ALBUMIN  --   --   --   --   --   --   --   --   --   --   --   --   --  2.3*   PROTTOTAL  --   --   --   --   --   --   --   --   --   --   --   --   --  5.1*   BILITOTAL  --   --   --   --   --   --   --   --   --   --   --   --   --  2.1*   ALKPHOS  --   --   --   --   --   --   --   --   --   --   --   --   --  218*   ALT  --   --   --   --   --    --   --   --   --   --   --   --   --  102*   AST  --   --   --   --   --   --   --   --   --   --   --   --   --  189*    < > = values in this interval not displayed.

## 2022-08-06 ENCOUNTER — APPOINTMENT (OUTPATIENT)
Dept: OCCUPATIONAL THERAPY | Facility: CLINIC | Age: 32
End: 2022-08-06
Payer: COMMERCIAL

## 2022-08-06 DIAGNOSIS — I26.99 PULMONARY EMBOLISM WITH INFARCTION (H): ICD-10-CM

## 2022-08-06 DIAGNOSIS — I10 BENIGN ESSENTIAL HYPERTENSION: ICD-10-CM

## 2022-08-06 DIAGNOSIS — F29 PSYCHOSIS, UNSPECIFIED PSYCHOSIS TYPE (H): ICD-10-CM

## 2022-08-06 LAB
ERYTHROCYTE [DISTWIDTH] IN BLOOD BY AUTOMATED COUNT: 19.8 % (ref 10–15)
GLUCOSE BLDC GLUCOMTR-MCNC: 111 MG/DL (ref 70–99)
GLUCOSE BLDC GLUCOMTR-MCNC: 124 MG/DL (ref 70–99)
GLUCOSE BLDC GLUCOMTR-MCNC: 130 MG/DL (ref 70–99)
GLUCOSE BLDC GLUCOMTR-MCNC: 99 MG/DL (ref 70–99)
HCT VFR BLD AUTO: 23.7 % (ref 35–47)
HGB BLD-MCNC: 10.1 G/DL (ref 11.7–15.7)
HGB BLD-MCNC: 7.2 G/DL (ref 11.7–15.7)
HOLD SPECIMEN: NORMAL
MCH RBC QN AUTO: 33.3 PG (ref 26.5–33)
MCHC RBC AUTO-ENTMCNC: 30.4 G/DL (ref 31.5–36.5)
MCV RBC AUTO: 110 FL (ref 78–100)
PLATELET # BLD AUTO: 167 10E3/UL (ref 150–450)
RBC # BLD AUTO: 2.16 10E6/UL (ref 3.8–5.2)
WBC # BLD AUTO: 8.1 10E3/UL (ref 4–11)

## 2022-08-06 PROCEDURE — 250N000013 HC RX MED GY IP 250 OP 250 PS 637

## 2022-08-06 PROCEDURE — 120N000002 HC R&B MED SURG/OB UMMC

## 2022-08-06 PROCEDURE — 36415 COLL VENOUS BLD VENIPUNCTURE: CPT | Performed by: STUDENT IN AN ORGANIZED HEALTH CARE EDUCATION/TRAINING PROGRAM

## 2022-08-06 PROCEDURE — 85027 COMPLETE CBC AUTOMATED: CPT | Performed by: STUDENT IN AN ORGANIZED HEALTH CARE EDUCATION/TRAINING PROGRAM

## 2022-08-06 PROCEDURE — 250N000013 HC RX MED GY IP 250 OP 250 PS 637: Performed by: COLON & RECTAL SURGERY

## 2022-08-06 PROCEDURE — 97530 THERAPEUTIC ACTIVITIES: CPT | Mod: GO

## 2022-08-06 PROCEDURE — 36415 COLL VENOUS BLD VENIPUNCTURE: CPT

## 2022-08-06 PROCEDURE — C9113 INJ PANTOPRAZOLE SODIUM, VIA: HCPCS

## 2022-08-06 PROCEDURE — 85018 HEMOGLOBIN: CPT

## 2022-08-06 PROCEDURE — 250N000011 HC RX IP 250 OP 636

## 2022-08-06 PROCEDURE — 99233 SBSQ HOSP IP/OBS HIGH 50: CPT | Mod: GC | Performed by: PEDIATRICS

## 2022-08-06 PROCEDURE — 97535 SELF CARE MNGMENT TRAINING: CPT | Mod: GO

## 2022-08-06 PROCEDURE — 250N000013 HC RX MED GY IP 250 OP 250 PS 637: Performed by: STUDENT IN AN ORGANIZED HEALTH CARE EDUCATION/TRAINING PROGRAM

## 2022-08-06 PROCEDURE — 250N000011 HC RX IP 250 OP 636: Performed by: STUDENT IN AN ORGANIZED HEALTH CARE EDUCATION/TRAINING PROGRAM

## 2022-08-06 RX ORDER — FLUORIDE TOOTHPASTE
15 TOOTHPASTE DENTAL 4 TIMES DAILY PRN
Status: DISCONTINUED | OUTPATIENT
Start: 2022-08-06 | End: 2022-08-11 | Stop reason: HOSPADM

## 2022-08-06 RX ORDER — POTASSIUM CHLORIDE 750 MG/1
TABLET, EXTENDED RELEASE ORAL
Qty: 120 TABLET | Refills: 0 | Status: SHIPPED | OUTPATIENT
Start: 2022-08-06 | End: 2023-02-28

## 2022-08-06 RX ORDER — ACETAMINOPHEN 325 MG/1
650 TABLET ORAL EVERY 4 HOURS PRN
Status: DISCONTINUED | OUTPATIENT
Start: 2022-08-06 | End: 2022-08-11 | Stop reason: HOSPADM

## 2022-08-06 RX ORDER — RIVAROXABAN 20 MG/1
TABLET, FILM COATED ORAL
Qty: 90 TABLET | Refills: 0 | OUTPATIENT
Start: 2022-08-06

## 2022-08-06 RX ORDER — FUROSEMIDE 10 MG/ML
10 INJECTION INTRAMUSCULAR; INTRAVENOUS ONCE
Status: COMPLETED | OUTPATIENT
Start: 2022-08-06 | End: 2022-08-06

## 2022-08-06 RX ADMIN — OXYCODONE HYDROCHLORIDE 10 MG: 5 TABLET ORAL at 11:31

## 2022-08-06 RX ADMIN — PREGABALIN 200 MG: 100 CAPSULE ORAL at 08:03

## 2022-08-06 RX ADMIN — METRONIDAZOLE 500 MG: 500 TABLET ORAL at 20:33

## 2022-08-06 RX ADMIN — OXYCODONE HYDROCHLORIDE 10 MG: 5 TABLET ORAL at 22:48

## 2022-08-06 RX ADMIN — OXYCODONE HYDROCHLORIDE 10 MG: 5 TABLET ORAL at 18:47

## 2022-08-06 RX ADMIN — PREGABALIN 200 MG: 100 CAPSULE ORAL at 20:34

## 2022-08-06 RX ADMIN — NICOTINE 1 CARTRIDGE: 4 INHALANT RESPIRATORY (INHALATION) at 18:19

## 2022-08-06 RX ADMIN — PRAZOSIN HYDROCHLORIDE 1 MG: 1 CAPSULE ORAL at 01:12

## 2022-08-06 RX ADMIN — APIXABAN 5 MG: 5 TABLET, FILM COATED ORAL at 20:34

## 2022-08-06 RX ADMIN — METRONIDAZOLE 500 MG: 500 TABLET ORAL at 08:02

## 2022-08-06 RX ADMIN — Medication 1 TABLET: at 08:03

## 2022-08-06 RX ADMIN — FOLIC ACID 1 MG: 1 TABLET ORAL at 08:03

## 2022-08-06 RX ADMIN — POLYETHYLENE GLYCOL 3350 17 G: 17 POWDER, FOR SOLUTION ORAL at 08:02

## 2022-08-06 RX ADMIN — OLANZAPINE 10 MG: 10 TABLET, FILM COATED ORAL at 01:12

## 2022-08-06 RX ADMIN — PANTOPRAZOLE SODIUM 40 MG: 40 INJECTION, POWDER, FOR SOLUTION INTRAVENOUS at 20:33

## 2022-08-06 RX ADMIN — APIXABAN 5 MG: 5 TABLET, FILM COATED ORAL at 08:03

## 2022-08-06 RX ADMIN — Medication 15 ML: at 18:19

## 2022-08-06 RX ADMIN — WITCH HAZEL: 500 SOLUTION RECTAL; TOPICAL at 15:43

## 2022-08-06 RX ADMIN — PREGABALIN 200 MG: 100 CAPSULE ORAL at 15:44

## 2022-08-06 RX ADMIN — PSYLLIUM HUSK 1 PACKET: 3.4 POWDER ORAL at 08:02

## 2022-08-06 RX ADMIN — VENLAFAXINE HYDROCHLORIDE 225 MG: 150 CAPSULE, EXTENDED RELEASE ORAL at 08:03

## 2022-08-06 RX ADMIN — OXYCODONE HYDROCHLORIDE 10 MG: 5 TABLET ORAL at 15:44

## 2022-08-06 RX ADMIN — Medication 1 SPRAY: at 08:09

## 2022-08-06 RX ADMIN — FUROSEMIDE 10 MG: 10 INJECTION, SOLUTION INTRAMUSCULAR; INTRAVENOUS at 09:51

## 2022-08-06 RX ADMIN — PANTOPRAZOLE SODIUM 40 MG: 40 INJECTION, POWDER, FOR SOLUTION INTRAVENOUS at 08:02

## 2022-08-06 RX ADMIN — OXYCODONE HYDROCHLORIDE 10 MG: 5 TABLET ORAL at 07:26

## 2022-08-06 RX ADMIN — OLANZAPINE 10 MG: 10 TABLET, FILM COATED ORAL at 22:48

## 2022-08-06 RX ADMIN — PRAZOSIN HYDROCHLORIDE 1 MG: 1 CAPSULE ORAL at 22:48

## 2022-08-06 RX ADMIN — ANORECTAL OINTMENT: 15.7; .44; 24; 20.6 OINTMENT TOPICAL at 15:43

## 2022-08-06 ASSESSMENT — ACTIVITIES OF DAILY LIVING (ADL)
ADLS_ACUITY_SCORE: 42

## 2022-08-06 NOTE — PLAN OF CARE
Goal Outcome Evaluation:      Neuro: A&O x4  Diet:Regular   GI:: Voiding mixed with loose stool.  Resp: R.A   Mobility: SBA  Cardiac: WNL  Skin: Dry skin   Lines/Drains: PIV SL  Pain: Endoring pain 6/10 on rectal    Behavior: calm   VS: Blood pressure 110/67, pulse 88, temperature 98  F (36.7  C), temperature source Oral, resp. rate 20, weight 141.2 kg (311 lb 4.6 oz), SpO2 99 %, not currently breastfeeding.   Patient refuse rectal wound treatment c/o burning sensastion while voiding team was paged.MD recommended to use more Calmoseptine cream.

## 2022-08-06 NOTE — PLAN OF CARE
[id number]  AVSS, alert and oriented x 4, ventura nausea/sob. C/o pain at rectum incision, given PRN oxycodone x 3. Up independent to bedside commode, 1 BM for the shift, not urinate, bladder scan is 366 ml, 489 ml and needs to straight cath.BG @ 1700 is 114, no sliding scale needed, BG @ 2200 is 127, no sliding scale needed.  Continue to monitor care.

## 2022-08-06 NOTE — TELEPHONE ENCOUNTER
Routing refill request to provider for review/approval because:  Labs not current:  Creatinine Clearance

## 2022-08-06 NOTE — PROGRESS NOTES
[facility]    Progress Note - Medicine Service, [facility] TEAM 1       Date of Admission:  7/26/2022    Assessment & Plan        Hilaria Bonner is a 31 year old female admitted on 7/26/2022. She has a history of HTN, T2DM, alcohol use, and prior PE c/b subsequent PEA arrest on Xarelto, fatty liver disease and anxiety who is admitted for hypotensive shock and anemia likely secondary to hemorrhoidal bleeding.     Changes today:  -Urinary retention of 700 ml this AM requiring straight cath. Given 10 mg IV lasix once w/ good spontaneous urinary output  -Hgb 7.2 this AM--> 10.1 on afternoon recheck   -continuing apixaban     #Rectal bleeding s/p mixed hemorrhoidectomy   #Hypovolemic shock 2/2 blood loss vs. Dehydration, improving  P/w 3 weeks of hematochezia with associated lightheadedness and fatigue while on Xarelto. Hypotensive with improvement after repeat LR boluses. Baseline hgb 9-10. On arrival, Hgb 6.4, improved to 7.8 after 2 U pRBC. CTA abd/pelvis negative for active extravasation. Infectious stool panel negative. EGD/Colonoscopy performed 7/28 without evidence of acute bleed but presence of hemorrhoids. Hgb continued to wax and wane between 7-9 w/ ongoing bloody stools. Hemorrhoidectomy performed 8/3 with improvement in rectal bleeding.   -Colorectal surgery following:   -Hemorrhoidectomy performed 8/3--went well   -Miralax added for soft stools. Continue metamucil.    -Continue sitz baths 3-4x daily and tuck 4x4 gauze    -Restarted apixaban 8/4  -BP stable  -AM CBC checks    #Chronic macrocytic anemia, acutely worse   #Reticulocytosis   Baseline Hgb ~9-10 since 2019 (appears to be normal years prior) w/ MCV ~100. Likely 2/2 alcohol use disorder vs drug induced macrocytosis (is on chronic folate/B12 supplementation and both folate/B12 levels have been normal). Hgb 6.4 on arrival w/ elevated reticulocyte count consistent with anemia from hemorrhoidal bleeding. Hgb  dropped to 6.8-->6.9 on 8/5 (< 48 hours s/p hemorrhoidectomy). Received 1 unit pRBCs. Hgb improved to 10.1 today.   -Repeat CBC in AM    #. Acute urinary retention, resolving  400 ml of urine retention post-hemorrhoidectomy, likely 2/2 inflammation/swelling from procedure vs prazosin being held 2/2 hypotension. Indwelling dougherty placed by urology-> now removed. Required straight cath overnight but now voiding spontaneously     -Continue prazosin   -Continue to monitor urine output w/ bladder scans PRN (per protocol)     #. Acute on chronic sinus tachycardia--stable  Resting -115 with intermittent acceleration with ambulation. Says that this has been normal for her since her PE. Pt denies SOB, chest pain, or palpitations.  Sinus rhythm on cardiac monitoring x2 days--now stopped. No evidence for infection on exam and white count normal. Continues to be satting well on room air. Tachycardia improved to low 100's after IV hydration. Suspect tachycardia 2/2 dehydration in the setting of ARAMIS, hypotension, and lactic acidosis vs an autonomic dysfunction.   -Will continue to monitor vitals     #. Mouth pain   C/o tongue pain over the past few days. No relief with magic mouthwash. Also has cracking/pain on corners of mouth. Could be due to dry air vs vitamin deficiency vs medication induced  -B2 level pending  -Biotene mouth wash 4x daily PRN  -Vaseline for lips/corners of mouth     #. Extremity Pains  #. BLE edema, non-pitting  #. Hypomagnesia   Describes vague aches and pains in her arms and legs. Different from her neuropathy. CK normal. BLE US negative 8/1.   -Continue magnesium replacement protocol     #ARAMIS - resolved  Pt presented with creatinine elevated to 2.18 on admission with baseline at 0.7-0.8. Likely due to prerenal hypotension in setting of rectal bleed vs. dehydration. Improved with pRBC and fluid resuscitation.     #Lactic acidosis, resolved  Bicarb improved with volume resuscitation. Suspect  contribution of elevated lactate (6/7 on admit, now down to 2.2), hypotension (resolving) and anemia (transfused).     #. Trichomonas Vaginalis   Called by PCP 8/3 to report that pt's recent PAP smear came back positive for trichomonas. Pt denies any sxs.   -Continue Flagyl 500 mg BID x 7 day course    Chronic Medical Problems  #Hx of PE c/b PEA arrest in 2018  -PTA Xarelto held on arrival 2/2 concern for GI bleed. Started on apixaban 7/28 after bleed not found on EGD/C. May hold apixaban PM if Hgb continues to decline at recheck 7/30   -ECHO showed normal global and regional left ventricular function with EF of 55-60%. No changes compared to previous study.  -Will continue to monitor     #Chronic peripheral neuropathy  -PTA Pregabalin     #Type 2 diabetes  A1c on admission 5.4. Glucose in low 100's since admission   -Hold PTA metformin  -monitor on BMP daily, will increase frequency if needed    #Chronic anxiety  #Nightmares  -Continue PTA Venlafaxine  -restarted Zyprexa   -restarted prazosin    #Obstructive Sleep Apnea  -CPAP at night    #Chronic hypertension  -Hold PTA Lisinopril and hydrochlorothiazide while normotensive and intermittently hypotensive.     #R foot injury   Tripped and fell from standing height on 6/29, inverting her R ankle. Seen at [facility] in [place] 6/30. R ankle XRs negative; R foot XRs with ? Calcaneal fracture at the calcaneal cuboid joint. Given CAM boot, which she has been wearing since. Continued to have R foot pain on admission. Repeat R foot XR without calcaneal fracture but there are ossific fragment projections over the proximal second metatarsal. No further workup or intervention recommended, per Ortho.   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 Appears pt has been following with neurology. Sx onset April 2020 4 weeks post-covid. NCS in 7/20 showed absent  sensory responses in the upper and lower limbs and normal motor responses. Has been on maintenance IVIG q3 weeks (recently decreased to [id number]) since then and sx managed well with this per last neuro note 5/9/22.  -IVIG infusion given 8/4    #Hx of alcohol abuse  #Hx of alcohol induced pancreatitis   Used to drink 1L vodka daily. Hospitalized Feb. 2021 for alcohol induced pancreatitis and has been sober since. Hepatosteatosis noted on CTAP 7/26/22.       Diet: Regular Diet Adult    DVT Prophylaxis: apixaban 5 mg BID  Gonzales Catheter: Not present  Fluids: LR fluid bolus  Central Lines: None  Cardiac Monitoring: None  Code Status: Full Code      The patient's care was discussed with the Attending Physician, Dr. Caceres and Patient.    Jordyn Rivas DO  Medicine Service, [facility] TEAM [address] [place] [address]  Securely message with the Vocera Web Console (learn more here)  Text page via [email or web address] Paging/Directory   Please see signed in provider for up to date coverage information      Clinically Significant Risk Factors Present on Admission                     ______________________________________________________________________    Interval History   700 ml urinary retention overnight, requiring straight cath   Hgb 7.2 this AM  Had BM overnight--no blood in stool or toilet but did have some on toilet paper when she wiped    Continuing to have rectal pain and mouth pain.     Agrees to work with PT and OT so that she can eventually go to TCU. Would appreciate knowing when they are coming so she can make sure to be in her room waiting.     4 pt ROS otherwise negative.     Data reviewed today: I reviewed all medications, new labs and imaging results over the last 24 hours.    Physical Exam   Vital Signs: Temp: 98  F (36.7  C) Temp src: Oral BP: 110/67 Pulse: 88   Resp: 20 SpO2: 99 % O2 Device: None (Room air)    Weight: 311 lbs 4.63 oz  General Appearance: No acute distress   Respiratory: clear  lung sounds, no increased work of breathing  Cardiovascular: regular rate and rhythm, no murmur   GI: Non distended. Soft, non tender  Extremities: 2+ pitting edema in extremities   Other: Alert and awake     Data   Recent Labs   Lab 08/06/22  0758 08/06/22  0651 08/05/22  2148 08/05/22  1831 08/05/22  1702 08/05/22  1258 08/05/22  1146 08/05/22  0847 08/05/22  0822 08/04/22  0755 08/04/22  0503 08/03/22  0632 08/03/22  0629 08/02/22  1926 08/02/22  1543 08/02/22  0933 08/02/22  0719   WBC  --  8.1  --   --   --   --   --   --  7.6  --   --   --  10.3  --   --   --  9.8   HGB  --  7.2*  --  7.9*  --   --  6.9*  --  6.8*   < >  --   --  7.7*  --   --    < > 7.2*   MCV  --  110*  --   --   --   --   --   --  112*  --   --   --  108*  --   --   --  107*   PLT  --  167  --   --   --   --   --   --  227  --   --   --  239  --   --   --  237   INR  --   --   --   --   --   --   --   --   --   --   --   --  1.15  --  1.13  --   --    NA  --   --   --   --   --   --   --   --  137  --  138  --  138  --   --   --  138   POTASSIUM  --   --   --   --   --   --   --   --  3.7  --  3.8  --  3.5  --   --   --  3.7   CHLORIDE  --   --   --   --   --   --   --   --  106  --  107  --  106  --   --   --  108*   CO2  --   --   --   --   --   --   --   --  20*  --  20*  --  21*  --   --   --  20*   BUN  --   --   --   --   --   --   --   --  3.9*  --  3.3*  --  3.6*  --   --   --  6.3   CR  --   --   --   --   --   --   --   --  0.92  --  0.95  --  0.87  --   --   --  1.09*   ANIONGAP  --   --   --   --   --   --   --   --  11  --  11  --  11  --   --   --  10   QUINCY  --   --   --   --   --   --   --   --  7.7*  --  7.2*  --  7.1*  --   --   --  7.5*   *  --  127*  --  114*   < >  --    < > 138*   < > 147*   < > 104*   < >  --    < > 124*   ALBUMIN  --   --   --   --   --   --   --   --   --   --   --   --   --   --   --   --  2.3*   PROTTOTAL  --   --   --   --   --   --   --   --   --   --   --   --   --   --   --   --  5.1*    BILITOTAL  --   --   --   --   --   --   --   --   --   --   --   --   --   --   --   --  2.1*   ALKPHOS  --   --   --   --   --   --   --   --   --   --   --   --   --   --   --   --  218*   ALT  --   --   --   --   --   --   --   --   --   --   --   --   --   --   --   --  102*   AST  --   --   --   --   --   --   --   --   --   --   --   --   --   --   --   --  189*    < > = values in this interval not displayed.

## 2022-08-06 NOTE — PLAN OF CARE
[id number]    OVSS. Continues to have rectal pain, no PRN medication needed overnight. Patient unable to void, straight cathed for 700 mL. Patient reported voiding spontaneously later, did not save urine. Slept between cares, refused overnight VS and blood glucose check. Continue w/ POC.

## 2022-08-07 LAB
ANION GAP SERPL CALCULATED.3IONS-SCNC: 8 MMOL/L (ref 7–15)
BUN SERPL-MCNC: 4.4 MG/DL (ref 6–20)
CALCIUM SERPL-MCNC: 8.3 MG/DL (ref 8.6–10)
CHLORIDE SERPL-SCNC: 111 MMOL/L (ref 98–107)
CREAT SERPL-MCNC: 0.88 MG/DL (ref 0.51–0.95)
DEPRECATED HCO3 PLAS-SCNC: 21 MMOL/L (ref 22–29)
ERYTHROCYTE [DISTWIDTH] IN BLOOD BY AUTOMATED COUNT: 20.2 % (ref 10–15)
GFR SERPL CREATININE-BSD FRML MDRD: 90 ML/MIN/1.73M2
GLUCOSE BLDC GLUCOMTR-MCNC: 103 MG/DL (ref 70–99)
GLUCOSE BLDC GLUCOMTR-MCNC: 115 MG/DL (ref 70–99)
GLUCOSE BLDC GLUCOMTR-MCNC: 116 MG/DL (ref 70–99)
GLUCOSE BLDC GLUCOMTR-MCNC: 139 MG/DL (ref 70–99)
GLUCOSE BLDC GLUCOMTR-MCNC: 98 MG/DL (ref 70–99)
GLUCOSE SERPL-MCNC: 110 MG/DL (ref 70–99)
HCT VFR BLD AUTO: 25.5 % (ref 35–47)
HGB BLD-MCNC: 7.6 G/DL (ref 11.7–15.7)
MCH RBC QN AUTO: 33.5 PG (ref 26.5–33)
MCHC RBC AUTO-ENTMCNC: 29.8 G/DL (ref 31.5–36.5)
MCV RBC AUTO: 112 FL (ref 78–100)
PLATELET # BLD AUTO: 230 10E3/UL (ref 150–450)
POTASSIUM SERPL-SCNC: 3.6 MMOL/L (ref 3.4–5.3)
RBC # BLD AUTO: 2.27 10E6/UL (ref 3.8–5.2)
SODIUM SERPL-SCNC: 140 MMOL/L (ref 136–145)
WBC # BLD AUTO: 7.9 10E3/UL (ref 4–11)

## 2022-08-07 PROCEDURE — 250N000013 HC RX MED GY IP 250 OP 250 PS 637: Performed by: COLON & RECTAL SURGERY

## 2022-08-07 PROCEDURE — 250N000013 HC RX MED GY IP 250 OP 250 PS 637

## 2022-08-07 PROCEDURE — 999N000157 HC STATISTIC RCP TIME EA 10 MIN

## 2022-08-07 PROCEDURE — 36415 COLL VENOUS BLD VENIPUNCTURE: CPT

## 2022-08-07 PROCEDURE — 250N000011 HC RX IP 250 OP 636

## 2022-08-07 PROCEDURE — 82310 ASSAY OF CALCIUM: CPT

## 2022-08-07 PROCEDURE — C9113 INJ PANTOPRAZOLE SODIUM, VIA: HCPCS

## 2022-08-07 PROCEDURE — 250N000011 HC RX IP 250 OP 636: Performed by: STUDENT IN AN ORGANIZED HEALTH CARE EDUCATION/TRAINING PROGRAM

## 2022-08-07 PROCEDURE — 99232 SBSQ HOSP IP/OBS MODERATE 35: CPT | Mod: GC | Performed by: PEDIATRICS

## 2022-08-07 PROCEDURE — 250N000013 HC RX MED GY IP 250 OP 250 PS 637: Performed by: STUDENT IN AN ORGANIZED HEALTH CARE EDUCATION/TRAINING PROGRAM

## 2022-08-07 PROCEDURE — 85027 COMPLETE CBC AUTOMATED: CPT

## 2022-08-07 PROCEDURE — 120N000002 HC R&B MED SURG/OB UMMC

## 2022-08-07 RX ORDER — FUROSEMIDE 10 MG/ML
10 INJECTION INTRAMUSCULAR; INTRAVENOUS ONCE
Status: COMPLETED | OUTPATIENT
Start: 2022-08-07 | End: 2022-08-07

## 2022-08-07 RX ORDER — FUROSEMIDE 10 MG/ML
10 INJECTION INTRAMUSCULAR; INTRAVENOUS ONCE
Status: DISCONTINUED | OUTPATIENT
Start: 2022-08-07 | End: 2022-08-07

## 2022-08-07 RX ADMIN — VENLAFAXINE HYDROCHLORIDE 225 MG: 150 CAPSULE, EXTENDED RELEASE ORAL at 09:00

## 2022-08-07 RX ADMIN — PREGABALIN 200 MG: 100 CAPSULE ORAL at 08:54

## 2022-08-07 RX ADMIN — ANORECTAL OINTMENT: 15.7; .44; 24; 20.6 OINTMENT TOPICAL at 13:19

## 2022-08-07 RX ADMIN — POTASSIUM CHLORIDE 20 MEQ: 750 TABLET, EXTENDED RELEASE ORAL at 08:55

## 2022-08-07 RX ADMIN — OXYCODONE HYDROCHLORIDE 10 MG: 5 TABLET ORAL at 02:03

## 2022-08-07 RX ADMIN — HYDROMORPHONE HYDROCHLORIDE 2 MG: 2 TABLET ORAL at 16:29

## 2022-08-07 RX ADMIN — OXYCODONE HYDROCHLORIDE 10 MG: 5 TABLET ORAL at 14:05

## 2022-08-07 RX ADMIN — ANORECTAL OINTMENT: 15.7; .44; 24; 20.6 OINTMENT TOPICAL at 09:01

## 2022-08-07 RX ADMIN — POLYETHYLENE GLYCOL 3350 17 G: 17 POWDER, FOR SOLUTION ORAL at 08:54

## 2022-08-07 RX ADMIN — PANTOPRAZOLE SODIUM 40 MG: 40 INJECTION, POWDER, FOR SOLUTION INTRAVENOUS at 08:54

## 2022-08-07 RX ADMIN — FUROSEMIDE 10 MG: 10 INJECTION, SOLUTION INTRAMUSCULAR; INTRAVENOUS at 09:53

## 2022-08-07 RX ADMIN — PREGABALIN 200 MG: 100 CAPSULE ORAL at 20:25

## 2022-08-07 RX ADMIN — FUROSEMIDE 10 MG: 10 INJECTION, SOLUTION INTRAMUSCULAR; INTRAVENOUS at 15:05

## 2022-08-07 RX ADMIN — HYDROMORPHONE HYDROCHLORIDE 2 MG: 2 TABLET ORAL at 20:27

## 2022-08-07 RX ADMIN — PSYLLIUM HUSK 1 PACKET: 3.4 POWDER ORAL at 08:54

## 2022-08-07 RX ADMIN — FOLIC ACID 1 MG: 1 TABLET ORAL at 08:56

## 2022-08-07 RX ADMIN — OXYCODONE HYDROCHLORIDE 10 MG: 5 TABLET ORAL at 09:25

## 2022-08-07 RX ADMIN — METHOCARBAMOL 1000 MG: 500 TABLET ORAL at 18:09

## 2022-08-07 RX ADMIN — Medication 1 TABLET: at 08:55

## 2022-08-07 RX ADMIN — PREGABALIN 200 MG: 100 CAPSULE ORAL at 13:18

## 2022-08-07 RX ADMIN — PANTOPRAZOLE SODIUM 40 MG: 40 INJECTION, POWDER, FOR SOLUTION INTRAVENOUS at 20:25

## 2022-08-07 RX ADMIN — APIXABAN 5 MG: 5 TABLET, FILM COATED ORAL at 08:56

## 2022-08-07 RX ADMIN — WITCH HAZEL: 500 SOLUTION RECTAL; TOPICAL at 09:20

## 2022-08-07 RX ADMIN — APIXABAN 5 MG: 5 TABLET, FILM COATED ORAL at 20:25

## 2022-08-07 RX ADMIN — METRONIDAZOLE 500 MG: 500 TABLET ORAL at 08:55

## 2022-08-07 RX ADMIN — METRONIDAZOLE 500 MG: 500 TABLET ORAL at 20:25

## 2022-08-07 RX ADMIN — WITCH HAZEL: 500 SOLUTION RECTAL; TOPICAL at 16:30

## 2022-08-07 ASSESSMENT — ACTIVITIES OF DAILY LIVING (ADL)
ADLS_ACUITY_SCORE: 46
ADLS_ACUITY_SCORE: 42
ADLS_ACUITY_SCORE: 46
ADLS_ACUITY_SCORE: 42

## 2022-08-07 NOTE — PLAN OF CARE
[id number]  AVSS, alert and oriented x 4, denies nausea/sob. Pain at rectal, pain is 8/10, given PRN oxycodone x 2.  Up independent, using bedside commode. Ambulated in the [address] couple times. PIV is intact and saline locked. BG @ 1800 is 99, no sliding scale needed. BG @ 2200 is 111, no sliding scale needed.  Bladder scan is 66 ml @ 2130 and pt voided once @ 1500 per pt.  Continue to monitor care.

## 2022-08-07 NOTE — PROGRESS NOTES
[facility]    Progress Note - Medicine Service, [facility] TEAM 1       Date of Admission:  7/26/2022    Assessment & Plan        Hilaria Bonner is a 31 year old female admitted on 7/26/2022. She has a history of HTN, T2DM, alcohol use, and prior PE c/b subsequent PEA arrest on Xarelto, fatty liver disease and anxiety who is admitted for hypotensive shock and anemia likely secondary to hemorrhoidal bleeding.     Changes today:  - hgb stable on recheck   - voiding spontaneously   - lasix 10 mg x1 this AM and re-dosing afternoon to meet UOP goal   - change oxy to PO dilaudid given persistent pain   -----------------------------------------------------------------------------------------------------    #Rectal bleeding s/p mixed hemorrhoidectomy   #Hypovolemic shock 2/2 blood loss vs. Dehydration, improving  P/w 3 weeks of hematochezia with associated lightheadedness and fatigue while on Xarelto. Hypotensive with improvement after repeat LR boluses. Baseline hgb 9-10. On arrival, Hgb 6.4, improved to 7.8 after 2 U pRBC. CTA abd/pelvis negative for active extravasation. Infectious stool panel negative. EGD/Colonoscopy performed 7/28 without evidence of acute bleed but presence of hemorrhoids. Hgb continued to wax and wane between 7-9 w/ ongoing bloody stools. Hemorrhoidectomy performed 8/3 with improvement in rectal bleeding.   -Colorectal surgery following:   -Hemorrhoidectomy performed 8/3--went well   -Miralax added for soft stools. Continue metamucil.    -Continue sitz baths 3-4x daily and tuck 4x4 gauze    -Restarted apixaban 8/4  -BP stable  -AM CBC checks    #Chronic macrocytic anemia - stable   #Reticulocytosis   Baseline Hgb ~9-10 since 2019 (appears to be normal years prior) w/ MCV ~100. Likely 2/2 alcohol use disorder vs drug induced macrocytosis (is on chronic folate/B12 supplementation and both folate/B12 levels have been normal). Hgb 6.4 on arrival w/ elevated  reticulocyte count consistent with anemia from hemorrhoidal bleeding. Hgb dropped to 6.8-->6.9 on 8/5 (< 48 hours s/p hemorrhoidectomy). Received 1 unit pRBCs. Afternoon check of hgb 8/6 up to 10.1--suspect lab error as back to 7-range today.   -Repeat CBC in AM    #. Acute urinary retention, resolving  400 ml of urine retention post-hemorrhoidectomy, likely 2/2 inflammation/swelling from procedure vs prazosin being held 2/2 hypotension. Indwelling dougherty placed by urology-> now removed. Required straight cath overnight but now voiding spontaneously     -Continue prazosin   -Continue to monitor urine output w/ bladder scans PRN (per protocol)     #. Acute on chronic sinus tachycardia--stable  Resting -115 with intermittent acceleration with ambulation. Says that this has been normal for her since her PE. Pt denies SOB, chest pain, or palpitations.  Sinus rhythm on cardiac monitoring x2 days--now stopped. No evidence for infection on exam and white count normal. Continues to be satting well on room air. Tachycardia improved to low 100's after IV hydration. Suspect tachycardia 2/2 dehydration in the setting of ARAMIS, hypotension, and lactic acidosis vs an autonomic dysfunction.   -Will continue to monitor vitals     #. Mouth pain   C/o tongue pain over the past few days. No relief with magic mouthwash. Also has cracking/pain on corners of mouth. Could be due to dry air vs vitamin deficiency vs medication induced  -B2 level pending  -Biotene mouth wash 4x daily PRN  -Vaseline for lips/corners of mouth     #. Extremity Pains  #. BLE edema, non-pitting  #. Hypomagnesia   Describes vague aches and pains in her arms and legs. Different from her neuropathy. CK normal. BLE US negative 8/1.   -Continue magnesium replacement protocol   - Lasix spot-dosing right now for goal 1-2L net neg daily     #ARAMIS - resolved  Pt presented with creatinine elevated to 2.18 on admission with baseline at 0.7-0.8. Likely due to prerenal  hypotension in setting of rectal bleed vs. dehydration. Improved with pRBC and fluid resuscitation.     #Lactic acidosis, resolved  Bicarb improved with volume resuscitation. Suspect contribution of elevated lactate (6/7 on admit, now down to 2.2), hypotension (resolving) and anemia (transfused).     #. Trichomonas Vaginalis   Called by PCP 8/3 to report that pt's recent PAP smear came back positive for trichomonas. Pt denies any sxs.   -Continue Flagyl 500 mg BID x 7 day course    Chronic Medical Problems  #Hx of PE c/b PEA arrest in 2018  -PTA Xarelto held on arrival 2/2 concern for GI bleed. Started on apixaban 7/28 after bleed not found on EGD/C. May hold apixaban PM if Hgb continues to decline at recheck 7/30   -ECHO showed normal global and regional left ventricular function with EF of 55-60%. No changes compared to previous study.  -Will continue to monitor     #Chronic peripheral neuropathy  -PTA Pregabalin     #Type 2 diabetes  A1c on admission 5.4. Glucose in low 100's since admission   -Hold PTA metformin  -monitor on BMP daily, will increase frequency if needed    #Chronic anxiety  #Nightmares  -Continue PTA Venlafaxine  -restarted Zyprexa   -restarted prazosin    #Obstructive Sleep Apnea  -CPAP at night    #Chronic hypertension  -Hold PTA Lisinopril and hydrochlorothiazide while normotensive and intermittently hypotensive.     #R foot injury   Tripped and fell from standing height on 6/29, inverting her R ankle. Seen at [facility] in [place] 6/30. R ankle XRs negative; R foot XRs with ? Calcaneal fracture at the calcaneal cuboid joint. Given CAM boot, which she has been wearing since. Continued to have R foot pain on admission. Repeat R foot XR without calcaneal fracture but there are ossific fragment projections over the proximal second metatarsal. No further workup or intervention recommended, per Ortho.   -OK to bear weight as tolerated and continue to wear CAM boot per pt's comfort     #Non-length dependent  sensory predominant neuropathy vs ganglionopathy  Per chart review. Appears pt has been following with neurology. Sx onset April 2020 4 weeks post-covid. NCS in 7/20 showed absent sensory responses in the upper and lower limbs and normal motor responses. Has been on maintenance IVIG q3 weeks (recently decreased to [id number]) since then and sx managed well with this per last neuro note 5/9/22.  -IVIG infusion given 8/4    #Hx of alcohol abuse  #Hx of alcohol induced pancreatitis   Used to drink 1L vodka daily. Hospitalized Feb. 2021 for alcohol induced pancreatitis and has been sober since. Hepatosteatosis noted on CTAP 7/26/22.       Diet: Regular Diet Adult    DVT Prophylaxis: apixaban 5 mg BID  Gonzales Catheter: Not present  Fluids: LR fluid bolus  Central Lines: None  Cardiac Monitoring: None  Code Status: Full Code      The patient's care was discussed with the Attending Physician, Dr. Caceres and Patient.    Jordyn Rivas, DO  Medicine Service, [facility] TEAM [address] [place] [address]  Securely message with the Vocera Web Console (learn more here)  Text page via [facility] Paging/Directory   Please see signed in provider for up to date coverage information      Clinically Significant Risk Factors Present on Admission                     ______________________________________________________________________    Interval History   No urinary retention overnight. States she had an acute episode of pain when she sat down hard on commode yesterday. She is unsure whether she had bleeding after this. She had episode of incontinence of urine with lasix. Slept well overnight.     4 pt ROS otherwise negative.     Data reviewed today: I reviewed all medications, new labs and imaging results over the last 24 hours.    Physical Exam   Vital Signs: Temp: 97.6  F (36.4  C) Temp src: Oral BP: 110/62 Pulse: 109   Resp: 20 SpO2: 98 % O2 Device: None (Room air)    Weight: 311 lbs 4.63 oz  General Appearance: No  acute distress   Respiratory: clear lung sounds, no increased work of breathing  Cardiovascular: regular rate and rhythm, no murmur   GI: Non distended. Soft, non tender  Extremities: 2+ pitting edema in extremities   Other: Alert and awake     Data   Recent Labs   Lab 08/07/22  0708 08/07/22  0212 08/06/22  2232 08/06/22  1822 08/06/22  1307 08/06/22  0758 08/06/22  0651 08/05/22  0847 08/05/22  0822 08/04/22  0755 08/04/22  0503 08/03/22  0632 08/03/22  0629 08/02/22  1926 08/02/22  1543 08/02/22  0933 08/02/22  0719   WBC 7.9  --   --   --   --   --  8.1  --  7.6  --   --   --  10.3  --   --   --  9.8   HGB 7.6*  --   --   --  10.1*  --  7.2*   < > 6.8*   < >  --   --  7.7*  --   --    < > 7.2*   *  --   --   --   --   --  110*  --  112*  --   --   --  108*  --   --   --  107*     --   --   --   --   --  167  --  227  --   --   --  239  --   --   --  237   INR  --   --   --   --   --   --   --   --   --   --   --   --  1.15  --  1.13  --   --      --   --   --   --   --   --   --  137  --  138  --  138  --   --   --  138   POTASSIUM 3.6  --   --   --   --   --   --   --  3.7  --  3.8  --  3.5  --   --   --  3.7   CHLORIDE 111*  --   --   --   --   --   --   --  106  --  107  --  106  --   --   --  108*   CO2 21*  --   --   --   --   --   --   --  20*  --  20*  --  21*  --   --   --  20*   BUN 4.4*  --   --   --   --   --   --   --  3.9*  --  3.3*  --  3.6*  --   --   --  6.3   CR 0.88  --   --   --   --   --   --   --  0.92  --  0.95  --  0.87  --   --   --  1.09*   ANIONGAP 8  --   --   --   --   --   --   --  11  --  11  --  11  --   --   --  10   QUINCY 8.3*  --   --   --   --   --   --   --  7.7*  --  7.2*  --  7.1*  --   --   --  7.5*   * 139* 111*   < >  --    < >  --    < > 138*   < > 147*   < > 104*   < >  --    < > 124*   ALBUMIN  --   --   --   --   --   --   --   --   --   --   --   --   --   --   --   --  2.3*   PROTTOTAL  --   --   --   --   --   --   --   --   --   --   --    --   --   --   --   --  5.1*   BILITOTAL  --   --   --   --   --   --   --   --   --   --   --   --   --   --   --   --  2.1*   ALKPHOS  --   --   --   --   --   --   --   --   --   --   --   --   --   --   --   --  218*   ALT  --   --   --   --   --   --   --   --   --   --   --   --   --   --   --   --  102*   AST  --   --   --   --   --   --   --   --   --   --   --   --   --   --   --   --  189*    < > = values in this interval not displayed.

## 2022-08-07 NOTE — PLAN OF CARE
Goal Outcome Evaluation:    AVSS on R.A. A&O x 4 c/o pain 7/10 prn oxycodone managed  the pain.Independent with bedside commode. Voiding without saving no bm no bleeding from rectal,Calmoseptine cream and tuck pad applied with relief.Patient frequently going out side to smoke.

## 2022-08-07 NOTE — PLAN OF CARE
[id number]    Vital signs:  Temp: 97.6  F (36.4  C) Temp src: Oral BP: 110/62 Pulse: 109   Resp: 20 SpO2: 98 % O2 Device: None (Room air) Oxygen Delivery: 2 LPM   Weight: 141.2 kg (311 lb 4.6 oz)    Reason for admission:   Activity: SBA/ Ast x1  Pain: 8-10/10 in rectum, managed with PRN oxycodone  Neuro: A&Ox4, makes needs known  Cardiac: Denies chest pain/tightness  Respiratory: Denies SOB, sating 98% on RA  GI/: Denies n/v, voids spontaneously, no abd discomfort. Blood tinged BM, rectum painful to touch  Diet: Regular  Lines: PIV NS locked  Wounds: interdry placed between abd folds  Labs/imaging: Reviewed.     Changes:  Low BP this AM, 250cc LR bolus admin per orders, provider updated with post infusion BP, day RN aware      Continue with POC

## 2022-08-08 ENCOUNTER — APPOINTMENT (OUTPATIENT)
Dept: OCCUPATIONAL THERAPY | Facility: CLINIC | Age: 32
End: 2022-08-08
Payer: COMMERCIAL

## 2022-08-08 ENCOUNTER — DOCUMENTATION ONLY (OUTPATIENT)
Dept: PSYCHOLOGY | Facility: CLINIC | Age: 32
End: 2022-08-08

## 2022-08-08 LAB
ANION GAP SERPL CALCULATED.3IONS-SCNC: 9 MMOL/L (ref 7–15)
BUN SERPL-MCNC: 4.3 MG/DL (ref 6–20)
CALCIUM SERPL-MCNC: 8.3 MG/DL (ref 8.6–10)
CHLORIDE SERPL-SCNC: 109 MMOL/L (ref 98–107)
CREAT SERPL-MCNC: 0.82 MG/DL (ref 0.51–0.95)
DEPRECATED HCO3 PLAS-SCNC: 21 MMOL/L (ref 22–29)
ERYTHROCYTE [DISTWIDTH] IN BLOOD BY AUTOMATED COUNT: 20.3 % (ref 10–15)
GFR SERPL CREATININE-BSD FRML MDRD: >90 ML/MIN/1.73M2
GLUCOSE BLDC GLUCOMTR-MCNC: 116 MG/DL (ref 70–99)
GLUCOSE BLDC GLUCOMTR-MCNC: 120 MG/DL (ref 70–99)
GLUCOSE BLDC GLUCOMTR-MCNC: 130 MG/DL (ref 70–99)
GLUCOSE BLDC GLUCOMTR-MCNC: 165 MG/DL (ref 70–99)
GLUCOSE SERPL-MCNC: 116 MG/DL (ref 70–99)
HCT VFR BLD AUTO: 24.9 % (ref 35–47)
HGB BLD-MCNC: 7.4 G/DL (ref 11.7–15.7)
MAGNESIUM SERPL-MCNC: 1.5 MG/DL (ref 1.7–2.3)
MAGNESIUM SERPL-MCNC: 1.8 MG/DL (ref 1.7–2.3)
MCH RBC QN AUTO: 33.8 PG (ref 26.5–33)
MCHC RBC AUTO-ENTMCNC: 29.7 G/DL (ref 31.5–36.5)
MCV RBC AUTO: 114 FL (ref 78–100)
PLATELET # BLD AUTO: 200 10E3/UL (ref 150–450)
POTASSIUM SERPL-SCNC: 3.2 MMOL/L (ref 3.4–5.3)
POTASSIUM SERPL-SCNC: 3.9 MMOL/L (ref 3.4–5.3)
RBC # BLD AUTO: 2.19 10E6/UL (ref 3.8–5.2)
SODIUM SERPL-SCNC: 139 MMOL/L (ref 136–145)
WBC # BLD AUTO: 8.2 10E3/UL (ref 4–11)

## 2022-08-08 PROCEDURE — 84132 ASSAY OF SERUM POTASSIUM: CPT

## 2022-08-08 PROCEDURE — 250N000013 HC RX MED GY IP 250 OP 250 PS 637: Performed by: STUDENT IN AN ORGANIZED HEALTH CARE EDUCATION/TRAINING PROGRAM

## 2022-08-08 PROCEDURE — 36415 COLL VENOUS BLD VENIPUNCTURE: CPT

## 2022-08-08 PROCEDURE — 120N000002 HC R&B MED SURG/OB UMMC

## 2022-08-08 PROCEDURE — 83735 ASSAY OF MAGNESIUM: CPT | Performed by: STUDENT IN AN ORGANIZED HEALTH CARE EDUCATION/TRAINING PROGRAM

## 2022-08-08 PROCEDURE — 36415 COLL VENOUS BLD VENIPUNCTURE: CPT | Performed by: STUDENT IN AN ORGANIZED HEALTH CARE EDUCATION/TRAINING PROGRAM

## 2022-08-08 PROCEDURE — 80048 BASIC METABOLIC PNL TOTAL CA: CPT | Performed by: STUDENT IN AN ORGANIZED HEALTH CARE EDUCATION/TRAINING PROGRAM

## 2022-08-08 PROCEDURE — 258N000003 HC RX IP 258 OP 636

## 2022-08-08 PROCEDURE — 250N000013 HC RX MED GY IP 250 OP 250 PS 637: Performed by: COLON & RECTAL SURGERY

## 2022-08-08 PROCEDURE — 999N000157 HC STATISTIC RCP TIME EA 10 MIN

## 2022-08-08 PROCEDURE — 250N000011 HC RX IP 250 OP 636

## 2022-08-08 PROCEDURE — 250N000013 HC RX MED GY IP 250 OP 250 PS 637

## 2022-08-08 PROCEDURE — C9113 INJ PANTOPRAZOLE SODIUM, VIA: HCPCS

## 2022-08-08 PROCEDURE — 85027 COMPLETE CBC AUTOMATED: CPT | Performed by: STUDENT IN AN ORGANIZED HEALTH CARE EDUCATION/TRAINING PROGRAM

## 2022-08-08 PROCEDURE — 97140 MANUAL THERAPY 1/> REGIONS: CPT | Mod: GO

## 2022-08-08 PROCEDURE — 99233 SBSQ HOSP IP/OBS HIGH 50: CPT | Mod: GC | Performed by: PEDIATRICS

## 2022-08-08 PROCEDURE — 83735 ASSAY OF MAGNESIUM: CPT

## 2022-08-08 PROCEDURE — 94660 CPAP INITIATION&MGMT: CPT

## 2022-08-08 RX ORDER — MAGNESIUM SULFATE HEPTAHYDRATE 40 MG/ML
2 INJECTION, SOLUTION INTRAVENOUS ONCE
Status: COMPLETED | OUTPATIENT
Start: 2022-08-08 | End: 2022-08-08

## 2022-08-08 RX ORDER — POTASSIUM CHLORIDE 7.45 MG/ML
10 INJECTION INTRAVENOUS
Status: COMPLETED | OUTPATIENT
Start: 2022-08-08 | End: 2022-08-08

## 2022-08-08 RX ORDER — HYDROMORPHONE HYDROCHLORIDE 2 MG/1
4 TABLET ORAL EVERY 4 HOURS PRN
Status: DISCONTINUED | OUTPATIENT
Start: 2022-08-08 | End: 2022-08-10

## 2022-08-08 RX ORDER — POTASSIUM CHLORIDE 750 MG/1
20 TABLET, EXTENDED RELEASE ORAL ONCE
Status: DISCONTINUED | OUTPATIENT
Start: 2022-08-08 | End: 2022-08-10

## 2022-08-08 RX ADMIN — HYDROMORPHONE HYDROCHLORIDE 4 MG: 2 TABLET ORAL at 21:19

## 2022-08-08 RX ADMIN — HYDROMORPHONE HYDROCHLORIDE 4 MG: 2 TABLET ORAL at 16:31

## 2022-08-08 RX ADMIN — APIXABAN 5 MG: 5 TABLET, FILM COATED ORAL at 10:43

## 2022-08-08 RX ADMIN — POTASSIUM CHLORIDE 10 MEQ: 7.46 INJECTION, SOLUTION INTRAVENOUS at 10:19

## 2022-08-08 RX ADMIN — METRONIDAZOLE 500 MG: 500 TABLET ORAL at 10:43

## 2022-08-08 RX ADMIN — PRAZOSIN HYDROCHLORIDE 1 MG: 1 CAPSULE ORAL at 02:10

## 2022-08-08 RX ADMIN — POTASSIUM CHLORIDE 20 MEQ: 750 TABLET, EXTENDED RELEASE ORAL at 10:43

## 2022-08-08 RX ADMIN — HYDROMORPHONE HYDROCHLORIDE 2 MG: 2 TABLET ORAL at 00:27

## 2022-08-08 RX ADMIN — WITCH HAZEL: 500 SOLUTION RECTAL; TOPICAL at 21:08

## 2022-08-08 RX ADMIN — Medication 1 TABLET: at 10:43

## 2022-08-08 RX ADMIN — PREGABALIN 200 MG: 100 CAPSULE ORAL at 22:07

## 2022-08-08 RX ADMIN — VENLAFAXINE HYDROCHLORIDE 225 MG: 150 CAPSULE, EXTENDED RELEASE ORAL at 10:43

## 2022-08-08 RX ADMIN — ACETAMINOPHEN 650 MG: 325 TABLET, FILM COATED ORAL at 03:36

## 2022-08-08 RX ADMIN — PSYLLIUM HUSK 1 PACKET: 3.4 POWDER ORAL at 10:42

## 2022-08-08 RX ADMIN — SODIUM CHLORIDE, POTASSIUM CHLORIDE, SODIUM LACTATE AND CALCIUM CHLORIDE 250 ML: 600; 310; 30; 20 INJECTION, SOLUTION INTRAVENOUS at 06:28

## 2022-08-08 RX ADMIN — OLANZAPINE 10 MG: 10 TABLET, FILM COATED ORAL at 02:10

## 2022-08-08 RX ADMIN — MAGNESIUM SULFATE HEPTAHYDRATE 2 G: 40 INJECTION, SOLUTION INTRAVENOUS at 08:39

## 2022-08-08 RX ADMIN — OLANZAPINE 10 MG: 10 TABLET, FILM COATED ORAL at 21:07

## 2022-08-08 RX ADMIN — PANTOPRAZOLE SODIUM 40 MG: 40 INJECTION, POWDER, FOR SOLUTION INTRAVENOUS at 21:08

## 2022-08-08 RX ADMIN — METRONIDAZOLE 500 MG: 500 TABLET ORAL at 21:07

## 2022-08-08 RX ADMIN — POTASSIUM CHLORIDE 10 MEQ: 7.46 INJECTION, SOLUTION INTRAVENOUS at 11:15

## 2022-08-08 RX ADMIN — METHOCARBAMOL 1000 MG: 500 TABLET ORAL at 22:13

## 2022-08-08 RX ADMIN — FOLIC ACID 1 MG: 1 TABLET ORAL at 10:43

## 2022-08-08 RX ADMIN — PREGABALIN 200 MG: 100 CAPSULE ORAL at 10:43

## 2022-08-08 RX ADMIN — ANORECTAL OINTMENT: 15.7; .44; 24; 20.6 OINTMENT TOPICAL at 21:08

## 2022-08-08 RX ADMIN — PRAZOSIN HYDROCHLORIDE 1 MG: 1 CAPSULE ORAL at 22:07

## 2022-08-08 RX ADMIN — ANORECTAL OINTMENT: 15.7; .44; 24; 20.6 OINTMENT TOPICAL at 10:47

## 2022-08-08 RX ADMIN — PANTOPRAZOLE SODIUM 40 MG: 40 INJECTION, POWDER, FOR SOLUTION INTRAVENOUS at 10:43

## 2022-08-08 RX ADMIN — APIXABAN 5 MG: 5 TABLET, FILM COATED ORAL at 21:07

## 2022-08-08 RX ADMIN — HYDROMORPHONE HYDROCHLORIDE 2 MG: 2 TABLET ORAL at 04:56

## 2022-08-08 RX ADMIN — PREGABALIN 200 MG: 100 CAPSULE ORAL at 16:31

## 2022-08-08 RX ADMIN — WITCH HAZEL: 500 SOLUTION RECTAL; TOPICAL at 10:47

## 2022-08-08 RX ADMIN — WITCH HAZEL: 500 SOLUTION RECTAL; TOPICAL at 16:31

## 2022-08-08 RX ADMIN — HYDROMORPHONE HYDROCHLORIDE 2 MG: 2 TABLET ORAL at 10:43

## 2022-08-08 ASSESSMENT — ACTIVITIES OF DAILY LIVING (ADL)
ADLS_ACUITY_SCORE: 46
ADLS_ACUITY_SCORE: 46
ADLS_ACUITY_SCORE: 42
ADLS_ACUITY_SCORE: 46
ADLS_ACUITY_SCORE: 42
ADLS_ACUITY_SCORE: 46
ADLS_ACUITY_SCORE: 46

## 2022-08-08 NOTE — PLAN OF CARE
[id number]    Vital signs:  Temp: (!) 96.1  F (35.6  C) Temp src: Oral BP: 112/75 Pulse: 95   Resp: 18 SpO2: 98 % O2 Device: None (Room air) Oxygen Delivery: 2 LPM   Weight: 141.2 kg (311 lb 4.6 oz)    Activity: SBA/up ad mikel  Pain: 8-9/10 in rectum, managed with PO dilaudid and tylenol  Neuro: A&Ox4, makes needs known, uses call light appropriately   Cardiac: Denies chest pain/tightness  Respiratory: Denies SOB, sating 98% on RA  GI/: Denies n/v, voids spontaneously, no abd discomfort  Diet: Regular  Lines: PIV NS locked  Labs/imaging: Reviewed       New changes this shift: Low BP at AM vitals, 250LR bolus admin and provider updated with post infusion BP. Day RN aware      Continue with POC

## 2022-08-08 NOTE — PROGRESS NOTES
Daily Safety Note:    Patient has been educated on potential risks of choosing to leave the unit and that the responsibility for patient well-being will belong to the patient. Pt has been informed that admission to hospital is due to need for medical treatment. Education given to the patient on some of the potential risks included but are not limited to:      - lack of access to nursing intervention      - possible missed appointments with MD, therapies, tests      - possible missed medications, antibiotics, management of IV's    Patient Response: she understands and is able to be reached by her cell phone if needed. She likes to go out to smoke.    Patient notified staff prior to leaving unit: yes

## 2022-08-08 NOTE — PROVIDER NOTIFICATION
Provider Notification    Re: Patient had a medium size bloody stool. Finally waking up. Denies dizziness. Unsure if you want to repeat Hgb later today or see stool    Action: Notified Dr. Rivas at #[id number]    Response: MD aware and came to bedside, no re-check for Hgb

## 2022-08-08 NOTE — PLAN OF CARE
"[id number]    Patient was hard to arouse this AM, quickly falling back to sleep with conversation. Team rounded and patient briefly held conversation. Patient ultimately woke up around 1100 d/t a large urine incontinence. She did recall talking to the team and someone \"shouting\" at her. Hilaria she has been having a hard time falling asleep at night.     Had a medium size stool with a small pool of blood noted as well. Writer helped patient get cleaned up, and noticed that she was using wash cloths to wipe with. Assessed rectum area and small bleeding area was noted on the right side. Educated on the importance of using soft wipes and garfield lotion to clean to prevent further injury. Applied cream and tucks pads x1, patient declined further scheduled intervention. PO Dilaudid provided x1 for 10/10 rectal pain w/ some improvement.    BP soft, asymptomatic. K-3.3 and Mg- 1.5, replaced via IV, rechecks pending. Denies nausea and SOB. BG -130. Bladder scanned for 29 ml, patient has not voided since earlier incontinence. InterDry applied to abdominal and groin folds, slightly moist. Pitting BLE edema, lymph wraps applied. Continue with POC.       "

## 2022-08-08 NOTE — PLAN OF CARE
[id number]  AVSS, alert and oriented x 4, denies nausea/sob. Pain located at rectal and is 8/10, given PRN po dilaudid x 2, given PRN robaxin. Up independent to bedside commode, voided good, 1 BM with little blood in stool, Bladder scan is 16 ml after she void. Pt ambulate in the [address] way couple times.BG @ 1700 is 116, no sliding scale needed. BG @ 2200 is 103, no sliding scale needed. Applied inter dry between groin, and abdominal folds.  Continue to monitor care.

## 2022-08-08 NOTE — PROVIDER NOTIFICATION
"Paged: Dr Radha Barnhart [phone]    \"Pt wondering if PO dilaudid range can be increased? Upper limit dose currently not effective for pain per patient. Pre med rating 8/10, post med rating 8/10. Thanks!\"    Would like patient to utilize tylenol before exploring narc dose increase  "

## 2022-08-08 NOTE — PROVIDER NOTIFICATION
"Radha Mendoza MD @ #[id number]: \"FYI pt having low BPs. 83/49 & 89/58. HR 91. Asymptomatic when laying.\"     250 LR Bolus ordered at 500ml/hr    \"Pt's post bolus BP 99/64\"  "

## 2022-08-08 NOTE — PROGRESS NOTES
Patient was called for her visit as scheduled.Her appt was no cancelled.  Patient stated she has been in the hospital. She has another psychotherapy appt on 8/22/2022.

## 2022-08-08 NOTE — PROGRESS NOTES
"SPIRITUAL HEALTH SERVICES  [facility] ([place]) 7D  REFERRAL SOURCE: Length of Stay     Pt was eating a meal and visited with  for a bit sharing that she has 2 boys (6 and 13) who are staying with family in [place], \"so its a good time for me to be sick.\" She shares \"as soon as one thing gets fixed something else opens up, but that's ok, everyone is so nice, nobody's mistreating me.\" Pt became sleepy and agreed to meet again with me, adding \"next time you come we can pray.\" She then states that she is Quaker.      PLAN: Will follow-up with pt in a few day.s     Rev. Alvina Rock MDiv, [facility]  Staff    Pager [phone]  * [facility] remains available 24/7 for emergent requests/referrals, either by having the switchboard page the on-call  or by entering an ASAP/STAT consult in Epic (this will also page the on-call ).*       "

## 2022-08-08 NOTE — PROGRESS NOTES
[facility]    Progress Note - Medicine Service, [facility] TEAM 1       Date of Admission:  7/26/2022    Assessment & Plan        Hilaria Bonner is a 31 year old female admitted on 7/26/2022. She has a history of HTN, T2DM, alcohol use, and prior PE c/b subsequent PEA arrest on Xarelto, fatty liver disease and anxiety who is admitted for hypotensive shock and anemia likely secondary to hemorrhoidal bleeding.     Changes today:  - BP dropped to 89/58 overnight--> 99/64 after 250 ml LR bolus   - IVC equivocal on bedside US. Hold lasix this AM given soft Bps  - Urinary incontinence x2 --> UA ordered  - Lymphedema wraps to legs   ----------------------------------------------------------------------------------------------------    #Rectal bleeding s/p mixed hemorrhoidectomy   #Hypovolemic shock 2/2 blood loss vs. Dehydration, improving  P/w 3 weeks of hematochezia with associated lightheadedness and fatigue while on Xarelto. Hypotensive with improvement after repeat LR boluses. Baseline hgb 9-10. On arrival, Hgb 6.4, improved to 7.8 after 2 U pRBC. CTA abd/pelvis negative for active extravasation. Infectious stool panel negative. EGD/Colonoscopy performed 7/28 without evidence of acute bleed but presence of hemorrhoids. Hgb continued to wax and wane between 7-9 w/ ongoing bloody stools. Hemorrhoidectomy performed 8/3 with improvement in rectal bleeding. Bps soft this AM but improved with IVF. IVC size equivocal on bedside US 8/8.   -Colorectal surgery following:   -Hemorrhoidectomy performed 8/3--went well   -Miralax added for soft stools. Continue metamucil.    -Continue sitz baths 3-4x daily and tuck 4x4 gauze    -Restarted apixaban 8/4  -Dilaudid 4 mg IV q4h for pain relief   -Encouraged her to not use wash cloths on rectum as this may be aggravating her sutures and causing her to bleed. Given gentle wipes   -BP stable after LR bolus this AM  -Lasix held   -AM CBC  checks    #Chronic macrocytic anemia - stable   #Reticulocytosis   Baseline Hgb ~9-10 since 2019 (appears to be normal years prior) w/ MCV ~100. Likely 2/2 alcohol use disorder vs drug induced macrocytosis (is on chronic folate/B12 supplementation and both folate/B12 levels have been normal). Hgb 6.4 on arrival w/ elevated reticulocyte count consistent with anemia from hemorrhoidal bleeding. Hgb dropped to 6.8-->6.9 on 8/5 (< 48 hours s/p hemorrhoidectomy). Received 1 unit pRBCs. Afternoon check of hgb 8/6 up to 10.1--suspect lab error as back to 7-range today.   -Repeat CBC in AM    #. Urinary incontinence   #. Acute urinary retention, resolving  400 ml of urine retention post-hemorrhoidectomy, likely 2/2 inflammation/swelling from procedure vs prazosin being held 2/2 hypotension. Indwelling dougherty placed by urology-> now removed. Now struggling with urinary incontinence x2.  -UA ordered   -Continue prazosin   -Continue to monitor urine output w/ bladder scans PRN (per protocol)     #. Acute on chronic sinus tachycardia--stable  Resting -115 with intermittent acceleration with ambulation. Says that this has been normal for her since her PE. Pt denies SOB, chest pain, or palpitations.  Sinus rhythm on cardiac monitoring x2 days--now stopped. No evidence for infection on exam and white count normal. Continues to be satting well on room air. Tachycardia improved to low 100's after IV hydration. Suspect tachycardia 2/2 dehydration in the setting of ARAMIS, hypotension, and lactic acidosis vs an autonomic dysfunction.   -Will continue to monitor vitals     #. Mouth pain   C/o tongue pain over the past few days. No relief with magic mouthwash. Also has cracking/pain on corners of mouth. Could be due to dry air vs vitamin deficiency vs medication induced  -B2 level pending  -Biotene mouth wash 4x daily PRN  -Vaseline for lips/corners of mouth     #. Extremity Pains  #. Pitting BLE edema   #. Hypomagnesia   Describes  vague aches and pains in her arms and legs. Different from her neuropathy. CK normal. BLE US negative 8/1.   - Continue magnesium replacement protocol   - Lasix spot-dosing right now for goal 1-2L net neg daily   - Lymphedema wraps to bilateral LEs    #ARAMIS - resolved  Pt presented with creatinine elevated to 2.18 on admission with baseline at 0.7-0.8. Likely due to prerenal hypotension in setting of rectal bleed vs. dehydration. Improved with pRBC and fluid resuscitation.     #Lactic acidosis, resolved  Bicarb improved with volume resuscitation. Suspect contribution of elevated lactate (6/7 on admit, now down to 2.2), hypotension (resolving) and anemia (transfused).     #. Trichomonas Vaginalis   Called by PCP 8/3 to report that pt's recent PAP smear came back positive for trichomonas. Pt denies any sxs.   -Continue Flagyl 500 mg BID x 7 day course    Chronic Medical Problems  #Hx of PE c/b PEA arrest in 2018  -PTA Xarelto held on arrival 2/2 concern for GI bleed. Started on apixaban 7/28 after bleed not found on EGD/C. May hold apixaban PM if Hgb continues to decline at recheck 7/30   -ECHO showed normal global and regional left ventricular function with EF of 55-60%. No changes compared to previous study.  -Will continue to monitor     #Chronic peripheral neuropathy  -PTA Pregabalin     #Type 2 diabetes  A1c on admission 5.4. Glucose in low 100's since admission   -Hold PTA metformin  -monitor on BMP daily, will increase frequency if needed    #Chronic anxiety  #Nightmares  -Continue PTA Venlafaxine  -restarted Zyprexa   -restarted prazosin    #Obstructive Sleep Apnea  -CPAP at night    #Chronic hypertension  -Hold PTA Lisinopril and hydrochlorothiazide while normotensive and intermittently hypotensive.     #R foot injury   Tripped and fell from standing height on 6/29, inverting her R ankle. Seen at [facility] in [place] 6/30. R ankle XRs negative; R foot XRs with ? Calcaneal fracture at the calcaneal cuboid joint. Given  CAM boot, which she has been wearing since. Continued to have R foot pain on admission. Repeat R foot XR without calcaneal fracture but there are ossific fragment projections over the proximal second metatarsal. No further workup or intervention recommended, per Ortho.   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 Appears pt has been following with neurology. Sx onset April 2020 4 weeks post-covid. NCS in 7/20 showed absent sensory responses in the upper and lower limbs and normal motor responses. Has been on maintenance IVIG q3 weeks (recently decreased to [id number]) since then and sx managed well with this per last neuro note 5/9/22.  -IVIG infusion given 8/4    #Hx of alcohol abuse  #Hx of alcohol induced pancreatitis   Used to drink 1L vodka daily. Hospitalized Feb. 2021 for alcohol induced pancreatitis and has been sober since. Hepatosteatosis noted on CTAP 7/26/22.       Diet: Regular Diet Adult    DVT Prophylaxis: apixaban 5 mg BID  Gonzales Catheter: Not present  Fluids: LR fluid bolus  Central Lines: None  Cardiac Monitoring: None  Code Status: Full Code      The patient's care was discussed with the Attending Physician, Dr. Caceres and Patient.    Jordyn Rivas, DO  Medicine Service, [facility] TEAM [address] [place] [address]  Securely message with the Vocera Web Console (learn more here)  Text page via [facility] Paging/Directory   Please see signed in provider for up to date coverage information      Clinically Significant Risk Factors Present on Admission                     ______________________________________________________________________    Interval History   Requested more pain control overnight   Blood in toilet after BM this AM. RN did rectal cares and noted a small area of active bleeding near one suture. Pt also admitted to using wash cloths to wipe  area   BP 89/58--> 99/64 w/ 250 ml LR bolus     4 pt ROS otherwise negative.     Data reviewed today: I reviewed all medications, new labs and imaging results over the last 24 hours.    Physical Exam   Vital Signs: Temp: 96.9  F (36.1  C) Temp src: Oral BP: 99/64 Pulse: 91   Resp: 20 SpO2: 97 % O2 Device: None (Room air)    Weight: 311 lbs 4.63 oz  General Appearance: No acute distress   Respiratory: clear lung sounds, no increased work of breathing  Cardiovascular: regular rate and rhythm, no murmur   GI: Non distended. Soft, non tender  Extremities: 2+ pitting edema in extremities   Other: Alert and awake     Data   Recent Labs   Lab 08/08/22  0657 08/08/22  0205 08/07/22  2219 08/07/22  0858 08/07/22  0708 08/06/22  1822 08/06/22  1307 08/06/22  0758 08/06/22  0651 08/05/22  0847 08/05/22  0822 08/03/22  0632 08/03/22  0629 08/02/22  1926 08/02/22  1543 08/02/22  0933 08/02/22  0719   WBC 8.2  --   --   --  7.9  --   --   --  8.1  --  7.6  --  10.3  --   --   --  9.8   HGB 7.4*  --   --   --  7.6*  --  10.1*  --  7.2*   < > 6.8*   < > 7.7*  --   --    < > 7.2*   *  --   --   --  112*  --   --   --  110*  --  112*  --  108*  --   --   --  107*     --   --   --  230  --   --   --  167  --  227  --  239  --   --   --  237   INR  --   --   --   --   --   --   --   --   --   --   --   --  1.15  --  1.13  --   --      --   --   --  140  --   --   --   --   --  137   < > 138  --   --   --  138   POTASSIUM 3.2*  --   --   --  3.6  --   --   --   --   --  3.7   < > 3.5  --   --   --  3.7   CHLORIDE 109*  --   --   --  111*  --   --   --   --   --  106   < > 106  --   --   --  108*   CO2 21*  --   --   --  21*  --   --   --   --   --  20*   < > 21*  --   --   --  20*   BUN 4.3*  --   --   --  4.4*  --   --   --   --   --  3.9*   < > 3.6*  --   --   --  6.3   CR 0.82  --   --   --  0.88  --   --   --   --   --  0.92   < > 0.87  --   --   --  1.09*   ANIONGAP 9  --   --   --  8  --   --   --   --   --  11    < > 11  --   --   --  10   QUINCY 8.3*  --   --   --  8.3*  --   --   --   --   --  7.7*   < > 7.1*  --   --   --  7.5*   * 120* 103*   < > 110*   < >  --    < >  --    < > 138*   < > 104*   < >  --    < > 124*   ALBUMIN  --   --   --   --   --   --   --   --   --   --   --   --   --   --   --   --  2.3*   PROTTOTAL  --   --   --   --   --   --   --   --   --   --   --   --   --   --   --   --  5.1*   BILITOTAL  --   --   --   --   --   --   --   --   --   --   --   --   --   --   --   --  2.1*   ALKPHOS  --   --   --   --   --   --   --   --   --   --   --   --   --   --   --   --  218*   ALT  --   --   --   --   --   --   --   --   --   --   --   --   --   --   --   --  102*   AST  --   --   --   --   --   --   --   --   --   --   --   --   --   --   --   --  189*    < > = values in this interval not displayed.

## 2022-08-09 ENCOUNTER — APPOINTMENT (OUTPATIENT)
Dept: PHYSICAL THERAPY | Facility: CLINIC | Age: 32
End: 2022-08-09
Payer: COMMERCIAL

## 2022-08-09 ENCOUNTER — APPOINTMENT (OUTPATIENT)
Dept: OCCUPATIONAL THERAPY | Facility: CLINIC | Age: 32
End: 2022-08-09
Payer: COMMERCIAL

## 2022-08-09 LAB
ALBUMIN UR-MCNC: 10 MG/DL
ANION GAP SERPL CALCULATED.3IONS-SCNC: 8 MMOL/L (ref 7–15)
APPEARANCE UR: ABNORMAL
BACTERIA #/AREA URNS HPF: ABNORMAL /HPF
BILIRUB UR QL STRIP: NEGATIVE
BUN SERPL-MCNC: 3.2 MG/DL (ref 6–20)
CALCIUM SERPL-MCNC: 8.4 MG/DL (ref 8.6–10)
CHLORIDE SERPL-SCNC: 110 MMOL/L (ref 98–107)
COLOR UR AUTO: YELLOW
CREAT SERPL-MCNC: 0.78 MG/DL (ref 0.51–0.95)
DEPRECATED HCO3 PLAS-SCNC: 23 MMOL/L (ref 22–29)
ERYTHROCYTE [DISTWIDTH] IN BLOOD BY AUTOMATED COUNT: 21.1 % (ref 10–15)
GFR SERPL CREATININE-BSD FRML MDRD: >90 ML/MIN/1.73M2
GLUCOSE BLDC GLUCOMTR-MCNC: 127 MG/DL (ref 70–99)
GLUCOSE BLDC GLUCOMTR-MCNC: 139 MG/DL (ref 70–99)
GLUCOSE BLDC GLUCOMTR-MCNC: 162 MG/DL (ref 70–99)
GLUCOSE BLDC GLUCOMTR-MCNC: 206 MG/DL (ref 70–99)
GLUCOSE BLDC GLUCOMTR-MCNC: 94 MG/DL (ref 70–99)
GLUCOSE SERPL-MCNC: 123 MG/DL (ref 70–99)
GLUCOSE UR STRIP-MCNC: NEGATIVE MG/DL
HCT VFR BLD AUTO: 28.2 % (ref 35–47)
HGB BLD-MCNC: 8.7 G/DL (ref 11.7–15.7)
HGB UR QL STRIP: ABNORMAL
KETONES UR STRIP-MCNC: NEGATIVE MG/DL
LEUKOCYTE ESTERASE UR QL STRIP: ABNORMAL
MCH RBC QN AUTO: 34.5 PG (ref 26.5–33)
MCHC RBC AUTO-ENTMCNC: 30.9 G/DL (ref 31.5–36.5)
MCV RBC AUTO: 112 FL (ref 78–100)
NITRATE UR QL: NEGATIVE
PH UR STRIP: 6 [PH] (ref 5–7)
PLATELET # BLD AUTO: 253 10E3/UL (ref 150–450)
POTASSIUM SERPL-SCNC: 3.9 MMOL/L (ref 3.4–5.3)
RBC # BLD AUTO: 2.52 10E6/UL (ref 3.8–5.2)
RBC URINE: 2 /HPF
SODIUM SERPL-SCNC: 141 MMOL/L (ref 136–145)
SP GR UR STRIP: 1.01 (ref 1–1.03)
SQUAMOUS EPITHELIAL: 3 /HPF
UROBILINOGEN UR STRIP-MCNC: NORMAL MG/DL
VIT B2 SERPL-MCNC: 8 MCG/L (ref 1–19)
WBC # BLD AUTO: 11.5 10E3/UL (ref 4–11)
WBC URINE: >182 /HPF

## 2022-08-09 PROCEDURE — 999N000157 HC STATISTIC RCP TIME EA 10 MIN

## 2022-08-09 PROCEDURE — 97140 MANUAL THERAPY 1/> REGIONS: CPT | Mod: GO | Performed by: OCCUPATIONAL THERAPIST

## 2022-08-09 PROCEDURE — 82310 ASSAY OF CALCIUM: CPT

## 2022-08-09 PROCEDURE — 87086 URINE CULTURE/COLONY COUNT: CPT

## 2022-08-09 PROCEDURE — 97530 THERAPEUTIC ACTIVITIES: CPT | Mod: GP

## 2022-08-09 PROCEDURE — 250N000013 HC RX MED GY IP 250 OP 250 PS 637: Performed by: STUDENT IN AN ORGANIZED HEALTH CARE EDUCATION/TRAINING PROGRAM

## 2022-08-09 PROCEDURE — 82570 ASSAY OF URINE CREATININE: CPT

## 2022-08-09 PROCEDURE — U0005 INFEC AGEN DETEC AMPLI PROBE: HCPCS | Performed by: STUDENT IN AN ORGANIZED HEALTH CARE EDUCATION/TRAINING PROGRAM

## 2022-08-09 PROCEDURE — 81001 URINALYSIS AUTO W/SCOPE: CPT

## 2022-08-09 PROCEDURE — 250N000013 HC RX MED GY IP 250 OP 250 PS 637

## 2022-08-09 PROCEDURE — 97110 THERAPEUTIC EXERCISES: CPT | Mod: GO | Performed by: OCCUPATIONAL THERAPIST

## 2022-08-09 PROCEDURE — 97116 GAIT TRAINING THERAPY: CPT | Mod: GP

## 2022-08-09 PROCEDURE — 99232 SBSQ HOSP IP/OBS MODERATE 35: CPT | Mod: GC | Performed by: STUDENT IN AN ORGANIZED HEALTH CARE EDUCATION/TRAINING PROGRAM

## 2022-08-09 PROCEDURE — 85027 COMPLETE CBC AUTOMATED: CPT

## 2022-08-09 PROCEDURE — 94660 CPAP INITIATION&MGMT: CPT

## 2022-08-09 PROCEDURE — 250N000013 HC RX MED GY IP 250 OP 250 PS 637: Performed by: COLON & RECTAL SURGERY

## 2022-08-09 PROCEDURE — C9113 INJ PANTOPRAZOLE SODIUM, VIA: HCPCS

## 2022-08-09 PROCEDURE — 250N000011 HC RX IP 250 OP 636

## 2022-08-09 PROCEDURE — 120N000002 HC R&B MED SURG/OB UMMC

## 2022-08-09 PROCEDURE — 97530 THERAPEUTIC ACTIVITIES: CPT | Mod: GO | Performed by: OCCUPATIONAL THERAPIST

## 2022-08-09 PROCEDURE — 36415 COLL VENOUS BLD VENIPUNCTURE: CPT

## 2022-08-09 PROCEDURE — 97535 SELF CARE MNGMENT TRAINING: CPT | Mod: GO | Performed by: OCCUPATIONAL THERAPIST

## 2022-08-09 RX ORDER — PANTOPRAZOLE SODIUM 40 MG/1
40 TABLET, DELAYED RELEASE ORAL
Status: DISCONTINUED | OUTPATIENT
Start: 2022-08-09 | End: 2022-08-11 | Stop reason: HOSPADM

## 2022-08-09 RX ADMIN — ANORECTAL OINTMENT: 15.7; .44; 24; 20.6 OINTMENT TOPICAL at 19:56

## 2022-08-09 RX ADMIN — PREGABALIN 200 MG: 100 CAPSULE ORAL at 14:50

## 2022-08-09 RX ADMIN — FOLIC ACID 1 MG: 1 TABLET ORAL at 09:40

## 2022-08-09 RX ADMIN — METRONIDAZOLE 500 MG: 500 TABLET ORAL at 19:55

## 2022-08-09 RX ADMIN — WITCH HAZEL: 500 SOLUTION RECTAL; TOPICAL at 09:42

## 2022-08-09 RX ADMIN — PANTOPRAZOLE SODIUM 40 MG: 40 INJECTION, POWDER, FOR SOLUTION INTRAVENOUS at 09:41

## 2022-08-09 RX ADMIN — Medication 1 TABLET: at 09:40

## 2022-08-09 RX ADMIN — VENLAFAXINE HYDROCHLORIDE 225 MG: 150 CAPSULE, EXTENDED RELEASE ORAL at 09:39

## 2022-08-09 RX ADMIN — HYDROMORPHONE HYDROCHLORIDE 4 MG: 2 TABLET ORAL at 09:38

## 2022-08-09 RX ADMIN — ANORECTAL OINTMENT: 15.7; .44; 24; 20.6 OINTMENT TOPICAL at 14:37

## 2022-08-09 RX ADMIN — METHOCARBAMOL 1000 MG: 500 TABLET ORAL at 17:09

## 2022-08-09 RX ADMIN — PRAZOSIN HYDROCHLORIDE 1 MG: 1 CAPSULE ORAL at 22:21

## 2022-08-09 RX ADMIN — PSYLLIUM HUSK 1 PACKET: 3.4 POWDER ORAL at 09:40

## 2022-08-09 RX ADMIN — ANORECTAL OINTMENT: 15.7; .44; 24; 20.6 OINTMENT TOPICAL at 09:42

## 2022-08-09 RX ADMIN — POTASSIUM CHLORIDE 20 MEQ: 750 TABLET, EXTENDED RELEASE ORAL at 09:40

## 2022-08-09 RX ADMIN — APIXABAN 5 MG: 5 TABLET, FILM COATED ORAL at 19:55

## 2022-08-09 RX ADMIN — HYDROMORPHONE HYDROCHLORIDE 4 MG: 2 TABLET ORAL at 14:50

## 2022-08-09 RX ADMIN — METRONIDAZOLE 500 MG: 500 TABLET ORAL at 09:40

## 2022-08-09 RX ADMIN — PREGABALIN 200 MG: 100 CAPSULE ORAL at 19:55

## 2022-08-09 RX ADMIN — HYDROMORPHONE HYDROCHLORIDE 4 MG: 2 TABLET ORAL at 20:42

## 2022-08-09 RX ADMIN — APIXABAN 5 MG: 5 TABLET, FILM COATED ORAL at 09:39

## 2022-08-09 RX ADMIN — NICOTINE 1 CARTRIDGE: 4 INHALANT RESPIRATORY (INHALATION) at 22:21

## 2022-08-09 RX ADMIN — PANTOPRAZOLE SODIUM 40 MG: 40 TABLET, DELAYED RELEASE ORAL at 17:09

## 2022-08-09 RX ADMIN — PREGABALIN 200 MG: 100 CAPSULE ORAL at 09:39

## 2022-08-09 RX ADMIN — OLANZAPINE 10 MG: 10 TABLET, FILM COATED ORAL at 22:21

## 2022-08-09 RX ADMIN — POLYETHYLENE GLYCOL 3350 17 G: 17 POWDER, FOR SOLUTION ORAL at 09:39

## 2022-08-09 RX ADMIN — METHOCARBAMOL 1000 MG: 500 TABLET ORAL at 09:38

## 2022-08-09 ASSESSMENT — ACTIVITIES OF DAILY LIVING (ADL)
ADLS_ACUITY_SCORE: 48
ADLS_ACUITY_SCORE: 46
ADLS_ACUITY_SCORE: 46
ADLS_ACUITY_SCORE: 48
ADLS_ACUITY_SCORE: 46
ADLS_ACUITY_SCORE: 48
ADLS_ACUITY_SCORE: 46
ADLS_ACUITY_SCORE: 46

## 2022-08-09 NOTE — PROGRESS NOTES
[facility]    Progress Note - Medicine Service, MAROON TEAM 1       Date of Admission:  7/26/2022    Assessment & Plan        Hilaria Bonner is a 31 year old female admitted on 7/26/2022. She has a history of HTN, T2DM, alcohol use, and prior PE c/b subsequent PEA arrest on Xarelto, fatty liver disease and anxiety who is admitted for hypotensive shock and anemia likely secondary to hemorrhoidal bleeding.     Changes today:  -Patient urinating improved, no episodes of incontinence documented  -Patient denying pain   -Continuing to hold lasix, BP remain low  -Last day Flagyl    ----------------------------------------------------------------------------------------------------    #Rectal bleeding s/p mixed hemorrhoidectomy   #Hypovolemic shock 2/2 blood loss vs. Dehydration, improving  P/w 3 weeks of hematochezia with associated lightheadedness and fatigue while on Xarelto. Hypotensive with improvement after repeat LR boluses. Baseline hgb 9-10. On arrival, Hgb 6.4, improved to 7.8 after 2 U pRBC. CTA abd/pelvis negative for active extravasation. Infectious stool panel negative. EGD/Colonoscopy performed 7/28 without evidence of acute bleed but presence of hemorrhoids. Hgb continued to wax and wane between 7-9 w/ ongoing bloody stools. Hemorrhoidectomy performed 8/3 with improvement in rectal bleeding. Bps soft this AM but improved with IVF. IVC size equivocal on bedside US 8/8.   -Colorectal surgery following:   -Hemorrhoidectomy performed 8/3--went well   -Miralax added for soft stools. Continue metamucil.    -Continue sitz baths 3-4x daily and tuck 4x4 gauze    -Restarted apixaban 8/4  -Dilaudid 4 mg IV q4h for pain relief   -Encouraged her to not use wash cloths on rectum as this may be aggravating her sutures and causing her to bleed. Given gentle wipes   -BP stable   -Lasix held   -AM CBC checks    #Chronic macrocytic anemia - stable   #Reticulocytosis   Baseline  Hgb ~9-10 since 2019 (appears to be normal years prior) w/ MCV ~100. Likely 2/2 alcohol use disorder vs drug induced macrocytosis (is on chronic folate/B12 supplementation and both folate/B12 levels have been normal). Hgb 6.4 on arrival w/ elevated reticulocyte count consistent with anemia from hemorrhoidal bleeding. Hgb dropped to 6.8-->6.9 on 8/5 (< 48 hours s/p hemorrhoidectomy). Received 1 unit pRBCs. Afternoon check of hgb 8/6 up to 10.1--suspect lab error as back to 7-range today.   -Repeat CBC in AM    #. Urinary incontinence   #. Acute urinary retention, resolving  400 ml of urine retention post-hemorrhoidectomy, likely 2/2 inflammation/swelling from procedure vs prazosin being held 2/2 hypotension. Indwelling dougherty placed by urology-> now removed. Now struggling with urinary incontinence x2.  -UA ordered   -Continue prazosin   -Continue to monitor urine output w/ bladder scans PRN (per protocol)     #. Acute on chronic sinus tachycardia--stable  Resting -115 with intermittent acceleration with ambulation. Says that this has been normal for her since her PE. Pt denies SOB, chest pain, or palpitations.  Sinus rhythm on cardiac monitoring x2 days--now stopped. No evidence for infection on exam and white count normal. Continues to be satting well on room air. Tachycardia improved to low 100's after IV hydration. Suspect tachycardia 2/2 dehydration in the setting of ARAMIS, hypotension, and lactic acidosis vs an autonomic dysfunction.   -Will continue to monitor vitals     #. Mouth pain   C/o tongue pain over the past few days. No relief with magic mouthwash. Also has cracking/pain on corners of mouth. Could be due to dry air vs vitamin deficiency vs medication induced  -B2 level pending  -Biotene mouth wash 4x daily PRN  -Vaseline for lips/corners of mouth     #. Extremity Pains  #. Pitting BLE edema   #. Hypomagnesia   Describes vague aches and pains in her arms and legs. Different from her neuropathy. CK  normal. BLE US negative 8/1.   - Continue magnesium replacement protocol   - Lasix spot-dosing right now for goal 1-2L net neg daily   - Lymphedema wraps to bilateral LEs    #ARAMIS - resolved  Pt presented with creatinine elevated to 2.18 on admission with baseline at 0.7-0.8. Likely due to prerenal hypotension in setting of rectal bleed vs. dehydration. Improved with pRBC and fluid resuscitation.     #Lactic acidosis, resolved  Bicarb improved with volume resuscitation. Suspect contribution of elevated lactate (6/7 on admit, now down to 2.2), hypotension (resolving) and anemia (transfused).     #. Trichomonas Vaginalis- treatment completed 8/9  Called by PCP 8/3 to report that pt's recent PAP smear came back positive for trichomonas. Pt denies any sxs.   -Continue Flagyl 500 mg BID x 7 day course    Chronic Medical Problems  #Hx of PE c/b PEA arrest in 2018  -PTA Xarelto held on arrival 2/2 concern for GI bleed. Started on apixaban 7/28 after bleed not found on EGD/C. May hold apixaban PM if Hgb continues to decline at recheck 7/30   -ECHO showed normal global and regional left ventricular function with EF of 55-60%. No changes compared to previous study.  -Will continue to monitor     #Chronic peripheral neuropathy  -PTA Pregabalin     #Type 2 diabetes  A1c on admission 5.4. Glucose in low 100's since admission   -Hold PTA metformin  -monitor on BMP daily, will increase frequency if needed    #Chronic anxiety  #Nightmares  -Continue PTA Venlafaxine  -restarted Zyprexa   -restarted prazosin    #Obstructive Sleep Apnea  -CPAP at night    #Chronic hypertension  -Hold PTA Lisinopril and hydrochlorothiazide while normotensive and intermittently hypotensive.     #R foot injury   Tripped and fell from standing height on 6/29, inverting her R ankle. Seen at [facility] in [place] 6/30. R ankle XRs negative; R foot XRs with ? Calcaneal fracture at the calcaneal cuboid joint. Given CAM boot, which she has been wearing since. Continued  to have R foot pain on admission. Repeat R foot XR without calcaneal fracture but there are ossific fragment projections over the proximal second metatarsal. No further workup or intervention recommended, per Ortho.   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 Appears pt has been following with neurology. Sx onset April 2020 4 weeks post-covid. NCS in 7/20 showed absent sensory responses in the upper and lower limbs and normal motor responses. Has been on maintenance IVIG q3 weeks (recently decreased to [id number]) since then and sx managed well with this per last neuro note 5/9/22.  -IVIG infusion given 8/4    #Hx of alcohol abuse  #Hx of alcohol induced pancreatitis   Used to drink 1L vodka daily. Hospitalized Feb. 2021 for alcohol induced pancreatitis and has been sober since. Hepatosteatosis noted on CTAP 7/26/22.       Diet: Regular Diet Adult    DVT Prophylaxis: apixaban 5 mg BID  Gonzales Catheter: Not present  Fluids: LR fluid bolus  Central Lines: None  Cardiac Monitoring: None  Code Status: Full Code      The patient's care was discussed with the Attending Physician, Dr. Florence.    Arabella Owen MD  Medicine Service, [facility] TEAM [address] [place] [address]  Securely message with the Vocera Web Console (learn more here)  Text page via [facility] Paging/Directory   Please see signed in provider for up to date coverage information      Clinically Significant Risk Factors Present on Admission                     ______________________________________________________________________    Interval History   No interval changes overnight.  Patient lethargic this morning. Denying pain. Per notes and chart, no additional episodes of incontinence. Patient denying urinary urgency, dysuria, hematuria. Patient denying pain.     Data reviewed today: I reviewed all  medications, new labs and imaging results over the last 24 hours.    Physical Exam   Vital Signs: Temp: 97.4  F (36.3  C) Temp src: Oral BP: 103/54 Pulse: 109   Resp: 16 SpO2: 96 % O2 Device: None (Room air)    Weight: 309 lbs 12.8 oz  General Appearance: No acute distress   Respiratory: clear lung sounds, no increased work of breathing  Cardiovascular: regular rate and rhythm, no murmur   GI: Non distended. Soft, non tender  Extremities: 2+ pitting edema in extremities   Other: Alert and awake     Data   Recent Labs   Lab 08/09/22  1047 08/09/22  1005 08/09/22  0249 08/08/22  1900 08/08/22  1552 08/08/22  1231 08/08/22  0657 08/07/22  0858 08/07/22  0708 08/03/22  0632 08/03/22  0629 08/02/22  1926 08/02/22  1543   WBC 11.5*  --   --   --   --   --  8.2  --  7.9   < > 10.3  --   --    HGB 8.7*  --   --   --   --   --  7.4*  --  7.6*   < > 7.7*  --   --    *  --   --   --   --   --  114*  --  112*   < > 108*  --   --      --   --   --   --   --  200  --  230   < > 239  --   --    INR  --   --   --   --   --   --   --   --   --   --  1.15  --  1.13     --   --   --   --   --  139  --  140   < > 138  --   --    POTASSIUM 3.9  --   --   --  3.9  --  3.2*  --  3.6   < > 3.5  --   --    CHLORIDE 110*  --   --   --   --   --  109*  --  111*   < > 106  --   --    CO2 23  --   --   --   --   --  21*  --  21*   < > 21*  --   --    BUN 3.2*  --   --   --   --   --  4.3*  --  4.4*   < > 3.6*  --   --    CR 0.78  --   --   --   --   --  0.82  --  0.88   < > 0.87  --   --    ANIONGAP 8  --   --   --   --   --  9  --  8   < > 11  --   --    QUINCY 8.4*  --   --   --   --   --  8.3*  --  8.3*   < > 7.1*  --   --    * 127* 206*   < >  --    < > 116*   < > 110*   < > 104*   < >  --     < > = values in this interval not displayed.

## 2022-08-09 NOTE — TELEPHONE ENCOUNTER
Received a refill request for olanzapine. Patient is currently hospitalized. RN reviewed last refill with pharmacy and amount - last filled #30 on 7/12/22. Routing to provider to refill as there are no more fills on file.    Date of Last Office Visit: 2/2/22  Date of Next Office Visit: 9/7/2022  No shows since last visit: 0  Cancellations since last visit: 0    Medication requested: Olanzapine Date last ordered: 2/2/22 Qty: 30 Refills: 3      Review of MN ?: Not a delegate for provider    Lapse in medication adherence greater than 5 days?: No (Last refill 7/12/22)    Medication refill request verified as identical to current order?: Yes    Result of Last DAM, VPA, Li+ Level, CBC, or Carbamazepine Level (at or since last visit): Review current labs while in hospital    Last visit treatment plan:   HPI:  The patient was interviewed today and told me she was doing all right.  She stated the voices have been absent but they have been back now for about a month.  She denied having any command hallucinations.  She denied having any thoughts of harming herself or anybody else and stated she was able to contract for safety.  She stated the voices were more or less the conversation and they were not there very often.  She admitted being a little bit depressed and at times felt hopeless and helpless and worthless.  She stated her concentration was variable.  She stated her energy was down.     She states she continues to have elevated blood sugars and her medical team is addressing that.  Apparently they had wanted to increase her Metformin but the patient decided to decrease it on her own.  She continues to follow-up with the medical team and I suggested she talk with them about any concerns she might be having with her about diabetic medication.  She also reported to me she did not have an individual therapist and would be willing to start that.  She is able to contract for safety denies having any other new medical  concerns or complaints and no new allergies.    Plan:  I am going to increase the patient's Effexor XR to 225 mg a day.  Risks and benefits were discussed.  The patient will continue to follow with her medical team regarding her diabetes.     []Medication refilled per  Medication Refill in Ambulatory Care  policy.  [x]Medication unable to be refilled by RN due to criteria not met as indicated below:    []Eligibility - not seen in the last year   []Supervision - no future appointment   []Compliance - no shows, cancellations or lapse in therapy   []Verification - order discrepancy   []Controlled medication   [x]Medication not included in policy   []90-day supply request   []Other

## 2022-08-09 NOTE — PROGRESS NOTES
08/08/22 1600   Quick Adds   Quick Adds Edema   General Information   Onset of Illness/Injury or Date of Surgery 08/08/22   Referring Physician Jordyn Rivas MD   Edema General Information   Onset of Edema 07/26/22   Affected Body Part(s) Left LE;Right LE   Edema Etiology Surgery  (Low Albumin, liver disease, decreased mobility)   Edema Examination/Assessment   Skin Condition Pitting;Dryness   Pitting Assessment 3+   Clinical Impression   Criteria for Skilled Therapeutic Interventions Met (OT) Yes, treatment indicated   Edema: Patient Presentation Edema   Edema: Planned Interventions Gradient compression bandaging;Fit for compression garment;Edema exercises;Precautions to prevent infection/exacerbation;Education;Manual therapy   Clinical Decision Making Complexity (OT) low complexity   Risk & Benefits of therapy have been explained evaluation/treatment results reviewed   Total Evaluation Time (Minutes)   Total Evaluation Time (Minutes) 5   OT Goals   Therapy Frequency (OT) 5 times/wk   OT Predicted Duration/Target Date for Goal Attainment 08/14/22   OT Goals Edema   OT: Edema education to increase ability to manage edema after discharge from the hospital Patient;Caregiver;Verbalize;Demonstrate;[place];Skin care routine;signs/symptoms of intolerance;wear schedule;limb positioning;garrnet/bandage care;discharge recommendations   OT: Management of edema bandages Patient;Caregiver;Verbalize;Demonstrate;[place];quick wrap;garment(s)   OT: Functional edema exercise program to reduce limb volume, increase activity tolerance and improve independence with ADL Patient;Caregiver;Verbalize;Demonstrates;[place];HEP

## 2022-08-09 NOTE — PLAN OF CARE
Goal Outcome Evaluation: Ongoing progressing   Alert and oriented, able to make needs known. On room air, denies shortness of breath. Pain/discomfort to buttocks and legs. PRN dilaudid and robaxin given.  Has + 2/3 edema in legs, has compression wraps on. BG checks 127 A.M 139 at noon and 162 at dinner. Got one unit of insulin. Continue to have soft BP. PIV saline locked. Had medium brown with small blood in BM today. Incontinent of urine. Could not get urine sample. Worked with PT/OT. Continue to monitor.

## 2022-08-09 NOTE — PROGRESS NOTES
Care Management Follow Up    Length of Stay (days): 14    Expected Discharge Date: 08/11/2022     Concerns to be Addressed: discharge planning     Patient plan of care discussed at interdisciplinary rounds: Yes    Anticipated Discharge Disposition: Home with out patient PT  Anticipated Discharge Services: Home Care, PT  Anticipated Discharge DME: None noted    Patient/family educated on Medicare website which has current facility and service quality ratings: no  Education Provided on the Discharge Plan:  yes  Patient/Family in Agreement with the Plan:  yes    Referrals Placed by CM/SW:  Home Care Agencies  Private pay costs discussed: Not applicable    Additional Information:    Writer spoke with provider and patient is medically ready to discharge as soon as tomorrow with recs home with PT assistance. Home care referrals made.     Writer met with patient. Patient states she will discharge to her aunt's home. Patient states she does not want any type of home care, and is willing and able to get to outpatient PT. Writer relayed this information to provider, Bertha. Patient stated she uses a walker at home, and has a shower chair.     Patient stated she will get transportation at discharge from a friend or family.    [place] [phone] - denied due to at capacity with her insurance  [facility] -  [phone]/ [phone] fax: [phone]   [facility]  (ph:[phone] fx:[phone]) - faxed   [facility] (ph:[phone] fx:[phone]) -faxed  [facility] (ph:[phone] fx:[phone]) - declined, at capacity  [facility]  (ph:[phone])-declined - at capacity for lymph wraps  [facility] (ph:[phone] fx:[phone]) - declined due to no staff available.   [facility] (ph:[phone] fx:[phone]) - left message  [facility]  - declined, no staffing  ph:[phone] fx: [phone]     RNCC team will continue to follow this patient to  assist with safe discharge planning as needed.       Jesusita Selby RN, BSN  Care Coordinator 7D  Office: [phone]  Pager: [phone]    To contact the weekend [facility] (0800 - 1630) Saturday and Sunday    Units: 4A, 4C, 4E, 5A and 5B- Pager 1: [phone]    Units: 6A, 6B, 6C, 6D- Pager 2: [phone]    Units: 7A, 7B, 7C, 7D, and 5C-Pager 3: [phone]

## 2022-08-09 NOTE — PLAN OF CARE
Time: 8059-9612    Afebrile with VSS on RA. Endorses pain in hands, feet, and rectum.  PO dilaudid and robaxin given. Denies SOB/nausea. UAL to bedside commode. Voiding well, waiting to collect urine sample. LBM 8/8. A/Ox4 and pleasant. BG checks were 116 and 206. Edematous BLE, compression wraps on. Fell asleep while eating a late dinner after meds. Food put in fridge. Refused AM labs. Continue with POC.

## 2022-08-10 LAB
ANION GAP SERPL CALCULATED.3IONS-SCNC: 8 MMOL/L (ref 7–15)
BUN SERPL-MCNC: 2.6 MG/DL (ref 6–20)
CALCIUM SERPL-MCNC: 8.4 MG/DL (ref 8.6–10)
CHLORIDE SERPL-SCNC: 110 MMOL/L (ref 98–107)
CREAT SERPL-MCNC: 0.76 MG/DL (ref 0.51–0.95)
CREAT UR-MCNC: 86.9 MG/DL
DEPRECATED HCO3 PLAS-SCNC: 23 MMOL/L (ref 22–29)
ERYTHROCYTE [DISTWIDTH] IN BLOOD BY AUTOMATED COUNT: 20.8 % (ref 10–15)
GFR SERPL CREATININE-BSD FRML MDRD: >90 ML/MIN/1.73M2
GLUCOSE BLDC GLUCOMTR-MCNC: 103 MG/DL (ref 70–99)
GLUCOSE BLDC GLUCOMTR-MCNC: 118 MG/DL (ref 70–99)
GLUCOSE BLDC GLUCOMTR-MCNC: 121 MG/DL (ref 70–99)
GLUCOSE SERPL-MCNC: 97 MG/DL (ref 70–99)
HCT VFR BLD AUTO: 29.4 % (ref 35–47)
HGB BLD-MCNC: 8.9 G/DL (ref 11.7–15.7)
MCH RBC QN AUTO: 34.1 PG (ref 26.5–33)
MCHC RBC AUTO-ENTMCNC: 30.3 G/DL (ref 31.5–36.5)
MCV RBC AUTO: 113 FL (ref 78–100)
PLATELET # BLD AUTO: 251 10E3/UL (ref 150–450)
POTASSIUM SERPL-SCNC: 3.8 MMOL/L (ref 3.4–5.3)
RBC # BLD AUTO: 2.61 10E6/UL (ref 3.8–5.2)
SARS-COV-2 RNA RESP QL NAA+PROBE: NEGATIVE
SODIUM SERPL-SCNC: 141 MMOL/L (ref 136–145)
WBC # BLD AUTO: 10.9 10E3/UL (ref 4–11)

## 2022-08-10 PROCEDURE — 250N000013 HC RX MED GY IP 250 OP 250 PS 637: Performed by: COLON & RECTAL SURGERY

## 2022-08-10 PROCEDURE — 80048 BASIC METABOLIC PNL TOTAL CA: CPT

## 2022-08-10 PROCEDURE — 99232 SBSQ HOSP IP/OBS MODERATE 35: CPT | Mod: GC | Performed by: STUDENT IN AN ORGANIZED HEALTH CARE EDUCATION/TRAINING PROGRAM

## 2022-08-10 PROCEDURE — 250N000013 HC RX MED GY IP 250 OP 250 PS 637

## 2022-08-10 PROCEDURE — 36415 COLL VENOUS BLD VENIPUNCTURE: CPT

## 2022-08-10 PROCEDURE — 99207 PR NO BILLABLE SERVICE THIS VISIT: CPT | Performed by: UROLOGY

## 2022-08-10 PROCEDURE — 85027 COMPLETE CBC AUTOMATED: CPT

## 2022-08-10 PROCEDURE — 94660 CPAP INITIATION&MGMT: CPT

## 2022-08-10 PROCEDURE — 120N000002 HC R&B MED SURG/OB UMMC

## 2022-08-10 PROCEDURE — 999N000157 HC STATISTIC RCP TIME EA 10 MIN

## 2022-08-10 RX ORDER — HYDROMORPHONE HYDROCHLORIDE 2 MG/1
2-4 TABLET ORAL EVERY 4 HOURS PRN
Status: DISCONTINUED | OUTPATIENT
Start: 2022-08-10 | End: 2022-08-11 | Stop reason: HOSPADM

## 2022-08-10 RX ORDER — OLANZAPINE 10 MG/1
TABLET ORAL
Qty: 30 TABLET | Refills: 3 | Status: SHIPPED | OUTPATIENT
Start: 2022-08-10 | End: 2023-03-23

## 2022-08-10 RX ADMIN — METHOCARBAMOL 1000 MG: 500 TABLET ORAL at 16:15

## 2022-08-10 RX ADMIN — VENLAFAXINE HYDROCHLORIDE 225 MG: 150 CAPSULE, EXTENDED RELEASE ORAL at 09:20

## 2022-08-10 RX ADMIN — FOLIC ACID 1 MG: 1 TABLET ORAL at 09:21

## 2022-08-10 RX ADMIN — OLANZAPINE 10 MG: 10 TABLET, FILM COATED ORAL at 21:58

## 2022-08-10 RX ADMIN — HYDROMORPHONE HYDROCHLORIDE 4 MG: 2 TABLET ORAL at 09:20

## 2022-08-10 RX ADMIN — APIXABAN 5 MG: 5 TABLET, FILM COATED ORAL at 09:21

## 2022-08-10 RX ADMIN — APIXABAN 5 MG: 5 TABLET, FILM COATED ORAL at 20:58

## 2022-08-10 RX ADMIN — WITCH HAZEL: 500 SOLUTION RECTAL; TOPICAL at 16:16

## 2022-08-10 RX ADMIN — ANORECTAL OINTMENT: 15.7; .44; 24; 20.6 OINTMENT TOPICAL at 21:07

## 2022-08-10 RX ADMIN — METHOCARBAMOL 1000 MG: 500 TABLET ORAL at 09:19

## 2022-08-10 RX ADMIN — ACETAMINOPHEN 650 MG: 325 TABLET, FILM COATED ORAL at 20:58

## 2022-08-10 RX ADMIN — PREGABALIN 200 MG: 100 CAPSULE ORAL at 14:24

## 2022-08-10 RX ADMIN — PSYLLIUM HUSK 1 PACKET: 3.4 POWDER ORAL at 09:20

## 2022-08-10 RX ADMIN — PREGABALIN 200 MG: 100 CAPSULE ORAL at 20:58

## 2022-08-10 RX ADMIN — PANTOPRAZOLE SODIUM 40 MG: 40 TABLET, DELAYED RELEASE ORAL at 16:05

## 2022-08-10 RX ADMIN — PREGABALIN 200 MG: 100 CAPSULE ORAL at 09:20

## 2022-08-10 RX ADMIN — POLYETHYLENE GLYCOL 3350 17 G: 17 POWDER, FOR SOLUTION ORAL at 09:20

## 2022-08-10 RX ADMIN — POTASSIUM CHLORIDE 20 MEQ: 750 TABLET, EXTENDED RELEASE ORAL at 09:21

## 2022-08-10 RX ADMIN — WITCH HAZEL: 500 SOLUTION RECTAL; TOPICAL at 21:07

## 2022-08-10 RX ADMIN — ANORECTAL OINTMENT: 15.7; .44; 24; 20.6 OINTMENT TOPICAL at 09:26

## 2022-08-10 RX ADMIN — WITCH HAZEL: 500 SOLUTION RECTAL; TOPICAL at 09:26

## 2022-08-10 RX ADMIN — HYDROMORPHONE HYDROCHLORIDE 2 MG: 2 TABLET ORAL at 21:58

## 2022-08-10 RX ADMIN — Medication 1 TABLET: at 09:21

## 2022-08-10 RX ADMIN — PANTOPRAZOLE SODIUM 40 MG: 40 TABLET, DELAYED RELEASE ORAL at 09:21

## 2022-08-10 RX ADMIN — HYDROMORPHONE HYDROCHLORIDE 4 MG: 2 TABLET ORAL at 16:15

## 2022-08-10 RX ADMIN — HYDROMORPHONE HYDROCHLORIDE 2 MG: 2 TABLET ORAL at 21:03

## 2022-08-10 ASSESSMENT — ACTIVITIES OF DAILY LIVING (ADL)
ADLS_ACUITY_SCORE: 48
ADLS_ACUITY_SCORE: 46
ADLS_ACUITY_SCORE: 48
ADLS_ACUITY_SCORE: 46
ADLS_ACUITY_SCORE: 48
ADLS_ACUITY_SCORE: 46
ADLS_ACUITY_SCORE: 48
ADLS_ACUITY_SCORE: 46

## 2022-08-10 NOTE — CARE PLAN
Occupational Therapy Discharge Summary    Reason for therapy discharge:    All goals and outcomes met, no further IP OT needs identified.    Progress towards therapy goal(s). See goals on Care Plan in [facility] electronic health record for goal details.  Goals met. Pt is ambulating IND-Mod I w/ FWW or pushing WC. Pt completes ADLs w/ SBA, except needs Min A to thread feet through LB clothing. Pt reports she has PCA assist for this task and for showering. Pt is refusing HH OT.    Therapy recommendation(s):    Continued therapy is recommended.  Rationale/Recommendations:  OP PT to progress strength and endurance.. Pt would benefit from XXL or flexible sock aid to don socks Mod I.

## 2022-08-10 NOTE — PLAN OF CARE
Pt a&ox4. Rated  pain 8/10 and prn dilaudid administered at 2040 and effective through the night. Had blood stools this shift and bowel sounds present x4. Lungs clear with sats>92% on RA. Regular diet. Right PIV SL. UC was positive of bacteria and culture ordered. Covid-19 was negative. Continue to have soft B/Ps. Has Edema to BLE.

## 2022-08-10 NOTE — PROGRESS NOTES
Patient has been educated on potential risks of choosing to leave the unit and that the responsibility for patient well-being will belong to the patient. Pt has been informed that admission to hospital is due to need for medical treatment. Education given to the patient on some of the potential risks included but are not limited to:      - lack of access to nursing intervention      - possible missed appointments with MD, therapies, tests      - possible missed medications, antibiotics, management of IV's    Patient Response:yes    Patient notified staff prior to leaving unit: yes  [place]an wrap placed over IV prior to pt leaving unit yes

## 2022-08-10 NOTE — CONSULTS
Urology Consult    Name: Hilaria Bonner    MRN: [id number]   YOB: 1990               Chief Complaint:   Incontinence    History is obtained from the patient and chart review          History of Present Illness:   Hilaria Bonner is a 31 year old female with PMH of HTN, T2DM, sleeve gastrectomy, PE/DVT c/b by PEA arrest on xarelto who presented to the hospital 7/26/22 in hypotensive shock secondary to likely GI bleed that has since resolved after resuscitation and hemorrhoidectomy on 8/3. Urology consulted for incontinence that is very distressing to her. She has no previous urologic history. She had a catheter placed perioperatively this admission due to retention with ~400 on PVR. This was removed 8/5 although she did have several elevated PVR's requiring intermittent catheterization and was started on prazosin in the days after indwelling catheter removal . She has had no PVR's > 200 in the last several days.     She currently reports significant urgency and trouble making it to the bathroom resulting in large volume leakage. She reports some mild leakage without knowledge, but is mainly concerned with the large volume events. She is unsure how often she has been voiding, thinks somewhere between 3-6 times daily. She denies any previous issue with urgency or leakage prior to this hospitalization. No associated leakage with sneezing, coughing, maybe with standing up.     Denies dysuria, foul-smelling urine, frequency, flank pain, nausea, vomiting, chills/fever. She does have some blood in her urine but thinks this is from her recent hemorrhoidectomy. She does not feel this is similar to when she had UTI many years ago. Has been having normal bowel movements without issue.          Past Medical History:     Past Medical History:   Diagnosis Date     Acute kidney injury (H) 05/13/2019     Acute massive pulmonary embolism (H) 05/13/2019     Acute pancreatitis 08/18/2018     Acute pancreatitis  2021    due to ETOH     Acute thoracic back pain 2021     ARAMIS (acute kidney injury) (H) 2019     Cardiac arrest, cause unspecified (H) 2019    massive pulmonary embolism with pulseless electrical activity cardiac arrest in May 2019 following gastric bypass in 2019      Depression with anxiety      GERD (gastroesophageal reflux disease)      History of alcohol use disorder      HTN (hypertension)      Infection due to 2019 novel coronavirus 2020    COVID19 infection in 2020     Ischemic colitis (H) 2019     Morbid obesity (H)      Scalp laceration, initial encounter 2020            Past Surgical History:     Past Surgical History:   Procedure Laterality Date      SECTION       COLONOSCOPY N/A 2022    Procedure: COLONOSCOPY;  Surgeon: Chandrakant Toledo MD;  Location: [facility]     ESOPHAGOSCOPY, GASTROSCOPY, DUODENOSCOPY (EGD), COMBINED N/A 2022    Procedure: ESOPHAGOGASTRODUODENOSCOPY, WITH BIOPSY;  Surgeon: Chandrakant Toledo MD;  Location: [facility]     EXAM UNDER ANESTHESIA ANUS N/A 8/3/2022    Procedure: EXAM UNDER ANESTHESIA, ANUS;  Surgeon: Chip Way MD;  Location: [facility]     HEMORRHOIDECTOMY EXTERNAL N/A 8/3/2022    Procedure: HEMORRHOIDECTOMY, EXTERNAL;  Surgeon: Chip Way MD;  Location: [facility]     LAPAROSCOPIC GASTRIC SLEEVE N/A 2019    Procedure: Laparoscopic Sleeve Gastrectomy;  Surgeon: Luan Lopez MD;  Location: [facility]     ORTHOPEDIC SURGERY      2 knee meniscus surgery            Social History:     Social History     Tobacco Use     Smoking status: Current Every Day Smoker     Packs/day: 0.25     Years: 1.00     Pack years: 0.25     Types: Cigarettes     Start date: 2016     Smokeless tobacco: Never Used   Substance Use Topics     Alcohol use: Not Currently     Comment: Quit drinking when last hospitalized            Family History:     Family History   Problem Relation Age of Onset     Pulmonary  Embolism Mother      Diabetes Father      Hypertension Father      Breast Cancer Sister 26        surgery only     Cancer Sister      Breast Cancer Sister      Mental Illness Sister         Bipolar     Coronary Artery Disease Other         paternal aunt     Thyroid Disease Other         neice     Coronary Artery Disease Other      Cerebrovascular Disease Other      Thyroid Disease Other      Liver Disease No family hx of      Colon Cancer No family hx of             Allergies:   No Known Allergies         Medications:     Current Facility-Administered Medications   Medication     acetaminophen (TYLENOL) tablet 650 mg     apixaban ANTICOAGULANT (ELIQUIS) tablet 5 mg     artificial saliva (BIOTENE DRY MOUTHWASH) liquid 15 mL     glucose gel 15-30 g    Or     dextrose 50 % injection 25-50 mL    Or     glucagon injection 1 mg     folic acid (FOLVITE) tablet 1 mg     folic acid-vit B6-vit B12 (FOLGARD) per tablet 1 tablet     HYDROmorphone (DILAUDID) tablet 4 mg     hydrOXYzine (ATARAX) tablet 25 mg     insulin aspart (NovoLOG) injection (RAPID ACTING)     insulin aspart (NovoLOG) injection (RAPID ACTING)     lidocaine (XYLOCAINE) 2 % external gel     magic mouthwash suspension (diphenhydramine, lidocaine, aluminum-magnesium & simethicone)     menthol-zinc oxide (CALMOSEPTINE) 0.44-20.6 % ointment OINT     methocarbamol (ROBAXIN) tablet 500-1,000 mg     naloxone (NARCAN) injection 0.2 mg    Or     naloxone (NARCAN) injection 0.4 mg    Or     naloxone (NARCAN) injection 0.2 mg    Or     naloxone (NARCAN) injection 0.4 mg     nicotine (NICOTROL) inhalation solution 1 spray     nicotine (NICOTROL) Inhaler 1 Cartridge     OLANZapine (zyPREXA) tablet 10 mg     ondansetron (ZOFRAN ODT) ODT tab 4 mg     pantoprazole (PROTONIX) EC tablet 40 mg     polyethylene glycol (MIRALAX) Packet 17 g     potassium chloride ER (KLOR-CON M) CR tablet 20 mEq     [Held by provider] prazosin (MINIPRESS) capsule 1 mg     pregabalin (LYRICA)  capsule 200 mg     [START ON 8/11/2022] psyllium (METAMUCIL/KONSYL) Packet 2 packet     venlafaxine (EFFEXOR XR) 24 hr capsule 225 mg     witch hazel-glycerin (TUCKS) pad             Review of Systems:    ROS: 10 point ROS neg other than the symptoms noted above in the HPI           Physical Exam:   VS:  T: 97.7    HR: 88    BP: 123/92    RR: 18   GEN:  AOx3.  NAD.    CV:  RRR  LUNGS: Non-labored breathing.   BACK:  No midline or CVA tenderness.  ABD:  Soft.  NT.  ND.  No rebound or guarding.  No masses.  EXT:  Warm, well perfused.   SKIN:  Warm.  Dry.  No rashes.  NEURO:  CN grossly intact.            Data:   All laboratory data reviewed:    Recent Labs   Lab 08/10/22  0910 08/09/22  1047 08/08/22  0657 08/07/22  0708   WBC 10.9 11.5* 8.2 7.9   HGB 8.9* 8.7* 7.4* 7.6*    253 200 230     Recent Labs   Lab 08/10/22  1353 08/10/22  0924 08/10/22  0910 08/09/22  2208 08/09/22  1433 08/09/22  1047 08/08/22  1900 08/08/22  1552 08/08/22  1231 08/08/22  0657 08/07/22  0858 08/07/22  0708   NA  --   --  141  --   --  141  --   --   --  139  --  140   POTASSIUM  --   --  3.8  --   --  3.9  --  3.9  --  3.2*  --  3.6   CHLORIDE  --   --  110*  --   --  110*  --   --   --  109*  --  111*   CO2  --   --  23  --   --  23  --   --   --  21*  --  21*   BUN  --   --  2.6*  --   --  3.2*  --   --   --  4.3*  --  4.4*   CR  --   --  0.76  --   --  0.78  --   --   --  0.82  --  0.88   * 118* 97 94   < > 123*   < >  --    < > 116*   < > 110*   QUINCY  --   --  8.4*  --   --  8.4*  --   --   --  8.3*  --  8.3*   MAG  --   --   --   --   --   --   --  1.8  --  1.5*  --   --     < > = values in this interval not displayed.     Recent Labs   Lab 08/09/22  2214   COLOR Yellow   APPEARANCE Slightly Cloudy*   URINEGLC Negative   URINEBILI Negative   URINEKETONE Negative   SG 1.013   URINEPH 6.0   PROTEIN 10 *   NITRITE Negative   LEUKEST Large*   RBCU 2   WBCU >182*     No imaging to review         Impression and Plan:    Impression:   Hilaria Bonner is a 31 year old female with PMH of HTN, T2DM, sleeve gastrectomy, PE/DVT c/b by PEA arrest on xarelto who presented to the hospital 7/26/22 in hypotensive shock secondary to likely GI bleed that has since resolved after resuscitation and hemorrhoidectomy on 8/3. Urology consulted for incontinence that is very distressing to her. She was started on prazosin by her primary team due to some perioperative retention requiring intermittent catheterization that seems to have resolved based on her recent bladder scans, no concern for overflow incontinence at this time and no need to continue alpha blocker for her. Her UA was obtained via clean catch and is clearly contaminated. Would recommend gathering a sample via straight cath and sending for culture. If that is positive, would treat UTI and presume this to be the cause of her new urinary symptoms. Otherwise, she could just have urge incontinence related to her recent catheterization and nearby surgery. Given her recent hemorrhoidectomy, would recommend myrbetriq if her culture is negative to help relax bladder in place of anticholinergics which may cause constipation.     - Urine culture via straight cath sample.  - If urine culture negative tomorrow, would recommend Myrbetriq for urgency. If not covered by insurance, can try anticholinergics but would weigh the distress she experiences with leakage vs potential constipation and poor surgical healing. Please notify urology as we will set up for outpatient follow up to further manage urgency symptoms.          - Urology will sign off. Please contact resident/PA on call with any questions or concerns.         This patient's exam findings, labs, and imaging discussed with urology staff surgeon Dr. Valerio who developed the treatment plan.    Javier Morrison MD  Urology Resident

## 2022-08-10 NOTE — PROGRESS NOTES
Daily Safety Note:    Patient has been educated on potential risks of choosing to leave the unit and that the responsibility for patient well-being will belong to the patient. Pt has been informed that admission to hospital is due to need for medical treatment. Education given to the patient on some of the potential risks included but are not limited to:      - lack of access to nursing intervention      - possible missed appointments with MD, therapies, tests      - possible missed medications, antibiotics, management of IV's    Patient Response: understands and keeps her phone with her at all times. She plans to be back in a timely manner to not miss any visits this AM.    Patient notified staff prior to leaving unit: Yes

## 2022-08-10 NOTE — PLAN OF CARE
[id number]    BP (!) 140/96 (BP Location: Left arm)   Pulse 105   Temp 98.6  F (37  C) (Oral)   Resp 18   Wt 140.5 kg (309 lb 12.8 oz)   LMP  (LMP Unknown)   SpO2 98%   BMI 49.25 kg/m      Rectal pain persists, more prominent after garfield-cares, PO Dilaudid given x2 and Robaxin x2. Patient expressed wanting to wean, order changed to a range to trial tonight. Had 1 medium stool with blood noted on top of it, possibly right stitch off, tucks pads and cream applied as ordered. Donut pillow provided to alleviate pressure. Hgb- 8.9.    Intermittently tachy after ambulation, HR reached 124, not within parameters to notify, overall asymptomatic. Afebrile. Denies nausea and SOB. Appetite fair, blood glucose 118 and 121. BLE pitting edema, lymph wraps on. Had 1 urinary incontinence this AM, post void scan -200 ml. Denies dysuria or bladder fullness. Later voided 100 ml with PRV of 34 ml. Urology rounded, see note. Primary team to assess ABX need prior to discharge. Patient will have a ride to leave by 1600 tomorrow.

## 2022-08-10 NOTE — PROVIDER NOTIFICATION
Provider Notification    Re: Please change PO Dilaudid to a range, so she can take a smaller dose. Patient wants to wean herself.     Action: Notified Dr. Rivas at #[id number]    Response: PRN PO Dilaudid changed to 1 mg-4 mg Q4H

## 2022-08-10 NOTE — PROGRESS NOTES
CLINICAL NUTRITION SERVICES - BRIEF NOTE    Per chart review and interdisciplinary rounds, informed that patient is medically ready for discharge. No further nutrition intervention planned at this time.    RD to sign off at this time. RD can be consulted if needed.       Magdalena Alonzo RD,LD  7D pager [phone]

## 2022-08-10 NOTE — PROGRESS NOTES
Patient has been educated on potential risks of choosing to leave the unit and that the responsibility for patient well-being will belong to the patient. Pt has been informed that admission to hospital is due to need for medical treatment. Education given to the patient on some of the potential risks included but are not limited to:      - lack of access to nursing intervention      - possible missed appointments with MD, therapies, tests      - possible missed medications, antibiotics, management of IV's    Patient Response: Pt understand risk and is able to be reached via cell phone.     Patient notified staff prior to leaving unit: yes  Coban wrap placed over IV prior to pt leaving unit; yes

## 2022-08-10 NOTE — PROGRESS NOTES
[facility]    Progress Note - Medicine Service, [facility] TEAM 1       Date of Admission:  7/26/2022    Assessment & Plan        Hilaria Bonner is a 31 year old female admitted on 7/26/2022. She has a history of HTN, T2DM, alcohol use, and prior PE c/b subsequent PEA arrest on Xarelto, fatty liver disease and anxiety who is admitted for hypotensive shock and anemia likely secondary to hemorrhoidal bleeding.     Changes today:  -patient still with urinary incontinence  -Urology consult    ----------------------------------------------------------------------------------------------------    #Rectal bleeding s/p mixed hemorrhoidectomy   #Hypovolemic shock 2/2 blood loss vs. Dehydration, improving  P/w 3 weeks of hematochezia with associated lightheadedness and fatigue while on Xarelto. Hypotensive with improvement after repeat LR boluses. Baseline hgb 9-10. On arrival, Hgb 6.4, improved to 7.8 after 2 U pRBC. CTA abd/pelvis negative for active extravasation. Infectious stool panel negative. EGD/Colonoscopy performed 7/28 without evidence of acute bleed but presence of hemorrhoids. Hgb continued to wax and wane between 7-9 w/ ongoing bloody stools. Hemorrhoidectomy performed 8/3 with improvement in rectal bleeding. Bps soft this AM but improved with IVF. IVC size equivocal on bedside US 8/8.   -Colorectal surgery following:   -Hemorrhoidectomy performed 8/3--went well   -Miralax added for soft stools. Continue metamucil.    -Continue sitz baths 3-4x daily and tuck 4x4 gauze    -Restarted apixaban 8/4  -Dilaudid 4 mg IV q4h for pain relief   -Encouraged her to not use wash cloths on rectum as this may be aggravating her sutures and causing her to bleed. Given gentle wipes   -BP stable   -Lasix held   -AM CBC checks    #Chronic macrocytic anemia - stable   #Reticulocytosis   Baseline Hgb ~9-10 since 2019 (appears to be normal years prior) w/ MCV ~100. Likely 2/2 alcohol use  disorder vs drug induced macrocytosis (is on chronic folate/B12 supplementation and both folate/B12 levels have been normal). Hgb 6.4 on arrival w/ elevated reticulocyte count consistent with anemia from hemorrhoidal bleeding. Hgb dropped to 6.8-->6.9 on 8/5 (< 48 hours s/p hemorrhoidectomy). Received 1 unit pRBCs. Afternoon check of hgb 8/6 up to 10.1--suspect lab error as back to 7-range today.   -Repeat CBC in AM    #. Urinary incontinence   #. Acute urinary retention, resolving  400 ml of urine retention post-hemorrhoidectomy, likely 2/2 inflammation/swelling from procedure vs prazosin being held 2/2 hypotension. Indwelling dougherty placed by urology-> now removed. Now struggling with urinary incontinence x2.  -UA ordered   -Continue prazosin   -Continue to monitor urine output w/ bladder scans PRN (per protocol)     #. Acute on chronic sinus tachycardia--stable  Resting -115 with intermittent acceleration with ambulation. Says that this has been normal for her since her PE. Pt denies SOB, chest pain, or palpitations.  Sinus rhythm on cardiac monitoring x2 days--now stopped. No evidence for infection on exam and white count normal. Continues to be satting well on room air. Tachycardia improved to low 100's after IV hydration. Suspect tachycardia 2/2 dehydration in the setting of ARAMIS, hypotension, and lactic acidosis vs an autonomic dysfunction.   -Will continue to monitor vitals     #. Mouth pain   C/o tongue pain over the past few days. No relief with magic mouthwash. Also has cracking/pain on corners of mouth. Could be due to dry air vs vitamin deficiency vs medication induced  -B2 level pending  -Biotene mouth wash 4x daily PRN  -Vaseline for lips/corners of mouth     #. Extremity Pains  #. Pitting BLE edema   #. Hypomagnesia   Describes vague aches and pains in her arms and legs. Different from her neuropathy. CK normal. BLE US negative 8/1.   - Continue magnesium replacement protocol   - Lasix  spot-dosing right now for goal 1-2L net neg daily   - Lymphedema wraps to bilateral LEs    #ARAMIS - resolved  Pt presented with creatinine elevated to 2.18 on admission with baseline at 0.7-0.8. Likely due to prerenal hypotension in setting of rectal bleed vs. dehydration. Improved with pRBC and fluid resuscitation.     #Lactic acidosis, resolved  Bicarb improved with volume resuscitation. Suspect contribution of elevated lactate (6/7 on admit, now down to 2.2), hypotension (resolving) and anemia (transfused).     #. Trichomonas Vaginalis- treatment completed 8/9  Called by PCP 8/3 to report that pt's recent PAP smear came back positive for trichomonas. Pt denies any sxs.   -Continue Flagyl 500 mg BID x 7 day course    Chronic Medical Problems  #Hx of PE c/b PEA arrest in 2018  -PTA Xarelto held on arrival 2/2 concern for GI bleed. Started on apixaban 7/28 after bleed not found on EGD/C. May hold apixaban PM if Hgb continues to decline at recheck 7/30   -ECHO showed normal global and regional left ventricular function with EF of 55-60%. No changes compared to previous study.  -Will continue to monitor     #Chronic peripheral neuropathy  -PTA Pregabalin     #Type 2 diabetes  A1c on admission 5.4. Glucose in low 100's since admission   -Hold PTA metformin  -monitor on BMP daily, will increase frequency if needed    #Chronic anxiety  #Nightmares  -Continue PTA Venlafaxine  -restarted Zyprexa   -restarted prazosin    #Obstructive Sleep Apnea  -CPAP at night    #Chronic hypertension  -Hold PTA Lisinopril and hydrochlorothiazide while normotensive and intermittently hypotensive.     #R foot injury   Tripped and fell from standing height on 6/29, inverting her R ankle. Seen at [facility] in [place] 6/30. R ankle XRs negative; R foot XRs with ? Calcaneal fracture at the calcaneal cuboid joint. Given CAM boot, which she has been wearing since. Continued to have R foot pain on admission. Repeat R foot XR without calcaneal fracture but  there are ossific fragment projections over the proximal second metatarsal. No further workup or intervention recommended, per Ortho.   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 Appears pt has been following with neurology. Sx onset April 2020 4 weeks post-covid. NCS in 7/20 showed absent sensory responses in the upper and lower limbs and normal motor responses. Has been on maintenance IVIG q3 weeks (recently decreased to [id number]) since then and sx managed well with this per last neuro note 5/9/22.  -IVIG infusion given 8/4    #Hx of alcohol abuse  #Hx of alcohol induced pancreatitis   Used to drink 1L vodka daily. Hospitalized Feb. 2021 for alcohol induced pancreatitis and has been sober since. Hepatosteatosis noted on CTAP 7/26/22.       Diet: Regular Diet Adult    DVT Prophylaxis: apixaban 5 mg BID  Gonzales Catheter: Not present  Fluids: LR fluid bolus  Central Lines: None  Cardiac Monitoring: None  Code Status: Full Code      The patient's care was discussed with the Attending Physician, Dr. Florence.    Arabella Owen MD  Medicine Service, [facility] TEAM [address] [place] [address]  Securely message with the Vocera Web Console (learn more here)  Text page via [facility] Paging/Directory   Please see signed in provider for up to date coverage information      Clinically Significant Risk Factors Present on Admission                     ______________________________________________________________________    Interval History   Patient with two episodes of urinary incontinence overnight.  Patient lethargic on exam this morning. Denying abdominal pain.    Visited patient in afternoon. Patient endorsing urinary incontinence, stated she feels though she has strong urgency and isn't able to make it to toilet. Also notes she has had feeling of sitting on toilet for  periods of time without being able to void.     Data reviewed today: I reviewed all medications, new labs and imaging results over the last 24 hours.    Physical Exam   Vital Signs: Temp: 97.4  F (36.3  C) Temp src: Temporal BP: 108/65 Pulse: 86   Resp: 18 SpO2: 100 % O2 Device: BiPAP/CPAP    Weight: 309 lbs 12.8 oz  General Appearance: No acute distress   Respiratory: clear lung sounds, no increased work of breathing  Cardiovascular: regular rate and rhythm, no murmur   GI: Non distended. Soft, non tender  Extremities: 2+ pitting edema in extremities   Other: Alert and awake     Data   Recent Labs   Lab 08/09/22  2208 08/09/22  1716 08/09/22  1433 08/09/22  1047 08/08/22  1900 08/08/22  1552 08/08/22  1231 08/08/22  0657 08/07/22  0858 08/07/22  0708   WBC  --   --   --  11.5*  --   --   --  8.2  --  7.9   HGB  --   --   --  8.7*  --   --   --  7.4*  --  7.6*   MCV  --   --   --  112*  --   --   --  114*  --  112*   PLT  --   --   --  253  --   --   --  200  --  230   NA  --   --   --  141  --   --   --  139  --  140   POTASSIUM  --   --   --  3.9  --  3.9  --  3.2*  --  3.6   CHLORIDE  --   --   --  110*  --   --   --  109*  --  111*   CO2  --   --   --  23  --   --   --  21*  --  21*   BUN  --   --   --  3.2*  --   --   --  4.3*  --  4.4*   CR  --   --   --  0.78  --   --   --  0.82  --  0.88   ANIONGAP  --   --   --  8  --   --   --  9  --  8   QUINCY  --   --   --  8.4*  --   --   --  8.3*  --  8.3*   GLC 94 162* 139* 123*   < >  --    < > 116*   < > 110*    < > = values in this interval not displayed.

## 2022-08-11 ENCOUNTER — APPOINTMENT (OUTPATIENT)
Dept: PHYSICAL THERAPY | Facility: CLINIC | Age: 32
End: 2022-08-11
Payer: COMMERCIAL

## 2022-08-11 ENCOUNTER — APPOINTMENT (OUTPATIENT)
Dept: OCCUPATIONAL THERAPY | Facility: CLINIC | Age: 32
End: 2022-08-11
Payer: COMMERCIAL

## 2022-08-11 ENCOUNTER — HOME INFUSION (PRE-WILLOW HOME INFUSION) (OUTPATIENT)
Dept: PHARMACY | Facility: CLINIC | Age: 32
End: 2022-08-11

## 2022-08-11 VITALS
RESPIRATION RATE: 16 BRPM | OXYGEN SATURATION: 99 % | TEMPERATURE: 98.2 F | WEIGHT: 293 LBS | HEART RATE: 95 BPM | DIASTOLIC BLOOD PRESSURE: 71 MMHG | SYSTOLIC BLOOD PRESSURE: 111 MMHG | BODY MASS INDEX: 50.75 KG/M2

## 2022-08-11 PROBLEM — R32 URINARY INCONTINENCE: Status: ACTIVE | Noted: 2022-08-11

## 2022-08-11 LAB
ANION GAP SERPL CALCULATED.3IONS-SCNC: 9 MMOL/L (ref 7–15)
BUN SERPL-MCNC: 2.7 MG/DL (ref 6–20)
CALCIUM SERPL-MCNC: 8.5 MG/DL (ref 8.6–10)
CHLORIDE SERPL-SCNC: 109 MMOL/L (ref 98–107)
CREAT SERPL-MCNC: 0.87 MG/DL (ref 0.51–0.95)
DEPRECATED HCO3 PLAS-SCNC: 22 MMOL/L (ref 22–29)
ERYTHROCYTE [DISTWIDTH] IN BLOOD BY AUTOMATED COUNT: 20.7 % (ref 10–15)
GFR SERPL CREATININE-BSD FRML MDRD: >90 ML/MIN/1.73M2
GLUCOSE BLDC GLUCOMTR-MCNC: 118 MG/DL (ref 70–99)
GLUCOSE BLDC GLUCOMTR-MCNC: 130 MG/DL (ref 70–99)
GLUCOSE BLDC GLUCOMTR-MCNC: 146 MG/DL (ref 70–99)
GLUCOSE SERPL-MCNC: 122 MG/DL (ref 70–99)
HCT VFR BLD AUTO: 29.9 % (ref 35–47)
HGB BLD-MCNC: 9 G/DL (ref 11.7–15.7)
MCH RBC QN AUTO: 33.7 PG (ref 26.5–33)
MCHC RBC AUTO-ENTMCNC: 30.1 G/DL (ref 31.5–36.5)
MCV RBC AUTO: 112 FL (ref 78–100)
PLATELET # BLD AUTO: 300 10E3/UL (ref 150–450)
POTASSIUM SERPL-SCNC: 3.5 MMOL/L (ref 3.4–5.3)
RBC # BLD AUTO: 2.67 10E6/UL (ref 3.8–5.2)
SODIUM SERPL-SCNC: 140 MMOL/L (ref 136–145)
WBC # BLD AUTO: 8.9 10E3/UL (ref 4–11)

## 2022-08-11 PROCEDURE — 80048 BASIC METABOLIC PNL TOTAL CA: CPT

## 2022-08-11 PROCEDURE — 99239 HOSP IP/OBS DSCHRG MGMT >30: CPT | Mod: GC | Performed by: STUDENT IN AN ORGANIZED HEALTH CARE EDUCATION/TRAINING PROGRAM

## 2022-08-11 PROCEDURE — 97140 MANUAL THERAPY 1/> REGIONS: CPT | Mod: GO

## 2022-08-11 PROCEDURE — 85027 COMPLETE CBC AUTOMATED: CPT

## 2022-08-11 PROCEDURE — 250N000013 HC RX MED GY IP 250 OP 250 PS 637

## 2022-08-11 PROCEDURE — 97530 THERAPEUTIC ACTIVITIES: CPT | Mod: GP

## 2022-08-11 PROCEDURE — 250N000013 HC RX MED GY IP 250 OP 250 PS 637: Performed by: COLON & RECTAL SURGERY

## 2022-08-11 PROCEDURE — 97110 THERAPEUTIC EXERCISES: CPT | Mod: GO

## 2022-08-11 PROCEDURE — 36415 COLL VENOUS BLD VENIPUNCTURE: CPT

## 2022-08-11 PROCEDURE — 999N000157 HC STATISTIC RCP TIME EA 10 MIN

## 2022-08-11 PROCEDURE — 97116 GAIT TRAINING THERAPY: CPT | Mod: GP

## 2022-08-11 PROCEDURE — 94660 CPAP INITIATION&MGMT: CPT

## 2022-08-11 RX ORDER — SULFAMETHOXAZOLE/TRIMETHOPRIM 800-160 MG
1 TABLET ORAL 2 TIMES DAILY
Qty: 6 TABLET | Refills: 0 | Status: SHIPPED | OUTPATIENT
Start: 2022-08-11 | End: 2023-04-20

## 2022-08-11 RX ORDER — HYDROMORPHONE HYDROCHLORIDE 2 MG/1
2-4 TABLET ORAL EVERY 6 HOURS PRN
Qty: 10 TABLET | Refills: 0 | Status: SHIPPED | OUTPATIENT
Start: 2022-08-11 | End: 2022-08-14

## 2022-08-11 RX ADMIN — POLYETHYLENE GLYCOL 3350 17 G: 17 POWDER, FOR SOLUTION ORAL at 08:27

## 2022-08-11 RX ADMIN — PREGABALIN 200 MG: 100 CAPSULE ORAL at 14:55

## 2022-08-11 RX ADMIN — PSYLLIUM HUSK 2 PACKET: 3.4 POWDER ORAL at 08:27

## 2022-08-11 RX ADMIN — HYDROMORPHONE HYDROCHLORIDE 4 MG: 2 TABLET ORAL at 08:26

## 2022-08-11 RX ADMIN — WITCH HAZEL: 500 SOLUTION RECTAL; TOPICAL at 14:30

## 2022-08-11 RX ADMIN — PREGABALIN 200 MG: 100 CAPSULE ORAL at 08:35

## 2022-08-11 RX ADMIN — HYDROXYZINE HYDROCHLORIDE 25 MG: 25 TABLET, FILM COATED ORAL at 09:54

## 2022-08-11 RX ADMIN — METHOCARBAMOL 1000 MG: 500 TABLET ORAL at 12:19

## 2022-08-11 RX ADMIN — FOLIC ACID 1 MG: 1 TABLET ORAL at 08:28

## 2022-08-11 RX ADMIN — VENLAFAXINE HYDROCHLORIDE 225 MG: 150 CAPSULE, EXTENDED RELEASE ORAL at 08:28

## 2022-08-11 RX ADMIN — HYDROMORPHONE HYDROCHLORIDE 4 MG: 2 TABLET ORAL at 16:37

## 2022-08-11 RX ADMIN — Medication 1 TABLET: at 08:28

## 2022-08-11 RX ADMIN — ACETAMINOPHEN 650 MG: 325 TABLET, FILM COATED ORAL at 12:19

## 2022-08-11 RX ADMIN — APIXABAN 5 MG: 5 TABLET, FILM COATED ORAL at 08:28

## 2022-08-11 RX ADMIN — ANORECTAL OINTMENT: 15.7; .44; 24; 20.6 OINTMENT TOPICAL at 14:30

## 2022-08-11 RX ADMIN — PANTOPRAZOLE SODIUM 40 MG: 40 TABLET, DELAYED RELEASE ORAL at 16:38

## 2022-08-11 RX ADMIN — NICOTINE 1 CARTRIDGE: 4 INHALANT RESPIRATORY (INHALATION) at 12:26

## 2022-08-11 RX ADMIN — PANTOPRAZOLE SODIUM 40 MG: 40 TABLET, DELAYED RELEASE ORAL at 08:28

## 2022-08-11 RX ADMIN — POTASSIUM CHLORIDE 20 MEQ: 750 TABLET, EXTENDED RELEASE ORAL at 08:28

## 2022-08-11 ASSESSMENT — ACTIVITIES OF DAILY LIVING (ADL)
ADLS_ACUITY_SCORE: 46

## 2022-08-11 NOTE — PLAN OF CARE
A&O times 4. Pt denies nausea, SOB, and dizziness. Pt has moderate pain prn medication available. Pt has no further needs and is safe with call light in reach.     Pt refusing bladder scan stating that she urinated    Plan of Care Reviewed With: patient     Overall Patient Progress: no change

## 2022-08-11 NOTE — PROGRESS NOTES
Care Management Discharge Note    Discharge Date: 08/11/2022       Discharge Disposition: Home Care, outpatient OT/PT     Discharge Services: None    Discharge DME: None    Discharge Transportation: aunt    Private pay costs discussed: Not applicable    PAS Confirmation Code:  n/a  Patient/family educated on Medicare website which has current facility and service quality ratings: no    Education Provided on the Discharge Plan:  yes  Persons Notified of Discharge Plans:   Patient, bedside nurse, charge nurse, rehab team  Patient/Family in Agreement with the Plan:  yes    Handoff Referral Completed: Yes    Additional Information:  Writer met with patient to discuss discharge plans.  Patient stated she still does not want any in home cares.     Patient stated her aunt is done with work at 4pm, will come to pick her up, and bring her to stay at her aunt's home.    Patient stated no other discharge needs at this time.      Jesusita Selby RN, BSN  Care Coordinator 7D  Office: [phone]  Pager: [phone]    To contact the weekend [facility] (0800 - 1630) Saturday and Sunday    Units: 4A, 4C, 4E, 5A and 5B- Pager 1: [phone]    Units: 6A, 6B, 6C, 6D- Pager 2: [phone]    Units: 7A, 7B, 7C, 7D, and 5C-Pager 3: [phone]

## 2022-08-11 NOTE — PROVIDER NOTIFICATION
Provider Notification    Re: Please follow up about UA, days was unable to collect vis St8 cath or patient void. There was no response when day RN paged. Patient is planning to discharge within the next couple hours.     Action: Notified Dr. Owen at #[id number]    Response: UA is not an emergent need. Patient can follow-up with Urology outpatient    ---------------------------------------------------------------------------------------------------------------------------    Provider Notification    Re: Patient is confused on planned Anti-Coagulant. She states it was going to switch to Eliquis vs continuing Xarelto. Please advise.     Action: Notified Dr. Owen at #[id number]    Response: Eliquis ordered

## 2022-08-11 NOTE — PLAN OF CARE
[id number]  New this shift: UA not collected yet, attempted to straight cath x1. Pt unable to void into specimen cup. Pt refused full skin assessment.     VSS. Pt up ad mikel. Voids spontaneously via bedside commode, saving. Pt refused bladder scan. 1 large BM. Intermittently incontinent, 1 episode of urinary incontinence today. , 130- sliding scale insulin not given with breakfast, pt reported loss of appetite and did not eat meal. PO dilaudid and tylenol given x1 for pain. Hydroxyzine given x1 for anxiety.     Plan of care: Continue POC, plan to discharge this evening to aunt's house.   Goal Outcome Evaluation:  Plan of Care Reviewed With: patient   Overall Patient Progress: no change

## 2022-08-11 NOTE — PLAN OF CARE
Goal Outcome Evaluation:  [id number]  Tachy 100s. OVSS on RA. .Denies nausea and SOB. Rectal pain 8/10. 2mg PO dilaudid and tylenol given this evening with no relief. Additional 2mg given and was effective. 200cc urine mixed with stool at 1900. No void by 2230. Bladder scanned at 2245 was 53. 1 soft, formed BM without blood. No other urinary output during this time period. Interdry applied to abdominal folds, groin, and under breasts due to MASD. UC pending.

## 2022-08-12 ENCOUNTER — TELEPHONE (OUTPATIENT)
Dept: OBGYN | Facility: CLINIC | Age: 32
End: 2022-08-12

## 2022-08-12 ENCOUNTER — PATIENT OUTREACH (OUTPATIENT)
Dept: CARE COORDINATION | Facility: CLINIC | Age: 32
End: 2022-08-12

## 2022-08-12 ENCOUNTER — HOME INFUSION (PRE-WILLOW HOME INFUSION) (OUTPATIENT)
Dept: PHARMACY | Facility: CLINIC | Age: 32
End: 2022-08-12

## 2022-08-12 ENCOUNTER — PATIENT OUTREACH (OUTPATIENT)
Dept: SURGERY | Facility: CLINIC | Age: 32
End: 2022-08-12

## 2022-08-12 LAB — BACTERIA UR CULT: ABNORMAL

## 2022-08-12 NOTE — PROGRESS NOTES
Post Op Note     Called to check on patient postoperatively after hospital discharge.  I left two VMs and a [place] message. Pt has my direct number and I will wait for her to call me back. I did cancel the appt I made on 8/31 as she did not confirm she could make it.

## 2022-08-12 NOTE — PROGRESS NOTES
Clinic Care Coordination Contact  [facility]/Voicemail    Clinical Data: potential CC candidate. TCM needed. Also, see care team referral from 7/26/22.     Outreach attempted x 1.  Left message on patient's voicemail with call back information and requested return call.    Plan:   Care Coordinator will send care coordination introduction letter with care coordinator contact information and explanation of care coordination services via [employer].   Care Coordinator will try to reach patient again in 1-2 business days.    Erum Soto RN, BSN, PHN Care Coordinator  [place] [place]   Phone: [phone]

## 2022-08-12 NOTE — PLAN OF CARE
Physical Therapy Discharge Summary    Reason for therapy discharge:    Discharged to home with outpatient therapy.    Progress towards therapy goal(s). See goals on Care Plan in [facility] electronic health record for goal details.  Goals partially met.  Barriers to achieving goals:   discharge from facility.    Therapy recommendation(s):    Continued therapy is recommended.  Rationale/Recommendations:  ongoing mobility deficits.

## 2022-08-12 NOTE — LETTER
Dear Hilaria,    I am a clinic care coordinator who works with Crissy Madrigal PA-C with the [facility]. I recently tried to call and was unable to reach you. Below is a description of clinic care coordination and how I can further assist you.       The clinic care coordination team is made up of a registered nurse, , financial resource worker and community health worker who understand the health care system. The goal of clinic care coordination is to help you manage your health and improve access to the health care system. Our team works alongside your provider to assist you in determining your health and social needs. We can help you obtain health care and community resources, providing you with necessary information and education. We can work with you through any barriers and develop a care plan that helps coordinate and strengthen the communication between you and your care team.    Please feel free to contact me with any questions or concerns regarding care coordination and what we can offer.      We are focused on providing you with the highest-quality healthcare experience possible.    Sincerely,     Erum Soto RN, BSN, PHN Care Coordinator  [facility]   Phone: [phone]

## 2022-08-12 NOTE — TELEPHONE ENCOUNTER
Attempted to reach patient via telephone in regards to the message below. Patient did not answer, voicemail left encouraging patient to call back to discuss further. Patient may have been trying to schedule with her PCP Rohan, as she has never been seen in our clinic by Sumi Posey CNM.

## 2022-08-12 NOTE — PLAN OF CARE
[id number]    /71 (BP Location: Left arm)   Pulse 95   Temp 98.2  F (36.8  C) (Oral)   Resp 16   Wt 144.8 kg (319 lb 3.2 oz)   LMP  (LMP Unknown)   SpO2 99%   BMI 50.75 kg/m      VSS. Afebrile. Had a BM with small amount of blood on top of stool, rectal pain 10/10, PRN PO Dilaudid given x1. No void, UA was deferred to OP Urology. Denies nausea and SOB. Lymph wraps remained on. PIV removed without issues.

## 2022-08-12 NOTE — DISCHARGE SUMMARY
[facility]  Discharge Summary - Medicine & Pediatrics       Date of Admission:  7/26/2022  Date of Discharge:  8/11/2022  6:45 PM  Discharging Provider: Bertha Gutierrez MD (PGY-3); Safia Florence MD (attending)   Discharge Service: Medicine Service, DARIEL TEAM 1    Discharge Diagnoses   #Rectal bleeding s/p mixed hemorrhoidectomy   #Shock 2/2 blood loss +/- hypovolemia - resolved  #Chronic macrocytic anemia - stable   #Reticulocytosis   #Urinary incontinence   #Acute urinary retention - resolved  #Acute on chronic sinus tachycardia - resolved  #Mouth pain   #Extremity Pains  #Pitting BLE edema   #Hypomagnesemia   #ARAMIS - resolved  #Lactic acidosis, resolved  #Trichomonas Vaginalis - s/p trt  #Hx of PE c/b PEA arrest in 2018  #PTSD with nightmares  #R foot injury   #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  Most anorectal procedures are done as outpatient surgery, and you go home   the same day as the procedure. A few surgical procedures will require that   you stay in the hospital for about one to three days. No matter where the   procedure is done or how long or short it takes, these recommendations   will help you heal and feel more comfortable.    Medicines:  The anal area is very sensitive; you can expect to have some pain for up   to 2-4 weeks after the procedure. Your doctor will give you a prescription   for one or more pain medications.      Take acetaminophen (Tylenol) 650-1000 mg four times a day.   Take this on a regular basis (not as needed) following surgery for pain   control.   Take the lower dose if you are >65 years old or have liver disease. The   maximum dose of  acetaminophen is 4000 mg a day. You can stop the   acetaminophen or reduce the dose when you are feeling better.  It is important to realize that many narcotic pain relievers (including   vicodin, percocet, tylenol #3) also have acetaminophen, and excessive   doses of acetaminophen can be dangerous, so do not take these in addition   to acetaminophen.  You may take narcotics that don't contain acetaminophen   such as oxycodone.      Take oxycodone AS NEEDED in addition to the acetominaphen   Because narcotics have side effects (including constipation), you should   reduce your use of these medications as tolerated as your pain improves.    *In general, the best strategy is to take (if you are able to tolerate it)   the tylenol on a regular basis until your pain has largely gone away. You   can take the narcotic pain medicine as needed in addition to the tylenol.   As your pain begins to lessen, you should cut back on your narcotic use   while continuing to take your regular tylenol doses.    Refilling prescriptions. If you need additional pain medication, please   call the triage nurse at [phone] during normal business hours (8 a.m.   to 4 p.m., Monday though Friday).  If you call after hours or on the   weekends, the doctor on call may not know you personally and may not renew   narcotic pain medication by phone. Call your primary care provider for all   other medication refills.    Perineal care:  Tub baths:  If possible, take a tub bath immediately after each bowel movement.   Baths should be take at least 3 times daily for the first week to 10 days   following your procedure. You should soak in the tub for 10 to 15 minutes   each time with water as warm as you can tolerate.   Even after you go back to work, it is a good idea to sit in the tub in the   morning, after returning from work, and again in the evening before   bedtime.    Bleeding/Infection:  You can expect to have some bleeding after bowel  movements, but it should   stop soon after you wipe.   Use a wet cloth or perianal pad (Tucks or Preparation H pads) to gently   wipe the area after each bowel movement.  Do not rub the anal area or use a lot of pressure.  Using a spray bottle filled with warm water helps loosen any remaining   stool. Blot gently with a soft dry cloth or tissue paper.  Infection around the anal opening is not very common. The anal area has   excellent blood supply, which helps the area to heal. Bloody discharge   after bowel movements is normal and may last 2 to 4 weeks after your   surgery. However, if you bleed between bowel movements and cannot get it   to stop, call the triage nurse immediately [phone].    Bowel function:  Take a fiber supplement such as Metamucil or Citrucel, which is over the   counter. It is important to drink six to eight glasses of water or juice   everyday when using fiber products.    If you do not have a bowel movement after 1-2 days:  Take Milk of Magnesia-2 tablespoons.     If there are no results, repeat this or add over the counter Miralax.    If you still do not have results, contact the clinic.   If there are no results, repeat this. Stop taking Milk of Magnesia or   other laxatives if you begin to have diarrhea.    * Constipation will cause you to strain when you have a bowel movement.   The hard stool will be difficult to pass, will increase pain and bleeding,   and will slow down healing.  Try to avoid constipation and/or diarrhea as   this can make the pain and bleeding worse.    * It is important to have regular bowel movements at least every other day   and to keep your stool soft.  A high fiber diet, including at least four   servings of fruits or vegetables daily, will help to keep your bowel   movements regular and soft.    Activity:  After your procedure, there are no restrictions on your activity   except restrictions surrounding being on narcotics and in pain, such as no   heavy  machine operating or driving.   You may walk, climb stairs, ride in a car, and sit as tolerated.   It is helpful to avoid sitting in one position for long periods (2 or more   hours).  After some surgeries, you may be told not to perform any lifting (more   than 10 pounds) for several weeks after surgery.    When to call:  When do I need to call the doctor or triage nurse?  If you experience any of the problems listed here, call our triage nurse   during business hours ([phone]).   The nurse will help you with your problem or have the doctor call you.   After hours and on weekends, please call the main hospital number   ([phone]) and ask for the colon and rectal surgery person on call.   Some is available to help you 24 hours a day, seven days a week.  Call for:  -  Fever greater than 101 degrees  -  Chills  -  Foul-smelling drainage  -  Nausea and vomiting  -  Diarrhea - greater than 3 water stools in 24 hours  -  Constipation - no bowel movement after 3 days  -  Severe bleeding that does not stop soon after a bowel movement  -  Problems with the incision, including increased pain, swelling, or   redness    NOTIFY  Please contact Yessy Mejia RN or Janki Thrasher RN at [phone] for   problems after discharge such as:  -Temperature > 101F, chills, rigors, dizziness  -Redness around or purulent drainage from wound  -Inability to tolerate diet, nausea or vomiting  -You stop passing gas (or your stoma stops functioning), develop   significant bloating, abdominal pain  -Have blood in stools/vomit  -Have severe diarrhea/constipation  -Any other questions or concerns.  - At nights (after 4:30pm), on weekends, or if urgent, call [phone]   and ask the  to speak with the on-call Colorectal Surgery resident   or fellow      FOLLOW-UP  1.  You will need to follow-up with Nancy Davenport NP in the   Colon and Rectal Surgery clinic in 2-3 week(s) and then with [facility] Staff:   Dr. Saunders  Gaertner in 4-6 weeks after.  Please contact our Nurses   (phone # [phone]) if you have not heard from our clinic in 3 business   days afer discharge to schedule a follow-up appointment.  2.  Recommend both metamucil one serving 1-2 times daily (must be taken   with a lot liquids) and also miralax 1-2 times daily.  3.  Calmoseptine to bottom as a barrier cream  4.  Tuck gauze between buttocks to help absorb any drainage    Appointments on [place] and/or [facility] (with [facility] or [facility]   provider or service). Call [phone] if you haven't heard regarding   these appointments within 7 days of discharge.         Adult [facility] Follow-up and recommended labs and tests      Follow up with primary care provider, Crissy Madrigal, within 7 days   for hospital follow- up.  The following labs/tests are recommended: CBC,   BMP.    Follow up with Colorectal Surgery per their recommendations.  Follow up with Urology per their recommendations.     Appointments on [place] and/or [facility] (with [facility] or [facility]   provider or service). Call [phone] if you haven't heard regarding   these appointments within 7 days of discharge.             Unresulted Labs Ordered in the Past 30 Days of this Admission     Date and Time Order Name Status Description    8/9/2022 11:01 PM Urine Culture Preliminary       These results will be followed up by inpatient team, will contact patient if positive.     Discharge Disposition   Discharged to home  Condition at discharge: Stable    Hospital Course   Hilaria Bonner was admitted on 7/26/2022 for hemorrhagic shock.  The following problems were addressed during her hospitalization:    #Rectal bleeding s/p mixed hemorrhoidectomy   #Hypovolemic shock 2/2 blood loss - resolved   P/w 3 weeks of hematochezia with associated lightheadedness and fatigue while on Xarelto. Hypotensive with improvement after repeat LR boluses and never required pressors but admitted initially to  ICU. Baseline hgb 9-10. On arrival, Hgb 6.4, improved to 7.8 after 2 U pRBC. CTA abd/pelvis negative for active extravasation. Infectious stool panel negative. EGD/Colonoscopy performed 7/28 without evidence of acute bleed but noted presence of hemorrhoids. Hgb continued to wax and wane between 7-9 w/ ongoing bloody stools. Colorectal surgery consulted and ultimately hemorrhoidectomy performed 8/3 with subsequent improvement in rectal bleeding. Rectal pain controlled continued to be an issue throughout admission  -Colorectal surgery recs on discharge:               -Miralax for soft stools. Continue metamucil.               -Continue sitz baths 3-4x daily and tuck 4x4 gauze    -F/u in CRS clinic as detailed above  -Recommend repeating CBC in 1 week with PCP      #Acute on chronic macrocytic anemia - stable   #Reticulocytosis   Baseline Hgb ~9-10 since 2019 (appears to be normal years prior) w/ MCV ~100. Likely 2/2 alcohol use disorder vs drug induced macrocytosis (is on chronic folate/B12 supplementation and both folate/B12 levels have been normal). Hgb 6.4 on initial ICU admission w/ elevated reticulocyte count consistent with anemia from hemorrhoidal bleeding as discussed above. Hgb dropped to 6.8-->6.9 on 8/5 (< 48 hours s/p hemorrhoidectomy). Received 1 unit pRBCs. Hgb subsequently stable in mid-7 range for several days prior to discharge.   -Repeat CBC in 1 week with PCP      #Urinary incontinence   #Acute urinary retention, resolved  400 ml of urine retention post-hemorrhoidectomy, likely 2/2 inflammation/swelling from procedure vs pta prazosin being held 2/2 hypotension. Indwelling dougherty placed by urology-> now removed. For 3 days prior to discharge pt had a few episodes of incontinence which were distressing for her.Urology was consulted and bladder scans reviewed--no c/f overflow incontinence. UA obtained, clearly contaminated. Attempted to obtain 2nd UA via straight cath prior to discharge but patient was  not amenable. Suspect sx 2/2 UTI vs urge incontinence related to recent catheterization and surgery.   - ultimately elected to treat for simple cystitis with bactrim x3 days  - urology to set up outpatient follow-up - can consider Mybetriq at that time  - prazosin was continued on discharge (despite concern for increasing incontinence) because this is the only medication that works for her nightmares    #Hx of PE c/b PEA arrest in 2018  PTA Xarelto held on arrival 2/2 concern for GI bleed. Started on apixaban 7/28 after bleed not found on EGD/C--discussed with hematology who agreed with DOAC switch. Hgb remained stable after restarting. BLE US here without evidence of DVT.   - continue apixaban 5 mg BID    #Pitting BLE edema - improving   Chronic bilateral LE edema for which she had been on HCTZ PTA. This was held in setting of presentation with hemorrhagic shock. Unclear etiology for LE edema. Inpatient w/u: TTE wnl. non-nephrotic range proteinuria on UA, known hx hepatic steatosis, no cirrhosis on imaging, BLE US negative for DVTs 8/1. Attempted restarting lasix which she initially tolerated well however had episode of hypertension with lightheadedness and bedside US without significant evidence IVC dilation, thus lasix continued to be held. Swelling improved with lymphedema wraps.   - Lymphedema wraps to bilateral LEs     #Acute on chronic sinus tachycardia - stable  Resting -115 with intermittent acceleration with ambulation. Says that this has been normal for her since her PE. Pt denies SOB, chest pain, or palpitations.  Sinus rhythm on cardiac monitoring x2 days--now stopped. No evidence for infection on exam and white count normal. Continues to be satting well on room air. Tachycardia improved to low 100's after IV hydration. Suspect tachycardia 2/2 dehydration in the setting of ARAMIS, hypotension, and lactic acidosis vs an autonomic dysfunction.     #ARAMIS - resolved  Pt presented with creatinine elevated  to 2.18 on admission with baseline at 0.7-0.8. Likely due to prerenal hypotension in setting of rectal bleed vs. dehydration. Improved with pRBC and fluid resuscitation.      #Lactic acidosis, resolved  Bicarb improved with volume resuscitation. Suspect contribution of elevated lactate (6/7 on admit, now down to 2.2), hypotension (resolving) and anemia (transfused).     #Mouth pain   C/o tongue pain over the past few days. No relief with magic mouthwash. Also has cracking/pain on corners of mouth. Could be due to dry air vs vitamin deficiency vs medication induced.  -B2 level wnl  -Biotene mouth wash 4x daily PRN  -Vaseline for lips/corners of mouth        #Hypomagnesemia - resolved   Responded to repletion.      #Trichomonas Vaginalis- treatment completed 8/9  Called by PCP 8/3 to report that pt's recent PAP smear came back positive for trichomonas. Pt denied any sxs.   -Completed Flagyl 500 mg BID x 7 day course     Chronic Medical Problems:     #Chronic peripheral neuropathy  -PTA Pregabalin      #Type 2 diabetes  A1c on admission 5.4. Glucose in low 100s this admission.   -Resume PTA metformin     #Chronic anxiety  #Nightmares  -Continue PTA Venlafaxine  -restarted Zyprexa   -restarted prazosin     #Obstructive Sleep Apnea  -CPAP at night     #Chronic hypertension  -Hold PTA Lisinopril and hydrochlorothiazide while normotensive and intermittently hypotensive.      #R foot injury   Tripped and fell from standing height on 6/29, inverting her R ankle. Seen at [facility] in [place] 6/30. R ankle XRs negative; R foot XRs with ? Calcaneal fracture at the calcaneal cuboid joint. Given CAM boot, which she has been wearing since. Continued to have R foot pain on admission. Repeat R foot XR without calcaneal fracture but there are ossific fragment projections over the proximal second metatarsal. No further workup or intervention recommended, per Ortho.   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 Appears pt has been following with neurology. Sx onset April 2020 4 weeks post-covid. NCS in 7/20 showed absent sensory responses in the upper and lower limbs and normal motor responses. Has been on maintenance IVIG q3 weeks (recently decreased to [id number]) since then and sx managed well with this per last neuro note 5/9/22.  -IVIG infusion given 8/4  -resume outpatient infusions      #Hx of alcohol abuse  #Hx of alcohol induced pancreatitis   Used to drink 1L vodka daily. Hospitalized Feb. 2021 for alcohol induced pancreatitis and has been sober since. Hepatosteatosis noted on CTAP 7/26/22.       Consultations This Hospital Stay   OCCUPATIONAL THERAPY ADULT IP CONSULT  PHYSICAL THERAPY ADULT IP CONSULT  GI LUMINAL ADULT IP CONSULT  NURSING TO CONSULT FOR VASCULAR ACCESS CARE IP CONSULT  CARE MANAGEMENT / SOCIAL WORK IP CONSULT  PHARMACY LIAISON FOR MEDICATION COVERAGE CONSULT  COLORECTAL SURGERY ADULT IP CONSULT  CARDIOLOGY GENERAL ADULT IP CONSULT  HEMATOLOGY ADULT IP CONSULT  LYMPHEDEMA THERAPY IP CONSULT  NURSING TO CONSULT FOR VASCULAR ACCESS CARE IP CONSULT  NURSING TO CONSULT FOR VASCULAR ACCESS CARE IP CONSULT  UROLOGY IP CONSULT    Code Status   Full Code       The patient was discussed with Dr. Florence.     MD Audelia Barrientos[address] [address]  Phone: [phone]  ______________________________________________________________________    Physical Exam   Vital Signs: Temp: 98.2  F (36.8  C) Temp src: Oral BP: 111/71 Pulse: 95   Resp: 16 SpO2: 99 % O2 Device: None (Room air)    Weight: 319 lbs 3.2 oz    General Appearance:  No acute distress   Respiratory: clear lung sounds, no increased work of breathing  Cardiovascular: regular rate and rhythm, no murmur   GI: Non distended. Soft, non tender  Extremities: 1+ pitting edema in  extremities   Other:  Alert and awake       Primary Care Physician   Crissy Madrigal    Discharge Orders      Medication Therapy Management Referral      Lymphedema Therapy Referral      Physical Therapy Referral      Primary Care - Care Coordination Referral      Reason for your hospital stay    8/3/2022:  ANESTHESIA:  General endotracheal anesthesia plus local anesthetic.     PROCEDURES PERFORMED:     1.  LigaSure hemorrhoidectomy x 3.  2.  Proctoplasty.  3.  Anoplasty.     Adult [facility] Follow-up and recommended labs and tests    COLON AND RECTAL SURGERY DISCHARGE RECOMMENDATION:     Anorectal Surgery Instructions    What can I expect after anorectal surgery?  Most anorectal procedures are done as outpatient surgery, and you go home the same day as the procedure. A few surgical procedures will require that you stay in the hospital for about one to three days. No matter where the procedure is done or how long or short it takes, these recommendations will help you heal and feel more comfortable.    Medicines:  The anal area is very sensitive; you can expect to have some pain for up to 2-4 weeks after the procedure. Your doctor will give you a prescription for one or more pain medications.      Take acetaminophen (Tylenol) 650-1000 mg four times a day.   Take this on a regular basis (not as needed) following surgery for pain control.   Take the lower dose if you are >65 years old or have liver disease. The maximum dose of acetaminophen is 4000 mg a day. You can stop the acetaminophen or reduce the dose when you are feeling better.  It is important to realize that many narcotic pain relievers (including vicodin, percocet, tylenol #3) also have acetaminophen, and excessive doses of acetaminophen can be dangerous, so do not take these in addition to acetaminophen.  You may take narcotics that don't contain acetaminophen such as oxycodone.      Take oxycodone AS NEEDED in addition to the acetominaphen   Because  narcotics have side effects (including constipation), you should reduce your use of these medications as tolerated as your pain improves.    *In general, the best strategy is to take (if you are able to tolerate it) the tylenol on a regular basis until your pain has largely gone away. You can take the narcotic pain medicine as needed in addition to the tylenol. As your pain begins to lessen, you should cut back on your narcotic use while continuing to take your regular tylenol doses.    Refilling prescriptions. If you need additional pain medication, please call the triage nurse at [phone] during normal business hours (8 a.m. to 4 p.m., Monday though Friday).  If you call after hours or on the weekends, the doctor on call may not know you personally and may not renew narcotic pain medication by phone. Call your primary care provider for all other medication refills.    Perineal care:  Tub baths:  If possible, take a tub bath immediately after each bowel movement.   Baths should be take at least 3 times daily for the first week to 10 days following your procedure. You should soak in the tub for 10 to 15 minutes each time with water as warm as you can tolerate.   Even after you go back to work, it is a good idea to sit in the tub in the morning, after returning from work, and again in the evening before bedtime.    Bleeding/Infection:  You can expect to have some bleeding after bowel movements, but it should stop soon after you wipe.   Use a wet cloth or perianal pad (Tucks or Preparation H pads) to gently wipe the area after each bowel movement.  Do not rub the anal area or use a lot of pressure.  Using a spray bottle filled with warm water helps loosen any remaining stool. Blot gently with a soft dry cloth or tissue paper.  Infection around the anal opening is not very common. The anal area has excellent blood supply, which helps the area to heal. Bloody discharge after bowel movements is normal and may last 2  to 4 weeks after your surgery. However, if you bleed between bowel movements and cannot get it to stop, call the triage nurse immediately [phone].    Bowel function:  Take a fiber supplement such as Metamucil or Citrucel, which is over the counter. It is important to drink six to eight glasses of water or juice everyday when using fiber products.    If you do not have a bowel movement after 1-2 days:  Take Milk of Magnesia-2 tablespoons.     If there are no results, repeat this or add over the counter Miralax.    If you still do not have results, contact the clinic.   If there are no results, repeat this. Stop taking Milk of Magnesia or other laxatives if you begin to have diarrhea.    * Constipation will cause you to strain when you have a bowel movement. The hard stool will be difficult to pass, will increase pain and bleeding, and will slow down healing.  Try to avoid constipation and/or diarrhea as this can make the pain and bleeding worse.    * It is important to have regular bowel movements at least every other day and to keep your stool soft.  A high fiber diet, including at least four servings of fruits or vegetables daily, will help to keep your bowel movements regular and soft.    Activity:  After your procedure, there are no restrictions on your activity   except restrictions surrounding being on narcotics and in pain, such as no heavy machine operating or driving.   You may walk, climb stairs, ride in a car, and sit as tolerated.   It is helpful to avoid sitting in one position for long periods (2 or more hours).  After some surgeries, you may be told not to perform any lifting (more than 10 pounds) for several weeks after surgery.    When to call:  When do I need to call the doctor or triage nurse?  If you experience any of the problems listed here, call our triage nurse during business hours ([phone]).   The nurse will help you with your problem or have the doctor call you.   After hours and  on weekends, please call the main hospital number ([phone]) and ask for the colon and rectal surgery person on call.   Some is available to help you 24 hours a day, seven days a week.  Call for:  -  Fever greater than 101 degrees  -  Chills  -  Foul-smelling drainage  -  Nausea and vomiting  -  Diarrhea - greater than 3 water stools in 24 hours  -  Constipation - no bowel movement after 3 days  -  Severe bleeding that does not stop soon after a bowel movement  -  Problems with the incision, including increased pain, swelling, or redness    NOTIFY  Please contact Yessy Mejia RN or Janki Thrasher RN at [phone] for problems after discharge such as:  -Temperature > 101F, chills, rigors, dizziness  -Redness around or purulent drainage from wound  -Inability to tolerate diet, nausea or vomiting  -You stop passing gas (or your stoma stops functioning), develop significant bloating, abdominal pain  -Have blood in stools/vomit  -Have severe diarrhea/constipation  -Any other questions or concerns.  - At nights (after 4:30pm), on weekends, or if urgent, call [phone] and ask the  to speak with the on-call Colorectal Surgery resident or fellow      FOLLOW-UP  1.  You will need to follow-up with Nancy Davenport NP in the Colon and Rectal Surgery clinic in 2-3 week(s) and then with CRS Staff: Dr. Chip Way in 4-6 weeks after.  Please contact our Nurses (phone # [phone]) if you have not heard from our clinic in 3 business days afer discharge to schedule a follow-up appointment.  2.  Recommend both metamucil one serving 1-2 times daily (must be taken with a lot liquids) and also miralax 1-2 times daily.  3.  Calmoseptine to bottom as a barrier cream  4.  Tuck gauze between buttocks to help absorb any drainage    Appointments on [place] and/or [facility] (with [facility] or [facility] provider or service). Call [phone] if you haven't heard regarding these appointments within 7 days of  discharge.     Activity    Your activity upon discharge: activity as tolerated     Reason for your hospital stay    You were admitted to the hospital with blood in your stool. You had a colonoscopy that showed hemorrhoids that were causing the blood. You had a procedure done to remove the hemorrhoids. Your blood levels have been stable since the procedure. Since your procedure, you have been having episodes of incontinence. Your urine showed some bacteria and we will treat you with an antibiotic. You will follow up with urology as an outpatient if your symptoms remain.     Adult [facility] Follow-up and recommended labs and tests    Follow up with primary care provider, Crissy Madrigal, within 7 days for hospital follow- up.  The following labs/tests are recommended: CBC, BMP.    Follow up with Colorectal Surgery per their recommendations.  Follow up with Urology per their recommendations.     Appointments on [place] and/or [facility] (with [facility] or [facility] provider or service). Call [phone] if you haven't heard regarding these appointments within 7 days of discharge.     Diet    Follow this diet upon discharge: Orders Placed This Encounter      Regular Diet Adult       Significant Results and Procedures   Results for orders placed or performed during the hospital encounter of 07/26/22   CTA Abdomen Pelvis with Contrast    Narrative    CTA ABDOMEN AND PELVIS 7/26/2022 3:45 PM    CLINICAL HISTORY: GI bleed with hypotension.     COMPARISONS: CT 11/5/2020. Thoracic spine MR 9/29/2021.    REFERRING PROVIDER: EVARISTO ZAMAN    TECHNIQUE: Unenhanced CT performed through the abdomen and pelvis.  Contrast administered and patient reported discomfort. Contrast  stopped after 31.8 mL administered. No infiltration or extravasation  was noted by the technologist. Other arm was used and contrast was  administered without complaint. Following the uneventful  administration of intravenous contrast, somewhat delayed CTA  performed  through the abdomen and pelvis. After an 80 second delay, CT repeated  through the abdomen and pelvis. Coronal and sagittal reconstructions  produced. Coronal MIP reconstructions produced.    3D and multiplanar reconstructions were unavailable at the time of  dictation.    CONTRAST: 131.8 mL Isovue 370    DOSE (DLP): 2106 mGy*cm    FINDINGS: CTA: No active extravasation demonstrated.    Aorta patent without stenosis, dissection, or aneurysm. Celiac,  superior mesenteric, bilateral single renal, and inferior mesenteric  arteries patent. Bilateral common, internal, and external iliac  arteries patent. Bilateral common femoral arteries patent.    Bilateral common femoral veins patent. Bilateral common, internal, and  external iliac veins patent. Inferior vena cava patent. Renal and  hepatic veins patent.    Splenic, superior mesenteric, and portal veins patent.    CT: Lung bases clear.    Hepatosteatosis.    Sleeve gastrectomy postoperative changes. Bowel decompressed.    Gallbladder sludge suggested. No pericholecystic fluid.    T9 compression fracture, similar to previous.      Impression    IMPRESSION: No active extravasation demonstrated.    JACOB LOMAX MD         SYSTEM ID:  [id number]   XR Foot Right G/E 3 Views    Narrative    3 views right foot radiographs 7/27/2022 4:15 PM    History: reports history R foot fracture, cannot access records     Comparison: 12/29/2021    Findings:    Nonweightbearing AP, oblique, and lateral  views of the right foot  were obtained.     Ossific fragment projects over the proximal second metatarsal on the  oblique view.    Lisfranc articulation alignment is congruent on this non-weight  bearing study.    Mild degenerative changes of first metatarsophalangeal joint. Plantar  calcaneal enthesopathy.    Dorsal soft tissue prominence.      Impression    Impression:  Ossific fragment projects of the proximal second metatarsal on the  oblique view. This is of uncertain acuity or  etiology but appears new  since 2021.    YASMIN STAPLES MD (Joe)         SYSTEM ID:  [id number]    Lower Extremity Venous Duplex Bilateral    Narrative    EXAMINATION: DOPPLER VENOUS ULTRASOUND OF BILATERAL LOWER EXTREMITIES,  2022 11:26 AM     COMPARISON: Ultrasound of 2020.    HISTORY: LE swelling and h/o PE    TECHNIQUE:  Gray-scale evaluation with compression, spectral flow and  color Doppler assessment of the deep venous system of both legs from  groin to knee, and then at the ankles.    FINDINGS:  In both lower extremities, the common femoral, femoral, popliteal and  posterior tibial veins demonstrate normal compressibility and blood  flow.    Subcutaneous edema to the level of the calfs bilaterally.      Impression    IMPRESSION:  1.  No evidence of deep venous thrombosis in either lower extremity.  2.  Subcutaneous edema to the level of the calfs bilaterally.    I have personally reviewed the examination and initial interpretation  and I agree with the findings.    CHUCK TRAVIS MD         SYSTEM ID:  [id number]   Echo Complete     Value    LVEF  55-60%    Narrative    [id number]  [id number]  [id number]  [id number]^DUARTE^GRACIELA     [facility]ch[facility]  [address] [place] [id number]     Name: RADHA JOHANSEN  MRN: [id number]  : 1990  Study Date: 2022 07:54 AM  Age: 31 yrs  Gender: Female  [place] [facility] [place]  Reason For Study: Shock  Ordering Physician: GRACIELA RAMACHANDRAN  Performed By: Rebekah Mayer LISA     BSA: 2.3 m2  Height: 66 in  Weight: 275 lb  HR: 91  BP: 88/62 mmHg  ______________________________________________________________________________  Procedure  Complete Portable Echo Adult. Technically difficult study.  ______________________________________________________________________________  Interpretation Summary  Global and regional left ventricular function is normal with an EF of 55-60%.  Right  ventricular function, chamber size, wall motion, and thickness are  normal.  Pulmonary artery systolic pressure cannot be assessed.  The inferior vena cava cannot be assessed.  No pericardial effusion is present.  There has been no change.  ______________________________________________________________________________  Left Ventricle  Global and regional left ventricular function is normal with an EF of 55-60%.  Left ventricular wall thickness is normal. Left ventricular size is normal.  Left ventricular diastolic function is normal. No regional wall motion  abnormalities are seen.     Right Ventricle  Right ventricular function, chamber size, wall motion, and thickness are  normal.     Atria  Both atria appear normal. The atrial septum is intact as assessed by color  Doppler .     Mitral Valve  The mitral valve is normal.     Aortic Valve  The valve leaflets are not well visualized. On Doppler interrogation, there is  no significant stenosis or regurgitation.     Tricuspid Valve  The tricuspid valve is normal. Pulmonary artery systolic pressure cannot be  assessed.     Pulmonic Valve  The valve leaflets are not well visualized. On Doppler interrogation, there is  no significant stenosis or regurgitation.     Vessels  The thoracic aorta is normal. The pulmonary artery and bifurcation cannot be  assessed. The inferior vena cava cannot be assessed.     Pericardium  No pericardial effusion is present.     Compared to Previous Study  There has been no change.  ______________________________________________________________________________  MMode/2D Measurements & Calculations     RVDd: 3.8 cm  IVSd: 0.85 cm  LVIDd: 4.1 cm  LVIDs: 3.0 cm  LVPWd: 0.99 cm  FS: 27.3 %  LV mass(C)d: 119.2 grams  LV mass(C)dI: 52.0 grams/m2  asc Aorta Diam: 2.8 cm  LVOT diam: 2.2 cm  LVOT area: 3.9 cm2  LA Volume Index (BP): 26.0 ml/m2  RWT: 0.48  TAPSE: 2.9 cm     Doppler Measurements & Calculations  MV E max jazmyne: 100.3 cm/sec  MV A max jazmyne:  67.6 cm/sec  MV E/A: 1.5  MV dec slope: 597.7 cm/sec2  MV dec time: 0.17 sec  E/E' av.3  Lateral E/e': 6.5  Medial E/e': 8.1     ______________________________________________________________________________  Report approved by: Zaid Shipman 2022 08:35 AM           *Note: Due to a large number of results and/or encounters for the requested time period, some results have not been displayed. A complete set of results can be found in Results Review.       Discharge Medications   Discharge Medication List as of 2022  5:25 PM      START taking these medications    Details   apixaban ANTICOAGULANT (ELIQUIS) 5 MG tablet Take 1 tablet (5 mg) by mouth 2 times daily, Disp-60 tablet, R-0, E-Prescribe      HYDROmorphone (DILAUDID) 2 MG tablet Take 1-2 tablets (2-4 mg) by mouth every 6 hours as needed for pain, Disp-10 tablet, R-0, E-Prescribe      sulfamethoxazole-trimethoprim (BACTRIM DS) 800-160 MG tablet Take 1 tablet by mouth 2 times daily, Disp-6 tablet, R-0, E-Prescribe         CONTINUE these medications which have NOT CHANGED    Details   alcohol swab prep pads Use to swab area of injection/luke as directed.Disp-100 each, [id number]      ammonium lactate (AMLACTIN) 12 % external cream Apply topically 2 times dailyDisp-385 g, [id number]      Biotin 5000 MCG TABS Take 1 tablet by mouth daily, Historical      blood glucose (NO BRAND SPECIFIED) test strip Use to test blood sugar 1 times daily or as directed. To accompany: Blood Glucose Monitor Brands: per insurance., Disp-100 strip, R-11, E-Prescribe      blood glucose calibration (NO BRAND SPECIFIED) solution To accompany: Blood Glucose Monitor Brands: per insurance., Disp-100 each, R-11, E-Prescribe      Blood Glucose Monitoring Suppl (ACCU-CHEK GUIDE) w/Device KIT USE TO TEST BLOOD SUGAR DAILY, Disp-1 kit, R-0, E-Prescribe      calcium Citrate-vitamin D 500-400 MG-UNIT CHEW Take 1 chew tab by mouth 3 times daily, Disp-90 tablet, R-11,  E-Prescribe      diphenhydrAMINE (BENADRYL) 50 MG capsule Take 50 mg by mouth every 6 hours as needed for allergies or other (prior to infusion), Historical      EPINEPHrine (ANY BX GENERIC EQUIV) 0.3 MG/0.3ML injection 2-pack Historical      erythromycin (ROMYCIN) 5 MG/GM ophthalmic ointment Place 0.5 inches into both eyes 2 times dailyDisp-3.5 g, [id number]      folic acid (FOLVITE) 1 MG tablet Take 1 tablet (1 mg) by mouth daily, Disp-90 tablet, R-3, E-Prescribe      hydrOXYzine (ATARAX) 25 MG tablet Take 1 tablet (25 mg) by mouth every 6 hours as needed for anxiety, Disp-180 tablet, R-1, E-Prescribe      medroxyPROGESTERone (DEPO-PROVERA) 150 MG/ML IM injection Inject 150 mg into the muscle every 3 months, Historical      metFORMIN (GLUCOPHAGE XR) 500 MG 24 hr tablet TAKE 1 TABLET(500 MG) BY MOUTH TWICE DAILY WITH MEALS, Disp-120 tablet, R-0, E-Prescribe      methocarbamol (ROBAXIN) 500 MG tablet Take 1-2 tablets (500-1,000 mg) by mouth 3 times daily as needed for muscle spasms, Disp-75 tablet, R-1, E-Prescribe      Multiple Vitamins-Minerals (MULTIVITAMIN ADULT) CHEW Take 1 chew tab by mouth 2 times daily, Disp-60 tablet, R-11, E-Prescribe      omeprazole (PRILOSEC) 20 MG DR capsule Take 1 capsule (20 mg) by mouth 2 times daily, Disp-180 capsule, R-3, E-PrescribeNew increased dose.      ondansetron (ZOFRAN-ODT) 4 MG ODT tab DISSOLVE 1 TABLET(4 MG) ON THE TONGUE EVERY 8 HOURS AS NEEDED FOR NAUSEA, Disp-30 tablet, R-1, E-Prescribe      polyethylene glycol (MIRALAX) 17 GM/SCOOP powder Take 17 g (1 capful) by mouth daily, Disp-850 g, R-3, OTC      prazosin (MINIPRESS) 1 MG capsule TAKE 2 CAPSULES(2 MG) BY MOUTH AT BEDTIME, Disp-60 capsule, R-2, E-Prescribe      Pregabalin (LYRICA) 200 MG capsule Take 1 capsule (200 mg) by mouth 3 times daily, Disp-270 capsule, R-0, E-Prescribe      PRIVIGEN 20 GM/200ML SOLN Inject 20 g into the vein every 28 days For total dose of 60 g, Ravi CLEMONS      PRIVIGEN 40 GM/400ML  SOLN Inject 40 g into the vein every 28 days For total dose of 60 g, DEONTE, Historical      SOLU-CORTEF 100 MG injection Inject 100 mg into the muscle every 30 days, DEONTE, Historical      thin (NO BRAND SPECIFIED) lancets Use with lanceting device. To accompany: Blood Glucose Monitor Brands: per insurance., Disp-100 each, R-11, E-Prescribe      venlafaxine (EFFEXOR XR) 75 MG 24 hr capsule TAKE 3 CAPSULES(225 MG) BY MOUTH DAILY, Disp-90 capsule, R-3, E-Prescribe      vitamin B-Complex Take 1 tablet by mouth daily, Disp-90 tablet, R-3, E-Prescribe      vitamin C (ASCORBIC ACID) 1000 MG TABS Take 1 tablet (1,000 mg) by mouth daily, Disp-90 tablet, R-3, E-Prescribe      vitamin D3 (CHOLECALCIFEROL) 50 mcg (2000 units) tablet Take 1 tablet (50 mcg) by mouth daily, Disp-90 tablet, R-3, E-Prescribe      OLANZapine (ZYPREXA) 10 MG tablet TAKE 1 TABLET(10 MG) BY MOUTH AT BEDTIME, Disp-30 tablet, R-3, E-Prescribe      potassium chloride ER (K-TAB/KLOR-CON) 10 MEQ CR tablet TAKE 2 TABLETS(20 MEQ) BY MOUTH TWICE DAILY, Disp-120 tablet, R-0, E-Prescribe         STOP taking these medications       hydrochlorothiazide (HYDRODIURIL) 12.5 MG tablet Comments:   Reason for Stopping:         lisinopril (ZESTRIL) 10 MG tablet Comments:   Reason for Stopping:         potassium chloride ER (KLOR-CON M) 20 MEQ CR tablet Comments:   Reason for Stopping:         XARELTO ANTICOAGULANT 20 MG TABS tablet Comments:   Reason for Stopping:             Allergies   No Known Allergies

## 2022-08-12 NOTE — PLAN OF CARE
Occupational Therapy Discharge Summary    Reason for therapy discharge:    Discharged to home with outpatient therapy.    Progress towards therapy goal(s). See goals on Care Plan in [facility] electronic health record for goal details.  Goals partially met.  Barriers to achieving goals:   discharge from facility.    Therapy recommendation(s):    OP lymphedema PRN

## 2022-08-12 NOTE — TELEPHONE ENCOUNTER
[facility] Call Center    Phone Message    May a detailed message be left on voicemail: yes     Reason for Call: Other: Patient is to be seen by  within 7 days. Please triage and call patient to schedule.      Action Taken: Message routed to:  [facility] ([facility] SWEETIE     Travel Screening: Not Applicable

## 2022-08-12 NOTE — PROGRESS NOTES
Discharge    D: Orders for discharge and outpatient medications written.    I: Home medications and return to clinic schedule reviewed with patient. Discharge instructions and parameters for calling Health Care Provider reviewed. Patient left at 1845 accompanied by transport aide.     A: Patient/family verbalized understanding and was ready for discharge.     P: Patient instructed to  medications at [facility] (preferred Pharmacy choice). Thoroughly went over discharge information regarding care for rectal cares, when to follow-up with Colorectal, Urology and PCP and importance to schedule a lab to check BMP and CBC. Hilaria also knows to use Tylenol before PO Dilaudid as directed.     She will follow up as scheduled per AVS.

## 2022-08-15 ENCOUNTER — HOME INFUSION (PRE-WILLOW HOME INFUSION) (OUTPATIENT)
Dept: PHARMACY | Facility: CLINIC | Age: 32
End: 2022-08-15

## 2022-08-15 NOTE — PROGRESS NOTES
Clinic Care Coordination Contact    CC RN called patient for for post hospital discharge follow up, and patient reported she is at [facility] for a fall and generalized weakness.     CC RN will monitor the external discharge report for patients discharge, and follow up per workflow.     Erum Soto RN, BSN, PHN Care Coordinator  [place], [place], and Jaclyn L[place]pez   Phone: [phone]

## 2022-08-15 NOTE — PROGRESS NOTES
This is a recent snapshot of the patient's [facility] medical record.  For current drug dose and complete information and questions, call [phone] or In Basket pool, fv home infusion ([id number])  CSN Number:  [id number]

## 2022-08-17 NOTE — PROGRESS NOTES
This is a recent snapshot of the patient's [facility] medical record.  For current drug dose and complete information and questions, call [phone] or In Basket pool, fv home infusion ([id number])  CSN Number:  [id number]

## 2022-08-17 NOTE — PROGRESS NOTES
Clinic Care Coordination Contact     Patient continues to be IP     CC RN will monitor the external discharge report for patients discharge, and follow up per workflow.      Erum Soto RN, BSN, PHN Care Coordinator  [place], [place], and Jaclyn López   Phone: [phone]

## 2022-08-18 ENCOUNTER — PATIENT OUTREACH (OUTPATIENT)
Dept: CARE COORDINATION | Facility: CLINIC | Age: 32
End: 2022-08-18

## 2022-08-22 ENCOUNTER — DOCUMENTATION ONLY (OUTPATIENT)
Dept: PSYCHOLOGY | Facility: CLINIC | Age: 32
End: 2022-08-22

## 2022-08-22 NOTE — PROGRESS NOTES
Clinic Care Coordination Contact    Patient continues to be IP.     Per recent MD notes:   Initial plan was to discharge to TCU on 8/17, but she incidentally tested positive for COVID-19 and thus remains hospitalized.    Plan: CC RN will monitor the external discharge report for patients discharge, and follow up per workflow.      Erum Soto RN, BSN, PHN Care Coordinator  [place], [place], and Jaclyn López   Phone: [phone]

## 2022-08-22 NOTE — PROGRESS NOTES
No show for the patient. Second no show. Writer to discharge should the patient fails to reach out to reschedule within 2 days.

## 2022-08-23 NOTE — PROGRESS NOTES
This is a recent snapshot of the patient's [facility] medical record.  For current drug dose and complete information and questions, call [phone] or In Basket pool, fv home infusion ([id number])  CSN Number:  [id number]

## 2022-08-25 NOTE — PROGRESS NOTES
Clinic Care Coordination - Chart Review Only    Situation/Background: Patient chart reviewed by care coordinator related to [facility] conversion.    Assessment: Patient continues to be followed by Clinic Care Coordination.    Plan: Patient's chart updated to align with [facility] program for ongoing patient management.    Erum Soto RN, BSN, PHN Care Coordinator  [place], [place], and Jaclyn L[place]pez   Phone: [phone]

## 2022-08-29 ENCOUNTER — PATIENT OUTREACH (OUTPATIENT)
Dept: CARE COORDINATION | Facility: CLINIC | Age: 32
End: 2022-08-29

## 2022-08-29 NOTE — PROGRESS NOTES
Clinic Care Coordination Contact  Care Coordination Transition Communication    Clinical Data: Patient was hospitalized at [facility] from 8/12/22 to 8/27/22 with diagnosis of Generalized weakness (Primary Dx);   Lower extremity edema;   Urge incontinence;   CIDP (chronic inflammatory demyelinating polyneuropathy) (HC);   Anxiety;   Type 2 diabetes mellitus without complication, unspecified whether long term insulin use (HC);   Preventative health care;   History of PEA arrest due to PE.     Transition to Facility:              Facility Name: [facility]              Contact name and phone number/fax: RUBÉN Orozco at  [phone]    Plan: RN/SW Care Coordinator will await notification from facility staff informing RN/SW Care Coordinator of patient's discharge plans/needs. RN/SW Care Coordinator will review chart and outreach to facility staff every 4 weeks and as needed.     Erum Soto RN, BSN, PHN Care Coordinator  [place] and Jaclyn López   Phone: [phone]

## 2022-08-30 ENCOUNTER — HOME INFUSION (PRE-WILLOW HOME INFUSION) (OUTPATIENT)
Dept: PHARMACY | Facility: CLINIC | Age: 32
End: 2022-08-30

## 2022-09-10 ENCOUNTER — LAB REQUISITION (OUTPATIENT)
Dept: LAB | Facility: CLINIC | Age: 32
End: 2022-09-10
Payer: COMMERCIAL

## 2022-09-10 DIAGNOSIS — D64.9 ANEMIA, UNSPECIFIED: ICD-10-CM

## 2022-09-10 DIAGNOSIS — Z13.228 ENCOUNTER FOR SCREENING FOR OTHER METABOLIC DISORDERS: ICD-10-CM

## 2022-09-11 ENCOUNTER — HEALTH MAINTENANCE LETTER (OUTPATIENT)
Age: 32
End: 2022-09-11

## 2022-09-12 LAB
ALBUMIN SERPL BCG-MCNC: 2.5 G/DL (ref 3.5–5.2)
ALP SERPL-CCNC: 198 U/L (ref 35–104)
ALT SERPL W P-5'-P-CCNC: 9 U/L (ref 10–35)
ANION GAP SERPL CALCULATED.3IONS-SCNC: 9 MMOL/L (ref 7–15)
AST SERPL W P-5'-P-CCNC: 30 U/L (ref 10–35)
BASOPHILS # BLD AUTO: 0.1 10E3/UL (ref 0–0.2)
BASOPHILS NFR BLD AUTO: 1 %
BILIRUB SERPL-MCNC: 0.4 MG/DL
BUN SERPL-MCNC: 4.7 MG/DL (ref 6–20)
CALCIUM SERPL-MCNC: 7.9 MG/DL (ref 8.6–10)
CHLORIDE SERPL-SCNC: 106 MMOL/L (ref 98–107)
CREAT SERPL-MCNC: 0.7 MG/DL (ref 0.51–0.95)
DEPRECATED HCO3 PLAS-SCNC: 29 MMOL/L (ref 22–29)
EOSINOPHIL # BLD AUTO: 0.2 10E3/UL (ref 0–0.7)
EOSINOPHIL NFR BLD AUTO: 3 %
ERYTHROCYTE [DISTWIDTH] IN BLOOD BY AUTOMATED COUNT: 14.5 % (ref 10–15)
GFR SERPL CREATININE-BSD FRML MDRD: >90 ML/MIN/1.73M2
GLUCOSE SERPL-MCNC: 77 MG/DL (ref 70–99)
HCT VFR BLD AUTO: 33.2 % (ref 35–47)
HGB BLD-MCNC: 10.2 G/DL (ref 11.7–15.7)
IMM GRANULOCYTES # BLD: 0 10E3/UL
IMM GRANULOCYTES NFR BLD: 0 %
LYMPHOCYTES # BLD AUTO: 3.1 10E3/UL (ref 0.8–5.3)
LYMPHOCYTES NFR BLD AUTO: 42 %
MCH RBC QN AUTO: 31.9 PG (ref 26.5–33)
MCHC RBC AUTO-ENTMCNC: 30.7 G/DL (ref 31.5–36.5)
MCV RBC AUTO: 104 FL (ref 78–100)
MONOCYTES # BLD AUTO: 0.8 10E3/UL (ref 0–1.3)
MONOCYTES NFR BLD AUTO: 11 %
NEUTROPHILS # BLD AUTO: 3.3 10E3/UL (ref 1.6–8.3)
NEUTROPHILS NFR BLD AUTO: 43 %
NRBC # BLD AUTO: 0 10E3/UL
NRBC BLD AUTO-RTO: 0 /100
PLATELET # BLD AUTO: 395 10E3/UL (ref 150–450)
POTASSIUM SERPL-SCNC: 2.8 MMOL/L (ref 3.4–5.3)
PROT SERPL-MCNC: 5.4 G/DL (ref 6.4–8.3)
RBC # BLD AUTO: 3.2 10E6/UL (ref 3.8–5.2)
SODIUM SERPL-SCNC: 144 MMOL/L (ref 136–145)
WBC # BLD AUTO: 7.5 10E3/UL (ref 4–11)

## 2022-09-12 PROCEDURE — 80053 COMPREHEN METABOLIC PANEL: CPT | Mod: ORL | Performed by: FAMILY MEDICINE

## 2022-09-12 PROCEDURE — P9603 ONE-WAY ALLOW PRORATED MILES: HCPCS | Mod: ORL | Performed by: FAMILY MEDICINE

## 2022-09-12 PROCEDURE — 36415 COLL VENOUS BLD VENIPUNCTURE: CPT | Mod: ORL | Performed by: FAMILY MEDICINE

## 2022-09-12 PROCEDURE — 85025 COMPLETE CBC W/AUTO DIFF WBC: CPT | Mod: ORL | Performed by: FAMILY MEDICINE

## 2022-09-13 ENCOUNTER — HOME INFUSION (PRE-WILLOW HOME INFUSION) (OUTPATIENT)
Dept: PHARMACY | Facility: CLINIC | Age: 32
End: 2022-09-13

## 2022-09-14 ENCOUNTER — DOCUMENTATION ONLY (OUTPATIENT)
Dept: PHARMACY | Facility: CLINIC | Age: 32
End: 2022-09-14

## 2022-09-14 ENCOUNTER — TELEPHONE (OUTPATIENT)
Dept: NEUROLOGY | Facility: CLINIC | Age: 32
End: 2022-09-14

## 2022-09-14 ENCOUNTER — HOME INFUSION (PRE-WILLOW HOME INFUSION) (OUTPATIENT)
Dept: PHARMACY | Facility: CLINIC | Age: 32
End: 2022-09-14

## 2022-09-14 DIAGNOSIS — F06.30 MOOD DISORDER IN CONDITIONS CLASSIFIED ELSEWHERE: Primary | ICD-10-CM

## 2022-09-14 NOTE — PROGRESS NOTES
Skilled Nurse visit in the Patient Home to administer Privigen 60g.  No recent elevated temperature, fever, chills, productive cough, coughing for 3 weeks or longer or hemoptysis, abnormal vital signs, night sweats, chest pain. No  decrease in your appetite, unexplained weight loss or fatigue.  No other new onset medical symptoms.  Current weight 319 lbs.  Peripheral IVleft Wrist, 1 attempt Pre medicated with *Acetaminophen 650 mg by mouth, 30 minutes prior to                        infusion.Diphenhydramine 50 mg by mouth, 30 minutes prior to infusion.   Hydrocortisone 100 mg IV push over 2-5 minutes, 30 minutes prior to infusion.    *. Infusion completed without complication or reaction. Pt reports therapy iseffective in managing symptoms related to therapy.    Love Ribera RN, BSN  [facility]  [phone]  [email or web address]

## 2022-09-14 NOTE — TELEPHONE ENCOUNTER
Reason for call:  Other   Patient called regarding (reason for call): prescription  Additional comments: Medications not working after being in the hospital, feels like she is starting over.  [facility] asked her to call if any problems with medications    Phone number to reach patient:  Cell number on file:    Telephone Information:   Mobile [phone]       Best Time:  asap    Can we leave a detailed message on this number?  YES    Travel screening: Not Applicable

## 2022-09-15 RX ORDER — OLANZAPINE 2.5 MG/1
2.5 TABLET, FILM COATED ORAL 2 TIMES DAILY PRN
Qty: 60 TABLET | Refills: 1 | Status: SHIPPED | OUTPATIENT
Start: 2022-09-15 | End: 2023-03-23

## 2022-09-15 NOTE — TELEPHONE ENCOUNTER
Provider added olanzapine 2.5mg to take twice daily as needed. Attempted to call patient but there was no answer. Left message to return call.

## 2022-09-15 NOTE — TELEPHONE ENCOUNTER
"Called patient. Patient said that \"the voices have come back.\" Patient reports visions, hearing a radio, and vivid dreams and nightmares. Patient said that this started soon after she was discharged from the hospital in August. Patient said that she is getting depressed because of her worsening symptoms. Patient reports that she is taking her medication as prescribed and consistently.     Patient is also interested in scheduling an appointment sooner that the one she has in October if possible.     Provider to advise.  "

## 2022-09-16 ENCOUNTER — OFFICE VISIT (OUTPATIENT)
Dept: FAMILY MEDICINE | Facility: CLINIC | Age: 32
End: 2022-09-16
Payer: COMMERCIAL

## 2022-09-16 VITALS
HEART RATE: 113 BPM | RESPIRATION RATE: 16 BRPM | OXYGEN SATURATION: 96 % | DIASTOLIC BLOOD PRESSURE: 84 MMHG | TEMPERATURE: 98.8 F | WEIGHT: 263 LBS | SYSTOLIC BLOOD PRESSURE: 132 MMHG | HEIGHT: 67 IN | BODY MASS INDEX: 41.28 KG/M2

## 2022-09-16 DIAGNOSIS — R20.8 SKIN PAIN: ICD-10-CM

## 2022-09-16 DIAGNOSIS — G62.9 NEUROPATHY: Primary | ICD-10-CM

## 2022-09-16 DIAGNOSIS — U09.9 POST-COVID SYNDROME: ICD-10-CM

## 2022-09-16 DIAGNOSIS — R23.4 PEELING SKIN: ICD-10-CM

## 2022-09-16 PROCEDURE — 99213 OFFICE O/P EST LOW 20 MIN: CPT | Performed by: FAMILY MEDICINE

## 2022-09-16 NOTE — PROGRESS NOTES
Subjective   Hilaria is a 31 year old accompanied by her none, presenting for the following health issues:  No chief complaint on file.      [facility]        Hospital Follow-up Visit:    Hospital/Nursing Home/IP Rehab Facility: [facility] -   Date of Admission: 9/3/2022  Date of Discharge: 9/13/2022  Reason(s) for Admission: In patient therapy     Was your hospitalization related to COVID-19? No   Problems taking medications regularly:  None  Medication changes since discharge: None  Problems adhering to non-medication therapy:  None    Summary of hospitalization:  [facility] discharge summary reviewed  Diagnostic Tests/Treatments reviewed.  Follow up needed: none  Other Healthcare Providers Involved in Patient s Care:         None  Update since discharge: improved.          Post Medication Reconciliation Status:        Plan of care communicated with patient                   Review of Systems    was in TCU  Discharged Tuesday 3 days ago    Patient is very complicated    Comes in with long list of requests    I have never met patient before          Objective    LMP  (LMP Unknown)   There is no height or weight on file to calculate BMI.  Physical Exam         Full physical not done     Mentation and affect are okay here    No tremor of speech or extremity          ASSESSMENT / PLAN:  (G62.9) Neuropathy  (primary encounter diagnosis)  Comment: This is a very complicated patient that I have never met before.  I explained I am only able to do a couple things on her list.  I also have a closed practice and am not able to take on any new patients.  I also am having to take some time off this week and next week to deal with a family emergency.  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    Jose Zepeda MD

## 2022-09-16 NOTE — TELEPHONE ENCOUNTER
Phone call to Hilaria.  Told her about the PRN olanzapine 2.5 mg tablets Dr. Torres has ordered for her.  Reviewed how to use them, she repeated directions back to me for clarity.    Dr. Torres had an open appointment on Monday, September 19 at 0830.  Hilaria took that appointment.

## 2022-09-16 NOTE — PATIENT INSTRUCTIONS
Make appointment with primary clinic asap    Call for physical therapy    Call about the walker    Schedule dermatology

## 2022-09-18 DIAGNOSIS — F29 PSYCHOSIS, UNSPECIFIED PSYCHOSIS TYPE (H): ICD-10-CM

## 2022-09-19 ENCOUNTER — VIRTUAL VISIT (OUTPATIENT)
Dept: PSYCHIATRY | Facility: CLINIC | Age: 32
End: 2022-09-19
Payer: COMMERCIAL

## 2022-09-19 ENCOUNTER — VIRTUAL VISIT (OUTPATIENT)
Dept: SLEEP MEDICINE | Facility: CLINIC | Age: 32
End: 2022-09-19

## 2022-09-19 VITALS
SYSTOLIC BLOOD PRESSURE: 132 MMHG | BODY MASS INDEX: 41.12 KG/M2 | HEIGHT: 67 IN | DIASTOLIC BLOOD PRESSURE: 84 MMHG | WEIGHT: 262 LBS

## 2022-09-19 DIAGNOSIS — G47.33 OSA (OBSTRUCTIVE SLEEP APNEA): Primary | ICD-10-CM

## 2022-09-19 DIAGNOSIS — F41.1 GAD (GENERALIZED ANXIETY DISORDER): ICD-10-CM

## 2022-09-19 PROCEDURE — 99214 OFFICE O/P EST MOD 30 MIN: CPT | Mod: 95 | Performed by: PSYCHIATRY & NEUROLOGY

## 2022-09-19 PROCEDURE — 99213 OFFICE O/P EST LOW 20 MIN: CPT | Mod: 95 | Performed by: PHYSICIAN ASSISTANT

## 2022-09-19 RX ORDER — HYDROXYZINE HYDROCHLORIDE 25 MG/1
25-50 TABLET, FILM COATED ORAL EVERY 6 HOURS PRN
Qty: 180 TABLET | Refills: 1 | Status: SHIPPED | OUTPATIENT
Start: 2022-09-19 | End: 2023-05-25

## 2022-09-19 ASSESSMENT — SLEEP AND FATIGUE QUESTIONNAIRES
HOW LIKELY ARE YOU TO NOD OFF OR FALL ASLEEP IN A CAR, WHILE STOPPED FOR A FEW MINUTES IN TRAFFIC: WOULD NEVER DOZE
HOW LIKELY ARE YOU TO NOD OFF OR FALL ASLEEP WHILE SITTING AND READING: HIGH CHANCE OF DOZING
HOW LIKELY ARE YOU TO NOD OFF OR FALL ASLEEP WHEN YOU ARE A PASSENGER IN A CAR FOR AN HOUR WITHOUT A BREAK: HIGH CHANCE OF DOZING
HOW LIKELY ARE YOU TO NOD OFF OR FALL ASLEEP WHILE LYING DOWN TO REST IN THE AFTERNOON WHEN CIRCUMSTANCES PERMIT: HIGH CHANCE OF DOZING
HOW LIKELY ARE YOU TO NOD OFF OR FALL ASLEEP WHILE SITTING AND TALKING TO SOMEONE: SLIGHT CHANCE OF DOZING
HOW LIKELY ARE YOU TO NOD OFF OR FALL ASLEEP WHILE SITTING INACTIVE IN A PUBLIC PLACE: SLIGHT CHANCE OF DOZING
HOW LIKELY ARE YOU TO NOD OFF OR FALL ASLEEP WHILE SITTING QUIETLY AFTER LUNCH WITHOUT ALCOHOL: MODERATE CHANCE OF DOZING
HOW LIKELY ARE YOU TO NOD OFF OR FALL ASLEEP WHILE WATCHING TV: MODERATE CHANCE OF DOZING

## 2022-09-19 ASSESSMENT — ANXIETY QUESTIONNAIRES
GAD7 TOTAL SCORE: 19
2. NOT BEING ABLE TO STOP OR CONTROL WORRYING: NEARLY EVERY DAY
7. FEELING AFRAID AS IF SOMETHING AWFUL MIGHT HAPPEN: NEARLY EVERY DAY
GAD7 TOTAL SCORE: 19
6. BECOMING EASILY ANNOYED OR IRRITABLE: NEARLY EVERY DAY
7. FEELING AFRAID AS IF SOMETHING AWFUL MIGHT HAPPEN: NEARLY EVERY DAY
1. FEELING NERVOUS, ANXIOUS, OR ON EDGE: NEARLY EVERY DAY
4. TROUBLE RELAXING: NEARLY EVERY DAY
8. IF YOU CHECKED OFF ANY PROBLEMS, HOW DIFFICULT HAVE THESE MADE IT FOR YOU TO DO YOUR WORK, TAKE CARE OF THINGS AT HOME, OR GET ALONG WITH OTHER PEOPLE?: EXTREMELY DIFFICULT
IF YOU CHECKED OFF ANY PROBLEMS ON THIS QUESTIONNAIRE, HOW DIFFICULT HAVE THESE PROBLEMS MADE IT FOR YOU TO DO YOUR WORK, TAKE CARE OF THINGS AT HOME, OR GET ALONG WITH OTHER PEOPLE: EXTREMELY DIFFICULT
3. WORRYING TOO MUCH ABOUT DIFFERENT THINGS: NEARLY EVERY DAY
GAD7 TOTAL SCORE: 19
5. BEING SO RESTLESS THAT IT IS HARD TO SIT STILL: SEVERAL DAYS

## 2022-09-19 ASSESSMENT — PATIENT HEALTH QUESTIONNAIRE - PHQ9
SUM OF ALL RESPONSES TO PHQ QUESTIONS 1-9: 24
10. IF YOU CHECKED OFF ANY PROBLEMS, HOW DIFFICULT HAVE THESE PROBLEMS MADE IT FOR YOU TO DO YOUR WORK, TAKE CARE OF THINGS AT HOME, OR GET ALONG WITH OTHER PEOPLE: EXTREMELY DIFFICULT
SUM OF ALL RESPONSES TO PHQ QUESTIONS 1-9: 24

## 2022-09-19 NOTE — PATIENT INSTRUCTIONS
I have increased the patient's hydroxyzine to 50 mg twice a day as a as needed.  I have encouraged her to utilize the as needed Zyprexa which she has not been using.  She will continue with the Effexor and I am considering a future increase in that.  She will return to this clinic in 6 to 8 weeks for follow-up and agrees to call before then with any questions or concerns.

## 2022-09-19 NOTE — NURSING NOTE
Flu vaccine 1/18/22. Medications were reviewed at 8:24 a.m. on 9/19/22.  Patient declined individual medication review by support staff because - pt stated she did not know what medications she's taking.
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
## 2022-09-19 NOTE — PROGRESS NOTES
Answers for HPI/ROS submitted by the patient on 9/19/2022  If you checked off any problems, how difficult have these problems made it for you to do your work, take care of things at home, or get along with other people?: Extremely difficult  PHQ9 TOTAL SCORE: 24  MELODY 7 TOTAL SCORE: 19

## 2022-09-20 RX ORDER — PRAZOSIN HYDROCHLORIDE 1 MG/1
CAPSULE ORAL
Qty: 60 CAPSULE | Refills: 2 | Status: SHIPPED | OUTPATIENT
Start: 2022-09-20 | End: 2023-05-25

## 2022-09-20 NOTE — TELEPHONE ENCOUNTER
Date of Last Office Visit: 919/22  Date of Next Office Visit: 10/31/22  No shows since last visit: none  Cancellations since last visit: none    Medication requested: prazosin 1mg Date last ordered: 8/4/22 Qty: 30 Refills: 0     Lapse in medication adherence greater than 5 days?: maybe  If yes, call patient and gather details: patient was hospitalized  Medication refill request verified as identical to current order?: yes  Result of Last DAM, VPA, Li+ Level, CBC, or Carbamazepine Level (at or since last visit): N/A    Last visit treatment plan:   Plan:  I am going to continue with the current medication regime although I have increased the as needed hydroxyzine to 50 mg twice a day for times of anxiety.  I have encouraged the patient to utilize the as needed Zyprexa which she has not been doing.  At this time we will continue with the Effexor as ordered but I will consider a future increase.    []Medication refilled per  Medication Refill in Ambulatory Care  policy.  [x]Medication unable to be refilled by RN due to criteria not met as indicated below:    []Eligibility - not seen in the last year   []Supervision - no future appointment   []Compliance - no shows, cancellations or lapse in therapy   [x]Verification - order discrepancy   []Controlled medication   []Medication not included in policy   []90-day supply request   []Other

## 2022-09-20 NOTE — NURSING NOTE
Left patient a voice mail for 3 month follow up, also sent patient a mychart to schedule a 3 month follow up appointment.    Amadeo Mcclendon CMA

## 2022-09-21 ENCOUNTER — PATIENT OUTREACH (OUTPATIENT)
Dept: CARE COORDINATION | Facility: CLINIC | Age: 32
End: 2022-09-21

## 2022-09-21 ASSESSMENT — ACTIVITIES OF DAILY LIVING (ADL): DEPENDENT_IADLS:: INDEPENDENT

## 2022-09-21 NOTE — PROGRESS NOTES
Clinic Care Coordination Contact  [facility]/Voicemail    Clinical Data: Patient discharged from [facility] 9/13/22 with [facility] home infusion/nursing services. Patient was seen in clinic on 9/16/22 by Dr. Zepeda for her post hospital/TCU follow up appointment. CC RN reaching out to patient as she is an identified care coordination patient.     Outreach attempted x 1.  Left message on patient's voicemail with call back information and requested return call.    Plan:   Care Coordinator will send care coordination introduction letter with care coordinator contact information and explanation of care coordination services via [employer].   Care Coordinator will try to reach patient again in 2-3 business days.    Erum Soto RN, BSN, PHN Care Coordinator  [place], [place], and [place]   Phone: [phone]

## 2022-09-21 NOTE — PROGRESS NOTES
This is a recent snapshot of the patient's [facility] medical record.  For current drug dose and complete information and questions, call [phone] or In Basket pool, [facility] home infusion ([id number])  CSN Number:  [id number]

## 2022-09-21 NOTE — LETTER
[facility] CARE COORDINATION  [address]  [place] [address]    September 21, 2022    Hilaria Bonner  [address]  [address]  [place] [id number]      Dear Hilaria,        I am a clinic care coordinator who works with Crissy Madrigal PA-C with the [facility]. Below is a description of clinic care coordination and how I can further assist you.       The clinic care coordination team is made up of a registered nurse, , financial resource worker and community health worker who understand the health care system. The goal of clinic care coordination is to help you manage your health and improve access to the health care system. Our team works alongside your provider to assist you in determining your health and social needs. We can help you obtain health care and community resources, providing you with necessary information and education. We can work with you through any barriers and develop a care plan that helps coordinate and strengthen the communication between you and your care team.    Please feel free to contact me with any questions or concerns regarding care coordination and what we can offer.      We are focused on providing you with the highest-quality healthcare experience possible.    Sincerely,     Erum Soto RN, BSN, PHN Care Coordinator  [place], [place], and [place]   Phone: [phone]

## 2022-09-26 ENCOUNTER — VIRTUAL VISIT (OUTPATIENT)
Dept: PSYCHOLOGY | Facility: CLINIC | Age: 32
End: 2022-09-26
Payer: COMMERCIAL

## 2022-09-26 ENCOUNTER — TELEPHONE (OUTPATIENT)
Dept: FAMILY MEDICINE | Facility: CLINIC | Age: 32
End: 2022-09-26

## 2022-09-26 DIAGNOSIS — U09.9 POST-COVID SYNDROME: ICD-10-CM

## 2022-09-26 DIAGNOSIS — G62.9 NEUROPATHY: Primary | ICD-10-CM

## 2022-09-26 DIAGNOSIS — F33.1 MDD (MAJOR DEPRESSIVE DISORDER), RECURRENT EPISODE, MODERATE (H): ICD-10-CM

## 2022-09-26 DIAGNOSIS — F43.21 GRIEF REACTION: ICD-10-CM

## 2022-09-26 PROCEDURE — 90837 PSYTX W PT 60 MINUTES: CPT | Mod: 95 | Performed by: SOCIAL WORKER

## 2022-09-26 NOTE — PROGRESS NOTES
[facility]                                     Progress Note    Patient Name: Hilaria Bonner  Date: 2022         Service Type: Individual      Session Start Time: 8:04  Session End Time: 8:57     Session Length: 53    Session #:1    Attendees: Client    Service Modality:  Video Visit:      Provider verified identity through the following two step process.  Patient provided:  Patient photo, Patient  and Patient address    Telemedicine Visit: The patient's condition can be safely assessed and treated via synchronous audio and visual telemedicine encounter.      Reason for Telemedicine Visit: Services only offered telehealth    Originating Site (Patient Location): Patient's home    Distant Site (Provider Location): Provider Remote Setting    Consent:  The patient/guardian has verbally consented to: the potential risks and benefits of telemedicine (video visit) versus in person care; bill my insurance or make self-payment for services provided; and responsibility for payment of non-covered services.     Patient would like the video invitation sent by:  My Chart    Mode of Communication:  Video Conference via D[employer]    As the provider I attest to compliance with applicable laws and regulations related to telemedicine.    DATA  Interactive Complexity: No  Crisis: No      Progress Since Last Session (Related to Symptoms / Goals / Homework):   Symptoms: Has been sick. was last seen in July    Homework: Partially completed      Episode of Care Goals: Minimal progress - ACTION (Actively working towards change); Intervened by reinforcing change plan / affirming steps taken     Current / Ongoing Stressors and Concerns: Patient reports she has been sick lately which has kept away from therapy sessions. Her last visit was in July. She went to hospital. Has been doing poorly, per her report. Currently lives with parents due to a fall. Today she is expressing the needs for support to navigate the  system. Wants to have a CM. Reports voices are still present. She is not sure how all this is happening. Still grieving the death of the father of her children. Has been also thinking about her past traumatic experience. She note she would like to start processing her thoughts and feelings about these events. She and writer discussed the target issues: anxiety, depression in the context of grief and trauma. Her visits will be biweekly.     Treatment Objective(s) Addressed in This Session:   use thought-stopping strategy daily to reduce intrusive thoughts  Identify negative self-talk and behaviors: challenge core beliefs, myths, and actions  increase understanding of steps in the grief process  PTSD: process past traumatic events     Intervention:     Situation        Automatic Thoughts  Cognitive Distortions      Feelings        Behavior        Questioning Thoughts          Motivational Interviewing    MI Intervention: Co-Developed Goal: targetting anxiety, depression and grief and trauma     Change Talk Expressed by the Patient: Taking steps    Provider Response to Change Talk: E - Evoked more info from patient about behavior change, A - Affirmed patient's thoughts, decisions, or attempts at behavior change, R - Reflected patient's change talk and S - Summarized patient's change talk statements    Assessments completed prior to visit:    The following assessments were completed by patient for this visit:  PHQ2:   PHQ-2 ( 1999 [employer]) 7/26/2022 5/13/2022 5/13/2022 2/15/2022 2/15/2022 11/16/2021 8/13/2021   Q1: Little interest or pleasure in doing things 3 - - - - 1 3   Q2: Feeling down, depressed or hopeless 3 - - - - 1 3   PHQ-2 Score 6 - - - - 2 6   PHQ-2 Total Score (12-17 Years)- Positive if 3 or more points; Administer PHQ-A if positive - - - - - - 6   Q1: Little interest or pleasure in doing things Nearly every day - - - - Several days Nearly every day   Q2: Feeling down, depressed or hopeless Nearly every day  - - - - Several days Nearly every day   PHQ-2 Score 6 Incomplete Incomplete Incomplete Incomplete 2 6     PHQ9:   PHQ-9 SCORE 8/13/2021 9/1/2021 2/15/2022 5/13/2022 7/21/2022 7/26/2022 9/19/2022   PHQ-9 Total Score MyChart 19 (Moderately severe depression) 20 (Severe depression) 7 (Mild depression) 7 (Mild depression) - 22 (Severe depression) 24 (Severe depression)   PHQ-9 Total Score 19 20 7 7 22 22 24   PHQ-A Total Score - - - - - - -     GAD2:   MELODY-2 7/25/2022 7/25/2022   Feeling nervous, anxious, or on edge 3 3   Not being able to stop or control worrying 3 3   MELODY-2 Total Score 6 6     GAD7:   MELODY-7 SCORE 9/1/2021 2/15/2022 5/13/2022 7/21/2022 7/25/2022 7/25/2022 9/19/2022   Total Score 15 (severe anxiety) 9 (mild anxiety) 6 (mild anxiety) - - 18 (severe anxiety) 19 (severe anxiety)   Total Score 15 9 6 18 18 18 19     CAGE-AID:   CAGE-AID Total Score 6/1/2018 7/21/2022   Total Score 0 1   Total Score MyChart 0 (A total score of 2 or greater is considered clinically significant) -     PROMIS 10-Global Health (all questions and answers displayed):   PROMIS 10 7/21/2022 9/19/2022   In general, would you say your health is: - Fair   In general, would you say your quality of life is: - Poor   In general, how would you rate your physical health? - Poor   In general, how would you rate your mental health, including your mood and your ability to think? - Poor   In general, how would you rate your satisfaction with your social activities and relationships? - Poor   In general, please rate how well you carry out your usual social activities and roles - Poor   To what extent are you able to carry out your everyday physical activities such as walking, climbing stairs, carrying groceries, or moving a chair? - A little   How often have you been bothered by emotional problems such as feeling anxious, depressed or irritable? - Always   How would you rate your fatigue on average? - Very severe   How would you rate your pain  on average?   0 = No Pain  to  10 = Worst Imaginable Pain - 8   In general, would you say your health is: 2 2   In general, would you say your quality of life is: 2 1   In general, how would you rate your physical health? 1 1   In general, how would you rate your mental health, including your mood and your ability to think? 1 1   In general, how would you rate your satisfaction with your social activities and relationships? 1 1   In general, please rate how well you carry out your usual social activities and roles. (This includes activities at home, at work and in your community, and responsibilities as a parent, child, spouse, employee, friend, etc.) 3 1   To what extent are you able to carry out your everyday physical activities such as walking, climbing stairs, carrying groceries, or moving a chair? 2 2   In the past 7 days, how often have you been bothered by emotional problems such as feeling anxious, depressed, or irritable? 5 5   In the past 7 days, how would you rate your fatigue on average? 2 5   In the past 7 days, how would you rate your pain on average, where 0 means no pain, and 10 means worst imaginable pain? 9 8   Global Mental Health Score 5 4   Global Physical Health Score 9 6   PROMIS TOTAL - SUBSCORES 14 10   Some recent data might be hidden     [place] Suicide Severity Rating Scale (Lifetime/Recent)  [place] Suicide Severity Rating (Lifetime/Recent) 3/26/2019   Q2 Suicidal Thoughts (Past Month) no   Q6 Suicide Behavior (Lifetime) no     [place] Suicide Severity Rating Scale (Short Version)  [place] Suicide Severity Rating (Short Version) 5/29/2020 6/5/2020 10/22/2020 3/22/2021 7/21/2022 7/26/2022 7/28/2022   Over the past 2 weeks have you felt down, depressed, or hopeless? no yes yes yes - no no   Over the past 2 weeks have you had thoughts of killing yourself? no no no no - no no   Have you ever attempted to kill yourself? no no no no - no no   Q2 Suicidal Thoughts (Past Month) - - - - - - -    Q6 Suicide Behavior (Lifetime) - - - - - - -   1. Wish to be Dead (Since Last Contact) - - - - 0 - -   2. Non-Specific Active Suicidal Thoughts (Since Last Contact) - - - - 0 - -   Actual Attempt (Since Last Contact) - - - - 0 - -   Interrupted Attempts (Since Last Contact) - - - - 0 - -   Aborted or Self-Interrupted Attempt (Since Last Contact) - - - - 0 - -   Preparatory Acts or Behavior (Since Last Contact) - - - - 0 - -   Suicide (Since Last Contact) - - - - 0 - -   Calculated C-SSRS Risk Score (Since Last Contact) - - - - No Risk Indicated - -       ASSESSMENT: Current Emotional / Mental Status (status of significant symptoms):   Risk status (Self / Other harm or suicidal ideation)   Patient denies current fears or concerns for personal safety.   Patient denies current or recent suicidal ideation or behaviors.   Patient denies current or recent homicidal ideation or behaviors.   Patient denies current or recent self injurious behavior or ideation.   Patient denies other safety concerns.   Patient reports there has been no change in risk factors since their last session.     Patient reports there has been no change in protective factors since their last session.     Recommended that patient call 911 or go to the local ED should there be a change in any of these risk factors.     Appearance:   Appropriate    Eye Contact:   Good    Psychomotor Behavior: Normal    Attitude:   Cooperative    Orientation:   Person Place Time Situation   Speech    Rate / Production: Normal/ Responsive Normal     Volume:  Normal    Mood:    Anxious  Depressed    Affect:    Appropriate    Thought Content:  Clear    Thought Form:  Coherent  Logical    Insight:    Good      Medication Review:   No changes to current psychiatric medication(s)     Medication Compliance:   Yes     Changes in Health Issues:   None reported     Chemical Use Review:   Substance Use: Chemical use reviewed, no active concerns identified      Tobacco Use: No  change in amount of tobacco use since last session.  Contemplation    Diagnosis:  1. MDD (major depressive disorder), recurrent episode, moderate (H)    2. Grief reaction      Collateral Reports Completed:   Routed note to PCP    PLAN: (Patient Tasks / Therapist Tasks / Other)  Patient will talk to her family when feeling sad.   Patient will stay in touch with her providers  Patient will increase positive self talk when feeling sad.   Patient will practice meditation to bring her thoughts to here.  Patient's next visit is in 2 weeks..    Dai Zayas, LICSW  ___________________________________________________________________  Individual Treatment Plan    Patient's Name: Hilaria Bonner  YOB: 1990    Date of Creation: 9/26/2022    Date Treatment Plan Last Reviewed/Revised: 9/26/2022    DSM5 Diagnoses: 296.32 (F33.1) Major Depressive Disorder, Recurrent Episode, Moderate _ and With anxious distress, 300.02 (F41.1) Generalized Anxiety Disorder or Adjustment Disorders  309.28 (F43.23) With mixed anxiety and depressed mood ;Grief reaction [F43.21]    Psychosocial / Contextual Factors:ongoing depression, anxiety, recent loss of long term partner , the father of her 2 children, strong history of child trauma, ongoing medical issues, history of trauma     PROMIS (reviewed every 90 days): 10    Referral / Collaboration:  Referral to another professional/service is not indicated at this time.    MeasurableTreatment Goal(s) related to diagnosis / functional impairment(s)  develop health cognitive patterns and beliefs about self and the world that lead to alleviation and help manageable daily functions.    Anticipated number of session for this episode of care: 9-12 sessions  Anticipation frequency of session: Biweekly  Anticipated Duration of each session: 53 or more minutes  Treatment plan will be reviewed in 90 days or when goals have been changed.       MeasurableTreatment Goal(s) related to  "diagnosis / functional impairment(s)  Goal 1: Patient will develop health cognitive patterns and beliefs about self and the world that lead to alleviation and help prevent the relapse of depression symptoms.     I will know I've met my goal when the level of my depression is reduced from 4/4 to 3/4 or better\"     Objective #A (Patient Action)                          Patient will Increase interest, engagement, and pleasure in doing things  Decrease frequency and intensity of feeling down, depressed, hopeless  Identify negative self-talk and behaviors: challenge core beliefs, myths, and actions  Improve concentration, focus, and mindfulness in daily activities .  Status: Continued - Date(s): 9/26/202  Intervention(s)  Therapist will provide provide space to express feelings and thoughts.     Objective #B  Patient will Improve quantity and quality of night time sleep / decrease daytime naps  Feel less tired and more energy during the day   Improve concentration, focus, and mindfulness in daily activities .  Status: Continued - Date(s): 9/26/2022     Intervention(s)  Therapist will assign homework : review and practice CBT skills.     Objective #C  Patient will identify at least 4 fears / thoughts that contribute to feeling anxious.  Status: Continued - Date(s): 9/26/2022  Intervention(s)  Therapist will teach distraction skills.  : self soothe using the 4 senses.     Goal 2: Patient will stabilize anxiety level while increasing ability to function on a daily basis     I will know I've met my goal when when the level of my anxiety is reduced from 4/4 to 3/4 or better by next review.       Objective #A (Patient Action)                          Status: Continued - Date(s): 9/26/2022  Patient will use relaxation strategies 4 times per day to reduce the physical symptoms of anxiety.   Intervention(s)  Therapist will teach emotional recognition/identification. : express feelings as they present for support.     Objective " "#B  Patient will use thought-stopping strategy daily to reduce intrusive thoughts.                        Status: Continued - Date(s): 9/26/2022  Intervention(s)  Therapist will practice CBT/DBT/ACT skills. .     Objective #C  Patient will make a list of pros and cons for tolerating and not tolerating an urge to harm self.  Status: Continued - Date(s): 9/26/2022     Intervention(s)  Therapist will  assess for safety and reinforce safety plan    Goal 3: Patient will display an understanding of the grief process and how this process may be exacerbated by or may exacerbate mental illness symptoms.    I will know I've met my goal when I am able to express unresolved emotions regarding my loss by the next review\"     Objective #A (Patient Action)                          Patient will identify how the use of substances contributes to the avoidance of feeling associated with the loss.  Status: Continued - Date(s):9/26/2022  Intervention(s)  Therapist will provide space and time to process grief.     Goal 3: Patient will develop an understanding of how avoidance of the grief process may affect functioning in all areas of functioning.   I will know I've met my goal when I have developed alternative diversions and other coping mechanisms that are related to loss issues by the next review       Objective #A (Patient Action)                          Status: Continued - Date(s): 9/26/2022  Patient will talk to at least two others about losses and coping.  Intervention(s)  Therapist will provide some education on how to safely share feelings of loss with others.    Patient has reviewed and agreed to the above plan.      JESSICA Reagan  September 26, 2022    "

## 2022-09-26 NOTE — PROGRESS NOTES
This is a recent snapshot of the patient's [facility] medical record.  For current drug dose and complete information and questions, call [phone] or In Basket pool, [facility] home infusion ([id number])  CSN Number:  [id number]

## 2022-09-26 NOTE — TELEPHONE ENCOUNTER
Incoming call from pt. Pt calling stating requesting assistance with her referrals and orders since her recent discharge, and would benefit from assistance navigating health care system to ensure all of her needs are met. RN encouraged pt to return call to the Care Coordinator that has tried to reach her and send letters to see if Care Coordination team can be of further assistance/support her. Pt has questions about how the Care Coordinator team works with patients.  RN encouraged pt to return call to the Care Coordinator to discuss.    Pt was seen by Dr. Zepeda on 9/16/22. Pt has the following additional needs that will be forwarded to provider related to the 9/16/22 visit:    1. Pt states she needs a letter/statement of medical necessity for the 4-wheel walker ordered by Dr. Zepeda.   She requests please fax the letter to [facility] in [place] phone: [phone], fax: [phone].   Pt requests call back from  when this has been faxed.      2. Pt states she would like to schedule Physical Therapy at [facility] with Radha Watt PT, who she has worked with previously. RN advised the 9/16/22 PT referral is to be seen at [facility] in general, so pt should be able to call and schedule at the [facility] location and with provider she prefers.    JONATHAN RubalcavaN, RN

## 2022-09-26 NOTE — LETTER
[address] [id number] [phone]           2022    To whom it may concern:    Hilaria Bonner  89 was seen recently by me in clinic.  She needs a 4 wheel walker with seat and brakes.  I believe she is borrowing one for now but will need one of her own for long term use.  She has severe neuropathy which makes ambulation difficult.  She also has long covid and diabetes.           Sincerely,                 Jose Zepeda MD      Fax to [facility] [phone]

## 2022-10-03 ENCOUNTER — TELEPHONE (OUTPATIENT)
Dept: FAMILY MEDICINE | Facility: CLINIC | Age: 32
End: 2022-10-03

## 2022-10-03 DIAGNOSIS — E11.65 TYPE 2 DIABETES MELLITUS WITH HYPERGLYCEMIA, WITHOUT LONG-TERM CURRENT USE OF INSULIN (H): Primary | ICD-10-CM

## 2022-10-03 RX ORDER — GLUCOSAMINE HCL/CHONDROITIN SU 500-400 MG
CAPSULE ORAL
Qty: 100 EACH | Refills: 11 | Status: SHIPPED | OUTPATIENT
Start: 2022-10-03 | End: 2023-01-24

## 2022-10-03 RX ORDER — LANCETS
EACH MISCELLANEOUS
Qty: 90 EACH | Refills: 11 | Status: SHIPPED | OUTPATIENT
Start: 2022-10-03 | End: 2023-04-20

## 2022-10-03 NOTE — TELEPHONE ENCOUNTER
"  General Call    Contacts       Type Contact Phone/Fax    10/03/2022 09:38 AM CDT Phone (Incoming) Hilaria Bonner (Self) [phone] (H)        Reason for Call:  Patient requesting RX for glucose reader and \"finger reader\" to be sent to pharmacy.     What are your questions or concerns:  Insurance told her to request     Date of last appointment with provider: 7/26/22    Could we send this information to you in [facility] or would you prefer to receive a phone call?:   Patient would prefer a phone call   Okay to leave a detailed message?: Yes at Cell number on file:    Telephone Information:   Mobile [phone]       "

## 2022-10-07 ENCOUNTER — VIRTUAL VISIT (OUTPATIENT)
Dept: PSYCHOLOGY | Facility: CLINIC | Age: 32
End: 2022-10-07
Payer: MEDICARE

## 2022-10-07 ENCOUNTER — OFFICE VISIT (OUTPATIENT)
Dept: DERMATOLOGY | Facility: CLINIC | Age: 32
End: 2022-10-07
Attending: FAMILY MEDICINE
Payer: MEDICARE

## 2022-10-07 DIAGNOSIS — R20.8 SKIN PAIN: ICD-10-CM

## 2022-10-07 DIAGNOSIS — I89.0 LYMPHEDEMA: ICD-10-CM

## 2022-10-07 DIAGNOSIS — L60.8 NAIL DISCOLORATION: ICD-10-CM

## 2022-10-07 DIAGNOSIS — R23.4 PEELING SKIN: ICD-10-CM

## 2022-10-07 DIAGNOSIS — R21 RASH: Primary | ICD-10-CM

## 2022-10-07 DIAGNOSIS — F43.21 GRIEF REACTION: Primary | ICD-10-CM

## 2022-10-07 PROCEDURE — 99204 OFFICE O/P NEW MOD 45 MIN: CPT | Mod: 25 | Performed by: DERMATOLOGY

## 2022-10-07 PROCEDURE — 88304 TISSUE EXAM BY PATHOLOGIST: CPT | Mod: TC | Performed by: DERMATOLOGY

## 2022-10-07 PROCEDURE — 88312 SPECIAL STAINS GROUP 1: CPT | Mod: 26 | Performed by: DERMATOLOGY

## 2022-10-07 PROCEDURE — 88304 TISSUE EXAM BY PATHOLOGIST: CPT | Mod: 26 | Performed by: DERMATOLOGY

## 2022-10-07 PROCEDURE — 90837 PSYTX W PT 60 MINUTES: CPT | Mod: 95 | Performed by: SOCIAL WORKER

## 2022-10-07 RX ORDER — KETOCONAZOLE 20 MG/G
CREAM TOPICAL DAILY
Qty: 60 G | Refills: 11 | Status: SHIPPED | OUTPATIENT
Start: 2022-10-07 | End: 2023-11-30

## 2022-10-07 RX ORDER — FLUOCINONIDE 0.5 MG/G
CREAM TOPICAL 2 TIMES DAILY
Qty: 60 G | Refills: 0 | Status: SHIPPED | OUTPATIENT
Start: 2022-10-07 | End: 2023-04-20

## 2022-10-07 ASSESSMENT — PAIN SCALES - GENERAL: PAINLEVEL: EXTREME PAIN (9)

## 2022-10-07 NOTE — PATIENT INSTRUCTIONS
Start ketoconazole cream twice a day for 2 weeks, if you see improvement with that, continue to use ketoconazole cream.   If in two weeks you see no change or worsening, use the fluocinonide cream.     Come back in 6-8 weeks.

## 2022-10-07 NOTE — LETTER
10/7/2022       RE: Hilaria Bonner  [address]  [address]  [facility] [address]     Dear Colleague,    Thank you for referring your patient, Hilaria Bonner, to the [facility]ERMAT[facility] [place] at [facility]. Please see a copy of my visit note below.    [facility] Dermatology Note  Encounter Date: Oct 7, 2022  Office Visit     Dermatology Problem List:  # Hand/foot dermatitis.  - Ddx tinea versus eczematous dermatitis  - KOH prep inconclusive  - Given nail changes that could be c/w onychomycosis, will trial ketoconazole cream BID initially; then if no improvement, start fluocinonide cream BID   # Distal nail discoloration.  - PAS 10/7/2022   ____________________________________________    Assessment & Plan:    # Hand/foot dermatitis.  - Ddx tinea versus eczematous dermatitis  - KOH prep inconclusive today  - Given concurrent nail changes that could be c/w onychomycosis, will check nail clipping for PAS, trial ketoconazole cream BID initially x2 weeks; then if no improvement, start fluocinonide cream BID     # Distal fingernail discoloration.  - Nail clipping for PAS     # Lymphedema   - Recommended compression stockings   - Referral to lymphedema clinic     Procedures Performed:   - KOH prep was obtained and interpreted as inconclusive.     Follow-up: 6 to 8 weeks, sooner if concerns.     Staff and Scribe:     Marifer Burris, MS3    Scribe Disclosure:  I, Pacheco Hernandez, am serving as a scribe to document services personally performed by Priscilla Wilburn MD based on data collection and the provider's statements to me.     Staff Physician:  I was present with the medical student who participated in the service and in the documentation of the note. I have verified the history and personally performed the physical exam and medical decision making. I agree with the assessment and plan of care as documented in  the note.       Priscilla Wilburn MD    Department of Dermatology  [facility]note 3[facility]: Phone: [phone], Fax: [phone]  10/7/2022      ____________________________________________    CC: Derm Problem (Hilaria is here for a skin pain and peeling skin. ) and Hair Loss (She would also like to be evaluated for HL. )    HPI:  Ms. Hilaria Bonner is a(n) 31 year old female who presents today as a new patient for itching and peeling of hands and feet which started this summer. She says that the itchiness is mainly on the tops of her hands and all across her feet, with the itchiness being the most significant in her feet. She has some pain in these areas, but says that this is consistent with her existing neuropathy. She has been treating the hands with an unspecified cream that her aunt gave her (non-prescription) and the feet with ammonium lactate cream. She applies these creams about 4 times a day, with no relief. She denies family history or personal history of eczema and denies asthma or seasonal allergies. Pt also states that her fingernails have not grown in a long time, and that this concerns her.     Of note, pt has a complicated medical history including PEA arrest due to PE, CIDP on IVIG, DM2, and recently had suspected cellulitis of her left lower extremity during a hospitalization in July 2022. She is currently living with her parents and has a personal care assistant.     Pt denies rashes elsewhere on the body. No other concerns today.     Labs Reviewed:  N/A    Physical Exam:  Vitals: LMP  (LMP Unknown)   SKIN: Focused exam of hands, feet, and nails.   - 1+ pitting edema on bilateral legs   - Some yellowish/brown discoloration of the distal fingernails.  - Flaking of bilateral feet and palmar surfaces of the hands   - No other lesions of concern on areas examined.     Medications:  Current Outpatient Medications    Medication     alcohol swab prep pads     alcohol swab prep pads     ammonium lactate (AMLACTIN) 12 % external cream     apixaban ANTICOAGULANT (ELIQUIS) 5 MG tablet     Biotin 5000 MCG TABS     blood glucose (NO BRAND SPECIFIED) test strip     blood glucose (NO BRAND SPECIFIED) test strip     blood glucose calibration (NO BRAND SPECIFIED) solution     blood glucose calibration (NO BRAND SPECIFIED) solution     blood glucose monitoring (NO BRAND SPECIFIED) meter device kit     Blood Glucose Monitoring Suppl (ACCU-CHEK GUIDE) w/Device KIT     calcium Citrate-vitamin D 500-400 MG-UNIT CHEW     diphenhydrAMINE (BENADRYL) 50 MG capsule     EPINEPHrine (ANY BX GENERIC EQUIV) 0.3 MG/0.3ML injection 2-pack     erythromycin (ROMYCIN) 5 MG/GM ophthalmic ointment     fluocinonide (LIDEX) 0.05 % external cream     folic acid (FOLVITE) 1 MG tablet     hydrOXYzine (ATARAX) 25 MG tablet     ketoconazole (NIZORAL) 2 % external cream     medroxyPROGESTERone (DEPO-PROVERA) 150 MG/ML IM injection     metFORMIN (GLUCOPHAGE XR) 500 MG 24 hr tablet     methocarbamol (ROBAXIN) 500 MG tablet     Multiple Vitamins-Minerals (MULTIVITAMIN ADULT) CHEW     OLANZapine (ZYPREXA) 10 MG tablet     OLANZapine (ZYPREXA) 2.5 MG tablet     omeprazole (PRILOSEC) 20 MG DR capsule     ondansetron (ZOFRAN-ODT) 4 MG ODT tab     polyethylene glycol (MIRALAX) 17 GM/SCOOP powder     potassium chloride ER (K-TAB/KLOR-CON) 10 MEQ CR tablet     prazosin (MINIPRESS) 1 MG capsule     Pregabalin (LYRICA) 200 MG capsule     PRIVIGEN 20 GM/200ML SOLN     PRIVIGEN 40 GM/400ML SOLN     SOLU-CORTEF 100 MG injection     sulfamethoxazole-trimethoprim (BACTRIM DS) 800-160 MG tablet     thin (NO BRAND SPECIFIED) lancets     thin (NO BRAND SPECIFIED) lancets     venlafaxine (EFFEXOR XR) 75 MG 24 hr capsule     vitamin B-Complex     vitamin C (ASCORBIC ACID) 1000 MG TABS     vitamin D3 (CHOLECALCIFEROL) 50 mcg (2000 units) tablet     Current Facility-Administered  Medications   Medication     cyanocobalamin injection 1,000 mcg     cyanocobalamin injection 1,000 mcg     medroxyPROGESTERone (DEPO-PROVERA) injection 150 mg      Past Medical History:   Patient Active Problem List   Diagnosis     Vitamin D deficiency     Encounter for smoking cessation counseling     Menorrhagia with irregular cycle     Pulmonary embolism with infarction (H)     Secondary hyperparathyroidism, not elsewhere classified (H)     Paresthesias     Hemorrhoids, unspecified hemorrhoid type     Chronic inflammatory demyelinating polyneuropathy (H)     S/P laparoscopic sleeve gastrectomy     Morbid obesity (H)     Moderate major depression (H)     Alcohol abuse     Cognitive and neurobehavioral dysfunction following brain injury (H)     Psychosis, unspecified psychosis type (H)     MELODY (generalized anxiety disorder)     Medical cannabis use     Tobacco use     Compression fracture of T9 vertebra with routine healing, subsequent encounter     Benign essential hypertension     History of pulmonary embolism     KENDALL (obstructive sleep apnea)- mild (AHI 6)     Type 2 diabetes mellitus with hyperglycemia, without long-term current use of insulin (H)     Vitamin B12 deficiency (non anemic)     Chronic anticoagulation     Elevated LFTs     Acute kidney injury (H)     Elevated lactic acid level     Gastrointestinal hemorrhage, unspecified gastrointestinal hemorrhage type     Hypotension due to hypovolemia     Cervical high risk HPV (human papillomavirus) test positive     Urinary incontinence     Past Medical History:   Diagnosis Date     Acute kidney injury (H) 05/13/2019     Acute massive pulmonary embolism (H) 05/13/2019     Acute pancreatitis 08/18/2018     Acute pancreatitis 02/26/2021    due to ETOH     Acute thoracic back pain 04/07/2021     ARAMIS (acute kidney injury) (H) 05/26/2019     Cardiac arrest, cause unspecified (H) 05/13/2019    massive pulmonary embolism with pulseless electrical activity cardiac  arrest in May 2019 following gastric bypass in April 2019      Depression with anxiety      GERD (gastroesophageal reflux disease)      History of alcohol use disorder      HTN (hypertension)      Infection due to 2019 novel coronavirus 04/2020    COVID19 infection in April 2020     Ischemic colitis (H) 05/26/2019     Morbid obesity (H)      Scalp laceration, initial encounter 02/29/2020        CC Jose Zepeda MD  [address] [facility] [place] [address] on close of this encounter.

## 2022-10-07 NOTE — PROGRESS NOTES
[facility] Dermatology Note  Encounter Date: Oct 7, 2022  Office Visit     Dermatology Problem List:  # Hand/foot dermatitis.  - Ddx tinea versus eczematous dermatitis  - KOH prep inconclusive  - Given nail changes that could be c/w onychomycosis, will trial ketoconazole cream BID initially; then if no improvement, start fluocinonide cream BID   # Distal nail discoloration.  - PAS 10/7/2022   ____________________________________________    Assessment & Plan:    # Hand/foot dermatitis.  - Ddx tinea versus eczematous dermatitis  - KOH prep inconclusive today  - Given concurrent nail changes that could be c/w onychomycosis, will check nail clipping for PAS, trial ketoconazole cream BID initially x2 weeks; then if no improvement, start fluocinonide cream BID     # Distal fingernail discoloration.  - Nail clipping for PAS     # Lymphedema   - Recommended compression stockings   - Referral to lymphedema clinic     Procedures Performed:   - KOH prep was obtained and interpreted as inconclusive.     Follow-up: 6 to 8 weeks, sooner if concerns.     Staff and Scribe:     Marifer Burris, MS3    Scribe Disclosure:  I, Pacheco Hernandez, am serving as a scribe to document services personally performed by Priscilla Wilburn MD based on data collection and the provider's statements to me.     Staff Physician:  I was present with the medical student who participated in the service and in the documentation of the note. I have verified the history and personally performed the physical exam and medical decision making. I agree with the assessment and plan of care as documented in the note.       Priscilla Wilburn MD    Department of Dermatology  [facility]: Phone: [phone], Fax: [phone]  10/7/2022      ____________________________________________    CC: Derm Problem (Hilaria is here for a skin pain and peeling  skin. ) and Hair Loss (She would also like to be evaluated for HL. )    HPI:  Ms. Hilaria Bonner is a(n) 31 year old female who presents today as a new patient for itching and peeling of hands and feet which started this summer. She says that the itchiness is mainly on the tops of her hands and all across her feet, with the itchiness being the most significant in her feet. She has some pain in these areas, but says that this is consistent with her existing neuropathy. She has been treating the hands with an unspecified cream that her aunt gave her (non-prescription) and the feet with ammonium lactate cream. She applies these creams about 4 times a day, with no relief. She denies family history or personal history of eczema and denies asthma or seasonal allergies. Pt also states that her fingernails have not grown in a long time, and that this concerns her.     Of note, pt has a complicated medical history including PEA arrest due to PE, CIDP on IVIG, DM2, and recently had suspected cellulitis of her left lower extremity during a hospitalization in July 2022. She is currently living with her parents and has a personal care assistant.     Pt denies rashes elsewhere on the body. No other concerns today.     Labs Reviewed:  N/A    Physical Exam:  Vitals: LMP  (LMP Unknown)   SKIN: Focused exam of hands, feet, and nails.   - 1+ pitting edema on bilateral legs   - Some yellowish/brown discoloration of the distal fingernails.  - Flaking of bilateral feet and palmar surfaces of the hands   - No other lesions of concern on areas examined.     Medications:  Current Outpatient Medications   Medication     alcohol swab prep pads     alcohol swab prep pads     ammonium lactate (AMLACTIN) 12 % external cream     apixaban ANTICOAGULANT (ELIQUIS) 5 MG tablet     Biotin 5000 MCG TABS     blood glucose (NO BRAND SPECIFIED) test strip     blood glucose (NO BRAND SPECIFIED) test strip     blood glucose calibration (NO BRAND  SPECIFIED) solution     blood glucose calibration (NO BRAND SPECIFIED) solution     blood glucose monitoring (NO BRAND SPECIFIED) meter device kit     Blood Glucose Monitoring Suppl (ACCU-CHEK GUIDE) w/Device KIT     calcium Citrate-vitamin D 500-400 MG-UNIT CHEW     diphenhydrAMINE (BENADRYL) 50 MG capsule     EPINEPHrine (ANY BX GENERIC EQUIV) 0.3 MG/0.3ML injection 2-pack     erythromycin (ROMYCIN) 5 MG/GM ophthalmic ointment     fluocinonide (LIDEX) 0.05 % external cream     folic acid (FOLVITE) 1 MG tablet     hydrOXYzine (ATARAX) 25 MG tablet     ketoconazole (NIZORAL) 2 % external cream     medroxyPROGESTERone (DEPO-PROVERA) 150 MG/ML IM injection     metFORMIN (GLUCOPHAGE XR) 500 MG 24 hr tablet     methocarbamol (ROBAXIN) 500 MG tablet     Multiple Vitamins-Minerals (MULTIVITAMIN ADULT) CHEW     OLANZapine (ZYPREXA) 10 MG tablet     OLANZapine (ZYPREXA) 2.5 MG tablet     omeprazole (PRILOSEC) 20 MG DR capsule     ondansetron (ZOFRAN-ODT) 4 MG ODT tab     polyethylene glycol (MIRALAX) 17 GM/SCOOP powder     potassium chloride ER (K-TAB/KLOR-CON) 10 MEQ CR tablet     prazosin (MINIPRESS) 1 MG capsule     Pregabalin (LYRICA) 200 MG capsule     PRIVIGEN 20 GM/200ML SOLN     PRIVIGEN 40 GM/400ML SOLN     SOLU-CORTEF 100 MG injection     sulfamethoxazole-trimethoprim (BACTRIM DS) 800-160 MG tablet     thin (NO BRAND SPECIFIED) lancets     thin (NO BRAND SPECIFIED) lancets     venlafaxine (EFFEXOR XR) 75 MG 24 hr capsule     vitamin B-Complex     vitamin C (ASCORBIC ACID) 1000 MG TABS     vitamin D3 (CHOLECALCIFEROL) 50 mcg (2000 units) tablet     Current Facility-Administered Medications   Medication     cyanocobalamin injection 1,000 mcg     cyanocobalamin injection 1,000 mcg     medroxyPROGESTERone (DEPO-PROVERA) injection 150 mg      Past Medical History:   Patient Active Problem List   Diagnosis     Vitamin D deficiency     Encounter for smoking cessation counseling     Menorrhagia with irregular cycle      Pulmonary embolism with infarction (H)     Secondary hyperparathyroidism, not elsewhere classified (H)     Paresthesias     Hemorrhoids, unspecified hemorrhoid type     Chronic inflammatory demyelinating polyneuropathy (H)     S/P laparoscopic sleeve gastrectomy     Morbid obesity (H)     Moderate major depression (H)     Alcohol abuse     Cognitive and neurobehavioral dysfunction following brain injury (H)     Psychosis, unspecified psychosis type (H)     MELODY (generalized anxiety disorder)     Medical cannabis use     Tobacco use     Compression fracture of T9 vertebra with routine healing, subsequent encounter     Benign essential hypertension     History of pulmonary embolism     KENDALL (obstructive sleep apnea)- mild (AHI 6)     Type 2 diabetes mellitus with hyperglycemia, without long-term current use of insulin (H)     Vitamin B12 deficiency (non anemic)     Chronic anticoagulation     Elevated LFTs     Acute kidney injury (H)     Elevated lactic acid level     Gastrointestinal hemorrhage, unspecified gastrointestinal hemorrhage type     Hypotension due to hypovolemia     Cervical high risk HPV (human papillomavirus) test positive     Urinary incontinence     Past Medical History:   Diagnosis Date     Acute kidney injury (H) 05/13/2019     Acute massive pulmonary embolism (H) 05/13/2019     Acute pancreatitis 08/18/2018     Acute pancreatitis 02/26/2021    due to ETOH     Acute thoracic back pain 04/07/2021     ARAMIS (acute kidney injury) (H) 05/26/2019     Cardiac arrest, cause unspecified (H) 05/13/2019    massive pulmonary embolism with pulseless electrical activity cardiac arrest in May 2019 following gastric bypass in April 2019      Depression with anxiety      GERD (gastroesophageal reflux disease)      History of alcohol use disorder      HTN (hypertension)      Infection due to 2019 novel coronavirus 04/2020    COVID19 infection in April 2020     Ischemic colitis (H) 05/26/2019     Morbid obesity (H)       Scalp laceration, initial encounter 02/29/2020        CC Jose Zepeda MD  [address] [facility]  [place] [address] on close of this encounter.

## 2022-10-07 NOTE — PROGRESS NOTES
[facility]                                     Progress Note    Patient Name: Hilaria Bonner  Date: 10/07/2022         Service Type: Individual      Session Start Time:13:03  Session End Time: 13:58     Session Length: 55    Session #:2    Attendees: Client    Service Modality:  Video Visit:      Provider verified identity through the following two step process.  Patient provided:  Patient photo, Patient  and Patient address    Telemedicine Visit: The patient's condition can be safely assessed and treated via synchronous audio and visual telemedicine encounter.      Reason for Telemedicine Visit: Services only offered telehealth    Originating Site (Patient Location): Patient's home    Distant Site (Provider Location): Provider Remote Setting    Consent:  The patient/guardian has verbally consented to: the potential risks and benefits of telemedicine (video visit) versus in person care; bill my insurance or make self-payment for services provided; and responsibility for payment of non-covered services.     Patient would like the video invitation sent by:  My Chart    Mode of Communication:  Video Conference via phone was used after mychart failed.    As the provider I attest to compliance with applicable laws and regulations related to telemedicine.    DATA  Interactive Complexity: No  Crisis: No      Progress Since Last Session (Related to Symptoms / Goals / Homework):   Symptoms: Has been sick. was last seen in July    Homework: Partially completed      Episode of Care Goals: Minimal progress - ACTION (Actively working towards change); Intervened by reinforcing change plan / affirming steps taken     Current / Ongoing Stressors and Concerns: Patient has been feeling down or depressed sad and unmotivated.  She is a still living with her parents following the fall several weeks ago.  Her mom has been helping her out to get ready to return to her home.  She noted that losing the father of  her children has been in her mind almost every day.  Today, patient discussed about the importance of the of checking her children how they feel they have to have a lost their father.  Patient understands this importance and feels indeed may need to be included in the process to some extent.  Some tips and input were provided on how to do so.  Patient shared that she felt better now that she was able to share how she feels.  She notes that moving forward will be hard and yet possible should she focus on the good moments that she shared for 15 years with the father of her children.  Patient also expressed a need to have someone navigating the system on her behalf.  She had already signed a release of information for [facility] door.  Her assessment, the release of information, and a statement over needs were faxed today.  She is experiencing a higher level of stress related to having to pay her rent at a market rate.  Patient plans to focus on self care this weekend. Her next visit is in a week.      Treatment Objective(s) Addressed in This Session:   use thought-stopping strategy daily to reduce intrusive thoughts  Identify negative self-talk and behaviors: challenge core beliefs, myths, and actions  increase understanding of steps in the grief process  PTSD: process past traumatic events     Intervention:     Situation        Automatic Thoughts  Cognitive Distortions      Feelings        Behavior        Questioning Thoughts          Motivational Interviewing    MI Intervention: Co-Developed Goal: targetting anxiety, depression and grief and trauma     Change Talk Expressed by the Patient: Taking steps    Provider Response to Change Talk: E - Evoked more info from patient about behavior change, A - Affirmed patient's thoughts, decisions, or attempts at behavior change, R - Reflected patient's change talk and S - Summarized patient's change talk statements    Assessments completed prior to visit:    The  following assessments were completed by patient for this visit:  PHQ2:   PHQ-2 ( 1999 [employer]) 7/26/2022 5/13/2022 5/13/2022 2/15/2022 2/15/2022 11/16/2021 8/13/2021   Q1: Little interest or pleasure in doing things 3 - - - - 1 3   Q2: Feeling down, depressed or hopeless 3 - - - - 1 3   PHQ-2 Score 6 - - - - 2 6   PHQ-2 Total Score (12-17 Years)- Positive if 3 or more points; Administer PHQ-A if positive - - - - - - 6   Q1: Little interest or pleasure in doing things Nearly every day - - - - Several days Nearly every day   Q2: Feeling down, depressed or hopeless Nearly every day - - - - Several days Nearly every day   PHQ-2 Score 6 Incomplete Incomplete Incomplete Incomplete 2 6     PHQ9:   PHQ-9 SCORE 8/13/2021 9/1/2021 2/15/2022 5/13/2022 7/21/2022 7/26/2022 9/19/2022   PHQ-9 Total Score MyChart 19 (Moderately severe depression) 20 (Severe depression) 7 (Mild depression) 7 (Mild depression) - 22 (Severe depression) 24 (Severe depression)   PHQ-9 Total Score 19 20 7 7 22 22 24   PHQ-A Total Score - - - - - - -     GAD2:   MELODY-2 7/25/2022 7/25/2022   Feeling nervous, anxious, or on edge 3 3   Not being able to stop or control worrying 3 3   MELODY-2 Total Score 6 6     GAD7:   MELODY-7 SCORE 9/1/2021 2/15/2022 5/13/2022 7/21/2022 7/25/2022 7/25/2022 9/19/2022   Total Score 15 (severe anxiety) 9 (mild anxiety) 6 (mild anxiety) - - 18 (severe anxiety) 19 (severe anxiety)   Total Score 15 9 6 18 18 18 19     CAGE-AID:   CAGE-AID Total Score 6/1/2018 7/21/2022   Total Score 0 1   Total Score MyChart 0 (A total score of 2 or greater is considered clinically significant) -     PROMIS 10-Global Health (all questions and answers displayed):   PROMIS 10 7/21/2022 9/19/2022   In general, would you say your health is: - Fair   In general, would you say your quality of life is: - Poor   In general, how would you rate your physical health? - Poor   In general, how would you rate your mental health, including your mood and your ability  to think? - Poor   In general, how would you rate your satisfaction with your social activities and relationships? - Poor   In general, please rate how well you carry out your usual social activities and roles - Poor   To what extent are you able to carry out your everyday physical activities such as walking, climbing stairs, carrying groceries, or moving a chair? - A little   How often have you been bothered by emotional problems such as feeling anxious, depressed or irritable? - Always   How would you rate your fatigue on average? - Very severe   How would you rate your pain on average?   0 = No Pain  to  10 = Worst Imaginable Pain - 8   In general, would you say your health is: 2 2   In general, would you say your quality of life is: 2 1   In general, how would you rate your physical health? 1 1   In general, how would you rate your mental health, including your mood and your ability to think? 1 1   In general, how would you rate your satisfaction with your social activities and relationships? 1 1   In general, please rate how well you carry out your usual social activities and roles. (This includes activities at home, at work and in your community, and responsibilities as a parent, child, spouse, employee, friend, etc.) 3 1   To what extent are you able to carry out your everyday physical activities such as walking, climbing stairs, carrying groceries, or moving a chair? 2 2   In the past 7 days, how often have you been bothered by emotional problems such as feeling anxious, depressed, or irritable? 5 5   In the past 7 days, how would you rate your fatigue on average? 2 5   In the past 7 days, how would you rate your pain on average, where 0 means no pain, and 10 means worst imaginable pain? 9 8   Global Mental Health Score 5 4   Global Physical Health Score 9 6   PROMIS TOTAL - SUBSCORES 14 10   Some recent data might be hidden     [place] Suicide Severity Rating Scale (Lifetime/Recent)  [place] Suicide  Severity Rating (Lifetime/Recent) 3/26/2019   Q2 Suicidal Thoughts (Past Month) no   Q6 Suicide Behavior (Lifetime) no     [place] Suicide Severity Rating Scale (Short Version)  [place] Suicide Severity Rating (Short Version) 5/29/2020 6/5/2020 10/22/2020 3/22/2021 7/21/2022 7/26/2022 7/28/2022   Over the past 2 weeks have you felt down, depressed, or hopeless? no yes yes yes - no no   Over the past 2 weeks have you had thoughts of killing yourself? no no no no - no no   Have you ever attempted to kill yourself? no no no no - no no   Q2 Suicidal Thoughts (Past Month) - - - - - - -   Q6 Suicide Behavior (Lifetime) - - - - - - -   1. Wish to be Dead (Since Last Contact) - - - - 0 - -   2. Non-Specific Active Suicidal Thoughts (Since Last Contact) - - - - 0 - -   Actual Attempt (Since Last Contact) - - - - 0 - -   Interrupted Attempts (Since Last Contact) - - - - 0 - -   Aborted or Self-Interrupted Attempt (Since Last Contact) - - - - 0 - -   Preparatory Acts or Behavior (Since Last Contact) - - - - 0 - -   Suicide (Since Last Contact) - - - - 0 - -   Calculated C-SSRS Risk Score (Since Last Contact) - - - - No Risk Indicated - -       ASSESSMENT: Current Emotional / Mental Status (status of significant symptoms):   Risk status (Self / Other harm or suicidal ideation)   Patient denies current fears or concerns for personal safety.   Patient denies current or recent suicidal ideation or behaviors.   Patient denies current or recent homicidal ideation or behaviors.   Patient denies current or recent self injurious behavior or ideation.   Patient denies other safety concerns.   Patient reports there has been no change in risk factors since their last session.     Patient reports there has been no change in protective factors since their last session.     Recommended that patient call 911 or go to the local ED should there be a change in any of these risk factors.     Appearance:   Appropriate    Eye Contact:   Good     Psychomotor Behavior: Normal    Attitude:   Cooperative    Orientation:   Person Place Time Situation   Speech    Rate / Production: Normal/ Responsive Normal     Volume:  Normal    Mood:    Anxious  Depressed    Affect:    Appropriate    Thought Content:  Clear    Thought Form:  Coherent  Logical    Insight:    Good      Medication Review:   No changes to current psychiatric medication(s)     Medication Compliance:   Yes     Changes in Health Issues:   None reported     Chemical Use Review:   Substance Use: Chemical use reviewed, no active concerns identified      Tobacco Use: No change in amount of tobacco use since last session.  Contemplation    Diagnosis:  1. Grief reaction      Collateral Reports Completed:   Routed note to PCP    PLAN: (Patient Tasks / Therapist Tasks / Other)  Patient will talk to her family when feeling sad.   Patient will stay in touch with her providers  Patient will increase positive self talk when feeling sad.   Patient will practice meditation to bring her thoughts to here.  Patient will reflect on today's discussion around her grief   Patient's next visit is in 2 weeks..    Dai Zayas, LICSW  ___________________________________________________________________  Individual Treatment Plan    Patient's Name: Hilaria Bonner  YOB: 1990    Date of Creation: 9/26/2022    Date Treatment Plan Last Reviewed/Revised: 9/26/2022    DSM5 Diagnoses: 296.32 (F33.1) Major Depressive Disorder, Recurrent Episode, Moderate _ and With anxious distress, 300.02 (F41.1) Generalized Anxiety Disorder or Adjustment Disorders  309.28 (F43.23) With mixed anxiety and depressed mood ;Grief reaction [F43.21]    Psychosocial / Contextual Factors:ongoing depression, anxiety, recent loss of long term partner , the father of her 2 children, strong history of child trauma, ongoing medical issues, history of trauma     PROMIS (reviewed every 90 days): 10    Referral / Collaboration:  Referral  "to another professional/service is not indicated at this time.    MeasurableTreatment Goal(s) related to diagnosis / functional impairment(s)  develop health cognitive patterns and beliefs about self and the world that lead to alleviation and help manageable daily functions.    Anticipated number of session for this episode of care: 9-12 sessions  Anticipation frequency of session: Biweekly  Anticipated Duration of each session: 53 or more minutes  Treatment plan will be reviewed in 90 days or when goals have been changed.       MeasurableTreatment Goal(s) related to diagnosis / functional impairment(s)  Goal 1: Patient will develop health cognitive patterns and beliefs about self and the world that lead to alleviation and help prevent the relapse of depression symptoms.     I will know I've met my goal when the level of my depression is reduced from 4/4 to 3/4 or better\"     Objective #A (Patient Action)                          Patient will Increase interest, engagement, and pleasure in doing things  Decrease frequency and intensity of feeling down, depressed, hopeless  Identify negative self-talk and behaviors: challenge core beliefs, myths, and actions  Improve concentration, focus, and mindfulness in daily activities .  Status: Continued - Date(s): 9/26/202  Intervention(s)  Therapist will provide provide space to express feelings and thoughts.     Objective #B  Patient will Improve quantity and quality of night time sleep / decrease daytime naps  Feel less tired and more energy during the day   Improve concentration, focus, and mindfulness in daily activities .  Status: Continued - Date(s): 9/26/2022     Intervention(s)  Therapist will assign homework : review and practice CBT skills.     Objective #C  Patient will identify at least 4 fears / thoughts that contribute to feeling anxious.  Status: Continued - Date(s): 9/26/2022  Intervention(s)  Therapist will teach distraction skills.  : self soothe using the 4 " "senses.     Goal 2: Patient will stabilize anxiety level while increasing ability to function on a daily basis     I will know I've met my goal when when the level of my anxiety is reduced from 4/4 to 3/4 or better by next review.       Objective #A (Patient Action)                          Status: Continued - Date(s): 9/26/2022  Patient will use relaxation strategies 4 times per day to reduce the physical symptoms of anxiety.   Intervention(s)  Therapist will teach emotional recognition/identification. : express feelings as they present for support.     Objective #B  Patient will use thought-stopping strategy daily to reduce intrusive thoughts.                        Status: Continued - Date(s): 9/26/2022  Intervention(s)  Therapist will practice CBT/DBT/ACT skills. .     Objective #C  Patient will make a list of pros and cons for tolerating and not tolerating an urge to harm self.  Status: Continued - Date(s): 9/26/2022     Intervention(s)  Therapist will  assess for safety and reinforce safety plan    Goal 3: Patient will display an understanding of the grief process and how this process may be exacerbated by or may exacerbate mental illness symptoms.    I will know I've met my goal when I am able to express unresolved emotions regarding my loss by the next review\"     Objective #A (Patient Action)                          Patient will identify how the use of substances contributes to the avoidance of feeling associated with the loss.  Status: Continued - Date(s):9/26/2022  Intervention(s)  Therapist will provide space and time to process grief.     Goal 3: Patient will develop an understanding of how avoidance of the grief process may affect functioning in all areas of functioning.   I will know I've met my goal when I have developed alternative diversions and other coping mechanisms that are related to loss issues by the next review       Objective #A (Patient Action)                          Status: Continued " - Date(s): 9/26/2022  Patient will talk to at least two others about losses and coping.  Intervention(s)  Therapist will provide some education on how to safely share feelings of loss with others.    Patient has reviewed and agreed to the above plan .      JESSICA Reagan  September 26, 2022

## 2022-10-07 NOTE — NURSING NOTE
Dermatology Rooming Note    Hilaria Bonner's goals for this visit include:   Chief Complaint   Patient presents with     Derm Problem     Hilaria is here for a skin pain and peeling skin.      Hair Loss     She would also like to be evaluated for HL.      Farhan Emanuel, EMT

## 2022-10-10 LAB
PATH REPORT.COMMENTS IMP SPEC: NORMAL
PATH REPORT.FINAL DX SPEC: NORMAL
PATH REPORT.GROSS SPEC: NORMAL
PATH REPORT.MICROSCOPIC SPEC OTHER STN: NORMAL
PATH REPORT.RELEVANT HX SPEC: NORMAL

## 2022-10-13 ENCOUNTER — TELEPHONE (OUTPATIENT)
Dept: FAMILY MEDICINE | Facility: CLINIC | Age: 32
End: 2022-10-13

## 2022-10-13 ENCOUNTER — DOCUMENTATION ONLY (OUTPATIENT)
Dept: PHARMACY | Facility: CLINIC | Age: 32
End: 2022-10-13

## 2022-10-13 DIAGNOSIS — E11.65 TYPE 2 DIABETES MELLITUS WITH HYPERGLYCEMIA, WITHOUT LONG-TERM CURRENT USE OF INSULIN (H): ICD-10-CM

## 2022-10-13 DIAGNOSIS — E87.6 HYPOKALEMIA: Primary | ICD-10-CM

## 2022-10-13 DIAGNOSIS — Z13.0 SCREENING FOR DEFICIENCY ANEMIA: ICD-10-CM

## 2022-10-13 DIAGNOSIS — E53.8 VITAMIN B12 DEFICIENCY (NON ANEMIC): ICD-10-CM

## 2022-10-13 NOTE — PROGRESS NOTES
This is a recent snapshot of the patient's [facility] medical record.  For current drug dose and complete information and questions, call [phone] or In Basket pool, [facility] home infusion ([id number])  CSN Number:  [id number]

## 2022-10-13 NOTE — TELEPHONE ENCOUNTER
Please call patient and put in my same day slot on Wednesday- hospital followup   Schedule a1c, cbc, basic metabolic panel and b12 level as soon as possible preferably tomorrow or monday

## 2022-10-13 NOTE — PROGRESS NOTES
Skilled Nurse visit in the Patient Home to administer privigen 60g in 600 ml.  No recent elevated temperature, fever, chills, productive cough, coughing for 3 weeks or longer or hemoptysis, abnormal vital signs, night sweats, chest pain. No  decrease in your appetite, unexplained weight loss or fatigue.  No other new onset medical symptoms.  Current weight 262 lbs.  Peripheral IVleft forearm, 1 attempt Pre medicated with hydrocortisone 100 mg iV, benadryl 50 mg PO, tylenol 650 mg PO. Labs drawn none. Infusion completed without complication or reaction. Pt reports therapy iseffective in managing symptoms related to therapy.    Kristi Wilson RN BSN  [facility]  Email: [email or web address]  Phone: [phone]

## 2022-10-13 NOTE — TELEPHONE ENCOUNTER
"Call attempted to schedule   \"same day slot on Wednesday- hospital followup   Schedule a1c, cbc, basic metabolic panel and b12 level as soon as possible preferably tomorrow or Monday\" per caroline - hayes vm     "

## 2022-10-13 NOTE — PROGRESS NOTES
This is a recent snapshot of the patient's [facility] medical record.  For current drug dose and complete information and questions, call [phone] or In Basket pool, [facility] home infusion ([id number])  CSN Number:  [id number]

## 2022-10-14 ENCOUNTER — DOCUMENTATION ONLY (OUTPATIENT)
Dept: SLEEP MEDICINE | Facility: CLINIC | Age: 32
End: 2022-10-14

## 2022-10-14 DIAGNOSIS — G47.33 OBSTRUCTIVE SLEEP APNEA (ADULT) (PEDIATRIC): ICD-10-CM

## 2022-10-14 DIAGNOSIS — G47.33 OSA (OBSTRUCTIVE SLEEP APNEA): Primary | Chronic | ICD-10-CM

## 2022-10-14 NOTE — TELEPHONE ENCOUNTER
That same day slot has been taken.  Take any same day slot and get patient in for face to face visit with me as soon as possible - schedule nonfasting labs prior to appointment with me - as soon as possible (couple days prior to visit with me)

## 2022-10-21 ENCOUNTER — VIRTUAL VISIT (OUTPATIENT)
Dept: PSYCHOLOGY | Facility: CLINIC | Age: 32
End: 2022-10-21
Payer: MEDICARE

## 2022-10-21 DIAGNOSIS — F43.21 GRIEF REACTION: Primary | ICD-10-CM

## 2022-10-21 PROCEDURE — 90837 PSYTX W PT 60 MINUTES: CPT | Mod: 95 | Performed by: SOCIAL WORKER

## 2022-10-21 NOTE — PROGRESS NOTES
[facility]                                     Progress Note    Patient Name: Hilaria Bonner  Date: 10/21/2022         Service Type: Individual      Session Start Time:12:03 Session End Time: 12:56     Session Length: 55    Session #:3    Attendees: Client    Service Modality:  Video Visit:      Provider verified identity through the following two step process.  Patient provided:  Patient photo, Patient  and Patient address    Telemedicine Visit: The patient's condition can be safely assessed and treated via synchronous audio and visual telemedicine encounter.      Reason for Telemedicine Visit: Services only offered telehealth    Originating Site (Patient Location): Patient's home    Distant Site (Provider Location): Provider Remote Setting    Consent:  The patient/guardian has verbally consented to: the potential risks and benefits of telemedicine (video visit) versus in person care; bill my insurance or make self-payment for services provided; and responsibility for payment of non-covered services.     Patient would like the video invitation sent by:  Text to cell phone: [phone]    Mode of Communication:  Video Conference via fotopedia was used after mychart failed.    As the provider I attest to compliance with applicable laws and regulations related to telemedicine.    DATA  Interactive Complexity: No  Crisis: No      Progress Since Last Session (Related to Symptoms / Goals / Homework):   Symptoms: Has been sick. was last seen in July    Homework: Partially completed      Episode of Care Goals: Minimal progress - ACTION (Actively working towards change); Intervened by reinforcing change plan / affirming steps taken     Current / Ongoing Stressors and Concerns: Patient is still with her parents. She has been wishing to return to her home. Family is helping to organize her appt before she can return. She sees is going too slowly. She feels frustration. No motivation, no energy, and  "feels not eating. She notes out of 7 days, she can tell only one was productive if she completed something like filing out a form. Has not heard from [facility] about CM. She is not sure if she can try a [facility] since it is a new service for her but will think about it. Thoughts are still \" every where and dark\". Losing the father of her children is still bothering her and making her future outlook not easy. Though no SI but just feeling sad. She also finds helpful to tell herself that \" it is going to be okay\" gave herself a  Goal to try one thing to do each day. Will review this at the next visit in 2 weeks.     Treatment Objective(s) Addressed in This Session:   use thought-stopping strategy daily to reduce intrusive thoughts  Identify negative self-talk and behaviors: challenge core beliefs, myths, and actions  increase understanding of steps in the grief process  PTSD: process past traumatic events     Intervention:     Situation        Automatic Thoughts  Cognitive Distortions      Feelings        Behavior        Questioning Thoughts          Motivational Interviewing    MI Intervention: Expressed Empathy/Understanding, Supported Autonomy, Collaboration, Evocation, Permission to raise concern or advise and Open-ended questions     Change Talk Expressed by the Patient: Taking steps    Provider Response to Change Talk: E - Evoked more info from patient about behavior change, A - Affirmed patient's thoughts, decisions, or attempts at behavior change, R - Reflected patient's change talk and S - Summarized patient's change talk statements    Assessments completed prior to visit:7/25/2022    The following assessments were completed by patient for this visit:  PHQ2:   PHQ-2 ( 1999 [employer]) 7/26/2022 5/13/2022 5/13/2022 2/15/2022 2/15/2022 11/16/2021 8/13/2021   Q1: Little interest or pleasure in doing things 3 - - - - 1 3   Q2: Feeling down, depressed or hopeless 3 - - - - 1 3   PHQ-2 Score 6 - - - - 2 6   PHQ-2 " Total Score (12-17 Years)- Positive if 3 or more points; Administer PHQ-A if positive - - - - - - 6   Q1: Little interest or pleasure in doing things Nearly every day - - - - Several days Nearly every day   Q2: Feeling down, depressed or hopeless Nearly every day - - - - Several days Nearly every day   PHQ-2 Score 6 Incomplete Incomplete Incomplete Incomplete 2 6     PHQ9:   PHQ-9 SCORE 8/13/2021 9/1/2021 2/15/2022 5/13/2022 7/21/2022 7/26/2022 9/19/2022   PHQ-9 Total Score MyChart 19 (Moderately severe depression) 20 (Severe depression) 7 (Mild depression) 7 (Mild depression) - 22 (Severe depression) 24 (Severe depression)   PHQ-9 Total Score 19 20 7 7 22 22 24   PHQ-A Total Score - - - - - - -     GAD2:   MELODY-2 7/25/2022 7/25/2022   Feeling nervous, anxious, or on edge 3 3   Not being able to stop or control worrying 3 3   MELODY-2 Total Score 6 6     GAD7:   MELODY-7 SCORE 9/1/2021 2/15/2022 5/13/2022 7/21/2022 7/25/2022 7/25/2022 9/19/2022   Total Score 15 (severe anxiety) 9 (mild anxiety) 6 (mild anxiety) - - 18 (severe anxiety) 19 (severe anxiety)   Total Score 15 9 6 18 18 18 19     CAGE-AID:   CAGE-AID Total Score 6/1/2018 7/21/2022   Total Score 0 1   Total Score MyChart 0 (A total score of 2 or greater is considered clinically significant) -     PROMIS 10-Global Health (all questions and answers displayed):   PROMIS 10 7/21/2022 9/19/2022   In general, would you say your health is: - Fair   In general, would you say your quality of life is: - Poor   In general, how would you rate your physical health? - Poor   In general, how would you rate your mental health, including your mood and your ability to think? - Poor   In general, how would you rate your satisfaction with your social activities and relationships? - Poor   In general, please rate how well you carry out your usual social activities and roles - Poor   To what extent are you able to carry out your everyday physical activities such as walking, climbing  stairs, carrying groceries, or moving a chair? - A little   How often have you been bothered by emotional problems such as feeling anxious, depressed or irritable? - Always   How would you rate your fatigue on average? - Very severe   How would you rate your pain on average?   0 = No Pain  to  10 = Worst Imaginable Pain - 8   In general, would you say your health is: 2 2   In general, would you say your quality of life is: 2 1   In general, how would you rate your physical health? 1 1   In general, how would you rate your mental health, including your mood and your ability to think? 1 1   In general, how would you rate your satisfaction with your social activities and relationships? 1 1   In general, please rate how well you carry out your usual social activities and roles. (This includes activities at home, at work and in your community, and responsibilities as a parent, child, spouse, employee, friend, etc.) 3 1   To what extent are you able to carry out your everyday physical activities such as walking, climbing stairs, carrying groceries, or moving a chair? 2 2   In the past 7 days, how often have you been bothered by emotional problems such as feeling anxious, depressed, or irritable? 5 5   In the past 7 days, how would you rate your fatigue on average? 2 5   In the past 7 days, how would you rate your pain on average, where 0 means no pain, and 10 means worst imaginable pain? 9 8   Global Mental Health Score 5 4   Global Physical Health Score 9 6   PROMIS TOTAL - SUBSCORES 14 10   Some recent data might be hidden     Lares Suicide Severity Rating Scale (Lifetime/Recent)  Lares Suicide Severity Rating (Lifetime/Recent) 3/26/2019   Q2 Suicidal Thoughts (Past Month) no   Q6 Suicide Behavior (Lifetime) no     Lares Suicide Severity Rating Scale (Short Version)  Lares Suicide Severity Rating (Short Version) 5/29/2020 6/5/2020 10/22/2020 3/22/2021 7/21/2022 7/26/2022 7/28/2022   Over the past 2 weeks  have you felt down, depressed, or hopeless? no yes yes yes - no no   Over the past 2 weeks have you had thoughts of killing yourself? no no no no - no no   Have you ever attempted to kill yourself? no no no no - no no   Q2 Suicidal Thoughts (Past Month) - - - - - - -   Q6 Suicide Behavior (Lifetime) - - - - - - -   1. Wish to be Dead (Since Last Contact) - - - - 0 - -   2. Non-Specific Active Suicidal Thoughts (Since Last Contact) - - - - 0 - -   Actual Attempt (Since Last Contact) - - - - 0 - -   Interrupted Attempts (Since Last Contact) - - - - 0 - -   Aborted or Self-Interrupted Attempt (Since Last Contact) - - - - 0 - -   Preparatory Acts or Behavior (Since Last Contact) - - - - 0 - -   Suicide (Since Last Contact) - - - - 0 - -   Calculated C-SSRS Risk Score (Since Last Contact) - - - - No Risk Indicated - -       ASSESSMENT: Current Emotional / Mental Status (status of significant symptoms):   Risk status (Self / Other harm or suicidal ideation)   Patient denies current fears or concerns for personal safety.   Patient denies current or recent suicidal ideation or behaviors.   Patient denies current or recent homicidal ideation or behaviors.   Patient denies current or recent self injurious behavior or ideation.   Patient denies other safety concerns.   Patient reports there has been no change in risk factors since their last session.     Patient reports there has been no change in protective factors since their last session.     Recommended that patient call 911 or go to the local ED should there be a change in any of these risk factors.     Appearance:   Appropriate    Eye Contact:   Good    Psychomotor Behavior: Normal    Attitude:   Cooperative    Orientation:   Person Place Time Situation   Speech    Rate / Production: Normal/ Responsive Normal     Volume:  Normal    Mood:    Anxious  Depressed    Affect:    Appropriate    Thought Content:  Clear    Thought Form:  Coherent  Logical    Insight:    Good       Medication Review:   No changes to current psychiatric medication(s)     Medication Compliance:   Yes     Changes in Health Issues:   None reported     Chemical Use Review:   Substance Use: Chemical use reviewed, no active concerns identified      Tobacco Use: No change in amount of tobacco use since last session.  Contemplation    Diagnosis:  1. Grief reaction      Collateral Reports Completed:   Routed note to PCP    PLAN: (Patient Tasks / Therapist Tasks / Other)  Patient will talk to her family when feeling sad.   Patient will stay in touch with her providers  Patient will increase positive self talk when feeling sad.   Patient will practice meditation to bring her thoughts to here.  Patient will reflect on today's discussion around her grief   Patient will try some mindfully exercise in my chart.   Patient will keep her goal to do accomplish one thing a day.   Patient's next visit is in 2 weeks..    JESSICA Reagan  ___________________________________________________________________  Individual Treatment Plan    Patient's Name: Hilaria Bonner  YOB: 1990    Date of Creation: 9/26/2022    Date Treatment Plan Last Reviewed/Revised: 9/26/2022    DSM5 Diagnoses: 296.32 (F33.1) Major Depressive Disorder, Recurrent Episode, Moderate _ and With anxious distress, 300.02 (F41.1) Generalized Anxiety Disorder or Adjustment Disorders  309.28 (F43.23) With mixed anxiety and depressed mood ;Grief reaction [F43.21]    Psychosocial / Contextual Factors:ongoing depression, anxiety, recent loss of long term partner , the father of her 2 children, strong history of child trauma, ongoing medical issues, history of trauma     PROMIS (reviewed every 90 days): 10    Referral / Collaboration:  Referral to another professional/service is not indicated at this time.    MeasurableTreatment Goal(s) related to diagnosis / functional impairment(s)  develop health cognitive patterns and beliefs about self and  "the world that lead to alleviation and help manageable daily functions.    Anticipated number of session for this episode of care: 9-12 sessions  Anticipation frequency of session: Biweekly  Anticipated Duration of each session: 53 or more minutes  Treatment plan will be reviewed in 90 days or when goals have been changed.       MeasurableTreatment Goal(s) related to diagnosis / functional impairment(s)  Goal 1: Patient will develop health cognitive patterns and beliefs about self and the world that lead to alleviation and help prevent the relapse of depression symptoms.     I will know I've met my goal when the level of my depression is reduced from 4/4 to 3/4 or better\"     Objective #A (Patient Action)                          Patient will Increase interest, engagement, and pleasure in doing things  Decrease frequency and intensity of feeling down, depressed, hopeless  Identify negative self-talk and behaviors: challenge core beliefs, myths, and actions  Improve concentration, focus, and mindfulness in daily activities .  Status: Continued - Date(s): 9/26/202  Intervention(s)  Therapist will provide provide space to express feelings and thoughts.     Objective #B  Patient will Improve quantity and quality of night time sleep / decrease daytime naps  Feel less tired and more energy during the day   Improve concentration, focus, and mindfulness in daily activities .  Status: Continued - Date(s): 9/26/2022     Intervention(s)  Therapist will assign homework : review and practice CBT skills.     Objective #C  Patient will identify at least 4 fears / thoughts that contribute to feeling anxious.  Status: Continued - Date(s): 9/26/2022  Intervention(s)  Therapist will teach distraction skills.  : self soothe using the 4 senses.     Goal 2: Patient will stabilize anxiety level while increasing ability to function on a daily basis     I will know I've met my goal when when the level of my anxiety is reduced from 4/4 to " "3/4 or better by next review.       Objective #A (Patient Action)                          Status: Continued - Date(s): 9/26/2022  Patient will use relaxation strategies 4 times per day to reduce the physical symptoms of anxiety.   Intervention(s)  Therapist will teach emotional recognition/identification. : express feelings as they present for support.     Objective #B  Patient will use thought-stopping strategy daily to reduce intrusive thoughts.                        Status: Continued - Date(s): 9/26/2022  Intervention(s)  Therapist will practice CBT/DBT/ACT skills. .     Objective #C  Patient will make a list of pros and cons for tolerating and not tolerating an urge to harm self.  Status: Continued - Date(s): 9/26/2022     Intervention(s)  Therapist will  assess for safety and reinforce safety plan    Goal 3: Patient will display an understanding of the grief process and how this process may be exacerbated by or may exacerbate mental illness symptoms.    I will know I've met my goal when I am able to express unresolved emotions regarding my loss by the next review\"     Objective #A (Patient Action)                          Patient will identify how the use of substances contributes to the avoidance of feeling associated with the loss.  Status: Continued - Date(s):9/26/2022  Intervention(s)  Therapist will provide space and time to process grief.     Goal 3: Patient will develop an understanding of how avoidance of the grief process may affect functioning in all areas of functioning.   I will know I've met my goal when I have developed alternative diversions and other coping mechanisms that are related to loss issues by the next review       Objective #A (Patient Action)                          Status: Continued - Date(s): 9/26/2022  Patient will talk to at least two others about losses and coping.  Intervention(s)  Therapist will provide some education on how to safely share feelings of loss with " others.    Patient has reviewed and agreed to the above plan .      Dai Zayas, [facility]  September 26, 2022

## 2022-10-27 NOTE — PROGRESS NOTES
This is a recent snapshot of the patient's [facility] medical record.  For current drug dose and complete information and questions, call [phone] or In Basket pool, fv home infusion ([id number])  CSN Number:  [id number]

## 2022-10-27 NOTE — PROGRESS NOTES
This is a recent snapshot of the patient's [facility] medical record.  For current drug dose and complete information and questions, call [phone] or In Basket pool, fv home infusion ([id number])  CSN Number:  [id number]

## 2022-11-04 ENCOUNTER — DOCUMENTATION ONLY (OUTPATIENT)
Dept: PSYCHOLOGY | Facility: CLINIC | Age: 32
End: 2022-11-04

## 2022-11-04 NOTE — PROGRESS NOTES
Patient cancelled her appt today because she was sick. Also wrote asking an update on her CM referral. After resending the application to the [employer], writer received a message that the CM has tried to reach the patient. She  Was sent the number to contact them.

## 2022-11-05 NOTE — PROGRESS NOTES
This is a recent snapshot of the patient's [facility] medical record.  For current drug dose and complete information and questions, call [phone] or In Basket pool, fv home infusion ([id number])  CSN Number:  [id number]

## 2022-11-08 ENCOUNTER — MYC MEDICAL ADVICE (OUTPATIENT)
Dept: FAMILY MEDICINE | Facility: CLINIC | Age: 32
End: 2022-11-08

## 2022-11-08 ENCOUNTER — DOCUMENTATION ONLY (OUTPATIENT)
Dept: SLEEP MEDICINE | Facility: CLINIC | Age: 32
End: 2022-11-08

## 2022-11-08 DIAGNOSIS — G47.33 OSA (OBSTRUCTIVE SLEEP APNEA): Primary | Chronic | ICD-10-CM

## 2022-11-08 NOTE — PROGRESS NOTES
Patient came to [facility]  for mask fitting appointment on November 8, 2022. Patient requested to switch masks because oral breathing and general discomfort . Discussed the following masks: Airfit F20 small, Airtouch F20 small, Airfit F30i small, Vitera small. Patient selected a [employer] Mask name: Vitera Full Face mask size Small

## 2022-11-09 ENCOUNTER — HOSPITAL ENCOUNTER (OUTPATIENT)
Dept: PHYSICAL THERAPY | Facility: CLINIC | Age: 32
Setting detail: THERAPIES SERIES
Discharge: HOME | End: 2022-11-09
Attending: DERMATOLOGY
Payer: MEDICARE

## 2022-11-09 ENCOUNTER — TELEPHONE (OUTPATIENT)
Dept: FAMILY MEDICINE | Facility: CLINIC | Age: 32
End: 2022-11-09

## 2022-11-09 DIAGNOSIS — I89.0 LYMPHEDEMA: ICD-10-CM

## 2022-11-09 PROCEDURE — 97140 MANUAL THERAPY 1/> REGIONS: CPT | Mod: GP | Performed by: PHYSICAL THERAPIST

## 2022-11-09 PROCEDURE — 97110 THERAPEUTIC EXERCISES: CPT | Mod: GP | Performed by: PHYSICAL THERAPIST

## 2022-11-09 PROCEDURE — 97162 PT EVAL MOD COMPLEX 30 MIN: CPT | Mod: GP | Performed by: PHYSICAL THERAPIST

## 2022-11-09 NOTE — TELEPHONE ENCOUNTER
Pt Called regarding a handicap parking permit form to be filled out from PCP pt would like to  form at clinic. Pt is requesting this to be done sooner then later if possible.     Pt's phone number is [phone]     Lola TORRES Visit Facilitator

## 2022-11-09 NOTE — PROGRESS NOTES
[facility]        OUTPATIENT PHYSICAL THERAPY EDEMA EVALUATION  PLAN OF TREATMENT FOR OUTPATIENT REHABILITATION  (COMPLETE FOR INITIAL CLAIMS ONLY)  Patient's Last Name, First Name, James Warea  R                           Provider s Name:   [facility] Medical Record No.  [id number]     Start of Care Date:  11/09/22   Onset Date:  07/14/22 (Pt reports swelling on/ off for about a year. Reports it can come and go)   Type:  PT   Medical Diagnosis:  Lymphedema   Therapy Diagnosis:  BLE edema Visits from SOC:  1                                     __________________________________________________________________________________   Plan of Treatment/Functional Goals:             GOALS  1. Goal description: Pt will obtain and utilize appropriate BLE compression garments to help prevent refill to BLE and prevent discomfort       Target date: 02/07/23  2. Goal description: Pt will demontrate accurate completion of targeted HEP to reduce and prevent BLE edema refill       Target date: 02/07/23  3. Goal description: Pt will report reduced daily impact of edema with LLIS of less than 38.       Target date: 02/07/23      Treatment Frequency: 1x/week (follow up x3 as needed)  Treatment duration: 90 days    Ysesy Lopez, PT                                    I CERTIFY THE NEED FOR THESE SERVICES FURNISHED UNDER        THIS PLAN OF TREATMENT AND WHILE UNDER MY CARE     (Physician co-signature of this document indicates review and certification of the therapy plan).                   Certification date from: 11/09/22       Certification date to: 02/07/23           Referring physician: Priscilla Wilburn MD   Initial Assessment  See Epic Evaluation- Start of care: 11/09/22

## 2022-11-10 ENCOUNTER — DOCUMENTATION ONLY (OUTPATIENT)
Dept: PHARMACY | Facility: CLINIC | Age: 32
End: 2022-11-10

## 2022-11-10 NOTE — PROGRESS NOTES
Skilled Nurse visit in the Patient Home to administer Privigen 60g.  No recent elevated temperature, fever, chills, productive cough, coughing for 3 weeks or longer or hemoptysis, abnormal vital signs, night sweats, chest pain. No  decrease in your appetite, unexplained weight loss or fatigue.  No other new onset medical symptoms.  Current weight 262 lbs.  Peripheral IVright Wrist, 1 attempt Pre medicated with Acetaminophen 650 mg by mouth, 30 minutes prior to infusion.Diphenhydramine 50 mg by mouth, 30 minutes prior to infusion.   Hydrocortisone 100 mg IV push over 2-5 minutes, 30 minutes prior to infusion.    .. Infusion completed without complication or reaction. Pt reports therapy iseffective in managing symptoms related to therapy.      Love Ribera RN, BSN  [facility]  [phone]  [email or web address]

## 2022-11-11 NOTE — TELEPHONE ENCOUNTER
dmv form completed.  There are portions of form that patient needs completed.  Please leave at  and notify patient and have copy scanned into chart

## 2022-11-17 ENCOUNTER — TELEPHONE (OUTPATIENT)
Dept: FAMILY MEDICINE | Facility: CLINIC | Age: 32
End: 2022-11-17

## 2022-11-17 NOTE — TELEPHONE ENCOUNTER
Please call patient and schedule with me same day appointment Friday November 25- emergency department followup

## 2022-11-18 ENCOUNTER — DOCUMENTATION ONLY (OUTPATIENT)
Dept: PSYCHOLOGY | Facility: CLINIC | Age: 32
End: 2022-11-18

## 2022-11-18 NOTE — PROGRESS NOTES
Patient was no show to her scheduled appt today. Patient cancelled her previous appt within the hour of her visit because she was sick..  Msg was left for her that she will be discharged if her upcoming appt of 12/06 is missed. Patient can always call  [phone] to cancel her appt. Her last visit to [facility] was on 11/15.

## 2022-11-18 NOTE — PROGRESS NOTES
This is a recent snapshot of the patient's [facility] medical record.  For current drug dose and complete information and questions, call [phone] or In Basket pool, fv home infusion ([id number])  CSN Number:  [id number]

## 2022-11-22 NOTE — PROGRESS NOTES
This is a recent snapshot of the patient's [facility] medical record.  For current drug dose and complete information and questions, call [phone] or In Basket pool, fv home infusion ([id number])  CSN Number:  [id number]

## 2022-11-28 ENCOUNTER — TELEPHONE (OUTPATIENT)
Dept: PSYCHIATRY | Facility: CLINIC | Age: 32
End: 2022-11-28

## 2022-11-28 NOTE — TELEPHONE ENCOUNTER
11/28/22: Reason for call:  Other   Patient called regarding (reason for call): Dr. Keene called on her behalf, stated that she has been admitted to the medical floor and is very ill, has sepsis. Wanted to pass on info in the event that pt misses any additional appts, as this may have contributed to her missing her last one.     Additional comments: SW team may be calling back to help pt schedule a follow up when they have a better idea of how she's doing.     Phone number to reach patient:  Other phone number:  Dr. LY, consulting psychiatry liason, called from [place], [phone]    Best Time:  Did not ask for a return call, just passing on info.     Can we leave a detailed message on this number?  Not Applicable    Travel screening: Not Applicable

## 2022-11-29 NOTE — PROGRESS NOTES
This is a recent snapshot of the patient's [facility] medical record.  For current drug dose and complete information and questions, call [phone] or In Basket pool, fv home infusion ([id number])  CSN Number:  [id number]

## 2022-12-04 DIAGNOSIS — L84 CALLUS: ICD-10-CM

## 2022-12-06 ENCOUNTER — DOCUMENTATION ONLY (OUTPATIENT)
Dept: PSYCHOLOGY | Facility: CLINIC | Age: 32
End: 2022-12-06

## 2022-12-06 ENCOUNTER — TELEPHONE (OUTPATIENT)
Dept: FAMILY MEDICINE | Facility: CLINIC | Age: 32
End: 2022-12-06

## 2022-12-06 DIAGNOSIS — E66.813 CLASS 3 SEVERE OBESITY DUE TO EXCESS CALORIES WITH SERIOUS COMORBIDITY AND BODY MASS INDEX (BMI) OF 45.0 TO 49.9 IN ADULT (H): ICD-10-CM

## 2022-12-06 DIAGNOSIS — E66.01 CLASS 3 SEVERE OBESITY DUE TO EXCESS CALORIES WITH SERIOUS COMORBIDITY AND BODY MASS INDEX (BMI) OF 45.0 TO 49.9 IN ADULT (H): ICD-10-CM

## 2022-12-06 DIAGNOSIS — I10 BENIGN ESSENTIAL HYPERTENSION: ICD-10-CM

## 2022-12-06 DIAGNOSIS — Z98.84 S/P LAPAROSCOPIC SLEEVE GASTRECTOMY: ICD-10-CM

## 2022-12-06 NOTE — TELEPHONE ENCOUNTER
Needs a follow up with me- potassium was critically low during emergency department visit.  I have 2 appointments tomorrow- needs to be seen in person   Crissy Madrigal, PAC

## 2022-12-06 NOTE — LETTER
December 12, 2022      Hilaria Bonner  [address]  [address]  [place] [address]        Dear Hilaria,     We have been attempted to reach you by phone.  It is critical that you be seen in clinic ASAP.  Please call and schedule an appointment and advise that I have approved a same day slot for you.       Sincerely,        Crissy Madrigal PA-C

## 2022-12-06 NOTE — TELEPHONE ENCOUNTER
Tried to call pt again but no answer and no way to LVM. Sent Squrl message as well. Will f/u tomorrow.

## 2022-12-06 NOTE — PROGRESS NOTES
Patient was no show. Writer made attempt to call her but no option to leave a message. It was just saying that the person is not available at this time, call later.

## 2022-12-07 NOTE — TELEPHONE ENCOUNTER
Attempted to reach pt 3 times. No answer and unable to VM. Will send pt mychart msg as well. Looks like pt has upcoming appt in Jan.  Next 5 appointments (look out 90 days)    Jan 13, 2023  1:30 PM  (Arrive by 1:10 PM)  Provider Visit with Crissy Madrigal PA-C  [facility] ([facility] ) [address] [place] [id number] [phone]        Per T.C protocol Will close encounter as 3 attempts have been made to reach pt and send to PCP as FYI.   Yola Ríos, CMA

## 2022-12-07 NOTE — TELEPHONE ENCOUNTER
Needs follow up with me ASAP- same day ok.  Potassium was critically low in emergency department.  Is she taking this medication as prescribed?

## 2022-12-07 NOTE — TELEPHONE ENCOUNTER
Recieved refill request for Prilosec and Calcium-VitD . Patient needs appointment scheduled prior to any refills. Clinic Coordinator notified and will follow up with the patient as appropriate. The pharmacy has been notified that the medication will not be refilled prior to an appointment being scheduled.

## 2022-12-08 RX ORDER — AMMONIUM LACTATE 12 G/100G
CREAM TOPICAL
Qty: 385 G | Refills: 0 | Status: SHIPPED | OUTPATIENT
Start: 2022-12-08 | End: 2023-11-30

## 2022-12-08 NOTE — TELEPHONE ENCOUNTER
"RN refilled medication per [facility] Refill Protocol.     Jaqueline YEPEZ RN      Requested Prescriptions   Pending Prescriptions Disp Refills     ammonium lactate (AMLACTIN) 12 % external cream [Pharmacy Med Name: AMMONIUM LACTATE 12% CREAM 385GM] 385 g 0     Sig: APPLY TOPICALLY TO THE AFFECTED AREA TWICE DAILY       Miscellaneous Dermatologic Agents Passed - 12/4/2022  5:54 AM        Passed - Recent (12 mo) or future (30 days) visit within the authorizing provider's specialty     Patient has had an office visit with the authorizing provider or a provider within the authorizing providers department within the previous 12 mos or has a future within next 30 days. See \"Patient Info\" tab in inbasket, or \"Choose Columns\" in Meds & Orders section of the refill encounter.              Passed - Refill request is not for Imiquimod, 5-Fluorouracil, or Finasteride      If Imiquimod, 5-Fluorouracil, or Finasteride, may refill if indicated in progress notes.           Passed - Medication is active on med list        Passed - Patient is 24 mos old or older             "

## 2022-12-10 RX ORDER — POTASSIUM CHLORIDE 750 MG/1
TABLET, EXTENDED RELEASE ORAL
Qty: 120 TABLET | Refills: 0 | OUTPATIENT
Start: 2022-12-10

## 2022-12-12 NOTE — TELEPHONE ENCOUNTER
Tried to call pt, but no answer and no way to LVM. I think this is about the 3rd or 4th time reaching out to her about this.. do you want to send a letter in the mail regarding this? A bit time sensitive but she has not answered and she made an appt for 1/13 but that is too far out.

## 2022-12-13 ENCOUNTER — HOME INFUSION (PRE-WILLOW HOME INFUSION) (OUTPATIENT)
Dept: PHARMACY | Facility: CLINIC | Age: 32
End: 2022-12-13

## 2022-12-22 NOTE — PROGRESS NOTES
This is a recent snapshot of the patient's [facility] medical record.  For current drug dose and complete information and questions, call [phone] or In Basket pool, [facility] home infusion ([id number])  CSN Number:  [id number]

## 2022-12-23 ENCOUNTER — DOCUMENTATION ONLY (OUTPATIENT)
Dept: PSYCHOLOGY | Facility: CLINIC | Age: 32
End: 2022-12-23

## 2022-12-23 NOTE — PROGRESS NOTES
"Unable to reach the patient; \"the call can not be completed. Please hang up and to call again later\"   Noted patient has been in the hospital else where    "

## 2023-01-01 NOTE — TELEPHONE ENCOUNTER
33 day old, F presented with unresponsiveness, decreased PO intake x 1 day, patient noted to be listless and lethargic at pediatrician office, noted to have possible seizure like activity. Intubated on arrival  CT head showed Acute right frontal temporal acute subdural with midline shift, bilateral infarcts, uncal herniation.    11/6 emergent OR for right decompressive hemicraniectomy, bone flap discarded, Post op CT showed good decompression  11/8 Ophtho exam + retinal heme, VEEG + seizures on Keppra, Vimpat and Versed for burst suppression, MRI/A/V, MR spine-P, RAMONITA drain 3.6cc  11/9 RAMONITA minimal output. exam stable   11/10 RAMONITA minimal output, flap soft   11/11 RAMONITA drain removed, MRI brain/Spine- significant hypoxic ischemic injury, significant increase in ventricular size, + high cervical spine injury, Non-occlusive thrombus of sagittal/transverse sinus. EVD placed due to hydocephalus  11/12 EVD at 10cm H20, patent, approx 5cc/hr. Flap soft.   11/13 EVD at 10cm h20 patent. Exam stable. Flap soft. Plan for CTH today   11/14 EVD was not working overnight, flushed and became patent. CTH today is stable.   11/15 OR for removal of EVD and insertion of Lt frontal VPS (Strata 2.0 set at 1.5)   I printed and signed letter, placed in team basket    Please fax    Jose Zepeda MD

## 2023-01-09 ENCOUNTER — TELEPHONE (OUTPATIENT)
Dept: SLEEP MEDICINE | Facility: CLINIC | Age: 33
End: 2023-01-09

## 2023-01-09 NOTE — TELEPHONE ENCOUNTER
Nurse care coordinator from [facility] called saying that patient is discharging from extended stay at hospital and is wondering if patient needs a follow up appointment for sleep. Reviewed chart and patient failed to meet compliance for CPAP and has Medicare. Shared that patient will need to have an appt and likely a repeat psg if they want to continue having coverage for CPAP

## 2023-01-10 ENCOUNTER — TELEPHONE (OUTPATIENT)
Dept: NEUROLOGY | Facility: CLINIC | Age: 33
End: 2023-01-10

## 2023-01-10 DIAGNOSIS — G61.81 CHRONIC INFLAMMATORY DEMYELINATING POLYNEUROPATHY (H): Primary | ICD-10-CM

## 2023-01-10 RX ORDER — HEPARIN SODIUM (PORCINE) LOCK FLUSH IV SOLN 100 UNIT/ML 100 UNIT/ML
5 SOLUTION INTRAVENOUS
Status: CANCELLED | OUTPATIENT
Start: 2023-01-10

## 2023-01-10 RX ORDER — ACETAMINOPHEN 325 MG/1
650 TABLET ORAL ONCE
Status: CANCELLED
Start: 2023-01-10

## 2023-01-10 RX ORDER — MEPERIDINE HYDROCHLORIDE 25 MG/ML
25 INJECTION INTRAMUSCULAR; INTRAVENOUS; SUBCUTANEOUS EVERY 30 MIN PRN
Status: CANCELLED | OUTPATIENT
Start: 2023-01-10

## 2023-01-10 RX ORDER — ALBUTEROL SULFATE 90 UG/1
1-2 AEROSOL, METERED RESPIRATORY (INHALATION)
Status: CANCELLED
Start: 2023-01-10

## 2023-01-10 RX ORDER — DIPHENHYDRAMINE HYDROCHLORIDE 50 MG/ML
50 INJECTION INTRAMUSCULAR; INTRAVENOUS
Status: CANCELLED
Start: 2023-01-10

## 2023-01-10 RX ORDER — HEPARIN SODIUM,PORCINE 10 UNIT/ML
5 VIAL (ML) INTRAVENOUS
Status: CANCELLED | OUTPATIENT
Start: 2023-01-10

## 2023-01-10 RX ORDER — DIPHENHYDRAMINE HCL 25 MG
50 CAPSULE ORAL ONCE
Status: CANCELLED | OUTPATIENT
Start: 2023-01-10

## 2023-01-10 RX ORDER — METHYLPREDNISOLONE SODIUM SUCCINATE 125 MG/2ML
125 INJECTION, POWDER, LYOPHILIZED, FOR SOLUTION INTRAMUSCULAR; INTRAVENOUS
Status: CANCELLED
Start: 2023-01-10

## 2023-01-10 RX ORDER — ALBUTEROL SULFATE 0.83 MG/ML
2.5 SOLUTION RESPIRATORY (INHALATION)
Status: CANCELLED | OUTPATIENT
Start: 2023-01-10

## 2023-01-10 RX ORDER — EPINEPHRINE 1 MG/ML
0.3 INJECTION, SOLUTION, CONCENTRATE INTRAVENOUS EVERY 5 MIN PRN
Status: CANCELLED | OUTPATIENT
Start: 2023-01-10

## 2023-01-10 NOTE — TELEPHONE ENCOUNTER
Prior Authorization Infusion/Clinic Administered Request    Location: [facility]  Diagnosis and ICD:CIDP  Drug/Therapy: IVIG; 1 g/kg every 28 days     Previously Tried and Failed Therapies:     Date of provider note with supporting information: 5/9/22    Urgency (When is the patient scheduled?): ASAP    Would you like to include any research articles?         If yes please call [phone] for further instructions about sending that information

## 2023-01-16 ENCOUNTER — TELEPHONE (OUTPATIENT)
Dept: FAMILY MEDICINE | Facility: CLINIC | Age: 33
End: 2023-01-16

## 2023-01-16 ENCOUNTER — TELEPHONE (OUTPATIENT)
Dept: FAMILY MEDICINE | Facility: CLINIC | Age: 33
End: 2023-01-16
Payer: MEDICARE

## 2023-01-16 NOTE — TELEPHONE ENCOUNTER
"RN called number provided below for Marifer, number was actually for patient. RN spoke with patient and asked if she had Marifer's number, patient states her mom does and gave me permission to speak with her mom. RN spoke with patient's mom and obtained number for Marifer Occupational Therapist ([phone]) as well as patient's home care nurse, Lola, ([phone]).    Patient's mother requesting to know if there is a pain medication that can be prescribed for patient \"because with the wound vac and everything that is going on she is in a lot of pain. I believe they prescribed her Trinidone when she was in the hospital and she said that helped a lot\". RN informed patient's mother that a request can be sent but most likely she will need to discuss pain management during hospital follow up appointment on 1/25/2023. Patient's mom verbalized understanding.     RN called Marifer with patient's home care and left detailed message relaying provider approval for requested home care orders below. If marifer calls back please confirm she received message.    Routing to provider to review and advise on request for pain medication.       "

## 2023-01-16 NOTE — TELEPHONE ENCOUNTER
Reason for Call: Request for an order or referral:    Order or referral being requested: Orders are for OT two times a week for one week. And 1 time a week for three weeks. This will be for ADL and home exercise.     Date needed: as soon as possible    Has the patient been seen by the PCP for this problem? Not Applicable    Additional comments: Marifer from [email or web address] [facility] called regarding orders for Pt.     Phone number Patient can be reached at:  Cell number on file:    Telephone Information:   Mobile [phone]       Can we leave a detailed message on this number?  YES    Call taken on 1/16/2023 at 2:10 PM by Lola Daniel

## 2023-01-16 NOTE — TELEPHONE ENCOUNTER
Since I don't know her I don't feel comfortable rxing pain med without visit but will send note to pcp who knows her well to review.     Or, perhaps needs sooner visit if pain is not managed?

## 2023-01-16 NOTE — TELEPHONE ENCOUNTER
Forms/Letter Request    Type of form/letter: forms recieved from [facility]   placed on providers desk for response     Have you been seen for this request:  Do we have the form/letter:  When is form/letter needed by:    How would you like the form/letter returned: fax to [phone]  Patient Notified form requests are processed in 3-5 business days    Could we send this information to you in [employer] or would you prefer to receive a phone call?:

## 2023-01-17 NOTE — TELEPHONE ENCOUNTER
RN called patient and LVM to call clinic at [phone].    If patient calls back please relay provider message below, appointment will be needed for pain medication to be prescribed. Can assist in scheduling a sooner appointment for Hospital follow up and to discuss pain management.    Fatoumata Mendoza RN    [facility]

## 2023-01-17 NOTE — TELEPHONE ENCOUNTER
Reviewed request with her pcp GUY Diamond .  Agree that needs to be seen to address pain mgmt. Consider moving up her appt if needed

## 2023-01-18 ENCOUNTER — TELEPHONE (OUTPATIENT)
Dept: FAMILY MEDICINE | Facility: CLINIC | Age: 33
End: 2023-01-18
Payer: MEDICARE

## 2023-01-18 DIAGNOSIS — L89.229 PRESSURE INJURY OF SKIN OF LEFT HIP, UNSPECIFIED INJURY STAGE: Primary | ICD-10-CM

## 2023-01-18 RX ORDER — OXYCODONE AND ACETAMINOPHEN 5; 325 MG/1; MG/1
1 TABLET ORAL EVERY 6 HOURS PRN
Qty: 10 TABLET | Refills: 0 | Status: SHIPPED | OUTPATIENT
Start: 2023-01-18 | End: 2023-01-21

## 2023-01-18 NOTE — TELEPHONE ENCOUNTER
Ok for speech therapy as requested  Willing to give 10 percocet to make it an appointment - I need pharmacy

## 2023-01-18 NOTE — TELEPHONE ENCOUNTER
RN called patient and LVM to call clinic at [phone].     If patient calls back please relay provider message below, appointment will be needed for pain medication to be prescribed. Can assist in scheduling a sooner appointment for Hospital follow up and to discuss pain management.     Fatoumata Mendoza RN     [facility]

## 2023-01-18 NOTE — TELEPHONE ENCOUNTER
Please notify prescription for 10 tablets sent- to emergency department if chest pain or shortness of breath

## 2023-01-18 NOTE — TELEPHONE ENCOUNTER
RN called patient and LVM to call clinic at [phone].    If patient calls back please relay provider message below.    Fatoumata Mendoza RN    [facility]

## 2023-01-18 NOTE — TELEPHONE ENCOUNTER
mAeena, patient's speech pathologist at [facility], calling to request verbal orders and providing update on patient.     Order: Speech therapy 2x/week for two weeks and 1x/week for two weeks for cognition.    Ameena reports that during her visit with patient today, patient's heart rate was elevated. Resting -120 bpm. Due to HR being outside of parameter, they are to update the PCP. They believe that elevated HR may have to do with patient's pain. See also telephone encounter regarding pain 1/16/2023.    Ameena requesting that patient or patient's family be contacted for any updates from provider about patient's HR and pain management. Ameena will not see patient again until next Wednesday.     Please call Ameena back at [phone] with updates on verbal order.    Routing to provider to review and advise.    Dorothy Nash RN  [facility]

## 2023-01-18 NOTE — TELEPHONE ENCOUNTER
RN called Ameena and left detailed message relaying provider approval for requested home care orders.    RN called patient and confirmed which pharmacy medication should be sent to. RN also able to get appointment scheduled sooner for hospital f/u, appointment scheduled for 1/20/2023.    Routing to provider to review and advise, pharmacy queued with medication order ([facility] in [place], [place]).    Fatoumata Mendoza RN    [facility]

## 2023-01-18 NOTE — TELEPHONE ENCOUNTER
RN called patient in a different telephone encounter. Appointment scheduled for 1/20/23 for hospital f/u and pain management.    Fatoumata Mendoza RN    [facility]

## 2023-01-19 ENCOUNTER — TELEPHONE (OUTPATIENT)
Dept: FAMILY MEDICINE | Facility: CLINIC | Age: 33
End: 2023-01-19
Payer: MEDICARE

## 2023-01-19 NOTE — TELEPHONE ENCOUNTER
RN called patient, unable to leave message as VM was full.    If patient calls back please relay provider message below.    Fatoumata Mendoza RN    [facility]

## 2023-01-19 NOTE — TELEPHONE ENCOUNTER
Forms/Letter Request    Type of form/letter: [facility] Health forms need to be signed placed on providers desk for response.     Have you been seen for this request: N/A    Do we have the form/letter: Yes:     When is form/letter needed by: asap    How would you like the form/letter returned: Fax [phone]

## 2023-01-20 ENCOUNTER — TELEPHONE (OUTPATIENT)
Dept: FAMILY MEDICINE | Facility: CLINIC | Age: 33
End: 2023-01-20

## 2023-01-20 NOTE — TELEPHONE ENCOUNTER
Attempted to reach Pt no access to leave message phone inactive. Pt's status on Mychart was last used in 11/15/22 no message sent.       Lola Wick Facilitator

## 2023-01-20 NOTE — TELEPHONE ENCOUNTER
Forms/Letter Request    Type of form/letter: [facility] health forms signed will fax.     Have you been seen for this request: N/A    Do we have the form/letter: Yes:     When is form/letter needed by: asap    How would you like the form/letter returned: Fax number- [phone]

## 2023-01-20 NOTE — TELEPHONE ENCOUNTER
RN called patient and relayed provider message below. Patient verbalized understanding and agreed to plan. RN confirmed appointment time for 01/20/23 for patient.     No further questions or concerns at this time.     Fatoumata Mendoza RN    [facility]

## 2023-01-23 ENCOUNTER — TELEPHONE (OUTPATIENT)
Dept: FAMILY MEDICINE | Facility: CLINIC | Age: 33
End: 2023-01-23

## 2023-01-23 NOTE — TELEPHONE ENCOUNTER
Forms/Letter Request    Type of form/letter: [facility] Health forms need to be signed placed on providers desk for response.     Have you been seen for this request: N/A    Do we have the form/letter: Yes:     When is form/letter needed by: asap    How would you like the form/letter returned: Fax number- [phone]

## 2023-01-24 ENCOUNTER — OFFICE VISIT (OUTPATIENT)
Dept: FAMILY MEDICINE | Facility: CLINIC | Age: 33
End: 2023-01-24
Payer: MEDICARE

## 2023-01-24 ENCOUNTER — TELEPHONE (OUTPATIENT)
Dept: FAMILY MEDICINE | Facility: CLINIC | Age: 33
End: 2023-01-24

## 2023-01-24 VITALS
TEMPERATURE: 98.4 F | DIASTOLIC BLOOD PRESSURE: 82 MMHG | SYSTOLIC BLOOD PRESSURE: 134 MMHG | OXYGEN SATURATION: 97 % | HEART RATE: 114 BPM | RESPIRATION RATE: 22 BRPM

## 2023-01-24 DIAGNOSIS — K86.89 PANCREATIC INSUFFICIENCY: ICD-10-CM

## 2023-01-24 DIAGNOSIS — F10.96 WERNICKE-KORSAKOFF SYNDROME (ALCOHOLIC) (H): ICD-10-CM

## 2023-01-24 DIAGNOSIS — D50.0 IRON DEFICIENCY ANEMIA DUE TO CHRONIC BLOOD LOSS: ICD-10-CM

## 2023-01-24 DIAGNOSIS — L89.226 PRESSURE INJURY OF DEEP TISSUE OF LEFT HIP: ICD-10-CM

## 2023-01-24 DIAGNOSIS — E11.65 TYPE 2 DIABETES MELLITUS WITH HYPERGLYCEMIA, WITHOUT LONG-TERM CURRENT USE OF INSULIN (H): ICD-10-CM

## 2023-01-24 DIAGNOSIS — F41.1 GAD (GENERALIZED ANXIETY DISORDER): ICD-10-CM

## 2023-01-24 DIAGNOSIS — G47.33 OSA (OBSTRUCTIVE SLEEP APNEA): Chronic | ICD-10-CM

## 2023-01-24 DIAGNOSIS — E11.65 TYPE 2 DIABETES MELLITUS WITH HYPERGLYCEMIA, WITHOUT LONG-TERM CURRENT USE OF INSULIN (H): Primary | ICD-10-CM

## 2023-01-24 DIAGNOSIS — E66.01 MORBID OBESITY (H): Chronic | ICD-10-CM

## 2023-01-24 DIAGNOSIS — I10 BENIGN ESSENTIAL HYPERTENSION: ICD-10-CM

## 2023-01-24 DIAGNOSIS — F29 PSYCHOSIS, UNSPECIFIED PSYCHOSIS TYPE (H): ICD-10-CM

## 2023-01-24 LAB
ALBUMIN SERPL-MCNC: 2.9 G/DL (ref 3.4–5)
ALP SERPL-CCNC: 200 U/L (ref 40–150)
ALT SERPL W P-5'-P-CCNC: 19 U/L (ref 0–50)
ANION GAP SERPL CALCULATED.3IONS-SCNC: 5 MMOL/L (ref 3–14)
AST SERPL W P-5'-P-CCNC: ABNORMAL U/L
BASOPHILS # BLD AUTO: 0 10E3/UL (ref 0–0.2)
BASOPHILS NFR BLD AUTO: 1 %
BILIRUB SERPL-MCNC: 0.3 MG/DL (ref 0.2–1.3)
BUN SERPL-MCNC: 8 MG/DL (ref 7–30)
CALCIUM SERPL-MCNC: 9.9 MG/DL (ref 8.5–10.1)
CHLORIDE BLD-SCNC: 107 MMOL/L (ref 94–109)
CO2 SERPL-SCNC: 29 MMOL/L (ref 20–32)
CREAT SERPL-MCNC: 0.57 MG/DL (ref 0.52–1.04)
EOSINOPHIL # BLD AUTO: 0.1 10E3/UL (ref 0–0.7)
EOSINOPHIL NFR BLD AUTO: 1 %
ERYTHROCYTE [DISTWIDTH] IN BLOOD BY AUTOMATED COUNT: 15.4 % (ref 10–15)
GFR SERPL CREATININE-BSD FRML MDRD: >90 ML/MIN/1.73M2
GLUCOSE BLD-MCNC: 135 MG/DL (ref 70–99)
HBA1C MFR BLD: 6 % (ref 0–5.6)
HCT VFR BLD AUTO: 40.6 % (ref 35–47)
HGB BLD-MCNC: 12.2 G/DL (ref 11.7–15.7)
IMM GRANULOCYTES # BLD: 0 10E3/UL
IMM GRANULOCYTES NFR BLD: 0 %
LYMPHOCYTES # BLD AUTO: 1.8 10E3/UL (ref 0.8–5.3)
LYMPHOCYTES NFR BLD AUTO: 26 %
MCH RBC QN AUTO: 28.6 PG (ref 26.5–33)
MCHC RBC AUTO-ENTMCNC: 30 G/DL (ref 31.5–36.5)
MCV RBC AUTO: 95 FL (ref 78–100)
MONOCYTES # BLD AUTO: 0.4 10E3/UL (ref 0–1.3)
MONOCYTES NFR BLD AUTO: 5 %
NEUTROPHILS # BLD AUTO: 4.4 10E3/UL (ref 1.6–8.3)
NEUTROPHILS NFR BLD AUTO: 67 %
PLATELET # BLD AUTO: 439 10E3/UL (ref 150–450)
POTASSIUM BLD-SCNC: 4.5 MMOL/L (ref 3.4–5.3)
PROT SERPL-MCNC: 7.9 G/DL (ref 6.8–8.8)
RBC # BLD AUTO: 4.27 10E6/UL (ref 3.8–5.2)
SODIUM SERPL-SCNC: 141 MMOL/L (ref 133–144)
WBC # BLD AUTO: 6.6 10E3/UL (ref 4–11)

## 2023-01-24 PROCEDURE — 82040 ASSAY OF SERUM ALBUMIN: CPT | Performed by: NURSE PRACTITIONER

## 2023-01-24 PROCEDURE — 85025 COMPLETE CBC W/AUTO DIFF WBC: CPT | Performed by: NURSE PRACTITIONER

## 2023-01-24 PROCEDURE — 36415 COLL VENOUS BLD VENIPUNCTURE: CPT | Performed by: NURSE PRACTITIONER

## 2023-01-24 PROCEDURE — 80048 BASIC METABOLIC PNL TOTAL CA: CPT | Performed by: NURSE PRACTITIONER

## 2023-01-24 PROCEDURE — 84460 ALANINE AMINO (ALT) (SGPT): CPT | Performed by: NURSE PRACTITIONER

## 2023-01-24 PROCEDURE — 84155 ASSAY OF PROTEIN SERUM: CPT | Performed by: NURSE PRACTITIONER

## 2023-01-24 PROCEDURE — 83036 HEMOGLOBIN GLYCOSYLATED A1C: CPT | Performed by: NURSE PRACTITIONER

## 2023-01-24 PROCEDURE — 99214 OFFICE O/P EST MOD 30 MIN: CPT | Performed by: NURSE PRACTITIONER

## 2023-01-24 PROCEDURE — 82247 BILIRUBIN TOTAL: CPT | Performed by: NURSE PRACTITIONER

## 2023-01-24 PROCEDURE — 84075 ASSAY ALKALINE PHOSPHATASE: CPT | Performed by: NURSE PRACTITIONER

## 2023-01-24 RX ORDER — VENLAFAXINE HYDROCHLORIDE 150 MG/1
150 CAPSULE, EXTENDED RELEASE ORAL DAILY
Qty: 90 CAPSULE | Refills: 1 | Status: SHIPPED | OUTPATIENT
Start: 2023-01-24 | End: 2023-02-28

## 2023-01-24 RX ORDER — METFORMIN HCL 500 MG
500 TABLET, EXTENDED RELEASE 24 HR ORAL
Qty: 90 TABLET | Refills: 1 | Status: SHIPPED | OUTPATIENT
Start: 2023-01-24 | End: 2023-02-28

## 2023-01-24 RX ORDER — AMOXICILLIN 250 MG
1 CAPSULE ORAL
COMMUNITY
Start: 2023-01-10 | End: 2023-04-20

## 2023-01-24 RX ORDER — PREGABALIN 200 MG/1
200 CAPSULE ORAL
COMMUNITY
Start: 2023-01-10 | End: 2023-02-28

## 2023-01-24 ASSESSMENT — ENCOUNTER SYMPTOMS
FATIGUE: 1
DECREASED CONCENTRATION: 1
CHILLS: 0
COUGH: 0
WOUND: 1
SLEEP DISTURBANCE: 1
DIARRHEA: 1
FEVER: 0
BACK PAIN: 1
SHORTNESS OF BREATH: 0
ARTHRALGIAS: 1
NERVOUS/ANXIOUS: 0

## 2023-01-24 ASSESSMENT — PATIENT HEALTH QUESTIONNAIRE - PHQ9
SUM OF ALL RESPONSES TO PHQ QUESTIONS 1-9: 8
10. IF YOU CHECKED OFF ANY PROBLEMS, HOW DIFFICULT HAVE THESE PROBLEMS MADE IT FOR YOU TO DO YOUR WORK, TAKE CARE OF THINGS AT HOME, OR GET ALONG WITH OTHER PEOPLE: SOMEWHAT DIFFICULT
SUM OF ALL RESPONSES TO PHQ QUESTIONS 1-9: 8

## 2023-01-24 ASSESSMENT — PAIN SCALES - GENERAL: PAINLEVEL: EXTREME PAIN (8)

## 2023-01-24 NOTE — Clinical Note
Aleksey oTrres  Wanted you to be aware of her discharge home Mom is managing her medications  Some question related to her medication as was adjusted in the hospital  Looks like was on 225 mg before and they backed it down to 150 mg daily  I think abilify looks like was adjusted as well  I know they have an appointment pending with neuropsych I believe in March with gertrude Barone  They will be reaching out to you as well for follow up  Thank you  Kylah Moseley, ASHLEY, APRN CNP

## 2023-01-24 NOTE — PATIENT INSTRUCTIONS
PLAN:   1.   Symptomatic therapy suggested: Continue current medications as prescribed.   2.  Orders Placed This Encounter   Medications    apixaban ANTICOAGULANT (ELIQUIS) 5 MG tablet     Sig: Take 1 tablet (5 mg) by mouth 2 times daily     Dispense:  60 tablet     Refill:  3    metFORMIN (GLUCOPHAGE XR) 500 MG 24 hr tablet     Sig: Take 1 tablet (500 mg) by mouth daily (with dinner)     Dispense:  90 tablet     Refill:  1    venlafaxine (EFFEXOR XR) 150 MG 24 hr capsule     Sig: Take 1 capsule (150 mg) by mouth daily     Dispense:  90 capsule     Refill:  1     Orders Placed This Encounter   Procedures    Comprehensive metabolic panel    Hemoglobin A1c    CBC with Platelets & Differential       3. Patient needs to follow up in if no improvement,or sooner if worsening of symptoms or other symptoms develop.  Make follow up appointment with her psychiatrist

## 2023-01-24 NOTE — TELEPHONE ENCOUNTER
RN called patient's home care nurse per provider request from patient's visit 01/24/23.       TEN French verbalized good understanding. She is requesting that the most recent medication list be fax to [phone] Attention Gillian Jeffery. Routing to  to assist in faxing most recent medication list.     Gillian is also stating that patient needs an order for glucometer and lancets, patient does not know where the last glucometer went. Glucometer was last prescribed 10/3/2022. Gillian states she can complete education on how to check BG with glucometer with patient's mother and the patient.      Routing to provider to review and advise on requested orders for glucometer.    Fatoumata Mendoza RN    [facility]

## 2023-01-24 NOTE — PROGRESS NOTES
Can we call home health and have them go through her medications with her mother so that they match what we have in the chart?  I adjusted her metfomin and effexor with what she was discharged on   Looks like her Robaxin was discontinued so took that off  However mom states there were things she was taking before that are not on her list now

## 2023-01-24 NOTE — TELEPHONE ENCOUNTER
Called Gillian and relayed provider's message below as well as informing med list was faxed. Gillian verbalized understanding.    TEN Ortega  [facility]

## 2023-01-24 NOTE — PROGRESS NOTES
Subjective   Hilaria is a 32 year old accompanied by her mother, presenting for the following health issues:  Hospital F/U      [facility]       Hospital Follow-up Visit:    Hospital/Nursing Home/IP Rehab Facility: [facility] then moved to [facility]  Date of Admission: Second weekend of nov  Date of Discharge: From [facility] Jan 11 2023  Reason(s) for Admission:     Was your hospitalization related to COVID-19? 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 and debliity caused by Acute severe sepsis with lactic acidosis, acute respiratory insufficiency, and Acute community-acquired pneumonia  She has had extended course of rehabilitation and was discharged home with home health   She has considerable decline in mental acuity and further evaluation pending with neurology, neuropsch and also psychiatry   Mother is taking on the caretaker role of her medications as patient unable to do so at this point       Summary of hospitalization:  [facility] information obtained and reviewed    [facility] DISCHARGE SUMMARY  ACUTE INPATIENT REHABILITATION     Patient: Hilaria Bonner YOB: 1990 MRN: [id number]  Primary Physician: GUY Dos Santos   Attending Physician: Bebo Esqueda DO    Admission Date: 12/20/2022   Discharge Date: 1/11/2023    Admission Diagnosis: 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 This has resulted in the following impairments: Generalized weakness, physical deconditioning, activity intolerance, cognitive deficits causing the following activity limitations: Impaired mobility/balance, impaired ADLs/IADLs, impaired cognition, impaired activity tolerance.    COMORBIDITIES  Patient Active Problem List   Diagnosis Code     History of acute massive pulmonary embolism I26.99     Acute pancreatitis with uninfected necrosis K85.91     Altered mental status R41.82     Anxiety F41.9     History of PEA arrest due to PE I46.9     CIDP (chronic inflammatory demyelinating polyneuropathy) (HC) G61.81     Hemorrhoids K64.9     History of morbid obesity Z86.39     Menorrhagia with irregular cycle N92.1     Obesity, Class III, BMI 40-49.9 (morbid obesity) (HC) E66.01     Polyneuropathy G62.9     S/P laparoscopic sleeve gastrectomy Z98.84     Secondary hyperparathyroidism, not elsewhere classified (HC) E21.1     Sleep disturbances G47.9     VT (ventricular tachycardia) I47.20     Alcohol use Z78.9     Medical cannabis use Z79.899     Tobacco use Z72.0     Closed fracture of multiple pubic rami, right, initial encounter (HC) S32.591A     Depression F32.A     Wernicke-Korsakoff syndrome (alcoholic) (HC) F10.96     Compression fracture of T9 vertebra with routine healing S22.070D     Pancreatic insufficiency K86.89     Decubitus ulcer of left hip L89.229     Unspecified mental disorder due to known physiological condition F09     BRIEF HISTORY OF PRESENT ILLNESS:   Hilaria Bonner is a 31 y.o. female with past medical history of diabetes mellitus  type 2, morbid obesity, prior pulmonary embolism with PEA arrest (now on lifelong anticoagulation), CIDP post covid19 with chronic pain syndrome (COVID 4/2020, CIDP 5/2020), gastric bypass, anxiety and depression, obstructive sleep apnea, tobacco use, alcoholic pancreatitis who presented to [facility] ED via EMS on 11/27/22 with 3 day history of generalized weakness, poor appetite, cough, shortness of breath, urinary incontinence, N/V, lightheadedness, and tachycardia.      Of note, she and her children are currently living with her parents. Also lost her significant other in February of this year.      In ED, labs remarkable for elevated wbc, abnormal drug screen (for amphetamine, benzodiazepines however confirmation tests later were negative so findings were a false positive), elevated lactate. Given IV fluids with some improvements in mental status. XR chest with significant left upper lobe infiltrate. CT head showed active sinus disease but no acute findings in brain. CT cervical spine unremarkable. CT C/A/P with left upper and lower lobe pneumonia, moderate T9 compression fracture (new since 2/6/21), age-indeterminate mild T6 compression fracture, diffuse hepatic steatosis, and new cystic peripancreatic masses likely pseudocysts. Started on IV antibiotics for severe sepsis of unknown source, likely pneumonia and lactic acidosis. Required supplemental oxygen placement. Influenza and covid test negative. Admitted to hospital.     Psychiatry followed and added abilify (later titrated) and home Effexor increased. She remained confused felt likely delirium in setting of sepsis in patient with pre-existing neurocognitive compromise from prior pulseless electrical activity arrest and post covid syndrome. Recommended for neuropsychiatric testing outpatient and alcohol abstinence. Started on high dose thiamine to see if confusion improves with treatment as thought possible secondary to Wernicke s encephalopathy. AST  and GGT elevated giving likelihood patient may have been consistently drinking alcohol prior to admission. Ammonia and TSH normal. Trial of lorazepam worsened confusion and hallucinations so was stopped. Auditory and visual hallucinations improved. Mother later confirmed patient was clearly drinking prior to hospitalization. Encephalopathy felt to be Wernicke-Korsakoff s syndrome. IV thiamine transitioned to oral thiamine. She developed extremity tremors so abilify was reduced. Later Abilify increased back up due to increased agitation and restlessness. Neurology consulted for encephalopathy. Brain MRI 12/2/22 with moderate, generalized cerebral and cerebellar atrophy but negative for acute intracranial process. Echo unremarkable with EF 60%.      She scored 12/30 on SLUMs cognitive evaluation indicating severe impairment. She had unwitnessed fall on 12/12/22 when found kneeling on floor next to bed. No apparent injuries noted and denied headstrike.      She had wound on left buttocks which was edematous, painful, drainage, and warm to touch. WOC followed and started on wound care.      She developed significant abdominal pain with loose stools. Started on PPI twice daily and placed on clear liquids. Lipase normal. LFTs improving from prior check. US abdomen with two cystic masses at pancreatic head measuring 3.6 cm and 2.2 cm, minimally changed since 11/27/22 imaging. May present pseudocysts but recommends surveillance in 3-6 months for follow up with MR abdomen. Cdiff negative. CT A/P 12/15/22 with mesenteric haziness in left mid abdomen associated with numerous subcentimeter lymph nodes, new since 11/27/22 imaging. Etiology unclear but possible inflammatory such as mesenteric adenitis or infectious. In setting of normal lipase, pancreatitis less likely. EEG without seizures but showed slowing consistent with encephalopathy. Autoimmune encephalopathy panel ordered. GI consulted. [place] recent CT findings of  mesenteric adenitis related to chronic consequences of pancreatitis. No fever or nighttime sweats to suggest lymphoma or risk factors for TB. Recommend repeating CT 3-6 months to demonstrate stability of findings. Suspect loose stools, related to exocrine pancreatic insufficiency with antibiotics for pneumonia contributing. Infectious stool studies negative. Okay for loperamide as needed. GI signed off. Resumed on oral diet but low fat.     She continued with confusion with intermittent hallucinations but able to verbalize needs and follow directions. Unsteady gait.      She was requiring moderate to dependent assist for most ADLs, dependent assist for transfers (powered sit to stand/dora stedy), and dependent assist for mobility (10 ft with ceiling lift harness and assist of 2 follow with RW). She was independent of ADLs/IADls and mobility prior to hospitalization. She was below functional baseline and admitted to [facility] acute inpatient rehabilitation on 12/20/22.     On the date of discharge, patient was without any complaints. She is looking forward to going home with family. Her mom will come later this afternoon to pick her up. I reviewed her discharge medications, and follow-up appointments. All questions and concerns addressed. Denies pain, fever, chills, headache, dizziness, nausea, vomiting, cough, shortness of breath, chest pain, abdominal pain, dysuria, bowel or bladder incontinence, diarrhea, or constipation. No new motor weakness, paresthesia, or numbness.     MEDICAL ISSUES AND BARRIERS ADDRESSED TO MAXIMIZE FUNCTIONAL GAIN   Rehabilitation Plan  Impaired Self-Cares: Continue OT per Plan of Care  - See function chart below for OT status at time of discharge     Impaired Mobility:  Continue PT per Plan of Care   - See function chart below for PT status at time of discharge     Impaired Cognition/Communication:  Continue ST per Plan of Care. SLUMs score on acute floor 12/30 indicating severe  impairment. Mother requested for emergency guardianship in setting of severe cognitive impairment.   - Update: Mother plans on bringing emergency guardianship paperwork to [facility] 1/11. Then the [facility] will give her a court date a few weeks out for next steps.   - See cognition chart below, continue SLP/OT     Bowel:  Continent bowel movements on stool softeners  - Miralax daily  - Senokot 2 tabs twice daily as needed for constipation     Bladder: Intermittent urinary incontinence on acute floor. Urinary incontinence improved while at [facility] on timed toileting. Dysuria reported 1/3, UA normal. Resolved without intervention. Rare urinary incontinence at time of [facility] discharge.  - Holding home oxybutynin in setting of encephalopathy/confusion, consider resume outpatient pending cognition  - Low PVRs when checked at [facility]  - Timed toileting  - Mostly continent and voiding on own, asymptomatic with occasional urinary incontinence     Regular textures/thin liquids (low fat): Continue ST per Plan of Care. Cleared to continue on regular/thins diet.   - SLP to follow  - Regular textures/thin liquids diet     History of gastric bypass/Nutrition:  Dietician followed while at [facility]. Eating 50% to entire meals.   - Dietician to follow at TCU  - Nutritional supplements as ordered  - MVI daily     Anxiety with depression/Mood/Insomnia: Psychologist consulted. Psychiatry followed on acute floor and while at [facility]. Started on aripiprazole and increased home venlafaxine dose. Weaned off olanzapine as needed.   - Aripiprazole 10 mg at bedtime  - Hydroxyzine 25 mg at bedtime   - Hydroxyzine 25 mg every 6 hours as needed for anxiety  - Melatonin 9 mg at bedtime  - Continue home prazosin 1 mg twice daily  - Venlafaxine  mg daily   - Follow up outpatient with psychiatry Dr. Torres on 3/6/23     Chronic pain syndrome/Polyneuropathy/Pain:  Well controlled. Weaned off methocarbamol while at [facility].   - Acetaminophen 1000 mg at bedtime,  and 650 mg every 6 hours as needed  - Lidocaine patch daily  - Continue home pregabalin 200 mg three times daily    Medical Plan  Non-traumatic brain injury  Wernicke-Korsakoff syndrome (alcohlic)   Hallucinations (not new per mother)  Alcohol abuse/Hepatic steatosis   Psychiatry/neurology followed on acute floor. Started on aripiprazole and venlafaxine increased. Elevated GGT and AST likely due to chronic alcohol abuse. Head CT/brain MRI negative for acute findings. EEG negative for seizures but showed slowing consistent with encephalopathy. Weaned off 1:1 sitter. ALT 12, AST 25, Alkaline phosphatase 170 on 1/10/23, trending down.   - Neuro checks, falls precautions  - Encourage COMPLETE alcohol abstinence  - Folic acid and thiamine   - Quiet environment for optimize brain healing  - Aripiprazole and olanzapine as needed as noted above   - PT, OT, SLP  - Mother plans on bringing emergency guardianship paperwork to [facility] 1/11. Then the [facility] will give her a court date a few weeks out for next steps.   - Follow up outpatient with [facility] Dr. Espinoza on 2/15/23  - Follow up outpatient with neuropsych testing Dr. Rodriguez on 3/20/23    Debility in setting of severe sepsis, lactic acidosis, acute respiratory insufficiency due to acute community-acquired pneumonia  Follows with Dr. Chua at [facility] for CIDP. Last visit 5/9/22. Admitted 11/27 with sepsis due to left sided pneumonia. Blood cultures, influenza, and COVID-19 negative. Completed antibiotic course. Supplemental oxygen weaned to room air. Remained stable on room air while at [facility].     - Stable on room air at [facility] admit  - Incentive spirometry respiratory exercises  - Monitor respiratory function     CIDP post COVID-19   On home hydrocortisone injection every 4 weeks and privigen infusion every 4 weeks for CIDP. Follows with Dr. Chua at [facility]. Last received 11/10/22 with [facility] infusion while she was at [facility]  facility. Tolerated well. Was not due 12/10/22 but was not given while at [facility]. Weight dosed and given IVIG with appropriate premedication at [facility] on 12/21/22.   - Next IVIG dose due 1/18/23 (resume services with [facility] as was doing prior to hospitalization, IVIG orders per Dr. Chua as was doing prior)    Benign essential hypertension  Lymphedema  - Daily weights  - Continue home furosemide 40 mg daily  - Continue home potassium chloride 20 meq twice daily (as on furosemide)     Chronic pancreatic exocrine insufficiency  Low pancreatic elactase  Acute abdominal pain/nausea  Mesenteric lymphadenitis  Diarrhea  US abdomen with hepatostatosis and unchanged pancreatic pseudocyst. CT abdomen with mesenteric haziness, concern for mesenteric adenitis. GI consulted. Infectious or could be related to chronic pancreatitis/pseudocyst. Vasculature patent. Lactate normal. On apixaban. Lymphoma less likely with no symptoms and acute nature. Clostridium difficile toxin and stool multiplex negative. Felt loose stools related to chronic pancreatitis. GI signed off.  Had right upper quadrant pain on 12/30. No reflux noted or change in stools. Lipase normal. ANCA panel for vasculitis and antinuclear antibody by [facility] lab negative on 12/19. Loose stools improved with start on Creon.   - Ondansetron 4 mg every 8 hours as needed for nausea   - Low fat diet  - Monitor stools   - Creon 1 capsule four times daily  - Repeat CT abdomen in 3-6 months with MENA to demonstrate stability of findings  - Follow up outpatient with [facility]    S/p left hip decubitus ulcer debridement and wound vac placement 12/23/22  Community acquired pressure ulcer left back/hip  Present at [facility] and WOC followed there. Suspect from being on floor for few days on air mattress at home. [facility] WOC saw at admit to [facility]. Recommended for surgery debridement due to extensive size and necrotic tissue. General surgery  consulted and recommended I&D. Taken to OR 12/23 and underwent left hip decubitus ulcer debridement and wound vac placement with Dr. Manriquez. Culture growth with multiple organisms. Debridement felt likely achieved good source control and no need for antibiotics at this time.   - Ordered group 2 pressure relief air mattress for home in setting of severe left hip pressure ulcer wound  - Pressure prevention interventions   - Turn every 2 horus  - Wound vac in place (change every MWF), orders per general surgery team, vac last changed 1/11/2023 prior to discharge  - Follow up outpatient in [facility] wound clinic (general surgery will see there)    Anemia  Likely anemia of chronic disease. No overt s/sx bleeding. Iron studies low. Started on oral ferrous sulfate. Hemoglobin in stable in 9s-low 10s on acute floor. Hemoglobin 8.7 on 1/10/23. Recheck Hemoglobin 9.4 on 1/11/2, improved/stable.   - Ferrous sulfate 325 mg every other day  - Monitor Hemoglobin     Tinea vs eczematous dermatitis hand/foot  Petechial rash  Unclear, not blanchable. Platelets normal. Location not noted on acute floor. however upon chart review, follows with dermatology for hand/foot dermatitis. Last seen 10/7/22. Diagnosed tinea versus eczematous dermatitis.   - Monitor rash, topicals as prescribed     Tachycardia, stable  TSH normal. EKG with sinus tachycardia on acute floor. Echocardiogram wnl. Felt possible related to pain. On apixaban. EKG 1/1/23 with sinus tachycardia, no change with heart rate 105. Slowly trended down while at [facility]. Heart rate stable 70s-100s, asymptomatic.   - Already on lifelong anticoagulation apixaban  - Monitor heart rhythm/rate     Chronic pancreatic pseudocysts  Two cystic masses at pancreatic head measuring 3.6 cm and 2.2 cm seen on imaging. May present pseudocysts.   - MR Abdomen in 3-6 months in follow up for surveillance      Moderate T9 compression fracture, new since 2/6/21 imaging  Mild T6  compression fracture, age indeterminate  Incidental findings at admit. Asymptomatic.  - No spinal activity precautions/restrictions or brace per [facility]  - On calcium/vitamin D supplements (takes at home)  - Consider DEXAscan outpatient with PCP     Diabetes mellitus type 2  On metformin 500 mg BID PTA. Hgb A1C 5.7% on 12/2. Held metformin initially due to loose stools on acute floor.   - Check blood glucose twice daily before meals  - Metformin 500 mg daily with meal     History of prior pulmonary embolism with pulseless electrical activity arrest on lifelong anticoagulation   - Continue lifelong home apixaban 5 mg twice daily     GERD  - Continue home omeprazole 20 mg daily    Morbid obesity  History of sleeve gastrectomy  BMI 48. Weight 236 lbs at [facility] admit on 12/20/22. Weight 239 lbs on 1/9/23.  - Encourage healthy weight loss and healthy diet (dietician followed while at [facility])  - Clinically significant due to increased nursnig cares, use of resources, and speciality equipment      Obstructive sleep apnea  Follows with sleep medicine Dr. Melendrez (last evisit on 9/19/22)  - CPAP while sleeping     Tobacco use  Smokes 0.5 ppd PTA. Declines nicotine replacement products while at [facility].   - Encourage cessation  - Continue home nicotine gum as needed for cravings     Home supplement  - Continue calcium  - Continue home cholecalciferol   - Holding home B complex vitamin and biotin supplements per [facility] (defer to PCP for when/if to resume outpatient)    VTE prophylaxis:  SCDs: none (as on chronic lifelong anticoagulation)  Medications: Apixaban (chronic anticoagulation, see above)     REHABILITATION COURSE  Brief of Summary of Rehabilitation Stay: See above for detailed summary. Ms. Bonner participated in therapies and made progress towards rehabilitation goals. She will be discharged to home with PT, OT, SLP, RN, HHA, SW with homecare.     Consultations:  Internal medicine  Rehabpsychology  Diabetes  educator   [facility] RN team  Acute care surgery service      Diagnostic Tests/Treatments reviewed.  Follow up needed:   Other Healthcare Providers Involved in Patient s Care:         Homecare and Specialist appointment - multiple specialists   FOLLOW UP WITH SPECIALIST :Gastroenterology, Neurology, Oncology, Psychiatry and psychology, sleep specialty clinic     Update since discharge: improved.   Plan of care communicated with patient and family     Lab work is in process  Labs reviewed in EPIC  BP Readings from Last 3 Encounters:   01/24/23 134/82   09/19/22 132/84   09/16/22 132/84    Wt Readings from Last 3 Encounters:   09/19/22 118.8 kg (262 lb)   09/16/22 119.3 kg (263 lb)   08/11/22 144.8 kg (319 lb 3.2 oz)                  Patient Active Problem List   Diagnosis     Vitamin D deficiency     Encounter for smoking cessation counseling     Menorrhagia with irregular cycle     Pulmonary embolism with infarction (H)     Secondary hyperparathyroidism, not elsewhere classified (H)     Paresthesias     Hemorrhoids, unspecified hemorrhoid type     Chronic inflammatory demyelinating polyneuropathy (H)     S/P laparoscopic sleeve gastrectomy     Morbid obesity (H)     Moderate major depression (H)     Alcohol abuse     Cognitive and neurobehavioral dysfunction following brain injury (H)     Psychosis, unspecified psychosis type (H)     MELODY (generalized anxiety disorder)     Medical cannabis use     Tobacco use     Compression fracture of T9 vertebra with routine healing, subsequent encounter     Benign essential hypertension     History of pulmonary embolism     KENDALL (obstructive sleep apnea)- mild (AHI 6)     Type 2 diabetes mellitus with hyperglycemia, without long-term current use of insulin (H)     Vitamin B12 deficiency (non anemic)     Chronic anticoagulation     Elevated LFTs     Acute kidney injury (H)     Elevated lactic acid level     Gastrointestinal hemorrhage, unspecified gastrointestinal hemorrhage type      Hypotension due to hypovolemia     Cervical high risk HPV (human papillomavirus) test positive     Urinary incontinence     Past Surgical History:   Procedure Laterality Date      SECTION       COLONOSCOPY N/A 2022    Procedure: COLONOSCOPY;  Surgeon: Chandrakant Toledo MD;  Location:  GI     ESOPHAGOSCOPY, GASTROSCOPY, DUODENOSCOPY (EGD), COMBINED N/A 2022    Procedure: ESOPHAGOGASTRODUODENOSCOPY, WITH BIOPSY;  Surgeon: Chandrakant Toledo MD;  Location:  GI     EXAM UNDER ANESTHESIA ANUS N/A 8/3/2022    Procedure: EXAM UNDER ANESTHESIA, ANUS;  Surgeon: Chip Way MD;  Location: [facility] OR     HEMORRHOIDECTOMY EXTERNAL N/A 8/3/2022    Procedure: HEMORRHOIDECTOMY, EXTERNAL;  Surgeon: Chip Way MD;  Location: [facility] OR     LAPAROSCOPIC GASTRIC SLEEVE N/A 2019    Procedure: Laparoscopic Sleeve Gastrectomy;  Surgeon: Luan Lopez MD;  Location:  OR     ORTHOPEDIC SURGERY      2 knee meniscus surgery       Social History     Tobacco Use     Smoking status: Every Day     Packs/day: 0.25     Years: 1.00     Pack years: 0.25     Types: Cigarettes     Start date: 2016     Smokeless tobacco: Never     Tobacco comments:     10 cigs per day   Substance Use Topics     Alcohol use: Not Currently     Comment: Quit drinking when last hospitalized     Family History   Problem Relation Age of Onset     Pulmonary Embolism Mother      Diabetes Father      Hypertension Father      Breast Cancer Sister 26        surgery only     Cancer Sister      Breast Cancer Sister      Mental Illness Sister         Bipolar     Coronary Artery Disease Other         paternal aunt     Thyroid Disease Other         neice     Coronary Artery Disease Other      Cerebrovascular Disease Other      Thyroid Disease Other      Liver Disease No family hx of      Colon Cancer No family hx of      Skin Cancer No family hx of      Melanoma No family hx of          Current Outpatient Medications   Medication  Sig Dispense Refill     alcohol swab prep pads Use to swab area of injection/luke as directed. 100 each 11     alcohol swab prep pads Use to swab area of injection/luke as directed. 100 each 11     ammonium lactate (AMLACTIN) 12 % external cream APPLY TOPICALLY TO THE AFFECTED AREA TWICE DAILY 385 g 0     apixaban ANTICOAGULANT (ELIQUIS) 5 MG tablet Take 1 tablet (5 mg) by mouth 2 times daily 60 tablet 0     Biotin 5000 MCG TABS Take 1 tablet by mouth daily       blood glucose (NO BRAND SPECIFIED) test strip Use to test blood sugar 1 times daily or as directed. To accompany: Blood Glucose Monitor Brands: per insurance. 100 strip 11     blood glucose (NO BRAND SPECIFIED) test strip Use to test blood sugar 1 times daily or as directed. To accompany: Blood Glucose Monitor Brands: per insurance. 100 strip 11     blood glucose calibration (NO BRAND SPECIFIED) solution To accompany: Blood Glucose Monitor Brands: per insurance. 1 each 0     blood glucose calibration (NO BRAND SPECIFIED) solution To accompany: Blood Glucose Monitor Brands: per insurance. 100 each 11     blood glucose monitoring (NO BRAND SPECIFIED) meter device kit Use to test blood sugar 1 times daily or as directed. Preferred blood glucose meter OR supplies to accompany: Blood Glucose Monitor Brands: per insurance. 1 kit 0     Blood Glucose Monitoring Suppl (ACCU-CHEK GUIDE) w/Device KIT USE TO TEST BLOOD SUGAR DAILY 1 kit 0     calcium Citrate-vitamin D 500-400 MG-UNIT CHEW Take 1 chew tab by mouth 3 times daily 90 tablet 11     diphenhydrAMINE (BENADRYL) 50 MG capsule Take 50 mg by mouth every 6 hours as needed for allergies or other (prior to infusion)       EPINEPHrine (ANY BX GENERIC EQUIV) 0.3 MG/0.3ML injection 2-pack        erythromycin (ROMYCIN) 5 MG/GM ophthalmic ointment Place 0.5 inches into both eyes 2 times daily 3.5 g 1     fluocinonide (LIDEX) 0.05 % external cream Apply topically 2 times daily To hands/feet if rash not resolved with  ketoconazole cream 60 g 0     folic acid (FOLVITE) 1 MG tablet Take 1 tablet (1 mg) by mouth daily 90 tablet 3     hydrOXYzine (ATARAX) 25 MG tablet Take 1-2 tablets (25-50 mg) by mouth every 6 hours as needed for anxiety 180 tablet 1     ketoconazole (NIZORAL) 2 % external cream Apply topically daily To hands/feet 60 g 11     medroxyPROGESTERone (DEPO-PROVERA) 150 MG/ML IM injection Inject 150 mg into the muscle every 3 months       metFORMIN (GLUCOPHAGE XR) 500 MG 24 hr tablet TAKE 1 TABLET(500 MG) BY MOUTH TWICE DAILY WITH MEALS 120 tablet 0     methocarbamol (ROBAXIN) 500 MG tablet Take 1-2 tablets (500-1,000 mg) by mouth 3 times daily as needed for muscle spasms 75 tablet 1     Multiple Vitamins-Minerals (MULTIVITAMIN ADULT) CHEW Take 1 chew tab by mouth 2 times daily 60 tablet 11     OLANZapine (ZYPREXA) 10 MG tablet TAKE 1 TABLET(10 MG) BY MOUTH AT BEDTIME 30 tablet 3     OLANZapine (ZYPREXA) 2.5 MG tablet Take 1 tablet (2.5 mg) by mouth 2 times daily as needed (Anxiety, agitation or psychosis) 60 tablet 1     omeprazole (PRILOSEC) 20 MG DR capsule Take 1 capsule (20 mg) by mouth 2 times daily 180 capsule 3     ondansetron (ZOFRAN-ODT) 4 MG ODT tab DISSOLVE 1 TABLET(4 MG) ON THE TONGUE EVERY 8 HOURS AS NEEDED FOR NAUSEA 30 tablet 1     polyethylene glycol (MIRALAX) 17 GM/SCOOP powder Take 17 g (1 capful) by mouth daily 850 g 3     potassium chloride ER (K-TAB/KLOR-CON) 10 MEQ CR tablet TAKE 2 TABLETS(20 MEQ) BY MOUTH TWICE DAILY 120 tablet 0     prazosin (MINIPRESS) 1 MG capsule TAKE 2 CAPSULES(2 MG) BY MOUTH AT BEDTIME 60 capsule 2     Pregabalin (LYRICA) 200 MG capsule Take 1 capsule (200 mg) by mouth 3 times daily 270 capsule 0     PRIVIGEN 20 GM/200ML SOLN Inject 20 g into the vein every 28 days For total dose of 60 g       PRIVIGEN 40 GM/400ML SOLN Inject 40 g into the vein every 28 days For total dose of 60 g       SOLU-CORTEF 100 MG injection Inject 100 mg into the muscle every 30 days        sulfamethoxazole-trimethoprim (BACTRIM DS) 800-160 MG tablet Take 1 tablet by mouth 2 times daily 6 tablet 0     thin (NO BRAND SPECIFIED) lancets Use with lanceting device. To accompany: Blood Glucose Monitor Brands: per insurance. 90 each 11     thin (NO BRAND SPECIFIED) lancets Use with lanceting device. To accompany: Blood Glucose Monitor Brands: per insurance. 100 each 11     venlafaxine (EFFEXOR XR) 75 MG 24 hr capsule TAKE 3 CAPSULES(225 MG) BY MOUTH DAILY 90 capsule 3     vitamin B-Complex Take 1 tablet by mouth daily 90 tablet 3     vitamin C (ASCORBIC ACID) 1000 MG TABS Take 1 tablet (1,000 mg) by mouth daily 90 tablet 3     vitamin D3 (CHOLECALCIFEROL) 50 mcg (2000 units) tablet Take 1 tablet (50 mcg) by mouth daily 90 tablet 3     No Known Allergies  Recent Labs   Lab Test 09/12/22  0545 08/11/22  0854 08/03/22  0629 08/02/22  0719 07/26/22  1331 07/26/22  1320 07/26/22  1143 02/15/22  1157 11/04/21  1017 10/22/21  0950 10/20/21  1051 07/13/21  1240 06/11/21  1221 03/02/21  0931 02/07/21  0000 02/06/21  0000 01/28/21  0921 12/17/20  0902   A1C  --   --   --   --   --   --  5.4 6.1*  --   --  5.6  --   --  5.0  --   --   --  5.7*   LDL  --   --   --   --   --   --   --  82  --  118*  --   --   --   --   --   --   --  112*   HDL  --   --   --   --   --   --   --  77  --  47*  --   --   --   --   --   --   --  68   TRIG  --   --   --   --   --   --   --  130  --  155*  --   --   --   --   --  263*  --  102   ALT 9*  --   --  102*  --  123* 134* 89*   < > 62*  --   --   --   --    < >  --   --  22   CR 0.70 0.87   < > 1.09*   < > 2.14* 2.18* 0.70  --  0.88 0.82   < > 0.91 0.67   < >  --    < > 0.70   GFRESTIMATED >90 >90   < > 69   < > 31* 30* >90  --  88 >90   < > 85 >90   < >  --    < > >90   GFRESTBLACK  --   --   --   --   --   --   --   --   --   --   --   --  >90 >90   < >  --    < > >90   POTASSIUM 2.8* 3.5   < > 3.7   < > 4.2 4.3 3.5  --  3.8 4.1   < > 3.6 4.1   < >  --    < > 3.7   TSH  --   --   --    --   --   --  3.90 1.06  --   --   --    < >  --   --   --   --   --   --     < > = values in this interval not displayed.        Review of Systems   Constitutional: Positive for fatigue. Negative for chills and fever.   HENT: Negative.    Respiratory: Negative for cough and shortness of breath.    Cardiovascular: Negative for chest pain.   Gastrointestinal: Positive for diarrhea. Negative for abdominal pain.   Musculoskeletal: Positive for arthralgias and back pain.   Skin: Positive for wound.   Allergic/Immunologic: Negative for immunocompromised state.   Neurological: Positive for weakness.   Hematological: Negative for adenopathy.   Psychiatric/Behavioral: Positive for decreased concentration and sleep disturbance. Negative for agitation and self-injury. The patient is not nervous/anxious.         PHQ-9 SCORE 9/19/2022 1/24/2023 1/31/2023   PHQ-9 Total Score MyChart 24 (Severe depression) 8 (Mild depression) -   PHQ-9 Total Score - 8 16   PHQ-A Total Score - - -   Some encounter information is confidential and restricted. Go to Review Flowsheets activity to see all data.       MELODY-7 SCORE 7/25/2022 9/19/2022 1/31/2023   Total Score 18 (severe anxiety) 19 (severe anxiety) -   Total Score 18 - 17   Some encounter information is confidential and restricted. Go to Review Flowsheets activity to see all data.                            [facility] Follow-up to PHQ 7/26/2022 9/19/2022 1/24/2023   PHQ-9 9. Suicide Ideation past 2 weeks Not at all Not at all Several days   Thoughts of suicide or self harm in past 2 weeks - - No   Thoughts of suicide or self harm in past 2 weeks - - No   PHQ-9 Safety concerns? - - No   PHQ-9 Safety concerns? - - No   PHQ-A 9. Suicide Ideation past 2 weeks - - -     Objective    /82   Pulse 114   Temp 98.4  F (36.9  C) (Oral)   Resp 22   SpO2 97%   There is no height or weight on file to calculate B   GENERAL: Patient is well nourished, well developed, obese,in no apparent distress,  non-toxic, in no respiratory distress and acyanotic, alert, cooperative and well hydrated  EYES: Eyes grossly normal to inspection and conjunctivae and sclerae normal  HENT:ear canals and TM's normal and nose and mouth without ulcers or lesions   NECK:normal and supple  CARDIAC:regular rates and rhythm, no murmur, click or rub, no irregular beats and color normal  without LE edema bilaterally  RESP: normal respiratory rate and rhythm, lungs clear to auscultation  unlabored respirations, no intercostal retractions or accessory muscle use  ABD:soft, nontender  SKIN: negatives: color normal, temperature normal, mobility and turgor normal. Wound vac on left hip due to wound/ pressure ulcer   MS: extremities normal- no gross deformities noted and sitting in wheelhcair  NEURO: weakness of muscles throughout , lethargic   PSYCH: alertness: lethargic, affect: blunted, thought content exhibits logical connections, when questioned about suicide, the patient expresses no suicidal ideation, no homicidal ideation,good hygiene  MENTAL STATUS EXAM:  Appearance/Behavior: No apparent distress, Casually groomed and Dressed appropriately for weather  Speech: Normal  Mood/Affect: flat  Insight: Fair and Poor      Results for orders placed or performed in visit on 01/24/23   Hemoglobin A1c     Status: Abnormal   Result Value Ref Range    Hemoglobin A1C 6.0 (H) 0.0 - 5.6 %   Comprehensive metabolic panel     Status: Abnormal   Result Value Ref Range    Sodium 141 133 - 144 mmol/L    Potassium 4.5 3.4 - 5.3 mmol/L    Chloride 107 94 - 109 mmol/L    Carbon Dioxide (CO2) 29 20 - 32 mmol/L    Anion Gap 5 3 - 14 mmol/L    Urea Nitrogen 8 7 - 30 mg/dL    Creatinine 0.57 0.52 - 1.04 mg/dL    Calcium 9.9 8.5 - 10.1 mg/dL    Glucose 135 (H) 70 - 99 mg/dL    Alkaline Phosphatase 200 (H) 40 - 150 U/L    AST      ALT 19 0 - 50 U/L    Protein Total 7.9 6.8 - 8.8 g/dL    Albumin 2.9 (L) 3.4 - 5.0 g/dL    Bilirubin Total 0.3 0.2 - 1.3 mg/dL    GFR  Estimate >90 >60 mL/min/1.73m2   CBC with platelets and differential     Status: Abnormal   Result Value Ref Range    WBC Count 6.6 4.0 - 11.0 10e3/uL    RBC Count 4.27 3.80 - 5.20 10e6/uL    Hemoglobin 12.2 11.7 - 15.7 g/dL    Hematocrit 40.6 35.0 - 47.0 %    MCV 95 78 - 100 fL    MCH 28.6 26.5 - 33.0 pg    MCHC 30.0 (L) 31.5 - 36.5 g/dL    RDW 15.4 (H) 10.0 - 15.0 %    Platelet Count 439 150 - 450 10e3/uL    % Neutrophils 67 %    % Lymphocytes 26 %    % Monocytes 5 %    % Eosinophils 1 %    % Basophils 1 %    % Immature Granulocytes 0 %    Absolute Neutrophils 4.4 1.6 - 8.3 10e3/uL    Absolute Lymphocytes 1.8 0.8 - 5.3 10e3/uL    Absolute Monocytes 0.4 0.0 - 1.3 10e3/uL    Absolute Eosinophils 0.1 0.0 - 0.7 10e3/uL    Absolute Basophils 0.0 0.0 - 0.2 10e3/uL    Absolute Immature Granulocytes 0.0 <=0.4 10e3/uL   CBC with Platelets & Differential     Status: Abnormal    Narrative    The following orders were created for panel order CBC with Platelets & Differential.  Procedure                               Abnormality         Status                     ---------                               -----------         ------                     CBC with platelets and d...[[id number]]  Abnormal            Final result                 Please view results for these tests on the individual orders.     Assessment & Plan     Type 2 diabetes mellitus with hyperglycemia, without long-term current use of insulin (H)  glycohemoglobin monitoring discussed and long term diabetic complications discussed  No changes in current regimen  Weight loss advised  Increased exercise advised   Recheck in 3 months, sooner should new symptoms or   problems arise.  Refill metformin once a day   - metFORMIN (GLUCOPHAGE XR) 500 MG 24 hr tablet  Dispense: 90 tablet; Refill: 1  - Comprehensive metabolic panel  - Hemoglobin A1c  - Hemoglobin A1c  - Comprehensive metabolic panel    Iron deficiency anemia due to chronic blood loss  - apixaban ANTICOAGULANT  (ELIQUIS) 5 MG tablet  Dispense: 60 tablet; Refill: 3  - CBC with Platelets & Differential  - CBC with Platelets & Differential    MELODY (generalized anxiety disorder)  Continue current medications as prescribed.   FOLLOW UP WITH SPECIALIST :Psychiatry  [facility] states will call for follow up appointment   - venlafaxine (EFFEXOR XR) 150 MG 24 hr capsule  Dispense: 90 capsule; Refill: 1  follow up with Psychology. An appointment has been made for you with Dr Warren Torres on Wednesday March 29, 2023 at 3:00 pm.       ((E66.01) Morbid obesity (H)  Healthy diet and exercise reviewed.  Limit pop and juice intake.  Risks of obesity discussed.  Encourage exercise.  Limit TV/Computer/game time to one hour a day.      (I10) Benign essential hypertension  Plan:   HTN Plan:  1)  Medication: continue current medication regimen unchanged  2)  Dietary sodium restriction  3)  Regular aerobic exercise  4)  Recheck in 3 months, sooner should new symptoms or   problems arise.  5) See todays orders.  Patient Education: Reviewed risks of hypertension and principles of   treatment.    (F10.96) Wernicke-Korsakoff syndrome (alcoholic) (H)  Plan:   NEUROLOGY FOLLOW UP  . An appointment has been made for you on June 21, 2023 at 10:00 am with Dr Florentino Chua. If you have any questions or need to reschedule please call [phone].   follow-up with one of the rehabilitation physicians once you discharge from the hospital, approximately 4-6 weeks. An appointment has been scheduled for you on Wednesday February 15, 2023 at 12:45 pm with Dr Crescencio Espinoza.        (Z69.743) Pressure injury of deep tissue of left hip  Plan: [facility] 1-2 weeks following discharge. An appointment has been made for you on Friday January 20, 2023 at 10:00 am.    (G47.33) KENDALL (obstructive sleep apnea)- mild (AHI 6)  Plan:  follow up with your sleep medicine doctor. An appointment has been made for you on Friday April 7, 2023 at 9:45 am with  "Dr Misael Melendrez.    (K86.89) Pancreatic insufficiency  Plan:  follow up with Gastroenterology. An appointment has been made for you on Wednesday February 15, 2023     (F29) Psychosis, unspecified psychosis type (H)  Plan:  follow up with Neuropsychology. An appointment has been made for you on Monday March 20, 2023 at 8:00 am      Review of prior external note(s) from - [facility]where information from [facility] reviewed  Prescription drug management   Time spent doing chart review, history and exam, documentation and further activities per the note     MED REC REQUIRED  Post Medication Reconciliation Status:       BMI:   Estimated body mass index is 41.04 kg/m  as calculated from the following:    Height as of 9/19/22: 1.702 m (5' 7\").    Weight as of 9/19/22: 118.8 kg (262 lb).   Weight management plan: Discussed healthy diet and exercise guidelines          See Patient Instructions  Patient Instructions     PLAN:   1.   Symptomatic therapy suggested: Continue current medications as prescribed.   2.  Orders Placed This Encounter   Medications     apixaban ANTICOAGULANT (ELIQUIS) 5 MG tablet     Sig: Take 1 tablet (5 mg) by mouth 2 times daily     Dispense:  60 tablet     Refill:  3     metFORMIN (GLUCOPHAGE XR) 500 MG 24 hr tablet     Sig: Take 1 tablet (500 mg) by mouth daily (with dinner)     Dispense:  90 tablet     Refill:  1     venlafaxine (EFFEXOR XR) 150 MG 24 hr capsule     Sig: Take 1 capsule (150 mg) by mouth daily     Dispense:  90 capsule     Refill:  1     Orders Placed This Encounter   Procedures     Comprehensive metabolic panel     Hemoglobin A1c     CBC with Platelets & Differential       3. Patient needs to follow up in if no improvement,or sooner if worsening of symptoms or other symptoms develop.  Make follow up appointment with her psychiatrist         [facility] Echevarria [facility]              "

## 2023-01-24 NOTE — Clinical Note
Can we call home health and have them go through her medications with her mother so that they match what we have in the chart?  I adjusted her metfomin and effexor with what she was discharged on  Looks like her Robaxin was discontinued so took that off However mom states there were things she was taking before that are not on her list now  Thank you  Kylah Moseley NP, APRN CNP

## 2023-01-25 ENCOUNTER — OFFICE VISIT (OUTPATIENT)
Dept: NEUROLOGY | Facility: CLINIC | Age: 33
End: 2023-01-25
Payer: MEDICARE

## 2023-01-25 VITALS — HEART RATE: 115 BPM | DIASTOLIC BLOOD PRESSURE: 71 MMHG | SYSTOLIC BLOOD PRESSURE: 108 MMHG | OXYGEN SATURATION: 96 %

## 2023-01-25 DIAGNOSIS — G61.81 CIDP (CHRONIC INFLAMMATORY DEMYELINATING POLYNEUROPATHY) (H): Primary | ICD-10-CM

## 2023-01-25 DIAGNOSIS — G61.81 CHRONIC INFLAMMATORY DEMYELINATING POLYNEUROPATHY (H): ICD-10-CM

## 2023-01-25 PROCEDURE — 99215 OFFICE O/P EST HI 40 MIN: CPT | Performed by: PSYCHIATRY & NEUROLOGY

## 2023-01-25 RX ORDER — FERROUS SULFATE 325(65) MG
325 TABLET ORAL
COMMUNITY
Start: 2023-01-13 | End: 2023-04-28

## 2023-01-25 RX ORDER — POTASSIUM CHLORIDE 750 MG/1
1 TABLET, EXTENDED RELEASE ORAL SEE ADMIN INSTRUCTIONS
COMMUNITY
Start: 2023-01-10 | End: 2023-04-20

## 2023-01-25 RX ORDER — POLYETHYLENE GLYCOL 3350 17 G/17G
17 POWDER, FOR SOLUTION ORAL DAILY
COMMUNITY
Start: 2023-01-12 | End: 2023-11-30

## 2023-01-25 RX ORDER — ACETAMINOPHEN 500 MG
500-1000 TABLET ORAL EVERY 6 HOURS PRN
COMMUNITY
Start: 2023-01-10 | End: 2023-04-20

## 2023-01-25 RX ORDER — FUROSEMIDE 40 MG
1 TABLET ORAL SEE ADMIN INSTRUCTIONS
COMMUNITY
Start: 2023-01-10 | End: 2023-04-20

## 2023-01-25 RX ORDER — ONDANSETRON 4 MG/1
4 TABLET, ORALLY DISINTEGRATING ORAL EVERY 8 HOURS PRN
COMMUNITY
Start: 2023-01-10 | End: 2023-04-18

## 2023-01-25 RX ORDER — HYDROXYZINE PAMOATE 25 MG/1
25 CAPSULE ORAL EVERY 6 HOURS PRN
COMMUNITY
Start: 2023-01-10 | End: 2023-04-20

## 2023-01-25 RX ORDER — LIDOCAINE 4 G/G
1 PATCH TOPICAL SEE ADMIN INSTRUCTIONS
COMMUNITY
Start: 2023-01-11 | End: 2023-04-18

## 2023-01-25 RX ORDER — ARIPIPRAZOLE 10 MG/1
1 TABLET ORAL SEE ADMIN INSTRUCTIONS
COMMUNITY
Start: 2023-01-10 | End: 2023-04-20

## 2023-01-25 RX ORDER — MULTIVITAMIN
1 TABLET ORAL DAILY
COMMUNITY
Start: 2022-08-26 | End: 2023-04-20

## 2023-01-25 RX ORDER — LANOLIN ALCOHOL/MO/W.PET/CERES
1 CREAM (GRAM) TOPICAL SEE ADMIN INSTRUCTIONS
COMMUNITY
Start: 2023-01-10 | End: 2023-04-20

## 2023-01-25 NOTE — RESULT ENCOUNTER NOTE
Aleksey Bonner,    Attached are your test results.  -Normal red blood cell (hgb) levels, normal white blood cell count and normal platelet levels.  -Liver and gallbladder tests (ALT,AST, Alk phos,bilirubin) are significantly elevated. ADVISE: appears to be about the same and will follow up with [facility]   -Kidney function (GFR) is normal.  -Sodium is normal.  -Potassium is normal.  -Calcium is normal.  -Glucose is elevated due to your diabetes.  -A1C (test of diabetes control the last 2-3 months) is at your goal. Please continue with your current plan. Also, you should make an appointment to see me and recheck your A1C test in 6 months.    Please contact us if you have any questions.    Kylah Moseley, CNP

## 2023-01-25 NOTE — LETTER
"1/25/2023       RE: Hilaria Bonner  [address]  [address]  [facility] [address]     Dear Colleague,    Thank you for referring your patient, Hilaria Bonner, to the [facility] [place] at [facility]. Please see a copy of my visit note below.    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  In April 2020 she developed confirmed COVID infection. About 4 weeks later her neurologic symptoms began. Her first symptom was tingling in her hands followed within days burning in her hands.  It then quickly progressed to involving bilateral legs below the knee and then her feet. She felt weak in her hands and feet. Fine motor tasks and gait became impaired. She has trouble picking up objects because she has to watch herself  objects. She needed a walker. She also felt that her speech became periodically slurred. The sensory symptoms that included pain and allodynia were most problematic in her hands and feet. NCS in 7/20 showed absent sensory responses in the upper and lower limbs and normal motor responses. In 8/20 IVIG 2 gm/kg loading and then 1 gm/kg q 3 week maintenance was started. Through the fall 2020 she made slow improvements, particular in function and gait. By 11/20 she felt \"50%\" better. In 1/21 she reduced IVIG from 1 gm/kg q 3 weeks to q 4 week. In 2021 her neuropathy was stable but she developed thoracic compression fractures, and gait became limited by pain. In the spring and summer 2021 she reported worsening sensation and gait. Outcomes 6/2/21 showed a marked drop off in  strength and I-RODS. In 6/21 we increased IVIG to 1 gm/kg q 3 weeks. She stabilized through the end of 2021. In mid 2022 we reduced IVIG back to 1 gm/kg q 4 weeks.     Interval History:   I last saw her 5/9/22. She continues IVIG once per " month 1 gm/kg. She was recently discharged from [facility]. She had a prolonged admission for sepsis and pneumonia. She was also followed by psychiatry for encephalopathy. As far as her neuropathy, she continues to have numbness below her elbows and ankles. She is in a wheelchair now, but when she is at her home she walks with a walker and occasional takes steps unassisted. With her hands she struggles with things like dressing and doing day to day tasks that require fine motor skills. There are no definite IVIG treatment related fluctuations, although sometimes reports an increase in the sensory symptoms as IVIG approaches. This is not consistent.      Prior pertinent laboratory work-up:  5/2020:  ANH 1:160. Vitamin B1 low (45). B12 low/normal (285). Negative/normal double stranded DNA, ANCA, C-reactive, RF, GM1, GD1, Gq1b, vitamin A, B6, Vit E, SSA, SSB undetectable.  6/2020 CSF: 0 WBC, 0 RBC, protein 58 glucose 29.  7/2020: Normal Vitamin B1, B12, folate    8/2020: TS-HDS mildly elevated at 69[address] (N<76194). Negative FGFR3, Immunofixation, paraneoplastic panel,GD1a.  12/20: B1 low (66)  1/2021:  B1 and B6 slightly elevated (B1 = 193, B6 = 182). Normal B12  3/21: Hba1c 5.0  6/21: Normal B6, SSa, SSb, serum IF (no monoclonal protein)    Prior electrophysiologic work-up:  7/23/20 NCS/EMG showed all absent upper and lower limb SNAPS and all normal upper and lower limb motor responses.   8/3/20 NCS/EMG showed absent right  median, ulnar, and sural sensory studies with radial having attenuated amplitude.   1/13/21: NCS still showed a non length-dependent sensory neuropathy or ganglionopathy, but compared to prior studies performed 8/3/20 and 7/23/20 there has been unequivocal interval improvement in sensory response amplitudes in the lower > upper limbs.      Prior pertinent imaging work-up:  5/20: MRI brain with and without contrast was essentially normal  5/20: MRI C spine with and without contrast showed no  abnormal enhancement in the spinal cord, thecal sac or cervical vertebrae. No spinal canal or neural foraminal narrowing.  : MRI T and LS spine showed acute compression fractures of T6, T10 and T9 vertebrae, which are new compared to 2020.     Past Medical History:   Past Medical History:   Diagnosis Date     Acute kidney injury (H) 2019     Acute massive pulmonary embolism (H) 2019     Acute pancreatitis 2018     Acute pancreatitis 2021    due to ETOH     Acute thoracic back pain 2021     ARAMIS (acute kidney injury) (H) 2019     Cardiac arrest, cause unspecified (H) 2019    massive pulmonary embolism with pulseless electrical activity cardiac arrest in May 2019 following gastric bypass in 2019      Depression with anxiety      GERD (gastroesophageal reflux disease)      History of alcohol use disorder      HTN (hypertension)      Infection due to 2019 novel coronavirus 2020    COVID19 infection in 2020     Ischemic colitis (H) 2019     Morbid obesity (H)      Scalp laceration, initial encounter 2020     Past Surgical History:  Past Surgical History:   Procedure Laterality Date      SECTION       COLONOSCOPY N/A 2022    Procedure: COLONOSCOPY;  Surgeon: Chandrakant Toledo MD;  [facility]     ESOPHAGOSCOPY, GASTROSCOPY, DUODENOSCOPY (EGD), COMBINED N/A 2022    Procedure: ESOPHAGOGASTRODUODENOSCOPY, WITH BIOPSY;  Surgeon: Chandrakant Toledo MD;  [facility]     EXAM UNDER ANESTHESIA ANUS N/A 8/3/2022    Procedure: EXAM UNDER ANESTHESIA, ANUS;  Surgeon: Chip Way MD;  [facility]     HEMORRHOIDECTOMY EXTERNAL N/A 8/3/2022    Procedure: HEMORRHOIDECTOMY, EXTERNAL;  Surgeon: Chip Way MD;  [facility]     LAPAROSCOPIC GASTRIC SLEEVE N/A 2019    Procedure: Laparoscopic Sleeve Gastrectomy;  Surgeon: Luan Lopez MD;  [facility]     ORTHOPEDIC SURGERY      2 knee meniscus  surgery     Family history:    There is no known family history of hereditary neuropathies or other neuromuscular disorders.     Social History:    Social History     Tobacco Use     Smoking status: Every Day     Packs/day: 0.25     Years: 1.00     Pack years: 0.25     Types: Cigarettes, Vaping Device     Start date: 11/20/2016     Smokeless tobacco: Never     Tobacco comments:     Vaping daily    Vaping Use     Vaping Use: Never used   Substance Use Topics     Alcohol use: Not Currently     Comment: Quit drinking when last hospitalized     Drug use: Not Currently     Types: Marijuana      Medical Allergies:   No Known Allergies     Current Medications:    Current Outpatient Medications:      acetaminophen (TYLENOL) 500 MG tablet, Take 500-1,000 mg by mouth every 6 hours as needed, Disp: , Rfl:      ammonium lactate (AMLACTIN) 12 % external cream, APPLY TOPICALLY TO THE AFFECTED AREA TWICE DAILY, Disp: 385 g, Rfl: 0     apixaban ANTICOAGULANT (ELIQUIS) 5 MG tablet, Take 1 tablet (5 mg) by mouth 2 times daily, Disp: 60 tablet, Rfl: 3     ARIPiprazole (ABILIFY) 10 MG tablet, Take 1 tablet by mouth See Admin Instructions, Disp: , Rfl:      Biotin 5000 MCG TABS, Take 1 tablet by mouth daily, Disp: , Rfl:      blood glucose (NO BRAND SPECIFIED) lancets standard, Use to test blood sugar 2 times daily or as directed., Disp: 100 lancet, Rfl: 3     blood glucose (NO BRAND SPECIFIED) test strip, Use to test blood sugar 1 times daily or as directed. To accompany: Blood Glucose Monitor Brands: per insurance., Disp: 100 strip, Rfl: 11     blood glucose calibration (NO BRAND SPECIFIED) solution, To accompany: Blood Glucose Monitor Brands: per insurance., Disp: 1 each, Rfl: 0     blood glucose monitoring (NO BRAND SPECIFIED) meter device kit, Use to test blood sugar 1 times daily or as directed. Preferred blood glucose meter OR supplies to accompany: Blood Glucose Monitor Brands: per insurance., Disp: 1 kit, Rfl: 0     calcium  Citrate-vitamin D 500-400 MG-UNIT CHEW, Take 1 chew tab by mouth 3 times daily, Disp: 90 tablet, Rfl: 11     cholecalciferol 50 MCG (2000 UT) tablet, Take 2,000 Units by mouth daily, Disp: , Rfl:      diphenhydrAMINE (BENADRYL) 50 MG capsule, Take 50 mg by mouth every 6 hours as needed for allergies or other (prior to infusion), Disp: , Rfl:      EPINEPHrine (ANY BX GENERIC EQUIV) 0.3 MG/0.3ML injection 2-pack, , Disp: , Rfl:      erythromycin (ROMYCIN) 5 MG/GM ophthalmic ointment, Place 0.5 inches into both eyes 2 times daily, Disp: 3.5 g, Rfl: 1     ferrous sulfate (FEROSUL) 325 (65 Fe) MG tablet, Take 325 mg by mouth every 48 hours, Disp: , Rfl:      fluocinonide (LIDEX) 0.05 % external cream, Apply topically 2 times daily To hands/feet if rash not resolved with ketoconazole cream, Disp: 60 g, Rfl: 0     folic acid (FOLVITE) 1 MG tablet, Take 1 tablet (1 mg) by mouth daily, Disp: 90 tablet, Rfl: 3     furosemide (LASIX) 40 MG tablet, Take 1 tablet by mouth See Admin Instructions, Disp: , Rfl:      hydrOXYzine (ATARAX) 25 MG tablet, Take 1-2 tablets (25-50 mg) by mouth every 6 hours as needed for anxiety, Disp: 180 tablet, Rfl: 1     hydrOXYzine (VISTARIL) 25 MG capsule, Take 25 mg by mouth every 6 hours as needed, Disp: , Rfl:      ketoconazole (NIZORAL) 2 % external cream, Apply topically daily To hands/feet, Disp: 60 g, Rfl: 11     Lidocaine (LIDOCARE) 4 % Patch, Place 1 patch onto the skin See Admin Instructions, Disp: , Rfl:      lipase-protease-amylase (CREON 12) 18670-30[address]-83866 units CPEP, Take 1 capsule by mouth 4 times daily, Disp: , Rfl:      medroxyPROGESTERone (DEPO-PROVERA) 150 MG/ML IM injection, Inject 150 mg into the muscle every 3 months, Disp: , Rfl:      metFORMIN (GLUCOPHAGE XR) 500 MG 24 hr tablet, Take 1 tablet (500 mg) by mouth daily (with dinner), Disp: 90 tablet, Rfl: 1     Multiple Vitamin (ONE-A-DAY ESSENTIAL) TABS, Take 1 tablet by mouth daily, Disp: , Rfl:      Multiple  Vitamins-Minerals (MULTIVITAMIN ADULT) CHEW, Take 1 chew tab by mouth 2 times daily, Disp: 60 tablet, Rfl: 11     OLANZapine (ZYPREXA) 10 MG tablet, TAKE 1 TABLET(10 MG) BY MOUTH AT BEDTIME, Disp: 30 tablet, Rfl: 3     OLANZapine (ZYPREXA) 2.5 MG tablet, Take 1 tablet (2.5 mg) by mouth 2 times daily as needed (Anxiety, agitation or psychosis), Disp: 60 tablet, Rfl: 1     omeprazole (PRILOSEC) 20 MG DR capsule, Take 1 capsule (20 mg) by mouth 2 times daily, Disp: 180 capsule, Rfl: 3     ondansetron (ZOFRAN ODT) 4 MG ODT tab, Place 4 mg under the tongue every 8 hours as needed, Disp: , Rfl:      ondansetron (ZOFRAN-ODT) 4 MG ODT tab, DISSOLVE 1 TABLET(4 MG) ON THE TONGUE EVERY 8 HOURS AS NEEDED FOR NAUSEA, Disp: 30 tablet, Rfl: 1     polyethylene glycol (MIRALAX) 17 GM/Dose powder, Take 17 g by mouth daily, Disp: , Rfl:      polyethylene glycol (MIRALAX) 17 GM/SCOOP powder, Take 17 g (1 capful) by mouth daily, Disp: 850 g, Rfl: 3     potassium chloride ER (K-TAB/KLOR-CON) 10 MEQ CR tablet, TAKE 2 TABLETS(20 MEQ) BY MOUTH TWICE DAILY, Disp: 120 tablet, Rfl: 0     potassium chloride ER (KLOR-CON M) 10 MEQ CR tablet, Take 1 tablet by mouth See Admin Instructions, Disp: , Rfl:      prazosin (MINIPRESS) 1 MG capsule, TAKE 2 CAPSULES(2 MG) BY MOUTH AT BEDTIME, Disp: 60 capsule, Rfl: 2     Pregabalin (LYRICA) 200 MG capsule, Take 200 mg by mouth, Disp: , Rfl:      Pregabalin (LYRICA) 200 MG capsule, Take 1 capsule (200 mg) by mouth 3 times daily, Disp: 270 capsule, Rfl: 0     PRIVIGEN 20 GM/200ML SOLN, Inject 20 g into the vein every 28 days For total dose of 60 g, Disp: , Rfl:      PRIVIGEN 40 GM/400ML SOLN, Inject 40 g into the vein every 28 days For total dose of 60 g, Disp: , Rfl:      senna-docusate (SENOKOT-S/PERICOLACE) 8.6-50 MG tablet, Take 1 tablet by mouth, Disp: , Rfl:      SOLU-CORTEF 100 MG injection, Inject 100 mg into the muscle every 30 days, Disp: , Rfl:      sulfamethoxazole-trimethoprim (BACTRIM DS)  800-160 MG tablet, Take 1 tablet by mouth 2 times daily, Disp: 6 tablet, Rfl: 0     thiamine (B-1) 100 MG tablet, Take 1 tablet by mouth See Admin Instructions, Disp: , Rfl:      thin (NO BRAND SPECIFIED) lancets, Use with lanceting device. To accompany: Blood Glucose Monitor Brands: per insurance., Disp: 90 each, Rfl: 11     thin (NO BRAND SPECIFIED) lancets, Use with lanceting device. To accompany: Blood Glucose Monitor Brands: per insurance., Disp: 100 each, Rfl: 11     venlafaxine (EFFEXOR XR) 150 MG 24 hr capsule, Take 1 capsule (150 mg) by mouth daily, Disp: 90 capsule, Rfl: 1     vitamin B-Complex, Take 1 tablet by mouth daily, Disp: 90 tablet, Rfl: 3     vitamin C (ASCORBIC ACID) 1000 MG TABS, Take 1 tablet (1,000 mg) by mouth daily, Disp: 90 tablet, Rfl: 3     vitamin D3 (CHOLECALCIFEROL) 50 mcg (2000 units) tablet, Take 1 tablet (50 mcg) by mouth daily, Disp: 90 tablet, Rfl: 3    Current Facility-Administered Medications:      cyanocobalamin injection 1,000 mcg, 1,000 mcg, Intramuscular, Q30 Days, Crissy Madrigal PA-C, 1,000 mcg at 03/16/22 1017     cyanocobalamin injection 1,000 mcg, 1,000 mcg, Intramuscular, Q30 Days, Crissy Madrigal PA-C, 1,000 mcg at 07/26/22 1112    Review of Systems: A complete review of systems was obtained and was negative except for what was noted above.     Physical examination:    /71   Pulse 115   SpO2 96%     General Appearance: NAD    Skin: There are no rashes or other skin lesions.    Neurologic examination:    Mental status:  Patient is alert, attentive, and oriented x 3.  Language is coherent and fluent without aphasia.  Memory, comprehension and ability to follow commands were intact.       Cranial nerves:   Pupils were round and reacted to light.  Extraocular movements were full. There was no face, jaw, palate or tongue weakness or atrophy. Hearing was grossly intact.       Motor exam: No atrophy or fasciculations.   Manual muscle testing revealed the  following MRC grade muscle power:   Right Left   Neck flexion 5    Neck extension: 5    Shoulder abduction:  5 5   Elbow extension: 5 5   Elbow flexion:  5 5   Wrist flexion:  5 5   Wrist extension:  5 5   APB 5 5   FDI 4 4   Hip flexion 5 5   Knee flexion 5 5   Knee extension 5 5   Dorsiflexion 5 5   Plantar flexion 5 5   Red indicates worse when compared to last examination  Green indicates improved when compared to last examination    Complex motor skills: Mild bilateral postural hand tremor. Moderate ataxia with FNF.    Sensory exam: Vibration reduced in toes > ankles and fingers. Pin reduced below the knee and below the elbows    Gait: Unable to walk today    Deep tendon reflexes:   Right Left   Triceps 2 2   Biceps 1 2   Brachioradialis 2 2   Knee jerk 0 0   Ankle jerk 0 0     Neuropathy Assessments  Neurology Assessments 2023 2022 2021 2021 3/31/2021 2021 2020   RODS CIDP/MGUSP Score 28 31 34 22 33 32 29   Time for 'Up and Go' test- Seconds: Not able to walk 9.1 8.9 - 25.7 9.79 10.2      Strength: 2023 2022 2021 2021 3/31/2021 2021 2020   Right hand strength in K 23 25 16 24 25 18   Left hand strength in K 25 30 14 28 28 17     Immunotherapy 2022 2021 2021 3/31/2021 2021 2020 2020   Time since last IVIG (days): 2.5 weeks 1 day 1 week 27 days 1 week 12 5 days   Current treatment: IVIG 1 gm/kg q 3 weeks IVIG 1 gm/kg q 3 weeks IVIG 1 gm/kg q 4 weeks IVIG 1gm/kg q 4 weeks IVIG 1 gm/kg q 3 weeks IVIG 1gm/kg [id number] IVIG 1 gm/kg q 3 weeks     Assessment:    Hilaria Bonner is a 32 year old woman with an acute onset (2020) 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  She also had well documented nutritional deficiencies (B1) at the time of neuropathy onset which may have contributed as well. Her clinical course has also  been clouded by back pain/compression fractures and psychiatric overlay. From a neuropathy perspective it is very challenging to know her neuropathy disease activity status. Her  strength and gait are worse today, but there may be other reasons for that other than neuropathy. Certainly her neuropathy has caused some nerve injuries that are likely irreversible at this point. Our objective over the next few months is to better understand if there is an active immunologic component. If there is no evidence for active immunologic disease then will taper IVIG and focus on supportive care.      Plan:      1. Nerve conduction studies: Although typically not a good way to follow neuropathy, in her case repeat studies to look for interval changes may provide important data.   2. Labs: Serum NfL. Will repeat B1 as well.   3. Immunotherapy: Continue IVIG 1 gm/kg q 4 weeks for now. If NCS are worse compared to the 1/13/21 study and NfL elevated then will take a cautious approach to IVIG tapering - and may need to escalate therapy. If those studies are improved (NCS) or normal (NfL) then will reduce IVIG at next visit and monitor for clinical change.   4. Pain: Continue follow up in the multidisciplinary pain clinic.   5. Nutritional: Continue B1 and B12 supplementation.   6. Gait and conditioning: She attends PT twice weekly  7. Follow up in 2-3 months. Sooner if needed. Pending NCS and NFL may either increase or decrease IVIG. IVIG-sparing medications are an option if we determine that she is IVIG dependant, but not desirable given recent sepsis and pneuromna. SCIG may be a better option.  ---        Sincerely,    Travon Chua MD

## 2023-01-26 ENCOUNTER — LAB (OUTPATIENT)
Dept: LAB | Facility: CLINIC | Age: 33
End: 2023-01-26
Payer: MEDICARE

## 2023-01-26 ENCOUNTER — MEDICAL CORRESPONDENCE (OUTPATIENT)
Dept: HEALTH INFORMATION MANAGEMENT | Facility: CLINIC | Age: 33
End: 2023-01-26

## 2023-01-26 DIAGNOSIS — G61.81 CIDP (CHRONIC INFLAMMATORY DEMYELINATING POLYNEUROPATHY) (H): ICD-10-CM

## 2023-01-26 PROCEDURE — 36415 COLL VENOUS BLD VENIPUNCTURE: CPT

## 2023-01-26 PROCEDURE — 84425 ASSAY OF VITAMIN B-1: CPT | Mod: 90

## 2023-01-26 PROCEDURE — 83520 IMMUNOASSAY QUANT NOS NONAB: CPT

## 2023-01-26 PROCEDURE — 99000 SPECIMEN HANDLING OFFICE-LAB: CPT

## 2023-01-27 ENCOUNTER — DOCUMENTATION ONLY (OUTPATIENT)
Dept: OTHER | Age: 33
End: 2023-01-27

## 2023-01-27 LAB
Lab: NORMAL
PERFORMING LABORATORY: NORMAL
SPECIMEN STATUS: NORMAL
TEST NAME: NORMAL

## 2023-01-30 ENCOUNTER — TELEPHONE (OUTPATIENT)
Dept: FAMILY MEDICINE | Facility: CLINIC | Age: 33
End: 2023-01-30
Payer: MEDICARE

## 2023-01-30 ENCOUNTER — NURSE TRIAGE (OUTPATIENT)
Dept: NURSING | Facility: CLINIC | Age: 33
End: 2023-01-30
Payer: MEDICARE

## 2023-01-30 LAB — LABCORP INTERFACED MISCELLANEOUS TEST RESULT: NORMAL

## 2023-01-30 NOTE — TELEPHONE ENCOUNTER
[facility] calling for PT order. Orders okayed per MD note.     Carmen Nowak, RN on 1/30/2023 at 5:29 PM      Reason for Disposition    General information question, no triage required and triager able to answer question    Protocols used: INFORMATION ONLY CALL - NO TRIAGE-A-

## 2023-01-30 NOTE — TELEPHONE ENCOUNTER
Kena, physical therapist with [facility], calling to request a modification of orders. Kena is requesting 2 visits per week for the next 4 weeks.     Routing to provider to review and advise.    Fatoumata Mendoza RN    [facility]

## 2023-01-30 NOTE — TELEPHONE ENCOUNTER
Forms/Letter Request    Type of form/letter: [facility] forms need to be signed placed on providers desk for response.     Have you been seen for this request: N/A    Do we have the form/letter: Yes:     When is form/letter needed by: asap    How would you like the form/letter returned: Fax number- [phone]

## 2023-01-30 NOTE — TELEPHONE ENCOUNTER
RN called Kena and AC to call clinic at [phone]. No name associated with voicemail message so unable to leave detailed message. If Kena calls back please relay provider approval for requested home care orders below.     Fatoumata Mendoza RN    [facility]

## 2023-01-30 NOTE — TELEPHONE ENCOUNTER
RN called Stella and left detailed voicemail message relaying provider approval for requested home care orders below. RN provided clinic number [phone] and fax number [phone].    Fatoumata Mendoza RN    [facility]

## 2023-01-30 NOTE — TELEPHONE ENCOUNTER
Reason for Call: Request for an order or referral:    Order or referral being requested: Verbal Order     Date needed: as soon as possible    Has the patient been seen by the PCP for this problem? Not Applicable    Additional comments: Stella from [facility] calling requesting verbal orders for change in frequency for current OT in home care for 2x a week for 3 weeks. This order is to address ADL safety, Functional strengthening, Exersise program cognition. Martínez number to reach her back at is [phone] Stella stated a Vm can be left as well.      Phone number Patient can be reached at:  Cell number on file:    Telephone Information:   Mobile [phone]       Best Time:      Can we leave a detailed message on this number?  YES    Call taken on 1/30/2023 at 12:36 PM by Lola Daniel

## 2023-01-31 ENCOUNTER — TELEPHONE (OUTPATIENT)
Dept: FAMILY MEDICINE | Facility: CLINIC | Age: 33
End: 2023-01-31
Payer: MEDICARE

## 2023-01-31 ENCOUNTER — VIRTUAL VISIT (OUTPATIENT)
Dept: PSYCHOLOGY | Facility: CLINIC | Age: 33
End: 2023-01-31
Payer: MEDICARE

## 2023-01-31 ENCOUNTER — MEDICAL CORRESPONDENCE (OUTPATIENT)
Dept: HEALTH INFORMATION MANAGEMENT | Facility: CLINIC | Age: 33
End: 2023-01-31

## 2023-01-31 DIAGNOSIS — F33.1 MDD (MAJOR DEPRESSIVE DISORDER), RECURRENT EPISODE, MODERATE (H): Primary | ICD-10-CM

## 2023-01-31 PROBLEM — L89.229 DECUBITUS ULCER OF LEFT HIP: Status: ACTIVE | Noted: 2022-12-30

## 2023-01-31 PROBLEM — F10.96 WERNICKE-KORSAKOFF SYNDROME (ALCOHOLIC) (H): Status: ACTIVE | Noted: 2021-02-11

## 2023-01-31 PROBLEM — K86.89 PANCREATIC INSUFFICIENCY: Status: ACTIVE | Noted: 2022-12-26

## 2023-01-31 PROCEDURE — 90837 PSYTX W PT 60 MINUTES: CPT | Mod: 95 | Performed by: SOCIAL WORKER

## 2023-01-31 ASSESSMENT — COLUMBIA-SUICIDE SEVERITY RATING SCALE - C-SSRS
2. HAVE YOU ACTUALLY HAD ANY THOUGHTS OF KILLING YOURSELF?: NO
SUICIDE, SINCE LAST CONTACT: NO
TOTAL  NUMBER OF ABORTED OR SELF INTERRUPTED ATTEMPTS SINCE LAST CONTACT: NO
TOTAL  NUMBER OF INTERRUPTED ATTEMPTS SINCE LAST CONTACT: NO
1. SINCE LAST CONTACT, HAVE YOU WISHED YOU WERE DEAD OR WISHED YOU COULD GO TO SLEEP AND NOT WAKE UP?: NO
ATTEMPT SINCE LAST CONTACT: NO

## 2023-01-31 ASSESSMENT — ANXIETY QUESTIONNAIRES
GAD7 TOTAL SCORE: 17
6. BECOMING EASILY ANNOYED OR IRRITABLE: MORE THAN HALF THE DAYS
1. FEELING NERVOUS, ANXIOUS, OR ON EDGE: NEARLY EVERY DAY
2. NOT BEING ABLE TO STOP OR CONTROL WORRYING: NEARLY EVERY DAY
7. FEELING AFRAID AS IF SOMETHING AWFUL MIGHT HAPPEN: NEARLY EVERY DAY
3. WORRYING TOO MUCH ABOUT DIFFERENT THINGS: NEARLY EVERY DAY
5. BEING SO RESTLESS THAT IT IS HARD TO SIT STILL: SEVERAL DAYS
GAD7 TOTAL SCORE: 17

## 2023-01-31 ASSESSMENT — ENCOUNTER SYMPTOMS
AGITATION: 0
WEAKNESS: 1
ADENOPATHY: 0
ABDOMINAL PAIN: 0

## 2023-01-31 ASSESSMENT — PATIENT HEALTH QUESTIONNAIRE - PHQ9
SUM OF ALL RESPONSES TO PHQ QUESTIONS 1-9: 16
5. POOR APPETITE OR OVEREATING: MORE THAN HALF THE DAYS

## 2023-01-31 NOTE — TELEPHONE ENCOUNTER
Forms/Letter Request    Type of form/letter: [facility] forms signed will fax.     Have you been seen for this request: N/A    Do we have the form/letter: Yes:     When is form/letter needed by: asap    How would you like the form/letter returned: Fax number- [phone]

## 2023-01-31 NOTE — TELEPHONE ENCOUNTER
Forms/Letter Request    Type of form/letter: [facility]    Have you been seen for this request: N/A    Do we have the form/letter: Yes: placed in Crissy Madrigal forms in basket for review    When is form/letter needed by: ASAP    How would you like the form/letter returned: Fax  [phone]

## 2023-01-31 NOTE — TELEPHONE ENCOUNTER
Forms/Letter Request    Type of form/letter: [facility]    Have you been seen for this request: N/A    Do we have the form/letter: Yes: placed in Crissy Madrigal forms in basket for review    When is form/letter needed by: ASAP    How would you like the form/letter returned: Fax  [phone]

## 2023-01-31 NOTE — PROGRESS NOTES
[facility]                                     Progress Note    Patient Name: Hilaria Bonner  Date: 2023         Service Type: Individual      Session Start Time:15:06 Session End Time: 16:00     Session Length: 54    Session #:4    Attendees: Client    Service Modality:  Video Visit:      Provider verified identity through the following two step process.  Patient provided:  Patient photo, Patient  and Patient address    Telemedicine Visit: The patient's condition can be safely assessed and treated via synchronous audio and visual telemedicine encounter.      Reason for Telemedicine Visit: Services only offered telehealth    Originating Site (Patient Location): Patient's home    Distant Site (Provider Location): Provider Remote Setting    Consent:  The patient/guardian has verbally consented to: the potential risks and benefits of telemedicine (video visit) versus in person care; bill my insurance or make self-payment for services provided; and responsibility for payment of non-covered services.     Patient would like the video invitation sent by:  Text to cell phone: [phone]    Mode of Communication:  Video Conference via [employer] was used after mychart failed.     Distant Location (Provider):  Off-site    As the provider I attest to compliance with applicable laws and regulations related to telemedicine.    DATA  Interactive Complexity: No  Crisis: No      Progress Since Last Session (Related to Symptoms / Goals / Homework):   Symptoms: Has been sick. was last seen in October    Homework: Did not complete      Episode of Care Goals: Minimal progress - ACTION (Actively working towards change); Intervened by reinforcing change plan / affirming steps taken- was last seen in 2022.     Current / Ongoing Stressors and Concerns: Patient returned after several months with trips back and forth from home to hospital. Patient reports several memory issues. She is now living with  her parents. Has been seeing OP, PT, Speech Therapist, Neurologist, and psychiatrist. Family has followed through the referral for CM via [facility]. She has an appt on 2/08 with an . She has been experiencing self defeating thoughts, unnecessary guilty feelings and self blaming for being sick.This was processed today. Her TP was reviewed today. She is planing to increase positivity and focus on the here and now. Since she has not worked on them enough, she will keep them as they have been initially.     Treatment Objective(s) Addressed in This Session:   use thought-stopping strategy daily to reduce intrusive thoughts  Identify negative self-talk and behaviors: challenge core beliefs, myths, and actions  increase understanding of steps in the grief process  PTSD: process past traumatic events     Intervention:     Situation        Automatic Thoughts  Cognitive Distortions      Feelings        Behavior        Questioning Thoughts          Motivational Interviewing    MI Intervention: Expressed Empathy/Understanding, Supported Autonomy, Collaboration, Evocation, Permission to raise concern or advise and Open-ended questions     Change Talk Expressed by the Patient: Taking steps    Provider Response to Change Talk: E - Evoked more info from patient about behavior change, A - Affirmed patient's thoughts, decisions, or attempts at behavior change, R - Reflected patient's change talk and S - Summarized patient's change talk statements    Assessments completed prior to visit:7/25/2022    The following assessments were completed by patient for this visit:  PHQ2:   PHQ-2 ( 1999 [employer]) 7/26/2022 5/13/2022 5/13/2022 2/15/2022 2/15/2022 11/16/2021 8/13/2021   Q1: Little interest or pleasure in doing things 3 - - - - 1 3   Q2: Feeling down, depressed or hopeless 3 - - - - 1 3   PHQ-2 Score 6 - - - - 2 6   PHQ-2 Total Score (12-17 Years)- Positive if 3 or more points; Administer PHQ-A if positive - - - - - - 6    Q1: Little interest or pleasure in doing things Nearly every day - - - - Several days Nearly every day   Q2: Feeling down, depressed or hopeless Nearly every day - - - - Several days Nearly every day   PHQ-2 Score 6 Incomplete Incomplete Incomplete Incomplete 2 6     PHQ9:   PHQ-9 SCORE 2/15/2022 5/13/2022 7/21/2022 7/26/2022 9/19/2022 1/24/2023 1/31/2023   PHQ-9 Total Score MyChart 7 (Mild depression) 7 (Mild depression) - 22 (Severe depression) 24 (Severe depression) 8 (Mild depression) -   PHQ-9 Total Score 7 7 22 22 24 8 16   PHQ-A Total Score - - - - - - -     GAD2:   MELODY-2 7/25/2022 7/25/2022   Feeling nervous, anxious, or on edge 3 3   Not being able to stop or control worrying 3 3   MELODY-2 Total Score 6 6     GAD7:   MELODY-7 SCORE 2/15/2022 5/13/2022 7/21/2022 7/25/2022 7/25/2022 9/19/2022 1/31/2023   Total Score 9 (mild anxiety) 6 (mild anxiety) - - 18 (severe anxiety) 19 (severe anxiety) -   Total Score 9 6 18 18 18 19 17     CAGE-AID:   CAGE-AID Total Score 6/1/2018 7/21/2022 1/31/2023   Total Score 0 1 0   Total Score MyChart 0 (A total score of 2 or greater is considered clinically significant) - -     PROMIS 10-Global Health (all questions and answers displayed):   PROMIS 10 9/19/2022 1/31/2023   In general, would you say your health is: Fair -   In general, would you say your quality of life is: Poor -   In general, how would you rate your physical health? Poor -   In general, how would you rate your mental health, including your mood and your ability to think? Poor -   In general, how would you rate your satisfaction with your social activities and relationships? Poor -   In general, please rate how well you carry out your usual social activities and roles Poor -   To what extent are you able to carry out your everyday physical activities such as walking, climbing stairs, carrying groceries, or moving a chair? A little -   How often have you been bothered by emotional problems such as feeling anxious,  depressed or irritable? Always -   How would you rate your fatigue on average? Very severe -   How would you rate your pain on average?   0 = No Pain  to  10 = Worst Imaginable Pain 8 -   In general, would you say your health is: 2 2   In general, would you say your quality of life is: 1 2   In general, how would you rate your physical health? 1 2   In general, how would you rate your mental health, including your mood and your ability to think? 1 3   In general, how would you rate your satisfaction with your social activities and relationships? 1 2   In general, please rate how well you carry out your usual social activities and roles. (This includes activities at home, at work and in your community, and responsibilities as a parent, child, spouse, employee, friend, etc.) 1 2   To what extent are you able to carry out your everyday physical activities such as walking, climbing stairs, carrying groceries, or moving a chair? 2 2   In the past 7 days, how often have you been bothered by emotional problems such as feeling anxious, depressed, or irritable? 5 4   In the past 7 days, how would you rate your fatigue on average? 5 4   In the past 7 days, how would you rate your pain on average, where 0 means no pain, and 10 means worst imaginable pain? 8 6   Global Mental Health Score 4 9   Global Physical Health Score 6 9   PROMIS TOTAL - SUBSCORES 10 18   Some recent data might be hidden     Long Suicide Severity Rating Scale (Lifetime/Recent)  Long Suicide Severity Rating (Lifetime/Recent) 3/26/2019   Q2 Suicidal Thoughts (Past Month) no   Q6 Suicide Behavior (Lifetime) no     Long Suicide Severity Rating Scale (Short Version)  Long Suicide Severity Rating (Short Version) 6/5/2020 10/22/2020 3/22/2021 7/21/2022 7/26/2022 7/28/2022 1/31/2023   Over the past 2 weeks have you felt down, depressed, or hopeless? yes yes yes - no no -   Over the past 2 weeks have you had thoughts of killing yourself? no no no -  no no -   Have you ever attempted to kill yourself? no no no - no no -   Q2 Suicidal Thoughts (Past Month) - - - - - - -   Q6 Suicide Behavior (Lifetime) - - - - - - -   1. Wish to be Dead (Since Last Contact) - - - 0 - - 0   2. Non-Specific Active Suicidal Thoughts (Since Last Contact) - - - 0 - - 0   Actual Attempt (Since Last Contact) - - - 0 - - 0   Has subject engaged in non-suicidal self-injurious behavior? (Since Last Contact) - - - - - - 0   Interrupted Attempts (Since Last Contact) - - - 0 - - 0   Aborted or Self-Interrupted Attempt (Since Last Contact) - - - 0 - - 0   Preparatory Acts or Behavior (Since Last Contact) - - - 0 - - -   Suicide (Since Last Contact) - - - 0 - - 0   Calculated C-SSRS Risk Score (Since Last Contact) - - - No Risk Indicated - - No Risk Indicated       ASSESSMENT: Current Emotional / Mental Status (status of significant symptoms):   Risk status (Self / Other harm or suicidal ideation)   Patient denies current fears or concerns for personal safety.   Patient denies current or recent suicidal ideation or behaviors.   Patient denies current or recent homicidal ideation or behaviors.   Patient denies current or recent self injurious behavior or ideation.   Patient denies other safety concerns.   Patient reports there has been no change in risk factors since their last session.     Patient reports there has been no change in protective factors since their last session.     Recommended that patient call 911 or go to the local ED should there be a change in any of these risk factors.     Appearance:   Appropriate    Eye Contact:   Good    Psychomotor Behavior: Normal    Attitude:   Cooperative    Orientation:   Person Place Time Situation   Speech    Rate / Production: Normal/ Responsive Normal     Volume:  Normal    Mood:    Anxious  Depressed    Affect:    Appropriate    Thought Content:  Clear    Thought Form:  Coherent  Logical    Insight:    Good      Medication Review:   No changes  to current psychiatric medication(s)     Medication Compliance:   Yes     Changes in Health Issues:   None reported     Chemical Use Review:   Substance Use: Chemical use reviewed, no active concerns identified      Tobacco Use: No change in amount of tobacco use since last session.  Contemplation    Diagnosis:  1. MDD (major depressive disorder), recurrent episode, moderate (H)      Collateral Reports Completed:   Routed note to PCP    PLAN: (Patient Tasks / Therapist Tasks / Other)  Patient will talk to her family when feeling sad.   Patient will stay in touch with her providers  Patient will increase positive self talk when feeling sad.   Patient will practice meditation to bring her thoughts to here.  Patient will reflect on today's discussion around her grief   Patient will try some mindfully exercise in my chart.   Patient will keep her goal to do accomplish one thing a day.   Patient's next visit is in 2 weeks..    Dai Zayas, LICSW  ___________________________________________________________________  Individual Treatment Plan    Patient's Name: Hilaria Bonner  YOB: 1990    Date of Creation: 9/26/2022    Date Treatment Plan Last Reviewed/Revised: 9/26/2022    DSM5 Diagnoses: 296.32 (F33.1) Major Depressive Disorder, Recurrent Episode, Moderate _ and With anxious distress, 300.02 (F41.1) Generalized Anxiety Disorder or Adjustment Disorders  309.28 (F43.23) With mixed anxiety and depressed mood ;Grief reaction [F43.21]    Psychosocial / Contextual Factors:ongoing depression, anxiety, recent loss of long term partner , the father of her 2 children, strong history of child trauma, ongoing medical issues, history of trauma     PROMIS (reviewed every 90 days): 10    Referral / Collaboration:  Referral to another professional/service is not indicated at this time.     Anticipated number of session for this episode of care: 9-12 sessions  Anticipation frequency of session:  "Biweekly  Anticipated Duration of each session: 53 or more minutes  Treatment plan will be reviewed in 90 days or when goals have been changed.       MeasurableTreatment Goal(s) related to diagnosis / functional impairment(s)  Goal 1: Patient will develop health cognitive patterns and beliefs about self and the world that lead to alleviation and help prevent the relapse of depression symptoms.     I will know I've met my goal when the level of my depression is reduced from 4/4 to 3/4 or better\"- no  Change as of 1/31/2023     Objective #A (Patient Action)                          Patient will Increase interest, engagement, and pleasure in doing things  Decrease frequency and intensity of feeling down, depressed, hopeless  Identify negative self-talk and behaviors: challenge core beliefs, myths, and actions  Improve concentration, focus, and mindfulness in daily activities .  Status: Continued - Date(s): 1/31/2023  Intervention(s)  Therapist will provide provide space to express feelings and thoughts.     Objective #B  Patient will Improve quantity and quality of night time sleep / decrease daytime naps  Feel less tired and more energy during the day   Improve concentration, focus, and mindfulness in daily activities .  Status: Continued - Date(s): 1/31/2023     Intervention(s)  Therapist will assign homework : review and practice CBT skills.     Objective #C  Patient will identify at least 4 fears / thoughts that contribute to feeling anxious.  Status: Continued - Date(s): 1/31/2023  Intervention(s)  Therapist will teach distraction skills.  : self soothe using the 4 senses.     Goal 2: Patient will stabilize anxiety level while increasing ability to function on a daily basis     I will know I've met my goal when when the level of my anxiety is reduced from 4/4 to 3/4 or better by next review.       Objective #A (Patient Action)                          Status: Continued - Date(s): 1/31/2023  Patient will use " "relaxation strategies 4 times per day to reduce the physical symptoms of anxiety.   Intervention(s)  Therapist will teach emotional recognition/identification. : express feelings as they present for support.     Objective #B  Patient will use thought-stopping strategy daily to reduce intrusive thoughts.                        Status: Continued - Date(s): 1/31/2023  Intervention(s)  Therapist will practice CBT/DBT/ACT skills. .     Objective #C  Patient will make a list of pros and cons for tolerating and not tolerating an urge to harm self.  Status: Continued - Date(s): 1/31/2023     Intervention(s)  Therapist will  assess for safety and reinforce safety plan    Goal 3: Patient will display an understanding of the grief process and how this process may be exacerbated by or may exacerbate mental illness symptoms.    I will know I've met my goal when I am able to express unresolved emotions regarding my loss by the next review\"     Objective #A (Patient Action)                          Patient will identify how the use of substances contributes to the avoidance of feeling associated with the loss.  Status: Continued - Date(s):1/31/2023  Intervention(s)  Therapist will provide space and time to process grief.     Goal 3: Patient will develop an understanding of how avoidance of the grief process may affect functioning in all areas of functioning.   I will know I've met my goal when I have developed alternative diversions and other coping mechanisms that are related to loss issues by the next review       Objective #A (Patient Action)                          Status: Continued - Date(s): 1/31/2023  Patient will talk to at least two others about losses and coping.  Intervention(s)  Therapist will provide some education on how to safely share feelings of loss with others.    Patient has reviewed and agreed to the above plan  .    JESSICA Reagan  January 31/2023  "

## 2023-01-31 NOTE — TELEPHONE ENCOUNTER
RN called Kena (PT) to relay provider approval of home care order below. Kena verbalized good understanding. No further questions or concerns.    Dorothy Nash RN  [facility]

## 2023-02-01 ENCOUNTER — MEDICAL CORRESPONDENCE (OUTPATIENT)
Dept: HEALTH INFORMATION MANAGEMENT | Facility: CLINIC | Age: 33
End: 2023-02-01

## 2023-02-01 ENCOUNTER — OFFICE VISIT (OUTPATIENT)
Dept: NEUROLOGY | Facility: CLINIC | Age: 33
End: 2023-02-01
Payer: MEDICARE

## 2023-02-01 DIAGNOSIS — G61.81 CIDP (CHRONIC INFLAMMATORY DEMYELINATING POLYNEUROPATHY) (H): ICD-10-CM

## 2023-02-01 PROCEDURE — 95911 NRV CNDJ TEST 9-10 STUDIES: CPT | Performed by: PSYCHIATRY & NEUROLOGY

## 2023-02-01 NOTE — PROGRESS NOTES
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  She also had well documented nutritional deficiencies (B1). This study is performed to assess for interval changes in her neuropathy compared to a study dated 21.     Techniques: Sensory and motor conduction studies were done with surface recording electrodes.    Results:    Nerve conduction studies:   1. Right median-D2 sensory response shows severely slowed CV and moderately reduced amplitude.   2. Right ulnar-D5 and radial sensory responses show normal CV and moderately reduced amplitudes.  3. Right sural and SP sensory responses are normal.   4. Right ulnar-ADM motor response show normal DL, normal amplitude and mild CV slowing across the elbow.  5. Right median-APB, peroneal-EDB, and tibial-AH motor responses are normal.      Interpretation:  This is an abnormal study. As was seen on the 21 study there is electrophysiological evidence of a non length-dependent sensory predominant  neuropathy or ganglionopathy. Today's study shows a modest degree of worsening of most lower limb motor and sensory responses, although all would still be considered with normal limits. In the upper limbs incidental note is again made of a right sided ulnar neuropathy at the elbow.    Travon Chua MD  Department of Neurology         Nerve Conduction Studies  Motor Sites      Latency Amplitude Neg. Amp Diff Segment Distance Velocity Neg. Dur Neg  Area Diff Temperature Comment   Site (ms) Norm (mV) Norm %  cm m/s Norm ms %  C    Right Fibular (EDB) Motor   Ankle 3.9  < 6.0 3.1  > 2.5  Ankle-EDB 8   5.4      Bel Fib Head 12.6 - 2.8 - -9.7 Bel Fib Head-Ankle 36 41  > 38 5.6 -8.7 30.2    Pop Fossa 14.6 - 1.98 - -29.3 Pop Fossa-Bel Fib Head 8 40  > 38 5.6 -25.0 30.4    Right Median (APB) Motor   Wrist 3.3  < 4.4 7.6  > 5.0  Wrist-APB 8   4.0  33.4    Elbow 7.8 - 6.9  < 54.0 -9.2 Elbow-Wrist 25 56  > 48 4.4 -8.8 33.3    Right Tibial (AHB) Motor   Ankle 4.7  < 6.5 7.1  > 5.0  Ankle-AHB 8   5.1      Knee 15.1 - 3.4 - -52.1 Knee-Ankle 40 38  > 38 5.3 -51.3 30.2    Right Ulnar (ADM) Motor   Wrist 2.8  < 3.5 5.4  > 5.0  Wrist-ADM 8   5.0  24.4    Bel Elbow 7.1 - 5.1 - -5.6 Bel Elbow-Wrist 22 51  > 48 5.1 -6.5 24.4    Abv Elbow 9.5 - 4.9 - -3.9 Abv Elbow-Bel Elbow 9 38  > 48 5.1 -5.1 24.3      Sensory Sites      Onset Lat Peak Lat Amp (O-P) Amp (P-P) Segment Distance Velocity Temperature Comment   Site ms ms  V Norm  V  cm m/s Norm  C    Right Median Sensory   Wrist-Dig II 3.3 4.2 7  > 10 8 Wrist-Dig II 10 30  > 48 32.1    Right Radial Sensory   Forearm-Wrist 1.40 2.1 5  > 15 2 Forearm-Wrist 10 71 - 7    Right Superficial Fibular Sensory   14 cm-Ankle 2.5 3.6 5  > 3 7 14 cm-Ankle 12.5 50  > 38 29.6    Right Sural Sensory   Calf-Lat Mall 3.6 4.4 5  > 5 6 Calf-Lat Mall 14 39  > 38 30.7    Right Ulnar Sensory   Wrist-Dig V 2.0 3.1 5  > 8 5 Wrist-Dig V 12.5 63  > 48 33.7      F Wave Studies     Min-F Max-F Dispersion Persistence Mean-F F-Norm L-R Mean-F L-R Mean-F Norm F/M Ratio F-M Lat (ms)   Right Median (Abd Poll Brev)  32.3  C   30.39 31.95 1.56 100.00 30.98 <33  <2.2 3.11 27.19   Right Tibial (Abd Hallucis)  30.2  C   53.91 57.03 3.12 100.00 56.15 <61  <5.7 2.92 46.72           NCS Waveforms:    Motor                Sensory

## 2023-02-01 NOTE — LETTER
2023       RE: Hilaria Bonner  [address]  [place] [address]     Dear Colleague,    Thank you for referring your patient, Hliaria Bonner, to the [facility] [place] [facility]. Please see a copy of my visit note below.                        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  She also had well documented nutritional deficiencies (B1). This study is performed to assess for interval changes in her neuropathy compared to a study dated 21.     Techniques: Sensory and motor conduction studies were done with surface recording electrodes.    Results:    Nerve conduction studies:   1. Right median-D2 sensory response shows severely slowed CV and moderately reduced amplitude.   2. Right ulnar-D5 and radial sensory responses show normal CV and moderately reduced amplitudes.  3. Right sural and SP sensory responses are normal.   4. Right ulnar-ADM motor response show normal DL, normal amplitude and mild CV slowing across the elbow.  5. Right median-APB, peroneal-EDB, and tibial-AH motor responses are normal.      Interpretation:  This is an abnormal study. As was seen on the 21 study there is electrophysiological evidence of a non length-dependent sensory predominant  neuropathy or ganglionopathy. Today's study shows a modest degree of worsening of most lower limb motor and sensory responses,  although all would still be considered with normal limits. In the upper limbs incidental note is again made of a right sided ulnar neuropathy at the elbow.    Travon Chua MD  Department of Neurology         Nerve Conduction Studies  Motor Sites      Latency Amplitude Neg. Amp Diff Segment Distance Velocity Neg. Dur Neg Area Diff Temperature Comment   Site (ms) Norm (mV) Norm %  cm m/s Norm ms %  C    Right Fibular (EDB) Motor   Ankle 3.9  < 6.0 3.1  > 2.5  Ankle-EDB 8   5.4      Bel Fib Head 12.6 - 2.8 - -9.7 Bel Fib Head-Ankle 36 41  > 38 5.6 -8.7 30.2    Pop Fossa 14.6 - 1.98 - -29.3 Pop Fossa-Bel Fib Head 8 40  > 38 5.6 -25.0 30.4    Right Median (APB) Motor   Wrist 3.3  < 4.4 7.6  > 5.0  Wrist-APB 8   4.0  33.4    Elbow 7.8 - 6.9  < 54.0 -9.2 Elbow-Wrist 25 56  > 48 4.4 -8.8 33.3    Right Tibial (AHB) Motor   Ankle 4.7  < 6.5 7.1  > 5.0  Ankle-AHB 8   5.1      Knee 15.1 - 3.4 - -52.1 Knee-Ankle 40 38  > 38 5.3 -51.3 30.2    Right Ulnar (ADM) Motor   Wrist 2.8  < 3.5 5.4  > 5.0  Wrist-ADM 8   5.0  24.4    Bel Elbow 7.1 - 5.1 - -5.6 Bel Elbow-Wrist 22 51  > 48 5.1 -6.5 24.4    Abv Elbow 9.5 - 4.9 - -3.9 Abv Elbow-Bel Elbow 9 38  > 48 5.1 -5.1 24.3      Sensory Sites      Onset Lat Peak Lat Amp (O-P) Amp (P-P) Segment Distance Velocity Temperature Comment   Site ms ms  V Norm  V  cm m/s Norm  C    Right Median Sensory   Wrist-Dig II 3.3 4.2 7  > 10 8 Wrist-Dig II 10 30  > 48 32.1    Right Radial Sensory   Forearm-Wrist 1.40 2.1 5  > 15 2 Forearm-Wrist 10 71 - 7    Right Superficial Fibular Sensory   14 cm-Ankle 2.5 3.6 5  > 3 7 14 cm-Ankle 12.5 50  > 38 29.6    Right Sural Sensory   Calf-Lat Mall 3.6 4.4 5  > 5 6 Calf-Lat Mall 14 39  > 38 30.7    Right Ulnar Sensory   Wrist-Dig V 2.0 3.1 5  > 8 5 Wrist-Dig V 12.5 63  > 48 33.7      F Wave Studies     Min-F Max-F Dispersion Persistence Mean-F F-Norm L-R Mean-F L-R Mean-F Norm F/M Ratio F-M Lat (ms)   Right Median (Abd Poll Brev)  32.3  C   30.39 31.95 1.56 100.00  30.98 <33  <2.2 3.11 27.19   Right Tibial (Abd Hallucis)  30.2  C   53.91 57.03 3.12 100.00 56.15 <61  <5.7 2.92 46.72           NCS Waveforms:    Motor                Sensory                                 Sincerely,    Travon Chua MD

## 2023-02-03 ENCOUNTER — MEDICAL CORRESPONDENCE (OUTPATIENT)
Dept: HEALTH INFORMATION MANAGEMENT | Facility: CLINIC | Age: 33
End: 2023-02-03

## 2023-02-03 LAB — VIT B1 PYROPHOSHATE BLD-SCNC: 140 NMOL/L

## 2023-02-06 ENCOUNTER — TELEPHONE (OUTPATIENT)
Dept: FAMILY MEDICINE | Facility: CLINIC | Age: 33
End: 2023-02-06
Payer: MEDICARE

## 2023-02-06 NOTE — TELEPHONE ENCOUNTER
Forms/Letter Request    Type of form/letter: forms recieved from [facility]   placed on providers desk for response     Have you been seen for this request:  Do we have the form/letter:  When is form/letter needed by:    How would you like the form/letter returned: fax to [phone]  Patient Notified form requests are processed in 3-5 business days    Could we send this information to you in [facility] or would you prefer to receive a phone call?:

## 2023-02-07 NOTE — TELEPHONE ENCOUNTER
[facility] forms signed will fax to [phone]    Lola TORRES Visit Facilitator

## 2023-02-15 ENCOUNTER — INFUSION THERAPY VISIT (OUTPATIENT)
Dept: INFUSION THERAPY | Facility: CLINIC | Age: 33
End: 2023-02-15
Attending: PSYCHIATRY & NEUROLOGY
Payer: MEDICARE

## 2023-02-15 VITALS
SYSTOLIC BLOOD PRESSURE: 105 MMHG | OXYGEN SATURATION: 99 % | WEIGHT: 251.2 LBS | TEMPERATURE: 97.7 F | DIASTOLIC BLOOD PRESSURE: 73 MMHG | HEART RATE: 105 BPM | BODY MASS INDEX: 40.37 KG/M2 | HEIGHT: 66 IN | RESPIRATION RATE: 16 BRPM

## 2023-02-15 DIAGNOSIS — G62.9 POLYNEUROPATHY: ICD-10-CM

## 2023-02-15 DIAGNOSIS — Z98.84 S/P LAPAROSCOPIC SLEEVE GASTRECTOMY: ICD-10-CM

## 2023-02-15 DIAGNOSIS — G61.81 CHRONIC INFLAMMATORY DEMYELINATING POLYNEUROPATHY (H): Primary | ICD-10-CM

## 2023-02-15 PROCEDURE — 250N000011 HC RX IP 250 OP 636: Performed by: PSYCHIATRY & NEUROLOGY

## 2023-02-15 PROCEDURE — 96365 THER/PROPH/DIAG IV INF INIT: CPT

## 2023-02-15 PROCEDURE — 96375 TX/PRO/DX INJ NEW DRUG ADDON: CPT

## 2023-02-15 PROCEDURE — 96366 THER/PROPH/DIAG IV INF ADDON: CPT

## 2023-02-15 PROCEDURE — 250N000013 HC RX MED GY IP 250 OP 250 PS 637: Performed by: PSYCHIATRY & NEUROLOGY

## 2023-02-15 RX ORDER — METHYLPREDNISOLONE SODIUM SUCCINATE 125 MG/2ML
125 INJECTION, POWDER, LYOPHILIZED, FOR SOLUTION INTRAMUSCULAR; INTRAVENOUS
Status: CANCELLED
Start: 2023-02-15

## 2023-02-15 RX ORDER — ACETAMINOPHEN 325 MG/1
650 TABLET ORAL ONCE
Status: COMPLETED | OUTPATIENT
Start: 2023-02-15 | End: 2023-02-15

## 2023-02-15 RX ORDER — DIPHENHYDRAMINE HCL 25 MG
50 CAPSULE ORAL ONCE
Status: CANCELLED | OUTPATIENT
Start: 2023-02-15

## 2023-02-15 RX ORDER — ACETAMINOPHEN 325 MG/1
650 TABLET ORAL ONCE
Status: CANCELLED
Start: 2023-02-15

## 2023-02-15 RX ORDER — MEPERIDINE HYDROCHLORIDE 25 MG/ML
25 INJECTION INTRAMUSCULAR; INTRAVENOUS; SUBCUTANEOUS EVERY 30 MIN PRN
Status: CANCELLED | OUTPATIENT
Start: 2023-02-15

## 2023-02-15 RX ORDER — DIPHENHYDRAMINE HYDROCHLORIDE 50 MG/ML
50 INJECTION INTRAMUSCULAR; INTRAVENOUS
Status: CANCELLED
Start: 2023-02-15

## 2023-02-15 RX ORDER — EPINEPHRINE 1 MG/ML
0.3 INJECTION, SOLUTION INTRAMUSCULAR; SUBCUTANEOUS EVERY 5 MIN PRN
Status: CANCELLED | OUTPATIENT
Start: 2023-02-15

## 2023-02-15 RX ORDER — HEPARIN SODIUM (PORCINE) LOCK FLUSH IV SOLN 100 UNIT/ML 100 UNIT/ML
5 SOLUTION INTRAVENOUS
Status: CANCELLED | OUTPATIENT
Start: 2023-02-15

## 2023-02-15 RX ORDER — DIPHENHYDRAMINE HCL 25 MG
50 CAPSULE ORAL ONCE
Status: COMPLETED | OUTPATIENT
Start: 2023-02-15 | End: 2023-02-15

## 2023-02-15 RX ORDER — ALBUTEROL SULFATE 0.83 MG/ML
2.5 SOLUTION RESPIRATORY (INHALATION)
Status: CANCELLED | OUTPATIENT
Start: 2023-02-15

## 2023-02-15 RX ORDER — ALBUTEROL SULFATE 90 UG/1
1-2 AEROSOL, METERED RESPIRATORY (INHALATION)
Status: CANCELLED
Start: 2023-02-15

## 2023-02-15 RX ORDER — HEPARIN SODIUM,PORCINE 10 UNIT/ML
5 VIAL (ML) INTRAVENOUS
Status: CANCELLED | OUTPATIENT
Start: 2023-02-15

## 2023-02-15 RX ADMIN — HUMAN IMMUNOGLOBULIN G 60 G: 40 LIQUID INTRAVENOUS at 12:56

## 2023-02-15 RX ADMIN — HYDROCORTISONE SODIUM SUCCINATE 100 MG: 100 INJECTION, POWDER, FOR SOLUTION INTRAMUSCULAR; INTRAVENOUS at 12:39

## 2023-02-15 RX ADMIN — DIPHENHYDRAMINE HYDROCHLORIDE 50 MG: 25 CAPSULE ORAL at 12:22

## 2023-02-15 RX ADMIN — ACETAMINOPHEN 650 MG: 325 TABLET, FILM COATED ORAL at 12:22

## 2023-02-15 ASSESSMENT — PAIN SCALES - GENERAL: PAINLEVEL: EXTREME PAIN (9)

## 2023-02-15 NOTE — PROGRESS NOTES
Infusion Nursing Note:  Hilaria R Bonner presents today for IVIG.    Patient seen by provider today: No   present during visit today: Not Applicable.    Note: N/A.    Intravenous Access:  Peripheral IV placed.    Treatment Conditions:  Not Applicable.    Post Infusion Assessment:  Patient tolerated infusion without incident.  Blood return noted pre and post infusion.  Site patent and intact, free from redness, edema or discomfort.  No evidence of extravasations.  Access discontinued per protocol.     Discharge Plan:   Discharge instructions reviewed with: Patient and Family.  Patient and/or family verbalized understanding of discharge instructions and all questions answered.  AVS to patient via [employer].  Patient will return 3/7/23 for next appointment.   Patient discharged in stable condition accompanied by: mother.  Departure Mode: Ambulatory with personal walker.      Bonnie Bullard RN

## 2023-02-16 RX ORDER — MULTIVITAMIN/IRON/FOLIC ACID 18MG-0.4MG
TABLET ORAL
OUTPATIENT
Start: 2023-02-16

## 2023-02-16 RX ORDER — CALCIUM CARBONATE/VITAMIN D3 500 MG-10
TABLET,CHEWABLE ORAL
OUTPATIENT
Start: 2023-02-16

## 2023-02-16 RX ORDER — PREGABALIN 200 MG/1
CAPSULE ORAL
Qty: 270 CAPSULE | Refills: 0 | Status: SHIPPED | OUTPATIENT
Start: 2023-02-16 | End: 2023-11-30

## 2023-02-16 NOTE — TELEPHONE ENCOUNTER
CALCIUM 500MG W/ VIT D CHW TABLETS  Last Written Prescription Date:   12/1/2021  Last Fill Quantity: 90,   # refills: 3  Last Office Visit :  3/10/2022  Future Office visit:  None  Routing refill request to provider for review/approval because:  Gap in refills.  Last filled Dec 2021  Refer to clinic for review.       B-COMPLEX TABLETS  Last Written Prescription Date:   12/1/2021  Last Fill Quantity: 90,   # refills: 3  Last Office Visit :  3/10/2022  Future Office visit:  None  Routing refill request to provider for review/approval because:  Gap in refills.  Last filled Dec 2021  Refer to clinic for review.     Sophia Hernandez RN  Central Triage Red Flags/Med Refills

## 2023-02-16 NOTE — TELEPHONE ENCOUNTER
[place]    last [place] 1/11/23 for 90 pregabalin   Has appointment with me next month.   Med refilled

## 2023-02-17 ENCOUNTER — MEDICAL CORRESPONDENCE (OUTPATIENT)
Dept: HEALTH INFORMATION MANAGEMENT | Facility: CLINIC | Age: 33
End: 2023-02-17

## 2023-02-17 ENCOUNTER — TELEPHONE (OUTPATIENT)
Dept: FAMILY MEDICINE | Facility: CLINIC | Age: 33
End: 2023-02-17
Payer: MEDICARE

## 2023-02-17 NOTE — TELEPHONE ENCOUNTER
Forms/Letter Request    Type of form/letter: [facility]    Have you been seen for this request: N/A    Do we have the form/letter: Yes: placed in [employer] [facility] forms in basket for review    When is form/letter needed by: ASAP    How would you like the form/letter returned: Fax  [phone]

## 2023-02-17 NOTE — TELEPHONE ENCOUNTER
RN [facility] Stella, [facility], and LVM to call clinic at [phone]. Voicemail did not confirm that it was a secure line.     If Stella calls back please relay provider approval of orders below.    Dorothy Nash RN  [facility]

## 2023-02-17 NOTE — TELEPHONE ENCOUNTER
Reason for Call: Request for an order or referral:    Order or referral being requested: Verbal Order    Date needed: as soon as possible    Has the patient been seen by the PCP for this problem? YES    Additional comments: to continue OT for 2x a week for 3 weeks in home to address cognition, conditioning and exersize program and independence with her ADLs and IADLs    Phone number Patient can be reached at:  Other phone number:  [phone]    Best Time:  any    Can we leave a detailed message on this number?  YES    Call taken on 2/17/2023 at 8:34 AM by Talisha Oliveira

## 2023-02-20 ENCOUNTER — TELEPHONE (OUTPATIENT)
Dept: FAMILY MEDICINE | Facility: CLINIC | Age: 33
End: 2023-02-20
Payer: MEDICARE

## 2023-02-20 ENCOUNTER — MYC MEDICAL ADVICE (OUTPATIENT)
Dept: FAMILY MEDICINE | Facility: CLINIC | Age: 33
End: 2023-02-20
Payer: MEDICARE

## 2023-02-20 NOTE — TELEPHONE ENCOUNTER
Extension of previous [employer] message, please reference that encounter for addition details.    aFtoumata Mendoza RN    [facility]

## 2023-02-20 NOTE — TELEPHONE ENCOUNTER
Forms/Letter Request    Type of form/letter:  Form received from [facility]  Left on providers desk for completion    Have you been seen for this request:     Do we have the form/letter: Yes    When is form/letter needed by:     How would you like the form/letter returned: Fax [phone]    Patient Notified form requests are processed in 3-5 business days      Jacquelin ALCANTAR - Visit Facilitator

## 2023-02-20 NOTE — TELEPHONE ENCOUNTER
RN called Stella and left detailed VM of provider approval of orders below. Left clinic number [phone] to call back for any further questions or concerns.    Dorothy Nash RN  [facility]

## 2023-02-23 NOTE — TELEPHONE ENCOUNTER
IVIG infusion scheduled for 3/2 at [facility].      Bertha Ruelas RN

## 2023-02-28 ENCOUNTER — TELEPHONE (OUTPATIENT)
Dept: FAMILY MEDICINE | Facility: CLINIC | Age: 33
End: 2023-02-28

## 2023-02-28 ENCOUNTER — VIRTUAL VISIT (OUTPATIENT)
Dept: FAMILY MEDICINE | Facility: CLINIC | Age: 33
End: 2023-02-28
Payer: MEDICARE

## 2023-02-28 DIAGNOSIS — F41.1 GAD (GENERALIZED ANXIETY DISORDER): Chronic | ICD-10-CM

## 2023-02-28 DIAGNOSIS — S06.9XAS COGNITIVE AND NEUROBEHAVIORAL DYSFUNCTION FOLLOWING BRAIN INJURY (H): ICD-10-CM

## 2023-02-28 DIAGNOSIS — F09 COGNITIVE AND NEUROBEHAVIORAL DYSFUNCTION FOLLOWING BRAIN INJURY (H): ICD-10-CM

## 2023-02-28 DIAGNOSIS — G31.89 COGNITIVE AND NEUROBEHAVIORAL DYSFUNCTION FOLLOWING BRAIN INJURY (H): ICD-10-CM

## 2023-02-28 DIAGNOSIS — E11.65 TYPE 2 DIABETES MELLITUS WITH HYPERGLYCEMIA, WITHOUT LONG-TERM CURRENT USE OF INSULIN (H): Primary | ICD-10-CM

## 2023-02-28 DIAGNOSIS — I10 BENIGN ESSENTIAL HYPERTENSION: ICD-10-CM

## 2023-02-28 DIAGNOSIS — F33.1 MODERATE EPISODE OF RECURRENT MAJOR DEPRESSIVE DISORDER (H): Chronic | ICD-10-CM

## 2023-02-28 DIAGNOSIS — Z98.84 S/P LAPAROSCOPIC SLEEVE GASTRECTOMY: ICD-10-CM

## 2023-02-28 PROCEDURE — 99442 PR PHYSICIAN TELEPHONE EVALUATION 11-20 MIN: CPT | Mod: 95 | Performed by: NURSE PRACTITIONER

## 2023-02-28 RX ORDER — METFORMIN HCL 500 MG
500 TABLET, EXTENDED RELEASE 24 HR ORAL
Qty: 90 TABLET | Refills: 1 | Status: SHIPPED | OUTPATIENT
Start: 2023-02-28 | End: 2023-11-30

## 2023-02-28 RX ORDER — VENLAFAXINE HYDROCHLORIDE 150 MG/1
150 CAPSULE, EXTENDED RELEASE ORAL DAILY
Qty: 90 CAPSULE | Refills: 1 | Status: SHIPPED | OUTPATIENT
Start: 2023-02-28 | End: 2023-03-23

## 2023-02-28 RX ORDER — POTASSIUM CHLORIDE 750 MG/1
TABLET, EXTENDED RELEASE ORAL
Qty: 120 TABLET | Refills: 4 | Status: SHIPPED | OUTPATIENT
Start: 2023-02-28 | End: 2023-11-30

## 2023-02-28 NOTE — TELEPHONE ENCOUNTER
RN called patient to inform her that she will need to reach out to psychiatrist for refills of several medications. Patient verbalized understanding and states she will have her mother call to get refills. Patient states her mother requested the refills and she was unsure who to contact for most of the medications.     No further questions or concerns at this time.           Fatoumata Mendoza RN    [facility]

## 2023-02-28 NOTE — PROGRESS NOTES
Hilaria is a 32 year old who is being evaluated via a billable telephone  visit.      How would you like to obtain your AVS? MyChart  If the video visit is dropped, the invitation should be resent by: Text to cell phone: [phone]  Will anyone else be joining your video visit? No          Subjective   Hilaria is a 32 year old accompanied by her mother, presenting for the following health issues:  Recheck Medication      History of Present Illness       Reason for visit:  Medication check    She eats 2-3 servings of fruits and vegetables daily.She consumes 3 sweetened beverage(s) daily.She exercises with enough effort to increase her heart rate 9 or less minutes per day.  She exercises with enough effort to increase her heart rate 3 or less days per week.   She is taking medications regularly.    mother is initiating  visit to update refills on patient medications    [facility] so she knows which are filled by primary care and which should be filled by her other specialist such as psychiatry     Diabetes Mellitus  Patient presents for follow up of diabetes. Current symptoms include: none. Symptoms have stabilized. Patient denies foot ulcerations, polydipsia and polyuria. Evaluation to date has included: hemoglobin A1C.. Current treatment: Continued metformin which has been somewhat effective.    HYPERTENSION--  Patient indicates feeling well and denies any symptoms referable to  elevated blood pressure. Specifically denies chest pain, palpitations, dyspnea, orthopnea, PND or peripheral edema.  Blood pressure readings have not been in normal range.  Current medication regimen is as listed below. Patient denies any side effects of medication.                                                                                                                                                                                  Social History     Tobacco Use     Smoking status: Former      Packs/day: 0.25     Years: 1.00     Pack years: 0.25     Types: Cigarettes, Vaping Device     Start date: 11/20/2016     Smokeless tobacco: Never     Tobacco comments:     Vaping daily    Vaping Use     Vaping Use: Never used   Substance Use Topics     Alcohol use: Not Currently     Comment: Quit drinking when last hospitalized     Drug use: Not Currently     Types: Marijuana     PHQ 9/19/2022 1/24/2023 1/31/2023   PHQ-9 Total Score 24 8 16   Q9: Thoughts of better off dead/self-harm past 2 weeks Not at all Several days Not at all   F/U: Thoughts of suicide or self-harm - No -   F/U: Safety concerns - No -   PHQ-A Total Score - - -   PHQ-A Mood affect on daily activities - - -   PHQ-A Suicide Ideation past 2 weeks - - -     MELODY-7 SCORE 7/25/2022 9/19/2022 1/31/2023   Total Score 18 (severe anxiety) 19 (severe anxiety) -   Total Score 18 19 17     Last PHQ-9 1/31/2023   1.  Little interest or pleasure in doing things 1   2.  Feeling down, depressed, or hopeless 3   3.  Trouble falling or staying asleep, or sleeping too much 2   4.  Feeling tired or having little energy 2   5.  Poor appetite or overeating 1   6.  Feeling bad about yourself 3   7.  Trouble concentrating 2   8.  Moving slowly or restless 2   Q9: Thoughts of better off dead/self-harm past 2 weeks 0   PHQ-9 Total Score 16   Difficulty at work, home, or with people Very difficult   In the past two weeks have you had thoughts of suicide or self harm? -   Do you have concerns about your personal safety or the safety of others? -     MELODY-7  1/31/2023   1. Feeling nervous, anxious, or on edge 3   2. Not being able to stop or control worrying 3   3. Worrying too much about different things 3   4. Trouble relaxing 2   5. Being so restless that it is hard to sit still 1   6. Becoming easily annoyed or irritable 2   7. Feeling afraid, as if something awful might happen 3   MELODY-7 Total Score 17   If you checked any problems, how difficult have they made it for  you to do your work, take care of things at home, or get along with other people? -       Suicide Assessment Five-step Evaluation and Treatment (SAFE-T)  Labs reviewed in EPIC  BP Readings from Last 3 Encounters:   02/15/23 105/73   23 108/71   23 134/82    Wt Readings from Last 3 Encounters:   02/15/23 113.9 kg (251 lb 3.2 oz)   22 118.8 kg (262 lb)   22 119.3 kg (263 lb)                  Patient Active Problem List   Diagnosis     Vitamin D deficiency     Encounter for smoking cessation counseling     Menorrhagia with irregular cycle     Pulmonary embolism with infarction (H)     Secondary hyperparathyroidism, not elsewhere classified (H)     Paresthesias     Hemorrhoids, unspecified hemorrhoid type     Chronic inflammatory demyelinating polyneuropathy (H)     S/P laparoscopic sleeve gastrectomy     Morbid obesity (H)     Moderate major depression (H)     Wernicke-Korsakoff syndrome (alcoholic) (H)     Cognitive and neurobehavioral dysfunction following brain injury (H)     Psychosis, unspecified psychosis type (H)     MELODY (generalized anxiety disorder)     Medical cannabis use     Tobacco use     Compression fracture of T9 vertebra with routine healing, subsequent encounter     Benign essential hypertension     History of pulmonary embolism     KENDALL (obstructive sleep apnea)- mild (AHI 6)     Type 2 diabetes mellitus with hyperglycemia, without long-term current use of insulin (H)     Vitamin B12 deficiency (non anemic)     Chronic anticoagulation     Elevated LFTs     Acute kidney injury (H)     Elevated lactic acid level     Gastrointestinal hemorrhage, unspecified gastrointestinal hemorrhage type     Hypotension due to hypovolemia     Cervical high risk HPV (human papillomavirus) test positive     Urinary incontinence     Decubitus ulcer of left hip     Pancreatic insufficiency     Past Surgical History:   Procedure Laterality Date      SECTION       COLONOSCOPY N/A 2022     Procedure: COLONOSCOPY;  Surgeon: Chandrakant Toledo MD;  Location: [facility]     ESOPHAGOSCOPY, GASTROSCOPY, DUODENOSCOPY (EGD), COMBINED N/A 7/28/2022    Procedure: ESOPHAGOGASTRODUODENOSCOPY, WITH BIOPSY;  Surgeon: Chandrakant Toledo MD;  [facility]     EXAM UNDER ANESTHESIA ANUS N/A 8/3/2022    Procedure: EXAM UNDER ANESTHESIA, ANUS;  Surgeon: Chip Way MD;  Location: [facility]     HEMORRHOIDECTOMY EXTERNAL N/A 8/3/2022    Procedure: HEMORRHOIDECTOMY, EXTERNAL;  Surgeon: Chip Way MD;  Location: [facility]     LAPAROSCOPIC GASTRIC SLEEVE N/A 03/26/2019    Procedure: Laparoscopic Sleeve Gastrectomy;  Surgeon: Luan Lopez MD;  Location: [facility]     ORTHOPEDIC SURGERY      2 knee meniscus surgery       Social History     Tobacco Use     Smoking status: Former     Packs/day: 0.25     Years: 1.00     Pack years: 0.25     Types: Cigarettes, Vaping Device     Start date: 11/20/2016     Smokeless tobacco: Never     Tobacco comments:     Vaping daily    Substance Use Topics     Alcohol use: Not Currently     Comment: Quit drinking when last hospitalized     Family History   Problem Relation Age of Onset     Pulmonary Embolism Mother      Diabetes Father      Hypertension Father      Breast Cancer Sister 26        surgery only     Cancer Sister      Breast Cancer Sister      Mental Illness Sister         Bipolar     Coronary Artery Disease Other         paternal aunt     Thyroid Disease Other         neice     Coronary Artery Disease Other      Cerebrovascular Disease Other      Thyroid Disease Other      Liver Disease No family hx of      Colon Cancer No family hx of      Skin Cancer No family hx of      Melanoma No family hx of          Current Outpatient Medications   Medication Sig Dispense Refill     acetaminophen (TYLENOL) 500 MG tablet Take 500-1,000 mg by mouth every 6 hours as needed       ammonium lactate (AMLACTIN) 12 % external cream APPLY TOPICALLY TO THE AFFECTED AREA TWICE DAILY 385  g 0     apixaban ANTICOAGULANT (ELIQUIS) 5 MG tablet Take 1 tablet (5 mg) by mouth 2 times daily 60 tablet 3     ARIPiprazole (ABILIFY) 10 MG tablet Take 1 tablet by mouth See Admin Instructions       Biotin 5000 MCG TABS Take 1 tablet by mouth daily       blood glucose (NO BRAND SPECIFIED) lancets standard Use to test blood sugar 2 times daily or as directed. 100 lancet 3     blood glucose (NO BRAND SPECIFIED) test strip Use to test blood sugar 1 times daily or as directed. To accompany: Blood Glucose Monitor Brands: per insurance. 100 strip 11     blood glucose calibration (NO BRAND SPECIFIED) solution To accompany: Blood Glucose Monitor Brands: per insurance. 1 each 0     blood glucose monitoring (NO BRAND SPECIFIED) meter device kit Use to test blood sugar 1 times daily or as directed. Preferred blood glucose meter OR supplies to accompany: Blood Glucose Monitor Brands: per insurance. 1 kit 0     calcium Citrate-vitamin D 500-400 MG-UNIT CHEW Take 1 chew tab by mouth 3 times daily 90 tablet 11     cholecalciferol 50 MCG (2000 UT) tablet Take 2,000 Units by mouth daily       diphenhydrAMINE (BENADRYL) 50 MG capsule Take 50 mg by mouth every 6 hours as needed for allergies or other (prior to infusion)       EPINEPHrine (ANY BX GENERIC EQUIV) 0.3 MG/0.3ML injection 2-pack        erythromycin (ROMYCIN) 5 MG/GM ophthalmic ointment Place 0.5 inches into both eyes 2 times daily 3.5 g 1     ferrous sulfate (FEROSUL) 325 (65 Fe) MG tablet Take 325 mg by mouth every 48 hours       fluocinonide (LIDEX) 0.05 % external cream Apply topically 2 times daily To hands/feet if rash not resolved with ketoconazole cream 60 g 0     folic acid (FOLVITE) 1 MG tablet Take 1 tablet (1 mg) by mouth daily 90 tablet 3     furosemide (LASIX) 40 MG tablet Take 1 tablet by mouth See Admin Instructions       hydrOXYzine (ATARAX) 25 MG tablet Take 1-2 tablets (25-50 mg) by mouth every 6 hours as needed for anxiety 180 tablet 1     hydrOXYzine  (VISTARIL) 25 MG capsule Take 25 mg by mouth every 6 hours as needed       ketoconazole (NIZORAL) 2 % external cream Apply topically daily To hands/feet 60 g 11     Lidocaine (LIDOCARE) 4 % Patch Place 1 patch onto the skin See Admin Instructions       lipase-protease-amylase (CREON 12) 55963-42056-62874 units CPEP Take 1 capsule by mouth 4 times daily       medroxyPROGESTERone (DEPO-PROVERA) 150 MG/ML IM injection Inject 150 mg into the muscle every 3 months       metFORMIN (GLUCOPHAGE XR) 500 MG 24 hr tablet Take 1 tablet (500 mg) by mouth daily (with dinner) 90 tablet 1     Multiple Vitamin (ONE-A-DAY ESSENTIAL) TABS Take 1 tablet by mouth daily       Multiple Vitamins-Minerals (MULTIVITAMIN ADULT) CHEW Take 1 chew tab by mouth 2 times daily 60 tablet 11     OLANZapine (ZYPREXA) 10 MG tablet TAKE 1 TABLET(10 MG) BY MOUTH AT BEDTIME 30 tablet 3     OLANZapine (ZYPREXA) 2.5 MG tablet Take 1 tablet (2.5 mg) by mouth 2 times daily as needed (Anxiety, agitation or psychosis) 60 tablet 1     omeprazole (PRILOSEC) 20 MG DR capsule Take 1 capsule (20 mg) by mouth 2 times daily 180 capsule 3     ondansetron (ZOFRAN ODT) 4 MG ODT tab Place 4 mg under the tongue every 8 hours as needed       ondansetron (ZOFRAN-ODT) 4 MG ODT tab DISSOLVE 1 TABLET(4 MG) ON THE TONGUE EVERY 8 HOURS AS NEEDED FOR NAUSEA 30 tablet 1     polyethylene glycol (MIRALAX) 17 GM/Dose powder Take 17 g by mouth daily       polyethylene glycol (MIRALAX) 17 GM/SCOOP powder Take 17 g (1 capful) by mouth daily 850 g 3     potassium chloride ER (K-TAB/KLOR-CON) 10 MEQ CR tablet TAKE 2 TABLETS(20 MEQ) BY MOUTH TWICE DAILY 120 tablet 0     potassium chloride ER (KLOR-CON M) 10 MEQ CR tablet Take 1 tablet by mouth See Admin Instructions       prazosin (MINIPRESS) 1 MG capsule TAKE 2 CAPSULES(2 MG) BY MOUTH AT BEDTIME 60 capsule 2     Pregabalin (LYRICA) 200 MG capsule TAKE 1 CAPSULE(200 MG) BY MOUTH THREE TIMES DAILY 270 capsule 0     Pregabalin (LYRICA) 200 MG  capsule Take 200 mg by mouth       PRIVIGEN 20 GM/200ML SOLN Inject 20 g into the vein every 28 days For total dose of 60 g       PRIVIGEN 40 GM/400ML SOLN Inject 40 g into the vein every 28 days For total dose of 60 g       senna-docusate (SENOKOT-S/PERICOLACE) 8.6-50 MG tablet Take 1 tablet by mouth       SOLU-CORTEF 100 MG injection Inject 100 mg into the muscle every 30 days       sulfamethoxazole-trimethoprim (BACTRIM DS) 800-160 MG tablet Take 1 tablet by mouth 2 times daily 6 tablet 0     thiamine (B-1) 100 MG tablet Take 1 tablet by mouth See Admin Instructions       thin (NO BRAND SPECIFIED) lancets Use with lanceting device. To accompany: Blood Glucose Monitor Brands: per insurance. 90 each 11     thin (NO BRAND SPECIFIED) lancets Use with lanceting device. To accompany: Blood Glucose Monitor Brands: per insurance. 100 each 11     venlafaxine (EFFEXOR XR) 150 MG 24 hr capsule Take 1 capsule (150 mg) by mouth daily 90 capsule 1     vitamin B-Complex Take 1 tablet by mouth daily 90 tablet 3     vitamin C (ASCORBIC ACID) 1000 MG TABS Take 1 tablet (1,000 mg) by mouth daily 90 tablet 3     vitamin D3 (CHOLECALCIFEROL) 50 mcg (2000 units) tablet Take 1 tablet (50 mcg) by mouth daily 90 tablet 3     No Known Allergies  Recent Labs   Lab Test 01/24/23  1246 09/12/22  0545 08/03/22  0629 08/02/22  0719 07/26/22  1320 07/26/22  1143 02/15/22  1157 11/04/21  1017 10/22/21  0950 07/13/21  1240 06/11/21  1221 03/02/21  0931 02/07/21  0000 02/06/21  0000 01/28/21  0921 12/17/20  0902   A1C 6.0*  --   --   --   --  5.4 6.1*  --   --    < >  --  5.0  --   --   --  5.7*   LDL  --   --   --   --   --   --  82  --  118*  --   --   --   --   --   --  112*   HDL  --   --   --   --   --   --  77  --  47*  --   --   --   --   --   --  68   TRIG  --   --   --   --   --   --  130  --  155*  --   --   --   --  263*  --  102   ALT 19 9*  --  102*   < > 134* 89*   < > 62*  --   --   --    < >  --   --  22   CR 0.57 0.70   < > 1.09*    < > 2.18* 0.70  --  0.88   < > 0.91 0.67   < >  --    < > 0.70   GFRESTIMATED >90 >90   < > 69   < > 30* >90  --  88   < > 85 >90   < >  --    < > >90   GFRESTBLACK  --   --   --   --   --   --   --   --   --   --  >90 >90   < >  --    < > >90   POTASSIUM 4.5 2.8*   < > 3.7   < > 4.3 3.5  --  3.8   < > 3.6 4.1   < >  --    < > 3.7   TSH  --   --   --   --   --  3.90 1.06  --   --    < >  --   --   --   --   --   --     < > = values in this interval not displayed.        Review of Systems   Constitutional, HEENT, cardiovascular, pulmonary, gi and gu systems are negative, except as otherwise noted.      Objective           Vitals:  No vitals were obtained today due to virtual visit.    Physical Exam   No exam completed due to telephone visit.  General:  Alert and oriented  // Respiratory: No coughing, wheezing, or shortness of breath // Psychiatric: Normal affect, tone, and pace of words  General: Patient  heard in background of phone call, vocalizing without stridor or coughing  Diagnostic Test Results:   Diagnostic Test Results:  Labs reviewed in Epic      Assessment & Plan     Type 2 diabetes mellitus with hyperglycemia, without long-term current use of insulin (H)  Continue current medications as prescribed.   - metFORMIN (GLUCOPHAGE XR) 500 MG 24 hr tablet  Dispense: 90 tablet; Refill: 1    Cognitive and neurobehavioral dysfunction following brain injury (H)  FOLLOW UP WITH SPECIALIST :Psychiatry      Benign essential hypertension  HTN Plan:  1)  Medication: continue current medication regimen unchanged  2)  Dietary sodium restriction  3)  Regular aerobic exercise  4)  Recheck in 1 month, sooner should new symptoms or   problems arise.  5) See todays orders.    Patient Education: Reviewed risks of hypertension and principles of   treatment.  - potassium chloride ER (K-TAB/KLOR-CON) 10 MEQ CR tablet  Dispense: 120 tablet; Refill: 4    Moderate episode of recurrent major depressive disorder (H)  FOLLOW UP WITH  SPECIALIST :Psychiatry      MELODY (generalized anxiety disorder)  FOLLOW UP WITH SPECIALIST :Psychiatry    - venlafaxine (EFFEXOR XR) 150 MG 24 hr capsule  Dispense: 90 capsule; Refill: 1    S/P laparoscopic sleeve gastrectomy  - Calcium Citrate-Vitamin D 500-10 MG-MCG CHEW  Dispense: 90 tablet; Refill: 1    An appointment has been made for you with Dr Warren Torres on Wednesday March 29, 2023 at 3:00 pm.   Review of prior external note(s) from - [facility]where information from [facility] reviewed  Prescription drug management   Time spent doing chart review, history and exam, documentation and further activities per the note       See Patient Instructions  Patient Instructions     PLAN:   1.   Symptomatic therapy suggested: Continue current medications as prescribed.  2.  Orders Placed This Encounter   Medications     potassium chloride ER (K-TAB/KLOR-CON) 10 MEQ CR tablet     Sig: TAKE 2 TABLETS(20 MEQ) BY MOUTH TWICE DAILY     Dispense:  120 tablet     Refill:  4     metFORMIN (GLUCOPHAGE XR) 500 MG 24 hr tablet     Sig: Take 1 tablet (500 mg) by mouth daily (with dinner)     Dispense:  90 tablet     Refill:  1     venlafaxine (EFFEXOR XR) 150 MG 24 hr capsule     Sig: Take 1 capsule (150 mg) by mouth daily     Dispense:  90 capsule     Refill:  1     Calcium Citrate-Vitamin D 500-10 MG-MCG CHEW     Sig: Take 1 chew tab by mouth 3 times daily     Dispense:  90 tablet     Refill:  1     3. Patient needs to follow up in if no improvement,or sooner if worsening of symptoms or other symptoms develop.  Follow up in 3 weeks as planned with PCP         Appointments in Next Year    Feb 28, 2023  5:30 PM  (Arrive by 5:10 PM)  Provider Visit with RG Echevarria CNP  [facility] ([facility] ) [phone]   Mar 01, 2023  9:00 AM  GENET New with Piper Schuler RPH  [facility] ([facility] ) [phone]   Mar  07, 2023  8:00 AM  Infusion Visit with  CANCER INFUSION NURSE,  INFUSION CHAIR 12  [facility] ([facility] ) [phone]   Mar 21, 2023  8:00 AM  (Arrive by 7:40 AM)  Provider Visit with Crissy Madrigal PA-C  [facility] ([facility] ) [phone]   Mar 28, 2023  8:30 AM  Infusion Visit with  CANCER INFUSION NURSE,  INFUSION CHAIR 7  [facility] ([facility] ) [phone]   Mar 29, 2023 12:00 PM  (Arrive by 11:45 AM)  Return Neuropathy Visit with Travon Chua MD  [facility] [place] ([facility] ) [phone]   Mar 29, 2023  3:00 PM  (Arrive by 2:45 PM)  Adult Med Follow UP with Warren Torres MD  [facility] [place] ([facility]) [phone]   Apr 07, 2023 10:00 AM  (Arrive by 9:45 AM)  Return Sleep Patient with GUY Sousa  [facility] [place] ([facility] ) [phone]   Apr 07, 2023 11:00 AM  Infusion 360 with  SIP[facility] INFUSION NURSE, UC 47 SIPC  [facility] [place] ([facility] ) [phone]   Apr 11, 2023 12:00 PM  (Arrive by 11:45 AM)  Adult Psychotherapy with JESSICA Reagan  [facility] [place] ([facility]) [phone]   Apr 26, 2023 12:00 PM  (Arrive by 11:45 AM)  Adult Psychotherapy with JESSICA Reagan  [facility] [place] ([facility]) [phone]          Return in about 3 weeks (around 3/21/2023), or if symptoms worsen or fail to improve.  Telephone visit 17  minutes   RG Echevarria CNP  [facility] Location (provider location):  On-site  Platform used for Video Visit: [place]

## 2023-02-28 NOTE — PATIENT INSTRUCTIONS
PLAN:   1.   Symptomatic therapy suggested: Continue current medications as prescribed.  2.  Orders Placed This Encounter   Medications    potassium chloride ER (K-TAB/KLOR-CON) 10 MEQ CR tablet     Sig: TAKE 2 TABLETS(20 MEQ) BY MOUTH TWICE DAILY     Dispense:  120 tablet     Refill:  4    metFORMIN (GLUCOPHAGE XR) 500 MG 24 hr tablet     Sig: Take 1 tablet (500 mg) by mouth daily (with dinner)     Dispense:  90 tablet     Refill:  1    venlafaxine (EFFEXOR XR) 150 MG 24 hr capsule     Sig: Take 1 capsule (150 mg) by mouth daily     Dispense:  90 capsule     Refill:  1    Calcium Citrate-Vitamin D 500-10 MG-MCG CHEW     Sig: Take 1 chew tab by mouth 3 times daily     Dispense:  90 tablet     Refill:  1     3. Patient needs to follow up in if no improvement,or sooner if worsening of symptoms or other symptoms develop.  Follow up in 3 weeks as planned with PCP

## 2023-03-08 ENCOUNTER — MEDICAL CORRESPONDENCE (OUTPATIENT)
Dept: HEALTH INFORMATION MANAGEMENT | Facility: CLINIC | Age: 33
End: 2023-03-08

## 2023-03-08 ENCOUNTER — TELEPHONE (OUTPATIENT)
Dept: FAMILY MEDICINE | Facility: CLINIC | Age: 33
End: 2023-03-08

## 2023-03-08 NOTE — TELEPHONE ENCOUNTER
Forms/Letter Request    Type of form/letter: [facility] forms need to be signed by provider will place on desk for response.     Have you been seen for this request: N/A    Do we have the form/letter: Yes:     When is form/letter needed by: asap    How would you like the form/letter returned: Fax number- [phone]

## 2023-03-09 ENCOUNTER — TELEPHONE (OUTPATIENT)
Dept: FAMILY MEDICINE | Facility: CLINIC | Age: 33
End: 2023-03-09

## 2023-03-10 ENCOUNTER — TELEPHONE (OUTPATIENT)
Dept: FAMILY MEDICINE | Facility: CLINIC | Age: 33
End: 2023-03-10
Payer: MEDICARE

## 2023-03-10 ENCOUNTER — MEDICAL CORRESPONDENCE (OUTPATIENT)
Dept: HEALTH INFORMATION MANAGEMENT | Facility: CLINIC | Age: 33
End: 2023-03-10

## 2023-03-10 NOTE — TELEPHONE ENCOUNTER
[facility] forms signed by provider will fax forms to [phone]    Lola TORRES Visit Facilitator

## 2023-03-10 NOTE — TELEPHONE ENCOUNTER
Forms/Letter Request    Type of form/letter: [facility] forms need to be signed will place on providers desk for response.     Have you been seen for this request: N/A    Do we have the form/letter: Yes:     When is form/letter needed by: asap    How would you like the form/letter returned: Fax number- [phone]

## 2023-03-13 ENCOUNTER — INFUSION THERAPY VISIT (OUTPATIENT)
Dept: INFUSION THERAPY | Facility: CLINIC | Age: 33
End: 2023-03-13
Attending: NURSE PRACTITIONER
Payer: MEDICARE

## 2023-03-13 ENCOUNTER — NURSE TRIAGE (OUTPATIENT)
Dept: NURSING | Facility: CLINIC | Age: 33
End: 2023-03-13

## 2023-03-13 VITALS
TEMPERATURE: 98.3 F | BODY MASS INDEX: 41 KG/M2 | OXYGEN SATURATION: 96 % | WEIGHT: 254 LBS | SYSTOLIC BLOOD PRESSURE: 112 MMHG | RESPIRATION RATE: 20 BRPM | DIASTOLIC BLOOD PRESSURE: 79 MMHG | HEART RATE: 94 BPM

## 2023-03-13 DIAGNOSIS — G61.81 CHRONIC INFLAMMATORY DEMYELINATING POLYNEUROPATHY (H): Primary | ICD-10-CM

## 2023-03-13 PROCEDURE — 96365 THER/PROPH/DIAG IV INF INIT: CPT

## 2023-03-13 PROCEDURE — 250N000011 HC RX IP 250 OP 636: Performed by: PSYCHIATRY & NEUROLOGY

## 2023-03-13 PROCEDURE — 258N000003 HC RX IP 258 OP 636: Performed by: PSYCHIATRY & NEUROLOGY

## 2023-03-13 PROCEDURE — 250N000013 HC RX MED GY IP 250 OP 250 PS 637: Performed by: PSYCHIATRY & NEUROLOGY

## 2023-03-13 PROCEDURE — 96375 TX/PRO/DX INJ NEW DRUG ADDON: CPT

## 2023-03-13 PROCEDURE — 96366 THER/PROPH/DIAG IV INF ADDON: CPT

## 2023-03-13 RX ORDER — METHYLPREDNISOLONE SODIUM SUCCINATE 125 MG/2ML
125 INJECTION, POWDER, LYOPHILIZED, FOR SOLUTION INTRAMUSCULAR; INTRAVENOUS
Status: CANCELLED
Start: 2023-03-13

## 2023-03-13 RX ORDER — HEPARIN SODIUM,PORCINE 10 UNIT/ML
5 VIAL (ML) INTRAVENOUS
Status: CANCELLED | OUTPATIENT
Start: 2023-03-13

## 2023-03-13 RX ORDER — ALBUTEROL SULFATE 90 UG/1
1-2 AEROSOL, METERED RESPIRATORY (INHALATION)
Status: CANCELLED
Start: 2023-03-13

## 2023-03-13 RX ORDER — ACETAMINOPHEN 325 MG/1
650 TABLET ORAL ONCE
Status: CANCELLED
Start: 2023-03-13

## 2023-03-13 RX ORDER — DIPHENHYDRAMINE HYDROCHLORIDE 50 MG/ML
50 INJECTION INTRAMUSCULAR; INTRAVENOUS
Status: CANCELLED
Start: 2023-03-13

## 2023-03-13 RX ORDER — DIPHENHYDRAMINE HCL 25 MG
50 CAPSULE ORAL ONCE
Status: CANCELLED | OUTPATIENT
Start: 2023-03-13

## 2023-03-13 RX ORDER — HEPARIN SODIUM (PORCINE) LOCK FLUSH IV SOLN 100 UNIT/ML 100 UNIT/ML
5 SOLUTION INTRAVENOUS
Status: CANCELLED | OUTPATIENT
Start: 2023-03-13

## 2023-03-13 RX ORDER — DIPHENHYDRAMINE HCL 25 MG
50 CAPSULE ORAL ONCE
Status: COMPLETED | OUTPATIENT
Start: 2023-03-13 | End: 2023-03-13

## 2023-03-13 RX ORDER — MEPERIDINE HYDROCHLORIDE 25 MG/ML
25 INJECTION INTRAMUSCULAR; INTRAVENOUS; SUBCUTANEOUS EVERY 30 MIN PRN
Status: CANCELLED | OUTPATIENT
Start: 2023-03-13

## 2023-03-13 RX ORDER — EPINEPHRINE 1 MG/ML
0.3 INJECTION, SOLUTION INTRAMUSCULAR; SUBCUTANEOUS EVERY 5 MIN PRN
Status: CANCELLED | OUTPATIENT
Start: 2023-03-13

## 2023-03-13 RX ORDER — ACETAMINOPHEN 325 MG/1
650 TABLET ORAL ONCE
Status: COMPLETED | OUTPATIENT
Start: 2023-03-13 | End: 2023-03-13

## 2023-03-13 RX ORDER — ALBUTEROL SULFATE 0.83 MG/ML
2.5 SOLUTION RESPIRATORY (INHALATION)
Status: CANCELLED | OUTPATIENT
Start: 2023-03-13

## 2023-03-13 RX ADMIN — SODIUM CHLORIDE 250 ML: 9 INJECTION, SOLUTION INTRAVENOUS at 08:41

## 2023-03-13 RX ADMIN — HYDROCORTISONE SODIUM SUCCINATE 100 MG: 100 INJECTION, POWDER, FOR SOLUTION INTRAMUSCULAR; INTRAVENOUS at 08:46

## 2023-03-13 RX ADMIN — ACETAMINOPHEN 650 MG: 325 TABLET, FILM COATED ORAL at 08:41

## 2023-03-13 RX ADMIN — HUMAN IMMUNOGLOBULIN G 60 G: 40 LIQUID INTRAVENOUS at 08:58

## 2023-03-13 RX ADMIN — DIPHENHYDRAMINE HYDROCHLORIDE 50 MG: 25 CAPSULE ORAL at 08:41

## 2023-03-13 ASSESSMENT — PAIN SCALES - GENERAL: PAINLEVEL: NO PAIN (0)

## 2023-03-13 NOTE — TELEPHONE ENCOUNTER
Triage Call:    Maria Eugenia calling from [facility] to request NPI information for patient's provider.  She needs the information for [place] assessment and continuation of wound care treatments.  Provided caller with requested information.    No additional concerns or questions.    Ayla Pardo RN  03/13/23 4:08 PM  [facility] Nurse Advisor    Reason for Disposition    Information only question and nurse able to answer    Additional Information    Negative: Nursing judgment    Negative: Nursing judgment    Negative: Nursing judgment    Negative: Nursing judgment    Protocols used: INFORMATION ONLY CALL - NO TRIAGE-A-OH

## 2023-03-13 NOTE — PROGRESS NOTES
Infusion Nursing Note:  Hilaria Bonner presents today for IVIG.    Patient seen by provider today: No   present during visit today: Not Applicable.    Note: N/A.    Intravenous Access:  Peripheral IV placed.    Treatment Conditions:  Not Applicable.    Post Infusion Assessment:  Patient tolerated infusion without incident.  Blood return noted pre and post infusion.  Site patent and intact, free from redness, edema or discomfort.  No evidence of extravasations.  Access discontinued per protocol.     Discharge Plan:   Patient declined prescription refills.  Discharge instructions reviewed with: Patient.  Patient verbalized understanding of discharge instructions and all questions answered.  AVS to patient via [employer].  Patient will return 3/31/23 for next appointment.   Patient discharged in stable condition accompanied by: self.  Departure Mode: Ambulatory with walker.      Piper Robledo RN

## 2023-03-17 ENCOUNTER — TELEPHONE (OUTPATIENT)
Dept: FAMILY MEDICINE | Facility: CLINIC | Age: 33
End: 2023-03-17

## 2023-03-17 ENCOUNTER — MEDICAL CORRESPONDENCE (OUTPATIENT)
Dept: HEALTH INFORMATION MANAGEMENT | Facility: CLINIC | Age: 33
End: 2023-03-17

## 2023-03-17 NOTE — TELEPHONE ENCOUNTER
Forms/Letter Request    Type of form/letter: [facility] forms need to be signed will place on providers desk for response.     Have you been seen for this request: N/A    Do we have the form/letter: Yes:     When is form/letter needed by: asap    How would you like the form/letter returned: Fax - [phone]

## 2023-03-17 NOTE — TELEPHONE ENCOUNTER
Forms/Letter Request    Type of form/letter: [facility] forms need to be signed will place on providers desk for response.     Have you been seen for this request: N/A    Do we have the form/letter: Yes:     When is form/letter needed by: asap    How would you like the form/letter returned: Fax number- [phone]

## 2023-03-17 NOTE — TELEPHONE ENCOUNTER
[facility] forms are signed by provider will be faxed to [phone]      Lola Wick Facilitator

## 2023-03-17 NOTE — TELEPHONE ENCOUNTER
[facility] Home health forms signed by provider will be faxed to [phone]      Lola Wick Facilitator

## 2023-03-20 ENCOUNTER — TELEPHONE (OUTPATIENT)
Dept: FAMILY MEDICINE | Facility: CLINIC | Age: 33
End: 2023-03-20

## 2023-03-20 NOTE — TELEPHONE ENCOUNTER
Forms/Letter Request    Type of form/letter: [facility] Health forms need to be signed placed on providers desk for response.     Have you been seen for this request: N/A    Do we have the form/letter: Yes:     When is form/letter needed by: asap    How would you like the form/letter returned: Fax number- [phone]

## 2023-03-21 ENCOUNTER — OFFICE VISIT (OUTPATIENT)
Dept: FAMILY MEDICINE | Facility: CLINIC | Age: 33
End: 2023-03-21
Payer: MEDICARE

## 2023-03-21 VITALS
BODY MASS INDEX: 39.55 KG/M2 | HEIGHT: 66 IN | OXYGEN SATURATION: 98 % | WEIGHT: 246.1 LBS | SYSTOLIC BLOOD PRESSURE: 117 MMHG | DIASTOLIC BLOOD PRESSURE: 82 MMHG | TEMPERATURE: 98.2 F | RESPIRATION RATE: 20 BRPM | HEART RATE: 62 BPM

## 2023-03-21 DIAGNOSIS — S06.9XAS COGNITIVE AND NEUROBEHAVIORAL DYSFUNCTION FOLLOWING BRAIN INJURY (H): ICD-10-CM

## 2023-03-21 DIAGNOSIS — F09 COGNITIVE AND NEUROBEHAVIORAL DYSFUNCTION FOLLOWING BRAIN INJURY (H): ICD-10-CM

## 2023-03-21 DIAGNOSIS — R63.4 UNINTENTIONAL WEIGHT LOSS: ICD-10-CM

## 2023-03-21 DIAGNOSIS — F10.96 WERNICKE-KORSAKOFF SYNDROME (ALCOHOLIC) (H): ICD-10-CM

## 2023-03-21 DIAGNOSIS — E11.65 TYPE 2 DIABETES MELLITUS WITH HYPERGLYCEMIA, WITHOUT LONG-TERM CURRENT USE OF INSULIN (H): Primary | ICD-10-CM

## 2023-03-21 DIAGNOSIS — Z13.220 SCREENING FOR HYPERLIPIDEMIA: ICD-10-CM

## 2023-03-21 DIAGNOSIS — G31.89 COGNITIVE AND NEUROBEHAVIORAL DYSFUNCTION FOLLOWING BRAIN INJURY (H): ICD-10-CM

## 2023-03-21 DIAGNOSIS — R32 URINARY INCONTINENCE, UNSPECIFIED TYPE: ICD-10-CM

## 2023-03-21 DIAGNOSIS — G61.81 CHRONIC INFLAMMATORY DEMYELINATING POLYNEUROPATHY (H): Chronic | ICD-10-CM

## 2023-03-21 LAB
ALBUMIN SERPL-MCNC: 3 G/DL (ref 3.4–5)
ALP SERPL-CCNC: 118 U/L (ref 40–150)
ALT SERPL W P-5'-P-CCNC: 12 U/L (ref 0–50)
ANION GAP SERPL CALCULATED.3IONS-SCNC: 5 MMOL/L (ref 3–14)
AST SERPL W P-5'-P-CCNC: 13 U/L (ref 0–45)
BILIRUB SERPL-MCNC: 0.4 MG/DL (ref 0.2–1.3)
BUN SERPL-MCNC: 8 MG/DL (ref 7–30)
CALCIUM SERPL-MCNC: 9.5 MG/DL (ref 8.5–10.1)
CHLORIDE BLD-SCNC: 105 MMOL/L (ref 94–109)
CHOLEST SERPL-MCNC: 211 MG/DL
CO2 SERPL-SCNC: 29 MMOL/L (ref 20–32)
CREAT SERPL-MCNC: 0.65 MG/DL (ref 0.52–1.04)
FASTING STATUS PATIENT QL REPORTED: ABNORMAL
GFR SERPL CREATININE-BSD FRML MDRD: >90 ML/MIN/1.73M2
GLUCOSE BLD-MCNC: 136 MG/DL (ref 70–99)
HDLC SERPL-MCNC: 50 MG/DL
LDLC SERPL CALC-MCNC: 129 MG/DL
NONHDLC SERPL-MCNC: 161 MG/DL
POTASSIUM BLD-SCNC: 4.1 MMOL/L (ref 3.4–5.3)
PROT SERPL-MCNC: 7.8 G/DL (ref 6.8–8.8)
SODIUM SERPL-SCNC: 139 MMOL/L (ref 133–144)
TRIGL SERPL-MCNC: 159 MG/DL

## 2023-03-21 PROCEDURE — 80061 LIPID PANEL: CPT | Performed by: PHYSICIAN ASSISTANT

## 2023-03-21 PROCEDURE — 99214 OFFICE O/P EST MOD 30 MIN: CPT | Performed by: PHYSICIAN ASSISTANT

## 2023-03-21 PROCEDURE — 80053 COMPREHEN METABOLIC PANEL: CPT | Performed by: PHYSICIAN ASSISTANT

## 2023-03-21 PROCEDURE — 36415 COLL VENOUS BLD VENIPUNCTURE: CPT | Performed by: PHYSICIAN ASSISTANT

## 2023-03-21 PROCEDURE — 84443 ASSAY THYROID STIM HORMONE: CPT | Performed by: PHYSICIAN ASSISTANT

## 2023-03-21 NOTE — PROGRESS NOTES
Assessment & Plan     Type 2 diabetes mellitus with hyperglycemia, without long-term current use of insulin (H)  Last A1C of 6.0 suggests diabetes well controlled.  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)  Unsure if has had neuropsych testing- I will check to see if this is what is planned on 5/1/23 at [facility]   [facility] brain 12/2/22 [facility] with moderate generalized cerebral and cerebellar atrophy    Urinary incontinence, unspecified type  Makes it to bathroom at times and at other times does not- ??etiology- unsure how much to attribute to above    Unintentional weight loss   Wt Readings from Last 4 Encounters:   03/21/23 111.6 kg (246 lb 1.6 oz)   03/13/23 115.2 kg (254 lb)   02/15/23 113.9 kg (251 lb 3.2 oz)   09/19/22 118.8 kg (262 lb)   unsure how much is related to above - added tsh       Review of prior external note(s) from - [facility] information from [facility] reviewed  60 minutes spent on the date of the encounter doing chart review, history and exam, documentation and further activities per the note       Patient Instructions   Bring in medication list   Follow up with us in 3 months   Continue working with neurology and wound care  Return urgently if any change in symptoms.        Return in about 3 months (around 6/21/2023), or if symptoms worsen or fail to improve, for in person.    Crissy Madrigal PA-C  [facility]  is a 32 year old, presenting for the following health issues:  Hypertension and Diabetes    Pt was also admitted on 11/27/2022 and transferred to [facility]. Note is in chart review    History of Present Illness       Reason for visit:  Follow up and get refills    She eats 4 or more servings of fruits and vegetables daily.She consumes 2 sweetened beverage(s) daily.She exercises with enough effort to increase her heart rate 20 to 29 minutes per day.  She exercises with enough effort to increase her heart rate 4 days per week.   She is taking medications regularly.          Hospital Follow-up Visit:    Hospital/Nursing Home/IP Rehab Facility: [facility]  Date of Admission: 12/20/2022  Date of Discharge: 01/11/2023  Reason(s) for Admission: Non-traumatic brain injury caused by wernicke-Korsakoff syndrome and debliity caused by Acute severe sepsis with lactic acidosis, acute respiratory insufficiency, and Acute community-acquired pneumonia        Was your hospitalization related to COVID-19? No   Problems taking medications regularly:  None  Medication changes since discharge: patient did not bring in medication list - so unsure what she is taking   Problems adhering to non-medication therapy:  Mom sets up pill box     Summary of hospitalization:    Diagnostic Tests/Treatments reviewed.  Follow up needed: neurology, wound care   Other Healthcare Providers Involved in Patient s Care:         Homecare and Specialist appointment -   followed by home care and wound care through [facility], ?neuropsych testing  Update since discharge: fluctuating course.         Plan of care communicated with patient and caregiver           Diabetes Follow-up      How often are you checking your blood sugar? Twice a day    What concerns do you have today about your diabetes? None     Do you have any of these symptoms? (Select all that apply)  Numbness in feet, Burning in feet, Excessive thirst and Blurry vision    Have you had a  diabetic eye exam in the last 12 months? No    Blood sugars usually in 80s - no lows.  Onetime high above 200            Hyperlipidemia Follow-Up      Are you regularly taking any medication or supplement to lower your cholesterol?   No    Are you having muscle aches or other side effects that you think could be caused by your cholesterol lowering medication?  No    Hypertension Follow-up      Do you check your blood pressure regularly outside of the clinic? Yes     Are you following a low salt diet? Yes    Are your blood pressures ever more than 140 on the top number (systolic) OR more   than 90 on the bottom number (diastolic), for example 140/90?   I don't know  BP Readings from Last 2 Encounters:   03/21/23 117/82   03/13/23 112/79     Hemoglobin A1C (%)   Date Value   01/24/2023 6.0 (H)   07/26/2022 5.4   03/02/2021 5.0   12/17/2020 5.7 (H)     LDL Cholesterol Calculated (mg/dL)   Date Value   03/21/2023 129 (H)   02/15/2022 82   12/17/2020 112 (H)   09/30/2020     Cannot estimate LDL when triglyceride exceeds 400 mg/dL     LDL Cholesterol Direct (mg/dL)   Date Value   09/30/2020 88     Patient well known to me currently living with mom who sets up her pills but did not bring in med list so I have no idea what she is taking.  Complains of numbness of Hands and feet.  Constant and bothersome for many yrs- diagnosed with chronic inflammatory demyelinating polyneuropathy post covid and gets infusions.  Complains of memory problems since hospitalization.  Diagnosed with Wernicke-Korsakoff syndrome alcoholic-now sober. History of type 2 diabetes with A1C 6.0 one month ago.  Liver enzymes were high a month ago but now normal today.  Has not followed with [facility].  Next appointment on April 17 for wound care and neurology 3/23/23.   Main concern for me today was follow up after hospitalization last December 3 months ago.    Concerns for me today are her memory and numbness in hands and feet which are explained by  "above  For longer distances patient uses wheelchair  Reports incontinence of stool and urine- unsure if can't make it to bathroom soon enough or just loses control of urine due to urgency  Is followed by psychiatry.    No dysuria  Overdue for diabetes eye exam  Abnormal MRI with moderate generalized cerebral and cerebellar atrophy-12/2/22 viewable through [facility]where [facility]- looks like has appointment for neuropsych testing through [facility] 5/1/23 but patient and mother unsure if she has had any neuropsych testing      Review of Systems   CONSTITUTIONAL:has had some weight loss unintential-no longer following with gastroenterologist - has not seen [facility]  Wt Readings from Last 4 Encounters:   03/21/23 111.6 kg (246 lb 1.6 oz)   03/13/23 115.2 kg (254 lb)   02/15/23 113.9 kg (251 lb 3.2 oz)   09/19/22 118.8 kg (262 lb)   EYES: blurred vision bilaterally overdue for eye exam  RESP:NEGATIVE for significant cough or SOB  CV: NEGATIVE for chest pain, palpitations or peripheral edema  GI: POSITIVE for stool incontinence  Positive for urinary incontinence  NEURO: POSITIVE for memory problems, bilateral upper and lower extremites numbness and paresthesias bilaterally and weakness requiring walker to ambulate        Objective    /82 (BP Location: Right arm, Patient Position: Sitting, Cuff Size: Adult Large)   Pulse 62   Temp 98.2  F (36.8  C)   Resp 20   Ht 1.676 m (5' 6\")   Wt 111.6 kg (246 lb 1.6 oz)   SpO2 98%   BMI 39.72 kg/m    There is no height or weight on file to calculate BMI.  Physical Exam   GENERAL: alert, no distress and obese- pleasant, conversational, doesn't offer much for history   EYES: Eyes grossly normal to inspection, PERRL and conjunctivae and sclerae normal  NECK: no adenopathy, no asymmetry, masses, or scars and thyroid normal to palpation  RESP: lungs clear to auscultation - no rales, rhonchi or wheezes  CV: regular rate and rhythm, normal S1 S2, no S3 or S4, no murmur, click or rub, no " peripheral edema and peripheral pulses strong  ABDOMEN: soft, nontender, no organomegaly or masses and no palpable or pulsatile masses  MS: ambulates with walker, requires assistance to get up and off exam table       Results for orders placed or performed in visit on 03/21/23 (from the past 24 hour(s))   Comprehensive metabolic panel   Result Value Ref Range    Sodium 139 133 - 144 mmol/L    Potassium 4.1 3.4 - 5.3 mmol/L    Chloride 105 94 - 109 mmol/L    Carbon Dioxide (CO2) 29 20 - 32 mmol/L    Anion Gap 5 3 - 14 mmol/L    Urea Nitrogen 8 7 - 30 mg/dL    Creatinine 0.65 0.52 - 1.04 mg/dL    Calcium 9.5 8.5 - 10.1 mg/dL    Glucose 136 (H) 70 - 99 mg/dL    Alkaline Phosphatase 118 40 - 150 U/L    AST 13 0 - 45 U/L    ALT 12 0 - 50 U/L    Protein Total 7.8 6.8 - 8.8 g/dL    Albumin 3.0 (L) 3.4 - 5.0 g/dL    Bilirubin Total 0.4 0.2 - 1.3 mg/dL    GFR Estimate >90 >60 mL/min/1.73m2   Lipid panel reflex to direct LDL Fasting   Result Value Ref Range    Cholesterol 211 (H) <200 mg/dL    Triglycerides 159 (H) <150 mg/dL    Direct Measure HDL 50 >=50 mg/dL    LDL Cholesterol Calculated 129 (H) <=100 mg/dL    Non HDL Cholesterol 161 (H) <130 mg/dL    Patient Fasting > 8hrs? Unknown     Narrative    Cholesterol  Desirable:  <200 mg/dL    Triglycerides  Normal:  Less than 150 mg/dL  Borderline High:  150-199 mg/dL  High:  200-499 mg/dL  Very High:  Greater than or equal to 500 mg/dL    Direct Measure HDL  Female:  Greater than or equal to 50 mg/dL   Male:  Greater than or equal to 40 mg/dL    LDL Cholesterol  Desirable:  <100mg/dL  Above Desirable:  100-129 mg/dL   Borderline High:  130-159 mg/dL   High:  160-189 mg/dL   Very High:  >= 190 mg/dL    Non HDL Cholesterol  Desirable:  130 mg/dL  Above Desirable:  130-159 mg/dL  Borderline High:  160-189 mg/dL  High:  190-219 mg/dL  Very High:  Greater than or equal to 220 mg/dL     *Note: Due to a large number of results and/or encounters for the requested time period, some  results have not been displayed. A complete set of results can be found in Results Review.

## 2023-03-22 ENCOUNTER — TELEPHONE (OUTPATIENT)
Dept: FAMILY MEDICINE | Facility: CLINIC | Age: 33
End: 2023-03-22

## 2023-03-22 LAB — TSH SERPL DL<=0.005 MIU/L-ACNC: 1.2 MU/L (ref 0.4–4)

## 2023-03-22 NOTE — RESULT ENCOUNTER NOTE
See phone note - also sent MyChart results    Dear Hilaria  Your thyroid function was normal.   Your cholesterol was a little high.  Poor diet, genetics and being overweight can contribute to this.  Maintaining a healthy diet with lean proteins, whole grains and healthy fats such as olive oil as well as regular exercise and maintaining an appropriate weight all contribute to healthier cholesterol levels.    Your electrolytes, kidney function and liver function were normal.  I will have someone reach out to you to get you in to see me in the next month to see how you are doing.  Please bring in a list of the medications you are taking and arrive 20 minutes early so we can get a urine test.  Please call or MyChart my office with any questions or concerns.   Crissy Madrigal, PAC

## 2023-03-22 NOTE — TELEPHONE ENCOUNTER
Please call [facility] and find out if appointment on 5/1/23 is for neuropsych testing.    Please call patient and arrange for face to face visit with me within the next month- arrive 20 minutes early and leave a urine sample   Concerned about weight loss and wounds and incontinence issues

## 2023-03-23 ENCOUNTER — TELEPHONE (OUTPATIENT)
Dept: NEUROLOGY | Facility: CLINIC | Age: 33
End: 2023-03-23

## 2023-03-23 ENCOUNTER — VIRTUAL VISIT (OUTPATIENT)
Dept: NEUROLOGY | Facility: CLINIC | Age: 33
End: 2023-03-23
Payer: MEDICARE

## 2023-03-23 DIAGNOSIS — F06.30 MOOD DISORDER IN CONDITIONS CLASSIFIED ELSEWHERE: ICD-10-CM

## 2023-03-23 DIAGNOSIS — F29 PSYCHOSIS, UNSPECIFIED PSYCHOSIS TYPE (H): ICD-10-CM

## 2023-03-23 DIAGNOSIS — F41.1 GAD (GENERALIZED ANXIETY DISORDER): Chronic | ICD-10-CM

## 2023-03-23 PROCEDURE — 99214 OFFICE O/P EST MOD 30 MIN: CPT | Mod: VID | Performed by: PSYCHIATRY & NEUROLOGY

## 2023-03-23 RX ORDER — OLANZAPINE 10 MG/1
10 TABLET ORAL AT BEDTIME
Qty: 30 TABLET | Refills: 3 | Status: SHIPPED | OUTPATIENT
Start: 2023-03-23 | End: 2023-05-25

## 2023-03-23 RX ORDER — OLANZAPINE 2.5 MG/1
2.5 TABLET, FILM COATED ORAL 2 TIMES DAILY PRN
Qty: 60 TABLET | Refills: 1 | Status: SHIPPED | OUTPATIENT
Start: 2023-03-23 | End: 2023-05-25

## 2023-03-23 RX ORDER — TRAZODONE HYDROCHLORIDE 50 MG/1
50 TABLET, FILM COATED ORAL
Qty: 30 TABLET | Refills: 4 | Status: SHIPPED | OUTPATIENT
Start: 2023-03-23 | End: 2023-05-25

## 2023-03-23 RX ORDER — VENLAFAXINE HYDROCHLORIDE 150 MG/1
150 CAPSULE, EXTENDED RELEASE ORAL DAILY
Qty: 90 CAPSULE | Refills: 1 | Status: SHIPPED | OUTPATIENT
Start: 2023-03-23 | End: 2023-05-25

## 2023-03-23 RX ORDER — VENLAFAXINE HYDROCHLORIDE 75 MG/1
75 TABLET, EXTENDED RELEASE ORAL DAILY
Qty: 30 TABLET | Refills: 3 | Status: SHIPPED | OUTPATIENT
Start: 2023-03-23

## 2023-03-23 ASSESSMENT — PATIENT HEALTH QUESTIONNAIRE - PHQ9: SUM OF ALL RESPONSES TO PHQ QUESTIONS 1-9: 23

## 2023-03-23 NOTE — NURSING NOTE
Is the patient currently in the state of [place]? YES    Visit mode:TELEPHONE    If the visit is dropped, the patient can be reconnected by: VIDEO VISIT: Text to cell phone: [phone]    Will anyone else be joining the visit? NO      How would you like to obtain your AVS? MyChart    Are changes needed to the allergy or medication list? NO changes to medications since last reviewed by clinic staff two days ago.     Reason for visit: follow-up
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
## 2023-03-23 NOTE — PATIENT INSTRUCTIONS
I am going to increase the patient's Effexor to 225 mg a day.  I encouraged them to utilize the as needed Zyprexa if needed.  She should return to this clinic in 2 to 3 months for medication check and agrees to contact us before then with any questions or concerns.  She should continue to follow up with her medical team.
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
## 2023-03-24 NOTE — TELEPHONE ENCOUNTER
Called and spoke with pt today and she is scheduled 4/17 with pcp. I did call and confirm with Linda that the appt on 5/1 is for neuropsychic testing. Will route to PCP as renny Ríos CMA

## 2023-03-27 ENCOUNTER — TELEPHONE (OUTPATIENT)
Dept: FAMILY MEDICINE | Facility: CLINIC | Age: 33
End: 2023-03-27

## 2023-03-27 DIAGNOSIS — R41.3 MEMORY LOSS: ICD-10-CM

## 2023-03-27 DIAGNOSIS — F10.96 WERNICKE-KORSAKOFF SYNDROME (ALCOHOLIC) (H): Primary | ICD-10-CM

## 2023-03-27 RX ORDER — METHYLPREDNISOLONE SODIUM SUCCINATE 40 MG/ML
20 INJECTION, POWDER, LYOPHILIZED, FOR SOLUTION INTRAMUSCULAR; INTRAVENOUS ONCE
Status: CANCELLED
Start: 2023-03-27 | End: 2023-03-27

## 2023-03-27 NOTE — TELEPHONE ENCOUNTER
Please call patient and advise that neuropsychology testing on may 1 with Linda should give us more information.  Schedule follow up with general neurology - referral placed. May take many months to get in but I would like an appointment on the books

## 2023-03-28 ENCOUNTER — MEDICAL CORRESPONDENCE (OUTPATIENT)
Dept: HEALTH INFORMATION MANAGEMENT | Facility: CLINIC | Age: 33
End: 2023-03-28

## 2023-03-28 ENCOUNTER — TELEPHONE (OUTPATIENT)
Dept: FAMILY MEDICINE | Facility: CLINIC | Age: 33
End: 2023-03-28

## 2023-03-28 NOTE — TELEPHONE ENCOUNTER
Forms/Letter Request    Type of form/letter: [facility] Order [id number]    Have you been seen for this request: N/A    Do we have the form/letter: Yes: will place on provider's desk for review.      When is form/letter needed by: asap      How would you like the form/letter returned: Fax  [phone]

## 2023-03-28 NOTE — TELEPHONE ENCOUNTER
[facility] forms signed by provider will fax to [phone]      Lola TORRES Visit Facilitator

## 2023-03-28 NOTE — TELEPHONE ENCOUNTER
Called Pt regarding note below pt will go ahead and schedule Apt for neurology.     Lola TORRES Visit Facilitator

## 2023-04-10 ENCOUNTER — TELEPHONE (OUTPATIENT)
Dept: FAMILY MEDICINE | Facility: CLINIC | Age: 33
End: 2023-04-10
Payer: MEDICARE

## 2023-04-10 NOTE — TELEPHONE ENCOUNTER
Forms/Letter Request    Type of form/letter: [facility] Plan of Care [id number]    Have you been seen for this request: N/A    Do we have the form/letter: Yes: Will place order/request on providers desk for review/signature.     When is form/letter needed by: asap     How would you like the form/letter returned: Fax : [phone]

## 2023-04-11 ENCOUNTER — VIRTUAL VISIT (OUTPATIENT)
Dept: PSYCHOLOGY | Facility: CLINIC | Age: 33
End: 2023-04-11
Payer: MEDICARE

## 2023-04-11 DIAGNOSIS — F33.1 MDD (MAJOR DEPRESSIVE DISORDER), RECURRENT EPISODE, MODERATE (H): Primary | ICD-10-CM

## 2023-04-11 PROCEDURE — 90837 PSYTX W PT 60 MINUTES: CPT | Mod: VID | Performed by: SOCIAL WORKER

## 2023-04-11 ASSESSMENT — ANXIETY QUESTIONNAIRES
IF YOU CHECKED OFF ANY PROBLEMS ON THIS QUESTIONNAIRE, HOW DIFFICULT HAVE THESE PROBLEMS MADE IT FOR YOU TO DO YOUR WORK, TAKE CARE OF THINGS AT HOME, OR GET ALONG WITH OTHER PEOPLE: VERY DIFFICULT
7. FEELING AFRAID AS IF SOMETHING AWFUL MIGHT HAPPEN: NEARLY EVERY DAY
GAD7 TOTAL SCORE: 21
2. NOT BEING ABLE TO STOP OR CONTROL WORRYING: NEARLY EVERY DAY
2. NOT BEING ABLE TO STOP OR CONTROL WORRYING: NEARLY EVERY DAY
GAD7 TOTAL SCORE: 21
8. IF YOU CHECKED OFF ANY PROBLEMS, HOW DIFFICULT HAVE THESE MADE IT FOR YOU TO DO YOUR WORK, TAKE CARE OF THINGS AT HOME, OR GET ALONG WITH OTHER PEOPLE?: VERY DIFFICULT
GAD7 TOTAL SCORE: 21
IF YOU CHECKED OFF ANY PROBLEMS ON THIS QUESTIONNAIRE, HOW DIFFICULT HAVE THESE PROBLEMS MADE IT FOR YOU TO DO YOUR WORK, TAKE CARE OF THINGS AT HOME, OR GET ALONG WITH OTHER PEOPLE: VERY DIFFICULT
3. WORRYING TOO MUCH ABOUT DIFFERENT THINGS: NEARLY EVERY DAY
4. TROUBLE RELAXING: NEARLY EVERY DAY
6. BECOMING EASILY ANNOYED OR IRRITABLE: NEARLY EVERY DAY
3. WORRYING TOO MUCH ABOUT DIFFERENT THINGS: NEARLY EVERY DAY
6. BECOMING EASILY ANNOYED OR IRRITABLE: NEARLY EVERY DAY
1. FEELING NERVOUS, ANXIOUS, OR ON EDGE: NEARLY EVERY DAY
GAD7 TOTAL SCORE: 21
7. FEELING AFRAID AS IF SOMETHING AWFUL MIGHT HAPPEN: NEARLY EVERY DAY
GAD7 TOTAL SCORE: 21
1. FEELING NERVOUS, ANXIOUS, OR ON EDGE: NEARLY EVERY DAY
5. BEING SO RESTLESS THAT IT IS HARD TO SIT STILL: NEARLY EVERY DAY
4. TROUBLE RELAXING: NEARLY EVERY DAY
5. BEING SO RESTLESS THAT IT IS HARD TO SIT STILL: NEARLY EVERY DAY
7. FEELING AFRAID AS IF SOMETHING AWFUL MIGHT HAPPEN: NEARLY EVERY DAY

## 2023-04-11 ASSESSMENT — PATIENT HEALTH QUESTIONNAIRE - PHQ9
10. IF YOU CHECKED OFF ANY PROBLEMS, HOW DIFFICULT HAVE THESE PROBLEMS MADE IT FOR YOU TO DO YOUR WORK, TAKE CARE OF THINGS AT HOME, OR GET ALONG WITH OTHER PEOPLE: SOMEWHAT DIFFICULT
SUM OF ALL RESPONSES TO PHQ QUESTIONS 1-9: 9
10. IF YOU CHECKED OFF ANY PROBLEMS, HOW DIFFICULT HAVE THESE PROBLEMS MADE IT FOR YOU TO DO YOUR WORK, TAKE CARE OF THINGS AT HOME, OR GET ALONG WITH OTHER PEOPLE: SOMEWHAT DIFFICULT

## 2023-04-11 NOTE — PROGRESS NOTES
[facility]                                     Progress Note    Patient Name: Hilaria Bonner  Date: 2023         Service Type: Individual      Session Start Time:12:02 Session End Time: 12:57     Session Length: 55    Session #:5    Attendees: Client    Service Modality:  Video Visit:      Provider verified identity through the following two step process.  Patient provided:  Patient photo, Patient  and Patient address    Telemedicine Visit: The patient's condition can be safely assessed and treated via synchronous audio and visual telemedicine encounter.      Reason for Telemedicine Visit: Services only offered telehealth    Originating Site (Patient Location): Patient's home    Distant Site (Provider Location): Provider Remote Setting    Consent:  The patient/guardian has verbally consented to: the potential risks and benefits of telemedicine (video visit) versus in person care; bill my insurance or make self-payment for services provided; and responsibility for payment of non-covered services.     Patient would like the video invitation sent by:  My Chart    Mode of Communication:  Video Conference via Nomanini was used after mychart failed.     Distant Location (Provider):  Off-site    As the provider I attest to compliance with applicable laws and regulations related to telemedicine.    DATA  Interactive Complexity: Yes, visit entailed Interactive Complexity evidenced by:  Speech problem. Memory issue. Needed more time to produce her words.  While in session, a call was made to [employer] on behalf of the patient. RYLIE in chart    Crisis: No      Progress Since Last Session (Related to Symptoms / Goals / Homework):   Symptoms: Has been sick. was last seen in October    Homework: Did not complete      Episode of Care Goals: Minimal progress - ACTION (Actively working towards change); Intervened by reinforcing change plan / affirming steps taken- was last seen on  January 31st.     Current / Ongoing Stressors and Concerns: Patient reports having been very ill. Ended up having an open wound on the hip due to laying on the same side for for so long. She and kids are still living with parents who are helping out. The nurse comes in for her wound care. She is still getting a speech therapy but she notes little progress due to the severity of her condition.  Also sees an OT.  Though no follow up with the referral for Mh  with [facility]. Writer had done the necessary but none had a follow up. Agreed with patient that it was time to call the [facility] front door. Message was left in Crissy's phone about patient's case management services referral made early in January.  Patient might be eligible for CAD services but will need a CM to assist in the process. Patient had none to assist her to find out. Her next visit is on  4/26/2023.     Treatment Objective(s) Addressed in This Session:   use thought-stopping strategy daily to reduce intrusive thoughts  Identify negative self-talk and behaviors: challenge core beliefs, myths, and actions  increase understanding of steps in the grief process  PTSD: process past traumatic events     Intervention:     Situation        Automatic Thoughts  Cognitive Distortions      Feelings        Behavior        Questioning Thoughts          Motivational Interviewing    MI Intervention: Expressed Empathy/Understanding, Supported Autonomy, Collaboration, Evocation, Permission to raise concern or advise and Open-ended questions     Change Talk Expressed by the Patient: Taking steps    Provider Response to Change Talk: E - Evoked more info from patient about behavior change, A - Affirmed patient's thoughts, decisions, or attempts at behavior change, R - Reflected patient's change talk and S - Summarized patient's change talk statements    Assessments completed prior to visit:7/25/2022    The following assessments were completed by  patient for this visit:  PHQ2:       4/11/2023     9:31 AM 3/23/2023    12:37 PM 7/26/2022    10:18 AM 5/13/2022     2:41 PM 2/15/2022    10:36 AM 11/16/2021     9:49 AM 8/13/2021     1:01 PM   PHQ-2 ( 1999 [employer])   Q1: Little interest or pleasure in doing things  3 3   1 3   Q2: Feeling down, depressed or hopeless  3 3   1 3   PHQ-2 Score  6 6   2 6   PHQ-2 Total Score (12-17 Years)- Positive if 3 or more points; Administer PHQ-A if positive       6   Q1: Little interest or pleasure in doing things   Nearly every day   Several days Nearly every day   Q2: Feeling down, depressed or hopeless   Nearly every day   Several days Nearly every day   PHQ-2 Score Incomplete  6 Incomplete    Incomplete Incomplete    Incomplete 2 6     PHQ9:       7/21/2022     8:57 AM 7/26/2022    10:13 AM 9/19/2022     8:26 AM 1/24/2023    11:39 AM 1/31/2023     3:27 PM 3/23/2023    12:37 PM 4/11/2023     9:31 AM   PHQ-9 SCORE   PHQ-9 Total Score MyChart  22 (Severe depression) 24 (Severe depression) 8 (Mild depression)   9 (Mild depression)   PHQ-9 Total Score 22 22 24 8 16 23 9    9     GAD2:       7/25/2022     8:58 AM 4/11/2023    11:48 AM   MELODY-2   Feeling nervous, anxious, or on edge 3    3 1   Not being able to stop or control worrying 3    3 1   MELODY-2 Total Score 6    6 2     GAD7:       2/15/2022    10:28 AM 5/13/2022     2:41 PM 7/21/2022     8:57 AM 7/25/2022     8:58 AM 9/19/2022     8:27 AM 1/31/2023     3:27 PM 4/11/2023     9:31 AM   MELODY-7 SCORE   Total Score 9 (mild anxiety) 6 (mild anxiety)  18 (severe anxiety) 19 (severe anxiety)  21 (severe anxiety)   Total Score 9 6 18 18    18 19 17 21    21     CAGE-AID:       6/1/2018    10:34 AM 7/21/2022     8:57 AM 1/31/2023     4:08 PM   CAGE-AID Total Score   Total Score 0 1 0   Total Score MyChart 0 (A total score of 2 or greater is considered clinically significant)       PROMIS 10-Global Health (all questions and answers displayed):       7/21/2022     8:57 AM 9/19/2022      8:30 AM 1/31/2023     4:08 PM   PROMIS 10   In general, would you say your health is:  Fair    In general, would you say your quality of life is:  Poor    In general, how would you rate your physical health?  Poor    In general, how would you rate your mental health, including your mood and your ability to think?  Poor    In general, how would you rate your satisfaction with your social activities and relationships?  Poor    In general, please rate how well you carry out your usual social activities and roles  Poor    To what extent are you able to carry out your everyday physical activities such as walking, climbing stairs, carrying groceries, or moving a chair?  A little    In the past 7 days, how often have you been bothered by emotional problems such as feeling anxious, depressed, or irritable?  Always    In the past 7 days, how would you rate your fatigue on average?  Very severe    In the past 7 days, how would you rate your pain on average, where 0 means no pain, and 10 means worst imaginable pain?  8    In general, would you say your health is: 2 2 2   In general, would you say your quality of life is: 2 1 2   In general, how would you rate your physical health? 1 1 2   In general, how would you rate your mental health, including your mood and your ability to think? 1 1 3   In general, how would you rate your satisfaction with your social activities and relationships? 1 1 2   In general, please rate how well you carry out your usual social activities and roles. (This includes activities at home, at work and in your community, and responsibilities as a parent, child, spouse, employee, friend, etc.) 3 1 2   To what extent are you able to carry out your everyday physical activities such as walking, climbing stairs, carrying groceries, or moving a chair? 2 2 2   In the past 7 days, how often have you been bothered by emotional problems such as feeling anxious, depressed, or irritable? 5 5 4   In the past 7 days,  how would you rate your fatigue on average? 2 5 4   In the past 7 days, how would you rate your pain on average, where 0 means no pain, and 10 means worst imaginable pain? 9 8 6   Global Mental Health Score 5 4 9   Global Physical Health Score 9 6 9   PROMIS TOTAL - SUBSCORES 14 10 18     [place] Suicide Severity Rating Scale (Lifetime/Recent)      3/26/2019     2:21 PM   [place] Suicide Severity Rating (Lifetime/Recent)   Q2 Suicidal Thoughts (Past Month) no   Q6 Suicide Behavior (Lifetime) no     [place] Suicide Severity Rating Scale (Short Version)      6/5/2020     5:00 PM 10/22/2020     4:57 PM 3/22/2021     2:14 PM 7/21/2022     9:15 AM 7/26/2022     1:25 PM 7/28/2022    12:36 PM 1/31/2023     4:08 PM   [place] Suicide Severity Rating (Short Version)   Over the past 2 weeks have you felt down, depressed, or hopeless? yes yes yes  no no    Over the past 2 weeks have you had thoughts of killing yourself? no no no  no no    Have you ever attempted to kill yourself? no no no  no no    1. Wish to be Dead (Since Last Contact)    N   N   2. Non-Specific Active Suicidal Thoughts (Since Last Contact)    N   N   Actual Attempt (Since Last Contact)    N   N   Has subject engaged in non-suicidal self-injurious behavior? (Since Last Contact)       N   Interrupted Attempts (Since Last Contact)    N   N   Aborted or Self-Interrupted Attempt (Since Last Contact)    N   N   Preparatory Acts or Behavior (Since Last Contact)    N      Suicide (Since Last Contact)    N   N   Calculated C-SSRS Risk Score (Since Last Contact)    No Risk Indicated   No Risk Indicated       ASSESSMENT: Current Emotional / Mental Status (status of significant symptoms):   Risk status (Self / Other harm or suicidal ideation)   Patient denies current fears or concerns for personal safety.   Patient denies current or recent suicidal ideation or behaviors.   Patient denies current or recent homicidal ideation or behaviors.   Patient denies current or  recent self injurious behavior or ideation.   Patient denies other safety concerns.   Patient reports there has been no change in risk factors since their last session.     Patient reports there has been no change in protective factors since their last session.     Recommended that patient call 911 or go to the local ED should there be a change in any of these risk factors.     Appearance:   Appropriate    Eye Contact:   Good    Psychomotor Behavior: Normal    Attitude:   Cooperative    Orientation:   Person Place Time Situation   Speech    Rate / Production: Normal/ Responsive Normal     Volume:  Normal    Mood:    Anxious  Depressed    Affect:    Appropriate    Thought Content:  Clear    Thought Form:  Coherent  Logical    Insight:    Good      Medication Review:   No changes to current psychiatric medication(s)     Medication Compliance:   Yes     Changes in Health Issues:   None reported     Chemical Use Review:   Substance Use: Chemical use reviewed, no active concerns identified      Tobacco Use: No change in amount of tobacco use since last session.  Contemplation    Diagnosis:  1. MDD (major depressive disorder), recurrent episode, moderate (H)      Collateral Reports Completed:   Routed note to PCP    PLAN: (Patient Tasks / Therapist Tasks / Other): will keep these goals  Patient will talk to her family when feeling sad.   Patient will stay in touch with her providers  Patient will increase positive self talk when feeling sad.   Patient will practice meditation to bring her thoughts to here.  Patient will reflect on today's discussion around her grief   Patient will try some mindfully exercise in my chart.   Patient will keep her goal to do accomplish one thing a day.   Patient's next visit is in 2 weeks..    JESSICA Reagan  ___________________________________________________________________  Individual Treatment Plan    Patient's Name: Hilaria Bonner  YOB: 1990    Date of  "Creation: 9/26/2022    Date Treatment Plan Last Reviewed/Revised: 1/31/2023    DSM5 Diagnoses: 296.32 (F33.1) Major Depressive Disorder, Recurrent Episode, Moderate _ and With anxious distress, 300.02 (F41.1) Generalized Anxiety Disorder or Adjustment Disorders  309.28 (F43.23) With mixed anxiety and depressed mood ;Grief reaction [F43.21]    Psychosocial / Contextual Factors:ongoing depression, anxiety, recent loss of long term partner , the father of her 2 children, strong history of child trauma, ongoing medical issues, history of trauma     PROMIS (reviewed every 90 days): 10    Referral / Collaboration:  Referral to another professional/service is not indicated at this time.     Anticipated number of session for this episode of care: 9-12 sessions  Anticipation frequency of session: Biweekly  Anticipated Duration of each session: 53 or more minutes  Treatment plan will be reviewed in 90 days or when goals have been changed.       MeasurableTreatment Goal(s) related to diagnosis / functional impairment(s)  Goal 1: Patient will develop health cognitive patterns and beliefs about self and the world that lead to alleviation and help prevent the relapse of depression symptoms.     I will know I've met my goal when the level of my depression is reduced from 4/4 to 3/4 or better\"- no  Change as of 1/31/2023     Objective #A (Patient Action)                          Patient will Increase interest, engagement, and pleasure in doing things  Decrease frequency and intensity of feeling down, depressed, hopeless  Identify negative self-talk and behaviors: challenge core beliefs, myths, and actions  Improve concentration, focus, and mindfulness in daily activities .  Status: Continued - Date(s): 1/31/2023  Intervention(s)  Therapist will provide provide space to express feelings and thoughts.     Objective #B  Patient will Improve quantity and quality of night time sleep / decrease daytime naps  Feel less tired and more " "energy during the day   Improve concentration, focus, and mindfulness in daily activities .  Status: Continued - Date(s): 1/31/2023     Intervention(s)  Therapist will assign homework : review and practice CBT skills.     Objective #C  Patient will identify at least 4 fears / thoughts that contribute to feeling anxious.  Status: Continued - Date(s): 1/31/2023  Intervention(s)  Therapist will teach distraction skills.  : self soothe using the 4 senses.     Goal 2: Patient will stabilize anxiety level while increasing ability to function on a daily basis     I will know I've met my goal when when the level of my anxiety is reduced from 4/4 to 3/4 or better by next review.       Objective #A (Patient Action)                          Status: Continued - Date(s): 1/31/2023  Patient will use relaxation strategies 4 times per day to reduce the physical symptoms of anxiety.   Intervention(s)  Therapist will teach emotional recognition/identification. : express feelings as they present for support.     Objective #B  Patient will use thought-stopping strategy daily to reduce intrusive thoughts.                        Status: Continued - Date(s): 1/31/2023  Intervention(s)  Therapist will practice CBT/DBT/ACT skills. .     Objective #C  Patient will make a list of pros and cons for tolerating and not tolerating an urge to harm self.  Status: Continued - Date(s): 1/31/2023     Intervention(s)  Therapist will  assess for safety and reinforce safety plan    Goal 3: Patient will display an understanding of the grief process and how this process may be exacerbated by or may exacerbate mental illness symptoms.    I will know I've met my goal when I am able to express unresolved emotions regarding my loss by the next review\"     Objective #A (Patient Action)                          Patient will identify how the use of substances contributes to the avoidance of feeling associated with the loss.  Status: Continued - " Date(s):1/31/2023  Intervention(s)  Therapist will provide space and time to process grief.     Goal 3: Patient will develop an understanding of how avoidance of the grief process may affect functioning in all areas of functioning.   I will know I've met my goal when I have developed alternative diversions and other coping mechanisms that are related to loss issues by the next review       Objective #A (Patient Action)                          Status: Continued - Date(s): 1/31/2023  Patient will talk to at least two others about losses and coping.  Intervention(s)  Therapist will provide some education on how to safely share feelings of loss with others.    Patient has reviewed and agreed to the above plan  .    JESSICA Reagan  January 31/2023

## 2023-04-12 ENCOUNTER — TELEPHONE (OUTPATIENT)
Dept: FAMILY MEDICINE | Facility: CLINIC | Age: 33
End: 2023-04-12
Payer: MEDICARE

## 2023-04-12 NOTE — TELEPHONE ENCOUNTER
Arrange for phone visit with home care .  When I last saw the patient with her mother- she did not have a list and had no idea what patient was taking   Has a face to face visit with me 4/18/23- hopefully she can keep that appointment and bring in what she is taking

## 2023-04-12 NOTE — TELEPHONE ENCOUNTER
Provider:  Please see message below.  Would you like a telephone visit with Lola if that can be arranged to get the medication list cleaned up. Last appointment with you was on 3/21/2023. Thank you. Desire Mancini R.N.    Lola reports that Rafaela's mom was managing her medications.  He mom is now in the hospital and she can no longer manage her medications. Her aunt is able to manage her medications from here on out for this time, but home care needs a new cleaned up medication list.    There are many duplicates on her medication list.  I was unable to do a medication reconciliation accurately. Lola reports that she comes 3 days a week she can help get the medication list straight if the provider wants to do a telephone visit while she is there if that can be arrange.  She communicates that Rafaela cannot accurately communicate what she is taking.      Ok to leave a message with the provider's response.  They have at home  Calcium 500 mg chewable tablet with D. - from hospital or TCU  Ferrous Sulfatre 325 mg - every other day - from TCU    Should she be on   Lasix - see it on hospital discharge - they do not have the bottle.    Potassium - see it on hospital discharge - they do not have the bottle.

## 2023-04-13 NOTE — TELEPHONE ENCOUNTER
RN left voicemail for Lola CAAL to contact clinic to arrange a phone visit/appointment when she is visiting patient, per provider's recommendation. If Lola CAAL calls back, help schedule a telephone visit when she will be visiting patient and can help get her medication list straight.     Mohit Hancock, JONATHANN, RN, PHN  [facility]

## 2023-04-14 ENCOUNTER — MYC MEDICAL ADVICE (OUTPATIENT)
Dept: FAMILY MEDICINE | Facility: CLINIC | Age: 33
End: 2023-04-14
Payer: MEDICARE

## 2023-04-14 ENCOUNTER — TELEPHONE (OUTPATIENT)
Dept: FAMILY MEDICINE | Facility: CLINIC | Age: 33
End: 2023-04-14
Payer: MEDICARE

## 2023-04-14 NOTE — TELEPHONE ENCOUNTER
"Patient is returning a call for an advise for her swollen knee.  \"I can't get up.\"    Writer advised patient to go to urgent care.  Patient agreed with the plan.    Summer Terrazas RN  [facility]    "

## 2023-04-14 NOTE — TELEPHONE ENCOUNTER
I have no idea if med list is correct. At last appointment patient and mother did not know what she was taking and didn't bring in any list with them- please make sure that patient brings in med list with her and/or bottles she is taking to appointment on 4/18

## 2023-04-14 NOTE — TELEPHONE ENCOUNTER
RN called TEN Davila Case Manager, to arrange telephone appointment with provider for medication reconciliation.    Lola states that she did call earlier (see also telephone encounter 4/14/2023) regarding right knee swelling. Patient will be going to  today for further evaluation.    Patient has appointment with PCP on 4/18/2023 to go over medications. Once med list has been updated from that visit, requesting updated med list to be faxed to Lola. Lola is okay with not scheduling telephone appointment with PCP as long as the updated med list is correct and she can go off from that.     Please fax med list to [phone]. Attn: Lola Jeffery.    Lola states that patient's aunt will make sure patient makes it to her appointment next week.    Routing to provider as an update.    Dorothy Nash RN  [facility]

## 2023-04-14 NOTE — TELEPHONE ENCOUNTER
Forms/Letter Request    Type of form/letter: [facility]     Have you been seen for this request: N/A    Do we have the form/letter: Yes: Will place form on providers desk for review/signature    When is form/letter needed by: asap    How would you like the form/letter returned: Fax : [phone]

## 2023-04-14 NOTE — TELEPHONE ENCOUNTER
"TEN Davila from [facility] is calling to set up an in-person visit for a new condition.    Lola is reporting patient's right knee swelling.  \"Patient's right knee is three times as her left knee and it is painful.  It is red, she had this condition before.\"    No face-to-face openings today.  Advised patient be seen at urgent care.  Patient agreed with a plan.    Summer Terrazas RN  [facility]    "

## 2023-04-14 NOTE — TELEPHONE ENCOUNTER
Patient returning a call.    Seeder message read to patient.    Summer Terrazas RN  [facility]

## 2023-04-18 ENCOUNTER — OFFICE VISIT (OUTPATIENT)
Dept: FAMILY MEDICINE | Facility: CLINIC | Age: 33
End: 2023-04-18
Payer: MEDICARE

## 2023-04-18 ENCOUNTER — MEDICAL CORRESPONDENCE (OUTPATIENT)
Dept: HEALTH INFORMATION MANAGEMENT | Facility: CLINIC | Age: 33
End: 2023-04-18

## 2023-04-18 ENCOUNTER — ANCILLARY PROCEDURE (OUTPATIENT)
Dept: GENERAL RADIOLOGY | Facility: CLINIC | Age: 33
End: 2023-04-18
Attending: PHYSICIAN ASSISTANT
Payer: MEDICARE

## 2023-04-18 VITALS
OXYGEN SATURATION: 96 % | DIASTOLIC BLOOD PRESSURE: 69 MMHG | RESPIRATION RATE: 20 BRPM | SYSTOLIC BLOOD PRESSURE: 100 MMHG | WEIGHT: 231 LBS | HEART RATE: 115 BPM | TEMPERATURE: 97.9 F | BODY MASS INDEX: 37.28 KG/M2

## 2023-04-18 DIAGNOSIS — R00.0 TACHYCARDIA: ICD-10-CM

## 2023-04-18 DIAGNOSIS — L89.229 PRESSURE INJURY OF SKIN OF LEFT HIP, UNSPECIFIED INJURY STAGE: ICD-10-CM

## 2023-04-18 DIAGNOSIS — R32 URINARY INCONTINENCE, UNSPECIFIED TYPE: Primary | ICD-10-CM

## 2023-04-18 DIAGNOSIS — G31.89 COGNITIVE AND NEUROBEHAVIORAL DYSFUNCTION FOLLOWING BRAIN INJURY (H): ICD-10-CM

## 2023-04-18 DIAGNOSIS — F10.96 WERNICKE-KORSAKOFF SYNDROME (ALCOHOLIC) (H): ICD-10-CM

## 2023-04-18 DIAGNOSIS — E83.52 HYPERCALCEMIA: ICD-10-CM

## 2023-04-18 DIAGNOSIS — R31.29 MICROSCOPIC HEMATURIA: ICD-10-CM

## 2023-04-18 DIAGNOSIS — M25.561 ACUTE PAIN OF RIGHT KNEE: ICD-10-CM

## 2023-04-18 DIAGNOSIS — S06.9XAS COGNITIVE AND NEUROBEHAVIORAL DYSFUNCTION FOLLOWING BRAIN INJURY (H): ICD-10-CM

## 2023-04-18 DIAGNOSIS — E83.52 HYPERCALCEMIA: Primary | ICD-10-CM

## 2023-04-18 DIAGNOSIS — F09 COGNITIVE AND NEUROBEHAVIORAL DYSFUNCTION FOLLOWING BRAIN INJURY (H): ICD-10-CM

## 2023-04-18 DIAGNOSIS — E11.65 TYPE 2 DIABETES MELLITUS WITH HYPERGLYCEMIA, WITHOUT LONG-TERM CURRENT USE OF INSULIN (H): ICD-10-CM

## 2023-04-18 DIAGNOSIS — N30.00 ACUTE CYSTITIS WITHOUT HEMATURIA: ICD-10-CM

## 2023-04-18 LAB
ALBUMIN SERPL-MCNC: 3.1 G/DL (ref 3.4–5)
ALBUMIN UR-MCNC: 30 MG/DL
ALP SERPL-CCNC: 171 U/L (ref 40–150)
ALT SERPL W P-5'-P-CCNC: 20 U/L (ref 0–50)
ANION GAP SERPL CALCULATED.3IONS-SCNC: 3 MMOL/L (ref 3–14)
APPEARANCE UR: CLEAR
AST SERPL W P-5'-P-CCNC: 20 U/L (ref 0–45)
BACTERIA #/AREA URNS HPF: ABNORMAL /HPF
BASOPHILS # BLD AUTO: 0 10E3/UL (ref 0–0.2)
BASOPHILS NFR BLD AUTO: 1 %
BILIRUB SERPL-MCNC: 0.4 MG/DL (ref 0.2–1.3)
BILIRUB UR QL STRIP: ABNORMAL
BUN SERPL-MCNC: 12 MG/DL (ref 7–30)
CALCIUM SERPL-MCNC: 10.6 MG/DL (ref 8.5–10.1)
CHLORIDE BLD-SCNC: 103 MMOL/L (ref 94–109)
CO2 SERPL-SCNC: 31 MMOL/L (ref 20–32)
COLOR UR AUTO: YELLOW
CREAT SERPL-MCNC: 0.69 MG/DL (ref 0.52–1.04)
CREAT UR-MCNC: 383 MG/DL
EOSINOPHIL # BLD AUTO: 0.1 10E3/UL (ref 0–0.7)
EOSINOPHIL NFR BLD AUTO: 1 %
ERYTHROCYTE [DISTWIDTH] IN BLOOD BY AUTOMATED COUNT: 16 % (ref 10–15)
GFR SERPL CREATININE-BSD FRML MDRD: >90 ML/MIN/1.73M2
GLUCOSE BLD-MCNC: 161 MG/DL (ref 70–99)
GLUCOSE UR STRIP-MCNC: NEGATIVE MG/DL
HBA1C MFR BLD: 6.5 % (ref 0–5.6)
HCT VFR BLD AUTO: 48 % (ref 35–47)
HGB BLD-MCNC: 14.9 G/DL (ref 11.7–15.7)
HGB UR QL STRIP: ABNORMAL
IMM GRANULOCYTES # BLD: 0 10E3/UL
IMM GRANULOCYTES NFR BLD: 0 %
KETONES UR STRIP-MCNC: ABNORMAL MG/DL
LEUKOCYTE ESTERASE UR QL STRIP: NEGATIVE
LYMPHOCYTES # BLD AUTO: 2.6 10E3/UL (ref 0.8–5.3)
LYMPHOCYTES NFR BLD AUTO: 40 %
MCH RBC QN AUTO: 26 PG (ref 26.5–33)
MCHC RBC AUTO-ENTMCNC: 31 G/DL (ref 31.5–36.5)
MCV RBC AUTO: 84 FL (ref 78–100)
MICROALBUMIN UR-MCNC: 58 MG/L
MICROALBUMIN/CREAT UR: 15.14 MG/G CR (ref 0–25)
MONOCYTES # BLD AUTO: 0.6 10E3/UL (ref 0–1.3)
MONOCYTES NFR BLD AUTO: 9 %
MUCOUS THREADS #/AREA URNS LPF: PRESENT /LPF
NEUTROPHILS # BLD AUTO: 3.2 10E3/UL (ref 1.6–8.3)
NEUTROPHILS NFR BLD AUTO: 50 %
NITRATE UR QL: NEGATIVE
PH UR STRIP: 6 [PH] (ref 5–7)
PLATELET # BLD AUTO: 432 10E3/UL (ref 150–450)
POTASSIUM BLD-SCNC: 4.4 MMOL/L (ref 3.4–5.3)
PROT SERPL-MCNC: 8.7 G/DL (ref 6.8–8.8)
RBC # BLD AUTO: 5.73 10E6/UL (ref 3.8–5.2)
RBC #/AREA URNS AUTO: ABNORMAL /HPF
SODIUM SERPL-SCNC: 137 MMOL/L (ref 133–144)
SP GR UR STRIP: 1.02 (ref 1–1.03)
SQUAMOUS #/AREA URNS AUTO: ABNORMAL /LPF
TSH SERPL DL<=0.005 MIU/L-ACNC: 1.58 MU/L (ref 0.4–4)
UROBILINOGEN UR STRIP-ACNC: 2 E.U./DL
WBC # BLD AUTO: 6.4 10E3/UL (ref 4–11)
WBC #/AREA URNS AUTO: ABNORMAL /HPF

## 2023-04-18 PROCEDURE — 81001 URINALYSIS AUTO W/SCOPE: CPT | Performed by: PHYSICIAN ASSISTANT

## 2023-04-18 PROCEDURE — 80050 GENERAL HEALTH PANEL: CPT | Performed by: PHYSICIAN ASSISTANT

## 2023-04-18 PROCEDURE — 87086 URINE CULTURE/COLONY COUNT: CPT | Performed by: PHYSICIAN ASSISTANT

## 2023-04-18 PROCEDURE — 83036 HEMOGLOBIN GLYCOSYLATED A1C: CPT | Performed by: PHYSICIAN ASSISTANT

## 2023-04-18 PROCEDURE — 93000 ELECTROCARDIOGRAM COMPLETE: CPT | Performed by: PHYSICIAN ASSISTANT

## 2023-04-18 PROCEDURE — 99215 OFFICE O/P EST HI 40 MIN: CPT | Performed by: PHYSICIAN ASSISTANT

## 2023-04-18 PROCEDURE — 73562 X-RAY EXAM OF KNEE 3: CPT | Mod: TC | Performed by: FAMILY MEDICINE

## 2023-04-18 PROCEDURE — 36415 COLL VENOUS BLD VENIPUNCTURE: CPT | Performed by: PHYSICIAN ASSISTANT

## 2023-04-18 PROCEDURE — 82043 UR ALBUMIN QUANTITATIVE: CPT | Performed by: PHYSICIAN ASSISTANT

## 2023-04-18 PROCEDURE — 82570 ASSAY OF URINE CREATININE: CPT | Performed by: PHYSICIAN ASSISTANT

## 2023-04-18 PROCEDURE — 83970 ASSAY OF PARATHORMONE: CPT | Performed by: PHYSICIAN ASSISTANT

## 2023-04-18 RX ORDER — NITROFURANTOIN 25; 75 MG/1; MG/1
100 CAPSULE ORAL 2 TIMES DAILY
Qty: 10 CAPSULE | Refills: 0 | Status: SHIPPED | OUTPATIENT
Start: 2023-04-18 | End: 2023-04-23

## 2023-04-18 RX ORDER — CALCIUM CARBONATE/VITAMIN D3 500 MG-10
TABLET,CHEWABLE ORAL
COMMUNITY
Start: 2023-04-13 | End: 2023-04-20

## 2023-04-18 RX ORDER — VENLAFAXINE HYDROCHLORIDE 75 MG/1
CAPSULE, EXTENDED RELEASE ORAL
COMMUNITY
Start: 2023-04-13 | End: 2023-04-20

## 2023-04-18 ASSESSMENT — PATIENT HEALTH QUESTIONNAIRE - PHQ9
10. IF YOU CHECKED OFF ANY PROBLEMS, HOW DIFFICULT HAVE THESE PROBLEMS MADE IT FOR YOU TO DO YOUR WORK, TAKE CARE OF THINGS AT HOME, OR GET ALONG WITH OTHER PEOPLE: SOMEWHAT DIFFICULT
SUM OF ALL RESPONSES TO PHQ QUESTIONS 1-9: 9

## 2023-04-18 ASSESSMENT — ANXIETY QUESTIONNAIRES: GAD7 TOTAL SCORE: 21

## 2023-04-18 ASSESSMENT — PAIN SCALES - GENERAL: PAINLEVEL: SEVERE PAIN (7)

## 2023-04-18 NOTE — PROGRESS NOTES
Assessment & Plan     Acute pain of right knee  Significant arthritic changes-follow up with ortho for consideration of steroid injection.  - XR Knee Right 3 Views  - Orthopedic  Referral    Type 2 diabetes mellitus with hyperglycemia, without long-term current use of insulin (H)  A1C controlled 6.5- on metformin   - Adult Eye  Referral  - Comprehensive metabolic panel (BMP + Alb, Alk Phos, ALT, AST, Total. Bili, TP)  - Hemoglobin A1c  - Comprehensive metabolic panel (BMP + Alb, Alk Phos, ALT, AST, Total. Bili, TP)  - Hemoglobin A1c  - Albumin Random Urine Quantitative with Creat Ratio    Urinary incontinence, unspecified type  Urinalysis suggest infection.  Also microscopic hematuria.  Will treat with macrobid and CT urogram and follow up with urology  - UA Macro with Reflex to Micro and Culture - lab collect  - CBC with platelets and differential  - Comprehensive metabolic panel (BMP + Alb, Alk Phos, ALT, AST, Total. Bili, TP)  - UA Macro with Reflex to Micro and Culture - lab collect  - CBC with platelets and differential  - Comprehensive metabolic panel (BMP + Alb, Alk Phos, ALT, AST, Total. Bili, TP)  - UA Microscopic with Reflex to Culture  - Urine Culture Aerobic Bacterial - lab collect  - Urine Culture Aerobic Bacterial - lab collect  - CT Urogram wo & w Contrast  - nitroFURantoin macrocrystal-monohydrate (MACROBID) 100 MG capsule  Dispense: 10 capsule; Refill: 0    Tachycardia  Sinus tachycardia on ekg.- rate of 107 on EKG   No ischemic changes.  Tachycardic 7/26/22 with echo at that time as well.  Is anticoagulated.  Limited mobility. Follow up with cardiology    - EKG 12-lead complete w/read - Clinics  - CBC with platelets and differential  - Comprehensive metabolic panel (BMP + Alb, Alk Phos, ALT, AST, Total. Bili, TP)  - TSH with free T4 reflex  - CBC with platelets and differential  - Comprehensive metabolic panel (BMP + Alb, Alk Phos, ALT, AST, Total. Bili, TP)  - TSH with free T4  reflex  - Adult Cardiology Eval  Referral    Microscopic hematuria  Schedule CT urogram and follow up with urology  - Adult Urology  Referral  - CT Urogram wo & w Contrast    Acute cystitis without hematuria  Will treat with macrobid  - nitroFURantoin macrocrystal-monohydrate (MACROBID) 100 MG capsule  Dispense: 10 capsule; Refill: 0    Pressure injury of skin of left hip, unspecified injury stage  Wearing wound vac and followed by wound clinic and home care     Wernicke-Korsakoff syndrome (alcoholic) (H)  She has neuropsych testing scheduled through [facility]     Cognitive and neurobehavioral dysfunction following brain injury (H)  As above- has upcoming neuropsych testing      Review of the result(s) of each unique test - ua, knee xray, a1c, cbc  Ordering of each unique test  Prescription drug management  53 minutes spent by me on the date of the encounter doing chart review, history and exam, documentation and further activities per the note       Patient Instructions   Take macrobid twice a day for 5 days   Schedule CT of bladder and kidneys at [facility] ([phone]) formerly called [facility].    Schedule follow up with urology for blood in the urine that you can't see.  Schedule follow up with cardiology for rapid heart rate.  Follow up with orthopedics at [facility] ([phone]) formerly called [facility] for right knee pain.   Return urgently if any change in symptoms.    Keep upcoming appointment with me 6/20/23      KATTY Brice [facility]   Hilaria is a 32 year old, presenting for the following health issues:  Medication Reconciliation        4/18/2023     9:57 AM   Additional Questions   Roomed by Yoseph   Accompanied by Cornella - Aunt     History of Present Illness       Back Pain:  She presents for follow up of back pain. Patient's back pain is a recurring  "problem.  Location of back pain:  Right middle of back and left middle of back  Description of back pain: stabbing  Back pain spreads: right knee    Since patient first noticed back pain, pain is: always present, but gets better and worse  Does back pain interfere with her job:  Not applicable      Mental Health Follow-up:  Patient presents to follow-up on Depression & Anxiety.Patient's depression since last visit has been:  No change  The patient is not having other symptoms associated with depression.  Patient's anxiety since last visit has been:  No change  The patient is not having other symptoms associated with anxiety.  Any significant life events: housing concerns, grief or loss and health concerns  Patient is feeling anxious or having panic attacks.  Patient has no concerns about alcohol or drug use.    Diabetes:   She presents for follow up of diabetes.  She is not checking blood glucose. She has no concerns regarding her diabetes at this time.  She is having numbness in feet and burning in feet. The patient has not had a diabetic eye exam in the last 12 months.         Hypertension: She presents for follow up of hypertension.  She does check blood pressure  regularly outside of the clinic. Outpatient blood pressures have not been over 140/90. She follows a low salt diet.     Headaches:   Since the patient's last clinic visit, headaches are: worsened  The patient is getting headaches:  Everyday  She is not able to do normal daily activities when she has a migraine.  The patient is taking the following rescue/relief medications:  Tylenol   Patient states \"The relief is inconsistent\" from the rescue/relief medications.   The patient is taking the following medications to prevent migraines:  No medications to prevent migraines  In the past 4 weeks, the patient has gone to an Urgent Care or Emergency Room 0 times times due to headaches.    She eats 2-3 servings of fruits and vegetables daily.She consumes 0 " "sweetened beverage(s) daily.She exercises with enough effort to increase her heart rate 9 or less minutes per day.  She exercises with enough effort to increase her heart rate 3 or less days per week.   She is taking medications regularly.    Today's PHQ-9         PHQ-9 Total Score: 9    PHQ-9 Q9 Thoughts of better off dead/self-harm past 2 weeks :   Not at all    How difficult have these problems made it for you to do your work, take care of things at home, or get along with other people: Somewhat difficult  Today's MELODY-7 Score: 21     Patient known to me (history of massive PE with cardiac arrest), diabetes, depression, sleep apnea, post covid polyneuropathy, wernicke-korsakoff syndrome with significant cognitive impairment, pressure ulcer left hip on wound vac, presents with aunt.  Mom was providing her care and assistance with medications, however, now she is in the hospital with cirrhosis of the liver.  Aunt states \"I am clueness as to what medications she is taking.\"  Did not bring med list or medications with her today.  Aunt is checking in on Hilaria regularly.  Believes she is taking potassium but unsure how much. Home care nurse is involved in care but they are unsure of her medications as well.   Has been incontinent of urine and stool.  Patient can't tell me if it is because she is unable to make it to bathroom in time or due to overflow or stress incontinence.  Aunt is trying to make sure she gets up but has been lying around. No chest pain or shortness of breath.  Last 1 1/2 weeks ago has been complaining of right knee pain.  Taking tylenol without improvement. Applying ice and heat alternating without much improvement.  As a teenager had right knee surgery- unsure     Wt Readings from Last 4 Encounters:   04/18/23 104.8 kg (231 lb)   03/21/23 111.6 kg (246 lb 1.6 oz)   03/13/23 115.2 kg (254 lb)   02/15/23 113.9 kg (251 lb 3.2 oz)               Review of Systems   Constitutional, HEENT, " cardiovascular, pulmonary, gi and gu systems are negative, except as otherwise noted.      Objective    /69   Pulse 115   Temp 97.9  F (36.6  C) (Oral)   Resp 20   SpO2 96%   There is no height or weight on file to calculate BMI.  Physical Exam   GENERAL: alert, no distress and obese  NECK: no adenopathy, no asymmetry, masses, or scars and thyroid normal to palpation  RESP: lungs clear to auscultation - no rales, rhonchi or wheezes  CV: tachycardia, normal S1 S2, no S3 or S4, no murmur, click or rub, peripheral pulses strong and no peripheral edema  ABDOMEN: soft, nontender, no hepatosplenomegaly, no masses and bowel sounds normal  MS: right knee without effusion or crepitance.  Ambulates with walker. Requires assistance up and off table .  No laxity of knee with valgus or varus stress  SKIN: wound vac applied to left hip- no edema or fluctuance   Left heel wound healing   PSYCH: concentration poor, affect flat, judgement and insight impaired and appearance well groomed    Results for orders placed or performed in visit on 04/18/23 (from the past 24 hour(s))   CBC with platelets and differential    Narrative    The following orders were created for panel order CBC with platelets and differential.  Procedure                               Abnormality         Status                     ---------                               -----------         ------                     CBC with platelets and d...[[id number]]  Abnormal            Final result                 Please view results for these tests on the individual orders.   Hemoglobin A1c   Result Value Ref Range    Hemoglobin A1C 6.5 (H) 0.0 - 5.6 %   CBC with platelets and differential   Result Value Ref Range    WBC Count 6.4 4.0 - 11.0 10e3/uL    RBC Count 5.73 (H) 3.80 - 5.20 10e6/uL    Hemoglobin 14.9 11.7 - 15.7 g/dL    Hematocrit 48.0 (H) 35.0 - 47.0 %    MCV 84 78 - 100 fL    MCH 26.0 (L) 26.5 - 33.0 pg    MCHC 31.0 (L) 31.5 - 36.5 g/dL    RDW 16.0 (H)  10.0 - 15.0 %    Platelet Count 432 150 - 450 10e3/uL    % Neutrophils 50 %    % Lymphocytes 40 %    % Monocytes 9 %    % Eosinophils 1 %    % Basophils 1 %    % Immature Granulocytes 0 %    Absolute Neutrophils 3.2 1.6 - 8.3 10e3/uL    Absolute Lymphocytes 2.6 0.8 - 5.3 10e3/uL    Absolute Monocytes 0.6 0.0 - 1.3 10e3/uL    Absolute Eosinophils 0.1 0.0 - 0.7 10e3/uL    Absolute Basophils 0.0 0.0 - 0.2 10e3/uL    Absolute Immature Granulocytes 0.0 <=0.4 10e3/uL   UA Macro with Reflex to Micro and Culture - lab collect    Specimen: Urine, Clean Catch   Result Value Ref Range    Color Urine Yellow Colorless, Straw, Light Yellow, Yellow    Appearance Urine Clear Clear    Glucose Urine Negative Negative mg/dL    Bilirubin Urine Moderate (A) Negative    Ketones Urine Trace (A) Negative mg/dL    Specific Gravity Urine 1.025 1.003 - 1.035    Blood Urine Moderate (A) Negative    pH Urine 6.0 5.0 - 7.0    Protein Albumin Urine 30 (A) Negative mg/dL    Urobilinogen Urine 2.0 (A) 0.2, 1.0 E.U./dL    Nitrite Urine Negative Negative    Leukocyte Esterase Urine Negative Negative   UA Microscopic with Reflex to Culture   Result Value Ref Range    Bacteria Urine Moderate (A) None Seen /HPF    RBC Urine 10-25 (A) 0-2 /HPF /HPF    WBC Urine 0-5 0-5 /HPF /HPF    Squamous Epithelials Urine Moderate (A) None Seen /LPF    Mucus Urine Present (A) None Seen /LPF    Narrative    Urine Culture not indicated     *Note: Due to a large number of results and/or encounters for the requested time period, some results have not been displayed. A complete set of results can be found in Results Review.       Right knee xray interpreted me with significant degenerative changes - also read by radiology

## 2023-04-18 NOTE — PATIENT INSTRUCTIONS
Take macrobid twice a day for 5 days   Schedule CT of bladder and kidneys at [facility] ([phone]) formerly called [facility].    Schedule follow up with urology for blood in the urine that you can't see.  Schedule follow up with cardiology for rapid heart rate.  Follow up with orthopedics at [facility] ([phone]) formerly called [facility] for right knee pain.   Return urgently if any change in symptoms.    Keep upcoming appointment with me 6/20/23

## 2023-04-18 NOTE — RESULT ENCOUNTER NOTE
Dear Hilaria   As we reviewed in clinic your knee xray shows a lot of arthritic changes.  Follow up with orthopedics at [facility] ([phone]) formerly called [facility] for consideration of a steroid injection.  Please call or MyChart my office with any questions or concerns.   Crissy Madrigal, PAC

## 2023-04-18 NOTE — RESULT ENCOUNTER NOTE
Please call patient and advise we need additional labs next week to recheck calcium level because now has high calcium levels and they were low before.  I am not sure if anyone is checking her MyChart results anymore since her mom is now in the hospital but I did send MyChart results as well.    Dear Hilaria  Your calcium level was high.  I has been low before.  Please schedule a lab only appointment to recheck calcium levels and parathryroid hormone levels.  Your A1C - diabetes number is up just a little but still at goal.   You are not anemic.   Your thyroid function was normal.   Please follow up with the specialists we discussed in the clinic.  Please call or MyChart my office with any questions or concerns.   Crissy Madrigal, PAC

## 2023-04-19 ENCOUNTER — TELEPHONE (OUTPATIENT)
Dept: FAMILY MEDICINE | Facility: CLINIC | Age: 33
End: 2023-04-19
Payer: MEDICARE

## 2023-04-19 DIAGNOSIS — F29 PSYCHOSIS, UNSPECIFIED PSYCHOSIS TYPE (H): Primary | ICD-10-CM

## 2023-04-19 DIAGNOSIS — S06.9XAS COGNITIVE AND NEUROBEHAVIORAL DYSFUNCTION FOLLOWING BRAIN INJURY (H): Primary | ICD-10-CM

## 2023-04-19 DIAGNOSIS — F09 COGNITIVE AND NEUROBEHAVIORAL DYSFUNCTION FOLLOWING BRAIN INJURY (H): Primary | ICD-10-CM

## 2023-04-19 DIAGNOSIS — Z98.84 S/P LAPAROSCOPIC SLEEVE GASTRECTOMY: ICD-10-CM

## 2023-04-19 DIAGNOSIS — E66.813 CLASS 3 SEVERE OBESITY DUE TO EXCESS CALORIES WITH SERIOUS COMORBIDITY AND BODY MASS INDEX (BMI) OF 45.0 TO 49.9 IN ADULT (H): ICD-10-CM

## 2023-04-19 DIAGNOSIS — G31.89 COGNITIVE AND NEUROBEHAVIORAL DYSFUNCTION FOLLOWING BRAIN INJURY (H): Primary | ICD-10-CM

## 2023-04-19 DIAGNOSIS — E66.01 CLASS 3 SEVERE OBESITY DUE TO EXCESS CALORIES WITH SERIOUS COMORBIDITY AND BODY MASS INDEX (BMI) OF 45.0 TO 49.9 IN ADULT (H): ICD-10-CM

## 2023-04-19 LAB — PTH-INTACT SERPL-MCNC: 17 PG/ML (ref 15–65)

## 2023-04-19 NOTE — TELEPHONE ENCOUNTER
"Eloise aunt is trying to help [phone].     I called Eloise and she reports that she picked up many medications for Rafaela. She does not know what they are at this time.  I will call her back around 3:30 pm and we will get the names of the medications that Hilaria and Eloise have access to and is taking. I will relay the lab [employer] message to Rafaela as written below as there is no consent to communicate with anyone but the patient.  I will be placing a care coordination referral in also for possible guardianship support.     Nursing support:  When medication reconciliation is done we will forward this to the provider for review and then we need to fax it to Lola at [facility]. Lola was notified of the plan.  Thank you. Desire Mancini R.N.    \"Dear Hilaria  Your parathyroid hormone level was normal.  Please schedule a lab only appointment next week to recheck calcium levels.  Please bring all of your medications in when you follow up with me in June.  Please have your aunt call us at [phone] with list of medications that you are taking.  Please call or MyChart my office with any questions or concerns.   Crissy Madrigal, PAC\"    Lola is calling back looking for the medication reconciliation.  Please fax to [phone] Att: Lola [facility].       "

## 2023-04-19 NOTE — TELEPHONE ENCOUNTER
This writer attempted to contact patient on 04/19/23      Reason for call relay message from provider below and unable to leave message as mailbox is full and alternative number is not in service. Per provider message below, patient's mother is in the hospital. RN did not attempt to call patient's mother.      If patient calls back:   RN called. Please relay message from provider below.     Per OV note on 4/18/23:   Patient Instructions   Take macrobid twice a day for 5 days   Schedule CT of bladder and kidneys at [facility] ([phone]) formerly called [facility].    Schedule follow up with urology for blood in the urine that you can't see.  Schedule follow up with cardiology for rapid heart rate.  Follow up with orthopedics at [facility] ([phone]) formerly called [facility] for right knee pain.   Return urgently if any change in symptoms.    Keep upcoming appointment with me 6/20/23.      Piper Becerra RN      ----- Message from Crissy Madrigal PA-C sent at 4/18/2023  6:23 PM CDT -----    Please call patient and advise we need additional labs next week to recheck calcium level because now has high calcium levels and they were low before.  I am not sure if anyone is checking her MyChart results anymore since her mom is now in the hospital but I did send MyChart results as well.    Dear Hilaria  Your calcium level was high.  I has been low before.  Please schedule a lab only appointment to recheck calcium levels and parathryroid hormone levels.  Your A1C - diabetes number is up just a little but still at goal.   You are not anemic.   Your thyroid function was normal.   Please follow up with the specialists we discussed in the clinic.  Please call or MyChart my office with any questions or concerns.   BLANK Diamond

## 2023-04-19 NOTE — TELEPHONE ENCOUNTER
"TEN Davila, called regaurding medication list concerns. They have been trying to get an updated med list for patient for a while now. Patients current med list is all over the place and has multiple duplicates. Bertram'ts mother used to manage all medications - but she was hospitalized and now patients aunt is trying to take over. Patient doesn't know what med's she has been taking, aunt doesn't either, and patients med list is not updated.      Patient was told to bring in bottles of medication for her visit yesterday 4/18 so we can update her med list with what she is currently taking. Richelle states that the med list they received after her visit with PCP is still all over the place w/ multiple duplicates. RN states \"this is a patient safety issue\" and would like to speak with clinic manager oscaring this.       Richelle Jeffery RN - [facility]   [phone]     "

## 2023-04-19 NOTE — TELEPHONE ENCOUNTER
In calling Eloise, Hilaria's dad has taken her up to the hospital to visit her mom.  We are to call around 9:00 am tomorrow 4/20/2023 to get the work we wanted to do today done.    Please see other telephone encounter dated 4/19/2023.  We will need to address that encounter also.

## 2023-04-19 NOTE — TELEPHONE ENCOUNTER
In calling Eloise, Hilaria's dad has taken her up to the hospital to visit her mom.  We are to call around 9:00 am tomorrow 4/20/2023 to get the work we wanted to do today done. Please see other telephone encounter dated 4/19/2023.  We will need to address that encounter also.Thank you. Desire Mancini R.N.

## 2023-04-19 NOTE — RESULT ENCOUNTER NOTE
Dear Hilaria  Your parathyroid hormone level was normal.  Please schedule a lab only appointment next week to recheck calcium levels.  Please bring all of your medications in when you follow up with me in June.  Please have your aunt call us at [phone] with list of medications that you are taking.  Please call or MyChart my office with any questions or concerns.   Crissy Madrigal, PAC

## 2023-04-19 NOTE — TELEPHONE ENCOUNTER
I have no idea what patient is taking.  She did not bring medications or list of medications what she is taking with her. She did not know doses of any of her medications.  Aunt was supposed to call me after visit yesterday with medications that she is taking

## 2023-04-19 NOTE — TELEPHONE ENCOUNTER
Per Lola, home care nurse, Eloise is Rafaela aunt is trying to help [phone].     When trying to call [phone] katey mailbox is full to send a SMS message press 5.     I called Eloise and she reports that she picked up many medications for Rafaela. She does not know what they are at this time.  I will call her back around 3:30 pm and we will get the names of the medications that Hilaria and Eloise have access to and is taking.     1. I will relay the lab [employer] message to Rafaela as written below as there is no consent to communicate with anyone but the patient.    2. I will be placing a care coordination referral in also for possible guardianship support.   3. I will speak to Hilaria about a consent to communicate and to clear her VM.  Thank you. Desire Mancini R.N.

## 2023-04-20 ENCOUNTER — PATIENT OUTREACH (OUTPATIENT)
Dept: CARE COORDINATION | Facility: CLINIC | Age: 33
End: 2023-04-20
Payer: MEDICARE

## 2023-04-20 LAB — BACTERIA UR CULT: NORMAL

## 2023-04-20 RX ORDER — LANOLIN ALCOHOL/MO/W.PET/CERES
100 CREAM (GRAM) TOPICAL 3 TIMES DAILY
COMMUNITY
Start: 2023-04-20

## 2023-04-20 RX ORDER — ARIPIPRAZOLE 10 MG/1
10 TABLET ORAL AT BEDTIME
COMMUNITY
Start: 2023-04-20

## 2023-04-20 NOTE — PROGRESS NOTES
Clinic Care Coordination Contact  [facility]/Voicemail      Clinical Data: CHW Outreach    Outreach attempted x 1 regarding CCC enrollment.  CHW was unable to leave a message on patient's voicemail with call back information and requested return call. Due to Patient's Mailbox is full and not accepting new messages at this time.     Plan: CHW will attempt another outreach to the patient via phone regarding enrollment into CCC in 1-2 business days.    SANGEETA Hinton  Clinic Care Coordination   [facility]   Phone: [phone]  [email or web address]

## 2023-04-20 NOTE — TELEPHONE ENCOUNTER
Med list has been updated- please fax   Is patient still getting IV infusions for polyneuropathy related to covid?  abilify needs to come from Dr. Torres

## 2023-04-20 NOTE — TELEPHONE ENCOUNTER
Please see other telephone encounter dated 4/20/2023 for further documentation ion of list message.  Thank you. Desire Mancini R.N.

## 2023-04-20 NOTE — TELEPHONE ENCOUNTER
RN called and spoke with Eloise to relay provider message below.     Eloise is unsure if she is still getting IV infusions for polyneuropathy. She did see that patient has IV appointment scheduled for tomorrow, however she mentioned that it was not approved from insurance for the infusions. [employer] has tried calling to contact the Cancer [facility] in [place] but was unable to reach them.    RN called and spoke with Missy from [facility] in [place]. Missy states Dr. Florentino Chua from neurology is the one who ordered IVIG infusions and to reach out them.    RN called Neurology clinic and left message regarding provider question regarding IV infusion. Gave clinic number [phone] to call back with updates.     Staff - if neurology calls back with an update, please route message to Crissy Madrigal.    Routing to  to assist in faxing updated med list.     Dorothy LEA RN  [facility] Primary Care Triage

## 2023-04-20 NOTE — LETTER
[facility] CARE COORDINATION  [address]  [place] [address]    April 21, 2023    Hilaria Bonner  [address]   [place] [address]      Dear Hilaria,    I am a clinic care coordinator who works with Crissy Madrigal PA-C with the [facility]. I wanted to introduce myself and provide you with contact information to call with any questions or concerns. Below is a description of clinic care coordination and how our team can further assist you.       The clinic care coordination team is made up of a registered nurse, , financial resource worker and community health worker who understand the health care system. The goal of clinic care coordination is to help you manage your health and improve access to the health care system. Our team works alongside your provider to assist you in determining your health and social needs. We can help you obtain health care and community resources, providing you with necessary information and education. We can work with you through any barriers and develop a care plan that helps coordinate and strengthen the communication between you and your care team.  Our services are voluntary and are offered without charge to you personally.    Please feel free to contact Lea BULLOCK [phone] with any questions or concerns regarding care coordination and what we can offer.      We are focused on providing you with the highest-quality healthcare experience possible.    Sincerely,       Dahlia RODRÍGUEZ  Community Health Worker  [facility]  Clinic Care Coordination  [facility]    
No

## 2023-04-20 NOTE — TELEPHONE ENCOUNTER
I did receive verbal permission from Hilaria to speak with Eloise (aunt).   Eloise is at [facility] every day 9:00 AM until about 12:00 pm noon.     Provider:  I have worked with Eloise(aunt) to try to straighten out Dianns medications. Please take actions appropriate for the numeric items below.  1. Medications items 1-20 are all on her medication lists except # 11.  Please review and see if you agree with them staying on the medication list. Please remove any you do not agree with and have the RN team inform Eloise Manrique and Lola (home care RN)   2. Medication blocks labeled A and B are items that are on her medication list.  Please review and REMOVE any that do not need to be on there.    3. Please sign the historical medication corrections (B1 and Abilify) if you agree with them.  4. Please address the discrepancies listed below by   A. Correcting the omeprazole Prescription(s) on her medication list or leaving it if the med list is correct  B. Add Centrum fruit chews to her medication list or noting that she does not need this item.  5. Please review her medication list, after the above actions are taken, and state whether she needs to take any other items that are not on her medication list.   6. When these items have been completed please note so and send to DINA PEMBERTON and TEN PEMBERTON: To fax new reconciled medication list to Lola at the stated number - Please fax to [phone] Att: Lola Mckeon [facility].  NADIA RN to call Lola (home care RN    Lola RN - [phone]     and Eloise to tell then what has been done and if there are any changes with her medications. Thank you. Desire Mancini R.N.      Nursing support:   1. Hilaria and Eloise are aware that I have place a care coordination referral on their behalf.  They will be calling.    2. I have asked that they come and sign a consent to communicate for Alyciahector.  3. Eloise reports that she has a  that she can  reach out to to possibly start the process of guardianship for Hilaria. She will do this.  4. They were notified that they have access to a nurse 24 hours a day by calling [phone].  5. Patient was given the lab results from Crissy. Eloise declined making that appointment for 1 week at this time and she will call back.    Discrepancies:   1. Omeprazole 20 mg - chart states twice a day Eloise reports once a day   2. Centrum fresh fruit chewable vitamins - 1 chewable tablet by mouth daily. Abby Sterling - Is not on her medication list.  The items below are the items Hilaria has been taking because they do have these medications    1. Macrobid 100 mg - 1 capsule 2 times daily for 5 days. Started today 4/20/2023  2. Metformin  mg - 1 tablet by mouth daily with dinner  3. Potassium Cl E.R. 10 meq- 2 tablets by mouth twice daily  4. Calcium 500 mg CHW with vitamin D 10 mg- 1 tablet by mouth 3 times a day (Prescription(s) given) Kylah Moseley   5. Hydroxyzine HCL 25 mg - 1-2 tablets by mouth every 5 hours as needed for anxiety Dr. Warren Thomas/Melisa Riley   She is truly taking it as a PRN and only once when mom went tot [facility]  6. Venlafaxine E.R cap 150 mg -  1 capsule daily to be taken with a 75 mg tablets to = 225 mg daily-  Ran out as of  dose w a few days ago. Filled by Linda Cavazos needs refill Eloise was advised she should have refills  7. Prazosin 1 mg - 2 capsules at mouth at bedtime Dr. Warren Thomas  8. Folic acid 1 mg tablet- 1 tablet by mouth daily Crissy Riley  9. Trazodone 50 mg- 1 tablet by mouth every night as needed for sleep Dr. Warren Thomas  10. Omeprazole 20 mg capsules - Take 1 capsule daily by mouth Abby Sterling   11. Centrum fresh fruit chewable vitamins - 1 chewable tablet by mouth daily. Abby Sterling   12. Olanzapine 10 mg tablets- take 1 tablet at bedtime  Dr. Warren Thomas  13. Vitamin B1 100 mg tablets - 1 tablets by mouth daily   Linda  14. Vitamin D3 2000 international unit(s) tablets - 1 tablet by mouth daily Crissy Riley  15. Pregabalin 200 mg capsules - 1 capsule by mouth 3 times a day Crissy Riley  16. FeroSul 325 mg tablets - 1 tablet every other day Linda \[facility] [place] [facility]\""  17. Venlafaxine E.R. 75 mg capsule - 1 capsule daily with a 150 mg capsule to equal 225 mg daily Dr. Warren Thomas  18. Aripiprazole 10 mg tablets- 1 tablet by mouth at bedtime Linda  19. Eliquis 5 mg tablets- 1 tablet by mouth twice daily - Kylah Moseley  20. Olanzapine 2.5 mg tablets - 1 tablet by mouth twice daily as needed for agitation or psychosis Dr. Warren Thomas    Creams she has  1. Ketoconazole 2% cream 60 gm - apply topically daily to hands and feet (she does do this)  2. ammonium lactate (AMLACTIN) 12 % external cream - APPLY TOPICALLY TO THE AFFECTED AREA TWICE DAILY.  She is doing this to her stomach area    3. Accu check strips - test once daily or as directed   4. Polyethylene Glycol 3350 powder- 1 scop 17 gm in liquid by mouth daily hold for loose stools  Linda ZUNIGA

## 2023-04-21 ENCOUNTER — INFUSION THERAPY VISIT (OUTPATIENT)
Dept: INFUSION THERAPY | Facility: CLINIC | Age: 33
End: 2023-04-21
Attending: PSYCHIATRY & NEUROLOGY
Payer: MEDICARE

## 2023-04-21 VITALS
OXYGEN SATURATION: 100 % | SYSTOLIC BLOOD PRESSURE: 118 MMHG | RESPIRATION RATE: 20 BRPM | HEART RATE: 90 BPM | TEMPERATURE: 98.1 F | DIASTOLIC BLOOD PRESSURE: 85 MMHG

## 2023-04-21 DIAGNOSIS — G61.81 CHRONIC INFLAMMATORY DEMYELINATING POLYNEUROPATHY (H): Primary | ICD-10-CM

## 2023-04-21 PROCEDURE — 96366 THER/PROPH/DIAG IV INF ADDON: CPT

## 2023-04-21 PROCEDURE — 258N000003 HC RX IP 258 OP 636: Performed by: PSYCHIATRY & NEUROLOGY

## 2023-04-21 PROCEDURE — 250N000013 HC RX MED GY IP 250 OP 250 PS 637: Performed by: PSYCHIATRY & NEUROLOGY

## 2023-04-21 PROCEDURE — 96365 THER/PROPH/DIAG IV INF INIT: CPT

## 2023-04-21 PROCEDURE — 250N000011 HC RX IP 250 OP 636: Performed by: PSYCHIATRY & NEUROLOGY

## 2023-04-21 PROCEDURE — 250N000011 HC RX IP 250 OP 636: Performed by: PHYSICIAN ASSISTANT

## 2023-04-21 PROCEDURE — 96375 TX/PRO/DX INJ NEW DRUG ADDON: CPT

## 2023-04-21 RX ORDER — ACETAMINOPHEN 325 MG/1
650 TABLET ORAL ONCE
Status: COMPLETED | OUTPATIENT
Start: 2023-04-21 | End: 2023-04-21

## 2023-04-21 RX ORDER — METHYLPREDNISOLONE SODIUM SUCCINATE 40 MG/ML
20 INJECTION, POWDER, LYOPHILIZED, FOR SOLUTION INTRAMUSCULAR; INTRAVENOUS ONCE
Status: CANCELLED
Start: 2023-05-12 | End: 2023-04-21

## 2023-04-21 RX ORDER — ALBUTEROL SULFATE 0.83 MG/ML
2.5 SOLUTION RESPIRATORY (INHALATION)
Status: CANCELLED | OUTPATIENT
Start: 2023-04-21

## 2023-04-21 RX ORDER — HEPARIN SODIUM,PORCINE 10 UNIT/ML
5 VIAL (ML) INTRAVENOUS
Status: CANCELLED | OUTPATIENT
Start: 2023-04-21

## 2023-04-21 RX ORDER — ACETAMINOPHEN 325 MG/1
650 TABLET ORAL ONCE
Status: CANCELLED
Start: 2023-04-21

## 2023-04-21 RX ORDER — DIPHENHYDRAMINE HYDROCHLORIDE 50 MG/ML
50 INJECTION INTRAMUSCULAR; INTRAVENOUS
Status: CANCELLED
Start: 2023-04-21

## 2023-04-21 RX ORDER — METHYLPREDNISOLONE SODIUM SUCCINATE 40 MG/ML
20 INJECTION, POWDER, LYOPHILIZED, FOR SOLUTION INTRAMUSCULAR; INTRAVENOUS ONCE
Status: COMPLETED | OUTPATIENT
Start: 2023-04-21 | End: 2023-04-21

## 2023-04-21 RX ORDER — HEPARIN SODIUM (PORCINE) LOCK FLUSH IV SOLN 100 UNIT/ML 100 UNIT/ML
5 SOLUTION INTRAVENOUS
Status: CANCELLED | OUTPATIENT
Start: 2023-04-21

## 2023-04-21 RX ORDER — DIPHENHYDRAMINE HCL 25 MG
50 CAPSULE ORAL ONCE
Status: COMPLETED | OUTPATIENT
Start: 2023-04-21 | End: 2023-04-21

## 2023-04-21 RX ORDER — DIPHENHYDRAMINE HCL 25 MG
50 CAPSULE ORAL ONCE
Status: CANCELLED | OUTPATIENT
Start: 2023-04-21

## 2023-04-21 RX ORDER — EPINEPHRINE 1 MG/ML
0.3 INJECTION, SOLUTION INTRAMUSCULAR; SUBCUTANEOUS EVERY 5 MIN PRN
Status: CANCELLED | OUTPATIENT
Start: 2023-04-21

## 2023-04-21 RX ORDER — MEPERIDINE HYDROCHLORIDE 25 MG/ML
25 INJECTION INTRAMUSCULAR; INTRAVENOUS; SUBCUTANEOUS EVERY 30 MIN PRN
Status: CANCELLED | OUTPATIENT
Start: 2023-04-21

## 2023-04-21 RX ORDER — ALBUTEROL SULFATE 90 UG/1
1-2 AEROSOL, METERED RESPIRATORY (INHALATION)
Status: CANCELLED
Start: 2023-04-21

## 2023-04-21 RX ORDER — METHYLPREDNISOLONE SODIUM SUCCINATE 125 MG/2ML
125 INJECTION, POWDER, LYOPHILIZED, FOR SOLUTION INTRAMUSCULAR; INTRAVENOUS
Status: CANCELLED
Start: 2023-04-21

## 2023-04-21 RX ADMIN — METHYLPREDNISOLONE SODIUM SUCCINATE 20 MG: 40 INJECTION, POWDER, FOR SOLUTION INTRAMUSCULAR; INTRAVENOUS at 09:29

## 2023-04-21 RX ADMIN — DIPHENHYDRAMINE HYDROCHLORIDE 50 MG: 25 CAPSULE ORAL at 09:25

## 2023-04-21 RX ADMIN — ACETAMINOPHEN 650 MG: 325 TABLET, FILM COATED ORAL at 09:25

## 2023-04-21 RX ADMIN — HUMAN IMMUNOGLOBULIN G 60 G: 40 LIQUID INTRAVENOUS at 09:46

## 2023-04-21 RX ADMIN — SODIUM CHLORIDE 250 ML: 9 INJECTION, SOLUTION INTRAVENOUS at 09:25

## 2023-04-21 ASSESSMENT — PAIN SCALES - GENERAL: PAINLEVEL: NO PAIN (0)

## 2023-04-21 NOTE — PROGRESS NOTES
Infusion Nursing Note:  Hilaria Bonner presents today for IVIG infusion.    Patient seen by provider today: No   present during visit today: Not Applicable.    Note: pt arrives ambulatory with walker. .      Intravenous Access:  Peripheral IV placed.    Treatment Conditions:  Not Applicable.      Post Infusion Assessment:  Patient tolerated infusion without incident.  Site patent and intact, free from redness, edema or discomfort.  No evidence of extravasations.  Access discontinued per protocol.       Discharge Plan:   Discharge instructions reviewed with: Patient.  Patient and/or family verbalized understanding of discharge instructions and all questions answered.  Patient discharged in stable condition accompanied by: self.  Departure Mode: Ambulatory with walker. Aunt picking her up in [place].      Danyell Jameson RN

## 2023-04-24 NOTE — PATIENT INSTRUCTIONS
MEDICATIONS:  Continue current medications without change  FUTURE APPOINTMENTS:       - Follow-up visit in 3 months return MWM with Joanne  SELF MONITORING: monitor weight at least weekly    SUPPORT GROUPS    The following information can be also be found on our websites:    [email or web address]/overarching-care/weight-loss-surgery-and-medical-weight-management/weight-loss-support-groups    [email or web address]/care/overarching-care/weight-loss-[email or web address]-surgery-adult/support-groups    [email or web address]/OverCommunity Hospitaling-Care/Weight-Loss-Surgery-and-Medical-Weight-Management/Weight-Loss-Surgery-Support-Group    We offer support groups for patients who are working on weight loss and considering, preparing for or have had weight loss surgery.     You are invited to attend the?Virtual Support Groups?provided by any of the following locations:    1. [facility] via Microsoft Teams with Shannon Rodriguez RN  2.   [place] via [employer] with Crescencio Quezada, PhD, LP  3.   [place] via [employer] with Bertha Dorman RN  4.   [facility] via Microsoft Teams with Bertha Mobley [facility] Weight Loss Surgery Support Group    [facility] Weight Loss Surgery Support Group    The [facility] Weight Loss Surgery Support Group is a patient-lead forum that meets monthly. Due to Covid-19, we are meeting remotely from 5 PM - 6 PM via Microsoft Teams. The group is facilitated by Sahnnon Rodriguez, the program s Clinical Coordinator. The support group shares experiences, encouragement and education. It is open to those who have had weight loss surgery, are scheduled for surgery, and those who are considering surgery.   If you are interested in attending, please contact the clinic via [employer] or call the nurse line directly at [phone] to inform our staff that you would like an invite sent to you. Please let us know the email you would like the invite  sent to. Prior to the meeting, a link with directions on how to join the meeting will be sent to you.    2020 Meetings December 2 - The group will not have a speaker. The focus will be to offer support to one another during the Holiday season.    2021 Meetings January 20 - Guest Speaker: Pranav Cervantes RD, SUREKHA     February 17 - The group will not have a speaker. This will be a time of group support.     March 17 - Guest Speaker: Clementina Obrien, [facility], [facility], [facility]     [facility] and [facility] Support Groups    Connections: Bariatric Care Support Group?  This group takes place the second Tuesday of each month from 6:30 PM - 8 PM virtually using Microsoft Teams. It is led by Crescencio Quezada, Ph.D who is a Licensed Psychologist with the [facility] Comprehensive Weight Management Program. There is no cost for group participation and it is open to all [facility] (and those external to this program) pre- and post- operative bariatric surgery patients as well as their support system.   Please send an email to Crescencio Quezada, Ph.D., LP at?[email or web address]?if you would like an invitation to the group and to learn about using [employer] Teams.    2020 Meetings     November 10 - Healthy Eating  Guest Speaker: Viktoriya Locke RD, SUREKHA     December 8    Connections: Post-Operative Bariatric Surgery Support Group  This support group meets the 4th Wednesday of the month from 11 AM - 12 PM virtually using Microsoft Teams. It is led by Bertha Dorman RN. This is a support group for [facility] bariatric patients (and those external to [facility]) who have had bariatric surgery and are at least 3 months post-surgery. There is no cost to attend and registration is not required. Please send an email to Bertha Dorman RN at [email or web address] if you would like an invitation to the group and to learn about using Microsoft Teams.    2020 Meetings November  25 December 23    [facility] Healthy Lifestyle Virtual Support Group    Healthy Lifestyle Virtual Support Group?    This group is held monthly on a Friday on the fourth Friday from 12:30 PM - to 1:30 PM. It is 60 minutes of small group guided discussion, support and resources led by National Board Certified Health , Bertha Mobley. All are welcome who want a healthy lifestyle. To receive monthly invites to the Healthy Lifestyle Virtual Support Group or if you have any questions about having a health  or attending support group, please email Bertha [email or web address]. Bertha will send out invites for each session, with the phone number and the conference ID number specific to that session.    2020 Meetings     November 13 - The Importance of Self Care and Connection During Covid    December 18 - Open Forum    2021 Meetings    January 29 - How to Stay Active and Healthy during the Winter Months    February 26 - Reading Food Labels: What do I Need to Know?  Guest Speaker: Jessie Linton RD    March 19 - Finding Health, Happiness and Confidence at Every Size  Guest Speaker: Arabella Jensen, Health Psychology Fellow    April 30 - Healthy Eating on a Budget  Guest Speaker: Chen Darby RD    May 21 - Open Forum       [FreeTextEntry1] : Patient presents for an over due follow up visit. He has developed progressive dyspnea since his last visit. He has a known history of moderate aortic stenosis and has been diagnosed with liver disease and currently being worked up for liver cirrhosis.

## 2023-04-25 ENCOUNTER — TELEPHONE (OUTPATIENT)
Dept: FAMILY MEDICINE | Facility: CLINIC | Age: 33
End: 2023-04-25
Payer: MEDICARE

## 2023-04-25 NOTE — TELEPHONE ENCOUNTER
Forms/Letter Request    Type of form/letter: [facility]     Have you been seen for this request: N/A    Do we have the form/letter: Yes: Will place forms     When is form/letter needed by: asap    How would you like the form/letter returned: Fax : [phone]

## 2023-04-26 ENCOUNTER — DOCUMENTATION ONLY (OUTPATIENT)
Dept: PSYCHOLOGY | Facility: CLINIC | Age: 33
End: 2023-04-26
Payer: MEDICARE

## 2023-04-26 NOTE — PROGRESS NOTES
No show for the patient. MSG was left in her phone. The scheduling team number was left also. No call back, no answer when called.

## 2023-04-26 NOTE — TELEPHONE ENCOUNTER
Please see telephone encounter dated 4/19/2023 for further documentation of this message.  Thank you. Desire Mancini R.N.

## 2023-04-27 ENCOUNTER — TELEPHONE (OUTPATIENT)
Dept: FAMILY MEDICINE | Facility: CLINIC | Age: 33
End: 2023-04-27
Payer: MEDICARE

## 2023-04-27 NOTE — TELEPHONE ENCOUNTER
Provider:  Please see items # 1 and 2 below.  Please advise.   Thank you. Desire Lindquist is calling with 2 items.     1. FYI: They are required to let the provider know about any vitals outside their parameters. Her blood pressure today was:  BP: 108/92  P:125. She had just taken her medication right before Ameena had gotten there. If recommendations are given they need to go to the patient or her family.     Ameena is requesting verbal orders for speech therapy after evaluation:    2. Speech therapy: 1 x a week for 3 weeks  - to work on cognition and memory.      Ok to leave a message with the provider's response.

## 2023-04-27 NOTE — TELEPHONE ENCOUNTER
Attempted to reach Ameena. There was no answer so the provider's approval of the requested VO for Speech therapy: 1 x a week for 3 weeks  - to work on cognition and memory, was left on Ameena's confidential voicemail. RN also added in voicemail message that RN will attempt to contact pt to notify of provider's instructions that a pulse of 125 needs to go to emergency department.    Attempted to reach pt to relay provider 4/27/2023  3:15 PM message below that pulse of 125 needs to go to emergency department. There was no answer. Left message to return call to a nurse at [phone].    Piper Theodore, JONATHANN, RN

## 2023-04-27 NOTE — TELEPHONE ENCOUNTER
Forms/Letter Request    Type of form/letter: [facility]     Have you been seen for this request: N/A    Do we have the form/letter: Yes: Will place forms on providers desk for review/signature    When is form/letter needed by: asap    How would you like the form/letter returned: Fax : [phone]

## 2023-04-27 NOTE — TELEPHONE ENCOUNTER
Pulse of 125 needs to go to emergency department  Ok for speech therapy I thought it was discontinued because of no shows

## 2023-04-28 ENCOUNTER — TELEPHONE (OUTPATIENT)
Dept: FAMILY MEDICINE | Facility: CLINIC | Age: 33
End: 2023-04-28
Payer: MEDICARE

## 2023-04-28 DIAGNOSIS — Z98.84 S/P LAPAROSCOPIC SLEEVE GASTRECTOMY: ICD-10-CM

## 2023-04-28 RX ORDER — CHOLECALCIFEROL (VITAMIN D3) 50 MCG
1 TABLET ORAL DAILY
Qty: 30 TABLET | Refills: 5 | Status: SHIPPED | OUTPATIENT
Start: 2023-04-28

## 2023-04-28 RX ORDER — FERROUS SULFATE 325(65) MG
325 TABLET ORAL
Qty: 30 TABLET | Refills: 5 | Status: SHIPPED | OUTPATIENT
Start: 2023-04-28

## 2023-04-28 NOTE — TELEPHONE ENCOUNTER
"[facility] RN is calling for medication refills.    \"When patient was at the hospital, she was placed on these medications.\"    Summer Terrazas RN  [facility]          "

## 2023-04-28 NOTE — TELEPHONE ENCOUNTER
Called patient with verbal permission granted to speak with patient's aunt, Eloise.     Writer relayed provider's message below for patient to go to ED for further evaluation. Aunt verbalized understanding and agrees to take patient to ED.     TEN Ortega  [facility]

## 2023-04-28 NOTE — TELEPHONE ENCOUNTER
Forms/Letter Request    Type of form/letter: [facility]     Have you been seen for this request: N/A    Do we have the form/letter: Yes: Will place forms on providers desk for review/signature.     When is form/letter needed by: asap    How would you like the form/letter returned: Fax : [phone]

## 2023-04-28 NOTE — TELEPHONE ENCOUNTER
Called Ameena, speech pathologist and relayed provider's message below informing approval of verbal orders for  Speech therapy: 1 x a week for 3 weeks  - to work on cognition and memory.       Called patient and informed provider's message below. Patient requested nurse speak with aunt, Eloise Mendez (no CTC on file, but patient gave verbal permission) Writer relayed provider's message to aunt that pulse of 125 needs to go to emergency department.     Aunt states wound nurse came out to the house this morning and HR was 112. Aunt would like to know if patient still needs to go to ER? If so, aunt will be with patient for the next few hours and can take her.     Routing to provider to review and advise if HR of 112 still needs to go to ER. Please provider acceptable parameters to relay to patient/family.     TEN Ortega  [facility]

## 2023-04-29 ENCOUNTER — NURSE TRIAGE (OUTPATIENT)
Dept: NURSING | Facility: CLINIC | Age: 33
End: 2023-04-29
Payer: MEDICARE

## 2023-04-29 NOTE — TELEPHONE ENCOUNTER
Patient had a message from [employer] on 4/27 and is returning the call to find out what the message was for. In the chart notes medications were filled for the patient and forms were faxed.   Anne Mcnair RN on 4/29/2023 at 2:13 PM      Reason for Disposition    General information question, no triage required and triager able to answer question    Additional Information    Negative: [1] Caller is not with the adult (patient) AND [2] reporting urgent symptoms    Negative: Lab result questions    Negative: Medication questions    Negative: Caller can't be reached by phone    Negative: Caller has already spoken to PCP or another triager    Negative: RN needs further essential information from caller in order to complete triage    Negative: Health Information question, no triage required and triager able to answer question    Protocols used: INFORMATION ONLY CALL - NO TRIAGE-A-

## 2023-05-01 ENCOUNTER — TELEPHONE (OUTPATIENT)
Dept: FAMILY MEDICINE | Facility: CLINIC | Age: 33
End: 2023-05-01
Payer: MEDICARE

## 2023-05-01 NOTE — TELEPHONE ENCOUNTER
Forms/Letter Request    Type of form/letter: [facility]    Have you been seen for this request: N/A    Do we have the form/letter: Yes: Will place on providers desk for review/signature    When is form/letter needed by: asap    How would you like the form/letter returned: Fax : [phone]

## 2023-05-02 ENCOUNTER — MEDICAL CORRESPONDENCE (OUTPATIENT)
Dept: FAMILY MEDICINE | Facility: CLINIC | Age: 33
End: 2023-05-02
Payer: MEDICARE

## 2023-05-02 NOTE — TELEPHONE ENCOUNTER
DIAGNOSIS: Acute Pain of R Knee   APPOINTMENT DATE: 05/16/2023    NOTES STATUS DETAILS   OFFICE NOTE from referring provider Internal 04/18/2023 Dr Madrigal [facility]    OFFICE NOTE from other specialist N/A    DISCHARGE SUMMARY from hospital N/A    DISCHARGE REPORT from the ER N/A    OPERATIVE REPORT N/A    EMG report N/A    MEDICATION LIST N/A    MRI N/A    DEXA (osteoporosis/bone health) N/A    CT SCAN N/A    XRAYS (IMAGES & REPORTS) Internal 04/18/2023 RT knee

## 2023-05-04 ENCOUNTER — MEDICAL CORRESPONDENCE (OUTPATIENT)
Dept: HEALTH INFORMATION MANAGEMENT | Facility: CLINIC | Age: 33
End: 2023-05-04
Payer: MEDICARE

## 2023-05-04 ENCOUNTER — TELEPHONE (OUTPATIENT)
Dept: FAMILY MEDICINE | Facility: CLINIC | Age: 33
End: 2023-05-04
Payer: MEDICARE

## 2023-05-04 NOTE — TELEPHONE ENCOUNTER
Forms/Letter Request    Type of form/letter: [facility]     Have you been seen for this request: N/A    Do we have the form/letter: Yes: Will place forms on providers desk for review/signature    When is form/letter needed by: asap    How would you like the form/letter returned: Fax : [phone]

## 2023-05-05 NOTE — TELEPHONE ENCOUNTER
[facility] Home Health forms signed by provider will be faxed to [phone]    Lola Wick Facilitator

## 2023-05-08 ENCOUNTER — TELEPHONE (OUTPATIENT)
Dept: FAMILY MEDICINE | Facility: CLINIC | Age: 33
End: 2023-05-08
Payer: MEDICARE

## 2023-05-08 NOTE — TELEPHONE ENCOUNTER
Forms/Letter Request    Type of form/letter: [facility]    Have you been seen for this request: N/A    Do we have the form/letter: Yes: Will place forms on providers desk for review/signature.    When is form/letter needed by: asap    How would you like the form/letter returned: Fax : [phone]

## 2023-05-09 NOTE — TELEPHONE ENCOUNTER
[facility] forms signed will be faxed to [phone]    Lola TORRES Visit Facilitator

## 2023-05-12 ENCOUNTER — INFUSION THERAPY VISIT (OUTPATIENT)
Dept: INFUSION THERAPY | Facility: CLINIC | Age: 33
End: 2023-05-12
Attending: PSYCHIATRY & NEUROLOGY
Payer: MEDICARE

## 2023-05-12 VITALS
SYSTOLIC BLOOD PRESSURE: 115 MMHG | HEART RATE: 95 BPM | TEMPERATURE: 97.9 F | WEIGHT: 248.8 LBS | OXYGEN SATURATION: 98 % | DIASTOLIC BLOOD PRESSURE: 75 MMHG | BODY MASS INDEX: 40.16 KG/M2 | RESPIRATION RATE: 18 BRPM

## 2023-05-12 DIAGNOSIS — G61.81 CHRONIC INFLAMMATORY DEMYELINATING POLYNEUROPATHY (H): Primary | ICD-10-CM

## 2023-05-12 PROCEDURE — 250N000013 HC RX MED GY IP 250 OP 250 PS 637: Performed by: PSYCHIATRY & NEUROLOGY

## 2023-05-12 PROCEDURE — 258N000003 HC RX IP 258 OP 636: Performed by: PSYCHIATRY & NEUROLOGY

## 2023-05-12 PROCEDURE — 250N000011 HC RX IP 250 OP 636: Performed by: PSYCHIATRY & NEUROLOGY

## 2023-05-12 PROCEDURE — 250N000011 HC RX IP 250 OP 636: Performed by: PHYSICIAN ASSISTANT

## 2023-05-12 PROCEDURE — 96365 THER/PROPH/DIAG IV INF INIT: CPT

## 2023-05-12 PROCEDURE — 96375 TX/PRO/DX INJ NEW DRUG ADDON: CPT

## 2023-05-12 PROCEDURE — 96366 THER/PROPH/DIAG IV INF ADDON: CPT

## 2023-05-12 RX ORDER — METHYLPREDNISOLONE SODIUM SUCCINATE 40 MG/ML
20 INJECTION, POWDER, LYOPHILIZED, FOR SOLUTION INTRAMUSCULAR; INTRAVENOUS ONCE
Status: CANCELLED
Start: 2023-05-12 | End: 2023-05-12

## 2023-05-12 RX ORDER — DIPHENHYDRAMINE HYDROCHLORIDE 50 MG/ML
50 INJECTION INTRAMUSCULAR; INTRAVENOUS
Status: CANCELLED
Start: 2023-05-12

## 2023-05-12 RX ORDER — TOPIRAMATE 50 MG/1
50 TABLET, FILM COATED ORAL 2 TIMES DAILY
COMMUNITY

## 2023-05-12 RX ORDER — HEPARIN SODIUM (PORCINE) LOCK FLUSH IV SOLN 100 UNIT/ML 100 UNIT/ML
5 SOLUTION INTRAVENOUS
Status: CANCELLED | OUTPATIENT
Start: 2023-05-12

## 2023-05-12 RX ORDER — ALBUTEROL SULFATE 0.83 MG/ML
2.5 SOLUTION RESPIRATORY (INHALATION)
Status: CANCELLED | OUTPATIENT
Start: 2023-05-12

## 2023-05-12 RX ORDER — ALBUTEROL SULFATE 90 UG/1
1-2 AEROSOL, METERED RESPIRATORY (INHALATION)
Status: CANCELLED
Start: 2023-05-12

## 2023-05-12 RX ORDER — DIPHENHYDRAMINE HCL 25 MG
50 CAPSULE ORAL ONCE
Status: COMPLETED | OUTPATIENT
Start: 2023-05-12 | End: 2023-05-12

## 2023-05-12 RX ORDER — ACETAMINOPHEN 325 MG/1
650 TABLET ORAL ONCE
Status: CANCELLED
Start: 2023-05-12

## 2023-05-12 RX ORDER — HEPARIN SODIUM,PORCINE 10 UNIT/ML
5 VIAL (ML) INTRAVENOUS
Status: CANCELLED | OUTPATIENT
Start: 2023-05-12

## 2023-05-12 RX ORDER — METHYLPREDNISOLONE SODIUM SUCCINATE 125 MG/2ML
125 INJECTION, POWDER, LYOPHILIZED, FOR SOLUTION INTRAMUSCULAR; INTRAVENOUS
Status: CANCELLED
Start: 2023-05-12

## 2023-05-12 RX ORDER — EPINEPHRINE 1 MG/ML
0.3 INJECTION, SOLUTION INTRAMUSCULAR; SUBCUTANEOUS EVERY 5 MIN PRN
Status: CANCELLED | OUTPATIENT
Start: 2023-05-12

## 2023-05-12 RX ORDER — ACETAMINOPHEN 325 MG/1
650 TABLET ORAL ONCE
Status: COMPLETED | OUTPATIENT
Start: 2023-05-12 | End: 2023-05-12

## 2023-05-12 RX ORDER — DIPHENHYDRAMINE HCL 25 MG
50 CAPSULE ORAL ONCE
Status: CANCELLED | OUTPATIENT
Start: 2023-05-12

## 2023-05-12 RX ORDER — METHYLPREDNISOLONE SODIUM SUCCINATE 40 MG/ML
20 INJECTION, POWDER, LYOPHILIZED, FOR SOLUTION INTRAMUSCULAR; INTRAVENOUS ONCE
Status: COMPLETED | OUTPATIENT
Start: 2023-05-12 | End: 2023-05-12

## 2023-05-12 RX ORDER — MEPERIDINE HYDROCHLORIDE 25 MG/ML
25 INJECTION INTRAMUSCULAR; INTRAVENOUS; SUBCUTANEOUS EVERY 30 MIN PRN
Status: CANCELLED | OUTPATIENT
Start: 2023-05-12

## 2023-05-12 RX ADMIN — ACETAMINOPHEN 650 MG: 325 TABLET, FILM COATED ORAL at 09:21

## 2023-05-12 RX ADMIN — METHYLPREDNISOLONE SODIUM SUCCINATE 20 MG: 40 INJECTION, POWDER, FOR SOLUTION INTRAMUSCULAR; INTRAVENOUS at 09:25

## 2023-05-12 RX ADMIN — HUMAN IMMUNOGLOBULIN G 60 G: 40 LIQUID INTRAVENOUS at 09:33

## 2023-05-12 RX ADMIN — SODIUM CHLORIDE 250 ML: 9 INJECTION, SOLUTION INTRAVENOUS at 09:22

## 2023-05-12 RX ADMIN — DIPHENHYDRAMINE HYDROCHLORIDE 50 MG: 25 CAPSULE ORAL at 09:21

## 2023-05-12 ASSESSMENT — PAIN SCALES - GENERAL: PAINLEVEL: MODERATE PAIN (5)

## 2023-05-12 NOTE — PROGRESS NOTES
Infusion Nursing Note:  Hilaria Gonzalezee presents today for IVIG.    Patient seen by provider today: No   present during visit today: Not Applicable.    Note: pt arrives ambulatory with walker. .      Intravenous Access:  Peripheral IV placed.    Treatment Conditions:  Not Applicable.      Post Infusion Assessment:  Patient tolerated infusion without incident.  Site patent and intact, free from redness, edema or discomfort.  Access discontinued per protocol.       Discharge Plan:   Discharge instructions reviewed with: Patient.  Patient and/or family verbalized understanding of discharge instructions and all questions answered.  Patient discharged in stable condition accompanied by: self.  Departure Mode: Ambulatory.      Danyell Jameson RN

## 2023-05-13 ENCOUNTER — MEDICAL CORRESPONDENCE (OUTPATIENT)
Dept: HEALTH INFORMATION MANAGEMENT | Facility: CLINIC | Age: 33
End: 2023-05-13
Payer: MEDICARE

## 2023-05-16 ENCOUNTER — PRE VISIT (OUTPATIENT)
Dept: ORTHOPEDICS | Facility: CLINIC | Age: 33
End: 2023-05-16

## 2023-05-16 ENCOUNTER — TELEPHONE (OUTPATIENT)
Dept: FAMILY MEDICINE | Facility: CLINIC | Age: 33
End: 2023-05-16

## 2023-05-16 ENCOUNTER — OFFICE VISIT (OUTPATIENT)
Dept: ORTHOPEDICS | Facility: CLINIC | Age: 33
End: 2023-05-16
Attending: PHYSICIAN ASSISTANT
Payer: MEDICARE

## 2023-05-16 ENCOUNTER — MEDICAL CORRESPONDENCE (OUTPATIENT)
Dept: HEALTH INFORMATION MANAGEMENT | Facility: CLINIC | Age: 33
End: 2023-05-16

## 2023-05-16 DIAGNOSIS — M25.561 ACUTE PAIN OF RIGHT KNEE: Primary | ICD-10-CM

## 2023-05-16 PROCEDURE — 99204 OFFICE O/P NEW MOD 45 MIN: CPT | Performed by: FAMILY MEDICINE

## 2023-05-16 RX ORDER — PREDNISONE 20 MG/1
40 TABLET ORAL DAILY
Qty: 10 TABLET | Refills: 0 | Status: SHIPPED | OUTPATIENT
Start: 2023-05-16 | End: 2023-05-21

## 2023-05-16 ASSESSMENT — PAIN SCALES - GENERAL: PAINLEVEL: SEVERE PAIN (7)

## 2023-05-16 NOTE — LETTER
5/16/2023         RE: Hilaria Bonner  [address]  [facility] [address]        Dear Colleague,    Thank you for referring your patient, Hilaria Bonner, to the [facility] [place]. Please see a copy of my visit note below.      HISTORY OF PRESENT ILLNESS  Ms. Bonner is a pleasant 32 year old female who presents to clinic today with right knee pain.  Hilaria has been experiencing worsening right knee pain for the past month or so.  No single inciting event that she can recall.  She does have right knee pain that is generalized and longstanding, although was tolerable before this.  She did have a cartilage restoration surgery about 20 years ago.  She points to medial and anterior aspect of her knee.  This is worse with walking.        Additional history: as documented    MEDICAL HISTORY  Patient Active Problem List   Diagnosis     Vitamin D deficiency     Encounter for smoking cessation counseling     Menorrhagia with irregular cycle     Pulmonary embolism with infarction (H)     Secondary hyperparathyroidism, not elsewhere classified (H)     Paresthesias     Hemorrhoids, unspecified hemorrhoid type     Chronic inflammatory demyelinating polyneuropathy (H)     S/P laparoscopic sleeve gastrectomy     Morbid obesity (H)     Moderate major depression (H)     Wernicke-Korsakoff syndrome (alcoholic) (H)     Cognitive and neurobehavioral dysfunction following brain injury (H)     Psychosis, unspecified psychosis type (H)     MELODY (generalized anxiety disorder)     Medical cannabis use     Tobacco use     Compression fracture of T9 vertebra with routine healing, subsequent encounter     Benign essential hypertension     History of pulmonary embolism     KENDALL (obstructive sleep apnea)- mild (AHI 6)     Type 2 diabetes mellitus with hyperglycemia, without long-term current use of insulin (H)     Vitamin B12 deficiency (non anemic)     Chronic anticoagulation     Elevated LFTs      Acute kidney injury (H)     Elevated lactic acid level     Gastrointestinal hemorrhage, unspecified gastrointestinal hemorrhage type     Hypotension due to hypovolemia     Cervical high risk HPV (human papillomavirus) test positive     Urinary incontinence     Decubitus ulcer of left hip     Pancreatic insufficiency       Current Outpatient Medications   Medication Sig Dispense Refill     ammonium lactate (AMLACTIN) 12 % external cream APPLY TOPICALLY TO THE AFFECTED AREA TWICE DAILY 385 g 0     apixaban ANTICOAGULANT (ELIQUIS) 5 MG tablet Take 1 tablet (5 mg) by mouth 2 times daily 60 tablet 3     blood glucose (NO BRAND SPECIFIED) lancets standard Use to test blood sugar 2 times daily or as directed. 100 lancet 3     blood glucose calibration (NO BRAND SPECIFIED) solution To accompany: Blood Glucose Monitor Brands: per insurance. 1 each 0     blood glucose monitoring (NO BRAND SPECIFIED) meter device kit Use to test blood sugar 1 times daily or as directed. Preferred blood glucose meter OR supplies to accompany: Blood Glucose Monitor Brands: per insurance. 1 kit 0     Calcium Citrate-Vitamin D 500-10 MG-MCG CHEW Take 1 chew tab by mouth 3 times daily 90 tablet 1     ferrous sulfate (FEROSUL) 325 (65 Fe) MG tablet Take 1 tablet (325 mg) by mouth every 48 hours 30 tablet 5     folic acid (FOLVITE) 1 MG tablet Take 1 tablet (1 mg) by mouth daily 90 tablet 3     hydrOXYzine (ATARAX) 25 MG tablet Take 1-2 tablets (25-50 mg) by mouth every 6 hours as needed for anxiety 180 tablet 1     ketoconazole (NIZORAL) 2 % external cream Apply topically daily To hands/feet 60 g 11     metFORMIN (GLUCOPHAGE XR) 500 MG 24 hr tablet Take 1 tablet (500 mg) by mouth daily (with dinner) 90 tablet 1     multivitamin (CENTRUM) chewable tablet Take 1 tablet by mouth daily 90 tablet 3     OLANZapine (ZYPREXA) 10 MG tablet Take 1 tablet (10 mg) by mouth At Bedtime 30 tablet 3     OLANZapine (ZYPREXA) 2.5 MG tablet Take 1 tablet (2.5 mg)  by mouth 2 times daily as needed (Anxiety, agitation or psychosis) 60 tablet 1     omeprazole (PRILOSEC) 20 MG DR capsule Take 1 capsule (20 mg) by mouth daily 90 capsule 0     polyethylene glycol (MIRALAX) 17 GM/Dose powder Take 17 g by mouth daily       potassium chloride ER (K-TAB/KLOR-CON) 10 MEQ CR tablet TAKE 2 TABLETS(20 MEQ) BY MOUTH TWICE DAILY 120 tablet 4     prazosin (MINIPRESS) 1 MG capsule TAKE 2 CAPSULES(2 MG) BY MOUTH AT BEDTIME 60 capsule 2     Pregabalin (LYRICA) 200 MG capsule TAKE 1 CAPSULE(200 MG) BY MOUTH THREE TIMES DAILY 270 capsule 0     thiamine (B-1) 100 MG tablet Take 1 tablet (100 mg) by mouth 3 times daily       topiramate (TOPAMAX) 50 MG tablet Take 50 mg by mouth 2 times daily       traZODone (DESYREL) 50 MG tablet Take 1 tablet (50 mg) by mouth nightly as needed for sleep 30 tablet 4     venlafaxine (EFFEXOR XR) 150 MG 24 hr capsule Take 1 capsule (150 mg) by mouth daily 90 capsule 1     venlafaxine (EFFEXOR-ER) 75 MG 24 hr tablet Take 1 tablet (75 mg) by mouth daily Take along with a 150 mg capsule for a total morning dose of 225 mg. 30 tablet 3     vitamin B complex with vitamin C (VITAMIN  B COMPLEX) tablet Take 1 tablet by mouth daily       vitamin D3 (CHOLECALCIFEROL) 50 mcg (2000 units) tablet Take 1 tablet (50 mcg) by mouth daily 30 tablet 5       No Known Allergies    Family History   Problem Relation Age of Onset     Pulmonary Embolism Mother      Cirrhosis Mother      Diabetes Father      Hypertension Father      Breast Cancer Sister 26        surgery only     Cancer Sister      Breast Cancer Sister      Mental Illness Sister         Bipolar     Coronary Artery Disease Other         paternal aunt     Thyroid Disease Other         neice     Coronary Artery Disease Other      Cerebrovascular Disease Other      Thyroid Disease Other      Liver Disease No family hx of      Colon Cancer No family hx of      Skin Cancer No family hx of      Melanoma No family hx of         Additional medical/Social/Surgical histories reviewed in [facility] and updated as appropriate.        PHYSICAL EXAM  General  - normal appearance, in no obvious distress  Musculoskeletal - right knee  - stance: mildly antalgic gait  - inspection: trace effusion  - palpation: medial joint line tenderness  - ROM: 120 degrees flexion, 0 degrees extension, painful active ROM  - strength: 5/5 in flexion, 5/5 in extension  - special tests:  (-) Maren  (-) varus at 0 and 30 degrees flexion  (-) valgus at 0 and 30 degrees flexion  Neuro  - no sensory or motor deficit, grossly normal coordination, normal muscle tone              ASSESSMENT & PLAN  Ms. Bonner is a 32 year old female who presents to clinic today with right knee pain.    I reviewed her x-ray in the room with her, this does reveal moderate osteoarthritis of the medial compartment and mild arthritis of the patellofemoral compartment.  There are also intra articular bodies seen.    We had a good discussion centering around these findings and their implications.  It does seem that her osteoarthritis is likely causing the bulk of her pain.  We discussed physical therapy, bracing, and injection based treatments.  I am referring her to PT, we also gave her a brace today.  In addition to this I am prescribing her prednisone, she cannot take NSAIDs as she is on Eliquis.    We should follow-up in 3 to 4 months, we could consider an injection at that time if her pain is not improved.    It was a pleasure seeing Hilaria today.    Yash Moseley DO, [facility]  Primary Care Sports Medicine      This note was constructed using Dragon dictation software, please excuse any minor errors in spelling, grammar, or syntax.      Again, thank you for allowing me to participate in the care of your patient.        Sincerely,        Yash Moseley DO

## 2023-05-16 NOTE — TELEPHONE ENCOUNTER
[facility] forms signed by provider will be faxed to [phone]      Lola Wick Facilitator

## 2023-05-16 NOTE — PROGRESS NOTES
HISTORY OF PRESENT ILLNESS  Ms. Bonner is a pleasant 32 year old female who presents to clinic today with right knee pain.  Hilaria has been experiencing worsening right knee pain for the past month or so.  No single inciting event that she can recall.  She does have right knee pain that is generalized and longstanding, although was tolerable before this.  She did have a cartilage restoration surgery about 20 years ago.  She points to medial and anterior aspect of her knee.  This is worse with walking.        Additional history: as documented    MEDICAL HISTORY  Patient Active Problem List   Diagnosis     Vitamin D deficiency     Encounter for smoking cessation counseling     Menorrhagia with irregular cycle     Pulmonary embolism with infarction (H)     Secondary hyperparathyroidism, not elsewhere classified (H)     Paresthesias     Hemorrhoids, unspecified hemorrhoid type     Chronic inflammatory demyelinating polyneuropathy (H)     S/P laparoscopic sleeve gastrectomy     Morbid obesity (H)     Moderate major depression (H)     Wernicke-Korsakoff syndrome (alcoholic) (H)     Cognitive and neurobehavioral dysfunction following brain injury (H)     Psychosis, unspecified psychosis type (H)     MELODY (generalized anxiety disorder)     Medical cannabis use     Tobacco use     Compression fracture of T9 vertebra with routine healing, subsequent encounter     Benign essential hypertension     History of pulmonary embolism     KENDALL (obstructive sleep apnea)- mild (AHI 6)     Type 2 diabetes mellitus with hyperglycemia, without long-term current use of insulin (H)     Vitamin B12 deficiency (non anemic)     Chronic anticoagulation     Elevated LFTs     Acute kidney injury (H)     Elevated lactic acid level     Gastrointestinal hemorrhage, unspecified gastrointestinal hemorrhage type     Hypotension due to hypovolemia     Cervical high risk HPV (human papillomavirus) test positive     Urinary incontinence     Decubitus  ulcer of left hip     Pancreatic insufficiency       Current Outpatient Medications   Medication Sig Dispense Refill     ammonium lactate (AMLACTIN) 12 % external cream APPLY TOPICALLY TO THE AFFECTED AREA TWICE DAILY 385 g 0     apixaban ANTICOAGULANT (ELIQUIS) 5 MG tablet Take 1 tablet (5 mg) by mouth 2 times daily 60 tablet 3     blood glucose (NO BRAND SPECIFIED) lancets standard Use to test blood sugar 2 times daily or as directed. 100 lancet 3     blood glucose calibration (NO BRAND SPECIFIED) solution To accompany: Blood Glucose Monitor Brands: per insurance. 1 each 0     blood glucose monitoring (NO BRAND SPECIFIED) meter device kit Use to test blood sugar 1 times daily or as directed. Preferred blood glucose meter OR supplies to accompany: Blood Glucose Monitor Brands: per insurance. 1 kit 0     Calcium Citrate-Vitamin D 500-10 MG-MCG CHEW Take 1 chew tab by mouth 3 times daily 90 tablet 1     ferrous sulfate (FEROSUL) 325 (65 Fe) MG tablet Take 1 tablet (325 mg) by mouth every 48 hours 30 tablet 5     folic acid (FOLVITE) 1 MG tablet Take 1 tablet (1 mg) by mouth daily 90 tablet 3     hydrOXYzine (ATARAX) 25 MG tablet Take 1-2 tablets (25-50 mg) by mouth every 6 hours as needed for anxiety 180 tablet 1     ketoconazole (NIZORAL) 2 % external cream Apply topically daily To hands/feet 60 g 11     metFORMIN (GLUCOPHAGE XR) 500 MG 24 hr tablet Take 1 tablet (500 mg) by mouth daily (with dinner) 90 tablet 1     multivitamin (CENTRUM) chewable tablet Take 1 tablet by mouth daily 90 tablet 3     OLANZapine (ZYPREXA) 10 MG tablet Take 1 tablet (10 mg) by mouth At Bedtime 30 tablet 3     OLANZapine (ZYPREXA) 2.5 MG tablet Take 1 tablet (2.5 mg) by mouth 2 times daily as needed (Anxiety, agitation or psychosis) 60 tablet 1     omeprazole (PRILOSEC) 20 MG DR capsule Take 1 capsule (20 mg) by mouth daily 90 capsule 0     polyethylene glycol (MIRALAX) 17 GM/Dose powder Take 17 g by mouth daily       potassium chloride  ER (K-TAB/KLOR-CON) 10 MEQ CR tablet TAKE 2 TABLETS(20 MEQ) BY MOUTH TWICE DAILY 120 tablet 4     prazosin (MINIPRESS) 1 MG capsule TAKE 2 CAPSULES(2 MG) BY MOUTH AT BEDTIME 60 capsule 2     Pregabalin (LYRICA) 200 MG capsule TAKE 1 CAPSULE(200 MG) BY MOUTH THREE TIMES DAILY 270 capsule 0     thiamine (B-1) 100 MG tablet Take 1 tablet (100 mg) by mouth 3 times daily       topiramate (TOPAMAX) 50 MG tablet Take 50 mg by mouth 2 times daily       traZODone (DESYREL) 50 MG tablet Take 1 tablet (50 mg) by mouth nightly as needed for sleep 30 tablet 4     venlafaxine (EFFEXOR XR) 150 MG 24 hr capsule Take 1 capsule (150 mg) by mouth daily 90 capsule 1     venlafaxine (EFFEXOR-ER) 75 MG 24 hr tablet Take 1 tablet (75 mg) by mouth daily Take along with a 150 mg capsule for a total morning dose of 225 mg. 30 tablet 3     vitamin B complex with vitamin C (VITAMIN  B COMPLEX) tablet Take 1 tablet by mouth daily       vitamin D3 (CHOLECALCIFEROL) 50 mcg (2000 units) tablet Take 1 tablet (50 mcg) by mouth daily 30 tablet 5       No Known Allergies    Family History   Problem Relation Age of Onset     Pulmonary Embolism Mother      Cirrhosis Mother      Diabetes Father      Hypertension Father      Breast Cancer Sister 26        surgery only     Cancer Sister      Breast Cancer Sister      Mental Illness Sister         Bipolar     Coronary Artery Disease Other         paternal aunt     Thyroid Disease Other         neice     Coronary Artery Disease Other      Cerebrovascular Disease Other      Thyroid Disease Other      Liver Disease No family hx of      Colon Cancer No family hx of      Skin Cancer No family hx of      Melanoma No family hx of        Additional medical/Social/Surgical histories reviewed in [facility] and updated as appropriate.        PHYSICAL EXAM  General  - normal appearance, in no obvious distress  Musculoskeletal - right knee  - stance: mildly antalgic gait  - inspection: trace effusion  - palpation: medial  joint line tenderness  - ROM: 120 degrees flexion, 0 degrees extension, painful active ROM  - strength: 5/5 in flexion, 5/5 in extension  - special tests:  (-) Maren  (-) varus at 0 and 30 degrees flexion  (-) valgus at 0 and 30 degrees flexion  Neuro  - no sensory or motor deficit, grossly normal coordination, normal muscle tone              ASSESSMENT & PLAN  Ms. Bonner is a 32 year old female who presents to clinic today with right knee pain.    I reviewed her x-ray in the room with her, this does reveal moderate osteoarthritis of the medial compartment and mild arthritis of the patellofemoral compartment.  There are also intra articular bodies seen.    We had a good discussion centering around these findings and their implications.  It does seem that her osteoarthritis is likely causing the bulk of her pain.  We discussed physical therapy, bracing, and injection based treatments.  I am referring her to PT, we also gave her a brace today.  In addition to this I am prescribing her prednisone, she cannot take NSAIDs as she is on Eliquis.    We should follow-up in 3 to 4 months, we could consider an injection at that time if her pain is not improved.    It was a pleasure seeing Hilaria today.    Yash Moseley DO, [facility]  Primary Care Sports Medicine      This note was constructed using Dragon dictation software, please excuse any minor errors in spelling, grammar, or syntax.

## 2023-05-16 NOTE — TELEPHONE ENCOUNTER
Forms/Letter Request    Type of form/letter: [facility]    Have you been seen for this request: N/A    Do we have the form/letter: Yes: Will place forms on providers desk for review/signature    When is form/letter needed by: asap    How would you like the form/letter returned: Fax : [phone]

## 2023-05-19 ENCOUNTER — MEDICAL CORRESPONDENCE (OUTPATIENT)
Dept: HEALTH INFORMATION MANAGEMENT | Facility: CLINIC | Age: 33
End: 2023-05-19
Payer: MEDICARE

## 2023-05-19 ENCOUNTER — TELEPHONE (OUTPATIENT)
Dept: FAMILY MEDICINE | Facility: CLINIC | Age: 33
End: 2023-05-19
Payer: MEDICARE

## 2023-05-19 NOTE — TELEPHONE ENCOUNTER
Forms/Letter Request    Type of form/letter: [facility]     Have you been seen for this request: N/A    Do we have the form/letter: Yes: Will place form in providers bin for review/signature    When is form/letter needed by: asap    How would you like the form/letter returned: Fax : [phone]

## 2023-05-25 ENCOUNTER — VIRTUAL VISIT (OUTPATIENT)
Dept: NEUROLOGY | Facility: CLINIC | Age: 33
End: 2023-05-25
Payer: MEDICARE

## 2023-05-25 DIAGNOSIS — F29 PSYCHOSIS, UNSPECIFIED PSYCHOSIS TYPE (H): ICD-10-CM

## 2023-05-25 DIAGNOSIS — F06.30 MOOD DISORDER IN CONDITIONS CLASSIFIED ELSEWHERE: ICD-10-CM

## 2023-05-25 DIAGNOSIS — F41.1 GAD (GENERALIZED ANXIETY DISORDER): ICD-10-CM

## 2023-05-25 PROCEDURE — 99443 PR PHYSICIAN TELEPHONE EVALUATION 21-30 MIN: CPT | Mod: 95 | Performed by: PSYCHIATRY & NEUROLOGY

## 2023-05-25 RX ORDER — TRAZODONE HYDROCHLORIDE 50 MG/1
50 TABLET, FILM COATED ORAL
Qty: 30 TABLET | Refills: 4 | Status: SHIPPED | OUTPATIENT
Start: 2023-05-25

## 2023-05-25 RX ORDER — VENLAFAXINE HYDROCHLORIDE 150 MG/1
150 CAPSULE, EXTENDED RELEASE ORAL DAILY
Qty: 90 CAPSULE | Refills: 1 | Status: SHIPPED | OUTPATIENT
Start: 2023-05-25

## 2023-05-25 RX ORDER — PRAZOSIN HYDROCHLORIDE 1 MG/1
CAPSULE ORAL
Qty: 60 CAPSULE | Refills: 2 | Status: SHIPPED | OUTPATIENT
Start: 2023-05-25

## 2023-05-25 RX ORDER — HYDROXYZINE HYDROCHLORIDE 25 MG/1
25-50 TABLET, FILM COATED ORAL EVERY 6 HOURS PRN
Qty: 180 TABLET | Refills: 1 | Status: SHIPPED | OUTPATIENT
Start: 2023-05-25

## 2023-05-25 RX ORDER — OLANZAPINE 10 MG/1
10 TABLET ORAL AT BEDTIME
Qty: 30 TABLET | Refills: 3 | Status: SHIPPED | OUTPATIENT
Start: 2023-05-25

## 2023-05-25 RX ORDER — OLANZAPINE 2.5 MG/1
2.5 TABLET, FILM COATED ORAL 2 TIMES DAILY PRN
Qty: 60 TABLET | Refills: 1 | Status: SHIPPED | OUTPATIENT
Start: 2023-05-25

## 2023-05-25 NOTE — NURSING NOTE
Is the patient currently in the state of [place]? YES    Visit mode:VIDEO    If the visit is dropped, the patient can be reconnected by: TELEPHONE VISIT: Phone number: [phone]    Will anyone else be joining the visit? NO      How would you like to obtain your AVS? MyChart    Are changes needed to the allergy or medication list? NO    Reason for visit: Video Visit (Follow-up )

## 2023-05-25 NOTE — TELEPHONE ENCOUNTER
Signed Prescriptions:                        Disp   Refills    methocarbamol (ROBAXIN) 500 MG tablet      75 tab*1        Sig: Take 1-2 tablets (500-1,000 mg) by mouth 3 times           daily as needed for muscle spasms  Authorizing Provider: GLO LOZA    Agree with plan as laid out by nursing.     RG Puri [facility]      25-May-2023 25-May-2023 12:07

## 2023-05-25 NOTE — PATIENT INSTRUCTIONS
The patient was a vague historian but was a bit brighter.  She knew apparently has been compliant with the medication and is sleeping better.  She is tolerating the medication.  I will make no changes and the patient should return to this clinic in 3 to 4 months for a medication check.
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
## 2023-05-31 ENCOUNTER — TELEPHONE (OUTPATIENT)
Dept: FAMILY MEDICINE | Facility: CLINIC | Age: 33
End: 2023-05-31
Payer: MEDICARE

## 2023-05-31 ENCOUNTER — MEDICAL CORRESPONDENCE (OUTPATIENT)
Dept: HEALTH INFORMATION MANAGEMENT | Facility: CLINIC | Age: 33
End: 2023-05-31
Payer: MEDICARE

## 2023-05-31 NOTE — TELEPHONE ENCOUNTER
Forms/Letter Request    Type of form/letter: [facility] 2 separate orders order# [id number] & [facility] plan of care    Have you been seen for this request: N/A    Do we have the form/letter: Yes: placed in providers forms basket for review    When is form/letter needed by: ASAP    How would you like the form/letter returned: Fax  [phone]

## 2023-05-31 NOTE — TELEPHONE ENCOUNTER
Forms/Letter Request    Type of form/letter: [facility]    Have you been seen for this request: N/A    Do we have the form/letter: Yes: Will place forms on providers desk for review/signature    When is form/letter needed by: asap    How would you like the form/letter returned: Fax : [phone]

## 2023-06-01 ENCOUNTER — MEDICAL CORRESPONDENCE (OUTPATIENT)
Dept: HEALTH INFORMATION MANAGEMENT | Facility: CLINIC | Age: 33
End: 2023-06-01
Payer: MEDICARE

## 2023-06-01 ENCOUNTER — TELEPHONE (OUTPATIENT)
Dept: FAMILY MEDICINE | Facility: CLINIC | Age: 33
End: 2023-06-01
Payer: MEDICARE

## 2023-06-01 NOTE — TELEPHONE ENCOUNTER
[facility] and [facility] the [facility]    Occupational Therapy, Physical Therapy and Speech Therapy.

## 2023-06-01 NOTE — TELEPHONE ENCOUNTER
RN called home care and spoke with triage nurse Shanna to relay provider approval of orders. Shanna verbalized good understanding. Requesting orders to be faxed as well.    TEN De La Cruz  [facility] Primary Care Triage

## 2023-06-01 NOTE — TELEPHONE ENCOUNTER
Critical access hospital called.  Requesting orders for out patient - Occupational Therapy, Physical Therapy and Speech Therapy.  Orders can be faxed to [phone]

## 2023-06-05 NOTE — PROGRESS NOTES
This is a recent snapshot of the patient's [facility] medical record.  For current drug dose and complete information and questions, call [phone] or In Basket pool, [facility] home infusion ([id number])  CSN Number:  [id number]

## 2023-06-14 NOTE — PROGRESS NOTES
"Hilaria Bonner is a 29 year old female who is being evaluated via a billable telephone visit.      The patient has been notified of following:     \"This telephone visit will be conducted via a call between you and Jasmin Prince PsyD LP. We have found that certain health care needs can be provided without the need for an in-person session.  This service lets us provide the care you need with a phone conversation.       If during the course of the call I feel a telephone visit is not appropriate, you will not be charged for this service.\"      Reviewed that patient is in a quiet private place, no recording without permission, all apps and notifications of any devices are turned off. Began the session by talking about the risks and benefits of telehealth, what to do if there is a break in the connection, reviewed the safety protocol for during and after sessions. The purpose of using telehealth for this session was due to the COVID-19 pandemic and was to reduce the spread of the disease and protect both the clinician and client.             Patient has given verbal consent for telephone visit? YES    Phone call contact time  Call Started at 11:00 AM  Call Ended at 12:00 PM  Total 60 minutes     Pain Diagnoses per pain provider:   Chronic inflammatory demyelinating polyneuropathy (H)     DATA: During today's visit you reported the following: Your pain is the same - no changes. Your mood is not great - worried about depression worsening. Your activity level is about the same - continuing to walk about 40 minutes twice daily. Your stress level is 'creeping up there'. Your sleep is still disrupted by night sweats about 3 times, 4-5 times per night from pain - perhaps with overlap of two 1-2 times per night. You reported engaging in self-care for your pain throughout the day.    You identified that you would like to focus on the following or had questions regarding the following issues or concerns, and we discussed the " following:   - started medical cannabis today - concerned about feeling dizzy and side effects of racing heart beat; also concerned about cost  - ER visit for pancreatitis since last visit - report having a drink with friend for their birthday caused the pain  - not sure about whether return to work is possible, have applied for social security disability - clarified that since I do not provide psychological diagnosis, I would not be best person to assist with any paperwork for SSDI application  - discussed stress management - primarily this takes form of worry and could be small, such as 'what should I cook for dinner' to ongoing medical issues  - sympathetic nervous system response to pain  - window of tolerance and using self-soothing activities to better manage stress  - tapping technique for anxiety/worry thoughts  - continue to work with providers to determine potential cause of night sweats    ASSESSMENT: Stress level and pain are connected and having a negative impact on each other.    PLAN:   Your next appointment is scheduled for 12/21 at  2:00 PM.  Assignment/Objectives /interventions for next session:   - ask Dr Madrigal about whether you might benefit from a referral to  adult post-COVID clinic   - keep walking daily, utilize self-soothing activities daily    Telephone-Visit Details    Type of service:  Telephone Visit    Originating Location (pt. Location): Home    Distant Location (provider location):  [place] [facility]     Mode of Communication:  Telephone    I have reviewed the note as documented above.  This accurately captures the substance of my conversation with the patient.    Jasmin Prince PsyD LP  Licensed Psychologist  Outpatient Clinic Therapist  [facility]    Disclaimer: This note consists of symbols derived from keyboarding, dictation and/or voice recognition software. As a result, there may be errors in the script that have gone undetected. Please  consider this when interpreting information found in this chart.           Nasalis-Muscle-Based Myocutaneous Island Pedicle Flap Text: Using a #15 blade, an incision was made around the donor flap to the level of the nasalis muscle. Wide lateral undermining was then performed in both the subcutaneous plane above the nasalis muscle, and in a submuscular plane just above periosteum. This allowed the formation of a free nasalis muscle axial pedicle (based on the angular artery) which was still attached to the actual cutaneous flap, increasing its mobility and vascular viability. Hemostasis was obtained with pinpoint electrocoagulation. The flap was mobilized into position and the pivotal anchor points positioned and stabilized with buried interrupted sutures. Subcutaneous and dermal tissues were closed in a multilayered fashion with sutures. Tissue redundancies were excised, and the epidermal edges were apposed without significant tension and sutured with sutures.

## 2023-06-15 ENCOUNTER — TELEPHONE (OUTPATIENT)
Dept: FAMILY MEDICINE | Facility: CLINIC | Age: 33
End: 2023-06-15
Payer: MEDICARE

## 2023-06-15 NOTE — TELEPHONE ENCOUNTER
Forms/Letter Request    Type of form/letter: [facility]     Have you been seen for this request: N/A    Do we have the form/letter: Yes: Will place form on providers desk for review/signature    When is form/letter needed by: asap    How would you like the form/letter returned: Fax : [phone]

## 2023-06-16 ENCOUNTER — MEDICAL CORRESPONDENCE (OUTPATIENT)
Dept: HEALTH INFORMATION MANAGEMENT | Facility: CLINIC | Age: 33
End: 2023-06-16
Payer: MEDICARE

## 2023-06-20 ENCOUNTER — MEDICAL CORRESPONDENCE (OUTPATIENT)
Dept: HEALTH INFORMATION MANAGEMENT | Facility: CLINIC | Age: 33
End: 2023-06-20

## 2023-06-20 ENCOUNTER — TELEPHONE (OUTPATIENT)
Dept: FAMILY MEDICINE | Facility: CLINIC | Age: 33
End: 2023-06-20

## 2023-06-20 NOTE — TELEPHONE ENCOUNTER
Forms/Letter Request    Type of form/letter: [facility]    Have you been seen for this request: N/A    Do we have the form/letter: Yes: Will place form on providers desk for review/signature    When is form/letter needed by: asap    How would you like the form/letter returned: Fax : [phone]

## 2023-06-21 ENCOUNTER — MEDICAL CORRESPONDENCE (OUTPATIENT)
Dept: HEALTH INFORMATION MANAGEMENT | Facility: CLINIC | Age: 33
End: 2023-06-21

## 2023-06-21 ENCOUNTER — TELEPHONE (OUTPATIENT)
Dept: FAMILY MEDICINE | Facility: CLINIC | Age: 33
End: 2023-06-21

## 2023-06-21 NOTE — TELEPHONE ENCOUNTER
Forms/Letter Request    Type of form/letter: [facility]    Have you been seen for this request: N/A    Do we have the form/letter: Yes: Will place form on the providers desk for review/signature    When is form/letter needed by: asap    How would you like the form/letter returned: Fax : [phone]

## 2023-07-05 ENCOUNTER — MEDICAL CORRESPONDENCE (OUTPATIENT)
Dept: HEALTH INFORMATION MANAGEMENT | Facility: CLINIC | Age: 33
End: 2023-07-05

## 2023-07-05 ENCOUNTER — TELEPHONE (OUTPATIENT)
Dept: FAMILY MEDICINE | Facility: CLINIC | Age: 33
End: 2023-07-05

## 2023-07-05 ENCOUNTER — DOCUMENTATION ONLY (OUTPATIENT)
Dept: PSYCHOLOGY | Facility: CLINIC | Age: 33
End: 2023-07-05
Payer: MEDICARE

## 2023-07-05 PROCEDURE — 90785 PSYTX COMPLEX INTERACTIVE: CPT | Performed by: SOCIAL WORKER

## 2023-07-05 NOTE — PROGRESS NOTES
Discharge Summary  Multiple Sessions    Client Name: Hilaria Bonner MRN#: [id number] YOB: 1990    Intake / Discharge Date:7/25/2022/7/05/2023    DSM5 Diagnoses: (Sustained by DSM5 Criteria Listed Above)  Diagnoses: 296.32 (F33.1) Major Depressive Disorder, Recurrent Episode, Moderate _ and With anxious distress, 300.02 (F41.1) Generalized Anxiety Disorder or Adjustment Disorders  309.28 (F43.23) With mixed anxiety and depressed mood ;Grief reaction [F43.21]  Psychosocial & Contextual Factors: Grief, depression, medical issues.   WHODAS 2.0 (12 item) Score: No completed  PROMIS-10: 10 as of 4/11/20223    Presenting Concern: Initially was referred for Grief reaction, depression with medical issues. Patient was seen 5 times between 7/25 and today 7/05/2023. Writer has made attempts to connect with the patient with no success. Writer has made a referral to [facility] door. Writer has no update about patient's current state or whether she followed through this referral.     Reason for Discharge:  Client did not return: Multiple no shows. Attempts to reach her were made. None of her 2 phones works. Last seen on 4/11/2023. Total of sessions: 5.    Disposition at Time of Last Encounter:   Comments: No update of patient's current state. Was last seen on 4/11/2023. Multiple no shows. No communication with writer for accomodation.     Risk Management:   Client denies a history of suicidal ideation, suicide attempts, self-injurious behavior, homicidal ideation, homicidal behavior and and other safety concerns  Recommended that patient call 911 or go to the local ED should there be a change in any of these risk factors.  National Suicide prvention hot line: 988    Referred To: Might call [phone] and  reschedule with a therapist after 6 months from today. Might  also choose to go to a community providers such as : [facility]:  ([phone]); or [facility]: Ph:  [phone]; and   mental health services:  [phone]     [facility]   7/5/2023

## 2023-07-05 NOTE — TELEPHONE ENCOUNTER
Forms/Letter Request    Type of form/letter: [facility]     Have you been seen for this request: N/A    Do we have the form/letter: Yes: Will place form on providers desk for review/signature    When is form/letter needed by: asap    How would you like the form/letter returned: Fax : [phone]

## 2023-07-11 ENCOUNTER — PATIENT OUTREACH (OUTPATIENT)
Dept: FAMILY MEDICINE | Facility: CLINIC | Age: 33
End: 2023-07-11
Payer: MEDICARE

## 2023-07-11 PROBLEM — R87.810 CERVICAL HIGH RISK HPV (HUMAN PAPILLOMAVIRUS) TEST POSITIVE: Status: ACTIVE | Noted: 2022-07-26

## 2023-07-20 NOTE — TELEPHONE ENCOUNTER
Reason for Call:  INR    Who is calling?  [facility]    Phone number:  [phone]    Name of caller: Moon    INR Value:  2.1    Are there any other concerns:  No    Can we leave a detailed message on this number? YES      Call taken on 7/23/2019 at 11:49 AM by Reno Adrian             Chief Complaint   Patient presents with    Anxiety     C/o increasing anxiety and heart palpitations       1. Have you been to the ER, urgent care clinic since your last visit? Hospitalized since your last visit? No    2. Have you seen or consulted any other health care providers outside of the [address] [place] [address] since your last visit? Include any pap smears or colon screening.  No     The patient, Grecia Espniosa, identity was verified by name and

## 2023-07-29 ENCOUNTER — HEALTH MAINTENANCE LETTER (OUTPATIENT)
Age: 33
End: 2023-07-29

## 2023-07-31 NOTE — TELEPHONE ENCOUNTER
Records Requested     July 31, 2023 9:35 AM   [facility]   Facility  [facility]   Outcome 9:40am Sent request for imaging to be pushed to PACS. -JAY Sheikh on 8/21/2023 at 9:08 AM Imaging resolved into PACS. -JAY     RECORDS RECEIVED FROM: Care Everywhere    REASON FOR VISIT: Wernicke-Korsakoff syndrome (alcoholic)  Memory loss   Date of Appt: 10/4/23 8:30am    NOTES (FOR ALL VISITS) STATUS DETAILS   OFFICE NOTE from referring provider Internal 4/18/23 , 3/27/23 (Phone), 3/21/23, 2/15/22 Crissy Madrigal PA-C @[facility]     OFFICE NOTE from other specialist Internal 5/25/23, 3/23/23 Warren Torres MD @[facility]    2/1/23, 1/25/23, 5/9/22, 8/4/21 [facility]    1/24/23 Kylah Moseley, RG CNP @[facility]     9[facility]16/22 [facility]    10/12/21 Karie Goodman APRN CNP @[facility] PM     DISCHARGE SUMMARY from hospital Care Everywhere 12/20/22-1/11/23 Bbeo Esqueda DO  @[facility]    11/27/22-12/20/22 Víctor Del Real MD  @ [facility]     DISCHARGE REPORT from the ER Care Everywhere 4/28/23 Ry Perez MD @[facility] ED     MEDICATION LIST Internal    IMAGING  (FOR ALL VISITS)     MRI (HEAD, NECK, SPINE) PACS [facility]  12/2/22 MR Head Brain    [facility]  9/29/21  MR Thoracic Spine     CT (HEAD, NECK, SPINE) PACS [facility]  11/27/22 CT Cervical Spine  11/27/22 CT Head Brain  8/14/22 CT Head Brain

## 2023-08-29 NOTE — PROGRESS NOTES
Clinic Care Coordination Contact    Clinic Care Coordination Contact  OUTREACH    Referral Information:  Referral Source: Behavioral Health Clinician    Reason for Referral: Financial Support    Financial Support: Housing    Clinical Staff have discussed the Care Coordination Referral with the patient and/or caregiver: Yes          Primary Diagnosis: Psychosocial    Chief Complaint   Patient presents with     Clinic Care Coordination - Initial     Albino [place]        [place] Utilization: [facility]; [facility]; [facility] and [facility]  Clinic Utilization  Difficulty keeping appointments:: No  Compliance Concerns: No  No-Show Concerns: No  No PCP office visit in Past Year: No  Utilization    Hospital Admissions  0             ED Visits  1             No Show Count (past year)  12                Current as of: 10/19/2021 12:01 PM              Clinical Concerns:  Current Medical Concerns:    Patient Active Problem List   Diagnosis     Vitamin D deficiency     Elevated parathyroid hormone     Encounter for smoking cessation counseling     Menorrhagia with irregular cycle     Pulmonary embolism with infarction (H)     Cardiac arrest, cause unspecified (H)     VT (ventricular tachycardia) (H)     Secondary hyperparathyroidism, not elsewhere classified (H)     Paresthesias     Hemorrhoids, unspecified hemorrhoid type     Altered mental status     Chronic inflammatory demyelinating polyneuropathy (H)     S/P laparoscopic sleeve gastrectomy     Morbid obesity (H)     Moderate major depression (H)     Alcohol abuse     Cognitive and neurobehavioral dysfunction following brain injury (H)     Psychosis (H)     MELODY (generalized anxiety disorder)     Acute massive pulmonary embolism (H)     Medical cannabis use     Sleep disturbances     Tobacco use     Compression fracture of T9 vertebra with routine healing, subsequent encounter     HTN (hypertension)         Current Behavioral Concerns: MELODY; Moderate major  depression. Patient reports taking medications and seeing [facility]    Education Provided to patient: Introduced self and role   Pain  Pain (GOAL):: Yes  Type: Chronic (>3mo)  Location of chronic pain:: Hands and feet  Radiating: No  Progression: Unchanged  Description of pain: Burning, Electrical-like, Stabbing  Chronic pain severity:: 7  Limitation of routine activities due to chronic pain:: No  Alleviating Factors: Pain Medication  Health Maintenance Reviewed: Due/Overdue   Health Maintenance Due   Topic Date Due     ADVANCE CARE PLANNING  Never done     Pneumococcal Vaccine: Pediatrics (0 to 5 Years) and At-Risk Patients (6 to 64 Years) (1 of 2 - PPSV23) Never done     COVID-19 Vaccine (1) Never done     HEPATITIS C SCREENING  Never done     EYE EXAM  01/04/2019     URINE DRUG SCREEN  08/20/2021     INFLUENZA VACCINE (1) 09/01/2021     PREVENTIVE CARE VISIT  09/30/2021       Clinical Pathway: None    Medication Management:  Medication review status: Medications reviewed and no changes reported per patient.        Medications last reviewed by clinic staff on 10/12/2021     Functional Status:  Dependent ADLs:: Ambulation-walker, Bathing, Dressing, Grooming  Dependent IADLs:: Cleaning, Cooking, Laundry, Medication Management, Transportation, Shopping, Meal Preparation  Bed or wheelchair confined:: No  Mobility Status: Independent w/Device  Fallen 2 or more times in the past year?: Yes  Any fall with injury in the past year?: No    Living Situation:  Current living arrangement:: I live in a private home with family  Type of residence:: Apartment    Lifestyle & Psychosocial Needs: [facility] contacted patient. Introduced self and role. Discussed consult reason. Patient stated that she would like to move to a new home with her two children. She reports that she would like a three bedroom townhome or house if possible. Patient stated that she would like to move soon, but she was recently approved for disability and is waiting on  the disability check to come through so she knows how much she can afford. [facility] discussed housinglink.org website. Encouraged patient to review this website to find affordable housing options.     Social Determinants of Health     Tobacco Use: High Risk     Smoking Tobacco Use: Current Every Day Smoker     Smokeless Tobacco Use: Never Used   Alcohol Use:      Frequency of Alcohol Consumption:      Average Number of Drinks:      Frequency of Binge Drinking:    Financial Resource Strain: Low Risk      Difficulty of Paying Living Expenses: Not very hard   Food Insecurity: Food Insecurity Present     Worried About Running Out of Food in the Last Year: Sometimes true     Ran Out of Food in the Last Year: Sometimes true   Transportation Needs: No Transportation Needs     Lack of Transportation (Medical): No     Lack of Transportation (Non-Medical): No   Physical Activity:      Days of Exercise per Week:      Minutes of Exercise per Session:    Stress:      Feeling of Stress :    Social Connections:      Frequency of Communication with Friends and Family:      Frequency of Social Gatherings with Friends and Family:      Attends [employer] Services:      Active Member of Clubs or Organizations:      Attends Club or Organization Meetings:      Marital Status:    Intimate Partner Violence:      Fear of Current or Ex-Partner:      Emotionally Abused:      Physically Abused:      Sexually Abused:    Depression: At risk     PHQ-2 Score: 6   Housing Stability:      Unable to Pay for Housing in the Last Year:      Number of Places Lived in the Last Year:      Unstable Housing in the Last Year:      Diet:: Regular  Inadequate nutrition (GOAL):: No  Tube Feeding: No  Inadequate activity/exercise (GOAL):: No  Significant changes in sleep pattern (GOAL): No  Transportation means:: Family, Friend, Medical transport     [employer] or spiritual beliefs that impact treatment:: No  Mental health DX:: Yes  Mental health DX how managed::  Medication, BHC Services at Primary Care  Mental health management concern (GOAL):: No  Chemical Dependency Status: No Current Concerns  Informal Support system:: Children, Parent, Family        Resources and Interventions:  Current Resources:      Community Resources: [facility] Programs, Financial/Insurance, PCA (PCA- 14hrs/week)  Supplies used at home:: Compression Stockings  Equipment Currently Used at Home: walker, standard, shower chair  Employment Status: disabled         Advance Care Plan/Directive  Advanced Care Plans/Directives on file:: No    Referrals Placed: None     Goals:   Goals        General     Psychosocial (pt-stated)      Notes - Note edited  10/19/2021 12:42 PM by Albino Pratt BSW     Goal Statement: I would like to move  Date Goal set: 10/19/2021  Barriers: Unsure what disability payments will be each month; Wants a three bedroom townhome or house to rent instead of an apartment- not sure if this will be financially feasible  Strengths: Patient is a great self advocate; Patient is enrolled in Care Coordination   Date to Achieve By: 2/2022  Patient expressed understanding of goal: Yes  Action steps to achieve this goal:  1. I will wait to see what my disability payments will be  2. I will review living options within my price range on [employer] once I get my monthly disability payments figured out  3. I will tour living options that are suitable for me and my children  4. I will move in to a new place.              Patient/Caregiver understanding: Yes    Outreach Frequency: monthly  Future Appointments              Tomorrow BA LAB ONLY M [facility] Clinic [facility], [facility]    In 3 days SLEEP STUDY  4 M [facility] Sleep Center [facility]    In 1 week Radha Watt M, PT M [facility] Rehabilitation Services [place], [facility]    In 1 week Dana Harrison [facility] Mental Health & Addiction [place]  CLIN    In 2 weeks Radha Watt PT M [facility] Rehabilitation Services [place], [facility]IE [facility]    In 2 weeks Misael Melendrez PA [facility] Sleep Clinic [place], [place] SLEEP    In 1 month Travon Chua MD [facility] Neurology Clinic [facility]    In 1 month Warren Torres MD [facility] Mental Health & Addiction Services, [facility] SJO    In 1 month Joanne Sterling PA-C [facility] Weight Management Clinic [facility]          Plan: Patient was enrolled in Care Coordination. Patient will review [employer] website to find affordable housing options for her and her two children once she knows what her disability payments will be. [facility] will send intro letter and patient centered plan of care to velasquez. [facility] will reach out to patient in one month. [facility] will review chart in 6 weeks, per standard workflow.    IVET Bowens  Primary Care Clinic- Social Work Care Coordinator  [facility]- [place], [place] and [place]  Ph: [phone]  10/19/2021 12:46 PM         no

## 2023-09-08 ENCOUNTER — MEDICAL CORRESPONDENCE (OUTPATIENT)
Dept: HEALTH INFORMATION MANAGEMENT | Facility: CLINIC | Age: 33
End: 2023-09-08
Payer: MEDICARE

## 2023-09-09 ENCOUNTER — MYC REFILL (OUTPATIENT)
Dept: FAMILY MEDICINE | Facility: CLINIC | Age: 33
End: 2023-09-09
Payer: MEDICARE

## 2023-09-09 ENCOUNTER — MYC REFILL (OUTPATIENT)
Dept: FAMILY MEDICINE | Facility: CLINIC | Age: 33
End: 2023-09-09

## 2023-09-09 ENCOUNTER — MYC REFILL (OUTPATIENT)
Dept: PODIATRY | Facility: CLINIC | Age: 33
End: 2023-09-09
Payer: MEDICARE

## 2023-09-09 DIAGNOSIS — E66.01 CLASS 3 SEVERE OBESITY DUE TO EXCESS CALORIES WITH SERIOUS COMORBIDITY AND BODY MASS INDEX (BMI) OF 45.0 TO 49.9 IN ADULT (H): ICD-10-CM

## 2023-09-09 DIAGNOSIS — E66.813 CLASS 3 SEVERE OBESITY DUE TO EXCESS CALORIES WITH SERIOUS COMORBIDITY AND BODY MASS INDEX (BMI) OF 45.0 TO 49.9 IN ADULT (H): ICD-10-CM

## 2023-09-09 DIAGNOSIS — E11.65 TYPE 2 DIABETES MELLITUS WITH HYPERGLYCEMIA, WITHOUT LONG-TERM CURRENT USE OF INSULIN (H): ICD-10-CM

## 2023-09-09 DIAGNOSIS — G62.9 POLYNEUROPATHY: ICD-10-CM

## 2023-09-09 DIAGNOSIS — Z98.84 S/P LAPAROSCOPIC SLEEVE GASTRECTOMY: ICD-10-CM

## 2023-09-09 DIAGNOSIS — L84 CALLUS: ICD-10-CM

## 2023-09-11 ENCOUNTER — LAB (OUTPATIENT)
Dept: LAB | Facility: CLINIC | Age: 33
End: 2023-09-11
Payer: MEDICARE

## 2023-09-11 ENCOUNTER — OFFICE VISIT (OUTPATIENT)
Dept: NEUROLOGY | Facility: CLINIC | Age: 33
End: 2023-09-11
Payer: MEDICARE

## 2023-09-11 VITALS
OXYGEN SATURATION: 100 % | DIASTOLIC BLOOD PRESSURE: 77 MMHG | WEIGHT: 223.3 LBS | HEART RATE: 107 BPM | SYSTOLIC BLOOD PRESSURE: 104 MMHG | HEIGHT: 67 IN | BODY MASS INDEX: 35.05 KG/M2

## 2023-09-11 DIAGNOSIS — G61.81 CIDP (CHRONIC INFLAMMATORY DEMYELINATING POLYNEUROPATHY) (H): Primary | ICD-10-CM

## 2023-09-11 DIAGNOSIS — G61.81 CIDP (CHRONIC INFLAMMATORY DEMYELINATING POLYNEUROPATHY) (H): ICD-10-CM

## 2023-09-11 DIAGNOSIS — E11.65 TYPE 2 DIABETES MELLITUS WITH HYPERGLYCEMIA, WITHOUT LONG-TERM CURRENT USE OF INSULIN (H): Primary | ICD-10-CM

## 2023-09-11 LAB
Lab: NORMAL
PERFORMING LABORATORY: NORMAL
TEST NAME: NORMAL

## 2023-09-11 PROCEDURE — 83520 IMMUNOASSAY QUANT NOS NONAB: CPT | Performed by: PATHOLOGY

## 2023-09-11 PROCEDURE — 99214 OFFICE O/P EST MOD 30 MIN: CPT | Performed by: PSYCHIATRY & NEUROLOGY

## 2023-09-11 PROCEDURE — 36415 COLL VENOUS BLD VENIPUNCTURE: CPT | Performed by: PATHOLOGY

## 2023-09-11 RX ORDER — ARIPIPRAZOLE 10 MG/1
10 TABLET ORAL DAILY
COMMUNITY
Start: 2023-01-10 | End: 2023-11-30

## 2023-09-11 RX ORDER — AMMONIUM LACTATE 12 G/100G
CREAM TOPICAL 2 TIMES DAILY
Qty: 385 G | Refills: 0 | OUTPATIENT
Start: 2023-09-11

## 2023-09-11 RX ORDER — FUROSEMIDE 40 MG
40 TABLET ORAL DAILY
COMMUNITY
Start: 2023-01-12

## 2023-09-11 ASSESSMENT — PAIN SCALES - GENERAL: PAINLEVEL: SEVERE PAIN (7)

## 2023-09-11 NOTE — LETTER
"9/11/2023       RE: Hilaria Bonner  [address]  [facility] [address]     Dear Colleague,    Thank you for referring your patient, Hilaria Bonner, to the [facility] [place] at [facility]. Please see a copy of my visit note below.      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  In April 2020 she developed confirmed COVID infection. About 4 weeks later her neurologic symptoms began. Her first symptom was tingling in her hands followed within days burning in her hands.  It then quickly progressed to involving bilateral legs below the knee and then her feet. She felt weak in her hands and feet. Fine motor tasks and gait became impaired. She has trouble picking up objects because she has to watch herself  objects. She needed a walker. She also felt that her speech became periodically slurred. The sensory symptoms that included pain and allodynia were most problematic in her hands and feet. NCS in 7/20 showed absent sensory responses in the upper and lower limbs and normal motor responses. In 8/20 IVIG 2 gm/kg loading and then 1 gm/kg q 3 week maintenance was started. Through the fall 2020 she made slow improvements, particular in function and gait. By 11/20 she felt \"50%\" better. In 1/21 she reduced IVIG from 1 gm/kg q 3 weeks to q 4 week. In 2021 her neuropathy was stable but she developed thoracic compression fractures, and gait became limited by pain. In the spring and summer 2021 she reported worsening sensation and gait. Outcomes 6/2/21 showed a marked drop off in  strength and I-RODS. In 6/21 we increased IVIG to 1 gm/kg q 3 weeks. She stabilized through the end of 2021. In mid 2022 we reduced IVIG back to 1 gm/kg q 4 weeks.     Interval History:   I last saw her 1/25/23. After that visit  sNFL was checked and " found to be elevated at 19.4 (N 0-1.87). Also performed was a repeat NCS study which showed interval worsening. Because of her ongoing symptoms, these findings prompted us to escalate IVIG back to 1 gm/kg q 3 weeks.  She has been fairly regular with the IVIG infusions, but had trouble meeting all the appointments because her mother passed away. She does not recall attending an IVIG infusion since at least this time. She shared concerns that the infusions were not making a large difference in her symptoms. Occasionally she feels that they are helpful the day of an infusion, or that she has very minor worsening of symptoms when the infusion begins to wear off, but largely she believes her deficits remain constant despite her infusions. Since our last visit she is now able to ambulate without Still taking B1 and B12. No longer going to PT for several months - unsure of why. Has tried acupuncture for back pain. As far as her sensory symptoms she continues to report loss of sensation in hands and reports being able to feel pinprick but lacks some vibratory sensation in her hands. In her ankles and feet she has a similar sensation. She dresses herself, unable to use buttons. She has no issues feeding herself or with other basic needs. For mobility she has been walking unassisted. Reports no recent falls. Occasionally feels unsteady, but there are improvements in this regard.    Prior pertinent laboratory work-up:  5/2020:  ANH 1:160. Vitamin B1 low (45). B12 low/normal (285). Negative/normal double stranded DNA, ANCA, C-reactive, RF, GM1, GD1, Gq1b, vitamin A, B6, Vit E, SSA, SSB undetectable.  6/2020 CSF: 0 WBC, 0 RBC, protein 58 glucose 29.  7/2020: Normal Vitamin B1, B12, folate    8/2020: TS-HDS mildly elevated at 11360 (N<[id number]). Negative FGFR3, Immunofixation, paraneoplastic panel,GD1a.  12/20: B1 low (66)  1/2021:  B1 and B6 slightly elevated (B1 = 193, B6 = 182). Normal B12  3/21: Hba1c 5.0  6/21: Normal B6, SSa,  SSb, serum IF (no monoclonal protein)  1/26/23: sNFL elevated at 19.4 (N 0-1.87).     Prior electrophysiologic work-up:  7/23/20 NCS/EMG showed all absent upper and lower limb SNAPS and all normal upper and lower limb motor responses.   8/3/20 NCS/EMG showed absent right  median, ulnar, and sural sensory studies with radial having attenuated amplitude.   1/13/21: NCS still showed a non length-dependent sensory neuropathy or ganglionopathy, but compared to prior studies performed 8/3/20 and 7/23/20 there has been unequivocal interval improvement in sensory response amplitudes in the lower > upper limbs.   2/1/23: NCS/EMG showed a non length-dependent sensory predominant  neuropathy or ganglionopathy. There was a modest degree of worsening of most lower limb motor and sensory responses, although all would still be considered with normal limits. In the upper limbs incidental note is again made of a right sided ulnar neuropathy at the elbow.      Prior pertinent imaging work-up:  5/20: MRI brain with and without contrast was essentially normal  5/20: MRI C spine with and without contrast showed no abnormal enhancement in the spinal cord, thecal sac or cervical vertebrae. No spinal canal or neural foraminal narrowing.  4/21: MRI T and LS spine showed acute compression fractures of T6, T10 and T9 vertebrae, which are new compared to 11/5/2020.     Past Medical History:   Past Medical History:   Diagnosis Date    Acute kidney injury (H) 05/13/2019    Acute massive pulmonary embolism (H) 05/13/2019    Acute pancreatitis 08/18/2018    Acute pancreatitis 02/26/2021    due to ETOH    Acute thoracic back pain 04/07/2021    ARAMIS (acute kidney injury) (H) 05/26/2019    Cardiac arrest, cause unspecified (H) 05/13/2019    massive pulmonary embolism with pulseless electrical activity cardiac arrest in May 2019 following gastric bypass in April 2019     Depression with anxiety     GERD (gastroesophageal reflux disease)     History of  alcohol use disorder     HTN (hypertension)     Infection due to 2019 novel coronavirus 2020    COVID19 infection in 2020    Ischemic colitis (H) 2019    Morbid obesity (H)     Scalp laceration, initial encounter 2020     Past Surgical History:  Past Surgical History:   Procedure Laterality Date     SECTION      COLONOSCOPY N/A 2022    Procedure: COLONOSCOPY;  Surgeon: Chandrakant Toledo MD;  Location: [facility]    ESOPHAGOSCOPY, GASTROSCOPY, DUODENOSCOPY (EGD), COMBINED N/A 2022    Procedure: ESOPHAGOGASTRODUODENOSCOPY, WITH BIOPSY;  Surgeon: Chandrakant Toledo MD;  Location: [facility]    EXAM UNDER ANESTHESIA ANUS N/A 8/3/2022    Procedure: EXAM UNDER ANESTHESIA, ANUS;  Surgeon: Chip Way MD;  Location: [facility]    HEMORRHOIDECTOMY EXTERNAL N/A 8/3/2022    Procedure: HEMORRHOIDECTOMY, EXTERNAL;  Surgeon: Chip Way MD;  Location: [facility]    LAPAROSCOPIC GASTRIC SLEEVE N/A 2019    Procedure: Laparoscopic Sleeve Gastrectomy;  Surgeon: Luan Lopez MD;  Location: [facility]    ORTHOPEDIC SURGERY      2 knee meniscus surgery     Family history:    There is no known family history of hereditary neuropathies or other neuromuscular disorders.     Social History:    Social History     Tobacco Use    Smoking status: Former     Packs/day: 0.25     Years: 1.00     Pack years: 0.25     Types: Cigarettes, Vaping Device     Start date: 2016    Smokeless tobacco: Never    Tobacco comments:     Vaping daily    Vaping Use    Vaping Use: Never used   Substance Use Topics    Alcohol use: Not Currently     Comment: Quit drinking when last hospitalized    Drug use: Not Currently     Types: Marijuana      Medical Allergies:   No Known Allergies     Current Medications:    Current Outpatient Medications:     ammonium lactate (AMLACTIN) 12 % external cream, APPLY TOPICALLY TO THE AFFECTED AREA TWICE DAILY, Disp: 385 g, Rfl: 0    apixaban ANTICOAGULANT (ELIQUIS) 5 MG tablet,  Take 1 tablet (5 mg) by mouth 2 times daily, Disp: 60 tablet, Rfl: 3    ARIPiprazole (ABILIFY) 10 MG tablet, Take 10 mg by mouth daily, Disp: , Rfl:     blood glucose (NO BRAND SPECIFIED) lancets standard, Use to test blood sugar 2 times daily or as directed., Disp: 100 lancet, Rfl: 3    blood glucose calibration (NO BRAND SPECIFIED) solution, To accompany: Blood Glucose Monitor Brands: per insurance., Disp: 1 each, Rfl: 0    Calcium Citrate-Vitamin D 500-10 MG-MCG CHEW, Take 1 chew tab by mouth 3 times daily, Disp: 90 tablet, Rfl: 1    ferrous sulfate (FEROSUL) 325 (65 Fe) MG tablet, Take 1 tablet (325 mg) by mouth every 48 hours, Disp: 30 tablet, Rfl: 5    folic acid (FOLVITE) 1 MG tablet, Take 1 tablet (1 mg) by mouth daily, Disp: 90 tablet, Rfl: 3    furosemide (LASIX) 40 MG tablet, Take 40 mg by mouth daily, Disp: , Rfl:     hydrOXYzine (ATARAX) 25 MG tablet, Take 1-2 tablets (25-50 mg) by mouth every 6 hours as needed for anxiety, Disp: 180 tablet, Rfl: 1    ketoconazole (NIZORAL) 2 % external cream, Apply topically daily To hands/feet, Disp: 60 g, Rfl: 11    metFORMIN (GLUCOPHAGE XR) 500 MG 24 hr tablet, Take 1 tablet (500 mg) by mouth daily (with dinner), Disp: 90 tablet, Rfl: 1    multivitamin (CENTRUM) chewable tablet, Take 1 tablet by mouth daily, Disp: 90 tablet, Rfl: 3    OLANZapine (ZYPREXA) 10 MG tablet, Take 1 tablet (10 mg) by mouth At Bedtime, Disp: 30 tablet, Rfl: 3    OLANZapine (ZYPREXA) 2.5 MG tablet, Take 1 tablet (2.5 mg) by mouth 2 times daily as needed (Anxiety, agitation or psychosis), Disp: 60 tablet, Rfl: 1    polyethylene glycol (MIRALAX) 17 GM/Dose powder, Take 17 g by mouth daily, Disp: , Rfl:     potassium chloride ER (K-TAB/KLOR-CON) 10 MEQ CR tablet, TAKE 2 TABLETS(20 MEQ) BY MOUTH TWICE DAILY, Disp: 120 tablet, Rfl: 4    prazosin (MINIPRESS) 1 MG capsule, TAKE 2 CAPSULES(2 MG) BY MOUTH AT BEDTIME, Disp: 60 capsule, Rfl: 2    Pregabalin (LYRICA) 200 MG capsule, TAKE 1 CAPSULE(200  "MG) BY MOUTH THREE TIMES DAILY, Disp: 270 capsule, Rfl: 0    thiamine (B-1) 100 MG tablet, Take 1 tablet (100 mg) by mouth 3 times daily, Disp: , Rfl:     topiramate (TOPAMAX) 50 MG tablet, Take 50 mg by mouth 2 times daily, Disp: , Rfl:     traZODone (DESYREL) 50 MG tablet, Take 1 tablet (50 mg) by mouth nightly as needed for sleep, Disp: 30 tablet, Rfl: 4    venlafaxine (EFFEXOR XR) 150 MG 24 hr capsule, Take 1 capsule (150 mg) by mouth daily, Disp: 90 capsule, Rfl: 1    venlafaxine (EFFEXOR-ER) 75 MG 24 hr tablet, Take 1 tablet (75 mg) by mouth daily Take along with a 150 mg capsule for a total morning dose of 225 mg., Disp: 30 tablet, Rfl: 3    vitamin B complex with vitamin C (VITAMIN  B COMPLEX) tablet, Take 1 tablet by mouth daily, Disp: , Rfl:     vitamin D3 (CHOLECALCIFEROL) 50 mcg (2000 units) tablet, Take 1 tablet (50 mcg) by mouth daily, Disp: 30 tablet, Rfl: 5    blood glucose monitoring (NO BRAND SPECIFIED) meter device kit, Use to test blood sugar 1 times daily or as directed. Preferred blood glucose meter OR supplies to accompany: Blood Glucose Monitor Brands: per insurance., Disp: 1 kit, Rfl: 0    omeprazole (PRILOSEC) 20 MG DR capsule, Take 1 capsule (20 mg) by mouth daily, Disp: 90 capsule, Rfl: 1    Review of Systems: A complete review of systems was obtained and was negative except for what was noted above.     Physical examination:    /77 (BP Location: Left arm, Patient Position: Left side, Cuff Size: Adult Regular)   Pulse 107   Ht 1.702 m (5' 7\")   Wt 101.3 kg (223 lb 4.8 oz)   SpO2 100%   BMI 34.97 kg/m      General Appearance: NAD    Neurologic examination:    Mental status:  Patient is alert, attentive, and oriented x 3.  Language is coherent and fluent without aphasia.  Memory, comprehension and ability to follow commands were intact.       Cranial nerves:   Pupils were round and reacted to light.  Extraocular movements were full. There was no face, jaw, palate or tongue " weakness or atrophy. Hearing was grossly intact.       Motor exam: No atrophy or fasciculations.   Manual muscle testing revealed the following MRC grade muscle power:   Right Left   Neck flexion 5    Neck extension: 5    Shoulder abduction:  5 5   Elbow extension: 5 5   Elbow flexion:  5 5   Wrist flexion:  5 5   Wrist extension:  5 5   APB 5 5   FDI 4 4   Hip flexion 5 5   Knee flexion 5 5   Knee extension 5 5   Dorsiflexion 5 5   Plantar flexion 5 5   Red indicates worse when compared to last examination  Green indicates improved when compared to last examination    Complex motor skills: Mild bilateral postural hand tremor. Moderate ataxia with FNF.    Sensory exam: Vibration reduced in toes > ankles and fingers. Pin reduced below the knee and below the elbows    Gait: Unable to walk today    Deep tendon reflexes:   Right Left   Triceps 2 2   Biceps 1 2   Brachioradialis 2 2   Knee jerk 0 0   Ankle jerk 0 0     Neuropathy Assessments      2023    12:00 PM 2023     8:00 AM 2022    10:00 AM 2021    10:00 AM 2021     9:00 AM 3/31/2021     9:00 AM 3/31/2021     8:00 AM   Neurology Assessments   RODS CIDP/MGUSP Score 37 28 31 34 22  33   Time for 'Up and Go' test- Seconds: 11.25  9.1 8.9  25.7          2023    12:00 PM 2023     8:00 AM 2022    10:00 AM 2021    10:00 AM 2021     9:00 AM 3/31/2021     8:00 AM 2021    10:00 AM    Strength:   Right hand strength in K 16 23 25 16 24 25   Left hand strength in K 24 25 30 14 28 28         2023     2:00 PM 2022    10:00 AM 2021    10:00 AM 2021     9:00 AM 3/31/2021     8:00 AM 2021    10:00 AM 2020     9:00 AM   Immunotherapy   Time since last IVIG (days):  2.5 weeks 1 day 1 week 27 days 1 week 12   Current treatment: IVIG 1 gm/kg q 3 weeks IVIG 1 gm/kg q 3 weeks IVIG 1 gm/kg q 3 weeks IVIG 1 gm/kg q 4 weeks IVIG 1gm/kg q 4 weeks IVIG 1 gm/kg q 3 weeks IVIG 1gm/kg [id number]      Assessment:    Hilaria Bonner is a 32 year old woman with an acute onset (5/2020) 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  She also had well documented nutritional deficiencies (B1) at the time of neuropathy onset which may have contributed as well, but this would not explain interval worsening since B1 supplementation. In early 2023 noted that there was some modest interval NCS worsening and sNfL was elevated, suggesting an active component of the neuropathy. We elected to escalate IVIG based in part on those findings. Today she is clinically and functionally doing well. Will begin to back off on IVIG as below. If she relapses during the taper period then will increase to lowest effective dose. Considering previously elevated NFL, repeat testing sNFL may be helpful to understand her disease activity.  If there is no evidence for active immunologic disease then will taper IVIG and focus on supportive care.      Plan:      1. Labs: repeat serum NfL for interval change  2. Immunotherapy: Reduce IVIG from 1 gm/kg q 3 weeks to q 4 weeks. If stable at next visit and NFL normalized visit will continue taper  4. Pain: Continue follow up in the multidisciplinary pain clinic.   5. Nutritional: Continue B1 and B12 supplementation.   6. Gait and conditioning: Continue PT exercises  7. Follow up in 3 months. Sooner if needed.     Kurtis Veloz  Medical Student    Seen and discussed with Dr. Chua. His addendum follows.   ---  ADDENDUM:  I personally interviewed and examined the patient. I agree with the history, physical, assessment, and plan as documented above. Changes to the physical examination, assessment and plan have been incorporated into the note by myself, as to make it a single cohesive document.        Again, thank you for allowing me to participate in the care of your patient.      Sincerely,    Travon Chua MD

## 2023-09-11 NOTE — TELEPHONE ENCOUNTER
I have not seen patient since April- has she been seeing someone else? Assist with scheduling face to face visit- same day ok

## 2023-09-11 NOTE — PROGRESS NOTES
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  In April 2020 she developed confirmed COVID infection. About 4 weeks later her neurologic symptoms began. Her first symptom was tingling in her hands followed within days burning in her hands.  It then quickly progressed to involving bilateral legs below the knee and then her feet. She felt weak in her hands and feet. Fine motor tasks and gait became impaired. She has trouble picking up objects because she has to watch herself  objects. She needed a walker. She also felt that her speech became periodically slurred. The sensory symptoms that included pain and allodynia were most problematic in her hands and feet. NCS in 7/20 showed absent sensory responses in the upper and lower limbs and normal motor responses. In 8/20 IVIG 2 gm/kg loading and then 1 gm/kg q 3 week maintenance was started. Through the fall 2020 she made slow improvements, particular in function and gait. By 11/20 she felt \"50%\" better. In 1/21 she reduced IVIG from 1 gm/kg q 3 weeks to q 4 week. In 2021 her neuropathy was stable but she developed thoracic compression fractures, and gait became limited by pain. In the spring and summer 2021 she reported worsening sensation and gait. Outcomes 6/2/21 showed a marked drop off in  strength and I-RODS. In 6/21 we increased IVIG to 1 gm/kg q 3 weeks. She stabilized through the end of 2021. In mid 2022 we reduced IVIG back to 1 gm/kg q 4 weeks.     Interval History:   I last saw her 1/25/23. After that visit  sNFL was checked and found to be elevated at 19.4 (N 0-1.87). Also performed was a repeat NCS study which showed interval worsening. Because of her ongoing symptoms, these findings prompted us to escalate IVIG back to 1 gm/kg q 3 weeks.  She has been fairly regular with the IVIG infusions, but had trouble meeting all the appointments because " her mother passed away. She does not recall attending an IVIG infusion since at least this time. She shared concerns that the infusions were not making a large difference in her symptoms. Occasionally she feels that they are helpful the day of an infusion, or that she has very minor worsening of symptoms when the infusion begins to wear off, but largely she believes her deficits remain constant despite her infusions. Since our last visit she is now able to ambulate without Still taking B1 and B12. No longer going to PT for several months - unsure of why. Has tried acupuncture for back pain. As far as her sensory symptoms she continues to report loss of sensation in hands and reports being able to feel pinprick but lacks some vibratory sensation in her hands. In her ankles and feet she has a similar sensation. She dresses herself, unable to use buttons. She has no issues feeding herself or with other basic needs. For mobility she has been walking unassisted. Reports no recent falls. Occasionally feels unsteady, but there are improvements in this regard.    Prior pertinent laboratory work-up:  5/2020:  ANH 1:160. Vitamin B1 low (45). B12 low/normal (285). Negative/normal double stranded DNA, ANCA, C-reactive, RF, GM1, GD1, Gq1b, vitamin A, B6, Vit E, SSA, SSB undetectable.  6/2020 CSF: 0 WBC, 0 RBC, protein 58 glucose 29.  7/2020: Normal Vitamin B1, B12, folate    8/2020: TS-HDS mildly elevated at [address]157 (N<[id number]). Negative FGFR3, Immunofixation, paraneoplastic panel,GD1a.  12/20: B1 low (66)  1/2021:  B1 and B6 slightly elevated (B1 = 193, B6 = 182). Normal B12  3/21: Hba1c 5.0  6/21: Normal B6, SSa, SSb, serum IF (no monoclonal protein)  1/26/23: sNFL elevated at 19.4 (N 0-1.87).     Prior electrophysiologic work-up:  7/23/20 NCS/EMG showed all absent upper and lower limb SNAPS and all normal upper and lower limb motor responses.   8/3/20 NCS/EMG showed absent right  median, ulnar, and sural sensory studies with  radial having attenuated amplitude.   1/13/21: NCS still showed a non length-dependent sensory neuropathy or ganglionopathy, but compared to prior studies performed 8/3/20 and 7/23/20 there has been unequivocal interval improvement in sensory response amplitudes in the lower > upper limbs.   2/1/23: NCS/EMG showed a non length-dependent sensory predominant  neuropathy or ganglionopathy. There was a modest degree of worsening of most lower limb motor and sensory responses, although all would still be considered with normal limits. In the upper limbs incidental note is again made of a right sided ulnar neuropathy at the elbow.      Prior pertinent imaging work-up:  5/20: MRI brain with and without contrast was essentially normal  5/20: MRI C spine with and without contrast showed no abnormal enhancement in the spinal cord, thecal sac or cervical vertebrae. No spinal canal or neural foraminal narrowing.  4/21: MRI T and LS spine showed acute compression fractures of T6, T10 and T9 vertebrae, which are new compared to 11/5/2020.     Past Medical History:   Past Medical History:   Diagnosis Date    Acute kidney injury (H) 05/13/2019    Acute massive pulmonary embolism (H) 05/13/2019    Acute pancreatitis 08/18/2018    Acute pancreatitis 02/26/2021    due to ETOH    Acute thoracic back pain 04/07/2021    ARAMIS (acute kidney injury) (H) 05/26/2019    Cardiac arrest, cause unspecified (H) 05/13/2019    massive pulmonary embolism with pulseless electrical activity cardiac arrest in May 2019 following gastric bypass in April 2019     Depression with anxiety     GERD (gastroesophageal reflux disease)     History of alcohol use disorder     HTN (hypertension)     Infection due to 2019 novel coronavirus 04/2020    COVID19 infection in April 2020    Ischemic colitis (H) 05/26/2019    Morbid obesity (H)     Scalp laceration, initial encounter 02/29/2020     Past Surgical History:  Past Surgical History:   Procedure Laterality Date      SECTION      COLONOSCOPY N/A 2022    Procedure: COLONOSCOPY;  Surgeon: Chandrakant Toledo MD;  Location:  GI    ESOPHAGOSCOPY, GASTROSCOPY, DUODENOSCOPY (EGD), COMBINED N/A 2022    Procedure: ESOPHAGOGASTRODUODENOSCOPY, WITH BIOPSY;  Surgeon: Chandrakant Toledo MD;  Location:  GI    EXAM UNDER ANESTHESIA ANUS N/A 8/3/2022    Procedure: EXAM UNDER ANESTHESIA, ANUS;  Surgeon: Chip Way MD;  Location: [facility] OR    HEMORRHOIDECTOMY EXTERNAL N/A 8/3/2022    Procedure: HEMORRHOIDECTOMY, EXTERNAL;  Surgeon: Chip Way MD;  Location: [facility] OR    LAPAROSCOPIC GASTRIC SLEEVE N/A 2019    Procedure: Laparoscopic Sleeve Gastrectomy;  Surgeon: Luan Lopez MD;  Location:  OR    ORTHOPEDIC SURGERY      2 knee meniscus surgery     Family history:    There is no known family history of hereditary neuropathies or other neuromuscular disorders.     Social History:    Social History     Tobacco Use    Smoking status: Former     Packs/day: 0.25     Years: 1.00     Pack years: 0.25     Types: Cigarettes, Vaping Device     Start date: 2016    Smokeless tobacco: Never    Tobacco comments:     Vaping daily    Vaping Use    Vaping Use: Never used   Substance Use Topics    Alcohol use: Not Currently     Comment: Quit drinking when last hospitalized    Drug use: Not Currently     Types: Marijuana      Medical Allergies:   No Known Allergies     Current Medications:    Current Outpatient Medications:     ammonium lactate (AMLACTIN) 12 % external cream, APPLY TOPICALLY TO THE AFFECTED AREA TWICE DAILY, Disp: 385 g, Rfl: 0    apixaban ANTICOAGULANT (ELIQUIS) 5 MG tablet, Take 1 tablet (5 mg) by mouth 2 times daily, Disp: 60 tablet, Rfl: 3    ARIPiprazole (ABILIFY) 10 MG tablet, Take 10 mg by mouth daily, Disp: , Rfl:     blood glucose (NO BRAND SPECIFIED) lancets standard, Use to test blood sugar 2 times daily or as directed., Disp: 100 lancet, Rfl: 3    blood glucose calibration  (NO BRAND SPECIFIED) solution, To accompany: Blood Glucose Monitor Brands: per insurance., Disp: 1 each, Rfl: 0    Calcium Citrate-Vitamin D 500-10 MG-MCG CHEW, Take 1 chew tab by mouth 3 times daily, Disp: 90 tablet, Rfl: 1    ferrous sulfate (FEROSUL) 325 (65 Fe) MG tablet, Take 1 tablet (325 mg) by mouth every 48 hours, Disp: 30 tablet, Rfl: 5    folic acid (FOLVITE) 1 MG tablet, Take 1 tablet (1 mg) by mouth daily, Disp: 90 tablet, Rfl: 3    furosemide (LASIX) 40 MG tablet, Take 40 mg by mouth daily, Disp: , Rfl:     hydrOXYzine (ATARAX) 25 MG tablet, Take 1-2 tablets (25-50 mg) by mouth every 6 hours as needed for anxiety, Disp: 180 tablet, Rfl: 1    ketoconazole (NIZORAL) 2 % external cream, Apply topically daily To hands/feet, Disp: 60 g, Rfl: 11    metFORMIN (GLUCOPHAGE XR) 500 MG 24 hr tablet, Take 1 tablet (500 mg) by mouth daily (with dinner), Disp: 90 tablet, Rfl: 1    multivitamin (CENTRUM) chewable tablet, Take 1 tablet by mouth daily, Disp: 90 tablet, Rfl: 3    OLANZapine (ZYPREXA) 10 MG tablet, Take 1 tablet (10 mg) by mouth At Bedtime, Disp: 30 tablet, Rfl: 3    OLANZapine (ZYPREXA) 2.5 MG tablet, Take 1 tablet (2.5 mg) by mouth 2 times daily as needed (Anxiety, agitation or psychosis), Disp: 60 tablet, Rfl: 1    polyethylene glycol (MIRALAX) 17 GM/Dose powder, Take 17 g by mouth daily, Disp: , Rfl:     potassium chloride ER (K-TAB/KLOR-CON) 10 MEQ CR tablet, TAKE 2 TABLETS(20 MEQ) BY MOUTH TWICE DAILY, Disp: 120 tablet, Rfl: 4    prazosin (MINIPRESS) 1 MG capsule, TAKE 2 CAPSULES(2 MG) BY MOUTH AT BEDTIME, Disp: 60 capsule, Rfl: 2    Pregabalin (LYRICA) 200 MG capsule, TAKE 1 CAPSULE(200 MG) BY MOUTH THREE TIMES DAILY, Disp: 270 capsule, Rfl: 0    thiamine (B-1) 100 MG tablet, Take 1 tablet (100 mg) by mouth 3 times daily, Disp: , Rfl:     topiramate (TOPAMAX) 50 MG tablet, Take 50 mg by mouth 2 times daily, Disp: , Rfl:     traZODone (DESYREL) 50 MG tablet, Take 1 tablet (50 mg) by mouth nightly  "as needed for sleep, Disp: 30 tablet, Rfl: 4    venlafaxine (EFFEXOR XR) 150 MG 24 hr capsule, Take 1 capsule (150 mg) by mouth daily, Disp: 90 capsule, Rfl: 1    venlafaxine (EFFEXOR-ER) 75 MG 24 hr tablet, Take 1 tablet (75 mg) by mouth daily Take along with a 150 mg capsule for a total morning dose of 225 mg., Disp: 30 tablet, Rfl: 3    vitamin B complex with vitamin C (VITAMIN  B COMPLEX) tablet, Take 1 tablet by mouth daily, Disp: , Rfl:     vitamin D3 (CHOLECALCIFEROL) 50 mcg (2000 units) tablet, Take 1 tablet (50 mcg) by mouth daily, Disp: 30 tablet, Rfl: 5    blood glucose monitoring (NO BRAND SPECIFIED) meter device kit, Use to test blood sugar 1 times daily or as directed. Preferred blood glucose meter OR supplies to accompany: Blood Glucose Monitor Brands: per insurance., Disp: 1 kit, Rfl: 0    omeprazole (PRILOSEC) 20 MG DR capsule, Take 1 capsule (20 mg) by mouth daily, Disp: 90 capsule, Rfl: 1    Review of Systems: A complete review of systems was obtained and was negative except for what was noted above.     Physical examination:    /77 (BP Location: Left arm, Patient Position: Left side, Cuff Size: Adult Regular)   Pulse 107   Ht 1.702 m (5' 7\")   Wt 101.3 kg (223 lb 4.8 oz)   SpO2 100%   BMI 34.97 kg/m      General Appearance: NAD    Neurologic examination:    Mental status:  Patient is alert, attentive, and oriented x 3.  Language is coherent and fluent without aphasia.  Memory, comprehension and ability to follow commands were intact.       Cranial nerves:   Pupils were round and reacted to light.  Extraocular movements were full. There was no face, jaw, palate or tongue weakness or atrophy. Hearing was grossly intact.       Motor exam: No atrophy or fasciculations.   Manual muscle testing revealed the following MRC grade muscle power:   Right Left   Neck flexion 5    Neck extension: 5    Shoulder abduction:  5 5   Elbow extension: 5 5   Elbow flexion:  5 5   Wrist flexion:  5 5   Wrist " extension:  5 5   APB 5 5   FDI 4 4   Hip flexion 5 5   Knee flexion 5 5   Knee extension 5 5   Dorsiflexion 5 5   Plantar flexion 5 5   Red indicates worse when compared to last examination  Green indicates improved when compared to last examination    Complex motor skills: Mild bilateral postural hand tremor. Moderate ataxia with FNF.    Sensory exam: Vibration reduced in toes > ankles and fingers. Pin reduced below the knee and below the elbows    Gait: Unable to walk today    Deep tendon reflexes:   Right Left   Triceps 2 2   Biceps 1 2   Brachioradialis 2 2   Knee jerk 0 0   Ankle jerk 0 0     Neuropathy Assessments      2023    12:00 PM 2023     8:00 AM 2022    10:00 AM 2021    10:00 AM 2021     9:00 AM 3/31/2021     9:00 AM 3/31/2021     8:00 AM   Neurology Assessments   RODS CIDP/MGUSP Score 37 28 31 34 22  33   Time for 'Up and Go' test- Seconds: 11.25  9.1 8.9  25.7          2023    12:00 PM 2023     8:00 AM 2022    10:00 AM 2021    10:00 AM 2021     9:00 AM 3/31/2021     8:00 AM 2021    10:00 AM    Strength:   Right hand strength in K 16 23 25 16 24 25   Left hand strength in K 24 25 30 14 28 28         2023     2:00 PM 2022    10:00 AM 2021    10:00 AM 2021     9:00 AM 3/31/2021     8:00 AM 2021    10:00 AM 2020     9:00 AM   Immunotherapy   Time since last IVIG (days):  2.5 weeks 1 day 1 week 27 days 1 week 12   Current treatment: IVIG 1 gm/kg q 3 weeks IVIG 1 gm/kg q 3 weeks IVIG 1 gm/kg q 3 weeks IVIG 1 gm/kg q 4 weeks IVIG 1gm/kg q 4 weeks IVIG 1 gm/kg q 3 weeks IVIG 1gm/kg [id number]     Assessment:    Hilaria Bonner is a 32 year old woman with an acute onset (2020) 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  She also had well documented nutritional deficiencies (B1) at the time of neuropathy onset which may have contributed as well, but this  would not explain interval worsening since B1 supplementation. In early 2023 noted that there was some modest interval NCS worsening and sNfL was elevated, suggesting an active component of the neuropathy. We elected to escalate IVIG based in part on those findings. Today she is clinically and functionally doing well. Will begin to back off on IVIG as below. If she relapses during the taper period then will increase to lowest effective dose. Considering previously elevated NFL, repeat testing sNFL may be helpful to understand her disease activity.  If there is no evidence for active immunologic disease then will taper IVIG and focus on supportive care.      Plan:      1. Labs: repeat serum NfL for interval change  2. Immunotherapy: Reduce IVIG from 1 gm/kg q 3 weeks to q 4 weeks. If stable at next visit and NFL normalized visit will continue taper  4. Pain: Continue follow up in the multidisciplinary pain clinic.   5. Nutritional: Continue B1 and B12 supplementation.   6. Gait and conditioning: Continue PT exercises  7. Follow up in 3 months. Sooner if needed.     Kurtis Veloz  Medical Student    Seen and discussed with Dr. Chua. His addendum follows.   ---  ADDENDUM:  I personally interviewed and examined the patient. I agree with the history, physical, assessment, and plan as documented above. Changes to the physical examination, assessment and plan have been incorporated into the note by myself, as to make it a single cohesive document.    Travon Chua MD

## 2023-09-11 NOTE — NURSING NOTE
Chief Complaint   Patient presents with    RECHECK       Vitals were taken and medications were reconciled.      Sascha Amin, Technician  11:47 AM  September 11, 2023

## 2023-09-11 NOTE — TELEPHONE ENCOUNTER
After chart review it was noted that this is not appropriate for a refill.    JONATHAN NelsonN RN Specialty Triage 9/11/2023 1:17 PM

## 2023-09-12 ENCOUNTER — TELEPHONE (OUTPATIENT)
Dept: FAMILY MEDICINE | Facility: CLINIC | Age: 33
End: 2023-09-12

## 2023-09-12 DIAGNOSIS — E11.65 TYPE 2 DIABETES MELLITUS WITH HYPERGLYCEMIA, WITHOUT LONG-TERM CURRENT USE OF INSULIN (H): Primary | ICD-10-CM

## 2023-09-12 NOTE — TELEPHONE ENCOUNTER
RN attempted to call patient and both numbers on file for patient state number is no longer in service. There is no CTC on file for anyone else.     Patient is active on [employer] and will send [employer] message relaying provider message and request.     Please assist in scheduling lab only appointment if patient responds back to [employer] message.    TEN De La Cruz  [facility] Triage

## 2023-09-12 NOTE — TELEPHONE ENCOUNTER
Received critical lab result from lab   Unable to result A1C  Please assist with scheduling nonfasting labs only appointment to recheck A1C

## 2023-09-13 LAB — MAYO MISC RESULT: NORMAL

## 2023-09-19 NOTE — PROGRESS NOTES
This is a recent snapshot of the patient's [facility] medical record.  For current drug dose and complete information and questions, call [phone] or In Basket pool, [facility] infusion ([id number])  CSN Number:  [id number]

## 2023-09-25 ENCOUNTER — MEDICAL CORRESPONDENCE (OUTPATIENT)
Dept: HEALTH INFORMATION MANAGEMENT | Facility: CLINIC | Age: 33
End: 2023-09-25

## 2023-10-04 ENCOUNTER — OFFICE VISIT (OUTPATIENT)
Dept: NEUROLOGY | Facility: CLINIC | Age: 33
End: 2023-10-04
Payer: MEDICARE

## 2023-10-04 ENCOUNTER — PRE VISIT (OUTPATIENT)
Dept: NEUROLOGY | Facility: CLINIC | Age: 33
End: 2023-10-04

## 2023-10-04 ENCOUNTER — LAB (OUTPATIENT)
Dept: LAB | Facility: CLINIC | Age: 33
End: 2023-10-04
Payer: MEDICARE

## 2023-10-04 VITALS
OXYGEN SATURATION: 100 % | HEART RATE: 77 BPM | BODY MASS INDEX: 39.27 KG/M2 | SYSTOLIC BLOOD PRESSURE: 120 MMHG | WEIGHT: 250.7 LBS | DIASTOLIC BLOOD PRESSURE: 86 MMHG

## 2023-10-04 DIAGNOSIS — I10 BENIGN ESSENTIAL HYPERTENSION: Primary | ICD-10-CM

## 2023-10-04 DIAGNOSIS — R41.3 MEMORY LOSS: ICD-10-CM

## 2023-10-04 DIAGNOSIS — E53.8 VITAMIN B12 DEFICIENCY (NON ANEMIC): ICD-10-CM

## 2023-10-04 DIAGNOSIS — Z13.220 SCREENING FOR HYPERLIPIDEMIA: ICD-10-CM

## 2023-10-04 DIAGNOSIS — E11.65 TYPE 2 DIABETES MELLITUS WITH HYPERGLYCEMIA, WITHOUT LONG-TERM CURRENT USE OF INSULIN (H): ICD-10-CM

## 2023-10-04 DIAGNOSIS — F06.30 MOOD DISORDER IN CONDITIONS CLASSIFIED ELSEWHERE: ICD-10-CM

## 2023-10-04 DIAGNOSIS — E83.52 HYPERCALCEMIA: ICD-10-CM

## 2023-10-04 DIAGNOSIS — E87.6 HYPOKALEMIA: ICD-10-CM

## 2023-10-04 DIAGNOSIS — Z13.0 SCREENING FOR DEFICIENCY ANEMIA: ICD-10-CM

## 2023-10-04 DIAGNOSIS — F06.30 MOOD DISORDER IN CONDITIONS CLASSIFIED ELSEWHERE: Primary | ICD-10-CM

## 2023-10-04 LAB
ANION GAP SERPL CALCULATED.3IONS-SCNC: 8 MMOL/L (ref 7–15)
BASO+EOS+MONOS # BLD AUTO: ABNORMAL 10*3/UL
BASO+EOS+MONOS NFR BLD AUTO: ABNORMAL %
BASOPHILS # BLD AUTO: 0.1 10E3/UL (ref 0–0.2)
BASOPHILS NFR BLD AUTO: 1 %
BUN SERPL-MCNC: 9.9 MG/DL (ref 6–20)
CA-I BLD-MCNC: 4.6 MG/DL (ref 4.4–5.2)
CALCIUM SERPL-MCNC: 8.9 MG/DL (ref 8.6–10)
CHLORIDE SERPL-SCNC: 106 MMOL/L (ref 98–107)
CHOLEST SERPL-MCNC: 227 MG/DL
CREAT SERPL-MCNC: 1.96 MG/DL (ref 0.51–0.95)
DEPRECATED HCO3 PLAS-SCNC: 29 MMOL/L (ref 22–29)
EGFRCR SERPLBLD CKD-EPI 2021: 34 ML/MIN/1.73M2
EOSINOPHIL # BLD AUTO: 0.1 10E3/UL (ref 0–0.7)
EOSINOPHIL NFR BLD AUTO: 2 %
ERYTHROCYTE [DISTWIDTH] IN BLOOD BY AUTOMATED COUNT: 15 % (ref 10–15)
GLUCOSE SERPL-MCNC: 107 MG/DL (ref 70–99)
HBA1C MFR BLD: 12.9 %
HCT VFR BLD AUTO: 29.4 % (ref 35–47)
HDLC SERPL-MCNC: 39 MG/DL
HGB BLD-MCNC: 9.2 G/DL (ref 11.7–15.7)
IMM GRANULOCYTES # BLD: 0 10E3/UL
IMM GRANULOCYTES NFR BLD: 0 %
LDLC SERPL CALC-MCNC: 142 MG/DL
LYMPHOCYTES # BLD AUTO: 2.2 10E3/UL (ref 0.8–5.3)
LYMPHOCYTES NFR BLD AUTO: 37 %
MAGNESIUM SERPL-MCNC: 1.9 MG/DL (ref 1.7–2.3)
MCH RBC QN AUTO: 30.4 PG (ref 26.5–33)
MCHC RBC AUTO-ENTMCNC: 31.3 G/DL (ref 31.5–36.5)
MCV RBC AUTO: 97 FL (ref 78–100)
MONOCYTES # BLD AUTO: 0.4 10E3/UL (ref 0–1.3)
MONOCYTES NFR BLD AUTO: 7 %
NEUTROPHILS # BLD AUTO: 3.1 10E3/UL (ref 1.6–8.3)
NEUTROPHILS NFR BLD AUTO: 53 %
NONHDLC SERPL-MCNC: 188 MG/DL
NRBC # BLD AUTO: 0 10E3/UL
NRBC BLD AUTO-RTO: 0 /100
PHOSPHATE SERPL-MCNC: 3.6 MG/DL (ref 2.5–4.5)
PLATELET # BLD AUTO: 181 10E3/UL (ref 150–450)
POTASSIUM SERPL-SCNC: 4.1 MMOL/L (ref 3.4–5.3)
RBC # BLD AUTO: 3.03 10E6/UL (ref 3.8–5.2)
SODIUM SERPL-SCNC: 143 MMOL/L (ref 135–145)
TRIGL SERPL-MCNC: 230 MG/DL
VIT B12 SERPL-MCNC: 488 PG/ML (ref 232–1245)
WBC # BLD AUTO: 5.8 10E3/UL (ref 4–11)

## 2023-10-04 PROCEDURE — 84425 ASSAY OF VITAMIN B-1: CPT | Mod: 90 | Performed by: PATHOLOGY

## 2023-10-04 PROCEDURE — 85025 COMPLETE CBC W/AUTO DIFF WBC: CPT | Performed by: PATHOLOGY

## 2023-10-04 PROCEDURE — 80048 BASIC METABOLIC PNL TOTAL CA: CPT | Performed by: PATHOLOGY

## 2023-10-04 PROCEDURE — 84100 ASSAY OF PHOSPHORUS: CPT | Performed by: PATHOLOGY

## 2023-10-04 PROCEDURE — 80061 LIPID PANEL: CPT | Performed by: PATHOLOGY

## 2023-10-04 PROCEDURE — 99215 OFFICE O/P EST HI 40 MIN: CPT | Performed by: PSYCHIATRY & NEUROLOGY

## 2023-10-04 PROCEDURE — 83735 ASSAY OF MAGNESIUM: CPT | Performed by: PATHOLOGY

## 2023-10-04 PROCEDURE — 36415 COLL VENOUS BLD VENIPUNCTURE: CPT | Performed by: PATHOLOGY

## 2023-10-04 PROCEDURE — 99000 SPECIMEN HANDLING OFFICE-LAB: CPT | Performed by: PATHOLOGY

## 2023-10-04 PROCEDURE — 83036 HEMOGLOBIN GLYCOSYLATED A1C: CPT | Performed by: PHYSICIAN ASSISTANT

## 2023-10-04 PROCEDURE — 86341 ISLET CELL ANTIBODY: CPT | Mod: 90 | Performed by: PATHOLOGY

## 2023-10-04 PROCEDURE — 82330 ASSAY OF CALCIUM: CPT | Performed by: PATHOLOGY

## 2023-10-04 PROCEDURE — 99417 PROLNG OP E/M EACH 15 MIN: CPT | Performed by: PSYCHIATRY & NEUROLOGY

## 2023-10-04 PROCEDURE — 82607 VITAMIN B-12: CPT | Mod: AY | Performed by: PHYSICIAN ASSISTANT

## 2023-10-04 RX ORDER — LANOLIN ALCOHOL/MO/W.PET/CERES
3 CREAM (GRAM) TOPICAL
COMMUNITY

## 2023-10-04 ASSESSMENT — PAIN SCALES - GENERAL: PAINLEVEL: EXTREME PAIN (8)

## 2023-10-04 NOTE — PROGRESS NOTES
[facility] Neurology Consultation    Hilaria Bonner MRN# [id number]   Age: 32 year old YOB: 1990     Requesting physician: No ref. provider found  Crissy Madrigal     Reason for Consultation: CIDP      History of Presenting Symptoms:   Hilaria Bonner is a 32 year old female who presents today for evaluation of CIDP.  There is a pertinent medical history of severe MDD, nutritional deficiciencies ([id number]), CIDP (see below paragraph), DMII, EKNDALL, s/p sleeve gastrectomy, cardiac arrest, alcohol abuse, and T9 compression fracture w/out requiring surgical correction.    The patient is followed with Dr. 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  At that last visit, it was noted that in early 2023 her NCS and sNfL were worsened and at that time required escalation of her IVIG.  However, at the visit in 9/2023, it was thought her IVIG could be tapered down if monitoring markers were reducing.      Upon chart review, the patient has a longstanding psychiatric history of major depressive disorder including auditory hallucinations, and sleep disruption. She was seen with her psychiatric providers 3/23/2023 for her management of her symptoms, and during the visit she was noted to be a vague historian and was reporting no significant change while using her zyprexa or abilify (question as to compliance was mentioned).  A telephone message with patient's PA-C 3/27/2023 indicated that neuropsychology testing was to be done 5/1/2023 through [facility] and a general neurology referral was made. I think this relates to a 3/21/2023 PCP visit were it is noted that the  patient was sober with confusion since a hospitalization 3 months prior (11/27/2022 - 12/20/2022) and there was a concern for Wernicke-Korsakoff syndrome.  She was set to have a neuropsychology test with Dr. Rodriguez on 3/20/23, but I cannot see this as occurring.    When looking at her discharge summary 12/20/2022, it seems the patient was admitted with acute sepsis + lactic acidosis, respiratory failure, and CAP but during the admission she was diagnosed with wernicke-Korsakoff from these conditions?  It was also noted she had generalized weakness, deconditioning, and cognitive deficits. Thiamine level s during her visit were normal, as were ammonia levels, but her AST and GGT were elevated.  She was delirious during the hospitalization, which improved as her sepsis improved.  During delirium, she had a SLUMS of 12/30.  Neurology evaluation included EEG, autoimmune encephalopathy panel which were both without major findings.  She was on a large degree of psychiatric medications (aripiprazole, hydroxyzine, prazosin, venlafaxine) on top of chronic pain medications (pregabalin 200 mg TID).    Today, the patent is alone.  She doesn't recall much of her 11/2022 and 12/222 admissions for infection which included delirium  She tells me that she uses a care-coordinator and  to help manage her appointments.  She lives at a nursing home at the moment due to deconditioning, and cognitive difficulties, but overall cannot tell me why she is there in general.  At this time, she doesn't manage medications, her schedule, and most her daily needs are met through services at the living facility.       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 She has quit drinking alcohol at this time.     Medications:   Aripiprazole  Apixiban  Furosemide  Hydroxyzine  Metformin  Olanzapine 2.5 every day PRN, 10 mg at bedtime  Pregabalin 200 mg TID  Thiamine  Topiramate 50 mg  "BID  Trazodone 50 mg at bedtime PRN  Venlafaxine 225 mg QAM     Physical Exam:   Vitals: /86 (BP Location: Right arm, Patient Position: Sitting, Cuff Size: Adult Large)   Pulse 77   Wt 113.7 kg (250 lb 11.2 oz)   SpO2 100%   BMI 39.27 kg/m     General: Seated comfortably in no acute distress. New nails on, stains on shirt, well groomed. Alone today. Talks in low volume but is emotive in quality. Child like in voice, but not necessarily for entire behavior (mannerisms, quality/content of speech).   HEENT: No paracervical muscle tenderness or tightness.    Neurologic:     Mental Status: Fully alert, attentive and oriented. Speech clear and fluent, no paraphasic errors.   MoCA: 23/30  - Visualspatial/executive: 3/5 (trails with skipping B but otherwise pattern is normal. Clock is round with correct number placement but hand placement is incorrect (asked for ten after eleven and she jamison a short hand at 12 and the long hand at 1)  - Naming: 3/3  - Recall: 3/5 (requires multiple choice for face, and velvet to be recalled)  - Attention: 5/6 (two errors with serial 7's)  - Language: 1/3 (doesn't say \"the\" in first sentence, but otherwise says the 2nd sentence correctly. States 7 words without repeat in one minute)  - Abstraction: 2/2  - Orientation: 5/6 (doesn't recall city today)       Cranial Nerves: Visual fields intact today. PERRL. EOMI with normal smooth pursuit. Facial sensation intact/symmetric. Facial movements symmetric. Hearing not formally tested but intact to conversation.      Motor: No tremors or other abnormal movements observed. Muscle tone normal throughout. No pronator drift. Normal/symmetric rapid finger tapping. Strength 5/5 throughout upper and lower extremities, except for HF was 4+/5 b/l.  Finger and wrist movements in both flexion and extension were with give-way weakness (possibly negative myoclonus).     Deep Tendon Reflexes: 2+/symmetric throughout upper extremities, but absent in " "patellar and achilles b/l.      Sensory: no report of vibration sensation or pinprick differentiation in toes, MM, or mid-leg as well as fingers and wrist.  Vibration is reported as absent at toes, ankles, mid-leg but present at knees. Vibration is absent in fingers but present at elbows b/l.       Coordination: Rapid alternating movements intact/symmetric with normal speed and rhythm.     Gait: In wheelchair today.         Data: Pertinent prior to visit   New testing:   CT head 9/14/2023:  Normal but large degree of motion artifact    12/2/2022: MRI brain w/out contrast: It was without infarction, tumor, or regional lesion. There were no signs of mamillary body atrophy/injury, and no thalamic related changes b/l.  Minor scattered microvascular ischemic changes were noted.    12/4/2022:  GAD65 was positive 0.11     Copied from 9/11/2023 visit with neuromuscular provider:  \"Prior pertinent laboratory work-up:  5/2020:  ANH 1:160. Vitamin B1 low (45). B12 low/normal (285). Negative/normal double stranded DNA, ANCA, C-reactive, RF, GM1, GD1, Gq1b, vitamin A, B6, Vit E, SSA, SSB undetectable.  6/2020 CSF: 0 WBC, 0 RBC, protein 58 glucose 29.  7/2020: Normal Vitamin B1, B12, folate    8/2020: TS-HDS mildly elevated at 10921 (N<05662). Negative FGFR3, Immunofixation, paraneoplastic panel,GD1a.  12/20: B1 low (66)  1/2021:  B1 and B6 slightly elevated (B1 = 193, B6 = 182). Normal B12  3/21: Hba1c 5.0  6/21: Normal B6, SSa, SSb, serum IF (no monoclonal protein)  1/26/23: sNFL elevated at 19.4 (N 0-1.87).      Prior electrophysiologic work-up:  7/23/20 NCS/EMG showed all absent upper and lower limb SNAPS and all normal upper and lower limb motor responses.   8/3/20 NCS/EMG showed absent right  median, ulnar, and sural sensory studies with radial having attenuated amplitude.   1/13/21: NCS still showed a non length-dependent sensory neuropathy or ganglionopathy, but compared to prior studies performed 8/3/20 and 7/23/20 there " has been unequivocal interval improvement in sensory response amplitudes in the lower > upper limbs.   2/1/23: NCS/EMG showed a non length-dependent sensory predominant  neuropathy or ganglionopathy. There was a modest degree of worsening of most lower limb motor and sensory responses, although all would still be considered with normal limits. In the upper limbs incidental note is again made of a right sided ulnar neuropathy at the elbow.      Prior pertinent imaging work-up:  5/20: MRI brain with and without contrast was essentially normal  5/20: MRI C spine with and without contrast showed no abnormal enhancement in the spinal cord, thecal sac or cervical vertebrae. No spinal canal or neural foraminal narrowing.  4/21: MRI T and LS spine showed acute compression fractures of T6, T10 and T9 vertebrae, which are new compared to 11/5/2020.          Assessment and Plan:   Assessment:  MCI, undefined source (likely multifactorial)   - prior PEA arrest, post-COVID, alcohol abuse, vitamin/nutritional deficiencies  CIDP    The patient's cognitive testing was abnormal today, but not necessarily suggestive for some specific domain pattern of deficits seen in certain conditions like Alzheimer's or Wernicke-Korsakoff related dementia.  It seems unclear to me how the patient came to have a diagnosis of WKS in the setting of an active infection, and what appears to be delirium while switching multiple psychiatric medications after a period of suspected non-compliance and ongoing grief response to a death in the family.  Her prior SLUMS testing was done at a poor time for determining one's natural cognitive baseline, so I don't feel it contributes to her cognitive picture much. It also appears there was no findings of ocular deficits present at time of diagnosis.  Her hallucinations and delirium cleared while in the hospital, with improved management of her psychiatric conditions and other medical conditions (as would be  expected for delirium over some continued cognitive impairment from a brain injury).  At this point, she seems to be at a stable baseline and it may be an excellent time to obtain a neuropsychology test to determine her level and types of cognitive impairment further.  I would also add on a few serum studies given prior abnormalities (GAD65 specifically during hospitalization, prior B1 deficiency).    Plan:  Neuropsychology   B1, Phosph, Mg, MELODY    Follow up in Neurology clinic in 6 months, or should new concerns arise.    BLADE Griffiths D.O.   of Neurology    Total time today (86 min) in this patient encounter was spent on pre-charting, counseling and/or coordination of care.  The patient is in agreement with this plan and has no further questions.

## 2023-10-04 NOTE — LETTER
10/4/2023       RE: Hilaria Bonner  [address]  [place] [address]     Dear Colleague,    Thank you for referring your patient, Hilaria Bonner, to the [facility] NEUROLOGY [facility] [place] at [facility]. Please see a copy of my visit note below.    [facility] Neurology Consultation    Hilaria Bonner MRN# [id number]   Age: 32 year old YOB: 1990     Requesting physician: No ref. provider found  Crissy Madrigal     Reason for Consultation: CIDP      History of Presenting Symptoms:   Hilaria Bonner is a 32 year old female who presents today for evaluation of CIDP.  There is a pertinent medical history of severe MDD, nutritional deficiciencies (5946-0836), CIDP (see below paragraph), DMII, KENDALL, s/p sleeve gastrectomy, cardiac arrest, alcohol abuse, and T9 compression fracture w/out requiring surgical correction.    The patient is followed with Dr. 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  At that last visit, it was noted that in early 2023 her NCS and sNfL were worsened and at that time required escalation of her IVIG.  However, at the visit in 9/2023, it was thought her IVIG could be tapered down if monitoring markers were reducing.      Upon chart review, the patient has a longstanding psychiatric history of major depressive disorder including auditory hallucinations, and sleep disruption. She was seen with her psychiatric providers 3/23/2023 for her management of her symptoms, and during the visit she was noted to be a vague historian and was reporting no significant change  while using her zyprexa or abilify (question as to compliance was mentioned).  A telephone message with patient's PA-C 3/27/2023 indicated that neuropsychology testing was to be done 5/1/2023 through [facility] and a general neurology referral was made. I think this relates to a 3/21/2023 PCP visit were it is noted that the patient was sober with confusion since a hospitalization 3 months prior (11/27/2022 - 12/20/2022) and there was a concern for Wernicke-Korsakoff syndrome.  She was set to have a neuropsychology test with Dr. Rodriguez on 3/20/23, but I cannot see this as occurring.    When looking at her discharge summary 12/20/2022, it seems the patient was admitted with acute sepsis + lactic acidosis, respiratory failure, and CAP but during the admission she was diagnosed with wernicke-Korsakoff from these conditions?  It was also noted she had generalized weakness, deconditioning, and cognitive deficits. Thiamine level s during her visit were normal, as were ammonia levels, but her AST and GGT were elevated.  She was delirious during the hospitalization, which improved as her sepsis improved.  During delirium, she had a SLUMS of 12/30.  Neurology evaluation included EEG, autoimmune encephalopathy panel which were both without major findings.  She was on a large degree of psychiatric medications (aripiprazole, hydroxyzine, prazosin, venlafaxine) on top of chronic pain medications (pregabalin 200 mg TID).    Today, the patent is alone.  She doesn't recall much of her 11/2022 and 12/222 admissions for infection which included delirium  She tells me that she uses a care-coordinator and  to help manage her appointments.  She lives at a nursing home at the moment due to deconditioning, and cognitive difficulties, but overall cannot tell me why she is there in general.  At this time, she doesn't manage medications, her schedule, and most her daily needs are met through services at the living facility.        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 She has quit drinking alcohol at this time.     Medications:   Aripiprazole  Apixiban  Furosemide  Hydroxyzine  Metformin  Olanzapine 2.5 every day PRN, 10 mg at bedtime  Pregabalin 200 mg TID  Thiamine  Topiramate 50 mg BID  Trazodone 50 mg at bedtime PRN  Venlafaxine 225 mg QAM     Physical Exam:   Vitals: /86 (BP Location: Right arm, Patient Position: Sitting, Cuff Size: Adult Large)   Pulse 77   Wt 113.7 kg (250 lb 11.2 oz)   SpO2 100%   BMI 39.27 kg/m     General: Seated comfortably in no acute distress. New nails on, stains on shirt, well groomed. Alone today. Talks in low volume but is emotive in quality. Child like in voice, but not necessarily for entire behavior (mannerisms, quality/content of speech).   HEENT: No paracervical muscle tenderness or tightness.    Neurologic:     Mental Status: Fully alert, attentive and oriented. Speech clear and fluent, no paraphasic errors.   MoCA: 23/30  - Visualspatial/executive: 3/5 (trails with skipping B but otherwise pattern is normal. Clock is round with correct number placement but hand placement is incorrect (asked for ten after eleven and she jamison a short hand at 12 and the long hand at 1)  - Naming: 3/3  - Recall: 3/5 (requires multiple choice for face, and velvet to be recalled)  - Attention: 5/6 (two errors with serial 7's)  - Language: 1/3 (doesn't say \"the\" in first sentence, but otherwise says the 2nd sentence correctly. States 7 words without repeat in one minute)  - Abstraction: 2/2  - Orientation: 5/6 (doesn't recall city today)       Cranial Nerves: Visual fields intact today. PERRL. EOMI with normal smooth pursuit. Facial sensation intact/symmetric. Facial movements symmetric. Hearing not formally tested but intact to conversation.      Motor: No tremors or other abnormal movements observed. Muscle tone normal throughout. No pronator drift. " "Normal/symmetric rapid finger tapping. Strength 5/5 throughout upper and lower extremities, except for HF was 4+/5 b/l.  Finger and wrist movements in both flexion and extension were with give-way weakness (possibly negative myoclonus).     Deep Tendon Reflexes: 2+/symmetric throughout upper extremities, but absent in patellar and achilles b/l.      Sensory: no report of vibration sensation or pinprick differentiation in toes, MM, or mid-leg as well as fingers and wrist.  Vibration is reported as absent at toes, ankles, mid-leg but present at knees. Vibration is absent in fingers but present at elbows b/l.       Coordination: Rapid alternating movements intact/symmetric with normal speed and rhythm.     Gait: In wheelchair today.         Data: Pertinent prior to visit   New testing:   CT head 9/14/2023:  Normal but large degree of motion artifact    12/2/2022: MRI brain w/out contrast: It was without infarction, tumor, or regional lesion. There were no signs of mamillary body atrophy/injury, and no thalamic related changes b/l.  Minor scattered microvascular ischemic changes were noted.    12/4/2022:  GAD65 was positive 0.11     Copied from 9/11/2023 visit with neuromuscular provider:  \"Prior pertinent laboratory work-up:  5/2020:  ANH 1:160. Vitamin B1 low (45). B12 low/normal (285). Negative/normal double stranded DNA, ANCA, C-reactive, RF, GM1, GD1, Gq1b, vitamin A, B6, Vit E, SSA, SSB undetectable.  6/2020 CSF: 0 WBC, 0 RBC, protein 58 glucose 29.  7/2020: Normal Vitamin B1, B12, folate    8/2020: TS-HDS mildly elevated at 50351 (N<72199). Negative FGFR3, Immunofixation, paraneoplastic panel,GD1a.  12/20: B1 low (66)  1/2021:  B1 and B6 slightly elevated (B1 = 193, B6 = 182). Normal B12  3/21: Hba1c 5.0  6/21: Normal B6, SSa, SSb, serum IF (no monoclonal protein)  1/26/23: sNFL elevated at 19.4 (N 0-1.87).      Prior electrophysiologic work-up:  7/23/20 NCS/EMG showed all absent upper and lower limb SNAPS and " all normal upper and lower limb motor responses.   8/3/20 NCS/EMG showed absent right  median, ulnar, and sural sensory studies with radial having attenuated amplitude.   1/13/21: NCS still showed a non length-dependent sensory neuropathy or ganglionopathy, but compared to prior studies performed 8/3/20 and 7/23/20 there has been unequivocal interval improvement in sensory response amplitudes in the lower > upper limbs.   2/1/23: NCS/EMG showed a non length-dependent sensory predominant  neuropathy or ganglionopathy. There was a modest degree of worsening of most lower limb motor and sensory responses, although all would still be considered with normal limits. In the upper limbs incidental note is again made of a right sided ulnar neuropathy at the elbow.      Prior pertinent imaging work-up:  5/20: MRI brain with and without contrast was essentially normal  5/20: MRI C spine with and without contrast showed no abnormal enhancement in the spinal cord, thecal sac or cervical vertebrae. No spinal canal or neural foraminal narrowing.  4/21: MRI T and LS spine showed acute compression fractures of T6, T10 and T9 vertebrae, which are new compared to 11/5/2020.          Assessment and Plan:   Assessment:  MCI, undefined source (likely multifactorial)   - prior PEA arrest, post-COVID, alcohol abuse, vitamin/nutritional deficiencies  CIDP    The patient's cognitive testing was abnormal today, but not necessarily suggestive for some specific domain pattern of deficits seen in certain conditions like Alzheimer's or Wernicke-Korsakoff related dementia.  It seems unclear to me how the patient came to have a diagnosis of WKS in the setting of an active infection, and what appears to be delirium while switching multiple psychiatric medications after a period of suspected non-compliance and ongoing grief response to a death in the family.  Her prior SLUMS testing was done at a poor time for determining one's natural cognitive  baseline, so I don't feel it contributes to her cognitive picture much. It also appears there was no findings of ocular deficits present at time of diagnosis.  Her hallucinations and delirium cleared while in the hospital, with improved management of her psychiatric conditions and other medical conditions (as would be expected for delirium over some continued cognitive impairment from a brain injury).  At this point, she seems to be at a stable baseline and it may be an excellent time to obtain a neuropsychology test to determine her level and types of cognitive impairment further.  I would also add on a few serum studies given prior abnormalities (GAD65 specifically during hospitalization, prior B1 deficiency).    Plan:  Neuropsychology   B1, Phosph, Mg, MELODY    Follow up in Neurology clinic in 6 months, or should new concerns arise.      Total time today (86 min) in this patient encounter was spent on pre-charting, counseling and/or coordination of care.  The patient is in agreement with this plan and has no further questions.      Again, thank you for allowing me to participate in the care of your patient.      Sincerely,    Alexander Griffiths, DO

## 2023-10-04 NOTE — PATIENT INSTRUCTIONS
I don't think you meet criteria for Wernicke-Korsakoff syndrome, but I do think you have some degree of a mild cognitive impairment. This could be from multiple issues relating to prior vitamin deficiencies, psychiatric history, infections, alcohol abuse, and medications (polypharmacy).  I think neuropsychology testing would be a good idea to define your cognitive difficulties further, but overall your exam today is reassuring for lack of progression or continued severe deficits as was reported in prior hospitalizations.    - Neuropsychology testing  - B1, Phosphorous, Magnesium, MELODY repeat

## 2023-10-05 ENCOUNTER — TELEPHONE (OUTPATIENT)
Dept: FAMILY MEDICINE | Facility: CLINIC | Age: 33
End: 2023-10-05
Payer: MEDICARE

## 2023-10-05 NOTE — TELEPHONE ENCOUNTER
RN called number on file for patient. Someone else answered and stated that it was the wrong number and did not belong to patient. No consent on file to speak with anyone else but patient.     Patient is [employer] active and will send Gr[employer] message to patient regarding provider message at this time.     TEN De La Cruz  [facility] Primary Care Triage      ----- Message from Sophia Welsh MD sent at 10/4/2023  8:20 PM CDT -----  Please call patient: Patient of Crissy Madrigal CNP who is out of the office this week.  Appears she was recently hospitalized and is discharged and in TCU currently.  Her labs indicate extremely poorly controlled diabetes, anemia and kidney disease  It is unclear if she is getting primary care at her TCU or if she needs a hospital follow-up with us.  If staff physician is not following her please get her set up ASAP for hospital follow-up and results review within the next week, ideally with Crissy, same-day appointment would be appropriate (prefer 40 min if available)

## 2023-10-07 ENCOUNTER — HEALTH MAINTENANCE LETTER (OUTPATIENT)
Age: 33
End: 2023-10-07

## 2023-10-07 LAB — GAD65 AB SER IA-ACNC: <5 IU/ML

## 2023-10-08 LAB — VIT B1 PYROPHOSHATE BLD-SCNC: 156 NMOL/L

## 2023-10-11 NOTE — RESULT ENCOUNTER NOTE
Fyi to pcp-see phone note on these results.  Patient does not currently have follow-up visit to review results

## 2023-10-18 ENCOUNTER — INFUSION THERAPY VISIT (OUTPATIENT)
Dept: INFUSION THERAPY | Facility: CLINIC | Age: 33
End: 2023-10-18
Attending: PHYSICIAN ASSISTANT
Payer: MEDICARE

## 2023-10-18 VITALS
WEIGHT: 250.69 LBS | RESPIRATION RATE: 16 BRPM | BODY MASS INDEX: 39.35 KG/M2 | OXYGEN SATURATION: 98 % | HEIGHT: 67 IN | TEMPERATURE: 98.9 F | HEART RATE: 91 BPM | DIASTOLIC BLOOD PRESSURE: 90 MMHG | SYSTOLIC BLOOD PRESSURE: 129 MMHG

## 2023-10-18 DIAGNOSIS — G61.81 CHRONIC INFLAMMATORY DEMYELINATING POLYNEUROPATHY (H): Primary | ICD-10-CM

## 2023-10-18 PROCEDURE — 96375 TX/PRO/DX INJ NEW DRUG ADDON: CPT

## 2023-10-18 PROCEDURE — 250N000013 HC RX MED GY IP 250 OP 250 PS 637: Performed by: PSYCHIATRY & NEUROLOGY

## 2023-10-18 PROCEDURE — 250N000011 HC RX IP 250 OP 636: Performed by: PSYCHIATRY & NEUROLOGY

## 2023-10-18 PROCEDURE — 96365 THER/PROPH/DIAG IV INF INIT: CPT

## 2023-10-18 PROCEDURE — 96366 THER/PROPH/DIAG IV INF ADDON: CPT

## 2023-10-18 PROCEDURE — 258N000003 HC RX IP 258 OP 636: Performed by: PSYCHIATRY & NEUROLOGY

## 2023-10-18 RX ORDER — MEPERIDINE HYDROCHLORIDE 25 MG/ML
25 INJECTION INTRAMUSCULAR; INTRAVENOUS; SUBCUTANEOUS EVERY 30 MIN PRN
Status: CANCELLED | OUTPATIENT
Start: 2023-10-18

## 2023-10-18 RX ORDER — HEPARIN SODIUM (PORCINE) LOCK FLUSH IV SOLN 100 UNIT/ML 100 UNIT/ML
5 SOLUTION INTRAVENOUS
Status: DISCONTINUED | OUTPATIENT
Start: 2023-10-18 | End: 2023-10-18 | Stop reason: HOSPADM

## 2023-10-18 RX ORDER — ALBUTEROL SULFATE 90 UG/1
1-2 AEROSOL, METERED RESPIRATORY (INHALATION)
Status: DISCONTINUED | OUTPATIENT
Start: 2023-10-18 | End: 2023-10-18

## 2023-10-18 RX ORDER — EPINEPHRINE 1 MG/ML
0.3 INJECTION, SOLUTION INTRAMUSCULAR; SUBCUTANEOUS EVERY 5 MIN PRN
Status: CANCELLED | OUTPATIENT
Start: 2023-10-18

## 2023-10-18 RX ORDER — DIPHENHYDRAMINE HCL 25 MG
50 CAPSULE ORAL ONCE
Status: COMPLETED | OUTPATIENT
Start: 2023-10-18 | End: 2023-10-18

## 2023-10-18 RX ORDER — DIPHENHYDRAMINE HCL 25 MG
50 CAPSULE ORAL ONCE
Status: CANCELLED | OUTPATIENT
Start: 2023-10-18

## 2023-10-18 RX ORDER — METHYLPREDNISOLONE SODIUM SUCCINATE 125 MG/2ML
125 INJECTION, POWDER, LYOPHILIZED, FOR SOLUTION INTRAMUSCULAR; INTRAVENOUS
Status: CANCELLED
Start: 2023-10-18

## 2023-10-18 RX ORDER — ACETAMINOPHEN 325 MG/1
650 TABLET ORAL ONCE
Status: CANCELLED
Start: 2023-10-18

## 2023-10-18 RX ORDER — DIPHENHYDRAMINE HYDROCHLORIDE 50 MG/ML
50 INJECTION INTRAMUSCULAR; INTRAVENOUS
Status: DISCONTINUED | OUTPATIENT
Start: 2023-10-18 | End: 2023-10-18

## 2023-10-18 RX ORDER — METHYLPREDNISOLONE SODIUM SUCCINATE 40 MG/ML
20 INJECTION, POWDER, LYOPHILIZED, FOR SOLUTION INTRAMUSCULAR; INTRAVENOUS ONCE
Status: CANCELLED
Start: 2023-11-15 | End: 2023-10-18

## 2023-10-18 RX ORDER — ALBUTEROL SULFATE 90 UG/1
1-2 AEROSOL, METERED RESPIRATORY (INHALATION)
Status: CANCELLED
Start: 2023-10-18

## 2023-10-18 RX ORDER — DIPHENHYDRAMINE HYDROCHLORIDE 50 MG/ML
50 INJECTION INTRAMUSCULAR; INTRAVENOUS
Status: CANCELLED
Start: 2023-10-18

## 2023-10-18 RX ORDER — ALBUTEROL SULFATE 0.83 MG/ML
2.5 SOLUTION RESPIRATORY (INHALATION)
Status: CANCELLED | OUTPATIENT
Start: 2023-10-18

## 2023-10-18 RX ORDER — HEPARIN SODIUM,PORCINE 10 UNIT/ML
5 VIAL (ML) INTRAVENOUS
Status: DISCONTINUED | OUTPATIENT
Start: 2023-10-18 | End: 2023-10-18 | Stop reason: HOSPADM

## 2023-10-18 RX ORDER — MEPERIDINE HYDROCHLORIDE 25 MG/ML
25 INJECTION INTRAMUSCULAR; INTRAVENOUS; SUBCUTANEOUS EVERY 30 MIN PRN
Status: DISCONTINUED | OUTPATIENT
Start: 2023-10-18 | End: 2023-10-18

## 2023-10-18 RX ORDER — EPINEPHRINE 1 MG/ML
0.3 INJECTION, SOLUTION INTRAMUSCULAR; SUBCUTANEOUS EVERY 5 MIN PRN
Status: DISCONTINUED | OUTPATIENT
Start: 2023-10-18 | End: 2023-10-18

## 2023-10-18 RX ORDER — ALBUTEROL SULFATE 0.83 MG/ML
2.5 SOLUTION RESPIRATORY (INHALATION)
Status: DISCONTINUED | OUTPATIENT
Start: 2023-10-18 | End: 2023-10-18

## 2023-10-18 RX ORDER — METHYLPREDNISOLONE SODIUM SUCCINATE 40 MG/ML
20 INJECTION, POWDER, LYOPHILIZED, FOR SOLUTION INTRAMUSCULAR; INTRAVENOUS ONCE
Status: COMPLETED | OUTPATIENT
Start: 2023-10-18 | End: 2023-10-18

## 2023-10-18 RX ORDER — ACETAMINOPHEN 325 MG/1
650 TABLET ORAL ONCE
Status: COMPLETED | OUTPATIENT
Start: 2023-10-18 | End: 2023-10-18

## 2023-10-18 RX ORDER — HEPARIN SODIUM (PORCINE) LOCK FLUSH IV SOLN 100 UNIT/ML 100 UNIT/ML
5 SOLUTION INTRAVENOUS
Status: CANCELLED | OUTPATIENT
Start: 2023-10-18

## 2023-10-18 RX ORDER — HEPARIN SODIUM,PORCINE 10 UNIT/ML
5 VIAL (ML) INTRAVENOUS
Status: CANCELLED | OUTPATIENT
Start: 2023-10-18

## 2023-10-18 RX ORDER — METHYLPREDNISOLONE SODIUM SUCCINATE 125 MG/2ML
125 INJECTION, POWDER, LYOPHILIZED, FOR SOLUTION INTRAMUSCULAR; INTRAVENOUS
Status: DISCONTINUED | OUTPATIENT
Start: 2023-10-18 | End: 2023-10-18

## 2023-10-18 RX ADMIN — SODIUM CHLORIDE 250 ML: 9 INJECTION, SOLUTION INTRAVENOUS at 08:28

## 2023-10-18 RX ADMIN — DIPHENHYDRAMINE HYDROCHLORIDE 50 MG: 25 CAPSULE ORAL at 08:28

## 2023-10-18 RX ADMIN — HUMAN IMMUNOGLOBULIN G 60 G: 40 LIQUID INTRAVENOUS at 10:50

## 2023-10-18 RX ADMIN — HUMAN IMMUNOGLOBULIN G 60 G: 40 LIQUID INTRAVENOUS at 08:56

## 2023-10-18 RX ADMIN — ACETAMINOPHEN 650 MG: 325 TABLET, FILM COATED ORAL at 08:28

## 2023-10-18 RX ADMIN — METHYLPREDNISOLONE SODIUM SUCCINATE 20 MG: 40 INJECTION, POWDER, FOR SOLUTION INTRAMUSCULAR; INTRAVENOUS at 08:28

## 2023-10-18 NOTE — PROGRESS NOTES
Infusion Nursing Note:  Hilaria Bonner presents today for IVIG infusion.    Patient seen by provider today: No   present during visit today: Not Applicable.    Note: Pt presents today for IVIG infusion.  Tolerated infusion without incident.      Intravenous Access:  Peripheral IV placed.    Treatment Conditions:  Not Applicable.      Post Infusion Assessment:  Patient tolerated infusion without incident.  Blood return noted pre and post infusion.  Site patent and intact, free from redness, edema or discomfort.  No evidence of extravasations.  Access discontinued per protocol.       Discharge Plan:   Patient declined prescription refills.  Discharge instructions reviewed with: Patient.  Patient and/or family verbalized understanding of discharge instructions and all questions answered.  Copy of AVS reviewed with patient and/or family.  Patient will return 11/15/23 for next infusion appointment.  Patient discharged in stable condition accompanied by: self.  Departure Mode: Ambulatory and Wheelchair.      Tiffanie Mcintosh RN

## 2023-10-20 ENCOUNTER — OFFICE VISIT (OUTPATIENT)
Dept: OBGYN | Facility: CLINIC | Age: 33
End: 2023-10-20
Payer: MEDICARE

## 2023-10-20 VITALS
TEMPERATURE: 98.9 F | DIASTOLIC BLOOD PRESSURE: 95 MMHG | HEART RATE: 73 BPM | BODY MASS INDEX: 38.65 KG/M2 | SYSTOLIC BLOOD PRESSURE: 138 MMHG | WEIGHT: 246.8 LBS

## 2023-10-20 DIAGNOSIS — R87.810 CERVICAL HIGH RISK HPV (HUMAN PAPILLOMAVIRUS) TEST POSITIVE: ICD-10-CM

## 2023-10-20 DIAGNOSIS — L29.2 VULVAR PRURITUS: Primary | ICD-10-CM

## 2023-10-20 PROCEDURE — 99213 OFFICE O/P EST LOW 20 MIN: CPT

## 2023-10-20 PROCEDURE — 87624 HPV HI-RISK TYP POOLED RSLT: CPT

## 2023-10-20 PROCEDURE — G0145 SCR C/V CYTO,THINLAYER,RESCR: HCPCS

## 2023-10-20 RX ORDER — DIAPER,BRIEF,INFANT-TODD,DISP
EACH MISCELLANEOUS PRN
Qty: 30 G | Refills: 1 | Status: SHIPPED | OUTPATIENT
Start: 2023-10-20

## 2023-10-20 NOTE — PROGRESS NOTES
CC: pap smear, pelvic exam  S: Hilaria is a 31 yo  New pt here today for a pelvic exam, update pap smear, and to discuss a skin concern. Hilaria is feeling well, denies need for STI/STD testing. Denies vaginal bleeding or pain. Denies any abnormal discharge. She does recall a hx of irregular pap smears, but due to limited memory can not recall what they were or if she required any tx. She describes her skin concern and external upper right itching. She reports occasionally seeing dry skin, she reports she does occasionally wear pull ups. Otherwise would like to discuss having mirena IUD replaced, used in the past with good satisfaction, desires something to decrease her periods.    O:BP (!) 138/95   Pulse 73   Temp 98.9  F (37.2  C)   Wt 111.9 kg (246 lb 12.8 oz)   LMP  (LMP Unknown)   BMI 38.65 kg/m    Past Medical History:   Diagnosis Date    Acute kidney injury (H24) 2019    Acute massive pulmonary embolism (H) 2019    Acute pancreatitis 2018    Acute pancreatitis 2021    due to ETOH    Acute thoracic back pain 2021    ARAMIS (acute kidney injury) (H24) 2019    Cardiac arrest, cause unspecified (H) 2019    massive pulmonary embolism with pulseless electrical activity cardiac arrest in May 2019 following gastric bypass in 2019     Depression with anxiety     GERD (gastroesophageal reflux disease)     History of alcohol use disorder     HTN (hypertension)     Infection due to 2019 novel coronavirus 2020    COVID19 infection in 2020    Ischemic colitis (H24) 2019    Morbid obesity (H)     Scalp laceration, initial encounter 2020     Past Surgical History:   Procedure Laterality Date     SECTION      COLONOSCOPY N/A 2022    Procedure: COLONOSCOPY;  Surgeon: Chandrakant Toledo MD;  Location:  GI    ESOPHAGOSCOPY, GASTROSCOPY, DUODENOSCOPY (EGD), COMBINED N/A 2022    Procedure: ESOPHAGOGASTRODUODENOSCOPY, WITH BIOPSY;   Surgeon: Chandrakant Toledo MD;  Location: [facility]    EXAM UNDER ANESTHESIA ANUS N/A 8/3/2022    Procedure: EXAM UNDER ANESTHESIA, ANUS;  Surgeon: Chip Way MD;  Location: [facility] OR    HEMORRHOIDECTOMY EXTERNAL N/A 8/3/2022    Procedure: HEMORRHOIDECTOMY, EXTERNAL;  Surgeon: Chip Way MD;  Location: [facility] OR    LAPAROSCOPIC GASTRIC SLEEVE N/A 03/26/2019    Procedure: Laparoscopic Sleeve Gastrectomy;  Surgeon: Luan Lopez MD;  Location: [facility] OR    ORTHOPEDIC SURGERY      2 knee meniscus surgery     Current Outpatient Medications   Medication    apixaban ANTICOAGULANT (ELIQUIS) 5 MG tablet    folic acid (FOLVITE) 1 MG tablet    hydrocortisone (CORTAID) 1 % external ointment    melatonin 3 MG tablet    OLANZapine (ZYPREXA) 10 MG tablet    omeprazole (PRILOSEC) 20 MG DR capsule    prazosin (MINIPRESS) 1 MG capsule    venlafaxine (EFFEXOR XR) 150 MG 24 hr capsule    venlafaxine (EFFEXOR-ER) 75 MG 24 hr tablet    ammonium lactate (AMLACTIN) 12 % external cream    ARIPiprazole (ABILIFY) 10 MG tablet    blood glucose (NO BRAND SPECIFIED) lancets standard    blood glucose calibration (NO BRAND SPECIFIED) solution    blood glucose monitoring (NO BRAND SPECIFIED) meter device kit    Calcium Citrate-Vitamin D 500-10 MG-MCG CHEW    ferrous sulfate (FEROSUL) 325 (65 Fe) MG tablet    furosemide (LASIX) 40 MG tablet    hydrOXYzine (ATARAX) 25 MG tablet    ketoconazole (NIZORAL) 2 % external cream    metFORMIN (GLUCOPHAGE XR) 500 MG 24 hr tablet    multivitamin (CENTRUM) chewable tablet    OLANZapine (ZYPREXA) 2.5 MG tablet    polyethylene glycol (MIRALAX) 17 GM/Dose powder    potassium chloride ER (K-TAB/KLOR-CON) 10 MEQ CR tablet    Pregabalin (LYRICA) 200 MG capsule    thiamine (B-1) 100 MG tablet    topiramate (TOPAMAX) 50 MG tablet    traZODone (DESYREL) 50 MG tablet    vitamin B complex with vitamin C (VITAMIN  B COMPLEX) tablet    vitamin D3 (CHOLECALCIFEROL) 50 mcg (2000 units) tablet     No  current facility-administered medications for this visit.      No Known Allergies  Alert pleasant female NAD  Pelvic Exam:  Vulva: No external lesions, normal hair distribution, no adenopathy  Vagina: Moist, pink, no abnormal discharge, well rugated, no lesions  Cervix: Pap smear is taken, parous, smooth, pink, no visible lesions  Uterus: Normal size, anteverted, non-tender, mobile  Ovaries: No mass, non-tender, mobile  External skin appears dry     A:Hilaria is a 31 yo  New pt here today for a pelvic exam, update pap smear, and to discuss a skin concern.  P:  -pap smear updated  -skin concern appears most consistent with contact irritation, recc cotton underwear, if needing to wear briefs, change if wet or soiled frequently to prevent skin irritation and breakdown, consider using chux pad to give skin time to breathe, hydrocortisone ointment given for external use PRN for itching  -Hilaria has significant PMH of cardiac arrest after PE, estrogen containing agents contraindicated, she desires Mirena type IUD placed to reduce periods. Discussed apt and procedure for placement of IUD. Discussed recommendations of taking tylenol and ibuprofen 1 hr before apt for premedication, reports having satisfaction with mirena IUD in past  1. Vulvar pruritus  - hydrocortisone (CORTAID) 1 % external ointment; Apply topically as needed for itching (apply externally no more than twice daily for external vulvar itching and irritation)  Dispense: 30 g; Refill: 1    2. Cervical high risk HPV (human papillomavirus) test positive  - Pap imaged thin layer screen with HPV - recommended age 30 - 65 years (select HPV order below)    Rtc for mirena IUD placement.  RG Fletcher CNP

## 2023-10-23 RX ORDER — TOPIRAMATE 50 MG/1
50 TABLET, FILM COATED ORAL 2 TIMES DAILY
OUTPATIENT
Start: 2023-10-23

## 2023-10-23 RX ORDER — POLYETHYLENE GLYCOL 3350 17 G/17G
17 POWDER, FOR SOLUTION ORAL DAILY
OUTPATIENT
Start: 2023-10-23

## 2023-10-23 RX ORDER — PREGABALIN 200 MG/1
CAPSULE ORAL
Qty: 270 CAPSULE | Refills: 0 | OUTPATIENT
Start: 2023-10-23

## 2023-10-23 RX ORDER — LANOLIN ALCOHOL/MO/W.PET/CERES
100 CREAM (GRAM) TOPICAL 3 TIMES DAILY
OUTPATIENT
Start: 2023-10-23

## 2023-10-24 LAB
BKR LAB AP GYN ADEQUACY: NORMAL
BKR LAB AP GYN INTERPRETATION: NORMAL
BKR LAB AP HPV REFLEX: NORMAL
BKR LAB AP PREVIOUS ABNL DX: NORMAL
BKR LAB AP PREVIOUS ABNORMAL: NORMAL
PATH REPORT.COMMENTS IMP SPEC: NORMAL
PATH REPORT.COMMENTS IMP SPEC: NORMAL
PATH REPORT.RELEVANT HX SPEC: NORMAL

## 2023-10-26 ENCOUNTER — PATIENT OUTREACH (OUTPATIENT)
Dept: OBGYN | Facility: CLINIC | Age: 33
End: 2023-10-26
Payer: MEDICARE

## 2023-10-26 LAB
HUMAN PAPILLOMA VIRUS 16 DNA: NEGATIVE
HUMAN PAPILLOMA VIRUS 18 DNA: NEGATIVE
HUMAN PAPILLOMA VIRUS FINAL DIAGNOSIS: NORMAL
HUMAN PAPILLOMA VIRUS OTHER HR: NEGATIVE

## 2023-11-06 ENCOUNTER — OFFICE VISIT (OUTPATIENT)
Dept: OBGYN | Facility: CLINIC | Age: 33
End: 2023-11-06
Payer: MEDICARE

## 2023-11-06 VITALS
DIASTOLIC BLOOD PRESSURE: 89 MMHG | OXYGEN SATURATION: 100 % | HEART RATE: 87 BPM | WEIGHT: 249.2 LBS | SYSTOLIC BLOOD PRESSURE: 138 MMHG | BODY MASS INDEX: 39.02 KG/M2

## 2023-11-06 DIAGNOSIS — Z23 HIGH PRIORITY FOR 2019-NCOV VACCINE: ICD-10-CM

## 2023-11-06 DIAGNOSIS — Z30.430 ENCOUNTER FOR INSERTION OF MIRENA IUD: Primary | ICD-10-CM

## 2023-11-06 DIAGNOSIS — Z30.430 ENCOUNTER FOR INSERTION OF INTRAUTERINE CONTRACEPTIVE DEVICE: ICD-10-CM

## 2023-11-06 DIAGNOSIS — Z23 NEED FOR HPV VACCINATION: ICD-10-CM

## 2023-11-06 LAB — HCG UR QL: NEGATIVE

## 2023-11-06 PROCEDURE — 81025 URINE PREGNANCY TEST: CPT

## 2023-11-06 PROCEDURE — 90480 ADMN SARSCOV2 VAC 1/ONLY CMP: CPT

## 2023-11-06 PROCEDURE — 58300 INSERT INTRAUTERINE DEVICE: CPT

## 2023-11-06 PROCEDURE — 90651 9VHPV VACCINE 2/3 DOSE IM: CPT

## 2023-11-06 PROCEDURE — 90471 IMMUNIZATION ADMIN: CPT

## 2023-11-06 PROCEDURE — 91320 SARSCV2 VAC 30MCG TRS-SUC IM: CPT

## 2023-11-06 ASSESSMENT — PATIENT HEALTH QUESTIONNAIRE - PHQ9: SUM OF ALL RESPONSES TO PHQ QUESTIONS 1-9: 0

## 2023-11-06 NOTE — PROGRESS NOTES
IUD Insertion:  CONSULT:    Is a pregnancy test required: Yes.  Was it positive or negative?  Negative  Was a consent obtained?  Yes    Subjective: Hilaria Bonner is a 32 year old  presents for IUD and desires Mirena type IUD.    Patient has been given the opportunity to ask questions about all forms of birth control, including all options appropriate for Hilaria Bonner. Discussed that no method of birth control, except abstinence is 100% effective against pregnancy or sexually transmitted infection.     Hilaria Bonner understands she may have the IUD removed at any time. IUD should be removed by a health care provider.    The entire insertion procedure was reviewed with the patient, including care after placement.    LMP 10/24/23 Last sexual activity: abstinent . No allergy to betadine or shellfish. Patient declines STD screening  HCG Qual Urine   Date Value Ref Range Status   2021 Negative NEG^Negative Final     Comment:     This test is for screening purposes.  Results should be interpreted along with   the clinical picture.  Confirmation testing is available if warranted by   ordering CEE742, HCG Quantitative Pregnancy.       hCG Urine Qualitative   Date Value Ref Range Status   2022 Negative Negative Final     Comment:     This test is for screening purposes.  Results should be interpreted along with the clinical picture.  Confirmation testing is available if warranted by ordering [id number], HCG Quantitative Pregnancy.         LMP  (LMP Unknown)     Pelvic Exam:   EG/BUS: normal genital architecture without lesions, erythema or abnormal secretions.   Vagina: moist, pink, rugae with physiologic discharge and secretions  Cervix: multiparous no lesions and pink, moist, closed, without lesion or CMT  Uterus: midposition, mobile, no pain  Adnexa: within normal limits and no masses, nodularity, tenderness    PROCEDURE NOTE: -- IUD Insertion    Reason for Insertion: contraception and abnormal  uterine bleeding    Premedicated with ibuprofen and tylenol.  Under sterile technique, cervix was visualized with speculum and prepped with Betadine solution swab x 3. Tenaculum was placed for stability. The uterus was gently straightened and sounded to 8.0 cm. IUD prepared for placement, and IUD inserted according to 's instructions without difficulty or significant resitance, and deployed at the fundus. The strings were visualized and trimmed to 2.5 cm from the external os. Tenaculum was removed and hemostasis noted. Speculum removed.  Patient tolerated procedure well.    Lot # [id number]  Exp: 10/31/2025    EBL: minimal    Complications: none    ASSESSMENT:     ICD-10-CM    1. Encounter for insertion of mirena IUD  Z30.430 levonorgestrel (MIRENA) 52 MG (20 mcg/day) IUD 1 each     INSERTION INTRAUTERINE DEVICE      2. Encounter for insertion of intrauterine contraceptive device  Z30.430 levonorgestrel (MIRENA) 52 MG (20 mcg/day) IUD 1 each     INSERTION INTRAUTERINE DEVICE             PLAN:    Given 's handouts, including when to have IUD removed, list of danger s/sx, side effects and follow up recommended. Encouraged condom use for prevention of STD. Back up contraception advised for 7 days if progestin method. Advised to call for any fever, for prolonged or severe pain or bleeding, abnormal vaginal discharge, or unable to palpate strings. She was advised to use pain medications (ibuprofen) as needed for mild to moderate pain. Advised to follow-up in clinic in 4-6 weeks for IUD string check if unable to find strings or as directed by provider.     RG Fletcher CNP

## 2023-11-07 ENCOUNTER — TELEPHONE (OUTPATIENT)
Dept: PHARMACY | Facility: CLINIC | Age: 33
End: 2023-11-07
Payer: MEDICARE

## 2023-11-07 NOTE — TELEPHONE ENCOUNTER
IVIG Stewardship Program    Called patient on 11/7/23 to introduce them to the IVIG Stewardship Program, and answer any questions or concerns they might have about their IVIG therapy.     Left direct line callback number.     Marcela Sanchez, PharmD  PGY-1 Pharmacy Resident  [phone]

## 2023-11-08 ENCOUNTER — TELEPHONE (OUTPATIENT)
Dept: PHARMACY | Facility: CLINIC | Age: 33
End: 2023-11-08
Payer: MEDICARE

## 2023-11-08 NOTE — TELEPHONE ENCOUNTER
Pharmacist IVIG Stewardship Program  Diagnosis: CIDP  EMG/ NCS completed on 7/23/2020  HPI  In April 2020 she developed confirmed COVID infection. About 4 weeks later her neurologic symptoms began. Her first symptom was tingling in her hands followed within days burning in her hands.  It then quickly progressed to involving bilateral legs below the knee and then her feet. She felt weak in her hands and feet. Fine motor tasks and gait became impaired. She has trouble picking up objects because she has to watch herself  objects. She needed a walker. She also felt that her speech became periodically slurred. The sensory symptoms that included pain and allodynia were most problematic in her hands and feet.     Current Dosing Regimen: Privigen 60g IV every 4 weeks (based on a dosage of 1g/kg based on an ideal body weight of 61.6kg)  Pre-medications:  Acetaminophen 650mg by mouth 30 minutes prior to infusion   Diphenhydramine 50mg by mouth 30 minutes prior to infusion   Methylprednisolone 20mg IV prior to infusion   Current Titration Regimen: Start infusion at 0.5 ml/kg/hr x 15 min. If tolerated, increase rate by 0.5 ml/kg/hr every 15 min to a maximum of 4 ml/kg/hr.  Previously Tried Ig Products: None  Previous Treatment Failures: None    Side Effects: Patient denies side effects such as headaches, flushing, fever, muscle or joint pain, nausea, or rash/itching.    Interventions: Chart Review    Assessment:  In early 2023 noted that there was some modest interval NCS worsening and sNfL was elevated, suggesting an active component of the neuropathy. IVIG was subsequently escalated based on those findings. Since then, she was clinically and functionally doing well. IVIG was escalated based in part on those findings. IVIG was reduced again to every 4 weeks.   Spoke to patient on 11/8/23, she reports that she still experiences some numbness and tingling in her hands but overall has remained stable. She reports no  questions or concerns with her IVIG therapy. Patient states that since increasing frequency to infusing every 4 weeks, she has remained clinically stable.    9/11/23 1/25/23 5/9/22 8/4/21 6/2/21 3/31/21 1/13/21   RODS Score 37 38 31 34 22 33    Time for 'Up and Go' test seconds 11.25  9.1 8.9  25.7    Right Hand  Strength  26 16 23 25 16 24 25   Left Hand  Strength  28 24 25 30 14 28 28   Time since last IVIG   2.5 weeks  1 day  1 week  27 days  1 week    IVIG Dose  1g/kg every 3 weeks   1g/kg every 3 weeks 1g/kg every 3 weeks 1g/kg every 4 weeks 1g/kg every 4 weeks 1g/kg every 3 weeks       Dose Reduction Attempt: Per chart review, in 1/21 she reduced IVIG from 1 gm/kg q 3 weeks to q 4 week. In 2021 her neuropathy was stable, but she developed thoracic compression fractures, and gait became limited by pain. In the spring and summer 2021 she reported worsening sensation and gait. Outcomes 6/2/21 showed a marked drop off in  strength and I-RODS. In 6/21 we increased IVIG to 1 gm/kg q 3 weeks. She stabilized through the end of 2021. In mid-2022 we reduced IVIG back to 1 gm/kg q 4 weeks. Plan to continue taper if patient remains stable.      Plan: Patient is okay to proceed with infusions as planned with continued assessment      Next Review Needed: 11/7/24    Marcela Sanchez, PharmD  PGY-1 Pharmacy Resident

## 2023-11-15 ENCOUNTER — INFUSION THERAPY VISIT (OUTPATIENT)
Dept: INFUSION THERAPY | Facility: CLINIC | Age: 33
End: 2023-11-15
Attending: PHYSICIAN ASSISTANT
Payer: MEDICARE

## 2023-11-15 VITALS
SYSTOLIC BLOOD PRESSURE: 118 MMHG | TEMPERATURE: 98.3 F | WEIGHT: 241 LBS | BODY MASS INDEX: 37.74 KG/M2 | DIASTOLIC BLOOD PRESSURE: 79 MMHG | OXYGEN SATURATION: 99 % | RESPIRATION RATE: 16 BRPM | HEART RATE: 89 BPM

## 2023-11-15 DIAGNOSIS — G61.81 CHRONIC INFLAMMATORY DEMYELINATING POLYNEUROPATHY (H): Primary | ICD-10-CM

## 2023-11-15 PROCEDURE — 250N000013 HC RX MED GY IP 250 OP 250 PS 637: Performed by: PSYCHIATRY & NEUROLOGY

## 2023-11-15 PROCEDURE — 250N000011 HC RX IP 250 OP 636: Performed by: PSYCHIATRY & NEUROLOGY

## 2023-11-15 PROCEDURE — 258N000003 HC RX IP 258 OP 636: Performed by: PSYCHIATRY & NEUROLOGY

## 2023-11-15 PROCEDURE — 96366 THER/PROPH/DIAG IV INF ADDON: CPT

## 2023-11-15 PROCEDURE — 96365 THER/PROPH/DIAG IV INF INIT: CPT

## 2023-11-15 PROCEDURE — 96375 TX/PRO/DX INJ NEW DRUG ADDON: CPT

## 2023-11-15 RX ORDER — METHYLPREDNISOLONE SODIUM SUCCINATE 40 MG/ML
20 INJECTION, POWDER, LYOPHILIZED, FOR SOLUTION INTRAMUSCULAR; INTRAVENOUS ONCE
Start: 2023-11-15 | End: 2023-11-15

## 2023-11-15 RX ORDER — HEPARIN SODIUM (PORCINE) LOCK FLUSH IV SOLN 100 UNIT/ML 100 UNIT/ML
5 SOLUTION INTRAVENOUS
OUTPATIENT
Start: 2023-11-15

## 2023-11-15 RX ORDER — HEPARIN SODIUM,PORCINE 10 UNIT/ML
5 VIAL (ML) INTRAVENOUS
OUTPATIENT
Start: 2023-11-15

## 2023-11-15 RX ORDER — ACETAMINOPHEN 325 MG/1
650 TABLET ORAL ONCE
Start: 2023-11-15

## 2023-11-15 RX ORDER — ACETAMINOPHEN 325 MG/1
650 TABLET ORAL ONCE
Status: COMPLETED | OUTPATIENT
Start: 2023-11-15 | End: 2023-11-15

## 2023-11-15 RX ORDER — EPINEPHRINE 1 MG/ML
0.3 INJECTION, SOLUTION INTRAMUSCULAR; SUBCUTANEOUS EVERY 5 MIN PRN
OUTPATIENT
Start: 2023-11-15

## 2023-11-15 RX ORDER — ALBUTEROL SULFATE 0.83 MG/ML
2.5 SOLUTION RESPIRATORY (INHALATION)
OUTPATIENT
Start: 2023-11-15

## 2023-11-15 RX ORDER — MEPERIDINE HYDROCHLORIDE 25 MG/ML
25 INJECTION INTRAMUSCULAR; INTRAVENOUS; SUBCUTANEOUS EVERY 30 MIN PRN
OUTPATIENT
Start: 2023-11-15

## 2023-11-15 RX ORDER — METHYLPREDNISOLONE SODIUM SUCCINATE 125 MG/2ML
125 INJECTION, POWDER, LYOPHILIZED, FOR SOLUTION INTRAMUSCULAR; INTRAVENOUS
Start: 2023-11-15

## 2023-11-15 RX ORDER — DIPHENHYDRAMINE HCL 25 MG
50 CAPSULE ORAL ONCE
Status: COMPLETED | OUTPATIENT
Start: 2023-11-15 | End: 2023-11-15

## 2023-11-15 RX ORDER — DIPHENHYDRAMINE HCL 25 MG
50 CAPSULE ORAL ONCE
OUTPATIENT
Start: 2023-11-15

## 2023-11-15 RX ORDER — METHYLPREDNISOLONE SODIUM SUCCINATE 40 MG/ML
20 INJECTION, POWDER, LYOPHILIZED, FOR SOLUTION INTRAMUSCULAR; INTRAVENOUS ONCE
Status: COMPLETED | OUTPATIENT
Start: 2023-11-15 | End: 2023-11-15

## 2023-11-15 RX ORDER — DIPHENHYDRAMINE HYDROCHLORIDE 50 MG/ML
50 INJECTION INTRAMUSCULAR; INTRAVENOUS
Start: 2023-11-15

## 2023-11-15 RX ORDER — ALBUTEROL SULFATE 90 UG/1
1-2 AEROSOL, METERED RESPIRATORY (INHALATION)
Start: 2023-11-15

## 2023-11-15 RX ADMIN — SODIUM CHLORIDE 250 ML: 9 INJECTION, SOLUTION INTRAVENOUS at 12:01

## 2023-11-15 RX ADMIN — DIPHENHYDRAMINE HYDROCHLORIDE 50 MG: 25 CAPSULE ORAL at 11:51

## 2023-11-15 RX ADMIN — ACETAMINOPHEN 650 MG: 325 TABLET, FILM COATED ORAL at 11:51

## 2023-11-15 RX ADMIN — HUMAN IMMUNOGLOBULIN G 60 G: 40 LIQUID INTRAVENOUS at 12:07

## 2023-11-15 RX ADMIN — METHYLPREDNISOLONE SODIUM SUCCINATE 20 MG: 40 INJECTION, POWDER, FOR SOLUTION INTRAMUSCULAR; INTRAVENOUS at 12:02

## 2023-11-15 ASSESSMENT — PAIN SCALES - GENERAL: PAINLEVEL: NO PAIN (0)

## 2023-11-15 NOTE — PROGRESS NOTES
Infusion Nursing Note:  Hilaria Gonzalezee presents today for IVIG.    Patient seen by provider today: No   present during visit today: Not Applicable.    Note: N/A.      Intravenous Access:  Peripheral IV placed.    Treatment Conditions:  Not Applicable.      Post Infusion Assessment:  Patient tolerated infusion without incident.  Blood return noted pre and post infusion.  Site patent and intact, free from redness, edema or discomfort.  No evidence of extravasations.  Access discontinued per protocol.       Discharge Plan:   Discharge instructions reviewed with: Patient.  Patient and/or family verbalized understanding of discharge instructions and all questions answered.  AVS to patient via [employer]Â®.  Patient will return 12/13/23 for next appointment.   Patient discharged in stable condition accompanied by: self.  Departure Mode: Wheelchair.      Tenzin López RN

## 2023-11-16 ENCOUNTER — TELEPHONE (OUTPATIENT)
Dept: NEUROLOGY | Facility: CLINIC | Age: 33
End: 2023-11-16
Payer: MEDICARE

## 2023-11-17 ENCOUNTER — OFFICE VISIT (OUTPATIENT)
Dept: OPHTHALMOLOGY | Facility: CLINIC | Age: 33
End: 2023-11-17
Payer: MEDICARE

## 2023-11-17 ENCOUNTER — TELEPHONE (OUTPATIENT)
Dept: OPHTHALMOLOGY | Facility: CLINIC | Age: 33
End: 2023-11-17

## 2023-11-17 DIAGNOSIS — H52.13 MYOPIA OF BOTH EYES: ICD-10-CM

## 2023-11-17 DIAGNOSIS — E11.9 TYPE 2 DIABETES MELLITUS WITHOUT RETINOPATHY (H): Primary | ICD-10-CM

## 2023-11-17 PROCEDURE — 92015 DETERMINE REFRACTIVE STATE: CPT | Mod: GY | Performed by: OPTOMETRIST

## 2023-11-17 PROCEDURE — 92014 COMPRE OPH EXAM EST PT 1/>: CPT | Performed by: OPTOMETRIST

## 2023-11-17 ASSESSMENT — VISUAL ACUITY
OD_SC+: -2
OS_PH_SC: 20/20
METHOD: SNELLEN - LINEAR
OD_PH_SC: 20/20
OS_SC: 20/20
OD_SC: 20/20

## 2023-11-17 ASSESSMENT — CUP TO DISC RATIO
OS_RATIO: 0.3
OD_RATIO: 0.2

## 2023-11-17 ASSESSMENT — CONF VISUAL FIELD
OD_NORMAL: 1
OS_INFERIOR_NASAL_RESTRICTION: 0
OS_NORMAL: 1
OD_INFERIOR_TEMPORAL_RESTRICTION: 0
OS_SUPERIOR_TEMPORAL_RESTRICTION: 0
OD_INFERIOR_NASAL_RESTRICTION: 0
OS_INFERIOR_TEMPORAL_RESTRICTION: 0
METHOD: COUNTING FINGERS
OS_SUPERIOR_NASAL_RESTRICTION: 0
OD_SUPERIOR_NASAL_RESTRICTION: 0
OD_SUPERIOR_TEMPORAL_RESTRICTION: 0

## 2023-11-17 ASSESSMENT — TONOMETRY
IOP_METHOD: ICARE
OD_IOP_MMHG: 17
OS_IOP_MMHG: 18

## 2023-11-17 ASSESSMENT — SLIT LAMP EXAM - LIDS
COMMENTS: NORMAL
COMMENTS: NORMAL

## 2023-11-17 ASSESSMENT — REFRACTION_MANIFEST
OS_SPHERE: -0.25
OD_SPHERE: -0.25
OS_CYLINDER: SPHERE
OD_CYLINDER: SPHERE

## 2023-11-17 ASSESSMENT — EXTERNAL EXAM - RIGHT EYE: OD_EXAM: NORMAL

## 2023-11-17 ASSESSMENT — EXTERNAL EXAM - LEFT EYE: OS_EXAM: NORMAL

## 2023-11-17 NOTE — PROGRESS NOTES
Assessment/Plan  (E11.9) Type 2 diabetes mellitus without retinopathy (H)  (primary encounter diagnosis)  Plan: Discussed findings with patient. Encouraged continued blood glucose, pressure, and lipid control. Patient should continue following recommendations of primary care provider. Patient should plan on returning to clinic annually for a dilated eye exam but was encouraged to return to clinic sooner with new flashes/floaters or other vision changes.     (H52.13) Myopia of both eyes  Comment: Very minimal Rx  Plan: REFRACTION [[id number]]        Rx updated- optional at this time. Return to clinic with vision changes.     Complete documentation of historical and exam elements from today's encounter can  be found in the full encounter summary report (not reduplicated in this progress  note). I personally obtained the chief complaint(s) and history of present illness. I  confirmed and edited as necessary the review of systems, past medical/surgical  history, family history, social history, and examination findings as documented by  others; and I examined the patient myself. I personally reviewed the relevant tests,  images, and reports as documented above. I formulated and edited as necessary the  assessment and plan and discussed the findings and management plan with the  patient and family.    Horacio Camarena, OD

## 2023-11-17 NOTE — TELEPHONE ENCOUNTER
Left Voicemail (1st Attempt) for the patient to call back and schedule the following:    Appointment type: return  Provider: dr. bowling  Return date: 11/17/2024  Specialty phone number: [phone]  Additonal Notes:  Return in about 1 year (around 11/17/2024) for Comprehensive Exam     Radha loya Procedure   Orthopedics, Podiatry, Sports Medicine, Ent ,Eye , Audiology, Adult Endocrine & Diabetes, Nutrition & Medication Therapy Management Specialties   [facility]

## 2023-11-17 NOTE — NURSING NOTE
Chief Complaints and History of Present Illnesses   Patient presents with    Diabetic Eye Exam Follow Up       Chief Complaint(s) and History of Present Illness(es)       Diabetic Eye Exam Follow Up              Diabetes Type: Type 2              Comments    Patient here for diabetic eye exam.  Patient notes she should be wearing glasses, but lost her last pair and has not been wearing anything. Deals with memory loss issues.   No pain, no flashes, no floaters.     DM2  Lab Results       Component                Value               Date                       A1C                      12.9                10/04/2023                 A1C                      6.5                 04/18/2023                 A1C                      6.0                 01/24/2023                 A1C                      5.4                 07/26/2022                 A1C                      6.1                 02/15/2022                 A1C                      5.0                 03/02/2021                 A1C                      5.7                 12/17/2020                 A1C                      5.2                 03/18/2020                 A1C                      5.3                 09/25/2019                 A1C                      5.3                 01/12/2018                              Alan Robb, Ophthalmic Assistant

## 2023-11-28 ENCOUNTER — TELEPHONE (OUTPATIENT)
Dept: FAMILY MEDICINE | Facility: CLINIC | Age: 33
End: 2023-11-28
Payer: MEDICARE

## 2023-11-28 NOTE — TELEPHONE ENCOUNTER
Sent [employer] message.     Crissy Crump would like to see you in person please call to schedule the following appointment per Crissy note patient has virtual with me on thursday- really needs an in person appointment- same day on a tuesday or wednesday ok.

## 2023-11-28 NOTE — TELEPHONE ENCOUNTER
Called patient per Crissy Madrigal to set up appointment. See note. [phone] virtual with me on thursday- really needs an in person appointment- same day on a tuesday or wednesday ok

## 2023-11-30 ENCOUNTER — TELEPHONE (OUTPATIENT)
Dept: FAMILY MEDICINE | Facility: CLINIC | Age: 33
End: 2023-11-30

## 2023-11-30 ENCOUNTER — TELEPHONE (OUTPATIENT)
Dept: EDUCATION SERVICES | Facility: CLINIC | Age: 33
End: 2023-11-30

## 2023-11-30 ENCOUNTER — VIRTUAL VISIT (OUTPATIENT)
Dept: FAMILY MEDICINE | Facility: CLINIC | Age: 33
End: 2023-11-30
Payer: MEDICARE

## 2023-11-30 DIAGNOSIS — Z87.19 HISTORY OF LIVER DISEASE: ICD-10-CM

## 2023-11-30 DIAGNOSIS — Z92.89 HISTORY OF RENAL DIALYSIS: ICD-10-CM

## 2023-11-30 DIAGNOSIS — Z79.4 TYPE 2 DIABETES MELLITUS WITH CHRONIC KIDNEY DISEASE, WITH LONG-TERM CURRENT USE OF INSULIN, UNSPECIFIED CKD STAGE (H): Primary | ICD-10-CM

## 2023-11-30 DIAGNOSIS — E11.22 TYPE 2 DIABETES MELLITUS WITH CHRONIC KIDNEY DISEASE, WITH LONG-TERM CURRENT USE OF INSULIN, UNSPECIFIED CKD STAGE (H): Primary | ICD-10-CM

## 2023-11-30 DIAGNOSIS — I26.99 PULMONARY EMBOLISM WITH INFARCTION (H): ICD-10-CM

## 2023-11-30 PROCEDURE — 99215 OFFICE O/P EST HI 40 MIN: CPT | Mod: 95 | Performed by: PHYSICIAN ASSISTANT

## 2023-11-30 RX ORDER — INSULIN LISPRO 100 [IU]/ML
INJECTION, SOLUTION INTRAVENOUS; SUBCUTANEOUS
COMMUNITY
Start: 2023-09-28

## 2023-11-30 RX ORDER — LANCETS
EACH MISCELLANEOUS
Qty: 100 EACH | Refills: 6 | Status: SHIPPED | OUTPATIENT
Start: 2023-11-30 | End: 2023-11-30

## 2023-11-30 RX ORDER — LANCETS
EACH MISCELLANEOUS
Qty: 100 EACH | Refills: 6 | Status: SHIPPED | OUTPATIENT
Start: 2023-11-30

## 2023-11-30 RX ORDER — INSULIN ASPART 100 [IU]/ML
0-12 INJECTION, SOLUTION INTRAVENOUS; SUBCUTANEOUS
COMMUNITY
Start: 2023-09-23

## 2023-11-30 NOTE — TELEPHONE ENCOUNTER
"Called patient, relayed provider's message below.  Patient states she will need to check with her aunt first before scheduling follow up appointment with PCP. Writer informed current available in person appointments within next 2 weeks are on 12/5 at 10:30 am and 12/8 at 9:30 am and 3 pm. Informed of referrals placed and provided diabetes referral # [phone] and endocrinology # [phone] and advise to schedule as soon as possible.    Writer asked if patient has follow up with nephrology, patient asked, \"what's that\" writer informed a provider who specialize in kidneys. Patient states she does not see anyone for her kidneys and states she does not have a follow up appointment scheduled.     If patient calls back: please assist with follow up appointment with PCP and lab only appointment a couple days prior.     Routing to provider to review and advise.     TEN Ortega  [facility]        "

## 2023-11-30 NOTE — TELEPHONE ENCOUNTER
Please call and assist with scheduling fasting lab appointment a couple days prior and follow up with me in person in clinic in the next couple weeks at the most- same day on a Tuesday or Wednesday OK   Also needs to follow up with diabetes education as soon as possible- urgent referral placed in the next couple days  Does she have follow up with nephrology scheduled - if so when and where is this scheduled  Needs to see endocrinology as soon as possible- hospitalized for diabetes in September

## 2023-11-30 NOTE — TELEPHONE ENCOUNTER
If patient calls back: please assist with follow up appointment with PCP and lab only appointment a couple days prior.  same day ok   Also recommend follow up with endocrinology and nephrology as soon as able-(kidney specialist)-was on dialysis  Care coordinator and diabetes education should be reaching out as well.

## 2023-11-30 NOTE — PATIENT INSTRUCTIONS
Schedule a face to face visit with me as soon as possible. If possible do labs couple days prior to seeing me.  Care coordinator will be reaching out to you  You will need follow up with endocrinology and nephrology as well as diabetes education urgently  I sent over a prescription for a glucose meter with supplies.

## 2023-11-30 NOTE — PROGRESS NOTES
Hilaria is a 32 year old who is being evaluated via a billable video visit.      How would you like to obtain your AVS? MyChart  If the video visit is dropped, the invitation should be resent by: Send to e-mail at: [email or web address]  Will anyone else be joining your video visit? No          Assessment & Plan     Type 2 diabetes mellitus with chronic kidney disease, with long-term current use of insulin, unspecified CKD stage (H)  Last A1C 12.9 2 months ago- does not have glucometer  Not taking long acting insulin and doesn't know any of her blood sugars  I had attempted to have staff reach out prior to this video visit to schedule face to face visit but always have difficulty getting a hold of her and emergency contact number was not in the chart  Living with aunt   Warm handoff to get care coordination involved in care  Urgent referral to diabetes education    - blood glucose (NO BRAND SPECIFIED) test strip  Dispense: 100 strip; Refill: 6  - blood glucose monitoring (NO BRAND SPECIFIED) meter device kit  Dispense: 1 kit; Refill: 0  - thin (NO BRAND SPECIFIED) lancets  Dispense: 100 each; Refill: 6  - Amb Adult Diabetes Educator Referral  - Adult Endocrinology  Referral  - CBC with platelets and differential  - ESR: Erythrocyte sedimentation rate  - Iron and iron binding capacity  - Ferritin  - Comprehensive metabolic panel (BMP + Alb, Alk Phos, ALT, AST, Total. Bili, TP)  - Albumin Random Urine Quantitative with Creat Ratio  - Primary Care - Care Coordination Referral  - Adult Nephrology  Referral    History of renal dialysis  ??unsure of renal function. States she is no longer on dialysis   - Adult Nephrology  Referral    History of liver disease    - Hepatic panel (Albumin, ALT, AST, Bili, Alk Phos, TP)    Pulmonary embolism with infarction  On eliquis lifelong     Review of the result(s) of each unique test - A1c, basic metabolic panel other labs from 10/4/23.  Ordering of  "each unique test  47 minutes spent by me on the date of the encounter doing chart review, history and exam, documentation and further activities per the note- warm handoff to care coordination       BMI:   Estimated body mass index is 37.74 kg/m  as calculated from the following:    Height as of 10/18/23: 1.702 m (5' 7.01\").    Weight as of 11/15/23: 109.3 kg (241 lb).   Weight management plan: Discussed healthy diet and exercise guidelines    Patient Instructions   Schedule a face to face visit with me as soon as possible. If possible do labs couple days prior to seeing me.  Care coordinator will be reaching out to you  You will need follow up with endocrinology and nephrology as well as diabetes education urgently  I sent over a prescription for a glucose meter with supplies.     Crissy Madrigal PA-C  [facility]   Hilaria is a 32 year old, presenting for the following health issues:  Recheck Medication (All Current Medications/)        11/30/2023    10:41 AM   Additional Questions   Roomed by Bertha RODRÍGUEZ   Accompanied by Self       History of Present Illness       Reason for visit:  Medication Recheck    She eats 2-3 servings of fruits and vegetables daily.She consumes 2 sweetened beverage(s) daily.She exercises with enough effort to increase her heart rate 9 or less minutes per day.  She exercises with enough effort to increase her heart rate 3 or less days per week.   She is taking medications regularly.       PT Here to Recheck all Medications      Diabetes Follow-up    How often are you checking your blood sugar? Not at all in need of a monitor   What concerns do you have today about your diabetes? None   Do you have any of these symptoms? (Select all that apply)  No numbness or tingling in feet.  No redness, sores or blisters on feet.  No complaints of excessive thirst.  No reports of blurry vision.  No significant changes to weight.      BP Readings from Last 2 " Encounters:   11/15/23 118/79   11/06/23 138/89     Hemoglobin A1C (%)   Date Value   10/04/2023 12.9 (H)   04/18/2023 6.5 (H)   03/02/2021 5.0   12/17/2020 5.7 (H)     LDL Cholesterol Calculated (mg/dL)   Date Value   10/04/2023 142 (H)   03/21/2023 129 (H)   12/17/2020 112 (H)   09/30/2020     Cannot estimate LDL when triglyceride exceeds 400 mg/dL     LDL Cholesterol Direct (mg/dL)   Date Value   09/30/2020 88           Patient well known to me with quite extensive medical history presents for med refills.  I was unaware but she was just discharged from TCU last week and Living with aunt.  History of diabetes with DKA, history of massive PE with cardiac arrest, history of polyneuropathy related to covid, wernicke's encephalopathy realted to alcohol.   Discharged from TCU Day before thanksgiving-was at [facility].  Reports that she had Liver and kidney failure - was on dialysis- reports no longer on dialysis and does not have follow up with nephrology.    Sent me home with  a few meds  Venlafaxine 75 mg and 150 mg together  Eliquis 5 mg twice a day   Olanzapine 10 mg at bedtime   Diabetes - didn't send me home with a glucometer - not using glargine insuln because doesn't have reader  Long time - novolog flex pen - 3 units three times a day   Was getting insulin in AM at TCU and if needed any during day nurses gave her more. Has no idea what sugars were in the nursing home    Still getting infusions for neuropathy related to covid   Does not have any follow ups scheduled    Review of Systems   Constitutional, HEENT, cardiovascular, pulmonary, gi and gu systems are negative, except as otherwise noted.      Objective           Vitals:  No vitals were obtained today due to virtual visit.    Physical Exam   GENERAL: Healthy, alert and no distress  EYES: Eyes grossly normal to inspection.  No discharge or erythema, or obvious scleral/conjunctival abnormalities.  RESP: No audible wheeze, cough, or visible  cyanosis.  No visible retractions or increased work of breathing.    SKIN: Visible skin clear. No significant rash, abnormal pigmentation or lesions.  NEURO: Cranial nerves grossly intact.  Mentation and speech appropriate for age.  PSYCH: concentration poor, affect flat, judgement and insight intact, and appearance well groomed                Video-Visit Details    Type of service:  Video Visit   Video Start Time:  1059   Video End Time:11:07 AM    Originating Location (pt. Location): Home    Distant Location (provider location):  Off-site  Platform used for Video Visit: [employer]

## 2023-11-30 NOTE — TELEPHONE ENCOUNTER
Outreach call to patient regarding emergent PCP referral.      Called out to Lita no answer, called Eloise- left voicemail.    Will try again tomorrow.     Jasmin Ku MS, RD, LD, CDE

## 2023-12-01 ENCOUNTER — PATIENT OUTREACH (OUTPATIENT)
Dept: CARE COORDINATION | Facility: CLINIC | Age: 33
End: 2023-12-01
Payer: MEDICARE

## 2023-12-01 ASSESSMENT — ACTIVITIES OF DAILY LIVING (ADL): DEPENDENT_IADLS:: TRANSPORTATION;MEDICATION MANAGEMENT;MONEY MANAGEMENT

## 2023-12-01 NOTE — LETTER
[facility]  Patient Centered Plan of Care  About Me:        Patient Name:  Hilaria Bonner    YOB: 1990  Age:         32 year old   Gabriel MRN:    [id number] Telephone Information:  Home Phone [phone]   Mobile [phone]       Address:  [address]  [place] [facility] [address] Email address:  [email or web address]      Emergency Contact(s)    Name Relationship Lgl Grd Work Phone Home Phone Mobile Phone   1. JAGDEEP BECKER Aunt   [phone]           Primary language:  English     needed? No   [facility] [employer]:  [phone] op. 1  Other communication barriers:Cognitive impairment    Preferred Method of Communication:  Ferdinand  Current living arrangement: I live in a private home with family    Mobility Status/ Medical Equipment: Independent w/Device        Health Maintenance  Health Maintenance Reviewed: Due/Overdue   Health Maintenance Due   Topic Date Due    HF ACTION PLAN  Never done    HEPATITIS B IMMUNIZATION (2 of 3 - 19+ 3-dose series) 08/23/2022    DIABETIC FOOT EXAM  11/16/2022    MEDICARE ANNUAL WELLNESS VISIT  07/26/2023    URINE DRUG SCREEN  07/26/2023    COVID-19 Vaccine (2 - Pfizer risk series) 11/27/2023            My Access Plan  Medical Emergency 911   Primary Clinic Line [facility] - [phone]   24 Hour Appointment Line [phone] or  [address] [place] [address] [phone]) (toll-free)   24 Hour Nurse Line [phone] (toll-free)   Preferred Urgent Care [facility] [phone]     Preferred Hospital [facility]  [phone]     Preferred Pharmacy [id number] - [place], [place] - [address]  [place]     Behavioral Health Crisis Line The National Suicide Prevention Lifeline at 1-384.435.2282 or Text/Call 058           My Care Team Members  Patient Care Team         Relationship Specialty Notifications Start End     Crissy Madrigal PA-C PCP - General Family Practice  10/10/19     Phone: [phone] Fax: [phone] [address] [facility] [address]    Joanne Sterling PA-C Physician Assistant Physician Assistant  1/16/18     Phone: [phone] Fax: [phone]         [address] [facility] [address] [facility] [address]    Alison Sevilla PA-C Physician Assistant Physician Assistant  1/16/18     Phone: [phone] Fax: [phone]         [address] [facility] [address]    Momo Sher MD MD Neurology  7/27/20     Travon Chua MD MD Neurology  7/27/20     Phone: [phone] Fax: [phone]         [address] [facility] [address]    Crissy Madrigal PA-C Assigned PCP   3/14/21     Phone: [phone] Fax: [phone] [address] [facility] [address]    Kishore Salgado MD MD Gastroenterology  11/15/21     Phone: [phone] Fax: [phone]         [address] [facility] [address]    Piper Schuler [facility] Pharmacist Pharmacist  12/20/21     Phone: [phone] [address] [place] [address]    Nita Easton OD  Optometry  5/13/22     Phone: [phone] Fax: [phone]         [address] [facility] [address] [facility] [address]    Crissy Madrigal PA-C Referring Physician Family Medicine  5/13/22     Referring to Eye    Phone: [phone] Fax: [phone] [address] [facility] [address]    Dai Zayas LICSW   - Clinical  7/25/22     Psychotherapy    Phone: [phone] Fax: [phone]         [address] [facility] [address]    Warren Torres MD Assigned Behavioral Health Provider   9/24/22     Phone: [phone] Fax: [phone]         [address] [place] [address]    Priscilla Wilburn MD MD Dermatology  10/3/22     Phone: [phone] Pager: [phone] Fax: [phone]         DERMATOLOGY [address] [place] [address]  [id number] [facility] [address]    Travon Chua MD Assigned Neuroscience Provider   12/17/22     Phone: [phone] Fax: [phone]         [address] [facility] [address]    Yash Magana MD MD Neurology  3/29/23     Phone: [phone] Fax: [phone]         [address] [place] [address] [id number] [facility] [address]    Pop Laboy, PhD LP MD Neuropsychology  10/6/23     Phone: [phone] Fax: [phone]         [address] [place] [address]    Alexander Griffiths DO Physician Neurology  10/6/23     Phone: [phone] Fax: [phone]         [address] [place] [address]    Nancy Jeronimo APRN CNP Nurse Practitioner OB/Gyn  10/13/23     Phone: [phone] Fax: [phone]         [address] [place] [address] [facility] [address]    Horacio Camarena OD MD Optometry  10/13/23     Phone: [phone] Fax: [phone]         [address] [facility] [address]    Nancy Jeronimo APRN CNP Assigned OBGYN Provider   10/28/23     Phone: [phone] Fax: [phone]         [address] [place] [address] [facility] [address]    Horacio Camarena OD Assigned Surgical Provider   11/25/23     Phone: [phone] Fax: [phone]          [facility] [address]    Erum Soto, RN Lead Care Coordinator Primary Care - CC Admissions 12/1/23     Phone: [phone] Fax: [phone]        Lea Perez CHW Community Health Worker Primary Care - [facility] Admissions 12/1/23     Phone: [phone] Fax: [phone]                    My Care Plans  Self Management and Treatment Plan    Care Plan  Care Plan: Diabetes (Primary Care)       Problem: Diabetes Mangement Needs Improvement       Goal: Demonstrate improved diabetes management       Start Date: 12/1/2023 Expected End Date: 3/1/2024    Note:     Barriers: recent hospitalization.   Strengths: enrolled in care coordination   Patient expressed understanding of goal: yes   Action steps to achieve this  goal:  1. I will attend my primary care provider follow up visits as recommended.   2. I will schedule and attend my Adult Diabetes Educator appointment. Scheduling line: [phone]  3. I will schedule and attend my Endocrinology appointment. Scheduling line: [phone]  4. I will schedule and attend my Nephrology appointment. Scheduling line: [phone].  5. I will have my aunt  my glucose monitoring kit, and monitor my blood glucose daily.   6. I will take my medications daily as advised.                               Care Plan: CHW Goal       Problem: Ensure follow up       Goal: I will have my DM supplies and follow up with my provider       Note:     Barriers: none   Strengths: enrolled in CC   Patient expressed understanding of goal: yes   Action steps to achieve this goal:  1. I will ask my Aunt to  my diabetic testing supplies that were sent to the [facility] pharmacy 11/30   2. I will attend my [facility] 12/5 lab only and primary care provider follow up 12/8 at [place].                                 Action Plans on File:                       Advance Care Plans/Directives:   Advanced Care Plan/Directives on file:   No    Discussed with patient/caregiver(s):   Referral to Honoring Choices             My Medical and Care Information  Problem List   Patient Active Problem List   Diagnosis    Vitamin D deficiency    Encounter for smoking cessation counseling    Menorrhagia with irregular cycle    Pulmonary embolism with infarction (H)    Secondary hyperparathyroidism, not elsewhere classified (H24)    Paresthesias    Hemorrhoids, unspecified hemorrhoid type    Chronic inflammatory demyelinating polyneuropathy (H)    S/P laparoscopic sleeve gastrectomy    Morbid obesity (H)    Moderate major depression (H)    Wernicke-Korsakoff syndrome (alcoholic) (H)    Cognitive and neurobehavioral dysfunction following brain injury (H24)    Psychosis, unspecified psychosis type (H)    MELODY (generalized  anxiety disorder)    Medical cannabis use    Tobacco use    Compression fracture of T9 vertebra with routine healing, subsequent encounter    Benign essential hypertension    History of pulmonary embolism    KENDALL (obstructive sleep apnea)- mild (AHI 6)    Type 2 diabetes mellitus with chronic kidney disease, with long-term current use of insulin, unspecified CKD stage (H)    Vitamin B12 deficiency (non anemic)    Chronic anticoagulation    Elevated LFTs    Acute kidney injury (H24)    Elevated lactic acid level    Gastrointestinal hemorrhage, unspecified gastrointestinal hemorrhage type    Hypotension due to hypovolemia    Cervical high risk HPV (human papillomavirus) test positive    Urinary incontinence    Decubitus ulcer of left hip    Pancreatic insufficiency          Care Coordination Start Date: 11/30/2023   Frequency of Care Coordination: monthly, more frequently as needed     Form Last Updated: 12/01/2023

## 2023-12-01 NOTE — LETTER
[facility] CARE COORDINATION  December 1, 2023    Hilaria Bonner  [address]   [place] [address]      Dear Hilaria,    I am a clinic care coordinator who works with Crissy Madrigal PA-C with the [facility]. I wanted to thank you for spending the time to talk with me.  Below is a description of clinic care coordination and how I can further assist you.       The clinic care coordination team is made up of a registered nurse, , financial resource worker and community health worker who understand the health care system. The goal of clinic care coordination is to help you manage your health and improve access to the health care system. Our team works alongside your provider to assist you in determining your health and social needs. We can help you obtain health care and community resources, providing you with necessary information and education. We can work with you through any barriers and develop a care plan that helps coordinate and strengthen the communication between you and your care team.  Our services are voluntary and are offered without charge to you personally.    Please feel free to contact me with any questions or concerns regarding care coordination and what we can offer.      We are focused on providing you with the highest-quality healthcare experience possible.    Sincerely,     Erum Soto RN, BSN, PHN Care Coordinator  [place], [place], and [facility]pez   Phone: [phone]

## 2023-12-01 NOTE — TELEPHONE ENCOUNTER
Rcvd a call from pt and RN care coordinator to help assist pt with scheduling OV and lab. Pt requested latest appt available. Pt was sched with PCP on 12/8 and [facility] lab on 12/5 per pt req. RN CC was going to stay on the line to assist pt further so TC ended call.  Yola Ríos CMA

## 2023-12-01 NOTE — Clinical Note
Riky Tate,  Can you please call patient in 2-3 weeks and ensure she picked up her diabetic testing supplies that were sent to the [facility] pharmacy 11/30? Also, that patient attended her 12/5 lab only and PCP follow up 12/8? Thank you, Erum Soto RN, BSN, PHN Care Coordinator [place], [place], and [facility] L[facility]pez  Phone: [phone]

## 2023-12-01 NOTE — PROGRESS NOTES
Clinic Care Coordination Contact  Clinic Care Coordination Contact  OUTREACH    Referral Information:  Referral Source: Care Team    Primary Diagnosis: Diabetes    Chief Complaint   Patient presents with    Clinic Care Coordination - Initial      Universal Utilization: Clinic Utilization  Difficulty keeping appointments:: Yes  Compliance Concerns: Yes  No-Show Concerns: No  No PCP office visit in Past Year: No  Utilization      No Show Count (past year)  13             ED Visits  0             Hospital Admissions  0                    Current as of: 11/30/2023 10:57 PM              Clinical Concerns:  Current Medical Concerns:  Care Team Referral:     recently on dialysis- discharged from TCU last week- nephrology fu?? endocrinology fu?- not checking blood sugars-no meter     CC RN contacted patient, introduced self, role, care coordination program and reason for call. We discussed yesterdays virtual visit patient instructions:   Schedule a face to face visit with me as soon as possible. If possible do labs couple days prior to seeing me. With the assistance of [place] , patient has a lab only visit at the [facility] (close to her Aunts home) on 12/5, and patient has in person PCP follow up on 12/8. Aunt to drive.   Care coordinator will be reaching out to you -completed, and patient enrolled in CC.   You will need follow up with endocrinology and nephrology as well as diabetes education urgently Writer informed patient these referrals a the  type and patient needs to answer her phone calls. CC RN let patient know that DM ed called yesterday and that there is an open message to nephrology about a 1-2 week follow up visit, again patient must answer her phone when they call. Patient was provided all 3 scheduling line for DM ed, nephrology and endocrinology to ensure all 3 appointments are scheduled. CC RN stressed the absolute importance of attending her follow up visits as recommended.  Patient agreeable.   I sent over a prescription for a glucose meter with supplies.  Patient has not picked up her supplies. Again writer stressed the importance of picking up the supplies asa. Patient stated she will call her Aunt at work and ask her to stop at their [facility] pharmacy in [place] and  supplies tonight on her way home.     CC RN will place a consent to communicate form in the postal mail, patient/aunt to bring back to any [facility] front dest to be scanned into her chart.   Current Behavioral Concerns: none noted on call today, patient does have neuropsych testing pending.     Education Provided to patient: as above.    Pain  Pain (GOAL):: No  Health Maintenance Reviewed: Due/Overdue   Health Maintenance Due   Topic Date Due    HF ACTION PLAN  Never done    HEPATITIS B IMMUNIZATION (2 of 3 - 19+ 3-dose series) 08/23/2022    DIABETIC FOOT EXAM  11/16/2022    MEDICARE ANNUAL WELLNESS VISIT  07/26/2023    URINE DRUG SCREEN  07/26/2023    COVID-19 Vaccine (2 - Pfizer risk series) 11/27/2023      Clinical Pathway: None    Medication Management:  Medication review status: patient stated she and her Aunt set up meds once per week in a weekly pill pack.     Functional Status:  Dependent ADLs:: Ambulation-walker  Dependent IADLs:: Transportation, Medication Management, Money Management  Bed or wheelchair confined:: No  Mobility Status: Independent w/Device  Fallen 2 or more times in the past year?: No  Any fall with injury in the past year?: No    Living Situation:  Current living arrangement:: I live in a private home with family  Type of residence:: Private home - stairs    Lifestyle & Psychosocial Needs:    Social Determinants of Health     Food Insecurity: Low Risk  (12/1/2023)    Food Insecurity     Within the past 12 months, did you worry that your food would run out before you got money to buy more?: No     Within the past 12 months, did the food you bought just not last  and you didn t have money to get more?: No   Depression: Not at risk (11/6/2023)    PHQ-2     PHQ-2 Score: 0   Housing Stability: Low Risk  (11/30/2023)    Housing Stability     Do you have housing? : Yes     Are you worried about losing your housing?: No   Tobacco Use: Medium Risk (11/30/2023)    Patient History     Smoking Tobacco Use: Former     Smokeless Tobacco Use: Never     Passive Exposure: Not on file   Financial Resource Strain: Low Risk  (12/1/2023)    Financial Resource Strain     Within the past 12 months, have you or your family members you live with been unable to get utilities (heat, electricity) when it was really needed?: No   Alcohol Use: Not on file   Transportation Needs: Low Risk  (12/1/2023)    Transportation Needs     Within the past 12 months, has lack of transportation kept you from medical appointments, getting your medicines, non-medical meetings or appointments, work, or from getting things that you need?: No   Physical Activity: Not on file   Interpersonal Safety: Not on file   Stress: Not on file   Social Connections: Not on file     Diet:: Regular  Inadequate nutrition (GOAL):: No  Tube Feeding: No  Inadequate activity/exercise (GOAL):: No  Significant changes in sleep pattern (GOAL): No  Transportation means:: Family, Friend, Medical transport     Nondenominational or spiritual beliefs that impact treatment:: No  Mental health DX:: Yes  Mental health DX how managed:: Medication  Mental health management concern (GOAL):: No  Chemical Dependency Status: No Current Concerns  Informal Support system:: Children, Family, Friends, Neighbors      Resources and Interventions:  Current Resources:   Community Resources: None  Supplies Currently Used at Home: Diabetic Supplies  Equipment Currently Used at Home: walker, standard  Employment Status: disabled     Advance Care Plan/Directive  Advanced Care Plans/Directives on file:: No  Discussed with patient/caregiver:: Referral to Honoring  Choices    Referrals Placed: None     Care Plan:  Care Plan: Diabetes (Primary Care)       Problem: Diabetes Mangement Needs Improvement       Goal: Demonstrate improved diabetes management       Start Date: 12/1/2023 Expected End Date: 3/1/2024    Note:     Barriers: recent hospitalization.   Strengths: enrolled in care coordination   Patient expressed understanding of goal: yes   Action steps to achieve this goal:  1. I will attend my primary care provider follow up visits as recommended.   2. I will schedule and attend my Adult Diabetes Educator appointment. Scheduling line: [phone]  3. I will schedule and attend my Endocrinology appointment. Scheduling line: [phone]  4. I will schedule and attend my Nephrology appointment. Scheduling line: [phone].  5. I will have my aunt  my glucose monitoring kit, and monitor my blood glucose daily.   6. I will take my medications daily as advised.                               Care Plan: General       Problem: Ensure follow up       Goal: I will have my DM supplies and follow up with my provider       Note:     Barriers: none   Strengths: enrolled in CC   Patient expressed understanding of goal: yes   Action steps to achieve this goal:  1. I will ask my Aunt to  my diabetic testing supplies that were sent to the [facility] pharmacy 11/30   2. I will attend my [place] 12/5 lab only and primary care provider follow up 12/8 at [place].                                 Patient/Caregiver understanding: yes     Outreach Frequency: monthly, more frequently as needed  Future Appointments                In 4 days [facility]    In 1 week Crissy Madrigal PA-C [facility] CL    In 1 week  INFUSION CHAIR;  CANCER INFUSION NURSE [facility] [place], [facility]    In 1 month  INFUSION CHAIR;  CANCER INFUSION NURSE [facility] Cancer  [facility]    In 2 months Pop Laboy, PhD [facility], [facility]    In 6 months Travon Chua MD [facility] Neurology Clinic [facility]            Plan: 1. Patient verbalized good understanding of yesterdays PCP's care plans and will follow recommendations.  2. Patient enrolled in Care Coordination, goal(s) identified at this time.   3. Patient centered care plan, and introduction letter sent to patient.  4. CHW will reach out to patient in 2-3 weeks and CC RN will review chart 2-3 weeks after then.     Erum Stoo RN, BSN, PHN Care Coordinator  [place], [place], and [place]   Phone: [phone]

## 2023-12-01 NOTE — TELEPHONE ENCOUNTER
Called out to both Lita and Eloise.  Left voicemail with appointment information.  Provided in person options for early next week OR virtual anyday and time.     Triage can you try outreach again on Monday?    Jasmin Ku MS, RD, LD, CDE

## 2023-12-04 NOTE — TELEPHONE ENCOUNTER
Attempted phone call to [facility]. No answer and VM not set up. This is our 3rd attempt trying to reach her. Will send pt a Mychart message and notify PCP that we have been unable to reach her.     Marifer Yeager RD, LD, [facility]

## 2023-12-05 ENCOUNTER — MYC MEDICAL ADVICE (OUTPATIENT)
Dept: NEPHROLOGY | Facility: CLINIC | Age: 33
End: 2023-12-05

## 2023-12-05 NOTE — TELEPHONE ENCOUNTER
Sent patient a My Chart message to inquire if she is following up with a kidney provider from the hospital or if she is looking to establish care // 12.05.2023 MCE

## 2023-12-07 ENCOUNTER — TELEPHONE (OUTPATIENT)
Dept: NEPHROLOGY | Facility: CLINIC | Age: 33
End: 2023-12-07
Payer: MEDICARE

## 2023-12-07 NOTE — TELEPHONE ENCOUNTER
LVM to follow up on My Chart message to see if patient wants to continue with [facility] or with finding a new nephrologist // second attempt, max attempts reached 12.07.2023 MCE

## 2023-12-21 ENCOUNTER — HOSPITAL ENCOUNTER (INPATIENT)
Age: 33
LOS: 3 days | Discharge: HOME | DRG: 420 | End: 2023-12-24
Attending: EMERGENCY MEDICINE | Admitting: INTERNAL MEDICINE
Payer: MEDICAID

## 2023-12-21 ENCOUNTER — APPOINTMENT (OUTPATIENT)
Dept: ULTRASOUND IMAGING | Age: 33
DRG: 420 | End: 2023-12-21
Payer: MEDICAID

## 2023-12-21 ENCOUNTER — APPOINTMENT (OUTPATIENT)
Dept: GENERAL RADIOLOGY | Age: 33
DRG: 420 | End: 2023-12-21
Payer: MEDICAID

## 2023-12-21 ENCOUNTER — APPOINTMENT (OUTPATIENT)
Age: 33
DRG: 420 | End: 2023-12-21
Payer: MEDICAID

## 2023-12-21 DIAGNOSIS — R10.13 ABDOMINAL PAIN, EPIGASTRIC: Primary | ICD-10-CM

## 2023-12-21 PROBLEM — E87.20 LACTIC ACIDOSIS: Status: ACTIVE | Noted: 2023-12-21

## 2023-12-21 PROBLEM — E87.29 HIGH ANION GAP METABOLIC ACIDOSIS: Status: ACTIVE | Noted: 2023-12-21

## 2023-12-21 PROBLEM — I10 BENIGN ESSENTIAL HYPERTENSION: Status: ACTIVE | Noted: 2021-08-03

## 2023-12-21 PROBLEM — N17.9 AKI (ACUTE KIDNEY INJURY) (HCC): Status: ACTIVE | Noted: 2023-12-21

## 2023-12-21 PROBLEM — A41.9 SEPSIS (HCC): Status: ACTIVE | Noted: 2023-12-21

## 2023-12-21 PROBLEM — N39.0 UTI (URINARY TRACT INFECTION): Status: ACTIVE | Noted: 2023-12-21

## 2023-12-21 PROBLEM — E11.10 DKA, TYPE 2, NOT AT GOAL (HCC): Status: ACTIVE | Noted: 2023-12-21

## 2023-12-21 PROBLEM — R79.89 PSEUDOHYPONATREMIA: Status: ACTIVE | Noted: 2023-12-21

## 2023-12-21 PROBLEM — D72.825 BANDEMIA: Status: ACTIVE | Noted: 2023-12-21

## 2023-12-21 LAB
ALBUMIN SERPL-MCNC: 4.3 G/DL (ref 3.5–5.2)
ALBUMIN/GLOB SERPL: 1 {RATIO} (ref 1–2.5)
ALP SERPL-CCNC: 193 U/L (ref 35–104)
ALT SERPL-CCNC: 9 U/L (ref 5–33)
AMPHET UR QL SCN: NEGATIVE
ANION GAP SERPL CALCULATED.3IONS-SCNC: 24 MMOL/L (ref 9–17)
ANION GAP SERPL CALCULATED.3IONS-SCNC: 32 MMOL/L (ref 9–17)
AST SERPL-CCNC: 15 U/L
B-OH-BUTYR SERPL-MCNC: 8.47 MMOL/L (ref 0.02–0.27)
BACTERIA URNS QL MICRO: ABNORMAL
BARBITURATES UR QL SCN: NEGATIVE
BASOPHILS # BLD: 0.04 K/UL (ref 0–0.2)
BASOPHILS NFR BLD: 0 % (ref 0–2)
BENZODIAZ UR QL: NEGATIVE
BILIRUB DIRECT SERPL-MCNC: 0.1 MG/DL
BILIRUB INDIRECT SERPL-MCNC: 0.1 MG/DL (ref 0–1)
BILIRUB SERPL-MCNC: 0.2 MG/DL (ref 0.3–1.2)
BILIRUB UR QL STRIP: NEGATIVE
BODY TEMPERATURE: 37
BUN SERPL-MCNC: 10 MG/DL (ref 6–20)
BUN SERPL-MCNC: 13 MG/DL (ref 6–20)
CALCIUM SERPL-MCNC: 10 MG/DL (ref 8.6–10.4)
CALCIUM SERPL-MCNC: 8.8 MG/DL (ref 8.6–10.4)
CANNABINOIDS UR QL SCN: NEGATIVE
CASTS #/AREA URNS LPF: ABNORMAL /LPF (ref 0–8)
CHLORIDE SERPL-SCNC: 102 MMOL/L (ref 98–107)
CHLORIDE SERPL-SCNC: 89 MMOL/L (ref 98–107)
CHP ED QC CHECK: YES
CLARITY UR: ABNORMAL
CO2 SERPL-SCNC: 7 MMOL/L (ref 20–31)
CO2 SERPL-SCNC: 8 MMOL/L (ref 20–31)
COCAINE UR QL SCN: NEGATIVE
COHGB MFR BLD: 1.7 % (ref 0–5)
COLOR UR: YELLOW
CREAT SERPL-MCNC: 1.1 MG/DL (ref 0.5–0.9)
CREAT SERPL-MCNC: 1.4 MG/DL (ref 0.5–0.9)
EOSINOPHIL # BLD: <0.03 K/UL (ref 0–0.44)
EOSINOPHILS RELATIVE PERCENT: 0 % (ref 1–4)
EPI CELLS #/AREA URNS HPF: ABNORMAL /HPF (ref 0–5)
ERYTHROCYTE [DISTWIDTH] IN BLOOD BY AUTOMATED COUNT: 12.6 % (ref 11.8–14.4)
EST. AVERAGE GLUCOSE BLD GHB EST-MCNC: 223 MG/DL
FENTANYL UR QL: NEGATIVE
FIO2 ON VENT: ABNORMAL %
GFR SERPL CREATININE-BSD FRML MDRD: 51 ML/MIN/1.73M2
GFR SERPL CREATININE-BSD FRML MDRD: >60 ML/MIN/1.73M2
GLUCOSE BLD-MCNC: 151 MG/DL (ref 65–105)
GLUCOSE BLD-MCNC: 203 MG/DL (ref 65–105)
GLUCOSE BLD-MCNC: 224 MG/DL (ref 65–105)
GLUCOSE BLD-MCNC: 226 MG/DL (ref 65–105)
GLUCOSE BLD-MCNC: 270 MG/DL (ref 65–105)
GLUCOSE BLD-MCNC: 297 MG/DL (ref 65–105)
GLUCOSE BLD-MCNC: 300 MG/DL (ref 65–105)
GLUCOSE BLD-MCNC: 372 MG/DL (ref 65–105)
GLUCOSE BLD-MCNC: 374 MG/DL
GLUCOSE BLD-MCNC: 464 MG/DL
GLUCOSE BLD-MCNC: 464 MG/DL (ref 65–105)
GLUCOSE BLD-MCNC: 534 MG/DL
GLUCOSE BLD-MCNC: 534 MG/DL (ref 65–105)
GLUCOSE SERPL-MCNC: 266 MG/DL (ref 70–99)
GLUCOSE SERPL-MCNC: 563 MG/DL (ref 70–99)
GLUCOSE UR STRIP-MCNC: ABNORMAL MG/DL
HBA1C MFR BLD: 9.4 % (ref 4–6)
HCG UR QL: NEGATIVE
HCO3 VENOUS: 7.2 MMOL/L (ref 24–30)
HCT VFR BLD AUTO: 55 % (ref 36.3–47.1)
HGB BLD-MCNC: 18.1 G/DL (ref 11.9–15.1)
HGB UR QL STRIP.AUTO: ABNORMAL
IMM GRANULOCYTES # BLD AUTO: 0.08 K/UL (ref 0–0.3)
IMM GRANULOCYTES NFR BLD: 1 %
INR PPP: 1.1
KETONES UR STRIP-MCNC: ABNORMAL MG/DL
LACTIC ACID, WHOLE BLOOD: 4.1 MMOL/L (ref 0.7–2.1)
LACTIC ACID, WHOLE BLOOD: 4.8 MMOL/L (ref 0.7–2.1)
LEUKOCYTE ESTERASE UR QL STRIP: ABNORMAL
LIPASE SERPL-CCNC: 38 U/L (ref 13–60)
LYMPHOCYTES NFR BLD: 1.24 K/UL (ref 1.1–3.7)
LYMPHOCYTES RELATIVE PERCENT: 8 % (ref 24–43)
MAGNESIUM SERPL-MCNC: 2 MG/DL (ref 1.6–2.6)
MCH RBC QN AUTO: 30.5 PG (ref 25.2–33.5)
MCHC RBC AUTO-ENTMCNC: 32.9 G/DL (ref 28.4–34.8)
MCV RBC AUTO: 92.7 FL (ref 82.6–102.9)
METHADONE UR QL: NEGATIVE
MONOCYTES NFR BLD: 0.41 K/UL (ref 0.1–1.2)
MONOCYTES NFR BLD: 3 % (ref 3–12)
NEGATIVE BASE EXCESS, VEN: 22.1 MMOL/L (ref 0–2)
NEUTROPHILS NFR BLD: 88 % (ref 36–65)
NEUTS SEG NFR BLD: 13.24 K/UL (ref 1.5–8.1)
NITRITE UR QL STRIP: NEGATIVE
NRBC BLD-RTO: 0 PER 100 WBC
O2 SAT, VEN: 83.5 % (ref 60–85)
OPIATES UR QL SCN: NEGATIVE
OXYCODONE UR QL SCN: NEGATIVE
PCO2, VEN: 23.4 MM HG (ref 39–55)
PCP UR QL SCN: NEGATIVE
PH UR STRIP: 5 [PH] (ref 5–8)
PH VENOUS: 7.12 (ref 7.32–7.42)
PHOSPHATE SERPL-MCNC: 2 MG/DL (ref 2.6–4.5)
PLATELET # BLD AUTO: 320 K/UL (ref 138–453)
PMV BLD AUTO: 12.2 FL (ref 8.1–13.5)
PO2, VEN: 58 MM HG (ref 30–50)
POTASSIUM SERPL-SCNC: 3.6 MMOL/L (ref 3.7–5.3)
POTASSIUM SERPL-SCNC: 4.4 MMOL/L (ref 3.7–5.3)
PROCALCITONIN SERPL-MCNC: 0.05 NG/ML (ref 0–0.09)
PROT SERPL-MCNC: 8.8 G/DL (ref 6.4–8.3)
PROT UR STRIP-MCNC: ABNORMAL MG/DL
PROTHROMBIN TIME: 14.2 SEC (ref 11.7–14.9)
RBC # BLD AUTO: 5.93 M/UL (ref 3.95–5.11)
RBC #/AREA URNS HPF: ABNORMAL /HPF (ref 0–4)
SODIUM SERPL-SCNC: 128 MMOL/L (ref 135–144)
SODIUM SERPL-SCNC: 134 MMOL/L (ref 135–144)
SP GR UR STRIP: 1.03 (ref 1–1.03)
TEST INFORMATION: NORMAL
TROPONIN I SERPL HS-MCNC: <6 NG/L (ref 0–14)
TSH SERPL DL<=0.05 MIU/L-ACNC: 0.85 UIU/ML (ref 0.3–5)
UROBILINOGEN UR STRIP-ACNC: NORMAL EU/DL (ref 0–1)
WBC #/AREA URNS HPF: ABNORMAL /HPF (ref 0–5)
WBC OTHER # BLD: 15 K/UL (ref 3.5–11.3)

## 2023-12-21 PROCEDURE — 71045 X-RAY EXAM CHEST 1 VIEW: CPT

## 2023-12-21 PROCEDURE — 82805 BLOOD GASES W/O2 SATURATION: CPT

## 2023-12-21 PROCEDURE — 80179 DRUG ASSAY SALICYLATE: CPT

## 2023-12-21 PROCEDURE — 80076 HEPATIC FUNCTION PANEL: CPT

## 2023-12-21 PROCEDURE — 2580000003 HC RX 258

## 2023-12-21 PROCEDURE — 84145 PROCALCITONIN (PCT): CPT

## 2023-12-21 PROCEDURE — 82947 ASSAY GLUCOSE BLOOD QUANT: CPT

## 2023-12-21 PROCEDURE — 99223 1ST HOSP IP/OBS HIGH 75: CPT | Performed by: INTERNAL MEDICINE

## 2023-12-21 PROCEDURE — 2500000003 HC RX 250 WO HCPCS

## 2023-12-21 PROCEDURE — 84100 ASSAY OF PHOSPHORUS: CPT

## 2023-12-21 PROCEDURE — 83735 ASSAY OF MAGNESIUM: CPT

## 2023-12-21 PROCEDURE — 74176 CT ABD & PELVIS W/O CONTRAST: CPT

## 2023-12-21 PROCEDURE — 87040 BLOOD CULTURE FOR BACTERIA: CPT

## 2023-12-21 PROCEDURE — 83036 HEMOGLOBIN GLYCOSYLATED A1C: CPT

## 2023-12-21 PROCEDURE — 84443 ASSAY THYROID STIM HORMONE: CPT

## 2023-12-21 PROCEDURE — 80048 BASIC METABOLIC PNL TOTAL CA: CPT

## 2023-12-21 PROCEDURE — C9113 INJ PANTOPRAZOLE SODIUM, VIA: HCPCS

## 2023-12-21 PROCEDURE — 85610 PROTHROMBIN TIME: CPT

## 2023-12-21 PROCEDURE — 81001 URINALYSIS AUTO W/SCOPE: CPT

## 2023-12-21 PROCEDURE — 6370000000 HC RX 637 (ALT 250 FOR IP)

## 2023-12-21 PROCEDURE — 84484 ASSAY OF TROPONIN QUANT: CPT

## 2023-12-21 PROCEDURE — 81025 URINE PREGNANCY TEST: CPT

## 2023-12-21 PROCEDURE — 96375 TX/PRO/DX INJ NEW DRUG ADDON: CPT

## 2023-12-21 PROCEDURE — 36415 COLL VENOUS BLD VENIPUNCTURE: CPT

## 2023-12-21 PROCEDURE — 80143 DRUG ASSAY ACETAMINOPHEN: CPT

## 2023-12-21 PROCEDURE — 2060000000 HC ICU INTERMEDIATE R&B

## 2023-12-21 PROCEDURE — 87086 URINE CULTURE/COLONY COUNT: CPT

## 2023-12-21 PROCEDURE — 6360000002 HC RX W HCPCS

## 2023-12-21 PROCEDURE — 93005 ELECTROCARDIOGRAM TRACING: CPT

## 2023-12-21 PROCEDURE — 99285 EMERGENCY DEPT VISIT HI MDM: CPT

## 2023-12-21 PROCEDURE — 83690 ASSAY OF LIPASE: CPT

## 2023-12-21 PROCEDURE — 82010 KETONE BODYS QUAN: CPT

## 2023-12-21 PROCEDURE — A4216 STERILE WATER/SALINE, 10 ML: HCPCS

## 2023-12-21 PROCEDURE — 80307 DRUG TEST PRSMV CHEM ANLYZR: CPT

## 2023-12-21 PROCEDURE — 85025 COMPLETE CBC W/AUTO DIFF WBC: CPT

## 2023-12-21 PROCEDURE — 96374 THER/PROPH/DIAG INJ IV PUSH: CPT

## 2023-12-21 PROCEDURE — 76770 US EXAM ABDO BACK WALL COMP: CPT

## 2023-12-21 PROCEDURE — 83605 ASSAY OF LACTIC ACID: CPT

## 2023-12-21 RX ORDER — 0.9 % SODIUM CHLORIDE 0.9 %
15 INTRAVENOUS SOLUTION INTRAVENOUS ONCE
Status: COMPLETED | OUTPATIENT
Start: 2023-12-21 | End: 2023-12-21

## 2023-12-21 RX ORDER — SODIUM CHLORIDE 0.9 % (FLUSH) 0.9 %
5-40 SYRINGE (ML) INJECTION PRN
Status: DISCONTINUED | OUTPATIENT
Start: 2023-12-21 | End: 2023-12-24 | Stop reason: HOSPADM

## 2023-12-21 RX ORDER — DEXTROSE AND SODIUM CHLORIDE 5; .45 G/100ML; G/100ML
INJECTION, SOLUTION INTRAVENOUS CONTINUOUS PRN
Status: DISCONTINUED | OUTPATIENT
Start: 2023-12-21 | End: 2023-12-24 | Stop reason: HOSPADM

## 2023-12-21 RX ORDER — MORPHINE SULFATE 2 MG/ML
2 INJECTION, SOLUTION INTRAMUSCULAR; INTRAVENOUS EVERY 4 HOURS PRN
Status: DISCONTINUED | OUTPATIENT
Start: 2023-12-21 | End: 2023-12-24 | Stop reason: HOSPADM

## 2023-12-21 RX ORDER — POTASSIUM CHLORIDE 20 MEQ/1
40 TABLET, EXTENDED RELEASE ORAL PRN
Status: DISCONTINUED | OUTPATIENT
Start: 2023-12-21 | End: 2023-12-24 | Stop reason: HOSPADM

## 2023-12-21 RX ORDER — MAGNESIUM SULFATE IN WATER 40 MG/ML
2000 INJECTION, SOLUTION INTRAVENOUS PRN
Status: DISCONTINUED | OUTPATIENT
Start: 2023-12-21 | End: 2023-12-24 | Stop reason: HOSPADM

## 2023-12-21 RX ORDER — SODIUM CHLORIDE 0.9 % (FLUSH) 0.9 %
5-40 SYRINGE (ML) INJECTION EVERY 12 HOURS SCHEDULED
Status: DISCONTINUED | OUTPATIENT
Start: 2023-12-21 | End: 2023-12-24 | Stop reason: HOSPADM

## 2023-12-21 RX ORDER — ACETAMINOPHEN 325 MG/1
650 TABLET ORAL EVERY 6 HOURS PRN
Status: DISCONTINUED | OUTPATIENT
Start: 2023-12-21 | End: 2023-12-24 | Stop reason: HOSPADM

## 2023-12-21 RX ORDER — MULTIVITAMIN WITH IRON
1 TABLET ORAL DAILY
Status: DISCONTINUED | OUTPATIENT
Start: 2023-12-21 | End: 2023-12-24 | Stop reason: HOSPADM

## 2023-12-21 RX ORDER — POTASSIUM CHLORIDE 7.45 MG/ML
10 INJECTION INTRAVENOUS PRN
Status: DISCONTINUED | OUTPATIENT
Start: 2023-12-21 | End: 2023-12-24 | Stop reason: HOSPADM

## 2023-12-21 RX ORDER — ACETAMINOPHEN 650 MG/1
650 SUPPOSITORY RECTAL EVERY 6 HOURS PRN
Status: DISCONTINUED | OUTPATIENT
Start: 2023-12-21 | End: 2023-12-24 | Stop reason: HOSPADM

## 2023-12-21 RX ORDER — OLANZAPINE 10 MG/1
10 TABLET ORAL NIGHTLY
COMMUNITY

## 2023-12-21 RX ORDER — LANOLIN ALCOHOL/MO/W.PET/CERES
100 CREAM (GRAM) TOPICAL DAILY
Status: DISCONTINUED | OUTPATIENT
Start: 2023-12-21 | End: 2023-12-24 | Stop reason: HOSPADM

## 2023-12-21 RX ORDER — VENLAFAXINE HYDROCHLORIDE 150 MG/1
150 CAPSULE, EXTENDED RELEASE ORAL DAILY
COMMUNITY

## 2023-12-21 RX ORDER — OXYCODONE HYDROCHLORIDE 5 MG/1
5 TABLET ORAL ONCE
Status: COMPLETED | OUTPATIENT
Start: 2023-12-21 | End: 2023-12-21

## 2023-12-21 RX ORDER — INSULIN GLARGINE 100 [IU]/ML
INJECTION, SOLUTION SUBCUTANEOUS
Status: ON HOLD | COMMUNITY
End: 2023-12-24

## 2023-12-21 RX ORDER — 0.9 % SODIUM CHLORIDE 0.9 %
1000 INTRAVENOUS SOLUTION INTRAVENOUS ONCE
Status: COMPLETED | OUTPATIENT
Start: 2023-12-21 | End: 2023-12-21

## 2023-12-21 RX ORDER — METOCLOPRAMIDE HYDROCHLORIDE 5 MG/ML
10 INJECTION INTRAMUSCULAR; INTRAVENOUS ONCE
Status: COMPLETED | OUTPATIENT
Start: 2023-12-21 | End: 2023-12-21

## 2023-12-21 RX ORDER — VENLAFAXINE HYDROCHLORIDE 75 MG/1
75 CAPSULE, EXTENDED RELEASE ORAL DAILY
COMMUNITY

## 2023-12-21 RX ORDER — INSULIN ASPART 100 [IU]/ML
INJECTION, SOLUTION INTRAVENOUS; SUBCUTANEOUS
COMMUNITY

## 2023-12-21 RX ORDER — INSULIN LISPRO 100 [IU]/ML
INJECTION, SOLUTION INTRAVENOUS; SUBCUTANEOUS
Status: ON HOLD | COMMUNITY
End: 2023-12-24

## 2023-12-21 RX ORDER — MAGNESIUM SULFATE IN WATER 40 MG/ML
2000 INJECTION, SOLUTION INTRAVENOUS ONCE
Status: COMPLETED | OUTPATIENT
Start: 2023-12-21 | End: 2023-12-21

## 2023-12-21 RX ORDER — SODIUM CHLORIDE 9 MG/ML
INJECTION, SOLUTION INTRAVENOUS PRN
Status: DISCONTINUED | OUTPATIENT
Start: 2023-12-21 | End: 2023-12-24 | Stop reason: HOSPADM

## 2023-12-21 RX ORDER — ONDANSETRON 2 MG/ML
4 INJECTION INTRAMUSCULAR; INTRAVENOUS ONCE
Status: DISCONTINUED | OUTPATIENT
Start: 2023-12-21 | End: 2023-12-21

## 2023-12-21 RX ORDER — SODIUM CHLORIDE 9 MG/ML
INJECTION, SOLUTION INTRAVENOUS CONTINUOUS
Status: DISCONTINUED | OUTPATIENT
Start: 2023-12-21 | End: 2023-12-24 | Stop reason: HOSPADM

## 2023-12-21 RX ORDER — POLYETHYLENE GLYCOL 3350 17 G/17G
17 POWDER, FOR SOLUTION ORAL DAILY PRN
Status: DISCONTINUED | OUTPATIENT
Start: 2023-12-21 | End: 2023-12-24 | Stop reason: HOSPADM

## 2023-12-21 RX ORDER — FOLIC ACID 1 MG/1
1 TABLET ORAL DAILY
Status: DISCONTINUED | OUTPATIENT
Start: 2023-12-21 | End: 2023-12-24 | Stop reason: HOSPADM

## 2023-12-21 RX ADMIN — POTASSIUM CHLORIDE 10 MEQ: 10 INJECTION, SOLUTION INTRAVENOUS at 23:00

## 2023-12-21 RX ADMIN — PANTOPRAZOLE SODIUM 40 MG: 40 INJECTION, POWDER, FOR SOLUTION INTRAVENOUS at 09:38

## 2023-12-21 RX ADMIN — ALCOHOL 1 TABLET: 70.47 GEL TOPICAL at 20:29

## 2023-12-21 RX ADMIN — SODIUM CHLORIDE 1000 ML: 9 INJECTION, SOLUTION INTRAVENOUS at 09:06

## 2023-12-21 RX ADMIN — SODIUM CHLORIDE 1000 ML: 9 INJECTION, SOLUTION INTRAVENOUS at 10:35

## 2023-12-21 RX ADMIN — SODIUM PHOSPHATE, MONOBASIC, MONOHYDRATE AND SODIUM PHOSPHATE, DIBASIC, ANHYDROUS 15 MMOL: 142; 276 INJECTION, SOLUTION INTRAVENOUS at 21:46

## 2023-12-21 RX ADMIN — DEXTROSE AND SODIUM CHLORIDE: 5; 450 INJECTION, SOLUTION INTRAVENOUS at 18:14

## 2023-12-21 RX ADMIN — SODIUM CHLORIDE 8.08 UNITS/HR: 9 INJECTION, SOLUTION INTRAVENOUS at 12:08

## 2023-12-21 RX ADMIN — SODIUM CHLORIDE: 9 INJECTION, SOLUTION INTRAVENOUS at 21:43

## 2023-12-21 RX ADMIN — OXYCODONE 5 MG: 5 TABLET ORAL at 17:44

## 2023-12-21 RX ADMIN — PANTOPRAZOLE SODIUM 40 MG: 40 INJECTION, POWDER, FOR SOLUTION INTRAVENOUS at 20:28

## 2023-12-21 RX ADMIN — MAGNESIUM SULFATE HEPTAHYDRATE 2000 MG: 40 INJECTION, SOLUTION INTRAVENOUS at 13:32

## 2023-12-21 RX ADMIN — SODIUM CHLORIDE 5.81 UNITS/HR: 9 INJECTION, SOLUTION INTRAVENOUS at 18:13

## 2023-12-21 RX ADMIN — METOCLOPRAMIDE 10 MG: 5 INJECTION, SOLUTION INTRAMUSCULAR; INTRAVENOUS at 09:37

## 2023-12-21 RX ADMIN — Medication 100 MG: at 17:44

## 2023-12-21 RX ADMIN — POTASSIUM CHLORIDE 10 MEQ: 10 INJECTION, SOLUTION INTRAVENOUS at 21:48

## 2023-12-21 RX ADMIN — CEFTRIAXONE SODIUM 1000 MG: 1 INJECTION, POWDER, FOR SOLUTION INTRAMUSCULAR; INTRAVENOUS at 13:24

## 2023-12-21 RX ADMIN — SODIUM CHLORIDE 1674 ML: 9 INJECTION, SOLUTION INTRAVENOUS at 12:09

## 2023-12-21 RX ADMIN — SODIUM CHLORIDE: 9 INJECTION, SOLUTION INTRAVENOUS at 14:56

## 2023-12-21 RX ADMIN — SODIUM CHLORIDE, PRESERVATIVE FREE 10 ML: 5 INJECTION INTRAVENOUS at 20:29

## 2023-12-21 RX ADMIN — FOLIC ACID 1 MG: 1 TABLET ORAL at 17:44

## 2023-12-21 NOTE — ED NOTES
[address] [place] [address] ED  Emergency Department Encounter  Emergency Medicine Resident     Pt Arthur Nava  MRN: [id number]  [facility] 1990  Date of evaluation: 23  PCP:  Mikaela Vallejo PA-C  Note Started: 8:56 AM EST      CHIEF COMPLAINT       Chief Complaint   Patient presents with    Emesis    Abdominal Pain    Constipation       HISTORY OF PRESENT ILLNESS  (Location/Symptom, Timing/Onset, Context/Setting, Quality, Duration, Modifying Factors, Severity.)      Shabbir Jimenez is a 28 y.o. female with past medical history of type 2 diabetes mellitus, alcoholic who presents with vomiting, abdominal pain and constipation. The patient complains of emesis since yesterday. The vomiting is initially watery, multiple episodes as per her had slight blood in vomitus but this morning was black tarry. Associated with abdominal pain which is epigastric, sharp, 8 on 10, radiating to the back associated with some constipation for a few days,loss of appetite. She denies any fever, no blood in stools, no history of variceal bleed, alcohol withdrawal, dysuria, hematuria, chest pain, shortness of breath. Please note that 2 months ago the patient went into diabetic coma and had renal failure underwent dialysis, pancreatitis for which she underwent treatment and was in rehabilitation center  for 3 months. Currently she is on NovoLog for her diabetes and claims to be compliant. She is also an alcoholic and as per sister binge drinks. Patient is unaware of her last drink. Also patients sister  claims that 2 years ago when she had COVID she had DVT and had a PE and  cardiac arrest. Currently she claims she  is not on any anticoagulant. PAST MEDICAL / SURGICAL / SOCIAL / FAMILY HISTORY      has a past medical history of Depression, Hypertension, Obesity, and Postpartum depression. has a past surgical history that includes  section and knee surgery (Right, [id number]).       Social

## 2023-12-21 NOTE — ED NOTES
Pt arrives via triage, pt c/o of abd pain and black emesis. Pt states the vomiting starting yesterday and the color was been black. Since then she's been vomiting water. The ABD pain started along with the vomiting. Pt states she has no problems with urinating, but she has not pooped in a couple days. Pt hx of short term memory and mental delay. Pt hx of pancreatitis, and diabetes.

## 2023-12-22 PROBLEM — K86.89 PANCREATIC MASS: Status: ACTIVE | Noted: 2023-12-22

## 2023-12-22 PROBLEM — K86.2 PANCREATIC CYST: Status: ACTIVE | Noted: 2023-12-22

## 2023-12-22 LAB
ALBUMIN SERPL-MCNC: 3.3 G/DL (ref 3.5–5.2)
ALBUMIN/GLOB SERPL: 1.1 {RATIO} (ref 1–2.5)
ALP SERPL-CCNC: 136 U/L (ref 35–104)
ALT SERPL-CCNC: 5 U/L (ref 5–33)
ANION GAP SERPL CALCULATED.3IONS-SCNC: 11 MMOL/L (ref 9–17)
ANION GAP SERPL CALCULATED.3IONS-SCNC: 15 MMOL/L (ref 9–17)
ANION GAP SERPL CALCULATED.3IONS-SCNC: 15 MMOL/L (ref 9–17)
ANION GAP SERPL CALCULATED.3IONS-SCNC: 18 MMOL/L (ref 9–17)
ANION GAP SERPL CALCULATED.3IONS-SCNC: 19 MMOL/L (ref 9–17)
APAP SERPL-MCNC: 5 UG/ML (ref 10–30)
AST SERPL-CCNC: 11 U/L
B-OH-BUTYR SERPL-MCNC: 1.13 MMOL/L (ref 0.02–0.27)
BASOPHILS # BLD: 0.03 K/UL (ref 0–0.2)
BASOPHILS NFR BLD: 0 % (ref 0–2)
BILIRUB DIRECT SERPL-MCNC: 0.1 MG/DL
BILIRUB INDIRECT SERPL-MCNC: 0.2 MG/DL (ref 0–1)
BILIRUB SERPL-MCNC: 0.3 MG/DL (ref 0.3–1.2)
BODY TEMPERATURE: 37
BUN SERPL-MCNC: 10 MG/DL (ref 6–20)
BUN SERPL-MCNC: 7 MG/DL (ref 6–20)
BUN SERPL-MCNC: 8 MG/DL (ref 6–20)
CALCIUM SERPL-MCNC: 8.3 MG/DL (ref 8.6–10.4)
CALCIUM SERPL-MCNC: 8.7 MG/DL (ref 8.6–10.4)
CALCIUM SERPL-MCNC: 8.8 MG/DL (ref 8.6–10.4)
CALCIUM SERPL-MCNC: 8.8 MG/DL (ref 8.6–10.4)
CALCIUM SERPL-MCNC: 8.9 MG/DL (ref 8.6–10.4)
CEA SERPL-MCNC: 2.6 NG/ML
CHLORIDE SERPL-SCNC: 104 MMOL/L (ref 98–107)
CHLORIDE SERPL-SCNC: 106 MMOL/L (ref 98–107)
CHLORIDE SERPL-SCNC: 108 MMOL/L (ref 98–107)
CHLORIDE SERPL-SCNC: 108 MMOL/L (ref 98–107)
CHLORIDE SERPL-SCNC: 110 MMOL/L (ref 98–107)
CO2 SERPL-SCNC: 12 MMOL/L (ref 20–31)
CO2 SERPL-SCNC: 13 MMOL/L (ref 20–31)
CO2 SERPL-SCNC: 14 MMOL/L (ref 20–31)
CO2 SERPL-SCNC: 16 MMOL/L (ref 20–31)
CO2 SERPL-SCNC: 7 MMOL/L (ref 20–31)
COHGB MFR BLD: 1 % (ref 0–5)
CREAT SERPL-MCNC: 0.9 MG/DL (ref 0.5–0.9)
EKG ATRIAL RATE: 83 BPM
EKG P AXIS: 25 DEGREES
EKG P-R INTERVAL: 162 MS
EKG Q-T INTERVAL: 388 MS
EKG QRS DURATION: 86 MS
EKG QTC CALCULATION (BAZETT): 455 MS
EKG R AXIS: -38 DEGREES
EKG T AXIS: 30 DEGREES
EKG VENTRICULAR RATE: 83 BPM
EOSINOPHIL # BLD: <0.03 K/UL (ref 0–0.44)
EOSINOPHILS RELATIVE PERCENT: 0 % (ref 1–4)
ERYTHROCYTE [DISTWIDTH] IN BLOOD BY AUTOMATED COUNT: 12.9 % (ref 11.8–14.4)
FIO2 ON VENT: ABNORMAL %
GFR SERPL CREATININE-BSD FRML MDRD: >60 ML/MIN/1.73M2
GLUCOSE BLD-MCNC: 111 MG/DL (ref 65–105)
GLUCOSE BLD-MCNC: 141 MG/DL (ref 65–105)
GLUCOSE BLD-MCNC: 141 MG/DL (ref 65–105)
GLUCOSE BLD-MCNC: 149 MG/DL (ref 65–105)
GLUCOSE BLD-MCNC: 150 MG/DL (ref 65–105)
GLUCOSE BLD-MCNC: 150 MG/DL (ref 65–105)
GLUCOSE BLD-MCNC: 156 MG/DL (ref 65–105)
GLUCOSE BLD-MCNC: 156 MG/DL (ref 65–105)
GLUCOSE BLD-MCNC: 160 MG/DL (ref 65–105)
GLUCOSE BLD-MCNC: 161 MG/DL (ref 65–105)
GLUCOSE BLD-MCNC: 164 MG/DL (ref 65–105)
GLUCOSE BLD-MCNC: 173 MG/DL (ref 65–105)
GLUCOSE BLD-MCNC: 174 MG/DL (ref 65–105)
GLUCOSE BLD-MCNC: 178 MG/DL (ref 65–105)
GLUCOSE BLD-MCNC: 181 MG/DL (ref 65–105)
GLUCOSE BLD-MCNC: 182 MG/DL (ref 65–105)
GLUCOSE BLD-MCNC: 184 MG/DL (ref 65–105)
GLUCOSE BLD-MCNC: 194 MG/DL (ref 65–105)
GLUCOSE BLD-MCNC: 201 MG/DL (ref 65–105)
GLUCOSE SERPL-MCNC: 148 MG/DL (ref 70–99)
GLUCOSE SERPL-MCNC: 168 MG/DL (ref 70–99)
GLUCOSE SERPL-MCNC: 179 MG/DL (ref 70–99)
GLUCOSE SERPL-MCNC: 189 MG/DL (ref 70–99)
GLUCOSE SERPL-MCNC: 199 MG/DL (ref 70–99)
HCO3 VENOUS: 18.3 MMOL/L (ref 24–30)
HCT VFR BLD AUTO: 46.5 % (ref 36.3–47.1)
HGB BLD-MCNC: 15.2 G/DL (ref 11.9–15.1)
IMM GRANULOCYTES # BLD AUTO: <0.03 K/UL (ref 0–0.3)
IMM GRANULOCYTES NFR BLD: 0 %
LACTIC ACID, WHOLE BLOOD: 1.9 MMOL/L (ref 0.7–2.1)
LACTIC ACID, WHOLE BLOOD: 3.2 MMOL/L (ref 0.7–2.1)
LYMPHOCYTES NFR BLD: 1.76 K/UL (ref 1.1–3.7)
LYMPHOCYTES RELATIVE PERCENT: 18 % (ref 24–43)
MAGNESIUM SERPL-MCNC: 1.7 MG/DL (ref 1.6–2.6)
MAGNESIUM SERPL-MCNC: 2 MG/DL (ref 1.6–2.6)
MAGNESIUM SERPL-MCNC: 2 MG/DL (ref 1.6–2.6)
MAGNESIUM SERPL-MCNC: 2.1 MG/DL (ref 1.6–2.6)
MAGNESIUM SERPL-MCNC: 2.2 MG/DL (ref 1.6–2.6)
MCH RBC QN AUTO: 30.6 PG (ref 25.2–33.5)
MCHC RBC AUTO-ENTMCNC: 32.7 G/DL (ref 28.4–34.8)
MCV RBC AUTO: 93.6 FL (ref 82.6–102.9)
MICROORGANISM SPEC CULT: ABNORMAL
MONOCYTES NFR BLD: 0.84 K/UL (ref 0.1–1.2)
MONOCYTES NFR BLD: 9 % (ref 3–12)
NEGATIVE BASE EXCESS, VEN: 8.1 MMOL/L (ref 0–2)
NEUTROPHILS NFR BLD: 73 % (ref 36–65)
NEUTS SEG NFR BLD: 7.09 K/UL (ref 1.5–8.1)
NRBC BLD-RTO: 0 PER 100 WBC
O2 SAT, VEN: 42.5 % (ref 60–85)
PCO2, VEN: 41.9 MM HG (ref 39–55)
PH VENOUS: 7.26 (ref 7.32–7.42)
PHOSPHATE SERPL-MCNC: 1.2 MG/DL (ref 2.6–4.5)
PHOSPHATE SERPL-MCNC: 1.7 MG/DL (ref 2.6–4.5)
PHOSPHATE SERPL-MCNC: 1.7 MG/DL (ref 2.6–4.5)
PHOSPHATE SERPL-MCNC: 1.8 MG/DL (ref 2.6–4.5)
PHOSPHATE SERPL-MCNC: 2.2 MG/DL (ref 2.6–4.5)
PLATELET # BLD AUTO: 194 K/UL (ref 138–453)
PMV BLD AUTO: 11.7 FL (ref 8.1–13.5)
PO2, VEN: 26.7 MM HG (ref 30–50)
POTASSIUM SERPL-SCNC: 2.9 MMOL/L (ref 3.7–5.3)
POTASSIUM SERPL-SCNC: 3.1 MMOL/L (ref 3.7–5.3)
POTASSIUM SERPL-SCNC: 3.7 MMOL/L (ref 3.7–5.3)
POTASSIUM SERPL-SCNC: 3.9 MMOL/L (ref 3.7–5.3)
POTASSIUM SERPL-SCNC: 4 MMOL/L (ref 3.7–5.3)
PROT SERPL-MCNC: 6.4 G/DL (ref 6.4–8.3)
RBC # BLD AUTO: 4.97 M/UL (ref 3.95–5.11)
SALICYLATES SERPL-MCNC: <1 MG/DL (ref 3–10)
SODIUM SERPL-SCNC: 133 MMOL/L (ref 135–144)
SODIUM SERPL-SCNC: 134 MMOL/L (ref 135–144)
SODIUM SERPL-SCNC: 134 MMOL/L (ref 135–144)
SODIUM SERPL-SCNC: 137 MMOL/L (ref 135–144)
SODIUM SERPL-SCNC: 138 MMOL/L (ref 135–144)
SPECIMEN DESCRIPTION: ABNORMAL
WBC OTHER # BLD: 9.8 K/UL (ref 3.5–11.3)

## 2023-12-22 PROCEDURE — 82805 BLOOD GASES W/O2 SATURATION: CPT

## 2023-12-22 PROCEDURE — 97161 PT EVAL LOW COMPLEX 20 MIN: CPT

## 2023-12-22 PROCEDURE — 97535 SELF CARE MNGMENT TRAINING: CPT

## 2023-12-22 PROCEDURE — 97166 OT EVAL MOD COMPLEX 45 MIN: CPT

## 2023-12-22 PROCEDURE — 83735 ASSAY OF MAGNESIUM: CPT

## 2023-12-22 PROCEDURE — 2580000003 HC RX 258

## 2023-12-22 PROCEDURE — 99232 SBSQ HOSP IP/OBS MODERATE 35: CPT | Performed by: INTERNAL MEDICINE

## 2023-12-22 PROCEDURE — 2500000003 HC RX 250 WO HCPCS

## 2023-12-22 PROCEDURE — 6360000002 HC RX W HCPCS

## 2023-12-22 PROCEDURE — 82378 CARCINOEMBRYONIC ANTIGEN: CPT

## 2023-12-22 PROCEDURE — A4216 STERILE WATER/SALINE, 10 ML: HCPCS

## 2023-12-22 PROCEDURE — 85025 COMPLETE CBC W/AUTO DIFF WBC: CPT

## 2023-12-22 PROCEDURE — 82947 ASSAY GLUCOSE BLOOD QUANT: CPT

## 2023-12-22 PROCEDURE — 97530 THERAPEUTIC ACTIVITIES: CPT

## 2023-12-22 PROCEDURE — 2060000000 HC ICU INTERMEDIATE R&B

## 2023-12-22 PROCEDURE — 80076 HEPATIC FUNCTION PANEL: CPT

## 2023-12-22 PROCEDURE — 36415 COLL VENOUS BLD VENIPUNCTURE: CPT

## 2023-12-22 PROCEDURE — 82010 KETONE BODYS QUAN: CPT

## 2023-12-22 PROCEDURE — 84100 ASSAY OF PHOSPHORUS: CPT

## 2023-12-22 PROCEDURE — C9113 INJ PANTOPRAZOLE SODIUM, VIA: HCPCS

## 2023-12-22 PROCEDURE — 80048 BASIC METABOLIC PNL TOTAL CA: CPT

## 2023-12-22 RX ORDER — OXYCODONE HYDROCHLORIDE 5 MG/1
5 TABLET ORAL EVERY 6 HOURS PRN
Status: DISCONTINUED | OUTPATIENT
Start: 2023-12-22 | End: 2023-12-24 | Stop reason: HOSPADM

## 2023-12-22 RX ORDER — POTASSIUM CHLORIDE 7.45 MG/ML
10 INJECTION INTRAVENOUS
Status: COMPLETED | OUTPATIENT
Start: 2023-12-22 | End: 2023-12-23

## 2023-12-22 RX ADMIN — SODIUM CHLORIDE, PRESERVATIVE FREE 5 ML: 5 INJECTION INTRAVENOUS at 21:33

## 2023-12-22 RX ADMIN — POTASSIUM CHLORIDE 10 MEQ: 10 INJECTION, SOLUTION INTRAVENOUS at 23:18

## 2023-12-22 RX ADMIN — POTASSIUM CHLORIDE 10 MEQ: 10 INJECTION, SOLUTION INTRAVENOUS at 14:51

## 2023-12-22 RX ADMIN — Medication 1000 MG: at 12:43

## 2023-12-22 RX ADMIN — DEXTROSE AND SODIUM CHLORIDE: 5; 450 INJECTION, SOLUTION INTRAVENOUS at 21:27

## 2023-12-22 RX ADMIN — POTASSIUM CHLORIDE 10 MEQ: 10 INJECTION, SOLUTION INTRAVENOUS at 00:05

## 2023-12-22 RX ADMIN — SODIUM CHLORIDE, PRESERVATIVE FREE 10 ML: 5 INJECTION INTRAVENOUS at 08:38

## 2023-12-22 RX ADMIN — POTASSIUM CHLORIDE 10 MEQ: 10 INJECTION, SOLUTION INTRAVENOUS at 22:16

## 2023-12-22 RX ADMIN — PANTOPRAZOLE SODIUM 40 MG: 40 INJECTION, POWDER, FOR SOLUTION INTRAVENOUS at 21:14

## 2023-12-22 RX ADMIN — DEXTROSE AND SODIUM CHLORIDE: 5; 450 INJECTION, SOLUTION INTRAVENOUS at 00:45

## 2023-12-22 RX ADMIN — PANTOPRAZOLE SODIUM 40 MG: 40 INJECTION, POWDER, FOR SOLUTION INTRAVENOUS at 08:38

## 2023-12-22 RX ADMIN — POTASSIUM CHLORIDE 10 MEQ: 10 INJECTION, SOLUTION INTRAVENOUS at 18:20

## 2023-12-22 RX ADMIN — POTASSIUM PHOSPHATE, MONOBASIC POTASSIUM PHOSPHATE, DIBASIC 15 MMOL: 224; 236 INJECTION, SOLUTION, CONCENTRATE INTRAVENOUS at 12:48

## 2023-12-22 RX ADMIN — POTASSIUM CHLORIDE 10 MEQ: 10 INJECTION, SOLUTION INTRAVENOUS at 19:46

## 2023-12-22 RX ADMIN — DEXTROSE AND SODIUM CHLORIDE: 5; 450 INJECTION, SOLUTION INTRAVENOUS at 08:01

## 2023-12-22 RX ADMIN — POTASSIUM CHLORIDE 10 MEQ: 10 INJECTION, SOLUTION INTRAVENOUS at 17:01

## 2023-12-22 RX ADMIN — DEXTROSE AND SODIUM CHLORIDE: 5; 450 INJECTION, SOLUTION INTRAVENOUS at 14:38

## 2023-12-22 RX ADMIN — POTASSIUM CHLORIDE 10 MEQ: 10 INJECTION, SOLUTION INTRAVENOUS at 21:14

## 2023-12-22 NOTE — CARE COORDINATION
.Case Management Assessment  Initial Evaluation    Date/Time of Evaluation: 12/22/2023 10:18 AM  Assessment Completed by: Becca Rosen RN    If patient is discharged prior to next notation, then this note serves as note for discharge by case management. Patient Name: Brittany Gunn                   YOB: 1990  Diagnosis: DKA, type 2, not at goal [facility]) [E11.10]                   Date / Time: 12/21/2023  8:18 AM    Patient Admission Status: Inpatient   Readmission Risk (Low < 19, Mod (19-27), High > 27): Readmission Risk Score: 8.4    Current PCP: Kianna Alcantar PA-C  PCP verified by CM? Yes    Chart Reviewed: Yes      History Provided by: Patient  Patient Orientation: Alert and Oriented    Patient Cognition: Alert    Hospitalization in the last 30 days (Readmission):  No    If yes, Readmission Assessment in CM Navigator will be completed. Advance Directives:      Code Status: Full Code   Patient's Primary Decision Maker is: Legal Next of Kin    Primary Decision Maker: Tiana Mixon - Brother/Sister - [phone]    Discharge Planning:    Patient lives with: Family Members Type of Home: House  Primary Care Giver: Self  Patient Support Systems include: Family Members   Current Financial resources: HELP, Medicaid  Current community resources: None  Current services prior to admission: None            Current DME:              Type of Home Care services:  None    ADLS  Prior functional level: Independent in ADLs/IADLs  Current functional level: Independent in ADLs/IADLs    PT AM-PAC:   /24  OT AM-PAC:   /24    Family can provide assistance at DC: Yes  Would you like Case Management to discuss the discharge plan with any other family members/significant others, and if so, who?  Yes  Plans to Return to Present Housing: Yes  Other Identified Issues/Barriers to RETURNING to current housing: none  Potential Assistance needed at discharge: [address], Transportation, Prescription Assistance

## 2023-12-22 NOTE — PLAN OF CARE
Problem: Discharge Planning  Goal: Discharge to home or other facility with appropriate resources  Outcome: Progressing  Flowsheets (Taken 12/21/2023 2000)  Discharge to home or other facility with appropriate resources:   Identify barriers to discharge with patient and caregiver   Identify discharge learning needs (meds, wound care, etc)     Problem: Pain  Goal: Verbalizes/displays adequate comfort level or baseline comfort level  Outcome: Progressing     Problem: Safety - Adult  Goal: Free from fall injury  Outcome: Progressing  Flowsheets (Taken 12/21/2023 2000)  Free From Fall Injury:   Instruct family/caregiver on patient safety   Based on caregiver fall risk screen, instruct family/caregiver to ask for assistance with transferring infant if caregiver noted to have fall risk factors     Problem: Chronic Conditions and Co-morbidities  Goal: Patient's chronic conditions and co-morbidity symptoms are monitored and maintained or improved  Outcome: Progressing  Flowsheets (Taken 12/21/2023 2000)  Care Plan - Patient's Chronic Conditions and Co-Morbidity Symptoms are Monitored and Maintained or Improved:   Monitor and assess patient's chronic conditions and comorbid symptoms for stability, deterioration, or improvement   Update acute care plan with appropriate goals if chronic or comorbid symptoms are exacerbated and prevent overall improvement and discharge

## 2023-12-22 NOTE — H&P
[address]     Department of Internal Medicine - Staff Internal Medicine Teaching Service          ADMISSION NOTE/HISTORY AND PHYSICAL EXAMINATION   Date: 12/21/2023  Patient Name: Mihir Cassidy  Date of admission: 12/21/2023  8:18 AM  YOB: 1990  PCP: Dayanara Morrison PA-C  History Obtained From:  patient    CHIEF COMPLAINT     Chief complaint: Abdominal pain,vomiting,constipation    HISTORY OF PRESENTING ILLNESS     The patient is a pleasant 28 y.o. female with DM on insulin, Alcohol abuse with h/o Wernicke's encephalopathy, h/o gastric sleeve surgery,h /o massive PE with infarction on Elliquis, CIDP secondary to [place] in 2020 on IVIG, multiple episodes of pancreatitis presented with vomiting and abdominal pain. She complained of constant non radiating abdominal pain in the epigastric region for last few days with some tenderness in the region accompanied by vomiting episodes. She reported she was unable to tolerate any diet and initial episodes were non bloody but later it progressively became coffee ground/blackish. Tommie Davison had not had any bowel movements since past few days. Patient states was compliant with her medications. Two months ago,she was hospitalized for DKA with pancreatitis with hospital course complicated by hypoxia requiring intubation,hypotension requiring vasopressors and anuric renal failure requiring dialysis. She was discharged to Transitional Care Unit. Labs revealed elevated glucose 563 with Bicarb 7 and AG 32 and Beta hydroxybutyrate was 8.47 and Lactic acid 4.8. Sodium was 128,Cr 1.4, Hb 18.1 and WBC was 15. Lipase normal.UA was indicative of UTI. Troponins were wnl and EKG revealed prolonged QT. Patient received fluids and started on DKA protocol with ceftriaxone. GI was consulted for possible upper GI bleed.       ROS - Negative except mentioned in the HPI    PAST MEDICAL HISTORY     Past Medical History:   Diagnosis Date

## 2023-12-23 PROBLEM — K86.1 CHRONIC PANCREATITIS (HCC): Status: ACTIVE | Noted: 2023-12-23

## 2023-12-23 PROBLEM — F10.21 HISTORY OF ALCOHOLISM (HCC): Status: ACTIVE | Noted: 2023-12-23

## 2023-12-23 LAB
ANION GAP SERPL CALCULATED.3IONS-SCNC: 10 MMOL/L (ref 9–17)
ANION GAP SERPL CALCULATED.3IONS-SCNC: 11 MMOL/L (ref 9–17)
ANION GAP SERPL CALCULATED.3IONS-SCNC: 8 MMOL/L (ref 9–17)
BASOPHILS # BLD: 0.07 K/UL (ref 0–0.2)
BASOPHILS NFR BLD: 1 % (ref 0–2)
BUN SERPL-MCNC: 5 MG/DL (ref 6–20)
BUN SERPL-MCNC: 5 MG/DL (ref 6–20)
BUN SERPL-MCNC: 6 MG/DL (ref 6–20)
CALCIUM SERPL-MCNC: 8.2 MG/DL (ref 8.6–10.4)
CALCIUM SERPL-MCNC: 8.4 MG/DL (ref 8.6–10.4)
CALCIUM SERPL-MCNC: 8.5 MG/DL (ref 8.6–10.4)
CHLORIDE SERPL-SCNC: 108 MMOL/L (ref 98–107)
CHLORIDE SERPL-SCNC: 109 MMOL/L (ref 98–107)
CHLORIDE SERPL-SCNC: 110 MMOL/L (ref 98–107)
CO2 SERPL-SCNC: 14 MMOL/L (ref 20–31)
CO2 SERPL-SCNC: 16 MMOL/L (ref 20–31)
CO2 SERPL-SCNC: 17 MMOL/L (ref 20–31)
CREAT SERPL-MCNC: 0.9 MG/DL (ref 0.5–0.9)
EOSINOPHIL # BLD: 0.05 K/UL (ref 0–0.44)
EOSINOPHILS RELATIVE PERCENT: 1 % (ref 1–4)
ERYTHROCYTE [DISTWIDTH] IN BLOOD BY AUTOMATED COUNT: 12.8 % (ref 11.8–14.4)
GFR SERPL CREATININE-BSD FRML MDRD: >60 ML/MIN/1.73M2
GLUCOSE BLD-MCNC: 157 MG/DL (ref 65–105)
GLUCOSE BLD-MCNC: 159 MG/DL (ref 65–105)
GLUCOSE BLD-MCNC: 166 MG/DL (ref 65–105)
GLUCOSE BLD-MCNC: 167 MG/DL (ref 65–105)
GLUCOSE BLD-MCNC: 170 MG/DL (ref 65–105)
GLUCOSE BLD-MCNC: 170 MG/DL (ref 65–105)
GLUCOSE BLD-MCNC: 171 MG/DL (ref 65–105)
GLUCOSE BLD-MCNC: 174 MG/DL (ref 65–105)
GLUCOSE BLD-MCNC: 186 MG/DL (ref 65–105)
GLUCOSE BLD-MCNC: 194 MG/DL (ref 65–105)
GLUCOSE BLD-MCNC: 196 MG/DL (ref 65–105)
GLUCOSE BLD-MCNC: 200 MG/DL (ref 65–105)
GLUCOSE BLD-MCNC: 206 MG/DL (ref 65–105)
GLUCOSE SERPL-MCNC: 170 MG/DL (ref 70–99)
GLUCOSE SERPL-MCNC: 194 MG/DL (ref 70–99)
GLUCOSE SERPL-MCNC: 202 MG/DL (ref 70–99)
HCT VFR BLD AUTO: 45.2 % (ref 36.3–47.1)
HGB BLD-MCNC: 15 G/DL (ref 11.9–15.1)
IMM GRANULOCYTES # BLD AUTO: <0.03 K/UL (ref 0–0.3)
IMM GRANULOCYTES NFR BLD: 0 %
LYMPHOCYTES NFR BLD: 2.91 K/UL (ref 1.1–3.7)
LYMPHOCYTES RELATIVE PERCENT: 46 % (ref 24–43)
MAGNESIUM SERPL-MCNC: 1.7 MG/DL (ref 1.6–2.6)
MAGNESIUM SERPL-MCNC: 1.8 MG/DL (ref 1.6–2.6)
MAGNESIUM SERPL-MCNC: 1.9 MG/DL (ref 1.6–2.6)
MCH RBC QN AUTO: 30.5 PG (ref 25.2–33.5)
MCHC RBC AUTO-ENTMCNC: 33.2 G/DL (ref 28.4–34.8)
MCV RBC AUTO: 91.9 FL (ref 82.6–102.9)
MONOCYTES NFR BLD: 0.63 K/UL (ref 0.1–1.2)
MONOCYTES NFR BLD: 10 % (ref 3–12)
NEUTROPHILS NFR BLD: 42 % (ref 36–65)
NEUTS SEG NFR BLD: 2.6 K/UL (ref 1.5–8.1)
NRBC BLD-RTO: 0 PER 100 WBC
PHOSPHATE SERPL-MCNC: 1.3 MG/DL (ref 2.6–4.5)
PHOSPHATE SERPL-MCNC: 2.9 MG/DL (ref 2.6–4.5)
PLATELET # BLD AUTO: 174 K/UL (ref 138–453)
PMV BLD AUTO: 11.6 FL (ref 8.1–13.5)
POTASSIUM SERPL-SCNC: 3 MMOL/L (ref 3.7–5.3)
POTASSIUM SERPL-SCNC: 3.1 MMOL/L (ref 3.7–5.3)
POTASSIUM SERPL-SCNC: 3.7 MMOL/L (ref 3.7–5.3)
POTASSIUM SERPL-SCNC: 4.1 MMOL/L (ref 3.7–5.3)
RBC # BLD AUTO: 4.92 M/UL (ref 3.95–5.11)
SODIUM SERPL-SCNC: 134 MMOL/L (ref 135–144)
SODIUM SERPL-SCNC: 134 MMOL/L (ref 135–144)
SODIUM SERPL-SCNC: 135 MMOL/L (ref 135–144)
WBC OTHER # BLD: 6.3 K/UL (ref 3.5–11.3)

## 2023-12-23 PROCEDURE — 6360000002 HC RX W HCPCS

## 2023-12-23 PROCEDURE — A4216 STERILE WATER/SALINE, 10 ML: HCPCS

## 2023-12-23 PROCEDURE — 84100 ASSAY OF PHOSPHORUS: CPT

## 2023-12-23 PROCEDURE — 2060000000 HC ICU INTERMEDIATE R&B

## 2023-12-23 PROCEDURE — 2580000003 HC RX 258

## 2023-12-23 PROCEDURE — 83735 ASSAY OF MAGNESIUM: CPT

## 2023-12-23 PROCEDURE — 36415 COLL VENOUS BLD VENIPUNCTURE: CPT

## 2023-12-23 PROCEDURE — 82947 ASSAY GLUCOSE BLOOD QUANT: CPT

## 2023-12-23 PROCEDURE — C9113 INJ PANTOPRAZOLE SODIUM, VIA: HCPCS

## 2023-12-23 PROCEDURE — 99232 SBSQ HOSP IP/OBS MODERATE 35: CPT | Performed by: INTERNAL MEDICINE

## 2023-12-23 PROCEDURE — 85025 COMPLETE CBC W/AUTO DIFF WBC: CPT

## 2023-12-23 PROCEDURE — 84132 ASSAY OF SERUM POTASSIUM: CPT

## 2023-12-23 PROCEDURE — 6370000000 HC RX 637 (ALT 250 FOR IP)

## 2023-12-23 PROCEDURE — 2500000003 HC RX 250 WO HCPCS

## 2023-12-23 PROCEDURE — 80048 BASIC METABOLIC PNL TOTAL CA: CPT

## 2023-12-23 RX ORDER — INSULIN LISPRO 100 [IU]/ML
0-4 INJECTION, SOLUTION INTRAVENOUS; SUBCUTANEOUS NIGHTLY
Status: DISCONTINUED | OUTPATIENT
Start: 2023-12-23 | End: 2023-12-24 | Stop reason: HOSPADM

## 2023-12-23 RX ORDER — INSULIN LISPRO 100 [IU]/ML
0-8 INJECTION, SOLUTION INTRAVENOUS; SUBCUTANEOUS
Status: DISCONTINUED | OUTPATIENT
Start: 2023-12-23 | End: 2023-12-24 | Stop reason: HOSPADM

## 2023-12-23 RX ORDER — INSULIN LISPRO 100 [IU]/ML
5 INJECTION, SOLUTION INTRAVENOUS; SUBCUTANEOUS
Status: DISCONTINUED | OUTPATIENT
Start: 2023-12-23 | End: 2023-12-23

## 2023-12-23 RX ORDER — INSULIN GLARGINE 100 [IU]/ML
30 INJECTION, SOLUTION SUBCUTANEOUS DAILY
Status: DISCONTINUED | OUTPATIENT
Start: 2023-12-23 | End: 2023-12-24 | Stop reason: HOSPADM

## 2023-12-23 RX ORDER — DEXTROSE MONOHYDRATE 100 MG/ML
INJECTION, SOLUTION INTRAVENOUS CONTINUOUS PRN
Status: DISCONTINUED | OUTPATIENT
Start: 2023-12-23 | End: 2023-12-24 | Stop reason: HOSPADM

## 2023-12-23 RX ORDER — ENOXAPARIN SODIUM 100 MG/ML
30 INJECTION SUBCUTANEOUS 2 TIMES DAILY
Status: DISCONTINUED | OUTPATIENT
Start: 2023-12-23 | End: 2023-12-24 | Stop reason: HOSPADM

## 2023-12-23 RX ADMIN — SODIUM CHLORIDE, PRESERVATIVE FREE 10 ML: 5 INJECTION INTRAVENOUS at 21:17

## 2023-12-23 RX ADMIN — POTASSIUM CHLORIDE 10 MEQ: 10 INJECTION, SOLUTION INTRAVENOUS at 11:05

## 2023-12-23 RX ADMIN — INSULIN LISPRO 2 UNITS: 100 INJECTION, SOLUTION INTRAVENOUS; SUBCUTANEOUS at 16:14

## 2023-12-23 RX ADMIN — DEXTROSE AND SODIUM CHLORIDE: 5; 450 INJECTION, SOLUTION INTRAVENOUS at 04:35

## 2023-12-23 RX ADMIN — PANTOPRAZOLE SODIUM 40 MG: 40 INJECTION, POWDER, FOR SOLUTION INTRAVENOUS at 09:56

## 2023-12-23 RX ADMIN — POTASSIUM CHLORIDE 10 MEQ: 10 INJECTION, SOLUTION INTRAVENOUS at 12:37

## 2023-12-23 RX ADMIN — Medication 1000 MG: at 12:38

## 2023-12-23 RX ADMIN — POTASSIUM CHLORIDE 10 MEQ: 10 INJECTION, SOLUTION INTRAVENOUS at 14:18

## 2023-12-23 RX ADMIN — PANTOPRAZOLE SODIUM 40 MG: 40 INJECTION, POWDER, FOR SOLUTION INTRAVENOUS at 21:17

## 2023-12-23 RX ADMIN — ENOXAPARIN SODIUM 30 MG: 100 INJECTION SUBCUTANEOUS at 14:18

## 2023-12-23 RX ADMIN — SODIUM PHOSPHATE, MONOBASIC, MONOHYDRATE AND SODIUM PHOSPHATE, DIBASIC, ANHYDROUS 34.59 MMOL: 142; 276 INJECTION, SOLUTION INTRAVENOUS at 03:23

## 2023-12-23 RX ADMIN — POTASSIUM CHLORIDE 10 MEQ: 10 INJECTION, SOLUTION INTRAVENOUS at 10:02

## 2023-12-23 RX ADMIN — ENOXAPARIN SODIUM 30 MG: 100 INJECTION SUBCUTANEOUS at 21:17

## 2023-12-23 RX ADMIN — INSULIN GLARGINE 30 UNITS: 100 INJECTION, SOLUTION SUBCUTANEOUS at 10:49

## 2023-12-24 ENCOUNTER — APPOINTMENT (OUTPATIENT)
Dept: MRI IMAGING | Age: 33
DRG: 420 | End: 2023-12-24
Payer: MEDICAID

## 2023-12-24 VITALS
RESPIRATION RATE: 16 BRPM | TEMPERATURE: 98.1 F | SYSTOLIC BLOOD PRESSURE: 102 MMHG | HEIGHT: 67 IN | OXYGEN SATURATION: 98 % | HEART RATE: 76 BPM | WEIGHT: 248.46 LBS | BODY MASS INDEX: 39 KG/M2 | DIASTOLIC BLOOD PRESSURE: 76 MMHG

## 2023-12-24 LAB
ANION GAP SERPL CALCULATED.3IONS-SCNC: 12 MMOL/L (ref 9–17)
BASOPHILS # BLD: 0.07 K/UL (ref 0–0.2)
BASOPHILS NFR BLD: 2 % (ref 0–2)
BUN SERPL-MCNC: 5 MG/DL (ref 6–20)
CALCIUM SERPL-MCNC: 8.2 MG/DL (ref 8.6–10.4)
CHLORIDE SERPL-SCNC: 108 MMOL/L (ref 98–107)
CO2 SERPL-SCNC: 17 MMOL/L (ref 20–31)
CREAT SERPL-MCNC: 0.7 MG/DL (ref 0.5–0.9)
EOSINOPHIL # BLD: 0.05 K/UL (ref 0–0.44)
EOSINOPHILS RELATIVE PERCENT: 1 % (ref 1–4)
ERYTHROCYTE [DISTWIDTH] IN BLOOD BY AUTOMATED COUNT: 13 % (ref 11.8–14.4)
GFR SERPL CREATININE-BSD FRML MDRD: >60 ML/MIN/1.73M2
GLUCOSE BLD-MCNC: 197 MG/DL (ref 65–105)
GLUCOSE SERPL-MCNC: 194 MG/DL (ref 70–99)
HCT VFR BLD AUTO: 40.5 % (ref 36.3–47.1)
HGB BLD-MCNC: 13.7 G/DL (ref 11.9–15.1)
IMM GRANULOCYTES # BLD AUTO: <0.03 K/UL (ref 0–0.3)
IMM GRANULOCYTES NFR BLD: 0 %
LYMPHOCYTES NFR BLD: 2.26 K/UL (ref 1.1–3.7)
LYMPHOCYTES RELATIVE PERCENT: 46 % (ref 24–43)
MAGNESIUM SERPL-MCNC: 1.6 MG/DL (ref 1.6–2.6)
MCH RBC QN AUTO: 30.3 PG (ref 25.2–33.5)
MCHC RBC AUTO-ENTMCNC: 33.8 G/DL (ref 28.4–34.8)
MCV RBC AUTO: 89.6 FL (ref 82.6–102.9)
MONOCYTES NFR BLD: 0.56 K/UL (ref 0.1–1.2)
MONOCYTES NFR BLD: 12 % (ref 3–12)
NEUTROPHILS NFR BLD: 39 % (ref 36–65)
NEUTS SEG NFR BLD: 1.85 K/UL (ref 1.5–8.1)
NRBC BLD-RTO: 0 PER 100 WBC
PLATELET # BLD AUTO: 166 K/UL (ref 138–453)
PMV BLD AUTO: 12.5 FL (ref 8.1–13.5)
POTASSIUM SERPL-SCNC: 3.2 MMOL/L (ref 3.7–5.3)
RBC # BLD AUTO: 4.52 M/UL (ref 3.95–5.11)
SODIUM SERPL-SCNC: 137 MMOL/L (ref 135–144)
WBC OTHER # BLD: 4.8 K/UL (ref 3.5–11.3)

## 2023-12-24 PROCEDURE — C9113 INJ PANTOPRAZOLE SODIUM, VIA: HCPCS

## 2023-12-24 PROCEDURE — 82947 ASSAY GLUCOSE BLOOD QUANT: CPT

## 2023-12-24 PROCEDURE — 85025 COMPLETE CBC W/AUTO DIFF WBC: CPT

## 2023-12-24 PROCEDURE — 6360000002 HC RX W HCPCS

## 2023-12-24 PROCEDURE — 6360000004 HC RX CONTRAST MEDICATION: Performed by: INTERNAL MEDICINE

## 2023-12-24 PROCEDURE — A9579 GAD-BASE MR CONTRAST NOS,1ML: HCPCS | Performed by: INTERNAL MEDICINE

## 2023-12-24 PROCEDURE — 94760 N-INVAS EAR/PLS OXIMETRY 1: CPT

## 2023-12-24 PROCEDURE — 6370000000 HC RX 637 (ALT 250 FOR IP)

## 2023-12-24 PROCEDURE — 83735 ASSAY OF MAGNESIUM: CPT

## 2023-12-24 PROCEDURE — 36415 COLL VENOUS BLD VENIPUNCTURE: CPT

## 2023-12-24 PROCEDURE — 99232 SBSQ HOSP IP/OBS MODERATE 35: CPT | Performed by: INTERNAL MEDICINE

## 2023-12-24 PROCEDURE — 74183 MRI ABD W/O CNTR FLWD CNTR: CPT

## 2023-12-24 PROCEDURE — A4216 STERILE WATER/SALINE, 10 ML: HCPCS

## 2023-12-24 PROCEDURE — 2580000003 HC RX 258: Performed by: INTERNAL MEDICINE

## 2023-12-24 PROCEDURE — 2580000003 HC RX 258

## 2023-12-24 PROCEDURE — 80048 BASIC METABOLIC PNL TOTAL CA: CPT

## 2023-12-24 RX ORDER — SODIUM CHLORIDE 0.9 % (FLUSH) 0.9 %
10 SYRINGE (ML) INJECTION PRN
Status: DISCONTINUED | OUTPATIENT
Start: 2023-12-24 | End: 2023-12-24 | Stop reason: HOSPADM

## 2023-12-24 RX ORDER — POTASSIUM CHLORIDE 20 MEQ/1
40 TABLET, EXTENDED RELEASE ORAL ONCE
Status: COMPLETED | OUTPATIENT
Start: 2023-12-24 | End: 2023-12-24

## 2023-12-24 RX ORDER — GLUCOSAMINE HCL/CHONDROITIN SU 500-400 MG
CAPSULE ORAL
Qty: 300 STRIP | Refills: 5 | Status: SHIPPED | OUTPATIENT
Start: 2023-12-24

## 2023-12-24 RX ORDER — BLOOD-GLUCOSE METER
1 KIT MISCELLANEOUS DAILY
Qty: 1 KIT | Refills: 0 | Status: SHIPPED | OUTPATIENT
Start: 2023-12-24

## 2023-12-24 RX ORDER — LANCETS 30 GAUGE
1 EACH MISCELLANEOUS DAILY
Qty: 100 EACH | Refills: 5 | Status: SHIPPED | OUTPATIENT
Start: 2023-12-24

## 2023-12-24 RX ORDER — 0.9 % SODIUM CHLORIDE 0.9 %
30 INTRAVENOUS SOLUTION INTRAVENOUS ONCE
Status: DISCONTINUED | OUTPATIENT
Start: 2023-12-24 | End: 2023-12-24 | Stop reason: HOSPADM

## 2023-12-24 RX ORDER — INSULIN LISPRO 100 [IU]/ML
2 INJECTION, SOLUTION INTRAVENOUS; SUBCUTANEOUS
Qty: 3 ML | Refills: 5 | Status: SHIPPED | OUTPATIENT
Start: 2023-12-24

## 2023-12-24 RX ORDER — INSULIN GLARGINE 100 [IU]/ML
30 INJECTION, SOLUTION SUBCUTANEOUS NIGHTLY
Qty: 5 ADJUSTABLE DOSE PRE-FILLED PEN SYRINGE | Refills: 5 | Status: SHIPPED | OUTPATIENT
Start: 2023-12-24

## 2023-12-24 RX ADMIN — POTASSIUM CHLORIDE 40 MEQ: 1500 TABLET, EXTENDED RELEASE ORAL at 15:47

## 2023-12-24 RX ADMIN — GADOTERIDOL 19 ML: 279.3 INJECTION, SOLUTION INTRAVENOUS at 09:31

## 2023-12-24 RX ADMIN — ALCOHOL 1 TABLET: 70.47 GEL TOPICAL at 10:40

## 2023-12-24 RX ADMIN — FOLIC ACID 1 MG: 1 TABLET ORAL at 10:40

## 2023-12-24 RX ADMIN — Medication 30 ML: at 09:32

## 2023-12-24 RX ADMIN — Medication 100 MG: at 10:40

## 2023-12-24 RX ADMIN — PANTOPRAZOLE SODIUM 40 MG: 40 INJECTION, POWDER, FOR SOLUTION INTRAVENOUS at 08:28

## 2023-12-24 RX ADMIN — ENOXAPARIN SODIUM 30 MG: 100 INJECTION SUBCUTANEOUS at 10:40

## 2023-12-24 RX ADMIN — Medication 30 ML: at 09:33

## 2023-12-24 RX ADMIN — INSULIN GLARGINE 30 UNITS: 100 INJECTION, SOLUTION SUBCUTANEOUS at 08:28

## 2023-12-24 RX ADMIN — SODIUM CHLORIDE, PRESERVATIVE FREE 10 ML: 5 INJECTION INTRAVENOUS at 09:34

## 2023-12-24 RX ADMIN — SODIUM CHLORIDE, PRESERVATIVE FREE 10 ML: 5 INJECTION INTRAVENOUS at 10:40

## 2023-12-24 NOTE — DISCHARGE INSTRUCTIONS
You were here for diabetic ketoacidosis because of noncompliance with insulin  You were treated with insulin drip and we breached you with long-acting insulin  Now we are discharging you on long-acting glargine 30 units subcutaneous nightly  Take short acting lispro 2 unit before breakfast, lunch, dinner and make sure to check your blood sugar level every time you take short acting lispro  Follow-up with endocrinologist in 1 week after hospital discharge, your insulin dosing has to be adjusted outpatient  During your hospital stay we performed MRI of your abdomen which showed Mildly complex, cystic mass in the pancreatic head. a pseudocyst in association with chronic pancreatitis would be suspected. Cystic neoplasm such as mucinous cystic neoplasm or IPMN may also be considered.    GI was consulted  GI recommended outpatient endoscopic ultrasound versus ERCP  Follow-up with GI  In 1 week

## 2023-12-26 LAB
GLUCOSE BLD-MCNC: 188 MG/DL (ref 65–105)
MICROORGANISM SPEC CULT: NORMAL
MICROORGANISM SPEC CULT: NORMAL
SERVICE CMNT-IMP: NORMAL
SERVICE CMNT-IMP: NORMAL
SPECIMEN DESCRIPTION: NORMAL
SPECIMEN DESCRIPTION: NORMAL

## 2023-12-27 ENCOUNTER — TELEPHONE (OUTPATIENT)
Age: 33
End: 2023-12-27

## 2023-12-27 ENCOUNTER — PATIENT OUTREACH (OUTPATIENT)
Dept: CARE COORDINATION | Facility: CLINIC | Age: 33
End: 2023-12-27
Payer: MEDICARE

## 2023-12-27 NOTE — PROGRESS NOTES
Clinic Care Coordination Contact  [facility]    Clinical Data: Care Coordinator Outreach. TCM program created. Enrolled patient was seen IP.     Outreach Documentation Number of Outreach Attempt   12/27/2023   9:48 AM 1       CC RN attempted to outreach to patient, call rang without going to . Unable to leave a message.     Plan: Care Coordinator will try to reach patient again in 1-2 business days.    Erum Soto RN, BSN, PHN Care Coordinator  [place], [place], and [place]   Phone: [phone]

## 2023-12-27 NOTE — TELEPHONE ENCOUNTER
----- Message from SHARDA Wright CNP sent at 12/23/2023  3:14 PM EST -----  Regarding: US and office visit  Patient was seen at [facility] for abnormal imaging showing pancreatic cyst.  Cyst noted in past on prior imaging. Patient to follow-up in office for chronic pancreatitis and pancreatic cyst Will need eventual EUS.

## 2023-12-28 NOTE — PROGRESS NOTES
"Clinic Care Coordination Contact  [facility]/Voicemail    Clinical Data: Care Coordinator Outreach    Outreach Documentation Number of Outreach Attempt   12/27/2023   9:48 AM 1   12/28/2023  10:16 AM 2       \"I'm sorry but the person you are trying to call has a VM box that has not been set up yet.\"     Plan: Care Coordinator will try to reach patient again in 10 business days.    Erum Soto RN, BSN, PHN Care Coordinator  [place], [place], and [place]   Phone: [phone]     "

## 2024-01-08 ENCOUNTER — TELEPHONE (OUTPATIENT)
Age: 34
End: 2024-01-08

## 2024-01-08 NOTE — TELEPHONE ENCOUNTER
Patient discharged from [facility] 12/24/2023, and need follow with Dr. Dowd, please call patient [phone] [facility]/sc

## 2024-01-17 ENCOUNTER — PATIENT OUTREACH (OUTPATIENT)
Dept: CARE COORDINATION | Facility: CLINIC | Age: 34
End: 2024-01-17
Payer: MEDICARE

## 2024-01-17 NOTE — PROGRESS NOTES
Clinic Care Coordination Contact  [facility]/Voicemail    Clinical Data: Care Coordinator Outreach    Outreach Documentation Number of Outreach Attempt   12/27/2023   9:48 AM 1   12/28/2023  10:16 AM 2   1/17/2024  11:08 AM 3     Left message on patient's voicemail with call back information and requested return call.    Plan: Care Coordinator will try to reach patient again in 10 business days.    Sumi Camarena, Andree RN Care Coordinator  [facility]  (Covering for Lead RN Care Coordinator)

## 2024-01-17 NOTE — Clinical Note
Good morning Crissy, Unsure if patient has moved to [place] or visiting. She sought care with their health system via IP stay, CC has been unable to get a hold of patient for follow up/further understanding of situation. We did a total of 4 outreach calls, standard outreach  attempts is 2. This additional support was provided due to patients hx of non compliance and medical history. At this time, I have disenrolled patient from CC due to unable to contact.  If patient enters back into [facility] system and there is a need for CC, please place a new CC order.  Thank you, Erum Soto RN, BSN, PHN Care Coordinator [place], [place], and [place]  Phone: [phone]

## 2024-01-20 PROBLEM — N39.0 UTI (URINARY TRACT INFECTION): Status: RESOLVED | Noted: 2023-12-21 | Resolved: 2024-01-20

## 2024-01-29 ENCOUNTER — TELEPHONE (OUTPATIENT)
Age: 34
End: 2024-01-29

## 2024-01-29 NOTE — TELEPHONE ENCOUNTER
Patient called to r/s missed appt from today 01/29, due to having a flat tire- she would like a call back to get r/s. I am only showing the next available is May, please reach out to pt [phone], the advise and thank you

## 2024-01-31 NOTE — PROGRESS NOTES
Clinic Care Coordination Contact  [facility]/Voicemail    Clinical Data: Enrolled patient was seen at [facility] in [place].   Reason for visit emesis, abdominal pain and constipation. Diagnosed with DKA, type 2, not at goal (HCC)     4 attempts by CC staff have been made to support patient. However, unable to reach patient, and she will not return VM's.       Outreach Documentation Number of Outreach Attempt   12/27/2023   9:48 AM 1   12/28/2023  10:16 AM 2   1/17/2024  11:08 AM 3   1/31/2024   9:20 AM 4       Left message on patient's voicemail with call back information and requested return call.    Plan: Care Coordinator will send disenrollment letter with care coordinator contact information via [employer]. Care Coordinator will do no further outreaches at this time.    LANCE to [facility] PCP.    Erum Soto RN, BSN, PHN Care Coordinator  [place], [place], and [place]   Phone: [phone]

## 2024-02-08 ENCOUNTER — HOSPITAL ENCOUNTER (INPATIENT)
Age: 34
LOS: 5 days | Discharge: HOME | DRG: 637 | End: 2024-02-14
Attending: EMERGENCY MEDICINE | Admitting: INTERNAL MEDICINE
Payer: MEDICARE

## 2024-02-08 DIAGNOSIS — N30.91 CYSTITIS WITH HEMATURIA: ICD-10-CM

## 2024-02-08 DIAGNOSIS — K86.89 MASS OF HEAD OF PANCREAS: ICD-10-CM

## 2024-02-08 DIAGNOSIS — R97.0 ELEVATED CEA: ICD-10-CM

## 2024-02-08 DIAGNOSIS — K85.11 ACUTE BILIARY PANCREATITIS WITH UNINFECTED NECROSIS: ICD-10-CM

## 2024-02-08 DIAGNOSIS — K85.90 ACUTE PANCREATITIS WITHOUT INFECTION OR NECROSIS, UNSPECIFIED PANCREATITIS TYPE: ICD-10-CM

## 2024-02-08 DIAGNOSIS — R73.9 HYPERGLYCEMIA: Primary | ICD-10-CM

## 2024-02-08 DIAGNOSIS — K86.89 PANCREATIC MASS: ICD-10-CM

## 2024-02-08 LAB
ALBUMIN SERPL-MCNC: 3.7 G/DL (ref 3.5–5.2)
ALBUMIN/GLOB SERPL: 0.9 {RATIO} (ref 1–2.5)
ALP SERPL-CCNC: 127 U/L (ref 35–104)
ALT SERPL-CCNC: 6 U/L (ref 5–33)
ANION GAP SERPL CALCULATED.3IONS-SCNC: 18 MMOL/L (ref 9–17)
AST SERPL-CCNC: 14 U/L
BACTERIA URNS QL MICRO: ABNORMAL
BASOPHILS # BLD: 0.05 K/UL (ref 0–0.2)
BASOPHILS NFR BLD: 1 % (ref 0–2)
BILIRUB SERPL-MCNC: 0.4 MG/DL (ref 0.3–1.2)
BILIRUB UR QL STRIP: NEGATIVE
BODY TEMPERATURE: 37
BODY TEMPERATURE: 37
BUN SERPL-MCNC: 9 MG/DL (ref 6–20)
CALCIUM SERPL-MCNC: 9.7 MG/DL (ref 8.6–10.4)
CASTS #/AREA URNS LPF: ABNORMAL /LPF (ref 0–8)
CHLORIDE SERPL-SCNC: 98 MMOL/L (ref 98–107)
CLARITY UR: ABNORMAL
CO2 SERPL-SCNC: 18 MMOL/L (ref 20–31)
COHGB MFR BLD: 3.9 % (ref 0–5)
COHGB MFR BLD: 5.3 % (ref 0–5)
COLOR UR: YELLOW
CREAT SERPL-MCNC: 0.8 MG/DL (ref 0.5–0.9)
EOSINOPHIL # BLD: 0.05 K/UL (ref 0–0.44)
EOSINOPHILS RELATIVE PERCENT: 1 % (ref 1–4)
EPI CELLS #/AREA URNS HPF: ABNORMAL /HPF (ref 0–5)
ERYTHROCYTE [DISTWIDTH] IN BLOOD BY AUTOMATED COUNT: 12.4 % (ref 11.8–14.4)
FIO2 ON VENT: ABNORMAL %
FIO2 ON VENT: ABNORMAL %
GFR SERPL CREATININE-BSD FRML MDRD: >60 ML/MIN/1.73M2
GLUCOSE BLD-MCNC: 298 MG/DL (ref 65–105)
GLUCOSE BLD-MCNC: 361 MG/DL (ref 65–105)
GLUCOSE SERPL-MCNC: 380 MG/DL (ref 70–99)
GLUCOSE UR STRIP-MCNC: ABNORMAL MG/DL
HCG SERPL QL: NEGATIVE
HCO3 VENOUS: 20.5 MMOL/L (ref 24–30)
HCO3 VENOUS: 20.8 MMOL/L (ref 24–30)
HCT VFR BLD AUTO: 49.2 % (ref 36.3–47.1)
HGB BLD-MCNC: 15.7 G/DL (ref 11.9–15.1)
HGB UR QL STRIP.AUTO: ABNORMAL
IMM GRANULOCYTES # BLD AUTO: 0.03 K/UL (ref 0–0.3)
IMM GRANULOCYTES NFR BLD: 0 %
KETONES UR STRIP-MCNC: ABNORMAL MG/DL
LEUKOCYTE ESTERASE UR QL STRIP: ABNORMAL
LIPASE SERPL-CCNC: 360 U/L (ref 13–60)
LYMPHOCYTES NFR BLD: 2.69 K/UL (ref 1.1–3.7)
LYMPHOCYTES RELATIVE PERCENT: 34 % (ref 24–43)
MCH RBC QN AUTO: 29.6 PG (ref 25.2–33.5)
MCHC RBC AUTO-ENTMCNC: 31.9 G/DL (ref 28.4–34.8)
MCV RBC AUTO: 92.8 FL (ref 82.6–102.9)
MONOCYTES NFR BLD: 0.49 K/UL (ref 0.1–1.2)
MONOCYTES NFR BLD: 6 % (ref 3–12)
NEGATIVE BASE EXCESS, VEN: 4.7 MMOL/L (ref 0–2)
NEGATIVE BASE EXCESS, VEN: 5.3 MMOL/L (ref 0–2)
NEUTROPHILS NFR BLD: 58 % (ref 36–65)
NEUTS SEG NFR BLD: 4.67 K/UL (ref 1.5–8.1)
NITRITE UR QL STRIP: NEGATIVE
NRBC BLD-RTO: 0 PER 100 WBC
O2 SAT, VEN: 84.4 % (ref 60–85)
O2 SAT, VEN: 92 % (ref 60–85)
PCO2, VEN: 40.7 MM HG (ref 39–55)
PCO2, VEN: 44.4 MM HG (ref 39–55)
PH UR STRIP: 5.5 [PH] (ref 5–8)
PH VENOUS: 7.29 (ref 7.32–7.42)
PH VENOUS: 7.32 (ref 7.32–7.42)
PLATELET # BLD AUTO: 260 K/UL (ref 138–453)
PMV BLD AUTO: 12.4 FL (ref 8.1–13.5)
PO2, VEN: 51.9 MM HG (ref 30–50)
PO2, VEN: 65.3 MM HG (ref 30–50)
POTASSIUM SERPL-SCNC: 4.8 MMOL/L (ref 3.7–5.3)
POTASSIUM SERPL-SCNC: 5.5 MMOL/L (ref 3.7–5.3)
PROT SERPL-MCNC: 7.8 G/DL (ref 6.4–8.3)
PROT UR STRIP-MCNC: ABNORMAL MG/DL
RBC # BLD AUTO: 5.3 M/UL (ref 3.95–5.11)
RBC #/AREA URNS HPF: ABNORMAL /HPF (ref 0–4)
SODIUM SERPL-SCNC: 134 MMOL/L (ref 135–144)
SP GR UR STRIP: 1.04 (ref 1–1.03)
UROBILINOGEN UR STRIP-ACNC: NORMAL EU/DL (ref 0–1)
WBC #/AREA URNS HPF: ABNORMAL /HPF (ref 0–5)
WBC OTHER # BLD: 8 K/UL (ref 3.5–11.3)

## 2024-02-08 PROCEDURE — 81001 URINALYSIS AUTO W/SCOPE: CPT

## 2024-02-08 PROCEDURE — 86403 PARTICLE AGGLUT ANTBDY SCRN: CPT

## 2024-02-08 PROCEDURE — 96374 THER/PROPH/DIAG INJ IV PUSH: CPT

## 2024-02-08 PROCEDURE — 84703 CHORIONIC GONADOTROPIN ASSAY: CPT

## 2024-02-08 PROCEDURE — 83690 ASSAY OF LIPASE: CPT

## 2024-02-08 PROCEDURE — 96372 THER/PROPH/DIAG INJ SC/IM: CPT

## 2024-02-08 PROCEDURE — 82010 KETONE BODYS QUAN: CPT

## 2024-02-08 PROCEDURE — 2580000003 HC RX 258: Performed by: STUDENT IN AN ORGANIZED HEALTH CARE EDUCATION/TRAINING PROGRAM

## 2024-02-08 PROCEDURE — 87086 URINE CULTURE/COLONY COUNT: CPT

## 2024-02-08 PROCEDURE — 6370000000 HC RX 637 (ALT 250 FOR IP): Performed by: STUDENT IN AN ORGANIZED HEALTH CARE EDUCATION/TRAINING PROGRAM

## 2024-02-08 PROCEDURE — 36415 COLL VENOUS BLD VENIPUNCTURE: CPT

## 2024-02-08 PROCEDURE — 80053 COMPREHEN METABOLIC PANEL: CPT

## 2024-02-08 PROCEDURE — 82947 ASSAY GLUCOSE BLOOD QUANT: CPT

## 2024-02-08 PROCEDURE — 6360000002 HC RX W HCPCS: Performed by: STUDENT IN AN ORGANIZED HEALTH CARE EDUCATION/TRAINING PROGRAM

## 2024-02-08 PROCEDURE — 87186 SC STD MICRODIL/AGAR DIL: CPT

## 2024-02-08 PROCEDURE — 85025 COMPLETE CBC W/AUTO DIFF WBC: CPT

## 2024-02-08 PROCEDURE — 99285 EMERGENCY DEPT VISIT HI MDM: CPT

## 2024-02-08 PROCEDURE — 84132 ASSAY OF SERUM POTASSIUM: CPT

## 2024-02-08 PROCEDURE — 82805 BLOOD GASES W/O2 SATURATION: CPT

## 2024-02-08 PROCEDURE — 96375 TX/PRO/DX INJ NEW DRUG ADDON: CPT

## 2024-02-08 PROCEDURE — 93005 ELECTROCARDIOGRAM TRACING: CPT | Performed by: INTERNAL MEDICINE

## 2024-02-08 RX ORDER — CEPHALEXIN 500 MG/1
500 CAPSULE ORAL ONCE
Status: COMPLETED | OUTPATIENT
Start: 2024-02-09 | End: 2024-02-09

## 2024-02-08 RX ORDER — INSULIN LISPRO 100 [IU]/ML
10 INJECTION, SOLUTION INTRAVENOUS; SUBCUTANEOUS ONCE
Status: COMPLETED | OUTPATIENT
Start: 2024-02-08 | End: 2024-02-08

## 2024-02-08 RX ORDER — 0.9 % SODIUM CHLORIDE 0.9 %
1000 INTRAVENOUS SOLUTION INTRAVENOUS ONCE
Status: COMPLETED | OUTPATIENT
Start: 2024-02-08 | End: 2024-02-08

## 2024-02-08 RX ORDER — ONDANSETRON 2 MG/ML
4 INJECTION INTRAMUSCULAR; INTRAVENOUS ONCE
Status: COMPLETED | OUTPATIENT
Start: 2024-02-08 | End: 2024-02-08

## 2024-02-08 RX ADMIN — INSULIN LISPRO 10 UNITS: 100 INJECTION, SOLUTION INTRAVENOUS; SUBCUTANEOUS at 21:30

## 2024-02-08 RX ADMIN — SODIUM CHLORIDE 1000 ML: 9 INJECTION, SOLUTION INTRAVENOUS at 20:50

## 2024-02-08 RX ADMIN — ONDANSETRON 4 MG: 2 INJECTION INTRAMUSCULAR; INTRAVENOUS at 20:50

## 2024-02-09 PROBLEM — K85.90 ACUTE PANCREATITIS WITHOUT INFECTION OR NECROSIS: Status: ACTIVE | Noted: 2024-02-09

## 2024-02-09 PROBLEM — F33.9 RECURRENT MAJOR DEPRESSIVE DISORDER (HCC): Status: ACTIVE | Noted: 2024-02-09

## 2024-02-09 PROBLEM — N30.00 ACUTE CYSTITIS: Status: ACTIVE | Noted: 2024-02-09

## 2024-02-09 PROBLEM — E11.65 TYPE 2 DIABETES MELLITUS WITH HYPERGLYCEMIA (HCC): Status: ACTIVE | Noted: 2024-02-09

## 2024-02-09 PROBLEM — Z86.711 HISTORY OF PULMONARY EMBOLISM: Status: ACTIVE | Noted: 2024-02-09

## 2024-02-09 PROBLEM — N18.9 CKD (CHRONIC KIDNEY DISEASE): Status: ACTIVE | Noted: 2024-02-09

## 2024-02-09 PROBLEM — R73.9 HYPERGLYCEMIA: Status: ACTIVE | Noted: 2024-02-09

## 2024-02-09 PROBLEM — D13.6: Status: ACTIVE | Noted: 2024-02-09

## 2024-02-09 LAB
ANION GAP SERPL CALCULATED.3IONS-SCNC: 12 MMOL/L (ref 9–17)
ANION GAP SERPL CALCULATED.3IONS-SCNC: 12 MMOL/L (ref 9–17)
B-OH-BUTYR SERPL-MCNC: 3.1 MMOL/L (ref 0.02–0.27)
BUN SERPL-MCNC: 6 MG/DL (ref 6–20)
BUN SERPL-MCNC: 9 MG/DL (ref 6–20)
CALCIUM SERPL-MCNC: 8.7 MG/DL (ref 8.6–10.4)
CALCIUM SERPL-MCNC: 8.9 MG/DL (ref 8.6–10.4)
CHLORIDE SERPL-SCNC: 105 MMOL/L (ref 98–107)
CHLORIDE SERPL-SCNC: 107 MMOL/L (ref 98–107)
CO2 SERPL-SCNC: 21 MMOL/L (ref 20–31)
CO2 SERPL-SCNC: 22 MMOL/L (ref 20–31)
CREAT SERPL-MCNC: 0.7 MG/DL (ref 0.5–0.9)
CREAT SERPL-MCNC: 0.7 MG/DL (ref 0.5–0.9)
GFR SERPL CREATININE-BSD FRML MDRD: >60 ML/MIN/1.73M2
GFR SERPL CREATININE-BSD FRML MDRD: >60 ML/MIN/1.73M2
GLUCOSE BLD-MCNC: 127 MG/DL (ref 65–105)
GLUCOSE BLD-MCNC: 201 MG/DL (ref 65–105)
GLUCOSE BLD-MCNC: 247 MG/DL (ref 65–105)
GLUCOSE BLD-MCNC: 250 MG/DL (ref 65–105)
GLUCOSE BLD-MCNC: 256 MG/DL (ref 65–105)
GLUCOSE SERPL-MCNC: 116 MG/DL (ref 70–99)
GLUCOSE SERPL-MCNC: 96 MG/DL (ref 70–99)
INR PPP: 1
MAGNESIUM SERPL-MCNC: 2 MG/DL (ref 1.6–2.6)
PARTIAL THROMBOPLASTIN TIME: 23.8 SEC (ref 23–36.5)
PHOSPHATE SERPL-MCNC: 2.7 MG/DL (ref 2.6–4.5)
POTASSIUM SERPL-SCNC: 3.2 MMOL/L (ref 3.7–5.3)
POTASSIUM SERPL-SCNC: 4.1 MMOL/L (ref 3.7–5.3)
PROTHROMBIN TIME: 13.3 SEC (ref 11.7–14.9)
SODIUM SERPL-SCNC: 138 MMOL/L (ref 135–144)
SODIUM SERPL-SCNC: 141 MMOL/L (ref 135–144)

## 2024-02-09 PROCEDURE — 6370000000 HC RX 637 (ALT 250 FOR IP): Performed by: STUDENT IN AN ORGANIZED HEALTH CARE EDUCATION/TRAINING PROGRAM

## 2024-02-09 PROCEDURE — 6360000002 HC RX W HCPCS: Performed by: STUDENT IN AN ORGANIZED HEALTH CARE EDUCATION/TRAINING PROGRAM

## 2024-02-09 PROCEDURE — 6370000000 HC RX 637 (ALT 250 FOR IP): Performed by: NURSE PRACTITIONER

## 2024-02-09 PROCEDURE — 83735 ASSAY OF MAGNESIUM: CPT

## 2024-02-09 PROCEDURE — 80048 BASIC METABOLIC PNL TOTAL CA: CPT

## 2024-02-09 PROCEDURE — 6370000000 HC RX 637 (ALT 250 FOR IP): Performed by: PHYSICIAN ASSISTANT

## 2024-02-09 PROCEDURE — 85730 THROMBOPLASTIN TIME PARTIAL: CPT

## 2024-02-09 PROCEDURE — 6360000002 HC RX W HCPCS

## 2024-02-09 PROCEDURE — G0108 DIAB MANAGE TRN  PER INDIV: HCPCS

## 2024-02-09 PROCEDURE — 2580000003 HC RX 258: Performed by: PHYSICIAN ASSISTANT

## 2024-02-09 PROCEDURE — 2060000000 HC ICU INTERMEDIATE R&B

## 2024-02-09 PROCEDURE — 99222 1ST HOSP IP/OBS MODERATE 55: CPT | Performed by: PHYSICIAN ASSISTANT

## 2024-02-09 PROCEDURE — A4216 STERILE WATER/SALINE, 10 ML: HCPCS

## 2024-02-09 PROCEDURE — 94761 N-INVAS EAR/PLS OXIMETRY MLT: CPT

## 2024-02-09 PROCEDURE — 82947 ASSAY GLUCOSE BLOOD QUANT: CPT

## 2024-02-09 PROCEDURE — C9113 INJ PANTOPRAZOLE SODIUM, VIA: HCPCS

## 2024-02-09 PROCEDURE — 84100 ASSAY OF PHOSPHORUS: CPT

## 2024-02-09 PROCEDURE — 99222 1ST HOSP IP/OBS MODERATE 55: CPT | Performed by: INTERNAL MEDICINE

## 2024-02-09 PROCEDURE — 36415 COLL VENOUS BLD VENIPUNCTURE: CPT

## 2024-02-09 PROCEDURE — 2580000003 HC RX 258: Performed by: STUDENT IN AN ORGANIZED HEALTH CARE EDUCATION/TRAINING PROGRAM

## 2024-02-09 PROCEDURE — 6360000002 HC RX W HCPCS: Performed by: NURSE PRACTITIONER

## 2024-02-09 PROCEDURE — 2580000003 HC RX 258

## 2024-02-09 PROCEDURE — 85610 PROTHROMBIN TIME: CPT

## 2024-02-09 RX ORDER — POTASSIUM CHLORIDE 20 MEQ/1
40 TABLET, EXTENDED RELEASE ORAL PRN
Status: DISCONTINUED | OUTPATIENT
Start: 2024-02-09 | End: 2024-02-14 | Stop reason: HOSPADM

## 2024-02-09 RX ORDER — 0.9 % SODIUM CHLORIDE 0.9 %
1000 INTRAVENOUS SOLUTION INTRAVENOUS ONCE
Status: COMPLETED | OUTPATIENT
Start: 2024-02-09 | End: 2024-02-09

## 2024-02-09 RX ORDER — MAGNESIUM SULFATE IN WATER 40 MG/ML
2000 INJECTION, SOLUTION INTRAVENOUS PRN
Status: DISCONTINUED | OUTPATIENT
Start: 2024-02-09 | End: 2024-02-14 | Stop reason: HOSPADM

## 2024-02-09 RX ORDER — DEXTROSE AND SODIUM CHLORIDE 5; .45 G/100ML; G/100ML
INJECTION, SOLUTION INTRAVENOUS CONTINUOUS PRN
Status: DISCONTINUED | OUTPATIENT
Start: 2024-02-09 | End: 2024-02-09

## 2024-02-09 RX ORDER — MAGNESIUM SULFATE 1 G/100ML
1000 INJECTION INTRAVENOUS PRN
Status: DISCONTINUED | OUTPATIENT
Start: 2024-02-09 | End: 2024-02-14 | Stop reason: HOSPADM

## 2024-02-09 RX ORDER — CEFTRIAXONE 1 G/1
1000 INJECTION, POWDER, FOR SOLUTION INTRAMUSCULAR; INTRAVENOUS EVERY 24 HOURS
Status: DISCONTINUED | OUTPATIENT
Start: 2024-02-09 | End: 2024-02-09

## 2024-02-09 RX ORDER — POTASSIUM CHLORIDE 7.45 MG/ML
10 INJECTION INTRAVENOUS PRN
Status: DISCONTINUED | OUTPATIENT
Start: 2024-02-09 | End: 2024-02-14 | Stop reason: HOSPADM

## 2024-02-09 RX ORDER — ENOXAPARIN SODIUM 100 MG/ML
30 INJECTION SUBCUTANEOUS 2 TIMES DAILY
Status: DISCONTINUED | OUTPATIENT
Start: 2024-02-09 | End: 2024-02-09

## 2024-02-09 RX ORDER — POTASSIUM CHLORIDE 7.45 MG/ML
10 INJECTION INTRAVENOUS PRN
Status: DISCONTINUED | OUTPATIENT
Start: 2024-02-09 | End: 2024-02-09

## 2024-02-09 RX ORDER — POLYETHYLENE GLYCOL 3350 17 G/17G
17 POWDER, FOR SOLUTION ORAL DAILY PRN
Status: DISCONTINUED | OUTPATIENT
Start: 2024-02-09 | End: 2024-02-14 | Stop reason: HOSPADM

## 2024-02-09 RX ORDER — 0.9 % SODIUM CHLORIDE 0.9 %
15 INTRAVENOUS SOLUTION INTRAVENOUS ONCE
Status: DISCONTINUED | OUTPATIENT
Start: 2024-02-09 | End: 2024-02-09

## 2024-02-09 RX ORDER — INSULIN LISPRO 100 [IU]/ML
0-4 INJECTION, SOLUTION INTRAVENOUS; SUBCUTANEOUS NIGHTLY
Status: DISCONTINUED | OUTPATIENT
Start: 2024-02-09 | End: 2024-02-10

## 2024-02-09 RX ORDER — INSULIN LISPRO 100 [IU]/ML
0-8 INJECTION, SOLUTION INTRAVENOUS; SUBCUTANEOUS
Status: DISCONTINUED | OUTPATIENT
Start: 2024-02-09 | End: 2024-02-10

## 2024-02-09 RX ORDER — INSULIN LISPRO 100 [IU]/ML
0-16 INJECTION, SOLUTION INTRAVENOUS; SUBCUTANEOUS EVERY 4 HOURS
Status: DISCONTINUED | OUTPATIENT
Start: 2024-02-09 | End: 2024-02-09

## 2024-02-09 RX ORDER — SODIUM CHLORIDE 9 MG/ML
INJECTION, SOLUTION INTRAVENOUS CONTINUOUS
Status: DISCONTINUED | OUTPATIENT
Start: 2024-02-09 | End: 2024-02-14 | Stop reason: HOSPADM

## 2024-02-09 RX ORDER — INSULIN GLARGINE 100 [IU]/ML
15 INJECTION, SOLUTION SUBCUTANEOUS NIGHTLY
Status: DISCONTINUED | OUTPATIENT
Start: 2024-02-09 | End: 2024-02-10

## 2024-02-09 RX ORDER — SODIUM CHLORIDE 450 MG/100ML
INJECTION, SOLUTION INTRAVENOUS CONTINUOUS
Status: DISCONTINUED | OUTPATIENT
Start: 2024-02-09 | End: 2024-02-09

## 2024-02-09 RX ORDER — METOCLOPRAMIDE HYDROCHLORIDE 5 MG/ML
10 INJECTION INTRAMUSCULAR; INTRAVENOUS ONCE
Status: COMPLETED | OUTPATIENT
Start: 2024-02-09 | End: 2024-02-09

## 2024-02-09 RX ADMIN — ENOXAPARIN SODIUM 30 MG: 100 INJECTION SUBCUTANEOUS at 08:43

## 2024-02-09 RX ADMIN — INSULIN LISPRO 8 UNITS: 100 INJECTION, SOLUTION INTRAVENOUS; SUBCUTANEOUS at 13:25

## 2024-02-09 RX ADMIN — METOCLOPRAMIDE 10 MG: 5 INJECTION, SOLUTION INTRAMUSCULAR; INTRAVENOUS at 01:10

## 2024-02-09 RX ADMIN — SODIUM CHLORIDE: 9 INJECTION, SOLUTION INTRAVENOUS at 11:37

## 2024-02-09 RX ADMIN — POTASSIUM CHLORIDE 40 MEQ: 1500 TABLET, EXTENDED RELEASE ORAL at 07:43

## 2024-02-09 RX ADMIN — PANTOPRAZOLE SODIUM 40 MG: 40 INJECTION, POWDER, FOR SOLUTION INTRAVENOUS at 11:37

## 2024-02-09 RX ADMIN — CEPHALEXIN 500 MG: 500 CAPSULE ORAL at 00:10

## 2024-02-09 RX ADMIN — INSULIN GLARGINE 15 UNITS: 100 INJECTION, SOLUTION SUBCUTANEOUS at 21:42

## 2024-02-09 RX ADMIN — SODIUM CHLORIDE 1000 ML: 9 INJECTION, SOLUTION INTRAVENOUS at 01:10

## 2024-02-09 RX ADMIN — Medication 1000 MG: at 06:48

## 2024-02-09 NOTE — CONSULTS
[facility]  Consultation Note     .  Chief Complaint: Nausea, vomiting, abdominal      Reason for consult: Acute on chronic pancreatitis with pancreatic head mass      History of present illness: This is a 33 y.o. 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  Patient stated that she is feeling nauseous and had multiple episodes of bilious nonbloody vomiting since 2 days.  Patient states that she has been having 3-4 out of 10 cramping and burning abdominal pain in the epigastric region for the last 2 days which which remained the same.  No aggravating or relieving factors.  Patient has history of multiple episodes of acute pancreatitis in the past.      Patient was recently admitted for DKA and got discharged on 12/24/2023.  At that time GI was consulted for upper GI bleed with hematemesis.  GI bleed resolved, patient underwent imaging for chronic pancreatitis and MRCP was done, which showed cystic mass in the pancreatic head.  Patient was advised to have outpatient follow-up and further evaluation including EUS.    Patient denied any fever, constipation, diarrhea, dysuria, weight loss, lightheadedness.  In the ED, initial evaluation showed glucose of 380, anion gap of 18, bicarbonate 18, beta hydroxybutyrate 3.10, urinalysis suggestive of UTI and hematuria, VBG showing metabolic acidosis, lipase of 360.  Patient was found to be in DKA and acute pancreatitis.  LFT showed alk phos 127, otherwise unremarkable.  CBC showed WBC within normal limits and hemoconcentration with hemoglobin 15.7 and

## 2024-02-09 NOTE — ED NOTES
Admitting team NP contacted about insulin drip d/t patient's anion gap being closed and recent blood sugar on BMP was 96.   
Blood gluc 256  
ED to inpatient nurses report      Chief Complaint:  Chief Complaint   Patient presents with    Hyperglycemia    Shaking     Present to ED from: Home    MOA:     LOC: alert and orientated to name, place, date  Mobility: Independent  Oxygen Baseline: Room air    Current needs required: Room air   Pending ED orders: Admit  Present condition: Stable    Why did the patient come to the ED?  presents with hyperglycemia, nausea, emesis and difficulty tolerating PO intake at home. She has a history of type 2 DM and states she takes trulicity, lantus and humalog at home. She has a history of memory difficulty and uses her phone to remind her to take her medications, she does not think she has missed any doses. She reports her blood sugar at home has been 300-400. She states she is dehydrated, nauseous with several episodes of nonbloody emesis, increased frequency of urination and difficulty tolerating PO intake. She denies CP, SOB, fevers, abdominal pain, diarrhea, dysuria, flank pain, vaginal bleeding, lightheadedness, dizziness, changes in vision.   What is the plan? Admit for pancreatitis, cystitis with hematuria, hyperglycemia, borderline DKA that resolved as of most recent BMP. Anion gap now closed and BG 96.   Any procedures or intervention occur? Labs, IV fluids, 10 units insulin subcutaneous, Keflex.   Any safety concerns??    Mental Status:  Level of Consciousness: Alert (0)    Psych Assessment:   Psychosocial  Psychosocial (WDL): Within Defined Limits  Vital signs   Vitals:    02/09/24 0331 02/09/24 0400 02/09/24 0500 02/09/24 0600   BP: 118/72 100/62 108/71 122/71   Pulse:       Resp:       Temp:       TempSrc:       SpO2: 100% 98% 100% 99%   Weight:            Vitals:  Patient Vitals for the past 24 hrs:   BP Temp Temp src Pulse Resp SpO2 Weight   02/09/24 0600 122/71 -- -- -- -- 99 % --   02/09/24 0500 108/71 -- -- -- -- 100 % --   02/09/24 0400 100/62 -- -- -- -- 98 % --   02/09/24 0331 118/72 -- -- -- -- 100 % 
Marisol PACHECO at bedside attempting ultrasound IV access.   
POC blood sugar 361  
Pain 3/10, pt not requesting pain meds at this time   
Patient to bathroom independently. Gait even and steady.   
Provider notified for low potasium, no prn orders at this time. Awaiting orders  
Pt given water for PO challenge  
Pt stating she vomited after taking the keflex. Dr. Majano notified  
Report given to Linda PACHECO, all questions answered  
Report given to Sonny PACHECO. All questions answered.   
Report received from Saskia PACHECO. All questions addressed.   
Report received from Sondra PACHECO, all questions answered  
Report was given to Slime PACHECO  
The following labs were labeled with appropriate pt sticker and tubed to lab:     [] Blue     [] Lavender   [] on ice  [x] Green/yellow  [] Green/black [] on ice  [] Grey  [] on ice  [] Yellow  [] Red  [] Pink  [] Type/ Screen  [] ABG  [] VBG    [] COVID-19 swab    [] Rapid  [] PCR  [] Flu swab  [] Peds Viral Panel     [] Urine Sample  [] Fecal Sample  [] Pelvic Cultures  [] Blood Cultures  [] X 2  [] STREP Cultures  [] Wound Cultures   
The following labs were labeled with appropriate pt sticker and tubed to lab:     [] Blue     [] Lavender   [] on ice  [x] Green/yellow  [x] Green/black [] on ice  [] Grey  [] on ice  [] Yellow  [] Red  [] Pink  [] Type/ Screen  [] ABG  [] VBG    [] COVID-19 swab    [] Rapid  [] PCR  [] Flu swab  [] Peds Viral Panel     [] Urine Sample  [] Fecal Sample  [] Pelvic Cultures  [] Blood Cultures  [] X 2  [] STREP Cultures  [] Wound Cultures    
The following labs were labeled with appropriate pt sticker and tubed to lab:     [x] Blue     [] Lavender   [] on ice  [] Green/yellow  [] Green/black [] on ice  [] Grey  [] on ice  [] Yellow  [] Red  [] Pink  [] Type/ Screen  [] ABG  [] VBG    [] COVID-19 swab    [] Rapid  [] PCR  [] Flu swab  [] Peds Viral Panel     [] Urine Sample  [] Fecal Sample  [] Pelvic Cultures  [] Blood Cultures  [] X 2  [] STREP Cultures   
The following labs were labeled with patient stickers & tubed to lab;    [x]Lavender   []On Ice  []Blue  [x]Green/ Yellow  [x]Green/ Black []On Ice  []Pink  []Red  []Yellow    []COVID-19 Swab []Rapid    []Urine Sample  []Pelvic Cultures    []Blood Cultures    
Year: Never true     Ran Out of Food in the Last Year: Never true   Transportation Needs: No Transportation Needs (12/21/2023)    PRAPARE - Transportation     Lack of Transportation (Medical): No     Lack of Transportation (Non-Medical): No   Housing Stability: High Risk (12/21/2023)    Housing Stability Vital Sign     Unable to Pay for Housing in the Last Year: No     Number of Places Lived in the Last Year: 3     Unstable Housing in the Last Year: No       FAMILY HISTORY       Family History   Problem Relation Age of Onset    Rheum Arthritis Mother     Diabetes Father     Cancer Sister         breast    Heart Disease Brother     Other Brother         blood clots       ALLERGIES     Patient has no known allergies.    CURRENT MEDICATIONS       Previous Medications    APIXABAN (ELIQUIS) 5 MG TABS TABLET    Take 1 tablet by mouth 2 times daily    BLOOD GLUCOSE MONITOR STRIPS    Test 3 times a day & as needed for symptoms of irregular blood glucose. Dispense sufficient amount for indicated testing frequency plus additional to accommodate PRN testing needs.    GLUCOSE MONITORING KIT    1 kit by Does not apply route daily    INSULIN ASPART (NOVOLOG FLEXPEN) 100 UNIT/ML INJECTION PEN        INSULIN GLARGINE (BASAGLAR KWIKPEN) 100 UNIT/ML INJECTION PEN    Inject 30 Units into the skin nightly    INSULIN LISPRO, 1 UNIT DIAL, (HUMALOG KWIKPEN) 100 UNIT/ML SOPN    Inject 2 Units into the skin 3 times daily (before meals)    INSULIN PEN NEEDLE 32G X 4 MM MISC    1 each by Does not apply route daily    LANCETS MISC    1 each by Does not apply route daily    OLANZAPINE (ZYPREXA) 10 MG TABLET    Take 1 tablet by mouth nightly    PRENATAL VIT-FE FUM-FA-OMEGA (PRENATAL MULTI +DHA) 27-0.8-228 MG CAPS        VENLAFAXINE (EFFEXOR XR) 150 MG EXTENDED RELEASE CAPSULE    Take 1 capsule by mouth daily TAKES WITH 75 MG FOR TOTAL DAILY DOSE  MG    VENLAFAXINE (EFFEXOR XR) 75 MG EXTENDED RELEASE CAPSULE    Take 1 capsule by mouth daily

## 2024-02-09 NOTE — H&P
[facility]  Office: [phone]  Crow Wallace DO, Maximiliano Sabillon DO, Albino Cadena DO, Amol Solano DO, Erlin Devries MD, Lor Fontana MD, Tiffanie Campos MD, Sherry Al MD,  Miguelito Theodore MD, Karina Blanco MD, Krupa Sanchez MD,  Kiesha Kaur DO, Paxton Rivas MD, Sriram Portillo MD, Agustín Wallace DO, Kourtney Malik MD,  Jose Guadalupe Moralez DO, Amber Sanchez MD, Diana Ballesteros MD, Ruthie Lacey MD, El Solares MD,  Portillo Cabral MD, Myra Montoya MD, Farzana Blackburn MD, Aron Cabrera MD, Lj Morales MD, Lidia Reid MD, Rock Mahajan DO, Ed Hayden DO, France Chen MD,  Elmer Dunalp MD, Shirley Waterhouse, CNP,  Toya Redman CNP, Andrea Hugo, CNP,  April Espinal, DNP, Donna Lizama, CNP, Amaya Vaca, CNP, Kira Guillen CNP, Baylee Wilson, CNP, Sugar Bills, CNP, Kaia Finley PASusanC, WENDIE BullockC, Jyotsna Lal, CNP, Yana Viveros, CNS, Mago Mai, CNP, Anastasia Rudolph, CNP, Tracy Schwab, CNP         [facility]   IN-PATIENT SERVICE   [facility]    HISTORY AND PHYSICAL EXAMINATION            Date:   2/9/2024  Patient name:  Ruth Martínez  Date of admission:  2/8/2024  7:20 PM  MRN:   [id number]  Account:  [id number]  YOB: 1990  PCP:    Indira Chavarria PA-C  Room:   17/17  Code Status:    Full Code    Chief Complaint:     Chief Complaint   Patient presents with    Hyperglycemia    Shaking       History Obtained From:     patient    History of Present Illness:     Ruth Martínez is a 33 y.o. Non- / non  female who presents with Hyperglycemia and is admitted to the hospital for the management of DKA, type 2, not at goal (HCC). Patient has a history significant for diabetes mellitus, MDD, CKD, and hx PE on Eliquis.    Patient reports she has had diffuse abdominal pain and non bloody vomiting for the last 3 days.  She also reports some chest tightness without shortness of breath.

## 2024-02-09 NOTE — ED PROVIDER NOTES
[facility] ED     Emergency Department     Faculty Attestation        I performed a history and physical examination of the patient and discussed management with the resident. I reviewed the resident’s note and agree with the documented findings and plan of care. Any areas of disagreement are noted on the chart. I was personally present for the key portions of any procedures. I have documented in the chart those procedures where I was not present during the key portions. I have reviewed the emergency nurses triage note. I agree with the chief complaint, past medical history, past surgical history, allergies, medications, social and family history as documented unless otherwise noted below.    For mid-level providers such as nurse practitioners as well as physicians assistants:    I have personally seen and evaluated the patient.    I find the patient's history and physical exam are consistent with NP/PA documentation.  I agree with the care provided, treatment rendered, disposition, & follow-up plan.     Additional findings are as noted.    Vital Signs: BP (!) 143/104   Pulse 68   Temp 98 °F (36.7 °C) (Oral)   Resp 18   Wt 104.3 kg (230 lb)   SpO2 92%   BMI 36.02 kg/m²   PCP:  Indira Chavarria, PASusanC    Pertinent Comments:     Patient complains of hyperglycemia recently diagnosed diabetes and is on insulin she states her sugars been high in the 300s but then will drop down to the low 100s she complains of some nausea but no vomiting.  She has no abdominal pain she is awake alert and oriented with a GCS of 15      Critical Care  None          [facility] MD Umesh    Attending Emergency Medicine Physician            Zak Calvo MD  02/08/24 2018       Zak Calvo MD  02/08/24 2117    
        [facility] ED  Emergency Department  Emergency Medicine Resident Turn-Over     Note Started: 1:02 AM EST    Care of Ruth Martínez was assumed from Dr. Majano and is being seen for Hyperglycemia and Shaking  .  The patient's initial evaluation and plan have been discussed with the prior provider who initially evaluated the patient.     EMERGENCY DEPARTMENT COURSE / MEDICAL DECISION MAKING:       MEDICATIONS GIVEN:  Orders Placed This Encounter   Medications    ondansetron (ZOFRAN) injection 4 mg    sodium chloride 0.9 % bolus 1,000 mL    DISCONTD: insulin regular (HUMULIN R;NOVOLIN R) injection 10 Units    insulin lispro (HUMALOG) injection vial 10 Units    cephALEXin (KEFLEX) capsule 500 mg     Order Specific Question:   Antimicrobial Indications     Answer:   Urinary Tract Infection    metoclopramide (REGLAN) injection 10 mg    sodium chloride 0.9 % bolus 1,000 mL       LABS / RADIOLOGY:     Labs Reviewed   CBC WITH AUTO DIFFERENTIAL - Abnormal; Notable for the following components:       Result Value    RBC 5.30 (*)     Hemoglobin 15.7 (*)     Hematocrit 49.2 (*)     All other components within normal limits   COMPREHENSIVE METABOLIC PANEL - Abnormal; Notable for the following components:    Sodium 134 (*)     Potassium 5.5 (*)     CO2 18 (*)     Anion Gap 18 (*)     Glucose 380 (*)     Albumin/Globulin Ratio 0.9 (*)     Alkaline Phosphatase 127 (*)     All other components within normal limits   LIPASE - Abnormal; Notable for the following components:    Lipase 360 (*)     All other components within normal limits   BLOOD GAS, VENOUS - Abnormal; Notable for the following components:    pH, Tadeo 7.293 (*)     pO2, Tadeo 51.9 (*)     HCO3, Venous 20.8 (*)     Negative Base Excess, Tadeo 5.3 (*)     All other components within normal limits   URINALYSIS WITH REFLEX TO CULTURE - Abnormal; Notable for the following components:    Turbidity UA Cloudy (*)     Glucose, Ur 3+ (*)     Ketones, Urine 
     [facility]   Emergency Department  Emergency Medicine Attending Sign-out   Note started: 11:36 PM EST    Care of Ruth Martínez was assumed from previous attending Dr. Calvo at 11 PM and is being seen for Hyperglycemia and Shaking  .  The patient's initial evaluation and plan have been discussed with the prior provider who initially evaluated the patient.     Attestation  I was available and discussed any additional care issues that arose and coordinated the management plans with the resident(s) caring for the patient during my duty period. Any areas of disagreement with resident's documentation of care or procedures are noted on the chart. I was personally present for the key portions of any/all procedures, during my duty period. I have documented in the chart those procedures where I was not present during the key portions.     BRIEF PATIENT SUMMARY/MDM COURSE PER INITIAL PROVIDER:   RECENT VITALS:     Temp: 98 °F (36.7 °C),  Pulse: 68, Respirations: 18, BP: (!) 121/92, SpO2: 99 %    This patient is a 33 y.o. Female with diabetic.  Currently on insulin.  Had elevated glucose.  Awaiting reevaluation and recheck of labs    DIAGNOSTICS/MEDICATIONS:     MEDICATIONS GIVEN:  ED Medication Orders (From admission, onward)      Start Ordered     Status Ordering Provider    02/08/24 2130 02/08/24 2120  insulin lispro (HUMALOG) injection vial 10 Units  ONCE         Last MAR action: Given - by MANNY ROBERTS on 02/08/24 at 2130 [facility]    02/08/24 2045 02/08/24 2035  ondansetron (ZOFRAN) injection 4 mg  ONCE         Last MAR action: Given - by MANNY ROBERTS on 02/08/24 at 2050 [facility]    02/08/24 2045 02/08/24 2035  sodium chloride 0.9 % bolus 1,000 mL  ONCE         Last MAR action: Stopped - by MANNY ROBERTS on 02/08/24 at 2150 [facility]            LABS    Labs Reviewed   CBC WITH AUTO DIFFERENTIAL - Abnormal; Notable for the following components:       Result Value    RBC 
testing frequency plus additional to accommodate PRN testing needs. 12/24/23   Garland Ruth MD   apixaban (ELIQUIS) 5 MG TABS tablet Take 1 tablet by mouth 2 times daily    Mouna Mccartney MD   insulin aspart (NOVOLOG FLEXPEN) 100 UNIT/ML injection pen     Mouna Mccartney MD   OLANZapine (ZYPREXA) 10 MG tablet Take 1 tablet by mouth nightly    Mouna Mccartney MD   venlafaxine (EFFEXOR XR) 150 MG extended release capsule Take 1 capsule by mouth daily TAKES WITH 75 MG FOR TOTAL DAILY DOSE  MG    Mouna Mccartney MD   venlafaxine (EFFEXOR XR) 75 MG extended release capsule Take 1 capsule by mouth daily TAKES WITH 150 MG FOR TOTAL DAILY DOSE  MG    Mouna Mccartney MD   Prenatal Vit-Fe Fum-FA-Omega (PRENATAL MULTI +DHA) 27-0.8-228 MG CAPS  4/21/16   Mouna Mccartney MD       REVIEW OF SYSTEMS       Review of Systems   Constitutional:  Positive for appetite change. Negative for chills and fever.   HENT: Negative.     Respiratory:  Negative for cough and shortness of breath.    Cardiovascular:  Negative for chest pain.   Gastrointestinal:  Positive for nausea and vomiting. Negative for abdominal pain, blood in stool and diarrhea.   Genitourinary:  Positive for frequency. Negative for dysuria and hematuria.   Musculoskeletal:  Negative for arthralgias and myalgias.   Neurological:  Negative for dizziness, light-headedness and headaches.       PHYSICAL EXAM      INITIAL VITALS:   BP (!) 121/92   Pulse 68   Temp 98 °F (36.7 °C) (Oral)   Resp 18   Wt 104.3 kg (230 lb)   SpO2 99%   BMI 36.02 kg/m²     Physical Exam  Vitals reviewed.   Constitutional:       Appearance: She is not toxic-appearing.   HENT:      Mouth/Throat:      Pharynx: Oropharynx is clear.   Eyes:      Extraocular Movements: Extraocular movements intact.      Pupils: Pupils are equal, round, and reactive to light.   Cardiovascular:      Rate and Rhythm: Normal rate and regular rhythm.   Pulmonary:

## 2024-02-10 PROBLEM — K85.11 ACUTE BILIARY PANCREATITIS WITH UNINFECTED NECROSIS: Status: ACTIVE | Noted: 2024-02-09

## 2024-02-10 LAB
ALBUMIN SERPL-MCNC: 3.3 G/DL (ref 3.5–5.2)
ALBUMIN/GLOB SERPL: 1.2 {RATIO} (ref 1–2.5)
ALP SERPL-CCNC: 105 U/L (ref 35–104)
ALT SERPL-CCNC: <5 U/L (ref 5–33)
ANION GAP SERPL CALCULATED.3IONS-SCNC: 10 MMOL/L (ref 9–17)
ANION GAP SERPL CALCULATED.3IONS-SCNC: 11 MMOL/L (ref 9–17)
AST SERPL-CCNC: 9 U/L
BASOPHILS # BLD: 0.05 K/UL (ref 0–0.2)
BASOPHILS NFR BLD: 1 % (ref 0–2)
BILIRUB DIRECT SERPL-MCNC: <0.1 MG/DL
BILIRUB INDIRECT SERPL-MCNC: ABNORMAL MG/DL (ref 0–1)
BILIRUB SERPL-MCNC: 0.2 MG/DL (ref 0.3–1.2)
BUN SERPL-MCNC: 7 MG/DL (ref 6–20)
BUN SERPL-MCNC: 8 MG/DL (ref 6–20)
CALCIUM SERPL-MCNC: 8.8 MG/DL (ref 8.6–10.4)
CALCIUM SERPL-MCNC: 9 MG/DL (ref 8.6–10.4)
CANCER AG19-9 SERPL IA-ACNC: 69 U/ML (ref 0–35)
CEA SERPL-MCNC: 2.5 NG/ML
CHLORIDE SERPL-SCNC: 104 MMOL/L (ref 98–107)
CHLORIDE SERPL-SCNC: 105 MMOL/L (ref 98–107)
CO2 SERPL-SCNC: 19 MMOL/L (ref 20–31)
CO2 SERPL-SCNC: 19 MMOL/L (ref 20–31)
CREAT SERPL-MCNC: 0.7 MG/DL (ref 0.5–0.9)
CREAT SERPL-MCNC: 0.7 MG/DL (ref 0.5–0.9)
EKG ATRIAL RATE: 102 BPM
EKG P AXIS: 16 DEGREES
EKG P-R INTERVAL: 150 MS
EKG Q-T INTERVAL: 356 MS
EKG QRS DURATION: 66 MS
EKG QTC CALCULATION (BAZETT): 463 MS
EKG R AXIS: -21 DEGREES
EKG T AXIS: 54 DEGREES
EKG VENTRICULAR RATE: 102 BPM
EOSINOPHIL # BLD: 0.14 K/UL (ref 0–0.44)
EOSINOPHILS RELATIVE PERCENT: 2 % (ref 1–4)
ERYTHROCYTE [DISTWIDTH] IN BLOOD BY AUTOMATED COUNT: 12.6 % (ref 11.8–14.4)
EST. AVERAGE GLUCOSE BLD GHB EST-MCNC: 269 MG/DL
GFR SERPL CREATININE-BSD FRML MDRD: >60 ML/MIN/1.73M2
GFR SERPL CREATININE-BSD FRML MDRD: >60 ML/MIN/1.73M2
GLUCOSE BLD-MCNC: 200 MG/DL (ref 65–105)
GLUCOSE BLD-MCNC: 212 MG/DL (ref 65–105)
GLUCOSE BLD-MCNC: 288 MG/DL (ref 65–105)
GLUCOSE BLD-MCNC: 302 MG/DL (ref 65–105)
GLUCOSE BLD-MCNC: 381 MG/DL (ref 65–105)
GLUCOSE BLD-MCNC: 420 MG/DL (ref 65–105)
GLUCOSE BLD-MCNC: 91 MG/DL (ref 65–105)
GLUCOSE SERPL-MCNC: 249 MG/DL (ref 70–99)
GLUCOSE SERPL-MCNC: 435 MG/DL (ref 70–99)
HBA1C MFR BLD: 11 % (ref 4–6)
HCT VFR BLD AUTO: 42.7 % (ref 36.3–47.1)
HGB BLD-MCNC: 13.4 G/DL (ref 11.9–15.1)
IMM GRANULOCYTES # BLD AUTO: <0.03 K/UL (ref 0–0.3)
IMM GRANULOCYTES NFR BLD: 0 %
LIPASE SERPL-CCNC: 204 U/L (ref 13–60)
LIPASE SERPL-CCNC: 265 U/L (ref 13–60)
LYMPHOCYTES NFR BLD: 2.9 K/UL (ref 1.1–3.7)
LYMPHOCYTES RELATIVE PERCENT: 40 % (ref 24–43)
MAGNESIUM SERPL-MCNC: 2 MG/DL (ref 1.6–2.6)
MCH RBC QN AUTO: 29.2 PG (ref 25.2–33.5)
MCHC RBC AUTO-ENTMCNC: 31.4 G/DL (ref 28.4–34.8)
MCV RBC AUTO: 93 FL (ref 82.6–102.9)
MONOCYTES NFR BLD: 0.49 K/UL (ref 0.1–1.2)
MONOCYTES NFR BLD: 7 % (ref 3–12)
NEUTROPHILS NFR BLD: 51 % (ref 36–65)
NEUTS SEG NFR BLD: 3.75 K/UL (ref 1.5–8.1)
NRBC BLD-RTO: 0 PER 100 WBC
PHOSPHATE SERPL-MCNC: 3.3 MG/DL (ref 2.6–4.5)
PLATELET # BLD AUTO: 224 K/UL (ref 138–453)
PMV BLD AUTO: 12.4 FL (ref 8.1–13.5)
POTASSIUM SERPL-SCNC: 3.7 MMOL/L (ref 3.7–5.3)
POTASSIUM SERPL-SCNC: 4.2 MMOL/L (ref 3.7–5.3)
PROT SERPL-MCNC: 6.1 G/DL (ref 6.4–8.3)
RBC # BLD AUTO: 4.59 M/UL (ref 3.95–5.11)
SODIUM SERPL-SCNC: 133 MMOL/L (ref 135–144)
SODIUM SERPL-SCNC: 135 MMOL/L (ref 135–144)
WBC OTHER # BLD: 7.4 K/UL (ref 3.5–11.3)

## 2024-02-10 PROCEDURE — 6370000000 HC RX 637 (ALT 250 FOR IP): Performed by: NURSE PRACTITIONER

## 2024-02-10 PROCEDURE — 82947 ASSAY GLUCOSE BLOOD QUANT: CPT

## 2024-02-10 PROCEDURE — 6370000000 HC RX 637 (ALT 250 FOR IP): Performed by: PHYSICIAN ASSISTANT

## 2024-02-10 PROCEDURE — 6360000002 HC RX W HCPCS: Performed by: PHYSICIAN ASSISTANT

## 2024-02-10 PROCEDURE — 83036 HEMOGLOBIN GLYCOSYLATED A1C: CPT

## 2024-02-10 PROCEDURE — 83690 ASSAY OF LIPASE: CPT

## 2024-02-10 PROCEDURE — 99231 SBSQ HOSP IP/OBS SF/LOW 25: CPT | Performed by: INTERNAL MEDICINE

## 2024-02-10 PROCEDURE — 6360000002 HC RX W HCPCS: Performed by: NURSE PRACTITIONER

## 2024-02-10 PROCEDURE — 36415 COLL VENOUS BLD VENIPUNCTURE: CPT

## 2024-02-10 PROCEDURE — 2060000000 HC ICU INTERMEDIATE R&B

## 2024-02-10 PROCEDURE — 86301 IMMUNOASSAY TUMOR CA 19-9: CPT

## 2024-02-10 PROCEDURE — 80076 HEPATIC FUNCTION PANEL: CPT

## 2024-02-10 PROCEDURE — 2580000003 HC RX 258

## 2024-02-10 PROCEDURE — 83735 ASSAY OF MAGNESIUM: CPT

## 2024-02-10 PROCEDURE — 80048 BASIC METABOLIC PNL TOTAL CA: CPT

## 2024-02-10 PROCEDURE — A4216 STERILE WATER/SALINE, 10 ML: HCPCS

## 2024-02-10 PROCEDURE — 6360000002 HC RX W HCPCS

## 2024-02-10 PROCEDURE — C9113 INJ PANTOPRAZOLE SODIUM, VIA: HCPCS

## 2024-02-10 PROCEDURE — 82378 CARCINOEMBRYONIC ANTIGEN: CPT

## 2024-02-10 PROCEDURE — 85025 COMPLETE CBC W/AUTO DIFF WBC: CPT

## 2024-02-10 PROCEDURE — 84100 ASSAY OF PHOSPHORUS: CPT

## 2024-02-10 PROCEDURE — 99232 SBSQ HOSP IP/OBS MODERATE 35: CPT | Performed by: PHYSICIAN ASSISTANT

## 2024-02-10 PROCEDURE — 2580000003 HC RX 258: Performed by: PHYSICIAN ASSISTANT

## 2024-02-10 RX ORDER — CHOLECALCIFEROL (VITAMIN D3) 125 MCG
10 CAPSULE ORAL NIGHTLY PRN
Status: DISCONTINUED | OUTPATIENT
Start: 2024-02-10 | End: 2024-02-14 | Stop reason: HOSPADM

## 2024-02-10 RX ORDER — INSULIN LISPRO 100 [IU]/ML
0-16 INJECTION, SOLUTION INTRAVENOUS; SUBCUTANEOUS
Status: DISCONTINUED | OUTPATIENT
Start: 2024-02-10 | End: 2024-02-10

## 2024-02-10 RX ORDER — INSULIN GLARGINE 100 [IU]/ML
30 INJECTION, SOLUTION SUBCUTANEOUS NIGHTLY
Status: DISCONTINUED | OUTPATIENT
Start: 2024-02-10 | End: 2024-02-14 | Stop reason: HOSPADM

## 2024-02-10 RX ORDER — INSULIN LISPRO 100 [IU]/ML
0-4 INJECTION, SOLUTION INTRAVENOUS; SUBCUTANEOUS NIGHTLY
Status: DISCONTINUED | OUTPATIENT
Start: 2024-02-10 | End: 2024-02-10

## 2024-02-10 RX ORDER — INSULIN LISPRO 100 [IU]/ML
10 INJECTION, SOLUTION INTRAVENOUS; SUBCUTANEOUS ONCE
Status: COMPLETED | OUTPATIENT
Start: 2024-02-10 | End: 2024-02-10

## 2024-02-10 RX ORDER — MORPHINE SULFATE 2 MG/ML
1 INJECTION, SOLUTION INTRAMUSCULAR; INTRAVENOUS EVERY 4 HOURS PRN
Status: DISCONTINUED | OUTPATIENT
Start: 2024-02-10 | End: 2024-02-11

## 2024-02-10 RX ORDER — MORPHINE SULFATE 2 MG/ML
1 INJECTION, SOLUTION INTRAMUSCULAR; INTRAVENOUS EVERY 4 HOURS PRN
Status: DISCONTINUED | OUTPATIENT
Start: 2024-02-10 | End: 2024-02-10

## 2024-02-10 RX ORDER — INSULIN LISPRO 100 [IU]/ML
0-8 INJECTION, SOLUTION INTRAVENOUS; SUBCUTANEOUS
Status: DISCONTINUED | OUTPATIENT
Start: 2024-02-10 | End: 2024-02-14 | Stop reason: HOSPADM

## 2024-02-10 RX ORDER — INSULIN LISPRO 100 [IU]/ML
0-4 INJECTION, SOLUTION INTRAVENOUS; SUBCUTANEOUS NIGHTLY
Status: DISCONTINUED | OUTPATIENT
Start: 2024-02-10 | End: 2024-02-14 | Stop reason: HOSPADM

## 2024-02-10 RX ORDER — KETOROLAC TROMETHAMINE 15 MG/ML
15 INJECTION, SOLUTION INTRAMUSCULAR; INTRAVENOUS EVERY 6 HOURS PRN
Status: DISCONTINUED | OUTPATIENT
Start: 2024-02-10 | End: 2024-02-10

## 2024-02-10 RX ORDER — INSULIN GLARGINE 100 [IU]/ML
10 INJECTION, SOLUTION SUBCUTANEOUS ONCE
Status: COMPLETED | OUTPATIENT
Start: 2024-02-10 | End: 2024-02-10

## 2024-02-10 RX ADMIN — INSULIN LISPRO 2 UNITS: 100 INJECTION, SOLUTION INTRAVENOUS; SUBCUTANEOUS at 17:48

## 2024-02-10 RX ADMIN — INSULIN LISPRO 4 UNITS: 100 INJECTION, SOLUTION INTRAVENOUS; SUBCUTANEOUS at 08:03

## 2024-02-10 RX ADMIN — Medication 10 MG: at 01:15

## 2024-02-10 RX ADMIN — INSULIN GLARGINE 10 UNITS: 100 INJECTION, SOLUTION SUBCUTANEOUS at 11:04

## 2024-02-10 RX ADMIN — Medication 1000 MG: at 04:41

## 2024-02-10 RX ADMIN — MORPHINE SULFATE 1 MG: 2 INJECTION, SOLUTION INTRAMUSCULAR; INTRAVENOUS at 17:48

## 2024-02-10 RX ADMIN — SODIUM CHLORIDE: 9 INJECTION, SOLUTION INTRAVENOUS at 02:11

## 2024-02-10 RX ADMIN — MORPHINE SULFATE 1 MG: 2 INJECTION, SOLUTION INTRAMUSCULAR; INTRAVENOUS at 22:00

## 2024-02-10 RX ADMIN — INSULIN GLARGINE 30 UNITS: 100 INJECTION, SOLUTION SUBCUTANEOUS at 19:40

## 2024-02-10 RX ADMIN — Medication 10 MG: at 23:23

## 2024-02-10 RX ADMIN — INSULIN LISPRO 10 UNITS: 100 INJECTION, SOLUTION INTRAVENOUS; SUBCUTANEOUS at 05:01

## 2024-02-10 RX ADMIN — PANTOPRAZOLE SODIUM 40 MG: 40 INJECTION, POWDER, FOR SOLUTION INTRAVENOUS at 08:02

## 2024-02-10 NOTE — CARE COORDINATION
Case Management Assessment  Initial Evaluation    Date/Time of Evaluation: 2/10/2024 9:30 AM  Assessment Completed by: Sapna Rollins RN    If patient is discharged prior to next notation, then this note serves as note for discharge by case management.    Patient Name: Ruth Martínez                   YOB: 1990  Diagnosis: Cystitis with hematuria [N30.91]  Hyperglycemia [R73.9]  DKA, type 2, not at goal (HCC) [E11.10]  Acute pancreatitis without infection or necrosis, unspecified pancreatitis type [K85.90]                   Date / Time: 2/8/2024  7:20 PM    Patient Admission Status: Inpatient   Readmission Risk (Low < 19, Mod (19-27), High > 27): Readmission Risk Score: 13.7    Current PCP: Indira Chavarria PA-C  PCP verified by CM? (P) Yes (Indira Chavarria)    Chart Reviewed: Yes      History Provided by: (P) Patient  Patient Orientation: (P) Alert and Oriented, Person, Place, Situation, Self    Patient Cognition: (P) Alert    Hospitalization in the last 30 days (Readmission):  No    If yes, Readmission Assessment in CM Navigator will be completed.    Advance Directives:      Code Status: Full Code   Patient's Primary Decision Maker is: (P) Legal Next of Kin    Primary Decision Maker: Soledad Waters - Brother/Sister - [phone]    Discharge Planning:    Patient lives with: (P) Family Members (lives with sister and patient's 2 children) Type of Home: (P) House  Primary Care Giver: (P) Self  Patient Support Systems include: (P) Children, Family Members   Current Financial resources: (P) Medicare, Medicaid  Current community resources: (P) None  Current services prior to admission: (P) None            Current DME:              Type of Home Care services:  (P) None    ADLS  Prior functional level: (P) Independent in ADLs/IADLs  Current functional level: (P) Independent in ADLs/IADLs    PT AM-PAC:   /24  OT AM-PAC:   /24    Family can provide assistance at DC: (P) Yes  Would you like Case

## 2024-02-11 LAB
ANION GAP SERPL CALCULATED.3IONS-SCNC: 8 MMOL/L (ref 9–17)
BASOPHILS # BLD: 0.03 K/UL (ref 0–0.2)
BASOPHILS NFR BLD: 1 % (ref 0–2)
BUN SERPL-MCNC: 7 MG/DL (ref 6–20)
CALCIUM SERPL-MCNC: 8.5 MG/DL (ref 8.6–10.4)
CHLORIDE SERPL-SCNC: 109 MMOL/L (ref 98–107)
CO2 SERPL-SCNC: 22 MMOL/L (ref 20–31)
CREAT SERPL-MCNC: 0.6 MG/DL (ref 0.5–0.9)
EOSINOPHIL # BLD: 0.1 K/UL (ref 0–0.44)
EOSINOPHILS RELATIVE PERCENT: 2 % (ref 1–4)
ERYTHROCYTE [DISTWIDTH] IN BLOOD BY AUTOMATED COUNT: 12.7 % (ref 11.8–14.4)
GFR SERPL CREATININE-BSD FRML MDRD: >60 ML/MIN/1.73M2
GLUCOSE BLD-MCNC: 175 MG/DL (ref 65–105)
GLUCOSE BLD-MCNC: 183 MG/DL (ref 65–105)
GLUCOSE BLD-MCNC: 197 MG/DL (ref 65–105)
GLUCOSE BLD-MCNC: 229 MG/DL (ref 65–105)
GLUCOSE BLD-MCNC: 265 MG/DL (ref 65–105)
GLUCOSE BLD-MCNC: 338 MG/DL (ref 65–105)
GLUCOSE SERPL-MCNC: 175 MG/DL (ref 70–99)
HCT VFR BLD AUTO: 39.4 % (ref 36.3–47.1)
HGB BLD-MCNC: 12.5 G/DL (ref 11.9–15.1)
IMM GRANULOCYTES # BLD AUTO: <0.03 K/UL (ref 0–0.3)
IMM GRANULOCYTES NFR BLD: 0 %
LIPASE SERPL-CCNC: 159 U/L (ref 13–60)
LYMPHOCYTES NFR BLD: 3.29 K/UL (ref 1.1–3.7)
LYMPHOCYTES RELATIVE PERCENT: 51 % (ref 24–43)
MAGNESIUM SERPL-MCNC: 1.9 MG/DL (ref 1.6–2.6)
MCH RBC QN AUTO: 29.4 PG (ref 25.2–33.5)
MCHC RBC AUTO-ENTMCNC: 31.7 G/DL (ref 28.4–34.8)
MCV RBC AUTO: 92.7 FL (ref 82.6–102.9)
MICROORGANISM SPEC CULT: ABNORMAL
MICROORGANISM SPEC CULT: ABNORMAL
MONOCYTES NFR BLD: 0.43 K/UL (ref 0.1–1.2)
MONOCYTES NFR BLD: 7 % (ref 3–12)
NEUTROPHILS NFR BLD: 39 % (ref 36–65)
NEUTS SEG NFR BLD: 2.43 K/UL (ref 1.5–8.1)
NRBC BLD-RTO: 0 PER 100 WBC
PHOSPHATE SERPL-MCNC: 4.1 MG/DL (ref 2.6–4.5)
PLATELET # BLD AUTO: 208 K/UL (ref 138–453)
PMV BLD AUTO: 12.3 FL (ref 8.1–13.5)
POTASSIUM SERPL-SCNC: 3.3 MMOL/L (ref 3.7–5.3)
RBC # BLD AUTO: 4.25 M/UL (ref 3.95–5.11)
SODIUM SERPL-SCNC: 139 MMOL/L (ref 135–144)
SPECIMEN DESCRIPTION: ABNORMAL
WBC OTHER # BLD: 6.3 K/UL (ref 3.5–11.3)

## 2024-02-11 PROCEDURE — 99232 SBSQ HOSP IP/OBS MODERATE 35: CPT | Performed by: PHYSICIAN ASSISTANT

## 2024-02-11 PROCEDURE — 2060000000 HC ICU INTERMEDIATE R&B

## 2024-02-11 PROCEDURE — 6360000002 HC RX W HCPCS: Performed by: PHYSICIAN ASSISTANT

## 2024-02-11 PROCEDURE — 2580000003 HC RX 258: Performed by: PHYSICIAN ASSISTANT

## 2024-02-11 PROCEDURE — 84100 ASSAY OF PHOSPHORUS: CPT

## 2024-02-11 PROCEDURE — APPNB30 APP NON BILLABLE TIME 0-30 MINS: Performed by: NURSE PRACTITIONER

## 2024-02-11 PROCEDURE — C9113 INJ PANTOPRAZOLE SODIUM, VIA: HCPCS

## 2024-02-11 PROCEDURE — A4216 STERILE WATER/SALINE, 10 ML: HCPCS

## 2024-02-11 PROCEDURE — 6370000000 HC RX 637 (ALT 250 FOR IP): Performed by: PHYSICIAN ASSISTANT

## 2024-02-11 PROCEDURE — 80048 BASIC METABOLIC PNL TOTAL CA: CPT

## 2024-02-11 PROCEDURE — 83690 ASSAY OF LIPASE: CPT

## 2024-02-11 PROCEDURE — 36415 COLL VENOUS BLD VENIPUNCTURE: CPT

## 2024-02-11 PROCEDURE — 6370000000 HC RX 637 (ALT 250 FOR IP): Performed by: NURSE PRACTITIONER

## 2024-02-11 PROCEDURE — 99231 SBSQ HOSP IP/OBS SF/LOW 25: CPT | Performed by: INTERNAL MEDICINE

## 2024-02-11 PROCEDURE — 85025 COMPLETE CBC W/AUTO DIFF WBC: CPT

## 2024-02-11 PROCEDURE — 6370000000 HC RX 637 (ALT 250 FOR IP): Performed by: STUDENT IN AN ORGANIZED HEALTH CARE EDUCATION/TRAINING PROGRAM

## 2024-02-11 PROCEDURE — 6360000002 HC RX W HCPCS: Performed by: NURSE PRACTITIONER

## 2024-02-11 PROCEDURE — 2580000003 HC RX 258

## 2024-02-11 PROCEDURE — 83735 ASSAY OF MAGNESIUM: CPT

## 2024-02-11 PROCEDURE — 6360000002 HC RX W HCPCS

## 2024-02-11 PROCEDURE — 82947 ASSAY GLUCOSE BLOOD QUANT: CPT

## 2024-02-11 RX ORDER — ENOXAPARIN SODIUM 100 MG/ML
30 INJECTION SUBCUTANEOUS 2 TIMES DAILY
Status: COMPLETED | OUTPATIENT
Start: 2024-02-11 | End: 2024-02-11

## 2024-02-11 RX ORDER — ENOXAPARIN SODIUM 100 MG/ML
30 INJECTION SUBCUTANEOUS 2 TIMES DAILY
Status: DISCONTINUED | OUTPATIENT
Start: 2024-02-11 | End: 2024-02-11

## 2024-02-11 RX ORDER — POTASSIUM CHLORIDE 20 MEQ/1
20 TABLET, EXTENDED RELEASE ORAL ONCE
Status: COMPLETED | OUTPATIENT
Start: 2024-02-11 | End: 2024-02-11

## 2024-02-11 RX ORDER — ENOXAPARIN SODIUM 100 MG/ML
30 INJECTION SUBCUTANEOUS 2 TIMES DAILY
Status: DISCONTINUED | OUTPATIENT
Start: 2024-02-12 | End: 2024-02-14 | Stop reason: HOSPADM

## 2024-02-11 RX ORDER — OXYCODONE HYDROCHLORIDE 5 MG/1
5 TABLET ORAL EVERY 6 HOURS PRN
Status: DISCONTINUED | OUTPATIENT
Start: 2024-02-11 | End: 2024-02-14 | Stop reason: HOSPADM

## 2024-02-11 RX ORDER — NITROFURANTOIN 25; 75 MG/1; MG/1
100 CAPSULE ORAL 2 TIMES DAILY
Status: DISCONTINUED | OUTPATIENT
Start: 2024-02-11 | End: 2024-02-14 | Stop reason: HOSPADM

## 2024-02-11 RX ORDER — PANTOPRAZOLE SODIUM 40 MG/1
40 TABLET, DELAYED RELEASE ORAL
Status: DISCONTINUED | OUTPATIENT
Start: 2024-02-12 | End: 2024-02-14 | Stop reason: HOSPADM

## 2024-02-11 RX ADMIN — MORPHINE SULFATE 1 MG: 2 INJECTION, SOLUTION INTRAMUSCULAR; INTRAVENOUS at 13:12

## 2024-02-11 RX ADMIN — Medication 1000 MG: at 04:11

## 2024-02-11 RX ADMIN — INSULIN LISPRO 2 UNITS: 100 INJECTION, SOLUTION INTRAVENOUS; SUBCUTANEOUS at 13:12

## 2024-02-11 RX ADMIN — PANTOPRAZOLE SODIUM 40 MG: 40 INJECTION, POWDER, FOR SOLUTION INTRAVENOUS at 09:45

## 2024-02-11 RX ADMIN — SODIUM CHLORIDE: 9 INJECTION, SOLUTION INTRAVENOUS at 04:14

## 2024-02-11 RX ADMIN — Medication 10 MG: at 22:44

## 2024-02-11 RX ADMIN — MORPHINE SULFATE 1 MG: 2 INJECTION, SOLUTION INTRAMUSCULAR; INTRAVENOUS at 04:16

## 2024-02-11 RX ADMIN — OXYCODONE 5 MG: 5 TABLET ORAL at 19:37

## 2024-02-11 RX ADMIN — INSULIN GLARGINE 30 UNITS: 100 INJECTION, SOLUTION SUBCUTANEOUS at 19:35

## 2024-02-11 RX ADMIN — POTASSIUM CHLORIDE 20 MEQ: 1500 TABLET, EXTENDED RELEASE ORAL at 09:45

## 2024-02-11 RX ADMIN — ENOXAPARIN SODIUM 30 MG: 100 INJECTION SUBCUTANEOUS at 09:45

## 2024-02-11 RX ADMIN — INSULIN LISPRO 4 UNITS: 100 INJECTION, SOLUTION INTRAVENOUS; SUBCUTANEOUS at 17:54

## 2024-02-11 RX ADMIN — POTASSIUM CHLORIDE 40 MEQ: 1500 TABLET, EXTENDED RELEASE ORAL at 06:17

## 2024-02-11 RX ADMIN — INSULIN LISPRO 4 UNITS: 100 INJECTION, SOLUTION INTRAVENOUS; SUBCUTANEOUS at 19:34

## 2024-02-11 RX ADMIN — SODIUM CHLORIDE: 9 INJECTION, SOLUTION INTRAVENOUS at 19:16

## 2024-02-11 RX ADMIN — NITROFURANTOIN MONOHYDRATE/MACROCRYSTALLINE 100 MG: 25; 75 CAPSULE ORAL at 19:37

## 2024-02-11 RX ADMIN — ENOXAPARIN SODIUM 30 MG: 100 INJECTION SUBCUTANEOUS at 19:36

## 2024-02-11 NOTE — PLAN OF CARE
Problem: Discharge Planning  Goal: Discharge to home or other facility with appropriate resources  2/10/2024 2149 by Rupal Coburn RN  Outcome: Progressing  2/10/2024 1921 by Evelin Brito, RN  Outcome: Progressing     Problem: Pain  Goal: Verbalizes/displays adequate comfort level or baseline comfort level  2/10/2024 2149 by Rupal Coburn RN  Outcome: Progressing  2/10/2024 1921 by Evelin Brito, RN  Outcome: Progressing     Problem: Safety - Adult  Goal: Free from fall injury  2/10/2024 2149 by Rupal Coburn RN  Outcome: Progressing  2/10/2024 1921 by Evelin Brito, RN  Outcome: Progressing

## 2024-02-12 PROBLEM — E87.6 HYPOKALEMIA: Status: ACTIVE | Noted: 2024-02-12

## 2024-02-12 LAB
ANION GAP SERPL CALCULATED.3IONS-SCNC: 7 MMOL/L (ref 9–17)
BASOPHILS # BLD: 0.04 K/UL (ref 0–0.2)
BASOPHILS NFR BLD: 1 % (ref 0–2)
BUN SERPL-MCNC: 6 MG/DL (ref 6–20)
CALCIUM SERPL-MCNC: 8.5 MG/DL (ref 8.6–10.4)
CHLORIDE SERPL-SCNC: 112 MMOL/L (ref 98–107)
CO2 SERPL-SCNC: 23 MMOL/L (ref 20–31)
CREAT SERPL-MCNC: 0.5 MG/DL (ref 0.5–0.9)
EKG ATRIAL RATE: 102 BPM
EKG P AXIS: 16 DEGREES
EKG P-R INTERVAL: 150 MS
EKG Q-T INTERVAL: 356 MS
EKG QRS DURATION: 66 MS
EKG QTC CALCULATION (BAZETT): 463 MS
EKG R AXIS: -21 DEGREES
EKG T AXIS: 54 DEGREES
EKG VENTRICULAR RATE: 102 BPM
EOSINOPHIL # BLD: 0.09 K/UL (ref 0–0.44)
EOSINOPHILS RELATIVE PERCENT: 2 % (ref 1–4)
ERYTHROCYTE [DISTWIDTH] IN BLOOD BY AUTOMATED COUNT: 12.8 % (ref 11.8–14.4)
GFR SERPL CREATININE-BSD FRML MDRD: >60 ML/MIN/1.73M2
GLUCOSE BLD-MCNC: 127 MG/DL (ref 65–105)
GLUCOSE BLD-MCNC: 156 MG/DL (ref 65–105)
GLUCOSE BLD-MCNC: 189 MG/DL (ref 65–105)
GLUCOSE BLD-MCNC: 209 MG/DL (ref 65–105)
GLUCOSE BLD-MCNC: 223 MG/DL (ref 65–105)
GLUCOSE BLD-MCNC: 99 MG/DL (ref 65–105)
GLUCOSE SERPL-MCNC: 155 MG/DL (ref 70–99)
HCT VFR BLD AUTO: 42.1 % (ref 36.3–47.1)
HGB BLD-MCNC: 12.9 G/DL (ref 11.9–15.1)
IMM GRANULOCYTES # BLD AUTO: <0.03 K/UL (ref 0–0.3)
IMM GRANULOCYTES NFR BLD: 0 %
LYMPHOCYTES NFR BLD: 2.58 K/UL (ref 1.1–3.7)
LYMPHOCYTES RELATIVE PERCENT: 46 % (ref 24–43)
MCH RBC QN AUTO: 28.7 PG (ref 25.2–33.5)
MCHC RBC AUTO-ENTMCNC: 30.6 G/DL (ref 28.4–34.8)
MCV RBC AUTO: 93.6 FL (ref 82.6–102.9)
MONOCYTES NFR BLD: 0.36 K/UL (ref 0.1–1.2)
MONOCYTES NFR BLD: 6 % (ref 3–12)
NEUTROPHILS NFR BLD: 45 % (ref 36–65)
NEUTS SEG NFR BLD: 2.54 K/UL (ref 1.5–8.1)
NRBC BLD-RTO: 0 PER 100 WBC
PLATELET # BLD AUTO: 202 K/UL (ref 138–453)
PMV BLD AUTO: 12 FL (ref 8.1–13.5)
POTASSIUM SERPL-SCNC: 3.6 MMOL/L (ref 3.7–5.3)
RBC # BLD AUTO: 4.5 M/UL (ref 3.95–5.11)
SODIUM SERPL-SCNC: 142 MMOL/L (ref 135–144)
WBC OTHER # BLD: 5.6 K/UL (ref 3.5–11.3)

## 2024-02-12 PROCEDURE — 99231 SBSQ HOSP IP/OBS SF/LOW 25: CPT | Performed by: INTERNAL MEDICINE

## 2024-02-12 PROCEDURE — 99232 SBSQ HOSP IP/OBS MODERATE 35: CPT | Performed by: PHYSICIAN ASSISTANT

## 2024-02-12 PROCEDURE — 2060000000 HC ICU INTERMEDIATE R&B

## 2024-02-12 PROCEDURE — 6370000000 HC RX 637 (ALT 250 FOR IP): Performed by: PHYSICIAN ASSISTANT

## 2024-02-12 PROCEDURE — 82947 ASSAY GLUCOSE BLOOD QUANT: CPT

## 2024-02-12 PROCEDURE — 6370000000 HC RX 637 (ALT 250 FOR IP): Performed by: NURSE PRACTITIONER

## 2024-02-12 PROCEDURE — 85025 COMPLETE CBC W/AUTO DIFF WBC: CPT

## 2024-02-12 PROCEDURE — 36415 COLL VENOUS BLD VENIPUNCTURE: CPT

## 2024-02-12 PROCEDURE — 6360000002 HC RX W HCPCS: Performed by: PHYSICIAN ASSISTANT

## 2024-02-12 PROCEDURE — 80048 BASIC METABOLIC PNL TOTAL CA: CPT

## 2024-02-12 RX ORDER — MORPHINE SULFATE 2 MG/ML
1 INJECTION, SOLUTION INTRAMUSCULAR; INTRAVENOUS ONCE
Status: COMPLETED | OUTPATIENT
Start: 2024-02-12 | End: 2024-02-12

## 2024-02-12 RX ORDER — POTASSIUM CHLORIDE 20 MEQ/1
20 TABLET, EXTENDED RELEASE ORAL ONCE
Status: COMPLETED | OUTPATIENT
Start: 2024-02-12 | End: 2024-02-12

## 2024-02-12 RX ADMIN — PANTOPRAZOLE SODIUM 40 MG: 40 TABLET, DELAYED RELEASE ORAL at 10:10

## 2024-02-12 RX ADMIN — POTASSIUM CHLORIDE 20 MEQ: 1500 TABLET, EXTENDED RELEASE ORAL at 10:11

## 2024-02-12 RX ADMIN — OXYCODONE 5 MG: 5 TABLET ORAL at 10:10

## 2024-02-12 RX ADMIN — OXYCODONE 5 MG: 5 TABLET ORAL at 20:43

## 2024-02-12 RX ADMIN — MORPHINE SULFATE 1 MG: 2 INJECTION, SOLUTION INTRAMUSCULAR; INTRAVENOUS at 13:08

## 2024-02-12 RX ADMIN — NITROFURANTOIN MONOHYDRATE/MACROCRYSTALLINE 100 MG: 25; 75 CAPSULE ORAL at 10:13

## 2024-02-12 RX ADMIN — INSULIN GLARGINE 30 UNITS: 100 INJECTION, SOLUTION SUBCUTANEOUS at 20:44

## 2024-02-12 RX ADMIN — Medication 10 MG: at 20:44

## 2024-02-12 RX ADMIN — NITROFURANTOIN MONOHYDRATE/MACROCRYSTALLINE 100 MG: 25; 75 CAPSULE ORAL at 20:43

## 2024-02-12 NOTE — PLAN OF CARE
Problem: Discharge Planning  Goal: Discharge to home or other facility with appropriate resources  2/12/2024 0755 by Miguelito Cardenas, RN  Outcome: Progressing  2/12/2024 0231 by Rupal Coburn RN  Outcome: Progressing     Problem: Pain  Goal: Verbalizes/displays adequate comfort level or baseline comfort level  2/12/2024 0755 by Miguelito Cardenas, RN  Outcome: Progressing  2/12/2024 0231 by Rupal Coburn, JUNIOR  Outcome: Progressing     Problem: Safety - Adult  Goal: Free from fall injury  2/12/2024 0755 by Miguelito Cardenas, RN  Outcome: Progressing  2/12/2024 0231 by Rupal Coburn, JUNIOR  Outcome: Progressing

## 2024-02-12 NOTE — PLAN OF CARE
Problem: Discharge Planning  Goal: Discharge to home or other facility with appropriate resources  2/12/2024 1450 by Li Brown RN  Outcome: Progressing  Flowsheets (Taken 2/12/2024 1200)  Discharge to home or other facility with appropriate resources: Identify barriers to discharge with patient and caregiver  2/12/2024 0755 by Miguelito Cardenas RN  Outcome: Progressing  2/12/2024 0231 by Rupal Coburn RN  Outcome: Progressing     Problem: Pain  Goal: Verbalizes/displays adequate comfort level or baseline comfort level  2/12/2024 1450 by Li Brown RN  Outcome: Progressing  Flowsheets (Taken 2/12/2024 1208)  Verbalizes/displays adequate comfort level or baseline comfort level: Encourage patient to monitor pain and request assistance  2/12/2024 0755 by Miguelito Cardenas RN  Outcome: Progressing  2/12/2024 0231 by Rupal Coburn RN  Outcome: Progressing     Problem: Safety - Adult  Goal: Free from fall injury  2/12/2024 1450 by Li Brown RN  Outcome: Progressing  2/12/2024 0755 by Miguelito Cardenas RN  Outcome: Progressing  2/12/2024 0231 by Rupal Coburn RN  Outcome: Progressing

## 2024-02-13 ENCOUNTER — ANESTHESIA EVENT (OUTPATIENT)
Dept: OPERATING ROOM | Age: 34
DRG: 637 | End: 2024-02-13
Payer: MEDICARE

## 2024-02-13 ENCOUNTER — ANESTHESIA (OUTPATIENT)
Dept: OPERATING ROOM | Age: 34
DRG: 637 | End: 2024-02-13
Payer: MEDICARE

## 2024-02-13 ENCOUNTER — APPOINTMENT (OUTPATIENT)
Dept: ULTRASOUND IMAGING | Age: 34
DRG: 637 | End: 2024-02-13
Payer: MEDICARE

## 2024-02-13 PROBLEM — Z79.01 CHRONIC ANTICOAGULATION: Status: ACTIVE | Noted: 2024-02-13

## 2024-02-13 PROBLEM — E66.9 OBESITY (BMI 30-39.9): Status: ACTIVE | Noted: 2024-02-13

## 2024-02-13 LAB
ALBUMIN SERPL-MCNC: 2.9 G/DL (ref 3.5–5.2)
ALBUMIN/GLOB SERPL: 1 {RATIO} (ref 1–2.5)
ALP SERPL-CCNC: 113 U/L (ref 35–104)
ALT SERPL-CCNC: <5 U/L (ref 5–33)
AMYLASE FLD-CCNC: NORMAL U/L
ANION GAP SERPL CALCULATED.3IONS-SCNC: 7 MMOL/L (ref 9–17)
AST SERPL-CCNC: 8 U/L
BILIRUB SERPL-MCNC: 0.3 MG/DL (ref 0.3–1.2)
BUN SERPL-MCNC: 6 MG/DL (ref 6–20)
CALCIUM SERPL-MCNC: 8.7 MG/DL (ref 8.6–10.4)
CHLORIDE SERPL-SCNC: 109 MMOL/L (ref 98–107)
CO2 SERPL-SCNC: 24 MMOL/L (ref 20–31)
CREAT SERPL-MCNC: 0.6 MG/DL (ref 0.5–0.9)
ERYTHROCYTE [DISTWIDTH] IN BLOOD BY AUTOMATED COUNT: 12.8 % (ref 11.8–14.4)
GFR SERPL CREATININE-BSD FRML MDRD: >60 ML/MIN/1.73M2
GLUCOSE BLD-MCNC: 139 MG/DL (ref 65–105)
GLUCOSE BLD-MCNC: 142 MG/DL (ref 65–105)
GLUCOSE BLD-MCNC: 155 MG/DL (ref 65–105)
GLUCOSE BLD-MCNC: 205 MG/DL (ref 65–105)
GLUCOSE FLD-MCNC: 158 MG/DL
GLUCOSE SERPL-MCNC: 258 MG/DL (ref 70–99)
HCT VFR BLD AUTO: 42.5 % (ref 36.3–47.1)
HGB BLD-MCNC: 12.9 G/DL (ref 11.9–15.1)
MCH RBC QN AUTO: 28.9 PG (ref 25.2–33.5)
MCHC RBC AUTO-ENTMCNC: 30.4 G/DL (ref 28.4–34.8)
MCV RBC AUTO: 95.1 FL (ref 82.6–102.9)
NRBC BLD-RTO: 0 PER 100 WBC
PLATELET # BLD AUTO: 180 K/UL (ref 138–453)
PMV BLD AUTO: 12.7 FL (ref 8.1–13.5)
POTASSIUM SERPL-SCNC: 3.7 MMOL/L (ref 3.7–5.3)
PROT SERPL-MCNC: 5.8 G/DL (ref 6.4–8.3)
RBC # BLD AUTO: 4.47 M/UL (ref 3.95–5.11)
SODIUM SERPL-SCNC: 140 MMOL/L (ref 135–144)
SPECIMEN TYPE: NORMAL
SPECIMEN TYPE: NORMAL
WBC OTHER # BLD: 5.9 K/UL (ref 3.5–11.3)

## 2024-02-13 PROCEDURE — G0108 DIAB MANAGE TRN  PER INDIV: HCPCS

## 2024-02-13 PROCEDURE — 3609020800 HC EGD W/EUS FNA: Performed by: INTERNAL MEDICINE

## 2024-02-13 PROCEDURE — 7100000010 HC PHASE II RECOVERY - FIRST 15 MIN: Performed by: INTERNAL MEDICINE

## 2024-02-13 PROCEDURE — 85027 COMPLETE CBC AUTOMATED: CPT

## 2024-02-13 PROCEDURE — 99232 SBSQ HOSP IP/OBS MODERATE 35: CPT | Performed by: INTERNAL MEDICINE

## 2024-02-13 PROCEDURE — 6370000000 HC RX 637 (ALT 250 FOR IP): Performed by: PHYSICIAN ASSISTANT

## 2024-02-13 PROCEDURE — 6370000000 HC RX 637 (ALT 250 FOR IP): Performed by: INTERNAL MEDICINE

## 2024-02-13 PROCEDURE — 82150 ASSAY OF AMYLASE: CPT

## 2024-02-13 PROCEDURE — 2709999900 HC NON-CHARGEABLE SUPPLY: Performed by: INTERNAL MEDICINE

## 2024-02-13 PROCEDURE — 36415 COLL VENOUS BLD VENIPUNCTURE: CPT

## 2024-02-13 PROCEDURE — 0DB98ZX EXCISION OF DUODENUM, VIA NATURAL OR ARTIFICIAL OPENING ENDOSCOPIC, DIAGNOSTIC: ICD-10-PCS | Performed by: INTERNAL MEDICINE

## 2024-02-13 PROCEDURE — 2500000003 HC RX 250 WO HCPCS: Performed by: NURSE ANESTHETIST, CERTIFIED REGISTERED

## 2024-02-13 PROCEDURE — 7100000011 HC PHASE II RECOVERY - ADDTL 15 MIN: Performed by: INTERNAL MEDICINE

## 2024-02-13 PROCEDURE — 3700000000 HC ANESTHESIA ATTENDED CARE: Performed by: INTERNAL MEDICINE

## 2024-02-13 PROCEDURE — 0F9G8ZX DRAINAGE OF PANCREAS, VIA NATURAL OR ARTIFICIAL OPENING ENDOSCOPIC, DIAGNOSTIC: ICD-10-PCS | Performed by: INTERNAL MEDICINE

## 2024-02-13 PROCEDURE — 2580000003 HC RX 258: Performed by: NURSE ANESTHETIST, CERTIFIED REGISTERED

## 2024-02-13 PROCEDURE — 3609012400 HC EGD TRANSORAL BIOPSY SINGLE/MULTIPLE: Performed by: INTERNAL MEDICINE

## 2024-02-13 PROCEDURE — 2580000003 HC RX 258: Performed by: INTERNAL MEDICINE

## 2024-02-13 PROCEDURE — 6360000002 HC RX W HCPCS: Performed by: NURSE ANESTHETIST, CERTIFIED REGISTERED

## 2024-02-13 PROCEDURE — 3700000001 HC ADD 15 MINUTES (ANESTHESIA): Performed by: INTERNAL MEDICINE

## 2024-02-13 PROCEDURE — 1200000000 HC SEMI PRIVATE

## 2024-02-13 PROCEDURE — 88305 TISSUE EXAM BY PATHOLOGIST: CPT

## 2024-02-13 PROCEDURE — 82945 GLUCOSE OTHER FLUID: CPT

## 2024-02-13 PROCEDURE — 76975 GI ENDOSCOPIC ULTRASOUND: CPT | Performed by: INTERNAL MEDICINE

## 2024-02-13 PROCEDURE — 82947 ASSAY GLUCOSE BLOOD QUANT: CPT

## 2024-02-13 PROCEDURE — 80053 COMPREHEN METABOLIC PANEL: CPT

## 2024-02-13 RX ORDER — DEXTROSE MONOHYDRATE 100 MG/ML
INJECTION, SOLUTION INTRAVENOUS CONTINUOUS PRN
Status: DISCONTINUED | OUTPATIENT
Start: 2024-02-13 | End: 2024-02-14 | Stop reason: HOSPADM

## 2024-02-13 RX ORDER — SODIUM CHLORIDE, SODIUM LACTATE, POTASSIUM CHLORIDE, CALCIUM CHLORIDE 600; 310; 30; 20 MG/100ML; MG/100ML; MG/100ML; MG/100ML
INJECTION, SOLUTION INTRAVENOUS CONTINUOUS PRN
Status: DISCONTINUED | OUTPATIENT
Start: 2024-02-13 | End: 2024-02-13 | Stop reason: SDUPTHER

## 2024-02-13 RX ORDER — SODIUM CHLORIDE 9 MG/ML
INJECTION, SOLUTION INTRAVENOUS PRN
Status: DISCONTINUED | OUTPATIENT
Start: 2024-02-13 | End: 2024-02-13 | Stop reason: HOSPADM

## 2024-02-13 RX ORDER — CEFAZOLIN SODIUM 1 G/3ML
INJECTION, POWDER, FOR SOLUTION INTRAMUSCULAR; INTRAVENOUS PRN
Status: DISCONTINUED | OUTPATIENT
Start: 2024-02-13 | End: 2024-02-13 | Stop reason: SDUPTHER

## 2024-02-13 RX ORDER — PROPOFOL 10 MG/ML
INJECTION, EMULSION INTRAVENOUS PRN
Status: DISCONTINUED | OUTPATIENT
Start: 2024-02-13 | End: 2024-02-13 | Stop reason: SDUPTHER

## 2024-02-13 RX ORDER — SODIUM CHLORIDE 0.9 % (FLUSH) 0.9 %
5-40 SYRINGE (ML) INJECTION PRN
Status: DISCONTINUED | OUTPATIENT
Start: 2024-02-13 | End: 2024-02-13 | Stop reason: HOSPADM

## 2024-02-13 RX ORDER — SODIUM CHLORIDE 0.9 % (FLUSH) 0.9 %
5-40 SYRINGE (ML) INJECTION EVERY 12 HOURS SCHEDULED
Status: DISCONTINUED | OUTPATIENT
Start: 2024-02-13 | End: 2024-02-13 | Stop reason: HOSPADM

## 2024-02-13 RX ORDER — GLYCOPYRROLATE 0.2 MG/ML
INJECTION INTRAMUSCULAR; INTRAVENOUS PRN
Status: DISCONTINUED | OUTPATIENT
Start: 2024-02-13 | End: 2024-02-13 | Stop reason: SDUPTHER

## 2024-02-13 RX ORDER — LIDOCAINE HYDROCHLORIDE 10 MG/ML
INJECTION, SOLUTION EPIDURAL; INFILTRATION; INTRACAUDAL; PERINEURAL PRN
Status: DISCONTINUED | OUTPATIENT
Start: 2024-02-13 | End: 2024-02-13 | Stop reason: SDUPTHER

## 2024-02-13 RX ORDER — FENTANYL CITRATE 50 UG/ML
INJECTION, SOLUTION INTRAMUSCULAR; INTRAVENOUS PRN
Status: DISCONTINUED | OUTPATIENT
Start: 2024-02-13 | End: 2024-02-13 | Stop reason: SDUPTHER

## 2024-02-13 RX ADMIN — CEFAZOLIN 2 G: 1 INJECTION, POWDER, FOR SOLUTION INTRAMUSCULAR; INTRAVENOUS at 16:03

## 2024-02-13 RX ADMIN — PANTOPRAZOLE SODIUM 40 MG: 40 TABLET, DELAYED RELEASE ORAL at 08:49

## 2024-02-13 RX ADMIN — OXYCODONE 5 MG: 5 TABLET ORAL at 17:15

## 2024-02-13 RX ADMIN — SODIUM CHLORIDE, POTASSIUM CHLORIDE, SODIUM LACTATE AND CALCIUM CHLORIDE: 600; 310; 30; 20 INJECTION, SOLUTION INTRAVENOUS at 15:38

## 2024-02-13 RX ADMIN — INSULIN LISPRO 2 UNITS: 100 INJECTION, SOLUTION INTRAVENOUS; SUBCUTANEOUS at 08:50

## 2024-02-13 RX ADMIN — NITROFURANTOIN MONOHYDRATE/MACROCRYSTALLINE 100 MG: 25; 75 CAPSULE ORAL at 08:49

## 2024-02-13 RX ADMIN — FENTANYL CITRATE 25 MCG: 50 INJECTION, SOLUTION INTRAMUSCULAR; INTRAVENOUS at 16:04

## 2024-02-13 RX ADMIN — PROPOFOL 200 MCG/KG/MIN: 10 INJECTION, EMULSION INTRAVENOUS at 15:51

## 2024-02-13 RX ADMIN — GLYCOPYRROLATE 0.2 MG: 0.2 INJECTION INTRAMUSCULAR; INTRAVENOUS at 15:51

## 2024-02-13 RX ADMIN — NITROFURANTOIN MONOHYDRATE/MACROCRYSTALLINE 100 MG: 25; 75 CAPSULE ORAL at 20:37

## 2024-02-13 RX ADMIN — SODIUM CHLORIDE: 9 INJECTION, SOLUTION INTRAVENOUS at 20:45

## 2024-02-13 RX ADMIN — PROPOFOL 50 MG: 10 INJECTION, EMULSION INTRAVENOUS at 15:50

## 2024-02-13 RX ADMIN — FENTANYL CITRATE 25 MCG: 50 INJECTION, SOLUTION INTRAMUSCULAR; INTRAVENOUS at 15:48

## 2024-02-13 RX ADMIN — FENTANYL CITRATE 25 MCG: 50 INJECTION, SOLUTION INTRAMUSCULAR; INTRAVENOUS at 15:59

## 2024-02-13 RX ADMIN — PROPOFOL 50 MG: 10 INJECTION, EMULSION INTRAVENOUS at 15:48

## 2024-02-13 RX ADMIN — INSULIN GLARGINE 30 UNITS: 100 INJECTION, SOLUTION SUBCUTANEOUS at 20:37

## 2024-02-13 RX ADMIN — LIDOCAINE HYDROCHLORIDE 0.2 MG: 10 INJECTION, SOLUTION EPIDURAL; INFILTRATION; INTRACAUDAL; PERINEURAL at 15:50

## 2024-02-13 RX ADMIN — LIDOCAINE HYDROCHLORIDE 50 MG: 10 INJECTION, SOLUTION EPIDURAL; INFILTRATION; INTRACAUDAL; PERINEURAL at 15:53

## 2024-02-13 RX ADMIN — FENTANYL CITRATE 25 MCG: 50 INJECTION, SOLUTION INTRAMUSCULAR; INTRAVENOUS at 16:13

## 2024-02-13 NOTE — PLAN OF CARE
GI update:    Pt scheduled for EUS today  Lungs clear  Heart strong and regular  Pt A/O x 4    Will await results    Catrachito Song CNP

## 2024-02-13 NOTE — ANESTHESIA POSTPROCEDURE EVALUATION
Department of Anesthesiology  Postprocedure Note    Patient: Ruth Martínez  MRN: [id number]  YOB: 1990  Date of evaluation: 2/13/2024    Procedure Summary       Date: 02/13/24 Room / Location: [address]    Anesthesia Start: 1541 Anesthesia Stop: 1616    Procedures:       EGD W/EUS FNA      EGD BIOPSY Diagnosis:       Mass of head of pancreas      (Mass of head of pancreas [K86.89])    Surgeons: Maria Fernanda Fry MD Responsible Provider: Cecil Salas MD    Anesthesia Type: MAC ASA Status: 3            Anesthesia Type: No value filed.    Natalya Phase I: Natalya Score: 10    Natalya Phase II:      Anesthesia Post Evaluation    Patient location during evaluation: bedside  Patient participation: complete - patient participated  Level of consciousness: awake  Airway patency: patent  Nausea & Vomiting: no nausea and no vomiting  Cardiovascular status: hemodynamically stable  Respiratory status: acceptable  Hydration status: stable  Comments: BP (!) 120/95   Pulse 79   Temp 96.8 °F (36 °C)   Resp 18   Ht 1.702 m (5' 7\")   Wt 104.6 kg (230 lb 9.6 oz)   SpO2 97%   BMI 36.12 kg/m²       No notable events documented.

## 2024-02-13 NOTE — ANESTHESIA PRE PROCEDURE
(36 °C)   TempSrc: Oral Oral Oral Temporal   SpO2:   98% 100%   Weight:    104.6 kg (230 lb 9.6 oz)   Height:    1.702 m (5' 7\")                                              BP Readings from Last 3 Encounters:   02/13/24 (!) 120/95   12/24/23 102/76   05/06/16 118/82       NPO Status:                                                                                 BMI:   Wt Readings from Last 3 Encounters:   02/13/24 104.6 kg (230 lb 9.6 oz)   12/23/23 112.7 kg (248 lb 7.3 oz)   05/06/16 (!) 145.1 kg (319 lb 12.8 oz)     Body mass index is 36.12 kg/m².    CBC:   Lab Results   Component Value Date/Time    WBC 5.9 02/13/2024 06:35 AM    RBC 4.47 02/13/2024 06:35 AM    HGB 12.9 02/13/2024 06:35 AM    HCT 42.5 02/13/2024 06:35 AM    MCV 95.1 02/13/2024 06:35 AM    RDW 12.8 02/13/2024 06:35 AM     02/13/2024 06:35 AM       CMP:   Lab Results   Component Value Date/Time     02/13/2024 06:35 AM    K 3.7 02/13/2024 06:35 AM     02/13/2024 06:35 AM    CO2 24 02/13/2024 06:35 AM    BUN 6 02/13/2024 06:35 AM    CREATININE 0.6 02/13/2024 06:35 AM    AGRATIO 1.0 02/13/2024 06:35 AM    LABGLOM >60 02/13/2024 06:35 AM    GLUCOSE 258 02/13/2024 06:35 AM    PROT 5.8 02/13/2024 06:35 AM    CALCIUM 8.7 02/13/2024 06:35 AM    BILITOT 0.3 02/13/2024 06:35 AM    ALKPHOS 113 02/13/2024 06:35 AM    AST 8 02/13/2024 06:35 AM    ALT <5 02/13/2024 06:35 AM       POC Tests:   Recent Labs     02/13/24  1151   POCGLU 142*       Coags:   Lab Results   Component Value Date/Time    PROTIME 13.3 02/09/2024 12:17 PM    INR 1.0 02/09/2024 12:17 PM    APTT 23.8 02/09/2024 12:17 PM       HCG (If Applicable):   Lab Results   Component Value Date    PREGTESTUR NEGATIVE 12/21/2023        ABGs: No results found for: \"PHART\", \"PO2ART\", \"BKY6NEQ\", \"KSD5YJF\", \"BEART\", \"C6TRPFLO\"     Type & Screen (If Applicable):  No results found for: \"LABABO\", \"LABRH\"    Drug/Infectious Status (If Applicable):  No results found for: \"HIV\",

## 2024-02-13 NOTE — OP NOTE
PROCEDURE NOTE    DATE OF PROCEDURE: 2/13/2024     SURGEON: Maria Fernanda Fry MD  Facility: [facility]  ASSISTANT: None  Anesthesia: Monitored anesthesia care  PREOPERATIVE DIAGNOSIS: Acute on chronic pancreatitis with pancreatic head mass    Diagnosis:    POSTOPERATIVE DIAGNOSIS: As described below    OPERATION: Upper GI endoscopy with Endoscopic ultrasound with FNA    ANESTHESIA: Moderate Sedation     ESTIMATED BLOOD LOSS: Less than 50 ml    COMPLICATIONS: None.     SPECIMENS:  Was Obtained: duodenal polyp; pancreatic cyst aspiration    HISTORY: The patient is a 33 y.o. year old female with history of above preop diagnosis.  I recommended esophagogastroduodenoscopy with possible biopsy and I explained the risk, benefits, expected outcome, and alternatives to the procedure.  Risks included but are not limited to bleeding, infection, respiratory distress, hypotension, and perforation of the esophagus, stomach, or duodenum.  Patient understands and is in agreement.      PROCEDURE: The patient was given IV conscious sedation.  The patient's SPO2 remained above 90% throughout the procedure.The gastroscope was inserted orally and advanced under direct vision through the esophagus, through the stomach, through the pylorus, and into the descending duodenum.      Post sedation note :The patient's SPO2 remained above 90% throughout the procedure.the vital signs remained stable , and no immediate complication form the procedure noted, patient will be ready for d/c when criteria is met .      Findings:    Retropharyngeal area was grossly normal appearing    Esophagus: abnormal: hiatal hernia    Esophagogastric markings: Diaphragmatic hiatus- 40 cm; GE junction- 36 cm; Squamo-columnar junction- 36 cm    Stomach:    Fundus: normal    Body: abnormal: surgical changes of sleeve surgery    Antrum: normal    Duodenum:     Descending: abnormal: small (6 mm) polyp, biopsy obtained    Bulb: normal    The scope was removed and the patient

## 2024-02-13 NOTE — PLAN OF CARE
Problem: Discharge Planning  Goal: Discharge to home or other facility with appropriate resources  2/13/2024 1256 by Mellisa Munroe, RN  Outcome: Progressing  Flowsheets (Taken 2/13/2024 0850)  Discharge to home or other facility with appropriate resources:   Identify barriers to discharge with patient and caregiver   Arrange for needed discharge resources and transportation as appropriate   Identify discharge learning needs (meds, wound care, etc)  2/13/2024 0539 by Svetlana Rain RN  Outcome: Progressing     Problem: Pain  Goal: Verbalizes/displays adequate comfort level or baseline comfort level  2/13/2024 1256 by Mellisa Munroe, RN  Outcome: Progressing  2/13/2024 0539 by Svetlana Rain RN  Outcome: Progressing     Problem: Safety - Adult  Goal: Free from fall injury  2/13/2024 1256 by Mellisa Munroe, RN  Outcome: Progressing  2/13/2024 0539 by Svetlana Rain RN  Outcome: Progressing

## 2024-02-14 VITALS
HEIGHT: 67 IN | SYSTOLIC BLOOD PRESSURE: 109 MMHG | OXYGEN SATURATION: 98 % | RESPIRATION RATE: 16 BRPM | DIASTOLIC BLOOD PRESSURE: 72 MMHG | HEART RATE: 73 BPM | WEIGHT: 230.6 LBS | BODY MASS INDEX: 36.19 KG/M2 | TEMPERATURE: 98.2 F

## 2024-02-14 PROBLEM — R97.0 ELEVATED CEA: Status: ACTIVE | Noted: 2024-02-14

## 2024-02-14 LAB
ALBUMIN SERPL-MCNC: 2.6 G/DL (ref 3.5–5.2)
ALBUMIN/GLOB SERPL: 1 {RATIO} (ref 1–2.5)
ALP SERPL-CCNC: 90 U/L (ref 35–104)
ALT SERPL-CCNC: 6 U/L (ref 5–33)
ANION GAP SERPL CALCULATED.3IONS-SCNC: 6 MMOL/L (ref 9–17)
AST SERPL-CCNC: 20 U/L
BILIRUB SERPL-MCNC: 0.2 MG/DL (ref 0.3–1.2)
BUN SERPL-MCNC: 4 MG/DL (ref 6–20)
CA-I BLD-SCNC: 1.2 MMOL/L (ref 1.13–1.33)
CALCIUM SERPL-MCNC: 8.7 MG/DL (ref 8.6–10.4)
CHLORIDE SERPL-SCNC: 109 MMOL/L (ref 98–107)
CO2 SERPL-SCNC: 26 MMOL/L (ref 20–31)
CREAT SERPL-MCNC: 0.5 MG/DL (ref 0.5–0.9)
ERYTHROCYTE [DISTWIDTH] IN BLOOD BY AUTOMATED COUNT: 13.1 % (ref 11.8–14.4)
GFR SERPL CREATININE-BSD FRML MDRD: >60 ML/MIN/1.73M2
GLUCOSE BLD-MCNC: 69 MG/DL (ref 65–105)
GLUCOSE BLD-MCNC: 93 MG/DL (ref 65–105)
GLUCOSE SERPL-MCNC: 81 MG/DL (ref 70–99)
HCT VFR BLD AUTO: 39.4 % (ref 36.3–47.1)
HGB BLD-MCNC: 12.5 G/DL (ref 11.9–15.1)
LIPASE SERPL-CCNC: 48 U/L (ref 13–60)
MCH RBC QN AUTO: 29.1 PG (ref 25.2–33.5)
MCHC RBC AUTO-ENTMCNC: 31.7 G/DL (ref 28.4–34.8)
MCV RBC AUTO: 91.6 FL (ref 82.6–102.9)
NRBC BLD-RTO: 0 PER 100 WBC
PLATELET # BLD AUTO: 200 K/UL (ref 138–453)
PMV BLD AUTO: 12.6 FL (ref 8.1–13.5)
POTASSIUM SERPL-SCNC: 3.3 MMOL/L (ref 3.7–5.3)
PROT SERPL-MCNC: 5.3 G/DL (ref 6.4–8.3)
RBC # BLD AUTO: 4.3 M/UL (ref 3.95–5.11)
SEND OUT REPORT: NORMAL
SODIUM SERPL-SCNC: 141 MMOL/L (ref 135–144)
TEST NAME: NORMAL
WBC OTHER # BLD: 4.3 K/UL (ref 3.5–11.3)

## 2024-02-14 PROCEDURE — 80053 COMPREHEN METABOLIC PANEL: CPT

## 2024-02-14 PROCEDURE — 82330 ASSAY OF CALCIUM: CPT

## 2024-02-14 PROCEDURE — 99232 SBSQ HOSP IP/OBS MODERATE 35: CPT | Performed by: INTERNAL MEDICINE

## 2024-02-14 PROCEDURE — 36415 COLL VENOUS BLD VENIPUNCTURE: CPT

## 2024-02-14 PROCEDURE — 85027 COMPLETE CBC AUTOMATED: CPT

## 2024-02-14 PROCEDURE — 6370000000 HC RX 637 (ALT 250 FOR IP): Performed by: INTERNAL MEDICINE

## 2024-02-14 PROCEDURE — 83690 ASSAY OF LIPASE: CPT

## 2024-02-14 PROCEDURE — 82947 ASSAY GLUCOSE BLOOD QUANT: CPT

## 2024-02-14 PROCEDURE — 2580000003 HC RX 258: Performed by: INTERNAL MEDICINE

## 2024-02-14 RX ORDER — NITROFURANTOIN 25; 75 MG/1; MG/1
100 CAPSULE ORAL 2 TIMES DAILY
Qty: 4 CAPSULE | Refills: 0 | Status: SHIPPED | OUTPATIENT
Start: 2024-02-14 | End: 2024-02-16

## 2024-02-14 RX ORDER — PANTOPRAZOLE SODIUM 40 MG/1
40 TABLET, DELAYED RELEASE ORAL
Qty: 30 TABLET | Refills: 3 | Status: SHIPPED | OUTPATIENT
Start: 2024-02-15

## 2024-02-14 RX ORDER — OXYCODONE HYDROCHLORIDE 5 MG/1
5 TABLET ORAL EVERY 6 HOURS PRN
Qty: 12 TABLET | Refills: 0 | Status: SHIPPED | OUTPATIENT
Start: 2024-02-14 | End: 2024-02-19

## 2024-02-14 RX ADMIN — OXYCODONE 5 MG: 5 TABLET ORAL at 08:46

## 2024-02-14 RX ADMIN — PANTOPRAZOLE SODIUM 40 MG: 40 TABLET, DELAYED RELEASE ORAL at 08:44

## 2024-02-14 RX ADMIN — SODIUM CHLORIDE: 9 INJECTION, SOLUTION INTRAVENOUS at 08:56

## 2024-02-14 RX ADMIN — NITROFURANTOIN MONOHYDRATE/MACROCRYSTALLINE 100 MG: 25; 75 CAPSULE ORAL at 08:44

## 2024-02-14 RX ADMIN — OXYCODONE 5 MG: 5 TABLET ORAL at 16:15

## 2024-02-14 RX ADMIN — POTASSIUM CHLORIDE 40 MEQ: 1500 TABLET, EXTENDED RELEASE ORAL at 08:44

## 2024-02-14 NOTE — CARE COORDINATION
Transitional planning    Spoke to patient about plan for discharge. Plan is home with family support. Can call someone for a ride.

## 2024-02-14 NOTE — PLAN OF CARE
Problem: Discharge Planning  Goal: Discharge to home or other facility with appropriate resources  2/14/2024 1433 by Antony Aguilar RN  Outcome: Completed  Flowsheets (Taken 2/14/2024 0800)  Discharge to home or other facility with appropriate resources: Identify barriers to discharge with patient and caregiver  2/14/2024 0432 by Araceli Celeste RN  Outcome: Progressing     Problem: Pain  Goal: Verbalizes/displays adequate comfort level or baseline comfort level  2/14/2024 1433 by Antony Aguilar RN  Outcome: Completed  2/14/2024 0432 by Araceli Celeste RN  Outcome: Progressing     Problem: Safety - Adult  Goal: Free from fall injury  2/14/2024 1433 by Antony Aguilar RN  Outcome: Completed  2/14/2024 0432 by Araceli Celeste RN  Outcome: Progressing     Problem: Chronic Conditions and Co-morbidities  Goal: Patient's chronic conditions and co-morbidity symptoms are monitored and maintained or improved  2/14/2024 1433 by Antony Aguilar RN  Outcome: Completed  Flowsheets (Taken 2/14/2024 0800)  Care Plan - Patient's Chronic Conditions and Co-Morbidity Symptoms are Monitored and Maintained or Improved: Monitor and assess patient's chronic conditions and comorbid symptoms for stability, deterioration, or improvement  2/14/2024 0432 by Araceli Celeste RN  Outcome: Progressing

## 2024-02-14 NOTE — DISCHARGE SUMMARY
[facility]  Office: [phone]  Crow Wallace DO, Maximiliano Sabillon DO, Albino Cadena DO, Amol Solano DO, Erlin Devries MD, Lor Fontana MD, Tiffanie Campos MD, Sherry Al MD,  Miguelito Theodore MD, Karina Blanco MD, Krupa Sanchez MD,  Kiesha Kaur DO, Paxton Rivas MD, Sriram Portillo MD, Agustín Wallace DO, Kourtney Malik MD,  Jose Guadalupe Moralez DO, Amber Sanchez MD, Diana Ballesteros MD, Ruthie Lacey MD, El Solares MD,  Portillo Cabral MD, Myra Montoya MD, Farzana Blackburn MD, Aron Cabrera MD, Lj Morales MD, Lidia Reid MD, Rock Mahajan DO, Ed Hayden DO, France Chen MD,  Elmer Dunlap MD, Shirley Waterhouse, CNP,  Toya Redman, CNP, Andrea Hugo, CNP,  April Espinal, DNP, Donna Lizama, CNP, Amaya Vaca, CNP, Kira Guillen CNP, Baylee Wilson, CNP, Sugar Bills, CNP, Kaia Finley PA-C, Meredith Kent PA-C, Mahogany Luna, CNP, Jyotsna Lal, CNP, Yana Viveros, CNS, Mago Mai, CNP, Anastasia Rudolph, CNP, Tracy Schwab, CNP           IN-PATIENT SERVICE  [facility]    Discharge Summary    Patient ID: Ruth Martínez  :  1990   MRN: [id number]     ACCOUNT:  [id number]   Patient's PCP: Indira Chavarria PA-C  Admit Date: 2024   Discharge Date: 2024    Discharge Physician: Maximiliano Sabillon DO     The patient was seen and examined on day of discharge and this discharge summary is in conjunction with any daily progress note from day of discharge.    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

## 2024-02-15 ENCOUNTER — TELEPHONE (OUTPATIENT)
Dept: GASTROENTEROLOGY | Age: 34
End: 2024-02-15

## 2024-02-15 NOTE — PROGRESS NOTES
Blood sugar checked, 420. Notified Baylee Wilson APN. Humalog 10units given as ordered. Will continue to monitor.  
[facility]    Admission Medication Reconciliation       The patient's list of current home medications has been reviewed.     Source(s) of information: dispense report    Based on information provided by the above source(s), I have updated the patient's home med list as described below.     Please review the ACTION REQUESTED section of this note below for any discrepancies on current hospital orders.      I changed or updated the following medications on the patient's home medication list:  Removed Venlafaxine 75mg (last fill for 150mg)     Added N/A     Adjusted   N/A   Other Notes Patient has not filled any prescriptions since 11/2023         Please feel free to call me with any questions about this encounter. Thank you.    Thank you  Nissa Ríos, PharmD, [facility]  Inpatient Clinical Pharmacist  [phone]      Electronically signed by Avi Li on 2/9/2024 at 1:41 PM     
CLINICAL PHARMACY NOTE: MEDS TO BEDS    Total # of Prescriptions Filled: 3   The following medications were delivered to the patient:  Nitrofurantoin  Pantoprazole  oxycodone    Additional Documentation:    
Inpatient Diabetes  Education     Type and Reason for Visit: Patient Education  Met with patient at bedside, she related that she has known about her dm type 2 dx and has compounding chronic pancreatitis / cyst.  Patient stated she has used insulin pens, she is aware of types of insulin, long and short acting. She has used and has a home BG Meter. She has general understanding of low and high BG. Eating patterns are as she can tolerate food, general awareness of CHO.        Verbally reviewed following information with patient  Survival skills : Blood glucose targets, hypo and hyperglycemia, importance of home blood glucose monitoring, heathy eating  plate method for CHO control portions, be active as recommended by health care providers, take medications oral and or insulin as directed.    Follow up care - she has open referral to ENDO - Dr Cj Mcarthur - writer call to office and patient needs to call to schedule an appt - writer did provide patient with office phone number for scheduling  on a yellow sticky note, Patient stated her sister helps her to get to appt and she she can call.     Patient stated she needs more insulin pens at discharge and update on the dose plan.    Education Folder provided with following support information:   _x__  Handout - What is diabetes? cornerstones4 care 2019  _x__  Handout - Eating Healthy for Life at every Meal / Plate method and grocery list  from www.[facility]abetes[facility] [place].org  _x__ Handout - How to Check Your Blood Sugar from www.Diabetes[facility] [place].org  _x__ Handout - Be safe with Needles teaching sheet - / www.Diabetes[facility] [place].org    _x__ Handout - How to Use an Insulin Pen - handout with QR code - Health wise Current as of Sept 22 2021  _x__ Emergency Insert sheet for your Vehicle - ADA Diabetes Emergencies   _x__ Diabetes ID card - ADA Low and High Blood Sugar and treatments  _x__ [facility] brochure/ contact card    Out patient diabetes 
[facility] Gastroenterology Progress Note    Ruth Martínez is a 33 y.o. female patient.  Hospitalization Day:1      Chief consult reason: Acute on chronic pancreatitis with pancreatic head mass       Subjective:   This is a 33 y.o. 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  Patient stated that she is feeling nauseous and had multiple episodes of bilious nonbloody vomiting since 2 days.  Patient states that she has been having 3-4 out of 10 cramping and burning abdominal pain in the epigastric region for the last 2 days which which remained the same.  No aggravating or relieving factors.  Patient has history of multiple episodes of acute pancreatitis in the past.       Patient was recently admitted for DKA and got discharged on 12/24/2023.  At that time GI was consulted for upper GI bleed with hematemesis.  GI bleed resolved, patient underwent imaging for chronic pancreatitis and MRCP was done, which showed cystic mass in the pancreatic head.  Patient was advised to have outpatient follow-up and further evaluation including EUS.     Patient denied any fever, constipation, diarrhea, dysuria, weight loss, lightheadedness.  In the ED, initial evaluation showed glucose of 380, anion gap of 18, bicarbonate 18, beta hydroxybutyrate 3.10, urinalysis suggestive of UTI and hematuria, VBG showing metabolic acidosis, lipase of 360.  Patient was found to be in DKA and acute pancreatitis.  LFT showed alk phos 127, otherwise unremarkable.  CBC showed WBC within normal limits and hemoconcentration with hemoglobin 
Patient was given discharge instructions, which were reviewed with the patient, and all questions were answered. Patient was discharged with all of their belongings and Meds to Beds. Patient was ambulated off the unit in a wheelchair.  
[facility]  Office: [phone]  Crow Wallace DO, Maximiliano Sabillon DO, Albino Cadena DO, Amol Solano DO, Erlin Devries MD, Lor Fontana MD, Tiffanie Campos MD, Sherry Al MD,  Miguelito Theodore MD, Karina Blanco MD, Krupa Sanchez MD,  Kiesha Kaur DO, Paxton Rivas MD, Sriram Portillo MD, Agustín Wallace DO, Kourtney Malik MD,  Jose Guadalupe Moralez DO, Amber Sanchez MD, Diana Ballesteros MD, Ruthie Lacey MD, El Solares MD,  Portillo Cabral MD, Myra Montoya MD, Farzana Blackburn MD, Aron Cabrera MD, Lj Morales MD, Lidia Reid MD, Rock Mahajan DO, Ed Hayden DO, France Chen MD,  Elmer Dunlap MD, Shirley Waterhouse, CNP,  Toya Redman CNP, Andrea Hugo, CNP,  April Espinal, LAUREN, Donna Lizama, CNP, Amaya Vaca, CNP, Kira Guillen CNP, Baylee Wilson, CNP, Sugar Bills, CNP, Kaia Finley PASusanC, Meredith Kent PASusanC, Jyotsna Lal, CNP, Yana Viveros, CNS, Mago Mai, CNP, Anastasia Rudolph, CNP, Tracy Schwab, CNP         [facility]   IN-PATIENT SERVICE   [facility]    Progress Note    2/12/2024    7:10 AM    Name:   Ruth Martínez  MRN:     [id number]     Acct:      [id number]   Room:   2025/2025-01  IP Day:  3  Admit Date:  2/8/2024  7:20 PM    PCP:   Indira Chavarria PA-C  Code Status:  Full Code    Subjective:     C/C:   Chief Complaint   Patient presents with    Hyperglycemia    Shaking     Interval History Status: not changed.     Patient seen and examined at bedside. She reports feeling better this morning but is still complaining of some abdominal discomfort. She has been tolerating a diet with no issue.    Brief History:     Ruth Martínez is a 33 y.o. Non- / non  female who presents with Hyperglycemia and is admitted to the hospital for the management of DKA, type 2, not at goal (HCC). Patient has a history significant for diabetes mellitus, MDD, CKD, and hx PE on Eliquis.     Patient reports she 
[facility]  Office: [phone]  Crow Wallace DO, Maximiliano Sabillon DO, Albino Cadena DO, Amol Solano DO, Erlin Devries MD, Lor Fontana MD, Tiffanie Campos MD, Sherry Al MD,  Miguelito Theodore MD, Karina Blanco MD, Krupa Sanchez MD,  Kiesha Kaur DO, Paxton Rivas MD, Sriram Portillo MD, Agustín Wallace DO, Kourtney Malik MD,  Jose Guadalupe Moralez DO, Amber Sanchez MD, Diana Ballesteros MD, Ruthie Lacey MD, El Solares MD,  Portillo Cabral MD, Myra Montoya MD, Farzana Blackburn MD, Aron Cabrera MD, Lj Morales MD, Lidia Reid MD, Rock Mahajan DO, Ed Hayden DO, France Chen MD,  Elmer Dunlap MD, Shirley Waterhouse, CNP,  Toya Redman CNP, Andrea Hugo, CNP,  April Espinal, LAUREN, Donna Lizama, CNP, Amaya Vaca, CNP, Kira Guillen CNP, Baylee Wilson CNP, Sugar Bills CNP, Kaia Finley PASusanC, WENDIE BullockC, Mahogany Luna, CNP, Jyotsna Lal, CNP, Yana Viveros, CNS, Mago Mai, OBEY, Anastasia Rudolph CNP, Tracy Schwab, CNP         IN-PATIENT SERVICE  [facility]    Progress Note    2/13/2024    5:29 PM    Name:   Ruth Martínez  MRN:     [id number]     Acct:      [id number]   Room:   2025/2025-01  IP Day:  4  Admit Date:  2/8/2024  7:20 PM    PCP:   Indira Chavarria PA-C  Code Status:  Full Code    Subjective:     C/C:   Chief Complaint   Patient presents with    Hyperglycemia    Shaking     Interval History Status:   Epigastric pain persists  Pain rated at 5/10  No emesis  No BM  Has been n.p.o. for EUS    Data Base Updates:  Calcium8.7  Xdcheh379tqjt/L   Potassium3.7mmol/L   Ulfkctfq504 High mmol/L   CO2  24mmol/L Anion Gap7 Low mmol/L     Lblhgxw141 High     Brief History:     As documented in the medical record:  \"Ruth Martínez is a 33 y.o. Non- / non  female who presents with Hyperglycemia and is admitted to the hospital for the management of DKA, type 2, not at goal (HCC). Patient has a history 
[facility]  Office: [phone]  Crow Wallace DO, Maximiliano Sabillon DO, Albino Cadena DO, Amol Solano DO, Erlin Devries MD, Lor Fontana MD, Tiffanie Campos MD, Sherry Al MD,  Miguelito Theodore MD, Karina Blanco MD, Krupa Sanchez MD,  Kiesha Kaur DO, Paxton Rivas MD, Sriram Portillo MD, Agustín Wallace DO, Kourtney Malik MD,  Jose Guadalupe Moralez DO, Amber Sanchez MD, Diana Ballesteros MD, Ruthie Lacey MD, El Solares MD,  Portillo Cabral MD, Myra Montoya MD, Farzana Blackburn MD, Aron Cabrera MD, Lj Morales MD, Lidia Reid MD, Rock Mahajan DO, Ed Hayden DO, France Chen MD,  Elmer Dunlap MD, Shirley Waterhouse, CNP,  Toya Redman CNP, Andrea Hugo, CNP,  April Espinal, LAUREN, Donna Lizama, CNP, Amaya Vaca, CNP, Kira Guillen CNP, Baylee Wilson, CNP, Sugar Bills, CNP, Kaia Finley, PA-C, Meredith Kent PASusanC, Jyotsna Lal, CNP, Yana Viveros, CNS, Mago Mai, CNP, Anastasia Rudolph, CNP, Tracy Schwab, CNP         [facility]   IN-PATIENT SERVICE   [facility]    Progress Note    2/11/2024    8:26 AM    Name:   Ruth Martínez  MRN:     [id number]     Acct:      [id number]   Room:   2025/2025-01  IP Day:  2  Admit Date:  2/8/2024  7:20 PM    PCP:   Indira Chavarria PA-C  Code Status:  Full Code    Subjective:     C/C:   Chief Complaint   Patient presents with    Hyperglycemia    Shaking     Interval History Status: not changed.     Patient seen and examined at bedside. She reports feeling better this morning but is still complaining of some abdominal discomfort. She has been tolerating a diet.     Brief History:     Ruth Martínez is a 33 y.o. Non- / non  female who presents with Hyperglycemia and is admitted to the hospital for the management of DKA, type 2, not at goal (HCC). Patient has a history significant for diabetes mellitus, MDD, CKD, and hx PE on Eliquis.     Patient reports she has had 
[facility]  Office: [phone]  Crow Wallace DO, Maximiliano Sabillon DO, Albino Cadena DO, Amol Solano DO, Erlin Devries MD, Lor Fontana MD, Tiffanie Campos MD, Sherry Al MD,  Miguelito Theodore MD, Karina Blanco MD, Krupa Sanchez MD,  Kiesha Kaur DO, Paxton Rivas MD, Sriram Portillo MD, Agustín Wallace DO, Kourtney Malik MD,  Jose Guadalupe Moralez DO, Amber Sanchez MD, Diana Ballesteros MD, Ruthie Lacey MD, El Solares MD,  Portillo Cabral MD, Myra Montoya MD, Farzana Blackburn MD, Aron Cabrera MD, Lj Morales MD, Lidia Reid MD, Rock Mahajan DO, Ed Hayden DO, France Chen MD,  Elmer Dunlap MD, Shirley Waterhouse, CNP,  Toya Redman CNP, Andrea Hugo, CNP,  April Espinal, DNP, Donna Lizama, CNP, Amaya Vaca, CNP, Kira Guillen CNP, Baylee Wilson CNP, Sugar Bills, CNP, Kaia Finley PA-C, Meredith Kent PASusanC, Mahogany Luna, CNP, Jyotsna Lal, CNP, Yana Viveros, CNS, Mago Mai, CNP, Anastasia Rudolph CNP, Tracy Schwab, CNP         IN-PATIENT SERVICE  [facility]    Progress Note    2/14/2024    11:49 AM    Name:   Ruth Martínez  MRN:     [id number]     Acct:      [id number]   Room:   2025/2025-01  IP Day:  5  Admit Date:  2/8/2024  7:20 PM    PCP:   Indira Chavarria PA-C  Code Status:  Full Code    Subjective:     C/C:   Chief Complaint   Patient presents with    Hyperglycemia    Shaking     Interval History Status:   Improving  Looks comfortable  Pain controlled  Doing okay with diet  EUS and biopsy    Data Base Updates:  WBC4.3k/uL RBC4.30m/uL Ohvwltphsw86.5    Calcium, Ionized1.20  Awzqgr652ppzh/L   Potassium3.3 Low mmol/L   Bnjfzeze486 High mmol/L MQ751oxre/L   Anion Gap6 Low mmol/L     Hxznzvd39    Nxvzdp50    CEA FLUID            262.1 NG/ML    Brief History:     As documented in the medical record:  \"Ruth Martínez is a 33 y.o. Non- / non  female who presents with Hyperglycemia and is admitted to the 
Emergencies   _x__ Diabetes ID card - ADA Low and High Blood Sugar and treatments  _x__ [facility] Education brochure/ contact card    Would recommend follow -up education at outpatient diabetes education at [facility]. An ordered is needed for this service and can be placed via EHR. Discharge Navigator --- Med Reconciliation -- New orders for discharge tab -- search diabetic ed - choose REF 20 -- review and sign     Encouraged follow up as out patient with diabetes education services and encouraged follow up with a PCP.   Aunt and sister are working on her insurance ([place]) but states she has everything for her diabetes.    Patient verbalizes understanding  of survival skills education (would benefit from reinforcement mark, if changes are made to regimen)          Alanis Chaudhari RD, LD  
   Cancer Sister         breast    Heart Disease Brother     Other Brother         blood clots       Vitals:  /84   Pulse 94   Temp 98.4 °F (36.9 °C) (Oral)   Resp 18   Ht 1.702 m (5' 7\")   Wt 104.5 kg (230 lb 6.1 oz)   SpO2 98%   BMI 36.08 kg/m²   Temp (24hrs), Av.3 °F (36.8 °C), Min:98 °F (36.7 °C), Max:98.8 °F (37.1 °C)    Recent Labs     02/10/24  0053 02/10/24  0440 02/10/24  0631 02/10/24  0643   POCGLU 381* 420* 302* 288*       I/O (24Hr):    Intake/Output Summary (Last 24 hours) at 2/10/2024 0912  Last data filed at 2024 1823  Gross per 24 hour   Intake 240 ml   Output 550 ml   Net -310 ml       Labs:  Hematology:  Recent Labs     24  1217 02/10/24  0348   WBC 8.0  --  7.4   RBC 5.30*  --  4.59   HGB 15.7*  --  13.4   HCT 49.2*  --  42.7   MCV 92.8  --  93.0   MCH 29.6  --  29.2   MCHC 31.9  --  31.4   RDW 12.4  --  12.6     --  224   MPV 12.4  --  12.4   INR  --  1.0  --      Chemistry:  Recent Labs     24  03224  1644 02/10/24  0348    138 135   K 3.2* 4.1 4.2    105 105   CO2 22 21 19*   GLUCOSE 96 116* 435*   BUN 9 6 8   CREATININE 0.7 0.7 0.7   MG 2.0  --  2.0   ANIONGAP 12 12 11   LABGLOM >60 >60 >60   CALCIUM 8.7 8.9 9.0   PHOS 2.7  --  3.3     Recent Labs     24  2234 24  1633 02/09/24  1953 02/10/24  0053 02/10/24  0440 02/10/24  0631 02/10/24  0643   PROT 7.8  --   --   --   --   --   --   --    LABALBU 3.7  --   --   --   --   --   --   --    AST 14  --   --   --   --   --   --   --    ALT 6  --   --   --   --   --   --   --    ALKPHOS 127*  --   --   --   --   --   --   --    BILITOT 0.4  --   --   --   --   --   --   --    LIPASE 360*  --   --   --   --   --   --   --    POCGLU  --    < > 127* 247* 381* 420* 302* 288*    < > = values in this interval not displayed.     ABG:  Lab Results   Component Value Date/Time    FIO2 INFORMATION NOT PROVIDED 2024 11:42 PM     Lab Results   Component 
 No results found for: \"\"  Ca 19-9:     Lab Results   Component Value Date/Time     69 02/10/2024 06:17 PM     AFP: No results found for: \"AFP\"    Lactic acid:No results for input(s): \"LACTACIDWB\" in the last 72 hours.          Principal Problem:    DKA, type 2, not at goal (HCC)  Active Problems:    Benign essential hypertension    Type 2 diabetes mellitus with hyperglycemia (HCC)    Recurrent major depressive disorder (HCC)    CKD (chronic kidney disease)    History of pulmonary embolism    Acute cystitis    Benign cystic neoplasm of exocrine pancreas    Acute biliary pancreatitis with uninfected necrosis    Hyperglycemia  Resolved Problems:    * No resolved hospital problems. *       GI Impression:  Acute on chronic pancreatitis: Patient had CT abdomen done 2 months ago which showed chronic pancreatitis with cystic mass.   CA 19-9 69        Recommendations and plan:    We will plan for EUS on Monday.    Please hold AM Lovenox  Continue IV fluids, analgesics and as needed antiemetics  Recommend low-fat and low acid diet. NPO [place]  Protonix IV for acid suppression to reduce stimulation of pancreas.  Patient also has history of GERD.  Can switch to p.o. Protonix once patient starts eating.  This plan was formulated in collaboration with Dr. Maura MD  Please feel free to contact us with any questions or concerns    Time spent reviewing chart, seeing patient, documentation and coordination of care for the above conditions, and discussing with attending: Around 15 minutes.    This plan was formulated in collaboration with Dr. Lorenzo.  Please feel free to contact me with any questions or concerns.   Thank you for allowing me to participate in the care of your patient.      Catrachito Song, APRN - CNP on 2/11/2024 at 6:57 AM   [facility]      Attending Physician Statement  I have discussed the care of Ruth Martínez and   I have examined the patient myselft independently, and taken ros and 
the encounter with the nurse practitioner/resident.    I agree with the assessment, plan and orders as documented by the above health care provider     More than 50% of the time was spent taking care of this patient in addition to the nurse practitioner time.  That also included history taking follow-up physical examination and review of system, and formulating the plan and the orders          hysical Exam  Blood pressure 116/66, pulse 83, temperature 98.2 °F (36.8 °C), temperature source Oral, resp. rate 18, height 1.702 m (5' 7\"), weight 104.3 kg (230 lb), SpO2 97 %, unknown if currently breastfeeding.     Abdomen is soft no tenderness positive bowel sounds    Additional :    Plan for EUS tomorrow  Pancreatitis resolved  Patient can be discharged after the EUS if there is no complication    This note was created with the assistance of a speech-recognition program.  Although the intention is to generate a document that actually reflects the content of the visit, no guarantees can be provided that every mistake has been identified and corrected by editing.   Electronically signed by Kiersten Lorenzo MD

## 2024-02-16 LAB — SURGICAL PATHOLOGY REPORT: NORMAL

## 2024-02-20 DIAGNOSIS — K31.7 POLYP OF DUODENUM: Primary | ICD-10-CM

## 2024-02-22 NOTE — TELEPHONE ENCOUNTER
"OT Treatment: This treatment completed by ANNITA Kramer/ZULAY with LUIS FERNANDO Quintanilla/S. Upon arrival, patient finishing dressing and brushing teeth with Sup A from family member. Patient reported 5/10 back pain (osteoporsis). Patient participated in functional reaching and mobility to increase endurance in daily activities. Patient used FWW and reacher SBA to collect 10 loop rings on 1st floor in areas included: living room, kitchen, dining room, and bathroom. After retrieving each ring, patient instructed to place rings onto cone in living room by recliner. Patient given >2 verbal and tactile safety cues using FWW with both hands and positioning self closer before reaching. Patient required no rest breaks to complete activity. Patient stated, \"I would grade the activity as medium\" in level of difficulty because of distance. Continue POC." Forms faxed.    Carolann ESTRELLA, Patient Care

## 2024-02-24 ENCOUNTER — HEALTH MAINTENANCE LETTER (OUTPATIENT)
Age: 34
End: 2024-02-24

## 2024-02-26 NOTE — PROGRESS NOTES
This is a recent snapshot of the patient's [facility] medical record.  For current drug dose and complete information and questions, call [phone] or In Basket pool, [facility] home [facility] ([id number])  CSN Number:  [id number]

## 2024-03-01 ENCOUNTER — OFFICE VISIT (OUTPATIENT)
Dept: GASTROENTEROLOGY | Age: 34
End: 2024-03-01
Payer: MEDICARE

## 2024-03-01 VITALS
HEIGHT: 67 IN | TEMPERATURE: 97.4 F | SYSTOLIC BLOOD PRESSURE: 121 MMHG | OXYGEN SATURATION: 100 % | DIASTOLIC BLOOD PRESSURE: 86 MMHG | HEART RATE: 86 BPM | WEIGHT: 229.4 LBS | BODY MASS INDEX: 36 KG/M2

## 2024-03-01 DIAGNOSIS — K86.0 ALCOHOL-INDUCED CHRONIC PANCREATITIS (HCC): Primary | ICD-10-CM

## 2024-03-01 DIAGNOSIS — E66.9 OBESITY (BMI 30-39.9): ICD-10-CM

## 2024-03-01 DIAGNOSIS — F10.20 ALCOHOLISM (HCC): ICD-10-CM

## 2024-03-01 DIAGNOSIS — K86.2 PANCREATIC CYST: ICD-10-CM

## 2024-03-01 DIAGNOSIS — Z87.891 HISTORY OF CIGARETTE SMOKING: ICD-10-CM

## 2024-03-01 PROCEDURE — 99214 OFFICE O/P EST MOD 30 MIN: CPT | Performed by: INTERNAL MEDICINE

## 2024-03-01 PROCEDURE — 3079F DIAST BP 80-89 MM HG: CPT | Performed by: INTERNAL MEDICINE

## 2024-03-01 PROCEDURE — 3074F SYST BP LT 130 MM HG: CPT | Performed by: INTERNAL MEDICINE

## 2024-03-01 PROCEDURE — G8417 CALC BMI ABV UP PARAM F/U: HCPCS | Performed by: INTERNAL MEDICINE

## 2024-03-01 PROCEDURE — 1111F DSCHRG MED/CURRENT MED MERGE: CPT | Performed by: INTERNAL MEDICINE

## 2024-03-01 PROCEDURE — G8484 FLU IMMUNIZE NO ADMIN: HCPCS | Performed by: INTERNAL MEDICINE

## 2024-03-01 PROCEDURE — 1036F TOBACCO NON-USER: CPT | Performed by: INTERNAL MEDICINE

## 2024-03-01 PROCEDURE — G8427 DOCREV CUR MEDS BY ELIG CLIN: HCPCS | Performed by: INTERNAL MEDICINE

## 2024-03-01 ASSESSMENT — ENCOUNTER SYMPTOMS
NAUSEA: 0
CONSTIPATION: 0
BACK PAIN: 1
BLOOD IN STOOL: 0
RECTAL PAIN: 0
ABDOMINAL DISTENTION: 0
COUGH: 0
ANAL BLEEDING: 0
ABDOMINAL PAIN: 1
DIARRHEA: 0
VOMITING: 0
TROUBLE SWALLOWING: 0

## 2024-03-01 NOTE — PROGRESS NOTES
CBC; Future  -     Lipid Panel; Future  -     Immunoglobulin G Subclasses; Future  -     Lipase; Future    Pancreatic cyst  -     Pancreatic Elastase, Fecal; Future  -     MRI ABDOMEN W WO CONTRAST MRCP; Future  -     Comprehensive Metabolic Panel with Bilirubin; Future  -     CBC; Future  -     Lipid Panel; Future  -     Immunoglobulin G Subclasses; Future    Alcoholism (HCC)  -     Pancreatic Elastase, Fecal; Future  -     MRI ABDOMEN W WO CONTRAST MRCP; Future  -     Comprehensive Metabolic Panel with Bilirubin; Future  -     CBC; Future  -     Lipid Panel; Future  -     Immunoglobulin G Subclasses; Future    Obesity (BMI 30-39.9)    History of cigarette smoking             RTC:3-6 months    Additional comments:          Thank you for allowing me to participate in the care of Ms. Martínez. For any further questions please do not hesitate to contact me.      I have reviewed and agree with the MA/LPN ROS please refer to their documentation from today's encounter on a separate note.     Kush Dowd MD  Gastroenterology & Hepatology  Office #: [phone]          this note is created with the assistance of a speech recognition program.  While intending to generate a document that actually reflects the content of the visit, the document can still have some errors including those of syntax and sound a like substitutions which may escape proof reading.  It such instances, actual meaning can be extrapolated by contextual diversion.

## 2024-03-05 ENCOUNTER — TELEPHONE (OUTPATIENT)
Dept: GASTROENTEROLOGY | Age: 34
End: 2024-03-05

## 2024-03-05 NOTE — TELEPHONE ENCOUNTER
----- Message from Svetlana Gray MA sent at 2/20/2024  3:24 PM EST -----  EGD ordered.  Please call patient to schedule.    Thank you,    Svetlana  ----- Message -----  From: Maria Fernanda Fry MD  Sent: 2/20/2024   2:15 PM EST  To: Svetlana Gray MA    She needs upper endoscopy for removal of duodenal polyp  ----- Message -----  From: Chetan Mabry Incoming Lab Results From [facility]  Sent: 2/16/2024   8:39 AM EST  To: Maria Fernanda Fry MD

## 2024-03-08 ENCOUNTER — HOSPITAL ENCOUNTER (EMERGENCY)
Age: 34
Discharge: HOME | End: 2024-03-08
Attending: EMERGENCY MEDICINE
Payer: MEDICARE

## 2024-03-08 VITALS
DIASTOLIC BLOOD PRESSURE: 84 MMHG | TEMPERATURE: 97.4 F | SYSTOLIC BLOOD PRESSURE: 118 MMHG | RESPIRATION RATE: 18 BRPM | HEART RATE: 86 BPM | OXYGEN SATURATION: 98 %

## 2024-03-08 DIAGNOSIS — N76.0 BV (BACTERIAL VAGINOSIS): Primary | ICD-10-CM

## 2024-03-08 DIAGNOSIS — B96.89 BV (BACTERIAL VAGINOSIS): Primary | ICD-10-CM

## 2024-03-08 DIAGNOSIS — H04.302 DACRYOCYSTITIS OF LEFT LACRIMAL SAC: ICD-10-CM

## 2024-03-08 LAB
BACTERIA URNS QL MICRO: ABNORMAL
BILIRUB UR QL STRIP: NEGATIVE
C TRACH DNA SPEC QL PROBE+SIG AMP: NORMAL
CANDIDA SPECIES: NEGATIVE
CASTS #/AREA URNS LPF: ABNORMAL /LPF (ref 0–8)
CLARITY UR: ABNORMAL
COLOR UR: YELLOW
EPI CELLS #/AREA URNS HPF: ABNORMAL /HPF (ref 0–5)
GARDNERELLA VAGINALIS: POSITIVE
GLUCOSE UR STRIP-MCNC: NEGATIVE MG/DL
HCG UR QL: NEGATIVE
HGB UR QL STRIP.AUTO: ABNORMAL
KETONES UR STRIP-MCNC: ABNORMAL MG/DL
LEUKOCYTE ESTERASE UR QL STRIP: NEGATIVE
N GONORRHOEA DNA SPEC QL PROBE+SIG AMP: NORMAL
NITRITE UR QL STRIP: NEGATIVE
PH UR STRIP: 7 [PH] (ref 5–8)
PROT UR STRIP-MCNC: ABNORMAL MG/DL
RBC #/AREA URNS HPF: ABNORMAL /HPF (ref 0–4)
SOURCE: ABNORMAL
SP GR UR STRIP: 1.02 (ref 1–1.03)
SPECIMEN DESCRIPTION: NORMAL
TRICHOMONAS: NEGATIVE
UROBILINOGEN UR STRIP-ACNC: NORMAL EU/DL (ref 0–1)
WBC #/AREA URNS HPF: ABNORMAL /HPF (ref 0–5)

## 2024-03-08 PROCEDURE — 81025 URINE PREGNANCY TEST: CPT

## 2024-03-08 PROCEDURE — 87480 CANDIDA DNA DIR PROBE: CPT

## 2024-03-08 PROCEDURE — 87661 TRICHOMONAS VAGINALIS AMPLIF: CPT

## 2024-03-08 PROCEDURE — 87591 N.GONORRHOEAE DNA AMP PROB: CPT

## 2024-03-08 PROCEDURE — 87510 GARDNER VAG DNA DIR PROBE: CPT

## 2024-03-08 PROCEDURE — 87660 TRICHOMONAS VAGIN DIR PROBE: CPT

## 2024-03-08 PROCEDURE — 99283 EMERGENCY DEPT VISIT LOW MDM: CPT

## 2024-03-08 PROCEDURE — 6370000000 HC RX 637 (ALT 250 FOR IP): Performed by: STUDENT IN AN ORGANIZED HEALTH CARE EDUCATION/TRAINING PROGRAM

## 2024-03-08 PROCEDURE — 87491 CHLMYD TRACH DNA AMP PROBE: CPT

## 2024-03-08 PROCEDURE — 81001 URINALYSIS AUTO W/SCOPE: CPT

## 2024-03-08 RX ORDER — METRONIDAZOLE 500 MG/1
500 TABLET ORAL 2 TIMES DAILY
Qty: 14 TABLET | Refills: 0 | Status: SHIPPED | OUTPATIENT
Start: 2024-03-08 | End: 2024-03-15

## 2024-03-08 RX ORDER — METRONIDAZOLE 500 MG/1
500 TABLET ORAL ONCE
Status: COMPLETED | OUTPATIENT
Start: 2024-03-08 | End: 2024-03-08

## 2024-03-08 RX ADMIN — METRONIDAZOLE 500 MG: 500 TABLET ORAL at 22:48

## 2024-03-08 RX ADMIN — FLUORESCEIN SODIUM 1 MG: 1 STRIP OPHTHALMIC at 22:03

## 2024-03-08 NOTE — TELEPHONE ENCOUNTER
Returned patients call to get her scheduled for her EGD lft vm for her to call the office back. KASSIE

## 2024-03-09 LAB
SOURCE: NORMAL
TRICHOMONAS VAGINALI, MOLECULAR: NEGATIVE

## 2024-03-09 NOTE — ED PROVIDER NOTES
[facility] ED  Emergency Department Encounter  Emergency Medicine Resident     Pt Name:Ruth Martínez  MRN: [id number]  Birthdate 1990  Date of evaluation: 3/8/24  PCP:  Indira Chavarria PA-C  Note Started: 9:19 PM EST      CHIEF COMPLAINT       Chief Complaint   Patient presents with    Eye Pain     Pain and swelling to left eye    Vaginal Discharge       HISTORY OF PRESENT ILLNESS  (Location/Symptom, Timing/Onset, Context/Setting, Quality, Duration, Modifying Factors, Severity.)      Ruth Martínez is a 33 y.o. female who presents with swelling and pain to the left eye in addition to vaginal discharge.  Patient reports that she has been having swelling of the left inner aspect of her eye in addition to pain to the same for the past 2 days.  She reports she has been using ice packs and some eyedrops her sister gave her with minimal improvement.  She reports that it does not feel like anything is stuck in her eye when she denies any changes in her vision or pain with motion of her eye.  She denies any fevers or chills, nausea or vomiting.  She reports that she has been having vaginal discharge for approximately the past 2 days as well.  She reports it is dark and ayad colored.  She reports that she was recently admitted due to pancreatitis but denies any abdominal pain other than her chronic abdominal pain at this time.  She denies any lower abdominal pain.  She reports some mild dysuria.  She reports it burns mildly when she pees.  She does not believe she could be pregnant and is not sexually active at this time.    PAST MEDICAL / SURGICAL / SOCIAL / FAMILY HISTORY      has a past medical history of Depression, Hypertension, Obesity, and Postpartum depression.     has a past surgical history that includes  section; knee surgery (Right, [id number]); Esophagogastroduodenoscopy (2024); Upper gastrointestinal endoscopy (N/A, 2024); and Upper gastrointestinal endoscopy  Maximiliano Sabillon S, DO   insulin glargine (BASAGLAR KWIKPEN) 100 UNIT/ML injection pen Inject 30 Units into the skin nightly 12/24/23   Garland Ruth MD   insulin lispro, 1 Unit Dial, (HUMALOG KWIKPEN) 100 UNIT/ML SOPN Inject 2 Units into the skin 3 times daily (before meals) 12/24/23   Garland Ruth MD   glucose monitoring kit 1 kit by Does not apply route daily 12/24/23   Garland Ruth MD   Lancets MISC 1 each by Does not apply route daily 12/24/23   Garland Ruth MD   Insulin Pen Needle 32G X 4 MM MISC 1 each by Does not apply route daily 12/24/23   Garland Ruth MD   apixaban (ELIQUIS) 5 MG TABS tablet Take 1 tablet by mouth 2 times daily  Patient not taking: Reported on 2/9/2024    ProviderMouna MD   insulin aspart (NOVOLOG FLEXPEN) 100 UNIT/ML injection pen     Provider, MD Mouna       REVIEW OF SYSTEMS       See HPI    PHYSICAL EXAM      INITIAL VITALS:   /84   Pulse 86   Temp 97.4 °F (36.3 °C) (Oral)   Resp 18   SpO2 98%     Physical Exam  Vitals reviewed.   Constitutional:       General: She is not in acute distress.     Appearance: She is not ill-appearing.   HENT:      Head: Normocephalic and atraumatic.      Right Ear: External ear normal.      Left Ear: External ear normal.      Nose: Nose normal.      Mouth/Throat:      Mouth: Mucous membranes are moist.   Eyes:      General:         Right eye: No foreign body or discharge.         Left eye: No foreign body or discharge.      Extraocular Movements: Extraocular movements intact.      Conjunctiva/sclera: Conjunctivae normal.      Pupils: Pupils are equal, round, and reactive to light.     Cardiovascular:      Rate and Rhythm: Normal rate and regular rhythm.      Heart sounds: Normal heart sounds.   Pulmonary:      Effort: Pulmonary effort is normal.      Breath sounds: Normal breath sounds.   Abdominal:      General: Abdomen is flat.      Tenderness: There is no abdominal tenderness.

## 2024-03-09 NOTE — DISCHARGE INSTRUCTIONS
The swelling in your eye is likely caused from a blocked tear duct.  We recommend that you use warm compresses regularly to help reduce the inflammation and clear your blocked duct.    You are positive for bacterial vaginitis which is not a sexually transmitted infection but we have prescribed you an antibiotic which we advised that you take as prescribed.  Please take this antibiotic with food as it can cause some stomach upset and nausea.  We have additionally tested for sexually transmitted infections but this testing will not come back for the next 2 days.  Please check your MyChart for results, we will call you if it comes back positive.    Please return to the ED if develop worsening vaginal bleeding, discharge, abdominal pain, nausea, vomiting, fever, chills, changes in your vision, worsening swelling of your eye, pain with motion of your eye or for any other concern.

## 2024-03-09 NOTE — ED NOTES
Pt to ED 28 via triage c/o left eye pain and brown vaginal discharge. Pt states that she tried using eye drops and an ice pack PTA and that made the pain worse. Pt denies any contact use or recent trauma to her eye. Pt arrives ambulatory to ED room with a steady gait. Pt RR are even and non-labored. Pt vitals are complete, resident is at the bedside to assess, plan of care ongoing.

## 2024-03-09 NOTE — ED PROVIDER NOTES
[facility]     Emergency Department     Faculty Attestation    I performed a history and physical examination of the patient and discussed management with the resident. I reviewed the resident’s note and agree with the documented findings and plan of care. Any areas of disagreement are noted on the chart. I was personally present for the key portions of any procedures. I have documented in the chart those procedures where I was not present during the key portions. I have reviewed the emergency nurses triage note. I agree with the chief complaint, past medical history, past surgical history, allergies, medications, social and family history as documented unless otherwise noted below. Documentation of the HPI, Physical Exam and Medical Decision Making performed by medical students or scribes is based on my personal performance of the HPI, PE and MDM. For Physician Assistant/ Nurse Practitioner cases/documentation I have personally evaluated this patient and have completed at least one if not all key elements of the E/M (history, physical exam, and MDM). Additional findings are as noted.    Vital signs:   Vitals:    03/08/24 1947   BP: 118/84   Pulse: 86   Resp: 20   Temp: 97.4 °F (36.3 °C)   SpO2: 98%      Mild left blepharitis.  No conjunctival irritation.  No drainage.  No abdominal discomfort with palpation.  No rebound or guarding.  Plan for self swab.            Cinthya Martinez M.D,  Attending Emergency  Physician            Cinthya Martinez MD  03/08/24 0359

## 2024-03-11 LAB
C TRACH DNA SPEC QL PROBE+SIG AMP: NEGATIVE
N GONORRHOEA DNA SPEC QL PROBE+SIG AMP: NEGATIVE
SPECIMEN DESCRIPTION: NORMAL

## 2024-04-19 DIAGNOSIS — N17.9 ACUTE KIDNEY INJURY (H): Primary | ICD-10-CM

## 2024-04-26 ENCOUNTER — HOSPITAL ENCOUNTER (INPATIENT)
Age: 34
LOS: 4 days | Discharge: HOME | DRG: 637 | End: 2024-04-30
Attending: EMERGENCY MEDICINE | Admitting: INTERNAL MEDICINE
Payer: MEDICARE

## 2024-04-26 DIAGNOSIS — K85.11 ACUTE BILIARY PANCREATITIS WITH UNINFECTED NECROSIS: ICD-10-CM

## 2024-04-26 DIAGNOSIS — E11.10 DIABETIC KETOACIDOSIS WITHOUT COMA ASSOCIATED WITH TYPE 2 DIABETES MELLITUS (HCC): Primary | ICD-10-CM

## 2024-04-26 DIAGNOSIS — K85.20 ALCOHOL-INDUCED ACUTE PANCREATITIS, UNSPECIFIED COMPLICATION STATUS: ICD-10-CM

## 2024-04-26 LAB
ALBUMIN SERPL-MCNC: 3.8 G/DL (ref 3.5–5.2)
ALBUMIN/GLOB SERPL: 1 {RATIO} (ref 1–2.5)
ALP SERPL-CCNC: 118 U/L (ref 35–104)
ALT SERPL-CCNC: <5 U/L (ref 10–35)
ANION GAP SERPL CALCULATED.3IONS-SCNC: 15 MMOL/L (ref 9–16)
ANION GAP SERPL CALCULATED.3IONS-SCNC: 20 MMOL/L (ref 9–16)
AST SERPL-CCNC: 18 U/L (ref 10–35)
B-OH-BUTYR SERPL-MCNC: 6.16 MMOL/L (ref 0.02–0.27)
BACTERIA URNS QL MICRO: ABNORMAL
BASOPHILS # BLD: 0.04 K/UL (ref 0–0.2)
BASOPHILS NFR BLD: 1 % (ref 0–2)
BILIRUB SERPL-MCNC: 0.3 MG/DL (ref 0–1.2)
BILIRUB UR QL STRIP: NEGATIVE
BUN SERPL-MCNC: 6 MG/DL (ref 6–20)
BUN SERPL-MCNC: 6 MG/DL (ref 6–20)
CALCIUM SERPL-MCNC: 8.5 MG/DL (ref 8.6–10.4)
CALCIUM SERPL-MCNC: 9.4 MG/DL (ref 8.6–10.4)
CASTS #/AREA URNS LPF: ABNORMAL /LPF (ref 0–8)
CHLORIDE SERPL-SCNC: 102 MMOL/L (ref 98–107)
CHLORIDE SERPL-SCNC: 109 MMOL/L (ref 98–107)
CLARITY UR: CLEAR
CO2 SERPL-SCNC: 13 MMOL/L (ref 20–31)
CO2 SERPL-SCNC: 15 MMOL/L (ref 20–31)
COLOR UR: YELLOW
CREAT SERPL-MCNC: 0.8 MG/DL (ref 0.5–0.9)
CREAT SERPL-MCNC: 1 MG/DL (ref 0.5–0.9)
D DIMER PPP FEU-MCNC: 0.3 UG/ML FEU (ref 0–0.57)
EOSINOPHIL # BLD: 0.04 K/UL (ref 0–0.44)
EOSINOPHILS RELATIVE PERCENT: 1 % (ref 1–4)
EPI CELLS #/AREA URNS HPF: ABNORMAL /HPF (ref 0–5)
ERYTHROCYTE [DISTWIDTH] IN BLOOD BY AUTOMATED COUNT: 13.6 % (ref 11.8–14.4)
EST. AVERAGE GLUCOSE BLD GHB EST-MCNC: 289 MG/DL
GFR SERPL CREATININE-BSD FRML MDRD: 77 ML/MIN/1.73M2
GFR SERPL CREATININE-BSD FRML MDRD: >90 ML/MIN/1.73M2
GLUCOSE BLD-MCNC: 152 MG/DL (ref 65–105)
GLUCOSE BLD-MCNC: 196 MG/DL (ref 65–105)
GLUCOSE BLD-MCNC: 252 MG/DL (ref 65–105)
GLUCOSE BLD-MCNC: 286 MG/DL (ref 65–105)
GLUCOSE BLD-MCNC: 339 MG/DL (ref 65–105)
GLUCOSE SERPL-MCNC: 164 MG/DL (ref 74–99)
GLUCOSE SERPL-MCNC: 361 MG/DL (ref 74–99)
GLUCOSE UR STRIP-MCNC: ABNORMAL MG/DL
HBA1C MFR BLD: 11.7 % (ref 4–6)
HCG UR QL: NEGATIVE
HCT VFR BLD AUTO: 46.1 % (ref 36.3–47.1)
HGB BLD-MCNC: 14.1 G/DL (ref 11.9–15.1)
HGB UR QL STRIP.AUTO: NEGATIVE
IMM GRANULOCYTES # BLD AUTO: <0.03 K/UL (ref 0–0.3)
IMM GRANULOCYTES NFR BLD: 0 %
KETONES UR STRIP-MCNC: ABNORMAL MG/DL
LEUKOCYTE ESTERASE UR QL STRIP: NEGATIVE
LIPASE SERPL-CCNC: 258 U/L (ref 13–60)
LYMPHOCYTES NFR BLD: 2.78 K/UL (ref 1.1–3.7)
LYMPHOCYTES RELATIVE PERCENT: 39 % (ref 24–43)
MAGNESIUM SERPL-MCNC: 1.6 MG/DL (ref 1.6–2.6)
MCH RBC QN AUTO: 29.9 PG (ref 25.2–33.5)
MCHC RBC AUTO-ENTMCNC: 30.6 G/DL (ref 28.4–34.8)
MCV RBC AUTO: 97.7 FL (ref 82.6–102.9)
MONOCYTES NFR BLD: 0.45 K/UL (ref 0.1–1.2)
MONOCYTES NFR BLD: 6 % (ref 3–12)
NEUTROPHILS NFR BLD: 53 % (ref 36–65)
NEUTS SEG NFR BLD: 3.84 K/UL (ref 1.5–8.1)
NITRITE UR QL STRIP: NEGATIVE
NRBC BLD-RTO: 0 PER 100 WBC
PH UR STRIP: 5.5 [PH] (ref 5–8)
PHOSPHATE SERPL-MCNC: 1.9 MG/DL (ref 2.5–4.5)
PLATELET # BLD AUTO: 227 K/UL (ref 138–453)
PMV BLD AUTO: 12 FL (ref 8.1–13.5)
POTASSIUM SERPL-SCNC: 3.3 MMOL/L (ref 3.7–5.3)
POTASSIUM SERPL-SCNC: 4.2 MMOL/L (ref 3.7–5.3)
PROT SERPL-MCNC: 7.3 G/DL (ref 6.6–8.7)
PROT UR STRIP-MCNC: ABNORMAL MG/DL
RBC # BLD AUTO: 4.72 M/UL (ref 3.95–5.11)
RBC #/AREA URNS HPF: ABNORMAL /HPF (ref 0–4)
SODIUM SERPL-SCNC: 135 MMOL/L (ref 136–145)
SODIUM SERPL-SCNC: 139 MMOL/L (ref 136–145)
SP GR UR STRIP: 1.04 (ref 1–1.03)
TROPONIN I SERPL HS-MCNC: <6 NG/L (ref 0–14)
TSH SERPL DL<=0.05 MIU/L-ACNC: 0.86 UIU/ML (ref 0.27–4.2)
UROBILINOGEN UR STRIP-ACNC: NORMAL EU/DL (ref 0–1)
WBC #/AREA URNS HPF: ABNORMAL /HPF (ref 0–5)
WBC OTHER # BLD: 7.2 K/UL (ref 3.5–11.3)

## 2024-04-26 PROCEDURE — 6360000002 HC RX W HCPCS: Performed by: NURSE PRACTITIONER

## 2024-04-26 PROCEDURE — 83690 ASSAY OF LIPASE: CPT

## 2024-04-26 PROCEDURE — 85379 FIBRIN DEGRADATION QUANT: CPT

## 2024-04-26 PROCEDURE — 80048 BASIC METABOLIC PNL TOTAL CA: CPT

## 2024-04-26 PROCEDURE — 96374 THER/PROPH/DIAG INJ IV PUSH: CPT

## 2024-04-26 PROCEDURE — 85025 COMPLETE CBC W/AUTO DIFF WBC: CPT

## 2024-04-26 PROCEDURE — 2500000003 HC RX 250 WO HCPCS

## 2024-04-26 PROCEDURE — 2060000000 HC ICU INTERMEDIATE R&B

## 2024-04-26 PROCEDURE — 86403 PARTICLE AGGLUT ANTBDY SCRN: CPT

## 2024-04-26 PROCEDURE — 84443 ASSAY THYROID STIM HORMONE: CPT

## 2024-04-26 PROCEDURE — 82947 ASSAY GLUCOSE BLOOD QUANT: CPT

## 2024-04-26 PROCEDURE — 6360000002 HC RX W HCPCS

## 2024-04-26 PROCEDURE — 80053 COMPREHEN METABOLIC PANEL: CPT

## 2024-04-26 PROCEDURE — 96361 HYDRATE IV INFUSION ADD-ON: CPT

## 2024-04-26 PROCEDURE — 6370000000 HC RX 637 (ALT 250 FOR IP)

## 2024-04-26 PROCEDURE — 36415 COLL VENOUS BLD VENIPUNCTURE: CPT

## 2024-04-26 PROCEDURE — 2580000003 HC RX 258: Performed by: STUDENT IN AN ORGANIZED HEALTH CARE EDUCATION/TRAINING PROGRAM

## 2024-04-26 PROCEDURE — 96375 TX/PRO/DX INJ NEW DRUG ADDON: CPT

## 2024-04-26 PROCEDURE — 82010 KETONE BODYS QUAN: CPT

## 2024-04-26 PROCEDURE — 84100 ASSAY OF PHOSPHORUS: CPT

## 2024-04-26 PROCEDURE — 2580000003 HC RX 258

## 2024-04-26 PROCEDURE — 93005 ELECTROCARDIOGRAM TRACING: CPT

## 2024-04-26 PROCEDURE — 83036 HEMOGLOBIN GLYCOSYLATED A1C: CPT

## 2024-04-26 PROCEDURE — 81025 URINE PREGNANCY TEST: CPT

## 2024-04-26 PROCEDURE — 84484 ASSAY OF TROPONIN QUANT: CPT

## 2024-04-26 PROCEDURE — 99222 1ST HOSP IP/OBS MODERATE 55: CPT | Performed by: NURSE PRACTITIONER

## 2024-04-26 PROCEDURE — 83735 ASSAY OF MAGNESIUM: CPT

## 2024-04-26 PROCEDURE — 99285 EMERGENCY DEPT VISIT HI MDM: CPT

## 2024-04-26 PROCEDURE — 81001 URINALYSIS AUTO W/SCOPE: CPT

## 2024-04-26 PROCEDURE — 87086 URINE CULTURE/COLONY COUNT: CPT

## 2024-04-26 RX ORDER — MAGNESIUM HYDROXIDE/ALUMINUM HYDROXICE/SIMETHICONE 120; 1200; 1200 MG/30ML; MG/30ML; MG/30ML
30 SUSPENSION ORAL ONCE
Status: COMPLETED | OUTPATIENT
Start: 2024-04-26 | End: 2024-04-26

## 2024-04-26 RX ORDER — 0.9 % SODIUM CHLORIDE 0.9 %
15 INTRAVENOUS SOLUTION INTRAVENOUS ONCE
Status: COMPLETED | OUTPATIENT
Start: 2024-04-26 | End: 2024-04-26

## 2024-04-26 RX ORDER — DEXTROSE AND SODIUM CHLORIDE 5; .45 G/100ML; G/100ML
INJECTION, SOLUTION INTRAVENOUS CONTINUOUS PRN
Status: DISCONTINUED | OUTPATIENT
Start: 2024-04-26 | End: 2024-04-26 | Stop reason: SDUPTHER

## 2024-04-26 RX ORDER — ENOXAPARIN SODIUM 100 MG/ML
30 INJECTION SUBCUTANEOUS 2 TIMES DAILY
Status: DISCONTINUED | OUTPATIENT
Start: 2024-04-26 | End: 2024-04-29

## 2024-04-26 RX ORDER — DEXTROSE AND SODIUM CHLORIDE 5; .45 G/100ML; G/100ML
INJECTION, SOLUTION INTRAVENOUS CONTINUOUS PRN
Status: DISCONTINUED | OUTPATIENT
Start: 2024-04-26 | End: 2024-04-30 | Stop reason: HOSPADM

## 2024-04-26 RX ORDER — SODIUM CHLORIDE 9 MG/ML
INJECTION, SOLUTION INTRAVENOUS CONTINUOUS
Status: DISCONTINUED | OUTPATIENT
Start: 2024-04-26 | End: 2024-04-26 | Stop reason: SDUPTHER

## 2024-04-26 RX ORDER — 0.9 % SODIUM CHLORIDE 0.9 %
1000 INTRAVENOUS SOLUTION INTRAVENOUS ONCE
Status: DISCONTINUED | OUTPATIENT
Start: 2024-04-26 | End: 2024-04-26

## 2024-04-26 RX ORDER — FAMOTIDINE 10 MG/ML
20 INJECTION, SOLUTION INTRAVENOUS ONCE
Status: COMPLETED | OUTPATIENT
Start: 2024-04-26 | End: 2024-04-26

## 2024-04-26 RX ORDER — POTASSIUM CHLORIDE 7.45 MG/ML
10 INJECTION INTRAVENOUS PRN
Status: DISCONTINUED | OUTPATIENT
Start: 2024-04-26 | End: 2024-04-26 | Stop reason: SDUPTHER

## 2024-04-26 RX ORDER — MORPHINE SULFATE 4 MG/ML
4 INJECTION INTRAVENOUS ONCE
Status: COMPLETED | OUTPATIENT
Start: 2024-04-26 | End: 2024-04-26

## 2024-04-26 RX ORDER — SODIUM CHLORIDE, SODIUM LACTATE, POTASSIUM CHLORIDE, AND CALCIUM CHLORIDE .6; .31; .03; .02 G/100ML; G/100ML; G/100ML; G/100ML
1000 INJECTION, SOLUTION INTRAVENOUS ONCE
Status: COMPLETED | OUTPATIENT
Start: 2024-04-26 | End: 2024-04-26

## 2024-04-26 RX ORDER — ONDANSETRON 2 MG/ML
4 INJECTION INTRAMUSCULAR; INTRAVENOUS ONCE
Status: COMPLETED | OUTPATIENT
Start: 2024-04-26 | End: 2024-04-26

## 2024-04-26 RX ORDER — POLYETHYLENE GLYCOL 3350 17 G/17G
17 POWDER, FOR SOLUTION ORAL DAILY PRN
Status: DISCONTINUED | OUTPATIENT
Start: 2024-04-26 | End: 2024-04-30 | Stop reason: HOSPADM

## 2024-04-26 RX ORDER — MAGNESIUM SULFATE IN WATER 40 MG/ML
2000 INJECTION, SOLUTION INTRAVENOUS PRN
Status: DISCONTINUED | OUTPATIENT
Start: 2024-04-26 | End: 2024-04-30 | Stop reason: HOSPADM

## 2024-04-26 RX ORDER — ENOXAPARIN SODIUM 100 MG/ML
30 INJECTION SUBCUTANEOUS 2 TIMES DAILY
Status: DISCONTINUED | OUTPATIENT
Start: 2024-04-26 | End: 2024-04-26

## 2024-04-26 RX ORDER — SODIUM CHLORIDE 9 MG/ML
INJECTION, SOLUTION INTRAVENOUS CONTINUOUS
Status: DISCONTINUED | OUTPATIENT
Start: 2024-04-26 | End: 2024-04-28

## 2024-04-26 RX ORDER — SODIUM CHLORIDE 9 MG/ML
INJECTION, SOLUTION INTRAVENOUS PRN
Status: DISCONTINUED | OUTPATIENT
Start: 2024-04-26 | End: 2024-04-30 | Stop reason: HOSPADM

## 2024-04-26 RX ORDER — FAMOTIDINE 10 MG/ML
INJECTION, SOLUTION INTRAVENOUS
Status: COMPLETED
Start: 2024-04-26 | End: 2024-04-26

## 2024-04-26 RX ORDER — POTASSIUM CHLORIDE 7.45 MG/ML
10 INJECTION INTRAVENOUS PRN
Status: DISCONTINUED | OUTPATIENT
Start: 2024-04-26 | End: 2024-04-28

## 2024-04-26 RX ORDER — PANTOPRAZOLE SODIUM 40 MG/1
40 TABLET, DELAYED RELEASE ORAL
Status: DISCONTINUED | OUTPATIENT
Start: 2024-04-27 | End: 2024-04-30 | Stop reason: HOSPADM

## 2024-04-26 RX ADMIN — SODIUM CHLORIDE 4.52 UNITS/HR: 9 INJECTION, SOLUTION INTRAVENOUS at 19:39

## 2024-04-26 RX ADMIN — DEXTROSE AND SODIUM CHLORIDE: 5; 450 INJECTION, SOLUTION INTRAVENOUS at 21:41

## 2024-04-26 RX ADMIN — POTASSIUM CHLORIDE 10 MEQ: 7.46 INJECTION, SOLUTION INTRAVENOUS at 23:04

## 2024-04-26 RX ADMIN — MORPHINE SULFATE 4 MG: 4 INJECTION INTRAVENOUS at 19:12

## 2024-04-26 RX ADMIN — SODIUM CHLORIDE 1565 ML: 9 INJECTION, SOLUTION INTRAVENOUS at 19:39

## 2024-04-26 RX ADMIN — FAMOTIDINE 20 MG: 10 INJECTION, SOLUTION INTRAVENOUS at 17:45

## 2024-04-26 RX ADMIN — ALUMINUM HYDROXIDE, MAGNESIUM HYDROXIDE, AND SIMETHICONE 30 ML: 200; 200; 20 SUSPENSION ORAL at 17:43

## 2024-04-26 RX ADMIN — ENOXAPARIN SODIUM 30 MG: 100 INJECTION SUBCUTANEOUS at 22:26

## 2024-04-26 RX ADMIN — SODIUM CHLORIDE: 9 INJECTION, SOLUTION INTRAVENOUS at 23:03

## 2024-04-26 RX ADMIN — SODIUM CHLORIDE, POTASSIUM CHLORIDE, SODIUM LACTATE AND CALCIUM CHLORIDE 1000 ML: 600; 310; 30; 20 INJECTION, SOLUTION INTRAVENOUS at 17:18

## 2024-04-26 RX ADMIN — ONDANSETRON 4 MG: 2 INJECTION INTRAMUSCULAR; INTRAVENOUS at 17:18

## 2024-04-26 ASSESSMENT — PAIN SCALES - GENERAL
PAINLEVEL_OUTOF10: 6
PAINLEVEL_OUTOF10: 8

## 2024-04-26 NOTE — ED NOTES
Writer and medic student tried for IV access without success. Alexander RN at bedside attempting for access at this time

## 2024-04-26 NOTE — ED NOTES
Pt reports to the ED with complaints of dizziness and N/V for a few days now. Pt admits to some chest pain that she states feels like heartburn. Pt also admits to some intermittent SOB within the last few days but denies at this time. Pt also admits to lightheadedness, denies any falls or LOC at this time. Pt states she has a hx of type 2 DM and takes her insulin as prescribed but does not check her sugars at home. Pt states she has been eliminating at baseline. Per epic, pt also has a hx of pancreatitis, PE, HTN and CKD. Pt states she does not take eliquis anymore. Pt is A&O x4 and speaking in complete sentences. Pt is resting in bed comfortably, NAD noted. EKG obtained in triage. Pt placed on full cardiac monitor. Care ongoing

## 2024-04-26 NOTE — ED NOTES
The following labs were labeled with appropriate pt sticker and tubed to lab:     [x] Blue     [x] Lavender   [] on ice  [x] Green/yellow  [] Green/black [] on ice  [] Roberts  [] on ice  [x] Yellow  [] Red  [] Pink  [] Type/ Screen  [] ABG  [] VBG    [] COVID-19 swab    [] Rapid  [] PCR  [] Flu swab  [] Peds Viral Panel     [] Urine Sample  [] Fecal Sample  [] Pelvic Cultures  [] Blood Cultures  [] X 2  [] STREP Cultures  [] Wound Cultures

## 2024-04-26 NOTE — ED NOTES
ED to inpatient nurses report      Chief Complaint:  Chief Complaint   Patient presents with    Chest Pain     States feels like heartburn    Dizziness    Emesis     Present to ED from: Home    MOA:     LOC: Alert  Mobility: Ambulatory at baseline, have not gotten pt up  Oxygen Baseline: RA    Current needs required: None  Pending ED orders: Insulin drip, awaiting pharmacy to send down  Present condition: Resting comfortably, NAD noted    Why did the patient come to the ED?   Abdominal pain, N/V, dizziness  What is the plan?   Tx DKA  Any procedures or intervention occur?   Labs  Any safety concerns??    Mental Status:       Psych Assessment:   Psychosocial  Psychosocial (WDL): Within Defined Limits  Vital signs   Vitals:    04/26/24 1830 04/26/24 1845 04/26/24 1848 04/26/24 1853   BP: 104/76      Pulse: 83 82  81   Resp: 15 15  12   Temp:       TempSrc:       SpO2: 100% 97%  97%   Weight:   104.3 kg (230 lb)         Vitals:  Patient Vitals for the past 24 hrs:   BP Temp Temp src Pulse Resp SpO2 Weight   04/26/24 1853 -- -- -- 81 12 97 % --   04/26/24 1848 -- -- -- -- -- -- 104.3 kg (230 lb)   04/26/24 1845 -- -- -- 82 15 97 % --   04/26/24 1830 104/76 -- -- 83 15 100 % --   04/26/24 1800 102/75 -- -- 81 18 100 % --   04/26/24 1730 -- -- -- 84 17 99 % --   04/26/24 1721 -- -- -- 84 13 98 % --   04/26/24 1720 102/79 -- -- -- -- -- --   04/26/24 1643 -- -- -- 96 15 100 % --   04/26/24 1641 (!) 123/97 -- -- 97 14 95 % --   04/26/24 1635 (!) 117/90 -- -- -- -- -- --   04/26/24 1634 -- 97.9 °F (36.6 °C) Oral 97 20 100 % --      Visit Vitals  /76   Pulse 81   Temp 97.9 °F (36.6 °C) (Oral)   Resp 12   Wt 104.3 kg (230 lb)   SpO2 97%   BMI 36.02 kg/m²        LDAs:   Peripheral IV 04/26/24 Left Forearm (Active)   Site Assessment Clean, dry & intact 04/26/24 1718   Line Status Blood return noted;Flushed 04/26/24 1718       Peripheral IV 04/26/24 Right Forearm (Active)   Site Assessment Clean, dry & intact 04/26/24 1718

## 2024-04-27 LAB
ALBUMIN SERPL-MCNC: 3.3 G/DL (ref 3.5–5.2)
ALBUMIN/GLOB SERPL: 1 {RATIO} (ref 1–2.5)
ALP SERPL-CCNC: 85 U/L (ref 35–104)
ALT SERPL-CCNC: <5 U/L (ref 10–35)
ANION GAP SERPL CALCULATED.3IONS-SCNC: 10 MMOL/L (ref 9–16)
ANION GAP SERPL CALCULATED.3IONS-SCNC: 11 MMOL/L (ref 9–16)
ANION GAP SERPL CALCULATED.3IONS-SCNC: 12 MMOL/L (ref 9–16)
ANION GAP SERPL CALCULATED.3IONS-SCNC: 17 MMOL/L (ref 9–16)
AST SERPL-CCNC: 16 U/L (ref 10–35)
BASOPHILS # BLD: 0.04 K/UL (ref 0–0.2)
BASOPHILS NFR BLD: 1 % (ref 0–2)
BILIRUB DIRECT SERPL-MCNC: <0.2 MG/DL (ref 0–0.3)
BILIRUB INDIRECT SERPL-MCNC: 0.2 MG/DL (ref 0–1)
BILIRUB SERPL-MCNC: 0.3 MG/DL (ref 0–1.2)
BUN SERPL-MCNC: 4 MG/DL (ref 6–20)
BUN SERPL-MCNC: 4 MG/DL (ref 6–20)
BUN SERPL-MCNC: 5 MG/DL (ref 6–20)
BUN SERPL-MCNC: 6 MG/DL (ref 6–20)
CALCIUM SERPL-MCNC: 7.7 MG/DL (ref 8.6–10.4)
CALCIUM SERPL-MCNC: 8 MG/DL (ref 8.6–10.4)
CALCIUM SERPL-MCNC: 8.3 MG/DL (ref 8.6–10.4)
CALCIUM SERPL-MCNC: 8.3 MG/DL (ref 8.6–10.4)
CHLORIDE SERPL-SCNC: 108 MMOL/L (ref 98–107)
CHLORIDE SERPL-SCNC: 110 MMOL/L (ref 98–107)
CHLORIDE SERPL-SCNC: 111 MMOL/L (ref 98–107)
CHLORIDE SERPL-SCNC: 112 MMOL/L (ref 98–107)
CO2 SERPL-SCNC: 13 MMOL/L (ref 20–31)
CO2 SERPL-SCNC: 14 MMOL/L (ref 20–31)
CO2 SERPL-SCNC: 15 MMOL/L (ref 20–31)
CO2 SERPL-SCNC: 16 MMOL/L (ref 20–31)
CREAT SERPL-MCNC: 0.7 MG/DL (ref 0.5–0.9)
CREAT SERPL-MCNC: 0.7 MG/DL (ref 0.5–0.9)
CREAT SERPL-MCNC: 0.8 MG/DL (ref 0.5–0.9)
CREAT SERPL-MCNC: 0.8 MG/DL (ref 0.5–0.9)
EKG ATRIAL RATE: 89 BPM
EKG P AXIS: 27 DEGREES
EKG P-R INTERVAL: 166 MS
EKG Q-T INTERVAL: 366 MS
EKG QRS DURATION: 68 MS
EKG QTC CALCULATION (BAZETT): 445 MS
EKG R AXIS: -40 DEGREES
EKG T AXIS: 55 DEGREES
EKG VENTRICULAR RATE: 89 BPM
EOSINOPHIL # BLD: 0.08 K/UL (ref 0–0.44)
EOSINOPHILS RELATIVE PERCENT: 1 % (ref 1–4)
ERYTHROCYTE [DISTWIDTH] IN BLOOD BY AUTOMATED COUNT: 13.7 % (ref 11.8–14.4)
GFR SERPL CREATININE-BSD FRML MDRD: >90 ML/MIN/1.73M2
GLOBULIN SER CALC-MCNC: 2.4 G/DL
GLUCOSE BLD-MCNC: 117 MG/DL (ref 65–105)
GLUCOSE BLD-MCNC: 121 MG/DL (ref 65–105)
GLUCOSE BLD-MCNC: 122 MG/DL (ref 65–105)
GLUCOSE BLD-MCNC: 123 MG/DL (ref 65–105)
GLUCOSE BLD-MCNC: 130 MG/DL (ref 65–105)
GLUCOSE BLD-MCNC: 148 MG/DL (ref 65–105)
GLUCOSE BLD-MCNC: 148 MG/DL (ref 65–105)
GLUCOSE BLD-MCNC: 159 MG/DL (ref 65–105)
GLUCOSE BLD-MCNC: 171 MG/DL (ref 65–105)
GLUCOSE BLD-MCNC: 187 MG/DL (ref 65–105)
GLUCOSE BLD-MCNC: 195 MG/DL (ref 65–105)
GLUCOSE BLD-MCNC: 207 MG/DL (ref 65–105)
GLUCOSE BLD-MCNC: 214 MG/DL (ref 65–105)
GLUCOSE BLD-MCNC: 214 MG/DL (ref 65–105)
GLUCOSE BLD-MCNC: 218 MG/DL (ref 65–105)
GLUCOSE BLD-MCNC: 229 MG/DL (ref 65–105)
GLUCOSE BLD-MCNC: 230 MG/DL (ref 65–105)
GLUCOSE BLD-MCNC: 231 MG/DL (ref 65–105)
GLUCOSE BLD-MCNC: 235 MG/DL (ref 65–105)
GLUCOSE BLD-MCNC: 236 MG/DL (ref 65–105)
GLUCOSE BLD-MCNC: 256 MG/DL (ref 65–105)
GLUCOSE BLD-MCNC: 273 MG/DL (ref 65–105)
GLUCOSE BLD-MCNC: 323 MG/DL (ref 65–105)
GLUCOSE SERPL-MCNC: 124 MG/DL (ref 74–99)
GLUCOSE SERPL-MCNC: 142 MG/DL (ref 74–99)
GLUCOSE SERPL-MCNC: 219 MG/DL (ref 74–99)
GLUCOSE SERPL-MCNC: 231 MG/DL (ref 74–99)
HCT VFR BLD AUTO: 39.4 % (ref 36.3–47.1)
HGB BLD-MCNC: 12.4 G/DL (ref 11.9–15.1)
IMM GRANULOCYTES # BLD AUTO: <0.03 K/UL (ref 0–0.3)
IMM GRANULOCYTES NFR BLD: 0 %
LIPASE SERPL-CCNC: 111 U/L (ref 13–60)
LYMPHOCYTES NFR BLD: 2.69 K/UL (ref 1.1–3.7)
LYMPHOCYTES RELATIVE PERCENT: 49 % (ref 24–43)
MAGNESIUM SERPL-MCNC: 1.6 MG/DL (ref 1.6–2.6)
MAGNESIUM SERPL-MCNC: 1.6 MG/DL (ref 1.6–2.6)
MAGNESIUM SERPL-MCNC: 1.7 MG/DL (ref 1.6–2.6)
MAGNESIUM SERPL-MCNC: 2.1 MG/DL (ref 1.6–2.6)
MCH RBC QN AUTO: 30 PG (ref 25.2–33.5)
MCHC RBC AUTO-ENTMCNC: 31.5 G/DL (ref 28.4–34.8)
MCV RBC AUTO: 95.4 FL (ref 82.6–102.9)
MICROORGANISM SPEC CULT: ABNORMAL
MONOCYTES NFR BLD: 0.52 K/UL (ref 0.1–1.2)
MONOCYTES NFR BLD: 9 % (ref 3–12)
NEUTROPHILS NFR BLD: 40 % (ref 36–65)
NEUTS SEG NFR BLD: 2.21 K/UL (ref 1.5–8.1)
NRBC BLD-RTO: 0 PER 100 WBC
PHOSPHATE SERPL-MCNC: 2.2 MG/DL (ref 2.5–4.5)
PHOSPHATE SERPL-MCNC: 2.5 MG/DL (ref 2.5–4.5)
PHOSPHATE SERPL-MCNC: 2.6 MG/DL (ref 2.5–4.5)
PHOSPHATE SERPL-MCNC: 3.1 MG/DL (ref 2.5–4.5)
PLATELET # BLD AUTO: 195 K/UL (ref 138–453)
PMV BLD AUTO: 11.6 FL (ref 8.1–13.5)
POTASSIUM SERPL-SCNC: 3.2 MMOL/L (ref 3.7–5.3)
POTASSIUM SERPL-SCNC: 3.5 MMOL/L (ref 3.7–5.3)
POTASSIUM SERPL-SCNC: 4 MMOL/L (ref 3.7–5.3)
POTASSIUM SERPL-SCNC: 4 MMOL/L (ref 3.7–5.3)
PROT SERPL-MCNC: 5.7 G/DL (ref 6.6–8.7)
RBC # BLD AUTO: 4.13 M/UL (ref 3.95–5.11)
SODIUM SERPL-SCNC: 136 MMOL/L (ref 136–145)
SODIUM SERPL-SCNC: 137 MMOL/L (ref 136–145)
SODIUM SERPL-SCNC: 138 MMOL/L (ref 136–145)
SODIUM SERPL-SCNC: 138 MMOL/L (ref 136–145)
SPECIMEN DESCRIPTION: ABNORMAL
WBC OTHER # BLD: 5.6 K/UL (ref 3.5–11.3)

## 2024-04-27 PROCEDURE — 6370000000 HC RX 637 (ALT 250 FOR IP): Performed by: NURSE PRACTITIONER

## 2024-04-27 PROCEDURE — 6360000002 HC RX W HCPCS: Performed by: NURSE PRACTITIONER

## 2024-04-27 PROCEDURE — 36415 COLL VENOUS BLD VENIPUNCTURE: CPT

## 2024-04-27 PROCEDURE — 6360000002 HC RX W HCPCS

## 2024-04-27 PROCEDURE — 85025 COMPLETE CBC W/AUTO DIFF WBC: CPT

## 2024-04-27 PROCEDURE — 2060000000 HC ICU INTERMEDIATE R&B

## 2024-04-27 PROCEDURE — 83735 ASSAY OF MAGNESIUM: CPT

## 2024-04-27 PROCEDURE — 80076 HEPATIC FUNCTION PANEL: CPT

## 2024-04-27 PROCEDURE — 84100 ASSAY OF PHOSPHORUS: CPT

## 2024-04-27 PROCEDURE — 83690 ASSAY OF LIPASE: CPT

## 2024-04-27 PROCEDURE — 82947 ASSAY GLUCOSE BLOOD QUANT: CPT

## 2024-04-27 PROCEDURE — 2580000003 HC RX 258

## 2024-04-27 PROCEDURE — 2500000003 HC RX 250 WO HCPCS

## 2024-04-27 PROCEDURE — 99231 SBSQ HOSP IP/OBS SF/LOW 25: CPT | Performed by: STUDENT IN AN ORGANIZED HEALTH CARE EDUCATION/TRAINING PROGRAM

## 2024-04-27 PROCEDURE — 99232 SBSQ HOSP IP/OBS MODERATE 35: CPT | Performed by: STUDENT IN AN ORGANIZED HEALTH CARE EDUCATION/TRAINING PROGRAM

## 2024-04-27 PROCEDURE — 6370000000 HC RX 637 (ALT 250 FOR IP)

## 2024-04-27 PROCEDURE — 80048 BASIC METABOLIC PNL TOTAL CA: CPT

## 2024-04-27 RX ORDER — DOCUSATE SODIUM 100 MG/1
100 CAPSULE, LIQUID FILLED ORAL 2 TIMES DAILY
Status: DISCONTINUED | OUTPATIENT
Start: 2024-04-27 | End: 2024-04-30 | Stop reason: HOSPADM

## 2024-04-27 RX ADMIN — POTASSIUM CHLORIDE 10 MEQ: 7.46 INJECTION, SOLUTION INTRAVENOUS at 04:34

## 2024-04-27 RX ADMIN — SODIUM CHLORIDE 10.65 UNITS/HR: 9 INJECTION, SOLUTION INTRAVENOUS at 20:56

## 2024-04-27 RX ADMIN — POTASSIUM CHLORIDE 10 MEQ: 7.46 INJECTION, SOLUTION INTRAVENOUS at 15:16

## 2024-04-27 RX ADMIN — POTASSIUM CHLORIDE 10 MEQ: 7.46 INJECTION, SOLUTION INTRAVENOUS at 18:45

## 2024-04-27 RX ADMIN — POTASSIUM CHLORIDE 10 MEQ: 7.46 INJECTION, SOLUTION INTRAVENOUS at 17:08

## 2024-04-27 RX ADMIN — ENOXAPARIN SODIUM 30 MG: 100 INJECTION SUBCUTANEOUS at 21:20

## 2024-04-27 RX ADMIN — SODIUM PHOSPHATE, MONOBASIC, MONOHYDRATE AND SODIUM PHOSPHATE, DIBASIC, ANHYDROUS 15 MMOL: 276; 142 INJECTION, SOLUTION INTRAVENOUS at 00:31

## 2024-04-27 RX ADMIN — DEXTROSE AND SODIUM CHLORIDE: 5; 450 INJECTION, SOLUTION INTRAVENOUS at 23:27

## 2024-04-27 RX ADMIN — POTASSIUM CHLORIDE 10 MEQ: 7.46 INJECTION, SOLUTION INTRAVENOUS at 03:32

## 2024-04-27 RX ADMIN — SODIUM PHOSPHATE, MONOBASIC, MONOHYDRATE AND SODIUM PHOSPHATE, DIBASIC, ANHYDROUS 15 MMOL: 276; 142 INJECTION, SOLUTION INTRAVENOUS at 15:25

## 2024-04-27 RX ADMIN — POTASSIUM CHLORIDE 10 MEQ: 7.46 INJECTION, SOLUTION INTRAVENOUS at 21:24

## 2024-04-27 RX ADMIN — DOCUSATE SODIUM 100 MG: 100 CAPSULE, LIQUID FILLED ORAL at 21:20

## 2024-04-27 RX ADMIN — POTASSIUM CHLORIDE 10 MEQ: 7.46 INJECTION, SOLUTION INTRAVENOUS at 05:37

## 2024-04-27 RX ADMIN — PANTOPRAZOLE SODIUM 40 MG: 40 TABLET, DELAYED RELEASE ORAL at 07:44

## 2024-04-27 RX ADMIN — ENOXAPARIN SODIUM 30 MG: 100 INJECTION SUBCUTANEOUS at 07:44

## 2024-04-27 RX ADMIN — POTASSIUM CHLORIDE 10 MEQ: 7.46 INJECTION, SOLUTION INTRAVENOUS at 19:59

## 2024-04-27 RX ADMIN — MAGNESIUM SULFATE HEPTAHYDRATE 2000 MG: 40 INJECTION, SOLUTION INTRAVENOUS at 14:09

## 2024-04-27 RX ADMIN — POTASSIUM CHLORIDE 10 MEQ: 7.46 INJECTION, SOLUTION INTRAVENOUS at 02:38

## 2024-04-27 RX ADMIN — POTASSIUM CHLORIDE 10 MEQ: 7.46 INJECTION, SOLUTION INTRAVENOUS at 14:17

## 2024-04-27 NOTE — H&P
[facility]  Office: [phone]  Crow Wallace DO, Maximiliano Sabillon DO, Albino Cadena DO, Amol Solano DO, Erlin Devries MD, Lor Fontana MD, Tiffanie Campos MD, Sherry Al MD,  Miguelito Theodore MD, Karina Blanco MD, Krupa Sanchez MD,  Kiesha Kaur DO, Paxton Rivas MD, Sriram Portillo MD, Agustín Wallace DO, Kourtney Malik MD,  Jose Guadalupe Moralez DO, Amber Sanchez MD, Diana Ballesteros MD, Ruthie Lacey MD, El Solares MD,  Portillo Cabral MD, Myra Montoya MD, Farzana Blackburn MD, Aron Cabrera MD, Lj Morales MD, Lidia Reid MD, Rock Mahajan DO, Ed Hayden DO, France Chen MD,  Elmer Dunlap MD, Shirley Waterhouse, CNP,  Toya Redman CNP, Andrea Hugo, CNP,  April Espinal, DNP, Donna Lizama, CNP, Amaya Vaca, CNP, Baylee Wilson, CNP, Sugar Bills, CNP, Kaia Finley PASusanC, Meredith Kent PA-C, Mahogany Luna, CNP, Coleen Pike, CNP, Catherine De La Rosa, CNP, Jyotsna Lal, CNP, Kira Guillen, CNP, Yana Viveros, CNS, Mago Mai, CNP, Anastasia Rudolph CNP, Tracy Schwab, CNP         [facility]   IN-PATIENT SERVICE   [facility]    HISTORY AND PHYSICAL EXAMINATION            Date:   4/26/2024  Patient name:  Ruth Martínez  Date of admission:  4/26/2024  4:35 PM  MRN:   [id number]  Account:  [id number]  YOB: 1990  PCP:    Indiar Chavarria PA-C  Room:   [facility]0416-  Code Status:    Full Code    Chief Complaint:     Chief Complaint   Patient presents with    Chest Pain     States feels like heartburn    Dizziness    Emesis       History Obtained From:     patient and medical records     History of Present Illness:     Ruth Martínez is a 33 y.o. Non- / non  female who presents with Chest Pain (States feels like heartburn), Dizziness, and Emesis   and is admitted to the hospital for the management of DKA, type 2, not at goal (HCC).    Mrs. Martínez is a 33-year-old female with type 2 diabetes and

## 2024-04-27 NOTE — PLAN OF CARE
Problem: Discharge Planning  Goal: Discharge to home or other facility with appropriate resources  4/27/2024 0946 by Yvette Louis, RN  Outcome: Progressing  4/27/2024 0710 by Kirt Singh, RN  Outcome: Progressing     Problem: Safety - Adult  Goal: Free from fall injury  Outcome: Progressing

## 2024-04-27 NOTE — CARE COORDINATION
Case Management Assessment  Initial Evaluation    Date/Time of Evaluation: 4/27/2024 1:02 PM  Assessment Completed by: Bettye Magdaleno RN    If patient is discharged prior to next notation, then this note serves as note for discharge by case management.    Patient Name: Ruth Martínez                   YOB: 1990  Diagnosis: DKA, type 2, not at goal (HCC) [E11.10]  Diabetic ketoacidosis without coma associated with type 2 diabetes mellitus (HCC) [E11.10]  Alcohol-induced acute pancreatitis, unspecified complication status [K85.20]                   Date / Time: 4/26/2024  4:35 PM    Patient Admission Status: Inpatient   Readmission Risk (Low < 19, Mod (19-27), High > 27): Readmission Risk Score: 20.4    Current PCP: Indira Chavarria PA-C  PCP verified by CM? Yes    Chart Reviewed: Yes      History Provided by: Child/Family  Patient Orientation: Alert and Oriented    Patient Cognition: Other (see comment) (slow to respond)    Hospitalization in the last 30 days (Readmission):  No    If yes, Readmission Assessment in  Navigator will be completed.    Advance Directives:      Code Status: Full Code   Patient's Primary Decision Maker is: Legal Next of Kin    Primary Decision Maker: Soledad Waters - Brother/Sister - [phone]    Discharge Planning:    Patient lives with: Children Type of Home: House  Primary Care Giver: Self  Patient Support Systems include: Children, Family Members   Current Financial resources: Medicare  Current community resources: Other (Comment)  Current services prior to admission: None            Current DME:              Type of Home Care services:       ADLS  Prior functional level: Independent in ADLs/IADLs  Current functional level: Independent in ADLs/IADLs    PT AM-PAC:   /24  OT AM-PAC:   /24    Family can provide assistance at DC: No  Would you like Case Management to discuss the discharge plan with any other family members/significant others, and if so, who? No  Plans

## 2024-04-28 LAB
ANION GAP SERPL CALCULATED.3IONS-SCNC: 10 MMOL/L (ref 9–16)
ANION GAP SERPL CALCULATED.3IONS-SCNC: 11 MMOL/L (ref 9–16)
ANION GAP SERPL CALCULATED.3IONS-SCNC: 9 MMOL/L (ref 9–16)
BUN SERPL-MCNC: 2 MG/DL (ref 6–20)
BUN SERPL-MCNC: 2 MG/DL (ref 6–20)
BUN SERPL-MCNC: 3 MG/DL (ref 6–20)
CALCIUM SERPL-MCNC: 7.8 MG/DL (ref 8.6–10.4)
CALCIUM SERPL-MCNC: 8 MG/DL (ref 8.6–10.4)
CALCIUM SERPL-MCNC: 8.2 MG/DL (ref 8.6–10.4)
CHLORIDE SERPL-SCNC: 113 MMOL/L (ref 98–107)
CHLORIDE SERPL-SCNC: 114 MMOL/L (ref 98–107)
CHLORIDE SERPL-SCNC: 115 MMOL/L (ref 98–107)
CO2 SERPL-SCNC: 15 MMOL/L (ref 20–31)
CO2 SERPL-SCNC: 16 MMOL/L (ref 20–31)
CO2 SERPL-SCNC: 16 MMOL/L (ref 20–31)
CREAT SERPL-MCNC: 0.7 MG/DL (ref 0.5–0.9)
GFR SERPL CREATININE-BSD FRML MDRD: >90 ML/MIN/1.73M2
GLUCOSE BLD-MCNC: 123 MG/DL (ref 65–105)
GLUCOSE BLD-MCNC: 127 MG/DL (ref 65–105)
GLUCOSE BLD-MCNC: 138 MG/DL (ref 65–105)
GLUCOSE BLD-MCNC: 142 MG/DL (ref 65–105)
GLUCOSE BLD-MCNC: 146 MG/DL (ref 65–105)
GLUCOSE BLD-MCNC: 158 MG/DL (ref 65–105)
GLUCOSE BLD-MCNC: 197 MG/DL (ref 65–105)
GLUCOSE BLD-MCNC: 249 MG/DL (ref 65–105)
GLUCOSE BLD-MCNC: 255 MG/DL (ref 65–105)
GLUCOSE BLD-MCNC: 281 MG/DL (ref 65–105)
GLUCOSE SERPL-MCNC: 145 MG/DL (ref 74–99)
GLUCOSE SERPL-MCNC: 149 MG/DL (ref 74–99)
GLUCOSE SERPL-MCNC: 179 MG/DL (ref 74–99)
LIPASE SERPL-CCNC: 89 U/L (ref 13–60)
MAGNESIUM SERPL-MCNC: 2.1 MG/DL (ref 1.6–2.6)
PHOSPHATE SERPL-MCNC: 1.9 MG/DL (ref 2.5–4.5)
POTASSIUM SERPL-SCNC: 3.4 MMOL/L (ref 3.7–5.3)
POTASSIUM SERPL-SCNC: 3.6 MMOL/L (ref 3.7–5.3)
POTASSIUM SERPL-SCNC: 3.6 MMOL/L (ref 3.7–5.3)
PROCALCITONIN SERPL-MCNC: <0.02 NG/ML (ref 0–0.09)
SODIUM SERPL-SCNC: 139 MMOL/L (ref 136–145)
SODIUM SERPL-SCNC: 140 MMOL/L (ref 136–145)
SODIUM SERPL-SCNC: 140 MMOL/L (ref 136–145)

## 2024-04-28 PROCEDURE — 6370000000 HC RX 637 (ALT 250 FOR IP): Performed by: NURSE PRACTITIONER

## 2024-04-28 PROCEDURE — 36415 COLL VENOUS BLD VENIPUNCTURE: CPT

## 2024-04-28 PROCEDURE — 2500000003 HC RX 250 WO HCPCS

## 2024-04-28 PROCEDURE — 2500000003 HC RX 250 WO HCPCS: Performed by: STUDENT IN AN ORGANIZED HEALTH CARE EDUCATION/TRAINING PROGRAM

## 2024-04-28 PROCEDURE — 6370000000 HC RX 637 (ALT 250 FOR IP): Performed by: STUDENT IN AN ORGANIZED HEALTH CARE EDUCATION/TRAINING PROGRAM

## 2024-04-28 PROCEDURE — 87040 BLOOD CULTURE FOR BACTERIA: CPT

## 2024-04-28 PROCEDURE — 80048 BASIC METABOLIC PNL TOTAL CA: CPT

## 2024-04-28 PROCEDURE — 84100 ASSAY OF PHOSPHORUS: CPT

## 2024-04-28 PROCEDURE — 83735 ASSAY OF MAGNESIUM: CPT

## 2024-04-28 PROCEDURE — 2060000000 HC ICU INTERMEDIATE R&B

## 2024-04-28 PROCEDURE — 2580000003 HC RX 258

## 2024-04-28 PROCEDURE — 2580000003 HC RX 258: Performed by: STUDENT IN AN ORGANIZED HEALTH CARE EDUCATION/TRAINING PROGRAM

## 2024-04-28 PROCEDURE — 6360000002 HC RX W HCPCS: Performed by: NURSE PRACTITIONER

## 2024-04-28 PROCEDURE — 99232 SBSQ HOSP IP/OBS MODERATE 35: CPT | Performed by: STUDENT IN AN ORGANIZED HEALTH CARE EDUCATION/TRAINING PROGRAM

## 2024-04-28 PROCEDURE — 84145 PROCALCITONIN (PCT): CPT

## 2024-04-28 PROCEDURE — 6360000002 HC RX W HCPCS

## 2024-04-28 PROCEDURE — 83690 ASSAY OF LIPASE: CPT

## 2024-04-28 RX ORDER — POTASSIUM CHLORIDE 20 MEQ/1
40 TABLET, EXTENDED RELEASE ORAL ONCE
Status: COMPLETED | OUTPATIENT
Start: 2024-04-28 | End: 2024-04-28

## 2024-04-28 RX ORDER — INSULIN GLARGINE 100 [IU]/ML
30 INJECTION, SOLUTION SUBCUTANEOUS DAILY
Status: DISCONTINUED | OUTPATIENT
Start: 2024-04-28 | End: 2024-04-28

## 2024-04-28 RX ORDER — GLUCAGON 1 MG/ML
1 KIT INJECTION PRN
Status: DISCONTINUED | OUTPATIENT
Start: 2024-04-28 | End: 2024-04-30 | Stop reason: HOSPADM

## 2024-04-28 RX ORDER — INSULIN LISPRO 100 [IU]/ML
0-4 INJECTION, SOLUTION INTRAVENOUS; SUBCUTANEOUS NIGHTLY
Status: DISCONTINUED | OUTPATIENT
Start: 2024-04-28 | End: 2024-04-30 | Stop reason: HOSPADM

## 2024-04-28 RX ORDER — INSULIN LISPRO 100 [IU]/ML
3 INJECTION, SOLUTION INTRAVENOUS; SUBCUTANEOUS
Status: DISCONTINUED | OUTPATIENT
Start: 2024-04-28 | End: 2024-04-30 | Stop reason: HOSPADM

## 2024-04-28 RX ORDER — DEXTROSE MONOHYDRATE 100 MG/ML
INJECTION, SOLUTION INTRAVENOUS CONTINUOUS PRN
Status: DISCONTINUED | OUTPATIENT
Start: 2024-04-28 | End: 2024-04-30 | Stop reason: HOSPADM

## 2024-04-28 RX ORDER — INSULIN GLARGINE 100 [IU]/ML
25 INJECTION, SOLUTION SUBCUTANEOUS 2 TIMES DAILY
Status: DISCONTINUED | OUTPATIENT
Start: 2024-04-28 | End: 2024-04-29

## 2024-04-28 RX ORDER — POTASSIUM CHLORIDE 20 MEQ/1
40 TABLET, EXTENDED RELEASE ORAL PRN
Status: DISCONTINUED | OUTPATIENT
Start: 2024-04-28 | End: 2024-04-30 | Stop reason: HOSPADM

## 2024-04-28 RX ORDER — MIDODRINE HYDROCHLORIDE 5 MG/1
2.5 TABLET ORAL ONCE
Status: COMPLETED | OUTPATIENT
Start: 2024-04-28 | End: 2024-04-28

## 2024-04-28 RX ORDER — CEPHALEXIN 500 MG/1
500 CAPSULE ORAL EVERY 12 HOURS SCHEDULED
Status: DISCONTINUED | OUTPATIENT
Start: 2024-04-28 | End: 2024-04-30 | Stop reason: HOSPADM

## 2024-04-28 RX ORDER — INSULIN LISPRO 100 [IU]/ML
0-8 INJECTION, SOLUTION INTRAVENOUS; SUBCUTANEOUS
Status: DISCONTINUED | OUTPATIENT
Start: 2024-04-28 | End: 2024-04-28

## 2024-04-28 RX ORDER — INSULIN GLARGINE 100 [IU]/ML
20 INJECTION, SOLUTION SUBCUTANEOUS ONCE
Status: COMPLETED | OUTPATIENT
Start: 2024-04-28 | End: 2024-04-28

## 2024-04-28 RX ORDER — SODIUM CHLORIDE 450 MG/100ML
INJECTION, SOLUTION INTRAVENOUS CONTINUOUS
Status: DISCONTINUED | OUTPATIENT
Start: 2024-04-28 | End: 2024-04-28

## 2024-04-28 RX ORDER — INSULIN GLARGINE 100 [IU]/ML
30 INJECTION, SOLUTION SUBCUTANEOUS NIGHTLY
Status: DISCONTINUED | OUTPATIENT
Start: 2024-04-28 | End: 2024-04-28

## 2024-04-28 RX ORDER — INSULIN LISPRO 100 [IU]/ML
0-16 INJECTION, SOLUTION INTRAVENOUS; SUBCUTANEOUS
Status: DISCONTINUED | OUTPATIENT
Start: 2024-04-28 | End: 2024-04-30 | Stop reason: HOSPADM

## 2024-04-28 RX ORDER — INSULIN LISPRO 100 [IU]/ML
0-4 INJECTION, SOLUTION INTRAVENOUS; SUBCUTANEOUS NIGHTLY
Status: DISCONTINUED | OUTPATIENT
Start: 2024-04-28 | End: 2024-04-28

## 2024-04-28 RX ORDER — POTASSIUM CHLORIDE 7.45 MG/ML
10 INJECTION INTRAVENOUS PRN
Status: DISCONTINUED | OUTPATIENT
Start: 2024-04-28 | End: 2024-04-30 | Stop reason: HOSPADM

## 2024-04-28 RX ADMIN — ENOXAPARIN SODIUM 30 MG: 100 INJECTION SUBCUTANEOUS at 20:47

## 2024-04-28 RX ADMIN — POTASSIUM CHLORIDE 40 MEQ: 1500 TABLET, EXTENDED RELEASE ORAL at 12:15

## 2024-04-28 RX ADMIN — POTASSIUM CHLORIDE 10 MEQ: 7.46 INJECTION, SOLUTION INTRAVENOUS at 00:53

## 2024-04-28 RX ADMIN — PANCRELIPASE LIPASE, PANCRELIPASE PROTEASE, PANCRELIPASE AMYLASE 5000 UNITS: 5000; 17000; 24000 CAPSULE, DELAYED RELEASE ORAL at 12:14

## 2024-04-28 RX ADMIN — POTASSIUM CHLORIDE 10 MEQ: 7.46 INJECTION, SOLUTION INTRAVENOUS at 01:58

## 2024-04-28 RX ADMIN — CEPHALEXIN 500 MG: 500 CAPSULE ORAL at 20:47

## 2024-04-28 RX ADMIN — DOCUSATE SODIUM 100 MG: 100 CAPSULE, LIQUID FILLED ORAL at 09:26

## 2024-04-28 RX ADMIN — INSULIN LISPRO 3 UNITS: 100 INJECTION, SOLUTION INTRAVENOUS; SUBCUTANEOUS at 12:15

## 2024-04-28 RX ADMIN — INSULIN LISPRO 3 UNITS: 100 INJECTION, SOLUTION INTRAVENOUS; SUBCUTANEOUS at 16:40

## 2024-04-28 RX ADMIN — SODIUM BICARBONATE: 84 INJECTION, SOLUTION INTRAVENOUS at 22:16

## 2024-04-28 RX ADMIN — SODIUM BICARBONATE: 84 INJECTION, SOLUTION INTRAVENOUS at 12:13

## 2024-04-28 RX ADMIN — PANCRELIPASE LIPASE, PANCRELIPASE PROTEASE, PANCRELIPASE AMYLASE 5000 UNITS: 5000; 17000; 24000 CAPSULE, DELAYED RELEASE ORAL at 16:40

## 2024-04-28 RX ADMIN — DOCUSATE SODIUM 100 MG: 100 CAPSULE, LIQUID FILLED ORAL at 20:47

## 2024-04-28 RX ADMIN — INSULIN LISPRO 8 UNITS: 100 INJECTION, SOLUTION INTRAVENOUS; SUBCUTANEOUS at 16:40

## 2024-04-28 RX ADMIN — INSULIN GLARGINE 20 UNITS: 100 INJECTION, SOLUTION SUBCUTANEOUS at 01:58

## 2024-04-28 RX ADMIN — INSULIN LISPRO 8 UNITS: 100 INJECTION, SOLUTION INTRAVENOUS; SUBCUTANEOUS at 12:17

## 2024-04-28 RX ADMIN — SODIUM PHOSPHATE, MONOBASIC, MONOHYDRATE AND SODIUM PHOSPHATE, DIBASIC, ANHYDROUS 15 MMOL: 276; 142 INJECTION, SOLUTION INTRAVENOUS at 05:13

## 2024-04-28 RX ADMIN — POTASSIUM CHLORIDE 10 MEQ: 7.46 INJECTION, SOLUTION INTRAVENOUS at 03:04

## 2024-04-28 RX ADMIN — CEPHALEXIN 500 MG: 500 CAPSULE ORAL at 09:26

## 2024-04-28 RX ADMIN — ENOXAPARIN SODIUM 30 MG: 100 INJECTION SUBCUTANEOUS at 09:26

## 2024-04-28 RX ADMIN — PANTOPRAZOLE SODIUM 40 MG: 40 TABLET, DELAYED RELEASE ORAL at 05:13

## 2024-04-28 RX ADMIN — INSULIN GLARGINE 25 UNITS: 100 INJECTION, SOLUTION SUBCUTANEOUS at 20:47

## 2024-04-28 RX ADMIN — MIDODRINE HYDROCHLORIDE 2.5 MG: 5 TABLET ORAL at 19:50

## 2024-04-28 RX ADMIN — INSULIN GLARGINE 30 UNITS: 100 INJECTION, SOLUTION SUBCUTANEOUS at 09:26

## 2024-04-28 NOTE — PLAN OF CARE
Problem: Discharge Planning  Goal: Discharge to home or other facility with appropriate resources  Outcome: Progressing     Problem: Safety - Adult  Goal: Free from fall injury  Outcome: Progressing     Problem: Chronic Conditions and Co-morbidities  Goal: Patient's chronic conditions and co-morbidity symptoms are monitored and maintained or improved  Outcome: Progressing     Problem: ABCDS Injury Assessment  Goal: Absence of physical injury  Outcome: Progressing

## 2024-04-28 NOTE — CONSULTS
Nutrition Education    Educated on Diabetes ed; plate method; label reading  Learners: Patient  Readiness: Acceptance  Method: Explanation and Handout  Response: Verbalizes Understanding  Contact name and number provided.    Leighann Garduno RD  Contact Number: [phone]

## 2024-04-28 NOTE — PLAN OF CARE
Problem: Discharge Planning  Goal: Discharge to home or other facility with appropriate resources  4/28/2024 0919 by Grisel Han RN  Outcome: Progressing  4/28/2024 0226 by Kalpana Aguilar RN  Outcome: Progressing     Problem: Safety - Adult  Goal: Free from fall injury  4/28/2024 0919 by Grisel Han RN  Outcome: Progressing  4/28/2024 0226 by Kalpana Aguilar RN  Outcome: Progressing     Problem: Chronic Conditions and Co-morbidities  Goal: Patient's chronic conditions and co-morbidity symptoms are monitored and maintained or improved  4/28/2024 0919 by Grisel Han RN  Outcome: Progressing  4/28/2024 0226 by Kalpana Aguilar RN  Outcome: Progressing     Problem: ABCDS Injury Assessment  Goal: Absence of physical injury  4/28/2024 0919 by Grisel Han RN  Outcome: Progressing  4/28/2024 0226 by Kalpana Aguilar RN  Outcome: Progressing

## 2024-04-29 LAB
ANION GAP SERPL CALCULATED.3IONS-SCNC: 8 MMOL/L (ref 9–16)
BUN SERPL-MCNC: 4 MG/DL (ref 6–20)
CALCIUM SERPL-MCNC: 8.3 MG/DL (ref 8.6–10.4)
CHLORIDE SERPL-SCNC: 113 MMOL/L (ref 98–107)
CO2 SERPL-SCNC: 21 MMOL/L (ref 20–31)
CREAT SERPL-MCNC: 0.7 MG/DL (ref 0.5–0.9)
GFR SERPL CREATININE-BSD FRML MDRD: >90 ML/MIN/1.73M2
GLUCOSE BLD-MCNC: 142 MG/DL (ref 65–105)
GLUCOSE BLD-MCNC: 159 MG/DL (ref 65–105)
GLUCOSE BLD-MCNC: 179 MG/DL (ref 65–105)
GLUCOSE BLD-MCNC: 302 MG/DL (ref 65–105)
GLUCOSE SERPL-MCNC: 239 MG/DL (ref 74–99)
LIPASE SERPL-CCNC: 200 U/L (ref 13–60)
SODIUM SERPL-SCNC: 142 MMOL/L (ref 136–145)

## 2024-04-29 PROCEDURE — 80048 BASIC METABOLIC PNL TOTAL CA: CPT

## 2024-04-29 PROCEDURE — 2060000000 HC ICU INTERMEDIATE R&B

## 2024-04-29 PROCEDURE — 36415 COLL VENOUS BLD VENIPUNCTURE: CPT

## 2024-04-29 PROCEDURE — 82947 ASSAY GLUCOSE BLOOD QUANT: CPT

## 2024-04-29 PROCEDURE — 6370000000 HC RX 637 (ALT 250 FOR IP): Performed by: NURSE PRACTITIONER

## 2024-04-29 PROCEDURE — 83690 ASSAY OF LIPASE: CPT

## 2024-04-29 PROCEDURE — 6360000002 HC RX W HCPCS: Performed by: NURSE PRACTITIONER

## 2024-04-29 PROCEDURE — G0108 DIAB MANAGE TRN  PER INDIV: HCPCS

## 2024-04-29 PROCEDURE — 99232 SBSQ HOSP IP/OBS MODERATE 35: CPT | Performed by: INTERNAL MEDICINE

## 2024-04-29 PROCEDURE — 6370000000 HC RX 637 (ALT 250 FOR IP): Performed by: INTERNAL MEDICINE

## 2024-04-29 PROCEDURE — 2580000003 HC RX 258: Performed by: INTERNAL MEDICINE

## 2024-04-29 PROCEDURE — 6370000000 HC RX 637 (ALT 250 FOR IP): Performed by: STUDENT IN AN ORGANIZED HEALTH CARE EDUCATION/TRAINING PROGRAM

## 2024-04-29 RX ORDER — SODIUM CHLORIDE, SODIUM LACTATE, POTASSIUM CHLORIDE, CALCIUM CHLORIDE 600; 310; 30; 20 MG/100ML; MG/100ML; MG/100ML; MG/100ML
INJECTION, SOLUTION INTRAVENOUS CONTINUOUS
Status: DISCONTINUED | OUTPATIENT
Start: 2024-04-29 | End: 2024-04-30 | Stop reason: HOSPADM

## 2024-04-29 RX ORDER — ACETAMINOPHEN 325 MG/1
650 TABLET ORAL EVERY 4 HOURS PRN
Status: DISCONTINUED | OUTPATIENT
Start: 2024-04-29 | End: 2024-04-30 | Stop reason: HOSPADM

## 2024-04-29 RX ORDER — INSULIN GLARGINE 100 [IU]/ML
48 INJECTION, SOLUTION SUBCUTANEOUS DAILY
Status: DISCONTINUED | OUTPATIENT
Start: 2024-04-29 | End: 2024-04-30 | Stop reason: HOSPADM

## 2024-04-29 RX ADMIN — INSULIN LISPRO 3 UNITS: 100 INJECTION, SOLUTION INTRAVENOUS; SUBCUTANEOUS at 12:02

## 2024-04-29 RX ADMIN — ENOXAPARIN SODIUM 30 MG: 100 INJECTION SUBCUTANEOUS at 08:25

## 2024-04-29 RX ADMIN — DOCUSATE SODIUM 100 MG: 100 CAPSULE, LIQUID FILLED ORAL at 08:27

## 2024-04-29 RX ADMIN — CEPHALEXIN 500 MG: 500 CAPSULE ORAL at 21:16

## 2024-04-29 RX ADMIN — RIVAROXABAN 15 MG: 15 TABLET, FILM COATED ORAL at 17:09

## 2024-04-29 RX ADMIN — CEPHALEXIN 500 MG: 500 CAPSULE ORAL at 08:26

## 2024-04-29 RX ADMIN — INSULIN LISPRO 3 UNITS: 100 INJECTION, SOLUTION INTRAVENOUS; SUBCUTANEOUS at 17:09

## 2024-04-29 RX ADMIN — INSULIN GLARGINE 48 UNITS: 100 INJECTION, SOLUTION SUBCUTANEOUS at 08:25

## 2024-04-29 RX ADMIN — INSULIN LISPRO 3 UNITS: 100 INJECTION, SOLUTION INTRAVENOUS; SUBCUTANEOUS at 08:25

## 2024-04-29 RX ADMIN — SODIUM CHLORIDE, POTASSIUM CHLORIDE, SODIUM LACTATE AND CALCIUM CHLORIDE: 600; 310; 30; 20 INJECTION, SOLUTION INTRAVENOUS at 17:12

## 2024-04-29 RX ADMIN — DOCUSATE SODIUM 100 MG: 100 CAPSULE, LIQUID FILLED ORAL at 21:13

## 2024-04-29 RX ADMIN — PANCRELIPASE LIPASE, PANCRELIPASE PROTEASE, PANCRELIPASE AMYLASE 5000 UNITS: 5000; 17000; 24000 CAPSULE, DELAYED RELEASE ORAL at 08:26

## 2024-04-29 RX ADMIN — PANCRELIPASE LIPASE, PANCRELIPASE PROTEASE, PANCRELIPASE AMYLASE 5000 UNITS: 5000; 17000; 24000 CAPSULE, DELAYED RELEASE ORAL at 12:02

## 2024-04-29 RX ADMIN — ACETAMINOPHEN 650 MG: 325 TABLET ORAL at 21:12

## 2024-04-29 RX ADMIN — PANCRELIPASE LIPASE, PANCRELIPASE PROTEASE, PANCRELIPASE AMYLASE 100000 UNITS: 20000; 63000; 84000 CAPSULE, DELAYED RELEASE ORAL at 17:09

## 2024-04-29 RX ADMIN — PANTOPRAZOLE SODIUM 40 MG: 40 TABLET, DELAYED RELEASE ORAL at 05:36

## 2024-04-29 RX ADMIN — POTASSIUM CHLORIDE 40 MEQ: 1500 TABLET, EXTENDED RELEASE ORAL at 08:26

## 2024-04-29 ASSESSMENT — PAIN DESCRIPTION - ORIENTATION: ORIENTATION: UPPER;MID

## 2024-04-29 ASSESSMENT — PAIN DESCRIPTION - DESCRIPTORS: DESCRIPTORS: PRESSURE;SHARP

## 2024-04-29 ASSESSMENT — PAIN DESCRIPTION - LOCATION: LOCATION: ABDOMEN

## 2024-04-29 ASSESSMENT — PAIN SCALES - GENERAL: PAINLEVEL_OUTOF10: 7

## 2024-04-29 NOTE — DISCHARGE INSTRUCTIONS
Your Endocrinologist (Diabetes Specialist doctor) is at [facility]. You had an office visit with him in February of 2024. After you get home from the hospital call [phone] so they can give you a post hospitalization appointment.

## 2024-04-29 NOTE — PLAN OF CARE
Problem: Discharge Planning  Goal: Discharge to home or other facility with appropriate resources  4/29/2024 0750 by Ritu Sun RN  Outcome: Progressing  4/29/2024 0103 by Kalpana Aguilar RN  Outcome: Progressing     Problem: Safety - Adult  Goal: Free from fall injury  4/29/2024 0750 by Ritu Sun RN  Outcome: Progressing  4/29/2024 0103 by Kalpana Aguilar RN  Outcome: Progressing     Problem: Chronic Conditions and Co-morbidities  Goal: Patient's chronic conditions and co-morbidity symptoms are monitored and maintained or improved  4/29/2024 0750 by Ritu Sun RN  Outcome: Progressing  4/29/2024 0103 by Kalpana Aguilar RN  Outcome: Progressing     Problem: ABCDS Injury Assessment  Goal: Absence of physical injury  4/29/2024 0750 by Ritu Sun RN  Outcome: Progressing  4/29/2024 0103 by Kalpana Aguilar RN  Outcome: Progressing

## 2024-04-30 VITALS
DIASTOLIC BLOOD PRESSURE: 66 MMHG | BODY MASS INDEX: 34.46 KG/M2 | TEMPERATURE: 96.8 F | OXYGEN SATURATION: 100 % | SYSTOLIC BLOOD PRESSURE: 98 MMHG | WEIGHT: 219.58 LBS | HEIGHT: 67 IN | RESPIRATION RATE: 21 BRPM | HEART RATE: 70 BPM

## 2024-04-30 PROBLEM — K85.20 ALCOHOL-INDUCED ACUTE PANCREATITIS: Status: ACTIVE | Noted: 2024-04-30

## 2024-04-30 LAB
ANION GAP SERPL CALCULATED.3IONS-SCNC: 8 MMOL/L (ref 9–16)
BUN SERPL-MCNC: 7 MG/DL (ref 6–20)
CALCIUM SERPL-MCNC: 8.2 MG/DL (ref 8.6–10.4)
CHLORIDE SERPL-SCNC: 111 MMOL/L (ref 98–107)
CO2 SERPL-SCNC: 22 MMOL/L (ref 20–31)
CREAT SERPL-MCNC: 0.7 MG/DL (ref 0.5–0.9)
GFR SERPL CREATININE-BSD FRML MDRD: >90 ML/MIN/1.73M2
GLUCOSE BLD-MCNC: 121 MG/DL (ref 65–105)
GLUCOSE BLD-MCNC: 163 MG/DL (ref 65–105)
GLUCOSE BLD-MCNC: 200 MG/DL (ref 65–105)
GLUCOSE BLD-MCNC: 268 MG/DL (ref 65–105)
GLUCOSE SERPL-MCNC: 245 MG/DL (ref 74–99)
POTASSIUM SERPL-SCNC: 3.4 MMOL/L (ref 3.7–5.3)
SODIUM SERPL-SCNC: 141 MMOL/L (ref 136–145)

## 2024-04-30 PROCEDURE — 6370000000 HC RX 637 (ALT 250 FOR IP): Performed by: NURSE PRACTITIONER

## 2024-04-30 PROCEDURE — 2580000003 HC RX 258: Performed by: INTERNAL MEDICINE

## 2024-04-30 PROCEDURE — 36415 COLL VENOUS BLD VENIPUNCTURE: CPT

## 2024-04-30 PROCEDURE — 6370000000 HC RX 637 (ALT 250 FOR IP): Performed by: INTERNAL MEDICINE

## 2024-04-30 PROCEDURE — 6370000000 HC RX 637 (ALT 250 FOR IP): Performed by: STUDENT IN AN ORGANIZED HEALTH CARE EDUCATION/TRAINING PROGRAM

## 2024-04-30 PROCEDURE — 99239 HOSP IP/OBS DSCHRG MGMT >30: CPT | Performed by: STUDENT IN AN ORGANIZED HEALTH CARE EDUCATION/TRAINING PROGRAM

## 2024-04-30 PROCEDURE — 80048 BASIC METABOLIC PNL TOTAL CA: CPT

## 2024-04-30 RX ORDER — OXYCODONE HYDROCHLORIDE 5 MG/1
5 TABLET ORAL EVERY 6 HOURS PRN
Qty: 12 TABLET | Refills: 0 | Status: SHIPPED | OUTPATIENT
Start: 2024-04-30 | End: 2024-05-03

## 2024-04-30 RX ORDER — RIVAROXABAN 10 MG/1
20 TABLET, FILM COATED ORAL
Qty: 30 TABLET | Refills: 5 | Status: SHIPPED | OUTPATIENT
Start: 2024-05-21

## 2024-04-30 RX ORDER — MORPHINE SULFATE 4 MG/ML
4 INJECTION, SOLUTION INTRAMUSCULAR; INTRAVENOUS
Status: DISCONTINUED | OUTPATIENT
Start: 2024-04-30 | End: 2024-04-30 | Stop reason: HOSPADM

## 2024-04-30 RX ORDER — PANCRELIPASE LIPASE, PANCRELIPASE PROTEASE, PANCRELIPASE AMYLASE 252600; 60000; 189600 [USP'U]/1; [USP'U]/1; [USP'U]/1
1 CAPSULE, DELAYED RELEASE ORAL
Qty: 30 CAPSULE | Refills: 0 | Status: SHIPPED | OUTPATIENT
Start: 2024-04-30 | End: 2024-05-30

## 2024-04-30 RX ORDER — INSULIN GLARGINE 100 [IU]/ML
48 INJECTION, SOLUTION SUBCUTANEOUS DAILY
Qty: 5 ADJUSTABLE DOSE PRE-FILLED PEN SYRINGE | Refills: 3 | Status: SHIPPED | OUTPATIENT
Start: 2024-04-30

## 2024-04-30 RX ORDER — ONDANSETRON 4 MG/1
4 TABLET, FILM COATED ORAL 3 TIMES DAILY PRN
Qty: 15 TABLET | Refills: 0 | Status: SHIPPED | OUTPATIENT
Start: 2024-04-30

## 2024-04-30 RX ORDER — CEPHALEXIN 500 MG/1
500 CAPSULE ORAL EVERY 12 HOURS SCHEDULED
Qty: 2 CAPSULE | Refills: 0 | Status: SHIPPED | OUTPATIENT
Start: 2024-04-30 | End: 2024-05-01

## 2024-04-30 RX ORDER — MORPHINE SULFATE 2 MG/ML
2 INJECTION, SOLUTION INTRAMUSCULAR; INTRAVENOUS
Status: DISCONTINUED | OUTPATIENT
Start: 2024-04-30 | End: 2024-04-30 | Stop reason: HOSPADM

## 2024-04-30 RX ADMIN — INSULIN LISPRO 3 UNITS: 100 INJECTION, SOLUTION INTRAVENOUS; SUBCUTANEOUS at 08:34

## 2024-04-30 RX ADMIN — SODIUM CHLORIDE, POTASSIUM CHLORIDE, SODIUM LACTATE AND CALCIUM CHLORIDE: 600; 310; 30; 20 INJECTION, SOLUTION INTRAVENOUS at 04:17

## 2024-04-30 RX ADMIN — RIVAROXABAN 15 MG: 15 TABLET, FILM COATED ORAL at 08:34

## 2024-04-30 RX ADMIN — ACETAMINOPHEN 650 MG: 325 TABLET ORAL at 08:34

## 2024-04-30 RX ADMIN — DOCUSATE SODIUM 100 MG: 100 CAPSULE, LIQUID FILLED ORAL at 08:34

## 2024-04-30 RX ADMIN — SODIUM CHLORIDE, POTASSIUM CHLORIDE, SODIUM LACTATE AND CALCIUM CHLORIDE: 600; 310; 30; 20 INJECTION, SOLUTION INTRAVENOUS at 14:37

## 2024-04-30 RX ADMIN — INSULIN LISPRO 3 UNITS: 100 INJECTION, SOLUTION INTRAVENOUS; SUBCUTANEOUS at 12:26

## 2024-04-30 RX ADMIN — CEPHALEXIN 500 MG: 500 CAPSULE ORAL at 08:34

## 2024-04-30 RX ADMIN — PANTOPRAZOLE SODIUM 40 MG: 40 TABLET, DELAYED RELEASE ORAL at 05:29

## 2024-04-30 RX ADMIN — POTASSIUM CHLORIDE 40 MEQ: 1500 TABLET, EXTENDED RELEASE ORAL at 05:29

## 2024-04-30 RX ADMIN — INSULIN GLARGINE 48 UNITS: 100 INJECTION, SOLUTION SUBCUTANEOUS at 08:34

## 2024-04-30 ASSESSMENT — PAIN SCALES - GENERAL
PAINLEVEL_OUTOF10: 6
PAINLEVEL_OUTOF10: 6
PAINLEVEL_OUTOF10: 3

## 2024-04-30 ASSESSMENT — PAIN DESCRIPTION - LOCATION
LOCATION: ABDOMEN
LOCATION: ABDOMEN

## 2024-04-30 ASSESSMENT — PAIN DESCRIPTION - DESCRIPTORS: DESCRIPTORS: ACHING

## 2024-04-30 NOTE — PROGRESS NOTES
Pharmacy Medication Counseling Note    Rivaroxaban counseling provided to Ruth Martínez     Counseling points included:  1. Indication for rivaroxaban (dosing considerations): History of PE   2. Explanation of rivaroxaban (blood thinner)  3. Dose and directions, including missed doses and timing of medication  4. Side effects: bleeding/bruising  7. Contact prescriber when:   C. Uncontrolled bleeding or unusual bruising    Answered all medication-related questions and patient verbalized understanding.   
[facility]  Office: [phone]  Crow Wallace DO, Maximiliano Sabillon DO, Albino Cadena DO, Amol Solano DO, Erlin Devries MD, Lor Fontana MD, Tiffanie Campos MD, Sherry Al MD,  Miguelito Theodore MD, Karina Blanco MD, Krupa Sanchez MD,  Kiesha Kaur DO, Paxton Rivas MD, Sriram Portillo MD, Agustín Wallace DO, Kourtney Malik MD,  Jose Guadalupe Moralez DO, Amber Sanchez MD, Diana Ballesteros MD, Ruthie Lacey MD, El Solares MD,  Portillo Cabral MD, Myra Montoya MD, Farzana Blackburn MD, Aron Cabrera MD, Lj Morales MD, Lidia Reid MD, Rock Mahajan DO, Ed Hayden DO, France Chen MD,  Elmer Dunlap MD, Shirley Waterhouse, CNP,  Toya Redman CNP, Andrea Hugo, CNP,  April Espinal, LAUREN, Donna Lizama, CNP, Amaya Vaca, CNP, Baylee Wilson CNP, Sugar Bills CNP, WENDIE CrumpC, WENDIE BullockC, Mahogany Luna, CNP, Coleen Pike, CNP, Catherine De La Rosa CNP, Jyotsna Lal, CNP, Kira Guillen, CNP, Yana Viveros, CNS, Mago Mai, CNP, Anastasia Rudolph CNP, Tracy Schwab, CNP         [facility]   IN-PATIENT SERVICE   [facility]    Progress Note    4/29/2024    2:15 PM    Name:   Ruth Martínez  MRN:     [id number]     Acct:      [id number]   Room:   0416/0416-01   Day:  3  Admit Date:  4/26/2024  4:35 PM    PCP:   Indira Chavarria PA-C  Code Status:  Full Code    Subjective:     Patient is having some abdominal discomfort that she says is similar to prior pancreatitis episodes.  She feels like there is a knot in her stomach and is having a hard time tolerating food.  She feels like she wants to throw up.  She denies any fevers or chills.      Brief History:     33-year-old female with past medical history of type 2 diabetes mellitus, chronic pancreatitis presented to the hospital with nausea, abdominal pain and episodes of nonbloody vomitus she was unable to keep any food down.  Patient does not check her blood sugars 
CLINICAL PHARMACY NOTE: MEDS TO BEDS    Total # of Prescriptions Filled: 5   The following medications were delivered to the patient:  Xarelto 15  Xarelto 20  Cephalexin  Oxycodone  Ondansetron       Additional Documentation:   Pt picked up at pharmacy window- no copay    
[facility]  Office: [phone]  Crow Wallace DO, Maximiliano Sabillon, DO, Albino Cadena DO, Amol Solano, DO, Erlin Devries MD, Lor Fontana MD, Tiffanie Campos MD, Sherry Al MD,  Miguelito Theodore MD, Karina Blanco MD, Krupa Sanchez MD,  Kiesha Kaur DO, Paxton Rivas MD, Sriram Portillo MD, gAustín Wallace DO, Kourtney Malik MD,  Jose Guadalupe Moralez DO, Amber Sanchez MD, Diana Ballesteros MD, Ruthie Lacey MD, El Solares MD,  Portillo Cabral MD, Myra Montoya MD, Farzana Blackburn MD, Aron Cabrera MD, Lj Morales MD, Lidia Reid MD, Rock Mahajan DO, Ed Hayden DO, France Chen MD,  Elmer Dunlap MD, Shirley Waterhouse, CNP,  Toya Redman CNP, Andrea Hugo, CNP,  April Espinal, DNP, Donna Lizama, CNP, Amaya Vaca, CNP, Baylee Wilson, CNP, Sugar Bills CNP, Kaia Finley PASusanC, Meredith Kent PA-C, Mahogany Luna, CNP, Coleen Pike, CNP, Catherine De La Rosa, CNP, Jyotsna Lal, CNP, Kira Guillen, CNP, Yana Viveros, CNS, Mago Mai, CNP, Anastasia Rudolph CNP, Tracy Schwab, CNP         [facility]   IN-PATIENT SERVICE   [facility]    Progress Note    4/28/2024    8:39 AM    Name:   Ruth Martínez  MRN:     [id number]     Acct:      [id number]   Room:   0416/0416-01   Day:  2  Admit Date:  4/26/2024  4:35 PM    PCP:   Indira Chavarria PA-C  Code Status:  Full Code    Subjective:     C/C:   Chief Complaint   Patient presents with    Chest Pain     States feels like heartburn    Dizziness    Emesis     Interval History Status: not changed.     Patient seen at bedside.   Says that she does not feel good  Feels weaker  She appears dry  She was able to keep food down  Labs and vitals reviewed  Bicarb of 16, potassium 3.6, chloride 113, glucose of 280.  A1c of 11.7.  Tmax of 98.2, blood pressure  systolic.  She is saturating 98% on room air    Brief History:     33-year-old female with past medical history of type 2 diabetes 
Inpatient Diabetes Education    Ruth Martínez was seen for evaluation and education on Type 2 uncontrolled    Lab Results   Component Value Date    LABA1C 11.7 (H) 04/26/2024    LABA1C 11.0 (H) 02/10/2024    LABA1C 9.4 (H) 12/21/2023     Ruth states that when she lived in [place], she took metformin. She states that she was started on insulin after she moved to [place]. She estimates that was around October of 2023.     Ruth reports that her memory is not good but she has developed some strategies to help and lives with her sister to help as well. She showed me pictures on her phone of the insulin that she takes - Basaglar and Novolog. She was able to tell me in general the difference between meal time and long acting insulin.      She reports that she is not checking her BG right now due to being out of test strips - we tried to call [facility] together to see if she can get refill but they are on lunch break and no ability to leave message. Ruth states that she wore a CGM sensor before but denies having one currently. I saw that she had the [employer] 3 grayson on her cell phone. (She can't remember how she got the CGM sensor.)    She could not give me any information about seeing an endocrinologist - per chart review - during an admission in Feb. 2024 it was noted that she may be going to [facility]. I called [phone]. They were able to tell us that her last appt was in Feb and that she has no upcoming appt. I did place their number on her discharge summary (Ruth states that she would like to make an appt at a time when her sister is present.)      Following Survival Skills education topics were covered as appropriate to patient plan of for care:  _X__ Type 2 Diabetes diagnosis as chronic and progressive  __X_ Healthy eating - CHO food groups and need for CHO controlled diet.  Elimination of sugary beverages, avoid skipping meal  __X_ Blood Glucose Self-Monitoring ( BGSM)    _X__ Blood sugar targets 
Patient given discharge instructions and verbalizes understanding. Patient to pick medications up from pharmacy on way out. Iv removed and patient wheeled out.  
[facility]  Office: [phone]  Crow Wallace DO, Maximiliano Sabillon, DO, Albino Cadena DO, Amol Solano, DO, Erlin Devries MD, Lor Fontana MD, Tiffanie Campos MD, Sherry Al MD,  Miguelito Theodore MD, Karina Blanco MD, Krupa Sanchez MD,  Kiesha Kaur DO, Paxton Rivas MD, Sriram Portillo MD, Agustín Wallace DO, Kourtney Malik MD,  Jose Guadalupe Moralez DO, Amber Sanchez MD, Diana Ballesteros MD, Ruthie Lacey MD, El Solares MD,  Portillo Cabral MD, Myra Montoya MD, Farzana Blackburn MD, Aron Cabrera MD, Lj Morales MD, Lidia Reid MD, Rock Mahajan DO, Ed Hayden DO, France Chen MD,  Elmer Dunlap MD, Shirley Waterhouse, CNP,  Toya Redman CNP, Andrea Hugo, CNP,  April Espinal, DNP, Donna Lizama, CNP, Amaya Vaca, CNP, Baylee Wilson, CNP, Sugar Bills CNP, Kaia Finley PASusanC, Meredith Kent PA-C, Mahogany Luna, CNP, Coleen Pike, CNP, Catherine De La Rosa, CNP, Jyotsna Lal, CNP, Kira Guillen, CNP, Yana Viveros, CNS, Mago Mai, CNP, Anastasia Rudolph CNP, Tracy Schwab, CNP         [facility]   IN-PATIENT SERVICE   [facility]    Progress Note    4/27/2024    7:24 AM    Name:   Ruth Martínez  MRN:     [id number]     Acct:      [id number]   Room:   0416/0416-01   Day:  1  Admit Date:  4/26/2024  4:35 PM    PCP:   Indira Chavarria PA-C  Code Status:  Full Code    Subjective:     C/C:   Chief Complaint   Patient presents with    Chest Pain     States feels like heartburn    Dizziness    Emesis     Interval History Status: not changed.     Patient seen at bedside.  She appears pleasant.  States that she is hungry and wants to eat soon.  Patient does not report any chest pain, cough, shortness of breath, no urinary urgency or hesitancy.  Patient states that she tries to take her insulin on time and is not sure why she keeps getting admitted in the hospital.  Blood pressure has around  systolic.  She is afebrile.  Last 
  GLUCOSE 142* 219*   < > 145* 179* 239* 245*   BUN 4* 4*   < > 2* 2* 4* 7   CREATININE 0.8 0.7   < > 0.7 0.7 0.7 0.7   MG 1.6 2.1  --  2.1  --   --   --    ANIONGAP 11 12   < > 9 11 8* 8*   LABGLOM >90 >90   < > >90 >90 >90 >90   CALCIUM 8.3* 7.7*   < > 8.0* 8.2* 8.3* 8.2*   PHOS 2.2* 3.1  --  1.9*  --   --   --     < > = values in this interval not displayed.     Recent Labs     04/28/24  0733 04/28/24  0752 04/28/24  1919 04/29/24  0810 04/29/24  1157 04/29/24  1505 04/29/24  1558 04/29/24  1955 04/30/24  0818   LIPASE 89*  --   --   --   --  200*  --   --   --    POCGLU  --    < > 249* 179* 142*  --  159* 302* 121*    < > = values in this interval not displayed.     ABG:  Lab Results   Component Value Date/Time    FIO2 INFORMATION NOT PROVIDED 02/08/2024 11:42 PM     Lab Results   Component Value Date/Time    SPECIAL LFT HAND 10ML 04/28/2024 01:23 PM     Lab Results   Component Value Date/Time    CULTURE NO GROWTH 1 DAY 04/28/2024 01:23 PM       Radiology:  No results found.    Physical Examination:        General appearance:  alert, cooperative and no distress  Mental Status:  oriented to person, place and time and normal affect  Lungs:  clear to auscultation bilaterally, normal effort  Heart:  regular rate and rhythm, no murmur  Abdomen:  soft, epigastric abdominal pain, nondistended, normal bowel sounds, no masses, hepatomegaly, splenomegaly  Extremities:  no edema, redness, tenderness in the calves  Skin:  no gross lesions, rashes, induration    Assessment:        Hospital Problems             Last Modified POA    * (Principal) DKA, type 2, not at goal (HCC) 4/26/2024 Yes    High anion gap metabolic acidosis 4/26/2024 Yes    Pseudohyponatremia 4/26/2024 Yes    Pancreatic cyst 4/26/2024 Yes    Pancreatic mass 4/26/2024 Yes    Chronic pancreatitis (HCC) 4/26/2024 Yes    Type 2 diabetes mellitus with hyperglycemia (HCC) 4/26/2024 Yes    Recurrent major depressive disorder (HCC) 4/26/2024 Yes    CKD (chronic

## 2024-04-30 NOTE — DISCHARGE INSTR - COC
necrosis K85.11    Hyperglycemia R73.9    Acute hypokalemia E87.6    Chronic anticoagulation Z79.01    Obesity (BMI 30-39.9) E66.9    CEA elevated R97.0    Alcohol-induced acute pancreatitis K85.20       Isolation/Infection:   Isolation            No Isolation          Patient Infection Status       None to display            Nurse Assessment:  Last Vital Signs: BP 98/66   Pulse 70   Temp 96.8 °F (36 °C) (Temporal)   Resp 21   Ht 1.7 m (5' 6.93\")   Wt 99.6 kg (219 lb 9.3 oz)   SpO2 100%   BMI 34.46 kg/m²     Last documented pain score (0-10 scale): Pain Level: 3  Last Weight:   Wt Readings from Last 1 Encounters:   04/30/24 99.6 kg (219 lb 9.3 oz)     Mental Status:  {IP PT MENTAL STATUS:20030}    IV Access:  { NA IV ACCESS:232896600}    Nursing Mobility/ADLs:  Walking   {CHP DME ADLs:713697243}  Transfer  {CHP DME ADLs:694939489}  Bathing  {CHP DME ADLs:581434383}  Dressing  {CHP DME ADLs:594977719}  Toileting  {CHP DME ADLs:292161534}  Feeding  {CHP DME ADLs:723963877}  Med Admin  {P DME ADLs:018555285}  Med Delivery   { NA MED Delivery:061032650}    Wound Care Documentation and Therapy:        Elimination:  Continence:   Bowel: {YES / NO:19727}  Bladder: {YES / NO:19727}  Urinary Catheter: {Urinary Catheter:030416695}   Colostomy/Ileostomy/Ileal Conduit: {YES / NO:19727}       Date of Last BM: ***  No intake or output data in the 24 hours ending 04/30/24 1651  No intake/output data recorded.    Safety Concerns:     { NA Safety Concerns:105379891}    Impairments/Disabilities:      { NA Impairments/Disabilities:422776281}    Nutrition Therapy:  Current Nutrition Therapy:   { NA Diet List:191868061}    Routes of Feeding: {CHP DME Other Feedings:317482339}  Liquids: {Slp liquid thickness:79120}  Daily Fluid Restriction: {CHP DME Yes amt example:444775856}  Last Modified Barium Swallow with Video (Video Swallowing Test): {Done Not Done Date:304088012}    Treatments at the Time of Hospital

## 2024-04-30 NOTE — PLAN OF CARE
Problem: Discharge Planning  Goal: Discharge to home or other facility with appropriate resources  4/30/2024 0913 by Grisel Han RN  Outcome: Progressing  4/30/2024 0029 by Kalpana Aguilar RN  Outcome: Progressing     Problem: Safety - Adult  Goal: Free from fall injury  4/30/2024 0913 by Grisel Han RN  Outcome: Progressing  4/30/2024 0029 by Kalpana Aguilar RN  Outcome: Progressing     Problem: Chronic Conditions and Co-morbidities  Goal: Patient's chronic conditions and co-morbidity symptoms are monitored and maintained or improved  4/30/2024 0913 by Grisel Han RN  Outcome: Progressing  4/30/2024 0029 by Kalpana Aguilar RN  Outcome: Progressing     Problem: ABCDS Injury Assessment  Goal: Absence of physical injury  4/30/2024 0913 by Grisel Han RN  Outcome: Progressing  4/30/2024 0029 by Kalpana Aguilar RN  Outcome: Progressing     Problem: Pain  Goal: Verbalizes/displays adequate comfort level or baseline comfort level  Outcome: Progressing

## 2024-04-30 NOTE — CARE COORDINATION
Transitional Planning  Met with patient at bedside to get HHC choices. Patient had not reviewed previously.  Patient reviewed Choice list with star ratings and QR code and picked [facility].  E faxed referral.                       Post Acute Facility/Agency List     Provided patient with the following list, the list includes the overall star ratings obtained from CMS per the Medicare Web site (www.Medicare.gov):     [] Long Term Acute Care Facilities  [] Acute Inpatient Rehabilitation Facilities  [] Skilled Nursing Facilities  [x] Home Care    Provided verbal instructions on how to utilize the QR Code to obtain additional detailed star ratings from www.Medicare.gov     offered to print and provide the detailed list:    []Accepted   [x]Declined    1630 Patient left without HHC being completed.  [facility] accepted.  No HHO, No NA completed.    VM left With Grisel at [facility] No HHO, No NA completed.

## 2024-04-30 NOTE — DISCHARGE SUMMARY
Final Specialist Recommendations/Findings:   IP CONSULT TO HOSPITALIST  IP CONSULT TO DIABETES EDUCATOR  IP CONSULT TO DIETITIAN      The patient was seen and examined on day of discharge and this discharge summary is in conjunction with any daily progress note from day of discharge.    Discharge plan:     Disposition: Home    Physician Follow Up:     [facility]  [address]  [place] [address] [phone]  Follow up  We are happy to see Ruth for additional diabetes education as an outpatiant. A referral is needed.       Requiring Further Evaluation/Follow Up POST HOSPITALIZATION/Incidental Findings:     Diet: low fat, low cholesterol diet    Activity: As tolerated    Instructions to Patient:   - Take all medications as prescribed  - Follow up with PCP in 1-2 weeks   - In case of any worsening condition please visit Emergency Room.      Discharge Medications:      Medication List        START taking these medications      cephALEXin 500 MG capsule  Commonly known as: KEFLEX  Take 1 capsule by mouth every 12 hours for 1 day     ondansetron 4 MG tablet  Commonly known as: ZOFRAN  Take 1 tablet by mouth 3 times daily as needed for Nausea or Vomiting     oxyCODONE 5 MG immediate release tablet  Commonly known as: Roxicodone  Take 1 tablet by mouth every 6 hours as needed for Pain for up to 3 days. Intended supply: 3 days. Take lowest dose possible to manage pain Max Daily Amount: 20 mg     * rivaroxaban 15 MG Tabs tablet  Commonly known as: XARELTO  Take 1 tablet by mouth 2 times daily (with meals) for 40 doses     * Xarelto 10 MG Tabs tablet  Generic drug: rivaroxaban  Take 2 tablets by mouth Daily with supper  Start taking on: May 21, 2024     Zenpep [id number] units Cpep  Generic drug: Pancrelipase (Lip-Prot-Amyl)  Take 1 tablet by mouth 3 times daily (with meals)           * This list has 2 medication(s) that are the same as other medications prescribed for

## 2024-04-30 NOTE — PLAN OF CARE
Problem: Discharge Planning  Goal: Discharge to home or other facility with appropriate resources  4/30/2024 1628 by Grisel Han RN  Outcome: Completed  4/30/2024 0913 by Grisel Han RN  Outcome: Progressing     Problem: Safety - Adult  Goal: Free from fall injury  4/30/2024 1628 by Grisel Han RN  Outcome: Completed  4/30/2024 0913 by Grisel Han RN  Outcome: Progressing     Problem: Chronic Conditions and Co-morbidities  Goal: Patient's chronic conditions and co-morbidity symptoms are monitored and maintained or improved  4/30/2024 1628 by Grisel Han RN  Outcome: Completed  4/30/2024 0913 by Grisel Han RN  Outcome: Progressing     Problem: ABCDS Injury Assessment  Goal: Absence of physical injury  4/30/2024 1628 by Grisel Han RN  Outcome: Completed  4/30/2024 0913 by Grisel Han RN  Outcome: Progressing     Problem: Pain  Goal: Verbalizes/displays adequate comfort level or baseline comfort level  4/30/2024 1628 by Grisel Han RN  Outcome: Completed  4/30/2024 0913 by Grisel Han RN  Outcome: Progressing

## 2024-05-03 LAB
MICROORGANISM SPEC CULT: NORMAL
SERVICE CMNT-IMP: NORMAL
SPECIMEN DESCRIPTION: NORMAL

## 2024-05-17 NOTE — TELEPHONE ENCOUNTER
Left Voicemail (2nd Attempt) and Sent Mychart (2nd Attempt) for the patient to call back and schedule the following:    Appointment type: Return Adult Eye  Provider: Dr. Camarena  Return date: 11/17/2024  Specialty phone number: [phone]  Additional appointment(s) needed:   Additonal Notes:     2nd attempt to schedule one year follow up.    Also sent a magnetUt message,    Nita TORRES/Arian Procedure    [facility]   Neurology, NeuroSurgery, NeuroPsychology, Pain Management and Cardiology Specialties  Medical/Surgical Adult Specialties

## 2024-05-18 ENCOUNTER — HOSPITAL ENCOUNTER (INPATIENT)
Age: 34
LOS: 3 days | Discharge: HOME | DRG: 638 | End: 2024-05-21
Attending: EMERGENCY MEDICINE | Admitting: STUDENT IN AN ORGANIZED HEALTH CARE EDUCATION/TRAINING PROGRAM
Payer: MEDICARE

## 2024-05-18 ENCOUNTER — APPOINTMENT (OUTPATIENT)
Dept: GENERAL RADIOLOGY | Age: 34
DRG: 638 | End: 2024-05-18
Payer: MEDICARE

## 2024-05-18 DIAGNOSIS — K86.1 CHRONIC BILIARY PANCREATITIS (HCC): ICD-10-CM

## 2024-05-18 DIAGNOSIS — N39.0 ACUTE UTI: ICD-10-CM

## 2024-05-18 DIAGNOSIS — E11.10 DIABETIC KETOACIDOSIS WITHOUT COMA ASSOCIATED WITH TYPE 2 DIABETES MELLITUS (HCC): Primary | ICD-10-CM

## 2024-05-18 LAB
ALBUMIN SERPL-MCNC: 3.9 G/DL (ref 3.5–5.2)
ALBUMIN/GLOB SERPL: 1 {RATIO} (ref 1–2.5)
ALP SERPL-CCNC: 128 U/L (ref 35–104)
ALT SERPL-CCNC: <5 U/L (ref 10–35)
ANION GAP SERPL CALCULATED.3IONS-SCNC: 21 MMOL/L (ref 9–16)
AST SERPL-CCNC: 14 U/L (ref 10–35)
B-OH-BUTYR SERPL-MCNC: 4.3 MMOL/L (ref 0.02–0.27)
BACTERIA URNS QL MICRO: ABNORMAL
BASOPHILS # BLD: 0.05 K/UL (ref 0–0.2)
BASOPHILS NFR BLD: 1 % (ref 0–2)
BILIRUB DIRECT SERPL-MCNC: <0.2 MG/DL (ref 0–0.3)
BILIRUB INDIRECT SERPL-MCNC: ABNORMAL MG/DL (ref 0–1)
BILIRUB SERPL-MCNC: 0.2 MG/DL (ref 0–1.2)
BILIRUB UR QL STRIP: NEGATIVE
BUN BLD-MCNC: 9 MG/DL (ref 8–26)
BUN SERPL-MCNC: 8 MG/DL (ref 6–20)
CA-I BLD-SCNC: 1.18 MMOL/L (ref 1.15–1.33)
CALCIUM SERPL-MCNC: 9.2 MG/DL (ref 8.6–10.4)
CASTS #/AREA URNS LPF: ABNORMAL /LPF (ref 0–2)
CASTS #/AREA URNS LPF: ABNORMAL /LPF (ref 0–2)
CHLORIDE BLD-SCNC: 104 MMOL/L (ref 98–107)
CHLORIDE SERPL-SCNC: 96 MMOL/L (ref 98–107)
CLARITY UR: ABNORMAL
CO2 BLD CALC-SCNC: 15 MMOL/L (ref 22–30)
CO2 SERPL-SCNC: 12 MMOL/L (ref 20–31)
COLOR UR: YELLOW
CREAT SERPL-MCNC: 1 MG/DL (ref 0.5–0.9)
EGFR, POC: >90 ML/MIN/1.73M2
EOSINOPHIL # BLD: 0.05 K/UL (ref 0–0.44)
EOSINOPHILS RELATIVE PERCENT: 1 % (ref 1–4)
EPI CELLS #/AREA URNS HPF: ABNORMAL /HPF (ref 0–5)
ERYTHROCYTE [DISTWIDTH] IN BLOOD BY AUTOMATED COUNT: 12.9 % (ref 11.8–14.4)
GFR, ESTIMATED: 78 ML/MIN/1.73M2
GLOBULIN SER CALC-MCNC: 3.7 G/DL
GLUCOSE BLD-MCNC: 509 MG/DL (ref 65–105)
GLUCOSE BLD-MCNC: 586 MG/DL (ref 65–105)
GLUCOSE BLD-MCNC: >700 MG/DL (ref 74–100)
GLUCOSE SERPL-MCNC: 622 MG/DL (ref 74–99)
GLUCOSE UR STRIP-MCNC: ABNORMAL MG/DL
HCG SERPL QL: NEGATIVE
HCO3 VENOUS: 14.6 MMOL/L (ref 22–29)
HCT VFR BLD AUTO: 47 % (ref 36.3–47.1)
HCT VFR BLD AUTO: 51 % (ref 36–46)
HGB BLD-MCNC: 15.1 G/DL (ref 11.9–15.1)
HGB UR QL STRIP.AUTO: NEGATIVE
IMM GRANULOCYTES # BLD AUTO: <0.03 K/UL (ref 0–0.3)
IMM GRANULOCYTES NFR BLD: 0 %
KETONES UR STRIP-MCNC: ABNORMAL MG/DL
LEUKOCYTE ESTERASE UR QL STRIP: ABNORMAL
LIPASE SERPL-CCNC: 416 U/L (ref 13–60)
LYMPHOCYTES NFR BLD: 2.61 K/UL (ref 1.1–3.7)
LYMPHOCYTES RELATIVE PERCENT: 36 % (ref 24–43)
MAGNESIUM SERPL-MCNC: 2 MG/DL (ref 1.6–2.6)
MCH RBC QN AUTO: 30.4 PG (ref 25.2–33.5)
MCHC RBC AUTO-ENTMCNC: 32.1 G/DL (ref 28.4–34.8)
MCV RBC AUTO: 94.8 FL (ref 82.6–102.9)
MONOCYTES NFR BLD: 0.47 K/UL (ref 0.1–1.2)
MONOCYTES NFR BLD: 7 % (ref 3–12)
NEGATIVE BASE EXCESS, VEN: 9 MMOL/L (ref 0–2)
NEUTROPHILS NFR BLD: 55 % (ref 36–65)
NEUTS SEG NFR BLD: 4.07 K/UL (ref 1.5–8.1)
NITRITE UR QL STRIP: NEGATIVE
NRBC BLD-RTO: 0 PER 100 WBC
O2 SAT, VEN: 94.6 % (ref 60–85)
PCO2, VEN: 26.8 MM HG (ref 41–51)
PH UR STRIP: 5.5 [PH] (ref 5–8)
PH VENOUS: 7.34 (ref 7.32–7.43)
PLATELET # BLD AUTO: 284 K/UL (ref 138–453)
PMV BLD AUTO: 11.9 FL (ref 8.1–13.5)
PO2, VEN: 75.8 MM HG (ref 30–50)
POC ANION GAP: 14 MMOL/L (ref 7–16)
POC CREATININE: 0.5 MG/DL (ref 0.51–1.19)
POC HEMOGLOBIN (CALC): 17.3 G/DL (ref 12–16)
POC LACTIC ACID: 1.7 MMOL/L (ref 0.56–1.39)
POTASSIUM BLD-SCNC: 4 MMOL/L (ref 3.5–4.5)
POTASSIUM SERPL-SCNC: 4 MMOL/L (ref 3.7–5.3)
PROT SERPL-MCNC: 7.6 G/DL (ref 6.6–8.7)
PROT UR STRIP-MCNC: NEGATIVE MG/DL
RBC # BLD AUTO: 4.96 M/UL (ref 3.95–5.11)
RBC #/AREA URNS HPF: ABNORMAL /HPF (ref 0–2)
SODIUM BLD-SCNC: 131 MMOL/L (ref 138–146)
SODIUM SERPL-SCNC: 129 MMOL/L (ref 136–145)
SP GR UR STRIP: 1.04 (ref 1–1.03)
TROPONIN I SERPL HS-MCNC: <6 NG/L (ref 0–14)
UROBILINOGEN UR STRIP-ACNC: NORMAL EU/DL (ref 0–1)
WBC #/AREA URNS HPF: ABNORMAL /HPF (ref 0–5)
WBC OTHER # BLD: 7.3 K/UL (ref 3.5–11.3)

## 2024-05-18 PROCEDURE — 99285 EMERGENCY DEPT VISIT HI MDM: CPT

## 2024-05-18 PROCEDURE — 71046 X-RAY EXAM CHEST 2 VIEWS: CPT

## 2024-05-18 PROCEDURE — 83735 ASSAY OF MAGNESIUM: CPT

## 2024-05-18 PROCEDURE — 86403 PARTICLE AGGLUT ANTBDY SCRN: CPT

## 2024-05-18 PROCEDURE — 81001 URINALYSIS AUTO W/SCOPE: CPT

## 2024-05-18 PROCEDURE — 2060000000 HC ICU INTERMEDIATE R&B

## 2024-05-18 PROCEDURE — 82803 BLOOD GASES ANY COMBINATION: CPT

## 2024-05-18 PROCEDURE — 93005 ELECTROCARDIOGRAM TRACING: CPT | Performed by: STUDENT IN AN ORGANIZED HEALTH CARE EDUCATION/TRAINING PROGRAM

## 2024-05-18 PROCEDURE — 96375 TX/PRO/DX INJ NEW DRUG ADDON: CPT

## 2024-05-18 PROCEDURE — 82947 ASSAY GLUCOSE BLOOD QUANT: CPT

## 2024-05-18 PROCEDURE — 84484 ASSAY OF TROPONIN QUANT: CPT

## 2024-05-18 PROCEDURE — 84703 CHORIONIC GONADOTROPIN ASSAY: CPT

## 2024-05-18 PROCEDURE — 83605 ASSAY OF LACTIC ACID: CPT

## 2024-05-18 PROCEDURE — 2580000003 HC RX 258: Performed by: STUDENT IN AN ORGANIZED HEALTH CARE EDUCATION/TRAINING PROGRAM

## 2024-05-18 PROCEDURE — 2500000003 HC RX 250 WO HCPCS: Performed by: STUDENT IN AN ORGANIZED HEALTH CARE EDUCATION/TRAINING PROGRAM

## 2024-05-18 PROCEDURE — 96374 THER/PROPH/DIAG INJ IV PUSH: CPT

## 2024-05-18 PROCEDURE — 85025 COMPLETE CBC W/AUTO DIFF WBC: CPT

## 2024-05-18 PROCEDURE — 87086 URINE CULTURE/COLONY COUNT: CPT

## 2024-05-18 PROCEDURE — 82330 ASSAY OF CALCIUM: CPT

## 2024-05-18 PROCEDURE — 83690 ASSAY OF LIPASE: CPT

## 2024-05-18 PROCEDURE — 82010 KETONE BODYS QUAN: CPT

## 2024-05-18 PROCEDURE — 80051 ELECTROLYTE PANEL: CPT

## 2024-05-18 PROCEDURE — 6360000002 HC RX W HCPCS: Performed by: STUDENT IN AN ORGANIZED HEALTH CARE EDUCATION/TRAINING PROGRAM

## 2024-05-18 PROCEDURE — 80048 BASIC METABOLIC PNL TOTAL CA: CPT

## 2024-05-18 PROCEDURE — 85014 HEMATOCRIT: CPT

## 2024-05-18 PROCEDURE — 6370000000 HC RX 637 (ALT 250 FOR IP): Performed by: STUDENT IN AN ORGANIZED HEALTH CARE EDUCATION/TRAINING PROGRAM

## 2024-05-18 PROCEDURE — 82565 ASSAY OF CREATININE: CPT

## 2024-05-18 PROCEDURE — 80076 HEPATIC FUNCTION PANEL: CPT

## 2024-05-18 PROCEDURE — 84520 ASSAY OF UREA NITROGEN: CPT

## 2024-05-18 RX ORDER — DEXTROSE MONOHYDRATE AND SODIUM CHLORIDE 5; .45 G/100ML; G/100ML
INJECTION, SOLUTION INTRAVENOUS CONTINUOUS PRN
Status: DISCONTINUED | OUTPATIENT
Start: 2024-05-18 | End: 2024-05-20

## 2024-05-18 RX ORDER — FAMOTIDINE 10 MG/ML
20 INJECTION, SOLUTION INTRAVENOUS
Status: COMPLETED | OUTPATIENT
Start: 2024-05-18 | End: 2024-05-18

## 2024-05-18 RX ORDER — 0.9 % SODIUM CHLORIDE 0.9 %
1000 INTRAVENOUS SOLUTION INTRAVENOUS ONCE
Status: COMPLETED | OUTPATIENT
Start: 2024-05-18 | End: 2024-05-18

## 2024-05-18 RX ORDER — MAGNESIUM SULFATE IN WATER 40 MG/ML
2000 INJECTION, SOLUTION INTRAVENOUS PRN
Status: DISCONTINUED | OUTPATIENT
Start: 2024-05-18 | End: 2024-05-21 | Stop reason: HOSPADM

## 2024-05-18 RX ORDER — 0.9 % SODIUM CHLORIDE 0.9 %
15 INTRAVENOUS SOLUTION INTRAVENOUS ONCE
Status: COMPLETED | OUTPATIENT
Start: 2024-05-18 | End: 2024-05-19

## 2024-05-18 RX ORDER — POTASSIUM CHLORIDE 7.45 MG/ML
10 INJECTION INTRAVENOUS PRN
Status: DISCONTINUED | OUTPATIENT
Start: 2024-05-18 | End: 2024-05-21 | Stop reason: HOSPADM

## 2024-05-18 RX ORDER — KETOROLAC TROMETHAMINE 30 MG/ML
30 INJECTION, SOLUTION INTRAMUSCULAR; INTRAVENOUS ONCE
Status: COMPLETED | OUTPATIENT
Start: 2024-05-18 | End: 2024-05-18

## 2024-05-18 RX ORDER — SODIUM CHLORIDE 9 MG/ML
INJECTION, SOLUTION INTRAVENOUS CONTINUOUS
Status: DISCONTINUED | OUTPATIENT
Start: 2024-05-18 | End: 2024-05-21 | Stop reason: HOSPADM

## 2024-05-18 RX ORDER — ONDANSETRON 2 MG/ML
4 INJECTION INTRAMUSCULAR; INTRAVENOUS ONCE
Status: COMPLETED | OUTPATIENT
Start: 2024-05-18 | End: 2024-05-18

## 2024-05-18 RX ADMIN — KETOROLAC TROMETHAMINE 30 MG: 30 INJECTION, SOLUTION INTRAMUSCULAR; INTRAVENOUS at 21:29

## 2024-05-18 RX ADMIN — SODIUM CHLORIDE 1000 ML: 9 INJECTION, SOLUTION INTRAVENOUS at 21:29

## 2024-05-18 RX ADMIN — SODIUM CHLORIDE 1490 ML: 9 INJECTION, SOLUTION INTRAVENOUS at 23:35

## 2024-05-18 RX ADMIN — FAMOTIDINE 20 MG: 10 INJECTION, SOLUTION INTRAVENOUS at 21:29

## 2024-05-18 RX ADMIN — SODIUM CHLORIDE 9 UNITS/HR: 9 INJECTION, SOLUTION INTRAVENOUS at 23:39

## 2024-05-18 RX ADMIN — CEFTRIAXONE SODIUM 1000 MG: 1 INJECTION, POWDER, FOR SOLUTION INTRAMUSCULAR; INTRAVENOUS at 22:59

## 2024-05-18 RX ADMIN — ONDANSETRON 4 MG: 2 INJECTION INTRAMUSCULAR; INTRAVENOUS at 21:29

## 2024-05-18 ASSESSMENT — PAIN DESCRIPTION - PAIN TYPE: TYPE: ACUTE PAIN

## 2024-05-18 ASSESSMENT — PAIN SCALES - GENERAL: PAINLEVEL_OUTOF10: 7

## 2024-05-18 ASSESSMENT — PAIN DESCRIPTION - DESCRIPTORS: DESCRIPTORS: ACHING

## 2024-05-18 ASSESSMENT — PAIN - FUNCTIONAL ASSESSMENT: PAIN_FUNCTIONAL_ASSESSMENT: 0-10

## 2024-05-18 ASSESSMENT — PAIN DESCRIPTION - LOCATION: LOCATION: CHEST;ABDOMEN

## 2024-05-19 ENCOUNTER — APPOINTMENT (OUTPATIENT)
Dept: CT IMAGING | Age: 34
DRG: 638 | End: 2024-05-19
Payer: MEDICARE

## 2024-05-19 LAB
ANION GAP SERPL CALCULATED.3IONS-SCNC: 10 MMOL/L (ref 9–16)
ANION GAP SERPL CALCULATED.3IONS-SCNC: 11 MMOL/L (ref 9–16)
ANION GAP SERPL CALCULATED.3IONS-SCNC: 15 MMOL/L (ref 9–16)
ANION GAP SERPL CALCULATED.3IONS-SCNC: 9 MMOL/L (ref 9–16)
ANTI-XA UNFRAC HEPARIN: 1.81 IU/L
BASOPHILS # BLD: 0.06 K/UL (ref 0–0.2)
BASOPHILS NFR BLD: 1 % (ref 0–2)
BUN SERPL-MCNC: 4 MG/DL (ref 6–20)
BUN SERPL-MCNC: 4 MG/DL (ref 6–20)
BUN SERPL-MCNC: 6 MG/DL (ref 6–20)
BUN SERPL-MCNC: 7 MG/DL (ref 6–20)
CALCIUM SERPL-MCNC: 8 MG/DL (ref 8.6–10.4)
CALCIUM SERPL-MCNC: 8 MG/DL (ref 8.6–10.4)
CALCIUM SERPL-MCNC: 8.1 MG/DL (ref 8.6–10.4)
CALCIUM SERPL-MCNC: 8.2 MG/DL (ref 8.6–10.4)
CHLORIDE SERPL-SCNC: 108 MMOL/L (ref 98–107)
CHLORIDE SERPL-SCNC: 109 MMOL/L (ref 98–107)
CHLORIDE SERPL-SCNC: 109 MMOL/L (ref 98–107)
CHLORIDE SERPL-SCNC: 111 MMOL/L (ref 98–107)
CO2 SERPL-SCNC: 15 MMOL/L (ref 20–31)
CO2 SERPL-SCNC: 15 MMOL/L (ref 20–31)
CO2 SERPL-SCNC: 18 MMOL/L (ref 20–31)
CO2 SERPL-SCNC: 18 MMOL/L (ref 20–31)
CREAT SERPL-MCNC: 0.7 MG/DL (ref 0.5–0.9)
CREAT SERPL-MCNC: 0.8 MG/DL (ref 0.5–0.9)
EOSINOPHIL # BLD: 0.13 K/UL (ref 0–0.44)
EOSINOPHILS RELATIVE PERCENT: 2 % (ref 1–4)
ERYTHROCYTE [DISTWIDTH] IN BLOOD BY AUTOMATED COUNT: 12.8 % (ref 11.8–14.4)
ERYTHROCYTE [DISTWIDTH] IN BLOOD BY AUTOMATED COUNT: 12.8 % (ref 11.8–14.4)
ERYTHROCYTE [DISTWIDTH] IN BLOOD BY AUTOMATED COUNT: 13.2 % (ref 11.8–14.4)
GFR, ESTIMATED: >90 ML/MIN/1.73M2
GLUCOSE BLD-MCNC: 124 MG/DL (ref 65–105)
GLUCOSE BLD-MCNC: 136 MG/DL (ref 65–105)
GLUCOSE BLD-MCNC: 157 MG/DL (ref 65–105)
GLUCOSE BLD-MCNC: 158 MG/DL (ref 65–105)
GLUCOSE BLD-MCNC: 185 MG/DL (ref 65–105)
GLUCOSE BLD-MCNC: 188 MG/DL (ref 65–105)
GLUCOSE BLD-MCNC: 193 MG/DL (ref 65–105)
GLUCOSE BLD-MCNC: 198 MG/DL (ref 65–105)
GLUCOSE BLD-MCNC: 207 MG/DL (ref 65–105)
GLUCOSE BLD-MCNC: 209 MG/DL (ref 65–105)
GLUCOSE BLD-MCNC: 252 MG/DL (ref 65–105)
GLUCOSE BLD-MCNC: 256 MG/DL (ref 65–105)
GLUCOSE BLD-MCNC: 286 MG/DL (ref 65–105)
GLUCOSE BLD-MCNC: 289 MG/DL (ref 65–105)
GLUCOSE SERPL-MCNC: 220 MG/DL (ref 74–99)
GLUCOSE SERPL-MCNC: 220 MG/DL (ref 74–99)
GLUCOSE SERPL-MCNC: 320 MG/DL (ref 74–99)
GLUCOSE SERPL-MCNC: 320 MG/DL (ref 74–99)
HCT VFR BLD AUTO: 38 % (ref 36.3–47.1)
HCT VFR BLD AUTO: 39.7 % (ref 36.3–47.1)
HCT VFR BLD AUTO: 43.1 % (ref 36.3–47.1)
HGB BLD-MCNC: 12.2 G/DL (ref 11.9–15.1)
HGB BLD-MCNC: 12.6 G/DL (ref 11.9–15.1)
HGB BLD-MCNC: 13.2 G/DL (ref 11.9–15.1)
IMM GRANULOCYTES # BLD AUTO: 0.05 K/UL (ref 0–0.3)
IMM GRANULOCYTES NFR BLD: 1 %
INR PPP: 1.1
LYMPHOCYTES NFR BLD: 3.12 K/UL (ref 1.1–3.7)
LYMPHOCYTES RELATIVE PERCENT: 46 % (ref 24–43)
MAGNESIUM SERPL-MCNC: 1.7 MG/DL (ref 1.6–2.6)
MAGNESIUM SERPL-MCNC: 1.7 MG/DL (ref 1.6–2.6)
MAGNESIUM SERPL-MCNC: 1.9 MG/DL (ref 1.6–2.6)
MAGNESIUM SERPL-MCNC: 1.9 MG/DL (ref 1.6–2.6)
MCH RBC QN AUTO: 30.1 PG (ref 25.2–33.5)
MCH RBC QN AUTO: 30.1 PG (ref 25.2–33.5)
MCH RBC QN AUTO: 30.5 PG (ref 25.2–33.5)
MCHC RBC AUTO-ENTMCNC: 30.6 G/DL (ref 28.4–34.8)
MCHC RBC AUTO-ENTMCNC: 30.7 G/DL (ref 28.4–34.8)
MCHC RBC AUTO-ENTMCNC: 33.2 G/DL (ref 28.4–34.8)
MCV RBC AUTO: 90.7 FL (ref 82.6–102.9)
MCV RBC AUTO: 98 FL (ref 82.6–102.9)
MCV RBC AUTO: 99.5 FL (ref 82.6–102.9)
MICROORGANISM SPEC CULT: ABNORMAL
MONOCYTES NFR BLD: 0.52 K/UL (ref 0.1–1.2)
MONOCYTES NFR BLD: 8 % (ref 3–12)
NEUTROPHILS NFR BLD: 43 % (ref 36–65)
NEUTS SEG NFR BLD: 2.88 K/UL (ref 1.5–8.1)
NRBC BLD-RTO: 0 PER 100 WBC
PARTIAL THROMBOPLASTIN TIME: >180 SEC (ref 23–36.5)
PHOSPHATE SERPL-MCNC: 1.8 MG/DL (ref 2.5–4.5)
PHOSPHATE SERPL-MCNC: 2 MG/DL (ref 2.5–4.5)
PHOSPHATE SERPL-MCNC: 2 MG/DL (ref 2.5–4.5)
PHOSPHATE SERPL-MCNC: 2.8 MG/DL (ref 2.5–4.5)
PLATELET # BLD AUTO: 176 K/UL (ref 138–453)
PLATELET # BLD AUTO: 201 K/UL (ref 138–453)
PLATELET # BLD AUTO: 212 K/UL (ref 138–453)
PMV BLD AUTO: 11.1 FL (ref 8.1–13.5)
PMV BLD AUTO: 11.4 FL (ref 8.1–13.5)
PMV BLD AUTO: 12.2 FL (ref 8.1–13.5)
POTASSIUM SERPL-SCNC: 3.3 MMOL/L (ref 3.7–5.3)
POTASSIUM SERPL-SCNC: 3.6 MMOL/L (ref 3.7–5.3)
POTASSIUM SERPL-SCNC: 3.7 MMOL/L (ref 3.7–5.3)
POTASSIUM SERPL-SCNC: 3.9 MMOL/L (ref 3.7–5.3)
PROTHROMBIN TIME: 14.1 SEC (ref 11.7–14.9)
RBC # BLD AUTO: 4.05 M/UL (ref 3.95–5.11)
RBC # BLD AUTO: 4.19 M/UL (ref 3.95–5.11)
RBC # BLD AUTO: 4.33 M/UL (ref 3.95–5.11)
SODIUM SERPL-SCNC: 136 MMOL/L (ref 136–145)
SODIUM SERPL-SCNC: 137 MMOL/L (ref 136–145)
SODIUM SERPL-SCNC: 137 MMOL/L (ref 136–145)
SODIUM SERPL-SCNC: 138 MMOL/L (ref 136–145)
SPECIMEN DESCRIPTION: ABNORMAL
WBC OTHER # BLD: 6.6 K/UL (ref 3.5–11.3)
WBC OTHER # BLD: 6.8 K/UL (ref 3.5–11.3)
WBC OTHER # BLD: 7 K/UL (ref 3.5–11.3)

## 2024-05-19 PROCEDURE — 6360000002 HC RX W HCPCS: Performed by: NURSE PRACTITIONER

## 2024-05-19 PROCEDURE — 85610 PROTHROMBIN TIME: CPT

## 2024-05-19 PROCEDURE — 6370000000 HC RX 637 (ALT 250 FOR IP): Performed by: INTERNAL MEDICINE

## 2024-05-19 PROCEDURE — 2060000000 HC ICU INTERMEDIATE R&B

## 2024-05-19 PROCEDURE — 84100 ASSAY OF PHOSPHORUS: CPT

## 2024-05-19 PROCEDURE — 99223 1ST HOSP IP/OBS HIGH 75: CPT | Performed by: INTERNAL MEDICINE

## 2024-05-19 PROCEDURE — 6370000000 HC RX 637 (ALT 250 FOR IP): Performed by: STUDENT IN AN ORGANIZED HEALTH CARE EDUCATION/TRAINING PROGRAM

## 2024-05-19 PROCEDURE — 83735 ASSAY OF MAGNESIUM: CPT

## 2024-05-19 PROCEDURE — 6360000004 HC RX CONTRAST MEDICATION: Performed by: INTERNAL MEDICINE

## 2024-05-19 PROCEDURE — 85730 THROMBOPLASTIN TIME PARTIAL: CPT

## 2024-05-19 PROCEDURE — 83036 HEMOGLOBIN GLYCOSYLATED A1C: CPT

## 2024-05-19 PROCEDURE — 71260 CT THORAX DX C+: CPT

## 2024-05-19 PROCEDURE — 2500000003 HC RX 250 WO HCPCS: Performed by: NURSE PRACTITIONER

## 2024-05-19 PROCEDURE — 80048 BASIC METABOLIC PNL TOTAL CA: CPT

## 2024-05-19 PROCEDURE — 85520 HEPARIN ASSAY: CPT

## 2024-05-19 PROCEDURE — 85025 COMPLETE CBC W/AUTO DIFF WBC: CPT

## 2024-05-19 PROCEDURE — 85027 COMPLETE CBC AUTOMATED: CPT

## 2024-05-19 PROCEDURE — 74177 CT ABD & PELVIS W/CONTRAST: CPT

## 2024-05-19 PROCEDURE — 2580000003 HC RX 258: Performed by: NURSE PRACTITIONER

## 2024-05-19 PROCEDURE — 36415 COLL VENOUS BLD VENIPUNCTURE: CPT

## 2024-05-19 PROCEDURE — 6360000002 HC RX W HCPCS: Performed by: INTERNAL MEDICINE

## 2024-05-19 PROCEDURE — 82947 ASSAY GLUCOSE BLOOD QUANT: CPT

## 2024-05-19 RX ORDER — MORPHINE SULFATE 2 MG/ML
2 INJECTION, SOLUTION INTRAMUSCULAR; INTRAVENOUS ONCE
Status: COMPLETED | OUTPATIENT
Start: 2024-05-19 | End: 2024-05-19

## 2024-05-19 RX ORDER — HEPARIN SODIUM 1000 [USP'U]/ML
80 INJECTION, SOLUTION INTRAVENOUS; SUBCUTANEOUS ONCE
Status: DISCONTINUED | OUTPATIENT
Start: 2024-05-19 | End: 2024-05-19

## 2024-05-19 RX ORDER — DEXTROSE MONOHYDRATE AND SODIUM CHLORIDE 5; .45 G/100ML; G/100ML
INJECTION, SOLUTION INTRAVENOUS CONTINUOUS PRN
Status: DISCONTINUED | OUTPATIENT
Start: 2024-05-19 | End: 2024-05-21 | Stop reason: HOSPADM

## 2024-05-19 RX ORDER — SODIUM CHLORIDE 450 MG/100ML
INJECTION, SOLUTION INTRAVENOUS CONTINUOUS
Status: DISCONTINUED | OUTPATIENT
Start: 2024-05-19 | End: 2024-05-21 | Stop reason: HOSPADM

## 2024-05-19 RX ORDER — HEPARIN SODIUM 1000 [USP'U]/ML
80 INJECTION, SOLUTION INTRAVENOUS; SUBCUTANEOUS ONCE
Status: COMPLETED | OUTPATIENT
Start: 2024-05-19 | End: 2024-05-19

## 2024-05-19 RX ORDER — MAGNESIUM SULFATE 1 G/100ML
1000 INJECTION INTRAVENOUS PRN
Status: DISCONTINUED | OUTPATIENT
Start: 2024-05-19 | End: 2024-05-21 | Stop reason: HOSPADM

## 2024-05-19 RX ORDER — INSULIN GLARGINE 100 [IU]/ML
10 INJECTION, SOLUTION SUBCUTANEOUS ONCE
Status: COMPLETED | OUTPATIENT
Start: 2024-05-19 | End: 2024-05-19

## 2024-05-19 RX ORDER — HEPARIN SODIUM 1000 [USP'U]/ML
40 INJECTION, SOLUTION INTRAVENOUS; SUBCUTANEOUS PRN
Status: DISCONTINUED | OUTPATIENT
Start: 2024-05-19 | End: 2024-05-20

## 2024-05-19 RX ORDER — POLYETHYLENE GLYCOL 3350 17 G/17G
17 POWDER, FOR SOLUTION ORAL DAILY PRN
Status: DISCONTINUED | OUTPATIENT
Start: 2024-05-19 | End: 2024-05-21 | Stop reason: HOSPADM

## 2024-05-19 RX ORDER — INSULIN GLARGINE 100 [IU]/ML
10 INJECTION, SOLUTION SUBCUTANEOUS NIGHTLY
Status: DISCONTINUED | OUTPATIENT
Start: 2024-05-19 | End: 2024-05-20

## 2024-05-19 RX ORDER — HEPARIN SODIUM 1000 [USP'U]/ML
40 INJECTION, SOLUTION INTRAVENOUS; SUBCUTANEOUS PRN
Status: DISCONTINUED | OUTPATIENT
Start: 2024-05-19 | End: 2024-05-19

## 2024-05-19 RX ORDER — INSULIN LISPRO 100 [IU]/ML
0-4 INJECTION, SOLUTION INTRAVENOUS; SUBCUTANEOUS NIGHTLY
Status: DISCONTINUED | OUTPATIENT
Start: 2024-05-19 | End: 2024-05-21 | Stop reason: HOSPADM

## 2024-05-19 RX ORDER — ENOXAPARIN SODIUM 100 MG/ML
40 INJECTION SUBCUTANEOUS DAILY
Status: DISCONTINUED | OUTPATIENT
Start: 2024-05-19 | End: 2024-05-19

## 2024-05-19 RX ORDER — POTASSIUM CHLORIDE 7.45 MG/ML
10 INJECTION INTRAVENOUS PRN
Status: DISCONTINUED | OUTPATIENT
Start: 2024-05-19 | End: 2024-05-21 | Stop reason: HOSPADM

## 2024-05-19 RX ORDER — HEPARIN SODIUM 10000 [USP'U]/100ML
5-30 INJECTION, SOLUTION INTRAVENOUS CONTINUOUS
Status: DISCONTINUED | OUTPATIENT
Start: 2024-05-19 | End: 2024-05-19

## 2024-05-19 RX ORDER — HEPARIN SODIUM 1000 [USP'U]/ML
80 INJECTION, SOLUTION INTRAVENOUS; SUBCUTANEOUS PRN
Status: DISCONTINUED | OUTPATIENT
Start: 2024-05-19 | End: 2024-05-19

## 2024-05-19 RX ORDER — HEPARIN SODIUM 1000 [USP'U]/ML
80 INJECTION, SOLUTION INTRAVENOUS; SUBCUTANEOUS PRN
Status: DISCONTINUED | OUTPATIENT
Start: 2024-05-19 | End: 2024-05-20

## 2024-05-19 RX ORDER — INSULIN LISPRO 100 [IU]/ML
0-16 INJECTION, SOLUTION INTRAVENOUS; SUBCUTANEOUS
Status: DISCONTINUED | OUTPATIENT
Start: 2024-05-19 | End: 2024-05-21 | Stop reason: HOSPADM

## 2024-05-19 RX ORDER — 0.9 % SODIUM CHLORIDE 0.9 %
15 INTRAVENOUS SOLUTION INTRAVENOUS ONCE
Status: DISCONTINUED | OUTPATIENT
Start: 2024-05-19 | End: 2024-05-21 | Stop reason: HOSPADM

## 2024-05-19 RX ADMIN — POTASSIUM CHLORIDE 10 MEQ: 7.46 INJECTION, SOLUTION INTRAVENOUS at 05:23

## 2024-05-19 RX ADMIN — POTASSIUM CHLORIDE 10 MEQ: 7.46 INJECTION, SOLUTION INTRAVENOUS at 07:52

## 2024-05-19 RX ADMIN — IOPAMIDOL 75 ML: 755 INJECTION, SOLUTION INTRAVENOUS at 08:23

## 2024-05-19 RX ADMIN — RIVAROXABAN 20 MG: 20 TABLET, FILM COATED ORAL at 18:24

## 2024-05-19 RX ADMIN — MORPHINE SULFATE 2 MG: 2 INJECTION, SOLUTION INTRAMUSCULAR; INTRAVENOUS at 20:38

## 2024-05-19 RX ADMIN — Medication 1000 MG: at 23:21

## 2024-05-19 RX ADMIN — INSULIN GLARGINE 10 UNITS: 100 INJECTION, SOLUTION SUBCUTANEOUS at 20:23

## 2024-05-19 RX ADMIN — SODIUM PHOSPHATE, MONOBASIC, MONOHYDRATE AND SODIUM PHOSPHATE, DIBASIC, ANHYDROUS 15 MMOL: 276; 142 INJECTION, SOLUTION INTRAVENOUS at 06:37

## 2024-05-19 RX ADMIN — HEPARIN SODIUM 18 UNITS/KG/HR: 10000 INJECTION, SOLUTION INTRAVENOUS at 07:04

## 2024-05-19 RX ADMIN — INSULIN LISPRO 8 UNITS: 100 INJECTION, SOLUTION INTRAVENOUS; SUBCUTANEOUS at 18:24

## 2024-05-19 RX ADMIN — LIDOCAINE HYDROCHLORIDE: 20 SOLUTION ORAL at 11:19

## 2024-05-19 RX ADMIN — POTASSIUM CHLORIDE 10 MEQ: 7.46 INJECTION, SOLUTION INTRAVENOUS at 06:29

## 2024-05-19 RX ADMIN — POTASSIUM CHLORIDE 10 MEQ: 7.46 INJECTION, SOLUTION INTRAVENOUS at 10:17

## 2024-05-19 RX ADMIN — DEXTROSE AND SODIUM CHLORIDE: 5; 450 INJECTION, SOLUTION INTRAVENOUS at 01:51

## 2024-05-19 RX ADMIN — INSULIN GLARGINE 10 UNITS: 100 INJECTION, SOLUTION SUBCUTANEOUS at 13:29

## 2024-05-19 RX ADMIN — HEPARIN SODIUM 7700 UNITS: 1000 INJECTION INTRAVENOUS; SUBCUTANEOUS at 07:01

## 2024-05-19 ASSESSMENT — PAIN DESCRIPTION - ORIENTATION: ORIENTATION: LEFT;UPPER

## 2024-05-19 ASSESSMENT — PAIN DESCRIPTION - LOCATION
LOCATION: ABDOMEN
LOCATION: ABDOMEN

## 2024-05-19 ASSESSMENT — PAIN DESCRIPTION - DESCRIPTORS: DESCRIPTORS: ACHING

## 2024-05-19 ASSESSMENT — PAIN SCALES - GENERAL
PAINLEVEL_OUTOF10: 6
PAINLEVEL_OUTOF10: 7

## 2024-05-19 ASSESSMENT — PAIN DESCRIPTION - PAIN TYPE: TYPE: CHRONIC PAIN;ACUTE PAIN

## 2024-05-19 NOTE — ED PROVIDER NOTES
[facility] ED     Emergency Department     Faculty Attestation        I performed a history and physical examination of the patient and discussed management with the resident. I reviewed the resident’s note and agree with the documented findings and plan of care. Any areas of disagreement are noted on the chart. I was personally present for the key portions of any procedures. I have documented in the chart those procedures where I was not present during the key portions. I have reviewed the emergency nurses triage note. I agree with the chief complaint, past medical history, past surgical history, allergies, medications, social and family history as documented unless otherwise noted below.  For Physician Assistant/ Nurse Practitioner cases/documentation I have personally evaluated this patient and have completed at least one if not all key elements of the E/M (history, physical exam, and MDM). Additional findings are as noted.      Vital Signs: BP: 118/85  Pulse: 91  Respirations: 11  Temp: 97.8 °F (36.6 °C) SpO2: 97 %  PCP:  Indira Chavarria PA-C  Note Started: 5/18/24, 9:09 PM EDT    Pertinent Comments:     Patient is a 33-year-old female with long history of insulin-dependent diabetes as well as pancreatitis here with possible pancreatitis blood sugars are running \"low\" for her.   On exam has some epigastric pain with some back pain but no bruising on the flank or periumbilically.   Some chest pain as well but no shortness of breath associated.    Assessment/Plan: Patient with very possibly repeat pancreatitis however must consider DKA or other etiologies as well.   Will obtain relatively broad workup as well as attempt symptomatic control and reevaluate after          EKG Interpretation    Interpreted by emergency department physician    Rhythm: normal sinus   Rate: normal at 93 bpm  Axis: normal  Conduction: normal  ST Segments: no acute change  T

## 2024-05-19 NOTE — PROGRESS NOTES
Writer returned with patient from CT scan.  Sugar re-checked and adjusted.  Pt resting in bed with call  light in reach.  All infusions running smoothly.

## 2024-05-19 NOTE — ED NOTES
ED to inpatient nurses report      Chief Complaint:  Chief Complaint   Patient presents with    Chest Pain    Abdominal Pain     States that she has been having discomfort for the last few days      Present to ED from: home     MOA:     LOC: alert and orientated to name, place, date  Mobility: Independent  Oxygen Baseline: room air     Current needs required:   Pending ED orders:   Present condition: resting in cot, talking w/ family NAD     Why did the patient come to the ED? Chest and abd pain   What is the plan? Insulin drip   Any procedures or intervention occur?   Any safety concerns??    Mental Status:  Level of Consciousness: Alert (0)    Psych Assessment:      Vital signs   Vitals:    05/18/24 2105   BP: 118/85   Pulse: 91   Resp: 11   Temp: 97.8 °F (36.6 °C)   TempSrc: Oral   SpO2: 97%   Weight: 99.3 kg (219 lb)        Vitals:  Patient Vitals for the past 24 hrs:   BP Temp Temp src Pulse Resp SpO2 Weight   05/18/24 2105 118/85 97.8 °F (36.6 °C) Oral 91 11 97 % 99.3 kg (219 lb)      Visit Vitals  /85   Pulse 91   Temp 97.8 °F (36.6 °C) (Oral)   Resp 11   Wt 99.3 kg (219 lb)   SpO2 97%   BMI 34.37 kg/m²        LDAs:   Peripheral IV 05/18/24 Distal;Left Forearm (Active)   Site Assessment Clean, dry & intact 05/18/24 2117   Line Status Blood return noted;Normal saline locked;Specimen collected 05/18/24 2117   Phlebitis Assessment No symptoms 05/18/24 2117   Infiltration Assessment 0 05/18/24 2117   Dressing Status New dressing applied 05/18/24 2117   Dressing Type Transparent 05/18/24 2117   Dressing Intervention New 05/18/24 2117       Ambulatory Status:  No data recorded    Diagnosis:  DISPOSITION Admitted 05/18/2024 10:53:10 PM   Final diagnoses:   Diabetic ketoacidosis without coma associated with type 2 diabetes mellitus (HCC)   Acute UTI        Consults:  IP CONSULT TO HOSPITALIST  IP CONSULT TO DIABETES EDUCATOR     Treatment Team:   Treatment Team: Attending Provider: Tiffanie Campos MD;  Registered Nurse: Carina Escobedo RN; Resident: Shawanda Alberto DO; Consulting Physician: Schwab, Tracy, APRN - CNP    Treatment:  ED Course as of 05/18/24 2321   Sat May 18, 2024   2144 POC Glucose(!!): 586  Elevated, will add on point-of-care labs to check VBG [AB]   2206 WBC: 7.3 [AB]   2206 Hemoglobin Quant: 15.1 [AB]   2217 Ketones, Urine(!): LARGE [AB]   2217 Preg, Serum: NEGATIVE [AB]   2217 pH, Tadeo: 7.345 [AB]   2217 HCO3, Venous(!): 14.6  Consistent with early DKA [AB]   2227 Glucose(!!): 622  Significantly elevated [AB]   2227 Potassium: 4.0  Stable [AB]   2227 Beta-Hydroxybutyrate(!): 4.30  Elevated, consistent with early DKA [AB]   2227 Will start insulin drip for DKA.  Admission. [AB]   2228 Anion Gap(!): 21 [AB]   2230 UA showing infection, will treat as is possibly could be inciting factor for DKA.  Patient is not positive on SIRS criteria, no concerns for sepsis at this time [AB]   2230 Troponin, High Sensitivity: <6 [AB]   2235 Patient on plan of care, in agreement [AB]   2250 Discussed case with [facility], agreed to admit patient [AB]      ED Course User Index  [AB] Shawanda Alberto DO          Skin Assessment:        Pain Score:  Pain Assessment  Pain Assessment: 0-10  Pain Level: 7  Pain Location: Chest, Abdomen  Pain Descriptors: Aching  Pain Type: Acute pain      SOCIAL HISTORY       Social History     Socioeconomic History    Marital status: Single     Spouse name: None    Number of children: None    Years of education: None    Highest education level: None   Tobacco Use    Smoking status: Former    Smokeless tobacco: Never   Substance and Sexual Activity    Alcohol use: No     Alcohol/week: 0.0 standard drinks of alcohol    Drug use: No    Sexual activity: Yes     Partners: Male     Social Determinants of Health     Food Insecurity: No Food Insecurity (4/28/2024)    Hunger Vital Sign     Worried About Running Out of Food in the Last Year: Never true     Ran Out of Food in the Last

## 2024-05-19 NOTE — H&P
[facility]  Office: [phone]  Crow Wallace DO, Maximiliano Sabillon DO, Albino Cadena DO, Amol Solano DO, Erlin Devries MD, Lor Fontana MD, Tiffanie Campos MD, Sherry Al MD,  Miguelito Theodore MD, Karina Blanco MD, Krupa Sanchez MD,  Kiesha Kaur DO, Paxton Rivas MD, Sriram Portillo MD, Agustín Wallace DO, Kourtney Malik MD,  Jose Guadalupe Moralez DO, Amber Sanchez MD, Diana Ballesteros MD, Ruthie Lacey MD, El Solares MD,  Portillo Cabral MD, Myra Montoya MD, Farzana Blackburn MD, Aron Cabrera MD, Lj Morales MD, Lidia Reid MD, Rock Mahajan DO, Ed Hayden DO, France Chen MD,  Elmer Dunlap MD, Shirley Waterhouse, CNP,  Toya Redman CNP, Andrea Hugo, CNP,  April Espinal, DNP, Donna Lizama, CNP, Amaya Vaca, CNP, Baylee Wilson, CNP, Sugar Bills, CNP, Kaia Finley PASusanC, Meredith Kent PA-C, Mahogany Luna, CNP, Coleen Pike, CNP, Catherine De La Rosa, CNP, Jyotsna Lal, CNP, Kira Guillen, CNP, Yana Viveros, CNS, Mago Mai, CNP, Anastasia Rudolph CNP, Tracy Schwab, CNP         [facility]   IN-PATIENT SERVICE   [facility]    HISTORY AND PHYSICAL EXAMINATION            Date:   5/19/2024  Patient name:  Ruth Martínez  Date of admission:  5/18/2024  9:00 PM  MRN:   [id number]  Account:  [id number]  YOB: 1990  PCP:    Indira Chavarria PA-C  Room:   [id number]  Code Status:    Full Code    Chief Complaint:     Chief Complaint   Patient presents with    Chest Pain    Abdominal Pain     States that she has been having discomfort for the last few days    \" I could not keep anything down\"    History Obtained From:     patient    History of Present Illness:     Ruth Martínez is a 33 y.o.  female with history of alcoholic pancreatitis with prior pseudocyst status post FNA 02/2024 with recurrent pancreatitis, recurrent DKA , prior PE in 2019 complicated by PEA arrest with cognitive impairment.  Patient recently  13 - 60 U/L   Magnesium    Collection Time: 05/18/24  9:50 PM   Result Value Ref Range    Magnesium 2.0 1.6 - 2.6 mg/dL   Beta-Hydroxybutyrate    Collection Time: 05/18/24  9:50 PM   Result Value Ref Range    Beta-Hydroxybutyrate 4.30 (H) 0.02 - 0.27 mmol/L   Urinalysis with Reflex to Culture    Collection Time: 05/18/24  9:51 PM    Specimen: Urine   Result Value Ref Range    Color, UA Yellow Yellow    Turbidity UA Cloudy (A) Clear    Glucose, Ur 3+ (A) NEGATIVE mg/dL    Bilirubin, Urine NEGATIVE NEGATIVE    Ketones, Urine LARGE (A) NEGATIVE mg/dL    Specific Gravity, UA 1.037 (H) 1.005 - 1.030    Urine Hgb NEGATIVE NEGATIVE    pH, Urine 5.5 5.0 - 8.0    Protein, UA NEGATIVE NEGATIVE mg/dL    Urobilinogen, Urine Normal 0.0 - 1.0 EU/dL    Nitrite, Urine NEGATIVE NEGATIVE    Leukocyte Esterase, Urine SMALL (A) NEGATIVE   Microscopic Urinalysis    Collection Time: 05/18/24  9:51 PM   Result Value Ref Range    WBC, UA 20 TO 50 0 - 5 /HPF    RBC, UA 2 TO 5 0 - 2 /HPF    Casts UA 0 TO 2 0 - 2 /LPF    Casts UA HYALINE 0 - 2 /LPF    Epithelial Cells, UA 5 TO 10 0 - 5 /HPF    Bacteria, UA FEW (A) None   POC Glucose Fingerstick    Collection Time: 05/18/24 11:31 PM   Result Value Ref Range    POC Glucose 509 (HH) 65 - 105 mg/dL   POC Glucose Fingerstick    Collection Time: 05/19/24 12:55 AM   Result Value Ref Range    POC Glucose 256 (H) 65 - 105 mg/dL   POC Glucose Fingerstick    Collection Time: 05/19/24  1:42 AM   Result Value Ref Range    POC Glucose 198 (H) 65 - 105 mg/dL   POC Glucose Fingerstick    Collection Time: 05/19/24  2:52 AM   Result Value Ref Range    POC Glucose 209 (H) 65 - 105 mg/dL   Basic Metabolic Panel    Collection Time: 05/19/24  3:48 AM   Result Value Ref Range    Sodium 137 136 - 145 mmol/L    Potassium 3.3 (L) 3.7 - 5.3 mmol/L    Chloride 111 (H) 98 - 107 mmol/L    CO2 15 (L) 20 - 31 mmol/L    Anion Gap 11 9 - 16 mmol/L    Glucose 220 (H) 74 - 99 mg/dL    BUN 7 6 - 20 mg/dL    Creatinine 0.7 0.50 -

## 2024-05-19 NOTE — PLAN OF CARE
Problem: Discharge Planning  Goal: Discharge to home or other facility with appropriate resources  5/19/2024 1943 by Mellisa Galaviz, RN  Outcome: Progressing  5/19/2024 0759 by Bee Goff RN  Outcome: Progressing     Problem: Pain  Goal: Verbalizes/displays adequate comfort level or baseline comfort level  5/19/2024 1943 by Mellisa Galaviz, RN  Outcome: Progressing  5/19/2024 0759 by Bee Goff RN  Outcome: Progressing     Problem: Safety - Adult  Goal: Free from fall injury  5/19/2024 1943 by Mellisa Galaviz RN  Outcome: Progressing  5/19/2024 0759 by Bee Goff RN  Outcome: Progressing

## 2024-05-19 NOTE — PROGRESS NOTES
Patient vital sign lab were reviewed  Gap close once, pending repeat lab, if gap remained closed on repeat lab will consider give Lantus over lab with insulin drip 2 hours then discontinue the drip.  Patient was started on heparin drip due to history of DVT/PE, reviewed CT angio on this admission negative with no PE,  will switch to Xarelto  Writer reviewed patient discharge summary and previous note, patient noncompliant with her anticoagulation, will continue with Xarelto for now and will encourage patient to follow-up with her primary doctor at time of discharge  Discussed with RN

## 2024-05-19 NOTE — PROGRESS NOTES
Cruzr received call from CT at 0640 to bring patient down for CT that was scheduled at 0545. Writer informed CT that it was not possible to bring patient down at that time due to lack of staff and inability to leave floor. Writer assured CT that day shift would bring patient down as soon as possible, to which CT said they would notify house supervisor. Dr. Malik notified of inability to take patient to CT at time of shift change.

## 2024-05-19 NOTE — CARE COORDINATION
Case Management Assessment  Initial Evaluation    Date/Time of Evaluation: 5/19/2024 9:20 AM  Assessment Completed by: Keyonna Sesay RN    If patient is discharged prior to next notation, then this note serves as note for discharge by case management.    Patient Name: Ruth Martínez                   YOB: 1990  Diagnosis: Acute UTI [N39.0]  DKA, type 2, not at goal (HCC) [E11.10]  Diabetic ketoacidosis without coma associated with type 2 diabetes mellitus (HCC) [E11.10]                   Date / Time: 5/18/2024  9:00 PM    Patient Admission Status: Inpatient   Readmission Risk (Low < 19, Mod (19-27), High > 27): Readmission Risk Score: 19.4    Current PCP: Indira Chavarria PA-C  PCP verified by CM? Yes    Chart Reviewed: Yes      History Provided by: Patient  Patient Orientation: Alert and Oriented, Person, Place, Situation    Patient Cognition: Alert    Hospitalization in the last 30 days (Readmission):  No    If yes, Readmission Assessment in CM Navigator will be completed.    Advance Directives:      Code Status: Full Code   Patient's Primary Decision Maker is: Legal Next of Kin    Primary Decision Maker: Soledad Waters - Brother/Sister - [phone]    Discharge Planning:    Patient lives with: Family Members (sister) Type of Home: House  Primary Care Giver: Self  Patient Support Systems include: Family Members   Current Financial resources: Medicare  Current community resources:    Current services prior to admission: Durable Medical Equipment            Current DME: Glucometer            Type of Home Care services:  None    ADLS  Prior functional level: Independent in ADLs/IADLs  Current functional level: Independent in ADLs/IADLs    PT AM-PAC:   /24  OT AM-PAC:   /24    Family can provide assistance at DC: Yes  Would you like Case Management to discuss the discharge plan with any other family members/significant others, and if so, who? No  Plans to Return to Present Housing:  Yes  Other Identified Issues/Barriers to RETURNING to current housing: na  Potential Assistance needed at discharge: N/A            Potential DME:    Patient expects to discharge to: House  Plan for transportation at discharge: Family    Financial    Payor: MEDICARE / Plan: MEDICARE PART A AND B / Product Type: *No Product type* /     Does insurance require precert for SNF: No    Potential assistance Purchasing Medications: No  Meds-to-Beds request:        [id number] - [place], [place] - [address] - P [phone] - F [phone]  [address] [address]  Phone: [phone] Fax: [phone]      Notes:    Factors facilitating achievement of predicted outcomes: Family support, Motivated, Cooperative, Pleasant, Sense of humor, and Good insight into deficits    Barriers to discharge: Medical complications    Additional Case Management Notes: met with patient to discuss transitional planning. Plan is to return home with sister. Patient has glucometer but needs script for test strips. Patient's sister will provide transportation home. Patient agreeable to plan.    The Plan for Transition of Care is related to the following treatment goals of Acute UTI [N39.0]  DKA, type 2, not at goal (HCC) [E11.10]  Diabetic ketoacidosis without coma associated with type 2 diabetes mellitus (HCC) [E11.10]    IF APPLICABLE: The Patient and/or patient representative Ruth and her family were provided with a choice of provider and agrees with the discharge plan. Freedom of choice list with basic dialogue that supports the patient's individualized plan of care/goals and shares the quality data associated with the providers was provided to: Patient   Patient Representative Name:       The Patient and/or Patient Representative Agree with the Discharge Plan? Yes    Keyonna Sesay RN  Case Management Department  Ph: [phone] Fax: na

## 2024-05-19 NOTE — PLAN OF CARE
Problem: Discharge Planning  Goal: Discharge to home or other facility with appropriate resources  5/19/2024 0759 by Bee Goff, RN  Outcome: Progressing  5/19/2024 0207 by Mlelisa Galaviz RN  Outcome: Progressing     Problem: Pain  Goal: Verbalizes/displays adequate comfort level or baseline comfort level  5/19/2024 0759 by Bee Goff, RN  Outcome: Progressing  5/19/2024 0207 by Mellisa Galaviz RN  Outcome: Progressing     Problem: Safety - Adult  Goal: Free from fall injury  5/19/2024 0759 by Bee Goff RN  Outcome: Progressing  5/19/2024 0207 by Mellisa Galaviz RN  Outcome: Progressing

## 2024-05-20 LAB
ANION GAP SERPL CALCULATED.3IONS-SCNC: 12 MMOL/L (ref 9–16)
ANION GAP SERPL CALCULATED.3IONS-SCNC: 13 MMOL/L (ref 9–16)
ANION GAP SERPL CALCULATED.3IONS-SCNC: 9 MMOL/L (ref 9–16)
BUN SERPL-MCNC: 5 MG/DL (ref 6–20)
BUN SERPL-MCNC: 5 MG/DL (ref 6–20)
BUN SERPL-MCNC: 6 MG/DL (ref 6–20)
CALCIUM SERPL-MCNC: 8.4 MG/DL (ref 8.6–10.4)
CALCIUM SERPL-MCNC: 8.6 MG/DL (ref 8.6–10.4)
CALCIUM SERPL-MCNC: 8.9 MG/DL (ref 8.6–10.4)
CHLORIDE SERPL-SCNC: 106 MMOL/L (ref 98–107)
CHLORIDE SERPL-SCNC: 107 MMOL/L (ref 98–107)
CHLORIDE SERPL-SCNC: 109 MMOL/L (ref 98–107)
CO2 SERPL-SCNC: 13 MMOL/L (ref 20–31)
CO2 SERPL-SCNC: 17 MMOL/L (ref 20–31)
CO2 SERPL-SCNC: 20 MMOL/L (ref 20–31)
CREAT SERPL-MCNC: 0.6 MG/DL (ref 0.5–0.9)
CREAT SERPL-MCNC: 0.7 MG/DL (ref 0.5–0.9)
CREAT SERPL-MCNC: 0.8 MG/DL (ref 0.5–0.9)
ERYTHROCYTE [DISTWIDTH] IN BLOOD BY AUTOMATED COUNT: 13.1 % (ref 11.8–14.4)
EST. AVERAGE GLUCOSE BLD GHB EST-MCNC: 298 MG/DL
GFR, ESTIMATED: >90 ML/MIN/1.73M2
GLUCOSE BLD-MCNC: 212 MG/DL (ref 65–105)
GLUCOSE BLD-MCNC: 212 MG/DL (ref 65–105)
GLUCOSE BLD-MCNC: 254 MG/DL (ref 65–105)
GLUCOSE BLD-MCNC: 290 MG/DL (ref 65–105)
GLUCOSE SERPL-MCNC: 264 MG/DL (ref 74–99)
GLUCOSE SERPL-MCNC: 314 MG/DL (ref 74–99)
GLUCOSE SERPL-MCNC: 314 MG/DL (ref 74–99)
HBA1C MFR BLD: 12 % (ref 4–6)
HCT VFR BLD AUTO: 40.3 % (ref 36.3–47.1)
HGB BLD-MCNC: 12.4 G/DL (ref 11.9–15.1)
MAGNESIUM SERPL-MCNC: 2 MG/DL (ref 1.6–2.6)
MAGNESIUM SERPL-MCNC: 2.1 MG/DL (ref 1.6–2.6)
MAGNESIUM SERPL-MCNC: 2.3 MG/DL (ref 1.6–2.6)
MCH RBC QN AUTO: 29.5 PG (ref 25.2–33.5)
MCHC RBC AUTO-ENTMCNC: 30.8 G/DL (ref 28.4–34.8)
MCV RBC AUTO: 96 FL (ref 82.6–102.9)
NRBC BLD-RTO: 0 PER 100 WBC
PHOSPHATE SERPL-MCNC: 1.8 MG/DL (ref 2.5–4.5)
PHOSPHATE SERPL-MCNC: 2 MG/DL (ref 2.5–4.5)
PHOSPHATE SERPL-MCNC: 2.2 MG/DL (ref 2.5–4.5)
PLATELET # BLD AUTO: 196 K/UL (ref 138–453)
PMV BLD AUTO: 11.9 FL (ref 8.1–13.5)
POTASSIUM SERPL-SCNC: 3.6 MMOL/L (ref 3.7–5.3)
POTASSIUM SERPL-SCNC: 4 MMOL/L (ref 3.7–5.3)
POTASSIUM SERPL-SCNC: 4.2 MMOL/L (ref 3.7–5.3)
RBC # BLD AUTO: 4.2 M/UL (ref 3.95–5.11)
SODIUM SERPL-SCNC: 132 MMOL/L (ref 136–145)
SODIUM SERPL-SCNC: 136 MMOL/L (ref 136–145)
SODIUM SERPL-SCNC: 138 MMOL/L (ref 136–145)
WBC OTHER # BLD: 5.7 K/UL (ref 3.5–11.3)

## 2024-05-20 PROCEDURE — 85027 COMPLETE CBC AUTOMATED: CPT

## 2024-05-20 PROCEDURE — 6370000000 HC RX 637 (ALT 250 FOR IP): Performed by: NURSE PRACTITIONER

## 2024-05-20 PROCEDURE — 83735 ASSAY OF MAGNESIUM: CPT

## 2024-05-20 PROCEDURE — 6360000002 HC RX W HCPCS: Performed by: NURSE PRACTITIONER

## 2024-05-20 PROCEDURE — 80048 BASIC METABOLIC PNL TOTAL CA: CPT

## 2024-05-20 PROCEDURE — 99232 SBSQ HOSP IP/OBS MODERATE 35: CPT | Performed by: STUDENT IN AN ORGANIZED HEALTH CARE EDUCATION/TRAINING PROGRAM

## 2024-05-20 PROCEDURE — 2500000003 HC RX 250 WO HCPCS: Performed by: NURSE PRACTITIONER

## 2024-05-20 PROCEDURE — 6370000000 HC RX 637 (ALT 250 FOR IP): Performed by: STUDENT IN AN ORGANIZED HEALTH CARE EDUCATION/TRAINING PROGRAM

## 2024-05-20 PROCEDURE — 84100 ASSAY OF PHOSPHORUS: CPT

## 2024-05-20 PROCEDURE — 82947 ASSAY GLUCOSE BLOOD QUANT: CPT

## 2024-05-20 PROCEDURE — 36415 COLL VENOUS BLD VENIPUNCTURE: CPT

## 2024-05-20 PROCEDURE — G0108 DIAB MANAGE TRN  PER INDIV: HCPCS

## 2024-05-20 PROCEDURE — 2580000003 HC RX 258: Performed by: NURSE PRACTITIONER

## 2024-05-20 PROCEDURE — 2060000000 HC ICU INTERMEDIATE R&B

## 2024-05-20 RX ORDER — DEXTROSE MONOHYDRATE 100 MG/ML
INJECTION, SOLUTION INTRAVENOUS CONTINUOUS PRN
Status: DISCONTINUED | OUTPATIENT
Start: 2024-05-20 | End: 2024-05-21 | Stop reason: HOSPADM

## 2024-05-20 RX ORDER — INSULIN GLARGINE 100 [IU]/ML
15 INJECTION, SOLUTION SUBCUTANEOUS NIGHTLY
Status: DISCONTINUED | OUTPATIENT
Start: 2024-05-20 | End: 2024-05-21 | Stop reason: HOSPADM

## 2024-05-20 RX ORDER — GLUCAGON 1 MG/ML
1 KIT INJECTION PRN
Status: DISCONTINUED | OUTPATIENT
Start: 2024-05-20 | End: 2024-05-21 | Stop reason: HOSPADM

## 2024-05-20 RX ORDER — MORPHINE SULFATE 2 MG/ML
2 INJECTION, SOLUTION INTRAMUSCULAR; INTRAVENOUS ONCE
Status: DISCONTINUED | OUTPATIENT
Start: 2024-05-20 | End: 2024-05-21 | Stop reason: HOSPADM

## 2024-05-20 RX ORDER — PANTOPRAZOLE SODIUM 40 MG/1
40 TABLET, DELAYED RELEASE ORAL
Status: DISCONTINUED | OUTPATIENT
Start: 2024-05-21 | End: 2024-05-21 | Stop reason: HOSPADM

## 2024-05-20 RX ORDER — OXYCODONE HYDROCHLORIDE AND ACETAMINOPHEN 5; 325 MG/1; MG/1
1 TABLET ORAL EVERY 4 HOURS PRN
Status: DISPENSED | OUTPATIENT
Start: 2024-05-20 | End: 2024-05-21

## 2024-05-20 RX ORDER — POTASSIUM CHLORIDE 20 MEQ/1
40 TABLET, EXTENDED RELEASE ORAL ONCE
Status: COMPLETED | OUTPATIENT
Start: 2024-05-20 | End: 2024-05-20

## 2024-05-20 RX ADMIN — POTASSIUM & SODIUM PHOSPHATES POWDER PACK 280-160-250 MG 250 MG: 280-160-250 PACK at 16:03

## 2024-05-20 RX ADMIN — INSULIN LISPRO 8 UNITS: 100 INJECTION, SOLUTION INTRAVENOUS; SUBCUTANEOUS at 16:03

## 2024-05-20 RX ADMIN — Medication 1000 MG: at 23:05

## 2024-05-20 RX ADMIN — INSULIN LISPRO 8 UNITS: 100 INJECTION, SOLUTION INTRAVENOUS; SUBCUTANEOUS at 07:57

## 2024-05-20 RX ADMIN — POTASSIUM CHLORIDE 40 MEQ: 1500 TABLET, EXTENDED RELEASE ORAL at 08:12

## 2024-05-20 RX ADMIN — POLYETHYLENE GLYCOL 3350 17 G: 17 POWDER, FOR SOLUTION ORAL at 11:53

## 2024-05-20 RX ADMIN — INSULIN LISPRO 4 UNITS: 100 INJECTION, SOLUTION INTRAVENOUS; SUBCUTANEOUS at 11:53

## 2024-05-20 RX ADMIN — POTASSIUM & SODIUM PHOSPHATES POWDER PACK 280-160-250 MG 250 MG: 280-160-250 PACK at 20:42

## 2024-05-20 RX ADMIN — OXYCODONE HYDROCHLORIDE AND ACETAMINOPHEN 1 TABLET: 5; 325 TABLET ORAL at 14:40

## 2024-05-20 RX ADMIN — INSULIN GLARGINE 15 UNITS: 100 INJECTION, SOLUTION SUBCUTANEOUS at 20:42

## 2024-05-20 RX ADMIN — RIVAROXABAN 20 MG: 20 TABLET, FILM COATED ORAL at 16:03

## 2024-05-20 ASSESSMENT — PAIN SCALES - GENERAL
PAINLEVEL_OUTOF10: 7
PAINLEVEL_OUTOF10: 4

## 2024-05-20 ASSESSMENT — ENCOUNTER SYMPTOMS
TROUBLE SWALLOWING: 0
ABDOMINAL PAIN: 1
VOMITING: 0
COUGH: 0
CONSTIPATION: 1
NAUSEA: 0
COLOR CHANGE: 0
BLOOD IN STOOL: 0
SORE THROAT: 0
WHEEZING: 0
SHORTNESS OF BREATH: 0
DIARRHEA: 0
BACK PAIN: 0

## 2024-05-20 ASSESSMENT — PAIN SCALES - WONG BAKER: WONGBAKER_NUMERICALRESPONSE: HURTS A LITTLE BIT

## 2024-05-20 ASSESSMENT — PAIN DESCRIPTION - LOCATION: LOCATION: ABDOMEN

## 2024-05-20 NOTE — PLAN OF CARE
Problem: Discharge Planning  Goal: Discharge to home or other facility with appropriate resources  5/20/2024 0926 by Yvette Louis RN  Outcome: Progressing  5/19/2024 1943 by Mellisa Galaviz RN  Outcome: Progressing     Problem: Pain  Goal: Verbalizes/displays adequate comfort level or baseline comfort level  5/20/2024 0926 by Yvette Louis RN  Outcome: Progressing  5/19/2024 1943 by Mellisa Galaviz RN  Outcome: Progressing     Problem: Safety - Adult  Goal: Free from fall injury  5/20/2024 0926 by Yvette Louis RN  Outcome: Progressing  5/19/2024 1943 by Mellisa Galaviz, RN  Outcome: Progressing

## 2024-05-20 NOTE — CARE COORDINATION
05/20/24 1418   Readmission Assessment   Number of Days since last admission? 8-30 days   Previous Disposition Home with Family   Who is being Interviewed Patient   What was the patient's/caregiver's perception as to why they think they needed to return back to the hospital? Did not realize care needs would be so extensive   Did you visit your Primary Care Physician after you left the hospital, before you returned this time? No   Why weren't you able to visit your PCP? Did not have an appointment   Did you see a specialist, such as Cardiac, Pulmonary, Orthopedic Physician, etc. after you left the hospital? No   Who advised the patient to return to the hospital? Self-referral   Does the patient report anything that got in the way of taking their medications? No   In our efforts to provide the best possible care to you and others like you, can you think of anything that we could have done to help you after you left the hospital the first time, so that you might not have needed to return so soon? Arrange for more help when leaving the hospital;Identify patient's health literacy needs;Additional Community resources available for illness support;Education on how to continue taking medications upon discharge

## 2024-05-20 NOTE — PROGRESS NOTES
[facility]  Office: [phone]  Crow Wallace DO, Maximiliano Sabillon, DO, Albino Cadena DO, Amol Solano, DO, Erlin Devries MD, Lor Fontana MD, Tiffanie Campos MD, Sherry Al MD,  Miguelito Theodore MD, Karina Blanco MD, Krupa Sanchez MD,  Kiesha Kaur DO, Paxton Rivas MD, Sriram Portillo MD, Agustín Wallace DO, Kourtney Malik MD,  Jose Guadalupe Moralez DO, Amber Sanchez MD, Diana Ballesteros MD, Ruthie Lacey MD, El Solares MD,  Portillo Cabral MD, Myra Montoya MD, Farzana Blackburn MD, Aron Cabrera MD, Lj Morales MD, Lidia Reid MD, Rock Mahajan DO, Ed Hayden DO, France Chen MD,  Elmer Dunlap MD, Shirley Waterhouse, CNP,  Toya Redman CNP, Andrea Hugo, CNP,  April Espinal, DNP, Donna Lizama, CNP, Amaya Vaca, CNP, Baylee Wilson CNP, Sugar Bills CNP, Kaia Finley PASusanC, Meredith Kent PASusanC, Mahogany Luna, CNP, Coleen Pike, CNP, Catherine De La Rosa, CNP, Jyotsna Lal, CNP, Kira Guillen, CNP, Yana Viveros, CNS, Mago Mai, CNP, Anastasia Rudolph CNP, Tracy Schwab, CNP         [facility]   IN-PATIENT SERVICE   [facility]    Progress Note    5/20/2024    12:10 PM    Name:   Ruth Martínez  MRN:     [id number]     Acct:      [id number]   Room:   0406/0406-01   Day:  2  Admit Date:  5/18/2024  9:00 PM    PCP:   Indira Chavarria PA-C  Code Status:  Full Code    Subjective:     C/C:   Chief Complaint   Patient presents with    Chest Pain    Abdominal Pain     States that she has been having discomfort for the last few days      Interval History Status: improved.   Seen and examined at time of my evaluation patient reports constipation however she denies any other complaint, later on the patient reported to her nurse that she has left lower quadrant abdominal pain, patient known history of chronic pancreatitis-CT abdomen showed chronic pancreatitis with stable finding we will continue with symptomatic management  She went  °C)    Recent Labs     05/19/24  1820 05/19/24 2026 05/20/24  0709 05/20/24  1128   POCGLU 286* 289* 290* 212*       I/O (24Hr):    Intake/Output Summary (Last 24 hours) at 5/20/2024 1210  Last data filed at 5/19/2024 1827  Gross per 24 hour   Intake 4945.75 ml   Output --   Net 4945.75 ml       Labs:  Hematology:  Recent Labs     05/19/24  1147 05/19/24  1439 05/20/24  0601   WBC 6.6 6.8 5.7   RBC 4.33 4.19 4.20   HGB 13.2 12.6 12.4   HCT 43.1 38.0 40.3   MCV 99.5 90.7 96.0   MCH 30.5 30.1 29.5   MCHC 30.6 33.2 30.8   RDW 12.8 13.2 13.1    176 196   MPV 11.4 12.2 11.9   INR 1.1  --   --      Chemistry:  Recent Labs     05/18/24 2150 05/19/24  0348 05/19/24 1829 05/19/24  2250 05/20/24  0601   *   < > 138 137 138   K 4.0   < > 3.6* 3.7 3.6*   CL 96*   < > 108* 109* 109*   CO2 12*   < > 15* 18* 20   GLUCOSE 622*   < > 320* 320* 314*   BUN 8   < > 4* 4* 5*   CREATININE 1.0*   < > 0.7 0.7 0.6   MG 2.0   < > 1.9 1.9 2.1   ANIONGAP 21*   < > 15 10 9   LABGLOM 78   < > >90 >90 >90   CALCIUM 9.2   < > 8.2* 8.1* 8.4*   PHOS  --    < > 2.0* 1.8* 2.2*   TROPHS <6  --   --   --   --     < > = values in this interval not displayed.     Recent Labs     05/18/24  2150 05/18/24  2331 05/19/24  0348 05/19/24  0431 05/19/24  1245 05/19/24  1535 05/19/24  1820 05/19/24 2026 05/20/24  0709 05/20/24  1128   LABA1C  --   --  12.0*  --   --   --   --   --   --   --    AST 14  --   --   --   --   --   --   --   --   --    ALT <5*  --   --   --   --   --   --   --   --   --    ALKPHOS 128*  --   --   --   --   --   --   --   --   --    BILITOT 0.2  --   --   --   --   --   --   --   --   --    BILIDIR <0.2  --   --   --   --   --   --   --   --   --    LIPASE 416*  --   --   --   --   --   --   --   --   --    POCGLU  --    < >  --    < > 252* 124* 286* 289* 290* 212*    < > = values in this interval not displayed.     ABG:  Lab Results   Component Value Date/Time    FIO2 INFORMATION NOT PROVIDED 02/08/2024 11:42 PM    Psychiatric:         Mood and Affect: Mood normal.         Assessment:        Hospital Problems             Last Modified POA    * (Principal) DKA, type 2, not at goal ([facility]) 5/18/2024 Yes       Plan:        DKA-resolving, patient was weaned off insulin drip and she was started in Lantus, insulin sliding scale and diabetic diet will continue monitor monitor blood sugar and blood adjust the dose based on her blood sugar  Abdominal pain during my evaluation patient denies abdominal pain however she reported to her nurse that she had left lower quadrant abdominal pain, CT abdomen showed chronic pancreatitis, will give pain management and will monitor, consider outpatient follow-up with GI for further evaluation and treatment, continue PPI and symptomatic management  Acute cystitis continue IV ceftriaxone  Hypokalemia will replace and monitor  History of PEA arrest with massive PE in 2019 noncompliant with anticoagulation, will resume home dose of Xarelto and patient was advised to follow-up with the primary doctor at time of discharge  DVT prophylaxis on Xarelto  Disposition will consider discharge patient home tomorrow if her blood sugar control and if she remains stable    Aron Cabrera MD  5/20/2024  12:10 PM

## 2024-05-20 NOTE — PROGRESS NOTES
Inpatient Diabetes Education    Referral received on Ruth Martínez for      Lab Results   Component Value Date    LABA1C 12.0 (H) 05/19/2024    LABA1C 11.7 (H) 04/26/2024    LABA1C 11.0 (H) 02/10/2024     Ruth was seen by our department during a hospitalization last month. That progress note has been copied at the bottom of this note for reference.    I did some preliminary work prior to seeing the patient.  I called the endocrinologist office at [phone]. [facility] states that the patient cancelled her 3-19-24 appointment. They states that she \"no showed\" to her 4-8-24 appointment. They states that the patient has no upcoming appointments scheduled.  I placed the endocrinologist phone number on the patient's AVS (after visit summary - discharge instruction sheet)  I spoke with [facility] [address] at [phone]. They states that they do not have a prescription for test strips for the patient.   They states that they did get a prescription ready for metformin but the patient has not picked it up. (Per chart review - I noted a progress note from our hospital pharmacist stating that patient told her that she is no longer taking metformin.) Lastly, [facility] reports in February of 2024, they filled Basaglar 30 unit daily in pen and Novolog 2 units tid in pen.    I next spoke directly with Ruth. She states that she remembers the pharmacist here rounding on her. I shared with Ruth the information I got from [facility]. She endorsed an insulin plan of 30 units basal insulin (Basaglar) nightly (in pen) and mealtime insulin (Novolog) 2 units when she eats (in pen). She states that [facility] marked the pens for her so she can keep them straight (which is basal and which is bolus).  Ruth states that she does not check her blood sugar at home. She states that she is out of test strip - this aligns with the information that I gathered from [facility].    I asked Ruth to show me how  to use an insulin pen - she was able to demonstrate this to me appropriately. I encouraged her to set alarms on her phone to help her remember to do this consistently.     I asked patient to work to avoid missing appointments with endocrinologist. She was agreeable.    Verbally reviewed following information with patient  Survival skills Diagnosis, A1C, Blood glucose targets, hypo and hyperglycemia, importance of home blood glucose monitoring, heathy eating  plate method for CHO control portions, be active as recommended by health care providers, take medications/ insulin as directed    Education Folder provided with following support information was left at bedside:   _x__  Handout - What is diabetes? cornerstones4 care 2019  _x__  Handout - Eating Healthy for Life at every Meal / Plate method and grocery list  from www.DiabetesHealth [place].org  _x__ Handout - How to Check Your Blood Sugar from www.DiabetesHealth [place].org  _x__ Handout - Be safe with Needles teaching sheet - / www.DiabetesItiva.org    _x__ Handout - How to Use an Insulin Pen - handout with QR code - Health wise Current as of Sept 22 2021  _x__ Emergency Insert sheet for your Vehicle - ADA Diabetes Emergencies   _x__ Diabetes ID card - ADA Low and High Blood Sugar and treatments  _x__ [facility] Diabetes Education brochure/ contact card    Patient verbalizes and demonstrates understanding  of survival skills education.     RECOMMENDATIONS FOR OUTPATIENT PLAN:  Diabetes Self-Monitoring Supplies:  _X__ Preferred / formulary blood glucose meter for BGSM at home use    _X__ Strips and lancets for qid  frequency of home BGSM    Diabetes Medications:  Patient states that she has insulin at home and if she keeps up with appointments at her Endocrinologist, I anticipate that they will refill.     Diabetes Education / HCP follow -up:   _x_ Would recommend follow -up education at outpatient diabetes education at [facility]. An

## 2024-05-20 NOTE — PROGRESS NOTES
[facility]    Admission Medication Reconciliation       The patient's list of current home medications has been reviewed.     Source(s) of information: spoke to patient and dispense report     Based on information provided by the above source(s), I have updated the patient's home med list as described below.     Please review the ACTION REQUESTED section of this note below for any discrepancies on current hospital orders.        PROVIDER ACTION REQUESTED  Medications that need to be addressed by a physician/nurse practitioner:    Medication Action Requested     Insulin     Metformin      Xarelto   Patient unsure about insulin dose she's on    Patient states no longer taking metformin since she started the insulin    Noticed patient had only one fill of 15 days supply of xarelto, patient never refilled, unsure about the indication? Confirmed with patient that never refilled         Please feel free to call me with any questions about this encounter. Thank you.        Electronically signed by Nissa Ríos RPH on 5/20/2024 at 10:57 AM

## 2024-05-20 NOTE — DISCHARGE INSTRUCTIONS
Please call your Endocrinologist at [phone] to schedule a post-hospitalization appointment. [facility] states that it is their policy to discharge patients from their practice for \"no showing\" for appointments. Please try to attend appointments on time and as scheduled.     Please continue to take your medication as prescribed, continue to check your blood sugar regularly at home and follow-up with your primary doctor within 1 week of discharge    CT abdomen showsChronic pancreatitis with a stable 5.8 cm uncinate pseudocyst.,  Please follow-up with a GI doctor gastroenterology as an outpatient for further evaluation and treatment

## 2024-05-21 VITALS
HEART RATE: 95 BPM | WEIGHT: 221.56 LBS | SYSTOLIC BLOOD PRESSURE: 109 MMHG | TEMPERATURE: 98.7 F | BODY MASS INDEX: 35.61 KG/M2 | RESPIRATION RATE: 21 BRPM | OXYGEN SATURATION: 97 % | DIASTOLIC BLOOD PRESSURE: 73 MMHG | HEIGHT: 66 IN

## 2024-05-21 LAB
ANION GAP SERPL CALCULATED.3IONS-SCNC: 14 MMOL/L (ref 9–16)
ANION GAP SERPL CALCULATED.3IONS-SCNC: 9 MMOL/L (ref 9–16)
BUN SERPL-MCNC: 6 MG/DL (ref 6–20)
BUN SERPL-MCNC: 7 MG/DL (ref 6–20)
CALCIUM SERPL-MCNC: 8.4 MG/DL (ref 8.6–10.4)
CALCIUM SERPL-MCNC: 8.9 MG/DL (ref 8.6–10.4)
CHLORIDE SERPL-SCNC: 105 MMOL/L (ref 98–107)
CHLORIDE SERPL-SCNC: 105 MMOL/L (ref 98–107)
CO2 SERPL-SCNC: 18 MMOL/L (ref 20–31)
CO2 SERPL-SCNC: 23 MMOL/L (ref 20–31)
CREAT SERPL-MCNC: 0.7 MG/DL (ref 0.5–0.9)
CREAT SERPL-MCNC: 0.8 MG/DL (ref 0.5–0.9)
EKG ATRIAL RATE: 93 BPM
EKG P AXIS: 37 DEGREES
EKG P-R INTERVAL: 170 MS
EKG Q-T INTERVAL: 338 MS
EKG QRS DURATION: 66 MS
EKG QTC CALCULATION (BAZETT): 420 MS
EKG R AXIS: -20 DEGREES
EKG T AXIS: 36 DEGREES
EKG VENTRICULAR RATE: 93 BPM
ERYTHROCYTE [DISTWIDTH] IN BLOOD BY AUTOMATED COUNT: 13.2 % (ref 11.8–14.4)
GFR, ESTIMATED: >90 ML/MIN/1.73M2
GFR, ESTIMATED: >90 ML/MIN/1.73M2
GLUCOSE BLD-MCNC: 248 MG/DL (ref 65–105)
GLUCOSE BLD-MCNC: 276 MG/DL (ref 65–105)
GLUCOSE BLD-MCNC: 287 MG/DL (ref 65–105)
GLUCOSE SERPL-MCNC: 266 MG/DL (ref 74–99)
GLUCOSE SERPL-MCNC: 300 MG/DL (ref 74–99)
HCT VFR BLD AUTO: 45.4 % (ref 36.3–47.1)
HGB BLD-MCNC: 13.3 G/DL (ref 11.9–15.1)
MAGNESIUM SERPL-MCNC: 1.8 MG/DL (ref 1.6–2.6)
MAGNESIUM SERPL-MCNC: 1.9 MG/DL (ref 1.6–2.6)
MCH RBC QN AUTO: 29.6 PG (ref 25.2–33.5)
MCHC RBC AUTO-ENTMCNC: 29.3 G/DL (ref 28.4–34.8)
MCV RBC AUTO: 100.9 FL (ref 82.6–102.9)
NRBC BLD-RTO: 0 PER 100 WBC
PHOSPHATE SERPL-MCNC: 2.2 MG/DL (ref 2.5–4.5)
PHOSPHATE SERPL-MCNC: 2.9 MG/DL (ref 2.5–4.5)
PLATELET # BLD AUTO: 168 K/UL (ref 138–453)
PMV BLD AUTO: 11.9 FL (ref 8.1–13.5)
POTASSIUM SERPL-SCNC: 3.9 MMOL/L (ref 3.7–5.3)
POTASSIUM SERPL-SCNC: 4.4 MMOL/L (ref 3.7–5.3)
RBC # BLD AUTO: 4.5 M/UL (ref 3.95–5.11)
SODIUM SERPL-SCNC: 137 MMOL/L (ref 136–145)
SODIUM SERPL-SCNC: 137 MMOL/L (ref 136–145)
WBC OTHER # BLD: 5.9 K/UL (ref 3.5–11.3)

## 2024-05-21 PROCEDURE — 6360000002 HC RX W HCPCS: Performed by: STUDENT IN AN ORGANIZED HEALTH CARE EDUCATION/TRAINING PROGRAM

## 2024-05-21 PROCEDURE — 6370000000 HC RX 637 (ALT 250 FOR IP): Performed by: STUDENT IN AN ORGANIZED HEALTH CARE EDUCATION/TRAINING PROGRAM

## 2024-05-21 PROCEDURE — 6370000000 HC RX 637 (ALT 250 FOR IP): Performed by: NURSE PRACTITIONER

## 2024-05-21 PROCEDURE — 84100 ASSAY OF PHOSPHORUS: CPT

## 2024-05-21 PROCEDURE — 83735 ASSAY OF MAGNESIUM: CPT

## 2024-05-21 PROCEDURE — 80048 BASIC METABOLIC PNL TOTAL CA: CPT

## 2024-05-21 PROCEDURE — 94761 N-INVAS EAR/PLS OXIMETRY MLT: CPT

## 2024-05-21 PROCEDURE — 36415 COLL VENOUS BLD VENIPUNCTURE: CPT

## 2024-05-21 PROCEDURE — 82947 ASSAY GLUCOSE BLOOD QUANT: CPT

## 2024-05-21 PROCEDURE — 99239 HOSP IP/OBS DSCHRG MGMT >30: CPT | Performed by: STUDENT IN AN ORGANIZED HEALTH CARE EDUCATION/TRAINING PROGRAM

## 2024-05-21 PROCEDURE — 85027 COMPLETE CBC AUTOMATED: CPT

## 2024-05-21 RX ORDER — SENNA AND DOCUSATE SODIUM 50; 8.6 MG/1; MG/1
2 TABLET, FILM COATED ORAL DAILY PRN
Status: DISCONTINUED | OUTPATIENT
Start: 2024-05-21 | End: 2024-05-21 | Stop reason: HOSPADM

## 2024-05-21 RX ORDER — SENNA AND DOCUSATE SODIUM 50; 8.6 MG/1; MG/1
2 TABLET, FILM COATED ORAL DAILY PRN
Qty: 20 TABLET | Refills: 0 | Status: SHIPPED | OUTPATIENT
Start: 2024-05-21 | End: 2024-07-03 | Stop reason: SDUPTHER

## 2024-05-21 RX ORDER — PANTOPRAZOLE SODIUM 40 MG/1
40 TABLET, DELAYED RELEASE ORAL
Qty: 30 TABLET | Refills: 3 | Status: SHIPPED | OUTPATIENT
Start: 2024-05-22

## 2024-05-21 RX ORDER — OXYCODONE HYDROCHLORIDE AND ACETAMINOPHEN 5; 325 MG/1; MG/1
1 TABLET ORAL EVERY 4 HOURS PRN
Qty: 12 TABLET | Refills: 0 | Status: SHIPPED | OUTPATIENT
Start: 2024-05-21 | End: 2024-05-24

## 2024-05-21 RX ORDER — INSULIN GLARGINE 100 [IU]/ML
15 INJECTION, SOLUTION SUBCUTANEOUS NIGHTLY
Qty: 10 ML | Refills: 3 | Status: SHIPPED | OUTPATIENT
Start: 2024-05-21

## 2024-05-21 RX ADMIN — INSULIN LISPRO 8 UNITS: 100 INJECTION, SOLUTION INTRAVENOUS; SUBCUTANEOUS at 07:57

## 2024-05-21 RX ADMIN — INSULIN LISPRO 4 UNITS: 100 INJECTION, SOLUTION INTRAVENOUS; SUBCUTANEOUS at 11:42

## 2024-05-21 RX ADMIN — OXYCODONE HYDROCHLORIDE AND ACETAMINOPHEN 1 TABLET: 5; 325 TABLET ORAL at 12:33

## 2024-05-21 RX ADMIN — RIVAROXABAN 20 MG: 20 TABLET, FILM COATED ORAL at 17:08

## 2024-05-21 RX ADMIN — INSULIN LISPRO 8 UNITS: 100 INJECTION, SOLUTION INTRAVENOUS; SUBCUTANEOUS at 17:08

## 2024-05-21 RX ADMIN — Medication 1000 MG: at 17:08

## 2024-05-21 RX ADMIN — PANTOPRAZOLE SODIUM 40 MG: 40 TABLET, DELAYED RELEASE ORAL at 07:56

## 2024-05-21 RX ADMIN — POTASSIUM & SODIUM PHOSPHATES POWDER PACK 280-160-250 MG 250 MG: 280-160-250 PACK at 07:56

## 2024-05-21 RX ADMIN — POLYETHYLENE GLYCOL 3350 17 G: 17 POWDER, FOR SOLUTION ORAL at 07:57

## 2024-05-21 RX ADMIN — OXYCODONE HYDROCHLORIDE AND ACETAMINOPHEN 1 TABLET: 5; 325 TABLET ORAL at 07:56

## 2024-05-21 ASSESSMENT — PAIN SCALES - WONG BAKER
WONGBAKER_NUMERICALRESPONSE: HURTS A LITTLE BIT
WONGBAKER_NUMERICALRESPONSE: HURTS A LITTLE BIT

## 2024-05-21 ASSESSMENT — PAIN SCALES - GENERAL
PAINLEVEL_OUTOF10: 8
PAINLEVEL_OUTOF10: 7
PAINLEVEL_OUTOF10: 8
PAINLEVEL_OUTOF10: 3

## 2024-05-21 ASSESSMENT — PAIN DESCRIPTION - LOCATION: LOCATION: ABDOMEN

## 2024-05-21 NOTE — PLAN OF CARE
Problem: Discharge Planning  Goal: Discharge to home or other facility with appropriate resources  5/20/2024 2033 by Antony Au RN  Outcome: Progressing  5/20/2024 0926 by Yvette Louis RN  Outcome: Progressing     Problem: Pain  Goal: Verbalizes/displays adequate comfort level or baseline comfort level  5/20/2024 2033 by Antony Au RN  Outcome: Progressing  5/20/2024 0926 by Yvette Louis RN  Outcome: Progressing     Problem: Safety - Adult  Goal: Free from fall injury  5/20/2024 2033 by Antony Au RN  Outcome: Progressing  5/20/2024 0926 by Yvette Louis RN  Outcome: Progressing

## 2024-05-21 NOTE — PLAN OF CARE
Problem: Discharge Planning  Goal: Discharge to home or other facility with appropriate resources  5/21/2024 1701 by Yvette Louis RN  Outcome: Adequate for Discharge  5/21/2024 1701 by Yvette Louis RN  Outcome: Progressing  5/21/2024 0944 by Yvette Louis RN  Outcome: Progressing     Problem: Pain  Goal: Verbalizes/displays adequate comfort level or baseline comfort level  5/21/2024 1701 by Yvette Louis RN  Outcome: Adequate for Discharge  5/21/2024 1701 by Yvette Louis RN  Outcome: Progressing  5/21/2024 0944 by Yvette Louis RN  Outcome: Progressing     Problem: Safety - Adult  Goal: Free from fall injury  5/21/2024 1701 by Yvette Louis RN  Outcome: Adequate for Discharge  5/21/2024 1701 by Yvette Louis RN  Outcome: Progressing  5/21/2024 0944 by Yvette Louis RN  Outcome: Progressing

## 2024-05-21 NOTE — DISCHARGE SUMMARY
[facility]  Office: [phone]  Crow Wallace DO, Maximiliano Sabillon DO, Albino Cadena DO, Amol Solano DO, Erlin Devries MD, Lor Fontana MD, Tiffanie Campos MD, Sherry Al MD,  Miguelito Theodore MD, Karina Blanco MD, Krupa Sanchez MD,  Kiesha Kaur DO, Paxton Rivas MD, Sriram Portillo MD, Agustín Wallace DO, Kourtney Malik MD,  Jose Guadalupe Moralez DO, Amber Sanchez MD, Diana Ballesteros MD, Ruthie Lacey MD, El Solares MD,  Portillo Cabral MD, Myra Montoya MD, Farzana Blackburn MD, Aron Cabrera MD, Lj Morales MD, Lidia Reid MD, Rock Mahajan DO, Ed Hayden DO, France Chen MD,  Elmer Dunlap MD, Shirley Waterhouse, CNP,  Toya Redman CNP, Andrea Hugo, CNP,  April Espinal, DNP, Donna Lizama, CNP, Amaya Vaca, CNP, Baylee Wilson, CNP, Sugar Bills, CNP, Kaia Finley PASusanC, Meredith Kent PASusanC, Mahogany Luna, CNP, Coleen Pike, CNP, Catherine De La Rosa, CNP, Jyotsna Lal, CNP, Kira Guillen, CNP, Yana Viveros, CNS, Mago Mai, CNP, Anastasia Rudolph CNP, Tracy Schwab, CNP         [facility]   IN-PATIENT SERVICE   [facility]    Discharge Summary     Patient ID: Ruth Martínez  :  1990   MRN: [id number]     ACCOUNT:  [id number]   Patient's PCP: Indira Chavarria PA-C  Admit Date: 2024   Discharge Date: 2024   Length of Stay: 3  Code Status:  Full Code  Admitting Physician: Aron Cabrera MD  Discharge Physician: Aron Cabrera MD     Active Discharge Diagnoses:   DKA-resolving    Acute cystitis completed 3 days IV ceftriaxone  Hypokalemia will replace and monitor  History of PEA arrest with massive PE in 2019 noncompliant with anticoagulation,continue home dose of Xarelto and patient was advised to follow-up with the primary doctor at time of discharge  Chronic pancreatitis needs outpatient follow-up with GI      Admission Condition:  fair     Discharged Condition: good    Hospital  medication as prescribed, continue to check your blood sugar regularly at home and follow-up with your primary doctor within 1 week of discharge    Discharge Medications:      Medication List        START taking these medications      insulin glargine 100 UNIT/ML injection vial  Commonly known as: LANTUS  Inject 15 Units into the skin nightly  Replaces: Basaglar KwikPen 100 UNIT/ML injection pen     oxyCODONE-acetaminophen 5-325 MG per tablet  Commonly known as: PERCOCET  Take 1 tablet by mouth every 4 hours as needed for Pain for up to 3 days. Max Daily Amount: 6 tablets     phenylephrine-mineral oil-petrolatum 0.25-14-74.9 % rectal ointment  Commonly known as: PREPARATION H  Place rectally 2 times daily as needed for Hemorrhoids     sennosides-docusate sodium 8.6-50 MG tablet  Commonly known as: SENOKOT-S  Take 2 tablets by mouth daily as needed for Constipation            CHANGE how you take these medications      Xarelto 20 MG Tabs tablet  Generic drug: rivaroxaban  What changed: Another medication with the same name was removed. Continue taking this medication, and follow the directions you see here.            CONTINUE taking these medications      glucose monitoring kit  1 kit by Does not apply route daily     insulin lispro (1 Unit Dial) 100 UNIT/ML Sopn  Commonly known as: HumaLOG KwikPen  Inject 2 Units into the skin 3 times daily (before meals)     Insulin Pen Needle 32G X 4 MM Misc  1 each by Does not apply route daily     Lancets Misc  1 each by Does not apply route daily     metFORMIN 500 MG tablet  Commonly known as: GLUCOPHAGE     ondansetron 4 MG tablet  Commonly known as: ZOFRAN  Take 1 tablet by mouth 3 times daily as needed for Nausea or Vomiting     pantoprazole 40 MG tablet  Commonly known as: PROTONIX  Take 1 tablet by mouth every morning (before breakfast)  Start taking on: May 22, 2024            STOP taking these medications      Basaglar KwikPen 100 UNIT/ML injection pen  Generic drug:  insulin glargine  Replaced by: insulin glargine 100 UNIT/ML injection vial     NovoLOG FlexPen 100 UNIT/ML injection pen  Generic drug: insulin aspart     Zenpep 52981-007188 units Cpep  Generic drug: Pancrelipase (Lip-Prot-Amyl)               Where to Get Your Medications        These medications were sent to [id number] - [place], [place] - [address] - P [phone] - F [phone]  [address] [address]      Phone: [phone]   insulin glargine 100 UNIT/ML injection vial  oxyCODONE-acetaminophen 5-325 MG per tablet  pantoprazole 40 MG tablet  phenylephrine-mineral oil-petrolatum 0.25-14-74.9 % rectal ointment  sennosides-docusate sodium 8.6-50 MG tablet         No discharge procedures on file.    Time Spent on discharge is  32 mins in patient examination, evaluation, counseling as well as medication reconciliation, prescriptions for required medications, discharge plan and follow up.    Electronically signed by   Aron Cabrera MD  5/21/2024  10:40 AM      Thank you Indira Sin, PA-C for the opportunity to be involved in this patient's care.    (0) independent

## 2024-05-21 NOTE — PLAN OF CARE
Problem: Discharge Planning  Goal: Discharge to home or other facility with appropriate resources  5/21/2024 0944 by Yvette Louis RN  Outcome: Progressing  5/20/2024 2033 by Antony Au RN  Outcome: Progressing     Problem: Pain  Goal: Verbalizes/displays adequate comfort level or baseline comfort level  5/21/2024 0944 by Yvette Louis RN  Outcome: Progressing  5/20/2024 2033 by Antony Au RN  Outcome: Progressing     Problem: Safety - Adult  Goal: Free from fall injury  5/21/2024 0944 by Yvette Louis RN  Outcome: Progressing  5/20/2024 2033 by Antony Au RN  Outcome: Progressing

## 2024-05-31 ENCOUNTER — TELEPHONE (OUTPATIENT)
Dept: INTERNAL MEDICINE CLINIC | Age: 34
End: 2024-05-31

## 2024-05-31 NOTE — TELEPHONE ENCOUNTER
----- Message from Dank Will Sharma sent at 5/31/2024 12:41 PM EDT -----  Regarding: ECC Appointment Request  ECC Appointment Request    Patient needs appointment for ECC Appointment Type: New to Provider.    Reason for Appointment Request: Available appointments did not meet patient need    Additional Information: Patient requesting to setup an appointment with one of the providers that are accepting new patients within practice. Patient does not have any preference with whom, apart form she would not be available within the date, 06/03/2024.  --------------------------------------------------------------------------------------------------------------------------    Relationship to Patient: Self     Call Back Information: OK to leave message on [employer]  Preferred Call Back Number: Phone [phone]

## 2024-06-03 ENCOUNTER — HOSPITAL ENCOUNTER (OUTPATIENT)
Dept: MRI IMAGING | Age: 34
Discharge: HOME | End: 2024-06-05
Attending: INTERNAL MEDICINE
Payer: MEDICARE

## 2024-06-03 DIAGNOSIS — K86.0 ALCOHOL-INDUCED CHRONIC PANCREATITIS (HCC): ICD-10-CM

## 2024-06-03 DIAGNOSIS — K86.2 PANCREATIC CYST: ICD-10-CM

## 2024-06-03 DIAGNOSIS — F10.20 ALCOHOLISM (HCC): ICD-10-CM

## 2024-06-03 PROCEDURE — 74183 MRI ABD W/O CNTR FLWD CNTR: CPT

## 2024-06-03 PROCEDURE — 2580000003 HC RX 258: Performed by: INTERNAL MEDICINE

## 2024-06-03 PROCEDURE — A9576 INJ PROHANCE MULTIPACK: HCPCS | Performed by: INTERNAL MEDICINE

## 2024-06-03 PROCEDURE — 6360000004 HC RX CONTRAST MEDICATION: Performed by: INTERNAL MEDICINE

## 2024-06-03 RX ORDER — SODIUM CHLORIDE 0.9 % (FLUSH) 0.9 %
30 SYRINGE (ML) INJECTION PRN
Status: DISCONTINUED | OUTPATIENT
Start: 2024-06-03 | End: 2024-06-06 | Stop reason: HOSPADM

## 2024-06-03 RX ADMIN — SODIUM CHLORIDE, PRESERVATIVE FREE 30 ML: 5 INJECTION INTRAVENOUS at 12:58

## 2024-06-03 RX ADMIN — GADOTERIDOL 20 ML: 279.3 INJECTION, SOLUTION INTRAVENOUS at 12:58

## 2024-06-10 ENCOUNTER — TELEPHONE (OUTPATIENT)
Dept: FAMILY MEDICINE | Facility: CLINIC | Age: 34
End: 2024-06-10
Payer: MEDICARE

## 2024-06-10 NOTE — TELEPHONE ENCOUNTER
Patient Quality Outreach    Patient is due for the following:   Hypertension -  BP check    Next Steps:   Schedule a office visit for   Annual Wellness Visit    Type of outreach:    Sent [employer] message.      Questions for provider review:    None           Alayna Sommer MA

## 2024-07-03 ENCOUNTER — OFFICE VISIT (OUTPATIENT)
Dept: INTERNAL MEDICINE CLINIC | Age: 34
End: 2024-07-03
Payer: MEDICARE

## 2024-07-03 ENCOUNTER — TELEPHONE (OUTPATIENT)
Dept: INTERNAL MEDICINE CLINIC | Age: 34
End: 2024-07-03

## 2024-07-03 VITALS
BODY MASS INDEX: 33.74 KG/M2 | HEIGHT: 67 IN | WEIGHT: 215 LBS | DIASTOLIC BLOOD PRESSURE: 64 MMHG | HEART RATE: 81 BPM | SYSTOLIC BLOOD PRESSURE: 118 MMHG | OXYGEN SATURATION: 98 %

## 2024-07-03 DIAGNOSIS — E11.65 TYPE 2 DIABETES MELLITUS WITH HYPERGLYCEMIA, WITH LONG-TERM CURRENT USE OF INSULIN (HCC): Primary | ICD-10-CM

## 2024-07-03 DIAGNOSIS — R53.83 OTHER FATIGUE: ICD-10-CM

## 2024-07-03 DIAGNOSIS — Z13.220 SCREENING FOR HYPERLIPIDEMIA: ICD-10-CM

## 2024-07-03 DIAGNOSIS — K86.0 ALCOHOL-INDUCED CHRONIC PANCREATITIS (HCC): ICD-10-CM

## 2024-07-03 DIAGNOSIS — Z79.4 TYPE 2 DIABETES MELLITUS WITHOUT COMPLICATION, WITH LONG-TERM CURRENT USE OF INSULIN (HCC): ICD-10-CM

## 2024-07-03 DIAGNOSIS — E11.9 TYPE 2 DIABETES MELLITUS WITHOUT COMPLICATION, WITH LONG-TERM CURRENT USE OF INSULIN (HCC): ICD-10-CM

## 2024-07-03 DIAGNOSIS — Z79.4 TYPE 2 DIABETES MELLITUS WITH HYPERGLYCEMIA, WITH LONG-TERM CURRENT USE OF INSULIN (HCC): Primary | ICD-10-CM

## 2024-07-03 DIAGNOSIS — Z87.898 HISTORY OF SHORT TERM MEMORY LOSS: ICD-10-CM

## 2024-07-03 DIAGNOSIS — K59.00 CONSTIPATION, UNSPECIFIED CONSTIPATION TYPE: ICD-10-CM

## 2024-07-03 DIAGNOSIS — Z13.31 POSITIVE DEPRESSION SCREENING: ICD-10-CM

## 2024-07-03 DIAGNOSIS — Z12.4 CERVICAL CANCER SCREENING: ICD-10-CM

## 2024-07-03 DIAGNOSIS — Z87.898 HISTORY OF SHORT TERM MEMORY LOSS: Primary | ICD-10-CM

## 2024-07-03 DIAGNOSIS — Z76.89 ESTABLISHING CARE WITH NEW DOCTOR, ENCOUNTER FOR: ICD-10-CM

## 2024-07-03 PROBLEM — E11.10 DKA, TYPE 2, NOT AT GOAL (HCC): Status: RESOLVED | Noted: 2023-12-21 | Resolved: 2024-07-03

## 2024-07-03 PROBLEM — K85.11 ACUTE BILIARY PANCREATITIS WITH UNINFECTED NECROSIS: Status: RESOLVED | Noted: 2024-02-09 | Resolved: 2024-07-03

## 2024-07-03 PROBLEM — R73.9 HYPERGLYCEMIA: Status: RESOLVED | Noted: 2024-02-09 | Resolved: 2024-07-03

## 2024-07-03 PROBLEM — R79.89 PSEUDOHYPONATREMIA: Status: RESOLVED | Noted: 2023-12-21 | Resolved: 2024-07-03

## 2024-07-03 PROBLEM — N30.00 ACUTE CYSTITIS: Status: RESOLVED | Noted: 2024-02-09 | Resolved: 2024-07-03

## 2024-07-03 PROBLEM — E87.6 ACUTE HYPOKALEMIA: Status: RESOLVED | Noted: 2024-02-12 | Resolved: 2024-07-03

## 2024-07-03 PROBLEM — N17.9 AKI (ACUTE KIDNEY INJURY) (HCC): Status: RESOLVED | Noted: 2023-12-21 | Resolved: 2024-07-03

## 2024-07-03 PROBLEM — E87.29 HIGH ANION GAP METABOLIC ACIDOSIS: Status: RESOLVED | Noted: 2023-12-21 | Resolved: 2024-07-03

## 2024-07-03 PROBLEM — A41.9 SEPSIS (HCC): Status: RESOLVED | Noted: 2023-12-21 | Resolved: 2024-07-03

## 2024-07-03 PROBLEM — E87.20 LACTIC ACIDOSIS: Status: RESOLVED | Noted: 2023-12-21 | Resolved: 2024-07-03

## 2024-07-03 PROBLEM — D72.825 BANDEMIA: Status: RESOLVED | Noted: 2023-12-21 | Resolved: 2024-07-03

## 2024-07-03 PROCEDURE — 3046F HEMOGLOBIN A1C LEVEL >9.0%: CPT

## 2024-07-03 PROCEDURE — 99204 OFFICE O/P NEW MOD 45 MIN: CPT

## 2024-07-03 PROCEDURE — 3078F DIAST BP <80 MM HG: CPT

## 2024-07-03 PROCEDURE — 3074F SYST BP LT 130 MM HG: CPT

## 2024-07-03 RX ORDER — ONDANSETRON 4 MG/1
4 TABLET, FILM COATED ORAL 3 TIMES DAILY PRN
Qty: 15 TABLET | Refills: 0 | Status: SHIPPED | OUTPATIENT
Start: 2024-07-03

## 2024-07-03 RX ORDER — SENNA AND DOCUSATE SODIUM 50; 8.6 MG/1; MG/1
2 TABLET, FILM COATED ORAL DAILY PRN
Qty: 20 TABLET | Refills: 0 | Status: SHIPPED | OUTPATIENT
Start: 2024-07-03

## 2024-07-03 SDOH — ECONOMIC STABILITY: HOUSING INSECURITY
IN THE LAST 12 MONTHS, WAS THERE A TIME WHEN YOU DID NOT HAVE A STEADY PLACE TO SLEEP OR SLEPT IN A SHELTER (INCLUDING NOW)?: NO

## 2024-07-03 SDOH — ECONOMIC STABILITY: FOOD INSECURITY: WITHIN THE PAST 12 MONTHS, YOU WORRIED THAT YOUR FOOD WOULD RUN OUT BEFORE YOU GOT MONEY TO BUY MORE.: NEVER TRUE

## 2024-07-03 SDOH — ECONOMIC STABILITY: FOOD INSECURITY: WITHIN THE PAST 12 MONTHS, THE FOOD YOU BOUGHT JUST DIDN'T LAST AND YOU DIDN'T HAVE MONEY TO GET MORE.: NEVER TRUE

## 2024-07-03 SDOH — ECONOMIC STABILITY: INCOME INSECURITY: HOW HARD IS IT FOR YOU TO PAY FOR THE VERY BASICS LIKE FOOD, HOUSING, MEDICAL CARE, AND HEATING?: NOT HARD AT ALL

## 2024-07-03 ASSESSMENT — ENCOUNTER SYMPTOMS
SHORTNESS OF BREATH: 0
COUGH: 0
CHOKING: 0
VOMITING: 0
ABDOMINAL PAIN: 0
ABDOMINAL DISTENTION: 0
CONSTIPATION: 1
NAUSEA: 0

## 2024-07-03 ASSESSMENT — PATIENT HEALTH QUESTIONNAIRE - PHQ9
1. LITTLE INTEREST OR PLEASURE IN DOING THINGS: SEVERAL DAYS
SUM OF ALL RESPONSES TO PHQ9 QUESTIONS 1 & 2: 2
10. IF YOU CHECKED OFF ANY PROBLEMS, HOW DIFFICULT HAVE THESE PROBLEMS MADE IT FOR YOU TO DO YOUR WORK, TAKE CARE OF THINGS AT HOME, OR GET ALONG WITH OTHER PEOPLE: NOT DIFFICULT AT ALL
SUM OF ALL RESPONSES TO PHQ QUESTIONS 1-9: 10
4. FEELING TIRED OR HAVING LITTLE ENERGY: SEVERAL DAYS
SUM OF ALL RESPONSES TO PHQ QUESTIONS 1-9: 10
8. MOVING OR SPEAKING SO SLOWLY THAT OTHER PEOPLE COULD HAVE NOTICED. OR THE OPPOSITE, BEING SO FIGETY OR RESTLESS THAT YOU HAVE BEEN MOVING AROUND A LOT MORE THAN USUAL: SEVERAL DAYS
2. FEELING DOWN, DEPRESSED OR HOPELESS: SEVERAL DAYS
9. THOUGHTS THAT YOU WOULD BE BETTER OFF DEAD, OR OF HURTING YOURSELF: NOT AT ALL
SUM OF ALL RESPONSES TO PHQ QUESTIONS 1-9: 10
5. POOR APPETITE OR OVEREATING: NOT AT ALL
6. FEELING BAD ABOUT YOURSELF - OR THAT YOU ARE A FAILURE OR HAVE LET YOURSELF OR YOUR FAMILY DOWN: NOT AT ALL
3. TROUBLE FALLING OR STAYING ASLEEP: NEARLY EVERY DAY
SUM OF ALL RESPONSES TO PHQ QUESTIONS 1-9: 10
7. TROUBLE CONCENTRATING ON THINGS, SUCH AS READING THE NEWSPAPER OR WATCHING TELEVISION: NEARLY EVERY DAY

## 2024-07-03 NOTE — PATIENT INSTRUCTIONS
Please bring medications next time you come to the office.   Get labs done.   Schedule appointment with eye doctor.   Schedule appointment with OB/gyn for cervical cancer screen.

## 2024-07-03 NOTE — TELEPHONE ENCOUNTER
Attempted to contact patient twice, phone was ringing then went to a busy signal. I will mail patient the referral order.

## 2024-07-03 NOTE — PROGRESS NOTES
[facility]  [address] [address]  Dept: [phone]  Dept Fax: [phone]    NEW PATIENT VISIT NOTE  Date of patient's visit: 7/3/2024  Patient's Name:  Ruth Martínez YOB: 1990            Patient Care Team:  Sherry Mccain MD as PCP - General (Internal Medicine)  ______________________________________________________________________    Reason for visit:Establish care   ______________________________________________________________________  Chief Compliant   Establish Care (New Patient) and Memory Loss (Patient has been having short term memory problems for more than 1 year.)      ______________________________________________________________________  History of Presenting Illness:  History was obtained from the patient. Ruth Martínez is a 33 y.o. female is here to establish care.  She moved back from [place] 2023 she lives with her sister here.     Diabetes mellitus type 2-patient states that she is on initial insulin, metformin and is on CGM on metformin.  Unsure when was sick first diagnosed diabetes.  She does check her blood sugar levels 3 times a day.She was recently seen at [facility] for hyperglycemia and was discharged from the ED.  As per medications, she is supposed to be on 15 units of Lantus nightly and Premeal insulin.  But patient states that she has been taking 36 units of Lantus nightly.  As per patient she was in a diabetic coma in the past and required hemodialysis as well in the past. Was admitted at [facility] on 5/18/24 and was dcd on 5/21/24. Multiple DKA admissions this year. Follows up with [facility]- managing diabetes.     Cognitive impairment(memory loss?)-unsure of the diagnosis, patient states it is more short-term, she was diagnosed back in [place].    Alcohol induced chronic pancreatitis-follows up with GI.     Pulm embolism 2019-on Xarelto    H/o PEA arrest secondary to 
Visit Information    Have you changed or started any medications since your last visit including any over-the-counter medicines, vitamins, or herbal medicines? no   Are you having any side effects from any of your medications? -  no  Have you stopped taking any of your medications? Is so, why? -  no    Have you seen any other physician or provider since your last visit? No  Have you had any other diagnostic tests since your last visit? No  Have you been seen in the emergency room and/or had an admission to a hospital since we last saw you? No  Have you had your routine dental cleaning in the past 6 months? no    Have you activated your [employer] account? If not, what are your barriers? Yes     Patient Care Team:  Sherry Mccain MD as PCP - General (Internal Medicine)    Medical History Review  Past Medical, Family, and Social History reviewed and does not contribute to the patient presenting condition    Health Maintenance   Topic Date Due    Varicella vaccine (1 of 2 - 2-dose childhood series) Never done    Diabetic foot exam  Never done    Lipids  Never done    Depression Monitoring  Never done    HIV screen  Never done    Diabetic Alb to Cr ratio (uACR) test  Never done    Diabetic retinal exam  Never done    Hepatitis C screen  Never done    Cervical cancer screen  Never done    Hepatitis B vaccine (2 of 3 - 19+ 3-dose series) 08/23/2022    Annual Wellness Visit (Medicare)  Never done    HPV vaccine (3 - 3-dose series) 01/29/2024    Flu vaccine (1) 08/01/2024    A1C test (Diabetic or Prediabetic)  08/19/2024    GFR test (Diabetes, CKD 3-4, OR last GFR 15-59)  05/21/2025    DTaP/Tdap/Td vaccine (3 - Td or Tdap) 02/28/2030    Pneumococcal 0-64 years Vaccine  Completed    COVID-19 Vaccine  Completed    Hepatitis A vaccine  Aged Out    Hib vaccine  Aged Out    Polio vaccine  Aged Out    Meningococcal (ACWY) vaccine  Aged Out       
Inject 2 Units into the skin 3 times daily (before meals) 3 mL 5    glucose monitoring kit 1 kit by Does not apply route daily 1 kit 0    Lancets MISC 1 each by Does not apply route daily 100 each 5    Insulin Pen Needle 32G X 4 MM MISC 1 each by Does not apply route daily 100 each 3     No current facility-administered medications for this visit.       Social History     Tobacco Use    Smoking status: Former     Current packs/day: 0.00     Average packs/day: 0.2 packs/day for 14.0 years (2.8 ttl pk-yrs)     Types: Cigarettes     Quit date: 10/1/2023     Years since quittin.7    Smokeless tobacco: Never   Vaping Use    Vaping Use: Never used   Substance Use Topics    Alcohol use: No     Alcohol/week: 0.0 standard drinks of alcohol    Drug use: No       Family History   Problem Relation Age of Onset    Rheum Arthritis Mother     High Blood Pressure Father     Clotting Disorder Father     Diabetes Father     Cancer Sister         breast    Heart Disease Brother     Other Brother         blood clots        ______________________________________________________________________  Review of Systems   ______________________________________________________________________  Physical Exam   Vitals:    24 0809   BP: 118/64   Site: Left Upper Arm   Pulse: 81   SpO2: 98%   Weight: 97.5 kg (215 lb)   Height: 1.708 m (5' 7.25\")     BP Readings from Last 3 Encounters:   24 118/64   24 109/73   24 98/66          ______________________________________________________________________  Diagnostic findings:  CBC:  Lab Results   Component Value Date/Time    WBC 5.9 2024 02:26 PM    HGB 13.3 2024 02:26 PM     2024 02:26 PM       BMP:    Lab Results   Component Value Date/Time     2024 11:01 AM    K 4.4 2024 11:01 AM     2024 11:01 AM    CO2 23 2024 11:01 AM    BUN 7 2024 11:01 AM    CREATININE 0.8 2024 11:01 AM    GLUCOSE 266 2024

## 2024-07-03 NOTE — TELEPHONE ENCOUNTER
Patient was just seen in office and thought you were going to refer her to Neurology. Please advise if you want patient to see someone and place referral.

## 2024-07-13 ENCOUNTER — HEALTH MAINTENANCE LETTER (OUTPATIENT)
Age: 34
End: 2024-07-13

## 2024-07-15 ENCOUNTER — TELEPHONE (OUTPATIENT)
Dept: INTERNAL MEDICINE CLINIC | Age: 34
End: 2024-07-15

## 2024-07-15 NOTE — TELEPHONE ENCOUNTER
----- Message from Kd Carrillo sent at 7/15/2024  1:51 PM EDT -----  Regarding: ECC Message to Provider  ECC Message to Provider    Relationship to Patient: Self     Additional Information The patient called because her eye doctor call the patient to send or fax over the referral again to them    FAX number: [phone]  ------------------------------------------------------------------------------- -------------------------------------------    Call Back Information: OK to leave message on voicemail  Preferred Call Back Number: Phone [phone]

## 2024-08-01 ENCOUNTER — TELEPHONE (OUTPATIENT)
Dept: INTERNAL MEDICINE CLINIC | Age: 34
End: 2024-08-01

## 2024-09-16 ENCOUNTER — OFFICE VISIT (OUTPATIENT)
Dept: NEUROLOGY | Age: 34
End: 2024-09-16

## 2024-09-16 VITALS
WEIGHT: 223 LBS | HEART RATE: 91 BPM | SYSTOLIC BLOOD PRESSURE: 120 MMHG | DIASTOLIC BLOOD PRESSURE: 79 MMHG | BODY MASS INDEX: 34.67 KG/M2

## 2024-09-16 DIAGNOSIS — G62.9 SENSORY NEUROPATHY: ICD-10-CM

## 2024-09-16 DIAGNOSIS — E53.8 THIAMINE DEFICIENCY WITH WERNICKE-KORSAKOFF SYNDROME IN ADULT (HCC): ICD-10-CM

## 2024-09-16 DIAGNOSIS — R41.3 MEMORY CHANGES: Primary | ICD-10-CM

## 2024-09-16 DIAGNOSIS — G61.81 CIDP (CHRONIC INFLAMMATORY DEMYELINATING POLYNEUROPATHY) (HCC): ICD-10-CM

## 2024-09-16 DIAGNOSIS — F04 THIAMINE DEFICIENCY WITH WERNICKE-KORSAKOFF SYNDROME IN ADULT (HCC): ICD-10-CM

## 2024-09-16 RX ORDER — THIAMINE MONONITRATE (VIT B1) 100 MG
100 TABLET ORAL DAILY
Qty: 30 TABLET | Refills: 3 | Status: SHIPPED | OUTPATIENT
Start: 2024-09-16

## 2024-09-16 RX ORDER — UREA 10 %
500 LOTION (ML) TOPICAL DAILY
Qty: 30 TABLET | Refills: 3 | Status: SHIPPED | OUTPATIENT
Start: 2024-09-16 | End: 2025-09-16

## 2024-09-16 RX ORDER — LANOLIN ALCOHOL/MO/W.PET/CERES
400 CREAM (GRAM) TOPICAL DAILY
Qty: 30 TABLET | Refills: 3 | Status: SHIPPED | OUTPATIENT
Start: 2024-09-16

## 2024-09-23 ENCOUNTER — HOSPITAL ENCOUNTER (OUTPATIENT)
Dept: MRI IMAGING | Age: 34
Discharge: HOME | End: 2024-09-25
Payer: MEDICARE

## 2024-09-23 DIAGNOSIS — R41.3 MEMORY CHANGES: ICD-10-CM

## 2024-09-23 DIAGNOSIS — F04 THIAMINE DEFICIENCY WITH WERNICKE-KORSAKOFF SYNDROME IN ADULT (HCC): ICD-10-CM

## 2024-09-23 DIAGNOSIS — E53.8 THIAMINE DEFICIENCY WITH WERNICKE-KORSAKOFF SYNDROME IN ADULT (HCC): ICD-10-CM

## 2024-09-23 LAB
BUN BLD-MCNC: NORMAL MG/DL (ref 8–26)
EGFR, POC: >90 ML/MIN/1.73M2
POC CREATININE: 0.8 MG/DL (ref 0.51–1.19)

## 2024-09-23 PROCEDURE — A9576 INJ PROHANCE MULTIPACK: HCPCS

## 2024-09-23 PROCEDURE — 70553 MRI BRAIN STEM W/O & W/DYE: CPT

## 2024-09-23 PROCEDURE — 6360000004 HC RX CONTRAST MEDICATION

## 2024-09-23 PROCEDURE — 82565 ASSAY OF CREATININE: CPT

## 2024-09-23 PROCEDURE — 2580000003 HC RX 258

## 2024-09-23 PROCEDURE — 84520 ASSAY OF UREA NITROGEN: CPT

## 2024-09-23 RX ORDER — SODIUM CHLORIDE 0.9 % (FLUSH) 0.9 %
10 SYRINGE (ML) INJECTION PRN
Status: DISCONTINUED | OUTPATIENT
Start: 2024-09-23 | End: 2024-09-26 | Stop reason: HOSPADM

## 2024-09-23 RX ADMIN — GADOTERIDOL 20 ML: 279.3 INJECTION, SOLUTION INTRAVENOUS at 14:04

## 2024-09-23 RX ADMIN — SODIUM CHLORIDE, PRESERVATIVE FREE 10 ML: 5 INJECTION INTRAVENOUS at 14:04

## 2024-10-01 ENCOUNTER — PATIENT OUTREACH (OUTPATIENT)
Dept: OBGYN | Facility: CLINIC | Age: 34
End: 2024-10-01
Payer: MEDICARE

## 2024-10-01 DIAGNOSIS — R87.810 CERVICAL HIGH RISK HPV (HUMAN PAPILLOMAVIRUS) TEST POSITIVE: Primary | ICD-10-CM

## 2024-11-27 ENCOUNTER — COMMUNITY OUTREACH (OUTPATIENT)
Dept: INTERNAL MEDICINE CLINIC | Age: 34
End: 2024-11-27

## 2024-11-30 ENCOUNTER — HEALTH MAINTENANCE LETTER (OUTPATIENT)
Age: 34
End: 2024-11-30

## 2024-12-19 NOTE — PROGRESS NOTES
Diabetes Self-Management Education & Support    Presents for: Initial Assessment for new diagnosis    Type of Visit: In Person    How would patient like to obtain AVS? velasquez    ASSESSMENT:    Hilaria Is confused about how she got diabetes. She states that her father has diabetes and takes insulin. She has switched to diet pop and cut out juice since her diagnosis. We discussed diabetes pathophysiology, basics of healthy eating for diabetes.       Patient's most recent   Lab Results   Component Value Date    A1C 5.6 10/20/2021    A1C 5.0 03/02/2021    is meeting goal of <7.0    Diabetes knowledge and skills assessment:   Patient is knowledgeable in diabetes management concepts related to: none at this time, new dx    Continue education with the following diabetes management concepts: Healthy Eating, Being Active, Monitoring, Taking Medication, Problem Solving, Reducing Risks and Healthy Coping    Based on learning assessment above, most appropriate setting for further diabetes education would be: Individual setting.    INTERVENTIONS:    Education provided today on:  AADE Self-Care Behaviors:  Diabetes Pathophysiology  Healthy Eating: carbohydrate counting, consistency in amount, composition, and timing of food intake, portion control, plate planning method and label reading  Monitoring: purpose, proper technique, log and interpret results, individual blood glucose targets, frequency of monitoring and proper sharps disposal  Taking Medication: action of prescribed medication and when to take medications    Opportunities for ongoing education and support in diabetes-self management were discussed. Pt verbalized understanding of concepts discussed and recommendations provided today.       Education Materials Provided:  [employer] Healthy Living with Diabetes Book, BG Log Sheet, Carbohydrate Counting and My Plate Planner    PLAN    Start taking Metformin  Start testing BG once/day    Topics to cover at  Spoke with mom who states no fever today, all day she has a wet raspy cough, breathing a little harder after coughing fits. This is day 4 of illness.  Encouraged mom to have Shahnaz seen at urgent care if she is breathing harder and having difficulty catching her breath after coughing.  Discussed virus' in community, and due to her young age would be important to have her assessed.  Mom verbalized understanding and agreement with plan of care     "upcoming visits: Healthy Eating, Being Active, Monitoring, Taking Medication, Problem Solving, Reducing Risks and Healthy Coping  Follow-up: scheduled for December 20th    See Goals Section for co-developed, patient-stated behavior change goals.  AVS provided to patient today.          SUBJECTIVE / OBJECTIVE:  Presents for: Initial Assessment for new diagnosis  Accompanied by: Self  Diabetes education in the past 24mo: No  Focus of Visit: Monitoring,Taking Medication  Diabetes type: Type 2  Date of diagnosis: October 2021  Disease course: Stable  Difficulty affording diabetes medication?: No  Difficulty affording diabetes testing supplies?: No  Cultural Influences/Ethnic Background:  American    Diabetes Symptoms & Complications:  Fatigue: Yes  Neuropathy: Sometimes (since last April, since Covid dx)  Polydipsia: Yes  Polyphagia: Sometimes  Polyuria: No  Visual change: Yes  Slow healing wounds: Yes  Symptom course: Stable  Weight trend: Stable  Complications assessed today?: No    Patient Problem List and Family Medical History reviewed for relevant medical history, current medical status, and diabetes risk factors.    Vitals:  Wt 131.5 kg (290 lb)   BMI 45.42 kg/m    Estimated body mass index is 45.42 kg/m  as calculated from the following:    Height as of 11/16/21: 1.702 m (5' 7\").    Weight as of this encounter: 131.5 kg (290 lb).   Last 3 BP:   BP Readings from Last 3 Encounters:   11/16/21 (!) 120/90   10/01/21 120/86   08/13/21 104/70       History   Smoking Status     Current Every Day Smoker     Packs/day: 0.25     Years: 1.00     Types: Cigarettes     Start date: 11/20/2016   Smokeless Tobacco     Never Used       Labs:  Lab Results   Component Value Date    A1C 5.6 10/20/2021    A1C 5.0 03/02/2021     Lab Results   Component Value Date     11/04/2021     06/11/2021     Lab Results   Component Value Date     10/22/2021     12/17/2020     HDL Cholesterol   Date Value Ref Range " Status   12/17/2020 68 >49 mg/dL Final     Direct Measure HDL   Date Value Ref Range Status   10/22/2021 47 (L) >=50 mg/dL Final   ]  GFR Estimate   Date Value Ref Range Status   10/22/2021 88 >60 mL/min/1.73m2 Final     Comment:     As of July 11, 2021, eGFR is calculated by the CKD-EPI creatinine equation, without race adjustment. eGFR can be influenced by muscle mass, exercise, and diet. The reported eGFR is an estimation only and is only applicable if the renal function is stable.   06/11/2021 85 >60 mL/min/[1.73_m2] Final     Comment:     Non  GFR Calc  Starting 12/18/2018, serum creatinine based estimated GFR (eGFR) will be   calculated using the Chronic Kidney Disease Epidemiology Collaboration   (CKD-EPI) equation.       GFR Estimate If Black   Date Value Ref Range Status   06/11/2021 >90 >60 mL/min/[1.73_m2] Final     Comment:      GFR Calc  Starting 12/18/2018, serum creatinine based estimated GFR (eGFR) will be   calculated using the Chronic Kidney Disease Epidemiology Collaboration   (CKD-EPI) equation.       Lab Results   Component Value Date    CR 0.88 10/22/2021    CR 0.91 06/11/2021     No results found for: MICROALBUMIN    Healthy Eating:  Healthy Eating Assessed Today: Yes  Breakfast: drinking water, not usually hungry in the morning OR protein shake - [employer]  Lunch: Bowl of Noodles OR sandwich - meat(turkey or chicken, alonso and mustard), with chips and pickle, sometimes with cucumbers  Dinner: chicken (usually drum sticks, leg quartesrs, chicken wings, chicken breast), starch (macaroni cheese, potatoes) and vegetables (broccoli, peas, green beans, corn  Snacks: chips (1 bag/day) - hot cheetos or lays, pickles, occasional cookie or dessert, not much of a sweet tooth  Other: doesn't like yogurt and bananas. switched to diet pops  Beverages: Diet soda,Water,Energy drinks (crystal light flavoring.)  Has patient met with a dietitian in the past?: No    Being  Active:  Being Active Assessed Today: Yes  Exercise:: Yes (walking, physical therapy once/week)  Days per week of moderate to strenuous exercise (like a brisk walk): 7  On average, minutes per day of exercise at this level: 10  Exercise Minutes per Week: 70    Monitoring:  Monitoring Assessed Today: Yes  Did patient bring glucose meter to appointment? : Yes    Glucose data:  Not testing yet      Taking Medications:  Diabetes Medication(s)     Biguanides       metFORMIN (GLUCOPHAGE-XR) 500 MG 24 hr tablet    Take 1 tablet (500 mg) by mouth daily (with dinner) for 7 days, THEN 1 tablet (500 mg) 2 times daily (with meals) for 7 days, THEN 2 tablets (1,000 mg) 2 times daily (with meals).          Taking Medication Assessed Today: Yes  Current Treatments: Oral Medication (taken by mouth)    Problem Solving:  Problem Solving Assessed Today: Yes  Is the patient at risk for hypoglycemia?: No  Is the patient at risk for DKA?: No              Reducing Risks:  Reducing Risks Assessed Today: Yes  Diabetes Risks: Sedentary Lifestyle,Ethnicity,Family History  CAD Risks: Diabetes Mellitus,Obesity,Sedentary lifestyle    Healthy Coping:  Healthy Coping Assessed Today: Yes  Patient Activation Measure Survey Score:  MARIAM Score (Last Two) 6/13/2019   MARIAM Raw Score 24   Activation Score 40.9   MARIAM Level 1             Janki Guerrero, HILARY, LD, CDE  Time Spent: 50 minutes  Encounter Type: Individual        Any diabetes medication dose changes were made via the Certified Diabetes Care & Education Protocol in collaboration with the patient's referring provider. A copy of this encounter was shared with the provider.

## 2025-01-06 ENCOUNTER — OFFICE VISIT (OUTPATIENT)
Dept: NEUROLOGY | Age: 35
End: 2025-01-06

## 2025-01-06 VITALS
HEIGHT: 67 IN | DIASTOLIC BLOOD PRESSURE: 76 MMHG | HEART RATE: 75 BPM | SYSTOLIC BLOOD PRESSURE: 109 MMHG | WEIGHT: 218.6 LBS | BODY MASS INDEX: 34.31 KG/M2

## 2025-01-06 DIAGNOSIS — E51.2 WERNICKE'S ENCEPHALOPATHY: Primary | ICD-10-CM

## 2025-01-06 DIAGNOSIS — G62.9 SENSORY NEUROPATHY: ICD-10-CM

## 2025-01-06 RX ORDER — MULTIVITAMIN WITH IRON
500 TABLET ORAL DAILY
Qty: 30 TABLET | Refills: 3 | Status: SHIPPED | OUTPATIENT
Start: 2025-01-06 | End: 2026-01-06

## 2025-01-06 RX ORDER — THIAMINE MONONITRATE (VIT B1) 100 MG
100 TABLET ORAL DAILY
Qty: 30 TABLET | Refills: 3 | Status: SHIPPED | OUTPATIENT
Start: 2025-01-06

## 2025-01-06 RX ORDER — FOLIC ACID 1 MG/1
1 TABLET ORAL DAILY
Qty: 30 TABLET | Refills: 5 | Status: SHIPPED | OUTPATIENT
Start: 2025-01-06

## 2025-01-06 ASSESSMENT — ENCOUNTER SYMPTOMS: RESPIRATORY NEGATIVE: 1

## 2025-01-06 NOTE — PROGRESS NOTES
questioning about about her sensory loss, she said that she still has bilateral upper and lower extremity sensory loss and has no idea about IVIG infusion and does not remember the last time she had her IVIG infusion.     During today's visit, she was not with any visitor and says that most of her stuff had not been handled by her sister and she does not know much of history.     Prior pertinent laboratory work-up:  5/2020:  KAREN 1:160. Vitamin B1 low (45). B12 low/normal (285). Negative/normal double stranded DNA, ANCA, C-reactive, RF, GM1, GD1, Gq1b, vitamin A, B6, Vit E, SSA, SSB undetectable.  6/2020 CSF: 0 WBC, 0 RBC, protein 58 glucose 29.  7/2020: Normal Vitamin B1, B12, folate    8/2020: TS-HDS mildly elevated at 9[address] (N<[id number]). Negative FGFR3, Immunofixation, paraneoplastic panel,GD1a.  12/20: B1 low (66)  1/2021:  B1 and B6 slightly elevated (B1 = 193, B6 = 182). Normal B12  3/21: Hba1c 5.0  6/21: Normal B6, SSa, SSb, serum IF (no monoclonal protein)  1/26/23: sNFL elevated at 19.4 (N 0-1.87).      Prior electrophysiologic work-up:  7/23/20 NCS/EMG showed all absent upper and lower limb SNAPS and all normal upper and lower limb motor responses.   8/3/20 NCS/EMG showed absent right  median, ulnar, and sural sensory studies with radial having attenuated amplitude.   1/13/21: NCS still showed a non length-dependent sensory neuropathy or ganglionopathy, but compared to prior studies performed 8/3/20 and 7/23/20 there has been unequivocal interval improvement in sensory response amplitudes in the lower > upper limbs.   2/1/23: NCS/EMG showed a non length-dependent sensory predominant  neuropathy or ganglionopathy. There was a modest degree of worsening of most lower limb motor and sensory responses, although all would still be considered with normal limits. In the upper limbs incidental note is again made of a right sided ulnar neuropathy at the elbow.      Prior pertinent imaging work-up:  5/20: MRI brain with

## 2025-01-07 ENCOUNTER — HOSPITAL ENCOUNTER (OUTPATIENT)
Age: 35
Discharge: HOME | End: 2025-01-07
Payer: MEDICARE

## 2025-01-07 DIAGNOSIS — E51.2 WERNICKE'S ENCEPHALOPATHY: ICD-10-CM

## 2025-01-07 LAB
ALBUMIN SERPL-MCNC: 4 G/DL (ref 3.5–5.2)
ALBUMIN/GLOB SERPL: 1.3 {RATIO} (ref 1–2.5)
ALP SERPL-CCNC: 100 U/L (ref 35–104)
ALT SERPL-CCNC: 8 U/L (ref 10–35)
ANION GAP SERPL CALCULATED.3IONS-SCNC: 11 MMOL/L (ref 9–16)
AST SERPL-CCNC: 15 U/L (ref 10–35)
BASOPHILS # BLD: 0.03 K/UL (ref 0–0.2)
BASOPHILS NFR BLD: 1 % (ref 0–2)
BILIRUB SERPL-MCNC: 0.5 MG/DL (ref 0–1.2)
BUN SERPL-MCNC: 13 MG/DL (ref 6–20)
CALCIUM SERPL-MCNC: 9.6 MG/DL (ref 8.6–10.4)
CHLORIDE SERPL-SCNC: 101 MMOL/L (ref 98–107)
CO2 SERPL-SCNC: 27 MMOL/L (ref 20–31)
CREAT SERPL-MCNC: 0.8 MG/DL (ref 0.6–0.9)
EOSINOPHIL # BLD: 0.03 K/UL (ref 0–0.44)
EOSINOPHILS RELATIVE PERCENT: 1 % (ref 1–4)
ERYTHROCYTE [DISTWIDTH] IN BLOOD BY AUTOMATED COUNT: 13 % (ref 11.8–14.4)
EST. AVERAGE GLUCOSE BLD GHB EST-MCNC: 212 MG/DL
FOLATE SERPL-MCNC: 13.9 NG/ML (ref 4.8–24.2)
GFR, ESTIMATED: >90 ML/MIN/1.73M2
GLUCOSE SERPL-MCNC: 258 MG/DL (ref 74–99)
HBA1C MFR BLD: 9 % (ref 4–6)
HCT VFR BLD AUTO: 42.9 % (ref 36.3–47.1)
HGB BLD-MCNC: 13.5 G/DL (ref 11.9–15.1)
HIV 1+2 AB+HIV1 P24 AG SERPL QL IA: NONREACTIVE
IMM GRANULOCYTES # BLD AUTO: <0.03 K/UL (ref 0–0.3)
IMM GRANULOCYTES NFR BLD: 0 %
LYMPHOCYTES NFR BLD: 1.94 K/UL (ref 1.1–3.7)
LYMPHOCYTES RELATIVE PERCENT: 50 % (ref 24–43)
MCH RBC QN AUTO: 29.1 PG (ref 25.2–33.5)
MCHC RBC AUTO-ENTMCNC: 31.5 G/DL (ref 28.4–34.8)
MCV RBC AUTO: 92.5 FL (ref 82.6–102.9)
MONOCYTES NFR BLD: 0.23 K/UL (ref 0.1–1.2)
MONOCYTES NFR BLD: 6 % (ref 3–12)
NEUTROPHILS NFR BLD: 42 % (ref 36–65)
NEUTS SEG NFR BLD: 1.58 K/UL (ref 1.5–8.1)
NRBC BLD-RTO: 0 PER 100 WBC
PLATELET # BLD AUTO: 288 K/UL (ref 138–453)
PMV BLD AUTO: 10.7 FL (ref 8.1–13.5)
POTASSIUM SERPL-SCNC: 4.1 MMOL/L (ref 3.7–5.3)
PROT SERPL-MCNC: 7.1 G/DL (ref 6.6–8.7)
RBC # BLD AUTO: 4.64 M/UL (ref 3.95–5.11)
SODIUM SERPL-SCNC: 139 MMOL/L (ref 136–145)
T PALLIDUM AB SER QL IA: NONREACTIVE
TSH SERPL DL<=0.05 MIU/L-ACNC: 1.69 UIU/ML (ref 0.27–4.2)
VIT B12 SERPL-MCNC: 376 PG/ML (ref 232–1245)
WBC OTHER # BLD: 3.8 K/UL (ref 3.5–11.3)

## 2025-01-07 PROCEDURE — 36415 COLL VENOUS BLD VENIPUNCTURE: CPT

## 2025-01-07 PROCEDURE — 85025 COMPLETE CBC W/AUTO DIFF WBC: CPT

## 2025-01-07 PROCEDURE — 83036 HEMOGLOBIN GLYCOSYLATED A1C: CPT

## 2025-01-07 PROCEDURE — 82746 ASSAY OF FOLIC ACID SERUM: CPT

## 2025-01-07 PROCEDURE — 87389 HIV-1 AG W/HIV-1&-2 AB AG IA: CPT

## 2025-01-07 PROCEDURE — 82607 VITAMIN B-12: CPT

## 2025-01-07 PROCEDURE — 80053 COMPREHEN METABOLIC PANEL: CPT

## 2025-01-07 PROCEDURE — 86780 TREPONEMA PALLIDUM: CPT

## 2025-01-07 PROCEDURE — 84207 ASSAY OF VITAMIN B-6: CPT

## 2025-01-07 PROCEDURE — 84443 ASSAY THYROID STIM HORMONE: CPT

## 2025-01-10 LAB — PYRIDOXAL PHOS SERPL-SCNC: 22.1 NMOL/L (ref 20–125)

## 2025-03-09 ENCOUNTER — HEALTH MAINTENANCE LETTER (OUTPATIENT)
Age: 35
End: 2025-03-09

## 2025-03-18 ENCOUNTER — HOSPITAL ENCOUNTER (EMERGENCY)
Age: 35
Discharge: HOME | End: 2025-03-19
Attending: EMERGENCY MEDICINE
Payer: MEDICARE

## 2025-03-18 ENCOUNTER — APPOINTMENT (OUTPATIENT)
Dept: CT IMAGING | Age: 35
End: 2025-03-18
Payer: MEDICARE

## 2025-03-18 ENCOUNTER — APPOINTMENT (OUTPATIENT)
Dept: GENERAL RADIOLOGY | Age: 35
End: 2025-03-18
Payer: MEDICARE

## 2025-03-18 ENCOUNTER — APPOINTMENT (OUTPATIENT)
Dept: MRI IMAGING | Age: 35
End: 2025-03-18
Payer: MEDICARE

## 2025-03-18 DIAGNOSIS — R41.82 ALTERED MENTAL STATUS, UNSPECIFIED ALTERED MENTAL STATUS TYPE: Primary | ICD-10-CM

## 2025-03-18 DIAGNOSIS — R07.9 CHEST PAIN, UNSPECIFIED TYPE: ICD-10-CM

## 2025-03-18 LAB
ANION GAP SERPL CALCULATED.3IONS-SCNC: 18 MMOL/L (ref 9–16)
BASOPHILS # BLD: 0.04 K/UL (ref 0–0.2)
BASOPHILS NFR BLD: 1 % (ref 0–2)
BUN SERPL-MCNC: 11 MG/DL (ref 6–20)
CALCIUM SERPL-MCNC: 9.5 MG/DL (ref 8.6–10.4)
CHLORIDE SERPL-SCNC: 100 MMOL/L (ref 98–107)
CK SERPL-CCNC: 1102 U/L (ref 26–192)
CO2 SERPL-SCNC: 19 MMOL/L (ref 20–31)
CREAT SERPL-MCNC: 0.9 MG/DL (ref 0.6–0.9)
EOSINOPHIL # BLD: 0.03 K/UL (ref 0–0.44)
EOSINOPHILS RELATIVE PERCENT: 1 % (ref 1–4)
ERYTHROCYTE [DISTWIDTH] IN BLOOD BY AUTOMATED COUNT: 12.3 % (ref 11.8–14.4)
GFR, ESTIMATED: 86 ML/MIN/1.73M2
GLUCOSE SERPL-MCNC: 151 MG/DL (ref 74–99)
HCT VFR BLD AUTO: 40.7 % (ref 36.3–47.1)
HGB BLD-MCNC: 13.3 G/DL (ref 11.9–15.1)
IMM GRANULOCYTES # BLD AUTO: <0.03 K/UL (ref 0–0.3)
IMM GRANULOCYTES NFR BLD: 0 %
INR PPP: 1
LYMPHOCYTES NFR BLD: 2.9 K/UL (ref 1.1–3.7)
LYMPHOCYTES RELATIVE PERCENT: 46 % (ref 24–43)
MCH RBC QN AUTO: 29.2 PG (ref 25.2–33.5)
MCHC RBC AUTO-ENTMCNC: 32.7 G/DL (ref 28.4–34.8)
MCV RBC AUTO: 89.3 FL (ref 82.6–102.9)
MONOCYTES NFR BLD: 0.55 K/UL (ref 0.1–1.2)
MONOCYTES NFR BLD: 9 % (ref 3–12)
MYOGLOBIN SERPL-MCNC: 176 NG/ML (ref 25–58)
NEUTROPHILS NFR BLD: 45 % (ref 36–65)
NEUTS SEG NFR BLD: 2.83 K/UL (ref 1.5–8.1)
NRBC BLD-RTO: 0 PER 100 WBC
PARTIAL THROMBOPLASTIN TIME: 21.1 SEC (ref 23–36.5)
PLATELET # BLD AUTO: ABNORMAL K/UL (ref 138–453)
PLATELET, FLUORESCENCE: ABNORMAL K/UL (ref 138–453)
POTASSIUM SERPL-SCNC: 3.7 MMOL/L (ref 3.7–5.3)
PROTHROMBIN TIME: 13.5 SEC (ref 11.7–14.9)
RBC # BLD AUTO: 4.56 M/UL (ref 3.95–5.11)
SODIUM SERPL-SCNC: 137 MMOL/L (ref 136–145)
TROPONIN I SERPL HS-MCNC: 6 NG/L (ref 0–14)
WBC OTHER # BLD: 6.4 K/UL (ref 3.5–11.3)

## 2025-03-18 PROCEDURE — 82803 BLOOD GASES ANY COMBINATION: CPT

## 2025-03-18 PROCEDURE — 85730 THROMBOPLASTIN TIME PARTIAL: CPT

## 2025-03-18 PROCEDURE — 82553 CREATINE MB FRACTION: CPT

## 2025-03-18 PROCEDURE — 82947 ASSAY GLUCOSE BLOOD QUANT: CPT

## 2025-03-18 PROCEDURE — 99285 EMERGENCY DEPT VISIT HI MDM: CPT

## 2025-03-18 PROCEDURE — 85025 COMPLETE CBC W/AUTO DIFF WBC: CPT

## 2025-03-18 PROCEDURE — 85055 RETICULATED PLATELET ASSAY: CPT

## 2025-03-18 PROCEDURE — 82330 ASSAY OF CALCIUM: CPT

## 2025-03-18 PROCEDURE — 80048 BASIC METABOLIC PNL TOTAL CA: CPT

## 2025-03-18 PROCEDURE — 80051 ELECTROLYTE PANEL: CPT

## 2025-03-18 PROCEDURE — 82550 ASSAY OF CK (CPK): CPT

## 2025-03-18 PROCEDURE — 70450 CT HEAD/BRAIN W/O DYE: CPT

## 2025-03-18 PROCEDURE — 84520 ASSAY OF UREA NITROGEN: CPT

## 2025-03-18 PROCEDURE — 6360000004 HC RX CONTRAST MEDICATION

## 2025-03-18 PROCEDURE — 96361 HYDRATE IV INFUSION ADD-ON: CPT

## 2025-03-18 PROCEDURE — 96375 TX/PRO/DX INJ NEW DRUG ADDON: CPT

## 2025-03-18 PROCEDURE — 70551 MRI BRAIN STEM W/O DYE: CPT

## 2025-03-18 PROCEDURE — 96374 THER/PROPH/DIAG INJ IV PUSH: CPT

## 2025-03-18 PROCEDURE — 6360000002 HC RX W HCPCS

## 2025-03-18 PROCEDURE — 83605 ASSAY OF LACTIC ACID: CPT

## 2025-03-18 PROCEDURE — 84484 ASSAY OF TROPONIN QUANT: CPT

## 2025-03-18 PROCEDURE — 85014 HEMATOCRIT: CPT

## 2025-03-18 PROCEDURE — 83874 ASSAY OF MYOGLOBIN: CPT

## 2025-03-18 PROCEDURE — 71275 CT ANGIOGRAPHY CHEST: CPT

## 2025-03-18 PROCEDURE — 85610 PROTHROMBIN TIME: CPT

## 2025-03-18 PROCEDURE — 70498 CT ANGIOGRAPHY NECK: CPT

## 2025-03-18 PROCEDURE — 2580000003 HC RX 258

## 2025-03-18 PROCEDURE — 93005 ELECTROCARDIOGRAM TRACING: CPT

## 2025-03-18 PROCEDURE — 82565 ASSAY OF CREATININE: CPT

## 2025-03-18 PROCEDURE — 71045 X-RAY EXAM CHEST 1 VIEW: CPT

## 2025-03-18 RX ORDER — IOPAMIDOL 755 MG/ML
175 INJECTION, SOLUTION INTRAVASCULAR
Status: COMPLETED | OUTPATIENT
Start: 2025-03-18 | End: 2025-03-18

## 2025-03-18 RX ORDER — KETOROLAC TROMETHAMINE 15 MG/ML
15 INJECTION, SOLUTION INTRAMUSCULAR; INTRAVENOUS ONCE
Status: COMPLETED | OUTPATIENT
Start: 2025-03-18 | End: 2025-03-18

## 2025-03-18 RX ORDER — ONDANSETRON 2 MG/ML
4 INJECTION INTRAMUSCULAR; INTRAVENOUS ONCE
Status: COMPLETED | OUTPATIENT
Start: 2025-03-18 | End: 2025-03-18

## 2025-03-18 RX ORDER — INSULIN ASPART INJECTION 100 [IU]/ML
INJECTION, SOLUTION SUBCUTANEOUS
COMMUNITY
Start: 2025-03-14

## 2025-03-18 RX ORDER — ASPIRIN 300 MG/1
300 SUPPOSITORY RECTAL ONCE
Status: DISCONTINUED | OUTPATIENT
Start: 2025-03-18 | End: 2025-03-19

## 2025-03-18 RX ORDER — LANOLIN ALCOHOL/MO/W.PET/CERES
100 CREAM (GRAM) TOPICAL DAILY
COMMUNITY
Start: 2025-01-26

## 2025-03-18 RX ORDER — 0.9 % SODIUM CHLORIDE 0.9 %
1000 INTRAVENOUS SOLUTION INTRAVENOUS ONCE
Status: COMPLETED | OUTPATIENT
Start: 2025-03-18 | End: 2025-03-19

## 2025-03-18 RX ORDER — ONDANSETRON 2 MG/ML
INJECTION INTRAMUSCULAR; INTRAVENOUS
Status: COMPLETED
Start: 2025-03-18 | End: 2025-03-18

## 2025-03-18 RX ADMIN — ONDANSETRON 4 MG: 2 INJECTION, SOLUTION INTRAMUSCULAR; INTRAVENOUS at 21:59

## 2025-03-18 RX ADMIN — KETOROLAC TROMETHAMINE 15 MG: 15 INJECTION, SOLUTION INTRAMUSCULAR; INTRAVENOUS at 21:33

## 2025-03-18 RX ADMIN — SODIUM CHLORIDE 1000 ML: 9 INJECTION, SOLUTION INTRAVENOUS at 22:08

## 2025-03-18 RX ADMIN — ONDANSETRON 4 MG: 2 INJECTION INTRAMUSCULAR; INTRAVENOUS at 20:50

## 2025-03-18 RX ADMIN — ONDANSETRON 4 MG: 2 INJECTION, SOLUTION INTRAMUSCULAR; INTRAVENOUS at 20:50

## 2025-03-18 RX ADMIN — IOPAMIDOL 175 ML: 755 INJECTION, SOLUTION INTRAVENOUS at 21:12

## 2025-03-18 ASSESSMENT — ENCOUNTER SYMPTOMS
RESPIRATORY NEGATIVE: 1
EYES NEGATIVE: 1
ALLERGIC/IMMUNOLOGIC NEGATIVE: 1
GASTROINTESTINAL NEGATIVE: 1

## 2025-03-18 ASSESSMENT — PAIN SCALES - GENERAL: PAINLEVEL_OUTOF10: 10

## 2025-03-19 VITALS
WEIGHT: 218.7 LBS | SYSTOLIC BLOOD PRESSURE: 102 MMHG | OXYGEN SATURATION: 100 % | HEART RATE: 60 BPM | BODY MASS INDEX: 34 KG/M2 | TEMPERATURE: 99.6 F | DIASTOLIC BLOOD PRESSURE: 66 MMHG | RESPIRATION RATE: 9 BRPM

## 2025-03-19 LAB
BUN BLD-MCNC: 11 MG/DL (ref 8–26)
CA-I BLD-SCNC: 1.14 MMOL/L (ref 1.15–1.33)
CHLORIDE BLD-SCNC: 103 MMOL/L (ref 98–107)
CO2 BLD CALC-SCNC: 22 MMOL/L (ref 22–30)
EGFR, POC: >90 ML/MIN/1.73M2
EKG ATRIAL RATE: 112 BPM
EKG P AXIS: 85 DEGREES
EKG P-R INTERVAL: 146 MS
EKG Q-T INTERVAL: 338 MS
EKG QRS DURATION: 64 MS
EKG QTC CALCULATION (BAZETT): 461 MS
EKG R AXIS: -4 DEGREES
EKG T AXIS: 75 DEGREES
EKG VENTRICULAR RATE: 112 BPM
GLUCOSE BLD-MCNC: 137 MG/DL (ref 74–100)
HCO3 VENOUS: 23.3 MMOL/L (ref 22–29)
HCT VFR BLD AUTO: 50 % (ref 36–46)
NEGATIVE BASE EXCESS, VEN: 0.7 MMOL/L (ref 0–2)
O2 SAT, VEN: 50.7 % (ref 60–85)
PCO2 VENOUS: 36 MM HG (ref 41–51)
PH VENOUS: 7.42 (ref 7.32–7.43)
PO2 VENOUS: 26.5 MM HG (ref 30–50)
POC ANION GAP: 14 MMOL/L (ref 7–16)
POC CREATININE: 0.8 MG/DL (ref 0.51–1.19)
POC HEMOGLOBIN (CALC): 17.1 G/DL (ref 12–16)
POC LACTIC ACID: 3 MMOL/L (ref 0.56–1.39)
POTASSIUM BLD-SCNC: 4.4 MMOL/L (ref 3.5–4.5)
SODIUM BLD-SCNC: 138 MMOL/L (ref 138–146)

## 2025-03-19 PROCEDURE — 2500000003 HC RX 250 WO HCPCS

## 2025-03-19 PROCEDURE — 96375 TX/PRO/DX INJ NEW DRUG ADDON: CPT

## 2025-03-19 PROCEDURE — 6370000000 HC RX 637 (ALT 250 FOR IP)

## 2025-03-19 PROCEDURE — 2580000003 HC RX 258

## 2025-03-19 PROCEDURE — 96361 HYDRATE IV INFUSION ADD-ON: CPT

## 2025-03-19 PROCEDURE — 93010 ELECTROCARDIOGRAM REPORT: CPT | Performed by: INTERNAL MEDICINE

## 2025-03-19 RX ORDER — FAMOTIDINE 20 MG/1
20 TABLET, FILM COATED ORAL 2 TIMES DAILY
Qty: 20 TABLET | Refills: 0 | Status: SHIPPED | OUTPATIENT
Start: 2025-03-19 | End: 2025-03-29

## 2025-03-19 RX ORDER — ASPIRIN 81 MG/1
324 TABLET, CHEWABLE ORAL ONCE
Status: COMPLETED | OUTPATIENT
Start: 2025-03-19 | End: 2025-03-19

## 2025-03-19 RX ORDER — ACETAMINOPHEN 325 MG/1
650 TABLET ORAL ONCE
Status: COMPLETED | OUTPATIENT
Start: 2025-03-19 | End: 2025-03-19

## 2025-03-19 RX ADMIN — FAMOTIDINE 20 MG: 10 INJECTION, SOLUTION INTRAVENOUS at 02:13

## 2025-03-19 RX ADMIN — ASPIRIN 324 MG: 81 TABLET, CHEWABLE ORAL at 01:31

## 2025-03-19 RX ADMIN — ACETAMINOPHEN 650 MG: 325 TABLET ORAL at 01:31

## 2025-03-19 NOTE — ED NOTES
Pt presents to ED via EMS on stretcher d/t altered mental status. Per EMS, pt was ambulatory at home to place of living, but was \"unresponsive\" for EMS. Upon arrival, pt is crying and grimacing. Pt is trashing on stretcher. Pt is alert/responsive to name. When asked if in pain, pt pointed to head and chest. Pt was able to open mouth when RN asked to check temperature. Pt is not speaking in complete sentences at this time. No seizure like activity was witnessed by EMS.  LKW unknown.   EPOC ran, results shown to ED resident.  Pt placed on full cardiac monitor, EKG completed, IV access established. Labs drawn.  Seizure precautions in place.

## 2025-03-19 NOTE — ED PROVIDER NOTES
[facility]  FACULTY HANDOFF     11:13 PM EDT  Handoff taken on the following patient from prior Attending Physician:  Pt Name: Ruth Martínez  PCP:  Sherry Mccain MD    Attestation  I was available and discussed any additional care issues that arose and coordinated the management plans with the resident(s) caring for the patient during my duty period. Any areas of disagreement with resident's documentation of care or procedures are noted on the chart. I was personally present for the key portions of any/all procedures during my duty period. I have documented in the chart those procedures where I was not present during the key portions.         CHIEF COMPLAINT       Chief Complaint   Patient presents with    Altered Mental Status         CURRENT MEDICATIONS     Previous Medications  Previous Medications    FIASP FLEXTOUCH 100 UNIT/ML SOPN        FOLIC ACID (FOLATE) 400 MCG TABLET    Take 1 tablet by mouth daily    FOLIC ACID (FOLVITE) 1 MG TABLET    Take 1 tablet by mouth daily    GLUCOSE MONITORING KIT    1 kit by Does not apply route daily    INSULIN GLARGINE (LANTUS) 100 UNIT/ML INJECTION VIAL    Inject 15 Units into the skin nightly    INSULIN LISPRO, 1 UNIT DIAL, (HUMALOG KWIKPEN) 100 UNIT/ML SOPN    Inject 2 Units into the skin 3 times daily (before meals)    INSULIN PEN NEEDLE 32G X 4 MM MISC    1 each by Does not apply route daily    LANCETS MISC    1 each by Does not apply route daily    METFORMIN (GLUCOPHAGE) 500 MG TABLET    Take 2 tablets by mouth 2 times daily (with meals)    ONDANSETRON (ZOFRAN) 4 MG TABLET    Take 1 tablet by mouth 3 times daily as needed for Nausea or Vomiting    PANTOPRAZOLE (PROTONIX) 40 MG TABLET    Take 1 tablet by mouth every morning (before breakfast)    RIVAROXABAN (XARELTO) 20 MG TABS TABLET    Take 1 tablet by mouth Daily with supper    SENNOSIDES-DOCUSATE SODIUM (SENOKOT-S) 8.6-50 MG TABLET    Take 2 tablets by mouth daily as needed for

## 2025-03-19 NOTE — ED PROVIDER NOTES
[facility] EMERGENCY DEPARTMENT  eMERGENCY dEPARTMENT eNCOUnter   Attending Attestation     Pt Name: Ruth Martínez  MRN: [id number]  Birthdate 1990  Date of evaluation: 3/18/25       Ruth Martínez is a 34 y.o. female who presents with Altered Mental Status      8:47 PM EDT      History: Patient presents with altered mental status.  Patient is moaning, tapping her right hand, patient is hitting her head and her chest saying that her head and chest hurts.  Patient will not speak or open her eyes,    Exam: Heart rate and rhythm are regular.  Lungs are clear.  Abdomen is soft, nontender.  Patient is awake.  Patient understands what I am saying and follows some commands.  Patient moving all extremities.    Given strange presentation with altered mental status happened 20 minutes prior last known well 20 minutes ago, will get stroke alert given inability to talk with concern for severe headache, patient may have intracranial hemorrhage with history of high blood pressure, will follow stroke recommendations, if patient has full recovery consider discharge        I performed a history and physical examination of the patient and discussed management with the resident. I reviewed the resident’s note and agree with the documented findings and plan of care. Any areas of disagreement are noted on the chart. I was personally present for the key portions of any procedures. I have documented in the chart those procedures where I was not present during the key portions. I have personally reviewed all images and agree with the resident's interpretation. I have reviewed the emergency nurses triage note. I agree with the chief complaint, past medical history, past surgical history, allergies, medications, social and family history as documented unless otherwise noted below. Documentation of the HPI, Physical Exam and Medical Decision Making performed by medical students or scribes is based on my personal performance  of the HPI, PE and MDM. For Phys Assistant/ Nurse Practitioner cases/documentation I have had a face to face evaluation of this patient and have completed at least one if not all key elements of the E/M (history, physical exam, and MDM). Additional findings are as noted.    For APC cases I have personally evaluated and examined the patient in conjunction with the APC and agree with the treatment plan and disposition of the patient as recorded by the APC.    Paulie Brown MD  Attending Emergency  Physician       Paulie Brown MD  03/18/25 1812

## 2025-03-19 NOTE — ED NOTES
Pt requesting to go back to address on file. RN request transport of pt back to home via cab. Voucher utilized due to pt having Medicare. Trip # [phone] Per RN pt is ok to cab.

## 2025-03-19 NOTE — ED NOTES
Pt still tearful and crying after CT scan. Pt is now speaking in complete sentences and asking for pain medication. Pt reports was walking to store with her cousins and just ``didn´t feel well.`` Pt verbalized headache and chest pain to writer. Pt was able to state her name, the date, and that she is at the hospital. Pt is now Aox4.

## 2025-03-19 NOTE — DISCHARGE INSTRUCTIONS
You were seen and evaluated for chest pain, concern for seizure, and abdominal discomfort.  Your heart workup was normal.  You were evaluated by our neurology team that take pictures of your brain and had no additional recommendations or concern for seizure.  You have an appointment set up for future follow-up with neurology that we will also show up in your discharge paperwork.  You will be provided with a prescription for Pepcid, and antacid medication.  Please take as prescribed.  Please follow-up with your primary care provider in order to discuss your symptoms, this ER visit, and to answer any other questions or concerns.  Please return to the ER for any sudden chest pain, shortness of breath, or any other questions or concerns.   complains of pain/discomfort

## 2025-03-19 NOTE — ED NOTES
The following labs were labeled with appropriate pt sticker and tubed to lab:     [x] Blue     [] Lavender   [] on ice  [x] Green/yellow  [] Green/black [] on ice  [] Grey  [] on ice  [] Yellow  [] Red  [] Pink  [] Type/ Screen  [] ABG  [] VBG    [] COVID-19 swab    [] Rapid  [] PCR  [] Flu swab  [] Peds Viral Panel     [] Urine Sample  [] Fecal Sample  [] Pelvic Cultures  [] Blood Cultures  [] X 2  [] STREP Cultures  [] Wound Cultures

## 2025-03-19 NOTE — ED NOTES
Writer attempted to administer rectal aspirin. Pt sobbing and continuously stating, ``I am scared!`` Dr. Barlow notified of unable to administer ASA at this time.

## 2025-03-19 NOTE — ED NOTES
Pt is still sobbing and now yelling \"help!\" Writer entered room to pt spitting on bed and c/o nausea.   Dr. Barlow notified.

## 2025-03-19 NOTE — ED PROVIDER NOTES
[facility] EMERGENCY DEPARTMENT  Emergency Department Encounter  Emergency Medicine Resident     Pt Name:Ruth Martínez  MRN: [id number]  Birthdate 1990  Date of evaluation: 3/18/25  PCP:  Sherry Mccain MD  Note Started: 8:41 PM EDT      CHIEF COMPLAINT       Chief Complaint   Patient presents with    Altered Mental Status       HISTORY OF PRESENT ILLNESS  (Location/Symptom, Timing/Onset, Context/Setting, Quality, Duration, Modifying Factors, Severity.)      Ruth Martínez is a 34 y.o. female with PMH of acute bili pancreatitis, MERARY, depression, DM type II, HTN, obesity who presents via EMS with with limited verbal interaction.  Per EMS, patient was walking and got home and patient has been crying/moaning since.  When patient asked what hurts, she responds \"my chest.\"  Difficult to evaluate neurological status, moving all extremities.      PAST MEDICAL / SURGICAL / SOCIAL / FAMILY HISTORY      has a past medical history of Acute biliary pancreatitis with uninfected necrosis, Acute cystitis, MERARY (acute kidney injury), Bandemia, Depression, DKA, type 2, not at goal (HCC), High anion gap metabolic acidosis, Hyperglycemia, Hypertension, Lactic acidosis, Obesity, Postpartum depression, Pseudohyponatremia, and Sepsis (HCC).       has a past surgical history that includes  section; knee surgery (Right, 1389-0901); Esophagogastroduodenoscopy (2024); Upper gastrointestinal endoscopy (N/A, 2024); Upper gastrointestinal endoscopy (2024); and Gastric bypass surgery.      Social History     Socioeconomic History    Marital status: Single     Spouse name: Not on file    Number of children: Not on file    Years of education: Not on file    Highest education level: Not on file   Occupational History    Not on file   Tobacco Use    Smoking status: Former     Current packs/day: 0.00     Average packs/day: 0.2 packs/day for 14.0 years (2.8 ttl pk-yrs)     Types: Cigarettes

## 2025-03-19 NOTE — CONSULTS
Department of Endovascular Neurosurgery                                                                                          Resident Consult Note  Stroke Alert paged @ 8: 38 PM   ER Room # 24  Arrival to patient bedside @ 8: 40 pm        Reason for Consult:  headache, chest pain  Requesting Physician:  Dr. Brown  Endovascular Neurosurgeon:   []Dr. Pereyra  [x]Dr. Lozano  []Dr. Domínguez       History Obtained From:  patient, EMR    CHIEF COMPLAINT:       Chest pain    HISTORY OF PRESENT ILLNESS:       Patient is a poor historian, tearful.  For this reason, history is limited.    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  Patient states her symptoms began sometime this morning, she is unable to tell me last known well.  Per notes, patient was walking and got home and has been crying/moaning since then.  Patient states she is unsure if she is on blood thinners.  Of note, CK elevated at 1000 102, myoglobin elevated at 176.  Troponin 6.  Glucose 151    On exam, NIH is 1 for sensory deficit in bilateral upper and lower extremities.  However this appears to be a baseline issue according to previous neurology notes    CT head without contrast: No acute intracranial abnormality  CTA head and neck: Unremarkable    On presentation:  BP: 108/80  BSL: 151    Prior to arrival patient was on  Antiplatelets/anticoagulants: History of being on Eliquis and rivaroxaban in the past, however patient is unsure if she takes blood thinners currently.    Statins: None       PAST MEDICAL HISTORY :       Past Medical History:        Diagnosis Date    Acute biliary pancreatitis with uninfected necrosis 02/09/2024    Acute  known well.  Per notes, patient was walking and got home and has been crying/moaning since then.  Patient states she is unsure if she is on blood thinners.  Of note, CK elevated at 1000 102, myoglobin elevated at 176.  Troponin 6.  Glucose 151    CT head without contrast: No acute intracranial abnormality  CTA head and neck: Unremarkable    On presentation:  BP: 108/80  BSL: 151    Prior to arrival patient was on  Antiplatelets/anticoagulants: History of being on Eliquis and rivaroxaban in the past, however patient is unsure if she takes blood thinners currently.    Statins: None    Last Known Well (date and time): Unknown    2. Candidate for IV Tenecteplase therapy     Yes []     No  [x] due to the following exclusion criteria: Low NIH, unknown last known well    3. Candidate for Thrombectomy    Yes []      No [x] due to the following exclusion criteria: No LVO    - Discussed with Dr. Belle     Recommendations:    [] General Neurology Care Status - prefer 5th floor (5A/5C)   [x] Internal Medicine General Care Status   [] NICU Status - (5B)     [] MICU Status   [] Observation Status    Imaging   - CT Head  WO : done   - CTA Head and Neck : done   - MRI Brain WO contrast       Medications   -Rectal aspirin 300 mg  Continue home folic acid and thiamine  -Crestor 40 mg    Labs  - Fasting Lipid panel  - HgbA1c lab      - PT, OT, Speech eval   - Telemetry   - Neuro checks per protocol  - We recommend SBP < 140   - Blood glucose goal less than 180  - Please avoid dextrose containing solutions      Additional recommendations may follow    Please contact [facility] with any changes in patients neurologic status.     Thank you for your consult.       Funmilayo Pop MD  PGY 3 Neurology Resident  3/18/2025 at 10:54 PM

## 2025-03-19 NOTE — PROGRESS NOTES
[facility]     Emergency/Trauma Note    PATIENT NAME: Ruth Martínez    Shift date: 3/18/2025  Shift day: Tuesday   Shift # 3    Room # 24/24   Name: Ruth Martínez            Age: 34 y.o.  Gender: female          Church: [employer]   Place of [employer]aeism: Unknown    Trauma/Incident type: Stroke Alert  Admit Date & Time: 3/18/2025  8:35 PM  TRAUMA NAME: N/A    ADVANCE DIRECTIVES IN CHART?  No    NAME OF DECISION MAKER: Unknown    RELATIONSHIP OF DECISION MAKER TO PATIENT: Unknown    PATIENT/EVENT DESCRIPTION:  Ruth Martínez is a 34 y.o. female who arrived to ED24 and was paged out as a \"Stroke Alert.\" Per report, patient arrived \"non-verbal and tearful.\" Patient was taken to CT Scan and returned to room. Pt to be admitted to 24/24.      SPIRITUAL ASSESSMENT-INTERVENTION-OUTCOME:   responded to page and gathered patient information outside room.  introduced herself to patient who initially was non-verbal, however, patient slowly began speaking to . Patient confirmed 's offer to contact family.  reached patient's sister, Soledad, and confirmed patient's arrival to the ED. Per sister, she is currently working but can visit patient when she is done with her shift.  shared words of comfort and support to patient during visit.      PATIENT BELONGINGS:  No belongings noted    ANY BELONGINGS OF SIGNIFICANT VALUE NOTED:  N/A    REGISTRATION STAFF NOTIFIED?  Yes      WHAT IS YOUR SPIRITUAL CARE PLAN FOR THIS PATIENT?:  Chaplains can make follow-up visit, per request. Chaplains can be reached 24/7 via [employer].    Electronically signed by Chaplain Arden, on 3/19/2025 at 8:27 AM.  [facility]  [phone]

## 2025-03-19 NOTE — ED NOTES
Pt placed back on full cardiac monitor.  Pt placed on purewick, brief applied, chucks pad placed underneath pt.

## 2025-04-17 ENCOUNTER — OFFICE VISIT (OUTPATIENT)
Dept: NEUROLOGY | Age: 35
End: 2025-04-17

## 2025-04-17 VITALS
SYSTOLIC BLOOD PRESSURE: 104 MMHG | HEART RATE: 85 BPM | DIASTOLIC BLOOD PRESSURE: 72 MMHG | WEIGHT: 216.6 LBS | BODY MASS INDEX: 34 KG/M2 | HEIGHT: 67 IN

## 2025-04-17 DIAGNOSIS — R20.2 NUMBNESS AND TINGLING OF BOTH UPPER EXTREMITIES: ICD-10-CM

## 2025-04-17 DIAGNOSIS — G61.81 CIDP (CHRONIC INFLAMMATORY DEMYELINATING POLYNEUROPATHY) (HCC): ICD-10-CM

## 2025-04-17 DIAGNOSIS — R41.89 COGNITIVE IMPAIRMENT: Primary | ICD-10-CM

## 2025-04-17 DIAGNOSIS — G62.9 PERIPHERAL POLYNEUROPATHY: ICD-10-CM

## 2025-04-17 DIAGNOSIS — R20.0 NUMBNESS AND TINGLING OF BOTH UPPER EXTREMITIES: ICD-10-CM

## 2025-04-18 ENCOUNTER — PATIENT MESSAGE (OUTPATIENT)
Dept: FAMILY MEDICINE CLINIC | Age: 35
End: 2025-04-18

## 2025-04-18 NOTE — PROGRESS NOTES
Attending Physician Statement  I have discussed the case of Ruth Martínez including pertinent history and exam findings with the resident physician. I reviewed medications, clinical labs, x-rays and other diagnostic tests with the resident physician.    I have seen and examined the patient and the key elements of the encounter have been performed by me. I agree with the assessment, plan and orders as documented by the resident physician.        Ernst Garcia MD 4/18/2025 3:30 PM

## 2025-04-18 NOTE — TELEPHONE ENCOUNTER
Reason for Call:  Other call back    Detailed comments: Pt has just received back positive COVID - 19 test results. She does have an appointment in clinic today with provider to test o2 and is wondering if she should still plan on coming or if it should be cancelled. Please call back to advise. Thank you.    Phone Number Patient can be reached at: Cell number on file:    Telephone Information:   Mobile [phone]       Best Time: Any    Can we leave a detailed message on this number? YES    Call taken on 4/17/2020 at 11:23 AM by Laura Wang         Pt/spouse goal stated...to walk again

## 2025-04-19 ASSESSMENT — ENCOUNTER SYMPTOMS
ABDOMINAL PAIN: 0
WHEEZING: 0

## 2025-04-19 NOTE — PROGRESS NOTES
[address], [facility] [address]  [place] [address]  P: [phone]  F: [phone]    NEUROLOGY CLINIC NOTE     PATIENT NAME: Ruth Martínez  PATIENT MRN: [id number]  PRIMARY CARE PHYSICIAN: Sherry Mccain MD    HPI:      Clinic Visit 4/17/25:    The patient presents for follow up. The patient was seen as a stroke alert 3/18/25, [facility] 1 for sensory deficits, although this is chronic. The patient's chief complaint at that time was chest pain. In the clinic today, the patient does not recall the the ED visit at all. CTA, CT head unremarkable. MRI Brain no acute intracranial abnormality. She states she continues to have significant short term memory loss. She is able to take care of ADLs. When asked about her past medical history, she often states \"I wish I could tell you\". Per chart review she has a significant psychiatric history including auditory hallucinations. She does not recall ever having any hallucinations or seeing a psychiatrist. She reports she stopped drinking many years ago. She lives with her sister. She does not drive. She reports continued numbness and tingling BL upper and lower extremities but denies dysthesia and does not wish to start Gabapentin.     Clinic visit( 1/6/2025):  Patient presented as a follow-up visit for memory issues and sensory neuropathy  Post visit 3 months ago, she is still having short-term memory issues on and off.  She is a very poor historian, unknown why she came to the clinic today but said that she had an appointment and got notification which prompted her to visit us today.  Still admits having decreased sensation over bilateral upper and lower extremities  She underwent MRI of brain 09/23 which was unremarkable  All the labs ordered for memory issues are not done  EMG scheduled outpatient has not been done  Patient admits being active at home and doing all the activities of daily living by herself but

## 2025-05-13 ENCOUNTER — TELEPHONE (OUTPATIENT)
Dept: INTERNAL MEDICINE CLINIC | Age: 35
End: 2025-05-13

## 2025-06-28 ENCOUNTER — HEALTH MAINTENANCE LETTER (OUTPATIENT)
Age: 35
End: 2025-06-28

## (undated) DEVICE — BASIN SET SINGLE STERILE 13752-624

## (undated) DEVICE — LINEN TOWEL PACK X30 5481

## (undated) DEVICE — LINEN TOWEL PACK X6 WHITE 5487

## (undated) DEVICE — ENDOSCOPIC ULTRASOUND ASPIRATION NEEDLE: Brand: EXPECT SLIMLINE SL

## (undated) DEVICE — SYR 30ML LL W/O NDL 302832

## (undated) DEVICE — CLIP APPLIER ENDO 5MM M/L LIGAMAX EL5ML

## (undated) DEVICE — ESU LIGASURE DISSECTOR EXACT LF2019

## (undated) DEVICE — ESU HARMONIC ACE LAP SHEARS ETHICON ACE+ 7 5MMX45CM HARH45

## (undated) DEVICE — Device

## (undated) DEVICE — STPL SKIN 35W ROTATING HEAD PRW35

## (undated) DEVICE — ENDO TROCAR OPTICAL ACCESS KII Z-THRD 15X100MM C0R37

## (undated) DEVICE — NDL INSUFFLATION 13GA 150MM C2202

## (undated) DEVICE — LIGHT HANDLE X1 31140133

## (undated) DEVICE — SINGLE-USE BIOPSY FORCEPS: Brand: RADIAL JAW 4

## (undated) DEVICE — GLOVE PROTEXIS BLUE W/NEU-THERA 8.0  2D73EB80

## (undated) DEVICE — LINEN TOWEL PACK X5 5464

## (undated) DEVICE — PAD CHUX UNDERPAD 23X24" 7136

## (undated) DEVICE — ENDO TROCAR FIRST ENTRY KII FIOS Z-THRD 05X100MM CTF03

## (undated) DEVICE — GLOVE PROTEXIS POWDER FREE SMT 7.5  2D72PT75X

## (undated) DEVICE — PREP CHLORAPREP 26ML TINTED ORANGE  260815

## (undated) DEVICE — SU VICRYL 3-0 SH 27" UND J416H

## (undated) DEVICE — DRSG PRIMAPORE 02X3" 7133

## (undated) DEVICE — DRSG KERLIX 4 1/2"X4YDS ROLL 6730

## (undated) DEVICE — SUCTION MANIFOLD NEPTUNE 2 SYS 4 PORT 0702-020-000

## (undated) DEVICE — SPONGE SURGIFOAM 12 1972

## (undated) DEVICE — ENDO TROCAR SLEEVE KII Z-THREADED 05X100MM CTS02

## (undated) DEVICE — STPL RELOAD REG TISSUE ECHELON 60 X 3.6MM BLUE GST60B

## (undated) DEVICE — GOWN LG DISP 9515

## (undated) DEVICE — SOL WATER IRRIG 1000ML BOTTLE 2F7114

## (undated) DEVICE — ESU PENCIL SMOKE EVAC W/ROCKER SWITCH 0703-047-000

## (undated) DEVICE — ESU GROUND PAD ADULT W/CORD E7507

## (undated) DEVICE — ANTIFOG SOLUTION W/FOAM PAD 31142527

## (undated) DEVICE — PACK RECTAL UMMC

## (undated) DEVICE — STPL POWERED ECHELON LONG 60MM PLEE60A

## (undated) DEVICE — COVER CAMERA IN-LIGHT DISP LT-C02

## (undated) DEVICE — GLOVE PROTEXIS MICRO 7.5  2D73PM75

## (undated) DEVICE — PANTIES MESH LG/XLG 2PK 706M2

## (undated) DEVICE — ENDO POUCH UNIVERSAL RETRIEVAL SYSTEM INZII 12/15MM CD004

## (undated) DEVICE — DRSG GAUZE 4X4" TRAY 6939

## (undated) RX ORDER — DEXAMETHASONE SODIUM PHOSPHATE 4 MG/ML
INJECTION, SOLUTION INTRA-ARTICULAR; INTRALESIONAL; INTRAMUSCULAR; INTRAVENOUS; SOFT TISSUE
Status: DISPENSED
Start: 2019-03-26

## (undated) RX ORDER — SILVER SULFADIAZINE 10 MG/G
CREAM TOPICAL
Status: DISPENSED
Start: 2022-08-03

## (undated) RX ORDER — HYDRALAZINE HYDROCHLORIDE 20 MG/ML
INJECTION INTRAMUSCULAR; INTRAVENOUS
Status: DISPENSED
Start: 2019-03-26

## (undated) RX ORDER — SCOLOPAMINE TRANSDERMAL SYSTEM 1 MG/1
PATCH, EXTENDED RELEASE TRANSDERMAL
Status: DISPENSED
Start: 2019-03-26

## (undated) RX ORDER — OXYCODONE HYDROCHLORIDE 5 MG/1
TABLET ORAL
Status: DISPENSED
Start: 2022-08-03

## (undated) RX ORDER — ONDANSETRON 2 MG/ML
INJECTION INTRAMUSCULAR; INTRAVENOUS
Status: DISPENSED
Start: 2019-03-26

## (undated) RX ORDER — HYDROMORPHONE HYDROCHLORIDE 1 MG/ML
INJECTION, SOLUTION INTRAMUSCULAR; INTRAVENOUS; SUBCUTANEOUS
Status: DISPENSED
Start: 2019-03-26

## (undated) RX ORDER — METRONIDAZOLE 500 MG/100ML
INJECTION, SOLUTION INTRAVENOUS
Status: DISPENSED
Start: 2022-08-03

## (undated) RX ORDER — GABAPENTIN 300 MG/1
CAPSULE ORAL
Status: DISPENSED
Start: 2019-03-26

## (undated) RX ORDER — CEFAZOLIN SODIUM/WATER 3 G/30 ML
SYRINGE (ML) INTRAVENOUS
Status: DISPENSED
Start: 2022-08-03

## (undated) RX ORDER — ACETAMINOPHEN 325 MG/1
TABLET ORAL
Status: DISPENSED
Start: 2022-08-03

## (undated) RX ORDER — SODIUM CHLORIDE, SODIUM LACTATE, POTASSIUM CHLORIDE, CALCIUM CHLORIDE 600; 310; 30; 20 MG/100ML; MG/100ML; MG/100ML; MG/100ML
INJECTION, SOLUTION INTRAVENOUS
Status: DISPENSED
Start: 2019-03-26

## (undated) RX ORDER — FENTANYL CITRATE 50 UG/ML
INJECTION, SOLUTION INTRAMUSCULAR; INTRAVENOUS
Status: DISPENSED
Start: 2022-08-03

## (undated) RX ORDER — FENTANYL CITRATE 50 UG/ML
INJECTION, SOLUTION INTRAMUSCULAR; INTRAVENOUS
Status: DISPENSED
Start: 2019-03-26

## (undated) RX ORDER — METOPROLOL TARTRATE 1 MG/ML
INJECTION, SOLUTION INTRAVENOUS
Status: DISPENSED
Start: 2020-06-01

## (undated) RX ORDER — METOPROLOL TARTRATE 50 MG
TABLET ORAL
Status: DISPENSED
Start: 2020-06-01

## (undated) RX ORDER — LIDOCAINE HYDROCHLORIDE 10 MG/ML
INJECTION, SOLUTION EPIDURAL; INFILTRATION; INTRACAUDAL; PERINEURAL
Status: DISPENSED
Start: 2020-06-02

## (undated) RX ORDER — BUPIVACAINE HYDROCHLORIDE 2.5 MG/ML
INJECTION, SOLUTION EPIDURAL; INFILTRATION; INTRACAUDAL
Status: DISPENSED
Start: 2022-08-03

## (undated) RX ORDER — LIDOCAINE HYDROCHLORIDE 10 MG/ML
INJECTION, SOLUTION EPIDURAL; INFILTRATION; INTRACAUDAL; PERINEURAL
Status: DISPENSED
Start: 2022-08-08

## (undated) RX ORDER — NITROGLYCERIN 0.4 MG/1
TABLET SUBLINGUAL
Status: DISPENSED
Start: 2020-06-01

## (undated) RX ORDER — ACETAMINOPHEN 325 MG/1
TABLET ORAL
Status: DISPENSED
Start: 2019-03-26